# Patient Record
Sex: MALE | Race: WHITE | Employment: OTHER | URBAN - METROPOLITAN AREA
[De-identification: names, ages, dates, MRNs, and addresses within clinical notes are randomized per-mention and may not be internally consistent; named-entity substitution may affect disease eponyms.]

---

## 2017-06-13 PROBLEM — G95.19 INTERMITTENT SPINAL CLAUDICATION (HCC): Status: ACTIVE | Noted: 2017-06-13

## 2017-08-11 PROBLEM — G47.33 OBSTRUCTIVE SLEEP APNEA (ADULT) (PEDIATRIC): Status: ACTIVE | Noted: 2017-08-11

## 2017-11-17 ENCOUNTER — HOSPITAL ENCOUNTER (OUTPATIENT)
Dept: CT IMAGING | Age: 72
Discharge: HOME | End: 2017-11-17
Attending: SURGERY
Payer: MEDICARE

## 2017-11-17 VITALS — BODY MASS INDEX: 38.43 KG/M2 | HEIGHT: 73 IN | WEIGHT: 290 LBS

## 2017-11-17 DIAGNOSIS — I70.213 ATHEROSCLEROSIS OF NATIVE ARTERY OF BOTH LOWER EXTREMITIES WITH INTERMITTENT CLAUDICATION (HCC): ICD-10-CM

## 2017-11-17 LAB — CREAT BLD-MCNC: 1.2 MG/DL (ref 0.8–1.5)

## 2017-11-17 PROCEDURE — 75635 CT ANGIO ABDOMINAL ARTERIES: CPT

## 2017-11-17 PROCEDURE — 82565 ASSAY OF CREATININE: CPT

## 2017-11-17 PROCEDURE — 74011000258 HC RX REV CODE- 258: Performed by: SURGERY

## 2017-11-17 PROCEDURE — 74011636320 HC RX REV CODE- 636/320: Performed by: SURGERY

## 2017-11-17 RX ORDER — SODIUM CHLORIDE 0.9 % (FLUSH) 0.9 %
10 SYRINGE (ML) INJECTION
Status: COMPLETED | OUTPATIENT
Start: 2017-11-17 | End: 2017-11-17

## 2017-11-17 RX ADMIN — IOPAMIDOL 125 ML: 755 INJECTION, SOLUTION INTRAVENOUS at 10:00

## 2017-11-17 RX ADMIN — Medication 10 ML: at 10:00

## 2017-11-17 RX ADMIN — SODIUM CHLORIDE 100 ML: 900 INJECTION, SOLUTION INTRAVENOUS at 10:00

## 2017-12-14 PROBLEM — M25.561 CHRONIC PAIN OF RIGHT KNEE: Status: ACTIVE | Noted: 2017-12-14

## 2017-12-14 PROBLEM — G89.29 CHRONIC PAIN OF RIGHT KNEE: Status: ACTIVE | Noted: 2017-12-14

## 2018-08-20 PROBLEM — F17.211 CIGARETTE NICOTINE DEPENDENCE IN REMISSION: Status: ACTIVE | Noted: 2018-08-20

## 2018-11-27 PROBLEM — R93.1 ABNORMAL NUCLEAR CARDIAC IMAGING TEST: Status: ACTIVE | Noted: 2018-11-27

## 2018-12-03 ENCOUNTER — HOSPITAL ENCOUNTER (OUTPATIENT)
Dept: LAB | Age: 73
Discharge: HOME | End: 2018-12-03
Payer: MEDICARE

## 2018-12-03 DIAGNOSIS — R93.1 ABNORMAL NUCLEAR CARDIAC IMAGING TEST: ICD-10-CM

## 2018-12-03 LAB
ANION GAP SERPL CALC-SCNC: 9 MMOL/L (ref 7–16)
BASOPHILS # BLD: 0 K/UL (ref 0–0.2)
BASOPHILS NFR BLD: 0 % (ref 0–2)
BUN SERPL-MCNC: 16 MG/DL (ref 8–23)
CALCIUM SERPL-MCNC: 9 MG/DL (ref 8.3–10.4)
CHLORIDE SERPL-SCNC: 102 MMOL/L (ref 98–107)
CO2 SERPL-SCNC: 28 MMOL/L (ref 21–32)
CREAT SERPL-MCNC: 1.27 MG/DL (ref 0.8–1.5)
DIFFERENTIAL METHOD BLD: ABNORMAL
EOSINOPHIL # BLD: 0.1 K/UL (ref 0–0.8)
EOSINOPHIL NFR BLD: 1 % (ref 0.5–7.8)
ERYTHROCYTE [DISTWIDTH] IN BLOOD BY AUTOMATED COUNT: 14.2 % (ref 11.9–14.6)
GLUCOSE SERPL-MCNC: 325 MG/DL (ref 65–100)
HCT VFR BLD AUTO: 35.1 % (ref 41.1–50.3)
HGB BLD-MCNC: 11.6 G/DL (ref 13.6–17.2)
IMM GRANULOCYTES # BLD: 0 K/UL (ref 0–0.5)
IMM GRANULOCYTES NFR BLD AUTO: 1 % (ref 0–5)
INR PPP: 1.1
LYMPHOCYTES # BLD: 1.2 K/UL (ref 0.5–4.6)
LYMPHOCYTES NFR BLD: 18 % (ref 13–44)
MCH RBC QN AUTO: 33.1 PG (ref 26.1–32.9)
MCHC RBC AUTO-ENTMCNC: 33 G/DL (ref 31.4–35)
MCV RBC AUTO: 100.3 FL (ref 79.6–97.8)
MONOCYTES # BLD: 0.5 K/UL (ref 0.1–1.3)
MONOCYTES NFR BLD: 7 % (ref 4–12)
NEUTS SEG # BLD: 5 K/UL (ref 1.7–8.2)
NEUTS SEG NFR BLD: 73 % (ref 43–78)
NRBC # BLD: 0 K/UL (ref 0–0.2)
PLATELET # BLD AUTO: 187 K/UL (ref 150–450)
PMV BLD AUTO: 10.6 FL (ref 9.4–12.3)
POTASSIUM SERPL-SCNC: 4.8 MMOL/L (ref 3.5–5.1)
PROTHROMBIN TIME: 13.8 SEC (ref 11.5–14.5)
RBC # BLD AUTO: 3.5 M/UL (ref 4.23–5.6)
SODIUM SERPL-SCNC: 139 MMOL/L (ref 136–145)
WBC # BLD AUTO: 6.8 K/UL (ref 4.3–11.1)

## 2018-12-03 PROCEDURE — 85610 PROTHROMBIN TIME: CPT

## 2018-12-03 PROCEDURE — 85025 COMPLETE CBC W/AUTO DIFF WBC: CPT

## 2018-12-03 PROCEDURE — 36415 COLL VENOUS BLD VENIPUNCTURE: CPT

## 2018-12-03 PROCEDURE — 80048 BASIC METABOLIC PNL TOTAL CA: CPT

## 2018-12-04 RX ORDER — GLUCOSAMINE/CHONDR SU A SOD 750-600 MG
TABLET ORAL 2 TIMES DAILY
COMMUNITY

## 2018-12-04 NOTE — PROGRESS NOTES
Patient pre-assessment complete for [facility] poss with Dr Britt Eugene scheduled for 18 at 12:30pm, arrival time 10:30am. Patient verified using . Patient instructed to bring all home medications in labeled bottles on the day of procedure. NPO status reinforced. Patient informed to take a full dose aspirin 325mg  or 81 mg x 4 on the day of procedure. Patient instructed to HOLD metformin (last dose 12/3/18) & HOLD glipizide, januvia & exforge in am. Instructed they can take all other medications excluding vitamins & supplements. Patient verbalizes understanding of all instructions & denies any questions at this time.

## 2018-12-05 ENCOUNTER — HOSPITAL ENCOUNTER (OUTPATIENT)
Dept: CARDIAC CATH/INVASIVE PROCEDURES | Age: 73
Discharge: HOME | End: 2018-12-05
Attending: INTERNAL MEDICINE | Admitting: INTERNAL MEDICINE
Payer: MEDICARE

## 2018-12-05 VITALS
SYSTOLIC BLOOD PRESSURE: 144 MMHG | OXYGEN SATURATION: 94 % | HEART RATE: 77 BPM | RESPIRATION RATE: 16 BRPM | DIASTOLIC BLOOD PRESSURE: 75 MMHG

## 2018-12-05 PROBLEM — R07.9 CHEST PAIN: Status: ACTIVE | Noted: 2018-12-05

## 2018-12-05 LAB
ATRIAL RATE: 82 BPM
CALCULATED P AXIS, ECG09: -93 DEGREES
CALCULATED R AXIS, ECG10: 78 DEGREES
CALCULATED T AXIS, ECG11: 18 DEGREES
DIAGNOSIS, 93000: NORMAL
GLUCOSE BLD STRIP.AUTO-MCNC: 310 MG/DL (ref 65–100)
P-R INTERVAL, ECG05: 180 MS
Q-T INTERVAL, ECG07: 372 MS
QRS DURATION, ECG06: 98 MS
QTC CALCULATION (BEZET), ECG08: 434 MS
VENTRICULAR RATE, ECG03: 82 BPM

## 2018-12-05 PROCEDURE — 77030004534 HC CATH ANGI DX INFN CARD -A

## 2018-12-05 PROCEDURE — C1725 CATH, TRANSLUMIN NON-LASER: HCPCS

## 2018-12-05 PROCEDURE — 77030015766

## 2018-12-05 PROCEDURE — 74011250636 HC RX REV CODE- 250/636

## 2018-12-05 PROCEDURE — 74011636320 HC RX REV CODE- 636/320: Performed by: INTERNAL MEDICINE

## 2018-12-05 PROCEDURE — 77030019569 HC BND COMPR RAD TERU -B

## 2018-12-05 PROCEDURE — C1887 CATHETER, GUIDING: HCPCS

## 2018-12-05 PROCEDURE — C1769 GUIDE WIRE: HCPCS

## 2018-12-05 PROCEDURE — 93005 ELECTROCARDIOGRAM TRACING: CPT | Performed by: INTERNAL MEDICINE

## 2018-12-05 PROCEDURE — 93458 L HRT ARTERY/VENTRICLE ANGIO: CPT

## 2018-12-05 PROCEDURE — C1874 STENT, COATED/COV W/DEL SYS: HCPCS

## 2018-12-05 PROCEDURE — 74011000250 HC RX REV CODE- 250: Performed by: INTERNAL MEDICINE

## 2018-12-05 PROCEDURE — 74011250637 HC RX REV CODE- 250/637: Performed by: INTERNAL MEDICINE

## 2018-12-05 PROCEDURE — 99153 MOD SED SAME PHYS/QHP EA: CPT

## 2018-12-05 PROCEDURE — 82962 GLUCOSE BLOOD TEST: CPT

## 2018-12-05 PROCEDURE — 99152 MOD SED SAME PHYS/QHP 5/>YRS: CPT

## 2018-12-05 PROCEDURE — 77030012468 HC VLV BLEEDBK CNTRL ABBT -B

## 2018-12-05 PROCEDURE — 74011250636 HC RX REV CODE- 250/636: Performed by: INTERNAL MEDICINE

## 2018-12-05 PROCEDURE — 92928 PRQ TCAT PLMT NTRAC ST 1 LES: CPT

## 2018-12-05 PROCEDURE — C1894 INTRO/SHEATH, NON-LASER: HCPCS

## 2018-12-05 PROCEDURE — 74011000258 HC RX REV CODE- 258: Performed by: INTERNAL MEDICINE

## 2018-12-05 RX ORDER — MIDAZOLAM HYDROCHLORIDE 1 MG/ML
.5-2 INJECTION, SOLUTION INTRAMUSCULAR; INTRAVENOUS
Status: DISCONTINUED | OUTPATIENT
Start: 2018-12-05 | End: 2018-12-05 | Stop reason: HOSPADM

## 2018-12-05 RX ORDER — ROSUVASTATIN CALCIUM 5 MG/1
10 TABLET, COATED ORAL DAILY
Status: DISCONTINUED | OUTPATIENT
Start: 2018-12-06 | End: 2018-12-05 | Stop reason: HOSPADM

## 2018-12-05 RX ORDER — CLOPIDOGREL BISULFATE 75 MG/1
300 TABLET ORAL ONCE
Status: COMPLETED | OUTPATIENT
Start: 2018-12-05 | End: 2018-12-05

## 2018-12-05 RX ORDER — GUAIFENESIN 100 MG/5ML
324 LIQUID (ML) ORAL ONCE
Status: DISCONTINUED | OUTPATIENT
Start: 2018-12-05 | End: 2018-12-05 | Stop reason: HOSPADM

## 2018-12-05 RX ORDER — MAG HYDROX/ALUMINUM HYD/SIMETH 200-200-20
30 SUSPENSION, ORAL (FINAL DOSE FORM) ORAL ONCE
Status: COMPLETED | OUTPATIENT
Start: 2018-12-05 | End: 2018-12-05

## 2018-12-05 RX ORDER — AMLODIPINE, VALSARTAN AND HYDROCHLOROTHIAZIDE 10; 320; 25 MG/1; MG/1; MG/1
1 TABLET ORAL DAILY
Status: DISCONTINUED | OUTPATIENT
Start: 2018-12-06 | End: 2018-12-05 | Stop reason: HOSPADM

## 2018-12-05 RX ORDER — LIDOCAINE HYDROCHLORIDE 10 MG/ML
3-10 INJECTION INFILTRATION; PERINEURAL
Status: DISCONTINUED | OUTPATIENT
Start: 2018-12-05 | End: 2018-12-05 | Stop reason: HOSPADM

## 2018-12-05 RX ORDER — CLOPIDOGREL BISULFATE 75 MG/1
75 TABLET ORAL DAILY
Status: DISCONTINUED | OUTPATIENT
Start: 2018-12-06 | End: 2018-12-05 | Stop reason: HOSPADM

## 2018-12-05 RX ORDER — CLOPIDOGREL BISULFATE 75 MG/1
75 TABLET ORAL ONCE
Status: COMPLETED | OUTPATIENT
Start: 2018-12-05 | End: 2018-12-05

## 2018-12-05 RX ORDER — SODIUM CHLORIDE 9 MG/ML
75 INJECTION, SOLUTION INTRAVENOUS CONTINUOUS
Status: DISCONTINUED | OUTPATIENT
Start: 2018-12-05 | End: 2018-12-05 | Stop reason: HOSPADM

## 2018-12-05 RX ORDER — HEPARIN SODIUM 200 [USP'U]/100ML
2 INJECTION, SOLUTION INTRAVENOUS CONTINUOUS
Status: DISCONTINUED | OUTPATIENT
Start: 2018-12-05 | End: 2018-12-05 | Stop reason: HOSPADM

## 2018-12-05 RX ADMIN — IOPAMIDOL 150 ML: 755 INJECTION, SOLUTION INTRAVENOUS at 13:16

## 2018-12-05 RX ADMIN — BIVALIRUDIN 1.75 MG/KG/HR: 250 INJECTION, POWDER, LYOPHILIZED, FOR SOLUTION INTRAVENOUS at 12:58

## 2018-12-05 RX ADMIN — CLOPIDOGREL BISULFATE 75 MG: 75 TABLET ORAL at 09:51

## 2018-12-05 RX ADMIN — ALUMINUM HYDROXIDE, MAGNESIUM HYDROXIDE, AND SIMETHICONE 30 ML: 200; 200; 20 SUSPENSION ORAL at 13:20

## 2018-12-05 RX ADMIN — LIDOCAINE HYDROCHLORIDE 4 ML: 10 INJECTION, SOLUTION INFILTRATION; PERINEURAL at 12:46

## 2018-12-05 RX ADMIN — HEPARIN SODIUM 2 UNITS/HR: 5000 INJECTION, SOLUTION INTRAVENOUS; SUBCUTANEOUS at 12:31

## 2018-12-05 RX ADMIN — SODIUM CHLORIDE 75 ML/HR: 900 INJECTION, SOLUTION INTRAVENOUS at 09:51

## 2018-12-05 RX ADMIN — CLOPIDOGREL BISULFATE 300 MG: 75 TABLET, FILM COATED ORAL at 13:20

## 2018-12-05 RX ADMIN — HEPARIN SODIUM 2 ML: 10000 INJECTION INTRAVENOUS; SUBCUTANEOUS at 12:47

## 2018-12-05 RX ADMIN — MIDAZOLAM HYDROCHLORIDE 2 MG: 1 INJECTION, SOLUTION INTRAMUSCULAR; INTRAVENOUS at 12:41

## 2018-12-05 NOTE — PROGRESS NOTES
Patient received to [facility] room # 9  Ambulatory from [facility]. Patient scheduled for [facility] today with Dr Koby Chapin. Procedure reviewed & questions answered, voiced good understanding consent obtained & placed on chart. All medications and medical history reviewed. Will prep patient per orders. Patient & family updated on plan of care. The patient has a fraility score of 3-MANAGING WELL, based on ability to perform ADLs by self.

## 2018-12-05 NOTE — PROGRESS NOTES
TRANSFER - OUT REPORT: 
 
[facility] Dr Eladia Dalton RRA Stents x 1 OM (hamzah) Versed 2 mg Angiomax start 1257, stop 1320 Plavix 300 mg 
Mylanta 30 ml 
TR band 12 ml No bleeding/hematoma VSS Pt is a/o no complaints Verbal report given to Sindy(name) on [facility] Board  being transferred to CPRU(unit) for routine progression of care Report consisted of patients Situation, Background, Assessment and  
Recommendations(SBAR). Information from the following report(s) SBAR and Procedure Summary was reviewed with the receiving nurse. Lines:  
Peripheral IV 12/05/18 Anterior;Right Antecubital (Active) Peripheral IV 12/05/18 Anterior; Left Antecubital (Active) Opportunity for questions and clarification was provided.

## 2018-12-05 NOTE — DISCHARGE SUMMARY
[facility] Cardiology Discharge Summary     Patient ID:  Mike Vargas  [id number]  75 y.o.  1945    Admit date: 12/5/2018    Discharge date and time:  12-5-2018    Admitting Physician: Belia Fields MD     Discharge Physician: Jerry Hernández PA-C/Dr. Derian Pemberton    Admission Diagnoses: CAD (coronary artery disease) [I25.10]  Abnormal nuclear stress test [R94.39]    Discharge Diagnoses:   Patient Active Problem List    Diagnosis Date Noted    Chest pain 12/05/2018    Abnormal nuclear cardiac imaging test 11/27/2018    Cigarette nicotine dependence in remission 08/20/2018    Chronic pain of right knee 12/14/2017    Obstructive sleep apnea 08/11/2017    Intermittent spinal claudication ([address] 06/13/2017    Pulmonary emphysema ([address] 11/17/2016    Asthma; SEASONAL 11/10/2015    Encounter for long-term (current) drug use 11/10/2015    Vertiginous syndromes & LABYRINTHINE DISORDERS/UNSPEC 11/10/2015    Orthostatic hypotension 11/10/2015    Allergic rhinitis, CAUSE UNSPECIFIED 98/20/7134    Complicated wound infection 11/10/2015    Osteoarthritis 11/10/2015    Hiatal hernia 11/10/2015    Benign neoplasm of colon 11/10/2015    PAD (peripheral artery disease) ([address] 11/10/2015    H/O endarterectomy; 9/2014 11/10/2015    Diabetic neuropathy  11/10/2015    Hyperlipidemia 11/10/2015    S/P angioplasty with stent, (1997) 11/10/2015    COPD (chronic obstructive pulmonary disease) ([address] 04/10/2015    Arterial degeneration 11/05/2014    Common femoral artery injury 11/05/2014    Normocytic anemia 10/23/2014    DM (diabetes mellitus) type II, controlled, with peripheral vascular disorder ([address] 09/11/2014    Personal history of tobacco use, presenting hazards to health 02/12/2013    Asbestos exposure 02/12/2013    Ischemic cardiomyopathy 10/12/2011    Coronary atherosclerosis of native coronary artery 10/12/2011    HTN (hypertension) 10/12/2011    Sleep apnea 10/12/2011    Morbid obesity ([address] 10/12/2011       Cardiology Procedures this admission:  Left heart catheterization with PCI  Consults: None    Hospital Course: Pt was seen in the office with c/o SOB, SERRANO and intermittent CP and had an abnormal nuclear stress test. Pt was scheduled for an AM Admission C at [facility] on 12-5-18. Pt came in to [facility] and was taken to the cath lab by Dr. Lea Stewart. Pt was found to have EF 40% by LV gram, a 95% OM1 stenosis that was stented with a 2.5 x 12 mm Yoel MIRELA with 0% residual stenosis. Pt tolerated the procedure well and was taken to the prep and recovery area for recovery. Pt had TR band successfully removed and was up feeling well without any complaints of CP, SOB or palpitations. Pt's right radial cath site was clean, dry and intact without hematoma or bruit. Pt was seen and examined by Dr. Lea Stewart and determined stable and ready for same day discharge. Pt was instructed on the importance of taking aspirin and plavix everyday without missing a dose. Pt will need to take dual antiplatelet therapy for at least one year. Due to CAD, Pt will also remain on BB, ARB and statin. Pt may resume home dose Metformin on Friday. DISPOSITION: The patient is being discharged home in stable condition on a low saturated fat, low cholesterol and low salt diet. Pt is instructed to advance activities as tolerated limited to fatigue or shortness of breath. Pt is instructed to do no heavy lifting, straining, stooping or squatting for 5 days. Pt is instructed to watch cath site for bleeding/oozing, if seen Pt is instructed to apply firm pressure with clean cloth and call office at [phone]. Pt is instructed to watch for signs of infection which include increasing area of redness around site, fever/hot to touch or purulent drainage. Pt is instructed not to soak in a tub bath for 1 week, but it is okay to shower.  Pt is instructed to call office or return to ER for immediate evaluation of any shortness of breath or chest pain not relieved by NTG. Follow up with [facility] Cardiology Dr. Siddharth Redman in 2-4 weeks  Pt is being referred to out patient cardiac rehab. Discharge Exam:   Visit Vitals  /67   Pulse 90   Resp 29   Ht (P) 6' 1\" (1.854 m)   Wt (P) 136.1 kg (300 lb)   SpO2 94%   BMI (P) 39.58 kg/m²   Pt has been seen by Dr. Parada Grain: see his progress note for exam details. Recent Results (from the past 24 hour(s))   GLUCOSE, POC    Collection Time: 12/05/18 10:01 AM   Result Value Ref Range    Glucose (POC) 310 (H) 65 - 100 mg/dL   EKG, 12 LEAD, INITIAL    Collection Time: 12/05/18 10:01 AM   Result Value Ref Range    Ventricular Rate 82 BPM    Atrial Rate 82 BPM    P-R Interval 180 ms    QRS Duration 98 ms    Q-T Interval 372 ms    QTC Calculation (Bezet) 434 ms    Calculated P Axis -93 degrees    Calculated R Axis 78 degrees    Calculated T Axis 18 degrees    Diagnosis       Unusual P axis, possible ectopic atrial rhythm with occasional Premature   ventricular complexes  Low voltage QRS  Abnormal ECG  When compared with ECG of 06-NOV-2014 16:39,  Ectopic atrial rhythm has replaced Sinus rhythm  Confirmed by [facility]ILBER [facility] ODAL[facility]  MD (), Darwin Sanchez ([id number]) on 12/5/2018 11:23:59 AM           Patient Instructions:   Current Discharge Medication List      CONTINUE these medications which have NOT CHANGED    Details   glucosamine/chondr funez A sod (GLUCOSAMINE-CHONDROITIN) 750-600 mg tab Take  by mouth two (2) times a day. cholecalciferol, vitamin D3, (VITAMIN D3) 2,000 unit tab Take 2,000 Units by mouth nightly. montelukast (SINGULAIR) 10 mg tablet 1 po q hs  Qty: 90 Tab, Refills: 3      ADVAIR DISKUS 250-50 mcg/dose diskus inhaler INHALE 1 PUFF 2 TIMES A DAY, RINSE MOUTH AFTER USE  Qty: 1 Inhaler, Refills: 11      fluticasone (FLONASE) 50 mcg/actuation nasal spray USE 1 OR 2 SPRAYS IN EACH NOSTRIL EVERY DAY  Qty: 48 g, Refills: 3      cilostazol (PLETAL) 50 mg tablet TAKE 1 TABLET BY MOUTH TWICE DAILY.   Qty: 180 Tab, Refills: 3 amLODIPine-Valsartan-HCTZ -25 mg tab TAKE 1/2 TO 1 TABLET BY MOUTH EVERY DAY  Qty: 90 Tab, Refills: 3      JANUVIA 100 mg tablet TAKE ONE TABLET BY MOUTH EVERY DAY  Qty: 90 Tab, Refills: 3      rosuvastatin (CRESTOR) 10 mg tablet TAKE ONE TABLET BY MOUTH ONCE A DAY  Qty: 90 Tab, Refills: 3      glipiZIDE SR (GLUCOTROL XL) 10 mg CR tablet TAKE ONE TABLET BY MOUTH 2 TIMES A DAY  Qty: 180 Tab, Refills: 3      metFORMIN (GLUCOPHAGE) 500 mg tablet TAKE TWO TABLETS BY MOUTH 2 TIMES A DAY  Qty: 360 Tab, Refills: 3      clopidogrel (PLAVIX) 75 mg tab Take 1 Tab by mouth daily. TAKE 1 TABLET EVERY DAY  Qty: 90 Tab, Refills: 4      trimethoprim-sulfamethoxazole (BACTRIM DS, SEPTRA DS) 160-800 mg per tablet Take 1 Tab by mouth daily. FISH OIL CONCENTRATE 1,000 mg cap Take 1-2 Caps by mouth two (2) times a day. One tab in am & 2 tabs in pm      garlic 9,130 mg cap Take 1 Tab by mouth two (2) times a day. MULTIVITS-MINERALS/FA/LYCOPENE (ONE-A-DAY MEN'S MULTIVITAMIN PO) Take 1 Tab by mouth daily. cpap machine kit Take  by inhalation. qhs      aspirin 81 mg tablet Take 81 mg by mouth nightly. diclofenac (VOLTAREN) 1 % gel Apply 4 g to affected area four (4) times daily as needed. Qty: 5 Each, Refills: 1    Associated Diagnoses: Knee pain, unspecified chronicity, unspecified laterality      albuterol (PROAIR HFA) 90 mcg/actuation inhaler USE 2 PUFFS FOUR TIMES DAILY AS NEEDED. Qty: 3 Inhaler, Refills: 3      meclizine (ANTIVERT) 25 mg tablet Take 25 mg by mouth three (3) times daily as needed. nitroglycerin (NITROSTAT) 0.4 mg SL tablet 0.4 mg by SubLINGual route every five (5) minutes as needed for Chest Pain. cetirizine (ZYRTEC) 10 mg tablet Take  by mouth as needed for Allergies.            Signed:  Sandhya Muñoz PA-C/Dr. Courtney Osorio  12/5/2018  3:11 PM

## 2018-12-05 NOTE — PROGRESS NOTES
Report received from [facility] Lab RN. Procedural findings communicated. Intra procedural  medication administration reviewed. Progression of care discussed. Patient received into [id number] [facility] 4 post sheath removal.  
 
Access site without bleeding or swelling yes Dressing dry and intact yes Patient instructed to limit movement to right upper  extremity Routine post procedural vital signs and site assessment initiated yes

## 2018-12-05 NOTE — DISCHARGE INSTRUCTIONS
May resume metformin on Friday morning, hold until then due to receiving contrast dye. Percutaneous Coronary Intervention: What to Expect at [facility]  Percutaneous coronary intervention (PCI) is the name for procedures that are used to open a blocked coronary artery. The two most common PCI procedures are coronary angioplasty and coronary stent placement. Your groin or arm may have a bruise and feel sore for a day or two after a percutaneous coronary intervention (PCI). You can do light activities around the house, but nothing strenuous for several days. This care sheet gives you a general idea about how long it will take for you to recover. But each person recovers at a different pace. Follow the steps below to get better as quickly as possible. How can you care for yourself at home? Activity  · Do not do strenuous exercise and do not lift anything heavy until your doctor says it is okay. You can walk around the house and do light activity, such as cooking. · For 7-10 days after you go home, do not take baths. Showers are okay. · Try not to walk up stairs for the first couple of days (if the catheter was placed in the artery in your groin). · Go back to regular exercise after seen by cardiologist. Exercise is good for your heart. Get at least 30 minutes of physical activity on most days of the week. Walking is a good choice. If your doctor says it is okay, you also may want to do other activities, such as running, swimming, cycling, or playing tennis. Diet  · Drink plenty of fluids to help your body flush out the dye. If you have kidney, heart, or liver disease and have to limit fluids, talk with your doctor before you increase the amount of fluids you drink. · Keep eating a heart-healthy, low-fat diet that has lots of fruits, vegetables, and whole grains. If you have not been eating this way, talk to your doctor. You also may want to talk to a dietitian.  This expert can help you to learn about healthy foods and plan meals. Medicines  · Your doctor may have prescribed a blood-thinning medicine like aspirin and/or Plavix. It is very important that you take these medicines daily exactly as directed in order to keep the coronary artery open and reduce your risk of a heart attack. · Call your doctor if you think you are having a problem with your medicine. Care of the catheter site  · For 1 or 2 days, you may keep a bandage over the spot where the catheter was inserted if needed. Most often, the bandage may be removed at discharge. · Put ice or a cold pack on the area for 10 to 15 minutes at a time to help with soreness or swelling. Put a thin cloth between the ice and your skin. Follow-up care is a key part of your treatment and safety. Be sure to make and go to all appointments, and call your doctor if you are having problems. Its also a good idea to know your test results. Keep an updated list of your medicines to show all medical providers. When should you call for help? Call 911 anytime you think you may need emergency care. For example, call if:  · You pass out (lose consciousness). · You have severe trouble breathing. · You have sudden chest pain and shortness of breath, or you cough up blood. · You have chest pain or pressure. This may occur with:  ¨ Sweating. ¨ Shortness of breath. ¨ Nausea or vomiting. ¨ Pain that spreads from the chest to the neck, jaw, or one or both shoulders or arms. ¨ Dizziness or lightheadedness. ¨ A fast or uneven pulse. After calling 911, chew 1 adult aspirin. Wait for an ambulance. Do not try to drive yourself. · You have been diagnosed with angina, and you have chest pain that does not go away with rest or is not getting better within 5 minutes after you take a dose of nitroglycerin. Call your doctor now or seek immediate medical care if:  · You are bleeding from the area where the catheter was put in your artery.   · You have signs of infection, such as:  ¨ Increased pain, swelling, warmth, or redness. ¨ Red streaks leading from the catheter site. ¨ Pus draining from the catheter site. ¨ Swollen lymph nodes in your neck, armpits, or groin. ¨ A fever. · Your leg or arm looks blue or feels cold, numb, or tingly. Watch closely for changes in your health, and be sure to contact your doctor if you have any problems. Where can you learn more? Go to [employer].be  Enter E261 in the search box to learn more about \"Percutaneous Coronary Intervention: What to Expect at Home\". This care instruction is for use with your licensed healthcare professional. If you have questions about a medical condition or this instruction, always ask your healthcare professional. Care instructions adapted by Natasha Solares (which disclaims liability or warranty for this information) from Katheryn Lund, [address] [place] [address] 2008. [employer] disclaims any warranty or liability for your use of this information. HEART CATHETERIZATION/ANGIOGRAPHY DISCHARGE INSTRUCTIONS    POST PCI/STENT DISCHARGE INSTRUCTIONS    1. Check puncture site frequently for swelling or bleeding. If there is any bleeding, lie down and apply pressure over the area with a clean towel or washcloth and call 911. Notify your doctor for any redness, swelling, drainage, or oozing from the puncture site. Notify your doctor for any fever or chills. 2. If the extremity becomes cold, numb, or painful call [facility] at [phone].    3. Activity should be limited for the next 48-72 hours. No heavy lifting (anything over 10 pounds) for 3 days. No pushing or pulling with your right arm for the next three days. 4.  You may resume your usual diet. Drink more fluids than usual.    5.  Have a responsible person drive you home and stay with you for at least 24 hours after your heart catheterization/angiography. No driving for 24 hours.     6. You may remove bandage from your right wrist in 24 hours. You may shower in 24 hours. No tub baths, hot tubs, or swimming for 1 week. Do not place any lotions, creams, powders, or ointments over puncture site for 1 week. You may place a clean band-aid over the puncture site each day for 5 days. Change daily. YOU ARE BEING DISCHARGED ON TWO ANTI-PLATELET MEDICATIONS    1- Plavix 75 mg daily    2- Asprin 81 mg daily    THESE MEDICATIONS  ARE VERY IMPORTANT TO YOUR RECOVERY AND  MUST BE TAKEN EXACTLY AS PRESCRIBED & NOT STOPPED FOR ANY REASON. THESE MAY ONLY BE STOPPED WITH AN ORDER FROM YOUR CARDIOLOGIST. PLEASE HAVE THESE FILLED IMMEDIATELY AND START TAKING Tomorrow    YOU ARE ALSO BEING DISCHARGED ON A MEDICATION FOR CHOLESTEROL MANAGEMENT. 1- Popeor      You have also been referred to [facility]. Someone from Cardiac Rehab will be calling you to set up a follow up appointment. If they have not called you within a week,  please call [phone]. Percutaneous Coronary Intervention: What to Expect at [facility]     Percutaneous coronary intervention (PCI) is the name for procedures that are used to open a narrowed or blocked coronary artery. The two most common PCI procedures are coronary angioplasty and coronary stent placement. Your groin or arm may have a bruise and feel sore for a day or two after a percutaneous coronary intervention (PCI). You can do light activities around the house, but nothing strenuous for several days. This care sheet gives you a general idea about how long it will take for you to recover. But each person recovers at a different pace. Follow the steps below to get better as quickly as possible. How can you care for yourself at home? Activity  · Do not do strenuous exercise and do not lift, pull, or push anything heavy until your doctor says it is okay. This may be for a day or two. You can walk around the house and do light activity, such as cooking.   · You may shower 24 to 48 hours after the procedure, if your doctor okays it. Pat the incision dry. Do not take a bath for 1 week, or until your doctor tells you it is okay. · If the catheter was placed in your groin, try not to walk up stairs for the first couple of days. · If the catheter was placed in your arm near your wrist, do not bend your wrist deeply for the first couple of days. Be careful using your hand to get into and out of a chair or bed. · If your doctor recommends it, get more exercise. Walking is a good choice. Bit by bit, increase the amount you walk every day. Try for at least 30 minutes on most days of the week. Diet  · Drink plenty of fluids to help your body flush out the dye. If you have kidney, heart, or liver disease and have to limit fluids, talk with your doctor before you increase the amount of fluids you drink. · Keep eating a heart-healthy diet that has lots of fruits, vegetables, and whole grains. If you have not been eating this way, talk to your doctor. You also may want to talk to a dietitian. This expert can help you to learn about healthy foods and plan meals. Medicines  · Your doctor will tell you if and when you can restart your medicines. He or she will also give you instructions about taking any new medicines. · If you take blood thinners, such as warfarin (Coumadin), clopidogrel (Plavix), or aspirin, be sure to talk to your doctor. He or she will tell you if and when to start taking those medicines again. Make sure that you understand exactly what your doctor wants you to do. · Your doctor will prescribe blood-thinning medicines. You will likely take aspirin plus another antiplatelet, such as clopidogrel (Plavix). It is very important that you take these medicines exactly as directed. These medicines help keep the coronary artery open and reduce your risk of a heart attack. · Call your doctor if you think you are having a problem with your medicine.   Care of the catheter site  · For 1 or 2 days, keep a bandage over the spot where the catheter was inserted. The bandage probably will fall off in this time. · Put ice or a cold pack on the area for 10 to 20 minutes at a time to help with soreness or swelling. Put a thin cloth between the ice and your skin. Follow-up care is a key part of your treatment and safety. Be sure to make and go to all appointments, and call your doctor if you are having problems. It's also a good idea to know your test results and keep a list of the medicines you take. When should you call for help? Call 911 anytime you think you may need emergency care. For example, call if:  · You passed out (lost consciousness). · You have severe trouble breathing. · You have sudden chest pain and shortness of breath, or you cough up blood. · You have symptoms of a heart attack, such as:  ¨ Chest pain or pressure. ¨ Sweating. ¨ Shortness of breath. ¨ Nausea or vomiting. ¨ Pain that spreads from the chest to the neck, jaw, or one or both shoulders or arms. ¨ Dizziness or lightheadedness. ¨ A fast or uneven pulse. After calling 911, chew 1 adult-strength aspirin. Wait for an ambulance. Do not try to drive yourself. · You have been diagnosed with angina, and you have angina symptoms that do not go away with rest or are not getting better within 5 minutes after you take one dose of nitroglycerin. Call your doctor now or seek immediate medical care if:  · You are bleeding from the area where the catheter was put in your artery. · You have a fast-growing, painful lump at the catheter site. · You have signs of infection, such as:  ¨ Increased pain, swelling, warmth, or redness. ¨ Red streaks leading from the catheter site. ¨ Pus draining from the catheter site. ¨ A fever. · Your leg or arm looks blue or feels cold, numb, or tingly. Watch closely for changes in your health, and be sure to contact your doctor if you have any problems. Where can you learn more?    Go to [employer].be  Enter Q257 in the search box to learn more about \"Percutaneous Coronary Intervention: What to Expect at Home. \"   © 3929-5484 Healthwise, Incorporated. Care instructions adapted under license by [employer] (which disclaims liability or warranty for this information). This care instruction is for use with your licensed healthcare professional. If you have questions about a medical condition or this instruction, always ask your healthcare professional. [place] [address] any warranty or liability for your use of this information. Content Version: 97.9.613711; Current as of: May 22, 2015         Cardiac Rehabilitation: Care Instructions  Your Care Instructions    Cardiac rehabilitation is a program for people who have a heart problem, such as a heart attack, heart failure, or a heart valve disease. The program includes exercise, lifestyle changes, education, and emotional support. Cardiac rehab can help you improve the quality of your life through better overall health. It can help you lose weight and feel better about yourself. On your cardiac rehab team, you may have your doctor, a nurse specialist, a dietitian, and a physical therapist. They will design your cardiac rehab program specifically for you. You will learn how to reduce your risk for heart problems, how to manage stress, and how to eat a heart-healthy diet. By the end of the program, you will be ready to maintain a healthier lifestyle on your own. Follow-up care is a key part of your treatment and safety. Be sure to make and go to all appointments, and call your doctor if you are having problems. It's also a good idea to know your test results and keep a list of the medicines you take. How can you care for yourself at home? · Take your medicines exactly as prescribed. Call your doctor if you think you are having a problem with your medicine.  You will get more details on the specific medicines your doctor prescribes. · Weigh yourself every day if your doctor tells you to. Watch for sudden weight gain. Weigh yourself on the same scale with the same amount of clothing at the same time of day. · Plan your meals so that you are eating heart-healthy foods. ¨ Eat a variety of foods daily. Fresh fruits and vegetables and whole-grains are good choices. ¨ Limit your fat intake, especially saturated and trans fat. ¨ Limit salt (sodium). ¨ Increase fiber in your diet. ¨ Limit alcohol. · Learn how to take your pulse so that you can track your heart rate during exercise. · Always check with your doctor before you begin a new exercise program.  · Warm up before you exercise and cool down afterward for at least 15 minutes each. This will help your heart gradually prepare for and recover from exercise and avoid pushing your heart too hard. · Stop exercising if you have any unusual discomfort, such as chest pain. · Do not smoke. Smoking can make heart problems worse. If you need help quitting, talk to your doctor about stop-smoking programs and medicines. These can increase your chances of quitting for good. When should you call for help? Call 911 anytime you think you may need emergency care. For example, call if:  · You have severe trouble breathing. · You cough up pink, foamy mucus and you have trouble breathing. · You have symptoms of a heart attack. These may include:  ¨ Chest pain or pressure, or a strange feeling in the chest.  ¨ Sweating. ¨ Shortness of breath. ¨ Nausea or vomiting. ¨ Pain, pressure, or a strange feeling in the back, neck, jaw, or upper belly or in one or both shoulders or arms. ¨ Lightheadedness or sudden weakness. ¨ A fast or irregular heartbeat. After you call 911, the  may tell you to chew 1 adult-strength or 2 to 4 low-dose aspirin. Wait for an ambulance. Do not try to drive yourself.   · You have angina symptoms (such as chest pain or pressure) that do not go away with rest or are not getting better within 5 minutes after you take a dose of nitroglycerin. · You have symptoms of a stroke. These may include:  ¨ Sudden numbness, tingling, weakness, or loss of movement in your face, arm, or leg, especially on only one side of your body. ¨ Sudden vision changes. ¨ Sudden trouble speaking. ¨ Sudden confusion or trouble understanding simple statements. ¨ Sudden problems with walking or balance. ¨ A sudden, severe headache that is different from past headaches. · You passed out (lost consciousness). Call your doctor now or seek immediate medical care if:  · You have new or increased shortness of breath. · You are dizzy or lightheaded, or you feel like you may faint. · You gain weight suddenly, such as 3 pounds or more in 2 to 3 days. (Your doctor may suggest a different range of weight gain.)  · You have increased swelling in your legs, ankles, or feet. Watch closely for changes in your health, and be sure to contact your doctor if you have any problems. Where can you learn more? Go to http://mandeep-ira.info/. Enter M788 in the search box to learn more about \"Cardiac Rehabilitation: Care Instructions. \"  Current as of: May 5, 2016  Content Version: 11.1  © 9681-8027 [email or web address]. Care instructions adapted under license by [employer] (which disclaims liability or warranty for this information). If you have questions about a medical condition or this instruction, always ask your healthcare professional. [place] [address] any warranty or liability for your use of this information. Healthcare Interactive Activation    Thank you for requesting access to Healthcare Interactive. Please follow the instructions below to securely access and download your online medical record. Healthcare Interactive allows you to send messages to your doctor, view your test results, renew your prescriptions, schedule appointments, and more. How Do I Sign Up? 1.  In your internet browser, go to [email or web address]. 2. Click on the First Time User? Click Here link in the Sign In box. You will see the New Member Sign Up page. 3. Enter your Avvenu Access Code exactly as it appears below. You will not need to use this code after youve completed the sign-up process. If you do not sign up before the expiration date, you must request a new code. Avvenu Access Code: [email or web address]  Expires: 2019  2:48 PM (This is the date your Avvenu access code will )    4. Enter the last four digits of your Social Security Number (xxxx) and Date of Birth (mm/dd/yyyy) as indicated and click Submit. You will be taken to the next sign-up page. 5. Create a Avvenu ID. This will be your Avvenu login ID and cannot be changed, so think of one that is secure and easy to remember. 6. Create a Avvenu password. You can change your password at any time. 7. Enter your Password Reset Question and Answer. This can be used at a later time if you forget your password. 8. Enter your e-mail address. You will receive e-mail notification when new information is available in [address] 9. Click Sign Up. You can now view and download portions of your medical record. 10. Click the Download Summary menu link to download a portable copy of your medical information. Additional Information    If you have questions, please visit the Frequently Asked Questions section of the Avvenu website at [email or web address] Remember, Avvenu is NOT to be used for urgent needs. For medical emergencies, dial 911.

## 2018-12-05 NOTE — PROGRESS NOTES
Discharge Same Day as PCI Teaching Discharge teaching completed. Patient instructed on right wrist site care, including symptoms to report to MD, medication changes & Follow up Care to include: 1- Patient is being treated with 2 separate anti-platelet medications including aspirin 81 mg & Plavix 75 mg. It is very important that these medications are filled today started tomorrow. Patient instructed on the importance of these medications & that these medications must not be stopped for any reason or without talking to the doctor first. 
2-  Patient is also being discharged on a medication for cholesterol management (ie-statin) Crestor and instructed on the importance of continuing this medication as well. Patient received a referral for [facility], contact information was faxed to Cardiac rehab & patient given telephone number to contact ([phone]) Cardiac Rehab if they have not heard from them within 10 days.

## 2018-12-05 NOTE — PROCEDURES
Cardiac Catheterization Procedure Note    Patient ID:     Name: Dexter Michelle   Medical Record Number: [id number]   YOB: 1945    Date of Procedure: 12/5/2018     Pre-procedure Diagnosis:  Shortness of Breath    Post-procedure Diagnosis: Coronary Artery Disease    Reason for Procedure: Suspected CAD    Blood loss less than 5 ml    Sedation. Pt received 2 mg versed and 0 mcg fentanyl for monitored conscious sedation from 12:45 to 1:20. Nurse funk    Specimen: None    No complications    No assistants    Time out, Mallampati, and ASA performed    Procedure:  After informed consent, patient was prepped and draped in the usual sterile fashion. The right wrist was infiltrated with lidocaine. The right radial artery was accessed via the modified Seldinger technique with a 6 Guyanese sheath. 150cc Visipaque contrast were utilized for the entire procedure. no closure device used    Catheters/wires/stents.  tig4 JL3.5 guide pigtail 2.5x12 hamzah stent 2.5x15 balloon    FINDINGS    Left Ventricle: 40%  LVEDP: 20    Left Main:short large and normal    Left Anterior descending coronary artery: moderate prox and mid lad 30-40% plaque    diagonals 2 diagonals with mild disease    Left Circumflex coronary artery: circ proper ok OM1 has 95% stenosis   OMs om 1 95% ISR   Ramus not present    Right coronary artery: non dominant and 100% occluded at rv branch   RV branch pastent   WINTER/PDA fill from collaterals from the AV circ      Graft anatomy: na    Intervention if done: stent to OM1 with 2.5x12 hamzah gracie to 24 SKY    Conclusions: one vessel stenting    Recommentations: dapt    No complications      Signed By: Dion Joel MD

## 2018-12-06 LAB
ATRIAL RATE: 81 BPM
CALCULATED P AXIS, ECG09: 66 DEGREES
CALCULATED R AXIS, ECG10: 82 DEGREES
CALCULATED T AXIS, ECG11: 9 DEGREES
DIAGNOSIS, 93000: NORMAL
P-R INTERVAL, ECG05: 190 MS
Q-T INTERVAL, ECG07: 370 MS
QRS DURATION, ECG06: 102 MS
QTC CALCULATION (BEZET), ECG08: 429 MS
VENTRICULAR RATE, ECG03: 81 BPM

## 2019-02-08 ENCOUNTER — HOSPITAL ENCOUNTER (INPATIENT)
Age: 74
LOS: 3 days | Discharge: HOME | DRG: 871 | End: 2019-02-11
Attending: EMERGENCY MEDICINE | Admitting: FAMILY MEDICINE
Payer: MEDICARE

## 2019-02-08 ENCOUNTER — APPOINTMENT (OUTPATIENT)
Dept: GENERAL RADIOLOGY | Age: 74
DRG: 871 | End: 2019-02-08
Attending: EMERGENCY MEDICINE
Payer: MEDICARE

## 2019-02-08 DIAGNOSIS — J18.9 PNEUMONIA DUE TO INFECTIOUS ORGANISM, UNSPECIFIED LATERALITY, UNSPECIFIED PART OF LUNG: ICD-10-CM

## 2019-02-08 DIAGNOSIS — R65.20 SEVERE SEPSIS (HCC): Primary | ICD-10-CM

## 2019-02-08 DIAGNOSIS — A41.9 SEVERE SEPSIS (HCC): Primary | ICD-10-CM

## 2019-02-08 PROBLEM — R09.02 HYPOXIA: Status: ACTIVE | Noted: 2019-02-08

## 2019-02-08 LAB
ALBUMIN SERPL-MCNC: 3.7 G/DL (ref 3.2–4.6)
ALBUMIN/GLOB SERPL: 1.2 {RATIO} (ref 1.2–3.5)
ALP SERPL-CCNC: 55 U/L (ref 50–136)
ALT SERPL-CCNC: 34 U/L (ref 12–65)
ANION GAP SERPL CALC-SCNC: 14 MMOL/L (ref 7–16)
AST SERPL-CCNC: 14 U/L (ref 15–37)
BASOPHILS # BLD: 0 K/UL (ref 0–0.2)
BASOPHILS NFR BLD: 0 % (ref 0–2)
BILIRUB SERPL-MCNC: 0.5 MG/DL (ref 0.2–1.1)
BUN SERPL-MCNC: 12 MG/DL (ref 8–23)
CALCIUM SERPL-MCNC: 8.2 MG/DL (ref 8.3–10.4)
CHLORIDE SERPL-SCNC: 101 MMOL/L (ref 98–107)
CO2 SERPL-SCNC: 21 MMOL/L (ref 21–32)
CREAT SERPL-MCNC: 1.17 MG/DL (ref 0.8–1.5)
DIFFERENTIAL METHOD BLD: ABNORMAL
EOSINOPHIL # BLD: 0 K/UL (ref 0–0.8)
EOSINOPHIL NFR BLD: 0 % (ref 0.5–7.8)
ERYTHROCYTE [DISTWIDTH] IN BLOOD BY AUTOMATED COUNT: 14.1 % (ref 11.9–14.6)
FLUAV AG NPH QL IA: NEGATIVE
FLUBV AG NPH QL IA: NEGATIVE
GLOBULIN SER CALC-MCNC: 3.2 G/DL (ref 2.3–3.5)
GLUCOSE SERPL-MCNC: 227 MG/DL (ref 65–100)
HCT VFR BLD AUTO: 34.2 % (ref 41.1–50.3)
HGB BLD-MCNC: 11.3 G/DL (ref 13.6–17.2)
IMM GRANULOCYTES # BLD AUTO: 0.1 K/UL (ref 0–0.5)
IMM GRANULOCYTES NFR BLD AUTO: 1 % (ref 0–5)
LACTATE BLD-SCNC: 2.31 MMOL/L (ref 0.5–1.9)
LACTATE BLD-SCNC: 4.18 MMOL/L (ref 0.5–1.9)
LYMPHOCYTES # BLD: 0.7 K/UL (ref 0.5–4.6)
LYMPHOCYTES NFR BLD: 5 % (ref 13–44)
MCH RBC QN AUTO: 31.7 PG (ref 26.1–32.9)
MCHC RBC AUTO-ENTMCNC: 33 G/DL (ref 31.4–35)
MCV RBC AUTO: 96.1 FL (ref 79.6–97.8)
MONOCYTES # BLD: 1.3 K/UL (ref 0.1–1.3)
MONOCYTES NFR BLD: 9 % (ref 4–12)
NEUTS SEG # BLD: 12.5 K/UL (ref 1.7–8.2)
NEUTS SEG NFR BLD: 85 % (ref 43–78)
NRBC # BLD: 0 K/UL (ref 0–0.2)
PLATELET # BLD AUTO: 172 K/UL (ref 150–450)
PMV BLD AUTO: 10.8 FL (ref 9.4–12.3)
POTASSIUM SERPL-SCNC: 3.8 MMOL/L (ref 3.5–5.1)
PROT SERPL-MCNC: 6.9 G/DL (ref 6.3–8.2)
RBC # BLD AUTO: 3.56 M/UL (ref 4.23–5.6)
SODIUM SERPL-SCNC: 136 MMOL/L (ref 136–145)
SPECIMEN SOURCE: NORMAL
WBC # BLD AUTO: 14.6 K/UL (ref 4.3–11.1)

## 2019-02-08 PROCEDURE — 74011250637 HC RX REV CODE- 250/637: Performed by: EMERGENCY MEDICINE

## 2019-02-08 PROCEDURE — 83605 ASSAY OF LACTIC ACID: CPT

## 2019-02-08 PROCEDURE — 87040 BLOOD CULTURE FOR BACTERIA: CPT

## 2019-02-08 PROCEDURE — 74011000258 HC RX REV CODE- 258: Performed by: EMERGENCY MEDICINE

## 2019-02-08 PROCEDURE — 87804 INFLUENZA ASSAY W/OPTIC: CPT

## 2019-02-08 PROCEDURE — 80307 DRUG TEST PRSMV CHEM ANLYZR: CPT

## 2019-02-08 PROCEDURE — 80053 COMPREHEN METABOLIC PANEL: CPT

## 2019-02-08 PROCEDURE — 74011250636 HC RX REV CODE- 250/636: Performed by: EMERGENCY MEDICINE

## 2019-02-08 PROCEDURE — 71046 X-RAY EXAM CHEST 2 VIEWS: CPT

## 2019-02-08 PROCEDURE — 85025 COMPLETE CBC W/AUTO DIFF WBC: CPT

## 2019-02-08 PROCEDURE — 99285 EMERGENCY DEPT VISIT HI MDM: CPT | Performed by: EMERGENCY MEDICINE

## 2019-02-08 PROCEDURE — 65660000000 HC RM CCU STEPDOWN

## 2019-02-08 RX ORDER — ACETAMINOPHEN 500 MG
1000 TABLET ORAL
Status: COMPLETED | OUTPATIENT
Start: 2019-02-08 | End: 2019-02-08

## 2019-02-08 RX ORDER — IPRATROPIUM BROMIDE AND ALBUTEROL SULFATE 2.5; .5 MG/3ML; MG/3ML
3 SOLUTION RESPIRATORY (INHALATION)
Status: COMPLETED | OUTPATIENT
Start: 2019-02-08 | End: 2019-02-09

## 2019-02-08 RX ORDER — SODIUM CHLORIDE 9 MG/ML
100 INJECTION, SOLUTION INTRAVENOUS CONTINUOUS
Status: DISCONTINUED | OUTPATIENT
Start: 2019-02-09 | End: 2019-02-10

## 2019-02-08 RX ORDER — IPRATROPIUM BROMIDE AND ALBUTEROL SULFATE 2.5; .5 MG/3ML; MG/3ML
3 SOLUTION RESPIRATORY (INHALATION)
Status: DISCONTINUED | OUTPATIENT
Start: 2019-02-09 | End: 2019-02-11 | Stop reason: HOSPADM

## 2019-02-08 RX ADMIN — SODIUM CHLORIDE 1000 ML: 900 INJECTION, SOLUTION INTRAVENOUS at 23:28

## 2019-02-08 RX ADMIN — ACETAMINOPHEN 1000 MG: 500 TABLET, FILM COATED ORAL at 23:57

## 2019-02-08 RX ADMIN — PIPERACILLIN SODIUM,TAZOBACTAM SODIUM 4.5 G: 4; .5 INJECTION, POWDER, FOR SOLUTION INTRAVENOUS at 23:57

## 2019-02-09 LAB
ANION GAP SERPL CALC-SCNC: 10 MMOL/L (ref 7–16)
BASOPHILS # BLD: 0 K/UL (ref 0–0.2)
BASOPHILS NFR BLD: 0 % (ref 0–2)
BUN SERPL-MCNC: 20 MG/DL (ref 8–23)
CALCIUM SERPL-MCNC: 8.6 MG/DL (ref 8.3–10.4)
CHLORIDE SERPL-SCNC: 98 MMOL/L (ref 98–107)
CO2 SERPL-SCNC: 26 MMOL/L (ref 21–32)
CREAT SERPL-MCNC: 1.77 MG/DL (ref 0.8–1.5)
DIFFERENTIAL METHOD BLD: ABNORMAL
EOSINOPHIL # BLD: 0 K/UL (ref 0–0.8)
EOSINOPHIL NFR BLD: 0 % (ref 0.5–7.8)
ERYTHROCYTE [DISTWIDTH] IN BLOOD BY AUTOMATED COUNT: 14.2 % (ref 11.9–14.6)
ETHANOL SERPL-MCNC: 10 MG/DL
GLUCOSE BLD STRIP.AUTO-MCNC: 361 MG/DL (ref 65–100)
GLUCOSE BLD STRIP.AUTO-MCNC: 390 MG/DL (ref 65–100)
GLUCOSE BLD STRIP.AUTO-MCNC: 476 MG/DL (ref 65–100)
GLUCOSE BLD STRIP.AUTO-MCNC: 539 MG/DL (ref 65–100)
GLUCOSE SERPL-MCNC: 515 MG/DL (ref 65–100)
HCT VFR BLD AUTO: 34 % (ref 41.1–50.3)
HGB BLD-MCNC: 11.1 G/DL (ref 13.6–17.2)
IMM GRANULOCYTES # BLD AUTO: 0.3 K/UL (ref 0–0.5)
IMM GRANULOCYTES NFR BLD AUTO: 2 % (ref 0–5)
LACTATE SERPL-SCNC: 2.4 MMOL/L (ref 0.4–2)
LACTATE SERPL-SCNC: 3.4 MMOL/L (ref 0.4–2)
LACTATE SERPL-SCNC: 4.7 MMOL/L (ref 0.4–2)
LYMPHOCYTES # BLD: 0.6 K/UL (ref 0.5–4.6)
LYMPHOCYTES NFR BLD: 3 % (ref 13–44)
MCH RBC QN AUTO: 31.9 PG (ref 26.1–32.9)
MCHC RBC AUTO-ENTMCNC: 32.6 G/DL (ref 31.4–35)
MCV RBC AUTO: 97.7 FL (ref 79.6–97.8)
MONOCYTES # BLD: 1.2 K/UL (ref 0.1–1.3)
MONOCYTES NFR BLD: 6 % (ref 4–12)
NEUTS SEG # BLD: 17.8 K/UL (ref 1.7–8.2)
NEUTS SEG NFR BLD: 89 % (ref 43–78)
NRBC # BLD: 0 K/UL (ref 0–0.2)
PLATELET # BLD AUTO: 188 K/UL (ref 150–450)
PMV BLD AUTO: 10.8 FL (ref 9.4–12.3)
POTASSIUM SERPL-SCNC: 4.9 MMOL/L (ref 3.5–5.1)
RBC # BLD AUTO: 3.48 M/UL (ref 4.23–5.6)
SODIUM SERPL-SCNC: 134 MMOL/L (ref 136–145)
WBC # BLD AUTO: 20 K/UL (ref 4.3–11.1)

## 2019-02-09 PROCEDURE — 74011250637 HC RX REV CODE- 250/637: Performed by: FAMILY MEDICINE

## 2019-02-09 PROCEDURE — 74011636637 HC RX REV CODE- 636/637: Performed by: INTERNAL MEDICINE

## 2019-02-09 PROCEDURE — 74011250636 HC RX REV CODE- 250/636: Performed by: FAMILY MEDICINE

## 2019-02-09 PROCEDURE — 87040 BLOOD CULTURE FOR BACTERIA: CPT

## 2019-02-09 PROCEDURE — 36415 COLL VENOUS BLD VENIPUNCTURE: CPT

## 2019-02-09 PROCEDURE — 83605 ASSAY OF LACTIC ACID: CPT

## 2019-02-09 PROCEDURE — 82962 GLUCOSE BLOOD TEST: CPT

## 2019-02-09 PROCEDURE — 74011250636 HC RX REV CODE- 250/636: Performed by: INTERNAL MEDICINE

## 2019-02-09 PROCEDURE — 85025 COMPLETE CBC W/AUTO DIFF WBC: CPT

## 2019-02-09 PROCEDURE — 74011636637 HC RX REV CODE- 636/637: Performed by: FAMILY MEDICINE

## 2019-02-09 PROCEDURE — 94760 N-INVAS EAR/PLS OXIMETRY 1: CPT

## 2019-02-09 PROCEDURE — 94640 AIRWAY INHALATION TREATMENT: CPT

## 2019-02-09 PROCEDURE — 65660000000 HC RM CCU STEPDOWN

## 2019-02-09 PROCEDURE — 80048 BASIC METABOLIC PNL TOTAL CA: CPT

## 2019-02-09 PROCEDURE — 77030020263 HC SOL INJ SOD CL0.9% LFCR 1000ML

## 2019-02-09 PROCEDURE — 77010033678 HC OXYGEN DAILY

## 2019-02-09 PROCEDURE — 74011000258 HC RX REV CODE- 258: Performed by: FAMILY MEDICINE

## 2019-02-09 PROCEDURE — 74011000250 HC RX REV CODE- 250: Performed by: EMERGENCY MEDICINE

## 2019-02-09 PROCEDURE — 77030018836 HC SOL IRR NACL ICUM -A

## 2019-02-09 RX ORDER — SODIUM CHLORIDE 0.9 % (FLUSH) 0.9 %
5-40 SYRINGE (ML) INJECTION EVERY 8 HOURS
Status: DISCONTINUED | OUTPATIENT
Start: 2019-02-09 | End: 2019-02-11 | Stop reason: HOSPADM

## 2019-02-09 RX ORDER — CLOPIDOGREL BISULFATE 75 MG/1
75 TABLET ORAL DAILY
Status: DISCONTINUED | OUTPATIENT
Start: 2019-02-09 | End: 2019-02-11 | Stop reason: HOSPADM

## 2019-02-09 RX ORDER — HYDROCHLOROTHIAZIDE 25 MG/1
25 TABLET ORAL DAILY
Status: DISCONTINUED | OUTPATIENT
Start: 2019-02-09 | End: 2019-02-09

## 2019-02-09 RX ORDER — NITROGLYCERIN 0.4 MG/1
0.4 TABLET SUBLINGUAL
Status: DISCONTINUED | OUTPATIENT
Start: 2019-02-09 | End: 2019-02-11 | Stop reason: HOSPADM

## 2019-02-09 RX ORDER — INSULIN LISPRO 100 [IU]/ML
INJECTION, SOLUTION INTRAVENOUS; SUBCUTANEOUS
Status: DISCONTINUED | OUTPATIENT
Start: 2019-02-09 | End: 2019-02-09

## 2019-02-09 RX ORDER — NALOXONE HYDROCHLORIDE 0.4 MG/ML
0.4 INJECTION, SOLUTION INTRAMUSCULAR; INTRAVENOUS; SUBCUTANEOUS AS NEEDED
Status: DISCONTINUED | OUTPATIENT
Start: 2019-02-09 | End: 2019-02-11 | Stop reason: HOSPADM

## 2019-02-09 RX ORDER — LORATADINE 10 MG/1
10 TABLET ORAL DAILY
Status: DISCONTINUED | OUTPATIENT
Start: 2019-02-09 | End: 2019-02-11 | Stop reason: HOSPADM

## 2019-02-09 RX ORDER — INSULIN GLARGINE 100 [IU]/ML
20 INJECTION, SOLUTION SUBCUTANEOUS DAILY
Status: DISCONTINUED | OUTPATIENT
Start: 2019-02-09 | End: 2019-02-10

## 2019-02-09 RX ORDER — BISACODYL 5 MG
5 TABLET, DELAYED RELEASE (ENTERIC COATED) ORAL DAILY PRN
Status: DISCONTINUED | OUTPATIENT
Start: 2019-02-09 | End: 2019-02-11 | Stop reason: HOSPADM

## 2019-02-09 RX ORDER — AMLODIPINE BESYLATE 10 MG/1
10 TABLET ORAL DAILY
Status: DISCONTINUED | OUTPATIENT
Start: 2019-02-09 | End: 2019-02-11 | Stop reason: HOSPADM

## 2019-02-09 RX ORDER — CILOSTAZOL 50 MG/1
50 TABLET ORAL
Status: DISCONTINUED | OUTPATIENT
Start: 2019-02-09 | End: 2019-02-11 | Stop reason: HOSPADM

## 2019-02-09 RX ORDER — ROSUVASTATIN CALCIUM 5 MG/1
10 TABLET, COATED ORAL
Status: DISCONTINUED | OUTPATIENT
Start: 2019-02-09 | End: 2019-02-11 | Stop reason: HOSPADM

## 2019-02-09 RX ORDER — BUDESONIDE 0.5 MG/2ML
500 INHALANT ORAL
Status: DISCONTINUED | OUTPATIENT
Start: 2019-02-09 | End: 2019-02-11 | Stop reason: HOSPADM

## 2019-02-09 RX ORDER — DIPHENHYDRAMINE HYDROCHLORIDE 50 MG/ML
12.5 INJECTION, SOLUTION INTRAMUSCULAR; INTRAVENOUS
Status: DISCONTINUED | OUTPATIENT
Start: 2019-02-09 | End: 2019-02-11 | Stop reason: HOSPADM

## 2019-02-09 RX ORDER — INSULIN LISPRO 100 [IU]/ML
8 INJECTION, SOLUTION INTRAVENOUS; SUBCUTANEOUS
Status: DISCONTINUED | OUTPATIENT
Start: 2019-02-09 | End: 2019-02-11 | Stop reason: HOSPADM

## 2019-02-09 RX ORDER — ONDANSETRON 2 MG/ML
4 INJECTION INTRAMUSCULAR; INTRAVENOUS
Status: DISCONTINUED | OUTPATIENT
Start: 2019-02-09 | End: 2019-02-11 | Stop reason: HOSPADM

## 2019-02-09 RX ORDER — MORPHINE SULFATE 2 MG/ML
1 INJECTION, SOLUTION INTRAMUSCULAR; INTRAVENOUS
Status: DISCONTINUED | OUTPATIENT
Start: 2019-02-09 | End: 2019-02-11 | Stop reason: HOSPADM

## 2019-02-09 RX ORDER — HYDROCODONE BITARTRATE AND ACETAMINOPHEN 5; 325 MG/1; MG/1
1 TABLET ORAL
Status: DISCONTINUED | OUTPATIENT
Start: 2019-02-09 | End: 2019-02-11 | Stop reason: HOSPADM

## 2019-02-09 RX ORDER — GLIPIZIDE 10 MG/1
10 TABLET, FILM COATED, EXTENDED RELEASE ORAL
Status: DISCONTINUED | OUTPATIENT
Start: 2019-02-09 | End: 2019-02-09

## 2019-02-09 RX ORDER — ALBUTEROL SULFATE 0.83 MG/ML
2.5 SOLUTION RESPIRATORY (INHALATION)
Status: DISCONTINUED | OUTPATIENT
Start: 2019-02-09 | End: 2019-02-11 | Stop reason: HOSPADM

## 2019-02-09 RX ORDER — HEPARIN SODIUM 5000 [USP'U]/ML
5000 INJECTION, SOLUTION INTRAVENOUS; SUBCUTANEOUS EVERY 8 HOURS
Status: DISCONTINUED | OUTPATIENT
Start: 2019-02-09 | End: 2019-02-11 | Stop reason: HOSPADM

## 2019-02-09 RX ORDER — INSULIN LISPRO 100 [IU]/ML
INJECTION, SOLUTION INTRAVENOUS; SUBCUTANEOUS
Status: DISCONTINUED | OUTPATIENT
Start: 2019-02-09 | End: 2019-02-11 | Stop reason: HOSPADM

## 2019-02-09 RX ORDER — MONTELUKAST SODIUM 10 MG/1
10 TABLET ORAL
Status: DISCONTINUED | OUTPATIENT
Start: 2019-02-09 | End: 2019-02-11 | Stop reason: HOSPADM

## 2019-02-09 RX ORDER — METFORMIN HYDROCHLORIDE 500 MG/1
1000 TABLET ORAL 2 TIMES DAILY WITH MEALS
Status: DISCONTINUED | OUTPATIENT
Start: 2019-02-09 | End: 2019-02-09

## 2019-02-09 RX ORDER — INSULIN LISPRO 100 [IU]/ML
10 INJECTION, SOLUTION INTRAVENOUS; SUBCUTANEOUS ONCE
Status: COMPLETED | OUTPATIENT
Start: 2019-02-09 | End: 2019-02-09

## 2019-02-09 RX ORDER — SODIUM CHLORIDE 0.9 % (FLUSH) 0.9 %
5-40 SYRINGE (ML) INJECTION AS NEEDED
Status: DISCONTINUED | OUTPATIENT
Start: 2019-02-09 | End: 2019-02-11 | Stop reason: HOSPADM

## 2019-02-09 RX ORDER — PREDNISONE 20 MG/1
40 TABLET ORAL
Status: DISCONTINUED | OUTPATIENT
Start: 2019-02-09 | End: 2019-02-09

## 2019-02-09 RX ORDER — PREDNISONE 20 MG/1
20 TABLET ORAL
Status: DISCONTINUED | OUTPATIENT
Start: 2019-02-10 | End: 2019-02-10

## 2019-02-09 RX ORDER — VALSARTAN 320 MG/1
320 TABLET ORAL DAILY
Status: DISCONTINUED | OUTPATIENT
Start: 2019-02-09 | End: 2019-02-09

## 2019-02-09 RX ORDER — ASPIRIN 81 MG/1
81 TABLET ORAL
Status: DISCONTINUED | OUTPATIENT
Start: 2019-02-09 | End: 2019-02-11 | Stop reason: HOSPADM

## 2019-02-09 RX ORDER — ACETAMINOPHEN 325 MG/1
650 TABLET ORAL
Status: DISCONTINUED | OUTPATIENT
Start: 2019-02-09 | End: 2019-02-11 | Stop reason: HOSPADM

## 2019-02-09 RX ORDER — INSULIN LISPRO 100 [IU]/ML
5 INJECTION, SOLUTION INTRAVENOUS; SUBCUTANEOUS
Status: DISCONTINUED | OUTPATIENT
Start: 2019-02-09 | End: 2019-02-09

## 2019-02-09 RX ADMIN — HEPARIN SODIUM 5000 UNITS: 5000 INJECTION INTRAVENOUS; SUBCUTANEOUS at 16:48

## 2019-02-09 RX ADMIN — ROSUVASTATIN CALCIUM 10 MG: 5 TABLET, FILM COATED ORAL at 02:56

## 2019-02-09 RX ADMIN — GLIPIZIDE 10 MG: 10 TABLET, FILM COATED, EXTENDED RELEASE ORAL at 07:35

## 2019-02-09 RX ADMIN — ASPIRIN 81 MG: 81 TABLET, COATED ORAL at 02:56

## 2019-02-09 RX ADMIN — AZITHROMYCIN MONOHYDRATE 500 MG: 500 INJECTION, POWDER, LYOPHILIZED, FOR SOLUTION INTRAVENOUS at 05:07

## 2019-02-09 RX ADMIN — IPRATROPIUM BROMIDE AND ALBUTEROL SULFATE 3 ML: .5; 3 SOLUTION RESPIRATORY (INHALATION) at 00:11

## 2019-02-09 RX ADMIN — AMLODIPINE BESYLATE 10 MG: 10 TABLET ORAL at 08:42

## 2019-02-09 RX ADMIN — INSULIN LISPRO 10 UNITS: 100 INJECTION, SOLUTION INTRAVENOUS; SUBCUTANEOUS at 08:42

## 2019-02-09 RX ADMIN — Medication 10 ML: at 22:24

## 2019-02-09 RX ADMIN — CLOPIDOGREL BISULFATE 75 MG: 75 TABLET, FILM COATED ORAL at 08:42

## 2019-02-09 RX ADMIN — METHYLPREDNISOLONE SODIUM SUCCINATE 40 MG: 40 INJECTION, POWDER, FOR SOLUTION INTRAMUSCULAR; INTRAVENOUS at 02:55

## 2019-02-09 RX ADMIN — Medication 10 ML: at 05:11

## 2019-02-09 RX ADMIN — CEFTRIAXONE SODIUM 1 G: 1 INJECTION, POWDER, FOR SOLUTION INTRAMUSCULAR; INTRAVENOUS at 05:11

## 2019-02-09 RX ADMIN — LORATADINE 10 MG: 10 TABLET ORAL at 08:42

## 2019-02-09 RX ADMIN — INSULIN GLARGINE 20 UNITS: 100 INJECTION, SOLUTION SUBCUTANEOUS at 08:39

## 2019-02-09 RX ADMIN — SODIUM CHLORIDE 500 ML: 900 INJECTION, SOLUTION INTRAVENOUS at 09:14

## 2019-02-09 RX ADMIN — INSULIN LISPRO 15 UNITS: 100 INJECTION, SOLUTION INTRAVENOUS; SUBCUTANEOUS at 22:23

## 2019-02-09 RX ADMIN — PREDNISONE 40 MG: 20 TABLET ORAL at 08:42

## 2019-02-09 RX ADMIN — INSULIN LISPRO 10 UNITS: 100 INJECTION, SOLUTION INTRAVENOUS; SUBCUTANEOUS at 22:24

## 2019-02-09 RX ADMIN — INSULIN LISPRO 8 UNITS: 100 INJECTION, SOLUTION INTRAVENOUS; SUBCUTANEOUS at 16:52

## 2019-02-09 RX ADMIN — Medication 10 ML: at 16:53

## 2019-02-09 RX ADMIN — INSULIN LISPRO 15 UNITS: 100 INJECTION, SOLUTION INTRAVENOUS; SUBCUTANEOUS at 11:22

## 2019-02-09 RX ADMIN — Medication 10 ML: at 05:12

## 2019-02-09 RX ADMIN — SODIUM CHLORIDE 100 ML/HR: 900 INJECTION, SOLUTION INTRAVENOUS at 02:55

## 2019-02-09 RX ADMIN — ROSUVASTATIN CALCIUM 10 MG: 5 TABLET, FILM COATED ORAL at 22:22

## 2019-02-09 RX ADMIN — SODIUM CHLORIDE 1000 ML: 900 INJECTION, SOLUTION INTRAVENOUS at 11:21

## 2019-02-09 RX ADMIN — Medication 10 ML: at 02:58

## 2019-02-09 RX ADMIN — INSULIN LISPRO 8 UNITS: 100 INJECTION, SOLUTION INTRAVENOUS; SUBCUTANEOUS at 11:22

## 2019-02-09 RX ADMIN — MONTELUKAST SODIUM 10 MG: 10 TABLET, COATED ORAL at 22:22

## 2019-02-09 RX ADMIN — VALSARTAN 320 MG: 320 TABLET, FILM COATED ORAL at 08:42

## 2019-02-09 RX ADMIN — CILOSTAZOL 50 MG: 50 TABLET ORAL at 07:35

## 2019-02-09 RX ADMIN — CILOSTAZOL 50 MG: 50 TABLET ORAL at 16:49

## 2019-02-09 RX ADMIN — SODIUM CHLORIDE 100 ML/HR: 900 INJECTION, SOLUTION INTRAVENOUS at 22:22

## 2019-02-09 RX ADMIN — INSULIN LISPRO 5 UNITS: 100 INJECTION, SOLUTION INTRAVENOUS; SUBCUTANEOUS at 08:39

## 2019-02-09 RX ADMIN — HYDROCHLOROTHIAZIDE 25 MG: 25 TABLET ORAL at 08:42

## 2019-02-09 RX ADMIN — ASPIRIN 81 MG: 81 TABLET, COATED ORAL at 21:00

## 2019-02-09 RX ADMIN — INSULIN LISPRO 15 UNITS: 100 INJECTION, SOLUTION INTRAVENOUS; SUBCUTANEOUS at 16:53

## 2019-02-09 RX ADMIN — HEPARIN SODIUM 5000 UNITS: 5000 INJECTION INTRAVENOUS; SUBCUTANEOUS at 08:43

## 2019-02-09 NOTE — PROGRESS NOTES
Care Management Interventions PCP Verified by CM: Yes Mode of Transport at Discharge: Self Transition of Care Consult (CM Consult): Discharge Planning Current Support Network: Lives with Spouse Confirm Follow Up Transport: Family Plan discussed with Pt/Family/Caregiver: Yes Freedom of Choice Offered: Yes Discharge Location Discharge Placement: Home This CM spoke with pt this day. Pt verified his PCP, insurance, emergency contact, and home address. Pt confirms no difficulty obtaining his medications in the community. Pt lives at home with spouse with no steps to enter. His home DME includes a CPAP. He confirms that at baseline he is independent with ADLs including bathing, dressing, and cooking as well as driving. We discussed discharge planning and pt reports his plan is to return home with spouse at discharge. He has no anticipated discharge needs voiced at this time. He confirms wife will be available to provide transport home. CM to continue to follow.

## 2019-02-09 NOTE — PROGRESS NOTES
02/09/19 0200 Dual Skin Pressure Injury Assessment Dual Skin Pressure Injury Assessment WDL Second Care Provider (Based on [address] [place] [address]) Staciatssharron Laureano 69 Skin Integumentary Skin Integumentary (WDL) WDL Skin Color Appropriate for ethnicity Skin Condition/Temp Warm;Dry Skin Integrity Intact Turgor Non-tenting Hair Growth Present Varicosities Absent Wound Prevention and Protection Methods Orientation of Wound Prevention Posterior Location of Wound Prevention Sacrum/Coccyx Dressing Present  No  
Wound Offloading (Prevention Methods) Repositioning;Turning

## 2019-02-09 NOTE — PROGRESS NOTES
TRANSFER - IN REPORT: 
 
Verbal report received from [facility] (name) on [facility]  being received from 6th Floor(unit) for routine progression of care Report consisted of patients Situation, Background, Assessment and  
Recommendations(SBAR). Information from the following report(s) SBAR was reviewed with the receiving nurse. Opportunity for questions and clarification was provided. Assessment completed upon patients arrival to unit and care assumed.

## 2019-02-09 NOTE — PROGRESS NOTES
Hourly rounds completed shift. All needs met. Bed low/locked. No c/o pain. Pt running NSR with PVCs on tele. Call light in reach. Will continue to monitor pt and give report to oncoming RN. Peripheral IV 02/09/19 Right Antecubital (Active) Site Assessment Clean, dry, & intact 2/9/2019  2:00 AM  
Phlebitis Assessment 0 2/9/2019  2:00 AM  
Infiltration Assessment 0 2/9/2019  2:00 AM  
Dressing Status Clean, dry, & intact 2/9/2019  2:00 AM  
Dressing Type Transparent; Topical skin adhesive 2/9/2019  2:00 AM  
Hub Color/Line Status Pink;Flushed;Patent 2/9/2019  2:00 AM  
   
Peripheral IV 02/09/19 Anterior;Distal;Right Forearm (Active) Site Assessment Clean, dry, & intact 2/9/2019  2:00 AM  
Phlebitis Assessment 0 2/9/2019  2:00 AM  
Infiltration Assessment 0 2/9/2019  2:00 AM  
Dressing Status Clean, dry, & intact 2/9/2019  2:00 AM  
Dressing Type Transparent;Tape 2/9/2019  2:00 AM  
Hub Color/Line Status Pink;Flushed;Patent 2/9/2019  2:00 AM  
   
Peripheral IV 02/09/19 Left Antecubital (Active) Site Assessment Clean, dry, & intact 2/9/2019  2:00 AM  
Phlebitis Assessment 0 2/9/2019  2:00 AM  
Infiltration Assessment 0 2/9/2019  2:00 AM  
Dressing Status Clean, dry, & intact 2/9/2019  2:00 AM  
Dressing Type Transparent;Tape 2/9/2019  2:00 AM  
Hub Color/Line Status Pink;Flushed;Patent 2/9/2019  2:00 AM

## 2019-02-09 NOTE — PROGRESS NOTES
Critical lab lactic acid of 4.7 received. Will alert hospitalist. Pt currently receiving infusion 500 cc NS bolus.

## 2019-02-09 NOTE — ED PROVIDER NOTES
69-year-old gentleman presents with concerns about fever, chills, and cough that started earlier today. Wife notes that he went to take some trash to the dump.  4:00 she also notes that he had several beers and then took some cough medicine because he has had a cough for about two weeks. She says he then got very shaky and active little confused and she thought maybe he was having a reaction to the cough medicine. He says that his cough has been present for 2-3 weeks and has become progressively where. He said he has had some clear sputum. Did not have any fevers until today. Patient does have a history of COPD. Elements of this note were created using speech recognition software. As such, errors of speech recognition may be present. Past Medical History:  
Diagnosis Date  AGUSTIN (acute kidney injury) ([facility] 9/15/2014  Allergic rhinitis, CAUSE UNSPECIFIED 11/10/2015  Asthma; SEASONAL 11/10/2015  Benign essential hypertension 11/10/2015  Benign neoplasm of colon 11/10/2015  CAD (coronary artery disease) 1998, 1999  
 mix2, 3 stents [id number]  CAD (coronary artery disease) 11/10/2015  Cardiomyopathy ([address] 11/10/2015  Complicated wound infection 11/10/2015  COPD /OTHER 11/10/2015  COPD exacerbation ([address] 10/12/2011  Diabetes mellitus type 2, controlled ([address] 11/10/2015  Diabetic neuropathy  11/10/2015  Disruption of wound, unspecified 10/9/2014  Encounter for long-term (current) use of other high-risk medications 11/10/2015  Extremity atherosclerosis with intermittent claudication ([address] 11/10/2015  H/O endarterectomy; 9/2014 11/10/2015  
 Hiatal hernia 11/10/2015  Hyperglycemia  11/10/2015  Hyperlipidemia 11/10/2015  Myocardial infarction [facility]) 1999  Myocardial infarction [facility]) (1999) 11/10/2015  Obesity, UNSPECIFIED 11/10/2015  Orthostatic hypotension 11/10/2015  Osteoarthritis 11/10/2015  Peripheral vascular disease ([facility] 2/2014; S/P BILAT FEMORAL 11/10/2015  PVC (premature ventricular contraction)  Rash 11/10/2014  Seroma complicating a procedure 10/2/2014  Sleep apnea   
 cpap  Uncontrolled type 2 diabetes mellitus ([facility] [address] 11/10/2015  Vertiginous syndromes & LABYRINTHINE DISORDERS/UNSPEC 11/10/2015 Past Surgical History:  
Procedure Laterality Date  ABDOMEN SURGERY PROC UNLISTED  1960  
 abdominal cyst removed [place]  
 stents x3  
 HX CORONARY [address]  HX CORONARY STENT PLACEMENT  12/05/2018 LCX OM1:2.5 x 12 Yoel MIRELA  
 HX HEART CATHETERIZATION  12/05/2018 LV EF=40%. LM:nl. LAD:30-40% mid stenosis. LCX OM1:95% stenosis. RCA:100% occlusion at RV branch.  VASCULAR SURGERY PROCEDURE UNLIST  9/11/14  
 sally femoral endarterectomy  VASCULAR SURGERY PROCEDURE UNLIST  11/5/14 Repair of right common femoral artery Family History:  
Problem Relation Age of Onset  Stroke Mother  Hypertension Mother  Breast Cancer Mother  Heart Attack Father  Heart Disease Father  Other Father DIVERTICULITIS  Lung Disease Brother   
     mesothioloma  Diabetes Sister  Crohn's Disease Sister Social History Socioeconomic History  Marital status:  Spouse name: Not on file  Number of children: Not on file  Years of education: Not on file  Highest education level: Not on file Social Needs  Financial resource strain: Not on file  Food insecurity - worry: Not on file  Food insecurity - inability: Not on file  Transportation needs - medical: Not on file  Transportation needs - non-medical: Not on file Occupational History  Occupation: retired  Occupation: [employer] Comment: Positive for Asbestos exposure  Occupation: [address] [facility] [employer] Tobacco Use  Smoking status: Former Smoker   Packs/day: 1.00  
 Years: 50.00 Pack years: 50.00 Last attempt to quit: 10/2/2011 Years since quittin.3  Smokeless tobacco: Current User Types: Chew Substance and Sexual Activity  Alcohol use: Yes Alcohol/week: 0.0 oz  
  Comment: rare  Drug use: No  
 Sexual activity: Not on file Other Topics Concern  Not on file Social History Narrative Lives with wife ALLERGIES: Daypro [oxaprozin] Review of Systems Constitutional: Positive for activity change, chills and fever. HENT: Negative for congestion and rhinorrhea. Eyes: Negative for discharge and redness. Respiratory: Positive for cough and shortness of breath. Cardiovascular: Negative for chest pain and palpitations. Gastrointestinal: Negative for nausea and vomiting. Skin: Negative for color change and wound. Psychiatric/Behavioral: Positive for confusion. Negative for agitation. Vitals:  
 19 BP: 121/76 Pulse: (!) 105 Resp: 24 Temp: (!) 100.6 °F (38.1 °C) SpO2: 91% Weight: 131.5 kg (290 lb) Height: 6' (1.829 m) Physical Exam  
Constitutional: He is oriented to person, place, and time. He appears well-developed and well-nourished. HENT:  
Head: Normocephalic and atraumatic. Eyes: Conjunctivae and EOM are normal. Pupils are equal, round, and reactive to light. Neck: Normal range of motion. Cardiovascular: Normal rate and regular rhythm. Pulmonary/Chest: Effort normal. No respiratory distress. He has wheezes. He has no rales. He exhibits no tenderness. Course bilateral breath sounds with scattered wheezes Abdominal: Soft. Bowel sounds are normal. There is no rebound and no guarding. Musculoskeletal: Normal range of motion. He exhibits no edema or tenderness. Lymphadenopathy:  
  He has no cervical adenopathy. Neurological: He is alert and oriented to person, place, and time. Skin: Skin is warm and dry. Psychiatric: He has a normal mood and affect. Nursing note and vitals reviewed. MDM Number of Diagnoses or Management Options Pneumonia due to infectious organism, unspecified laterality, unspecified part of lung:  
Severe sepsis [facility]):  
Diagnosis management comments: Patient is febrile here. His lactic acid initially was greater than four. That is come down to a little over two. We will start him on IV fluids and IV antibiotics. I will also give him a DuoNeb treatment. 11:34 PM 
I spoke with the hospitalist who kindly agreed to see the patient. Patient's repeat cardiovascular evaluation is unremarkable. The is only prolong tachycardic with a heart rate in the 90s. Capillary refill is normal.  Patient has a reduced ejection fraction and he is morbidly obese so I will gently hydrate him since his pressure is normal. 
 
  
 
Procedures

## 2019-02-09 NOTE — ROUTINE PROCESS
TRANSFER - OUT REPORT: 
 
Verbal report given to Sheila Cerna RN on Sergio Ou  being transferred to [address] [place] [address] for routine progression of care Report consisted of patients Situation, Background, Assessment and  
Recommendations(SBAR). Information from the following report(s) SBAR, ED Summary, [facility] and Recent Results was reviewed with the receiving nurse. Lines:  
Peripheral IV 02/09/19 Right Antecubital (Active) Peripheral IV 02/09/19 Anterior;Distal;Right Forearm (Active) Peripheral IV 02/09/19 Left Antecubital (Active) Opportunity for questions and clarification was provided. Patient transported with: 
 [facility]

## 2019-02-09 NOTE — ED TRIAGE NOTES
Pt arrives via [facility] from home, pt c/o cough congestion fever and SHOB. Pt is tachycardic, febrile and tachypenic. Pt given duoneb and solumedrol. Pt wife gave him benadryl and mucinex, ETOH on board.

## 2019-02-09 NOTE — H&P
Hospitalist H&P Note Admit Date:  2019 10:26 PM  
Name:  Yemi Escobar Age:  75 y.o. 
:  1945 MRN:  [id number] PCP:  Michael Chapman MD 
Treatment Team: Attending Provider: Sukhwinder Wade MD; Primary Nurse: Nava Ray, GALLITO Cough, chills HPI:  
75 yr old male pt with known h/o cad, copd, htn,dm type 2, pad and hyperlipidemia. Wife says pt had been having cough and chest congestion going on for past 3 weeks. Today when she came home- noticed pt was being restless/shking- thought that it could be allergic reaction from the 2 mucinex pills he took today(also took 3 beers). Wife gave him benadryl- didn't help- noticed he was shaking more- called ems- was brought er for further evaluation. Pt mild drowsy, easily arousable,c/o mild sob- otherwise no headache or dizziness or chest pain or nausea or vomiting or abd pain. Wbc 14.6,glucose 227,flu negative. Temp 100.6, heart rate 106. cxr- atelectasis vs consolidation Oxygen saturation 91% on 2 li/min Pt will be admitted for hypoxic resp failure, copd exa, pna- meets criteria for sepsis 10 systems reviewed and negative except as noted in HPI. Past Medical History:  
Diagnosis Date  AGUSTIN (acute kidney injury) ([facility] [address] 9/15/2014  Allergic rhinitis, CAUSE UNSPECIFIED 11/10/2015  Asthma; SEASONAL 11/10/2015  Benign essential hypertension 11/10/2015  Benign neoplasm of colon 11/10/2015  CAD (coronary artery disease) ,   
 mix2, 3 stents [id number]  CAD (coronary artery disease) 11/10/2015  Cardiomyopathy ([address] 11/10/2015  Complicated wound infection 11/10/2015  COPD /OTHER 11/10/2015  COPD exacerbation ([address] 10/12/2011  Diabetes mellitus type 2, controlled ([facility] [address] 11/10/2015  Diabetic neuropathy  11/10/2015  Disruption of wound, unspecified 10/9/2014  Encounter for long-term (current) use of other high-risk medications 11/10/2015  Extremity atherosclerosis with intermittent claudication [facility] 11/10/2015  H/O endarterectomy; 2014 11/10/2015  
 Hiatal hernia 11/10/2015  Hyperglycemia  11/10/2015  Hyperlipidemia 11/10/2015  Myocardial infarction [facility]) 1999  Myocardial infarction [facility]) () 11/10/2015  Obesity, UNSPECIFIED 11/10/2015  Orthostatic hypotension 11/10/2015  Osteoarthritis 11/10/2015  Peripheral vascular disease ([facility] 2014; S/P BILAT FEMORAL 11/10/2015  PVC (premature ventricular contraction)  Rash 11/10/2014  Seroma complicating a procedure 10/2/2014  Sleep apnea   
 cpap  Uncontrolled type 2 diabetes mellitus ([facility] 11/10/2015  Vertiginous syndromes & LABYRINTHINE DISORDERS/UNSPEC 11/10/2015 Past Surgical History:  
Procedure Laterality Date  ABDOMEN SURGERY PROC UNLISTED    
 [facility]  
 [facility]  
 HX CORONARY [address]  HX CORONARY STENT PLACEMENT  2018 LCX OM1:2.5 x 12 Yoel MIRELA  
 HX HEART CATHETERIZATION  2018 LV EF=40%. LM:nl. LAD:30-40% mid stenosis. LCX OM1:95% stenosis. RCA:100% occlusion at RV branch.  VASCULAR SURGERY PROCEDURE UNLIST  14  
 sally femoral endarterectomy  VASCULAR SURGERY PROCEDURE UNLIST  14 Repair of right common femoral artery Allergies Allergen Reactions  Daypro [Oxaprozin] Other (comments) ELEVATED BLOOD PRESSURE Social History Tobacco Use  Smoking status: Former Smoker Packs/day: 1.00 Years: 50.00 Pack years: 50.00 Last attempt to quit: 10/2/2011 Years since quittin.3  Smokeless tobacco: Current User Types: Chew Substance Use Topics  Alcohol use: Yes Alcohol/week: 0.0 oz  
  Comment: rare Family History Problem Relation Age of Onset  Stroke Mother  Hypertension Mother  Breast Cancer Mother  Heart Attack Father  Heart Disease Father  Other Father DIVERTICULITIS  Lung Disease Brother   
     mesothioloma  Diabetes Sister  Crohn's Disease Sister Immunization History Administered Date(s) Administered  Influenza High Dose Vaccine PF 11/17/2016, 08/11/2017, 09/13/2018  Influenza Vaccine 09/30/2010, 09/01/2013, 09/26/2014, 11/02/2014, 10/12/2015  Influenza Vaccine Split 10/17/2011  Pneumococcal Conjugate (PCV-13) 04/10/2015  Pneumococcal Polysaccharide (PPSV-23) 11/27/2007, 10/17/2011  ZZZ-RETIRED (DO NOT USE) Pneumococcal Vaccine (Unspecified Type) 10/17/2011  Zoster Recombinant 11/27/2018 PTA Medications: 
Prior to Admission Medications Prescriptions Last Dose Informant Patient Reported? Taking? ADVAIR DISKUS 250-50 mcg/dose diskus inhaler   No No  
Sig: INHALE 1 PUFF 2 TIMES A DAY, RINSE MOUTH AFTER USE FISH OIL CONCENTRATE 1,000 mg cap   Yes No  
Sig: Take 1-2 Caps by mouth two (2) times a day. One tab in am & 2 tabs in pm  
JANUVIA 100 mg tablet   No No  
Sig: TAKE ONE TABLET BY MOUTH EVERY DAY  
MULTIVITS-MINERALS/FA/LYCOPENE (ONE-A-DAY MEN'S MULTIVITAMIN PO)   Yes No  
Sig: Take 1 Tab by mouth daily. albuterol (PROAIR HFA) 90 mcg/actuation inhaler   No No  
Sig: USE 2 PUFFS FOUR TIMES DAILY AS NEEDED. amLODIPine-Valsartan-HCTZ -25 mg tab   No No  
Sig: TAKE 1/2 TO 1 TABLET BY MOUTH EVERY DAY Patient taking differently: TAKE 1 TABLET BY MOUTH EVERY DAY  
aspirin 81 mg tablet   Yes No  
Sig: Take 81 mg by mouth nightly. cetirizine (ZYRTEC) 10 mg tablet   Yes No  
Sig: Take  by mouth as needed for Allergies. cholecalciferol, vitamin D3, (VITAMIN D3) 2,000 unit tab   Yes No  
Sig: Take 2,000 Units by mouth nightly. cilostazol (PLETAL) 50 mg tablet   No No  
Sig: TAKE 1 TABLET BY MOUTH TWICE DAILY. clopidogrel (PLAVIX) 75 mg tab   No No  
Sig: TAKE ONE TABLET BY MOUTH EVERY DAY  
cpap machine kit   Yes No  
Sig: Take  by inhalation.  qhs  
 diclofenac (VOLTAREN) 1 % gel   No No  
Sig: Apply 4 g to affected area four (4) times daily as needed. fluticasone (FLONASE) 50 mcg/actuation nasal spray   No No  
Sig: USE 1 OR 2 SPRAYS IN EACH NOSTRIL EVERY DAY  
garlic 3,239 mg cap   Yes No  
Sig: Take 1 Tab by mouth two (2) times a day. glipiZIDE SR (GLUCOTROL XL) 10 mg CR tablet   No No  
Sig: TAKE ONE TABLET BY MOUTH 2 TIMES A DAY  
glucosamine/chondr funez A sod (GLUCOSAMINE-CHONDROITIN) 750-600 mg tab   Yes No  
Sig: Take  by mouth two (2) times a day. glucose blood VI test strips (ASCENSIA AUTODISC VI, ONE TOUCH ULTRA TEST VI) strip   No No  
Sig: E11.9  
meclizine (ANTIVERT) 25 mg tablet   Yes No  
Sig: Take 25 mg by mouth three (3) times daily as needed. metFORMIN (GLUCOPHAGE) 500 mg tablet   No No  
Sig: TAKE TWO TABLETS BY MOUTH 2 TIMES A DAY  
montelukast (SINGULAIR) 10 mg tablet   No No  
Si po q hs  
nitroglycerin (NITROSTAT) 0.4 mg SL tablet   Yes No  
Si.4 mg by SubLINGual route every five (5) minutes as needed for Chest Pain. rosuvastatin (CRESTOR) 10 mg tablet   No No  
Sig: TAKE ONE TABLET BY MOUTH ONCE A DAY Facility-Administered Medications: None Objective:  
 
Patient Vitals for the past 24 hrs: 
 Temp Pulse Resp BP SpO2  
19 (!) 100.6 °F (38.1 °C) (!) 105 24 121/76 91 % Oxygen Therapy O2 Sat (%): 91 % (19) O2 Device: Nasal cannula (19) O2 Flow Rate (L/min): 2 l/min (19) No intake or output data in the 24 hours ending 19 Physical Exam: 
General:    Well nourished. Alert. On oxygen, mild resp distress, drowsy, easily arousable Eyes:   Normal sclera. Extraocular movements intact. ENT:  Normocephalic, atraumatic. Moist mucous membranes CV:   RRR. No murmur, rub, or gallop. Lungs:  Bilaterally wheezing, coarse breath sounds Abdomen: Soft, nontender, nondistended. Bowel sounds normal.obese Extremities: Warm and dry. No cyanosis or edema. Neurologic: CN II-XII grossly intact. Sensation intact. Skin:     No rashes or jaundice. Psych:  Normal mood and affect. I reviewed the labs, imaging. Data Review:  
Recent Results (from the past 24 hour(s)) INFLUENZA A & B AG (RAPID TEST) Collection Time: 02/08/19  9:00 PM  
Result Value Ref Range Influenza A Ag NEGATIVE  NEG Influenza B Ag NEGATIVE  NEG Source NASOPHARYNGEAL    
CBC WITH AUTOMATED DIFF Collection Time: 02/08/19  9:05 PM  
Result Value Ref Range WBC 14.6 (H) 4.3 - 11.1 K/uL  
 RBC 3.56 (L) 4.23 - 5.6 M/uL  
 HGB 11.3 (L) 13.6 - 17.2 g/dL HCT 34.2 (L) 41.1 - 50.3 % MCV 96.1 79.6 - 97.8 FL  
 MCH 31.7 26.1 - 32.9 PG  
 MCHC 33.0 31.4 - 35.0 g/dL  
 RDW 14.1 11.9 - 14.6 % PLATELET 092 013 - 515 K/uL MPV 10.8 9.4 - 12.3 FL ABSOLUTE NRBC 0.00 0.0 - 0.2 K/uL  
 DF AUTOMATED NEUTROPHILS 85 (H) 43 - 78 % LYMPHOCYTES 5 (L) 13 - 44 % MONOCYTES 9 4.0 - 12.0 % EOSINOPHILS 0 (L) 0.5 - 7.8 % BASOPHILS 0 0.0 - 2.0 % IMMATURE GRANULOCYTES 1 0.0 - 5.0 %  
 ABS. NEUTROPHILS 12.5 (H) 1.7 - 8.2 K/UL  
 ABS. LYMPHOCYTES 0.7 0.5 - 4.6 K/UL  
 ABS. MONOCYTES 1.3 0.1 - 1.3 K/UL  
 ABS. EOSINOPHILS 0.0 0.0 - 0.8 K/UL  
 ABS. BASOPHILS 0.0 0.0 - 0.2 K/UL  
 ABS. IMM. GRANS. 0.1 0.0 - 0.5 K/UL METABOLIC PANEL, COMPREHENSIVE Collection Time: 02/08/19  9:05 PM  
Result Value Ref Range Sodium 136 136 - 145 mmol/L Potassium 3.8 3.5 - 5.1 mmol/L Chloride 101 98 - 107 mmol/L  
 CO2 21 21 - 32 mmol/L Anion gap 14 7 - 16 mmol/L Glucose 227 (H) 65 - 100 mg/dL BUN 12 8 - 23 MG/DL Creatinine 1.17 0.8 - 1.5 MG/DL  
 GFR est AA >60 >60 ml/min/1.73m2 GFR est non-AA >60 >60 ml/min/1.73m2 Calcium 8.2 (L) 8.3 - 10.4 MG/DL Bilirubin, total 0.5 0.2 - 1.1 MG/DL  
 ALT (SGPT) 34 12 - 65 U/L  
 AST (SGOT) 14 (L) 15 - 37 U/L Alk. phosphatase 55 50 - 136 U/L Protein, total 6.9 6.3 - 8.2 g/dL Albumin 3.7 3.2 - 4.6 g/dL Globulin 3.2 2.3 - 3.5 g/dL A-G Ratio 1.2 1.2 - 3.5 POC LACTIC ACID Collection Time: 02/08/19  9:10 PM  
Result Value Ref Range Lactic Acid (POC) 4.18 (H) 0.5 - 1.9 mmol/L POC LACTIC ACID Collection Time: 02/08/19 11:09 PM  
Result Value Ref Range Lactic Acid (POC) 2.31 (H) 0.5 - 1.9 mmol/L All Micro Results Procedure Component Value Units Date/Time CULTURE, BLOOD [[id number]] Collected:  02/08/19 2303 Order Status:  Completed Specimen:  Blood Updated:  02/08/19 2312 INFLUENZA A & B AG (RAPID TEST) [[id number]] Collected:  02/08/19 2100 Order Status:  Completed Specimen:  Nasopharyngeal from Nasal washing Updated:  02/08/19 2152 Influenza A Ag NEGATIVE Comment: NEGATIVE FOR THE PRESENCE OF INFLUENZA A ANTIGEN 
INFECTION DUE TO INFLUENZA A CANNOT BE RULED OUT. BECAUSE THE ANTIGEN PRESENT IN THE SAMPLE MAY BE BELOW 
THE DETECTION LIMIT OF THE TEST. A NEGATIVE TEST IS PRESUMPTIVE AND IT IS RECOMMENDED THAT THESE RESULTS BE CONFIRMED BY VIRAL CULTURE OR AN FDA-CLEARED INFLUENZA A AND B MOLECULAR ASSAY. Influenza B Ag NEGATIVE Comment: NEGATIVE FOR THE PRESENCE OF INFLUENZA B ANTIGEN 
INFECTION DUE TO INFLUENZA B CANNOT BE RULED OUT. BECAUSE THE ANTIGEN PRESENT IN THE SAMPLE MAY BE BELOW 
THE DETECTION LIMIT OF THE TEST. A NEGATIVE TEST IS PRESUMPTIVE AND IT IS RECOMMENDED THAT THESE RESULTS BE CONFIRMED BY VIRAL CULTURE OR AN FDA-CLEARED INFLUENZA A AND B MOLECULAR ASSAY. Source NASOPHARYNGEAL     
 CULTURE, BLOOD [[id number]] Order Status:  Sent Specimen:  Blood Other Studies: Xr Chest Pa Lat Result Date: 2/8/2019 AP LATERAL CHEST  2/8/2019 9:52 PM HISTORY:  cough congestion COMPARISON: 11/10/2014 FINDINGS: Exposure technique is suboptimal. There is atelectasis or consolidation in the left lower lobe. There are no large pleural collections. Pulmonary vascularity is grossly within normal limits. IMPRESSION: Retrocardiac atelectasis or consolidation. Assessment and Plan:  
 
Hospital Problems as of 2/8/2019 Date Reviewed: 12/20/2018 Codes Class Noted - Resolved POA Hypoxia ICD-10-CM: R09.02 
ICD-9-CM: 799.02  2/8/2019 - Present Unknown * (Principal) Sepsis ([facility] ICD-10-CM: A41.9 ICD-9-CM: 038.9, 995.91  2/8/2019 - Present Unknown PNA (pneumonia) ICD-10-CM: J18.9 ICD-9-CM: 993  2/8/2019 - Present Unknown Pulmonary emphysema ([facility] ICD-10-CM: J43.9 ICD-9-CM: 492.8  11/17/2016 - Present Yes PAD (peripheral artery disease) (HCC) ICD-10-CM: I73.9 ICD-9-CM: 443.9  11/10/2015 - Present Yes Diabetic neuropathy  ICD-10-CM: E11.40 ICD-9-CM: 250.60, 357.2  11/10/2015 - Present Yes COPD (chronic obstructive pulmonary disease) (HCC) ICD-10-CM: J44.9 ICD-9-CM: 375  4/10/2015 - Present Yes DM (diabetes mellitus) type II, controlled, with peripheral vascular disorder ([facility] (Chronic) ICD-10-CM: E11.51 
ICD-9-CM: 250.70, 443.81  9/11/2014 - Present Yes Ischemic cardiomyopathy (Chronic) ICD-10-CM: I25.5 ICD-9-CM: 414.8  10/12/2011 - Present Yes Coronary atherosclerosis of native coronary artery (Chronic) ICD-10-CM: I25.10 ICD-9-CM: 414.01  10/12/2011 - Present Yes HTN (hypertension) (Chronic) ICD-10-CM: I10 
ICD-9-CM: 401.9  10/12/2011 - Present Yes Sleep apnea (Chronic) ICD-10-CM: G47.30 ICD-9-CM: 780.57  10/12/2011 - Present Yes Morbid obesity ([facility] (Chronic) ICD-10-CM: E66.01 
ICD-9-CM: 278.01  10/12/2011 - Present Yes PLAN: 
pna- rocpehin and zithromax Hypoxic resp failure- from pna- titrate oxygen to>90% Mild copd exa- duonebs,steroids Dm type 2- cont home medications Cad Pad 
htn Advanced life care discussed with pt - pt is full code. DVT ppx:  heparin Anticipated DC needs:   
Code status:  Full Estimated LOS:  Greater than 2 midnights Risk:  high Signed: 
Irwin Ann MD

## 2019-02-10 LAB
ANION GAP SERPL CALC-SCNC: 6 MMOL/L (ref 7–16)
BASOPHILS # BLD: 0 K/UL (ref 0–0.2)
BASOPHILS NFR BLD: 0 % (ref 0–2)
BUN SERPL-MCNC: 18 MG/DL (ref 8–23)
CALCIUM SERPL-MCNC: 8.5 MG/DL (ref 8.3–10.4)
CHLORIDE SERPL-SCNC: 105 MMOL/L (ref 98–107)
CO2 SERPL-SCNC: 27 MMOL/L (ref 21–32)
CREAT SERPL-MCNC: 1.14 MG/DL (ref 0.8–1.5)
DIFFERENTIAL METHOD BLD: ABNORMAL
EOSINOPHIL # BLD: 0 K/UL (ref 0–0.8)
EOSINOPHIL NFR BLD: 0 % (ref 0.5–7.8)
ERYTHROCYTE [DISTWIDTH] IN BLOOD BY AUTOMATED COUNT: 14 % (ref 11.9–14.6)
GLUCOSE BLD STRIP.AUTO-MCNC: 229 MG/DL (ref 65–100)
GLUCOSE BLD STRIP.AUTO-MCNC: 235 MG/DL (ref 65–100)
GLUCOSE BLD STRIP.AUTO-MCNC: 253 MG/DL (ref 65–100)
GLUCOSE BLD STRIP.AUTO-MCNC: 354 MG/DL (ref 65–100)
GLUCOSE SERPL-MCNC: 257 MG/DL (ref 65–100)
HCT VFR BLD AUTO: 33.4 % (ref 41.1–50.3)
HGB BLD-MCNC: 10.7 G/DL (ref 13.6–17.2)
IMM GRANULOCYTES # BLD AUTO: 0.1 K/UL (ref 0–0.5)
IMM GRANULOCYTES NFR BLD AUTO: 1 % (ref 0–5)
LACTATE SERPL-SCNC: 2.8 MMOL/L (ref 0.4–2)
LACTATE SERPL-SCNC: 3.1 MMOL/L (ref 0.4–2)
LYMPHOCYTES # BLD: 1.4 K/UL (ref 0.5–4.6)
LYMPHOCYTES NFR BLD: 8 % (ref 13–44)
MCH RBC QN AUTO: 31.6 PG (ref 26.1–32.9)
MCHC RBC AUTO-ENTMCNC: 32 G/DL (ref 31.4–35)
MCV RBC AUTO: 98.5 FL (ref 79.6–97.8)
MONOCYTES # BLD: 1.2 K/UL (ref 0.1–1.3)
MONOCYTES NFR BLD: 7 % (ref 4–12)
NEUTS SEG # BLD: 13.9 K/UL (ref 1.7–8.2)
NEUTS SEG NFR BLD: 83 % (ref 43–78)
NRBC # BLD: 0 K/UL (ref 0–0.2)
PLATELET # BLD AUTO: 178 K/UL (ref 150–450)
PMV BLD AUTO: 10.7 FL (ref 9.4–12.3)
POTASSIUM SERPL-SCNC: 4 MMOL/L (ref 3.5–5.1)
RBC # BLD AUTO: 3.39 M/UL (ref 4.23–5.6)
SODIUM SERPL-SCNC: 138 MMOL/L (ref 136–145)
WBC # BLD AUTO: 16.7 K/UL (ref 4.3–11.1)

## 2019-02-10 PROCEDURE — 77030020263 HC SOL INJ SOD CL0.9% LFCR 1000ML

## 2019-02-10 PROCEDURE — 74011250637 HC RX REV CODE- 250/637: Performed by: FAMILY MEDICINE

## 2019-02-10 PROCEDURE — 65660000000 HC RM CCU STEPDOWN

## 2019-02-10 PROCEDURE — 80048 BASIC METABOLIC PNL TOTAL CA: CPT

## 2019-02-10 PROCEDURE — 82962 GLUCOSE BLOOD TEST: CPT

## 2019-02-10 PROCEDURE — 74011000258 HC RX REV CODE- 258: Performed by: FAMILY MEDICINE

## 2019-02-10 PROCEDURE — 74011636637 HC RX REV CODE- 636/637: Performed by: INTERNAL MEDICINE

## 2019-02-10 PROCEDURE — 94761 N-INVAS EAR/PLS OXIMETRY MLT: CPT

## 2019-02-10 PROCEDURE — 85025 COMPLETE CBC W/AUTO DIFF WBC: CPT

## 2019-02-10 PROCEDURE — 74011250636 HC RX REV CODE- 250/636: Performed by: FAMILY MEDICINE

## 2019-02-10 PROCEDURE — 83605 ASSAY OF LACTIC ACID: CPT

## 2019-02-10 RX ORDER — INSULIN GLARGINE 100 [IU]/ML
25 INJECTION, SOLUTION SUBCUTANEOUS DAILY
Status: DISCONTINUED | OUTPATIENT
Start: 2019-02-10 | End: 2019-02-11 | Stop reason: HOSPADM

## 2019-02-10 RX ADMIN — INSULIN GLARGINE 25 UNITS: 100 INJECTION, SOLUTION SUBCUTANEOUS at 08:19

## 2019-02-10 RX ADMIN — INSULIN LISPRO 9 UNITS: 100 INJECTION, SOLUTION INTRAVENOUS; SUBCUTANEOUS at 08:20

## 2019-02-10 RX ADMIN — AZITHROMYCIN MONOHYDRATE 500 MG: 500 INJECTION, POWDER, LYOPHILIZED, FOR SOLUTION INTRAVENOUS at 05:07

## 2019-02-10 RX ADMIN — HEPARIN SODIUM 5000 UNITS: 5000 INJECTION INTRAVENOUS; SUBCUTANEOUS at 08:18

## 2019-02-10 RX ADMIN — ASPIRIN 81 MG: 81 TABLET, COATED ORAL at 21:56

## 2019-02-10 RX ADMIN — HEPARIN SODIUM 5000 UNITS: 5000 INJECTION INTRAVENOUS; SUBCUTANEOUS at 16:57

## 2019-02-10 RX ADMIN — INSULIN LISPRO 8 UNITS: 100 INJECTION, SOLUTION INTRAVENOUS; SUBCUTANEOUS at 16:54

## 2019-02-10 RX ADMIN — INSULIN LISPRO 6 UNITS: 100 INJECTION, SOLUTION INTRAVENOUS; SUBCUTANEOUS at 21:57

## 2019-02-10 RX ADMIN — LORATADINE 10 MG: 10 TABLET ORAL at 08:16

## 2019-02-10 RX ADMIN — SODIUM CHLORIDE 100 ML/HR: 900 INJECTION, SOLUTION INTRAVENOUS at 08:28

## 2019-02-10 RX ADMIN — CILOSTAZOL 50 MG: 50 TABLET ORAL at 17:00

## 2019-02-10 RX ADMIN — INSULIN LISPRO 8 UNITS: 100 INJECTION, SOLUTION INTRAVENOUS; SUBCUTANEOUS at 08:22

## 2019-02-10 RX ADMIN — Medication 10 ML: at 13:43

## 2019-02-10 RX ADMIN — CEFTRIAXONE SODIUM 1 G: 1 INJECTION, POWDER, FOR SOLUTION INTRAMUSCULAR; INTRAVENOUS at 05:06

## 2019-02-10 RX ADMIN — MONTELUKAST SODIUM 10 MG: 10 TABLET, COATED ORAL at 21:56

## 2019-02-10 RX ADMIN — CLOPIDOGREL BISULFATE 75 MG: 75 TABLET, FILM COATED ORAL at 08:16

## 2019-02-10 RX ADMIN — HEPARIN SODIUM 5000 UNITS: 5000 INJECTION INTRAVENOUS; SUBCUTANEOUS at 01:25

## 2019-02-10 RX ADMIN — HEPARIN SODIUM 5000 UNITS: 5000 INJECTION INTRAVENOUS; SUBCUTANEOUS at 23:37

## 2019-02-10 RX ADMIN — Medication 10 ML: at 05:11

## 2019-02-10 RX ADMIN — Medication 10 ML: at 21:57

## 2019-02-10 RX ADMIN — AMLODIPINE BESYLATE 10 MG: 10 TABLET ORAL at 08:15

## 2019-02-10 RX ADMIN — INSULIN LISPRO 8 UNITS: 100 INJECTION, SOLUTION INTRAVENOUS; SUBCUTANEOUS at 11:58

## 2019-02-10 RX ADMIN — ACETAMINOPHEN 650 MG: 325 TABLET, FILM COATED ORAL at 08:59

## 2019-02-10 RX ADMIN — CILOSTAZOL 50 MG: 50 TABLET ORAL at 08:16

## 2019-02-10 RX ADMIN — ROSUVASTATIN CALCIUM 10 MG: 5 TABLET, FILM COATED ORAL at 21:56

## 2019-02-10 RX ADMIN — INSULIN LISPRO 6 UNITS: 100 INJECTION, SOLUTION INTRAVENOUS; SUBCUTANEOUS at 16:55

## 2019-02-10 RX ADMIN — INSULIN LISPRO 15 UNITS: 100 INJECTION, SOLUTION INTRAVENOUS; SUBCUTANEOUS at 11:57

## 2019-02-10 NOTE — PROGRESS NOTES
Hospitalist Progress Note 2/10/2019 Admit Date: 2019 10:26 PM  
NAME: Joselyn De Luna :  1945 MRN:  [id number] Attending: Sander Chan MD 
PCP:  Dee Gauthier MD 
 
SUBJECTIVE:  
75 yr old male pt with known h/o cad, copd, htn,dm type 2, pad and hyperlipidemia. 
  
Wife says pt had been having cough and chest congestion going on for past 3 weeks. Today when she came home- noticed pt was being restless/shking- thought that it could be allergic reaction from the 2 mucinex pills he took today(also took 3 beers). Wife gave him benadryl- didn't help- noticed he was shaking more- called ems- was brought er for further evaluation. 
  
Pt mild drowsy, easily arousable,c/o mild sob- otherwise no headache or dizziness or chest pain or nausea or vomiting or abd pain. 
  
Wbc 14.6,glucose 227,flu negative. Temp 100.6, heart rate 106. cxr- atelectasis vs consolidation Oxygen saturation 91% on 2 li/min 
  
Pt will be admitted for hypoxic resp failure, copd exa, pna- meets criteria for sepsis 
  
 
Interval History (2/10): patient examined at bedside. Breathing is doing better overall. No chest pain or abdominal pain. No fevers/chills. No nausea/vomiting or diarrhea. Review of Systems negative with exception of pertinent positives noted above PHYSICAL EXAM  
 
Visit Vitals /79 (BP 1 Location: Left arm, BP Patient Position: Sitting) Pulse 81 Temp 98.3 °F (36.8 °C) Resp 20 Ht 6' (1.829 m) Wt 131.5 kg (290 lb) SpO2 92% BMI 39.33 kg/m² Temp (24hrs), Av.1 °F (36.7 °C), Min:97.9 °F (36.6 °C), Max:98.3 °F (36.8 °C) Oxygen Therapy O2 Sat (%): 92 % (02/10/19 1458) Pulse via Oximetry: 73 beats per minute (19) O2 Device: Room air (19) O2 Flow Rate (L/min): 0 l/min (19 1511) Intake/Output Summary (Last 24 hours) at 2/10/2019 1515 Last data filed at 2/10/2019 1315 Gross per 24 hour Intake 4106 ml Output 2600 ml Net 1506 ml  
  
 General:          Well nourished. Alert. Very pleasant Eyes:               Normal sclera. Extraocular movements intact. ENT:                Normocephalic, atraumatic. Moist mucous membranes CV:                  RRR. No murmur, rub, or gallop. Lungs:             Expiratory wheezes but interval improvement in airflow, no signs of respiratory distress Abdomen:        Soft, nontender, nondistended. Bowel sounds normal.obese Extremities:     Warm and dry. No cyanosis or edema. Neurologic:      CN II-XII grossly intact. Sensation intact. Skin:                No rashes or jaundice. Psych:             Normal mood and affect. ASSESSMENT Active Hospital Problems Diagnosis Date Noted  Hypoxia 02/08/2019  Sepsis ([facility] [address] 02/08/2019  PNA (pneumonia) 02/08/2019  Pulmonary emphysema ([facility] [address] 11/17/2016  PAD (peripheral artery disease) ([facility] [address] 11/10/2015  Diabetic neuropathy  11/10/2015  COPD (chronic obstructive pulmonary disease) ([facility] [address] 04/10/2015  DM (diabetes mellitus) type II, controlled, with peripheral vascular disorder ([facility] [address] 09/11/2014  Ischemic cardiomyopathy 10/12/2011  Coronary atherosclerosis of native coronary artery 10/12/2011  
 HTN (hypertension) 10/12/2011  Sleep apnea 10/12/2011  Morbid obesity ([facility] [address] 10/12/2011 Plan: # Sepsis secondary to CAP in the setting of known COPD 
- continue with ceftriaxone and azithromycin - DuoNebs q4h 
- discontinue steroids 
- goal SpO2 of 88-92% in the setting of COPD 
- blood cultures collected with NGTD # DM, currently uncontrolled due to corticosteroid use/sepsis 
- discontinue all oral diabetes medications 
- increase lantus from 20 to 25 units 
- continue Humalog 8 units TIDAC 
- Humalog SSI and serial CBGs # AGUSTIN, resolving now with rehydration 
- continue home meds but hold valsartan and HCTZ 
- maintenance fluids 
- avoid nephrotoxic meds 
- serial BMPs while inpatient # HTN 
 - continue with amlodipine # PVD 
- continue with statin and ASA and Plavix 
- continue with cilostazol F/E/N: maintenance fluids, replete electrolytes as needed, diabetic diet Ppx: heparin SQ for VTE Code Status: FULL CODE Disposition: pending clinical improvement with plan as above, anticipated discharge home tomorrow. PT/OT consults. All questions answered. Signed By: Tigist Licona DO February 10, 2019

## 2019-02-10 NOTE — PROGRESS NOTES
Oxygen Qualifier Room air: SpO2 with O2 and liter flow Resting SpO2  93% Ambulating SpO2  89 - 91% Pt tolerated ambulating on room air well, O2 sat stayed 89-91% while walking around all of 6th floor.  
 
Completed by: 
 
Prudence Phillips, RT

## 2019-02-10 NOTE — PROGRESS NOTES
Pt appears comfortable. No new complaints. Hourly rounds completed this shift. Report given to oncoming nurse.

## 2019-02-10 NOTE — PROGRESS NOTES
Hospitalist Progress Note   
2019 Admit Date: 2019 10:26 PM  
NAME: Grace Mancera :  1945 MRN:  [id number] Attending: Robbi Lam MD 
PCP:  Yvette Guerrero MD 
 
SUBJECTIVE:  
75 yr old male pt with known h/o cad, copd, htn,dm type 2, pad and hyperlipidemia. 
  
Wife says pt had been having cough and chest congestion going on for past 3 weeks. Today when she came home- noticed pt was being restless/shking- thought that it could be allergic reaction from the 2 mucinex pills he took today(also took 3 beers). Wife gave him benadryl- didn't help- noticed he was shaking more- called ems- was brought er for further evaluation. 
  
Pt mild drowsy, easily arousable,c/o mild sob- otherwise no headache or dizziness or chest pain or nausea or vomiting or abd pain. 
  
Wbc 14.6,glucose 227,flu negative. Temp 100.6, heart rate 106. cxr- atelectasis vs consolidation Oxygen saturation 91% on 2 li/min 
  
Pt will be admitted for hypoxic resp failure, copd exa, pna- meets criteria for sepsis 
  
 
Interval History (): patient examined at bedside. Patient with uncontrolled blood sugars this morning, he says he feels ok though. No chest pain or abdominal pain. He was given SSI and 1.5 liter bolus of fluids. Shortness of breath seems to be improving overall . No nausea/vomiting or diarrhea. Review of Systems negative with exception of pertinent positives noted above PHYSICAL EXAM  
 
Visit Vitals /66 (BP 1 Location: Left arm, BP Patient Position: Sitting) Pulse 69 Temp 97.6 °F (36.4 °C) Resp 20 Ht 6' (1.829 m) Wt 131.5 kg (290 lb) SpO2 95% BMI 39.33 kg/m² Temp (24hrs), Av.5 °F (36.9 °C), Min:97.6 °F (36.4 °C), Max:100.6 °F (38.1 °C) Oxygen Therapy O2 Sat (%): 95 % (19) Pulse via Oximetry: 88 beats per minute (19) O2 Device: Room air (19) O2 Flow Rate (L/min): 0 l/min (19) Intake/Output Summary (Last 24 hours) at 2/9/2019 6980 Last data filed at 2/9/2019 3932 Gross per 24 hour Intake 840 ml Output 1350 ml Net -510 ml General:          Well nourished. Alert. Very pleasant Eyes:               Normal sclera. Extraocular movements intact. ENT:                Normocephalic, atraumatic. Moist mucous membranes CV:                  RRR. No murmur, rub, or gallop. Lungs:             Bilaterally wheezing, coarse breath sounds but not obviously tachypneic or dyspneic Abdomen:        Soft, nontender, nondistended. Bowel sounds normal.obese Extremities:     Warm and dry. No cyanosis or edema. Neurologic:      CN II-XII grossly intact. Sensation intact. Skin:                No rashes or jaundice. Psych:             Normal mood and affect. ASSESSMENT Active Hospital Problems Diagnosis Date Noted  Hypoxia 02/08/2019  Sepsis ([address] 02/08/2019  PNA (pneumonia) 02/08/2019  Pulmonary emphysema ([address] 11/17/2016  PAD (peripheral artery disease) ([facility] [address] 11/10/2015  Diabetic neuropathy  11/10/2015  COPD (chronic obstructive pulmonary disease) ([facility] [address] 04/10/2015  DM (diabetes mellitus) type II, controlled, with peripheral vascular disorder ([address] 09/11/2014  Ischemic cardiomyopathy 10/12/2011  Coronary atherosclerosis of native coronary artery 10/12/2011  
 HTN (hypertension) 10/12/2011  Sleep apnea 10/12/2011  Morbid obesity ([address] 10/12/2011 Plan: # Sepsis secondary to CAP in the setting of known COPD 
- continue with ceftriaxone and azithromycin - DuoNebs q4h 
- switch from Solumedrol to prednisone 40 mg, will likely taper over the next several days 
- goal SpO2 of 88-92% in the setting of COPD 
- blood cultures collected with NGTD # DM, currently uncontrolled due to corticosteroid use/sepsis 
- discontinue all oral diabetes medications 
- start lantus 20 units - start Humalog 8 units TIDAC 
 - Humalog SSI and serial CBGs # AGUSTIN, rise in SCr from 1.17 to 1.77 
- continue home meds but hold valsartan and HCTZ 
- maintenance fluids 
- avoid nephrotoxic meds 
- serial BMPs while inpatient # HTN 
- continue with amlodipine # PVD 
- continue with statin and ASA and Plavix 
- continue with cilostazol F/E/N: maintenance fluids, replete electrolytes as needed, diabetic diet Ppx: heparin SQ for VTE Code Status: FULL CODE Disposition: pending clinical improvement with plan as above. PPD ordered. PT/OT consults. All questions answered. Signed By: Mark Ren,  February 9, 2019

## 2019-02-11 VITALS
BODY MASS INDEX: 39.28 KG/M2 | WEIGHT: 290 LBS | OXYGEN SATURATION: 92 % | HEART RATE: 91 BPM | TEMPERATURE: 98.5 F | DIASTOLIC BLOOD PRESSURE: 64 MMHG | SYSTOLIC BLOOD PRESSURE: 134 MMHG | RESPIRATION RATE: 20 BRPM | HEIGHT: 72 IN

## 2019-02-11 LAB
GLUCOSE BLD STRIP.AUTO-MCNC: 183 MG/DL (ref 65–100)
GLUCOSE BLD STRIP.AUTO-MCNC: 217 MG/DL (ref 65–100)

## 2019-02-11 PROCEDURE — 94760 N-INVAS EAR/PLS OXIMETRY 1: CPT

## 2019-02-11 PROCEDURE — 74011000250 HC RX REV CODE- 250: Performed by: FAMILY MEDICINE

## 2019-02-11 PROCEDURE — 74011250636 HC RX REV CODE- 250/636: Performed by: FAMILY MEDICINE

## 2019-02-11 PROCEDURE — 74011250637 HC RX REV CODE- 250/637: Performed by: FAMILY MEDICINE

## 2019-02-11 PROCEDURE — 74011636637 HC RX REV CODE- 636/637: Performed by: INTERNAL MEDICINE

## 2019-02-11 PROCEDURE — 77010033678 HC OXYGEN DAILY

## 2019-02-11 PROCEDURE — 94640 AIRWAY INHALATION TREATMENT: CPT

## 2019-02-11 PROCEDURE — 74011000258 HC RX REV CODE- 258: Performed by: FAMILY MEDICINE

## 2019-02-11 PROCEDURE — 82962 GLUCOSE BLOOD TEST: CPT

## 2019-02-11 RX ORDER — AZITHROMYCIN 500 MG/1
500 TABLET, FILM COATED ORAL DAILY
Qty: 3 TAB | Refills: 0 | Status: SHIPPED | OUTPATIENT
Start: 2019-02-11 | End: 2019-02-18

## 2019-02-11 RX ORDER — CEFPODOXIME PROXETIL 200 MG/1
200 TABLET, FILM COATED ORAL 2 TIMES DAILY
Qty: 6 TAB | Refills: 0 | Status: SHIPPED | OUTPATIENT
Start: 2019-02-11 | End: 2019-02-14

## 2019-02-11 RX ADMIN — INSULIN LISPRO 6 UNITS: 100 INJECTION, SOLUTION INTRAVENOUS; SUBCUTANEOUS at 08:06

## 2019-02-11 RX ADMIN — Medication 10 ML: at 05:02

## 2019-02-11 RX ADMIN — LORATADINE 10 MG: 10 TABLET ORAL at 08:04

## 2019-02-11 RX ADMIN — HEPARIN SODIUM 5000 UNITS: 5000 INJECTION INTRAVENOUS; SUBCUTANEOUS at 08:07

## 2019-02-11 RX ADMIN — CILOSTAZOL 50 MG: 50 TABLET ORAL at 05:04

## 2019-02-11 RX ADMIN — CLOPIDOGREL BISULFATE 75 MG: 75 TABLET, FILM COATED ORAL at 08:04

## 2019-02-11 RX ADMIN — BUDESONIDE 500 MCG: 0.5 INHALANT RESPIRATORY (INHALATION) at 08:31

## 2019-02-11 RX ADMIN — INSULIN GLARGINE 25 UNITS: 100 INJECTION, SOLUTION SUBCUTANEOUS at 08:07

## 2019-02-11 RX ADMIN — IPRATROPIUM BROMIDE AND ALBUTEROL SULFATE 3 ML: .5; 3 SOLUTION RESPIRATORY (INHALATION) at 05:09

## 2019-02-11 RX ADMIN — AMLODIPINE BESYLATE 10 MG: 10 TABLET ORAL at 08:04

## 2019-02-11 RX ADMIN — INSULIN LISPRO 8 UNITS: 100 INJECTION, SOLUTION INTRAVENOUS; SUBCUTANEOUS at 08:05

## 2019-02-11 RX ADMIN — CEFTRIAXONE SODIUM 1 G: 1 INJECTION, POWDER, FOR SOLUTION INTRAMUSCULAR; INTRAVENOUS at 05:01

## 2019-02-11 RX ADMIN — AZITHROMYCIN MONOHYDRATE 500 MG: 500 INJECTION, POWDER, LYOPHILIZED, FOR SOLUTION INTRAVENOUS at 05:01

## 2019-02-11 RX ADMIN — IPRATROPIUM BROMIDE AND ALBUTEROL SULFATE 3 ML: .5; 3 SOLUTION RESPIRATORY (INHALATION) at 08:31

## 2019-02-11 NOTE — DISCHARGE INSTRUCTIONS
Patient Education     DISCHARGE SUMMARY from Nurse    PATIENT INSTRUCTIONS:    After general anesthesia or intravenous sedation, for 24 hours or while taking prescription Narcotics:  · Limit your activities  · Do not drive and operate hazardous machinery  · Do not make important personal or business decisions  · Do  not drink alcoholic beverages  · If you have not urinated within 8 hours after discharge, please contact your surgeon on call. Report the following to your surgeon:  · Excessive pain, swelling, redness or odor of or around the surgical area  · Temperature over 100.5  · Nausea and vomiting lasting longer than 4 hours or if unable to take medications  · Any signs of decreased circulation or nerve impairment to extremity: change in color, persistent  numbness, tingling, coldness or increase pain  · Any questions    What to do at Home:  Recommended activity: Activity as tolerated, please complete full course of antibiotics    If you experience any of the following symptoms fever, new or unrelieved pain, persistent nausea or vomiting, shortness of breath not relieved by rest or any other worrisome symptoms, please follow up with PCP. *  Please give a list of your current medications to your Primary Care Provider. *  Please update this list whenever your medications are discontinued, doses are      changed, or new medications (including over-the-counter products) are added. *  Please carry medication information at all times in case of emergency situations. These are general instructions for a healthy lifestyle:    No smoking/ No tobacco products/ Avoid exposure to second hand smoke  Surgeon General's Warning:  Quitting smoking now greatly reduces serious risk to your health.     Obesity, smoking, and sedentary lifestyle greatly increases your risk for illness    A healthy diet, regular physical exercise & weight monitoring are important for maintaining a healthy lifestyle    You may be retaining fluid if you have a history of heart failure or if you experience any of the following symptoms:  Weight gain of 3 pounds or more overnight or 5 pounds in a week, increased swelling in our hands or feet or shortness of breath while lying flat in bed. Please call your doctor as soon as you notice any of these symptoms; do not wait until your next office visit. Recognize signs and symptoms of STROKE:    F-face looks uneven    A-arms unable to move or move unevenly    S-speech slurred or non-existent    T-time-call 911 as soon as signs and symptoms begin-DO NOT go       Back to bed or wait to see if you get better-TIME IS BRAIN. Warning Signs of HEART ATTACK     Call 911 if you have these symptoms:   Chest discomfort. Most heart attacks involve discomfort in the center of the chest that lasts more than a few minutes, or that goes away and comes back. It can feel like uncomfortable pressure, squeezing, fullness, or pain.  Discomfort in other areas of the upper body. Symptoms can include pain or discomfort in one or both arms, the back, neck, jaw, or stomach.  Shortness of breath with or without chest discomfort.  Other signs may include breaking out in a cold sweat, nausea, or lightheadedness. Don't wait more than five minutes to call 911 - MINUTES MATTER! Fast action can save your life. Calling 911 is almost always the fastest way to get lifesaving treatment. Emergency Medical Services staff can begin treatment when they arrive -- up to an hour sooner than if someone gets to the hospital by car. The discharge information has been reviewed with the patient. The patient verbalized understanding. Discharge medications reviewed with the patient and appropriate educational materials and side effects teaching were provided.   ___________________________________________________________________________________________________________________________________     Pneumonia: Care Instructions  Your Care Instructions    Pneumonia is an infection of the lungs. Most cases are caused by infections from bacteria or viruses. Pneumonia may be mild or very severe. If it is caused by bacteria, you will be treated with antibiotics. It may take a few weeks to a few months to recover fully from pneumonia, depending on how sick you were and whether your overall health is good. Follow-up care is a key part of your treatment and safety. Be sure to make and go to all appointments, and call your doctor if you are having problems. It's also a good idea to know your test results and keep a list of the medicines you take. How can you care for yourself at home? · Take your antibiotics exactly as directed. Do not stop taking the medicine just because you are feeling better. You need to take the full course of antibiotics. · Take your medicines exactly as prescribed. Call your doctor if you think you are having a problem with your medicine. · Get plenty of rest and sleep. You may feel weak and tired for a while, but your energy level will improve with time. · To prevent dehydration, drink plenty of fluids, enough so that your urine is light yellow or clear like water. Choose water and other caffeine-free clear liquids until you feel better. If you have kidney, heart, or liver disease and have to limit fluids, talk with your doctor before you increase the amount of fluids you drink. · Take care of your cough so you can rest. A cough that brings up mucus from your lungs is common with pneumonia. It is one way your body gets rid of the infection. But if coughing keeps you from resting or causes severe fatigue and chest-wall pain, talk to your doctor. He or she may suggest that you take a medicine to reduce the cough. · Use a vaporizer or humidifier to add moisture to your bedroom. Follow the directions for cleaning the machine. · Do not smoke or allow others to smoke around you. Smoke will make your cough last longer.  If you need help quitting, talk to your doctor about stop-smoking programs and medicines. These can increase your chances of quitting for good. · Take an over-the-counter pain medicine, such as acetaminophen (Tylenol), ibuprofen (Advil, Motrin), or naproxen (Aleve). Read and follow all instructions on the label. · Do not take two or more pain medicines at the same time unless the doctor told you to. Many pain medicines have acetaminophen, which is Tylenol. Too much acetaminophen (Tylenol) can be harmful. · If you were given a spirometer to measure how well your lungs are working, use it as instructed. This can help your doctor tell how your recovery is going. · To prevent pneumonia in the future, talk to your doctor about getting a flu vaccine (once a year) and a pneumococcal vaccine (one time only for most people). When should you call for help? Call 911 anytime you think you may need emergency care. For example, call if:    · You have severe trouble breathing.    Call your doctor now or seek immediate medical care if:    · You cough up dark brown or bloody mucus (sputum).     · You have new or worse trouble breathing.     · You are dizzy or lightheaded, or you feel like you may faint.    Watch closely for changes in your health, and be sure to contact your doctor if:    · You have a new or higher fever.     · You are coughing more deeply or more often.     · You are not getting better after 2 days (48 hours).     · You do not get better as expected. Where can you learn more? Go to http://mandeep-ira.info/. Enter [id number] in the search box to learn more about \"Pneumonia: Care Instructions. \"  Current as of: September 5, 2018  Content Version: 11.9  © 1717-5961 [employer]. Care instructions adapted under license by [employer] (which disclaims liability or warranty for this information).  If you have questions about a medical condition or this instruction, always ask your healthcare professional. [address] any warranty or liability for your use of this information.

## 2019-02-11 NOTE — DISCHARGE SUMMARY
Hospitalist Discharge Summary     Patient ID:  Jennifer Whitaker  [id number]  75 y.o.  1945  Admit date: 2/8/2019 10:26 PM  Discharge date and time: 2/11/2019  Attending: No att. providers found  PCP:  Letty Cole MD  Treatment Team: Utilization Review: Christin Chavez RN    Principal Diagnosis Sepsis [facility])   Principal Problem:    Sepsis ([facility] [address] (2/8/2019)    Active Problems:    Ischemic cardiomyopathy (10/12/2011)      Coronary atherosclerosis of native coronary artery (10/12/2011)      HTN (hypertension) (10/12/2011)      Sleep apnea (10/12/2011)      Morbid obesity ([address] (10/12/2011)      DM (diabetes mellitus) type II, controlled, with peripheral vascular disorder ([address] (9/11/2014)      COPD (chronic obstructive pulmonary disease) ([address] (4/10/2015)      PAD (peripheral artery disease) ([facility] [address] (11/10/2015)      Diabetic neuropathy  (11/10/2015)      Pulmonary emphysema ([address] (11/17/2016)      Hypoxia (2/8/2019)      PNA (pneumonia) (2/8/2019)       75 yr old male pt with known h/o cad, copd, htn,dm type 2, pad and hyperlipidemia.     Wife says pt had been having cough and chest congestion going on for past 3 weeks. Today when she came home- noticed pt was being restless/shking- thought that it could be allergic reaction from the 2 mucinex pills he took today(also took 3 beers). Wife gave him benadryl- didn't help- noticed he was shaking more- called ems- was brought er for further evaluation.     Pt mild drowsy, easily arousable,c/o mild sob- otherwise no headache or dizziness or chest pain or nausea or vomiting or abd pain.     Wbc 14.6,glucose 227,flu negative. Temp 100.6, heart rate 106. cxr- atelectasis vs consolidation  Oxygen saturation 91% on 2 li/min     Pt will be admitted for hypoxic resp failure, copd exa, pna- meets criteria for sepsis    Interval History (2/11): patient examined at bedside. Little short of breath after he first woke up but feeling better after he got up.  Not satisfied with breakfast but says that he feels well overall and wants to go home. No chest pain, fevers/chills, changes in baseline shortness of breath, abdominal pain. Hospital Course:  Please refer to the admission H&P for details of presentation. In summary, the patient is admitted for CAP in the context of known COPD with underlying exacerbation. He was treated empirically with ceftriaxone and azithromycin with corticosteroids and jet nebulizers. His hospitalization was complicated by hyperlycemia secondary to corticosteroids. Due to symptomatic improvement in his respiratory status he was quickly weaned off corticosteroids. Also with AGUSTIN that responded promptly to IVFs. His home diabetes medications were held and he was switched to SQ insulin regimen based on weight with improvement in his CBGs. On day of discharge, patient converted back to his home regimen. He will be discharged with three more days of empiric cefpodoxime and azithromycin. Oxygen qualifier completed, he is not a candidate for supplemental oxygen both at rest and on ambulation. Return precautions provided. He understands the above details and agrees with hospital discharge. He is to follow-up with his PCP within 1 week. All questions answered. Significant Diagnostic Studies:     AP LATERAL CHEST  2/8/2019 9:52 PM      HISTORY:  cough congestion     COMPARISON: 11/10/2014     FINDINGS: Exposure technique is suboptimal. There is atelectasis or  consolidation in the left lower lobe. There are no large pleural collections. Pulmonary vascularity is grossly within normal limits.     IMPRESSION  IMPRESSION: Retrocardiac atelectasis or consolidation.     Labs: Results:       Chemistry Recent Labs     02/10/19  0457 02/09/19  0823 02/08/19  2105   * 515* 227*    134* 136   K 4.0 4.9 3.8    98 101   CO2 27 26 21   BUN 18 20 12   CREA 1.14 1.77* 1.17   CA 8.5 8.6 8.2*   AGAP 6* 10 14   AP  --   --  55   TP  --   --  6.9   ALB --   --  3.7   GLOB  --   --  3.2   AGRAT  --   --  1.2      CBC w/Diff Recent Labs     02/10/19  0457 02/09/19  0823 02/08/19 2105   WBC 16.7* 20.0* 14.6*   RBC 3.39* 3.48* 3.56*   HGB 10.7* 11.1* 11.3*   HCT 33.4* 34.0* 34.2*    188 172   GRANS 83* 89* 85*   LYMPH 8* 3* 5*   EOS 0* 0* 0*      Cardiac Enzymes No results for input(s): CPK, CKND1, NIKA in the last 72 hours. No lab exists for component: CKRMB, TROIP   Coagulation No results for input(s): PTP, INR, APTT in the last 72 hours. No lab exists for component: INREXT    Lipid Panel Lab Results   Component Value Date/Time    Cholesterol, total 97 (L) 06/14/2018 11:34 AM    HDL Cholesterol 42 06/14/2018 11:34 AM    LDL, calculated 33 06/14/2018 11:34 AM    VLDL, calculated 22 06/14/2018 11:34 AM    Triglyceride 110 06/14/2018 11:34 AM    CHOL/HDL Ratio 2.0 10/13/2011 04:08 AM      BNP No results for input(s): BNPP in the last 72 hours. Liver Enzymes Recent Labs     02/08/19 2105   TP 6.9   ALB 3.7   AP 55   SGOT 14*      Thyroid Studies Lab Results   Component Value Date/Time    T4, Total 10.1 11/10/2014 07:30 PM            Discharge Exam:  Visit Vitals  /64   Pulse 91   Temp 98.5 °F (36.9 °C)   Resp 20   Ht 6' (1.829 m)   Wt 131.5 kg (290 lb)   SpO2 92%   BMI 39.33 kg/m²       General:          Well nourished.  Alert. Very pleasant  Eyes:               Normal sclera.  Extraocular movements intact. ENT:                Normocephalic, atraumatic.  Moist mucous membranes  CV:                  RRR.  No murmur, rub, or gallop.    Lungs:             Expiratory wheezes but interval improvement in airflow, no signs of respiratory distress   Abdomen:        Soft, nontender, nondistended. Bowel sounds normal.obese   Extremities:     Warm and dry.  No cyanosis or edema. Neurologic:      CN II-XII grossly intact.  Sensation intact. Skin:                No rashes or jaundice.    Psych:             Normal mood and affect.     Disposition: home  Discharge Condition: stable  Patient Instructions:   Discharge Medication List as of 2/11/2019  8:58 AM      START taking these medications    Details   azithromycin (ZITHROMAX) 500 mg tab Take 1 Tab by mouth daily for 30 days. , Print, Disp-3 Tab, R-0      cefpodoxime (VANTIN) 200 mg tablet Take 1 Tab by mouth two (2) times a day for 3 days. , Print, Disp-6 Tab, R-0         CONTINUE these medications which have NOT CHANGED    Details   glucose blood VI test strips (ASCENSIA AUTODISC VI, ONE TOUCH ULTRA TEST VI) strip E11.9, Normal, Disp-100 Strip, R-11      clopidogrel (PLAVIX) 75 mg tab TAKE ONE TABLET BY MOUTH EVERY DAY, NormalGeneric For:*PLAVIX 75MGDisp-90 Tab, R-4      glucosamine/chondr funez A sod (GLUCOSAMINE-CHONDROITIN) 750-600 mg tab Take  by mouth two (2) times a day., Historical Med      cholecalciferol, vitamin D3, (VITAMIN D3) 2,000 unit tab Take 2,000 Units by mouth nightly., Historical Med      montelukast (SINGULAIR) 10 mg tablet 1 po q hs, Normal, Disp-90 Tab, R-3      ADVAIR DISKUS 250-50 mcg/dose diskus inhaler INHALE 1 PUFF 2 TIMES A DAY, RINSE MOUTH AFTER USE, Normal, Disp-1 Inhaler, R-11      fluticasone (FLONASE) 50 mcg/actuation nasal spray USE 1 OR 2 SPRAYS IN EACH NOSTRIL EVERY DAY, Normal, Disp-48 g, R-3      cilostazol (PLETAL) 50 mg tablet TAKE 1 TABLET BY MOUTH TWICE DAILY., Normal, Disp-180 Tab, R-3      amLODIPine-Valsartan-HCTZ -25 mg tab TAKE 1/2 TO 1 TABLET BY MOUTH EVERY DAY, Normal, Disp-90 Tab, R-3      JANUVIA 100 mg tablet TAKE ONE TABLET BY MOUTH EVERY DAY, Normal, Disp-90 Tab, R-3      rosuvastatin (CRESTOR) 10 mg tablet TAKE ONE TABLET BY MOUTH ONCE A DAY, Normal, Disp-90 Tab, R-3      glipiZIDE SR (GLUCOTROL XL) 10 mg CR tablet TAKE ONE TABLET BY MOUTH 2 TIMES A DAY, Normal, Disp-180 Tab, R-3      metFORMIN (GLUCOPHAGE) 500 mg tablet TAKE TWO TABLETS BY MOUTH 2 TIMES A DAY, Normal, Disp-360 Tab, R-3      diclofenac (VOLTAREN) 1 % gel Apply 4 g to affected area four (4) times daily as needed., Normal, Disp-5 Each, R-1      albuterol (PROAIR HFA) 90 mcg/actuation inhaler USE 2 PUFFS FOUR TIMES DAILY AS NEEDED., Normal, Disp-3 Inhaler, R-3      FISH OIL CONCENTRATE 1,000 mg cap Take 1-2 Caps by mouth two (2) times a day. One tab in am & 2 tabs in pm, Historical Med, YOKO      meclizine (ANTIVERT) 25 mg tablet Take 25 mg by mouth three (3) times daily as needed., Historical Med      nitroglycerin (NITROSTAT) 0.4 mg SL tablet 0.4 mg by SubLINGual route every five (5) minutes as needed for Chest Pain., Historical Med      cetirizine (ZYRTEC) 10 mg tablet Take  by mouth as needed for Allergies. , Historical Med      garlic 5,772 mg cap Take 1 Tab by mouth two (2) times a day., Historical Med      MULTIVITS-MINERALS/FA/LYCOPENE (ONE-A-DAY MEN'S MULTIVITAMIN PO) Take 1 Tab by mouth daily. , Historical Med      cpap machine kit Take  by inhalation. qhs, Historical Med      aspirin 81 mg tablet Take 81 mg by mouth nightly., Historical Med             Activity: Activity as tolerated  Diet: Diabetic Diet  Wound Care: None needed    Follow-up  ·   With PCP within 1 week  Time spent to discharge patient 35 minutes  Signed:   Chito Pringle DO  2/11/2019  3:01 PM

## 2019-02-11 NOTE — PROGRESS NOTES
Rounding done every hour. Patient resting in room. No signs or symptoms of distress. No changes in status. Patient denies any further needs or pain at this time.

## 2019-02-11 NOTE — PROGRESS NOTES
Discharge instructions and prescriptions provided and explained to patient, patient voiced understanding. Medication side effect sheet reviewed with pt. No home meds or valuables to return. Opportunity for questions provided. Pt has called his wife and waiting on her to get here. Instructed to call once ready to leave the floor.

## 2019-02-12 ENCOUNTER — PATIENT OUTREACH (OUTPATIENT)
Dept: CASE MANAGEMENT | Age: 74
End: 2019-02-12

## 2019-02-12 NOTE — PROGRESS NOTES
This note will not be viewable in [address] Date/Time of Call: 
 02/12/19 
 254pm  
What was the patient hospitalized for? Sepsis Consent for GITA GONZALEZ Call Does the patient understand his/her diagnosis and/or treatment and what happened during the hospitalization? Patient agrees to call Yes patient understands hospitalization Did the patient receive discharge instructions? Yes   
CM Assessed Risk for Readmission:  
 
 
Patient stated Risk for Readmission:  Patient is a moderate risk for readmission; scheduled admit Patient is not concerned for readmission Review any discharge instructions ([facility] Ask patient if they understand these. Do they have any questions? Reviewed DC instructions Were home services ordered (nursing, PT, OT, ST, etc.)? No  
If so, has the first visit occurred? If not, why? (Assist with coordination of services if necessary. ) 
 NA Was any DME ordered? No  
If so, has it been received? If not, why?  (Assist patient in obtaining DME orders &/or equipment if necessary. ) NA Complete a review of all medications (new, continued and discontinued meds per the D/C instructions and medication tab in [facility]). Medications reviewed Were all new prescriptions filled? If not, why?  (Assist patient in obtaining medications if necessary  escalate for CCM &/or SW if ongoing issues are verbalized by pt or anticipated) Yes Does the patient understand the purpose and dosing instructions for all medications? (If patient has questions, provide explanation and education.) Patient states that he understands his medications Does the patient have any problems in performing ADLs? (If patient is unable to perform ADLs  what is the limiting factor(s)? Do they have a support system that can assist? If no support system is present, discuss possible assistance that they may be able to obtain.  Escalate for CCM/SW if ongoing issues are verbalized by pt or anticipated) Patient is independent with ADLs and spouse assists PRN Does the patient have all follow-up appointments scheduled? 7 day f/up with PCP?  
(f/up with PCP may be w/in 14 days if patient has a f/up with their specialist w/in 7 days) 7-14 day f/up with specialist?  
(or per discharge instructions) If f/up has not been made  what actions has the care coordinator made to accomplish this? Has transportation been arranged? Yes  
 
 
02/18/19 [id number] 
 
 
 
Pulmonology 02/20/19 at 1240pm 
 
 
Reviewed appointment information with patient Patient drives self Any other questions or concerns expressed by the patient? No questions or concerns are voiced at this time. Care Coordinator contact information provided should any needs arise Schedule next appointment with GITA Champion or refer to RN Case Manager/ per the workflow guidelines. When is care coordinators next follow-up call scheduled? If referred for CCM  what RN care manager was the referral assigned? Within 30 days Within 30 days NA  
PREM Call Completed By: Kiley Chou CMA Care Coordinator

## 2019-02-12 NOTE — PROGRESS NOTES
This note will not be viewable in [address] 1st Attempt to contact patient for PREM call, no answer, left  for returned call. Will attempt to contact patient again within 24 hours

## 2019-02-14 LAB
BACTERIA SPEC CULT: NORMAL
BACTERIA SPEC CULT: NORMAL
SERVICE CMNT-IMP: NORMAL
SERVICE CMNT-IMP: NORMAL

## 2019-02-18 PROBLEM — A41.9 SEPSIS (HCC): Status: RESOLVED | Noted: 2019-02-08 | Resolved: 2019-02-18

## 2019-02-18 PROBLEM — J18.9 PNA (PNEUMONIA): Status: RESOLVED | Noted: 2019-02-08 | Resolved: 2019-02-18

## 2019-03-06 ENCOUNTER — PATIENT OUTREACH (OUTPATIENT)
Dept: CASE MANAGEMENT | Age: 74
End: 2019-03-06

## 2019-03-06 NOTE — PROGRESS NOTES
This note will not be viewable in [address] Transitions of Care  Follow up Outreach Note   Outreach type Phone call: spoke with patients daughter Ephraim Holloway   Date/Time of Outreach: 03/06/19 221pm     Has patient attended PCP or specialist follow-up appointments since last contact? What was outcome of appointment? When is next follow-up scheduled? Patient has completed FUs with PCP and Pulmonologist. FU scheduled with Cardiology    Review medications. Any medication changes since last outreach? Does patient have any questions or issues related to their medications? Patient has medications. No significant changes, questions or concerns. Home health active? If yes  any issue? Progress? NA     Referrals needed?  (CM, SW, HH, etc.)   NA   Other issues/Miscellaneous? (Transportation, access to meals, ability to perform ADLs, adequate caregiver support, etc.) No questions or concerns. Patient independent  with ADLs. has transportation to appointments   Next Outreach Scheduled?     Graduation from program?   N/A    Yes     Next Steps/Goals (if applicable):   NA     Outreach completed by:   Wilton Stuart Coordinator

## 2019-04-09 ENCOUNTER — HOSPITAL ENCOUNTER (OUTPATIENT)
Dept: GENERAL RADIOLOGY | Age: 74
Discharge: HOME | End: 2019-04-09
Payer: MEDICARE

## 2019-04-09 DIAGNOSIS — J44.9 CHRONIC OBSTRUCTIVE PULMONARY DISEASE, UNSPECIFIED COPD TYPE (HCC): ICD-10-CM

## 2019-04-09 PROCEDURE — 71046 X-RAY EXAM CHEST 2 VIEWS: CPT

## 2019-06-17 PROBLEM — E11.21 TYPE 2 DIABETES WITH NEPHROPATHY (HCC): Status: ACTIVE | Noted: 2019-06-17

## 2019-12-09 PROBLEM — S91.119A TOE LACERATION: Status: ACTIVE | Noted: 2019-12-09

## 2020-12-31 ENCOUNTER — HOSPITAL ENCOUNTER (INPATIENT)
Age: 75
LOS: 26 days | Discharge: NURSING, REHAB OR LONG-TERM CARE | DRG: 177 | End: 2021-01-26
Attending: EMERGENCY MEDICINE | Admitting: INTERNAL MEDICINE
Payer: MEDICARE

## 2020-12-31 ENCOUNTER — APPOINTMENT (OUTPATIENT)
Dept: GENERAL RADIOLOGY | Age: 75
DRG: 177 | End: 2020-12-31
Attending: EMERGENCY MEDICINE
Payer: MEDICARE

## 2020-12-31 DIAGNOSIS — I47.20 V-TACH: Primary | ICD-10-CM

## 2020-12-31 DIAGNOSIS — R19.7 DIARRHEA, UNSPECIFIED TYPE: ICD-10-CM

## 2020-12-31 DIAGNOSIS — J18.9 PNEUMONIA OF RIGHT LOWER LOBE DUE TO INFECTIOUS ORGANISM: ICD-10-CM

## 2020-12-31 DIAGNOSIS — U07.1 COVID-19: ICD-10-CM

## 2020-12-31 DIAGNOSIS — N17.9 AKI (ACUTE KIDNEY INJURY) (HCC): ICD-10-CM

## 2020-12-31 DIAGNOSIS — E83.42 HYPOMAGNESEMIA: ICD-10-CM

## 2020-12-31 DIAGNOSIS — J96.01 ACUTE RESPIRATORY FAILURE WITH HYPOXIA (HCC): ICD-10-CM

## 2020-12-31 DIAGNOSIS — J44.9 CHRONIC OBSTRUCTIVE PULMONARY DISEASE, UNSPECIFIED COPD TYPE (HCC): ICD-10-CM

## 2020-12-31 DIAGNOSIS — E66.01 MORBID OBESITY (HCC): Chronic | ICD-10-CM

## 2020-12-31 DIAGNOSIS — J18.9 COMMUNITY ACQUIRED PNEUMONIA, UNSPECIFIED LATERALITY: ICD-10-CM

## 2020-12-31 DIAGNOSIS — E87.1 HYPONATREMIA: ICD-10-CM

## 2020-12-31 DIAGNOSIS — R93.1 ABNORMAL NUCLEAR CARDIAC IMAGING TEST: ICD-10-CM

## 2020-12-31 PROBLEM — I47.29 MONOMORPHIC VENTRICULAR TACHYCARDIA: Status: ACTIVE | Noted: 2020-12-31

## 2020-12-31 PROBLEM — R77.8 ELEVATED TROPONIN: Status: ACTIVE | Noted: 2020-12-31

## 2020-12-31 LAB
25(OH)D3 SERPL-MCNC: 32.2 NG/ML (ref 30–100)
ALBUMIN SERPL-MCNC: 3.7 G/DL (ref 3.2–4.6)
ALBUMIN/GLOB SERPL: 1.1 {RATIO} (ref 1.2–3.5)
ALP SERPL-CCNC: 75 U/L (ref 50–136)
ALT SERPL-CCNC: 74 U/L (ref 12–65)
ANION GAP SERPL CALC-SCNC: 2 MMOL/L (ref 7–16)
ANION GAP SERPL CALC-SCNC: 6 MMOL/L (ref 7–16)
AST SERPL-CCNC: 61 U/L (ref 15–37)
ATRIAL RATE: 108 BPM
BASOPHILS # BLD: 0 K/UL (ref 0–0.2)
BASOPHILS NFR BLD: 0 % (ref 0–2)
BILIRUB SERPL-MCNC: 0.5 MG/DL (ref 0.2–1.1)
BUN SERPL-MCNC: 23 MG/DL (ref 8–23)
BUN SERPL-MCNC: 23 MG/DL (ref 8–23)
CALCIUM SERPL-MCNC: 8.1 MG/DL (ref 8.3–10.4)
CALCIUM SERPL-MCNC: 8.4 MG/DL (ref 8.3–10.4)
CALCULATED P AXIS, ECG09: 73 DEGREES
CALCULATED R AXIS, ECG10: 105 DEGREES
CALCULATED T AXIS, ECG11: 17 DEGREES
CHLORIDE SERPL-SCNC: 104 MMOL/L (ref 98–107)
CHLORIDE SERPL-SCNC: 105 MMOL/L (ref 98–107)
CO2 SERPL-SCNC: 21 MMOL/L (ref 21–32)
CO2 SERPL-SCNC: 21 MMOL/L (ref 21–32)
COVID-19 RAPID TEST, COVR: DETECTED
CREAT SERPL-MCNC: 1.5 MG/DL (ref 0.8–1.5)
CREAT SERPL-MCNC: 1.81 MG/DL (ref 0.8–1.5)
CRP SERPL-MCNC: 5.9 MG/DL (ref 0–0.9)
D DIMER PPP FEU-MCNC: 0.52 UG/ML(FEU)
DIAGNOSIS, 93000: NORMAL
DIFFERENTIAL METHOD BLD: ABNORMAL
EOSINOPHIL # BLD: 0 K/UL (ref 0–0.8)
EOSINOPHIL NFR BLD: 0 % (ref 0.5–7.8)
ERYTHROCYTE [DISTWIDTH] IN BLOOD BY AUTOMATED COUNT: 14.6 % (ref 11.9–14.6)
FERRITIN SERPL-MCNC: 305 NG/ML (ref 8–388)
GLOBULIN SER CALC-MCNC: 3.5 G/DL (ref 2.3–3.5)
GLUCOSE BLD STRIP.AUTO-MCNC: 157 MG/DL (ref 65–100)
GLUCOSE BLD STRIP.AUTO-MCNC: 157 MG/DL (ref 65–100)
GLUCOSE BLD STRIP.AUTO-MCNC: 178 MG/DL (ref 65–100)
GLUCOSE BLD STRIP.AUTO-MCNC: 212 MG/DL (ref 65–100)
GLUCOSE SERPL-MCNC: 163 MG/DL (ref 65–100)
GLUCOSE SERPL-MCNC: 218 MG/DL (ref 65–100)
HCT VFR BLD AUTO: 35.3 % (ref 41.1–50.3)
HGB BLD-MCNC: 11.6 G/DL (ref 13.6–17.2)
IMM GRANULOCYTES # BLD AUTO: 0.1 K/UL (ref 0–0.5)
IMM GRANULOCYTES NFR BLD AUTO: 1 % (ref 0–5)
INR PPP: 1.1
LACTATE SERPL-SCNC: 1.9 MMOL/L (ref 0.4–2)
LYMPHOCYTES # BLD: 0.4 K/UL (ref 0.5–4.6)
LYMPHOCYTES NFR BLD: 5 % (ref 13–44)
MAGNESIUM SERPL-MCNC: 1.7 MG/DL (ref 1.8–2.4)
MAGNESIUM SERPL-MCNC: 1.9 MG/DL (ref 1.8–2.4)
MCH RBC QN AUTO: 32.8 PG (ref 26.1–32.9)
MCHC RBC AUTO-ENTMCNC: 32.9 G/DL (ref 31.4–35)
MCV RBC AUTO: 99.7 FL (ref 79.6–97.8)
MONOCYTES # BLD: 0.7 K/UL (ref 0.1–1.3)
MONOCYTES NFR BLD: 9 % (ref 4–12)
NEUTS SEG # BLD: 6.6 K/UL (ref 1.7–8.2)
NEUTS SEG NFR BLD: 85 % (ref 43–78)
NRBC # BLD: 0 K/UL (ref 0–0.2)
P-R INTERVAL, ECG05: 212 MS
PLATELET # BLD AUTO: 111 K/UL (ref 150–450)
PMV BLD AUTO: 11.7 FL (ref 9.4–12.3)
POTASSIUM SERPL-SCNC: 4.1 MMOL/L (ref 3.5–5.1)
POTASSIUM SERPL-SCNC: 4.5 MMOL/L (ref 3.5–5.1)
PROCALCITONIN SERPL-MCNC: 0.11 NG/ML
PROT SERPL-MCNC: 7.2 G/DL (ref 6.3–8.2)
PROTHROMBIN TIME: 14.7 SEC (ref 12.5–14.7)
Q-T INTERVAL, ECG07: 324 MS
QRS DURATION, ECG06: 102 MS
QTC CALCULATION (BEZET), ECG08: 434 MS
RBC # BLD AUTO: 3.54 M/UL (ref 4.23–5.6)
SODIUM SERPL-SCNC: 127 MMOL/L (ref 136–145)
SODIUM SERPL-SCNC: 132 MMOL/L (ref 136–145)
SOURCE, COVRS: ABNORMAL
TROPONIN-HIGH SENSITIVITY: 391.3 PG/ML (ref 0–14)
TROPONIN-HIGH SENSITIVITY: 425.8 PG/ML (ref 0–14)
TROPONIN-HIGH SENSITIVITY: 72.9 PG/ML (ref 0–14)
VENTRICULAR RATE, ECG03: 108 BPM
WBC # BLD AUTO: 7.8 K/UL (ref 4.3–11.1)

## 2020-12-31 PROCEDURE — 87205 SMEAR GRAM STAIN: CPT

## 2020-12-31 PROCEDURE — 86140 C-REACTIVE PROTEIN: CPT

## 2020-12-31 PROCEDURE — 87635 SARS-COV-2 COVID-19 AMP PRB: CPT

## 2020-12-31 PROCEDURE — 74011000258 HC RX REV CODE- 258: Performed by: INTERNAL MEDICINE

## 2020-12-31 PROCEDURE — 74011000258 HC RX REV CODE- 258: Performed by: EMERGENCY MEDICINE

## 2020-12-31 PROCEDURE — 71045 X-RAY EXAM CHEST 1 VIEW: CPT

## 2020-12-31 PROCEDURE — 99284 EMERGENCY DEPT VISIT MOD MDM: CPT

## 2020-12-31 PROCEDURE — 83735 ASSAY OF MAGNESIUM: CPT

## 2020-12-31 PROCEDURE — 85610 PROTHROMBIN TIME: CPT

## 2020-12-31 PROCEDURE — 84484 ASSAY OF TROPONIN QUANT: CPT

## 2020-12-31 PROCEDURE — 82728 ASSAY OF FERRITIN: CPT

## 2020-12-31 PROCEDURE — 74011636637 HC RX REV CODE- 636/637: Performed by: INTERNAL MEDICINE

## 2020-12-31 PROCEDURE — 83605 ASSAY OF LACTIC ACID: CPT

## 2020-12-31 PROCEDURE — 82962 GLUCOSE BLOOD TEST: CPT

## 2020-12-31 PROCEDURE — 93005 ELECTROCARDIOGRAM TRACING: CPT

## 2020-12-31 PROCEDURE — 74011250637 HC RX REV CODE- 250/637: Performed by: INTERNAL MEDICINE

## 2020-12-31 PROCEDURE — 87150 DNA/RNA AMPLIFIED PROBE: CPT

## 2020-12-31 PROCEDURE — 80053 COMPREHEN METABOLIC PANEL: CPT

## 2020-12-31 PROCEDURE — 74011250636 HC RX REV CODE- 250/636: Performed by: INTERNAL MEDICINE

## 2020-12-31 PROCEDURE — 65270000029 HC RM PRIVATE

## 2020-12-31 PROCEDURE — 74011250636 HC RX REV CODE- 250/636: Performed by: EMERGENCY MEDICINE

## 2020-12-31 PROCEDURE — 86901 BLOOD TYPING SEROLOGIC RH(D): CPT

## 2020-12-31 PROCEDURE — 2709999900 HC NON-CHARGEABLE SUPPLY

## 2020-12-31 PROCEDURE — 87040 BLOOD CULTURE FOR BACTERIA: CPT

## 2020-12-31 PROCEDURE — 82306 VITAMIN D 25 HYDROXY: CPT

## 2020-12-31 PROCEDURE — 85025 COMPLETE CBC W/AUTO DIFF WBC: CPT

## 2020-12-31 PROCEDURE — 84145 PROCALCITONIN (PCT): CPT

## 2020-12-31 PROCEDURE — 36415 COLL VENOUS BLD VENIPUNCTURE: CPT

## 2020-12-31 PROCEDURE — 85379 FIBRIN DEGRADATION QUANT: CPT

## 2020-12-31 RX ORDER — ALBUTEROL SULFATE 0.83 MG/ML
2.5 SOLUTION RESPIRATORY (INHALATION)
Status: DISCONTINUED | OUTPATIENT
Start: 2020-12-31 | End: 2021-01-26 | Stop reason: HOSPADM

## 2020-12-31 RX ORDER — CLOPIDOGREL BISULFATE 75 MG/1
75 TABLET ORAL DAILY
Status: DISCONTINUED | OUTPATIENT
Start: 2020-12-31 | End: 2021-01-26 | Stop reason: HOSPADM

## 2020-12-31 RX ORDER — LORATADINE 10 MG/1
5 TABLET ORAL DAILY
Status: DISCONTINUED | OUTPATIENT
Start: 2020-12-31 | End: 2021-01-03

## 2020-12-31 RX ORDER — ACETAMINOPHEN 650 MG/1
650 SUPPOSITORY RECTAL
Status: DISCONTINUED | OUTPATIENT
Start: 2020-12-31 | End: 2021-01-26 | Stop reason: HOSPADM

## 2020-12-31 RX ORDER — POLYETHYLENE GLYCOL 3350 17 G/17G
17 POWDER, FOR SOLUTION ORAL DAILY PRN
Status: DISCONTINUED | OUTPATIENT
Start: 2020-12-31 | End: 2021-01-26 | Stop reason: HOSPADM

## 2020-12-31 RX ORDER — ENOXAPARIN SODIUM 100 MG/ML
40 INJECTION SUBCUTANEOUS DAILY
Status: DISCONTINUED | OUTPATIENT
Start: 2020-12-31 | End: 2020-12-31

## 2020-12-31 RX ORDER — MAGNESIUM SULFATE 1 G/100ML
1 INJECTION INTRAVENOUS
Status: COMPLETED | OUTPATIENT
Start: 2020-12-31 | End: 2020-12-31

## 2020-12-31 RX ORDER — INSULIN LISPRO 100 [IU]/ML
INJECTION, SOLUTION INTRAVENOUS; SUBCUTANEOUS
Status: DISCONTINUED | OUTPATIENT
Start: 2020-12-31 | End: 2021-01-26 | Stop reason: HOSPADM

## 2020-12-31 RX ORDER — CALCIUM GLUCONATE 20 MG/ML
1 INJECTION, SOLUTION INTRAVENOUS ONCE
Status: COMPLETED | OUTPATIENT
Start: 2020-12-31 | End: 2020-12-31

## 2020-12-31 RX ORDER — SODIUM CHLORIDE 0.9 % (FLUSH) 0.9 %
5-40 SYRINGE (ML) INJECTION AS NEEDED
Status: DISCONTINUED | OUTPATIENT
Start: 2020-12-31 | End: 2021-01-26 | Stop reason: HOSPADM

## 2020-12-31 RX ORDER — BUDESONIDE AND FORMOTEROL FUMARATE DIHYDRATE 160; 4.5 UG/1; UG/1
2 AEROSOL RESPIRATORY (INHALATION)
Status: DISCONTINUED | OUTPATIENT
Start: 2020-12-31 | End: 2021-01-15

## 2020-12-31 RX ORDER — SODIUM CHLORIDE 9 MG/ML
250 INJECTION, SOLUTION INTRAVENOUS AS NEEDED
Status: DISCONTINUED | OUTPATIENT
Start: 2020-12-31 | End: 2021-01-26 | Stop reason: HOSPADM

## 2020-12-31 RX ORDER — HEPARIN SODIUM 5000 [USP'U]/ML
5000 INJECTION, SOLUTION INTRAVENOUS; SUBCUTANEOUS EVERY 8 HOURS
Status: DISCONTINUED | OUTPATIENT
Start: 2020-12-31 | End: 2021-01-15

## 2020-12-31 RX ORDER — ROSUVASTATIN CALCIUM 10 MG/1
10 TABLET, COATED ORAL
Status: DISCONTINUED | OUTPATIENT
Start: 2020-12-31 | End: 2021-01-26 | Stop reason: HOSPADM

## 2020-12-31 RX ORDER — ASPIRIN 81 MG/1
81 TABLET ORAL
Status: DISCONTINUED | OUTPATIENT
Start: 2020-12-31 | End: 2021-01-26 | Stop reason: HOSPADM

## 2020-12-31 RX ORDER — SODIUM CHLORIDE 0.9 % (FLUSH) 0.9 %
5-40 SYRINGE (ML) INJECTION EVERY 8 HOURS
Status: DISCONTINUED | OUTPATIENT
Start: 2020-12-31 | End: 2021-01-26 | Stop reason: HOSPADM

## 2020-12-31 RX ORDER — SODIUM CHLORIDE 9 MG/ML
125 INJECTION, SOLUTION INTRAVENOUS CONTINUOUS
Status: DISCONTINUED | OUTPATIENT
Start: 2020-12-31 | End: 2020-12-31

## 2020-12-31 RX ORDER — HYDROCHLOROTHIAZIDE 25 MG/1
12.5 TABLET ORAL DAILY
Status: DISCONTINUED | OUTPATIENT
Start: 2020-12-31 | End: 2021-01-26 | Stop reason: HOSPADM

## 2020-12-31 RX ORDER — ACETAMINOPHEN 325 MG/1
650 TABLET ORAL
Status: DISCONTINUED | OUTPATIENT
Start: 2020-12-31 | End: 2021-01-26 | Stop reason: HOSPADM

## 2020-12-31 RX ORDER — MONTELUKAST SODIUM 10 MG/1
10 TABLET ORAL
Status: DISCONTINUED | OUTPATIENT
Start: 2020-12-31 | End: 2021-01-26 | Stop reason: HOSPADM

## 2020-12-31 RX ORDER — DEXAMETHASONE 4 MG/1
6 TABLET ORAL DAILY
Status: COMPLETED | OUTPATIENT
Start: 2020-12-31 | End: 2021-01-09

## 2020-12-31 RX ORDER — LANOLIN ALCOHOL/MO/W.PET/CERES
400 CREAM (GRAM) TOPICAL DAILY
Status: DISCONTINUED | OUTPATIENT
Start: 2020-12-31 | End: 2021-01-26 | Stop reason: HOSPADM

## 2020-12-31 RX ADMIN — MAGNESIUM SULFATE HEPTAHYDRATE 1 G: 1 INJECTION, SOLUTION INTRAVENOUS at 03:30

## 2020-12-31 RX ADMIN — ASPIRIN 81 MG: 81 TABLET, COATED ORAL at 21:05

## 2020-12-31 RX ADMIN — HEPARIN SODIUM 5000 UNITS: 5000 INJECTION INTRAVENOUS; SUBCUTANEOUS at 05:52

## 2020-12-31 RX ADMIN — CEFTRIAXONE SODIUM 1 G: 1 INJECTION, POWDER, FOR SOLUTION INTRAMUSCULAR; INTRAVENOUS at 03:36

## 2020-12-31 RX ADMIN — SODIUM CHLORIDE 1000 ML: 900 INJECTION, SOLUTION INTRAVENOUS at 03:34

## 2020-12-31 RX ADMIN — ASPIRIN 81 MG: 81 TABLET, COATED ORAL at 05:52

## 2020-12-31 RX ADMIN — DOXYCYCLINE 100 MG: 100 INJECTION, POWDER, LYOPHILIZED, FOR SOLUTION INTRAVENOUS at 10:42

## 2020-12-31 RX ADMIN — INSULIN LISPRO 4 UNITS: 100 INJECTION, SOLUTION INTRAVENOUS; SUBCUTANEOUS at 21:06

## 2020-12-31 RX ADMIN — INSULIN LISPRO 2 UNITS: 100 INJECTION, SOLUTION INTRAVENOUS; SUBCUTANEOUS at 16:40

## 2020-12-31 RX ADMIN — LORATADINE 5 MG: 10 TABLET ORAL at 10:46

## 2020-12-31 RX ADMIN — SODIUM CHLORIDE 125 ML/HR: 900 INJECTION, SOLUTION INTRAVENOUS at 05:53

## 2020-12-31 RX ADMIN — Medication 5 ML: at 15:55

## 2020-12-31 RX ADMIN — CALCIUM GLUCONATE 1000 MG: 20 INJECTION, SOLUTION INTRAVENOUS at 03:38

## 2020-12-31 RX ADMIN — Medication 10 ML: at 21:06

## 2020-12-31 RX ADMIN — MONTELUKAST SODIUM 10 MG: 10 TABLET, FILM COATED ORAL at 21:05

## 2020-12-31 RX ADMIN — MAGNESIUM GLUCONATE 500 MG ORAL TABLET 400 MG: 500 TABLET ORAL at 10:44

## 2020-12-31 RX ADMIN — DEXAMETHASONE 6 MG: 4 TABLET ORAL at 17:30

## 2020-12-31 RX ADMIN — INSULIN LISPRO 2 UNITS: 100 INJECTION, SOLUTION INTRAVENOUS; SUBCUTANEOUS at 12:45

## 2020-12-31 RX ADMIN — INSULIN LISPRO 2 UNITS: 100 INJECTION, SOLUTION INTRAVENOUS; SUBCUTANEOUS at 08:08

## 2020-12-31 RX ADMIN — HYDROCHLOROTHIAZIDE 12.5 MG: 25 TABLET ORAL at 10:44

## 2020-12-31 RX ADMIN — HEPARIN SODIUM 5000 UNITS: 5000 INJECTION INTRAVENOUS; SUBCUTANEOUS at 16:38

## 2020-12-31 RX ADMIN — DOXYCYCLINE 100 MG: 100 INJECTION, POWDER, LYOPHILIZED, FOR SOLUTION INTRAVENOUS at 21:05

## 2020-12-31 RX ADMIN — AMLODIPINE BESYLATE: 10 TABLET ORAL at 15:45

## 2020-12-31 RX ADMIN — CLOPIDOGREL BISULFATE 75 MG: 75 TABLET ORAL at 10:46

## 2020-12-31 RX ADMIN — MAGNESIUM SULFATE HEPTAHYDRATE 1 G: 1 INJECTION, SOLUTION INTRAVENOUS at 07:01

## 2020-12-31 RX ADMIN — ROSUVASTATIN 10 MG: 10 TABLET, FILM COATED ORAL at 21:05

## 2020-12-31 NOTE — PROGRESS NOTES
Hospitalist Progress Note    Patient: Sylvester Verdin MRN: [id number]  SSN: [id number]    YOB: 1945  Age: 67 y.o. Sex: male      Admit Date: 12/31/2020    LOS: 0 days     Subjective:     68 yo CM with past history of CAD s/p stents, ischemic cardiomyopathy, PAD (on Plavix and cilostazo), COPD, DM type II, HTN, HLD presents via EMS after fall at home and was admitted due to acute respiratory failure due to COVID and had an episode of VTach.    12/31 - Feels mildly SOB. No cough. No CP. Denies N/V. No diarrhea since admission. Review of systems negative except stated above. Objective:     Visit Vitals  BP (!) 139/91   Pulse 91   Temp 98 °F (36.7 °C)   Resp 22   Ht 6' 1\" (1.854 m)   Wt 131.1 kg (289 lb)   SpO2 90%   BMI 38.13 kg/m²      Oxygen Therapy  O2 Sat (%): 90 % (12/31/20 1502)  Pulse via Oximetry: 82 beats per minute (12/31/20 0551)  O2 Device: Nasal cannula (12/31/20 1502)  O2 Flow Rate (L/min): 3 l/min (12/31/20 1502)      Intake and Output: No intake or output data in the 24 hours ending 12/31/20 1641      Physical Exam:   GENERAL: alert, cooperative, no distress, appears stated age  EYE: conjunctivae/corneas clear. PERRL. THROAT & NECK: normal and no erythema or exudates noted. LUNG: clear to auscultation bilaterally  HEART: regular rate and rhythm, S1S2, no murmur, no JVD  ABDOMEN: soft, non-tender, non-distended. Bowel sounds normal.   EXTREMITIES:  No edema, 2+ pedal/radial pulses bilaterally  SKIN: no rash or abnormalities  NEUROLOGIC: A&Ox3. Cranial nerves 2-12 grossly intact.     Lab/Data Review:  Recent Results (from the past 24 hour(s))   EKG, 12 LEAD, INITIAL    Collection Time: 12/31/20  1:24 AM   Result Value Ref Range    Ventricular Rate 108 BPM    Atrial Rate 108 BPM    P-R Interval 212 ms    QRS Duration 102 ms    Q-T Interval 324 ms    QTC Calculation (Bezet) 434 ms    Calculated P Axis 73 degrees    Calculated R Axis 105 degrees    Calculated T Axis 17 degrees Diagnosis       !! AGE AND GENDER SPECIFIC ECG ANALYSIS !! Sinus tachycardia with 1st degree A-V block with Premature supraventricular   complexes with occasional Premature  ventricular complexes  Rightward axis  Borderline ECG  When compared with ECG of 05-DEC-2018 17:36,  No significant change was found  Confirmed by LISA LIN (), JUANA SINGLETON ([id number]) on 12/31/2020 6:33:52 AM     PROCALCITONIN    Collection Time: 12/31/20  1:32 AM   Result Value Ref Range    Procalcitonin 0.11 ng/mL   CBC WITH AUTOMATED DIFF    Collection Time: 12/31/20  1:36 AM   Result Value Ref Range    WBC 7.8 4.3 - 11.1 K/uL    RBC 3.54 (L) 4.23 - 5.6 M/uL    HGB 11.6 (L) 13.6 - 17.2 g/dL    HCT 35.3 (L) 41.1 - 50.3 %    MCV 99.7 (H) 79.6 - 97.8 FL    MCH 32.8 26.1 - 32.9 PG    MCHC 32.9 31.4 - 35.0 g/dL    RDW 14.6 11.9 - 14.6 %    PLATELET 517 (L) 813 - 450 K/uL    MPV 11.7 9.4 - 12.3 FL    ABSOLUTE NRBC 0.00 0.0 - 0.2 K/uL    DF AUTOMATED      NEUTROPHILS 85 (H) 43 - 78 %    LYMPHOCYTES 5 (L) 13 - 44 %    MONOCYTES 9 4.0 - 12.0 %    EOSINOPHILS 0 (L) 0.5 - 7.8 %    BASOPHILS 0 0.0 - 2.0 %    IMMATURE GRANULOCYTES 1 0.0 - 5.0 %    ABS. NEUTROPHILS 6.6 1.7 - 8.2 K/UL    ABS. LYMPHOCYTES 0.4 (L) 0.5 - 4.6 K/UL    ABS. MONOCYTES 0.7 0.1 - 1.3 K/UL    ABS. EOSINOPHILS 0.0 0.0 - 0.8 K/UL    ABS. BASOPHILS 0.0 0.0 - 0.2 K/UL    ABS. IMM. GRANS. 0.1 0.0 - 0.5 K/UL   METABOLIC PANEL, COMPREHENSIVE    Collection Time: 12/31/20  1:36 AM   Result Value Ref Range    Sodium 127 (L) 136 - 145 mmol/L    Potassium 4.1 3.5 - 5.1 mmol/L    Chloride 104 98 - 107 mmol/L    CO2 21 21 - 32 mmol/L    Anion gap 2 (L) 7 - 16 mmol/L    Glucose 218 (H) 65 - 100 mg/dL    BUN 23 8 - 23 MG/DL    Creatinine 1.81 (H) 0.8 - 1.5 MG/DL    GFR est AA 47 (L) >60 ml/min/1.73m2    GFR est non-AA 39 (L) >60 ml/min/1.73m2    Calcium 8.4 8.3 - 10.4 MG/DL    Bilirubin, total 0.5 0.2 - 1.1 MG/DL    ALT (SGPT) 74 (H) 12 - 65 U/L    AST (SGOT) 61 (H) 15 - 37 U/L    Alk.  phosphatase 75 50 - 136 U/L    Protein, total 7.2 6.3 - 8.2 g/dL    Albumin 3.7 3.2 - 4.6 g/dL    Globulin 3.5 2.3 - 3.5 g/dL    A-G Ratio 1.1 (L) 1.2 - 3.5     TROPONIN-HIGH SENSITIVITY    Collection Time: 12/31/20  1:36 AM   Result Value Ref Range    Troponin-High Sensitivity 72.9 (H) 0 - 14 pg/mL   MAGNESIUM    Collection Time: 12/31/20  1:36 AM   Result Value Ref Range    Magnesium 1.7 (L) 1.8 - 2.4 mg/dL   LACTIC ACID    Collection Time: 12/31/20  2:34 AM   Result Value Ref Range    Lactic acid 1.9 0.4 - 2.0 MMOL/L   SARS-COV-2    Collection Time: 12/31/20  5:49 AM   Result Value Ref Range    Specimen source Nasopharyngeal      COVID-19 rapid test Detected (AA) NOTD     TROPONIN-HIGH SENSITIVITY    Collection Time: 12/31/20  5:49 AM   Result Value Ref Range    Troponin-High Sensitivity 425.8 (HH) 0 - 14 pg/mL   METABOLIC PANEL, BASIC    Collection Time: 12/31/20  5:49 AM   Result Value Ref Range    Sodium 132 (L) 136 - 145 mmol/L    Potassium 4.5 3.5 - 5.1 mmol/L    Chloride 105 98 - 107 mmol/L    CO2 21 21 - 32 mmol/L    Anion gap 6 (L) 7 - 16 mmol/L    Glucose 163 (H) 65 - 100 mg/dL    BUN 23 8 - 23 MG/DL    Creatinine 1.50 0.8 - 1.5 MG/DL    GFR est AA 59 (L) >60 ml/min/1.73m2    GFR est non-AA 48 (L) >60 ml/min/1.73m2    Calcium 8.1 (L) 8.3 - 10.4 MG/DL   MAGNESIUM    Collection Time: 12/31/20  5:49 AM   Result Value Ref Range    Magnesium 1.9 1.8 - 2.4 mg/dL   GLUCOSE, POC    Collection Time: 12/31/20  7:31 AM   Result Value Ref Range    Glucose (POC) 178 (H) 65 - 100 mg/dL   GLUCOSE, POC    Collection Time: 12/31/20 12:26 PM   Result Value Ref Range    Glucose (POC) 157 (H) 65 - 100 mg/dL   GLUCOSE, POC    Collection Time: 12/31/20  4:08 PM   Result Value Ref Range    Glucose (POC) 157 (H) 65 - 100 mg/dL       SARS-CoV-2 Lab Results  \"Novel Coronavirus\" Test: No results found for: COV2NT   \"Emergent Disease\" Test: No results found for: EDPR  \"SARS-COV-2\" Test: No results found for: XGCOVT  \"Precision Labs\" Test: No results found for: RSLT  Rapid Test:   Lab Results   Component Value Date/Time    COVR Detected (AA) 12/31/2020 05:49 AM           Imaging:  Xr Chest Port    Result Date: 12/31/2020  EXAM: Chest x-ray. INDICATION: Dyspnea. COMPARISON: Prior chest x-ray on April 9, 2019. TECHNIQUE: 2 frontal views of the chest were obtained. FINDINGS: The lungs are hyperexpanded, consistent with the reported history of COPD. There is new mild infiltrate in the right lung base. The left lung is clear. The heart is borderline enlarged. The mediastinal contour and pulmonary vasculature are normal. No pneumothorax or pleural effusion is seen. There are degenerative changes in the spine. IMPRESSION: 1. Mild right lung base infiltrate, suspect for acute pneumonia. 2. COPD. No results found for this visit on 12/31/20. Cultures: All Micro Results     Procedure Component Value Units Date/Time    CULTURE, BLOOD [[id number]] Collected: 12/31/20 0549    Order Status: Completed Specimen: Blood Updated: 12/31/20 0631    CULTURE, BLOOD [[id number]] Collected: 12/31/20 0309    Order Status: Completed Specimen: Blood Updated: 12/31/20 0358          Assessment/Plan:     Principal Problem:    Acute respiratory failure with hypoxia ([address] (10/23/2014)  - Due to COVID + pneumonia  - Start Decadron  - Start Remdesivir  - Continue home aerosols  - Continue Ceftriaxone + Doxy  - Wean oxygen as appropriate    Active Problems:    Monomorphic ventricular tachycardia ([address] (12/31/2020)  - Patient found to have monomorphic VT with EMS, converted on his own  - Given Mag in ER  - Cardiology saw him in ER and recommended correcting electrolytes  - Start Amio if it happens again  - Cardiology signed out, didn't write a note?       COVID-19 (12/31/2020)  - COVID positive 12/30  - Now hypoxic  - Start Decadron 6mg daily x 10 days  - Start Remdesivir  - Consent for convalescent plasma  - Check coagulations studies  - Check inflammatory markers  - Check Vitamin D      AGUSTIN (acute kidney injury) ([facility] (9/15/2014)  - Resolved      CAP (community acquired pneumonia) (12/31/2020)  - CXR with RLL infiltrate  - Continue Ceftriaxone + Doxy  - Monitor labs and vitals      Elevated troponin (12/31/2020)  - Troponin 72 --> 425  - No acute CP  - ?COVID related vs VT  - Repeat troponin until trending down  - Continue ASA + Plavix  - Cardiology assess in ER(?) --> spoke to them and they think it's COVID      DM (diabetes mellitus) type II, controlled, with peripheral vascular disorder ([facility] [address] (9/11/2014)  - A1C on 12/16/20 was 6.8  - Continue Humalog SSI      Coronary artery disease involving native coronary artery of native heart without angina pectoris (10/12/2011)  - No acute CP, but trop elevated  - Continue ASA + Plavix  - Continue Crestor      COPD (chronic obstructive pulmonary disease) ([facility] [address] (4/10/2015)  - No acute wheezing  - Continue home aerosols      PAD (peripheral artery disease) ([facility] [address] (11/10/2015)  - Continue ASA + Plavix      Morbid obesity ([facility] [address] (10/12/2011)      Hyperlipidemia (11/10/2015)  - Continue Crestor      Today's Plan: Check COVID studies. Start Decadron + Remdesivir. Give convalescent plasma.     DIET DIABETIC CONSISTENT CARB Regular    DVT Prophylaxis: Heparin    Discharge Plan: TBD      Signed By: Dhruv Duke DO     December 31, 2020

## 2020-12-31 NOTE — ED TRIAGE NOTES
Pt arrives via [facility] from home. Initially called out for a trip and fall. No LOC, did not hit head. As pt was being assessed by EMS, EMS found pt to be in V-tach with a pulse at a rate of 250 on the monitor. Pt was alert and oriented during this time. EMS was about to cardiovert pt when he spontaneously reverted back to NSR. No meds given en route. Pt only complaint at this time is that he is tired. Denies CP, SOB. Does state he is lightheaded.

## 2020-12-31 NOTE — PROGRESS NOTES
Patient arrived to room 522 alert, oriented x4, breathing unlabored on 3L NC with SaO2 90%, telemonitor placed and pt running NSR rate 91 with PVC's. Pt denies pain/SOB/complaints at this time.

## 2020-12-31 NOTE — ED NOTES
TRANSFER - OUT REPORT:    Verbal report given to Kailee Blum RN (name) on Saranya Navarro  being transferred to [id number] (unit) for routine progression of care       Report consisted of patients Situation, Background, Assessment and   Recommendations(SBAR). Information from the following report(s) SBAR was reviewed with the receiving nurse. Lines:   Peripheral IV 12/31/20 Left Arm (Active)   Site Assessment Clean, dry, & intact 12/31/20 0314   Phlebitis Assessment 0 12/31/20 0314   Infiltration Assessment 0 12/31/20 0314   Dressing Status Clean, dry, & intact 12/31/20 [id number]        Opportunity for questions and clarification was provided.       Patient transported with:   [employer]

## 2020-12-31 NOTE — ED PROVIDER NOTES
Patient is a 42-year-old male presenting to the emergency department today by EMS secondary to a rapid heart rate with spontaneous resolution. The patient says that he needed to use the restroom and went to stand up on the side of his bed but his bare feet on the hardwood floor caused him to slide down to the ground landing on his bottom. The said that he struggled to get up off the ground even with the assistance of his wife and son but could not do it. EMS was called out and they got him up off the ground but when they a got him on the ground he was exerted and they put a monitor on and his heart rate was 250 bpm with a pulse of 120 bpm according to the EMS report. A rhythm strip is not available to correlate this. Patient says he feels tired and just wants to go back to bed now and denied having any chest pain. He does have a history of heart disease with follow-up by [facility] cardiology.            Past Medical History:   Diagnosis Date    AGUSTIN (acute kidney injury) ([address] 9/15/2014    Allergic rhinitis, CAUSE UNSPECIFIED 11/10/2015    Asthma; SEASONAL 11/10/2015    Benign essential hypertension 11/10/2015    Benign neoplasm of colon 11/10/2015    CAD (coronary artery disease) 1998, 1999    mix2, 3 stents [id number]    CAD (coronary artery disease) 11/10/2015    Cardiomyopathy ([address] 29/30/1420    Complicated wound infection 11/10/2015    COPD /OTHER 11/10/2015    COPD exacerbation ([address] 10/12/2011    Diabetes mellitus type 2, controlled ([address] 11/10/2015    Diabetic neuropathy  11/10/2015    Disruption of wound, unspecified 10/9/2014    Encounter for long-term (current) use of other high-risk medications 11/10/2015    Extremity atherosclerosis with intermittent claudication ([address] 11/10/2015    H/O endarterectomy; 9/2014 11/10/2015    Hiatal hernia 11/10/2015    Hyperglycemia  11/10/2015    Hyperlipidemia 11/10/2015    Myocardial infarction ([address] 1999    Myocardial infarction ([address] (1999) 11/10/2015    Obesity, UNSPECIFIED 11/10/2015    Orthostatic hypotension 11/10/2015    Osteoarthritis 11/10/2015    Peripheral vascular disease ([address] 2/2014; S/P BILAT FEMORAL 11/10/2015    PVC (premature ventricular contraction)     Rash 74/40/8050    Seroma complicating a procedure 10/2/2014    Sleep apnea     cpap    Uncontrolled type 2 diabetes mellitus ([address] 11/10/2015    Vertiginous syndromes & LABYRINTHINE DISORDERS/UNSPEC 11/10/2015       Past Surgical History:   Procedure Laterality Date    ABDOMEN SURGERY PROC UNLISTED  1960    abdominal cyst removed    CARDIAC SURG PROCEDURE UNLIST  1999    stents x3    HX CORONARY STENT PLACEMENT  1999    HX CORONARY STENT PLACEMENT  12/05/2018    LCX OM1:2.5 x 12 [place] MIRELA    HX HEART CATHETERIZATION  12/05/2018    LV EF=40%. LM:nl. LAD:30-40% mid stenosis. LCX OM1:95% stenosis. RCA:100% occlusion at RV branch.     VASCULAR SURGERY PROCEDURE UNLIST  9/11/14    sally femoral endarterectomy    VASCULAR SURGERY PROCEDURE UNLIST  11/5/14    Repair of right common femoral artery          Family History:   Problem Relation Age of Onset    Stroke Mother     Hypertension Mother     Breast Cancer Mother     Heart Attack Father     Heart Disease Father     Other Father         DIVERTICULITIS    Lung Disease Brother         mesothioloma    Diabetes Sister     Crohn's Disease Sister        Social History     Socioeconomic History    Marital status:      Spouse name: Not on file    Number of children: Not on file    Years of education: Not on file    Highest education level: Not on file   Occupational History    Occupation: retired    Occupation: [employer]     Comment: Positive for Asbestos exposure    Occupation: [address] resource strain: Not on file    Food insecurity     Worry: Not on file     Inability: Not on file   [employer] needs     Medical: Not on file     Non-medical: Not on file   Tobacco Use    Smoking status: Former Smoker     Packs/day: 1.00     Years: 50.00     Pack years: 50.00     Quit date: 10/2/2011     Years since quittin.2    Smokeless tobacco: Current User     Types: Chew   Substance and Sexual Activity    Alcohol use: Yes     Alcohol/week: 0.0 standard drinks     Comment: rare    Drug use: No    Sexual activity: Not on file   Lifestyle    Physical activity     Days per week: Not on file     Minutes per session: Not on file    Stress: Not on file   Relationships    Social connections     Talks on phone: Not on file     Gets together: Not on file     Attends Oriental orthodox service: Not on file     Active member of club or organization: Not on file     Attends meetings of clubs or organizations: Not on file     Relationship status: Not on file    Intimate partner violence     Fear of current or ex partner: Not on file     Emotionally abused: Not on file     Physically abused: Not on file     Forced sexual activity: Not on file   Other Topics Concern    Not on file   Social History Narrative    Lives with wife         ALLERGIES: Daypro [oxaprozin]    Review of Systems   Cardiovascular: Positive for palpitations. All other systems reviewed and are negative. Vitals:    20 0126 20 0138   BP: 129/77    Pulse: (!) 114    Resp: 16    Temp: 98.4 °F (36.9 °C)    SpO2: 93% 97%   Weight: 131.1 kg (289 lb)    Height: 6' 1\" (1.854 m)             Physical Exam     GENERAL:The patient has Body mass index is 38.13 kg/m². Well-hydrated. No acute distress. VITAL SIGNS: Heart rate, blood pressure, respiratory rate reviewed as recorded in  nurse's notes  EYES: Pupils reactive. Extraocular motion intact. No conjunctival redness or drainage. LUNGS: Breath sounds clear and equal bilaterally no accessory muscle use  CHEST: No deformity  CARDIOVASCULAR: Normal sinus rhythm with frequent PVCs. No gallops or rubs appreciated. EXTREMITIES: No clubbing or cyanosis.  No joint swelling. Normal muscle tone. No  restricted range of motion appreciated. NEUROLOGIC: Sensation is grossly intact. Cranial nerve exam reveals face is  symmetrical, tongue is midline speech is clear. SKIN: No rash or petechiae. Good skin turgor palpated. PSYCHIATRIC: Alert and oriented. Appropriate behavior and judgment. MDM  Number of Diagnoses or Management Options  Diagnosis management comments: Cardiac arrhythmia, atrial fibrillation,       Amount and/or Complexity of Data Reviewed  Clinical lab tests: reviewed and ordered  Review and summarize past medical records: yes      ED Course as of Dec 31 0252   Thu Dec 31, 2020   0213 IMPRESSION:   1. Mild right lung base infiltrate, suspect for acute pneumonia. 2. COPD. XR CHEST PORT [KH]   0224 Creatinine(!): 1.81 [KH]   0225 Sodium(!): 127 [KH]   0248 I spoke to Dr. Dorinda Ewing the hospitalist and he is agreeable with admitting the patient to his service. Patient was started on medications for correction of his electrolyte abnormalities and cardiology will be consulted. [KH]   0527 Case discussed with Dr. Bing Sanabria cardiology and he requested that the patient get 2 g of magnesium IV. No other intervention at this time however if the patient goes back into V. tach he recommends a loading him with amiodarone. [KH]      ED Course User Index  [KH] Ishan Mohamud DO       Critical Care  Performed by: Ishan Mohamud DO  Authorized by: Ishan Mohamud DO     Comments:      Critical care time: 120 minutes of critical care time was performed in the emergency department. This was separate from any other procedures listed during the patients emergency department course. The failure to initiate these interventions on an urgent basis would likely have resulted in sudden, clinically significant or life-threatening deterioration in the patients condition.

## 2020-12-31 NOTE — H&P
HOSPITALIST H&P/CONSULT  NAME:  Yulissa Franco   Age:  67 y.o.  :   1945   MRN:   [id number]  PCP: Daniele Cole MD  Consulting MD:  Treatment Team: Attending Provider: Howard Nicholson DO  HPI:     68 yo CM with past history of CAD s/p stents, ischemic cardiomyopathy, PAD (on Plavix and cilostazo), COPD, DM type II, HTN, HLD presents via EMS after fall at home. Patient was getting up out of bed to go to the bathroom when he slipped and fell on the floor. He did not lose consciousness or black out. He did have diarrhea when he was down on the ground, he was not able to get up. He says he had been feeling weaker the past several days. He says that his daughter recently tested positive for COVID. He denies fevers/chills, chest pain, or shortness of breath. EMS was called and patient was found to be in monomorphic Vtach with HR of 250 bpm that spontaneously converted on its own without any intervention. ED Course: SCr of 1.8 (baseline around 1.3). Sodium of 127. LA of 1.9. Troponin of 73. Procalcitonin of 0.11. CXR shows right-sided opacity. Blood cultures collected x2. Mg of 1.7. Given calcium gluconate, Mg sulfate 2 gm, and NS bolus. Hospitalist consulted for admission.      Complete ROS done and is as stated in HPI or otherwise negative  Past Medical History:   Diagnosis Date    AGUSTIN (acute kidney injury) ([facility] 9/15/2014    Allergic rhinitis, CAUSE UNSPECIFIED 11/10/2015    Asthma; SEASONAL 11/10/2015    Benign essential hypertension 11/10/2015    Benign neoplasm of colon 11/10/2015    CAD (coronary artery disease) ,     mix2, 3 stents [id number]    CAD (coronary artery disease) 11/10/2015    Cardiomyopathy ([facility]     Complicated wound infection 11/10/2015    COPD /OTHER 11/10/2015    COPD exacerbation ([facility] 10/12/2011    Diabetes mellitus type 2, controlled ([facility] 11/10/2015    Diabetic neuropathy  11/10/2015    Disruption of wound, unspecified 10/9/2014    Encounter for long-term (current) use of other high-risk medications 11/10/2015    Extremity atherosclerosis with intermittent claudication ([facility] 11/10/2015    H/O endarterectomy; 9/2014 11/10/2015    Hiatal hernia 11/10/2015    Hyperglycemia  11/10/2015    Hyperlipidemia 11/10/2015    Myocardial infarction ([facility] 1999    Myocardial infarction ([facility] (1999) 11/10/2015    Obesity, UNSPECIFIED 11/10/2015    Orthostatic hypotension 11/10/2015    Osteoarthritis 11/10/2015    Peripheral vascular disease ([facility] 2/2014; S/P BILAT FEMORAL 11/10/2015    PVC (premature ventricular contraction)     Rash 55/24/0825    Seroma complicating a procedure 10/2/2014    Sleep apnea     cpap    Uncontrolled type 2 diabetes mellitus ([facility] 11/10/2015    Vertiginous syndromes & LABYRINTHINE DISORDERS/UNSPEC 11/10/2015      Past Surgical History:   Procedure Laterality Date    ABDOMEN SURGERY PROC UNLISTED  1960    abdominal cyst removed    CARDIAC SURG PROCEDURE UNLIST  1999    stents x3    HX CORONARY STENT PLACEMENT  1999    HX CORONARY STENT PLACEMENT  12/05/2018    LCX OM1:2.5 x 12 [place] MIRELA    HX HEART CATHETERIZATION  12/05/2018    LV EF=40%. LM:nl. LAD:30-40% mid stenosis. LCX OM1:95% stenosis. RCA:100% occlusion at RV branch.  VASCULAR SURGERY PROCEDURE UNLIST  9/11/14    sally femoral endarterectomy    VASCULAR SURGERY PROCEDURE UNLIST  11/5/14    Repair of right common femoral artery       Prior to Admission Medications   Prescriptions Last Dose Informant Patient Reported? Taking? FISH OIL CONCENTRATE 1,000 mg cap   Yes No   Sig: Take 1-2 Caps by mouth two (2) times a day. One tab in am & 2 tabs in pm   JANUVIA 100 mg tablet   No No   Sig: TAKE ONE TABLET BY MOUTH EVERY DAY   MULTIVITS-MINERALS/FA/LYCOPENE (ONE-A-DAY MEN'S MULTIVITAMIN PO)   Yes No   Sig: Take 1 Tab by mouth daily.    albuterol (PROVENTIL HFA, VENTOLIN HFA, PROAIR HFA) 90 mcg/actuation inhaler   No No   Sig: USE 2 PUFFS FOUR TIMES A DAY AS NEEDED amLODIPine-valsartan (EXFORGE)  mg per tablet   No No   Sig: TAKE 1/2 TO 1 TABLET BY MOUTH EVERY DAY   ascorbic acid, vitamin C, (VITAMIN C) 500 mg tablet   Yes No   Sig: Take 500 mg by mouth nightly. aspirin 81 mg tablet   Yes No   Sig: Take 81 mg by mouth nightly. cefUROXime (CEFTIN) 500 mg tablet   Yes No   Sig: daily. cetirizine (ZYRTEC) 10 mg tablet   Yes No   Sig: Take  by mouth as needed for Allergies. cholecalciferol, vitamin D3, (VITAMIN D3) 2,000 unit tab   Yes No   Sig: Take 2,000 Units by mouth nightly. cilostazoL (PLETAL) 50 mg tablet   No No   Sig: TAKE 1 TABLET BY MOUTH TWICE DAILY. clopidogreL (PLAVIX) 75 mg tab   No No   Sig: TAKE ONE TABLET BY MOUTH EVERY DAY   cpap machine kit   Yes No   Sig: Take  by inhalation. qhs   diclofenac (VOLTAREN) 1 % gel   No No   Sig: Apply 4 g to affected area four (4) times daily as needed. fluticasone propion-salmeteroL (ADVAIR/WIXELA) 250-50 mcg/dose diskus inhaler   No No   Sig: USE 1 PUFF TWICE DAILY; RINSE MOUTH AFTER EACH USE.   fluticasone propionate (FLONASE) 50 mcg/actuation nasal spray   No No   Sig: USE 1 OR 2 SPRAYS IN EACH NOSTRIL EVERY DAY   garlic 1,159 mg cap   Yes No   Sig: Take 1 Tab by mouth two (2) times a day. glipiZIDE SR (GLUCOTROL XL) 10 mg CR tablet   No No   Sig: TAKE ONE TABLET BY MOUTH 2 TIMES A DAY   glucosamine/chondr funez A sod (GLUCOSAMINE-CHONDROITIN) 750-600 mg tab   Yes No   Sig: Take  by mouth two (2) times a day. glucose blood VI test strips (ASCENSIA AUTODISC VI, ONE TOUCH ULTRA TEST VI) strip   No No   Sig: E11.9   guaiFENesin (MUCINEX) 1,200 mg Ta12 ER tablet   Yes No   Sig: Take 1,200 mg by mouth two (2) times daily as needed. hydroCHLOROthiazide (HYDRODIURIL) 25 mg tablet   No No   Sig: TAKE 1/2 TO 1 TABLET BY MOUTH EVERY DAY   meclizine (ANTIVERT) 25 mg tablet   Yes No   Sig: Take 25 mg by mouth three (3) times daily as needed.    metFORMIN (GLUCOPHAGE) 500 mg tablet   No No   Sig: TAKE TWO TABLETS BY MOUTH 2 TIMES A DAY   montelukast (SINGULAIR) 10 mg tablet   No No   Sig: TAKE 1 TABLET BY MOUTH EVERY DAY AT BEDTIME   nitroglycerin (NITROSTAT) 0.4 mg SL tablet   Yes No   Si.4 mg by SubLINGual route every five (5) minutes as needed for Chest Pain. rosuvastatin (CRESTOR) 10 mg tablet   No No   Sig: TAKE ONE TABLET BY MOUTH ONCE A DAY      Facility-Administered Medications: None     Allergies   Allergen Reactions    Daypro [Oxaprozin] Other (comments)     ELEVATED BLOOD PRESSURE      Social History     Tobacco Use    Smoking status: Former Smoker     Packs/day: 1.00     Years: 50.00     Pack years: 50.00     Quit date: 10/2/2011     Years since quittin.2    Smokeless tobacco: Current User     Types: Chew   Substance Use Topics    Alcohol use: Yes     Alcohol/week: 0.0 standard drinks     Comment: rare      Family History   Problem Relation Age of Onset   [facility] Stroke Mother     Hypertension Mother     Breast Cancer Mother     Heart Attack Father     Heart Disease Father     Other Father         DIVERTICULITIS    Lung Disease Brother         mesothioloma    Diabetes Sister     Crohn's Disease Sister       Objective:     Visit Vitals  /77   Pulse (!) 114   Temp 98.4 °F (36.9 °C)   Resp 16   Ht 6' 1\" (1.854 m)   Wt 131.1 kg (289 lb)   SpO2 97%   BMI 38.13 kg/m²      Temp (24hrs), Av.4 °F (36.9 °C), Min:98.4 °F (36.9 °C), Max:98.4 °F (36.9 °C)    Oxygen Therapy  O2 Sat (%): 97 % (20)  O2 Device: Room air (20)  Physical Exam:  General:    Alert, cooperative, no distress, appears stated age. Head:   Normocephalic, without obvious abnormality, atraumatic. Nose:  Nares normal. No drainage or sinus tenderness. Lungs:   Clear to auscultation bilaterally. No Wheezing or Rhonchi. No rales. Heart:   Regular rate and rhythm  Abdomen:   Soft, non-tender. Not distended. Bowel sounds normal.   Extremities: No cyanosis. No edema.  No clubbing  Skin:     Texture, turgor normal. No rashes or lesions. Not Jaundiced  Neurologic: Alert and oriented x 3, no focal deficits   Data Review:   Recent Results (from the past 24 hour(s))   PROCALCITONIN    Collection Time: 12/31/20  1:32 AM   Result Value Ref Range    Procalcitonin 0.11 ng/mL   CBC WITH AUTOMATED DIFF    Collection Time: 12/31/20  1:36 AM   Result Value Ref Range    WBC 7.8 4.3 - 11.1 K/uL    RBC 3.54 (L) 4.23 - 5.6 M/uL    HGB 11.6 (L) 13.6 - 17.2 g/dL    HCT 35.3 (L) 41.1 - 50.3 %    MCV 99.7 (H) 79.6 - 97.8 FL    MCH 32.8 26.1 - 32.9 PG    MCHC 32.9 31.4 - 35.0 g/dL    RDW 14.6 11.9 - 14.6 %    PLATELET 244 (L) 636 - 450 K/uL    MPV 11.7 9.4 - 12.3 FL    ABSOLUTE NRBC 0.00 0.0 - 0.2 K/uL    DF AUTOMATED      NEUTROPHILS 85 (H) 43 - 78 %    LYMPHOCYTES 5 (L) 13 - 44 %    MONOCYTES 9 4.0 - 12.0 %    EOSINOPHILS 0 (L) 0.5 - 7.8 %    BASOPHILS 0 0.0 - 2.0 %    IMMATURE GRANULOCYTES 1 0.0 - 5.0 %    ABS. NEUTROPHILS 6.6 1.7 - 8.2 K/UL    ABS. LYMPHOCYTES 0.4 (L) 0.5 - 4.6 K/UL    ABS. MONOCYTES 0.7 0.1 - 1.3 K/UL    ABS. EOSINOPHILS 0.0 0.0 - 0.8 K/UL    ABS. BASOPHILS 0.0 0.0 - 0.2 K/UL    ABS. IMM. GRANS. 0.1 0.0 - 0.5 K/UL   METABOLIC PANEL, COMPREHENSIVE    Collection Time: 12/31/20  1:36 AM   Result Value Ref Range    Sodium 127 (L) 136 - 145 mmol/L    Potassium 4.1 3.5 - 5.1 mmol/L    Chloride 104 98 - 107 mmol/L    CO2 21 21 - 32 mmol/L    Anion gap 2 (L) 7 - 16 mmol/L    Glucose 218 (H) 65 - 100 mg/dL    BUN 23 8 - 23 MG/DL    Creatinine 1.81 (H) 0.8 - 1.5 MG/DL    GFR est AA 47 (L) >60 ml/min/1.73m2    GFR est non-AA 39 (L) >60 ml/min/1.73m2    Calcium 8.4 8.3 - 10.4 MG/DL    Bilirubin, total 0.5 0.2 - 1.1 MG/DL    ALT (SGPT) 74 (H) 12 - 65 U/L    AST (SGOT) 61 (H) 15 - 37 U/L    Alk.  phosphatase 75 50 - 136 U/L    Protein, total 7.2 6.3 - 8.2 g/dL    Albumin 3.7 3.2 - 4.6 g/dL    Globulin 3.5 2.3 - 3.5 g/dL    A-G Ratio 1.1 (L) 1.2 - 3.5     TROPONIN-HIGH SENSITIVITY    Collection Time: 12/31/20  1:36 AM   Result Value Ref Range    Troponin-High Sensitivity 72.9 (H) 0 - 14 pg/mL   MAGNESIUM    Collection Time: 12/31/20  1:36 AM   Result Value Ref Range    Magnesium 1.7 (L) 1.8 - 2.4 mg/dL   LACTIC ACID    Collection Time: 12/31/20  2:34 AM   Result Value Ref Range    Lactic acid 1.9 0.4 - 2.0 MMOL/L     Imaging /Procedures /Studies     Assessment and Plan: Active Hospital Problems    Diagnosis Date Noted    CAP (community acquired pneumonia) 12/31/2020    Monomorphic ventricular tachycardia ([facility] 12/31/2020    Hyperlipidemia 11/10/2015    PAD (peripheral artery disease) ([facility] 11/10/2015    COPD (chronic obstructive pulmonary disease) ([facility] 04/10/2015    AGUSTIN (acute kidney injury) ([facility] 09/15/2014    DM (diabetes mellitus) type II, controlled, with peripheral vascular disorder ([facility] 09/11/2014    Morbid obesity ([facility] 10/12/2011    Coronary artery disease involving native coronary artery of native heart without angina pectoris 10/12/2011       PLAN    # Monomorphic Vtach  - unclear etiology but patient with known cardiac ischemia  - ordered TTE  - received calcium gluconate, magnesium  - goal K>4 and Mg>2  - cardiology consulted, recommend to start IV amiodarone with bolus if recurs  - telemetry  - stop cilostazol as this can cause tachyarrhythmias and is contraindicated in ischemic cardiomyopathy   - trend trops     # CAP  - started Rocephin/doxycycline empirically  - ordered COVID screen    # AGUSTIN  - SCr of 1.8 (baseline around 1.8)  - IVF resuscitation  - avoid IV contrast and NSAIDs  - strict I's/O's  - daily BMPs    # History of CAD  - TTE as above  - continue ASA and Plavix  - continue high intensity statin    # COPD, currently compensated  - continue home meds  - jet nebs as needed    # HTN  - continue current regimen    F/E/N: IVF infusion, replete electrolytes as needed, diabetic diet    Ppx: heparin SQ for VTE    Code Status: FULL CODE    Disposition: Admit to Inpatient with plan as above.  Discussed with patient at bedside. All questions answered.      Signed By: Cheyanne Burris,      December 31, 2020

## 2020-12-31 NOTE — PROGRESS NOTES
TRANSFER - IN REPORT:    Verbal report received from [facility] (name) on Derral Haw  being received from ED(unit) for routine progression of care      Report consisted of patients Situation, Background, Assessment and   Recommendations(SBAR). Information from the following report(s) SBAR was reviewed with the receiving nurse. Opportunity for questions and clarification was provided. Assessment completed upon patients arrival to unit and care assumed.

## 2021-01-01 LAB
ABO + RH BLD: NORMAL
ACC. NO. FROM MICRO ORDER, ACCP: ABNORMAL
ALBUMIN SERPL-MCNC: 3.2 G/DL (ref 3.2–4.6)
ALBUMIN/GLOB SERPL: 1 {RATIO} (ref 1.2–3.5)
ALP SERPL-CCNC: 62 U/L (ref 50–136)
ALT SERPL-CCNC: 71 U/L (ref 12–65)
ANION GAP SERPL CALC-SCNC: 7 MMOL/L (ref 7–16)
AST SERPL-CCNC: 131 U/L (ref 15–37)
BASOPHILS # BLD: 0 K/UL (ref 0–0.2)
BASOPHILS NFR BLD: 0 % (ref 0–2)
BILIRUB SERPL-MCNC: 0.4 MG/DL (ref 0.2–1.1)
BLOOD GROUP ANTIBODIES SERPL: NORMAL
BUN SERPL-MCNC: 17 MG/DL (ref 8–23)
CALCIUM SERPL-MCNC: 8 MG/DL (ref 8.3–10.4)
CHLORIDE SERPL-SCNC: 104 MMOL/L (ref 98–107)
CO2 SERPL-SCNC: 23 MMOL/L (ref 21–32)
CREAT SERPL-MCNC: 1.12 MG/DL (ref 0.8–1.5)
DIFFERENTIAL METHOD BLD: ABNORMAL
EOSINOPHIL # BLD: 0 K/UL (ref 0–0.8)
EOSINOPHIL NFR BLD: 0 % (ref 0.5–7.8)
ERYTHROCYTE [DISTWIDTH] IN BLOOD BY AUTOMATED COUNT: 14.6 % (ref 11.9–14.6)
GLOBULIN SER CALC-MCNC: 3.3 G/DL (ref 2.3–3.5)
GLUCOSE BLD STRIP.AUTO-MCNC: 210 MG/DL (ref 65–100)
GLUCOSE BLD STRIP.AUTO-MCNC: 251 MG/DL (ref 65–100)
GLUCOSE BLD STRIP.AUTO-MCNC: 270 MG/DL (ref 65–100)
GLUCOSE BLD STRIP.AUTO-MCNC: 305 MG/DL (ref 65–100)
GLUCOSE SERPL-MCNC: 216 MG/DL (ref 65–100)
HCT VFR BLD AUTO: 33.5 % (ref 41.1–50.3)
HGB BLD-MCNC: 11.4 G/DL (ref 13.6–17.2)
IMM GRANULOCYTES # BLD AUTO: 0 K/UL (ref 0–0.5)
IMM GRANULOCYTES NFR BLD AUTO: 1 % (ref 0–5)
INTERPRETATION: ABNORMAL
LYMPHOCYTES # BLD: 0.4 K/UL (ref 0.5–4.6)
LYMPHOCYTES NFR BLD: 8 % (ref 13–44)
MCH RBC QN AUTO: 32.9 PG (ref 26.1–32.9)
MCHC RBC AUTO-ENTMCNC: 34 G/DL (ref 31.4–35)
MCV RBC AUTO: 96.5 FL (ref 79.6–97.8)
MECA (METHICILLIN-RESISTANCE GENES), MRGP: NOT DETECTED
MONOCYTES # BLD: 0.4 K/UL (ref 0.1–1.3)
MONOCYTES NFR BLD: 9 % (ref 4–12)
NEUTS SEG # BLD: 3.8 K/UL (ref 1.7–8.2)
NEUTS SEG NFR BLD: 82 % (ref 43–78)
NRBC # BLD: 0 K/UL (ref 0–0.2)
PLATELET # BLD AUTO: 100 K/UL (ref 150–450)
PMV BLD AUTO: 11.5 FL (ref 9.4–12.3)
POTASSIUM SERPL-SCNC: 4.4 MMOL/L (ref 3.5–5.1)
PROT SERPL-MCNC: 6.5 G/DL (ref 6.3–8.2)
RBC # BLD AUTO: 3.47 M/UL (ref 4.23–5.6)
SODIUM SERPL-SCNC: 134 MMOL/L (ref 136–145)
SPECIMEN EXP DATE BLD: NORMAL
STAPHYLOCOCCUS, STAPP: DETECTED
STREPTOCOCCUS , STPSP: DETECTED
WBC # BLD AUTO: 4.6 K/UL (ref 4.3–11.1)

## 2021-01-01 PROCEDURE — 82962 GLUCOSE BLOOD TEST: CPT

## 2021-01-01 PROCEDURE — 74011250636 HC RX REV CODE- 250/636: Performed by: INTERNAL MEDICINE

## 2021-01-01 PROCEDURE — 77010033678 HC OXYGEN DAILY

## 2021-01-01 PROCEDURE — 2709999900 HC NON-CHARGEABLE SUPPLY

## 2021-01-01 PROCEDURE — 74011636637 HC RX REV CODE- 636/637: Performed by: INTERNAL MEDICINE

## 2021-01-01 PROCEDURE — 65270000029 HC RM PRIVATE

## 2021-01-01 PROCEDURE — 85025 COMPLETE CBC W/AUTO DIFF WBC: CPT

## 2021-01-01 PROCEDURE — 74011000258 HC RX REV CODE- 258: Performed by: INTERNAL MEDICINE

## 2021-01-01 PROCEDURE — 94760 N-INVAS EAR/PLS OXIMETRY 1: CPT

## 2021-01-01 PROCEDURE — 94640 AIRWAY INHALATION TREATMENT: CPT

## 2021-01-01 PROCEDURE — 74011250637 HC RX REV CODE- 250/637: Performed by: INTERNAL MEDICINE

## 2021-01-01 PROCEDURE — 80053 COMPREHEN METABOLIC PANEL: CPT

## 2021-01-01 RX ADMIN — HEPARIN SODIUM 5000 UNITS: 5000 INJECTION INTRAVENOUS; SUBCUTANEOUS at 17:13

## 2021-01-01 RX ADMIN — Medication 10 ML: at 20:13

## 2021-01-01 RX ADMIN — AMLODIPINE BESYLATE: 10 TABLET ORAL at 08:04

## 2021-01-01 RX ADMIN — ROSUVASTATIN 10 MG: 10 TABLET, FILM COATED ORAL at 20:13

## 2021-01-01 RX ADMIN — INSULIN LISPRO 4 UNITS: 100 INJECTION, SOLUTION INTRAVENOUS; SUBCUTANEOUS at 06:18

## 2021-01-01 RX ADMIN — INSULIN LISPRO 6 UNITS: 100 INJECTION, SOLUTION INTRAVENOUS; SUBCUTANEOUS at 11:18

## 2021-01-01 RX ADMIN — CLOPIDOGREL BISULFATE 75 MG: 75 TABLET ORAL at 08:04

## 2021-01-01 RX ADMIN — HEPARIN SODIUM 5000 UNITS: 5000 INJECTION INTRAVENOUS; SUBCUTANEOUS at 01:00

## 2021-01-01 RX ADMIN — HYDROCHLOROTHIAZIDE 12.5 MG: 25 TABLET ORAL at 08:05

## 2021-01-01 RX ADMIN — LORATADINE 5 MG: 10 TABLET ORAL at 08:05

## 2021-01-01 RX ADMIN — BUDESONIDE AND FORMOTEROL FUMARATE DIHYDRATE 2 PUFF: 160; 4.5 AEROSOL RESPIRATORY (INHALATION) at 08:00

## 2021-01-01 RX ADMIN — INSULIN LISPRO 8 UNITS: 100 INJECTION, SOLUTION INTRAVENOUS; SUBCUTANEOUS at 17:13

## 2021-01-01 RX ADMIN — Medication 10 ML: at 14:15

## 2021-01-01 RX ADMIN — HEPARIN SODIUM 5000 UNITS: 5000 INJECTION INTRAVENOUS; SUBCUTANEOUS at 08:04

## 2021-01-01 RX ADMIN — ASPIRIN 81 MG: 81 TABLET, COATED ORAL at 20:13

## 2021-01-01 RX ADMIN — INSULIN LISPRO 6 UNITS: 100 INJECTION, SOLUTION INTRAVENOUS; SUBCUTANEOUS at 20:48

## 2021-01-01 RX ADMIN — BUDESONIDE AND FORMOTEROL FUMARATE DIHYDRATE 2 PUFF: 160; 4.5 AEROSOL RESPIRATORY (INHALATION) at 20:53

## 2021-01-01 RX ADMIN — Medication 10 ML: at 06:19

## 2021-01-01 RX ADMIN — DOXYCYCLINE 100 MG: 100 INJECTION, POWDER, LYOPHILIZED, FOR SOLUTION INTRAVENOUS at 20:13

## 2021-01-01 RX ADMIN — CEFTRIAXONE SODIUM 1 G: 1 INJECTION, POWDER, FOR SOLUTION INTRAMUSCULAR; INTRAVENOUS at 00:59

## 2021-01-01 RX ADMIN — DOXYCYCLINE 100 MG: 100 INJECTION, POWDER, LYOPHILIZED, FOR SOLUTION INTRAVENOUS at 08:06

## 2021-01-01 RX ADMIN — DEXAMETHASONE 6 MG: 4 TABLET ORAL at 08:06

## 2021-01-01 RX ADMIN — MONTELUKAST SODIUM 10 MG: 10 TABLET, FILM COATED ORAL at 20:13

## 2021-01-01 RX ADMIN — MAGNESIUM GLUCONATE 500 MG ORAL TABLET 400 MG: 500 TABLET ORAL at 08:04

## 2021-01-01 NOTE — PROGRESS NOTES
Patient has decided that he would like to wait on getting the Plasma and Remdesivir. He has several concerns and would like to wait and see if he gets better on his own. MD aware. Will pass along the message to day shift.

## 2021-01-01 NOTE — PROGRESS NOTES
Patient remains stable. No S/Sx of distress at this point. Respirations even and unlabored. Call light within reach. Preparing to give report to oncoming shift.

## 2021-01-01 NOTE — PROGRESS NOTES
Critical Lab! Patient has a positive blood culture that was drawn yesterday @539 am. Micro has identified it as gram + cocci staph and strep. MD aware. New orders received.

## 2021-01-01 NOTE — PROGRESS NOTES
KATINA traylor from [facility], [address] [place] [address]. Patient in stable condition at this time. Safety measures in place. No S/Sx of distress at this time. Respirations even and unlabored.   Call light within reach and patient encouraged to call nurse prn assist.

## 2021-01-01 NOTE — PROGRESS NOTES
Night shift note:    Notified of positive blood cultures from admission growing MSSA and Strep. Patient already on Rocephin which should cover both. Will order repeat blood cultures x2 with AM labs for tomorrow.

## 2021-01-01 NOTE — PROGRESS NOTES
Hospitalist Progress Note    Patient: Kg Long MRN: [id number]  SSN: [id number]    YOB: 1945  Age: 67 y.o. Sex: male      Admit Date: 12/31/2020    LOS: 1 day     Subjective:     68 yo CM with past history of CAD s/p stents, ischemic cardiomyopathy, PAD (on Plavix and cilostazo), COPD, DM type II, HTN, HLD presents via EMS after fall at home and was admitted due to acute respiratory failure due to COVID and had an episode of VTach. 1/1 - Feels mildly SOB. No cough. No CP. Denies N/V. No diarrhea since admission. Review of systems negative except stated above. Objective:     Visit Vitals  /68 (BP 1 Location: Left arm, BP Patient Position: Sitting)   Pulse 90   Temp 98.4 °F (36.9 °C)   Resp 22   Ht 6' 1\" (1.854 m)   Wt 131.1 kg (289 lb)   SpO2 99%   BMI 38.13 kg/m²      Oxygen Therapy  O2 Sat (%): 99 % (01/01/21 1103)  Pulse via Oximetry: 80 beats per minute (01/01/21 0800)  O2 Device: Nasal cannula (01/01/21 1103)  O2 Flow Rate (L/min): 3 l/min (01/01/21 1103)      Intake and Output:     Intake/Output Summary (Last 24 hours) at 1/1/2021 1244  Last data filed at 1/1/2021 0935  Gross per 24 hour   Intake 600 ml   Output    Net 600 ml         Physical Exam:   GENERAL: alert, cooperative, no distress, appears stated age  EYE: conjunctivae/corneas clear. PERRL. THROAT & NECK: normal and no erythema or exudates noted. LUNG: clear to auscultation bilaterally  HEART: regular rate and rhythm, S1S2, no murmur, no JVD  ABDOMEN: soft, non-tender, non-distended. Bowel sounds normal.   EXTREMITIES:  No edema, 2+ pedal/radial pulses bilaterally  SKIN: no rash or abnormalities  NEUROLOGIC: A&Ox3. Cranial nerves 2-12 grossly intact.     Lab/Data Review:  Recent Results (from the past 24 hour(s))   GLUCOSE, POC    Collection Time: 12/31/20  4:08 PM   Result Value Ref Range    Glucose (POC) 157 (H) 65 - 100 mg/dL   TYPE & SCREEN    Collection Time: 12/31/20  8:22 PM   Result Value Ref Range    Crossmatch Expiration 01/03/2021,2359     ABO/Rh(D) A POSITIVE     Antibody screen NEG    PROTHROMBIN TIME + INR    Collection Time: 12/31/20  8:23 PM   Result Value Ref Range    Prothrombin time 14.7 12.5 - 14.7 sec    INR 1.1     D DIMER    Collection Time: 12/31/20  8:23 PM   Result Value Ref Range    D DIMER 0.52 <0.56 ug/ml(FEU)   FERRITIN    Collection Time: 12/31/20  8:23 PM   Result Value Ref Range    Ferritin 305 8 - 388 NG/ML   C REACTIVE PROTEIN, QT    Collection Time: 12/31/20  8:23 PM   Result Value Ref Range    C-Reactive protein 5.9 (H) 0.0 - 0.9 mg/dL   VITAMIN D, 25 HYDROXY    Collection Time: 12/31/20  8:23 PM   Result Value Ref Range    Vitamin D 25-Hydroxy 32.2 30.0 - 100.0 ng/mL   TROPONIN-HIGH SENSITIVITY    Collection Time: 12/31/20  8:23 PM   Result Value Ref Range    Troponin-High Sensitivity 391.3 (HH) 0 - 14 pg/mL   GLUCOSE, POC    Collection Time: 12/31/20  8:24 PM   Result Value Ref Range    Glucose (POC) 212 (H) 65 - 100 mg/dL   CBC WITH AUTOMATED DIFF    Collection Time: 01/01/21  3:29 AM   Result Value Ref Range    WBC 4.6 4.3 - 11.1 K/uL    RBC 3.47 (L) 4.23 - 5.6 M/uL    HGB 11.4 (L) 13.6 - 17.2 g/dL    HCT 33.5 (L) 41.1 - 50.3 %    MCV 96.5 79.6 - 97.8 FL    MCH 32.9 26.1 - 32.9 PG    MCHC 34.0 31.4 - 35.0 g/dL    RDW 14.6 11.9 - 14.6 %    PLATELET 982 (L) 482 - 450 K/uL    MPV 11.5 9.4 - 12.3 FL    ABSOLUTE NRBC 0.00 0.0 - 0.2 K/uL    DF AUTOMATED      NEUTROPHILS 82 (H) 43 - 78 %    LYMPHOCYTES 8 (L) 13 - 44 %    MONOCYTES 9 4.0 - 12.0 %    EOSINOPHILS 0 (L) 0.5 - 7.8 %    BASOPHILS 0 0.0 - 2.0 %    IMMATURE GRANULOCYTES 1 0.0 - 5.0 %    ABS. NEUTROPHILS 3.8 1.7 - 8.2 K/UL    ABS. LYMPHOCYTES 0.4 (L) 0.5 - 4.6 K/UL    ABS. MONOCYTES 0.4 0.1 - 1.3 K/UL    ABS. EOSINOPHILS 0.0 0.0 - 0.8 K/UL    ABS. BASOPHILS 0.0 0.0 - 0.2 K/UL    ABS. IMM.  GRANS. 0.0 0.0 - 0.5 K/UL   METABOLIC PANEL, COMPREHENSIVE    Collection Time: 01/01/21  3:29 AM   Result Value Ref Range    Sodium 134 (L) 136 - 145 mmol/L    Potassium 4.4 3.5 - 5.1 mmol/L    Chloride 104 98 - 107 mmol/L    CO2 23 21 - 32 mmol/L    Anion gap 7 7 - 16 mmol/L    Glucose 216 (H) 65 - 100 mg/dL    BUN 17 8 - 23 MG/DL    Creatinine 1.12 0.8 - 1.5 MG/DL    GFR est AA >60 >60 ml/min/1.73m2    GFR est non-AA >60 >60 ml/min/1.73m2    Calcium 8.0 (L) 8.3 - 10.4 MG/DL    Bilirubin, total 0.4 0.2 - 1.1 MG/DL    ALT (SGPT) 71 (H) 12 - 65 U/L    AST (SGOT) 131 (H) 15 - 37 U/L    Alk. phosphatase 62 50 - 136 U/L    Protein, total 6.5 6.3 - 8.2 g/dL    Albumin 3.2 3.2 - 4.6 g/dL    Globulin 3.3 2.3 - 3.5 g/dL    A-G Ratio 1.0 (L) 1.2 - 3.5     GLUCOSE, POC    Collection Time: 01/01/21  6:01 AM   Result Value Ref Range    Glucose (POC) 210 (H) 65 - 100 mg/dL   GLUCOSE, POC    Collection Time: 01/01/21 10:59 AM   Result Value Ref Range    Glucose (POC) 251 (H) 65 - 100 mg/dL       SARS-CoV-2 Lab Results  \"Novel Coronavirus\" Test: No results found for: COV2NT   \"Emergent Disease\" Test: No results found for: EDPR  \"SARS-COV-2\" Test: No results found for: XGCOVT  \"Precision Labs\" Test: No results found for: RSLT  Rapid Test:   Lab Results   Component Value Date/Time    COVR Detected (AA) 12/31/2020 05:49 AM           Imaging:  Xr Chest Port    Result Date: 12/31/2020  EXAM: Chest x-ray. INDICATION: Dyspnea. COMPARISON: Prior chest x-ray on April 9, 2019. TECHNIQUE: 2 frontal views of the chest were obtained. FINDINGS: The lungs are hyperexpanded, consistent with the reported history of COPD. There is new mild infiltrate in the right lung base. The left lung is clear. The heart is borderline enlarged. The mediastinal contour and pulmonary vasculature are normal. No pneumothorax or pleural effusion is seen. There are degenerative changes in the spine. IMPRESSION: 1. Mild right lung base infiltrate, suspect for acute pneumonia. 2. COPD. No results found for this visit on 12/31/20. Cultures:   All Micro Results     Procedure Component Value Units Date/Time    CULTURE, BLOOD [[id number]] Collected: 12/31/20 0309    Order Status: Completed Specimen: Blood Updated: 01/01/21 1059     Special Requests: --        RIGHT  HAND       Culture result: NO GROWTH 1 DAY       BLOOD CULTURE ID PANEL [[id number]]  (Abnormal) Collected: 12/31/20 0549    Order Status: Completed Specimen: Blood Updated: 01/01/21 0801     Acc. no. from Micro Order [id number]     Staphylococcus Detected        Comment: RESULTS VERIFIED, PHONED TO AND READ BACK BY  GALLITO DSOUZA @ 0451 1/1/21 MM          Streptococcus Detected        Comment: RESULTS VERIFIED, PHONED TO AND READ BACK BY  GALLITO DSOUZA @ 0451 1/1/21 MM          mecA (Methicillin-Resistance Genes) NOT DETECTED        INTERPRETATION       Multiple organisms detected. Results do not replace susceptibility testing. CULTURE, BLOOD [[id number]] Collected: 12/31/20 0549    Order Status: Completed Specimen: Blood Updated: 01/01/21 0506     Special Requests: --        NO SPECIAL REQUESTS  RIGHT  FOREARM       GRAM STAIN GRAM POSITIVE COCCI         AEROBIC BOTTLE POSITIVE               RESULTS VERIFIED, PHONED TO AND READ BACK BY GALLITO DSOUZA @ 0451 1/1/21 MM           Culture result:       CULTURE IN PROGRESS,FURTHER UPDATES TO FOLLOW            REFER TO Giovanny Flores [id number]          Assessment/Plan:     Principal Problem:    Acute respiratory failure with hypoxia ([facility] [address] (10/23/2014)  - Due to COVID + pneumonia  - Start Decadron  - Start Remdesivir  - Continue home aerosols  - Continue Ceftriaxone + Doxy  - Wean oxygen as appropriate    Active Problems:    Monomorphic ventricular tachycardia ([address] (12/31/2020)  - Patient found to have monomorphic VT with EMS, converted on his own  - Given Mag in ER  - Cardiology saw him in ER and recommended correcting electrolytes  - Start Amio if it happens again  - Cardiology signed out, didn't write a note?       COVID-19 (12/31/2020)  - COVID positive 12/30  - Now hypoxic  - Continue Decadron 6mg daily x 10 days  - Continue Remdesivir  - S/P convalescent plasma  - Vitamin D 32      Bacteremia due to gram positive bacteria (1/1/2021)  - Initial blood cultures with strep + staph?  - Continue Ceftriaxone + Doxy since improving  - Repeat in AM  - If persistent, will need ECHO      AGUSTIN (acute kidney injury) ([facility] [address] (9/15/2014)  - Resolved      CAP (community acquired pneumonia) (12/31/2020)  - CXR with RLL infiltrate  - Continue Ceftriaxone + Doxy  - Monitor labs and vitals      Elevated troponin (12/31/2020)  - Troponin 72 --> 425 --> 391  - No acute CP  - ?COVID related vs VT  - Repeat troponin until trending down  - Continue ASA + Plavix  - Cardiology assess in ER(?) --> spoke to them and they think it's COVID      DM (diabetes mellitus) type II, controlled, with peripheral vascular disorder ([facility] [address] (9/11/2014)  - A1C on 12/16/20 was 6.8  - Continue Humalog SSI      Coronary artery disease involving native coronary artery of native heart without angina pectoris (10/12/2011)  - No acute CP, but trop elevated  - Continue ASA + Plavix  - Continue Crestor      COPD (chronic obstructive pulmonary disease) ([facility] [address] (4/10/2015)  - No acute wheezing  - Continue home aerosols      PAD (peripheral artery disease) ([facility] [address] (11/10/2015)  - Continue ASA + Plavix      Morbid obesity ([facility] [address] (10/12/2011)      Hyperlipidemia (11/10/2015)  - Continue Crestor      Today's Plan: Contine Decadron + Remdesivir. Continue Ceftriaxone + Doxy.     DIET DIABETIC CONSISTENT CARB Regular    DVT Prophylaxis: Heparin    Discharge Plan: TBD      Signed By: Lakhwinder Burrell DO     January 1, 2021

## 2021-01-01 NOTE — PROGRESS NOTES
Pt continues to refuse remdesivir at this time stating he feel good and would like to see if he feels better without it.

## 2021-01-02 LAB
ALBUMIN SERPL-MCNC: 3.4 G/DL (ref 3.2–4.6)
ALBUMIN/GLOB SERPL: 0.9 {RATIO} (ref 1.2–3.5)
ALP SERPL-CCNC: 58 U/L (ref 50–136)
ALT SERPL-CCNC: 67 U/L (ref 12–65)
ANION GAP SERPL CALC-SCNC: 5 MMOL/L (ref 7–16)
ARTERIAL PATENCY WRIST A: YES
AST SERPL-CCNC: 93 U/L (ref 15–37)
BASE DEFICIT BLD-SCNC: 3 MMOL/L
BASOPHILS # BLD: 0 K/UL (ref 0–0.2)
BASOPHILS NFR BLD: 0 % (ref 0–2)
BDY SITE: ABNORMAL
BILIRUB SERPL-MCNC: 0.4 MG/DL (ref 0.2–1.1)
BUN SERPL-MCNC: 24 MG/DL (ref 8–23)
CALCIUM SERPL-MCNC: 8.3 MG/DL (ref 8.3–10.4)
CHLORIDE SERPL-SCNC: 104 MMOL/L (ref 98–107)
CO2 BLD-SCNC: 23 MMOL/L
CO2 SERPL-SCNC: 26 MMOL/L (ref 21–32)
COLLECT TIME,HTIME: 1130
CREAT SERPL-MCNC: 1.22 MG/DL (ref 0.8–1.5)
DIFFERENTIAL METHOD BLD: ABNORMAL
EOSINOPHIL # BLD: 0 K/UL (ref 0–0.8)
EOSINOPHIL NFR BLD: 0 % (ref 0.5–7.8)
ERYTHROCYTE [DISTWIDTH] IN BLOOD BY AUTOMATED COUNT: 14.6 % (ref 11.9–14.6)
FLOW RATE ISTAT,IFRATE: 5 L/MIN
GAS FLOW.O2 O2 DELIVERY SYS: ABNORMAL L/MIN
GLOBULIN SER CALC-MCNC: 3.6 G/DL (ref 2.3–3.5)
GLUCOSE BLD STRIP.AUTO-MCNC: 201 MG/DL (ref 65–100)
GLUCOSE BLD STRIP.AUTO-MCNC: 208 MG/DL (ref 65–100)
GLUCOSE BLD STRIP.AUTO-MCNC: 236 MG/DL (ref 65–100)
GLUCOSE BLD STRIP.AUTO-MCNC: 250 MG/DL (ref 65–100)
GLUCOSE SERPL-MCNC: 161 MG/DL (ref 65–100)
HCO3 BLD-SCNC: 21.7 MMOL/L (ref 22–26)
HCT VFR BLD AUTO: 33.9 % (ref 41.1–50.3)
HGB BLD-MCNC: 11.5 G/DL (ref 13.6–17.2)
IMM GRANULOCYTES # BLD AUTO: 0 K/UL (ref 0–0.5)
IMM GRANULOCYTES NFR BLD AUTO: 1 % (ref 0–5)
LYMPHOCYTES # BLD: 0.6 K/UL (ref 0.5–4.6)
LYMPHOCYTES NFR BLD: 11 % (ref 13–44)
MCH RBC QN AUTO: 32.9 PG (ref 26.1–32.9)
MCHC RBC AUTO-ENTMCNC: 33.9 G/DL (ref 31.4–35)
MCV RBC AUTO: 96.9 FL (ref 79.6–97.8)
MONOCYTES # BLD: 0.6 K/UL (ref 0.1–1.3)
MONOCYTES NFR BLD: 11 % (ref 4–12)
NEUTS SEG # BLD: 3.7 K/UL (ref 1.7–8.2)
NEUTS SEG NFR BLD: 76 % (ref 43–78)
NRBC # BLD: 0 K/UL (ref 0–0.2)
PCO2 BLD: 35.8 MMHG (ref 35–45)
PH BLD: 7.39 [PH] (ref 7.35–7.45)
PLATELET # BLD AUTO: 115 K/UL (ref 150–450)
PMV BLD AUTO: 11.6 FL (ref 9.4–12.3)
PO2 BLD: 55 MMHG (ref 75–100)
POTASSIUM SERPL-SCNC: 4.4 MMOL/L (ref 3.5–5.1)
PROT SERPL-MCNC: 7 G/DL (ref 6.3–8.2)
RBC # BLD AUTO: 3.5 M/UL (ref 4.23–5.6)
SAO2 % BLD: 88 % (ref 95–98)
SERVICE CMNT-IMP: ABNORMAL
SERVICE CMNT-IMP: ABNORMAL
SODIUM SERPL-SCNC: 135 MMOL/L (ref 136–145)
SPECIMEN TYPE: ABNORMAL
WBC # BLD AUTO: 4.9 K/UL (ref 4.3–11.1)

## 2021-01-02 PROCEDURE — 94760 N-INVAS EAR/PLS OXIMETRY 1: CPT

## 2021-01-02 PROCEDURE — 85025 COMPLETE CBC W/AUTO DIFF WBC: CPT

## 2021-01-02 PROCEDURE — 82962 GLUCOSE BLOOD TEST: CPT

## 2021-01-02 PROCEDURE — 74011000258 HC RX REV CODE- 258: Performed by: INTERNAL MEDICINE

## 2021-01-02 PROCEDURE — 74011250637 HC RX REV CODE- 250/637: Performed by: INTERNAL MEDICINE

## 2021-01-02 PROCEDURE — 36600 WITHDRAWAL OF ARTERIAL BLOOD: CPT

## 2021-01-02 PROCEDURE — 94640 AIRWAY INHALATION TREATMENT: CPT

## 2021-01-02 PROCEDURE — 2709999900 HC NON-CHARGEABLE SUPPLY

## 2021-01-02 PROCEDURE — 80053 COMPREHEN METABOLIC PANEL: CPT

## 2021-01-02 PROCEDURE — 74011250636 HC RX REV CODE- 250/636: Performed by: INTERNAL MEDICINE

## 2021-01-02 PROCEDURE — 77010033678 HC OXYGEN DAILY

## 2021-01-02 PROCEDURE — 74011636637 HC RX REV CODE- 636/637: Performed by: INTERNAL MEDICINE

## 2021-01-02 PROCEDURE — 36415 COLL VENOUS BLD VENIPUNCTURE: CPT

## 2021-01-02 PROCEDURE — 65270000029 HC RM PRIVATE

## 2021-01-02 PROCEDURE — 77010033711 HC HIGH FLOW OXYGEN

## 2021-01-02 PROCEDURE — 82803 BLOOD GASES ANY COMBINATION: CPT

## 2021-01-02 PROCEDURE — 87040 BLOOD CULTURE FOR BACTERIA: CPT

## 2021-01-02 RX ORDER — ALBUTEROL SULFATE 90 UG/1
2 AEROSOL, METERED RESPIRATORY (INHALATION)
Status: DISCONTINUED | OUTPATIENT
Start: 2021-01-02 | End: 2021-01-15

## 2021-01-02 RX ADMIN — HEPARIN SODIUM 5000 UNITS: 5000 INJECTION INTRAVENOUS; SUBCUTANEOUS at 16:28

## 2021-01-02 RX ADMIN — ASPIRIN 81 MG: 81 TABLET, COATED ORAL at 22:23

## 2021-01-02 RX ADMIN — INSULIN LISPRO 4 UNITS: 100 INJECTION, SOLUTION INTRAVENOUS; SUBCUTANEOUS at 12:12

## 2021-01-02 RX ADMIN — Medication 10 ML: at 05:30

## 2021-01-02 RX ADMIN — MAGNESIUM GLUCONATE 500 MG ORAL TABLET 400 MG: 500 TABLET ORAL at 08:38

## 2021-01-02 RX ADMIN — BUDESONIDE AND FORMOTEROL FUMARATE DIHYDRATE 2 PUFF: 160; 4.5 AEROSOL RESPIRATORY (INHALATION) at 07:52

## 2021-01-02 RX ADMIN — DEXAMETHASONE 6 MG: 4 TABLET ORAL at 08:38

## 2021-01-02 RX ADMIN — BUDESONIDE AND FORMOTEROL FUMARATE DIHYDRATE 2 PUFF: 160; 4.5 AEROSOL RESPIRATORY (INHALATION) at 20:26

## 2021-01-02 RX ADMIN — CLOPIDOGREL BISULFATE 75 MG: 75 TABLET ORAL at 08:38

## 2021-01-02 RX ADMIN — ROSUVASTATIN 10 MG: 10 TABLET, FILM COATED ORAL at 22:22

## 2021-01-02 RX ADMIN — HEPARIN SODIUM 5000 UNITS: 5000 INJECTION INTRAVENOUS; SUBCUTANEOUS at 00:04

## 2021-01-02 RX ADMIN — DOXYCYCLINE 100 MG: 100 INJECTION, POWDER, LYOPHILIZED, FOR SOLUTION INTRAVENOUS at 22:22

## 2021-01-02 RX ADMIN — MONTELUKAST SODIUM 10 MG: 10 TABLET, FILM COATED ORAL at 22:22

## 2021-01-02 RX ADMIN — ALBUTEROL SULFATE 2 PUFF: 108 INHALANT RESPIRATORY (INHALATION) at 14:11

## 2021-01-02 RX ADMIN — ALBUTEROL SULFATE 2 PUFF: 108 INHALANT RESPIRATORY (INHALATION) at 20:26

## 2021-01-02 RX ADMIN — HYDROCHLOROTHIAZIDE 12.5 MG: 25 TABLET ORAL at 08:37

## 2021-01-02 RX ADMIN — HEPARIN SODIUM 5000 UNITS: 5000 INJECTION INTRAVENOUS; SUBCUTANEOUS at 08:37

## 2021-01-02 RX ADMIN — INSULIN LISPRO 6 UNITS: 100 INJECTION, SOLUTION INTRAVENOUS; SUBCUTANEOUS at 16:40

## 2021-01-02 RX ADMIN — AMLODIPINE BESYLATE: 10 TABLET ORAL at 08:37

## 2021-01-02 RX ADMIN — INSULIN LISPRO 4 UNITS: 100 INJECTION, SOLUTION INTRAVENOUS; SUBCUTANEOUS at 06:08

## 2021-01-02 RX ADMIN — DOXYCYCLINE 100 MG: 100 INJECTION, POWDER, LYOPHILIZED, FOR SOLUTION INTRAVENOUS at 08:36

## 2021-01-02 RX ADMIN — INSULIN LISPRO 4 UNITS: 100 INJECTION, SOLUTION INTRAVENOUS; SUBCUTANEOUS at 21:34

## 2021-01-02 RX ADMIN — CEFTRIAXONE SODIUM 1 G: 1 INJECTION, POWDER, FOR SOLUTION INTRAMUSCULAR; INTRAVENOUS at 00:04

## 2021-01-02 RX ADMIN — LORATADINE 5 MG: 10 TABLET ORAL at 08:37

## 2021-01-02 RX ADMIN — Medication 10 ML: at 22:22

## 2021-01-02 RX ADMIN — ALBUTEROL SULFATE 2 PUFF: 108 INHALANT RESPIRATORY (INHALATION) at 10:03

## 2021-01-02 RX ADMIN — Medication 10 ML: at 13:00

## 2021-01-02 NOTE — PROGRESS NOTES
Hospitalist Progress Note    Patient: Audelia Schmid MRN: [id number]  SSN: [id number]    YOB: 1945  Age: 67 y.o. Sex: male      Admit Date: 12/31/2020    LOS: 2 days     Subjective:     66 yo CM with past history of CAD s/p stents, ischemic cardiomyopathy, PAD (on Plavix and cilostazo), COPD, DM type II, HTN, HLD presents via EMS after fall at home and was admitted due to acute respiratory failure due to COVID and had an episode of VTach. 1/1 - Apparently became confused and agitated. In restraints now. Feels more SOB. No cough. No CP. Denies N/V. No diarrhea since admission. Review of systems negative except stated above. Objective:     Visit Vitals  /74   Pulse 96   Temp (P) 99.5 °F (37.5 °C)   Resp 21   Ht 6' 1\" (1.854 m)   Wt 131.1 kg (289 lb)   SpO2 96%   BMI 38.13 kg/m²      Oxygen Therapy  O2 Sat (%): 96 % (01/02/21 1004)  Pulse via Oximetry: 96 beats per minute (01/02/21 1004)  O2 Device: Nasal cannula (01/02/21 1004)  O2 Flow Rate (L/min): 4 l/min (01/02/21 1004)      Intake and Output:     Intake/Output Summary (Last 24 hours) at 1/2/2021 1149  Last data filed at 1/2/2021 0653  Gross per 24 hour   Intake 480 ml   Output 200 ml   Net 280 ml         Physical Exam:   GENERAL: alert, cooperative, no distress, appears stated age  EYE: conjunctivae/corneas clear. PERRL. THROAT & NECK: normal and no erythema or exudates noted. LUNG: clear to auscultation bilaterally  HEART: regular rate and rhythm, S1S2, no murmur, no JVD  ABDOMEN: soft, non-tender, non-distended. Bowel sounds normal.   EXTREMITIES:  No edema, 2+ pedal/radial pulses bilaterally  SKIN: no rash or abnormalities  NEUROLOGIC: A&Ox2-3, not agitated with me. Cranial nerves 2-12 grossly intact.     Lab/Data Review:  Recent Results (from the past 24 hour(s))   GLUCOSE, POC    Collection Time: 01/01/21  3:49 PM   Result Value Ref Range    Glucose (POC) 305 (H) 65 - 100 mg/dL   GLUCOSE, POC    Collection Time: 01/01/21  8:47 PM   Result Value Ref Range    Glucose (POC) 270 (H) 65 - 100 mg/dL   GLUCOSE, POC    Collection Time: 01/02/21  5:51 AM   Result Value Ref Range    Glucose (POC) 201 (H) 65 - 100 mg/dL   CBC WITH AUTOMATED DIFF    Collection Time: 01/02/21  8:43 AM   Result Value Ref Range    WBC 4.9 4.3 - 11.1 K/uL    RBC 3.50 (L) 4.23 - 5.6 M/uL    HGB 11.5 (L) 13.6 - 17.2 g/dL    HCT 33.9 (L) 41.1 - 50.3 %    MCV 96.9 79.6 - 97.8 FL    MCH 32.9 26.1 - 32.9 PG    MCHC 33.9 31.4 - 35.0 g/dL    RDW 14.6 11.9 - 14.6 %    PLATELET 666 (L) 138 - 450 K/uL    MPV 11.6 9.4 - 12.3 FL    ABSOLUTE NRBC 0.00 0.0 - 0.2 K/uL    DF AUTOMATED      NEUTROPHILS 76 43 - 78 %    LYMPHOCYTES 11 (L) 13 - 44 %    MONOCYTES 11 4.0 - 12.0 %    EOSINOPHILS 0 (L) 0.5 - 7.8 %    BASOPHILS 0 0.0 - 2.0 %    IMMATURE GRANULOCYTES 1 0.0 - 5.0 %    ABS. NEUTROPHILS 3.7 1.7 - 8.2 K/UL    ABS. LYMPHOCYTES 0.6 0.5 - 4.6 K/UL    ABS. MONOCYTES 0.6 0.1 - 1.3 K/UL    ABS. EOSINOPHILS 0.0 0.0 - 0.8 K/UL    ABS. BASOPHILS 0.0 0.0 - 0.2 K/UL    ABS. IMM. GRANS. 0.0 0.0 - 0.5 K/UL   METABOLIC PANEL, COMPREHENSIVE    Collection Time: 01/02/21  8:43 AM   Result Value Ref Range    Sodium 135 (L) 136 - 145 mmol/L    Potassium 4.4 3.5 - 5.1 mmol/L    Chloride 104 98 - 107 mmol/L    CO2 26 21 - 32 mmol/L    Anion gap 5 (L) 7 - 16 mmol/L    Glucose 161 (H) 65 - 100 mg/dL    BUN 24 (H) 8 - 23 MG/DL    Creatinine 1.22 0.8 - 1.5 MG/DL    GFR est AA >60 >60 ml/min/1.73m2    GFR est non-AA >60 >60 ml/min/1.73m2    Calcium 8.3 8.3 - 10.4 MG/DL    Bilirubin, total 0.4 0.2 - 1.1 MG/DL    ALT (SGPT) 67 (H) 12 - 65 U/L    AST (SGOT) 93 (H) 15 - 37 U/L    Alk.  phosphatase 58 50 - 136 U/L    Protein, total 7.0 6.3 - 8.2 g/dL    Albumin 3.4 3.2 - 4.6 g/dL    Globulin 3.6 (H) 2.3 - 3.5 g/dL    A-G Ratio 0.9 (L) 1.2 - 3.5     POC G3    Collection Time: 01/02/21 11:37 AM   Result Value Ref Range    Device: NASAL CANNULA      pH (POC) 7.39 7.35 - 7.45      pCO2 (POC) 35.8 35 - 45 MMHG    pO2 (POC) 55 (L) 75 - 100 MMHG    HCO3 (POC) 21.7 (L) 22 - 26 MMOL/L    sO2 (POC) 88 (L) 95 - 98 %    Base deficit (POC) 3 mmol/L    Allens test (POC) YES      Site RIGHT RADIAL      Specimen type (POC) ARTERIAL      Performed by Kerry     CO2, POC 23 MMOL/L    Flow rate (POC) 5.000 L/min    Critical value read back 00:01     COLLECT TIME 1,130         SARS-CoV-2 Lab Results  \"Novel Coronavirus\" Test: No results found for: COV2NT   \"Emergent Disease\" Test: No results found for: EDPR  \"SARS-COV-2\" Test: No results found for: XGCOVT  \"Precision Labs\" Test: No results found for: RSLT  Rapid Test:   Lab Results   Component Value Date/Time    COVR Detected (AA) 12/31/2020 05:49 AM           Imaging:  Xr Chest Port    Result Date: 12/31/2020  EXAM: Chest x-ray. INDICATION: Dyspnea. COMPARISON: Prior chest x-ray on April 9, 2019. TECHNIQUE: 2 frontal views of the chest were obtained. FINDINGS: The lungs are hyperexpanded, consistent with the reported history of COPD. There is new mild infiltrate in the right lung base. The left lung is clear. The heart is borderline enlarged. The mediastinal contour and pulmonary vasculature are normal. No pneumothorax or pleural effusion is seen. There are degenerative changes in the spine. IMPRESSION: 1. Mild right lung base infiltrate, suspect for acute pneumonia. 2. COPD. No results found for this visit on 12/31/20. Cultures:   All Micro Results     Procedure Component Value Units Date/Time    CULTURE, BLOOD [[id number]] Collected: 01/02/21 0848    Order Status: Completed Specimen: Blood Updated: 01/02/21 0934    CULTURE, BLOOD [[id number]] Collected: 01/02/21 0843    Order Status: Completed Specimen: Blood Updated: 01/02/21 0934    CULTURE, BLOOD [[id number]] Collected: 12/31/20 0309    Order Status: Completed Specimen: Blood Updated: 01/02/21 0923     Special Requests: --        RIGHT  HAND       Culture result: NO GROWTH 2 DAYS       CULTURE, BLOOD [[id number]]  (Abnormal) Collected: 12/31/20 0549    Order Status: Completed Specimen: Blood Updated: 01/02/21 0819     Special Requests: --        NO SPECIAL REQUESTS  RIGHT  FOREARM       GRAM STAIN GRAM POSITIVE COCCI         AEROBIC BOTTLE POSITIVE               RESULTS VERIFIED, PHONED TO AND READ BACK BY GALLITO DSOUZA @ 0451 1/1/21 MM           Culture result:       ALPHA STREPTOCOCCUS SUBCULTURE IN PROGRESS                  STAPHYLOCOCCUS SPECIES SUBCULTURE IN PROGRESS            REFER TO Aris Flores Dr [id number]    BLOOD CULTURE ID PANEL [[id number]]  (Abnormal) Collected: 12/31/20 0549    Order Status: Completed Specimen: Blood Updated: 01/01/21 0801     Acc. no. from Micro Order [id number]     Staphylococcus Detected        Comment: RESULTS VERIFIED, PHONED TO AND READ BACK BY  GALLITO DSOUZA @ 0451 1/1/21 MM          Streptococcus Detected        Comment: RESULTS VERIFIED, PHONED TO AND READ BACK BY  GALLITO DSOUZA @ 0451 1/1/21 MM          mecA (Methicillin-Resistance Genes) NOT DETECTED        INTERPRETATION       Multiple organisms detected. Results do not replace susceptibility testing. Assessment/Plan:     Principal Problem:    Acute respiratory failure with hypoxia ([facility] [address] (10/23/2014)  - Due to COVID + pneumonia  - Continue Decadron  - Continue Remdesivir  - Continue home aerosols  - Continue Ceftriaxone + Doxy  - Wean oxygen as appropriate    Active Problems:    Monomorphic ventricular tachycardia ([address] (12/31/2020)  - Patient found to have monomorphic VT with EMS, converted on his own  - Given Mag in ER  - Cardiology saw him in ER and recommended correcting electrolytes  - Start Amio if it happens again  - Cardiology signed out, didn't write a note?       COVID-19 (12/31/2020)  - COVID positive 12/30  - Now hypoxic  - Continue Decadron 6mg daily x 10 days  - Continue Remdesivir  - S/P convalescent plasma  - Vitamin D 32      Bacteremia due to gram positive bacteria (1/1/2021)  - Initial blood cultures with strep + staph?  - Continue Ceftriaxone + Doxy since improving  - Repeat cultures NGTD   - If persistent, will need ECHO      AGUSTIN (acute kidney injury) ([facility] [address] (9/15/2014)  - Resolved      CAP (community acquired pneumonia) (12/31/2020)  - CXR with RLL infiltrate  - Continue Ceftriaxone + Doxy  - Monitor labs and vitals      Elevated troponin (12/31/2020)  - Troponin 72 --> 425 --> 391  - No acute CP  - ?COVID related vs VT  - Repeat troponin until trending down  - Continue ASA + Plavix  - Cardiology assess in ER(?) --> spoke to them and they think it's COVID      DM (diabetes mellitus) type II, controlled, with peripheral vascular disorder ([facility] [address] (9/11/2014)  - A1C on 12/16/20 was 6.8  - Continue Humalog SSI      Coronary artery disease involving native coronary artery of native heart without angina pectoris (10/12/2011)  - No acute CP, but trop elevated  - Continue ASA + Plavix  - Continue Crestor      COPD (chronic obstructive pulmonary disease) ([facility] [address] (4/10/2015)  - No acute wheezing  - Continue home aerosols      PAD (peripheral artery disease) ([facility] [address] (11/10/2015)  - Continue ASA + Plavix      Morbid obesity ([address] (10/12/2011)      Hyperlipidemia (11/10/2015)  - Continue Crestor      Today's Plan: Contine Decadron + Remdesivir. Continue Ceftriaxone + Doxy.     DIET DIABETIC CONSISTENT CARB Regular    DVT Prophylaxis: Heparin    Discharge Plan: TBD      Signed By: Rd Patel DO     January 2, 2021

## 2021-01-02 NOTE — PROGRESS NOTES
SBAR received from [employer] pt in bed resting rr are even wheezing noted. O2 5L NC in place O2 sat is 97%. Denies pain. abd semisoft bowel sounds are active. Skin intact no issues noted. Safety measures in place will continue to monitor.

## 2021-01-02 NOTE — PROGRESS NOTES
Called to patients room because of sat of 87% and patients work of breathing. Patient started on airvo 50L and 70% sat improved to 96%

## 2021-01-02 NOTE — PROGRESS NOTES
Pt having increased wheezing RT called breathing treatment given pt is stable at current time but stated he wanted to be left alone. Will continue to monitor.

## 2021-01-02 NOTE — PROGRESS NOTES
Patient is in recliner at this time. Respirations regular and unlabored. No distress noted at this time. Call light in reach.

## 2021-01-03 LAB
ALBUMIN SERPL-MCNC: 3.1 G/DL (ref 3.2–4.6)
ALBUMIN/GLOB SERPL: 0.8 {RATIO} (ref 1.2–3.5)
ALP SERPL-CCNC: 55 U/L (ref 50–136)
ALT SERPL-CCNC: 59 U/L (ref 12–65)
ANION GAP SERPL CALC-SCNC: 7 MMOL/L (ref 7–16)
AST SERPL-CCNC: 79 U/L (ref 15–37)
BACTERIA SPEC CULT: ABNORMAL
BASOPHILS # BLD: 0 K/UL (ref 0–0.2)
BASOPHILS NFR BLD: 0 % (ref 0–2)
BILIRUB SERPL-MCNC: 0.5 MG/DL (ref 0.2–1.1)
BUN SERPL-MCNC: 33 MG/DL (ref 8–23)
CALCIUM SERPL-MCNC: 8.1 MG/DL (ref 8.3–10.4)
CHLORIDE SERPL-SCNC: 100 MMOL/L (ref 98–107)
CO2 SERPL-SCNC: 25 MMOL/L (ref 21–32)
CREAT SERPL-MCNC: 1.19 MG/DL (ref 0.8–1.5)
DIFFERENTIAL METHOD BLD: ABNORMAL
EOSINOPHIL # BLD: 0 K/UL (ref 0–0.8)
EOSINOPHIL NFR BLD: 0 % (ref 0.5–7.8)
ERYTHROCYTE [DISTWIDTH] IN BLOOD BY AUTOMATED COUNT: 14.9 % (ref 11.9–14.6)
GLOBULIN SER CALC-MCNC: 3.8 G/DL (ref 2.3–3.5)
GLUCOSE BLD STRIP.AUTO-MCNC: 171 MG/DL (ref 65–100)
GLUCOSE BLD STRIP.AUTO-MCNC: 211 MG/DL (ref 65–100)
GLUCOSE BLD STRIP.AUTO-MCNC: 259 MG/DL (ref 65–100)
GLUCOSE BLD STRIP.AUTO-MCNC: 315 MG/DL (ref 65–100)
GLUCOSE SERPL-MCNC: 201 MG/DL (ref 65–100)
GRAM STN SPEC: ABNORMAL
HCT VFR BLD AUTO: 32.9 % (ref 41.1–50.3)
HGB BLD-MCNC: 11.2 G/DL (ref 13.6–17.2)
IMM GRANULOCYTES # BLD AUTO: 0 K/UL (ref 0–0.5)
IMM GRANULOCYTES NFR BLD AUTO: 1 % (ref 0–5)
LYMPHOCYTES # BLD: 0.5 K/UL (ref 0.5–4.6)
LYMPHOCYTES NFR BLD: 11 % (ref 13–44)
MCH RBC QN AUTO: 32.7 PG (ref 26.1–32.9)
MCHC RBC AUTO-ENTMCNC: 34 G/DL (ref 31.4–35)
MCV RBC AUTO: 95.9 FL (ref 79.6–97.8)
MONOCYTES # BLD: 0.5 K/UL (ref 0.1–1.3)
MONOCYTES NFR BLD: 12 % (ref 4–12)
NEUTS SEG # BLD: 3.1 K/UL (ref 1.7–8.2)
NEUTS SEG NFR BLD: 77 % (ref 43–78)
NRBC # BLD: 0 K/UL (ref 0–0.2)
PLATELET # BLD AUTO: 117 K/UL (ref 150–450)
PMV BLD AUTO: 11.6 FL (ref 9.4–12.3)
POTASSIUM SERPL-SCNC: 4.6 MMOL/L (ref 3.5–5.1)
PROT SERPL-MCNC: 6.9 G/DL (ref 6.3–8.2)
RBC # BLD AUTO: 3.43 M/UL (ref 4.23–5.6)
SERVICE CMNT-IMP: ABNORMAL
SODIUM SERPL-SCNC: 132 MMOL/L (ref 138–145)
WBC # BLD AUTO: 4.1 K/UL (ref 4.3–11.1)

## 2021-01-03 PROCEDURE — 74011250637 HC RX REV CODE- 250/637: Performed by: INTERNAL MEDICINE

## 2021-01-03 PROCEDURE — 82962 GLUCOSE BLOOD TEST: CPT

## 2021-01-03 PROCEDURE — 65270000029 HC RM PRIVATE

## 2021-01-03 PROCEDURE — 80053 COMPREHEN METABOLIC PANEL: CPT

## 2021-01-03 PROCEDURE — 94640 AIRWAY INHALATION TREATMENT: CPT

## 2021-01-03 PROCEDURE — 77010033678 HC OXYGEN DAILY

## 2021-01-03 PROCEDURE — 77010033711 HC HIGH FLOW OXYGEN

## 2021-01-03 PROCEDURE — 74011250636 HC RX REV CODE- 250/636: Performed by: INTERNAL MEDICINE

## 2021-01-03 PROCEDURE — 74011636637 HC RX REV CODE- 636/637: Performed by: INTERNAL MEDICINE

## 2021-01-03 PROCEDURE — 74011000258 HC RX REV CODE- 258: Performed by: INTERNAL MEDICINE

## 2021-01-03 PROCEDURE — 85025 COMPLETE CBC W/AUTO DIFF WBC: CPT

## 2021-01-03 PROCEDURE — 94760 N-INVAS EAR/PLS OXIMETRY 1: CPT

## 2021-01-03 PROCEDURE — 36415 COLL VENOUS BLD VENIPUNCTURE: CPT

## 2021-01-03 PROCEDURE — 2709999900 HC NON-CHARGEABLE SUPPLY

## 2021-01-03 RX ORDER — INSULIN GLARGINE 100 [IU]/ML
0.2 INJECTION, SOLUTION SUBCUTANEOUS DAILY
Status: DISCONTINUED | OUTPATIENT
Start: 2021-01-03 | End: 2021-01-05

## 2021-01-03 RX ORDER — LORATADINE 10 MG/1
10 TABLET ORAL DAILY
Status: DISCONTINUED | OUTPATIENT
Start: 2021-01-04 | End: 2021-01-26 | Stop reason: HOSPADM

## 2021-01-03 RX ADMIN — CEFTRIAXONE SODIUM 1 G: 1 INJECTION, POWDER, FOR SOLUTION INTRAMUSCULAR; INTRAVENOUS at 22:45

## 2021-01-03 RX ADMIN — Medication 10 ML: at 08:24

## 2021-01-03 RX ADMIN — BUDESONIDE AND FORMOTEROL FUMARATE DIHYDRATE 2 PUFF: 160; 4.5 AEROSOL RESPIRATORY (INHALATION) at 08:56

## 2021-01-03 RX ADMIN — ROSUVASTATIN 10 MG: 10 TABLET, FILM COATED ORAL at 22:00

## 2021-01-03 RX ADMIN — DEXAMETHASONE 6 MG: 4 TABLET ORAL at 08:26

## 2021-01-03 RX ADMIN — Medication 10 ML: at 13:40

## 2021-01-03 RX ADMIN — LORATADINE 5 MG: 10 TABLET ORAL at 08:28

## 2021-01-03 RX ADMIN — MONTELUKAST SODIUM 10 MG: 10 TABLET, FILM COATED ORAL at 22:00

## 2021-01-03 RX ADMIN — HEPARIN SODIUM 5000 UNITS: 5000 INJECTION INTRAVENOUS; SUBCUTANEOUS at 22:45

## 2021-01-03 RX ADMIN — DOXYCYCLINE 100 MG: 100 INJECTION, POWDER, LYOPHILIZED, FOR SOLUTION INTRAVENOUS at 08:23

## 2021-01-03 RX ADMIN — ALBUTEROL SULFATE 2 PUFF: 108 INHALANT RESPIRATORY (INHALATION) at 08:56

## 2021-01-03 RX ADMIN — ALBUTEROL SULFATE 2 PUFF: 108 INHALANT RESPIRATORY (INHALATION) at 14:03

## 2021-01-03 RX ADMIN — Medication 10 ML: at 22:00

## 2021-01-03 RX ADMIN — BUDESONIDE AND FORMOTEROL FUMARATE DIHYDRATE 2 PUFF: 160; 4.5 AEROSOL RESPIRATORY (INHALATION) at 20:00

## 2021-01-03 RX ADMIN — DOXYCYCLINE 100 MG: 100 INJECTION, POWDER, LYOPHILIZED, FOR SOLUTION INTRAVENOUS at 21:37

## 2021-01-03 RX ADMIN — HEPARIN SODIUM 5000 UNITS: 5000 INJECTION INTRAVENOUS; SUBCUTANEOUS at 16:45

## 2021-01-03 RX ADMIN — HEPARIN SODIUM 5000 UNITS: 5000 INJECTION INTRAVENOUS; SUBCUTANEOUS at 08:25

## 2021-01-03 RX ADMIN — MAGNESIUM GLUCONATE 500 MG ORAL TABLET 400 MG: 500 TABLET ORAL at 08:29

## 2021-01-03 RX ADMIN — INSULIN LISPRO 6 UNITS: 100 INJECTION, SOLUTION INTRAVENOUS; SUBCUTANEOUS at 16:44

## 2021-01-03 RX ADMIN — INSULIN LISPRO 2 UNITS: 100 INJECTION, SOLUTION INTRAVENOUS; SUBCUTANEOUS at 22:00

## 2021-01-03 RX ADMIN — INSULIN LISPRO 8 UNITS: 100 INJECTION, SOLUTION INTRAVENOUS; SUBCUTANEOUS at 11:36

## 2021-01-03 RX ADMIN — ASPIRIN 81 MG: 81 TABLET, COATED ORAL at 21:00

## 2021-01-03 RX ADMIN — INSULIN GLARGINE 26 UNITS: 100 INJECTION, SOLUTION SUBCUTANEOUS at 09:25

## 2021-01-03 RX ADMIN — HYDROCHLOROTHIAZIDE 12.5 MG: 25 TABLET ORAL at 08:25

## 2021-01-03 RX ADMIN — INSULIN LISPRO 4 UNITS: 100 INJECTION, SOLUTION INTRAVENOUS; SUBCUTANEOUS at 06:33

## 2021-01-03 RX ADMIN — ALBUTEROL SULFATE 2 PUFF: 108 INHALANT RESPIRATORY (INHALATION) at 20:00

## 2021-01-03 RX ADMIN — HEPARIN SODIUM 5000 UNITS: 5000 INJECTION INTRAVENOUS; SUBCUTANEOUS at 00:45

## 2021-01-03 RX ADMIN — Medication 10 ML: at 06:33

## 2021-01-03 RX ADMIN — CLOPIDOGREL BISULFATE 75 MG: 75 TABLET ORAL at 08:29

## 2021-01-03 RX ADMIN — AMLODIPINE BESYLATE: 10 TABLET ORAL at 08:28

## 2021-01-03 RX ADMIN — CEFTRIAXONE SODIUM 1 G: 1 INJECTION, POWDER, FOR SOLUTION INTRAMUSCULAR; INTRAVENOUS at 00:44

## 2021-01-03 NOTE — PROGRESS NOTES
Hospitalist Progress Note    Patient: Marialuisa Villanueva MRN: [id number]  SSN: [id number]    YOB: 1945  Age: 67 y.o. Sex: male      Admit Date: 12/31/2020    LOS: 3 days     Subjective:     68 yo CM with past history of CAD s/p stents, ischemic cardiomyopathy, PAD (on Plavix and cilostazo), COPD, DM type II, HTN, HLD presents via EMS after fall at home and was admitted due to acute respiratory failure due to COVID and had an episode of VTach. 1/3 - Became confused and agitated and more hypoxic on 1/2, now back to baseline. On Airvo. Feels SOB. No cough. No CP. Denies N/V. No diarrhea since admission. Review of systems negative except stated above. Objective:     Visit Vitals  /76   Pulse 69   Temp 98.1 °F (36.7 °C)   Resp 19   Ht 6' 1\" (1.854 m)   Wt 131.1 kg (289 lb)   SpO2 96%   BMI 38.13 kg/m²      Oxygen Therapy  O2 Sat (%): 96 % (01/03/21 0725)  Pulse via Oximetry: 76 beats per minute (01/02/21 2026)  O2 Device: Heated; Hi flow nasal cannula (01/02/21 2026)  O2 Flow Rate (L/min): 50 l/min (01/02/21 2026)  O2 Temperature: 87.8 °F (31 °C) (01/02/21 2026)  FIO2 (%): 65 % (01/02/21 2026)      Intake and Output:     Intake/Output Summary (Last 24 hours) at 1/3/2021 0852  Last data filed at 1/2/2021 1435  Gross per 24 hour   Intake 240 ml   Output    Net 240 ml         Physical Exam:   GENERAL: alert, cooperative, moderate resp distress, appears stated age  EYE: conjunctivae/corneas clear. PERRL. THROAT & NECK: normal and no erythema or exudates noted. LUNG: clear to auscultation bilaterally  HEART: regular rate and rhythm, S1S2, no murmur, no JVD  ABDOMEN: soft, non-tender, non-distended. Bowel sounds normal.   EXTREMITIES:  No edema, 2+ pedal/radial pulses bilaterally  SKIN: no rash or abnormalities  NEUROLOGIC: A&Ox3. Cranial nerves 2-12 grossly intact.     Lab/Data Review:  Recent Results (from the past 24 hour(s))   POC G3    Collection Time: 01/02/21 11:37 AM   Result Value Ref Range    Device: NASAL CANNULA      pH (POC) 7.39 7.35 - 7.45      pCO2 (POC) 35.8 35 - 45 MMHG    pO2 (POC) 55 (L) 75 - 100 MMHG    HCO3 (POC) 21.7 (L) 22 - 26 MMOL/L    sO2 (POC) 88 (L) 95 - 98 %    Base deficit (POC) 3 mmol/L    Allens test (POC) YES      Site RIGHT RADIAL      Specimen type (POC) ARTERIAL      Performed by Kerry     CO2, POC 23 MMOL/L    Flow rate (POC) 5.000 L/min    Critical value read back 00:01     COLLECT TIME 1,130     GLUCOSE, POC    Collection Time: 01/02/21 11:47 AM   Result Value Ref Range    Glucose (POC) 208 (H) 65 - 100 mg/dL   GLUCOSE, POC    Collection Time: 01/02/21  4:35 PM   Result Value Ref Range    Glucose (POC) 250 (H) 65 - 100 mg/dL   GLUCOSE, POC    Collection Time: 01/02/21  8:39 PM   Result Value Ref Range    Glucose (POC) 236 (H) 65 - 100 mg/dL   GLUCOSE, POC    Collection Time: 01/03/21  5:47 AM   Result Value Ref Range    Glucose (POC) 211 (H) 65 - 100 mg/dL       SARS-CoV-2 Lab Results  \"Novel Coronavirus\" Test: No results found for: COV2NT   \"Emergent Disease\" Test: No results found for: EDPR  \"SARS-COV-2\" Test: No results found for: XGCOVT  \"Precision Labs\" Test: No results found for: RSLT  Rapid Test:   Lab Results   Component Value Date/Time    COVR Detected (AA) 12/31/2020 05:49 AM           Imaging:  Xr Chest Port    Result Date: 12/31/2020  EXAM: Chest x-ray. INDICATION: Dyspnea. COMPARISON: Prior chest x-ray on April 9, 2019. TECHNIQUE: 2 frontal views of the chest were obtained. FINDINGS: The lungs are hyperexpanded, consistent with the reported history of COPD. There is new mild infiltrate in the right lung base. The left lung is clear. The heart is borderline enlarged. The mediastinal contour and pulmonary vasculature are normal. No pneumothorax or pleural effusion is seen. There are degenerative changes in the spine. IMPRESSION: 1. Mild right lung base infiltrate, suspect for acute pneumonia. 2. COPD.     No results found for this visit on 12/31/20. Cultures: All Micro Results     Procedure Component Value Units Date/Time    CULTURE, BLOOD [[id number]]  (Abnormal) Collected: 12/31/20 0549    Order Status: Completed Specimen: Blood Updated: 01/03/21 0736     Special Requests: --        NO SPECIAL REQUESTS  RIGHT  FOREARM       GRAM STAIN GRAM POSITIVE COCCI         AEROBIC BOTTLE POSITIVE               RESULTS VERIFIED, PHONED TO AND READ BACK BY GALLITO DSOUZA @ 0451 1/1/21 MM           Culture result:       ALPHA STREPTOCOCCUS, NOT S. PNEUMONIAE                  STAPHYLOCOCCUS SPECIES, COAGULASE NEGATIVE            REFER TO [place] [id number]            THIS ORGANISM MAY BE INDICATIVE OF CULTURE CONTAMINATION, HOWEVER, CLINICAL CORRELATION NEEDS TO BE EVALUATED, AS EACH CASE IS UNIQUE. CULTURE, BLOOD [[id number]] Collected: 01/02/21 0848    Order Status: Completed Specimen: Blood Updated: 01/02/21 0934    CULTURE, BLOOD [[id number]] Collected: 01/02/21 0843    Order Status: Completed Specimen: Blood Updated: 01/02/21 0934    CULTURE, BLOOD [[id number]] Collected: 12/31/20 0309    Order Status: Completed Specimen: Blood Updated: 01/02/21 0923     Special Requests: --        RIGHT  HAND       Culture result: NO GROWTH 2 DAYS       BLOOD CULTURE ID PANEL [[id number]]  (Abnormal) Collected: 12/31/20 0549    Order Status: Completed Specimen: Blood Updated: 01/01/21 0801     Acc. no. from Micro Order [id number]     Staphylococcus Detected        Comment: RESULTS VERIFIED, PHONED TO AND READ BACK BY  GALLITO DSOUZA @ 0451 1/1/21 MM          Streptococcus Detected        Comment: RESULTS VERIFIED, PHONED TO AND READ BACK BY  GALLITO DSOUZA @ 0451 1/1/21 MM          mecA (Methicillin-Resistance Genes) NOT DETECTED        INTERPRETATION       Multiple organisms detected. Results do not replace susceptibility testing.                 Assessment/Plan:     Principal Problem:    Acute respiratory failure with hypoxia ([address] (10/23/2014)  - Due to COVID + pneumonia  - Now on Airvo  - Continue Decadron  - Refused Remdesivir  - Continue home aerosols  - Continue Ceftriaxone + Doxy  - Wean oxygen as appropriate    Active Problems:    Monomorphic ventricular tachycardia ([facility] [address] (12/31/2020)  - Patient found to have monomorphic VT with EMS, converted on his own  - Given Mag in ER  - Cardiology saw him in ER and recommended correcting electrolytes  - Start Amio if it happens again  - Cardiology signed out, didn't write a note?       COVID-19 (12/31/2020)  - COVID positive 12/30  - Now hypoxic on Airvo  - Continue Decadron 6mg daily x 10 days  - Refused Remdesivir  - Refused convalescent plasma  - Vitamin D 32      Bacteremia due to gram positive bacteria (1/1/2021)  - Initial blood cultures with strep + staph?  - Continue Ceftriaxone + Doxy since improving  - Repeat cultures NGTD   - If persistent, will need ECHO      AGUSTIN (acute kidney injury) ([facility] (9/15/2014)  - Resolved      CAP (community acquired pneumonia) (12/31/2020)  - CXR with RLL infiltrate  - Continue Ceftriaxone + Doxy  - Monitor labs and vitals      Elevated troponin (12/31/2020)  - Troponin 72 --> 425 --> 391  - No acute CP  - ?COVID related vs VT  - Repeat troponin until trending down  - Continue ASA + Plavix  - Cardiology assess in ER(?) --> spoke to them and they think it's COVID      DM (diabetes mellitus) type II, controlled, with peripheral vascular disorder ([facility] (9/11/2014)  - A1C on 12/16/20 was 6.8  - Continue Humalog SSI      Coronary artery disease involving native coronary artery of native heart without angina pectoris (10/12/2011)  - No acute CP, but trop elevated  - Continue ASA + Plavix  - Continue Crestor      COPD (chronic obstructive pulmonary disease) ([facility] [address] (4/10/2015)  - No acute wheezing  - Continue home aerosols      PAD (peripheral artery disease) ([facility] [address] (11/10/2015)  - Continue ASA + Plavix      Morbid obesity ([facility] (10/12/2011)      Hyperlipidemia (11/10/2015)  - Continue Crestor      Today's Plan: Contine Decadron. Refused convalescent plasma and Remdesivir. Continue Ceftriaxone + Doxy.     DIET DIABETIC CONSISTENT CARB Regular    DVT Prophylaxis: Heparin    Discharge Plan: TBD      Signed By: Brianna Young DO     January 3, 2021

## 2021-01-03 NOTE — PROGRESS NOTES
SBAR received from April Lockhart, [facility]. Patient stable, sitting in chair, in no apparent distress. Receiving supplemental oxygen via Airvo at 50 L/min at 70%. Respirations even and unlabored. Bed/chair locked and low. Call light within reach. Droplet plus precautions maintained.

## 2021-01-03 NOTE — PROGRESS NOTES
End of Shift Note    Patient resting in chair at this time. Receiving supplemental oxygen at 50 L/min at 65%. Respirations even and unlabored. AAOX4. NAD noted. Bed locked and low. Call light within reach. Preparing to give report to oncoming nurse.

## 2021-01-04 LAB
ALBUMIN SERPL-MCNC: 2.7 G/DL (ref 3.2–4.6)
ALBUMIN/GLOB SERPL: 0.7 {RATIO} (ref 1.2–3.5)
ALP SERPL-CCNC: 49 U/L (ref 50–136)
ALT SERPL-CCNC: 52 U/L (ref 12–65)
ANION GAP SERPL CALC-SCNC: 6 MMOL/L (ref 7–16)
AST SERPL-CCNC: 71 U/L (ref 15–37)
BASOPHILS # BLD: 0 K/UL (ref 0–0.2)
BASOPHILS NFR BLD: 0 % (ref 0–2)
BILIRUB SERPL-MCNC: 0.6 MG/DL (ref 0.2–1.1)
BUN SERPL-MCNC: 38 MG/DL (ref 8–23)
CALCIUM SERPL-MCNC: 8.1 MG/DL (ref 8.3–10.4)
CHLORIDE SERPL-SCNC: 99 MMOL/L (ref 98–107)
CO2 SERPL-SCNC: 27 MMOL/L (ref 21–32)
CREAT SERPL-MCNC: 1.23 MG/DL (ref 0.8–1.5)
DIFFERENTIAL METHOD BLD: ABNORMAL
EOSINOPHIL # BLD: 0 K/UL (ref 0–0.8)
EOSINOPHIL NFR BLD: 0 % (ref 0.5–7.8)
ERYTHROCYTE [DISTWIDTH] IN BLOOD BY AUTOMATED COUNT: 14.7 % (ref 11.9–14.6)
GLOBULIN SER CALC-MCNC: 3.7 G/DL (ref 2.3–3.5)
GLUCOSE BLD STRIP.AUTO-MCNC: 191 MG/DL (ref 65–100)
GLUCOSE BLD STRIP.AUTO-MCNC: 219 MG/DL (ref 65–100)
GLUCOSE BLD STRIP.AUTO-MCNC: 286 MG/DL (ref 65–100)
GLUCOSE BLD STRIP.AUTO-MCNC: 356 MG/DL (ref 65–100)
GLUCOSE SERPL-MCNC: 190 MG/DL (ref 65–100)
HCT VFR BLD AUTO: 31.1 % (ref 41.1–50.3)
HGB BLD-MCNC: 10.5 G/DL (ref 13.6–17.2)
IMM GRANULOCYTES # BLD AUTO: 0.1 K/UL (ref 0–0.5)
IMM GRANULOCYTES NFR BLD AUTO: 1 % (ref 0–5)
LYMPHOCYTES # BLD: 0.6 K/UL (ref 0.5–4.6)
LYMPHOCYTES NFR BLD: 13 % (ref 13–44)
MCH RBC QN AUTO: 32.5 PG (ref 26.1–32.9)
MCHC RBC AUTO-ENTMCNC: 33.8 G/DL (ref 31.4–35)
MCV RBC AUTO: 96.3 FL (ref 79.6–97.8)
MONOCYTES # BLD: 0.4 K/UL (ref 0.1–1.3)
MONOCYTES NFR BLD: 10 % (ref 4–12)
NEUTS SEG # BLD: 3.2 K/UL (ref 1.7–8.2)
NEUTS SEG NFR BLD: 76 % (ref 43–78)
NRBC # BLD: 0 K/UL (ref 0–0.2)
PLATELET # BLD AUTO: 111 K/UL (ref 150–450)
PMV BLD AUTO: 10.9 FL (ref 9.4–12.3)
POTASSIUM SERPL-SCNC: 4.4 MMOL/L (ref 3.5–5.1)
PROT SERPL-MCNC: 6.4 G/DL (ref 6.3–8.2)
RBC # BLD AUTO: 3.23 M/UL (ref 4.23–5.6)
SODIUM SERPL-SCNC: 132 MMOL/L (ref 138–145)
WBC # BLD AUTO: 4.2 K/UL (ref 4.3–11.1)

## 2021-01-04 PROCEDURE — 94762 N-INVAS EAR/PLS OXIMTRY CONT: CPT

## 2021-01-04 PROCEDURE — 94760 N-INVAS EAR/PLS OXIMETRY 1: CPT

## 2021-01-04 PROCEDURE — 2709999900 HC NON-CHARGEABLE SUPPLY

## 2021-01-04 PROCEDURE — 82962 GLUCOSE BLOOD TEST: CPT

## 2021-01-04 PROCEDURE — 94640 AIRWAY INHALATION TREATMENT: CPT

## 2021-01-04 PROCEDURE — 74011250636 HC RX REV CODE- 250/636: Performed by: INTERNAL MEDICINE

## 2021-01-04 PROCEDURE — 74011250637 HC RX REV CODE- 250/637: Performed by: INTERNAL MEDICINE

## 2021-01-04 PROCEDURE — 85025 COMPLETE CBC W/AUTO DIFF WBC: CPT

## 2021-01-04 PROCEDURE — 74011636637 HC RX REV CODE- 636/637: Performed by: INTERNAL MEDICINE

## 2021-01-04 PROCEDURE — 94660 CPAP INITIATION&MGMT: CPT

## 2021-01-04 PROCEDURE — 74011000258 HC RX REV CODE- 258: Performed by: INTERNAL MEDICINE

## 2021-01-04 PROCEDURE — 36415 COLL VENOUS BLD VENIPUNCTURE: CPT

## 2021-01-04 PROCEDURE — 80053 COMPREHEN METABOLIC PANEL: CPT

## 2021-01-04 PROCEDURE — 77010033711 HC HIGH FLOW OXYGEN

## 2021-01-04 PROCEDURE — 65270000029 HC RM PRIVATE

## 2021-01-04 RX ADMIN — HEPARIN SODIUM 5000 UNITS: 5000 INJECTION INTRAVENOUS; SUBCUTANEOUS at 09:21

## 2021-01-04 RX ADMIN — HEPARIN SODIUM 5000 UNITS: 5000 INJECTION INTRAVENOUS; SUBCUTANEOUS at 16:47

## 2021-01-04 RX ADMIN — BUDESONIDE AND FORMOTEROL FUMARATE DIHYDRATE 2 PUFF: 160; 4.5 AEROSOL RESPIRATORY (INHALATION) at 08:59

## 2021-01-04 RX ADMIN — LORATADINE 10 MG: 10 TABLET ORAL at 09:18

## 2021-01-04 RX ADMIN — ALBUTEROL SULFATE 2 PUFF: 108 INHALANT RESPIRATORY (INHALATION) at 21:30

## 2021-01-04 RX ADMIN — DEXAMETHASONE 6 MG: 4 TABLET ORAL at 09:19

## 2021-01-04 RX ADMIN — Medication 10 ML: at 21:39

## 2021-01-04 RX ADMIN — MAGNESIUM GLUCONATE 500 MG ORAL TABLET 400 MG: 500 TABLET ORAL at 09:19

## 2021-01-04 RX ADMIN — AMLODIPINE BESYLATE: 10 TABLET ORAL at 09:18

## 2021-01-04 RX ADMIN — DOXYCYCLINE 100 MG: 100 INJECTION, POWDER, LYOPHILIZED, FOR SOLUTION INTRAVENOUS at 21:38

## 2021-01-04 RX ADMIN — ROSUVASTATIN 10 MG: 10 TABLET, FILM COATED ORAL at 21:38

## 2021-01-04 RX ADMIN — ALBUTEROL SULFATE 2 PUFF: 108 INHALANT RESPIRATORY (INHALATION) at 08:59

## 2021-01-04 RX ADMIN — INSULIN GLARGINE 26 UNITS: 100 INJECTION, SOLUTION SUBCUTANEOUS at 09:18

## 2021-01-04 RX ADMIN — ALBUTEROL SULFATE 2 PUFF: 108 INHALANT RESPIRATORY (INHALATION) at 15:45

## 2021-01-04 RX ADMIN — INSULIN LISPRO 2 UNITS: 100 INJECTION, SOLUTION INTRAVENOUS; SUBCUTANEOUS at 07:30

## 2021-01-04 RX ADMIN — BUDESONIDE AND FORMOTEROL FUMARATE DIHYDRATE 2 PUFF: 160; 4.5 AEROSOL RESPIRATORY (INHALATION) at 21:30

## 2021-01-04 RX ADMIN — CLOPIDOGREL BISULFATE 75 MG: 75 TABLET ORAL at 09:19

## 2021-01-04 RX ADMIN — INSULIN LISPRO 4 UNITS: 100 INJECTION, SOLUTION INTRAVENOUS; SUBCUTANEOUS at 13:16

## 2021-01-04 RX ADMIN — Medication 10 ML: at 13:16

## 2021-01-04 RX ADMIN — MONTELUKAST SODIUM 10 MG: 10 TABLET, FILM COATED ORAL at 21:38

## 2021-01-04 RX ADMIN — INSULIN LISPRO 10 UNITS: 100 INJECTION, SOLUTION INTRAVENOUS; SUBCUTANEOUS at 16:47

## 2021-01-04 RX ADMIN — INSULIN LISPRO 6 UNITS: 100 INJECTION, SOLUTION INTRAVENOUS; SUBCUTANEOUS at 21:39

## 2021-01-04 RX ADMIN — Medication 10 ML: at 06:00

## 2021-01-04 RX ADMIN — HYDROCHLOROTHIAZIDE 12.5 MG: 25 TABLET ORAL at 09:19

## 2021-01-04 RX ADMIN — DOXYCYCLINE 100 MG: 100 INJECTION, POWDER, LYOPHILIZED, FOR SOLUTION INTRAVENOUS at 09:20

## 2021-01-04 RX ADMIN — ASPIRIN 81 MG: 81 TABLET, COATED ORAL at 21:38

## 2021-01-04 NOTE — PROGRESS NOTES
Hospitalist Progress Note    Patient: Jewels Lanza MRN: [id number]  SSN: [id number]    YOB: 1945  Age: 67 y.o. Sex: male      Admit Date: 12/31/2020    LOS: 4 days     Subjective:     68 yo CM with past history of CAD s/p stents, ischemic cardiomyopathy, PAD (on Plavix and cilostazo), COPD, DM type II, HTN, HLD presents via EMS after fall at home and was admitted due to acute respiratory failure due to COVID and had an episode of VTach. 1/4 - Another episode of confusion and agitation over night, now back to baseline. On Airvo. Feels SOB. No cough. No CP. Denies N/V. No diarrhea since admission. Review of systems negative except stated above. Objective:     Visit Vitals  BP (!) 147/70 (BP 1 Location: Right arm, BP Patient Position: Head of bed elevated (Comment degrees))   Pulse 69   Temp 99.8 °F (37.7 °C)   Resp 24   Ht 6' 1\" (1.854 m)   Wt 131.1 kg (289 lb)   SpO2 93%   BMI 38.13 kg/m²      Oxygen Therapy  O2 Sat (%): 93 % (01/04/21 0859)  Pulse via Oximetry: 92 beats per minute (01/04/21 0859)  O2 Device: Heated; Hi flow nasal cannula (01/04/21 0859)  O2 Flow Rate (L/min): 50 l/min (01/04/21 0859)  O2 Temperature: 87.8 °F (31 °C) (01/04/21 0859)  FIO2 (%): 60 % (01/04/21 0859)      Intake and Output:     Intake/Output Summary (Last 24 hours) at 1/4/2021 1122  Last data filed at 1/4/2021 0807  Gross per 24 hour   Intake 600 ml   Output 900 ml   Net -300 ml         Physical Exam:   GENERAL: alert, cooperative, moderate resp distress, appears stated age  EYE: conjunctivae/corneas clear. PERRL. THROAT & NECK: normal and no erythema or exudates noted. LUNG: clear to auscultation bilaterally  HEART: regular rate and rhythm, S1S2, no murmur, no JVD  ABDOMEN: soft, non-tender, non-distended. Bowel sounds normal.   EXTREMITIES:  No edema, 2+ pedal/radial pulses bilaterally  SKIN: no rash or abnormalities  NEUROLOGIC: A&Ox3. Cranial nerves 2-12 grossly intact.     Lab/Data Review:  Recent Results (from the past 24 hour(s))   GLUCOSE, POC    Collection Time: 01/03/21  4:03 PM   Result Value Ref Range    Glucose (POC) 259 (H) 65 - 100 mg/dL   GLUCOSE, POC    Collection Time: 01/03/21  8:46 PM   Result Value Ref Range    Glucose (POC) 171 (H) 65 - 100 mg/dL   GLUCOSE, POC    Collection Time: 01/04/21  5:49 AM   Result Value Ref Range    Glucose (POC) 191 (H) 65 - 100 mg/dL       SARS-CoV-2 Lab Results  \"Novel Coronavirus\" Test: No results found for: COV2NT   \"Emergent Disease\" Test: No results found for: EDPR  \"SARS-COV-2\" Test: No results found for: XGCOVT  \"Precision Labs\" Test: No results found for: RSLT  Rapid Test:   Lab Results   Component Value Date/Time    COVR Detected (AA) 12/31/2020 05:49 AM           Imaging:  Xr Chest Port    Result Date: 12/31/2020  EXAM: Chest x-ray. INDICATION: Dyspnea. COMPARISON: Prior chest x-ray on April 9, 2019. TECHNIQUE: 2 frontal views of the chest were obtained. FINDINGS: The lungs are hyperexpanded, consistent with the reported history of COPD. There is new mild infiltrate in the right lung base. The left lung is clear. The heart is borderline enlarged. The mediastinal contour and pulmonary vasculature are normal. No pneumothorax or pleural effusion is seen. There are degenerative changes in the spine. IMPRESSION: 1. Mild right lung base infiltrate, suspect for acute pneumonia. 2. COPD. No results found for this visit on 12/31/20. Cultures:   All Micro Results     Procedure Component Value Units Date/Time    CULTURE, BLOOD [[id number]] Collected: 01/02/21 0843    Order Status: Completed Specimen: Blood Updated: 01/04/21 1029     Special Requests: --        RIGHT  Antecubital       Culture result: NO GROWTH 2 DAYS       CULTURE, BLOOD [[id number]] Collected: 01/02/21 0848    Order Status: Completed Specimen: Blood Updated: 01/04/21 1029     Special Requests: --        LEFT  Antecubital       Culture result: NO GROWTH 2 DAYS       CULTURE, BLOOD [[id number]] Collected: 12/31/20 0309    Order Status: Completed Specimen: Blood Updated: 01/04/21 1028     Special Requests: --        RIGHT  HAND       Culture result: NO GROWTH 4 DAYS       CULTURE, BLOOD [[id number]]  (Abnormal) Collected: 12/31/20 0549    Order Status: Completed Specimen: Blood Updated: 01/03/21 0736     Special Requests: --        NO SPECIAL REQUESTS  RIGHT  FOREARM       GRAM STAIN GRAM POSITIVE COCCI         AEROBIC BOTTLE POSITIVE               RESULTS VERIFIED, PHONED TO AND READ BACK BY GALLITO DSOUZA @ 0451 1/1/21 MM           Culture result:       ALPHA STREPTOCOCCUS, NOT S. PNEUMONIAE                  STAPHYLOCOCCUS SPECIES, COAGULASE NEGATIVE            REFER TO Cinthya Klein [id number]            THIS ORGANISM MAY BE INDICATIVE OF CULTURE CONTAMINATION, HOWEVER, CLINICAL CORRELATION NEEDS TO BE EVALUATED, AS EACH CASE IS UNIQUE. BLOOD CULTURE ID PANEL [[id number]]  (Abnormal) Collected: 12/31/20 0549    Order Status: Completed Specimen: Blood Updated: 01/01/21 0801     Acc. no. from Micro Order [id number]     Staphylococcus Detected        Comment: RESULTS VERIFIED, PHONED TO AND READ BACK BY  GALLITO DSOUZA @ 0451 1/1/21 MM          Streptococcus Detected        Comment: RESULTS VERIFIED, PHONED TO AND READ BACK BY  GALLITO DSOUZA @ 0451 1/1/21 MM          mecA (Methicillin-Resistance Genes) NOT DETECTED        INTERPRETATION       Multiple organisms detected. Results do not replace susceptibility testing.                 Assessment/Plan:     Principal Problem:    Acute respiratory failure with hypoxia ([facility] [address] (10/23/2014)  - Due to COVID + pneumonia  - Now on Airvo  - Continue Decadron  - Refused Remdesivir  - Continue home aerosols  - Continue Ceftriaxone + Doxy  - Wean oxygen as appropriate    Active Problems:    Monomorphic ventricular tachycardia ([address] (12/31/2020)  - Patient found to have monomorphic VT with EMS, converted on his own  - Given Mag in ER  - Cardiology saw him in ER and recommended correcting electrolytes  - Start Amio if it happens again  - Cardiology signed off, didn't write a note? COVID-19 (12/31/2020)  - COVID positive 12/30  - Now hypoxic on Airvo  - Continue Decadron 6mg daily x 10 days - EOT 1/9/21  - Refused Remdesivir  - Refused convalescent plasma  - Vitamin D 32      Bacteremia due to gram positive bacteria (1/1/2021)  - Initial blood cultures with strep + staph but both common contaminants  - Continue Ceftriaxone + Doxy since improving  - Repeat cultures NGTD   - If persistent, will need ECHO      AGUSTIN (acute kidney injury) ([facility] [address] (9/15/2014)  - Resolved      CAP (community acquired pneumonia) (12/31/2020)  - CXR with RLL infiltrate  - Continue Ceftriaxone + Doxy  - Monitor labs and vitals      Elevated troponin (12/31/2020)  - Troponin 72 --> 425 --> 391  - No acute CP  - ?COVID related vs VT  - Repeat troponin until trending down  - Continue ASA + Plavix  - Cardiology assess in ER(?) --> spoke to them and they think it's COVID      DM (diabetes mellitus) type II, controlled, with peripheral vascular disorder ([facility] [address] (9/11/2014)  - A1C on 12/16/20 was 6.8  - Continue Humalog SSI      Coronary artery disease involving native coronary artery of native heart without angina pectoris (10/12/2011)  - No acute CP, but trop elevated  - Continue ASA + Plavix  - Continue Crestor      COPD (chronic obstructive pulmonary disease) ([facility] [address] (4/10/2015)  - No acute wheezing  - Continue home aerosols      PAD (peripheral artery disease) ([facility] [address] (11/10/2015)  - Continue ASA + Plavix      Morbid obesity ([facility] [address] (10/12/2011)      Hyperlipidemia (11/10/2015)  - Continue Crestor      Today's Plan: Contine Decadron. Refused convalescent plasma and Remdesivir. Continue Ceftriaxone + Doxy. Spoke to wife on phone in room.     DIET DIABETIC CONSISTENT CARB Regular    DVT Prophylaxis: Heparin    Discharge Plan: TBD      Signed By: Hemanth Healy DO     January 4, 2021

## 2021-01-04 NOTE — PROGRESS NOTES
patient continues to pull O2 off  drops down into the 60's/70's  have attempted all other forms of distraction  patient is A/Ox2   unable to tell me where he is  patient thinks he is at home this RN has tried to reorient several times and explain to him the importance of leaving O2 on  patient is NOT combative just confused  MD notified

## 2021-01-04 NOTE — PROGRESS NOTES
Chart reviewed by [facility] for discharge planning Pt is currently requiring 50L Airvo. At baseline patient was independent of ADL's  Prior to admission. PT and OT eval needed when medically stable. Discharge plan is undetermined at this time.   Will continue to follow for discharge planning needs  Please consult  if any new issues arise    Care Management Interventions  PCP Verified by CM: Yes(Calista Parsons MD)  Mode of Transport at Discharge: Self  Transition of Care Consult (CM Consult): Discharge Planning  Discharge Durable Medical Equipment: No(CPap @ home)  Current Support Network: Lives with Spouse, Own Home([phone] (spouse))  Confirm Follow Up Transport: Family  The Patient and/or Patient Representative was Provided with a Choice of Provider and Agrees with the Discharge Plan?: Yes  Freedom of Choice List was Provided with Basic Dialogue that Supports the Patient's Individualized Plan of Care/Goals, Treatment Preferences and Shares the Quality Data Associated with the Providers?: Yes  [place] Resource Information Provided?: No  Discharge Location  Discharge Placement: Unable to determine at this time

## 2021-01-05 LAB
ALBUMIN SERPL-MCNC: 2.6 G/DL (ref 3.2–4.6)
ALBUMIN/GLOB SERPL: 0.7 {RATIO} (ref 1.2–3.5)
ALP SERPL-CCNC: 52 U/L (ref 50–136)
ALT SERPL-CCNC: 55 U/L (ref 12–65)
ANION GAP SERPL CALC-SCNC: 4 MMOL/L (ref 7–16)
AST SERPL-CCNC: 62 U/L (ref 15–37)
BACTERIA SPEC CULT: NORMAL
BASOPHILS # BLD: 0 K/UL (ref 0–0.2)
BASOPHILS NFR BLD: 0 % (ref 0–2)
BILIRUB SERPL-MCNC: 0.5 MG/DL (ref 0.2–1.1)
BUN SERPL-MCNC: 46 MG/DL (ref 8–23)
CALCIUM SERPL-MCNC: 8.2 MG/DL (ref 8.3–10.4)
CHLORIDE SERPL-SCNC: 98 MMOL/L (ref 98–107)
CO2 SERPL-SCNC: 30 MMOL/L (ref 21–32)
CREAT SERPL-MCNC: 1.34 MG/DL (ref 0.8–1.5)
DIFFERENTIAL METHOD BLD: ABNORMAL
EOSINOPHIL # BLD: 0 K/UL (ref 0–0.8)
EOSINOPHIL NFR BLD: 0 % (ref 0.5–7.8)
ERYTHROCYTE [DISTWIDTH] IN BLOOD BY AUTOMATED COUNT: 14.6 % (ref 11.9–14.6)
GLOBULIN SER CALC-MCNC: 3.8 G/DL (ref 2.3–3.5)
GLUCOSE BLD STRIP.AUTO-MCNC: 279 MG/DL (ref 65–100)
GLUCOSE BLD STRIP.AUTO-MCNC: 349 MG/DL (ref 65–100)
GLUCOSE BLD STRIP.AUTO-MCNC: 357 MG/DL (ref 65–100)
GLUCOSE BLD STRIP.AUTO-MCNC: 363 MG/DL (ref 65–100)
GLUCOSE SERPL-MCNC: 262 MG/DL (ref 65–100)
HCT VFR BLD AUTO: 31 % (ref 41.1–50.3)
HGB BLD-MCNC: 10.6 G/DL (ref 13.6–17.2)
IMM GRANULOCYTES # BLD AUTO: 0 K/UL (ref 0–0.5)
IMM GRANULOCYTES NFR BLD AUTO: 1 % (ref 0–5)
LYMPHOCYTES # BLD: 0.5 K/UL (ref 0.5–4.6)
LYMPHOCYTES NFR BLD: 12 % (ref 13–44)
MCH RBC QN AUTO: 32.6 PG (ref 26.1–32.9)
MCHC RBC AUTO-ENTMCNC: 34.2 G/DL (ref 31.4–35)
MCV RBC AUTO: 95.4 FL (ref 79.6–97.8)
MONOCYTES # BLD: 0.4 K/UL (ref 0.1–1.3)
MONOCYTES NFR BLD: 10 % (ref 4–12)
NEUTS SEG # BLD: 3.4 K/UL (ref 1.7–8.2)
NEUTS SEG NFR BLD: 77 % (ref 43–78)
NRBC # BLD: 0 K/UL (ref 0–0.2)
PLATELET # BLD AUTO: 122 K/UL (ref 150–450)
PLATELET COMMENTS,PCOM: SLIGHT
PMV BLD AUTO: 10.9 FL (ref 9.4–12.3)
POTASSIUM SERPL-SCNC: 4.8 MMOL/L (ref 3.5–5.1)
PROT SERPL-MCNC: 6.4 G/DL (ref 6.3–8.2)
RBC # BLD AUTO: 3.25 M/UL (ref 4.23–5.6)
RBC MORPH BLD: ABNORMAL
SERVICE CMNT-IMP: NORMAL
SODIUM SERPL-SCNC: 132 MMOL/L (ref 138–145)
WBC # BLD AUTO: 4.3 K/UL (ref 4.3–11.1)
WBC MORPH BLD: ABNORMAL

## 2021-01-05 PROCEDURE — 85025 COMPLETE CBC W/AUTO DIFF WBC: CPT

## 2021-01-05 PROCEDURE — 82962 GLUCOSE BLOOD TEST: CPT

## 2021-01-05 PROCEDURE — 74011636637 HC RX REV CODE- 636/637: Performed by: INTERNAL MEDICINE

## 2021-01-05 PROCEDURE — 94762 N-INVAS EAR/PLS OXIMTRY CONT: CPT

## 2021-01-05 PROCEDURE — 65270000029 HC RM PRIVATE

## 2021-01-05 PROCEDURE — 74011250637 HC RX REV CODE- 250/637: Performed by: INTERNAL MEDICINE

## 2021-01-05 PROCEDURE — 74011636637 HC RX REV CODE- 636/637: Performed by: HOSPITALIST

## 2021-01-05 PROCEDURE — 94640 AIRWAY INHALATION TREATMENT: CPT

## 2021-01-05 PROCEDURE — 74011250636 HC RX REV CODE- 250/636: Performed by: INTERNAL MEDICINE

## 2021-01-05 PROCEDURE — 74011636637 HC RX REV CODE- 636/637: Performed by: FAMILY MEDICINE

## 2021-01-05 PROCEDURE — 77010033711 HC HIGH FLOW OXYGEN

## 2021-01-05 PROCEDURE — 80053 COMPREHEN METABOLIC PANEL: CPT

## 2021-01-05 PROCEDURE — 94660 CPAP INITIATION&MGMT: CPT

## 2021-01-05 PROCEDURE — 2709999900 HC NON-CHARGEABLE SUPPLY

## 2021-01-05 RX ORDER — INSULIN LISPRO 100 [IU]/ML
5 INJECTION, SOLUTION INTRAVENOUS; SUBCUTANEOUS ONCE
Status: COMPLETED | OUTPATIENT
Start: 2021-01-05 | End: 2021-01-05

## 2021-01-05 RX ORDER — INSULIN GLARGINE 100 [IU]/ML
35 INJECTION, SOLUTION SUBCUTANEOUS DAILY
Status: DISCONTINUED | OUTPATIENT
Start: 2021-01-06 | End: 2021-01-07

## 2021-01-05 RX ORDER — INSULIN LISPRO 100 [IU]/ML
4 INJECTION, SOLUTION INTRAVENOUS; SUBCUTANEOUS ONCE
Status: COMPLETED | OUTPATIENT
Start: 2021-01-05 | End: 2021-01-05

## 2021-01-05 RX ADMIN — INSULIN LISPRO 10 UNITS: 100 INJECTION, SOLUTION INTRAVENOUS; SUBCUTANEOUS at 21:19

## 2021-01-05 RX ADMIN — INSULIN LISPRO 6 UNITS: 100 INJECTION, SOLUTION INTRAVENOUS; SUBCUTANEOUS at 08:23

## 2021-01-05 RX ADMIN — ALBUTEROL SULFATE 2 PUFF: 108 INHALANT RESPIRATORY (INHALATION) at 14:00

## 2021-01-05 RX ADMIN — AMLODIPINE BESYLATE: 10 TABLET ORAL at 08:20

## 2021-01-05 RX ADMIN — MONTELUKAST SODIUM 10 MG: 10 TABLET, FILM COATED ORAL at 21:19

## 2021-01-05 RX ADMIN — HEPARIN SODIUM 5000 UNITS: 5000 INJECTION INTRAVENOUS; SUBCUTANEOUS at 00:06

## 2021-01-05 RX ADMIN — HEPARIN SODIUM 5000 UNITS: 5000 INJECTION INTRAVENOUS; SUBCUTANEOUS at 16:50

## 2021-01-05 RX ADMIN — Medication 10 ML: at 06:00

## 2021-01-05 RX ADMIN — INSULIN LISPRO 4 UNITS: 100 INJECTION, SOLUTION INTRAVENOUS; SUBCUTANEOUS at 16:49

## 2021-01-05 RX ADMIN — BUDESONIDE AND FORMOTEROL FUMARATE DIHYDRATE 2 PUFF: 160; 4.5 AEROSOL RESPIRATORY (INHALATION) at 08:00

## 2021-01-05 RX ADMIN — HYDROCHLOROTHIAZIDE 12.5 MG: 25 TABLET ORAL at 08:20

## 2021-01-05 RX ADMIN — Medication 10 ML: at 21:19

## 2021-01-05 RX ADMIN — HEPARIN SODIUM 5000 UNITS: 5000 INJECTION INTRAVENOUS; SUBCUTANEOUS at 08:23

## 2021-01-05 RX ADMIN — ALBUTEROL SULFATE 2 PUFF: 108 INHALANT RESPIRATORY (INHALATION) at 08:00

## 2021-01-05 RX ADMIN — ASPIRIN 81 MG: 81 TABLET, COATED ORAL at 21:18

## 2021-01-05 RX ADMIN — INSULIN LISPRO 5 UNITS: 100 INJECTION, SOLUTION INTRAVENOUS; SUBCUTANEOUS at 22:00

## 2021-01-05 RX ADMIN — MAGNESIUM GLUCONATE 500 MG ORAL TABLET 400 MG: 500 TABLET ORAL at 08:24

## 2021-01-05 RX ADMIN — INSULIN LISPRO 10 UNITS: 100 INJECTION, SOLUTION INTRAVENOUS; SUBCUTANEOUS at 16:49

## 2021-01-05 RX ADMIN — BUDESONIDE AND FORMOTEROL FUMARATE DIHYDRATE 2 PUFF: 160; 4.5 AEROSOL RESPIRATORY (INHALATION) at 20:54

## 2021-01-05 RX ADMIN — CLOPIDOGREL BISULFATE 75 MG: 75 TABLET ORAL at 08:22

## 2021-01-05 RX ADMIN — Medication 10 ML: at 14:18

## 2021-01-05 RX ADMIN — ROSUVASTATIN 10 MG: 10 TABLET, FILM COATED ORAL at 21:19

## 2021-01-05 RX ADMIN — ACETAMINOPHEN 650 MG: 325 TABLET, FILM COATED ORAL at 21:23

## 2021-01-05 RX ADMIN — DEXAMETHASONE 6 MG: 4 TABLET ORAL at 08:20

## 2021-01-05 RX ADMIN — INSULIN GLARGINE 26 UNITS: 100 INJECTION, SOLUTION SUBCUTANEOUS at 08:24

## 2021-01-05 RX ADMIN — ALBUTEROL SULFATE 2 PUFF: 108 INHALANT RESPIRATORY (INHALATION) at 20:54

## 2021-01-05 RX ADMIN — LORATADINE 10 MG: 10 TABLET ORAL at 08:23

## 2021-01-05 RX ADMIN — INSULIN LISPRO 8 UNITS: 100 INJECTION, SOLUTION INTRAVENOUS; SUBCUTANEOUS at 12:07

## 2021-01-05 NOTE — PROGRESS NOTES
Patient remains stable. No S/Sx of distress. Respirations even and unlabored. Call light within reach. Preparing to give report to oncoming shift.

## 2021-01-05 NOTE — DIABETES MGMT
Patient is COVID +. Accessed chart related to glycemic control to see if patient would benefit from diabetes management services. Patient glucose yesterday ranged 191-356 with patient receiving Lantus 26 units, Humalog 22 units, and dexamethasone 6 mg. Blood glucose 279 this morning. Provider could consider initiation of prandial insulin and if fasting remains elevated consider increasing basal insulin tomorrow as well. Provider updated via [employer] regarding patient glycemic control and recommendations. Patient takes oral medications at home per PTA med list. A1c 6.8.

## 2021-01-05 NOTE — PROGRESS NOTES
Patient has been off restraints since 2030. Patient is doing well. Lines are maintained and O2 stable. Will continue to monitor.

## 2021-01-05 NOTE — PROGRESS NOTES
Pt is A&O x 4. Pt cardiac rhythm is regular. Pt lungs are diminished. Pt is on Airvo 60L at 100%. Bowel sounds present. Pt denies pain or nausea. Pt is resting in bed with the bed in the lowest position and the call light within reach.

## 2021-01-05 NOTE — PROGRESS NOTES
Patient put on bipap per his request sating 98%. No complaints at this time. Pt remains calm and cooperative.

## 2021-01-05 NOTE — PROGRESS NOTES
Hospitalist Progress Note    Patient: Lynn Esteves MRN: [id number]  SSN: [id number]    YOB: 1945  Age: 67 y.o. Sex: male      Admit Date: 12/31/2020    LOS: 5 days     Subjective:   66 yo CM with past history of CAD s/p stents, ischemic cardiomyopathy, PAD (on Plavix and cilostazo), COPD, DM type II, HTN, HLD presents via EMS after fall at home and was admitted due to acute respiratory failure due to COVID and had an episode of VTach. 1/5: Patient is maxed out on Airvo. Reports he is feeling tired, shortness of breath has been improved. Denies chest pain, nausea, vomiting, abdominal pain. Review of systems negative except stated above. Objective:     Visit Vitals  BP (!) 101/57 (BP 1 Location: Right arm, BP Patient Position: At rest)   Pulse 74   Temp 98 °F (36.7 °C)   Resp 22   Ht 6' 1\" (1.854 m)   Wt 131.1 kg (289 lb)   SpO2 (!) 89%   BMI 38.13 kg/m²      Oxygen Therapy  O2 Sat (%): (!) 89 % (01/05/21 1350)  Pulse via Oximetry: 72 beats per minute (01/05/21 1350)  O2 Device: Heated; Hi flow nasal cannula (01/05/21 1350)  O2 Flow Rate (L/min): 60 l/min (01/05/21 1350)  O2 Temperature: 93.2 °F (34 °C) (01/04/21 2130)  FIO2 (%): 100 % (01/05/21 1350)      Intake and Output:     Intake/Output Summary (Last 24 hours) at 1/5/2021 1738  Last data filed at 1/5/2021 1725  Gross per 24 hour   Intake    Output 1400 ml   Net -1400 ml         Physical Exam:   GENERAL: alert, cooperative, moderate resp distress, appears stated age  EYE: conjunctivae/corneas clear. PERRL. THROAT & NECK: normal and no erythema or exudates noted. LUNG: clear to auscultation bilaterally  HEART: regular rate and rhythm, S1S2, no murmur, no JVD  ABDOMEN: soft, non-tender, non-distended. Bowel sounds normal.   EXTREMITIES:  No edema, 2+ pedal/radial pulses bilaterally  SKIN: no rash or abnormalities  NEUROLOGIC: A&Ox3. Cranial nerves 2-12 grossly intact.     Lab/Data Review:  Recent Results (from the past 24 hour(s))   GLUCOSE, POC    Collection Time: 01/04/21  8:21 PM   Result Value Ref Range    Glucose (POC) 286 (H) 65 - 322 mg/dL   METABOLIC PANEL, COMPREHENSIVE    Collection Time: 01/05/21  3:23 AM   Result Value Ref Range    Sodium 132 (L) 138 - 145 mmol/L    Potassium 4.8 3.5 - 5.1 mmol/L    Chloride 98 98 - 107 mmol/L    CO2 30 21 - 32 mmol/L    Anion gap 4 (L) 7 - 16 mmol/L    Glucose 262 (H) 65 - 100 mg/dL    BUN 46 (H) 8 - 23 MG/DL    Creatinine 1.34 0.8 - 1.5 MG/DL    GFR est AA >60 >60 ml/min/1.73m2    GFR est non-AA 55 (L) >60 ml/min/1.73m2    Calcium 8.2 (L) 8.3 - 10.4 MG/DL    Bilirubin, total 0.5 0.2 - 1.1 MG/DL    ALT (SGPT) 55 12 - 65 U/L    AST (SGOT) 62 (H) 15 - 37 U/L    Alk. phosphatase 52 50 - 136 U/L    Protein, total 6.4 6.3 - 8.2 g/dL    Albumin 2.6 (L) 3.2 - 4.6 g/dL    Globulin 3.8 (H) 2.3 - 3.5 g/dL    A-G Ratio 0.7 (L) 1.2 - 3.5     CBC WITH AUTOMATED DIFF    Collection Time: 01/05/21  3:23 AM   Result Value Ref Range    WBC 4.3 4.3 - 11.1 K/uL    RBC 3.25 (L) 4.23 - 5.6 M/uL    HGB 10.6 (L) 13.6 - 17.2 g/dL    HCT 31.0 (L) 41.1 - 50.3 %    MCV 95.4 79.6 - 97.8 FL    MCH 32.6 26.1 - 32.9 PG    MCHC 34.2 31.4 - 35.0 g/dL    RDW 14.6 11.9 - 14.6 %    PLATELET 900 (L) 670 - 450 K/uL    MPV 10.9 9.4 - 12.3 FL    ABSOLUTE NRBC 0.00 0.0 - 0.2 K/uL    NEUTROPHILS 77 43 - 78 %    LYMPHOCYTES 12 (L) 13 - 44 %    MONOCYTES 10 4.0 - 12.0 %    EOSINOPHILS 0 (L) 0.5 - 7.8 %    BASOPHILS 0 0.0 - 2.0 %    IMMATURE GRANULOCYTES 1 0.0 - 5.0 %    ABS. NEUTROPHILS 3.4 1.7 - 8.2 K/UL    ABS. LYMPHOCYTES 0.5 0.5 - 4.6 K/UL    ABS. MONOCYTES 0.4 0.1 - 1.3 K/UL    ABS. EOSINOPHILS 0.0 0.0 - 0.8 K/UL    ABS. BASOPHILS 0.0 0.0 - 0.2 K/UL    ABS. IMM.  GRANS. 0.0 0.0 - 0.5 K/UL    RBC COMMENTS SLIGHT  ANISOCYTOSIS + POIKILOCYTOSIS        WBC COMMENTS OCCASIONAL      PLATELET COMMENTS SLIGHT      DF AUTOMATED     GLUCOSE, POC    Collection Time: 01/05/21  8:07 AM   Result Value Ref Range    Glucose (POC) 279 (H) 65 - 100 mg/dL   GLUCOSE, POC    Collection Time: 01/05/21 11:42 AM   Result Value Ref Range    Glucose (POC) 349 (H) 65 - 100 mg/dL   GLUCOSE, POC    Collection Time: 01/05/21  3:57 PM   Result Value Ref Range    Glucose (POC) 357 (H) 65 - 100 mg/dL       SARS-CoV-2 Lab Results  \"Novel Coronavirus\" Test: No results found for: COV2NT   \"Emergent Disease\" Test: No results found for: EDPR  \"SARS-COV-2\" Test: No results found for: XGCOVT  \"Precision Labs\" Test: No results found for: RSLT  Rapid Test:   Lab Results   Component Value Date/Time    COVR Detected (AA) 12/31/2020 05:49 AM           Imaging:  Xr Chest Port    Result Date: 12/31/2020  EXAM: Chest x-ray. INDICATION: Dyspnea. COMPARISON: Prior chest x-ray on April 9, 2019. TECHNIQUE: 2 frontal views of the chest were obtained. FINDINGS: The lungs are hyperexpanded, consistent with the reported history of COPD. There is new mild infiltrate in the right lung base. The left lung is clear. The heart is borderline enlarged. The mediastinal contour and pulmonary vasculature are normal. No pneumothorax or pleural effusion is seen. There are degenerative changes in the spine. IMPRESSION: 1. Mild right lung base infiltrate, suspect for acute pneumonia. 2. COPD. No results found for this visit on 12/31/20. Cultures:   All Micro Results     Procedure Component Value Units Date/Time    CULTURE, BLOOD [[id number]] Collected: 01/02/21 0843    Order Status: Completed Specimen: Blood Updated: 01/05/21 1039     Special Requests: --        RIGHT  Antecubital       Culture result: NO GROWTH 3 DAYS       CULTURE, BLOOD [[id number]] Collected: 01/02/21 0848    Order Status: Completed Specimen: Blood Updated: 01/05/21 1039     Special Requests: --        LEFT  Antecubital       Culture result: NO GROWTH 3 DAYS       CULTURE, BLOOD [[id number]] Collected: 12/31/20 0309    Order Status: Completed Specimen: Blood Updated: 01/05/21 1038     Special Requests: --        RIGHT  HAND Culture result: NO GROWTH 5 DAYS       CULTURE, BLOOD [[id number]]  (Abnormal) Collected: 12/31/20 0549    Order Status: Completed Specimen: Blood Updated: 01/03/21 0736     Special Requests: --        NO SPECIAL REQUESTS  RIGHT  FOREARM       GRAM STAIN GRAM POSITIVE COCCI         AEROBIC BOTTLE POSITIVE               RESULTS VERIFIED, PHONED TO AND READ BACK BY GALLITO DSOUZA @ 0451 1/1/21 MM           Culture result:       ALPHA STREPTOCOCCUS, NOT S. PNEUMONIAE                  STAPHYLOCOCCUS SPECIES, COAGULASE NEGATIVE            REFER TO Ane Height [id number]            THIS ORGANISM MAY BE INDICATIVE OF CULTURE CONTAMINATION, HOWEVER, CLINICAL CORRELATION NEEDS TO BE EVALUATED, AS EACH CASE IS UNIQUE. BLOOD CULTURE ID PANEL [[id number]]  (Abnormal) Collected: 12/31/20 0549    Order Status: Completed Specimen: Blood Updated: 01/01/21 0801     Acc. no. from Micro Order [id number]     Staphylococcus Detected        Comment: RESULTS VERIFIED, PHONED TO AND READ BACK BY  GALLITO DSOUZA @ 0451 1/1/21 MM          Streptococcus Detected        Comment: RESULTS VERIFIED, PHONED TO AND READ BACK BY  GALLITO DSOUZA @ 0451 1/1/21 MM          mecA (Methicillin-Resistance Genes) NOT DETECTED        INTERPRETATION       Multiple organisms detected. Results do not replace susceptibility testing. Assessment/Plan:       Acute respiratory failure with hypoxia secondary to COVID-19 infection:  - Maxed out on airvo  - High risk for deterioration requiring BiPAP or intubation  - Continue Decadron  - Refused Remdesivir and convalescent plasma  - Continue home aerosols  - Continue Ceftriaxone + Doxy  - Wean oxygen as appropriate      Monomorphic ventricular tachycardia:  - Patient found to have monomorphic VT with EMS, converted on his own  - Given Mag in ER  - Cardiology saw him in ER and recommended correcting electrolytes  - Start Amio if it happens again  - Cardiology signed off, didn't write a note?       Bacteremia due to gram positive bacteria:  - Initial blood cultures with strep + staph but both common contaminants  - Continue Ceftriaxone + Doxy since improving  - Repeat cultures NGTD   - If persistent, will need ECHO      AGUSTIN:  - Resolved      Elevated troponin:  - Troponin 72 --> 425 --> 391  - No acute CP  - ?COVID related vs VT  - Repeat troponin until trending down  - Continue ASA + Plavix  - Cardiology assess in ER(?) -->  Previous attending has spoken to them and they think it's COVID      DM (diabetes mellitus) type II:  - A1C on 12/16/20 was 6.8  - Blood sugars in 300s, patient is on steroids   - Increase Lantus to 35 units   - continue Humalog SSI      Coronary artery disease:  - Stable  - Continue ASA + Plavix  - Continue Crestor      COPD:  - No acute wheezing  - Continue home aerosols      PAD:  - Continue ASA + Plavix      Hyperlipidemia:   - Continue Crestor    DIET DIABETIC CONSISTENT CARB Regular  DIET NUTRITIONAL SUPPLEMENTS All Meals; Luzmaria Johnston ( )    DVT Prophylaxis: Heparin    Discharge Plan: TBD      Signed By: Ashley Santamaria MD     January 5, 2021

## 2021-01-05 NOTE — PROGRESS NOTES
KATINA traylor from [facility]. Patient in stable condition at this time. Safety measures in place. Respirations even and unlabored. Call light within reach and patient encouraged to call nurse prn assist.    Droplet plus precautions intact. Appropriate PPE available and in use.

## 2021-01-06 ENCOUNTER — APPOINTMENT (OUTPATIENT)
Dept: GENERAL RADIOLOGY | Age: 76
DRG: 177 | End: 2021-01-06
Attending: FAMILY MEDICINE
Payer: MEDICARE

## 2021-01-06 LAB
ALBUMIN SERPL-MCNC: 2.5 G/DL (ref 3.2–4.6)
ALBUMIN/GLOB SERPL: 0.6 {RATIO} (ref 1.2–3.5)
ALP SERPL-CCNC: 51 U/L (ref 50–136)
ALT SERPL-CCNC: 77 U/L (ref 12–65)
ANION GAP SERPL CALC-SCNC: 6 MMOL/L (ref 7–16)
ARTERIAL PATENCY WRIST A: YES
AST SERPL-CCNC: 83 U/L (ref 15–37)
BASE EXCESS BLD CALC-SCNC: 1 MMOL/L
BASOPHILS # BLD: 0 K/UL (ref 0–0.2)
BASOPHILS NFR BLD: 0 % (ref 0–2)
BDY SITE: ABNORMAL
BILIRUB SERPL-MCNC: 0.4 MG/DL (ref 0.2–1.1)
BUN SERPL-MCNC: 51 MG/DL (ref 8–23)
CALCIUM SERPL-MCNC: 8.4 MG/DL (ref 8.3–10.4)
CHLORIDE SERPL-SCNC: 103 MMOL/L (ref 98–107)
CO2 BLD-SCNC: 28 MMOL/L
CO2 SERPL-SCNC: 24 MMOL/L (ref 21–32)
COLLECT TIME,HTIME: 1042
CREAT SERPL-MCNC: 1.2 MG/DL (ref 0.8–1.5)
DIFFERENTIAL METHOD BLD: ABNORMAL
EOSINOPHIL # BLD: 0 K/UL (ref 0–0.8)
EOSINOPHIL NFR BLD: 0 % (ref 0.5–7.8)
ERYTHROCYTE [DISTWIDTH] IN BLOOD BY AUTOMATED COUNT: 14.6 % (ref 11.9–14.6)
FLOW RATE ISTAT,IFRATE: 60 L/MIN
GAS FLOW.O2 O2 DELIVERY SYS: ABNORMAL L/MIN
GLOBULIN SER CALC-MCNC: 3.9 G/DL (ref 2.3–3.5)
GLUCOSE BLD STRIP.AUTO-MCNC: 275 MG/DL (ref 65–100)
GLUCOSE BLD STRIP.AUTO-MCNC: 372 MG/DL (ref 65–100)
GLUCOSE BLD STRIP.AUTO-MCNC: 392 MG/DL (ref 65–100)
GLUCOSE BLD STRIP.AUTO-MCNC: 398 MG/DL (ref 65–100)
GLUCOSE SERPL-MCNC: 275 MG/DL (ref 65–100)
HCO3 BLD-SCNC: 26.4 MMOL/L (ref 22–26)
HCT VFR BLD AUTO: 33.3 % (ref 41.1–50.3)
HGB BLD-MCNC: 11.1 G/DL (ref 13.6–17.2)
IMM GRANULOCYTES # BLD AUTO: 0.1 K/UL (ref 0–0.5)
IMM GRANULOCYTES NFR BLD AUTO: 1 % (ref 0–5)
LYMPHOCYTES # BLD: 0.4 K/UL (ref 0.5–4.6)
LYMPHOCYTES NFR BLD: 8 % (ref 13–44)
MCH RBC QN AUTO: 32.6 PG (ref 26.1–32.9)
MCHC RBC AUTO-ENTMCNC: 33.3 G/DL (ref 31.4–35)
MCV RBC AUTO: 97.7 FL (ref 79.6–97.8)
MONOCYTES # BLD: 0.4 K/UL (ref 0.1–1.3)
MONOCYTES NFR BLD: 8 % (ref 4–12)
NEUTS SEG # BLD: 4.2 K/UL (ref 1.7–8.2)
NEUTS SEG NFR BLD: 83 % (ref 43–78)
NRBC # BLD: 0 K/UL (ref 0–0.2)
O2/TOTAL GAS SETTING VFR VENT: 100 %
PCO2 BLD: 44.6 MMHG (ref 35–45)
PH BLD: 7.38 [PH] (ref 7.35–7.45)
PLATELET # BLD AUTO: 137 K/UL (ref 150–450)
PMV BLD AUTO: 11.5 FL (ref 9.4–12.3)
PO2 BLD: 55 MMHG (ref 75–100)
POTASSIUM SERPL-SCNC: 4.9 MMOL/L (ref 3.5–5.1)
PROT SERPL-MCNC: 6.4 G/DL (ref 6.3–8.2)
RBC # BLD AUTO: 3.41 M/UL (ref 4.23–5.6)
SAO2 % BLD: 87 % (ref 95–98)
SERVICE CMNT-IMP: ABNORMAL
SERVICE CMNT-IMP: ABNORMAL
SODIUM SERPL-SCNC: 133 MMOL/L (ref 136–145)
SPECIMEN TYPE: ABNORMAL
WBC # BLD AUTO: 5 K/UL (ref 4.3–11.1)

## 2021-01-06 PROCEDURE — 74011636637 HC RX REV CODE- 636/637: Performed by: HOSPITALIST

## 2021-01-06 PROCEDURE — 74011636637 HC RX REV CODE- 636/637: Performed by: FAMILY MEDICINE

## 2021-01-06 PROCEDURE — 80053 COMPREHEN METABOLIC PANEL: CPT

## 2021-01-06 PROCEDURE — 82962 GLUCOSE BLOOD TEST: CPT

## 2021-01-06 PROCEDURE — 82803 BLOOD GASES ANY COMBINATION: CPT

## 2021-01-06 PROCEDURE — 71045 X-RAY EXAM CHEST 1 VIEW: CPT

## 2021-01-06 PROCEDURE — 77010033711 HC HIGH FLOW OXYGEN

## 2021-01-06 PROCEDURE — 65270000029 HC RM PRIVATE

## 2021-01-06 PROCEDURE — 74011000302 HC RX REV CODE- 302: Performed by: FAMILY MEDICINE

## 2021-01-06 PROCEDURE — 74011636637 HC RX REV CODE- 636/637: Performed by: INTERNAL MEDICINE

## 2021-01-06 PROCEDURE — 94660 CPAP INITIATION&MGMT: CPT

## 2021-01-06 PROCEDURE — 94640 AIRWAY INHALATION TREATMENT: CPT

## 2021-01-06 PROCEDURE — 74011250637 HC RX REV CODE- 250/637: Performed by: INTERNAL MEDICINE

## 2021-01-06 PROCEDURE — 36600 WITHDRAWAL OF ARTERIAL BLOOD: CPT

## 2021-01-06 PROCEDURE — 86580 TB INTRADERMAL TEST: CPT | Performed by: FAMILY MEDICINE

## 2021-01-06 PROCEDURE — 85025 COMPLETE CBC W/AUTO DIFF WBC: CPT

## 2021-01-06 PROCEDURE — 97162 PT EVAL MOD COMPLEX 30 MIN: CPT

## 2021-01-06 PROCEDURE — 74011250636 HC RX REV CODE- 250/636: Performed by: INTERNAL MEDICINE

## 2021-01-06 PROCEDURE — 94760 N-INVAS EAR/PLS OXIMETRY 1: CPT

## 2021-01-06 PROCEDURE — 2709999900 HC NON-CHARGEABLE SUPPLY

## 2021-01-06 PROCEDURE — 94762 N-INVAS EAR/PLS OXIMTRY CONT: CPT

## 2021-01-06 PROCEDURE — 97530 THERAPEUTIC ACTIVITIES: CPT

## 2021-01-06 RX ORDER — INSULIN LISPRO 100 [IU]/ML
6 INJECTION, SOLUTION INTRAVENOUS; SUBCUTANEOUS ONCE
Status: COMPLETED | OUTPATIENT
Start: 2021-01-06 | End: 2021-01-06

## 2021-01-06 RX ORDER — INSULIN LISPRO 100 [IU]/ML
10 INJECTION, SOLUTION INTRAVENOUS; SUBCUTANEOUS
Status: DISCONTINUED | OUTPATIENT
Start: 2021-01-06 | End: 2021-01-07

## 2021-01-06 RX ORDER — INSULIN LISPRO 100 [IU]/ML
10 INJECTION, SOLUTION INTRAVENOUS; SUBCUTANEOUS ONCE
Status: COMPLETED | OUTPATIENT
Start: 2021-01-06 | End: 2021-01-06

## 2021-01-06 RX ADMIN — ALBUTEROL SULFATE 2 PUFF: 108 INHALANT RESPIRATORY (INHALATION) at 08:32

## 2021-01-06 RX ADMIN — INSULIN LISPRO 10 UNITS: 100 INJECTION, SOLUTION INTRAVENOUS; SUBCUTANEOUS at 17:18

## 2021-01-06 RX ADMIN — INSULIN LISPRO 6 UNITS: 100 INJECTION, SOLUTION INTRAVENOUS; SUBCUTANEOUS at 08:10

## 2021-01-06 RX ADMIN — AMLODIPINE BESYLATE: 10 TABLET ORAL at 08:08

## 2021-01-06 RX ADMIN — TUBERCULIN PURIFIED PROTEIN DERIVATIVE 5 UNITS: 5 INJECTION INTRADERMAL at 11:51

## 2021-01-06 RX ADMIN — INSULIN GLARGINE 35 UNITS: 100 INJECTION, SOLUTION SUBCUTANEOUS at 08:09

## 2021-01-06 RX ADMIN — HEPARIN SODIUM 5000 UNITS: 5000 INJECTION INTRAVENOUS; SUBCUTANEOUS at 23:35

## 2021-01-06 RX ADMIN — ALBUTEROL SULFATE 2 PUFF: 108 INHALANT RESPIRATORY (INHALATION) at 14:32

## 2021-01-06 RX ADMIN — Medication 10 ML: at 14:11

## 2021-01-06 RX ADMIN — CLOPIDOGREL BISULFATE 75 MG: 75 TABLET ORAL at 08:08

## 2021-01-06 RX ADMIN — MONTELUKAST SODIUM 10 MG: 10 TABLET, FILM COATED ORAL at 21:02

## 2021-01-06 RX ADMIN — Medication 10 ML: at 05:21

## 2021-01-06 RX ADMIN — LORATADINE 10 MG: 10 TABLET ORAL at 08:08

## 2021-01-06 RX ADMIN — INSULIN LISPRO 6 UNITS: 100 INJECTION, SOLUTION INTRAVENOUS; SUBCUTANEOUS at 11:50

## 2021-01-06 RX ADMIN — DEXAMETHASONE 6 MG: 4 TABLET ORAL at 08:08

## 2021-01-06 RX ADMIN — HEPARIN SODIUM 5000 UNITS: 5000 INJECTION INTRAVENOUS; SUBCUTANEOUS at 08:08

## 2021-01-06 RX ADMIN — ROSUVASTATIN 10 MG: 10 TABLET, FILM COATED ORAL at 21:02

## 2021-01-06 RX ADMIN — INSULIN LISPRO 10 UNITS: 100 INJECTION, SOLUTION INTRAVENOUS; SUBCUTANEOUS at 21:01

## 2021-01-06 RX ADMIN — Medication 10 ML: at 21:02

## 2021-01-06 RX ADMIN — HEPARIN SODIUM 5000 UNITS: 5000 INJECTION INTRAVENOUS; SUBCUTANEOUS at 00:20

## 2021-01-06 RX ADMIN — MAGNESIUM GLUCONATE 500 MG ORAL TABLET 400 MG: 500 TABLET ORAL at 08:08

## 2021-01-06 RX ADMIN — ALBUTEROL SULFATE 2 PUFF: 108 INHALANT RESPIRATORY (INHALATION) at 21:14

## 2021-01-06 RX ADMIN — ASPIRIN 81 MG: 81 TABLET, COATED ORAL at 21:02

## 2021-01-06 RX ADMIN — HEPARIN SODIUM 5000 UNITS: 5000 INJECTION INTRAVENOUS; SUBCUTANEOUS at 17:17

## 2021-01-06 RX ADMIN — HYDROCHLOROTHIAZIDE 12.5 MG: 25 TABLET ORAL at 08:08

## 2021-01-06 RX ADMIN — INSULIN LISPRO 10 UNITS: 100 INJECTION, SOLUTION INTRAVENOUS; SUBCUTANEOUS at 11:49

## 2021-01-06 RX ADMIN — BUDESONIDE AND FORMOTEROL FUMARATE DIHYDRATE 2 PUFF: 160; 4.5 AEROSOL RESPIRATORY (INHALATION) at 21:14

## 2021-01-06 RX ADMIN — BUDESONIDE AND FORMOTEROL FUMARATE DIHYDRATE 2 PUFF: 160; 4.5 AEROSOL RESPIRATORY (INHALATION) at 08:32

## 2021-01-06 NOTE — PROGRESS NOTES
Hospitalist Progress Note    Patient: Denny Faulkner MRN: [id number]  SSN: [id number]    YOB: 1945  Age: 75 y.o.  Sex: male      Admit Date: 12/31/2020    LOS: 6 days     Subjective:   76 yo CM with past history of CAD s/p stents, ischemic cardiomyopathy, PAD (on Plavix and cilostazo), COPD, DM type II, HTN, HLD presents via EMS after fall at home and was admitted due to acute respiratory failure due to COVID and had an episode of VTach.    1/6: Patient is on 60 L Airvo.  Reports he is feeling tired, and frustrated.  Complaining of back pain due to being in the bed for so long.  Shortness of breath has been improved.  Denies chest pain, nausea, vomiting, abdominal pain.    I called wife today.  No answer.    Review of systems negative except stated above.    Objective:     Visit Vitals  BP (!) 116/41 (BP 1 Location: Right arm, BP Patient Position: At rest;Supine)   Pulse 78   Temp 97.8 °F (36.6 °C)   Resp 15   Ht 6' 1\" (1.854 m)   Wt 131.1 kg (289 lb)   SpO2 90%   BMI 38.13 kg/m²      Oxygen Therapy  O2 Sat (%): 90 % (01/06/21 1125)  Pulse via Oximetry: 85 beats per minute (01/06/21 0832)  O2 Device: Heated;Hi flow nasal cannula (01/06/21 0832)  O2 Flow Rate (L/min): 60 l/min (01/06/21 0832)  O2 Temperature: 87.8 °F (31 °C) (01/06/21 0832)  FIO2 (%): 95 % (01/06/21 0832)      Intake and Output:     Intake/Output Summary (Last 24 hours) at 1/6/2021 1335  Last data filed at 1/6/2021 0441  Gross per 24 hour   Intake —   Output 1075 ml   Net -1075 ml         Physical Exam:   GENERAL: alert, cooperative, moderate resp distress, appears stated age  EYE: conjunctivae/corneas clear. PERRL.  THROAT & NECK: normal and no erythema or exudates noted.   LUNG: clear to auscultation bilaterally  HEART: regular rate and rhythm, S1S2, no murmur, no JVD  ABDOMEN: soft, non-tender, non-distended. Bowel sounds normal.   EXTREMITIES:  No edema, 2+ pedal/radial pulses bilaterally  SKIN: no rash or  abnormalities  NEUROLOGIC: A&Ox3. Cranial nerves 2-12 grossly intact. Lab/Data Review:  Recent Results (from the past 24 hour(s))   GLUCOSE, POC    Collection Time: 01/05/21  3:57 PM   Result Value Ref Range    Glucose (POC) 357 (H) 65 - 100 mg/dL   GLUCOSE, POC    Collection Time: 01/05/21  9:15 PM   Result Value Ref Range    Glucose (POC) 363 (H) 65 - 125 mg/dL   METABOLIC PANEL, COMPREHENSIVE    Collection Time: 01/06/21  4:19 AM   Result Value Ref Range    Sodium 133 (L) 136 - 145 mmol/L    Potassium 4.9 3.5 - 5.1 mmol/L    Chloride 103 98 - 107 mmol/L    CO2 24 21 - 32 mmol/L    Anion gap 6 (L) 7 - 16 mmol/L    Glucose 275 (H) 65 - 100 mg/dL    BUN 51 (H) 8 - 23 MG/DL    Creatinine 1.20 0.8 - 1.5 MG/DL    GFR est AA >60 >60 ml/min/1.73m2    GFR est non-AA >60 >60 ml/min/1.73m2    Calcium 8.4 8.3 - 10.4 MG/DL    Bilirubin, total 0.4 0.2 - 1.1 MG/DL    ALT (SGPT) 77 (H) 12 - 65 U/L    AST (SGOT) 83 (H) 15 - 37 U/L    Alk. phosphatase 51 50 - 136 U/L    Protein, total 6.4 6.3 - 8.2 g/dL    Albumin 2.5 (L) 3.2 - 4.6 g/dL    Globulin 3.9 (H) 2.3 - 3.5 g/dL    A-G Ratio 0.6 (L) 1.2 - 3.5     CBC WITH AUTOMATED DIFF    Collection Time: 01/06/21  4:19 AM   Result Value Ref Range    WBC 5.0 4.3 - 11.1 K/uL    RBC 3.41 (L) 4.23 - 5.6 M/uL    HGB 11.1 (L) 13.6 - 17.2 g/dL    HCT 33.3 (L) 41.1 - 50.3 %    MCV 97.7 79.6 - 97.8 FL    MCH 32.6 26.1 - 32.9 PG    MCHC 33.3 31.4 - 35.0 g/dL    RDW 14.6 11.9 - 14.6 %    PLATELET 172 (L) 710 - 450 K/uL    MPV 11.5 9.4 - 12.3 FL    ABSOLUTE NRBC 0.00 0.0 - 0.2 K/uL    DF AUTOMATED      NEUTROPHILS 83 (H) 43 - 78 %    LYMPHOCYTES 8 (L) 13 - 44 %    MONOCYTES 8 4.0 - 12.0 %    EOSINOPHILS 0 (L) 0.5 - 7.8 %    BASOPHILS 0 0.0 - 2.0 %    IMMATURE GRANULOCYTES 1 0.0 - 5.0 %    ABS. NEUTROPHILS 4.2 1.7 - 8.2 K/UL    ABS. LYMPHOCYTES 0.4 (L) 0.5 - 4.6 K/UL    ABS. MONOCYTES 0.4 0.1 - 1.3 K/UL    ABS. EOSINOPHILS 0.0 0.0 - 0.8 K/UL    ABS. BASOPHILS 0.0 0.0 - 0.2 K/UL    ABS. IMM. GRANS. 0.1 0.0 - 0.5 K/UL   GLUCOSE, POC    Collection Time: 01/06/21  7:28 AM   Result Value Ref Range    Glucose (POC) 275 (H) 65 - 100 mg/dL   POC G3    Collection Time: 01/06/21 10:51 AM   Result Value Ref Range    Device: Non rebreather      FIO2 (POC) 100 %    pH (POC) 7.38 7.35 - 7.45      pCO2 (POC) 44.6 35 - 45 MMHG    pO2 (POC) 55 (L) 75 - 100 MMHG    HCO3 (POC) 26.4 (H) 22 - 26 MMOL/L    sO2 (POC) 87 (L) 95 - 98 %    Base excess (POC) 1 mmol/L    Allens test (POC) YES      Site RIGHT RADIAL      Specimen type (POC) ARTERIAL      Performed by [email or web address]     CO2, POC 28 MMOL/L    Flow rate (POC) 60.000 L/min    Respiratory comment: NurseNotified     COLLECT TIME 1,042     GLUCOSE, POC    Collection Time: 01/06/21 11:30 AM   Result Value Ref Range    Glucose (POC) 372 (H) 65 - 100 mg/dL       SARS-CoV-2 Lab Results  \"Novel Coronavirus\" Test: No results found for: COV2NT   \"Emergent Disease\" Test: No results found for: EDPR  \"SARS-COV-2\" Test: No results found for: XGCOVT  \"Precision Labs\" Test: No results found for: RSLT  Rapid Test:   Lab Results   Component Value Date/Time    COVR Detected (AA) 12/31/2020 05:49 AM           Imaging:  Xr Chest Port    Result Date: 12/31/2020  EXAM: Chest x-ray. INDICATION: Dyspnea. COMPARISON: Prior chest x-ray on April 9, 2019. TECHNIQUE: 2 frontal views of the chest were obtained. FINDINGS: The lungs are hyperexpanded, consistent with the reported history of COPD. There is new mild infiltrate in the right lung base. The left lung is clear. The heart is borderline enlarged. The mediastinal contour and pulmonary vasculature are normal. No pneumothorax or pleural effusion is seen. There are degenerative changes in the spine. IMPRESSION: 1. Mild right lung base infiltrate, suspect for acute pneumonia. 2. COPD. No results found for this visit on 12/31/20. Cultures:   All Micro Results     Procedure Component Value Units Date/Time    CULTURE, BLOOD [[id number]] Collected: 01/02/21 0843    Order Status: Completed Specimen: Blood Updated: 01/06/21 0851     Special Requests: --        RIGHT  Antecubital       Culture result: NO GROWTH 4 DAYS       CULTURE, BLOOD [[id number]] Collected: 01/02/21 0848    Order Status: Completed Specimen: Blood Updated: 01/06/21 0851     Special Requests: --        LEFT  Antecubital       Culture result: NO GROWTH 4 DAYS       CULTURE, BLOOD [[id number]] Collected: 12/31/20 0309    Order Status: Completed Specimen: Blood Updated: 01/05/21 1038     Special Requests: --        RIGHT  HAND       Culture result: NO GROWTH 5 DAYS       CULTURE, BLOOD [[id number]]  (Abnormal) Collected: 12/31/20 0549    Order Status: Completed Specimen: Blood Updated: 01/03/21 0736     Special Requests: --        NO SPECIAL REQUESTS  RIGHT  FOREARM       GRAM STAIN GRAM POSITIVE COCCI         AEROBIC BOTTLE POSITIVE               RESULTS VERIFIED, PHONED TO AND READ BACK BY GALLITO DSOUZA @ 0451 1/1/21 MM           Culture result:       ALPHA STREPTOCOCCUS, NOT S. PNEUMONIAE                  STAPHYLOCOCCUS SPECIES, COAGULASE NEGATIVE            REFER TO Leopoldo Allen [id number]            THIS ORGANISM MAY BE INDICATIVE OF CULTURE CONTAMINATION, HOWEVER, CLINICAL CORRELATION NEEDS TO BE EVALUATED, AS EACH CASE IS UNIQUE. BLOOD CULTURE ID PANEL [[id number]]  (Abnormal) Collected: 12/31/20 0549    Order Status: Completed Specimen: Blood Updated: 01/01/21 0801     Acc. no. from Micro Order [id number]     Staphylococcus Detected        Comment: RESULTS VERIFIED, PHONED TO AND READ BACK BY  GALLITO DSOUZA @ 0451 1/1/21 MM          Streptococcus Detected        Comment: RESULTS VERIFIED, PHONED TO AND READ BACK BY  GALLITO DSOUZA @ 0451 1/1/21 MM          mecA (Methicillin-Resistance Genes) NOT DETECTED        INTERPRETATION       Multiple organisms detected. Results do not replace susceptibility testing.                 Assessment/Plan:       Acute respiratory failure with hypoxia secondary to COVID-19 infection:  - On airvo 60 L   - High risk for deterioration requiring BiPAP or intubation  - Continue Decadron  - Refused Remdesivir and convalescent plasma  - Continue home aerosols  - Continue Ceftriaxone + Doxy  - Wean oxygen as appropriate      Monomorphic ventricular tachycardia:  - Patient found to have monomorphic VT with EMS, converted on his own  - Given Mag in ER  - Cardiology saw him in ER and recommended correcting electrolytes  - Start Amio if it happens again  - Cardiology signed off, didn't write a note?       Bacteremia due to gram positive bacteria:  - Initial blood cultures with strep + staph but both common contaminants  - Continue Ceftriaxone + Doxy since improving  - Repeat cultures NGTD   - If persistent, will need ECHO      AGUSTIN:  - Resolved      Elevated troponin:  - Troponin 72 --> 425 --> 391  - No acute CP  - ?COVID related vs VT  - Repeat troponin until trending down  - Continue ASA + Plavix  - Cardiology assess in ER(?) -->  Previous attending has spoken to them and they think it's COVID      DM (diabetes mellitus) type II:  - A1C on 12/16/20 was 6.8  - Blood sugars in 300s, patient is on steroids   - Increase Lantus to 35 units   - continue Humalog SSI      Coronary artery disease:  - Stable  - Continue ASA + Plavix  - Continue Crestor      COPD:  - No acute wheezing  - Continue home aerosols      PAD:  - Continue ASA + Plavix      Hyperlipidemia:   - Continue Crestor    DIET DIABETIC CONSISTENT CARB Regular  DIET NUTRITIONAL SUPPLEMENTS All Meals; Luzmaria Johnston ( )    DVT Prophylaxis: Heparin    Discharge Plan: TBD      Signed By: Luis Alberto Pittman MD     January 6, 2021

## 2021-01-06 NOTE — DIABETES MGMT
Patient's blood glucose ranged 262-363 yesterday with patient receiving Lantus 26 units, Humalog 43 units, and dexamethasone 6 mg. Blood glucose 275 this morning and 372 at lunch. Spoke with provider and order received to initiate prandial insulin at 10 units with meals starting with supper. Lantus was increased this morning by provider. Current regimen: Lantus 35 units daily, Humalog 10 units with meals,Humalog SSI, and dexamethasone 6 mg.

## 2021-01-06 NOTE — PROGRESS NOTES
RNCM reviewed chart reviewed for discharge planning. Pt 02 demands remain high. Currently heated high flow NC 60L   Pt will need a PT and OT eval when medically stable. PPD ordered for discharge planning. HH vs [facility]?   Will continue to follow for discharge planning needs  Please consult  if any new issues arise

## 2021-01-06 NOTE — PROGRESS NOTES
SPEECH THERAPY SCREENING NOTE:    Chart review completed as part of Speech Therapy screening. Patient currently with high o2 demands, airvo 60L 95%, which could increase risk of aspiration. If MD feels appropriate, please consider speech therapy consult for dysphagia assessment as patient may benefit from modifications and/or strategy training given current respiratory status.        Thank you for your consideration,  Stevenson Gu[address] CCC-SLP

## 2021-01-06 NOTE — PROGRESS NOTES
Informed Dr. Rajendra Hanson of patient's blood sugar of 372. Informed to give an additional 6 units to the 10 units we will give per protocol.

## 2021-01-06 NOTE — PROGRESS NOTES
Shift Assessment. Pt is A&O x 4. Pt cardiac rhythm is regular. Pt lungs are coarse. Pt is on airvo 60L at 95% with a non rebreather mask. Bowel sounds active. Pt denies pain or nausea. Pt is resting in bed with the bed in the lowest position and the call light within reach.

## 2021-01-06 NOTE — PROGRESS NOTES
Physician Progress Note      PATIENT:               Jason Cruz  [facility] #:                  [id number]  :                       1945  ADMIT DATE:       2020 1:18 AM  [address] [place] DATE:  RESPONDING  PROVIDER #:        aKti August MD          QUERY TEXT:    Pt admitted with COVID-19. If possible, please document in progress notes and discharge summary further specificity regarding the type of encephalopathy:      The medical record reflects the following:  Risk Factors: Resp failure, PNA, AGUSTIN  Clinical Indicators: \"confused and agitated\" \" keeps pulling 02 off\" \" thinks he is at home\"  Treatment: serial labs, monitoring patient, restraints  Options provided:  -- Metabolic encephalopathy  -- No  encephalopathy  -- Encephalopathy due to  -- Other - I will add my own diagnosis  -- Disagree - Not applicable / Not valid  -- Disagree - Clinically unable to determine / Unknown  -- Refer to Clinical Documentation Reviewer    PROVIDER RESPONSE TEXT:    This patient has metabolic encephalopathy.     Query created by: Reggie Palomares on 2021 3:39 PM      Electronically signed by:  Kati August MD 2021 7:04 AM

## 2021-01-07 LAB
ALBUMIN SERPL-MCNC: 2.6 G/DL (ref 3.2–4.6)
ALBUMIN/GLOB SERPL: 0.7 {RATIO} (ref 1.2–3.5)
ALP SERPL-CCNC: 55 U/L (ref 50–136)
ALT SERPL-CCNC: 87 U/L (ref 12–65)
ANION GAP SERPL CALC-SCNC: 3 MMOL/L (ref 7–16)
AST SERPL-CCNC: 68 U/L (ref 15–37)
BACTERIA SPEC CULT: NORMAL
BACTERIA SPEC CULT: NORMAL
BASOPHILS # BLD: 0 K/UL (ref 0–0.2)
BASOPHILS NFR BLD: 0 % (ref 0–2)
BILIRUB SERPL-MCNC: 0.4 MG/DL (ref 0.2–1.1)
BUN SERPL-MCNC: 48 MG/DL (ref 8–23)
CALCIUM SERPL-MCNC: 8.3 MG/DL (ref 8.3–10.4)
CHLORIDE SERPL-SCNC: 100 MMOL/L (ref 98–107)
CO2 SERPL-SCNC: 31 MMOL/L (ref 21–32)
CREAT SERPL-MCNC: 1 MG/DL (ref 0.8–1.5)
DIFFERENTIAL METHOD BLD: ABNORMAL
EOSINOPHIL # BLD: 0 K/UL (ref 0–0.8)
EOSINOPHIL NFR BLD: 0 % (ref 0.5–7.8)
ERYTHROCYTE [DISTWIDTH] IN BLOOD BY AUTOMATED COUNT: 14.4 % (ref 11.9–14.6)
GLOBULIN SER CALC-MCNC: 3.7 G/DL (ref 2.3–3.5)
GLUCOSE BLD STRIP.AUTO-MCNC: 201 MG/DL (ref 65–100)
GLUCOSE BLD STRIP.AUTO-MCNC: 213 MG/DL (ref 65–100)
GLUCOSE BLD STRIP.AUTO-MCNC: 297 MG/DL (ref 65–100)
GLUCOSE BLD STRIP.AUTO-MCNC: 331 MG/DL (ref 65–100)
GLUCOSE SERPL-MCNC: 201 MG/DL (ref 65–100)
HCT VFR BLD AUTO: 32.1 % (ref 41.1–50.3)
HGB BLD-MCNC: 10.5 G/DL (ref 13.6–17.2)
IMM GRANULOCYTES # BLD AUTO: 0.1 K/UL (ref 0–0.5)
IMM GRANULOCYTES NFR BLD AUTO: 2 % (ref 0–5)
LYMPHOCYTES # BLD: 0.4 K/UL (ref 0.5–4.6)
LYMPHOCYTES NFR BLD: 8 % (ref 13–44)
MCH RBC QN AUTO: 32.4 PG (ref 26.1–32.9)
MCHC RBC AUTO-ENTMCNC: 32.7 G/DL (ref 31.4–35)
MCV RBC AUTO: 99.1 FL (ref 79.6–97.8)
MM INDURATION POC: 0 MM (ref 0–5)
MONOCYTES # BLD: 0.5 K/UL (ref 0.1–1.3)
MONOCYTES NFR BLD: 9 % (ref 4–12)
NEUTS SEG # BLD: 4.5 K/UL (ref 1.7–8.2)
NEUTS SEG NFR BLD: 81 % (ref 43–78)
NRBC # BLD: 0 K/UL (ref 0–0.2)
PLATELET # BLD AUTO: 143 K/UL (ref 150–450)
PLATELET COMMENTS,PCOM: ADEQUATE
PMV BLD AUTO: 11.7 FL (ref 9.4–12.3)
POTASSIUM SERPL-SCNC: 4.7 MMOL/L (ref 3.5–5.1)
PPD POC: NEGATIVE NEGATIVE
PROT SERPL-MCNC: 6.3 G/DL (ref 6.3–8.2)
RBC # BLD AUTO: 3.24 M/UL (ref 4.23–5.6)
RBC MORPH BLD: ABNORMAL
SERVICE CMNT-IMP: NORMAL
SERVICE CMNT-IMP: NORMAL
SODIUM SERPL-SCNC: 134 MMOL/L (ref 138–145)
WBC # BLD AUTO: 5.5 K/UL (ref 4.3–11.1)
WBC MORPH BLD: ABNORMAL

## 2021-01-07 PROCEDURE — 94760 N-INVAS EAR/PLS OXIMETRY 1: CPT

## 2021-01-07 PROCEDURE — 94640 AIRWAY INHALATION TREATMENT: CPT

## 2021-01-07 PROCEDURE — 97530 THERAPEUTIC ACTIVITIES: CPT

## 2021-01-07 PROCEDURE — 82962 GLUCOSE BLOOD TEST: CPT

## 2021-01-07 PROCEDURE — 94660 CPAP INITIATION&MGMT: CPT

## 2021-01-07 PROCEDURE — 74011250637 HC RX REV CODE- 250/637: Performed by: INTERNAL MEDICINE

## 2021-01-07 PROCEDURE — 2709999900 HC NON-CHARGEABLE SUPPLY

## 2021-01-07 PROCEDURE — 77010033711 HC HIGH FLOW OXYGEN

## 2021-01-07 PROCEDURE — 74011250636 HC RX REV CODE- 250/636: Performed by: INTERNAL MEDICINE

## 2021-01-07 PROCEDURE — 74011636637 HC RX REV CODE- 636/637: Performed by: INTERNAL MEDICINE

## 2021-01-07 PROCEDURE — 74011636637 HC RX REV CODE- 636/637: Performed by: FAMILY MEDICINE

## 2021-01-07 PROCEDURE — 65270000029 HC RM PRIVATE

## 2021-01-07 PROCEDURE — 94762 N-INVAS EAR/PLS OXIMTRY CONT: CPT

## 2021-01-07 PROCEDURE — 36415 COLL VENOUS BLD VENIPUNCTURE: CPT

## 2021-01-07 PROCEDURE — 97110 THERAPEUTIC EXERCISES: CPT

## 2021-01-07 PROCEDURE — 85025 COMPLETE CBC W/AUTO DIFF WBC: CPT

## 2021-01-07 PROCEDURE — 80053 COMPREHEN METABOLIC PANEL: CPT

## 2021-01-07 RX ORDER — INSULIN LISPRO 100 [IU]/ML
15 INJECTION, SOLUTION INTRAVENOUS; SUBCUTANEOUS
Status: DISCONTINUED | OUTPATIENT
Start: 2021-01-07 | End: 2021-01-11

## 2021-01-07 RX ORDER — INSULIN GLARGINE 100 [IU]/ML
45 INJECTION, SOLUTION SUBCUTANEOUS DAILY
Status: DISCONTINUED | OUTPATIENT
Start: 2021-01-07 | End: 2021-01-08

## 2021-01-07 RX ORDER — INSULIN LISPRO 100 [IU]/ML
15 INJECTION, SOLUTION INTRAVENOUS; SUBCUTANEOUS
Status: DISCONTINUED | OUTPATIENT
Start: 2021-01-07 | End: 2021-01-07

## 2021-01-07 RX ADMIN — INSULIN LISPRO 15 UNITS: 100 INJECTION, SOLUTION INTRAVENOUS; SUBCUTANEOUS at 16:53

## 2021-01-07 RX ADMIN — ROSUVASTATIN 10 MG: 10 TABLET, FILM COATED ORAL at 22:00

## 2021-01-07 RX ADMIN — INSULIN LISPRO 15 UNITS: 100 INJECTION, SOLUTION INTRAVENOUS; SUBCUTANEOUS at 11:57

## 2021-01-07 RX ADMIN — AMLODIPINE BESYLATE: 10 TABLET ORAL at 08:55

## 2021-01-07 RX ADMIN — Medication 10 ML: at 22:00

## 2021-01-07 RX ADMIN — ALBUTEROL SULFATE 2 PUFF: 108 INHALANT RESPIRATORY (INHALATION) at 20:25

## 2021-01-07 RX ADMIN — BUDESONIDE AND FORMOTEROL FUMARATE DIHYDRATE 2 PUFF: 160; 4.5 AEROSOL RESPIRATORY (INHALATION) at 08:00

## 2021-01-07 RX ADMIN — HEPARIN SODIUM 5000 UNITS: 5000 INJECTION INTRAVENOUS; SUBCUTANEOUS at 08:56

## 2021-01-07 RX ADMIN — Medication 10 ML: at 14:14

## 2021-01-07 RX ADMIN — ALBUTEROL SULFATE 2 PUFF: 108 INHALANT RESPIRATORY (INHALATION) at 08:00

## 2021-01-07 RX ADMIN — INSULIN LISPRO 6 UNITS: 100 INJECTION, SOLUTION INTRAVENOUS; SUBCUTANEOUS at 16:53

## 2021-01-07 RX ADMIN — ASPIRIN 81 MG: 81 TABLET, COATED ORAL at 22:00

## 2021-01-07 RX ADMIN — DEXAMETHASONE 6 MG: 4 TABLET ORAL at 08:55

## 2021-01-07 RX ADMIN — HEPARIN SODIUM 5000 UNITS: 5000 INJECTION INTRAVENOUS; SUBCUTANEOUS at 16:54

## 2021-01-07 RX ADMIN — MONTELUKAST SODIUM 10 MG: 10 TABLET, FILM COATED ORAL at 22:01

## 2021-01-07 RX ADMIN — INSULIN LISPRO 15 UNITS: 100 INJECTION, SOLUTION INTRAVENOUS; SUBCUTANEOUS at 08:59

## 2021-01-07 RX ADMIN — LORATADINE 10 MG: 10 TABLET ORAL at 08:56

## 2021-01-07 RX ADMIN — INSULIN LISPRO 4 UNITS: 100 INJECTION, SOLUTION INTRAVENOUS; SUBCUTANEOUS at 08:59

## 2021-01-07 RX ADMIN — CLOPIDOGREL BISULFATE 75 MG: 75 TABLET ORAL at 08:56

## 2021-01-07 RX ADMIN — INSULIN LISPRO 8 UNITS: 100 INJECTION, SOLUTION INTRAVENOUS; SUBCUTANEOUS at 22:01

## 2021-01-07 RX ADMIN — HYDROCHLOROTHIAZIDE 12.5 MG: 25 TABLET ORAL at 08:56

## 2021-01-07 RX ADMIN — HEPARIN SODIUM 5000 UNITS: 5000 INJECTION INTRAVENOUS; SUBCUTANEOUS at 23:31

## 2021-01-07 RX ADMIN — INSULIN GLARGINE 45 UNITS: 100 INJECTION, SOLUTION SUBCUTANEOUS at 09:00

## 2021-01-07 RX ADMIN — Medication 10 ML: at 06:07

## 2021-01-07 RX ADMIN — INSULIN LISPRO 4 UNITS: 100 INJECTION, SOLUTION INTRAVENOUS; SUBCUTANEOUS at 11:56

## 2021-01-07 RX ADMIN — ALBUTEROL SULFATE 2 PUFF: 108 INHALANT RESPIRATORY (INHALATION) at 14:00

## 2021-01-07 RX ADMIN — MAGNESIUM GLUCONATE 500 MG ORAL TABLET 400 MG: 500 TABLET ORAL at 08:56

## 2021-01-07 NOTE — PROGRESS NOTES
Hospitalist Progress Note    Patient: Saranya Navarro MRN: [id number]  SSN: [id number]    YOB: 1945  Age: 67 y.o. Sex: male      Admit Date: 12/31/2020    LOS: 7 days     Subjective:   66 yo CM with past history of CAD s/p stents, ischemic cardiomyopathy, PAD (on Plavix and cilostazo), COPD, DM type II, HTN, HLD presents via EMS after fall at home and was admitted due to acute respiratory failure due to COVID and had an episode of VTach. 1/7: Patient is on 60 L Airvo. Reports he is feeling better today. He was on BiPAP at night and tolerated well. He reports he he had a sound sleep at night. Shortness of breath has been improved. Denies chest pain, nausea, vomiting, abdominal pain. I tried to call wife again today. No answer. Review of systems negative except stated above. Objective:     Visit Vitals  BP (!) 127/52 (BP 1 Location: Right arm, BP Patient Position: At rest)   Pulse 83   Temp 98.1 °F (36.7 °C)   Resp 18   Ht 6' 1\" (1.854 m)   Wt 131.1 kg (289 lb)   SpO2 93%   BMI 38.13 kg/m²      Oxygen Therapy  O2 Sat (%): 93 % (01/07/21 1605)  Pulse via Oximetry: 90 beats per minute (01/07/21 1605)  O2 Device: Heated; Hi flow nasal cannula (01/07/21 1605)  O2 Flow Rate (L/min): 60 l/min (01/07/21 1605)  O2 Temperature: 87.8 °F (31 °C) (01/07/21 1605)  FIO2 (%): 100 % (01/07/21 1605)      Intake and Output:     Intake/Output Summary (Last 24 hours) at 1/7/2021 1617  Last data filed at 1/7/2021 0025  Gross per 24 hour   Intake    Output 825 ml   Net -825 ml         Physical Exam:   GENERAL: alert, cooperative, moderate resp distress, appears stated age  EYE: conjunctivae/corneas clear. PERRL. THROAT & NECK: normal and no erythema or exudates noted. LUNG: clear to auscultation bilaterally  HEART: regular rate and rhythm, S1S2, no murmur, no JVD  ABDOMEN: soft, non-tender, non-distended.  Bowel sounds normal.   EXTREMITIES:  No edema, 2+ pedal/radial pulses bilaterally  SKIN: no rash or abnormalities  NEUROLOGIC: A&Ox3. Cranial nerves 2-12 grossly intact. Lab/Data Review:  Recent Results (from the past 24 hour(s))   GLUCOSE, POC    Collection Time: 01/06/21  4:52 PM   Result Value Ref Range    Glucose (POC) 398 (H) 65 - 100 mg/dL   GLUCOSE, POC    Collection Time: 01/06/21  8:29 PM   Result Value Ref Range    Glucose (POC) 392 (H) 65 - 100 mg/dL   GLUCOSE, POC    Collection Time: 01/07/21  7:56 AM   Result Value Ref Range    Glucose (POC) 213 (H) 65 - 100 mg/dL   GLUCOSE, POC    Collection Time: 01/07/21 11:40 AM   Result Value Ref Range    Glucose (POC) 201 (H) 65 - 044 mg/dL   METABOLIC PANEL, COMPREHENSIVE    Collection Time: 01/07/21 11:56 AM   Result Value Ref Range    Sodium 134 (L) 138 - 145 mmol/L    Potassium 4.7 3.5 - 5.1 mmol/L    Chloride 100 98 - 107 mmol/L    CO2 31 21 - 32 mmol/L    Anion gap 3 (L) 7 - 16 mmol/L    Glucose 201 (H) 65 - 100 mg/dL    BUN 48 (H) 8 - 23 MG/DL    Creatinine 1.00 0.8 - 1.5 MG/DL    GFR est AA >60 >60 ml/min/1.73m2    GFR est non-AA >60 >60 ml/min/1.73m2    Calcium 8.3 8.3 - 10.4 MG/DL    Bilirubin, total 0.4 0.2 - 1.1 MG/DL    ALT (SGPT) 87 (H) 12 - 65 U/L    AST (SGOT) 68 (H) 15 - 37 U/L    Alk.  phosphatase 55 50 - 136 U/L    Protein, total 6.3 6.3 - 8.2 g/dL    Albumin 2.6 (L) 3.2 - 4.6 g/dL    Globulin 3.7 (H) 2.3 - 3.5 g/dL    A-G Ratio 0.7 (L) 1.2 - 3.5     CBC WITH AUTOMATED DIFF    Collection Time: 01/07/21 11:56 AM   Result Value Ref Range    WBC 5.5 4.3 - 11.1 K/uL    RBC 3.24 (L) 4.23 - 5.6 M/uL    HGB 10.5 (L) 13.6 - 17.2 g/dL    HCT 32.1 (L) 41.1 - 50.3 %    MCV 99.1 (H) 79.6 - 97.8 FL    MCH 32.4 26.1 - 32.9 PG    MCHC 32.7 31.4 - 35.0 g/dL    RDW 14.4 11.9 - 14.6 %    PLATELET 915 (L) 128 - 450 K/uL    MPV 11.7 9.4 - 12.3 FL    ABSOLUTE NRBC 0.00 0.0 - 0.2 K/uL    NEUTROPHILS 81 (H) 43 - 78 %    LYMPHOCYTES 8 (L) 13 - 44 %    MONOCYTES 9 4.0 - 12.0 %    EOSINOPHILS 0 (L) 0.5 - 7.8 %    BASOPHILS 0 0.0 - 2.0 %    IMMATURE GRANULOCYTES 2 0.0 - 5.0 %    ABS. NEUTROPHILS 4.5 1.7 - 8.2 K/UL    ABS. LYMPHOCYTES 0.4 (L) 0.5 - 4.6 K/UL    ABS. MONOCYTES 0.5 0.1 - 1.3 K/UL    ABS. EOSINOPHILS 0.0 0.0 - 0.8 K/UL    ABS. BASOPHILS 0.0 0.0 - 0.2 K/UL    ABS. IMM. GRANS. 0.1 0.0 - 0.5 K/UL    RBC COMMENTS SLIGHT  ANISOCYTOSIS + POIKILOCYTOSIS        WBC COMMENTS Result Confirmed By Smear      PLATELET COMMENTS ADEQUATE      DF AUTOMATED         SARS-CoV-2 Lab Results  \"Novel Coronavirus\" Test: No results found for: COV2NT   \"Emergent Disease\" Test: No results found for: EDPR  \"SARS-COV-2\" Test: No results found for: XGCOVT  \"[facility]\" Test: No results found for: RSLT  Rapid Test:   Lab Results   Component Value Date/Time    COVR Detected (AA) 12/31/2020 05:49 AM           Imaging:  Xr Chest Port    Result Date: 12/31/2020  EXAM: Chest x-ray. INDICATION: Dyspnea. COMPARISON: Prior chest x-ray on April 9, 2019. TECHNIQUE: 2 frontal views of the chest were obtained. FINDINGS: The lungs are hyperexpanded, consistent with the reported history of COPD. There is new mild infiltrate in the right lung base. The left lung is clear. The heart is borderline enlarged. The mediastinal contour and pulmonary vasculature are normal. No pneumothorax or pleural effusion is seen. There are degenerative changes in the spine. IMPRESSION: 1. Mild right lung base infiltrate, suspect for acute pneumonia. 2. COPD. No results found for this visit on 12/31/20. Cultures:   All Micro Results     Procedure Component Value Units Date/Time    CULTURE, BLOOD [[id number]] Collected: 01/02/21 0843    Order Status: Completed Specimen: Blood Updated: 01/07/21 1045     Special Requests: --        RIGHT  Antecubital       Culture result: NO GROWTH 5 DAYS       CULTURE, BLOOD [[id number]] Collected: 01/02/21 0848    Order Status: Completed Specimen: Blood Updated: 01/07/21 1045     Special Requests: --        LEFT  Antecubital       Culture result: NO GROWTH 5 DAYS CULTURE, BLOOD [[id number]] Collected: 12/31/20 0309    Order Status: Completed Specimen: Blood Updated: 01/05/21 1038     Special Requests: --        RIGHT  HAND       Culture result: NO GROWTH 5 DAYS       CULTURE, BLOOD [[id number]]  (Abnormal) Collected: 12/31/20 0549    Order Status: Completed Specimen: Blood Updated: 01/03/21 0736     Special Requests: --        NO SPECIAL REQUESTS  RIGHT  FOREARM       GRAM STAIN GRAM POSITIVE COCCI         AEROBIC BOTTLE POSITIVE               RESULTS VERIFIED, PHONED TO AND READ BACK BY GALLITO DSOUZA @ 0451 1/1/21 MM           Culture result:       ALPHA STREPTOCOCCUS, NOT S. PNEUMONIAE                  STAPHYLOCOCCUS SPECIES, COAGULASE NEGATIVE            REFER TO Richi Batista [id number]            THIS ORGANISM MAY BE INDICATIVE OF CULTURE CONTAMINATION, HOWEVER, CLINICAL CORRELATION NEEDS TO BE EVALUATED, AS EACH CASE IS UNIQUE. BLOOD CULTURE ID PANEL [[id number]]  (Abnormal) Collected: 12/31/20 0549    Order Status: Completed Specimen: Blood Updated: 01/01/21 0801     Acc. no. from Micro Order [id number]     Staphylococcus Detected        Comment: RESULTS VERIFIED, PHONED TO AND READ BACK BY  GALLITO DSOUZA @ 0451 1/1/21 MM          Streptococcus Detected        Comment: RESULTS VERIFIED, PHONED TO AND READ BACK BY  GALLITO DSOUZA @ 0451 1/1/21 MM          mecA (Methicillin-Resistance Genes) NOT DETECTED        INTERPRETATION       Multiple organisms detected. Results do not replace susceptibility testing.                 Assessment/Plan:       Acute respiratory failure with hypoxia secondary to COVID-19 infection:  - On airvo 60 L   - High risk for deterioration requiring BiPAP or intubation  - Continue Decadron  - Refused Remdesivir and convalescent plasma  - Continue home aerosols  - Continue Ceftriaxone + Doxy  - Wean oxygen as appropriate      Monomorphic ventricular tachycardia:  - Patient found to have monomorphic VT with EMS, converted on his own  - Given Mag in ER  - Cardiology saw him in ER and recommended correcting electrolytes  - Start Amio if it happens again  - Cardiology signed off, didn't write a note?       Bacteremia due to gram positive bacteria:  - Initial blood cultures with strep + staph but both common contaminants  - Continue Ceftriaxone + Doxy since improving  - Repeat cultures NGTD   - If persistent, will need ECHO      AGUSTIN:  - Resolved      Elevated troponin:  - Troponin 72 --> 425 --> 391  - No acute CP  - ?COVID related vs VT  - Repeat troponin until trending down  - Continue ASA + Plavix  - Cardiology assess in ER(?) -->  Previous attending has spoken to them and they think it's COVID      DM (diabetes mellitus) type II:  - A1C on 12/16/20 was 6.8  - Blood sugars in 300s, patient is on steroids   - Increase Lantus to 35 units   - continue Humalog SSI      Coronary artery disease:  - Stable  - Continue ASA + Plavix  - Continue Crestor      COPD:  - No acute wheezing  - Continue home aerosols      PAD:  - Continue ASA + Plavix      Hyperlipidemia:   - Continue Crestor    DIET DIABETIC CONSISTENT CARB Regular  DIET NUTRITIONAL SUPPLEMENTS All Meals; Luzmaria Johnston ( )    DVT Prophylaxis: Heparin    Discharge Plan: TBD      Signed By: Stephanie Goana MD     January 7, 2021

## 2021-01-07 NOTE — DIABETES MGMT
Patient's blood glucose ranged 275-398 yesterday with patient receiving Lantus 35 units, Humalog 62 units, and dexamethasone 6 mg. Blood glucose 213 this morning. Provider has increased Lantus and Humalog today. New regimen: Lantus 45 units daily, Humalog 15 units with meals, and Humalog SSI. Dexamethasone currently scheduled to end 1/10. Will continue to follow.

## 2021-01-07 NOTE — PROGRESS NOTES
Problem: Diabetes Self-Management  Goal: *Disease process and treatment process  Description: Define diabetes and identify own type of diabetes; list 3 options for treating diabetes. Outcome: Progressing Towards Goal  Goal: *Incorporating nutritional management into lifestyle  Description: Describe effect of type, amount and timing of food on blood glucose; list 3 methods for planning meals. Outcome: Progressing Towards Goal  Goal: *Incorporating physical activity into lifestyle  Description: State effect of exercise on blood glucose levels. Outcome: Progressing Towards Goal  Goal: *Developing strategies to promote health/change behavior  Description: Define the ABC's of diabetes; identify appropriate screenings, schedule and personal plan for screenings. Outcome: Progressing Towards Goal  Goal: *Using medications safely  Description: State effect of diabetes medications on diabetes; name diabetes medication taking, action and side effects. Outcome: Progressing Towards Goal  Goal: *Monitoring blood glucose, interpreting and using results  Description: Identify recommended blood glucose targets  and personal targets. Outcome: Progressing Towards Goal  Goal: *Prevention, detection, treatment of acute complications  Description: List symptoms of hyper- and hypoglycemia; describe how to treat low blood sugar and actions for lowering  high blood glucose level. Outcome: Progressing Towards Goal  Goal: *Prevention, detection and treatment of chronic complications  Description: Define the natural course of diabetes and describe the relationship of blood glucose levels to long term complications of diabetes.   Outcome: Progressing Towards Goal  Goal: *Developing strategies to address psychosocial issues  Description: Describe feelings about living with diabetes; identify support needed and support network  Outcome: Progressing Towards Goal  Goal: *Insulin pump training  Outcome: Progressing Towards Goal  Goal: *Sick day guidelines  Outcome: Progressing Towards Goal  Goal: *Patient Specific Goal (EDIT GOAL, INSERT TEXT)  Outcome: Progressing Towards Goal     Problem: Patient Education: Go to Patient Education Activity  Goal: Patient/Family Education  Outcome: Progressing Towards Goal     Problem: Falls - Risk of  Goal: *Absence of Falls  Description: Document Evelyne Minium Fall Risk and appropriate interventions in the flowsheet. Outcome: Progressing Towards Goal  Note: Fall Risk Interventions:  Mobility Interventions: Patient to call before getting OOB         Medication Interventions: Teach patient to arise slowly    Elimination Interventions: Urinal in reach, Toileting schedule/hourly rounds, Call light in reach    History of Falls Interventions: Investigate reason for fall         Problem: Patient Education: Go to Patient Education Activity  Goal: Patient/Family Education  Outcome: Progressing Towards Goal     Problem: Pressure Injury - Risk of  Goal: *Prevention of pressure injury  Description: Document Hemant Scale and appropriate interventions in the flowsheet.   Outcome: Progressing Towards Goal  Note: Pressure Injury Interventions:  Sensory Interventions: Assess changes in LOC    Moisture Interventions: Absorbent underpads    Activity Interventions: Pressure redistribution bed/mattress(bed type)    Mobility Interventions: PT/OT evaluation    Nutrition Interventions: Document food/fluid/supplement intake    Friction and Shear Interventions: Apply protective barrier, creams and emollients                Problem: Patient Education: Go to Patient Education Activity  Goal: Patient/Family Education  Outcome: Progressing Towards Goal     Problem: Non-Violent Restraints  Goal: *Removal from restraints as soon as assessed to be safe  Outcome: Resolved/Met  Goal: *No harm/injury to patient while restraints in use  Outcome: Resolved/Met  Goal: *Patient's dignity will be maintained  Outcome: Resolved/Met  Goal: *Patient Specific Goal (EDIT GOAL, INSERT TEXT)  Outcome: Resolved/Met  Goal: Non-violent Restaints:Standard Interventions  Outcome: Resolved/Met  Goal: Non-violent Restraints:Patient Interventions  Outcome: Resolved/Met  Goal: Patient/Family Education  Outcome: Resolved/Met     Problem: Patient Education: Go to Patient Education Activity  Goal: Patient/Family Education  Outcome: Progressing Towards Goal

## 2021-01-07 NOTE — PROGRESS NOTES
Comprehensive Nutrition Assessment  This assessment was completed remotely. Type and Reason for Visit: Initial  [place] of stay    Nutrition Recommendations/Plan:    Continue current diet and ONS TID. Malnutrition Assessment:  Malnutrition Status: No malnutrition    Nutrition Assessment:   Nutrition History: PTA pt was eating normally with 3 or so meals a day. Patient reports consistent weight based on weighing himself twice daily PTA. Nutrition Background: PMH: CAD s/p stents, ischemic cardiomyopathy, PAD,COPD, DM type II, HTN, HLD. Presented after fall at home and was admitted due to acute respiratory failure due to [place] and had an episode of VTach. Daily Update:  Spoke to patient via phone d/t COVID isolation precautions. Patient reports intake of 25-50% since Monday due to loss of appetite, not finding food appealing, and difficulty breathing. Patient states that he is not called for his order every meal and thinks this would help him make selections that are appealing and he is more likely to eat. Patient reports he enjoys the Glucerna shakes provided with his meals and would like to try strawberry. Current Nutrition Therapies:  DIET DIABETIC CONSISTENT CARB Regular  DIET NUTRITIONAL SUPPLEMENTS All Meals; Glucerna Shake ( )    Current Intake:   Average Meal Intake: 51-75%(71% for 8 recorded meal in past 7 days) Average Supplement Intake: %(Pt reports drinking 100% of glucerna shakes at all 3 meals)      Anthropometric Measures:  Height: 6' 1\" (185.4 cm)  Current Body Wt: 131 kg (288 lb 12.8 oz), Weight source: Not specified (Admission weight 12/21/2020, unspecified source)  BMI: 38.1, Obese class 2 (BMI 35.0-39. 9)             Estimated Daily Nutrient Needs:  Energy (kcal/day): 5100-4649 kcal/day (Kcal/kg(15-18 kcal/kg), Weight Used: Admission(131.1 kg-unspecified source))  Protein (g/day):  g/day Weight Used: ((20% daily needs))      Nutrition Diagnosis:   · Inadequate oral intake related to (increased metabolic needs, difficulty breathing, loss of appetite) as evidenced by (s/s as noted above, current intake meeting 50-75% of needs)    Nutrition Interventions:   Food and/or Nutrient Delivery: Continue current diet, Continue oral nutrition supplement     Coordination of Nutrition Care: Continue to monitor while inpatient      Goals:     Active Goal: Increase intake to > 75% needs through current diet and ONS within one week    Nutrition Monitoring and Evaluation:      Food/Nutrient Intake Outcomes: Food and nutrient intake, Supplement intake       Discharge Planning:    Continue current diet, Continue oral nutrition supplement    Kathy Martinez, Dietetic Intern, [phone]    Disaster Mode active

## 2021-01-07 NOTE — PROGRESS NOTES
ACUTE PHYSICAL THERAPY GOALS:  (Developed with and agreed upon by patient and/or caregiver.)  STG:  (1.)Mr. Mariaelena Horn will move from supine to sit and sit to supine , scoot up and down and roll side to side with CONTACT GUARD ASSIST within 3 treatment day(s). (2.)Mr. Mariaelena Horn will transfer from bed to chair and chair to bed with CONTACT GUARD ASSIST using the least restrictive device within 3 treatment day(s). (3.)Mr. Mariaelena Horn will ambulate with CONTACT GUARD ASSIST for 40 feet with the least restrictive device within 3 treatment day(s). (4.)Mr. Mariaelena Horn will perform standing static and dynamic balance activities x 10 minutes with CONTACT GUARD ASSIST to improve safety within 3 treatment day(s). (5.)Mr. Mariaelena Horn will maintain stable vital signs throughout all functional mobility within 3 treatment days.     LTG:  (1.)Mr. Mariaelena Horn will move from supine to sit and sit to supine , scoot up and down and roll side to side in bed with SUPERVISION within 7 treatment day(s). (2.)Mr. Mariaelena Horn will transfer from bed to chair and chair to bed with SUPERVISION using the least restrictive device within 7 treatment day(s). (3.)Mr. Mariaelena Horn will ambulate with SUPERVISION for 100+ feet with the least restrictive device within 7 treatment day(s). (4.)Mr. Mariaelena Horn will perform standing static and dynamic balance activities x 15 minutes with SUPERVISION to improve safety within 7 treatment day(s). (5.)Mr. Mariaelena Horn will ambulate and/or perform functional activities for 15 consecutive minutes with stable vital signs and no rests required to improve activity tolerance within 7 treatment days.   ________________________________________________________________________________________________    PHYSICAL THERAPY: Daily Note and PM Treatment Day # 2    Edwardo Reno is a 67 y.o. male   PRIMARY DIAGNOSIS: Acute respiratory failure with hypoxia ([facility] [address]  CAP (community acquired pneumonia) [J18.9]         ASSESSMENT:     REHAB RECOMMENDATIONS: Flaco Jimenez's goals for this visit include:   Chief Complaint   Patient presents with     RECHECK     MRI results       He requests these members of his care team be copied on today's visit information: No primary care provider on file.      PCP: No primary care provider on file.    Referring Provider:  No referring provider defined for this encounter.    Chief Complaint   Patient presents with     RECHECK     MRI results       Initial There were no vitals taken for this visit. There is no height or weight on file to calculate BMI.  Medication Reconciliation: complete    Do you need any medication refills at today's visit? No    Twyla Benson LPN       CURRENT LEVEL OF FUNCTION:  (Most Recently Demonstrated)   Recommendation to date pending progress:  Setting:   Short-term Rehab  Equipment:    To Be Determined Bed Mobility:   Independent  Sit to Stand:   Independent  Transfers:   Independent  Gait/Mobility:   Independent     ASSESSMENT:  Mr. Mariaelena Horn presents with decreased activity tolerance, decreased strength and impaired balance which are impacting his ability to ambulate and perform transfers at his baseline. He is currently on AIRVO requiring 60 L/min 100% with non-rebreather to perform transfers/ambulation within room and moderate assist for bed mobility, transfers and ambulation with use of a walker. Multiple sit<>stand transfers, ambulate 15' around room and exercises performed this PM with improved safety and activity tolerance this session. Edwardo Reno is currently functioning below his baseline and would benefit from skilled PT during acute care stay to maximize safety and independence with functional mobility. SUBJECTIVE:   Mr. Mariaelena Horn states, \"Let's move! \"    SOCIAL HISTORY/ LIVING ENVIRONMENT: Patient lives in a 2 story home (basement) on the main level with his spouse. His son lives \"200 feet from me. \" He typically is independent with ambulation/ADLs and able to perform yard work at baseline, however does admit to balance challenges even at baseline and experience a fall prior to admission. Home Environment: Private residence  # Steps to Enter: 1  One/Two Story Residence: Two story, live on 1st floor  Living Alone: No  Support Systems: Spouse/Significant Other/Partner, Child(dileep)  OBJECTIVE:     PAIN: VITAL SIGNS: LINES/DRAINS:   Pre Treatment: Pain Screen  Pain Scale 1: Numeric (0 - 10)  Pain Intensity 1: 0  Post Treatment: 0/10 Vital Signs  O2 Sat (%): (!) 85 %(improved to >90% with addition of nonrebreather)  O2 Device: Heated; Hi flow nasal cannula  O2 Flow Rate (L/min): 60 l/min  FIO2 (%): 100 %   O2 Device: Heated, Hi flow nasal cannula     MOBILITY: I Mod I S SBA CGA Min Mod Max Total  NT x2 Comments:   Bed Mobility    Rolling [] [] [] [] [] [] [] [] [] [x] []    Supine to Sit [] [] [] [] [] [] [] [] [] [x] []    Scooting [] [] [] [] [x] [] [] [] [] [] []    Sit to Supine [] [] [] [] [] [] [] [] [] [x] []    Transfers    Sit to Stand [] [] [] [] [] [x] [] [] [] [] []    Bed to Chair [] [] [] [] [] [x] [] [] [] [] []    Stand to Sit [] [] [] [] [] [x] [] [] [] [] []    I=Independent, Mod I=Modified Independent, S=Supervision, SBA=Standby Assistance, CGA=Contact Guard Assistance,   Min=Minimal Assistance, Mod=Moderate Assistance, Max=Maximal Assistance, Total=Total Assistance, NT=Not Tested    GAIT: I Mod I S SBA CGA Min Mod Max Total  NT x2 Comments:   Level of Assistance [] [] [] [] [] [x] [] [] [] [] [] RN with chair follow   Distance 15'    DME Rolling Walker and Gait Belt    Gait Quality Decreased step clearance, increased trunk sway    I=Independent, Mod I=Modified Independent, S=Supervision, SBA=Standby Assistance, CGA=Contact Guard Assistance,   Min=Minimal Assistance, Mod=Moderate Assistance, Max=Maximal Assistance, Total=Total Assistance, NT=Not Tested    PLAN:   FREQUENCY/DURATION: PT Plan of Care: 3 times/week for duration of hospital stay or until stated goals are met, whichever comes first.  TREATMENT:     TREATMENT:   ($$ Therapeutic Activity: 23-37 mins  $$ Therapeutic Exercises: 8-22 mins    )  Therapeutic Activity (30 Minutes): Therapeutic activity included Scooting, Transfer Training, Ambulation on level ground and Standing balance to improve functional Mobility, Strength and Activity tolerance. Therapeutic Exercise (8 Minutes): Therapeutic exercises noted below to improve functional activity tolerance, AROM, strength and mobility.     Date:  01/07/21 Date:   Date:     Activity/Exercise Parameters Parameters Parameters   Ankle pumps x10 B A     LAQ x10 B A     Seated marching x10 B A                               AFTER TREATMENT POSITION/PRECAUTIONS:  Alarm Activated, Chair, Needs within reach and RN notified    INTERDISCIPLINARY COLLABORATION:  RN/PCT and PT/PTA    TOTAL TREATMENT DURATION:  PT Patient Time In/Time Out  Time In: 1320  Time Out: [facility] 58, PT, DPT

## 2021-01-08 LAB
ALBUMIN SERPL-MCNC: 2.5 G/DL (ref 3.2–4.6)
ALBUMIN/GLOB SERPL: 0.6 {RATIO} (ref 1.2–3.5)
ALP SERPL-CCNC: 64 U/L (ref 50–136)
ALT SERPL-CCNC: 77 U/L (ref 12–65)
ANION GAP SERPL CALC-SCNC: 3 MMOL/L (ref 7–16)
AST SERPL-CCNC: 50 U/L (ref 15–37)
BASOPHILS # BLD: 0 K/UL (ref 0–0.2)
BASOPHILS NFR BLD: 0 % (ref 0–2)
BILIRUB SERPL-MCNC: 0.4 MG/DL (ref 0.2–1.1)
BUN SERPL-MCNC: 36 MG/DL (ref 8–23)
CALCIUM SERPL-MCNC: 8.5 MG/DL (ref 8.3–10.4)
CHLORIDE SERPL-SCNC: 100 MMOL/L (ref 98–107)
CO2 SERPL-SCNC: 32 MMOL/L (ref 21–32)
CREAT SERPL-MCNC: 0.95 MG/DL (ref 0.8–1.5)
DIFFERENTIAL METHOD BLD: ABNORMAL
EOSINOPHIL # BLD: 0 K/UL (ref 0–0.8)
EOSINOPHIL NFR BLD: 0 % (ref 0.5–7.8)
ERYTHROCYTE [DISTWIDTH] IN BLOOD BY AUTOMATED COUNT: 14.3 % (ref 11.9–14.6)
GLOBULIN SER CALC-MCNC: 3.9 G/DL (ref 2.3–3.5)
GLUCOSE BLD STRIP.AUTO-MCNC: 212 MG/DL (ref 65–100)
GLUCOSE BLD STRIP.AUTO-MCNC: 258 MG/DL (ref 65–100)
GLUCOSE BLD STRIP.AUTO-MCNC: 260 MG/DL (ref 65–100)
GLUCOSE BLD STRIP.AUTO-MCNC: 94 MG/DL (ref 65–100)
GLUCOSE SERPL-MCNC: 212 MG/DL (ref 65–100)
HCT VFR BLD AUTO: 31.2 % (ref 41.1–50.3)
HGB BLD-MCNC: 10.6 G/DL (ref 13.6–17.2)
IMM GRANULOCYTES # BLD AUTO: 0.1 K/UL (ref 0–0.5)
IMM GRANULOCYTES NFR BLD AUTO: 2 % (ref 0–5)
LYMPHOCYTES # BLD: 0.5 K/UL (ref 0.5–4.6)
LYMPHOCYTES NFR BLD: 9 % (ref 13–44)
MCH RBC QN AUTO: 32.7 PG (ref 26.1–32.9)
MCHC RBC AUTO-ENTMCNC: 34 G/DL (ref 31.4–35)
MCV RBC AUTO: 96.3 FL (ref 79.6–97.8)
MONOCYTES # BLD: 0.5 K/UL (ref 0.1–1.3)
MONOCYTES NFR BLD: 9 % (ref 4–12)
NEUTS SEG # BLD: 4.6 K/UL (ref 1.7–8.2)
NEUTS SEG NFR BLD: 80 % (ref 43–78)
NRBC # BLD: 0 K/UL (ref 0–0.2)
PLATELET # BLD AUTO: 157 K/UL (ref 150–450)
PLATELET COMMENTS,PCOM: ADEQUATE
PMV BLD AUTO: 11.1 FL (ref 9.4–12.3)
POTASSIUM SERPL-SCNC: 4.9 MMOL/L (ref 3.5–5.1)
PROT SERPL-MCNC: 6.4 G/DL (ref 6.3–8.2)
RBC # BLD AUTO: 3.24 M/UL (ref 4.23–5.6)
RBC MORPH BLD: ABNORMAL
SODIUM SERPL-SCNC: 135 MMOL/L (ref 138–145)
WBC # BLD AUTO: 5.7 K/UL (ref 4.3–11.1)
WBC MORPH BLD: ABNORMAL

## 2021-01-08 PROCEDURE — 94660 CPAP INITIATION&MGMT: CPT

## 2021-01-08 PROCEDURE — 85025 COMPLETE CBC W/AUTO DIFF WBC: CPT

## 2021-01-08 PROCEDURE — 94640 AIRWAY INHALATION TREATMENT: CPT

## 2021-01-08 PROCEDURE — 74011250636 HC RX REV CODE- 250/636: Performed by: INTERNAL MEDICINE

## 2021-01-08 PROCEDURE — 82962 GLUCOSE BLOOD TEST: CPT

## 2021-01-08 PROCEDURE — 74011250637 HC RX REV CODE- 250/637: Performed by: INTERNAL MEDICINE

## 2021-01-08 PROCEDURE — 94762 N-INVAS EAR/PLS OXIMTRY CONT: CPT

## 2021-01-08 PROCEDURE — 74011636637 HC RX REV CODE- 636/637: Performed by: INTERNAL MEDICINE

## 2021-01-08 PROCEDURE — 65270000029 HC RM PRIVATE

## 2021-01-08 PROCEDURE — 80053 COMPREHEN METABOLIC PANEL: CPT

## 2021-01-08 PROCEDURE — 74011636637 HC RX REV CODE- 636/637: Performed by: FAMILY MEDICINE

## 2021-01-08 RX ORDER — INSULIN GLARGINE 100 [IU]/ML
35 INJECTION, SOLUTION SUBCUTANEOUS DAILY
Status: DISCONTINUED | OUTPATIENT
Start: 2021-01-08 | End: 2021-01-11

## 2021-01-08 RX ADMIN — Medication 10 ML: at 21:23

## 2021-01-08 RX ADMIN — HEPARIN SODIUM 5000 UNITS: 5000 INJECTION INTRAVENOUS; SUBCUTANEOUS at 10:29

## 2021-01-08 RX ADMIN — HEPARIN SODIUM 5000 UNITS: 5000 INJECTION INTRAVENOUS; SUBCUTANEOUS at 23:27

## 2021-01-08 RX ADMIN — ROSUVASTATIN 10 MG: 10 TABLET, FILM COATED ORAL at 21:23

## 2021-01-08 RX ADMIN — ASPIRIN 81 MG: 81 TABLET, COATED ORAL at 21:23

## 2021-01-08 RX ADMIN — INSULIN LISPRO 15 UNITS: 100 INJECTION, SOLUTION INTRAVENOUS; SUBCUTANEOUS at 09:38

## 2021-01-08 RX ADMIN — INSULIN LISPRO 6 UNITS: 100 INJECTION, SOLUTION INTRAVENOUS; SUBCUTANEOUS at 21:36

## 2021-01-08 RX ADMIN — HEPARIN SODIUM 5000 UNITS: 5000 INJECTION INTRAVENOUS; SUBCUTANEOUS at 18:34

## 2021-01-08 RX ADMIN — Medication 10 ML: at 05:08

## 2021-01-08 RX ADMIN — DEXAMETHASONE 6 MG: 4 TABLET ORAL at 10:31

## 2021-01-08 RX ADMIN — AMLODIPINE BESYLATE: 10 TABLET ORAL at 10:29

## 2021-01-08 RX ADMIN — CLOPIDOGREL BISULFATE 75 MG: 75 TABLET ORAL at 10:29

## 2021-01-08 RX ADMIN — ALBUTEROL SULFATE 2 PUFF: 108 INHALANT RESPIRATORY (INHALATION) at 20:20

## 2021-01-08 RX ADMIN — MAGNESIUM GLUCONATE 500 MG ORAL TABLET 400 MG: 500 TABLET ORAL at 10:30

## 2021-01-08 RX ADMIN — INSULIN LISPRO 6 UNITS: 100 INJECTION, SOLUTION INTRAVENOUS; SUBCUTANEOUS at 18:34

## 2021-01-08 RX ADMIN — HYDROCHLOROTHIAZIDE 12.5 MG: 25 TABLET ORAL at 10:30

## 2021-01-08 RX ADMIN — INSULIN LISPRO 15 UNITS: 100 INJECTION, SOLUTION INTRAVENOUS; SUBCUTANEOUS at 18:35

## 2021-01-08 RX ADMIN — BUDESONIDE AND FORMOTEROL FUMARATE DIHYDRATE 2 PUFF: 160; 4.5 AEROSOL RESPIRATORY (INHALATION) at 20:20

## 2021-01-08 RX ADMIN — INSULIN GLARGINE 35 UNITS: 100 INJECTION, SOLUTION SUBCUTANEOUS at 09:38

## 2021-01-08 NOTE — PROGRESS NOTES
Hospitalist Progress Note    Patient: Kg Long MRN: [id number]  SSN: [id number]    YOB: 1945  Age: 67 y.o. Sex: male      Admit Date: 12/31/2020    LOS: 8 days     Subjective:   68 yo CM with past history of CAD s/p stents, ischemic cardiomyopathy, PAD (on Plavix and cilostazo), COPD, DM type II, HTN, HLD presents via EMS after fall at home and was admitted due to acute respiratory failure due to COVID and had an episode of VTach. 1/8: Patient is on 60 L Airvo. No change in the condition. Patient seems a little tired. No other active complaint. Review of systems negative except stated above. Objective:     Visit Vitals  /70 (BP 1 Location: Right arm, BP Patient Position: At rest)   Pulse 67   Temp 98 °F (36.7 °C)   Resp 20   Ht 6' 1\" (1.854 m)   Wt 131.1 kg (289 lb)   SpO2 97%   BMI 38.13 kg/m²      Oxygen Therapy  O2 Sat (%): 97 % (01/08/21 1540)  Pulse via Oximetry: 80 beats per minute (01/07/21 2305)  O2 Device: CPAP mask (01/07/21 2305)  O2 Flow Rate (L/min): 60 l/min (01/07/21 2029)  O2 Temperature: 88 °F (31.1 °C) (01/07/21 2029)  FIO2 (%): 100 % (01/07/21 2305)      Intake and Output:     Intake/Output Summary (Last 24 hours) at 1/8/2021 1639  Last data filed at 1/7/2021 2233  Gross per 24 hour   Intake    Output 1325 ml   Net -1325 ml         Physical Exam:   GENERAL: alert, cooperative, moderate resp distress, appears stated age  EYE: conjunctivae/corneas clear. PERRL. THROAT & NECK: normal and no erythema or exudates noted. LUNG: clear to auscultation bilaterally  HEART: regular rate and rhythm, S1S2, no murmur, no JVD  ABDOMEN: soft, non-tender, non-distended. Bowel sounds normal.   EXTREMITIES:  No edema, 2+ pedal/radial pulses bilaterally  SKIN: no rash or abnormalities  NEUROLOGIC: A&Ox3. Cranial nerves 2-12 grossly intact.     Lab/Data Review:  Recent Results (from the past 24 hour(s))   GLUCOSE, POC    Collection Time: 01/07/21  8:57 PM   Result Value Ref Range    Glucose (POC) 331 (H) 65 - 545 mg/dL   METABOLIC PANEL, COMPREHENSIVE    Collection Time: 01/08/21  5:12 AM   Result Value Ref Range    Sodium 135 (L) 138 - 145 mmol/L    Potassium 4.9 3.5 - 5.1 mmol/L    Chloride 100 98 - 107 mmol/L    CO2 32 21 - 32 mmol/L    Anion gap 3 (L) 7 - 16 mmol/L    Glucose 212 (H) 65 - 100 mg/dL    BUN 36 (H) 8 - 23 MG/DL    Creatinine 0.95 0.8 - 1.5 MG/DL    GFR est AA >60 >60 ml/min/1.73m2    GFR est non-AA >60 >60 ml/min/1.73m2    Calcium 8.5 8.3 - 10.4 MG/DL    Bilirubin, total 0.4 0.2 - 1.1 MG/DL    ALT (SGPT) 77 (H) 12 - 65 U/L    AST (SGOT) 50 (H) 15 - 37 U/L    Alk. phosphatase 64 50 - 136 U/L    Protein, total 6.4 6.3 - 8.2 g/dL    Albumin 2.5 (L) 3.2 - 4.6 g/dL    Globulin 3.9 (H) 2.3 - 3.5 g/dL    A-G Ratio 0.6 (L) 1.2 - 3.5     CBC WITH AUTOMATED DIFF    Collection Time: 01/08/21  5:12 AM   Result Value Ref Range    WBC 5.7 4.3 - 11.1 K/uL    RBC 3.24 (L) 4.23 - 5.6 M/uL    HGB 10.6 (L) 13.6 - 17.2 g/dL    HCT 31.2 (L) 41.1 - 50.3 %    MCV 96.3 79.6 - 97.8 FL    MCH 32.7 26.1 - 32.9 PG    MCHC 34.0 31.4 - 35.0 g/dL    RDW 14.3 11.9 - 14.6 %    PLATELET 139 394 - 541 K/uL    MPV 11.1 9.4 - 12.3 FL    ABSOLUTE NRBC 0.00 0.0 - 0.2 K/uL    NEUTROPHILS 80 (H) 43 - 78 %    LYMPHOCYTES 9 (L) 13 - 44 %    MONOCYTES 9 4.0 - 12.0 %    EOSINOPHILS 0 (L) 0.5 - 7.8 %    BASOPHILS 0 0.0 - 2.0 %    IMMATURE GRANULOCYTES 2 0.0 - 5.0 %    ABS. NEUTROPHILS 4.6 1.7 - 8.2 K/UL    ABS. LYMPHOCYTES 0.5 0.5 - 4.6 K/UL    ABS. MONOCYTES 0.5 0.1 - 1.3 K/UL    ABS. EOSINOPHILS 0.0 0.0 - 0.8 K/UL    ABS. BASOPHILS 0.0 0.0 - 0.2 K/UL    ABS. IMM.  GRANS. 0.1 0.0 - 0.5 K/UL    RBC COMMENTS SLIGHT  ANISOCYTOSIS + POIKILOCYTOSIS        WBC COMMENTS Result Confirmed By Smear      PLATELET COMMENTS ADEQUATE      DF AUTOMATED     GLUCOSE, POC    Collection Time: 01/08/21  7:58 AM   Result Value Ref Range    Glucose (POC) 94 65 - 100 mg/dL   GLUCOSE, POC    Collection Time: 01/08/21 12:43 PM Result Value Ref Range    Glucose (POC) 212 (H) 65 - 100 mg/dL       SARS-CoV-2 Lab Results  \"Novel Coronavirus\" Test: No results found for: COV2NT   \"Emergent Disease\" Test: No results found for: EDPR  \"SARS-COV-2\" Test: No results found for: XGCOVT  \"Precision Labs\" Test: No results found for: RSLT  Rapid Test:   Lab Results   Component Value Date/Time    COVR Detected (AA) 12/31/2020 05:49 AM           Imaging:  Xr Chest Port    Result Date: 12/31/2020  EXAM: Chest x-ray. INDICATION: Dyspnea. COMPARISON: Prior chest x-ray on April 9, 2019. TECHNIQUE: 2 frontal views of the chest were obtained. FINDINGS: The lungs are hyperexpanded, consistent with the reported history of COPD. There is new mild infiltrate in the right lung base. The left lung is clear. The heart is borderline enlarged. The mediastinal contour and pulmonary vasculature are normal. No pneumothorax or pleural effusion is seen. There are degenerative changes in the spine. IMPRESSION: 1. Mild right lung base infiltrate, suspect for acute pneumonia. 2. COPD. No results found for this visit on 12/31/20. Cultures:   All Micro Results     Procedure Component Value Units Date/Time    CULTURE, BLOOD [[id number]] Collected: 01/02/21 0843    Order Status: Completed Specimen: Blood Updated: 01/07/21 1045     Special Requests: --        RIGHT  Antecubital       Culture result: NO GROWTH 5 DAYS       CULTURE, BLOOD [[id number]] Collected: 01/02/21 0848    Order Status: Completed Specimen: Blood Updated: 01/07/21 1045     Special Requests: --        LEFT  Antecubital       Culture result: NO GROWTH 5 DAYS       CULTURE, BLOOD [[id number]] Collected: 12/31/20 0309    Order Status: Completed Specimen: Blood Updated: 01/05/21 1038     Special Requests: --        RIGHT  HAND       Culture result: NO GROWTH 5 DAYS       CULTURE, BLOOD [[id number]]  (Abnormal) Collected: 12/31/20 0549    Order Status: Completed Specimen: Blood Updated: 01/03/21 0736     Special Requests: --        NO SPECIAL REQUESTS  RIGHT  FOREARM       GRAM STAIN GRAM POSITIVE COCCI         AEROBIC BOTTLE POSITIVE               RESULTS VERIFIED, PHONED TO AND READ BACK BY GALLITO DSOUZA @ 0451 1/1/21 MM           Culture result:       ALPHA STREPTOCOCCUS, NOT S. PNEUMONIAE                  STAPHYLOCOCCUS SPECIES, COAGULASE NEGATIVE            REFER TO Richi Batista [id number]            THIS ORGANISM MAY BE INDICATIVE OF CULTURE CONTAMINATION, HOWEVER, CLINICAL CORRELATION NEEDS TO BE EVALUATED, AS EACH CASE IS UNIQUE. BLOOD CULTURE ID PANEL [[id number]]  (Abnormal) Collected: 12/31/20 0549    Order Status: Completed Specimen: Blood Updated: 01/01/21 0801     Acc. no. from Micro Order [id number]     Staphylococcus Detected        Comment: RESULTS VERIFIED, PHONED TO AND READ BACK BY  GALLITO DSOUZA @ 0451 1/1/21 MM          Streptococcus Detected        Comment: RESULTS VERIFIED, PHONED TO AND READ BACK BY  GALLITO DSOUZA @ 0451 1/1/21 MM          mecA (Methicillin-Resistance Genes) NOT DETECTED        INTERPRETATION       Multiple organisms detected. Results do not replace susceptibility testing. Assessment/Plan:       Acute respiratory failure with hypoxia secondary to COVID-19 infection:  - On airvo 60 L   - High risk for deterioration requiring BiPAP or intubation  - Continue Decadron  - Refused Remdesivir and convalescent plasma  - Continue home aerosols  - Continue Ceftriaxone + Doxy  - Wean oxygen as appropriate      Monomorphic ventricular tachycardia:  - Patient found to have monomorphic VT with EMS, converted on his own  - Given Mag in ER  - Cardiology saw him in ER and recommended correcting electrolytes  - Start Amio if it happens again  - Cardiology signed off, didn't write a note?       Bacteremia due to gram positive bacteria:  - Initial blood cultures with strep + staph but both common contaminants  - Continue Ceftriaxone + Doxy since improving  - Repeat cultures NGTD   - If persistent, will need ECHO      AGUSTIN:  - Resolved      Elevated troponin:  - Troponin 72 --> 425 --> 391  - No acute CP  - ?COVID related vs VT  - Repeat troponin until trending down  - Continue ASA + Plavix  - Cardiology assess in ER(?) -->  Previous attending has spoken to them and they think it's COVID      DM (diabetes mellitus) type II:  - A1C on 12/16/20 was 6.8  - Blood sugars in 300s, patient is on steroids   - Increase Lantus to 35 units   - continue Humalog SSI      Coronary artery disease:  - Stable  - Continue ASA + Plavix  - Continue Crestor      COPD:  - No acute wheezing  - Continue home aerosols      PAD:  - Continue ASA + Plavix      Hyperlipidemia:   - Continue Crestor    DIET DIABETIC CONSISTENT CARB Regular  DIET NUTRITIONAL SUPPLEMENTS All Meals; Luzmaria Johnston ( )    DVT Prophylaxis: Heparin    Discharge Plan: TBD      Signed By: Denilson Jimenez MD     January 8, 2021

## 2021-01-08 NOTE — PROGRESS NOTES
Covid precautions in place   did not visit with patient    Spoke with staff     Reviewed notes for spiritual concerns    [address] POWER AS A   CHRONIC ASBESTOS EXPOSURE  CHRONIC OBESITY  COPD          Prayer offered for patient's healing and protection      STAFF IS PROVIDING VERY COMPASSIONATE CARE

## 2021-01-08 NOTE — DIABETES MGMT
Patient's blood glucose ranged 201-331 yesterday with patient receiving Lantus 45 units, Humalog 67 units, and dexamethasone 6 mg. Blood glucose 94 this morning. Spoke with provider and order received to reduce Lantus to 35 units. New regimen: Lantus 35 units daily, Humalog 15 units, Humalog SSI, and dexamethasone 6 mg. Hg 10.6.

## 2021-01-08 NOTE — PROGRESS NOTES
Pt reviewed by [facility] to determine if appropriate for services. Arlene Jordan with [facility] let this CM know pt is appropriate and she can start authorization process. Update relayed to attending physician this day. No additional CM needs at this time. Will continue to follow and update as needed.

## 2021-01-08 NOTE — PROGRESS NOTES
Pt placed on [facility] CPAP - resting comfortably      01/07/21 2305   Oxygen Therapy   O2 Sat (%) 96 %   Pulse via Oximetry 80 beats per minute   O2 Device CPAP mask   FIO2 (%) 100 %   Respiratory   Respiratory (WDL) X   Breath Sounds Bilateral Scattered wheezing   CPAP/BIPAP   CPAP/BIPAP Start/Stop On   Device Mode CPAP   $$ CPAP Daily Yes   Mask Type and Size Full face; Large   Skin Condition intact   PIP Observed 10 cm H20   EPAP (cm H2O) 10 cm H2O   Vt Spont (ml) 484 ml   Ve Observed (l/min) 16.1 l/min   Total RR (Spontaneous) 21 breaths per minute   Leak (Estimated) 0 L/min   Pt's Home Machine No   Biomedical Check Performed Yes   Settings Verified Yes   Alarm Settings   High Pressure 40   Low Pressure 2   Apnea 20   Low Ve 3   High Rate 40   Low Rate 8   Pulmonary Toilet   Pulmonary Toilet H. O.B elevated

## 2021-01-09 LAB
ALBUMIN SERPL-MCNC: 2 G/DL (ref 3.2–4.6)
ALBUMIN SERPL-MCNC: 2.5 G/DL (ref 3.2–4.6)
ALBUMIN/GLOB SERPL: 0.6 {RATIO} (ref 1.2–3.5)
ALBUMIN/GLOB SERPL: 0.6 {RATIO} (ref 1.2–3.5)
ALP SERPL-CCNC: 75 U/L (ref 50–136)
ALP SERPL-CCNC: 76 U/L (ref 50–136)
ALT SERPL-CCNC: 19 U/L (ref 12–65)
ALT SERPL-CCNC: 68 U/L (ref 12–65)
ANION GAP SERPL CALC-SCNC: 4 MMOL/L (ref 7–16)
ANION GAP SERPL CALC-SCNC: 6 MMOL/L (ref 7–16)
AST SERPL-CCNC: 14 U/L (ref 15–37)
AST SERPL-CCNC: 46 U/L (ref 15–37)
BASOPHILS # BLD: 0 K/UL (ref 0–0.2)
BASOPHILS NFR BLD: 0 % (ref 0–2)
BILIRUB SERPL-MCNC: 0.5 MG/DL (ref 0.2–1.1)
BILIRUB SERPL-MCNC: 0.6 MG/DL (ref 0.2–1.1)
BUN SERPL-MCNC: 29 MG/DL (ref 8–23)
BUN SERPL-MCNC: 89 MG/DL (ref 8–23)
CALCIUM SERPL-MCNC: 7.7 MG/DL (ref 8.3–10.4)
CALCIUM SERPL-MCNC: 9.1 MG/DL (ref 8.3–10.4)
CHLORIDE SERPL-SCNC: 100 MMOL/L (ref 98–107)
CHLORIDE SERPL-SCNC: 96 MMOL/L (ref 98–107)
CO2 SERPL-SCNC: 28 MMOL/L (ref 21–32)
CO2 SERPL-SCNC: 30 MMOL/L (ref 21–32)
CREAT SERPL-MCNC: 0.93 MG/DL (ref 0.8–1.5)
CREAT SERPL-MCNC: 5.69 MG/DL (ref 0.8–1.5)
DIFFERENTIAL METHOD BLD: ABNORMAL
EOSINOPHIL # BLD: 0 K/UL (ref 0–0.8)
EOSINOPHIL NFR BLD: 0 % (ref 0.5–7.8)
ERYTHROCYTE [DISTWIDTH] IN BLOOD BY AUTOMATED COUNT: 15.9 % (ref 11.9–14.6)
GLOBULIN SER CALC-MCNC: 3.3 G/DL (ref 2.3–3.5)
GLOBULIN SER CALC-MCNC: 4 G/DL (ref 2.3–3.5)
GLUCOSE BLD STRIP.AUTO-MCNC: 120 MG/DL (ref 65–100)
GLUCOSE BLD STRIP.AUTO-MCNC: 170 MG/DL (ref 65–100)
GLUCOSE BLD STRIP.AUTO-MCNC: 219 MG/DL (ref 65–100)
GLUCOSE BLD STRIP.AUTO-MCNC: 241 MG/DL (ref 65–100)
GLUCOSE SERPL-MCNC: 225 MG/DL (ref 65–100)
GLUCOSE SERPL-MCNC: 237 MG/DL (ref 65–100)
HCT VFR BLD AUTO: 26.9 % (ref 41.1–50.3)
HGB BLD-MCNC: 9.1 G/DL (ref 13.6–17.2)
IMM GRANULOCYTES # BLD AUTO: 0.1 K/UL (ref 0–0.5)
IMM GRANULOCYTES NFR BLD AUTO: 1 % (ref 0–5)
LYMPHOCYTES # BLD: 0.7 K/UL (ref 0.5–4.6)
LYMPHOCYTES NFR BLD: 5 % (ref 13–44)
MCH RBC QN AUTO: 31.6 PG (ref 26.1–32.9)
MCHC RBC AUTO-ENTMCNC: 33.8 G/DL (ref 31.4–35)
MCV RBC AUTO: 93.4 FL (ref 79.6–97.8)
MONOCYTES # BLD: 0.7 K/UL (ref 0.1–1.3)
MONOCYTES NFR BLD: 5 % (ref 4–12)
NEUTS SEG # BLD: 13 K/UL (ref 1.7–8.2)
NEUTS SEG NFR BLD: 90 % (ref 43–78)
NRBC # BLD: 0 K/UL (ref 0–0.2)
PLATELET # BLD AUTO: 99 K/UL (ref 150–450)
PMV BLD AUTO: 10.7 FL (ref 9.4–12.3)
POTASSIUM SERPL-SCNC: 5.1 MMOL/L (ref 3.5–5.1)
POTASSIUM SERPL-SCNC: 5.6 MMOL/L (ref 3.5–5.1)
PROT SERPL-MCNC: 5.3 G/DL (ref 6.3–8.2)
PROT SERPL-MCNC: 6.5 G/DL (ref 6.3–8.2)
RBC # BLD AUTO: 2.88 M/UL (ref 4.23–5.6)
SODIUM SERPL-SCNC: 130 MMOL/L (ref 136–145)
SODIUM SERPL-SCNC: 134 MMOL/L (ref 136–145)
WBC # BLD AUTO: 14.5 K/UL (ref 4.3–11.1)

## 2021-01-09 PROCEDURE — 74011250637 HC RX REV CODE- 250/637: Performed by: INTERNAL MEDICINE

## 2021-01-09 PROCEDURE — 77010033711 HC HIGH FLOW OXYGEN

## 2021-01-09 PROCEDURE — 77010033678 HC OXYGEN DAILY

## 2021-01-09 PROCEDURE — 80053 COMPREHEN METABOLIC PANEL: CPT

## 2021-01-09 PROCEDURE — 74011636637 HC RX REV CODE- 636/637: Performed by: FAMILY MEDICINE

## 2021-01-09 PROCEDURE — 74011250636 HC RX REV CODE- 250/636: Performed by: INTERNAL MEDICINE

## 2021-01-09 PROCEDURE — 2709999900 HC NON-CHARGEABLE SUPPLY

## 2021-01-09 PROCEDURE — 65270000029 HC RM PRIVATE

## 2021-01-09 PROCEDURE — 94762 N-INVAS EAR/PLS OXIMTRY CONT: CPT

## 2021-01-09 PROCEDURE — 94640 AIRWAY INHALATION TREATMENT: CPT

## 2021-01-09 PROCEDURE — 74011636637 HC RX REV CODE- 636/637: Performed by: INTERNAL MEDICINE

## 2021-01-09 PROCEDURE — 85025 COMPLETE CBC W/AUTO DIFF WBC: CPT

## 2021-01-09 PROCEDURE — 36415 COLL VENOUS BLD VENIPUNCTURE: CPT

## 2021-01-09 PROCEDURE — 82962 GLUCOSE BLOOD TEST: CPT

## 2021-01-09 RX ADMIN — DEXAMETHASONE 6 MG: 4 TABLET ORAL at 08:49

## 2021-01-09 RX ADMIN — Medication 10 ML: at 14:30

## 2021-01-09 RX ADMIN — INSULIN LISPRO 4 UNITS: 100 INJECTION, SOLUTION INTRAVENOUS; SUBCUTANEOUS at 21:38

## 2021-01-09 RX ADMIN — ROSUVASTATIN 10 MG: 10 TABLET, FILM COATED ORAL at 21:37

## 2021-01-09 RX ADMIN — INSULIN LISPRO 4 UNITS: 100 INJECTION, SOLUTION INTRAVENOUS; SUBCUTANEOUS at 17:11

## 2021-01-09 RX ADMIN — BUDESONIDE AND FORMOTEROL FUMARATE DIHYDRATE 2 PUFF: 160; 4.5 AEROSOL RESPIRATORY (INHALATION) at 18:33

## 2021-01-09 RX ADMIN — INSULIN GLARGINE 35 UNITS: 100 INJECTION, SOLUTION SUBCUTANEOUS at 09:17

## 2021-01-09 RX ADMIN — ALBUTEROL SULFATE 2 PUFF: 108 INHALANT RESPIRATORY (INHALATION) at 16:00

## 2021-01-09 RX ADMIN — LORATADINE 10 MG: 10 TABLET ORAL at 08:48

## 2021-01-09 RX ADMIN — HEPARIN SODIUM 5000 UNITS: 5000 INJECTION INTRAVENOUS; SUBCUTANEOUS at 08:47

## 2021-01-09 RX ADMIN — HEPARIN SODIUM 5000 UNITS: 5000 INJECTION INTRAVENOUS; SUBCUTANEOUS at 16:15

## 2021-01-09 RX ADMIN — Medication 10 ML: at 06:01

## 2021-01-09 RX ADMIN — ASPIRIN 81 MG: 81 TABLET, COATED ORAL at 21:37

## 2021-01-09 RX ADMIN — CLOPIDOGREL BISULFATE 75 MG: 75 TABLET ORAL at 08:48

## 2021-01-09 RX ADMIN — MAGNESIUM GLUCONATE 500 MG ORAL TABLET 400 MG: 500 TABLET ORAL at 08:48

## 2021-01-09 RX ADMIN — Medication 10 ML: at 21:38

## 2021-01-09 RX ADMIN — ALBUTEROL SULFATE 2 PUFF: 108 INHALANT RESPIRATORY (INHALATION) at 18:32

## 2021-01-09 RX ADMIN — INSULIN LISPRO 2 UNITS: 100 INJECTION, SOLUTION INTRAVENOUS; SUBCUTANEOUS at 12:18

## 2021-01-09 RX ADMIN — MONTELUKAST SODIUM 10 MG: 10 TABLET, FILM COATED ORAL at 21:37

## 2021-01-09 RX ADMIN — BUDESONIDE AND FORMOTEROL FUMARATE DIHYDRATE 2 PUFF: 160; 4.5 AEROSOL RESPIRATORY (INHALATION) at 10:24

## 2021-01-09 RX ADMIN — INSULIN LISPRO 15 UNITS: 100 INJECTION, SOLUTION INTRAVENOUS; SUBCUTANEOUS at 17:10

## 2021-01-09 RX ADMIN — INSULIN LISPRO 15 UNITS: 100 INJECTION, SOLUTION INTRAVENOUS; SUBCUTANEOUS at 12:17

## 2021-01-09 RX ADMIN — ALBUTEROL SULFATE 2 PUFF: 108 INHALANT RESPIRATORY (INHALATION) at 09:00

## 2021-01-09 NOTE — PROGRESS NOTES
Pt placed on airvo 60/100 with NRB, sats 91% at rest. Reviewed lung exercises with patient. Encouraged PO intake.

## 2021-01-09 NOTE — PROGRESS NOTES
Pt placed on [facility] CPAP to help increased WOB - now resting comfortably      01/08/21 2049   Oxygen Therapy   O2 Sat (%) 96 %   Pulse via Oximetry 81 beats per minute   O2 Device CPAP mask   FIO2 (%) 80 %   Respiratory   Respiratory (WDL) X   Breath Sounds Bilateral Diminished   CPAP/BIPAP   CPAP/BIPAP Start/Stop On   Device Mode CPAP   $$ CPAP Daily Yes   Mask Type and Size Full face; Large   Skin Condition intact   PIP Observed 10 cm H20   EPAP (cm H2O) 80 cm H2O   Vt Spont (ml) 878 ml   Total RR (Spontaneous) 16 breaths per minute   Leak (Estimated) 13 L/min   Pt's Home Machine No   Biomedical Check Performed Yes   Settings Verified Yes   Alarm Settings   High Pressure 30   Low Pressure 5   Apnea 20   Low Ve 2   High Rate 50   Low Rate 8   Pulmonary Toilet   Pulmonary Toilet H. O.B elevated

## 2021-01-09 NOTE — PROGRESS NOTES
Hospitalist Progress Note    Patient: Luz Elena Strange MRN: [id number]  SSN: [id number]    YOB: 1945  Age: 67 y.o. Sex: male      Admit Date: 12/31/2020    LOS: 9 days     Subjective:   68 yo CM with past history of CAD s/p stents, ischemic cardiomyopathy, PAD (on Plavix and cilostazo), COPD, DM type II, HTN, HLD presents via EMS after fall at home and was admitted due to acute respiratory failure due to COVID and had an episode of VTach. 1/9: Patient is on 60 L Airvo and BiPAP at night. No change in the condition. I spoke to the wife. Updated her on patient's current condition. Family is aware of gravity of situation. Patient may deteriorate requiring BiPAP or intubation. Patient is full code. Review of systems negative except stated above. Objective:     Visit Vitals  /74 (BP 1 Location: Right arm, BP Patient Position: At rest)   Pulse 76   Temp 98.1 °F (36.7 °C)   Resp 22   Ht 6' 1\" (1.854 m)   Wt 131.1 kg (289 lb)   SpO2 95%   BMI 38.13 kg/m²      Oxygen Therapy  O2 Sat (%): 95 % (01/09/21 1319)  Pulse via Oximetry: 75 beats per minute (01/09/21 1319)  O2 Device: Heated; Hi flow nasal cannula (01/09/21 1319)  O2 Flow Rate (L/min): 60 l/min (01/09/21 1319)  O2 Temperature: 88 °F (31.1 °C) (01/09/21 1319)  FIO2 (%): 100 % (01/09/21 1319)      Intake and Output:     Intake/Output Summary (Last 24 hours) at 1/9/2021 1453  Last data filed at 1/9/2021 1304  Gross per 24 hour   Intake 980 ml   Output 950 ml   Net 30 ml         Physical Exam:   GENERAL: alert, cooperative, moderate resp distress, appears stated age  EYE: conjunctivae/corneas clear. PERRL. THROAT & NECK: normal and no erythema or exudates noted. LUNG: clear to auscultation bilaterally  HEART: regular rate and rhythm, S1S2, no murmur, no JVD  ABDOMEN: soft, non-tender, non-distended.  Bowel sounds normal.   EXTREMITIES:  No edema, 2+ pedal/radial pulses bilaterally  SKIN: no rash or abnormalities  NEUROLOGIC: A&Ox3. Cranial nerves 2-12 grossly intact. Lab/Data Review:  Recent Results (from the past 24 hour(s))   GLUCOSE, POC    Collection Time: 01/08/21  5:19 PM   Result Value Ref Range    Glucose (POC) 260 (H) 65 - 100 mg/dL   GLUCOSE, POC    Collection Time: 01/08/21  8:52 PM   Result Value Ref Range    Glucose (POC) 258 (H) 65 - 248 mg/dL   METABOLIC PANEL, COMPREHENSIVE    Collection Time: 01/09/21  5:20 AM   Result Value Ref Range    Sodium 130 (L) 136 - 145 mmol/L    Potassium 5.1 3.5 - 5.1 mmol/L    Chloride 96 (L) 98 - 107 mmol/L    CO2 28 21 - 32 mmol/L    Anion gap 6 (L) 7 - 16 mmol/L    Glucose 225 (H) 65 - 100 mg/dL    BUN 89 (H) 8 - 23 MG/DL    Creatinine 5.69 (H) 0.8 - 1.5 MG/DL    GFR est AA 13 (L) >60 ml/min/1.73m2    GFR est non-AA 10 (L) >60 ml/min/1.73m2    Calcium 7.7 (L) 8.3 - 10.4 MG/DL    Bilirubin, total 0.5 0.2 - 1.1 MG/DL    ALT (SGPT) 19 12 - 65 U/L    AST (SGOT) 14 (L) 15 - 37 U/L    Alk. phosphatase 76 50 - 136 U/L    Protein, total 5.3 (L) 6.3 - 8.2 g/dL    Albumin 2.0 (L) 3.2 - 4.6 g/dL    Globulin 3.3 2.3 - 3.5 g/dL    A-G Ratio 0.6 (L) 1.2 - 3.5     CBC WITH AUTOMATED DIFF    Collection Time: 01/09/21  5:20 AM   Result Value Ref Range    WBC 14.5 (H) 4.3 - 11.1 K/uL    RBC 2.88 (L) 4.23 - 5.6 M/uL    HGB 9.1 (L) 13.6 - 17.2 g/dL    HCT 26.9 (L) 41.1 - 50.3 %    MCV 93.4 79.6 - 97.8 FL    MCH 31.6 26.1 - 32.9 PG    MCHC 33.8 31.4 - 35.0 g/dL    RDW 15.9 (H) 11.9 - 14.6 %    PLATELET 99 (L) 317 - 450 K/uL    MPV 10.7 9.4 - 12.3 FL    ABSOLUTE NRBC 0.00 0.0 - 0.2 K/uL    DF AUTOMATED      NEUTROPHILS 90 (H) 43 - 78 %    LYMPHOCYTES 5 (L) 13 - 44 %    MONOCYTES 5 4.0 - 12.0 %    EOSINOPHILS 0 (L) 0.5 - 7.8 %    BASOPHILS 0 0.0 - 2.0 %    IMMATURE GRANULOCYTES 1 0.0 - 5.0 %    ABS. NEUTROPHILS 13.0 (H) 1.7 - 8.2 K/UL    ABS. LYMPHOCYTES 0.7 0.5 - 4.6 K/UL    ABS. MONOCYTES 0.7 0.1 - 1.3 K/UL    ABS. EOSINOPHILS 0.0 0.0 - 0.8 K/UL    ABS. BASOPHILS 0.0 0.0 - 0.2 K/UL    ABS. IMM.  GRANS. 0.1 0.0 - 0.5 K/UL   GLUCOSE, POC    Collection Time: 01/09/21  7:45 AM   Result Value Ref Range    Glucose (POC) 120 (H) 65 - 100 mg/dL   GLUCOSE, POC    Collection Time: 01/09/21 11:57 AM   Result Value Ref Range    Glucose (POC) 170 (H) 65 - 100 mg/dL       SARS-CoV-2 Lab Results  \"Novel Coronavirus\" Test: No results found for: COV2NT   \"Emergent Disease\" Test: No results found for: EDPR  \"SARS-COV-2\" Test: No results found for: XGCOVT  \"Precision Labs\" Test: No results found for: RSLT  Rapid Test:   Lab Results   Component Value Date/Time    COVR Detected (AA) 12/31/2020 05:49 AM           Imaging:  Xr Chest Port    Result Date: 12/31/2020  EXAM: Chest x-ray. INDICATION: Dyspnea. COMPARISON: Prior chest x-ray on April 9, 2019. TECHNIQUE: 2 frontal views of the chest were obtained. FINDINGS: The lungs are hyperexpanded, consistent with the reported history of COPD. There is new mild infiltrate in the right lung base. The left lung is clear. The heart is borderline enlarged. The mediastinal contour and pulmonary vasculature are normal. No pneumothorax or pleural effusion is seen. There are degenerative changes in the spine. IMPRESSION: 1. Mild right lung base infiltrate, suspect for acute pneumonia. 2. COPD. No results found for this visit on 12/31/20. Cultures:   All Micro Results     Procedure Component Value Units Date/Time    CULTURE, BLOOD [[id number]] Collected: 01/02/21 0843    Order Status: Completed Specimen: Blood Updated: 01/07/21 1045     Special Requests: --        RIGHT  Antecubital       Culture result: NO GROWTH 5 DAYS       CULTURE, BLOOD [[id number]] Collected: 01/02/21 0848    Order Status: Completed Specimen: Blood Updated: 01/07/21 1045     Special Requests: --        LEFT  Antecubital       Culture result: NO GROWTH 5 DAYS       CULTURE, BLOOD [[id number]] Collected: 12/31/20 0309    Order Status: Completed Specimen: Blood Updated: 01/05/21 1038     Special Requests: --        RIGHT  HAND Culture result: NO GROWTH 5 DAYS       CULTURE, BLOOD [[id number]]  (Abnormal) Collected: 12/31/20 0549    Order Status: Completed Specimen: Blood Updated: 01/03/21 0736     Special Requests: --        NO SPECIAL REQUESTS  RIGHT  FOREARM       GRAM STAIN GRAM POSITIVE COCCI         AEROBIC BOTTLE POSITIVE               RESULTS VERIFIED, PHONED TO AND READ BACK BY GALLITO DSOUZA @ 0451 1/1/21 MM           Culture result:       ALPHA STREPTOCOCCUS, NOT S. PNEUMONIAE                  STAPHYLOCOCCUS SPECIES, COAGULASE NEGATIVE            REFER TO Mariluz Colbert [id number]            THIS ORGANISM MAY BE INDICATIVE OF CULTURE CONTAMINATION, HOWEVER, CLINICAL CORRELATION NEEDS TO BE EVALUATED, AS EACH CASE IS UNIQUE. BLOOD CULTURE ID PANEL [[id number]]  (Abnormal) Collected: 12/31/20 0549    Order Status: Completed Specimen: Blood Updated: 01/01/21 0801     Acc. no. from Micro Order [id number]     Staphylococcus Detected        Comment: RESULTS VERIFIED, PHONED TO AND READ BACK BY  GALLITO DSOUZA @ 0451 1/1/21 MM          Streptococcus Detected        Comment: RESULTS VERIFIED, PHONED TO AND READ BACK BY  GALLITO DSOUZA @ 0451 1/1/21 MM          mecA (Methicillin-Resistance Genes) NOT DETECTED        INTERPRETATION       Multiple organisms detected. Results do not replace susceptibility testing. Assessment/Plan:       Acute respiratory failure with hypoxia secondary to COVID-19 infection:  - On airvo 60 L   - High risk for deterioration requiring BiPAP or intubation  - Continue Decadron  - Refused Remdesivir and convalescent plasma  - Continue home aerosols  - Continue Ceftriaxone + Doxy  - Wean oxygen as appropriate      Monomorphic ventricular tachycardia:  - Patient found to have monomorphic VT with EMS, converted on his own  - Given Mag in ER  - Cardiology saw him in ER and recommended correcting electrolytes  - Start Amio if it happens again  - Cardiology signed off, didn't write a note?       Bacteremia due to gram positive bacteria:  - Initial blood cultures with strep + staph but both common contaminants  - Continue Ceftriaxone + Doxy since improving  - Repeat cultures NGTD   - If persistent, will need ECHO      AGUSTIN:  -Creatinine increased from 0.95-5.69 today  - I will repeat the BMP  - If continues to be high will give some fluid, need to be very careful for the fluid resuscitation due to patient respiratory status  - renal US      Elevated troponin:  - Troponin 72 --> 425 --> 391  - No acute CP  - ?COVID related vs VT  - Repeat troponin until trending down  - Continue ASA + Plavix  - Cardiology assess in ER(?) -->  Previous attending has spoken to them and they think it's COVID      DM (diabetes mellitus) type II:  - A1C on 12/16/20 was 6.8  - Blood sugars in 300s, patient is on steroids   - Increase Lantus to 35 units   - continue Humalog SSI      Coronary artery disease:  - Stable  - Continue ASA + Plavix  - Continue Crestor      COPD:  - No acute wheezing  - Continue home aerosols      PAD:  - Continue ASA + Plavix      Hyperlipidemia:   - Continue Crestor    DIET DIABETIC CONSISTENT CARB Regular  DIET NUTRITIONAL SUPPLEMENTS All Meals; Luzmaria Johnston ( )    DVT Prophylaxis: Heparin    Discharge Plan: TBD      Signed By: Latha Chatterjee MD     January 9, 2021

## 2021-01-10 ENCOUNTER — APPOINTMENT (OUTPATIENT)
Dept: ULTRASOUND IMAGING | Age: 76
DRG: 177 | End: 2021-01-10
Attending: FAMILY MEDICINE
Payer: MEDICARE

## 2021-01-10 LAB
ALBUMIN SERPL-MCNC: 2.5 G/DL (ref 3.2–4.6)
ALBUMIN/GLOB SERPL: 0.6 {RATIO} (ref 1.2–3.5)
ALP SERPL-CCNC: 74 U/L (ref 50–136)
ALT SERPL-CCNC: 63 U/L (ref 12–65)
ANION GAP SERPL CALC-SCNC: 2 MMOL/L (ref 7–16)
AST SERPL-CCNC: 40 U/L (ref 15–37)
BASOPHILS # BLD: 0 K/UL (ref 0–0.2)
BASOPHILS NFR BLD: 0 % (ref 0–2)
BILIRUB SERPL-MCNC: 0.6 MG/DL (ref 0.2–1.1)
BUN SERPL-MCNC: 23 MG/DL (ref 8–23)
CALCIUM SERPL-MCNC: 8.6 MG/DL (ref 8.3–10.4)
CHLORIDE SERPL-SCNC: 100 MMOL/L (ref 98–107)
CO2 SERPL-SCNC: 33 MMOL/L (ref 21–32)
CREAT SERPL-MCNC: 0.84 MG/DL (ref 0.8–1.5)
DIFFERENTIAL METHOD BLD: ABNORMAL
EOSINOPHIL # BLD: 0.1 K/UL (ref 0–0.8)
EOSINOPHIL NFR BLD: 1 % (ref 0.5–7.8)
ERYTHROCYTE [DISTWIDTH] IN BLOOD BY AUTOMATED COUNT: 14.3 % (ref 11.9–14.6)
GLOBULIN SER CALC-MCNC: 4.1 G/DL (ref 2.3–3.5)
GLUCOSE BLD STRIP.AUTO-MCNC: 114 MG/DL (ref 65–100)
GLUCOSE BLD STRIP.AUTO-MCNC: 122 MG/DL (ref 65–100)
GLUCOSE BLD STRIP.AUTO-MCNC: 136 MG/DL (ref 65–100)
GLUCOSE BLD STRIP.AUTO-MCNC: 84 MG/DL (ref 65–100)
GLUCOSE SERPL-MCNC: 113 MG/DL (ref 65–100)
HCT VFR BLD AUTO: 32.6 % (ref 41.1–50.3)
HGB BLD-MCNC: 11.2 G/DL (ref 13.6–17.2)
IMM GRANULOCYTES # BLD AUTO: 0.1 K/UL (ref 0–0.5)
IMM GRANULOCYTES NFR BLD AUTO: 1 % (ref 0–5)
LYMPHOCYTES # BLD: 0.6 K/UL (ref 0.5–4.6)
LYMPHOCYTES NFR BLD: 10 % (ref 13–44)
MCH RBC QN AUTO: 33 PG (ref 26.1–32.9)
MCHC RBC AUTO-ENTMCNC: 34.4 G/DL (ref 31.4–35)
MCV RBC AUTO: 96.2 FL (ref 79.6–97.8)
MONOCYTES # BLD: 0.7 K/UL (ref 0.1–1.3)
MONOCYTES NFR BLD: 10 % (ref 4–12)
NEUTS SEG # BLD: 5.1 K/UL (ref 1.7–8.2)
NEUTS SEG NFR BLD: 77 % (ref 43–78)
NRBC # BLD: 0 K/UL (ref 0–0.2)
PLATELET # BLD AUTO: 184 K/UL (ref 150–450)
PMV BLD AUTO: 10.7 FL (ref 9.4–12.3)
POTASSIUM SERPL-SCNC: 4.9 MMOL/L (ref 3.5–5.1)
PROT SERPL-MCNC: 6.6 G/DL (ref 6.3–8.2)
RBC # BLD AUTO: 3.39 M/UL (ref 4.23–5.6)
SODIUM SERPL-SCNC: 135 MMOL/L (ref 136–145)
WBC # BLD AUTO: 6.6 K/UL (ref 4.3–11.1)

## 2021-01-10 PROCEDURE — 94762 N-INVAS EAR/PLS OXIMTRY CONT: CPT

## 2021-01-10 PROCEDURE — 74011250637 HC RX REV CODE- 250/637: Performed by: INTERNAL MEDICINE

## 2021-01-10 PROCEDURE — 94640 AIRWAY INHALATION TREATMENT: CPT

## 2021-01-10 PROCEDURE — 65270000029 HC RM PRIVATE

## 2021-01-10 PROCEDURE — 77010033711 HC HIGH FLOW OXYGEN

## 2021-01-10 PROCEDURE — 94660 CPAP INITIATION&MGMT: CPT

## 2021-01-10 PROCEDURE — 97110 THERAPEUTIC EXERCISES: CPT

## 2021-01-10 PROCEDURE — 76775 US EXAM ABDO BACK WALL LIM: CPT

## 2021-01-10 PROCEDURE — 76770 US EXAM ABDO BACK WALL COMP: CPT

## 2021-01-10 PROCEDURE — 94760 N-INVAS EAR/PLS OXIMETRY 1: CPT

## 2021-01-10 PROCEDURE — 82962 GLUCOSE BLOOD TEST: CPT

## 2021-01-10 PROCEDURE — 74011250636 HC RX REV CODE- 250/636: Performed by: INTERNAL MEDICINE

## 2021-01-10 PROCEDURE — 80053 COMPREHEN METABOLIC PANEL: CPT

## 2021-01-10 PROCEDURE — 74011636637 HC RX REV CODE- 636/637: Performed by: FAMILY MEDICINE

## 2021-01-10 PROCEDURE — 97530 THERAPEUTIC ACTIVITIES: CPT

## 2021-01-10 PROCEDURE — 36415 COLL VENOUS BLD VENIPUNCTURE: CPT

## 2021-01-10 PROCEDURE — 85025 COMPLETE CBC W/AUTO DIFF WBC: CPT

## 2021-01-10 RX ADMIN — ASPIRIN 81 MG: 81 TABLET, COATED ORAL at 20:31

## 2021-01-10 RX ADMIN — INSULIN LISPRO 15 UNITS: 100 INJECTION, SOLUTION INTRAVENOUS; SUBCUTANEOUS at 08:19

## 2021-01-10 RX ADMIN — Medication 10 ML: at 13:10

## 2021-01-10 RX ADMIN — BUDESONIDE AND FORMOTEROL FUMARATE DIHYDRATE 2 PUFF: 160; 4.5 AEROSOL RESPIRATORY (INHALATION) at 09:14

## 2021-01-10 RX ADMIN — Medication 10 ML: at 23:02

## 2021-01-10 RX ADMIN — INSULIN GLARGINE 35 UNITS: 100 INJECTION, SOLUTION SUBCUTANEOUS at 08:20

## 2021-01-10 RX ADMIN — LORATADINE 10 MG: 10 TABLET ORAL at 08:20

## 2021-01-10 RX ADMIN — ROSUVASTATIN 10 MG: 10 TABLET, FILM COATED ORAL at 23:01

## 2021-01-10 RX ADMIN — MAGNESIUM GLUCONATE 500 MG ORAL TABLET 400 MG: 500 TABLET ORAL at 08:21

## 2021-01-10 RX ADMIN — ALBUTEROL SULFATE 2 PUFF: 108 INHALANT RESPIRATORY (INHALATION) at 20:24

## 2021-01-10 RX ADMIN — BUDESONIDE AND FORMOTEROL FUMARATE DIHYDRATE 2 PUFF: 160; 4.5 AEROSOL RESPIRATORY (INHALATION) at 20:24

## 2021-01-10 RX ADMIN — ALBUTEROL SULFATE 2 PUFF: 108 INHALANT RESPIRATORY (INHALATION) at 09:14

## 2021-01-10 RX ADMIN — INSULIN LISPRO 15 UNITS: 100 INJECTION, SOLUTION INTRAVENOUS; SUBCUTANEOUS at 12:13

## 2021-01-10 RX ADMIN — HEPARIN SODIUM 5000 UNITS: 5000 INJECTION INTRAVENOUS; SUBCUTANEOUS at 16:46

## 2021-01-10 RX ADMIN — HEPARIN SODIUM 5000 UNITS: 5000 INJECTION INTRAVENOUS; SUBCUTANEOUS at 23:01

## 2021-01-10 RX ADMIN — HEPARIN SODIUM 5000 UNITS: 5000 INJECTION INTRAVENOUS; SUBCUTANEOUS at 00:05

## 2021-01-10 RX ADMIN — MONTELUKAST SODIUM 10 MG: 10 TABLET, FILM COATED ORAL at 23:01

## 2021-01-10 RX ADMIN — ALBUTEROL SULFATE 2 PUFF: 108 INHALANT RESPIRATORY (INHALATION) at 13:35

## 2021-01-10 RX ADMIN — HEPARIN SODIUM 5000 UNITS: 5000 INJECTION INTRAVENOUS; SUBCUTANEOUS at 08:20

## 2021-01-10 RX ADMIN — Medication 10 ML: at 05:08

## 2021-01-10 RX ADMIN — CLOPIDOGREL BISULFATE 75 MG: 75 TABLET ORAL at 08:20

## 2021-01-10 NOTE — PROGRESS NOTES
Hospitalist Progress Note    Patient: Gia Benavides MRN: [id number]  SSN: [id number]    YOB: 1945  Age: 67 y.o. Sex: male      Admit Date: 12/31/2020    LOS: 10 days     Subjective:   66 yo CM with past history of CAD s/p stents, ischemic cardiomyopathy, PAD (on Plavix and cilostazo), COPD, DM type II, HTN, HLD presents via EMS after fall at home and was admitted due to acute respiratory failure due to COVID and had an episode of VTach. Family has refused for remdesivir and convalescent plasma. 1/11: Patient is on 60 L Airvo/nonrebreather and BiPAP at night. No change in the condition. Patient looks irritable and tired and states he rather be on BiPAP than airvo/nonrebreather. I spoke to the wife. Updated her on patient's current condition. Family is aware of gravity of situation. Patient may deteriorate requiring BiPAP or intubation. Patient is full code. Review of systems negative except stated above. Objective:     Visit Vitals  /67 (BP 1 Location: Right arm, BP Patient Position: Sitting)   Pulse 79   Temp 97.4 °F (36.3 °C)   Resp 20   Ht 6' 1\" (1.854 m)   Wt 131.1 kg (289 lb)   SpO2 95%   BMI 38.13 kg/m²      Oxygen Therapy  O2 Sat (%): 95 % (01/10/21 1201)  Pulse via Oximetry: 70 beats per minute (01/09/21 1954)  O2 Device: BIPAP (01/10/21 0836)  O2 Flow Rate (L/min): 60 l/min (01/09/21 1954)  O2 Temperature: 87.8 °F (31 °C) (01/09/21 1954)  FIO2 (%): 100 % (01/09/21 1954)      Intake and Output:     Intake/Output Summary (Last 24 hours) at 1/10/2021 1253  Last data filed at 1/10/2021 1217  Gross per 24 hour   Intake 500 ml   Output 750 ml   Net -250 ml         Physical Exam:   GENERAL: alert, cooperative, moderate resp distress, appears stated age  EYE: conjunctivae/corneas clear. PERRL. THROAT & NECK: normal and no erythema or exudates noted.    LUNG: clear to auscultation bilaterally  HEART: regular rate and rhythm, S1S2, no murmur, no JVD  ABDOMEN: soft, non-tender, non-distended. Bowel sounds normal.   EXTREMITIES:  No edema, 2+ pedal/radial pulses bilaterally  SKIN: no rash or abnormalities  NEUROLOGIC: A&Ox3. Cranial nerves 2-12 grossly intact. Lab/Data Review:  Recent Results (from the past 24 hour(s))   METABOLIC PANEL, COMPREHENSIVE    Collection Time: 01/09/21  2:50 PM   Result Value Ref Range    Sodium 134 (L) 136 - 145 mmol/L    Potassium 5.6 (H) 3.5 - 5.1 mmol/L    Chloride 100 98 - 107 mmol/L    CO2 30 21 - 32 mmol/L    Anion gap 4 (L) 7 - 16 mmol/L    Glucose 237 (H) 65 - 100 mg/dL    BUN 29 (H) 8 - 23 MG/DL    Creatinine 0.93 0.8 - 1.5 MG/DL    GFR est AA >60 >60 ml/min/1.73m2    GFR est non-AA >60 >60 ml/min/1.73m2    Calcium 9.1 8.3 - 10.4 MG/DL    Bilirubin, total 0.6 0.2 - 1.1 MG/DL    ALT (SGPT) 68 (H) 12 - 65 U/L    AST (SGOT) 46 (H) 15 - 37 U/L    Alk. phosphatase 75 50 - 136 U/L    Protein, total 6.5 6.3 - 8.2 g/dL    Albumin 2.5 (L) 3.2 - 4.6 g/dL    Globulin 4.0 (H) 2.3 - 3.5 g/dL    A-G Ratio 0.6 (L) 1.2 - 3.5     GLUCOSE, POC    Collection Time: 01/09/21  4:32 PM   Result Value Ref Range    Glucose (POC) 219 (H) 65 - 100 mg/dL   GLUCOSE, POC    Collection Time: 01/09/21  9:09 PM   Result Value Ref Range    Glucose (POC) 241 (H) 65 - 342 mg/dL   METABOLIC PANEL, COMPREHENSIVE    Collection Time: 01/10/21  7:19 AM   Result Value Ref Range    Sodium 135 (L) 136 - 145 mmol/L    Potassium 4.9 3.5 - 5.1 mmol/L    Chloride 100 98 - 107 mmol/L    CO2 33 (H) 21 - 32 mmol/L    Anion gap 2 (L) 7 - 16 mmol/L    Glucose 113 (H) 65 - 100 mg/dL    BUN 23 8 - 23 MG/DL    Creatinine 0.84 0.8 - 1.5 MG/DL    GFR est AA >60 >60 ml/min/1.73m2    GFR est non-AA >60 >60 ml/min/1.73m2    Calcium 8.6 8.3 - 10.4 MG/DL    Bilirubin, total 0.6 0.2 - 1.1 MG/DL    ALT (SGPT) 63 12 - 65 U/L    AST (SGOT) 40 (H) 15 - 37 U/L    Alk.  phosphatase 74 50 - 136 U/L    Protein, total 6.6 6.3 - 8.2 g/dL    Albumin 2.5 (L) 3.2 - 4.6 g/dL    Globulin 4.1 (H) 2.3 - 3.5 g/dL    A-G Ratio 0.6 (L) 1.2 - 3.5     CBC WITH AUTOMATED DIFF    Collection Time: 01/10/21  7:19 AM   Result Value Ref Range    WBC 6.6 4.3 - 11.1 K/uL    RBC 3.39 (L) 4.23 - 5.6 M/uL    HGB 11.2 (L) 13.6 - 17.2 g/dL    HCT 32.6 (L) 41.1 - 50.3 %    MCV 96.2 79.6 - 97.8 FL    MCH 33.0 (H) 26.1 - 32.9 PG    MCHC 34.4 31.4 - 35.0 g/dL    RDW 14.3 11.9 - 14.6 %    PLATELET 744 204 - 296 K/uL    MPV 10.7 9.4 - 12.3 FL    ABSOLUTE NRBC 0.00 0.0 - 0.2 K/uL    DF AUTOMATED      NEUTROPHILS 77 43 - 78 %    LYMPHOCYTES 10 (L) 13 - 44 %    MONOCYTES 10 4.0 - 12.0 %    EOSINOPHILS 1 0.5 - 7.8 %    BASOPHILS 0 0.0 - 2.0 %    IMMATURE GRANULOCYTES 1 0.0 - 5.0 %    ABS. NEUTROPHILS 5.1 1.7 - 8.2 K/UL    ABS. LYMPHOCYTES 0.6 0.5 - 4.6 K/UL    ABS. MONOCYTES 0.7 0.1 - 1.3 K/UL    ABS. EOSINOPHILS 0.1 0.0 - 0.8 K/UL    ABS. BASOPHILS 0.0 0.0 - 0.2 K/UL    ABS. IMM. GRANS. 0.1 0.0 - 0.5 K/UL   GLUCOSE, POC    Collection Time: 01/10/21  7:42 AM   Result Value Ref Range    Glucose (POC) 122 (H) 65 - 100 mg/dL   GLUCOSE, POC    Collection Time: 01/10/21 11:57 AM   Result Value Ref Range    Glucose (POC) 136 (H) 65 - 100 mg/dL       SARS-CoV-2 Lab Results  \"Novel Coronavirus\" Test: No results found for: COV2NT   \"Emergent Disease\" Test: No results found for: EDPR  \"SARS-COV-2\" Test: No results found for: XGCOVT  \"[facility]\" Test: No results found for: RSLT  Rapid Test:   Lab Results   Component Value Date/Time    COVR Detected (AA) 12/31/2020 05:49 AM           Imaging:  Xr Chest Port    Result Date: 12/31/2020  EXAM: Chest x-ray. INDICATION: Dyspnea. COMPARISON: Prior chest x-ray on April 9, 2019. TECHNIQUE: 2 frontal views of the chest were obtained. FINDINGS: The lungs are hyperexpanded, consistent with the reported history of COPD. There is new mild infiltrate in the right lung base. The left lung is clear. The heart is borderline enlarged.  The mediastinal contour and pulmonary vasculature are normal. No pneumothorax or pleural effusion is seen. There are degenerative changes in the spine. IMPRESSION: 1. Mild right lung base infiltrate, suspect for acute pneumonia. 2. COPD. No results found for this visit on 12/31/20. Cultures: All Micro Results     Procedure Component Value Units Date/Time    CULTURE, BLOOD [[id number]] Collected: 01/02/21 0843    Order Status: Completed Specimen: Blood Updated: 01/07/21 1045     Special Requests: --        RIGHT  Antecubital       Culture result: NO GROWTH 5 DAYS       CULTURE, BLOOD [[id number]] Collected: 01/02/21 0848    Order Status: Completed Specimen: Blood Updated: 01/07/21 1045     Special Requests: --        LEFT  Antecubital       Culture result: NO GROWTH 5 DAYS       CULTURE, BLOOD [[id number]] Collected: 12/31/20 0309    Order Status: Completed Specimen: Blood Updated: 01/05/21 1038     Special Requests: --        RIGHT  HAND       Culture result: NO GROWTH 5 DAYS       CULTURE, BLOOD [[id number]]  (Abnormal) Collected: 12/31/20 0549    Order Status: Completed Specimen: Blood Updated: 01/03/21 0736     Special Requests: --        NO SPECIAL REQUESTS  RIGHT  FOREARM       GRAM STAIN GRAM POSITIVE COCCI         AEROBIC BOTTLE POSITIVE               RESULTS VERIFIED, PHONED TO AND READ BACK BY GALLITO DSOUZA @ 0451 1/1/21 MM           Culture result:       ALPHA STREPTOCOCCUS, NOT S. PNEUMONIAE                  STAPHYLOCOCCUS SPECIES, COAGULASE NEGATIVE            REFER TO Eustace Boeck [id number]            THIS ORGANISM MAY BE INDICATIVE OF CULTURE CONTAMINATION, HOWEVER, CLINICAL CORRELATION NEEDS TO BE EVALUATED, AS EACH CASE IS UNIQUE.           BLOOD CULTURE ID PANEL [[id number]]  (Abnormal) Collected: 12/31/20 0549    Order Status: Completed Specimen: Blood Updated: 01/01/21 0801     Acc. no. from Micro Order [id number]     Staphylococcus Detected        Comment: RESULTS VERIFIED, PHONED TO AND READ BACK BY  GALLITO DSOUZA @ 0451 1/1/21 MM          Streptococcus Detected        Comment: RESULTS VERIFIED, PHONED TO AND READ BACK BY  MEETRN @ 4710 1/1/21 MM          mecA (Methicillin-Resistance Genes) NOT DETECTED        INTERPRETATION       Multiple organisms detected. Results do not replace susceptibility testing. Assessment/Plan:       Acute respiratory failure with hypoxia secondary to COVID-19 infection:  - On airvo 60 L   - High risk for deterioration requiring BiPAP or intubation  - Continue Decadron  - Refused Remdesivir and convalescent plasma  - Continue home aerosols  - Completed antibiotics for possible CAP  - Wean oxygen as appropriate      Monomorphic ventricular tachycardia:  - Patient found to have monomorphic VT with EMS, converted on his own  - Given Mag in ER  - Cardiology saw him in ER and recommended correcting electrolytes  - Start Amio if it happens again  - Cardiology signed off, didn't write a note?       Bacteremia due to gram positive bacteria:  - Initial blood cultures with strep + staph but both common contaminants  - Repeat cultures NGTD   - If persistent, will need ECHO      AGUSTIN:  - Resolved      Elevated troponin:  - Troponin 72 --> 425 --> 391  - No acute CP  - ?COVID related vs VT  - Repeat troponin until trending down  - Continue ASA + Plavix  - Cardiology assess in ER(?) -->  Previous attending has spoken to them and they think it's COVID      DM (diabetes mellitus) type II:  - A1C on 12/16/20 was 6.8  - Blood sugars in 300s, patient is on steroids   - Increase Lantus to 35 units   - continue Humalog SSI      Coronary artery disease:  - Stable  - Continue ASA + Plavix  - Continue Crestor      COPD:  - No acute wheezing  - Continue home aerosols      PAD:  - Continue ASA + Plavix      Hyperlipidemia:   - Continue Crestor    DIET DIABETIC CONSISTENT CARB Regular  DIET NUTRITIONAL SUPPLEMENTS All Meals; Glucerna Shaanderson ( )    DVT Prophylaxis: Heparin    Discharge Plan: TBD      Signed By: Param Arnett MD     January 10, 2021

## 2021-01-10 NOTE — PROGRESS NOTES
ACUTE PHYSICAL THERAPY GOALS:  (Developed with and agreed upon by patient and/or caregiver.)  STG:  (1.)Mr. Lexx Swartz will move from supine to sit and sit to supine , scoot up and down and roll side to side with CONTACT GUARD ASSIST within 3 treatment day(s). (2.)Mr. Lexx Swartz will transfer from bed to chair and chair to bed with CONTACT GUARD ASSIST using the least restrictive device within 3 treatment day(s). (3.)Mr. Lexx Swartz will ambulate with CONTACT GUARD ASSIST for 40 feet with the least restrictive device within 3 treatment day(s). (4.)Mr. Lexx Swartz will perform standing static and dynamic balance activities x 10 minutes with CONTACT GUARD ASSIST to improve safety within 3 treatment day(s). (5.)Mr. Lexx Swartz will maintain stable vital signs throughout all functional mobility within 3 treatment days.     LTG:  (1.)Mr. Lexx Swartz will move from supine to sit and sit to supine , scoot up and down and roll side to side in bed with SUPERVISION within 7 treatment day(s). (2.)Mr. Lexx Swartz will transfer from bed to chair and chair to bed with SUPERVISION using the least restrictive device within 7 treatment day(s). (3.)Mr. Lexx Swartz will ambulate with SUPERVISION for 100+ feet with the least restrictive device within 7 treatment day(s). (4.)Mr. Lexx Swartz will perform standing static and dynamic balance activities x 15 minutes with SUPERVISION to improve safety within 7 treatment day(s). (5.)Mr. Lexx Swartz will ambulate and/or perform functional activities for 15 consecutive minutes with stable vital signs and no rests required to improve activity tolerance within 7 treatment days.   ________________________________________________________________________________________________    PHYSICAL THERAPY: Daily Note and AM Treatment Day # 3    Varsha Hankins is a 67 y.o. male   PRIMARY DIAGNOSIS: Acute respiratory failure with hypoxia ([facility] [address]  CAP (community acquired pneumonia) [J18.9]         ASSESSMENT:     REHAB RECOMMENDATIONS: CURRENT LEVEL OF FUNCTION:  (Most Recently Demonstrated)   Recommendation to date pending progress:  Setting:   Short-term Rehab vs HHPT hard to say with such limited ability to move  Equipment:    Rolling Walker Bed Mobility:   Not tested  Sit to Stand:  [employer] Assistance  Transfers:   Standby Assistance  Gait/Mobility:   Not tested     ASSESSMENT:  Mr. Enrique Navarro presents sitting up in the recliner with airvo and non rebreather mask. He sated good in the 90's throughout session. He fatigued easily and needed seated rest breaks in between standing exercises. Could likely benefit from rehab as he is quite deconditioned. SUBJECTIVE:   Mr. Enrique Navarro states, \"ok\"    SOCIAL HISTORY/ LIVING ENVIRONMENT: Patient lives in a 2 story home (basement) on the main level with his spouse. His son lives \"200 feet from me. \" He typically is independent with ambulation/ADLs and able to perform yard work at baseline, however does admit to balance challenges even at baseline and experience a fall prior to admission.    Home Environment: Private residence  # Steps to Enter: 1  One/Two Story Residence: Two story, live on 1st floor  Living Alone: No  Support Systems: Spouse/Significant Other/Partner, Child(dileep)  OBJECTIVE:     PAIN: VITAL SIGNS: LINES/DRAINS:   Pre Treatment: Pain Screen  Pain Scale 1: FLACC  Pain Intensity 1: 0  Post Treatment: 0/10     O2 Device: BIPAP     MOBILITY: I Mod I S SBA CGA Min Mod Max Total  NT x2 Comments:   Bed Mobility    Rolling [] [] [] [] [] [] [] [] [] [x] []    Supine to Sit [] [] [] [] [] [] [] [] [] [x] []    Scooting [] [] [] [] [] [] [] [] [] [x] []    Sit to Supine [] [] [] [] [] [] [] [] [] [x] []    Transfers    Sit to Stand [] [] [] [x] [] [] [] [] [] [] []    Bed to Chair [] [] [] [] [] [] [] [] [] [x] []    Stand to Sit [] [] [] [x] [] [] [] [] [] [] []    I=Independent, Mod I=Modified Independent, S=Supervision, SBA=Standby Assistance, CGA=Contact Guard Assistance,   Min=Minimal Assistance, Mod=Moderate Assistance, Max=Maximal Assistance, Total=Total Assistance, NT=Not Tested    GAIT: I Mod I S SBA CGA Min Mod Max Total  NT x2 Comments:   Level of Assistance [] [] [] [] [] [x] [] [] [] [] []    Distance NA    DME Rolling Walker    Gait Quality Decreased step clearance, increased trunk sway    I=Independent, Mod I=Modified Independent, S=Supervision, SBA=Standby Assistance, CGA=Contact Guard Assistance,   Min=Minimal Assistance, Mod=Moderate Assistance, Max=Maximal Assistance, Total=Total Assistance, NT=Not Tested    PLAN:   FREQUENCY/DURATION: PT Plan of Care: 3 times/week for duration of hospital stay or until stated goals are met, whichever comes first.  TREATMENT:     TREATMENT:   ($$ Therapeutic Exercises: 23-37 mins    )    Therapeutic Exercise: (  25 minutes):  Exercises per grid below to improve mobility, strength and endurance. Required minimal visual and verbal cues to promote proper body mechanics. Progressed complexity of movement as indicated.        Date:  01/07/21 Date:  1/10/20 Date:     Activity/Exercise Parameters Parameters Parameters   Ankle pumps x10 B A 10x B    LAQ x10 B A 20x B    Seated marching x10 B A 20x B    Seated hip abd  20x B    Calf raises  10x B in walker    Sit to stand  5x    Standing hip abd  10x B    Standing marching  10x B      AFTER TREATMENT POSITION/PRECAUTIONS:  Alarm Activated, Chair, Needs within reach and RN notified    INTERDISCIPLINARY COLLABORATION:  RN/PCT and PT/PTA    TOTAL TREATMENT DURATION:  PT Patient Time In/Time Out  Time In: 1035  Time Out: 18 [facility]

## 2021-01-11 ENCOUNTER — APPOINTMENT (OUTPATIENT)
Dept: GENERAL RADIOLOGY | Age: 76
DRG: 177 | End: 2021-01-11
Attending: INTERNAL MEDICINE
Payer: MEDICARE

## 2021-01-11 LAB
ALBUMIN SERPL-MCNC: 2.5 G/DL (ref 3.2–4.6)
ALBUMIN/GLOB SERPL: 0.6 {RATIO} (ref 1.2–3.5)
ALP SERPL-CCNC: 81 U/L (ref 50–136)
ALT SERPL-CCNC: 61 U/L (ref 12–65)
ANION GAP SERPL CALC-SCNC: 5 MMOL/L (ref 7–16)
AST SERPL-CCNC: 46 U/L (ref 15–37)
BASOPHILS # BLD: 0 K/UL (ref 0–0.2)
BASOPHILS NFR BLD: 0 % (ref 0–2)
BILIRUB SERPL-MCNC: 0.8 MG/DL (ref 0.2–1.1)
BUN SERPL-MCNC: 21 MG/DL (ref 8–23)
CALCIUM SERPL-MCNC: 8.8 MG/DL (ref 8.3–10.4)
CHLORIDE SERPL-SCNC: 98 MMOL/L (ref 98–107)
CO2 SERPL-SCNC: 32 MMOL/L (ref 21–32)
CREAT SERPL-MCNC: 0.81 MG/DL (ref 0.8–1.5)
DIFFERENTIAL METHOD BLD: ABNORMAL
EOSINOPHIL # BLD: 0 K/UL (ref 0–0.8)
EOSINOPHIL NFR BLD: 0 % (ref 0.5–7.8)
ERYTHROCYTE [DISTWIDTH] IN BLOOD BY AUTOMATED COUNT: 14.5 % (ref 11.9–14.6)
GLOBULIN SER CALC-MCNC: 4.2 G/DL (ref 2.3–3.5)
GLUCOSE BLD STRIP.AUTO-MCNC: 111 MG/DL (ref 65–100)
GLUCOSE BLD STRIP.AUTO-MCNC: 124 MG/DL (ref 65–100)
GLUCOSE BLD STRIP.AUTO-MCNC: 128 MG/DL (ref 65–100)
GLUCOSE BLD STRIP.AUTO-MCNC: 95 MG/DL (ref 65–100)
GLUCOSE SERPL-MCNC: 110 MG/DL (ref 65–100)
HCT VFR BLD AUTO: 32.2 % (ref 41.1–50.3)
HGB BLD-MCNC: 10.7 G/DL (ref 13.6–17.2)
IMM GRANULOCYTES # BLD AUTO: 0.1 K/UL (ref 0–0.5)
IMM GRANULOCYTES NFR BLD AUTO: 1 % (ref 0–5)
LYMPHOCYTES # BLD: 0.5 K/UL (ref 0.5–4.6)
LYMPHOCYTES NFR BLD: 6 % (ref 13–44)
MCH RBC QN AUTO: 32.3 PG (ref 26.1–32.9)
MCHC RBC AUTO-ENTMCNC: 33.2 G/DL (ref 31.4–35)
MCV RBC AUTO: 97.3 FL (ref 79.6–97.8)
MONOCYTES # BLD: 0.8 K/UL (ref 0.1–1.3)
MONOCYTES NFR BLD: 11 % (ref 4–12)
NEUTS SEG # BLD: 6 K/UL (ref 1.7–8.2)
NEUTS SEG NFR BLD: 81 % (ref 43–78)
NRBC # BLD: 0 K/UL (ref 0–0.2)
PLATELET # BLD AUTO: 218 K/UL (ref 150–450)
PMV BLD AUTO: 11.1 FL (ref 9.4–12.3)
POTASSIUM SERPL-SCNC: 4.9 MMOL/L (ref 3.5–5.1)
PROT SERPL-MCNC: 6.7 G/DL (ref 6.3–8.2)
RBC # BLD AUTO: 3.31 M/UL (ref 4.23–5.6)
SODIUM SERPL-SCNC: 135 MMOL/L (ref 138–145)
WBC # BLD AUTO: 7.4 K/UL (ref 4.3–11.1)

## 2021-01-11 PROCEDURE — 94660 CPAP INITIATION&MGMT: CPT

## 2021-01-11 PROCEDURE — 65270000029 HC RM PRIVATE

## 2021-01-11 PROCEDURE — 94640 AIRWAY INHALATION TREATMENT: CPT

## 2021-01-11 PROCEDURE — 74011250637 HC RX REV CODE- 250/637: Performed by: INTERNAL MEDICINE

## 2021-01-11 PROCEDURE — 85025 COMPLETE CBC W/AUTO DIFF WBC: CPT

## 2021-01-11 PROCEDURE — 77010033678 HC OXYGEN DAILY

## 2021-01-11 PROCEDURE — 82962 GLUCOSE BLOOD TEST: CPT

## 2021-01-11 PROCEDURE — 80053 COMPREHEN METABOLIC PANEL: CPT

## 2021-01-11 PROCEDURE — 71045 X-RAY EXAM CHEST 1 VIEW: CPT

## 2021-01-11 PROCEDURE — 36415 COLL VENOUS BLD VENIPUNCTURE: CPT

## 2021-01-11 PROCEDURE — 94760 N-INVAS EAR/PLS OXIMETRY 1: CPT

## 2021-01-11 PROCEDURE — 74011250636 HC RX REV CODE- 250/636: Performed by: INTERNAL MEDICINE

## 2021-01-11 RX ORDER — INSULIN GLARGINE 100 [IU]/ML
20 INJECTION, SOLUTION SUBCUTANEOUS
Status: DISCONTINUED | OUTPATIENT
Start: 2021-01-11 | End: 2021-01-11

## 2021-01-11 RX ORDER — INSULIN GLARGINE 100 [IU]/ML
20 INJECTION, SOLUTION SUBCUTANEOUS DAILY
Status: DISCONTINUED | OUTPATIENT
Start: 2021-01-11 | End: 2021-01-11

## 2021-01-11 RX ADMIN — ALBUTEROL SULFATE 2 PUFF: 108 INHALANT RESPIRATORY (INHALATION) at 08:10

## 2021-01-11 RX ADMIN — Medication 10 ML: at 06:02

## 2021-01-11 RX ADMIN — HEPARIN SODIUM 5000 UNITS: 5000 INJECTION INTRAVENOUS; SUBCUTANEOUS at 09:15

## 2021-01-11 RX ADMIN — MONTELUKAST SODIUM 10 MG: 10 TABLET, FILM COATED ORAL at 22:12

## 2021-01-11 RX ADMIN — ACETAMINOPHEN 650 MG: 325 TABLET, FILM COATED ORAL at 20:22

## 2021-01-11 RX ADMIN — BUDESONIDE AND FORMOTEROL FUMARATE DIHYDRATE 2 PUFF: 160; 4.5 AEROSOL RESPIRATORY (INHALATION) at 08:10

## 2021-01-11 RX ADMIN — Medication 10 ML: at 22:13

## 2021-01-11 RX ADMIN — Medication 10 ML: at 13:05

## 2021-01-11 RX ADMIN — ASPIRIN 81 MG: 81 TABLET, COATED ORAL at 20:22

## 2021-01-11 RX ADMIN — HEPARIN SODIUM 5000 UNITS: 5000 INJECTION INTRAVENOUS; SUBCUTANEOUS at 17:00

## 2021-01-11 RX ADMIN — BUDESONIDE AND FORMOTEROL FUMARATE DIHYDRATE 2 PUFF: 160; 4.5 AEROSOL RESPIRATORY (INHALATION) at 20:04

## 2021-01-11 RX ADMIN — ROSUVASTATIN 10 MG: 10 TABLET, FILM COATED ORAL at 22:12

## 2021-01-11 RX ADMIN — ALBUTEROL SULFATE 2 PUFF: 108 INHALANT RESPIRATORY (INHALATION) at 20:04

## 2021-01-11 RX ADMIN — MAGNESIUM GLUCONATE 500 MG ORAL TABLET 400 MG: 500 TABLET ORAL at 09:15

## 2021-01-11 RX ADMIN — LORATADINE 10 MG: 10 TABLET ORAL at 09:16

## 2021-01-11 RX ADMIN — CLOPIDOGREL BISULFATE 75 MG: 75 TABLET ORAL at 09:15

## 2021-01-11 NOTE — PROGRESS NOTES
01/11/21 1623   Oxygen Therapy   O2 Sat (%) (!) 75 %   Pulse via Oximetry 98 beats per minute   O2 Device BIPAP   Respiratory   Respiratory (WDL) X   Respiratory Pattern Dyspnea at rest   Chest/Tracheal Assessment Chest expansion, symmetrical   Breath Sounds Bilateral Diminished   Cough Productive; Congested;Strong   CPAP/BIPAP   CPAP/BIPAP Start/Stop On   Mask Type and Size Full face; Large   Skin Condition intact   PIP Observed 10 cm H20   EPAP (cm H2O) 8 cm H2O   Vt Spont (ml) 698 ml   Ve Observed (l/min) 20 l/min   Total RR (Spontaneous) 29 breaths per minute   Leak (Estimated) 76 L/min   Pt's Home Machine No   Biomedical Check Performed Yes   Settings Verified Yes   Alarm Settings   High Pressure 30   Low Pressure 5   Apnea 20   Low Ve 2   High Rate 50   Low Rate 8   Pulmonary Toilet   Pulmonary Toilet H. O.B elevated     Placed patient on Airvo 60L/100%. Patient desatted to 80-85%. Patient coughing up thick, blood tinged secretions. Patient contacted wife, and spoke with her via cell phone. Returned patient to CPAP at documented settings. Patient SpO2 increased to 94%. RN at bedside.

## 2021-01-11 NOTE — PROGRESS NOTES
01/11/21 0810   Oxygen Therapy   O2 Sat (%) 98 %   Pulse via Oximetry 81 beats per minute   O2 Device CPAP mask   Respiratory   Respiratory (WDL) X   Respiratory Pattern Dyspnea at rest   Chest/Tracheal Assessment Chest expansion, symmetrical   Breath Sounds Bilateral Diminished   CPAP/BIPAP   Device Mode CPAP   Mask Type and Size Full face; Large   Skin Condition intact   PIP Observed 10 cm H20   EPAP (cm H2O) 8 cm H2O   Vt Spont (ml) 911 ml   Ve Observed (l/min) 21 l/min   Total RR (Spontaneous) 23 breaths per minute   Leak (Estimated) 23 L/min   Pt's Home Machine No   Biomedical Check Performed Yes   Settings Verified Yes   Alarm Settings   High Pressure 30   Low Pressure 5   Apnea 20   Low Ve 2   High Rate 50   Low Rate 8   Pulmonary Toilet   Pulmonary Toilet H. O.B elevated     Patient placed on Airvo, 60L/100%. Patient immediately desatted to 83%.   Patient placed back on CPAP with documented settings

## 2021-01-11 NOTE — PROGRESS NOTES
Per monitor room, pt had a 10 second run of SVT. Pt now NSR. No signs of distress. Dr. Margarita Heredia notified, no orders received at this time. Will continue to monitor.

## 2021-01-11 NOTE — PROGRESS NOTES
Hospitalist Progress Note    Patient: Jennifer Brasher MRN: [id number]  SSN: [id number]    YOB: 1945  Age: 67 y.o. Sex: male      Admit Date: 12/31/2020    LOS: 11 days     Subjective:   68 yo CM with past history of CAD s/p stents, ischemic cardiomyopathy, PAD (on Plavix and cilostazo), COPD, DM type II, HTN, HLD presents via EMS after fall at home and was admitted due to acute respiratory failure due to COVID and had an episode of VTach. Family has refused for remdesivir and convalescent plasma. 1/11: Patient is on 60 L Airvo/nonrebreather and Bipap at night. Unable to wean Bipap in a.m. due to desats in low 80s. We will continue  Bipap at this time and reassess in the afternoon. Tried to call wife couple of times today to update on patient's current condition. No answer. Review of systems negative except stated above. Objective:     Visit Vitals  /68 (BP 1 Location: Left arm, BP Patient Position: At rest;Supine)   Pulse 77   Temp 99.1 °F (37.3 °C)   Resp 20   Ht 6' 1\" (1.854 m)   Wt 131.1 kg (289 lb)   SpO2 98%   BMI 38.13 kg/m²      Oxygen Therapy  O2 Sat (%): 98 % (01/11/21 0810)  Pulse via Oximetry: 81 beats per minute (01/11/21 0810)  O2 Device: CPAP mask (01/11/21 0810)  O2 Flow Rate (L/min): 60 l/min (01/10/21 2024)  O2 Temperature: 87.8 °F (31 °C) (01/10/21 2024)  FIO2 (%): 100 % (01/11/21 0227)      Intake and Output:     Intake/Output Summary (Last 24 hours) at 1/11/2021 1240  Last data filed at 1/10/2021 1700  Gross per 24 hour   Intake    Output 100 ml   Net -100 ml         Physical Exam:   GENERAL: alert, cooperative, moderate resp distress, appears stated age  EYE: conjunctivae/corneas clear. PERRL. THROAT & NECK: normal and no erythema or exudates noted. LUNG: clear to auscultation bilaterally  HEART: regular rate and rhythm, S1S2, no murmur, no JVD  ABDOMEN: soft, non-tender, non-distended.  Bowel sounds normal.   EXTREMITIES:  No edema, 2+ pedal/radial pulses bilaterally  SKIN: no rash or abnormalities  NEUROLOGIC: A&Ox3. Cranial nerves 2-12 grossly intact. Lab/Data Review:  Recent Results (from the past 24 hour(s))   GLUCOSE, POC    Collection Time: 01/10/21  4:03 PM   Result Value Ref Range    Glucose (POC) 84 65 - 100 mg/dL   GLUCOSE, POC    Collection Time: 01/10/21  8:55 PM   Result Value Ref Range    Glucose (POC) 114 (H) 65 - 100 mg/dL   GLUCOSE, POC    Collection Time: 01/11/21  8:27 AM   Result Value Ref Range    Glucose (POC) 95 65 - 100 mg/dL   CBC WITH AUTOMATED DIFF    Collection Time: 01/11/21 11:07 AM   Result Value Ref Range    WBC 7.4 4.3 - 11.1 K/uL    RBC 3.31 (L) 4.23 - 5.6 M/uL    HGB 10.7 (L) 13.6 - 17.2 g/dL    HCT 32.2 (L) 41.1 - 50.3 %    MCV 97.3 79.6 - 97.8 FL    MCH 32.3 26.1 - 32.9 PG    MCHC 33.2 31.4 - 35.0 g/dL    RDW 14.5 11.9 - 14.6 %    PLATELET 928 669 - 725 K/uL    MPV 11.1 9.4 - 12.3 FL    ABSOLUTE NRBC 0.00 0.0 - 0.2 K/uL    DF AUTOMATED      NEUTROPHILS 81 (H) 43 - 78 %    LYMPHOCYTES 6 (L) 13 - 44 %    MONOCYTES 11 4.0 - 12.0 %    EOSINOPHILS 0 (L) 0.5 - 7.8 %    BASOPHILS 0 0.0 - 2.0 %    IMMATURE GRANULOCYTES 1 0.0 - 5.0 %    ABS. NEUTROPHILS 6.0 1.7 - 8.2 K/UL    ABS. LYMPHOCYTES 0.5 0.5 - 4.6 K/UL    ABS. MONOCYTES 0.8 0.1 - 1.3 K/UL    ABS. EOSINOPHILS 0.0 0.0 - 0.8 K/UL    ABS. BASOPHILS 0.0 0.0 - 0.2 K/UL    ABS. IMM. GRANS. 0.1 0.0 - 0.5 K/UL   GLUCOSE, POC    Collection Time: 01/11/21 11:49 AM   Result Value Ref Range    Glucose (POC) 111 (H) 65 - 100 mg/dL       SARS-CoV-2 Lab Results  \"Novel Coronavirus\" Test: No results found for: COV2NT   \"Emergent Disease\" Test: No results found for: EDPR  \"SARS-COV-2\" Test: No results found for: XGCOVT  \"Precision Labs\" Test: No results found for: RSLT  Rapid Test:   Lab Results   Component Value Date/Time    COVR Detected (AA) 12/31/2020 05:49 AM           Imaging:  Xr Chest Port    Result Date: 12/31/2020  EXAM: Chest x-ray. INDICATION: Dyspnea.  COMPARISON: Prior chest x-ray on April 9, 2019. TECHNIQUE: 2 frontal views of the chest were obtained. FINDINGS: The lungs are hyperexpanded, consistent with the reported history of COPD. There is new mild infiltrate in the right lung base. The left lung is clear. The heart is borderline enlarged. The mediastinal contour and pulmonary vasculature are normal. No pneumothorax or pleural effusion is seen. There are degenerative changes in the spine. IMPRESSION: 1. Mild right lung base infiltrate, suspect for acute pneumonia. 2. COPD. No results found for this visit on 12/31/20. Cultures: All Micro Results     Procedure Component Value Units Date/Time    CULTURE, BLOOD [[id number]] Collected: 01/02/21 0843    Order Status: Completed Specimen: Blood Updated: 01/07/21 1045     Special Requests: --        RIGHT  Antecubital       Culture result: NO GROWTH 5 DAYS       CULTURE, BLOOD [[id number]] Collected: 01/02/21 0848    Order Status: Completed Specimen: Blood Updated: 01/07/21 1045     Special Requests: --        LEFT  Antecubital       Culture result: NO GROWTH 5 DAYS       CULTURE, BLOOD [[id number]] Collected: 12/31/20 0309    Order Status: Completed Specimen: Blood Updated: 01/05/21 1038     Special Requests: --        RIGHT  HAND       Culture result: NO GROWTH 5 DAYS       CULTURE, BLOOD [[id number]]  (Abnormal) Collected: 12/31/20 0549    Order Status: Completed Specimen: Blood Updated: 01/03/21 0736     Special Requests: --        NO SPECIAL REQUESTS  RIGHT  FOREARM       GRAM STAIN GRAM POSITIVE COCCI         AEROBIC BOTTLE POSITIVE               RESULTS VERIFIED, PHONED TO AND READ BACK BY MEET,GALLITO @ 0456 1/1/21 MM           Culture result:       ALPHA STREPTOCOCCUS, NOT S.  PNEUMONIAE                  STAPHYLOCOCCUS SPECIES, COAGULASE NEGATIVE            REFER TO Aris Flores Dr [id number]            THIS ORGANISM MAY BE INDICATIVE OF CULTURE CONTAMINATION, HOWEVER, CLINICAL CORRELATION NEEDS TO BE EVALUATED, AS EACH CASE IS UNIQUE. BLOOD CULTURE ID PANEL [[id number]]  (Abnormal) Collected: 12/31/20 0549    Order Status: Completed Specimen: Blood Updated: 01/01/21 0801     Acc. no. from Micro Order [id number]     Staphylococcus Detected        Comment: RESULTS VERIFIED, PHONED TO AND READ BACK BY  GALLITO DSOUZA @ 0451 1/1/21 MM          Streptococcus Detected        Comment: RESULTS VERIFIED, PHONED TO AND READ BACK BY  GALLITO DSOUZA @ 0451 1/1/21 MM          mecA (Methicillin-Resistance Genes) NOT DETECTED        INTERPRETATION       Multiple organisms detected. Results do not replace susceptibility testing. Assessment/Plan:       Acute respiratory failure with hypoxia secondary to COVID-19 infection:  - On Bipap, unable to wean to airvo in the morning due to desats in low 80s  - High risk for deterioration requiring mechanical ventilation  - Continue Decadron  - Refused Remdesivir and convalescent plasma initially  - Continue home aerosols  - Completed antibiotics for possible CAP  - Wean oxygen as appropriate      Monomorphic ventricular tachycardia:  - Patient found to have monomorphic VT with EMS, converted on his own  - Given Mag in ER  - Cardiology saw him in ER and recommended correcting electrolytes  - Start Amio if it happens again  - Cardiology signed off, didn't write a note?       Bacteremia due to gram positive bacteria:  - Initial blood cultures with strep + staph but both common contaminants  - Repeat cultures NGTD   - If persistent, will need ECHO      AGUSTIN:  - Resolved      Elevated troponin:  - Troponin 72 --> 425 --> 391  - No acute CP  - ?COVID related vs VT  - Repeat troponin until trending down  - Continue ASA + Plavix  - Cardiology assess in ER(?) -->  Previous attending has spoken to them and they think it's COVID      DM (diabetes mellitus) type II:  - A1C on 12/16/20 was 6.8  - Blood sugars in 300s, patient is on steroids   - Increase Lantus to 35 units   - continue Humalog SSI      Coronary artery disease:  - Stable  - Continue ASA + Plavix  - Continue Crestor      COPD:  - No acute wheezing  - Continue home aerosols      PAD:  - Continue ASA + Plavix      Hyperlipidemia:   - Continue Crestor    DIET DIABETIC CONSISTENT CARB Regular  DIET NUTRITIONAL SUPPLEMENTS All Meals; Luzmaria Johnston ( )    DVT Prophylaxis: Heparin    Discharge Plan: TBD      Signed By: Latha Chatterjee MD     January 11, 2021

## 2021-01-11 NOTE — CONSULTS
[facility] Critical Care COVID-19 Note:    Critical Care Note: 1/11/2021    Marialuisa Villanueva  Admission Date: 12/31/2020     Length of Stay: 11 days    Background: 67 y.o. y/o male with acute hypoxemic respiratory failure secondary to COVID-19. Date of COVID-19 symptom onset:    Notable PMH: 68 yo CM with past history of CAD s/p stents, ischemic cardiomyopathy, PAD (on Plavix and cilostazo), COPD, DM type II, HTN, HLD presents via EMS after fall at home and was admitted due to acute respiratory failure due to COVID and had an episode of VTach @ 250bpm.  Covid screen came back positive on 12/31. Family has refused for remdesivir and convalescent plasma. Patient has been managed on the floor now with escalating O2 requirement     1/11: Patient is on 60 L Airvo/nonrebreather and Bipap at night. Unable to wean Bipap in a.m. due to desats in low 80s. We will continue  Bipap at this time and reassess in the afternoon. Patient seen @ [phone] and found to be on CPAP pressure of 8 on 100% O2 satting 98%, spent ~25 minutes examining and chatting with patient about goals of care, clinical history and possibilities for his future course, weaned to 75% O2 during this time. Drug Allergies: Allergies   Allergen Reactions    Daypro [Oxaprozin] Other (comments)     ELEVATED BLOOD PRESSURE           REVIEW OF SYSTEMS:  Constitutional:  denies fever, No chills, night sweats, weight loss, weight gain  Eyes:  Denies problems with eye pain, erythema, blurred vision, or visual field loss. ENTM:  Denies sore throat, deniesloss of taste or smell  Lymph:  Denies swollen glands. Cardiac:  No chest pain, pressure, discomfort, palpitations, orthopnea, murmurs, or edema. GI:  No dysphagia, heartburn reflux, nausea/vomiting, diarrhea, abdominal pain, or bleeding. :Denies history of dysuria, hematuria, polyuria, or decreased urine output.   MS:  No history of myalgias, arthralgias, bone pain, or muscle cramps. Skin:  No history of rashes, jaundice, cyanosis, nodules, or ulcers. Endo:  Negative for heat or cold intolerance. No polyuria/polydipsia  Psych:  Mild anxiety, No depression, insomnia, hallucinations. Neuro: There is no history of AMS, persistent headache, decreased level of consciousness, seizures, or motor or sensory deficits. Visit Vitals  BP (!) 147/77 (BP 1 Location: Right arm, BP Patient Position: At rest;Supine)   Pulse 77   Temp 36.5 °C (97.7 °F)   Resp 22   Ht 6' 1\" (1.854 m)   Wt 131.1 kg (289 lb)   SpO2 94%   BMI 38.13 kg/m²       I/O:    No intake or output data in the 24 hours ending 01/11/21 1720     Pertinent Exam:            Constitutional: awake, alert, increased work of breathing. EENMT:  Sclera clear, pupils equal, oral mucosa moist  Respiratory: crackles bilateral, rhonchi R>L some tightness but not wheezing  Cardiovascular:  RRR with no M,G,R;  Gastrointestinal:  soft with no tenderness; positive bowel sounds present  Musculoskeletal:  warm with no cyanosis, +1 lower extremity edema  Skin:  no jaundice or ecchymosis  Neurologic: no gross neuro deficits     Psychiatric: no hallucinations, prefer to think positively, does not like talk of doom and gloom    CXR:    Lines (insertion date): PIVs    Drips:    COVID-19 Meds:  Dexamethasone 6mg daily (12/31- 1/9)  Remdesivir patient/family refused? Convalescent plasma not used. SARS-CoV-2 LAB Results    Microbiology Results  Date  Source Culture Result          Anti-infectives (start date):   Rocephin given 12/31 and 1/1  Doxycycline 12/31 - 1/4  No abx currently.     Pertinent Labs:   Recent Labs     01/11/21  1107 01/10/21  0719 01/09/21  0520   WBC 7.4 6.6 14.5*   HGB 10.7* 11.2* 9.1*   HCT 32.2* 32.6* 26.9*    184 99*     Recent Labs     01/11/21  1107 01/10/21  0719 01/09/21  1450   * 135* 134*   K 4.9 4.9 5.6*   CL 98 100 100   CO2 32 33* 30   * 113* 237*   BUN 21 23 29*   CREA 0.81 0.84 0.93   CA 8.8 8.6 9. 1   ALB 2.5* 2.5* 2.5*   TBILI 0.8 0.6 0.6   ALT 61 63 68*     Recent Labs     01/11/21  1620 01/11/21  1149 01/11/21  0827   GLUCPOC 128* 111* 95       Oxygen Requirements:  Date O2 support mode FiO2 SaO2           IMPRESSION:  Active Hospital Problems    Diagnosis Date Noted    CAP (community acquired pneumonia) 12/31/2020    Monomorphic ventricular tachycardia ([facility] [address] 12/31/2020    COVID-19 12/31/2020    Elevated troponin 12/31/2020    Hyperlipidemia 11/10/2015    PAD (peripheral artery disease) ([facility] [address] 11/10/2015    COPD (chronic obstructive pulmonary disease) ([facility] [address] 04/10/2015    Acute respiratory failure with hypoxia ([facility] [address] 10/23/2014    AGUSTIN (acute kidney injury) ([facility] [address] 09/15/2014    DM (diabetes mellitus) type II, controlled, with peripheral vascular disorder ([facility] [address] 09/11/2014    Morbid obesity ([facility] [address] 10/12/2011    Coronary artery disease involving native coronary artery of native heart without angina pectoris 10/12/2011       67 y.o. y/o male with acute hypoxemic respiratory failure secondary to COVID-19. PLAN:  1. Acute respiratory failure with hypoxia: supportive measures with noninvasive oxygen strategy unless tiring or desaturation. Self-proning encouraged. Lasix for diuresis (20mg IV BID ordered)  2. COVID-19 pneumonia:  Completed decadron course  3. Nutrition: PO per medicine. 4. PPx:  DVT ppx ongoing. (on plavix 2/2 CAD) will recheck dimer, may consider adding heparin/lovenox. 5. Does not meet need for ICU/Intubation at current on 75% CPAP/8Peep, however if he continues to decline and requires 100% Bipap with sats< 93% he will require new attention for potential intubation. Full Code  Discussed the options of DNI, patient unsure and wants to talk to his family more.      The patient is critically ill with respiratory failure and requires high complexity decision making for assessment and support including frequent evaluation and titration of therapies, application of advanced monitoring technologies & timing & modality of advanced respiratory support.   Cumulative time devoted to patient care services, counseling and coordination of care  by me for day of service -Pola Glasgow MD

## 2021-01-11 NOTE — PROGRESS NOTES
Bedside report received from Dav, [facility]. Pt resting quietly in bed with eyes closed. Respirations even and unlabored on Bipap. Tele in place per MD order. No visual signs of pain or distress. Safety measures in place, call light within reach. Will continue to monitor.

## 2021-01-11 NOTE — DIABETES MGMT
Patient's blood glucose ranged  yesterday with patient receiving Lantus 35 units and Humalog 30 units. Blood glucose 95 this morning. Steroids have completed. Spoke with provider and order received to stop prandial Humalog and reduce Lantus to 20 units QHS if blood glucose trends down today primary nurse should call before giving Lantus tonight and clarify if patient needs it or if it should be discontinued. Patient was on oral medications at home.

## 2021-01-11 NOTE — PROGRESS NOTES
Pt unable to maintain oxygen saturations above 80% on Airvo. Pt placed back on CPAP. RT at bedside. No signs of distress. Dr. Lorie Guadarrama notified of oxygen demand.

## 2021-01-12 LAB
ALBUMIN SERPL-MCNC: 2.3 G/DL (ref 3.2–4.6)
ALBUMIN/GLOB SERPL: 0.5 {RATIO} (ref 1.2–3.5)
ALP SERPL-CCNC: 88 U/L (ref 50–136)
ALT SERPL-CCNC: 53 U/L (ref 12–65)
ANION GAP SERPL CALC-SCNC: 6 MMOL/L (ref 7–16)
AST SERPL-CCNC: 41 U/L (ref 15–37)
BASOPHILS # BLD: 0 K/UL (ref 0–0.2)
BASOPHILS NFR BLD: 0 % (ref 0–2)
BILIRUB SERPL-MCNC: 0.9 MG/DL (ref 0.2–1.1)
BUN SERPL-MCNC: 21 MG/DL (ref 8–23)
CALCIUM SERPL-MCNC: 8.6 MG/DL (ref 8.3–10.4)
CHLORIDE SERPL-SCNC: 95 MMOL/L (ref 98–107)
CO2 SERPL-SCNC: 30 MMOL/L (ref 21–32)
CREAT SERPL-MCNC: 0.89 MG/DL (ref 0.8–1.5)
DIFFERENTIAL METHOD BLD: ABNORMAL
EOSINOPHIL # BLD: 0 K/UL (ref 0–0.8)
EOSINOPHIL NFR BLD: 0 % (ref 0.5–7.8)
ERYTHROCYTE [DISTWIDTH] IN BLOOD BY AUTOMATED COUNT: 14.6 % (ref 11.9–14.6)
GLOBULIN SER CALC-MCNC: 4.4 G/DL (ref 2.3–3.5)
GLUCOSE BLD STRIP.AUTO-MCNC: 122 MG/DL (ref 65–100)
GLUCOSE BLD STRIP.AUTO-MCNC: 143 MG/DL (ref 65–100)
GLUCOSE BLD STRIP.AUTO-MCNC: 167 MG/DL (ref 65–100)
GLUCOSE BLD STRIP.AUTO-MCNC: 178 MG/DL (ref 65–100)
GLUCOSE SERPL-MCNC: 171 MG/DL (ref 65–100)
HCT VFR BLD AUTO: 32.9 % (ref 41.1–50.3)
HGB BLD-MCNC: 10.9 G/DL (ref 13.6–17.2)
IMM GRANULOCYTES # BLD AUTO: 0.1 K/UL (ref 0–0.5)
IMM GRANULOCYTES NFR BLD AUTO: 1 % (ref 0–5)
LYMPHOCYTES # BLD: 0.5 K/UL (ref 0.5–4.6)
LYMPHOCYTES NFR BLD: 4 % (ref 13–44)
MCH RBC QN AUTO: 32.6 PG (ref 26.1–32.9)
MCHC RBC AUTO-ENTMCNC: 33.1 G/DL (ref 31.4–35)
MCV RBC AUTO: 98.5 FL (ref 79.6–97.8)
MONOCYTES # BLD: 0.9 K/UL (ref 0.1–1.3)
MONOCYTES NFR BLD: 9 % (ref 4–12)
NEUTS SEG # BLD: 9.2 K/UL (ref 1.7–8.2)
NEUTS SEG NFR BLD: 86 % (ref 43–78)
NRBC # BLD: 0 K/UL (ref 0–0.2)
PLATELET # BLD AUTO: 230 K/UL (ref 150–450)
PMV BLD AUTO: 10.4 FL (ref 9.4–12.3)
POTASSIUM SERPL-SCNC: 4.9 MMOL/L (ref 3.5–5.1)
PROT SERPL-MCNC: 6.7 G/DL (ref 6.3–8.2)
RBC # BLD AUTO: 3.34 M/UL (ref 4.23–5.6)
SODIUM SERPL-SCNC: 131 MMOL/L (ref 138–145)
WBC # BLD AUTO: 10.8 K/UL (ref 4.3–11.1)

## 2021-01-12 PROCEDURE — 74011250637 HC RX REV CODE- 250/637: Performed by: INTERNAL MEDICINE

## 2021-01-12 PROCEDURE — 80053 COMPREHEN METABOLIC PANEL: CPT

## 2021-01-12 PROCEDURE — 36415 COLL VENOUS BLD VENIPUNCTURE: CPT

## 2021-01-12 PROCEDURE — 94660 CPAP INITIATION&MGMT: CPT

## 2021-01-12 PROCEDURE — 65270000029 HC RM PRIVATE

## 2021-01-12 PROCEDURE — 74011636637 HC RX REV CODE- 636/637: Performed by: INTERNAL MEDICINE

## 2021-01-12 PROCEDURE — 94762 N-INVAS EAR/PLS OXIMTRY CONT: CPT

## 2021-01-12 PROCEDURE — 82962 GLUCOSE BLOOD TEST: CPT

## 2021-01-12 PROCEDURE — 85025 COMPLETE CBC W/AUTO DIFF WBC: CPT

## 2021-01-12 PROCEDURE — 74011250636 HC RX REV CODE- 250/636: Performed by: INTERNAL MEDICINE

## 2021-01-12 PROCEDURE — 94640 AIRWAY INHALATION TREATMENT: CPT

## 2021-01-12 PROCEDURE — 74011250636 HC RX REV CODE- 250/636: Performed by: ANESTHESIOLOGY

## 2021-01-12 RX ORDER — FUROSEMIDE 10 MG/ML
20 INJECTION INTRAMUSCULAR; INTRAVENOUS 2 TIMES DAILY
Status: DISCONTINUED | OUTPATIENT
Start: 2021-01-12 | End: 2021-01-26 | Stop reason: HOSPADM

## 2021-01-12 RX ADMIN — Medication 10 ML: at 05:18

## 2021-01-12 RX ADMIN — FUROSEMIDE 20 MG: 10 INJECTION INTRAMUSCULAR; INTRAVENOUS at 17:07

## 2021-01-12 RX ADMIN — CLOPIDOGREL BISULFATE 75 MG: 75 TABLET ORAL at 08:24

## 2021-01-12 RX ADMIN — ASPIRIN 81 MG: 81 TABLET, COATED ORAL at 22:26

## 2021-01-12 RX ADMIN — FUROSEMIDE 20 MG: 10 INJECTION INTRAMUSCULAR; INTRAVENOUS at 08:23

## 2021-01-12 RX ADMIN — MONTELUKAST SODIUM 10 MG: 10 TABLET, FILM COATED ORAL at 22:23

## 2021-01-12 RX ADMIN — HEPARIN SODIUM 5000 UNITS: 5000 INJECTION INTRAVENOUS; SUBCUTANEOUS at 00:15

## 2021-01-12 RX ADMIN — LORATADINE 10 MG: 10 TABLET ORAL at 08:24

## 2021-01-12 RX ADMIN — ALBUTEROL SULFATE 2 PUFF: 108 INHALANT RESPIRATORY (INHALATION) at 21:24

## 2021-01-12 RX ADMIN — INSULIN LISPRO 2 UNITS: 100 INJECTION, SOLUTION INTRAVENOUS; SUBCUTANEOUS at 22:26

## 2021-01-12 RX ADMIN — HEPARIN SODIUM 5000 UNITS: 5000 INJECTION INTRAVENOUS; SUBCUTANEOUS at 17:06

## 2021-01-12 RX ADMIN — BUDESONIDE AND FORMOTEROL FUMARATE DIHYDRATE 2 PUFF: 160; 4.5 AEROSOL RESPIRATORY (INHALATION) at 21:24

## 2021-01-12 RX ADMIN — Medication 10 ML: at 15:12

## 2021-01-12 RX ADMIN — Medication 10 ML: at 22:25

## 2021-01-12 RX ADMIN — HEPARIN SODIUM 5000 UNITS: 5000 INJECTION INTRAVENOUS; SUBCUTANEOUS at 08:22

## 2021-01-12 RX ADMIN — ROSUVASTATIN 10 MG: 10 TABLET, FILM COATED ORAL at 22:23

## 2021-01-12 RX ADMIN — ACETAMINOPHEN 650 MG: 325 TABLET, FILM COATED ORAL at 17:06

## 2021-01-12 RX ADMIN — MAGNESIUM GLUCONATE 500 MG ORAL TABLET 400 MG: 500 TABLET ORAL at 08:24

## 2021-01-12 RX ADMIN — INSULIN LISPRO 2 UNITS: 100 INJECTION, SOLUTION INTRAVENOUS; SUBCUTANEOUS at 12:29

## 2021-01-12 NOTE — PROGRESS NOTES
Dr. Pam Rodriguez for Dr. Stefano Clark, notified of of patient's temp 101.7. Pt given Tylenol 650 mg po.

## 2021-01-12 NOTE — DIABETES MGMT
Patient's blood glucose much improved since steroids completed. Blood glucose ranged  yesterday with patient receiving no insulin. Blood glucose 122 this morning. Patient was on orals for his diabetes at home. Will follow very loosely.

## 2021-01-12 NOTE — PROGRESS NOTES
Hospitalist Progress Note    Patient: Varinder Hoffman MRN: [id number]  SSN: [id number]    YOB: 1945  Age: 67 y.o. Sex: male      Admit Date: 12/31/2020    LOS: 12 days     Subjective:   68 yo CM with past history of CAD s/p stents, ischemic cardiomyopathy, PAD (on Plavix and cilostazo), COPD, DM type II, HTN, HLD presents via EMS after fall at home and was admitted due to acute respiratory failure due to COVID and had an episode of VTach. Family has refused for remdesivir and convalescent plasma. 1/12: Patient is currently on CPAP 75%. Reports he is tired of his illness and sometimes thinks he is better to die. Denies nausea, vomiting, abdominal pain, chest pain or palpitation. Review of systems negative except stated above. Objective:     Visit Vitals  /79 (BP 1 Location: Right arm, BP Patient Position: At rest)   Pulse 84   Temp 99.1 °F (37.3 °C)   Resp 24   Ht 6' 1\" (1.854 m)   Wt 131.1 kg (289 lb)   SpO2 94%   BMI 38.13 kg/m²      Oxygen Therapy  O2 Sat (%): 94 % (01/12/21 1212)  Pulse via Oximetry: 83 beats per minute (01/12/21 1104)  O2 Device: CPAP mask (01/12/21 1104)  O2 Flow Rate (L/min): 60 l/min (01/11/21 1605)  O2 Temperature: 87.8 °F (31 °C) (01/11/21 1605)  FIO2 (%): 80 % (01/12/21 1104)      Intake and Output:     Intake/Output Summary (Last 24 hours) at 1/12/2021 1315  Last data filed at 1/12/2021 1214  Gross per 24 hour   Intake    Output 800 ml   Net -800 ml         Physical Exam:   GENERAL: alert, cooperative, moderate resp distress, appears stated age  EYE: conjunctivae/corneas clear. PERRL. THROAT & NECK: normal and no erythema or exudates noted. LUNG: clear to auscultation bilaterally  HEART: regular rate and rhythm, S1S2, no murmur, no JVD  ABDOMEN: soft, non-tender, non-distended. Bowel sounds normal.   EXTREMITIES:  No edema, 2+ pedal/radial pulses bilaterally  SKIN: no rash or abnormalities  NEUROLOGIC: A&Ox3.  Cranial nerves 2-12 grossly intact. Lab/Data Review:  Recent Results (from the past 24 hour(s))   GLUCOSE, POC    Collection Time: 01/11/21  4:20 PM   Result Value Ref Range    Glucose (POC) 128 (H) 65 - 100 mg/dL   GLUCOSE, POC    Collection Time: 01/11/21  9:40 PM   Result Value Ref Range    Glucose (POC) 124 (H) 65 - 100 mg/dL   GLUCOSE, POC    Collection Time: 01/12/21  8:21 AM   Result Value Ref Range    Glucose (POC) 122 (H) 65 - 542 mg/dL   METABOLIC PANEL, COMPREHENSIVE    Collection Time: 01/12/21 10:54 AM   Result Value Ref Range    Sodium 131 (L) 138 - 145 mmol/L    Potassium 4.9 3.5 - 5.1 mmol/L    Chloride 95 (L) 98 - 107 mmol/L    CO2 30 21 - 32 mmol/L    Anion gap 6 (L) 7 - 16 mmol/L    Glucose 171 (H) 65 - 100 mg/dL    BUN 21 8 - 23 MG/DL    Creatinine 0.89 0.8 - 1.5 MG/DL    GFR est AA >60 >60 ml/min/1.73m2    GFR est non-AA >60 >60 ml/min/1.73m2    Calcium 8.6 8.3 - 10.4 MG/DL    Bilirubin, total 0.9 0.2 - 1.1 MG/DL    ALT (SGPT) 53 12 - 65 U/L    AST (SGOT) 41 (H) 15 - 37 U/L    Alk. phosphatase 88 50 - 136 U/L    Protein, total 6.7 6.3 - 8.2 g/dL    Albumin 2.3 (L) 3.2 - 4.6 g/dL    Globulin 4.4 (H) 2.3 - 3.5 g/dL    A-G Ratio 0.5 (L) 1.2 - 3.5     CBC WITH AUTOMATED DIFF    Collection Time: 01/12/21 10:54 AM   Result Value Ref Range    WBC 10.8 4.3 - 11.1 K/uL    RBC 3.34 (L) 4.23 - 5.6 M/uL    HGB 10.9 (L) 13.6 - 17.2 g/dL    HCT 32.9 (L) 41.1 - 50.3 %    MCV 98.5 (H) 79.6 - 97.8 FL    MCH 32.6 26.1 - 32.9 PG    MCHC 33.1 31.4 - 35.0 g/dL    RDW 14.6 11.9 - 14.6 %    PLATELET 846 183 - 457 K/uL    MPV 10.4 9.4 - 12.3 FL    ABSOLUTE NRBC 0.00 0.0 - 0.2 K/uL    DF AUTOMATED      NEUTROPHILS 86 (H) 43 - 78 %    LYMPHOCYTES 4 (L) 13 - 44 %    MONOCYTES 9 4.0 - 12.0 %    EOSINOPHILS 0 (L) 0.5 - 7.8 %    BASOPHILS 0 0.0 - 2.0 %    IMMATURE GRANULOCYTES 1 0.0 - 5.0 %    ABS. NEUTROPHILS 9.2 (H) 1.7 - 8.2 K/UL    ABS. LYMPHOCYTES 0.5 0.5 - 4.6 K/UL    ABS. MONOCYTES 0.9 0.1 - 1.3 K/UL    ABS.  EOSINOPHILS 0.0 0.0 - 0.8 K/UL ABS. BASOPHILS 0.0 0.0 - 0.2 K/UL    ABS. IMM. GRANS. 0.1 0.0 - 0.5 K/UL   GLUCOSE, POC    Collection Time: 01/12/21 11:29 AM   Result Value Ref Range    Glucose (POC) 178 (H) 65 - 100 mg/dL       SARS-CoV-2 Lab Results  \"Novel Coronavirus\" Test: No results found for: COV2NT   \"Emergent Disease\" Test: No results found for: EDPR  \"SARS-COV-2\" Test: No results found for: XGCOVT  \"Precision Labs\" Test: No results found for: RSLT  Rapid Test:   Lab Results   Component Value Date/Time    COVR Detected (AA) 12/31/2020 05:49 AM           Imaging:  Xr Chest Port    Result Date: 12/31/2020  EXAM: Chest x-ray. INDICATION: Dyspnea. COMPARISON: Prior chest x-ray on April 9, 2019. TECHNIQUE: 2 frontal views of the chest were obtained. FINDINGS: The lungs are hyperexpanded, consistent with the reported history of COPD. There is new mild infiltrate in the right lung base. The left lung is clear. The heart is borderline enlarged. The mediastinal contour and pulmonary vasculature are normal. No pneumothorax or pleural effusion is seen. There are degenerative changes in the spine. IMPRESSION: 1. Mild right lung base infiltrate, suspect for acute pneumonia. 2. COPD. No results found for this visit on 12/31/20. Cultures:   All Micro Results     Procedure Component Value Units Date/Time    CULTURE, BLOOD [[id number]] Collected: 01/02/21 0843    Order Status: Completed Specimen: Blood Updated: 01/07/21 1045     Special Requests: --        RIGHT  Antecubital       Culture result: NO GROWTH 5 DAYS       CULTURE, BLOOD [[id number]] Collected: 01/02/21 0848    Order Status: Completed Specimen: Blood Updated: 01/07/21 1045     Special Requests: --        LEFT  Antecubital       Culture result: NO GROWTH 5 DAYS       CULTURE, BLOOD [[id number]] Collected: 12/31/20 0309    Order Status: Completed Specimen: Blood Updated: 01/05/21 1038     Special Requests: --        RIGHT  HAND       Culture result: NO GROWTH 5 DAYS       CULTURE, BLOOD [[id number]]  (Abnormal) Collected: 12/31/20 0549    Order Status: Completed Specimen: Blood Updated: 01/03/21 0736     Special Requests: --        NO SPECIAL REQUESTS  RIGHT  FOREARM       GRAM STAIN GRAM POSITIVE COCCI         AEROBIC BOTTLE POSITIVE               RESULTS VERIFIED, PHONED TO AND READ BACK BY GALLITO DSOUZA @ 0451 1/1/21 MM           Culture result:       ALPHA STREPTOCOCCUS, NOT S. PNEUMONIAE                  STAPHYLOCOCCUS SPECIES, COAGULASE NEGATIVE            REFER TO Richi Batista [id number]            THIS ORGANISM MAY BE INDICATIVE OF CULTURE CONTAMINATION, HOWEVER, CLINICAL CORRELATION NEEDS TO BE EVALUATED, AS EACH CASE IS UNIQUE. BLOOD CULTURE ID PANEL [[id number]]  (Abnormal) Collected: 12/31/20 0549    Order Status: Completed Specimen: Blood Updated: 01/01/21 0801     Acc. no. from Micro Order [id number]     Staphylococcus Detected        Comment: RESULTS VERIFIED, PHONED TO AND READ BACK BY  GALLITO DSOUZA @ 0451 1/1/21 MM          Streptococcus Detected        Comment: RESULTS VERIFIED, PHONED TO AND READ BACK BY  GALLITO DSOUZA @ 0451 1/1/21 MM          mecA (Methicillin-Resistance Genes) NOT DETECTED        INTERPRETATION       Multiple organisms detected. Results do not replace susceptibility testing.                 Assessment/Plan:       Acute respiratory failure with hypoxia secondary to COVID-19 infection:  - On CPAP, unable to wean to airvo  - High risk for deterioration requiring mechanical ventilation  - Continue Decadron  - Refused Remdesivir and convalescent plasma initially  - Continue home aerosols  - Completed antibiotics for possible CAP  - Wean oxygen as appropriate  - Pulmonology consulted, appreciate recommendations  - Continue Lasix      Monomorphic ventricular tachycardia:  - Patient found to have monomorphic VT with EMS, converted on his own  - Given Mag in ER  - Cardiology saw him in ER and recommended correcting electrolytes  - Start Amio if it happens again  - Cardiology signed off, didn't write a note?       Bacteremia due to gram positive bacteria:  - Initial blood cultures with strep + staph but both common contaminants  - Repeat cultures NGTD   - If persistent, will need ECHO      AGUSTIN:  - Resolved      Elevated troponin:  - Troponin 72 --> 425 --> 391  - No acute CP  - ?COVID related vs VT  - Repeat troponin until trending down  - Continue ASA + Plavix  - Cardiology assess in ER(?) -->  Previous attending has spoken to them and they think it's COVID      DM (diabetes mellitus) type II:  - A1C on 12/16/20 was 6.8  - Blood sugars in 300s, patient is on steroids   - Increase Lantus to 35 units   - continue Humalog SSI      Coronary artery disease:  - Stable  - Continue ASA + Plavix  - Continue Crestor      COPD:  - No acute wheezing  - Continue home aerosols      PAD:  - Continue ASA + Plavix      Hyperlipidemia:   - Continue Crestor    DIET DIABETIC CONSISTENT CARB Regular  DIET NUTRITIONAL SUPPLEMENTS All Meals; Glucernisa Johnston ( )    DVT Prophylaxis: Heparin    Discharge Plan: TBD      Signed By: Freida Taveras MD     January 12, 2021

## 2021-01-12 NOTE — PROGRESS NOTES
Patient is on CPAP today. Respiratory attempted to get him off and back on Airvo but his sats dropped into the 80's. Will hold therapy today and check again tomorrow.     Libby Larsen PTA

## 2021-01-12 NOTE — PROGRESS NOTES
Problem: Diabetes Self-Management  Goal: *Disease process and treatment process  Description: Define diabetes and identify own type of diabetes; list 3 options for treating diabetes. Outcome: Progressing Towards Goal  Goal: *Incorporating nutritional management into lifestyle  Description: Describe effect of type, amount and timing of food on blood glucose; list 3 methods for planning meals. Outcome: Progressing Towards Goal  Goal: *Incorporating physical activity into lifestyle  Description: State effect of exercise on blood glucose levels. Outcome: Progressing Towards Goal  Goal: *Developing strategies to promote health/change behavior  Description: Define the ABC's of diabetes; identify appropriate screenings, schedule and personal plan for screenings. Outcome: Progressing Towards Goal  Goal: *Using medications safely  Description: State effect of diabetes medications on diabetes; name diabetes medication taking, action and side effects. Outcome: Progressing Towards Goal  Goal: *Monitoring blood glucose, interpreting and using results  Description: Identify recommended blood glucose targets  and personal targets. Outcome: Progressing Towards Goal  Goal: *Prevention, detection, treatment of acute complications  Description: List symptoms of hyper- and hypoglycemia; describe how to treat low blood sugar and actions for lowering  high blood glucose level. Outcome: Progressing Towards Goal  Goal: *Prevention, detection and treatment of chronic complications  Description: Define the natural course of diabetes and describe the relationship of blood glucose levels to long term complications of diabetes.   Outcome: Progressing Towards Goal  Goal: *Developing strategies to address psychosocial issues  Description: Describe feelings about living with diabetes; identify support needed and support network  Outcome: Progressing Towards Goal  Goal: *Insulin pump training  Outcome: Progressing Towards Goal  Goal: *Sick day guidelines  Outcome: Progressing Towards Goal  Goal: *Patient Specific Goal (EDIT GOAL, INSERT TEXT)  Outcome: Progressing Towards Goal     Problem: Patient Education: Go to Patient Education Activity  Goal: Patient/Family Education  Outcome: Progressing Towards Goal     Problem: Falls - Risk of  Goal: *Absence of Falls  Description: Document Brooks Cuevas Fall Risk and appropriate interventions in the flowsheet. Outcome: Progressing Towards Goal  Note: Fall Risk Interventions:  Mobility Interventions: Communicate number of staff needed for ambulation/transfer, Patient to call before getting OOB         Medication Interventions: Patient to call before getting OOB, Teach patient to arise slowly    Elimination Interventions: Call light in reach, Patient to call for help with toileting needs    History of Falls Interventions: Investigate reason for fall         Problem: Patient Education: Go to Patient Education Activity  Goal: Patient/Family Education  Outcome: Progressing Towards Goal     Problem: Pressure Injury - Risk of  Goal: *Prevention of pressure injury  Description: Document Hemant Scale and appropriate interventions in the flowsheet.   Outcome: Progressing Towards Goal  Note: Pressure Injury Interventions:  Sensory Interventions: Assess changes in LOC    Moisture Interventions: Absorbent underpads, Limit adult briefs    Activity Interventions: Increase time out of bed, Pressure redistribution bed/mattress(bed type), PT/OT evaluation    Mobility Interventions: Pressure redistribution bed/mattress (bed type), PT/OT evaluation    Nutrition Interventions: Offer support with meals,snacks and hydration    Friction and Shear Interventions: Apply protective barrier, creams and emollients, Lift sheet                Problem: Patient Education: Go to Patient Education Activity  Goal: Patient/Family Education  Outcome: Progressing Towards Goal     Problem: Patient Education: Go to Patient Education Activity  Goal: Patient/Family Education  Outcome: Progressing Towards Goal

## 2021-01-12 NOTE — CONSULTS
[facility]awa [facility] Critical Care COVID-19 Note:    Critical Care Progress Note: 1/12/2021    Kajal Gay  Admission Date: 12/31/2020     Length of Stay: 12 days    Background: 67 y.o. y/o male with acute hypoxemic respiratory failure secondary to COVID-19. Date of COVID-19 symptom onset:    Notable PMH: 66 yo CM with past history of CAD s/p stents, ischemic cardiomyopathy, PAD (on Plavix and cilostazo), COPD, DM type II, HTN, HLD presents via EMS after fall at home and was admitted due to acute respiratory failure due to COVID and had an episode of VTach @ 250bpm.  Covid screen came back positive on 12/31. Family has refused for remdesivir and convalescent plasma. Patient has been managed on the floor now with escalating O2 requirement. On CPAP 8-10 with %       No acute events overnight. Drug Allergies: Allergies   Allergen Reactions    Daypro [Oxaprozin] Other (comments)     ELEVATED BLOOD PRESSURE           REVIEW OF SYSTEMS:  Constitutional:  denies fever, No chills, night sweats, weight loss, weight gain  Eyes:  Denies problems with eye pain, erythema, blurred vision, or visual field loss. ENTM:  Denies sore throat, deniesloss of taste or smell  Lymph:  Denies swollen glands. Cardiac:  No chest pain, pressure, discomfort, palpitations, orthopnea, murmurs, or edema. GI:  No dysphagia, heartburn reflux, nausea/vomiting, diarrhea, abdominal pain, or bleeding. :Denies history of dysuria, hematuria, polyuria, or decreased urine output. MS:  No history of myalgias, some LBP, no muscle cramps. Skin:  No history of rashes, jaundice, cyanosis, nodules, or ulcers. Endo:  Negative for heat or cold intolerance. No polyuria/polydipsia  Psych:  Mild anxiety, No depression, insomnia, hallucinations. Neuro:  Denies AMS, persistent headache, decreased level of consciousness,or motor or sensory deficits.     Visit Vitals  /79 (BP 1 Location: Right arm, BP Patient Position: At rest) Pulse 84   Temp 37.3 °C (99.1 °F)   Resp 24   Ht 6' 1\" (1.854 m)   Wt 131.1 kg (289 lb)   SpO2 94%   BMI 38.13 kg/m²       I/O:      Intake/Output Summary (Last 24 hours) at 1/12/2021 1214  Last data filed at 1/12/2021 0844  Gross per 24 hour   Intake    Output 550 ml   Net -550 ml        Pertinent Exam:            Constitutional: awake, alert, increased work of breathing. EENMT:  Sclera clear, pupils equal, oral mucosa moist  Respiratory: crackles bilateral, rhonchi R>L some tightness AND WHEEZING   Cardiovascular:  RRR with no M,G,R;  Gastrointestinal:  soft with no tenderness; positive bowel sounds present  Musculoskeletal:  warm with no cyanosis, +1 lower extremity edema  Skin:  no jaundice or ecchymosis  Neurologic: no gross neuro deficits     Psychiatric: no hallucinations, prefer to think positively, does not like talk of doom and gloom    CXR:    Lines (insertion date): PIVs    Drips:    COVID-19 Meds:  Dexamethasone 6mg daily (12/31- 1/9)  Remdesivir patient/family refused? Convalescent plasma not used. SARS-CoV-2 LAB Results    Microbiology Results  Date  Source Culture Result          Anti-infectives (start date):   Rocephin given 12/31 and 1/1  Doxycycline 12/31 - 1/4  No abx currently.     Pertinent Labs:   Recent Labs     01/12/21  1054 01/11/21  1107 01/10/21  0719   WBC 10.8 7.4 6.6   HGB 10.9* 10.7* 11.2*   HCT 32.9* 32.2* 32.6*    218 184     Recent Labs     01/12/21  1054 01/11/21  1107 01/10/21  0719   * 135* 135*   K 4.9 4.9 4.9   CL 95* 98 100   CO2 30 32 33*   * 110* 113*   BUN 21 21 23   CREA 0.89 0.81 0.84   CA 8.6 8.8 8.6   ALB 2.3* 2.5* 2.5*   TBILI 0.9 0.8 0.6   ALT 53 61 63     Recent Labs     01/12/21  1129 01/12/21  0821 01/11/21  2140   GLUCPOC 178* 122* 124*       Oxygen Requirements:  Date O2 support mode FiO2 SaO2           IMPRESSION:  Active Hospital Problems    Diagnosis Date Noted    CAP (community acquired pneumonia) 12/31/2020    Monomorphic ventricular tachycardia ([facility] 12/31/2020    COVID-19 12/31/2020    Elevated troponin 12/31/2020    Hyperlipidemia 11/10/2015    PAD (peripheral artery disease) ([facility] 11/10/2015    COPD (chronic obstructive pulmonary disease) ([facility] 04/10/2015    Acute respiratory failure with hypoxia ([facility] 10/23/2014    AGUSTIN (acute kidney injury) ([facility] 09/15/2014    DM (diabetes mellitus) type II, controlled, with peripheral vascular disorder ([facility] 09/11/2014    Morbid obesity ([facility] 10/12/2011    Coronary artery disease involving native coronary artery of native heart without angina pectoris 10/12/2011       67 y.o. y/o male with acute hypoxemic respiratory failure secondary to COVID-19. PLAN:  1. Acute respiratory failure with hypoxia: supportive measures with noninvasive oxygen strategy unless tiring or desaturation. Self-proning encouraged. Lasix for diuresis (20mg IV BID ordered) but only started 1/12.  - OOB to chair, discussed need for changes in position with RN.   - PT consult  2. COVID-19 pneumonia:  Completed decadron course  3. Nutrition: PO per medicine. 4. PPx:  DVT ppx ongoing. (on plavix 2/2 CAD) will recheck dimer, may consider adding heparin/lovenox. 5. Does not meet need for ICU/Intubation at current on 75% CPAP/10Peep, however if he continues to decline and requires 100% Bipap with sats< 93% he will require new attention for potential intubation. Will continue to follow    Full Code       The patient is critically ill with respiratory failure and requires high complexity decision making for assessment and support including frequent evaluation and titration of therapies, application of advanced monitoring technologies & timing & modality of advanced respiratory support.   Cumulative time devoted to patient care services, counseling and coordination of care  by me for day of service -27 min        Marthena Burkitt, MD

## 2021-01-13 LAB
ALBUMIN SERPL-MCNC: 2.1 G/DL (ref 3.2–4.6)
ALBUMIN/GLOB SERPL: 0.5 {RATIO} (ref 1.2–3.5)
ALP SERPL-CCNC: 84 U/L (ref 50–136)
ALT SERPL-CCNC: 46 U/L (ref 12–65)
ANION GAP SERPL CALC-SCNC: 6 MMOL/L (ref 7–16)
AST SERPL-CCNC: 37 U/L (ref 15–37)
BASOPHILS # BLD: 0 K/UL (ref 0–0.2)
BASOPHILS NFR BLD: 0 % (ref 0–2)
BILIRUB SERPL-MCNC: 0.7 MG/DL (ref 0.2–1.1)
BUN SERPL-MCNC: 26 MG/DL (ref 8–23)
CALCIUM SERPL-MCNC: 8.6 MG/DL (ref 8.3–10.4)
CHLORIDE SERPL-SCNC: 95 MMOL/L (ref 98–107)
CO2 SERPL-SCNC: 31 MMOL/L (ref 21–32)
CREAT SERPL-MCNC: 0.99 MG/DL (ref 0.8–1.5)
D DIMER PPP FEU-MCNC: 7.64 UG/ML(FEU)
DIFFERENTIAL METHOD BLD: ABNORMAL
EOSINOPHIL # BLD: 0 K/UL (ref 0–0.8)
EOSINOPHIL NFR BLD: 0 % (ref 0.5–7.8)
ERYTHROCYTE [DISTWIDTH] IN BLOOD BY AUTOMATED COUNT: 14.5 % (ref 11.9–14.6)
GLOBULIN SER CALC-MCNC: 4.4 G/DL (ref 2.3–3.5)
GLUCOSE BLD STRIP.AUTO-MCNC: 145 MG/DL (ref 65–100)
GLUCOSE BLD STRIP.AUTO-MCNC: 187 MG/DL (ref 65–100)
GLUCOSE BLD STRIP.AUTO-MCNC: 193 MG/DL (ref 65–100)
GLUCOSE BLD STRIP.AUTO-MCNC: 197 MG/DL (ref 65–100)
GLUCOSE SERPL-MCNC: 145 MG/DL (ref 65–100)
HCT VFR BLD AUTO: 29.7 % (ref 41.1–50.3)
HGB BLD-MCNC: 9.8 G/DL (ref 13.6–17.2)
IMM GRANULOCYTES # BLD AUTO: 0.2 K/UL (ref 0–0.5)
IMM GRANULOCYTES NFR BLD AUTO: 2 % (ref 0–5)
LYMPHOCYTES # BLD: 0.5 K/UL (ref 0.5–4.6)
LYMPHOCYTES NFR BLD: 4 % (ref 13–44)
MCH RBC QN AUTO: 32.1 PG (ref 26.1–32.9)
MCHC RBC AUTO-ENTMCNC: 33 G/DL (ref 31.4–35)
MCV RBC AUTO: 97.4 FL (ref 79.6–97.8)
MONOCYTES # BLD: 0.9 K/UL (ref 0.1–1.3)
MONOCYTES NFR BLD: 8 % (ref 4–12)
NEUTS SEG # BLD: 10.7 K/UL (ref 1.7–8.2)
NEUTS SEG NFR BLD: 87 % (ref 43–78)
NRBC # BLD: 0 K/UL (ref 0–0.2)
PLATELET # BLD AUTO: 249 K/UL (ref 150–450)
PMV BLD AUTO: 11.1 FL (ref 9.4–12.3)
POTASSIUM SERPL-SCNC: 4.7 MMOL/L (ref 3.5–5.1)
PROT SERPL-MCNC: 6.5 G/DL (ref 6.3–8.2)
RBC # BLD AUTO: 3.05 M/UL (ref 4.23–5.6)
SODIUM SERPL-SCNC: 132 MMOL/L (ref 138–145)
WBC # BLD AUTO: 12.3 K/UL (ref 4.3–11.1)

## 2021-01-13 PROCEDURE — 94640 AIRWAY INHALATION TREATMENT: CPT

## 2021-01-13 PROCEDURE — 74011250637 HC RX REV CODE- 250/637: Performed by: INTERNAL MEDICINE

## 2021-01-13 PROCEDURE — 74011250636 HC RX REV CODE- 250/636: Performed by: INTERNAL MEDICINE

## 2021-01-13 PROCEDURE — 65270000029 HC RM PRIVATE

## 2021-01-13 PROCEDURE — 97110 THERAPEUTIC EXERCISES: CPT

## 2021-01-13 PROCEDURE — 36415 COLL VENOUS BLD VENIPUNCTURE: CPT

## 2021-01-13 PROCEDURE — 74011250636 HC RX REV CODE- 250/636: Performed by: ANESTHESIOLOGY

## 2021-01-13 PROCEDURE — 80053 COMPREHEN METABOLIC PANEL: CPT

## 2021-01-13 PROCEDURE — 85025 COMPLETE CBC W/AUTO DIFF WBC: CPT

## 2021-01-13 PROCEDURE — 94660 CPAP INITIATION&MGMT: CPT

## 2021-01-13 PROCEDURE — 85379 FIBRIN DEGRADATION QUANT: CPT

## 2021-01-13 PROCEDURE — 82962 GLUCOSE BLOOD TEST: CPT

## 2021-01-13 PROCEDURE — 94762 N-INVAS EAR/PLS OXIMTRY CONT: CPT

## 2021-01-13 RX ADMIN — ALBUTEROL SULFATE 2 PUFF: 108 INHALANT RESPIRATORY (INHALATION) at 20:51

## 2021-01-13 RX ADMIN — Medication 10 ML: at 05:18

## 2021-01-13 RX ADMIN — FUROSEMIDE 20 MG: 10 INJECTION INTRAMUSCULAR; INTRAVENOUS at 08:41

## 2021-01-13 RX ADMIN — FUROSEMIDE 20 MG: 10 INJECTION INTRAMUSCULAR; INTRAVENOUS at 17:08

## 2021-01-13 RX ADMIN — HEPARIN SODIUM 5000 UNITS: 5000 INJECTION INTRAVENOUS; SUBCUTANEOUS at 17:09

## 2021-01-13 RX ADMIN — BUDESONIDE AND FORMOTEROL FUMARATE DIHYDRATE 2 PUFF: 160; 4.5 AEROSOL RESPIRATORY (INHALATION) at 09:44

## 2021-01-13 RX ADMIN — ALBUTEROL SULFATE 2 PUFF: 108 INHALANT RESPIRATORY (INHALATION) at 15:50

## 2021-01-13 RX ADMIN — Medication 10 ML: at 13:11

## 2021-01-13 RX ADMIN — HEPARIN SODIUM 5000 UNITS: 5000 INJECTION INTRAVENOUS; SUBCUTANEOUS at 01:01

## 2021-01-13 RX ADMIN — HEPARIN SODIUM 5000 UNITS: 5000 INJECTION INTRAVENOUS; SUBCUTANEOUS at 08:42

## 2021-01-13 RX ADMIN — ALBUTEROL SULFATE 2 PUFF: 108 INHALANT RESPIRATORY (INHALATION) at 09:44

## 2021-01-13 RX ADMIN — Medication 10 ML: at 22:00

## 2021-01-13 NOTE — PROGRESS NOTES
Hospitalist Progress Note    Patient: Fani Pham MRN: [id number]  SSN: [id number]    YOB: 1945  Age: 67 y.o. Sex: male      Admit Date: 12/31/2020    LOS: 13 days     Subjective:   68 yo CM with past history of CAD s/p stents, ischemic cardiomyopathy, PAD (on Plavix and cilostazo), COPD, DM type II, HTN, HLD presents via EMS after fall at home and was admitted due to acute respiratory failure due to COVID and had an episode of VTach. Family has refused for remdesivir and convalescent plasma. Patient oxygen requirement has been worsening. Initially he was on 40 to 60 L airvo BiPAP at night. Now for the past 2 days patient is on CPAP 75 - 100%. Unable to wean him to the OptiFlow. Pulmonology consulted and following. 1/13: Patient is currently on CPAP 100%. Patient is feeling tired. He does not want to speak today, just giving me answer by nodding his head. He states he is hungry. I spoke to the wife today. Updated her on patient's current condition. She is aware patient is critically sick and may deteriorate requiring intubation. Wife is concerned about patient not able to eat due to desats and asking if we can feed him by feeding tube or IV. Also concern about his hygiene. Review of systems negative except stated above.     Objective:     Visit Vitals  /77   Pulse 81   Temp 98.2 °F (36.8 °C)   Resp 24   Ht 6' 1\" (1.854 m)   Wt 131.1 kg (289 lb)   SpO2 94%   BMI 38.13 kg/m²      Oxygen Therapy  O2 Sat (%): 94 % (01/13/21 0944)  Pulse via Oximetry: 82 beats per minute (01/13/21 0944)  O2 Device: CPAP mask (01/13/21 0944)  O2 Flow Rate (L/min): 60 l/min (01/11/21 1605)  O2 Temperature: 87.8 °F (31 °C) (01/11/21 1605)  FIO2 (%): 100 % (01/13/21 0944)      Intake and Output:     Intake/Output Summary (Last 24 hours) at 1/13/2021 1340  Last data filed at 1/13/2021 1310  Gross per 24 hour   Intake    Output 950 ml   Net -950 ml         Physical Exam:   GENERAL: alert, cooperative, moderate resp distress, appears stated age  EYE: conjunctivae/corneas clear. PERRL. THROAT & NECK: normal and no erythema or exudates noted. LUNG: Breath sounds bilateral, bibasilar crackles  HEART: regular rate and rhythm, S1S2, no murmur, no JVD  ABDOMEN: soft, non-tender, non-distended. Bowel sounds normal.   EXTREMITIES:  No edema, 2+ pedal/radial pulses bilaterally  SKIN: no rash or abnormalities  NEUROLOGIC: A&Ox3. Cranial nerves 2-12 grossly intact. Lab/Data Review:  Recent Results (from the past 24 hour(s))   GLUCOSE, POC    Collection Time: 01/12/21  4:21 PM   Result Value Ref Range    Glucose (POC) 143 (H) 65 - 100 mg/dL   GLUCOSE, POC    Collection Time: 01/12/21  7:33 PM   Result Value Ref Range    Glucose (POC) 167 (H) 65 - 156 mg/dL   METABOLIC PANEL, COMPREHENSIVE    Collection Time: 01/13/21  4:44 AM   Result Value Ref Range    Sodium 132 (L) 138 - 145 mmol/L    Potassium 4.7 3.5 - 5.1 mmol/L    Chloride 95 (L) 98 - 107 mmol/L    CO2 31 21 - 32 mmol/L    Anion gap 6 (L) 7 - 16 mmol/L    Glucose 145 (H) 65 - 100 mg/dL    BUN 26 (H) 8 - 23 MG/DL    Creatinine 0.99 0.8 - 1.5 MG/DL    GFR est AA >60 >60 ml/min/1.73m2    GFR est non-AA >60 >60 ml/min/1.73m2    Calcium 8.6 8.3 - 10.4 MG/DL    Bilirubin, total 0.7 0.2 - 1.1 MG/DL    ALT (SGPT) 46 12 - 65 U/L    AST (SGOT) 37 15 - 37 U/L    Alk.  phosphatase 84 50 - 136 U/L    Protein, total 6.5 6.3 - 8.2 g/dL    Albumin 2.1 (L) 3.2 - 4.6 g/dL    Globulin 4.4 (H) 2.3 - 3.5 g/dL    A-G Ratio 0.5 (L) 1.2 - 3.5     CBC WITH AUTOMATED DIFF    Collection Time: 01/13/21  4:44 AM   Result Value Ref Range    WBC 12.3 (H) 4.3 - 11.1 K/uL    RBC 3.05 (L) 4.23 - 5.6 M/uL    HGB 9.8 (L) 13.6 - 17.2 g/dL    HCT 29.7 (L) 41.1 - 50.3 %    MCV 97.4 79.6 - 97.8 FL    MCH 32.1 26.1 - 32.9 PG    MCHC 33.0 31.4 - 35.0 g/dL    RDW 14.5 11.9 - 14.6 %    PLATELET 163 584 - 134 K/uL    MPV 11.1 9.4 - 12.3 FL    ABSOLUTE NRBC 0.00 0.0 - 0.2 K/uL    DF AUTOMATED NEUTROPHILS 87 (H) 43 - 78 %    LYMPHOCYTES 4 (L) 13 - 44 %    MONOCYTES 8 4.0 - 12.0 %    EOSINOPHILS 0 (L) 0.5 - 7.8 %    BASOPHILS 0 0.0 - 2.0 %    IMMATURE GRANULOCYTES 2 0.0 - 5.0 %    ABS. NEUTROPHILS 10.7 (H) 1.7 - 8.2 K/UL    ABS. LYMPHOCYTES 0.5 0.5 - 4.6 K/UL    ABS. MONOCYTES 0.9 0.1 - 1.3 K/UL    ABS. EOSINOPHILS 0.0 0.0 - 0.8 K/UL    ABS. BASOPHILS 0.0 0.0 - 0.2 K/UL    ABS. IMM. GRANS. 0.2 0.0 - 0.5 K/UL   D DIMER    Collection Time: 01/13/21  6:51 AM   Result Value Ref Range    D DIMER 7.64 (H) <0.56 ug/ml(FEU)   GLUCOSE, POC    Collection Time: 01/13/21  6:57 AM   Result Value Ref Range    Glucose (POC) 145 (H) 65 - 100 mg/dL   GLUCOSE, POC    Collection Time: 01/13/21 12:08 PM   Result Value Ref Range    Glucose (POC) 187 (H) 65 - 100 mg/dL       SARS-CoV-2 Lab Results  \"Novel Coronavirus\" Test: No results found for: COV2NT   \"Emergent Disease\" Test: No results found for: EDPR  \"SARS-COV-2\" Test: No results found for: XGCOVT  \"Precision Labs\" Test: No results found for: RSLT  Rapid Test:   Lab Results   Component Value Date/Time    COVR Detected (AA) 12/31/2020 05:49 AM           Imaging:  Xr Chest Port    Result Date: 12/31/2020  EXAM: Chest x-ray. INDICATION: Dyspnea. COMPARISON: Prior chest x-ray on April 9, 2019. TECHNIQUE: 2 frontal views of the chest were obtained. FINDINGS: The lungs are hyperexpanded, consistent with the reported history of COPD. There is new mild infiltrate in the right lung base. The left lung is clear. The heart is borderline enlarged. The mediastinal contour and pulmonary vasculature are normal. No pneumothorax or pleural effusion is seen. There are degenerative changes in the spine. IMPRESSION: 1. Mild right lung base infiltrate, suspect for acute pneumonia. 2. COPD. No results found for this visit on 12/31/20. Cultures:   All Micro Results     Procedure Component Value Units Date/Time    CULTURE, BLOOD [[id number]] Collected: 01/02/21 0843    Order Status: Completed Specimen: Blood Updated: 01/07/21 1045     Special Requests: --        RIGHT  Antecubital       Culture result: NO GROWTH 5 DAYS       CULTURE, BLOOD [[id number]] Collected: 01/02/21 0848    Order Status: Completed Specimen: Blood Updated: 01/07/21 1045     Special Requests: --        LEFT  Antecubital       Culture result: NO GROWTH 5 DAYS       CULTURE, BLOOD [[id number]] Collected: 12/31/20 0309    Order Status: Completed Specimen: Blood Updated: 01/05/21 1038     Special Requests: --        RIGHT  HAND       Culture result: NO GROWTH 5 DAYS       CULTURE, BLOOD [[id number]]  (Abnormal) Collected: 12/31/20 0549    Order Status: Completed Specimen: Blood Updated: 01/03/21 0736     Special Requests: --        NO SPECIAL REQUESTS  RIGHT  FOREARM       GRAM STAIN GRAM POSITIVE COCCI         AEROBIC BOTTLE POSITIVE               RESULTS VERIFIED, PHONED TO AND READ BACK BY GALLITO DSOUZA @ 0451 1/1/21 MM           Culture result:       ALPHA STREPTOCOCCUS, NOT S. PNEUMONIAE                  STAPHYLOCOCCUS SPECIES, COAGULASE NEGATIVE            REFER TO Mariluz Colbert [id number]            THIS ORGANISM MAY BE INDICATIVE OF CULTURE CONTAMINATION, HOWEVER, CLINICAL CORRELATION NEEDS TO BE EVALUATED, AS EACH CASE IS UNIQUE. BLOOD CULTURE ID PANEL [[id number]]  (Abnormal) Collected: 12/31/20 0549    Order Status: Completed Specimen: Blood Updated: 01/01/21 0801     Acc. no. from Micro Order [id number]     Staphylococcus Detected        Comment: RESULTS VERIFIED, PHONED TO AND READ BACK BY  GALLITO DSOUZA @ 0451 1/1/21 MM          Streptococcus Detected        Comment: RESULTS VERIFIED, PHONED TO AND READ BACK BY  GALLITO DSOUZA @ 0451 1/1/21 MM          mecA (Methicillin-Resistance Genes) NOT DETECTED        INTERPRETATION       Multiple organisms detected. Results do not replace susceptibility testing.                 Assessment/Plan:       Acute respiratory failure with hypoxia secondary to COVID-19 infection:  - On CPAP, unable to wean to airvo  - High risk for deterioration requiring mechanical ventilation  - Continue Decadron  - Refused Remdesivir and convalescent plasma initially  - Continue home aerosols  - Completed antibiotics for possible CAP  - Wean oxygen as appropriate  - Pulmonology consulted, appreciate recommendations  - Continue Lasix  - Nutrition consult       Monomorphic ventricular tachycardia:  - Patient found to have monomorphic VT with EMS, converted on his own  - Given Mag in ER  - Cardiology saw him in ER and recommended correcting electrolytes  - Start Amio if it happens again  - Cardiology signed off, didn't write a note?       Bacteremia due to gram positive bacteria:  - Initial blood cultures with strep + staph but both common contaminants  - Repeat cultures NGTD   - If persistent, will need ECHO      AGUSTIN:  - Resolved      Elevated troponin:  - Troponin 72 --> 425 --> 391  - No acute CP  - ?COVID related vs VT  - Repeat troponin until trending down  - Continue ASA + Plavix  - Cardiology assess in ER(?) -->  Previous attending has spoken to them and they think it's COVID      DM (diabetes mellitus) type II:  - A1C on 12/16/20 was 6.8  - Blood sugars in 300s, patient is on steroids   - Increase Lantus to 35 units   - continue Humalog SSI      Coronary artery disease:  - Stable  - Continue ASA + Plavix  - Continue Crestor      COPD:  - No acute wheezing  - Continue home aerosols      PAD:  - Continue ASA + Plavix      Hyperlipidemia:   - Continue Crestor    DIET DIABETIC CONSISTENT CARB Regular  DIET NUTRITIONAL SUPPLEMENTS All Meals; Luzmaria Johnston ( )    DVT Prophylaxis: Heparin    Discharge Plan: TBD      Signed By: Bridget Carlisle MD     January 13, 2021

## 2021-01-13 NOTE — CONSULTS
[facility] Critical Care COVID-19 Note:    Critical Care Progress Note: 1/13/2021    Kg Long  Admission Date: 12/31/2020     Length of Stay: 13 days    Background: 67 y.o. y/o male with acute hypoxemic respiratory failure secondary to COVID-19. Date of COVID-19 symptom onset:    Notable PMH: 68 yo CM with past history of CAD s/p stents, ischemic cardiomyopathy, PAD (on Plavix and cilostazo), COPD, DM type II, HTN, HLD presents via EMS after fall at home and was admitted due to acute respiratory failure due to COVID and had an episode of VTach @ 250bpm.  Covid screen came back positive on 12/31. Family has refused for remdesivir and convalescent plasma. Patient has been managed on the floor now with escalating O2 requirement. On CPAP 8-10 with %       No acute events overnight. Found on 100% this morning satting 98, got him OOB to chair, and turned down to 75%      Drug Allergies: Allergies   Allergen Reactions    Daypro [Oxaprozin] Other (comments)     ELEVATED BLOOD PRESSURE           REVIEW OF SYSTEMS:  Constitutional:  denies fever, No chills, night sweats, weight loss, weight gain  Eyes:  Denies problems with eye pain, erythema, blurred vision, or visual field loss. ENTM:  Denies sore throat, deniesloss of taste or smell  Lymph:  Denies swollen glands. Cardiac:  No chest pain, pressure, discomfort, palpitations, orthopnea, murmurs, or edema. GI:  No dysphagia, heartburn reflux, nausea/vomiting, diarrhea, abdominal pain, or bleeding. :Denies history of dysuria, hematuria, polyuria, or decreased urine output. MS:  No history of myalgias, some LBP, no muscle cramps. Skin:  No history of rashes, jaundice, cyanosis, nodules, or ulcers. Endo:  Negative for heat or cold intolerance. No polyuria/polydipsia  Psych:  Mild anxiety, No depression, insomnia, hallucinations.   Neuro:  Denies AMS, persistent headache, decreased level of consciousness,or motor or sensory deficits. Visit Vitals  /77   Pulse 81   Temp 36.8 °C (98.2 °F)   Resp 24   Ht 6' 1\" (1.854 m)   Wt 131.1 kg (289 lb)   SpO2 94%   BMI 38.13 kg/m²       I/O:      Intake/Output Summary (Last 24 hours) at 1/13/2021 1145  Last data filed at 1/13/2021 1101  Gross per 24 hour   Intake    Output 1000 ml   Net -1000 ml        Pertinent Exam:            Constitutional: awake, alert, increased work of breathing. EENMT:  Sclera clear, pupils equal, oral mucosa moist  Respiratory: crackles bilateral, rhonchi R>L some tightness AND WHEEZING   Cardiovascular:  RRR with no M,G,R;  Gastrointestinal:  soft with no tenderness; positive bowel sounds present  Musculoskeletal:  warm with no cyanosis, +1 lower extremity edema  Skin:  no jaundice or ecchymosis  Neurologic: no gross neuro deficits     Psychiatric: no hallucinations, prefer to think positively, does not like talk of doom and gloom    CXR:    Lines (insertion date): PIVs    Drips:    COVID-19 Meds:  Dexamethasone 6mg daily (12/31- 1/9)  Remdesivir patient/family refused? Convalescent plasma not used. SARS-CoV-2 LAB Results    Microbiology Results  Date  Source Culture Result          Anti-infectives (start date):   Rocephin given 12/31 and 1/1  Doxycycline 12/31 - 1/4  No abx currently.     Pertinent Labs:   Recent Labs     01/13/21  0444 01/12/21  1054 01/11/21  1107   WBC 12.3* 10.8 7.4   HGB 9.8* 10.9* 10.7*   HCT 29.7* 32.9* 32.2*    230 218     Recent Labs     01/13/21  0444 01/12/21  1054 01/11/21  1107   * 131* 135*   K 4.7 4.9 4.9   CL 95* 95* 98   CO2 31 30 32   * 171* 110*   BUN 26* 21 21   CREA 0.99 0.89 0.81   CA 8.6 8.6 8.8   ALB 2.1* 2.3* 2.5*   TBILI 0.7 0.9 0.8   ALT 46 53 61     Recent Labs     01/13/21  0657 01/12/21  1933 01/12/21  1621   GLUCPOC 145* 167* 143*       Oxygen Requirements:  Date O2 support mode FiO2 SaO2           IMPRESSION:  Active Hospital Problems    Diagnosis Date Noted    CAP (community acquired pneumonia) 12/31/2020    Monomorphic ventricular tachycardia ([facility] 12/31/2020    COVID-19 12/31/2020    Elevated troponin 12/31/2020    Hyperlipidemia 11/10/2015    PAD (peripheral artery disease) ([facility] 11/10/2015    COPD (chronic obstructive pulmonary disease) ([facility] 04/10/2015    Acute respiratory failure with hypoxia ([facility] 10/23/2014    AGUSTIN (acute kidney injury) ([facility] 09/15/2014    DM (diabetes mellitus) type II, controlled, with peripheral vascular disorder ([facility] 09/11/2014    Morbid obesity ([facility] 10/12/2011    Coronary artery disease involving native coronary artery of native heart without angina pectoris 10/12/2011       67 y.o. y/o male with acute hypoxemic respiratory failure secondary to COVID-19. PLAN:  1. Acute respiratory failure with hypoxia: supportive measures with noninvasive oxygen strategy unless tiring or desaturation. Self-proning encouraged. Lasix for diuresis (20mg IV BID ordered) but only started 1/12.  - OOB to chair, discussed need for changes in position with RN.   - PT consult  2. COVID-19 pneumonia:  Completed decadron course  3. Nutrition: PO per medicine. 4. PPx:  DVT ppx ongoing. (on plavix 2/2 CAD) will recheck dimer, may consider adding heparin/lovenox. 5. Does not meet need for ICU/Intubation at current on 75% CPAP/10Peep, however if he continues to decline and requires 100% Bipap with sats< 93% he will require new attention for potential intubation. Will continue to follow    Full Code       The patient is critically ill with respiratory failure and requires high complexity decision making for assessment and support including frequent evaluation and titration of therapies, application of advanced monitoring technologies & timing & modality of advanced respiratory support.   Cumulative time devoted to patient care services, counseling and coordination of care  by me for day of service -27 min        Allyson Goodman MD

## 2021-01-13 NOTE — PROGRESS NOTES
Pt wife, Trupti Tipton [phone] notified of change in pt location. Wife verbalized understanding.

## 2021-01-13 NOTE — PROGRESS NOTES
Bedside report received from [facility]. Pt resting quietly in bed with eyes closed, respirations even and unlabored. Supplemental 02 provided via Bipap. Continuous pulse oximetry and telemetry in place per MD order. No visual signs of pain or distress. Safety measures in place, call light within reach, bed alarm on. Will continue to monitor.

## 2021-01-13 NOTE — PROGRESS NOTES
Chart reviewed by [facility] for discharge planning. 02 demand CPap @ 100% Fi02   PT is attempting to work with patient but patient is not tolerating activity related to high 02 demands.   Will continue to follow for discharge planning needs  Please consult  if any new issues arise  Discharge plan is LTACH when 02 demands decreases

## 2021-01-13 NOTE — PROGRESS NOTES
Shift assessment complete, see flow sheets for full assessment details. Pt alert and oriented x4. Respirations even and unlabored on supplemental 02 via Bipap. Oxygen saturation 96%. Continuous pulse oximetry in place per MD order. Lung sounds coarse with wheezing heard on auscultation. HR regular, tele also in place per MD order. Abdomen obese, semi-soft, with active bowel sounds heard in all four quadrants. Skin intact, excoriated at groin. Pt voiding without difficulty, urine dark sanya. IV patent and capped. Pt complains of hunger, attempts to eat breakfast. Pt instructed to keep oxygen mask on due to high 02 demand. Pt educated on risk of oxygen desaturation if he were to take oxygen off. This RN stressed the importance of maintaining respiratory status. Pt verbalized understanding. Pt denies pain. All needs met at this time. Safety measures in place, call light within reach, bed alarm on. Will continue to monitor.

## 2021-01-13 NOTE — PROGRESS NOTES
ACUTE PHYSICAL THERAPY GOALS:  (Developed with and agreed upon by patient and/or caregiver.)  STG:  (1.)Mr. Jc Baca will move from supine to sit and sit to supine , scoot up and down and roll side to side with CONTACT GUARD ASSIST within 3 treatment day(s). (2.)Mr. Jc Baca will transfer from bed to chair and chair to bed with CONTACT GUARD ASSIST using the least restrictive device within 3 treatment day(s). (3.)Mr. Jc Baca will ambulate with CONTACT GUARD ASSIST for 40 feet with the least restrictive device within 3 treatment day(s). (4.)Mr. Jc Baca will perform standing static and dynamic balance activities x 10 minutes with CONTACT GUARD ASSIST to improve safety within 3 treatment day(s). (5.)Mr. Jc Baca will maintain stable vital signs throughout all functional mobility within 3 treatment days.     LTG:  (1.)Mr. Jc Baca will move from supine to sit and sit to supine , scoot up and down and roll side to side in bed with SUPERVISION within 7 treatment day(s). (2.)Mr. Jc Baca will transfer from bed to chair and chair to bed with SUPERVISION using the least restrictive device within 7 treatment day(s). (3.)Mr. Jc Baca will ambulate with SUPERVISION for 100+ feet with the least restrictive device within 7 treatment day(s). (4.)Mr. Jc Baca will perform standing static and dynamic balance activities x 15 minutes with SUPERVISION to improve safety within 7 treatment day(s). (5.)Mr. Jc Baca will ambulate and/or perform functional activities for 15 consecutive minutes with stable vital signs and no rests required to improve activity tolerance within 7 treatment days.   ________________________________________________________________________________________________    PHYSICAL THERAPY: Daily Note and PM Treatment Day # 4    Eleanor Caemron is a 67 y.o. male   PRIMARY DIAGNOSIS: Acute respiratory failure with hypoxia ([facility]  CAP (community acquired pneumonia) [J18.9]         ASSESSMENT:     REHAB RECOMMENDATIONS: CURRENT LEVEL OF FUNCTION:  (Most Recently Demonstrated)   Recommendation to date pending progress:  Setting:   Short-term Rehab   Equipment:    Rolling Walker Bed Mobility:   Not tested  Sit to Stand:  [employer] Assistance  Transfers:   Standby Assistance  Gait/Mobility:   Not tested     ASSESSMENT:  Mr. Lynnette Bamberger presents sitting up in the recliner with CPAP mask 75% SpO2 94%. He declined standing attempts, stating, \"You guys are wearing me out. \" Performed seated exercises with frequent rest breaks and SPO2 remained >90% throughout. Required frequent cues to leave CPAP mask in place as he kept attempting to remove it to speak and perseverating on drinking water. No real progress this session. SUBJECTIVE:   Mr. Lynnette Bamberger states, \"I want some water. \"    SOCIAL HISTORY/ LIVING ENVIRONMENT: Patient lives in a 2 story home (basement) on the main level with his spouse. His son lives \"200 feet from me. \" He typically is independent with ambulation/ADLs and able to perform yard work at baseline, however does admit to balance challenges even at baseline and experience a fall prior to admission.    Home Environment: Private residence  # Steps to Enter: 1  One/Two Story Residence: Two story, live on 1st floor  Living Alone: No  Support Systems: Spouse/Significant Other/Partner, Child(dileep)  OBJECTIVE:     PAIN: VITAL SIGNS: LINES/DRAINS:   Pre Treatment: Pain Screen  Pain Scale 1: Numeric (0 - 10)  Pain Intensity 1: 0  Post Treatment: 0/10 Vital Signs  O2 Sat (%): 94 %  O2 Device: CPAP mask  FIO2 (%): 75 %   O2 Device: CPAP mask     MOBILITY: I Mod I S SBA CGA Min Mod Max Total  NT x2 Comments:   Bed Mobility    Rolling [] [] [] [] [] [] [] [] [] [x] []    Supine to Sit [] [] [] [] [] [] [] [] [] [x] []    Scooting [] [] [] [] [] [] [] [] [] [x] []    Sit to Supine [] [] [] [] [] [] [] [] [] [x] []    Transfers    Sit to Stand [] [] [] [] [] [] [] [] [] [x] []    Bed to Chair [] [] [] [] [] [] [] [] [] [x] []    Stand to Sit [] [] [] [] [] [] [] [] [] [x] []    I=Independent, Mod I=Modified Independent, S=Supervision, SBA=Standby Assistance, CGA=Contact Guard Assistance,   Min=Minimal Assistance, Mod=Moderate Assistance, Max=Maximal Assistance, Total=Total Assistance, NT=Not Tested    GAIT: I Mod I S SBA CGA Min Mod Max Total  NT x2 Comments:   Level of Assistance [] [] [] [] [] [] [] [] [] [x] []    Distance NA    DME N/A    Gait Quality n/a    I=Independent, Mod I=Modified Independent, S=Supervision, SBA=Standby Assistance, CGA=Contact Guard Assistance,   Min=Minimal Assistance, Mod=Moderate Assistance, Max=Maximal Assistance, Total=Total Assistance, NT=Not Tested    PLAN:   FREQUENCY/DURATION: PT Plan of Care: 3 times/week for duration of hospital stay or until stated goals are met, whichever comes first.  TREATMENT:     TREATMENT:   ($$ Therapeutic Exercises: 8-22 mins    )    Therapeutic Exercise: (  10 minutes):  Exercises per grid below to improve mobility, strength and endurance. Required minimal visual and verbal cues to promote proper body mechanics. Progressed complexity of movement as indicated.        Date:  01/07/21 Date:  1/10/21 Date:  1/13/21   Activity/Exercise Parameters Parameters Parameters   Ankle pumps x10 B A 10x B 10x B   LAQ x10 B A 20x B 10x B   Seated marching x10 B A 20x B    Seated hip abd  20x B 10x B   Calf raises  10x B in walker    Sit to stand  5x    Standing hip abd  10x B    Standing marching  10x B      AFTER TREATMENT POSITION/PRECAUTIONS:  Alarm Activated, Chair, Needs within reach and RN notified    INTERDISCIPLINARY COLLABORATION:  RN/PCT and PT/PTA    TOTAL TREATMENT DURATION:  PT Patient Time In/Time Out  Time In: 1408  Time Out: [address], PT, DPT

## 2021-01-13 NOTE — PROGRESS NOTES
Nutrition Note    General nutrition management/ other reason Please assess if patient will benefit from IV versus tube feeding. As he is not able to eat due to quick desats (Hospitalist)    His intake for his first week of admission was 50 to 75% of his needs. This past week his intake been <25% of his needs so nutrition support would be indicated at this time. His need for BIPAP and desaturation when BIPAP is removed has been the main barrier to po. RN ( Mariano Ng RN) reports she took BIPAP off to give him some water po today and he did not tolerate that. Therefore he would not be able to tolerate enough ONS intake to meet his needs either. Therefore would be a candidate for peripheral PN at this time as he can't tolerate the BIPAP off long enough for a FT to be placed and he does not have a central line. Of course if he ends up intubated, then a FT could be placed at that time. He won't be able to receive much nutrition thru a peripheral line d/t concentration and volume limitations. Would he be a candidate for a central line?  If TPN is pursued, please order nutrition consult for TPN management    Above communicated via Perfect Serve to Dr Mike Higgins    Electronically signed by Ton Keyes RD on 1/13/2021 at 4:09 PM    Contact: [phone]

## 2021-01-14 LAB
ALBUMIN SERPL-MCNC: 2 G/DL (ref 3.2–4.6)
ALBUMIN/GLOB SERPL: 0.5 {RATIO} (ref 1.2–3.5)
ALP SERPL-CCNC: 80 U/L (ref 50–136)
ALT SERPL-CCNC: 50 U/L (ref 12–65)
ANION GAP SERPL CALC-SCNC: 3 MMOL/L (ref 7–16)
AST SERPL-CCNC: 36 U/L (ref 15–37)
BASOPHILS # BLD: 0 K/UL (ref 0–0.2)
BASOPHILS NFR BLD: 0 % (ref 0–2)
BILIRUB SERPL-MCNC: 0.8 MG/DL (ref 0.2–1.1)
BUN SERPL-MCNC: 31 MG/DL (ref 8–23)
CALCIUM SERPL-MCNC: 8.3 MG/DL (ref 8.3–10.4)
CHLORIDE SERPL-SCNC: 97 MMOL/L (ref 98–107)
CO2 SERPL-SCNC: 33 MMOL/L (ref 21–32)
CREAT SERPL-MCNC: 0.82 MG/DL (ref 0.8–1.5)
DIFFERENTIAL METHOD BLD: ABNORMAL
EOSINOPHIL # BLD: 0 K/UL (ref 0–0.8)
EOSINOPHIL NFR BLD: 0 % (ref 0.5–7.8)
ERYTHROCYTE [DISTWIDTH] IN BLOOD BY AUTOMATED COUNT: 14.6 % (ref 11.9–14.6)
GLOBULIN SER CALC-MCNC: 4.4 G/DL (ref 2.3–3.5)
GLUCOSE BLD STRIP.AUTO-MCNC: 206 MG/DL (ref 65–100)
GLUCOSE BLD STRIP.AUTO-MCNC: 213 MG/DL (ref 65–100)
GLUCOSE BLD STRIP.AUTO-MCNC: 237 MG/DL (ref 65–100)
GLUCOSE BLD STRIP.AUTO-MCNC: 340 MG/DL (ref 65–100)
GLUCOSE SERPL-MCNC: 167 MG/DL (ref 65–100)
HCT VFR BLD AUTO: 30 % (ref 41.1–50.3)
HGB BLD-MCNC: 9.9 G/DL (ref 13.6–17.2)
IMM GRANULOCYTES # BLD AUTO: 0.1 K/UL (ref 0–0.5)
IMM GRANULOCYTES NFR BLD AUTO: 1 % (ref 0–5)
LYMPHOCYTES # BLD: 0.5 K/UL (ref 0.5–4.6)
LYMPHOCYTES NFR BLD: 4 % (ref 13–44)
MAGNESIUM SERPL-MCNC: 2.3 MG/DL (ref 1.8–2.4)
MCH RBC QN AUTO: 32.4 PG (ref 26.1–32.9)
MCHC RBC AUTO-ENTMCNC: 33 G/DL (ref 31.4–35)
MCV RBC AUTO: 98 FL (ref 79.6–97.8)
MONOCYTES # BLD: 0.8 K/UL (ref 0.1–1.3)
MONOCYTES NFR BLD: 7 % (ref 4–12)
NEUTS SEG # BLD: 10.2 K/UL (ref 1.7–8.2)
NEUTS SEG NFR BLD: 88 % (ref 43–78)
NRBC # BLD: 0 K/UL (ref 0–0.2)
PHOSPHATE SERPL-MCNC: 3.8 MG/DL (ref 2.3–3.7)
PLATELET # BLD AUTO: 253 K/UL (ref 150–450)
PMV BLD AUTO: 10.5 FL (ref 9.4–12.3)
POTASSIUM SERPL-SCNC: 4.5 MMOL/L (ref 3.5–5.1)
PROT SERPL-MCNC: 6.4 G/DL (ref 6.3–8.2)
RBC # BLD AUTO: 3.06 M/UL (ref 4.23–5.6)
SODIUM SERPL-SCNC: 133 MMOL/L (ref 138–145)
TRIGL SERPL-MCNC: 154 MG/DL (ref 35–150)
WBC # BLD AUTO: 11.7 K/UL (ref 4.3–11.1)

## 2021-01-14 PROCEDURE — 84100 ASSAY OF PHOSPHORUS: CPT

## 2021-01-14 PROCEDURE — 85025 COMPLETE CBC W/AUTO DIFF WBC: CPT

## 2021-01-14 PROCEDURE — 36415 COLL VENOUS BLD VENIPUNCTURE: CPT

## 2021-01-14 PROCEDURE — 94762 N-INVAS EAR/PLS OXIMTRY CONT: CPT

## 2021-01-14 PROCEDURE — 74011250637 HC RX REV CODE- 250/637: Performed by: INTERNAL MEDICINE

## 2021-01-14 PROCEDURE — 77010033711 HC HIGH FLOW OXYGEN

## 2021-01-14 PROCEDURE — 84478 ASSAY OF TRIGLYCERIDES: CPT

## 2021-01-14 PROCEDURE — 74011250636 HC RX REV CODE- 250/636: Performed by: INTERNAL MEDICINE

## 2021-01-14 PROCEDURE — 80053 COMPREHEN METABOLIC PANEL: CPT

## 2021-01-14 PROCEDURE — 74011636637 HC RX REV CODE- 636/637: Performed by: INTERNAL MEDICINE

## 2021-01-14 PROCEDURE — 82962 GLUCOSE BLOOD TEST: CPT

## 2021-01-14 PROCEDURE — 94640 AIRWAY INHALATION TREATMENT: CPT

## 2021-01-14 PROCEDURE — 74011250636 HC RX REV CODE- 250/636: Performed by: ANESTHESIOLOGY

## 2021-01-14 PROCEDURE — 83735 ASSAY OF MAGNESIUM: CPT

## 2021-01-14 PROCEDURE — 65270000029 HC RM PRIVATE

## 2021-01-14 RX ADMIN — FUROSEMIDE 20 MG: 10 INJECTION INTRAMUSCULAR; INTRAVENOUS at 17:12

## 2021-01-14 RX ADMIN — INSULIN LISPRO 4 UNITS: 100 INJECTION, SOLUTION INTRAVENOUS; SUBCUTANEOUS at 07:30

## 2021-01-14 RX ADMIN — INSULIN LISPRO 8 UNITS: 100 INJECTION, SOLUTION INTRAVENOUS; SUBCUTANEOUS at 21:55

## 2021-01-14 RX ADMIN — MAGNESIUM GLUCONATE 500 MG ORAL TABLET 400 MG: 500 TABLET ORAL at 09:58

## 2021-01-14 RX ADMIN — ASPIRIN 81 MG: 81 TABLET, COATED ORAL at 21:54

## 2021-01-14 RX ADMIN — Medication 10 ML: at 21:55

## 2021-01-14 RX ADMIN — ACETAMINOPHEN 650 MG: 325 TABLET, FILM COATED ORAL at 22:15

## 2021-01-14 RX ADMIN — HEPARIN SODIUM 5000 UNITS: 5000 INJECTION INTRAVENOUS; SUBCUTANEOUS at 17:12

## 2021-01-14 RX ADMIN — ALBUTEROL SULFATE 2 PUFF: 108 INHALANT RESPIRATORY (INHALATION) at 20:45

## 2021-01-14 RX ADMIN — BUDESONIDE AND FORMOTEROL FUMARATE DIHYDRATE 2 PUFF: 160; 4.5 AEROSOL RESPIRATORY (INHALATION) at 09:55

## 2021-01-14 RX ADMIN — HEPARIN SODIUM 5000 UNITS: 5000 INJECTION INTRAVENOUS; SUBCUTANEOUS at 00:00

## 2021-01-14 RX ADMIN — LORATADINE 10 MG: 10 TABLET ORAL at 09:58

## 2021-01-14 RX ADMIN — BUDESONIDE AND FORMOTEROL FUMARATE DIHYDRATE 2 PUFF: 160; 4.5 AEROSOL RESPIRATORY (INHALATION) at 20:45

## 2021-01-14 RX ADMIN — FUROSEMIDE 20 MG: 10 INJECTION INTRAMUSCULAR; INTRAVENOUS at 09:00

## 2021-01-14 RX ADMIN — Medication 10 ML: at 05:14

## 2021-01-14 RX ADMIN — CLOPIDOGREL BISULFATE 75 MG: 75 TABLET ORAL at 09:58

## 2021-01-14 RX ADMIN — ROSUVASTATIN 10 MG: 10 TABLET, FILM COATED ORAL at 21:54

## 2021-01-14 RX ADMIN — INSULIN LISPRO 4 UNITS: 100 INJECTION, SOLUTION INTRAVENOUS; SUBCUTANEOUS at 12:43

## 2021-01-14 RX ADMIN — MONTELUKAST SODIUM 10 MG: 10 TABLET, FILM COATED ORAL at 21:54

## 2021-01-14 RX ADMIN — HEPARIN SODIUM 5000 UNITS: 5000 INJECTION INTRAVENOUS; SUBCUTANEOUS at 09:58

## 2021-01-14 RX ADMIN — ALBUTEROL SULFATE 2 PUFF: 108 INHALANT RESPIRATORY (INHALATION) at 09:55

## 2021-01-14 NOTE — CONSULTS
[facility] Critical Care COVID-19 Note:    Critical Care Progress Note: 1/14/2021    [facility]  Admission Date: 12/31/2020     Length of Stay: 14 days    Background: 67 y.o. y/o male with acute hypoxemic respiratory failure secondary to COVID-19. Date of COVID-19 symptom onset:    Notable PMH: 66 yo CM with past history of CAD s/p stents, ischemic cardiomyopathy, PAD (on Plavix and cilostazo), COPD, DM type II, HTN, HLD presents via EMS after fall at home and was admitted due to acute respiratory failure due to COVID and had an episode of VTach @ 250bpm.  Covid screen came back positive on 12/31. Family has refused for remdesivir and convalescent plasma. Patient has been managed on the floor now with escalating O2 requirement. On CPAP 8-10 with % 1/12       No acute events overnight. satting 92% on 100% Airvo with NRB, mild tachypnea, patient reports feeling tired. Drug Allergies: Allergies   Allergen Reactions    Daypro [Oxaprozin] Other (comments)     ELEVATED BLOOD PRESSURE           REVIEW OF SYSTEMS:  Constitutional:  denies fever, No chills, night sweats, weight loss, weight gain  Eyes:  Denies problems with  blurred vision, or visual field loss. ENTM:  Denies sore throat, denies loss of taste or smell  Lymph:  Denies swollen glands. Cardiac:  No chest pain, pressure, discomfort, palpitations, orthopnea, murmurs, or edema. Resp: endorse shortness of breath worse with movement. GI:  No dysphagia, heartburn reflux, nausea/vomiting, diarrhea, abdominal pain, or bleeding. :Denies history of dysuria, hematuria, polyuria, or decreased urine output. MS:  No history of myalgias, some LBP, no muscle cramps. Skin:  No history of rashes, jaundice, cyanosis, nodules, or ulcers. Endo:  Negative for heat or cold intolerance. No polyuria/polydipsia  Psych:  Mild anxiety, No depression, insomnia, hallucinations.   Neuro:  Denies AMS, persistent headache, decreased level of consciousness,or motor or sensory deficits. Visit Vitals  /68 (BP 1 Location: Right arm, BP Patient Position: At rest)   Pulse 72   Temp 36.9 °C (98.4 °F)   Resp 22   Ht 6' 1\" (1.854 m)   Wt 131.1 kg (289 lb)   SpO2 98%   BMI 38.13 kg/m²       I/O:      Intake/Output Summary (Last 24 hours) at 1/14/2021 1458  Last data filed at 1/13/2021 1657  Gross per 24 hour   Intake 0 ml   Output 450 ml   Net -450 ml        Pertinent Exam:            Constitutional: awake, alert, increased work of breathing. EENMT:  Sclera clear, pupils equal, oral mucosa moist  Respiratory: crackles bilateral, rhonchi R>L some tightness and wheezing  Cardiovascular:  RRR with no M,G,R; +1 edema  Gastrointestinal:  soft with no tenderness; positive bowel sounds present  Musculoskeletal:  warm with no cyanosis, +1 lower extremity edema  Skin:  no jaundice or ecchymosis  Neurologic: no gross neuro deficits     Psychiatric: no hallucinations, alert    CXR:    Lines (insertion date): PIVs    Drips:    COVID-19 Meds:  Dexamethasone 6mg daily (12/31- 1/9)  Remdesivir patient/family refused? Convalescent plasma not used. SARS-CoV-2 LAB Results    Microbiology Results  Date  Source Culture Result          Anti-infectives (start date):   Rocephin given 12/31 and 1/1  Doxycycline 12/31 - 1/4  No abx currently.     Pertinent Labs:   Recent Labs     01/14/21  0957 01/13/21  0444 01/12/21  1054   WBC 11.7* 12.3* 10.8   HGB 9.9* 9.8* 10.9*   HCT 30.0* 29.7* 32.9*    249 230     Recent Labs     01/14/21  0957 01/13/21  0444 01/12/21  1054   * 132* 131*   K 4.5 4.7 4.9   CL 97* 95* 95*   CO2 33* 31 30   * 145* 171*   BUN 31* 26* 21   CREA 0.82 0.99 0.89   MG 2.3  --   --    CA 8.3 8.6 8.6   PHOS 3.8*  --   --    ALB 2.0* 2.1* 2.3*   TBILI 0.8 0.7 0.9   ALT 50 46 53     Recent Labs     01/14/21  1158 01/14/21  0803 01/13/21  2042   GLUCPOC 237* 213* 197*       Oxygen Requirements:  Date O2 support mode FiO2 SaO2 IMPRESSION:  Active Hospital Problems    Diagnosis Date Noted    CAP (community acquired pneumonia) 12/31/2020    [facility]omorphic [facility] tachycardia ([facility] 12/31/2020    COVID-19 12/31/2020    Elevated troponin 12/31/2020    Hyperlipidemia 11/10/2015    PAD (peripheral artery disease) ([facility] 11/10/2015    COPD (chronic obstructive pulmonary disease) ([facility] 04/10/2015    Acute respiratory failure with hypoxia ([facility] 10/23/2014    AGUSTIN (acute kidney injury) ([facility] 09/15/2014    DM (diabetes mellitus) type II, controlled, with peripheral vascular disorder ([facility] 09/11/2014    Morbid obesity ([facility] 10/12/2011    Coronary artery disease involving native coronary artery of native heart without angina pectoris 10/12/2011       67 y.o. y/o male with acute hypoxemic respiratory failure secondary to COVID-19. PLAN:  1. Acute respiratory failure with hypoxia: supportive measures with noninvasive oxygen strategy unless tiring or desaturation. Self-proning encouraged. Lasix for diuresis (20mg IV BID ordered) but only started 1/12.  - OOB to chair, discussed need for changes in position with RN.   - PT consult. 2. COVID-19 pneumonia:  Completed decadron course  3. Nutrition: PO per medicine. 4. PPx:  DVT ppx ongoing. (on plavix 2/2 CAD) will recheck dimer, may consider adding heparin/lovenox. 5. Does not meet need for ICU/Intubation at current on 100% airvo with NRB but at high risk of deterioration as patient states today he is feeling more tired. Please call with mental status change or if he is placed on bipap. Will continue to follow     Full Code  Will call wife to update her. The patient is critically ill with respiratory failure and requires high complexity decision making for assessment and support including frequent evaluation and titration of therapies, application of advanced monitoring technologies & timing & modality of advanced respiratory support.   Cumulative time devoted to patient care services, counseling and coordination of care  by me for day of service -27 min        Bryant Crespo MD

## 2021-01-14 NOTE — PROGRESS NOTES
Patient drank 474 ml of Nepro and ate sweet potatoes. Took bite or drink then replaced oxygen.  Never really without it sats stayed in the 90s

## 2021-01-14 NOTE — PROGRESS NOTES
Am assessment completed. Pt is alert and oriented. Verbalizes needs well Takes pills whole with thin liquids. Pt was switched to heated high flow NC and non-rebreather 60/100. Denies pain. Continue to monitor.

## 2021-01-14 NOTE — CONSULTS
Comprehensive Nutrition Assessment    Type and Reason for Visit: Reassess, Consult  TPN Management (Hospitalist)    Nutrition Recommendations/Plan:    Continue current diet   Change oral nutrition supplement to Nepro (425 kcal, 19 g pro) and increase to 4 per day.  Will hold on initiating TPN until PO intake determined     Malnutrition Assessment:  Malnutrition Status: At risk for malnutrition (specify)(increased O2 demands limiting PO)    Nutrition Assessment:   Nutrition History: PTA pt was eating normally with 3 or so meals a day. Patient reports consistent weight based on weighing himself twice daily PTA. Nutrition Background: PMH: CAD s/p stents, ischemic cardiomyopathy, PAD,COPD, DM type II, HTN, HLD. Presented after fall at home and was admitted due to acute respiratory failure due to [place] and had an episode of VTach. Daily Update:  Received consult due to patient unable to come off CPAP for PO. Called and spoke with RN who states patient now on heated NC with NRB. She states that he was able to drink a whole cup of water and a whole Glucerna this am, but doubts he would be able to eat a meal. Discussed plan to change nutrition supplement to higher kcal supplement and send extra on dinner tray for snack. She states patient really enjoys them and would like that. Nutrition Related Findings:   NFPE deferred d/t isolation      Current Nutrition Therapies:  DIET DIABETIC CONSISTENT CARB Regular  DIET NUTRITIONAL SUPPLEMENTS All Meals; Glucerna Shake ( )    Current Intake:   Average Meal Intake: (unable to take solid meals) Average Supplement Intake: %(Pt reports drinking 100% of glucerna shakes at all 3 meals)      Anthropometric Measures:  Height: 6' 1\" (185.4 cm)  Current Body Wt: 131 kg (288 lb 12.8 oz), Weight source: Not specified  BMI: 38.1, Obese class 2 (BMI 35.0-39. 9)     Ideal Body Wt: 184 lbs (84 kg), 157 %  Usual Body Wt: (286-298# per EMR),            Estimated Daily Nutrient Needs:  Energy (kcal/day): 1361-8109 kcal/day (Kcal/kg(15-18 kcal/kg), Weight Used: Admission(131.1 kg-unspecified source))  Protein (g/day):  g/day Weight Used: ((20% daily needs))  Fluid (ml/day):   (1 ml/kcal)    Nutrition Diagnosis:   · Inadequate oral intake related to (breathing difficulties) as evidenced by (intake as above)    Nutrition Interventions:   Food and/or Nutrient Delivery: Continue current diet, Modify oral nutrition supplement     Coordination of Nutrition Care: Continue to monitor while inpatient  Plan of Care discussed with Sylvain Ojeda RN and Perfect Serve message to Merl Sacks, NP    Goals:   Previous Goal Met: Progress towards goal(s) declining  Active Goal: Increase intake to > 75% needs through current diet and ONS within one week    Nutrition Monitoring and Evaluation:      Food/Nutrient Intake Outcomes: Food and nutrient intake, Supplement intake       Discharge Planning:    Continue current diet, Continue oral nutrition supplement    Lorraine Cosby RD, , LD on 1/14/2021 at 11:38 AM  Contact: [phone]        Disaster Mode active

## 2021-01-14 NOTE — PROGRESS NOTES
Progress Note  Date:2021       Room:[facility]  Patient Name:Denny Faulkner     YOB: 1945     Age:75 y.o. Subjective    Subjective   66 yo CM with past history of CAD s/p stents, ischemic cardiomyopathy, PAD (on Plavix and cilostazo), COPD, DM type II, HTN, HLD presents via EMS after fall at home and was admitted due to acute respiratory failure due to COVID and had an episode of VTach. Family has refused for remdesivir and convalescent plasma. Patient oxygen requirement has been worsening. Initially he was on 40 to 60 L airvo BiPAP at night. Now for the past 2 days patient is on CPAP 75 - 100%. Unable to wean him to the OptiFlow. Pulmonology consulted and following. :  Patient alert and oriented x3. Asking for water. Afebrile. WBC 12.3. Remains on CPAP intermittently, now on airvo 60 l with saturations 92%. Review of Systems   Constitutional:  denies fever, No chills, night sweats, weight loss, weight gain  Eyes:  Denies problems with eye pain, erythema, blurred vision, or visual field loss. ENTM:  Denies sore throat, deniesloss of taste or smell  Lymph:  Denies swollen glands. Cardiac:  No chest pain, pressure, discomfort, palpitations, orthopnea, murmurs, or edema. GI:  No dysphagia, heartburn reflux, nausea/vomiting, diarrhea, abdominal pain, or bleeding. :Denies history of dysuria, hematuria, polyuria, or decreased urine output. MS:  No history of myalgias, some LBP, no muscle cramps. Skin:  No history of rashes, jaundice, cyanosis, nodules, or ulcers. Endo:  Negative for heat or cold intolerance. No polyuria/polydipsia  Psych:  Mild anxiety, No depression, insomnia, hallucinations. Neuro:  Denies AMS, persistent headache, decreased level of consciousness,or motor or sensory deficits.   Objective         Vitals Last 24 Hours:  TEMPERATURE:  Temp  Av.2 °F (36.8 °C)  Min: 97.6 °F (36.4 °C)  Max: 98.4 °F (36.9 °C)  RESPIRATIONS RANGE: Resp  Av.6  Min: 18  Max: 25  PULSE OXIMETRY RANGE: SpO2  Av.1 %  Min: 92 %  Max: 100 %  PULSE RANGE: Pulse  Av  Min: 65  Max: 99  BLOOD PRESSURE RANGE: Systolic (24hrs), Av , Min:115 , Max:160   ; Diastolic (24hrs), Av, Min:61, Max:83    I/O (24Hr):    Intake/Output Summary (Last 24 hours) at 2021 1017  Last data filed at 2021 1657  Gross per 24 hour   Intake 0 ml   Output 1100 ml   Net -1100 ml     Objective   GENERAL: alert, cooperative, moderate resp distress, appears stated age  EYE: conjunctivae/corneas clear. PERRL.  THROAT & NECK: normal and no erythema or exudates noted.   LUNG: Breath sounds bilateral, bibasilar crackles  HEART: regular rate and rhythm, S1S2, no murmur, no JVD  ABDOMEN: soft, non-tender, non-distended. Bowel sounds normal.   EXTREMITIES:  No edema, 2+ pedal/radial pulses bilaterally  SKIN: no rash or abnormalities  NEUROLOGIC: A&Ox3. Cranial nerves 2-12 grossly intact.    Labs/Imaging/Diagnostics    Labs:  CBC:  Recent Labs     214 21  1054 21  1107   WBC 12.3* 10.8 7.4   RBC 3.05* 3.34* 3.31*   HGB 9.8* 10.9* 10.7*   HCT 29.7* 32.9* 32.2*   MCV 97.4 98.5* 97.3   RDW 14.5 14.6 14.5    230 218     CHEMISTRIES:  Recent Labs     21  0444 21  1054 21  1107   * 131* 135*   K 4.7 4.9 4.9   CL 95* 95* 98   CO2 31 30 32   BUN 26* 21 21   CA 8.6 8.6 8.8   PT/INR:No results for input(s): INR, INREXT in the last 72 hours.    No lab exists for component: PROTIME  APTT:No results for input(s): APTT in the last 72 hours.  LIVER PROFILE:  Recent Labs     214 21  1054 21  1107   AST 37 41* 46*   ALT 46 53 61     Lab Results   Component Value Date/Time    ALT (SGPT) 46 2021 04:44 AM    AST (SGOT) 37 2021 04:44 AM    Alk. phosphatase 84 2021 04:44 AM    Bilirubin, direct <0.1 2014 05:00 AM    Bilirubin, total 0.7 2021 04:44 AM       Imaging Last 24 Hours:  No  results found.   Assessment//Plan   Principal Problem:    Acute respiratory failure with hypoxia ([address] (10/23/2014)    Active Problems:    Coronary artery disease involving native coronary artery of native heart without angina pectoris (10/12/2011)      Morbid obesity ([address] (10/12/2011)      DM (diabetes mellitus) type II, controlled, with peripheral vascular disorder ([address] (9/11/2014)      AGUSTIN (acute kidney injury) ([address] (9/15/2014)      COPD (chronic obstructive pulmonary disease) ([address] (4/10/2015)      PAD (peripheral artery disease) ([facility] (11/10/2015)      Hyperlipidemia (11/10/2015)      CAP (community acquired pneumonia) (12/31/2020)      Monomorphic ventricular tachycardia ([facility] (12/31/2020)      COVID-19 (12/31/2020)      Elevated troponin (12/31/2020)      Assessment & Plan    Acute respiratory failure with hypoxia secondary to COVID-19 infection:  - On CPAP intermittently, now airvo 60 l with saturations 92%  - High risk for deterioration requiring mechanical ventilation  - Completed Decadron  - Refused Remdesivir and convalescent plasma initially  - Continue home aerosols  - Completed antibiotics for possible CAP  - Wean oxygen as appropriate  - Pulmonology consulted, appreciate recommendations  - Continue Lasix       Monomorphic ventricular tachycardia:  - Patient found to have monomorphic VT with EMS, converted on his own  - Given Mag in ER  - Cardiology saw him in ER and recommended correcting electrolytes  - Start Amio if it happens again  - Cardiology signed off, didn't write a note?       Bacteremia due to gram positive bacteria:  - Initial blood cultures 12/23 with strep + staph but both common contaminants  - Repeat cultures NGTD   - If persistent, will need ECHO       AGUSTIN:  - Resolved       Elevated troponin:  - Troponin 72 --> 425 --> 391  - No acute CP  - ?COVID related vs VT  - Repeat troponin until trending down  - Continue ASA + Plavix  - Cardiology assess in ER(?) -->  Previous attending has spoken to them and they think it's COVID       DM (diabetes mellitus) type II:  - A1C on 12/16/20 was 6.8  - Blood sugars in 300s, patient is on steroids   - Increase Lantus to 35 units   - continue Humalog SSI       Coronary artery disease:  - Stable  - Continue ASA + Plavix  - Continue Crestor       COPD:  - No acute wheezing  - Continue home aerosols       PAD:  - Continue ASA + Plavix       Hyperlipidemia:   - Continue Crestor    Electronically signed by Emil Baca NP on 1/14/2021 at 10:17 AM

## 2021-01-15 ENCOUNTER — APPOINTMENT (OUTPATIENT)
Dept: ULTRASOUND IMAGING | Age: 76
DRG: 177 | End: 2021-01-15
Attending: INTERNAL MEDICINE
Payer: MEDICARE

## 2021-01-15 LAB
ALBUMIN SERPL-MCNC: 1.9 G/DL (ref 3.2–4.6)
ALBUMIN/GLOB SERPL: 0.4 {RATIO} (ref 1.2–3.5)
ALP SERPL-CCNC: 88 U/L (ref 50–136)
ALT SERPL-CCNC: 74 U/L (ref 12–65)
ANION GAP SERPL CALC-SCNC: 7 MMOL/L (ref 7–16)
AST SERPL-CCNC: 49 U/L (ref 15–37)
BASOPHILS # BLD: 0.1 K/UL (ref 0–0.2)
BASOPHILS NFR BLD: 0 % (ref 0–2)
BILIRUB SERPL-MCNC: 0.6 MG/DL (ref 0.2–1.1)
BUN SERPL-MCNC: 37 MG/DL (ref 8–23)
CALCIUM SERPL-MCNC: 8.8 MG/DL (ref 8.3–10.4)
CHLORIDE SERPL-SCNC: 98 MMOL/L (ref 98–107)
CO2 SERPL-SCNC: 31 MMOL/L (ref 21–32)
CREAT SERPL-MCNC: 1.04 MG/DL (ref 0.8–1.5)
DIFFERENTIAL METHOD BLD: ABNORMAL
EOSINOPHIL # BLD: 0 K/UL (ref 0–0.8)
EOSINOPHIL NFR BLD: 0 % (ref 0.5–7.8)
ERYTHROCYTE [DISTWIDTH] IN BLOOD BY AUTOMATED COUNT: 14.2 % (ref 11.9–14.6)
GLOBULIN SER CALC-MCNC: 4.5 G/DL (ref 2.3–3.5)
GLUCOSE BLD STRIP.AUTO-MCNC: 177 MG/DL (ref 65–100)
GLUCOSE BLD STRIP.AUTO-MCNC: 232 MG/DL (ref 65–100)
GLUCOSE BLD STRIP.AUTO-MCNC: 240 MG/DL (ref 65–100)
GLUCOSE BLD STRIP.AUTO-MCNC: 362 MG/DL (ref 65–100)
GLUCOSE SERPL-MCNC: 209 MG/DL (ref 65–100)
HCT VFR BLD AUTO: 31 % (ref 41.1–50.3)
HGB BLD-MCNC: 10.3 G/DL (ref 13.6–17.2)
IMM GRANULOCYTES # BLD AUTO: 0.3 K/UL (ref 0–0.5)
IMM GRANULOCYTES NFR BLD AUTO: 2 % (ref 0–5)
LYMPHOCYTES # BLD: 0.8 K/UL (ref 0.5–4.6)
LYMPHOCYTES NFR BLD: 5 % (ref 13–44)
MCH RBC QN AUTO: 31.9 PG (ref 26.1–32.9)
MCHC RBC AUTO-ENTMCNC: 33.2 G/DL (ref 31.4–35)
MCV RBC AUTO: 96 FL (ref 79.6–97.8)
MONOCYTES # BLD: 1.1 K/UL (ref 0.1–1.3)
MONOCYTES NFR BLD: 7 % (ref 4–12)
NEUTS SEG # BLD: 13.9 K/UL (ref 1.7–8.2)
NEUTS SEG NFR BLD: 86 % (ref 43–78)
NRBC # BLD: 0 K/UL (ref 0–0.2)
PLATELET # BLD AUTO: 240 K/UL (ref 150–450)
PMV BLD AUTO: 10.9 FL (ref 9.4–12.3)
POTASSIUM SERPL-SCNC: 4.4 MMOL/L (ref 3.5–5.1)
PROT SERPL-MCNC: 6.4 G/DL (ref 6.3–8.2)
RBC # BLD AUTO: 3.23 M/UL (ref 4.23–5.6)
SODIUM SERPL-SCNC: 136 MMOL/L (ref 136–145)
WBC # BLD AUTO: 16.2 K/UL (ref 4.3–11.1)

## 2021-01-15 PROCEDURE — 65610000006 HC RM INTENSIVE CARE

## 2021-01-15 PROCEDURE — 74011250636 HC RX REV CODE- 250/636: Performed by: INTERNAL MEDICINE

## 2021-01-15 PROCEDURE — 99233 SBSQ HOSP IP/OBS HIGH 50: CPT | Performed by: INTERNAL MEDICINE

## 2021-01-15 PROCEDURE — 94660 CPAP INITIATION&MGMT: CPT

## 2021-01-15 PROCEDURE — 74011250637 HC RX REV CODE- 250/637: Performed by: INTERNAL MEDICINE

## 2021-01-15 PROCEDURE — 93970 EXTREMITY STUDY: CPT

## 2021-01-15 PROCEDURE — 80053 COMPREHEN METABOLIC PANEL: CPT

## 2021-01-15 PROCEDURE — 94762 N-INVAS EAR/PLS OXIMTRY CONT: CPT

## 2021-01-15 PROCEDURE — 94640 AIRWAY INHALATION TREATMENT: CPT

## 2021-01-15 PROCEDURE — 74011636637 HC RX REV CODE- 636/637: Performed by: INTERNAL MEDICINE

## 2021-01-15 PROCEDURE — 74011000250 HC RX REV CODE- 250: Performed by: INTERNAL MEDICINE

## 2021-01-15 PROCEDURE — 74011250636 HC RX REV CODE- 250/636: Performed by: ANESTHESIOLOGY

## 2021-01-15 PROCEDURE — 85025 COMPLETE CBC W/AUTO DIFF WBC: CPT

## 2021-01-15 PROCEDURE — 82962 GLUCOSE BLOOD TEST: CPT

## 2021-01-15 RX ORDER — ALBUTEROL SULFATE 0.83 MG/ML
2.5 SOLUTION RESPIRATORY (INHALATION)
Status: DISCONTINUED | OUTPATIENT
Start: 2021-01-15 | End: 2021-01-19

## 2021-01-15 RX ORDER — BUDESONIDE 0.5 MG/2ML
500 INHALANT ORAL
Status: DISCONTINUED | OUTPATIENT
Start: 2021-01-15 | End: 2021-01-19

## 2021-01-15 RX ORDER — ENOXAPARIN SODIUM 100 MG/ML
70 INJECTION SUBCUTANEOUS EVERY 12 HOURS
Status: DISCONTINUED | OUTPATIENT
Start: 2021-01-15 | End: 2021-01-26 | Stop reason: HOSPADM

## 2021-01-15 RX ADMIN — ROSUVASTATIN 10 MG: 10 TABLET, FILM COATED ORAL at 21:00

## 2021-01-15 RX ADMIN — MONTELUKAST SODIUM 10 MG: 10 TABLET, FILM COATED ORAL at 21:00

## 2021-01-15 RX ADMIN — ENOXAPARIN SODIUM 70 MG: 80 INJECTION SUBCUTANEOUS at 22:26

## 2021-01-15 RX ADMIN — Medication 10 ML: at 05:20

## 2021-01-15 RX ADMIN — ALBUTEROL SULFATE 2.5 MG: 2.5 SOLUTION RESPIRATORY (INHALATION) at 20:00

## 2021-01-15 RX ADMIN — ENOXAPARIN SODIUM 70 MG: 80 INJECTION SUBCUTANEOUS at 11:00

## 2021-01-15 RX ADMIN — LORATADINE 10 MG: 10 TABLET ORAL at 10:33

## 2021-01-15 RX ADMIN — ASPIRIN 81 MG: 81 TABLET, COATED ORAL at 21:01

## 2021-01-15 RX ADMIN — FUROSEMIDE 20 MG: 10 INJECTION INTRAMUSCULAR; INTRAVENOUS at 18:00

## 2021-01-15 RX ADMIN — BUDESONIDE 500 MCG: 0.5 INHALANT RESPIRATORY (INHALATION) at 20:00

## 2021-01-15 RX ADMIN — INSULIN LISPRO 4 UNITS: 100 INJECTION, SOLUTION INTRAVENOUS; SUBCUTANEOUS at 07:30

## 2021-01-15 RX ADMIN — INSULIN LISPRO 4 UNITS: 100 INJECTION, SOLUTION INTRAVENOUS; SUBCUTANEOUS at 16:47

## 2021-01-15 RX ADMIN — INSULIN LISPRO 2 UNITS: 100 INJECTION, SOLUTION INTRAVENOUS; SUBCUTANEOUS at 21:04

## 2021-01-15 RX ADMIN — FUROSEMIDE 20 MG: 10 INJECTION INTRAMUSCULAR; INTRAVENOUS at 10:35

## 2021-01-15 RX ADMIN — CLOPIDOGREL BISULFATE 75 MG: 75 TABLET ORAL at 10:33

## 2021-01-15 RX ADMIN — Medication 10 ML: at 22:00

## 2021-01-15 RX ADMIN — MAGNESIUM GLUCONATE 500 MG ORAL TABLET 400 MG: 500 TABLET ORAL at 10:33

## 2021-01-15 RX ADMIN — HEPARIN SODIUM 5000 UNITS: 5000 INJECTION INTRAVENOUS; SUBCUTANEOUS at 10:33

## 2021-01-15 RX ADMIN — HEPARIN SODIUM 5000 UNITS: 5000 INJECTION INTRAVENOUS; SUBCUTANEOUS at 01:06

## 2021-01-15 RX ADMIN — INSULIN LISPRO 10 UNITS: 100 INJECTION, SOLUTION INTRAVENOUS; SUBCUTANEOUS at 11:30

## 2021-01-15 NOTE — PROGRESS NOTES
Progress Note  Date:1/15/2021       Room:[facility]  Patient Name:Denny Faulkner     YOB: 1945     Age:75 y.o. Subjective    Subjective   66 yo CM with past history of CAD s/p stents, ischemic cardiomyopathy, PAD (on Plavix and cilostazo), COPD, DM type II, HTN, HLD presents via EMS after fall at home and was admitted due to acute respiratory failure due to COVID and had an episode of VTach. Family has refused for remdesivir and convalescent plasma. Patient oxygen requirement has been worsening. Initially he was on 40 to 60 L airvo BiPAP at night. Now for the past 2 days patient is on CPAP 75 - 100%. Unable to wean him to the OptiFlow. Pulmonology consulted and following. 1/15:  Patient alert and oriented x3. On CPAP, saturations 94%. Tired. WBC 16.2. Pulmonology following, family coming today, patient may require intubation. **Spouse-Karishma Faulkner-updated via phone. They are coming to visit. Pulmonologist will speak to family once here about intubation. Patient remains full code. Review of Systems   Constitutional:  denies fever, No chills, night sweats, weight loss, weight gain  Eyes:  Denies problems with eye pain, erythema, blurred vision, or visual field loss. ENTM:  Denies sore throat, deniesloss of taste or smell  Lymph:  Denies swollen glands. Cardiac:  No chest pain, pressure, discomfort, palpitations, orthopnea, murmurs, or edema. GI:  No dysphagia, heartburn reflux, nausea/vomiting, diarrhea, abdominal pain, or bleeding. :Denies history of dysuria, hematuria, polyuria, or decreased urine output. MS:  No history of myalgias, some LBP, no muscle cramps. Skin:  No history of rashes, jaundice, cyanosis, nodules, or ulcers. Endo:  Negative for heat or cold intolerance. No polyuria/polydipsia  Psych:  Mild anxiety, No depression, insomnia, hallucinations.   Neuro:  Denies AMS, persistent headache, decreased level of consciousness,or motor or sensory deficits. Objective         Vitals Last 24 Hours:  TEMPERATURE:  Temp  Av.8 °F (37.1 °C)  Min: 98 °F (36.7 °C)  Max: 101 °F (38.3 °C)  RESPIRATIONS RANGE: Resp  Av.2  Min: 20  Max: 24  PULSE OXIMETRY RANGE: SpO2  Av.2 %  Min: 87 %  Max: 98 %  PULSE RANGE: Pulse  Av.5  Min: 72  Max: 92  BLOOD PRESSURE RANGE: Systolic (76QWE), VG , Min:108 , YEK:606   ; Diastolic (19PNP), GKO:23, Min:57, Max:69    I/O (24Hr): No intake or output data in the 24 hours ending 01/15/21 0943  Objective:  Vital signs: (most recent): Blood pressure 121/69, pulse 82, temperature 98.1 °F (36.7 °C), resp. rate 24, height 6' 1\" (1.854 m), weight 131.1 kg (289 lb), SpO2 92 %. GENERAL: alert, cooperative, moderate resp distress, appears stated age  EYE: conjunctivae/corneas clear. PERRL. THROAT & NECK: normal and no erythema or exudates noted. LUNG: Breath sounds bilateral, bibasilar crackles  HEART: regular rate and rhythm, S1S2, no murmur, no JVD  ABDOMEN: soft, non-tender, non-distended. Bowel sounds normal.   EXTREMITIES:  No edema, 2+ pedal/radial pulses bilaterally  SKIN: no rash or abnormalities  NEUROLOGIC: A&Ox3. Cranial nerves 2-12 grossly intact. Labs/Imaging/Diagnostics    Labs:  CBC:  Recent Labs     01/15/21  0537 01/14/21  0957 21   WBC 16.2* 11.7* 12.3*   RBC 3.23* 3.06* 3.05*   HGB 10.3* 9.9* 9.8*   HCT 31.0* 30.0* 29.7*   MCV 96.0 98.0* 97.4   RDW 14.2 14.6 14.5    253 249     CHEMISTRIES:  Recent Labs     01/15/21  0537 01/14/21  0957 21    133* 132*   K 4.4 4.5 4.7   CL 98 97* 95*   CO2 31 33* 31   BUN 37* 31* 26*   CA 8.8 8.3 8.6   PHOS  --  3.8*  --    MG  --  2.3  --    PT/INR:No results for input(s): INR, INREXT, INREXT in the last 72 hours. No lab exists for component: PROTIME  APTT:No results for input(s): APTT in the last 72 hours.   LIVER PROFILE:  Recent Labs     01/15/21  0537 21  0957 21  0444   AST 49* 36 37 ALT 74* 50 46     Lab Results   Component Value Date/Time    ALT (SGPT) 74 (H) 01/15/2021 05:37 AM    AST (SGOT) 49 (H) 01/15/2021 05:37 AM    Alk. phosphatase 88 01/15/2021 05:37 AM    Bilirubin, direct <0.1 11/24/2014 05:00 AM    Bilirubin, total 0.6 01/15/2021 05:37 AM       Imaging Last 24 Hours:  No results found.   Assessment//Plan   Principal Problem:    Acute respiratory failure with hypoxia ([address] (10/23/2014)    Active Problems:    Coronary artery disease involving native coronary artery of native heart without angina pectoris (10/12/2011)      Morbid obesity ([address] (10/12/2011)      DM (diabetes mellitus) type II, controlled, with peripheral vascular disorder ([address] (9/11/2014)      AGUSTIN (acute kidney injury) ([address] (9/15/2014)      COPD (chronic obstructive pulmonary disease) ([address] (4/10/2015)      PAD (peripheral artery disease) ([address] (11/10/2015)      Hyperlipidemia (11/10/2015)      CAP (community acquired pneumonia) (12/31/2020)      Monomorphic ventricular tachycardia ([address] (12/31/2020)      COVID-19 (12/31/2020)      Elevated troponin (12/31/2020)      Assessment & Plan    Acute respiratory failure with hypoxia secondary to COVID-19 infection:  - Pulmonology following, difficult to wean patient, no tolerating airvo for long periods, appears tired  -family coming today, may be transferred to unit and intubated  - Completed Decadron  - Refused Remdesivir and convalescent plasma initially  - Continue home aerosols  - Completed antibiotics for possible CAP  - Wean oxygen as appropriate  - Continue Lasix       Monomorphic ventricular tachycardia:  - Patient found to have monomorphic VT with EMS, converted on his own  - Given Mag in ER  - Cardiology saw him in ER and recommended correcting electrolytes  - Start Amio if it happens again  - Cardiology signed off, didn't write a note?       Bacteremia due to gram positive bacteria:  - Initial blood cultures 12/23 with strep + staph but both common contaminants  - Repeat cultures NGTD   - If persistent, will need ECHO       AGUSTIN:  - Resolved       Elevated troponin:  - Troponin 72 --> 425 --> 391  - No acute CP  - ?COVID related vs VT  - Repeat troponin until trending down  - Continue ASA + Plavix  - Cardiology assess in ER(?) -->  Previous attending has spoken to them and they think it's COVID       DM (diabetes mellitus) type II:  - A1C on 12/16/20 was 6.8  - Blood sugars in 300s, patient is on steroids   - Increase Lantus to 35 units   - continue Humalog SSI       Coronary artery disease:  - Stable  - Continue ASA + Plavix  - Continue Crestor       COPD:  - No acute wheezing  - Continue home aerosols       PAD:  - Continue ASA + Plavix       Hyperlipidemia:   - Continue Crestor    Electronically signed by Lisa Mascorro NP on 1/15/2021 at 10:17 AM

## 2021-01-15 NOTE — PROGRESS NOTES
Nutrition Note    Talked with Karen Palma RN via phone. She reports, pt continues to be able to tolerate having his mask off long enough to take 2 bottles of Nepro three times a day. He also is taking a few bites of food at each meal.  Current active order is for 4 bottles of Nepro per day. Pt needs ~4.5 bottles per day to meet low end of estimated needs but no greater than 5 bottles per day to stay within estimated needs. Therefore TPN continues to not be needed at this time  Will clarify Nepro as maximum of 5 bottles per day. If status changes to where pt cannot continue to consume ONS, then would recommend PN. If PN pursued, please re order nutrition consult for TPN management. Noted respiratory status remains tenuous and pt still at high for intubation. If pt ends up requiring intubation and FT is inserted,please order nutrition consult for TF management.       Electronically signed by Bienvenido Johnston RD on 1/15/2021 at 3:26 PM    Contact: [phone]

## 2021-01-15 NOTE — PROGRESS NOTES
Chart screened by CM for d/c planning. Pt moved from room 522 to 505 on 1-. Pt is requiring 60L O2 heated HFNC Airvo 100% with NRB. Per progress notes this morning pt's HR was tachycardic. His nutrition intake has been limited r/t high O2 needs. He is able to take a sip of liquid or a bite of food with O2 off but must then immediately replace mask. RD is following - considering TPN as an alternate source of nutrition. Breanna Fountain is following pt, however his O2 needs at the present time are too high for LTAC to accommodate. CM will continue to follow and remain available if any needs arise. Care Management Interventions  PCP Verified by CM: Jennifer Barrett MD)  Mode of Transport at Discharge:  Other (see comment)(Family)  Transition of Care Consult (CM Consult): Discharge Planning  Discharge Durable Medical Equipment: No(CPap @ home)  Physical Therapy Consult: Yes  Occupational Therapy Consult: Yes  Speech Therapy Consult: No  Current Support Network: Lives with Spouse, Own Home  Confirm Follow Up Transport: Family  The Patient and/or Patient Representative was Provided with a Choice of Provider and Agrees with the Discharge Plan?: Yes  Freedom of Choice List was Provided with Basic Dialogue that Supports the Patient's Individualized Plan of Care/Goals, Treatment Preferences and Shares the Quality Data Associated with the Providers?: Yes  [place] Resource Information Provided?: No  Discharge Location  Discharge Placement: Unable to determine at this time

## 2021-01-15 NOTE — ROUTINE PROCESS
TRANSFER - OUT REPORT: 
 
Verbal report given to Kyler Thurman (name) on Sylvester Verdin  being transferred to Room 336 (unit) for change in patient condition(requiring continuous bipap/possible intubation) Report consisted of patients Situation, Background, Assessment and  
Recommendations(SBAR). Information from the following report(s) SBAR, Kardex, Intake/Output, MAR and Recent Results was reviewed with the receiving nurse. Lines:  
Peripheral IV 01/02/21 Left Antecubital (Active) Site Assessment Clean, dry, & intact 01/14/21 1950 Phlebitis Assessment 0 01/14/21 1950 Infiltration Assessment 0 01/14/21 1950 Dressing Status Clean, dry, & intact 01/14/21 1950 Dressing Type Tape;Transparent 01/14/21 1950 Hub Color/Line Status Patent; Flushed 01/13/21 0415 Alcohol Cap Used No 01/03/21 1947 Peripheral IV 01/13/21 Anterior; Left Forearm (Active) Site Assessment Clean, dry, & intact 01/14/21 1950 Phlebitis Assessment 0 01/14/21 1950 Infiltration Assessment 0 01/14/21 1950 Dressing Status Clean, dry, & intact 01/14/21 1950 Dressing Type Tape;Transparent 01/14/21 1950 Opportunity for questions and clarification was provided. Patient transported with: 
 Monitor

## 2021-01-15 NOTE — DIABETES MGMT
Patient admitted with acuter respiratory failure with hypoxia. Blood glucose ranged 167-340 yesterday with patient receiving Humalog 16 units. Blood glucose this morning was 232 Reviewed patient current regimen: Humalog SSI. Noted per chart review nutrition intake limited due to oxygen needs. Per RD note plan is to determine PO intake prior to initiating TPN. Patient also has 4 nepro supplements per day ordered. Provider could consider a resistant sliding scale to help improve glycemic control without significant risk of hypoglycemia. If TPN is initiated patient insulin needs will likely increase.

## 2021-01-15 NOTE — PROGRESS NOTES
01/15/21 1725   Oxygen Therapy   O2 Sat (%) 99 %   Pulse via Oximetry 74 beats per minute   O2 Device CPAP mask   FIO2 (%) 75 %   Respiratory   Respiratory (WDL) X   Respiratory Pattern Tachypneic   Chest/Tracheal Assessment Chest expansion, symmetrical   Breath Sounds Bilateral Coarse;Diminished   Cough Non-productive   CPAP/BIPAP   CPAP/BIPAP Start/Stop On   Device Mode CPAP   Mask Type and Size Large   Skin Condition intact   PIP Observed 11 cm H20   EPAP (cm H2O) 10 cm H2O   Vt Spont (ml) 451 ml   Ve Observed (l/min) 18.5 l/min   Total RR (Spontaneous) 41 breaths per minute   Leak (Estimated) 13 L/min   Pt's Home Machine No   Biomedical Check Performed Yes   Settings Verified Yes   Alarm Settings   High Pressure 30   Low Pressure 5   Apnea 20   Low Ve 2   High Rate 50   Low Rate 8   Pulmonary Toilet   Pulmonary Toilet Cough and deep breath;H. O.B elevated

## 2021-01-15 NOTE — PROGRESS NOTES
01/15/21 0906   Oxygen Therapy   O2 Sat (%) 92 %   Pulse via Oximetry 106 beats per minute   O2 Device CPAP mask   FIO2 (%) 75 %   Respiratory   Respiratory (WDL) X   Respiratory Pattern Tachypneic   Chest/Tracheal Assessment Chest expansion, symmetrical   Breath Sounds Bilateral Coarse;Diminished   Cough Non-productive   CPAP/BIPAP   Device Mode CPAP   $$ CPAP Daily Yes   Mask Type and Size Large   Skin Condition intact   (barrier placed on bridge of nose)   PIP Observed 14 cm H20   EPAP (cm H2O) 10 cm H2O   Vt Spont (ml) 721 ml   Ve Observed (l/min) 23.3 l/min   Total RR (Spontaneous) 32 breaths per minute   Leak (Estimated) 57 L/min   Pt's Home Machine No   Biomedical Check Performed Yes   Settings Verified Yes   Alarm Settings   High Pressure 30   Low Pressure 5   Apnea 20   Low Ve 2   High Rate 50   Low Rate 8   Pulmonary Toilet   Pulmonary Toilet H. O.B elevated;Cough and deep breath

## 2021-01-15 NOTE — PROGRESS NOTES
Went back to see patient. Still borderline. Change inhalers to nebs  Will need to goto ICU when bed available. Told staff to get new saturation probe  Staff reports wife and daughter came to see patient today since at risk for needing vent.        Thad Reynoso MD

## 2021-01-15 NOTE — PROGRESS NOTES
TRANSFER - IN REPORT:    Verbal report received from [address] (name) on Homer Saeed  being received from [phone] (unit) for change in patient condition(NEEDS ICU LVL OF CARE)      Report consisted of patients Situation, Background, Assessment and   Recommendations(SBAR). Information from the following report(s) SBAR, Kardex, ED Summary, Intake/Output, MAR, Recent Results, Med Rec Status, Cardiac Rhythm NSR W/ PVC'S , Alarm Parameters  and Quality Measures was reviewed with the receiving nurse. Opportunity for questions and clarification was provided. Assessment completed upon patients arrival to unit and care assumed.

## 2021-01-15 NOTE — PROGRESS NOTES
Patient at rest with HR jumping to 160s and sustaining in the 120s. Per monitor room patient is sinus tach 121 with PACs. Notified Dr Neelam Gandhi md stated to notify if patient is sustaining in the 130s.

## 2021-01-15 NOTE — PROGRESS NOTES
Fani Slice  Admission Date: 12/31/2020             Daily Progress Note: 1/15/2021    The patient's chart is reviewed and the patient is discussed with the staff. 73yo WM with history of CAD s/p stenting, ischemic CM, PAD, COPD, DM2, HTN, HLD. Presented after fall at home, admitted 12/31 with acute respiratory failure due to covid. Family refused remdesivir and plasma. Started on dexamethasone (completed). Increasing O2 needs, up to CPAP with % FiO2. Subjective:     Patient was able to tolerate airvo + NRB some yesterday. However, last night began to desaturate even on this and was moved to CPAP. Currently on CPAP 10 with 75% Fio2 and maintaining sats in mid to low 90's. Hard to understand him with mask on but seems to indicate that his breathing feels ok with this on.      Current Facility-Administered Medications   Medication Dose Route Frequency    furosemide (LASIX) injection 20 mg  20 mg IntraVENous BID    loratadine (CLARITIN) tablet 10 mg  10 mg Oral DAILY    albuterol (PROVENTIL HFA, VENTOLIN HFA, PROAIR HFA) inhaler 2 Puff  2 Puff Inhalation TID RT    sodium chloride (NS) flush 5-40 mL  5-40 mL IntraVENous Q8H    sodium chloride (NS) flush 5-40 mL  5-40 mL IntraVENous PRN    acetaminophen (TYLENOL) tablet 650 mg  650 mg Oral Q6H PRN    Or    acetaminophen (TYLENOL) suppository 650 mg  650 mg Rectal Q6H PRN    polyethylene glycol (MIRALAX) packet 17 g  17 g Oral DAILY PRN    albuterol (PROVENTIL VENTOLIN) nebulizer solution 2.5 mg  2.5 mg Nebulization Q4H PRN    aspirin delayed-release tablet 81 mg  81 mg Oral QHS    clopidogreL (PLAVIX) tablet 75 mg  75 mg Oral DAILY    budesonide-formoteroL (SYMBICORT) 160-4.5 mcg/actuation HFA inhaler 2 Puff  2 Puff Inhalation BID RT    [Held by provider] hydroCHLOROthiazide (HYDRODIURIL) tablet 12.5 mg  12.5 mg Oral DAILY    montelukast (SINGULAIR) tablet 10 mg  10 mg Oral QHS    rosuvastatin (CRESTOR) tablet 10 mg 10 mg Oral QHS    insulin lispro (HUMALOG) injection   SubCUTAneous AC&HS    heparin (porcine) injection 5,000 Units  5,000 Units SubCUTAneous Q8H    [Held by provider] amLODIPine/valsartan (EXFORGE) 10/320 mg   Oral DAILY    magnesium oxide (MAG-OX) tablet 400 mg  400 mg Oral DAILY    0.9% sodium chloride infusion 250 mL  250 mL IntraVENous PRN       Review of Systems  Constitutional: negative for fever, chills, sweats  Cardiovascular: negative for chest pain, palpitations, syncope, edema  Gastrointestinal: negative for dysphagia, reflux, vomiting, diarrhea, abdominal pain, or melena  Neurologic: negative for focal weakness, numbness, headache    Objective:     Vitals:    01/15/21 0014 01/15/21 0323 01/15/21 0800 01/15/21 0906   BP: (!) 108/57 113/64 121/69    Pulse: 92 81 82    Resp: 20 24     Temp: 98.8 °F (37.1 °C) 98 °F (36.7 °C) 98.1 °F (36.7 °C)    SpO2: 90% 92% 92% 92%   Weight:       Height:         Intake and Output:   No intake/output data recorded. No intake/output data recorded. Physical Exam:   Constitution:  the patient is well developed and in no acute distress  EENMT:  Sclera clear, pupils equal, oral mucosa moist  Respiratory: Mild B inspiratory crackles. Mild increased WOB. Cardiovascular:  RRR without M,G,R  Gastrointestinal: soft and non-tender; with positive bowel sounds. Musculoskeletal: warm without cyanosis. There is no lower leg edema.   Skin:  no jaundice or rashes, no wounds   Neurologic: no gross neuro deficits     Psychiatric:  alert and oriented x ppt    CHEST XRAY:   CXR Results  (Last 48 hours)    None        1/11      LAB  No lab exists for component: [place]   Recent Labs     01/15/21  0537 01/14/21  0957 01/13/21  0444 01/12/21  1054   WBC 16.2* 11.7* 12.3* 10.8   HGB 10.3* 9.9* 9.8* 10.9*   HCT 31.0* 30.0* 29.7* 32.9*    253 249 230     Recent Labs     01/15/21  0537 01/14/21  0957 01/13/21  0444    133* 132*   K 4.4 4.5 4.7   CL 98 97* 95*   CO2 31 33* 31 * 167* 145*   BUN 37* 31* 26*   CREA 1.04 0.82 0.99   MG  --  2.3  --    CA 8.8 8.3 8.6   PHOS  --  3.8*  --    ALB 1.9* 2.0* 2.1*     ABG:  No results found for: PH, PHI, PCO2, PCO2I, PO2, PO2I, HCO3, HCO3I, FIO2, FIO2I      MICRO    SARS-CoV-2 LAB Results  [facility] Test: No results found for: COV2NT   [facility] Test: No results found for: EDPR  [facility] Test: No components found for: [id number]           Assessment:  (Medical Decision Making)     Hospital Problems  Date Reviewed: 12/16/2020          Codes Class Noted POA    CAP (community acquired pneumonia) ICD-10-CM: J18.9  ICD-9-CM: 883  12/31/2020 Yes        Monomorphic ventricular tachycardia ([facility] ICD-10-CM: I47.2  ICD-9-CM: 427.1  12/31/2020 Yes        COVID-19 ICD-10-CM: U07.1  ICD-9-CM: 079.89  12/31/2020 Yes        Elevated troponin ICD-10-CM: R77.8  ICD-9-CM: 790.6  12/31/2020 Yes        PAD (peripheral artery disease) ([facility] ICD-10-CM: I73.9  ICD-9-CM: 443.9  11/10/2015 Yes        Hyperlipidemia ICD-10-CM: E78.5  ICD-9-CM: 272.4  11/10/2015 Yes        COPD (chronic obstructive pulmonary disease) ([facility] ICD-10-CM: J44.9  ICD-9-CM: 922  4/10/2015 Yes        * (Principal) Acute respiratory failure with hypoxia ([facility] ICD-10-CM: J96.01  ICD-9-CM: 518.81  10/23/2014 Yes        AGUSTIN (acute kidney injury) ([facility] (Chronic) ICD-10-CM: N17.9  ICD-9-CM: 584.9  9/15/2014 Yes        DM (diabetes mellitus) type II, controlled, with peripheral vascular disorder (HCC) (Chronic) ICD-10-CM: E11.51  ICD-9-CM: 250.70, 443.81  9/11/2014 Yes        Coronary artery disease involving native coronary artery of native heart without angina pectoris ICD-10-CM: I25.10  ICD-9-CM: 414.01  10/12/2011 Yes        Morbid obesity ([facility] (Chronic) ICD-10-CM: E66.01  ICD-9-CM: 278.01  10/12/2011 Yes              Patient with severe acute hypoxic respiratory failure due to covid pneumonia. Plan:  (Medical Decision Making)   -completed dexa  -refused remdesivir and plasma.    -encourage self proning.   -continue lasix. -based on patient's d-dimer level of 7.64 will change heparin to lovenox 0.5mg/kg q 12 (70mg q 12). Will check LE dopplers    -try to transfer to ICU setting if bed available.        Karen Molina MD

## 2021-01-16 ENCOUNTER — APPOINTMENT (OUTPATIENT)
Dept: GENERAL RADIOLOGY | Age: 76
DRG: 177 | End: 2021-01-16
Attending: INTERNAL MEDICINE
Payer: MEDICARE

## 2021-01-16 LAB
ALBUMIN SERPL-MCNC: 1.8 G/DL (ref 3.2–4.6)
ALBUMIN/GLOB SERPL: 0.4 {RATIO} (ref 1.2–3.5)
ALP SERPL-CCNC: 84 U/L (ref 50–136)
ALT SERPL-CCNC: 105 U/L (ref 12–65)
ANION GAP SERPL CALC-SCNC: 17 MMOL/L (ref 7–16)
AST SERPL-CCNC: 70 U/L (ref 15–37)
BASOPHILS # BLD: 0 K/UL (ref 0–0.2)
BASOPHILS NFR BLD: 0 % (ref 0–2)
BILIRUB SERPL-MCNC: 0.7 MG/DL (ref 0.2–1.1)
BUN SERPL-MCNC: 31 MG/DL (ref 8–23)
CALCIUM SERPL-MCNC: 8.6 MG/DL (ref 8.3–10.4)
CHLORIDE SERPL-SCNC: 96 MMOL/L (ref 98–107)
CO2 SERPL-SCNC: 23 MMOL/L (ref 21–32)
CREAT SERPL-MCNC: 0.83 MG/DL (ref 0.8–1.5)
DIFFERENTIAL METHOD BLD: ABNORMAL
EOSINOPHIL # BLD: 0 K/UL (ref 0–0.8)
EOSINOPHIL NFR BLD: 0 % (ref 0.5–7.8)
ERYTHROCYTE [DISTWIDTH] IN BLOOD BY AUTOMATED COUNT: 14.3 % (ref 11.9–14.6)
GLOBULIN SER CALC-MCNC: 4.3 G/DL (ref 2.3–3.5)
GLUCOSE BLD STRIP.AUTO-MCNC: 177 MG/DL (ref 65–100)
GLUCOSE BLD STRIP.AUTO-MCNC: 202 MG/DL (ref 65–100)
GLUCOSE BLD STRIP.AUTO-MCNC: 206 MG/DL (ref 65–100)
GLUCOSE BLD STRIP.AUTO-MCNC: 227 MG/DL (ref 65–100)
GLUCOSE SERPL-MCNC: 173 MG/DL (ref 65–100)
HCT VFR BLD AUTO: 28.4 % (ref 41.1–50.3)
HGB BLD-MCNC: 9.6 G/DL (ref 13.6–17.2)
IMM GRANULOCYTES # BLD AUTO: 0.2 K/UL (ref 0–0.5)
IMM GRANULOCYTES NFR BLD AUTO: 1 % (ref 0–5)
LYMPHOCYTES # BLD: 0.7 K/UL (ref 0.5–4.6)
LYMPHOCYTES NFR BLD: 6 % (ref 13–44)
MCH RBC QN AUTO: 32.3 PG (ref 26.1–32.9)
MCHC RBC AUTO-ENTMCNC: 33.8 G/DL (ref 31.4–35)
MCV RBC AUTO: 95.6 FL (ref 79.6–97.8)
MONOCYTES # BLD: 0.6 K/UL (ref 0.1–1.3)
MONOCYTES NFR BLD: 6 % (ref 4–12)
NEUTS SEG # BLD: 9.4 K/UL (ref 1.7–8.2)
NEUTS SEG NFR BLD: 86 % (ref 43–78)
NRBC # BLD: 0 K/UL (ref 0–0.2)
PLATELET # BLD AUTO: 229 K/UL (ref 150–450)
PMV BLD AUTO: 10.8 FL (ref 9.4–12.3)
POTASSIUM SERPL-SCNC: 4.1 MMOL/L (ref 3.5–5.1)
PROT SERPL-MCNC: 6.1 G/DL (ref 6.3–8.2)
RBC # BLD AUTO: 2.97 M/UL (ref 4.23–5.6)
SODIUM SERPL-SCNC: 136 MMOL/L (ref 138–145)
WBC # BLD AUTO: 11 K/UL (ref 4.3–11.1)

## 2021-01-16 PROCEDURE — 82962 GLUCOSE BLOOD TEST: CPT

## 2021-01-16 PROCEDURE — 99233 SBSQ HOSP IP/OBS HIGH 50: CPT | Performed by: INTERNAL MEDICINE

## 2021-01-16 PROCEDURE — 94762 N-INVAS EAR/PLS OXIMTRY CONT: CPT

## 2021-01-16 PROCEDURE — 74011250637 HC RX REV CODE- 250/637: Performed by: INTERNAL MEDICINE

## 2021-01-16 PROCEDURE — 77030013140 HC MSK NEB VYRM -A

## 2021-01-16 PROCEDURE — 80053 COMPREHEN METABOLIC PANEL: CPT

## 2021-01-16 PROCEDURE — 2709999900 HC NON-CHARGEABLE SUPPLY

## 2021-01-16 PROCEDURE — 74011636637 HC RX REV CODE- 636/637: Performed by: INTERNAL MEDICINE

## 2021-01-16 PROCEDURE — 65610000006 HC RM INTENSIVE CARE

## 2021-01-16 PROCEDURE — 97530 THERAPEUTIC ACTIVITIES: CPT

## 2021-01-16 PROCEDURE — 77030040829 HC CATH EXT URINE MDII -B

## 2021-01-16 PROCEDURE — 94760 N-INVAS EAR/PLS OXIMETRY 1: CPT

## 2021-01-16 PROCEDURE — 85025 COMPLETE CBC W/AUTO DIFF WBC: CPT

## 2021-01-16 PROCEDURE — 74011250636 HC RX REV CODE- 250/636: Performed by: INTERNAL MEDICINE

## 2021-01-16 PROCEDURE — 94640 AIRWAY INHALATION TREATMENT: CPT

## 2021-01-16 PROCEDURE — 71045 X-RAY EXAM CHEST 1 VIEW: CPT

## 2021-01-16 PROCEDURE — 77030021668 HC NEB PREFIL KT VYRM -A

## 2021-01-16 PROCEDURE — 74011250636 HC RX REV CODE- 250/636: Performed by: ANESTHESIOLOGY

## 2021-01-16 PROCEDURE — 97110 THERAPEUTIC EXERCISES: CPT

## 2021-01-16 PROCEDURE — 74011000250 HC RX REV CODE- 250: Performed by: INTERNAL MEDICINE

## 2021-01-16 PROCEDURE — 77010033711 HC HIGH FLOW OXYGEN

## 2021-01-16 RX ORDER — GUAIFENESIN 600 MG/1
1200 TABLET, EXTENDED RELEASE ORAL EVERY 12 HOURS
Status: DISCONTINUED | OUTPATIENT
Start: 2021-01-16 | End: 2021-01-26 | Stop reason: HOSPADM

## 2021-01-16 RX ADMIN — BUDESONIDE 500 MCG: 0.5 INHALANT RESPIRATORY (INHALATION) at 08:59

## 2021-01-16 RX ADMIN — FUROSEMIDE 20 MG: 10 INJECTION INTRAMUSCULAR; INTRAVENOUS at 18:00

## 2021-01-16 RX ADMIN — ENOXAPARIN SODIUM 70 MG: 80 INJECTION SUBCUTANEOUS at 22:01

## 2021-01-16 RX ADMIN — FUROSEMIDE 20 MG: 10 INJECTION INTRAMUSCULAR; INTRAVENOUS at 09:00

## 2021-01-16 RX ADMIN — ALBUTEROL SULFATE 2.5 MG: 2.5 SOLUTION RESPIRATORY (INHALATION) at 22:38

## 2021-01-16 RX ADMIN — INSULIN LISPRO 4 UNITS: 100 INJECTION, SOLUTION INTRAVENOUS; SUBCUTANEOUS at 16:30

## 2021-01-16 RX ADMIN — INSULIN LISPRO 2 UNITS: 100 INJECTION, SOLUTION INTRAVENOUS; SUBCUTANEOUS at 07:30

## 2021-01-16 RX ADMIN — ALBUTEROL SULFATE 2.5 MG: 2.5 SOLUTION RESPIRATORY (INHALATION) at 08:59

## 2021-01-16 RX ADMIN — ROSUVASTATIN 10 MG: 10 TABLET, FILM COATED ORAL at 21:22

## 2021-01-16 RX ADMIN — ALBUTEROL SULFATE 2.5 MG: 2.5 SOLUTION RESPIRATORY (INHALATION) at 14:36

## 2021-01-16 RX ADMIN — CLOPIDOGREL BISULFATE 75 MG: 75 TABLET ORAL at 09:00

## 2021-01-16 RX ADMIN — INSULIN LISPRO 4 UNITS: 100 INJECTION, SOLUTION INTRAVENOUS; SUBCUTANEOUS at 11:30

## 2021-01-16 RX ADMIN — ENOXAPARIN SODIUM 70 MG: 80 INJECTION SUBCUTANEOUS at 11:00

## 2021-01-16 RX ADMIN — MAGNESIUM GLUCONATE 500 MG ORAL TABLET 400 MG: 500 TABLET ORAL at 09:00

## 2021-01-16 RX ADMIN — Medication 10 ML: at 21:22

## 2021-01-16 RX ADMIN — BUDESONIDE 500 MCG: 0.5 INHALANT RESPIRATORY (INHALATION) at 22:38

## 2021-01-16 RX ADMIN — INSULIN LISPRO 4 UNITS: 100 INJECTION, SOLUTION INTRAVENOUS; SUBCUTANEOUS at 21:21

## 2021-01-16 RX ADMIN — GUAIFENESIN 1200 MG: 600 TABLET ORAL at 21:22

## 2021-01-16 RX ADMIN — Medication 10 ML: at 05:27

## 2021-01-16 RX ADMIN — GUAIFENESIN 1200 MG: 600 TABLET ORAL at 12:00

## 2021-01-16 RX ADMIN — ASPIRIN 81 MG: 81 TABLET, COATED ORAL at 21:21

## 2021-01-16 RX ADMIN — LORATADINE 10 MG: 10 TABLET ORAL at 09:00

## 2021-01-16 RX ADMIN — Medication 20 ML: at 14:00

## 2021-01-16 RX ADMIN — MONTELUKAST SODIUM 10 MG: 10 TABLET, FILM COATED ORAL at 21:46

## 2021-01-16 NOTE — PROGRESS NOTES
Progress Note  Date:1/16/2021       Room:[facility]  Patient Name:Denny Faulkner     YOB: 1945     Age:75 y.o. Subjective    Subjective   68 yo CM with past history of CAD s/p stents, ischemic cardiomyopathy, PAD (on Plavix and cilostazo), COPD, DM type II, HTN, HLD presents via EMS after fall at home and was admitted due to acute respiratory failure due to COVID and had an episode of VTach. Family has refused for remdesivir and convalescent plasma. Patient oxygen requirement has been worsening. Initially he was on 40 to 60 L airvo BiPAP at night. Now for the past 2 days patient is on CPAP 75 - 100%. Unable to wean him to the OptiFlow. Pulmonology consulted and following. 1/16: Moved to ICU yesterday for anticipated intubation but now improving. Patient alert and oriented x3. On CPAP 70 %, saturations 98%. More alert today. No leukocytosis today. Pulmonology following. Chest x ray remains unchanged. Review of Systems   Constitutional:  denies fever, No chills, night sweats, weight loss, weight gain  Eyes:  Denies problems with eye pain, erythema, blurred vision, or visual field loss. ENTM:  Denies sore throat, deniesloss of taste or smell  Lymph:  Denies swollen glands. Cardiac:  No chest pain, pressure, discomfort, palpitations, orthopnea, murmurs, or edema. GI:  No dysphagia, heartburn reflux, nausea/vomiting, diarrhea, abdominal pain, or bleeding. :Denies history of dysuria, hematuria, polyuria, or decreased urine output. MS:  No history of myalgias, some LBP, no muscle cramps. Skin:  No history of rashes, jaundice, cyanosis, nodules, or ulcers. Endo:  Negative for heat or cold intolerance. No polyuria/polydipsia  Psych:  Mild anxiety, No depression, insomnia, hallucinations. Neuro:  Denies AMS, persistent headache, decreased level of consciousness,or motor or sensory deficits.   Objective         Vitals Last 24 Hours:  TEMPERATURE:  Temp Av.7 °F (37.1 °C)  Min: 98.1 °F (36.7 °C)  Max: 99.4 °F (37.4 °C)  RESPIRATIONS RANGE: Resp  Av.7  Min: 22  Max: 26  PULSE OXIMETRY RANGE: SpO2  Av.7 %  Min: 92 %  Max: 100 %  PULSE RANGE: Pulse  Av.6  Min: 77  Max: 101  BLOOD PRESSURE RANGE: Systolic (43HOC), CKF:515 , Min:83 , PQY:072   ; Diastolic (10PGE), MQL:25, Min:44, Max:69    I/O (24Hr): Intake/Output Summary (Last 24 hours) at 2021 0845  Last data filed at 1/15/2021 1730  Gross per 24 hour   Intake 0 ml   Output    Net 0 ml     Objective:  Vital signs: (most recent): Blood pressure 113/60, pulse 77, temperature 99.4 °F (37.4 °C), resp. rate 26, height 6' 1\" (1.854 m), weight 131.1 kg (289 lb), SpO2 99 %. GENERAL: alert, cooperative, moderate resp distress, appears stated age  EYE: conjunctivae/corneas clear. PERRL. THROAT & NECK: normal and no erythema or exudates noted. LUNG: Breath sounds bilateral, bibasilar crackles, more clear today. HEART: regular rate and rhythm, S1S2, no murmur, no JVD  ABDOMEN: soft, non-tender, non-distended. Bowel sounds normal.   EXTREMITIES:  No edema, 2+ pedal/radial pulses bilaterally  SKIN: no rash or abnormalities  NEUROLOGIC: A&Ox3. Cranial nerves 2-12 grossly intact. Labs/Imaging/Diagnostics    Labs:  CBC:  Recent Labs     01/16/21  0336 01/15/21  0537 21  0957   WBC 11.0 16.2* 11.7*   RBC 2.97* 3.23* 3.06*   HGB 9.6* 10.3* 9.9*   HCT 28.4* 31.0* 30.0*   MCV 95.6 96.0 98.0*   RDW 14.3 14.2 14.6    240 253     CHEMISTRIES:  Recent Labs     21  0336 01/15/21  0537 21  0957   * 136 133*   K 4.1 4.4 4.5   CL 96* 98 97*   CO2 23 31 33*   BUN 31* 37* 31*   CA 8.6 8.8 8.3   PHOS  --   --  3.8*   MG  --   --  2.3   PT/INR:No results for input(s): INR, INREXT, INREXT in the last 72 hours. No lab exists for component: PROTIME  APTT:No results for input(s): APTT in the last 72 hours.   LIVER PROFILE:  Recent Labs     21  0336 01/15/21  0537 01/14/21  0957   AST 70* 49* 36   * 74* 50     Lab Results   Component Value Date/Time    ALT (SGPT) 105 (H) 01/16/2021 03:36 AM    AST (SGOT) 70 (H) 01/16/2021 03:36 AM    Alk. phosphatase 84 01/16/2021 03:36 AM    Bilirubin, direct <0.1 11/24/2014 05:00 AM    Bilirubin, total 0.7 01/16/2021 03:36 AM       Imaging Last 24 Hours:  Xr Chest Sngl V    Result Date: 1/16/2021  EXAM: Chest x-ray. INDICATION: Dyspnea. Covid 19. COMPARISON: January 11, 2021. TECHNIQUE: Frontal view chest x-ray. FINDINGS: There has been no significant change in cardiomegaly and bilateral lung infiltrates. No pneumothorax or pleural effusion is seen. IMPRESSION: Unchanged bilateral lung infiltrates. Duplex Lower Ext Venous Bilat    Result Date: 1/15/2021  EXAM: Bilateral  lower extremity venous ultrasound. INDICATION:  Elevated d-dimer. Covid 19 Comparison: None. Doppler ultrasound of both lower extremities was performed. FINDINGS: There is normal flow in the common femoral, superficial femoral, and popliteal veins. Normal compression and augmentation is demonstrated. The proximal calf veins are also patent. IMPRESSION: 1. No evidence of deep venous thrombosis in either lower extremity.      Assessment//Plan   Principal Problem:    Acute respiratory failure with hypoxia (HCC) (10/23/2014)    Active Problems:    Coronary artery disease involving native coronary artery of native heart without angina pectoris (10/12/2011)      Morbid obesity ([address] (10/12/2011)      DM (diabetes mellitus) type II, controlled, with peripheral vascular disorder ([address] (9/11/2014)      AGUSTIN (acute kidney injury) ([address] (9/15/2014)      COPD (chronic obstructive pulmonary disease) ([address] (4/10/2015)      PAD (peripheral artery disease) ([address] (11/10/2015)      Hyperlipidemia (11/10/2015)      CAP (community acquired pneumonia) (12/31/2020)      Monomorphic ventricular tachycardia ([address] (12/31/2020)      COVID-19 (12/31/2020)      Elevated troponin (12/31/2020)      Assessment & Plan    Acute respiratory failure with hypoxia secondary to COVID-19 infection:  - Pulmonology following, difficult to wean patient, -transferred to ICU 1/15, now improving on CPAP  - Completed Decadron  - Refused Remdesivir and convalescent plasma initially  - Continue home aerosols  - Completed antibiotics for possible CAP  - Wean oxygen as appropriate  - Continue Lasix       Monomorphic ventricular tachycardia:  - Patient found to have monomorphic VT with EMS, converted on his own  - Given Mag in ER  - Cardiology saw him in ER and recommended correcting electrolytes  - Start Amio if it happens again  - Cardiology signed off, didn't write a note?       Bacteremia due to gram positive bacteria:  - Initial blood cultures 12/23 with strep + staph but both common contaminants  - Repeat cultures NGTD   - If persistent, will need ECHO       AGUSTIN:  - Resolved       Elevated troponin:  - Troponin 72 --> 425 --> 391  - No acute CP  - ?COVID related vs VT  - Repeat troponin until trending down  - Continue ASA + Plavix  - Cardiology assess in ER(?) -->  Previous attending has spoken to them and they think it's COVID       DM (diabetes mellitus) type II:  - A1C on 12/16/20 was 6.8  - Blood sugars in 300s, patient is on steroids   - Increase Lantus to 35 units   - continue Humalog SSI       Coronary artery disease:  - Stable  - Continue ASA + Plavix  - Continue Crestor       COPD:  - No acute wheezing  - Continue home aerosols       PAD:  - Continue ASA + Plavix       Hyperlipidemia:   - Continue Crestor    Electronically signed by Aparna Nichols NP on 1/16/2021 at 10:17 AM

## 2021-01-16 NOTE — PROGRESS NOTES
Bedside, Verbal and Written shift change report given to LINDA RN  (oncoming nurse) by Ivanna Cartwright RN  (offgoing nurse).  Report included the following information SBAR, Kardex, ED Summary, Procedure Summary, Intake/Output, MAR, Recent Results, Med Rec Status, Cardiac Rhythm NSR W/ PVC , Alarm Parameters  and Quality Measures     PT A/O X3 FOLLOWING COMMANDS     CPAP ALL HS @ 75% WILL TRY FOR AIRVO THIS AM

## 2021-01-16 NOTE — PROGRESS NOTES
Griselda Mcneal  Admission Date: 12/31/2020             Daily Progress Note: 1/16/2021    The patient's chart is reviewed and the patient is discussed with the staff. 73yo WM with history of CAD s/p stenting, ischemic CM, PAD, COPD, DM2, HTN, HLD. Presented after fall at home, admitted 12/31 with acute respiratory failure due to covid. Family refused remdesivir and plasma. Started on dexamethasone (completed). Increasing O2 needs, up to CPAP with % FiO2. Subjective:     Did use CPAP last night and now off. Coughed up thick white secretions and saturation 100% on max Airvo. Did diurese but was initially in diapers then had condom catherization that failed and now with boone.      Current Facility-Administered Medications   Medication Dose Route Frequency    enoxaparin (LOVENOX) injection 70 mg  70 mg SubCUTAneous Q12H    albuterol (PROVENTIL VENTOLIN) nebulizer solution 2.5 mg  2.5 mg Nebulization QID RT    budesonide (PULMICORT) 500 mcg/2 ml nebulizer suspension  500 mcg Nebulization BID RT    furosemide (LASIX) injection 20 mg  20 mg IntraVENous BID    loratadine (CLARITIN) tablet 10 mg  10 mg Oral DAILY    sodium chloride (NS) flush 5-40 mL  5-40 mL IntraVENous Q8H    sodium chloride (NS) flush 5-40 mL  5-40 mL IntraVENous PRN    acetaminophen (TYLENOL) tablet 650 mg  650 mg Oral Q6H PRN    Or    acetaminophen (TYLENOL) suppository 650 mg  650 mg Rectal Q6H PRN    polyethylene glycol (MIRALAX) packet 17 g  17 g Oral DAILY PRN    albuterol (PROVENTIL VENTOLIN) nebulizer solution 2.5 mg  2.5 mg Nebulization Q4H PRN    aspirin delayed-release tablet 81 mg  81 mg Oral QHS    clopidogreL (PLAVIX) tablet 75 mg  75 mg Oral DAILY    [Held by provider] hydroCHLOROthiazide (HYDRODIURIL) tablet 12.5 mg  12.5 mg Oral DAILY    montelukast (SINGULAIR) tablet 10 mg  10 mg Oral QHS    rosuvastatin (CRESTOR) tablet 10 mg  10 mg Oral QHS    insulin lispro (HUMALOG) injection SubCUTAneous AC&HS    [Held by provider] amLODIPine/valsartan (EXFORGE) 10/320 mg   Oral DAILY    magnesium oxide (MAG-OX) tablet 400 mg  400 mg Oral DAILY    0.9% sodium chloride infusion 250 mL  250 mL IntraVENous PRN       Review of Systems  Constitutional: negative for fever, chills, sweats  Cardiovascular: negative for chest pain, palpitations, syncope, edema  Gastrointestinal: negative for dysphagia, reflux, vomiting, diarrhea, abdominal pain, or melena  Neurologic: negative for focal weakness, numbness, headache    Objective:     Vitals:    01/16/21 0953 01/16/21 1000 01/16/21 1011 01/16/21 1030   BP:  (!) 123/57     Pulse:  93 88 87   Resp:       Temp:       SpO2: 90%  98% 100%   Weight:       Height:         Intake and Output:   No intake/output data recorded. No intake/output data recorded. Physical Exam:   Constitution:  the patient is well developed and in no acute distress  EENMT:  Sclera clear, pupils equal, oral mucosa moist  Respiratory: coarse but improved aeration on Airvo at 60 L and 100%  Cardiovascular:  RRR without M,G,R  Gastrointestinal: soft and non-tender; with positive bowel sounds. Musculoskeletal: warm without cyanosis. There is no lower leg edema.   Skin:  no jaundice or rashes, no wounds   Neurologic: no gross neuro deficits     Psychiatric:  alert and oriented x ppt    CHEST XRAY:  1/16 -- b/l infiltrates with no change since prior study              LAB  No lab exists for component: [place]   Recent Labs     01/16/21  0336 01/15/21  0537 01/14/21  0957   WBC 11.0 16.2* 11.7*   HGB 9.6* 10.3* 9.9*   HCT 28.4* 31.0* 30.0*    240 253     Recent Labs     01/16/21  0336 01/15/21  0537 01/14/21  0957   * 136 133*   K 4.1 4.4 4.5   CL 96* 98 97*   CO2 23 31 33*   * 209* 167*   BUN 31* 37* 31*   CREA 0.83 1.04 0.82   MG  --   --  2.3   CA 8.6 8.8 8.3   PHOS  --   --  3.8*   ALB 1.8* 1.9* 2.0*     ABG:  No results found for: PH, PHI, PCO2, PCO2I, PO2, PO2I, HCO3, HCO3I, FIO2, FIO2I      MICRO    SARS-CoV-2 LAB Results  [facility] Test: No results found for: COV2NT   [facility] Test: No results found for: EDPR  [facility] Test: No components found for: [id number]           Assessment:  (Medical Decision Making)     Hospital Problems  Date Reviewed: 12/16/2020          Codes Class Noted POA    CAP (community acquired pneumonia) ICD-10-CM: J18.9  ICD-9-CM: 486  12/31/2020 Yes        Monomorphic ventricular tachycardia ([facility] ICD-10-CM: I47.2  ICD-9-CM: 427.1  12/31/2020 Yes        COVID-19 ICD-10-CM: U07.1  ICD-9-CM: 079.89  12/31/2020 Yes        Elevated troponin ICD-10-CM: R77.8  ICD-9-CM: 790.6  12/31/2020 Yes        PAD (peripheral artery disease) ([facility] ICD-10-CM: I73.9  ICD-9-CM: 443.9  11/10/2015 Yes        Hyperlipidemia ICD-10-CM: E78.5  ICD-9-CM: 272.4  11/10/2015 Yes        COPD (chronic obstructive pulmonary disease) ([facility] ICD-10-CM: J44.9  ICD-9-CM: 309  4/10/2015 Yes        * (Principal) Acute respiratory failure with hypoxia ([facility] ICD-10-CM: J96.01  ICD-9-CM: 518.81  10/23/2014 Yes        AGUSTIN (acute kidney injury) ([facility] (Chronic) ICD-10-CM: N17.9  ICD-9-CM: 584.9  9/15/2014 Yes        DM (diabetes mellitus) type II, controlled, with peripheral vascular disorder ([facility] (Chronic) ICD-10-CM: E11.51  ICD-9-CM: 250.70, 443.81  9/11/2014 Yes        Coronary artery disease involving native coronary artery of native heart without angina pectoris ICD-10-CM: I25.10  ICD-9-CM: 414.01  10/12/2011 Yes        Morbid obesity ([facility] (Chronic) ICD-10-CM: E66.01  ICD-9-CM: 278.01  10/12/2011 Yes              Patient with severe acute hypoxic respiratory failure due to covid pneumonia on Airvo to CPAP    Plan:  (Medical Decision Making)   --continue CPAP with sleep and when tired. Taper FIO2 on airvo on max of 60 L and 100% at this time.  Continue to taper as tolerated  --self proning if possible or at least side to side  --continue nebs  --add mucinex and flutter valve  --For COVID 19 s/p dexa but refused remdesivir and plasma.   --continue lasix -- has boone now -- keep negative balance  --continue lovenex 70 q12. Dopplers of leg negative  --PUD prophylaxis  --if doing better tomorrow may be able to get out of ICU      Wilner Lopez MD

## 2021-01-16 NOTE — PROGRESS NOTES
ACUTE PHYSICAL THERAPY GOALS:  (Developed with and agreed upon by patient and/or caregiver.)  STG:  (1.)Mr. Enrique Navarro will move from supine to sit and sit to supine , scoot up and down and roll side to side with CONTACT GUARD ASSIST within 3 treatment day(s). (2.)Mr. Enrique Navarro will transfer from bed to chair and chair to bed with CONTACT GUARD ASSIST using the least restrictive device within 3 treatment day(s). (3.)Mr. Enrique Navarro will ambulate with CONTACT GUARD ASSIST for 40 feet with the least restrictive device within 3 treatment day(s). (4.)Mr. Enrique Navarro will perform standing static and dynamic balance activities x 10 minutes with CONTACT GUARD ASSIST to improve safety within 3 treatment day(s). (5.)Mr. Enrique Navarro will maintain stable vital signs throughout all functional mobility within 3 treatment days.     LTG:  (1.)Mr. Enrique Navarro will move from supine to sit and sit to supine , scoot up and down and roll side to side in bed with SUPERVISION within 7 treatment day(s). (2.)Mr. Enrique Navarro will transfer from bed to chair and chair to bed with SUPERVISION using the least restrictive device within 7 treatment day(s). (3.)Mr. Enrique Navarro will ambulate with SUPERVISION for 100+ feet with the least restrictive device within 7 treatment day(s). (4.)Mr. Enrique Navarro will perform standing static and dynamic balance activities x 15 minutes with SUPERVISION to improve safety within 7 treatment day(s). (5.)Mr. Enrique Navarro will ambulate and/or perform functional activities for 15 consecutive minutes with stable vital signs and no rests required to improve activity tolerance within 7 treatment days.   ________________________________________________________________________________________________    PHYSICAL THERAPY: Daily Note and PM Treatment Day # 5    Yulissa Franco is a 67 y.o. male   PRIMARY DIAGNOSIS: Acute respiratory failure with hypoxia ([facility]  CAP (community acquired pneumonia) [J18.9]         ASSESSMENT:     REHAB RECOMMENDATIONS: CURRENT LEVEL OF FUNCTION:  (Most Recently Demonstrated)   Recommendation to date pending progress:  Setting:   Short-term Rehab   Equipment:    Rolling Walker Bed Mobility:   Moderate Assistance  Sit to Stand:  [employer] Assistance  Transfers:   Not tested  Gait/Mobility:   Not tested     ASSESSMENT:  Mr. Willa Elliott presents in ICU now but has been able to wean back to Airvo (maxed out)  He sat up on the EOB with moderate assist with sats dropping into the low 80's to get there. After a few minutes he improved and sats were % sitting EOB performing exercises. He stood EOB and took a couple steps along bed to move up higher with CGA and lied himself back down with sats dropping again. Seems like he has made some improvement although very tentative. SUBJECTIVE:   Mr. Willa Elliott states, \"I want some water. \"    SOCIAL HISTORY/ LIVING ENVIRONMENT: Patient lives in a 2 story home (basement) on the main level with his spouse. His son lives \"200 feet from me. \" He typically is independent with ambulation/ADLs and able to perform yard work at baseline, however does admit to balance challenges even at baseline and experience a fall prior to admission.    Home Environment: Private residence  # Steps to Enter: 1  One/Two Story Residence: Two story, live on 1st floor  Living Alone: No  Support Systems: Spouse/Significant Other/Partner, Child(dileep)  OBJECTIVE:     PAIN: VITAL SIGNS: LINES/DRAINS:   Pre Treatment: Pain Screen  Pain Scale 1: FLACC  Pain Intensity 1: 0  Post Treatment: 0/10     O2 Device: Heated, Hi flow nasal cannula     MOBILITY: I Mod I S SBA CGA Min Mod Max Total  NT x2 Comments:   Bed Mobility    Rolling [] [] [] [] [] [] [] [] [] [] []    Supine to Sit [] [] [] [] [] [] [x] [] [] [] []    Scooting [] [] [] [x] [] [] [] [] [] [] []    Sit to Supine [] [] [] [x] [] [] [] [] [] [] []    Transfers    Sit to Stand [] [] [] [] [] [x] [] [] [] [] []    Bed to Chair [] [] [] [] [] [] [] [] [] [x] [] Stand to Sit [] [] [] [] [x] [] [] [] [] [] []    I=Independent, Mod I=Modified Independent, S=Supervision, SBA=Standby Assistance, CGA=Contact Guard Assistance,   Min=Minimal Assistance, Mod=Moderate Assistance, Max=Maximal Assistance, Total=Total Assistance, NT=Not Tested    GAIT: I Mod I S SBA CGA Min Mod Max Total  NT x2 Comments:   Level of Assistance [] [] [] [] [] [] [] [] [] [x] []    Distance NA    DME N/A    Gait Quality n/a    I=Independent, Mod I=Modified Independent, S=Supervision, SBA=Standby Assistance, CGA=Contact Guard Assistance,   Min=Minimal Assistance, Mod=Moderate Assistance, Max=Maximal Assistance, Total=Total Assistance, NT=Not Tested    PLAN:   FREQUENCY/DURATION: PT Plan of Care: 3 times/week for duration of hospital stay or until stated goals are met, whichever comes first.  TREATMENT:     TREATMENT:   ($$ Therapeutic Activity: 23-37 mins  $$ Therapeutic Exercises: 8-22 mins    )    Therapeutic Exercise: (  15 minutes):  Exercises per grid below to improve mobility, strength and endurance. Required minimal visual and verbal cues to promote proper body mechanics. Progressed complexity of movement as indicated. Therapeutic Activity: (    30 minutes): Therapeutic activities including Bed transfers, sitting EOB and standing to take a few steps along EOB to improve mobility, strength and balance. Required minimal   to promote static and dynamic balance in standing.           Date:  01/07/21 Date:  1/10/21 Date:  1/13/21 Date  1/16/21   Activity/Exercise Parameters Parameters Parameters    Ankle pumps x10 B A 10x B 10x B 10x B   LAQ x10 B A 20x B 10x B 20x B   Seated marching x10 B A 20x B  10x B   Seated hip abd  20x B 10x B    Calf raises  10x B in walker     Sit to stand  5x     Standing hip abd  10x B     Standing marching  10x B       AFTER TREATMENT POSITION/PRECAUTIONS:  Bed, Needs within reach and RN notified    INTERDISCIPLINARY COLLABORATION:  RN/PCT and PT/PTA    TOTAL TREATMENT DURATION:  PT Patient Time In/Time Out  Time In: 1445  Time Out: 1530    Emmie Hodgkins, PTA

## 2021-01-16 NOTE — PROGRESS NOTES
Pt was on Airvo since this morning. See Flow-sheets. Patient placed on CPAP on previous settings. Tolerating well at this time. Will continue to monitor.

## 2021-01-16 NOTE — PROGRESS NOTES
Patient ambulated to [facility] with 2x RN     No bowel movement / pt just passing a lot of gas / feels like he has to go     Pt returned back to bed with some assistance

## 2021-01-16 NOTE — PROGRESS NOTES
Patient placed on airvo 60/ 100    Pt educated on how to use yancker and encouraged to cough up phlegm     Placed pt in seated position O2 SAT 95%         --------------    1020    Condom cath pulled off/ patient excoriated in groin area with skin break down     New orders of boone placed

## 2021-01-17 ENCOUNTER — APPOINTMENT (OUTPATIENT)
Dept: ULTRASOUND IMAGING | Age: 76
DRG: 177 | End: 2021-01-17
Attending: NURSE PRACTITIONER
Payer: MEDICARE

## 2021-01-17 LAB
ALBUMIN SERPL-MCNC: 1.8 G/DL (ref 3.2–4.6)
ALBUMIN/GLOB SERPL: 0.5 {RATIO} (ref 1.2–3.5)
ALP SERPL-CCNC: 88 U/L (ref 50–136)
ALT SERPL-CCNC: 159 U/L (ref 12–65)
ANION GAP SERPL CALC-SCNC: 4 MMOL/L (ref 7–16)
AST SERPL-CCNC: 103 U/L (ref 15–37)
BASOPHILS # BLD: 0 K/UL (ref 0–0.2)
BASOPHILS NFR BLD: 0 % (ref 0–2)
BILIRUB SERPL-MCNC: 0.6 MG/DL (ref 0.2–1.1)
BUN SERPL-MCNC: 26 MG/DL (ref 8–23)
CALCIUM SERPL-MCNC: 8.3 MG/DL (ref 8.3–10.4)
CHLORIDE SERPL-SCNC: 96 MMOL/L (ref 98–107)
CO2 SERPL-SCNC: 34 MMOL/L (ref 21–32)
CREAT SERPL-MCNC: 0.87 MG/DL (ref 0.8–1.5)
DIFFERENTIAL METHOD BLD: ABNORMAL
EOSINOPHIL # BLD: 0 K/UL (ref 0–0.8)
EOSINOPHIL NFR BLD: 0 % (ref 0.5–7.8)
ERYTHROCYTE [DISTWIDTH] IN BLOOD BY AUTOMATED COUNT: 14.4 % (ref 11.9–14.6)
GLOBULIN SER CALC-MCNC: 4 G/DL (ref 2.3–3.5)
GLUCOSE BLD STRIP.AUTO-MCNC: 241 MG/DL (ref 65–100)
GLUCOSE BLD STRIP.AUTO-MCNC: 254 MG/DL (ref 65–100)
GLUCOSE BLD STRIP.AUTO-MCNC: 312 MG/DL (ref 65–100)
GLUCOSE BLD STRIP.AUTO-MCNC: 322 MG/DL (ref 65–100)
GLUCOSE SERPL-MCNC: 235 MG/DL (ref 65–100)
HCT VFR BLD AUTO: 28 % (ref 41.1–50.3)
HGB BLD-MCNC: 9.3 G/DL (ref 13.6–17.2)
IMM GRANULOCYTES # BLD AUTO: 0.2 K/UL (ref 0–0.5)
IMM GRANULOCYTES NFR BLD AUTO: 2 % (ref 0–5)
LYMPHOCYTES # BLD: 0.7 K/UL (ref 0.5–4.6)
LYMPHOCYTES NFR BLD: 7 % (ref 13–44)
MCH RBC QN AUTO: 32.3 PG (ref 26.1–32.9)
MCHC RBC AUTO-ENTMCNC: 33.2 G/DL (ref 31.4–35)
MCV RBC AUTO: 97.2 FL (ref 79.6–97.8)
MONOCYTES # BLD: 0.6 K/UL (ref 0.1–1.3)
MONOCYTES NFR BLD: 6 % (ref 4–12)
NEUTS SEG # BLD: 8.3 K/UL (ref 1.7–8.2)
NEUTS SEG NFR BLD: 85 % (ref 43–78)
NRBC # BLD: 0 K/UL (ref 0–0.2)
PLATELET # BLD AUTO: 234 K/UL (ref 150–450)
PMV BLD AUTO: 10.5 FL (ref 9.4–12.3)
POTASSIUM SERPL-SCNC: 4.6 MMOL/L (ref 3.5–5.1)
PROT SERPL-MCNC: 5.8 G/DL (ref 6.3–8.2)
RBC # BLD AUTO: 2.88 M/UL (ref 4.23–5.6)
SODIUM SERPL-SCNC: 134 MMOL/L (ref 136–145)
WBC # BLD AUTO: 9.8 K/UL (ref 4.3–11.1)

## 2021-01-17 PROCEDURE — 74011250637 HC RX REV CODE- 250/637: Performed by: INTERNAL MEDICINE

## 2021-01-17 PROCEDURE — 65660000000 HC RM CCU STEPDOWN

## 2021-01-17 PROCEDURE — 99233 SBSQ HOSP IP/OBS HIGH 50: CPT | Performed by: INTERNAL MEDICINE

## 2021-01-17 PROCEDURE — 74011636637 HC RX REV CODE- 636/637: Performed by: INTERNAL MEDICINE

## 2021-01-17 PROCEDURE — 76705 ECHO EXAM OF ABDOMEN: CPT

## 2021-01-17 PROCEDURE — 80053 COMPREHEN METABOLIC PANEL: CPT

## 2021-01-17 PROCEDURE — 77010033711 HC HIGH FLOW OXYGEN

## 2021-01-17 PROCEDURE — 74011250636 HC RX REV CODE- 250/636: Performed by: INTERNAL MEDICINE

## 2021-01-17 PROCEDURE — 74011250636 HC RX REV CODE- 250/636: Performed by: ANESTHESIOLOGY

## 2021-01-17 PROCEDURE — 82962 GLUCOSE BLOOD TEST: CPT

## 2021-01-17 PROCEDURE — 74011000250 HC RX REV CODE- 250: Performed by: INTERNAL MEDICINE

## 2021-01-17 PROCEDURE — 94660 CPAP INITIATION&MGMT: CPT

## 2021-01-17 PROCEDURE — 77030040393 HC DRSG OPTIFOAM GENT MDII -B

## 2021-01-17 PROCEDURE — 2709999900 HC NON-CHARGEABLE SUPPLY

## 2021-01-17 PROCEDURE — 94640 AIRWAY INHALATION TREATMENT: CPT

## 2021-01-17 PROCEDURE — 85025 COMPLETE CBC W/AUTO DIFF WBC: CPT

## 2021-01-17 PROCEDURE — 36415 COLL VENOUS BLD VENIPUNCTURE: CPT

## 2021-01-17 PROCEDURE — 94760 N-INVAS EAR/PLS OXIMETRY 1: CPT

## 2021-01-17 RX ADMIN — LORATADINE 10 MG: 10 TABLET ORAL at 09:00

## 2021-01-17 RX ADMIN — BUDESONIDE 500 MCG: 0.5 INHALANT RESPIRATORY (INHALATION) at 20:08

## 2021-01-17 RX ADMIN — Medication 10 ML: at 22:22

## 2021-01-17 RX ADMIN — FUROSEMIDE 20 MG: 10 INJECTION INTRAMUSCULAR; INTRAVENOUS at 18:00

## 2021-01-17 RX ADMIN — ENOXAPARIN SODIUM 70 MG: 80 INJECTION SUBCUTANEOUS at 11:00

## 2021-01-17 RX ADMIN — GUAIFENESIN 1200 MG: 600 TABLET ORAL at 20:45

## 2021-01-17 RX ADMIN — INSULIN LISPRO 8 UNITS: 100 INJECTION, SOLUTION INTRAVENOUS; SUBCUTANEOUS at 11:30

## 2021-01-17 RX ADMIN — ALBUTEROL SULFATE 2.5 MG: 2.5 SOLUTION RESPIRATORY (INHALATION) at 20:07

## 2021-01-17 RX ADMIN — INSULIN LISPRO 8 UNITS: 100 INJECTION, SOLUTION INTRAVENOUS; SUBCUTANEOUS at 22:22

## 2021-01-17 RX ADMIN — CLOPIDOGREL BISULFATE 75 MG: 75 TABLET ORAL at 09:00

## 2021-01-17 RX ADMIN — ALBUTEROL SULFATE 2.5 MG: 2.5 SOLUTION RESPIRATORY (INHALATION) at 14:42

## 2021-01-17 RX ADMIN — INSULIN LISPRO 6 UNITS: 100 INJECTION, SOLUTION INTRAVENOUS; SUBCUTANEOUS at 16:30

## 2021-01-17 RX ADMIN — MONTELUKAST SODIUM 10 MG: 10 TABLET, FILM COATED ORAL at 22:22

## 2021-01-17 RX ADMIN — ASPIRIN 81 MG: 81 TABLET, COATED ORAL at 20:45

## 2021-01-17 RX ADMIN — Medication 20 ML: at 14:00

## 2021-01-17 RX ADMIN — MAGNESIUM GLUCONATE 500 MG ORAL TABLET 400 MG: 500 TABLET ORAL at 09:00

## 2021-01-17 RX ADMIN — ROSUVASTATIN 10 MG: 10 TABLET, FILM COATED ORAL at 22:22

## 2021-01-17 RX ADMIN — ALBUTEROL SULFATE 2.5 MG: 2.5 SOLUTION RESPIRATORY (INHALATION) at 08:47

## 2021-01-17 RX ADMIN — FUROSEMIDE 20 MG: 10 INJECTION INTRAMUSCULAR; INTRAVENOUS at 09:00

## 2021-01-17 RX ADMIN — ENOXAPARIN SODIUM 70 MG: 80 INJECTION SUBCUTANEOUS at 20:45

## 2021-01-17 RX ADMIN — BUDESONIDE 500 MCG: 0.5 INHALANT RESPIRATORY (INHALATION) at 08:47

## 2021-01-17 RX ADMIN — INSULIN LISPRO 4 UNITS: 100 INJECTION, SOLUTION INTRAVENOUS; SUBCUTANEOUS at 07:30

## 2021-01-17 RX ADMIN — GUAIFENESIN 1200 MG: 600 TABLET ORAL at 09:00

## 2021-01-17 NOTE — PROGRESS NOTES
Bedside, Verbal and Written shift change report given to LINDA RN  (oncoming nurse) by Leigh Ann Joseph RN  (offgoing nurse). Report included the following information SBAR, Kardex, ED Summary, Procedure Summary, Intake/Output, MAR, Recent Results, Med Rec Status, Cardiac Rhythm NSR WITH PVC, Alarm Parameters  and Quality Measures     PT A/O X3 FOLLOWING COMMANDS     NO NEW EVENTS OVER LAST NIGHT // CPAP ALL HS @ 75%     WILL TRY AIRVO AGAIN THIS AM AND ATTEMPT TO WEAN AS TOLERATED     .

## 2021-01-17 NOTE — PROGRESS NOTES
Dorita Christina  Admission Date: 12/31/2020             Daily Progress Note: 1/17/2021    The patient's chart is reviewed and the patient is discussed with the staff. 75yo WM with history of CAD s/p stenting, ischemic CM, PAD, COPD, DM2, HTN, HLD. Presented after fall at home, admitted 12/31 with acute respiratory failure due to covid. Family refused remdesivir and plasma. Started on dexamethasone (completed). Increasing O2 needs, up to CPAP with % FiO2. Subjective:     Did use CPAP last night and now off. On airvo atn 60 L and 89% and saturation at 94-96%. Coughing up secretion. Feels stronger.      Current Facility-Administered Medications   Medication Dose Route Frequency    guaiFENesin ER (MUCINEX) tablet 1,200 mg  1,200 mg Oral Q12H    enoxaparin (LOVENOX) injection 70 mg  70 mg SubCUTAneous Q12H    albuterol (PROVENTIL VENTOLIN) nebulizer solution 2.5 mg  2.5 mg Nebulization QID RT    budesonide (PULMICORT) 500 mcg/2 ml nebulizer suspension  500 mcg Nebulization BID RT    furosemide (LASIX) injection 20 mg  20 mg IntraVENous BID    loratadine (CLARITIN) tablet 10 mg  10 mg Oral DAILY    sodium chloride (NS) flush 5-40 mL  5-40 mL IntraVENous Q8H    sodium chloride (NS) flush 5-40 mL  5-40 mL IntraVENous PRN    acetaminophen (TYLENOL) tablet 650 mg  650 mg Oral Q6H PRN    Or    acetaminophen (TYLENOL) suppository 650 mg  650 mg Rectal Q6H PRN    polyethylene glycol (MIRALAX) packet 17 g  17 g Oral DAILY PRN    albuterol (PROVENTIL VENTOLIN) nebulizer solution 2.5 mg  2.5 mg Nebulization Q4H PRN    aspirin delayed-release tablet 81 mg  81 mg Oral QHS    clopidogreL (PLAVIX) tablet 75 mg  75 mg Oral DAILY    [Held by provider] hydroCHLOROthiazide (HYDRODIURIL) tablet 12.5 mg  12.5 mg Oral DAILY    montelukast (SINGULAIR) tablet 10 mg  10 mg Oral QHS    rosuvastatin (CRESTOR) tablet 10 mg  10 mg Oral QHS    insulin lispro (HUMALOG) injection   SubCUTAneous AC&HS    [Held by provider] amLODIPine/valsartan (EXFORGE) 10/320 mg   Oral DAILY    magnesium oxide (MAG-OX) tablet 400 mg  400 mg Oral DAILY    0.9% sodium chloride infusion 250 mL  250 mL IntraVENous PRN       Review of Systems  Constitutional: negative for fever, chills, sweats  Cardiovascular: negative for chest pain, palpitations, syncope, edema  Gastrointestinal: negative for dysphagia, reflux, vomiting, diarrhea, abdominal pain, or melena  Neurologic: negative for focal weakness, numbness, headache    Objective:     Vitals:    01/17/21 1107 01/17/21 1130 01/17/21 1159 01/17/21 1200   BP:   132/76    Pulse: 84 89 95 93   Resp: 20      Temp: 98.9 °F (37.2 °C)      SpO2: 100% 100% 100% 100%   Weight:       Height:         Intake and Output:   01/15 1901 - 01/17 0700  In: 350 [P.O.:350]  Out: 1700 [Urine:1700]  01/17 0701 - 01/17 1900  In: 450 [P.O.:450]  Out: -     Physical Exam:   Constitution:  the patient is well developed and in no acute distress  EENMT:  Sclera clear, pupils equal, oral mucosa moist  Respiratory: b/l rhonchi today. On Airvo at 60 L and 195%  Cardiovascular:  RRR without M,G,R  Gastrointestinal: soft and non-tender; with positive bowel sounds. Musculoskeletal: warm without cyanosis. There is no lower leg edema.   Skin:  no jaundice or rashes, no wounds   Neurologic: no gross neuro deficits     Psychiatric:  alert and oriented x ppt    CHEST XRAY:  1/16 -- b/l infiltrates with no change since prior study              LAB  No lab exists for component: [place]   Recent Labs     01/17/21 0424 01/16/21  0336 01/15/21  0537   WBC 9.8 11.0 16.2*   HGB 9.3* 9.6* 10.3*   HCT 28.0* 28.4* 31.0*    229 240     Recent Labs     01/17/21  0424 01/16/21  0336 01/15/21  0537   * 136* 136   K 4.6 4.1 4.4   CL 96* 96* 98   CO2 34* 23 31   * 173* 209*   BUN 26* 31* 37*   CREA 0.87 0.83 1.04   CA 8.3 8.6 8.8   ALB 1.8* 1.8* 1.9*     ABG:  No results found for: PH, PHI, PCO2, PCO2I, PO2, PO2I, HCO3, HCO3I, FIO2, FIO2I      MICRO    SARS-CoV-2 LAB Results  [facility] Test: No results found for: COV2NT   [facility] Test: No results found for: EDPR  [facility] Test: No components found for: [id number]           Assessment:  (Medical Decision Making)     Hospital Problems  Date Reviewed: 12/16/2020          Codes Class Noted POA    CAP (community acquired pneumonia) ICD-10-CM: J18.9  ICD-9-CM: 486  12/31/2020 Yes        Monomorphic ventricular tachycardia ([facility] ICD-10-CM: I47.2  ICD-9-CM: 427.1  12/31/2020 Yes        COVID-19 ICD-10-CM: U07.1  ICD-9-CM: 079.89  12/31/2020 Yes        Elevated troponin ICD-10-CM: R77.8  ICD-9-CM: 790.6  12/31/2020 Yes        PAD (peripheral artery disease) ([facility] ICD-10-CM: I73.9  ICD-9-CM: 443.9  11/10/2015 Yes        Hyperlipidemia ICD-10-CM: E78.5  ICD-9-CM: 272.4  11/10/2015 Yes        COPD (chronic obstructive pulmonary disease) ([facility] ICD-10-CM: J44.9  ICD-9-CM: 627  4/10/2015 Yes        * (Principal) Acute respiratory failure with hypoxia ([facility] ICD-10-CM: J96.01  ICD-9-CM: 518.81  10/23/2014 Yes        AGUSTIN (acute kidney injury) ([facility] (Chronic) ICD-10-CM: N17.9  ICD-9-CM: 584.9  9/15/2014 Yes        DM (diabetes mellitus) type II, controlled, with peripheral vascular disorder ([facility] (Chronic) ICD-10-CM: E11.51  ICD-9-CM: 250.70, 443.81  9/11/2014 Yes        Coronary artery disease involving native coronary artery of native heart without angina pectoris ICD-10-CM: I25.10  ICD-9-CM: 414.01  10/12/2011 Yes        Morbid obesity ([facility] (Chronic) ICD-10-CM: E66.01  ICD-9-CM: 278.01  10/12/2011 Yes              Patient with severe acute hypoxic respiratory failure due to covid pneumonia on Airvo to CPAP    Plan:  (Medical Decision Making)   --continue CPAP with sleep and when tired. Taper FIO2 on airvo on down to 60 L and 90% and continue as tolerated  --self proning if possible or at least side to side  --continue nebs  --continue mucinex and flutter valve with increased rhonchi today. --For COVID 19 s/p dexa but refused remdesivir and plasma. --continue lasix -- has boone now -- keep negative balance down 1.350 L since yesterday. --continue lovenex 70 q12. Dopplers of leg negative  --PUD prophylaxis    Transfer out of ICU today.  Will follow on floor      Neelima MD Raphael

## 2021-01-17 NOTE — PROGRESS NOTES
Progress Note  Date:2021       Room:[facility]  Patient Name:Denny Faulkner     YOB: 1945     Age:75 y.o. Subjective    Subjective   68 yo CM with past history of CAD s/p stents, ischemic cardiomyopathy, PAD (on Plavix and cilostazo), COPD, DM type II, HTN, HLD presents via EMS after fall at home and was admitted due to acute respiratory failure due to COVID and had an episode of VTach. Family has refused for remdesivir and convalescent plasma. Patient oxygen requirement has been worsening. Initially he was on 40 to 60 L airvo BiPAP at night. Now for the past 2 days patient is on CPAP 75 - 100%. Unable to wean him to the OptiFlow. Pulmonology consulted and following. :  Remains in ICU today. Patient alert and oriented x3. On CPAP 60 %, saturations 98%. More alert today. No leukocytosis today. Pulmonology following. Review of Systems   Constitutional:  denies fever, No chills, night sweats, weight loss, weight gain  Eyes:  Denies problems with eye pain, erythema, blurred vision, or visual field loss. ENTM:  Denies sore throat, deniesloss of taste or smell  Lymph:  Denies swollen glands. Cardiac:  No chest pain, pressure, discomfort, palpitations, orthopnea, murmurs, or edema. GI:  No dysphagia, heartburn reflux, nausea/vomiting, diarrhea, abdominal pain, or bleeding. :Denies history of dysuria, hematuria, polyuria, or decreased urine output. MS:  No history of myalgias, some LBP, no muscle cramps. Skin:  No history of rashes, jaundice, cyanosis, nodules, or ulcers. Endo:  Negative for heat or cold intolerance. No polyuria/polydipsia  Psych:  Mild anxiety, No depression, insomnia, hallucinations. Neuro:  Denies AMS, persistent headache, decreased level of consciousness,or motor or sensory deficits.   Objective         Vitals Last 24 Hours:  TEMPERATURE:  Temp  Av.6 °F (37 °C)  Min: 98 °F (36.7 °C)  Max: 99.1 °F (37.3 °C)  RESPIRATIONS RANGE: Resp  Av.3  Min: 20  Max: 22  PULSE OXIMETRY RANGE: SpO2  Av.2 %  Min: 87 %  Max: 100 %  PULSE RANGE: Pulse  Av.9  Min: 76  Max: 112  BLOOD PRESSURE RANGE: Systolic (94GLQ), FZM:025 , Min:107 , MZJ:236   ; Diastolic (14FQN), RWH:67, Min:55, Max:68    I/O (24Hr): Intake/Output Summary (Last 24 hours) at 2021 1021  Last data filed at 2021 1000  Gross per 24 hour   Intake 800 ml   Output 1700 ml   Net -900 ml     Objective:  Vital signs: (most recent): Blood pressure (!) 113/59, pulse 91, temperature 98.8 °F (37.1 °C), resp. rate 22, height 6' 1\" (1.854 m), weight 131.1 kg (289 lb), SpO2 95 %. GENERAL: alert, cooperative, moderate resp distress, appears stated age  EYE: conjunctivae/corneas clear. PERRL. THROAT & NECK: normal and no erythema or exudates noted. LUNG: Breath sounds bilateral, bibasilar crackles, more clear today. HEART: regular rate and rhythm, S1S2, no murmur, no JVD  ABDOMEN: soft, non-tender, non-distended. Bowel sounds normal.   EXTREMITIES:  No edema, 2+ pedal/radial pulses bilaterally  SKIN: no rash or abnormalities  NEUROLOGIC: A&Ox3. Cranial nerves 2-12 grossly intact. Labs/Imaging/Diagnostics    Labs:  CBC:  Recent Labs     21  0336 01/15/21  0537   WBC 9.8 11.0 16.2*   RBC 2.88* 2.97* 3.23*   HGB 9.3* 9.6* 10.3*   HCT 28.0* 28.4* 31.0*   MCV 97.2 95.6 96.0   RDW 14.4 14.3 14.2    229 240     CHEMISTRIES:  Recent Labs     21  0336 01/15/21  0537   * 136* 136   K 4.6 4.1 4.4   CL 96* 96* 98   CO2 34* 23 31   BUN 26* 31* 37*   CA 8.3 8.6 8.8   PT/INR:No results for input(s): INR, INREXT, INREXT in the last 72 hours. No lab exists for component: PROTIME  APTT:No results for input(s): APTT in the last 72 hours.   LIVER PROFILE:  Recent Labs     21  0424 21  0336 01/15/21  0537   * 70* 49*   * 105* 74*     Lab Results   Component Value Date/Time    ALT (SGPT) 159 (H) 01/17/2021 04:24 AM    AST (SGOT) 103 (H) 01/17/2021 04:24 AM    Alk. phosphatase 88 01/17/2021 04:24 AM    Bilirubin, direct <0.1 11/24/2014 05:00 AM    Bilirubin, total 0.6 01/17/2021 04:24 AM       Imaging Last 24 Hours:  No results found.   Assessment//Plan   Principal Problem:    Acute respiratory failure with hypoxia ([facility] (10/23/2014)    Active Problems:    Coronary artery disease involving native coronary artery of native heart without angina pectoris (10/12/2011)      Morbid obesity ([address] (10/12/2011)      DM (diabetes mellitus) type II, controlled, with peripheral vascular disorder ([address] (9/11/2014)      AGUSTIN (acute kidney injury) ([facility] (9/15/2014)      COPD (chronic obstructive pulmonary disease) ([facility] (4/10/2015)      PAD (peripheral artery disease) ([facility] (11/10/2015)      Hyperlipidemia (11/10/2015)      CAP (community acquired pneumonia) (12/31/2020)      Monomorphic ventricular tachycardia ([facility] (12/31/2020)      COVID-19 (12/31/2020)      Elevated troponin (12/31/2020)      Assessment & Plan    Acute respiratory failure with hypoxia secondary to COVID-19 infection (Dx 12/31):  - Pulmonology following, difficult to wean patient, -transferred to ICU 1/15, now improving on CPAP  - Completed Decadron  - Refused Remdesivir and convalescent plasma   - Continue home aerosols  - Completed antibiotics for possible CAP  - Wean oxygen as appropriate  - Continue Lasix       Monomorphic ventricular tachycardia:  - Patient found to have monomorphic VT with EMS, converted on his own  - Given Mag in ER  - Cardiology saw him in ER and recommended correcting electrolytes  - Start Amio if it happens again  - Cardiology signed off, didn't write a note?       Bacteremia due to gram positive bacteria:  - Initial blood cultures 12/23 with strep + staph but both common contaminants  - Repeat cultures NGTD   - If persistent, will need ECHO       AGUSTIN:  - Resolved       Elevated troponin:  - Troponin 72 --> 425 --> 391  - No acute CP  - ?COVID related vs VT  - Repeat troponin until trending down  - Continue ASA + Plavix  - Cardiology assess in ER(?) -->  Previous attending has spoken to them and they think it's COVID       DM (diabetes mellitus) type II:  - A1C on 12/16/20 was 6.8  - Blood sugars in 300s, patient is on steroids   - Increase Lantus to 35 units   - continue Humalog SSI       Coronary artery disease:  - Stable  - Continue ASA + Plavix  - Continue Crestor       COPD:  - No acute wheezing  - Continue home aerosols       PAD:  - Continue ASA + Plavix       Hyperlipidemia:   - Continue Crestor       Elevated LFTs:  -abdominal U/S  -trend    Electronically signed by Shimon Shell NP on 1/17/2021 at 10:17 AM

## 2021-01-17 NOTE — PROGRESS NOTES
Patient arrived to room 817 via bed. Placed on Airvo 60L /100%. Food tray davide with patient removed by RN. Patient to  remain NPO until abdominal ultrasound.

## 2021-01-18 ENCOUNTER — APPOINTMENT (OUTPATIENT)
Dept: GENERAL RADIOLOGY | Age: 76
DRG: 177 | End: 2021-01-18
Attending: INTERNAL MEDICINE
Payer: MEDICARE

## 2021-01-18 LAB
ALBUMIN SERPL-MCNC: 1.8 G/DL (ref 3.2–4.6)
ALBUMIN/GLOB SERPL: 0.4 {RATIO} (ref 1.2–3.5)
ALP SERPL-CCNC: 91 U/L (ref 50–136)
ALT SERPL-CCNC: 170 U/L (ref 12–65)
ANION GAP SERPL CALC-SCNC: 4 MMOL/L (ref 7–16)
AST SERPL-CCNC: 75 U/L (ref 15–37)
BASOPHILS # BLD: 0 K/UL (ref 0–0.2)
BASOPHILS NFR BLD: 1 % (ref 0–2)
BILIRUB SERPL-MCNC: 0.6 MG/DL (ref 0.2–1.1)
BUN SERPL-MCNC: 22 MG/DL (ref 8–23)
CALCIUM SERPL-MCNC: 8.8 MG/DL (ref 8.3–10.4)
CHLORIDE SERPL-SCNC: 95 MMOL/L (ref 98–107)
CO2 SERPL-SCNC: 35 MMOL/L (ref 21–32)
CREAT SERPL-MCNC: 0.91 MG/DL (ref 0.8–1.5)
DIFFERENTIAL METHOD BLD: ABNORMAL
EOSINOPHIL # BLD: 0 K/UL (ref 0–0.8)
EOSINOPHIL NFR BLD: 0 % (ref 0.5–7.8)
ERYTHROCYTE [DISTWIDTH] IN BLOOD BY AUTOMATED COUNT: 14 % (ref 11.9–14.6)
GLOBULIN SER CALC-MCNC: 4.6 G/DL (ref 2.3–3.5)
GLUCOSE BLD STRIP.AUTO-MCNC: 233 MG/DL (ref 65–100)
GLUCOSE BLD STRIP.AUTO-MCNC: 239 MG/DL (ref 65–100)
GLUCOSE BLD STRIP.AUTO-MCNC: 265 MG/DL (ref 65–100)
GLUCOSE BLD STRIP.AUTO-MCNC: 272 MG/DL (ref 65–100)
GLUCOSE SERPL-MCNC: 233 MG/DL (ref 65–100)
HCT VFR BLD AUTO: 27.4 % (ref 41.1–50.3)
HGB BLD-MCNC: 9 G/DL (ref 13.6–17.2)
IMM GRANULOCYTES # BLD AUTO: 0.1 K/UL (ref 0–0.5)
IMM GRANULOCYTES NFR BLD AUTO: 1 % (ref 0–5)
LYMPHOCYTES # BLD: 0.6 K/UL (ref 0.5–4.6)
LYMPHOCYTES NFR BLD: 7 % (ref 13–44)
MCH RBC QN AUTO: 31.9 PG (ref 26.1–32.9)
MCHC RBC AUTO-ENTMCNC: 32.8 G/DL (ref 31.4–35)
MCV RBC AUTO: 97.2 FL (ref 79.6–97.8)
MONOCYTES # BLD: 0.7 K/UL (ref 0.1–1.3)
MONOCYTES NFR BLD: 8 % (ref 4–12)
NEUTS SEG # BLD: 7.3 K/UL (ref 1.7–8.2)
NEUTS SEG NFR BLD: 82 % (ref 43–78)
NRBC # BLD: 0 K/UL (ref 0–0.2)
PLATELET # BLD AUTO: 242 K/UL (ref 150–450)
PMV BLD AUTO: 10.7 FL (ref 9.4–12.3)
POTASSIUM SERPL-SCNC: 4 MMOL/L (ref 3.5–5.1)
PROT SERPL-MCNC: 6.4 G/DL (ref 6.3–8.2)
RBC # BLD AUTO: 2.82 M/UL (ref 4.23–5.6)
SODIUM SERPL-SCNC: 134 MMOL/L (ref 136–145)
WBC # BLD AUTO: 8.8 K/UL (ref 4.3–11.1)

## 2021-01-18 PROCEDURE — 77010033711 HC HIGH FLOW OXYGEN

## 2021-01-18 PROCEDURE — 74011636637 HC RX REV CODE- 636/637: Performed by: INTERNAL MEDICINE

## 2021-01-18 PROCEDURE — 74011250637 HC RX REV CODE- 250/637: Performed by: INTERNAL MEDICINE

## 2021-01-18 PROCEDURE — 74011250636 HC RX REV CODE- 250/636: Performed by: INTERNAL MEDICINE

## 2021-01-18 PROCEDURE — 2709999900 HC NON-CHARGEABLE SUPPLY

## 2021-01-18 PROCEDURE — 94760 N-INVAS EAR/PLS OXIMETRY 1: CPT

## 2021-01-18 PROCEDURE — 85025 COMPLETE CBC W/AUTO DIFF WBC: CPT

## 2021-01-18 PROCEDURE — 82962 GLUCOSE BLOOD TEST: CPT

## 2021-01-18 PROCEDURE — 94640 AIRWAY INHALATION TREATMENT: CPT

## 2021-01-18 PROCEDURE — 94762 N-INVAS EAR/PLS OXIMTRY CONT: CPT

## 2021-01-18 PROCEDURE — 65270000029 HC RM PRIVATE

## 2021-01-18 PROCEDURE — 74011250636 HC RX REV CODE- 250/636: Performed by: FAMILY MEDICINE

## 2021-01-18 PROCEDURE — 71045 X-RAY EXAM CHEST 1 VIEW: CPT

## 2021-01-18 PROCEDURE — 74011000250 HC RX REV CODE- 250: Performed by: INTERNAL MEDICINE

## 2021-01-18 PROCEDURE — 99233 SBSQ HOSP IP/OBS HIGH 50: CPT | Performed by: INTERNAL MEDICINE

## 2021-01-18 PROCEDURE — 80053 COMPREHEN METABOLIC PANEL: CPT

## 2021-01-18 PROCEDURE — 94660 CPAP INITIATION&MGMT: CPT

## 2021-01-18 PROCEDURE — 77030021668 HC NEB PREFIL KT VYRM -A

## 2021-01-18 RX ORDER — LORAZEPAM 2 MG/ML
1 INJECTION INTRAMUSCULAR ONCE
Status: COMPLETED | OUTPATIENT
Start: 2021-01-18 | End: 2021-01-18

## 2021-01-18 RX ADMIN — ASPIRIN 81 MG: 81 TABLET, COATED ORAL at 21:21

## 2021-01-18 RX ADMIN — ROSUVASTATIN 10 MG: 10 TABLET, FILM COATED ORAL at 21:22

## 2021-01-18 RX ADMIN — ENOXAPARIN SODIUM 70 MG: 80 INJECTION SUBCUTANEOUS at 11:24

## 2021-01-18 RX ADMIN — MAGNESIUM GLUCONATE 500 MG ORAL TABLET 400 MG: 500 TABLET ORAL at 08:25

## 2021-01-18 RX ADMIN — Medication 5 ML: at 06:11

## 2021-01-18 RX ADMIN — ALBUTEROL SULFATE 2.5 MG: 2.5 SOLUTION RESPIRATORY (INHALATION) at 08:22

## 2021-01-18 RX ADMIN — GUAIFENESIN 1200 MG: 600 TABLET ORAL at 21:21

## 2021-01-18 RX ADMIN — LORATADINE 10 MG: 10 TABLET ORAL at 08:25

## 2021-01-18 RX ADMIN — BUDESONIDE 500 MCG: 0.5 INHALANT RESPIRATORY (INHALATION) at 08:22

## 2021-01-18 RX ADMIN — GUAIFENESIN 1200 MG: 600 TABLET ORAL at 08:25

## 2021-01-18 RX ADMIN — ALBUTEROL SULFATE 2.5 MG: 2.5 SOLUTION RESPIRATORY (INHALATION) at 20:26

## 2021-01-18 RX ADMIN — INSULIN LISPRO 4 UNITS: 100 INJECTION, SOLUTION INTRAVENOUS; SUBCUTANEOUS at 11:28

## 2021-01-18 RX ADMIN — MONTELUKAST SODIUM 10 MG: 10 TABLET, FILM COATED ORAL at 21:21

## 2021-01-18 RX ADMIN — Medication 10 ML: at 21:22

## 2021-01-18 RX ADMIN — CLOPIDOGREL BISULFATE 75 MG: 75 TABLET ORAL at 08:25

## 2021-01-18 RX ADMIN — Medication 10 ML: at 13:27

## 2021-01-18 RX ADMIN — INSULIN LISPRO 6 UNITS: 100 INJECTION, SOLUTION INTRAVENOUS; SUBCUTANEOUS at 06:11

## 2021-01-18 RX ADMIN — INSULIN LISPRO 4 UNITS: 100 INJECTION, SOLUTION INTRAVENOUS; SUBCUTANEOUS at 21:21

## 2021-01-18 RX ADMIN — LORAZEPAM 1 MG: 2 INJECTION INTRAMUSCULAR; INTRAVENOUS at 05:45

## 2021-01-18 RX ADMIN — FUROSEMIDE 20 MG: 10 INJECTION INTRAMUSCULAR; INTRAVENOUS at 17:33

## 2021-01-18 RX ADMIN — BUDESONIDE 500 MCG: 0.5 INHALANT RESPIRATORY (INHALATION) at 20:26

## 2021-01-18 RX ADMIN — ALBUTEROL SULFATE 2.5 MG: 2.5 SOLUTION RESPIRATORY (INHALATION) at 15:32

## 2021-01-18 RX ADMIN — FUROSEMIDE 20 MG: 10 INJECTION INTRAMUSCULAR; INTRAVENOUS at 08:25

## 2021-01-18 RX ADMIN — ALBUTEROL SULFATE 2.5 MG: 2.5 SOLUTION RESPIRATORY (INHALATION) at 11:37

## 2021-01-18 RX ADMIN — INSULIN LISPRO 6 UNITS: 100 INJECTION, SOLUTION INTRAVENOUS; SUBCUTANEOUS at 16:48

## 2021-01-18 NOTE — PROGRESS NOTES
Restraints have been removed at this time. Pt is alert and oriented times 3 and verbalizes that he understands not to take off the Airvo. Will continue to [facility].

## 2021-01-18 NOTE — DIABETES MGMT
Patient admitted with acute respiratory failure with hypoxia, positive for COVID-19. Blood glucose ranged 235-322 yesterday with patient receiving Humalog 26 units. Blood glucose this morning was 272. Reviewed patient current regimen: Humalog SSI. Updated provider via RxResults.  Provider could consider low dose basal insulin as fasting blood glucose is not at goal.

## 2021-01-18 NOTE — PROGRESS NOTES
Pt is resting in bed at this time. Pt is alert and oriented times 3. Pt is on Airvo 60L 100%. Pt states that he is not in any pain at this time. Pt has boone in place that is draining clear sanya urine. Pt has safety measures in place. Pt has call light within reach and is encouraged to call for assistance if needed. Will continue to monitor.

## 2021-01-18 NOTE — PROGRESS NOTES
Pt was placed on CPAP due to desaturation while on Airvo 60L 100% and a nonrebreather on. Pt continued to take off CPAP due to it being uncomfortable and would desat into the 60's. Pt has been placed back on Airvo 60L 100% with a non rebreather at bedside if needed. Restraints have been placed at this time. Will reassess restraints in two hours. Will continue to monitor.

## 2021-01-18 NOTE — PROGRESS NOTES
Patient assisted up to recliner. Tolerated well and maintained SaO2 greater than 92% on Airvo 60L/90%.

## 2021-01-18 NOTE — PROGRESS NOTES
Pt is resting in bed at this time. Pt is on Airvo 60L 100%. Pt is stating 93% at this time on continuous pulse ox. Safety measures in place.  Report given to [facility]

## 2021-01-18 NOTE — PROGRESS NOTES
Saadia July  Admission Date: 12/31/2020             Daily Progress Note: 1/18/2021    The patient's chart is reviewed and the patient is discussed with the staff. 73yo WM with history of CAD s/p stenting, ischemic CM, PAD, COPD, DM2, HTN, HLD. Presented after fall at home, admitted 12/31 with acute respiratory failure due to covid. Family refused remdesivir and plasma. Started on dexamethasone (completed). Increasing O2 needs, up to CPAP with % FiO2.      Subjective:     Transferred out of ICU yesterday, on AirVo 60L  Low grade temp 99.5 now  Used CPAP for a short time overnight  No change in CXR today    Current Facility-Administered Medications   Medication Dose Route Frequency    guaiFENesin ER (MUCINEX) tablet 1,200 mg  1,200 mg Oral Q12H    enoxaparin (LOVENOX) injection 70 mg  70 mg SubCUTAneous Q12H    albuterol (PROVENTIL VENTOLIN) nebulizer solution 2.5 mg  2.5 mg Nebulization QID RT    budesonide (PULMICORT) 500 mcg/2 ml nebulizer suspension  500 mcg Nebulization BID RT    furosemide (LASIX) injection 20 mg  20 mg IntraVENous BID    loratadine (CLARITIN) tablet 10 mg  10 mg Oral DAILY    sodium chloride (NS) flush 5-40 mL  5-40 mL IntraVENous Q8H    sodium chloride (NS) flush 5-40 mL  5-40 mL IntraVENous PRN    acetaminophen (TYLENOL) tablet 650 mg  650 mg Oral Q6H PRN    Or    acetaminophen (TYLENOL) suppository 650 mg  650 mg Rectal Q6H PRN    polyethylene glycol (MIRALAX) packet 17 g  17 g Oral DAILY PRN    albuterol (PROVENTIL VENTOLIN) nebulizer solution 2.5 mg  2.5 mg Nebulization Q4H PRN    aspirin delayed-release tablet 81 mg  81 mg Oral QHS    clopidogreL (PLAVIX) tablet 75 mg  75 mg Oral DAILY    [Held by provider] hydroCHLOROthiazide (HYDRODIURIL) tablet 12.5 mg  12.5 mg Oral DAILY    montelukast (SINGULAIR) tablet 10 mg  10 mg Oral QHS    rosuvastatin (CRESTOR) tablet 10 mg  10 mg Oral QHS    insulin lispro (HUMALOG) injection   SubCUTAneous AC&HS    [Held by provider] amLODIPine/valsartan (EXFORGE) 10/320 mg   Oral DAILY    magnesium oxide (MAG-OX) tablet 400 mg  400 mg Oral DAILY    0.9% sodium chloride infusion 250 mL  250 mL IntraVENous PRN       Review of Systems  Constitutional: negative for fever, chills, sweats  Cardiovascular: negative for chest pain, palpitations, syncope, edema  Gastrointestinal: negative for dysphagia, reflux, vomiting, diarrhea, abdominal pain, or melena  Neurologic: negative for focal weakness, numbness, headache    Objective:     Vitals:    01/18/21 1450 01/18/21 1452 01/18/21 1504 01/18/21 1532   BP:    110/60   Pulse:    82   Resp:    18   Temp:    99.5 °F (37.5 °C)   SpO2: 100% 98% 98% 99%   Weight:       Height:         Intake and Output:   01/16 1901 - 01/18 0700  In: 450 [P.O.:450]  Out: 2230 [Urine:2230]  01/18 0701 - 01/18 1900  In: 240 [P.O.:240]  Out: -     Physical Exam:   Constitution:  the patient is well developed and in no acute distress  EENMT:  Sclera clear, pupils equal, oral mucosa moist  Respiratory: On Airvo at 61 L   Cardiovascular:  RRR without M,G,R  Gastrointestinal: soft and non-tender; with positive bowel sounds. Musculoskeletal: warm without cyanosis. There is no lower leg edema.   Skin:  no jaundice or rashes, no wounds   Neurologic: no gross neuro deficits     Psychiatric:  alert and oriented       CHEST XRAY:  1/18/2020 1/16 -- b/l infiltrates with no change since prior study            LAB  No lab exists for component: [place]   Recent Labs     01/18/21 0531 01/17/21 0424 01/16/21  0336   WBC 8.8 9.8 11.0   HGB 9.0* 9.3* 9.6*   HCT 27.4* 28.0* 28.4*    234 229     Recent Labs     01/18/21  0531 01/17/21  0424 01/16/21  0336   * 134* 136*   K 4.0 4.6 4.1   CL 95* 96* 96*   CO2 35* 34* 23   * 235* 173*   BUN 22 26* 31*   CREA 0.91 0.87 0.83   CA 8.8 8.3 8.6   ALB 1.8* 1.8* 1.8*     ABG:  No results found for: PH, PHI, PCO2, PCO2I, PO2, PO2I, HCO3, HCO3I, FIO2, FIO2I      MICRO    SARS-CoV-2 LAB Results  [facility] Test: No results found for: COV2NT   [facility] Test: No results found for: EDPR  [facility] Test: No components found for: [id number]           Assessment:  (Medical Decision Making)     Hospital Problems  Date Reviewed: 1/18/2021          Codes Class Noted POA    CAP (community acquired pneumonia) ICD-10-CM: J18.9  ICD-9-CM: 486  12/31/2020 Yes        Monomorphic ventricular tachycardia ([facility] ICD-10-CM: I47.2  ICD-9-CM: 427.1  12/31/2020 Yes        COVID-19 ICD-10-CM: U07.1  ICD-9-CM: 079.89  12/31/2020 Yes        Elevated troponin ICD-10-CM: R77.8  ICD-9-CM: 790.6  12/31/2020 Yes        PAD (peripheral artery disease) ([facility] ICD-10-CM: I73.9  ICD-9-CM: 443.9  11/10/2015 Yes        Hyperlipidemia ICD-10-CM: E78.5  ICD-9-CM: 272.4  11/10/2015 Yes        COPD (chronic obstructive pulmonary disease) ([facility] ICD-10-CM: J44.9  ICD-9-CM: 176  4/10/2015 Yes        * (Principal) Acute respiratory failure with hypoxia ([facility] ICD-10-CM: J96.01  ICD-9-CM: 518.81  10/23/2014 Yes        AGUSTIN (acute kidney injury) ([facility] (Chronic) ICD-10-CM: N17.9  ICD-9-CM: 584.9  9/15/2014 Yes        DM (diabetes mellitus) type II, controlled, with peripheral vascular disorder ([facility] (Chronic) ICD-10-CM: E11.51  ICD-9-CM: 250.70, 443.81  9/11/2014 Yes        Coronary artery disease involving native coronary artery of native heart without angina pectoris ICD-10-CM: I25.10  ICD-9-CM: 414.01  10/12/2011 Yes        Morbid obesity ([facility] (Chronic) ICD-10-CM: E66.01  ICD-9-CM: 278.01  10/12/2011 Yes              Patient with severe acute hypoxic respiratory failure due to covid pneumonia on Airvo to CPAP    Plan:  (Medical Decision Making)     --On Airvo 60L, wean as tolerated  --Continue CPAP nightly and prn  --Self proning measures  --Continue albuterol, Pulmicort nebulizers  --Continue mucinex and flutter valve  --COVID 19: s/p dexamethasone, refused remdesivir and plasma.    --On Lasix 20 mg IV BID   --GI/DVT prophylaxis  --Full code          Amelia Lees NP     Lungs:  coarse  Heart:  RRR with no Murmur/Rubs/Gallops    Additional Comments: On 80 % airvo/60L ,continue a above, if less Fi02 tomorrow ,maybe Ok to sign off    I have spoken with and examined the patient. I agree with the above assessment and plan as documented.     Chidi Mcclain MD

## 2021-01-18 NOTE — PROGRESS NOTES
Patient repeatedly pulling off CPAP mask and desatting into 60s, despite redirection and education about oxygen necessity. Per primary RN, Theodore Hickey, patient is alert and oriented but is non-compliant with oxygen. Patient placed back on airvo 60L at 100% with NRB mask by primary RN, Theodore Hickey, to improve oxygen saturation. Dr. Venu Mcduffie notified of the above stated at 21 [phone] via [employer]. Dr. Florencia Kelley gave verbal orders for a one-time dose of ativan 1mg IV and bilateral soft wrist restraints for patient safety. Primary RN, Theodore Hickey, aware of all of the above stated.

## 2021-01-18 NOTE — PROGRESS NOTES
Progress Note  Date:1/18/2021       Room:[facility]  Patient Name:Denny Faulkner     YOB: 1945     Age:75 y.o. Subjective    Subjective   66 yo CM with past history of CAD s/p stents, ischemic cardiomyopathy, PAD (on Plavix and cilostazo), COPD, DM type II, HTN, HLD presents via EMS after fall at home and was admitted due to acute respiratory failure due to COVID and had an episode of VTach. Family has refused for remdesivir and convalescent plasma. Patient oxygen requirement has been worsening. Initially he was on 40 to 60 L airvo BiPAP at night. Now for the past 2 days patient is on CPAP 75 - 100%. Unable to wean him to the OptiFlow. Pulmonology consulted and following. 1/18: Out of  ICU today. Patient alert and oriented x3.  92% on Airvo 60L/90%. , More alert today. No leukocytosis today. Pulmonology following. Repeat chest x ray unchanged bilateral infiltrates. **Attempted to call [employer]. No answer. Review of Systems   Constitutional:  denies fever, No chills, night sweats, weight loss, weight gain  Eyes:  Denies problems with eye pain, erythema, blurred vision, or visual field loss. ENTM:  Denies sore throat, deniesloss of taste or smell  Lymph:  Denies swollen glands. Cardiac:  No chest pain, pressure, discomfort, palpitations, orthopnea, murmurs, or edema. GI:  No dysphagia, heartburn reflux, nausea/vomiting, diarrhea, abdominal pain, or bleeding. :Denies history of dysuria, hematuria, polyuria, or decreased urine output. MS:  No history of myalgias, some LBP, no muscle cramps. Skin:  No history of rashes, jaundice, cyanosis, nodules, or ulcers. Endo:  Negative for heat or cold intolerance. No polyuria/polydipsia  Psych:  Mild anxiety, No depression, insomnia, hallucinations. Neuro:  Denies AMS, persistent headache, decreased level of consciousness,or motor or sensory deficits.   Objective         Vitals Last 24 Hours:  TEMPERATURE:  Temp  Av.7 °F (37.1 °C)  Min: 98 °F (36.7 °C)  Max: 99.8 °F (37.7 °C)  RESPIRATIONS RANGE: Resp  Av.3  Min: 18  Max: 28  PULSE OXIMETRY RANGE: SpO2  Av.8 %  Min: 89 %  Max: 100 %  PULSE RANGE: Pulse  Av.7  Min: 51  Max: 109  BLOOD PRESSURE RANGE: Systolic (32OVA), JFL:394 , Min:106 , BIW:274   ; Diastolic (56GVO), NMT:03, Min:59, Max:67    I/O (24Hr): Intake/Output Summary (Last 24 hours) at 2021 1328  Last data filed at 2021 0917  Gross per 24 hour   Intake 240 ml   Output 1280 ml   Net -1040 ml     Objective:  Vital signs: (most recent): Blood pressure 109/67, pulse 74, temperature 98.3 °F (36.8 °C), resp. rate 18, height 6' 1\" (1.854 m), weight 131.1 kg (289 lb), SpO2 95 %. GENERAL: alert, cooperative, moderate resp distress, appears stated age  EYE: conjunctivae/corneas clear. PERRL. THROAT & NECK: normal and no erythema or exudates noted. LUNG: Breath sounds bilateral, bibasilar crackles, more clear today. HEART: regular rate and rhythm, S1S2, no murmur, no JVD  ABDOMEN: soft, non-tender, non-distended. Bowel sounds normal.   EXTREMITIES:  No edema, 2+ pedal/radial pulses bilaterally  SKIN: no rash or abnormalities  NEUROLOGIC: A&Ox3. Cranial nerves 2-12 grossly intact. Labs/Imaging/Diagnostics    Labs:  CBC:  Recent Labs     21  0531 21  0424 21  0336   WBC 8.8 9.8 11.0   RBC 2.82* 2.88* 2.97*   HGB 9.0* 9.3* 9.6*   HCT 27.4* 28.0* 28.4*   MCV 97.2 97.2 95.6   RDW 14.0 14.4 14.3    234 229     CHEMISTRIES:  Recent Labs     21  0531 21  0424 21  0336   * 134* 136*   K 4.0 4.6 4.1   CL 95* 96* 96*   CO2 35* 34* 23   BUN 22 26* 31*   CA 8.8 8.3 8.6   PT/INR:No results for input(s): INR, INREXT, INREXT in the last 72 hours. No lab exists for component: PROTIME  APTT:No results for input(s): APTT in the last 72 hours.   LIVER PROFILE:  Recent Labs     21  0531 21  0424 21  3177 AST 75* 103* 70*   * 159* 105*     Lab Results   Component Value Date/Time    ALT (SGPT) 170 (H) 01/18/2021 05:31 AM    AST (SGOT) 75 (H) 01/18/2021 05:31 AM    Alk. phosphatase 91 01/18/2021 05:31 AM    Bilirubin, direct <0.1 11/24/2014 05:00 AM    Bilirubin, total 0.6 01/18/2021 05:31 AM       Imaging Last 24 Hours:  Xr Chest Sngl V    Result Date: 1/18/2021  EXAM: Chest x-ray. INDICATION: Dyspnea. Covid 19. COMPARISON: January 16, 2021. TECHNIQUE: Frontal view chest x-ray. FINDINGS: Cardiomegaly and diffuse bilateral lung infiltrates are unchanged. No pneumothorax or pleural effusion is seen. IMPRESSION: Unchanged bilateral lung infiltrates. [id number] [facility]    Result Date: 1/17/2021  Exam: [facility] on 1/17/2021 7:29 PM Clinical History: The Male patient is 67years old presenting for elevated LFTs. COVID positive. Comparison: None Findings: Liver: Enlarged in size measuring 23 cm. Normal in echotexture. No focal solid or cystic lesions are demonstrated. Hepatopedal flow is demonstrated in the portal vein. Gallbladder:  Partially contracted gallbladder without shadowing stones or wall thickening. Common bile duct measures 2.7 mm. Pancreas:  Pancreatic tail is not optimally visualized due to overlying bowel gas, however the imaged head and body are unremarkable. Aorta and Vena Cava: Visualized segments are normal in size without evidence of aneurysm, or thrombosis. Right kidney: Grossly normal however not specifically evaluated. Impression: 1.   Hepatomegaly CPT code(s) [id number]     Assessment//Plan   Principal Problem:    Acute respiratory failure with hypoxia (HCC) (10/23/2014)    Active Problems:    Coronary artery disease involving native coronary artery of native heart without angina pectoris (10/12/2011)      Morbid obesity ([facility] (10/12/2011)      DM (diabetes mellitus) type II, controlled, with peripheral vascular disorder ([facility] (9/11/2014)      AGUSTIN (acute kidney injury) ([facility] (9/15/2014)      COPD (chronic obstructive pulmonary disease) ([facility] (4/10/2015)      PAD (peripheral artery disease) ([facility] (11/10/2015)      Hyperlipidemia (11/10/2015)      CAP (community acquired pneumonia) (12/31/2020)      Monomorphic ventricular tachycardia ([facility] (12/31/2020)      COVID-19 (12/31/2020)      Elevated troponin (12/31/2020)      Assessment & Plan    Acute respiratory failure with hypoxia secondary to COVID-19 infection (Dx 12/31):  - Pulmonology following, difficult to wean patient, -transferred to ICU 1/15, now improving on CPAP  - Completed Decadron  - Refused Remdesivir and convalescent plasma   - Continue home aerosols  - Completed antibiotics for possible CAP  - Wean oxygen as appropriate  - Continue Lasix       Monomorphic ventricular tachycardia:  - Patient found to have monomorphic VT with EMS, converted on his own  - Given Mag in ER  - Cardiology saw him in ER and recommended correcting electrolytes  - Start Amio if it happens again  - Cardiology signed off, didn't write a note?       Bacteremia due to gram positive bacteria:  - Initial blood cultures 12/23 with strep + staph but both common contaminants  - Repeat cultures NGTD        AGUSTIN:  - Resolved       Elevated troponin:  - Troponin 72 --> 425 --> 391  - No acute CP  - ?COVID related vs VT  - Repeat troponin until trending down  - Continue ASA + Plavix  - Cardiology assess in ER(?) -->  Previous attending has spoken to them and they think it's COVID       DM (diabetes mellitus) type II:  - A1C on 12/16/20 was 6.8  - Blood sugars in 300s, patient is on steroids   - Increase Lantus to 35 units   - continue Humalog SSI       Coronary artery disease:  - Stable  - Continue ASA + Plavix  - Continue Crestor       COPD:  - No acute wheezing  - Continue home aerosols       PAD:  - Continue ASA + Plavix       Hyperlipidemia:   - Continue Crestor       Elevated LFTs:  -abdominal U/S  -trend    Electronically signed by Aparna Nichols NP on 1/18/2021 at 10:17 AM

## 2021-01-19 LAB
ALBUMIN SERPL-MCNC: 1.9 G/DL (ref 3.2–4.6)
ALBUMIN/GLOB SERPL: 0.4 {RATIO} (ref 1.2–3.5)
ALP SERPL-CCNC: 87 U/L (ref 50–136)
ALT SERPL-CCNC: 152 U/L (ref 12–65)
ANION GAP SERPL CALC-SCNC: 3 MMOL/L (ref 7–16)
AST SERPL-CCNC: 67 U/L (ref 15–37)
BASOPHILS # BLD: 0 K/UL (ref 0–0.2)
BASOPHILS NFR BLD: 1 % (ref 0–2)
BILIRUB SERPL-MCNC: 0.7 MG/DL (ref 0.2–1.1)
BUN SERPL-MCNC: 21 MG/DL (ref 8–23)
CALCIUM SERPL-MCNC: 8.6 MG/DL (ref 8.3–10.4)
CHLORIDE SERPL-SCNC: 94 MMOL/L (ref 98–107)
CO2 SERPL-SCNC: 37 MMOL/L (ref 21–32)
CREAT SERPL-MCNC: 0.91 MG/DL (ref 0.8–1.5)
D DIMER PPP FEU-MCNC: 1.78 UG/ML(FEU)
DIFFERENTIAL METHOD BLD: ABNORMAL
EOSINOPHIL # BLD: 0.1 K/UL (ref 0–0.8)
EOSINOPHIL NFR BLD: 1 % (ref 0.5–7.8)
ERYTHROCYTE [DISTWIDTH] IN BLOOD BY AUTOMATED COUNT: 14.4 % (ref 11.9–14.6)
GLOBULIN SER CALC-MCNC: 4.6 G/DL (ref 2.3–3.5)
GLUCOSE BLD STRIP.AUTO-MCNC: 206 MG/DL (ref 65–100)
GLUCOSE BLD STRIP.AUTO-MCNC: 240 MG/DL (ref 65–100)
GLUCOSE BLD STRIP.AUTO-MCNC: 247 MG/DL (ref 65–100)
GLUCOSE BLD STRIP.AUTO-MCNC: 285 MG/DL (ref 65–100)
GLUCOSE SERPL-MCNC: 200 MG/DL (ref 65–100)
HCT VFR BLD AUTO: 29.4 % (ref 41.1–50.3)
HGB BLD-MCNC: 9.3 G/DL (ref 13.6–17.2)
IMM GRANULOCYTES # BLD AUTO: 0.1 K/UL (ref 0–0.5)
IMM GRANULOCYTES NFR BLD AUTO: 1 % (ref 0–5)
LYMPHOCYTES # BLD: 0.7 K/UL (ref 0.5–4.6)
LYMPHOCYTES NFR BLD: 8 % (ref 13–44)
MCH RBC QN AUTO: 31.3 PG (ref 26.1–32.9)
MCHC RBC AUTO-ENTMCNC: 31.6 G/DL (ref 31.4–35)
MCV RBC AUTO: 99 FL (ref 79.6–97.8)
MONOCYTES # BLD: 0.6 K/UL (ref 0.1–1.3)
MONOCYTES NFR BLD: 7 % (ref 4–12)
NEUTS SEG # BLD: 7 K/UL (ref 1.7–8.2)
NEUTS SEG NFR BLD: 83 % (ref 43–78)
NRBC # BLD: 0 K/UL (ref 0–0.2)
PLATELET # BLD AUTO: 221 K/UL (ref 150–450)
PMV BLD AUTO: 10.5 FL (ref 9.4–12.3)
POTASSIUM SERPL-SCNC: 3.7 MMOL/L (ref 3.5–5.1)
PROT SERPL-MCNC: 6.5 G/DL (ref 6.3–8.2)
RBC # BLD AUTO: 2.97 M/UL (ref 4.23–5.6)
SODIUM SERPL-SCNC: 134 MMOL/L (ref 136–145)
WBC # BLD AUTO: 8.5 K/UL (ref 4.3–11.1)

## 2021-01-19 PROCEDURE — 65270000029 HC RM PRIVATE

## 2021-01-19 PROCEDURE — 85025 COMPLETE CBC W/AUTO DIFF WBC: CPT

## 2021-01-19 PROCEDURE — 97530 THERAPEUTIC ACTIVITIES: CPT

## 2021-01-19 PROCEDURE — 2709999900 HC NON-CHARGEABLE SUPPLY

## 2021-01-19 PROCEDURE — 94640 AIRWAY INHALATION TREATMENT: CPT

## 2021-01-19 PROCEDURE — 82962 GLUCOSE BLOOD TEST: CPT

## 2021-01-19 PROCEDURE — 36415 COLL VENOUS BLD VENIPUNCTURE: CPT

## 2021-01-19 PROCEDURE — 94762 N-INVAS EAR/PLS OXIMTRY CONT: CPT

## 2021-01-19 PROCEDURE — 99232 SBSQ HOSP IP/OBS MODERATE 35: CPT | Performed by: INTERNAL MEDICINE

## 2021-01-19 PROCEDURE — 85379 FIBRIN DEGRADATION QUANT: CPT

## 2021-01-19 PROCEDURE — 74011636637 HC RX REV CODE- 636/637: Performed by: INTERNAL MEDICINE

## 2021-01-19 PROCEDURE — 80053 COMPREHEN METABOLIC PANEL: CPT

## 2021-01-19 PROCEDURE — 74011250637 HC RX REV CODE- 250/637: Performed by: INTERNAL MEDICINE

## 2021-01-19 PROCEDURE — 97110 THERAPEUTIC EXERCISES: CPT

## 2021-01-19 PROCEDURE — 74011250636 HC RX REV CODE- 250/636: Performed by: INTERNAL MEDICINE

## 2021-01-19 RX ORDER — ALBUTEROL SULFATE 90 UG/1
2 AEROSOL, METERED RESPIRATORY (INHALATION)
Status: DISCONTINUED | OUTPATIENT
Start: 2021-01-19 | End: 2021-01-26 | Stop reason: HOSPADM

## 2021-01-19 RX ORDER — INSULIN GLARGINE 100 [IU]/ML
5 INJECTION, SOLUTION SUBCUTANEOUS
Status: CANCELLED | OUTPATIENT
Start: 2021-01-19

## 2021-01-19 RX ORDER — BUDESONIDE AND FORMOTEROL FUMARATE DIHYDRATE 160; 4.5 UG/1; UG/1
2 AEROSOL RESPIRATORY (INHALATION)
Status: DISCONTINUED | OUTPATIENT
Start: 2021-01-19 | End: 2021-01-26 | Stop reason: HOSPADM

## 2021-01-19 RX ADMIN — ENOXAPARIN SODIUM 70 MG: 80 INJECTION SUBCUTANEOUS at 00:03

## 2021-01-19 RX ADMIN — INSULIN LISPRO 4 UNITS: 100 INJECTION, SOLUTION INTRAVENOUS; SUBCUTANEOUS at 16:30

## 2021-01-19 RX ADMIN — FUROSEMIDE 20 MG: 10 INJECTION INTRAMUSCULAR; INTRAVENOUS at 18:24

## 2021-01-19 RX ADMIN — ENOXAPARIN SODIUM 70 MG: 80 INJECTION SUBCUTANEOUS at 12:07

## 2021-01-19 RX ADMIN — ALBUTEROL SULFATE 2 PUFF: 108 INHALANT RESPIRATORY (INHALATION) at 20:31

## 2021-01-19 RX ADMIN — BUDESONIDE AND FORMOTEROL FUMARATE DIHYDRATE 2 PUFF: 160; 4.5 AEROSOL RESPIRATORY (INHALATION) at 20:31

## 2021-01-19 RX ADMIN — FUROSEMIDE 20 MG: 10 INJECTION INTRAMUSCULAR; INTRAVENOUS at 08:45

## 2021-01-19 RX ADMIN — INSULIN LISPRO 6 UNITS: 100 INJECTION, SOLUTION INTRAVENOUS; SUBCUTANEOUS at 12:20

## 2021-01-19 RX ADMIN — CLOPIDOGREL BISULFATE 75 MG: 75 TABLET ORAL at 08:45

## 2021-01-19 RX ADMIN — ROSUVASTATIN 10 MG: 10 TABLET, FILM COATED ORAL at 21:19

## 2021-01-19 RX ADMIN — MAGNESIUM GLUCONATE 500 MG ORAL TABLET 400 MG: 500 TABLET ORAL at 08:45

## 2021-01-19 RX ADMIN — ENOXAPARIN SODIUM 70 MG: 80 INJECTION SUBCUTANEOUS at 23:23

## 2021-01-19 RX ADMIN — INSULIN LISPRO 4 UNITS: 100 INJECTION, SOLUTION INTRAVENOUS; SUBCUTANEOUS at 06:24

## 2021-01-19 RX ADMIN — LORATADINE 10 MG: 10 TABLET ORAL at 08:45

## 2021-01-19 RX ADMIN — GUAIFENESIN 1200 MG: 600 TABLET ORAL at 21:20

## 2021-01-19 RX ADMIN — INSULIN LISPRO 4 UNITS: 100 INJECTION, SOLUTION INTRAVENOUS; SUBCUTANEOUS at 21:19

## 2021-01-19 RX ADMIN — Medication 20 ML: at 14:00

## 2021-01-19 RX ADMIN — BUDESONIDE AND FORMOTEROL FUMARATE DIHYDRATE 2 PUFF: 160; 4.5 AEROSOL RESPIRATORY (INHALATION) at 07:40

## 2021-01-19 RX ADMIN — ALBUTEROL SULFATE 2 PUFF: 108 INHALANT RESPIRATORY (INHALATION) at 13:05

## 2021-01-19 RX ADMIN — Medication 10 ML: at 21:21

## 2021-01-19 RX ADMIN — GUAIFENESIN 1200 MG: 600 TABLET ORAL at 08:45

## 2021-01-19 RX ADMIN — MONTELUKAST SODIUM 10 MG: 10 TABLET, FILM COATED ORAL at 21:26

## 2021-01-19 RX ADMIN — ASPIRIN 81 MG: 81 TABLET, COATED ORAL at 21:20

## 2021-01-19 RX ADMIN — ALBUTEROL SULFATE 2 PUFF: 108 INHALANT RESPIRATORY (INHALATION) at 07:40

## 2021-01-19 NOTE — PROGRESS NOTES
Resp even and unlabored at rest with eyes open. Oxygen sat 97% at present on Airvo 90%/60L. Will monitor.

## 2021-01-19 NOTE — PROGRESS NOTES
Received bedside shift report from Aubrey Kasper RN. Pt lying in bed. No apparent distress. Respirations even and unlabored. Instructed to call for assistance with needs, as they arise. Pt voiced understanding.

## 2021-01-19 NOTE — PROGRESS NOTES
ACUTE PHYSICAL THERAPY GOALS:  (Developed with and agreed upon by patient and/or caregiver.)  STG:  (1.)Mr. Carlos Gillespie will move from supine to sit and sit to supine , scoot up and down and roll side to side with CONTACT GUARD ASSIST within 3 treatment day(s). (2.)Mr. Carlos Gillespie will transfer from bed to chair and chair to bed with CONTACT GUARD ASSIST using the least restrictive device within 3 treatment day(s). (3.)Mr. Carlos Gillespie will ambulate with CONTACT GUARD ASSIST for 40 feet with the least restrictive device within 3 treatment day(s). (4.)Mr. Carlos Gillespie will perform standing static and dynamic balance activities x 10 minutes with CONTACT GUARD ASSIST to improve safety within 3 treatment day(s). (5.)Mr. Carlos Gillespie will maintain stable vital signs throughout all functional mobility within 3 treatment days.     LTG:  (1.)Mr. Carlos Gillespie will move from supine to sit and sit to supine , scoot up and down and roll side to side in bed with SUPERVISION within 7 treatment day(s). (2.)Mr. Carlos Gillespie will transfer from bed to chair and chair to bed with SUPERVISION using the least restrictive device within 7 treatment day(s). (3.)Mr. Carlos Gillespie will ambulate with SUPERVISION for 100+ feet with the least restrictive device within 7 treatment day(s). (4.)Mr. Carlos Gillespie will perform standing static and dynamic balance activities x 15 minutes with SUPERVISION to improve safety within 7 treatment day(s). (5.)Mr. Carlos Gillespie will ambulate and/or perform functional activities for 15 consecutive minutes with stable vital signs and no rests required to improve activity tolerance within 7 treatment days.   ________________________________________________________________________________________________    PHYSICAL THERAPY: Daily Note and PM Treatment Day # 6    Nicci Bueno is a 67 y.o. male   PRIMARY DIAGNOSIS: Acute respiratory failure with hypoxia ([address]  CAP (community acquired pneumonia) [J18.9]       ASSESSMENT:     REHAB RECOMMENDATIONS: CURRENT LEVEL OF FUNCTION:  (Most Recently Demonstrated)   Recommendation to date pending progress:  Setting:   Short-term Rehab   Equipment:    Rolling Walker Bed Mobility:   Not tested  Sit to Stand:   Moderate Assistance  Transfers:   Moderate Assistance  Gait/Mobility:   Not tested     ASSESSMENT:  Mr. Maxwell Olivares seen this PM for therapeutic exercise & transfer training. Pt. Able to performed repeated sit to/from stand this session w/ decreased O2 demand. However, pt. Fatigued after 30 seconds w/ rapid desaturation once returned to sitting. Pt. Cindi Campos decreased insight into his deficits & remains functionally well below his baseline. Will require ongoing PT services to address. SUBJECTIVE:   Mr. Maxwell Olivares states, \"I didn't think this virus could be this bad. \"    SOCIAL HISTORY/ LIVING ENVIRONMENT: Patient lives in a 2 story home (basement) on the main level with his spouse. His son lives \"200 feet from me. \" He typically is independent with ambulation/ADLs and able to perform yard work at baseline, however does admit to balance challenges even at baseline and experience a fall prior to admission. Home Environment: Private residence  # Steps to Enter: 1  One/Two Story Residence: Two story, live on 1st floor  Living Alone: No  Support Systems: Spouse/Significant Other/Partner, Child(dileep)  OBJECTIVE:     PAIN: VITAL SIGNS: LINES/DRAINS:   Pre Treatment: Pain Screen  Pain Scale 1: Numeric (0 - 10)  Pain Intensity 1: 0  Post Treatment: 0/10 Airvo 50L/75%  HR 85-92  O2 96%-78% once seated after performing standing task;  Recovers to 90% ~2-3 minutes   O2 Device: Heated, Hi flow nasal cannula     MOBILITY: I Mod I S SBA CGA Min Mod Max Total  NT x2 Comments:   Bed Mobility    Rolling [] [] [] [] [] [] [] [] [] [x] [] Pt.  Up in chair upon arrival   Supine to Sit [] [] [] [] [] [] [] [] [] [x] []    Scooting [] [] [] [] [] [] [] [] [] [x] []    Sit to Supine [] [] [] [] [] [] [] [] [] [] []    Transfers    Sit to Stand [] [] [] [] [] [] [x] [] [] [] [] From recliner   Bed to Chair [] [] [] [] [] [] [] [] [] [x] []    Stand to Sit [] [] [] [] [] [x] [] [] [] [] []    I=Independent, Mod I=Modified Independent, S=Supervision, SBA=Standby Assistance, CGA=Contact Guard Assistance,   Min=Minimal Assistance, Mod=Moderate Assistance, Max=Maximal Assistance, Total=Total Assistance, NT=Not Tested    GAIT: I Mod I S SBA CGA Min Mod Max Total  NT x2 Comments:   Level of Assistance [] [] [] [] [] [] [] [] [] [x] [] Deferred secondary to desaturation w/ static standing   Distance NA    DME N/A    Gait Quality n/a    I=Independent, Mod I=Modified Independent, S=Supervision, SBA=Standby Assistance, CGA=Contact Guard Assistance,   Min=Minimal Assistance, Mod=Moderate Assistance, Max=Maximal Assistance, Total=Total Assistance, NT=Not Tested    PLAN:   FREQUENCY/DURATION: PT Plan of Care: 3 times/week for duration of hospital stay or until stated goals are met, whichever comes first.  TREATMENT:     TREATMENT:   ($$ Therapeutic Activity: 23-37 mins  $$ Therapeutic Exercises: 8-22 mins    )    Therapeutic Exercise: (  10 minutes):  Exercises per grid below to improve mobility, strength and endurance. Required minimal visual and verbal cues to promote proper body mechanics. Progressed complexity of movement as indicated. Pt. Performed seated LE exercises x15 each as follows:  SEATED EXERCISES Sets Reps Comments   Ankle Pumps 1 15    Hip Flexion 1 15    Long Arc Quads 1 15        Therapeutic Activity: (  29 minutes): Therapeutic activities including Bed transfers, sitting EOB and standing to take a few steps along EOB to improve mobility, strength and balance. Required minimal   to promote static and dynamic balance in standing. Pt. Performed 2 trials of sit to/from stand w/ RW. On first trial pt. Performed static standing, fatiguing within 20 seconds. On second trial, pt.  Performed 5 reps marching in place w/ RW & min A from PT, sitting immediately afterwards.       AFTER TREATMENT POSITION/PRECAUTIONS:  Chair and Needs within reach    INTERDISCIPLINARY COLLABORATION:  RN/PCT and PT/PTA    TOTAL TREATMENT DURATION:  PT Patient Time In/Time Out  Time In: 1450  Time Out: [address], PT

## 2021-01-19 NOTE — PROGRESS NOTES
Bedside report received from [place], [place].  Pt alert x 4. On airvo. Call light within reach. Instructed pt to call should needs arise. Pt voiced understanding. Call light within reach.

## 2021-01-19 NOTE — PROGRESS NOTES
Annamaria Palacio  Admission Date: 12/31/2020             Daily Progress Note: 1/19/2021    The patient's chart is reviewed and the patient is discussed with the staff. 73yo WM with history of CAD s/p stenting, ischemic CM, PAD, COPD, DM2, HTN, HLD. Presented after fall at home, admitted 12/31 with acute respiratory failure due to covid. Family refused remdesivir and plasma. Started on dexamethasone (completed). Increasing O2 needs, up to CPAP with % FiO2.      Subjective:     Now out of ICU, Sat 95% on 50 lpm and 75% fiO2    Current Facility-Administered Medications   Medication Dose Route Frequency    albuterol (PROVENTIL HFA, VENTOLIN HFA, PROAIR HFA) inhaler 2 Puff  2 Puff Inhalation TID RT    budesonide-formoteroL (SYMBICORT) 160-4.5 mcg/actuation HFA inhaler 2 Puff  2 Puff Inhalation BID RT    guaiFENesin ER (MUCINEX) tablet 1,200 mg  1,200 mg Oral Q12H    enoxaparin (LOVENOX) injection 70 mg  70 mg SubCUTAneous Q12H    furosemide (LASIX) injection 20 mg  20 mg IntraVENous BID    loratadine (CLARITIN) tablet 10 mg  10 mg Oral DAILY    sodium chloride (NS) flush 5-40 mL  5-40 mL IntraVENous Q8H    sodium chloride (NS) flush 5-40 mL  5-40 mL IntraVENous PRN    acetaminophen (TYLENOL) tablet 650 mg  650 mg Oral Q6H PRN    Or    acetaminophen (TYLENOL) suppository 650 mg  650 mg Rectal Q6H PRN    polyethylene glycol (MIRALAX) packet 17 g  17 g Oral DAILY PRN    albuterol (PROVENTIL VENTOLIN) nebulizer solution 2.5 mg  2.5 mg Nebulization Q4H PRN    aspirin delayed-release tablet 81 mg  81 mg Oral QHS    clopidogreL (PLAVIX) tablet 75 mg  75 mg Oral DAILY    [Held by provider] hydroCHLOROthiazide (HYDRODIURIL) tablet 12.5 mg  12.5 mg Oral DAILY    montelukast (SINGULAIR) tablet 10 mg  10 mg Oral QHS    rosuvastatin (CRESTOR) tablet 10 mg  10 mg Oral QHS    insulin lispro (HUMALOG) injection   SubCUTAneous AC&HS    [Held by provider] amLODIPine/valsartan ([place]) 10/320 mg   Oral DAILY    magnesium oxide (MAG-OX) tablet 400 mg  400 mg Oral DAILY    0.9% sodium chloride infusion 250 mL  250 mL IntraVENous PRN       Review of Systems  Constitutional: negative for fever, chills, sweats  Cardiovascular: negative for chest pain, palpitations, syncope, edema  Gastrointestinal: negative for dysphagia, reflux, vomiting, diarrhea, abdominal pain, or melena  Neurologic: negative for focal weakness, numbness, headache    Objective:     Vitals:    01/19/21 0657 01/19/21 0740 01/19/21 1135 01/19/21 1305   BP:  133/76 120/66    Pulse:  69 (!) 54    Resp:  19 19    Temp:  98.6 °F (37 °C) 97.8 °F (36.6 °C)    SpO2:  98% 97% 95%   Weight: 262 lb 12.8 oz (119.2 kg)      Height:         Intake and Output:   01/17 1901 - 01/19 0700  In: 598 [P.O.:598]  Out: 1980 [Urine:1980]  No intake/output data recorded. Physical Exam:   Constitution:  the patient is well developed and in no acute distress  EENMT:  Sclera clear, pupils equal, oral mucosa moist  Respiratory: On Airvo at 48 L   Cardiovascular:  RRR without M,G,R  Gastrointestinal: soft and non-tender; with positive bowel sounds. Musculoskeletal: warm without cyanosis. There is no lower leg edema.   Skin:  no jaundice or rashes, no wounds   Neurologic: no gross neuro deficits, alert and oriented       CHEST XRAY:  1/18/2020 1/16 -- b/l infiltrates with no change since prior study            LAB  No lab exists for component: [place]   Recent Labs     01/19/21 0621 01/18/21  0531 01/17/21  0424   WBC 8.5 8.8 9.8   HGB 9.3* 9.0* 9.3*   HCT 29.4* 27.4* 28.0*    242 234     Recent Labs     01/19/21 0621 01/18/21  0531 01/17/21  0424   * 134* 134*   K 3.7 4.0 4.6   CL 94* 95* 96*   CO2 37* 35* 34*   * 233* 235*   BUN 21 22 26*   CREA 0.91 0.91 0.87   CA 8.6 8.8 8.3   ALB 1.9* 1.8* 1.8*     ABG:  No results found for: PH, PHI, PCO2, PCO2I, PO2, PO2I, HCO3, HCO3I, FIO2, FIO2I      MICRO    SARS-CoV-2 LAB Results  [facility] Test: No results found for: COV2NT   [facility] Test: No results found for: EDPR  [facility] Test: No components found for: [id number]           Assessment:  (Medical Decision Making)     Hospital Problems  Date Reviewed: 1/18/2021          Codes Class Noted POA    CAP (community acquired pneumonia) ICD-10-CM: J18.9  ICD-9-CM: 486  12/31/2020 Yes        Monomorphic ventricular tachycardia ([facility] ICD-10-CM: I47.2  ICD-9-CM: 427.1  12/31/2020 Yes        COVID-19 ICD-10-CM: U07.1  ICD-9-CM: 079.89  12/31/2020 Yes        Elevated troponin ICD-10-CM: R77.8  ICD-9-CM: 790.6  12/31/2020 Yes        PAD (peripheral artery disease) ([facility] ICD-10-CM: I73.9  ICD-9-CM: 443.9  11/10/2015 Yes        Hyperlipidemia ICD-10-CM: E78.5  ICD-9-CM: 272.4  11/10/2015 Yes        COPD (chronic obstructive pulmonary disease) ([facility] ICD-10-CM: J44.9  ICD-9-CM: 534  4/10/2015 Yes        * (Principal) Acute respiratory failure with hypoxia ([facility] ICD-10-CM: J96.01  ICD-9-CM: 518.81  10/23/2014 Yes        AGUSTIN (acute kidney injury) ([facility] (Chronic) ICD-10-CM: N17.9  ICD-9-CM: 584.9  9/15/2014 Yes        DM (diabetes mellitus) type II, controlled, with peripheral vascular disorder ([facility] (Chronic) ICD-10-CM: E11.51  ICD-9-CM: 250.70, 443.81  9/11/2014 Yes        Coronary artery disease involving native coronary artery of native heart without angina pectoris ICD-10-CM: I25.10  ICD-9-CM: 414.01  10/12/2011 Yes        Morbid obesity ([facility] (Chronic) ICD-10-CM: E66.01  ICD-9-CM: 278.01  10/12/2011 Yes              Patient with severe acute hypoxic respiratory failure due to covid pneumonia on Airvo to CPAP    Lovenox 70 mg Q 12 h    Plan:  (Medical Decision Making)     --wean oxygen  --Continue CPAP nightly and prn  --Self proning measures  --Continue albuterol, symbicort  --Continue mucinex and flutter valve  --COVID 19: s/p dexamethasone, refused remdesivir and plasma. --On Lasix 20 mg IV BID     -- Slowly improving, we will plan to see PRN.  Please call if further assistance needed or if patient deteriorates. Tc Speak, NP         Lungs:  Mild B inspiratory crackles  Heart:  RRR with no Murmur/Rubs/Gallops    Additional Comments:    Patient currently on 75% airvo. Discussed with him still a long way of recovery to go. Completed steroids. Refused remdesivir and plasma. At this point time and supportive care are the only available interventions. Continue o2 support with cpap at night. Try to keep euvolemic. Pulmonary will be on standby if any worsening that needs ICU level care or new issues arise. I have spoken with and examined the patient. I agree with the above assessment and plan as documented.     Nadeem Gilliland MD

## 2021-01-19 NOTE — PROGRESS NOTES
Hospitalist Progress Note     Admit Date:  2020  1:18 AM   Name:  Jewels Lanza   Age:  67 y.o.  :  1945   MRN:  [id number]   PCP:  Johanny Warren MD  Treatment Team: Attending Provider: Radha Pittman MD; Utilization Review: Taylor Romano RN; Consulting Provider: Mallory Calix MD; Tech: Gino Lyons; Physical Therapist: Jade Cabezas PT; Hospitalist: Alayna Oneal NP; Care Manager: Naun Ling RN    Subjective:     66 yo CM with past history of CAD s/p stents, ischemic cardiomyopathy, PAD (on Plavix and cilostazo), COPD, DM type II, HTN, HLD presents via EMS after fall at home and was admitted due to acute respiratory failure due to COVID and had an episode of VTach. Family and pt refused remdesivir and convalescent plasma. Patient oxygen requirement has been worsening. Initially he was on 40 to 60 L airvo BiPAP at night. Now for the past 2 days patient is on CPAP 75 - 100%. Unable to wean him to the OptiFlow. Pulmonology consulted and following. 0118 CXR shows stable bilat infilatrates.  transferred out of ICU. Today pt is weak, states too weak to get out of bed. Did not eat breakfast states bc of poor appetite. Still on CPAP.       Objective:     Patient Vitals for the past 24 hrs:   Temp Pulse Resp BP SpO2   21 1135 97.8 °F (36.6 °C) (!) 54 19 120/66 97 %   21 0740 98.6 °F (37 °C) 69 19 133/76 98 %   21 0429  83      21 0327 98.2 °F (36.8 °C) 72 18 123/63 95 %   21 0007 99 °F (37.2 °C) 63 18 127/70 92 %   21 2054 99.7 °F (37.6 °C) 79 18 105/62 95 %   21 2026     92 %   21 1532 99.5 °F (37.5 °C) 82 18 110/60 99 %   21 1504     98 %   21 1452     98 %   21 1450     100 %     Oxygen Therapy  O2 Sat (%): 97 % (21 1135)  Pulse via Oximetry: 67 beats per minute (21 0740)  O2 Device: (AirVo) (21 1119)  O2 Flow Rate (L/min): 50 l/min (21 1119)  O2 Temperature: 87.8 °F (31 °C) (01/18/21 2026)  FIO2 (%): 75 % (01/19/21 1119)    Intake/Output Summary (Last 24 hours) at 1/19/2021 1219  Last data filed at 1/19/2021 0657  Gross per 24 hour   Intake 358 ml   Output 1500 ml   Net -1142 ml         General:    Well nourished. Alert. Generalized weakness    CV:   RRR. No murmur, rub, or gallop. Lungs:   Coarse bilat, weak inspiratory effort  Abdomen:   Soft, nontender, nondistended. Extremities: Warm and dry. No cyanosis or edema. Skin:     No rashes or jaundice. Neuro:  No gross focal deficits    Data Review:  I have reviewed all labs, meds, telemetry events, and studies from the last 24 hours:    Recent Results (from the past 24 hour(s))   GLUCOSE, POC    Collection Time: 01/18/21  4:41 PM   Result Value Ref Range    Glucose (POC) 265 (H) 65 - 100 mg/dL   GLUCOSE, POC    Collection Time: 01/18/21  8:56 PM   Result Value Ref Range    Glucose (POC) 233 (H) 65 - 100 mg/dL   GLUCOSE, POC    Collection Time: 01/19/21  6:06 AM   Result Value Ref Range    Glucose (POC) 206 (H) 65 - 316 mg/dL   METABOLIC PANEL, COMPREHENSIVE    Collection Time: 01/19/21  6:21 AM   Result Value Ref Range    Sodium 134 (L) 136 - 145 mmol/L    Potassium 3.7 3.5 - 5.1 mmol/L    Chloride 94 (L) 98 - 107 mmol/L    CO2 37 (H) 21 - 32 mmol/L    Anion gap 3 (L) 7 - 16 mmol/L    Glucose 200 (H) 65 - 100 mg/dL    BUN 21 8 - 23 MG/DL    Creatinine 0.91 0.8 - 1.5 MG/DL    GFR est AA >60 >60 ml/min/1.73m2    GFR est non-AA >60 >60 ml/min/1.73m2    Calcium 8.6 8.3 - 10.4 MG/DL    Bilirubin, total 0.7 0.2 - 1.1 MG/DL    ALT (SGPT) 152 (H) 12 - 65 U/L    AST (SGOT) 67 (H) 15 - 37 U/L    Alk.  phosphatase 87 50 - 136 U/L    Protein, total 6.5 6.3 - 8.2 g/dL    Albumin 1.9 (L) 3.2 - 4.6 g/dL    Globulin 4.6 (H) 2.3 - 3.5 g/dL    A-G Ratio 0.4 (L) 1.2 - 3.5     CBC WITH AUTOMATED DIFF    Collection Time: 01/19/21  6:21 AM   Result Value Ref Range    WBC 8.5 4.3 - 11.1 K/uL    RBC 2.97 (L) 4.23 - 5.6 M/uL    HGB 9.3 (L) 13.6 - 17.2 g/dL    HCT 29.4 (L) 41.1 - 50.3 %    MCV 99.0 (H) 79.6 - 97.8 FL    MCH 31.3 26.1 - 32.9 PG    MCHC 31.6 31.4 - 35.0 g/dL    RDW 14.4 11.9 - 14.6 %    PLATELET 576 067 - 166 K/uL    MPV 10.5 9.4 - 12.3 FL    ABSOLUTE NRBC 0.00 0.0 - 0.2 K/uL    DF AUTOMATED      NEUTROPHILS 83 (H) 43 - 78 %    LYMPHOCYTES 8 (L) 13 - 44 %    MONOCYTES 7 4.0 - 12.0 %    EOSINOPHILS 1 0.5 - 7.8 %    BASOPHILS 1 0.0 - 2.0 %    IMMATURE GRANULOCYTES 1 0.0 - 5.0 %    ABS. NEUTROPHILS 7.0 1.7 - 8.2 K/UL    ABS. LYMPHOCYTES 0.7 0.5 - 4.6 K/UL    ABS. MONOCYTES 0.6 0.1 - 1.3 K/UL    ABS. EOSINOPHILS 0.1 0.0 - 0.8 K/UL    ABS. BASOPHILS 0.0 0.0 - 0.2 K/UL    ABS. IMM.  GRANS. 0.1 0.0 - 0.5 K/UL   GLUCOSE, POC    Collection Time: 01/19/21 11:08 AM   Result Value Ref Range    Glucose (POC) 285 (H) 65 - 100 mg/dL        All Micro Results     Procedure Component Value Units Date/Time    CULTURE, BLOOD [[id number]] Collected: 01/02/21 0843    Order Status: Completed Specimen: Blood Updated: 01/07/21 1045     Special Requests: --        RIGHT  Antecubital       Culture result: NO GROWTH 5 DAYS       CULTURE, BLOOD [[id number]] Collected: 01/02/21 0848    Order Status: Completed Specimen: Blood Updated: 01/07/21 1045     Special Requests: --        LEFT  Antecubital       Culture result: NO GROWTH 5 DAYS       CULTURE, BLOOD [[id number]] Collected: 12/31/20 0309    Order Status: Completed Specimen: Blood Updated: 01/05/21 1038     Special Requests: --        RIGHT  HAND       Culture result: NO GROWTH 5 DAYS       CULTURE, BLOOD [[id number]]  (Abnormal) Collected: 12/31/20 0549    Order Status: Completed Specimen: Blood Updated: 01/03/21 0736     Special Requests: --        NO SPECIAL REQUESTS  RIGHT  FOREARM       GRAM STAIN GRAM POSITIVE COCCI         AEROBIC BOTTLE POSITIVE               RESULTS VERIFIED, PHONED TO AND READ BACK BY GALLITO DSOUZA @ 0451 1/1/21 MM           Culture result:       ALPHA STREPTOCOCCUS, NOT S. PNEUMONIAE                  STAPHYLOCOCCUS SPECIES, COAGULASE NEGATIVE            REFER TO [employer] [id number]            THIS ORGANISM MAY BE INDICATIVE OF CULTURE CONTAMINATION, HOWEVER, CLINICAL CORRELATION NEEDS TO BE EVALUATED, AS EACH CASE IS UNIQUE. BLOOD CULTURE ID PANEL [[id number]]  (Abnormal) Collected: 12/31/20 0549    Order Status: Completed Specimen: Blood Updated: 01/01/21 0801     Acc. no. from Micro Order [id number]     Staphylococcus Detected        Comment: RESULTS VERIFIED, PHONED TO AND READ BACK BY  GALLITO DSOUZA @ 0451 1/1/21 MM          Streptococcus Detected        Comment: RESULTS VERIFIED, PHONED TO AND READ BACK BY  GALLITO DSOUZA @ 0451 1/1/21 MM          mecA (Methicillin-Resistance Genes) NOT DETECTED        INTERPRETATION       Multiple organisms detected. Results do not replace susceptibility testing. No results found for this visit on 12/31/20.     Current Meds:  Current Facility-Administered Medications   Medication Dose Route Frequency    albuterol (PROVENTIL HFA, VENTOLIN HFA, PROAIR HFA) inhaler 2 Puff  2 Puff Inhalation TID RT    budesonide-formoteroL (SYMBICORT) 160-4.5 mcg/actuation HFA inhaler 2 Puff  2 Puff Inhalation BID RT    guaiFENesin ER (MUCINEX) tablet 1,200 mg  1,200 mg Oral Q12H    enoxaparin (LOVENOX) injection 70 mg  70 mg SubCUTAneous Q12H    furosemide (LASIX) injection 20 mg  20 mg IntraVENous BID    loratadine (CLARITIN) tablet 10 mg  10 mg Oral DAILY    sodium chloride (NS) flush 5-40 mL  5-40 mL IntraVENous Q8H    sodium chloride (NS) flush 5-40 mL  5-40 mL IntraVENous PRN    acetaminophen (TYLENOL) tablet 650 mg  650 mg Oral Q6H PRN    Or    acetaminophen (TYLENOL) suppository 650 mg  650 mg Rectal Q6H PRN    polyethylene glycol (MIRALAX) packet 17 g  17 g Oral DAILY PRN    albuterol (PROVENTIL VENTOLIN) nebulizer solution 2.5 mg  2.5 mg Nebulization Q4H PRN    aspirin delayed-release tablet 81 mg  81 mg Oral QHS    clopidogreL (PLAVIX) tablet 75 mg  75 mg Oral DAILY    [Held by provider] hydroCHLOROthiazide (HYDRODIURIL) tablet 12.5 mg  12.5 mg Oral DAILY    montelukast (SINGULAIR) tablet 10 mg  10 mg Oral QHS    rosuvastatin (CRESTOR) tablet 10 mg  10 mg Oral QHS    insulin lispro (HUMALOG) injection   SubCUTAneous AC&HS    [Held by provider] amLODIPine/valsartan (EXFORGE) 10/320 mg   Oral DAILY    magnesium oxide (MAG-OX) tablet 400 mg  400 mg Oral DAILY    0.9% sodium chloride infusion 250 mL  250 mL IntraVENous PRN       Other Studies (last 24 hours):  No results found.     Assessment and Plan:     Hospital Problems as of 1/19/2021 Date Reviewed: 1/18/2021          Codes Class Noted - Resolved POA    CAP (community acquired pneumonia) ICD-10-CM: J18.9  ICD-9-CM: 486  12/31/2020 - Present Yes        Monomorphic ventricular tachycardia ([facility] ICD-10-CM: I47.2  ICD-9-CM: 427.1  12/31/2020 - Present Yes        COVID-19 ICD-10-CM: U07.1  ICD-9-CM: 079.89  12/31/2020 - Present Yes        Elevated troponin ICD-10-CM: R77.8  ICD-9-CM: 790.6  12/31/2020 - Present Yes        PAD (peripheral artery disease) ([facility] ICD-10-CM: I73.9  ICD-9-CM: 443.9  11/10/2015 - Present Yes        Hyperlipidemia ICD-10-CM: E78.5  ICD-9-CM: 272.4  11/10/2015 - Present Yes        COPD (chronic obstructive pulmonary disease) ([facility] ICD-10-CM: J44.9  ICD-9-CM: 496  4/10/2015 - Present Yes        * (Principal) Acute respiratory failure with hypoxia ([facility] ICD-10-CM: J96.01  ICD-9-CM: 518.81  10/23/2014 - Present Yes        AGUSTIN (acute kidney injury) ([facility] (Chronic) ICD-10-CM: N17.9  ICD-9-CM: 584.9  9/15/2014 - Present Yes        DM (diabetes mellitus) type II, controlled, with peripheral vascular disorder (HCC) (Chronic) ICD-10-CM: E11.51  ICD-9-CM: 250.70, 443.81  9/11/2014 - Present Yes        Coronary artery disease involving native coronary artery of native heart without angina pectoris ICD-10-CM: I25.10  ICD-9-CM: 414.01  10/12/2011 - Present Yes        Morbid obesity ([facility] (Chronic) ICD-10-CM: E66.01  ICD-9-CM: 278.01  10/12/2011 - Present Yes              Plan:      Acute respiratory failure with hypoxia secondary to COVID-19 infection (Dx 12/31):  - Pulmonology following, difficult to wean patient, -transferred to ICU 1/15, now on CPAP with 75% Fi02  - Completed Decadron  - Refused Remdesivir and convalescent plasma  - Continue home aerosols  - Completed antibiotics for possible CAP  - Wean oxygen as appropriate  - Continue Lasix  -CXR 01/18 stable       Monomorphic ventricular tachycardia:  - Patient found to have monomorphic VT with EMS, converted on his own  - Given Mag in ER  - Cardiology saw him in ER and recommended correcting electrolytes  - Start Amio if it happens again  - Cardiology signed off, didn't write a note?   - Remote tele, no events since        Bacteremia due to gram positive bacteria:  - Initial blood cultures 12/23 with strep + staph but both common contaminants  - Repeat cultures NGTD        AGUSTIN:  - Resolved       Elevated troponin:  - Troponin 72 --> 425 --> 391  - No acute CP  - ? COVID related vs VT  - Repeat troponin until trending down  - Continue ASA + Plavix  - Cardiology assess in ER(?) -->  Previous attending has spoken to them and they think it's COVID       DM (diabetes mellitus) type II:  - A1C on 12/16/20 was 6.8  - BS 200s, Lantus has been on hold  -Start low dose Lantus at 5 units, caution due to steroids stopped and poor appetite       CAD, PAD:   - Stable  - Continue ASA + Plavix   - Continue Crestor         Elevated LFTs:  -abdominal U/S hepatomegaly   -trending    DC planning/Dispo:    - will likely need STR    Diet:  DIET NUTRITIONAL SUPPLEMENTS  DIET NUTRITIONAL SUPPLEMENTS  DIET DIABETIC CONSISTENT CARB  DVT ppx:  Lovenox    Signed:  Suzan Eden NP

## 2021-01-19 NOTE — PROGRESS NOTES
Comprehensive Nutrition Assessment    Type and Reason for Visit: Reassess    Nutrition Recommendations/Plan:    Continue current diet and ONS   Provide Nepro with med passes to increase kcal and protein intake   If within goals of care, pursue nutrition support (EN via NGT preferred). If TF pursued, please consult RD for nutrition support. Malnutrition Assessment:  Malnutrition Status: At risk for malnutrition (specify)(increased O2 demands limiting PO)  Nutrition Assessment:   Nutrition History: PTA pt was eating normally with 3 or so meals a day. Patient reports consistent weight based on weighing himself twice daily PTA. Nutrition Background: PMH: CAD s/p stents, ischemic cardiomyopathy, PAD,COPD, DM type II, HTN, HLD. Presented after fall at home and was admitted due to acute respiratory failure due to [place] and had an episode of VTach. Daily Update:  Pt did not eat today per recorded intakes and MD note due to loss of appetite. Pt currently on heated hi flow at this time. Called pt x2 on room phone and once on mobile phone as listed in demographics. RN not available to discuss pt's po intake x2. Last documented intake of supplements 1/15, but appears to be an error in entry at 474%.     Nutrition Related Findings:   NFPE deferred d/t isolation      Current Nutrition Therapies:  DIET NUTRITIONAL SUPPLEMENTS All Meals; Nepro ( )  DIET NUTRITIONAL SUPPLEMENTS Dinner, Breakfast; Nepro ( )  DIET DIABETIC CONSISTENT CARB Regular    Current Intake:   Average Meal Intake: 0% Average Supplement Intake: Unable to assess      Anthropometric Measures:  Height: 6' 1\" (185.4 cm)  Current Body Wt: 119.2 kg (262 lb 12.6 oz)(1/19), Weight source: Bed scale  BMI: 34.7, Obese class 1 (BMI 30.0-34.9)  Ideal Body Wt: 184 lbs (84 kg), 157 %  Usual Body Wt: (286-298# per EMR),            Estimated Daily Nutrient Needs:  Energy (kcal/day): 2821-1015 kcal/day (Kcal/kg(15-18 kcal/kg), Weight Used: Admission(131.1 kg-unspecified source))  Protein (g/day):  g/day Weight Used: ((20% daily needs))  Fluid (ml/day):   (1 ml/kcal)    Nutrition Diagnosis:   · Inadequate oral intake related to (loss of appetite) as evidenced by (intake as above)    Nutrition Interventions:   Food and/or Nutrient Delivery: Continue current diet, Continue oral nutrition supplement     Coordination of Nutrition Care: Continue to monitor while inpatient    Goals:   Previous Goal Met: Progressing toward goal(s)  Active Goal: Meet >75% estimated nutrition needs within 7 days    Nutrition Monitoring and Evaluation:      Food/Nutrient Intake Outcomes: Food and nutrient intake, Supplement intake       Discharge Planning:     Too soon to determine    Electronically signed by Yari Julian MS, DOMING[place], LD 1/19/2021 at 5:49 PM  Contact: [phone]    Disaster Mode active

## 2021-01-19 NOTE — DIABETES MGMT
Patient admitted with acute respiratory failure with hypoxia. Blood glucose ranged 233-272 yesterday with patient receiving Humalog 20 units. Blood glucose this morning was 200. Reviewed patient current regimen: Humalog SSI. Provider could consider low dose basal insulin if stricter glycemic control is desired.

## 2021-01-20 ENCOUNTER — APPOINTMENT (OUTPATIENT)
Dept: GENERAL RADIOLOGY | Age: 76
DRG: 177 | End: 2021-01-20
Attending: INTERNAL MEDICINE
Payer: MEDICARE

## 2021-01-20 LAB
ALBUMIN SERPL-MCNC: 1.9 G/DL (ref 3.2–4.6)
ALBUMIN/GLOB SERPL: 0.4 {RATIO} (ref 1.2–3.5)
ALP SERPL-CCNC: 91 U/L (ref 50–136)
ALT SERPL-CCNC: 162 U/L (ref 12–65)
ANION GAP SERPL CALC-SCNC: 3 MMOL/L (ref 7–16)
AST SERPL-CCNC: 74 U/L (ref 15–37)
BASOPHILS # BLD: 0 K/UL (ref 0–0.2)
BASOPHILS NFR BLD: 0 % (ref 0–2)
BILIRUB SERPL-MCNC: 0.5 MG/DL (ref 0.2–1.1)
BUN SERPL-MCNC: 20 MG/DL (ref 8–23)
CALCIUM SERPL-MCNC: 8.6 MG/DL (ref 8.3–10.4)
CHLORIDE SERPL-SCNC: 92 MMOL/L (ref 98–107)
CO2 SERPL-SCNC: 37 MMOL/L (ref 21–32)
CREAT SERPL-MCNC: 0.94 MG/DL (ref 0.8–1.5)
DIFFERENTIAL METHOD BLD: ABNORMAL
EOSINOPHIL # BLD: 0.1 K/UL (ref 0–0.8)
EOSINOPHIL NFR BLD: 1 % (ref 0.5–7.8)
ERYTHROCYTE [DISTWIDTH] IN BLOOD BY AUTOMATED COUNT: 14.3 % (ref 11.9–14.6)
GLOBULIN SER CALC-MCNC: 4.6 G/DL (ref 2.3–3.5)
GLUCOSE BLD STRIP.AUTO-MCNC: 200 MG/DL (ref 65–100)
GLUCOSE BLD STRIP.AUTO-MCNC: 272 MG/DL (ref 65–100)
GLUCOSE BLD STRIP.AUTO-MCNC: 283 MG/DL (ref 65–100)
GLUCOSE BLD STRIP.AUTO-MCNC: 298 MG/DL (ref 65–100)
GLUCOSE SERPL-MCNC: 192 MG/DL (ref 65–100)
HCT VFR BLD AUTO: 28.6 % (ref 41.1–50.3)
HGB BLD-MCNC: 9.2 G/DL (ref 13.6–17.2)
IMM GRANULOCYTES # BLD AUTO: 0.1 K/UL (ref 0–0.5)
IMM GRANULOCYTES NFR BLD AUTO: 1 % (ref 0–5)
LYMPHOCYTES # BLD: 0.7 K/UL (ref 0.5–4.6)
LYMPHOCYTES NFR BLD: 10 % (ref 13–44)
MCH RBC QN AUTO: 32.1 PG (ref 26.1–32.9)
MCHC RBC AUTO-ENTMCNC: 32.2 G/DL (ref 31.4–35)
MCV RBC AUTO: 99.7 FL (ref 79.6–97.8)
MONOCYTES # BLD: 0.6 K/UL (ref 0.1–1.3)
MONOCYTES NFR BLD: 8 % (ref 4–12)
NEUTS SEG # BLD: 5.6 K/UL (ref 1.7–8.2)
NEUTS SEG NFR BLD: 80 % (ref 43–78)
NRBC # BLD: 0 K/UL (ref 0–0.2)
PLATELET # BLD AUTO: 185 K/UL (ref 150–450)
PMV BLD AUTO: 10.5 FL (ref 9.4–12.3)
POTASSIUM SERPL-SCNC: 3.9 MMOL/L (ref 3.5–5.1)
PROT SERPL-MCNC: 6.5 G/DL (ref 6.3–8.2)
RBC # BLD AUTO: 2.87 M/UL (ref 4.23–5.6)
SODIUM SERPL-SCNC: 132 MMOL/L (ref 136–145)
WBC # BLD AUTO: 7 K/UL (ref 4.3–11.1)

## 2021-01-20 PROCEDURE — 36415 COLL VENOUS BLD VENIPUNCTURE: CPT

## 2021-01-20 PROCEDURE — 74011250637 HC RX REV CODE- 250/637: Performed by: INTERNAL MEDICINE

## 2021-01-20 PROCEDURE — 77010033711 HC HIGH FLOW OXYGEN

## 2021-01-20 PROCEDURE — 74011250636 HC RX REV CODE- 250/636: Performed by: INTERNAL MEDICINE

## 2021-01-20 PROCEDURE — 82962 GLUCOSE BLOOD TEST: CPT

## 2021-01-20 PROCEDURE — 85025 COMPLETE CBC W/AUTO DIFF WBC: CPT

## 2021-01-20 PROCEDURE — 65270000029 HC RM PRIVATE

## 2021-01-20 PROCEDURE — 97110 THERAPEUTIC EXERCISES: CPT

## 2021-01-20 PROCEDURE — 97530 THERAPEUTIC ACTIVITIES: CPT

## 2021-01-20 PROCEDURE — 94760 N-INVAS EAR/PLS OXIMETRY 1: CPT

## 2021-01-20 PROCEDURE — 94640 AIRWAY INHALATION TREATMENT: CPT

## 2021-01-20 PROCEDURE — 71045 X-RAY EXAM CHEST 1 VIEW: CPT

## 2021-01-20 PROCEDURE — 94762 N-INVAS EAR/PLS OXIMTRY CONT: CPT

## 2021-01-20 PROCEDURE — 80053 COMPREHEN METABOLIC PANEL: CPT

## 2021-01-20 PROCEDURE — 74011636637 HC RX REV CODE- 636/637: Performed by: INTERNAL MEDICINE

## 2021-01-20 RX ADMIN — INSULIN LISPRO 6 UNITS: 100 INJECTION, SOLUTION INTRAVENOUS; SUBCUTANEOUS at 16:19

## 2021-01-20 RX ADMIN — MAGNESIUM GLUCONATE 500 MG ORAL TABLET 400 MG: 500 TABLET ORAL at 08:15

## 2021-01-20 RX ADMIN — INSULIN LISPRO 6 UNITS: 100 INJECTION, SOLUTION INTRAVENOUS; SUBCUTANEOUS at 12:00

## 2021-01-20 RX ADMIN — FUROSEMIDE 20 MG: 10 INJECTION INTRAMUSCULAR; INTRAVENOUS at 16:19

## 2021-01-20 RX ADMIN — BUDESONIDE AND FORMOTEROL FUMARATE DIHYDRATE 2 PUFF: 160; 4.5 AEROSOL RESPIRATORY (INHALATION) at 20:00

## 2021-01-20 RX ADMIN — ASPIRIN 81 MG: 81 TABLET, COATED ORAL at 21:29

## 2021-01-20 RX ADMIN — INSULIN LISPRO 4 UNITS: 100 INJECTION, SOLUTION INTRAVENOUS; SUBCUTANEOUS at 05:47

## 2021-01-20 RX ADMIN — Medication 10 ML: at 21:29

## 2021-01-20 RX ADMIN — ALBUTEROL SULFATE 2 PUFF: 108 INHALANT RESPIRATORY (INHALATION) at 14:00

## 2021-01-20 RX ADMIN — CLOPIDOGREL BISULFATE 75 MG: 75 TABLET ORAL at 08:15

## 2021-01-20 RX ADMIN — ENOXAPARIN SODIUM 70 MG: 80 INJECTION SUBCUTANEOUS at 11:59

## 2021-01-20 RX ADMIN — BUDESONIDE AND FORMOTEROL FUMARATE DIHYDRATE 2 PUFF: 160; 4.5 AEROSOL RESPIRATORY (INHALATION) at 08:22

## 2021-01-20 RX ADMIN — ALBUTEROL SULFATE 2 PUFF: 108 INHALANT RESPIRATORY (INHALATION) at 08:22

## 2021-01-20 RX ADMIN — INSULIN LISPRO 6 UNITS: 100 INJECTION, SOLUTION INTRAVENOUS; SUBCUTANEOUS at 21:27

## 2021-01-20 RX ADMIN — Medication 10 ML: at 05:46

## 2021-01-20 RX ADMIN — FUROSEMIDE 20 MG: 10 INJECTION INTRAMUSCULAR; INTRAVENOUS at 08:15

## 2021-01-20 RX ADMIN — ENOXAPARIN SODIUM 70 MG: 80 INJECTION SUBCUTANEOUS at 22:10

## 2021-01-20 RX ADMIN — GUAIFENESIN 1200 MG: 600 TABLET ORAL at 21:29

## 2021-01-20 RX ADMIN — LORATADINE 10 MG: 10 TABLET ORAL at 08:15

## 2021-01-20 RX ADMIN — ROSUVASTATIN 10 MG: 10 TABLET, FILM COATED ORAL at 21:29

## 2021-01-20 RX ADMIN — Medication 10 ML: at 12:00

## 2021-01-20 RX ADMIN — ALBUTEROL SULFATE 2 PUFF: 108 INHALANT RESPIRATORY (INHALATION) at 20:00

## 2021-01-20 RX ADMIN — MONTELUKAST SODIUM 10 MG: 10 TABLET, FILM COATED ORAL at 21:29

## 2021-01-20 RX ADMIN — GUAIFENESIN 1200 MG: 600 TABLET ORAL at 08:15

## 2021-01-20 NOTE — PROGRESS NOTES
Resp even and unlabored at rest with eyes closed. Continuous pulse ox with oxygen sat 95% Will monitor.

## 2021-01-20 NOTE — PROGRESS NOTES
Pt able to rest in long intervals on Airvo 75%/ 50L with continuous pulse ox. Oxygen sat 93% at present. Resp even and unlabored with eyes closed. Pt remains on remote telemetry. Will update oncoming nurse. Yes

## 2021-01-20 NOTE — PROGRESS NOTES
ACUTE PHYSICAL THERAPY GOALS:  (Developed with and agreed upon by patient and/or caregiver.)  STG:  (1.)Mr. Malgorzata Salinas will move from supine to sit and sit to supine , scoot up and down and roll side to side with CONTACT GUARD ASSIST within 3 treatment day(s). 1/20/20201 - Goal not met due to slower than expected progress, cont. Current goal x7 additional days. (2.)Mr. Malgorzata Salinas will transfer from bed to chair and chair to bed with CONTACT GUARD ASSIST using the least restrictive device within 3 treatment day(s). 1/20/20201 - Goal not met due to slower than expected progress, cont. Current goal x7 additional days. (3.)Mr. Malgorzata Salinas will ambulate with CONTACT GUARD ASSIST for 40 feet with the least restrictive device within 3 treatment day(s). 1/20/20201 - D/C Goal   1/20/2021 New goal:  Mr. Malgorzata Salinas will ambulate w/ min A x10' w/ RW within 7 treatment days. (4.)Mr. Malgorzata Salinas will perform standing static and dynamic balance activities x 10 minutes with CONTACT GUARD ASSIST to improve safety within 3 treatment day(s). 1/20/20201 - D/C Goal secondary to slower than expected progress   1/20/2021 New goal:  Mr. Malgorzata Salinas will stand x1 minute w/ min A w/ B UE support in 7 days. (5.)Mr. Malgorzata Salinas will maintain stable vital signs throughout all functional mobility within 3 treatment days. 1/20/20201 - Goal not met due to slower than expected progress, cont. Current goal x7 additional days.       LTG:  (1.)Mr. Malgorzata Salinas will move from supine to sit and sit to supine , scoot up and down and roll side to side in bed with SUPERVISION within 7 treatment day(s). (2.)Mr. Malgorzata Sailnas will transfer from bed to chair and chair to bed with SUPERVISION using the least restrictive device within 7 treatment day(s). (3.)Mr. Malgorzata Salinas will ambulate with SUPERVISION for 100+ feet with the least restrictive device within 7 treatment day(s). (4.)Mr. Malgorzata Salinas will perform standing static and dynamic balance activities x 15 minutes with SUPERVISION to improve safety within 7 treatment day(s). (5.)Mr. Lexx Swartz will ambulate and/or perform functional activities for 15 consecutive minutes with stable vital signs and no rests required to improve activity tolerance within 7 treatment days. ________________________________________________________________________________________________    PHYSICAL THERAPY: Daily Note and PM Treatment Day # 1    Varsha Hankins is a 67 y.o. male   PRIMARY DIAGNOSIS: Acute respiratory failure with hypoxia ([address]  CAP (community acquired pneumonia) [J18.9]       ASSESSMENT:     REHAB RECOMMENDATIONS: CURRENT LEVEL OF FUNCTION:  (Most Recently Demonstrated)   Recommendation to date pending progress:  Setting:   Short-term Rehab   Equipment:    Rolling Walker Bed Mobility:   Not tested  Sit to Stand:   Minimal Assistance  Transfers:   Minimal Assistance  Gait/Mobility:   Minimal Assistance     ASSESSMENT:  Mr. Lexx Swartz seen this PM for therapeutic exercise, transfer training, & gait training. Pt. Requiring less assistance w/ sit to/from stand transfers this session as well as tolerated transfer to/from EOB for exercises. Pt. Does have decreased respiratory control when mobilizing w/ ongoing desaturations, but with cueing for deep breathing levels rebound. Pt. Cont. To mobilize well below his baseline & benefits from cont. PT services to address. SUBJECTIVE:   Mr. Lexx Swartz states, \"I didn't think I would see you today. \"    SOCIAL HISTORY/ LIVING ENVIRONMENT: Patient lives in a 2 story home (basement) on the main level with his spouse. His son lives \"200 feet from me. \" He typically is independent with ambulation/ADLs and able to perform yard work at baseline, however does admit to balance challenges even at baseline and experience a fall prior to admission.    Home Environment: Private residence  # Steps to Enter: 1  One/Two Story Residence: Two story, live on 1st floor  Living Alone: No  [place]: Spouse/Significant Other/Partner, Child(dileep)  OBJECTIVE:     PAIN: VITAL SIGNS: LINES/DRAINS:   Pre Treatment:  no c/o pain  Post Treatment: 0/10 Airvo 50L/75%  HR   O2 100%-78% with stand pivot transfer using RW;  Recovers to 90% within 1 minute   O2 Device: Heated, Hi flow nasal cannula     MOBILITY: I Mod I S SBA CGA Min Mod Max Total  NT x2 Comments:   Bed Mobility    Rolling [] [] [] [] [] [] [] [] [] [x] [] Pt. Up in chair upon arrival   Supine to Sit [] [] [] [] [] [] [] [] [] [x] []    Scooting [] [] [] [] [] [] [] [] [] [x] []    Sit to Supine [] [] [] [] [] [] [] [] [] [x] []    Transfers    Sit to Stand [] [] [] [] [] [x] [] [] [] [] []    Bed to Chair [] [] [] [] [] [x] [] [] [] [] [] Using RW   Stand to Sit [] [] [] [] [] [x] [] [] [] [] []    I=Independent, Mod I=Modified Independent, S=Supervision, SBA=Standby Assistance, CGA=Contact Guard Assistance,   Min=Minimal Assistance, Mod=Moderate Assistance, Max=Maximal Assistance, Total=Total Assistance, NT=Not Tested    GAIT: I Mod I S SBA CGA Min Mod Max Total  NT x2 Comments:   Level of Assistance [] [] [] [] [] [x] [x] [] [] [] []    Distance 3' x2    DME Rolling Walker and Gait Belt    Gait Quality Pt. impusive w/ gait w/ flexed posture & tendency to hold too far anteriorly, needing cues to for slowed pace & safety    I=Independent, Mod I=Modified Independent, S=Supervision, SBA=Standby Assistance, CGA=Contact Guard Assistance,   Min=Minimal Assistance, Mod=Moderate Assistance, Max=Maximal Assistance, Total=Total Assistance, NT=Not Tested    PLAN:   FREQUENCY/DURATION: PT Plan of Care: 3 times/week for duration of hospital stay or until stated goals are met, whichever comes first.  TREATMENT:     TREATMENT:   ($$ Therapeutic Activity: 8-22 mins  $$ Therapeutic Exercises: 8-22 mins    )    Therapeutic Exercise: ( 15 minutes):  Exercises per grid below to improve mobility, strength and endurance. Required minimal visual and verbal cues to promote proper body mechanics.   Progressed complexity of movement as indicated. Pt. Performed seated LE exercises x10 unsupported on EOB to promote core engagement:  SEATED EXERCISES Sets Reps Comments   Ankle Pumps 1 10    Hip Flexion 1 10    Long Arc Quads 1 10    B shoulder flexion 1 10    B shoulder horizontal abduction 1 10    B punches 1 10        Therapeutic Activity: (  17 minutes): Therapeutic activities including Bed transfers, sitting EOB and standing to take a few steps along EOB to improve mobility, strength and balance. Required minimal   to promote static and dynamic balance in standing. Pt. Amb. x3' to/from bed using RW to perform exercises sitting on EOB to increase mobility within the session as well as to include core activation. Pt. Required min A for mobility tasks this session. Impulsive.        AFTER TREATMENT POSITION/PRECAUTIONS:  Chair and Needs within reach    INTERDISCIPLINARY COLLABORATION:  RN/PCT and PT/PTA    TOTAL TREATMENT DURATION:  PT Patient Time In/Time Out  Time In: 1310  Time Out: 500 [address], PT

## 2021-01-20 NOTE — PROGRESS NOTES
KAUR FRANCIS: patient currently on 50/75 Airvo. Discharge needs unclear at this time. Will reevaluate when oxygen demands have decreased. Case Management will continue to follow.

## 2021-01-20 NOTE — DIABETES MGMT
Patient's blood glucose ranged 200-285 yesterday with patient receiving Humalog 14 units. Blood glucose 200 this morning. Provider could consider small basal insulin dose if tighter control is desired as fasting glucose is consistently elevated.

## 2021-01-20 NOTE — PROGRESS NOTES
SBAR received from [facility] pt in bed resting dyspnea on exertion noted. O2 in place. Lung sounds are course with wheezing noted. abd is soft bowel sounds are active. Skin intact no new issues noted. Safety measures in place will continue to monitor.

## 2021-01-20 NOTE — PROGRESS NOTES
Hospitalist Progress Note     Admit Date:  2020  1:18 AM   Name:  Kajal Gay   Age:  67 y.o.  :  1945   MRN:  [id number]   PCP:  Elan Moore MD  Treatment Team: Attending Provider: Pola Hansen MD; Utilization Review: Lucia Belcher RN; Tech: Macy Eisenmenger; Hospitalist: Matilde Sher NP; Care Manager: Ramya Avalos RN; Physician Assistant: Pamela Soriano, [address] [place], [place] [address]; Primary Nurse: Cande Baum; Physical Therapist: Artis Amos PT    Subjective:     66 yo CM with past history of CAD s/p stents, ischemic cardiomyopathy, PAD (on Plavix and cilostazo), COPD, DM type II, HTN, HLD presents via EMS after fall at home and was admitted due to acute respiratory failure due to COVID and had an episode of VTach. Family and pt refused remdesivir and convalescent plasma. Patient oxygen requirement has been worsening. Initially he was on 40 to 60 L airvo BiPAP at night. Now for the past 2 days patient is on CPAP 75 - 100%. Unable to wean him to the OptiFlow. Pulmonology consulted and following. 0118 CXR shows stable bilat infilatrates.  transferred out of ICU.        Progress Note: Pt seen and examined. He was able to get OOB to chair. Per nursing, he had immediate desaturation with the move, but recovered once seated. Patient states he is not SOB while in chair and is eager to get outside and work. He notes that he has had cough productive of sputum, brownish/red in color. He is feeling better today, denies F/C. Did not use CPAP overnight. Is currently on 40L via airvo with sats in the mid-90s while seated.        Objective:     Patient Vitals for the past 24 hrs:   Temp Pulse Resp BP SpO2   21 0812 98.7 °F (37.1 °C) 77 20 132/65 97 %   21 0328 98.5 °F (36.9 °C) 78 20 (!) 137/91 97 %   21 2325 98.3 °F (36.8 °C) 74 20 117/60 99 %   01/19/21 2031     96 %   21  96      21 1950 98.6 °F (37 °C) 89 20 105/74 97 % 01/19/21 1911  94      01/19/21 1608 98.3 °F (36.8 °C) 83 19 110/68 96 %   01/19/21 1305     95 %   01/19/21 1135 97.8 °F (36.6 °C) (!) 54 19 120/66 97 %     Oxygen Therapy  O2 Sat (%): 97 % (01/20/21 0812)  Pulse via Oximetry: 70 beats per minute (01/19/21 2031)  O2 Device: Heated; Hi flow nasal cannula (01/19/21 2031)  O2 Flow Rate (L/min): 50 l/min (01/19/21 2031)  O2 Temperature: 87.8 °F (31 °C) (01/19/21 2031)  FIO2 (%): 75 % (01/19/21 2031)    Intake/Output Summary (Last 24 hours) at 1/20/2021 1029  Last data filed at 1/20/2021 0328  Gross per 24 hour   Intake 360 ml   Output 1050 ml   Net -690 ml         General:    Well nourished. Alert. Generalized weakness, obese    CV:   RRR. No murmur, rub, or gallop. Lungs:   Coarse bilat, improved respiratory effort. Able to cough rust colored sputum  Abdomen:   Soft, nontender, nondistended. Extremities: Warm and dry. No cyanosis or edema. Skin:     No rashes or jaundice.    Neuro:  No gross focal deficits    Data Review:  I have reviewed all labs, meds, telemetry events, and studies from the last 24 hours:    Recent Results (from the past 24 hour(s))   GLUCOSE, POC    Collection Time: 01/19/21 11:08 AM   Result Value Ref Range    Glucose (POC) 285 (H) 65 - 100 mg/dL   GLUCOSE, POC    Collection Time: 01/19/21  3:47 PM   Result Value Ref Range    Glucose (POC) 240 (H) 65 - 100 mg/dL   D DIMER    Collection Time: 01/19/21  6:59 PM   Result Value Ref Range    D DIMER 1.78 (H) <0.56 ug/ml(FEU)   GLUCOSE, POC    Collection Time: 01/19/21  8:36 PM   Result Value Ref Range    Glucose (POC) 247 (H) 65 - 100 mg/dL   GLUCOSE, POC    Collection Time: 01/20/21  5:41 AM   Result Value Ref Range    Glucose (POC) 200 (H) 65 - 361 mg/dL   METABOLIC PANEL, COMPREHENSIVE    Collection Time: 01/20/21  5:55 AM   Result Value Ref Range    Sodium 132 (L) 136 - 145 mmol/L    Potassium 3.9 3.5 - 5.1 mmol/L    Chloride 92 (L) 98 - 107 mmol/L    CO2 37 (H) 21 - 32 mmol/L Anion gap 3 (L) 7 - 16 mmol/L    Glucose 192 (H) 65 - 100 mg/dL    BUN 20 8 - 23 MG/DL    Creatinine 0.94 0.8 - 1.5 MG/DL    GFR est AA >60 >60 ml/min/1.73m2    GFR est non-AA >60 >60 ml/min/1.73m2    Calcium 8.6 8.3 - 10.4 MG/DL    Bilirubin, total 0.5 0.2 - 1.1 MG/DL    ALT (SGPT) 162 (H) 12 - 65 U/L    AST (SGOT) 74 (H) 15 - 37 U/L    Alk. phosphatase 91 50 - 136 U/L    Protein, total 6.5 6.3 - 8.2 g/dL    Albumin 1.9 (L) 3.2 - 4.6 g/dL    Globulin 4.6 (H) 2.3 - 3.5 g/dL    A-G Ratio 0.4 (L) 1.2 - 3.5     CBC WITH AUTOMATED DIFF    Collection Time: 01/20/21  5:55 AM   Result Value Ref Range    WBC 7.0 4.3 - 11.1 K/uL    RBC 2.87 (L) 4.23 - 5.6 M/uL    HGB 9.2 (L) 13.6 - 17.2 g/dL    HCT 28.6 (L) 41.1 - 50.3 %    MCV 99.7 (H) 79.6 - 97.8 FL    MCH 32.1 26.1 - 32.9 PG    MCHC 32.2 31.4 - 35.0 g/dL    RDW 14.3 11.9 - 14.6 %    PLATELET 755 495 - 711 K/uL    MPV 10.5 9.4 - 12.3 FL    ABSOLUTE NRBC 0.00 0.0 - 0.2 K/uL    DF AUTOMATED      NEUTROPHILS 80 (H) 43 - 78 %    LYMPHOCYTES 10 (L) 13 - 44 %    MONOCYTES 8 4.0 - 12.0 %    EOSINOPHILS 1 0.5 - 7.8 %    BASOPHILS 0 0.0 - 2.0 %    IMMATURE GRANULOCYTES 1 0.0 - 5.0 %    ABS. NEUTROPHILS 5.6 1.7 - 8.2 K/UL    ABS. LYMPHOCYTES 0.7 0.5 - 4.6 K/UL    ABS. MONOCYTES 0.6 0.1 - 1.3 K/UL    ABS. EOSINOPHILS 0.1 0.0 - 0.8 K/UL    ABS. BASOPHILS 0.0 0.0 - 0.2 K/UL    ABS. IMM.  GRANS. 0.1 0.0 - 0.5 K/UL        All Micro Results     Procedure Component Value Units Date/Time    CULTURE, BLOOD [[id number]] Collected: 01/02/21 0843    Order Status: Completed Specimen: Blood Updated: 01/07/21 1045     Special Requests: --        RIGHT  Antecubital       Culture result: NO GROWTH 5 DAYS       CULTURE, BLOOD [[id number]] Collected: 01/02/21 0848    Order Status: Completed Specimen: Blood Updated: 01/07/21 1045     Special Requests: --        LEFT  Antecubital       Culture result: NO GROWTH 5 DAYS       CULTURE, BLOOD [[id number]] Collected: 12/31/20 0309    Order Status: Completed Specimen: Blood Updated: 01/05/21 1038     Special Requests: --        RIGHT  HAND       Culture result: NO GROWTH 5 DAYS       CULTURE, BLOOD [[id number]]  (Abnormal) Collected: 12/31/20 0549    Order Status: Completed Specimen: Blood Updated: 01/03/21 0736     Special Requests: --        NO SPECIAL REQUESTS  RIGHT  FOREARM       GRAM STAIN GRAM POSITIVE COCCI         AEROBIC BOTTLE POSITIVE               RESULTS VERIFIED, PHONED TO AND READ BACK BY GALLITO DSOUZA @ 0451 1/1/21 MM           Culture result:       ALPHA STREPTOCOCCUS, NOT S. PNEUMONIAE                  STAPHYLOCOCCUS SPECIES, COAGULASE NEGATIVE            REFER TO Samantha Prasad [id number]            THIS ORGANISM MAY BE INDICATIVE OF CULTURE CONTAMINATION, HOWEVER, CLINICAL CORRELATION NEEDS TO BE EVALUATED, AS EACH CASE IS UNIQUE. BLOOD CULTURE ID PANEL [[id number]]  (Abnormal) Collected: 12/31/20 0549    Order Status: Completed Specimen: Blood Updated: 01/01/21 0801     Acc. no. from Micro Order [id number]     Staphylococcus Detected        Comment: RESULTS VERIFIED, PHONED TO AND READ BACK BY  GALLITO DSOUZA @ 0451 1/1/21 MM          Streptococcus Detected        Comment: RESULTS VERIFIED, PHONED TO AND READ BACK BY  GALLITO DSOUZA @ 0451 1/1/21 MM          mecA (Methicillin-Resistance Genes) NOT DETECTED        INTERPRETATION       Multiple organisms detected. Results do not replace susceptibility testing. No results found for this visit on 12/31/20.     Current Meds:  Current Facility-Administered Medications   Medication Dose Route Frequency    albuterol (PROVENTIL HFA, VENTOLIN HFA, PROAIR HFA) inhaler 2 Puff  2 Puff Inhalation TID RT    budesonide-formoteroL (SYMBICORT) 160-4.5 mcg/actuation HFA inhaler 2 Puff  2 Puff Inhalation BID RT    guaiFENesin ER (MUCINEX) tablet 1,200 mg  1,200 mg Oral Q12H    enoxaparin (LOVENOX) injection 70 mg  70 mg SubCUTAneous Q12H    furosemide (LASIX) injection 20 mg  20 mg IntraVENous BID    loratadine (CLARITIN) tablet 10 mg  10 mg Oral DAILY    sodium chloride (NS) flush 5-40 mL  5-40 mL IntraVENous Q8H    sodium chloride (NS) flush 5-40 mL  5-40 mL IntraVENous PRN    acetaminophen (TYLENOL) tablet 650 mg  650 mg Oral Q6H PRN    Or    acetaminophen (TYLENOL) suppository 650 mg  650 mg Rectal Q6H PRN    polyethylene glycol (MIRALAX) packet 17 g  17 g Oral DAILY PRN    albuterol (PROVENTIL VENTOLIN) nebulizer solution 2.5 mg  2.5 mg Nebulization Q4H PRN    aspirin delayed-release tablet 81 mg  81 mg Oral QHS    clopidogreL (PLAVIX) tablet 75 mg  75 mg Oral DAILY    [Held by provider] hydroCHLOROthiazide (HYDRODIURIL) tablet 12.5 mg  12.5 mg Oral DAILY    montelukast (SINGULAIR) tablet 10 mg  10 mg Oral QHS    rosuvastatin (CRESTOR) tablet 10 mg  10 mg Oral QHS    insulin lispro (HUMALOG) injection   SubCUTAneous AC&HS    [Held by provider] amLODIPine/valsartan (EXFORGE) 10/320 mg   Oral DAILY    magnesium oxide (MAG-OX) tablet 400 mg  400 mg Oral DAILY    0.9% sodium chloride infusion 250 mL  250 mL IntraVENous PRN       Other Studies (last 24 hours):  Xr Chest Sngl V    Result Date: 1/20/2021  EXAM: Chest x-ray. INDICATION: Dyspnea. Covid 19. COMPARISON: January 18, 2021. TECHNIQUE: Frontal view chest x-ray. FINDINGS: Cardiomegaly and bilateral lung infiltrates are unchanged. No pneumothorax or pleural effusion is seen. No interval change.       Assessment and Plan:     Hospital Problems as of 1/20/2021 Date Reviewed: 1/18/2021          Codes Class Noted - Resolved POA    CAP (community acquired pneumonia) ICD-10-CM: J18.9  ICD-9-CM: 486  12/31/2020 - Present Yes        Monomorphic ventricular tachycardia ([facility] ICD-10-CM: I47.2  ICD-9-CM: 427.1  12/31/2020 - Present Yes        COVID-19 ICD-10-CM: U07.1  ICD-9-CM: 079.89  12/31/2020 - Present Yes        Elevated troponin ICD-10-CM: R77.8  ICD-9-CM: 790.6  12/31/2020 - Present Yes        PAD (peripheral artery disease) ([facility] ICD-10-CM: I73.9  ICD-9-CM: 443.9  11/10/2015 - Present Yes        Hyperlipidemia ICD-10-CM: E78.5  ICD-9-CM: 272.4  11/10/2015 - Present Yes        COPD (chronic obstructive pulmonary disease) ([facility] ICD-10-CM: J44.9  ICD-9-CM: 496  4/10/2015 - Present Yes        * (Principal) Acute respiratory failure with hypoxia (HCC) ICD-10-CM: J96.01  ICD-9-CM: 518.81  10/23/2014 - Present Yes        AGUSTIN (acute kidney injury) ([facility] (Chronic) ICD-10-CM: N17.9  ICD-9-CM: 584.9  9/15/2014 - Present Yes        DM (diabetes mellitus) type II, controlled, with peripheral vascular disorder ([facility] (Chronic) ICD-10-CM: E11.51  ICD-9-CM: 250.70, 443.81  9/11/2014 - Present Yes        Coronary artery disease involving native coronary artery of native heart without angina pectoris ICD-10-CM: I25.10  ICD-9-CM: 414.01  10/12/2011 - Present Yes        Morbid obesity ([facility] (Chronic) ICD-10-CM: E66.01  ICD-9-CM: 278.01  10/12/2011 - Present Yes              Plan:      Acute respiratory failure with hypoxia secondary to COVID-19 infection (Dx 12/31):  - Pulmonology signed off, difficult to wean patient, -transferred to ICU 1/15, transferred out 1/18. He continues to need HFNC, but he has decreased to 50L with FiO2 of 75% over the last 24 hours. Continues to show improvement. Refusing CPAP at night. Appears to be retaining per labs. Continue to encourage CPAP use at night. - Completed Decadron  - Refused Remdesivir and convalescent plasma  - Continue home aerosols  - Completed antibiotics for possible CAP  - Wean oxygen as appropriate  - Continue Lasix  -CXR 01/18 stable       Monomorphic ventricular tachycardia:  - Patient found to have monomorphic VT with EMS, converted on his own  - Given Mag in ER  - Cardiology saw him in ER and recommended correcting electrolytes  - Start Amio if it happens again  - Cardiology signed off, didn't write a note?   - Remote tele, no events since        Bacteremia due to gram positive bacteria:  - Initial blood cultures 12/23 with strep + staph but both common contaminants  - Repeat cultures NGTD        AGUSTIN:  - Resolved       Elevated troponin:  - Troponin 72 --> 425 --> 391  - No acute CP  - ? COVID related vs VT  - Continue ASA + Plavix  - Cardiology assess in ER(?) -->  Previous attending has spoken to them and they think it's COVID       DM (diabetes mellitus) type II:  - A1C on 12/16/20 was 6.8  - BS 200s, Lantus has been on hold  - Cont on SSI  - Was on metformin and Januvia at home. - Consider Lantus       CAD, PAD:   - Stable  - Continue ASA + Plavix   - Continue Crestor         Elevated LFTs:  -abdominal U/S hepatomegaly   -trending - appear stable  - Possible fatty liver disease vs due to acute illness    DC planning/Dispo:    - will likely need STR.  Needs evaluation once able to tolerate ambulation without Airvo    Diet:  DIET NUTRITIONAL SUPPLEMENTS  DIET NUTRITIONAL SUPPLEMENTS  DIET DIABETIC CONSISTENT CARB  DVT ppx:  Lovenox  Care d/w patient, Dr. Jon Mckeon, care team.     Signed:  LINDA Hdz

## 2021-01-20 NOTE — PROGRESS NOTES
Bedside report received from Nito Crystal RN. Pt sitting up in recliner. On Airvo 75%/50L  Oxygen sat 94%. Call light within reach. No distress. Instructed pt to call should needs arise. Pt voiced understanding to call should needs arise.

## 2021-01-21 LAB
ALBUMIN SERPL-MCNC: 1.9 G/DL (ref 3.2–4.6)
ALBUMIN/GLOB SERPL: 0.4 {RATIO} (ref 1.2–3.5)
ALP SERPL-CCNC: 90 U/L (ref 50–136)
ALT SERPL-CCNC: 157 U/L (ref 12–65)
ANION GAP SERPL CALC-SCNC: 3 MMOL/L (ref 7–16)
AST SERPL-CCNC: 56 U/L (ref 15–37)
BASOPHILS # BLD: 0.1 K/UL (ref 0–0.2)
BASOPHILS NFR BLD: 1 % (ref 0–2)
BILIRUB SERPL-MCNC: 0.4 MG/DL (ref 0.2–1.1)
BUN SERPL-MCNC: 16 MG/DL (ref 8–23)
CALCIUM SERPL-MCNC: 8.4 MG/DL (ref 8.3–10.4)
CHLORIDE SERPL-SCNC: 93 MMOL/L (ref 98–107)
CO2 SERPL-SCNC: 36 MMOL/L (ref 21–32)
CREAT SERPL-MCNC: 0.83 MG/DL (ref 0.8–1.5)
DIFFERENTIAL METHOD BLD: ABNORMAL
EOSINOPHIL # BLD: 0.1 K/UL (ref 0–0.8)
EOSINOPHIL NFR BLD: 2 % (ref 0.5–7.8)
ERYTHROCYTE [DISTWIDTH] IN BLOOD BY AUTOMATED COUNT: 14.6 % (ref 11.9–14.6)
GLOBULIN SER CALC-MCNC: 4.6 G/DL (ref 2.3–3.5)
GLUCOSE BLD STRIP.AUTO-MCNC: 221 MG/DL (ref 65–100)
GLUCOSE BLD STRIP.AUTO-MCNC: 333 MG/DL (ref 65–100)
GLUCOSE BLD STRIP.AUTO-MCNC: 334 MG/DL (ref 65–100)
GLUCOSE BLD STRIP.AUTO-MCNC: 348 MG/DL (ref 65–100)
GLUCOSE SERPL-MCNC: 195 MG/DL (ref 65–100)
HCT VFR BLD AUTO: 28.6 % (ref 41.1–50.3)
HGB BLD-MCNC: 9.4 G/DL (ref 13.6–17.2)
IMM GRANULOCYTES # BLD AUTO: 0.1 K/UL (ref 0–0.5)
IMM GRANULOCYTES NFR BLD AUTO: 1 % (ref 0–5)
LYMPHOCYTES # BLD: 0.9 K/UL (ref 0.5–4.6)
LYMPHOCYTES NFR BLD: 15 % (ref 13–44)
MCH RBC QN AUTO: 32.1 PG (ref 26.1–32.9)
MCHC RBC AUTO-ENTMCNC: 32.9 G/DL (ref 31.4–35)
MCV RBC AUTO: 97.6 FL (ref 79.6–97.8)
MONOCYTES # BLD: 0.6 K/UL (ref 0.1–1.3)
MONOCYTES NFR BLD: 11 % (ref 4–12)
NEUTS SEG # BLD: 3.9 K/UL (ref 1.7–8.2)
NEUTS SEG NFR BLD: 69 % (ref 43–78)
NRBC # BLD: 0 K/UL (ref 0–0.2)
PLATELET # BLD AUTO: 154 K/UL (ref 150–450)
PMV BLD AUTO: 10.7 FL (ref 9.4–12.3)
POTASSIUM SERPL-SCNC: 3.7 MMOL/L (ref 3.5–5.1)
PROT SERPL-MCNC: 6.5 G/DL (ref 6.3–8.2)
RBC # BLD AUTO: 2.93 M/UL (ref 4.23–5.6)
SODIUM SERPL-SCNC: 132 MMOL/L (ref 136–145)
WBC # BLD AUTO: 5.7 K/UL (ref 4.3–11.1)

## 2021-01-21 PROCEDURE — 85025 COMPLETE CBC W/AUTO DIFF WBC: CPT

## 2021-01-21 PROCEDURE — 74011636637 HC RX REV CODE- 636/637: Performed by: INTERNAL MEDICINE

## 2021-01-21 PROCEDURE — 74011250636 HC RX REV CODE- 250/636: Performed by: INTERNAL MEDICINE

## 2021-01-21 PROCEDURE — 36415 COLL VENOUS BLD VENIPUNCTURE: CPT

## 2021-01-21 PROCEDURE — 74011636637 HC RX REV CODE- 636/637: Performed by: NURSE PRACTITIONER

## 2021-01-21 PROCEDURE — 74011250637 HC RX REV CODE- 250/637: Performed by: INTERNAL MEDICINE

## 2021-01-21 PROCEDURE — 94760 N-INVAS EAR/PLS OXIMETRY 1: CPT

## 2021-01-21 PROCEDURE — 65270000029 HC RM PRIVATE

## 2021-01-21 PROCEDURE — 94762 N-INVAS EAR/PLS OXIMTRY CONT: CPT

## 2021-01-21 PROCEDURE — 82962 GLUCOSE BLOOD TEST: CPT

## 2021-01-21 PROCEDURE — 77010033711 HC HIGH FLOW OXYGEN

## 2021-01-21 PROCEDURE — 94640 AIRWAY INHALATION TREATMENT: CPT

## 2021-01-21 PROCEDURE — 2709999900 HC NON-CHARGEABLE SUPPLY

## 2021-01-21 PROCEDURE — 80053 COMPREHEN METABOLIC PANEL: CPT

## 2021-01-21 RX ORDER — INSULIN GLARGINE 100 [IU]/ML
10 INJECTION, SOLUTION SUBCUTANEOUS DAILY
Status: DISCONTINUED | OUTPATIENT
Start: 2021-01-21 | End: 2021-01-26 | Stop reason: HOSPADM

## 2021-01-21 RX ADMIN — ENOXAPARIN SODIUM 70 MG: 80 INJECTION SUBCUTANEOUS at 21:12

## 2021-01-21 RX ADMIN — INSULIN LISPRO 8 UNITS: 100 INJECTION, SOLUTION INTRAVENOUS; SUBCUTANEOUS at 22:00

## 2021-01-21 RX ADMIN — BUDESONIDE AND FORMOTEROL FUMARATE DIHYDRATE 2 PUFF: 160; 4.5 AEROSOL RESPIRATORY (INHALATION) at 21:25

## 2021-01-21 RX ADMIN — LORATADINE 10 MG: 10 TABLET ORAL at 08:44

## 2021-01-21 RX ADMIN — GUAIFENESIN 1200 MG: 600 TABLET ORAL at 08:44

## 2021-01-21 RX ADMIN — FUROSEMIDE 20 MG: 10 INJECTION INTRAMUSCULAR; INTRAVENOUS at 08:44

## 2021-01-21 RX ADMIN — ASPIRIN 81 MG: 81 TABLET, COATED ORAL at 21:12

## 2021-01-21 RX ADMIN — ROSUVASTATIN 10 MG: 10 TABLET, FILM COATED ORAL at 21:12

## 2021-01-21 RX ADMIN — CLOPIDOGREL BISULFATE 75 MG: 75 TABLET ORAL at 08:44

## 2021-01-21 RX ADMIN — MAGNESIUM GLUCONATE 500 MG ORAL TABLET 400 MG: 500 TABLET ORAL at 08:44

## 2021-01-21 RX ADMIN — Medication 1 EACH: at 10:03

## 2021-01-21 RX ADMIN — ALBUTEROL SULFATE 2 PUFF: 108 INHALANT RESPIRATORY (INHALATION) at 21:25

## 2021-01-21 RX ADMIN — GUAIFENESIN 1200 MG: 600 TABLET ORAL at 21:12

## 2021-01-21 RX ADMIN — MONTELUKAST SODIUM 10 MG: 10 TABLET, FILM COATED ORAL at 21:12

## 2021-01-21 RX ADMIN — Medication 10 ML: at 11:27

## 2021-01-21 RX ADMIN — Medication 10 ML: at 21:12

## 2021-01-21 RX ADMIN — ALBUTEROL SULFATE 2 PUFF: 108 INHALANT RESPIRATORY (INHALATION) at 15:20

## 2021-01-21 RX ADMIN — Medication 10 ML: at 06:08

## 2021-01-21 RX ADMIN — INSULIN LISPRO 4 UNITS: 100 INJECTION, SOLUTION INTRAVENOUS; SUBCUTANEOUS at 06:08

## 2021-01-21 RX ADMIN — FUROSEMIDE 20 MG: 10 INJECTION INTRAMUSCULAR; INTRAVENOUS at 16:18

## 2021-01-21 RX ADMIN — INSULIN LISPRO 8 UNITS: 100 INJECTION, SOLUTION INTRAVENOUS; SUBCUTANEOUS at 11:19

## 2021-01-21 RX ADMIN — ENOXAPARIN SODIUM 70 MG: 80 INJECTION SUBCUTANEOUS at 10:29

## 2021-01-21 RX ADMIN — ALBUTEROL SULFATE 2 PUFF: 108 INHALANT RESPIRATORY (INHALATION) at 08:01

## 2021-01-21 RX ADMIN — INSULIN GLARGINE 10 UNITS: 100 INJECTION, SOLUTION SUBCUTANEOUS at 10:29

## 2021-01-21 RX ADMIN — INSULIN LISPRO 8 UNITS: 100 INJECTION, SOLUTION INTRAVENOUS; SUBCUTANEOUS at 17:02

## 2021-01-21 RX ADMIN — BUDESONIDE AND FORMOTEROL FUMARATE DIHYDRATE 2 PUFF: 160; 4.5 AEROSOL RESPIRATORY (INHALATION) at 08:01

## 2021-01-21 NOTE — DIABETES MGMT
Blood glucose ranged 192-298 yesterday with patient receiving Humalog 22 units. Blood glucose 221 this morning. Hgb 9.4. Current regimen Humalog SSI. Fasting glucose has been consistently elevated Perfect Served provider to ask for order for basal insulin.

## 2021-01-21 NOTE — PROGRESS NOTES
SBAR received from [facility] pt in bed resting rr are even airvo in place lung sounds are course with some wheezing noted. Pt denies increased SOB. No distress noted. abd is soft bowel sounds are active. Skin intact safety measures in place will continue to monitor.

## 2021-01-21 NOTE — PROGRESS NOTES
End of Shift Note    Pt sleeping in bed at this time. Receiving supplemental oxygen at 50 L/min at 75% via Airvo. Continuous pulse oximeter reads 95%. NAD noted. AAOX4. Pugh catheter present, patent, and draining sanya urine. Bed locked and low. Call light within reach. Report given to Monica Mckeon RN.

## 2021-01-21 NOTE — PROGRESS NOTES
SBAR received from April Enola, [facility]. Patient stable, sitting in chair sleeping, in no apparent distress. Receiving supplemental oxygen at 50 L/min at 75% via Airvo Respirations even and unlabored. Bed locked and low. Call light within reach. Droplet plus precautions maintained.

## 2021-01-21 NOTE — PROGRESS NOTES
Progress Note  Date:2021       Room:[facility]  Patient Name:Denny Faulkner     YOB: 1945     Age:75 y.o. Subjective    Subjective   66 yo CM with past history of CAD s/p stents, ischemic cardiomyopathy, PAD (on Plavix and cilostazo), COPD, DM type II, HTN, HLD presents via EMS after fall at home and was admitted due to acute respiratory failure due to COVID and had an episode of VTach. Family has refused for remdesivir and convalescent plasma. Patient oxygen requirement has been worsening. Initially he was on 40 to 60 L airvo BiPAP at night. Now for the past 2 days patient is on CPAP 75 - 100%. Unable to wean him to the OptiFlow. Pulmonology consulted and following.    :   Patient alert and oriented x3.  92% on Airvo 50L/85%. , sitting up in chair. No leukocytosis today. Afebrile. Review of Systems   Constitutional:  denies fever, No chills, night sweats, weight loss, weight gain  Eyes:  Denies problems with eye pain, erythema, blurred vision, or visual field loss. ENTM:  Denies sore throat, deniesloss of taste or smell  Lymph:  Denies swollen glands. Cardiac:  No chest pain, pressure, discomfort, palpitations, orthopnea, murmurs, or edema. GI:  No dysphagia, heartburn reflux, nausea/vomiting, diarrhea, abdominal pain, or bleeding. :Denies history of dysuria, hematuria, polyuria, or decreased urine output. MS:  No history of myalgias, some LBP, no muscle cramps. Skin:  No history of rashes, jaundice, cyanosis, nodules, or ulcers. Endo:  Negative for heat or cold intolerance. No polyuria/polydipsia  Psych:  Mild anxiety, No depression, insomnia, hallucinations. Neuro:  Denies AMS, persistent headache, decreased level of consciousness,or motor or sensory deficits.   Objective         Vitals Last 24 Hours:  TEMPERATURE:  Temp  Av.3 °F (36.8 °C)  Min: 97.5 °F (36.4 °C)  Max: 99.2 °F (37.3 °C)  RESPIRATIONS RANGE: Resp  Av  Min: 20 Max: 20  PULSE OXIMETRY RANGE: SpO2  Av.1 %  Min: 94 %  Max: 98 %  PULSE RANGE: Pulse  Av  Min: 74  Max: 96  BLOOD PRESSURE RANGE: Systolic (89SZI), GGF:394 , Min:110 , MWD:901   ; Diastolic (95TAA), DTO:27, Min:61, Max:82    I/O (24Hr): Intake/Output Summary (Last 24 hours) at 2021 1202  Last data filed at 2021 0944  Gross per 24 hour   Intake 960 ml   Output 1550 ml   Net -590 ml     Objective:  Vital signs: (most recent): Blood pressure 120/82, pulse 96, temperature 97.7 °F (36.5 °C), resp. rate 20, height 6' 1\" (1.854 m), weight 114.2 kg (251 lb 12.8 oz), SpO2 96 %. GENERAL: alert, cooperative, moderate resp distress, appears stated age  EYE: conjunctivae/corneas clear. PERRL. THROAT & NECK: normal and no erythema or exudates noted. LUNG: Breath sounds bilateral, bibasilar crackles, more clear today. HEART: regular rate and rhythm, S1S2, no murmur, no JVD  ABDOMEN: soft, non-tender, non-distended. Bowel sounds normal.   EXTREMITIES:  No edema, 2+ pedal/radial pulses bilaterally  SKIN: no rash or abnormalities  NEUROLOGIC: A&Ox3. Cranial nerves 2-12 grossly intact. Labs/Imaging/Diagnostics    Labs:  CBC:  Recent Labs     21  0619 21  0555 21  06   WBC 5.7 7.0 8.5   RBC 2.93* 2.87* 2.97*   HGB 9.4* 9.2* 9.3*   HCT 28.6* 28.6* 29.4*   MCV 97.6 99.7* 99.0*   RDW 14.6 14.3 14.4    185 221     CHEMISTRIES:  Recent Labs     21  0619 21  0555 21  06   * 132* 134*   K 3.7 3.9 3.7   CL 93* 92* 94*   CO2 36* 37* 37*   BUN 16 20 21   CA 8.4 8.6 8.6   PT/INR:No results for input(s): INR, INREXT, INREXT in the last 72 hours. No lab exists for component: PROTIME  APTT:No results for input(s): APTT in the last 72 hours.   LIVER PROFILE:  Recent Labs     21  0619 21  0555 21  0621   AST 56* 74* 67*   * 162* 152*     Lab Results   Component Value Date/Time    ALT (SGPT) 157 (H) 2021 06:19 AM    AST (SGOT) 56 (H) 01/21/2021 06:19 AM    Alk. phosphatase 90 01/21/2021 06:19 AM    Bilirubin, direct <0.1 11/24/2014 05:00 AM    Bilirubin, total 0.4 01/21/2021 06:19 AM       Imaging Last 24 Hours:  No results found.   Assessment//Plan   Principal Problem:    Acute respiratory failure with hypoxia ([address] (10/23/2014)    Active Problems:    Coronary artery disease involving native coronary artery of native heart without angina pectoris (10/12/2011)      Morbid obesity ([facility] (10/12/2011)      DM (diabetes mellitus) type II, controlled, with peripheral vascular disorder ([facility] (9/11/2014)      AGUSTIN (acute kidney injury) ([facility] (9/15/2014)      COPD (chronic obstructive pulmonary disease) ([facility] (4/10/2015)      PAD (peripheral artery disease) ([facility] (11/10/2015)      Hyperlipidemia (11/10/2015)      CAP (community acquired pneumonia) (12/31/2020)      Monomorphic ventricular tachycardia ([facility] (12/31/2020)      COVID-19 (12/31/2020)      Elevated troponin (12/31/2020)      Assessment & Plan    Acute respiratory failure with hypoxia secondary to COVID-19 infection (Dx 12/31):  -pulmonology on standby 1/19  -on airvo, CPAP at night  - Completed Decadron  - Refused Remdesivir and convalescent plasma   - Continue home aerosols  - Completed antibiotics for possible CAP  - Wean oxygen as appropriate  - Continue Lasix       Monomorphic ventricular tachycardia:  - Patient found to have monomorphic VT with EMS, converted on his own  - Given Mag in ER  - Cardiology saw him in ER and recommended correcting electrolytes  - Start Amio if it happens again  - Cardiology signed off, didn't write a note?       Bacteremia due to gram positive bacteria:  - Initial blood cultures 12/23 with strep + staph but both common contaminants  - Repeat cultures NGTD        AGUSTIN:  - Resolved       Elevated troponin:  - Troponin 72 --> 425 --> 391  - No acute CP  - ?COVID related vs VT  - Repeat troponin until trending down  - Continue ASA + Plavix  - Cardiology assess in ER(?) -->  Previous attending has spoken to them and they think it's COVID       DM (diabetes mellitus) type II:  - A1C on 12/16/20 was 6.8  - Blood sugars in 300s, patient is on steroids   - Increase Lantus to 35 units   - continue Humalog SSI       Coronary artery disease:  - Stable  - Continue ASA + Plavix  - Continue Crestor       COPD:  - No acute wheezing  - Continue home aerosols       PAD:  - Continue ASA + Plavix       Hyperlipidemia:   - Continue Crestor       Elevated LFTs:  -abdominal U/S  -trend    Electronically signed by Oswaldo Verdugo NP on 1/21/2021 at 10:17 AM

## 2021-01-22 ENCOUNTER — APPOINTMENT (OUTPATIENT)
Dept: GENERAL RADIOLOGY | Age: 76
DRG: 177 | End: 2021-01-22
Attending: INTERNAL MEDICINE
Payer: MEDICARE

## 2021-01-22 LAB
ALBUMIN SERPL-MCNC: 2 G/DL (ref 3.2–4.6)
ALBUMIN/GLOB SERPL: 0.5 {RATIO} (ref 1.2–3.5)
ALP SERPL-CCNC: 83 U/L (ref 50–136)
ALT SERPL-CCNC: 131 U/L (ref 12–65)
ANION GAP SERPL CALC-SCNC: 3 MMOL/L (ref 7–16)
AST SERPL-CCNC: 45 U/L (ref 15–37)
BASOPHILS # BLD: 0 K/UL (ref 0–0.2)
BASOPHILS NFR BLD: 1 % (ref 0–2)
BILIRUB SERPL-MCNC: 0.4 MG/DL (ref 0.2–1.1)
BUN SERPL-MCNC: 12 MG/DL (ref 8–23)
CALCIUM SERPL-MCNC: 8.5 MG/DL (ref 8.3–10.4)
CHLORIDE SERPL-SCNC: 92 MMOL/L (ref 98–107)
CO2 SERPL-SCNC: 38 MMOL/L (ref 21–32)
CREAT SERPL-MCNC: 0.89 MG/DL (ref 0.8–1.5)
DIFFERENTIAL METHOD BLD: ABNORMAL
EOSINOPHIL # BLD: 0.1 K/UL (ref 0–0.8)
EOSINOPHIL NFR BLD: 2 % (ref 0.5–7.8)
ERYTHROCYTE [DISTWIDTH] IN BLOOD BY AUTOMATED COUNT: 14.3 % (ref 11.9–14.6)
GLOBULIN SER CALC-MCNC: 4.4 G/DL (ref 2.3–3.5)
GLUCOSE BLD STRIP.AUTO-MCNC: 212 MG/DL (ref 65–100)
GLUCOSE BLD STRIP.AUTO-MCNC: 269 MG/DL (ref 65–100)
GLUCOSE BLD STRIP.AUTO-MCNC: 285 MG/DL (ref 65–100)
GLUCOSE BLD STRIP.AUTO-MCNC: 288 MG/DL (ref 65–100)
GLUCOSE SERPL-MCNC: 197 MG/DL (ref 65–100)
HCT VFR BLD AUTO: 27.6 % (ref 41.1–50.3)
HGB BLD-MCNC: 9.2 G/DL (ref 13.6–17.2)
IMM GRANULOCYTES # BLD AUTO: 0.1 K/UL (ref 0–0.5)
IMM GRANULOCYTES NFR BLD AUTO: 2 % (ref 0–5)
LYMPHOCYTES # BLD: 1 K/UL (ref 0.5–4.6)
LYMPHOCYTES NFR BLD: 16 % (ref 13–44)
MCH RBC QN AUTO: 32.2 PG (ref 26.1–32.9)
MCHC RBC AUTO-ENTMCNC: 33.3 G/DL (ref 31.4–35)
MCV RBC AUTO: 96.5 FL (ref 79.6–97.8)
MONOCYTES # BLD: 0.6 K/UL (ref 0.1–1.3)
MONOCYTES NFR BLD: 10 % (ref 4–12)
NEUTS SEG # BLD: 4.3 K/UL (ref 1.7–8.2)
NEUTS SEG NFR BLD: 70 % (ref 43–78)
NRBC # BLD: 0.03 K/UL (ref 0–0.2)
PLATELET # BLD AUTO: 159 K/UL (ref 150–450)
PMV BLD AUTO: 11 FL (ref 9.4–12.3)
POTASSIUM SERPL-SCNC: 3.6 MMOL/L (ref 3.5–5.1)
PROT SERPL-MCNC: 6.4 G/DL (ref 6.3–8.2)
RBC # BLD AUTO: 2.86 M/UL (ref 4.23–5.6)
SODIUM SERPL-SCNC: 133 MMOL/L (ref 136–145)
WBC # BLD AUTO: 6.1 K/UL (ref 4.3–11.1)

## 2021-01-22 PROCEDURE — 97110 THERAPEUTIC EXERCISES: CPT

## 2021-01-22 PROCEDURE — 77010033711 HC HIGH FLOW OXYGEN

## 2021-01-22 PROCEDURE — 97530 THERAPEUTIC ACTIVITIES: CPT

## 2021-01-22 PROCEDURE — 94640 AIRWAY INHALATION TREATMENT: CPT

## 2021-01-22 PROCEDURE — 82962 GLUCOSE BLOOD TEST: CPT

## 2021-01-22 PROCEDURE — 94762 N-INVAS EAR/PLS OXIMTRY CONT: CPT

## 2021-01-22 PROCEDURE — 85025 COMPLETE CBC W/AUTO DIFF WBC: CPT

## 2021-01-22 PROCEDURE — 36415 COLL VENOUS BLD VENIPUNCTURE: CPT

## 2021-01-22 PROCEDURE — 74011250637 HC RX REV CODE- 250/637: Performed by: INTERNAL MEDICINE

## 2021-01-22 PROCEDURE — 74011636637 HC RX REV CODE- 636/637: Performed by: NURSE PRACTITIONER

## 2021-01-22 PROCEDURE — 74011250636 HC RX REV CODE- 250/636: Performed by: INTERNAL MEDICINE

## 2021-01-22 PROCEDURE — 71045 X-RAY EXAM CHEST 1 VIEW: CPT

## 2021-01-22 PROCEDURE — 65270000029 HC RM PRIVATE

## 2021-01-22 PROCEDURE — 80053 COMPREHEN METABOLIC PANEL: CPT

## 2021-01-22 PROCEDURE — 74011636637 HC RX REV CODE- 636/637: Performed by: INTERNAL MEDICINE

## 2021-01-22 RX ADMIN — LORATADINE 10 MG: 10 TABLET ORAL at 09:26

## 2021-01-22 RX ADMIN — MONTELUKAST SODIUM 10 MG: 10 TABLET, FILM COATED ORAL at 21:04

## 2021-01-22 RX ADMIN — ALBUTEROL SULFATE 2 PUFF: 108 INHALANT RESPIRATORY (INHALATION) at 20:02

## 2021-01-22 RX ADMIN — GUAIFENESIN 1200 MG: 600 TABLET ORAL at 21:04

## 2021-01-22 RX ADMIN — Medication 10 ML: at 12:56

## 2021-01-22 RX ADMIN — INSULIN LISPRO 6 UNITS: 100 INJECTION, SOLUTION INTRAVENOUS; SUBCUTANEOUS at 21:10

## 2021-01-22 RX ADMIN — INSULIN LISPRO 6 UNITS: 100 INJECTION, SOLUTION INTRAVENOUS; SUBCUTANEOUS at 11:21

## 2021-01-22 RX ADMIN — ASPIRIN 81 MG: 81 TABLET, COATED ORAL at 21:04

## 2021-01-22 RX ADMIN — ROSUVASTATIN 10 MG: 10 TABLET, FILM COATED ORAL at 21:04

## 2021-01-22 RX ADMIN — ENOXAPARIN SODIUM 70 MG: 80 INJECTION SUBCUTANEOUS at 21:04

## 2021-01-22 RX ADMIN — BUDESONIDE AND FORMOTEROL FUMARATE DIHYDRATE 2 PUFF: 160; 4.5 AEROSOL RESPIRATORY (INHALATION) at 07:50

## 2021-01-22 RX ADMIN — BUDESONIDE AND FORMOTEROL FUMARATE DIHYDRATE 2 PUFF: 160; 4.5 AEROSOL RESPIRATORY (INHALATION) at 20:02

## 2021-01-22 RX ADMIN — MAGNESIUM GLUCONATE 500 MG ORAL TABLET 400 MG: 500 TABLET ORAL at 09:26

## 2021-01-22 RX ADMIN — INSULIN GLARGINE 10 UNITS: 100 INJECTION, SOLUTION SUBCUTANEOUS at 09:26

## 2021-01-22 RX ADMIN — INSULIN LISPRO 4 UNITS: 100 INJECTION, SOLUTION INTRAVENOUS; SUBCUTANEOUS at 06:16

## 2021-01-22 RX ADMIN — FUROSEMIDE 20 MG: 10 INJECTION INTRAMUSCULAR; INTRAVENOUS at 09:26

## 2021-01-22 RX ADMIN — ALBUTEROL SULFATE 2 PUFF: 108 INHALANT RESPIRATORY (INHALATION) at 14:20

## 2021-01-22 RX ADMIN — Medication 10 ML: at 21:04

## 2021-01-22 RX ADMIN — ALBUTEROL SULFATE 2 PUFF: 108 INHALANT RESPIRATORY (INHALATION) at 07:50

## 2021-01-22 RX ADMIN — INSULIN LISPRO 6 UNITS: 100 INJECTION, SOLUTION INTRAVENOUS; SUBCUTANEOUS at 16:15

## 2021-01-22 RX ADMIN — CLOPIDOGREL BISULFATE 75 MG: 75 TABLET ORAL at 09:26

## 2021-01-22 RX ADMIN — FUROSEMIDE 20 MG: 10 INJECTION INTRAMUSCULAR; INTRAVENOUS at 16:14

## 2021-01-22 RX ADMIN — GUAIFENESIN 1200 MG: 600 TABLET ORAL at 09:26

## 2021-01-22 RX ADMIN — ENOXAPARIN SODIUM 70 MG: 80 INJECTION SUBCUTANEOUS at 12:55

## 2021-01-22 RX ADMIN — Medication 5 ML: at 06:16

## 2021-01-22 NOTE — PROGRESS NOTES
Comprehensive Nutrition Assessment    Type and Reason for Visit: Reassess     Nutrition Recommendations/Plan:    Continue current diet.  Decrease Nepro to 1 meal/day. Nepro shakes provide 425 kcal and 19 gm PRO per bottle. Malnutrition Assessment:  Malnutrition Status: At risk for malnutrition (specify)(increased O2 demands limiting PO)    Nutrition Assessment:   Nutrition History: PTA pt was eating normally with 3 or so meals a day. Patient reports consistent weight based on weighing himself twice daily PTA. Nutrition Background: PMH: CAD s/p stents, ischemic cardiomyopathy, PAD,COPD, DM type II, HTN, HLD. Presented after fall at home and was admitted due to acute respiratory failure due to [place] and had an episode of VTach. Daily Update:  Spoke with pt via phone d/t isolation status. Pt reports eating all of his meals yesterday. RN verifies. He does state that he slept very well last night and woke up late, therefore, eating less of his breakfast today than he did yesterday. Pt states that he is not drinking Nepro shakes anymore since he is eating well. He reports that his taste and smell are back to normal. He has snacks in his room sent from home by his wife (PB crackers, applesauce). Main concern right now is RN coming to assist with a trip to the bathroom. Abdominal Status (last documented): Obese, Intact abdomen with Active  bowel sounds. Last BM 01/17/21. (BM x 1 on 1/17)   Pt has prn miralax ordered.    Pertinent Medications: 10 units Lantus q day, SSI  Pertinent Labs: Na 133, Glucose 197 mg/dl    Nutrition Related Findings:   NFPE deferred d/t isolation      Current Nutrition Therapies:  DIET NUTRITIONAL SUPPLEMENTS All Meals; Nepro ( )  DIET NUTRITIONAL SUPPLEMENTS Dinner, Breakfast; Nepro ( )  DIET DIABETIC CONSISTENT CARB Regular    Current Intake:   Average Meal Intake: % Average Supplement Intake: Unable to assess      Anthropometric Measures:  Height: 6' 1\" (185.4 cm)  Current Body Wt: 114.2 kg (251 lb 12.3 oz)(1/20), Weight source: Bed scale  BMI: 33.2, Obese class 1 (BMI 30.0-34.9)     Ideal Body Wt: 184 lbs (84 kg), 157 %  Usual Body Wt: (286-298# per EMR),            Estimated Daily Nutrient Needs:  Energy (kcal/day): 8458-3477 kcal/day (Kcal/kg(15-18 kcal/kg), Weight Used: Admission(131.1 kg-unspecified source))  Protein (g/day):  g/day Weight Used: ((20% daily needs))  Fluid (ml/day):   (1 ml/kcal)    Nutrition Diagnosis:   No nutrition diagnosis at this time   Nutrition Interventions:   Food and/or Nutrient Delivery: Continue current diet, Modify oral nutrition supplement     Coordination of Nutrition Care: Continue to monitor while inpatient  Plan of Care discussed with GALLITO Anderson. Goals:   Previous Goal Met: Goal(s) achieved  Active Goal: Continue to meet >75% of estimated needs via PO intake at time of follow-up (10 days)    Nutrition Monitoring and Evaluation:      Food/Nutrient Intake Outcomes: Food and nutrient intake  Physical Signs/Symptoms Outcomes: Biochemical data, Hemodynamic status    Discharge Planning:     Too soon to determine    Letty Guidry MS, RD, LD, [address]    Disaster Mode active

## 2021-01-22 NOTE — PROGRESS NOTES
SBAR received from [employer] pt in bed resting rr are even and unlabored. He does have dyspnea on exertion airvo 50/70 in place O2 sat is 95%. Crackles noted to BLL. abd is soft bowel sounds are active. Skin intact no new issues noted. Pugh in place with some old blood noted. Safety measures in place will continue to monitor.

## 2021-01-22 NOTE — DIABETES MGMT
Patient's blood glucose ranged 195-348 yesterday with patient receiving Lantus 10 units and Humalog 28 units. Blood glucose 212 this morning and 269 at lunch. Hgb 9.2. Na+ 133. Alb 2. Provider could consider a small dose of prandial insulin if tighter control is desired.

## 2021-01-22 NOTE — PROGRESS NOTES
ACUTE PHYSICAL THERAPY GOALS:  (Developed with and agreed upon by patient and/or caregiver.)  STG:  (1.)Mr. Mati Chew will move from supine to sit and sit to supine , scoot up and down and roll side to side with CONTACT GUARD ASSIST within 3 treatment day(s). 1/20/20201 - Goal not met due to slower than expected progress, cont. Current goal x7 additional days. (2.)Mr. Mati Chew will transfer from bed to chair and chair to bed with CONTACT GUARD ASSIST using the least restrictive device within 3 treatment day(s). 1/20/20201 - Goal not met due to slower than expected progress, cont. Current goal x7 additional days. (3.)Mr. Mati Chew will ambulate with CONTACT GUARD ASSIST for 40 feet with the least restrictive device within 3 treatment day(s). 1/20/20201 - D/C Goal   1/20/2021 New goal:  Mr. Mati Chew will ambulate w/ min A x10' w/ RW within 7 treatment days. (4.)Mr. Mati Chew will perform standing static and dynamic balance activities x 10 minutes with CONTACT GUARD ASSIST to improve safety within 3 treatment day(s). 1/20/20201 - D/C Goal secondary to slower than expected progress   1/20/2021 New goal:  Mr. Mati Chew will stand x1 minute w/ min A w/ B UE support in 7 days. (5.)Mr. Mati Chew will maintain stable vital signs throughout all functional mobility within 3 treatment days. 1/20/20201 - Goal not met due to slower than expected progress, cont. Current goal x7 additional days.       LTG:  (1.)Mr. Mati Chew will move from supine to sit and sit to supine , scoot up and down and roll side to side in bed with SUPERVISION within 7 treatment day(s). (2.)Mr. Mati Chew will transfer from bed to chair and chair to bed with SUPERVISION using the least restrictive device within 7 treatment day(s). (3.)Mr. Mati Chew will ambulate with SUPERVISION for 100+ feet with the least restrictive device within 7 treatment day(s). (4.)Mr. Mati Chew will perform standing static and dynamic balance activities x 15 minutes with SUPERVISION to improve safety within 7 treatment day(s). (5.)Mr. Luda Ashley will ambulate and/or perform functional activities for 15 consecutive minutes with stable vital signs and no rests required to improve activity tolerance within 7 treatment days. ________________________________________________________________________________________________    PHYSICAL THERAPY: Daily Note and PM Treatment Day # 2    Nava De Anda is a 67 y.o. male   PRIMARY DIAGNOSIS: Acute respiratory failure with hypoxia ([address]  CAP (community acquired pneumonia) [J18.9]       ASSESSMENT:     REHAB RECOMMENDATIONS: CURRENT LEVEL OF FUNCTION:  (Most Recently Demonstrated)   Recommendation to date pending progress:  Setting:   Short-term Rehab   Equipment:    Rolling Walker Bed Mobility:   Not tested  Sit to Stand:  [employer] Assistance  Transfers:   Not tested  Gait/Mobility:   Not tested     ASSESSMENT:  Mr. Luda Ashley seen this PM for therapeutic exercise, transfer training, & balance training. Pt. Requiring less assistance for sit to/from stand transfers, performing consistently w/ CGA this session. Pt. Also able to begin standing exercises w/ improved standing balance w/ B UE support & was on decreased FiO2 as compared to prior session. However, pt. Does cont. To require multiple rest breaks due to desaturation, dropping to upper 70s w/ activity & low 80s w/ talking. Pt. Cont. To mobilize below his baseline & requires ongoing PT to address. SUBJECTIVE:   Mr. Luda Ashley states, \"I feel like I've made a lot of progress this week. \"    SOCIAL HISTORY/ LIVING ENVIRONMENT: Patient lives in a 2 story home (basement) on the main level with his spouse. His son lives \"200 feet from me. \" He typically is independent with ambulation/ADLs and able to perform yard work at baseline, however does admit to balance challenges even at baseline and experience a fall prior to admission.    Home Environment: Private residence  # Steps to Enter: 1  One/Two Story Residence: Two story, live on 1st floor  Living Alone: No  Support Systems: Spouse/Significant Other/Partner, Child(dileep)  OBJECTIVE:     PAIN: VITAL SIGNS: LINES/DRAINS:   Pre Treatment: Pain Screen  Pain Scale 1: Numeric (0 - 10)  Pain Intensity 1: 0no c/o pain  Post Treatment: 0/10 Airvo 50L/60%  HR 78-96  O2 95%-78% during standing exercises; Recovers to 90% within 1 minute  O2 as low as 84% during conversational speech   O2 Device: Heated, Hi flow nasal cannula     MOBILITY: I Mod I S SBA CGA Min Mod Max Total  NT x2 Comments:   Bed Mobility    Rolling [] [] [] [] [] [] [] [] [] [x] [] Pt. Up in chair upon arrival   Supine to Sit [] [] [] [] [] [] [] [] [] [x] []    Scooting [] [] [] [] [] [] [] [] [] [x] []    Sit to Supine [] [] [] [] [] [] [] [] [] [x] []    Transfers    Sit to Stand [] [] [] [] [x] [] [] [] [] [] []    Bed to Chair [] [] [] [] [] [] [] [] [] [x] []    Stand to Sit [] [] [] [] [x] [] [] [] [] [] []    I=Independent, Mod I=Modified Independent, S=Supervision, SBA=Standby Assistance, CGA=Contact Guard Assistance,   Min=Minimal Assistance, Mod=Moderate Assistance, Max=Maximal Assistance, Total=Total Assistance, NT=Not Tested    GAIT: I Mod I S SBA CGA Min Mod Max Total  NT x2 Comments:   Level of Assistance [] [] [] [] [] [] [x] [] [] [x] []    Distance NT this session    DME Rolling Walker and Gait Belt    Gait Quality N/A    I=Independent, Mod I=Modified Independent, S=Supervision, SBA=Standby Assistance, CGA=Contact Guard Assistance,   Min=Minimal Assistance, Mod=Moderate Assistance, Max=Maximal Assistance, Total=Total Assistance, NT=Not Tested    PLAN:   FREQUENCY/DURATION: PT Plan of Care: 3 times/week for duration of hospital stay or until stated goals are met, whichever comes first.  TREATMENT:     TREATMENT:   ($$ Therapeutic Activity: 23-37 mins  $$ Therapeutic Exercises: 8-22 mins    )    Therapeutic Exercise: ( 15 minutes):  Exercises per grid below to improve mobility, strength and endurance. Required minimal visual and verbal cues to promote proper body mechanics. Progressed complexity of movement as indicated. Pt. Performed standing exercises w/ RW/B UE support & multiple seated ret breaks to increase standing balance & endurance:  STANDING EXERCISES Sets Reps Comments   Heel Raises 1 5    Hip Flexion/Marching 1 5    Forward toe taps 1 5    Hip Abduction 1 5    Mini Squats 1 5      Therapeutic Activity: (  25 minutes): Therapeutic activities including Bed transfers, sitting EOB and standing to take a few steps along EOB to improve mobility, strength and balance. Required minimal   assist to promote static and dynamic balance in standing. Performed repeated sit to/from stand from recliner during standing exercise sequence. Pt. Required min A for first standing trial due to increased forward lean, but able to correct on subsequent standing activities w/ cues required for posture.       AFTER TREATMENT POSITION/PRECAUTIONS:  Chair and Needs within reach    INTERDISCIPLINARY COLLABORATION:  RN/PCT and PT/PTA    TOTAL TREATMENT DURATION:  PT Patient Time In/Time Out  Time In: 1335  Time Out: 1111 [facility],

## 2021-01-23 LAB
ALBUMIN SERPL-MCNC: 2.2 G/DL (ref 3.2–4.6)
ALBUMIN/GLOB SERPL: 0.5 {RATIO} (ref 1.2–3.5)
ALP SERPL-CCNC: 86 U/L (ref 50–136)
ALT SERPL-CCNC: 108 U/L (ref 12–65)
ANION GAP SERPL CALC-SCNC: 2 MMOL/L (ref 7–16)
AST SERPL-CCNC: 37 U/L (ref 15–37)
BASOPHILS # BLD: 0 K/UL (ref 0–0.2)
BASOPHILS NFR BLD: 1 % (ref 0–2)
BILIRUB SERPL-MCNC: 0.5 MG/DL (ref 0.2–1.1)
BUN SERPL-MCNC: 11 MG/DL (ref 8–23)
CALCIUM SERPL-MCNC: 8.5 MG/DL (ref 8.3–10.4)
CHLORIDE SERPL-SCNC: 92 MMOL/L (ref 98–107)
CO2 SERPL-SCNC: 39 MMOL/L (ref 21–32)
CREAT SERPL-MCNC: 0.84 MG/DL (ref 0.8–1.5)
DIFFERENTIAL METHOD BLD: ABNORMAL
EOSINOPHIL # BLD: 0.1 K/UL (ref 0–0.8)
EOSINOPHIL NFR BLD: 2 % (ref 0.5–7.8)
ERYTHROCYTE [DISTWIDTH] IN BLOOD BY AUTOMATED COUNT: 14.5 % (ref 11.9–14.6)
GLOBULIN SER CALC-MCNC: 4.4 G/DL (ref 2.3–3.5)
GLUCOSE BLD STRIP.AUTO-MCNC: 217 MG/DL (ref 65–100)
GLUCOSE BLD STRIP.AUTO-MCNC: 281 MG/DL (ref 65–100)
GLUCOSE BLD STRIP.AUTO-MCNC: 289 MG/DL (ref 65–100)
GLUCOSE BLD STRIP.AUTO-MCNC: 367 MG/DL (ref 65–100)
GLUCOSE SERPL-MCNC: 196 MG/DL (ref 65–100)
HCT VFR BLD AUTO: 28 % (ref 41.1–50.3)
HGB BLD-MCNC: 9.3 G/DL (ref 13.6–17.2)
IMM GRANULOCYTES # BLD AUTO: 0.1 K/UL (ref 0–0.5)
IMM GRANULOCYTES NFR BLD AUTO: 1 % (ref 0–5)
LYMPHOCYTES # BLD: 1 K/UL (ref 0.5–4.6)
LYMPHOCYTES NFR BLD: 17 % (ref 13–44)
MCH RBC QN AUTO: 32 PG (ref 26.1–32.9)
MCHC RBC AUTO-ENTMCNC: 33.2 G/DL (ref 31.4–35)
MCV RBC AUTO: 96.2 FL (ref 79.6–97.8)
MONOCYTES # BLD: 0.7 K/UL (ref 0.1–1.3)
MONOCYTES NFR BLD: 11 % (ref 4–12)
NEUTS SEG # BLD: 4.1 K/UL (ref 1.7–8.2)
NEUTS SEG NFR BLD: 68 % (ref 43–78)
NRBC # BLD: 0.03 K/UL (ref 0–0.2)
PLATELET # BLD AUTO: 143 K/UL (ref 150–450)
PMV BLD AUTO: 10.7 FL (ref 9.4–12.3)
POTASSIUM SERPL-SCNC: 3.6 MMOL/L (ref 3.5–5.1)
PROT SERPL-MCNC: 6.6 G/DL (ref 6.3–8.2)
RBC # BLD AUTO: 2.91 M/UL (ref 4.23–5.6)
SODIUM SERPL-SCNC: 133 MMOL/L (ref 136–145)
WBC # BLD AUTO: 6.1 K/UL (ref 4.3–11.1)

## 2021-01-23 PROCEDURE — 74011636637 HC RX REV CODE- 636/637: Performed by: INTERNAL MEDICINE

## 2021-01-23 PROCEDURE — 74011250637 HC RX REV CODE- 250/637: Performed by: INTERNAL MEDICINE

## 2021-01-23 PROCEDURE — 94762 N-INVAS EAR/PLS OXIMTRY CONT: CPT

## 2021-01-23 PROCEDURE — 74011250636 HC RX REV CODE- 250/636: Performed by: INTERNAL MEDICINE

## 2021-01-23 PROCEDURE — 74011636637 HC RX REV CODE- 636/637: Performed by: NURSE PRACTITIONER

## 2021-01-23 PROCEDURE — 77010033711 HC HIGH FLOW OXYGEN

## 2021-01-23 PROCEDURE — 82962 GLUCOSE BLOOD TEST: CPT

## 2021-01-23 PROCEDURE — 94640 AIRWAY INHALATION TREATMENT: CPT

## 2021-01-23 PROCEDURE — 36415 COLL VENOUS BLD VENIPUNCTURE: CPT

## 2021-01-23 PROCEDURE — 77030021668 HC NEB PREFIL KT VYRM -A

## 2021-01-23 PROCEDURE — 65660000000 HC RM CCU STEPDOWN

## 2021-01-23 PROCEDURE — 85025 COMPLETE CBC W/AUTO DIFF WBC: CPT

## 2021-01-23 PROCEDURE — 80053 COMPREHEN METABOLIC PANEL: CPT

## 2021-01-23 RX ADMIN — ROSUVASTATIN 10 MG: 10 TABLET, FILM COATED ORAL at 21:05

## 2021-01-23 RX ADMIN — INSULIN LISPRO 4 UNITS: 100 INJECTION, SOLUTION INTRAVENOUS; SUBCUTANEOUS at 05:58

## 2021-01-23 RX ADMIN — ENOXAPARIN SODIUM 70 MG: 80 INJECTION SUBCUTANEOUS at 21:04

## 2021-01-23 RX ADMIN — ALBUTEROL SULFATE 2 PUFF: 108 INHALANT RESPIRATORY (INHALATION) at 20:30

## 2021-01-23 RX ADMIN — BUDESONIDE AND FORMOTEROL FUMARATE DIHYDRATE 2 PUFF: 160; 4.5 AEROSOL RESPIRATORY (INHALATION) at 20:30

## 2021-01-23 RX ADMIN — GUAIFENESIN 1200 MG: 600 TABLET ORAL at 09:02

## 2021-01-23 RX ADMIN — MONTELUKAST SODIUM 10 MG: 10 TABLET, FILM COATED ORAL at 21:04

## 2021-01-23 RX ADMIN — FUROSEMIDE 20 MG: 10 INJECTION INTRAMUSCULAR; INTRAVENOUS at 18:21

## 2021-01-23 RX ADMIN — INSULIN LISPRO 6 UNITS: 100 INJECTION, SOLUTION INTRAVENOUS; SUBCUTANEOUS at 17:06

## 2021-01-23 RX ADMIN — ENOXAPARIN SODIUM 70 MG: 80 INJECTION SUBCUTANEOUS at 12:04

## 2021-01-23 RX ADMIN — ALBUTEROL SULFATE 2 PUFF: 108 INHALANT RESPIRATORY (INHALATION) at 15:35

## 2021-01-23 RX ADMIN — INSULIN LISPRO 10 UNITS: 100 INJECTION, SOLUTION INTRAVENOUS; SUBCUTANEOUS at 12:04

## 2021-01-23 RX ADMIN — BUDESONIDE AND FORMOTEROL FUMARATE DIHYDRATE 2 PUFF: 160; 4.5 AEROSOL RESPIRATORY (INHALATION) at 09:00

## 2021-01-23 RX ADMIN — ALBUTEROL SULFATE 2 PUFF: 108 INHALANT RESPIRATORY (INHALATION) at 09:00

## 2021-01-23 RX ADMIN — FUROSEMIDE 20 MG: 10 INJECTION INTRAMUSCULAR; INTRAVENOUS at 08:55

## 2021-01-23 RX ADMIN — INSULIN LISPRO 6 UNITS: 100 INJECTION, SOLUTION INTRAVENOUS; SUBCUTANEOUS at 21:20

## 2021-01-23 RX ADMIN — GUAIFENESIN 1200 MG: 600 TABLET ORAL at 21:05

## 2021-01-23 RX ADMIN — Medication 10 ML: at 05:58

## 2021-01-23 RX ADMIN — LORATADINE 10 MG: 10 TABLET ORAL at 09:03

## 2021-01-23 RX ADMIN — Medication 10 ML: at 21:05

## 2021-01-23 RX ADMIN — ASPIRIN 81 MG: 81 TABLET, COATED ORAL at 21:04

## 2021-01-23 RX ADMIN — MAGNESIUM GLUCONATE 500 MG ORAL TABLET 400 MG: 500 TABLET ORAL at 09:02

## 2021-01-23 RX ADMIN — CLOPIDOGREL BISULFATE 75 MG: 75 TABLET ORAL at 09:06

## 2021-01-23 RX ADMIN — Medication 10 ML: at 17:06

## 2021-01-23 RX ADMIN — INSULIN GLARGINE 10 UNITS: 100 INJECTION, SOLUTION SUBCUTANEOUS at 08:54

## 2021-01-23 NOTE — PROGRESS NOTES
Received bedside shift report from [facility]. Pt lying in bed. No apparent distress. Respirations even and unlabored. Instructed to call for assistance with needs, as they arise. Pt voiced understanding.

## 2021-01-23 NOTE — PROGRESS NOTES
Progress Note    Patient: Tee Pickens MRN: [id number]  SSN: [id number]    YOB: 1945  Age: 67 y.o. Sex: male      Admit Date: 12/31/2020    LOS: 23 days     Subjective:   68 yo CM with past history of CAD s/p stents, ischemic cardiomyopathy, PAD (on Plavix and cilostazo), COPD, DM type II, HTN, HLD presents via EMS after fall at home and was admitted due to acute respiratory failure due to COVID and had an episode of VTach.  Family has refused for remdesivir and convalescent plasma.  Patient oxygen requirement has been worsening.  Initially he was on 40 to 60 L airvo BiPAP at night. Patient seen and examined at bedside. This morning still having some SOB, no CP, no abdominal pain, no nausea or vomiting.      Objective:     Vitals:    01/23/21 0401 01/23/21 0743 01/23/21 0900 01/23/21 1115   BP: 112/67 133/68  115/65   Pulse: 89 75  84   Resp: 19 20  22   Temp: 98 °F (36.7 °C) 98.6 °F (37 °C)  98.6 °F (37 °C)   SpO2: 96% 95% 90% 95%   Weight:       Height:            Intake and Output:  Current Shift: 01/23 0701 - 01/23 1900  In: 360 [P.O.:360]  Out: 650 [Urine:650]  Last three shifts: 01/21 1901 - 01/23 0700  In: 600 [P.O.:600]  Out: 2000 [Urine:2000]    ROS  10 ROS negative except from stated on subjective    Physical Exam:   General: Alert, oriented, NAD  HEENT: NC/AT, EOM are intact  Neck: supple, no JVD  Cardiovascular: RRR, S1, S2, no murmurs  Respiratory: decrease breath sounds  Abdomen: Soft, NT, ND  Back: No CVA tenderness, no paraspinal tenderness  Extremities: LE without pedal edema, no erythema  Neuro: A&O, CN are intact, no focal deficits  Skin: no rash or ulcers  Psych: good mood and affect    Lab/Data Review:  I have personally reviewed patients laboratory data showing  Recent Results (from the past 24 hour(s))   GLUCOSE, POC    Collection Time: 01/22/21  9:09 PM   Result Value Ref Range    Glucose (POC) 288 (H) 65 - 100 mg/dL   GLUCOSE, POC    Collection Time: 01/23/21  5:34 AM Result Value Ref Range    Glucose (POC) 217 (H) 65 - 444 mg/dL   METABOLIC PANEL, COMPREHENSIVE    Collection Time: 01/23/21  7:17 AM   Result Value Ref Range    Sodium 133 (L) 136 - 145 mmol/L    Potassium 3.6 3.5 - 5.1 mmol/L    Chloride 92 (L) 98 - 107 mmol/L    CO2 39 (H) 21 - 32 mmol/L    Anion gap 2 (L) 7 - 16 mmol/L    Glucose 196 (H) 65 - 100 mg/dL    BUN 11 8 - 23 MG/DL    Creatinine 0.84 0.8 - 1.5 MG/DL    GFR est AA >60 >60 ml/min/1.73m2    GFR est non-AA >60 >60 ml/min/1.73m2    Calcium 8.5 8.3 - 10.4 MG/DL    Bilirubin, total 0.5 0.2 - 1.1 MG/DL    ALT (SGPT) 108 (H) 12 - 65 U/L    AST (SGOT) 37 15 - 37 U/L    Alk. phosphatase 86 50 - 136 U/L    Protein, total 6.6 6.3 - 8.2 g/dL    Albumin 2.2 (L) 3.2 - 4.6 g/dL    Globulin 4.4 (H) 2.3 - 3.5 g/dL    A-G Ratio 0.5 (L) 1.2 - 3.5     CBC WITH AUTOMATED DIFF    Collection Time: 01/23/21  7:17 AM   Result Value Ref Range    WBC 6.1 4.3 - 11.1 K/uL    RBC 2.91 (L) 4.23 - 5.6 M/uL    HGB 9.3 (L) 13.6 - 17.2 g/dL    HCT 28.0 (L) 41.1 - 50.3 %    MCV 96.2 79.6 - 97.8 FL    MCH 32.0 26.1 - 32.9 PG    MCHC 33.2 31.4 - 35.0 g/dL    RDW 14.5 11.9 - 14.6 %    PLATELET 767 (L) 551 - 450 K/uL    MPV 10.7 9.4 - 12.3 FL    ABSOLUTE NRBC 0.03 0.0 - 0.2 K/uL    DF AUTOMATED      NEUTROPHILS 68 43 - 78 %    LYMPHOCYTES 17 13 - 44 %    MONOCYTES 11 4.0 - 12.0 %    EOSINOPHILS 2 0.5 - 7.8 %    BASOPHILS 1 0.0 - 2.0 %    IMMATURE GRANULOCYTES 1 0.0 - 5.0 %    ABS. NEUTROPHILS 4.1 1.7 - 8.2 K/UL    ABS. LYMPHOCYTES 1.0 0.5 - 4.6 K/UL    ABS. MONOCYTES 0.7 0.1 - 1.3 K/UL    ABS. EOSINOPHILS 0.1 0.0 - 0.8 K/UL    ABS. BASOPHILS 0.0 0.0 - 0.2 K/UL    ABS. IMM.  GRANS. 0.1 0.0 - 0.5 K/UL   GLUCOSE, POC    Collection Time: 01/23/21 11:35 AM   Result Value Ref Range    Glucose (POC) 367 (H) 65 - 100 mg/dL   GLUCOSE, POC    Collection Time: 01/23/21  3:25 PM   Result Value Ref Range    Glucose (POC) 281 (H) 65 - 100 mg/dL        Image:  I have personally reviewed patients imaging showing  XR CHEST SNGL V   Final Result   Bilateral lung infiltrates, mildly progressed on the right and   mildly improved on the left. XR CHEST SNGL V   Final Result      XR CHEST SNGL V   Final Result   IMPRESSION: Unchanged bilateral lung infiltrates. US ABD LTD   Final Result   Impression:      1. Hepatomegaly         CPT code(s) 82814                  XR CHEST SNGL V   Final Result      DUPLEX LOWER EXT VENOUS BILAT   Final Result   IMPRESSION:   1. No evidence of deep venous thrombosis in either lower extremity. XR CHEST SNGL V   Final Result   IMPRESSION:    1.  Stable cardiomegaly and multifocal bilateral lung infiltrates. .         This report was made using voice transcription. Despite my best efforts to avoid   any, transcription errors may persist. If there is any question about the   accuracy of the report or need for clarification, then please call [phone], or text me through [email or web address] for clarification or correction. US RETROPERITONEUM LTD   Final Result   IMPRESSION:    1. No hydronephrosis or suspicious renal findings. 2. Incidental splenomegaly. XR CHEST SNGL V   Final Result   IMPRESSION: Marked worsening of the patient's bilateral atypical viral pneumonia   pattern. XR CHEST PORT   Final Result   IMPRESSION:    1. Mild right lung base infiltrate, suspect for acute pneumonia. 2. COPD.       XR CHEST SNGL V    (Results Pending)   XR CHEST SNGL V    (Results Pending)        Hospital problems     Principal Problem:    Acute respiratory failure with hypoxia ([address] (10/23/2014)    Active Problems:    Coronary artery disease involving native coronary artery of native heart without angina pectoris (10/12/2011)      Morbid obesity ([address] (10/12/2011)      DM (diabetes mellitus) type II, controlled, with peripheral vascular disorder ([address] (9/11/2014)      AGUSTIN (acute kidney injury) ([address] (9/15/2014)      COPD (chronic obstructive pulmonary disease) ([address] (4/10/2015)      PAD (peripheral artery disease) ([facility] (11/10/2015)      Hyperlipidemia (11/10/2015)      CAP (community acquired pneumonia) (12/31/2020)      Monomorphic ventricular tachycardia ([facility] (12/31/2020)      COVID-19 (12/31/2020)      Elevated troponin (12/31/2020)        Assessment and Plan:   68 yo CM with past history of CAD s/p stents, ischemic cardiomyopathy, PAD (on Plavix and cilostazo), COPD, DM type II, HTN, HLD presents via EMS after fall at home and was admitted due to acute respiratory failure due to COVID     1. Acute respiratory failure with hypoxia secondary to COVID-19 pna  -on airvo, CPAP at night  - Completed Decadron  - Refused Remdesivir and convalescent plasma   - Continue home aerosols  - Completed antibiotics for possible CAP  - Wean oxygen as appropriate  - Continue Lasix     2. Monomorphic ventricular tachycardia  - Patient found to have monomorphic VT with EMS, converted spontaneously   - Given Mag in ER  - Cardiology saw him in ER and recommended correcting electrolytes  - Telemetry    3. Positive blood cultures likely contamination   - Initial blood cultures 12/23 with alpha strep + staph coagulase negative  - Repeated cultures NG     4. AGUSTIN  - Resolved     5. Elevated troponin likely demand ischemia   - No acute CP  - Continue ASA + Plavix  - Cardiology recs     6. DM  - A1C on 12/16/20 was 6.8  - Blood sugars in 300s, patient is on steroids   - Basal insulin   - Continue Humalog SSI  - BS ACHS     7. Coronary artery disease  - Continue ASA + Plavix  - Continue Crestor     8. COPD, not on exacerbation  - Continue home aerosols     9. Hyperlipidemia  - Continue Crestor     10. Transaminitis  - Abdominal U/S hepatomegaly  - Monitor LFTs      DVT ppx    I have reviewed, updated, and verified this note's content and spent 38 minutes of my 42 minutes visit performing counseling and coordination of care regarding medical management.       Signed By: Jose Ramon Wheeler MD     January 23, 2021

## 2021-01-23 NOTE — PROGRESS NOTES
Titrated O2 down from 65 to 60%     01/23/21 1613   Vitals   Temp 98.6 °F (37 °C)   Temp Source Oral   Pulse (Heart Rate) 72   Resp Rate 22   O2 Sat (%) 95 %   Level of Consciousness Alert   /68   MAP (Calculated) 80   BP 1 Location Left arm   BP 1 Method Automatic   BP Patient Position At rest   MEWS Score 2   Oxygen Therapy   Pulse via Oximetry 95 beats per minute   O2 Device Heated; Hi flow nasal cannula   O2 Flow Rate (L/min) 50 l/min   O2 Temperature 87.8 °F (31 °C)   FIO2 (%) 60 %   Intake (mL)   P.O. 360 mL   % Diet Eaten 100 %   Output (mL)   Urine Voided 550 ml   Unmeasurable Output   Urine Occurrence(s) 1

## 2021-01-23 NOTE — PROGRESS NOTES
Progress Note    Patient: Roman Hoyt MRN: [id number]  SSN: [id number]    YOB: 1945  Age: 67 y.o. Sex: male      Admit Date: 12/31/2020    LOS: 23 days     Subjective:   66 yo CM with past history of CAD s/p stents, ischemic cardiomyopathy, PAD (on Plavix and cilostazo), COPD, DM type II, HTN, HLD presents via EMS after fall at home and was admitted due to acute respiratory failure due to COVID and had an episode of VTach.  Family has refused for remdesivir and convalescent plasma.  Patient oxygen requirement has been worsening.  Initially he was on 40 to 60 L airvo BiPAP at night. Patient seen and examined at bedside. Some SOB, no CP, no abdominal pain. Objective:     Vitals:    01/22/21 1929 01/22/21 2002 01/22/21 2305 01/23/21 0401   BP: 121/77  124/73 112/67   Pulse: 72  65 89   Resp: 17  18 19   Temp: 98.1 °F (36.7 °C)  98.3 °F (36.8 °C) 98 °F (36.7 °C)   SpO2: 95% 94% 93% 96%   Weight:       Height:            Intake and Output:  Current Shift: No intake/output data recorded.   Last three shifts: 01/21 1901 - 01/23 0700  In: 600 [P.O.:600]  Out: 2000 [Urine:2000]    ROS  10 ROS negative except from stated on subjective    Physical Exam:   General: Alert, oriented, NAD  HEENT: NC/AT, EOM are intact  Neck: supple, no JVD  Cardiovascular: RRR, S1, S2, no murmurs  Respiratory: decrease breath sounds  Abdomen: Soft, NT, ND  Back: No CVA tenderness, no paraspinal tenderness  Extremities: LE without pedal edema, no erythema  Neuro: A&O, CN are intact, no focal deficits  Skin: no rash or ulcers  Psych: good mood and affect    Lab/Data Review:  I have personally reviewed patients laboratory data showing  Recent Results (from the past 24 hour(s))   GLUCOSE, POC    Collection Time: 01/22/21 10:59 AM   Result Value Ref Range    Glucose (POC) 269 (H) 65 - 100 mg/dL   GLUCOSE, POC    Collection Time: 01/22/21  4:05 PM   Result Value Ref Range    Glucose (POC) 285 (H) 65 - 100 mg/dL   GLUCOSE, POC Collection Time: 01/22/21  9:09 PM   Result Value Ref Range    Glucose (POC) 288 (H) 65 - 100 mg/dL   GLUCOSE, POC    Collection Time: 01/23/21  5:34 AM   Result Value Ref Range    Glucose (POC) 217 (H) 65 - 100 mg/dL        Image:  I have personally reviewed patients imaging showing  XR CHEST SNGL V   Final Result   Bilateral lung infiltrates, mildly progressed on the right and   mildly improved on the left. XR CHEST SNGL V   Final Result      XR CHEST SNGL V   Final Result   IMPRESSION: Unchanged bilateral lung infiltrates. US ABD LTD   Final Result   Impression:      1. Hepatomegaly         CPT code(s) 89306                  XR CHEST SNGL V   Final Result      DUPLEX LOWER EXT VENOUS BILAT   Final Result   IMPRESSION:   1. No evidence of deep venous thrombosis in either lower extremity. XR CHEST SNGL V   Final Result   IMPRESSION:    1.  Stable cardiomegaly and multifocal bilateral lung infiltrates. .         This report was made using voice transcription. Despite my best efforts to avoid   any, transcription errors may persist. If there is any question about the   accuracy of the report or need for clarification, then please call [phone], or text me through [employer] for clarification or correction. US RETROPERITONEUM LTD   Final Result   IMPRESSION:    1. No hydronephrosis or suspicious renal findings. 2. Incidental splenomegaly. XR CHEST SNGL V   Final Result   IMPRESSION: Marked worsening of the patient's bilateral atypical viral pneumonia   pattern. XR CHEST PORT   Final Result   IMPRESSION:    1. Mild right lung base infiltrate, suspect for acute pneumonia. 2. COPD.       XR CHEST SNGL V    (Results Pending)   XR CHEST SNGL V    (Results Pending)        Hospital problems     Principal Problem:    Acute respiratory failure with hypoxia ([address] (10/23/2014)    Active Problems:    Coronary artery disease involving native coronary artery of native heart without angina pectoris (10/12/2011)      Morbid obesity ([facility] (10/12/2011)      DM (diabetes mellitus) type II, controlled, with peripheral vascular disorder ([facility] (9/11/2014)      AGUSTIN (acute kidney injury) ([facility] (9/15/2014)      COPD (chronic obstructive pulmonary disease) ([facility] (4/10/2015)      PAD (peripheral artery disease) ([facility] (11/10/2015)      Hyperlipidemia (11/10/2015)      CAP (community acquired pneumonia) (12/31/2020)      Monomorphic ventricular tachycardia ([facility] (12/31/2020)      COVID-19 (12/31/2020)      Elevated troponin (12/31/2020)        Assessment and Plan:   Acute respiratory failure with hypoxia secondary to COVID-19 infection (Dx 12/31):  -pulmonology on standby 1/19  -on airvo, CPAP at night  - Completed Decadron  - Refused Remdesivir and convalescent plasma   - Continue home aerosols  - Completed antibiotics for possible CAP  - Wean oxygen as appropriate  - Continue Lasix       Monomorphic ventricular tachycardia:  - Patient found to have monomorphic VT with EMS, converted on his own  - Given Mag in ER  - Cardiology saw him in ER and recommended correcting electrolytes  - Start Amio if it happens again  - Cardiology signed off, didn't write a note?       Concerns of bacteremia due to gram positive bacteria:  - Initial blood cultures 12/23 with strep + staph but both common contaminants  - Repeat cultures NGTD        AGUSTIN:  - Resolved       Elevated troponin likely demand ischemia   - No acute CP  - Continue ASA + Plavix  - Cardiology recs       DM (diabetes mellitus) type II:  - A1C on 12/16/20 was 6.8  - Blood sugars in 300s, patient is on steroids   - Increase Lantus to 35 units   - continue Humalog SSI       Coronary artery disease:  - Stable  - Continue ASA + Plavix  - Continue Crestor       COPD:  - No acute wheezing  - Continue home aerosols       PAD:  - Continue ASA + Plavix       Hyperlipidemia:   - Continue Crestor        Elevated LFTs:  -abdominal U/S hepatomegaly  -trend     DVT ppx    I have reviewed, updated, and verified this note's content and spent 38 minutes of my 42 minutes visit performing counseling and coordination of care regarding medical management.       Signed By: Asael Pham MD     January 23, 2021

## 2021-01-24 ENCOUNTER — APPOINTMENT (OUTPATIENT)
Dept: GENERAL RADIOLOGY | Age: 76
DRG: 177 | End: 2021-01-24
Attending: INTERNAL MEDICINE
Payer: MEDICARE

## 2021-01-24 LAB
ALBUMIN SERPL-MCNC: 2.3 G/DL (ref 3.2–4.6)
ALBUMIN/GLOB SERPL: 0.5 {RATIO} (ref 1.2–3.5)
ALP SERPL-CCNC: 85 U/L (ref 50–136)
ALT SERPL-CCNC: 95 U/L (ref 12–65)
ANION GAP SERPL CALC-SCNC: 4 MMOL/L (ref 7–16)
AST SERPL-CCNC: 38 U/L (ref 15–37)
BASOPHILS # BLD: 0.1 K/UL (ref 0–0.2)
BASOPHILS NFR BLD: 1 % (ref 0–2)
BILIRUB SERPL-MCNC: 0.4 MG/DL (ref 0.2–1.1)
BUN SERPL-MCNC: 12 MG/DL (ref 8–23)
CALCIUM SERPL-MCNC: 8.6 MG/DL (ref 8.3–10.4)
CHLORIDE SERPL-SCNC: 92 MMOL/L (ref 98–107)
CO2 SERPL-SCNC: 38 MMOL/L (ref 21–32)
CREAT SERPL-MCNC: 0.9 MG/DL (ref 0.8–1.5)
DIFFERENTIAL METHOD BLD: ABNORMAL
EOSINOPHIL # BLD: 0.2 K/UL (ref 0–0.8)
EOSINOPHIL NFR BLD: 4 % (ref 0.5–7.8)
ERYTHROCYTE [DISTWIDTH] IN BLOOD BY AUTOMATED COUNT: 14.6 % (ref 11.9–14.6)
GLOBULIN SER CALC-MCNC: 4.5 G/DL (ref 2.3–3.5)
GLUCOSE BLD STRIP.AUTO-MCNC: 217 MG/DL (ref 65–100)
GLUCOSE BLD STRIP.AUTO-MCNC: 282 MG/DL (ref 65–100)
GLUCOSE BLD STRIP.AUTO-MCNC: 305 MG/DL (ref 65–100)
GLUCOSE BLD STRIP.AUTO-MCNC: 312 MG/DL (ref 65–100)
GLUCOSE SERPL-MCNC: 205 MG/DL (ref 65–100)
HCT VFR BLD AUTO: 27.8 % (ref 41.1–50.3)
HGB BLD-MCNC: 9.2 G/DL (ref 13.6–17.2)
IMM GRANULOCYTES # BLD AUTO: 0.1 K/UL (ref 0–0.5)
IMM GRANULOCYTES NFR BLD AUTO: 2 % (ref 0–5)
LYMPHOCYTES # BLD: 1.1 K/UL (ref 0.5–4.6)
LYMPHOCYTES NFR BLD: 19 % (ref 13–44)
MCH RBC QN AUTO: 31.5 PG (ref 26.1–32.9)
MCHC RBC AUTO-ENTMCNC: 33.1 G/DL (ref 31.4–35)
MCV RBC AUTO: 95.2 FL (ref 79.6–97.8)
MONOCYTES # BLD: 0.6 K/UL (ref 0.1–1.3)
MONOCYTES NFR BLD: 10 % (ref 4–12)
NEUTS SEG # BLD: 3.5 K/UL (ref 1.7–8.2)
NEUTS SEG NFR BLD: 64 % (ref 43–78)
NRBC # BLD: 0 K/UL (ref 0–0.2)
PLATELET # BLD AUTO: 170 K/UL (ref 150–450)
PMV BLD AUTO: 10.7 FL (ref 9.4–12.3)
POTASSIUM SERPL-SCNC: 3.5 MMOL/L (ref 3.5–5.1)
PROT SERPL-MCNC: 6.8 G/DL (ref 6.3–8.2)
RBC # BLD AUTO: 2.92 M/UL (ref 4.23–5.6)
SODIUM SERPL-SCNC: 134 MMOL/L (ref 136–145)
WBC # BLD AUTO: 5.5 K/UL (ref 4.3–11.1)

## 2021-01-24 PROCEDURE — 74011250636 HC RX REV CODE- 250/636: Performed by: INTERNAL MEDICINE

## 2021-01-24 PROCEDURE — 36415 COLL VENOUS BLD VENIPUNCTURE: CPT

## 2021-01-24 PROCEDURE — 74011636637 HC RX REV CODE- 636/637: Performed by: INTERNAL MEDICINE

## 2021-01-24 PROCEDURE — 77010033711 HC HIGH FLOW OXYGEN

## 2021-01-24 PROCEDURE — 74011250637 HC RX REV CODE- 250/637: Performed by: INTERNAL MEDICINE

## 2021-01-24 PROCEDURE — 85025 COMPLETE CBC W/AUTO DIFF WBC: CPT

## 2021-01-24 PROCEDURE — 65660000000 HC RM CCU STEPDOWN

## 2021-01-24 PROCEDURE — 94762 N-INVAS EAR/PLS OXIMTRY CONT: CPT

## 2021-01-24 PROCEDURE — 94640 AIRWAY INHALATION TREATMENT: CPT

## 2021-01-24 PROCEDURE — 80053 COMPREHEN METABOLIC PANEL: CPT

## 2021-01-24 PROCEDURE — 74011636637 HC RX REV CODE- 636/637: Performed by: NURSE PRACTITIONER

## 2021-01-24 PROCEDURE — 82962 GLUCOSE BLOOD TEST: CPT

## 2021-01-24 PROCEDURE — 71045 X-RAY EXAM CHEST 1 VIEW: CPT

## 2021-01-24 RX ADMIN — GUAIFENESIN 1200 MG: 600 TABLET ORAL at 10:08

## 2021-01-24 RX ADMIN — BUDESONIDE AND FORMOTEROL FUMARATE DIHYDRATE 2 PUFF: 160; 4.5 AEROSOL RESPIRATORY (INHALATION) at 19:57

## 2021-01-24 RX ADMIN — ALBUTEROL SULFATE 2 PUFF: 108 INHALANT RESPIRATORY (INHALATION) at 19:57

## 2021-01-24 RX ADMIN — INSULIN LISPRO 8 UNITS: 100 INJECTION, SOLUTION INTRAVENOUS; SUBCUTANEOUS at 17:55

## 2021-01-24 RX ADMIN — CLOPIDOGREL BISULFATE 75 MG: 75 TABLET ORAL at 10:07

## 2021-01-24 RX ADMIN — MAGNESIUM GLUCONATE 500 MG ORAL TABLET 400 MG: 500 TABLET ORAL at 10:07

## 2021-01-24 RX ADMIN — BUDESONIDE AND FORMOTEROL FUMARATE DIHYDRATE 2 PUFF: 160; 4.5 AEROSOL RESPIRATORY (INHALATION) at 09:41

## 2021-01-24 RX ADMIN — INSULIN GLARGINE 10 UNITS: 100 INJECTION, SOLUTION SUBCUTANEOUS at 10:19

## 2021-01-24 RX ADMIN — ENOXAPARIN SODIUM 70 MG: 80 INJECTION SUBCUTANEOUS at 10:08

## 2021-01-24 RX ADMIN — FUROSEMIDE 20 MG: 10 INJECTION INTRAMUSCULAR; INTRAVENOUS at 17:56

## 2021-01-24 RX ADMIN — INSULIN LISPRO 8 UNITS: 100 INJECTION, SOLUTION INTRAVENOUS; SUBCUTANEOUS at 22:01

## 2021-01-24 RX ADMIN — GUAIFENESIN 1200 MG: 600 TABLET ORAL at 22:01

## 2021-01-24 RX ADMIN — Medication 10 ML: at 16:10

## 2021-01-24 RX ADMIN — FUROSEMIDE 20 MG: 10 INJECTION INTRAMUSCULAR; INTRAVENOUS at 10:08

## 2021-01-24 RX ADMIN — ASPIRIN 81 MG: 81 TABLET, COATED ORAL at 22:01

## 2021-01-24 RX ADMIN — ALBUTEROL SULFATE 2 PUFF: 108 INHALANT RESPIRATORY (INHALATION) at 09:41

## 2021-01-24 RX ADMIN — LORATADINE 10 MG: 10 TABLET ORAL at 10:07

## 2021-01-24 RX ADMIN — INSULIN LISPRO 6 UNITS: 100 INJECTION, SOLUTION INTRAVENOUS; SUBCUTANEOUS at 12:33

## 2021-01-24 RX ADMIN — Medication 5 ML: at 22:03

## 2021-01-24 RX ADMIN — ENOXAPARIN SODIUM 70 MG: 80 INJECTION SUBCUTANEOUS at 22:02

## 2021-01-24 RX ADMIN — ALBUTEROL SULFATE 2 PUFF: 108 INHALANT RESPIRATORY (INHALATION) at 14:35

## 2021-01-24 RX ADMIN — ROSUVASTATIN 10 MG: 10 TABLET, FILM COATED ORAL at 22:01

## 2021-01-24 RX ADMIN — INSULIN LISPRO 4 UNITS: 100 INJECTION, SOLUTION INTRAVENOUS; SUBCUTANEOUS at 06:09

## 2021-01-24 RX ADMIN — MONTELUKAST SODIUM 10 MG: 10 TABLET, FILM COATED ORAL at 22:01

## 2021-01-24 RX ADMIN — Medication 5 ML: at 06:09

## 2021-01-24 NOTE — PROGRESS NOTES
Received bedside shift report from Deandra Echols RN. Pt up in recliner. No apparent distress. Respirations even and unlabored. Instructed to call for assistance with needs, as they arise. Pt voiced understanding.

## 2021-01-24 NOTE — PROGRESS NOTES
Progress Note    Patient: Kemal Enriquez MRN: [id number]  SSN: [id number]    YOB: 1945  Age: 67 y.o. Sex: male      Admit Date: 12/31/2020    LOS: 24 days     Subjective:   68 yo CM with past history of CAD s/p stents, ischemic cardiomyopathy, PAD (on Plavix and cilostazo), COPD, DM type II, HTN, HLD presents via EMS after fall at home and was admitted due to acute respiratory failure due to COVID and had an episode of VTach.  Family has refused for remdesivir and convalescent plasma.  Patient oxygen requirement has been worsening.  Initially he was on 40 to 60 L airvo BiPAP at night. Patient seen and examined at bedside. This morning still having some SOB, no CP, no abdominal pain, no nausea or vomiting.      Objective:     Vitals:    01/24/21 0941 01/24/21 1147 01/24/21 1429 01/24/21 1436   BP:  (!) 114/59     Pulse:  (!) 111     Resp:  20     Temp:  98.1 °F (36.7 °C)     SpO2: 90% 97% 98% 98%   Weight:       Height:            Intake and Output:  Current Shift: 01/24 0701 - 01/24 1900  In: -   Out: 300 [Urine:300]  Last three shifts: 01/22 1901 - 01/24 0700  In: 720 [P.O.:720]  Out: 1200 [Urine:1200]    ROS  10 ROS negative except from stated on subjective    Physical Exam:   General: Alert, oriented, NAD  HEENT: NC/AT, EOM are intact  Neck: supple, no JVD  Cardiovascular: RRR, S1, S2, no murmurs  Respiratory: decrease breath sounds  Abdomen: Soft, NT, ND  Back: No CVA tenderness, no paraspinal tenderness  Extremities: LE without pedal edema, no erythema  Neuro: A&O, CN are intact, no focal deficits  Skin: no rash or ulcers  Psych: good mood and affect    Lab/Data Review:  I have personally reviewed patients laboratory data showing  Recent Results (from the past 24 hour(s))   GLUCOSE, POC    Collection Time: 01/23/21  3:25 PM   Result Value Ref Range    Glucose (POC) 281 (H) 65 - 100 mg/dL   GLUCOSE, POC    Collection Time: 01/23/21  9:08 PM   Result Value Ref Range    Glucose (POC) 289 (H) 65 - 100 mg/dL   GLUCOSE, POC    Collection Time: 01/24/21  5:43 AM   Result Value Ref Range    Glucose (POC) 217 (H) 65 - 332 mg/dL   METABOLIC PANEL, COMPREHENSIVE    Collection Time: 01/24/21  7:43 AM   Result Value Ref Range    Sodium 134 (L) 136 - 145 mmol/L    Potassium 3.5 3.5 - 5.1 mmol/L    Chloride 92 (L) 98 - 107 mmol/L    CO2 38 (H) 21 - 32 mmol/L    Anion gap 4 (L) 7 - 16 mmol/L    Glucose 205 (H) 65 - 100 mg/dL    BUN 12 8 - 23 MG/DL    Creatinine 0.90 0.8 - 1.5 MG/DL    GFR est AA >60 >60 ml/min/1.73m2    GFR est non-AA >60 >60 ml/min/1.73m2    Calcium 8.6 8.3 - 10.4 MG/DL    Bilirubin, total 0.4 0.2 - 1.1 MG/DL    ALT (SGPT) 95 (H) 12 - 65 U/L    AST (SGOT) 38 (H) 15 - 37 U/L    Alk. phosphatase 85 50 - 136 U/L    Protein, total 6.8 6.3 - 8.2 g/dL    Albumin 2.3 (L) 3.2 - 4.6 g/dL    Globulin 4.5 (H) 2.3 - 3.5 g/dL    A-G Ratio 0.5 (L) 1.2 - 3.5     CBC WITH AUTOMATED DIFF    Collection Time: 01/24/21  7:43 AM   Result Value Ref Range    WBC 5.5 4.3 - 11.1 K/uL    RBC 2.92 (L) 4.23 - 5.6 M/uL    HGB 9.2 (L) 13.6 - 17.2 g/dL    HCT 27.8 (L) 41.1 - 50.3 %    MCV 95.2 79.6 - 97.8 FL    MCH 31.5 26.1 - 32.9 PG    MCHC 33.1 31.4 - 35.0 g/dL    RDW 14.6 11.9 - 14.6 %    PLATELET 335 171 - 870 K/uL    MPV 10.7 9.4 - 12.3 FL    ABSOLUTE NRBC 0.00 0.0 - 0.2 K/uL    DF AUTOMATED      NEUTROPHILS 64 43 - 78 %    LYMPHOCYTES 19 13 - 44 %    MONOCYTES 10 4.0 - 12.0 %    EOSINOPHILS 4 0.5 - 7.8 %    BASOPHILS 1 0.0 - 2.0 %    IMMATURE GRANULOCYTES 2 0.0 - 5.0 %    ABS. NEUTROPHILS 3.5 1.7 - 8.2 K/UL    ABS. LYMPHOCYTES 1.1 0.5 - 4.6 K/UL    ABS. MONOCYTES 0.6 0.1 - 1.3 K/UL    ABS. EOSINOPHILS 0.2 0.0 - 0.8 K/UL    ABS. BASOPHILS 0.1 0.0 - 0.2 K/UL    ABS. IMM. GRANS. 0.1 0.0 - 0.5 K/UL   GLUCOSE, POC    Collection Time: 01/24/21 11:13 AM   Result Value Ref Range    Glucose (POC) 282 (H) 65 - 100 mg/dL        Image:  I have personally reviewed patients imaging showing  XR CHEST SNGL V   Final Result      1.  Bilateral diffuse airspace disease, similar to prior exam.      XR CHEST SNGL V   Final Result   Bilateral lung infiltrates, mildly progressed on the right and   mildly improved on the left. XR CHEST SNGL V   Final Result      XR CHEST SNGL V   Final Result   IMPRESSION: Unchanged bilateral lung infiltrates. US ABD LTD   Final Result   Impression:      1. Hepatomegaly         CPT code(s) 49151                  XR CHEST SNGL V   Final Result      DUPLEX LOWER EXT VENOUS BILAT   Final Result   IMPRESSION:   1. No evidence of deep venous thrombosis in either lower extremity. XR CHEST SNGL V   Final Result   IMPRESSION:    1.  Stable cardiomegaly and multifocal bilateral lung infiltrates. .         This report was made using voice transcription. Despite my best efforts to avoid   any, transcription errors may persist. If there is any question about the   accuracy of the report or need for clarification, then please call [phone], or text me through [employer] for clarification or correction. [employer]   Final Result   IMPRESSION:    1. No hydronephrosis or suspicious renal findings. 2. Incidental splenomegaly. XR CHEST SNGL V   Final Result   IMPRESSION: Marked worsening of the patient's bilateral atypical viral pneumonia   pattern. XR CHEST PORT   Final Result   IMPRESSION:    1. Mild right lung base infiltrate, suspect for acute pneumonia. 2. COPD.       XR CHEST SNGL V    (Results Pending)        Hospital problems     Principal Problem:    Acute respiratory failure with hypoxia ([address] (10/23/2014)    Active Problems:    Coronary artery disease involving native coronary artery of native heart without angina pectoris (10/12/2011)      Morbid obesity ([address] (10/12/2011)      DM (diabetes mellitus) type II, controlled, with peripheral vascular disorder ([address] (9/11/2014)      AGUSTIN (acute kidney injury) ([address] (9/15/2014)      COPD [facility]chronic obstructive [facility] disease) [facility] (4/10/2015)      PAD (peripheral artery disease) ([facility] (11/10/2015)      Hyperlipidemia (11/10/2015)      CAP (community acquired pneumonia) (12/31/2020)      Monomorphic ventricular tachycardia ([facility] (12/31/2020)      COVID-19 (12/31/2020)      Elevated troponin (12/31/2020)        Assessment and Plan:   66 yo CM with past history of CAD s/p stents, ischemic cardiomyopathy, PAD (on Plavix and cilostazo), COPD, DM type II, HTN, HLD presents via EMS after fall at home and was admitted due to acute respiratory failure due to COVID     1. Acute respiratory failure with hypoxia secondary to COVID-19 pna  -on airvo, CPAP at night  - Completed Decadron  - Refused Remdesivir and convalescent plasma   - Continue home aerosols  - Completed antibiotics for possible CAP  - Wean oxygen as appropriate  - Continue Lasix     2. Monomorphic ventricular tachycardia  - Patient found to have monomorphic VT with EMS, converted spontaneously   - Given Mag in ER  - Cardiology saw him in ER and recommended correcting electrolytes  - Telemetry    3. Positive blood cultures likely contamination   - Initial blood cultures 12/23 with alpha strep + staph coagulase negative  - Repeated cultures NG     4. AGUSTIN  - Resolved     5. Elevated troponin likely demand ischemia   - No acute CP  - Continue ASA + Plavix  - Cardiology recs     6. DM  - A1C on 12/16/20 was 6.8  - Blood sugars in 300s, patient is on steroids   - Basal insulin   - Continue Humalog SSI  - BS ACHS     7. Coronary artery disease  - Continue ASA + Plavix  - Continue Crestor     8. COPD, not on exacerbation  - Continue home aerosols     9. Hyperlipidemia  - Continue Crestor     10. Transaminitis  - Abdominal U/S hepatomegaly  - Monitor LFTs      DVT ppx    I have reviewed, updated, and verified this note's content and spent 38 minutes of my 42 minutes visit performing counseling and coordination of care regarding medical management.       Signed By: Tigist Ge MD January 24, 2021

## 2021-01-25 LAB
ALBUMIN SERPL-MCNC: 2.4 G/DL (ref 3.2–4.6)
ALBUMIN/GLOB SERPL: 0.6 {RATIO} (ref 1.2–3.5)
ALP SERPL-CCNC: 81 U/L (ref 50–136)
ALT SERPL-CCNC: 80 U/L (ref 12–65)
ANION GAP SERPL CALC-SCNC: 3 MMOL/L (ref 7–16)
AST SERPL-CCNC: 33 U/L (ref 15–37)
BASOPHILS # BLD: 0 K/UL (ref 0–0.2)
BASOPHILS NFR BLD: 1 % (ref 0–2)
BILIRUB SERPL-MCNC: 0.5 MG/DL (ref 0.2–1.1)
BUN SERPL-MCNC: 11 MG/DL (ref 8–23)
CALCIUM SERPL-MCNC: 8.6 MG/DL (ref 8.3–10.4)
CHLORIDE SERPL-SCNC: 92 MMOL/L (ref 98–107)
CO2 SERPL-SCNC: 39 MMOL/L (ref 21–32)
CREAT SERPL-MCNC: 0.8 MG/DL (ref 0.8–1.5)
DIFFERENTIAL METHOD BLD: ABNORMAL
EOSINOPHIL # BLD: 0.2 K/UL (ref 0–0.8)
EOSINOPHIL NFR BLD: 3 % (ref 0.5–7.8)
ERYTHROCYTE [DISTWIDTH] IN BLOOD BY AUTOMATED COUNT: 14.8 % (ref 11.9–14.6)
GLOBULIN SER CALC-MCNC: 3.8 G/DL (ref 2.3–3.5)
GLUCOSE BLD STRIP.AUTO-MCNC: 198 MG/DL (ref 65–100)
GLUCOSE BLD STRIP.AUTO-MCNC: 268 MG/DL (ref 65–100)
GLUCOSE BLD STRIP.AUTO-MCNC: 335 MG/DL (ref 65–100)
GLUCOSE BLD STRIP.AUTO-MCNC: 345 MG/DL (ref 65–100)
GLUCOSE BLD STRIP.AUTO-MCNC: 402 MG/DL (ref 65–100)
GLUCOSE SERPL-MCNC: 179 MG/DL (ref 65–100)
HCT VFR BLD AUTO: 28.4 % (ref 41.1–50.3)
HGB BLD-MCNC: 9.3 G/DL (ref 13.6–17.2)
IMM GRANULOCYTES # BLD AUTO: 0.1 K/UL (ref 0–0.5)
IMM GRANULOCYTES NFR BLD AUTO: 1 % (ref 0–5)
LYMPHOCYTES # BLD: 1.1 K/UL (ref 0.5–4.6)
LYMPHOCYTES NFR BLD: 19 % (ref 13–44)
MCH RBC QN AUTO: 31.8 PG (ref 26.1–32.9)
MCHC RBC AUTO-ENTMCNC: 32.7 G/DL (ref 31.4–35)
MCV RBC AUTO: 97.3 FL (ref 79.6–97.8)
MONOCYTES # BLD: 0.6 K/UL (ref 0.1–1.3)
MONOCYTES NFR BLD: 10 % (ref 4–12)
NEUTS SEG # BLD: 3.9 K/UL (ref 1.7–8.2)
NEUTS SEG NFR BLD: 66 % (ref 43–78)
NRBC # BLD: 0 K/UL (ref 0–0.2)
PLATELET # BLD AUTO: 179 K/UL (ref 150–450)
PMV BLD AUTO: 10.3 FL (ref 9.4–12.3)
POTASSIUM SERPL-SCNC: 3.7 MMOL/L (ref 3.5–5.1)
PROT SERPL-MCNC: 6.2 G/DL (ref 6.3–8.2)
RBC # BLD AUTO: 2.92 M/UL (ref 4.23–5.6)
SODIUM SERPL-SCNC: 134 MMOL/L (ref 138–145)
WBC # BLD AUTO: 5.8 K/UL (ref 4.3–11.1)

## 2021-01-25 PROCEDURE — 94640 AIRWAY INHALATION TREATMENT: CPT

## 2021-01-25 PROCEDURE — 74011636637 HC RX REV CODE- 636/637: Performed by: INTERNAL MEDICINE

## 2021-01-25 PROCEDURE — 74011250636 HC RX REV CODE- 250/636: Performed by: INTERNAL MEDICINE

## 2021-01-25 PROCEDURE — 82962 GLUCOSE BLOOD TEST: CPT

## 2021-01-25 PROCEDURE — 74011636637 HC RX REV CODE- 636/637: Performed by: NURSE PRACTITIONER

## 2021-01-25 PROCEDURE — 85025 COMPLETE CBC W/AUTO DIFF WBC: CPT

## 2021-01-25 PROCEDURE — 36415 COLL VENOUS BLD VENIPUNCTURE: CPT

## 2021-01-25 PROCEDURE — 74011250637 HC RX REV CODE- 250/637: Performed by: INTERNAL MEDICINE

## 2021-01-25 PROCEDURE — 77030021668 HC NEB PREFIL KT VYRM -A

## 2021-01-25 PROCEDURE — 80053 COMPREHEN METABOLIC PANEL: CPT

## 2021-01-25 PROCEDURE — 65660000000 HC RM CCU STEPDOWN

## 2021-01-25 PROCEDURE — 77010033711 HC HIGH FLOW OXYGEN

## 2021-01-25 PROCEDURE — 94762 N-INVAS EAR/PLS OXIMTRY CONT: CPT

## 2021-01-25 RX ORDER — INSULIN LISPRO 100 [IU]/ML
4 INJECTION, SOLUTION INTRAVENOUS; SUBCUTANEOUS ONCE
Status: COMPLETED | OUTPATIENT
Start: 2021-01-25 | End: 2021-01-25

## 2021-01-25 RX ADMIN — ENOXAPARIN SODIUM 70 MG: 80 INJECTION SUBCUTANEOUS at 11:51

## 2021-01-25 RX ADMIN — ALBUTEROL SULFATE 2 PUFF: 108 INHALANT RESPIRATORY (INHALATION) at 08:02

## 2021-01-25 RX ADMIN — GUAIFENESIN 1200 MG: 600 TABLET ORAL at 08:56

## 2021-01-25 RX ADMIN — MONTELUKAST SODIUM 10 MG: 10 TABLET, FILM COATED ORAL at 21:20

## 2021-01-25 RX ADMIN — FUROSEMIDE 20 MG: 10 INJECTION INTRAMUSCULAR; INTRAVENOUS at 08:56

## 2021-01-25 RX ADMIN — Medication 10 ML: at 21:21

## 2021-01-25 RX ADMIN — INSULIN LISPRO 8 UNITS: 100 INJECTION, SOLUTION INTRAVENOUS; SUBCUTANEOUS at 16:18

## 2021-01-25 RX ADMIN — FUROSEMIDE 20 MG: 10 INJECTION INTRAMUSCULAR; INTRAVENOUS at 16:18

## 2021-01-25 RX ADMIN — ROSUVASTATIN 10 MG: 10 TABLET, FILM COATED ORAL at 21:20

## 2021-01-25 RX ADMIN — ALBUTEROL SULFATE 2 PUFF: 108 INHALANT RESPIRATORY (INHALATION) at 13:50

## 2021-01-25 RX ADMIN — INSULIN LISPRO 4 UNITS: 100 INJECTION, SOLUTION INTRAVENOUS; SUBCUTANEOUS at 21:20

## 2021-01-25 RX ADMIN — Medication 10 ML: at 06:03

## 2021-01-25 RX ADMIN — INSULIN LISPRO 2 UNITS: 100 INJECTION, SOLUTION INTRAVENOUS; SUBCUTANEOUS at 06:00

## 2021-01-25 RX ADMIN — ENOXAPARIN SODIUM 70 MG: 80 INJECTION SUBCUTANEOUS at 23:26

## 2021-01-25 RX ADMIN — INSULIN GLARGINE 10 UNITS: 100 INJECTION, SOLUTION SUBCUTANEOUS at 08:56

## 2021-01-25 RX ADMIN — Medication 10 ML: at 12:12

## 2021-01-25 RX ADMIN — CLOPIDOGREL BISULFATE 75 MG: 75 TABLET ORAL at 08:56

## 2021-01-25 RX ADMIN — GUAIFENESIN 1200 MG: 600 TABLET ORAL at 21:20

## 2021-01-25 RX ADMIN — BUDESONIDE AND FORMOTEROL FUMARATE DIHYDRATE 2 PUFF: 160; 4.5 AEROSOL RESPIRATORY (INHALATION) at 08:02

## 2021-01-25 RX ADMIN — INSULIN LISPRO 10 UNITS: 100 INJECTION, SOLUTION INTRAVENOUS; SUBCUTANEOUS at 21:20

## 2021-01-25 RX ADMIN — MAGNESIUM GLUCONATE 500 MG ORAL TABLET 400 MG: 500 TABLET ORAL at 08:56

## 2021-01-25 RX ADMIN — ASPIRIN 81 MG: 81 TABLET, COATED ORAL at 21:20

## 2021-01-25 RX ADMIN — ALBUTEROL SULFATE 2 PUFF: 108 INHALANT RESPIRATORY (INHALATION) at 20:03

## 2021-01-25 RX ADMIN — INSULIN LISPRO 8 UNITS: 100 INJECTION, SOLUTION INTRAVENOUS; SUBCUTANEOUS at 11:52

## 2021-01-25 RX ADMIN — BUDESONIDE AND FORMOTEROL FUMARATE DIHYDRATE 2 PUFF: 160; 4.5 AEROSOL RESPIRATORY (INHALATION) at 20:03

## 2021-01-25 RX ADMIN — LORATADINE 10 MG: 10 TABLET ORAL at 08:56

## 2021-01-25 NOTE — PROGRESS NOTES
Pt resting in chair comfortably at this time, alert and oriented times 4. No distress noted, respirations even and unlabored on AIRVO 35 L 44%. Pt denies pain at this time. Pt instructed to call for assistance if needed, call light in place, will continue to monitor. Will prepare for bedside shift report.

## 2021-01-25 NOTE — PROGRESS NOTES
Progress Note    Patient: Edmund Bruce MRN: [id number]  SSN: [id number]    YOB: 1945  Age: 67 y.o. Sex: male      Admit Date: 12/31/2020    LOS: 25 days     Subjective:   66 yo CM with past history of CAD s/p stents, ischemic cardiomyopathy, PAD (on Plavix and cilostazo), COPD, DM type II, HTN, HLD presents via EMS after fall at home and was admitted due to acute respiratory failure due to COVID and had an episode of VTach.  Family has refused for remdesivir and convalescent plasma.   Patient seen and examined at bedside. This morning still having some SOB, no CP, no abdominal pain, no nausea or vomiting.      Objective:     Vitals:    01/25/21 0802 01/25/21 1139 01/25/21 1200 01/25/21 1350   BP:  109/64     Pulse:  81 91    Resp:  24     Temp:  98.1 °F (36.7 °C)     SpO2: 96% 95%  94%   Weight:       Height:            Intake and Output:  Current Shift: 01/25 0701 - 01/25 1900  In: 600 [P.O.:600]  Out: 300 [Urine:300]  Last three shifts: 01/23 1901 - 01/25 0700  In: -   Out: 2686 [Urine:1350]    ROS  10 ROS negative except from stated on subjective    Physical Exam:   General: Alert, oriented, NAD  HEENT: NC/AT, EOM are intact  Neck: supple, no JVD  Cardiovascular: RRR, S1, S2, no murmurs  Respiratory: decrease breath sounds  Abdomen: Soft, NT, ND  Back: No CVA tenderness, no paraspinal tenderness  Extremities: LE without pedal edema, no erythema  Neuro: A&O, CN are intact, no focal deficits  Skin: no rash or ulcers  Psych: good mood and affect    Lab/Data Review:  I have personally reviewed patients laboratory data showing  Recent Results (from the past 24 hour(s))   GLUCOSE, POC    Collection Time: 01/24/21  4:56 PM   Result Value Ref Range    Glucose (POC) 312 (H) 65 - 100 mg/dL   GLUCOSE, POC    Collection Time: 01/24/21  8:56 PM   Result Value Ref Range    Glucose (POC) 305 (H) 65 - 100 mg/dL   GLUCOSE, POC    Collection Time: 01/25/21  5:46 AM   Result Value Ref Range    Glucose (POC) 198 (H) 65 - 501 mg/dL   METABOLIC PANEL, COMPREHENSIVE    Collection Time: 01/25/21  8:13 AM   Result Value Ref Range    Sodium 134 (L) 138 - 145 mmol/L    Potassium 3.7 3.5 - 5.1 mmol/L    Chloride 92 (L) 98 - 107 mmol/L    CO2 39 (H) 21 - 32 mmol/L    Anion gap 3 (L) 7 - 16 mmol/L    Glucose 179 (H) 65 - 100 mg/dL    BUN 11 8 - 23 MG/DL    Creatinine 0.80 0.8 - 1.5 MG/DL    GFR est AA >60 >60 ml/min/1.73m2    GFR est non-AA >60 >60 ml/min/1.73m2    Calcium 8.6 8.3 - 10.4 MG/DL    Bilirubin, total 0.5 0.2 - 1.1 MG/DL    ALT (SGPT) 80 (H) 12 - 65 U/L    AST (SGOT) 33 15 - 37 U/L    Alk. phosphatase 81 50 - 136 U/L    Protein, total 6.2 (L) 6.3 - 8.2 g/dL    Albumin 2.4 (L) 3.2 - 4.6 g/dL    Globulin 3.8 (H) 2.3 - 3.5 g/dL    A-G Ratio 0.6 (L) 1.2 - 3.5     CBC WITH AUTOMATED DIFF    Collection Time: 01/25/21  8:13 AM   Result Value Ref Range    WBC 5.8 4.3 - 11.1 K/uL    RBC 2.92 (L) 4.23 - 5.6 M/uL    HGB 9.3 (L) 13.6 - 17.2 g/dL    HCT 28.4 (L) 41.1 - 50.3 %    MCV 97.3 79.6 - 97.8 FL    MCH 31.8 26.1 - 32.9 PG    MCHC 32.7 31.4 - 35.0 g/dL    RDW 14.8 (H) 11.9 - 14.6 %    PLATELET 183 346 - 601 K/uL    MPV 10.3 9.4 - 12.3 FL    ABSOLUTE NRBC 0.00 0.0 - 0.2 K/uL    DF AUTOMATED      NEUTROPHILS 66 43 - 78 %    LYMPHOCYTES 19 13 - 44 %    MONOCYTES 10 4.0 - 12.0 %    EOSINOPHILS 3 0.5 - 7.8 %    BASOPHILS 1 0.0 - 2.0 %    IMMATURE GRANULOCYTES 1 0.0 - 5.0 %    ABS. NEUTROPHILS 3.9 1.7 - 8.2 K/UL    ABS. LYMPHOCYTES 1.1 0.5 - 4.6 K/UL    ABS. MONOCYTES 0.6 0.1 - 1.3 K/UL    ABS. EOSINOPHILS 0.2 0.0 - 0.8 K/UL    ABS. BASOPHILS 0.0 0.0 - 0.2 K/UL    ABS. IMM. GRANS. 0.1 0.0 - 0.5 K/UL   GLUCOSE, POC    Collection Time: 01/25/21 11:22 AM   Result Value Ref Range    Glucose (POC) 345 (H) 65 - 100 mg/dL        Image:  I have personally reviewed patients imaging showing  XR CHEST SNGL V   Final Result      1.  Bilateral diffuse airspace disease, similar to prior exam.      XR CHEST SNGL V   Final Result   Bilateral lung infiltrates, mildly progressed on the right and   mildly improved on the left. XR CHEST SNGL V   Final Result      XR CHEST SNGL V   Final Result   IMPRESSION: Unchanged bilateral lung infiltrates. US ABD LTD   Final Result   Impression:      1. Hepatomegaly         CPT code(s) 97834                  XR CHEST SNGL V   Final Result      DUPLEX LOWER EXT VENOUS BILAT   Final Result   IMPRESSION:   1. No evidence of deep venous thrombosis in either lower extremity. XR CHEST SNGL V   Final Result   IMPRESSION:    1.  Stable cardiomegaly and multifocal bilateral lung infiltrates. .         This report was made using voice transcription. Despite my best efforts to avoid   any, transcription errors may persist. If there is any question about the   accuracy of the report or need for clarification, then please call [phone], or text me through [employer] for clarification or correction. [employer]   Final Result   IMPRESSION:    1. No hydronephrosis or suspicious renal findings. 2. Incidental splenomegaly. XR CHEST SNGL V   Final Result   IMPRESSION: Marked worsening of the patient's bilateral atypical viral pneumonia   pattern. XR CHEST PORT   Final Result   IMPRESSION:    1. Mild right lung base infiltrate, suspect for acute pneumonia. 2. COPD.       XR CHEST SNGL V    (Results Pending)   XR CHEST SNGL V    (Results Pending)        Hospital problems     Principal Problem:    Acute respiratory failure with hypoxia ([address] (10/23/2014)    Active Problems:    Coronary artery disease involving native coronary artery of native heart without angina pectoris (10/12/2011)      Morbid obesity ([address] (10/12/2011)      DM (diabetes mellitus) type II, controlled, with peripheral vascular disorder ([address] (9/11/2014)      AGUSTIN (acute kidney injury) ([address] (9/15/2014)      COPD (chronic obstructive pulmonary disease) ([address] (4/10/2015)      PAD (peripheral artery disease) ([address] (11/10/2015)      Hyperlipidemia (11/10/2015)      CAP (community acquired pneumonia) (12/31/2020)      Monomorphic ventricular tachycardia ([facility] (12/31/2020)      COVID-19 (12/31/2020)      Elevated troponin (12/31/2020)        Assessment and Plan:   68 yo CM with past history of CAD s/p stents, ischemic cardiomyopathy, PAD (on Plavix and cilostazo), COPD, DM type II, HTN, HLD presents via EMS after fall at home and was admitted due to acute respiratory failure due to COVID     1. Acute respiratory failure with hypoxia secondary to COVID-19 pna  - On airvo, CPAP at night  - Completed Decadron  - Refused Remdesivir and convalescent plasma   - Continue home aerosols  - Completed antibiotics for possible CAP  - Wean oxygen as appropriate  - Continue Lasix     2. Monomorphic ventricular tachycardia  - Patient found to have monomorphic VT with EMS, converted spontaneously   - Given Mag in ER  - Cardiology saw him in ER and recommended correcting electrolytes  - Telemetry    3. Positive blood cultures likely contamination   - Initial blood cultures 12/23 with alpha strep + staph coagulase negative  - Repeated cultures NG     4. AGUSTIN  - Resolved     5. Elevated troponin likely demand ischemia   - No acute CP  - Continue ASA + Plavix  - Cardiology recs     6. DM  - A1C on 12/16/20 was 6.8  - Blood sugars in 300s, patient is on steroids   - Basal insulin   - Continue Humalog SSI  - BS ACHS     7. Coronary artery disease  - Continue ASA + Plavix  - Continue Crestor     8. COPD, not on exacerbation  - Continue home aerosols     9. Hyperlipidemia  - Continue Crestor     10. Transaminitis  - Abdominal U/S hepatomegaly  - Monitor LFTs      DVT ppx    I have reviewed, updated, and verified this note's content and spent 38 minutes of my 42 minutes visit performing counseling and coordination of care regarding medical management.       Signed By: Amada Ng MD     January 25, 2021

## 2021-01-25 NOTE — DIABETES MGMT
Patient admitted with acute respiratory failure with hypoxia, positive for COVID-19. Blood glucose ranged 205-312 yesterday with patient receiving Lantus 10 units and Humalog 26 units. Blood glucose this morning was 198. Reviewed patient current regimen: Lantus 10 units daily and Humalog SSI. Patient would likely benefit from a small increase in basal insulin if stricter glycemic control is desired. Provider updated via [employer] regarding patient glycemic control and recommendations.

## 2021-01-25 NOTE — PROGRESS NOTES
KAUR FRANCIS:  Patient currently on 51/55 Airvo. [facility] will admit when bed available. Case Management will continue to follow.

## 2021-01-25 NOTE — PROGRESS NOTES
reviewed notes for spiritual concerns   none noted   staff continues to give very compassionate care

## 2021-01-25 NOTE — PROGRESS NOTES
Pt resting in bed comfortably at this time, alert and oriented times 4. No distress noted, respirations even and unlabored on AIRVO 50 L 55%. Pt denies pain at this time. Pt instructed to call for assistance if needed, call light in place, will continue to monitor.

## 2021-01-26 ENCOUNTER — APPOINTMENT (OUTPATIENT)
Dept: GENERAL RADIOLOGY | Age: 76
DRG: 177 | End: 2021-01-26
Attending: INTERNAL MEDICINE
Payer: MEDICARE

## 2021-01-26 ENCOUNTER — HOSPITAL ENCOUNTER (OUTPATIENT)
Age: 76
Discharge: HOME | End: 2021-02-08
Attending: INTERNAL MEDICINE | Admitting: INTERNAL MEDICINE

## 2021-01-26 VITALS
BODY MASS INDEX: 33.37 KG/M2 | OXYGEN SATURATION: 93 % | HEART RATE: 85 BPM | RESPIRATION RATE: 20 BRPM | TEMPERATURE: 98.3 F | DIASTOLIC BLOOD PRESSURE: 61 MMHG | SYSTOLIC BLOOD PRESSURE: 113 MMHG | HEIGHT: 73 IN | WEIGHT: 251.8 LBS

## 2021-01-26 LAB
ALBUMIN SERPL-MCNC: 2.4 G/DL (ref 3.2–4.6)
ALBUMIN/GLOB SERPL: 0.6 {RATIO} (ref 1.2–3.5)
ALP SERPL-CCNC: 80 U/L (ref 50–136)
ALT SERPL-CCNC: 71 U/L (ref 12–65)
ANION GAP SERPL CALC-SCNC: 3 MMOL/L (ref 7–16)
AST SERPL-CCNC: 20 U/L (ref 15–37)
BASOPHILS # BLD: 0.1 K/UL (ref 0–0.2)
BASOPHILS NFR BLD: 1 % (ref 0–2)
BILIRUB SERPL-MCNC: 0.4 MG/DL (ref 0.2–1.1)
BUN SERPL-MCNC: 10 MG/DL (ref 8–23)
CALCIUM SERPL-MCNC: 8.7 MG/DL (ref 8.3–10.4)
CHLORIDE SERPL-SCNC: 93 MMOL/L (ref 98–107)
CO2 SERPL-SCNC: 37 MMOL/L (ref 21–32)
CREAT SERPL-MCNC: 0.86 MG/DL (ref 0.8–1.5)
DIFFERENTIAL METHOD BLD: ABNORMAL
EOSINOPHIL # BLD: 0.2 K/UL (ref 0–0.8)
EOSINOPHIL NFR BLD: 2 % (ref 0.5–7.8)
ERYTHROCYTE [DISTWIDTH] IN BLOOD BY AUTOMATED COUNT: 14.7 % (ref 11.9–14.6)
GLOBULIN SER CALC-MCNC: 4.1 G/DL (ref 2.3–3.5)
GLUCOSE BLD STRIP.AUTO-MCNC: 224 MG/DL (ref 65–100)
GLUCOSE BLD STRIP.AUTO-MCNC: 286 MG/DL (ref 65–100)
GLUCOSE SERPL-MCNC: 207 MG/DL (ref 65–100)
HCT VFR BLD AUTO: 27.1 % (ref 41.1–50.3)
HGB BLD-MCNC: 9.1 G/DL (ref 13.6–17.2)
IMM GRANULOCYTES # BLD AUTO: 0.1 K/UL (ref 0–0.5)
IMM GRANULOCYTES NFR BLD AUTO: 1 % (ref 0–5)
LYMPHOCYTES # BLD: 1.2 K/UL (ref 0.5–4.6)
LYMPHOCYTES NFR BLD: 12 % (ref 13–44)
MCH RBC QN AUTO: 32.4 PG (ref 26.1–32.9)
MCHC RBC AUTO-ENTMCNC: 33.6 G/DL (ref 31.4–35)
MCV RBC AUTO: 96.4 FL (ref 79.6–97.8)
MONOCYTES # BLD: 1.1 K/UL (ref 0.1–1.3)
MONOCYTES NFR BLD: 11 % (ref 4–12)
NEUTS SEG # BLD: 7.4 K/UL (ref 1.7–8.2)
NEUTS SEG NFR BLD: 74 % (ref 43–78)
NRBC # BLD: 0.02 K/UL (ref 0–0.2)
PLATELET # BLD AUTO: 188 K/UL (ref 150–450)
PMV BLD AUTO: 10.6 FL (ref 9.4–12.3)
POTASSIUM SERPL-SCNC: 3.4 MMOL/L (ref 3.5–5.1)
PROT SERPL-MCNC: 6.5 G/DL (ref 6.3–8.2)
RBC # BLD AUTO: 2.81 M/UL (ref 4.23–5.6)
SODIUM SERPL-SCNC: 133 MMOL/L (ref 136–145)
WBC # BLD AUTO: 10.1 K/UL (ref 4.3–11.1)

## 2021-01-26 PROCEDURE — 74011250636 HC RX REV CODE- 250/636: Performed by: INTERNAL MEDICINE

## 2021-01-26 PROCEDURE — 85025 COMPLETE CBC W/AUTO DIFF WBC: CPT

## 2021-01-26 PROCEDURE — 74011636637 HC RX REV CODE- 636/637: Performed by: INTERNAL MEDICINE

## 2021-01-26 PROCEDURE — 74011250637 HC RX REV CODE- 250/637: Performed by: INTERNAL MEDICINE

## 2021-01-26 PROCEDURE — 2709999900 HC NON-CHARGEABLE SUPPLY

## 2021-01-26 PROCEDURE — 80053 COMPREHEN METABOLIC PANEL: CPT

## 2021-01-26 PROCEDURE — 77010033711 HC HIGH FLOW OXYGEN

## 2021-01-26 PROCEDURE — 82962 GLUCOSE BLOOD TEST: CPT

## 2021-01-26 PROCEDURE — 94762 N-INVAS EAR/PLS OXIMTRY CONT: CPT

## 2021-01-26 PROCEDURE — 74011636637 HC RX REV CODE- 636/637: Performed by: NURSE PRACTITIONER

## 2021-01-26 PROCEDURE — 94640 AIRWAY INHALATION TREATMENT: CPT

## 2021-01-26 PROCEDURE — 71045 X-RAY EXAM CHEST 1 VIEW: CPT

## 2021-01-26 PROCEDURE — 94761 N-INVAS EAR/PLS OXIMETRY MLT: CPT

## 2021-01-26 PROCEDURE — 36415 COLL VENOUS BLD VENIPUNCTURE: CPT

## 2021-01-26 RX ORDER — INSULIN LISPRO 100 [IU]/ML
1-10 INJECTION, SOLUTION INTRAVENOUS; SUBCUTANEOUS
Qty: 1 VIAL | Refills: 0 | Status: SHIPPED
Start: 2021-01-26 | End: 2021-02-11 | Stop reason: ALTCHOICE

## 2021-01-26 RX ORDER — LANOLIN ALCOHOL/MO/W.PET/CERES
400 CREAM (GRAM) TOPICAL DAILY
Qty: 30 TAB | Refills: 0 | Status: SHIPPED
Start: 2021-01-27 | End: 2021-03-16 | Stop reason: SDUPTHER

## 2021-01-26 RX ORDER — FUROSEMIDE 40 MG/1
40 TABLET ORAL DAILY
Qty: 30 TAB | Refills: 0 | Status: SHIPPED
Start: 2021-01-26 | End: 2021-02-12

## 2021-01-26 RX ORDER — INSULIN GLARGINE 100 [IU]/ML
10 INJECTION, SOLUTION SUBCUTANEOUS DAILY
Qty: 1 VIAL | Refills: 0 | Status: SHIPPED
Start: 2021-01-26 | End: 2021-02-11 | Stop reason: SDUPTHER

## 2021-01-26 RX ADMIN — Medication 10 ML: at 12:07

## 2021-01-26 RX ADMIN — LORATADINE 10 MG: 10 TABLET ORAL at 08:47

## 2021-01-26 RX ADMIN — GUAIFENESIN 1200 MG: 600 TABLET ORAL at 08:47

## 2021-01-26 RX ADMIN — INSULIN GLARGINE 10 UNITS: 100 INJECTION, SOLUTION SUBCUTANEOUS at 08:48

## 2021-01-26 RX ADMIN — INSULIN LISPRO 6 UNITS: 100 INJECTION, SOLUTION INTRAVENOUS; SUBCUTANEOUS at 12:07

## 2021-01-26 RX ADMIN — ENOXAPARIN SODIUM 70 MG: 80 INJECTION SUBCUTANEOUS at 12:07

## 2021-01-26 RX ADMIN — MAGNESIUM GLUCONATE 500 MG ORAL TABLET 400 MG: 500 TABLET ORAL at 08:47

## 2021-01-26 RX ADMIN — ALBUTEROL SULFATE 2 PUFF: 108 INHALANT RESPIRATORY (INHALATION) at 07:52

## 2021-01-26 RX ADMIN — CLOPIDOGREL BISULFATE 75 MG: 75 TABLET ORAL at 08:47

## 2021-01-26 RX ADMIN — Medication 10 ML: at 06:33

## 2021-01-26 RX ADMIN — FUROSEMIDE 20 MG: 10 INJECTION INTRAMUSCULAR; INTRAVENOUS at 08:47

## 2021-01-26 RX ADMIN — INSULIN LISPRO 4 UNITS: 100 INJECTION, SOLUTION INTRAVENOUS; SUBCUTANEOUS at 06:33

## 2021-01-26 RX ADMIN — BUDESONIDE AND FORMOTEROL FUMARATE DIHYDRATE 2 PUFF: 160; 4.5 AEROSOL RESPIRATORY (INHALATION) at 07:52

## 2021-01-26 NOTE — PROGRESS NOTES
01/26/21 0214   Oxygen Therapy   O2 Sat (%) 98 %   O2 Device Hi flow nasal cannula   O2 Flow Rate (L/min) 10 l/min     Patient taken off airvo and placed on HFNC. No distress noted.

## 2021-01-26 NOTE — DISCHARGE SUMMARY
Date of Admission: 12/31/2020  Date of Discharge: 1/26/2021    Discharge Diagnoses:  1. Acute respiratory failure with hypoxia secondary to COVID-19 pna     Discharge Medications:  Discharge Medication List as of 1/26/2021  2:36 PM      START taking these medications    Details   furosemide (LASIX) 40 mg tablet Take 1 Tab by mouth daily. , No Print, Disp-30 Tab, R-0      insulin glargine (LANTUS) 100 unit/mL injection 10 Units by SubCUTAneous route daily. , No Print, Disp-1 Vial, R-0      magnesium oxide (MAG-OX) 400 mg tablet Take 1 Tab by mouth daily. , No Print, Disp-30 Tab, R-0      insulin lispro (HUMALOG) 100 unit/mL injection 1-10 Units by SubCUTAneous route Before breakfast, lunch, dinner and at bedtime.  Sliding scale, No Print, Disp-1 Vial, R-0         CONTINUE these medications which have NOT CHANGED    Details   ProAir HFA 90 mcg/actuation inhaler Take 2 Puffs by inhalation four (4) times daily as needed for Wheezing or Shortness of Breath., Normal, Disp-1 Inhaler, R-11, YOKO      clopidogreL (PLAVIX) 75 mg tab TAKE ONE TABLET BY MOUTH EVERY DAY, Normal, Disp-90 Tab,R-3* * N O T I C E * * Last quantity doesn't match original quantity      montelukast (SINGULAIR) 10 mg tablet TAKE 1 TABLET BY MOUTH EVERY DAY AT BEDTIME, Normal, Disp-90 Tab, R-3Generic For:*SINGULAIR 10MG 08/07/2019 10:40:28 AM  N O T I C E      fluticasone propion-salmeteroL (ADVAIR/WIXELA) 250-50 mcg/dose diskus inhaler USE 1 PUFF TWICE DAILY; RINSE MOUTH AFTER EACH USE., Normal, Disp-60 Each, R-11      fluticasone propionate (FLONASE) 50 mcg/actuation nasal spray USE 1 OR 2 SPRAYS IN EACH NOSTRIL EVERY DAY, Normal, Disp-48 g, R-3      rosuvastatin (CRESTOR) 10 mg tablet TAKE ONE TABLET BY MOUTH ONCE A DAY, Normal, Disp-90 Tab, R-2      metFORMIN (GLUCOPHAGE) 500 mg tablet TAKE TWO TABLETS BY MOUTH 2 TIMES A DAY, Normal, Disp-120 Tab, R-11      guaiFENesin (MUCINEX) 1,200 mg Ta12 ER tablet Take 1,200 mg by mouth two (2) times daily as needed., Historical Med      ascorbic acid, vitamin C, (VITAMIN C) 500 mg tablet Take 500 mg by mouth nightly., Historical Med      glucose blood VI test strips (ASCENSIA AUTODISC VI, ONE TOUCH ULTRA TEST VI) strip E11.9, Normal, Disp-100 Strip, R-11      glucosamine/chondr funez A sod (GLUCOSAMINE-CHONDROITIN) 750-600 mg tab Take  by mouth two (2) times a day., Historical Med      cholecalciferol, vitamin D3, (VITAMIN D3) 2,000 unit tab Take 2,000 Units by mouth nightly., Historical Med      diclofenac (VOLTAREN) 1 % gel Apply 4 g to affected area four (4) times daily as needed., Normal, Disp-5 Each, R-1      FISH OIL CONCENTRATE 1,000 mg cap Take 1-2 Caps by mouth two (2) times a day. One tab in am & 2 tabs in pm, Historical Med, YOKO      meclizine (ANTIVERT) 25 mg tablet Take 25 mg by mouth three (3) times daily as needed., Historical Med      nitroglycerin (NITROSTAT) 0.4 mg SL tablet 0.4 mg by SubLINGual route every five (5) minutes as needed for Chest Pain., Historical Med      cetirizine (ZYRTEC) 10 mg tablet Take  by mouth as needed for Allergies. , Historical Med      garlic 7,784 mg cap Take 1 Tab by mouth two (2) times a day., Historical Med      MULTIVITS-MINERALS/FA/LYCOPENE (ONE-A-DAY MEN'S MULTIVITAMIN PO) Take 1 Tab by mouth daily. , Historical Med      cpap machine kit Take  by inhalation.  qhs, Historical Med      aspirin 81 mg tablet Take 81 mg by mouth nightly., Historical Med         STOP taking these medications       trimethoprim-sulfamethoxazole (BACTRIM DS, SEPTRA DS) 160-800 mg per tablet Comments:   Reason for Stopping:         amLODIPine-valsartan (EXFORGE)  mg per tablet Comments:   Reason for Stopping:         hydroCHLOROthiazide (HYDRODIURIL) 25 mg tablet Comments:   Reason for Stopping:         cilostazoL (PLETAL) 50 mg tablet Comments:   Reason for Stopping:         JANUVIA 100 mg tablet Comments:   Reason for Stopping:         glipiZIDE SR (GLUCOTROL XL) 10 mg CR tablet Comments: Reason for Stopping:         cefUROXime (CEFTIN) 500 mg tablet Comments:   Reason for Stopping:                Pending Labs:  None    Follow-up (including scheduled tests): Follow-up Information     Follow up With Specialties Details Why Contact Info    Apoorva Liang MD Internal Medicine   [address] [place] [address]  [phone]             History of Present Illness:  68 yo CM with past history of CAD s/p stents, ischemic cardiomyopathy, PAD (on Plavix and cilostazo), COPD, DM type II, HTN, HLD presents via EMS after fall at home. Patient was getting up out of bed to go to the bathroom when he slipped and fell on the floor. He did not lose consciousness or black out. He did have diarrhea when he was down on the ground, he was not able to get up. He says he had been feeling weaker the past several days. He says that his daughter recently tested positive for COVID. He denies fevers/chills, chest pain, or shortness of breath. EMS was called and patient was found to be in monomorphic Vtach with HR of 250 bpm that spontaneously converted on its own without any intervention.      ED Course: SCr of 1.8 (baseline around 1.3). Sodium of 127. LA of 1.9. Troponin of 73. Procalcitonin of 0.11. CXR shows right-sided opacity. Blood cultures collected x2. Mg of 1.7. Given calcium gluconate, Mg sulfate 2 gm, and NS bolus. Hospitalist consulted for admission.      Past Medical History:  Past Medical History:   Diagnosis Date    AGUSTIN (acute kidney injury) ([facility] [address] 9/15/2014    Allergic rhinitis, CAUSE UNSPECIFIED 11/10/2015    Asthma; SEASONAL 11/10/2015    Benign essential hypertension 11/10/2015    Benign neoplasm of colon 11/10/2015    CAD (coronary artery disease) 1998, 1999    mix2, 3 stents [id number]    CAD (coronary artery disease) 11/10/2015    Cardiomyopathy ([facility] [address] 05/46/8902    Complicated wound infection 11/10/2015    COPD /OTHER 11/10/2015    COPD exacerbation ([facility] [address] 10/12/2011    Diabetes mellitus type 2, controlled ([facility] [address] 11/10/2015    Diabetic neuropathy  11/10/2015    Disruption of wound, unspecified 10/9/2014    Encounter for long-term (current) use of other high-risk medications 11/10/2015    Extremity atherosclerosis with intermittent claudication ([address] 11/10/2015    H/O endarterectomy; 9/2014 11/10/2015    Hiatal hernia 11/10/2015    Hyperglycemia  11/10/2015    Hyperlipidemia 11/10/2015    Myocardial infarction ([address] 1999    Myocardial infarction ([facility] [address] (1999) 11/10/2015    Obesity, UNSPECIFIED 11/10/2015    Orthostatic hypotension 11/10/2015    Osteoarthritis 11/10/2015    Peripheral vascular disease ([facility] [address] 2/2014; S/P BILAT FEMORAL 11/10/2015    PVC (premature ventricular contraction)     Rash 13/12/3474    Seroma complicating a procedure 10/2/2014    Sleep apnea     cpap    Uncontrolled type 2 diabetes mellitus ([facility] [address] 11/10/2015    Vertiginous syndromes & LABYRINTHINE DISORDERS/UNSPEC 11/10/2015       Allergies: Allergies   Allergen Reactions    Daypro [Oxaprozin] Other (comments)     ELEVATED BLOOD PRESSURE       Hospital Course:  66 yo CM with past history of CAD s/p stents, ischemic cardiomyopathy, PAD (on Plavix and cilostazo), COPD, DM type II, HTN, HLD presents via EMS after fall at home and was admitted due to acute respiratory failure due to COVID      1. Acute respiratory failure with hypoxia secondary to COVID-19 pna  - Started on O2 therapy to maintain O2 Sat>92%  - Initially on Airvo and able to wean off to NC  - CPAP at night  - Completed Decadron  - Refused Remdesivir and convalescent plasma   - Continue home aerosols  - Completed antibiotics for possible CAP  - Started Lasix  - Improved symptomatology  - On O2 7L on discharge saturating 95%  - Hemodynamically stable on discharge      2.  Monomorphic ventricular tachycardia  - Patient found to have monomorphic VT with EMS, converted spontaneously   - Given Mag in ER  - Cardiology saw him in ER and recommended correcting electrolytes  - Placed on telemetry without further events    Procedures:  None    Discharge Day Information:  Follow with PMD    Diet: Cardiac / diabetic    Activity: as tolerated    Discharge Physical Exam:  General: Alert, oriented, NAD  HEENT: NC/AT, EOM are intact  Neck: supple, no JVD  Cardiovascular: RRR, S1, S2, no murmurs  Respiratory: Lungs are clear, no wheezes or rales  Abdomen: Soft, NT, ND  Back: No CVA tenderness, no paraspinal tenderness  Extremities: LE without pedal edema, no erythema  Neuro: A&O, CN are intact, no focal deficits  Skin: no rash or ulcers  Psych: good mood and affect    Recent Results (from the past 24 hour(s))   GLUCOSE, POC    Collection Time: 01/25/21  3:53 PM   Result Value Ref Range    Glucose (POC) 335 (H) 65 - 100 mg/dL   GLUCOSE, POC    Collection Time: 01/25/21  8:36 PM   Result Value Ref Range    Glucose (POC) 402 (H) 65 - 100 mg/dL   GLUCOSE, POC    Collection Time: 01/25/21 11:18 PM   Result Value Ref Range    Glucose (POC) 268 (H) 65 - 100 mg/dL   GLUCOSE, POC    Collection Time: 01/26/21  5:37 AM   Result Value Ref Range    Glucose (POC) 224 (H) 65 - 995 mg/dL   METABOLIC PANEL, COMPREHENSIVE    Collection Time: 01/26/21  7:21 AM   Result Value Ref Range    Sodium 133 (L) 136 - 145 mmol/L    Potassium 3.4 (L) 3.5 - 5.1 mmol/L    Chloride 93 (L) 98 - 107 mmol/L    CO2 37 (H) 21 - 32 mmol/L    Anion gap 3 (L) 7 - 16 mmol/L    Glucose 207 (H) 65 - 100 mg/dL    BUN 10 8 - 23 MG/DL    Creatinine 0.86 0.8 - 1.5 MG/DL    GFR est AA >60 >60 ml/min/1.73m2    GFR est non-AA >60 >60 ml/min/1.73m2    Calcium 8.7 8.3 - 10.4 MG/DL    Bilirubin, total 0.4 0.2 - 1.1 MG/DL    ALT (SGPT) 71 (H) 12 - 65 U/L    AST (SGOT) 20 15 - 37 U/L    Alk.  phosphatase 80 50 - 136 U/L    Protein, total 6.5 6.3 - 8.2 g/dL    Albumin 2.4 (L) 3.2 - 4.6 g/dL    Globulin 4.1 (H) 2.3 - 3.5 g/dL    A-G Ratio 0.6 (L) 1.2 - 3.5     CBC WITH AUTOMATED DIFF    Collection Time: 01/26/21 7:21 AM   Result Value Ref Range    WBC 10.1 4.3 - 11.1 K/uL    RBC 2.81 (L) 4.23 - 5.6 M/uL    HGB 9.1 (L) 13.6 - 17.2 g/dL    HCT 27.1 (L) 41.1 - 50.3 %    MCV 96.4 79.6 - 97.8 FL    MCH 32.4 26.1 - 32.9 PG    MCHC 33.6 31.4 - 35.0 g/dL    RDW 14.7 (H) 11.9 - 14.6 %    PLATELET 772 882 - 668 K/uL    MPV 10.6 9.4 - 12.3 FL    ABSOLUTE NRBC 0.02 0.0 - 0.2 K/uL    DF AUTOMATED      NEUTROPHILS 74 43 - 78 %    LYMPHOCYTES 12 (L) 13 - 44 %    MONOCYTES 11 4.0 - 12.0 %    EOSINOPHILS 2 0.5 - 7.8 %    BASOPHILS 1 0.0 - 2.0 %    IMMATURE GRANULOCYTES 1 0.0 - 5.0 %    ABS. NEUTROPHILS 7.4 1.7 - 8.2 K/UL    ABS. LYMPHOCYTES 1.2 0.5 - 4.6 K/UL    ABS. MONOCYTES 1.1 0.1 - 1.3 K/UL    ABS. EOSINOPHILS 0.2 0.0 - 0.8 K/UL    ABS. BASOPHILS 0.1 0.0 - 0.2 K/UL    ABS. IMM. GRANS. 0.1 0.0 - 0.5 K/UL   GLUCOSE, POC    Collection Time: 01/26/21 11:33 AM   Result Value Ref Range    Glucose (POC) 286 (H) 65 - 100 mg/dL        XR CHEST SNGL V   Final Result   Stable diffuse heterogeneous opacities throughout both lungs. XR CHEST SNGL V   Final Result      1. Bilateral diffuse airspace disease, similar to prior exam.      XR CHEST SNGL V   Final Result   Bilateral lung infiltrates, mildly progressed on the right and   mildly improved on the left. XR CHEST SNGL V   Final Result      XR CHEST SNGL V   Final Result   IMPRESSION: Unchanged bilateral lung infiltrates. US ABD LTD   Final Result   Impression:      1. Hepatomegaly         CPT code(s) 66634                  XR CHEST SNGL V   Final Result      DUPLEX LOWER EXT VENOUS BILAT   Final Result   IMPRESSION:   1. No evidence of deep venous thrombosis in either lower extremity. XR CHEST SNGL V   Final Result   IMPRESSION:    1.  Stable cardiomegaly and multifocal bilateral lung infiltrates. .         This report was made using voice transcription.  Despite my best efforts to avoid   any, transcription errors may persist. If there is any question about the accuracy of the report or need for clarification, then please call [phone], or text me through perfectserv for clarification or correction. US RETROPERITONEUM LTD   Final Result   IMPRESSION:    1. No hydronephrosis or suspicious renal findings. 2. Incidental splenomegaly. XR CHEST SNGL V   Final Result   IMPRESSION: Marked worsening of the patient's bilateral atypical viral pneumonia   pattern. XR CHEST PORT   Final Result   IMPRESSION:    1. Mild right lung base infiltrate, suspect for acute pneumonia. 2. COPD.       XR CHEST SNGL V    (Results Pending)        Condition: Improved    Disposition: Acute inpatient rehab     Consultants During This Hospitalization: pulmonary

## 2021-01-26 NOTE — PROGRESS NOTES
Pt blood sugar is 402. Dr. Cramer Mom was mesaged via perfect serve and gave verbal order to give an additional 4 unit with the 10 Units of Humalog from the sliding scale order.  A total of 14Units of Humalog will be given for BS of 402

## 2021-01-26 NOTE — PROGRESS NOTES
Oxygen Qualifier       Room air: SpO2 with O2 and liter flow   Resting SpO2    90% on 4L   Ambulating SpO2    90% on 5L / standing - unable to walk     Patient does qualify for home oxygen.     Completed by:    Isaac Rosen

## 2021-01-26 NOTE — PROGRESS NOTES
Pt resting in bed comfortably at this time, alert and oriented times 4. No distress noted, respirations even and unlabored on 10 L NC high flow. Pt denies pain at this time. Pt instructed to call for assistance if needed, call light in place, will continue to monitor.

## 2021-01-26 NOTE — PROGRESS NOTES
MSN, CM:  Attempted to contact patient's wife with no success; left VM. Patient will be transferred to [facility] today. Will attempt to contact wife again.

## 2021-01-26 NOTE — PROGRESS NOTES
Pt is resting in recliner at this time. Pt is alert and oriented times 4. Pt is on Airvo 35L 45%. Pt has no s/sx of acute distress at this time. Pt has no complaints at this time. Pt has safety measures in place. Pt has call light within reach and is encouraged to call for assistance if needed. Will continue to monitor.

## 2021-01-26 NOTE — PROGRESS NOTES
Pt is resting in bed at this time. Pt is on 10L NC. Pt is on continuous pulse ox sating 96%. Pt has safety measures in place Pt has call light within reach and is encouraged to call for assistance if needed.  Report given to [facility]

## 2021-01-26 NOTE — PROGRESS NOTES
MSN, CM:  Patient to be discharged today to [facility]). Patient and wife agree with this discharge plan. Patient has met all milestones for this admission. Staff to transport patient to facility on 4th floor. Care Management Interventions  PCP Verified by CM: Rosangela Spence MD)  Mode of Transport at Discharge:  Other (see comment)(floor staff)  Transition of Care Consult (CM Consult): LTAC  Discharge Durable Medical Equipment: No(CPap @ home)  Physical Therapy Consult: Yes  Occupational Therapy Consult: Yes  Speech Therapy Consult: No  Current Support Network: Lives with Spouse, Own Home  Confirm Follow Up Transport: Family  The Patient and/or Patient Representative was Provided with a Choice of Provider and Agrees with the Discharge Plan?: Yes  Freedom of Choice List was Provided with Basic Dialogue that Supports the Patient's Individualized Plan of Care/Goals, Treatment Preferences and Shares the Quality Data Associated with the Providers?: Yes  [employer] Information Provided?: No  Discharge Location  Discharge Placement: [address] [place] [address]

## 2021-01-26 NOTE — PROGRESS NOTES
TRANSFER - OUT REPORT:    Verbal report given to [facility] RN(name) on Lynn Esteves  being transferred to [facility] 4th floor(unit) for routine progression of care       Report consisted of patients Situation, Background, Assessment and   Recommendations(SBAR). Information from the following report(s) SBAR, Kardex, ED Summary, Intake/Output and MAR was reviewed with the receiving nurse. Lines:   Peripheral IV 01/13/21 Anterior; Left Forearm (Active)   Site Assessment Clean, dry, & intact 01/26/21 0736   Phlebitis Assessment 0 01/26/21 0736   Infiltration Assessment 0 01/26/21 0736   Dressing Status Clean, dry, & intact 01/26/21 0736   Dressing Type Tape;Transparent 01/26/21 0736   Hub Color/Line Status Patent 01/26/21 0736   Alcohol Cap Used No 01/23/21 0759        Opportunity for questions and clarification was provided.       Patient transported with:   O2 @ 4 liters

## 2021-01-26 NOTE — DIABETES MGMT
Patient admitted with acute respiratory failure with hypoxia positive for COVID-19. Blood glucose ranged 179-402 yesterday with patient receiving Lantus 10 units and Humalog 32 units. Blood glucose this morning was 224. Reviewed patient current regimen: Lantus 10 units daily and Humalog SSI. Patient has consistent carb diet ordered with nepro at breakfast. Patient would likely benefit from an increase in basal insulin as fasting blood glucose is not at goal. Provider could also consider initiation of prandial insulin to help offset hyperglycemia during the day related to caloric intake if stricter glycemic control is desired. Provider updated via [employer] regarding recommendations and patient glycemic control.

## 2021-01-27 LAB
ANION GAP SERPL CALC-SCNC: 9 MMOL/L (ref 7–16)
BUN SERPL-MCNC: 13 MG/DL (ref 8–23)
CALCIUM SERPL-MCNC: 8.5 MG/DL (ref 8.3–10.4)
CHLORIDE SERPL-SCNC: 92 MMOL/L (ref 98–107)
CO2 SERPL-SCNC: 30 MMOL/L (ref 21–32)
CREAT SERPL-MCNC: 0.99 MG/DL (ref 0.8–1.5)
GLUCOSE SERPL-MCNC: 267 MG/DL (ref 65–100)
MAGNESIUM SERPL-MCNC: 1.9 MG/DL (ref 1.8–2.4)
PHOSPHATE SERPL-MCNC: 3.7 MG/DL (ref 2.3–3.7)
POTASSIUM SERPL-SCNC: 4.1 MMOL/L (ref 3.5–5.1)
SODIUM SERPL-SCNC: 131 MMOL/L (ref 136–145)

## 2021-01-27 PROCEDURE — 36415 COLL VENOUS BLD VENIPUNCTURE: CPT

## 2021-01-27 PROCEDURE — 83735 ASSAY OF MAGNESIUM: CPT

## 2021-01-27 PROCEDURE — 84100 ASSAY OF PHOSPHORUS: CPT

## 2021-01-27 PROCEDURE — 80048 BASIC METABOLIC PNL TOTAL CA: CPT

## 2021-01-28 LAB — MAGNESIUM SERPL-MCNC: 2 MG/DL (ref 1.8–2.4)

## 2021-01-28 PROCEDURE — 36415 COLL VENOUS BLD VENIPUNCTURE: CPT

## 2021-01-28 PROCEDURE — 83735 ASSAY OF MAGNESIUM: CPT

## 2021-02-01 LAB
ALBUMIN SERPL-MCNC: 2.2 G/DL (ref 3.2–4.6)
ALBUMIN/GLOB SERPL: 0.5 {RATIO} (ref 1.2–3.5)
ALP SERPL-CCNC: 110 U/L (ref 50–136)
ALT SERPL-CCNC: 138 U/L (ref 12–65)
ANION GAP SERPL CALC-SCNC: 6 MMOL/L (ref 7–16)
AST SERPL-CCNC: 49 U/L (ref 15–37)
BASOPHILS # BLD: 0.1 K/UL (ref 0–0.2)
BASOPHILS NFR BLD: 1 % (ref 0–2)
BILIRUB SERPL-MCNC: 0.4 MG/DL (ref 0.2–1.1)
BUN SERPL-MCNC: 12 MG/DL (ref 8–23)
CALCIUM SERPL-MCNC: 9 MG/DL (ref 8.3–10.4)
CHLORIDE SERPL-SCNC: 98 MMOL/L (ref 98–107)
CO2 SERPL-SCNC: 34 MMOL/L (ref 21–32)
CREAT SERPL-MCNC: 0.86 MG/DL (ref 0.8–1.5)
DIFFERENTIAL METHOD BLD: ABNORMAL
EOSINOPHIL # BLD: 0.3 K/UL (ref 0–0.8)
EOSINOPHIL NFR BLD: 4 % (ref 0.5–7.8)
ERYTHROCYTE [DISTWIDTH] IN BLOOD BY AUTOMATED COUNT: 15.2 % (ref 11.9–14.6)
GLOBULIN SER CALC-MCNC: 4.4 G/DL (ref 2.3–3.5)
GLUCOSE SERPL-MCNC: 184 MG/DL (ref 65–100)
HCT VFR BLD AUTO: 31.2 % (ref 41.1–50.3)
HGB BLD-MCNC: 9.7 G/DL (ref 13.6–17.2)
IMM GRANULOCYTES # BLD AUTO: 0.1 K/UL (ref 0–0.5)
IMM GRANULOCYTES NFR BLD AUTO: 1 % (ref 0–5)
LYMPHOCYTES # BLD: 1.5 K/UL (ref 0.5–4.6)
LYMPHOCYTES NFR BLD: 22 % (ref 13–44)
MCH RBC QN AUTO: 30.8 PG (ref 26.1–32.9)
MCHC RBC AUTO-ENTMCNC: 31.1 G/DL (ref 31.4–35)
MCV RBC AUTO: 99 FL (ref 79.6–97.8)
MONOCYTES # BLD: 0.7 K/UL (ref 0.1–1.3)
MONOCYTES NFR BLD: 11 % (ref 4–12)
NEUTS SEG # BLD: 3.9 K/UL (ref 1.7–8.2)
NEUTS SEG NFR BLD: 61 % (ref 43–78)
NRBC # BLD: 0 K/UL (ref 0–0.2)
PLATELET # BLD AUTO: 238 K/UL (ref 150–450)
PLATELET COMMENTS,PCOM: ADEQUATE
PMV BLD AUTO: 10.3 FL (ref 9.4–12.3)
POTASSIUM SERPL-SCNC: 4.6 MMOL/L (ref 3.5–5.1)
PROT SERPL-MCNC: 6.6 G/DL (ref 6.3–8.2)
RBC # BLD AUTO: 3.15 M/UL (ref 4.23–5.6)
RBC MORPH BLD: ABNORMAL
SODIUM SERPL-SCNC: 138 MMOL/L (ref 136–145)
WBC # BLD AUTO: 6.6 K/UL (ref 4.3–11.1)
WBC MORPH BLD: ABNORMAL

## 2021-02-01 PROCEDURE — 85025 COMPLETE CBC W/AUTO DIFF WBC: CPT

## 2021-02-01 PROCEDURE — 36415 COLL VENOUS BLD VENIPUNCTURE: CPT

## 2021-02-01 PROCEDURE — 80053 COMPREHEN METABOLIC PANEL: CPT

## 2021-02-06 LAB
ALBUMIN SERPL-MCNC: 2.4 G/DL (ref 3.2–4.6)
ALBUMIN/GLOB SERPL: 0.6 {RATIO} (ref 1.2–3.5)
ALP SERPL-CCNC: 89 U/L (ref 50–136)
ALT SERPL-CCNC: 69 U/L (ref 12–65)
ANION GAP SERPL CALC-SCNC: 4 MMOL/L (ref 7–16)
AST SERPL-CCNC: 31 U/L (ref 15–37)
BASOPHILS # BLD: 0.1 K/UL (ref 0–0.2)
BASOPHILS NFR BLD: 1 % (ref 0–2)
BILIRUB SERPL-MCNC: 0.5 MG/DL (ref 0.2–1.1)
BUN SERPL-MCNC: 13 MG/DL (ref 8–23)
CALCIUM SERPL-MCNC: 8.9 MG/DL (ref 8.3–10.4)
CHLORIDE SERPL-SCNC: 99 MMOL/L (ref 98–107)
CO2 SERPL-SCNC: 32 MMOL/L (ref 21–32)
CREAT SERPL-MCNC: 1.08 MG/DL (ref 0.8–1.5)
DIFFERENTIAL METHOD BLD: ABNORMAL
EOSINOPHIL # BLD: 0.1 K/UL (ref 0–0.8)
EOSINOPHIL NFR BLD: 1 % (ref 0.5–7.8)
ERYTHROCYTE [DISTWIDTH] IN BLOOD BY AUTOMATED COUNT: 15.8 % (ref 11.9–14.6)
GLOBULIN SER CALC-MCNC: 4.2 G/DL (ref 2.3–3.5)
GLUCOSE SERPL-MCNC: 144 MG/DL (ref 65–100)
HCT VFR BLD AUTO: 29.6 % (ref 41.1–50.3)
HGB BLD-MCNC: 9.4 G/DL (ref 13.6–17.2)
IMM GRANULOCYTES # BLD AUTO: 0.1 K/UL (ref 0–0.5)
IMM GRANULOCYTES NFR BLD AUTO: 1 % (ref 0–5)
LYMPHOCYTES # BLD: 1.5 K/UL (ref 0.5–4.6)
LYMPHOCYTES NFR BLD: 16 % (ref 13–44)
MCH RBC QN AUTO: 31.1 PG (ref 26.1–32.9)
MCHC RBC AUTO-ENTMCNC: 31.8 G/DL (ref 31.4–35)
MCV RBC AUTO: 98 FL (ref 79.6–97.8)
MONOCYTES # BLD: 1.1 K/UL (ref 0.1–1.3)
MONOCYTES NFR BLD: 11 % (ref 4–12)
NEUTS SEG # BLD: 6.7 K/UL (ref 1.7–8.2)
NEUTS SEG NFR BLD: 71 % (ref 43–78)
NRBC # BLD: 0 K/UL (ref 0–0.2)
PLATELET # BLD AUTO: 248 K/UL (ref 150–450)
PMV BLD AUTO: 9.9 FL (ref 9.4–12.3)
POTASSIUM SERPL-SCNC: 4.5 MMOL/L (ref 3.5–5.1)
PROT SERPL-MCNC: 6.6 G/DL (ref 6.3–8.2)
RBC # BLD AUTO: 3.02 M/UL (ref 4.23–5.6)
SODIUM SERPL-SCNC: 135 MMOL/L (ref 136–145)
WBC # BLD AUTO: 9.5 K/UL (ref 4.3–11.1)

## 2021-02-06 PROCEDURE — 36415 COLL VENOUS BLD VENIPUNCTURE: CPT

## 2021-02-06 PROCEDURE — 80053 COMPREHEN METABOLIC PANEL: CPT

## 2021-02-06 PROCEDURE — 85025 COMPLETE CBC W/AUTO DIFF WBC: CPT

## 2021-02-07 LAB
APPEARANCE UR: ABNORMAL
BACTERIA URNS QL MICRO: ABNORMAL /HPF
BILIRUB UR QL: NEGATIVE
CASTS URNS QL MICRO: ABNORMAL /LPF
COLOR UR: YELLOW
EPI CELLS #/AREA URNS HPF: ABNORMAL /HPF
GLUCOSE UR STRIP.AUTO-MCNC: NEGATIVE MG/DL
HGB UR QL STRIP: ABNORMAL
KETONES UR QL STRIP.AUTO: NEGATIVE MG/DL
LEUKOCYTE ESTERASE UR QL STRIP.AUTO: ABNORMAL
NITRITE UR QL STRIP.AUTO: NEGATIVE
OTHER OBSERVATIONS,UCOM: ABNORMAL
PH UR STRIP: 8 [PH] (ref 5–9)
PROT UR STRIP-MCNC: 100 MG/DL
RBC #/AREA URNS HPF: ABNORMAL /HPF
SP GR UR REFRACTOMETRY: 1.01 (ref 1–1.02)
UROBILINOGEN UR QL STRIP.AUTO: 1 EU/DL (ref 0.2–1)
WBC URNS QL MICRO: >100 /HPF

## 2021-02-07 PROCEDURE — 81001 URINALYSIS AUTO W/SCOPE: CPT

## 2021-02-08 LAB — MAGNESIUM SERPL-MCNC: 2.3 MG/DL (ref 1.8–2.4)

## 2021-02-08 PROCEDURE — 83735 ASSAY OF MAGNESIUM: CPT

## 2021-02-08 PROCEDURE — 36415 COLL VENOUS BLD VENIPUNCTURE: CPT

## 2021-03-02 PROBLEM — I49.9 IRREGULAR HEART BEAT: Status: ACTIVE | Noted: 2021-03-02

## 2021-03-02 PROBLEM — I49.3 PVC'S (PREMATURE VENTRICULAR CONTRACTIONS): Status: ACTIVE | Noted: 2021-03-02

## 2021-05-11 ENCOUNTER — HOSPITAL ENCOUNTER (INPATIENT)
Age: 76
LOS: 2 days | Discharge: HOME | DRG: 871 | End: 2021-05-14
Attending: STUDENT IN AN ORGANIZED HEALTH CARE EDUCATION/TRAINING PROGRAM | Admitting: HOSPITALIST
Payer: MEDICARE

## 2021-05-11 ENCOUNTER — APPOINTMENT (OUTPATIENT)
Dept: GENERAL RADIOLOGY | Age: 76
DRG: 871 | End: 2021-05-11
Attending: EMERGENCY MEDICINE
Payer: MEDICARE

## 2021-05-11 ENCOUNTER — APPOINTMENT (OUTPATIENT)
Dept: CT IMAGING | Age: 76
DRG: 871 | End: 2021-05-11
Attending: STUDENT IN AN ORGANIZED HEALTH CARE EDUCATION/TRAINING PROGRAM
Payer: MEDICARE

## 2021-05-11 DIAGNOSIS — R09.02 HYPOXIA: Primary | ICD-10-CM

## 2021-05-11 DIAGNOSIS — J18.9 PNEUMONIA OF BOTH LOWER LOBES DUE TO INFECTIOUS ORGANISM: ICD-10-CM

## 2021-05-11 LAB
ALBUMIN SERPL-MCNC: 3.7 G/DL (ref 3.2–4.6)
ALBUMIN/GLOB SERPL: 1 {RATIO} (ref 1.2–3.5)
ALP SERPL-CCNC: 57 U/L (ref 50–136)
ALT SERPL-CCNC: 24 U/L (ref 12–65)
ANION GAP SERPL CALC-SCNC: 7 MMOL/L (ref 7–16)
ARTERIAL PATENCY WRIST A: POSITIVE
AST SERPL-CCNC: 20 U/L (ref 15–37)
BASE DEFICIT BLD-SCNC: 0.5 MMOL/L
BASOPHILS # BLD: 0 K/UL (ref 0–0.2)
BASOPHILS NFR BLD: 0 % (ref 0–2)
BDY SITE: ABNORMAL
BILIRUB SERPL-MCNC: 0.5 MG/DL (ref 0.2–1.1)
BNP SERPL-MCNC: 205 PG/ML
BUN SERPL-MCNC: 12 MG/DL (ref 8–23)
CALCIUM SERPL-MCNC: 8.9 MG/DL (ref 8.3–10.4)
CHLORIDE SERPL-SCNC: 105 MMOL/L (ref 98–107)
CO2 SERPL-SCNC: 25 MMOL/L (ref 21–32)
CREAT SERPL-MCNC: 1.09 MG/DL (ref 0.8–1.5)
DIFFERENTIAL METHOD BLD: ABNORMAL
EOSINOPHIL # BLD: 0 K/UL (ref 0–0.8)
EOSINOPHIL NFR BLD: 0 % (ref 0.5–7.8)
ERYTHROCYTE [DISTWIDTH] IN BLOOD BY AUTOMATED COUNT: 16 % (ref 11.9–14.6)
GAS FLOW.O2 O2 DELIVERY SYS: ABNORMAL L/MIN
GLOBULIN SER CALC-MCNC: 3.6 G/DL (ref 2.3–3.5)
GLUCOSE SERPL-MCNC: 192 MG/DL (ref 65–100)
HCO3 BLD-SCNC: 23.5 MMOL/L (ref 22–26)
HCT VFR BLD AUTO: 35 % (ref 41.1–50.3)
HGB BLD-MCNC: 11.6 G/DL (ref 13.6–17.2)
IMM GRANULOCYTES # BLD AUTO: 0.1 K/UL (ref 0–0.5)
IMM GRANULOCYTES NFR BLD AUTO: 1 % (ref 0–5)
LACTATE SERPL-SCNC: 1.5 MMOL/L (ref 0.4–2)
LYMPHOCYTES # BLD: 0.7 K/UL (ref 0.5–4.6)
LYMPHOCYTES NFR BLD: 5 % (ref 13–44)
MCH RBC QN AUTO: 31.4 PG (ref 26.1–32.9)
MCHC RBC AUTO-ENTMCNC: 33.1 G/DL (ref 31.4–35)
MCV RBC AUTO: 94.6 FL (ref 79.6–97.8)
MONOCYTES # BLD: 1.2 K/UL (ref 0.1–1.3)
MONOCYTES NFR BLD: 9 % (ref 4–12)
NEUTS SEG # BLD: 12.1 K/UL (ref 1.7–8.2)
NEUTS SEG NFR BLD: 85 % (ref 43–78)
NRBC # BLD: 0 K/UL (ref 0–0.2)
PCO2 BLD: 35.7 MMHG (ref 35–45)
PH BLD: 7.43 [PH] (ref 7.35–7.45)
PLATELET # BLD AUTO: 218 K/UL (ref 150–450)
PMV BLD AUTO: 10.5 FL (ref 9.4–12.3)
PO2 BLD: 51 MMHG (ref 75–100)
POTASSIUM SERPL-SCNC: 4.7 MMOL/L (ref 3.5–5.1)
PROCALCITONIN SERPL-MCNC: <0.05 NG/ML
PROT SERPL-MCNC: 7.3 G/DL (ref 6.3–8.2)
RBC # BLD AUTO: 3.7 M/UL (ref 4.23–5.6)
SAO2 % BLD: 86.5 % (ref 95–98)
SERVICE CMNT-IMP: ABNORMAL
SODIUM SERPL-SCNC: 137 MMOL/L (ref 136–145)
SPECIMEN TYPE: ABNORMAL
TROPONIN-HIGH SENSITIVITY: 10.3 PG/ML (ref 0–14)
TROPONIN-HIGH SENSITIVITY: 13 PG/ML (ref 0–14)
WBC # BLD AUTO: 14.2 K/UL (ref 4.3–11.1)

## 2021-05-11 PROCEDURE — 96365 THER/PROPH/DIAG IV INF INIT: CPT

## 2021-05-11 PROCEDURE — 74011250637 HC RX REV CODE- 250/637: Performed by: STUDENT IN AN ORGANIZED HEALTH CARE EDUCATION/TRAINING PROGRAM

## 2021-05-11 PROCEDURE — 96375 TX/PRO/DX INJ NEW DRUG ADDON: CPT

## 2021-05-11 PROCEDURE — 87040 BLOOD CULTURE FOR BACTERIA: CPT

## 2021-05-11 PROCEDURE — 94761 N-INVAS EAR/PLS OXIMETRY MLT: CPT

## 2021-05-11 PROCEDURE — 83605 ASSAY OF LACTIC ACID: CPT

## 2021-05-11 PROCEDURE — 93005 ELECTROCARDIOGRAM TRACING: CPT | Performed by: EMERGENCY MEDICINE

## 2021-05-11 PROCEDURE — 84145 PROCALCITONIN (PCT): CPT

## 2021-05-11 PROCEDURE — 84484 ASSAY OF TROPONIN QUANT: CPT

## 2021-05-11 PROCEDURE — 82803 BLOOD GASES ANY COMBINATION: CPT

## 2021-05-11 PROCEDURE — 99285 EMERGENCY DEPT VISIT HI MDM: CPT

## 2021-05-11 PROCEDURE — 83880 ASSAY OF NATRIURETIC PEPTIDE: CPT

## 2021-05-11 PROCEDURE — 85025 COMPLETE CBC W/AUTO DIFF WBC: CPT

## 2021-05-11 PROCEDURE — 81003 URINALYSIS AUTO W/O SCOPE: CPT

## 2021-05-11 PROCEDURE — 74011250636 HC RX REV CODE- 250/636: Performed by: STUDENT IN AN ORGANIZED HEALTH CARE EDUCATION/TRAINING PROGRAM

## 2021-05-11 PROCEDURE — 36600 WITHDRAWAL OF ARTERIAL BLOOD: CPT

## 2021-05-11 PROCEDURE — 80053 COMPREHEN METABOLIC PANEL: CPT

## 2021-05-11 PROCEDURE — 74011000258 HC RX REV CODE- 258: Performed by: STUDENT IN AN ORGANIZED HEALTH CARE EDUCATION/TRAINING PROGRAM

## 2021-05-11 PROCEDURE — 71045 X-RAY EXAM CHEST 1 VIEW: CPT

## 2021-05-11 PROCEDURE — 70450 CT HEAD/BRAIN W/O DYE: CPT

## 2021-05-11 RX ORDER — DIPHENHYDRAMINE HYDROCHLORIDE 50 MG/ML
25 INJECTION, SOLUTION INTRAMUSCULAR; INTRAVENOUS
Status: COMPLETED | OUTPATIENT
Start: 2021-05-11 | End: 2021-05-11

## 2021-05-11 RX ORDER — ACETAMINOPHEN 500 MG
1000 TABLET ORAL
Status: COMPLETED | OUTPATIENT
Start: 2021-05-11 | End: 2021-05-11

## 2021-05-11 RX ORDER — IBUPROFEN 800 MG/1
800 TABLET ORAL
Status: COMPLETED | OUTPATIENT
Start: 2021-05-11 | End: 2021-05-11

## 2021-05-11 RX ADMIN — METHYLPREDNISOLONE SODIUM SUCCINATE 125 MG: 125 INJECTION, POWDER, FOR SOLUTION INTRAMUSCULAR; INTRAVENOUS at 22:26

## 2021-05-11 RX ADMIN — IBUPROFEN 800 MG: 800 TABLET, FILM COATED ORAL at 22:19

## 2021-05-11 RX ADMIN — SODIUM CHLORIDE 1000 ML: 900 INJECTION, SOLUTION INTRAVENOUS at 20:10

## 2021-05-11 RX ADMIN — AZITHROMYCIN MONOHYDRATE 500 MG: 500 INJECTION, POWDER, LYOPHILIZED, FOR SOLUTION INTRAVENOUS at 21:02

## 2021-05-11 RX ADMIN — DIPHENHYDRAMINE HYDROCHLORIDE 25 MG: 50 INJECTION, SOLUTION INTRAMUSCULAR; INTRAVENOUS at 22:26

## 2021-05-11 RX ADMIN — ACETAMINOPHEN 1000 MG: 500 TABLET ORAL at 20:07

## 2021-05-11 RX ADMIN — CEFTRIAXONE 1 G: 1 INJECTION, POWDER, FOR SOLUTION INTRAMUSCULAR; INTRAVENOUS at 20:07

## 2021-05-11 NOTE — ED TRIAGE NOTES
Pt arrives via [facility] from home. Per EMS patient's wife left for the store and he was \"fine\" upon wife's return she felt as though he was altered. Pt complains of generalized weakness and a cough. Upon EMS arrival patient was found to be febrile at 103.4, hypoxic @ 88% RA and in bigeminy. EMS administered Lidocaine and applied oxygen 5LNC.

## 2021-05-12 PROBLEM — G93.41 ACUTE METABOLIC ENCEPHALOPATHY: Status: ACTIVE | Noted: 2021-05-12
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
## 2021-05-12 LAB
ANION GAP SERPL CALC-SCNC: 6 MMOL/L (ref 7–16)
APPEARANCE UR: ABNORMAL
ATRIAL RATE: 110 BPM
BACTERIA SPEC CULT: NORMAL
BACTERIA URNS QL MICRO: 0 /HPF
BASOPHILS # BLD: 0 K/UL (ref 0–0.2)
BASOPHILS NFR BLD: 0 % (ref 0–2)
BILIRUB UR QL: NEGATIVE
BUN SERPL-MCNC: 15 MG/DL (ref 8–23)
CALCIUM SERPL-MCNC: 8.3 MG/DL (ref 8.3–10.4)
CALCULATED P AXIS, ECG09: 94 DEGREES
CALCULATED R AXIS, ECG10: 73 DEGREES
CALCULATED T AXIS, ECG11: 53 DEGREES
CASTS URNS QL MICRO: 0 /LPF
CHLORIDE SERPL-SCNC: 106 MMOL/L (ref 98–107)
CO2 SERPL-SCNC: 27 MMOL/L (ref 21–32)
COLOR UR: YELLOW
CREAT SERPL-MCNC: 1.09 MG/DL (ref 0.8–1.5)
D DIMER PPP FEU-MCNC: 0.78 UG/ML(FEU)
DIAGNOSIS, 93000: NORMAL
DIFFERENTIAL METHOD BLD: ABNORMAL
EOSINOPHIL # BLD: 0 K/UL (ref 0–0.8)
EOSINOPHIL NFR BLD: 0 % (ref 0.5–7.8)
EPI CELLS #/AREA URNS HPF: 0 /HPF
ERYTHROCYTE [DISTWIDTH] IN BLOOD BY AUTOMATED COUNT: 15.8 % (ref 11.9–14.6)
GLUCOSE BLD STRIP.AUTO-MCNC: 169 MG/DL (ref 65–100)
GLUCOSE BLD STRIP.AUTO-MCNC: 187 MG/DL (ref 65–100)
GLUCOSE BLD STRIP.AUTO-MCNC: 187 MG/DL (ref 65–100)
GLUCOSE BLD STRIP.AUTO-MCNC: 214 MG/DL (ref 65–100)
GLUCOSE BLD STRIP.AUTO-MCNC: 215 MG/DL (ref 65–100)
GLUCOSE SERPL-MCNC: 230 MG/DL (ref 65–100)
GLUCOSE UR STRIP.AUTO-MCNC: NEGATIVE MG/DL
HCT VFR BLD AUTO: 34.6 % (ref 41.1–50.3)
HGB BLD-MCNC: 11.2 G/DL (ref 13.6–17.2)
HGB UR QL STRIP: NEGATIVE
IMM GRANULOCYTES # BLD AUTO: 0.2 K/UL (ref 0–0.5)
IMM GRANULOCYTES NFR BLD AUTO: 1 % (ref 0–5)
KETONES UR QL STRIP.AUTO: ABNORMAL MG/DL
LEUKOCYTE ESTERASE UR QL STRIP.AUTO: NEGATIVE
LYMPHOCYTES # BLD: 0.9 K/UL (ref 0.5–4.6)
LYMPHOCYTES NFR BLD: 4 % (ref 13–44)
MCH RBC QN AUTO: 31.4 PG (ref 26.1–32.9)
MCHC RBC AUTO-ENTMCNC: 32.4 G/DL (ref 31.4–35)
MCV RBC AUTO: 96.9 FL (ref 79.6–97.8)
MONOCYTES # BLD: 1.4 K/UL (ref 0.1–1.3)
MONOCYTES NFR BLD: 6 % (ref 4–12)
NEUTS SEG # BLD: 20.7 K/UL (ref 1.7–8.2)
NEUTS SEG NFR BLD: 89 % (ref 43–78)
NITRITE UR QL STRIP.AUTO: NEGATIVE
NRBC # BLD: 0 K/UL (ref 0–0.2)
P-R INTERVAL, ECG05: 174 MS
PH UR STRIP: 5 [PH] (ref 5–9)
PLATELET # BLD AUTO: 183 K/UL (ref 150–450)
PLATELET COMMENTS,PCOM: ADEQUATE
PMV BLD AUTO: 10.4 FL (ref 9.4–12.3)
POTASSIUM SERPL-SCNC: 4.4 MMOL/L (ref 3.5–5.1)
PROT UR STRIP-MCNC: ABNORMAL MG/DL
Q-T INTERVAL, ECG07: 316 MS
QRS DURATION, ECG06: 88 MS
QTC CALCULATION (BEZET), ECG08: 427 MS
RBC # BLD AUTO: 3.57 M/UL (ref 4.23–5.6)
RBC #/AREA URNS HPF: ABNORMAL /HPF
RBC MORPH BLD: ABNORMAL
SERVICE CMNT-IMP: ABNORMAL
SERVICE CMNT-IMP: NORMAL
SODIUM SERPL-SCNC: 139 MMOL/L (ref 138–145)
SP GR UR REFRACTOMETRY: 1.02 (ref 1–1.02)
UROBILINOGEN UR QL STRIP.AUTO: 0.2 EU/DL (ref 0.2–1)
VENTRICULAR RATE, ECG03: 110 BPM
WBC # BLD AUTO: 23.2 K/UL (ref 4.3–11.1)
WBC MORPH BLD: ABNORMAL
WBC URNS QL MICRO: ABNORMAL /HPF

## 2021-05-12 PROCEDURE — 94664 DEMO&/EVAL PT USE INHALER: CPT

## 2021-05-12 PROCEDURE — 87641 MR-STAPH DNA AMP PROBE: CPT

## 2021-05-12 PROCEDURE — 2709999900 HC NON-CHARGEABLE SUPPLY

## 2021-05-12 PROCEDURE — 82962 GLUCOSE BLOOD TEST: CPT

## 2021-05-12 PROCEDURE — 65270000029 HC RM PRIVATE

## 2021-05-12 PROCEDURE — 94640 AIRWAY INHALATION TREATMENT: CPT

## 2021-05-12 PROCEDURE — 74011250637 HC RX REV CODE- 250/637: Performed by: HOSPITALIST

## 2021-05-12 PROCEDURE — 85025 COMPLETE CBC W/AUTO DIFF WBC: CPT

## 2021-05-12 PROCEDURE — 94760 N-INVAS EAR/PLS OXIMETRY 1: CPT

## 2021-05-12 PROCEDURE — 74011000258 HC RX REV CODE- 258: Performed by: HOSPITALIST

## 2021-05-12 PROCEDURE — 74011250636 HC RX REV CODE- 250/636: Performed by: FAMILY MEDICINE

## 2021-05-12 PROCEDURE — 74011636637 HC RX REV CODE- 636/637: Performed by: HOSPITALIST

## 2021-05-12 PROCEDURE — 85379 FIBRIN DEGRADATION QUANT: CPT

## 2021-05-12 PROCEDURE — 74011250636 HC RX REV CODE- 250/636: Performed by: HOSPITALIST

## 2021-05-12 PROCEDURE — 74011250637 HC RX REV CODE- 250/637: Performed by: FAMILY MEDICINE

## 2021-05-12 PROCEDURE — 81003 URINALYSIS AUTO W/O SCOPE: CPT

## 2021-05-12 PROCEDURE — 80048 BASIC METABOLIC PNL TOTAL CA: CPT

## 2021-05-12 RX ORDER — VANCOMYCIN HYDROCHLORIDE
1250 EVERY 12 HOURS
Status: DISCONTINUED | OUTPATIENT
Start: 2021-05-12 | End: 2021-05-12

## 2021-05-12 RX ORDER — VANCOMYCIN HYDROCHLORIDE 1 G/20ML
INJECTION, POWDER, LYOPHILIZED, FOR SOLUTION INTRAVENOUS EVERY 24 HOURS
Status: DISCONTINUED | OUTPATIENT
Start: 2021-05-12 | End: 2021-05-12 | Stop reason: SDUPTHER

## 2021-05-12 RX ORDER — TEMAZEPAM 15 MG/1
15 CAPSULE ORAL
Status: DISCONTINUED | OUTPATIENT
Start: 2021-05-12 | End: 2021-05-14 | Stop reason: HOSPADM

## 2021-05-12 RX ORDER — DOXYCYCLINE 100 MG/1
100 CAPSULE ORAL EVERY 12 HOURS
Status: DISCONTINUED | OUTPATIENT
Start: 2021-05-12 | End: 2021-05-14 | Stop reason: HOSPADM

## 2021-05-12 RX ORDER — ROSUVASTATIN CALCIUM 10 MG/1
10 TABLET, COATED ORAL
Status: DISCONTINUED | OUTPATIENT
Start: 2021-05-12 | End: 2021-05-14 | Stop reason: HOSPADM

## 2021-05-12 RX ORDER — BUDESONIDE AND FORMOTEROL FUMARATE DIHYDRATE 80; 4.5 UG/1; UG/1
2 AEROSOL RESPIRATORY (INHALATION)
Refills: 11 | Status: DISCONTINUED | OUTPATIENT
Start: 2021-05-12 | End: 2021-05-14 | Stop reason: HOSPADM

## 2021-05-12 RX ORDER — GUAIFENESIN 100 MG/5ML
81 LIQUID (ML) ORAL
Status: DISCONTINUED | OUTPATIENT
Start: 2021-05-12 | End: 2021-05-14 | Stop reason: HOSPADM

## 2021-05-12 RX ORDER — CLOPIDOGREL BISULFATE 75 MG/1
75 TABLET ORAL DAILY
Status: DISCONTINUED | OUTPATIENT
Start: 2021-05-12 | End: 2021-05-14 | Stop reason: HOSPADM

## 2021-05-12 RX ORDER — ALBUTEROL SULFATE 0.83 MG/ML
2.5 SOLUTION RESPIRATORY (INHALATION)
Status: DISCONTINUED | OUTPATIENT
Start: 2021-05-12 | End: 2021-05-14 | Stop reason: HOSPADM

## 2021-05-12 RX ORDER — LANOLIN ALCOHOL/MO/W.PET/CERES
400 CREAM (GRAM) TOPICAL DAILY
Status: DISCONTINUED | OUTPATIENT
Start: 2021-05-12 | End: 2021-05-14 | Stop reason: HOSPADM

## 2021-05-12 RX ORDER — FUROSEMIDE 40 MG/1
20 TABLET ORAL DAILY
Status: DISCONTINUED | OUTPATIENT
Start: 2021-05-12 | End: 2021-05-12

## 2021-05-12 RX ORDER — FLUTICASONE PROPIONATE 50 MCG
2 SPRAY, SUSPENSION (ML) NASAL DAILY
Status: DISCONTINUED | OUTPATIENT
Start: 2021-05-12 | End: 2021-05-14 | Stop reason: HOSPADM

## 2021-05-12 RX ORDER — SODIUM CHLORIDE 0.9 % (FLUSH) 0.9 %
5-40 SYRINGE (ML) INJECTION AS NEEDED
Status: DISCONTINUED | OUTPATIENT
Start: 2021-05-12 | End: 2021-05-14 | Stop reason: HOSPADM

## 2021-05-12 RX ORDER — SAME BUTANEDISULFONATE/BETAINE 400-600 MG
250 POWDER IN PACKET (EA) ORAL 2 TIMES DAILY
Status: DISCONTINUED | OUTPATIENT
Start: 2021-05-12 | End: 2021-05-14 | Stop reason: HOSPADM

## 2021-05-12 RX ORDER — GLIPIZIDE 5 MG/1
10 TABLET ORAL
Status: DISCONTINUED | OUTPATIENT
Start: 2021-05-12 | End: 2021-05-12

## 2021-05-12 RX ORDER — MONTELUKAST SODIUM 10 MG/1
10 TABLET ORAL
Status: DISCONTINUED | OUTPATIENT
Start: 2021-05-12 | End: 2021-05-14 | Stop reason: HOSPADM

## 2021-05-12 RX ORDER — ACETAMINOPHEN 325 MG/1
650 TABLET ORAL
Status: DISCONTINUED | OUTPATIENT
Start: 2021-05-12 | End: 2021-05-14 | Stop reason: HOSPADM

## 2021-05-12 RX ORDER — INSULIN LISPRO 100 [IU]/ML
INJECTION, SOLUTION INTRAVENOUS; SUBCUTANEOUS
Status: DISCONTINUED | OUTPATIENT
Start: 2021-05-12 | End: 2021-05-14 | Stop reason: HOSPADM

## 2021-05-12 RX ORDER — INSULIN LISPRO 100 [IU]/ML
INJECTION, SOLUTION INTRAVENOUS; SUBCUTANEOUS
Status: DISCONTINUED | OUTPATIENT
Start: 2021-05-12 | End: 2021-05-12 | Stop reason: SDUPTHER

## 2021-05-12 RX ORDER — ENOXAPARIN SODIUM 100 MG/ML
40 INJECTION SUBCUTANEOUS EVERY 24 HOURS
Status: DISCONTINUED | OUTPATIENT
Start: 2021-05-12 | End: 2021-05-12

## 2021-05-12 RX ORDER — MAG HYDROX/ALUMINUM HYD/SIMETH 200-200-20
30 SUSPENSION, ORAL (FINAL DOSE FORM) ORAL
Status: DISCONTINUED | OUTPATIENT
Start: 2021-05-12 | End: 2021-05-14 | Stop reason: HOSPADM

## 2021-05-12 RX ORDER — SODIUM CHLORIDE 0.9 % (FLUSH) 0.9 %
5-40 SYRINGE (ML) INJECTION EVERY 8 HOURS
Status: DISCONTINUED | OUTPATIENT
Start: 2021-05-12 | End: 2021-05-14 | Stop reason: HOSPADM

## 2021-05-12 RX ORDER — GUAIFENESIN 600 MG/1
1200 TABLET, EXTENDED RELEASE ORAL
Status: DISCONTINUED | OUTPATIENT
Start: 2021-05-12 | End: 2021-05-14 | Stop reason: HOSPADM

## 2021-05-12 RX ORDER — SODIUM CHLORIDE, SODIUM LACTATE, POTASSIUM CHLORIDE, CALCIUM CHLORIDE 600; 310; 30; 20 MG/100ML; MG/100ML; MG/100ML; MG/100ML
75 INJECTION, SOLUTION INTRAVENOUS CONTINUOUS
Status: DISPENSED | OUTPATIENT
Start: 2021-05-12 | End: 2021-05-12

## 2021-05-12 RX ADMIN — DOXYCYCLINE HYCLATE 100 MG: 100 CAPSULE ORAL at 21:52

## 2021-05-12 RX ADMIN — VANCOMYCIN HYDROCHLORIDE 2500 MG: 10 INJECTION, POWDER, LYOPHILIZED, FOR SOLUTION INTRAVENOUS at 04:13

## 2021-05-12 RX ADMIN — ASPIRIN 81 MG: 81 TABLET, CHEWABLE ORAL at 21:52

## 2021-05-12 RX ADMIN — ROSUVASTATIN 10 MG: 10 TABLET, FILM COATED ORAL at 21:52

## 2021-05-12 RX ADMIN — MAGNESIUM GLUCONATE 500 MG ORAL TABLET 400 MG: 500 TABLET ORAL at 09:18

## 2021-05-12 RX ADMIN — CEFEPIME 1 G: 1 INJECTION, POWDER, FOR SOLUTION INTRAMUSCULAR; INTRAVENOUS at 13:56

## 2021-05-12 RX ADMIN — VANCOMYCIN HYDROCHLORIDE 1250 MG: 10 INJECTION, POWDER, LYOPHILIZED, FOR SOLUTION INTRAVENOUS at 16:31

## 2021-05-12 RX ADMIN — INSULIN LISPRO 3 UNITS: 100 INJECTION, SOLUTION INTRAVENOUS; SUBCUTANEOUS at 22:05

## 2021-05-12 RX ADMIN — SODIUM CHLORIDE 500 ML: 900 INJECTION, SOLUTION INTRAVENOUS at 03:59

## 2021-05-12 RX ADMIN — INSULIN LISPRO 3 UNITS: 100 INJECTION, SOLUTION INTRAVENOUS; SUBCUTANEOUS at 16:31

## 2021-05-12 RX ADMIN — INSULIN LISPRO 6 UNITS: 100 INJECTION, SOLUTION INTRAVENOUS; SUBCUTANEOUS at 08:33

## 2021-05-12 RX ADMIN — BUDESONIDE AND FORMOTEROL FUMARATE DIHYDRATE 2 PUFF: 80; 4.5 AEROSOL RESPIRATORY (INHALATION) at 20:02

## 2021-05-12 RX ADMIN — SODIUM CHLORIDE, SODIUM LACTATE, POTASSIUM CHLORIDE, AND CALCIUM CHLORIDE 75 ML/HR: 600; 310; 30; 20 INJECTION, SOLUTION INTRAVENOUS at 08:21

## 2021-05-12 RX ADMIN — FLUTICASONE PROPIONATE 2 SPRAY: 50 SPRAY, METERED NASAL at 09:27

## 2021-05-12 RX ADMIN — INSULIN LISPRO 3 UNITS: 100 INJECTION, SOLUTION INTRAVENOUS; SUBCUTANEOUS at 11:30

## 2021-05-12 RX ADMIN — Medication 5 ML: at 01:54

## 2021-05-12 RX ADMIN — CLOPIDOGREL BISULFATE 75 MG: 75 TABLET ORAL at 09:17

## 2021-05-12 RX ADMIN — CEFEPIME 1 G: 1 INJECTION, POWDER, FOR SOLUTION INTRAMUSCULAR; INTRAVENOUS at 02:14

## 2021-05-12 RX ADMIN — RDII 250 MG CAPSULE 250 MG: at 21:52

## 2021-05-12 RX ADMIN — BUDESONIDE AND FORMOTEROL FUMARATE DIHYDRATE 2 PUFF: 80; 4.5 AEROSOL RESPIRATORY (INHALATION) at 09:05

## 2021-05-12 RX ADMIN — DOXYCYCLINE HYCLATE 100 MG: 100 CAPSULE ORAL at 09:06

## 2021-05-12 RX ADMIN — Medication 5 ML: at 13:52

## 2021-05-12 RX ADMIN — Medication 10 ML: at 21:54

## 2021-05-12 RX ADMIN — MONTELUKAST 10 MG: 10 TABLET, FILM COATED ORAL at 21:52

## 2021-05-12 NOTE — H&P
[facility] History and Physical       Name:  Zena Banda  Age:76 y.o. Sex:male   :  1945    MRN:  [id number]   PCP:  Conchita Becerril MD      Admit Date:  2021  7:39 PM   Chief Complaint: Pt arrives via [facility] from home. Per EMS patient's wife left for the store and he was \"fine\" upon wife's return she felt as though he was altered. Pt complains of generalized weakness and a cough. Upon EMS arrival patient was found to be febrile at 103.4, hypoxic @ 88% RA and in bigeminy. EMS administered Lidocaine and applied oxygen 5LNC. Reason for Admission:     Pneumonia [J18.9]  66 yo WM, h/o COPD, CAD, DM, recent prolong COVID illness with month long hospitalization followed by Rehab at [facility] in 2021, has been recovering and doing well at home, until this evening. Now with fever, bilateral infiltrates, and leukocytosis    Assessment & Plan: Active Problems:    HTN (hypertension) (10/12/2011)          Morbid obesity ([address] (10/12/2011)          Pulmonary emphysema ([facility] (2016)          Type 2 diabetes with nephropathy ([facility] (2019)          Pneumonia (2020)            PLAN:  1) Admit Inpatient medicine, remote tele  2) IV abx and supportive care: supplemental oxygen anti-tussivees  3) resume home meds  4) DVT prophy  5) Full code  6) plan of care discussed with wife who was present in ER       Disposition/Expected LOS: 3d  Diet: DIET DIABETIC CONSISTENT CARB  VTE ppx: lovenox  GI ppx:   Code status: Full Code  Surrogate decision-maker: spouse      History of Presenting Illness:     Zena Banda is a 67 y.o. male with medical history of  history of COPD (not on oxygen at home) presents to the emergency department with reports of altered mental status, fever and concern for sepsis.   Patient states he began feeling poorly this morning at approximately 6 AM.  This progressed throughout the day, his wife was at bedside states he was acting normally when she left around to run errands in the AM.  When she returned, patient was acting oddly and confused. EMS was called, fever of 103 per report. He was also tachycardic with multifocal PVCs consistent with bigeminy. Patient was given a lidocaine push IV and started on a drip for this issue. Sepsis protocol was initiated however given the lack of source, patient received no medication for sepsis. Sats durations of 88% per EMS providers. Of note, patient was diagnosed with COVID-19 earlier this year and required extended hospital and rehab stay lasting approximate 40 days. Patient has been at home and feeling well up until today. He has been vaccinated with both vaccines, completed his course last month. In ER he had CXR with subtle bilateral infiltrates, WBC elevated at 14K, but lactic acid 1.5 and procal <0.05. He subsequently received IV abx and IVF in ER. Hospitalist asked to admit    Review of Systems:  A 14 point review of systems was taken and pertinent positive as per HPI.         Past Medical History:   Diagnosis Date    AGUSTIN (acute kidney injury) ([facility] 9/15/2014    Allergic rhinitis, CAUSE UNSPECIFIED 11/10/2015    Asthma; SEASONAL 11/10/2015    Benign essential hypertension 11/10/2015    Benign neoplasm of colon 11/10/2015    CAD (coronary artery disease) 1998, 1999    mix2, 3 stents [id number]    CAD (coronary artery disease) 11/10/2015    Cardiomyopathy ([address] 08/10/8589    Complicated wound infection 11/10/2015    COPD /OTHER 11/10/2015    COPD exacerbation ([address] 10/12/2011    Diabetes mellitus type 2, controlled ([address] 11/10/2015    Diabetic neuropathy  11/10/2015    Disruption of wound, unspecified 10/9/2014    Encounter for long-term (current) use of other high-risk medications 11/10/2015    Extremity atherosclerosis with intermittent claudication ([address] 11/10/2015    H/O endarterectomy; 9/2014 11/10/2015    Hiatal hernia 11/10/2015    Hyperglycemia  11/10/2015    Hyperlipidemia 11/10/2015  Myocardial infarction ([facility]     My[facility] infarction ([facility] () 11/10/2015    Obesity, UNSPECIFIED 11/10/2015    Orthostatic hypotension 11/10/2015    Osteoarthritis 11/10/2015    Peripheral vascular disease ([facility] 2014; S/P BILAT FEMORAL 11/10/2015    PVC (premature ventricular contraction)     Rash     Seroma complicating a procedure 10/2/2014    Sleep apnea     cpap    Uncontrolled type 2 diabetes mellitus ([facility] 11/10/2015    Vertiginous syndromes & LABYRINTHINE DISORDERS/UNSPEC 11/10/2015       Past Surgical History:   Procedure Laterality Date    HX CORONARY STENT PLACEMENT      HX CORONARY STENT PLACEMENT  2018    LCX OM1:2.5 x 12 Yoel MIRELA    HX HEART CATHETERIZATION  2018    LV EF=40%. LM:nl. LAD:30-40% mid stenosis. LCX OM1:95% stenosis. RCA:100% occlusion at RV branch.  DC ABDOMEN SURGERY PROC UNLISTED      abdominal cyst removed    DC CARDIAC SURG PROCEDURE UNLIST      stents x3    VASCULAR SURGERY PROCEDURE UNLIST  14    sally femoral endarterectomy    VASCULAR SURGERY PROCEDURE UNLIST  14    Repair of right common femoral artery        Family History : reviewed  Family History   Problem Relation Age of Onset    Stroke Mother     Hypertension Mother     Breast Cancer Mother     Heart Attack Father     Heart Disease Father     Other Father         DIVERTICULITIS    Lung Disease Brother         mesothioloma    Diabetes Sister     Crohn's Disease Sister         Social History     Tobacco Use    Smoking status: Former Smoker     Packs/day: 1.00     Years: 50.00     Pack years: 50.00     Quit date: 10/2/2011     Years since quittin.6    Smokeless tobacco: Current User     Types: Chew   Substance Use Topics    Alcohol use:  Yes     Alcohol/week: 0.0 standard drinks     Comment: rare       Allergies   Allergen Reactions    Daypro [Oxaprozin] Other (comments)     ELEVATED BLOOD PRESSURE       Immunization History   Administered Date(s) Administered    (RETIRED) Pneumococcal Vaccine (Unspecified Type) 10/17/2011    Influenza High Dose Vaccine PF 11/17/2016, 08/11/2017, 09/13/2018, 10/07/2019, 09/30/2020    Influenza Vaccine 09/30/2010, 09/01/2013, 09/26/2014, 11/02/2014, 10/12/2015    Influenza Vaccine Split 10/17/2011    Pneumococcal Conjugate (PCV-13) 04/10/2015    Pneumococcal Polysaccharide (PPSV-23) 11/27/2007, 10/17/2011    TB Skin Test (PPD) Intradermal 01/06/2021    Zoster Recombinant 09/06/2018, 11/27/2018         PTA Medications:  Current Outpatient Medications   Medication Instructions    amLODIPine-valsartan (EXFORGE)  mg per tablet TAKE 1/2 TO 1 TABLET BY MOUTH EVERY DAY    ascorbic acid (vitamin C) (VITAMIN C) 500 mg, Oral, EVERY BEDTIME    aspirin 81 mg, EVERY BEDTIME    cholecalciferol (vitamin D3) (VITAMIN D3) 2,000 Units, Oral, EVERY BEDTIME    clopidogreL (PLAVIX) 75 mg tab TAKE ONE TABLET BY MOUTH EVERY DAY    cpap machine kit Inhalation, \[facility] [place] [facility]\""    FISH OIL CONCENTRATE 1,000 mg cap 1-2 Caps, Oral, 2 TIMES DAILY, One tab in am & 2 tabs in pm    fluticasone propion-salmeteroL (ADVAIR/WIXELA) 250-50 mcg/dose diskus inhaler USE 1 PUFF TWICE DAILY; RINSE MOUTH AFTER EACH USE.     fluticasone propionate (FLONASE) 50 mcg/actuation nasal spray USE 1 OR 2 SPRAYS IN EACH NOSTRIL EVERY DAY    furosemide (LASIX) 40 mg, Oral, DAILY, Patient taking 1/2 daily    garlic 6,975 mg cap 1 Tab, Oral, 2 TIMES DAILY    glipiZIDE SR (GLUCOTROL XL) 10 mg CR tablet TAKE ONE TABLET BY MOUTH 2 TIMES A DAY    glucosamine/chondr funez A sod (GLUCOSAMINE-CHONDROITIN) 750-600 mg tab Oral, 2 TIMES DAILY    glucose blood VI test strips (ASCENSIA AUTODISC VI, ONE TOUCH ULTRA TEST VI) strip E11.9<BR>    guaiFENesin (MUCINEX) 1,200 mg, Oral, 2 TIMES DAILY AS NEEDED    Januvia 100 mg tablet TAKE ONE TABLET BY MOUTH EVERY DAY    magnesium oxide (MAG-OX) 400 mg, Oral, DAILY    metFORMIN (GLUCOPHAGE) 500 mg tablet TAKE TWO TABLETS BY MOUTH 2 TIMES A DAY    montelukast (SINGULAIR) 10 mg tablet TAKE 1 TABLET BY MOUTH EVERY DAY AT BEDTIME    MULTIVITS-MINERALS/FA/LYCOPENE (ONE-A-DAY MEN'S MULTIVITAMIN PO) 1 Tab, Oral, DAILY    nitroglycerin (NITROSTAT) 0.4 mg, SubLINGual, EVERY 5 MIN AS NEEDED    nystatin (MYCOSTATIN) 100,000 unit/gram ointment Topical, 2 TIMES DAILY    ProAir HFA 90 mcg/actuation inhaler 2 Puffs, Inhalation, 4 TIMES DAILY AS NEEDED    rosuvastatin (CRESTOR) 10 mg tablet TAKE ONE TABLET BY MOUTH ONCE A DAY       Objective:     Patient Vitals for the past 24 hrs:   Temp Pulse Resp BP SpO2   05/12/21 0220 -- 77 20 115/63 95 %   05/12/21 0200 -- 85 20 119/60 95 %   05/12/21 0140 -- 93 18 117/61 92 %   05/12/21 0120 -- 88 21 111/62 94 %   05/12/21 0111 -- 99 22 105/61 94 %   05/11/21 2359 -- 87 -- (!) 128/58 --   05/11/21 2339 -- 96 -- 120/63 --   05/11/21 2320 -- 93 18 119/65 --   05/11/21 2300 -- (!) 105 -- (!) 111/54 95 %   05/11/21 2239 -- 94 23 130/69 92 %   05/11/21 2220 -- (!) 103 22 (!) 112/58 90 %   05/11/21 2200 -- (!) 108 -- 102/61 92 %   05/11/21 2145 (!) 102.2 °F (39 °C) -- -- -- --   05/11/21 2139 -- (!) 109 23 (!) 114/55 93 %   05/11/21 2119 -- (!) 105 20 (!) 110/58 93 %   05/11/21 2107 -- (!) 101 26 (!) 114/59 94 %   05/11/21 2015 -- (!) 116 19 127/60 95 %   05/11/21 1959 -- (!) 113 16 130/68 92 %   05/11/21 1949 -- (!) 110 -- (!) 150/65 94 %   05/11/21 1947 99.8 °F (37.7 °C) (!) 111 21 (!) 147/73 (!) 88 %   05/11/21 1946 -- (!) 114 -- (!) 147/73 93 %       Oxygen Therapy  O2 Sat (%): 95 % (05/12/21 0220)  Pulse via Oximetry: 80 beats per minute (05/12/21 0220)  O2 Device: None (Room air) (05/11/21 1947)  O2 Flow Rate (L/min): 3 l/min (05/11/21 1945)    Body mass index is 34.7 kg/m².     Physical Exam:    General:  Alert and oriented x 3, sleepy, No acute distress, speaking in full sentences, obese  HEENT:  Pupils equal and reactive to light and accommodation, oropharynx is clear   Neck:   Supple, no lymphadenopathy, no JVD   Lungs:  Coarse Breath sounds bilaterally   CV:   tachy, regular rhythm, with normal S1 and S2   Abdomen:  Soft, nontender, nondistended, normoactive bowel sounds   Extremities:  No cyanosis clubbing or edema   Neuro:  Nonfocal, A&O x3   Psych:  Normal mood and affect       Data Reviewed: I have reviewed all labs, meds, and studies. Recent Results (from the past 24 hour(s))   EKG, 12 LEAD, INITIAL    Collection Time: 05/11/21  7:48 PM   Result Value Ref Range    Ventricular Rate 110 BPM    Atrial Rate 110 BPM    P-R Interval 174 ms    QRS Duration 88 ms    Q-T Interval 316 ms    QTC Calculation (Bezet) 427 ms    Calculated P Axis 94 degrees    Calculated R Axis 73 degrees    Calculated T Axis 53 degrees    Diagnosis       !! AGE AND GENDER SPECIFIC ECG ANALYSIS !! Sinus tachycardia with frequent and consecutive Premature ventricular   complexes and Fusion complexes  Abnormal ECG  When compared with ECG of 31-DEC-2020 01:24,  Fusion complexes are now Present  Premature supraventricular complexes are no longer Present  DE interval has decreased  QRS axis Shifted left     CBC WITH AUTOMATED DIFF    Collection Time: 05/11/21  7:59 PM   Result Value Ref Range    WBC 14.2 (H) 4.3 - 11.1 K/uL    RBC 3.70 (L) 4.23 - 5.6 M/uL    HGB 11.6 (L) 13.6 - 17.2 g/dL    HCT 35.0 (L) 41.1 - 50.3 %    MCV 94.6 79.6 - 97.8 FL    MCH 31.4 26.1 - 32.9 PG    MCHC 33.1 31.4 - 35.0 g/dL    RDW 16.0 (H) 11.9 - 14.6 %    PLATELET 371 362 - 143 K/uL    MPV 10.5 9.4 - 12.3 FL    ABSOLUTE NRBC 0.00 0.0 - 0.2 K/uL    DF AUTOMATED      NEUTROPHILS 85 (H) 43 - 78 %    LYMPHOCYTES 5 (L) 13 - 44 %    MONOCYTES 9 4.0 - 12.0 %    EOSINOPHILS 0 (L) 0.5 - 7.8 %    BASOPHILS 0 0.0 - 2.0 %    IMMATURE GRANULOCYTES 1 0.0 - 5.0 %    ABS. NEUTROPHILS 12.1 (H) 1.7 - 8.2 K/UL    ABS. LYMPHOCYTES 0.7 0.5 - 4.6 K/UL    ABS. MONOCYTES 1.2 0.1 - 1.3 K/UL    ABS. EOSINOPHILS 0.0 0.0 - 0.8 K/UL    ABS.  BASOPHILS 0.0 0.0 - 0.2 K/UL ABS. IMM. GRANS. 0.1 0.0 - 0.5 K/UL   METABOLIC PANEL, COMPREHENSIVE    Collection Time: 05/11/21  7:59 PM   Result Value Ref Range    Sodium 137 136 - 145 mmol/L    Potassium 4.7 3.5 - 5.1 mmol/L    Chloride 105 98 - 107 mmol/L    CO2 25 21 - 32 mmol/L    Anion gap 7 7 - 16 mmol/L    Glucose 192 (H) 65 - 100 mg/dL    BUN 12 8 - 23 MG/DL    Creatinine 1.09 0.8 - 1.5 MG/DL    GFR est AA >60 >60 ml/min/1.73m2    GFR est non-AA >60 >60 ml/min/1.73m2    Calcium 8.9 8.3 - 10.4 MG/DL    Bilirubin, total 0.5 0.2 - 1.1 MG/DL    ALT (SGPT) 24 12 - 65 U/L    AST (SGOT) 20 15 - 37 U/L    Alk.  phosphatase 57 50 - 136 U/L    Protein, total 7.3 6.3 - 8.2 g/dL    Albumin 3.7 3.2 - 4.6 g/dL    Globulin 3.6 (H) 2.3 - 3.5 g/dL    A-G Ratio 1.0 (L) 1.2 - 3.5     TROPONIN-HIGH SENSITIVITY    Collection Time: 05/11/21  7:59 PM   Result Value Ref Range    Troponin-High Sensitivity 10.3 0 - 14 pg/mL   NT-PRO BNP    Collection Time: 05/11/21  7:59 PM   Result Value Ref Range    NT pro- <450 PG/ML   PROCALCITONIN    Collection Time: 05/11/21  7:59 PM   Result Value Ref Range    Procalcitonin <0.05 ng/mL   LACTIC ACID    Collection Time: 05/11/21  8:15 PM   Result Value Ref Range    Lactic acid 1.5 0.4 - 2.0 MMOL/L   BLOOD GAS, ARTERIAL POC    Collection Time: 05/11/21  9:14 PM   Result Value Ref Range    Device: ROOM AIR      pH (POC) 7.43 7.35 - 7.45      pCO2 (POC) 35.7 35 - 45 MMHG    pO2 (POC) 51 (L) 75 - 100 MMHG    HCO3 (POC) 23.5 22 - 26 MMOL/L    sO2 (POC) 86.5 (L) 95 - 98 %    Base deficit (POC) 0.5 mmol/L    Allens test (POC) Positive      Site RIGHT RADIAL      Specimen type (POC) ARTERIAL      Performed by Boyd    TROPONIN-HIGH SENSITIVITY    Collection Time: 05/11/21 10:23 PM   Result Value Ref Range    Troponin-High Sensitivity 13.0 0 - 14 pg/mL       EKG Results     Procedure 720 Value Units Date/Time    EKG, 12 LEAD, INITIAL [[id number]] Collected: 05/11/21 1948    Order Status: Completed Updated: 05/11/21 2006     Ventricular Rate 110 BPM      Atrial Rate 110 BPM      P-R Interval 174 ms      QRS Duration 88 ms      Q-T Interval 316 ms      QTC Calculation (Bezet) 427 ms      Calculated P Axis 94 degrees      Calculated R Axis 73 degrees      Calculated T Axis 53 degrees      Diagnosis --     !! AGE AND GENDER SPECIFIC ECG ANALYSIS !! Sinus tachycardia with frequent and consecutive Premature ventricular   complexes and Fusion complexes  Abnormal ECG  When compared with ECG of 31-DEC-2020 01:24,  Fusion complexes are now Present  Premature supraventricular complexes are no longer Present  NV interval has decreased  QRS axis Shifted left            All Micro Results     Procedure Component Value Units Date/Time    BLOOD CULTURE [[id number]] Collected: 05/11/21 1950    Order Status: Completed Specimen: Blood Updated: 05/11/21 2059    BLOOD CULTURE [[id number]]     Order Status: Sent Specimen: Blood           Other Studies:  Ct Head Wo Cont    Result Date: 5/11/2021  CT head without contrast History: Altered mental status, fever, sepsis Technique: 5mm axial images were obtained from the skull base to the vertex without intravenous contrast.  Radiation dose reduction techniques were used for this study:  Our CT scanners use one or all of the following: Automated exposure control, adjustment of the mA and/or kVp according to patient's size, iterative reconstruction. Comparison: None Findings: There is mild motion artifact during the exam. The ventricles and sulci are normal in appropriate for age. There are no extra-axial fluid collections. There is no evidence to suggest an acute major territorial infarct. There is no evidence of acute intraparenchymal hemorrhage or mass effect. The bony calvarium is intact. The visualized mastoid air cells and paranasal sinuses are well pneumatized and aerated. Unremarkable unenhanced CT scan of the brain when allowing for mild motion artifact.      Xr Chest Port    Result Date: 5/11/2021  Portable chest: History: Meets SIRS criteria Comparison: 01/26/2021 Findings: A single view of the chest was obtained at 2011 hours. The cardiac silhouette is enlarged, unchanged. There is mild bibasilar atelectasis/infiltrate with extensive bilateral airspace opacities having otherwise improved. There are no pleural effusions. The pulmonary vasculature is within normal limits. Overall improvement in bilateral airspace opacities. Mild bibasilar atelectasis/infiltrates are now present.          Medications:  Medications Administered      Medications Administered     acetaminophen (TYLENOL) tablet 1,000 mg     Admin Date  05/11/2021 Action  Given Dose  1,000 mg Route  Oral Administered By  Heather Licona RN          azithromycin (ZITHROMAX) 500 mg in 0.9% sodium chloride 250 mL (VIAL-MATE)     Admin Date  05/11/2021 Action  Given Dose  500 mg Rate  250 mL/hr Route  IntraVENous Administered By  Heather Licona RN          cefepime (MAXIPIME) 1 g in 0.9% sodium chloride (MBP/ADV) 50 mL MBP     Admin Date  05/12/2021 Action  New Bag Dose  1 g Rate  100 mL/hr Route  IntraVENous Administered By  Shanelle Vasquez RN          cefTRIAXone (ROCEPHIN) 1 g in 0.9% sodium chloride (MBP/ADV) 50 mL MBP     Admin Date  05/11/2021 Action  New Bag Dose  1 g Rate  100 mL/hr Route  IntraVENous Administered By  Heather Licona RN          diphenhydrAMINE (BENADRYL) injection 25 mg     Admin Date  05/11/2021 Action  Given Dose  25 mg Route  IntraVENous Administered By  Heather Licona RN          ibuprofen (MOTRIN) tablet 800 mg     Admin Date  05/11/2021 Action  Given Dose  800 mg Route  Oral Administered By  Heather Licona RN          methylPREDNISolone (PF) (Solu-MEDROL) injection 125 mg     Admin Date  05/11/2021 Action  Given Dose  125 mg Route  IntraVENous Administered By  Heather Licona RN          sodium chloride (NS) flush 5-40 mL     Admin Date  05/12/2021 Action  Given Dose  5 mL Route  IntraVENous Administered By  Sunshine Nieves RN          sodium chloride 0.9 % bolus infusion 1,000 mL     Admin Date  05/11/2021 Action  New Bag Dose  1,000 mL Rate  1,000 mL/hr Route  IntraVENous Administered By  Nneka Ponce RN                    Signed By: Aleix Diana MD   [facility]    May 12, 2021   2:50 AM

## 2021-05-12 NOTE — PROGRESS NOTES
TRANSFER - IN REPORT:    Verbal report received from Brandan Pandya RN(name) on Bulmaro Benites  being received from ER(unit) for routine progression of care      Report consisted of patients Situation, Background, Assessment and   Recommendations(SBAR). Information from the following report(s) SBAR, Kardex, [facility] and Recent Results was reviewed with the receiving nurse. Opportunity for questions and clarification was provided. Report given to Tony Zamudio RN, who will assume primary care on arrival to floor.

## 2021-05-12 NOTE — PROGRESS NOTES
Chart review complete, CM met with pt and spouse at bedside, CM maintained social distance and PPE in place, pt found sleeping easily awakens, spouse answers most of CM questions. Per wife Germain Rincon she states she and pt live in 2 level home but pt does not go down into the basement. States 1 step to enter home, pt is normally independent with adls, drives, has home oxygen that he uses PRN pt thinks oxygen from [employer], no other DME in home at this time per spouse. Pt has hx of covid in March 2021  Demographics, PCP and insurance confirmed. Pt and spouse made aware CM will remain available to assist with dc needs once admitted to floor, verbalized understanding.     Care Management Interventions  PCP Verified by CM: Yes(Dr Tanisha Rico)  Discharge Durable Medical Equipment: No  Physical Therapy Consult: No  Occupational Therapy Consult: No  Speech Therapy Consult: No  Current Support Network: Own Home, Lives with Spouse  Confirm Follow Up Transport: Family  Discharge Location  Discharge Placement: Unable to determine at this time

## 2021-05-12 NOTE — ED PROVIDER NOTES
79-year-old male patient with a history of COPD presents to the emergency department with reports of altered mental status, fever and concern for sepsis. Patient states he began feeling poorly this morning at approximately 6 AM.  This progressed throughout the day, his wife was at bedside states he was acting normally when she left around in [facility]. When she returned, patient was acting oddly and confused. EMS was called, fever of 103 per report. He was also tachycardic with multifocal PVCs consistent with bigeminy. Patient was given a lidocaine push IV and started on a drip for this issue. Sepsis protocol was initiated however given the lack of source, patient received no medication for sepsis. Patient reports history of COPD but does not use oxygen. Sats durations of 88% per EMS providers. Of note, patient was diagnosed with COVID-19 earlier this year and required extended hospital and rehab stay lasting approximate 40 days. Patient has been at home and feeling well up until today. He has been vaccinated with both vaccines, completed his course last month.            Past Medical History:   Diagnosis Date    AGUSTIN (acute kidney injury) ([facility] [address] 9/15/2014    Allergic rhinitis, CAUSE UNSPECIFIED 11/10/2015    Asthma; SEASONAL 11/10/2015    Benign essential hypertension 11/10/2015    Benign neoplasm of colon 11/10/2015    CAD (coronary artery disease) 1998, 1999    mix2, 3 stents [id number]    CAD (coronary artery disease) 11/10/2015    Cardiomyopathy ([address] 85/87/5746    Complicated wound infection 11/10/2015    COPD /OTHER 11/10/2015    COPD exacerbation ([address] 10/12/2011    Diabetes mellitus type 2, controlled ([address] 11/10/2015    Diabetic neuropathy  11/10/2015    Disruption of wound, unspecified 10/9/2014    Encounter for long-term (current) use of other high-risk medications 11/10/2015    Extremity atherosclerosis with intermittent claudication ([address] 11/10/2015    H/O endarterectomy; 9/2014 11/10/2015    Hiatal hernia 11/10/2015    Hyperglycemia  11/10/2015    Hyperlipidemia 11/10/2015    Myocardial infarction ([facility] [address] 1999    Myocardial infarction ([facility] [address] (1999) 11/10/2015    Obesity, UNSPECIFIED 11/10/2015    Orthostatic hypotension 11/10/2015    Osteoarthritis 11/10/2015    Peripheral vascular disease ([facility] [address] 2/2014; S/P BILAT FEMORAL 11/10/2015    PVC (premature ventricular contraction)     Rash 35/89/9732    Seroma complicating a procedure 10/2/2014    Sleep apnea     cpap    Uncontrolled type 2 diabetes mellitus ([facility] [address] 11/10/2015    Vertiginous syndromes & LABYRINTHINE DISORDERS/UNSPEC 11/10/2015       Past Surgical History:   Procedure Laterality Date    HX CORONARY STENT PLACEMENT  1999    HX CORONARY STENT PLACEMENT  12/05/2018    LCX OM1:2.5 x 12 Yoel MIRELA    HX HEART CATHETERIZATION  12/05/2018    LV EF=40%. LM:nl. LAD:30-40% mid stenosis. LCX OM1:95% stenosis. RCA:100% occlusion at RV branch.     SC ABDOMEN SURGERY PROC UNLISTED  1960    abdominal cyst removed    SC CARDIAC SURG PROCEDURE UNLIST  1999    stents x3    VASCULAR SURGERY PROCEDURE UNLIST  9/11/14    sally femoral endarterectomy    VASCULAR SURGERY PROCEDURE UNLIST  11/5/14    Repair of right common femoral artery          Family History:   Problem Relation Age of Onset    Stroke Mother     Hypertension Mother     Breast Cancer Mother     Heart Attack Father     Heart Disease Father     Other Father         DIVERTICULITIS    Lung Disease Brother         mesothioloma    Diabetes Sister     Crohn's Disease Sister        Social History     Socioeconomic History    Marital status:      Spouse name: Not on file    Number of children: Not on file    Years of education: Not on file    Highest education level: Not on file   Occupational History    Occupation: retired    Occupation: [employer]     Comment: Positive for Asbestos exposure    Occupation: [employer] resource strain: Not on file    Food insecurity     Worry: Not on file     Inability: Not on file    Transportation needs     Medical: Not on file     Non-medical: Not on file   Tobacco Use    Smoking status: Former Smoker     Packs/day: 1.00     Years: 50.00     Pack years: 50.00     Quit date: 10/2/2011     Years since quittin.6    Smokeless tobacco: Current User     Types: Chew   Substance and Sexual Activity    Alcohol use: Yes     Alcohol/week: 0.0 standard drinks     Comment: rare    Drug use: No    Sexual activity: Not on file   Lifestyle    Physical activity     Days per week: Not on file     Minutes per session: Not on file    Stress: Not on file   Relationships    Social connections     Talks on phone: Not on file     Gets together: Not on file     Attends Anabaptist service: Not on file     Active member of club or organization: Not on file     Attends meetings of clubs or organizations: Not on file     Relationship status: Not on file    Intimate partner violence     Fear of current or ex partner: Not on file     Emotionally abused: Not on file     Physically abused: Not on file     Forced sexual activity: Not on file   Other Topics Concern    Not on file   Social History Narrative    Lives with wife         ALLERGIES: Daypro [oxaprozin]    Review of Systems   Constitutional: Positive for chills, fatigue and fever. Negative for diaphoresis. HENT: Negative for congestion, sneezing and sore throat. Eyes: Negative for visual disturbance. Respiratory: Positive for cough and shortness of breath. Negative for chest tightness and wheezing. Cardiovascular: Negative for chest pain and leg swelling. Gastrointestinal: Negative for abdominal pain, blood in stool, diarrhea, nausea and vomiting. Endocrine: Negative for polyuria. Genitourinary: Negative for difficulty urinating, dysuria, flank pain, hematuria and urgency.    Musculoskeletal: Negative for back pain, myalgias, neck pain and neck stiffness. Skin: Negative for color change and rash. Neurological: Negative for dizziness, syncope, speech difficulty, weakness, light-headedness, numbness and headaches. Psychiatric/Behavioral: Positive for confusion. Negative for behavioral problems. All other systems reviewed and are negative. Vitals:    05/11/21 1946 05/11/21 1947 05/11/21 1949   BP: (!) 147/73 (!) 147/73 (!) 150/65   Pulse: (!) 114 (!) 111 (!) 110   Resp:  21    Temp:  99.8 °F (37.7 °C)    SpO2: 93% (!) 88% 94%   Weight:  119.3 kg (263 lb)    Height:  6' 1\" (1.854 m)             Physical Exam  Vitals signs and nursing note reviewed. Constitutional:       General: He is not in acute distress. Appearance: He is well-developed. He is not diaphoretic. Comments: Alert and oriented to person place and time. No acute distress, speaks in clear, fluid sentences. HENT:      Head: Normocephalic and atraumatic. Right Ear: External ear normal.      Left Ear: External ear normal.      Nose: Nose normal.   Eyes:      Pupils: Pupils are equal, round, and reactive to light. Neck:      Musculoskeletal: Normal range of motion. Cardiovascular:      Rate and Rhythm: Normal rate and regular rhythm. Heart sounds: Normal heart sounds. No murmur. No friction rub. No gallop. Pulmonary:      Effort: Pulmonary effort is normal. No respiratory distress. Breath sounds: No stridor. Decreased breath sounds and rhonchi present. No wheezing or rales. Comments: Coughing intermittently during exam.  Coarse breath sounds bilaterally with faint rhonchi auscultated bilaterally. No significant tachypnea or respiratory difficulty/distress. Chest:      Chest wall: No tenderness. Abdominal:      General: There is no distension. Palpations: Abdomen is soft. There is no mass. Tenderness: There is no abdominal tenderness. There is no guarding or rebound. Hernia: No hernia is present.       Comments: Obese, reports generalized tenderness on exam.  No distention rebound or guarding. Musculoskeletal: Normal range of motion. General: No tenderness or deformity. Skin:     General: Skin is warm and dry. Neurological:      Mental Status: He is alert and oriented to person, place, and time. Cranial Nerves: No cranial nerve deficit. MDM  Number of Diagnoses or Management Options  Diagnosis management comments: EKG interpretation: Sinus tachycardia with a rate of 110. There are frequent PVCs noted. No evidence of acute ischemic change.        Amount and/or Complexity of Data Reviewed  Clinical lab tests: ordered and reviewed  Tests in the radiology section of CPT®: ordered and reviewed  Tests in the medicine section of CPT®: ordered and reviewed  Independent visualization of images, tracings, or specimens: yes    Risk of Complications, Morbidity, and/or Mortality  Presenting problems: high  Diagnostic procedures: high  Management options: high    Patient Progress  Patient progress: stable         Procedures

## 2021-05-12 NOTE — PROGRESS NOTES
Initial visit in the ER, a spiritual presence, emotional presence and prayer were provided for the patient.        Kandis Lima, [address] [facility], [facility]

## 2021-05-12 NOTE — PROGRESS NOTES
Pt arrived to room 807 alert and oriented, NAD, breathing unlabored on 2L NC SaO2 97%, VSS. Pt denies pain. Skin wholly intact. Mild wheezes and coarse upper lung sounds. Pending sputum culture collection.

## 2021-05-12 NOTE — PROGRESS NOTES
[facility]ist Progress Note     Name:  Kaushik Diaz  Age:76 y.o. Sex:male   :  1945    MRN:  [id number]     Admit Date:  2021    Reason for Admission:  Pneumonia [J18.9]    Hospital Course/Interval history:     Kaushik Diaz is a 67 y.o. male with medical history of COPD (not on oxygen at home) presents to the ED with reports of altered mental status, fever and concern for sepsis, SOB.  EMS was called, fever of 103 per report. Steven Handler was also tachycardic with multifocal PVCs consistent with bigeminy.  Patient was given a lidocaine push IV and started on a drip for this issue.  Sepsis protocol was initiated however given the lack of source, patient received no medication for sepsis. O2Sats of 88% per EMS providers.  Of note, patient was diagnosed with COVID-19 earlier this year and required extended hospital and rehab stay lasting approximately 40 days.  Patient has been at home and feeling well up until now. He has been vaccinated with both vaccines, completed his course last month.     In ER he had CXR with subtle bilateral infiltrates, WBC elevated at 14K, but lactic acid 1.5 and procal <0.05. He subsequently received IV abx and IVF in ER. Hospitalist asked to admit    Subjective (21): Patient stated that he feels much better now. Not on any oxygen while sitting in recliner on my arrival.  Very talkative without conversational dyspnea. Shon Gleason stated that he is ready to go home now. Pleasant. Denies fever, chills, shortness of breath, chest pain, abdominal pain, nausea, vomiting, diarrhea, constipation. Review of Systems: 14 point review of systems is otherwise negative with the exception of the elements mentioned above.   Assessment & Plan     Acute metabolic encephalopathy, resolved  CT head unremearkable  Avoid sedating meds  Fall precautions  Delirium precautions  PT/OT to eval and treat  Infection management as stated    Sepsis  CAP  Meets sepsis criteria with WBC>12K, T>100.9 on admission. Meets qSOFA of 3 with AMS, Rr>22, SBP<100. LA wnl. Most likely 2/2 PNA. CXR shows overall improvement in B/L opacities with mild bibasilar atelectasis/infiltrates are now present. Abx: cefepime/doxy (5/12-. ..). D/c Vanc d/t MRSA neg and add doxy for atypical coverage  Probiotics  MRSA negative, procal negative  BCx 5/11: pending  SpCx: pending    Leukocytosis  2/2 infection and steroids  CTM    Respiratory failure with hypoxia, resolved  Most likely 2/2 CAP vs acute COPD exacerbations  CXR shows overall improvement in B/L opacities with mild bibasilar atelectasis/infiltrates are now present. Febrile with WBC>12K on admission. Received Solumedrol, Vanc, Ceftrixone and Azithro in ED  Cont abx per above  Holding further steroids as no more wheezing on exam  Weaned down to RA this afternoon    DM 2  Holding home Metformin, Januvia, glipizide  Continue SSI  Diabetic diet    CAD s/p PCI w/ stents  Continue home aspirin and Plavix, statin    COPD  Continue home butyryl, Advair, Flonase    HLD  Continue home statin    NOMI  CPAP nightly    HTN  Holding home amlodipine-valsartan as BP on the low end of normal      Diet:  DIET DIABETIC CONSISTENT CARB  DVT PPx: SCD's  Code status: Full Code  Disposition/Expected LOS: 1-2 days once cultures negative and pt stays stable. PT/OT.               Objective:     Patient Vitals for the past 24 hrs:   Temp Pulse Resp BP SpO2   05/12/21 1417 -- -- -- -- 93 %   05/12/21 1331 97.9 °F (36.6 °C) 83 18 112/64 97 %   05/12/21 1233 -- 79 (!) 42 -- 97 %   05/12/21 1228 -- 72 (!) 37 123/60 --   05/12/21 1128 -- -- -- 108/63 99 %   05/12/21 1059 -- 68 24 (!) 111/58 --   05/12/21 0959 -- -- -- (!) 109/59 99 %   05/12/21 0905 -- -- -- -- 96 %   05/12/21 0859 -- -- -- (!) 111/57 96 %   05/12/21 0829 -- -- -- (!) 114/59 95 %   05/12/21 0729 -- 71 20 (!) 99/50 99 %   05/12/21 0629 -- 80 (!) 39 (!) 87/50 --   05/12/21 0559 -- 72 18 (!) 93/53 --   05/12/21 0529 -- 71 22 (!) 91/54 --   05/12/21 0459 -- 91 17 (!) 91/53 --   05/12/21 0430 -- 77 22 (!) 88/47 --   05/12/21 0400 -- 85 -- (!) 88/50 --   05/12/21 0345 -- 85 21 (!) 115/58 95 %   05/12/21 0338 -- 81 21 (!) 89/50 96 %   05/12/21 0337 -- 73 (!) 50 (!) 87/51 --   05/12/21 0320 -- 83 17 (!) 87/49 --   05/12/21 0300 -- 89 15 122/62 --   05/12/21 0240 -- 65 19 115/61 --   05/12/21 0220 -- 77 20 115/63 95 %   05/12/21 0200 99.6 °F (37.6 °C) 85 20 119/60 95 %   05/12/21 0140 -- 93 18 117/61 92 %   05/12/21 0120 -- 88 21 111/62 94 %   05/12/21 0111 -- 99 22 105/61 94 %   05/11/21 2359 -- 87 -- (!) 128/58 --   05/11/21 2339 -- 96 -- 120/63 --   05/11/21 2320 -- 93 18 119/65 --   05/11/21 2300 -- (!) 105 -- (!) 111/54 95 %   05/11/21 2239 -- 94 23 130/69 92 %   05/11/21 2220 -- (!) 103 22 (!) 112/58 90 %   05/11/21 2200 -- (!) 108 -- 102/61 92 %   05/11/21 2145 (!) 102.2 °F (39 °C) -- -- -- --   05/11/21 2139 -- (!) 109 23 (!) 114/55 93 %   05/11/21 2119 -- (!) 105 20 (!) 110/58 93 %   05/11/21 2107 -- (!) 101 26 (!) 114/59 94 %   05/11/21 2015 -- (!) 116 19 127/60 95 %   05/11/21 1959 -- (!) 113 16 130/68 92 %   05/11/21 1949 -- (!) 110 -- (!) 150/65 94 %   05/11/21 1947 99.8 °F (37.7 °C) (!) 111 21 (!) 147/73 (!) 88 %   05/11/21 1946 -- (!) 114 -- (!) 147/73 93 %     Oxygen Therapy  O2 Sat (%): 93 % (05/12/21 1417)  Pulse via Oximetry: 69 beats per minute (05/12/21 1233)  O2 Device: None (Room air) (05/12/21 1417)  O2 Flow Rate (L/min): 2 l/min (05/12/21 1331)  FIO2 (%): 32 % (05/12/21 0905)    Body mass index is 34.7 kg/m².     Physical Exam:   General:  No acute distress, speaking in full sentences, no use of accessory muscles   Lungs:  Mild coarse lung sounds at bases to auscultation bilaterally   CV:  Regular rate and rhythm with normal S1 and S2   Abdomen:  Soft, nontender, nondistended, normoactive bowel sounds   Extremities:  No cyanosis clubbing or edema   Neuro:   Nonfocal, A&O x3   Psych:  Normal affect     Data Review:  I have reviewed all labs, meds, and studies from the last 24 hours:    Labs:    Recent Results (from the past 24 hour(s))   EKG, 12 LEAD, INITIAL    Collection Time: 05/11/21  7:48 PM   Result Value Ref Range    Ventricular Rate 110 BPM    Atrial Rate 110 BPM    P-R Interval 174 ms    QRS Duration 88 ms    Q-T Interval 316 ms    QTC Calculation (Bezet) 427 ms    Calculated P Axis 94 degrees    Calculated R Axis 73 degrees    Calculated T Axis 53 degrees    Diagnosis       !! AGE AND GENDER SPECIFIC ECG ANALYSIS !! Sinus tachycardia with frequent and consecutive Premature ventricular   complexes and Fusion complexes  Nonspecific ST abnormality  Abnormal ECG  When compared with ECG of 31-DEC-2020 01:24,  Fusion complexes are now Present  Premature supraventricular complexes are no longer Present  VA interval has decreased  QRS axis Shifted left  Confirmed by [facility]  MD (), OSIEL WASHINGTON ([id number]) on 5/12/2021 7:16:19 AM     CULTURE, BLOOD    Collection Time: 05/11/21  7:50 PM    Specimen: Blood   Result Value Ref Range    Special Requests: RIGHT ANTECUBITAL      Culture result: NO GROWTH AFTER 10 HOURS     CBC WITH AUTOMATED DIFF    Collection Time: 05/11/21  7:59 PM   Result Value Ref Range    WBC 14.2 (H) 4.3 - 11.1 K/uL    RBC 3.70 (L) 4.23 - 5.6 M/uL    HGB 11.6 (L) 13.6 - 17.2 g/dL    HCT 35.0 (L) 41.1 - 50.3 %    MCV 94.6 79.6 - 97.8 FL    MCH 31.4 26.1 - 32.9 PG    MCHC 33.1 31.4 - 35.0 g/dL    RDW 16.0 (H) 11.9 - 14.6 %    PLATELET 269 979 - 845 K/uL    MPV 10.5 9.4 - 12.3 FL    ABSOLUTE NRBC 0.00 0.0 - 0.2 K/uL    DF AUTOMATED      NEUTROPHILS 85 (H) 43 - 78 %    LYMPHOCYTES 5 (L) 13 - 44 %    MONOCYTES 9 4.0 - 12.0 %    EOSINOPHILS 0 (L) 0.5 - 7.8 %    BASOPHILS 0 0.0 - 2.0 %    IMMATURE GRANULOCYTES 1 0.0 - 5.0 %    ABS. NEUTROPHILS 12.1 (H) 1.7 - 8.2 K/UL    ABS. LYMPHOCYTES 0.7 0.5 - 4.6 K/UL    ABS. MONOCYTES 1.2 0.1 - 1.3 K/UL    ABS. EOSINOPHILS 0.0 0.0 - 0.8 K/UL    ABS.  BASOPHILS 0.0 0.0 - 0.2 K/UL ABS. IMM. GRANS. 0.1 0.0 - 0.5 K/UL   METABOLIC PANEL, COMPREHENSIVE    Collection Time: 05/11/21  7:59 PM   Result Value Ref Range    Sodium 137 136 - 145 mmol/L    Potassium 4.7 3.5 - 5.1 mmol/L    Chloride 105 98 - 107 mmol/L    CO2 25 21 - 32 mmol/L    Anion gap 7 7 - 16 mmol/L    Glucose 192 (H) 65 - 100 mg/dL    BUN 12 8 - 23 MG/DL    Creatinine 1.09 0.8 - 1.5 MG/DL    GFR est AA >60 >60 ml/min/1.73m2    GFR est non-AA >60 >60 ml/min/1.73m2    Calcium 8.9 8.3 - 10.4 MG/DL    Bilirubin, total 0.5 0.2 - 1.1 MG/DL    ALT (SGPT) 24 12 - 65 U/L    AST (SGOT) 20 15 - 37 U/L    Alk.  phosphatase 57 50 - 136 U/L    Protein, total 7.3 6.3 - 8.2 g/dL    Albumin 3.7 3.2 - 4.6 g/dL    Globulin 3.6 (H) 2.3 - 3.5 g/dL    A-G Ratio 1.0 (L) 1.2 - 3.5     TROPONIN-HIGH SENSITIVITY    Collection Time: 05/11/21  7:59 PM   Result Value Ref Range    Troponin-High Sensitivity 10.3 0 - 14 pg/mL   NT-PRO BNP    Collection Time: 05/11/21  7:59 PM   Result Value Ref Range    NT pro- <450 PG/ML   PROCALCITONIN    Collection Time: 05/11/21  7:59 PM   Result Value Ref Range    Procalcitonin <0.05 ng/mL   LACTIC ACID    Collection Time: 05/11/21  8:15 PM   Result Value Ref Range    Lactic acid 1.5 0.4 - 2.0 MMOL/L   BLOOD GAS, ARTERIAL POC    Collection Time: 05/11/21  9:14 PM   Result Value Ref Range    Device: ROOM AIR      pH (POC) 7.43 7.35 - 7.45      pCO2 (POC) 35.7 35 - 45 MMHG    pO2 (POC) 51 (L) 75 - 100 MMHG    HCO3 (POC) 23.5 22 - 26 MMOL/L    sO2 (POC) 86.5 (L) 95 - 98 %    Base deficit (POC) 0.5 mmol/L    Allens test (POC) Positive      Site RIGHT RADIAL      Specimen type (POC) ARTERIAL      Performed by Boyd    TROPONIN-HIGH SENSITIVITY    Collection Time: 05/11/21 10:23 PM   Result Value Ref Range    Troponin-High Sensitivity 13.0 0 - 14 pg/mL   URINALYSIS W/ RFLX MICROSCOPIC    Collection Time: 05/12/21  2:28 AM   Result Value Ref Range    Color YELLOW      Appearance TURBID      Specific gravity 1.017 1.001 - 1.023      pH (UA) 5.0 5.0 - 9.0      Protein TRACE (A) NEG mg/dL    Glucose Negative mg/dL    Ketone TRACE (A) NEG mg/dL    Bilirubin Negative NEG      Blood Negative NEG      Urobilinogen 0.2 0.2 - 1.0 EU/dL    Nitrites Negative NEG      Leukocyte Esterase Negative NEG      WBC 0-3 0 /hpf    RBC 0-3 0 /hpf    Epithelial cells 0 0 /hpf    Bacteria 0 0 /hpf    Casts 0 0 /lpf   CBC WITH AUTOMATED DIFF    Collection Time: 05/12/21  7:48 AM   Result Value Ref Range    WBC 23.2 (H) 4.3 - 11.1 K/uL    RBC 3.57 (L) 4.23 - 5.6 M/uL    HGB 11.2 (L) 13.6 - 17.2 g/dL    HCT 34.6 (L) 41.1 - 50.3 %    MCV 96.9 79.6 - 97.8 FL    MCH 31.4 26.1 - 32.9 PG    MCHC 32.4 31.4 - 35.0 g/dL    RDW 15.8 (H) 11.9 - 14.6 %    PLATELET 177 919 - 547 K/uL    MPV 10.4 9.4 - 12.3 FL    ABSOLUTE NRBC 0.00 0.0 - 0.2 K/uL    NEUTROPHILS 89 (H) 43 - 78 %    LYMPHOCYTES 4 (L) 13 - 44 %    MONOCYTES 6 4.0 - 12.0 %    EOSINOPHILS 0 (L) 0.5 - 7.8 %    BASOPHILS 0 0.0 - 2.0 %    IMMATURE GRANULOCYTES 1 0.0 - 5.0 %    ABS. NEUTROPHILS 20.7 (H) 1.7 - 8.2 K/UL    ABS. LYMPHOCYTES 0.9 0.5 - 4.6 K/UL    ABS. MONOCYTES 1.4 (H) 0.1 - 1.3 K/UL    ABS. EOSINOPHILS 0.0 0.0 - 0.8 K/UL    ABS. BASOPHILS 0.0 0.0 - 0.2 K/UL    ABS. IMM.  GRANS. 0.2 0.0 - 0.5 K/UL    RBC COMMENTS NORMOCYTIC/NORMOCHROMIC      WBC COMMENTS Result Confirmed By Smear      PLATELET COMMENTS ADEQUATE      DF AUTOMATED     METABOLIC PANEL, BASIC    Collection Time: 05/12/21  7:48 AM   Result Value Ref Range    Sodium 139 138 - 145 mmol/L    Potassium 4.4 3.5 - 5.1 mmol/L    Chloride 106 98 - 107 mmol/L    CO2 27 21 - 32 mmol/L    Anion gap 6 (L) 7 - 16 mmol/L    Glucose 230 (H) 65 - 100 mg/dL    BUN 15 8 - 23 MG/DL    Creatinine 1.09 0.8 - 1.5 MG/DL    GFR est AA >60 >60 ml/min/1.73m2    GFR est non-AA >60 >60 ml/min/1.73m2    Calcium 8.3 8.3 - 10.4 MG/DL   GLUCOSE, POC    Collection Time: 05/12/21  8:27 AM   Result Value Ref Range    Glucose (POC) 215 (H) 65 - 100 mg/dL    Performed by Linda    D DIMER    Collection Time: 05/12/21  9:19 AM   Result Value Ref Range    D DIMER 0.78 (H) <0.56 ug/ml(FEU)   GLUCOSE, POC    Collection Time: 05/12/21  9:24 AM   Result Value Ref Range    Glucose (POC) 214 (H) 65 - 100 mg/dL    Performed by Loki Spears, POC    Collection Time: 05/12/21 12:42 PM   Result Value Ref Range    Glucose (POC) 187 (H) 65 - 100 mg/dL    Performed by Linda    MSSA/MRSA SC BY PCR, NASAL SWAB    Collection Time: 05/12/21  1:49 PM    Specimen: Nasal swab    SWAB   Result Value Ref Range    Special Requests: NO SPECIAL REQUESTS      Culture result:        SA target not detected. A MRSA NEGATIVE, SA NEGATIVE test result does not preclude MRSA or SA nasal colonization. GLUCOSE, POC    Collection Time: 05/12/21  4:09 PM   Result Value Ref Range    Glucose (POC) 169 (H) 65 - 100 mg/dL    Performed by Ramez        All Micro Results     Procedure Component Value Units Date/Time    MSSA/MRSA SC BY PCR, NASAL SWAB [[id number]] Collected: 05/12/21 1349    Order Status: Completed Specimen: Nasal swab Updated: 05/12/21 1532     Special Requests: NO SPECIAL REQUESTS        Culture result:       SA target not detected. A MRSA NEGATIVE, SA NEGATIVE test result does not preclude MRSA or SA nasal colonization.           CULTURE, RESPIRATORY/SPUTUM/BRONCH Vladimir Harding [[id number]]     Order Status: Sent Specimen: Sputum     BLOOD CULTURE [[id number]] Collected: 05/11/21 1950    Order Status: Completed Specimen: Blood Updated: 05/12/21 0712     Special Requests: RIGHT ANTECUBITAL        Culture result: NO GROWTH AFTER 10 HOURS       BLOOD CULTURE [[id number]]     Order Status: Sent Specimen: Blood           EKG Results     Procedure 720 Value Units Date/Time    EKG, 12 LEAD, INITIAL [[id number]] Collected: 05/11/21 1948    Order Status: Completed Updated: 05/12/21 0716     Ventricular Rate 110 BPM Atrial Rate 110 BPM      P-R Interval 174 ms      QRS Duration 88 ms      Q-T Interval 316 ms      QTC Calculation (Bezet) 427 ms      Calculated P Axis 94 degrees      Calculated R Axis 73 degrees      Calculated T Axis 53 degrees      Diagnosis --     !! AGE AND GENDER SPECIFIC ECG ANALYSIS !! Sinus tachycardia with frequent and consecutive Premature ventricular   complexes and Fusion complexes  Nonspecific ST abnormality  Abnormal ECG  When compared with ECG of 31-DEC-2020 01:24,  Fusion complexes are now Present  Premature supraventricular complexes are no longer Present  NE interval has decreased  QRS axis Shifted left  Confirmed by [facility]  MD (), OSIEL WASHINGTON ([id number]) on 5/12/2021 7:16:19 AM            Other Studies:  Ct Head Wo Cont    Result Date: 5/11/2021  CT head without contrast History: Altered mental status, fever, sepsis Technique: 5mm axial images were obtained from the skull base to the vertex without intravenous contrast.  Radiation dose reduction techniques were used for this study:  Our CT scanners use one or all of the following: Automated exposure control, adjustment of the mA and/or kVp according to patient's size, iterative reconstruction. Comparison: None Findings: There is mild motion artifact during the exam. The ventricles and sulci are normal in appropriate for age. There are no extra-axial fluid collections. There is no evidence to suggest an acute major territorial infarct. There is no evidence of acute intraparenchymal hemorrhage or mass effect. The bony calvarium is intact. The visualized mastoid air cells and paranasal sinuses are well pneumatized and aerated. Unremarkable unenhanced CT scan of the brain when allowing for mild motion artifact. Xr Chest Port    Result Date: 5/11/2021  Portable chest: History: Meets SIRS criteria Comparison: 01/26/2021 Findings: A single view of the chest was obtained at 2011 hours. The cardiac silhouette is enlarged, unchanged.  There is mild bibasilar atelectasis/infiltrate with extensive bilateral airspace opacities having otherwise improved. There are no pleural effusions. The pulmonary vasculature is within normal limits. Overall improvement in bilateral airspace opacities. Mild bibasilar atelectasis/infiltrates are now present.        Current Meds:   Current Facility-Administered Medications   Medication Dose Route Frequency    sodium chloride (NS) flush 5-40 mL  5-40 mL IntraVENous Q8H    sodium chloride (NS) flush 5-40 mL  5-40 mL IntraVENous PRN    cefepime (MAXIPIME) 1 g in 0.9% sodium chloride (MBP/ADV) 50 mL MBP  1 g IntraVENous Q12H    albuterol (PROVENTIL VENTOLIN) nebulizer solution 2.5 mg  2.5 mg Nebulization Q4H PRN    aspirin chewable tablet 81 mg  81 mg Oral QHS    clopidogreL (PLAVIX) tablet 75 mg  75 mg Oral DAILY    budesonide-formoterol (SYMBICORT) 80-4.5 mcg inhaler  2 Puff Inhalation BID RT    fluticasone propionate (FLONASE) 50 mcg/actuation nasal spray 2 Spray  2 Spray Both Nostrils DAILY    montelukast (SINGULAIR) tablet 10 mg  10 mg Oral QHS    magnesium oxide (MAG-OX) tablet 400 mg  400 mg Oral DAILY    guaiFENesin ER (MUCINEX) tablet 1,200 mg  1,200 mg Oral BID PRN    rosuvastatin (CRESTOR) tablet 10 mg  10 mg Oral QHS    acetaminophen (TYLENOL) tablet 650 mg  650 mg Oral Q6H PRN    alum-mag hydroxide-simeth (MYLANTA) oral suspension 30 mL  30 mL Oral Q4H PRN    temazepam (RESTORIL) capsule 15 mg  15 mg Oral QHS PRN    insulin lispro (HUMALOG) injection   SubCUTAneous AC&HS    vancomycin (VANCOCIN) 1250 mg in  ml infusion  1,250 mg IntraVENous Q12H    doxycycline (VIBRAMYCIN) capsule 100 mg  100 mg Oral Q12H       Problem List:  Hospital Problems as of 5/12/2021 Date Reviewed: 3/15/2021          Codes Class Noted - Resolved POA    * (Principal) HAP (hospital-acquired pneumonia) ICD-10-CM: J18.9, Y95  ICD-9-CM: 408  12/31/2020 - Present         Type 2 diabetes with nephropathy ([facility] ICD-10-CM: E11.21  ICD-9-CM: 250.40, 583.81  6/17/2019 - Present Yes        Pulmonary emphysema ([facility] ICD-10-CM: J43.9  ICD-9-CM: 492.8  11/17/2016 - Present Yes        Hyperlipidemia ICD-10-CM: E78.5  ICD-9-CM: 272.4  11/10/2015 - Present Yes        COPD (chronic obstructive pulmonary disease) ([facility] ICD-10-CM: J44.9  ICD-9-CM: 496  4/10/2015 - Present Yes        Coronary artery disease involving native coronary artery of native heart without angina pectoris ICD-10-CM: I25.10  ICD-9-CM: 414.01  10/12/2011 - Present Yes        HTN (hypertension) (Chronic) ICD-10-CM: I10  ICD-9-CM: 401.9  10/12/2011 - Present Yes        Sleep apnea (Chronic) ICD-10-CM: G47.30  ICD-9-CM: 780.57  10/12/2011 - Present Yes        Morbid obesity ([facility] (Chronic) ICD-10-CM: E66.01  ICD-9-CM: 278.01  10/12/2011 - Present Yes               Part of this note was written by using a voice dictation software and the note has been proof read but may still contain some grammatical/other typographical errors.     Signed By: Rosie Cerda, [id number] [facility]    May 12, 2021  5:15 PM

## 2021-05-12 NOTE — ED NOTES
TRANSFER - OUT REPORT:    Verbal report given to Ida(name) on Edouard Arora  being transferred to 807(unit) for routine progression of care       Report consisted of patients Situation, Background, Assessment and   Recommendations(SBAR). Information from the following report(s) SBAR, ED Summary, [facility] and Recent Results was reviewed with the receiving nurse. Lines:   Peripheral IV 05/11/21 Right Antecubital (Active)   Site Assessment Clean, dry, & intact 05/11/21 1949   Phlebitis Assessment 0 05/11/21 1949   Infiltration Assessment 0 05/11/21 1949   Dressing Status Clean, dry, & intact 05/11/21 1949        Opportunity for questions and clarification was provided.       Patient transported with:   [facility]

## 2021-05-12 NOTE — PROGRESS NOTES
Problem: Pneumonia: Day 1  Goal: Activity/Safety  Outcome: Progressing Towards Goal  Goal: Consults, if ordered  Outcome: Progressing Towards Goal  Goal: Diagnostic Test/Procedures  Outcome: Progressing Towards Goal  Goal: Nutrition/Diet  Outcome: Progressing Towards Goal  Goal: Medications  Outcome: Progressing Towards Goal  Goal: Respiratory  Outcome: Progressing Towards Goal  Goal: Treatments/Interventions/Procedures  Outcome: Progressing Towards Goal  Goal: Psychosocial  Outcome: Progressing Towards Goal  Goal: *Oxygen saturation within defined limits  Outcome: Progressing Towards Goal  Goal: *Hemodynamically stable  Outcome: Progressing Towards Goal  Goal: *Demonstrates progressive activity  Outcome: Progressing Towards Goal  Goal: *Tolerating diet  Outcome: Progressing Towards Goal

## 2021-05-13 LAB
ANION GAP SERPL CALC-SCNC: 8 MMOL/L (ref 7–16)
BASOPHILS # BLD: 0 K/UL (ref 0–0.2)
BASOPHILS NFR BLD: 0 % (ref 0–2)
BUN SERPL-MCNC: 16 MG/DL (ref 8–23)
CALCIUM SERPL-MCNC: 8.7 MG/DL (ref 8.3–10.4)
CHLORIDE SERPL-SCNC: 107 MMOL/L (ref 98–107)
CO2 SERPL-SCNC: 25 MMOL/L (ref 21–32)
CREAT SERPL-MCNC: 0.88 MG/DL (ref 0.8–1.5)
DIFFERENTIAL METHOD BLD: ABNORMAL
EOSINOPHIL # BLD: 0 K/UL (ref 0–0.8)
EOSINOPHIL NFR BLD: 0 % (ref 0.5–7.8)
ERYTHROCYTE [DISTWIDTH] IN BLOOD BY AUTOMATED COUNT: 15.9 % (ref 11.9–14.6)
GLUCOSE BLD STRIP.AUTO-MCNC: 151 MG/DL (ref 65–100)
GLUCOSE BLD STRIP.AUTO-MCNC: 152 MG/DL (ref 65–100)
GLUCOSE BLD STRIP.AUTO-MCNC: 193 MG/DL (ref 65–100)
GLUCOSE BLD STRIP.AUTO-MCNC: 194 MG/DL (ref 65–100)
GLUCOSE SERPL-MCNC: 154 MG/DL (ref 65–100)
HCT VFR BLD AUTO: 32.5 % (ref 41.1–50.3)
HGB BLD-MCNC: 10.6 G/DL (ref 13.6–17.2)
IMM GRANULOCYTES # BLD AUTO: 0.1 K/UL (ref 0–0.5)
IMM GRANULOCYTES NFR BLD AUTO: 1 % (ref 0–5)
LYMPHOCYTES # BLD: 1.7 K/UL (ref 0.5–4.6)
LYMPHOCYTES NFR BLD: 11 % (ref 13–44)
MCH RBC QN AUTO: 31.4 PG (ref 26.1–32.9)
MCHC RBC AUTO-ENTMCNC: 32.6 G/DL (ref 31.4–35)
MCV RBC AUTO: 96.2 FL (ref 79.6–97.8)
MONOCYTES # BLD: 1.1 K/UL (ref 0.1–1.3)
MONOCYTES NFR BLD: 7 % (ref 4–12)
NEUTS SEG # BLD: 12.9 K/UL (ref 1.7–8.2)
NEUTS SEG NFR BLD: 81 % (ref 43–78)
NRBC # BLD: 0 K/UL (ref 0–0.2)
PLATELET # BLD AUTO: 181 K/UL (ref 150–450)
PMV BLD AUTO: 10.3 FL (ref 9.4–12.3)
POTASSIUM SERPL-SCNC: 3.9 MMOL/L (ref 3.5–5.1)
RBC # BLD AUTO: 3.38 M/UL (ref 4.23–5.6)
SERVICE CMNT-IMP: ABNORMAL
SODIUM SERPL-SCNC: 140 MMOL/L (ref 138–145)
WBC # BLD AUTO: 15.9 K/UL (ref 4.3–11.1)

## 2021-05-13 PROCEDURE — 97530 THERAPEUTIC ACTIVITIES: CPT

## 2021-05-13 PROCEDURE — 65270000029 HC RM PRIVATE

## 2021-05-13 PROCEDURE — 97161 PT EVAL LOW COMPLEX 20 MIN: CPT

## 2021-05-13 PROCEDURE — 82962 GLUCOSE BLOOD TEST: CPT

## 2021-05-13 PROCEDURE — 74011250637 HC RX REV CODE- 250/637: Performed by: FAMILY MEDICINE

## 2021-05-13 PROCEDURE — 74011250636 HC RX REV CODE- 250/636: Performed by: HOSPITALIST

## 2021-05-13 PROCEDURE — 74011000258 HC RX REV CODE- 258: Performed by: HOSPITALIST

## 2021-05-13 PROCEDURE — 74011250637 HC RX REV CODE- 250/637: Performed by: HOSPITALIST

## 2021-05-13 PROCEDURE — 94640 AIRWAY INHALATION TREATMENT: CPT

## 2021-05-13 PROCEDURE — 74011636637 HC RX REV CODE- 636/637: Performed by: HOSPITALIST

## 2021-05-13 PROCEDURE — 36415 COLL VENOUS BLD VENIPUNCTURE: CPT

## 2021-05-13 PROCEDURE — 87070 CULTURE OTHR SPECIMN AEROBIC: CPT

## 2021-05-13 PROCEDURE — 80048 BASIC METABOLIC PNL TOTAL CA: CPT

## 2021-05-13 PROCEDURE — 97165 OT EVAL LOW COMPLEX 30 MIN: CPT

## 2021-05-13 PROCEDURE — 2709999900 HC NON-CHARGEABLE SUPPLY

## 2021-05-13 PROCEDURE — 85025 COMPLETE CBC W/AUTO DIFF WBC: CPT

## 2021-05-13 PROCEDURE — 94760 N-INVAS EAR/PLS OXIMETRY 1: CPT

## 2021-05-13 RX ORDER — DIPHENHYDRAMINE HCL 25 MG
25 CAPSULE ORAL ONCE
Status: ACTIVE | OUTPATIENT
Start: 2021-05-13 | End: 2021-05-14

## 2021-05-13 RX ADMIN — CLOPIDOGREL BISULFATE 75 MG: 75 TABLET ORAL at 08:57

## 2021-05-13 RX ADMIN — Medication 10 ML: at 21:17

## 2021-05-13 RX ADMIN — INSULIN LISPRO 3 UNITS: 100 INJECTION, SOLUTION INTRAVENOUS; SUBCUTANEOUS at 17:37

## 2021-05-13 RX ADMIN — INSULIN LISPRO 3 UNITS: 100 INJECTION, SOLUTION INTRAVENOUS; SUBCUTANEOUS at 06:18

## 2021-05-13 RX ADMIN — ROSUVASTATIN 10 MG: 10 TABLET, FILM COATED ORAL at 21:16

## 2021-05-13 RX ADMIN — MONTELUKAST 10 MG: 10 TABLET, FILM COATED ORAL at 21:16

## 2021-05-13 RX ADMIN — INSULIN LISPRO 3 UNITS: 100 INJECTION, SOLUTION INTRAVENOUS; SUBCUTANEOUS at 12:29

## 2021-05-13 RX ADMIN — Medication 10 ML: at 05:41

## 2021-05-13 RX ADMIN — TEMAZEPAM 15 MG: 15 CAPSULE ORAL at 21:16

## 2021-05-13 RX ADMIN — INSULIN LISPRO 2 UNITS: 100 INJECTION, SOLUTION INTRAVENOUS; SUBCUTANEOUS at 21:17

## 2021-05-13 RX ADMIN — CEFEPIME 1 G: 1 INJECTION, POWDER, FOR SOLUTION INTRAMUSCULAR; INTRAVENOUS at 15:18

## 2021-05-13 RX ADMIN — DOXYCYCLINE HYCLATE 100 MG: 100 CAPSULE ORAL at 21:16

## 2021-05-13 RX ADMIN — ASPIRIN 81 MG: 81 TABLET, CHEWABLE ORAL at 21:16

## 2021-05-13 RX ADMIN — RDII 250 MG CAPSULE 250 MG: at 08:57

## 2021-05-13 RX ADMIN — FLUTICASONE PROPIONATE 2 SPRAY: 50 SPRAY, METERED NASAL at 08:57

## 2021-05-13 RX ADMIN — BUDESONIDE AND FORMOTEROL FUMARATE DIHYDRATE 2 PUFF: 80; 4.5 AEROSOL RESPIRATORY (INHALATION) at 08:55

## 2021-05-13 RX ADMIN — RDII 250 MG CAPSULE 250 MG: at 17:40

## 2021-05-13 RX ADMIN — CEFEPIME 1 G: 1 INJECTION, POWDER, FOR SOLUTION INTRAMUSCULAR; INTRAVENOUS at 02:13

## 2021-05-13 RX ADMIN — Medication 10 ML: at 15:09

## 2021-05-13 RX ADMIN — DOXYCYCLINE HYCLATE 100 MG: 100 CAPSULE ORAL at 08:57

## 2021-05-13 RX ADMIN — BUDESONIDE AND FORMOTEROL FUMARATE DIHYDRATE 2 PUFF: 80; 4.5 AEROSOL RESPIRATORY (INHALATION) at 20:08

## 2021-05-13 RX ADMIN — MAGNESIUM GLUCONATE 500 MG ORAL TABLET 400 MG: 500 TABLET ORAL at 08:57

## 2021-05-13 NOTE — PROGRESS NOTES
ACUTE PHYSICAL THERAPY GOALS:  (Developed with and agreed upon by patient and/or caregiver. )  LTG:  (1.)Mr. Ney Nicole will move from supine to sit and sit to supine, scoot up and down and roll side to side in bed INDEPENDENTLY with bed flat within 7 treatment day(s). (2.)Mr. Ney Nicole will transfer from bed to chair and chair to bed INDEPENDENTLY within 7 treatment day(s). (3.)Mr. Ney Nicole will ambulate with SUPERVISION for 450+ feet with the least restrictive device within 7 treatment day(s). ________________________________________________________________________________________________      PHYSICAL THERAPY ASSESSMENT: Initial Assessment, Discharge and AM PT Treatment Day # 1      Ana Noble is a 67 y.o. male   PRIMARY DIAGNOSIS: CAP (community acquired pneumonia)  Pneumonia [J18.9]       Reason for Referral:  Weakness, pneumonia  ICD-10: Treatment Diagnosis: Generalized Muscle Weakness (M62.81)  Difficulty in walking, Not elsewhere classified (R26.2)  Other abnormalities of gait and mobility (R26.89)  [place]PAT[place] / [place] B / Product Type: Medicare /     ASSESSMENT:     REHAB RECOMMENDATIONS:   Recommendation to date pending progress:  Setting:   No further skilled therapy   Equipment:    None     PRIOR LEVEL OF FUNCTION:  (Prior to Hospitalization) INITIAL/CURRENT LEVEL OF FUNCTION:  (Most Recently Demonstrated)   Bed Mobility:   Independent  Sit to Stand:   Independent  Transfers:   Independent  Gait/Mobility:   Independent Bed Mobility:   Independent  Sit to Stand:   Independent  Transfers:   Independent  Gait/Mobility:   Independent     ASSESSMENT:  Mr. Ney Nicole is admitted from home with pneumonia. He lives with spouse and is fully independent at baseline, feels has never recovered 100% after his hospitalization with COVID, however is ambulatory without DME use. Has no complaints today. Performs transfers independently.  Ambulatory in room and hallway x 450 ft with supervision-independence. On room air, no major gait deficits. No further therapy needs during hospital stay, all goals met. SUBJECTIVE:   Mr. Marquise Bell is pleasant and talkative    SOCIAL HISTORY/LIVING ENVIRONMENT: Lives with wife. Independent baseline.    Home Environment: Private residence  # Steps to Enter: 1  One/Two Story Residence: One story  Living Alone: No  Support Systems: Family member(s)  OBJECTIVE:     PAIN: VITAL SIGNS: LINES/DRAINS:   Pre Treatment:    Post Treatment: 0/10   none  O2 Device: None (Room air)     GROSS EVALUATION:   Within Functional Limits Abnormal/ Functional Abnormal/ Non-Functional (see comments) Not Tested Comments:   AROM [x] [] [] []    PROM [] [] [] []    Strength [x] [] [] []    Balance [x] [] [] []    Posture [x] [] [] []    Sensation [] [] [] []    Coordination [] [] [] []    Tone [] [] [] []    Edema [] [] [] []    Activity Tolerance [x] [] [] []     [] [] [] []      COGNITION/  PERCEPTION: Intact Impaired   (see comments) Comments:   Orientation [x] []    Vision [x] []    Hearing [x] []    Command Following [x] []    Safety Awareness [x] []     [] []      MOBILITY: I Mod I S SBA CGA Min Mod Max Total  NT x2 Comments:   Bed Mobility    Rolling [x] [] [] [] [] [] [] [] [] [] []    Supine to Sit [x] [] [] [] [] [] [] [] [] [] []    Scooting [x] [] [] [] [] [] [] [] [] [] []    Sit to Supine [x] [] [] [] [] [] [] [] [] [] []    Transfers    Sit to Stand [x] [] [] [] [] [] [] [] [] [] []    Bed to Chair [x] [] [] [] [] [] [] [] [] [] []    Stand to Sit [x] [] [] [] [] [] [] [] [] [] []    I=Independent, Mod I=Modified Independent, S=Supervision, SBA=Standby Assistance, CGA=Contact Guard Assistance,   Min=Minimal Assistance, Mod=Moderate Assistance, Max=Maximal Assistance, Total=Total Assistance, NT=Not Tested  GAIT: I Mod I S SBA CGA Min Mod Max Total  NT x2 Comments:   Level of Assistance [x] [] [] [] [] [] [] [] [] [] []    Distance 450 ft    DME None    Gait Quality No major deficits    Weightbearing Status N/A     I=Independent, Mod I=Modified Independent, S=Supervision, SBA=Standby Assistance, CGA=Contact Guard Assistance,   Min=Minimal Assistance, Mod=Moderate Assistance, Max=Maximal Assistance, Total=Total Assistance, NT=Not [facility]       How much difficulty does the patient currently have. .. Unable A Lot A Little None   1. Turning over in bed (including adjusting bedclothes, sheets and blankets)? [] 1   [] 2   [] 3   [x] 4   2. Sitting down on and standing up from a chair with arms ( e.g., wheelchair, bedside commode, etc.)   [] 1   [] 2   [] 3   [x] 4   3. Moving from lying on back to sitting on the side of the bed? [] 1   [] 2   [] 3   [x] 4   How much help from another person does the patient currently need. .. Total A Lot A Little None   4. Moving to and from a bed to a chair (including a wheelchair)? [] 1   [] 2   [] 3   [x] 4   5. Need to walk in hospital room? [] 1   [] 2   [] 3   [x] 4   6. Climbing 3-5 steps with a railing? [] 1   [] 2   [] 3   [x] 4   © 2007, Trustees of [address] [place] [address], under license to [employer]. All rights reserved     Score:  Initial: 24 Most Recent: X (Date: -- )    Interpretation of Tool:  Represents activities that are increasingly more difficult (i.e. Bed mobility, Transfers, Gait). PLAN:   FREQUENCY/DURATION: PT Plan of Care: (eval and dc) for duration of hospital stay or until stated goals are met, whichever comes first.    PROBLEM LIST:   (Skilled intervention is medically necessary to address:)  1. none   INTERVENTIONS PLANNED:   (Benefits and precautions of physical therapy have been discussed with the patient.)  1. none     TREATMENT:     EVALUATION: Low Complexity : (Untimed Charge)    TREATMENT:   ($$ Therapeutic Activity: 8-22 mins    )  Therapeutic Activity (8 Minutes):  Therapeutic activity included Rolling, Supine to Sit, Scooting, Transfer Training, Ambulation on level ground, Sitting balance  and Standing balance to improve functional Mobility, Strength, Activity tolerance and balance.     TREATMENT GRID:  N/A    AFTER TREATMENT POSITION/PRECAUTIONS:  Chair, Needs within reach and RN notified    INTERDISCIPLINARY COLLABORATION:  RN/PCT and PT/PTA    TOTAL TREATMENT DURATION:  PT Patient Time In/Time Out  Time In: 1009  Time Out: [address], DPT

## 2021-05-13 NOTE — PROGRESS NOTES
Alert and oriented X4. Patient on room air. Maintaining O2 sat above 90%. IV fluid infusing. Denies any distress. Patient is encouraged to call for assistance.

## 2021-05-13 NOTE — PROGRESS NOTES
[facility] Hospitalist Progress Note     Name:  Luci Wu  Age:76 y.o. Sex:male   :  1945    MRN:  [id number]     Admit Date:  2021    Reason for Admission:  Pneumonia [J18.9]    Hospital Course/Interval history:     Luci Wu is a 67 y.o. male with medical history of  history of COPD (not on oxygen at home) presents to the emergency department with reports of altered mental status, fever and concern for sepsis.  Patient states he began feeling poorly this morning at approximately 6 AM.  This progressed throughout the day, his wife was at bedside states he was acting normally when she left around to run errands in the AM.  When she returned, patient was acting oddly and confused.  EMS was called, fever of 103 per report. [facility] was also tachycardic with multifocal PVCs consistent with bigeminy.  Patient was given a lidocaine push IV and started on a drip for this issue.  Sepsis protocol was initiated however given the lack of source, patient received no medication for sepsis.   Sats durations of 88% per EMS providers.  Of note, patient was diagnosed with COVID-19 earlier this year and required extended hospital and rehab stay lasting approximate 40 days.  Patient has been at home and feeling well up until today. [facility] has been vaccinated with both vaccines, completed his course last month.     In ER he had CXR with subtle bilateral infiltrates, WBC elevated at 14K, but lactic acid 1.5 and procal <0.05. He subsequently received IV abx and IVF in ER. Hospitalist admitted with CAP. Subjective (21): Patient sitting in chair, pleasant and conversational, on RA, no complaints, denies chest pain and SOB, he feels much improvement and anticipates returning home. Review of Systems: 14 point review of systems is otherwise negative with the exception of the elements mentioned above.   Assessment & Plan     Acute metabolic encephalopathy, resolved  CT head unremearkable  Avoid sedating meds  Fall precautions  Delirium precautions  PT/OT to eval and treat  Infection management as stated     Sepsis  CAP  Meets sepsis criteria with WBC>12K, T>100.9 on admission. Meets qSOFA of 3 with AMS, Rr>22, SBP<100. LA wnl. Most likely 2/2 PNA. CXR shows overall improvement in B/L opacities with mild bibasilar atelectasis/infiltrates are now present. Abx: cefepime/doxy (5/12-. ..). D/c Vanc d/t MRSA neg and add doxy for atypical coverage  Probiotics  MRSA negative, procal negative  BCx 5/11: pending  SpCx: pending  5/13 sputum and blood cultures remain pending, anticipate d/c home on PO Doxy once cultures finalize     Leukocytosis  2/2 infection and steroids  CTM  5/13 WBC trending down to 15.9     Respiratory failure with hypoxia, resolved  Most likely 2/2 CAP vs acute COPD exacerbations  CXR shows overall improvement in B/L opacities with mild bibasilar atelectasis/infiltrates are now present. Febrile with WBC>12K on admission.  Received Solumedrol, Vanc, Ceftrixone and Azithro in ED  Cont abx per above  Holding further steroids as no more wheezing on exam  Weaned down to RA this afternoon  5/13 Patient comfortable on RA, no SOB, no wheezing on exam     DM 2  Holding home Metformin, Januvia, glipizide  Continue SSI  Diabetic diet     CAD s/p PCI w/ stents  Continue home aspirin and Plavix, statin     COPD  Continue home butyryl, Advair, Flonase     HLD  Continue home statin     NOMI  CPAP nightly     HTN  Holding home amlodipine-valsartan as BP on the low end of normal  5/13 BP normalized today, continue to monitor, may restart home meds if remains stable        Diet:  DIET DIABETIC CONSISTENT CARB  DVT PPx: SCD's  Code status: Full Code  Disposition/Expected LOS: anticipate d/c in 1-2 days once cultures negative,blood and sputum cultures still pending      Objective:     Patient Vitals for the past 24 hrs:   Temp Pulse Resp BP SpO2   05/13/21 1138 97.8 °F (36.6 °C) 76 18 124/68 95 %   05/13/21 0855 -- -- -- -- 93 %   05/13/21 0738 97.9 °F (36.6 °C) 76 18 120/72 95 %   05/13/21 0345 98 °F (36.7 °C) 80 17 (!) 111/55 96 %   05/12/21 2337 97.5 °F (36.4 °C) 73 18 132/71 96 %   05/12/21 2016 97.5 °F (36.4 °C) 81 17 119/63 94 %   05/12/21 2002 -- -- -- -- 94 %   05/12/21 1417 -- -- -- -- 93 %     Oxygen Therapy  O2 Sat (%): 95 % (05/13/21 1138)  Pulse via Oximetry: 69 beats per minute (05/13/21 0855)  O2 Device: None (Room air) (05/13/21 1009)  O2 Flow Rate (L/min): 2 l/min (05/12/21 1331)  FIO2 (%): 32 % (05/12/21 0905)    Body mass index is 34.7 kg/m². Physical Exam:   General:  No acute distress, speaking in full sentences, pleasant and conversational   HEENT: PERRLA, no JVD  Lungs:  Clear to auscultation bilaterally, diminished in bilateral bases  CV:  Regular rate and rhythm with normal S1 and S2   Abdomen:  Obese, yet soft, nontender, nondistended, normoactive bowel sounds   Extremities:  No cyanosis clubbing or edema   Neuro:   Nonfocal, A&O x3   Psych:  Normal affect     Data Review:  I have reviewed all labs, meds, and studies from the last 24 hours:    Labs:    Recent Results (from the past 24 hour(s))   MSSA/MRSA SC BY PCR, NASAL SWAB    Collection Time: 05/12/21  1:49 PM    Specimen: Nasal swab    SWAB   Result Value Ref Range    Special Requests: NO SPECIAL REQUESTS      Culture result:        SA target not detected. A MRSA NEGATIVE, SA NEGATIVE test result does not preclude MRSA or SA nasal colonization.    GLUCOSE, POC    Collection Time: 05/12/21  4:09 PM   Result Value Ref Range    Glucose (POC) 169 (H) 65 - 100 mg/dL    Performed by AvelinaitaPCT    GLUCOSE, POC    Collection Time: 05/12/21 10:01 PM   Result Value Ref Range    Glucose (POC) 187 (H) 65 - 100 mg/dL    Performed by Justus    GLUCOSE, POC    Collection Time: 05/13/21  6:06 AM   Result Value Ref Range    Glucose (POC) 151 (H) 65 - 100 mg/dL    Performed by Miranda    CBC WITH AUTOMATED DIFF Collection Time: 05/13/21  6:35 AM   Result Value Ref Range    WBC 15.9 (H) 4.3 - 11.1 K/uL    RBC 3.38 (L) 4.23 - 5.6 M/uL    HGB 10.6 (L) 13.6 - 17.2 g/dL    HCT 32.5 (L) 41.1 - 50.3 %    MCV 96.2 79.6 - 97.8 FL    MCH 31.4 26.1 - 32.9 PG    MCHC 32.6 31.4 - 35.0 g/dL    RDW 15.9 (H) 11.9 - 14.6 %    PLATELET 710 401 - 204 K/uL    MPV 10.3 9.4 - 12.3 FL    ABSOLUTE NRBC 0.00 0.0 - 0.2 K/uL    DF AUTOMATED      NEUTROPHILS 81 (H) 43 - 78 %    LYMPHOCYTES 11 (L) 13 - 44 %    MONOCYTES 7 4.0 - 12.0 %    EOSINOPHILS 0 (L) 0.5 - 7.8 %    BASOPHILS 0 0.0 - 2.0 %    IMMATURE GRANULOCYTES 1 0.0 - 5.0 %    ABS. NEUTROPHILS 12.9 (H) 1.7 - 8.2 K/UL    ABS. LYMPHOCYTES 1.7 0.5 - 4.6 K/UL    ABS. MONOCYTES 1.1 0.1 - 1.3 K/UL    ABS. EOSINOPHILS 0.0 0.0 - 0.8 K/UL    ABS. BASOPHILS 0.0 0.0 - 0.2 K/UL    ABS. IMM.  GRANS. 0.1 0.0 - 0.5 K/UL   METABOLIC PANEL, BASIC    Collection Time: 05/13/21  6:35 AM   Result Value Ref Range    Sodium 140 138 - 145 mmol/L    Potassium 3.9 3.5 - 5.1 mmol/L    Chloride 107 98 - 107 mmol/L    CO2 25 21 - 32 mmol/L    Anion gap 8 7 - 16 mmol/L    Glucose 154 (H) 65 - 100 mg/dL    BUN 16 8 - 23 MG/DL    Creatinine 0.88 0.8 - 1.5 MG/DL    GFR est AA >60 >60 ml/min/1.73m2    GFR est non-AA >60 >60 ml/min/1.73m2    Calcium 8.7 8.3 - 10.4 MG/DL   GLUCOSE, POC    Collection Time: 05/13/21 11:38 AM   Result Value Ref Range    Glucose (POC) 194 (H) 65 - 100 mg/dL    Performed by Radha Villa        All Micro Results     Procedure Component Value Units Date/Time    CULTURE, RESPIRATORY/SPUTUM/BRONCH Don Hung [[id number]] Collected: 05/13/21 8962    Order Status: Completed Specimen: Sputum Updated: 05/13/21 1027    BLOOD CULTURE [[id number]] Collected: 05/11/21 1950    Order Status: Completed Specimen: Blood Updated: 05/13/21 0755     Special Requests: RIGHT ANTECUBITAL        Culture result: NO GROWTH 2 DAYS       MSSA/MRSA SC BY PCR, NASAL SWAB [[id number]] Collected: 05/12/21 1349    Order Status: Completed Specimen: Nasal swab Updated: 05/12/21 1532     Special Requests: NO SPECIAL REQUESTS        Culture result:       SA target not detected. A MRSA NEGATIVE, SA NEGATIVE test result does not preclude MRSA or SA nasal colonization. BLOOD CULTURE [[id number]]     Order Status: Sent Specimen: Blood           EKG Results     Procedure 720 Value Units Date/Time    EKG, 12 LEAD, INITIAL [[id number]] Collected: 05/11/21 1948    Order Status: Completed Updated: 05/12/21 0716     Ventricular Rate 110 BPM      Atrial Rate 110 BPM      P-R Interval 174 ms      QRS Duration 88 ms      Q-T Interval 316 ms      QTC Calculation (Bezet) 427 ms      Calculated P Axis 94 degrees      Calculated R Axis 73 degrees      Calculated T Axis 53 degrees      Diagnosis --     !! AGE AND GENDER SPECIFIC ECG ANALYSIS !! Sinus tachycardia with frequent and consecutive Premature ventricular   complexes and Fusion complexes  Nonspecific ST abnormality  Abnormal ECG  When compared with ECG of 31-DEC-2020 01:24,  Fusion complexes are now Present  Premature supraventricular complexes are no longer Present  IN interval has decreased  QRS axis Shifted left  Confirmed by Lenny Higuera MD (), OSIEL WASHINGTON ([id number]) on 5/12/2021 7:16:19 AM            Other Studies:  No results found.     Current Meds:   Current Facility-Administered Medications   Medication Dose Route Frequency    sodium chloride (NS) flush 5-40 mL  5-40 mL IntraVENous Q8H    sodium chloride (NS) flush 5-40 mL  5-40 mL IntraVENous PRN    cefepime (MAXIPIME) 1 g in 0.9% sodium chloride (MBP/ADV) 50 mL MBP  1 g IntraVENous Q12H    albuterol (PROVENTIL VENTOLIN) nebulizer solution 2.5 mg  2.5 mg Nebulization Q4H PRN    aspirin chewable tablet 81 mg  81 mg Oral QHS    clopidogreL (PLAVIX) tablet 75 mg  75 mg Oral DAILY    budesonide-formoterol (SYMBICORT) 80-4.5 mcg inhaler  2 Puff Inhalation BID RT    fluticasone propionate (FLONASE) 50 mcg/actuation nasal spray 2 Spray  2 Spray Both Nostrils DAILY    montelukast (SINGULAIR) tablet 10 mg  10 mg Oral QHS    magnesium oxide (MAG-OX) tablet 400 mg  400 mg Oral DAILY    guaiFENesin ER (MUCINEX) tablet 1,200 mg  1,200 mg Oral BID PRN    rosuvastatin (CRESTOR) tablet 10 mg  10 mg Oral QHS    acetaminophen (TYLENOL) tablet 650 mg  650 mg Oral Q6H PRN    alum-mag hydroxide-simeth (MYLANTA) oral suspension 30 mL  30 mL Oral Q4H PRN    temazepam (RESTORIL) capsule 15 mg  15 mg Oral QHS PRN    insulin lispro (HUMALOG) injection   SubCUTAneous AC&HS    doxycycline (VIBRAMYCIN) capsule 100 mg  100 mg Oral Q12H    Saccharomyces boulardii (FLORASTOR) capsule 250 mg  250 mg Oral BID       Problem List:  Hospital Problems as of 5/13/2021 Date Reviewed: 3/15/2021          Codes Class Noted - Resolved POA    Acute metabolic encephalopathy [facility]-CB: G93.41  ICD-9-CM: 348.31  5/12/2021 - Present Unknown        * (Principal) CAP (community acquired pneumonia) ICD-10-CM: J18.9  ICD-9-CM: 001  12/31/2020 - Present         Type 2 diabetes with nephropathy ([facility] ICD-10-CM: E11.21  ICD-9-CM: 250.40, 583.81  6/17/2019 - Present Yes        Sepsis ([facility] ICD-10-CM: A41.9  ICD-9-CM: 038.9, 995.91  2/8/2019 - Present Unknown        Pulmonary emphysema ([facility] ICD-10-CM: J43.9  ICD-9-CM: 492.8  11/17/2016 - Present Yes        Hyperlipidemia ICD-10-CM: E78.5  ICD-9-CM: 272.4  11/10/2015 - Present Yes        COPD (chronic obstructive pulmonary disease) (HCC) ICD-10-CM: J44.9  ICD-9-CM: 496  4/10/2015 - Present Yes        Acute respiratory failure with hypoxia (HCC) ICD-10-CM: J96.01  ICD-9-CM: 518.81  10/23/2014 - Present Yes        Coronary artery disease involving native coronary artery of native heart without angina pectoris ICD-10-CM: I25.10  ICD-9-CM: 414.01  10/12/2011 - Present Yes        HTN (hypertension) (Chronic) ICD-10-CM: I10  ICD-9-CM: 401.9  10/12/2011 - Present Yes        Sleep apnea (Chronic) ICD-10-CM: G47.30  ICD-9-CM: 780.57  10/12/2011 - Present Yes        Morbid obesity ([address] (Chronic) ICD-10-CM: E66.01  ICD-9-CM: 278.01  10/12/2011 - Present Yes             Labs, vital signs, diagnostic testing viewed. Case discussed with patient and care team.    Part of this note was written by using a voice dictation software and the note has been proof read but may still contain some grammatical/other typographical errors. Signed By: Cristina Huerta NP   [address] [place] [address] Service    May 13, 2021  5:15 PM        * Addendum:   I agree with findings and plan as above.      Signed be Ava Sheppard MD  05/13/21 5:18 PM

## 2021-05-13 NOTE — PROGRESS NOTES
Patient c/o sleep deprivation. Patient also c/o visual disturbances when he closes his eyes. States he sees stuff moving when he closes his eyes every since last night. Patient states he believes it is because of the medication that he has been pumped. This RN explained the visual disturbances may be caused by sleep deprivation. Angie Snyder, WAN, notified of the above. NP states she will come and speak to the patient. Patient is hesitant on taking rocephin IV at this time.

## 2021-05-13 NOTE — PROGRESS NOTES
Patient awake resting in recliner. Respirations present. On room air. No signs of distress. AxO x4. No needs expressed. Recliner wheels locked. Call light within reach. Family present in room. Preparing to give bedside report to oncoming RN.

## 2021-05-13 NOTE — PROGRESS NOTES
ACUTE OT GOALS:  (Developed with and agreed upon by patient and/or caregiver.)  NA. Evaluate and discharge from OT services. OCCUPATIONAL THERAPY ASSESSMENT: Initial Assessment and Discharge OT Treatment Day Ronaldo Leigh is a 67 y.o. male   PRIMARY DIAGNOSIS: CAP (community acquired pneumonia)  Pneumonia [J18.9]       Reason for Referral:    ICD-10: Treatment Diagnosis: Generalized Muscle Weakness (M62.81)  INPATIENT: [place] / [place] A [place] B / Product Type: Medicare /   ASSESSMENT:     REHAB RECOMMENDATIONS:   Recommendation to date pending progress:  Setting:   No further skilled therapy   Equipment:    None     PRIOR LEVEL OF FUNCTION:  (Prior to Hospitalization)  INITIAL/CURRENT LEVEL OF FUNCTION:  (Based on today's evaluation)   Bathing:   Independent  Dressing:   Independent  Feeding/Grooming:   Independent  Toileting:   Independent  Functional Mobility:   Independent Bathing:   Supervision  Dressing:   Independent  Feeding/Grooming:   Independent  Toileting:   Independent  Functional Mobility:   Independent     ASSESSMENT:  Mr. Kiersten Hoover presents with AMS, fever, CAP. At baseline pt lives with wife, completes I/ADLs, ambulates, and drives with independence. Pt practiced functional transfers and ambulation with SBA progressing to supervision-independence without AD. Reports he has been up ad annie in room and has no OT needs. Pt is currently functioning at/near baseline for ADLs. No indication for skilled OT at this time. Will d/c.      SUBJECTIVE:   Mr. Kiersten Hoover states, \"I reddy hog. \"    SOCIAL HISTORY/LIVING ENVIRONMENT: At baseline pt lives with wife, completes I/ADLs, ambulates, and drives with independence.     Home Environment: Private residence  # Steps to Enter: 1  One/Two Story Residence: One story  Living Alone: No  Support Systems: Family member(s)    OBJECTIVE:     PAIN: VITAL SIGNS: LINES/DRAINS:   Pre Treatment: Pain Screen  Pain Scale 1: Numeric (0 - 10)  Pain Intensity 1: 0  Post Treatment: same Vital Signs  O2 Device: None (Room air)   O2 Device: None (Room air)     GROSS EVALUATION:  BUEs Within Functional Limits Abnormal/ Functional Abnormal/ Non-Functional (see comments) Not Tested Comments:   AROM [x] [] [] []    PROM [] [] [] []    Strength [] [x] [] []    Balance [] [x] [] []    Posture [x] [] [] []    Sensation [] [] [] []    Coordination [x] [] [] []    Tone [] [] [] []    Edema [x] [] [] []    Activity Tolerance [] [x] [] [] Generally decreased 2° hx COVID.  Defer to PT    [] [] [] []      COGNITION/  PERCEPTION: Intact Impaired   (see comments) Comments:   Orientation [x] []    Vision [x] []    Hearing [x] []    Judgment/ Insight [x] []    Attention [x] []    Memory [] []    Command Following [x] []    Emotional Regulation [x] []     [] []      ACTIVITIES OF DAILY LIVING: I Mod I S SBA CGA Min Mod Max Total NT Comments   BASIC ADLs:              Bathing/ Showering [] [] [] [] [] [] [] [] [] [x]    Toileting [] [] [] [] [] [] [] [] [] [x]    Dressing [] [] [] [] [] [] [] [] [] [x]    Feeding [] [] [] [] [] [] [] [] [] [x]    Grooming [] [] [] [] [] [] [] [] [] [x]    Personal Device Care [] [] [] [] [] [] [] [] [] [x]    Functional Mobility [x] [] [x] [] [] [] [] [] [] []    I=Independent, Mod I=Modified Independent, S=Supervision, SBA=Standby Assistance, CGA=Contact Guard Assistance,   Min=Minimal Assistance, Mod=Moderate Assistance, Max=Maximal Assistance, Total=Total Assistance, NT=Not Tested    MOBILITY: I Mod I S SBA CGA Min Mod Max Total  NT x2 Comments:   Supine to sit [] [] [] [] [] [] [] [] [] [x] []    Sit to supine [] [] [] [] [] [] [] [] [] [x] []    Sit to stand [x] [] [] [] [] [] [] [] [] [] []    Bed to chair [x] [] [] [] [] [] [] [] [] [] [] No AD   I=Independent, Mod I=Modified Independent, S=Supervision, SBA=Standby Assistance, CGA=Contact Guard Assistance,   Min=Minimal Assistance, Mod=Moderate Assistance, Max=Maximal Assistance, Total=Total Assistance, NT=Not Tested    [employer] 6 Clicks   Daily Activity Inpatient Short Form        How much help from another person does the patient currently need. .. Total A Lot A Little None   1. Putting on and taking off regular lower body clothing? [] 1   [] 2   [] 3   [x] 4   2. Bathing (including washing, rinsing, drying)? [] 1   [] 2   [x] 3   [] 4   3. Toileting, which includes using toilet, bedpan or urinal?   [] 1   [] 2   [] 3   [x] 4   4. Putting on and taking off regular upper body clothing? [] 1   [] 2   [] 3   [x] 4   5. Taking care of personal grooming such as brushing teeth? [] 1   [] 2   [] 3   [x] 4   6. Eating meals? [] 1   [] 2   [] 3   [x] 4   © 2007, Trustees of [facility] [employer], under license to [employer]. All rights reserved     Score:  Initial: 23 5/13/21 Most Recent: X (Date: -- )   Interpretation of Tool:  Represents activities that are increasingly more difficult (i.e. Bed mobility, Transfers, Gait). PLAN:   FREQUENCY/DURATION: OT Plan of Care: (Eval & d/c) for duration of hospital stay or until stated goals are met, whichever comes first.    PROBLEM LIST:   (Skilled intervention is medically necessary to address:)  1. Decreased Activity Tolerance  2. Decreased Balance     Defer to PT   INTERVENTIONS PLANNED:   (Benefits and precautions of occupational therapy have been discussed with the patient.)  1. Education     TREATMENT:     EVALUATION: Low Complexity : (Untimed Charge)    TREATMENT:   (     )  NA   Co-Treatment PT/OT appropriate due to patient's decreased overall endurance/tolerance levels, as well as ability to benefit/learn from both disciplines for functional transfers/mobilty and functional tasks, with PT addressing balance while OT addressing ADLs.     TREATMENT GRID:  N/A    AFTER TREATMENT POSITION/PRECAUTIONS:  with PT    INTERDISCIPLINARY COLLABORATION:  RN/PCT, PT/PTA and OT/GUZMAN    TOTAL TREATMENT DURATION:  OT Patient Time In/Time Out  Time In: 1009  Time Out: 1001 [facility], OTR/L

## 2021-05-13 NOTE — PROGRESS NOTES
KAUR FRANCIS:  Received patient yesterday afternoon. Spoke with patient which appears to be doing well. Patient voices no needs at this time. Case Management will continue to follow for any discharge needs that may arise.

## 2021-05-13 NOTE — PROGRESS NOTES
Patient has home CPAP. Patient is able to place machine on hisself. Instructed patient to call if he needed anything.

## 2021-05-13 NOTE — PROGRESS NOTES
Received report from Loki Hernández RN. Patient awake resting in recliner. Respirations present. On room air. No signs of distress. AxO x4. No needs expressed. Recliner wheels locked. Call light within reach. Will continue to monitor.

## 2021-05-13 NOTE — PROGRESS NOTES
Patient slept intermittently. Respirations are even and unlabored. Patient on cpap machine. Urinating fine. Blood glucose covered per orders . Bed is low, locked and call light within reach.

## 2021-05-14 VITALS
WEIGHT: 259.5 LBS | TEMPERATURE: 98.6 F | BODY MASS INDEX: 34.39 KG/M2 | HEART RATE: 67 BPM | DIASTOLIC BLOOD PRESSURE: 75 MMHG | HEIGHT: 73 IN | SYSTOLIC BLOOD PRESSURE: 128 MMHG | OXYGEN SATURATION: 94 % | RESPIRATION RATE: 17 BRPM

## 2021-05-14 LAB
ANION GAP SERPL CALC-SCNC: 5 MMOL/L (ref 7–16)
BASOPHILS # BLD: 0 K/UL (ref 0–0.2)
BASOPHILS NFR BLD: 0 % (ref 0–2)
BUN SERPL-MCNC: 14 MG/DL (ref 8–23)
CALCIUM SERPL-MCNC: 8.7 MG/DL (ref 8.3–10.4)
CHLORIDE SERPL-SCNC: 106 MMOL/L (ref 98–107)
CO2 SERPL-SCNC: 27 MMOL/L (ref 21–32)
CREAT SERPL-MCNC: 0.79 MG/DL (ref 0.8–1.5)
DIFFERENTIAL METHOD BLD: ABNORMAL
EOSINOPHIL # BLD: 0 K/UL (ref 0–0.8)
EOSINOPHIL NFR BLD: 0 % (ref 0.5–7.8)
ERYTHROCYTE [DISTWIDTH] IN BLOOD BY AUTOMATED COUNT: 15.5 % (ref 11.9–14.6)
GLUCOSE BLD STRIP.AUTO-MCNC: 164 MG/DL (ref 65–100)
GLUCOSE SERPL-MCNC: 165 MG/DL (ref 65–100)
HCT VFR BLD AUTO: 32 % (ref 41.1–50.3)
HGB BLD-MCNC: 10.7 G/DL (ref 13.6–17.2)
IMM GRANULOCYTES # BLD AUTO: 0.1 K/UL (ref 0–0.5)
IMM GRANULOCYTES NFR BLD AUTO: 1 % (ref 0–5)
LYMPHOCYTES # BLD: 1.1 K/UL (ref 0.5–4.6)
LYMPHOCYTES NFR BLD: 11 % (ref 13–44)
MCH RBC QN AUTO: 31.2 PG (ref 26.1–32.9)
MCHC RBC AUTO-ENTMCNC: 33.4 G/DL (ref 31.4–35)
MCV RBC AUTO: 93.3 FL (ref 79.6–97.8)
MONOCYTES # BLD: 0.8 K/UL (ref 0.1–1.3)
MONOCYTES NFR BLD: 8 % (ref 4–12)
NEUTS SEG # BLD: 8.3 K/UL (ref 1.7–8.2)
NEUTS SEG NFR BLD: 80 % (ref 43–78)
NRBC # BLD: 0 K/UL (ref 0–0.2)
PLATELET # BLD AUTO: 180 K/UL (ref 150–450)
PMV BLD AUTO: 10.5 FL (ref 9.4–12.3)
POTASSIUM SERPL-SCNC: 3.9 MMOL/L (ref 3.5–5.1)
RBC # BLD AUTO: 3.43 M/UL (ref 4.23–5.6)
SERVICE CMNT-IMP: ABNORMAL
SODIUM SERPL-SCNC: 138 MMOL/L (ref 136–145)
WBC # BLD AUTO: 10.4 K/UL (ref 4.3–11.1)

## 2021-05-14 PROCEDURE — 82962 GLUCOSE BLOOD TEST: CPT

## 2021-05-14 PROCEDURE — 74011000258 HC RX REV CODE- 258: Performed by: HOSPITALIST

## 2021-05-14 PROCEDURE — 94640 AIRWAY INHALATION TREATMENT: CPT

## 2021-05-14 PROCEDURE — 74011250636 HC RX REV CODE- 250/636: Performed by: HOSPITALIST

## 2021-05-14 PROCEDURE — 74011250637 HC RX REV CODE- 250/637: Performed by: HOSPITALIST

## 2021-05-14 PROCEDURE — 85025 COMPLETE CBC W/AUTO DIFF WBC: CPT

## 2021-05-14 PROCEDURE — 80048 BASIC METABOLIC PNL TOTAL CA: CPT

## 2021-05-14 PROCEDURE — 94760 N-INVAS EAR/PLS OXIMETRY 1: CPT

## 2021-05-14 PROCEDURE — 74011250637 HC RX REV CODE- 250/637: Performed by: FAMILY MEDICINE

## 2021-05-14 PROCEDURE — 36415 COLL VENOUS BLD VENIPUNCTURE: CPT

## 2021-05-14 PROCEDURE — 74011636637 HC RX REV CODE- 636/637: Performed by: HOSPITALIST

## 2021-05-14 RX ORDER — DOXYCYCLINE 100 MG/1
100 CAPSULE ORAL 2 TIMES DAILY
Qty: 10 CAP | Refills: 0 | Status: SHIPPED | OUTPATIENT
Start: 2021-05-14 | End: 2021-05-20 | Stop reason: ALTCHOICE

## 2021-05-14 RX ADMIN — MAGNESIUM GLUCONATE 500 MG ORAL TABLET 400 MG: 500 TABLET ORAL at 08:26

## 2021-05-14 RX ADMIN — Medication 10 ML: at 05:54

## 2021-05-14 RX ADMIN — DOXYCYCLINE HYCLATE 100 MG: 100 CAPSULE ORAL at 08:26

## 2021-05-14 RX ADMIN — RDII 250 MG CAPSULE 250 MG: at 08:26

## 2021-05-14 RX ADMIN — CEFEPIME 1 G: 1 INJECTION, POWDER, FOR SOLUTION INTRAMUSCULAR; INTRAVENOUS at 05:54

## 2021-05-14 RX ADMIN — BUDESONIDE AND FORMOTEROL FUMARATE DIHYDRATE 2 PUFF: 80; 4.5 AEROSOL RESPIRATORY (INHALATION) at 07:15

## 2021-05-14 RX ADMIN — INSULIN LISPRO 2 UNITS: 100 INJECTION, SOLUTION INTRAVENOUS; SUBCUTANEOUS at 06:07

## 2021-05-14 RX ADMIN — CLOPIDOGREL BISULFATE 75 MG: 75 TABLET ORAL at 08:26

## 2021-05-14 RX ADMIN — FLUTICASONE PROPIONATE 2 SPRAY: 50 SPRAY, METERED NASAL at 08:26

## 2021-05-14 NOTE — PROGRESS NOTES
Patient discharged. AxO x4. No needs expressed. Discharged instructions provided to patient. No questions at this time. All lines removed. No signs of distress. Discharged via wheelchair by this RN. Discharged with all patient belongings.

## 2021-05-14 NOTE — DISCHARGE INSTRUCTIONS
Patient Education        Pneumonia: Care Instructions  Overview     Pneumonia is an infection of the lungs. Most cases are caused by infections from bacteria or viruses. Pneumonia may be mild or very severe. If it is caused by bacteria, you will be treated with antibiotics. It may take a few weeks to a few months to recover fully from pneumonia, depending on how sick you were and whether your overall health is good. Follow-up care is a key part of your treatment and safety. Be sure to make and go to all appointments, and call your doctor if you are having problems. It's also a good idea to know your test results and keep a list of the medicines you take. How can you care for yourself at home? · Take your antibiotics exactly as directed. Do not stop taking the medicine just because you are feeling better. You need to take the full course of antibiotics. · Take your medicines exactly as prescribed. Call your doctor if you think you are having a problem with your medicine. · Get plenty of rest and sleep. You may feel weak and tired for a while, but your energy level will improve with time. · To prevent dehydration, drink plenty of fluids, enough so that your urine is light yellow or clear like water. Choose water and other caffeine-free clear liquids until you feel better. If you have kidney, heart, or liver disease and have to limit fluids, talk with your doctor before you increase the amount of fluids you drink. · Take care of your cough so you can rest. A cough that brings up mucus from your lungs is common with pneumonia. It is one way your body gets rid of the infection. But if coughing keeps you from resting or causes severe fatigue and chest-wall pain, talk to your doctor. Your doctor may suggest that you take a medicine to reduce the cough. · Use a vaporizer or humidifier to add moisture to your bedroom. Follow the directions for cleaning the machine.   · Do not smoke or allow others to smoke around you. Smoke will make your cough last longer. If you need help quitting, talk to your doctor about stop-smoking programs and medicines. These can increase your chances of quitting for good. · Take an over-the-counter pain medicine, such as acetaminophen (Tylenol), ibuprofen (Advil, Motrin), or naproxen (Aleve). Read and follow all instructions on the label. · Do not take two or more pain medicines at the same time unless the doctor told you to. Many pain medicines have acetaminophen, which is Tylenol. Too much acetaminophen (Tylenol) can be harmful. · If you were given a spirometer to measure how well your lungs are working, use it as instructed. This can help your doctor tell how your recovery is going. · To prevent pneumonia in the future, talk to your doctor about getting a flu vaccine (once a year) and a pneumococcal vaccine (one time only for most people). When should you call for help? Call 911 anytime you think you may need emergency care. For example, call if:    · You have severe trouble breathing. Call your doctor now or seek immediate medical care if:    · You cough up dark brown or bloody mucus (sputum).     · You have new or worse trouble breathing.     · You are dizzy or lightheaded, or you feel like you may faint. Watch closely for changes in your health, and be sure to contact your doctor if:    · You have a new or higher fever.     · You are coughing more deeply or more often.     · You are not getting better after 2 days (48 hours).     · You do not get better as expected. Where can you learn more? Go to http://www.Identify.com/  Enter D336 in the search box to learn more about \"Pneumonia: Care Instructions. \"  Current as of: October 26, 2020               Content Version: 12.8  © 1569-5406 [employer], [employer]. Care instructions adapted under license by [employer] (which disclaims liability or warranty for this information).  If you have questions about a medical condition or this instruction, always ask your healthcare professional. [place] [address] any warranty or liability for your use of this information.

## 2021-05-14 NOTE — PROGRESS NOTES
Bedside report received from Deepa Jasmine RN. No distress sitting up in chair. Wife at bedside. Instructed pt to call should needs arise. Pt voiced understanding. Call light within reach.

## 2021-05-14 NOTE — PROGRESS NOTES
Pt able to rest in long intervals tonight with cpap on. No distress. Pt states he \"slept good with that pill\"  Will update oncoming nurse.

## 2021-05-14 NOTE — PROGRESS NOTES
Received report from [place], [facility]. Patient awake resting in bed. Respirations present. On room air. No signs of distress. AxO x4. No needs expressed. Bed low and locked. Call light within reach. Will continue to monitor.

## 2021-05-14 NOTE — PROGRESS NOTES
Pt requests sleeping pill. Did not want to take Benadryl. Restoril 15 mg po given for rest.  Will monitor.

## 2021-05-14 NOTE — PROGRESS NOTES
Care Management Interventions  PCP Verified by CM: Yes(Dr Chun Frank)  Discharge Durable Medical Equipment: No  Physical Therapy Consult: No  Occupational Therapy Consult: No  Speech Therapy Consult: No  Current Support Network: Own Home, Lives with Spouse  Confirm Follow Up Transport: Family  Discharge Location  Discharge Placement: Home  Patient discharged home today with no needs identified or supportive care orders received.

## 2021-05-14 NOTE — DISCHARGE SUMMARY
[facility] Hospitalist Discharge Summary     Name:  Agnes Suarez    Age:76 y.o.    Sex:male  :  1945       MRN:  [id number]       Admitting Physician: Michelle Michael MD Admit Date: 2021  7:39 PM   Attending Physician: Vciky Yung MD  Primary Care Physician: Roopa Gruber MD       Discharge Physician: Jay Lan NP  Discharge date: 21   Discharged Condition: Stable    Indication for Admission:   Chief Complaint   Patient presents with    Fever        Reasons for hospitalization:  Hospital Problems as of 2021 Date Reviewed: 3/15/2021          Codes Class Noted - Resolved POA    Acute metabolic encephalopathy ZYX-89-FH: G93.41  ICD-9-CM: 348.31  2021 - Present Unknown        * (Principal) CAP (community acquired pneumonia) ICD-10-CM: J18.9  ICD-9-CM: 204  2020 - Present         Type 2 diabetes with nephropathy ([facility] ICD-10-CM: E11.21  ICD-9-CM: 250.40, 583.81  2019 - Present Yes        Sepsis ([facility] ICD-10-CM: A41.9  ICD-9-CM: 038.9, 995.91  2019 - Present Unknown        Pulmonary emphysema ([facility] ICD-10-CM: J43.9  ICD-9-CM: 492.8  2016 - Present Yes        Hyperlipidemia ICD-10-CM: E78.5  ICD-9-CM: 272.4  11/10/2015 - Present Yes        COPD (chronic obstructive pulmonary disease) ([facility] ICD-10-CM: J44.9  ICD-9-CM: 990  4/10/2015 - Present Yes        Acute respiratory failure with hypoxia ([facility] ICD-10-CM: J96.01  ICD-9-CM: 518.81  10/23/2014 - Present Yes        Coronary artery disease involving native coronary artery of native heart without angina pectoris ICD-10-CM: I25.10  ICD-9-CM: 414.01  10/12/2011 - Present Yes        HTN (hypertension) (Chronic) ICD-10-CM: I10  ICD-9-CM: 401.9  10/12/2011 - Present Yes        Sleep apnea (Chronic) ICD-10-CM: G47.30  ICD-9-CM: 780.57  10/12/2011 - Present Yes        Morbid obesity ([facility] (Chronic) ICD-10-CM: E66.01  ICD-9-CM: 278.01  10/12/2011 - Present Yes             Discharge Diagnosis: CAP  Did Patient have Sepsis (YES OR NO): YES    Hospital Course/Interval history:  Tanja Loya a 67 y. o. male with medical history of  history of COPD (not on oxygen at home) presents to the emergency department with reports of altered mental status, fever and concern for sepsis.  Patient states he began feeling poorly this morning at approximately 6 AM.  This progressed throughout the day, his wife was at bedside states he was acting normally when she left around to run errands in the AM.  When she returned, patient was acting oddly and confused.  EMS was called, fever of 103 per report. Jose Manuel San was also tachycardic with multifocal PVCs consistent with bigeminy.  Patient was given a lidocaine push IV and started on a drip for this issue.  Sepsis protocol was initiated however given the lack of source, patient received no medication for sepsis.   Sats durations of 88% per EMS providers.  Of note, patient was diagnosed with COVID-19 earlier this year and required extended hospital and rehab stay lasting approximate 40 days.  Patient has been at home and feeling well up until today. Jose Manuel San has been vaccinated with both vaccines, completed his course last month.     In ER he had CXR with subtle bilateral infiltrates, WBC elevated at 14K, but lactic acid 1.5 and procal <0.05. He subsequently received IV abx and IVF in ER. Hospitalist admitted with CAP. Assessment/Plan:  Acute metabolic encephalopathy, resolved  CT head unremearkable  Avoid sedating meds  Fall precautions  Delirium precautions  PT/OT to eval and treat  Infection management as stated     Sepsis  CAP  Meets sepsis criteria with WBC>12K, T>100.9 on admission. Meets qSOFA of 3 with AMS, Rr>22, SBP<100.  LA wnl. Most likely 2/2 PNA.  CXR shows overall improvement in B/L opacities with mild bibasilar atelectasis/infiltrates are now present. Abx: cefepime/doxy (5/12-. ..).  D/c Vanc d/t MRSA neg and add doxy for atypical coverage  Probiotics  MRSA negative, procal negative  BCx 5/11: pending  SpCx: pending  5/13 sputum and blood cultures remain pending, anticipate d/c home on PO Doxy once cultures finalize  5/14 Bood cx NGTD x 72 hours, sputum culture preliminary with moderate normal respiratory martha, f/u with PCP in 3-5 days, d/c on PO Doxy to complete 7 day course, clinically much improved, VSS, remains on RA with good O2 sats     Leukocytosis  2/2 infection and steroids  CTM  5/13 WBC trending down to 15.9  5/14 WBC 10.4     Respiratory failure with hypoxia, resolved  Most likely 2/2 CAP vs acute COPD exacerbations  CXR shows overall improvement in B/L opacities with mild bibasilar atelectasis/infiltrates are now present. Febrile with WBC>12K on admission. Received Solumedrol, Vanc, Ceftrixone and Azithro in ED  Cont abx per above  Holding further steroids as no more wheezing on exam  Weaned down to RA this afternoon  5/13 Patient comfortable on RA, no SOB, no wheezing on exam  5/14 Pt on RA, no SOB, no wheezing on exam     DM 2  Holding home Metformin, Januvia, glipizide  Continue SSI  Diabetic diet  5/14 Resume home medications upon discharge     CAD s/p PCI w/ stents  Continue home aspirin and Plavix, statin     COPD  Continue home butyryl, Advair, Flonase     HLD  Continue home statin     NOMI  CPAP nightly     HTN  Holding home amlodipine-valsartan as BP on the low end of normal  5/13 BP normalized today, continue to monitor, may restart home meds if remains stable  5/14 BP stable, resume home medications upon discharge       Consults:   Case Management  OT/PT    Disposition: Home or Self Care, no further skilled therapy recommendations  Diet:   DIET DIABETIC CONSISTENT CARB  Code Status: Full Code      Follow up labs/imaging: n/a  Follow up with PCP in 1 week  Pending labs/studies: n/a    Current Discharge Medication List      START taking these medications    Details   doxycycline (VIBRAMYCIN) 100 mg capsule Take 1 Cap by mouth two (2) times a day for 5 days.   Qty: 10 Cap, Refills: 0  Start date: 5/14/2021, End date: 5/19/2021         CONTINUE these medications which have NOT CHANGED    Details   metFORMIN (GLUCOPHAGE) 500 mg tablet TAKE TWO TABLETS BY MOUTH 2 TIMES A DAY  Qty: 120 Tab, Refills: 11    Comments: PATIENT NEEDS A NEW RX FOR METFORMIN 500MG, [facility]      Januvia 100 mg tablet TAKE ONE TABLET BY MOUTH EVERY DAY  Qty: 30 Tab, Refills: 11      magnesium oxide (MAG-OX) 400 mg tablet Take 1 Tab by mouth daily. Qty: 90 Tab, Refills: 0    Associated Diagnoses: Hypomagnesemia      glipiZIDE SR (GLUCOTROL XL) 10 mg CR tablet TAKE ONE TABLET BY MOUTH 2 TIMES A DAY  Qty: 60 Tab, Refills: 3    Associated Diagnoses: Type 2 diabetes mellitus with hyperglycemia, without long-term current use of insulin (HCC)      amLODIPine-valsartan (EXFORGE)  mg per tablet TAKE 1/2 TO 1 TABLET BY MOUTH EVERY DAY  Qty: 30 Tab, Refills: 2    Associated Diagnoses: Essential hypertension      clopidogreL (PLAVIX) 75 mg tab TAKE ONE TABLET BY MOUTH EVERY DAY  Qty: 90 Tab, Refills: 3    Comments: * * N O T I C E * * Last quantity doesn't match original quantity      montelukast (SINGULAIR) 10 mg tablet TAKE 1 TABLET BY MOUTH EVERY DAY AT BEDTIME  Qty: 90 Tab, Refills: 3    Comments: Generic For:*SINGULAIR 10MG 08/07/2019 10:40:28 AM  N O T I C E      rosuvastatin (CRESTOR) 10 mg tablet TAKE ONE TABLET BY MOUTH ONCE A DAY  Qty: 90 Tab, Refills: 2      guaiFENesin (MUCINEX) 1,200 mg Ta12 ER tablet Take 1,200 mg by mouth two (2) times daily as needed. ascorbic acid, vitamin C, (VITAMIN C) 500 mg tablet Take 500 mg by mouth nightly. glucosamine/chondr funez A sod (GLUCOSAMINE-CHONDROITIN) 750-600 mg tab Take  by mouth two (2) times a day. cholecalciferol, vitamin D3, (VITAMIN D3) 2,000 unit tab Take 2,000 Units by mouth nightly. aspirin 81 mg tablet Take 81 mg by mouth nightly.       fluticasone propionate (FLONASE) 50 mcg/actuation nasal spray USE 1 OR 2 SPRAYS IN EACH NOSTRIL EVERY DAY  Qty: 48 g, Refills: 2    Comments: PATIENT NEEDS A NEW RX FOR FLUTICASONE SPRAY 50MCG, THANKS, [facility]      furosemide (LASIX) 40 mg tablet Take 1 Tab by mouth daily. Patient taking 1/2 daily  Qty: 30 Tab, Refills: 0      glucose blood VI test strips (ASCENSIA AUTODISC VI, ONE TOUCH ULTRA TEST VI) strip E11.9  Qty: 100 Strip, Refills: 11    Associated Diagnoses: Controlled type 2 diabetes mellitus without complication, with long-term current use of insulin ([facility])      nystatin (MYCOSTATIN) 100,000 unit/gram ointment Apply  to affected area two (2) times a day. Qty: 30 g, Refills: 0    Associated Diagnoses: Scrotal irritation      ProAir HFA 90 mcg/actuation inhaler Take 2 Puffs by inhalation four (4) times daily as needed for Wheezing or Shortness of Breath. Qty: 1 Inhaler, Refills: 11      fluticasone propion-salmeteroL (ADVAIR/WIXELA) 250-50 mcg/dose diskus inhaler USE 1 PUFF TWICE DAILY; RINSE MOUTH AFTER EACH USE. Qty: 60 Each, Refills: 11    Associated Diagnoses: Chronic obstructive pulmonary disease, unspecified COPD type ([facility])      FISH OIL CONCENTRATE 1,000 mg cap Take 1-2 Caps by mouth two (2) times a day. One tab in am & 2 tabs in pm      nitroglycerin (NITROSTAT) 0.4 mg SL tablet 0.4 mg by SubLINGual route every five (5) minutes as needed for Chest Pain.      garlic 2,152 mg cap Take 1 Tab by mouth two (2) times a day. MULTIVITS-MINERALS/FA/LYCOPENE (ONE-A-DAY MEN'S MULTIVITAMIN PO) Take 1 Tab by mouth daily. cpap machine kit Take  by inhalation.  qhs             Medications Discontinued During This Encounter   Medication Reason    lactated ringers bolus infusion 1,000 mL     insulin lispro (HUMALOG) injection DUPLICATE ORDER    furosemide (LASIX) tablet 20 mg     vancomycin (VANCOCIN) in 0.9% sodium chloride 250 mL infusion REORDER    glipiZIDE (GLUCOTROL) tablet 10 mg     enoxaparin (LOVENOX) injection 40 mg     vancomycin (VANCOCIN) 1,000 mg in 0.9% sodium chloride 250 mL (VIAL-MATE)     vancomycin (VANCOCIN) 1250 mg in  ml infusion          Follow Up Orders: Follow-up Appointments   Procedures    FOLLOW UP VISIT Appointment in: 3 - 5 Days PCP in 3-5 Days     PCP in 3-5 Days     Standing Status:   Standing     Number of Occurrences:   1     Order Specific Question:   Appointment in     Answer:   3 - 5 Days         Follow-up Information     Follow up With Specialties Details Why Contact Info    Demetris Muse MD Internal Medicine On 5/20/2021 10:45 AM [address] [place] [address]  [phone]              Discharge Exam:    Patient Vitals for the past 24 hrs:   Temp Pulse Resp BP SpO2   05/14/21 0735 98.6 °F (37 °C) 67 17 128/75 94 %   05/14/21 0715 -- -- -- -- 95 %   05/13/21 2359 98.4 °F (36.9 °C) (!) 51 17 132/61 94 %   05/13/21 2008 -- -- -- -- 98 %   05/13/21 1925 97.9 °F (36.6 °C) 73 18 134/75 93 %   05/13/21 1514 97.3 °F (36.3 °C) 83 18 131/84 93 %   05/13/21 1138 97.8 °F (36.6 °C) 76 18 124/68 95 %     Oxygen Therapy  O2 Sat (%): 94 % (05/14/21 0735)  Pulse via Oximetry: 70 beats per minute (05/14/21 0715)  O2 Device: None (Room air) (05/14/21 0739)  O2 Flow Rate (L/min): 2 l/min (05/12/21 1331)  FIO2 (%): 32 % (05/12/21 0905)    Estimated body mass index is 34.24 kg/m² as calculated from the following:    Height as of this encounter: 6' 1\" (1.854 m). Weight as of this encounter: 117.7 kg (259 lb 8 oz). Intake/Output Summary (Last 24 hours) at 5/14/2021 1059  Last data filed at 5/14/2021 0837  Gross per 24 hour   Intake 480 ml   Output --   Net 480 ml       *Note that automatically entered I/Os may not be accurate; dependent on patient compliance with collection and accurate  by assistants.     Physical Exam:   General:          No acute distress, speaking in full sentences, pleasant and conversational   HEENT:           PERRLA, no JVD  Lungs:             Clear to auscultation bilaterally, diminished in bilateral bases  CV: Regular rate and rhythm with normal S1 and S2   Abdomen:        Obese, yet soft, nontender, nondistended, normoactive bowel sounds   Extremities:     No cyanosis clubbing or edema   Neuro:             Nonfocal, A&O x3   Psych:             Normal affect      All Labs from Last 24 Hrs:  Recent Results (from the past 24 hour(s))   GLUCOSE, POC    Collection Time: 05/13/21 11:38 AM   Result Value Ref Range    Glucose (POC) 194 (H) 65 - 100 mg/dL    Performed by Oleg Daniels    GLUCOSE, POC    Collection Time: 05/13/21  4:51 PM   Result Value Ref Range    Glucose (POC) 193 (H) 65 - 100 mg/dL    Performed by Oleg Daniels    GLUCOSE, POC    Collection Time: 05/13/21  8:38 PM   Result Value Ref Range    Glucose (POC) 152 (H) 65 - 100 mg/dL    Performed by TabithareMailSTACEY    GLUCOSE, POC    Collection Time: 05/14/21  6:01 AM   Result Value Ref Range    Glucose (POC) 164 (H) 65 - 100 mg/dL    Performed by CallsFreeCallsMARGARET    CBC WITH AUTOMATED DIFF    Collection Time: 05/14/21  6:11 AM   Result Value Ref Range    WBC 10.4 4.3 - 11.1 K/uL    RBC 3.43 (L) 4.23 - 5.6 M/uL    HGB 10.7 (L) 13.6 - 17.2 g/dL    HCT 32.0 (L) 41.1 - 50.3 %    MCV 93.3 79.6 - 97.8 FL    MCH 31.2 26.1 - 32.9 PG    MCHC 33.4 31.4 - 35.0 g/dL    RDW 15.5 (H) 11.9 - 14.6 %    PLATELET 633 299 - 542 K/uL    MPV 10.5 9.4 - 12.3 FL    ABSOLUTE NRBC 0.00 0.0 - 0.2 K/uL    DF AUTOMATED      NEUTROPHILS 80 (H) 43 - 78 %    LYMPHOCYTES 11 (L) 13 - 44 %    MONOCYTES 8 4.0 - 12.0 %    EOSINOPHILS 0 (L) 0.5 - 7.8 %    BASOPHILS 0 0.0 - 2.0 %    IMMATURE GRANULOCYTES 1 0.0 - 5.0 %    ABS. NEUTROPHILS 8.3 (H) 1.7 - 8.2 K/UL    ABS. LYMPHOCYTES 1.1 0.5 - 4.6 K/UL    ABS. MONOCYTES 0.8 0.1 - 1.3 K/UL    ABS. EOSINOPHILS 0.0 0.0 - 0.8 K/UL    ABS. BASOPHILS 0.0 0.0 - 0.2 K/UL    ABS. IMM.  GRANS. 0.1 0.0 - 0.5 K/UL   METABOLIC PANEL, BASIC    Collection Time: 05/14/21  6:11 AM   Result Value Ref Range    Sodium 138 136 - 145 mmol/L    Potassium 3.9 3.5 - 5.1 mmol/L    Chloride 106 98 - 107 mmol/L    CO2 27 21 - 32 mmol/L    Anion gap 5 (L) 7 - 16 mmol/L    Glucose 165 (H) 65 - 100 mg/dL    BUN 14 8 - 23 MG/DL    Creatinine 0.79 (L) 0.8 - 1.5 MG/DL    GFR est AA >60 >60 ml/min/1.73m2    GFR est non-AA >60 >60 ml/min/1.73m2    Calcium 8.7 8.3 - 10.4 MG/DL       All Micro Results     Procedure Component Value Units Date/Time    CULTURE, RESPIRATORY/SPUTUM/BRONCH Sunita Hash STAIN [[id number]] Collected: 05/13/21 0903    Order Status: Completed Specimen: Sputum Updated: 05/14/21 1041     Special Requests: NO SPECIAL REQUESTS        GRAM STAIN 1 TO 20 WBCS SEEN PER OIF      0 TO 3 EPITHELIAL CELLS SEEN PER OIF      FEW GRAM POSITIVE COCCI         FEW GRAM NEGATIVE COCCI         FEW GRAM NEGATIVE RODS         2+ MUCUS PRESENT        Culture result:       MODERATE NORMAL RESPIRATORY CARLOS          BLOOD CULTURE [[id number]] Collected: 05/11/21 1950    Order Status: Completed Specimen: Blood Updated: 05/14/21 0702     Special Requests: RIGHT ANTECUBITAL        Culture result: NO GROWTH 3 DAYS       MSSA/MRSA SC BY PCR, NASAL SWAB [[id number]] Collected: 05/12/21 1349    Order Status: Completed Specimen: Nasal swab Updated: 05/12/21 1532     Special Requests: NO SPECIAL REQUESTS        Culture result:       SA target not detected. A MRSA NEGATIVE, SA NEGATIVE test result does not preclude MRSA or SA nasal colonization. BLOOD CULTURE [[id number]]     Order Status: Sent Specimen: Blood           SARS-CoV-2 Lab Results  \"Novel Coronavirus\" Test: No results found for: COV2NT   \"Emergent Disease\" Test: No results found for: EDPR  \"SARS-COV-2\" Test: No results found for: XGCOVT  Rapid Test:   Lab Results   Component Value Date/Time    COVR Detected (AA) 12/31/2020 05:49 AM            Diagnostic Imaging/Tests:   Ct Head Wo Cont    Result Date: 5/11/2021  Unremarkable unenhanced CT scan of the brain when allowing for mild motion artifact.      Xr Chest Port    Result Date: 5/11/2021  Overall improvement in bilateral airspace opacities. Mild bibasilar atelectasis/infiltrates are now present. Echocardiogram/EKG results:  No results found for this visit on 05/11/21. EKG Results     Procedure 720 Value Units Date/Time    EKG, 12 LEAD, INITIAL [[id number]] Collected: 05/11/21 1948    Order Status: Completed Updated: 05/12/21 0716     Ventricular Rate 110 BPM      Atrial Rate 110 BPM      P-R Interval 174 ms      QRS Duration 88 ms      Q-T Interval 316 ms      QTC Calculation (Bezet) 427 ms      Calculated P Axis 94 degrees      Calculated R Axis 73 degrees      Calculated T Axis 53 degrees      Diagnosis --     !! AGE AND GENDER SPECIFIC ECG ANALYSIS !! Sinus tachycardia with frequent and consecutive Premature ventricular   complexes and Fusion complexes  Nonspecific ST abnormality  Abnormal ECG  When compared with ECG of 31-DEC-2020 01:24,  Fusion complexes are now Present  Premature supraventricular complexes are no longer Present  WI interval has decreased  QRS axis Shifted left  Confirmed by [facility]  MD (UC)OSIEL ([id number]) on 5/12/2021 7:16:19 AM            Results for orders placed or performed during the hospital encounter of 05/11/21   EKG, 12 LEAD, INITIAL   Result Value Ref Range    Ventricular Rate 110 BPM    Atrial Rate 110 BPM    P-R Interval 174 ms    QRS Duration 88 ms    Q-T Interval 316 ms    QTC Calculation (Bezet) 427 ms    Calculated P Axis 94 degrees    Calculated R Axis 73 degrees    Calculated T Axis 53 degrees    Diagnosis       !! AGE AND GENDER SPECIFIC ECG ANALYSIS !!   Sinus tachycardia with frequent and consecutive Premature ventricular   complexes and Fusion complexes  Nonspecific ST abnormality  Abnormal ECG  When compared with ECG of 31-DEC-2020 01:24,  Fusion complexes are now Present  Premature supraventricular complexes are no longer Present  WI interval has decreased  QRS axis Shifted left  Confirmed by [facility]  MD (), OSIEL WASHINGTON ([id number]) on 5/12/2021 7:16:19 AM     Results for orders placed or performed in visit on 03/02/21   AMB POC EKG ROUTINE W/ 12 LEADS, INTER & REP    Impression    Sinus  Tachycardia  - frequent multiform ectopic ventricular beats   # VECs = 7, # types 5  BORDERLINE RHYTHM       Procedures done this admission:  * No surgery found *        Time spent in patient discharge planning and coordination 40 minutes.     Case discussed with patient and care team.    Signed By: Eliel Medel [facility]  [address] [facility]    May 14, 2021  10:59 AM

## 2021-05-15 LAB
BACTERIA SPEC CULT: NORMAL
GRAM STN SPEC: NORMAL
SERVICE CMNT-IMP: NORMAL

## 2021-05-16 LAB
BACTERIA SPEC CULT: NORMAL
SERVICE CMNT-IMP: NORMAL

## 2021-07-16 ENCOUNTER — PATIENT OUTREACH (OUTPATIENT)
Dept: CASE MANAGEMENT | Age: 76
End: 2021-07-16

## 2021-07-19 NOTE — PROGRESS NOTES
Health  Outreach    Contacted Sepsis bundle patient for follow up. Patient stated that he was doing well without issue related to his bundle. The patient is continuing to have pain issues with his neck. Has been followed by Neuro. Encouraged to continue monitoring for red flags and perform early interventions for problems. HC will follow again in two weeks.

## 2021-09-01 ENCOUNTER — HOSPITAL ENCOUNTER (OUTPATIENT)
Dept: GENERAL RADIOLOGY | Age: 76
Discharge: HOME | End: 2021-09-01
Payer: MEDICARE

## 2021-09-01 DIAGNOSIS — R09.02 HYPOXIA: ICD-10-CM

## 2021-09-01 DIAGNOSIS — J44.9 CHRONIC OBSTRUCTIVE PULMONARY DISEASE, UNSPECIFIED COPD TYPE (HCC): ICD-10-CM

## 2021-09-01 PROBLEM — J18.9 PNA (PNEUMONIA): Status: RESOLVED | Noted: 2019-02-08 | Resolved: 2021-09-01

## 2021-09-01 PROBLEM — U07.1 COVID-19: Status: RESOLVED | Noted: 2020-12-31 | Resolved: 2021-09-01

## 2021-09-01 PROBLEM — J18.9 CAP (COMMUNITY ACQUIRED PNEUMONIA): Status: RESOLVED | Noted: 2020-12-31 | Resolved: 2021-09-01

## 2021-09-01 PROCEDURE — 71046 X-RAY EXAM CHEST 2 VIEWS: CPT

## 2021-09-21 PROBLEM — I47.29 MONOMORPHIC VENTRICULAR TACHYCARDIA: Status: RESOLVED | Noted: 2020-12-31 | Resolved: 2021-09-21

## 2022-03-04 ENCOUNTER — HOSPITAL ENCOUNTER (INPATIENT)
Age: 77
LOS: 6 days | Discharge: HOME | DRG: 224 | End: 2022-03-10
Attending: EMERGENCY MEDICINE | Admitting: INTERNAL MEDICINE
Payer: MEDICARE

## 2022-03-04 ENCOUNTER — APPOINTMENT (OUTPATIENT)
Dept: GENERAL RADIOLOGY | Age: 77
DRG: 224 | End: 2022-03-04
Attending: EMERGENCY MEDICINE
Payer: MEDICARE

## 2022-03-04 DIAGNOSIS — I47.20 VENTRICULAR TACHYCARDIA: Primary | ICD-10-CM

## 2022-03-04 DIAGNOSIS — I48.91 ATRIAL FIBRILLATION WITH RVR (HCC): ICD-10-CM

## 2022-03-04 DIAGNOSIS — J11.00 INFLUENZA AND PNEUMONIA: ICD-10-CM

## 2022-03-04 DIAGNOSIS — E11.51 DM (DIABETES MELLITUS) TYPE II, CONTROLLED, WITH PERIPHERAL VASCULAR DISORDER (HCC): Chronic | ICD-10-CM

## 2022-03-04 DIAGNOSIS — I10 PRIMARY HYPERTENSION: Chronic | ICD-10-CM

## 2022-03-04 DIAGNOSIS — I50.20 HFREF (HEART FAILURE WITH REDUCED EJECTION FRACTION) (HCC): ICD-10-CM

## 2022-03-04 DIAGNOSIS — Z95.810 ICD (IMPLANTABLE CARDIOVERTER-DEFIBRILLATOR) IN PLACE: ICD-10-CM

## 2022-03-04 DIAGNOSIS — I25.10 CORONARY ARTERY DISEASE INVOLVING NATIVE CORONARY ARTERY OF NATIVE HEART WITHOUT ANGINA PECTORIS: ICD-10-CM

## 2022-03-04 DIAGNOSIS — J44.9 CHRONIC OBSTRUCTIVE PULMONARY DISEASE, UNSPECIFIED COPD TYPE (HCC): ICD-10-CM

## 2022-03-04 DIAGNOSIS — R07.9 CHEST PAIN: ICD-10-CM

## 2022-03-04 DIAGNOSIS — I47.20 V-TACH: ICD-10-CM

## 2022-03-04 LAB
ALBUMIN SERPL-MCNC: 4 G/DL (ref 3.2–4.6)
ALBUMIN/GLOB SERPL: 1.3 {RATIO} (ref 1.2–3.5)
ALP SERPL-CCNC: 57 U/L (ref 50–136)
ALT SERPL-CCNC: 35 U/L (ref 12–65)
ANION GAP SERPL CALC-SCNC: 8 MMOL/L (ref 7–16)
APTT PPP: 24.3 SEC (ref 24.1–35.1)
AST SERPL-CCNC: 19 U/L (ref 15–37)
BASOPHILS # BLD: 0.1 K/UL (ref 0–0.2)
BASOPHILS NFR BLD: 0 % (ref 0–2)
BILIRUB SERPL-MCNC: 0.6 MG/DL (ref 0.2–1.1)
BNP SERPL-MCNC: 1240 PG/ML
BUN SERPL-MCNC: 23 MG/DL (ref 8–23)
CALCIUM SERPL-MCNC: 9.1 MG/DL (ref 8.3–10.4)
CHLORIDE SERPL-SCNC: 107 MMOL/L (ref 98–107)
CO2 SERPL-SCNC: 24 MMOL/L (ref 21–32)
CREAT SERPL-MCNC: 1.6 MG/DL (ref 0.8–1.5)
DIFFERENTIAL METHOD BLD: ABNORMAL
EOSINOPHIL # BLD: 0 K/UL (ref 0–0.8)
EOSINOPHIL NFR BLD: 0 % (ref 0.5–7.8)
ERYTHROCYTE [DISTWIDTH] IN BLOOD BY AUTOMATED COUNT: 15.4 % (ref 11.9–14.6)
GLOBULIN SER CALC-MCNC: 3 G/DL (ref 2.3–3.5)
GLUCOSE BLD STRIP.AUTO-MCNC: 100 MG/DL (ref 65–100)
GLUCOSE SERPL-MCNC: 194 MG/DL (ref 65–100)
HCT VFR BLD AUTO: 36.1 % (ref 41.1–50.3)
HGB BLD-MCNC: 12.1 G/DL (ref 13.6–17.2)
IMM GRANULOCYTES # BLD AUTO: 0.1 K/UL (ref 0–0.5)
IMM GRANULOCYTES NFR BLD AUTO: 1 % (ref 0–5)
INR PPP: 1.2
LIPASE SERPL-CCNC: 174 U/L (ref 73–393)
LYMPHOCYTES # BLD: 0.6 K/UL (ref 0.5–4.6)
LYMPHOCYTES NFR BLD: 5 % (ref 13–44)
MAGNESIUM SERPL-MCNC: 2.1 MG/DL (ref 1.8–2.4)
MCH RBC QN AUTO: 32.8 PG (ref 26.1–32.9)
MCHC RBC AUTO-ENTMCNC: 33.5 G/DL (ref 31.4–35)
MCV RBC AUTO: 97.8 FL (ref 79.6–97.8)
MONOCYTES # BLD: 0.9 K/UL (ref 0.1–1.3)
MONOCYTES NFR BLD: 6 % (ref 4–12)
NEUTS SEG # BLD: 11.7 K/UL (ref 1.7–8.2)
NEUTS SEG NFR BLD: 87 % (ref 43–78)
NRBC # BLD: 0 K/UL (ref 0–0.2)
PLATELET # BLD AUTO: 164 K/UL (ref 150–450)
PMV BLD AUTO: 11.3 FL (ref 9.4–12.3)
POTASSIUM SERPL-SCNC: 4.5 MMOL/L (ref 3.5–5.1)
PROT SERPL-MCNC: 7 G/DL (ref 6.3–8.2)
PROTHROMBIN TIME: 15.6 SEC (ref 12.6–14.5)
RBC # BLD AUTO: 3.69 M/UL (ref 4.23–5.6)
SERVICE CMNT-IMP: NORMAL
SODIUM SERPL-SCNC: 139 MMOL/L (ref 136–145)
TROPONIN-HIGH SENSITIVITY: 13.1 PG/ML (ref 0–14)
TROPONIN-HIGH SENSITIVITY: 64 PG/ML (ref 0–14)
UFH PPP CHRO-ACNC: 0.12 IU/ML (ref 0.3–0.7)
WBC # BLD AUTO: 13.4 K/UL (ref 4.3–11.1)

## 2022-03-04 PROCEDURE — 74011250636 HC RX REV CODE- 250/636: Performed by: EMERGENCY MEDICINE

## 2022-03-04 PROCEDURE — 85520 HEPARIN ASSAY: CPT

## 2022-03-04 PROCEDURE — 94640 AIRWAY INHALATION TREATMENT: CPT

## 2022-03-04 PROCEDURE — 65660000000 HC RM CCU STEPDOWN

## 2022-03-04 PROCEDURE — 83735 ASSAY OF MAGNESIUM: CPT

## 2022-03-04 PROCEDURE — 80053 COMPREHEN METABOLIC PANEL: CPT

## 2022-03-04 PROCEDURE — 99223 1ST HOSP IP/OBS HIGH 75: CPT | Performed by: INTERNAL MEDICINE

## 2022-03-04 PROCEDURE — 74011000258 HC RX REV CODE- 258: Performed by: EMERGENCY MEDICINE

## 2022-03-04 PROCEDURE — 74011000250 HC RX REV CODE- 250: Performed by: PHYSICIAN ASSISTANT

## 2022-03-04 PROCEDURE — 96375 TX/PRO/DX INJ NEW DRUG ADDON: CPT

## 2022-03-04 PROCEDURE — 83690 ASSAY OF LIPASE: CPT

## 2022-03-04 PROCEDURE — 85025 COMPLETE CBC W/AUTO DIFF WBC: CPT

## 2022-03-04 PROCEDURE — 84484 ASSAY OF TROPONIN QUANT: CPT

## 2022-03-04 PROCEDURE — 74011250636 HC RX REV CODE- 250/636: Performed by: INTERNAL MEDICINE

## 2022-03-04 PROCEDURE — 2709999900 HC NON-CHARGEABLE SUPPLY

## 2022-03-04 PROCEDURE — 85610 PROTHROMBIN TIME: CPT

## 2022-03-04 PROCEDURE — 36415 COLL VENOUS BLD VENIPUNCTURE: CPT

## 2022-03-04 PROCEDURE — 94664 DEMO&/EVAL PT USE INHALER: CPT

## 2022-03-04 PROCEDURE — 93005 ELECTROCARDIOGRAM TRACING: CPT | Performed by: EMERGENCY MEDICINE

## 2022-03-04 PROCEDURE — 85730 THROMBOPLASTIN TIME PARTIAL: CPT

## 2022-03-04 PROCEDURE — 99285 EMERGENCY DEPT VISIT HI MDM: CPT

## 2022-03-04 PROCEDURE — 83880 ASSAY OF NATRIURETIC PEPTIDE: CPT

## 2022-03-04 PROCEDURE — 71045 X-RAY EXAM CHEST 1 VIEW: CPT

## 2022-03-04 PROCEDURE — 94760 N-INVAS EAR/PLS OXIMETRY 1: CPT

## 2022-03-04 PROCEDURE — 74011250636 HC RX REV CODE- 250/636: Performed by: PHYSICIAN ASSISTANT

## 2022-03-04 PROCEDURE — 82962 GLUCOSE BLOOD TEST: CPT

## 2022-03-04 PROCEDURE — 74011250637 HC RX REV CODE- 250/637: Performed by: PHYSICIAN ASSISTANT

## 2022-03-04 PROCEDURE — 96374 THER/PROPH/DIAG INJ IV PUSH: CPT

## 2022-03-04 RX ORDER — SODIUM CHLORIDE 9 MG/ML
75 INJECTION, SOLUTION INTRAVENOUS CONTINUOUS
Status: DISCONTINUED | OUTPATIENT
Start: 2022-03-04 | End: 2022-03-05

## 2022-03-04 RX ORDER — HEPARIN SODIUM 5000 [USP'U]/100ML
12-25 INJECTION, SOLUTION INTRAVENOUS
Status: DISCONTINUED | OUTPATIENT
Start: 2022-03-04 | End: 2022-03-07

## 2022-03-04 RX ORDER — SODIUM CHLORIDE 0.9 % (FLUSH) 0.9 %
5-10 SYRINGE (ML) INJECTION AS NEEDED
Status: DISCONTINUED | OUTPATIENT
Start: 2022-03-04 | End: 2022-03-10 | Stop reason: HOSPADM

## 2022-03-04 RX ORDER — CLOPIDOGREL BISULFATE 75 MG/1
75 TABLET ORAL DAILY
Status: DISCONTINUED | OUTPATIENT
Start: 2022-03-05 | End: 2022-03-10 | Stop reason: HOSPADM

## 2022-03-04 RX ORDER — SODIUM CHLORIDE 0.9 % (FLUSH) 0.9 %
5-10 SYRINGE (ML) INJECTION EVERY 8 HOURS
Status: DISCONTINUED | OUTPATIENT
Start: 2022-03-04 | End: 2022-03-09

## 2022-03-04 RX ORDER — GUAIFENESIN 100 MG/5ML
81 LIQUID (ML) ORAL
Status: DISCONTINUED | OUTPATIENT
Start: 2022-03-04 | End: 2022-03-08

## 2022-03-04 RX ORDER — PROMETHAZINE HYDROCHLORIDE 25 MG/1
25 TABLET ORAL
Status: DISCONTINUED | OUTPATIENT
Start: 2022-03-04 | End: 2022-03-10 | Stop reason: HOSPADM

## 2022-03-04 RX ORDER — INSULIN LISPRO 100 [IU]/ML
INJECTION, SOLUTION INTRAVENOUS; SUBCUTANEOUS
Status: DISCONTINUED | OUTPATIENT
Start: 2022-03-04 | End: 2022-03-10 | Stop reason: HOSPADM

## 2022-03-04 RX ORDER — MORPHINE SULFATE 4 MG/ML
2 INJECTION INTRAVENOUS
Status: DISCONTINUED | OUTPATIENT
Start: 2022-03-04 | End: 2022-03-10 | Stop reason: HOSPADM

## 2022-03-04 RX ORDER — MONTELUKAST SODIUM 10 MG/1
10 TABLET ORAL
Status: DISCONTINUED | OUTPATIENT
Start: 2022-03-04 | End: 2022-03-10 | Stop reason: HOSPADM

## 2022-03-04 RX ORDER — LANOLIN ALCOHOL/MO/W.PET/CERES
400 CREAM (GRAM) TOPICAL DAILY
Status: DISCONTINUED | OUTPATIENT
Start: 2022-03-05 | End: 2022-03-10 | Stop reason: HOSPADM

## 2022-03-04 RX ORDER — ALBUTEROL SULFATE 0.83 MG/ML
2.5 SOLUTION RESPIRATORY (INHALATION)
Status: DISCONTINUED | OUTPATIENT
Start: 2022-03-04 | End: 2022-03-10 | Stop reason: HOSPADM

## 2022-03-04 RX ORDER — ACETAMINOPHEN 325 MG/1
650 TABLET ORAL
Status: DISCONTINUED | OUTPATIENT
Start: 2022-03-04 | End: 2022-03-10 | Stop reason: HOSPADM

## 2022-03-04 RX ORDER — HEPARIN SODIUM 1000 [USP'U]/ML
40 INJECTION, SOLUTION INTRAVENOUS; SUBCUTANEOUS ONCE
Status: COMPLETED | OUTPATIENT
Start: 2022-03-05 | End: 2022-03-04

## 2022-03-04 RX ORDER — NITROGLYCERIN 0.4 MG/1
0.4 TABLET SUBLINGUAL
Status: DISCONTINUED | OUTPATIENT
Start: 2022-03-04 | End: 2022-03-10 | Stop reason: HOSPADM

## 2022-03-04 RX ORDER — ROSUVASTATIN CALCIUM 10 MG/1
10 TABLET, COATED ORAL DAILY
Status: DISCONTINUED | OUTPATIENT
Start: 2022-03-05 | End: 2022-03-10 | Stop reason: HOSPADM

## 2022-03-04 RX ORDER — HEPARIN SODIUM 1000 [USP'U]/ML
60 INJECTION, SOLUTION INTRAVENOUS; SUBCUTANEOUS ONCE
Status: COMPLETED | OUTPATIENT
Start: 2022-03-04 | End: 2022-03-04

## 2022-03-04 RX ORDER — BUDESONIDE AND FORMOTEROL FUMARATE DIHYDRATE 80; 4.5 UG/1; UG/1
2 AEROSOL RESPIRATORY (INHALATION)
Status: DISCONTINUED | OUTPATIENT
Start: 2022-03-04 | End: 2022-03-10 | Stop reason: HOSPADM

## 2022-03-04 RX ORDER — HYDROCODONE BITARTRATE AND ACETAMINOPHEN 7.5; 325 MG/1; MG/1
1 TABLET ORAL
Status: DISCONTINUED | OUTPATIENT
Start: 2022-03-04 | End: 2022-03-10 | Stop reason: HOSPADM

## 2022-03-04 RX ORDER — ALOGLIPTIN 12.5 MG/1
12.5 TABLET, FILM COATED ORAL DAILY
Status: DISCONTINUED | OUTPATIENT
Start: 2022-03-05 | End: 2022-03-10 | Stop reason: HOSPADM

## 2022-03-04 RX ORDER — LATANOPROST 50 UG/ML
1 SOLUTION/ DROPS OPHTHALMIC
COMMUNITY

## 2022-03-04 RX ORDER — VALSARTAN 320 MG/1
320 TABLET ORAL DAILY
Status: DISCONTINUED | OUTPATIENT
Start: 2022-03-05 | End: 2022-03-10 | Stop reason: HOSPADM

## 2022-03-04 RX ORDER — CILOSTAZOL 50 MG/1
50 TABLET ORAL 2 TIMES DAILY
Status: DISCONTINUED | OUTPATIENT
Start: 2022-03-04 | End: 2022-03-10 | Stop reason: HOSPADM

## 2022-03-04 RX ORDER — GLIPIZIDE 5 MG/1
10 TABLET ORAL 2 TIMES DAILY
Status: DISCONTINUED | OUTPATIENT
Start: 2022-03-04 | End: 2022-03-10 | Stop reason: HOSPADM

## 2022-03-04 RX ORDER — CARVEDILOL 6.25 MG/1
6.25 TABLET ORAL 2 TIMES DAILY WITH MEALS
Status: DISCONTINUED | OUTPATIENT
Start: 2022-03-04 | End: 2022-03-06

## 2022-03-04 RX ORDER — POLYETHYLENE GLYCOL 3350 17 G/17G
17 POWDER, FOR SOLUTION ORAL DAILY
Status: DISCONTINUED | OUTPATIENT
Start: 2022-03-05 | End: 2022-03-10 | Stop reason: HOSPADM

## 2022-03-04 RX ORDER — SODIUM CHLORIDE 0.9 % (FLUSH) 0.9 %
5-40 SYRINGE (ML) INJECTION AS NEEDED
Status: DISCONTINUED | OUTPATIENT
Start: 2022-03-04 | End: 2022-03-10 | Stop reason: HOSPADM

## 2022-03-04 RX ORDER — FLUTICASONE PROPIONATE 50 MCG
2 SPRAY, SUSPENSION (ML) NASAL DAILY
Status: DISCONTINUED | OUTPATIENT
Start: 2022-03-05 | End: 2022-03-10 | Stop reason: HOSPADM

## 2022-03-04 RX ADMIN — AMIODARONE HYDROCHLORIDE 150 MG: 50 INJECTION, SOLUTION INTRAVENOUS at 16:13

## 2022-03-04 RX ADMIN — SODIUM CHLORIDE, PRESERVATIVE FREE 10 ML: 5 INJECTION INTRAVENOUS at 21:39

## 2022-03-04 RX ADMIN — SODIUM CHLORIDE 75 ML/HR: 900 INJECTION, SOLUTION INTRAVENOUS at 21:47

## 2022-03-04 RX ADMIN — GLIPIZIDE 10 MG: 5 TABLET ORAL at 21:38

## 2022-03-04 RX ADMIN — ASPIRIN 81 MG: 81 TABLET, CHEWABLE ORAL at 21:37

## 2022-03-04 RX ADMIN — BUDESONIDE AND FORMOTEROL FUMARATE DIHYDRATE 2 PUFF: 80; 4.5 AEROSOL RESPIRATORY (INHALATION) at 20:35

## 2022-03-04 RX ADMIN — MONTELUKAST 10 MG: 10 TABLET, FILM COATED ORAL at 21:38

## 2022-03-04 RX ADMIN — AMIODARONE HYDROCHLORIDE 1 MG/MIN: 50 INJECTION, SOLUTION INTRAVENOUS at 16:27

## 2022-03-04 RX ADMIN — CILOSTAZOL 50 MG: 50 TABLET ORAL at 21:38

## 2022-03-04 RX ADMIN — CARVEDILOL 6.25 MG: 6.25 TABLET, FILM COATED ORAL at 21:37

## 2022-03-04 RX ADMIN — HEPARIN SODIUM 6070 UNITS: 1000 INJECTION INTRAVENOUS; SUBCUTANEOUS at 16:22

## 2022-03-04 RX ADMIN — HEPARIN SODIUM 4040 UNITS: 1000 INJECTION INTRAVENOUS; SUBCUTANEOUS at 23:37

## 2022-03-04 RX ADMIN — HEPARIN SODIUM 12 UNITS/KG/HR: 5000 INJECTION, SOLUTION INTRAVENOUS at 16:22

## 2022-03-04 RX ADMIN — HEPARIN SODIUM 12 UNITS/KG/HR: 5000 INJECTION, SOLUTION INTRAVENOUS at 18:27

## 2022-03-04 NOTE — Clinical Note
Mallampati: Class II - soft palate, uvula, fauces visible. ASA: Class 2 - patient with mild systemic disease. Redundant Refill Request for Tramadol refused;    Outpatient Medication Detail      Disp Refills Start End ROBERT   traMADol (ULTRAM) 50 MG tablet 180 tablet 5 2/7/2019  No   Sig: TAKE 1-2 TABLETS BY MOUTH THREE TIMES DAILY AS NEEDED FOR PAIN   Class: Local Print   Order: [id number]   Outpatient Morphine Equivalent Daily Dose (MEDD)     2/7/19 and after   Unknown   Order Name Dose Route Frequency Maximum MEDD    traMADol (ULTRAM) 50 MG tablet     Unknown   Total Potential Daily Morphine Equivalence Unknown   An error was encountered while attempting to calculate the morphine equivalent daily dose for at least one order.    Calculation Information                Printout Tracking     External Result Report   Medication Administration Instructions     TAKE 1-2 TABLETS BY MOUTH THREE TIMES DAILY AS NEEDED FOR PAIN   Pharmacy     [facility] - [place], [place] -  [address]     Unable to complete prescription refill per RN Medication Refill Policy. Kareem Kincaid 3/4/2019 1:08 PM

## 2022-03-04 NOTE — Clinical Note
A venogram was performed on the left subclavian. Injected with single hand injection. Injection volume  = 15 mL.  preincision

## 2022-03-04 NOTE — Clinical Note
Contrast Dose Calculator:   Patient's age: 67.   Patient's sex: Male. Patient weight (kg) = 132.9. Creatinine level (mg/dL) = 1.2. Creatinine clearance (mL/min): 97.   Contrast concentration (mg/mL) = 370. MACD = 300 mL. Max Contrast dose per Creatinine Cl calculator = 218.25 mL.

## 2022-03-04 NOTE — Clinical Note
TRANSFER - IN REPORT:     Verbal report received from: [id number]. Report consisted of patient's Situation, Background, Assessment and   Recommendations(SBAR). Opportunity for questions and clarification was provided. Assessment completed upon patient's arrival to unit and care assumed. Patient transported with a Registered Nurse.

## 2022-03-04 NOTE — Clinical Note
Bilateral groin and left radial clipped prepped with ChloraPrep and draped. Wet prep elapsed drying time: 3 mins.

## 2022-03-04 NOTE — H&P
[facility] CARDIOLOGY History &Physical                 Primary Cardiologist: Dr Amie Hsu     Primary Care Physician: Erickson Saenz MD    Admitting Physician: Dr Kirkpatrick Cover:     Patient is a 67 y.o. male who presents with chest pain. He has a h/o htn, DM, NOMI on CPAP, COPD (severe) (smoked 1 ppd x 50 years quit in ), Covid w long hospitalization  to 3-2021 (had completely recovered), PAD, CAD w [facility]  w PCI to [place] [address], nuke  w EF 40% and moderately abnormal. No h/o CVA, TIA or bleeding. He had been doing well, went outside to cut up some tree branches, kept pulling the chain saw and couldn't get it to start. His arms got tired and he was very fatigued and went inside to rest.  He drank a glass of water and his arms started hurting and then he developed a dull constant pain across his chest radiating to his arms with diaphoresis but no nausea or SOB. Pain was constant 5/10. He took two  nitro with no relief of pain. No palpitations, dizziness or syncope. No LE edema though his legs hurt with ambulation and Rosy Crumb out' quickly. He checked his vital signs and HR was 220. Wife called 911 and EMS arrived and pt in monomorphic VT. Started IV amio with no improvement and IV lidocaine started and pt converted to a fib. EKG on arrival at ER a fib w rate 95 w QTc 405. Labs pending. Pt has no cardiac complaints at this time. /75. CXR pending. He is compliant with asa/plavix, no missed meds. Prior cardiac eval:  CAD w [facility]  w PCI to [address] [place] [address]  w EF 40% and moderately abnormal    Soc: smoked 1 ppd x 50 years quit in   FH:  Father w MI around age 76  Covid: Vax x 2 w booster     Past Medical History:   Diagnosis Date    Acute respiratory failure with hypoxia ([address] 10/23/2014    AGUSTIN (acute kidney injury) ([facility] 9/15/2014    Allergic rhinitis, CAUSE UNSPECIFIED 11/10/2015    Asthma; SEASONAL 11/10/2015    Benign essential hypertension 11/10/2015    Benign neoplasm of colon 11/10/2015    CAD (coronary artery disease) 1998, 1999    mix2, 3 stents [id number]    CAD (coronary artery disease) 11/10/2015    CAP (community acquired pneumonia) 12/31/2020    Cardiomyopathy ([address] 84/26/6495    Complicated wound infection 11/10/2015    COPD /OTHER 11/10/2015    COPD exacerbation ([address] 10/12/2011    COVID-19 12/31/2020    Diabetes mellitus type 2, controlled ([address] 11/10/2015    Diabetic neuropathy  11/10/2015    Disruption of wound, unspecified 10/9/2014    Encounter for long-term (current) use of other high-risk medications 11/10/2015    Extremity atherosclerosis with intermittent claudication ([address] 11/10/2015    H/O endarterectomy; 9/2014 11/10/2015    Hiatal hernia 11/10/2015    Hyperglycemia  11/10/2015    Hyperlipidemia 11/10/2015    Monomorphic ventricular tachycardia ([address] 12/31/2020    Myocardial infarction ([address] 1999    Myocardial infarction ([address] (1999) 11/10/2015    Obesity, UNSPECIFIED 11/10/2015    Orthostatic hypotension 11/10/2015    Osteoarthritis 11/10/2015    Peripheral vascular disease ([address] 2/2014; S/P BILAT FEMORAL 11/10/2015    PNA (pneumonia) 2/8/2019    PVC (premature ventricular contraction)     Rash 22/56/5969    Seroma complicating a procedure 10/2/2014    Sleep apnea     cpap    Uncontrolled type 2 diabetes mellitus ([address] 11/10/2015    Vertiginous syndromes & LABYRINTHINE DISORDERS/UNSPEC 11/10/2015      Past Surgical History:   Procedure Laterality Date    HX CORONARY STENT PLACEMENT  1999    HX CORONARY STENT PLACEMENT  12/05/2018    LCX OM1:2.5 x 12 Yoel MIRELA    HX HEART CATHETERIZATION  12/05/2018    LV EF=40%. LM:nl. LAD:30-40% mid stenosis. LCX OM1:95% stenosis. RCA:100% occlusion at RV branch.     ME ABDOMEN SURGERY PROC UNLISTED  1960    abdominal cyst removed    ME CARDIAC SURG PROCEDURE UNLIST  1999    stents x3    VASCULAR SURGERY PROCEDURE UNLIST  9/11/14    sally femoral endarterectomy    VASCULAR SURGERY PROCEDURE UNLIST  11/5/14    Repair of right common femoral artery       Allergies   Allergen Reactions    Azithromycin Hives    Daypro [Oxaprozin] Other (comments)     ELEVATED BLOOD PRESSURE     Social History     Tobacco Use    Smoking status: Former Smoker     Packs/day: 1.00     Years: 50.00     Pack years: 50.00     Quit date: 10/2/2011     Years since quitting: 10.4    Smokeless tobacco: Current User     Types: Chew   Substance Use Topics    Alcohol use: Yes     Alcohol/week: 0.0 standard drinks     Comment: rare      FH:   Family History   Problem Relation Age of Onset   Lemons Stroke Mother     Hypertension Mother     Breast Cancer Mother     Heart Attack Father     Heart Disease Father     Other Father         DIVERTICULITIS    Lung Disease Brother         mesothioloma    Diabetes Sister     Crohn's Disease Sister         Review of Systems  General: no weight change, + today weakness, fever or chills  Skin: no rashes, lumps, or other skin changes  HEENT: no headache, dizziness, lightheadedness, vision changes, hearing changes, tinnitus, vertigo, sinus pressure/pain, bleeding gums, sore throat, or hoarseness  Neck: no swollen glands, goiter, pain or stiffness  Respiratory: no cough, sputum, hemoptysis, no dyspnea, no wheezing  Cardiovascular: + as per HPI  Gastrointestinal: no reflux, constipation, diarrhea, liver problems, GI bleeding  Urinary: no frequency, urgency , hematuria, burning/pain with urination, recent flank pain, polyuria, nocturia, or difficulty urinating  Peripheral Vascular: + leg pain w PAD  Musculoskeletal: no muscle or joint pain/stiffness, joint swelling, erythema of joints, or back pain  Psychiatric: no depression or excessive stress  Neurological: no sensory or motor loss, seizures, syncope, tremors, numbness, tingling, no changes in mood, attention, or speech, no changes in orientation, memory, insight, or judgment.    Hematologic: no anemia, easy bruising or bleeding  Endocrine: no thyroid problems, no heat or cold intolerance, excessive sweating, polyuria, polydipsia, + diabetes. Objective:       Visit Vitals  /75 (BP 1 Location: Right upper arm, BP Patient Position: At rest)   Pulse (!) 109   Temp 98.2 °F (36.8 °C)   Resp 15   Ht 6' 1\" (1.854 m)   Wt 132.9 kg (293 lb)   SpO2 97%   BMI 38.66 kg/m²       No intake/output data recorded. No intake/output data recorded.     Physical Exam:  General: Well Developed, Well Nourished, No Acute Distress, RA  Head: normocephalic atraumatic   ENT: pupils equal and round, no abnormalities noted  Neck: supple, no JVD, no carotid bruits  Heart: S1S2 irregular   Lungs: Barrel chested, no wheezing  Abd: soft, nontender, nondistended, with good bowel sounds  Ext: warm, chronic venous stasis   Skin: warm and dry  Psychiatric: Normal mood and affect, A&O x 3  Neurologic: Normal muscle tone      ECG: a fib w rate 95 w QTc 405    Data Review:      Recent Results (from the past 24 hour(s))   EKG, 12 LEAD, INITIAL    Collection Time: 03/04/22  3:53 PM   Result Value Ref Range    Ventricular Rate 95 BPM    Atrial Rate 0 BPM    QRS Duration 105 ms    Q-T Interval 322 ms    QTC Calculation (Bezet) 405 ms    Calculated R Axis 89 degrees    Calculated T Axis -66 degrees    Diagnosis       Atrial fibrillation  Ventricular premature complex  Borderline right axis deviation  Borderline repolarization abnormality     CBC WITH AUTOMATED DIFF    Collection Time: 03/04/22  3:57 PM   Result Value Ref Range    WBC 13.4 (H) 4.3 - 11.1 K/uL    RBC 3.69 (L) 4.23 - 5.6 M/uL    HGB 12.1 (L) 13.6 - 17.2 g/dL    HCT 36.1 (L) 41.1 - 50.3 %    MCV 97.8 79.6 - 97.8 FL    MCH 32.8 26.1 - 32.9 PG    MCHC 33.5 31.4 - 35.0 g/dL    RDW 15.4 (H) 11.9 - 14.6 %    PLATELET 075 542 - 736 K/uL    MPV 11.3 9.4 - 12.3 FL    ABSOLUTE NRBC 0.00 0.0 - 0.2 K/uL    DF AUTOMATED      NEUTROPHILS 87 (H) 43 - 78 %    LYMPHOCYTES 5 (L) 13 - 44 %    MONOCYTES 6 4.0 - 12.0 % EOSINOPHILS 0 (L) 0.5 - 7.8 %    BASOPHILS 0 0.0 - 2.0 %    IMMATURE GRANULOCYTES 1 0.0 - 5.0 %    ABS. NEUTROPHILS 11.7 (H) 1.7 - 8.2 K/UL    ABS. LYMPHOCYTES 0.6 0.5 - 4.6 K/UL    ABS. MONOCYTES 0.9 0.1 - 1.3 K/UL    ABS. EOSINOPHILS 0.0 0.0 - 0.8 K/UL    ABS. BASOPHILS 0.1 0.0 - 0.2 K/UL    ABS. IMM. GRANS. 0.1 0.0 - 0.5 K/UL       CXR: pending    Assessment/Plan:   Ventricular tachycardia ([facility] (3/4/2022)- h/o PVCs now w symptomatic sustained monomorphic VT  - admit  - cont ASA, plavix, ARB, statin  - cont IV amio  - add IV heparin drip  - check echo  - trend troponin, check labs CXR  - plan for LHC in AM   - add BB    Coronary artery disease involving native coronary artery of native heart without angina pectoris (10/12/2011)- as above    Atrial fib- new dx  - IV amio  - IV heparin    HTN (hypertension) (10/12/2011)  - hold hctz and norvasc  - cont ARB, add BB    Severe obesity (BMI 35.0-39. 9) with comorbidity ([facility] (10/12/2011)    DM (diabetes mellitus) type II, controlled, with peripheral vascular disorder ([facility] (9/11/2014)  - hold glucophage  - cont Brendia Sax  - cover w sliding scale     COPD- cont home meds      Kassie Rehman PA-C  3/4/2022  4:19 PM

## 2022-03-04 NOTE — ED TRIAGE NOTES
Pt arrives via ems from home in [place]. Pt called out for cp and shob. Pt found to be in monomorphic vtach and was given amiodarone with no improvement. Started on Lidocaine en route. Pt initial bp 80/60 improved to 119/82 after lidocaine. Pt a/o x 4.CP resolved at this time. Pt states prior to ems he was attempting to start a chain saw. Pt states shob resolved at this time. 95 % RA. 21 G LAC 18G RAC both placed by EMS.

## 2022-03-04 NOTE — ED PROVIDER NOTES
Mat Adamson is a 67 y.o. male seen on 3/4/2022 in the [facility] EMERGENCY DEPT in room RR2/RR2. Chief Complaint   Patient presents with    Chest Pain     HPI: 14-year-old  male presented to the emergency department via EMS with complaints of fast heart rate, chest tightness, shortness of breath and diaphoresis that began approximately 2 and half hours prior to arrival.  Per EMS, patient was found to be in VT at 220 bpm.  Patient does have a history of VT in the past.  He also has history of CAD and hypomagnesemia. Patient symptoms started when he was trying to crank a chainsaw. He stated that he pulled and pulled and pulled but could not get it to start and then he started feeling the symptoms in his chest.  Patient was given 150 mg of amiodarone in route without any change in patient's symptoms. His blood pressure was 80/50. EMS decided to start patient on lidocaine drip as opposed to cardioverting patient and patient converted on his own with the lidocaine drip. Patient states that he feels great now and he is ready to go home. His chest tightness, shortness of breath have all resolved. Patient denies any recent illnesses including but not limited to fevers, chills, nausea, vomiting or any other concerns. Historian: Patient/EMS    REVIEW OF SYSTEMS     Review of Systems   Constitutional: Positive for diaphoresis. HENT: Negative. Respiratory: Positive for chest tightness and shortness of breath. Cardiovascular: Positive for chest pain. Gastrointestinal: Negative. Genitourinary: Negative. Musculoskeletal: Negative. Skin: Negative. Neurological: Negative. Psychiatric/Behavioral: Negative. All other systems reviewed and are negative.       PAST MEDICAL HISTORY     Past Medical History:   Diagnosis Date    Acute respiratory failure with hypoxia ([facility] 10/23/2014    AGUSTIN (acute kidney injury) ([facility] 9/15/2014    Allergic rhinitis, CAUSE UNSPECIFIED 11/10/2015    Asthma; SEASONAL 11/10/2015    Benign essential hypertension 11/10/2015    Benign neoplasm of colon 11/10/2015    CAD (coronary artery disease) 1998, 1999    mix2, 3 stents [id number]    CAD (coronary artery disease) 11/10/2015    CAP (community acquired pneumonia) 12/31/2020    Cardiomyopathy ([address] 49/26/7694    Complicated wound infection 11/10/2015    COPD /OTHER 11/10/2015    COPD exacerbation ([address] 10/12/2011    COVID-19 12/31/2020    Diabetes mellitus type 2, controlled ([address] 11/10/2015    Diabetic neuropathy  11/10/2015    Disruption of wound, unspecified 10/9/2014    Encounter for long-term (current) use of other high-risk medications 11/10/2015    Extremity atherosclerosis with intermittent claudication ([address] 11/10/2015    H/O endarterectomy; 9/2014 11/10/2015    Hiatal hernia 11/10/2015    Hyperglycemia  11/10/2015    Hyperlipidemia 11/10/2015    Monomorphic ventricular tachycardia ([address] 12/31/2020    Myocardial infarction ([address] 1999    Myocardial infarction ([address] (1999) 11/10/2015    Obesity, UNSPECIFIED 11/10/2015    Orthostatic hypotension 11/10/2015    Osteoarthritis 11/10/2015    Peripheral vascular disease ([address] 2/2014; S/P BILAT FEMORAL 11/10/2015    PNA (pneumonia) 2/8/2019    PVC (premature ventricular contraction)     Rash 32/65/9080    Seroma complicating a procedure 10/2/2014    Sleep apnea     cpap    Uncontrolled type 2 diabetes mellitus ([address] 11/10/2015    Vertiginous syndromes & LABYRINTHINE DISORDERS/UNSPEC 11/10/2015     Past Surgical History:   Procedure Laterality Date    HX CORONARY STENT PLACEMENT  1999    HX CORONARY STENT PLACEMENT  12/05/2018    LCX OM1:2.5 x 12 Yoel MIRELA    HX HEART CATHETERIZATION  12/05/2018    LV EF=40%. LM:nl. LAD:30-40% mid stenosis. LCX OM1:95% stenosis. RCA:100% occlusion at RV branch.     RI ABDOMEN SURGERY PROC UNLISTED  1960    abdominal cyst removed    RI CARDIAC SURG PROCEDURE UNLIST  1999    stents x3    VASCULAR SURGERY PROCEDURE UNLIST  9/11/14    sally femoral endarterectomy    VASCULAR SURGERY PROCEDURE UNLIST  11/5/14    Repair of right common femoral artery      Social History     Socioeconomic History    Marital status:    Occupational History    Occupation: retired    Occupation: [employer]     Comment: Positive for Asbestos exposure    Occupation: [address] [employer]   Tobacco Use    Smoking status: Former Smoker     Packs/day: 1.00     Years: 50.00     Pack years: 50.00     Quit date: 10/2/2011     Years since quitting: 10.4    Smokeless tobacco: Current User     Types: Chew   Substance and Sexual Activity    Alcohol use: Yes     Alcohol/week: 0.0 standard drinks     Comment: rare    Drug use: No   Social History Narrative    Lives with wife     Prior to Admission Medications   Prescriptions Last Dose Informant Patient Reported? Taking? FISH OIL CONCENTRATE 1,000 mg cap   Yes No   Sig: Take 1-2 Caps by mouth two (2) times a day. One tab in am & 2 tabs in pm   Januvia 100 mg tablet   No No   Sig: TAKE ONE TABLET BY MOUTH EVERY DAY   MULTIVITS-MINERALS/FA/LYCOPENE (ONE-A-DAY MEN'S MULTIVITAMIN PO)   Yes No   Sig: Take 1 Tab by mouth daily. albuterol (PROVENTIL HFA, VENTOLIN HFA, PROAIR HFA) 90 mcg/actuation inhaler   No No   Sig: USE 2 PUFFS BY INHALATION 4 TIMES DAILY AS NEEDED FOR WHEEZING OR SHORTNESS OF BREATH.   amLODIPine-valsartan (EXFORGE)  mg per tablet   No No   Sig: TAKE 1/2 TO 1 TABLET BY MOUTH EVERY DAY   ascorbic acid, vitamin C, (VITAMIN C) 500 mg tablet   Yes No   Sig: Take 500 mg by mouth nightly. aspirin 81 mg tablet   Yes No   Sig: Take 81 mg by mouth nightly. cholecalciferol, vitamin D3, (VITAMIN D3) 2,000 unit tab   Yes No   Sig: Take 2,000 Units by mouth nightly. cilostazoL (PLETAL) 50 mg tablet   No No   Sig: TAKE 1 TABLET BY MOUTH TWICE DAILY.    clopidogreL (PLAVIX) 75 mg tab   No No   Sig: TAKE ONE TABLET BY MOUTH EVERY DAY   cpap machine kit   Yes No   Sig: Take  by inhalation. qhs   fluticasone propion-salmeteroL (Advair Diskus) 500-50 mcg/dose diskus inhaler   No No   Sig: Take 1 Puff by inhalation two (2) times a day. RINSE MOUTH WELL AFTER USE   fluticasone propionate (FLONASE) 50 mcg/actuation nasal spray   No No   Sig: USE 1 OR 2 SPRAYS IN EACH NOSTRIL EVERY DAY   furosemide (LASIX) 40 mg tablet   No No   Sig: TAKE 1 TABLET BY MOUTH EVERY DAY   Patient not taking: Reported on 5766   garlic 3,563 mg cap   Yes No   Sig: Take 1 Tab by mouth two (2) times a day. glipiZIDE SR (GLUCOTROL XL) 10 mg CR tablet   No No   Sig: TAKE 1 TABLET BY MOUTH TWICE DAILY   glucosamine/chondr funez A sod (GLUCOSAMINE-CHONDROITIN) 750-600 mg tab   Yes No   Sig: Take  by mouth two (2) times a day. glucose blood VI test strips (ASCENSIA AUTODISC VI, ONE TOUCH ULTRA TEST VI) strip   No No   Sig: E11.9   guaiFENesin (MUCINEX) 1,200 mg Ta12 ER tablet   Yes No   Sig: Take 1,200 mg by mouth two (2) times daily as needed. hydroCHLOROthiazide (HYDRODIURIL) 25 mg tablet   No No   Sig: TAKE 1/2 TO 1 TABLET BY MOUTH EVERY DAY   magnesium oxide (MAG-OX) 400 mg tablet   No No   Sig: TAKE 1 TABLET BY MOUTH EVERY DAY   metFORMIN (GLUCOPHAGE) 500 mg tablet   No No   Sig: TAKE TWO TABLETS BY MOUTH 2 TIMES A DAY   montelukast (SINGULAIR) 10 mg tablet   No No   Sig: TAKE 1 TABLET BY MOUTH EVERY DAY AT BEDTIME   nitroglycerin (NITROSTAT) 0.4 mg SL tablet   Yes No   Si.4 mg by SubLINGual route every five (5) minutes as needed for Chest Pain.   polyethylene glycol (MIRALAX) 17 gram packet   No No   Sig: Take 1 Packet by mouth daily.    rosuvastatin (CRESTOR) 10 mg tablet   No No   Sig: TAKE ONE TABLET BY MOUTH ONCE A DAY   trimethoprim-sulfamethoxazole (BACTRIM DS, SEPTRA DS) 160-800 mg per tablet   No No   Sig: TAKE 1 TABLET BY MOUTH EVERY DAY      Facility-Administered Medications: None     Allergies   Allergen Reactions    Azithromycin Hives    Daypro [Oxaprozin] Other (comments)     ELEVATED BLOOD PRESSURE        PHYSICAL EXAM       Vitals:    03/04/22 1553   BP: 111/75   Pulse: (!) 109   Resp: 15   Temp: 98.2 °F (36.8 °C)   SpO2: 97%    Vital signs were reviewed. Physical Exam  Vitals and nursing note reviewed. Constitutional:       General: He is not in acute distress. Appearance: He is well-developed. He is not ill-appearing or toxic-appearing. HENT:      Head: Normocephalic and atraumatic. Eyes:      Extraocular Movements: Extraocular movements intact. Pupils: Pupils are equal, round, and reactive to light. Cardiovascular:      Rate and Rhythm: Tachycardia present. Rhythm irregular. Heart sounds: Normal heart sounds. Pulmonary:      Effort: Pulmonary effort is normal.      Breath sounds: Normal breath sounds. Abdominal:      Palpations: Abdomen is soft. Tenderness: There is no abdominal tenderness. There is no guarding or rebound. Musculoskeletal:         General: Normal range of motion. Cervical back: Normal range of motion. Right lower leg: No tenderness. No edema. Left lower leg: No tenderness. No edema. Skin:     General: Skin is warm and dry. Neurological:      General: No focal deficit present. Mental Status: He is alert and oriented to person, place, and time.    Psychiatric:         Mood and Affect: Mood normal.         Behavior: Behavior normal.          MEDICAL DECISION MAKING     ED Course:    Orders Placed This Encounter    XR Chest Port (check if patient is roomed and on cardiac monitor)    Troponin - High Sensitivity    Troponin 2 Hour Repeat    CBC    CMP    LIPASE    Magnesium    PROTHROMBIN TIME + INR    PTT    NT-PRO BNP    Cardiac Monitoring    PULSE OXIMETRY CONTINUOUS    NURSING-MISCELLANEOUS: Please draw blue top tube and send to lab ONE TIME    EKG    EKG, 12 LEAD, INITIAL    SALINE LOCK IV ONE TIME Routine    INSERT PERIPHERAL IV ONE TIME STAT    sodium chloride (NS) flush 5-10 mL    sodium chloride (NS) flush 5-10 mL    amiodarone (CORDARONE) 150 mg in dextrose 5% 100 mL bolus infusion    DISCONTD: amiodarone (CORDARONE) 450 mg in dextrose 5% 250 mL infusion    amiodarone (CORDARONE) 450 mg in dextrose 5% 250 mL infusion (V2B)    heparin (porcine) 1,000 unit/mL injection 6,070 Units    heparin 25,000 units in dextrose 500 mL infusion    INITIAL PHYSICIAN ORDER: INPATIENT Telemetry; Yes; 3. Patient receiving treatment that can only be provided in an inpatient setting (further clarification in H&P documentation)     Recent Results (from the past 8 hour(s))   EKG, 12 LEAD, INITIAL    Collection Time: 03/04/22  3:53 PM   Result Value Ref Range    Ventricular Rate 95 BPM    Atrial Rate 0 BPM    QRS Duration 105 ms    Q-T Interval 322 ms    QTC Calculation (Bezet) 405 ms    Calculated R Axis 89 degrees    Calculated T Axis -66 degrees    Diagnosis       Atrial fibrillation  Ventricular premature complex  Borderline right axis deviation  Borderline repolarization abnormality     TROPONIN-HIGH SENSITIVITY    Collection Time: 03/04/22  3:57 PM   Result Value Ref Range    Troponin-High Sensitivity 13.1 0 - 14 pg/mL   CBC WITH AUTOMATED DIFF    Collection Time: 03/04/22  3:57 PM   Result Value Ref Range    WBC 13.4 (H) 4.3 - 11.1 K/uL    RBC 3.69 (L) 4.23 - 5.6 M/uL    HGB 12.1 (L) 13.6 - 17.2 g/dL    HCT 36.1 (L) 41.1 - 50.3 %    MCV 97.8 79.6 - 97.8 FL    MCH 32.8 26.1 - 32.9 PG    MCHC 33.5 31.4 - 35.0 g/dL    RDW 15.4 (H) 11.9 - 14.6 %    PLATELET 301 549 - 157 K/uL    MPV 11.3 9.4 - 12.3 FL    ABSOLUTE NRBC 0.00 0.0 - 0.2 K/uL    DF AUTOMATED      NEUTROPHILS 87 (H) 43 - 78 %    LYMPHOCYTES 5 (L) 13 - 44 %    MONOCYTES 6 4.0 - 12.0 %    EOSINOPHILS 0 (L) 0.5 - 7.8 %    BASOPHILS 0 0.0 - 2.0 %    IMMATURE GRANULOCYTES 1 0.0 - 5.0 %    ABS. NEUTROPHILS 11.7 (H) 1.7 - 8.2 K/UL    ABS. LYMPHOCYTES 0.6 0.5 - 4.6 K/UL    ABS.  MONOCYTES 0.9 0.1 - 1.3 K/UL    ABS. EOSINOPHILS 0.0 0.0 - 0.8 K/UL    ABS. BASOPHILS 0.1 0.0 - 0.2 K/UL    ABS. IMM. GRANS. 0.1 0.0 - 0.5 K/UL   METABOLIC PANEL, COMPREHENSIVE    Collection Time: 03/04/22  3:57 PM   Result Value Ref Range    Sodium 139 136 - 145 mmol/L    Potassium 4.5 3.5 - 5.1 mmol/L    Chloride 107 98 - 107 mmol/L    CO2 24 21 - 32 mmol/L    Anion gap 8 7 - 16 mmol/L    Glucose 194 (H) 65 - 100 mg/dL    BUN 23 8 - 23 MG/DL    Creatinine 1.60 (H) 0.8 - 1.5 MG/DL    GFR est AA 54 (L) >60 ml/min/1.73m2    GFR est non-AA 45 (L) >60 ml/min/1.73m2    Calcium 9.1 8.3 - 10.4 MG/DL    Bilirubin, total 0.6 0.2 - 1.1 MG/DL    ALT (SGPT) 35 12 - 65 U/L    AST (SGOT) 19 15 - 37 U/L    Alk. phosphatase 57 50 - 136 U/L    Protein, total 7.0 6.3 - 8.2 g/dL    Albumin 4.0 3.2 - 4.6 g/dL    Globulin 3.0 2.3 - 3.5 g/dL    A-G Ratio 1.3 1.2 - 3.5     LIPASE    Collection Time: 03/04/22  3:57 PM   Result Value Ref Range    Lipase 174 73 - 393 U/L   MAGNESIUM    Collection Time: 03/04/22  3:57 PM   Result Value Ref Range    Magnesium 2.1 1.8 - 2.4 mg/dL   PROTHROMBIN TIME + INR    Collection Time: 03/04/22  3:57 PM   Result Value Ref Range    Prothrombin time 15.6 (H) 12.6 - 14.5 sec    INR 1.2     PTT    Collection Time: 03/04/22  3:57 PM   Result Value Ref Range    aPTT 24.3 24.1 - 35.1 SEC   NT-PRO BNP    Collection Time: 03/04/22  3:57 PM   Result Value Ref Range    NT pro-BNP 1,240 (H) <450 PG/ML     XR CHEST PORT    Result Date: 3/4/2022  History: Chest pain Exam: portable chest Comparison: 9/1/2021 Findings: No new alveolar infiltrate or pleural effusion. There is coarsening of the interstitial lung markings. The mediastinal contour and osseous structures are normal. IMPRESSIONs: Stable portable chest     EKG interpretation personally: Rate 95. Atrial fibrillation. PVCs.   Normal QT interval.       MDM  Number of Diagnoses or Management Options  Atrial fibrillation with RVR (HCC)  Ventricular tachycardia (HCC)  Diagnosis management comments: 48-year-old male presented emergency department via EMS with palpitations and chest pressure. Patient initial rhythm VT at 220. Patient given amiodarone and lidocaine by EMS and patient converted to A. fib with rapid ventricular response. Patient given amiodarone drip bolus and started on heparin with bolus as well. Patient now stable and admitted to cardiology. Amount and/or Complexity of Data Reviewed  Clinical lab tests: ordered and reviewed  Tests in the radiology section of CPT®: ordered and reviewed  Decide to obtain previous medical records or to obtain history from someone other than the patient: yes  Obtain history from someone other than the patient: yes  Review and summarize past medical records: yes  Discuss the patient with other providers: yes  Independent visualization of images, tracings, or specimens: yes    Patient Progress  Patient progress: improved    Critical Care  Performed by: Josue Sawyer DO  Authorized by: Josue Sawyer DO     Critical care provider statement:     Critical care time (minutes):  37    Critical care was necessary to treat or prevent imminent or life-threatening deterioration of the following conditions:  Cardiac failure    Critical care was time spent personally by me on the following activities:  Blood draw for specimens, development of treatment plan with patient or surrogate, discussions with consultants, evaluation of patient's response to treatment, examination of patient, obtaining history from patient or surrogate, ordering and performing treatments and interventions, ordering and review of laboratory studies, ordering and review of radiographic studies, pulse oximetry, re-evaluation of patient's condition and review of old charts  Comments:      48-year-old male presented with VT. Converted with lidocaine drip per EMS, now new onset atrial fibrillation with rapid ventricular response.   Patient cardiology. Disposition:  Admitted  Diagnosis:     ICD-10-CM ICD-9-CM   1. Ventricular tachycardia (HCC)  I47.2 427.1   2. Atrial fibrillation with RVR (HCC)  I48.91 427.31     ____________________________________________________________________  A portion of this note was generated using voice recognition dictation software. While the note has been reviewed for accuracy, please note certain words and phrases may not be transcribed as intended and some grammatical and/or typographical errors may be present.

## 2022-03-04 NOTE — Clinical Note
Pacemaker pocket closed with 3-0 stratafix and 4-0 vloc. Dermabond applied to incision and covered with primaseal dressing.

## 2022-03-04 NOTE — Clinical Note
Sheath #1: Sheath: inserted. Sheath inserted/placed in the left axillary  vein. Upon evaluation of the common femoral artery stick using fluoroscopy, the access site puncture was within the safe zone.  8f safesheath

## 2022-03-04 NOTE — Clinical Note
TRANSFER - OUT REPORT:     Verbal report given to: Tobin Vargas rn. Report consisted of patient's Situation, Background, Assessment and   Recommendations(SBAR). Opportunity for questions and clarification was provided. Patient transported with a Registered Nurse and [address] [place] [address] / [facility]. Patient transported to: [place] [address].

## 2022-03-04 NOTE — Clinical Note
Sheath #2: Sheath: inserted. Sheath inserted/placed in the left axillary  vein. Upon evaluation of the common femoral artery stick using fluoroscopy, the access site puncture was within the safe zone.  6f safesheath

## 2022-03-04 NOTE — ED NOTES
TRANSFER - OUT REPORT:    Verbal report given to Karl Manzano RN(name) on [facility]  being transferred to TELE(unit) for routine progression of care       Report consisted of patients Situation, Background, Assessment and   Recommendations(SBAR). Information from the following report(s) ED Summary was reviewed with the receiving nurse. Lines:   Peripheral IV 03/04/22 Distal;Left Antecubital (Active)   Site Assessment Clean, dry, & intact 03/04/22 1552       Peripheral IV 03/04/22 Right Antecubital (Active)        Opportunity for questions and clarification was provided.       Patient transported with:   Monitor  Patient-specific medications from Pharmacy  Registered Nurse

## 2022-03-05 ENCOUNTER — APPOINTMENT (OUTPATIENT)
Dept: NON INVASIVE DIAGNOSTICS | Age: 77
DRG: 224 | End: 2022-03-05
Attending: PHYSICIAN ASSISTANT
Payer: MEDICARE

## 2022-03-05 ENCOUNTER — APPOINTMENT (OUTPATIENT)
Dept: CARDIAC CATH/INVASIVE PROCEDURES | Age: 77
DRG: 224 | End: 2022-03-05
Attending: INTERNAL MEDICINE
Payer: MEDICARE

## 2022-03-05 LAB
ANION GAP SERPL CALC-SCNC: 6 MMOL/L (ref 7–16)
ATRIAL RATE: 0 BPM
ATRIAL RATE: 71 BPM
BUN SERPL-MCNC: 21 MG/DL (ref 8–23)
CALCIUM SERPL-MCNC: 8.8 MG/DL (ref 8.3–10.4)
CALCULATED P AXIS, ECG09: 68 DEGREES
CALCULATED R AXIS, ECG10: 102 DEGREES
CALCULATED R AXIS, ECG10: 89 DEGREES
CALCULATED T AXIS, ECG11: -48 DEGREES
CALCULATED T AXIS, ECG11: -66 DEGREES
CHLORIDE SERPL-SCNC: 109 MMOL/L (ref 98–107)
CHOLEST SERPL-MCNC: 77 MG/DL
CO2 SERPL-SCNC: 25 MMOL/L (ref 21–32)
CREAT SERPL-MCNC: 1.2 MG/DL (ref 0.8–1.5)
DIAGNOSIS, 93000: NORMAL
DIAGNOSIS, 93000: NORMAL
ECHO EST RA PRESSURE: 15 MMHG
ECHO RIGHT VENTRICULAR SYSTOLIC PRESSURE (RVSP): 24 MMHG
ECHO TV REGURGITANT MAX VELOCITY: 1.47 M/S
ECHO TV REGURGITANT PEAK GRADIENT: 9 MMHG
GLUCOSE BLD STRIP.AUTO-MCNC: 121 MG/DL (ref 65–100)
GLUCOSE BLD STRIP.AUTO-MCNC: 129 MG/DL (ref 65–100)
GLUCOSE BLD STRIP.AUTO-MCNC: 129 MG/DL (ref 65–100)
GLUCOSE BLD STRIP.AUTO-MCNC: 154 MG/DL (ref 65–100)
GLUCOSE BLD STRIP.AUTO-MCNC: 158 MG/DL (ref 65–100)
GLUCOSE SERPL-MCNC: 115 MG/DL (ref 65–100)
HDLC SERPL-MCNC: 45 MG/DL (ref 40–60)
HDLC SERPL: 1.7 {RATIO}
LDLC SERPL CALC-MCNC: 16.4 MG/DL
MAGNESIUM SERPL-MCNC: 2.2 MG/DL (ref 1.8–2.4)
P-R INTERVAL, ECG05: 274 MS
POTASSIUM SERPL-SCNC: 4.1 MMOL/L (ref 3.5–5.1)
Q-T INTERVAL, ECG07: 322 MS
Q-T INTERVAL, ECG07: 418 MS
QRS DURATION, ECG06: 102 MS
QRS DURATION, ECG06: 105 MS
QTC CALCULATION (BEZET), ECG08: 405 MS
QTC CALCULATION (BEZET), ECG08: 454 MS
SERVICE CMNT-IMP: ABNORMAL
SODIUM SERPL-SCNC: 140 MMOL/L (ref 136–145)
TRIGL SERPL-MCNC: 78 MG/DL (ref 35–150)
UFH PPP CHRO-ACNC: 0.27 IU/ML (ref 0.3–0.7)
VENTRICULAR RATE, ECG03: 71 BPM
VENTRICULAR RATE, ECG03: 95 BPM
VLDLC SERPL CALC-MCNC: 15.6 MG/DL (ref 6–23)

## 2022-03-05 PROCEDURE — 36415 COLL VENOUS BLD VENIPUNCTURE: CPT

## 2022-03-05 PROCEDURE — 74011250636 HC RX REV CODE- 250/636: Performed by: INTERNAL MEDICINE

## 2022-03-05 PROCEDURE — 99152 MOD SED SAME PHYS/QHP 5/>YRS: CPT | Performed by: INTERNAL MEDICINE

## 2022-03-05 PROCEDURE — 94760 N-INVAS EAR/PLS OXIMETRY 1: CPT

## 2022-03-05 PROCEDURE — 74011000258 HC RX REV CODE- 258: Performed by: PHYSICIAN ASSISTANT

## 2022-03-05 PROCEDURE — 74011250637 HC RX REV CODE- 250/637: Performed by: PHYSICIAN ASSISTANT

## 2022-03-05 PROCEDURE — 74011000250 HC RX REV CODE- 250: Performed by: PHYSICIAN ASSISTANT

## 2022-03-05 PROCEDURE — 85520 HEPARIN ASSAY: CPT

## 2022-03-05 PROCEDURE — 93320 DOPPLER ECHO COMPLETE: CPT | Performed by: INTERNAL MEDICINE

## 2022-03-05 PROCEDURE — 93005 ELECTROCARDIOGRAM TRACING: CPT | Performed by: INTERNAL MEDICINE

## 2022-03-05 PROCEDURE — 65660000000 HC RM CCU STEPDOWN

## 2022-03-05 PROCEDURE — 77030004558 HC CATH ANGI DX SUPR TORQ CARD -A: Performed by: INTERNAL MEDICINE

## 2022-03-05 PROCEDURE — 74011000250 HC RX REV CODE- 250: Performed by: INTERNAL MEDICINE

## 2022-03-05 PROCEDURE — 93325 DOPPLER ECHO COLOR FLOW MAPG: CPT | Performed by: INTERNAL MEDICINE

## 2022-03-05 PROCEDURE — C1894 INTRO/SHEATH, NON-LASER: HCPCS | Performed by: INTERNAL MEDICINE

## 2022-03-05 PROCEDURE — 99024 POSTOP FOLLOW-UP VISIT: CPT | Performed by: INTERNAL MEDICINE

## 2022-03-05 PROCEDURE — 5A2204Z RESTORATION OF CARDIAC RHYTHM, SINGLE: ICD-10-PCS | Performed by: INTERNAL MEDICINE

## 2022-03-05 PROCEDURE — 74011250636 HC RX REV CODE- 250/636: Performed by: PHYSICIAN ASSISTANT

## 2022-03-05 PROCEDURE — 99153 MOD SED SAME PHYS/QHP EA: CPT

## 2022-03-05 PROCEDURE — 93312 ECHO TRANSESOPHAGEAL: CPT | Performed by: INTERNAL MEDICINE

## 2022-03-05 PROCEDURE — 82962 GLUCOSE BLOOD TEST: CPT

## 2022-03-05 PROCEDURE — 77030022513 HC GD NDL ACUSIT CIVC -B: Performed by: INTERNAL MEDICINE

## 2022-03-05 PROCEDURE — B2111ZZ FLUOROSCOPY OF MULTIPLE CORONARY ARTERIES USING LOW OSMOLAR CONTRAST: ICD-10-PCS | Performed by: INTERNAL MEDICINE

## 2022-03-05 PROCEDURE — C1769 GUIDE WIRE: HCPCS | Performed by: INTERNAL MEDICINE

## 2022-03-05 PROCEDURE — 80061 LIPID PANEL: CPT

## 2022-03-05 PROCEDURE — 93312 ECHO TRANSESOPHAGEAL: CPT

## 2022-03-05 PROCEDURE — 74011000636 HC RX REV CODE- 636: Performed by: INTERNAL MEDICINE

## 2022-03-05 PROCEDURE — B24BZZ4 ULTRASONOGRAPHY OF HEART WITH AORTA, TRANSESOPHAGEAL: ICD-10-PCS | Performed by: INTERNAL MEDICINE

## 2022-03-05 PROCEDURE — 93458 L HRT ARTERY/VENTRICLE ANGIO: CPT | Performed by: INTERNAL MEDICINE

## 2022-03-05 PROCEDURE — 77030015766: Performed by: INTERNAL MEDICINE

## 2022-03-05 PROCEDURE — 99153 MOD SED SAME PHYS/QHP EA: CPT | Performed by: INTERNAL MEDICINE

## 2022-03-05 PROCEDURE — 4A023N7 MEASUREMENT OF CARDIAC SAMPLING AND PRESSURE, LEFT HEART, PERCUTANEOUS APPROACH: ICD-10-PCS | Performed by: INTERNAL MEDICINE

## 2022-03-05 PROCEDURE — 80048 BASIC METABOLIC PNL TOTAL CA: CPT

## 2022-03-05 PROCEDURE — 2709999900 HC NON-CHARGEABLE SUPPLY

## 2022-03-05 PROCEDURE — 77030042317 HC BND COMPR HEMSTAT -B: Performed by: INTERNAL MEDICINE

## 2022-03-05 PROCEDURE — 83735 ASSAY OF MAGNESIUM: CPT

## 2022-03-05 PROCEDURE — 94640 AIRWAY INHALATION TREATMENT: CPT

## 2022-03-05 PROCEDURE — 99152 MOD SED SAME PHYS/QHP 5/>YRS: CPT

## 2022-03-05 PROCEDURE — 74011250637 HC RX REV CODE- 250/637: Performed by: INTERNAL MEDICINE

## 2022-03-05 PROCEDURE — 74011636637 HC RX REV CODE- 636/637: Performed by: PHYSICIAN ASSISTANT

## 2022-03-05 PROCEDURE — 5A09357 ASSISTANCE WITH RESPIRATORY VENTILATION, LESS THAN 24 CONSECUTIVE HOURS, CONTINUOUS POSITIVE AIRWAY PRESSURE: ICD-10-PCS | Performed by: INTERNAL MEDICINE

## 2022-03-05 RX ORDER — HEPARIN SODIUM 200 [USP'U]/100ML
INJECTION, SOLUTION INTRAVENOUS
Status: COMPLETED | OUTPATIENT
Start: 2022-03-05 | End: 2022-03-05

## 2022-03-05 RX ORDER — LIDOCAINE HYDROCHLORIDE 20 MG/ML
15 SOLUTION OROPHARYNGEAL AS NEEDED
Status: DISCONTINUED | OUTPATIENT
Start: 2022-03-05 | End: 2022-03-05 | Stop reason: HOSPADM

## 2022-03-05 RX ORDER — FENTANYL CITRATE 50 UG/ML
25-50 INJECTION, SOLUTION INTRAMUSCULAR; INTRAVENOUS
Status: DISCONTINUED | OUTPATIENT
Start: 2022-03-05 | End: 2022-03-05 | Stop reason: HOSPADM

## 2022-03-05 RX ORDER — HEPARIN SODIUM 1000 [USP'U]/ML
20 INJECTION, SOLUTION INTRAVENOUS; SUBCUTANEOUS ONCE
Status: COMPLETED | OUTPATIENT
Start: 2022-03-05 | End: 2022-03-05

## 2022-03-05 RX ORDER — LIDOCAINE HYDROCHLORIDE 10 MG/ML
INJECTION INFILTRATION; PERINEURAL AS NEEDED
Status: DISCONTINUED | OUTPATIENT
Start: 2022-03-05 | End: 2022-03-05 | Stop reason: HOSPADM

## 2022-03-05 RX ORDER — AMIODARONE HYDROCHLORIDE 200 MG/1
400 TABLET ORAL 2 TIMES DAILY
Status: DISCONTINUED | OUTPATIENT
Start: 2022-03-05 | End: 2022-03-10

## 2022-03-05 RX ORDER — MIDAZOLAM HYDROCHLORIDE 1 MG/ML
.5-2 INJECTION, SOLUTION INTRAMUSCULAR; INTRAVENOUS
Status: DISCONTINUED | OUTPATIENT
Start: 2022-03-05 | End: 2022-03-05 | Stop reason: HOSPADM

## 2022-03-05 RX ADMIN — MIDAZOLAM HYDROCHLORIDE 1 MG: 1 INJECTION, SOLUTION INTRAMUSCULAR; INTRAVENOUS at 08:53

## 2022-03-05 RX ADMIN — CLOPIDOGREL BISULFATE 75 MG: 75 TABLET ORAL at 12:09

## 2022-03-05 RX ADMIN — HEPARIN SODIUM 18 UNITS/KG/HR: 5000 INJECTION, SOLUTION INTRAVENOUS at 07:20

## 2022-03-05 RX ADMIN — BUDESONIDE AND FORMOTEROL FUMARATE DIHYDRATE 2 PUFF: 80; 4.5 AEROSOL RESPIRATORY (INHALATION) at 07:02

## 2022-03-05 RX ADMIN — MIDAZOLAM HYDROCHLORIDE 2 MG: 1 INJECTION, SOLUTION INTRAMUSCULAR; INTRAVENOUS at 08:44

## 2022-03-05 RX ADMIN — BUDESONIDE AND FORMOTEROL FUMARATE DIHYDRATE 2 PUFF: 80; 4.5 AEROSOL RESPIRATORY (INHALATION) at 21:43

## 2022-03-05 RX ADMIN — FENTANYL CITRATE 50 MCG: 50 INJECTION INTRAMUSCULAR; INTRAVENOUS at 08:45

## 2022-03-05 RX ADMIN — ALOGLIPTIN 12.5 MG: 12.5 TABLET, FILM COATED ORAL at 12:09

## 2022-03-05 RX ADMIN — CARVEDILOL 6.25 MG: 6.25 TABLET, FILM COATED ORAL at 17:00

## 2022-03-05 RX ADMIN — MONTELUKAST 10 MG: 10 TABLET, FILM COATED ORAL at 21:28

## 2022-03-05 RX ADMIN — GLIPIZIDE 10 MG: 5 TABLET ORAL at 17:00

## 2022-03-05 RX ADMIN — ASPIRIN 81 MG: 81 TABLET, CHEWABLE ORAL at 08:13

## 2022-03-05 RX ADMIN — AMIODARONE HYDROCHLORIDE 1 MG/MIN: 50 INJECTION, SOLUTION INTRAVENOUS at 00:29

## 2022-03-05 RX ADMIN — AMIODARONE HYDROCHLORIDE 400 MG: 200 TABLET ORAL at 12:09

## 2022-03-05 RX ADMIN — SODIUM CHLORIDE, PRESERVATIVE FREE 10 ML: 5 INJECTION INTRAVENOUS at 21:35

## 2022-03-05 RX ADMIN — ASPIRIN 81 MG: 81 TABLET, CHEWABLE ORAL at 21:28

## 2022-03-05 RX ADMIN — HEPARIN SODIUM 18 UNITS/KG/HR: 5000 INJECTION, SOLUTION INTRAVENOUS at 18:41

## 2022-03-05 RX ADMIN — HEPARIN SODIUM 2020 UNITS: 1000 INJECTION INTRAVENOUS; SUBCUTANEOUS at 07:18

## 2022-03-05 RX ADMIN — GLIPIZIDE 10 MG: 5 TABLET ORAL at 12:09

## 2022-03-05 RX ADMIN — SODIUM CHLORIDE, PRESERVATIVE FREE 10 ML: 5 INJECTION INTRAVENOUS at 05:07

## 2022-03-05 RX ADMIN — CILOSTAZOL 50 MG: 50 TABLET ORAL at 17:00

## 2022-03-05 RX ADMIN — INSULIN LISPRO 2 UNITS: 100 INJECTION, SOLUTION INTRAVENOUS; SUBCUTANEOUS at 17:00

## 2022-03-05 RX ADMIN — LIDOCAINE HYDROCHLORIDE 15 ML: 20 SOLUTION TOPICAL at 08:41

## 2022-03-05 RX ADMIN — CARVEDILOL 6.25 MG: 6.25 TABLET, FILM COATED ORAL at 07:22

## 2022-03-05 RX ADMIN — CILOSTAZOL 50 MG: 50 TABLET ORAL at 12:09

## 2022-03-05 RX ADMIN — MAGNESIUM OXIDE TAB 400 MG (241.3 MG ELEMENTAL MG) 400 MG: 400 (241.3 MG) TAB at 12:09

## 2022-03-05 RX ADMIN — AMIODARONE HYDROCHLORIDE 400 MG: 200 TABLET ORAL at 17:00

## 2022-03-05 RX ADMIN — SODIUM CHLORIDE, PRESERVATIVE FREE 10 ML: 5 INJECTION INTRAVENOUS at 17:01

## 2022-03-05 RX ADMIN — FLUTICASONE PROPIONATE 2 SPRAY: 50 SPRAY, METERED NASAL at 12:18

## 2022-03-05 RX ADMIN — VALSARTAN 320 MG: 320 TABLET, FILM COATED ORAL at 12:08

## 2022-03-05 RX ADMIN — ROSUVASTATIN CALCIUM 10 MG: 10 TABLET, FILM COATED ORAL at 12:09

## 2022-03-05 NOTE — ROUTINE PROCESS
TRANSFER - IN REPORT:    Verbal report received from April (name) on Shelly Eason  being received from ED(unit) for routine post - op      Report consisted of patients Situation, Background, Assessment and   Recommendations(SBAR). Information from the following report(s) SBAR was reviewed with the receiving nurse. Opportunity for questions and clarification was provided. Assessment completed upon patients arrival to unit and care assumed. Primary Nurse Hood Herrera and Cam Cao RN performed a dual skin assessment on this patient. Fragile skin with some tearing on chest where chest pads had been placed. Otherwise intact.

## 2022-03-05 NOTE — PROGRESS NOTES
MADDIE/Cardioversion with Dr Prado Records  Versed 3mg  Fentanyl 50mcg  Shocked once at 360J  Pt tolerated well

## 2022-03-05 NOTE — PROGRESS NOTES
TRANSFER - OUT REPORT:    Verbal report given to GALLITO Lamar(name) on Zohra Matters  being transferred to 2224(unit) for routine progression of care       Report consisted of patients Situation, Background, Assessment and   Recommendations(SBAR). Information from the following report(s) SBAR was reviewed with the receiving nurse.     [facility] with Dr Juan Echevarria  No interventions  R Radial  TR band at 12mL  Versed 7mg total  Fentanyl 75mcg total  Heparin 5000 units    MADDIE/cardioversion with Dr Alvarez Shown once at 360J    Restart Heparin drip 2 hours after TR band is removed  Do NOT restart Amiodarone

## 2022-03-05 NOTE — PROGRESS NOTES
Problem: Falls - Risk of  Goal: *Absence of Falls  Description: Document Cele Litter Fall Risk and appropriate interventions in the flowsheet. Outcome: Progressing Towards Goal  Note: Fall Risk Interventions:  Mobility Interventions: Bed/chair exit alarm,Patient to call before getting OOB    Medication Interventions: Bed/chair exit alarm,Patient to call before getting OOB,Teach patient to arise slowly    Elimination Interventions: Bed/chair exit alarm,Call light in reach,Patient to call for help with toileting needs,Urinal in reach         Problem: Pressure Injury - Risk of  Goal: *Prevention of pressure injury  Description: Document Hemant Scale and appropriate interventions in the flowsheet. Outcome: Progressing Towards Goal  Note: Pressure Injury Interventions:     Activity Interventions: Pressure redistribution bed/mattress(bed type)    Mobility Interventions: Pressure redistribution bed/mattress (bed type)    Nutrition Interventions: Document food/fluid/supplement intake,Offer support with meals,snacks and hydration

## 2022-03-05 NOTE — ROUTINE PROCESS
Bedside shift report received from [facility], [address] [place] [address] included SBAR.

## 2022-03-05 NOTE — PROGRESS NOTES
[facility] CARDIOLOGY PROGRESS NOTE           3/5/2022 7:41 AM    Admit Date: 3/4/2022      Subjective:   Patient resting comfortably overnight. Denies any chest pain. No recurrent ventricular tachycardia. Patient remains in rate controlled atrial fibrillation. ROS:  Cardiovascular:  As noted above    Objective:      Vitals:    03/05/22 0330 03/05/22 0430 03/05/22 0700 03/05/22 0702   BP: 108/68 109/61 108/69    Pulse: 78 82 88    Resp: 20 22    Temp: 97.5 °F (36.4 °C)  97.7 °F (36.5 °C)    SpO2: 95%  95% 95%   Weight:       Height:           Physical Exam:  General-No Acute Distress  Neck- supple, no JVD  CV- irregular rate and rhythm no MRG  Lung- clear bilaterally  Abd- soft, nontender, nondistended  Ext- no edema bilaterally. Skin- warm and dry    Data Review:   Recent Labs     03/05/22  0614 03/04/22  1557    139   K 4.1 4.5   MG 2.2 2.1   BUN 21 23   CREA 1.20 1.60*   * 194*   WBC  --  13.4*   HGB  --  12.1*   HCT  --  36.1*   PLT  --  164   INR  --  1.2   CHOL 77  --    LDLC 16.4  --    HDL 45  --        Assessment/Plan:     Principal Problem:    Ventricular tachycardia ([facility] (3/4/2022)  Patient with sustained ventricular tachycardia treated with amiodarone IV lidocaine. Patient converted to atrial fibrillation. We will continue IV amiodarone. Patient will proceed with cardiac catheterization today. Risk benefits of procedure were discussed with patient is willing to proceed. Active Problems:    Coronary artery disease involving native coronary artery of native heart without angina pectoris (10/12/2011)  Patient with established coronary artery disease and prior PCI and stent to the OM 4 years ago. Given presentation with sustained ventricular tachycardia we will proceed with cardiac catheterization. HTN (hypertension) (10/12/2011)  Hemodynamics are stable      Severe obesity (BMI 35.0-39. 9) with comorbidity ([address] (10/12/2011)  Patient with severe morbid obesity which remains unchanged. DM (diabetes mellitus) type II, controlled, with peripheral vascular disorder ([facility] (9/11/2014)    Hemoglobin A1c 09/2021 5.9%. COPD (chronic obstructive pulmonary disease) (HCC) (4/10/2015)  Stable      Atrial fibrillation ([facility] (3/4/2022)  Newly diagnosed atrial fibrillation. Patient is on IV heparin. We will plan MADDIE guided cardioversion prior to cardiac catheterization today. VT (ventricular tachycardia) ([facility] (3/4/2022)  See above.           Maksim Villalobos MD  3/5/2022 7:41 AM

## 2022-03-05 NOTE — PROGRESS NOTES
TRANSFER - IN REPORT:    Verbal report received from RN(name) on [facility]  being received from 2224(unit) for ordered procedure      Report consisted of patients Situation, Background, Assessment and   Recommendations(SBAR). Information from the following report(s) SBAR was reviewed with the receiving nurse. Opportunity for questions and clarification was provided. Assessment completed upon patients arrival to unit and care assumed.

## 2022-03-05 NOTE — ROUTINE PROCESS
TRANSFER - IN REPORT:    Verbal report received from [facility] (name) on Ella Burton  being received from Cath Lab (unit) for routine post - op      Report consisted of patients Situation, Background, Assessment and   Recommendations(SBAR). Information from the following report(s) SBAR, OR Summary and Procedure Summary was reviewed with the receiving nurse. Opportunity for questions and clarification was provided. Assessment completed upon patients arrival to unit and care assumed.

## 2022-03-06 LAB
ANION GAP SERPL CALC-SCNC: 7 MMOL/L (ref 7–16)
ATRIAL RATE: 74 BPM
BUN SERPL-MCNC: 16 MG/DL (ref 8–23)
CALCIUM SERPL-MCNC: 9 MG/DL (ref 8.3–10.4)
CALCULATED P AXIS, ECG09: 79 DEGREES
CALCULATED R AXIS, ECG10: 90 DEGREES
CALCULATED T AXIS, ECG11: 6 DEGREES
CHLORIDE SERPL-SCNC: 109 MMOL/L (ref 98–107)
CO2 SERPL-SCNC: 25 MMOL/L (ref 21–32)
CREAT SERPL-MCNC: 1.1 MG/DL (ref 0.8–1.5)
DIAGNOSIS, 93000: NORMAL
GLUCOSE BLD STRIP.AUTO-MCNC: 110 MG/DL (ref 65–100)
GLUCOSE BLD STRIP.AUTO-MCNC: 120 MG/DL (ref 65–100)
GLUCOSE BLD STRIP.AUTO-MCNC: 129 MG/DL (ref 65–100)
GLUCOSE BLD STRIP.AUTO-MCNC: 131 MG/DL (ref 65–100)
GLUCOSE SERPL-MCNC: 116 MG/DL (ref 65–100)
MAGNESIUM SERPL-MCNC: 2.3 MG/DL (ref 1.8–2.4)
P-R INTERVAL, ECG05: 256 MS
POTASSIUM SERPL-SCNC: 4.2 MMOL/L (ref 3.5–5.1)
Q-T INTERVAL, ECG07: 396 MS
QRS DURATION, ECG06: 96 MS
QTC CALCULATION (BEZET), ECG08: 439 MS
SERVICE CMNT-IMP: ABNORMAL
SODIUM SERPL-SCNC: 141 MMOL/L (ref 136–145)
UFH PPP CHRO-ACNC: 0.17 IU/ML (ref 0.3–0.7)
UFH PPP CHRO-ACNC: 0.18 IU/ML (ref 0.3–0.7)
UFH PPP CHRO-ACNC: 0.43 IU/ML (ref 0.3–0.7)
UFH PPP CHRO-ACNC: 0.61 IU/ML (ref 0.3–0.7)
VENTRICULAR RATE, ECG03: 74 BPM

## 2022-03-06 PROCEDURE — 36415 COLL VENOUS BLD VENIPUNCTURE: CPT

## 2022-03-06 PROCEDURE — 65660000000 HC RM CCU STEPDOWN

## 2022-03-06 PROCEDURE — 74011250637 HC RX REV CODE- 250/637: Performed by: PHYSICIAN ASSISTANT

## 2022-03-06 PROCEDURE — 93005 ELECTROCARDIOGRAM TRACING: CPT | Performed by: INTERNAL MEDICINE

## 2022-03-06 PROCEDURE — 85520 HEPARIN ASSAY: CPT

## 2022-03-06 PROCEDURE — 74011250636 HC RX REV CODE- 250/636: Performed by: PHYSICIAN ASSISTANT

## 2022-03-06 PROCEDURE — 2709999900 HC NON-CHARGEABLE SUPPLY

## 2022-03-06 PROCEDURE — 99232 SBSQ HOSP IP/OBS MODERATE 35: CPT | Performed by: INTERNAL MEDICINE

## 2022-03-06 PROCEDURE — 82962 GLUCOSE BLOOD TEST: CPT

## 2022-03-06 PROCEDURE — 94640 AIRWAY INHALATION TREATMENT: CPT

## 2022-03-06 PROCEDURE — 74011000250 HC RX REV CODE- 250: Performed by: PHYSICIAN ASSISTANT

## 2022-03-06 PROCEDURE — 80048 BASIC METABOLIC PNL TOTAL CA: CPT

## 2022-03-06 PROCEDURE — 74011250637 HC RX REV CODE- 250/637: Performed by: INTERNAL MEDICINE

## 2022-03-06 PROCEDURE — 74011250636 HC RX REV CODE- 250/636: Performed by: INTERNAL MEDICINE

## 2022-03-06 PROCEDURE — 83735 ASSAY OF MAGNESIUM: CPT

## 2022-03-06 PROCEDURE — 94760 N-INVAS EAR/PLS OXIMETRY 1: CPT

## 2022-03-06 RX ORDER — CARVEDILOL 12.5 MG/1
12.5 TABLET ORAL 2 TIMES DAILY WITH MEALS
Status: DISCONTINUED | OUTPATIENT
Start: 2022-03-06 | End: 2022-03-10

## 2022-03-06 RX ORDER — HEPARIN SODIUM 1000 [USP'U]/ML
20 INJECTION, SOLUTION INTRAVENOUS; SUBCUTANEOUS
Status: COMPLETED | OUTPATIENT
Start: 2022-03-06 | End: 2022-03-06

## 2022-03-06 RX ORDER — HEPARIN SODIUM 1000 [USP'U]/ML
20 INJECTION, SOLUTION INTRAVENOUS; SUBCUTANEOUS ONCE
Status: COMPLETED | OUTPATIENT
Start: 2022-03-06 | End: 2022-03-06

## 2022-03-06 RX ADMIN — SODIUM CHLORIDE, PRESERVATIVE FREE 10 ML: 5 INJECTION INTRAVENOUS at 06:22

## 2022-03-06 RX ADMIN — FLUTICASONE PROPIONATE 2 SPRAY: 50 SPRAY, METERED NASAL at 09:11

## 2022-03-06 RX ADMIN — BUDESONIDE AND FORMOTEROL FUMARATE DIHYDRATE 2 PUFF: 80; 4.5 AEROSOL RESPIRATORY (INHALATION) at 08:00

## 2022-03-06 RX ADMIN — CILOSTAZOL 50 MG: 50 TABLET ORAL at 17:06

## 2022-03-06 RX ADMIN — HEPARIN SODIUM 22 UNITS/KG/HR: 5000 INJECTION, SOLUTION INTRAVENOUS at 22:22

## 2022-03-06 RX ADMIN — MONTELUKAST 10 MG: 10 TABLET, FILM COATED ORAL at 20:45

## 2022-03-06 RX ADMIN — MAGNESIUM OXIDE TAB 400 MG (241.3 MG ELEMENTAL MG) 400 MG: 400 (241.3 MG) TAB at 09:00

## 2022-03-06 RX ADMIN — AMIODARONE HYDROCHLORIDE 400 MG: 200 TABLET ORAL at 09:00

## 2022-03-06 RX ADMIN — CLOPIDOGREL BISULFATE 75 MG: 75 TABLET ORAL at 09:22

## 2022-03-06 RX ADMIN — HEPARIN SODIUM 20 UNITS/KG/HR: 5000 INJECTION, SOLUTION INTRAVENOUS at 09:00

## 2022-03-06 RX ADMIN — AMIODARONE HYDROCHLORIDE 400 MG: 200 TABLET ORAL at 17:06

## 2022-03-06 RX ADMIN — BUDESONIDE AND FORMOTEROL FUMARATE DIHYDRATE 2 PUFF: 80; 4.5 AEROSOL RESPIRATORY (INHALATION) at 20:02

## 2022-03-06 RX ADMIN — GLIPIZIDE 10 MG: 5 TABLET ORAL at 09:00

## 2022-03-06 RX ADMIN — HEPARIN SODIUM 2020 UNITS: 1000 INJECTION INTRAVENOUS; SUBCUTANEOUS at 17:27

## 2022-03-06 RX ADMIN — GLIPIZIDE 10 MG: 5 TABLET ORAL at 17:06

## 2022-03-06 RX ADMIN — CILOSTAZOL 50 MG: 50 TABLET ORAL at 09:00

## 2022-03-06 RX ADMIN — HEPARIN SODIUM 20 UNITS/KG/HR: 5000 INJECTION, SOLUTION INTRAVENOUS at 02:08

## 2022-03-06 RX ADMIN — VALSARTAN 320 MG: 320 TABLET, FILM COATED ORAL at 10:33

## 2022-03-06 RX ADMIN — ALOGLIPTIN 12.5 MG: 12.5 TABLET, FILM COATED ORAL at 09:00

## 2022-03-06 RX ADMIN — CARVEDILOL 12.5 MG: 12.5 TABLET, FILM COATED ORAL at 17:06

## 2022-03-06 RX ADMIN — SODIUM CHLORIDE, PRESERVATIVE FREE 10 ML: 5 INJECTION INTRAVENOUS at 17:07

## 2022-03-06 RX ADMIN — ASPIRIN 81 MG: 81 TABLET, CHEWABLE ORAL at 20:45

## 2022-03-06 RX ADMIN — SODIUM CHLORIDE, PRESERVATIVE FREE 10 ML: 5 INJECTION INTRAVENOUS at 20:46

## 2022-03-06 RX ADMIN — ROSUVASTATIN CALCIUM 10 MG: 10 TABLET, FILM COATED ORAL at 09:00

## 2022-03-06 RX ADMIN — HEPARIN SODIUM 2020 UNITS: 1000 INJECTION INTRAVENOUS; SUBCUTANEOUS at 02:07

## 2022-03-06 NOTE — PROGRESS NOTES
[facility] CARDIOLOGY PROGRESS NOTE           3/6/2022 7:41 AM    Admit Date: 3/4/2022      Subjective:   Patient resting comfortably overnight. Denies any chest pain. Patient remains in sinus rhythm after MADDIE guided cardioversion. He had a short run of nonsustained VT last evening. He was asymptomatic. ROS:  Cardiovascular:  As noted above    Objective:      Vitals:    03/05/22 2320 03/06/22 0436 03/06/22 0728 03/06/22 0802   BP: 104/60 112/60 133/66    Pulse: 71 68 82    Resp: 20 20 20    Temp: 97.5 °F (36.4 °C) 97.6 °F (36.4 °C) 98.5 °F (36.9 °C)    SpO2: 94% 94% 92% 95%   Weight:       Height:           Physical Exam:  General-No Acute Distress  Neck- supple, no JVD  CV- regular rate and rhythm no MRG  Lung- clear bilaterally  Abd- soft, nontender, nondistended  Ext- no edema bilaterally. Skin- warm and dry    Data Review:   Recent Labs     03/06/22  0428 03/05/22  0614 03/04/22  1557 03/04/22  1557    140   < > 139   K 4.2 4.1   < > 4.5   MG 2.3 2.2   < > 2.1   BUN 16 21   < > 23   CREA 1.10 1.20   < > 1.60*   * 115*   < > 194*   WBC  --   --   --  13.4*   HGB  --   --   --  12.1*   HCT  --   --   --  36.1*   PLT  --   --   --  164   INR  --   --   --  1.2   CHOL  --  77  --   --    LDLC  --  16.4  --   --    HDL  --  45  --   --     < > = values in this interval not displayed. Interpretation Summary         Left Ventricle: Left ventricle is moderately dilated. Mildly increased wall thickness. Akinesis of the following segments: basal inferior, basal inferolateral, mid inferior, mid inferolateral, apical lateral and apical inferior. Severely reduced left ventricular systolic function with a visually estimated EF of 30 - 35%.   Mitral Valve: Mild annular calcification of the mitral valve. Mild transvalvular regurgitation.   Left Atrium: Left atrium is moderately dilated. No left atrial appendage thrombus noted. No left atrial appendage mass noted.    Right Atrium: Right atrium is moderately dilated.   Successful cardioversion with restoration of sinus rhythm with PACs and PVCs       Assessment/Plan:     Principal Problem:    Ventricular tachycardia ([address] (3/4/2022)  Patient with sustained ventricular tachycardia treated with amiodarone IV lidocaine. Patient converted to atrial fibrillation. He is now status post MADDIE guided cardioversion remains in sinus rhythm with first-degree AV block. He had recurrent short run of nonsustained VT last evening. He is on oral amiodarone 40 mg twice daily. Electrophysiology has been consulted to consider ICD implantation. Will make patient n.p.o. after midnight. Active Problems:    Coronary artery disease involving native coronary artery of native heart without angina pectoris (10/12/2011)  Patient status post cardiac catheterization yesterday with stable CAD chronically occluded right coronary artery. LVEF is severely reduced. Heart failure with reduced ejection fraction  We will increase carvedilol to 12.5 mg twice daily and valsartan 320 mg daily. Patient is stable from a volume standpoint. Patient will be evaluated for possible ICD. HTN (hypertension) (10/12/2011)  Hemodynamics are stable      Severe obesity (BMI 35.0-39. 9) with comorbidity ([address] (10/12/2011)  Patient with severe morbid obesity which remains unchanged. DM (diabetes mellitus) type II, controlled, with peripheral vascular disorder ([address] (9/11/2014)    Hemoglobin A1c 09/2021 5.9%. COPD (chronic obstructive pulmonary disease) ([facility]) (4/10/2015)  Stable      Atrial fibrillation ([address] (3/4/2022)  Newly diagnosed atrial fibrillation. Patient is on IV heparin. We will plan MADDIE guided cardioversion prior to cardiac catheterization today. VT (ventricular tachycardia) ([address] (3/4/2022)  See above.           Maureen Cook MD  3/6/2022 7:41 AM

## 2022-03-06 NOTE — PROGRESS NOTES
Bedside and Verbal shift change report given to Self (oncoming nurse) by Mayela Bray RN (offgoing nurse). Report included the following information SBAR, Kardex and MAR.

## 2022-03-06 NOTE — PROGRESS NOTES
Bedside and Verbal shift change report given to Miguel Bermudez RN (oncoming nurse) by Self (offgoing nurse). Report included the following information SBAR, Kardex and MAR.

## 2022-03-07 ENCOUNTER — APPOINTMENT (OUTPATIENT)
Dept: GENERAL RADIOLOGY | Age: 77
DRG: 224 | End: 2022-03-07
Attending: INTERNAL MEDICINE
Payer: MEDICARE

## 2022-03-07 ENCOUNTER — ANESTHESIA (OUTPATIENT)
Dept: CARDIAC CATH/INVASIVE PROCEDURES | Age: 77
DRG: 224 | End: 2022-03-07
Payer: MEDICARE

## 2022-03-07 ENCOUNTER — ANESTHESIA EVENT (OUTPATIENT)
Dept: CARDIAC CATH/INVASIVE PROCEDURES | Age: 77
DRG: 224 | End: 2022-03-07
Payer: MEDICARE

## 2022-03-07 LAB
ANION GAP SERPL CALC-SCNC: 5 MMOL/L (ref 7–16)
ANION GAP SERPL CALC-SCNC: 6 MMOL/L (ref 7–16)
APPEARANCE UR: CLEAR
B PERT DNA SPEC QL NAA+PROBE: NOT DETECTED
BACTERIA URNS QL MICRO: 0 /HPF
BILIRUB UR QL: NEGATIVE
BORDETELLA PARAPERTUSSIS PCR, BORPAR: NOT DETECTED
BUN SERPL-MCNC: 15 MG/DL (ref 8–23)
BUN SERPL-MCNC: 18 MG/DL (ref 8–23)
C PNEUM DNA SPEC QL NAA+PROBE: NOT DETECTED
CALCIUM SERPL-MCNC: 8.6 MG/DL (ref 8.3–10.4)
CALCIUM SERPL-MCNC: 8.8 MG/DL (ref 8.3–10.4)
CASTS URNS QL MICRO: ABNORMAL /LPF
CHLORIDE SERPL-SCNC: 107 MMOL/L (ref 98–107)
CHLORIDE SERPL-SCNC: 109 MMOL/L (ref 98–107)
CO2 SERPL-SCNC: 27 MMOL/L (ref 21–32)
CO2 SERPL-SCNC: 27 MMOL/L (ref 21–32)
COLOR UR: YELLOW
CREAT SERPL-MCNC: 1.2 MG/DL (ref 0.8–1.5)
CREAT SERPL-MCNC: 1.5 MG/DL (ref 0.8–1.5)
EPI CELLS #/AREA URNS HPF: 0 /HPF
ERYTHROCYTE [DISTWIDTH] IN BLOOD BY AUTOMATED COUNT: 15.1 % (ref 11.9–14.6)
FLUAV SUBTYP SPEC NAA+PROBE: NOT DETECTED
FLUBV RNA SPEC QL NAA+PROBE: NOT DETECTED
GLUCOSE BLD STRIP.AUTO-MCNC: 121 MG/DL (ref 65–100)
GLUCOSE BLD STRIP.AUTO-MCNC: 150 MG/DL (ref 65–100)
GLUCOSE BLD STRIP.AUTO-MCNC: 150 MG/DL (ref 65–100)
GLUCOSE BLD STRIP.AUTO-MCNC: 210 MG/DL (ref 65–100)
GLUCOSE SERPL-MCNC: 126 MG/DL (ref 65–100)
GLUCOSE SERPL-MCNC: 173 MG/DL (ref 65–100)
GLUCOSE UR STRIP.AUTO-MCNC: 250 MG/DL
HADV DNA SPEC QL NAA+PROBE: NOT DETECTED
HCOV 229E RNA SPEC QL NAA+PROBE: NOT DETECTED
HCOV HKU1 RNA SPEC QL NAA+PROBE: NOT DETECTED
HCOV NL63 RNA SPEC QL NAA+PROBE: NOT DETECTED
HCOV OC43 RNA SPEC QL NAA+PROBE: NOT DETECTED
HCT VFR BLD AUTO: 35.1 % (ref 41.1–50.3)
HGB BLD-MCNC: 11.7 G/DL (ref 13.6–17.2)
HGB UR QL STRIP: NEGATIVE
HMPV RNA SPEC QL NAA+PROBE: NOT DETECTED
HPIV1 RNA SPEC QL NAA+PROBE: NOT DETECTED
HPIV2 RNA SPEC QL NAA+PROBE: NOT DETECTED
HPIV3 RNA SPEC QL NAA+PROBE: NOT DETECTED
HPIV4 RNA SPEC QL NAA+PROBE: NOT DETECTED
KETONES UR QL STRIP.AUTO: NEGATIVE MG/DL
LEUKOCYTE ESTERASE UR QL STRIP.AUTO: NEGATIVE
M PNEUMO DNA SPEC QL NAA+PROBE: NOT DETECTED
MAGNESIUM SERPL-MCNC: 2 MG/DL (ref 1.8–2.4)
MAGNESIUM SERPL-MCNC: 2.4 MG/DL (ref 1.8–2.4)
MCH RBC QN AUTO: 32.5 PG (ref 26.1–32.9)
MCHC RBC AUTO-ENTMCNC: 33.3 G/DL (ref 31.4–35)
MCV RBC AUTO: 97.5 FL (ref 79.6–97.8)
NITRITE UR QL STRIP.AUTO: NEGATIVE
NRBC # BLD: 0 K/UL (ref 0–0.2)
PH UR STRIP: 5.5 [PH] (ref 5–9)
PLATELET # BLD AUTO: 150 K/UL (ref 150–450)
PMV BLD AUTO: 10.8 FL (ref 9.4–12.3)
POTASSIUM SERPL-SCNC: 4 MMOL/L (ref 3.5–5.1)
POTASSIUM SERPL-SCNC: 4.3 MMOL/L (ref 3.5–5.1)
PROT UR STRIP-MCNC: 30 MG/DL
RBC # BLD AUTO: 3.6 M/UL (ref 4.23–5.6)
RBC #/AREA URNS HPF: ABNORMAL /HPF
RSV RNA SPEC QL NAA+PROBE: NOT DETECTED
RV+EV RNA SPEC QL NAA+PROBE: NOT DETECTED
SARS-COV-2 PCR, COVPCR: NOT DETECTED
SERVICE CMNT-IMP: ABNORMAL
SODIUM SERPL-SCNC: 140 MMOL/L (ref 136–145)
SODIUM SERPL-SCNC: 141 MMOL/L (ref 136–145)
SP GR UR REFRACTOMETRY: 1.02 (ref 1–1.02)
UFH PPP CHRO-ACNC: 0.58 IU/ML (ref 0.3–0.7)
UROBILINOGEN UR QL STRIP.AUTO: 0.2 EU/DL (ref 0.2–1)
WBC # BLD AUTO: 11.8 K/UL (ref 4.3–11.1)
WBC URNS QL MICRO: ABNORMAL /HPF

## 2022-03-07 PROCEDURE — 74011250637 HC RX REV CODE- 250/637: Performed by: INTERNAL MEDICINE

## 2022-03-07 PROCEDURE — 74011250637 HC RX REV CODE- 250/637: Performed by: PHYSICIAN ASSISTANT

## 2022-03-07 PROCEDURE — 87086 URINE CULTURE/COLONY COUNT: CPT

## 2022-03-07 PROCEDURE — 74011000250 HC RX REV CODE- 250: Performed by: ANESTHESIOLOGY

## 2022-03-07 PROCEDURE — C1777 LEAD, AICD, ENDO SINGLE COIL: HCPCS | Performed by: INTERNAL MEDICINE

## 2022-03-07 PROCEDURE — 83735 ASSAY OF MAGNESIUM: CPT

## 2022-03-07 PROCEDURE — C1721 AICD, DUAL CHAMBER: HCPCS | Performed by: INTERNAL MEDICINE

## 2022-03-07 PROCEDURE — 85027 COMPLETE CBC AUTOMATED: CPT

## 2022-03-07 PROCEDURE — 74011000250 HC RX REV CODE- 250: Performed by: INTERNAL MEDICINE

## 2022-03-07 PROCEDURE — 2709999900 HC NON-CHARGEABLE SUPPLY

## 2022-03-07 PROCEDURE — C1894 INTRO/SHEATH, NON-LASER: HCPCS | Performed by: INTERNAL MEDICINE

## 2022-03-07 PROCEDURE — 80048 BASIC METABOLIC PNL TOTAL CA: CPT

## 2022-03-07 PROCEDURE — 0JH608Z INSERTION OF DEFIBRILLATOR GENERATOR INTO CHEST SUBCUTANEOUS TISSUE AND FASCIA, OPEN APPROACH: ICD-10-PCS | Performed by: INTERNAL MEDICINE

## 2022-03-07 PROCEDURE — 33249 INSJ/RPLCMT DEFIB W/LEAD(S): CPT | Performed by: INTERNAL MEDICINE

## 2022-03-07 PROCEDURE — 36415 COLL VENOUS BLD VENIPUNCTURE: CPT

## 2022-03-07 PROCEDURE — 74011250636 HC RX REV CODE- 250/636: Performed by: INTERNAL MEDICINE

## 2022-03-07 PROCEDURE — 76060000034 HC ANESTHESIA 1.5 TO 2 HR: Performed by: INTERNAL MEDICINE

## 2022-03-07 PROCEDURE — C1898 LEAD, PMKR, OTHER THAN TRANS: HCPCS | Performed by: INTERNAL MEDICINE

## 2022-03-07 PROCEDURE — 77030022704 HC SUT VLOC COVD -B: Performed by: INTERNAL MEDICINE

## 2022-03-07 PROCEDURE — 85520 HEPARIN ASSAY: CPT

## 2022-03-07 PROCEDURE — 81001 URINALYSIS AUTO W/SCOPE: CPT

## 2022-03-07 PROCEDURE — 77010033678 HC OXYGEN DAILY

## 2022-03-07 PROCEDURE — C1892 INTRO/SHEATH,FIXED,PEEL-AWAY: HCPCS | Performed by: INTERNAL MEDICINE

## 2022-03-07 PROCEDURE — 99223 1ST HOSP IP/OBS HIGH 75: CPT | Performed by: INTERNAL MEDICINE

## 2022-03-07 PROCEDURE — 77030033067 HC SUT PDO STRATFX SPIR J&J -B: Performed by: INTERNAL MEDICINE

## 2022-03-07 PROCEDURE — 02H63KZ INSERTION OF DEFIBRILLATOR LEAD INTO RIGHT ATRIUM, PERCUTANEOUS APPROACH: ICD-10-PCS | Performed by: INTERNAL MEDICINE

## 2022-03-07 PROCEDURE — 77030010507 HC ADH SKN DERMBND J&J -B: Performed by: INTERNAL MEDICINE

## 2022-03-07 PROCEDURE — 74011000250 HC RX REV CODE- 250: Performed by: NURSE ANESTHETIST, CERTIFIED REGISTERED

## 2022-03-07 PROCEDURE — 77030013687 HC GD NDL BARD -B: Performed by: INTERNAL MEDICINE

## 2022-03-07 PROCEDURE — 71045 X-RAY EXAM CHEST 1 VIEW: CPT

## 2022-03-07 PROCEDURE — 74011636637 HC RX REV CODE- 636/637: Performed by: INTERNAL MEDICINE

## 2022-03-07 PROCEDURE — 0202U NFCT DS 22 TRGT SARS-COV-2: CPT

## 2022-03-07 PROCEDURE — 77030013504 HC DEV ELECSURG MEDT -F: Performed by: INTERNAL MEDICINE

## 2022-03-07 PROCEDURE — 82962 GLUCOSE BLOOD TEST: CPT

## 2022-03-07 PROCEDURE — 77030018547 HC SUT ETHBND1 J&J -B: Performed by: INTERNAL MEDICINE

## 2022-03-07 PROCEDURE — 94760 N-INVAS EAR/PLS OXIMETRY 1: CPT

## 2022-03-07 PROCEDURE — 74011000250 HC RX REV CODE- 250: Performed by: PHYSICIAN ASSISTANT

## 2022-03-07 PROCEDURE — 77030041279 HC DRSG PRMSL AG MDII -B: Performed by: INTERNAL MEDICINE

## 2022-03-07 PROCEDURE — 74011250636 HC RX REV CODE- 250/636: Performed by: NURSE ANESTHETIST, CERTIFIED REGISTERED

## 2022-03-07 PROCEDURE — 74018 RADEX ABDOMEN 1 VIEW: CPT

## 2022-03-07 PROCEDURE — 94640 AIRWAY INHALATION TREATMENT: CPT

## 2022-03-07 PROCEDURE — 74011000636 HC RX REV CODE- 636: Performed by: INTERNAL MEDICINE

## 2022-03-07 PROCEDURE — 65660000000 HC RM CCU STEPDOWN

## 2022-03-07 PROCEDURE — 94664 DEMO&/EVAL PT USE INHALER: CPT

## 2022-03-07 PROCEDURE — 02HK3KZ INSERTION OF DEFIBRILLATOR LEAD INTO RIGHT VENTRICLE, PERCUTANEOUS APPROACH: ICD-10-PCS | Performed by: INTERNAL MEDICINE

## 2022-03-07 DEVICE — IMPLANTABLE CARDIOVERTER DEFIBRILLATOR DR
Type: IMPLANTABLE DEVICE | Site: CHEST | Status: FUNCTIONAL
Brand: VIGILANT™ EL ICD DR

## 2022-03-07 DEVICE — INTEGRATED BIPOLAR PACE/SENSE AND DEFIBRILLATION LEAD
Type: IMPLANTABLE DEVICE | Site: CHEST | Status: FUNCTIONAL
Brand: RELIANCE 4-FRONT™

## 2022-03-07 DEVICE — PACE/SENSE LEAD
Type: IMPLANTABLE DEVICE | Site: CHEST | Status: FUNCTIONAL
Brand: INGEVITY™+

## 2022-03-07 RX ORDER — SODIUM CHLORIDE 9 MG/ML
250 INJECTION, SOLUTION INTRAVENOUS CONTINUOUS
Status: DISPENSED | OUTPATIENT
Start: 2022-03-07 | End: 2022-03-07

## 2022-03-07 RX ORDER — ALBUTEROL SULFATE 0.83 MG/ML
2.5 SOLUTION RESPIRATORY (INHALATION)
Status: COMPLETED | OUTPATIENT
Start: 2022-03-07 | End: 2022-03-07

## 2022-03-07 RX ORDER — LIDOCAINE HYDROCHLORIDE 20 MG/ML
INJECTION, SOLUTION EPIDURAL; INFILTRATION; INTRACAUDAL; PERINEURAL AS NEEDED
Status: DISCONTINUED | OUTPATIENT
Start: 2022-03-07 | End: 2022-03-07 | Stop reason: HOSPADM

## 2022-03-07 RX ORDER — PROPOFOL 10 MG/ML
INJECTION, EMULSION INTRAVENOUS
Status: DISCONTINUED | OUTPATIENT
Start: 2022-03-07 | End: 2022-03-07 | Stop reason: HOSPADM

## 2022-03-07 RX ORDER — SODIUM CHLORIDE 0.9 % (FLUSH) 0.9 %
5-40 SYRINGE (ML) INJECTION AS NEEDED
Status: DISCONTINUED | OUTPATIENT
Start: 2022-03-07 | End: 2022-03-10 | Stop reason: HOSPADM

## 2022-03-07 RX ORDER — ONDANSETRON 2 MG/ML
4 INJECTION INTRAMUSCULAR; INTRAVENOUS ONCE
Status: COMPLETED | OUTPATIENT
Start: 2022-03-07 | End: 2022-03-07

## 2022-03-07 RX ORDER — SODIUM CHLORIDE 0.9 % (FLUSH) 0.9 %
5-40 SYRINGE (ML) INJECTION EVERY 8 HOURS
Status: DISCONTINUED | OUTPATIENT
Start: 2022-03-07 | End: 2022-03-10 | Stop reason: HOSPADM

## 2022-03-07 RX ORDER — SODIUM CHLORIDE, SODIUM LACTATE, POTASSIUM CHLORIDE, CALCIUM CHLORIDE 600; 310; 30; 20 MG/100ML; MG/100ML; MG/100ML; MG/100ML
INJECTION, SOLUTION INTRAVENOUS
Status: DISCONTINUED | OUTPATIENT
Start: 2022-03-07 | End: 2022-03-07 | Stop reason: HOSPADM

## 2022-03-07 RX ORDER — PROPOFOL 10 MG/ML
INJECTION, EMULSION INTRAVENOUS AS NEEDED
Status: DISCONTINUED | OUTPATIENT
Start: 2022-03-07 | End: 2022-03-07 | Stop reason: HOSPADM

## 2022-03-07 RX ADMIN — ASPIRIN 81 MG: 81 TABLET, CHEWABLE ORAL at 20:33

## 2022-03-07 RX ADMIN — SODIUM CHLORIDE, PRESERVATIVE FREE 10 ML: 5 INJECTION INTRAVENOUS at 06:11

## 2022-03-07 RX ADMIN — MAGNESIUM OXIDE TAB 400 MG (241.3 MG ELEMENTAL MG) 400 MG: 400 (241.3 MG) TAB at 09:52

## 2022-03-07 RX ADMIN — ACETAMINOPHEN 650 MG: 325 TABLET ORAL at 23:58

## 2022-03-07 RX ADMIN — Medication 3 G: at 14:17

## 2022-03-07 RX ADMIN — FLUTICASONE PROPIONATE 2 SPRAY: 50 SPRAY, METERED NASAL at 09:54

## 2022-03-07 RX ADMIN — ALBUTEROL SULFATE 2.5 MG: 2.5 SOLUTION RESPIRATORY (INHALATION) at 13:03

## 2022-03-07 RX ADMIN — CILOSTAZOL 50 MG: 50 TABLET ORAL at 09:51

## 2022-03-07 RX ADMIN — SODIUM CHLORIDE, SODIUM LACTATE, POTASSIUM CHLORIDE, AND CALCIUM CHLORIDE: 600; 310; 30; 20 INJECTION, SOLUTION INTRAVENOUS at 14:07

## 2022-03-07 RX ADMIN — GLIPIZIDE 10 MG: 5 TABLET ORAL at 09:51

## 2022-03-07 RX ADMIN — ONDANSETRON 4 MG: 2 INJECTION INTRAMUSCULAR; INTRAVENOUS at 20:40

## 2022-03-07 RX ADMIN — CILOSTAZOL 50 MG: 50 TABLET ORAL at 18:29

## 2022-03-07 RX ADMIN — MONTELUKAST 10 MG: 10 TABLET, FILM COATED ORAL at 20:34

## 2022-03-07 RX ADMIN — AMIODARONE HYDROCHLORIDE 400 MG: 200 TABLET ORAL at 18:30

## 2022-03-07 RX ADMIN — ROSUVASTATIN CALCIUM 10 MG: 10 TABLET, FILM COATED ORAL at 09:52

## 2022-03-07 RX ADMIN — AMIODARONE HYDROCHLORIDE 400 MG: 200 TABLET ORAL at 09:51

## 2022-03-07 RX ADMIN — SODIUM CHLORIDE, PRESERVATIVE FREE 10 ML: 5 INJECTION INTRAVENOUS at 18:32

## 2022-03-07 RX ADMIN — BUDESONIDE AND FORMOTEROL FUMARATE DIHYDRATE 2 PUFF: 80; 4.5 AEROSOL RESPIRATORY (INHALATION) at 08:14

## 2022-03-07 RX ADMIN — GLIPIZIDE 10 MG: 5 TABLET ORAL at 18:29

## 2022-03-07 RX ADMIN — PROPOFOL 140 MCG/KG/MIN: 10 INJECTION, EMULSION INTRAVENOUS at 14:14

## 2022-03-07 RX ADMIN — INSULIN LISPRO 4 UNITS: 100 INJECTION, SOLUTION INTRAVENOUS; SUBCUTANEOUS at 20:41

## 2022-03-07 RX ADMIN — ALOGLIPTIN 12.5 MG: 12.5 TABLET, FILM COATED ORAL at 09:51

## 2022-03-07 RX ADMIN — SODIUM CHLORIDE, PRESERVATIVE FREE 10 ML: 5 INJECTION INTRAVENOUS at 20:31

## 2022-03-07 RX ADMIN — CARVEDILOL 12.5 MG: 12.5 TABLET, FILM COATED ORAL at 09:51

## 2022-03-07 RX ADMIN — LIDOCAINE HYDROCHLORIDE 40 MG: 20 INJECTION, SOLUTION EPIDURAL; INFILTRATION; INTRACAUDAL; PERINEURAL at 14:13

## 2022-03-07 RX ADMIN — VALSARTAN 320 MG: 320 TABLET, FILM COATED ORAL at 09:51

## 2022-03-07 RX ADMIN — BUDESONIDE AND FORMOTEROL FUMARATE DIHYDRATE 2 PUFF: 80; 4.5 AEROSOL RESPIRATORY (INHALATION) at 20:19

## 2022-03-07 RX ADMIN — PROPOFOL 30 MG: 10 INJECTION, EMULSION INTRAVENOUS at 14:14

## 2022-03-07 RX ADMIN — CLOPIDOGREL BISULFATE 75 MG: 75 TABLET ORAL at 09:52

## 2022-03-07 RX ADMIN — ACETAMINOPHEN 650 MG: 325 TABLET ORAL at 19:41

## 2022-03-07 NOTE — PROGRESS NOTES
Met with patient in room - ppe + social distance maintained. Demographics confirmed - patient stated he lives at home independently with his wife. (wife at bedside). They live in a multilevel home but patient does not have to navigate the stairs often. Patient denied using any DME and stated he has had home health in the past but not recently. He could not remember which agency he used. He once had prn o2 at home, but stated the agency picked all of the equipment up as he no longer needed it. PCP - Ashley Ortega  RX - [facility]    No anticipated needs at this time. CM will continue to follow.      Care Management Interventions  Mode of Transport at Discharge: Self  Transition of Care Consult (CM Consult): Discharge Planning  Support Systems: Spouse/Significant Other  Confirm Follow Up Transport: Family  The Patient and/or Patient Representative was Provided with a Choice of Provider and Agrees with the Discharge Plan?: Yes  Freedom of Choice List was Provided with Basic Dialogue that Supports the Patient's Individualized Plan of Care/Goals, Treatment Preferences and Shares the Quality Data Associated with the Providers?: Yes  [place] Resource Information Provided?: No  Discharge Location  Patient Expects to be Discharged to[de-identified] Home with family assistance

## 2022-03-07 NOTE — PROCEDURES
: Romina Palma. Sunita Blas MD    REFERRING: Dwight Martin MD    Pre-Procedure Diagnosis  1. Chronic systolic heart failure, ejection fraction of 30-35%  2. Ischemic dilated cardiomyopathy. 3. Class II heart failure symptoms. 4. Chronic systolic heart failure for a minimum of 3 months duration on optimal medical therapy. 5. Secondary prevention indications for defibrillator  6. Life expectancy greater than 1 year. 7. Ventricular tachycardia     Procedure Performed  1. Insertion of an implantable cardioverter defibrillator. Complications: None    Contrast: 15 cc      Fluoroscopy Time: 2.6 minutes     Anesthesia: MAC     Estimated Blood Loss: Less than 10 mL     Specimens: * No specimens in log *     Patient Information and Indications: The procedure, indications, risks, benefits, and alternatives were discussed with the patient and family members, who desired to proceed after questions were answered and informed consent was documented. Procedure: After informed consent was obtained, the patient was brought to the Electrophysiology Laboratory in a fasting state and was prepped and draped in sterile fashion. Prophylactic antibiotic was administered 10 minutes prior to skin incision: refer to anesthesia procedural documentation for full details. Conscious sedation was administered by anesthesia with continuous oxygen saturation measurement and blood pressure measurement. A left upper extremity venogram demonstrated a patient left axillary and subclavian vein without obvious obstruction. Local anesthetic (sensorcaine) was delivered to the left pectoral region. An incision was made parallel to the deltopectoral groove. A subcutaneous pocket was created using blunt dissection and electrocautery, and adequate hemostasis was established.  Access x 2 was obtained under fluoroscopic/ultrasound guidance using a modified Seldinger technique with placement of 2 wires down into the RA and advanced below the diaphragm. Next, placement of an 8 Fr peel-away sheath over the first guidewire was performed. A permanent RV single coil ICD lead was then advanced under fluoroscopic guidance via the 8 Fr sheath into the RV in a stable position with satisfactory sensing and pacing characteristics. The peel away sheath was removed. A 6 Fr Safe-sheath was then placed in the second guidewire. A permanent pacing lead was advanced under fluoroscopic guidance and positioned in the right atrium. Stable RA appendage position with satisfactory sensing and pacing characteristics was obtained. The sheath was peeled. The leads were sutured to the underlying pectoralis fascia using 2-0 Ethibond. The lead slack and position was assessed under fluoroscopy while securing the lead collars to ensure proper length. Final lead testing via the pacing system analyzer (PSA) demonstrated stable sensing, impedance and pacing thresholds. The lead pins were then cleaned with antibiotic soaked gauze, dried gently, and attached to a new ICD generator. Pins were directly observed to pass the tip electrode, and the ring hex wrench screws were secured, and leads tug tested. The device and leads were gently positioned within the pocket and a retention suture was placed after the pocket was irrigated copiously with a saline antimicrobial solution. The wound was closed with 2 layers of 3-0 monocryl (Stratafix) suture followed by skin closure with 4-0 V-Loc. Exofin solution was placed over the wound, allowed to dry, and then a sterile Aquacel dressing was placed over the wound. Fluoroscopic images were interpreted by me, in multiple projections. DFT testing was not performed. The patient tolerated the procedure and there were no complications. The patient was allowed to awake from anesthesia and transferred to the recovery area in stable condition.     Device and Lead Information  Pulse Generator Model #  Serial # Location Implant/Explant   [id number] [facility] [id number] Left Pectoral Implant 03.07.2022     Lead Model Number  Serial Number Lead position Implant/Explant   [id number] [facility] [id number] [place] Implant 03.07.2022   RV [place] [place] [id number] RV [place] Implant 03.07.2022     Lead Sensitivity and Threshold  Lead R or P sensitivity (mv) Threshold (V) Threshold PW (msec) Impedance (ohms) Final output Voltage (V) Final PW (msec)   RA 1.8 1.8 0.4 530 3.5 0.4   RV 16.5 0.4 0.4 589 3.5 0.4     Bradycardia Settings  Regino Mode LRL URL Pace AVD (ms) Sense AVD (ms) Rate Response Mode Switching Mode SW Rate   DDD 60 120 350 350 Off On 150     Tachycardia Settings  Zone Type VT1 VT2 VF   ON/OFF/  MONITOR ON ON ON   Zone Rate 140 170 220   1st Therapy Type ATP ATP ATP   Energy (J) X4 X4 X1   2nd Therapy Type  Shock Shock   Energy (J)  31J 41J   3rd Therapy Type  Shock Shock   Energy (J)  41J 41J   4th Therapy Type  Shock Shock   Energy (J)  41J 41J   5th Therapy Type  Shock Shock   Energy (J)  41J 41J   6th Therapy Type  Shock Shock   Energy (J)  41J x2 41J x4     Defibrillation Threshold Testing  DFT# How induced Successful test? Shock Imped (ohms) Energy (J) Charge time (sec) Rescue needed? Defib threshold (J)   1 Did not test  81           IMPRESSION: Successful Dual Chamber ICD implant. MRI conditional.     PLAN: Ongoing admission.   -Start Eliqius tomorrow if stable pocket. -ASA indefinitely.   -Routine CIED instructions.  -Device clinic follow up in 1-2 weeks. Steven Maurice.  Matthew Hopkins MD, [address]  Clinical Cardiac Electrophysiology  [facility] Card[facility]    3/7/2022  3:38 PM

## 2022-03-07 NOTE — PROGRESS NOTES
Report received from [id number] and [employer]. Procedural findings communicated. Intra procedural  medication administration reviewed. Progression of care discussed. Patient received into [id number] [facility] post procedure.      Incision site without bleeding or swelling     Dressing dry and intact     Patient instructed to limit movement to right upper extremity    Routine post procedural vital signs and site assessment initiated

## 2022-03-07 NOTE — ANESTHESIA POSTPROCEDURE EVALUATION
Procedure(s):  INSERT ICD DUAL. total IV anesthesia    Anesthesia Post Evaluation      Multimodal analgesia: multimodal analgesia not used between 6 hours prior to anesthesia start to PACU discharge  Patient location during evaluation: bedside  Patient participation: complete - patient participated  Level of consciousness: awake and alert  Pain management: adequate  Airway patency: patent  Anesthetic complications: no  Cardiovascular status: hemodynamically stable  Respiratory status: acceptable  Hydration status: acceptable        INITIAL Post-op Vital signs:   Vitals Value Taken Time   BP 89/61 03/07/22 1555   Temp 37 °C (98.6 °F) 03/07/22 1545   Pulse 74 03/07/22 1555   Resp 14 03/07/22 1545   SpO2 94 % 03/07/22 1555   Vitals shown include unvalidated device data.

## 2022-03-07 NOTE — PROGRESS NOTES
Pt is being referred for an Implantable Cardioverter- Defibrillator (ICD). Pt was admitted for sustained ventricular tachycardia. Patient had ECHO showing severely reduced LV systolic function with EF 30-35%. Pt was seen in consult by EP for ventricular tachycardia and for consideration of ICD implantation. Pt agrees to ICD implant due to ventricular tachycardia. I have discussed and allowed the pt to ask questions regarding ICD. Pt was provided with a Shared Decision ICD booklet.

## 2022-03-07 NOTE — ANESTHESIA PREPROCEDURE EVALUATION
Anesthetic History   No history of anesthetic complications            Review of Systems / Medical History  Patient summary reviewed and pertinent labs reviewed    Pulmonary    COPD: moderate    Sleep apnea: CPAP           Neuro/Psych   Within defined limits           Cardiovascular    Hypertension: well controlled        Dysrhythmias (AFib and NSVT)   Past MI (1999), CAD, PAD, cardiac stents (1999) and hyperlipidemia    Exercise tolerance: <4 METS  Comments: ECHO 3/5/2022 showing mild MR and EF 30-35%. Cath 3/5/22 showing stable, chronically occluded RCA with severely reduced LVEF.    GI/Hepatic/Renal           Hiatal hernia     Endo/Other    Diabetes (Diabetic neuropathy): well controlled, type 2    Obesity and arthritis (OA)     Other Findings            Physical Exam    Airway  Mallampati: III  TM Distance: 4 - 6 cm  Neck ROM: normal range of motion   Mouth opening: Normal     Cardiovascular    Rhythm: regular  Rate: normal         Dental    Dentition: Lower partial plate, Poor dentition and Full upper dentures     Pulmonary        Wheezes:bilateral         Abdominal         Other Findings            Anesthetic Plan    ASA: 4  Anesthesia type: total IV anesthesia            Anesthetic plan and risks discussed with: Patient and Spouse

## 2022-03-07 NOTE — CONSULTS
[facility] CARDIOLOGY  [address] [facility], [address] [facility], [address] [place] [address]  PHONE: [phone]        22        NAME:  Britni Zambrano  : 1945  MRN: [id number]     Referring Cardiologist: Kelly Ge MD    Reason for Consultation: Ventricular tachycardia     ASSESSMENT and PLAN:  Diagnoses and all orders for this visit:    1. Ventricular tachycardia ([address]    2. Atrial fibrillation with RVR ([address]    3. Chronic obstructive pulmonary disease, unspecified COPD type ([address]    4. Coronary artery disease involving native coronary artery of native heart without angina pectoris    5. DM (diabetes mellitus) type II, controlled, with peripheral vascular disorder ([address]    6. Primary hypertension    7. Chest pain    8. HFrEF (heart failure with reduced ejection fraction) ([address]    9. V-tach ([facility]    67year old male with ICM, EF 30-35%, NYHA class II with VT requiring secondary prevention ICD implant by the guidelines. He did undergo LHC but no PCI was warranted. He will proceed with ICD implant.     -Plan for DC ICD. -Continue amiodarone and GDMT.  -Start [facility] tomorrow if stable pocket. The patient has been referred to for an [facility] [address] (ICD). The cardiologist has discussed and allowed the patient to ask questions regarding ICD. he was provided with a Shared Decision ICD decision making tool (booklet) today in clinic. ICD  I discussed in detail the potential benefits of ICD therapy, including improved mortality and prevention of SCD. Additionally, I discussed with the patient the potential risks of ICD implantation, including the risk of bleeding, infection, venous occlusion, DVT/PE, pneumothorax, cardiac tamponade, perforation, need for urgent open heart surgery, device/lead failure, lead dislodgement, inappropriate shock(s), heart attack, stroke, arrhythmia, radiation skin injury, kidney damage/failure oversedation, respiratory arrest, and even death.   The patient understands these risks in the context of the potential benefits of the device implantation, and agrees to proceed. DFT  I discussed with the patient the potential risks of DFT tesing including the risk of device/lead failure, lead dislodgement, heart attack, stroke, arrhythmia, oversedation, respiratory arrest, and even death. The patient understands these risks in the context of the potential benefits and agrees to proceed. DEVICE ON DUAL ANTIPLT  The patient has CAD with recent PCI and is on dual antiplatelet therapy. The patient is aware of the increased of significant hematoma and bleeding, but we reviewed the recent data suggesting that device implantation on dual antiplatelet therapy is necessary and reasonable in these cases. TOTAL TIME: 45 minutes, >50% during counseling and coordination of care      Thank you for allowing me to participate in the electrophysiologic care of Mr. Eugenio Nevarez. Please contact me if any questions or concerns were to arise. Huan Claudio MD, MS  Clinical Cardiac Electrophysiology  [facility]  03/07/22  1:04 PM    ===================================================================  Chief Complant:    Chief Complaint   Patient presents with    Chest Pain        Consultation is requested by No ref. provider found for evaluation of Chest Pain      History:  Eugenio Nevarez is a most pleasant 67 y.o. male with a past medical and cardiac history significant for HTN, DM, NOMI on CPAP, COPD (severe) (smoked 1 ppd x 50 years quit in 2011), Covid w long hospitalization  to 3-2021 (had completely recovered), PAD, CAD w LHC  w PCI to [place] [id number], nuke  w EF 40% and moderately abnormal. No h/o CVA, TIA or bleeding. He presented with chest pain and underwent LHC without intervention and stable disease. He had been doing well, went outside to cut up some tree branches, kept pulling the chain saw and couldn't get it to start.   His arms got tired and he was very fatigued and went inside to rest.  He drank a glass of water and his arms started hurting and then he developed a dull constant pain across his chest radiating to his arms with diaphoresis but no nausea or SOB. Pain was constant 5/10. He took two  nitro with no relief of pain. No palpitations, dizziness or syncope. No LE edema though his legs hurt with ambulation and Triston Giron out' quickly. He checked his vital signs and HR was 220. Wife called 911 and EMS arrived and pt in monomorphic VT. Started IV amio with no improvement and IV lidocaine started and pt converted to a fib. EKG on arrival at ER a fib w rate 95 w QTc 405. Labs pending. Pt has no cardiac complaints at this time. /75. CXR pending. He is compliant with asa/plavix, no missed meds. Since admission: underwent a LHC, no intervention. VT on amiodarone and was in AF s/p cardioversion.      Prior cardiac eval:  CAD w LHC  w PCI to OM1  nuke  w EF 40% and moderately abnormal     SH: smoked 1 ppd x 50 years quit in   FH: Father w MI around age 76  Covid: Vax x 2 w booster     Cardiac PMH: (Old records have been reviewed and summarized below)    EKG:  (EKG has been independently visualized by me with interpretation below): NSR, FDAVB, normal axis, PACs. ECHO: 3/5/2022    Left Ventricle: Left ventricle is moderately dilated. Mildly increased wall thickness. Akinesis of the following segments: basal inferior, basal inferolateral, mid inferior, mid inferolateral, apical lateral and apical inferior. Severely reduced left ventricular systolic function with a visually estimated EF of 30 - 35%.   Mitral Valve: Mild annular calcification of the mitral valve. Mild transvalvular regurgitation.   Left Atrium: Left atrium is moderately dilated. No left atrial appendage thrombus noted. No left atrial appendage mass noted.   Right Atrium: Right atrium is moderately dilated.     Successful cardioversion with restoration of sinus rhythm with PACs and PVCs    Previous Heart Catheterization: 3/5/2022  · Patient with bilateral lower extremity bypass with extensive scar tissue and severe morbid obesity. Initial attempts at left radial access due to poor Pedro's on right were unsuccessful. Ultrasound-guided access was utilized. Unable to advance the wire past the mid forearm. Left radial access was aborted. Ultrasound guidance was utilized right radial access was obtained. The procedure was performed via right radial. Pneumatic band hemostasis  · Dilated left ventricle with severe LV systolic dysfunction severely elevated end-diastolic pressure consistent with acute on chronic systolic heart failure. · Severe calcified left main LAD with moderate LAD stenosis. · Moderate calcification left circumflex with widely patent second obtuse marginal stenting. · Chronically occluded right coronary artery with left-to-right collaterals to a small posterior lateral branch. PDA is not well visualized. This remains unchanged from 2018. Stress Test: 11/2018  CONCLUSION:   1. Stress EKG: Normal.   2. SPECT Perfusion Imaging: Normal Perfusion. 3. LV Systolic Function is  moderately abnormal. There is TID noted which   is c/w with multivessel disease with balanced ischemia   4. Risk Assessment:  Moderate to high risk     DEVICE INTERROGATION: n/a     Past Medical History, Past Surgical History, Family history, Social History, and Medications were all reviewed with the patient today and updated as necessary.      Current Facility-Administered Medications   Medication Dose Route Frequency Provider Last Rate Last Admin    sterile water irrigation 1,000 mL with neomycin-polymyxin B  1 mL irrigation solution   Irrigation ONCE Cait Claudio MD        carvediloL (COREG) tablet 12.5 mg  12.5 mg Oral BID WITH MEALS Armen Schmidt MD   12.5 mg at 03/07/22 0951    amiodarone (CORDARONE) tablet 400 mg  400 mg Oral BID Miguel Ángel Burgess MD   400 mg at 03/07/22 7765    sodium chloride (NS) flush 5-10 mL  5-10 mL IntraVENous Q8H LINDA Vargas   10 mL at 03/07/22 6996    sodium chloride (NS) flush 5-10 mL  5-10 mL IntraVENous PRN LINDA Vargas        heparin 25,000 units in dextrose 500 mL infusion  12-25 Units/kg/hr (Adjusted) IntraVENous TITRATE LINDA Vargas 44.5 mL/hr at 03/07/22 0700 22 Units/kg/hr at 03/07/22 0700    albuterol (PROVENTIL VENTOLIN) nebulizer solution 2.5 mg  2.5 mg Nebulization Q4H PRN LINDA Vargas        valsartan (DIOVAN) tablet 320 mg  320 mg Oral DAILY LINDA Vargas   320 mg at 03/07/22 5388    aspirin chewable tablet 81 mg  81 mg Oral QHS LINDA Vargas   81 mg at 03/06/22 2045    cilostazoL (PLETAL) tablet 50 mg  50 mg Oral BID LINDA Vargas   50 mg at 03/07/22 0761    clopidogreL (PLAVIX) tablet 75 mg  75 mg Oral DAILY LINDA Vargas   75 mg at 03/07/22 9485    budesonide-formoterol (SYMBICORT) 80-4.5 mcg inhaler  2 Puff Inhalation BID RT LINDA Vargas   2 Puff at 03/07/22 4694    fluticasone propionate (FLONASE) 50 mcg/actuation nasal spray 2 Spray  2 Spray Both Nostrils DAILY LINDA Vargas   2 Hagerman at 03/07/22 7166    glipiZIDE (GLUCOTROL) tablet 10 mg  10 mg Oral BID LINDA Vargas   10 mg at 03/07/22 1102    insulin lispro (HUMALOG) injection   SubCUTAneous AC&HS LINDA Vargas   2 Units at 03/05/22 1700    alogliptin (NESINA) tablet 12.5 mg  12.5 mg Oral DAILY LINDA Vargas   12.5 mg at 03/07/22 0129    magnesium oxide (MAG-OX) tablet 400 mg  400 mg Oral DAILY LINDA Vargas   400 mg at 03/07/22 2899    montelukast (SINGULAIR) tablet 10 mg  10 mg Oral QHS LINDA Vargas   10 mg at 03/06/22 2045    polyethylene glycol (MIRALAX) packet 17 g  17 g Oral DAILY LINDA Vargas        rosuvastatin (CRESTOR) tablet 10 mg  10 mg Oral DAILY LINDA Vargas   10 mg at 03/07/22 9813  sodium chloride (NS) flush 5-40 mL  5-40 mL IntraVENous PRN LINDA Arevalo        nitroglycerin (NITROSTAT) tablet 0.4 mg  0.4 mg SubLINGual Q5MIN PRN LINDA Arevalo        morphine injection 2 mg  2 mg IntraVENous Q4H PRN Baylee [place], PA        acetaminophen (TYLENOL) tablet 650 mg  650 mg Oral Q4H PRN Baylee HarperrLINDA hanna        HYDROcodone-acetaminophen (NORCO) 7.5-325 mg per tablet 1 Tablet  1 Tablet Oral Q4H PRN Baylee Harperrles, PA        promethazine (PHENERGAN) tablet 25 mg  25 mg Oral Q6H PRN Baylee Jairo, PA         Allergies   Allergen Reactions    Azithromycin Hives    Daypro [Oxaprozin] Other (comments)     ELEVATED BLOOD PRESSURE     Patient Active Problem List    Diagnosis    Chest pain    Ventricular tachycardia (HCC)    Atrial fibrillation (HCC)    VT (ventricular tachycardia) (HCC)    Acute metabolic encephalopathy    Irregular heart beat    PVC's (premature ventricular contractions)    Elevated troponin    Toe laceration    Type 2 diabetes with nephropathy (HCC)    Hypoxia    Sepsis (HCC)    Abnormal nuclear cardiac imaging test    Cigarette nicotine dependence in remission    Chronic pain of right knee    Obstructive sleep apnea    Intermittent spinal claudication (HCC)    Pulmonary emphysema (HCC)    Asthma; SEASONAL    Encounter for long-term (current) drug use    Vertiginous syndromes & LABYRINTHINE DISORDERS/UNSPEC    Orthostatic hypotension    Allergic rhinitis, CAUSE UNSPECIFIED    Complicated wound infection    Osteoarthritis    Hiatal hernia    Benign neoplasm of colon    PAD (peripheral artery disease) (HCC)    H/O endarterectomy; 9/2014    Diabetic neuropathy     Hyperlipidemia    S/P angioplasty with stent, (1997)    COPD (chronic obstructive pulmonary disease) ([facility]    Arterial degeneration     11/6/14 (Dr Magali Trinidad) 1. Bleeding from right groin wound.    2. Disruption of right common femoral artery patch anastomosis and possible arterial infection.  Normocytic anemia    AGUSTIN (acute kidney injury) ([address]    DM (diabetes mellitus) type II, controlled, with peripheral vascular disorder ([address]    Atherosclerosis of native arteries of extremity with intermittent claudication ([address]     9/11/14 (Dr Bernarda Hollis) Bilateral common femoral endarterectomies.  Personal history of tobacco use, presenting hazards to health    Asbestos exposure    Ischemic cardiomyopathy    Coronary artery disease involving native coronary artery of native heart without angina pectoris    HTN (hypertension)    Sleep apnea    Severe obesity (BMI 35.0-39. 9) with comorbidity [facility])       Past Medical History:   Diagnosis Date    Acute respiratory failure with hypoxia ([address] 10/23/2014    AGUSTIN (acute kidney injury) ([address] 9/15/2014    Allergic rhinitis, CAUSE UNSPECIFIED 11/10/2015    Asthma; SEASONAL 11/10/2015    Benign essential hypertension 11/10/2015    Benign neoplasm of colon 11/10/2015    CAD (coronary artery disease) 1998, 1999    mix2, 3 stents [id number]    CAD (coronary artery disease) 11/10/2015    CAP (community acquired pneumonia) 12/31/2020    Cardiomyopathy ([address] 78/34/9808    Complicated wound infection 11/10/2015    COPD /OTHER 11/10/2015    COPD exacerbation ([address] 10/12/2011    COVID-19 12/31/2020    Diabetes mellitus type 2, controlled ([address] 11/10/2015    Diabetic neuropathy  11/10/2015    Disruption of wound, unspecified 10/9/2014    Encounter for long-term (current) use of other high-risk medications 11/10/2015    Extremity atherosclerosis with intermittent claudication ([address] 11/10/2015    H/O endarterectomy; 9/2014 11/10/2015    Hiatal hernia 11/10/2015    Hyperglycemia  11/10/2015    Hyperlipidemia 11/10/2015    Monomorphic ventricular tachycardia ([address] 12/31/2020    Myocardial infarction ([address] 1999    Myocardial infarction ([address] (1999) 11/10/2015    Obesity, UNSPECIFIED 11/10/2015    Orthostatic hypotension 11/10/2015    Osteoarthritis 11/10/2015    Peripheral vascular disease ([facility] 2/2014; S/P BILAT FEMORAL 11/10/2015    PNA (pneumonia) 2/8/2019    PVC (premature ventricular contraction)     Rash 91/44/8759    Seroma complicating a procedure 10/2/2014    Sleep apnea     cpap    Uncontrolled type 2 diabetes mellitus ([facility] 11/10/2015    Vertiginous syndromes & LABYRINTHINE DISORDERS/UNSPEC 11/10/2015     Past Surgical History:   Procedure Laterality Date    HX CORONARY STENT PLACEMENT  1999    HX CORONARY STENT PLACEMENT  12/05/2018    LCX OM1:2.5 x 12 Yoel MIRELA    HX HEART CATHETERIZATION  12/05/2018    LV EF=40%. LM:nl. LAD:30-40% mid stenosis. LCX OM1:95% stenosis. RCA:100% occlusion at RV branch.  DC ABDOMEN SURGERY PROC UNLISTED  1960    abdominal cyst removed    DC CARDIAC SURG PROCEDURE UNLIST  1999    stents x3    VASCULAR SURGERY PROCEDURE UNLIST  9/11/14    sally femoral endarterectomy    VASCULAR SURGERY PROCEDURE UNLIST  11/5/14    Repair of right common femoral artery      Family History   Problem Relation Age of Onset    Stroke Mother     Hypertension Mother     Breast Cancer Mother     Heart Attack Father     Heart Disease Father     Other Father         DIVERTICULITIS    Lung Disease Brother         mesothioloma    Diabetes Sister     Crohn's Disease Sister      Social History     Tobacco Use    Smoking status: Former Smoker     Packs/day: 1.00     Years: 50.00     Pack years: 50.00     Quit date: 10/2/2011     Years since quitting: 10.4    Smokeless tobacco: Current User     Types: Chew   Substance Use Topics    Alcohol use: Yes     Alcohol/week: 0.0 standard drinks     Comment: rare       ROS:  A comprehensive review of systems was performed with the pertinent positives and negatives as noted in the HPI in addition to:    Review of Systems   Constitutional: Negative. HENT: Negative. Eyes: Negative. Respiratory: Negative. Cardiovascular: Negative. Gastrointestinal: Negative. Genitourinary: Negative. Musculoskeletal: Negative. Skin: Negative. Neurological: Negative. Endo/Heme/Allergies: Negative. Psychiatric/Behavioral: Negative. PHYSICAL EXAM:     Visit Vitals  BP (!) 108/58   Pulse 65   Temp 97.6 °F (36.4 °C)   Resp 16   Ht 6' 1\" (1.854 m)   Wt 280 lb (127 kg)   SpO2 95%   BMI 36.94 kg/m²        Wt Readings from Last 3 Encounters:   03/07/22 280 lb (127 kg)   03/01/22 293 lb (132.9 kg)   01/06/22 280 lb (127 kg)     BP Readings from Last 3 Encounters:   03/07/22 (!) 108/58   03/01/22 126/68   01/06/22 129/66       Gen: Well appearing, well developed, no acute distress  Eyes: Pupils equal, round. Extraocular movements are intact  ENT: Oropharynx clear, no oral lesions, normal dentition  CV: S1S2, regular rate and rhythm, no murmurs, rubs or gallops, normal JVD, no carotid bruits, normal distal pulses, no ZAHRAA  Pulm: Clear to auscultation bilaterally, no accessory muscle uses, no wheezes or rales  GI: Soft, NT, ND, +BS  Neuro: Alert and oriented, nonfocal  Psych: Appropriate affect  Skin: Normal color and skin turgor  MSK: Normal muscle bulk and tone    Medical problems and test results were reviewed with the patient today.      Lab Results   Component Value Date/Time    Potassium 4.0 03/07/2022 05:02 AM    Potassium, POC 4.0 11/05/2014 07:36 PM     Lab Results   Component Value Date/Time    Creatinine (POC) 1.2 11/17/2017 09:45 AM    Creatinine 1.20 03/07/2022 05:02 AM     Lab Results   Component Value Date/Time    HGB 12.1 (L) 03/04/2022 03:57 PM     Lab Results   Component Value Date/Time    INR 1.2 03/04/2022 03:57 PM    INR 1.1 12/31/2020 08:23 PM    INR 1.1 12/03/2018 12:14 PM    Prothrombin time 15.6 (H) 03/04/2022 03:57 PM    Prothrombin time 14.7 12/31/2020 08:23 PM    Prothrombin time 13.8 12/03/2018 12:14 PM

## 2022-03-08 LAB
ANION GAP SERPL CALC-SCNC: 6 MMOL/L (ref 7–16)
BACTERIA SPEC CULT: NORMAL
BASOPHILS # BLD: 0 K/UL (ref 0–0.2)
BASOPHILS NFR BLD: 0 % (ref 0–2)
BUN SERPL-MCNC: 21 MG/DL (ref 8–23)
CALCIUM SERPL-MCNC: 8.7 MG/DL (ref 8.3–10.4)
CHLORIDE SERPL-SCNC: 105 MMOL/L (ref 98–107)
CO2 SERPL-SCNC: 27 MMOL/L (ref 21–32)
CREAT SERPL-MCNC: 1.7 MG/DL (ref 0.8–1.5)
DIFFERENTIAL METHOD BLD: ABNORMAL
EOSINOPHIL # BLD: 0 K/UL (ref 0–0.8)
EOSINOPHIL NFR BLD: 0 % (ref 0.5–7.8)
ERYTHROCYTE [DISTWIDTH] IN BLOOD BY AUTOMATED COUNT: 15.2 % (ref 11.9–14.6)
GLUCOSE BLD STRIP.AUTO-MCNC: 123 MG/DL (ref 65–100)
GLUCOSE BLD STRIP.AUTO-MCNC: 155 MG/DL (ref 65–100)
GLUCOSE BLD STRIP.AUTO-MCNC: 160 MG/DL (ref 65–100)
GLUCOSE BLD STRIP.AUTO-MCNC: 198 MG/DL (ref 65–100)
GLUCOSE SERPL-MCNC: 121 MG/DL (ref 65–100)
HCT VFR BLD AUTO: 34 % (ref 41.1–50.3)
HGB BLD-MCNC: 11.4 G/DL (ref 13.6–17.2)
IMM GRANULOCYTES # BLD AUTO: 0.1 K/UL (ref 0–0.5)
IMM GRANULOCYTES NFR BLD AUTO: 1 % (ref 0–5)
LACTATE SERPL-SCNC: 1.1 MMOL/L (ref 0.4–2)
LYMPHOCYTES # BLD: 0.8 K/UL (ref 0.5–4.6)
LYMPHOCYTES NFR BLD: 6 % (ref 13–44)
MAGNESIUM SERPL-MCNC: 2.2 MG/DL (ref 1.8–2.4)
MCH RBC QN AUTO: 32.8 PG (ref 26.1–32.9)
MCHC RBC AUTO-ENTMCNC: 33.5 G/DL (ref 31.4–35)
MCV RBC AUTO: 97.7 FL (ref 79.6–97.8)
MONOCYTES # BLD: 1.4 K/UL (ref 0.1–1.3)
MONOCYTES NFR BLD: 10 % (ref 4–12)
NEUTS SEG # BLD: 11.9 K/UL (ref 1.7–8.2)
NEUTS SEG NFR BLD: 83 % (ref 43–78)
NRBC # BLD: 0 K/UL (ref 0–0.2)
PLATELET # BLD AUTO: 162 K/UL (ref 150–450)
PMV BLD AUTO: 10.6 FL (ref 9.4–12.3)
POTASSIUM SERPL-SCNC: 4.3 MMOL/L (ref 3.5–5.1)
PROCALCITONIN SERPL-MCNC: 1.11 NG/ML (ref 0–0.49)
RBC # BLD AUTO: 3.48 M/UL (ref 4.23–5.6)
SERVICE CMNT-IMP: ABNORMAL
SERVICE CMNT-IMP: NORMAL
SODIUM SERPL-SCNC: 138 MMOL/L (ref 136–145)
WBC # BLD AUTO: 14.4 K/UL (ref 4.3–11.1)

## 2022-03-08 PROCEDURE — 87040 BLOOD CULTURE FOR BACTERIA: CPT

## 2022-03-08 PROCEDURE — 84145 PROCALCITONIN (PCT): CPT

## 2022-03-08 PROCEDURE — 74011000250 HC RX REV CODE- 250: Performed by: INTERNAL MEDICINE

## 2022-03-08 PROCEDURE — 94760 N-INVAS EAR/PLS OXIMETRY 1: CPT

## 2022-03-08 PROCEDURE — 74011250636 HC RX REV CODE- 250/636: Performed by: INTERNAL MEDICINE

## 2022-03-08 PROCEDURE — 87449 NOS EACH ORGANISM AG IA: CPT

## 2022-03-08 PROCEDURE — 77010033678 HC OXYGEN DAILY

## 2022-03-08 PROCEDURE — 74011250637 HC RX REV CODE- 250/637: Performed by: INTERNAL MEDICINE

## 2022-03-08 PROCEDURE — 74011000258 HC RX REV CODE- 258: Performed by: INTERNAL MEDICINE

## 2022-03-08 PROCEDURE — 2709999900 HC NON-CHARGEABLE SUPPLY

## 2022-03-08 PROCEDURE — 83735 ASSAY OF MAGNESIUM: CPT

## 2022-03-08 PROCEDURE — 87899 AGENT NOS ASSAY W/OPTIC: CPT

## 2022-03-08 PROCEDURE — 83605 ASSAY OF LACTIC ACID: CPT

## 2022-03-08 PROCEDURE — 99232 SBSQ HOSP IP/OBS MODERATE 35: CPT | Performed by: INTERNAL MEDICINE

## 2022-03-08 PROCEDURE — 74011250636 HC RX REV CODE- 250/636: Performed by: NURSE PRACTITIONER

## 2022-03-08 PROCEDURE — 82962 GLUCOSE BLOOD TEST: CPT

## 2022-03-08 PROCEDURE — 65660000000 HC RM CCU STEPDOWN

## 2022-03-08 PROCEDURE — 74011636637 HC RX REV CODE- 636/637: Performed by: INTERNAL MEDICINE

## 2022-03-08 PROCEDURE — 36415 COLL VENOUS BLD VENIPUNCTURE: CPT

## 2022-03-08 PROCEDURE — 74011000258 HC RX REV CODE- 258: Performed by: NURSE PRACTITIONER

## 2022-03-08 PROCEDURE — 80048 BASIC METABOLIC PNL TOTAL CA: CPT

## 2022-03-08 PROCEDURE — 87641 MR-STAPH DNA AMP PROBE: CPT

## 2022-03-08 PROCEDURE — 85025 COMPLETE CBC W/AUTO DIFF WBC: CPT

## 2022-03-08 PROCEDURE — 77030027138 HC INCENT SPIROMETER -A

## 2022-03-08 PROCEDURE — 94640 AIRWAY INHALATION TREATMENT: CPT

## 2022-03-08 RX ORDER — VANCOMYCIN HYDROCHLORIDE
1250 EVERY 24 HOURS
Status: DISCONTINUED | OUTPATIENT
Start: 2022-03-09 | End: 2022-03-10

## 2022-03-08 RX ADMIN — AMIODARONE HYDROCHLORIDE 400 MG: 200 TABLET ORAL at 08:31

## 2022-03-08 RX ADMIN — CARVEDILOL 12.5 MG: 12.5 TABLET, FILM COATED ORAL at 08:31

## 2022-03-08 RX ADMIN — VANCOMYCIN HYDROCHLORIDE 2500 MG: 10 INJECTION, POWDER, LYOPHILIZED, FOR SOLUTION INTRAVENOUS at 03:40

## 2022-03-08 RX ADMIN — PIPERACILLIN AND TAZOBACTAM 3.38 G: 3; .375 INJECTION, POWDER, LYOPHILIZED, FOR SOLUTION INTRAVENOUS; PARENTERAL at 10:08

## 2022-03-08 RX ADMIN — DOXYCYCLINE 100 MG: 100 INJECTION, POWDER, LYOPHILIZED, FOR SOLUTION INTRAVENOUS at 21:52

## 2022-03-08 RX ADMIN — POLYETHYLENE GLYCOL 3350 17 G: 17 POWDER, FOR SOLUTION ORAL at 08:31

## 2022-03-08 RX ADMIN — MAGNESIUM OXIDE TAB 400 MG (241.3 MG ELEMENTAL MG) 400 MG: 400 (241.3 MG) TAB at 08:31

## 2022-03-08 RX ADMIN — APIXABAN 5 MG: 5 TABLET, FILM COATED ORAL at 17:22

## 2022-03-08 RX ADMIN — SODIUM CHLORIDE, PRESERVATIVE FREE 10 ML: 5 INJECTION INTRAVENOUS at 05:53

## 2022-03-08 RX ADMIN — CARVEDILOL 12.5 MG: 12.5 TABLET, FILM COATED ORAL at 17:21

## 2022-03-08 RX ADMIN — INSULIN LISPRO 2 UNITS: 100 INJECTION, SOLUTION INTRAVENOUS; SUBCUTANEOUS at 15:57

## 2022-03-08 RX ADMIN — MONTELUKAST 10 MG: 10 TABLET, FILM COATED ORAL at 21:52

## 2022-03-08 RX ADMIN — SODIUM CHLORIDE, PRESERVATIVE FREE 10 ML: 5 INJECTION INTRAVENOUS at 14:53

## 2022-03-08 RX ADMIN — SODIUM CHLORIDE, PRESERVATIVE FREE 10 ML: 5 INJECTION INTRAVENOUS at 21:52

## 2022-03-08 RX ADMIN — CILOSTAZOL 50 MG: 50 TABLET ORAL at 08:32

## 2022-03-08 RX ADMIN — AMIODARONE HYDROCHLORIDE 400 MG: 200 TABLET ORAL at 17:21

## 2022-03-08 RX ADMIN — HYDROCODONE BITARTRATE AND ACETAMINOPHEN 1 TABLET: 7.5; 325 TABLET ORAL at 22:04

## 2022-03-08 RX ADMIN — SODIUM CHLORIDE, PRESERVATIVE FREE 10 ML: 5 INJECTION INTRAVENOUS at 08:33

## 2022-03-08 RX ADMIN — ROSUVASTATIN CALCIUM 10 MG: 10 TABLET, FILM COATED ORAL at 08:32

## 2022-03-08 RX ADMIN — PIPERACILLIN AND TAZOBACTAM 3.38 G: 3; .375 INJECTION, POWDER, LYOPHILIZED, FOR SOLUTION INTRAVENOUS; PARENTERAL at 17:21

## 2022-03-08 RX ADMIN — CILOSTAZOL 50 MG: 50 TABLET ORAL at 17:22

## 2022-03-08 RX ADMIN — CLOPIDOGREL BISULFATE 75 MG: 75 TABLET ORAL at 08:31

## 2022-03-08 RX ADMIN — FLUTICASONE PROPIONATE 2 SPRAY: 50 SPRAY, METERED NASAL at 08:34

## 2022-03-08 RX ADMIN — BUDESONIDE AND FORMOTEROL FUMARATE DIHYDRATE 2 PUFF: 80; 4.5 AEROSOL RESPIRATORY (INHALATION) at 08:42

## 2022-03-08 RX ADMIN — INSULIN LISPRO 2 UNITS: 100 INJECTION, SOLUTION INTRAVENOUS; SUBCUTANEOUS at 11:20

## 2022-03-08 RX ADMIN — BUDESONIDE AND FORMOTEROL FUMARATE DIHYDRATE 2 PUFF: 80; 4.5 AEROSOL RESPIRATORY (INHALATION) at 20:44

## 2022-03-08 RX ADMIN — INSULIN LISPRO 2 UNITS: 100 INJECTION, SOLUTION INTRAVENOUS; SUBCUTANEOUS at 21:51

## 2022-03-08 RX ADMIN — VALSARTAN 320 MG: 320 TABLET, FILM COATED ORAL at 08:31

## 2022-03-08 RX ADMIN — DOXYCYCLINE 100 MG: 100 INJECTION, POWDER, LYOPHILIZED, FOR SOLUTION INTRAVENOUS at 08:32

## 2022-03-08 RX ADMIN — PIPERACILLIN AND TAZOBACTAM 3.38 G: 3; .375 INJECTION, POWDER, LYOPHILIZED, FOR SOLUTION INTRAVENOUS; PARENTERAL at 02:26

## 2022-03-08 NOTE — ROUTINE PROCESS
SBAR given to [facility], [facility].

## 2022-03-08 NOTE — PROGRESS NOTES
VANCO DAILY FOLLOW UP NOTE  [address] [facility] Pharmacokinetic Monitoring Service - Vancomycin    Consulting Provider: Dr. Geovany Prater   Indication: Sepsis of unknown etiology  Target Concentration: Goal AUC/LORA 400-600 mg*hr/L  Day of Therapy: 1  Additional Antimicrobials: Pip/Tazo    Pertinent Laboratory Values: Wt Readings from Last 1 Encounters:   03/08/22 128 kg (282 lb 3 oz)     Temp Readings from Last 1 Encounters:   03/08/22 98.1 °F (36.7 °C)     No components found for: PROCAL  Recent Labs     03/08/22  0355 03/07/22  2319 03/07/22  0502   BUN 21 18 15   CREA 1.70* 1.50 1.20   WBC  --  11.8*  --    PCT 1.11*  --   --    LAC 1.1  --   --      Estimated Creatinine Clearance: 51 mL/min (A) (based on SCr of 1.7 mg/dL (H)). Lab Results   Component Value Date/Time    Vancomycin,trough 12.8 11/07/2014 08:00 PM       MRSA Nasal Swab: N/A. Non-respiratory infection. .      Assessment:  Date/Time Dose Concentration AUC         Note: Serum concentrations collected for AUC dosing may appear elevated if collected in close proximity to the dose administered, this is not necessarily an indication of toxicity    Plan:  Initiated on vancomycin overnight, will continue current regimen of 1250 mg every 24 hours  Repeat vancomycin concentrations will be ordered as clinically appropriate   Pharmacy will continue to monitor patient and adjust therapy as indicated    Thank you for the consult,  Ivory Vicente, DomenicD

## 2022-03-08 NOTE — PROGRESS NOTES
Problem: Falls - Risk of  Goal: *Absence of Falls  Description: Document Maggi Don Fall Risk and appropriate interventions in the flowsheet. Outcome: Progressing Towards Goal  Note: Fall Risk Interventions:  Mobility Interventions: Bed/chair exit alarm,Patient to call before getting OOB,Strengthening exercises (ROM-active/passive)         Medication Interventions: Bed/chair exit alarm,Evaluate medications/consider consulting pharmacy,Patient to call before getting OOB,Teach patient to arise slowly    Elimination Interventions: Bed/chair exit alarm,Call light in reach,Patient to call for help with toileting needs,Urinal in reach,Toileting schedule/hourly rounds              Problem: Pressure Injury - Risk of  Goal: *Prevention of pressure injury  Description: Document Hemant Scale and appropriate interventions in the flowsheet.   Outcome: Progressing Towards Goal  Note: Pressure Injury Interventions:       Moisture Interventions: Absorbent underpads,Maintain skin hydration (lotion/cream),Minimize layers    Activity Interventions: Increase time out of bed,Pressure redistribution bed/mattress(bed type)    Mobility Interventions: HOB 30 degrees or less,Pressure redistribution bed/mattress (bed type)    Nutrition Interventions: Document food/fluid/supplement intake,Offer support with meals,snacks and hydration

## 2022-03-08 NOTE — PROGRESS NOTES
Bedside and Verbal shift change report given to Self (oncoming nurse) by Felecia Velasquez RN (offgoing nurse). Report included the following information SBAR, Kardex, MAR and Cardiac Rhythm SR with PVC's.

## 2022-03-08 NOTE — PROGRESS NOTES
Problem: Falls - Risk of  Goal: *Absence of Falls  Description: Document Darlen Herndon Fall Risk and appropriate interventions in the flowsheet. 3/8/2022 1427 by Monia Galeazzi, RN  Outcome: Progressing Towards Goal  Note: Fall Risk Interventions:  Mobility Interventions: Bed/chair exit alarm,Patient to call before getting OOB,PT Consult for mobility concerns         Medication Interventions: Bed/chair exit alarm,Patient to call before getting OOB,Teach patient to arise slowly    Elimination Interventions: Bed/chair exit alarm,Call light in reach,Patient to call for help with toileting needs,Toilet paper/wipes in reach,Urinal in reach           3/8/2022 1425 by Monia Galeazzi, RN  Outcome: Progressing Towards Goal  Note: Fall Risk Interventions:  Mobility Interventions: Bed/chair exit alarm,Patient to call before getting OOB,PT Consult for mobility concerns         Medication Interventions: Bed/chair exit alarm,Patient to call before getting OOB,Teach patient to arise slowly    Elimination Interventions: Bed/chair exit alarm,Call light in reach,Patient to call for help with toileting needs,Toilet paper/wipes in reach,Urinal in reach              Problem: Pain  Goal: *Control of Pain  3/8/2022 1427 by Monia Galeazzi, RN  Outcome: Progressing Towards Goal  3/8/2022 1425 by Monia Galeazzi, RN  Outcome: Progressing Towards Goal     Problem: Pressure Injury - Risk of  Goal: *Prevention of pressure injury  Description: Document Hemant Scale and appropriate interventions in the flowsheet.   3/8/2022 1427 by Monia Galeazzi, RN  Outcome: Progressing Towards Goal  Note: Pressure Injury Interventions:       Moisture Interventions: Maintain skin hydration (lotion/cream),Minimize layers    Activity Interventions: Increase time out of bed,Pressure redistribution bed/mattress(bed type),PT/OT evaluation    Mobility Interventions: HOB 30 degrees or less,Pressure redistribution bed/mattress (bed type),PT/OT evaluation    Nutrition Interventions: Document food/fluid/supplement intake                  3/8/2022 1425 by Estelita Koyanagi, RN  Outcome: Progressing Towards Goal  Note: Pressure Injury Interventions:       Moisture Interventions: Maintain skin hydration (lotion/cream),Minimize layers    Activity Interventions: Increase time out of bed,Pressure redistribution bed/mattress(bed type),PT/OT evaluation    Mobility Interventions: HOB 30 degrees or less,Pressure redistribution bed/mattress (bed type),PT/OT evaluation    Nutrition Interventions: Document food/fluid/supplement intake                     Problem: Airway Clearance - Ineffective  Goal: *Patent airway  Outcome: Progressing Towards Goal  Goal: *Absence of airway secretions  Outcome: Progressing Towards Goal  Goal: *Able to cough effectively  Outcome: Progressing Towards Goal  Goal: *PALLIATIVE CARE:  Alleviation of secretions, cough and/or nasal congestion  Outcome: Progressing Towards Goal     Problem: Patient Education: Go to Patient Education Activity  Goal: Patient/Family Education  Outcome: Progressing Towards Goal     Problem: Patient Education: Go to Patient Education Activity  Goal: Patient/Family Education  Outcome: Progressing Towards Goal     Problem: Pneumonia: Day 1  Goal: Off Pathway (Use only if patient is Off Pathway)  Outcome: Progressing Towards Goal  Goal: Activity/Safety  Outcome: Progressing Towards Goal  Goal: Consults, if ordered  Outcome: Progressing Towards Goal  Goal: Diagnostic Test/Procedures  Outcome: Progressing Towards Goal  Goal: Nutrition/Diet  Outcome: Progressing Towards Goal  Goal: Medications  Outcome: Progressing Towards Goal  Goal: Respiratory  Outcome: Progressing Towards Goal  Goal: Treatments/Interventions/Procedures  Outcome: Progressing Towards Goal  Goal: Psychosocial  Outcome: Progressing Towards Goal  Goal: *Oxygen saturation within defined limits  Outcome: Progressing Towards Goal  Goal: *Influenza vaccine administered (October-March)  Outcome: Progressing Towards Goal  Goal: *Pneumoccocal vaccine administered  Outcome: Progressing Towards Goal  Goal: *Hemodynamically stable  Outcome: Progressing Towards Goal  Goal: *Demonstrates progressive activity  Outcome: Progressing Towards Goal  Goal: *Tolerating diet  Outcome: Progressing Towards Goal     Problem: Pneumonia: Day 2  Goal: Off Pathway (Use only if patient is Off Pathway)  Outcome: Progressing Towards Goal  Goal: Activity/Safety  Outcome: Progressing Towards Goal  Goal: Consults, if ordered  Outcome: Progressing Towards Goal  Goal: Diagnostic Test/Procedures  Outcome: Progressing Towards Goal  Goal: Nutrition/Diet  Outcome: Progressing Towards Goal  Goal: Discharge Planning  Outcome: Progressing Towards Goal  Goal: Medications  Outcome: Progressing Towards Goal  Goal: Respiratory  Outcome: Progressing Towards Goal  Goal: Treatments/Interventions/Procedures  Outcome: Progressing Towards Goal  Goal: Psychosocial  Outcome: Progressing Towards Goal  Goal: *Oxygen saturation within defined limits  Outcome: Progressing Towards Goal  Goal: *Hemodynamically stable  Outcome: Progressing Towards Goal  Goal: *Demonstrates progressive activity  Outcome: Progressing Towards Goal  Goal: *Tolerating diet  Outcome: Progressing Towards Goal  Goal: *Optimal pain control at patient's stated goal  Outcome: Progressing Towards Goal     Problem: Pneumonia: Day 3  Goal: Off Pathway (Use only if patient is Off Pathway)  Outcome: Progressing Towards Goal  Goal: Activity/Safety  Outcome: Progressing Towards Goal  Goal: Consults, if ordered  Outcome: Progressing Towards Goal  Goal: Diagnostic Test/Procedures  Outcome: Progressing Towards Goal  Goal: Nutrition/Diet  Outcome: Progressing Towards Goal  Goal: Discharge Planning  Outcome: Progressing Towards Goal  Goal: Medications  Outcome: Progressing Towards Goal  Goal: Respiratory  Outcome: Progressing Towards Goal  Goal: Treatments/Interventions/Procedures  Outcome: Progressing Towards Goal  Goal: Psychosocial  Outcome: Progressing Towards Goal  Goal: *Oxygen saturation within defined limits  Outcome: Progressing Towards Goal  Goal: *Hemodynamically stable  Outcome: Progressing Towards Goal  Goal: *Demonstrates progressive activity  Outcome: Progressing Towards Goal  Goal: *Tolerating diet  Outcome: Progressing Towards Goal  Goal: *Describes available resources and support systems  Outcome: Progressing Towards Goal  Goal: *Optimal pain control at patient's stated goal  Outcome: Progressing Towards Goal     Problem: Pneumonia: Day 4  Goal: Off Pathway (Use only if patient is Off Pathway)  Outcome: Progressing Towards Goal  Goal: Activity/Safety  Outcome: Progressing Towards Goal  Goal: Nutrition/Diet  Outcome: Progressing Towards Goal  Goal: Discharge Planning  Outcome: Progressing Towards Goal  Goal: Medications  Outcome: Progressing Towards Goal  Goal: Respiratory  Outcome: Progressing Towards Goal  Goal: Treatments/Interventions/Procedures  Outcome: Progressing Towards Goal  Goal: Psychosocial  Outcome: Progressing Towards Goal     Problem: Pneumonia: Discharge Outcomes  Goal: *Demonstrates progressive activity  Outcome: Progressing Towards Goal  Goal: *Describes follow-up/return visits to physicians  Outcome: Progressing Towards Goal  Goal: *Tolerating diet  Outcome: Progressing Towards Goal  Goal: *Verbalizes name, dosage, time, side effects, and number of days to continue medications  Outcome: Progressing Towards Goal  Goal: *Influenza immunization  Outcome: Progressing Towards Goal  Goal: *Pneumococcal immunization  Outcome: Progressing Towards Goal  Goal: *Respiratory status at baseline  Outcome: Progressing Towards Goal  Goal: *Vital signs within defined limits  Outcome: Progressing Towards Goal  Goal: *Describes available resources and support systems  Outcome: Progressing Towards Goal  Goal: *Optimal pain control at patient's stated goal  Outcome: Progressing Towards Goal

## 2022-03-08 NOTE — CONSULTS
[facility]ist Consult   Admit Date:  3/4/2022  3:47 PM   Name:  Ella Burton   Age:  67 y.o. Sex:  male  :  1945   MRN:  [id number]   Room:      Presenting Complaint: Chest Pain    Reason(s) for Admission: VT (ventricular tachycardia) ([facility] [I47.2]     Reason for consult: Management of pneumonia. History of Presenting Illness:   Ella Burton is a 67 y.o. 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 Medicine service consulted to manage patient's pneumonia. Pneumonia: As per patient he has prolonged hospitalization for Covid and after that he had pneumonia couple of times in the last year. He denies any cough while eating. As per patient he is dealing with cough from last few days which is productive. As far as he remember, sputum is of white color. Denies any fever before this hospitalization. Denies any chills. As per patient he does not use oxygen at home and currently is on 2 L oxygen. Denies any chest pain at present, palpitation, nausea or vomiting. Ventricular tachycardia: Status post ICD. Diabetes mellitus: Takes multiple medications at home and as per patient his blood sugar is controlled at home. Quit smoking long time ago. Social alcohol use. Denies any illicit drug use. Only history positive for hypertension and stroke in patient's mother. Denies any nausea, vomiting, palpitation, constipation or headache. Rest review of system negative except mentioned above. Past medical history, social history, family history, hospital course reviewed.       Assessment & Plan:   Principal Problem:  #pneumonia: Patient history of cough with productive sputum and now fever, cannot rule out pneumonia. 3 showing pulmonary infiltrate. Primary team has already started patient on vancomycin and Zosyn. I have adjusted Zosyn dose based on kidney function. Added doxycycline to cover atypical.  Urine Legionella and streptococcal antigen ordered. Follow-up panculture. Respiratory support. As needed DuoNeb and Standing incentive spirometer ordered. No wheezing noted on examination today so will hold on steroids. #Diabetes mellitus: Holding oral hypoglycemic agent in the hospital.  Sliding scale insulin ordered. Will need adjustment of insulin based on blood sugar. Janet Herzog Rest management per primary team.     Thanks for consultation. We will continue to follow along on daily basis. Please notify on-call medicine physician with questions or if patient clinical condition changes.       Hospital Problems as of 3/8/2022 Date Reviewed: 3/1/2022          Codes Class Noted - Resolved POA    * (Principal) Ventricular tachycardia ([facility] ICD-10-CM: I47.2  ICD-9-CM: 427.1  3/4/2022 - Present Unknown        Atrial fibrillation ([facility] ICD-10-CM: I48.91  ICD-9-CM: 427.31  3/4/2022 - Present Unknown        VT (ventricular tachycardia) (HCC) ICD-10-CM: I47.2  ICD-9-CM: 427.1  3/4/2022 - Present Unknown        Encounter for long-term (current) drug use ICD-10-CM: Z79.899  ICD-9-CM: V58.69  11/10/2015 - Present Unknown        COPD (chronic obstructive pulmonary disease) ([facility] ICD-10-CM: J44.9  ICD-9-CM: 117  4/10/2015 - Present Yes        DM (diabetes mellitus) type II, controlled, with peripheral vascular disorder (HCC) (Chronic) ICD-10-CM: E11.51  ICD-9-CM: 250.70, 443.81  9/11/2014 - Present Yes        Coronary artery disease involving native coronary artery of native heart without angina pectoris ICD-10-CM: I25.10  ICD-9-CM: 414.01  10/12/2011 - Present Yes        HTN (hypertension) (Chronic) ICD-10-CM: I10  ICD-9-CM: 401.9  10/12/2011 - Present Yes        Severe obesity (BMI 35.0-39. 9) with comorbidity ([facility] [address] ICD-10-CM: E66.01  ICD-9-CM: 278.01  10/12/2011 - Present Yes              Past History:  Past Medical History:   Diagnosis Date    Acute respiratory failure with hypoxia ([address] 10/23/2014    AGUSTIN (acute kidney injury) ([address] 9/15/2014    Allergic rhinitis, CAUSE UNSPECIFIED 11/10/2015    Asthma; SEASONAL 11/10/2015    Benign essential hypertension 11/10/2015    Benign neoplasm of colon 11/10/2015    CAD (coronary artery disease) 1998, 1999    mix2, 3 stents [id number]    CAD (coronary artery disease) 11/10/2015    CAP (community acquired pneumonia) 12/31/2020    Cardiomyopathy ([address] 95/04/7440    Complicated wound infection 11/10/2015    COPD /OTHER 11/10/2015    COPD exacerbation ([address] 10/12/2011    COVID-19 12/31/2020    Diabetes mellitus type 2, controlled ([address] 11/10/2015    Diabetic neuropathy  11/10/2015    Disruption of wound, unspecified 10/9/2014    Encounter for long-term (current) use of other high-risk medications 11/10/2015    Extremity atherosclerosis with intermittent claudication ([address] 11/10/2015    H/O endarterectomy; 9/2014 11/10/2015    Hiatal hernia 11/10/2015    Hyperglycemia  11/10/2015    Hyperlipidemia 11/10/2015    Monomorphic ventricular tachycardia ([address] 12/31/2020    Myocardial infarction ([address] 1999    Myocardial infarction ([address] (1999) 11/10/2015    Obesity, UNSPECIFIED 11/10/2015    Orthostatic hypotension 11/10/2015    Osteoarthritis 11/10/2015    Peripheral vascular disease ([address] 2/2014; S/P BILAT FEMORAL 11/10/2015    PNA (pneumonia) 2/8/2019    PVC (premature ventricular contraction)     Rash 09/43/3057    Seroma complicating a procedure 10/2/2014    Sleep apnea     cpap    Uncontrolled type 2 diabetes mellitus ([address] 11/10/2015    Vertiginous syndromes & LABYRINTHINE DISORDERS/UNSPEC 11/10/2015      Past Surgical History:   Procedure Laterality Date    HX CORONARY STENT PLACEMENT  1999    HX CORONARY STENT PLACEMENT 12/05/2018    LCX OM1:2.5 x 12 Yoel MIRELA    HX HEART CATHETERIZATION  12/05/2018    LV EF=40%. LM:nl. LAD:30-40% mid stenosis. LCX OM1:95% stenosis. RCA:100% occlusion at RV branch.  ID ABDOMEN SURGERY PROC UNLISTED  1960    abdominal cyst removed    ID CARDIAC SURG PROCEDURE UNLIST  1999    stents x3    VASCULAR SURGERY PROCEDURE UNLIST  9/11/14    sally femoral endarterectomy    VASCULAR SURGERY PROCEDURE UNLIST  11/5/14    Repair of right common femoral artery       Allergies   Allergen Reactions    Azithromycin Hives    Daypro [Oxaprozin] Other (comments)     ELEVATED BLOOD PRESSURE      Social History     Tobacco Use    Smoking status: Former Smoker     Packs/day: 1.00     Years: 50.00     Pack years: 50.00     Quit date: 10/2/2011     Years since quitting: 10.4    Smokeless tobacco: Current User     Types: Chew   Substance Use Topics    Alcohol use: Yes     Alcohol/week: 0.0 standard drinks     Comment: rare      Family History   Problem Relation Age of Onset   [place] Stroke Mother     Hypertension Mother     Breast Cancer Mother     Heart Attack Father     Heart Disease Father     Other Father         DIVERTICULITIS    Lung Disease Brother         mesothioloma    Diabetes Sister     Crohn's Disease Sister       Family history reviewed and negative except as otherwise noted.     Immunization History   Administered Date(s) Administered    (RETIRED) Pneumococcal Vaccine (Unspecified Type) 10/17/2011    COVID-19, Moderna, Primary or Immunocompromised Series, MRNA, PF, 100mcg/0.5mL 03/15/2021, 04/15/2021    Influenza High Dose Vaccine PF 11/17/2016, 08/11/2017, 09/13/2018, 10/07/2019, 09/30/2020    Influenza Vaccine 09/30/2010, 09/01/2013, 09/26/2014, 11/02/2014, 10/12/2015    Influenza Vaccine Split 10/17/2011    Influenza, Quadrivalent, Adjuvanted (>65 Yrs FLUAD QUAD [id number]) 11/02/2021    Pneumococcal Conjugate (PCV-13) 04/10/2015    Pneumococcal Polysaccharide (PPSV-23) 11/27/2007, 10/17/2011    TB Skin Test (PPD) Intradermal 01/06/2021    Zoster Recombinant 09/06/2018, 11/27/2018     Current Facility-Administered Medications   Medication Dose Route Frequency    [START ON 3/9/2022] vancomycin (VANCOCIN) 1250 mg in  ml infusion  1,250 mg IntraVENous Q24H    doxycycline (VIBRAMYCIN) 100 mg in 0.9% sodium chloride (MBP/ADV) 100 mL MBP  100 mg IntraVENous Q12H    piperacillin-tazobactam (ZOSYN) 3.375 g in 0.9% sodium chloride (MBP/ADV) 100 mL MBP  3.375 g IntraVENous Q8H    sodium chloride (NS) flush 5-40 mL  5-40 mL IntraVENous Q8H    sodium chloride (NS) flush 5-40 mL  5-40 mL IntraVENous PRN    carvediloL (COREG) tablet 12.5 mg  12.5 mg Oral BID WITH MEALS    amiodarone (CORDARONE) tablet 400 mg  400 mg Oral BID    sodium chloride (NS) flush 5-10 mL  5-10 mL IntraVENous Q8H    sodium chloride (NS) flush 5-10 mL  5-10 mL IntraVENous PRN    albuterol (PROVENTIL VENTOLIN) nebulizer solution 2.5 mg  2.5 mg Nebulization Q4H PRN    valsartan (DIOVAN) tablet 320 mg  320 mg Oral DAILY    aspirin chewable tablet 81 mg  81 mg Oral QHS    cilostazoL (PLETAL) tablet 50 mg  50 mg Oral BID    clopidogreL (PLAVIX) tablet 75 mg  75 mg Oral DAILY    budesonide-formoterol (SYMBICORT) 80-4.5 mcg inhaler  2 Puff Inhalation BID RT    fluticasone propionate (FLONASE) 50 mcg/actuation nasal spray 2 Spray  2 Spray Both Nostrils DAILY    [Held by provider] glipiZIDE (GLUCOTROL) tablet 10 mg  10 mg Oral BID    insulin lispro (HUMALOG) injection   SubCUTAneous AC&HS    [Held by provider] alogliptin (NESINA) tablet 12.5 mg  12.5 mg Oral DAILY    magnesium oxide (MAG-OX) tablet 400 mg  400 mg Oral DAILY    montelukast (SINGULAIR) tablet 10 mg  10 mg Oral QHS    polyethylene glycol (MIRALAX) packet 17 g  17 g Oral DAILY    rosuvastatin (CRESTOR) tablet 10 mg  10 mg Oral DAILY    sodium chloride (NS) flush 5-40 mL  5-40 mL IntraVENous PRN    nitroglycerin (NITROSTAT) tablet 0.4 mg  0.4 mg SubLINGual Q5MIN PRN    morphine injection 2 mg  2 mg IntraVENous Q4H PRN    acetaminophen (TYLENOL) tablet 650 mg  650 mg Oral Q4H PRN    HYDROcodone-acetaminophen (NORCO) 7.5-325 mg per tablet 1 Tablet  1 Tablet Oral Q4H PRN    promethazine (PHENERGAN) tablet 25 mg  25 mg Oral Q6H PRN       Objective:     Patient Vitals for the past 24 hrs:   Temp Pulse Resp BP SpO2   03/08/22 1116 97.6 °F (36.4 °C) 72 18 (!) 102/58 96 %   03/08/22 0855 -- -- -- -- (P) 94 %   03/08/22 0845 -- -- -- -- 94 %   03/08/22 0733 98.1 °F (36.7 °C) 72 16 121/67 93 %   03/08/22 0401 98.5 °F (36.9 °C) 70 18 115/60 95 %   03/07/22 2319 -- 94 -- 105/64 93 %   03/07/22 2305 (!) 100.6 °F (38.1 °C) 86 18 (!) 94/53 94 %   03/07/22 2019 -- -- -- -- 90 %   03/07/22 2017 (!) 102.1 °F (38.9 °C) -- -- -- --   03/07/22 2000 -- 97 -- -- --   03/07/22 1900 98.9 °F (37.2 °C) 97 20 (!) 169/87 94 %   03/07/22 1813 97.9 °F (36.6 °C) 88 19 120/70 91 %   03/07/22 1800 -- 65 -- 116/62 --   03/07/22 1745 -- 66 -- 110/71 94 %   03/07/22 1730 -- 64 -- 112/68 93 %   03/07/22 1715 -- 63 -- 103/65 94 %   03/07/22 1700 -- 67 -- 102/63 93 %   03/07/22 1645 -- 70 -- 103/71 92 %   03/07/22 1630 -- 68 -- 103/71 94 %   03/07/22 1615 -- 72 -- 102/68 90 %   03/07/22 1600 -- 71 -- 99/64 91 %   03/07/22 1545 98.6 °F (37 °C) 65 14 (!) 151/90 100 %     Oxygen Therapy  O2 Sat (%): 96 % (03/08/22 1116)  Pulse via Oximetry: (P) 82 beats per minute (03/08/22 0855)  O2 Device: Nasal cannula (03/08/22 1116)  Skin Assessment: Clean, dry, & intact (03/08/22 1116)  O2 Flow Rate (L/min): 4 l/min (03/08/22 1116)  FIO2 (%): 21 % (03/05/22 2143)    Estimated body mass index is 37.23 kg/m² as calculated from the following:    Height as of this encounter: 6' 1\" (1.854 m). Weight as of this encounter: 128 kg (282 lb 3 oz).     Intake/Output Summary (Last 24 hours) at 3/8/2022 1349  Last data filed at 3/8/2022 1121  Gross per 24 hour   Intake 320 ml   Output 505 ml   Net -185 ml         Physical Exam:    Blood pressure (!) 102/58, pulse 72, temperature 97.6 °F (36.4 °C), resp. rate 18, height 6' 1\" (1.854 m), weight 128 kg (282 lb 3 oz), SpO2 96 %. General:    Well nourished. No overt distress  Head:  Normocephalic, atraumatic  Eyes:  Sclerae appear normal.  Pupils equally round. ENT:    Moist oral mucosa  Neck:  Supple  CV:   RRR. No m/r/g. Lungs:   Bilateral lower lobe crackles otherwise CTAB. No wheezing  Respirations even, unlabored  Abdomen: Bowel sounds present. Soft, nontender, nondistended. Extremities: No cyanosis or clubbing. No edema  Skin:     No rashes and normal coloration. Warm and dry. Neuro:  AOx3, moving all 4 extremities, speech normal  Psych:  Normal mood and affect.       I have reviewed ordered lab tests and independently visualized imaging below:    Recent Labs:  Recent Results (from the past 48 hour(s))   HEPARIN XA UFH    Collection Time: 03/06/22  2:41 PM   Result Value Ref Range    Heparin Xa UFH 0.18 (L) 0.3 - 0.7 IU/mL   GLUCOSE, POC    Collection Time: 03/06/22  3:56 PM   Result Value Ref Range    Glucose (POC) 131 (H) 65 - 100 mg/dL    Performed by Talon    GLUCOSE, POC    Collection Time: 03/06/22  8:49 PM   Result Value Ref Range    Glucose (POC) 110 (H) 65 - 100 mg/dL    Performed by Brando    HEPARIN XA UFH    Collection Time: 03/06/22 10:59 PM   Result Value Ref Range    Heparin Xa UFH 0.61 0.3 - 0.7 IU/mL   METABOLIC PANEL, BASIC    Collection Time: 03/07/22  5:02 AM   Result Value Ref Range    Sodium 140 136 - 145 mmol/L    Potassium 4.0 3.5 - 5.1 mmol/L    Chloride 107 98 - 107 mmol/L    CO2 27 21 - 32 mmol/L    Anion gap 6 (L) 7 - 16 mmol/L    Glucose 126 (H) 65 - 100 mg/dL    BUN 15 8 - 23 MG/DL    Creatinine 1.20 0.8 - 1.5 MG/DL    GFR est AA >60 >60 ml/min/1.73m2    GFR est non-AA >60 >60 ml/min/1.73m2    Calcium 8.8 8.3 - 10.4 MG/DL   MAGNESIUM    Collection Time: 03/07/22  5:02 AM   Result Value Ref Range    Magnesium 2. 4 1.8 - 2.4 mg/dL   HEPARIN XA UFH    Collection Time: 03/07/22  5:18 AM   Result Value Ref Range    Heparin Xa UFH 0.58 0.3 - 0.7 IU/mL   GLUCOSE, POC    Collection Time: 03/07/22  6:37 AM   Result Value Ref Range    Glucose (POC) 150 (H) 65 - 100 mg/dL    Performed by Brando    GLUCOSE, POC    Collection Time: 03/07/22 11:09 AM   Result Value Ref Range    Glucose (POC) 150 (H) 65 - 100 mg/dL    Performed by Jeet    GLUCOSE, POC    Collection Time: 03/07/22  6:18 PM   Result Value Ref Range    Glucose (POC) 121 (H) 65 - 100 mg/dL    Performed by [address], POC    Collection Time: 03/07/22  8:37 PM   Result Value Ref Range    Glucose (POC) 210 (H) 65 - 100 mg/dL    Performed by Adiel. URINALYSIS W/ RFLX MICROSCOPIC    Collection Time: 03/07/22  8:59 PM   Result Value Ref Range    Color YELLOW      Appearance CLEAR      Specific gravity 1.019 1.001 - 1.023      pH (UA) 5.5 5.0 - 9.0      Protein 30 (A) NEG mg/dL    Glucose 250 mg/dL    Ketone Negative NEG mg/dL    Bilirubin Negative NEG      Blood Negative NEG      Urobilinogen 0.2 0.2 - 1.0 EU/dL    Nitrites Negative NEG      Leukocyte Esterase Negative NEG      WBC 0-3 0 /hpf    RBC 0-3 0 /hpf    Epithelial cells 0 0 /hpf    Bacteria 0 0 /hpf    Casts 0-3 0 /lpf   CULTURE, URINE    Collection Time: 03/07/22  8:59 PM    Specimen: Clean catch; Urine    URINE   Result Value Ref Range    Special Requests: NO SPECIAL REQUESTS      Culture result:        NO GROWTH AFTER SHORT PERIOD OF INCUBATION. FURTHER RESULTS TO FOLLOW AFTER OVERNIGHT INCUBATION.    RESPIRATORY VIRUS PANEL W/COVID-19, PCR    Collection Time: 03/07/22  9:28 PM    Specimen: Nasopharyngeal   Result Value Ref Range    Adenovirus NOT DETECTED NOTDET      Coronavirus 229E NOT DETECTED NOTDET      Coronavirus HKU1 NOT DETECTED NOTDET      Coronavirus CVNL63 NOT DETECTED NOTDET      Coronavirus OC43 NOT DETECTED NOTDET      SARS-CoV-2, PCR NOT DETECTED NOTDET Metapneumovirus NOT DETECTED NOTDET      Rhinovirus and Enterovirus NOT DETECTED NOTDET      Influenza A NOT DETECTED NOTDET      Influenza B NOT DETECTED NOTDET      Parainfluenza 1 NOT DETECTED NOTDET      Parainfluenza 2 NOT DETECTED NOTDET      Parainfluenza 3 NOT DETECTED NOTDET      Parainfluenza virus 4 NOT DETECTED NOTDET      RSV by PCR NOT DETECTED NOTDET      B. parapertussis, PCR NOT DETECTED NOTDET      Bordetella pertussis - PCR NOT DETECTED NOTDET      Chlamydophila pneumoniae DNA, QL, PCR NOT DETECTED NOTDET      Mycoplasma pneumoniae DNA, QL, PCR NOT DETECTED NOTDET     CBC W/O DIFF    Collection Time: 03/07/22 11:19 PM   Result Value Ref Range    WBC 11.8 (H) 4.3 - 11.1 K/uL    RBC 3.60 (L) 4.23 - 5.6 M/uL    HGB 11.7 (L) 13.6 - 17.2 g/dL    HCT 35.1 (L) 41.1 - 50.3 %    MCV 97.5 79.6 - 97.8 FL    MCH 32.5 26.1 - 32.9 PG    MCHC 33.3 31.4 - 35.0 g/dL    RDW 15.1 (H) 11.9 - 14.6 %    PLATELET 707 601 - 895 K/uL    MPV 10.8 9.4 - 12.3 FL    ABSOLUTE NRBC 0.00 0.0 - 0.2 K/uL   METABOLIC PANEL, BASIC    Collection Time: 03/07/22 11:19 PM   Result Value Ref Range    Sodium 141 136 - 145 mmol/L    Potassium 4.3 3.5 - 5.1 mmol/L    Chloride 109 (H) 98 - 107 mmol/L    CO2 27 21 - 32 mmol/L    Anion gap 5 (L) 7 - 16 mmol/L    Glucose 173 (H) 65 - 100 mg/dL    BUN 18 8 - 23 MG/DL    Creatinine 1.50 0.8 - 1.5 MG/DL    GFR est AA 58 (L) >60 ml/min/1.73m2    GFR est non-AA 48 (L) >60 ml/min/1.73m2    Calcium 8.6 8.3 - 10.4 MG/DL   MAGNESIUM    Collection Time: 03/07/22 11:19 PM   Result Value Ref Range    Magnesium 2.0 1.8 - 2.4 mg/dL   METABOLIC PANEL, BASIC    Collection Time: 03/08/22  3:55 AM   Result Value Ref Range    Sodium 138 136 - 145 mmol/L    Potassium 4.3 3.5 - 5.1 mmol/L    Chloride 105 98 - 107 mmol/L    CO2 27 21 - 32 mmol/L    Anion gap 6 (L) 7 - 16 mmol/L    Glucose 121 (H) 65 - 100 mg/dL    BUN 21 8 - 23 MG/DL    Creatinine 1.70 (H) 0.8 - 1.5 MG/DL    GFR est AA 51 (L) >60 ml/min/1.73m2 GFR est non-AA 42 (L) >60 ml/min/1.73m2    Calcium 8.7 8.3 - 10.4 MG/DL   MAGNESIUM    Collection Time: 03/08/22  3:55 AM   Result Value Ref Range    Magnesium 2.2 1.8 - 2.4 mg/dL   LACTIC ACID    Collection Time: 03/08/22  3:55 AM   Result Value Ref Range    Lactic acid 1.1 0.4 - 2.0 MMOL/L   PROCALCITONIN    Collection Time: 03/08/22  3:55 AM   Result Value Ref Range    Procalcitonin 1.11 (H) 0.00 - 0.49 ng/mL   GLUCOSE, POC    Collection Time: 03/08/22  6:23 AM   Result Value Ref Range    Glucose (POC) 123 (H) 65 - 100 mg/dL    Performed by Tessa    MSSA/MRSA SC BY PCR, NASAL SWAB    Collection Time: 03/08/22  6:27 AM    Specimen: Nasal swab   Result Value Ref Range    Special Requests: NO SPECIAL REQUESTS      Culture result:        SA target not detected. A MRSA NEGATIVE, SA NEGATIVE test result does not preclude MRSA or SA nasal colonization. CBC WITH AUTOMATED DIFF    Collection Time: 03/08/22 10:42 AM   Result Value Ref Range    WBC 14.4 (H) 4.3 - 11.1 K/uL    RBC 3.48 (L) 4.23 - 5.6 M/uL    HGB 11.4 (L) 13.6 - 17.2 g/dL    HCT 34.0 (L) 41.1 - 50.3 %    MCV 97.7 79.6 - 97.8 FL    MCH 32.8 26.1 - 32.9 PG    MCHC 33.5 31.4 - 35.0 g/dL    RDW 15.2 (H) 11.9 - 14.6 %    PLATELET 512 297 - 329 K/uL    MPV 10.6 9.4 - 12.3 FL    ABSOLUTE NRBC 0.00 0.0 - 0.2 K/uL    DF AUTOMATED      NEUTROPHILS 83 (H) 43 - 78 %    LYMPHOCYTES 6 (L) 13 - 44 %    MONOCYTES 10 4.0 - 12.0 %    EOSINOPHILS 0 (L) 0.5 - 7.8 %    BASOPHILS 0 0.0 - 2.0 %    IMMATURE GRANULOCYTES 1 0.0 - 5.0 %    ABS. NEUTROPHILS 11.9 (H) 1.7 - 8.2 K/UL    ABS. LYMPHOCYTES 0.8 0.5 - 4.6 K/UL    ABS. MONOCYTES 1.4 (H) 0.1 - 1.3 K/UL    ABS. EOSINOPHILS 0.0 0.0 - 0.8 K/UL    ABS. BASOPHILS 0.0 0.0 - 0.2 K/UL    ABS. IMM.  GRANS. 0.1 0.0 - 0.5 K/UL   GLUCOSE, POC    Collection Time: 03/08/22 11:17 AM   Result Value Ref Range    Glucose (POC) 155 (H) 65 - 100 mg/dL    Performed by Bridger Mcmahan        All Micro Results     Procedure Component Value Units Date/Time    LUPE Sanford, UR/CSF [[id number]] Collected: 03/08/22 1117    Order Status: Completed Updated: 03/08/22 1139    LEGIONELLA PNEUMOPHILA Leena Carrero [[id number]] Collected: 03/08/22 1117    Order Status: Completed Specimen: Urine Updated: 03/08/22 1139    CULTURE, URINE [[id number]] Collected: 03/07/22 2059    Order Status: Completed Specimen: Urine from Clean catch Updated: 03/08/22 0836     Special Requests: NO SPECIAL REQUESTS        Culture result:       NO GROWTH AFTER SHORT PERIOD OF INCUBATION. FURTHER RESULTS TO FOLLOW AFTER OVERNIGHT INCUBATION. MSSA/MRSA SC BY PCR, NASAL SWAB [[id number]] Collected: 03/08/22 7765    Order Status: Completed Specimen: Nasal swab Updated: 03/08/22 0809     Special Requests: NO SPECIAL REQUESTS        Culture result:       SA target not detected. A MRSA NEGATIVE, SA NEGATIVE test result does not preclude MRSA or SA nasal colonization.           CULTURE, BLOOD [[id number]] Collected: 03/08/22 0000    Order Status: Completed Specimen: Blood Updated: 03/08/22 0123    CULTURE, BLOOD [[id number]] Collected: 03/08/22 0007    Order Status: Completed Specimen: Blood Updated: 03/08/22 0123    RESPIRATORY VIRUS PANEL W/COVID-19, PCR [[id number]] Collected: 03/07/22 2128    Order Status: Completed Specimen: Nasopharyngeal Updated: 03/07/22 2317     Adenovirus NOT DETECTED        Coronavirus 229E NOT DETECTED        Coronavirus HKU1 NOT DETECTED        Coronavirus CVNL63 NOT DETECTED        Coronavirus OC43 NOT DETECTED        SARS-CoV-2, PCR NOT DETECTED        Metapneumovirus NOT DETECTED        Rhinovirus and Enterovirus NOT DETECTED        Influenza A NOT DETECTED        Influenza B NOT DETECTED        Parainfluenza 1 NOT DETECTED        Parainfluenza 2 NOT DETECTED        Parainfluenza 3 NOT DETECTED        Parainfluenza virus 4 NOT DETECTED        RSV by PCR NOT DETECTED        B. parapertussis, PCR NOT DETECTED        Bordetella pertussis - PCR NOT DETECTED        Chlamydophila pneumoniae DNA, QL, PCR NOT DETECTED        Mycoplasma pneumoniae DNA, QL, PCR NOT DETECTED             Other Studies:  XR CHEST SNGL V    Result Date: 3/7/2022  EXAMINATION: One view chest HISTORY: Fever TECHNIQUE: Frontal chest. COMPARISON: 3/4/2022 FINDINGS:  Stable left-sided cardiac device. Interval development of airspace disease in the right upper lobe and left mid and lower lung suggesting pneumonia or pulmonary edema. Please correlate clinically. There is no significant pneumothorax. There is no pleural effusion. The heart is unchanged. No other significant interval changes. 1. Findings as described above. XR ABD (KUB)    Result Date: 3/7/2022  EXAMINATION: Abdomen. HISTORY: Abdominal pain. TECHNIQUE: Single frontal view of the abdomen. COMPARISON: None available. FINDINGS: There is a nonspecific, nonobstructed bowel gas pattern. No definitive evidence of free air. There is a moderate amount of retained fecal material in the distal colon and rectum. Osseous structures and soft tissues appear within normal limits. Nonspecific, nonobstructed bowel gas pattern. ELECTROPHYSIOLOGY PROCEDURE    Result Date: 3/7/2022  : Korin Jerome. Hans Fontaine MD   REFERRING: Liam Collier MD   Pre-Procedure Diagnosis 1. Chronic systolic heart failure, ejection fraction of 30-35% 2. Ischemic dilated cardiomyopathy. 3. Class II heart failure symptoms. 4. Chronic systolic heart failure for a minimum of 3 months duration on optimal medical therapy. 5. Secondary prevention indications for defibrillator 6. Life expectancy greater than 1 year. 7. Ventricular tachycardia   Procedure Performed 1.  Insertion of an implantable cardioverter defibrillator.   Complications: None   Contrast: 15 cc  Fluoroscopy Time: 2.6 minutes   Anesthesia: MAC   Estimated Blood Loss: Less than 10 mL       Specimens: * No specimens in log *   Patient Information and Indications: The procedure, indications, risks, benefits, and alternatives were discussed with the patient and family members, who desired to proceed after questions were answered and informed consent was documented.   Procedure: After informed consent was obtained, the patient was brought to the Electrophysiology Laboratory in a fasting state and was prepped and draped in sterile fashion. Prophylactic antibiotic was administered 10 minutes prior to skin incision: refer to anesthesia procedural documentation for full details. Conscious sedation was administered by anesthesia with continuous oxygen saturation measurement and blood pressure measurement. A left upper extremity venogram demonstrated a patient left axillary and subclavian vein without obvious obstruction. Local anesthetic (sensorcaine) was delivered to the left pectoral region. An incision was made parallel to the deltopectoral groove. A subcutaneous pocket was created using blunt dissection and electrocautery, and adequate hemostasis was established. Access x 2 was obtained under fluoroscopic/ultrasound guidance using a modified Seldinger technique with placement of 2 wires down into the RA and advanced below the diaphragm. Next, placement of an 8 Fr peel-away sheath over the first guidewire was performed. A permanent RV single coil ICD lead was then advanced under fluoroscopic guidance via the 8 Fr sheath into the RV in a stable position with satisfactory sensing and pacing characteristics. The peel away sheath was removed. A 6 Fr Safe-sheath was then placed in the second guidewire. A permanent pacing lead was advanced under fluoroscopic guidance and positioned in the right atrium. Stable RA appendage position with satisfactory sensing and pacing characteristics was obtained. The sheath was peeled. The leads were sutured to the underlying pectoralis fascia using 2-0 Ethibond.  The lead slack and position was assessed under fluoroscopy while securing the lead collars to ensure proper length. Final lead testing via the pacing system analyzer (PSA) demonstrated stable sensing, impedance and pacing thresholds. The lead pins were then cleaned with antibiotic soaked gauze, dried gently, and attached to a new ICD generator. Pins were directly observed to pass the tip electrode, and the ring hex wrench screws were secured, and leads tug tested. The device and leads were gently positioned within the pocket and a retention suture was placed after the pocket was irrigated copiously with a saline antimicrobial solution. The wound was closed with 2 layers of 3-0 monocryl (Stratafix) suture followed by skin closure with 4-0 V-Loc. Exofin solution was placed over the wound, allowed to dry, and then a sterile Aquacel dressing was placed over the wound. Fluoroscopic images were interpreted by me, in multiple projections.   DFT testing was not performed.   The patient tolerated the procedure and there were no complications.  The patient was allowed to awake from anesthesia and transferred to the recovery area in stable condition.   Device and Lead Information Pulse Generator Model #  Serial # Location Implant/Explant [id number] [facility] [id number] Left Pectoral Implant [id number]   Lead Model Number  Serial Number Lead position Implant/Explant RA [facility] [place] [id number] RA Appendage Implant 03.07.2022 RV 0673 [facility] [place] [id number] RV [place] Implant 03.07.2022   Lead Sensitivity and Threshold Lead R or P sensitivity (mv) Threshold (V) Threshold PW (msec) Impedance (ohms) Final output Voltage (V) Final PW (msec) RA 1.8 1.8 0.4 530 3.5 0.4 RV 16.5 0.4 0.4 589 3.5 0.4   Bradycardia Settings Regino Mode LRL URL Pace AVD (ms) Sense AVD (ms) Rate Response Mode Switching Mode SW Rate DDD 60 120 350 350 Off On 150   Tachycardia Settings Zone Type VT1 VT2 VF ON/OFF/ MONITOR ON ON ON Zone Rate 140 170 220 1st Therapy Type ATP ATP ATP Energy (J) X4 X4 X1 2nd Therapy Type   Shock Shock Energy (J)   31J 41J 3rd Therapy Type   Shock Shock Energy (J)   41J 41J 4th Therapy Type   Shock Shock Energy (J)   41J 41J 5th Therapy Type   Shock Shock Energy (J)   41J 41J 6th Therapy Type   Shock Shock Energy (J)   41J x2 41J x4   Defibrillation Threshold Testing DFT# How induced Successful test? Shock Imped (ohms) Energy (J) Charge time (sec) Rescue needed? Defib threshold (J) 1 Did not test   81                : Successful Dual Chamber ICD implant. MRI conditional.   PLAN: Ongoing admission. -Start Eliqius tomorrow if stable pocket. -ASA indefinitely. -Routine CIED instructions. -Device clinic follow up in 1-2 weeks.     Nathan Patel. Jannie Henriquez MD, [address] Clinical Cardiac Electrophysiology [facility] Cardiology   3/7/2022 3:38 PM         Signed:  Lo Acuña MD    Part of this note may have been written by using a voice dictation software. The note has been proof read but may still contain some grammatical/other typographical errors.

## 2022-03-08 NOTE — PROGRESS NOTES
[facility] CARDIOLOGY PROGRESS NOTE           3/8/2022 7:46 AM    Admit Date: 3/4/2022    Admit Diagnosis: VT (ventricular tachycardia) ([facility] [address] [I47.2]    Assessment:   Principal Problem:    Ventricular tachycardia ([address] (3/4/2022)      HCAP     Active Problems:    Coronary artery disease involving native coronary artery of native heart without angina pectoris (10/12/2011)      HTN (hypertension) (10/12/2011)      Severe obesity (BMI 35.0-39. 9) with comorbidity ([address] (10/12/2011)      DM (diabetes mellitus) type II, controlled, with peripheral vascular disorder ([address] (9/11/2014)      COPD (chronic obstructive pulmonary disease) ([address] (4/10/2015)      Atrial fibrillation ([address] (3/4/2022)      VT (ventricular tachycardia) ([address] (3/4/2022)        Plan:   1. VT - s/p DC ICD. Stable device interrogation, lead position stable on CXR, stable wound. 2. PNA - new finding, on IV abx, appreciate hospitalist consult. 3. CAD - continue OMT. 4. AF - in NSR, will need to restart Eliquis ASAP. 5. Dispo - pending infectious workup. Patient is being seen today within a 90-day global period, but is being seen for a separately identifiable E/M service and is not related to the post-operative care of the procedure. Thank you for allowing me to participate in the electrophysiologic care of this most pleasant patient. Please feel free to contact me if there are any questions or concerns. Lavenia Laughter. Maureen Menard MD, MS  Clinical Cardiac Electrophysiology  [facility] Cardiology    Subjective:   No complaints this AM, no chest pain or shortness of breath. +Fever overnight and appears to have a PNA now on IV abx.     Interval History: (History of pertinent interval events obtained from nursing staff)    ROS:  GEN:  No fever or chills  Cardiovascular:  As noted above  Pulmonary:  As noted above  Neuro:  No new focal motor or sensory loss      Objective:     Vitals:    03/07/22 2305 03/07/22 2319 03/08/22 0401 03/08/22 0733   BP: (!) 94/53 105/64 115/60 121/67   Pulse: 86 94 70 72   Resp: 18  18 16   Temp: (!) 100.6 °F (38.1 °C)  98.5 °F (36.9 °C) 98.1 °F (36.7 °C)   SpO2: 94% 93% 95% 93%   Weight:   282 lb 3 oz (128 kg)    Height:           Physical Exam:  General-Well Developed, Well Nourished, No Acute Distress, Alert & Oriented x 3, appropriate mood. Obese. Neck- supple, no JVD  CV- regular rate and rhythm no MRG, left sided CIED C/D/I   Lung- Adventitious sounds lower lung fields. Abd- soft, nontender, nondistended  Ext- no edema bilaterally.   Skin- warm and dry    Current Facility-Administered Medications   Medication Dose Route Frequency    piperacillin-tazobactam (ZOSYN) 3.375 g in 0.9% sodium chloride (MBP/ADV) 100 mL MBP  3.375 g IntraVENous Q8H    [START ON 3/9/2022] vancomycin (VANCOCIN) 1250 mg in  ml infusion  1,250 mg IntraVENous Q24H    sodium chloride (NS) flush 5-40 mL  5-40 mL IntraVENous Q8H    sodium chloride (NS) flush 5-40 mL  5-40 mL IntraVENous PRN    carvediloL (COREG) tablet 12.5 mg  12.5 mg Oral BID WITH MEALS    amiodarone (CORDARONE) tablet 400 mg  400 mg Oral BID    sodium chloride (NS) flush 5-10 mL  5-10 mL IntraVENous Q8H    sodium chloride (NS) flush 5-10 mL  5-10 mL IntraVENous PRN    albuterol (PROVENTIL VENTOLIN) nebulizer solution 2.5 mg  2.5 mg Nebulization Q4H PRN    valsartan (DIOVAN) tablet 320 mg  320 mg Oral DAILY    aspirin chewable tablet 81 mg  81 mg Oral QHS    cilostazoL (PLETAL) tablet 50 mg  50 mg Oral BID    clopidogreL (PLAVIX) tablet 75 mg  75 mg Oral DAILY    budesonide-formoterol (SYMBICORT) 80-4.5 mcg inhaler  2 Puff Inhalation BID RT    fluticasone propionate (FLONASE) 50 mcg/actuation nasal spray 2 Spray  2 Spray Both Nostrils DAILY    glipiZIDE (GLUCOTROL) tablet 10 mg  10 mg Oral BID    insulin lispro (HUMALOG) injection   SubCUTAneous AC&HS    alogliptin (NESINA) tablet 12.5 mg  12.5 mg Oral DAILY    magnesium oxide (MAG-OX) tablet 400 mg  400 mg Oral DAILY    montelukast (SINGULAIR) tablet 10 mg  10 mg Oral QHS    polyethylene glycol (MIRALAX) packet 17 g  17 g Oral DAILY    rosuvastatin (CRESTOR) tablet 10 mg  10 mg Oral DAILY    sodium chloride (NS) flush 5-40 mL  5-40 mL IntraVENous PRN    nitroglycerin (NITROSTAT) tablet 0.4 mg  0.4 mg SubLINGual Q5MIN PRN    morphine injection 2 mg  2 mg IntraVENous Q4H PRN    acetaminophen (TYLENOL) tablet 650 mg  650 mg Oral Q4H PRN    HYDROcodone-acetaminophen (NORCO) 7.5-325 mg per tablet 1 Tablet  1 Tablet Oral Q4H PRN    promethazine (PHENERGAN) tablet 25 mg  25 mg Oral Q6H PRN       Data Review:   Recent Results (from the past 24 hour(s))   GLUCOSE, POC    Collection Time: 03/07/22 11:09 AM   Result Value Ref Range    Glucose (POC) 150 (H) 65 - 100 mg/dL    Performed by Jeet    GLUCOSE, POC    Collection Time: 03/07/22  6:18 PM   Result Value Ref Range    Glucose (POC) 121 (H) 65 - 100 mg/dL    Performed by [address], POC    Collection Time: 03/07/22  8:37 PM   Result Value Ref Range    Glucose (POC) 210 (H) 65 - 100 mg/dL    Performed by Adiel.     URINALYSIS W/ RFLX MICROSCOPIC    Collection Time: 03/07/22  8:59 PM   Result Value Ref Range    Color YELLOW      Appearance CLEAR      Specific gravity 1.019 1.001 - 1.023      pH (UA) 5.5 5.0 - 9.0      Protein 30 (A) NEG mg/dL    Glucose 250 mg/dL    Ketone Negative NEG mg/dL    Bilirubin Negative NEG      Blood Negative NEG      Urobilinogen 0.2 0.2 - 1.0 EU/dL    Nitrites Negative NEG      Leukocyte Esterase Negative NEG      WBC 0-3 0 /hpf    RBC 0-3 0 /hpf    Epithelial cells 0 0 /hpf    Bacteria 0 0 /hpf    Casts 0-3 0 /lpf   RESPIRATORY VIRUS PANEL W/COVID-19, PCR    Collection Time: 03/07/22  9:28 PM    Specimen: Nasopharyngeal   Result Value Ref Range    Adenovirus NOT DETECTED NOTDET      Coronavirus 229E NOT DETECTED NOTDET      Coronavirus HKU1 NOT DETECTED NOTDET      Coronavirus CVNL63 NOT DETECTED NOTDET      Coronavirus OC43 NOT DETECTED NOTDET      SARS-CoV-2, PCR NOT DETECTED NOTDET      Metapneumovirus NOT DETECTED NOTDET      Rhinovirus and Enterovirus NOT DETECTED NOTDET      Influenza A NOT DETECTED NOTDET      Influenza B NOT DETECTED NOTDET      Parainfluenza 1 NOT DETECTED NOTDET      Parainfluenza 2 NOT DETECTED NOTDET      Parainfluenza 3 NOT DETECTED NOTDET      Parainfluenza virus 4 NOT DETECTED NOTDET      RSV by PCR NOT DETECTED NOTDET      B. parapertussis, PCR NOT DETECTED NOTDET      Bordetella pertussis - PCR NOT DETECTED NOTDET      Chlamydophila pneumoniae DNA, QL, PCR NOT DETECTED NOTDET      Mycoplasma pneumoniae DNA, QL, PCR NOT DETECTED NOTDET     CBC W/O DIFF    Collection Time: 03/07/22 11:19 PM   Result Value Ref Range    WBC 11.8 (H) 4.3 - 11.1 K/uL    RBC 3.60 (L) 4.23 - 5.6 M/uL    HGB 11.7 (L) 13.6 - 17.2 g/dL    HCT 35.1 (L) 41.1 - 50.3 %    MCV 97.5 79.6 - 97.8 FL    MCH 32.5 26.1 - 32.9 PG    MCHC 33.3 31.4 - 35.0 g/dL    RDW 15.1 (H) 11.9 - 14.6 %    PLATELET 711 956 - 759 K/uL    MPV 10.8 9.4 - 12.3 FL    ABSOLUTE NRBC 0.00 0.0 - 0.2 K/uL   METABOLIC PANEL, BASIC    Collection Time: 03/07/22 11:19 PM   Result Value Ref Range    Sodium 141 136 - 145 mmol/L    Potassium 4.3 3.5 - 5.1 mmol/L    Chloride 109 (H) 98 - 107 mmol/L    CO2 27 21 - 32 mmol/L    Anion gap 5 (L) 7 - 16 mmol/L    Glucose 173 (H) 65 - 100 mg/dL    BUN 18 8 - 23 MG/DL    Creatinine 1.50 0.8 - 1.5 MG/DL    GFR est AA 58 (L) >60 ml/min/1.73m2    GFR est non-AA 48 (L) >60 ml/min/1.73m2    Calcium 8.6 8.3 - 10.4 MG/DL   MAGNESIUM    Collection Time: 03/07/22 11:19 PM   Result Value Ref Range    Magnesium 2.0 1.8 - 2.4 mg/dL   METABOLIC PANEL, BASIC    Collection Time: 03/08/22  3:55 AM   Result Value Ref Range    Sodium 138 136 - 145 mmol/L    Potassium 4.3 3.5 - 5.1 mmol/L    Chloride 105 98 - 107 mmol/L    CO2 27 21 - 32 mmol/L    Anion gap 6 (L) 7 - 16 mmol/L    Glucose 121 (H) 65 - 100 mg/dL    BUN 21 8 - 23 MG/DL    Creatinine 1.70 (H) 0.8 - 1.5 MG/DL    GFR est AA 51 (L) >60 ml/min/1.73m2    GFR est non-AA 42 (L) >60 ml/min/1.73m2    Calcium 8.7 8.3 - 10.4 MG/DL   MAGNESIUM    Collection Time: 03/08/22  3:55 AM   Result Value Ref Range    Magnesium 2.2 1.8 - 2.4 mg/dL   LACTIC ACID    Collection Time: 03/08/22  3:55 AM   Result Value Ref Range    Lactic acid 1.1 0.4 - 2.0 MMOL/L   PROCALCITONIN    Collection Time: 03/08/22  3:55 AM   Result Value Ref Range    Procalcitonin 1.11 (H) 0.00 - 0.49 ng/mL   GLUCOSE, POC    Collection Time: 03/08/22  6:23 AM   Result Value Ref Range    Glucose (POC) 123 (H) 65 - 100 mg/dL    Performed by Tessa        EKG:  (EKG has been independently visualized by me with interpretation below): NSR, FDAVB, PVCs.

## 2022-03-08 NOTE — PROGRESS NOTES
Bedside and Verbal shift change report given to Mario Phillips RN (oncoming nurse) by Self (offgoing nurse). Report included the following information SBAR, Kardex and MAR.

## 2022-03-08 NOTE — PROGRESS NOTES
Bedside and Verbal shift change report given to self (oncoming nurse) by Cecily Lombard (offgoing nurse). Report included the following information SBAR, Kardex and MAR.

## 2022-03-08 NOTE — PROGRESS NOTES
On acceptance of patient, he was found to be rigorous. Oral temp was taken and found to be 102.1 F. MD Chante Mccauley was notified of pt findings and orders were placed.

## 2022-03-08 NOTE — PROGRESS NOTES
[address] [facility] Pharmacokinetic Monitoring Service - Vancomycin     Jennifer Kearns is a 67 y.o. male starting on vancomycin therapy for HAP. Pharmacy consulted by cardiology for monitoring and adjustment. Target Concentration: Goal AUC/LORA 400-600 mg*hr/L    Additional Antimicrobials: Zosyn    Pertinent Laboratory Values: Wt Readings from Last 1 Encounters:   03/07/22 127 kg (280 lb)     Temp Readings from Last 1 Encounters:   03/08/22 98.5 °F (36.9 °C)     No components found for: PROCAL  Recent Labs     03/08/22  0355 03/07/22  2319 03/07/22  0502   BUN 21 18 15   CREA 1.70* 1.50 1.20   WBC  --  11.8*  --    LAC 1.1  --   --      Estimated Creatinine Clearance: 50.8 mL/min (A) (based on SCr of 1.7 mg/dL (H)). MRSA Nasal Swab: not ordered. Order placed by pharmacy. .    Plan:  Dosing recommendations based on Bayesian software  Start vancomycin 2500mg load x1, then 1250mg q 24 hours  Anticipated AUC of 448 and trough concentration of 14.6 at steady state  Renal labs as indicated   Vancomycin concentrations will be ordered as clinically appropriate   Pharmacy will continue to monitor patient and adjust therapy as indicated    Thank you for the consult,  Luciano Cordova, PharmD, BCPS  Clinical Pharmacist

## 2022-03-09 ENCOUNTER — APPOINTMENT (OUTPATIENT)
Dept: CT IMAGING | Age: 77
DRG: 224 | End: 2022-03-09
Attending: INTERNAL MEDICINE
Payer: MEDICARE

## 2022-03-09 LAB
ANION GAP SERPL CALC-SCNC: 5 MMOL/L (ref 7–16)
BUN SERPL-MCNC: 19 MG/DL (ref 8–23)
CALCIUM SERPL-MCNC: 8.7 MG/DL (ref 8.3–10.4)
CHLORIDE SERPL-SCNC: 104 MMOL/L (ref 98–107)
CO2 SERPL-SCNC: 26 MMOL/L (ref 21–32)
CREAT SERPL-MCNC: 1.2 MG/DL (ref 0.8–1.5)
FLUID CULTURE, SPNG2: NORMAL
GLUCOSE BLD STRIP.AUTO-MCNC: 142 MG/DL (ref 65–100)
GLUCOSE BLD STRIP.AUTO-MCNC: 168 MG/DL (ref 65–100)
GLUCOSE BLD STRIP.AUTO-MCNC: 182 MG/DL (ref 65–100)
GLUCOSE BLD STRIP.AUTO-MCNC: 193 MG/DL (ref 65–100)
GLUCOSE SERPL-MCNC: 156 MG/DL (ref 65–100)
L PNEUMO1 AG UR QL IA: NEGATIVE
MAGNESIUM SERPL-MCNC: 2.3 MG/DL (ref 1.8–2.4)
ORGANISM ID, SPNG3: NORMAL
PLEASE NOTE, SPNG4: NORMAL
POTASSIUM SERPL-SCNC: 4.1 MMOL/L (ref 3.5–5.1)
S PNEUM AG SPEC QL LA: NEGATIVE
SERVICE CMNT-IMP: ABNORMAL
SODIUM SERPL-SCNC: 135 MMOL/L (ref 138–145)
SPECIMEN SOURCE: NORMAL
SPECIMEN SOURCE: NORMAL
SPECIMEN, SPNG1: NORMAL

## 2022-03-09 PROCEDURE — 2709999900 HC NON-CHARGEABLE SUPPLY

## 2022-03-09 PROCEDURE — 74011000258 HC RX REV CODE- 258: Performed by: INTERNAL MEDICINE

## 2022-03-09 PROCEDURE — 74011636637 HC RX REV CODE- 636/637: Performed by: INTERNAL MEDICINE

## 2022-03-09 PROCEDURE — 36415 COLL VENOUS BLD VENIPUNCTURE: CPT

## 2022-03-09 PROCEDURE — 99232 SBSQ HOSP IP/OBS MODERATE 35: CPT | Performed by: INTERNAL MEDICINE

## 2022-03-09 PROCEDURE — 80048 BASIC METABOLIC PNL TOTAL CA: CPT

## 2022-03-09 PROCEDURE — 74011250636 HC RX REV CODE- 250/636: Performed by: INTERNAL MEDICINE

## 2022-03-09 PROCEDURE — 94760 N-INVAS EAR/PLS OXIMETRY 1: CPT

## 2022-03-09 PROCEDURE — 74011250637 HC RX REV CODE- 250/637: Performed by: INTERNAL MEDICINE

## 2022-03-09 PROCEDURE — 74011000636 HC RX REV CODE- 636: Performed by: INTERNAL MEDICINE

## 2022-03-09 PROCEDURE — 74011250636 HC RX REV CODE- 250/636: Performed by: NURSE PRACTITIONER

## 2022-03-09 PROCEDURE — 65660000000 HC RM CCU STEPDOWN

## 2022-03-09 PROCEDURE — 94640 AIRWAY INHALATION TREATMENT: CPT

## 2022-03-09 PROCEDURE — 74011000250 HC RX REV CODE- 250: Performed by: INTERNAL MEDICINE

## 2022-03-09 PROCEDURE — 71260 CT THORAX DX C+: CPT

## 2022-03-09 PROCEDURE — 82962 GLUCOSE BLOOD TEST: CPT

## 2022-03-09 PROCEDURE — 83735 ASSAY OF MAGNESIUM: CPT

## 2022-03-09 PROCEDURE — 77010033678 HC OXYGEN DAILY

## 2022-03-09 RX ORDER — SODIUM CHLORIDE 0.9 % (FLUSH) 0.9 %
10 SYRINGE (ML) INJECTION
Status: COMPLETED | OUTPATIENT
Start: 2022-03-09 | End: 2022-03-09

## 2022-03-09 RX ADMIN — CILOSTAZOL 50 MG: 50 TABLET ORAL at 17:42

## 2022-03-09 RX ADMIN — SODIUM CHLORIDE, PRESERVATIVE FREE 10 ML: 5 INJECTION INTRAVENOUS at 21:38

## 2022-03-09 RX ADMIN — CEFEPIME 1 G: 1 INJECTION, POWDER, FOR SOLUTION INTRAMUSCULAR; INTRAVENOUS at 16:07

## 2022-03-09 RX ADMIN — SODIUM CHLORIDE 100 ML: 9 INJECTION, SOLUTION INTRAVENOUS at 14:48

## 2022-03-09 RX ADMIN — MONTELUKAST 10 MG: 10 TABLET, FILM COATED ORAL at 21:36

## 2022-03-09 RX ADMIN — DOXYCYCLINE 100 MG: 100 INJECTION, POWDER, LYOPHILIZED, FOR SOLUTION INTRAVENOUS at 08:43

## 2022-03-09 RX ADMIN — INSULIN LISPRO 2 UNITS: 100 INJECTION, SOLUTION INTRAVENOUS; SUBCUTANEOUS at 11:52

## 2022-03-09 RX ADMIN — INSULIN LISPRO 2 UNITS: 100 INJECTION, SOLUTION INTRAVENOUS; SUBCUTANEOUS at 21:47

## 2022-03-09 RX ADMIN — CILOSTAZOL 50 MG: 50 TABLET ORAL at 08:44

## 2022-03-09 RX ADMIN — CLOPIDOGREL BISULFATE 75 MG: 75 TABLET ORAL at 08:44

## 2022-03-09 RX ADMIN — PIPERACILLIN AND TAZOBACTAM 3.38 G: 3; .375 INJECTION, POWDER, LYOPHILIZED, FOR SOLUTION INTRAVENOUS; PARENTERAL at 10:41

## 2022-03-09 RX ADMIN — AMIODARONE HYDROCHLORIDE 400 MG: 200 TABLET ORAL at 08:44

## 2022-03-09 RX ADMIN — APIXABAN 5 MG: 5 TABLET, FILM COATED ORAL at 08:44

## 2022-03-09 RX ADMIN — CARVEDILOL 12.5 MG: 12.5 TABLET, FILM COATED ORAL at 16:07

## 2022-03-09 RX ADMIN — FLUTICASONE PROPIONATE 2 SPRAY: 50 SPRAY, METERED NASAL at 08:51

## 2022-03-09 RX ADMIN — MAGNESIUM OXIDE TAB 400 MG (241.3 MG ELEMENTAL MG) 400 MG: 400 (241.3 MG) TAB at 08:44

## 2022-03-09 RX ADMIN — CARVEDILOL 12.5 MG: 12.5 TABLET, FILM COATED ORAL at 08:44

## 2022-03-09 RX ADMIN — Medication 10 ML: at 14:48

## 2022-03-09 RX ADMIN — AMIODARONE HYDROCHLORIDE 400 MG: 200 TABLET ORAL at 17:42

## 2022-03-09 RX ADMIN — IOPAMIDOL 80 ML: 755 INJECTION, SOLUTION INTRAVENOUS at 14:48

## 2022-03-09 RX ADMIN — DOXYCYCLINE 100 MG: 100 INJECTION, POWDER, LYOPHILIZED, FOR SOLUTION INTRAVENOUS at 21:37

## 2022-03-09 RX ADMIN — SODIUM CHLORIDE, PRESERVATIVE FREE 10 ML: 5 INJECTION INTRAVENOUS at 07:33

## 2022-03-09 RX ADMIN — INSULIN LISPRO 2 UNITS: 100 INJECTION, SOLUTION INTRAVENOUS; SUBCUTANEOUS at 16:06

## 2022-03-09 RX ADMIN — PIPERACILLIN AND TAZOBACTAM 3.38 G: 3; .375 INJECTION, POWDER, LYOPHILIZED, FOR SOLUTION INTRAVENOUS; PARENTERAL at 01:31

## 2022-03-09 RX ADMIN — APIXABAN 5 MG: 5 TABLET, FILM COATED ORAL at 21:36

## 2022-03-09 RX ADMIN — BUDESONIDE AND FORMOTEROL FUMARATE DIHYDRATE 2 PUFF: 80; 4.5 AEROSOL RESPIRATORY (INHALATION) at 20:18

## 2022-03-09 RX ADMIN — VANCOMYCIN HYDROCHLORIDE 1250 MG: 10 INJECTION, POWDER, LYOPHILIZED, FOR SOLUTION INTRAVENOUS at 02:39

## 2022-03-09 RX ADMIN — BUDESONIDE AND FORMOTEROL FUMARATE DIHYDRATE 2 PUFF: 80; 4.5 AEROSOL RESPIRATORY (INHALATION) at 07:58

## 2022-03-09 RX ADMIN — VALSARTAN 320 MG: 320 TABLET, FILM COATED ORAL at 08:44

## 2022-03-09 RX ADMIN — ROSUVASTATIN CALCIUM 10 MG: 10 TABLET, FILM COATED ORAL at 08:44

## 2022-03-09 RX ADMIN — HYDROCODONE BITARTRATE AND ACETAMINOPHEN 1 TABLET: 7.5; 325 TABLET ORAL at 21:36

## 2022-03-09 NOTE — PROGRESS NOTES
Hospitalist Progress Note   Admit Date:  3/4/2022  3:47 PM   Name:  Micky Vargas   Age:  67 y.o. Sex:  male  :  1945   MRN:  [id number]   Room:      Presenting Complaint: Chest Pain    Reason(s) for Admission: VT (ventricular tachycardia) [facility]) [I47.2]     Hospital Course & Interval History:   Micky Vargas is a 67 y.o. 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 Medicine service consulted to manage patient's pneumonia.     Pneumonia: As per patient he has prolonged hospitalization for Covid and after that he had pneumonia couple of times in the last year. He denies any cough while eating. As per patient he is dealing with cough from last few days which is productive. As far as he remember, sputum is of white color. Denies any fever before this hospitalization. Denies any chills. As per patient he does not use oxygen at home and currently is on 2 L oxygen. Denies any chest pain at present, palpitation, nausea or vomiting.     Ventricular tachycardia: Status post ICD.     Diabetes mellitus: Takes multiple medications at home and as per patient his blood sugar is controlled at home. Subjective/24hr Events (22): Patient seen and examined. Patient denies fever chills nausea vomiting chest pain or shortness of breath. ROS:  10 systems reviewed and negative except as noted above. Assessment & Plan:   Principal Problem:  #pneumonia: Patient history of cough with productive sputum and now fever, cannot rule out pneumonia. 3 showing pulmonary infiltrate. Primary team has already started patient on vancomycin and Zosyn. I have adjusted Zosyn dose based on kidney function. Added doxycycline to cover atypical.  Urine Legionella and streptococcal antigen ordered. Follow-up panculture. Respiratory support. As needed DuoNeb and Standing incentive spirometer ordered. No wheezing noted on examination today so will hold on steroids. 3/9-patient noted to have had Covid approximately 6 months ago with multiple rounds of antibiotics for concern for recurrent pneumonia with poor clinical presentation and worsening white blood cell count on vancomycin and Zosyn. We will get a CT of chest with IV contrast to evaluate for potential other etiologies and better delineate degree of patient's pneumonia. Continue vancomycin will discontinue Zosyn in favor of cefepime secondary to potential compounding effects of vancomycin and Zosyn used simultaneously with respect to AGUSTIN     #Diabetes mellitus: Holding oral hypoglycemic agent in the hospital.  Sliding scale insulin ordered. Will need adjustment of insulin based on blood sugar.     Ventricular tachycardia-cardiology plans for ICD placement.    .     Rest management per primary team.      Hospital Problems as of 3/9/2022 Date Reviewed: 3/1/2022          Codes Class Noted - Resolved POA    * (Principal) Ventricular tachycardia ([facility] ICD-10-CM: I47.2  ICD-9-CM: 427.1  3/4/2022 - Present Unknown        Atrial fibrillation ([facility] ICD-10-CM: I48.91  ICD-9-CM: 427.31  3/4/2022 - Present Unknown        VT (ventricular tachycardia) ([facility]) ICD-10-CM: I47.2  ICD-9-CM: 427.1  3/4/2022 - Present Unknown        Encounter for long-term (current) drug use ICD-10-CM: Z79.899  ICD-9-CM: V58.69  11/10/2015 - Present Unknown        COPD (chronic obstructive pulmonary disease) ([facility] ICD-10-CM: J44.9  ICD-9-CM: 826  4/10/2015 - Present Yes        DM (diabetes mellitus) type II, controlled, with peripheral vascular disorder (HCC) (Chronic) ICD-10-CM: E11.51  ICD-9-CM: 250.70, 443.81  9/11/2014 - Present Yes        Coronary artery disease involving native coronary artery of native heart without angina pectoris ICD-10-CM: I25.10  ICD-9-CM: 414.01  10/12/2011 - Present Yes        HTN (hypertension) (Chronic) ICD-10-CM: I10  ICD-9-CM: 401.9  10/12/2011 - Present Yes        Severe obesity (BMI 35.0-39. 9) with comorbidity [facility]) ICD-10-CM: E66.01  ICD-9-CM: 278.01  10/12/2011 - Present Yes              Objective:     Patient Vitals for the past 24 hrs:   Temp Pulse Resp BP SpO2   03/09/22 1106 98.1 °F (36.7 °C) 73 17 (!) 103/55 92 %   03/09/22 0758 -- -- -- -- 94 %   03/09/22 0704 97.4 °F (36.3 °C) 69 20 117/64 94 %   03/09/22 0242 99.1 °F (37.3 °C) 65 18 (!) 103/59 93 %   03/08/22 2250 99.2 °F (37.3 °C) 73 20 123/65 95 %   03/08/22 2044 -- -- -- -- 95 %   03/08/22 1813 98.1 °F (36.7 °C) 70 16 (!) 103/59 96 %   03/08/22 1510 97.7 °F (36.5 °C) 68 18 (!) 114/59 96 %     Oxygen Therapy  O2 Sat (%): 92 % (03/09/22 1106)  Pulse via Oximetry: 67 beats per minute (03/09/22 0758)  O2 Device: None (Room air) (03/09/22 1153)  Skin Assessment: Clean, dry, & intact (03/09/22 0758)  O2 Flow Rate (L/min): 3 l/min (03/09/22 0855)  FIO2 (%): 21 % (03/05/22 2143)    Estimated body mass index is 37.68 kg/m² as calculated from the following:    Height as of this encounter: 6' 1\" (1.854 m). Weight as of this encounter: 129.5 kg (285 lb 9.6 oz). Intake/Output Summary (Last 24 hours) at 3/9/2022 1445  Last data filed at 3/9/2022 1227  Gross per 24 hour   Intake 570 ml   Output 1250 ml   Net -680 ml         Physical Exam:     Blood pressure (!) 103/55, pulse 73, temperature 98.1 °F (36.7 °C), resp. rate 17, height 6' 1\" (1.854 m), weight 129.5 kg (285 lb 9.6 oz), SpO2 92 %. General:    Well nourished. No overt distress  Head:  Normocephalic, atraumatic  Eyes:  Sclerae appear normal.  Pupils equally round. ENT:  Nares appear normal, no drainage. Moist oral mucosa  Neck:  No restricted ROM. Trachea midline   CV:   RRR. No m/r/g. No jugular venous distension.   ICD device in place with bandage over it  Lungs:   CTAB. No wheezing, rhonchi, or rales. Respirations even, unlabored  Abdomen: Bowel sounds present. Soft, nontender, nondistended. Extremities: No cyanosis or clubbing. No edema  Skin:     No rashes and normal coloration. Warm and dry. Neuro:  CN II-XII grossly intact. Sensation intact. A&Ox3  Psych:  Normal mood and affect. I have reviewed ordered lab tests and independently visualized imaging below:    Recent Labs:  Recent Results (from the past 48 hour(s))   GLUCOSE, POC    Collection Time: 03/07/22  6:18 PM   Result Value Ref Range    Glucose (POC) 121 (H) 65 - 100 mg/dL    Performed by [address], POC    Collection Time: 03/07/22  8:37 PM   Result Value Ref Range    Glucose (POC) 210 (H) 65 - 100 mg/dL    Performed by Adiel.     URINALYSIS W/ RFLX MICROSCOPIC    Collection Time: 03/07/22  8:59 PM   Result Value Ref Range    Color YELLOW      Appearance CLEAR      Specific gravity 1.019 1.001 - 1.023      pH (UA) 5.5 5.0 - 9.0      Protein 30 (A) NEG mg/dL    Glucose 250 mg/dL    Ketone Negative NEG mg/dL    Bilirubin Negative NEG      Blood Negative NEG      Urobilinogen 0.2 0.2 - 1.0 EU/dL    Nitrites Negative NEG      Leukocyte Esterase Negative NEG      WBC 0-3 0 /hpf    RBC 0-3 0 /hpf    Epithelial cells 0 0 /hpf    Bacteria 0 0 /hpf    Casts 0-3 0 /lpf   CULTURE, URINE    Collection Time: 03/07/22  8:59 PM    Specimen: Clean catch; Urine    URINE   Result Value Ref Range    Special Requests: NO SPECIAL REQUESTS      Culture result: <10,000 COLONIES/mL NORMAL SKIN CARLOS ISOLATED     RESPIRATORY VIRUS PANEL W/COVID-19, PCR    Collection Time: 03/07/22  9:28 PM    Specimen: Nasopharyngeal   Result Value Ref Range    Adenovirus NOT DETECTED NOTDET      Coronavirus 229E NOT DETECTED NOTDET      Coronavirus HKU1 NOT DETECTED NOTDET      Coronavirus CVNL63 NOT DETECTED NOTDET      Coronavirus OC43 NOT DETECTED NOTDET      SARS-CoV-2, PCR NOT DETECTED NOTDET      Metapneumovirus NOT DETECTED NOTDET      Rhinovirus and Enterovirus NOT DETECTED NOTDET      Influenza A NOT DETECTED NOTDET      Influenza B NOT DETECTED NOTDET      Parainfluenza 1 NOT DETECTED NOTDET      Parainfluenza 2 NOT DETECTED NOTDET      Parainfluenza 3 NOT DETECTED NOTDET      Parainfluenza virus 4 NOT DETECTED NOTDET      RSV by PCR NOT DETECTED NOTDET      B. parapertussis, PCR NOT DETECTED NOTDET      Bordetella pertussis - PCR NOT DETECTED NOTDET      Chlamydophila pneumoniae DNA, QL, PCR NOT DETECTED NOTDET      Mycoplasma pneumoniae DNA, QL, PCR NOT DETECTED NOTDET     CBC W/O DIFF    Collection Time: 03/07/22 11:19 PM   Result Value Ref Range    WBC 11.8 (H) 4.3 - 11.1 K/uL    RBC 3.60 (L) 4.23 - 5.6 M/uL    HGB 11.7 (L) 13.6 - 17.2 g/dL    HCT 35.1 (L) 41.1 - 50.3 %    MCV 97.5 79.6 - 97.8 FL    MCH 32.5 26.1 - 32.9 PG    MCHC 33.3 31.4 - 35.0 g/dL    RDW 15.1 (H) 11.9 - 14.6 %    PLATELET 014 845 - 546 K/uL    MPV 10.8 9.4 - 12.3 FL    ABSOLUTE NRBC 0.00 0.0 - 0.2 K/uL   METABOLIC PANEL, BASIC    Collection Time: 03/07/22 11:19 PM   Result Value Ref Range    Sodium 141 136 - 145 mmol/L    Potassium 4.3 3.5 - 5.1 mmol/L    Chloride 109 (H) 98 - 107 mmol/L    CO2 27 21 - 32 mmol/L    Anion gap 5 (L) 7 - 16 mmol/L    Glucose 173 (H) 65 - 100 mg/dL    BUN 18 8 - 23 MG/DL    Creatinine 1.50 0.8 - 1.5 MG/DL    GFR est AA 58 (L) >60 ml/min/1.73m2    GFR est non-AA 48 (L) >60 ml/min/1.73m2    Calcium 8.6 8.3 - 10.4 MG/DL   MAGNESIUM    Collection Time: 03/07/22 11:19 PM   Result Value Ref Range    Magnesium 2.0 1.8 - 2.4 mg/dL   METABOLIC PANEL, BASIC    Collection Time: 03/08/22  3:55 AM   Result Value Ref Range    Sodium 138 136 - 145 mmol/L    Potassium 4.3 3.5 - 5.1 mmol/L    Chloride 105 98 - 107 mmol/L    CO2 27 21 - 32 mmol/L    Anion gap 6 (L) 7 - 16 mmol/L    Glucose 121 (H) 65 - 100 mg/dL    BUN 21 8 - 23 MG/DL    Creatinine 1.70 (H) 0.8 - 1.5 MG/DL    GFR est AA 51 (L) >60 ml/min/1.73m2    GFR est non-AA 42 (L) >60 ml/min/1.73m2    Calcium 8.7 8.3 - 10.4 MG/DL   MAGNESIUM    Collection Time: 03/08/22  3:55 AM   Result Value Ref Range    Magnesium 2.2 1.8 - 2.4 mg/dL   LACTIC ACID    Collection Time: 03/08/22  3:55 AM   Result Value Ref Range    Lactic acid 1.1 0.4 - 2.0 MMOL/L   PROCALCITONIN    Collection Time: 03/08/22  3:55 AM   Result Value Ref Range    Procalcitonin 1.11 (H) 0.00 - 0.49 ng/mL   GLUCOSE, POC    Collection Time: 03/08/22  6:23 AM   Result Value Ref Range    Glucose (POC) 123 (H) 65 - 100 mg/dL    Performed by Tessa    MSSA/MRSA SC BY PCR, NASAL SWAB    Collection Time: 03/08/22  6:27 AM    Specimen: Nasal swab   Result Value Ref Range    Special Requests: NO SPECIAL REQUESTS      Culture result:        SA target not detected. A MRSA NEGATIVE, SA NEGATIVE test result does not preclude MRSA or SA nasal colonization. CBC WITH AUTOMATED DIFF    Collection Time: 03/08/22 10:42 AM   Result Value Ref Range    WBC 14.4 (H) 4.3 - 11.1 K/uL    RBC 3.48 (L) 4.23 - 5.6 M/uL    HGB 11.4 (L) 13.6 - 17.2 g/dL    HCT 34.0 (L) 41.1 - 50.3 %    MCV 97.7 79.6 - 97.8 FL    MCH 32.8 26.1 - 32.9 PG    MCHC 33.5 31.4 - 35.0 g/dL    RDW 15.2 (H) 11.9 - 14.6 %    PLATELET 404 442 - 474 K/uL    MPV 10.6 9.4 - 12.3 FL    ABSOLUTE NRBC 0.00 0.0 - 0.2 K/uL    DF AUTOMATED      NEUTROPHILS 83 (H) 43 - 78 %    LYMPHOCYTES 6 (L) 13 - 44 %    MONOCYTES 10 4.0 - 12.0 %    EOSINOPHILS 0 (L) 0.5 - 7.8 %    BASOPHILS 0 0.0 - 2.0 %    IMMATURE GRANULOCYTES 1 0.0 - 5.0 %    ABS. NEUTROPHILS 11.9 (H) 1.7 - 8.2 K/UL    ABS. LYMPHOCYTES 0.8 0.5 - 4.6 K/UL    ABS. MONOCYTES 1.4 (H) 0.1 - 1.3 K/UL    ABS. EOSINOPHILS 0.0 0.0 - 0.8 K/UL    ABS. BASOPHILS 0.0 0.0 - 0.2 K/UL    ABS. IMM.  GRANS. 0.1 0.0 - 0.5 K/UL   GLUCOSE, POC    Collection Time: 03/08/22 11:17 AM   Result Value Ref Range    Glucose (POC) 155 (H) 65 - 100 mg/dL    Performed by [employer] GLUCOSE, POC    Collection Time: 03/08/22  3:29 PM   Result Value Ref Range    Glucose (POC) 160 (H) 65 - 100 mg/dL    Performed by Floyd Hoyos, POC    Collection Time: 03/08/22  8:14 PM   Result Value Ref Range    Glucose (POC) 198 (H) 65 - 100 mg/dL    Performed by Anusha Fishre    GLUCOSE, POC    Collection Time: 03/09/22  6:04 AM   Result Value Ref Range    Glucose (POC) 142 (H) 65 - 100 mg/dL    Performed by Anusha Fisher    METABOLIC PANEL, BASIC    Collection Time: 03/09/22  6:59 AM   Result Value Ref Range    Sodium 135 (L) 138 - 145 mmol/L    Potassium 4.1 3.5 - 5.1 mmol/L    Chloride 104 98 - 107 mmol/L    CO2 26 21 - 32 mmol/L    Anion gap 5 (L) 7 - 16 mmol/L    Glucose 156 (H) 65 - 100 mg/dL    BUN 19 8 - 23 MG/DL    Creatinine 1.20 0.8 - 1.5 MG/DL    GFR est AA >60 >60 ml/min/1.73m2    GFR est non-AA >60 >60 ml/min/1.73m2    Calcium 8.7 8.3 - 10.4 MG/DL   MAGNESIUM    Collection Time: 03/09/22  6:59 AM   Result Value Ref Range    Magnesium 2.3 1.8 - 2.4 mg/dL   GLUCOSE, POC    Collection Time: 03/09/22 11:05 AM   Result Value Ref Range    Glucose (POC) 182 (H) 65 - 100 mg/dL    Performed by [facility]        All Micro Results     Procedure Component Value Units Date/Time    CULTURE, URINE [[id number]] Collected: 03/07/22 2059    Order Status: Completed Specimen: Urine from Clean catch Updated: 03/09/22 0901     Special Requests: NO SPECIAL REQUESTS        Culture result:       <10,000 COLONIES/mL NORMAL SKIN CARLOS ISOLATED          S.PNEUMO AG, UR/CSF [[id number]] Collected: 03/08/22 1117    Order Status: Completed Updated: 03/08/22 1139    LEGIONELLA PNEUMOPHILA AG, URINE [[id number]] Collected: 03/08/22 1117    Order Status: Completed Specimen: Urine Updated: 03/08/22 1139    MSSA/MRSA SC BY PCR, NASAL SWAB [[id number]] Collected: 03/08/22 0627    Order Status: Completed Specimen: Nasal swab Updated: 03/08/22 0809     Special Requests: NO SPECIAL REQUESTS        Culture result: SA target not detected. A MRSA NEGATIVE, SA NEGATIVE test result does not preclude MRSA or SA nasal colonization. CULTURE, BLOOD [[id number]] Collected: 03/08/22 0000    Order Status: Completed Specimen: Blood Updated: 03/08/22 0123    CULTURE, BLOOD [[id number]] Collected: 03/08/22 0007    Order Status: Completed Specimen: Blood Updated: 03/08/22 0123    RESPIRATORY VIRUS PANEL W/COVID-19, PCR [[id number]] Collected: 03/07/22 2128    Order Status: Completed Specimen: Nasopharyngeal Updated: 03/07/22 2317     Adenovirus NOT DETECTED        Coronavirus 229E NOT DETECTED        Coronavirus HKU1 NOT DETECTED        Coronavirus CVNL63 NOT DETECTED        Coronavirus OC43 NOT DETECTED        SARS-CoV-2, PCR NOT DETECTED        Metapneumovirus NOT DETECTED        Rhinovirus and Enterovirus NOT DETECTED        Influenza A NOT DETECTED        Influenza B NOT DETECTED        Parainfluenza 1 NOT DETECTED        Parainfluenza 2 NOT DETECTED        Parainfluenza 3 NOT DETECTED        Parainfluenza virus 4 NOT DETECTED        RSV by PCR NOT DETECTED        B. parapertussis, PCR NOT DETECTED        Bordetella pertussis - PCR NOT DETECTED        Chlamydophila pneumoniae DNA, QL, PCR NOT DETECTED        Mycoplasma pneumoniae DNA, QL, PCR NOT DETECTED             Other Studies:  No results found.     Current Meds:  Current Facility-Administered Medications   Medication Dose Route Frequency    sodium chloride 0.9 % bolus infusion 100 mL  100 mL IntraVENous RAD ONCE    iopamidoL (ISOVUE-370) 76 % injection 80 mL  80 mL IntraVENous ONCE    saline peripheral flush soln 10 mL  10 mL InterCATHeter RAD ONCE    cefepime (MAXIPIME) 1 g in 0.9% sodium chloride (MBP/ADV) 50 mL MBP  1 g IntraVENous Q8H    vancomycin (VANCOCIN) 1250 mg in  ml infusion  1,250 mg IntraVENous Q24H    doxycycline (VIBRAMYCIN) 100 mg in 0.9% sodium chloride (MBP/ADV) 100 mL MBP  100 mg IntraVENous Q12H    apixaban (ELIQUIS) tablet 5 mg  5 mg Oral Q12H    sodium chloride (NS) flush 5-40 mL  5-40 mL IntraVENous Q8H    sodium chloride (NS) flush 5-40 mL  5-40 mL IntraVENous PRN    carvediloL (COREG) tablet 12.5 mg  12.5 mg Oral BID WITH MEALS    amiodarone (CORDARONE) tablet 400 mg  400 mg Oral BID    sodium chloride (NS) flush 5-10 mL  5-10 mL IntraVENous PRN    albuterol (PROVENTIL VENTOLIN) nebulizer solution 2.5 mg  2.5 mg Nebulization Q4H PRN    valsartan (DIOVAN) tablet 320 mg  320 mg Oral DAILY    cilostazoL (PLETAL) tablet 50 mg  50 mg Oral BID    clopidogreL (PLAVIX) tablet 75 mg  75 mg Oral DAILY    budesonide-formoterol (SYMBICORT) 80-4.5 mcg inhaler  2 Puff Inhalation BID RT    fluticasone propionate (FLONASE) 50 mcg/actuation nasal spray 2 Spray  2 Spray Both Nostrils DAILY    [Held by provider] glipiZIDE (GLUCOTROL) tablet 10 mg  10 mg Oral BID    insulin lispro (HUMALOG) injection   SubCUTAneous AC&HS    [Held by provider] alogliptin (NESINA) tablet 12.5 mg  12.5 mg Oral DAILY    magnesium oxide (MAG-OX) tablet 400 mg  400 mg Oral DAILY    montelukast (SINGULAIR) tablet 10 mg  10 mg Oral QHS    polyethylene glycol (MIRALAX) packet 17 g  17 g Oral DAILY    rosuvastatin (CRESTOR) tablet 10 mg  10 mg Oral DAILY    sodium chloride (NS) flush 5-40 mL  5-40 mL IntraVENous PRN    nitroglycerin (NITROSTAT) tablet 0.4 mg  0.4 mg SubLINGual Q5MIN PRN    morphine injection 2 mg  2 mg IntraVENous Q4H PRN    acetaminophen (TYLENOL) tablet 650 mg  650 mg Oral Q4H PRN    HYDROcodone-acetaminophen (NORCO) 7.5-325 mg per tablet 1 Tablet  1 Tablet Oral Q4H PRN    promethazine (PHENERGAN) tablet 25 mg  25 mg Oral Q6H PRN       Signed:  Merline Boards, DO    Part of this note may have been written by using a voice dictation software. The note has been proof read but may still contain some grammatical/other typographical errors.

## 2022-03-09 NOTE — PROGRESS NOTES
Chart screened by CM for d/c planning. Pt is currently on RA. Waning between 3L O2 NC and RA. Pt will require a 6MWT prior to d/c. PNA is improving. Cardiology estimates d/c in 24-48 hours if pt continues to improve. There have been no PT, OT, or SLP consults ordered. Anticipated d/c plan is for pt to return home with spouse when medically stable. CM will continue to follow and remain available if any needs arise.

## 2022-03-09 NOTE — PROGRESS NOTES
[facility] CARDIOLOGY PROGRESS NOTE           3/9/2022 10:59 AM    Admit Date: 3/4/2022    Admit Diagnosis: VT (ventricular tachycardia) ([facility] [I47.2]    Assessment:   Principal Problem:    Ventricular tachycardia ([address] (3/4/2022)      HCAP     Active Problems:    Coronary artery disease involving native coronary artery of native heart without angina pectoris (10/12/2011)      HTN (hypertension) (10/12/2011)      Severe obesity (BMI 35.0-39. 9) with comorbidity ([address] (10/12/2011)      DM (diabetes mellitus) type II, controlled, with peripheral vascular disorder ([address] (9/11/2014)      COPD (chronic obstructive pulmonary disease) ([address] (4/10/2015)      Atrial fibrillation ([address] (3/4/2022)      VT (ventricular tachycardia) ([address] (3/4/2022)    Plan:   1. VT - s/p DC ICD. Stable device interrogation, lead position stable on CXR, stable wound. 2. PNA - continue IV abx, appreciate hospitalist recs. 3. CAD - continue OMT and use of Eliquis/plavix per IC. 4. AF - in NSR, continue Eliquis. 5. Dispo - Possibly in 24-48 pending treatment of PNA. Patient is being seen today within a 90-day global period, but is being seen for a separately identifiable E/M service and is not related to the post-operative care of the procedure. Thank you for allowing me to participate in the electrophysiologic care of this most pleasant patient. Please feel free to contact me if there are any questions or concerns. Franco Charles. Javier Perkins MD, MS  Clinical Cardiac Electrophysiology  [facility]    Subjective:   No complaints this AM, no chest pain or shortness of breath. +Fever overnight and appears to have a PNA now on IV abx.     Interval History: (History of pertinent interval events obtained from nursing staff)    ROS:  GEN:  No fever or chills  Cardiovascular:  As noted above  Pulmonary:  As noted above  Neuro:  No new focal motor or sensory loss      Objective:     Vitals:    03/09/22 0242 03/09/22 0559 03/09/22 0704 03/09/22 0758   BP: (!) 103/59  117/64    Pulse: 65  69    Resp: 18  20    Temp: 99.1 °F (37.3 °C)  97.4 °F (36.3 °C)    SpO2: 93%  94% 94%   Weight:  285 lb 9.6 oz (129.5 kg)     Height:           Physical Exam:  General-Well Developed, Well Nourished, No Acute Distress, Alert & Oriented x 3, appropriate mood. Obese. Neck- supple, no JVD  CV- regular rate and rhythm no MRG, left sided CIED C/D/I   Lung- Adventitious sounds lower lung fields. Abd- soft, nontender, nondistended  Ext- no edema bilaterally.   Skin- warm and dry    Current Facility-Administered Medications   Medication Dose Route Frequency    vancomycin (VANCOCIN) 1250 mg in  ml infusion  1,250 mg IntraVENous Q24H    doxycycline (VIBRAMYCIN) 100 mg in 0.9% sodium chloride (MBP/ADV) 100 mL MBP  100 mg IntraVENous Q12H    piperacillin-tazobactam (ZOSYN) 3.375 g in 0.9% sodium chloride (MBP/ADV) 100 mL MBP  3.375 g IntraVENous Q8H    apixaban (ELIQUIS) tablet 5 mg  5 mg Oral Q12H    sodium chloride (NS) flush 5-40 mL  5-40 mL IntraVENous Q8H    sodium chloride (NS) flush 5-40 mL  5-40 mL IntraVENous PRN    carvediloL (COREG) tablet 12.5 mg  12.5 mg Oral BID WITH MEALS    amiodarone (CORDARONE) tablet 400 mg  400 mg Oral BID    sodium chloride (NS) flush 5-10 mL  5-10 mL IntraVENous PRN    albuterol (PROVENTIL VENTOLIN) nebulizer solution 2.5 mg  2.5 mg Nebulization Q4H PRN    valsartan (DIOVAN) tablet 320 mg  320 mg Oral DAILY    cilostazoL (PLETAL) tablet 50 mg  50 mg Oral BID    clopidogreL (PLAVIX) tablet 75 mg  75 mg Oral DAILY    budesonide-formoterol (SYMBICORT) 80-4.5 mcg inhaler  2 Puff Inhalation BID RT    fluticasone propionate (FLONASE) 50 mcg/actuation nasal spray 2 Spray  2 Spray Both Nostrils DAILY    [Held by provider] glipiZIDE (GLUCOTROL) tablet 10 mg  10 mg Oral BID    insulin lispro (HUMALOG) injection   SubCUTAneous AC&HS    [Held by provider] alogliptin (NESINA) tablet 12.5 mg  12.5 mg Oral DAILY    magnesium oxide (MAG-OX) tablet 400 mg  400 mg Oral DAILY    montelukast (SINGULAIR) tablet 10 mg  10 mg Oral QHS    polyethylene glycol (MIRALAX) packet 17 g  17 g Oral DAILY    rosuvastatin (CRESTOR) tablet 10 mg  10 mg Oral DAILY    sodium chloride (NS) flush 5-40 mL  5-40 mL IntraVENous PRN    nitroglycerin (NITROSTAT) tablet 0.4 mg  0.4 mg SubLINGual Q5MIN PRN    morphine injection 2 mg  2 mg IntraVENous Q4H PRN    acetaminophen (TYLENOL) tablet 650 mg  650 mg Oral Q4H PRN    HYDROcodone-acetaminophen (NORCO) 7.5-325 mg per tablet 1 Tablet  1 Tablet Oral Q4H PRN    promethazine (PHENERGAN) tablet 25 mg  25 mg Oral Q6H PRN       Data Review:   Recent Results (from the past 24 hour(s))   GLUCOSE, POC    Collection Time: 03/08/22 11:17 AM   Result Value Ref Range    Glucose (POC) 155 (H) 65 - 100 mg/dL    Performed by Neeta Child    GLUCOSE, POC    Collection Time: 03/08/22  3:29 PM   Result Value Ref Range    Glucose (POC) 160 (H) 65 - 100 mg/dL    Performed by Joy    GLUCOSE, POC    Collection Time: 03/08/22  8:14 PM   Result Value Ref Range    Glucose (POC) 198 (H) 65 - 100 mg/dL    Performed by Cassandra Prdao    GLUCOSE, POC    Collection Time: 03/09/22  6:04 AM   Result Value Ref Range    Glucose (POC) 142 (H) 65 - 100 mg/dL    Performed by Cassandra Prado    METABOLIC PANEL, BASIC    Collection Time: 03/09/22  6:59 AM   Result Value Ref Range    Sodium 135 (L) 138 - 145 mmol/L    Potassium 4.1 3.5 - 5.1 mmol/L    Chloride 104 98 - 107 mmol/L    CO2 26 21 - 32 mmol/L    Anion gap 5 (L) 7 - 16 mmol/L    Glucose 156 (H) 65 - 100 mg/dL    BUN 19 8 - 23 MG/DL    Creatinine 1.20 0.8 - 1.5 MG/DL    GFR est AA >60 >60 ml/min/1.73m2    GFR est non-AA >60 >60 ml/min/1.73m2    Calcium 8.7 8.3 - 10.4 MG/DL   MAGNESIUM    Collection Time: 03/09/22  6:59 AM   Result Value Ref Range    Magnesium 2.3 1.8 - 2.4 mg/dL       EKG:  (EKG has been independently visualized by me with interpretation below): NSR, FDAVB, PVCs.

## 2022-03-09 NOTE — PROGRESS NOTES
Problem: Falls - Risk of  Goal: *Absence of Falls  Description: Document Jacinda Ground Fall Risk and appropriate interventions in the flowsheet. Outcome: Progressing Towards Goal  Note: Fall Risk Interventions:  Mobility Interventions: Bed/chair exit alarm,Communicate number of staff needed for ambulation/transfer,Patient to call before getting OOB         Medication Interventions: Bed/chair exit alarm,Patient to call before getting OOB,Teach patient to arise slowly    Elimination Interventions: Bed/chair exit alarm,Call light in reach,Patient to call for help with toileting needs,Stay With Me (per policy),Toilet paper/wipes in reach,Urinal in reach              Problem: Pain  Goal: *Control of Pain  Outcome: Progressing Towards Goal     Problem: Pressure Injury - Risk of  Goal: *Prevention of pressure injury  Description: Document Hemant Scale and appropriate interventions in the flowsheet.   Outcome: Progressing Towards Goal  Note: Pressure Injury Interventions:       Moisture Interventions: Absorbent underpads,Minimize layers    Activity Interventions: Increase time out of bed,Pressure redistribution bed/mattress(bed type)    Mobility Interventions: HOB 30 degrees or less,Pressure redistribution bed/mattress (bed type)    Nutrition Interventions: Document food/fluid/supplement intake

## 2022-03-09 NOTE — PROGRESS NOTES
Bedside and Verbal shift change report given to self (oncoming nurse) by Verita Payor (offgoing nurse). Report included the following information SBAR and Kardex.

## 2022-03-09 NOTE — PROGRESS NOTES
Verbal bedside report given to oncoming RN, Rayna Mcintosh. Patient's situation, background, assessment and recommendations provided. Opportunity for questions provided. Oncoming RN assumed care of patient.

## 2022-03-09 NOTE — PROGRESS NOTES
VANCO DAILY FOLLOW UP NOTE  [facility] Pharmacokinetic Monitoring Service - Vancomycin    Consulting Provider: Dr. Warner Saliva   Indication: Sepsis of unknown etiology  Target Concentration: Goal AUC/LORA 400-600 mg*hr/L  Day of Therapy: 2  Additional Antimicrobials: Pip/Tazo    Pertinent Laboratory Values: Wt Readings from Last 1 Encounters:   03/09/22 129.5 kg (285 lb 9.6 oz)     Temp Readings from Last 1 Encounters:   03/09/22 97.4 °F (36.3 °C)     No components found for: PROCAL  Recent Labs     03/09/22  0659 03/08/22  1042 03/08/22  0355 03/07/22  2319   BUN 19  --  21 18   CREA 1.20  --  1.70* 1.50   WBC  --  14.4*  --  11.8*   PCT  --   --  1.11*  --    LAC  --   --  1.1  --      Estimated Creatinine Clearance: 72.7 mL/min (based on SCr of 1.2 mg/dL). Lab Results   Component Value Date/Time    Vancomycin,trough 12.8 11/07/2014 08:00 PM       MRSA Nasal Swab: N/A. Non-respiratory infection. .      Assessment:  Date/Time Dose Concentration AUC         Note: Serum concentrations collected for AUC dosing may appear elevated if collected in close proximity to the dose administered, this is not necessarily an indication of toxicity    Plan:  Initiated on vancomycin overnight, will continue current regimen of 1250 mg every 24 hours  Vancomycin concentration ordered for 3/10 @ 0800  Pharmacy will continue to monitor patient and adjust therapy as indicated    Thank you for the consult,  Ty Tyson, BLASD

## 2022-03-10 VITALS
HEART RATE: 63 BPM | SYSTOLIC BLOOD PRESSURE: 131 MMHG | DIASTOLIC BLOOD PRESSURE: 70 MMHG | OXYGEN SATURATION: 92 % | WEIGHT: 286.4 LBS | RESPIRATION RATE: 18 BRPM | TEMPERATURE: 97.9 F | BODY MASS INDEX: 37.96 KG/M2 | HEIGHT: 73 IN

## 2022-03-10 LAB
ANION GAP SERPL CALC-SCNC: 4 MMOL/L (ref 7–16)
BACTERIA SPEC CULT: NORMAL
BASOPHILS # BLD: 0 K/UL (ref 0–0.2)
BASOPHILS NFR BLD: 0 % (ref 0–2)
BUN SERPL-MCNC: 18 MG/DL (ref 8–23)
CALCIUM SERPL-MCNC: 8.6 MG/DL (ref 8.3–10.4)
CHLORIDE SERPL-SCNC: 106 MMOL/L (ref 98–107)
CO2 SERPL-SCNC: 27 MMOL/L (ref 21–32)
CREAT SERPL-MCNC: 1.1 MG/DL (ref 0.8–1.5)
DIFFERENTIAL METHOD BLD: ABNORMAL
EOSINOPHIL # BLD: 0.1 K/UL (ref 0–0.8)
EOSINOPHIL NFR BLD: 1 % (ref 0.5–7.8)
ERYTHROCYTE [DISTWIDTH] IN BLOOD BY AUTOMATED COUNT: 14.8 % (ref 11.9–14.6)
GLUCOSE BLD STRIP.AUTO-MCNC: 154 MG/DL (ref 65–100)
GLUCOSE SERPL-MCNC: 137 MG/DL (ref 65–100)
HCT VFR BLD AUTO: 31.8 % (ref 41.1–50.3)
HGB BLD-MCNC: 10.6 G/DL (ref 13.6–17.2)
IMM GRANULOCYTES # BLD AUTO: 0.1 K/UL (ref 0–0.5)
IMM GRANULOCYTES NFR BLD AUTO: 1 % (ref 0–5)
LYMPHOCYTES # BLD: 0.9 K/UL (ref 0.5–4.6)
LYMPHOCYTES NFR BLD: 11 % (ref 13–44)
MAGNESIUM SERPL-MCNC: 2.3 MG/DL (ref 1.8–2.4)
MCH RBC QN AUTO: 32.3 PG (ref 26.1–32.9)
MCHC RBC AUTO-ENTMCNC: 33.3 G/DL (ref 31.4–35)
MCV RBC AUTO: 97 FL (ref 79.6–97.8)
MONOCYTES # BLD: 0.7 K/UL (ref 0.1–1.3)
MONOCYTES NFR BLD: 8 % (ref 4–12)
NEUTS SEG # BLD: 6.7 K/UL (ref 1.7–8.2)
NEUTS SEG NFR BLD: 79 % (ref 43–78)
NRBC # BLD: 0 K/UL (ref 0–0.2)
PLATELET # BLD AUTO: 145 K/UL (ref 150–450)
PMV BLD AUTO: 10.4 FL (ref 9.4–12.3)
POTASSIUM SERPL-SCNC: 4.1 MMOL/L (ref 3.5–5.1)
RBC # BLD AUTO: 3.28 M/UL (ref 4.23–5.6)
SERVICE CMNT-IMP: ABNORMAL
SERVICE CMNT-IMP: NORMAL
SODIUM SERPL-SCNC: 137 MMOL/L (ref 138–145)
WBC # BLD AUTO: 8.4 K/UL (ref 4.3–11.1)

## 2022-03-10 PROCEDURE — 74011000250 HC RX REV CODE- 250: Performed by: INTERNAL MEDICINE

## 2022-03-10 PROCEDURE — 2709999900 HC NON-CHARGEABLE SUPPLY

## 2022-03-10 PROCEDURE — 74011250637 HC RX REV CODE- 250/637: Performed by: INTERNAL MEDICINE

## 2022-03-10 PROCEDURE — 80048 BASIC METABOLIC PNL TOTAL CA: CPT

## 2022-03-10 PROCEDURE — 94760 N-INVAS EAR/PLS OXIMETRY 1: CPT

## 2022-03-10 PROCEDURE — 83735 ASSAY OF MAGNESIUM: CPT

## 2022-03-10 PROCEDURE — 74011250636 HC RX REV CODE- 250/636: Performed by: INTERNAL MEDICINE

## 2022-03-10 PROCEDURE — 99024 POSTOP FOLLOW-UP VISIT: CPT | Performed by: INTERNAL MEDICINE

## 2022-03-10 PROCEDURE — 74011000258 HC RX REV CODE- 258: Performed by: INTERNAL MEDICINE

## 2022-03-10 PROCEDURE — 36415 COLL VENOUS BLD VENIPUNCTURE: CPT

## 2022-03-10 PROCEDURE — 82962 GLUCOSE BLOOD TEST: CPT

## 2022-03-10 PROCEDURE — 74011250636 HC RX REV CODE- 250/636: Performed by: NURSE PRACTITIONER

## 2022-03-10 PROCEDURE — 85025 COMPLETE CBC W/AUTO DIFF WBC: CPT

## 2022-03-10 PROCEDURE — 94640 AIRWAY INHALATION TREATMENT: CPT

## 2022-03-10 PROCEDURE — 74011636637 HC RX REV CODE- 636/637: Performed by: INTERNAL MEDICINE

## 2022-03-10 RX ORDER — VALSARTAN 320 MG/1
320 TABLET ORAL DAILY
Qty: 30 TABLET | Refills: 5 | Status: SHIPPED | OUTPATIENT
Start: 2022-03-10

## 2022-03-10 RX ORDER — AMIODARONE HYDROCHLORIDE 200 MG/1
200 TABLET ORAL 2 TIMES DAILY
Qty: 60 TABLET | Refills: 5 | Status: SHIPPED | OUTPATIENT
Start: 2022-03-10

## 2022-03-10 RX ORDER — AMOXICILLIN AND CLAVULANATE POTASSIUM 875; 125 MG/1; MG/1
1 TABLET, FILM COATED ORAL EVERY 12 HOURS
Qty: 20 TABLET | Refills: 0 | Status: SHIPPED | OUTPATIENT
Start: 2022-03-10 | End: 2022-03-20

## 2022-03-10 RX ORDER — AMIODARONE HYDROCHLORIDE 200 MG/1
200 TABLET ORAL 2 TIMES DAILY
Status: DISCONTINUED | OUTPATIENT
Start: 2022-03-10 | End: 2022-03-10 | Stop reason: HOSPADM

## 2022-03-10 RX ORDER — CARVEDILOL 25 MG/1
25 TABLET ORAL 2 TIMES DAILY WITH MEALS
Qty: 60 TABLET | Refills: 5 | Status: SHIPPED | OUTPATIENT
Start: 2022-03-10

## 2022-03-10 RX ORDER — HYDROCODONE BITARTRATE AND ACETAMINOPHEN 7.5; 325 MG/1; MG/1
1 TABLET ORAL
Qty: 10 TABLET | Refills: 0 | Status: SHIPPED | OUTPATIENT
Start: 2022-03-10 | End: 2022-03-13

## 2022-03-10 RX ORDER — AMOXICILLIN AND CLAVULANATE POTASSIUM 875; 125 MG/1; MG/1
1 TABLET, FILM COATED ORAL EVERY 12 HOURS
Status: DISCONTINUED | OUTPATIENT
Start: 2022-03-10 | End: 2022-03-10 | Stop reason: HOSPADM

## 2022-03-10 RX ORDER — CARVEDILOL 25 MG/1
25 TABLET ORAL 2 TIMES DAILY WITH MEALS
Status: DISCONTINUED | OUTPATIENT
Start: 2022-03-10 | End: 2022-03-10 | Stop reason: HOSPADM

## 2022-03-10 RX ADMIN — SODIUM CHLORIDE, PRESERVATIVE FREE 10 ML: 5 INJECTION INTRAVENOUS at 06:37

## 2022-03-10 RX ADMIN — VANCOMYCIN HYDROCHLORIDE 1250 MG: 10 INJECTION, POWDER, LYOPHILIZED, FOR SOLUTION INTRAVENOUS at 03:15

## 2022-03-10 RX ADMIN — ROSUVASTATIN CALCIUM 10 MG: 10 TABLET, FILM COATED ORAL at 08:35

## 2022-03-10 RX ADMIN — FLUTICASONE PROPIONATE 2 SPRAY: 50 SPRAY, METERED NASAL at 08:38

## 2022-03-10 RX ADMIN — VALSARTAN 320 MG: 320 TABLET, FILM COATED ORAL at 08:34

## 2022-03-10 RX ADMIN — BUDESONIDE AND FORMOTEROL FUMARATE DIHYDRATE 2 PUFF: 80; 4.5 AEROSOL RESPIRATORY (INHALATION) at 07:15

## 2022-03-10 RX ADMIN — MAGNESIUM OXIDE TAB 400 MG (241.3 MG ELEMENTAL MG) 400 MG: 400 (241.3 MG) TAB at 08:34

## 2022-03-10 RX ADMIN — AMIODARONE HYDROCHLORIDE 200 MG: 200 TABLET ORAL at 08:35

## 2022-03-10 RX ADMIN — AMOXICILLIN AND CLAVULANATE POTASSIUM 1 TABLET: 875; 125 TABLET, FILM COATED ORAL at 08:35

## 2022-03-10 RX ADMIN — APIXABAN 5 MG: 5 TABLET, FILM COATED ORAL at 08:35

## 2022-03-10 RX ADMIN — CEFEPIME 1 G: 1 INJECTION, POWDER, FOR SOLUTION INTRAMUSCULAR; INTRAVENOUS at 03:21

## 2022-03-10 RX ADMIN — CARVEDILOL 25 MG: 25 TABLET, FILM COATED ORAL at 08:34

## 2022-03-10 RX ADMIN — CILOSTAZOL 50 MG: 50 TABLET ORAL at 08:35

## 2022-03-10 RX ADMIN — INSULIN LISPRO 2 UNITS: 100 INJECTION, SOLUTION INTRAVENOUS; SUBCUTANEOUS at 08:38

## 2022-03-10 RX ADMIN — CLOPIDOGREL BISULFATE 75 MG: 75 TABLET ORAL at 08:36

## 2022-03-10 NOTE — DISCHARGE INSTRUCTIONS
DEVICE IMPLANT FOLLOWUP INSTRUCTIONS  You will be discharged with an occlusive dressing over the incision site. This dressing will be removed at the 10 -14-day postop site check with the [address]. Your new device will be checked at that visit and all your device teaching will be given. Keep the incision site dry until your follow up. Use a hand-held shower or bath. Do not submerge or soak in pools or tubs for 6 weeks. If you are discharged with a prescription for antibiotics, please take the full prescription until it is gone. After your site check, you may shower and get the incision site wet. You may let soap and water run on the incision and pat dry. Please do not apply creams, lotions or powders on or near the incision. Mild bruising and swelling can be normal after implant and will resolve in a few weeks. Call the office at [phone] if you have any of the following:  -Signs of infection, such as fever over 100 F, drainage from the incision, redness, significant swelling, or warmth at the incision site.  -Significant pain around the site that gets worse. Mild discomfort can be normal.   -Bleeding from the incision site  -Swelling in the arm on the side of the incision site  -Chest pain or shortness of breath     Your device has the capability of transmitting device information from home to our office using a home monitoring system or phone raymond. The information will transmit through a cellular connection outside of your home. Your device data is reviewed during working hours to ensure proper device function. You will be given your equipment and instruction upon your hospital discharge.                                                                       -You will be given a sling to wear upon discharge with instructions when it should be worn.    -Do not lift the affected arm above the shoulder level, on the side the device was put in, for 4 weeks.   -Do not lift or push more than 5 to 10 pounds for 4 weeks on the affected side. Walking is fine and encouraged.   -Driving is restricted for 5-7 days. Long travel is not recommended until after your site check.    -Do not do any vigorous upper body activities, such as golfing, bowling tennis or mowing for about 6 weeks. -If you work, you may return to work. However, with limitations on lifting for 4 weeks.   -Please avoid any dental work or invasive procedures for 6 weeks, if possible, after implant. Please avoid strong magnetic fields, large power plant transformers and arc welders. Please stay 10 feet away of electromagnetic fields such as working over a large running engine, stereo speakers and large stereo systems. Home appliances are safe. You may operate any electrical or battery-operated device in your home. Modern Devices are seldom affected by normally operating home appliances, such as microwave ovens. Flying is safe and airport metal detectors will not affect your device, although your device may occasionally set off the metal detectors. If this happens, show the  your identification card. Security wanding over the device is contraindicated. Cellular Phones should be used with the hand opposite to the side where the device was implanted. The phone should not be carried in the pocket on the side of the device. CDL licenses are not renewed if you have a defibrillator implanted. Radiation Therapy should be avoided on or near the device. Should you require surgery in the future; some electrosurgical devices can interfere with the device function. You should discuss this with your surgeon prior to any operation. If you are ordered an MRI, Please contact the clinic prior to ensure you have an MRI safe device. You must wait 6 weeks after implant before you may have an MRI. Tens units are contraindicated. [address] [phone]       Patient Education        Angiogram: What to Expect at Home  Your Recovery  An angiogram is an X-ray test that uses dye and a camera to take pictures of the blood flow in an artery or a vein. The doctor inserted a thin, flexible tube (catheter) into a blood vessel in your groin. In some cases, the catheter is placed in a blood vessel in the arm. An angiogram is done for many reasons. For example, you may have an angiogram to find the source of bleeding, such as an ulcer. Or it may be done to look for blocked blood vessels in your lungs. After an angiogram, your groin or arm may have a bruise and feel sore for a day or two. You can do light activities around the house but nothing strenuous for several days. Your doctor may give you specific instructions on when you can do your normal activities again, such as driving and going back to work. This care sheet gives you a general idea about how long it will take for you to recover. But each person recovers at a different pace. Follow the steps below to feel better as quickly as possible. How can you care for yourself at home? Activity    · Do not do strenuous exercise and do not lift, pull, or push anything heavy until your doctor says it is okay. This may be for a day or two. You can walk around the house and do light activity, such as cooking.     · If the catheter was placed in your groin, try not to walk up stairs for the first couple of days.     · If the catheter was placed in your arm near your wrist, do not bend your wrist deeply for the first couple of days. Be careful using your hand to get into and out of a chair or bed.     · If your doctor recommends it, get more exercise. Walking is a good choice. Bit by bit, increase the amount you walk every day. Try for at least 30 minutes on most days of the week. Diet    · Drink plenty of fluids to help your body flush out the dye. If you have kidney, heart, or liver disease and have to limit fluids, talk with your doctor before you increase the amount of fluids you drink.     · You can eat your normal diet. If your stomach is upset, try bland, low-fat foods like plain rice, broiled chicken, toast, and yogurt. Medicines    · Be safe with medicines. Read and follow all instructions on the label. ? If the doctor gave you a prescription medicine for pain, take it as prescribed. ? If you are not taking a prescription pain medicine, ask your doctor if you can take an over-the-counter medicine.     · If you take aspirin or some other blood thinner, ask your doctor if and when to start taking it again. Make sure that you understand exactly what your doctor wants you to do.     · Your doctor will tell you if and when you can restart your medicines. He or she will also give you instructions about taking any new medicines. Care of the catheter site    · You will have a dressing over the cut (incision). A dressing helps the incision heal and protects it. Your doctor will tell you how to take care of this.     · Put ice or a cold pack on the area for 10 to 20 minutes at a time to help with soreness or swelling. Put a thin cloth between the ice and your skin.     · You may shower 24 to 48 hours after the procedure, if your doctor okays it. Pat the incision dry.     · Do not soak the catheter site until it is healed. Don't take a bath for 1 week, or until your doctor tells you it is okay.     · Watch for bleeding from the site. A small amount of blood (up to the size of a quarter) on the bandage can be normal.     · If you are bleeding, lie down and press on the area for 15 minutes to try to make it stop. If the bleeding does not stop, call your doctor or seek immediate medical care. Follow-up care is a key part of your treatment and safety. Be sure to make and go to all appointments, and call your doctor if you are having problems.  It's also a good idea to know your test results and keep a list of the medicines you take. When should you call for help? Call 911 anytime you think you may need emergency care. For example, call if:    · You passed out (lost consciousness).     · You have severe trouble breathing.     · You have sudden chest pain and shortness of breath, or you cough up blood. Call your doctor now or seek immediate medical care if:    · You are bleeding from the area where the catheter was put in your artery.     · You have a fast-growing, painful lump at the catheter site.     · You have signs of infection, such as:  ? Increased pain, swelling, warmth, or redness. ? Red streaks leading from the incision. ? Pus draining from the incision. ? A fever. Watch closely for any changes in your health, and be sure to contact your doctor if:    · You don't get better as expected. Where can you learn more? Go to http://www.gray.com/  Enter [id number] in the search box to learn more about \"Angiogram: What to Expect at Home. \"  Current as of: June 17, 2021               Content Version: 13.2  © 0791-8160 [employer]. Care instructions adapted under license by [employer] (which disclaims liability or warranty for this information). If you have questions about a medical condition or this instruction, always ask your healthcare professional. [address] any warranty or liability for your use of this information. Patient Education        Electrical Cardioversion: What to Expect at Home  Your Recovery     Electrical cardioversion is a treatment for an abnormal heartbeat, such as atrial fibrillation, supraventricular tachycardia, or ventricular tachycardia (VT). Your doctor used a brief electrical shock to reset your heart's rhythm. After the procedure, you may have redness, like a sunburn, where the patches were.  The medicines you got to make you sleepy may make you feel drowsy for the rest of the day. Your doctor may have you take medicines to help the heart beat normally and to prevent blood clots. This care sheet gives you a general idea about how long it will take for you to recover. But each person recovers at a different pace. Follow the steps below to feel better as quickly as possible. How can you care for yourself at home? Medicines    · Be safe with medicines. Take your medicines exactly as prescribed. Call your doctor if you think you are having a problem with your medicine. You may take one or more of the following medicines:  ? Rate-control medicines to slow the heart rate. These include beta-blockers, calcium channel blockers, and digoxin. ? Rhythm control medicines that help the heart keep a normal rhythm. ? Blood thinners, also called anticoagulants, which help prevent blood clots. You will get more details on the specific medicines your doctor prescribes. Be sure you know how to take your medicines safely.     · Do not take any vitamins, over-the-counter medicines, or herbal products without talking to your doctor first.   Exercise    · Start light exercise if your doctor says that it's okay. Even a small amount will help you get stronger, have more energy, and manage your stress. Walking is an easy way to get exercise. Start out by walking a little more than you did in the hospital. Bit by bit, increase the amount you walk.     · When you exercise, watch for signs that your heart is working too hard. You are pushing too hard if you cannot talk while you are exercising. If you become short of breath or dizzy or have chest pain, sit down and rest right away.     · Check your pulse regularly. Place two fingers on the artery at the palm side of your wrist in line with your thumb. If your heartbeat seems uneven or fast, talk to your doctor. Other instructions    · Ask your doctor when you can drive again.     · Do not smoke.  If you need help quitting, talk to your doctor about stop-smoking programs and medicines. These can increase your chances of quitting for good.     · Limit alcohol. Follow-up care is a key part of your treatment and safety. Be sure to make and go to all appointments, and call your doctor if you are having problems. It's also a good idea to know your test results and keep a list of the medicines you take. When should you call for help? Call 911 anytime you think you may need emergency care. For example, call if:    · You passed out (lost consciousness).     · You have chest pain or pressure. This may occur with:  ? Sweating. ? Shortness of breath. ? Nausea or vomiting. ? Pain that spreads from the chest to the neck, jaw, or one or both shoulders or arms. ? A fast or uneven pulse. After calling 911, the  may tell you to chew 1 adult-strength or 2 to 4 low-dose aspirin. Wait for an ambulance. Do not try to drive yourself.     · You have symptoms of a stroke. These may include:  ? Sudden numbness, tingling, weakness, or loss of movement in your face, arm, or leg, especially on only one side of your body. ? Sudden vision changes. ? Sudden trouble speaking. ? Sudden confusion or trouble understanding simple statements. ? Sudden problems with walking or balance. ? A sudden, severe headache that is different from past headaches. Call your doctor now or seek immediate medical care if:    · You feel dizzy or lightheaded, or you feel like you may faint.     · You have a fast or irregular heartbeat. Watch closely for any changes in your health, and be sure to contact your doctor if you have any problems. Where can you learn more? Go to http://www.gray.com/  Enter A617 in the search box to learn more about \"Electrical Cardioversion: What to Expect at Home. \"  Current as of: January 10, 2022               Content Version: 13.2  © 0086-0983 [employer].    Care instructions adapted under license by [employer] (which disclaims liability or warranty for this information). If you have questions about a medical condition or this instruction, always ask your healthcare professional. [address] any warranty or liability for your use of this information.

## 2022-03-10 NOTE — PROGRESS NOTES
Patient is up for discharge. Patient will be getting discharged home with no supportive care needs. Care Management Interventions  PCP Verified by CM: Yes Radha Flynn MD)  Mode of Transport at Discharge:  Other (see comment) (Family)  Transition of Care Consult (CM Consult): Discharge Planning  Physical Therapy Consult: No  Occupational Therapy Consult: No  Speech Therapy Consult: No  Support Systems: Spouse/Significant Other  Confirm Follow Up Transport: Family  The Patient and/or Patient Representative was Provided with a Choice of Provider and Agrees with the Discharge Plan?: Yes  Name of the Patient Representative Who was Provided with a Choice of Provider and Agrees with the Discharge Plan: Dasia Carrillo  Freedom of Choice List was Provided with Basic Dialogue that Supports the Patient's Individualized Plan of Care/Goals, Treatment Preferences and Shares the Quality Data Associated with the Providers?: Yes  [place] Resource Information Provided?: No  Discharge Location  Patient Expects to be Discharged to[de-identified] Home with family assistance

## 2022-03-10 NOTE — PROGRESS NOTES
Bedside and Verbal shift change report given to self (oncoming nurse) by Keron Valencia (offgoing nurse). Report included the following information SBAR, Kardex and MAR.

## 2022-03-10 NOTE — PROGRESS NOTES
[facility] CARDIOLOGY PROGRESS NOTE           3/10/2022 7:46 AM    Admit Date: 3/4/2022      Subjective:   Patient now on room air. He feels better and wishes to be discharged home. Monitor demonstrates sinus rhythm with PACs and PVCs. ROS:  Cardiovascular:  As noted above    Objective:      Vitals:    03/09/22 2258 03/10/22 0323 03/10/22 0400 03/10/22 0718   BP: 116/63 (!) 144/92     Pulse: 72 65     Resp: 20 20     Temp: 98.7 °F (37.1 °C) 98.9 °F (37.2 °C)     SpO2: 93% 92%  93%   Weight:   286 lb 6.4 oz (129.9 kg)    Height:           Physical Exam:  General-No Acute Distress  Neck- supple, no JVD  CV- regular rate and rhythm no MRG  Lung- clear bilaterally  Abd- soft, nontender, nondistended  Ext- no edema bilaterally. Skin- warm and dry    Data Review:   Recent Labs     03/10/22  0441 03/09/22  0659 03/08/22  1042 03/08/22  0355 03/07/22  2319   * 135*  --    < > 141   K 4.1 4.1  --    < > 4.3   MG 2.3 2.3  --    < > 2.0   BUN 18 19  --    < > 18   CREA 1.10 1.20  --    < > 1.50   * 156*  --    < > 173*   WBC  --   --  14.4*  --  11.8*   HGB  --   --  11.4*  --  11.7*   HCT  --   --  34.0*  --  35.1*   PLT  --   --  162  --  150    < > = values in this interval not displayed. Assessment/Plan:     Principal Problem:    Ventricular tachycardia (HCC) (3/4/2022)  Reduce amiodarone to 200 mg twice daily. He is status post ICD with normal function. Active Problems:    Coronary artery disease involving native coronary artery of native heart without angina pectoris (10/12/2011)  Patient with stable coronary artery disease. We will continue medications as outlined per medication reconciliation. HTN (hypertension) (10/12/2011)  Blood pressure is stable. With reduction amiodarone will increase carvedilol to 25 mg twice daily. Continue valsartan 320 mg daily. Severe obesity (BMI 35.0-39. 9) with comorbidity ([facility] [address] (10/12/2011)  Chronic and unchanged      DM (diabetes mellitus) type II, controlled, with peripheral vascular disorder ([facility] [address] (9/11/2014)  Currently managed per hospitalist      COP[facility]ch[facility] obstructive [facility] (4/10/2015)  Stable and improved. Atrial fibrillation ([facility] (3/4/2022)  Patient status post cardioversion now in sinus rhythm. Continue amiodarone 200 mg twice daily and Eliquis 5 mg twice daily. VT (ventricular tachycardia) ([facility] (3/4/2022)  Patient status post ICD. He is on amiodarone 200 mg twice daily. CT suggest multi lobar viral pneumonia. This is managed per hospitalist.  Patient appears stable to be discharged home. Will await hospitalist input.           Fady Naranjo MD  3/10/2022 7:46 AM

## 2022-03-10 NOTE — DISCHARGE SUMMARY
[facility] Cardiology Discharge Summary     Patient ID:  Britni Zambrano  [id number]  67 y.o.  1945    Admit date: 3/4/2022    Discharge date and time:  03/10/22     Admitting Physician: Dahlia Espinoza MD     Discharge Physician: Shelly Crandall NP/Dr. Lashaun Diaz    Admission Diagnoses: VT (ventricular tachycardia) [facility]) [I47.2]    Discharge Diagnoses:   Patient Active Problem List    Diagnosis Date Noted    Chest pain     [facility]     Atrial fibrillation ([facility]     VT [facility]ventricular tachycardia) ([facility]     pneumonitis      Type 2 diabetes with nephropathy ([facility]     Hypoxia     CAD     Obstructive sleep apnea     PAD (peripheral artery disease) ([facility]     COPD (chronic obstructive pulmonary disease) ([facility]     HTN (hypertension)     Severe obesity (BMI 35.0-39. 9) [facility]     LV dysfunction - s/p ICD     ICD  Placement         Cardiology Procedures this admission:  MADDIE/ DCCV. [facility], ICD Implantation, CXR and device interrogation   Consults: EP Cardiology and [facility] Course: Patient with h/o htn, DM, NOMI on CPAP, COPD (severe) (smoked 1 ppd x 50 years quit in ), Covid w long hospitalization  to 3-2021 (had completely recovered), PAD, CAD w [facility]  w PCI to [place] [address], nuke  w EF 40% and moderately abnormal. Patient presented to ED at [facility] with c/o chest pain. He reported going outside to cut up some tree branches, kept pulling the chain saw and couldn't get it to start. His arms got tired and he was very fatigued and went inside to rest.  He drank a glass of water and his arms started hurting and then he developed a dull constant pain across his chest radiating to his arms with diaphoresis but no nausea or SOB. Pain was constant 5/10. He took two  nitro with no relief of pain. No palpitations, dizziness or syncope. No LE edema though his legs hurt with ambulation and [place] Hannah out' quickly.   He checked his vital signs and HR was 220. Wife called 911 and EMS arrived and pt in monomorphic VT. Started IV amio with no improvement and IV lidocaine started and pt converted to a fib. EKG on arrival at ER a fib w rate 95 w QTc 405. He was admitted to tele floor on heparin and amiodarone gtt with plans for Narda Situ. MADDIE/Cardioversion was planned as well. The patient underwent MADDIE that was negative for thrombus and then underwent successful cardioversion from atrial fibrillation to sinus rhythm. Also Left Ventricle: Left ventricle is moderately dilated. Mildly increased wall thickness. Akinesis of the following segments: basal inferior, basal inferolateral, mid inferior, mid inferolateral, apical lateral and apical inferior. Severely reduced left ventricular systolic function with a visually estimated EF of 30 - 35%. Mitral Valve: Mild annular calcification of the mitral valve. Mild transvalvular regurgitation. Left Atrium: Left atrium is moderately dilated. No left atrial appendage thrombus noted. No left atrial appendage mass noted. Right Atrium: Right atrium is moderately dilated. Patient underwent LHC by Dr. Zabrina Ramírez on 3/5/2022 that showed Severe calcified left main LAD with moderate LAD stenosis. Moderate calcification left circumflex with widely patent second obtuse marginal stenting. Chronically occluded right coronary artery with left-to-right collaterals to a small posterior lateral branch. PDA is not well visualized. This remains unchanged from 2018. EP Cardiology was consulted for device therapy. Dr. Carley Arrieta saw patient and patient underwent a BS dual chamber ICD (MRI conditional) without any complications on 8/8/4459 for secondary prevention . Follow up CXR showed no pneumothorax. Patient tolerated the procedure well and was taken to the telemetry floor for recovery. Hospitalist saw patient for PNA and he was started on vanco and zosyn.  Patient had prior h/o covid with multiple Abx in past.  The morning of d/c Hospitalist (Dr. Nadege Rosenthal felt likely pneumonitis and felt patient was safe to d/c home on Augmentin. He was weaned off O2 as well. The morning of 3/10/2022, the patient was up feeling well without any complaints of CP, SOB or palpitations. The patient's device was interrogated prior to discharge showing normal function. Patient's left subclavian PM site was clean, dry and intact without hematoma. Patient's labs were WNL. The right radial site without any bleeding or hematoma. Patient was seen and examined by Dr. Tangela Kenyon and determined stable and ready for discharge. The patient will continue plavix, ARB and BB for stable CAD. His ASA was stopped d/u adding eliquis for PAF. He was also d/c home on amiodarone. The patient will see device clinic on 3/21 at 11 am in [facility]. DISPOSITION: The patient is being discharged home in stable condition on a low saturated fat, low cholesterol and low salt diet. Pt is instructed to advance activities as tolerated limited to fatigue or shortness of breath. Pt is instructed not to lift anything over 5-10 lbs with affected arm. No pushing or pulling or lifting arm above shoulder. See complete discharge instructions. Pt is instructed to watch PM site for bleeding/oozing, if seen Pt is instructed to apply firm pressure with clean cloth and call office at [phone]. Pt is instructed to watch for signs of infection which include increasing area of redness around site, fever/hot to touch or purulent drainage. Pt is instructed to call office or return to ER for immediate evaluation of any shortness of breath, chest pain or palpitations. Discharge Exam:   Visit Vitals  /70 (BP Patient Position: At rest;Sitting)   Pulse 63   Temp 97.9 °F (36.6 °C)   Resp 18   Ht 6' 1\" (1.854 m)   Wt 129.9 kg (286 lb 6.4 oz)   SpO2 92%   BMI 37.79 kg/m²   Pt has been seen by Paul: see his progress note for exam details.     Recent Results (from the past 24 hour(s)) GLUCOSE, POC    Collection Time: 03/09/22 11:05 AM   Result Value Ref Range    Glucose (POC) 182 (H) 65 - 100 mg/dL    [facility] by [facility]    GLUCOSE, POC    Collection Time: 03/09/22  3:35 PM   Result Value Ref Range    Glucose (POC) 193 (H) 65 - 100 mg/dL    Performed by [facility]    GLUCOSE, POC    Collection Time: 03/09/22  9:38 PM   Result Value Ref Range    Glucose (POC) 168 (H) 65 - 100 mg/dL    Performed by [facility]    METABOLIC PANEL, BASIC    Collection Time: 03/10/22  4:41 AM   Result Value Ref Range    Sodium 137 (L) 138 - 145 mmol/L    Potassium 4.1 3.5 - 5.1 mmol/L    Chloride 106 98 - 107 mmol/L    CO2 27 21 - 32 mmol/L    Anion gap 4 (L) 7 - 16 mmol/L    Glucose 137 (H) 65 - 100 mg/dL    BUN 18 8 - 23 MG/DL    Creatinine 1.10 0.8 - 1.5 MG/DL    GFR est AA >60 >60 ml/min/1.73m2    GFR est non-AA >60 >60 ml/min/1.73m2    Calcium 8.6 8.3 - 10.4 MG/DL   MAGNESIUM    Collection Time: 03/10/22  4:41 AM   Result Value Ref Range    Magnesium 2.3 1.8 - 2.4 mg/dL   GLUCOSE, POC    Collection Time: 03/10/22  6:31 AM   Result Value Ref Range    Glucose (POC) 154 (H) 65 - 100 mg/dL    Performed by [facility]    CBC WITH AUTOMATED DIFF    Collection Time: 03/10/22  7:47 AM   Result Value Ref Range    WBC 8.4 4.3 - 11.1 K/uL    RBC 3.28 (L) 4.23 - 5.6 M/uL    HGB 10.6 (L) 13.6 - 17.2 g/dL    HCT 31.8 (L) 41.1 - 50.3 %    MCV 97.0 79.6 - 97.8 FL    MCH 32.3 26.1 - 32.9 PG    MCHC 33.3 31.4 - 35.0 g/dL    RDW 14.8 (H) 11.9 - 14.6 %    PLATELET 559 (L) 885 - 450 K/uL    MPV 10.4 9.4 - 12.3 FL    ABSOLUTE NRBC 0.00 0.0 - 0.2 K/uL    DF AUTOMATED      NEUTROPHILS 79 (H) 43 - 78 %    LYMPHOCYTES 11 (L) 13 - 44 %    MONOCYTES 8 4.0 - 12.0 %    EOSINOPHILS 1 0.5 - 7.8 %    BASOPHILS 0 0.0 - 2.0 %    IMMATURE GRANULOCYTES 1 0.0 - 5.0 %    ABS. NEUTROPHILS 6.7 1.7 - 8.2 K/UL    ABS. LYMPHOCYTES 0.9 0.5 - 4.6 K/UL    ABS. MONOCYTES 0.7 0.1 - 1.3 K/UL    ABS.  EOSINOPHILS 0.1 0.0 - 0.8 K/UL ABS. BASOPHILS 0.0 0.0 - 0.2 K/UL    ABS. IMM. GRANS. 0.1 0.0 - 0.5 K/UL         Patient Instructions:     Current Discharge Medication List      START taking these medications    Details   amiodarone (CORDARONE) 200 mg tablet Take 1 Tablet by mouth two (2) times a day. Qty: 60 Tablet, Refills: 5  Start date: 3/10/2022      amoxicillin-clavulanate (AUGMENTIN) 875-125 mg per tablet Take 1 Tablet by mouth every twelve (12) hours for 10 days. Qty: 20 Tablet, Refills: 0  Start date: 3/10/2022, End date: 3/20/2022      apixaban (ELIQUIS) 5 mg tablet Take 1 Tablet by mouth every twelve (12) hours. Qty: 60 Tablet, Refills: 11  Start date: 3/10/2022      carvediloL (COREG) 25 mg tablet Take 1 Tablet by mouth two (2) times daily (with meals). Qty: 60 Tablet, Refills: 5  Start date: 3/10/2022      HYDROcodone-acetaminophen (NORCO) 7.5-325 mg per tablet Take 1 Tablet by mouth every six (6) hours as needed for Pain for up to 3 days. Max Daily Amount: 4 Tablets. Qty: 10 Tablet, Refills: 0  Start date: 3/10/2022, End date: 3/13/2022    Associated Diagnoses: ICD (implantable cardioverter-defibrillator) in place      valsartan (DIOVAN) 320 mg tablet Take 1 Tablet by mouth daily. Qty: 30 Tablet, Refills: 5  Start date: 3/10/2022         CONTINUE these medications which have NOT CHANGED    Details   latanoprost (XALATAN) 0.005 % ophthalmic solution Administer 1 Drop to both eyes nightly. Indications: wide-angle glaucoma      fluticasone propion-salmeteroL (Advair Diskus) 500-50 mcg/dose diskus inhaler Take 1 Puff by inhalation two (2) times a day.  RINSE MOUTH WELL AFTER USE  Qty: 1 Each, Refills: 11      rosuvastatin (CRESTOR) 10 mg tablet TAKE ONE TABLET BY MOUTH ONCE A DAY  Qty: 90 Tablet, Refills: 1    Comments: * * N O T I C E * * Last quantity doesn't match original quantity      furosemide (LASIX) 40 mg tablet TAKE 1 TABLET BY MOUTH EVERY DAY  Qty: 30 Tablet, Refills: 4      cilostazoL (PLETAL) 50 mg tablet TAKE 1 TABLET BY MOUTH TWICE DAILY. Qty: 180 Tablet, Refills: 1    Comments: * * N O T I C E * * Last quantity doesn't match original quantity      albuterol (PROVENTIL HFA, VENTOLIN HFA, PROAIR HFA) 90 mcg/actuation inhaler USE 2 PUFFS BY INHALATION 4 TIMES DAILY AS NEEDED FOR WHEEZING OR SHORTNESS OF BREATH. Qty: 18 g, Refills: 10      magnesium oxide (MAG-OX) 400 mg tablet TAKE 1 TABLET BY MOUTH EVERY DAY  Qty: 90 Tablet, Refills: 3    Comments: * * N O T I C E * * Last quantity doesn't match original quantity  Associated Diagnoses: Hypomagnesemia      glipiZIDE SR (GLUCOTROL XL) 10 mg CR tablet TAKE 1 TABLET BY MOUTH TWICE DAILY  Qty: 60 Tablet, Refills: 11    Associated Diagnoses: Type 2 diabetes mellitus with hyperglycemia, without long-term current use of insulin ([facility])      polyethylene glycol (MIRALAX) 17 gram packet Take 1 Packet by mouth daily.   Qty: 1 Each, Refills: 0      montelukast (SINGULAIR) 10 mg tablet TAKE 1 TABLET BY MOUTH EVERY DAY AT BEDTIME  Qty: 90 Tablet, Refills: 2    Comments: * * N O T I C E * * Last quantity doesn't match original quantity      fluticasone propionate (FLONASE) 50 mcg/actuation nasal spray USE 1 OR 2 SPRAYS IN EACH NOSTRIL EVERY DAY  Qty: 48 g, Refills: 2    Comments: PATIENT NEEDS A NEW RX FOR FLUTICASONE SPRAY 50MCG, PETRA HUSAIN [facility]      metFORMIN (GLUCOPHAGE) 500 mg tablet TAKE TWO TABLETS BY MOUTH 2 TIMES A DAY  Qty: 120 Tab, Refills: 11    Comments: PATIENT NEEDS A NEW RX FOR METFORMIN 500MG, PETRA HUSAIN [facility]      Januvia 100 mg tablet TAKE ONE TABLET BY MOUTH EVERY DAY  Qty: 30 Tab, Refills: 11      glucose blood VI test strips (ASCENSIA AUTODISC VI, ONE TOUCH ULTRA TEST VI) strip E11.9  Qty: 100 Strip, Refills: 11    Associated Diagnoses: Controlled type 2 diabetes mellitus without complication, with long-term current use of insulin ([facility])      clopidogreL (PLAVIX) 75 mg tab TAKE ONE TABLET BY MOUTH EVERY DAY  Qty: 90 Tab, Refills: 3    Comments: * * N O T I C E * * Last quantity doesn't match original quantity      guaiFENesin (MUCINEX) 1,200 mg Ta12 ER tablet Take 1,200 mg by mouth two (2) times daily as needed. ascorbic acid, vitamin C, (VITAMIN C) 500 mg tablet Take 500 mg by mouth nightly. glucosamine/chondr funez A sod (GLUCOSAMINE-CHONDROITIN) 750-600 mg tab Take  by mouth two (2) times a day. cholecalciferol, vitamin D3, (VITAMIN D3) 2,000 unit tab Take 2,000 Units by mouth nightly. FISH OIL CONCENTRATE 1,000 mg cap Take 1-2 Caps by mouth two (2) times a day. One tab in am & 2 tabs in pm      nitroglycerin (NITROSTAT) 0.4 mg SL tablet 0.4 mg by SubLINGual route every five (5) minutes as needed for Chest Pain.      garlic 3,477 mg cap Take 1 Tab by mouth two (2) times a day. MULTIVITS-MINERALS/FA/LYCOPENE (ONE-A-DAY MEN'S MULTIVITAMIN PO) Take 1 Tab by mouth daily. cpap machine kit Take  by inhalation.  qhs         STOP taking these medications       amLODIPine-valsartan (EXFORGE)  mg per tablet Comments:   Reason for Stopping:         hydroCHLOROthiazide (HYDRODIURIL) 25 mg tablet Comments:   Reason for Stopping:         trimethoprim-sulfamethoxazole (BACTRIM DS, SEPTRA DS) 160-800 mg per tablet Comments:   Reason for Stopping:         aspirin 81 mg tablet Comments:   Reason for Stopping:                 Signed:  Rolly Bansal NP  3/10/2022  8:38 AM

## 2022-03-10 NOTE — PROGRESS NOTES
Discharge instructions reviewed with ptient. Prescriptions given for sent to pharmacy and med info sheets provided for all new medications. Opportunity for questions provided. patient voiced understanding of all discharge instructions.

## 2022-03-10 NOTE — PROGRESS NOTES
Verbal bedside report given to oncoming RN, Kimo Limon. Patient's situation, background, assessment and recommendations provided. Opportunity for questions provided. Oncoming RN assumed care of patient.

## 2022-03-10 NOTE — PROGRESS NOTES
Problem: Falls - Risk of  Goal: *Absence of Falls  Description: Document Hudsoncarley German Fall Risk and appropriate interventions in the flowsheet. Outcome: Resolved/Met  Note: Fall Risk Interventions:  Mobility Interventions: Patient to call before getting OOB         Medication Interventions: Patient to call before getting OOB    Elimination Interventions: Call light in reach              Problem: Pain  Goal: *Control of Pain  Outcome: Resolved/Met     Problem: Pressure Injury - Risk of  Goal: *Prevention of pressure injury  Description: Document Hemant Scale and appropriate interventions in the flowsheet.   Outcome: Resolved/Met  Note: Pressure Injury Interventions:       Moisture Interventions: Maintain skin hydration (lotion/cream)    Activity Interventions: Increase time out of bed    Mobility Interventions: Pressure redistribution bed/mattress (bed type)    Nutrition Interventions: Document food/fluid/supplement intake

## 2022-03-18 PROBLEM — I48.91 ATRIAL FIBRILLATION (HCC): Status: ACTIVE | Noted: 2022-03-04

## 2022-03-18 PROBLEM — R77.8 ELEVATED TROPONIN: Status: ACTIVE | Noted: 2020-12-31

## 2022-03-18 PROBLEM — R79.89 ELEVATED TROPONIN: Status: ACTIVE | Noted: 2020-12-31

## 2022-03-19 PROBLEM — I47.20 VT (VENTRICULAR TACHYCARDIA) (HCC): Status: ACTIVE | Noted: 2022-03-04

## 2022-03-19 PROBLEM — F17.211 CIGARETTE NICOTINE DEPENDENCE IN REMISSION: Status: ACTIVE | Noted: 2018-08-20

## 2022-03-19 PROBLEM — M25.561 CHRONIC PAIN OF RIGHT KNEE: Status: ACTIVE | Noted: 2017-12-14

## 2022-03-19 PROBLEM — G93.41 ACUTE METABOLIC ENCEPHALOPATHY: Status: ACTIVE | Noted: 2021-05-12

## 2022-03-19 PROBLEM — G89.29 CHRONIC PAIN OF RIGHT KNEE: Status: ACTIVE | Noted: 2017-12-14

## 2022-03-19 PROBLEM — I49.9 IRREGULAR HEART BEAT: Status: ACTIVE | Noted: 2021-03-02

## 2022-03-19 PROBLEM — G47.33 OBSTRUCTIVE SLEEP APNEA: Status: ACTIVE | Noted: 2017-08-11

## 2022-03-19 PROBLEM — G95.19 INTERMITTENT SPINAL CLAUDICATION (HCC): Status: ACTIVE | Noted: 2017-06-13

## 2022-03-19 PROBLEM — I47.20 VENTRICULAR TACHYCARDIA (HCC): Status: ACTIVE | Noted: 2022-03-04

## 2022-03-19 PROBLEM — R07.9 CHEST PAIN: Status: ACTIVE | Noted: 2018-12-05

## 2022-03-19 PROBLEM — E11.21 TYPE 2 DIABETES WITH NEPHROPATHY (HCC): Status: ACTIVE | Noted: 2019-06-17

## 2022-03-19 PROBLEM — S91.119A TOE LACERATION: Status: ACTIVE | Noted: 2019-12-09

## 2022-03-20 PROBLEM — A41.9 SEPSIS (HCC): Status: ACTIVE | Noted: 2019-02-08

## 2022-03-20 PROBLEM — R93.1 ABNORMAL NUCLEAR CARDIAC IMAGING TEST: Status: ACTIVE | Noted: 2018-11-27

## 2022-03-20 PROBLEM — R09.02 HYPOXIA: Status: ACTIVE | Noted: 2019-02-08

## 2022-03-20 PROBLEM — I49.3 PVC'S (PREMATURE VENTRICULAR CONTRACTIONS): Status: ACTIVE | Noted: 2021-03-02

## 2022-04-13 NOTE — PROGRESS NOTES
Pt admitted to floor     Placed on monitor // all lines assessed     VSS stable / temp 99.4 aux    skin assessment preformed / WNL / brief in place --> will place condom cath     75% on CPAP RT at bedside %    Pt alert x3 following commands / talkative and short of breath     Will continue to monitor Detail Level: Simple Quality 130: Documentation Of Current Medications In The Medical Record: Current Medications Documented

## 2022-05-26 ENCOUNTER — TELEPHONE (OUTPATIENT)
Dept: INTERNAL MEDICINE CLINIC | Facility: CLINIC | Age: 77
End: 2022-05-26

## 2022-05-26 DIAGNOSIS — M54.50 CHRONIC LOW BACK PAIN, UNSPECIFIED BACK PAIN LATERALITY, UNSPECIFIED WHETHER SCIATICA PRESENT: Primary | ICD-10-CM

## 2022-05-26 DIAGNOSIS — G89.29 CHRONIC LOW BACK PAIN, UNSPECIFIED BACK PAIN LATERALITY, UNSPECIFIED WHETHER SCIATICA PRESENT: Primary | ICD-10-CM

## 2022-05-26 RX ORDER — TRAMADOL HYDROCHLORIDE 50 MG/1
50 TABLET ORAL EVERY 8 HOURS PRN
Qty: 30 TABLET | Refills: 0 | Status: SHIPPED | OUTPATIENT
Start: 2022-05-26 | End: 2022-06-05

## 2022-05-26 NOTE — TELEPHONE ENCOUNTER
Spoke with [facility] - they called pt to schedule but did not get in touch with him.   Called pt and gave the # to schedule the MRI @[phone]

## 2022-05-26 NOTE — TELEPHONE ENCOUNTER
Patient called and he needs a refill on his tramadol sent to the [facility]. He is scheduled for his MRI next week.

## 2022-05-26 NOTE — TELEPHONE ENCOUNTER
Pt called. His back pain is worse. He said he was seen 2 weeks ago and was supposed to be sent for MRI . He has not heard from the hospital. Pt said he will go to [facility] or [facility] whichever one that can do it soon. He also wants to be referred to a back specialist or pain management for back pain.  awb

## 2022-06-02 ENCOUNTER — HOSPITAL ENCOUNTER (OUTPATIENT)
Dept: MRI IMAGING | Age: 77
Discharge: HOME | End: 2022-06-05
Payer: MEDICARE

## 2022-06-02 DIAGNOSIS — G89.29 CHRONIC LOW BACK PAIN WITH SCIATICA, SCIATICA LATERALITY UNSPECIFIED, UNSPECIFIED BACK PAIN LATERALITY: ICD-10-CM

## 2022-06-02 DIAGNOSIS — M54.40 CHRONIC LOW BACK PAIN WITH SCIATICA, SCIATICA LATERALITY UNSPECIFIED, UNSPECIFIED BACK PAIN LATERALITY: ICD-10-CM

## 2022-06-02 PROCEDURE — 72148 MRI LUMBAR SPINE W/O DYE: CPT

## 2022-06-03 ENCOUNTER — TELEPHONE (OUTPATIENT)
Dept: INTERNAL MEDICINE CLINIC | Facility: CLINIC | Age: 77
End: 2022-06-03

## 2022-06-03 DIAGNOSIS — S32.020S COMPRESSION FRACTURE OF L2 VERTEBRA, SEQUELA: Primary | ICD-10-CM

## 2022-06-03 RX ORDER — CALCITONIN SALMON 200 [IU]/.09ML
1 SPRAY, METERED NASAL DAILY
Qty: 1 EACH | Refills: 1 | Status: SHIPPED | OUTPATIENT
Start: 2022-06-03 | End: 2022-10-04 | Stop reason: ALTCHOICE

## 2022-06-03 NOTE — TELEPHONE ENCOUNTER
Mr. Jose Luis Tellez-  Your MRI shows 2 compression fractures as well as disc disease. I sent your results to Dr. Rohith Amos and we recommend seeing Dr. Evan Patrick, an orthopedist, for possible injections to help with pain or other therapy. I have placed this referral today, please let us know if you don't hear from them in a timely manner. How is your pain level? I called in a nasal medication to help bone healing and with the pain. Please schedule follow up in 3 months with me, will need bone density studies and oral bisphosphonates. Janay    Please facilitate referral, send MRI with referral. Thanks!

## 2022-06-16 ENCOUNTER — OFFICE VISIT (OUTPATIENT)
Dept: CARDIOLOGY CLINIC | Age: 77
End: 2022-06-16
Payer: MEDICARE

## 2022-06-16 VITALS
HEIGHT: 73 IN | DIASTOLIC BLOOD PRESSURE: 70 MMHG | HEART RATE: 60 BPM | SYSTOLIC BLOOD PRESSURE: 125 MMHG | WEIGHT: 273 LBS | BODY MASS INDEX: 36.18 KG/M2

## 2022-06-16 DIAGNOSIS — I10 PRIMARY HYPERTENSION: ICD-10-CM

## 2022-06-16 DIAGNOSIS — I25.5 ISCHEMIC CARDIOMYOPATHY: Primary | ICD-10-CM

## 2022-06-16 DIAGNOSIS — I47.20 VENTRICULAR TACHYCARDIA: ICD-10-CM

## 2022-06-16 DIAGNOSIS — I48.0 PAROXYSMAL ATRIAL FIBRILLATION (HCC): ICD-10-CM

## 2022-06-16 DIAGNOSIS — Z95.810 ICD (IMPLANTABLE CARDIOVERTER-DEFIBRILLATOR) IN PLACE: ICD-10-CM

## 2022-06-16 DIAGNOSIS — I25.10 CORONARY ARTERY DISEASE INVOLVING NATIVE CORONARY ARTERY OF NATIVE HEART WITHOUT ANGINA PECTORIS: ICD-10-CM

## 2022-06-16 PROCEDURE — 4004F PT TOBACCO SCREEN RCVD TLK: CPT | Performed by: INTERNAL MEDICINE

## 2022-06-16 PROCEDURE — 99214 OFFICE O/P EST MOD 30 MIN: CPT | Performed by: INTERNAL MEDICINE

## 2022-06-16 PROCEDURE — G8417 CALC BMI ABV UP PARAM F/U: HCPCS | Performed by: INTERNAL MEDICINE

## 2022-06-16 PROCEDURE — G8427 DOCREV CUR MEDS BY ELIG CLIN: HCPCS | Performed by: INTERNAL MEDICINE

## 2022-06-16 PROCEDURE — 1123F ACP DISCUSS/DSCN MKR DOCD: CPT | Performed by: INTERNAL MEDICINE

## 2022-06-16 ASSESSMENT — ENCOUNTER SYMPTOMS
COLOR CHANGE: 0
SHORTNESS OF BREATH: 0
HEMOPTYSIS: 0
ORTHOPNEA: 0
HEMATEMESIS: 0
BACK PAIN: 1
HOARSE VOICE: 0
NAUSEA: 0
SPUTUM PRODUCTION: 0
HEMATOCHEZIA: 0
COUGH: 0
BLURRED VISION: 0
DIARRHEA: 0
WHEEZING: 0
ABDOMINAL PAIN: 0
VOMITING: 0
BOWEL INCONTINENCE: 0

## 2022-06-27 ENCOUNTER — NURSE ONLY (OUTPATIENT)
Dept: CARDIOLOGY CLINIC | Age: 77
End: 2022-06-27
Payer: MEDICARE

## 2022-06-27 ENCOUNTER — OFFICE VISIT (OUTPATIENT)
Dept: CARDIOLOGY CLINIC | Age: 77
End: 2022-06-27
Payer: MEDICARE

## 2022-06-27 VITALS
HEART RATE: 64 BPM | SYSTOLIC BLOOD PRESSURE: 112 MMHG | HEIGHT: 73 IN | DIASTOLIC BLOOD PRESSURE: 58 MMHG | BODY MASS INDEX: 35.52 KG/M2 | WEIGHT: 268 LBS

## 2022-06-27 DIAGNOSIS — I25.5 ISCHEMIC CARDIOMYOPATHY: Primary | ICD-10-CM

## 2022-06-27 DIAGNOSIS — I47.20 VENTRICULAR TACHYCARDIA: ICD-10-CM

## 2022-06-27 DIAGNOSIS — I47.20 VT (VENTRICULAR TACHYCARDIA): Primary | ICD-10-CM

## 2022-06-27 DIAGNOSIS — I48.0 PAROXYSMAL ATRIAL FIBRILLATION (HCC): ICD-10-CM

## 2022-06-27 PROCEDURE — 4004F PT TOBACCO SCREEN RCVD TLK: CPT | Performed by: INTERNAL MEDICINE

## 2022-06-27 PROCEDURE — 99214 OFFICE O/P EST MOD 30 MIN: CPT | Performed by: INTERNAL MEDICINE

## 2022-06-27 PROCEDURE — 1123F ACP DISCUSS/DSCN MKR DOCD: CPT | Performed by: INTERNAL MEDICINE

## 2022-06-27 PROCEDURE — G8427 DOCREV CUR MEDS BY ELIG CLIN: HCPCS | Performed by: INTERNAL MEDICINE

## 2022-06-27 PROCEDURE — G8417 CALC BMI ABV UP PARAM F/U: HCPCS | Performed by: INTERNAL MEDICINE

## 2022-06-27 ASSESSMENT — ENCOUNTER SYMPTOMS
EYES NEGATIVE: 1
GASTROINTESTINAL NEGATIVE: 1
RESPIRATORY NEGATIVE: 1
BACK PAIN: 1
ALLERGIC/IMMUNOLOGIC NEGATIVE: 1

## 2022-06-27 NOTE — PROGRESS NOTES
[facility] CARDIOLOGY  [address], [address]  PHONE: [phone]        22      NAME:  Sabino Pereira  : 1945  MRN: [id number]     Referring Cardiologist: Pilar Grossman MD and Annemarie Clarke MD    Reason for Consultation: Ventricular tachycardia       ASSESSMENT and PLAN:   Diagnoses and all orders for this visit:      1. Ventricular tachycardia ([facility] s/p 2nd prevention ICD, [facility], 3/2022.       2. Atrial fibrillation with RVR ([facility]      3. Chronic obstructive pulmonary disease, unspecified COPD type ([facility]      4. Coronary artery disease involving native coronary artery of native heart without angina pectoris      5. DM (diabetes mellitus) type II, controlled, with peripheral vascular disorder ([facility]      6. Primary hypertension      7. Chest pain      8. HFrEF (heart failure with reduced ejection fraction) ([facility]     67year old male with ICM, EF 30-35%, NYHA class II with VT s/p secondary prevention ICD implant. -VT - s/p DC ICD. Stable function. Interrogated performed today.    -Continue amiodarone - biannual TFTs, LFTs, and yearly PFTs and eye exams. -ICM - continue GDMT.    -DMII - A1c 5.5. Stable. -Cataracts - s/p eye surgery. Patient has been instructed and agrees to call our office with any issues or other concerns related to their cardiac condition(s) and/or complaint(s). No follow-up provider specified. Thank you for allowing me to participate in the electrophysiologic care of Mr. Sabino Pereira. Please contact me if any questions or concerns were to arise. Breann Olmedo MD, MS  Clinical Cardiac Electrophysiology  [facility] Cardiology  22  11:37 AM    ===================================================================  Chief Complant:    Chief Complaint   Patient presents with    Atrial Fibrillation     3 month        Consultation is requested by No ref.  provider found for evaluation of Atrial Fibrillation (3 month)    History:  Kandi Edmond is a most pleasant 65 y.o. male with a past medical and cardiac history significant for HTN, DM, SHERI on CPAP, COPD  (severe) (smoked 1 ppd x 50 years quit in 2011), Covid w long hospitalization  to 3-2021 (had completely recovered), PAD, CAD w [facility]  w PCI to [facility], nuke  w EF 40% and moderately abnormal. No h/o CVA, TIA or bleeding. He presented with  chest pain and underwent LHC without intervention and stable disease. He had been doing well, went outside to cut up some tree branches, kept pulling the chain saw and couldn't get it to start. His arms got tired and he was very fatigued and went inside  to rest.  He drank a glass of water and his arms started hurting and then he developed a dull constant pain across his chest radiating to his arms with diaphoresis but no nausea or SOB. EMS performed ECG demonstrating MMVT. He had a secondary prevention ICD implanted. He was in AF at that time as well. He comes in for follow up. He is doing well. He did fall recently and had a compression fracture. He denies ICD shocks. The patient otherwise denies chest pain, dyspnea, presyncope, syncope or lateralizing symptoms.       Prior cardiac eval:   CAD w [facility]  w PCI to OM1   nuke  w EF 40% and moderately abnormal       SH: smoked 1 ppd x 50 years quit in 2011   FH: Father w MI around age 76   Covid: Vax x 2 w booster       Cardiac PMH: (Old records have been reviewed and summarized below)      EKG:  (EKG has been independently visualized by me with interpretation below): NSR, FDAVB , normal axis, PACs. ECHO: 3/5/2022     Left Ventricle: Left ventricle is moderately dilated. Mildly increased wall thickness. Akinesis of the following segments: basal inferior, basal inferolateral,  mid inferior, mid inferolateral, apical lateral and apical inferior. Severely reduced left ventricular systolic function with a visually estimated EF of 30 - 35%.     Mitral Valve: Mild annular calcification of the mitral valve. Mild transvalvular regurgitation.   Left Atrium: Left atrium is moderately dilated. No left atrial appendage thrombus noted. No left atrial appendage mass noted.   Right Atrium: Right atrium is moderately dilated.   Successful cardioversion with restoration of sinus rhythm with PACs and PVCs      Previous Heart Catheterization: 3/5/2022  ·   Patient with bilateral lower extremity bypass with extensive scar tissue and severe morbid obesity. Initial attempts at left radial  access due to poor Bill's on right were unsuccessful. Ultrasound-guided access was utilized. Unable to advance the wire past the mid forearm. Left radial access was aborted. Ultrasound guidance was utilized right radial access was obtained. The procedure  was performed via right radial. Pneumatic band hemostasis  ·   Dilated left ventricle with severe LV systolic dysfunction severely elevated end-diastolic pressure consistent with acute on chronic systolic heart failure. ·   Severe calcified left main LAD with moderate LAD stenosis. ·   Moderate calcification left circumflex with widely patent second obtuse marginal stenting. ·   Chronically occluded right coronary artery with left-to-right collaterals to a small posterior lateral branch. PDA is not well visualized. This remains unchanged from 2018. Stress Test: 11/2018   CONCLUSION:   1. Stress EKG: Normal.   2. SPECT Perfusion Imaging: Normal Perfusion. 3. LV Systolic Function is  moderately abnormal. There is TID noted which   is c/w with multivessel disease with balanced ischemia   4. Risk Assessment:  Moderate to high risk       DEVICE INTERROGATION: [facility] [place] [place], implanted 3/2022. Stable lead function. NSVT noted. Past Medical History, Past Surgical History, Family history, Social History, and Medications were all reviewed with the patient today and updated as necessary.      Current Outpatient Medications Medication Sig Dispense Refill    calcitonin (MIACALCIN) 200 UNIT/ACT nasal spray 1 spray by Nasal route daily 1 each 1    albuterol sulfate  (90 Base) MCG/ACT inhaler USE 2 PUFFS BY INHALATION 4 TIMES DAILY AS NEEDED FOR WHEEZING OR SHORTNESS OF BREATH.  amiodarone (CORDARONE) 200 MG tablet Take 200 mg by mouth 2 times daily      apixaban (ELIQUIS) 5 MG TABS tablet Take 5 mg by mouth every 12 hours      ascorbic acid (VITAMIN C) 500 MG tablet Take 500 mg by mouth      carvedilol (COREG) 25 MG tablet Take 25 mg by mouth 2 times daily (with meals)      Cholecalciferol 50 MCG (2000 UT) TABS Take 2,000 Units by mouth      clopidogrel (PLAVIX) 75 MG tablet Take 75 mg by mouth daily      fluticasone (FLONASE) 50 MCG/ACT nasal spray USE 1 OR 2 SPRAYS IN EACH NOSTRIL EVERY DAY.  furosemide (LASIX) 40 MG tablet Take 20 mg by mouth daily TAKE 1 TABLET BY MOUTH EVERY DAY      glipiZIDE (GLUCOTROL XL) 10 MG extended release tablet TAKE 1 TABLET BY MOUTH TWICE DAILY      glucosamine-chondroitin 750-600 MG TABS tablet Take by mouth 2 times daily      guaiFENesin 1200 MG TB12 Take 1,200 mg by mouth 2 times daily as needed      latanoprost (XALATAN) 0.005 % ophthalmic solution Apply 1 drop to eye      magnesium oxide (MAG-OX) 400 (240 Mg) MG tablet TAKE 1 TABLET BY MOUTH EVERY DAY      metFORMIN (GLUCOPHAGE) 500 MG tablet TAKE TWO TABLETS BY MOUTH 2 TIMES A DAY      montelukast (SINGULAIR) 10 MG tablet TAKE 1 TABLET BY MOUTH EVERY DAY AT BEDTIME      nitroGLYCERIN (NITROSTAT) 0.4 MG SL tablet Place 0.4 mg under the tongue      polyethylene glycol (GLYCOLAX) 17 GM/SCOOP powder Take 17 g by mouth daily      rosuvastatin (CRESTOR) 10 MG tablet TAKE ONE TABLET BY MOUTH ONCE A DAY      SITagliptin (JANUVIA) 100 MG tablet TAKE 1 TABLET BY MOUTH EVERY DAY      valsartan (DIOVAN) 320 MG tablet Take 320 mg by mouth daily       No current facility-administered medications for this visit.      Allergies Allergen Reactions    Azithromycin Hives    Oxaprozin Other (See Comments)     ELEVATED BLOOD PRESSURE       Past Medical History:   Diagnosis Date    Acute respiratory failure with hypoxia ([address] 10/23/2014    MINI (acute kidney injury) ([address] 9/15/2014    MINI (acute kidney injury) ([address]     MINI (acute kidney injury) ([address]     Allergic rhinitis 11/10/2015    Asthma 11/10/2015    Benign essential hypertension 11/10/2015    Benign neoplasm of colon 11/10/2015    CAD (coronary artery disease) 11/10/2015    CAD (coronary artery disease) 1998, 1999    mix2, 3 stents [id number]    CAP (community acquired pneumonia) 12/31/2020    Cardiomyopathy ([address] 20/18/8661    Complicated wound infection 11/10/2015    COPD (chronic obstructive pulmonary disease) with emphysema ([address] 11/10/2015    COPD exacerbation ([address] 10/12/2011    COVID-19 12/31/2020    Diabetes mellitus type 2, controlled ([address] 11/10/2015    Diabetic neuropathy ([address] 11/10/2015    Disruption of wound, unspecified 10/9/2014    Encounter for long-term (current) use of other high-risk medications 11/10/2015    Extremity atherosclerosis with intermittent claudication ([address] 11/10/2015    H/O endarterectomy 11/10/2015    Hiatal hernia 11/10/2015    Hyperglycemia due to type 1 diabetes mellitus ([address] 11/10/2015    Hyperlipidemia 11/10/2015    ICD (implantable cardioverter-defibrillator) in place 6/16/2022    Monomorphic ventricular tachycardia ([address] 12/31/2020    Myocardial infarction ([address] 1999    Myocardial infarction ([address] 11/10/2015    Obesity 11/10/2015    Orthostatic hypotension 11/10/2015    Osteoarthritis 11/10/2015    Peripheral vascular disease ([address] 11/10/2015    PNA (pneumonia) 2/8/2019    PVC (premature ventricular contraction)     Rash 13/69/8998    Seroma complicating a procedure 10/2/2014    Sleep apnea     cpap    Toe laceration     Type 2 diabetes with nephropathy ([address]     Uncontrolled type 2 diabetes mellitus ([facility] 11/10/2015    Vertiginous syndromes and other disorders of vestibular system 11/10/2015     Past Surgical History:   Procedure Laterality Date    CARDIAC CATHETERIZATION  12/05/2018    LV EF=40%. LM:nl. LAD:30-40% mid stenosis. LCX OM1:95% stenosis. RCA:100% occlusion at RV branch.  CORONARY ANGIOPLASTY WITH STENT PLACEMENT  12/05/2018    LCX OM1:2.5 x 12 [place] RAKESH    CORONARY ANGIOPLASTY WITH STENT PLACEMENT  1999    [place] ABDOMEN SURGERY [facility]    abdominal cyst removed    SC CARDIAC SURG PROCEDURE UNLIST  1999    stents x3    VASCULAR SURGERY  11/5/14    Repair of right common femoral artery     VASCULAR SURGERY  9/11/14    tiffanie femoral endarterectomy     Family History   Problem Relation Age of Onset    Breast Cancer Mother     Hypertension Mother     Other Father         DIVERTICULITIS    Crohn's Disease Sister     Lung Disease Brother         mesothioloma    Diabetes Sister     Heart Attack Father     Heart Disease Father     Stroke Mother      Social History     Tobacco Use    Smoking status: Former Smoker     Packs/day: 1.00     Quit date: 10/2/2011     Years since quitting: 10.7    Smokeless tobacco: Current User   Substance Use Topics    Alcohol use: Yes     Alcohol/week: 0.0 standard drinks     ROS:  A comprehensive review of systems was performed with the pertinent positives and negatives as noted in the HPI in addition to:  Review of Systems   Constitutional: Negative. HENT: Negative. Eyes: Negative. Respiratory: Negative. Cardiovascular: Negative. Gastrointestinal: Negative. Endocrine: Negative. Genitourinary: Negative. Musculoskeletal: Positive for back pain. Skin: Negative. Allergic/Immunologic: Negative. Neurological: Negative. Hematological: Negative. Psychiatric/Behavioral: Negative. All other systems reviewed and are negative.     PHYSICAL EXAM:   BP (!) 112/58   Pulse 64   Ht 6' 1\" (1.854 m)   Wt 268 lb (121.6 kg)   BMI 35.36 kg/m²      Wt Readings from Last 3 Encounters:   06/27/22 268 lb (121.6 kg)   06/16/22 273 lb (123.8 kg)   05/18/22 279 lb (126.6 kg)     BP Readings from Last 3 Encounters:   06/27/22 (!) 112/58   06/16/22 125/70   05/18/22 (!) 155/76       Gen: Well appearing, well developed, no acute distress  Eyes: Pupils equal, round. Extraocular movements are intact  ENT: Oropharynx clear, no oral lesions, normal dentition  CV: S1S2, regular rate and rhythm, no murmurs, rubs or gallops, normal JVD, no carotid bruits, normal distal pulses, no JASMIN, left sided CIED C/D/I. Pulm: Clear to auscultation bilaterally, no accessory muscle uses, no wheezes or rales  GI: Soft, NT, ND, +BS  Neuro: Alert and oriented, nonfocal  Psych: Appropriate affect  Skin: Normal color and skin turgor  MSK: Normal muscle bulk and tone    Medical problems and test results were reviewed with the patient today. No results found for any visits on 06/27/22.

## 2022-06-28 ENCOUNTER — OFFICE VISIT (OUTPATIENT)
Dept: ORTHOPEDIC SURGERY | Age: 77
End: 2022-06-28
Payer: MEDICARE

## 2022-06-28 DIAGNOSIS — M80.08XA AGE-RELATED OSTEOPOROSIS WITH CURRENT PATHOLOGICAL FRACTURE OF VERTEBRA, INITIAL ENCOUNTER (HCC): Primary | ICD-10-CM

## 2022-06-28 PROCEDURE — 1123F ACP DISCUSS/DSCN MKR DOCD: CPT | Performed by: ORTHOPAEDIC SURGERY

## 2022-06-28 PROCEDURE — G8427 DOCREV CUR MEDS BY ELIG CLIN: HCPCS | Performed by: ORTHOPAEDIC SURGERY

## 2022-06-28 PROCEDURE — 99204 OFFICE O/P NEW MOD 45 MIN: CPT | Performed by: ORTHOPAEDIC SURGERY

## 2022-06-28 PROCEDURE — 4004F PT TOBACCO SCREEN RCVD TLK: CPT | Performed by: ORTHOPAEDIC SURGERY

## 2022-06-28 PROCEDURE — G8417 CALC BMI ABV UP PARAM F/U: HCPCS | Performed by: ORTHOPAEDIC SURGERY

## 2022-06-28 NOTE — LETTER
Nate Wiseman  1945  65 y.o.  [id number]    Diagnosis Code:                              RT                  LT                  BILAT    DDDZ   LBP   L/S NEURITIS   L SPRAIN   SPONDY   L STENOSIS   L DISC   POST CRUZ   CRONIC PAIN    SCOLI   LIMB PAIN   TH PAIN   SI JOINT   C DDDZ   C STENOSIS   C DISC   BRACH NEUR   NECK PAIN    CTS   COCCYX   OSTEO   COMP FX   HIP BURS   POST FUS   POST NON   SH PAIN   ARM PAIN    OTHER____________________________________________________________      Radiographic Studies:    CERV/ THORACIC/ LUMBAR MRI       WITH/  W/O  Contrast              _____________________Discogram    CT /Myelogram of _______________                  NCS/EMG  Upper / Lower Extremity________________    MRI of _______________________                     Other______________________    Injections:     ________________________ESI/ SNRB/ FACET                     _______________________Rhizotomies     Authorization to stop blood thinners: _____________________________________________________      Medications:    __________________Sterpred DS                                    ___________________Gabapentin QD/ BID/ TID    __________________Norco/Tramadol   QD / BID / TID    ____________________NSAIDS  QD / BID / TID    __________________Flexeril QD/ BID/ TID                           ____________________Other      Visit Charges:    Recheck 2           Recheck 3               Recheck 4              Recheck 5      INJ ______________________    New PT 2            New PT 3                New PT 4               New PT 5          Pre-op / Post-op      Physical Therapy:    Lumbar  /  Cervical                     ___________________________ (Traction / Ultrasound, Dry Needling)       Referral: Delray Munch /  [facility]   /OTHER: __________________________________________      Follow-up with SEL______________________________________________________________      Work Note: _____________________________________________________________________

## 2022-06-28 NOTE — PROGRESS NOTES
Name: Lanette Gaspar  YOB: 1945  Gender: male  MRN: [id number]    DATE: 6/28/2022    CC: Lower Back Pain       HPI this is a new visit on patient Oanh Burton. He is a 78-year-old white male who is having persistent low back pain this been present for 6 to 8 weeks after falling onto his buttocks. He had acute onset of severe back pain and he denies having any leg pain this pain has not significantly improved over time. On more detailed questioning and also does with discussion with his wife he does have more longstanding claudication symptoms with prolonged standing and walking that is improved by sitting. His current pain is constant and worsened by any movement and has improved by reclining still. He is we sent MRI shows that he has acute L2 and L4 fractures and chronic L3-L5 spinal stenosis. He has treated the pain with activity restriction, tramadol and Tylenol. His medical history is significant for having had several heart attacks with the last one several years ago when he had a stent placed 2 to 3 years ago. He is not a diabetic but he does have a pacemaker/defibrillator in place and he does have COPD. In addition he takes Plavix as a blood thinner. AP lateral lumbar x-rays today 6/28/2022 show a degenerative spine with normal in AP and sagittal contour but there is wedging of the L2 and L4 vertebral bodies. Lumbar MRI scan done 6/2/2022 shows notable acute signal change on the STIR and T1 images of the L2 and L4 vertebral bodies indicating recent fractures. He also has notable spinal stenosis from L3-L5. PE: On exam he is a large gentleman who looks his stated age but he also looks notably uncomfortable. His pupils are equal round reactive light and his neck is without adenopathy or bruit. His lungs are clear to auscultation bilaterally and heart is regular rate and rhythm without murmur. His abdomen is soft and nontender.     CURRENT MEDS:     Current Outpatient Medications:     calcitonin (MIACALCIN) 200 UNIT/ACT nasal spray, 1 spray by Nasal route daily, Disp: 1 each, Rfl: 1    albuterol sulfate  (90 Base) MCG/ACT inhaler, USE 2 PUFFS BY INHALATION 4 TIMES DAILY AS NEEDED FOR WHEEZING OR SHORTNESS OF BREATH., Disp: , Rfl:     amiodarone (CORDARONE) 200 MG tablet, Take 200 mg by mouth 2 times daily, Disp: , Rfl:     apixaban (ELIQUIS) 5 MG TABS tablet, Take 5 mg by mouth every 12 hours, Disp: , Rfl:     ascorbic acid (VITAMIN C) 500 MG tablet, Take 500 mg by mouth, Disp: , Rfl:     carvedilol (COREG) 25 MG tablet, Take 25 mg by mouth 2 times daily (with meals), Disp: , Rfl:     Cholecalciferol 50 MCG (2000 UT) TABS, Take 2,000 Units by mouth, Disp: , Rfl:     clopidogrel (PLAVIX) 75 MG tablet, Take 75 mg by mouth daily, Disp: , Rfl:     fluticasone (FLONASE) 50 MCG/ACT nasal spray, USE 1 OR 2 SPRAYS IN EACH NOSTRIL EVERY DAY., Disp: , Rfl:     furosemide (LASIX) 40 MG tablet, Take 20 mg by mouth daily TAKE 1 TABLET BY MOUTH EVERY DAY, Disp: , Rfl:     glipiZIDE (GLUCOTROL XL) 10 MG extended release tablet, TAKE 1 TABLET BY MOUTH TWICE DAILY, Disp: , Rfl:     glucosamine-chondroitin 750-600 MG TABS tablet, Take by mouth 2 times daily, Disp: , Rfl:     guaiFENesin 1200 MG TB12, Take 1,200 mg by mouth 2 times daily as needed, Disp: , Rfl:     latanoprost (XALATAN) 0.005 % ophthalmic solution, Apply 1 drop to eye, Disp: , Rfl:     magnesium oxide (MAG-OX) 400 (240 Mg) MG tablet, TAKE 1 TABLET BY MOUTH EVERY DAY, Disp: , Rfl:     metFORMIN (GLUCOPHAGE) 500 MG tablet, TAKE TWO TABLETS BY MOUTH 2 TIMES A DAY, Disp: , Rfl:     montelukast (SINGULAIR) 10 MG tablet, TAKE 1 TABLET BY MOUTH EVERY DAY AT BEDTIME, Disp: , Rfl:     nitroGLYCERIN (NITROSTAT) 0.4 MG SL tablet, Place 0.4 mg under the tongue, Disp: , Rfl:     polyethylene glycol (GLYCOLAX) 17 GM/SCOOP powder, Take 17 g by mouth daily, Disp: , Rfl:     rosuvastatin (CRESTOR) 10 MG tablet, TAKE ONE TABLET BY MOUTH ONCE A DAY, Disp: , Rfl:     SITagliptin (JANUVIA) 100 MG tablet, TAKE 1 TABLET BY MOUTH EVERY DAY, Disp: , Rfl:     valsartan (DIOVAN) 320 MG tablet, Take 320 mg by mouth daily, Disp: , Rfl:        ASSESSMENT/PLAN: I talked with the patient and his wife in detail and I discussed that he has 2 problems. A more longstanding problem that is pain in the back and legs that exacerbated by prolonged standing and walking is improved by sitting however, his new pain that is just back pain that sharper in nature and is brought on by any type of movement is secondary to spinal fractures at L2 and L4. I discussed the natural history that the usually heal on their own but sometimes can be delayed in healing and sometimes can develop a nonunion but I would have expected that by now he would have already had significant improvement in his pain which she has not. Options for the fracture pain would be to give it more time and see if it will improve or to do an outpatient surgery Cotta kyphoplasty where we artificially heal the fractures by injecting bone cement. I discussed the risks which include death, nerve injury, bleeding, infection, leakage of cement onto vital structures, heart attack or stroke, clot leg or lung, failure to get adequate pain relief as well as development of new fractures. He understands. He and his wife had a long discussion in the room and they have decided to proceed on with the L2 and L4 kyphoplasty. He will need to be off his Plavix for at least 3 days before the procedure and we may need to talk to his cardiologist Dr. Bonifacio Carter to make sure it is okay for him to come off although he has had some recent eye surgery and was able to come off the Plavix without problem. I did remind him that he is more chronic claudication symptoms would not be relieved by this procedure. Pepper Boo was seen today for lower back pain.     Diagnoses and all orders for this visit:    Age-related osteoporosis with current pathological fracture of vertebra, initial encounter ([facility]  -     XR LUMBAR SPINE (2-3 VIEWS); Future         [unfilled]     No follow-up provider specified.      Electronically signed by Felicita Rodríguez MD

## 2022-06-29 PROCEDURE — 93283 PRGRMG EVAL IMPLANTABLE DFB: CPT | Performed by: INTERNAL MEDICINE

## 2022-06-29 NOTE — PROGRESS NOTES
This note will not be viewable in [address] IN OFFICE CHECK    Preliminary Impression: Ruby device check by company representative. Normal ICD function. See report for details. Following MD: Dr. Rivas Manteo  Implanting MD: Dr. Chandana Lowe presented to the [address] [place] [address] Drive today for a device check. The device was interrogated, and the results were forwarded to the ordering provider for review.      Chantel Armstrong  6/29/2022  11:50 AM

## 2022-07-06 ENCOUNTER — OFFICE VISIT (OUTPATIENT)
Dept: VASCULAR SURGERY | Age: 77
End: 2022-07-06
Payer: MEDICARE

## 2022-07-06 VITALS
HEART RATE: 94 BPM | TEMPERATURE: 97.2 F | OXYGEN SATURATION: 94 % | HEIGHT: 73 IN | BODY MASS INDEX: 35.52 KG/M2 | SYSTOLIC BLOOD PRESSURE: 165 MMHG | DIASTOLIC BLOOD PRESSURE: 73 MMHG | WEIGHT: 268 LBS

## 2022-07-06 DIAGNOSIS — I73.9 PAD (PERIPHERAL ARTERY DISEASE) (HCC): Primary | ICD-10-CM

## 2022-07-06 PROCEDURE — G8427 DOCREV CUR MEDS BY ELIG CLIN: HCPCS | Performed by: NURSE PRACTITIONER

## 2022-07-06 PROCEDURE — 99213 OFFICE O/P EST LOW 20 MIN: CPT | Performed by: NURSE PRACTITIONER

## 2022-07-06 PROCEDURE — 4004F PT TOBACCO SCREEN RCVD TLK: CPT | Performed by: NURSE PRACTITIONER

## 2022-07-06 PROCEDURE — G8417 CALC BMI ABV UP PARAM F/U: HCPCS | Performed by: NURSE PRACTITIONER

## 2022-07-06 PROCEDURE — 1123F ACP DISCUSS/DSCN MKR DOCD: CPT | Performed by: NURSE PRACTITIONER

## 2022-07-06 ASSESSMENT — PATIENT HEALTH QUESTIONNAIRE - PHQ9
SUM OF ALL RESPONSES TO PHQ QUESTIONS 1-9: 0
2. FEELING DOWN, DEPRESSED OR HOPELESS: 0
SUM OF ALL RESPONSES TO PHQ QUESTIONS 1-9: 0
1. LITTLE INTEREST OR PLEASURE IN DOING THINGS: 0
SUM OF ALL RESPONSES TO PHQ QUESTIONS 1-9: 0
SUM OF ALL RESPONSES TO PHQ9 QUESTIONS 1 & 2: 0
SUM OF ALL RESPONSES TO PHQ QUESTIONS 1-9: 0

## 2022-07-07 ENCOUNTER — PREP FOR PROCEDURE (OUTPATIENT)
Dept: ORTHOPEDIC SURGERY | Age: 77
End: 2022-07-07

## 2022-07-07 ENCOUNTER — TELEPHONE (OUTPATIENT)
Dept: CARDIOLOGY CLINIC | Age: 77
End: 2022-07-07

## 2022-07-07 ENCOUNTER — TELEPHONE (OUTPATIENT)
Dept: ORTHOPEDIC SURGERY | Age: 77
End: 2022-07-07

## 2022-07-07 DIAGNOSIS — M80.08XA AGE-RELATED OSTEOPOROSIS WITH CURRENT PATHOLOGICAL FRACTURE OF VERTEBRA, INITIAL ENCOUNTER (HCC): Primary | ICD-10-CM

## 2022-07-07 NOTE — TELEPHONE ENCOUNTER
Nila Jo with Dr Stella Mireles at [facility] card.  [phone](AD need) faxed the card clearance letter and the ok to hold plavix for 5 days for upcoming surg

## 2022-07-07 NOTE — TELEPHONE ENCOUNTER
Per Dr. Ramone Lind [facility] to hold Plavix x 5 days. Resume ASAP following surgery. Low risk for cardiac events. Faxed to Dr. Chelo Feng' office @ [phone].

## 2022-07-13 ENCOUNTER — TELEPHONE (OUTPATIENT)
Dept: ORTHOPEDIC SURGERY | Age: 77
End: 2022-07-13

## 2022-07-13 RX ORDER — FLUTICASONE PROPIONATE AND SALMETEROL 250; 50 UG/1; UG/1
1 POWDER RESPIRATORY (INHALATION) 2 TIMES DAILY
COMMUNITY
Start: 2022-06-30

## 2022-07-13 NOTE — TELEPHONE ENCOUNTER
[facility] with sf pre ass [phone] or tomorrow Lulu Membreno [phone] can't pre ass patient til he get eliquis hold instructions, call patient will instructions

## 2022-07-14 ENCOUNTER — TELEPHONE (OUTPATIENT)
Dept: CARDIOLOGY CLINIC | Age: 77
End: 2022-07-14

## 2022-07-14 ENCOUNTER — TELEPHONE (OUTPATIENT)
Dept: ORTHOPEDIC SURGERY | Age: 77
End: 2022-07-14

## 2022-07-14 NOTE — PERIOP NOTE
Attempted to call patient to complete phone assessment; no answer. LVM asking patient to call us back asap in regards to his plavix hold and surgery on 7/15/2022. Message erroneously sent to GINA Qureshi RN from [address] at Dr. Fransisco Sidhu' office. Message was to go to GINA Rodriguez RN who had called POA about patient not having held plavix and is for surgery 7/15/22 with Dr. Fransisco Sidhu. Message sent to Nereida Vasquez via [employer] and also via [employer] addressing issue of surgery tomorrow and patient still not having held plavix per Shanderson Linn report. Notified Charge Nurse GINA Morrison RN of issues with surgery 7/15/2022.

## 2022-07-14 NOTE — PERIOP NOTE
Directly informed patient and or family member of pre op arrival time (10:15am) on 7/15/22. All questions answered. Pre op instructions reviewed. Left contact information for any additional questions or needs.

## 2022-07-14 NOTE — TELEPHONE ENCOUNTER
His surgery had to be rescheduled to 07/19 They did not realize he is on Eliquis and he needs to hold this 3 days. We sent in telling them he could hold the Plavix 5 days. Dr Vinnie Opitz is the surgeon.  ASAP please

## 2022-07-14 NOTE — TELEPHONE ENCOUNTER
Call Dorina Wiggins w/ sf preass [phone]  Patient has a clearance to hold plavix but not eliquis and continued to take yest.also call patient at [phone] and give instructions. surg is sched for tomorrow and patient can't be pre assessed til gets instruction on holding meds.

## 2022-07-14 NOTE — PERIOP NOTE
Received callback from patient's spouse to complete PAT phone assessment. Verbal permission obtained from patient for this RN to speak with Spouse. Surgery is scheduled for 7/15/22. Patient and spouse states that they have not received instruction on when to stop Plavix and Eliquis and has continued to take. Last   taken 7/13/22. PAT phone assessment not completed at this time as surgery may be postponed due to Plavix and Eliquis not held. Patient instructed to call POA for instructions on plavix and eliquis hold    Clearance from Dr. Rani Shelley  to hold Plavix for 5 days prior found in EHR. No clearance found to hold Eliquis. Placed call to Dr. Maral Duval' office. Directed to . Message left notifying  POA that patient has not been instructed when to stop Plavix and Eliquis and last took on 7/13/22. POA asked to follow up with patient with instructions and notify PAT if surgery will proceed as scheduled 7/15/22 so that pre-assessment can be completed.

## 2022-07-15 ENCOUNTER — TELEPHONE (OUTPATIENT)
Dept: INTERNAL MEDICINE CLINIC | Facility: CLINIC | Age: 77
End: 2022-07-15

## 2022-07-15 NOTE — TELEPHONE ENCOUNTER
Per Dr. Sneha Ambrocio, [facility] to hold Plavix x 5 days and Eliquis x 3 days. Resume ASAP following surgery. Low risk for cardiac events. Faxed to Dr. Pearl Rankin' office @ [phone].

## 2022-07-15 NOTE — TELEPHONE ENCOUNTER
Pt wife, glendaedgard vivas called and stated that the patient is having severe stomach issues and nothing she has tried is helping it. Pt wife would like to know what else she can do.

## 2022-07-15 NOTE — TELEPHONE ENCOUNTER
Pt has been informed to be evaluated at [facility] or the ER due to feeling worse the past week. Pt state EMS was called out 24-48 hours ago but told him he was ok. Pt state he doesn't have covid because he got he shot and won't go to [facility] the ER or get tested.

## 2022-07-15 NOTE — TELEPHONE ENCOUNTER
Called and spoke with patient and he states that he is just not feeling well. Patient states that he has been running a fever but it has broken. Advised patient to go to the ER or urgent care. Patient is complaining with cough and states that he feels like he may have covid again. EMS checked him and it was all good    COVID-19 Phone Call    Are you experiencing any of the below symptoms? Fever or Chills  yes   If yes, what is your temperature? 102. something       Cough? yes  Is it dry or productive? productive  If productive, what color is the mucus? clear     Shortness of breath or difficulty Breathing? None more than normal  Have you checked your O2 Sats? 96  If so what are the readings? Fatigue? yes     Muscle or Body Aches? yes     Headaches? no     New loss of taste or smell? yes     Sore throat? no     Congestion or runny nose? yes      Nausea or Vomiting? no     Diarrhea? yes    When did you start experiencing the above symptoms? Started about a week ago      Have you had a Covid Test Done? no  If Yes, what where the results   When did you take the test?   Was it a home test or did you go to an urgent care/Facility to have the test performed? Are you vaccinated? yes  Did you receive the Booster Vaccines?  Yes      [facility] in [place]

## 2022-07-18 ENCOUNTER — TELEMEDICINE (OUTPATIENT)
Dept: INTERNAL MEDICINE CLINIC | Facility: CLINIC | Age: 77
End: 2022-07-18
Payer: MEDICARE

## 2022-07-18 ENCOUNTER — TELEPHONE (OUTPATIENT)
Dept: INTERNAL MEDICINE CLINIC | Facility: CLINIC | Age: 77
End: 2022-07-18

## 2022-07-18 DIAGNOSIS — J06.9 VIRAL UPPER RESPIRATORY TRACT INFECTION: Primary | ICD-10-CM

## 2022-07-18 DIAGNOSIS — M51.36 DDD (DEGENERATIVE DISC DISEASE), LUMBAR: ICD-10-CM

## 2022-07-18 PROCEDURE — G8428 CUR MEDS NOT DOCUMENT: HCPCS | Performed by: INTERNAL MEDICINE

## 2022-07-18 PROCEDURE — 4004F PT TOBACCO SCREEN RCVD TLK: CPT | Performed by: INTERNAL MEDICINE

## 2022-07-18 PROCEDURE — G8417 CALC BMI ABV UP PARAM F/U: HCPCS | Performed by: INTERNAL MEDICINE

## 2022-07-18 PROCEDURE — 1123F ACP DISCUSS/DSCN MKR DOCD: CPT | Performed by: INTERNAL MEDICINE

## 2022-07-18 PROCEDURE — 99213 OFFICE O/P EST LOW 20 MIN: CPT | Performed by: INTERNAL MEDICINE

## 2022-07-18 ASSESSMENT — ENCOUNTER SYMPTOMS: BACK PAIN: 1

## 2022-07-18 NOTE — TELEPHONE ENCOUNTER
Patient wife called to let you know that he took a covid test and it was neg. Surgery is being set up.

## 2022-07-18 NOTE — PROGRESS NOTES
7/18/2022 4:50 PM  Location:[facility] [address] [facility]  [place]  Patient #:  [id number]  YOB: 1945            History of Present Illness     No chief complaint on file. Mr. Ernesto Cadena is a 65 y.o. male  who presents for follow up on chronic medical problems. Courtney Kennedy is a 65 y.o. male was evaluated through a synchronous (real-time) audio-video encounter. The patient (or guardian if applicable) is aware that this is a billable service, which includes applicable co-pays. This Virtual Visit was conducted with patient's (and/or legal guardian's) consent. The visit was conducted pursuant to the emergency declaration under the [address] [place] [address] authority and the [employer] Act. Patient identification was verified, and a caregiver was present when appropriate. The patient was located at Home: [address]. Provider was located at [facility] (Appt Dept): Raghu Drummond [address] [place] [address]. Back Pain  Associated symptoms include a fever (five days ago).         Allergies   Allergen Reactions    Azithromycin Hives    Oxaprozin Other (See Comments)     ELEVATED BLOOD PRESSURE     Past Medical History:   Diagnosis Date    Acute respiratory failure with hypoxia ([address] 10/23/2014    MINI (acute kidney injury) ([address] 09/15/2014    MINI (acute kidney injury) ([address]     MINI (acute kidney injury) ([address]     Allergic rhinitis 11/10/2015    Asthma 11/10/2015    Benign essential hypertension 11/10/2015    Benign neoplasm of colon 11/10/2015    CAD (coronary artery disease) 11/10/2015    CAD (coronary artery disease) 1998, 1999    mix2, 3 stents [id number]    CAP (community acquired pneumonia) 12/31/2020    Cardiomyopathy ([address] 75/37/5226    Complicated wound infection 11/10/2015    COPD (chronic obstructive pulmonary disease) with emphysema ([address] 11/10/2015    COPD exacerbation ([address] 10/12/2011    COVID-19 12/31/2020    Diabetes mellitus type 2, controlled ([address] 11/10/2015    Diabetic neuropathy ([address] 11/10/2015    Disruption of wound, unspecified 10/09/2014    Encounter for long-term (current) use of other high-risk medications 11/10/2015    Extremity atherosclerosis with intermittent claudication ([address] 11/10/2015    H/O endarterectomy 11/10/2015    Hiatal hernia 11/10/2015    History of 2019 novel coronavirus disease (COVID-19)     12/31/2020-    Hyperglycemia due to type 1 diabetes mellitus ([address] 11/10/2015    Hyperlipidemia 11/10/2015    ICD (implantable cardioverter-defibrillator) in place 06/16/2022    Monomorphic ventricular tachycardia ([address] 12/31/2020    Myocardial infarction ([address] 1999    Myocardial infarction ([address] 11/10/2015    Obesity 11/10/2015    Orthostatic hypotension 11/10/2015    Osteoarthritis 11/10/2015    Peripheral vascular disease ([address] 11/10/2015    PNA (pneumonia) 02/08/2019    PVC (premature ventricular contraction)     Rash 98/13/5984    Seroma complicating a procedure 10/02/2014    Sleep apnea     cpap    Toe laceration     Type 2 diabetes with nephropathy ([address]     Uncontrolled type 2 diabetes mellitus ([address] 11/10/2015    Vertiginous syndromes and other disorders of vestibular system 11/10/2015     Social History     Socioeconomic History    Marital status:    Tobacco Use    Smoking status: Former     Packs/day: 1.00     Types: Cigarettes     Quit date: 10/2/2011     Years since quitting: 10.8    Smokeless tobacco: Current   Substance and Sexual Activity    Alcohol use: Yes     Alcohol/week: 0.0 standard drinks    Drug use: No   Social History Narrative    Lives with wife     Past Surgical History:   Procedure Laterality Date    CARDIAC CATHETERIZATION  12/05/2018    LV EF=40%. LM:nl. LAD:30-40% mid stenosis. LCX OM1:95% stenosis. RCA:100% occlusion at RV branch.     CORONARY ANGIOPLASTY WITH STENT PLACEMENT  12/05/2018 LCX OM1:2.5 x 12 [place] RAKESH    CORONARY ANGIOPLASTY WITH STENT PLACEMENT  1999    SD ABDOMEN SURGERY PROC UNLISTED  1960    abdominal cyst removed    SD CARDIAC SURG PROCEDURE UNLIST  1999    stents x3    SPINE SURGERY N/A 7/19/2022    LUMBAR 2, LUMBAR 4 KYPHOPLASTY AND ANY OTHER INDICATED LEVELS performed by Casey Ibrahim III, MD at [facility]  11/5/14    Repair of right common femoral artery     VASCULAR SURGERY  9/11/14    tiffanie femoral endarterectomy     Current Outpatient Medications   Medication Sig Dispense Refill    traMADol (ULTRAM) 50 MG tablet Take 1 tablet by mouth every 6 hours as needed for Pain for up to 7 days. Intended supply: 7 days. Take lowest dose possible to manage pain 28 tablet 0    fluticasone-salmeterol (ADVAIR) 250-50 MCG/ACT AEPB diskus inhaler Inhale 1 puff into the lungs in the morning and at bedtime      calcitonin (MIACALCIN) 200 UNIT/ACT nasal spray 1 spray by Nasal route daily 1 each 1    albuterol sulfate  (90 Base) MCG/ACT inhaler USE 2 PUFFS BY INHALATION 4 TIMES DAILY AS NEEDED FOR WHEEZING OR SHORTNESS OF BREATH.      amiodarone (CORDARONE) 200 MG tablet Take 200 mg by mouth 2 times daily      apixaban (ELIQUIS) 5 MG TABS tablet Take 5 mg by mouth every 12 hours      ascorbic acid (VITAMIN C) 500 MG tablet Take 500 mg by mouth      carvedilol (COREG) 25 MG tablet Take 25 mg by mouth 2 times daily (with meals)      Cholecalciferol 50 MCG (2000 UT) TABS Take 2,000 Units by mouth      clopidogrel (PLAVIX) 75 MG tablet Take 75 mg by mouth daily      fluticasone (FLONASE) 50 MCG/ACT nasal spray USE 1 OR 2 SPRAYS IN EACH NOSTRIL EVERY DAY.       furosemide (LASIX) 40 MG tablet Take 20 mg by mouth daily TAKE 1 TABLET BY MOUTH EVERY DAY      glipiZIDE (GLUCOTROL XL) 10 MG extended release tablet TAKE 1 TABLET BY MOUTH TWICE DAILY      glucosamine-chondroitin 750-600 MG TABS tablet Take by mouth 2 times daily      guaiFENesin 1200 MG TB12 Take 1,200 mg by mouth 2 times daily as needed      latanoprost (XALATAN) 0.005 % ophthalmic solution Apply 1 drop to eye      magnesium oxide (MAG-OX) 400 (240 Mg) MG tablet TAKE 1 TABLET BY MOUTH EVERY DAY      metFORMIN (GLUCOPHAGE) 500 MG tablet TAKE TWO TABLETS BY MOUTH 2 TIMES A DAY      montelukast (SINGULAIR) 10 MG tablet TAKE 1 TABLET BY MOUTH EVERY DAY AT BEDTIME      nitroGLYCERIN (NITROSTAT) 0.4 MG SL tablet Place 0.4 mg under the tongue      polyethylene glycol (GLYCOLAX) 17 GM/SCOOP powder Take 17 g by mouth daily      rosuvastatin (CRESTOR) 10 MG tablet TAKE ONE TABLET BY MOUTH ONCE A DAY      SITagliptin (JANUVIA) 100 MG tablet TAKE 1 TABLET BY MOUTH EVERY DAY      valsartan (DIOVAN) 320 MG tablet Take 320 mg by mouth daily       No current facility-administered medications for this visit. Health Maintenance   Topic Date Due    DTaP/Tdap/Td vaccine (1 - Tdap) Never done    Pneumococcal 65+ years Vaccine (3 - PPSV23 or PCV20) 10/17/2016    COVID-19 Vaccine (3 - Booster for Moderna series) 09/15/2021    Flu vaccine (1) 09/01/2022    Lipids  03/05/2023    Annual Wellness Visit (AWV)  03/24/2023    Prostate Specific Antigen (PSA) Screening or Monitoring  05/18/2023    Depression Screen  07/06/2023    Shingles vaccine  Completed    Hepatitis C screen  Completed    Hepatitis A vaccine  Aged Out    Hib vaccine  Aged Out    Meningococcal (ACWY) vaccine  Aged Out     Family History   Problem Relation Age of Onset    Breast Cancer Mother     Hypertension Mother     Other Father         DIVERTICULITIS    Crohn's Disease Sister     Lung Disease Brother         mesothioloma    Diabetes Sister     Heart Attack Father     Heart Disease Father     Stroke Mother              Review of Systems  Review of Systems   Constitutional:  Positive for fever (five days ago). Respiratory:  Positive for shortness of breath (at baseline). Gastrointestinal:         Episode of fecal incontinence   Musculoskeletal:  Positive for back pain. There were no vitals taken for this visit. Physical Exam    Physical Exam  Constitutional:       General: He is not in acute distress. Appearance: Normal appearance. He is obese. He is not toxic-appearing. HENT:      Head: Normocephalic and atraumatic. Pulmonary:      Breath sounds: Wheezing (with coughing) present. Neurological:      Mental Status: He is alert and oriented to person, place, and time. Psychiatric:         Mood and Affect: Mood normal.         Behavior: Behavior normal.         Assessment & Plan    Current Outpatient Medications   Medication Sig Dispense Refill    traMADol (ULTRAM) 50 MG tablet Take 1 tablet by mouth every 6 hours as needed for Pain for up to 7 days. Intended supply: 7 days. Take lowest dose possible to manage pain 28 tablet 0    fluticasone-salmeterol (ADVAIR) 250-50 MCG/ACT AEPB diskus inhaler Inhale 1 puff into the lungs in the morning and at bedtime      calcitonin (MIACALCIN) 200 UNIT/ACT nasal spray 1 spray by Nasal route daily 1 each 1    albuterol sulfate  (90 Base) MCG/ACT inhaler USE 2 PUFFS BY INHALATION 4 TIMES DAILY AS NEEDED FOR WHEEZING OR SHORTNESS OF BREATH.      amiodarone (CORDARONE) 200 MG tablet Take 200 mg by mouth 2 times daily      apixaban (ELIQUIS) 5 MG TABS tablet Take 5 mg by mouth every 12 hours      ascorbic acid (VITAMIN C) 500 MG tablet Take 500 mg by mouth      carvedilol (COREG) 25 MG tablet Take 25 mg by mouth 2 times daily (with meals)      Cholecalciferol 50 MCG (2000 UT) TABS Take 2,000 Units by mouth      clopidogrel (PLAVIX) 75 MG tablet Take 75 mg by mouth daily      fluticasone (FLONASE) 50 MCG/ACT nasal spray USE 1 OR 2 SPRAYS IN EACH NOSTRIL EVERY DAY.       furosemide (LASIX) 40 MG tablet Take 20 mg by mouth daily TAKE 1 TABLET BY MOUTH EVERY DAY      glipiZIDE (GLUCOTROL XL) 10 MG extended release tablet TAKE 1 TABLET BY MOUTH TWICE DAILY      glucosamine-chondroitin 750-600 MG TABS tablet Take by mouth 2 times daily      guaiFENesin 1200 MG TB12 Take 1,200 mg by mouth 2 times daily as needed      latanoprost (XALATAN) 0.005 % ophthalmic solution Apply 1 drop to eye      magnesium oxide (MAG-OX) 400 (240 Mg) MG tablet TAKE 1 TABLET BY MOUTH EVERY DAY      metFORMIN (GLUCOPHAGE) 500 MG tablet TAKE TWO TABLETS BY MOUTH 2 TIMES A DAY      montelukast (SINGULAIR) 10 MG tablet TAKE 1 TABLET BY MOUTH EVERY DAY AT BEDTIME      nitroGLYCERIN (NITROSTAT) 0.4 MG SL tablet Place 0.4 mg under the tongue      polyethylene glycol (GLYCOLAX) 17 GM/SCOOP powder Take 17 g by mouth daily      rosuvastatin (CRESTOR) 10 MG tablet TAKE ONE TABLET BY MOUTH ONCE A DAY      SITagliptin (JANUVIA) 100 MG tablet TAKE 1 TABLET BY MOUTH EVERY DAY      valsartan (DIOVAN) 320 MG tablet Take 320 mg by mouth daily       No current facility-administered medications for this visit. No orders of the defined types were placed in this encounter. No orders of the defined types were placed in this encounter. There are no discontinued medications. Diagnosis Orders   1. Viral upper respiratory tract infection        2. DDD (degenerative disc disease), lumbar           Is doing fairly well physically and emotionally. His back is his biggest issue. Urged him to go forward with testing for COVID. Call with result. Will have to postpone back surgery if he is positive for COVID. Otherwise, his viral illness is much improved. Has an appointment in the future. Follow up as previously scheduled or earlier as needed. Knows to keep a low threshold for contacting the office with worsening symptoms. No follow-ups on file.           Donavon Blackwell MD

## 2022-07-19 ENCOUNTER — ANESTHESIA EVENT (OUTPATIENT)
Dept: SURGERY | Age: 77
End: 2022-07-19
Payer: MEDICARE

## 2022-07-19 ENCOUNTER — HOSPITAL ENCOUNTER (OUTPATIENT)
Age: 77
Setting detail: OUTPATIENT SURGERY
Discharge: HOME | End: 2022-07-19
Attending: ORTHOPAEDIC SURGERY | Admitting: ORTHOPAEDIC SURGERY
Payer: MEDICARE

## 2022-07-19 ENCOUNTER — ANESTHESIA (OUTPATIENT)
Dept: SURGERY | Age: 77
End: 2022-07-19
Payer: MEDICARE

## 2022-07-19 ENCOUNTER — TELEPHONE (OUTPATIENT)
Dept: ORTHOPEDIC SURGERY | Age: 77
End: 2022-07-19

## 2022-07-19 ENCOUNTER — APPOINTMENT (OUTPATIENT)
Dept: GENERAL RADIOLOGY | Age: 77
End: 2022-07-19
Attending: ORTHOPAEDIC SURGERY
Payer: MEDICARE

## 2022-07-19 VITALS
RESPIRATION RATE: 20 BRPM | DIASTOLIC BLOOD PRESSURE: 66 MMHG | TEMPERATURE: 98.8 F | HEIGHT: 73 IN | BODY MASS INDEX: 34.99 KG/M2 | WEIGHT: 264 LBS | SYSTOLIC BLOOD PRESSURE: 138 MMHG | OXYGEN SATURATION: 94 % | HEART RATE: 64 BPM

## 2022-07-19 DIAGNOSIS — M80.08XA AGE-REL OSTEOPOR W CURRENT PATH FRACTURE, VERTEBRA(E), INIT (HCC): ICD-10-CM

## 2022-07-19 DIAGNOSIS — M80.08XA AGE-REL OSTEOPOR W CURRENT PATH FRACTURE, VERTEBRA(E), INIT (HCC): Primary | ICD-10-CM

## 2022-07-19 LAB
GLUCOSE BLD STRIP.AUTO-MCNC: 167 MG/DL (ref 65–100)
POTASSIUM BLD-SCNC: 3.1 MMOL/L (ref 3.5–5.1)
SERVICE CMNT-IMP: ABNORMAL

## 2022-07-19 PROCEDURE — 6360000002 HC RX W HCPCS: Performed by: ORTHOPAEDIC SURGERY

## 2022-07-19 PROCEDURE — 7100000001 HC PACU RECOVERY - ADDTL 15 MIN: Performed by: ORTHOPAEDIC SURGERY

## 2022-07-19 PROCEDURE — 6370000000 HC RX 637 (ALT 250 FOR IP): Performed by: ORTHOPAEDIC SURGERY

## 2022-07-19 PROCEDURE — C1894 INTRO/SHEATH, NON-LASER: HCPCS | Performed by: ORTHOPAEDIC SURGERY

## 2022-07-19 PROCEDURE — 6360000002 HC RX W HCPCS: Performed by: NURSE ANESTHETIST, CERTIFIED REGISTERED

## 2022-07-19 PROCEDURE — 82962 GLUCOSE BLOOD TEST: CPT

## 2022-07-19 PROCEDURE — 7100000000 HC PACU RECOVERY - FIRST 15 MIN: Performed by: ORTHOPAEDIC SURGERY

## 2022-07-19 PROCEDURE — 88341 IMHCHEM/IMCYTCHM EA ADD ANTB: CPT

## 2022-07-19 PROCEDURE — 2720000010 HC SURG SUPPLY STERILE: Performed by: ORTHOPAEDIC SURGERY

## 2022-07-19 PROCEDURE — 7100000010 HC PHASE II RECOVERY - FIRST 15 MIN: Performed by: ORTHOPAEDIC SURGERY

## 2022-07-19 PROCEDURE — 3600000004 HC SURGERY LEVEL 4 BASE: Performed by: ORTHOPAEDIC SURGERY

## 2022-07-19 PROCEDURE — 3700000000 HC ANESTHESIA ATTENDED CARE: Performed by: ORTHOPAEDIC SURGERY

## 2022-07-19 PROCEDURE — 3600000014 HC SURGERY LEVEL 4 ADDTL 15MIN: Performed by: ORTHOPAEDIC SURGERY

## 2022-07-19 PROCEDURE — 88311 DECALCIFY TISSUE: CPT

## 2022-07-19 PROCEDURE — C1713 ANCHOR/SCREW BN/BN,TIS/BN: HCPCS | Performed by: ORTHOPAEDIC SURGERY

## 2022-07-19 PROCEDURE — 88307 TISSUE EXAM BY PATHOLOGIST: CPT

## 2022-07-19 PROCEDURE — 22515 PERQ VERTEBRAL AUGMENTATION: CPT | Performed by: ORTHOPAEDIC SURGERY

## 2022-07-19 PROCEDURE — 88342 IMHCHEM/IMCYTCHM 1ST ANTB: CPT

## 2022-07-19 PROCEDURE — 7100000011 HC PHASE II RECOVERY - ADDTL 15 MIN: Performed by: ORTHOPAEDIC SURGERY

## 2022-07-19 PROCEDURE — 22514 PERQ VERTEBRAL AUGMENTATION: CPT | Performed by: ORTHOPAEDIC SURGERY

## 2022-07-19 PROCEDURE — 84132 ASSAY OF SERUM POTASSIUM: CPT

## 2022-07-19 PROCEDURE — 3700000001 HC ADD 15 MINUTES (ANESTHESIA): Performed by: ORTHOPAEDIC SURGERY

## 2022-07-19 PROCEDURE — 2500000003 HC RX 250 WO HCPCS: Performed by: NURSE ANESTHETIST, CERTIFIED REGISTERED

## 2022-07-19 PROCEDURE — 2580000003 HC RX 258: Performed by: ANESTHESIOLOGY

## 2022-07-19 PROCEDURE — 6360000002 HC RX W HCPCS: Performed by: ANESTHESIOLOGY

## 2022-07-19 PROCEDURE — 2709999900 HC NON-CHARGEABLE SUPPLY: Performed by: ORTHOPAEDIC SURGERY

## 2022-07-19 PROCEDURE — 72100 X-RAY EXAM L-S SPINE 2/3 VWS: CPT

## 2022-07-19 PROCEDURE — 6370000000 HC RX 637 (ALT 250 FOR IP): Performed by: ANESTHESIOLOGY

## 2022-07-19 DEVICE — BONE CEMENT CX01A XPEDE US
Type: IMPLANTABLE DEVICE | Site: BACK | Status: FUNCTIONAL
Brand: KYPHON® XPEDE™ BONE CEMENT

## 2022-07-19 RX ORDER — ONDANSETRON 2 MG/ML
4 INJECTION INTRAMUSCULAR; INTRAVENOUS
Status: DISCONTINUED | OUTPATIENT
Start: 2022-07-19 | End: 2022-07-19 | Stop reason: HOSPADM

## 2022-07-19 RX ORDER — LIDOCAINE HYDROCHLORIDE 20 MG/ML
INJECTION, SOLUTION EPIDURAL; INFILTRATION; INTRACAUDAL; PERINEURAL PRN
Status: DISCONTINUED | OUTPATIENT
Start: 2022-07-19 | End: 2022-07-19 | Stop reason: SDUPTHER

## 2022-07-19 RX ORDER — OXYCODONE HYDROCHLORIDE 5 MG/1
10 TABLET ORAL PRN
Status: COMPLETED | OUTPATIENT
Start: 2022-07-19 | End: 2022-07-19

## 2022-07-19 RX ORDER — FENTANYL CITRATE 50 UG/ML
INJECTION, SOLUTION INTRAMUSCULAR; INTRAVENOUS PRN
Status: DISCONTINUED | OUTPATIENT
Start: 2022-07-19 | End: 2022-07-19 | Stop reason: SDUPTHER

## 2022-07-19 RX ORDER — PROPOFOL 10 MG/ML
INJECTION, EMULSION INTRAVENOUS PRN
Status: DISCONTINUED | OUTPATIENT
Start: 2022-07-19 | End: 2022-07-19 | Stop reason: SDUPTHER

## 2022-07-19 RX ORDER — HYDROMORPHONE HYDROCHLORIDE 2 MG/ML
0.5 INJECTION, SOLUTION INTRAMUSCULAR; INTRAVENOUS; SUBCUTANEOUS EVERY 5 MIN PRN
Status: DISCONTINUED | OUTPATIENT
Start: 2022-07-19 | End: 2022-07-19 | Stop reason: HOSPADM

## 2022-07-19 RX ORDER — SODIUM CHLORIDE 9 MG/ML
INJECTION, SOLUTION INTRAVENOUS PRN
Status: DISCONTINUED | OUTPATIENT
Start: 2022-07-19 | End: 2022-07-19 | Stop reason: HOSPADM

## 2022-07-19 RX ORDER — SODIUM CHLORIDE, SODIUM LACTATE, POTASSIUM CHLORIDE, CALCIUM CHLORIDE 600; 310; 30; 20 MG/100ML; MG/100ML; MG/100ML; MG/100ML
INJECTION, SOLUTION INTRAVENOUS CONTINUOUS
Status: DISCONTINUED | OUTPATIENT
Start: 2022-07-19 | End: 2022-07-19 | Stop reason: HOSPADM

## 2022-07-19 RX ORDER — GLYCOPYRROLATE 0.2 MG/ML
INJECTION INTRAMUSCULAR; INTRAVENOUS PRN
Status: DISCONTINUED | OUTPATIENT
Start: 2022-07-19 | End: 2022-07-19 | Stop reason: SDUPTHER

## 2022-07-19 RX ORDER — NEOSTIGMINE METHYLSULFATE 1 MG/ML
INJECTION, SOLUTION INTRAVENOUS PRN
Status: DISCONTINUED | OUTPATIENT
Start: 2022-07-19 | End: 2022-07-19 | Stop reason: SDUPTHER

## 2022-07-19 RX ORDER — SODIUM CHLORIDE 0.9 % (FLUSH) 0.9 %
5-40 SYRINGE (ML) INJECTION PRN
Status: DISCONTINUED | OUTPATIENT
Start: 2022-07-19 | End: 2022-07-19 | Stop reason: HOSPADM

## 2022-07-19 RX ORDER — ETOMIDATE 2 MG/ML
INJECTION INTRAVENOUS PRN
Status: DISCONTINUED | OUTPATIENT
Start: 2022-07-19 | End: 2022-07-19

## 2022-07-19 RX ORDER — TRAMADOL HYDROCHLORIDE 50 MG/1
50 TABLET ORAL EVERY 6 HOURS PRN
Qty: 28 TABLET | Refills: 0 | Status: SHIPPED | OUTPATIENT
Start: 2022-07-19 | End: 2022-07-26

## 2022-07-19 RX ORDER — ETOMIDATE 2 MG/ML
INJECTION INTRAVENOUS PRN
Status: DISCONTINUED | OUTPATIENT
Start: 2022-07-19 | End: 2022-07-19 | Stop reason: SDUPTHER

## 2022-07-19 RX ORDER — OXYCODONE HYDROCHLORIDE 5 MG/1
5 TABLET ORAL PRN
Status: COMPLETED | OUTPATIENT
Start: 2022-07-19 | End: 2022-07-19

## 2022-07-19 RX ORDER — SUCCINYLCHOLINE CHLORIDE 20 MG/ML
INJECTION INTRAMUSCULAR; INTRAVENOUS PRN
Status: DISCONTINUED | OUTPATIENT
Start: 2022-07-19 | End: 2022-07-19 | Stop reason: SDUPTHER

## 2022-07-19 RX ORDER — DEXAMETHASONE SODIUM PHOSPHATE 4 MG/ML
INJECTION, SOLUTION INTRA-ARTICULAR; INTRALESIONAL; INTRAMUSCULAR; INTRAVENOUS; SOFT TISSUE PRN
Status: DISCONTINUED | OUTPATIENT
Start: 2022-07-19 | End: 2022-07-19 | Stop reason: SDUPTHER

## 2022-07-19 RX ORDER — SODIUM CHLORIDE 0.9 % (FLUSH) 0.9 %
5-40 SYRINGE (ML) INJECTION EVERY 12 HOURS SCHEDULED
Status: DISCONTINUED | OUTPATIENT
Start: 2022-07-19 | End: 2022-07-19 | Stop reason: HOSPADM

## 2022-07-19 RX ORDER — ROCURONIUM BROMIDE 10 MG/ML
INJECTION, SOLUTION INTRAVENOUS PRN
Status: DISCONTINUED | OUTPATIENT
Start: 2022-07-19 | End: 2022-07-19 | Stop reason: SDUPTHER

## 2022-07-19 RX ORDER — HYDROMORPHONE HYDROCHLORIDE 2 MG/ML
0.25 INJECTION, SOLUTION INTRAMUSCULAR; INTRAVENOUS; SUBCUTANEOUS EVERY 5 MIN PRN
Status: DISCONTINUED | OUTPATIENT
Start: 2022-07-19 | End: 2022-07-19 | Stop reason: HOSPADM

## 2022-07-19 RX ORDER — ONDANSETRON 2 MG/ML
INJECTION INTRAMUSCULAR; INTRAVENOUS PRN
Status: DISCONTINUED | OUTPATIENT
Start: 2022-07-19 | End: 2022-07-19 | Stop reason: SDUPTHER

## 2022-07-19 RX ADMIN — PROPOFOL 30 MG: 10 INJECTION, EMULSION INTRAVENOUS at 17:10

## 2022-07-19 RX ADMIN — HYDROMORPHONE HYDROCHLORIDE 0.5 MG: 2 INJECTION, SOLUTION INTRAMUSCULAR; INTRAVENOUS; SUBCUTANEOUS at 18:26

## 2022-07-19 RX ADMIN — Medication 2000 MG: at 17:16

## 2022-07-19 RX ADMIN — FENTANYL CITRATE 50 MCG: 50 INJECTION, SOLUTION INTRAMUSCULAR; INTRAVENOUS at 17:05

## 2022-07-19 RX ADMIN — ROCURONIUM BROMIDE 20 MG: 50 INJECTION, SOLUTION INTRAVENOUS at 17:16

## 2022-07-19 RX ADMIN — HYDROMORPHONE HYDROCHLORIDE 0.5 MG: 2 INJECTION, SOLUTION INTRAMUSCULAR; INTRAVENOUS; SUBCUTANEOUS at 18:38

## 2022-07-19 RX ADMIN — PROPOFOL 30 MG: 10 INJECTION, EMULSION INTRAVENOUS at 17:16

## 2022-07-19 RX ADMIN — PROPOFOL 30 MG: 10 INJECTION, EMULSION INTRAVENOUS at 17:34

## 2022-07-19 RX ADMIN — ETOMIDATE 24 MG: 2 INJECTION, SOLUTION INTRAVENOUS at 17:04

## 2022-07-19 RX ADMIN — LIDOCAINE HYDROCHLORIDE 100 MG: 20 INJECTION, SOLUTION EPIDURAL; INFILTRATION; INTRACAUDAL; PERINEURAL at 17:04

## 2022-07-19 RX ADMIN — ONDANSETRON 4 MG: 2 INJECTION INTRAMUSCULAR; INTRAVENOUS at 17:42

## 2022-07-19 RX ADMIN — FENTANYL CITRATE 25 MCG: 50 INJECTION, SOLUTION INTRAMUSCULAR; INTRAVENOUS at 16:54

## 2022-07-19 RX ADMIN — OXYCODONE 10 MG: 5 TABLET ORAL at 18:55

## 2022-07-19 RX ADMIN — GLYCOPYRROLATE 0.8 MG: 0.2 INJECTION, SOLUTION INTRAMUSCULAR; INTRAVENOUS at 17:55

## 2022-07-19 RX ADMIN — Medication 160 MG: at 17:04

## 2022-07-19 RX ADMIN — Medication 5 MG: at 17:55

## 2022-07-19 RX ADMIN — SODIUM CHLORIDE, POTASSIUM CHLORIDE, SODIUM LACTATE AND CALCIUM CHLORIDE: 600; 310; 30; 20 INJECTION, SOLUTION INTRAVENOUS at 13:49

## 2022-07-19 RX ADMIN — DEXAMETHASONE SODIUM PHOSPHATE 4 MG: 4 INJECTION, SOLUTION INTRAMUSCULAR; INTRAVENOUS at 17:42

## 2022-07-19 RX ADMIN — FENTANYL CITRATE 25 MCG: 50 INJECTION, SOLUTION INTRAMUSCULAR; INTRAVENOUS at 16:49

## 2022-07-19 RX ADMIN — Medication 3 AMPULE: at 13:50

## 2022-07-19 ASSESSMENT — PAIN SCALES - GENERAL
PAINLEVEL_OUTOF10: 8
PAINLEVEL_OUTOF10: 7
PAINLEVEL_OUTOF10: 0
PAINLEVEL_OUTOF10: 8
PAINLEVEL_OUTOF10: 0

## 2022-07-19 ASSESSMENT — PAIN DESCRIPTION - LOCATION
LOCATION: BACK

## 2022-07-19 ASSESSMENT — PAIN DESCRIPTION - DESCRIPTORS
DESCRIPTORS: ACHING;SORE

## 2022-07-19 ASSESSMENT — LIFESTYLE VARIABLES: SMOKING_STATUS: 1

## 2022-07-19 NOTE — ANESTHESIA PRE PROCEDURE
Department of Anesthesiology  Preprocedure Note       Name:  Lelia Jones   Age:  65 y.o.  :  1945                                          MRN:  [id number]         Date:  2022      Surgeon: Wilfrid Chester):  Josafat Acosta III, MD    Procedure: Procedure(s):  LUMBAR 2, LUMBAR 4 KYPHOPLASTY AND ANY OTHER INDICATED LEVELS    Medications prior to admission:   Prior to Admission medications    Medication Sig Start Date End Date Taking?  Authorizing Provider   fluticasone-salmeterol (ADVAIR) 250-50 MCG/ACT AEPB diskus inhaler Inhale 1 puff into the lungs in the morning and at bedtime 22   Historical Provider, MD   calcitonin (MIACALCIN) 200 UNIT/ACT nasal spray 1 spray by Nasal route daily 6/3/22   AARON Aragon - CNP   albuterol sulfate  (90 Base) MCG/ACT inhaler USE 2 PUFFS BY INHALATION 4 TIMES DAILY AS NEEDED FOR WHEEZING OR SHORTNESS OF BREATH. 22   Ar Automatic Reconciliation   amiodarone (CORDARONE) 200 MG tablet Take 200 mg by mouth 2 times daily 3/10/22   Ar Automatic Reconciliation   apixaban (ELIQUIS) 5 MG TABS tablet Take 5 mg by mouth every 12 hours 3/10/22   Ar Automatic Reconciliation   ascorbic acid (VITAMIN C) 500 MG tablet Take 500 mg by mouth    Ar Automatic Reconciliation   carvedilol (COREG) 25 MG tablet Take 25 mg by mouth 2 times daily (with meals) 3/10/22   Ar Automatic Reconciliation   Cholecalciferol 50 MCG (2000 UT) TABS Take 2,000 Units by mouth    Ar Automatic Reconciliation   clopidogrel (PLAVIX) 75 MG tablet Take 75 mg by mouth daily 3/11/22   Ar Automatic Reconciliation   fluticasone (FLONASE) 50 MCG/ACT nasal spray USE 1 OR 2 SPRAYS IN EACH NOSTRIL EVERY DAY. 3/15/22   Ar Automatic Reconciliation   furosemide (LASIX) 40 MG tablet Take 20 mg by mouth daily TAKE 1 TABLET BY MOUTH EVERY DAY 22   Ar Automatic Reconciliation   glipiZIDE (GLUCOTROL XL) 10 MG extended release tablet TAKE 1 TABLET BY MOUTH TWICE DAILY 22   Ar Automatic Reconciliation   glucosamine-chondroitin 750-600 MG TABS tablet Take by mouth 2 times daily    Ar Automatic Reconciliation   guaiFENesin 1200 MG TB12 Take 1,200 mg by mouth 2 times daily as needed  Patient not taking: Reported on 7/19/2022    Ar Automatic Reconciliation   latanoprost (XALATAN) 0.005 % ophthalmic solution Apply 1 drop to eye    Ar Automatic Reconciliation   magnesium oxide (MAG-OX) 400 (240 Mg) MG tablet TAKE 1 TABLET BY MOUTH EVERY DAY 1/14/22   Ar Automatic Reconciliation   metFORMIN (GLUCOPHAGE) 500 MG tablet TAKE TWO TABLETS BY MOUTH 2 TIMES A DAY 3/23/22   Ar Automatic Reconciliation   montelukast (SINGULAIR) 10 MG tablet TAKE 1 TABLET BY MOUTH EVERY DAY AT BEDTIME 3/15/22   Ar Automatic Reconciliation   nitroGLYCERIN (NITROSTAT) 0.4 MG SL tablet Place 0.4 mg under the tongue  Patient not taking: Reported on 7/19/2022 3/11/22   Ar Automatic Reconciliation   polyethylene glycol (GLYCOLAX) 17 GM/SCOOP powder Take 17 g by mouth daily 9/21/21   Ar Automatic Reconciliation   rosuvastatin (CRESTOR) 10 MG tablet TAKE ONE TABLET BY MOUTH ONCE A DAY 2/7/22   Ar Automatic Reconciliation   SITagliptin (JANUVIA) 100 MG tablet TAKE 1 TABLET BY MOUTH EVERY DAY 3/15/22   Ar Automatic Reconciliation   valsartan (DIOVAN) 320 MG tablet Take 320 mg by mouth daily 3/10/22   Ar Automatic Reconciliation       Current medications:    Current Facility-Administered Medications   Medication Dose Route Frequency Provider Last Rate Last Admin    lactated ringers infusion   IntraVENous Continuous Cecile Jiménez  mL/hr at 07/19/22 1349 New Bag at 07/19/22 1349    ceFAZolin (ANCEF) 2000 mg in sterile water 20 mL IV syringe  2,000 mg IntraVENous On Call to OR MYRIAM Nj III, MD           Allergies:     Allergies   Allergen Reactions    Azithromycin Hives    Oxaprozin Other (See Comments)     ELEVATED BLOOD PRESSURE       Problem List:    Patient Active Problem List   Diagnosis Code    H/O endarterectomy Z98.890    Arterial degeneration I70.90    Atrial fibrillation ([facility]) T29.25    Complicated wound infection T14. 8XXA, L08.9    Allergic rhinitis J30.9    Elevated troponin R77.8    HTN (hypertension) I10    Pulmonary emphysema ([facility]) J43.9    Atherosclerosis of native arteries of extremity with intermittent claudication ([facility]) I70.219    Severe obesity (BMI 35.0-39. 9) with comorbidity ([facility]) E66.01    COPD (chronic obstructive pulmonary disease) ([facility]) J44.9    Personal history of tobacco use, presenting hazards to health Z87.891    Intermittent spinal claudication ([facility]) G95.19    Cigarette nicotine dependence in remission F17.211    Irregular heart beat I49.9    Acute metabolic encephalopathy T02.25    Chest pain R07.9    Hiatal hernia K44.9    Diabetic neuropathy ([facility]) E11.40    Normocytic anemia D64.9    Chronic pain of right knee M25.561, G89.29    Sleep apnea G47.30    Osteoarthritis M19.90    Encounter for long-term (current) drug use Z79.899    Ischemic cardiomyopathy I25.5    Ventricular tachycardia ([facility]) I47.2    VT (ventricular tachycardia) ([facility]) I47.2    Coronary artery disease involving native coronary artery of native heart without angina pectoris I25.10    Benign neoplasm of colon D12.6    PAD (peripheral artery disease) ([facility]) I73.9    Asthma J45.909    Orthostatic hypotension I95.1    Hyperlipidemia E78.5    S/P angioplasty with stent Z95.820    DM (diabetes mellitus) type II, controlled, with peripheral vascular disorder ([facility]) E11.51    Toe laceration S91.119A    Obstructive sleep apnea G47.33    MINI (acute kidney injury) ([facility] [address] N17.9    Type 2 diabetes with nephropathy ([facility]) E11.21    Hypoxia R09.02    Abnormal nuclear cardiac imaging test R93.1    PVC's (premature ventricular contractions) I49.3    Asbestos exposure Z77.090    Sepsis ([facility]) A41.9    ICD (implantable cardioverter-defibrillator) in place Z95.810    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, vertebra(e), init ([address] M80.08XA       Past Medical History:        Diagnosis Date    Acute respiratory failure with hypoxia ([address] 10/23/2014    MINI (acute kidney injury) ([address] 09/15/2014    MINI (acute kidney injury) ([address]     MINI (acute kidney injury) ([address]     Allergic rhinitis 11/10/2015    Asthma 11/10/2015    Benign essential hypertension 11/10/2015    Benign neoplasm of colon 11/10/2015    CAD (coronary artery disease) 11/10/2015    CAD (coronary artery disease) 1998, 1999    mix2, 3 stents [id number]    CAP (community acquired pneumonia) 12/31/2020    Cardiomyopathy ([address] 04/52/6163    Complicated wound infection 11/10/2015    COPD (chronic obstructive pulmonary disease) with emphysema ([address] 11/10/2015    COPD exacerbation ([address] 10/12/2011    COVID-19 12/31/2020    Diabetes mellitus type 2, controlled ([address] 11/10/2015    Diabetic neuropathy ([address] 11/10/2015    Disruption of wound, unspecified 10/09/2014    Encounter for long-term (current) use of other high-risk medications 11/10/2015    Extremity atherosclerosis with intermittent claudication ([address] 11/10/2015    H/O endarterectomy 11/10/2015    Hiatal hernia 11/10/2015    History of 2019 novel coronavirus disease (COVID-19)     12/31/2020-    Hyperglycemia due to type 1 diabetes mellitus ([address] 11/10/2015    Hyperlipidemia 11/10/2015    ICD (implantable cardioverter-defibrillator) in place 06/16/2022    Monomorphic ventricular tachycardia ([address] 12/31/2020    Myocardial infarction ([address] 1999    Myocardial infarction ([address] 11/10/2015    Obesity 11/10/2015    Orthostatic hypotension 11/10/2015    Osteoarthritis 11/10/2015    Peripheral vascular disease ([address] 11/10/2015    PNA (pneumonia) 02/08/2019    PVC (premature ventricular contraction)     Rash 66/77/4384    Seroma complicating a procedure 10/02/2014    Sleep apnea     cpap    Toe laceration     Type 2 diabetes with nephropathy [facility] [address]     Uncontrolled type 2 diabetes mellitus ([facility] [address] 11/10/2015    Vertiginous syndromes and other disorders of vestibular system 11/10/2015       Past Surgical History:        Procedure Laterality Date    CARDIAC CATHETERIZATION  12/05/2018    LV EF=40%. LM:nl. LAD:30-40% mid stenosis. LCX OM1:95% stenosis. RCA:100% occlusion at RV branch.  CORONARY ANGIOPLASTY WITH STENT PLACEMENT  12/05/2018    LCX OM1:2.5 x 12 Giuseppe RAKESH    CORONARY ANGIOPLASTY WITH STENT PLACEMENT  1999    ME ABDOMEN SURGERY PROC UNLISTED  1960    abdominal cyst removed   [address]    stents x3    VASCULAR SURGERY  11/5/14    Repair of right common femoral artery     VASCULAR SURGERY  9/11/14    tiffanie femoral endarterectomy       Social History:    Social History     Tobacco Use    Smoking status: Former     Packs/day: 1.00     Types: Cigarettes     Quit date: 10/2/2011     Years since quitting: 10.8    Smokeless tobacco: Current   Substance Use Topics    Alcohol use: Yes     Alcohol/week: 0.0 standard drinks                                Ready to quit: Not Answered  Counseling given: Not Answered      Vital Signs (Current):   Vitals:    07/13/22 1332 07/19/22 1344   BP:  (!) 152/70   Pulse:  63   Resp:  20   Temp:  99.2 °F (37.3 °C)   TempSrc:  Oral   SpO2:  94%   Weight: 264 lb (119.7 kg) 264 lb (119.7 kg)   Height: 6' 1\" (1.854 m) 6' 1\" (1.854 m)                                              BP Readings from Last 3 Encounters:   07/19/22 (!) 152/70   07/06/22 (!) 165/73   06/27/22 (!) 112/58       NPO Status: Time of last liquid consumption: 0000                        Time of last solid consumption: 0000                        Date of last liquid consumption: 07/18/22                        Date of last solid food consumption: 07/18/22    BMI:   Wt Readings from Last 3 Encounters:   07/19/22 264 lb (119.7 kg)   07/06/22 268 lb (121.6 kg)   06/27/22 268 lb (121.6 kg)     Body mass index is 34.83 kg/m².     CBC:   Lab Results   Component Value Date/Time    WBC 8.4 03/10/2022 07:47 AM    RBC 3.28 03/10/2022 07:47 AM    HGB 10.6 03/10/2022 07:47 AM    HCT 31.8 03/10/2022 07:47 AM    MCV 97.0 03/10/2022 07:47 AM    RDW 14.8 03/10/2022 07:47 AM     03/10/2022 07:47 AM       CMP:   Lab Results   Component Value Date/Time     03/10/2022 04:41 AM    K 4.1 03/10/2022 04:41 AM     03/10/2022 04:41 AM    CO2 27 03/10/2022 04:41 AM    BUN 18 03/10/2022 04:41 AM    CREATININE 1.10 03/10/2022 04:41 AM    GFRAA >60 03/10/2022 04:41 AM    AGRATIO 1.3 03/04/2022 03:57 PM    GLUCOSE 137 03/10/2022 04:41 AM    PROT 7.0 03/04/2022 03:57 PM    CALCIUM 8.6 03/10/2022 04:41 AM    BILITOT 0.6 03/04/2022 03:57 PM    ALKPHOS 57 03/04/2022 03:57 PM    AST 19 03/04/2022 03:57 PM    ALT 35 03/04/2022 03:57 PM       POC Tests:   Recent Labs     07/19/22  1348   POCGLU 167*   POCK 3.1*       Coags:   Lab Results   Component Value Date/Time    PROTIME 15.6 03/04/2022 03:57 PM    INR 1.2 03/04/2022 03:57 PM    APTT 24.3 03/04/2022 03:57 PM       HCG (If Applicable): No results found for: PREGTESTUR, PREGSERUM, HCG, HCGQUANT     ABGs:   Lab Results   Component Value Date/Time    PHART 7.43 05/11/2021 09:14 PM    PO2ART 51 05/11/2021 09:14 PM    FNA8QES 35.7 05/11/2021 09:14 PM    EJX3EXO 23.5 05/11/2021 09:14 PM    BEART 1 01/06/2021 10:51 AM        Type & Screen (If Applicable):  No results found for: LABABO, LABRH    Drug/Infectious Status (If Applicable):  No results found for: HIV, HEPCAB    COVID-19 Screening (If Applicable):   Lab Results   Component Value Date/Time    COVID19 Detected 12/31/2020 05:49 AM           Anesthesia Evaluation  Patient summary reviewed  Airway: Mallampati: III  TM distance: >3 FB   Neck ROM: limited  Mouth opening: > = 3 FB   Dental:          Pulmonary: breath sounds clear to auscultation  (+) COPD:  sleep apnea:  asthma: current smoker (Former)                          ROS comment: Hx Acute respiratory failure with hypoxia Cardiovascular:    (+) hypertension:, pacemaker: AICD, past MI:, CAD:, CABG/stent (Stents x 5):, dysrhythmias (Hx VT): atrial fibrillation and PVC, CHF (ICM): systolic, hyperlipidemia               ROS comment: DIRK 3/2022:    Left Ventricle: Left ventricle is moderately dilated. Mildly increased wall thickness. Akinesis of the following segments: basal inferior, basal inferolateral, mid inferior, mid inferolateral, apical lateral and apical inferior. Severely reduced left ventricular systolic function with a visually estimated EF of 30 - 35%.   Mitral Valve: Mild annular calcification of the mitral valve. Mild transvalvular regurgitation.   Left Atrium: Left atrium is moderately dilated. No left atrial appendage thrombus noted. No left atrial appendage mass noted.   Right Atrium: Right atrium is moderately dilated       Neuro/Psych:                ROS comment: Diabetic neuropathy GI/Hepatic/Renal:   (+) hiatal hernia, morbid obesity (severe)          Endo/Other:    (+) DiabetesType II DM, , .                 Abdominal:             Vascular:   + PVD, aortic or cerebral, . Other Findings:           Anesthesia Plan      general     ASA 4         arterial line    Anesthetic plan and risks discussed with patient and spouse.                         Janeen Medina MD   7/19/2022

## 2022-07-19 NOTE — DISCHARGE INSTRUCTIONS
Kyphoplasty Discharge Instructions    General information: This procedure is done to help with the back pain that is associated with compression fractures in the spine. The Kyphoplasty involves placing a balloon into the space of the vertebrae that is fractured, blowing up the balloon, therefore realigning the broken pieces of bone, and then injecting cement into the space to strengthen the vertebrae. The pain experienced from compression fractures is caused by the vertebrae not being stabilized. The cement stabilizes the bone, therefore reducing the pain. Home care instructions: You can resume your regular diet and medication regimen. Do not lift anything heavier than a gallon of milk or do anything strenuous for the next 24 hours. You will notice a dressing on your lower back after your procedure. This dressing can be removed in 24 hours. Showering is acceptable in 24 hours but you should refrain from tub baths or swimming for 5 days. Call if:   You should call your physician if you have any bleeding other than a small spot on your bandage. Call if you have any signs of infection, fever, or increased pain at the site. Call if you should have new or worsening pain in your back, or if you lose control of your bladder or bowel. Any tingling or loss of feeling or movement in your legs should also be reported. Medication Interaction:  During your procedure you potentially received a medication or medications which may reduce the effectiveness of oral contraceptives. Please consider other forms of contraception for 1 month following your procedure if you are currently using oral contraceptives as your primary form of birth control. In addition to this, we recommend continuing your oral contraceptive as prescribed, unless otherwise instructed by your physician, during this time.     After general anesthesia or intravenous sedation, for 24 hours or while taking prescription Narcotics:  Limit your activities  A responsible adult needs to be with you for the next 24 hours  Do not drive and operate hazardous machinery  Do not make important personal or business decisions  Do not drink alcoholic beverages  If you have not urinated within 8 hours after discharge, and you are experiencing discomfort from urinary retention, please go to the nearest ED. If you have sleep apnea and have a CPAP machine, please use it for all naps and sleeping. Please use caution when taking narcotics and any of your home medications that may cause drowsiness. *  Please give a list of your current medications to your Primary Care Provider. *  Please update this list whenever your medications are discontinued, doses are      changed, or new medications (including over-the-counter products) are added. *  Please carry medication information at all times in case of emergency situations. These are general instructions for a healthy lifestyle:  No smoking/ No tobacco products/ Avoid exposure to second hand smoke  Surgeon General's Warning:  Quitting smoking now greatly reduces serious risk to your health. Obesity, smoking, and sedentary lifestyle greatly increases your risk for illness  A healthy diet, regular physical exercise & weight monitoring are important for maintaining a healthy lifestyle    You may be retaining fluid if you have a history of heart failure or if you experience any of the following symptoms:  Weight gain of 3 pounds or more overnight or 5 pounds in a week, increased swelling in our hands or feet or shortness of breath while lying flat in bed. Please call your doctor as soon as you notice any of these symptoms; do not wait until your next office visit.

## 2022-07-19 NOTE — BRIEF OP NOTE
Brief Postoperative Note      Patient: Yunior Heard  YOB: 1945  MRN: [id number]    Date of Procedure: 7/19/2022    Pre-Op Diagnosis: 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, vertebra(e), init ([facility] [address] [M80.08XA]    Post-Op Diagnosis: L2 & L4 fractures       Procedure(s):  LUMBAR 2, LUMBAR 4 KYPHOPLASTY AND ANY OTHER INDICATED LEVELS    Surgeon(s):  Becky Oconnor MD    Assistant:  * No surgical staff found *    Anesthesia: General    Estimated Blood Loss (mL): minimal    Complications: None    Specimens:   ID Type Source Tests Collected by Time Destination   A : L2 Bone Biopsy Bone Spine SURGICAL PATHOLOGY Se Davenport III, MD 7/19/2022 1731        Implants:  * No implants in log *      Drains: * No LDAs found *    Findings: fractures    Electronically signed by Krishna Anguiano MD on 7/19/2022 at 6:00 PM

## 2022-07-19 NOTE — TELEPHONE ENCOUNTER
Spoke with Caitlin Tatum in the OR. Confusion about surgery wait list / depo and being able to see it. Caitlin Tatum put on [facility] surgery schedule and will contact pre op to call the patient with what time to arrive.  Verified with patient that he is NPO

## 2022-07-19 NOTE — ANESTHESIA PROCEDURE NOTES
Arterial Line:    An arterial line was placed using ultrasound guidance, in the OR for the following indication(s): continuous blood pressure monitoring. A 20 gauge (size), 1 and 1/4 inch (length), Arrow (type) catheter was placed, into the left radial artery, secured by Tegaderm. Anesthesia type: Local    Events:  patient tolerated procedure well with no complications. 7/19/2022 4:56 PM7/19/2022 5:02 PM  Other anesthesia staff: Carmen Olson RN

## 2022-07-19 NOTE — ANESTHESIA POSTPROCEDURE EVALUATION
Department of Anesthesiology  Postprocedure Note    Patient: Mehdi Khanna  MRN: [id number]  YOB: 1945  Date of evaluation: 7/19/2022      Procedure Summary     Date: 07/19/22 Room / Location: [facility] MAIN OR 10 / [facility] MAIN OR    Anesthesia Start: 5837 Anesthesia Stop: 0725    Procedure: LUMBAR 2, LUMBAR 4 KYPHOPLASTY AND ANY OTHER INDICATED LEVELS (Back) Diagnosis:       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, vertebra(e), init ([facility] [address]      (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, vertebra(e), init ([facility] [address] [M80.08XA])    Providers: Glenys Appiah MD Responsible Provider: Julien Ngo MD    Anesthesia Type: General ASA Status: 4          Anesthesia Type: General    Ana Phase I: Ana Score: 9    Ana Phase II:        Anesthesia Post Evaluation    Patient location during evaluation: PACU  Patient participation: complete - patient participated  Level of consciousness: awake and alert  Airway patency: patent  Nausea & Vomiting: no nausea and no vomiting  Complications: no  Cardiovascular status: hemodynamically stable  Respiratory status: acceptable  Hydration status: euvolemic  Comments: Blood pressure 138/66, pulse 64, temperature 98.9 °F (37.2 °C), temperature source Temporal, resp. rate 17, height 6' 1\" (1.854 m), weight 264 lb (119.7 kg), SpO2 94 %. AICD reactivated by pacer rep.       Pt stable for discharge from PACU  Multimodal analgesia pain management approach

## 2022-07-19 NOTE — ANESTHESIA PROCEDURE NOTES
Airway  Date/Time: 7/19/2022 5:09 PM  Urgency: elective    Airway not difficult    General Information and Staff    Patient location during procedure: OR  Other anesthesia staff: Lexie Joshi RN  Performed: other anesthesia staff     Indications and Patient Condition  Indications for airway management: anesthesia  Spontaneous Ventilation: absent  Sedation level: deep  Preoxygenated: yes  Patient position: sniffing  MILS not maintained throughout  Mask difficulty assessment: difficult bag mask (inadequate, unstable or two providers) +/- NMBA    Final Airway Details  Final airway type: endotracheal airway      Successful airway: ETT  Cuffed: yes   Successful intubation technique: video laryngoscopy  Facilitating devices/methods: intubating stylet  Endotracheal tube insertion site: oral  Blade: Mert  Blade size: #4  ETT size (mm): 8.0  Cormack-Lehane Classification: grade I - full view of glottis  Placement verified by: chest auscultation and capnometry   Inital cuff pressure (cm H2O): 10  Measured from: lips  ETT to lips (cm): 23  Number of attempts at approach: 1  Ventilation between attempts: bag mask

## 2022-07-20 ASSESSMENT — ENCOUNTER SYMPTOMS: SHORTNESS OF BREATH: 1

## 2022-07-20 NOTE — OP NOTE
[address] [place] [address]  OPERATIVE REPORT    Name:  Moises Carcamo  MR#:  [id number]  :  1945  ACCOUNT #:  [de-identified]  DATE OF SERVICE:  2022    PREOPERATIVE DIAGNOSIS:  Osteoporotic L2 and L4 compression fractures. POSTOPERATIVE DIAGNOSIS:  Osteoporotic L2 and L4 compression fractures. PROCEDURE PERFORMED:  Bilateral L2 and L4 kyphoplasty with L2 bone biopsy and procedure was carried out using biplanar C-arm fluoroscopy imaging, CPT codes 20174 and 71434. SURGEON:  Everett Hdz MD    ASSISTANT:  None. ANESTHESIA:  GETA. COMPLICATIONS:  None. SPECIMENS REMOVED:  L2 bone biopsy to Pathology for permanent evaluation. IMPLANTS:  None. ESTIMATED BLOOD LOSS:  Minimal.    FLUIDS:  300 mL crystalloid. DRAINS:  None. INDICATIONS:  The patient is a 13-year-old gentleman who has had an intractable back pain for approximately a month following lumbar fractures that failed to get better with medications and activity restriction and he is markedly limited on his ability to do any activities of daily living, so he is here for surgical treatment. PROCEDURE:  The patient was brought to operating room and after administration of anesthesia, IV antibiotics and placement of monitoring lines, he was positioned prone on the [facility] frame. His back was prepped with Betadine and sterilely draped. At this point, surgeon called a time-out and confirmed the procedure to be performed, his allergy history and the fact that he had received preoperative IV antibiotics. AP and lateral C-arms were brought in. We identified the L2 and L4 vertebral bodies and lined them up on AP and lateral views. We marked the lateral aspect of the pedicles on the skin bilaterally at both levels and then we made a stab wound bilaterally about a fingerbreadth lateral to the gladys at both levels.   We then inserted our starting trocars down to the lateral edge of the pedicle and checking under AP and lateral views, we inserted the trocars down to the pedicle and anchored them into the posterior aspect of the vertebral body being careful not to breech the medial cortex. We obtained a bone biopsy from the left side at L2 and then we drilled towards the anterior cortex bilaterally at L2 and L4. We then inserted our inflatable tamps and inflated the tamps. During this time, the methyl methacrylate was reconstituted and placed into the insertion devices. The inflatable tamps were then deflated and removed and then the insertion devices were inserted. We compressed the methyl methacrylate out into the void created by the bone tamps and got a relatively good filling at both levels. At L2, there was some extravasation out into a vein that wrapped back under into the disk space. This was a very thin line. We let the cement harden and once it had, we removed the bone fillers and then checked for tails. No tails were seen so the trocar sleeves were removed. A final AP lateral C-arm x-ray was obtained. We anesthetized the skin and subcutaneous tissue with 30 mL 0.5% Marcaine with epinephrine throughout all four levels. We then closed the skin incision with single stitch of 3-0 nylon. Dry sterile dressings were applied and then the patient was rolled supine onto the recovery room bed having tolerated the procedure well.       Patrizia Ortega MD      [id number]  D:  07/19/2022 18:19  T:  07/20/2022 5:05  JOB #:  [id number]

## 2022-07-21 ENCOUNTER — TELEPHONE (OUTPATIENT)
Dept: ORTHOPEDIC SURGERY | Age: 77
End: 2022-07-21

## 2022-07-21 NOTE — TELEPHONE ENCOUNTER
Try either number  for  pts  wife  Siria Tolentino. Pt  got home  from  [facility] and  fell.   They got him in the house  and  to  the  bed  and  they  have  since  gotten him into a  chair  ( this  was  2  days ago)  Please  call asap to  speak with  her

## 2022-07-21 NOTE — TELEPHONE ENCOUNTER
Called and spoke with wife. I suggested a muscle relaxer and she wants to check with PCP. She will have PCP send in rx if appropriate.

## 2022-07-25 ENCOUNTER — TELEPHONE (OUTPATIENT)
Dept: INTERNAL MEDICINE CLINIC | Facility: CLINIC | Age: 77
End: 2022-07-25

## 2022-07-25 ENCOUNTER — TELEPHONE (OUTPATIENT)
Dept: ORTHOPEDIC SURGERY | Age: 77
End: 2022-07-25

## 2022-07-25 DIAGNOSIS — M80.08XA AGE-REL OSTEOPOR W CURRENT PATH FRACTURE, VERTEBRA(E), INIT (HCC): ICD-10-CM

## 2022-07-25 RX ORDER — TIZANIDINE 2 MG/1
2 TABLET ORAL EVERY 8 HOURS PRN
Qty: 30 TABLET | Refills: 1 | Status: SHIPPED | OUTPATIENT
Start: 2022-07-25 | End: 2022-11-03

## 2022-07-25 NOTE — TELEPHONE ENCOUNTER
Call wife [phone] s/p back procedure and is having problems getting patient back and forth from Dr sheehan.  Anam getting in and out of the car

## 2022-07-25 NOTE — TELEPHONE ENCOUNTER
Pt wife Mary Ann Mandujano called and stated he will need them before his appointment tomorrow 07/26/22. Wife states that it is hard for her to get him to move around and ortho suggested a muscle relaxer.

## 2022-07-25 NOTE — TELEPHONE ENCOUNTER
----- Message from Maren Ryan sent at 7/21/2022  2:02 PM EDT -----  Subject: Message to Provider    QUESTIONS  Information for Provider? Patient had surgery done on July 19th Dr. Mirna Verdugo   did this and patient fell when first got home and Dr. Mirna Verdugo ph is [phone]   wants to know if Dr. Jimena Chapa would call in a muscle relaxer for him? call   patient back about this. Ortho is now allowed to write those scripts.   ---------------------------------------------------------------------------  --------------  0121 1Cast  [phone]; Do not leave any message, patient will call back for answer  ---------------------------------------------------------------------------  --------------  SCRIPT ANSWERS  Relationship to Patient? Third Party  Third Party Type? Other  Other Third Party Type? wife  Representative Name?  Lisbet White

## 2022-07-27 ENCOUNTER — TELEPHONE (OUTPATIENT)
Dept: INTERNAL MEDICINE CLINIC | Facility: CLINIC | Age: 77
End: 2022-07-27

## 2022-07-27 ENCOUNTER — OFFICE VISIT (OUTPATIENT)
Dept: ORTHOPEDIC SURGERY | Age: 77
End: 2022-07-27

## 2022-07-27 ENCOUNTER — TELEPHONE (OUTPATIENT)
Dept: ORTHOPEDIC SURGERY | Age: 77
End: 2022-07-27

## 2022-07-27 ENCOUNTER — APPOINTMENT (OUTPATIENT)
Dept: GENERAL RADIOLOGY | Age: 77
DRG: 371 | End: 2022-07-27
Payer: MEDICARE

## 2022-07-27 ENCOUNTER — HOSPITAL ENCOUNTER (INPATIENT)
Age: 77
LOS: 12 days | Discharge: NURSING, REHAB OR LONG-TERM CARE | DRG: 371 | End: 2022-08-08
Attending: EMERGENCY MEDICINE | Admitting: INTERNAL MEDICINE
Payer: MEDICARE

## 2022-07-27 ENCOUNTER — APPOINTMENT (OUTPATIENT)
Dept: CT IMAGING | Age: 77
DRG: 371 | End: 2022-07-27
Payer: MEDICARE

## 2022-07-27 DIAGNOSIS — Z09 POSTOPERATIVE FOLLOW-UP: Primary | ICD-10-CM

## 2022-07-27 DIAGNOSIS — R53.1 GENERAL WEAKNESS: ICD-10-CM

## 2022-07-27 DIAGNOSIS — M54.50 LOW BACK PAIN, UNSPECIFIED BACK PAIN LATERALITY, UNSPECIFIED CHRONICITY, UNSPECIFIED WHETHER SCIATICA PRESENT: ICD-10-CM

## 2022-07-27 DIAGNOSIS — M54.50 LOW BACK PAIN WITHOUT SCIATICA, UNSPECIFIED BACK PAIN LATERALITY, UNSPECIFIED CHRONICITY: ICD-10-CM

## 2022-07-27 DIAGNOSIS — N30.00 ACUTE CYSTITIS WITHOUT HEMATURIA: ICD-10-CM

## 2022-07-27 DIAGNOSIS — I49.3 PVC'S (PREMATURE VENTRICULAR CONTRACTIONS): Primary | ICD-10-CM

## 2022-07-27 PROBLEM — D64.9 ANEMIA: Chronic | Status: ACTIVE | Noted: 2022-07-27

## 2022-07-27 PROBLEM — R74.8 ELEVATED LIVER ENZYMES: Status: ACTIVE | Noted: 2022-07-27

## 2022-07-27 PROBLEM — Z78.9 DNI (DO NOT INTUBATE): Chronic | Status: ACTIVE | Noted: 2022-07-27

## 2022-07-27 PROBLEM — G47.33 OBSTRUCTIVE SLEEP APNEA: Status: ACTIVE | Noted: 2017-08-11

## 2022-07-27 PROBLEM — N39.0 UTI (URINARY TRACT INFECTION): Status: ACTIVE | Noted: 2022-07-27

## 2022-07-27 PROBLEM — I47.20 VENTRICULAR TACHYCARDIA (HCC): Status: ACTIVE | Noted: 2022-03-04

## 2022-07-27 PROBLEM — I48.91 ATRIAL FIBRILLATION (HCC): Status: ACTIVE | Noted: 2022-03-04

## 2022-07-27 PROBLEM — G95.19 INTERMITTENT SPINAL CLAUDICATION (HCC): Status: ACTIVE | Noted: 2017-06-13

## 2022-07-27 PROBLEM — M54.9 BACK PAIN: Status: ACTIVE | Noted: 2022-07-27

## 2022-07-27 PROBLEM — E87.1 HYPONATREMIA: Status: ACTIVE | Noted: 2022-07-27

## 2022-07-27 LAB
ALBUMIN SERPL-MCNC: 2.2 G/DL (ref 3.2–4.6)
ALBUMIN/GLOB SERPL: 0.4 {RATIO} (ref 1.2–3.5)
ALP SERPL-CCNC: 83 U/L (ref 50–136)
ALT SERPL-CCNC: 94 U/L (ref 12–65)
ANION GAP SERPL CALC-SCNC: 6 MMOL/L (ref 7–16)
APPEARANCE UR: CLEAR
AST SERPL-CCNC: 92 U/L (ref 15–37)
BACTERIA URNS QL MICRO: NEGATIVE /HPF
BASOPHILS # BLD: 0.1 K/UL (ref 0–0.2)
BASOPHILS NFR BLD: 1 % (ref 0–2)
BILIRUB SERPL-MCNC: 1.1 MG/DL (ref 0.2–1.1)
BILIRUB UR QL: NEGATIVE
BUN SERPL-MCNC: 17 MG/DL (ref 8–23)
CALCIUM SERPL-MCNC: 8.5 MG/DL (ref 8.3–10.4)
CASTS URNS QL MICRO: ABNORMAL /LPF
CHLORIDE SERPL-SCNC: 96 MMOL/L (ref 98–107)
CO2 SERPL-SCNC: 30 MMOL/L (ref 21–32)
COLOR UR: ABNORMAL
CREAT SERPL-MCNC: 0.9 MG/DL (ref 0.8–1.5)
DIFFERENTIAL METHOD BLD: ABNORMAL
EOSINOPHIL # BLD: 0 K/UL (ref 0–0.8)
EOSINOPHIL NFR BLD: 0 % (ref 0.5–7.8)
EPI CELLS #/AREA URNS HPF: ABNORMAL /HPF
ERYTHROCYTE [DISTWIDTH] IN BLOOD BY AUTOMATED COUNT: 14.8 % (ref 11.9–14.6)
GLOBULIN SER CALC-MCNC: 4.9 G/DL (ref 2.3–3.5)
GLUCOSE BLD STRIP.AUTO-MCNC: 109 MG/DL (ref 65–100)
GLUCOSE SERPL-MCNC: 90 MG/DL (ref 65–100)
GLUCOSE UR STRIP.AUTO-MCNC: NEGATIVE MG/DL
HCT VFR BLD AUTO: 31.6 % (ref 41.1–50.3)
HGB BLD-MCNC: 10.6 G/DL (ref 13.6–17.2)
HGB UR QL STRIP: NEGATIVE
IMM GRANULOCYTES # BLD AUTO: 0.2 K/UL (ref 0–0.5)
IMM GRANULOCYTES NFR BLD AUTO: 2 % (ref 0–5)
KETONES UR QL STRIP.AUTO: ABNORMAL MG/DL
LEUKOCYTE ESTERASE UR QL STRIP.AUTO: ABNORMAL
LYMPHOCYTES # BLD: 0.9 K/UL (ref 0.5–4.6)
LYMPHOCYTES NFR BLD: 8 % (ref 13–44)
MCH RBC QN AUTO: 32.3 PG (ref 26.1–32.9)
MCHC RBC AUTO-ENTMCNC: 33.5 G/DL (ref 31.4–35)
MCV RBC AUTO: 96.3 FL (ref 79.6–97.8)
MONOCYTES # BLD: 1.1 K/UL (ref 0.1–1.3)
MONOCYTES NFR BLD: 10 % (ref 4–12)
NEUTS SEG # BLD: 8.6 K/UL (ref 1.7–8.2)
NEUTS SEG NFR BLD: 80 % (ref 43–78)
NITRITE UR QL STRIP.AUTO: NEGATIVE
NRBC # BLD: 0 K/UL (ref 0–0.2)
PH UR STRIP: 5.5 [PH] (ref 5–9)
PLATELET # BLD AUTO: 305 K/UL (ref 150–450)
PMV BLD AUTO: 10.9 FL (ref 9.4–12.3)
POTASSIUM SERPL-SCNC: 4.4 MMOL/L (ref 3.5–5.1)
PROT SERPL-MCNC: 7.1 G/DL (ref 6.3–8.2)
PROT UR STRIP-MCNC: ABNORMAL MG/DL
RBC # BLD AUTO: 3.28 M/UL (ref 4.23–5.6)
RBC #/AREA URNS HPF: ABNORMAL /HPF
SARS-COV-2 RDRP RESP QL NAA+PROBE: NOT DETECTED
SERVICE CMNT-IMP: ABNORMAL
SODIUM SERPL-SCNC: 132 MMOL/L (ref 138–145)
SOURCE: NORMAL
SP GR UR REFRACTOMETRY: 1.02 (ref 1–1.02)
UROBILINOGEN UR QL STRIP.AUTO: 1 EU/DL (ref 0.2–1)
WBC # BLD AUTO: 10.8 K/UL (ref 4.3–11.1)
WBC URNS QL MICRO: ABNORMAL /HPF

## 2022-07-27 PROCEDURE — 36415 COLL VENOUS BLD VENIPUNCTURE: CPT

## 2022-07-27 PROCEDURE — 87635 SARS-COV-2 COVID-19 AMP PRB: CPT

## 2022-07-27 PROCEDURE — 85025 COMPLETE CBC W/AUTO DIFF WBC: CPT

## 2022-07-27 PROCEDURE — 72131 CT LUMBAR SPINE W/O DYE: CPT

## 2022-07-27 PROCEDURE — 83735 ASSAY OF MAGNESIUM: CPT

## 2022-07-27 PROCEDURE — 87186 SC STD MICRODIL/AGAR DIL: CPT

## 2022-07-27 PROCEDURE — 99024 POSTOP FOLLOW-UP VISIT: CPT | Performed by: ORTHOPAEDIC SURGERY

## 2022-07-27 PROCEDURE — 6360000002 HC RX W HCPCS: Performed by: EMERGENCY MEDICINE

## 2022-07-27 PROCEDURE — 2500000003 HC RX 250 WO HCPCS: Performed by: INTERNAL MEDICINE

## 2022-07-27 PROCEDURE — 1100000000 HC RM PRIVATE

## 2022-07-27 PROCEDURE — 83036 HEMOGLOBIN GLYCOSYLATED A1C: CPT

## 2022-07-27 PROCEDURE — 87086 URINE CULTURE/COLONY COUNT: CPT

## 2022-07-27 PROCEDURE — 2580000003 HC RX 258: Performed by: INTERNAL MEDICINE

## 2022-07-27 PROCEDURE — 96361 HYDRATE IV INFUSION ADD-ON: CPT

## 2022-07-27 PROCEDURE — 81001 URINALYSIS AUTO W/SCOPE: CPT

## 2022-07-27 PROCEDURE — 6370000000 HC RX 637 (ALT 250 FOR IP): Performed by: EMERGENCY MEDICINE

## 2022-07-27 PROCEDURE — 71045 X-RAY EXAM CHEST 1 VIEW: CPT

## 2022-07-27 PROCEDURE — 96374 THER/PROPH/DIAG INJ IV PUSH: CPT

## 2022-07-27 PROCEDURE — 99285 EMERGENCY DEPT VISIT HI MDM: CPT

## 2022-07-27 PROCEDURE — 80053 COMPREHEN METABOLIC PANEL: CPT

## 2022-07-27 PROCEDURE — 80048 BASIC METABOLIC PNL TOTAL CA: CPT

## 2022-07-27 PROCEDURE — 87088 URINE BACTERIA CULTURE: CPT

## 2022-07-27 PROCEDURE — 6370000000 HC RX 637 (ALT 250 FOR IP): Performed by: INTERNAL MEDICINE

## 2022-07-27 PROCEDURE — 2580000003 HC RX 258: Performed by: EMERGENCY MEDICINE

## 2022-07-27 PROCEDURE — 82962 GLUCOSE BLOOD TEST: CPT

## 2022-07-27 RX ORDER — INSULIN LISPRO 100 [IU]/ML
0-4 INJECTION, SOLUTION INTRAVENOUS; SUBCUTANEOUS
Status: DISCONTINUED | OUTPATIENT
Start: 2022-07-28 | End: 2022-08-08

## 2022-07-27 RX ORDER — SODIUM CHLORIDE 0.9 % (FLUSH) 0.9 %
5-40 SYRINGE (ML) INJECTION PRN
Status: DISCONTINUED | OUTPATIENT
Start: 2022-07-27 | End: 2022-08-08 | Stop reason: HOSPADM

## 2022-07-27 RX ORDER — SODIUM CHLORIDE 9 MG/ML
INJECTION, SOLUTION INTRAVENOUS PRN
Status: DISCONTINUED | OUTPATIENT
Start: 2022-07-27 | End: 2022-08-08 | Stop reason: HOSPADM

## 2022-07-27 RX ORDER — CLOPIDOGREL BISULFATE 75 MG/1
75 TABLET ORAL DAILY
Status: DISCONTINUED | OUTPATIENT
Start: 2022-07-28 | End: 2022-08-08 | Stop reason: HOSPADM

## 2022-07-27 RX ORDER — ACETAMINOPHEN 325 MG/1
650 TABLET ORAL EVERY 6 HOURS PRN
Status: DISCONTINUED | OUTPATIENT
Start: 2022-07-27 | End: 2022-08-08 | Stop reason: HOSPADM

## 2022-07-27 RX ORDER — ROSUVASTATIN CALCIUM 10 MG/1
10 TABLET, COATED ORAL DAILY
Status: DISCONTINUED | OUTPATIENT
Start: 2022-07-28 | End: 2022-08-08 | Stop reason: HOSPADM

## 2022-07-27 RX ORDER — ACETAMINOPHEN 650 MG/1
650 SUPPOSITORY RECTAL EVERY 6 HOURS PRN
Status: DISCONTINUED | OUTPATIENT
Start: 2022-07-27 | End: 2022-08-08 | Stop reason: HOSPADM

## 2022-07-27 RX ORDER — FUROSEMIDE 20 MG/1
20 TABLET ORAL DAILY
Status: DISCONTINUED | OUTPATIENT
Start: 2022-07-28 | End: 2022-08-08 | Stop reason: HOSPADM

## 2022-07-27 RX ORDER — POLYETHYLENE GLYCOL 3350 17 G/17G
17 POWDER, FOR SOLUTION ORAL DAILY PRN
Status: DISCONTINUED | OUTPATIENT
Start: 2022-07-27 | End: 2022-08-08 | Stop reason: HOSPADM

## 2022-07-27 RX ORDER — CARVEDILOL 25 MG/1
25 TABLET ORAL 2 TIMES DAILY WITH MEALS
Status: DISCONTINUED | OUTPATIENT
Start: 2022-07-28 | End: 2022-08-08 | Stop reason: HOSPADM

## 2022-07-27 RX ORDER — AMIODARONE HYDROCHLORIDE 200 MG/1
200 TABLET ORAL 2 TIMES DAILY
Status: DISCONTINUED | OUTPATIENT
Start: 2022-07-27 | End: 2022-08-01

## 2022-07-27 RX ORDER — ACETAMINOPHEN 500 MG
1000 TABLET ORAL
Status: COMPLETED | OUTPATIENT
Start: 2022-07-27 | End: 2022-07-27

## 2022-07-27 RX ORDER — ONDANSETRON 4 MG/1
4 TABLET, ORALLY DISINTEGRATING ORAL EVERY 8 HOURS PRN
Status: DISCONTINUED | OUTPATIENT
Start: 2022-07-27 | End: 2022-08-08 | Stop reason: HOSPADM

## 2022-07-27 RX ORDER — FLUTICASONE PROPIONATE 50 MCG
2 SPRAY, SUSPENSION (ML) NASAL DAILY
Status: DISCONTINUED | OUTPATIENT
Start: 2022-07-28 | End: 2022-08-08 | Stop reason: HOSPADM

## 2022-07-27 RX ORDER — VALSARTAN 320 MG/1
320 TABLET ORAL DAILY
Status: DISCONTINUED | OUTPATIENT
Start: 2022-07-28 | End: 2022-08-08 | Stop reason: HOSPADM

## 2022-07-27 RX ORDER — SODIUM CHLORIDE 0.9 % (FLUSH) 0.9 %
5-40 SYRINGE (ML) INJECTION EVERY 12 HOURS SCHEDULED
Status: DISCONTINUED | OUTPATIENT
Start: 2022-07-27 | End: 2022-08-08 | Stop reason: HOSPADM

## 2022-07-27 RX ORDER — INSULIN LISPRO 100 [IU]/ML
0-4 INJECTION, SOLUTION INTRAVENOUS; SUBCUTANEOUS NIGHTLY
Status: DISCONTINUED | OUTPATIENT
Start: 2022-07-27 | End: 2022-08-08

## 2022-07-27 RX ORDER — ALBUTEROL SULFATE 90 UG/1
2 AEROSOL, METERED RESPIRATORY (INHALATION) EVERY 4 HOURS PRN
Status: DISCONTINUED | OUTPATIENT
Start: 2022-07-27 | End: 2022-08-08 | Stop reason: HOSPADM

## 2022-07-27 RX ORDER — HYDROCODONE BITARTRATE AND ACETAMINOPHEN 7.5; 325 MG/1; MG/1
1 TABLET ORAL EVERY 6 HOURS PRN
Qty: 28 TABLET | Refills: 0 | Status: SHIPPED | OUTPATIENT
Start: 2022-07-27 | End: 2022-08-03

## 2022-07-27 RX ORDER — DEXTROSE MONOHYDRATE 100 MG/ML
INJECTION, SOLUTION INTRAVENOUS CONTINUOUS PRN
Status: DISCONTINUED | OUTPATIENT
Start: 2022-07-27 | End: 2022-08-08 | Stop reason: HOSPADM

## 2022-07-27 RX ORDER — LATANOPROST 50 UG/ML
1 SOLUTION/ DROPS OPHTHALMIC DAILY
Status: DISCONTINUED | OUTPATIENT
Start: 2022-07-28 | End: 2022-08-08 | Stop reason: HOSPADM

## 2022-07-27 RX ORDER — CALCITONIN SALMON 200 [IU]/.09ML
1 SPRAY, METERED NASAL DAILY
Status: DISCONTINUED | OUTPATIENT
Start: 2022-07-28 | End: 2022-08-08 | Stop reason: HOSPADM

## 2022-07-27 RX ORDER — ONDANSETRON 2 MG/ML
4 INJECTION INTRAMUSCULAR; INTRAVENOUS EVERY 6 HOURS PRN
Status: DISCONTINUED | OUTPATIENT
Start: 2022-07-27 | End: 2022-08-08 | Stop reason: HOSPADM

## 2022-07-27 RX ORDER — SODIUM CHLORIDE, SODIUM LACTATE, POTASSIUM CHLORIDE, AND CALCIUM CHLORIDE .6; .31; .03; .02 G/100ML; G/100ML; G/100ML; G/100ML
1000 INJECTION, SOLUTION INTRAVENOUS ONCE
Status: COMPLETED | OUTPATIENT
Start: 2022-07-27 | End: 2022-07-27

## 2022-07-27 RX ORDER — MONTELUKAST SODIUM 10 MG/1
10 TABLET ORAL NIGHTLY
Status: DISCONTINUED | OUTPATIENT
Start: 2022-07-27 | End: 2022-08-08 | Stop reason: HOSPADM

## 2022-07-27 RX ADMIN — MONTELUKAST 10 MG: 10 TABLET, FILM COATED ORAL at 23:57

## 2022-07-27 RX ADMIN — SODIUM CHLORIDE, POTASSIUM CHLORIDE, SODIUM LACTATE AND CALCIUM CHLORIDE 1000 ML: 600; 310; 30; 20 INJECTION, SOLUTION INTRAVENOUS at 20:16

## 2022-07-27 RX ADMIN — TUBERCULIN PURIFIED PROTEIN DERIVATIVE 5 UNITS: 5 INJECTION, SOLUTION INTRADERMAL at 23:59

## 2022-07-27 RX ADMIN — AMIODARONE HYDROCHLORIDE 200 MG: 200 TABLET ORAL at 23:57

## 2022-07-27 RX ADMIN — APIXABAN 5 MG: 5 TABLET, FILM COATED ORAL at 23:57

## 2022-07-27 RX ADMIN — ACETAMINOPHEN 1000 MG: 500 TABLET, FILM COATED ORAL at 18:49

## 2022-07-27 RX ADMIN — SODIUM CHLORIDE, PRESERVATIVE FREE 10 ML: 5 INJECTION INTRAVENOUS at 23:58

## 2022-07-27 RX ADMIN — CEFTRIAXONE 1000 MG: 1 INJECTION, POWDER, FOR SOLUTION INTRAMUSCULAR; INTRAVENOUS at 21:44

## 2022-07-27 ASSESSMENT — PAIN SCALES - GENERAL
PAINLEVEL_OUTOF10: 7
PAINLEVEL_OUTOF10: 10

## 2022-07-27 ASSESSMENT — PAIN DESCRIPTION - ORIENTATION: ORIENTATION: LOWER

## 2022-07-27 ASSESSMENT — ENCOUNTER SYMPTOMS
COUGH: 0
SHORTNESS OF BREATH: 0
BACK PAIN: 0
VOMITING: 0
NAUSEA: 0
CHEST TIGHTNESS: 0
DIARRHEA: 0
EYE DISCHARGE: 0
ABDOMINAL PAIN: 0

## 2022-07-27 ASSESSMENT — PAIN DESCRIPTION - LOCATION: LOCATION: BACK

## 2022-07-27 ASSESSMENT — PAIN - FUNCTIONAL ASSESSMENT: PAIN_FUNCTIONAL_ASSESSMENT: 0-10

## 2022-07-27 NOTE — PROGRESS NOTES
Name: Ronni Sweeney  YOB: 1945  Gender: male  MRN: [id number]    DATE: 7/27/2022    CC: Post-Op Check       HPI this is a 1 week postop visit on patient Fiordaliza Mohan following an L2 and L4 kyphoplasty. He states that he is no better as far as pain is concerned and he feels like he is getting weaker on a daily basis because of inactivity. The pain is constant but it is worsened with movement. We did a bone biopsy and it was negative for any cancer. I thought his initial MRI scan was a little unusual and that it showed signal change consistent with fracture in L2 and L4 but there was no significant bony compression. RADIOGRAPHS: AP lateral lumbar x-ray from today 7/27/2022 shows the L2 and L4 kyphoplasties but I do not see any new obvious fractures. His AP and sagittal alignment are reasonable. PE: Patient is an elderly obese gentleman who appears to be in a lot of discomfort. I palpated spine he had no tenderness in the thoracic area or the sacral area and notable tenderness to the mid lumbar region. Hip range of motion was normal without pain.     CURRENT MEDS:     Current Outpatient Medications:     tiZANidine (ZANAFLEX) 2 MG tablet, Take 1 tablet by mouth every 8 hours as needed (muscle spasms), Disp: 30 tablet, Rfl: 1    fluticasone-salmeterol (ADVAIR) 250-50 MCG/ACT AEPB diskus inhaler, Inhale 1 puff into the lungs in the morning and at bedtime, Disp: , Rfl:     calcitonin (MIACALCIN) 200 UNIT/ACT nasal spray, 1 spray by Nasal route daily, Disp: 1 each, Rfl: 1    albuterol sulfate  (90 Base) MCG/ACT inhaler, USE 2 PUFFS BY INHALATION 4 TIMES DAILY AS NEEDED FOR WHEEZING OR SHORTNESS OF BREATH., Disp: , Rfl:     amiodarone (CORDARONE) 200 MG tablet, Take 200 mg by mouth 2 times daily, Disp: , Rfl:     apixaban (ELIQUIS) 5 MG TABS tablet, Take 5 mg by mouth every 12 hours, Disp: , Rfl:     ascorbic acid (VITAMIN C) 500 MG tablet, Take 500 mg by mouth, Disp: , Rfl: carvedilol (COREG) 25 MG tablet, Take 25 mg by mouth 2 times daily (with meals), Disp: , Rfl:     Cholecalciferol 50 MCG (2000 UT) TABS, Take 2,000 Units by mouth, Disp: , Rfl:     clopidogrel (PLAVIX) 75 MG tablet, Take 75 mg by mouth daily, Disp: , Rfl:     fluticasone (FLONASE) 50 MCG/ACT nasal spray, USE 1 OR 2 SPRAYS IN EACH NOSTRIL EVERY DAY., Disp: , Rfl:     furosemide (LASIX) 40 MG tablet, Take 20 mg by mouth daily TAKE 1 TABLET BY MOUTH EVERY DAY, Disp: , Rfl:     glipiZIDE (GLUCOTROL XL) 10 MG extended release tablet, TAKE 1 TABLET BY MOUTH TWICE DAILY, Disp: , Rfl:     glucosamine-chondroitin 750-600 MG TABS tablet, Take by mouth 2 times daily, Disp: , Rfl:     guaiFENesin 1200 MG TB12, Take 1,200 mg by mouth 2 times daily as needed, Disp: , Rfl:     latanoprost (XALATAN) 0.005 % ophthalmic solution, Apply 1 drop to eye, Disp: , Rfl:     magnesium oxide (MAG-OX) 400 (240 Mg) MG tablet, TAKE 1 TABLET BY MOUTH EVERY DAY, Disp: , Rfl:     metFORMIN (GLUCOPHAGE) 500 MG tablet, TAKE TWO TABLETS BY MOUTH 2 TIMES A DAY, Disp: , Rfl:     montelukast (SINGULAIR) 10 MG tablet, TAKE 1 TABLET BY MOUTH EVERY DAY AT BEDTIME, Disp: , Rfl:     nitroGLYCERIN (NITROSTAT) 0.4 MG SL tablet, Place 0.4 mg under the tongue, Disp: , Rfl:     polyethylene glycol (GLYCOLAX) 17 GM/SCOOP powder, Take 17 g by mouth daily, Disp: , Rfl:     rosuvastatin (CRESTOR) 10 MG tablet, TAKE ONE TABLET BY MOUTH ONCE A DAY, Disp: , Rfl:     SITagliptin (JANUVIA) 100 MG tablet, TAKE 1 TABLET BY MOUTH EVERY DAY, Disp: , Rfl:     valsartan (DIOVAN) 320 MG tablet, Take 320 mg by mouth daily, Disp: , Rfl:    Allergies   Allergen Reactions    Azithromycin Hives    Oxaprozin Other (See Comments)     ELEVATED BLOOD PRESSURE       ASSESSMENT/PLAN: This gentleman really has had no relief from his L2 and L4 kyphoplasties and I am not sure why he is still in such significant pain.   I think we need to get a new lumbar MRI scan to evaluate and he has a pacemaker/defibrillator that will need to be turned off prior to the MRI scan. I will see him back after the MRI. Mirela Temple was seen today for post-op check. Diagnoses and all orders for this visit:    Postoperative follow-up  -     XR LUMBAR SPINE (2-3 VIEWS); Future  -     MRI LUMBAR SPINE WO CONTRAST; Future    Low back pain, unspecified back pain laterality, unspecified chronicity, unspecified whether sciatica present  -     MRI LUMBAR SPINE WO CONTRAST; Future             No follow-up provider specified. Electronically signed by Jassi Rico MD        Elements of this note were created using speech recognition software. As such, errors of speech recognition may be present.

## 2022-07-27 NOTE — ED TRIAGE NOTES
Pt had back surgery last week. Surgery on L1, 2, and 4. Pt reports diarrhea for 1 week. Pt has trouble ambulating and fell today but no LOC. Minor skin tares on left arm. Pt reports pain in lower back from surgery. /63, HR 66, RR 16, O2 95. .

## 2022-07-27 NOTE — TELEPHONE ENCOUNTER
Pt  was  here this morning-  He  got  home  and  fell again ( sat  down)  Wife  called  EMS  they  have  taken him to  the  hospital.  She  has  tried to  call dr Simmons's  office  so they  will  admit  him-   Ph [phone] dr Mariela Knight-  but  she hasnt  heard  from them. Wife Andres French  says  they may  bring  him  back  from the  hospital.  She  isnt  sure  what they  are  going  to do. She  didn't  know  if  you can  try  to  call dr Mariela Knight  also?     If  pt  gets  admitted-  wife  wonders  if  he  can order  an mri while in the  hospital     Please  call her

## 2022-07-27 NOTE — TELEPHONE ENCOUNTER
Please call and check on this 07/28/22. He is in ER right now. Want to know which PT he wants to go to. Also, MRI should be cared for by Dr. Angeline Christianson. Thanks.   Dung Arambula

## 2022-07-27 NOTE — LETTER
Lelia Jones  1945  65 y.o.  [id number]    Diagnosis Code:                              RT                  LT                  BILAT    DDDZ   LBP   L/S NEURITIS   L SPRAIN   SPONDY   L STENOSIS   L DISC   POST CRUZ   CRONIC PAIN    SCOLI   LIMB PAIN   TH PAIN   SI JOINT   C DDDZ   C STENOSIS   C DISC   BRACH NEUR   NECK PAIN    CTS   COCCYX   OSTEO   COMP FX   HIP BURS   POST FUS   POST NON   SH PAIN   ARM PAIN    OTHER____________________________________________________________      Radiographic Studies:    CERV/ THORACIC/ LUMBAR MRI       WITH/  W/O  Contrast              _____________________Discogram    CT /Myelogram of _______________                  NCS/EMG  Upper / Lower Extremity________________    MRI of _______________________                     Other______________________    Injections:     ________________________ESI/ SNRB/ FACET                     _______________________Rhizotomies     Authorization to stop blood thinners: _____________________________________________________      Medications:    __________________Sterpred DS                                    ___________________Gabapentin QD/ BID/ TID    __________________Norco/Tramadol   QD / BID / TID    ____________________NSAIDS  QD / BID / TID    __________________Flexeril QD/ BID/ TID                           ____________________Other      Visit Charges:    Recheck 2           Recheck 3               Recheck 4              Recheck 5      INJ ______________________    New PT 2            New PT 3                New PT 4               New PT 5          Pre-op / Post-op      Physical Therapy:    Lumbar  /  Cervical                     ___________________________ (Traction / Ultrasound, Dry Needling)       Referral: Guerry Render /  [facility]   /OTHER: __________________________________________      Follow-up with SEL______________________________________________________________      Work Note: _____________________________________________________________________

## 2022-07-27 NOTE — TELEPHONE ENCOUNTER
Pt wife called and stated that pt needs a MRI due to the issues that he is having and said that it needs to be approved through Dr. Trisha Alanis. Also, pt wife would like pt to begin getting physical therapy.

## 2022-07-27 NOTE — ED PROVIDER NOTES
[facility] Emergency Department Provider Note                   PCP:                Amari Pereira MD               Age: 65 y.o. Sex: male       ICD-10-CM    1. General weakness  R53.1       2. Acute cystitis without hematuria  N30.00       3. Low back pain without sciatica, unspecified back pain laterality, unspecified chronicity  M54.50           DISPOSITION Admitted 07/27/2022 10:24:19 PM       MDM  Number of Diagnoses or Management Options  Acute cystitis without hematuria  General weakness  Low back pain without sciatica, unspecified back pain laterality, unspecified chronicity  Diagnosis management comments: 54-year-old male presents for generalized weakness as well as continued lumbar pain. Vital signs are reviewed. Patient states that ever since his surgery he seems to not be doing well. Looking through his chart the patient is set for referral to rehab facility. However my concern is that he is now unable to ambulate due to the weakness. He has no Red flags for acute low back pain. Generalized weakness work-up here was initiated. Patient CT scan of his lumbar region was obtained. CBC, CMP here for unremarkable he is a questionable urinary tract infection. CT lumbar shows postoperative changes of his kyphoplasty. Multiple areas of spinal Stenosis. With the patient being weak and unable to walk with a lower extremity weakness. I do think the patient benefit from admission with possible MRI. Spoke with the hospitalist who is agreeable for mission.          Orders Placed This Encounter   Procedures    COVID-19, Rapid    Culture, Urine    XR CHEST PORTABLE    CT LUMBAR SPINE WO CONTRAST    MRI LUMBAR SPINE WO CONTRAST    MRI BRAIN WO CONTRAST    MRI CERVICAL SPINE WO CONTRAST    CBC with Auto Differential    CMP    Urinalysis    Basic Metabolic Panel w/ Reflex to MG    CBC with Auto Differential    Hemoglobin A1c    Magnesium    CBC with Auto Differential    Basic Metabolic Panel w/ Reflex to MG    Phosphorus    Hepatitis Panel, Acute    ADULT DIET; Regular; 3 carb choices (45 gm/meal); Low Fat/Low Chol/High Fiber/ANGELA    Cardiac Monitor - ED Only    Vital signs per unit routine    Up with assistance    Notify Provider    HYPOGLYCEMIA TREATMENT: blood glucose LESS THAN 70 mg/dL and patient ALERT and TOLERATING PO    HYPOGLYCEMIA TREATMENT: blood glucose LESS THAN 70 mg/dL and patient NOT ALERT or NPO    Limited Code    Inpatient consult to Orthopedic Surgery    Inpatient consult to Neurology    Inpatient consult to [address]; Breakfast, Lunch, Dinner; Diabetic Oral Supplement    OT eval and treat    PT eval and treat    Initiate Oxygen Therapy Protocol    PLEASE READ & DOCUMENT PPD TEST IN 24 HRS    PLEASE READ & DOCUMENT PPD TEST IN 48 HRS    PLEASE READ & DOCUMENT PPD TEST IN 72 HRS    POCT glucose    POCT Glucose    POCT Glucose    POCT Glucose    POCT Glucose    POCT Glucose    POCT Glucose    EKG 12 Lead    ADMIT TO INPATIENT    ADMIT TO INPATIENT        Tho Mazariegos is a 65 y.o. male who presents to the Emergency Department with chief complaint of    Chief Complaint   Patient presents with    Fall      69-year-old male presents for generalized weakness. Patient states that over past week he has been having worsening generalized weakness. He states that he is no longer able to walk to the weakness. He states that he has had multiple falls over the past week. Patient states that today he just had no strength in his legs and sat down. He had a small skin tear to his left forearm. Recently had kyphoplasty surgery done in the middle of July. Patient states that he really does not seem to be feeling better. For some generalized low back pain. Reports no bowel or bladder incontinence. He denies any saddle anesthesia. Reports that he is able to go to the restroom and has to have his wife help him which has become difficulty.   He is recently started to have wearing diapers which he states he is never had to do before. All other systems reviewed and are negative. Review of Systems   Constitutional:  Positive for fatigue. Negative for chills and fever. HENT:  Negative for congestion, dental problem and drooling. Eyes:  Negative for discharge. Respiratory:  Negative for cough, chest tightness and shortness of breath. Cardiovascular:  Negative for chest pain and palpitations. Gastrointestinal:  Negative for abdominal pain, diarrhea, nausea and vomiting. Endocrine: Negative for polydipsia. Genitourinary:  Negative for difficulty urinating, dysuria and hematuria. Musculoskeletal:  Negative for back pain. Skin:  Negative for rash and wound. Neurological:  Positive for weakness. Negative for dizziness, seizures, speech difficulty, light-headedness and headaches. Psychiatric/Behavioral:  Negative for agitation.       Past Medical History:   Diagnosis Date    Acute respiratory failure with hypoxia ([address] 10/23/2014    MINI (acute kidney injury) ([address] 09/15/2014    MINI (acute kidney injury) ([address]     MINI (acute kidney injury) ([address]     Allergic rhinitis 11/10/2015    Asthma 11/10/2015    Benign essential hypertension 11/10/2015    Benign neoplasm of colon 11/10/2015    CAD (coronary artery disease) 11/10/2015    CAD (coronary artery disease) 1998, 1999    mix2, 3 stents [id number]    CAP (community acquired pneumonia) 12/31/2020    Cardiomyopathy ([address] 65/76/1272    Complicated wound infection 11/10/2015    COPD (chronic obstructive pulmonary disease) with emphysema ([address] 11/10/2015    COPD exacerbation ([address] 10/12/2011    COVID-19 12/31/2020    Diabetes mellitus type 2, controlled ([address] 11/10/2015    Diabetic neuropathy ([address] 11/10/2015    Disruption of wound, unspecified 10/09/2014    Encounter for long-term (current) use of other high-risk medications 11/10/2015    Extremity atherosclerosis with intermittent claudication ([address] 11/10/2015 H/O endarterectomy 11/10/2015    Hiatal hernia 11/10/2015    History of 2019 novel coronavirus disease (COVID-19)     12/31/2020-    Hyperglycemia due to type 1 diabetes mellitus ([address] 11/10/2015    Hyperlipidemia 11/10/2015    ICD (implantable cardioverter-defibrillator) in place 06/16/2022    Monomorphic ventricular tachycardia ([address] 12/31/2020    Myocardial infarction ([address] 1999    Myocardial infarction ([address] 11/10/2015    Obesity 11/10/2015    Orthostatic hypotension 11/10/2015    Osteoarthritis 11/10/2015    Peripheral vascular disease ([address] 11/10/2015    PNA (pneumonia) 02/08/2019    PVC (premature ventricular contraction)     Rash 44/60/3404    Seroma complicating a procedure 10/02/2014    Sleep apnea     cpap    Toe laceration     Type 2 diabetes with nephropathy ([address]     Uncontrolled type 2 diabetes mellitus ([address] 11/10/2015    Vertiginous syndromes and other disorders of vestibular system 11/10/2015        Past Surgical History:   Procedure Laterality Date    CARDIAC CATHETERIZATION  12/05/2018    LV EF=40%. LM:nl. LAD:30-40% mid stenosis. LCX OM1:95% stenosis. RCA:100% occlusion at RV branch.     CORONARY ANGIOPLASTY WITH STENT PLACEMENT  12/05/2018    LCX OM1:2.5 x 12 [place] RAKESH    CORONARY ANGIOPLASTY WITH STENT PLACEMENT  1999    [place] ABDOMEN SURGERY PROC UNLISTED  1960    abdominal cyst removed    [place] CARDIAC SURG PROCEDURE UNLIST  1999    stents x3    SPINE SURGERY N/A 7/19/2022    LUMBAR 2, LUMBAR 4 KYPHOPLASTY AND ANY OTHER INDICATED LEVELS performed by Irish Connolly MD at [facility]    VASCULAR SURGERY  11/5/14    Repair of right common femoral artery     VASCULAR SURGERY  9/11/14    tiffanie femoral endarterectomy        Family History   Problem Relation Age of Onset    Breast Cancer Mother     Hypertension Mother     Other Father         DIVERTICULITIS    Crohn's Disease Sister     Lung Disease Brother         mesothioloma    Diabetes Sister     Heart Attack Father     Heart Disease Father     Stroke Mother         Social History     Socioeconomic History    Marital status:    Tobacco Use    Smoking status: Former     Packs/day: 1.00     Types: Cigarettes     Quit date: 10/2/2011     Years since quitting: 10.8    Smokeless tobacco: Current   Substance and Sexual Activity    Alcohol use: Yes     Alcohol/week: 0.0 standard drinks    Drug use: No   Social History Narrative    Lives with wife        Allergies: Azithromycin and Oxaprozin    Current Discharge Medication List        CONTINUE these medications which have NOT CHANGED    Details   fluticasone-salmeterol (ADVAIR) 250-50 MCG/ACT AEPB diskus inhaler Inhale 1 puff into the lungs in the morning and at bedtime      calcitonin (MIACALCIN) 200 UNIT/ACT nasal spray 1 spray by Nasal route daily  Qty: 1 each, Refills: 1    Associated Diagnoses: Compression fracture of L2 vertebra, sequela      albuterol sulfate  (90 Base) MCG/ACT inhaler USE 2 PUFFS BY INHALATION 4 TIMES DAILY AS NEEDED FOR WHEEZING OR SHORTNESS OF BREATH.      amiodarone (CORDARONE) 200 MG tablet Take 200 mg by mouth 2 times daily      apixaban (ELIQUIS) 5 MG TABS tablet Take 5 mg by mouth every 12 hours      ascorbic acid (VITAMIN C) 500 MG tablet Take 500 mg by mouth      carvedilol (COREG) 25 MG tablet Take 25 mg by mouth 2 times daily (with meals)      Cholecalciferol 50 MCG (2000 UT) TABS Take 2,000 Units by mouth      clopidogrel (PLAVIX) 75 MG tablet Take 75 mg by mouth daily      fluticasone (FLONASE) 50 MCG/ACT nasal spray USE 1 OR 2 SPRAYS IN EACH NOSTRIL EVERY DAY.       furosemide (LASIX) 40 MG tablet Take 20 mg by mouth daily TAKE 1 TABLET BY MOUTH EVERY DAY      glipiZIDE (GLUCOTROL XL) 10 MG extended release tablet TAKE 1 TABLET BY MOUTH TWICE DAILY      glucosamine-chondroitin 750-600 MG TABS tablet Take by mouth 2 times daily      guaiFENesin 1200 MG TB12 Take 1,200 mg by mouth 2 times daily as needed      latanoprost (XALATAN) 0.005 % ophthalmic solution Apply 1 drop to eye      magnesium oxide (MAG-OX) 400 (240 Mg) MG tablet TAKE 1 TABLET BY MOUTH EVERY DAY      metFORMIN (GLUCOPHAGE) 500 MG tablet TAKE TWO TABLETS BY MOUTH 2 TIMES A DAY      montelukast (SINGULAIR) 10 MG tablet TAKE 1 TABLET BY MOUTH EVERY DAY AT BEDTIME      rosuvastatin (CRESTOR) 10 MG tablet TAKE ONE TABLET BY MOUTH ONCE A DAY      SITagliptin (JANUVIA) 100 MG tablet TAKE 1 TABLET BY MOUTH EVERY DAY      valsartan (DIOVAN) 320 MG tablet Take 320 mg by mouth daily      HYDROcodone-acetaminophen (NORCO) 7.5-325 MG per tablet Take 1 tablet by mouth every 6 hours as needed for Pain for up to 7 days. Intended supply: 7 days. Take lowest dose possible to manage pain  Qty: 28 tablet, Refills: 0    Comments: Reduce doses taken as pain becomes manageable  Associated Diagnoses: Postoperative follow-up      tiZANidine (ZANAFLEX) 2 MG tablet Take 1 tablet by mouth every 8 hours as needed (muscle spasms)  Qty: 30 tablet, Refills: 1      nitroGLYCERIN (NITROSTAT) 0.4 MG SL tablet Place 0.4 mg under the tongue      polyethylene glycol (GLYCOLAX) 17 GM/SCOOP powder Take 17 g by mouth daily              Vitals signs and nursing note reviewed. Patient Vitals for the past 4 hrs:   Temp Pulse Resp BP SpO2   07/29/22 0233 98.4 °F (36.9 °C) 63 19 (!) 144/73 93 %          Physical Exam  Vitals and nursing note reviewed. Constitutional:       Appearance: Normal appearance. He is obese. HENT:      Head: Normocephalic and atraumatic. Right Ear: Tympanic membrane normal.      Nose: Nose normal.      Mouth/Throat:      Mouth: Mucous membranes are moist.   Eyes:      Extraocular Movements: Extraocular movements intact. Pupils: Pupils are equal, round, and reactive to light. Cardiovascular:      Rate and Rhythm: Normal rate and regular rhythm. Heart sounds: No murmur heard. Pulmonary:      Effort: Pulmonary effort is normal. No respiratory distress. Breath sounds: Normal breath sounds.    Abdominal: General: There is no distension. Palpations: Abdomen is soft. Tenderness: There is no abdominal tenderness. There is no guarding. Musculoskeletal:         General: No swelling or tenderness. Cervical back: Normal range of motion and neck supple. No rigidity or tenderness. Comments: Range of passive motion of the patient's hip joint with no difficulties. Strength 3/5 in the bilateral lower extremities. Sensation is intact in the bilateral lower extremities    Back he has no midline cervical or thoracic tenderness. Patient reports some, generalized low lumbar pain but no obvious midline tenderness   Skin:     General: Skin is warm and dry. Capillary Refill: Capillary refill takes less than 2 seconds. Neurological:      General: No focal deficit present. Mental Status: He is alert and oriented to person, place, and time. Mental status is at baseline.    Psychiatric:         Behavior: Behavior normal.        Procedures    ED EKG Interpretation  EKG was interpreted in the absence of a cardiologist.    Rate: Rate: Normal  EKG Interpretation: EKG Interpretation: sinus rhythm  ST Segments: Normal ST segments - NO STEMI    Labs Reviewed   CBC WITH AUTO DIFFERENTIAL - Abnormal; Notable for the following components:       Result Value    RBC 3.28 (*)     Hemoglobin 10.6 (*)     Hematocrit 31.6 (*)     RDW 14.8 (*)     Seg Neutrophils 80 (*)     Lymphocytes 8 (*)     Eosinophils % 0 (*)     Segs Absolute 8.6 (*)     All other components within normal limits   COMPREHENSIVE METABOLIC PANEL - Abnormal; Notable for the following components:    Sodium 132 (*)     Chloride 96 (*)     Anion Gap 6 (*)     ALT 94 (*)     AST 92 (*)     Albumin 2.2 (*)     Globulin 4.9 (*)     Albumin/Globulin Ratio 0.4 (*)     All other components within normal limits   URINALYSIS - Abnormal; Notable for the following components:    Protein, UA TRACE (*)     Ketones, Urine TRACE (*)     Leukocyte Esterase, Urine TRACE (*)     WBC, UA 0-4 (*)     RBC, UA 0-5 (*)     Epithelial Cells UA 0-5 (*)     BACTERIA, URINE Negative (*)     Casts 2-5 (*)     All other components within normal limits   BASIC METABOLIC PANEL W/ REFLEX TO MG FOR LOW K - Abnormal; Notable for the following components:    Sodium 134 (*)     Potassium 3.2 (*)     Chloride 97 (*)     All other components within normal limits   CBC WITH AUTO DIFFERENTIAL - Abnormal; Notable for the following components:    RBC 3.21 (*)     Hemoglobin 10.2 (*)     Hematocrit 31.1 (*)     RDW 14.7 (*)     Seg Neutrophils 80 (*)     Lymphocytes 8 (*)     Eosinophils % 0 (*)     Segs Absolute 8.3 (*)     All other components within normal limits   POCT GLUCOSE - Abnormal; Notable for the following components:    POC Glucose 109 (*)     All other components within normal limits   POCT GLUCOSE - Abnormal; Notable for the following components:    POC Glucose 116 (*)     All other components within normal limits   POCT GLUCOSE - Abnormal; Notable for the following components:    POC Glucose 156 (*)     All other components within normal limits   POCT GLUCOSE - Abnormal; Notable for the following components:    POC Glucose 225 (*)     All other components within normal limits   POCT GLUCOSE - Abnormal; Notable for the following components:    POC Glucose 164 (*)     All other components within normal limits   COVID-19, RAPID   CULTURE, URINE   HEMOGLOBIN A1C   MAGNESIUM   CBC WITH AUTO DIFFERENTIAL   BASIC METABOLIC PANEL W/ REFLEX TO MG FOR LOW K   PHOSPHORUS   HEPATITIS PANEL, ACUTE   PLEASE READ & DOCUMENT PPD TEST IN 24 HRS   POCT GLUCOSE   POCT GLUCOSE   POCT GLUCOSE   POCT GLUCOSE   POCT GLUCOSE   POCT GLUCOSE   POCT GLUCOSE   PLEASE READ & DOCUMENT PPD TEST IN 48 HRS        XR CHEST PORTABLE   Final Result   Pulmonary infiltrate predominantly in the right upper lobe and mid   to lower left lung has mostly cleared. Atelectasis or scarring right lung base   appears stable.   Stable cardiomegaly. Implantable cardiac device left chest and   both leads are unchanged. No pneumothorax. No pleural effusions. CT LUMBAR SPINE WO CONTRAST   Final Result   Postoperative changes from kyphoplasty procedure at L2 and L4. No lumbar spine   fracture or malalignment aside from the compression deformities. Multiple levels   of probable spinal canal stenosis as described on recent MRI. MRI LUMBAR SPINE WO CONTRAST    (Results Pending)   MRI BRAIN WO CONTRAST    (Results Pending)   MRI CERVICAL SPINE WO CONTRAST    (Results Pending)                            Voice dictation software was used during the making of this note. This software is not perfect and grammatical and other typographical errors may be present. This note has not been completely proofread for errors.      Cass Brooks, DO  07/29/22 [id number]

## 2022-07-27 NOTE — LETTER
[facility] INTERNAL MEDICINE  [address] [address]  Phone: [phone]  Fax: [phone]    Saddie Cowden, MD        July 29, 2022    [address] [place] [id number]      Dear Genevieve Wiseman:    We have tried to reach out by phone and have been unable to reach you. Could you please call the office and let us know which PT you would like referred to. If you have any questions or concerns, please don't hesitate to call.     Sincerely,        Saddie Cowden, MD

## 2022-07-28 LAB
ANION GAP SERPL CALC-SCNC: 8 MMOL/L (ref 7–16)
BASOPHILS # BLD: 0.1 K/UL (ref 0–0.2)
BASOPHILS NFR BLD: 1 % (ref 0–2)
BUN SERPL-MCNC: 16 MG/DL (ref 8–23)
CALCIUM SERPL-MCNC: 8.6 MG/DL (ref 8.3–10.4)
CHLORIDE SERPL-SCNC: 97 MMOL/L (ref 98–107)
CO2 SERPL-SCNC: 29 MMOL/L (ref 21–32)
CREAT SERPL-MCNC: 1 MG/DL (ref 0.8–1.5)
DIFFERENTIAL METHOD BLD: ABNORMAL
EOSINOPHIL # BLD: 0 K/UL (ref 0–0.8)
EOSINOPHIL NFR BLD: 0 % (ref 0.5–7.8)
ERYTHROCYTE [DISTWIDTH] IN BLOOD BY AUTOMATED COUNT: 14.7 % (ref 11.9–14.6)
EST. AVERAGE GLUCOSE BLD GHB EST-MCNC: 105 MG/DL
GLUCOSE BLD STRIP.AUTO-MCNC: 116 MG/DL (ref 65–100)
GLUCOSE BLD STRIP.AUTO-MCNC: 156 MG/DL (ref 65–100)
GLUCOSE BLD STRIP.AUTO-MCNC: 164 MG/DL (ref 65–100)
GLUCOSE BLD STRIP.AUTO-MCNC: 225 MG/DL (ref 65–100)
GLUCOSE SERPL-MCNC: 82 MG/DL (ref 65–100)
HBA1C MFR BLD: 5.3 % (ref 4.8–5.6)
HCT VFR BLD AUTO: 31.1 % (ref 41.1–50.3)
HGB BLD-MCNC: 10.2 G/DL (ref 13.6–17.2)
IMM GRANULOCYTES # BLD AUTO: 0.2 K/UL (ref 0–0.5)
IMM GRANULOCYTES NFR BLD AUTO: 2 % (ref 0–5)
LYMPHOCYTES # BLD: 0.9 K/UL (ref 0.5–4.6)
LYMPHOCYTES NFR BLD: 8 % (ref 13–44)
MAGNESIUM SERPL-MCNC: 2.2 MG/DL (ref 1.8–2.4)
MCH RBC QN AUTO: 31.8 PG (ref 26.1–32.9)
MCHC RBC AUTO-ENTMCNC: 32.8 G/DL (ref 31.4–35)
MCV RBC AUTO: 96.9 FL (ref 79.6–97.8)
MM INDURATION, POC: 0 MM (ref 0–5)
MONOCYTES # BLD: 0.9 K/UL (ref 0.1–1.3)
MONOCYTES NFR BLD: 9 % (ref 4–12)
NEUTS SEG # BLD: 8.3 K/UL (ref 1.7–8.2)
NEUTS SEG NFR BLD: 80 % (ref 43–78)
NRBC # BLD: 0 K/UL (ref 0–0.2)
PLATELET # BLD AUTO: 310 K/UL (ref 150–450)
PMV BLD AUTO: 10.4 FL (ref 9.4–12.3)
POTASSIUM SERPL-SCNC: 3.2 MMOL/L (ref 3.5–5.1)
PPD, POC: NEGATIVE
RBC # BLD AUTO: 3.21 M/UL (ref 4.23–5.6)
SERVICE CMNT-IMP: ABNORMAL
SODIUM SERPL-SCNC: 134 MMOL/L (ref 138–145)
WBC # BLD AUTO: 10.3 K/UL (ref 4.3–11.1)

## 2022-07-28 PROCEDURE — 97530 THERAPEUTIC ACTIVITIES: CPT

## 2022-07-28 PROCEDURE — 97166 OT EVAL MOD COMPLEX 45 MIN: CPT

## 2022-07-28 PROCEDURE — 6360000002 HC RX W HCPCS: Performed by: INTERNAL MEDICINE

## 2022-07-28 PROCEDURE — 97162 PT EVAL MOD COMPLEX 30 MIN: CPT

## 2022-07-28 PROCEDURE — 82962 GLUCOSE BLOOD TEST: CPT

## 2022-07-28 PROCEDURE — 6370000000 HC RX 637 (ALT 250 FOR IP): Performed by: INTERNAL MEDICINE

## 2022-07-28 PROCEDURE — 94760 N-INVAS EAR/PLS OXIMETRY 1: CPT

## 2022-07-28 PROCEDURE — APPNB30 APP NON BILLABLE TIME 0-30 MINS: Performed by: NURSE PRACTITIONER

## 2022-07-28 PROCEDURE — 1100000000 HC RM PRIVATE

## 2022-07-28 PROCEDURE — 97535 SELF CARE MNGMENT TRAINING: CPT

## 2022-07-28 PROCEDURE — 94660 CPAP INITIATION&MGMT: CPT

## 2022-07-28 PROCEDURE — 94640 AIRWAY INHALATION TREATMENT: CPT

## 2022-07-28 PROCEDURE — 2580000003 HC RX 258: Performed by: INTERNAL MEDICINE

## 2022-07-28 RX ADMIN — SODIUM CHLORIDE, PRESERVATIVE FREE 10 ML: 5 INJECTION INTRAVENOUS at 21:56

## 2022-07-28 RX ADMIN — CEFTRIAXONE 1000 MG: 1 INJECTION, POWDER, FOR SOLUTION INTRAMUSCULAR; INTRAVENOUS at 21:56

## 2022-07-28 RX ADMIN — MONTELUKAST 10 MG: 10 TABLET, FILM COATED ORAL at 21:55

## 2022-07-28 RX ADMIN — LATANOPROST 1 DROP: 50 SOLUTION OPHTHALMIC at 11:00

## 2022-07-28 RX ADMIN — CARVEDILOL 25 MG: 25 TABLET, FILM COATED ORAL at 09:13

## 2022-07-28 RX ADMIN — MOMETASONE FUROATE AND FORMOTEROL FUMARATE DIHYDRATE 2 PUFF: 200; 5 AEROSOL RESPIRATORY (INHALATION) at 20:24

## 2022-07-28 RX ADMIN — SODIUM CHLORIDE, PRESERVATIVE FREE 5 ML: 5 INJECTION INTRAVENOUS at 09:15

## 2022-07-28 RX ADMIN — CARVEDILOL 25 MG: 25 TABLET, FILM COATED ORAL at 17:49

## 2022-07-28 RX ADMIN — APIXABAN 5 MG: 5 TABLET, FILM COATED ORAL at 21:55

## 2022-07-28 RX ADMIN — INSULIN LISPRO 1 UNITS: 100 INJECTION, SOLUTION INTRAVENOUS; SUBCUTANEOUS at 17:51

## 2022-07-28 RX ADMIN — AMIODARONE HYDROCHLORIDE 200 MG: 200 TABLET ORAL at 21:55

## 2022-07-28 RX ADMIN — FUROSEMIDE 20 MG: 20 TABLET ORAL at 09:13

## 2022-07-28 RX ADMIN — APIXABAN 5 MG: 5 TABLET, FILM COATED ORAL at 09:13

## 2022-07-28 RX ADMIN — CLOPIDOGREL BISULFATE 75 MG: 75 TABLET ORAL at 09:12

## 2022-07-28 RX ADMIN — AMIODARONE HYDROCHLORIDE 200 MG: 200 TABLET ORAL at 09:12

## 2022-07-28 RX ADMIN — ROSUVASTATIN CALCIUM 10 MG: 10 TABLET, FILM COATED ORAL at 09:13

## 2022-07-28 RX ADMIN — FLUTICASONE PROPIONATE 2 SPRAY: 50 SPRAY, METERED NASAL at 09:34

## 2022-07-28 RX ADMIN — VALSARTAN 320 MG: 320 TABLET, FILM COATED ORAL at 09:12

## 2022-07-28 RX ADMIN — CALCITONIN SALMON 1 SPRAY: 200 SPRAY, METERED NASAL at 11:00

## 2022-07-28 ASSESSMENT — PAIN DESCRIPTION - LOCATION: LOCATION: BACK

## 2022-07-28 ASSESSMENT — PAIN SCALES - GENERAL
PAINLEVEL_OUTOF10: 7
PAINLEVEL_OUTOF10: 8
PAINLEVEL_OUTOF10: 3

## 2022-07-28 ASSESSMENT — PAIN DESCRIPTION - ORIENTATION: ORIENTATION: LOWER

## 2022-07-28 NOTE — PROGRESS NOTES
Evaluation:      Food/Nutrient Intake Outcomes: Diet Advancement/Tolerance, Food and Nutrient Intake, Supplement Intake  Physical Signs/Symptoms Outcomes: Meal Time Behavior, Weight    Discharge Planning:    Continue Oral Nutrition Supplement    MAGUE BLAIR, RD

## 2022-07-28 NOTE — H&P
Hospitalist History and Physical   Admit Date:  2022  3:41 PM   Name:  Edgar Rebolledo   Age:  65 y.o. Sex:  male  :  1945   MRN:  [id number]   Room:  [place] [address]    Presenting Complaint: Fall     Reason(s) for Admission: Back pain [M54.9]     History of Present Illness:     Edgar Rebolledo is a 65 y.o. male with medical history of ICM with ICD/PPM, EF 30-35% afib on eliquis, DM2, obesity, PAD, anemia, COPD/ asthma, CAD, SHERI on CPAP with recent L2-4 kyphoplasty evaluated with diffuse  bilateral LE weakness. He states this has been worse since his kyphoplasty and he has had several falls. Lives with wife Man Bowser. Seen at ortho spine today and ordered MRI Lspine to evaluate ongoing pain and weakness. Did have negative bone biopsy with kyphoplasty. Felt worse and not better post procedure. Unable to ambulate well, using walker, has trouble getting up from seated position. No LE paresthesia and no urine issues excluding some issues starting stream. Had some loose BM and can't make it to the bathroom in time. No fever. No dysuria. Urine is darker. Has ICD/PPM- will need clearance for MRI. CT Lspine shows \"  Postoperative changes from kyphoplasty procedure at L2 and L4. No lumbar spine   fracture or malalignment aside from the compression deformities. Multiple levels   of probable spinal canal stenosis as described on recent MRI. \"    UA positive. CXR with improved infiltrates. COVID negative. Wife Man Bowser [phone], or [phone]    He is a DNI           Review of Systems:  10 systems reviewed and negative except as noted in HPI. - has chronic vision change, has poor appetite, has lost weight, no edema, no chest pain, no dyspnea, no confusion   Assessment & Plan:     Principal Problem:    Back pain  Plan:   Weakness  Admit to medical bed   MRI Lspine  Ortho consult   Neurology consult   PPD, PT,OT         Acute cystitis without hematuria  Plan:   D 1 rocephin  Followup culture DNI (do not intubate)  Plan:   Noted and discussed             Elevated liver enzymes  Plan:   Trend lab          Anemia  Plan:   HGB 10.6  Stable, chronic range for him  Trend HGB           Hyponatremia  Plan:     Trend lab             Ischemic cardiomyopathy  Plan:     Ventricular tachycardia ([facility]  Plan:     Coronary artery disease involving native coronary artery of native heart without angina pectoris  Plan:     PAD (peripheral artery disease) ([facility]  Plan: Active Problems:    ICD (implantable cardioverter-defibrillator) in place  Plan:     Atrial fibrillation ([facility]  Plan:   Continued eliquis, amiodarone, statin, plavix             Severe obesity (BMI 35.0-39. 9) with comorbidity ([facility]  Plan:   Needs modification           COPD (chronic obstructive pulmonary disease) ([facility])  Plan:   Resume inhalers               DM (diabetes mellitus) type II, controlled, with peripheral vascular disorder ([facility]  Plan:   SSI           Obstructive sleep apnea  Plan:   Resume CPAP      Dispo/Discharge Planning:     Pending     Diet: diabetic  VTE ppx: eliquis  Code status: DNI     Hospital Problems:  Principal Problem:    Back pain  Active Problems:    ICD (implantable cardioverter-defibrillator) in place    General weakness    Acute cystitis without hematuria    Low back pain without sciatica    DNI (do not intubate)    UTI (urinary tract infection)    Elevated liver enzymes    Anemia    Hyponatremia    Atrial fibrillation (HCC)    Severe obesity (BMI 35.0-39. 9) with comorbidity ([facility]    COPD (chronic obstructive pulmonary disease) (HCC)    Intermittent spinal claudication (HCC)    Ischemic cardiomyopathy    Ventricular tachycardia (HCC)    Coronary artery disease involving native coronary artery of native heart without angina pectoris    PAD (peripheral artery disease) ([facility])    Asthma    DM (diabetes mellitus) type II, controlled, with peripheral vascular disorder ([facility]    Obstructive sleep apnea  Resolved Problems: * No resolved hospital problems.  *       Past History:     Past Medical History:   Diagnosis Date    Acute respiratory failure with hypoxia ([address] 10/23/2014    MINI (acute kidney injury) ([address] 09/15/2014    MINI (acute kidney injury) ([address]     MINI (acute kidney injury) ([address]     Allergic rhinitis 11/10/2015    Asthma 11/10/2015    Benign essential hypertension 11/10/2015    Benign neoplasm of colon 11/10/2015    CAD (coronary artery disease) 11/10/2015    CAD (coronary artery disease) 1998, 1999    mix2, 3 stents [id number]    CAP (community acquired pneumonia) 12/31/2020    Cardiomyopathy ([address] 34/11/9169    Complicated wound infection 11/10/2015    COPD (chronic obstructive pulmonary disease) with emphysema ([address] 11/10/2015    COPD exacerbation ([address] 10/12/2011    COVID-19 12/31/2020    Diabetes mellitus type 2, controlled ([address] 11/10/2015    Diabetic neuropathy ([address] 11/10/2015    Disruption of wound, unspecified 10/09/2014    Encounter for long-term (current) use of other high-risk medications 11/10/2015    Extremity atherosclerosis with intermittent claudication ([address] 11/10/2015    H/O endarterectomy 11/10/2015    Hiatal hernia 11/10/2015    History of 2019 novel coronavirus disease (COVID-19)     12/31/2020-    Hyperglycemia due to type 1 diabetes mellitus ([address] 11/10/2015    Hyperlipidemia 11/10/2015    ICD (implantable cardioverter-defibrillator) in place 06/16/2022    Monomorphic ventricular tachycardia ([address] 12/31/2020    Myocardial infarction ([address] 1999    Myocardial infarction ([address] 11/10/2015    Obesity 11/10/2015    Orthostatic hypotension 11/10/2015    Osteoarthritis 11/10/2015    Peripheral vascular disease ([address] 11/10/2015    PNA (pneumonia) 02/08/2019    PVC (premature ventricular contraction)     Rash 40/96/0371    Seroma complicating a procedure 10/02/2014    Sleep apnea     cpap    Toe laceration     Type 2 diabetes with nephropathy ([facility]     Uncontrolled type 2 diabetes mellitus ([facility] 11/10/2015 Vertiginous syndromes and other disorders of vestibular system 11/10/2015     Past Surgical History:   Procedure Laterality Date    CARDIAC CATHETERIZATION  12/05/2018    LV EF=40%. LM:nl. LAD:30-40% mid stenosis. LCX OM1:95% stenosis. RCA:100% occlusion at RV branch. CORONARY ANGIOPLASTY WITH STENT PLACEMENT  12/05/2018    LCX OM1:2.5 x 12 Giuseppe RAKESH    CORONARY ANGIOPLASTY WITH STENT PLACEMENT  1999    TX ABDOMEN SURGERY PROC UNLISTED  1960    abdominal cyst removed    TX CARDIAC SURG PROCEDURE UNLIST  1999    stents x3    SPINE SURGERY N/A 7/19/2022    LUMBAR 2, LUMBAR 4 KYPHOPLASTY AND ANY OTHER INDICATED LEVELS performed by Thuan Delaney MD at [facility] MAIN OR    VASCULAR SURGERY  11/5/14    Repair of right common femoral artery     VASCULAR SURGERY  9/11/14    tiffanie femoral endarterectomy      Social History     Tobacco Use    Smoking status: Former     Packs/day: 1.00     Types: Cigarettes     Quit date: 10/2/2011     Years since quitting: 10.8    Smokeless tobacco: Current   Substance Use Topics    Alcohol use: Yes     Alcohol/week: 0.0 standard drinks      Social History     Substance and Sexual Activity   Drug Use No     Family History   Problem Relation Age of Onset    Breast Cancer Mother     Hypertension Mother     Other Father         DIVERTICULITIS    Crohn's Disease Sister     Lung Disease Brother         mesothioloma    Diabetes Sister     Heart Attack Father     Heart Disease Father     Stroke Mother       Family history reviewed and negative except as noted above.       Immunization History   Administered Date(s) Administered    COVID-19, MODERNA BLUE border, Primary or Immunocompromised, (age 12y+), IM, 100 mcg/0.5mL 03/15/2021, 04/15/2021    Influenza Trivalent 09/26/2014, 10/12/2015    Influenza Virus Vaccine 09/30/2010, 10/17/2011, 09/01/2013, 11/02/2014    Influenza, High Dose (Fluzone 65 yrs and older) 11/17/2016, 08/11/2017, 09/13/2018, 10/07/2019, 09/30/2020    Influenza, Quadv, adjuvanted, 65 yrs +, IM, PF (Fluad) 11/02/2021    PPD Test 01/06/2021    Pneumococcal Conjugate 13-valent ([id number]) 04/10/2015    Pneumococcal Polysaccharide ([id number]) 11/27/2007, 10/17/2011    Pneumococcal Vaccine 10/17/2011    Zoster Recombinant (Shingrix) 09/06/2018, 11/27/2018     Allergies   Allergen Reactions    Azithromycin Hives    Oxaprozin Other (See Comments)     ELEVATED BLOOD PRESSURE     Prior to Admit Medications:  Current Outpatient Medications   Medication Instructions    albuterol sulfate  (90 Base) MCG/ACT inhaler USE 2 PUFFS BY INHALATION 4 TIMES DAILY AS NEEDED FOR WHEEZING OR SHORTNESS OF BREATH.    amiodarone (CORDARONE) 200 mg, Oral, 2 TIMES DAILY    apixaban (ELIQUIS) 5 mg, Oral, EVERY 12 HOURS    ascorbic acid (VITAMIN C) 500 mg, Oral    calcitonin (MIACALCIN) 200 UNIT/ACT nasal spray 1 spray, Nasal, DAILY    carvedilol (COREG) 25 mg, Oral, 2 TIMES DAILY WITH MEALS    Cholecalciferol 2,000 Units, Oral    clopidogrel (PLAVIX) 75 mg, Oral, DAILY    fluticasone (FLONASE) 50 MCG/ACT nasal spray USE 1 OR 2 SPRAYS IN EACH NOSTRIL EVERY DAY. fluticasone-salmeterol (ADVAIR) 250-50 MCG/ACT AEPB diskus inhaler 1 puff, Inhalation, 2 times daily    furosemide (LASIX) 20 mg, Oral, DAILY, TAKE 1 TABLET BY MOUTH EVERY DAY    glipiZIDE (GLUCOTROL XL) 10 MG extended release tablet TAKE 1 TABLET BY MOUTH TWICE DAILY    glucosamine-chondroitin 750-600 MG TABS tablet Oral, 2 TIMES DAILY    guaiFENesin 1,200 mg, Oral, 2 TIMES DAILY PRN    HYDROcodone-acetaminophen (NORCO) 7.5-325 MG per tablet 1 tablet, Oral, EVERY 6 HOURS PRN, Intended supply: 7 days.  Take lowest dose possible to manage pain    latanoprost (XALATAN) 0.005 % ophthalmic solution 1 drop, Ophthalmic    magnesium oxide (MAG-OX) 400 (240 Mg) MG tablet TAKE 1 TABLET BY MOUTH EVERY DAY    metFORMIN (GLUCOPHAGE) 500 MG tablet TAKE TWO TABLETS BY MOUTH 2 TIMES A DAY    montelukast (SINGULAIR) 10 MG tablet TAKE 1 TABLET BY MOUTH EVERY DAY AT BEDTIME nitroGLYCERIN (NITROSTAT) 0.4 mg, SubLINGual    polyethylene glycol (GLYCOLAX) 17 g, Oral, DAILY    rosuvastatin (CRESTOR) 10 MG tablet TAKE ONE TABLET BY MOUTH ONCE A DAY    SITagliptin (JANUVIA) 100 MG tablet TAKE 1 TABLET BY MOUTH EVERY DAY    tiZANidine (ZANAFLEX) 2 mg, Oral, EVERY 8 HOURS PRN    valsartan (DIOVAN) 320 mg, Oral, DAILY         Objective:   Patient Vitals for the past 24 hrs:   Temp Pulse Resp BP SpO2   07/27/22 1921 -- 64 21 (!) 126/51 --   07/27/22 1801 -- -- -- 136/68 91 %   07/27/22 1741 -- -- -- 123/83 92 %   07/27/22 1731 -- -- -- 130/66 94 %   07/27/22 1711 -- -- -- 123/64 95 %   07/27/22 1701 -- -- -- 137/71 (!) 84 %   07/27/22 1641 -- -- -- 128/61 91 %   07/27/22 1631 -- -- -- 125/63 92 %   07/27/22 1601 -- -- -- 127/63 90 %   07/27/22 1551 -- -- -- 120/66 93 %   07/27/22 1545 98.9 °F (37.2 °C) 65 16 120/66 94 %       Oxygen Therapy  SpO2: 91 %  O2 Device: None (Room air)    Estimated body mass index is 34.3 kg/m² as calculated from the following:    Height as of this encounter: 6' 1\" (1.854 m). Weight as of this encounter: 260 lb (117.9 kg). Intake/Output Summary (Last 24 hours) at 7/27/2022 2226  Last data filed at 7/27/2022 2144  Gross per 24 hour   Intake 1000 ml   Output --   Net 1000 ml         Physical Exam:    Blood pressure (!) 126/51, pulse 64, temperature 98.9 °F (37.2 °C), temperature source Oral, resp. rate 21, height 6' 1\" (1.854 m), weight 260 lb (117.9 kg), SpO2 91 %. General:    Well nourished. Alert, hard of hearing, elderly  Head:  Normocephalic, atraumatic  Eyes:  Sclerae appear normal.  Pupils equally round. ENT:  Nares appear normal, no drainage. Moist oral mucosa  Neck:  No restricted ROM. Trachea midline   CV:   RRR. III/VI ANDREA LUSB , no edema   Lungs:   CTAB. No wheezing, rhonchi, or rales. Symmetric expansion. anterior   Abdomen: Bowel sounds present. Soft, nontender, nondistended. obese  Extremities: No cyanosis or clubbing.   No edema  Skin: No rashes and normal coloration. Warm and dry. Neuro:  Diffuse LE weakness 0/5, 5/5 upper extremity strength   Psych:  Normal mood and affect.       I have reviewed ordered lab tests and independently visualized imaging below:    Last 24hr Labs:  Recent Results (from the past 24 hour(s))   CBC with Auto Differential    Collection Time: 07/27/22  7:17 PM   Result Value Ref Range    WBC 10.8 4.3 - 11.1 K/uL    RBC 3.28 (L) 4.23 - 5.6 M/uL    Hemoglobin 10.6 (L) 13.6 - 17.2 g/dL    Hematocrit 31.6 (L) 41.1 - 50.3 %    MCV 96.3 79.6 - 97.8 FL    MCH 32.3 26.1 - 32.9 PG    MCHC 33.5 31.4 - 35.0 g/dL    RDW 14.8 (H) 11.9 - 14.6 %    Platelets 350 234 - 716 K/uL    MPV 10.9 9.4 - 12.3 FL    nRBC 0.00 0.0 - 0.2 K/uL    Differential Type AUTOMATED      Seg Neutrophils 80 (H) 43 - 78 %    Lymphocytes 8 (L) 13 - 44 %    Monocytes 10 4.0 - 12.0 %    Eosinophils % 0 (L) 0.5 - 7.8 %    Basophils 1 0.0 - 2.0 %    Immature Granulocytes 2 0.0 - 5.0 %    Segs Absolute 8.6 (H) 1.7 - 8.2 K/UL    Absolute Lymph # 0.9 0.5 - 4.6 K/UL    Absolute Mono # 1.1 0.1 - 1.3 K/UL    Absolute Eos # 0.0 0.0 - 0.8 K/UL    Basophils Absolute 0.1 0.0 - 0.2 K/UL    Absolute Immature Granulocyte 0.2 0.0 - 0.5 K/UL   CMP    Collection Time: 07/27/22  7:17 PM   Result Value Ref Range    Sodium 132 (L) 138 - 145 mmol/L    Potassium 4.4 3.5 - 5.1 mmol/L    Chloride 96 (L) 98 - 107 mmol/L    CO2 30 21 - 32 mmol/L    Anion Gap 6 (L) 7 - 16 mmol/L    Glucose 90 65 - 100 mg/dL    BUN 17 8 - 23 MG/DL    Creatinine 0.90 0.8 - 1.5 MG/DL    GFR African American >60 >60 ml/min/1.73m2    GFR Non- >60 >60 ml/min/1.73m2    Calcium 8.5 8.3 - 10.4 MG/DL    Total Bilirubin 1.1 0.2 - 1.1 MG/DL    ALT 94 (H) 12 - 65 U/L    AST 92 (H) 15 - 37 U/L    Alk Phosphatase 83 50 - 136 U/L    Total Protein 7.1 6.3 - 8.2 g/dL    Albumin 2.2 (L) 3.2 - 4.6 g/dL    Globulin 4.9 (H) 2.3 - 3.5 g/dL    Albumin/Globulin Ratio 0.4 (L) 1.2 - 3.5     Urinalysis    Collection Time: 07/27/22  7:17 PM   Result Value Ref Range    Color, UA DARK YELLOW      Appearance CLEAR      Specific Gravity, UA 1.022 1.001 - 1.023      pH, Urine 5.5 5.0 - 9.0      Protein, UA TRACE (A) NEG mg/dL    Glucose, UA Negative mg/dL    Ketones, Urine TRACE (A) NEG mg/dL    Bilirubin Urine Negative NEG      Blood, Urine Negative NEG      Urobilinogen, Urine 1.0 0.2 - 1.0 EU/dL    Nitrite, Urine Negative NEG      Leukocyte Esterase, Urine TRACE (A) NEG      WBC, UA 0-4 (A) NORM /hpf    RBC, UA 0-5 (A) NORM /hpf    Epithelial Cells UA 0-5 (A) NORM /hpf    BACTERIA, URINE Negative (A) NORM /hpf    Casts 2-5 (A) NORM /lpf   COVID-19, Rapid    Collection Time: 07/27/22  7:17 PM    Specimen: Nasopharyngeal   Result Value Ref Range    Source NASAL      SARS-CoV-2, Rapid Not detected NOTD         Other Studies:  XR LUMBAR SPINE (2-3 VIEWS)    Result Date: 7/27/2022  AUTOMATIC ADMINISTRATIVE RESULT The result for this exam can be found in the Progress note in the chart. See Progress note in the chart. CT LUMBAR SPINE WO CONTRAST    Result Date: 7/27/2022  EXAMINATION: CT LUMBAR SPINE WO CONTRAST 7/27/2022 6:21 PM ACCESSION NUMBER: [id number] COMPARISON: MRI lumbar spine 06/02/2022. INDICATION: Recent kyphoplasty with fall. TECHNIQUE: Multiple-row detector helical CT examination of the lumbar spine was performed without intravenous contrast. Multiplanar reformats were provided. Radiation dose reduction techniques were used for this study. Our CT scanners use one or all of the following: Automated exposure control, adjustment of the mA and/or kV according to patient size, iterative reconstruction. FINDINGS: Patient is status post interval kyphoplasty procedure at L2 and L4 when compared to prior MRI. Mild compression fractures are noted at these levels. No retropulsed fragments noted in the spinal canal. Multilevel degenerative disc changes are present. No malalignment.  Areas of spinal canal stenosis suspected as

## 2022-07-28 NOTE — ED NOTES
TRANSFER - OUT REPORT:    Verbal report given to Garima on Ronan Perez  being transferred to  for routine progression of patient care       Report consisted of patient's Situation, Background, Assessment and   Recommendations(SBAR). Information from the following report(s) ED Encounter Summary was reviewed with the receiving nurse. Lines:   Peripheral IV 07/27/22 Right Forearm (Active)   Site Assessment Clean, dry & intact 07/27/22 1953   Line Status Blood return noted; Flushed;Normal saline locked 07/27/22 [address] pulled back 07/27/22 1953   Phlebitis Assessment No symptoms 07/27/22 1953   Infiltration Assessment 0 07/27/22 1953   Alcohol Cap Used No 07/27/22 1953   Dressing Status New dressing applied;Clean, dry & intact 07/27/22 1953   Dressing Type Transparent 07/27/22 1953        Opportunity for questions and clarification was provided.       Patient transported with:  KIMBERLEE Barba  07/27/22 1846

## 2022-07-28 NOTE — CARE COORDINATION
Chart screened by CM for d/c planning. 64 y/o male pt who resides with his spouse Danyelle Smith [phone]. Pt has insurance with pharmacy benefits and a PCP. PT/OT consults have been ordered. CM is awaiting recommendations. Pt admitted with Dx of Back pain, Weakness, Acute cystitis without hematuria, DM-II, Obesity, PAD, Anemia, Elevated liver enzymes, Hyponatremia, COPD, Asthma, CAD, SHERI on CPAP, Ischemic cardiomyopathy, Ventricular tachycardia, A-Fib, ICD in place, Recent L2-4 kyphoplasty, and Diffuse BLE weakness. Anticipated d/c plan is for pt to return home when medically stable. CM will continue to follow and remain available if any needs arise. 07/27/22 8503   Service Assessment   Patient Orientation Alert and Oriented;Person;Place; Self   Cognition Alert   History Provided By Medical Record   Primary Caregiver Self   Support Systems Spouse/Significant Other;Children   Patient's Healthcare Decision Maker is: Legal Next of Kin   PCP Verified by CM Yes  Neva Hooper MD)   Last Visit to PCP Within last 3 months   Prior Functional Level Independent in ADLs/IADLs   Current Functional Level Independent in ADLs/IADLs   Can patient return to prior living arrangement Yes   Ability to make needs known: Good   Family able to assist with home care needs: Yes   Would you like for me to discuss the discharge plan with any other family members/significant others, and if so, who? No   Financial Resources Other (Comment); Medicare  ([employer])   Social/Functional History   Lives With Spouse   Type of [facility] Two level   Bathroom Toilet Standard   Bathroom Accessibility Accessible   Receives Help From Family   ADL Assistance Independent   Homemaking Assistance Independent   Ambulation Assistance Independent   Transfer Assistance Independent   Active  Yes   Mode of Transportation Car   Occupation Retired   Discharge Planning   Type of [address] Arrangements Spouse/Significant Other   Current Services Prior To Admission C-pap   Potential Assistance Needed N/A   Potential Assistance Purchasing Medications No   Type of Home Care Services None   Patient expects to be discharged to: House   One/Two Story Residence Two story, live on first floor   History of falls? [facility] Discharge   Transition of Care Consult (CM Consult) Discharge [address] [facility] Provided? No   Mode of Transport at Discharge Other (see comment)  (Family)   Confirm Follow Up Transport Family   Condition of Participation: Discharge Planning   The Plan for Transition of Care is related to the following treatment goals: Pt to obtain care to become medically stable and to return with a safe transition. The Patient and/or Patient Representative was provided with a Choice of Provider? Patient   Freedom of Choice list was provided with basic dialogue that supports the patient's individualized plan of care/goals, treatment preferences, and shares the quality data associated with the providers?   Yes

## 2022-07-28 NOTE — PROGRESS NOTES
MRI will be done tomorrow around 1230pm with [employer] rep.  Nurse will need to accompany to monitor during MRI exam.

## 2022-07-28 NOTE — PROGRESS NOTES
Hospitalist Progress Note   Admit Date:  2022  3:41 PM   Name:  Edgar Rebolledo   Age:  65 y.o. Sex:  male  :  1945   MRN:  [id number]   Room:  [facility]/      Reason(s) for Admission: Back pain [M54.9]  General weakness [R53.1]  Acute cystitis without hematuria [N30.00]  Low back pain without sciatica, unspecified back pain laterality, unspecified chronicity [M54.50]     Hospital Course & Interval History:   Edgar Rebolledo is a 65 y.o. male with medical history of ICM with ICD/PPM, EF 30-35% afib on eliquis, DM2, obesity, PAD, anemia, COPD/ asthma, CAD, SHERI on CPAP with recent L2-4 kyphoplasty evaluated with diffuse  bilateral LE weakness. He states this has been worse since his kyphoplasty and he has had several falls. Lives with wife Man Bowser. Seen at ortho spine today and ordered MRI Lspine to evaluate ongoing pain and weakness. Did have negative bone biopsy with kyphoplasty. Felt worse and not better post procedure. Unable to ambulate well, using walker, has trouble getting up from seated position. No LE paresthesia and no urine issues excluding some issues starting stream. Had some loose BM and can't make it to the bathroom in time. No fever. No dysuria. Urine is darker. Has ICD/PPM- will need clearance for MRI. CT Lspine shows \"  Postoperative changes from kyphoplasty procedure at L2 and L4. No lumbar spine   fracture or malalignment aside from the compression deformities. Multiple levels   of probable spinal canal stenosis as described on recent MRI. \"     UA positive. CXR with improved infiltrates. COVID negative. Wife Mna Bowser [phone], or [phone]     He is a DNI       Subjective/24hr Events (22): Patient is seen and examined at the bedside. He is sitting on a chair comfortably  and saturating well on room air. He is hard of hearing. He is complaining of back pain. Spoke with the spouse at the bedside.       Assessment & Plan:      Back pain  Weakness  Admit to medical bed  MRI brain, Lumbar and cervical spine which will be done on 7/29 around 1230pm with [employer] rep. Nurse will need to accompany to monitor during MRI exam.   F/u CRP  Ortho consulted  Neurology following  PPD, PT,OT           Acute cystitis without hematuria  Plan:   D 2 rocephin  Follow up urine culture          DNI (do not intubate)  Plan:   Noted and discussed         Elevated liver enzymes  Plan:   Trend lab             Anemia  Plan:   HGB 10.2, stable   Stable, chronic range for him  Trend HGB              Hyponatremia  Plan:    improving      Ischemic cardiomyopathy  Plan:    Ventricular tachycardia ([facility] [address]  Plan:    Coronary artery disease involving native coronary artery of native heart without angina pectoris  Plan:    PAD (peripheral artery disease) ([facility] [address]  Plan: Active Problems:    ICD (implantable cardioverter-defibrillator) in place  Plan:     Atrial fibrillation ([facility] [address]  Plan:   Continued eliquis, amiodarone, statin, plavix         Severe obesity (BMI 35.0-39. 9) with comorbidity ([facility] [address]  Plan:   Needs modification          COPD (chronic obstructive pulmonary disease) ([facility])  Plan:   Resume inhalers                 DM (diabetes mellitus) type II, controlled, with peripheral vascular disorder ([facility] [address]  Plan:   SSI              Obstructive sleep apnea  Plan:   Resume CPAP       Dispo/Discharge Planning: Anticipating hospital stay for 2 to 3 days. Diet: diabetic  VTE ppx: eliquis  Code status: DNI     Diet:  ADULT DIET; Regular; 3 carb choices (45 gm/meal);  Low Fat/Low Chol/High Fiber/ANGELA  ADULT ORAL NUTRITION SUPPLEMENT; Breakfast, Lunch, Dinner; Diabetic Oral Supplement  DVT PPx  Code status: Limited    Hospital Problems             Last Modified POA    * (Principal) Back pain 7/27/2022 Yes    ICD (implantable cardioverter-defibrillator) in place 7/27/2022 Yes    General weakness 7/27/2022 Yes    Acute cystitis without hematuria 7/27/2022 Yes    Low back pain without sciatica 7/27/2022 Yes DNI (do not intubate) (Chronic) 7/27/2022 Yes    UTI (urinary tract infection) 7/27/2022 Yes    Elevated liver enzymes 7/27/2022 Yes    Anemia (Chronic) 7/27/2022 Yes    Hyponatremia 7/27/2022 Yes    Atrial fibrillation ([facility] [address] 7/27/2022 Yes    Severe obesity (BMI 35.0-39. 9) with comorbidity ([facility] [address] 7/27/2022 Yes    COPD (chronic obstructive pulmonary disease) ([facility] [address] 7/27/2022 Yes    Intermittent spinal claudication ([facility] [address] 7/27/2022 Yes    Ischemic cardiomyopathy 7/27/2022 Yes    Ventricular tachycardia ([address] 7/27/2022 Yes    Coronary artery disease involving native coronary artery of native heart without angina pectoris 7/27/2022 Yes    PAD (peripheral artery disease) ([facility] [address] 7/27/2022 Yes    Asthma 7/27/2022 Yes    DM (diabetes mellitus) type II, controlled, with peripheral vascular disorder ([facility] [address] 7/27/2022 Yes    Obstructive sleep apnea 7/27/2022 Yes       Objective:   Patient Vitals for the past 24 hrs:   Temp Pulse Resp BP SpO2   07/28/22 1505 98.8 °F (37.1 °C) 62 20 137/61 94 %   07/28/22 1110 98.4 °F (36.9 °C) 61 20 130/63 92 %   07/28/22 0736 99.1 °F (37.3 °C) 80 18 (!) 158/77 92 %   07/28/22 0306 -- -- -- -- 95 %   07/28/22 0300 98.2 °F (36.8 °C) 81 19 (!) 149/73 91 %   07/28/22 0040 -- -- -- -- 95 %   07/28/22 0015 98.1 °F (36.7 °C) 68 20 (!) 158/68 96 %   07/27/22 2227 -- 74 23 (!) 147/68 --   07/27/22 2217 -- 66 19 138/60 --   07/27/22 2157 -- 70 22 134/62 --   07/27/22 2147 -- 66 17 (!) 127/59 --   07/27/22 1921 -- 64 21 (!) 126/51 --   07/27/22 1801 -- -- -- 136/68 91 %   07/27/22 1741 -- -- -- 123/83 92 %   07/27/22 1731 -- -- -- 130/66 94 %   07/27/22 1711 -- -- -- 123/64 95 %   07/27/22 1701 -- -- -- 137/71 (!) 84 %   07/27/22 1641 -- -- -- 128/61 91 %   07/27/22 1631 -- -- -- 125/63 92 %       Estimated body mass index is 34.3 kg/m² as calculated from the following:    Height as of this encounter: 6' 1\" (1.854 m). Weight as of 7/28/22: 260 lb (117.9 kg).     Intake/Output Summary (Last 24 hours) at 7/28/2022 1607  Last data filed at 7/28/2022 1316  Gross per 24 hour   Intake 1000 ml   Output 100 ml   Net 900 ml         Physical Exam:     Blood pressure 137/61, pulse 62, temperature 98.8 °F (37.1 °C), temperature source Oral, resp. rate 20, height 6' 1\" (1.854 m), weight 260 lb (117.9 kg), SpO2 94 %. General:    Well nourished. No overt distress, morbidly obese  Head:  Normocephalic, atraumatic  Eyes:  Sclerae appear normal. Pupils equally round. ENT:  Nares appear normal, no drainage. Moist oral mucosa  Neck:  No restricted ROM. Trachea midline. CV:   RRR. No m/r/g. No jugular venous distension. Lungs:   CTAB. No wheezing, rhonchi, or rales. Respirations even, unlabored. Abdomen: Bowel sounds present. Soft, nontender, nondistended. Extremities: No cyanosis or clubbing. No edema. Tenderness of the back is present. Skin:     No rashes and normal coloration. Warm and dry. Neuro:  CN II-XII grossly intact. Sensation intact. A&Ox3  Psych:  Normal mood and affect.       I have reviewed ordered lab tests and independently visualized imaging below:    Recent Labs:  Recent Results (from the past 48 hour(s))   CBC with Auto Differential    Collection Time: 07/27/22  7:17 PM   Result Value Ref Range    WBC 10.8 4.3 - 11.1 K/uL    RBC 3.28 (L) 4.23 - 5.6 M/uL    Hemoglobin 10.6 (L) 13.6 - 17.2 g/dL    Hematocrit 31.6 (L) 41.1 - 50.3 %    MCV 96.3 79.6 - 97.8 FL    MCH 32.3 26.1 - 32.9 PG    MCHC 33.5 31.4 - 35.0 g/dL    RDW 14.8 (H) 11.9 - 14.6 %    Platelets 256 568 - 745 K/uL    MPV 10.9 9.4 - 12.3 FL    nRBC 0.00 0.0 - 0.2 K/uL    Differential Type AUTOMATED      Seg Neutrophils 80 (H) 43 - 78 %    Lymphocytes 8 (L) 13 - 44 %    Monocytes 10 4.0 - 12.0 %    Eosinophils % 0 (L) 0.5 - 7.8 %    Basophils 1 0.0 - 2.0 %    Immature Granulocytes 2 0.0 - 5.0 %    Segs Absolute 8.6 (H) 1.7 - 8.2 K/UL    Absolute Lymph # 0.9 0.5 - 4.6 K/UL    Absolute Mono # 1.1 0.1 - 1.3 K/UL Absolute Eos # 0.0 0.0 - 0.8 K/UL    Basophils Absolute 0.1 0.0 - 0.2 K/UL    Absolute Immature Granulocyte 0.2 0.0 - 0.5 K/UL   CMP    Collection Time: 07/27/22  7:17 PM   Result Value Ref Range    Sodium 132 (L) 138 - 145 mmol/L    Potassium 4.4 3.5 - 5.1 mmol/L    Chloride 96 (L) 98 - 107 mmol/L    CO2 30 21 - 32 mmol/L    Anion Gap 6 (L) 7 - 16 mmol/L    Glucose 90 65 - 100 mg/dL    BUN 17 8 - 23 MG/DL    Creatinine 0.90 0.8 - 1.5 MG/DL    GFR African American >60 >60 ml/min/1.73m2    GFR Non- >60 >60 ml/min/1.73m2    Calcium 8.5 8.3 - 10.4 MG/DL    Total Bilirubin 1.1 0.2 - 1.1 MG/DL    ALT 94 (H) 12 - 65 U/L    AST 92 (H) 15 - 37 U/L    Alk Phosphatase 83 50 - 136 U/L    Total Protein 7.1 6.3 - 8.2 g/dL    Albumin 2.2 (L) 3.2 - 4.6 g/dL    Globulin 4.9 (H) 2.3 - 3.5 g/dL    Albumin/Globulin Ratio 0.4 (L) 1.2 - 3.5     Urinalysis    Collection Time: 07/27/22  7:17 PM   Result Value Ref Range    Color, UA DARK YELLOW      Appearance CLEAR      Specific Gravity, UA 1.022 1.001 - 1.023      pH, Urine 5.5 5.0 - 9.0      Protein, UA TRACE (A) NEG mg/dL    Glucose, UA Negative mg/dL    Ketones, Urine TRACE (A) NEG mg/dL    Bilirubin Urine Negative NEG      Blood, Urine Negative NEG      Urobilinogen, Urine 1.0 0.2 - 1.0 EU/dL    Nitrite, Urine Negative NEG      Leukocyte Esterase, Urine TRACE (A) NEG      WBC, UA 0-4 (A) NORM /hpf    RBC, UA 0-5 (A) NORM /hpf    Epithelial Cells UA 0-5 (A) NORM /hpf    BACTERIA, URINE Negative (A) NORM /hpf    Casts 2-5 (A) NORM /lpf   COVID-19, Rapid    Collection Time: 07/27/22  7:17 PM    Specimen: Nasopharyngeal   Result Value Ref Range    Source NASAL      SARS-CoV-2, Rapid Not detected NOTD     POCT Glucose    Collection Time: 07/27/22 11:26 PM   Result Value Ref Range    POC Glucose 109 (H) 65 - 100 mg/dL    Performed by: Nicolette    Hemoglobin A1c    Collection Time: 07/27/22 11:46 PM   Result Value Ref Range    Hemoglobin A1C 5.3 4.8 - 5.6 %    eAG 105 mg/dL   Basic Metabolic Panel w/ Reflex to MG    Collection Time: 07/27/22 11:46 PM   Result Value Ref Range    Sodium 134 (L) 138 - 145 mmol/L    Potassium 3.2 (L) 3.5 - 5.1 mmol/L    Chloride 97 (L) 98 - 107 mmol/L    CO2 29 21 - 32 mmol/L    Anion Gap 8 7 - 16 mmol/L    Glucose 82 65 - 100 mg/dL    BUN 16 8 - 23 MG/DL    Creatinine 1.00 0.8 - 1.5 MG/DL    GFR African American >60 >60 ml/min/1.73m2    GFR Non- >60 >60 ml/min/1.73m2    Calcium 8.6 8.3 - 10.4 MG/DL   CBC with Auto Differential    Collection Time: 07/27/22 11:46 PM   Result Value Ref Range    WBC 10.3 4.3 - 11.1 K/uL    RBC 3.21 (L) 4.23 - 5.6 M/uL    Hemoglobin 10.2 (L) 13.6 - 17.2 g/dL    Hematocrit 31.1 (L) 41.1 - 50.3 %    MCV 96.9 79.6 - 97.8 FL    MCH 31.8 26.1 - 32.9 PG    MCHC 32.8 31.4 - 35.0 g/dL    RDW 14.7 (H) 11.9 - 14.6 %    Platelets 781 694 - 234 K/uL    MPV 10.4 9.4 - 12.3 FL    nRBC 0.00 0.0 - 0.2 K/uL    Differential Type AUTOMATED      Seg Neutrophils 80 (H) 43 - 78 %    Lymphocytes 8 (L) 13 - 44 %    Monocytes 9 4.0 - 12.0 %    Eosinophils % 0 (L) 0.5 - 7.8 %    Basophils 1 0.0 - 2.0 %    Immature Granulocytes 2 0.0 - 5.0 %    Segs Absolute 8.3 (H) 1.7 - 8.2 K/UL    Absolute Lymph # 0.9 0.5 - 4.6 K/UL    Absolute Mono # 0.9 0.1 - 1.3 K/UL    Absolute Eos # 0.0 0.0 - 0.8 K/UL    Basophils Absolute 0.1 0.0 - 0.2 K/UL    Absolute Immature Granulocyte 0.2 0.0 - 0.5 K/UL   Magnesium    Collection Time: 07/27/22 11:46 PM   Result Value Ref Range    Magnesium 2.2 1.8 - 2.4 mg/dL   POCT Glucose    Collection Time: 07/28/22  7:40 AM   Result Value Ref Range    POC Glucose 116 (H) 65 - 100 mg/dL    Performed by: Sara    POCT Glucose    Collection Time: 07/28/22 11:12 AM   Result Value Ref Range    POC Glucose 156 (H) 65 - 100 mg/dL    Performed by: Sara          Other Studies:  XR LUMBAR SPINE (2-3 VIEWS)    Result Date: 7/27/2022  AUTOMATIC ADMINISTRATIVE RESULT The result for this exam can be found in the Progress note in the chart. See Progress note in the chart. CT LUMBAR SPINE WO CONTRAST    Result Date: 7/27/2022  EXAMINATION: CT LUMBAR SPINE WO CONTRAST 7/27/2022 6:21 PM ACCESSION NUMBER: [id number] COMPARISON: MRI lumbar spine 06/02/2022. INDICATION: Recent kyphoplasty with fall. TECHNIQUE: Multiple-row detector helical CT examination of the lumbar spine was performed without intravenous contrast. Multiplanar reformats were provided. Radiation dose reduction techniques were used for this study. Our CT scanners use one or all of the following: Automated exposure control, adjustment of the mA and/or kV according to patient size, iterative reconstruction. FINDINGS: Patient is status post interval kyphoplasty procedure at L2 and L4 when compared to prior MRI. Mild compression fractures are noted at these levels. No retropulsed fragments noted in the spinal canal. Multilevel degenerative disc changes are present. No malalignment. Areas of spinal canal stenosis suspected as described on recent prior MRI. Postoperative changes from kyphoplasty procedure at L2 and L4. No lumbar spine fracture or malalignment aside from the compression deformities. Multiple levels of probable spinal canal stenosis as described on recent MRI. XR CHEST PORTABLE    Result Date: 7/27/2022  XR CHEST PORTABLE 7/27/2022 6:35 PM HISTORY: Weakness. COMPARISON: Chest x-ray 3/7/2022. A portable AP view of the chest was obtained. Pulmonary infiltrate predominantly in the right upper lobe and mid to lower left lung has mostly cleared. Atelectasis or scarring right lung base appears stable. Stable cardiomegaly. Implantable cardiac device left chest and both leads are unchanged. No pneumothorax. No pleural effusions.        Current Meds:  Current Facility-Administered Medications   Medication Dose Route Frequency    albuterol sulfate HFA (PROVENTIL;VENTOLIN;PROAIR) 108 (90 Base) MCG/ACT inhaler 2 puff  2 puff Inhalation Q4H PRN    amiodarone (CORDARONE) tablet 200 mg  200 mg Oral BID    apixaban (ELIQUIS) tablet 5 mg  5 mg Oral 2 times per day    calcitonin (MIACALCIN) nasal spray 1 spray  1 spray Alternating Nares Daily    carvedilol (COREG) tablet 25 mg  25 mg Oral BID WC    clopidogrel (PLAVIX) tablet 75 mg  75 mg Oral Daily    fluticasone (FLONASE) 50 MCG/ACT nasal spray 2 spray  2 spray Each Nostril Daily    mometasone-formoterol (DULERA) 200-5 MCG/ACT inhaler 2 puff  2 puff Inhalation BID    furosemide (LASIX) tablet 20 mg  20 mg Oral Daily    latanoprost (XALATAN) 0.005 % ophthalmic solution 1 drop  1 drop Ophthalmic Daily    montelukast (SINGULAIR) tablet 10 mg  10 mg Oral Nightly    [Held by provider] rosuvastatin (CRESTOR) tablet 10 mg  10 mg Oral Daily    valsartan (DIOVAN) tablet 320 mg  320 mg Oral Daily    sodium chloride flush 0.9 % injection 5-40 mL  5-40 mL IntraVENous 2 times per day    sodium chloride flush 0.9 % injection 5-40 mL  5-40 mL IntraVENous PRN    0.9 % sodium chloride infusion   IntraVENous PRN    ondansetron (ZOFRAN-ODT) disintegrating tablet 4 mg  4 mg Oral Q8H PRN    Or    ondansetron (ZOFRAN) injection 4 mg  4 mg IntraVENous Q6H PRN    polyethylene glycol (GLYCOLAX) packet 17 g  17 g Oral Daily PRN    acetaminophen (TYLENOL) tablet 650 mg  650 mg Oral Q6H PRN    Or    acetaminophen (TYLENOL) suppository 650 mg  650 mg Rectal Q6H PRN    tuberculin injection 5 Units  5 Units IntraDERmal Once    cefTRIAXone (ROCEPHIN) 1,000 mg in sodium chloride 0.9 % 50 mL IVPB mini-bag  1,000 mg IntraVENous Q24H    glucose chewable tablet 16 g  4 tablet Oral PRN    dextrose bolus 10% 125 mL  125 mL IntraVENous PRN    Or    dextrose bolus 10% 250 mL  250 mL IntraVENous PRN    glucagon (rDNA) injection 1 mg  1 mg SubCUTAneous PRN    dextrose 10 % infusion   IntraVENous Continuous PRN    insulin lispro (HUMALOG) injection vial 0-4 Units  0-4 Units SubCUTAneous TID WC    insulin lispro (HUMALOG) injection vial 0-4 Units  0-4 Units SubCUTAneous Nightly       Signed:  Keely Armendariz MD    Part of this note may have been written by using a voice dictation software. The note has been proof read but may still contain some grammatical/other typographical errors.

## 2022-07-28 NOTE — PROGRESS NOTES
OCCUPATIONAL THERAPY Initial Assessment, Daily Note, and AM       OT Visit Days: 1  Acknowledge Orders  Time  OT Charge Capture  Rehab Caseload Tracker      Elisa Briceno is a 65 y.o. male   PRIMARY DIAGNOSIS: Back pain  Back pain [M54.9]  General weakness [R53.1]  Acute cystitis without hematuria [N30.00]  Low back pain without sciatica, unspecified back pain laterality, unspecified chronicity [M54.50]       Reason for Referral: Generalized Muscle Weakness (M62.81)  Other abnormalities of gait and mobility (R26.89)  Repeated Falls (R29.6)  History of falling (Z91.81)  Inpatient: Payor: MEDICARE / Plan: MEDICARE PART A AND B / Product Type: *No Product type* /     ASSESSMENT:     REHAB RECOMMENDATIONS:   Recommendation to date pending progress:  Setting:  Short-term Rehab    Equipment:    To Be Determined     ASSESSMENT:     Mr. Edvin Sifuentes is a 64 y/o M presenting with back pain. Pt supine on entry on RA A/O x 4. Applicable PMHx: ICM, EF 25-30%, A fib, DM II, PAD, anemia, COPD, asthma, CAD, SHERI. Pt denied light headedness, dizziness or SOB. Pt reports >5 fall(s) in past 6 months. PLOF Min A for ADLs from spouse. DME present in home; RW, SPC, SC, grab bars, raised toilet seat. Pt currently Min A with UB ADLs, SBA grooming seated, Max A with LB ADLs, Max A for toileting and Mod - Min A x 2 for functional t/fs and household mobility for ADLs with RW. Rest breaks utilized as needed throughout session. Pt presents with deficits in ADLs, functional t/fs, household mobility for ADLs and activity tolerance compared to reported PLOF. Pt tolerated session well. Pt presents pleasant and motivated with good rehab potential. Pt would benefit from continued skilled OT services for duration of hospital stay and after t/f to next level of care or until all stated goals met.       [address] [facility] [address] [address] AM-PAC 6 Clicks Daily Activity Inpatient Short Form:    AM-PAC Daily Activity Inpatient   How much help for putting on and taking off regular lower body clothing?: A Lot  How much help for Bathing?: A Lot  How much help for Toileting?: A Lot  How much help for putting on and taking off regular upper body clothing?: A Little  How much help for taking care of personal grooming?: None  How much help for eating meals?: None  AM-PAC Inpatient Daily Activity Raw Score: 17  AM-PAC Inpatient ADL T-Scale Score : 37.26  ADL Inpatient CMS 0-100% Score: 50.11  ADL Inpatient CMS G-Code Modifier : CK           SUBJECTIVE:     Mr. Sal Duarte states, \"My wife and I used to help each other but now she just helps me\"     Social/Functional Lives With: Spouse  Type of Home: House  Home Layout: Two level  Bathroom Toilet: Standard  Bathroom Accessibility: Accessible  Receives Help From: Family  ADL Assistance: Independent  Homemaking Assistance: Independent  Ambulation Assistance: Independent  Transfer Assistance: Independent  Active : Yes  Mode of Transportation: Car  Occupation: Retired    OBJECTIVE:     Rachel Sours / Green Debi / Kristen Fillers: IV    RESTRICTIONS/PRECAUTIONS:       PAIN: [place] / O2:   Pre Treatment:   Pain Assessment: 0-10  Pain Level: 8  Pain Location: Back  Pain Orientation: Lower  Non-Pharmaceutical Pain Intervention(s): Repositioned, Rest, Nurse notified (comment)  Response to Pain Intervention: Patient satisfied      Post Treatment: None       Vitals          Oxygen            GROSS EVALUATION: INTACT IMPAIRED   (See Comments)   UE AROM [x] []   UE PROM [] []NT   Strength [] LUE Strength  Gross LUE Strength: WFL  LUE Strength Comment: 3+/5  RUE Strength  Gross RUE Strength: WFL  RUE Strength Comment: 3+/5     Posture / Balance [] Sitting - Static: Fair, +  Sitting - Dynamic: Fair  Standing - Static: Fair  Standing - Dynamic: Fair, -   Sensation []  WFL   Coordination []  BUE WFL     Tone []  NA     Edema [] NA   Activity Tolerance [] Patient limited by pain     Hand Dominance R [x] L []      COGNITION/  PERCEPTION: INTACT IMPAIRED   (See Comments) Orientation [x]     Vision [x]     Hearing [x]     Cognition  [x]     Perception []       MOBILITY: I Mod I S SBA CGA Min Mod Max Total  NT x2 Comments:   Bed Mobility    Rolling [] [] [] [] [] [] [x] [] [] [] [x]    Supine to Sit [] [] [] [] [] [] [x] [] [] [] [x]    Scooting [] [] [] [] [] [] [x] [] [] [] [x]    Sit to Supine [] [] [] [] [] [] [] [] [] [] []    Transfers    Sit to Stand [] [] [] [] [] [x] [] [] [] [] [x]    Bed to Chair [] [] [] [] [] [x] [] [] [] [] [x]    Stand to Sit [] [] [] [] [] [x] [] [] [] [] [x]    Tub/Shower [] [] [] [] [] [] [] [] [] [x] []     Toilet [] [] [] [] [] [] [] [] [] [x] []      [] [] [] [] [] [] [] [] [] [] []    I=Independent, Mod I=Modified Independent, S=Supervision/Setup, SBA=Standby Assistance, CGA=Contact Guard Assistance, Min=Minimal Assistance, Mod=Moderate Assistance, Max=Maximal Assistance, Total=Total Assistance, NT=Not Tested    ACTIVITIES OF DAILY LIVING: I Mod I S SBA CGA Min Mod Max Total NT Comments   BASIC ADLs:              Upper Body Bathing  [] [] [] [] [] [x] [] [] [] [] BUE washing seated in recliner   Lower Body Bathing [] [] [] [] [] [] [] [x] [] []    Toileting [] [] [] [] [] [] [] [x] [] []    Upper Body Dressing [] [] [] [] [] [x] [] [] [] []    Lower Body Dressing [] [] [] [] [] [] [] [x] [] [] Renuka Sinning socks seated EOB   Feeding [] [x] [] [] [] [] [] [] [] []    Grooming [] [] [] [x] [] [] [] [] [] [] Face washing seated in recliner   Personal Device Care [] [] [] [] [] [] [] [] [] [x]    Functional Mobility [] [] [] [] [] [x] [x] [] [] [] Mod A x 2 bed mobility, Min A x 2 EOB->recliner   I=Independent, Mod I=Modified Independent, S=Supervision/Setup, SBA=Standby Assistance, CGA=Contact Guard Assistance, Min=Minimal Assistance, Mod=Moderate Assistance, Max=Maximal Assistance, Total=Total Assistance, NT=Not Tested    PLAN:     FREQUENCY/DURATION   OT Plan of Care: 3 times/week for duration of hospital stay or until stated goals are met, whichever comes first.    ACUTE OCCUPATIONAL THERAPY GOALS:   (Developed with and agreed upon by patient and/or caregiver.)  1. Patient will complete lower body bathing and dressing with Min A and adaptive equipment as   needed. 2. Patient will completed upper body bathing and dressing with SBA and adaptive equipment as needed. 3. Patient will complete grooming standing at sink with Mod I and adaptive equipment as needed. 4. Patient will complete toileting with CGA and adaptive equipment as needed. 5. Patient will tolerate 30 minutes of OT treatment with 1-2 rest breaks to increase activity tolerance for ADLs. 6. Patient will complete functional transfers with SBA and adaptive equipment as needed. Timeframe: 7 visits         PROBLEM LIST:   (Skilled intervention is medically necessary to address:)  Decreased ADL/Functional Activities  Decreased Activity Tolerance  Decreased Balance  Decreased Gait Ability  Decreased Strength  Decreased Transfer Abilities  Increased Pain   INTERVENTIONS PLANNED:  (Benefits and precautions of occupational therapy have been discussed with the patient.)  Self Care Training  Therapeutic Activity  Therapeutic Exercise/HEP  Neuromuscular Re-education  Manual Therapy  Education         TREATMENT:     EVALUATION: MODERATE COMPLEXITY: (Untimed Charge)    TREATMENT:   Co-Treatment PT/OT necessary due to patient's decreased overall endurance/tolerance levels, as well as need for high level skilled assistance to complete functional transfers/mobility and functional tasks  Self Care (15 minutes): Patient participated in upper body bathing, lower body dressing, and grooming ADLs in supported sitting and unsupported sitting with no verbal cueing to increase independence, decrease assistance required, and increase activity tolerance.  Patient also participated in functional mobility, functional transfer, and energy conservation training to increase independence, decrease assistance required, and increase activity tolerance.      TREATMENT GRID:  N/A    AFTER TREATMENT PRECAUTIONS: Alarm Activated, Bed/Chair Locked, Call light within reach, Chair, Heels floated, Needs within reach, and RN notified    INTERDISCIPLINARY COLLABORATION:  RN/ PCT, PT/ PTA, and OT/ KATE    EDUCATION:  Education Given To: Patient  Education Provided: Role of Therapy;Plan of Care;Energy Conservation;Precautions  Education Method: Verbal;Demonstration  Barriers to Learning: None  Education Outcome: Verbalized understanding;Demonstrated understanding;Continued education needed    TOTAL TREATMENT DURATION AND TIME:  Time In: [place]  Time Out: 4291  Minutes: [address] Arriola, OT

## 2022-07-28 NOTE — PROGRESS NOTES
PHYSICAL THERAPY Initial Assessment and Daily Note  (Link to Caseload Tracking: PT Visit Days : 1  Acknowledge Orders  Time In/Out  PT Charge Capture  Rehab Caseload Tracker    Luis Marti is a 65 y.o. male   PRIMARY DIAGNOSIS: Back pain  Back pain [M54.9]  General weakness [R53.1]  Acute cystitis without hematuria [N30.00]  Low back pain without sciatica, unspecified back pain laterality, unspecified chronicity [M54.50]       Reason for Referral: Generalized Muscle Weakness (M62.81)  Difficulty in walking, Not elsewhere classified (R26.2)  Other abnormalities of gait and mobility (R26.89)  Repeated Falls (R29.6)  History of falling (Z91.81)  Low Back Pain (M54.5)  Inpatient: Payor: MEDICARE / Plan: MEDICARE PART A AND B / Product Type: *No Product type* /     ASSESSMENT:     REHAB RECOMMENDATIONS:   Recommendation to date pending progress:  Setting:  Short-term Rehab    Equipment:    To Be Determined     ASSESSMENT:  Mr. Sommers Session presents with back pain and recent kyphoplasty for L2-4. He was d/c home after sx but has been unable to tolerate the pain and has had recent falls resulting in readmission. He was receiving help for all ADLs and dressing from his wife and states that his legs have just been giving out. Formal MMT not performed due to increased pain with all positioning. ROM limited as well due to pain. Pt needed mod A x2 for log roll technique to come to sit, again due to pain limiting mobility. He stood with min A x2 and bed raised and transferred to the chair with use of the RW, SPT and min A x2. He takes very short, shuffled steps and is generally unsteady. His activity tolerance is severely limited by pain. Pt would continue to benefit from skilled acute care PT to facilitate improvements in the above stated deficits and to maximize rehab potential. Based on today's assessment, he would also benefit from STR placement post DC.       [address] Mobility Inpatient Short Form  [facility] Mobility Inpatient   How much difficulty turning over in bed?: A Little  How much difficulty sitting down on / standing up from a chair with arms?: A Lot  How much difficulty moving from lying on back to sitting on side of bed?: A Lot  How much help from another person moving to and from a bed to a chair?: A Lot  How much help from another person needed to walk in hospital room?: A Lot  How much help from another person for climbing 3-5 steps with a railing?: Total  AM-PAC Inpatient Mobility Raw Score : 12  AM-PAC Inpatient T-Scale Score : 35.33  Mobility Inpatient CMS 0-100% Score: 68.66  Mobility Inpatient CMS G-Code Modifier : CL    SUBJECTIVE:   Mr. Tony Amaro states, \"I don't think I can walk over there, my back hurts too bad\"     Social/Functional Lives With: Spouse  Type of Home: House  Home Layout: Two level  Bathroom Toilet: Standard  Bathroom Accessibility: Accessible  Receives Help From: Family  ADL Assistance: Independent  Homemaking Assistance: Independent  Ambulation Assistance: Independent  Transfer Assistance: Independent  Active : Yes  Mode of Transportation: Car  Occupation: Retired    OBJECTIVE:     PAIN: [place] / O2: [employer] / [place] / Valiant Dawley:   Pre Treatment:   Pain Assessment: 0-10  Pain Level: 7      Post Treatment: 8/10 Vitals        Oxygen      IV    RESTRICTIONS/PRECAUTIONS:                    GROSS EVALUATION: Intact Impaired (Comments):   AROM []  Limited due to increased LBP   PROM []    Strength []  Unable to formally assess due to increased pain    Balance [] Posture: Fair  Sitting - Static: Fair, +  Sitting - Dynamic: Fair  Standing - Static: Fair  Standing - Dynamic: Fair, -   Posture [] Forward Head  Rounded Shoulders   Sensation [x]     Coordination []      Tone []     Edema []    Activity Tolerance [] Patient limited by pain    []      COGNITION/  PERCEPTION: Intact Impaired (Comments):   Orientation [x]     Vision [x]     Hearing [x]     Cognition  [x] MOBILITY: I Mod I S SBA CGA Min Mod Max Total  NT x2 Comments:   Bed Mobility    Rolling [] [] [] [] [] [] [x] [] [] [] []    Supine to Sit [] [] [] [] [] [] [x] [] [] [] [x]    Scooting [] [] [] [] [] [] [x] [] [] [] [x]    Sit to Supine [] [] [] [] [] [] [] [] [] [x] []    Transfers    Sit to Stand [] [] [] [] [] [x] [] [] [] [] [x] With bed raised   Bed to Chair [] [] [] [] [] [x] [] [] [] [] [x]    Stand to Sit [] [] [] [] [] [x] [] [] [] [] [x]     [] [] [] [] [] [] [] [] [] [] []    I=Independent, Mod I=Modified Independent, S=Supervision, SBA=Standby Assistance, CGA=Contact Guard Assistance,   Min=Minimal Assistance, Mod=Moderate Assistance, Max=Maximal Assistance, Total=Total Assistance, NT=Not Tested    GAIT: I Mod I S SBA CGA Min Mod Max Total  NT x2 Comments:   Level of Assistance [] [] [] [] [] [] [] [] [] [x] []    Distance   feet    DME N/A    Gait Quality N/A    Weightbearing Status      Stairs      I=Independent, Mod I=Modified Independent, S=Supervision, SBA=Standby Assistance, CGA=Contact Guard Assistance,   Min=Minimal Assistance, Mod=Moderate Assistance, Max=Maximal Assistance, Total=Total Assistance, NT=Not Tested    PLAN:   ACUTE PHYSICAL THERAPY GOALS:   (Developed with and agreed upon by patient and/or caregiver.)    (1.)Mr. Lucrecia Gill will move from supine to sit and sit to supine , scoot up and down, and roll side to side in bed using log roll technique with MODIFIED INDEPENDENCE within 7 treatment day(s). (2.)Mr. Lucrecia Gill will transfer from bed to chair and chair to bed with SUPERVISION using the least restrictive device within 7 treatment day(s). (3.)Mr. Doristine Mas will ambulate with STAND BY ASSIST for a minimum of 75 feet with the least restrictive device within 7 treatment day(s).   ________________________________________________________________________________________________     FREQUENCY AND DURATION: 3 times/week for duration of hospital stay or until stated goals are met, whichever comes first.    THERAPY PROGNOSIS: Fair    PROBLEM LIST:   (Skilled intervention is medically necessary to address:)  Decreased ADL/Functional Activities  Decreased Activity Tolerance  Decreased AROM/PROM  Decreased Balance  Decreased Gait Ability  Decreased Safety Awareness  Decreased Strength  Decreased Transfer Abilities  Increased Pain INTERVENTIONS PLANNED:   (Benefits and precautions of physical therapy have been discussed with the patient.)  Self Care Training  Therapeutic Activity  Therapeutic Exercise/HEP  Neuromuscular Re-education  Gait Training  Education       TREATMENT:   EVALUATION: MODERATE COMPLEXITY: (Untimed Charge)    TREATMENT:   Co-Treatment PT/OT necessary due to patient's decreased overall endurance/tolerance levels, as well as need for high level skilled assistance to complete functional transfers/mobility and functional tasks  Therapeutic Activity (15 Minutes): Therapeutic activity included Rolling, Supine to Sit, Scooting, Transfer Training, Sitting balance , and Standing balance to improve functional Activity tolerance, Balance, Mobility, and Strength.     TREATMENT GRID:  N/A    AFTER TREATMENT PRECAUTIONS: Alarm Activated, Bed/Chair Locked, Call light within reach, Chair, Needs within reach, and RN notified    INTERDISCIPLINARY COLLABORATION:  RN/ PCT, PT/ PTA, and OT/ KATE    EDUCATION: Education Given To: Patient  Education Provided: Role of Therapy;Plan of Care;Transfer Training  Education Method: Demonstration;Verbal  Barriers to Learning: None  Education Outcome: Verbalized understanding;Continued education needed    TIME IN/OUT:  Time In: 0931  Time Out: 9212  Minutes: [place] [address] [place] [address]

## 2022-07-28 NOTE — CONSULTS
[facility] [address]  [facility], [address] [facility]            Angela Mendosa is a 65 y.o. male who presents on referral from the hospitalist service with regards to weakness in the lower extremities the patient is admitted to the hospital with complaints of increased back and pelvic pain and difficulty with walking. The patient had a recent kyphoplasty with Dr. Clifford Sine he had seen Dr. Mirna Verdugo prior to that procedure and I have reviewed Dr. Ángel Tom clinical note. At that time it was documented that the patient had primary axial back pain and that he additionally had some lower extremity claudication related to lumbar spinal stenosis and it was clearly stated that a kyphoplasty would not do anything about the latter  Neck pain was denied.       Past Medical History:   Diagnosis Date    Acute respiratory failure with hypoxia ([address] 10/23/2014    MINI (acute kidney injury) ([address] 09/15/2014    MINI (acute kidney injury) ([address]     MINI (acute kidney injury) ([address]     Allergic rhinitis 11/10/2015    Asthma 11/10/2015    Benign essential hypertension 11/10/2015    Benign neoplasm of colon 11/10/2015    CAD (coronary artery disease) 11/10/2015    CAD (coronary artery disease) 1998, 1999    mix2, 3 stents [id number]    CAP (community acquired pneumonia) 12/31/2020    Cardiomyopathy ([address] 92/14/3418    Complicated wound infection 11/10/2015    COPD (chronic obstructive pulmonary disease) with emphysema ([address] 11/10/2015    COPD exacerbation ([address] 10/12/2011    COVID-19 12/31/2020    Diabetes mellitus type 2, controlled ([address] 11/10/2015    Diabetic neuropathy ([address] 11/10/2015    Disruption of wound, unspecified 10/09/2014    Encounter for long-term (current) use of other high-risk medications 11/10/2015    Extremity atherosclerosis with intermittent claudication ([address] 11/10/2015    H/O endarterectomy 11/10/2015    Hiatal hernia 11/10/2015    History of 2019 novel coronavirus disease (COVID-19) 12/31/2020-    Hyperglycemia due to type 1 diabetes mellitus ([address] 11/10/2015    Hyperlipidemia 11/10/2015    ICD (implantable cardioverter-defibrillator) in place 06/16/2022    Monomorphic ventricular tachycardia ([address] 12/31/2020    Myocardial infarction [facility]) 1999    Myocardial infarction ([facility] 11/10/2015    Obesity 11/10/2015    Orthostatic hypotension 11/10/2015    Osteoarthritis 11/10/2015    Peripheral vascular disease ([address] 11/10/2015    PNA (pneumonia) 02/08/2019    PVC (premature ventricular contraction)     Rash 22/75/9671    Seroma complicating a procedure 10/02/2014    Sleep apnea     cpap    Toe laceration     Type 2 diabetes with nephropathy ([address]     Uncontrolled type 2 diabetes mellitus ([facility] 11/10/2015    Vertiginous syndromes and other disorders of vestibular system 11/10/2015       Past Surgical History:   Procedure Laterality Date    CARDIAC CATHETERIZATION  12/05/2018    LV EF=40%. LM:nl. LAD:30-40% mid stenosis. LCX OM1:95% stenosis. RCA:100% occlusion at RV branch.     CORONARY ANGIOPLASTY WITH STENT PLACEMENT  12/05/2018    LCX OM1:2.5 x 12 [employer] RAKESH    CORONARY ANGIOPLASTY WITH STENT PLACEMENT  1999    [place] ABDOMEN [place] PROC UNLISTED  1960    abdominal cyst removed    [place] CARDIAC [place] PROCEDURE UNLIST  1999    stents x3    SPINE SURGERY N/A 7/19/2022    LUMBAR 2, LUMBAR 4 KYPHOPLASTY AND ANY OTHER INDICATED LEVELS performed by Delfino Mishra MD at [facility] MAIN OR    VASCULAR SURGERY  11/5/14    Repair of right common femoral artery     VASCULAR SURGERY  9/11/14    tiffanie femoral endarterectomy        Family History   Problem Relation Age of Onset    Breast Cancer Mother     Hypertension Mother     Other Father         DIVERTICULITIS    Crohn's Disease Sister     Lung Disease Brother         mesothioloma    Diabetes Sister     Heart Attack Father     Heart Disease Father     Stroke Mother         Social History     Socioeconomic History    Marital status:    Tobacco Use    Smoking status: Former     Packs/day: 1.00     Types: Cigarettes     Quit date: 10/2/2011     Years since quitting: 10.8    Smokeless tobacco: Current   Substance and Sexual Activity    Alcohol use:  Yes     Alcohol/week: 0.0 standard drinks    Drug use: No   Social History Narrative    Lives with wife         Current Facility-Administered Medications   Medication Dose Route Frequency Provider Last Rate Last Admin    albuterol sulfate HFA (PROVENTIL;VENTOLIN;PROAIR) 108 (90 Base) MCG/ACT inhaler 2 puff  2 puff Inhalation Q4H PRN Rory Barnes MD        amiodarone (CORDARONE) tablet 200 mg  200 mg Oral BID Rory Barnes MD   200 mg at 07/28/22 0912    apixaban (ELIQUIS) tablet 5 mg  5 mg Oral 2 times per day Rory Barnes MD   5 mg at 07/28/22 0913    calcitonin (MIACALCIN) nasal spray 1 spray  1 spray Alternating Nares Daily Rory Barnes MD        carvedilol (COREG) tablet 25 mg  25 mg Oral BID  Rory Barnes MD   25 mg at 07/28/22 0913    clopidogrel (PLAVIX) tablet 75 mg  75 mg Oral Daily Royr Barnes MD   75 mg at 07/28/22 0912    fluticasone (FLONASE) 50 MCG/ACT nasal spray 2 spray  2 spray Each Nostril Daily Rory Barnes MD   2 spray at 07/28/22 0934    mometasone-formoterol (DULERA) 200-5 MCG/ACT inhaler 2 puff  2 puff Inhalation BID Rory Barnes MD        furosemide (LASIX) tablet 20 mg  20 mg Oral Daily Rory Barnes MD   20 mg at 07/28/22 0913    latanoprost (XALATAN) 0.005 % ophthalmic solution 1 drop  1 drop Ophthalmic Daily Rory Barnes MD        montelukast (SINGULAIR) tablet 10 mg  10 mg Oral Nightly Rory Barnes MD   10 mg at 07/27/22 2357    rosuvastatin (CRESTOR) tablet 10 mg  10 mg Oral Daily Rory Barnes MD   10 mg at 07/28/22 0913    valsartan (DIOVAN) tablet 320 mg  320 mg Oral Daily Rory Barnes MD   320 mg at 07/28/22 0912    sodium chloride flush 0.9 % injection 5-40 mL  5-40 mL IntraVENous 2 times per day Ita MD Robert   5 mL at 07/28/22 0915    sodium chloride flush 0.9 % injection 5-40 mL  5-40 mL IntraVENous PRN Jarvis Moscoso MD        0.9 % sodium chloride infusion   IntraVENous PRN Jarvis Moscoso MD        ondansetron (ZOFRAN-ODT) disintegrating tablet 4 mg  4 mg Oral Q8H PRN Jarvis Moscoso MD        Or    ondansetron (ZOFRAN) injection 4 mg  4 mg IntraVENous Q6H PRN Jarvis Moscoso MD        polyethylene glycol (GLYCOLAX) packet 17 g  17 g Oral Daily PRN Jarvis Moscoso MD        acetaminophen (TYLENOL) tablet 650 mg  650 mg Oral Q6H PRN Jarvis Moscoso MD        Or    acetaminophen (TYLENOL) suppository 650 mg  650 mg Rectal Q6H PRN Jarvis Moscoso MD        tuberculin injection 5 Units  5 Units IntraDERmal Once Jarvis Moscoso MD   5 Units at 07/27/22 2359    cefTRIAXone (ROCEPHIN) 1,000 mg in sodium chloride 0.9 % 50 mL IVPB mini-bag  1,000 mg IntraVENous Q24H Jarvis Moscoso MD        glucose chewable tablet 16 g  4 tablet Oral PRN Jarvis Moscoso MD        dextrose bolus 10% 125 mL  125 mL IntraVENous PRN Jarvis Moscoso MD        Or    dextrose bolus 10% 250 mL  250 mL IntraVENous PRN Jarvis Moscoso MD        glucagon (rDNA) injection 1 mg  1 mg SubCUTAneous PRN Jarvis Moscoso MD        dextrose 10 % infusion   IntraVENous Continuous PRN Jarvis Moscoso MD        insulin lispro (HUMALOG) injection vial 0-4 Units  0-4 Units SubCUTAneous TID WC Jarvis Moscoso MD        insulin lispro (HUMALOG) injection vial 0-4 Units  0-4 Units SubCUTAneous Nightly Jarvis Moscoso MD            Allergies   Allergen Reactions    Azithromycin Hives    Oxaprozin Other (See Comments)     ELEVATED BLOOD PRESSURE       Review of Systems  While the patient does not have any complaints of bladder symptomatology there is according to his wife some issue related to fecal incontinence which has been relatively more recent.   The remainder of the review of systems please see the HPI other systems are negative  BP (!) 158/77 Comment: RN Terrelledgard Campbellbella notified. Pulse 80   Temp 99.1 °F (37.3 °C) (Oral)   Resp 18   Ht 6' 1\" (1.854 m)   Wt 260 lb (117.9 kg)   SpO2 92%   BMI 34.30 kg/m²     Neurologic Exam  Large man sitting in chair  No cranial nerve symptoms or signs. Full range of extraocular movements tongue midline face symmetric  Motor exam in the upper extremities no drift good strength in biceps triceps and in the intrinsic hand musculature. Motor examination in the lower extremities reveals foot dorsiflexion and plantar flexion strength and strength of the great toe extensors to be normal.  There is intact quad strength bilaterally there is good hip flexor strength bilaterally. The patient is overall hyporeflexic and I cannot obtain deep tendon reflexes at the knees or ankles    Most recent MRI   No results found for this or any previous visit. Most recent MRA   No results found for this or any previous visit. Most recent CTA  No results found for this or any previous visit. Most recent Echo  No results found for this or any previous visit. Most recent lipid panels  Lab Results   Component Value Date/Time    CHOL 77 03/05/2022 06:14 AM    HDL 45 03/05/2022 06:14 AM    VLDL 18 09/21/2021 02:54 PM       Most recent Hgb A1C  No results found for: HBA1C, [id number]      Assessment/Plan:  From a primary neurological standpoint I do not see any specific issues the patient has no history of neck pain. Concomitant presence of cervical stenosis with a lumbar stenosis is quite high and may contribute to lower extremity weakness. The patient appears to be indicating that his leg weakness has worsened since his kyphoplasty. Dr. Nancy Harrell clearly told the patient prior to the procedure that he had 2 problems going on and I concur after review of the MRI that he has spinal stenosis.   Dr. Nancy Harrell clearly indicated to him that he was not addressing that procedure but was more addressing his acute back pain.   We will obtain brain and cervical imaging as he has according to his wife some degree of problem with incontinence including fecal incontinence and that would be atypical as a clinical manifestation of lumbar stenosis  Will also obtain a CRP-probability of an acute osteomyelitis in the absence of fever elevated white count etc. is low but this would be exclusionary 9 there may well be a coexistent peripheral neuropathic process based upon his hyporeflexia kids from a statistical standpoint this is most likely on a diabetic neuropathic basis    It is my usual practice to discontinue the usage of statins in individuals with subacute peripheral muscular compromise for a 3-week basis in order to ascertain whether there is any degree of causality          Nahid Harris MD

## 2022-07-28 NOTE — PROGRESS NOTES
[facility]  At the heart of better care     Advance Care Planning   Admit Date:  2022  3:41 PM   Name:  Zenia Alvarenga   Age:  65 y.o. Sex:  male  :  1945   MRN:  [id number]   Room:  [facility]/    Zenia Alvarenga is able to make his own decisions:   yes    If pt unable to make decisions, POA/surrogate decision maker:  Wife Marysol Cartwright    Other members present in the meeting:   none    Patient / surrogate decision-maker directed:  DNI        Patient or surrogate consented to discussion of the current conditions, workup, management plans, prognosis, and understand the risk for further deterioration. Time spent: 10 minutes in direct discussion (face to face and/or over phone).       Signed:  Peri Omalley MD

## 2022-07-29 ENCOUNTER — HOSPITAL ENCOUNTER (INPATIENT)
Dept: MRI IMAGING | Age: 77
Discharge: HOME | DRG: 371 | End: 2022-08-01
Payer: MEDICARE

## 2022-07-29 ENCOUNTER — APPOINTMENT (OUTPATIENT)
Dept: MRI IMAGING | Age: 77
DRG: 371 | End: 2022-07-29
Payer: MEDICARE

## 2022-07-29 LAB
ANION GAP SERPL CALC-SCNC: 7 MMOL/L (ref 7–16)
BASOPHILS # BLD: 0.1 K/UL (ref 0–0.2)
BASOPHILS NFR BLD: 1 % (ref 0–2)
BUN SERPL-MCNC: 8 MG/DL (ref 8–23)
CALCIUM SERPL-MCNC: 8.4 MG/DL (ref 8.3–10.4)
CHLORIDE SERPL-SCNC: 95 MMOL/L (ref 98–107)
CO2 SERPL-SCNC: 32 MMOL/L (ref 21–32)
CREAT SERPL-MCNC: 0.6 MG/DL (ref 0.8–1.5)
CRP SERPL-MCNC: 12.4 MG/DL (ref 0–0.9)
DIFFERENTIAL METHOD BLD: ABNORMAL
EOSINOPHIL # BLD: 0 K/UL (ref 0–0.8)
EOSINOPHIL NFR BLD: 0 % (ref 0.5–7.8)
ERYTHROCYTE [DISTWIDTH] IN BLOOD BY AUTOMATED COUNT: 14.4 % (ref 11.9–14.6)
GLUCOSE BLD STRIP.AUTO-MCNC: 145 MG/DL (ref 65–100)
GLUCOSE BLD STRIP.AUTO-MCNC: 177 MG/DL (ref 65–100)
GLUCOSE BLD STRIP.AUTO-MCNC: 196 MG/DL (ref 65–100)
GLUCOSE BLD STRIP.AUTO-MCNC: 207 MG/DL (ref 65–100)
GLUCOSE SERPL-MCNC: 161 MG/DL (ref 65–100)
HAV IGM SER QL: NONREACTIVE
HBV CORE IGM SER QL: NONREACTIVE
HBV SURFACE AG SER QL: NONREACTIVE
HCT VFR BLD AUTO: 28.9 % (ref 41.1–50.3)
HCV AB SER QL: NONREACTIVE
HGB BLD-MCNC: 9.5 G/DL (ref 13.6–17.2)
IMM GRANULOCYTES # BLD AUTO: 0.1 K/UL (ref 0–0.5)
IMM GRANULOCYTES NFR BLD AUTO: 2 % (ref 0–5)
LYMPHOCYTES # BLD: 0.5 K/UL (ref 0.5–4.6)
LYMPHOCYTES NFR BLD: 6 % (ref 13–44)
MAGNESIUM SERPL-MCNC: 2 MG/DL (ref 1.8–2.4)
MCH RBC QN AUTO: 31.7 PG (ref 26.1–32.9)
MCHC RBC AUTO-ENTMCNC: 32.9 G/DL (ref 31.4–35)
MCV RBC AUTO: 96.3 FL (ref 79.6–97.8)
MM INDURATION, POC: 0 MM (ref 0–5)
MONOCYTES # BLD: 0.8 K/UL (ref 0.1–1.3)
MONOCYTES NFR BLD: 9 % (ref 4–12)
NEUTS SEG # BLD: 7.3 K/UL (ref 1.7–8.2)
NEUTS SEG NFR BLD: 82 % (ref 43–78)
NRBC # BLD: 0 K/UL (ref 0–0.2)
PHOSPHATE SERPL-MCNC: 2.6 MG/DL (ref 2.3–3.7)
PLATELET # BLD AUTO: 278 K/UL (ref 150–450)
PMV BLD AUTO: 10 FL (ref 9.4–12.3)
POTASSIUM SERPL-SCNC: 3.3 MMOL/L (ref 3.5–5.1)
PPD, POC: NEGATIVE
RBC # BLD AUTO: 3 M/UL (ref 4.23–5.6)
SERVICE CMNT-IMP: ABNORMAL
SODIUM SERPL-SCNC: 134 MMOL/L (ref 136–145)
WBC # BLD AUTO: 8.8 K/UL (ref 4.3–11.1)

## 2022-07-29 PROCEDURE — 70551 MRI BRAIN STEM W/O DYE: CPT

## 2022-07-29 PROCEDURE — 82962 GLUCOSE BLOOD TEST: CPT

## 2022-07-29 PROCEDURE — 83735 ASSAY OF MAGNESIUM: CPT

## 2022-07-29 PROCEDURE — 72141 MRI NECK SPINE W/O DYE: CPT

## 2022-07-29 PROCEDURE — 85025 COMPLETE CBC W/AUTO DIFF WBC: CPT

## 2022-07-29 PROCEDURE — 1100000000 HC RM PRIVATE

## 2022-07-29 PROCEDURE — 99232 SBSQ HOSP IP/OBS MODERATE 35: CPT | Performed by: PSYCHIATRY & NEUROLOGY

## 2022-07-29 PROCEDURE — 86140 C-REACTIVE PROTEIN: CPT

## 2022-07-29 PROCEDURE — 80048 BASIC METABOLIC PNL TOTAL CA: CPT

## 2022-07-29 PROCEDURE — 6370000000 HC RX 637 (ALT 250 FOR IP): Performed by: STUDENT IN AN ORGANIZED HEALTH CARE EDUCATION/TRAINING PROGRAM

## 2022-07-29 PROCEDURE — 84100 ASSAY OF PHOSPHORUS: CPT

## 2022-07-29 PROCEDURE — 94640 AIRWAY INHALATION TREATMENT: CPT

## 2022-07-29 PROCEDURE — 2580000003 HC RX 258: Performed by: INTERNAL MEDICINE

## 2022-07-29 PROCEDURE — 72158 MRI LUMBAR SPINE W/O & W/DYE: CPT

## 2022-07-29 PROCEDURE — 51702 INSERT TEMP BLADDER CATH: CPT

## 2022-07-29 PROCEDURE — 94760 N-INVAS EAR/PLS OXIMETRY 1: CPT

## 2022-07-29 PROCEDURE — 6370000000 HC RX 637 (ALT 250 FOR IP): Performed by: INTERNAL MEDICINE

## 2022-07-29 PROCEDURE — 51798 US URINE CAPACITY MEASURE: CPT

## 2022-07-29 PROCEDURE — 36415 COLL VENOUS BLD VENIPUNCTURE: CPT

## 2022-07-29 PROCEDURE — 2580000003 HC RX 258: Performed by: STUDENT IN AN ORGANIZED HEALTH CARE EDUCATION/TRAINING PROGRAM

## 2022-07-29 PROCEDURE — 6360000002 HC RX W HCPCS: Performed by: STUDENT IN AN ORGANIZED HEALTH CARE EDUCATION/TRAINING PROGRAM

## 2022-07-29 PROCEDURE — APPSS45 APP SPLIT SHARED TIME 31-45 MINUTES: Performed by: NURSE PRACTITIONER

## 2022-07-29 PROCEDURE — 94664 DEMO&/EVAL PT USE INHALER: CPT

## 2022-07-29 PROCEDURE — 80074 ACUTE HEPATITIS PANEL: CPT

## 2022-07-29 RX ORDER — SODIUM CHLORIDE 0.9 % (FLUSH) 0.9 %
10 SYRINGE (ML) INJECTION AS NEEDED
Status: DISCONTINUED | OUTPATIENT
Start: 2022-07-29 | End: 2022-08-02 | Stop reason: HOSPADM

## 2022-07-29 RX ORDER — POTASSIUM CHLORIDE 20 MEQ/1
40 TABLET, EXTENDED RELEASE ORAL ONCE
Status: COMPLETED | OUTPATIENT
Start: 2022-07-29 | End: 2022-07-29

## 2022-07-29 RX ORDER — ROSUVASTATIN CALCIUM 10 MG/1
TABLET, COATED ORAL
Qty: 90 TABLET | Refills: 0 | Status: SHIPPED | OUTPATIENT
Start: 2022-07-29

## 2022-07-29 RX ADMIN — POTASSIUM CHLORIDE 40 MEQ: 20 TABLET, EXTENDED RELEASE ORAL at 15:50

## 2022-07-29 RX ADMIN — VALSARTAN 320 MG: 320 TABLET, FILM COATED ORAL at 09:48

## 2022-07-29 RX ADMIN — AMIODARONE HYDROCHLORIDE 200 MG: 200 TABLET ORAL at 09:48

## 2022-07-29 RX ADMIN — APIXABAN 5 MG: 5 TABLET, FILM COATED ORAL at 21:28

## 2022-07-29 RX ADMIN — MONTELUKAST 10 MG: 10 TABLET, FILM COATED ORAL at 21:28

## 2022-07-29 RX ADMIN — SODIUM CHLORIDE, PRESERVATIVE FREE 10 ML: 5 INJECTION INTRAVENOUS at 21:28

## 2022-07-29 RX ADMIN — FLUTICASONE PROPIONATE 2 SPRAY: 50 SPRAY, METERED NASAL at 09:53

## 2022-07-29 RX ADMIN — APIXABAN 5 MG: 5 TABLET, FILM COATED ORAL at 09:48

## 2022-07-29 RX ADMIN — AMIODARONE HYDROCHLORIDE 200 MG: 200 TABLET ORAL at 21:28

## 2022-07-29 RX ADMIN — LATANOPROST 1 DROP: 50 SOLUTION OPHTHALMIC at 09:52

## 2022-07-29 RX ADMIN — CALCITONIN SALMON 1 SPRAY: 200 SPRAY, METERED NASAL at 09:57

## 2022-07-29 RX ADMIN — Medication 2500 MG: at 20:12

## 2022-07-29 RX ADMIN — CARVEDILOL 25 MG: 25 TABLET, FILM COATED ORAL at 09:48

## 2022-07-29 RX ADMIN — CLOPIDOGREL BISULFATE 75 MG: 75 TABLET ORAL at 09:48

## 2022-07-29 RX ADMIN — CARVEDILOL 25 MG: 25 TABLET, FILM COATED ORAL at 19:36

## 2022-07-29 RX ADMIN — CEFTRIAXONE 1000 MG: 1 INJECTION, POWDER, FOR SOLUTION INTRAMUSCULAR; INTRAVENOUS at 22:29

## 2022-07-29 RX ADMIN — MOMETASONE FUROATE AND FORMOTEROL FUMARATE DIHYDRATE 2 PUFF: 200; 5 AEROSOL RESPIRATORY (INHALATION) at 09:08

## 2022-07-29 RX ADMIN — MOMETASONE FUROATE AND FORMOTEROL FUMARATE DIHYDRATE 2 PUFF: 200; 5 AEROSOL RESPIRATORY (INHALATION) at 19:16

## 2022-07-29 RX ADMIN — SODIUM CHLORIDE, PRESERVATIVE FREE 10 ML: 5 INJECTION INTRAVENOUS at 09:52

## 2022-07-29 RX ADMIN — FUROSEMIDE 20 MG: 20 TABLET ORAL at 09:48

## 2022-07-29 ASSESSMENT — PAIN SCALES - WONG BAKER: WONGBAKER_NUMERICALRESPONSE: 2

## 2022-07-29 ASSESSMENT — PAIN SCALES - GENERAL
PAINLEVEL_OUTOF10: 3
PAINLEVEL_OUTOF10: 3

## 2022-07-29 NOTE — PROGRESS NOTES
Hospitalist Progress Note   Admit Date:  2022  3:41 PM   Name:  Leonardo Rai   Age:  65 y.o. Sex:  male  :  1945   MRN:  [id number]   Room:  [facility]/      Reason(s) for Admission: Back pain [M54.9]  General weakness [R53.1]  Acute cystitis without hematuria [N30.00]  Low back pain without sciatica, unspecified back pain laterality, unspecified chronicity [M54.50]     Hospital Course & Interval History:   Leonardo Rai is a 65 y.o. male with medical history of ICM with ICD/PPM, EF 30-35% afib on eliquis, DM2, obesity, PAD, anemia, COPD/ asthma, CAD, SHERI on CPAP with recent L2-4 kyphoplasty evaluated with diffuse  bilateral LE weakness. He states this has been worse since his kyphoplasty and he has had several falls. Lives with wife Kailee Augustin. Seen at ortho spine today and ordered MRI Lspine to evaluate ongoing pain and weakness. Did have negative bone biopsy with kyphoplasty. Felt worse and not better post procedure. Unable to ambulate well, using walker, has trouble getting up from seated position. No LE paresthesia and no urine issues excluding some issues starting stream. Had some loose BM and can't make it to the bathroom in time. No fever. No dysuria. Urine is darker. Has ICD/PPM- will need clearance for MRI. CT Lspine shows \"  Postoperative changes from kyphoplasty procedure at L2 and L4. No lumbar spine   fracture or malalignment aside from the compression deformities. Multiple levels   of probable spinal canal stenosis as described on recent MRI. \"     UA positive. CXR with improved infiltrates. COVID negative. Wife Kailee Augustin [phone], or [phone]     He is a DNI       Subjective/24hr Events (22): Patient is seen and examined at the bedside. He is being on the bed and saturating well on room air. He is hard of hearing. He is complaining of severe back pain. He is unable to take few steps. Spoke with the spouse at the bedside.       Assessment & Plan: Back pain  MRI brain no acute infarct,   Follow-up with MRI lumbar and cervical spine   CRP is elevated 12.4  Ortho following  Neurology following  PT,OT recommended STIR          Acute cystitis without hematuria  D 3 rocephin   urine culture showing  more than 50k gram-negative rods         DNI (do not intubate)  Noted and discussed         Elevated liver enzymes  Trend lab      Anemia    HGB 10.2, stable   Stable, chronic range for him  Trend HGB              Hyponatremia  Resolved     Ischemic cardiomyopathy  Plan:    Ventricular tachycardia ([facility]  Plan:    Coronary artery disease involving native coronary artery of native heart without angina pectoris  Plan:    PAD (peripheral artery disease) ([facility]  Plan: Active Problems:    ICD (implantable cardioverter-defibrillator) in place  Plan:     Atrial fibrillation ([facility]  Plan:   Continued eliquis, amiodarone, statin, plavix         Severe obesity (BMI 35.0-39. 9) with comorbidity ([facility]  Plan:   Needs modification          COPD (chronic obstructive pulmonary disease) ([facility])  Plan:   Resume inhalers                 DM (diabetes mellitus) type II, controlled, with peripheral vascular disorder ([facility]  Plan:   SSI              Obstructive sleep apnea  Plan:   Resume CPAP       Dispo/Discharge Planning: Anticipating hospital stay for 2 to 3 days. Diet: diabetic  VTE ppx: eliquis  Code status: DNI     Diet:  ADULT DIET; Regular; 3 carb choices (45 gm/meal);  Low Fat/Low Chol/High Fiber/ANGELA  ADULT ORAL NUTRITION SUPPLEMENT; Breakfast, Lunch, Dinner; Diabetic Oral Supplement  DVT PPx  Code status: Limited    Hospital Problems             Last Modified POA    * (Principal) Back pain 7/27/2022 Yes    ICD (implantable cardioverter-defibrillator) in place 7/27/2022 Yes    General weakness 7/27/2022 Yes    Acute cystitis without hematuria 7/27/2022 Yes    Low back pain without sciatica 7/27/2022 Yes    DNI (do not intubate) (Chronic) 7/27/2022 Yes    UTI (urinary tract infection) 7/27/2022 Yes    Elevated liver enzymes 7/27/2022 Yes    Anemia (Chronic) 7/27/2022 Yes    Hyponatremia 7/27/2022 Yes    Atrial fibrillation ([facility] 7/27/2022 Yes    Severe obesity (BMI 35.0-39. 9) with comorbidity ([facility] 7/27/2022 Yes    COPD (chronic obstructive pulmonary disease) ([facility] 7/27/2022 Yes    Intermittent spinal claudication ([facility] 7/27/2022 Yes    Ischemic cardiomyopathy 7/27/2022 Yes    Ventricular tachycardia ([facility] 7/27/2022 Yes    Coronary artery disease involving native coronary artery of native heart without angina pectoris 7/27/2022 Yes    PAD (peripheral artery disease) ([facility] 7/27/2022 Yes    Asthma 7/27/2022 Yes    DM (diabetes mellitus) type II, controlled, with peripheral vascular disorder ([facility] 7/27/2022 Yes    Obstructive sleep apnea 7/27/2022 Yes       Objective:   Patient Vitals for the past 24 hrs:   Temp Pulse Resp BP SpO2   07/29/22 1117 98.1 °F (36.7 °C) 62 20 (!) 152/72 93 %   07/29/22 0911 -- 68 16 -- 92 %   07/29/22 0733 98.3 °F (36.8 °C) 62 20 132/77 93 %   07/29/22 0233 98.4 °F (36.9 °C) 63 19 (!) 144/73 93 %   07/28/22 2249 98.8 °F (37.1 °C) 65 18 (!) 145/73 91 %   07/28/22 2024 -- 67 18 -- 96 %   07/28/22 1957 98.8 °F (37.1 °C) 64 18 (!) 144/68 97 %       Estimated body mass index is 34.3 kg/m² as calculated from the following:    Height as of this encounter: 6' 1\" (1.854 m). Weight as of 7/28/22: 260 lb (117.9 kg). Intake/Output Summary (Last 24 hours) at 7/29/2022 1520  Last data filed at 7/29/2022 0616  Gross per 24 hour   Intake 80 ml   Output 775 ml   Net -695 ml         Physical Exam:     Blood pressure (!) 152/72, pulse 62, temperature 98.1 °F (36.7 °C), temperature source Oral, resp. rate 20, height 6' 1\" (1.854 m), weight 260 lb (117.9 kg), SpO2 93 %. General:    Well nourished. No overt distress, morbidly obese  Head:  Normocephalic, atraumatic  Eyes:  Sclerae appear normal. Pupils equally round. ENT:  Nares appear normal, no drainage.  Moist oral mucosa  Neck:  No restricted ROM. Trachea midline. CV:   RRR. No m/r/g. No jugular venous distension. Lungs:   CTAB. No wheezing, rhonchi, or rales. Respirations even, unlabored. Abdomen: Bowel sounds present. Soft, nontender, nondistended. Extremities: No cyanosis or clubbing. No edema. Tenderness of the back is present. Skin:     No rashes and normal coloration. Warm and dry. Neuro:  CN II-XII grossly intact. Sensation intact. A&Ox3  Psych:  Normal mood and affect.       I have reviewed ordered lab tests and independently visualized imaging below:    Recent Labs:  Recent Results (from the past 48 hour(s))   CBC with Auto Differential    Collection Time: 07/27/22  7:17 PM   Result Value Ref Range    WBC 10.8 4.3 - 11.1 K/uL    RBC 3.28 (L) 4.23 - 5.6 M/uL    Hemoglobin 10.6 (L) 13.6 - 17.2 g/dL    Hematocrit 31.6 (L) 41.1 - 50.3 %    MCV 96.3 79.6 - 97.8 FL    MCH 32.3 26.1 - 32.9 PG    MCHC 33.5 31.4 - 35.0 g/dL    RDW 14.8 (H) 11.9 - 14.6 %    Platelets 435 683 - 111 K/uL    MPV 10.9 9.4 - 12.3 FL    nRBC 0.00 0.0 - 0.2 K/uL    Differential Type AUTOMATED      Seg Neutrophils 80 (H) 43 - 78 %    Lymphocytes 8 (L) 13 - 44 %    Monocytes 10 4.0 - 12.0 %    Eosinophils % 0 (L) 0.5 - 7.8 %    Basophils 1 0.0 - 2.0 %    Immature Granulocytes 2 0.0 - 5.0 %    Segs Absolute 8.6 (H) 1.7 - 8.2 K/UL    Absolute Lymph # 0.9 0.5 - 4.6 K/UL    Absolute Mono # 1.1 0.1 - 1.3 K/UL    Absolute Eos # 0.0 0.0 - 0.8 K/UL    Basophils Absolute 0.1 0.0 - 0.2 K/UL    Absolute Immature Granulocyte 0.2 0.0 - 0.5 K/UL   CMP    Collection Time: 07/27/22  7:17 PM   Result Value Ref Range    Sodium 132 (L) 138 - 145 mmol/L    Potassium 4.4 3.5 - 5.1 mmol/L    Chloride 96 (L) 98 - 107 mmol/L    CO2 30 21 - 32 mmol/L    Anion Gap 6 (L) 7 - 16 mmol/L    Glucose 90 65 - 100 mg/dL    BUN 17 8 - 23 MG/DL    Creatinine 0.90 0.8 - 1.5 MG/DL    GFR African American >60 >60 ml/min/1.73m2    GFR Non- >60 >60 ml/min/1.73m2    Calcium 8.5 8.3 - 10.4 MG/DL    Total Bilirubin 1.1 0.2 - 1.1 MG/DL    ALT 94 (H) 12 - 65 U/L    AST 92 (H) 15 - 37 U/L    Alk Phosphatase 83 50 - 136 U/L    Total Protein 7.1 6.3 - 8.2 g/dL    Albumin 2.2 (L) 3.2 - 4.6 g/dL    Globulin 4.9 (H) 2.3 - 3.5 g/dL    Albumin/Globulin Ratio 0.4 (L) 1.2 - 3.5     Urinalysis    Collection Time: 07/27/22  7:17 PM   Result Value Ref Range    Color, UA DARK YELLOW      Appearance CLEAR      Specific Gravity, UA 1.022 1.001 - 1.023      pH, Urine 5.5 5.0 - 9.0      Protein, UA TRACE (A) NEG mg/dL    Glucose, UA Negative mg/dL    Ketones, Urine TRACE (A) NEG mg/dL    Bilirubin Urine Negative NEG      Blood, Urine Negative NEG      Urobilinogen, Urine 1.0 0.2 - 1.0 EU/dL    Nitrite, Urine Negative NEG      Leukocyte Esterase, Urine TRACE (A) NEG      WBC, UA 0-4 (A) NORM /hpf    RBC, UA 0-5 (A) NORM /hpf    Epithelial Cells UA 0-5 (A) NORM /hpf    BACTERIA, URINE Negative (A) NORM /hpf    Casts 2-5 (A) NORM /lpf   COVID-19, Rapid    Collection Time: 07/27/22  7:17 PM    Specimen: Nasopharyngeal   Result Value Ref Range    Source NASAL      SARS-CoV-2, Rapid Not detected NOTD     POCT Glucose    Collection Time: 07/27/22 11:26 PM   Result Value Ref Range    POC Glucose 109 (H) 65 - 100 mg/dL    Performed by: Nicolette    Culture, Urine    Collection Time: 07/27/22 11:30 PM    Specimen: URINE-CLEAN CATCH   Result Value Ref Range    Special Requests NO SPECIAL REQUESTS      Culture (A)       10,000 to 50,000 COLONIES/mL GRAM NEGATIVE RODS SUBCULTURE IN PROGRESS   Hemoglobin A1c    Collection Time: 07/27/22 11:46 PM   Result Value Ref Range    Hemoglobin A1C 5.3 4.8 - 5.6 %    eAG 105 mg/dL   Basic Metabolic Panel w/ Reflex to MG    Collection Time: 07/27/22 11:46 PM   Result Value Ref Range    Sodium 134 (L) 138 - 145 mmol/L    Potassium 3.2 (L) 3.5 - 5.1 mmol/L    Chloride 97 (L) 98 - 107 mmol/L    CO2 29 21 - 32 mmol/L    Anion Gap 8 7 - 16 mmol/L    Glucose 82 65 - 100 mg/dL    BUN 16 8 - 23 MG/DL    Creatinine 1.00 0.8 - 1.5 MG/DL    GFR African American >60 >60 ml/min/1.73m2    GFR Non- >60 >60 ml/min/1.73m2    Calcium 8.6 8.3 - 10.4 MG/DL   CBC with Auto Differential    Collection Time: 07/27/22 11:46 PM   Result Value Ref Range    WBC 10.3 4.3 - 11.1 K/uL    RBC 3.21 (L) 4.23 - 5.6 M/uL    Hemoglobin 10.2 (L) 13.6 - 17.2 g/dL    Hematocrit 31.1 (L) 41.1 - 50.3 %    MCV 96.9 79.6 - 97.8 FL    MCH 31.8 26.1 - 32.9 PG    MCHC 32.8 31.4 - 35.0 g/dL    RDW 14.7 (H) 11.9 - 14.6 %    Platelets 133 970 - 690 K/uL    MPV 10.4 9.4 - 12.3 FL    nRBC 0.00 0.0 - 0.2 K/uL    Differential Type AUTOMATED      Seg Neutrophils 80 (H) 43 - 78 %    Lymphocytes 8 (L) 13 - 44 %    Monocytes 9 4.0 - 12.0 %    Eosinophils % 0 (L) 0.5 - 7.8 %    Basophils 1 0.0 - 2.0 %    Immature Granulocytes 2 0.0 - 5.0 %    Segs Absolute 8.3 (H) 1.7 - 8.2 K/UL    Absolute Lymph # 0.9 0.5 - 4.6 K/UL    Absolute Mono # 0.9 0.1 - 1.3 K/UL    Absolute Eos # 0.0 0.0 - 0.8 K/UL    Basophils Absolute 0.1 0.0 - 0.2 K/UL    Absolute Immature Granulocyte 0.2 0.0 - 0.5 K/UL   Magnesium    Collection Time: 07/27/22 11:46 PM   Result Value Ref Range    Magnesium 2.2 1.8 - 2.4 mg/dL   POCT Glucose    Collection Time: 07/28/22  7:40 AM   Result Value Ref Range    POC Glucose 116 (H) 65 - 100 mg/dL    Performed by: Sara    POCT Glucose    Collection Time: 07/28/22 11:12 AM   Result Value Ref Range    POC Glucose 156 (H) 65 - 100 mg/dL    Performed by: [email or web address]    POCT Glucose    Collection Time: 07/28/22  4:43 PM   Result Value Ref Range    POC Glucose 225 (H) 65 - 100 mg/dL    Performed by: [email or web address]    POCT Glucose    Collection Time: 07/28/22  9:15 PM   Result Value Ref Range    POC Glucose 164 (H) 65 - 100 mg/dL    Performed by: Edmond    PLEASE READ & DOCUMENT PPD TEST IN 24 HRS    Collection Time: 07/28/22 11:28 PM   Result Value Ref Range    PPD, (POC) Negative Negative    mm Induration 0 0 - 5 mm   CBC with Auto Differential    Collection Time: 07/29/22  7:18 AM   Result Value Ref Range    WBC 8.8 4.3 - 11.1 K/uL    RBC 3.00 (L) 4.23 - 5.6 M/uL    Hemoglobin 9.5 (L) 13.6 - 17.2 g/dL    Hematocrit 28.9 (L) 41.1 - 50.3 %    MCV 96.3 79.6 - 97.8 FL    MCH 31.7 26.1 - 32.9 PG    MCHC 32.9 31.4 - 35.0 g/dL    RDW 14.4 11.9 - 14.6 %    Platelets 543 337 - 506 K/uL    MPV 10.0 9.4 - 12.3 FL    nRBC 0.00 0.0 - 0.2 K/uL    Differential Type AUTOMATED      Seg Neutrophils 82 (H) 43 - 78 %    Lymphocytes 6 (L) 13 - 44 %    Monocytes 9 4.0 - 12.0 %    Eosinophils % 0 (L) 0.5 - 7.8 %    Basophils 1 0.0 - 2.0 %    Immature Granulocytes 2 0.0 - 5.0 %    Segs Absolute 7.3 1.7 - 8.2 K/UL    Absolute Lymph # 0.5 0.5 - 4.6 K/UL    Absolute Mono # 0.8 0.1 - 1.3 K/UL    Absolute Eos # 0.0 0.0 - 0.8 K/UL    Basophils Absolute 0.1 0.0 - 0.2 K/UL    Absolute Immature Granulocyte 0.1 0.0 - 0.5 K/UL   Basic Metabolic Panel w/ Reflex to MG    Collection Time: 07/29/22  7:18 AM   Result Value Ref Range    Sodium 134 (L) 136 - 145 mmol/L    Potassium 3.3 (L) 3.5 - 5.1 mmol/L    Chloride 95 (L) 98 - 107 mmol/L    CO2 32 21 - 32 mmol/L    Anion Gap 7 7 - 16 mmol/L    Glucose 161 (H) 65 - 100 mg/dL    BUN 8 8 - 23 MG/DL    Creatinine 0.60 (L) 0.8 - 1.5 MG/DL    GFR African American >60 >60 ml/min/1.73m2    GFR Non- >60 >60 ml/min/1.73m2    Calcium 8.4 8.3 - 10.4 MG/DL   Phosphorus    Collection Time: 07/29/22  7:18 AM   Result Value Ref Range    Phosphorus 2.6 2.3 - 3.7 MG/DL   Hepatitis Panel, Acute    Collection Time: 07/29/22  7:18 AM   Result Value Ref Range    Hep A IgM NONREACTIVE NR      Hep B Core Ab, IgM NONREACTIVE NR      Hepatitis B Surface Ag NONREACTIVE NR      Hepatitis C Ab NONREACTIVE NR     Magnesium    Collection Time: 07/29/22  7:18 AM   Result Value Ref Range    Magnesium 2.0 1.8 - 2.4 mg/dL   C-Reactive Protein    Collection Time: 07/29/22  7:18 AM   Result Value Ref Range    CRP 12.4 (H) 0.0 - 0.9 mg/dL   POCT Glucose    Collection Time: 07/29/22  8:08 AM   Result Value Ref Range    POC Glucose 145 (H) 65 - 100 mg/dL    Performed by: Tari Crook    POCT Glucose    Collection Time: 07/29/22 12:17 PM   Result Value Ref Range    POC Glucose 196 (H) 65 - 100 mg/dL    Performed by: Kenya          Other Studies:  XR LUMBAR SPINE (2-3 VIEWS)    Result Date: 7/27/2022  AUTOMATIC ADMINISTRATIVE RESULT The result for this exam can be found in the Progress note in the chart. See Progress note in the chart. CT LUMBAR SPINE WO CONTRAST    Result Date: 7/27/2022  EXAMINATION: CT LUMBAR SPINE WO CONTRAST 7/27/2022 6:21 PM ACCESSION NUMBER: [id number] COMPARISON: MRI lumbar spine 06/02/2022. INDICATION: Recent kyphoplasty with fall. TECHNIQUE: Multiple-row detector helical CT examination of the lumbar spine was performed without intravenous contrast. Multiplanar reformats were provided. Radiation dose reduction techniques were used for this study. Our CT scanners use one or all of the following: Automated exposure control, adjustment of the mA and/or kV according to patient size, iterative reconstruction. FINDINGS: Patient is status post interval kyphoplasty procedure at L2 and L4 when compared to prior MRI. Mild compression fractures are noted at these levels. No retropulsed fragments noted in the spinal canal. Multilevel degenerative disc changes are present. No malalignment. Areas of spinal canal stenosis suspected as described on recent prior MRI. Postoperative changes from kyphoplasty procedure at L2 and L4. No lumbar spine fracture or malalignment aside from the compression deformities. Multiple levels of probable spinal canal stenosis as described on recent MRI. XR CHEST PORTABLE    Result Date: 7/27/2022  XR CHEST PORTABLE 7/27/2022 6:35 PM HISTORY: Weakness. COMPARISON: Chest x-ray 3/7/2022. A portable AP view of the chest was obtained.      Pulmonary

## 2022-07-29 NOTE — PROGRESS NOTES
ORTHO PROGRESS NOTE    2022  Admit Date: 2022  Admit Diagnosis: Back pain [M54.9]  General weakness [R53.1]  Acute cystitis without hematuria [N30.00]  Low back pain without sciatica, unspecified back pain laterality, unspecified chronicity [M54.50]      Subjective: Angela Mendosa is a patient who had a two-level kyphoplasty by Dr. Mirna Verdugo without any improvement. Patient has known spinal stenosis at L4-5 that is moderate to severe in nature with foraminal stenosis. Is also other levels with moderate stenosis. He also has severe facet arthropathy. He has complaints of chronic back pain and leg weakness. He was admitted for further work-up after he suffered another fall. Dr. Mirna Verdugo had ordered an MRI outpatient. He is scheduled to undergo an MRI inpatient in addition to cervical spine and brain MRI. Neurology was consulted for his complaints of leg weakness. Patient was also found to have acute cystitis on admission. Today he is lying in bed. He has his CPAP on. He states that he is still hurting. Hoping that we can offer a \"fix\". Objective:     Vital Signs:    Blood pressure (!) 152/72, pulse 62, temperature 98.1 °F (36.7 °C), temperature source Oral, resp. rate 20, height 6' 1\" (1.854 m), weight 260 lb (117.9 kg), SpO2 93 %. Temp (24hrs), Av.5 °F (36.9 °C), Min:98.1 °F (36.7 °C), Max:98.8 °F (37.1 °C)      No intake/output data recorded.  190 -  0700  In: 1080 [I.V.:80]  Out: 875 [Urine:875]    LAB:    Recent Labs     22  0718   HGB 9.5*   WBC 8.8          Physical Exam    General:   Alert and oriented. No acute distress  Lungs:  Respirations unlabored. Extremities: No evidence of cyanosis. Calves soft, nontender. Moves both upper and lower extremities.          Assessment:      Patient Active Problem List   Diagnosis    H/O endarterectomy    Arterial degeneration    Atrial fibrillation ([address]    Complicated wound infection    Allergic rhinitis Elevated troponin    HTN (hypertension)    Pulmonary emphysema (HCC)    Atherosclerosis of native arteries of extremity with intermittent claudication (HCC)    Severe obesity (BMI 35.0-39. 9) with comorbidity (HCC)    COPD (chronic obstructive pulmonary disease) (HCC)    Personal history of tobacco use, presenting hazards to health    Intermittent spinal claudication (HCC)    Cigarette nicotine dependence in remission    Irregular heart beat    Acute metabolic encephalopathy    Chest pain    Hiatal hernia    Diabetic neuropathy (HCC)    Normocytic anemia    Chronic pain of right knee    Sleep apnea    Osteoarthritis    Encounter for long-term (current) drug use    Ischemic cardiomyopathy    Ventricular tachycardia (HCC)    VT (ventricular tachycardia) (HCC)    Coronary artery disease involving native coronary artery of native heart without angina pectoris    Benign neoplasm of colon    PAD (peripheral artery disease) (HCC)    Asthma    Orthostatic hypotension    Hyperlipidemia    S/P angioplasty with stent    DM (diabetes mellitus) type II, controlled, with peripheral vascular disorder (HCC)    Toe laceration    Obstructive sleep apnea    MINI (acute kidney injury) ([address]    Type 2 diabetes with nephropathy (HCC)    Hypoxia    Abnormal nuclear cardiac imaging test    PVC's (premature ventricular contractions)    Asbestos exposure    Sepsis ([address]    ICD (implantable cardioverter-defibrillator) in place    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, vertebra(e), init ([address]    General weakness    Acute cystitis without hematuria    Low back pain without sciatica    Back pain    DNI (do not intubate)    UTI (urinary tract infection)    Elevated liver enzymes    Anemia    Hyponatremia       Plan:     Continue PT/OT  MRI of the lumbar spine has been scheduled. Will rule out any new acute fractures with this recent fall.   I suspect patient is symptomatic of his spinal stenosis most likely causing neurogenic claudication symptoms. This would be handled with interventional management.     Signed By: AARON Quigley - DARIO

## 2022-07-29 NOTE — PROGRESS NOTES
VANCO DAILY FOLLOW UP NOTE  [address] [facility] Pharmacokinetic Monitoring Service - Vancomycin    Consulting Provider: Dr. Kishor Peck   Indication: osteomyelitis  Target Concentration: Goal AUC/GIOVANNY 400-600 mg*hr/L  Day of Therapy: 1  Additional Antimicrobials: ceftriaxone    Pertinent Laboratory Values: Wt Readings from Last 1 Encounters:   07/27/22 260 lb (117.9 kg)     Temp Readings from Last 1 Encounters:   07/29/22 97.6 °F (36.4 °C) (Oral)     Recent Labs     07/27/22  1917 07/27/22  2346 07/29/22  0718   BUN 17 16 8   CREATININE 0.90 1.00 0.60*   WBC 10.8 10.3 8.8     Estimated Creatinine Clearance: 136 mL/min (A) (based on SCr of 0.6 mg/dL (L)). No results found for: Lorraine Reed    MRSA Nasal Swab: N/A. Non-respiratory infection. .      Assessment:  Date/Time Dose Concentration AUC         Note: Serum concentrations collected for AUC dosing may appear elevated if collected in close proximity to the dose administered, this is not necessarily an indication of toxicity    Plan:  Dosing recommendations based on Bayesian software  Start vancomycin 2500 mg IV x 1 followed by 1500 mg IV q18h  Anticipated AUC of 501 and trough concentration of 15.6 at steady state  Renal labs as indicated   Vancomycin concentrations will be ordered as clinically appropriate   Pharmacy will continue to monitor patient and adjust therapy as indicated    Thank you for the consult,  Lazarus Stafford, [address] [facility]

## 2022-07-29 NOTE — PLAN OF CARE
Problem: Pain  Goal: Verbalizes/displays adequate comfort level or baseline comfort level  Outcome: Progressing     Problem: Safety - Adult  Goal: Free from fall injury  Outcome: Progressing  Flowsheets (Taken 7/29/2022 0850)  Free From Fall Injury:   Instruct family/caregiver on patient safety   Based on caregiver fall risk screen, instruct family/caregiver to ask for assistance with transferring infant if caregiver noted to have fall risk factors     Problem: Chronic Conditions and Co-morbidities  Goal: Patient's chronic conditions and co-morbidity symptoms are monitored and maintained or improved  Outcome: Progressing  Flowsheets (Taken 7/29/2022 0910)  Care Plan - Patient's Chronic Conditions and Co-Morbidity Symptoms are Monitored and Maintained or Improved:   Monitor and assess patient's chronic conditions and comorbid symptoms for stability, deterioration, or improvement   Collaborate with multidisciplinary team to address chronic and comorbid conditions and prevent exacerbation or deterioration   Update acute care plan with appropriate goals if chronic or comorbid symptoms are exacerbated and prevent overall improvement and discharge

## 2022-07-29 NOTE — CARE COORDINATION
CM met with pt at the bedside this morning to discuss PT/OT recommendations of STR at d/c. Pt discussed how he is weak and does not like being a burden to his spouse and that he wants to get his strength back. CM provided pt with a list of SNFs and requested that he discuss with his spouse when she arrives and to please choose at least 3 facilities. CM also discussed with pt the option of a referral to [facility] [address] [facility] [address] if he would prefer to receive rehab there instead. CM to f/u with pt this afternoon s/p MRIs and when spouse is in the room.

## 2022-07-29 NOTE — PROGRESS NOTES
Neurology Daily Progress Note     Assessment:     65year old male seen for bilateral lower extremity weakness. Hx of recent kyphoplasty and lumbar stenosis with claudication. Awaiting MRI brain and MRI w/wo contrast of cervical and thoracic spine. and CRP. Differential: cervical stenosis with a lumbar stenosis v.  spine cord infarct s/p kyphoplasty. Plan:     Continue supportive therapy    CRP     MRI brain    MRI of cervical and thoracic spine  w/wo pending    Subjective: Interval history:    No clinical change. Continues to endorse BLE weakness, positive babinski bilatertally. Awaiting MRI of brain, cervical and thoracic spine and CRP. History:    Chloe Goyal is a 65 y.o. male who is being seen for BLE weakness. Review of systems negative with exception of pertinent positives and negatives noted above.        Objective:     Vitals:    07/28/22 2249 07/29/22 0233 07/29/22 0733 07/29/22 0911   BP: (!) 145/73 (!) 144/73 132/77    Pulse: 65 63 62 68   Resp: 18 19 20 16   Temp: 98.8 °F (37.1 °C) 98.4 °F (36.9 °C) 98.3 °F (36.8 °C)    TempSrc: Oral Oral Oral    SpO2: 91% 93% 93% 92%   Weight:       Height:              Current Facility-Administered Medications:     albuterol sulfate HFA (PROVENTIL;VENTOLIN;PROAIR) 108 (90 Base) MCG/ACT inhaler 2 puff, 2 puff, Inhalation, Q4H PRN, Elisha Fernandez MD    amiodarone (CORDARONE) tablet 200 mg, 200 mg, Oral, BID, Elisha Fernandez MD, 200 mg at 07/29/22 0948    apixaban (ELIQUIS) tablet 5 mg, 5 mg, Oral, 2 times per day, Elisha Fernandez MD, 5 mg at 07/29/22 0948    calcitonin (MIACALCIN) nasal spray 1 spray, 1 spray, Alternating Nares, Daily, Elisha Fernandez MD, 1 spray at 07/28/22 1100    carvedilol (COREG) tablet 25 mg, 25 mg, Oral, BID WC, Elisha Fernandez MD, 25 mg at 07/29/22 0948    clopidogrel (PLAVIX) tablet 75 mg, 75 mg, Oral, Daily, Elisha Fernandez MD, 75 mg at 07/29/22 0948    fluticasone (FLONASE) 50 MCG/ACT nasal spray 2 spray, 2 spray, Each Nostril, Daily, Lewanda Bumpers, MD, 2 spray at 07/28/22 0934    mometasone-formoterol (DULERA) 200-5 MCG/ACT inhaler 2 puff, 2 puff, Inhalation, BID, Lewanda Bumpers, MD, 2 puff at 07/29/22 0908    furosemide (LASIX) tablet 20 mg, 20 mg, Oral, Daily, Lewanda Bumpers, MD, 20 mg at 07/29/22 0948    latanoprost (XALATAN) 0.005 % ophthalmic solution 1 drop, 1 drop, Ophthalmic, Daily, Lewanda Bumpers, MD, 1 drop at 07/28/22 1100    montelukast (SINGULAIR) tablet 10 mg, 10 mg, Oral, Nightly, Lewanda Bumpers, MD, 10 mg at 07/28/22 2155    [Held by provider] rosuvastatin (CRESTOR) tablet 10 mg, 10 mg, Oral, Daily, Lewanda Bumpers, MD, 10 mg at 07/28/22 0913    valsartan (DIOVAN) tablet 320 mg, 320 mg, Oral, Daily, Lewanda Bumpers, MD, 320 mg at 07/29/22 0948    sodium chloride flush 0.9 % injection 5-40 mL, 5-40 mL, IntraVENous, 2 times per day, Lewanda Bumpers, MD, 10 mL at 07/28/22 2156    sodium chloride flush 0.9 % injection 5-40 mL, 5-40 mL, IntraVENous, PRN, Lewanda Bumpers, MD    0.9 % sodium chloride infusion, , IntraVENous, PRN, Lewanda Bumpers, MD    ondansetron (ZOFRAN-ODT) disintegrating tablet 4 mg, 4 mg, Oral, Q8H PRN **OR** ondansetron (ZOFRAN) injection 4 mg, 4 mg, IntraVENous, Q6H PRN, Lewanda Bumpers, MD    polyethylene glycol (GLYCOLAX) packet 17 g, 17 g, Oral, Daily PRN, Lewanda Bumpers, MD    acetaminophen (TYLENOL) tablet 650 mg, 650 mg, Oral, Q6H PRN **OR** acetaminophen (TYLENOL) suppository 650 mg, 650 mg, Rectal, Q6H PRN, Lewanda Bumpers, MD    cefTRIAXone (ROCEPHIN) 1,000 mg in sodium chloride 0.9 % 50 mL IVPB mini-bag, 1,000 mg, IntraVENous, Q24H, Lewanda Bumpers, MD, Stopped at 07/28/22 7884    glucose chewable tablet 16 g, 4 tablet, Oral, PRN, Lewanda Bumpers, MD    dextrose bolus 10% 125 mL, 125 mL, IntraVENous, PRN **OR** dextrose bolus 10% 250 mL, 250 mL, IntraVENous, PRN, Lewanda Bumpers, MD    glucagon (rDNA) injection 1 mg, 1 mg, SubCUTAneous, PRN, Anselmo Briones MD    dextrose 10 % infusion, , IntraVENous, Continuous PRN, Anselmo Briones MD    insulin lispro (HUMALOG) injection vial 0-4 Units, 0-4 Units, SubCUTAneous, TID WC, Anselmo Briones MD, 1 Units at 07/28/22 1751    insulin lispro (HUMALOG) injection vial 0-4 Units, 0-4 Units, SubCUTAneous, Nightly, Anselmo Briones MD    Recent Results (from the past 12 hour(s))   PLEASE READ & DOCUMENT PPD TEST IN 24 HRS    Collection Time: 07/28/22 11:28 PM   Result Value Ref Range    PPD, (POC) Negative Negative    mm Induration 0 0 - 5 mm   CBC with Auto Differential    Collection Time: 07/29/22  7:18 AM   Result Value Ref Range    WBC 8.8 4.3 - 11.1 K/uL    RBC 3.00 (L) 4.23 - 5.6 M/uL    Hemoglobin 9.5 (L) 13.6 - 17.2 g/dL    Hematocrit 28.9 (L) 41.1 - 50.3 %    MCV 96.3 79.6 - 97.8 FL    MCH 31.7 26.1 - 32.9 PG    MCHC 32.9 31.4 - 35.0 g/dL    RDW 14.4 11.9 - 14.6 %    Platelets 417 911 - 410 K/uL    MPV 10.0 9.4 - 12.3 FL    nRBC 0.00 0.0 - 0.2 K/uL    Differential Type AUTOMATED      Seg Neutrophils 82 (H) 43 - 78 %    Lymphocytes 6 (L) 13 - 44 %    Monocytes 9 4.0 - 12.0 %    Eosinophils % 0 (L) 0.5 - 7.8 %    Basophils 1 0.0 - 2.0 %    Immature Granulocytes 2 0.0 - 5.0 %    Segs Absolute 7.3 1.7 - 8.2 K/UL    Absolute Lymph # 0.5 0.5 - 4.6 K/UL    Absolute Mono # 0.8 0.1 - 1.3 K/UL    Absolute Eos # 0.0 0.0 - 0.8 K/UL    Basophils Absolute 0.1 0.0 - 0.2 K/UL    Absolute Immature Granulocyte 0.1 0.0 - 0.5 K/UL   Basic Metabolic Panel w/ Reflex to MG    Collection Time: 07/29/22  7:18 AM   Result Value Ref Range    Sodium 134 (L) 136 - 145 mmol/L    Potassium 3.3 (L) 3.5 - 5.1 mmol/L    Chloride 95 (L) 98 - 107 mmol/L    CO2 32 21 - 32 mmol/L    Anion Gap 7 7 - 16 mmol/L    Glucose 161 (H) 65 - 100 mg/dL    BUN 8 8 - 23 MG/DL    Creatinine 0.60 (L) 0.8 - 1.5 MG/DL    GFR African American >60 >60 ml/min/1.73m2    GFR Non- >60 >60 ml/min/1.73m2 Calcium 8.4 8.3 - 10.4 MG/DL   Phosphorus    Collection Time: 07/29/22  7:18 AM   Result Value Ref Range    Phosphorus 2.6 2.3 - 3.7 MG/DL   Hepatitis Panel, Acute    Collection Time: 07/29/22  7:18 AM   Result Value Ref Range    Hep A IgM NONREACTIVE NR      Hep B Core Ab, IgM NONREACTIVE NR      Hepatitis B Surface Ag NONREACTIVE NR      Hepatitis C Ab NONREACTIVE NR     Magnesium    Collection Time: 07/29/22  7:18 AM   Result Value Ref Range    Magnesium 2.0 1.8 - 2.4 mg/dL   POCT Glucose    Collection Time: 07/29/22  8:08 AM   Result Value Ref Range    POC Glucose 145 (H) 65 - 100 mg/dL    Performed by: Radha          Physical Exam:  General - Well developed, obese, in no apparent distress. Pleasant and conversent. HEENT - Normocephalic, atraumatic. Conjunctiva are clear. Neck - Supple without masses  Cardiovascular - Regular rate and rhythm. Lungs - Clear to auscultation. Abdomen - Soft, nontender with normal bowel sounds. Extremities - Peripheral pulses intact. No edema and no rashes. Neurological examination - Comprehension, attention, memory and reasoning are intact. Language and speech are normal.   On cranial nerve examination, (II, III, IV, VI) pupils are equal, round, and reactive to light. Visual acuity is adequate. Visual fields are full to finger confrontation. Extraocular motility is normal. (V, VII) Face is symmetric and sensation is intact to light touch. (VIII) Hearing is intact. (IX, X) Palate and uvula elevate symmetrically. Voice is normal. (XI) Shoulder shrug is strong and equal bilaterally. (XII)Tongue is midline. Motor examination - There is normal muscle tone and bulk. Power is 5/5 BUE, 3/5 proximal weakness in BLE., 4/5 distal BLE with 5/5 dorisflexion/plantarflexion. Muscle stretch reflexes are 1+ BUE, areflexic patellar and ankle jerks. Plantar response is extensor bilaterally.  Sensation is intact to light touch, pinprick, vibration and proprioception in all extremities.  Cerebellar examination is normal.   Signed By: Rashida Ho, APRN     July 29, 2022      Neurology attending  High-grade cervical stenosis identified on MRI  Lumbar MRI is pending  Does have clearly upgoing toes and I have concern about cord compromise

## 2022-07-30 ENCOUNTER — APPOINTMENT (OUTPATIENT)
Dept: NON INVASIVE DIAGNOSTICS | Age: 77
DRG: 371 | End: 2022-07-30
Payer: MEDICARE

## 2022-07-30 LAB
ANION GAP SERPL CALC-SCNC: 5 MMOL/L (ref 7–16)
APTT PPP: 36.6 SEC (ref 24.1–35.1)
APTT PPP: 41.8 SEC (ref 24.1–35.1)
BASOPHILS # BLD: 0 K/UL (ref 0–0.2)
BASOPHILS NFR BLD: 0 % (ref 0–2)
BUN SERPL-MCNC: 7 MG/DL (ref 8–23)
CALCIUM SERPL-MCNC: 8.7 MG/DL (ref 8.3–10.4)
CHLORIDE SERPL-SCNC: 96 MMOL/L (ref 98–107)
CO2 SERPL-SCNC: 31 MMOL/L (ref 21–32)
CREAT SERPL-MCNC: 0.7 MG/DL (ref 0.8–1.5)
DIFFERENTIAL METHOD BLD: ABNORMAL
ECHO AO ASC DIAM: 3.7 CM
ECHO AO ASCENDING AORTA INDEX: 1.54 CM/M2
ECHO AO ROOT DIAM: 3.9 CM
ECHO AO ROOT INDEX: 1.62 CM/M2
ECHO AV AREA PEAK VELOCITY: 3.1 CM2
ECHO AV AREA VTI: 3 CM2
ECHO AV AREA/BSA PEAK VELOCITY: 1.3 CM2/M2
ECHO AV AREA/BSA VTI: 1.2 CM2/M2
ECHO AV MEAN GRADIENT: 7 MMHG
ECHO AV MEAN VELOCITY: 1.3 M/S
ECHO AV PEAK GRADIENT: 13 MMHG
ECHO AV PEAK VELOCITY: 1.8 M/S
ECHO AV VELOCITY RATIO: 0.61
ECHO AV VTI: 42.5 CM
ECHO BSA: 2.46 M2
ECHO EST RA PRESSURE: 8 MMHG
ECHO IVC PROX: 2.7 CM
ECHO LA AREA 2C: 25.6 CM2
ECHO LA AREA 4C: 26.4 CM2
ECHO LA DIAMETER INDEX: 1.87 CM/M2
ECHO LA DIAMETER: 4.5 CM
ECHO LA MAJOR AXIS: 6.9 CM
ECHO LA MINOR AXIS: 7 CM
ECHO LA TO AORTIC ROOT RATIO: 1.15
ECHO LA VOL BP: 81 ML (ref 18–58)
ECHO LA VOL/BSA BIPLANE: 34 ML/M2 (ref 16–34)
ECHO LV E' LATERAL VELOCITY: 8 CM/S
ECHO LV E' SEPTAL VELOCITY: 7 CM/S
ECHO LV EDV A2C: 256 ML
ECHO LV EDV A4C: 283 ML
ECHO LV EDV INDEX A4C: 117 ML/M2
ECHO LV EDV NDEX A2C: 106 ML/M2
ECHO LV EJECTION FRACTION A2C: 48 %
ECHO LV EJECTION FRACTION A4C: 54 %
ECHO LV EJECTION FRACTION BIPLANE: 52 % (ref 55–100)
ECHO LV ESV A2C: 134 ML
ECHO LV ESV A4C: 130 ML
ECHO LV ESV INDEX A2C: 56 ML/M2
ECHO LV ESV INDEX A4C: 54 ML/M2
ECHO LV FRACTIONAL SHORTENING: 25 % (ref 28–44)
ECHO LV INTERNAL DIMENSION DIASTOLE INDEX: 2.78 CM/M2
ECHO LV INTERNAL DIMENSION DIASTOLIC: 6.7 CM (ref 4.2–5.9)
ECHO LV INTERNAL DIMENSION SYSTOLIC INDEX: 2.07 CM/M2
ECHO LV INTERNAL DIMENSION SYSTOLIC: 5 CM
ECHO LV IVSD: 0.9 CM (ref 0.6–1)
ECHO LV MASS 2D: 279.6 G (ref 88–224)
ECHO LV MASS INDEX 2D: 116 G/M2 (ref 49–115)
ECHO LV POSTERIOR WALL DIASTOLIC: 1 CM (ref 0.6–1)
ECHO LV RELATIVE WALL THICKNESS RATIO: 0.3
ECHO LVOT AREA: 4.9 CM2
ECHO LVOT AV VTI INDEX: 0.61
ECHO LVOT DIAM: 2.5 CM
ECHO LVOT MEAN GRADIENT: 3 MMHG
ECHO LVOT PEAK GRADIENT: 5 MMHG
ECHO LVOT PEAK VELOCITY: 1.1 M/S
ECHO LVOT STROKE VOLUME INDEX: 52.5 ML/M2
ECHO LVOT SV: 126.6 ML
ECHO LVOT VTI: 25.8 CM
ECHO MV A VELOCITY: 1.04 M/S
ECHO MV AREA VTI: 2.7 CM2
ECHO MV E DECELERATION TIME (DT): 219 MS
ECHO MV E VELOCITY: 1.38 M/S
ECHO MV E/A RATIO: 1.33
ECHO MV E/E' LATERAL: 17.25
ECHO MV E/E' RATIO (AVERAGED): 18.48
ECHO MV E/E' SEPTAL: 19.71
ECHO MV LVOT VTI INDEX: 1.83
ECHO MV MAX VELOCITY: 1.7 M/S
ECHO MV MEAN GRADIENT: 4 MMHG
ECHO MV MEAN VELOCITY: 0.9 M/S
ECHO MV PEAK GRADIENT: 11 MMHG
ECHO MV REGURGITANT PEAK GRADIENT: 125 MMHG
ECHO MV REGURGITANT PEAK VELOCITY: 5.6 M/S
ECHO MV REGURGITANT VTIA: 193 CM
ECHO MV VTI: 47.1 CM
ECHO PV ACCELERATION TIME (AT): 71 MS
ECHO PV MAX VELOCITY: 1.3 M/S
ECHO PV PEAK GRADIENT: 7 MMHG
ECHO RIGHT VENTRICULAR SYSTOLIC PRESSURE (RVSP): 27 MMHG
ECHO RV BASAL DIMENSION: 4.6 CM
ECHO RV INTERNAL DIMENSION: 4.1 CM
ECHO RV TAPSE: 2.4 CM (ref 1.7–?)
ECHO TV REGURGITANT MAX VELOCITY: 2.17 M/S
ECHO TV REGURGITANT PEAK GRADIENT: 19 MMHG
EOSINOPHIL # BLD: 0 K/UL (ref 0–0.8)
EOSINOPHIL NFR BLD: 0 % (ref 0.5–7.8)
ERYTHROCYTE [DISTWIDTH] IN BLOOD BY AUTOMATED COUNT: 14.5 % (ref 11.9–14.6)
GLUCOSE BLD STRIP.AUTO-MCNC: 162 MG/DL (ref 65–100)
GLUCOSE BLD STRIP.AUTO-MCNC: 174 MG/DL (ref 65–100)
GLUCOSE BLD STRIP.AUTO-MCNC: 208 MG/DL (ref 65–100)
GLUCOSE BLD STRIP.AUTO-MCNC: 214 MG/DL (ref 65–100)
GLUCOSE SERPL-MCNC: 160 MG/DL (ref 65–100)
HCT VFR BLD AUTO: 29.4 % (ref 41.1–50.3)
HGB BLD-MCNC: 9.8 G/DL (ref 13.6–17.2)
IMM GRANULOCYTES # BLD AUTO: 0.1 K/UL (ref 0–0.5)
IMM GRANULOCYTES NFR BLD AUTO: 1 % (ref 0–5)
INR PPP: 1.3
LYMPHOCYTES # BLD: 0.7 K/UL (ref 0.5–4.6)
LYMPHOCYTES NFR BLD: 9 % (ref 13–44)
MAGNESIUM SERPL-MCNC: 1.8 MG/DL (ref 1.8–2.4)
MCH RBC QN AUTO: 32 PG (ref 26.1–32.9)
MCHC RBC AUTO-ENTMCNC: 33.3 G/DL (ref 31.4–35)
MCV RBC AUTO: 96.1 FL (ref 79.6–97.8)
MONOCYTES # BLD: 0.6 K/UL (ref 0.1–1.3)
MONOCYTES NFR BLD: 8 % (ref 4–12)
NEUTS SEG # BLD: 6 K/UL (ref 1.7–8.2)
NEUTS SEG NFR BLD: 82 % (ref 43–78)
NRBC # BLD: 0 K/UL (ref 0–0.2)
PHOSPHATE SERPL-MCNC: 2.6 MG/DL (ref 2.3–3.7)
PLATELET # BLD AUTO: 265 K/UL (ref 150–450)
PMV BLD AUTO: 9.9 FL (ref 9.4–12.3)
POTASSIUM SERPL-SCNC: 3.3 MMOL/L (ref 3.5–5.1)
PROTHROMBIN TIME: 16.3 SEC (ref 12.6–14.5)
RBC # BLD AUTO: 3.06 M/UL (ref 4.23–5.6)
SERVICE CMNT-IMP: ABNORMAL
SODIUM SERPL-SCNC: 132 MMOL/L (ref 138–145)
WBC # BLD AUTO: 7.4 K/UL (ref 4.3–11.1)

## 2022-07-30 PROCEDURE — 85730 THROMBOPLASTIN TIME PARTIAL: CPT

## 2022-07-30 PROCEDURE — 80048 BASIC METABOLIC PNL TOTAL CA: CPT

## 2022-07-30 PROCEDURE — 94760 N-INVAS EAR/PLS OXIMETRY 1: CPT

## 2022-07-30 PROCEDURE — 84100 ASSAY OF PHOSPHORUS: CPT

## 2022-07-30 PROCEDURE — 1100000000 HC RM PRIVATE

## 2022-07-30 PROCEDURE — C8929 TTE W OR WO FOL WCON,DOPPLER: HCPCS

## 2022-07-30 PROCEDURE — 6360000002 HC RX W HCPCS: Performed by: STUDENT IN AN ORGANIZED HEALTH CARE EDUCATION/TRAINING PROGRAM

## 2022-07-30 PROCEDURE — 94640 AIRWAY INHALATION TREATMENT: CPT

## 2022-07-30 PROCEDURE — 36415 COLL VENOUS BLD VENIPUNCTURE: CPT

## 2022-07-30 PROCEDURE — 98960 EDU&TRN PT SELF-MGMT NQHP 1: CPT

## 2022-07-30 PROCEDURE — 94660 CPAP INITIATION&MGMT: CPT

## 2022-07-30 PROCEDURE — 83735 ASSAY OF MAGNESIUM: CPT

## 2022-07-30 PROCEDURE — 6370000000 HC RX 637 (ALT 250 FOR IP): Performed by: INTERNAL MEDICINE

## 2022-07-30 PROCEDURE — 87040 BLOOD CULTURE FOR BACTERIA: CPT

## 2022-07-30 PROCEDURE — 6360000004 HC RX CONTRAST MEDICATION: Performed by: STUDENT IN AN ORGANIZED HEALTH CARE EDUCATION/TRAINING PROGRAM

## 2022-07-30 PROCEDURE — 2580000003 HC RX 258: Performed by: STUDENT IN AN ORGANIZED HEALTH CARE EDUCATION/TRAINING PROGRAM

## 2022-07-30 PROCEDURE — 85025 COMPLETE CBC W/AUTO DIFF WBC: CPT

## 2022-07-30 PROCEDURE — 82962 GLUCOSE BLOOD TEST: CPT

## 2022-07-30 PROCEDURE — 2580000003 HC RX 258: Performed by: INTERNAL MEDICINE

## 2022-07-30 PROCEDURE — 93306 TTE W/DOPPLER COMPLETE: CPT | Performed by: INTERNAL MEDICINE

## 2022-07-30 PROCEDURE — 85610 PROTHROMBIN TIME: CPT

## 2022-07-30 PROCEDURE — 99232 SBSQ HOSP IP/OBS MODERATE 35: CPT | Performed by: PSYCHIATRY & NEUROLOGY

## 2022-07-30 RX ORDER — HEPARIN SODIUM 1000 [USP'U]/ML
4000 INJECTION, SOLUTION INTRAVENOUS; SUBCUTANEOUS ONCE
Status: COMPLETED | OUTPATIENT
Start: 2022-07-30 | End: 2022-07-30

## 2022-07-30 RX ORDER — HEPARIN SODIUM 10000 [USP'U]/100ML
5-30 INJECTION, SOLUTION INTRAVENOUS CONTINUOUS
Status: DISCONTINUED | OUTPATIENT
Start: 2022-07-30 | End: 2022-08-02

## 2022-07-30 RX ORDER — HEPARIN SODIUM 1000 [USP'U]/ML
4000 INJECTION, SOLUTION INTRAVENOUS; SUBCUTANEOUS PRN
Status: DISCONTINUED | OUTPATIENT
Start: 2022-07-30 | End: 2022-08-02 | Stop reason: ALTCHOICE

## 2022-07-30 RX ORDER — HEPARIN SODIUM 1000 [USP'U]/ML
2000 INJECTION, SOLUTION INTRAVENOUS; SUBCUTANEOUS PRN
Status: DISCONTINUED | OUTPATIENT
Start: 2022-07-30 | End: 2022-08-02 | Stop reason: ALTCHOICE

## 2022-07-30 RX ADMIN — LATANOPROST 1 DROP: 50 SOLUTION OPHTHALMIC at 08:50

## 2022-07-30 RX ADMIN — INSULIN LISPRO 1 UNITS: 100 INJECTION, SOLUTION INTRAVENOUS; SUBCUTANEOUS at 11:37

## 2022-07-30 RX ADMIN — MOMETASONE FUROATE AND FORMOTEROL FUMARATE DIHYDRATE 2 PUFF: 200; 5 AEROSOL RESPIRATORY (INHALATION) at 08:06

## 2022-07-30 RX ADMIN — HEPARIN SODIUM 4000 UNITS: 1000 INJECTION INTRAVENOUS; SUBCUTANEOUS at 11:27

## 2022-07-30 RX ADMIN — CARVEDILOL 25 MG: 25 TABLET, FILM COATED ORAL at 08:49

## 2022-07-30 RX ADMIN — HEPARIN SODIUM 4000 UNITS: 1000 INJECTION INTRAVENOUS; SUBCUTANEOUS at 18:19

## 2022-07-30 RX ADMIN — SODIUM CHLORIDE, PRESERVATIVE FREE 10 ML: 5 INJECTION INTRAVENOUS at 08:50

## 2022-07-30 RX ADMIN — HEPARIN SODIUM 8 UNITS/KG/HR: 10000 INJECTION, SOLUTION INTRAVENOUS at 11:28

## 2022-07-30 RX ADMIN — INSULIN LISPRO 1 UNITS: 100 INJECTION, SOLUTION INTRAVENOUS; SUBCUTANEOUS at 16:48

## 2022-07-30 RX ADMIN — FUROSEMIDE 20 MG: 20 TABLET ORAL at 08:49

## 2022-07-30 RX ADMIN — APIXABAN 5 MG: 5 TABLET, FILM COATED ORAL at 08:49

## 2022-07-30 RX ADMIN — CARVEDILOL 25 MG: 25 TABLET, FILM COATED ORAL at 16:48

## 2022-07-30 RX ADMIN — ALBUTEROL SULFATE 2 PUFF: 90 AEROSOL, METERED RESPIRATORY (INHALATION) at 19:35

## 2022-07-30 RX ADMIN — CALCITONIN SALMON 1 SPRAY: 200 SPRAY, METERED NASAL at 08:56

## 2022-07-30 RX ADMIN — MONTELUKAST 10 MG: 10 TABLET, FILM COATED ORAL at 20:39

## 2022-07-30 RX ADMIN — AMIODARONE HYDROCHLORIDE 200 MG: 200 TABLET ORAL at 20:39

## 2022-07-30 RX ADMIN — VALSARTAN 320 MG: 320 TABLET, FILM COATED ORAL at 08:49

## 2022-07-30 RX ADMIN — CLOPIDOGREL BISULFATE 75 MG: 75 TABLET ORAL at 08:49

## 2022-07-30 RX ADMIN — PERFLUTREN 0.45 ML: 6.52 INJECTION, SUSPENSION INTRAVENOUS at 15:32

## 2022-07-30 RX ADMIN — SODIUM CHLORIDE, PRESERVATIVE FREE 10 ML: 5 INJECTION INTRAVENOUS at 20:39

## 2022-07-30 RX ADMIN — MOMETASONE FUROATE AND FORMOTEROL FUMARATE DIHYDRATE 2 PUFF: 200; 5 AEROSOL RESPIRATORY (INHALATION) at 19:34

## 2022-07-30 RX ADMIN — FLUTICASONE PROPIONATE 2 SPRAY: 50 SPRAY, METERED NASAL at 08:50

## 2022-07-30 RX ADMIN — PIPERACILLIN AND TAZOBACTAM 4500 MG: 4; .5 INJECTION, POWDER, FOR SOLUTION INTRAVENOUS at 10:09

## 2022-07-30 RX ADMIN — AMIODARONE HYDROCHLORIDE 200 MG: 200 TABLET ORAL at 08:49

## 2022-07-30 NOTE — PROGRESS NOTES
VANCO DAILY FOLLOW UP NOTE  [facility] Pharmacokinetic Monitoring Service - Vancomycin    Consulting Provider: Dr. Vernon Dominguez   Indication: osteomyelitis  Target Concentration: Goal AUC/GIOVANNY 400-600 mg*hr/L  Day of Therapy: 2  Additional Antimicrobials: ceftriaxone    Pertinent Laboratory Values: Wt Readings from Last 1 Encounters:   07/27/22 260 lb (117.9 kg)     Temp Readings from Last 1 Encounters:   07/30/22 98.6 °F (37 °C) (Oral)     Recent Labs     07/27/22  2346 07/29/22  0718 07/30/22  0715   BUN 16 8 7*   CREATININE 1.00 0.60* 0.70*   WBC 10.3 8.8 7.4     Estimated Creatinine Clearance: 117 mL/min (A) (based on SCr of 0.7 mg/dL (L)). No results found for: America Hodgson    MRSA Nasal Swab: N/A. Non-respiratory infection. .      Assessment:  Date/Time Dose Concentration AUC         Note: Serum concentrations collected for AUC dosing may appear elevated if collected in close proximity to the dose administered, this is not necessarily an indication of toxicity    Plan:  Dosing recommendations based on Bayesian software  Continue vancomycin 1500 mg IV q18h  Anticipated AUC of 501 and trough concentration of 15.6 at steady state  Renal labs as indicated   Vancomycin concentration ordered for 7/31 @ 0400  Pharmacy will continue to monitor patient and adjust therapy as indicated    Thank you for the consult,  Amanda Bryant, [facility]

## 2022-07-30 NOTE — PROGRESS NOTES
During assessment, the nurse recognize pt leaking urine and asked pt if he aware that. Pt said he feels pressure and like something overflow. Nurse did bladder scan with 507ml. MD notified and order hoang for now. Hoang obtained draw out 615ml.

## 2022-07-30 NOTE — PROGRESS NOTES
Situation reviewed with developments with reference to discitis and epidural abscess  Substantial elevation in CRP documented yesterday's confirmatory  Management is antimicrobial agree with IR her to see with reference to culture  We will from a neurological standpoint sign off  This is going to be obviously a long-haul for the patient and I did speak to him today indicating that this would be the fact and there would not be a quick fix for the problem

## 2022-07-30 NOTE — CONSULTS
Infectious Disease Consult      Today's Date: 7/30/2022   Admit Date: 7/27/2022    Impression:   MRI findings of L2-L4 osteodiscitis and psoas abscess in setting of kyphoplasty 7/20  DM2, a1c 5.3  ICM with device in place  COPD on supplemental oxygen  History of Serratia septicemia (10/23/14) in setting of R groin wd infection; S/P Right common femoral artery exploration/ repair of the right common femoral artery/ and sartorious [id number]/5/14;   Operative tissue cx negative  10/23/14 Right groin swab grew Serratia (resistant to Cefazolin and Cefuroxime); & Morganella (resistant to  Ampicillin Cefazolin and Cefuroxime)  History of bilateral common femoral artery endarterectomy on 9/11/14 with persistent problems with non-healing of the right groin wound. Plan:   Get blood cultures x 2 today  Await IR aspirate then restart abx - ok to start vanc and ceftriaxone after aspirate  Anticipate 6 weeks of therapy based on imaging findings; regimen TBD    Anti-infectives:   Ceftriaxone 7/27-7/29  Vancomycin 7/29 x 1  Pip/tazo 7/30 x 1    Subjective:   Date of Consultation:  July 30, 2022  Date of Admission: 7/27/2022   Referring Provider: Mehdi Kim    Patient is a 65 y.o. male with PMH of ICM with PM (EF 30-35%), DM2, recent L2-L4 kyphoplasty 7/20 who presented with back pain and LE weakness. Had several falls following the surgery and worsening weakness so recommended he present for evaluation and MRI. He has otherwise been afebrile, no white count, no report of surgical site issues. He was started on ceftriaxone for his urine, unclear as to why as there were no symptoms and urine was not consistent with infection. Unfortunately MRI was not done until yesterday due to his PM, findings of discitis/OM from L2-L4 with phelgmon and small L psoas abscess. IR has been consulted for aspirate of the area, right now surgery note indicates hoping to hold on further surgical intervention and treat medically.  No blood cultures obtained. Patient Active Problem List   Diagnosis    H/O endarterectomy    Arterial degeneration    Atrial fibrillation (HCC)    Complicated wound infection    Allergic rhinitis    Elevated troponin    HTN (hypertension)    Pulmonary emphysema (HCC)    Atherosclerosis of native arteries of extremity with intermittent claudication (HCC)    Severe obesity (BMI 35.0-39. 9) with comorbidity (HCC)    COPD (chronic obstructive pulmonary disease) (HCC)    Personal history of tobacco use, presenting hazards to health    Intermittent spinal claudication (HCC)    Cigarette nicotine dependence in remission    Irregular heart beat    Acute metabolic encephalopathy    Chest pain    Hiatal hernia    Diabetic neuropathy (HCC)    Normocytic anemia    Chronic pain of right knee    Sleep apnea    Osteoarthritis    Encounter for long-term (current) drug use    Ischemic cardiomyopathy    Ventricular tachycardia (HCC)    VT (ventricular tachycardia) (HCC)    Coronary artery disease involving native coronary artery of native heart without angina pectoris    Benign neoplasm of colon    PAD (peripheral artery disease) (HCC)    Asthma    Orthostatic hypotension    Hyperlipidemia    S/P angioplasty with stent    DM (diabetes mellitus) type II, controlled, with peripheral vascular disorder (HCC)    Toe laceration    Obstructive sleep apnea    MINI (acute kidney injury) ([address]    Type 2 diabetes with nephropathy (HCC)    Hypoxia    Abnormal nuclear cardiac imaging test    PVC's (premature ventricular contractions)    Asbestos exposure    Sepsis ([address]    ICD (implantable cardioverter-defibrillator) in place    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, vertebra(e), init ([address]    General weakness    Acute cystitis without hematuria    Low back pain without sciatica    Back pain    DNI (do not intubate)    UTI (urinary tract infection)    Elevated liver enzymes    Anemia    Hyponatremia     Past Medical History:   Diagnosis Date    Acute respiratory failure with hypoxia ([address] 10/23/2014    MINI (acute kidney injury) ([address] 09/15/2014    MINI (acute kidney injury) ([address]     MINI (acute kidney injury) ([address]     Allergic rhinitis 11/10/2015    Asthma 11/10/2015    Benign essential hypertension 11/10/2015    Benign neoplasm of colon 11/10/2015    CAD (coronary artery disease) 11/10/2015    CAD (coronary artery disease) 1998, 1999    mix2, 3 stents [id number]    CAP (community acquired pneumonia) 12/31/2020    Cardiomyopathy ([address] 54/98/6754    Complicated wound infection 11/10/2015    COPD (chronic obstructive pulmonary disease) with emphysema ([address] 11/10/2015    COPD exacerbation ([address] 10/12/2011    COVID-19 12/31/2020    Diabetes mellitus type 2, controlled ([address] 11/10/2015    Diabetic neuropathy ([address] 11/10/2015    Disruption of wound, unspecified 10/09/2014    Encounter for long-term (current) use of other high-risk medications 11/10/2015    Extremity atherosclerosis with intermittent claudication ([address] 11/10/2015    H/O endarterectomy 11/10/2015    Hiatal hernia 11/10/2015    History of 2019 novel coronavirus disease (COVID-19)     12/31/2020-    Hyperglycemia due to type 1 diabetes mellitus ([address] 11/10/2015    Hyperlipidemia 11/10/2015    ICD (implantable cardioverter-defibrillator) in place 06/16/2022    Monomorphic ventricular tachycardia ([address] 12/31/2020    Myocardial infarction ([address] 1999    Myocardial infarction ([address] 11/10/2015    Obesity 11/10/2015    Orthostatic hypotension 11/10/2015    Osteoarthritis 11/10/2015    Peripheral vascular disease ([address] 11/10/2015    PNA (pneumonia) 02/08/2019    PVC (premature ventricular contraction)     Rash 01/10/4317    Seroma complicating a procedure 10/02/2014    Sleep apnea     cpap    Toe laceration     Type 2 diabetes with nephropathy ([address]     Uncontrolled type 2 diabetes mellitus ([address] 11/10/2015    Vertiginous syndromes and other disorders of vestibular system 11/10/2015      Family History   Problem Relation Age of Onset    Breast Cancer Mother     Hypertension Mother     Other Father         DIVERTICULITIS    Crohn's Disease Sister     Lung Disease Brother         mesothioloma    Diabetes Sister     Heart Attack Father     Heart Disease Father     Stroke Mother       Social History     Tobacco Use    Smoking status: Former     Packs/day: 1.00     Types: Cigarettes     Quit date: 10/2/2011     Years since quitting: 10.8    Smokeless tobacco: Current   Substance Use Topics    Alcohol use: Yes     Alcohol/week: 0.0 standard drinks     Past Surgical History:   Procedure Laterality Date    CARDIAC CATHETERIZATION  12/05/2018    LV EF=40%. LM:nl. LAD:30-40% mid stenosis. LCX OM1:95% stenosis. RCA:100% occlusion at RV branch. CORONARY ANGIOPLASTY WITH STENT PLACEMENT  12/05/2018    LCX OM1:2.5 x 12 [place] RAKESH    CORONARY ANGIOPLASTY WITH STENT PLACEMENT  1999    [facility] ABDOMEN SURGERY PROC UNLISTED  1960    abdominal cyst removed    [facility] CARDIAC SURG PROCEDURE UNLIST  1999    stents x3    SPINE SURGERY N/A 7/19/2022    LUMBAR 2, LUMBAR 4 KYPHOPLASTY AND ANY OTHER INDICATED LEVELS performed by Joseph Augustin MD at [address] [place] [address]    VASCULAR SURGERY  11/5/14    Repair of right common femoral artery     VASCULAR SURGERY  9/11/14    tiffanie femoral endarterectomy      Prior to Admission medications    Medication Sig Start Date End Date Taking?  Authorizing Provider   fluticasone-salmeterol (ADVAIR) 250-50 MCG/ACT AEPB diskus inhaler Inhale 1 puff into the lungs in the morning and at bedtime 6/30/22  Yes Historical Provider, MD   calcitonin (MIACALCIN) 200 UNIT/ACT nasal spray 1 spray by Nasal route daily 6/3/22  Yes AARON Sunshine CNP   albuterol sulfate  (90 Base) MCG/ACT inhaler USE 2 PUFFS BY INHALATION 4 TIMES DAILY AS NEEDED FOR WHEEZING OR SHORTNESS OF BREATH. 1/14/22  Yes Ar Automatic Reconciliation   amiodarone (CORDARONE) 200 MG tablet Take 200 mg by mouth 2 times daily 3/10/22  Yes Ar Automatic Marely Ridley MD   HYDROcodone-acetaminophen (NORCO) 7.5-325 MG per tablet Take 1 tablet by mouth every 6 hours as needed for Pain for up to 7 days. Intended supply: 7 days. Take lowest dose possible to manage pain 7/27/22 8/3/22  S. Minus Ernesto NEWMAN MD   tiZANidine (ZANAFLEX) 2 MG tablet Take 1 tablet by mouth every 8 hours as needed (muscle spasms) 22   Saddie Cowden, MD   nitroGLYCERIN (NITROSTAT) 0.4 MG SL tablet Place 0.4 mg under the tongue 3/11/22   Ar Automatic Reconciliation   polyethylene glycol (GLYCOLAX) 17 GM/SCOOP powder Take 17 g by mouth daily 21   Ar Automatic Reconciliation     Allergies   Allergen Reactions    Azithromycin Hives    Oxaprozin Other (See Comments)     ELEVATED BLOOD PRESSURE        Review of Systems:  All systems reviewed and negative except as otherwise stated in the HPI    Objective:   Blood pressure (!) 147/65, pulse 65, temperature 98.6 °F (37 °C), temperature source Oral, resp. rate 16, height 6' 1\" (1.854 m), weight 260 lb (117.9 kg), SpO2 94 %. Visit Vitals  BP (!) 147/65   Pulse 65   Temp 98.6 °F (37 °C) (Oral)   Resp 16   Ht 6' 1\" (1.854 m)   Wt 260 lb (117.9 kg)   SpO2 94%   BMI 34.30 kg/m²     Temp (24hrs), Av.2 °F (36.8 °C), Min:97.6 °F (36.4 °C), Max:98.6 °F (37 °C)       Exam:    General:  Alert, cooperative, well noursished, well developed, appears stated age   Eyes:  Sclera anicteric. Pupils equally round and reactive to light. Mouth/Throat: Mucous membranes normal, oral pharynx clear   Neck: Supple   Lungs:   Clear to auscultation bilaterally, good effort   CV:  Regular rate and rhythm,no murmur, click, rub or gallop   Abdomen:   Soft, non-tender.  bowel sounds normal. non-distended   Extremities: No cyanosis or edema   Skin: Skin color, texture, turgor normal. no acute rash or lesions   Lymph nodes: Cervical and supraclavicular normal   Musculoskeletal: No swelling or deformity   Lines/Devices:  Intact, no erythema, drainage or tenderness   Psych: Alert and oriented, normal mood affect given the setting       CBC:  Recent Labs     07/27/22  2346 07/29/22  0718 07/30/22  0715   WBC 10.3 8.8 7.4   HGB 10.2* 9.5* 9.8*   HCT 31.1* 28.9* 29.4*    278 265       BMP:  Recent Labs     07/27/22  2346 07/29/22  0718 07/30/22  0715   BUN 16 8 7*   * 134* 132*   K 3.2* 3.3* 3.3*   CL 97* 95* 96*   CO2 29 32 31       LFTS:  Recent Labs     07/27/22  1917   ALT 94*       Data Review:   Recent Results (from the past 24 hour(s))   POCT Glucose    Collection Time: 07/29/22 12:17 PM   Result Value Ref Range    POC Glucose 196 (H) 65 - 100 mg/dL    Performed by: [place]    POCT Glucose    Collection Time: 07/29/22  4:16 PM   Result Value Ref Range    POC Glucose 177 (H) 65 - 100 mg/dL    Performed by: Roxana Gallo.     POCT Glucose    Collection Time: 07/29/22  9:04 PM   Result Value Ref Range    POC Glucose 207 (H) 65 - 100 mg/dL    Performed by: Analia Obrien [facility] PPD TEST IN 48 HRS    Collection Time: 07/29/22 11:39 PM   Result Value Ref Range    PPD, (POC) Negative Negative    mm Induration 0 0 - 5 mm   CBC with Auto Differential    Collection Time: 07/30/22  7:15 AM   Result Value Ref Range    WBC 7.4 4.3 - 11.1 K/uL    RBC 3.06 (L) 4.23 - 5.6 M/uL    Hemoglobin 9.8 (L) 13.6 - 17.2 g/dL    Hematocrit 29.4 (L) 41.1 - 50.3 %    MCV 96.1 79.6 - 97.8 FL    MCH 32.0 26.1 - 32.9 PG    MCHC 33.3 31.4 - 35.0 g/dL    RDW 14.5 11.9 - 14.6 %    Platelets 822 903 - 497 K/uL    MPV 9.9 9.4 - 12.3 FL    nRBC 0.00 0.0 - 0.2 K/uL    Differential Type AUTOMATED      Seg Neutrophils 82 (H) 43 - 78 %    Lymphocytes 9 (L) 13 - 44 %    Monocytes 8 4.0 - 12.0 %    Eosinophils % 0 (L) 0.5 - 7.8 %    Basophils 0 0.0 - 2.0 %    Immature Granulocytes 1 0.0 - 5.0 %    Segs Absolute 6.0 1.7 - 8.2 K/UL    Absolute Lymph # 0.7 0.5 - 4.6 K/UL    Absolute Mono # 0.6 0.1 - 1.3 K/UL    Absolute Eos # 0.0 0.0 - 0.8 K/UL    Basophils Absolute 0.0 0.0 - 0.2 K/UL Absolute Immature Granulocyte 0.1 0.0 - 0.5 K/UL   Basic Metabolic Panel w/ Reflex to MG    Collection Time: 07/30/22  7:15 AM   Result Value Ref Range    Sodium 132 (L) 138 - 145 mmol/L    Potassium 3.3 (L) 3.5 - 5.1 mmol/L    Chloride 96 (L) 98 - 107 mmol/L    CO2 31 21 - 32 mmol/L    Anion Gap 5 (L) 7 - 16 mmol/L    Glucose 160 (H) 65 - 100 mg/dL    BUN 7 (L) 8 - 23 MG/DL    Creatinine 0.70 (L) 0.8 - 1.5 MG/DL    GFR African American >60 >60 ml/min/1.73m2    GFR Non- >60 >60 ml/min/1.73m2    Calcium 8.7 8.3 - 10.4 MG/DL   Phosphorus    Collection Time: 07/30/22  7:15 AM   Result Value Ref Range    Phosphorus 2.6 2.3 - 3.7 MG/DL   Magnesium    Collection Time: 07/30/22  7:15 AM   Result Value Ref Range    Magnesium 1.8 1.8 - 2.4 mg/dL   POCT Glucose    Collection Time: 07/30/22  7:46 AM   Result Value Ref Range    POC Glucose 162 (H) 65 - 100 mg/dL    Performed by:  Jes    APTT    Collection Time: 07/30/22  9:53 AM   Result Value Ref Range    PTT 36.6 (H) 24.1 - 35.1 SEC   Protime-INR    Collection Time: 07/30/22  9:53 AM   Result Value Ref Range    Protime 16.3 (H) 12.6 - 14.5 sec    INR 1.3          Microbiology:  Reviewed    Studies:  Reviewed    Signed By: Maria G Palacios MD     July 30, 2022

## 2022-07-30 NOTE — PROGRESS NOTES
Hospitalist Progress Note   Admit Date:  2022  3:41 PM   Name:  Zenia Alvarenga   Age:  65 y.o. Sex:  male  :  1945   MRN:  [id number]   Room:  [facility]/      Reason(s) for Admission: Back pain [M54.9]  General weakness [R53.1]  Acute cystitis without hematuria [N30.00]  Low back pain without sciatica, unspecified back pain laterality, unspecified chronicity [M54.50]     Hospital Course & Interval History:   Zenia Alvarenga is a 65 y.o. male with medical history of ICM with ICD/PPM, EF 30-35% afib on eliquis, DM2, obesity, PAD, anemia, COPD/ asthma, CAD, SHERI on CPAP with recent L2-4 kyphoplasty evaluated with diffuse  bilateral LE weakness. He states this has been worse since his kyphoplasty and he has had several falls. Lives with wife Blanche Blancas. Seen at ortho spine today and ordered MRI Lspine to evaluate ongoing pain and weakness. Did have negative bone biopsy with kyphoplasty. Felt worse and not better post procedure. Unable to ambulate well, using walker, has trouble getting up from seated position. No LE paresthesia and no urine issues excluding some issues starting stream. Had some loose BM and can't make it to the bathroom in time. No fever. No dysuria. Urine is darker. Has ICD/PPM- will need clearance for MRI. CT Lspine shows \"  Postoperative changes from kyphoplasty procedure at L2 and L4. No lumbar spine   fracture or malalignment aside from the compression deformities. Multiple levels   of probable spinal canal stenosis as described on recent MRI. \"     UA positive. CXR with improved infiltrates. COVID negative. Wife Blanche Blancas [phone], or [phone]     He is a DNI       Subjective/24hr Events (22): Patient is seen and examined at the bedside. He is being on the bed and saturating well on room air. He is hard of hearing. He is complaining of severe back pain. He had urinary retention 500ml  yesterday and hoang was placed.  Patient denies chest pain, shortness of breath, nausea, vomiting, diarrhea. Assessment & Plan:     Back pain  MRI brain no acute infarct   MRI lumbar and cervical spine showed epidural, psoas abscess and osteomyelitis. CRP is elevated 12.4  Ortho following  Neurology signed off  PT,OT recommended STR     Newly diagnosed Epidural and psoas abscess  IR was consulted for the aspirating the abscess  Obtain blood cultures today  Plan to start antibiotics after aspirating the abscess  Eliquis and Plavix is discontinued for the anticipation of procedures  ID is consulted     Acute Osteomyelitis  MRI showed osteomyelitis  Anticipate 6 weeks of therapy      Acute cystitis without hematuria  Rocephin is stopped    urine culture showing  more than 50k gram-negative rods and awaiting sensitivities          DNI (do not intubate)  Noted and discussed       Elevated liver enzymes  Trend lab      Anemia  HGB 10.2, stable   Stable, chronic range for him  Trend HGB       Hyponatremia  Resolved     Ischemic cardiomyopathy  Plan:    Ventricular tachycardia ([address]  Plan:    Coronary artery disease involving native coronary artery of native heart without angina pectoris  Plan:    PAD (peripheral artery disease) ([address]  Plan: Active Problems:    ICD (implantable cardioverter-defibrillator) in place  Plan:     Atrial fibrillation ([address]  Plan:   Eliquis and Plavix is discontinued for the anticipation of procedures  Continue  amiodarone, statin      Severe obesity (BMI 35.0-39. 9) with comorbidity ([address]  Plan:   Needs modification       COPD (chronic obstructive pulmonary disease) ([facility])  Plan:   Resume inhalers       DM (diabetes mellitus) type II, controlled, with peripheral vascular disorder ([address]  Plan:   SSI       Obstructive sleep apnea  Plan:   Resume CPAP       Dispo/Discharge Planning: Anticipating hospital stay for 3 to 5 days     Diet: diabetic  VTE ppx: eliquis  Code status: DNI     Diet:  ADULT DIET; Regular; 3 carb choices (45 gm/meal);  Low Fat/Low Chol/High Fiber/ANGELA  ADULT ORAL NUTRITION SUPPLEMENT; Breakfast, Lunch, Dinner; Diabetic Oral Supplement  DVT PPx  Code status: Limited    Hospital Problems             Last Modified POA    * (Principal) Back pain 7/27/2022 Yes    ICD (implantable cardioverter-defibrillator) in place 7/27/2022 Yes    General weakness 7/27/2022 Yes    Acute cystitis without hematuria 7/27/2022 Yes    Low back pain without sciatica 7/27/2022 Yes    DNI (do not intubate) (Chronic) 7/27/2022 Yes    UTI (urinary tract infection) 7/27/2022 Yes    Elevated liver enzymes 7/27/2022 Yes    Anemia (Chronic) 7/27/2022 Yes    Hyponatremia 7/27/2022 Yes    Atrial fibrillation ([address] 7/27/2022 Yes    Severe obesity (BMI 35.0-39. 9) with comorbidity ([address] 7/27/2022 Yes    COPD (chronic obstructive pulmonary disease) ([address] 7/27/2022 Yes    Intermittent spinal claudication ([address] 7/27/2022 Yes    Ischemic cardiomyopathy 7/27/2022 Yes    Ventricular tachycardia ([address] 7/27/2022 Yes    Coronary artery disease involving native coronary artery of native heart without angina pectoris 7/27/2022 Yes    PAD (peripheral artery disease) ([address] 7/27/2022 Yes    Asthma 7/27/2022 Yes    DM (diabetes mellitus) type II, controlled, with peripheral vascular disorder ([address] 7/27/2022 Yes    Obstructive sleep apnea 7/27/2022 Yes       Objective:   Patient Vitals for the past 24 hrs:   Temp Pulse Resp BP SpO2   07/30/22 1117 98 °F (36.7 °C) 63 20 138/63 90 %   07/30/22 0806 -- 65 16 -- 94 %   07/30/22 0707 98.6 °F (37 °C) 65 20 (!) 147/65 93 %   07/30/22 0306 98.2 °F (36.8 °C) 59 18 139/70 93 %   07/29/22 2309 98.4 °F (36.9 °C) 60 19 (!) 152/58 94 %   07/29/22 1946 98.3 °F (36.8 °C) 67 24 134/60 94 %   07/29/22 1916 -- -- -- -- 93 %   07/29/22 1455 97.6 °F (36.4 °C) 70 22 136/61 91 %       Estimated body mass index is 34.3 kg/m² as calculated from the following:    Height as of this encounter: 6' 1\" (1.854 m). Weight as of 7/28/22: 260 lb (117.9 kg).     Intake/Output Summary (Last 24 hours) at 7/30/2022 1428  Last data filed at 7/30/2022 1348  Gross per 24 hour   Intake 1800 ml   Output 1200 ml   Net 600 ml         Physical Exam:     Blood pressure 138/63, pulse 63, temperature 98 °F (36.7 °C), temperature source Oral, resp. rate 20, height 6' 1\" (1.854 m), weight 260 lb (117.9 kg), SpO2 90 %. General:    Well nourished. No overt distress, morbidly obese  Head:  Normocephalic, atraumatic  Eyes:  Sclerae appear normal. Pupils equally round. ENT:  Nares appear normal, no drainage. Moist oral mucosa  Neck:  No restricted ROM. Trachea midline. CV:   RRR. No m/r/g. No jugular venous distension. Lungs:   CTAB. No wheezing, rhonchi, or rales. Respirations even, unlabored. Abdomen: Bowel sounds present. Soft, nontender, nondistended. Extremities: No cyanosis or clubbing. No edema. Tenderness of the back is present. Skin:     No rashes and normal coloration. Warm and dry. Neuro:  CN II-XII grossly intact. Sensation intact. A&Ox3  Psych:  Normal mood and affect.       I have reviewed ordered lab tests and independently visualized imaging below:    Recent Labs:  Recent Results (from the past 48 hour(s))   POCT Glucose    Collection Time: 07/28/22  4:43 PM   Result Value Ref Range    POC Glucose 225 (H) 65 - 100 mg/dL    Performed by: [id number]    POCT Glucose    Collection Time: 07/28/22  9:15 PM   Result Value Ref Range    POC Glucose 164 (H) 65 - 100 mg/dL    Performed by: Ollie Mcdermott PPD TEST IN 24 HRS    Collection Time: 07/28/22 11:28 PM   Result Value Ref Range    PPD, (POC) Negative Negative    mm Induration 0 0 - 5 mm   CBC with Auto Differential    Collection Time: 07/29/22  7:18 AM   Result Value Ref Range    WBC 8.8 4.3 - 11.1 K/uL    RBC 3.00 (L) 4.23 - 5.6 M/uL    Hemoglobin 9.5 (L) 13.6 - 17.2 g/dL    Hematocrit 28.9 (L) 41.1 - 50.3 %    MCV 96.3 79.6 - 97.8 FL    MCH 31.7 26.1 - 32.9 PG    MCHC 32.9 31.4 - 35.0 g/dL    RDW 14.4 11.9 - 14.6 %    Platelets 747 333 - 115 K/uL    MPV 10.0 9.4 - 12.3 FL    nRBC 0.00 0.0 - 0.2 K/uL    Differential Type AUTOMATED      Seg Neutrophils 82 (H) 43 - 78 %    Lymphocytes 6 (L) 13 - 44 %    Monocytes 9 4.0 - 12.0 %    Eosinophils % 0 (L) 0.5 - 7.8 %    Basophils 1 0.0 - 2.0 %    Immature Granulocytes 2 0.0 - 5.0 %    Segs Absolute 7.3 1.7 - 8.2 K/UL    Absolute Lymph # 0.5 0.5 - 4.6 K/UL    Absolute Mono # 0.8 0.1 - 1.3 K/UL    Absolute Eos # 0.0 0.0 - 0.8 K/UL    Basophils Absolute 0.1 0.0 - 0.2 K/UL    Absolute Immature Granulocyte 0.1 0.0 - 0.5 K/UL   Basic Metabolic Panel w/ Reflex to MG    Collection Time: 07/29/22  7:18 AM   Result Value Ref Range    Sodium 134 (L) 136 - 145 mmol/L    Potassium 3.3 (L) 3.5 - 5.1 mmol/L    Chloride 95 (L) 98 - 107 mmol/L    CO2 32 21 - 32 mmol/L    Anion Gap 7 7 - 16 mmol/L    Glucose 161 (H) 65 - 100 mg/dL    BUN 8 8 - 23 MG/DL    Creatinine 0.60 (L) 0.8 - 1.5 MG/DL    GFR African American >60 >60 ml/min/1.73m2    GFR Non- >60 >60 ml/min/1.73m2    Calcium 8.4 8.3 - 10.4 MG/DL   Phosphorus    Collection Time: 07/29/22  7:18 AM   Result Value Ref Range    Phosphorus 2.6 2.3 - 3.7 MG/DL   Hepatitis Panel, Acute    Collection Time: 07/29/22  7:18 AM   Result Value Ref Range    Hep A IgM NONREACTIVE NR      Hep B Core Ab, IgM NONREACTIVE NR      Hepatitis B Surface Ag NONREACTIVE NR      Hepatitis C Ab NONREACTIVE NR     Magnesium    Collection Time: 07/29/22  7:18 AM   Result Value Ref Range    Magnesium 2.0 1.8 - 2.4 mg/dL   C-Reactive Protein    Collection Time: 07/29/22  7:18 AM   Result Value Ref Range    CRP 12.4 (H) 0.0 - 0.9 mg/dL   POCT Glucose    Collection Time: 07/29/22  8:08 AM   Result Value Ref Range    POC Glucose 145 (H) 65 - 100 mg/dL    Performed by: Radha    POCT Glucose    Collection Time: 07/29/22 12:17 PM   Result Value Ref Range    POC Glucose 196 (H) 65 - 100 mg/dL    Performed by: Roya Green POCT Glucose    Collection Time: 07/29/22  4:16 PM   Result Value Ref Range    POC Glucose 177 (H) 65 - 100 mg/dL    Performed by: Peggy An.     POCT Glucose    Collection Time: 07/29/22  9:04 PM   Result Value Ref Range    POC Glucose 207 (H) 65 - 100 mg/dL    Performed by: Paula Green PPD TEST IN 48 HRS    Collection Time: 07/29/22 11:39 PM   Result Value Ref Range    PPD, (POC) Negative Negative    mm Induration 0 0 - 5 mm   CBC with Auto Differential    Collection Time: 07/30/22  7:15 AM   Result Value Ref Range    WBC 7.4 4.3 - 11.1 K/uL    RBC 3.06 (L) 4.23 - 5.6 M/uL    Hemoglobin 9.8 (L) 13.6 - 17.2 g/dL    Hematocrit 29.4 (L) 41.1 - 50.3 %    MCV 96.1 79.6 - 97.8 FL    MCH 32.0 26.1 - 32.9 PG    MCHC 33.3 31.4 - 35.0 g/dL    RDW 14.5 11.9 - 14.6 %    Platelets 940 162 - 706 K/uL    MPV 9.9 9.4 - 12.3 FL    nRBC 0.00 0.0 - 0.2 K/uL    Differential Type AUTOMATED      Seg Neutrophils 82 (H) 43 - 78 %    Lymphocytes 9 (L) 13 - 44 %    Monocytes 8 4.0 - 12.0 %    Eosinophils % 0 (L) 0.5 - 7.8 %    Basophils 0 0.0 - 2.0 %    Immature Granulocytes 1 0.0 - 5.0 %    Segs Absolute 6.0 1.7 - 8.2 K/UL    Absolute Lymph # 0.7 0.5 - 4.6 K/UL    Absolute Mono # 0.6 0.1 - 1.3 K/UL    Absolute Eos # 0.0 0.0 - 0.8 K/UL    Basophils Absolute 0.0 0.0 - 0.2 K/UL    Absolute Immature Granulocyte 0.1 0.0 - 0.5 K/UL   Basic Metabolic Panel w/ Reflex to MG    Collection Time: 07/30/22  7:15 AM   Result Value Ref Range    Sodium 132 (L) 138 - 145 mmol/L    Potassium 3.3 (L) 3.5 - 5.1 mmol/L    Chloride 96 (L) 98 - 107 mmol/L    CO2 31 21 - 32 mmol/L    Anion Gap 5 (L) 7 - 16 mmol/L    Glucose 160 (H) 65 - 100 mg/dL    BUN 7 (L) 8 - 23 MG/DL    Creatinine 0.70 (L) 0.8 - 1.5 MG/DL    GFR African American >60 >60 ml/min/1.73m2    GFR Non- >60 >60 ml/min/1.73m2    Calcium 8.7 8.3 - 10.4 MG/DL   Phosphorus    Collection Time: 07/30/22  7:15 AM   Result Value Ref Range    Phosphorus 2.6 2.3 - 3.7 MG/DL   Magnesium    Collection Time: 07/30/22  7:15 AM   Result Value Ref Range    Magnesium 1.8 1.8 - 2.4 mg/dL   POCT Glucose    Collection Time: 07/30/22  7:46 AM   Result Value Ref Range    POC Glucose 162 (H) 65 - 100 mg/dL    Performed by: Jes    APTT    Collection Time: 07/30/22  9:53 AM   Result Value Ref Range    PTT 36.6 (H) 24.1 - 35.1 SEC   Protime-INR    Collection Time: 07/30/22  9:53 AM   Result Value Ref Range    Protime 16.3 (H) 12.6 - 14.5 sec    INR 1.3     POCT Glucose    Collection Time: 07/30/22 11:36 AM   Result Value Ref Range    POC Glucose 214 (H) 65 - 100 mg/dL    Performed by: Annika Blue          Other Studies:  XR LUMBAR SPINE (2-3 VIEWS)    Result Date: 7/27/2022  AUTOMATIC ADMINISTRATIVE RESULT The result for this exam can be found in the Progress note in the chart. See Progress note in the chart. CT LUMBAR SPINE WO CONTRAST    Result Date: 7/27/2022  EXAMINATION: CT LUMBAR SPINE WO CONTRAST 7/27/2022 6:21 PM ACCESSION NUMBER: [id number] COMPARISON: MRI lumbar spine 06/02/2022. INDICATION: Recent kyphoplasty with fall. TECHNIQUE: Multiple-row detector helical CT examination of the lumbar spine was performed without intravenous contrast. Multiplanar reformats were provided. Radiation dose reduction techniques were used for this study. Our CT scanners use one or all of the following: Automated exposure control, adjustment of the mA and/or kV according to patient size, iterative reconstruction. FINDINGS: Patient is status post interval kyphoplasty procedure at L2 and L4 when compared to prior MRI. Mild compression fractures are noted at these levels. No retropulsed fragments noted in the spinal canal. Multilevel degenerative disc changes are present. No malalignment. Areas of spinal canal stenosis suspected as described on recent prior MRI. Postoperative changes from kyphoplasty procedure at L2 and L4.  No lumbar spine fracture or malalignment aside from the compression deformities. Multiple levels of probable spinal canal stenosis as described on recent MRI. XR CHEST PORTABLE    Result Date: 7/27/2022  XR CHEST PORTABLE 7/27/2022 6:35 PM HISTORY: Weakness. COMPARISON: Chest x-ray 3/7/2022. A portable AP view of the chest was obtained. Pulmonary infiltrate predominantly in the right upper lobe and mid to lower left lung has mostly cleared. Atelectasis or scarring right lung base appears stable. Stable cardiomegaly. Implantable cardiac device left chest and both leads are unchanged. No pneumothorax. No pleural effusions.        Current Meds:  Current Facility-Administered Medications   Medication Dose Route Frequency    heparin (porcine) injection 4,000 Units  4,000 Units IntraVENous PRN    heparin (porcine) injection 2,000 Units  2,000 Units IntraVENous PRN    heparin 25,000 units in dextrose 5% 250 mL (premix) infusion  5-30 Units/kg/hr IntraVENous Continuous    albuterol sulfate HFA (PROVENTIL;VENTOLIN;PROAIR) 108 (90 Base) MCG/ACT inhaler 2 puff  2 puff Inhalation Q4H PRN    amiodarone (CORDARONE) tablet 200 mg  200 mg Oral BID    [Held by provider] apixaban (ELIQUIS) tablet 5 mg  5 mg Oral 2 times per day    calcitonin (MIACALCIN) nasal spray 1 spray  1 spray Alternating Nares Daily    carvedilol (COREG) tablet 25 mg  25 mg Oral BID WC    [Held by provider] clopidogrel (PLAVIX) tablet 75 mg  75 mg Oral Daily    fluticasone (FLONASE) 50 MCG/ACT nasal spray 2 spray  2 spray Each Nostril Daily    mometasone-formoterol (DULERA) 200-5 MCG/ACT inhaler 2 puff  2 puff Inhalation BID    furosemide (LASIX) tablet 20 mg  20 mg Oral Daily    latanoprost (XALATAN) 0.005 % ophthalmic solution 1 drop  1 drop Ophthalmic Daily    montelukast (SINGULAIR) tablet 10 mg  10 mg Oral Nightly    [Held by provider] rosuvastatin (CRESTOR) tablet 10 mg  10 mg Oral Daily    valsartan (DIOVAN) tablet 320 mg  320 mg Oral Daily    sodium chloride flush 0.9 % injection 5-40 mL 5-40 mL IntraVENous 2 times per day    sodium chloride flush 0.9 % injection 5-40 mL  5-40 mL IntraVENous PRN    0.9 % sodium chloride infusion   IntraVENous PRN    ondansetron (ZOFRAN-ODT) disintegrating tablet 4 mg  4 mg Oral Q8H PRN    Or    ondansetron (ZOFRAN) injection 4 mg  4 mg IntraVENous Q6H PRN    polyethylene glycol (GLYCOLAX) packet 17 g  17 g Oral Daily PRN    acetaminophen (TYLENOL) tablet 650 mg  650 mg Oral Q6H PRN    Or    acetaminophen (TYLENOL) suppository 650 mg  650 mg Rectal Q6H PRN    glucose chewable tablet 16 g  4 tablet Oral PRN    dextrose bolus 10% 125 mL  125 mL IntraVENous PRN    Or    dextrose bolus 10% 250 mL  250 mL IntraVENous PRN    glucagon (rDNA) injection 1 mg  1 mg SubCUTAneous PRN    dextrose 10 % infusion   IntraVENous Continuous PRN    insulin lispro (HUMALOG) injection vial 0-4 Units  0-4 Units SubCUTAneous TID WC    insulin lispro (HUMALOG) injection vial 0-4 Units  0-4 Units SubCUTAneous Nightly     Facility-Administered Medications Ordered in Other Encounters   Medication Dose Route Frequency    gadoteridol (PROHANCE) injection 23 mL  23 mL IntraVENous ONCE PRN    sodium chloride flush 0.9 % injection 10 mL  10 mL IntraVENous PRN       Signed:  Shawna Le MD    Part of this note may have been written by using a voice dictation software. The note has been proof read but may still contain some grammatical/other typographical errors.

## 2022-07-30 NOTE — PROGRESS NOTES
2022         Post Op day: * No surgery found *     Admit Date: 2022  Admit Diagnosis: Back pain [M54.9]  General weakness [R53.1]  Acute cystitis without hematuria [N30.00]  Low back pain without sciatica, unspecified back pain laterality, unspecified chronicity [M54.50]  Lumbar 2, Lumbar 4 Kyphoplasty And Any Other Indicated Levels  2022    Subjective: Doing well, States he is ready to get back up and on his feet. No new complaints.   On high flow oxygen  PT/OT:                               BMP:  Recent Labs     22  2346 2218 22  0715   BUN 16 8 7*   * 134* 132*   K 3.2* 3.3* 3.3*   CL 97* 95* 96*   CO2 29 32 31     Patient Vitals for the past 8 hrs:   BP Temp Temp src Pulse Resp SpO2   22 0806 -- -- -- 65 16 94 %   22 0707 (!) 147/65 98.6 °F (37 °C) Oral 65 20 93 %   22 0306 139/70 98.2 °F (36.8 °C) Oral 59 18 93 %     Temp (24hrs), Av.2 °F (36.8 °C), Min:97.6 °F (36.4 °C), Max:98.6 °F (37 °C)    CBC:  Recent Labs     22  2346 2218 22  0715   WBC 10.3 8.8 7.4   HGB 10.2* 9.5* 9.8*   HCT 31.1* 28.9* 29.4*    278 265       Microbiology:     Recent Results (from the past 72 hour(s))   CBC with Auto Differential    Collection Time: 22  7:17 PM   Result Value Ref Range    WBC 10.8 4.3 - 11.1 K/uL    RBC 3.28 (L) 4.23 - 5.6 M/uL    Hemoglobin 10.6 (L) 13.6 - 17.2 g/dL    Hematocrit 31.6 (L) 41.1 - 50.3 %    MCV 96.3 79.6 - 97.8 FL    MCH 32.3 26.1 - 32.9 PG    MCHC 33.5 31.4 - 35.0 g/dL    RDW 14.8 (H) 11.9 - 14.6 %    Platelets 043 888 - 939 K/uL    MPV 10.9 9.4 - 12.3 FL    nRBC 0.00 0.0 - 0.2 K/uL    Differential Type AUTOMATED      Seg Neutrophils 80 (H) 43 - 78 %    Lymphocytes 8 (L) 13 - 44 %    Monocytes 10 4.0 - 12.0 %    Eosinophils % 0 (L) 0.5 - 7.8 %    Basophils 1 0.0 - 2.0 %    Immature Granulocytes 2 0.0 - 5.0 %    Segs Absolute 8.6 (H) 1.7 - 8.2 K/UL    Absolute Lymph # 0.9 0.5 - 4.6 K/UL    Absolute Mono # 1.1 0.1 - 1.3 K/UL    Absolute Eos # 0.0 0.0 - 0.8 K/UL    Basophils Absolute 0.1 0.0 - 0.2 K/UL    Absolute Immature Granulocyte 0.2 0.0 - 0.5 K/UL   CMP    Collection Time: 07/27/22  7:17 PM   Result Value Ref Range    Sodium 132 (L) 138 - 145 mmol/L    Potassium 4.4 3.5 - 5.1 mmol/L    Chloride 96 (L) 98 - 107 mmol/L    CO2 30 21 - 32 mmol/L    Anion Gap 6 (L) 7 - 16 mmol/L    Glucose 90 65 - 100 mg/dL    BUN 17 8 - 23 MG/DL    Creatinine 0.90 0.8 - 1.5 MG/DL    GFR African American >60 >60 ml/min/1.73m2    GFR Non- >60 >60 ml/min/1.73m2    Calcium 8.5 8.3 - 10.4 MG/DL    Total Bilirubin 1.1 0.2 - 1.1 MG/DL    ALT 94 (H) 12 - 65 U/L    AST 92 (H) 15 - 37 U/L    Alk Phosphatase 83 50 - 136 U/L    Total Protein 7.1 6.3 - 8.2 g/dL    Albumin 2.2 (L) 3.2 - 4.6 g/dL    Globulin 4.9 (H) 2.3 - 3.5 g/dL    Albumin/Globulin Ratio 0.4 (L) 1.2 - 3.5     Urinalysis    Collection Time: 07/27/22  7:17 PM   Result Value Ref Range    Color, UA DARK YELLOW      Appearance CLEAR      Specific Gravity, UA 1.022 1.001 - 1.023      pH, Urine 5.5 5.0 - 9.0      Protein, UA TRACE (A) NEG mg/dL    Glucose, UA Negative mg/dL    Ketones, Urine TRACE (A) NEG mg/dL    Bilirubin Urine Negative NEG      Blood, Urine Negative NEG      Urobilinogen, Urine 1.0 0.2 - 1.0 EU/dL    Nitrite, Urine Negative NEG      Leukocyte Esterase, Urine TRACE (A) NEG      WBC, UA 0-4 (A) NORM /hpf    RBC, UA 0-5 (A) NORM /hpf    Epithelial Cells UA 0-5 (A) NORM /hpf    BACTERIA, URINE Negative (A) NORM /hpf    Casts 2-5 (A) NORM /lpf   COVID-19, Rapid    Collection Time: 07/27/22  7:17 PM    Specimen: Nasopharyngeal   Result Value Ref Range    Source NASAL      SARS-CoV-2, Rapid Not detected NOTD     POCT Glucose    Collection Time: 07/27/22 11:26 PM   Result Value Ref Range    POC Glucose 109 (H) 65 - 100 mg/dL    Performed by: Nicolette    Culture, Urine    Collection Time: 07/27/22 11:30 PM    Specimen: URINE-CLEAN CATCH   Result Value Ref Range    Special Requests NO SPECIAL REQUESTS      Culture (A)       10,000 to 50,000 COLONIES/mL GRAM NEGATIVE RODS IDENTIFICATION AND SUSCEPTIBILITY TO FOLLOW    Culture <10,000 COLONIES/mL MIXED SKIN MARCO ANTONIO ISOLATED     Hemoglobin A1c    Collection Time: 07/27/22 11:46 PM   Result Value Ref Range    Hemoglobin A1C 5.3 4.8 - 5.6 %    eAG 105 mg/dL   Basic Metabolic Panel w/ Reflex to MG    Collection Time: 07/27/22 11:46 PM   Result Value Ref Range    Sodium 134 (L) 138 - 145 mmol/L    Potassium 3.2 (L) 3.5 - 5.1 mmol/L    Chloride 97 (L) 98 - 107 mmol/L    CO2 29 21 - 32 mmol/L    Anion Gap 8 7 - 16 mmol/L    Glucose 82 65 - 100 mg/dL    BUN 16 8 - 23 MG/DL    Creatinine 1.00 0.8 - 1.5 MG/DL    GFR African American >60 >60 ml/min/1.73m2    GFR Non- >60 >60 ml/min/1.73m2    Calcium 8.6 8.3 - 10.4 MG/DL   CBC with Auto Differential    Collection Time: 07/27/22 11:46 PM   Result Value Ref Range    WBC 10.3 4.3 - 11.1 K/uL    RBC 3.21 (L) 4.23 - 5.6 M/uL    Hemoglobin 10.2 (L) 13.6 - 17.2 g/dL    Hematocrit 31.1 (L) 41.1 - 50.3 %    MCV 96.9 79.6 - 97.8 FL    MCH 31.8 26.1 - 32.9 PG    MCHC 32.8 31.4 - 35.0 g/dL    RDW 14.7 (H) 11.9 - 14.6 %    Platelets 350 034 - 447 K/uL    MPV 10.4 9.4 - 12.3 FL    nRBC 0.00 0.0 - 0.2 K/uL    Differential Type AUTOMATED      Seg Neutrophils 80 (H) 43 - 78 %    Lymphocytes 8 (L) 13 - 44 %    Monocytes 9 4.0 - 12.0 %    Eosinophils % 0 (L) 0.5 - 7.8 %    Basophils 1 0.0 - 2.0 %    Immature Granulocytes 2 0.0 - 5.0 %    Segs Absolute 8.3 (H) 1.7 - 8.2 K/UL    Absolute Lymph # 0.9 0.5 - 4.6 K/UL    Absolute Mono # 0.9 0.1 - 1.3 K/UL    Absolute Eos # 0.0 0.0 - 0.8 K/UL    Basophils Absolute 0.1 0.0 - 0.2 K/UL    Absolute Immature Granulocyte 0.2 0.0 - 0.5 K/UL   Magnesium    Collection Time: 07/27/22 11:46 PM   Result Value Ref Range    Magnesium 2.2 1.8 - 2.4 mg/dL   POCT Glucose    Collection Time: 07/28/22  7:40 AM   Result Value Ref Range    POC Glucose 116 (H) 65 - 100 mg/dL    Performed by: OwlparrotitaCurefableCNA    POCT Glucose    Collection Time: 07/28/22 11:12 AM   Result Value Ref Range    POC Glucose 156 (H) 65 - 100 mg/dL    Performed by: EspitaMichelleCNA    POCT Glucose    Collection Time: 07/28/22  4:43 PM   Result Value Ref Range    POC Glucose 225 (H) 65 - 100 mg/dL    Performed by: EspitaMichelleCNA    POCT Glucose    Collection Time: 07/28/22  9:15 PM   Result Value Ref Range    POC Glucose 164 (H) 65 - 100 mg/dL    Performed by: David Garcia PPD TEST IN 24 HRS    Collection Time: 07/28/22 11:28 PM   Result Value Ref Range    PPD, (POC) Negative Negative    mm Induration 0 0 - 5 mm   CBC with Auto Differential    Collection Time: 07/29/22  7:18 AM   Result Value Ref Range    WBC 8.8 4.3 - 11.1 K/uL    RBC 3.00 (L) 4.23 - 5.6 M/uL    Hemoglobin 9.5 (L) 13.6 - 17.2 g/dL    Hematocrit 28.9 (L) 41.1 - 50.3 %    MCV 96.3 79.6 - 97.8 FL    MCH 31.7 26.1 - 32.9 PG    MCHC 32.9 31.4 - 35.0 g/dL    RDW 14.4 11.9 - 14.6 %    Platelets 605 733 - 225 K/uL    MPV 10.0 9.4 - 12.3 FL    nRBC 0.00 0.0 - 0.2 K/uL    Differential Type AUTOMATED      Seg Neutrophils 82 (H) 43 - 78 %    Lymphocytes 6 (L) 13 - 44 %    Monocytes 9 4.0 - 12.0 %    Eosinophils % 0 (L) 0.5 - 7.8 %    Basophils 1 0.0 - 2.0 %    Immature Granulocytes 2 0.0 - 5.0 %    Segs Absolute 7.3 1.7 - 8.2 K/UL    Absolute Lymph # 0.5 0.5 - 4.6 K/UL    Absolute Mono # 0.8 0.1 - 1.3 K/UL    Absolute Eos # 0.0 0.0 - 0.8 K/UL    Basophils Absolute 0.1 0.0 - 0.2 K/UL    Absolute Immature Granulocyte 0.1 0.0 - 0.5 K/UL   Basic Metabolic Panel w/ Reflex to MG    Collection Time: 07/29/22  7:18 AM   Result Value Ref Range    Sodium 134 (L) 136 - 145 mmol/L    Potassium 3.3 (L) 3.5 - 5.1 mmol/L    Chloride 95 (L) 98 - 107 mmol/L    CO2 32 21 - 32 mmol/L    Anion Gap 7 7 - 16 mmol/L    Glucose 161 (H) 65 - 100 mg/dL    BUN 8 8 - 23 MG/DL    Creatinine 0.60 (L) 0.8 - 1.5 MG/DL    GFR African American >60 >60 ml/min/1.73m2    GFR Non- >60 >60 ml/min/1.73m2    Calcium 8.4 8.3 - 10.4 MG/DL   Phosphorus    Collection Time: 07/29/22  7:18 AM   Result Value Ref Range    Phosphorus 2.6 2.3 - 3.7 MG/DL   Hepatitis Panel, Acute    Collection Time: 07/29/22  7:18 AM   Result Value Ref Range    Hep A IgM NONREACTIVE NR      Hep B Core Ab, IgM NONREACTIVE NR      Hepatitis B Surface Ag NONREACTIVE NR      Hepatitis C Ab NONREACTIVE NR     Magnesium    Collection Time: 07/29/22  7:18 AM   Result Value Ref Range    Magnesium 2.0 1.8 - 2.4 mg/dL   C-Reactive Protein    Collection Time: 07/29/22  7:18 AM   Result Value Ref Range    CRP 12.4 (H) 0.0 - 0.9 mg/dL   POCT Glucose    Collection Time: 07/29/22  8:08 AM   Result Value Ref Range    POC Glucose 145 (H) 65 - 100 mg/dL    Performed by: Radha    POCT Glucose    Collection Time: 07/29/22 12:17 PM   Result Value Ref Range    POC Glucose 196 (H) 65 - 100 mg/dL    Performed by: [place]    POCT Glucose    Collection Time: 07/29/22  4:16 PM   Result Value Ref Range    POC Glucose 177 (H) 65 - 100 mg/dL    Performed by: Sarah.     POCT Glucose    Collection Time: 07/29/22  9:04 PM   Result Value Ref Range    POC Glucose 207 (H) 65 - 100 mg/dL    Performed by: Analia Alberto [facility] PPD TEST IN 48 HRS    Collection Time: 07/29/22 11:39 PM   Result Value Ref Range    PPD, (POC) Negative Negative    mm Induration 0 0 - 5 mm   CBC with Auto Differential    Collection Time: 07/30/22  7:15 AM   Result Value Ref Range    WBC 7.4 4.3 - 11.1 K/uL    RBC 3.06 (L) 4.23 - 5.6 M/uL    Hemoglobin 9.8 (L) 13.6 - 17.2 g/dL    Hematocrit 29.4 (L) 41.1 - 50.3 %    MCV 96.1 79.6 - 97.8 FL    MCH 32.0 26.1 - 32.9 PG    MCHC 33.3 31.4 - 35.0 g/dL    RDW 14.5 11.9 - 14.6 %    Platelets 939 439 - 988 K/uL    MPV 9.9 9.4 - 12.3 FL    nRBC 0.00 0.0 - 0.2 K/uL    Differential Type AUTOMATED      Seg Neutrophils 82 (H) 43 - 78 % Lymphocytes 9 (L) 13 - 44 %    Monocytes 8 4.0 - 12.0 %    Eosinophils % 0 (L) 0.5 - 7.8 %    Basophils 0 0.0 - 2.0 %    Immature Granulocytes 1 0.0 - 5.0 %    Segs Absolute 6.0 1.7 - 8.2 K/UL    Absolute Lymph # 0.7 0.5 - 4.6 K/UL    Absolute Mono # 0.6 0.1 - 1.3 K/UL    Absolute Eos # 0.0 0.0 - 0.8 K/UL    Basophils Absolute 0.0 0.0 - 0.2 K/UL    Absolute Immature Granulocyte 0.1 0.0 - 0.5 K/UL   Basic Metabolic Panel w/ Reflex to MG    Collection Time: 07/30/22  7:15 AM   Result Value Ref Range    Sodium 132 (L) 138 - 145 mmol/L    Potassium 3.3 (L) 3.5 - 5.1 mmol/L    Chloride 96 (L) 98 - 107 mmol/L    CO2 31 21 - 32 mmol/L    Anion Gap 5 (L) 7 - 16 mmol/L    Glucose 160 (H) 65 - 100 mg/dL    BUN 7 (L) 8 - 23 MG/DL    Creatinine 0.70 (L) 0.8 - 1.5 MG/DL    GFR African American >60 >60 ml/min/1.73m2    GFR Non- >60 >60 ml/min/1.73m2    Calcium 8.7 8.3 - 10.4 MG/DL   Phosphorus    Collection Time: 07/30/22  7:15 AM   Result Value Ref Range    Phosphorus 2.6 2.3 - 3.7 MG/DL   Magnesium    Collection Time: 07/30/22  7:15 AM   Result Value Ref Range    Magnesium 1.8 1.8 - 2.4 mg/dL   POCT Glucose    Collection Time: 07/30/22  7:46 AM   Result Value Ref Range    POC Glucose 162 (H) 65 - 100 mg/dL    Performed by: Jes        Objective: Vital Signs are Stable, No Acute Distress, Alert and Oriented   Neurovascular exam is normal.    MRI LUMBAR SPINE FROM 7/30/22:  Narrative   EXAMINATION: MRI LUMBAR SPINE W WO CONTRAST 7/29/2022 2:21 PM       ACCESSION NUMBER: [id number]       COMPARISON: CT lumbar spine 7/27/2021. Lumbar spine MRI 6/2/2022. INDICATION: lower back pain after kyphoplasty       TECHNIQUE: Multisequence, multiplanar MR images were obtained of the lumbar   spine with and without the administration of 23 mL of ProHance intravenous   contrast.        FINDINGS:    For the purposes of this dictation, the last well-formed disc space is presumed   to be L5-S1.         The visualized spinal cord is unremarkable. The conus medullaris terminates at a   normal level. Post procedural changes from prior L2 and L4 vertebral body kyphoplasties. There   is confluent decreased T1 and increased T2 signal within the L2 vertebral body,   the L4 vertebral body, as well as much of the L3 vertebral body. New increased   T2 signal within the L2-L3 and L3-L4 disc spaces. There is posterior epidural   phlegmonous change extending from L2-L4 with a a small posterior epidural   abscess at the level of L2-L3 measuring approximately 8 mm AP by 9 mm   craniocaudal (series 10, image 16, series 9, image 10). The paravertebral   phlegmonous change superimposed on the pre-existing degenerative changes results   in severe spinal canal narrowing L2-L3 and L3-L4, which is slightly worsened   from prior MRI. Paravertebral phlegmonous change and enhancement in the soft tissues at these   levels, with a rim-enhancing fluid collection in the proximal left psoas muscle   at the level of the L2-L3 disc space measuring 1.9 x 1.1 x 3.2 cm (series 10,   image 17, series 9, image 17). No other abnormal osseous signal. Advanced multilevel degenerative disease and   facet arthropathy at the remaining lumbar levels is similar to prior with   moderate spinal canal narrowing at L1-L2 and L4-L5 and multilevel bilateral   neural foraminal narrowing. Impression       1. Sequela of old L2 and L4 kyphoplasty is with interval development of   discitis/osteomyelitis extending from L2 to L4 with prominent paravertebral   phlegmonous change, including a small left psoas muscle abscess. 2. Posterior epidural phlegmonous change from L2-L4 with a small posterior   epidural abscess at the level of L2-L3. This superimposed on the preexisting   degenerative changes results in severe spinal canal narrowing at L2-L3 and   L3-L4, which is slightly worsened from prior MRI.        3. Multilevel degenerative changes of the remainder of the lumbar spine with   multilevel spinal canal and neural foraminal narrowing. MRI CERVICAL SPINE FROM 7/30/22:  this patient  Narrative   EXAMINATION: MRI CERVICAL SPINE WO CONTRAST 7/29/2022 1:30 PM       ACCESSION NUMBER: [id number]       COMPARISON: Same-day brain MRI. INDICATION: Leg claudication       TECHNIQUE: Multi-sequence, multi-planar MR images were obtained of the cervical   spine without the administration of intravenous contrast.       FINDINGS:    The visualized posterior fossa is better evaluated on same-day MRI brain. The spinal cord demonstrates normal signal and morphology. Vertebral body heights are maintained. Mild anterolisthesis of C4 on C5 and C5   on C6. No acute osseous abnormality. No suspicious osseous lesion. Multilevel intervertebral disc height loss. Multilevel facet arthropathy. The paravertebral soft tissues are within normal limits. Level specific findings:       C2-C3: Posterior disc bulge with superimposed right paracentral protrusion and   uncovertebral hypertrophy. Mild spinal canal narrowing. Moderate right-sided   neural foraminal narrowing. No left neural foraminal narrowing. C3-C4: Facet arthropathy and uncovertebral hypertrophy. Mild spinal canal   narrowing. Moderate left and mild right neural foraminal narrowing. C4-C5: Disc osteophyte complex with central disc protrusion which indents the   ventral cord and results in moderate to severe spinal canal. Facet arthropathy,   and uncovertebral hypertrophy. Moderate right and severe left neural foraminal   narrowing. C5-C6: Posterior disc osteophyte complex, likely due to hypertrophy, and facet   arthropathy resulting in mild spinal canal narrowing. Moderate left and mild   right neural foraminal narrowing. C6-C7: Posterior disc osteophyte complex, uncovertebral hypertrophy, and facet   arthropathy. Mild spinal canal narrowing. Moderate bilateral neural foraminal   narrowing. C7-T1: No canal or foraminal stenosis. Impression       1. Multilevel degenerative changes of the cervical spine resulting in multilevel   spinal canal narrowing, most pronounced at T4-C5, where there is moderate to   severe spinal canal narrowing. High-grade multilevel neural foraminal narrowing,   as detailed above. 2. Mild anterolisthesis of C4 on C5 and C5 on C6, likely degenerative. Assessment:  Patient Active Problem List   Diagnosis    H/O endarterectomy    Arterial degeneration    Atrial fibrillation (HCC)    Complicated wound infection    Allergic rhinitis    Elevated troponin    HTN (hypertension)    Pulmonary emphysema (HCC)    Atherosclerosis of native arteries of extremity with intermittent claudication (HCC)    Severe obesity (BMI 35.0-39. 9) with comorbidity ([facility] [address]    COPD (chronic obstructive pulmonary disease) (HCC)    Personal history of tobacco use, presenting hazards to health    Intermittent spinal claudication ([facility])    Cigarette nicotine dependence in remission    Irregular heart beat    Acute metabolic encephalopathy    Chest pain    Hiatal hernia    Diabetic neuropathy (HCC)    Normocytic anemia    Chronic pain of right knee    Sleep apnea    Osteoarthritis    Encounter for long-term (current) drug use    Ischemic cardiomyopathy    Ventricular tachycardia (HCC)    VT (ventricular tachycardia) (HCC)    Coronary artery disease involving native coronary artery of native heart without angina pectoris    Benign neoplasm of colon    PAD (peripheral artery disease) (HCC)    Asthma    Orthostatic hypotension    Hyperlipidemia    S/P angioplasty with stent    DM (diabetes mellitus) type II, controlled, with peripheral vascular disorder (HCC)    Toe laceration    Obstructive sleep apnea    MINI (acute kidney injury) ([facility] [address]    Type 2 diabetes with nephropathy (HCC)    Hypoxia    Abnormal nuclear cardiac imaging test PVC's (premature ventricular contractions)    Asbestos exposure    Sepsis ([facility] [address]    ICD (implantable cardioverter-defibrillator) in place    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, vertebra(e), init ([facility] [address]    General weakness    Acute cystitis without hematuria    Low back pain without sciatica    Back pain    DNI (do not intubate)    UTI (urinary tract infection)    Elevated liver enzymes    Anemia    Hyponatremia       Plan:   Abscess of the lumbar spine. Planning to have IR aspirate the disc space at L2-L3 or L3 or L4 for culture and psoas abscess  Will need to stop plavix and change to lovenox bridge which Wei Payan has contacted hospitalist about. Awaiting final urine culturue  Currently on Vanc and zosyn  ID consulted   Continue monitoring labs to see response to abx. If patient doesn't respond to abx,there may be indication for surgical intervention  We will continue to follow conservatively for now.      Signed By: ABDOUL Cardozo

## 2022-07-31 LAB
ANION GAP SERPL CALC-SCNC: 5 MMOL/L (ref 7–16)
APTT PPP: 49.8 SEC (ref 24.1–35.1)
APTT PPP: 50 SEC (ref 24.1–35.1)
APTT PPP: 53.1 SEC (ref 24.1–35.1)
BACTERIA SPEC CULT: ABNORMAL
BACTERIA SPEC CULT: ABNORMAL
BASOPHILS # BLD: 0 K/UL (ref 0–0.2)
BASOPHILS NFR BLD: 0 % (ref 0–2)
BUN SERPL-MCNC: 6 MG/DL (ref 8–23)
CALCIUM SERPL-MCNC: 8.7 MG/DL (ref 8.3–10.4)
CHLORIDE SERPL-SCNC: 96 MMOL/L (ref 98–107)
CO2 SERPL-SCNC: 32 MMOL/L (ref 21–32)
CREAT SERPL-MCNC: 0.7 MG/DL (ref 0.8–1.5)
DIFFERENTIAL METHOD BLD: ABNORMAL
EOSINOPHIL # BLD: 0 K/UL (ref 0–0.8)
EOSINOPHIL NFR BLD: 0 % (ref 0.5–7.8)
ERYTHROCYTE [DISTWIDTH] IN BLOOD BY AUTOMATED COUNT: 14.6 % (ref 11.9–14.6)
GLUCOSE BLD STRIP.AUTO-MCNC: 152 MG/DL (ref 65–100)
GLUCOSE BLD STRIP.AUTO-MCNC: 190 MG/DL (ref 65–100)
GLUCOSE BLD STRIP.AUTO-MCNC: 211 MG/DL (ref 65–100)
GLUCOSE BLD STRIP.AUTO-MCNC: 253 MG/DL (ref 65–100)
GLUCOSE SERPL-MCNC: 156 MG/DL (ref 65–100)
HCT VFR BLD AUTO: 27.6 % (ref 41.1–50.3)
HGB BLD-MCNC: 9.1 G/DL (ref 13.6–17.2)
IMM GRANULOCYTES # BLD AUTO: 0.1 K/UL (ref 0–0.5)
IMM GRANULOCYTES NFR BLD AUTO: 2 % (ref 0–5)
LYMPHOCYTES # BLD: 0.9 K/UL (ref 0.5–4.6)
LYMPHOCYTES NFR BLD: 12 % (ref 13–44)
MAGNESIUM SERPL-MCNC: 1.8 MG/DL (ref 1.8–2.4)
MCH RBC QN AUTO: 31.4 PG (ref 26.1–32.9)
MCHC RBC AUTO-ENTMCNC: 33 G/DL (ref 31.4–35)
MCV RBC AUTO: 95.2 FL (ref 79.6–97.8)
MONOCYTES # BLD: 0.6 K/UL (ref 0.1–1.3)
MONOCYTES NFR BLD: 8 % (ref 4–12)
NEUTS SEG # BLD: 5.8 K/UL (ref 1.7–8.2)
NEUTS SEG NFR BLD: 78 % (ref 43–78)
NRBC # BLD: 0 K/UL (ref 0–0.2)
PHOSPHATE SERPL-MCNC: 3 MG/DL (ref 2.3–3.7)
PLATELET # BLD AUTO: 269 K/UL (ref 150–450)
PMV BLD AUTO: 10.2 FL (ref 9.4–12.3)
POTASSIUM SERPL-SCNC: 3.4 MMOL/L (ref 3.5–5.1)
RBC # BLD AUTO: 2.9 M/UL (ref 4.23–5.6)
SERVICE CMNT-IMP: ABNORMAL
SODIUM SERPL-SCNC: 133 MMOL/L (ref 138–145)
WBC # BLD AUTO: 7.4 K/UL (ref 4.3–11.1)

## 2022-07-31 PROCEDURE — 80048 BASIC METABOLIC PNL TOTAL CA: CPT

## 2022-07-31 PROCEDURE — 2580000003 HC RX 258: Performed by: INTERNAL MEDICINE

## 2022-07-31 PROCEDURE — 85025 COMPLETE CBC W/AUTO DIFF WBC: CPT

## 2022-07-31 PROCEDURE — 83735 ASSAY OF MAGNESIUM: CPT

## 2022-07-31 PROCEDURE — 94760 N-INVAS EAR/PLS OXIMETRY 1: CPT

## 2022-07-31 PROCEDURE — 1100000000 HC RM PRIVATE

## 2022-07-31 PROCEDURE — 6360000002 HC RX W HCPCS: Performed by: HOSPITALIST

## 2022-07-31 PROCEDURE — 6370000000 HC RX 637 (ALT 250 FOR IP): Performed by: INTERNAL MEDICINE

## 2022-07-31 PROCEDURE — 94640 AIRWAY INHALATION TREATMENT: CPT

## 2022-07-31 PROCEDURE — 85730 THROMBOPLASTIN TIME PARTIAL: CPT

## 2022-07-31 PROCEDURE — 36415 COLL VENOUS BLD VENIPUNCTURE: CPT

## 2022-07-31 PROCEDURE — 84100 ASSAY OF PHOSPHORUS: CPT

## 2022-07-31 PROCEDURE — 6370000000 HC RX 637 (ALT 250 FOR IP): Performed by: STUDENT IN AN ORGANIZED HEALTH CARE EDUCATION/TRAINING PROGRAM

## 2022-07-31 PROCEDURE — 82962 GLUCOSE BLOOD TEST: CPT

## 2022-07-31 PROCEDURE — 6360000002 HC RX W HCPCS: Performed by: STUDENT IN AN ORGANIZED HEALTH CARE EDUCATION/TRAINING PROGRAM

## 2022-07-31 RX ORDER — HEPARIN SODIUM 1000 [USP'U]/ML
2000 INJECTION, SOLUTION INTRAVENOUS; SUBCUTANEOUS ONCE
Status: COMPLETED | OUTPATIENT
Start: 2022-07-31 | End: 2022-07-31

## 2022-07-31 RX ORDER — OXYCODONE HYDROCHLORIDE AND ACETAMINOPHEN 5; 325 MG/1; MG/1
1 TABLET ORAL EVERY 4 HOURS PRN
Status: DISCONTINUED | OUTPATIENT
Start: 2022-07-31 | End: 2022-08-01

## 2022-07-31 RX ORDER — AMLODIPINE BESYLATE 10 MG/1
10 TABLET ORAL DAILY
Status: DISCONTINUED | OUTPATIENT
Start: 2022-07-31 | End: 2022-08-01

## 2022-07-31 RX ORDER — TAMSULOSIN HYDROCHLORIDE 0.4 MG/1
0.4 CAPSULE ORAL DAILY
Status: DISCONTINUED | OUTPATIENT
Start: 2022-07-31 | End: 2022-08-08 | Stop reason: HOSPADM

## 2022-07-31 RX ORDER — MORPHINE SULFATE 2 MG/ML
2 INJECTION, SOLUTION INTRAMUSCULAR; INTRAVENOUS ONCE
Status: COMPLETED | OUTPATIENT
Start: 2022-08-01 | End: 2022-07-31

## 2022-07-31 RX ADMIN — CALCITONIN SALMON 1 SPRAY: 200 SPRAY, METERED NASAL at 07:54

## 2022-07-31 RX ADMIN — FLUTICASONE PROPIONATE 2 SPRAY: 50 SPRAY, METERED NASAL at 07:54

## 2022-07-31 RX ADMIN — SODIUM CHLORIDE, PRESERVATIVE FREE 10 ML: 5 INJECTION INTRAVENOUS at 21:02

## 2022-07-31 RX ADMIN — HEPARIN SODIUM 14 UNITS/KG/HR: 10000 INJECTION, SOLUTION INTRAVENOUS at 05:51

## 2022-07-31 RX ADMIN — HEPARIN SODIUM 20 UNITS/KG/HR: 10000 INJECTION, SOLUTION INTRAVENOUS at 16:50

## 2022-07-31 RX ADMIN — VALSARTAN 320 MG: 320 TABLET, FILM COATED ORAL at 07:54

## 2022-07-31 RX ADMIN — OXYCODONE AND ACETAMINOPHEN 1 TABLET: 5; 325 TABLET ORAL at 22:14

## 2022-07-31 RX ADMIN — SODIUM CHLORIDE, PRESERVATIVE FREE 10 ML: 5 INJECTION INTRAVENOUS at 08:01

## 2022-07-31 RX ADMIN — CARVEDILOL 25 MG: 25 TABLET, FILM COATED ORAL at 16:48

## 2022-07-31 RX ADMIN — LATANOPROST 1 DROP: 50 SOLUTION OPHTHALMIC at 07:54

## 2022-07-31 RX ADMIN — HEPARIN SODIUM 4000 UNITS: 1000 INJECTION INTRAVENOUS; SUBCUTANEOUS at 09:07

## 2022-07-31 RX ADMIN — MORPHINE SULFATE 2 MG: 2 INJECTION, SOLUTION INTRAMUSCULAR; INTRAVENOUS at 23:56

## 2022-07-31 RX ADMIN — HEPARIN SODIUM 2000 UNITS: 1000 INJECTION INTRAVENOUS; SUBCUTANEOUS at 16:50

## 2022-07-31 RX ADMIN — HEPARIN SODIUM 2000 UNITS: 1000 INJECTION INTRAVENOUS; SUBCUTANEOUS at 01:54

## 2022-07-31 RX ADMIN — MOMETASONE FUROATE AND FORMOTEROL FUMARATE DIHYDRATE 2 PUFF: 200; 5 AEROSOL RESPIRATORY (INHALATION) at 09:40

## 2022-07-31 RX ADMIN — MOMETASONE FUROATE AND FORMOTEROL FUMARATE DIHYDRATE 2 PUFF: 200; 5 AEROSOL RESPIRATORY (INHALATION) at 20:07

## 2022-07-31 RX ADMIN — MONTELUKAST 10 MG: 10 TABLET, FILM COATED ORAL at 20:35

## 2022-07-31 RX ADMIN — CARVEDILOL 25 MG: 25 TABLET, FILM COATED ORAL at 07:54

## 2022-07-31 RX ADMIN — AMLODIPINE BESYLATE 10 MG: 10 TABLET ORAL at 09:05

## 2022-07-31 RX ADMIN — TAMSULOSIN HYDROCHLORIDE 0.4 MG: 0.4 CAPSULE ORAL at 11:57

## 2022-07-31 RX ADMIN — AMIODARONE HYDROCHLORIDE 200 MG: 200 TABLET ORAL at 07:54

## 2022-07-31 RX ADMIN — FUROSEMIDE 20 MG: 20 TABLET ORAL at 07:54

## 2022-07-31 RX ADMIN — INSULIN LISPRO 1 UNITS: 100 INJECTION, SOLUTION INTRAVENOUS; SUBCUTANEOUS at 11:50

## 2022-07-31 RX ADMIN — AMIODARONE HYDROCHLORIDE 200 MG: 200 TABLET ORAL at 20:35

## 2022-07-31 ASSESSMENT — PAIN DESCRIPTION - ONSET: ONSET: ON-GOING

## 2022-07-31 ASSESSMENT — PAIN SCALES - GENERAL
PAINLEVEL_OUTOF10: 8
PAINLEVEL_OUTOF10: 4
PAINLEVEL_OUTOF10: 8

## 2022-07-31 ASSESSMENT — PAIN DESCRIPTION - PAIN TYPE: TYPE: ACUTE PAIN

## 2022-07-31 ASSESSMENT — PAIN DESCRIPTION - LOCATION
LOCATION: BACK;ABDOMEN
LOCATION: PELVIS

## 2022-07-31 ASSESSMENT — PAIN DESCRIPTION - DESCRIPTORS
DESCRIPTORS: ACHING
DESCRIPTORS: ACHING

## 2022-07-31 ASSESSMENT — PAIN - FUNCTIONAL ASSESSMENT: PAIN_FUNCTIONAL_ASSESSMENT: PREVENTS OR INTERFERES SOME ACTIVE ACTIVITIES AND ADLS

## 2022-07-31 ASSESSMENT — PAIN DESCRIPTION - ORIENTATION
ORIENTATION: RIGHT;LEFT
ORIENTATION: MID

## 2022-07-31 ASSESSMENT — PAIN DESCRIPTION - FREQUENCY: FREQUENCY: INTERMITTENT

## 2022-07-31 NOTE — PROGRESS NOTES
2022         Post Op day: * No surgery found *     Admit Date: 2022  Admit Diagnosis: Back pain [M54.9]  General weakness [R53.1]  Acute cystitis without hematuria [N30.00]  Low back pain without sciatica, unspecified back pain laterality, unspecified chronicity [M54.50]  Lumbar 2, Lumbar 4 Kyphoplasty And Any Other Indicated Levels  2022    Subjective: Doing well, No complaints, No SOB, No Chest Pain, No Nausea or Vomitting. States minimal back achiness. NO numbness and tingling.    PT/OT:                             Weight Bearing Status: WBAT  BMP:  Recent Labs     22   BUN 8 7* 6*   * 132* 133*   K 3.3* 3.3* 3.4*   CL 95* 96* 96*   CO2 32 31 32     Patient Vitals for the past 8 hrs:   BP Temp Temp src Pulse Resp SpO2   22 0941 -- -- -- 58 16 94 %   22 0720 (!) 166/79 98.2 °F (36.8 °C) Oral 62 20 93 %   22 0356 (!) 164/84 98.7 °F (37.1 °C) Axillary 67 16 (!) 86 %     Temp (24hrs), Av.5 °F (36.9 °C), Min:98 °F (36.7 °C), Max:99.1 °F (37.3 °C)    CBC:  Recent Labs     2215 2230   WBC 8.8 7.4 7.4   HGB 9.5* 9.8* 9.1*   HCT 28.9* 29.4* 27.6*    265 269       Microbiology:     Recent Results (from the past 72 hour(s))   POCT Glucose    Collection Time: 22 11:12 AM   Result Value Ref Range    POC Glucose 156 (H) 65 - 100 mg/dL    Performed by: [employer]    POCT Glucose    Collection Time: 22  4:43 PM   Result Value Ref Range    POC Glucose 225 (H) 65 - 100 mg/dL    Performed by: Sara    POCT Glucose    Collection Time: 22  9:15 PM   Result Value Ref Range    POC Glucose 164 (H) 65 - 100 mg/dL    Performed by: Analia Vásquez PPD TEST IN 24 HRS    Collection Time: 22 11:28 PM   Result Value Ref Range    PPD, (POC) Negative Negative    mm Induration 0 0 - 5 mm   CBC with Auto Differential    Collection Time: 22  7:18 AM Result Value Ref Range    WBC 8.8 4.3 - 11.1 K/uL    RBC 3.00 (L) 4.23 - 5.6 M/uL    Hemoglobin 9.5 (L) 13.6 - 17.2 g/dL    Hematocrit 28.9 (L) 41.1 - 50.3 %    MCV 96.3 79.6 - 97.8 FL    MCH 31.7 26.1 - 32.9 PG    MCHC 32.9 31.4 - 35.0 g/dL    RDW 14.4 11.9 - 14.6 %    Platelets 084 763 - 024 K/uL    MPV 10.0 9.4 - 12.3 FL    nRBC 0.00 0.0 - 0.2 K/uL    Differential Type AUTOMATED      Seg Neutrophils 82 (H) 43 - 78 %    Lymphocytes 6 (L) 13 - 44 %    Monocytes 9 4.0 - 12.0 %    Eosinophils % 0 (L) 0.5 - 7.8 %    Basophils 1 0.0 - 2.0 %    Immature Granulocytes 2 0.0 - 5.0 %    Segs Absolute 7.3 1.7 - 8.2 K/UL    Absolute Lymph # 0.5 0.5 - 4.6 K/UL    Absolute Mono # 0.8 0.1 - 1.3 K/UL    Absolute Eos # 0.0 0.0 - 0.8 K/UL    Basophils Absolute 0.1 0.0 - 0.2 K/UL    Absolute Immature Granulocyte 0.1 0.0 - 0.5 K/UL   Basic Metabolic Panel w/ Reflex to MG    Collection Time: 07/29/22  7:18 AM   Result Value Ref Range    Sodium 134 (L) 136 - 145 mmol/L    Potassium 3.3 (L) 3.5 - 5.1 mmol/L    Chloride 95 (L) 98 - 107 mmol/L    CO2 32 21 - 32 mmol/L    Anion Gap 7 7 - 16 mmol/L    Glucose 161 (H) 65 - 100 mg/dL    BUN 8 8 - 23 MG/DL    Creatinine 0.60 (L) 0.8 - 1.5 MG/DL    GFR African American >60 >60 ml/min/1.73m2    GFR Non- >60 >60 ml/min/1.73m2    Calcium 8.4 8.3 - 10.4 MG/DL   Phosphorus    Collection Time: 07/29/22  7:18 AM   Result Value Ref Range    Phosphorus 2.6 2.3 - 3.7 MG/DL   Hepatitis Panel, Acute    Collection Time: 07/29/22  7:18 AM   Result Value Ref Range    Hep A IgM NONREACTIVE NR      Hep B Core Ab, IgM NONREACTIVE NR      Hepatitis B Surface Ag NONREACTIVE NR      Hepatitis C Ab NONREACTIVE NR     Magnesium    Collection Time: 07/29/22  7:18 AM   Result Value Ref Range    Magnesium 2.0 1.8 - 2.4 mg/dL   C-Reactive Protein    Collection Time: 07/29/22  7:18 AM   Result Value Ref Range    CRP 12.4 (H) 0.0 - 0.9 mg/dL   POCT Glucose    Collection Time: 07/29/22  8:08 AM   Result Value Ref Range    POC Glucose 145 (H) 65 - 100 mg/dL    Performed by: Radha    POCT Glucose    Collection Time: 07/29/22 12:17 PM   Result Value Ref Range    POC Glucose 196 (H) 65 - 100 mg/dL    Performed by: [place]    POCT Glucose    Collection Time: 07/29/22  4:16 PM   Result Value Ref Range    POC Glucose 177 (H) 65 - 100 mg/dL    Performed by: Mateo Frey     POCT Glucose    Collection Time: 07/29/22  9:04 PM   Result Value Ref Range    POC Glucose 207 (H) 65 - 100 mg/dL    Performed by: Tj Velázquez PPD TEST IN 48 HRS    Collection Time: 07/29/22 11:39 PM   Result Value Ref Range    PPD, (POC) Negative Negative    mm Induration 0 0 - 5 mm   CBC with Auto Differential    Collection Time: 07/30/22  7:15 AM   Result Value Ref Range    WBC 7.4 4.3 - 11.1 K/uL    RBC 3.06 (L) 4.23 - 5.6 M/uL    Hemoglobin 9.8 (L) 13.6 - 17.2 g/dL    Hematocrit 29.4 (L) 41.1 - 50.3 %    MCV 96.1 79.6 - 97.8 FL    MCH 32.0 26.1 - 32.9 PG    MCHC 33.3 31.4 - 35.0 g/dL    RDW 14.5 11.9 - 14.6 %    Platelets 828 514 - 982 K/uL    MPV 9.9 9.4 - 12.3 FL    nRBC 0.00 0.0 - 0.2 K/uL    Differential Type AUTOMATED      Seg Neutrophils 82 (H) 43 - 78 %    Lymphocytes 9 (L) 13 - 44 %    Monocytes 8 4.0 - 12.0 %    Eosinophils % 0 (L) 0.5 - 7.8 %    Basophils 0 0.0 - 2.0 %    Immature Granulocytes 1 0.0 - 5.0 %    Segs Absolute 6.0 1.7 - 8.2 K/UL    Absolute Lymph # 0.7 0.5 - 4.6 K/UL    Absolute Mono # 0.6 0.1 - 1.3 K/UL    Absolute Eos # 0.0 0.0 - 0.8 K/UL    Basophils Absolute 0.0 0.0 - 0.2 K/UL    Absolute Immature Granulocyte 0.1 0.0 - 0.5 K/UL   Basic Metabolic Panel w/ Reflex to MG    Collection Time: 07/30/22  7:15 AM   Result Value Ref Range    Sodium 132 (L) 138 - 145 mmol/L    Potassium 3.3 (L) 3.5 - 5.1 mmol/L    Chloride 96 (L) 98 - 107 mmol/L    CO2 31 21 - 32 mmol/L    Anion Gap 5 (L) 7 - 16 mmol/L    Glucose 160 (H) 65 - 100 mg/dL    BUN 7 (L) 8 - 23 MG/DL    Creatinine 0.70 (L) 0.8 - 1.5 MG/DL    GFR African American >60 >60 ml/min/1.73m2    GFR Non- >60 >60 ml/min/1.73m2    Calcium 8.7 8.3 - 10.4 MG/DL   Phosphorus    Collection Time: 07/30/22  7:15 AM   Result Value Ref Range    Phosphorus 2.6 2.3 - 3.7 MG/DL   Magnesium    Collection Time: 07/30/22  7:15 AM   Result Value Ref Range    Magnesium 1.8 1.8 - 2.4 mg/dL   POCT Glucose    Collection Time: 07/30/22  7:46 AM   Result Value Ref Range    POC Glucose 162 (H) 65 - 100 mg/dL    Performed by:  Jes    APTT    Collection Time: 07/30/22  9:53 AM   Result Value Ref Range    PTT 36.6 (H) 24.1 - 35.1 SEC   Protime-INR    Collection Time: 07/30/22  9:53 AM   Result Value Ref Range    Protime 16.3 (H) 12.6 - 14.5 sec    INR 1.3     POCT Glucose    Collection Time: 07/30/22 11:36 AM   Result Value Ref Range    POC Glucose 214 (H) 65 - 100 mg/dL    Performed by: Jan Kearns    Culture, Blood 1    Collection Time: 07/30/22  2:49 PM    Specimen: Blood   Result Value Ref Range    Special Requests RIGHT  HAND        Culture NO GROWTH AFTER 16 HOURS     Culture, Blood 1    Collection Time: 07/30/22  2:49 PM    Specimen: Blood   Result Value Ref Range    Special Requests LEFT  HAND        Culture NO GROWTH AFTER 16 HOURS     Transthoracic echocardiogram (TTE) complete with contrast, bubble, strain, and 3D PRN    Collection Time: 07/30/22  3:37 PM   Result Value Ref Range    LV EDV A2C 256 mL    LV EDV A4C 283 mL    LV ESV A2C 134 mL    LV ESV A4C 130 mL    IVSd 0.9 0.6 - 1.0 cm    LVIDd 6.7 (A) 4.2 - 5.9 cm    LVIDs 5.0 cm    LVOT Diameter 2.5 cm    LVOT Mean Gradient 3 mmHg    LVOT VTI 25.8 cm    LVOT Peak Velocity 1.1 m/s    LVOT Peak Gradient 5 mmHg    LVPWd 1.0 0.6 - 1.0 cm    LV E' Lateral Velocity 8 cm/s    LV E' Septal Velocity 7 cm/s    LV Ejection Fraction A2C 48 %    LV Ejection Fraction A4C 54 %    EF BP 52 (A) 55 - 100 %    LVOT Area 4.9 cm2    LVOT .6 ml    LA Minor Axis 7.0 cm    LA Major Axis 6.9 cm LA Area 2C 25.6 cm2    LA Area 4C 26.4 cm2    LA Volume BP 81 (A) 18 - 58 mL    LA Diameter 4.5 cm    AV Mean Velocity 1.3 m/s    AV Mean Gradient 7 mmHg    AV VTI 42.5 cm    AV Peak Velocity 1.8 m/s    AV Peak Gradient 13 mmHg    AV Area by VTI 3.0 cm2    AV Area by Peak Velocity 3.1 cm2    Aortic Root 3.9 cm    Ascending Aorta 3.7 cm    IVC Proxmal 2.7 cm    MR .0 cm    MV Mean Gradient 4 mmHg    MV VTI 47.1 cm    MV Mean Velocity 0.9 m/s    MR Peak Velocity 5.6 m/s    MR Peak Gradient 125 mmHg    MV Max Velocity 1.7 m/s    MV Peak Gradient 11 mmHg    MV E Wave Deceleration Time 219.0 ms    MV A Velocity 1.04 m/s    MV E Velocity 1.38 m/s    MV Area by VTI 2.7 cm2    PV AT 71.0 ms    PV Max Velocity 1.3 m/s    PV Peak Gradient 7 mmHg    Est. RA Pressure 8 mmHg    RVIDd 4.1 cm    RV Basal Dimension 4.6 cm    TAPSE 2.4 1.7 cm    TR Max Velocity 2.17 m/s    TR Peak Gradient 19 mmHg    Body Surface Area 2.46 m2    Fractional Shortening 2D 25 28 - 44 %    LV ESV Index A4C 54 mL/m2    LV EDV Index A4C 117 mL/m2    LV ESV Index A2C 56 mL/m2    LV EDV Index A2C 106 mL/m2    LVIDd Index 2.78 cm/m2    LVIDs Index 2.07 cm/m2    LV RWT Ratio 0.30     LV Mass 2D 279.6 (A) 88 - 224 g    LV Mass 2D Index 116.0 (A) 49 - 115 g/m2    MV E/A 1.33     E/E' Ratio (Averaged) 18.48     E/E' Lateral 17.25     E/E' Septal 19.71     LA Volume Index BP 34 16 - 34 ml/m2    LVOT Stroke Volume Index 52.5 mL/m2    LA Size Index 1.87 cm/m2    LA/AO Root Ratio 1.15     Ao Root Index 1.62 cm/m2    Ascending Aorta Index 1.54 cm/m2    AV Velocity Ratio 0.61     LVOT:AV VTI Index 0.61     CORI/BSA VTI 1.2 cm2/m2    CORI/BSA Peak Velocity 1.3 cm2/m2    MV:LVOT VTI Index 1.83     RVSP 27 mmHg   POCT Glucose    Collection Time: 07/30/22  4:21 PM   Result Value Ref Range    POC Glucose 208 (H) 65 - 100 mg/dL    Performed by: Sarah.     APTT    Collection Time: 07/30/22  5:07 PM   Result Value Ref Range    PTT 41.8 (H) 24.1 - 35.1 SEC POCT Glucose    Collection Time: 07/30/22  8:25 PM   Result Value Ref Range    POC Glucose 174 (H) 65 - 100 mg/dL    Performed by: Rosamaria    APTT    Collection Time: 07/31/22 12:29 AM   Result Value Ref Range    PTT 50.0 (H) 24.1 - 35.1 SEC   CBC with Auto Differential    Collection Time: 07/31/22  7:30 AM   Result Value Ref Range    WBC 7.4 4.3 - 11.1 K/uL    RBC 2.90 (L) 4.23 - 5.6 M/uL    Hemoglobin 9.1 (L) 13.6 - 17.2 g/dL    Hematocrit 27.6 (L) 41.1 - 50.3 %    MCV 95.2 79.6 - 97.8 FL    MCH 31.4 26.1 - 32.9 PG    MCHC 33.0 31.4 - 35.0 g/dL    RDW 14.6 11.9 - 14.6 %    Platelets 515 198 - 506 K/uL    MPV 10.2 9.4 - 12.3 FL    nRBC 0.00 0.0 - 0.2 K/uL    Differential Type AUTOMATED      Seg Neutrophils 78 43 - 78 %    Lymphocytes 12 (L) 13 - 44 %    Monocytes 8 4.0 - 12.0 %    Eosinophils % 0 (L) 0.5 - 7.8 %    Basophils 0 0.0 - 2.0 %    Immature Granulocytes 2 0.0 - 5.0 %    Segs Absolute 5.8 1.7 - 8.2 K/UL    Absolute Lymph # 0.9 0.5 - 4.6 K/UL    Absolute Mono # 0.6 0.1 - 1.3 K/UL    Absolute Eos # 0.0 0.0 - 0.8 K/UL    Basophils Absolute 0.0 0.0 - 0.2 K/UL    Absolute Immature Granulocyte 0.1 0.0 - 0.5 K/UL   Basic Metabolic Panel w/ Reflex to MG    Collection Time: 07/31/22  7:30 AM   Result Value Ref Range    Sodium 133 (L) 138 - 145 mmol/L    Potassium 3.4 (L) 3.5 - 5.1 mmol/L    Chloride 96 (L) 98 - 107 mmol/L    CO2 32 21 - 32 mmol/L    Anion Gap 5 (L) 7 - 16 mmol/L    Glucose 156 (H) 65 - 100 mg/dL    BUN 6 (L) 8 - 23 MG/DL    Creatinine 0.70 (L) 0.8 - 1.5 MG/DL    GFR African American >60 >60 ml/min/1.73m2    GFR Non- >60 >60 ml/min/1.73m2    Calcium 8.7 8.3 - 10.4 MG/DL   Phosphorus    Collection Time: 07/31/22  7:30 AM   Result Value Ref Range    Phosphorus 3.0 2.3 - 3.7 MG/DL   APTT    Collection Time: 07/31/22  7:30 AM   Result Value Ref Range    PTT 49.8 (H) 24.1 - 35.1 SEC   Magnesium    Collection Time: 07/31/22  7:30 AM   Result Value Ref Range    Magnesium 1.8 1.8 - 2.4 mg/dL   POCT Glucose    Collection Time: 07/31/22  7:36 AM   Result Value Ref Range    POC Glucose 152 (H) 65 - 100 mg/dL    Performed by: [place]        Objective: Vital Signs are Stable, No Acute Distress, Alert and Oriented  Neurovascular exam is normal.    Assessment:  Patient Active Problem List   Diagnosis    H/O endarterectomy    Arterial degeneration    Atrial fibrillation (HCC)    Complicated wound infection    Allergic rhinitis    Elevated troponin    HTN (hypertension)    Pulmonary emphysema (HCC)    Atherosclerosis of native arteries of extremity with intermittent claudication (HCC)    Severe obesity (BMI 35.0-39. 9) with comorbidity (HCC)    COPD (chronic obstructive pulmonary disease) ([facility])    Personal history of tobacco use, presenting hazards to health    Intermittent spinal claudication ([facility])    Cigarette nicotine dependence in remission    Irregular heart beat    Acute metabolic encephalopathy    Chest pain    Hiatal hernia    Diabetic neuropathy ([facility])    Normocytic anemia    Chronic pain of right knee    Sleep apnea    Osteoarthritis    Encounter for long-term (current) drug use    Ischemic cardiomyopathy    Ventricular tachycardia (HCC)    VT (ventricular tachycardia) ([facility])    Coronary artery disease involving native coronary artery of native heart without angina pectoris    Benign neoplasm of colon    PAD (peripheral artery disease) ([facility])    Asthma    Orthostatic hypotension    Hyperlipidemia    S/P angioplasty with stent    DM (diabetes mellitus) type II, controlled, with peripheral vascular disorder (HCC)    Toe laceration    Obstructive sleep apnea    MINI (acute kidney injury) ([address]    Type 2 diabetes with nephropathy ([facility])    Hypoxia    Abnormal nuclear cardiac imaging test    PVC's (premature ventricular contractions)    Asbestos exposure    Sepsis ([address]    ICD (implantable cardioverter-defibrillator) in place    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 Planning to have IR aspirate the disc space at L2-L3 or L3 or L4 for culture and psoas abscess  Will need to stop plavix and change to lovenox bridge which [facility] has contacted hospitalist about. Awaiting final urine culturue  Currently on Vanc and zosyn  ID consulted  History of serratia septicemia   Continue monitoring labs to see response to abx. If patient doesn't respond to abx,there may be indication for surgical intervention  We will continue to follow conservatively for now.      Signed By: ABDOUL Jo

## 2022-07-31 NOTE — PROGRESS NOTES
Hospitalist Progress Note   Admit Date:  2022  3:41 PM   Name:  Apollo Florentino   Age:  65 y.o. Sex:  male  :  1945   MRN:  [id number]   Room:  [facility]/      Reason(s) for Admission: Back pain [M54.9]  General weakness [R53.1]  Acute cystitis without hematuria [N30.00]  Low back pain without sciatica, unspecified back pain laterality, unspecified chronicity [M54.50]     Hospital Course & Interval History:   Apollo Florentino is a 65 y.o. male with medical history of ICM with ICD/PPM, EF 30-35% afib on eliquis, DM2, obesity, PAD, anemia, COPD/ asthma, CAD, SHERI on CPAP with recent L2-4 kyphoplasty evaluated with diffuse  bilateral LE weakness. He states this has been worse since his kyphoplasty and he has had several falls. Lives with wife Palomo Multani. Seen at ortho spine today and ordered MRI Lspine to evaluate ongoing pain and weakness. Did have negative bone biopsy with kyphoplasty. Felt worse and not better post procedure. Unable to ambulate well, using walker, has trouble getting up from seated position. No LE paresthesia and no urine issues excluding some issues starting stream. Had some loose BM and can't make it to the bathroom in time. No fever. No dysuria. Urine is darker. Has ICD/PPM- will need clearance for MRI. CT Lspine shows \"  Postoperative changes from kyphoplasty procedure at L2 and L4. No lumbar spine   fracture or malalignment aside from the compression deformities. Multiple levels   of probable spinal canal stenosis as described on recent MRI. \"     UA positive. CXR with improved infiltrates. COVID negative. Wife Palomo Multani [phone], or [phone]     He is a DNI       Subjective/24hr Events (22): Patient is seen and examined at the bedside. He is sitting comfortably on the chair and saturating well on room air. He is hard of hearing. He is complaining of severe back pain.  H. Patient denies chest pain, shortness of breath, nausea, vomiting, diarrhea. Assessment & Plan:     Back pain  MRI brain no acute infarct   MRI lumbar and cervical spine showed epidural, psoas abscess and osteomyelitis. CRP is elevated 12.4  Ortho following  Neurology signed off  Pain management with Percocet  PT,OT recommended STR     Newly diagnosed Epidural and psoas abscess  N.p.o. after midnight  Follow-up with blood cultures   Plan to start antibiotics after draining the abscess  Eliquis and Plavix is discontinued for the anticipation of procedures  IR was consulted for draining the abscess  ID is following, appreciate recommendations     Acute Osteomyelitis  MRI showed osteomyelitis  Anticipate 6 weeks of therapy      Acute cystitis without hematuria  Rocephin is stopped    urine culture showing  more than 50k gram-negative rods and awaiting sensitivities          DNI (do not intubate)  Noted and discussed       Elevated liver enzymes  Trend lab      Anemia  HGB 10.2, stable   Stable, chronic range for him  Trend HGB     Hypertension  Most likely due to pain  Norvasc is added    Urinary retention  S/p Tomas  Added Flomax      Hyponatremia  Resolved     Ischemic cardiomyopathy  Plan:    Ventricular tachycardia ([address]  Plan:    Coronary artery disease involving native coronary artery of native heart without angina pectoris  Plan:    PAD (peripheral artery disease) ([address]  Plan: Active Problems:    ICD (implantable cardioverter-defibrillator) in place  Plan:     Atrial fibrillation ([address]  Plan:   Eliquis and Plavix is discontinued for the anticipation of procedures  Patient is on heparin drip  Continue  amiodarone, statin      Severe obesity (BMI 35.0-39. 9) with comorbidity ([address]  Plan:   Needs modification       COPD (chronic obstructive pulmonary disease) ([facility])  Plan:   Resume inhalers       DM (diabetes mellitus) type II, controlled, with peripheral vascular disorder ([address]  Plan:   SSI       Obstructive sleep apnea  Plan:   Resume CPAP       Dispo/Discharge Planning: Anticipating hospital stay for 3 to 5 days       Diet:  ADULT DIET; Regular; 3 carb choices (45 gm/meal); Low Fat/Low Chol/High Fiber/ANGELA  ADULT ORAL NUTRITION SUPPLEMENT; Breakfast, Lunch, Dinner; Diabetic Oral Supplement  DVT Ppx Heparin drip  Code status: Limited    Hospital Problems             Last Modified POA    * (Principal) Back pain 7/27/2022 Yes    ICD (implantable cardioverter-defibrillator) in place 7/27/2022 Yes    General weakness 7/27/2022 Yes    Acute cystitis without hematuria 7/27/2022 Yes    Low back pain without sciatica 7/27/2022 Yes    DNI (do not intubate) (Chronic) 7/27/2022 Yes    UTI (urinary tract infection) 7/27/2022 Yes    Elevated liver enzymes 7/27/2022 Yes    Anemia (Chronic) 7/27/2022 Yes    Hyponatremia 7/27/2022 Yes    Atrial fibrillation ([address] 7/27/2022 Yes    Severe obesity (BMI 35.0-39. 9) with comorbidity ([address] 7/27/2022 Yes    COPD (chronic obstructive pulmonary disease) ([address] 7/27/2022 Yes    Intermittent spinal claudication ([address] 7/27/2022 Yes    Ischemic cardiomyopathy 7/27/2022 Yes    Ventricular tachycardia ([address] 7/27/2022 Yes    Coronary artery disease involving native coronary artery of native heart without angina pectoris 7/27/2022 Yes    PAD (peripheral artery disease) ([address] 7/27/2022 Yes    Asthma 7/27/2022 Yes    DM (diabetes mellitus) type II, controlled, with peripheral vascular disorder ([address] 7/27/2022 Yes    Obstructive sleep apnea 7/27/2022 Yes       Objective:   Patient Vitals for the past 24 hrs:   Temp Pulse Resp BP SpO2   07/31/22 0941 -- 58 16 -- 94 %   07/31/22 0720 98.2 °F (36.8 °C) 62 20 (!) 166/79 93 %   07/31/22 0356 98.7 °F (37.1 °C) 67 16 (!) 164/84 (!) 86 %   07/30/22 2309 99.1 °F (37.3 °C) 63 17 (!) 156/78 92 %   07/30/22 1937 -- -- -- -- 92 %   07/30/22 1925 98.4 °F (36.9 °C) 63 17 (!) 162/69 92 %       Estimated body mass index is 34.3 kg/m² as calculated from the following:    Height as of this encounter: 6' 1\" (1.854 m).     Weight as of 7/28/22: 260 lb (117.9 kg). Intake/Output Summary (Last 24 hours) at 7/31/2022 1136  Last data filed at 7/31/2022 0915  Gross per 24 hour   Intake 1097 ml   Output 1150 ml   Net -53 ml         Physical Exam:     Blood pressure (!) 166/79, pulse 58, temperature 98.2 °F (36.8 °C), temperature source Oral, resp. rate 16, height 6' 1\" (1.854 m), weight 260 lb (117.9 kg), SpO2 94 %. General:    Well nourished. No overt distress, morbidly obese  Head:  Normocephalic, atraumatic  Eyes:  Sclerae appear normal. Pupils equally round. ENT:  Nares appear normal, no drainage. Moist oral mucosa  Neck:  No restricted ROM. Trachea midline. CV:   RRR. No m/r/g. No jugular venous distension. Lungs:   CTAB. No wheezing, rhonchi, or rales. Respirations even, unlabored. Abdomen: Bowel sounds present. Soft, nontender, nondistended. Extremities: No cyanosis or clubbing. No edema. Tenderness of the back is present. Skin:     No rashes and normal coloration. Warm and dry. Neuro:  CN II-XII grossly intact. Sensation intact. A&Ox3  Psych:  Normal mood and affect. I have reviewed ordered lab tests and independently visualized imaging below:    Recent Labs:  Recent Results (from the past 48 hour(s))   POCT Glucose    Collection Time: 07/29/22 12:17 PM   Result Value Ref Range    POC Glucose 196 (H) 65 - 100 mg/dL    Performed by: [place]    POCT Glucose    Collection Time: 07/29/22  4:16 PM   Result Value Ref Range    POC Glucose 177 (H) 65 - 100 mg/dL    Performed by: Hussein Mccarthy.     POCT Glucose    Collection Time: 07/29/22  9:04 PM   Result Value Ref Range    POC Glucose 207 (H) 65 - 100 mg/dL    Performed by: [facility]rien [facility] PPD TEST IN 48 HRS    Collection Time: 07/29/22 11:39 PM   Result Value Ref Range    PPD, (POC) Negative Negative    mm Induration 0 0 - 5 mm   CBC with Auto Differential    Collection Time: 07/30/22  7:15 AM   Result Value Ref Range WBC 7.4 4.3 - 11.1 K/uL    RBC 3.06 (L) 4.23 - 5.6 M/uL    Hemoglobin 9.8 (L) 13.6 - 17.2 g/dL    Hematocrit 29.4 (L) 41.1 - 50.3 %    MCV 96.1 79.6 - 97.8 FL    MCH 32.0 26.1 - 32.9 PG    MCHC 33.3 31.4 - 35.0 g/dL    RDW 14.5 11.9 - 14.6 %    Platelets 138 255 - 211 K/uL    MPV 9.9 9.4 - 12.3 FL    nRBC 0.00 0.0 - 0.2 K/uL    Differential Type AUTOMATED      Seg Neutrophils 82 (H) 43 - 78 %    Lymphocytes 9 (L) 13 - 44 %    Monocytes 8 4.0 - 12.0 %    Eosinophils % 0 (L) 0.5 - 7.8 %    Basophils 0 0.0 - 2.0 %    Immature Granulocytes 1 0.0 - 5.0 %    Segs Absolute 6.0 1.7 - 8.2 K/UL    Absolute Lymph # 0.7 0.5 - 4.6 K/UL    Absolute Mono # 0.6 0.1 - 1.3 K/UL    Absolute Eos # 0.0 0.0 - 0.8 K/UL    Basophils Absolute 0.0 0.0 - 0.2 K/UL    Absolute Immature Granulocyte 0.1 0.0 - 0.5 K/UL   Basic Metabolic Panel w/ Reflex to MG    Collection Time: 07/30/22  7:15 AM   Result Value Ref Range    Sodium 132 (L) 138 - 145 mmol/L    Potassium 3.3 (L) 3.5 - 5.1 mmol/L    Chloride 96 (L) 98 - 107 mmol/L    CO2 31 21 - 32 mmol/L    Anion Gap 5 (L) 7 - 16 mmol/L    Glucose 160 (H) 65 - 100 mg/dL    BUN 7 (L) 8 - 23 MG/DL    Creatinine 0.70 (L) 0.8 - 1.5 MG/DL    GFR African American >60 >60 ml/min/1.73m2    GFR Non- >60 >60 ml/min/1.73m2    Calcium 8.7 8.3 - 10.4 MG/DL   Phosphorus    Collection Time: 07/30/22  7:15 AM   Result Value Ref Range    Phosphorus 2.6 2.3 - 3.7 MG/DL   Magnesium    Collection Time: 07/30/22  7:15 AM   Result Value Ref Range    Magnesium 1.8 1.8 - 2.4 mg/dL   POCT Glucose    Collection Time: 07/30/22  7:46 AM   Result Value Ref Range    POC Glucose 162 (H) 65 - 100 mg/dL    Performed by:  Jes    APTT    Collection Time: 07/30/22  9:53 AM   Result Value Ref Range    PTT 36.6 (H) 24.1 - 35.1 SEC   Protime-INR    Collection Time: 07/30/22  9:53 AM   Result Value Ref Range    Protime 16.3 (H) 12.6 - 14.5 sec    INR 1.3     POCT Glucose    Collection Time: 07/30/22 11:36 AM 4.1 cm    RV Basal Dimension 4.6 cm    TAPSE 2.4 1.7 cm    TR Max Velocity 2.17 m/s    TR Peak Gradient 19 mmHg    Body Surface Area 2.46 m2    Fractional Shortening 2D 25 28 - 44 %    LV ESV Index A4C 54 mL/m2    LV EDV Index A4C 117 mL/m2    LV ESV Index A2C 56 mL/m2    LV EDV Index A2C 106 mL/m2    LVIDd Index 2.78 cm/m2    LVIDs Index 2.07 cm/m2    LV RWT Ratio 0.30     LV Mass 2D 279.6 (A) 88 - 224 g    LV Mass 2D Index 116.0 (A) 49 - 115 g/m2    MV E/A 1.33     E/E' Ratio (Averaged) 18.48     E/E' Lateral 17.25     E/E' Septal 19.71     LA Volume Index BP 34 16 - 34 ml/m2    LVOT Stroke Volume Index 52.5 mL/m2    LA Size Index 1.87 cm/m2    LA/AO Root Ratio 1.15     Ao Root Index 1.62 cm/m2    Ascending Aorta Index 1.54 cm/m2    AV Velocity Ratio 0.61     LVOT:AV VTI Index 0.61     CORI/BSA VTI 1.2 cm2/m2    CORI/BSA Peak Velocity 1.3 cm2/m2    MV:LVOT VTI Index 1.83     RVSP 27 mmHg   POCT Glucose    Collection Time: 07/30/22  4:21 PM   Result Value Ref Range    POC Glucose 208 (H) 65 - 100 mg/dL    Performed by: Sarah.     APTT    Collection Time: 07/30/22  5:07 PM   Result Value Ref Range    PTT 41.8 (H) 24.1 - 35.1 SEC   POCT Glucose    Collection Time: 07/30/22  8:25 PM   Result Value Ref Range    POC Glucose 174 (H) 65 - 100 mg/dL    Performed by: Lesley Saenz    APTT    Collection Time: 07/31/22 12:29 AM   Result Value Ref Range    PTT 50.0 (H) 24.1 - 35.1 SEC   CBC with Auto Differential    Collection Time: 07/31/22  7:30 AM   Result Value Ref Range    WBC 7.4 4.3 - 11.1 K/uL    RBC 2.90 (L) 4.23 - 5.6 M/uL    Hemoglobin 9.1 (L) 13.6 - 17.2 g/dL    Hematocrit 27.6 (L) 41.1 - 50.3 %    MCV 95.2 79.6 - 97.8 FL    MCH 31.4 26.1 - 32.9 PG    MCHC 33.0 31.4 - 35.0 g/dL    RDW 14.6 11.9 - 14.6 %    Platelets 140 838 - 566 K/uL    MPV 10.2 9.4 - 12.3 FL    nRBC 0.00 0.0 - 0.2 K/uL    Differential Type AUTOMATED      Seg Neutrophils 78 43 - 78 %    Lymphocytes 12 (L) 13 - 44 %    Monocytes 8 4.0 - techniques were used for this study. Our CT scanners use one or all of the following: Automated exposure control, adjustment of the mA and/or kV according to patient size, iterative reconstruction. FINDINGS: Patient is status post interval kyphoplasty procedure at L2 and L4 when compared to prior MRI. Mild compression fractures are noted at these levels. No retropulsed fragments noted in the spinal canal. Multilevel degenerative disc changes are present. No malalignment. Areas of spinal canal stenosis suspected as described on recent prior MRI. Postoperative changes from kyphoplasty procedure at L2 and L4. No lumbar spine fracture or malalignment aside from the compression deformities. Multiple levels of probable spinal canal stenosis as described on recent MRI. XR CHEST PORTABLE    Result Date: 7/27/2022  XR CHEST PORTABLE 7/27/2022 6:35 PM HISTORY: Weakness. COMPARISON: Chest x-ray 3/7/2022. A portable AP view of the chest was obtained. Pulmonary infiltrate predominantly in the right upper lobe and mid to lower left lung has mostly cleared. Atelectasis or scarring right lung base appears stable. Stable cardiomegaly. Implantable cardiac device left chest and both leads are unchanged. No pneumothorax. No pleural effusions.        Current Meds:  Current Facility-Administered Medications   Medication Dose Route Frequency    amLODIPine (NORVASC) tablet 10 mg  10 mg Oral Daily    heparin (porcine) injection 4,000 Units  4,000 Units IntraVENous PRN    heparin (porcine) injection 2,000 Units  2,000 Units IntraVENous PRN    heparin 25,000 units in dextrose 5% 250 mL (premix) infusion  5-30 Units/kg/hr IntraVENous Continuous    albuterol sulfate HFA (PROVENTIL;VENTOLIN;PROAIR) 108 (90 Base) MCG/ACT inhaler 2 puff  2 puff Inhalation Q4H PRN    amiodarone (CORDARONE) tablet 200 mg  200 mg Oral BID    [Held by provider] apixaban (ELIQUIS) tablet 5 mg  5 mg Oral 2 times per day    calcitonin (MIACALCIN) nasal spray 1 spray  1 spray Alternating Nares Daily    carvedilol (COREG) tablet 25 mg  25 mg Oral BID WC    [Held by provider] clopidogrel (PLAVIX) tablet 75 mg  75 mg Oral Daily    fluticasone (FLONASE) 50 MCG/ACT nasal spray 2 spray  2 spray Each Nostril Daily    mometasone-formoterol (DULERA) 200-5 MCG/ACT inhaler 2 puff  2 puff Inhalation BID    furosemide (LASIX) tablet 20 mg  20 mg Oral Daily    latanoprost (XALATAN) 0.005 % ophthalmic solution 1 drop  1 drop Ophthalmic Daily    montelukast (SINGULAIR) tablet 10 mg  10 mg Oral Nightly    [Held by provider] rosuvastatin (CRESTOR) tablet 10 mg  10 mg Oral Daily    valsartan (DIOVAN) tablet 320 mg  320 mg Oral Daily    sodium chloride flush 0.9 % injection 5-40 mL  5-40 mL IntraVENous 2 times per day    sodium chloride flush 0.9 % injection 5-40 mL  5-40 mL IntraVENous PRN    0.9 % sodium chloride infusion   IntraVENous PRN    ondansetron (ZOFRAN-ODT) disintegrating tablet 4 mg  4 mg Oral Q8H PRN    Or    ondansetron (ZOFRAN) injection 4 mg  4 mg IntraVENous Q6H PRN    polyethylene glycol (GLYCOLAX) packet 17 g  17 g Oral Daily PRN    acetaminophen (TYLENOL) tablet 650 mg  650 mg Oral Q6H PRN    Or    acetaminophen (TYLENOL) suppository 650 mg  650 mg Rectal Q6H PRN    glucose chewable tablet 16 g  4 tablet Oral PRN    dextrose bolus 10% 125 mL  125 mL IntraVENous PRN    Or    dextrose bolus 10% 250 mL  250 mL IntraVENous PRN    glucagon (rDNA) injection 1 mg  1 mg SubCUTAneous PRN    dextrose 10 % infusion   IntraVENous Continuous PRN    insulin lispro (HUMALOG) injection vial 0-4 Units  0-4 Units SubCUTAneous TID WC    insulin lispro (HUMALOG) injection vial 0-4 Units  0-4 Units SubCUTAneous Nightly     Facility-Administered Medications Ordered in Other Encounters   Medication Dose Route Frequency    gadoteridol (PROHANCE) injection 23 mL  23 mL IntraVENous ONCE PRN    sodium chloride flush 0.9 % injection 10 mL  10 mL IntraVENous PRN       Signed:  Shayne Baez, MD    Part of this note may have been written by using a voice dictation software. The note has been proof read but may still contain some grammatical/other typographical errors.

## 2022-08-01 ENCOUNTER — APPOINTMENT (OUTPATIENT)
Dept: CT IMAGING | Age: 77
DRG: 371 | End: 2022-08-01
Payer: MEDICARE

## 2022-08-01 LAB
ALBUMIN SERPL-MCNC: 2.1 G/DL (ref 3.2–4.6)
ALBUMIN/GLOB SERPL: 0.5 {RATIO} (ref 1.2–3.5)
ALP SERPL-CCNC: 140 U/L (ref 50–136)
ALT SERPL-CCNC: 233 U/L (ref 12–65)
ANION GAP SERPL CALC-SCNC: 1 MMOL/L (ref 7–16)
APTT PPP: 33.8 SEC (ref 24.1–35.1)
APTT PPP: 68.9 SEC (ref 24.1–35.1)
APTT PPP: 72.5 SEC (ref 24.1–35.1)
AST SERPL-CCNC: 70 U/L (ref 15–37)
BASOPHILS # BLD: 0 K/UL (ref 0–0.2)
BASOPHILS NFR BLD: 0 % (ref 0–2)
BILIRUB SERPL-MCNC: 0.6 MG/DL (ref 0.2–1.1)
BUN SERPL-MCNC: 7 MG/DL (ref 8–23)
CALCIUM SERPL-MCNC: 8.7 MG/DL (ref 8.3–10.4)
CHLORIDE SERPL-SCNC: 96 MMOL/L (ref 98–107)
CO2 SERPL-SCNC: 33 MMOL/L (ref 21–32)
CREAT SERPL-MCNC: 0.6 MG/DL (ref 0.8–1.5)
CRP SERPL-MCNC: 8.9 MG/DL (ref 0–0.9)
DIFFERENTIAL METHOD BLD: ABNORMAL
EOSINOPHIL # BLD: 0 K/UL (ref 0–0.8)
EOSINOPHIL NFR BLD: 0 % (ref 0.5–7.8)
ERYTHROCYTE [DISTWIDTH] IN BLOOD BY AUTOMATED COUNT: 14.4 % (ref 11.9–14.6)
ERYTHROCYTE [SEDIMENTATION RATE] IN BLOOD: 42 MM/HR
GLOBULIN SER CALC-MCNC: 4 G/DL (ref 2.3–3.5)
GLUCOSE BLD STRIP.AUTO-MCNC: 172 MG/DL (ref 65–100)
GLUCOSE BLD STRIP.AUTO-MCNC: 178 MG/DL (ref 65–100)
GLUCOSE BLD STRIP.AUTO-MCNC: 196 MG/DL (ref 65–100)
GLUCOSE BLD STRIP.AUTO-MCNC: 209 MG/DL (ref 65–100)
GLUCOSE SERPL-MCNC: 171 MG/DL (ref 65–100)
HCT VFR BLD AUTO: 27.6 % (ref 41.1–50.3)
HGB BLD-MCNC: 9.1 G/DL (ref 13.6–17.2)
IMM GRANULOCYTES # BLD AUTO: 0.2 K/UL (ref 0–0.5)
IMM GRANULOCYTES NFR BLD AUTO: 2 % (ref 0–5)
LYMPHOCYTES # BLD: 0.9 K/UL (ref 0.5–4.6)
LYMPHOCYTES NFR BLD: 12 % (ref 13–44)
MCH RBC QN AUTO: 31.3 PG (ref 26.1–32.9)
MCHC RBC AUTO-ENTMCNC: 33 G/DL (ref 31.4–35)
MCV RBC AUTO: 94.8 FL (ref 79.6–97.8)
MONOCYTES # BLD: 0.6 K/UL (ref 0.1–1.3)
MONOCYTES NFR BLD: 8 % (ref 4–12)
NEUTS SEG # BLD: 6 K/UL (ref 1.7–8.2)
NEUTS SEG NFR BLD: 78 % (ref 43–78)
NRBC # BLD: 0 K/UL (ref 0–0.2)
PHOSPHATE SERPL-MCNC: 3 MG/DL (ref 2.3–3.7)
PLATELET # BLD AUTO: 264 K/UL (ref 150–450)
PMV BLD AUTO: 10.3 FL (ref 9.4–12.3)
POTASSIUM SERPL-SCNC: 3.6 MMOL/L (ref 3.5–5.1)
PROT SERPL-MCNC: 6.1 G/DL (ref 6.3–8.2)
RBC # BLD AUTO: 2.91 M/UL (ref 4.23–5.6)
SERVICE CMNT-IMP: ABNORMAL
SODIUM SERPL-SCNC: 130 MMOL/L (ref 136–145)
WBC # BLD AUTO: 7.7 K/UL (ref 4.3–11.1)

## 2022-08-01 PROCEDURE — 6360000002 HC RX W HCPCS: Performed by: STUDENT IN AN ORGANIZED HEALTH CARE EDUCATION/TRAINING PROGRAM

## 2022-08-01 PROCEDURE — 97112 NEUROMUSCULAR REEDUCATION: CPT

## 2022-08-01 PROCEDURE — 36415 COLL VENOUS BLD VENIPUNCTURE: CPT

## 2022-08-01 PROCEDURE — 97530 THERAPEUTIC ACTIVITIES: CPT

## 2022-08-01 PROCEDURE — 99223 1ST HOSP IP/OBS HIGH 75: CPT | Performed by: INTERNAL MEDICINE

## 2022-08-01 PROCEDURE — 85730 THROMBOPLASTIN TIME PARTIAL: CPT

## 2022-08-01 PROCEDURE — 77012 CT SCAN FOR NEEDLE BIOPSY: CPT

## 2022-08-01 PROCEDURE — 2580000003 HC RX 258: Performed by: INTERNAL MEDICINE

## 2022-08-01 PROCEDURE — 85025 COMPLETE CBC W/AUTO DIFF WBC: CPT

## 2022-08-01 PROCEDURE — 6370000000 HC RX 637 (ALT 250 FOR IP): Performed by: STUDENT IN AN ORGANIZED HEALTH CARE EDUCATION/TRAINING PROGRAM

## 2022-08-01 PROCEDURE — 80053 COMPREHEN METABOLIC PANEL: CPT

## 2022-08-01 PROCEDURE — 0K9G3ZZ DRAINAGE OF LEFT TRUNK MUSCLE, PERCUTANEOUS APPROACH: ICD-10-PCS | Performed by: RADIOLOGY

## 2022-08-01 PROCEDURE — 97110 THERAPEUTIC EXERCISES: CPT

## 2022-08-01 PROCEDURE — 94760 N-INVAS EAR/PLS OXIMETRY 1: CPT

## 2022-08-01 PROCEDURE — 6370000000 HC RX 637 (ALT 250 FOR IP): Performed by: INTERNAL MEDICINE

## 2022-08-01 PROCEDURE — 87205 SMEAR GRAM STAIN: CPT

## 2022-08-01 PROCEDURE — 6360000002 HC RX W HCPCS: Performed by: RADIOLOGY

## 2022-08-01 PROCEDURE — 94640 AIRWAY INHALATION TREATMENT: CPT

## 2022-08-01 PROCEDURE — 85652 RBC SED RATE AUTOMATED: CPT

## 2022-08-01 PROCEDURE — 82962 GLUCOSE BLOOD TEST: CPT

## 2022-08-01 PROCEDURE — 86140 C-REACTIVE PROTEIN: CPT

## 2022-08-01 PROCEDURE — 1100000000 HC RM PRIVATE

## 2022-08-01 PROCEDURE — 84100 ASSAY OF PHOSPHORUS: CPT

## 2022-08-01 PROCEDURE — 51702 INSERT TEMP BLADDER CATH: CPT

## 2022-08-01 RX ORDER — MORPHINE SULFATE 15 MG/1
15 TABLET, FILM COATED, EXTENDED RELEASE ORAL EVERY 12 HOURS SCHEDULED
Status: DISCONTINUED | OUTPATIENT
Start: 2022-08-01 | End: 2022-08-06

## 2022-08-01 RX ORDER — MORPHINE SULFATE 2 MG/ML
1 INJECTION, SOLUTION INTRAMUSCULAR; INTRAVENOUS EVERY 4 HOURS PRN
Status: DISCONTINUED | OUTPATIENT
Start: 2022-08-01 | End: 2022-08-03

## 2022-08-01 RX ORDER — HEPARIN SODIUM 1000 [USP'U]/ML
2000 INJECTION, SOLUTION INTRAVENOUS; SUBCUTANEOUS ONCE
Status: DISCONTINUED | OUTPATIENT
Start: 2022-08-01 | End: 2022-08-08 | Stop reason: HOSPADM

## 2022-08-01 RX ORDER — MIDAZOLAM HYDROCHLORIDE 2 MG/2ML
INJECTION, SOLUTION INTRAMUSCULAR; INTRAVENOUS
Status: COMPLETED | OUTPATIENT
Start: 2022-08-01 | End: 2022-08-01

## 2022-08-01 RX ORDER — OXYCODONE HYDROCHLORIDE AND ACETAMINOPHEN 5; 325 MG/1; MG/1
2 TABLET ORAL EVERY 4 HOURS PRN
Status: DISCONTINUED | OUTPATIENT
Start: 2022-08-01 | End: 2022-08-03

## 2022-08-01 RX ORDER — AMIODARONE HYDROCHLORIDE 200 MG/1
200 TABLET ORAL DAILY
Status: DISCONTINUED | OUTPATIENT
Start: 2022-08-02 | End: 2022-08-08 | Stop reason: HOSPADM

## 2022-08-01 RX ORDER — LISINOPRIL 5 MG/1
5 TABLET ORAL DAILY
Status: DISCONTINUED | OUTPATIENT
Start: 2022-08-01 | End: 2022-08-01

## 2022-08-01 RX ORDER — SODIUM CHLORIDE 9 MG/ML
INJECTION, SOLUTION INTRAVENOUS CONTINUOUS
Status: DISCONTINUED | OUTPATIENT
Start: 2022-08-01 | End: 2022-08-01

## 2022-08-01 RX ORDER — FENTANYL CITRATE 50 UG/ML
INJECTION, SOLUTION INTRAMUSCULAR; INTRAVENOUS
Status: COMPLETED | OUTPATIENT
Start: 2022-08-01 | End: 2022-08-01

## 2022-08-01 RX ADMIN — MOMETASONE FUROATE AND FORMOTEROL FUMARATE DIHYDRATE 2 PUFF: 200; 5 AEROSOL RESPIRATORY (INHALATION) at 06:04

## 2022-08-01 RX ADMIN — HEPARIN SODIUM 22 UNITS/KG/HR: 10000 INJECTION, SOLUTION INTRAVENOUS at 04:03

## 2022-08-01 RX ADMIN — HEPARIN SODIUM 2000 UNITS: 1000 INJECTION INTRAVENOUS; SUBCUTANEOUS at 00:56

## 2022-08-01 RX ADMIN — MOMETASONE FUROATE AND FORMOTEROL FUMARATE DIHYDRATE 2 PUFF: 200; 5 AEROSOL RESPIRATORY (INHALATION) at 19:36

## 2022-08-01 RX ADMIN — AMIODARONE HYDROCHLORIDE 200 MG: 200 TABLET ORAL at 08:37

## 2022-08-01 RX ADMIN — MIDAZOLAM HYDROCHLORIDE 1 MG: 1 INJECTION, SOLUTION INTRAMUSCULAR; INTRAVENOUS at 15:11

## 2022-08-01 RX ADMIN — CARVEDILOL 25 MG: 25 TABLET, FILM COATED ORAL at 16:40

## 2022-08-01 RX ADMIN — CALCITONIN SALMON 1 SPRAY: 200 SPRAY, METERED NASAL at 09:56

## 2022-08-01 RX ADMIN — OXYCODONE AND ACETAMINOPHEN 2 TABLET: 5; 325 TABLET ORAL at 13:05

## 2022-08-01 RX ADMIN — VALSARTAN 320 MG: 320 TABLET, FILM COATED ORAL at 08:37

## 2022-08-01 RX ADMIN — LATANOPROST 1 DROP: 50 SOLUTION OPHTHALMIC at 08:40

## 2022-08-01 RX ADMIN — TAMSULOSIN HYDROCHLORIDE 0.4 MG: 0.4 CAPSULE ORAL at 08:37

## 2022-08-01 RX ADMIN — SODIUM CHLORIDE, PRESERVATIVE FREE 10 ML: 5 INJECTION INTRAVENOUS at 21:40

## 2022-08-01 RX ADMIN — FENTANYL CITRATE 50 MCG: 50 INJECTION INTRAMUSCULAR; INTRAVENOUS at 15:11

## 2022-08-01 RX ADMIN — MONTELUKAST 10 MG: 10 TABLET, FILM COATED ORAL at 21:39

## 2022-08-01 RX ADMIN — MORPHINE SULFATE 15 MG: 15 TABLET, FILM COATED, EXTENDED RELEASE ORAL at 21:39

## 2022-08-01 RX ADMIN — SODIUM CHLORIDE, PRESERVATIVE FREE 10 ML: 5 INJECTION INTRAVENOUS at 08:40

## 2022-08-01 RX ADMIN — FLUTICASONE PROPIONATE 2 SPRAY: 50 SPRAY, METERED NASAL at 08:39

## 2022-08-01 RX ADMIN — CARVEDILOL 25 MG: 25 TABLET, FILM COATED ORAL at 08:37

## 2022-08-01 RX ADMIN — FUROSEMIDE 20 MG: 20 TABLET ORAL at 08:37

## 2022-08-01 ASSESSMENT — PAIN SCALES - GENERAL
PAINLEVEL_OUTOF10: 10
PAINLEVEL_OUTOF10: 0
PAINLEVEL_OUTOF10: 0
PAINLEVEL_OUTOF10: 6
PAINLEVEL_OUTOF10: 6
PAINLEVEL_OUTOF10: 4

## 2022-08-01 ASSESSMENT — ENCOUNTER SYMPTOMS
BACK PAIN: 1
RESPIRATORY NEGATIVE: 1
NAUSEA: 0
HEARTBURN: 0
VOMITING: 0
EYES NEGATIVE: 1

## 2022-08-01 ASSESSMENT — PAIN DESCRIPTION - ORIENTATION
ORIENTATION: MID
ORIENTATION: MID

## 2022-08-01 ASSESSMENT — PAIN DESCRIPTION - LOCATION
LOCATION: BACK

## 2022-08-01 ASSESSMENT — PAIN DESCRIPTION - DESCRIPTORS
DESCRIPTORS: ACHING
DESCRIPTORS: ACHING

## 2022-08-01 ASSESSMENT — PAIN - FUNCTIONAL ASSESSMENT
PAIN_FUNCTIONAL_ASSESSMENT: 0-10
PAIN_FUNCTIONAL_ASSESSMENT: ACTIVITIES ARE NOT PREVENTED

## 2022-08-01 NOTE — PROGRESS NOTES
PHYSICAL THERAPY Daily Note and AM  (Link to Caseload Tracking: PT Visit Days : 2  Time In/Out PT Charge Capture  Rehab Caseload Tracker  Orders      Paula Leblanc is a 65 y.o. male   PRIMARY DIAGNOSIS: Back pain  Back pain [M54.9]  General weakness [R53.1]  Acute cystitis without hematuria [N30.00]  Low back pain without sciatica, unspecified back pain laterality, unspecified chronicity [M54.50]       Inpatient: Payor: MEDICARE / Plan: MEDICARE PART A AND B / Product Type: *No Product type* /     ASSESSMENT:     REHAB RECOMMENDATIONS:   Recommendation to date pending progress:  Setting:  Short-term Rehab    Equipment:    To Be Determined     ASSESSMENT:  Mr. Judd Mcintosh presents supine in bed and agreeable to PT/OT treatment. He continues to be limited by pain and needs verbal and tactile cues to initiate the log roll. Pt is mod A for bed mobility due to increased pain and once seated EOB needs additional time to adjust. He continues to need the bed raised and is min A x2 for s2s and SPT to the chair with very short steps, forward flexion and decreased overall stability. Once seated he alternates UE/LE therex. Slight improvements in activity tolerance this date and slow progress towards goals. PT will continue to follow.       SUBJECTIVE:   Mr. Judd Mcintosh states, \"I have to have a procedure later\"     Social/Functional Lives With: Spouse  Type of Home: House  Home Layout: Two level  Bathroom Toilet: Standard  Bathroom Equipment: Toilet raiser, Grab bars in shower  Bathroom Accessibility: Accessible  Home Equipment: Rufina Burrell, [address] Help From: Family  ADL Assistance: Independent  Homemaking Assistance: Independent  Ambulation Assistance: Independent  Transfer Assistance: Independent  Active : Yes  Mode of Transportation: Car  Occupation: Retired  OBJECTIVE:     PAIN: VITALS / O2: PRECAUTION / Payam Lyme / DRAINS:   Pre Treatment:   Pain Assessment: 0-10  Pain Level: 6      Post Treatment: 6/10 Vitals Oxygen    Tomas Catheter    RESTRICTIONS/PRECAUTIONS:        MOBILITY: I Mod I S SBA CGA Min Mod Max Total  NT x2 Comments:   Bed Mobility    Rolling [] [] [] [] [] [] [x] [] [] [] []    Supine to Sit [] [] [] [] [] [] [x] [] [] [] []    Scooting [] [] [] [] [] [] [x] [] [] [] []    Sit to Supine [] [] [] [] [] [] [] [] [] [x] []    Transfers    Sit to Stand [] [] [] [] [] [x] [] [] [] [] [x]    Bed to Chair [] [] [] [] [] [x] [] [] [] [] [x] With SPT   Stand to Sit [] [] [] [] [] [x] [] [] [] [] [x]     [] [] [] [] [] [] [] [] [] [] []    I=Independent, Mod I=Modified Independent, S=Supervision, SBA=Standby Assistance, CGA=Contact Guard Assistance,   Min=Minimal Assistance, Mod=Moderate Assistance, Max=Maximal Assistance, Total=Total Assistance, NT=Not Tested    BALANCE: Good Fair+ Fair Fair- Poor NT Comments   Sitting Static [] [x] [] [] [] []    Sitting Dynamic [] [] [x] [] [] []              Standing Static [] [] [x] [] [] []    Standing Dynamic [] [] [] [x] [x] []      GAIT: I Mod I S SBA CGA Min Mod Max Total  NT x2 Comments:   Level of Assistance [] [] [] [] [] [] [] [] [] [x] []    Distance   feet    DME N/A    Gait Quality N/A    Weightbearing Status      Stairs      I=Independent, Mod I=Modified Independent, S=Supervision, SBA=Standby Assistance, CGA=Contact Guard Assistance,   Min=Minimal Assistance, Mod=Moderate Assistance, Max=Maximal Assistance, Total=Total Assistance, NT=Not Tested    PLAN:   ACUTE PHYSICAL THERAPY GOALS:   (Developed with and agreed upon by patient and/or caregiver.)  (1.)Mr. Sal Duarte will move from supine to sit and sit to supine , scoot up and down, and roll side to side in bed using log roll technique with MODIFIED INDEPENDENCE within 7 treatment day(s). (2.)Mr. Sal Duarte will transfer from bed to chair and chair to bed with SUPERVISION using the least restrictive device within 7 treatment day(s). (3.)Mr. Sal Duarte will ambulate with STAND BY ASSIST for a minimum of 75 feet with the least restrictive device within 7 treatment day(s). FREQUENCY AND DURATION: 3 times/week for duration of hospital stay or until stated goals are met, whichever comes first.    TREATMENT:   TREATMENT:   Co-Treatment PT/OT necessary due to patient's decreased overall endurance/tolerance levels, as well as need for high level skilled assistance to complete functional transfers/mobility and functional tasks  Therapeutic Activity (23 Minutes): Therapeutic activity included Rolling, Supine to Sit, Transfer Training, Sitting balance , Standing balance, and seated therex to improve functional Activity tolerance, Balance, Coordination, Mobility, and Strength.     TREATMENT GRID:   Date:  8/1/22 Date:   Date:     Activity/Exercise Parameters Parameters Parameters   AP x10     LAQ x10     Seated march x10                                 AFTER TREATMENT PRECAUTIONS: Bed/Chair Locked, Call light within reach, Chair, Needs within reach, RN notified, and Visitors at bedside    INTERDISCIPLINARY COLLABORATION:  RN/ PCT, PT/ PTA, and OT/ KATE    EDUCATION:      TIME IN/OUT:  Time In: 1059  Time Out: 25 [facility]  Minutes: [address] [place] [address]

## 2022-08-01 NOTE — PROGRESS NOTES
TRANSFER - IN REPORT:    Verbal report received from Naima Calabrese. RN on Kd Parrish  being received from recovery for routine progression of patient care      Report consisted of patient's Situation, Background, Assessment and   Recommendations(SBAR). Information from the following report(s) Nurse Handoff Report was reviewed with the receiving nurse. Opportunity for questions and clarification was provided. Assessment to be completed upon patient's arrival to unit and care assumed.

## 2022-08-01 NOTE — OR NURSING
Report called to Duncan BRUNA RAM RN for pt returning to room 520 s/p recovery. All questions answered.

## 2022-08-01 NOTE — BRIEF OP NOTE
Brief Postoperative Note      Patient: Yann Nascimento  YOB: 1945  MRN: [id number]    Date of Procedure: 8/1/2022    Pre-Op Diagnosis: Lumbar spine osteomyelitis    Post-Op Diagnosis: Same       CT guided aspiration of a small psoas abscess adjacent to the L2-L3 disc space on the left with removal of 3 mL of purulent fluid. Anesthesia: Moderate sedation    Estimated Blood Loss (mL): Minimal    Complications: None    Specimens:   Sent for culture    Implants:  * No implants in log *      Drains:   Urinary Catheter Tomas (Active)   $ Urethral catheter insertion $ Not inserted for procedure 08/01/22 0725   Catheter Indications Urinary retention (acute or chronic), continuous bladder irrigation or bladder outlet obstruction 08/01/22 0725   Site Assessment No urethral drainage 08/01/22 0725   Urine Color Yellow 08/01/22 0725   Urine Appearance Clear 08/01/22 0725   Urine Odor Other (Comment) 08/01/22 0725   Collection Container Standard 08/01/22 0725   Securement Method Securing device (Describe) 08/01/22 0725   Catheter Care Completed Yes 07/30/22 1925   Catheter Best Practices  Drainage tube clipped to bed;Catheter secured to thigh; Tamper seal intact; Bag below bladder;Bag not on floor; Lack of dependent loop in tubing;Drainage bag less than half full 08/01/22 0725   Status Patent;Draining 08/01/22 0725   Output (mL) 2200 mL 08/01/22 1405       Findings: As above    Electronically signed by Karin Allen MD on 8/1/2022 at 3:29 PM

## 2022-08-01 NOTE — OR NURSING
TRANSFER - OUT REPORT:       Verbal report given to Jessica Alex on Tamera Matters  being transferred to IR room 5 for routine post-op          Report consisted of patients Situation, Background, Assessment and      Recommendations(SBAR). Information from the following report(s) SBAR, Procedure Summary and MAR was reviewed with the receiving nurse. Opportunity for questions and clarification was provided. Conscious Sedation:    50 Mcg of Fentanyl administered   1 Mg of Versed administered       Pt tolerated procedure well.

## 2022-08-01 NOTE — PROGRESS NOTES
Hospitalist Progress Note   Admit Date:  2022  3:41 PM   Name:  Mehdi Khanna   Age:  65 y.o. Sex:  male  :  1945   MRN:  [id number]   Room:  [facility]/      Reason(s) for Admission: Back pain [M54.9]  General weakness [R53.1]  Acute cystitis without hematuria [N30.00]  Low back pain without sciatica, unspecified back pain laterality, unspecified chronicity [M54.50]     Hospital Course & Interval History:   Mehdi Khanna is a 65 y.o. male with medical history of ICM with ICD/PPM, EF 30-35% afib on eliquis, DM2, obesity, PAD, anemia, COPD/ asthma, CAD, SHERI on CPAP with recent L2-4 kyphoplasty evaluated with diffuse  bilateral LE weakness. He states this has been worse since his kyphoplasty and he has had several falls. Lives with wife Andrew Herbert. Seen at ortho spine today and ordered MRI Lspine to evaluate ongoing pain and weakness. Did have negative bone biopsy with kyphoplasty. Felt worse and not better post procedure. Unable to ambulate well, using walker, has trouble getting up from seated position. No LE paresthesia and no urine issues excluding some issues starting stream. Had some loose BM and can't make it to the bathroom in time. No fever. No dysuria. Urine is darker. Has ICD/PPM- will need clearance for MRI. CT Lspine shows \"  Postoperative changes from kyphoplasty procedure at L2 and L4. No lumbar spine   fracture or malalignment aside from the compression deformities. Multiple levels   of probable spinal canal stenosis as described on recent MRI. \"     UA positive. urine culture showing  more than 50k gram-negative rods and awaiting sensitivities. He was treated with Rocephin. CXR with improved infiltrates. COVID negative. MRI brain no acute infarct   MRI lumbar and cervical spine showed epidural, psoas abscess and osteomyelitis. CRP is elevated 12.4. His Eliquis and Plavix were stopped. He was on heparin drip.   Antibiotics were discontinued and IR was consulted for draining the abscess. Blood cultures were negative. ID was consulted and may need to 6 weeks of antibiotics treatment. Tomas was placed as he had urinary retention of 500 mL. Pain management with Percocet and morphine. PT,OT recommended STR. Wife Lena Ulloa [phone], or [phone]     He is a DNI       Subjective/24hr Events (08/01/22): Patient is seen and examined at the bedside. He is complaining of severe back pain. He states he took Tylenol 1000 mg 2 pills 3 times a day at home. He is hard of hearing. spoke with the spouse at the bedside. Patient denies chest pain, shortness of breath, nausea, vomiting, diarrhea. Assessment & Plan:     Back pain  Ortho following  Neurology signed off  Pain management with Percocet  PT,OT recommended STR     Newly diagnosed Epidural and psoas abscess   blood cultures prelim negative  Plan to start antibiotics Vanco and ceftriaxone after draining the abscess  May need long-term therapy with antibiotics for at least 6 weeks  Eliquis and Plavix is discontinued for the anticipation of procedures  IR was consulted for draining the abscess  ID is following, appreciate recommendations     Acute Osteomyelitis  Anticipate 6 weeks of antibiotic therapy      Acute cystitis without hematuria  Rocephin is stopped    urine culture showing  more than 50k gram-negative rods and awaiting sensitivities          DNI (do not intubate)  Noted and discussed       Elevated liver enzymes  Trend lab      Anemia  HGB 10.2, stable   Stable, chronic range for him  Trend HGB     Hypertension  Most likely due to pain  Norvasc is discontinued    Urinary retention  S/p Tomas  Added Flomax      Hyponatremia  Resolved     Ischemic cardiomyopathy  Plan:    Ventricular tachycardia ([address]  Plan:    Coronary artery disease involving native coronary artery of native heart without angina pectoris  Plan:    PAD (peripheral artery disease) ([address]  Plan:   Active Problems:    ICD (implantable cardioverter-defibrillator) in place  Plan:     Atrial fibrillation ([address]  Plan:   Eliquis and Plavix is discontinued for the anticipation of procedures  Patient is on heparin drip  Continue  amiodarone, statin      Severe obesity (BMI 35.0-39. 9) with comorbidity ([address]  Plan:   Needs modification       COPD (chronic obstructive pulmonary disease) ([facility])  Plan:   Resume inhalers       DM (diabetes mellitus) type II, controlled, with peripheral vascular disorder ([address]  Plan:   SSI       Obstructive sleep apnea  Plan:   Resume CPAP       Dispo/Discharge Planning: Anticipating hospital stay for 3 to 5 days       Diet:  ADULT DIET; Regular; 3 carb choices (45 gm/meal); Low Fat/Low Chol/High Fiber/ANGELA  ADULT ORAL NUTRITION SUPPLEMENT; Breakfast, Lunch, Dinner; Diabetic Oral Supplement  DVT Ppx Heparin drip  Code status: Limited    Hospital Problems             Last Modified POA    * (Principal) Back pain 7/27/2022 Yes    ICD (implantable cardioverter-defibrillator) in place 7/27/2022 Yes    General weakness 7/27/2022 Yes    Acute cystitis without hematuria 7/27/2022 Yes    Low back pain without sciatica 7/27/2022 Yes    DNI (do not intubate) (Chronic) 7/27/2022 Yes    UTI (urinary tract infection) 7/27/2022 Yes    Elevated liver enzymes 7/27/2022 Yes    Anemia (Chronic) 7/27/2022 Yes    Hyponatremia 7/27/2022 Yes    Atrial fibrillation ([address] 7/27/2022 Yes    Severe obesity (BMI 35.0-39. 9) with comorbidity ([address] 7/27/2022 Yes    COPD (chronic obstructive pulmonary disease) ([address] 7/27/2022 Yes    Intermittent spinal claudication ([address] 7/27/2022 Yes    Ischemic cardiomyopathy 7/27/2022 Yes    Ventricular tachycardia ([address] 7/27/2022 Yes    Coronary artery disease involving native coronary artery of native heart without angina pectoris 7/27/2022 Yes    PAD (peripheral artery disease) ([address] 7/27/2022 Yes    Asthma 7/27/2022 Yes    DM (diabetes mellitus) type II, controlled, with peripheral vascular disorder ([address] 7/27/2022 Yes Obstructive sleep apnea 7/27/2022 Yes       Objective:   Patient Vitals for the past 24 hrs:   Temp Pulse Resp BP SpO2   08/01/22 1219 97.3 °F (36.3 °C) 61 20 (!) 140/77 93 %   08/01/22 1015 -- -- -- -- 93 %   08/01/22 0800 98.2 °F (36.8 °C) 59 20 (!) 149/75 (!) 89 %   08/01/22 0652 -- 60 16 -- 97 %   08/01/22 0300 97.9 °F (36.6 °C) 60 20 (!) 154/72 97 %   07/31/22 2305 97.9 °F (36.6 °C) 62 18 (!) 155/76 92 %   07/31/22 2007 -- 61 16 -- 94 %   07/31/22 1900 98.1 °F (36.7 °C) 62 18 119/75 95 %   07/31/22 1511 98.1 °F (36.7 °C) 63 20 127/74 93 %       Estimated body mass index is 34.3 kg/m² as calculated from the following:    Height as of this encounter: 6' 1\" (1.854 m). Weight as of 7/28/22: 260 lb (117.9 kg). Intake/Output Summary (Last 24 hours) at 8/1/2022 1336  Last data filed at 8/1/2022 1220  Gross per 24 hour   Intake 0 ml   Output 2550 ml   Net -2550 ml         Physical Exam:     Blood pressure (!) 140/77, pulse 61, temperature 97.3 °F (36.3 °C), temperature source Oral, resp. rate 20, height 6' 1\" (1.854 m), weight 260 lb (117.9 kg), SpO2 93 %. General:    Well nourished. No overt distress, morbidly obese  Head:  Normocephalic, atraumatic  Eyes:  Sclerae appear normal. Pupils equally round. ENT:  Nares appear normal, no drainage. Moist oral mucosa  Neck:  No restricted ROM. Trachea midline. CV:   RRR. No m/r/g. No jugular venous distension. Lungs:   CTAB. No wheezing, rhonchi, or rales. Respirations even, unlabored. Abdomen: Bowel sounds present. Soft, nontender, nondistended. Extremities: No cyanosis or clubbing. No edema. Tenderness of the back is present. Skin:     No rashes and normal coloration. Warm and dry. Neuro:  CN II-XII grossly intact. Sensation intact. A&Ox3  Psych:  Normal mood and affect.       I have reviewed ordered lab tests and independently visualized imaging below:    Recent Labs:  Recent Results (from the past 48 hour(s))   Culture, Blood 1    Collection Time: 07/30/22  2:49 PM    Specimen: Blood   Result Value Ref Range    Special Requests RIGHT  HAND        Culture NO GROWTH 2 DAYS     Culture, Blood 1    Collection Time: 07/30/22  2:49 PM    Specimen: Blood   Result Value Ref Range    Special Requests LEFT  HAND        Culture NO GROWTH 2 DAYS     Transthoracic echocardiogram (TTE) complete with contrast, bubble, strain, and 3D PRN    Collection Time: 07/30/22  3:37 PM   Result Value Ref Range    LV EDV A2C 256 mL    LV EDV A4C 283 mL    LV ESV A2C 134 mL    LV ESV A4C 130 mL    IVSd 0.9 0.6 - 1.0 cm    LVIDd 6.7 (A) 4.2 - 5.9 cm    LVIDs 5.0 cm    LVOT Diameter 2.5 cm    LVOT Mean Gradient 3 mmHg    LVOT VTI 25.8 cm    LVOT Peak Velocity 1.1 m/s    LVOT Peak Gradient 5 mmHg    LVPWd 1.0 0.6 - 1.0 cm    LV E' Lateral Velocity 8 cm/s    LV E' Septal Velocity 7 cm/s    LV Ejection Fraction A2C 48 %    LV Ejection Fraction A4C 54 %    EF BP 52 (A) 55 - 100 %    LVOT Area 4.9 cm2    LVOT .6 ml    LA Minor Axis 7.0 cm    LA Major Axis 6.9 cm    LA Area 2C 25.6 cm2    LA Area 4C 26.4 cm2    LA Volume BP 81 (A) 18 - 58 mL    LA Diameter 4.5 cm    AV Mean Velocity 1.3 m/s    AV Mean Gradient 7 mmHg    AV VTI 42.5 cm    AV Peak Velocity 1.8 m/s    AV Peak Gradient 13 mmHg    AV Area by VTI 3.0 cm2    AV Area by Peak Velocity 3.1 cm2    Aortic Root 3.9 cm    Ascending Aorta 3.7 cm    IVC Proxmal 2.7 cm    MR .0 cm    MV Mean Gradient 4 mmHg    MV VTI 47.1 cm    MV Mean Velocity 0.9 m/s    MR Peak Velocity 5.6 m/s    MR Peak Gradient 125 mmHg    MV Max Velocity 1.7 m/s    MV Peak Gradient 11 mmHg    MV E Wave Deceleration Time 219.0 ms    MV A Velocity 1.04 m/s    MV E Velocity 1.38 m/s    MV Area by VTI 2.7 cm2    PV AT 71.0 ms    PV Max Velocity 1.3 m/s    PV Peak Gradient 7 mmHg    Est. RA Pressure 8 mmHg    RVIDd 4.1 cm    RV Basal Dimension 4.6 cm    TAPSE 2.4 1.7 cm    TR Max Velocity 2.17 m/s    TR Peak Gradient 19 mmHg    Body Surface Area 2.46 m2    Fractional Shortening 2D 25 28 - 44 %    LV ESV Index A4C 54 mL/m2    LV EDV Index A4C 117 mL/m2    LV ESV Index A2C 56 mL/m2    LV EDV Index A2C 106 mL/m2    LVIDd Index 2.78 cm/m2    LVIDs Index 2.07 cm/m2    LV RWT Ratio 0.30     LV Mass 2D 279.6 (A) 88 - 224 g    LV Mass 2D Index 116.0 (A) 49 - 115 g/m2    MV E/A 1.33     E/E' Ratio (Averaged) 18.48     E/E' Lateral 17.25     E/E' Septal 19.71     LA Volume Index BP 34 16 - 34 ml/m2    LVOT Stroke Volume Index 52.5 mL/m2    LA Size Index 1.87 cm/m2    LA/AO Root Ratio 1.15     Ao Root Index 1.62 cm/m2    Ascending Aorta Index 1.54 cm/m2    AV Velocity Ratio 0.61     LVOT:AV VTI Index 0.61     CORI/BSA VTI 1.2 cm2/m2    CORI/BSA Peak Velocity 1.3 cm2/m2    MV:LVOT VTI Index 1.83     RVSP 27 mmHg   POCT Glucose    Collection Time: 07/30/22  4:21 PM   Result Value Ref Range    POC Glucose 208 (H) 65 - 100 mg/dL    Performed by: Sarah.     APTT    Collection Time: 07/30/22  5:07 PM   Result Value Ref Range    PTT 41.8 (H) 24.1 - 35.1 SEC   POCT Glucose    Collection Time: 07/30/22  8:25 PM   Result Value Ref Range    POC Glucose 174 (H) 65 - 100 mg/dL    Performed by: Shelia Lira    APTT    Collection Time: 07/31/22 12:29 AM   Result Value Ref Range    PTT 50.0 (H) 24.1 - 35.1 SEC   CBC with Auto Differential    Collection Time: 07/31/22  7:30 AM   Result Value Ref Range    WBC 7.4 4.3 - 11.1 K/uL    RBC 2.90 (L) 4.23 - 5.6 M/uL    Hemoglobin 9.1 (L) 13.6 - 17.2 g/dL    Hematocrit 27.6 (L) 41.1 - 50.3 %    MCV 95.2 79.6 - 97.8 FL    MCH 31.4 26.1 - 32.9 PG    MCHC 33.0 31.4 - 35.0 g/dL    RDW 14.6 11.9 - 14.6 %    Platelets 666 980 - 359 K/uL    MPV 10.2 9.4 - 12.3 FL    nRBC 0.00 0.0 - 0.2 K/uL    Differential Type AUTOMATED      Seg Neutrophils 78 43 - 78 %    Lymphocytes 12 (L) 13 - 44 %    Monocytes 8 4.0 - 12.0 %    Eosinophils % 0 (L) 0.5 - 7.8 %    Basophils 0 0.0 - 2.0 %    Immature Granulocytes 2 0.0 - 5.0 %    Segs Absolute 5.8 1.7 - 8.2 K/UL    Absolute Lymph # 0.9 0.5 - 4.6 K/UL    Absolute Mono # 0.6 0.1 - 1.3 K/UL    Absolute Eos # 0.0 0.0 - 0.8 K/UL    Basophils Absolute 0.0 0.0 - 0.2 K/UL    Absolute Immature Granulocyte 0.1 0.0 - 0.5 K/UL   Basic Metabolic Panel w/ Reflex to MG    Collection Time: 07/31/22  7:30 AM   Result Value Ref Range    Sodium 133 (L) 138 - 145 mmol/L    Potassium 3.4 (L) 3.5 - 5.1 mmol/L    Chloride 96 (L) 98 - 107 mmol/L    CO2 32 21 - 32 mmol/L    Anion Gap 5 (L) 7 - 16 mmol/L    Glucose 156 (H) 65 - 100 mg/dL    BUN 6 (L) 8 - 23 MG/DL    Creatinine 0.70 (L) 0.8 - 1.5 MG/DL    GFR African American >60 >60 ml/min/1.73m2    GFR Non- >60 >60 ml/min/1.73m2    Calcium 8.7 8.3 - 10.4 MG/DL   Phosphorus    Collection Time: 07/31/22  7:30 AM   Result Value Ref Range    Phosphorus 3.0 2.3 - 3.7 MG/DL   APTT    Collection Time: 07/31/22  7:30 AM   Result Value Ref Range    PTT 49.8 (H) 24.1 - 35.1 SEC   Magnesium    Collection Time: 07/31/22  7:30 AM   Result Value Ref Range    Magnesium 1.8 1.8 - 2.4 mg/dL   POCT Glucose    Collection Time: 07/31/22  7:36 AM   Result Value Ref Range    POC Glucose 152 (H) 65 - 100 mg/dL    Performed by: [place]    POCT Glucose    Collection Time: 07/31/22 11:34 AM   Result Value Ref Range    POC Glucose 211 (H) 65 - 100 mg/dL    Performed by: [place]    APTT    Collection Time: 07/31/22  3:52 PM   Result Value Ref Range    PTT 53.1 (H) 24.1 - 35.1 SEC   POCT Glucose    Collection Time: 07/31/22  4:13 PM   Result Value Ref Range    POC Glucose 190 (H) 65 - 100 mg/dL    Performed by: [place]    POCT Glucose    Collection Time: 07/31/22  8:55 PM   Result Value Ref Range    POC Glucose 253 (H) 65 - 100 mg/dL    Performed by:  See)RNMallica    APTT    Collection Time: 07/31/22 11:01 PM   Result Value Ref Range    PTT 68.9 (H) 24.1 - 35.1 SEC   CBC with Auto Differential    Collection Time: 08/01/22  6:46 AM   Result Value Ref Range    WBC 7.7 4.3 - 11.1 K/uL    RBC 2.91 (L) 4.23 - 5.6 M/uL    Hemoglobin 9.1 (L) 13.6 - 17.2 g/dL    Hematocrit 27.6 (L) 41.1 - 50.3 %    MCV 94.8 79.6 - 97.8 FL    MCH 31.3 26.1 - 32.9 PG    MCHC 33.0 31.4 - 35.0 g/dL    RDW 14.4 11.9 - 14.6 %    Platelets 487 900 - 292 K/uL    MPV 10.3 9.4 - 12.3 FL    nRBC 0.00 0.0 - 0.2 K/uL    Differential Type AUTOMATED      Seg Neutrophils 78 43 - 78 %    Lymphocytes 12 (L) 13 - 44 %    Monocytes 8 4.0 - 12.0 %    Eosinophils % 0 (L) 0.5 - 7.8 %    Basophils 0 0.0 - 2.0 %    Immature Granulocytes 2 0.0 - 5.0 %    Segs Absolute 6.0 1.7 - 8.2 K/UL    Absolute Lymph # 0.9 0.5 - 4.6 K/UL    Absolute Mono # 0.6 0.1 - 1.3 K/UL    Absolute Eos # 0.0 0.0 - 0.8 K/UL    Basophils Absolute 0.0 0.0 - 0.2 K/UL    Absolute Immature Granulocyte 0.2 0.0 - 0.5 K/UL   Comprehensive Metabolic Panel w/ Reflex to MG    Collection Time: 08/01/22  6:46 AM   Result Value Ref Range    Sodium 130 (L) 136 - 145 mmol/L    Potassium 3.6 3.5 - 5.1 mmol/L    Chloride 96 (L) 98 - 107 mmol/L    CO2 33 (H) 21 - 32 mmol/L    Anion Gap 1 (L) 7 - 16 mmol/L    Glucose 171 (H) 65 - 100 mg/dL    BUN 7 (L) 8 - 23 MG/DL    Creatinine 0.60 (L) 0.8 - 1.5 MG/DL    GFR African American >60 >60 ml/min/1.73m2    GFR Non- >60 >60 ml/min/1.73m2    Calcium 8.7 8.3 - 10.4 MG/DL    Total Bilirubin 0.6 0.2 - 1.1 MG/DL     (H) 12 - 65 U/L    AST 70 (H) 15 - 37 U/L    Alk Phosphatase 140 (H) 50 - 136 U/L    Total Protein 6.1 (L) 6.3 - 8.2 g/dL    Albumin 2.1 (L) 3.2 - 4.6 g/dL    Globulin 4.0 (H) 2.3 - 3.5 g/dL    Albumin/Globulin Ratio 0.5 (L) 1.2 - 3.5     Phosphorus    Collection Time: 08/01/22  6:46 AM   Result Value Ref Range    Phosphorus 3.0 2.3 - 3.7 MG/DL   APTT    Collection Time: 08/01/22  6:46 AM   Result Value Ref Range    PTT 72.5 (H) 24.1 - 35.1 SEC   POCT Glucose    Collection Time: 08/01/22  8:02 AM   Result Value Ref Range    POC Glucose 172 (H) 65 - 100 mg/dL    Performed by: HagoodTiffinayJ. C-Reactive Protein    Collection Time: 08/01/22  9:51 AM   Result Value Ref Range    CRP 8.9 (H) 0.0 - 0.9 mg/dL   Sedimentation Rate    Collection Time: 08/01/22  9:51 AM   Result Value Ref Range    Sed Rate, Automated 42 (H) 15 mm/hr   POCT Glucose    Collection Time: 08/01/22 12:20 PM   Result Value Ref Range    POC Glucose 196 (H) 65 - 100 mg/dL    Performed by: Mateo Flores. Other Studies:  XR LUMBAR SPINE (2-3 VIEWS)    Result Date: 7/27/2022  AUTOMATIC ADMINISTRATIVE RESULT The result for this exam can be found in the Progress note in the chart. See Progress note in the chart. CT LUMBAR SPINE WO CONTRAST    Result Date: 7/27/2022  EXAMINATION: CT LUMBAR SPINE WO CONTRAST 7/27/2022 6:21 PM ACCESSION NUMBER: [id number] COMPARISON: MRI lumbar spine 06/02/2022. INDICATION: Recent kyphoplasty with fall. TECHNIQUE: Multiple-row detector helical CT examination of the lumbar spine was performed without intravenous contrast. Multiplanar reformats were provided. Radiation dose reduction techniques were used for this study. Our CT scanners use one or all of the following: Automated exposure control, adjustment of the mA and/or kV according to patient size, iterative reconstruction. FINDINGS: Patient is status post interval kyphoplasty procedure at L2 and L4 when compared to prior MRI. Mild compression fractures are noted at these levels. No retropulsed fragments noted in the spinal canal. Multilevel degenerative disc changes are present. No malalignment. Areas of spinal canal stenosis suspected as described on recent prior MRI. Postoperative changes from kyphoplasty procedure at L2 and L4. No lumbar spine fracture or malalignment aside from the compression deformities. Multiple levels of probable spinal canal stenosis as described on recent MRI. XR CHEST PORTABLE    Result Date: 7/27/2022  XR CHEST PORTABLE 7/27/2022 6:35 PM HISTORY: Weakness.  COMPARISON: Chest x-ray 3/7/2022. A portable AP view of the chest was obtained. Pulmonary infiltrate predominantly in the right upper lobe and mid to lower left lung has mostly cleared. Atelectasis or scarring right lung base appears stable. Stable cardiomegaly. Implantable cardiac device left chest and both leads are unchanged. No pneumothorax. No pleural effusions.        Current Meds:  Current Facility-Administered Medications   Medication Dose Route Frequency    heparin (porcine) injection 2,000 Units  2,000 Units IntraVENous Once    oxyCODONE-acetaminophen (PERCOCET) 5-325 MG per tablet 2 tablet  2 tablet Oral Q4H PRN    morphine (MS CONTIN) extended release tablet 15 mg  15 mg Oral 2 times per day    tamsulosin (FLOMAX) capsule 0.4 mg  0.4 mg Oral Daily    heparin (porcine) injection 4,000 Units  4,000 Units IntraVENous PRN    heparin (porcine) injection 2,000 Units  2,000 Units IntraVENous PRN    heparin 25,000 units in dextrose 5% 250 mL (premix) infusion  5-30 Units/kg/hr IntraVENous Continuous    albuterol sulfate HFA (PROVENTIL;VENTOLIN;PROAIR) 108 (90 Base) MCG/ACT inhaler 2 puff  2 puff Inhalation Q4H PRN    amiodarone (CORDARONE) tablet 200 mg  200 mg Oral BID    [Held by provider] apixaban (ELIQUIS) tablet 5 mg  5 mg Oral 2 times per day    calcitonin (MIACALCIN) nasal spray 1 spray  1 spray Alternating Nares Daily    carvedilol (COREG) tablet 25 mg  25 mg Oral BID WC    [Held by provider] clopidogrel (PLAVIX) tablet 75 mg  75 mg Oral Daily    fluticasone (FLONASE) 50 MCG/ACT nasal spray 2 spray  2 spray Each Nostril Daily    mometasone-formoterol (DULERA) 200-5 MCG/ACT inhaler 2 puff  2 puff Inhalation BID    furosemide (LASIX) tablet 20 mg  20 mg Oral Daily    latanoprost (XALATAN) 0.005 % ophthalmic solution 1 drop  1 drop Ophthalmic Daily    montelukast (SINGULAIR) tablet 10 mg  10 mg Oral Nightly    [Held by provider] rosuvastatin (CRESTOR) tablet 10 mg  10 mg Oral Daily    valsartan (DIOVAN) tablet 320 mg  320 mg Oral Daily    sodium chloride flush 0.9 % injection 5-40 mL  5-40 mL IntraVENous 2 times per day    sodium chloride flush 0.9 % injection 5-40 mL  5-40 mL IntraVENous PRN    0.9 % sodium chloride infusion   IntraVENous PRN    ondansetron (ZOFRAN-ODT) disintegrating tablet 4 mg  4 mg Oral Q8H PRN    Or    ondansetron (ZOFRAN) injection 4 mg  4 mg IntraVENous Q6H PRN    polyethylene glycol (GLYCOLAX) packet 17 g  17 g Oral Daily PRN    acetaminophen (TYLENOL) tablet 650 mg  650 mg Oral Q6H PRN    Or    acetaminophen (TYLENOL) suppository 650 mg  650 mg Rectal Q6H PRN    glucose chewable tablet 16 g  4 tablet Oral PRN    dextrose bolus 10% 125 mL  125 mL IntraVENous PRN    Or    dextrose bolus 10% 250 mL  250 mL IntraVENous PRN    glucagon (rDNA) injection 1 mg  1 mg SubCUTAneous PRN    dextrose 10 % infusion   IntraVENous Continuous PRN    insulin lispro (HUMALOG) injection vial 0-4 Units  0-4 Units SubCUTAneous TID WC    insulin lispro (HUMALOG) injection vial 0-4 Units  0-4 Units SubCUTAneous Nightly     Facility-Administered Medications Ordered in Other Encounters   Medication Dose Route Frequency    gadoteridol (PROHANCE) injection 23 mL  23 mL IntraVENous ONCE PRN    sodium chloride flush 0.9 % injection 10 mL  10 mL IntraVENous PRN       Signed:  Bishop Taz MD    Part of this note may have been written by using a voice dictation software. The note has been proof read but may still contain some grammatical/other typographical errors.

## 2022-08-01 NOTE — PROGRESS NOTES
Infectious Disease Progress Note      Today's Date: 8/1/2022   Admit Date: 7/27/2022    Impression:   MRI findings of L2-L4 osteodiscitis and psoas abscess in setting of kyphoplasty 7/20 for osteoporotic L2 and L4 compression fractures. DM2, HgbA1c 5.3  Ischemic CMP with ICD/PPM in place  A-fib on Eliquis  COPD on supplemental oxygen  Obesity with SHERI on CPAP  History of Serratia septicemia (10/23/14) in setting of R groin wound infection; S/P Right common femoral artery exploration/ repair of the right common femoral artery/ and sartorious myoplasty 11/5/14;   Operative tissue cx negative  10/23/14 Right groin swab grew Serratia (resistant to Cefazolin and Cefuroxime); & Morganella (resistant to  Ampicillin Cefazolin and Cefuroxime)  History of bilateral common femoral artery endarterectomy on 9/11/14 with persistent problems with non-healing of the right groin wound. Plan:   Follow blood cx but given recent ABX may be negative. Await IR aspirate. OK to restart abx  once this completed: recommend vanc and ceftriaxone 2 gm daily. Anticipate 6 weeks of therapy based on imaging findings; regimen TBD    Anti-infectives:   Ceftriaxone 7/27-7/29  Vancomycin 7/29 x 1  Pip/tazo 7/30 x 1    Subjective: Interval History: Afebrile; NPO for aspiration; moderate back pain    Patient is a 65 y.o. male with PMH of ICM with PM (EF 30-35%), DM2, recent L2-L4 kyphoplasty 7/20 who presented with back pain and LE weakness. Had several falls following the surgery and worsening weakness so recommended he present for evaluation and MRI. He has otherwise been afebrile, no white count, no report of surgical site issues. He was started on ceftriaxone for his urine, unclear as to why as there were no symptoms and urine was not consistent with infection. Unfortunately MRI was not done until yesterday due to his PM, findings of discitis/OM from L2-L4 with phelgmon and small L psoas abscess.  IR has been consulted for aspirate of the area, right now surgery note indicates hoping to hold on further surgical intervention and treat medically. No blood cultures obtained. Patient Active Problem List   Diagnosis    H/O endarterectomy    Arterial degeneration    Atrial fibrillation (HCC)    Complicated wound infection    Allergic rhinitis    Elevated troponin    HTN (hypertension)    Pulmonary emphysema (HCC)    Atherosclerosis of native arteries of extremity with intermittent claudication (HCC)    Severe obesity (BMI 35.0-39. 9) with comorbidity (HCC)    COPD (chronic obstructive pulmonary disease) (HCC)    Personal history of tobacco use, presenting hazards to health    Intermittent spinal claudication (HCC)    Cigarette nicotine dependence in remission    Irregular heart beat    Acute metabolic encephalopathy    Chest pain    Hiatal hernia    Diabetic neuropathy (HCC)    Normocytic anemia    Chronic pain of right knee    Sleep apnea    Osteoarthritis    Encounter for long-term (current) drug use    Ischemic cardiomyopathy    Ventricular tachycardia (HCC)    VT (ventricular tachycardia) ([facility])    Coronary artery disease involving native coronary artery of native heart without angina pectoris    Benign neoplasm of colon    PAD (peripheral artery disease) ([facility])    Asthma    Orthostatic hypotension    Hyperlipidemia    S/P angioplasty with stent    DM (diabetes mellitus) type II, controlled, with peripheral vascular disorder (HCC)    Toe laceration    Obstructive sleep apnea    MINI (acute kidney injury) ([address]    Type 2 diabetes with nephropathy ([facility])    Hypoxia    Abnormal nuclear cardiac imaging test    PVC's (premature ventricular contractions)    Asbestos exposure    Sepsis ([address]    ICD (implantable cardioverter-defibrillator) in place    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, vertebra(e), init ([address]    General weakness    Acute cystitis without hematuria    Low back pain without sciatica    Back pain    DNI (do not intubate)    UTI (urinary tract infection)    Elevated liver enzymes    Anemia    Hyponatremia     Past Medical History:   Diagnosis Date    Acute respiratory failure with hypoxia ([address] 10/23/2014    MINI (acute kidney injury) ([address] 09/15/2014    MINI (acute kidney injury) ([address]     MINI (acute kidney injury) ([address]     Allergic rhinitis 11/10/2015    Asthma 11/10/2015    Benign essential hypertension 11/10/2015    Benign neoplasm of colon 11/10/2015    CAD (coronary artery disease) 11/10/2015    CAD (coronary artery disease) 1998, 1999    mix2, 3 stents [id number]    CAP (community acquired pneumonia) 12/31/2020    Cardiomyopathy ([address] 23/97/9644    Complicated wound infection 11/10/2015    COPD (chronic obstructive pulmonary disease) with emphysema ([address] 11/10/2015    COPD exacerbation ([address] 10/12/2011    COVID-19 12/31/2020    Diabetes mellitus type 2, controlled ([address] 11/10/2015    Diabetic neuropathy ([address] 11/10/2015    Disruption of wound, unspecified 10/09/2014    Encounter for long-term (current) use of other high-risk medications 11/10/2015    Extremity atherosclerosis with intermittent claudication ([address] 11/10/2015    H/O endarterectomy 11/10/2015    Hiatal hernia 11/10/2015    History of 2019 novel coronavirus disease (COVID-19)     12/31/2020-    Hyperglycemia due to type 1 diabetes mellitus ([address] 11/10/2015    Hyperlipidemia 11/10/2015    ICD (implantable cardioverter-defibrillator) in place 06/16/2022    Monomorphic ventricular tachycardia ([address] 12/31/2020    Myocardial infarction ([address] 1999    Myocardial infarction ([address] 11/10/2015    Obesity 11/10/2015    Orthostatic hypotension 11/10/2015    Osteoarthritis 11/10/2015    Peripheral vascular disease ([address] 11/10/2015    PNA (pneumonia) 02/08/2019    PVC (premature ventricular contraction)     Rash 79/94/0924    Seroma complicating a procedure 10/02/2014    Sleep apnea     cpap    Toe laceration     Type 2 diabetes with nephropathy ([facility]     Uncontrolled type 2 diabetes mellitus ([facility] 11/10/2015    Vertiginous syndromes and other disorders of vestibular system 11/10/2015      Family History   Problem Relation Age of Onset    Breast Cancer Mother     Hypertension Mother     Other Father         DIVERTICULITIS    Crohn's Disease Sister     Lung Disease Brother         mesothioloma    Diabetes Sister     Heart Attack Father     Heart Disease Father     Stroke Mother       Social History     Tobacco Use    Smoking status: Former     Packs/day: 1.00     Types: Cigarettes     Quit date: 10/2/2011     Years since quitting: 10.8    Smokeless tobacco: Current   Substance Use Topics    Alcohol use: Yes     Alcohol/week: 0.0 standard drinks     Past Surgical History:   Procedure Laterality Date    CARDIAC CATHETERIZATION  12/05/2018    LV EF=40%. LM:nl. LAD:30-40% mid stenosis. LCX OM1:95% stenosis. RCA:100% occlusion at RV branch. CORONARY ANGIOPLASTY WITH STENT PLACEMENT  12/05/2018    LCX OM1:2.5 x 12 Giuseppe RAKESH    CORONARY ANGIOPLASTY WITH STENT PLACEMENT  1999    [place] ABDOMEN SURGERY PROC UNLISTED  1960    abdominal cyst removed    [place] CARDIAC SURG PROCEDURE UNLIST  1999    stents x3    SPINE SURGERY N/A 7/19/2022    LUMBAR 2, LUMBAR 4 KYPHOPLASTY AND ANY OTHER INDICATED LEVELS performed by Nathanael Palacio MD at [address] [place] [address]    VASCULAR SURGERY  11/5/14    Repair of right common femoral artery     VASCULAR SURGERY  9/11/14    tiffanie femoral endarterectomy      Prior to Admission medications    Medication Sig Start Date End Date Taking?  Authorizing Provider   fluticasone-salmeterol (ADVAIR) 250-50 MCG/ACT AEPB diskus inhaler Inhale 1 puff into the lungs in the morning and at bedtime 6/30/22  Yes Historical Provider, MD   calcitonin (MIACALCIN) 200 UNIT/ACT nasal spray 1 spray by Nasal route daily 6/3/22  Yes Lorna , APRN - CNP   albuterol sulfate  (90 Base) MCG/ACT inhaler USE 2 PUFFS BY INHALATION 4 TIMES DAILY AS NEEDED FOR WHEEZING OR SHORTNESS OF BREATH. 1/14/22  Yes Ar Automatic Reconciliation   amiodarone (CORDARONE) 200 MG tablet Take 200 mg by mouth 2 times daily 3/10/22  Yes Ar Automatic Reconciliation   apixaban (ELIQUIS) 5 MG TABS tablet Take 5 mg by mouth every 12 hours 3/10/22  Yes Ar Automatic Reconciliation   ascorbic acid (VITAMIN C) 500 MG tablet Take 500 mg by mouth   Yes Ar Automatic Reconciliation   carvedilol (COREG) 25 MG tablet Take 25 mg by mouth 2 times daily (with meals) 3/10/22  Yes Ar Automatic Reconciliation   Cholecalciferol 50 MCG (2000 UT) TABS Take 2,000 Units by mouth   Yes Ar Automatic Reconciliation   clopidogrel (PLAVIX) 75 MG tablet Take 75 mg by mouth daily 3/11/22  Yes Ar Automatic Reconciliation   fluticasone (FLONASE) 50 MCG/ACT nasal spray USE 1 OR 2 SPRAYS IN EACH NOSTRIL EVERY DAY. 3/15/22  Yes Ar Automatic Reconciliation   furosemide (LASIX) 40 MG tablet Take 20 mg by mouth daily TAKE 1 TABLET BY MOUTH EVERY DAY 2/7/22  Yes Ar Automatic Reconciliation   glipiZIDE (GLUCOTROL XL) 10 MG extended release tablet TAKE 1 TABLET BY MOUTH TWICE DAILY 1/14/22  Yes Ar Automatic Reconciliation   glucosamine-chondroitin 750-600 MG TABS tablet Take by mouth 2 times daily   Yes Ar Automatic Reconciliation   guaiFENesin 1200 MG TB12 Take 1,200 mg by mouth 2 times daily as needed   Yes Ar Automatic Reconciliation   latanoprost (XALATAN) 0.005 % ophthalmic solution Apply 1 drop to eye   Yes Ar Automatic Reconciliation   magnesium oxide (MAG-OX) 400 (240 Mg) MG tablet TAKE 1 TABLET BY MOUTH EVERY DAY 1/14/22  Yes Ar Automatic Reconciliation   metFORMIN (GLUCOPHAGE) 500 MG tablet TAKE TWO TABLETS BY MOUTH 2 TIMES A DAY 3/23/22  Yes Ar Automatic Reconciliation   montelukast (SINGULAIR) 10 MG tablet TAKE 1 TABLET BY MOUTH EVERY DAY AT BEDTIME 3/15/22  Yes Ar Automatic Reconciliation   SITagliptin (JANUVIA) 100 MG tablet TAKE 1 TABLET BY MOUTH EVERY DAY 3/15/22  Yes Ar Automatic Reconciliation   valsartan (DIOVAN) 320 MG tablet Take 320 mg by mouth daily 3/10/22  Yes Ar Automatic Reconciliation   rosuvastatin (CRESTOR) 10 MG tablet TAKE 1 TABLET BY MOUTH EVERY DAY 22   Janina Hicks MD   HYDROcodone-acetaminophen [facility]) 7.5-325 MG per tablet Take 1 tablet by mouth every 6 hours as needed for Pain for up to 7 days. Intended supply: 7 days. Take lowest dose possible to manage pain 7/27/22 8/3/22  MYRIAM Olson III, MD   tiZANidine (ZANAFLEX) 2 MG tablet Take 1 tablet by mouth every 8 hours as needed (muscle spasms) 22   Janina Hicks MD   nitroGLYCERIN (NITROSTAT) 0.4 MG SL tablet Place 0.4 mg under the tongue 3/11/22   Ar Automatic Reconciliation   polyethylene glycol (GLYCOLAX) 17 GM/SCOOP powder Take 17 g by mouth daily 21   Ar Automatic Reconciliation     Allergies   Allergen Reactions    Azithromycin Hives    Oxaprozin Other (See Comments)     ELEVATED BLOOD PRESSURE        Review of Systems:  All systems reviewed and negative except as otherwise stated in the HPI    Objective:   Blood pressure (!) 149/75, pulse 59, temperature 98.2 °F (36.8 °C), temperature source Oral, resp. rate 20, height 6' 1\" (1.854 m), weight 260 lb (117.9 kg), SpO2 (!) 89 %. Visit Vitals  BP (!) 149/75   Pulse 59   Temp 98.2 °F (36.8 °C) (Oral)   Resp 20   Ht 6' 1\" (1.854 m)   Wt 260 lb (117.9 kg)   SpO2 (!) 89%   BMI 34.30 kg/m²     Temp (24hrs), Av °F (36.7 °C), Min:97.6 °F (36.4 °C), Max:98.2 °F (36.8 °C)       Exam:    General:  Alert, cooperative, well noursished, well developed, appears stated age   Eyes:  Sclera anicteric. Pupils equally round and reactive to light. Mouth/Throat: Mucous membranes normal, oral pharynx clear   Neck: Supple   Lungs:   Clear to auscultation bilaterally, good effort   CV:  Regular rate and rhythm,no murmur, click, rub or gallop   Abdomen:   Soft, non-tender.  bowel sounds normal. non-distended   Extremities: No cyanosis or edema   Skin: Skin color, texture, turgor normal. no acute rash or lesions   Lymph nodes: Cervical and supraclavicular normal   Musculoskeletal: No swelling or deformity   Lines/Devices:  Intact, no erythema, drainage or tenderness   Psych: Alert and oriented, normal mood affect given the setting       CBC:  Recent Labs     07/30/22  0715 07/31/22  0730 08/01/22  0646   WBC 7.4 7.4 7.7   HGB 9.8* 9.1* 9.1*   HCT 29.4* 27.6* 27.6*    269 264         BMP:  Recent Labs     07/30/22  0715 07/31/22  0730 08/01/22  0646   BUN 7* 6* 7*   * 133* 130*   K 3.3* 3.4* 3.6   CL 96* 96* 96*   CO2 31 32 33*         LFTS:  Recent Labs     08/01/22  0646   *         Data Review:   Recent Results (from the past 24 hour(s))   POCT Glucose    Collection Time: 07/31/22 11:34 AM   Result Value Ref Range    POC Glucose 211 (H) 65 - 100 mg/dL    Performed by: [place]    APTT    Collection Time: 07/31/22  3:52 PM   Result Value Ref Range    PTT 53.1 (H) 24.1 - 35.1 SEC   POCT Glucose    Collection Time: 07/31/22  4:13 PM   Result Value Ref Range    POC Glucose 190 (H) 65 - 100 mg/dL    Performed by: [place]    POCT Glucose    Collection Time: 07/31/22  8:55 PM   Result Value Ref Range    POC Glucose 253 (H) 65 - 100 mg/dL    Performed by:  Castañeda (Hoeung)Malannette    APTT    Collection Time: 07/31/22 11:01 PM   Result Value Ref Range    PTT 68.9 (H) 24.1 - 35.1 SEC   CBC with Auto Differential    Collection Time: 08/01/22  6:46 AM   Result Value Ref Range    WBC 7.7 4.3 - 11.1 K/uL    RBC 2.91 (L) 4.23 - 5.6 M/uL    Hemoglobin 9.1 (L) 13.6 - 17.2 g/dL    Hematocrit 27.6 (L) 41.1 - 50.3 %    MCV 94.8 79.6 - 97.8 FL    MCH 31.3 26.1 - 32.9 PG    MCHC 33.0 31.4 - 35.0 g/dL    RDW 14.4 11.9 - 14.6 %    Platelets 332 195 - 056 K/uL    MPV 10.3 9.4 - 12.3 FL    nRBC 0.00 0.0 - 0.2 K/uL    Differential Type AUTOMATED      Seg Neutrophils 78 43 - 78 %    Lymphocytes 12 (L) 13 - 44 %    Monocytes 8 4.0 - 12.0 %    Eosinophils % 0 (L) 0.5 - 7.8 %    Basophils 0 0.0 - 2.0 %    Immature Granulocytes 2 0.0 - 5.0 % Segs Absolute 6.0 1.7 - 8.2 K/UL    Absolute Lymph # 0.9 0.5 - 4.6 K/UL    Absolute Mono # 0.6 0.1 - 1.3 K/UL    Absolute Eos # 0.0 0.0 - 0.8 K/UL    Basophils Absolute 0.0 0.0 - 0.2 K/UL    Absolute Immature Granulocyte 0.2 0.0 - 0.5 K/UL   Comprehensive Metabolic Panel w/ Reflex to MG    Collection Time: 08/01/22  6:46 AM   Result Value Ref Range    Sodium 130 (L) 136 - 145 mmol/L    Potassium 3.6 3.5 - 5.1 mmol/L    Chloride 96 (L) 98 - 107 mmol/L    CO2 33 (H) 21 - 32 mmol/L    Anion Gap 1 (L) 7 - 16 mmol/L    Glucose 171 (H) 65 - 100 mg/dL    BUN 7 (L) 8 - 23 MG/DL    Creatinine 0.60 (L) 0.8 - 1.5 MG/DL    GFR African American >60 >60 ml/min/1.73m2    GFR Non- >60 >60 ml/min/1.73m2    Calcium 8.7 8.3 - 10.4 MG/DL    Total Bilirubin 0.6 0.2 - 1.1 MG/DL     (H) 12 - 65 U/L    AST 70 (H) 15 - 37 U/L    Alk Phosphatase 140 (H) 50 - 136 U/L    Total Protein 6.1 (L) 6.3 - 8.2 g/dL    Albumin 2.1 (L) 3.2 - 4.6 g/dL    Globulin 4.0 (H) 2.3 - 3.5 g/dL    Albumin/Globulin Ratio 0.5 (L) 1.2 - 3.5     Phosphorus    Collection Time: 08/01/22  6:46 AM   Result Value Ref Range    Phosphorus 3.0 2.3 - 3.7 MG/DL   APTT    Collection Time: 08/01/22  6:46 AM   Result Value Ref Range    PTT 72.5 (H) 24.1 - 35.1 SEC   POCT Glucose    Collection Time: 08/01/22  8:02 AM   Result Value Ref Range    POC Glucose 172 (H) 65 - 100 mg/dL    Performed by: Lidia Gonzales.            Microbiology:  Reviewed    Studies:  Reviewed    Signed By: Adeel Duggan MD     August 1, 2022

## 2022-08-01 NOTE — PROGRESS NOTES
OCCUPATIONAL THERAPY Daily Note     OT Visit Days: 2   Time  OT Charge Capture  Rehab Caseload Tracker  OT Orders    Americo Floyd is a 65 y.o. male   PRIMARY DIAGNOSIS: Back pain  Back pain [M54.9]  General weakness [R53.1]  Acute cystitis without hematuria [N30.00]  Low back pain without sciatica, unspecified back pain laterality, unspecified chronicity [M54.50]       Inpatient: Payor: MEDICARE / Plan: MEDICARE PART A AND B / Product Type: *No Product type* /     ASSESSMENT:     REHAB RECOMMENDATIONS: CURRENT LEVEL OF FUNCTION:  (Most Recently Demonstrated)   Recommendation to date pending progress:  Setting:  Short-term Rehab    Equipment:    To Be Determined Bathing:  Not Tested  Dressing: Total Assist  Feeding/Grooming:  Not Tested  Toileting:  Not Tested  Functional Mobility:  Not Tested     ASSESSMENT:  Mr. Blanca Santizo is doing fair today. Pt presents supine upon arrival. Pt required mod A x2 for bed mobility today. Pt demonstrates fair sitting balance at EOB. Pt was able to stand with min/mod A x2 and transfer over to chair. Pt instructed in UE exercises to increase strength and activity tolerance. Pt tolerated exercises fair. Left in chair with all needs in reach. Minimal progress made today. Will continue to benefit from skilled OT during stay.        SUBJECTIVE:     Mr. Blanca Santizo states, \"I use to free climb\"     Social/Functional Lives With: Spouse  Type of Home: House  Home Layout: Two level  Bathroom Toilet: Standard  Bathroom Equipment: Toilet raiser, Grab bars in shower  Bathroom Accessibility: Accessible  Home Equipment: [address] Help From: Family  ADL Assistance: Independent  Homemaking Assistance: Independent  Ambulation Assistance: Independent  Transfer Assistance: Independent  Active : Yes  Mode of Transportation: Car  Occupation: Retired    OBJECTIVE:     Luke Simple / Earvin Michele / AIRWAY: NA    RESTRICTIONS/PRECAUTIONS:           PAIN: Don Divers / O2:   Pre Treatment: Post Treatment: 0 Vitals          Oxygen            MOBILITY: I Mod I S SBA CGA Min Mod Max Total  NT x2 Comments:   Bed Mobility    Rolling [] [] [] [] [] [] [] [] [] [x] []    Supine to Sit [] [] [] [] [] [] [x] [] [] [] [x]    Scooting [] [] [] [] [] [x] [] [] [] [] []    Sit to Supine [] [] [] [] [] [] [] [] [] [x] []    Transfers    Sit to Stand [] [] [] [] [] [] [x] [] [] [] [x]    Bed to Chair [] [] [] [] [] [] [x] [] [] [] [x]    Stand to Sit [] [] [] [] [] [] [x] [] [] [] [x]    Tub/Shower [] [] [] [] [] [] [] [] [] [x] []     Toilet [] [] [] [] [] [] [] [] [] [x] []      [] [] [] [] [] [] [] [] [] [] []    I=Independent, Mod I=Modified Independent, S=Supervision/Setup, SBA=Standby Assistance, CGA=Contact Guard Assistance, Min=Minimal Assistance, Mod=Moderate Assistance, Max=Maximal Assistance, Total=Total Assistance, NT=Not Tested    ACTIVITIES OF DAILY LIVING: I Mod I S SBA CGA Min Mod Max Total NT Comments   BASIC ADLs:              Upper Body   Bathing [] [] [] [] [] [] [] [] [] [x]    Lower Body Bathing [] [] [] [] [] [] [] [] [] [x]    Toileting [] [] [] [] [] [] [] [] [] [x]    Upper Body Dressing [] [] [] [] [] [] [] [] [] [x]    Lower Body Dressing [] [] [] [] [] [] [] [] [] [x]    Feeding [] [] [] [] [] [] [] [] [] [x]    Grooming [] [] [] [] [] [] [] [] [] [x]    Personal Device Care [] [] [] [] [] [] [] [] [] [x]    Functional Mobility [] [] [] [] [] [] [] [] [] [x]    I=Independent, Mod I=Modified Independent, S=Supervision/Setup, SBA=Standby Assistance, CGA=Contact Guard Assistance, Min=Minimal Assistance, Mod=Moderate Assistance, Max=Maximal Assistance, Total=Total Assistance, NT=Not Tested    BALANCE: Good Fair+ Fair Fair- Poor NT Comments   Sitting Static [] [x] [] [] [] []    Sitting Dynamic [] [x] [] [] [] []              Standing Static [] [] [x] [] [] []    Standing Dynamic [] [] [] [x] [] []        PLAN:     FREQUENCY/DURATION   OT Plan of Care: 3 times/week for duration of hospital stay or until stated goals are met, whichever comes first.    ACUTE OCCUPATIONAL THERAPY GOALS:   (Developed with and agreed upon by patient and/or caregiver.)  1. Patient will complete lower body bathing and dressing with Min A and adaptive equipment as  needed. 2. Patient will completed upper body bathing and dressing with SBA and adaptive equipment as needed. 3. Patient will complete grooming standing at sink with Mod I and adaptive equipment as needed. 4. Patient will complete toileting with CGA and adaptive equipment as needed. 5. Patient will tolerate 30 minutes of OT treatment with 1-2 rest breaks to increase activity tolerance for ADLs. 6. Patient will complete functional transfers with SBA and adaptive equipment as needed. Timeframe: 7 visits      TREATMENT:     TREATMENT:   Therapeutic Exercise (10 Minutes): Therapeutic exercises noted below to improve functional activity tolerance, strength, and mobility. Neuromuscular Re-education (13 Minutes): Neuromuscular Re-education included Balance Training, Coordination training, Postural training, Sitting balance training, and Standing balance training to improve Balance, Coordination, Functional Mobility, and Postural Control.     TREATMENT GRID:   Date:  8/1/22 Date:   Date:     Activity/Exercise Parameters Parameters Parameters   Punches 15 reps     Elbow Flexion 10 reps     Shoulder Flexion 10 reps                                 AFTER TREATMENT PRECAUTIONS: Bed/Chair Locked, Call light within reach, Chair, Needs within reach, and Visitors at bedside    INTERDISCIPLINARY COLLABORATION:  RN/ PCT, PT/ PTA, and OT/ KATE    EDUCATION:       TOTAL TREATMENT DURATION AND TIME:  Time In: 1059  Time Out: [address]  Minutes: 23    COLETTE Reid

## 2022-08-01 NOTE — H&P
[facility] Cardiology Initial Cardiac Evaluation                Date of  Admission: 7/27/2022  3:41 PM     PCP: Nicholas Fitzpatrick MD  Requested by: Dr Willy Nettles  Primary Cardiologist: Dr Susie Lamb Attending: Dr Jenifer Tubbs    Reason for Evaluation: Dr Abdullahi Holliday is a 65 y.o. male with hx of Cardiomyopathy status post ICD, atrial fibrillation on Eliquis, diabetes type 2, obesity, PAD, anemia, COPD, asthma, SHERI on CPAP, diabetes type 2, PVCs, peripheral vascular disease, back problems status post L2-L4 kyphoplasty. Since patient's surgery patient's had multiple falls with MRI completed for ongoing pain and weakness. Jana Gtz was also consulted due to weakness in the legs. The patient was noted to have a positive UA on admission with a chest x-ray showing improving infiltrates and was COVID 19 negative. Patient was started on IV Rocephin. ID was consulted after MRI showed osteodiscitis and psoas abcess in the setting of kyphoplasty. Patient is being treated for acute osteomyelitis with anticipated 6 weeks of therapy. Eliquis and Plavix were discontinued by primary team and placed on a heparin drip for possible upcoming procedures. Interventional radiology has been consulted for possible drainage. Patient had an echocardiogram on 7/30/2022 which now shows heart failure with improved ejection fraction now 40 to 45% from less than 35% on 3/5/2022. Currently the patient is on heart failure proved beta-blocker 25 mg of Coreg twice daily, Lasix 20 mg tablet daily, and valsartan 320 mg daily. Recent Cardiac Synopsis    Echo:   Left Ventricle: Moderately reduced left ventricular systolic function with a visually estimated EF of 40 - 45%. Left ventricle is dilated. Normal wall thickness. Increased ventricular mass. Moderate hypokinesis of the following segments: basal anterolateral, basal anteroseptal, basal inferolateral and basal inferoseptal. Normal diastolic function. Aortic Valve: Tricuspid valve. Sclerosis of the aortic valve cusp. Mitral Valve: Mild regurgitation. Tricuspid Valve: The estimated RVSP is 27 mmHg. Left Atrium: Left atrium is dilated. Aorta: Dilated aortic root. Mildly dilated ascending aorta. Technical qualifiers: Color flow Doppler was performed and pulse wave and/or continuous wave Doppler was performed. Contrast used: Definity.   EKG: NSR with 1st degree HB      Past Medical History:   Diagnosis Date    Acute respiratory failure with hypoxia ([address] 10/23/2014    MINI (acute kidney injury) ([address] 09/15/2014    MINI (acute kidney injury) ([address]     MINI (acute kidney injury) ([address]     Allergic rhinitis 11/10/2015    Asthma 11/10/2015    Benign essential hypertension 11/10/2015    Benign neoplasm of colon 11/10/2015    CAD (coronary artery disease) 11/10/2015    CAD (coronary artery disease) 1998, 1999    mix2, 3 stents [id number]    CAP (community acquired pneumonia) 12/31/2020    Cardiomyopathy ([address] 70/38/3015    Complicated wound infection 11/10/2015    COPD (chronic obstructive pulmonary disease) with emphysema ([address] 11/10/2015    COPD exacerbation ([address] 10/12/2011    COVID-19 12/31/2020    Diabetes mellitus type 2, controlled ([address] 11/10/2015    Diabetic neuropathy ([address] 11/10/2015    Disruption of wound, unspecified 10/09/2014    Encounter for long-term (current) use of other high-risk medications 11/10/2015    Extremity atherosclerosis with intermittent claudication ([address] 11/10/2015    H/O endarterectomy 11/10/2015    Hiatal hernia 11/10/2015    History of 2019 novel coronavirus disease (COVID-19)     12/31/2020-    Hyperglycemia due to type 1 diabetes mellitus ([address] 11/10/2015    Hyperlipidemia 11/10/2015    ICD (implantable cardioverter-defibrillator) in place 06/16/2022    Monomorphic ventricular tachycardia ([address] 12/31/2020    Myocardial infarction ([address] 1999    Myocardial infarction ([address] 11/10/2015    Obesity 11/10/2015    Orthostatic hypotension 11/10/2015 Osteoarthritis 11/10/2015    Peripheral vascular disease ([facility] 11/10/2015    PNA (pneumonia) 02/08/2019    PVC (premature ventricular contraction)     Rash 72/72/1197    Seroma complicating a procedure 10/02/2014    Sleep apnea     cpap    Toe laceration     Type 2 diabetes with nephropathy ([facility]     Uncontrolled type 2 diabetes mellitus ([facility] 11/10/2015    Vertiginous syndromes and other disorders of vestibular system 11/10/2015      Past Surgical History:   Procedure Laterality Date    CARDIAC CATHETERIZATION  12/05/2018    LV EF=40%. LM:nl. LAD:30-40% mid stenosis. LCX OM1:95% stenosis. RCA:100% occlusion at RV branch.     CORONARY ANGIOPLASTY WITH STENT PLACEMENT  12/05/2018    LCX OM1:2.5 x 12 Russell RAKESH    CORONARY ANGIOPLASTY WITH STENT PLACEMENT  1999    CT ABDOMEN SURGERY [facility]    abdominal cyst removed    CT CARDIAC SURG PROCEDURE UNLIST  1999    stents x3    SPINE SURGERY N/A 7/19/2022    LUMBAR 2, LUMBAR 4 KYPHOPLASTY AND ANY OTHER INDICATED LEVELS performed by Dylon Stafford MD at [facility] MAIN OR    VASCULAR SURGERY  11/5/14    Repair of right common femoral artery     VASCULAR SURGERY  9/11/14    tiffanie femoral endarterectomy     Allergies   Allergen Reactions    Azithromycin Hives    Oxaprozin Other (See Comments)     ELEVATED BLOOD PRESSURE      Family History   Problem Relation Age of Onset    Breast Cancer Mother     Hypertension Mother     Other Father         DIVERTICULITIS    Crohn's Disease Sister     Lung Disease Brother         mesothioloma    Diabetes Sister     Heart Attack Father     Heart Disease Father     Stroke Mother       Social History       Tobacco History       Smoking Status  Former Quit Date  10/2/2011 Smoking Frequency  1.00 packs/day Smoking Tobacco Type  Cigarettes quit in 10/2/2011      Smokeless Tobacco Use  Current              Alcohol History       Alcohol Use Status  Yes Amount  0.0 standard drinks of alcohol/wk              Drug Use       Drug Use Status  No Sexual Activity       Sexually Active  Not Asked                     Medications Prior to Admission: fluticasone-salmeterol (ADVAIR) 250-50 MCG/ACT AEPB diskus inhaler, Inhale 1 puff into the lungs in the morning and at bedtime  calcitonin (MIACALCIN) 200 UNIT/ACT nasal spray, 1 spray by Nasal route daily  albuterol sulfate  (90 Base) MCG/ACT inhaler, USE 2 PUFFS BY INHALATION 4 TIMES DAILY AS NEEDED FOR WHEEZING OR SHORTNESS OF BREATH.  amiodarone (CORDARONE) 200 MG tablet, Take 200 mg by mouth 2 times daily  apixaban (ELIQUIS) 5 MG TABS tablet, Take 5 mg by mouth every 12 hours  ascorbic acid (VITAMIN C) 500 MG tablet, Take 500 mg by mouth  carvedilol (COREG) 25 MG tablet, Take 25 mg by mouth 2 times daily (with meals)  Cholecalciferol 50 MCG (2000 UT) TABS, Take 2,000 Units by mouth  clopidogrel (PLAVIX) 75 MG tablet, Take 75 mg by mouth daily  fluticasone (FLONASE) 50 MCG/ACT nasal spray, USE 1 OR 2 SPRAYS IN EACH NOSTRIL EVERY DAY.   furosemide (LASIX) 40 MG tablet, Take 20 mg by mouth daily TAKE 1 TABLET BY MOUTH EVERY DAY  glipiZIDE (GLUCOTROL XL) 10 MG extended release tablet, TAKE 1 TABLET BY MOUTH TWICE DAILY  glucosamine-chondroitin 750-600 MG TABS tablet, Take by mouth 2 times daily  guaiFENesin 1200 MG TB12, Take 1,200 mg by mouth 2 times daily as needed  latanoprost (XALATAN) 0.005 % ophthalmic solution, Apply 1 drop to eye  magnesium oxide (MAG-OX) 400 (240 Mg) MG tablet, TAKE 1 TABLET BY MOUTH EVERY DAY  metFORMIN (GLUCOPHAGE) 500 MG tablet, TAKE TWO TABLETS BY MOUTH 2 TIMES A DAY  montelukast (SINGULAIR) 10 MG tablet, TAKE 1 TABLET BY MOUTH EVERY DAY AT BEDTIME  SITagliptin (JANUVIA) 100 MG tablet, TAKE 1 TABLET BY MOUTH EVERY DAY  valsartan (DIOVAN) 320 MG tablet, Take 320 mg by mouth daily  [DISCONTINUED] rosuvastatin (CRESTOR) 10 MG tablet, TAKE ONE TABLET BY MOUTH ONCE A DAY  HYDROcodone-acetaminophen (NORCO) 7.5-325 MG per tablet, Take 1 tablet by mouth every 6 hours as needed for Pain for up to 7 days. Intended supply: 7 days.  Take lowest dose possible to manage pain  tiZANidine (ZANAFLEX) 2 MG tablet, Take 1 tablet by mouth every 8 hours as needed (muscle spasms)  nitroGLYCERIN (NITROSTAT) 0.4 MG SL tablet, Place 0.4 mg under the tongue  polyethylene glycol (GLYCOLAX) 17 GM/SCOOP powder, Take 17 g by mouth daily     Current Facility-Administered Medications   Medication Dose Route Frequency    heparin (porcine) injection 2,000 Units  2,000 Units IntraVENous Once    oxyCODONE-acetaminophen (PERCOCET) 5-325 MG per tablet 2 tablet  2 tablet Oral Q4H PRN    morphine (MS CONTIN) extended release tablet 15 mg  15 mg Oral 2 times per day    tamsulosin (FLOMAX) capsule 0.4 mg  0.4 mg Oral Daily    heparin (porcine) injection 4,000 Units  4,000 Units IntraVENous PRN    heparin (porcine) injection 2,000 Units  2,000 Units IntraVENous PRN    heparin 25,000 units in dextrose 5% 250 mL (premix) infusion  5-30 Units/kg/hr IntraVENous Continuous    albuterol sulfate HFA (PROVENTIL;VENTOLIN;PROAIR) 108 (90 Base) MCG/ACT inhaler 2 puff  2 puff Inhalation Q4H PRN    amiodarone (CORDARONE) tablet 200 mg  200 mg Oral BID    [Held by provider] apixaban (ELIQUIS) tablet 5 mg  5 mg Oral 2 times per day    calcitonin (MIACALCIN) nasal spray 1 spray  1 spray Alternating Nares Daily    carvedilol (COREG) tablet 25 mg  25 mg Oral BID WC    [Held by provider] clopidogrel (PLAVIX) tablet 75 mg  75 mg Oral Daily    fluticasone (FLONASE) 50 MCG/ACT nasal spray 2 spray  2 spray Each Nostril Daily    mometasone-formoterol (DULERA) 200-5 MCG/ACT inhaler 2 puff  2 puff Inhalation BID    furosemide (LASIX) tablet 20 mg  20 mg Oral Daily    latanoprost (XALATAN) 0.005 % ophthalmic solution 1 drop  1 drop Ophthalmic Daily    montelukast (SINGULAIR) tablet 10 mg  10 mg Oral Nightly    [Held by provider] rosuvastatin (CRESTOR) tablet 10 mg  10 mg Oral Daily    valsartan (DIOVAN) tablet 320 mg  320 mg Oral Daily    sodium chloride flush 0.9 % injection 5-40 mL  5-40 mL IntraVENous 2 times per day    sodium chloride flush 0.9 % injection 5-40 mL  5-40 mL IntraVENous PRN    0.9 % sodium chloride infusion   IntraVENous PRN    ondansetron (ZOFRAN-ODT) disintegrating tablet 4 mg  4 mg Oral Q8H PRN    Or    ondansetron (ZOFRAN) injection 4 mg  4 mg IntraVENous Q6H PRN    polyethylene glycol (GLYCOLAX) packet 17 g  17 g Oral Daily PRN    acetaminophen (TYLENOL) tablet 650 mg  650 mg Oral Q6H PRN    Or    acetaminophen (TYLENOL) suppository 650 mg  650 mg Rectal Q6H PRN    glucose chewable tablet 16 g  4 tablet Oral PRN    dextrose bolus 10% 125 mL  125 mL IntraVENous PRN    Or    dextrose bolus 10% 250 mL  250 mL IntraVENous PRN    glucagon (rDNA) injection 1 mg  1 mg SubCUTAneous PRN    dextrose 10 % infusion   IntraVENous Continuous PRN    insulin lispro (HUMALOG) injection vial 0-4 Units  0-4 Units SubCUTAneous TID WC    insulin lispro (HUMALOG) injection vial 0-4 Units  0-4 Units SubCUTAneous Nightly     Facility-Administered Medications Ordered in Other Encounters   Medication Dose Route Frequency    gadoteridol (PROHANCE) injection 23 mL  23 mL IntraVENous ONCE PRN    sodium chloride flush 0.9 % injection 10 mL  10 mL IntraVENous PRN       Review of Systems   Constitutional: Positive for malaise/fatigue. Negative for chills, diaphoresis, fever, night sweats, weight gain and weight loss. HENT: Negative. Eyes: Negative. Cardiovascular:  Positive for claudication. Respiratory: Negative. Endocrine: Negative. Hematologic/Lymphatic: Negative. Skin: Negative. Musculoskeletal:  Positive for back pain. Gastrointestinal:  Negative for heartburn, nausea and vomiting. Genitourinary: Negative. Neurological: Negative. Negative for dizziness and weakness. Psychiatric/Behavioral: Negative.         Physical Exam  Vitals:    08/01/22 0800 08/01/22 1015 08/01/22 1219 08/01/22 1405   BP: (!) 149/75  (!) 140/77 (!) 159/87   Pulse: Platelets 509 371 - 122 K/uL    MPV 10.3 9.4 - 12.3 FL    nRBC 0.00 0.0 - 0.2 K/uL    Differential Type AUTOMATED      Seg Neutrophils 78 43 - 78 %    Lymphocytes 12 (L) 13 - 44 %    Monocytes 8 4.0 - 12.0 %    Eosinophils % 0 (L) 0.5 - 7.8 %    Basophils 0 0.0 - 2.0 %    Immature Granulocytes 2 0.0 - 5.0 %    Segs Absolute 6.0 1.7 - 8.2 K/UL    Absolute Lymph # 0.9 0.5 - 4.6 K/UL    Absolute Mono # 0.6 0.1 - 1.3 K/UL    Absolute Eos # 0.0 0.0 - 0.8 K/UL    Basophils Absolute 0.0 0.0 - 0.2 K/UL    Absolute Immature Granulocyte 0.2 0.0 - 0.5 K/UL   Comprehensive Metabolic Panel w/ Reflex to MG    Collection Time: 08/01/22  6:46 AM   Result Value Ref Range    Sodium 130 (L) 136 - 145 mmol/L    Potassium 3.6 3.5 - 5.1 mmol/L    Chloride 96 (L) 98 - 107 mmol/L    CO2 33 (H) 21 - 32 mmol/L    Anion Gap 1 (L) 7 - 16 mmol/L    Glucose 171 (H) 65 - 100 mg/dL    BUN 7 (L) 8 - 23 MG/DL    Creatinine 0.60 (L) 0.8 - 1.5 MG/DL    GFR African American >60 >60 ml/min/1.73m2    GFR Non- >60 >60 ml/min/1.73m2    Calcium 8.7 8.3 - 10.4 MG/DL    Total Bilirubin 0.6 0.2 - 1.1 MG/DL     (H) 12 - 65 U/L    AST 70 (H) 15 - 37 U/L    Alk Phosphatase 140 (H) 50 - 136 U/L    Total Protein 6.1 (L) 6.3 - 8.2 g/dL    Albumin 2.1 (L) 3.2 - 4.6 g/dL    Globulin 4.0 (H) 2.3 - 3.5 g/dL    Albumin/Globulin Ratio 0.5 (L) 1.2 - 3.5     Phosphorus    Collection Time: 08/01/22  6:46 AM   Result Value Ref Range    Phosphorus 3.0 2.3 - 3.7 MG/DL   APTT    Collection Time: 08/01/22  6:46 AM   Result Value Ref Range    PTT 72.5 (H) 24.1 - 35.1 SEC   POCT Glucose    Collection Time: 08/01/22  8:02 AM   Result Value Ref Range    POC Glucose 172 (H) 65 - 100 mg/dL    Performed by: [employer].     C-Reactive Protein    Collection Time: 08/01/22  9:51 AM   Result Value Ref Range    CRP 8.9 (H) 0.0 - 0.9 mg/dL   Sedimentation Rate    Collection Time: 08/01/22  9:51 AM   Result Value Ref Range    Sed Rate, Automated 42 (H) 15 mm/hr malalignment aside from the compression deformities. Multiple levels of probable spinal canal stenosis as described on recent MRI. MRI CERVICAL SPINE WO CONTRAST    Result Date: 7/29/2022  EXAMINATION: MRI CERVICAL SPINE WO CONTRAST 7/29/2022 1:30 PM ACCESSION NUMBER: [id number] COMPARISON: Same-day brain MRI. INDICATION: Leg claudication TECHNIQUE: Multi-sequence, multi-planar MR images were obtained of the cervical spine without the administration of intravenous contrast. FINDINGS: The visualized posterior fossa is better evaluated on same-day MRI brain. The spinal cord demonstrates normal signal and morphology. Vertebral body heights are maintained. Mild anterolisthesis of C4 on C5 and C5 on C6. No acute osseous abnormality. No suspicious osseous lesion. Multilevel intervertebral disc height loss. Multilevel facet arthropathy. The paravertebral soft tissues are within normal limits. Level specific findings: C2-C3: Posterior disc bulge with superimposed right paracentral protrusion and uncovertebral hypertrophy. Mild spinal canal narrowing. Moderate right-sided neural foraminal narrowing. No left neural foraminal narrowing. C3-C4: Facet arthropathy and uncovertebral hypertrophy. Mild spinal canal narrowing. Moderate left and mild right neural foraminal narrowing. C4-C5: Disc osteophyte complex with central disc protrusion which indents the ventral cord and results in moderate to severe spinal canal. Facet arthropathy, and uncovertebral hypertrophy. Moderate right and severe left neural foraminal narrowing. C5-C6: Posterior disc osteophyte complex, likely due to hypertrophy, and facet arthropathy resulting in mild spinal canal narrowing. Moderate left and mild right neural foraminal narrowing. C6-C7: Posterior disc osteophyte complex, uncovertebral hypertrophy, and facet arthropathy. Mild spinal canal narrowing. Moderate bilateral neural foraminal narrowing. C7-T1: No canal or foraminal stenosis.      1. Multilevel degenerative changes of the cervical spine resulting in multilevel spinal canal narrowing, most pronounced at T4-C5, where there is moderate to severe spinal canal narrowing. High-grade multilevel neural foraminal narrowing, as detailed above. 2. Mild anterolisthesis of C4 on C5 and C5 on C6, likely degenerative. MRI LUMBAR SPINE W WO CONTRAST    Result Date: 7/29/2022  EXAMINATION: MRI LUMBAR SPINE W WO CONTRAST 7/29/2022 2:21 PM ACCESSION NUMBER: [id number] COMPARISON: CT lumbar spine 7/27/2021. Lumbar spine MRI 6/2/2022. INDICATION: lower back pain after kyphoplasty TECHNIQUE: Multisequence, multiplanar MR images were obtained of the lumbar spine with and without the administration of 23 mL of ProHance intravenous contrast. FINDINGS: For the purposes of this dictation, the last well-formed disc space is presumed to be L5-S1. The visualized spinal cord is unremarkable. The conus medullaris terminates at a normal level. Post procedural changes from prior L2 and L4 vertebral body kyphoplasties. There is confluent decreased T1 and increased T2 signal within the L2 vertebral body, the L4 vertebral body, as well as much of the L3 vertebral body. New increased T2 signal within the L2-L3 and L3-L4 disc spaces. There is posterior epidural phlegmonous change extending from L2-L4 with a a small posterior epidural abscess at the level of L2-L3 measuring approximately 8 mm AP by 9 mm craniocaudal (series 10, image 16, series 9, image 10). The paravertebral phlegmonous change superimposed on the pre-existing degenerative changes results in severe spinal canal narrowing L2-L3 and L3-L4, which is slightly worsened from prior MRI. Paravertebral phlegmonous change and enhancement in the soft tissues at these levels, with a rim-enhancing fluid collection in the proximal left psoas muscle at the level of the L2-L3 disc space measuring 1.9 x 1.1 x 3.2 cm (series 10, image 17, series 9, image 17).  No other abnormal osseous signal. Advanced multilevel degenerative disease and facet arthropathy at the remaining lumbar levels is similar to prior with moderate spinal canal narrowing at L1-L2 and L4-L5 and multilevel bilateral neural foraminal narrowing. 1. Sequela of old L2 and L4 kyphoplasty is with interval development of discitis/osteomyelitis extending from L2 to L4 with prominent paravertebral phlegmonous change, including a small left psoas muscle abscess. 2. Posterior epidural phlegmonous change from L2-L4 with a small posterior epidural abscess at the level of L2-L3. This superimposed on the preexisting degenerative changes results in severe spinal canal narrowing at L2-L3 and L3-L4, which is slightly worsened from prior MRI. 3. Multilevel degenerative changes of the remainder of the lumbar spine with multilevel spinal canal and neural foraminal narrowing. DC5 The critical results contained in this report were communicated to Yin Devlin RN by Dr. Wagner Ahuja on 7/29/2022 at 6:29 PM. Critical results were communicated as outlined in Section II.C.2.a.i of the ACR Practice Guideline for Communication of Diagnostic Imaging Findings. XR CHEST PORTABLE    Result Date: 7/27/2022  XR CHEST PORTABLE 7/27/2022 6:35 PM HISTORY: Weakness. COMPARISON: Chest x-ray 3/7/2022. A portable AP view of the chest was obtained. Pulmonary infiltrate predominantly in the right upper lobe and mid to lower left lung has mostly cleared. Atelectasis or scarring right lung base appears stable. Stable cardiomegaly. Implantable cardiac device left chest and both leads are unchanged. No pneumothorax. No pleural effusions. MRI BRAIN WO CONTRAST    Result Date: 7/29/2022  Exam: MRI BRAIN WO CONTRAST on 7/29/2022 1:24 PM Clinical History: The Male patient is 65years old presenting for abnormal gait and bilateral lower extremity weakness.  Comparison:  Head CT 5/11/2021 Technique:  Axial T2, axial FLAIR, axial diffusion-weighted, sagittal T1 and coronal gradient-echo scans were performed. Findings: Age-related senescent changes are seen with sulcal and ventricular prominence. There is chronic periventricular white matter disease with lacunae throughout the corona radiata and centrum semiovale. No evidence of restricted diffusion is seen to suggest acute ischemia. There are no abnormal extra-axial fluid collections. No evidence of mass or mass effect is seen. Expected flow voids are maintained in the major intracranial vessels. Cerebellar involutional changes are demonstrated. Visualized brainstem structures are unremarkable. There is no evidence of Chiari malformation. The ventricular system and CSF containing spaces are unremarkable in appearance. Visualized extracranial soft tissues are unremarkable. The paranasal sinuses are well pneumatized and aerated. 1.  Age-related senescent changes and chronic microvascular disease without acute intracranial abnormality. CPT code(s) [id number]     Transthoracic echocardiogram (TTE) complete with contrast, bubble, strain, and 3D PRN    Result Date: 7/30/2022  Formatting of this result is different from the original.   Left Ventricle: Moderately reduced left ventricular systolic function with a visually estimated EF of 40 - 45%. Left ventricle is dilated. Normal wall thickness. Increased ventricular mass. Moderate hypokinesis of the following segments: basal anterolateral, basal anteroseptal, basal inferolateral and basal inferoseptal. Normal diastolic function. Aortic Valve: Tricuspid valve. Sclerosis of the aortic valve cusp. Mitral Valve: Mild regurgitation. Tricuspid Valve: The estimated RVSP is 27 mmHg. Left Atrium: Left atrium is dilated. Aorta: Dilated aortic root. Mildly dilated ascending aorta. Technical qualifiers: Color flow Doppler was performed and pulse wave and/or continuous wave Doppler was performed. Contrast used: Definity.  RVSP=27mmHg     Vascular duplex lower extremity arteries bilateral with JOVANA    Result Date: 7/7/2022    Right lower extremity: The JOVANA (ankle-brachial index) is 0.78 and is abnormal. The lower extremity arterial duplex reveals an occlusion of the proximal superficial femoral artery with reconstitution at the distal proximal superficial femoral artery. There is monophasic flow in the GILBERTO, PTA and peroneal arteries at the ankle. The great toe pressure is 64mmHg. Left lower extremity: Right lower extremity: The JOVANA (ankle-brachial index) is 0.60 and is abnormal. The lower extremity arterial duplex reveals an occlusion of the proximal superficial femoral artery with reconstitution at the below knee popliteal artery. There is monophasic flow in the GILBERTO, PTA and peroneal arteries at the ankle. The great toe pressure is 63mmHg. The abdominal aorta was evaluated and measured 2.8cm x 2.9cm at its maximum diameter, no aneurysm visualized on limited evaluation of abdominal aorta. [facility] XR TECHNOLOGIST SERVICE    Result Date: 7/19/2022  Radiology exam is complete. No Radiologist dictation. Please follow up with ordering provider. Initial Recommendations:      Cardiac Problems:    Spinal abscess: Per primary team, Ortho, and IR. Infectious disease on board IV antibiotics given for approximately treatment, 6 weeks. With history of heart failure with reduced ejection fraction now heart failure with improved ejection fraction would avoid over use of IV fluid during procedures. History of heart failure reduced ejection fraction now heart failure with improved ejection fraction: Continue heart failure approved beta-blocker 25 mg Coreg twice daily, continue  mg of losartan daily, continue current Lasix dose and can adjust as needed with patient's volume status. Further recommendations pending clinical course and attending recommendations.       CAD: Continue beta-blocker and ARB but would hold off statin with elevated LFTs at this time.    Diabetes mellitus: Per primary team    Anemia: Per primary team              Thank you very much for requesting a Cardiology Evaluation. We appreciate the opportunity to participate in this patient's care. We will follow along with above stated plan. This is initial management plan but please see attendings consult note for full plan of care.     Sixto Mcclellan, AARON - CNP AGACNP-BC

## 2022-08-01 NOTE — PROGRESS NOTES
's initial visit to convey care and concern. Mr. Lucrecia Gill and his wife Dwayne Matson were anticipating a procedure/surgery today. Mr. Lucrecia Gill has been unable to walk. I offered emotional/spiritual support including prayer as requested for patient, family, and staff.       Angel Hamm 67  Board Certified

## 2022-08-01 NOTE — PRE SEDATION
Sedation Pre-Procedure Note    Patient Name: Zenia Alvarenga   YOB: 1945  Room/Bed: [facility]  Medical Record Number: [id number]  Date: 8/1/2022   Time: 2:59 PM       Indication:  Osteomyelitis of the lumbar spine    Consent: I have discussed with the patient and/or the patient representative the indication, alternatives, and the possible risks and/or complications of the planned procedure and the anesthesia methods. The patient and/or patient representative appear to understand and agree to proceed.     Vital Signs:   Vitals:    08/01/22 1405   BP: (!) 159/87   Pulse: 61   Resp: 16   Temp: 98.2 °F (36.8 °C)   SpO2: 94%       Past Medical History:   has a past medical history of Acute respiratory failure with hypoxia (HCC), MINI (acute kidney injury) ([address] MINI (acute kidney injury) ([address] MINI (acute kidney injury) ([address] Allergic rhinitis, Asthma, Benign essential hypertension, Benign neoplasm of colon, CAD (coronary artery disease), CAD (coronary artery disease), CAP (community acquired pneumonia), Cardiomyopathy ([address] Complicated wound infection, COPD (chronic obstructive pulmonary disease) with emphysema ([address] COPD exacerbation ([address] COVID-19, Diabetes mellitus type 2, controlled ([address] Diabetic neuropathy ([address] Disruption of wound, unspecified, Encounter for long-term (current) use of other high-risk medications, Extremity atherosclerosis with intermittent claudication ([address] H/O endarterectomy, Hiatal hernia, History of 2019 novel coronavirus disease (COVID-19), Hyperglycemia due to type 1 diabetes mellitus ([address] Hyperlipidemia, ICD (implantable cardioverter-defibrillator) in place, Monomorphic ventricular tachycardia ([address] Myocardial infarction ([address] Myocardial infarction ([address] Obesity, Orthostatic hypotension, Osteoarthritis, Peripheral vascular disease ([address] PNA (pneumonia), PVC (premature ventricular contraction), Rash, Seroma complicating a procedure, Sleep apnea, Toe laceration, Type 2 into the lungs in the morning and at bedtime 6/30/22  Yes Historical Provider, MD   calcitonin (MIACALCIN) 200 UNIT/ACT nasal spray 1 spray by Nasal route daily 6/3/22  Yes Viv Knapp APRN - CNP   albuterol sulfate  (90 Base) MCG/ACT inhaler USE 2 PUFFS BY INHALATION 4 TIMES DAILY AS NEEDED FOR WHEEZING OR SHORTNESS OF BREATH. 1/14/22  Yes Ar Automatic Reconciliation   amiodarone (CORDARONE) 200 MG tablet Take 200 mg by mouth 2 times daily 3/10/22  Yes Ar Automatic Reconciliation   apixaban (ELIQUIS) 5 MG TABS tablet Take 5 mg by mouth every 12 hours 3/10/22  Yes Ar Automatic Reconciliation   ascorbic acid (VITAMIN C) 500 MG tablet Take 500 mg by mouth   Yes Ar Automatic Reconciliation   carvedilol (COREG) 25 MG tablet Take 25 mg by mouth 2 times daily (with meals) 3/10/22  Yes Ar Automatic Reconciliation   Cholecalciferol 50 MCG (2000 UT) TABS Take 2,000 Units by mouth   Yes Ar Automatic Reconciliation   clopidogrel (PLAVIX) 75 MG tablet Take 75 mg by mouth daily 3/11/22  Yes Ar Automatic Reconciliation   fluticasone (FLONASE) 50 MCG/ACT nasal spray USE 1 OR 2 SPRAYS IN EACH NOSTRIL EVERY DAY. 3/15/22  Yes Ar Automatic Reconciliation   furosemide (LASIX) 40 MG tablet Take 20 mg by mouth daily TAKE 1 TABLET BY MOUTH EVERY DAY 2/7/22  Yes Ar Automatic Reconciliation   glipiZIDE (GLUCOTROL XL) 10 MG extended release tablet TAKE 1 TABLET BY MOUTH TWICE DAILY 1/14/22  Yes Ar Automatic Reconciliation   glucosamine-chondroitin 750-600 MG TABS tablet Take by mouth 2 times daily   Yes Ar Automatic Reconciliation   guaiFENesin 1200 MG TB12 Take 1,200 mg by mouth 2 times daily as needed   Yes Ar Automatic Reconciliation   latanoprost (XALATAN) 0.005 % ophthalmic solution Apply 1 drop to eye   Yes Ar Automatic Reconciliation   magnesium oxide (MAG-OX) 400 (240 Mg) MG tablet TAKE 1 TABLET BY MOUTH EVERY DAY 1/14/22  Yes Ar Automatic Reconciliation   metFORMIN (GLUCOPHAGE) 500 MG tablet TAKE TWO TABLETS BY MOUTH 2 TIMES A DAY 3/23/22  Yes Ar Automatic Reconciliation   montelukast (SINGULAIR) 10 MG tablet TAKE 1 TABLET BY MOUTH EVERY DAY AT BEDTIME 3/15/22  Yes Ar Automatic Reconciliation   SITagliptin (JANUVIA) 100 MG tablet TAKE 1 TABLET BY MOUTH EVERY DAY 3/15/22  Yes Ar Automatic Reconciliation   valsartan (DIOVAN) 320 MG tablet Take 320 mg by mouth daily 3/10/22  Yes Ar Automatic Reconciliation   rosuvastatin (CRESTOR) 10 MG tablet TAKE 1 TABLET BY MOUTH EVERY DAY 7/29/22   Dylon Moses MD   HYDROcodone-acetaminophen ([address] [employer]) 7.5-325 MG per tablet Take 1 tablet by mouth every 6 hours as needed for Pain for up to 7 days. Intended supply: 7 days. Take lowest dose possible to manage pain 7/27/22 8/3/22  MYRIAM Damon III, MD   tiZANidine (ZANAFLEX) 2 MG tablet Take 1 tablet by mouth every 8 hours as needed (muscle spasms) 7/25/22   Dylon Moses MD   nitroGLYCERIN (NITROSTAT) 0.4 MG SL tablet Place 0.4 mg under the tongue 3/11/22   Ar Automatic Reconciliation   polyethylene glycol (GLYCOLAX) 17 GM/SCOOP powder Take 17 g by mouth daily 9/21/21   Ar Automatic Reconciliation     Coumadin Use Last 7 Days:  no  Antiplatelet drug therapy use last 7 days: yes - Plavix  Other anticoagulant use last 7 days: yes - Eliquis     Pre-Sedation Documentation and Exam:   I have personally completed a history, physical exam & review of systems for this patient (see notes).     Mallampati Airway Assessment:  Mallampati Class II - (soft palate, fauces & uvula are visible)    Prior History of Anesthesia Complications:   none    ASA Classification:  Class 3 - A patient with severe systemic disease that limits activity but is not incapacitating    Sedation/ Anesthesia Plan:   intravenous sedation    Medications Planned:   midazolam (Versed) intravenously and fentanyl intravenously    Patient is an appropriate candidate for plan of sedation: yes    Electronically signed by Delores Moeller MD on 8/1/2022 at 2:59 PM

## 2022-08-01 NOTE — PROGRESS NOTES
Spine    AF,VSS  Patient still complaining of pain and not much improvement  Moves LE's   Patient sitting in chair  Lumbar MRI showed Discitis in L2-3 and L3-4 disc, but no significant epidural abscess. He does have known spinal stenosis. Patient is to have aspiration and culture of disc spaces today to try to determine the exact nature of infection. Will follow patient and cultures.

## 2022-08-01 NOTE — CARE COORDINATION
Pt discussed during IDR and chart screened by CM for d/c planning. CM f/u on the STR referrals that we sent on 7/30. [facility] declined referral stating no availability. [facility] is still unable to receive referrals in [facility] and requested that CM send the referral to [facility] so that it can be reviewed. CM re-sent the referral.  Today pt underwent an aspiration of small abscess adjacent to the L2-L3 disc space on the L.  3 ml of purulent fluid was removed and sent to pathology. Awaiting Cx results. Currently anticipate d/c by the end of this week. CM will continue to follow and remain available if any needs arise. Update: [facility] has accepted pt's referral if pt and spouse are in agreement.

## 2022-08-02 LAB
ALBUMIN SERPL-MCNC: 2.1 G/DL (ref 3.2–4.6)
ALBUMIN/GLOB SERPL: 0.5 {RATIO} (ref 1.2–3.5)
ALP SERPL-CCNC: 137 U/L (ref 50–136)
ALT SERPL-CCNC: 196 U/L (ref 12–65)
ANION GAP SERPL CALC-SCNC: 5 MMOL/L (ref 7–16)
APTT PPP: 63.6 SEC (ref 24.1–35.1)
AST SERPL-CCNC: 50 U/L (ref 15–37)
BASOPHILS # BLD: 0.1 K/UL (ref 0–0.2)
BASOPHILS NFR BLD: 1 % (ref 0–2)
BILIRUB SERPL-MCNC: 0.7 MG/DL (ref 0.2–1.1)
BUN SERPL-MCNC: 8 MG/DL (ref 8–23)
CALCIUM SERPL-MCNC: 8.5 MG/DL (ref 8.3–10.4)
CHLORIDE SERPL-SCNC: 97 MMOL/L (ref 98–107)
CO2 SERPL-SCNC: 32 MMOL/L (ref 21–32)
CREAT SERPL-MCNC: 0.6 MG/DL (ref 0.8–1.5)
DIFFERENTIAL METHOD BLD: ABNORMAL
EOSINOPHIL # BLD: 0 K/UL (ref 0–0.8)
EOSINOPHIL NFR BLD: 0 % (ref 0.5–7.8)
ERYTHROCYTE [DISTWIDTH] IN BLOOD BY AUTOMATED COUNT: 14.8 % (ref 11.9–14.6)
GLOBULIN SER CALC-MCNC: 4 G/DL (ref 2.3–3.5)
GLUCOSE BLD STRIP.AUTO-MCNC: 159 MG/DL (ref 65–100)
GLUCOSE BLD STRIP.AUTO-MCNC: 172 MG/DL (ref 65–100)
GLUCOSE BLD STRIP.AUTO-MCNC: 190 MG/DL (ref 65–100)
GLUCOSE BLD STRIP.AUTO-MCNC: 197 MG/DL (ref 65–100)
GLUCOSE SERPL-MCNC: 161 MG/DL (ref 65–100)
HCT VFR BLD AUTO: 27.1 % (ref 41.1–50.3)
HGB BLD-MCNC: 8.9 G/DL (ref 13.6–17.2)
IMM GRANULOCYTES # BLD AUTO: 0.1 K/UL (ref 0–0.5)
IMM GRANULOCYTES NFR BLD AUTO: 2 % (ref 0–5)
LYMPHOCYTES # BLD: 1 K/UL (ref 0.5–4.6)
LYMPHOCYTES NFR BLD: 14 % (ref 13–44)
MCH RBC QN AUTO: 31.4 PG (ref 26.1–32.9)
MCHC RBC AUTO-ENTMCNC: 32.8 G/DL (ref 31.4–35)
MCV RBC AUTO: 95.8 FL (ref 79.6–97.8)
MONOCYTES # BLD: 0.7 K/UL (ref 0.1–1.3)
MONOCYTES NFR BLD: 9 % (ref 4–12)
NEUTS SEG # BLD: 5.4 K/UL (ref 1.7–8.2)
NEUTS SEG NFR BLD: 74 % (ref 43–78)
NRBC # BLD: 0 K/UL (ref 0–0.2)
PHOSPHATE SERPL-MCNC: 3.4 MG/DL (ref 2.3–3.7)
PLATELET # BLD AUTO: 254 K/UL (ref 150–450)
PMV BLD AUTO: 10 FL (ref 9.4–12.3)
POTASSIUM SERPL-SCNC: 4.1 MMOL/L (ref 3.5–5.1)
PROT SERPL-MCNC: 6.1 G/DL (ref 6.3–8.2)
RBC # BLD AUTO: 2.83 M/UL (ref 4.23–5.6)
SERVICE CMNT-IMP: ABNORMAL
SODIUM SERPL-SCNC: 134 MMOL/L (ref 136–145)
UFH PPP CHRO-ACNC: 0.5 IU/ML (ref 0.3–0.7)
UFH PPP CHRO-ACNC: >1.1 IU/ML (ref 0.3–0.7)
WBC # BLD AUTO: 7.2 K/UL (ref 4.3–11.1)

## 2022-08-02 PROCEDURE — 85025 COMPLETE CBC W/AUTO DIFF WBC: CPT

## 2022-08-02 PROCEDURE — 2580000003 HC RX 258: Performed by: STUDENT IN AN ORGANIZED HEALTH CARE EDUCATION/TRAINING PROGRAM

## 2022-08-02 PROCEDURE — 6360000002 HC RX W HCPCS: Performed by: STUDENT IN AN ORGANIZED HEALTH CARE EDUCATION/TRAINING PROGRAM

## 2022-08-02 PROCEDURE — 85730 THROMBOPLASTIN TIME PARTIAL: CPT

## 2022-08-02 PROCEDURE — 84100 ASSAY OF PHOSPHORUS: CPT

## 2022-08-02 PROCEDURE — 36415 COLL VENOUS BLD VENIPUNCTURE: CPT

## 2022-08-02 PROCEDURE — 80053 COMPREHEN METABOLIC PANEL: CPT

## 2022-08-02 PROCEDURE — 94760 N-INVAS EAR/PLS OXIMETRY 1: CPT

## 2022-08-02 PROCEDURE — 6360000002 HC RX W HCPCS: Performed by: INTERNAL MEDICINE

## 2022-08-02 PROCEDURE — 6370000000 HC RX 637 (ALT 250 FOR IP): Performed by: INTERNAL MEDICINE

## 2022-08-02 PROCEDURE — 82962 GLUCOSE BLOOD TEST: CPT

## 2022-08-02 PROCEDURE — 6370000000 HC RX 637 (ALT 250 FOR IP): Performed by: NURSE PRACTITIONER

## 2022-08-02 PROCEDURE — 94640 AIRWAY INHALATION TREATMENT: CPT

## 2022-08-02 PROCEDURE — 6370000000 HC RX 637 (ALT 250 FOR IP): Performed by: STUDENT IN AN ORGANIZED HEALTH CARE EDUCATION/TRAINING PROGRAM

## 2022-08-02 PROCEDURE — 99232 SBSQ HOSP IP/OBS MODERATE 35: CPT | Performed by: INTERNAL MEDICINE

## 2022-08-02 PROCEDURE — 85520 HEPARIN ASSAY: CPT

## 2022-08-02 PROCEDURE — 2580000003 HC RX 258: Performed by: INTERNAL MEDICINE

## 2022-08-02 PROCEDURE — 1100000000 HC RM PRIVATE

## 2022-08-02 PROCEDURE — 94660 CPAP INITIATION&MGMT: CPT

## 2022-08-02 RX ADMIN — MONTELUKAST 10 MG: 10 TABLET, FILM COATED ORAL at 22:05

## 2022-08-02 RX ADMIN — MOMETASONE FUROATE AND FORMOTEROL FUMARATE DIHYDRATE 2 PUFF: 200; 5 AEROSOL RESPIRATORY (INHALATION) at 19:40

## 2022-08-02 RX ADMIN — FUROSEMIDE 20 MG: 20 TABLET ORAL at 08:43

## 2022-08-02 RX ADMIN — MOMETASONE FUROATE AND FORMOTEROL FUMARATE DIHYDRATE 2 PUFF: 200; 5 AEROSOL RESPIRATORY (INHALATION) at 08:21

## 2022-08-02 RX ADMIN — VANCOMYCIN HYDROCHLORIDE 2000 MG: 100 INJECTION, POWDER, LYOPHILIZED, FOR SOLUTION INTRAVENOUS at 10:11

## 2022-08-02 RX ADMIN — Medication 1250 MG: at 22:08

## 2022-08-02 RX ADMIN — MORPHINE SULFATE 15 MG: 15 TABLET, FILM COATED, EXTENDED RELEASE ORAL at 08:43

## 2022-08-02 RX ADMIN — MORPHINE SULFATE 15 MG: 15 TABLET, FILM COATED, EXTENDED RELEASE ORAL at 22:05

## 2022-08-02 RX ADMIN — SODIUM CHLORIDE, PRESERVATIVE FREE 10 ML: 5 INJECTION INTRAVENOUS at 22:07

## 2022-08-02 RX ADMIN — OXYCODONE AND ACETAMINOPHEN 2 TABLET: 5; 325 TABLET ORAL at 09:00

## 2022-08-02 RX ADMIN — CARVEDILOL 25 MG: 25 TABLET, FILM COATED ORAL at 17:16

## 2022-08-02 RX ADMIN — TAMSULOSIN HYDROCHLORIDE 0.4 MG: 0.4 CAPSULE ORAL at 08:43

## 2022-08-02 RX ADMIN — AMIODARONE HYDROCHLORIDE 200 MG: 200 TABLET ORAL at 08:43

## 2022-08-02 RX ADMIN — MORPHINE SULFATE 1 MG: 2 INJECTION, SOLUTION INTRAMUSCULAR; INTRAVENOUS at 11:35

## 2022-08-02 RX ADMIN — CLOPIDOGREL BISULFATE 75 MG: 75 TABLET ORAL at 08:43

## 2022-08-02 RX ADMIN — FLUTICASONE PROPIONATE 2 SPRAY: 50 SPRAY, METERED NASAL at 08:44

## 2022-08-02 RX ADMIN — VALSARTAN 320 MG: 320 TABLET, FILM COATED ORAL at 08:43

## 2022-08-02 RX ADMIN — CARVEDILOL 25 MG: 25 TABLET, FILM COATED ORAL at 08:43

## 2022-08-02 RX ADMIN — APIXABAN 5 MG: 5 TABLET, FILM COATED ORAL at 08:43

## 2022-08-02 RX ADMIN — CEFTRIAXONE 2000 MG: 2 INJECTION, POWDER, FOR SOLUTION INTRAMUSCULAR; INTRAVENOUS at 08:43

## 2022-08-02 RX ADMIN — SODIUM CHLORIDE, PRESERVATIVE FREE 10 ML: 5 INJECTION INTRAVENOUS at 08:45

## 2022-08-02 RX ADMIN — LATANOPROST 1 DROP: 50 SOLUTION OPHTHALMIC at 08:45

## 2022-08-02 RX ADMIN — HEPARIN SODIUM 22 UNITS/KG/HR: 10000 INJECTION, SOLUTION INTRAVENOUS at 07:42

## 2022-08-02 RX ADMIN — APIXABAN 5 MG: 5 TABLET, FILM COATED ORAL at 22:05

## 2022-08-02 RX ADMIN — CALCITONIN SALMON 1 SPRAY: 200 SPRAY, METERED NASAL at 08:46

## 2022-08-02 ASSESSMENT — PAIN DESCRIPTION - ORIENTATION
ORIENTATION: MID;LOWER
ORIENTATION: MID;LOWER

## 2022-08-02 ASSESSMENT — PAIN SCALES - GENERAL
PAINLEVEL_OUTOF10: 0
PAINLEVEL_OUTOF10: 7
PAINLEVEL_OUTOF10: 0
PAINLEVEL_OUTOF10: 9

## 2022-08-02 ASSESSMENT — PAIN DESCRIPTION - ONSET
ONSET: ON-GOING
ONSET: ON-GOING

## 2022-08-02 ASSESSMENT — PAIN DESCRIPTION - DESCRIPTORS
DESCRIPTORS: ACHING
DESCRIPTORS: ACHING

## 2022-08-02 ASSESSMENT — PAIN DESCRIPTION - LOCATION
LOCATION: BACK
LOCATION: BACK

## 2022-08-02 ASSESSMENT — PAIN - FUNCTIONAL ASSESSMENT: PAIN_FUNCTIONAL_ASSESSMENT: PREVENTS OR INTERFERES SOME ACTIVE ACTIVITIES AND ADLS

## 2022-08-02 ASSESSMENT — PAIN DESCRIPTION - PAIN TYPE
TYPE: ACUTE PAIN
TYPE: ACUTE PAIN

## 2022-08-02 ASSESSMENT — PAIN DESCRIPTION - FREQUENCY
FREQUENCY: INTERMITTENT
FREQUENCY: INTERMITTENT

## 2022-08-02 NOTE — PROGRESS NOTES
VANCO DAILY FOLLOW UP NOTE  [facility] Pharmacokinetic Monitoring Service - Vancomycin    Consulting Provider: Marielos Pedersen   Indication: L2-L4 osteodiscitis and psoas abscess  Target Concentration: Goal AUC/GIOVANNY 400-600 mg*hr/L  Day of Therapy: 1 (anticipate 6 weeks per ID)  Additional Antimicrobials: ceftriaxone    Pertinent Laboratory Values: Wt Readings from Last 1 Encounters:   08/01/22 259 lb 14 oz (117.9 kg)     Temp Readings from Last 1 Encounters:   08/02/22 97.8 °F (36.6 °C) (Oral)     Recent Labs     07/31/22  0730 08/01/22  0646 08/02/22  0541   BUN 6* 7* 8   CREATININE 0.70* 0.60* 0.60*   WBC 7.4 7.7 7.2     Estimated Creatinine Clearance: 136 mL/min (A) (based on SCr of 0.6 mg/dL (L)). No results found for: Candileila Granger    MRSA Nasal Swab: N/A. Non-respiratory infection. .      Assessment:  Date/Time Dose Concentration AUC    1250 mg q12h     Note: Serum concentrations collected for AUC dosing may appear elevated if collected in close proximity to the dose administered, this is not necessarily an indication of toxicity    Plan:  Dosing recommendations based on Bayesian software  Start vancomycin with 2000 mg x 1 dose and then 1250 mg q12h  Anticipated AUC of 460 and trough concentration of 14.7 at steady state  Renal labs as indicated   Vancomycin concentrations will be ordered as clinically appropriate   Pharmacy will continue to monitor patient and adjust therapy as indicated    Thank you for the consult,  Katherin Pang, PharmD, BCOP  Clinical Pharmacist  Contact via [employer]

## 2022-08-02 NOTE — CARE COORDINATION
CM met with pt at the bedside to discuss d/c planning. CM explained that out of the two SNFs he and his spouse chose for [facility] does not have availability but [facility] has accepted. Pt requested that CM call his spouse. CM attempted to call but received voicemail. Will attempt to meet with spouse in room if she makes a visit today. CM met with pt's spouse at the bedside. She is in agreement for pt to d/c to [facility] for STR. CM has accepted the bed. Anticipate d/c at the end of the week. CM will continue to follow and remain available if any needs arise.

## 2022-08-02 NOTE — PROGRESS NOTES
[facility] CARDIOLOGY PROGRESS NOTE           8/2/2022 3:53 PM    Admit Date: 7/27/2022      Subjective:   Patient denies any dyspnea. Denies any chest discomfort. On room air. ICD interrogated and shows normal function. ROS:  Cardiovascular:  As noted above    Objective:      Vitals:    08/02/22 0826 08/02/22 0843 08/02/22 1133 08/02/22 1521   BP:  (!) 145/67 133/71 (!) 144/70   Pulse:   62 61   Resp: 16  18 20   Temp:   98.1 °F (36.7 °C) 98.2 °F (36.8 °C)   TempSrc:   Oral Oral   SpO2:   91% 92%   Weight:       Height:           Physical Exam:  General-No Acute Distress  Neck- supple, no JVD  CV- regular rate and rhythm no MRG  Lung- clear bilaterally  Abd- soft, nontender, nondistended  Ext- no edema bilaterally. Skin- warm and dry      Data Review:   Recent Labs     08/02/22  0541 08/01/22  0646 07/31/22  0730   WBC 7.2 7.7 7.4   HGB 8.9* 9.1* 9.1*   HCT 27.1* 27.6* 27.6*   MCV 95.8 94.8 95.2    264 269       Recent Labs     08/02/22  0541   *   K 4.1   CL 97*   CO2 32   BUN 8   CREATININE 0.60*   GLUCOSE 161*   CALCIUM 8.5   PROT 6.1*   LABALBU 2.1*   BILITOT 0.7   ALKPHOS 137*   AST 50*   *   LABGLOM >60   GFRAA >60   GLOB 4.0*       No results for input(s): CKTOTAL, CKMB, CKMBINDEX, DDIMER, TROPONINI in the last 720 hours. Assessment/Plan:         ICD (implantable cardioverter-defibrillator) in place  Normal device function. Interrogated today. Atrial fibrillation ([address]  On anticoagulation. Ventricular tachycardia (HCC)  Continue amiodarone. DM (diabetes mellitus) type II, controlled, with peripheral vascular disorder ([address]      Coronary artery disease involving native coronary artery of native heart without angina pectoris  No angina. On Plavix. Chronic systolic heart failure  Currently well compensated. Continue carvedilol, Lasix and valsartan therapy. Cardiac condition stable. Will sign off.   Please call with any questions or clinical changes. Appreciate consultation.             Gabbi Cooley MD

## 2022-08-02 NOTE — PROGRESS NOTES
Comprehensive Nutrition Assessment    Type and Reason for Visit: Reassess  Malnutrition Screening Tool: Malnutrition Screen  Have you recently lost weight without trying?: 34 or more pounds (4 points)  Have you been eating poorly because of a decreased appetite?: Yes (1 point)  Malnutrition Screening Tool Score: 5    Nutrition Recommendations/Plan:   Meals and Snacks:  Diet: Continue current order  Nutrition Supplement Therapy:  Medical food supplement therapy:  Continue Glucerna Shake three times per day (this provides 220 kcal and 10 grams protein per bottle)  Nutrition Supplement Therapy:  Medical food supplement therapy:  Initiate Magic Cup twice per day (this provides 290 kcal and 9 grams protein per serving)     Malnutrition Assessment:  Malnutrition Status: At risk for malnutrition (Comment) (progressive poor PO, wt loss, advanced age)    Nutrition Assessment:  Nutrition History: History provided by patient and spouse. They both endorse declining intake since COVID infection last year. Wife states that they used to eat out for breakfast every day and it was their social activity and then would eat dinner. She states declining PO volume since the beginning of the year and subsequent weight loss. She states that he has been able to get him to eat a pack of oatmeal or an egg for breakfast and at least snacks. But she states that within the last 2 weeks since recent surgery she has had trouble convincing him to eat anything. Mild wasting to temples, none otherwise. Do You Have Any Cultural, Christianity, or Ethnic Food Preferences?: No   Nutrition Background:   Patient with PMH significant for ICM/PPM, Afib, DM2, obesity, PAD, anemia, COPD, CAD, SHERI on CPAP, recentl L2-4 kyphoplasty. He presented back with bilateral lower extremity weakness and falls. Nutrition Interval:  Noted MRI results with psoas abscess. IR consulted but too small to drain. Patient remains inpatient pending d/c to rehab.   Patient seen reclined in bed with several family members present. When RD inquiring if PO intake an better wife states \"a little bit. \" Patient does not really answer many questions. Wife states he is drinking Glucerna. When RD asking if patient would try Magic Cup he agrees. Discussed importance of adequate nutrition to maintain strength and avoid more weight loss, patient states \"if you say so. \" Wife states he ate a banana pudding today in addition to some breakfast. He did not want lunch so wife brought chicken salad sandwich which patient also did not eat. Mary Herrera, states that patient only ate this am because wife was making him. Current Nutrition Therapies:  ADULT DIET; Regular; 3 carb choices (45 gm/meal); Low Fat/Low Chol/High Fiber/ANGELA  ADULT ORAL NUTRITION SUPPLEMENT; Breakfast, Lunch, Dinner; Diabetic Oral Supplement    Current Intake:   Average Meal Intake:  (variable %) Average Supplements Intake: 51-75%      Anthropometric Measures:  Height: 6' 1\" (185.4 cm)  Current Body Wt: 259 lb 14.8 oz (117.9 kg) (8/1), Weight source: Bed Scale  BMI: 34.3, Obese Class 1 (BMI 30.0-34. 9)  Admission Body Weight: 260 lb (117.9 kg) (7/27 stated)  Ideal Body Weight (Kg) (Calculated): 84 kg (184 lbs), 141.3 %  Usual Body Wt: 280 lb (127 kg) (1/6/22 office wt), Percent weight change: -7.2       Edema:Peripheral Vascular  Peripheral Vascular (WDL): Exceptions to WDL  Edema: Generalized  Edema Generalized: +1;Non-pitting   BMI Category Obese Class 1 (BMI 30.0-34. 9)  Estimated Daily Nutrient Needs:  Energy (kcal/day): 1621-3881 (Kcal/kg (15-20) Weight used: 117.9 kg Current  Protein (g/day):  (20% kcal) Weight Used: (Other (Comment))    Fluid (ml/day):   (1 ml/kcal)    Nutrition Diagnosis:   Inadequate oral intake related to  (no appetite) as evidenced by  (reported barrier to PO, intake as above)  Nutrition Interventions:   Food and/or Nutrient Delivery: Continue Current Diet, Start Oral Nutrition Supplement Coordination of Nutrition Care: Continue to monitor while inpatient       Goals:   Previous Goal Met: Progressing toward Goal(s)  Active Goal: PO intake 50% or greater       Nutrition Monitoring and Evaluation:      Food/Nutrient Intake Outcomes: Food and Nutrient Intake, Supplement Intake  Physical Signs/Symptoms Outcomes: Meal Time Behavior, Weight    Discharge Planning:    Continue Oral Nutrition Supplement    MAGUE BLAIR, RD

## 2022-08-02 NOTE — PLAN OF CARE
Left message that it is very important to schedule follow-up  hearing screening early.  The closest office is on [address].  Please call [phone] to schedule an appointment.   Problem: Pain  Goal: Verbalizes/displays adequate comfort level or baseline comfort level  Outcome: Progressing  Flowsheets (Taken 8/2/2022 1838 by Joe Juarez RN)  Verbalizes/displays adequate comfort level or baseline comfort level: Encourage patient to monitor pain and request assistance     Problem: Safety - Adult  Goal: Free from fall injury  Outcome: Progressing     Problem: Chronic Conditions and Co-morbidities  Goal: Patient's chronic conditions and co-morbidity symptoms are monitored and maintained or improved  Outcome: Progressing     Problem: Skin/Tissue Integrity  Goal: Absence of new skin breakdown  Description: 1. Monitor for areas of redness and/or skin breakdown  2. Assess vascular access sites hourly  3. Every 4-6 hours minimum:  Change oxygen saturation probe site  4. Every 4-6 hours:  If on nasal continuous positive airway pressure, respiratory therapy assess nares and determine need for appliance change or resting period.   Outcome: Progressing     Problem: ABCDS Injury Assessment  Goal: Absence of physical injury  Outcome: Progressing     Problem: Respiratory - Adult  Goal: Achieves optimal ventilation and oxygenation  Outcome: Progressing

## 2022-08-02 NOTE — PROGRESS NOTES
08/01/22 2330   NIV Type   Skin Assessment Clean, dry, & intact   NIV Started/Stopped On   Equipment Type Home CPAP   Mode Auto-PAP   Mask Type Nasal mask   Mask Size Large   Settings/Measurements   Resp 18   FiO2  21 %   Comfort Level Good   Using Accessory Muscles No   SpO2 96   Patient's Home Machine Yes (type/vendor)

## 2022-08-02 NOTE — PROGRESS NOTES
Hospitalist Progress Note   Admit Date:  2022  3:41 PM   Name:  Abdullahi Holliday   Age:  65 y.o. Sex:  male  :  1945   MRN:  [id number]   Room:  [facility]/      Reason(s) for Admission: Back pain [M54.9]  General weakness [R53.1]  Acute cystitis without hematuria [N30.00]  Low back pain without sciatica, unspecified back pain laterality, unspecified chronicity [M54.50]     Hospital Course & Interval History:   Abdullahi Holliday is a 65 y.o. male with medical history of ICM with ICD/PPM, EF 30-35% afib on eliquis, DM2, obesity, PAD, anemia, COPD/ asthma, CAD, SHERI on CPAP with recent L2-4 kyphoplasty evaluated with diffuse  bilateral LE weakness. He states this has been worse since his kyphoplasty and he has had several falls. Lives with wife Amina Moss. Seen at ortho spine today and ordered MRI Lspine to evaluate ongoing pain and weakness. Did have negative bone biopsy with kyphoplasty. Felt worse and not better post procedure. Unable to ambulate well, using walker, has trouble getting up from seated position. No LE paresthesia and no urine issues excluding some issues starting stream. Had some loose BM and can't make it to the bathroom in time. No fever. No dysuria. Urine is darker. Has ICD/PPM- will need clearance for MRI. CT Lspine shows \"  Postoperative changes from kyphoplasty procedure at L2 and L4. No lumbar spine   fracture or malalignment aside from the compression deformities. Multiple levels   of probable spinal canal stenosis as described on recent MRI. \"     UA positive. urine culture showing  more than 50k gram-negative rods and awaiting sensitivities. He was treated with Rocephin. CXR with improved infiltrates. COVID negative. MRI brain no acute infarct   MRI lumbar and cervical spine showed epidural, psoas abscess and osteomyelitis. CRP is elevated 12.4. His Eliquis and Plavix were stopped. He was on heparin drip.   Antibiotics were discontinued and IR was consulted for draining the abscess. Blood cultures were negative. ID was consulted and may need to 6 weeks of antibiotics treatment. Tomas was placed as he had urinary retention of 500 mL. Pain management with Percocet and morphine. PT,OT recommended STR. Wife Ludmila Henriquez [phone], or [phone]     He is a DNI       Subjective/24hr Events (08/02/22): Patient is seen and examined at the bedside. He is complaining of severe back pain. His pain is well controlled with morphine. He is hard of hearing. Patient denies chest pain, shortness of breath, nausea, vomiting, diarrhea. Assessment & Plan:     Back pain  Ortho following  Neurology signed off  Pain management with Percocet and morphine  PT,OT recommended STR     Newly diagnosed Epidural and psoas abscess  S/p IR guided drainage of the abscess in 3 ml is removed   blood cultures  negative  Follow-up with culture results  Continue antibiotics Vanco and ceftriaxone   May need long-term therapy with antibiotics for at least 6 weeks  Eliquis and Plavix resumed   ID is following, appreciate recommendations     Acute Osteomyelitis  Anticipate 6 weeks of antibiotic therapy      Acute cystitis without hematuria   urine culture showing  more than 50k gram-negative rods and pansensitive    DNI (do not intubate)  Noted and discussed       Elevated liver enzymes  Trend lab      Anemia  HGB 10.2, stable   Stable, chronic range for him  Trend HGB     Hypertension  Most likely due to pain  Diet controlled    Urinary retention  S/p Tomas  Continue Flomax      Hyponatremia  Resolved     Ischemic cardiomyopathy  Plan:    Ventricular tachycardia ([address]  Plan:    Coronary artery disease involving native coronary artery of native heart without angina pectoris  Plan:    PAD (peripheral artery disease) ([address]  Plan:   Active Problems:    ICD (implantable cardioverter-defibrillator) in place  Plan:     Atrial fibrillation ([address]  Plan:   Eliquis and Plavix is discontinued for the anticipation of procedures  Patient is on heparin drip  Continue  amiodarone, statin      Severe obesity (BMI 35.0-39. 9) with comorbidity ([address]  Plan:   Needs modification       COPD (chronic obstructive pulmonary disease) (HCC)  Plan:   Resume inhalers       DM (diabetes mellitus) type II, controlled, with peripheral vascular disorder ([address]  Plan:   SSI       Obstructive sleep apnea  Plan:   Resume CPAP       Dispo/Discharge Planning: Anticipating hospital stay for 3 to 5 days       Diet:  ADULT DIET; Regular; 3 carb choices (45 gm/meal); Low Fat/Low Chol/High Fiber/ANGELA  ADULT ORAL NUTRITION SUPPLEMENT; Breakfast, Lunch, Dinner; Diabetic Oral Supplement  DVT Ppx Eliquis  Code status: Limited    Hospital Problems             Last Modified POA    * (Principal) Back pain 7/27/2022 Yes    ICD (implantable cardioverter-defibrillator) in place 7/27/2022 Yes    General weakness 7/27/2022 Yes    Acute cystitis without hematuria 7/27/2022 Yes    Low back pain without sciatica 7/27/2022 Yes    DNI (do not intubate) (Chronic) 7/27/2022 Yes    UTI (urinary tract infection) 7/27/2022 Yes    Elevated liver enzymes 7/27/2022 Yes    Anemia (Chronic) 7/27/2022 Yes    Hyponatremia 7/27/2022 Yes    Atrial fibrillation ([address] 7/27/2022 Yes    Severe obesity (BMI 35.0-39. 9) with comorbidity ([address] 7/27/2022 Yes    COPD (chronic obstructive pulmonary disease) ([address] 7/27/2022 Yes    Intermittent spinal claudication ([address] 7/27/2022 Yes    Ischemic cardiomyopathy 7/27/2022 Yes    Ventricular tachycardia ([address] 7/27/2022 Yes    Coronary artery disease involving native coronary artery of native heart without angina pectoris 7/27/2022 Yes    PAD (peripheral artery disease) ([address] 7/27/2022 Yes    Asthma 7/27/2022 Yes    DM (diabetes mellitus) type II, controlled, with peripheral vascular disorder ([address] 7/27/2022 Yes    Obstructive sleep apnea 7/27/2022 Yes       Objective:   Patient Vitals for the past 24 hrs:   Temp Pulse Resp BP SpO2   08/02/22 1133 98.1 °F (36.7 °C) 62 18 133/71 91 %   08/02/22 0843 -- -- -- (!) 145/67 --   08/02/22 0826 -- -- 16 -- --   08/02/22 0821 -- 96 16 -- 91 %   08/02/22 0720 97.8 °F (36.6 °C) 62 18 (!) 145/67 96 %   08/02/22 0449 -- -- 20 -- 95 %   08/02/22 0303 97.7 °F (36.5 °C) 61 30 138/80 (!) 5 %   08/01/22 2330 -- -- 18 -- --   08/01/22 2239 97.6 °F (36.4 °C) 62 20 131/72 94 %   08/01/22 1937 97.7 °F (36.5 °C) 60 20 124/66 93 %   08/01/22 1936 -- 62 18 -- 93 %   08/01/22 1631 98.2 °F (36.8 °C) 64 20 (!) 170/74 91 %   08/01/22 1555 -- 61 16 (!) 147/71 91 %   08/01/22 1545 -- 62 16 134/72 97 %   08/01/22 1541 -- 64 16 (!) 143/65 91 %   08/01/22 1530 -- 61 16 135/73 98 %   08/01/22 1523 -- 60 18 (!) 144/74 98 %   08/01/22 1518 -- 62 18 (!) 150/73 98 %   08/01/22 1514 -- 62 18 (!) 144/82 99 %   08/01/22 1509 -- 66 18 (!) 157/74 96 %       Estimated body mass index is 34.29 kg/m² as calculated from the following:    Height as of this encounter: 6' 1\" (1.854 m). Weight as of this encounter: 259 lb 14 oz (117.9 kg). Intake/Output Summary (Last 24 hours) at 8/2/2022 1409  Last data filed at 8/2/2022 0636  Gross per 24 hour   Intake 620 ml   Output 850 ml   Net -230 ml         Physical Exam:     Blood pressure 133/71, pulse 62, temperature 98.1 °F (36.7 °C), temperature source Oral, resp. rate 18, height 6' 1\" (1.854 m), weight 259 lb 14 oz (117.9 kg), SpO2 91 %. General:    Well nourished. No overt distress, morbidly obese  Head:  Normocephalic, atraumatic  Eyes:  Sclerae appear normal. Pupils equally round. ENT:  Nares appear normal, no drainage. Moist oral mucosa  Neck:  No restricted ROM. Trachea midline. CV:   RRR. No m/r/g. No jugular venous distension. Lungs:   CTAB. No wheezing, rhonchi, or rales. Respirations even, unlabored. Abdomen: Bowel sounds present. Soft, nontender, nondistended. Extremities: No cyanosis or clubbing. No edema. Tenderness of the back is present.   Skin:     No rashes and normal coloration. Warm and dry. Neuro:  CN II-XII grossly intact. Sensation intact. A&Ox3  Psych:  Normal mood and affect. I have reviewed ordered lab tests and independently visualized imaging below:    Recent Labs:  Recent Results (from the past 48 hour(s))   APTT    Collection Time: 07/31/22  3:52 PM   Result Value Ref Range    PTT 53.1 (H) 24.1 - 35.1 SEC   POCT Glucose    Collection Time: 07/31/22  4:13 PM   Result Value Ref Range    POC Glucose 190 (H) 65 - 100 mg/dL    Performed by: [place]    POCT Glucose    Collection Time: 07/31/22  8:55 PM   Result Value Ref Range    POC Glucose 253 (H) 65 - 100 mg/dL    Performed by:  See)Liz    APTT    Collection Time: 07/31/22 11:01 PM   Result Value Ref Range    PTT 68.9 (H) 24.1 - 35.1 SEC   CBC with Auto Differential    Collection Time: 08/01/22  6:46 AM   Result Value Ref Range    WBC 7.7 4.3 - 11.1 K/uL    RBC 2.91 (L) 4.23 - 5.6 M/uL    Hemoglobin 9.1 (L) 13.6 - 17.2 g/dL    Hematocrit 27.6 (L) 41.1 - 50.3 %    MCV 94.8 79.6 - 97.8 FL    MCH 31.3 26.1 - 32.9 PG    MCHC 33.0 31.4 - 35.0 g/dL    RDW 14.4 11.9 - 14.6 %    Platelets 004 904 - 841 K/uL    MPV 10.3 9.4 - 12.3 FL    nRBC 0.00 0.0 - 0.2 K/uL    Differential Type AUTOMATED      Seg Neutrophils 78 43 - 78 %    Lymphocytes 12 (L) 13 - 44 %    Monocytes 8 4.0 - 12.0 %    Eosinophils % 0 (L) 0.5 - 7.8 %    Basophils 0 0.0 - 2.0 %    Immature Granulocytes 2 0.0 - 5.0 %    Segs Absolute 6.0 1.7 - 8.2 K/UL    Absolute Lymph # 0.9 0.5 - 4.6 K/UL    Absolute Mono # 0.6 0.1 - 1.3 K/UL    Absolute Eos # 0.0 0.0 - 0.8 K/UL    Basophils Absolute 0.0 0.0 - 0.2 K/UL    Absolute Immature Granulocyte 0.2 0.0 - 0.5 K/UL   Comprehensive Metabolic Panel w/ Reflex to MG    Collection Time: 08/01/22  6:46 AM   Result Value Ref Range    Sodium 130 (L) 136 - 145 mmol/L    Potassium 3.6 3.5 - 5.1 mmol/L    Chloride 96 (L) 98 - 107 mmol/L    CO2 33 (H) 21 - 32 mmol/L    Anion Gap 1 (L) 7 - 16 mmol/L    Glucose 171 (H) 65 - 100 mg/dL    BUN 7 (L) 8 - 23 MG/DL    Creatinine 0.60 (L) 0.8 - 1.5 MG/DL    GFR African American >60 >60 ml/min/1.73m2    GFR Non- >60 >60 ml/min/1.73m2    Calcium 8.7 8.3 - 10.4 MG/DL    Total Bilirubin 0.6 0.2 - 1.1 MG/DL     (H) 12 - 65 U/L    AST 70 (H) 15 - 37 U/L    Alk Phosphatase 140 (H) 50 - 136 U/L    Total Protein 6.1 (L) 6.3 - 8.2 g/dL    Albumin 2.1 (L) 3.2 - 4.6 g/dL    Globulin 4.0 (H) 2.3 - 3.5 g/dL    Albumin/Globulin Ratio 0.5 (L) 1.2 - 3.5     Phosphorus    Collection Time: 08/01/22  6:46 AM   Result Value Ref Range    Phosphorus 3.0 2.3 - 3.7 MG/DL   APTT    Collection Time: 08/01/22  6:46 AM   Result Value Ref Range    PTT 72.5 (H) 24.1 - 35.1 SEC   POCT Glucose    Collection Time: 08/01/22  8:02 AM   Result Value Ref Range    POC Glucose 172 (H) 65 - 100 mg/dL    Performed by: Peggy An. C-Reactive Protein    Collection Time: 08/01/22  9:51 AM   Result Value Ref Range    CRP 8.9 (H) 0.0 - 0.9 mg/dL   Sedimentation Rate    Collection Time: 08/01/22  9:51 AM   Result Value Ref Range    Sed Rate, Automated 42 (H) 15 mm/hr   POCT Glucose    Collection Time: 08/01/22 12:20 PM   Result Value Ref Range    POC Glucose 196 (H) 65 - 100 mg/dL    Performed by: Peggy An. Culture, Wound    Collection Time: 08/01/22  3:25 PM    Specimen: Abscess   Result Value Ref Range    Special Requests DISC     Gram stain 20 TO 50 WBCS SEEN /OIF     Gram stain NO DEFINITE ORGANISM SEEN      Culture        No growth after short period of incubation. Further results to follow after overnight incubation. POCT Glucose    Collection Time: 08/01/22  4:39 PM   Result Value Ref Range    POC Glucose 178 (H) 65 - 100 mg/dL    Performed by: Peggy An.     POCT Glucose    Collection Time: 08/01/22  8:36 PM   Result Value Ref Range    POC Glucose 209 (H) 65 - 100 mg/dL    Performed by: Freddy Flood    APTT    Collection Time: 08/01/22  9:21 PM Result Value Ref Range    PTT 33.8 24.1 - 35.1 SEC   CBC with Auto Differential    Collection Time: 08/02/22  5:41 AM   Result Value Ref Range    WBC 7.2 4.3 - 11.1 K/uL    RBC 2.83 (L) 4.23 - 5.6 M/uL    Hemoglobin 8.9 (L) 13.6 - 17.2 g/dL    Hematocrit 27.1 (L) 41.1 - 50.3 %    MCV 95.8 79.6 - 97.8 FL    MCH 31.4 26.1 - 32.9 PG    MCHC 32.8 31.4 - 35.0 g/dL    RDW 14.8 (H) 11.9 - 14.6 %    Platelets 432 799 - 932 K/uL    MPV 10.0 9.4 - 12.3 FL    nRBC 0.00 0.0 - 0.2 K/uL    Differential Type AUTOMATED      Seg Neutrophils 74 43 - 78 %    Lymphocytes 14 13 - 44 %    Monocytes 9 4.0 - 12.0 %    Eosinophils % 0 (L) 0.5 - 7.8 %    Basophils 1 0.0 - 2.0 %    Immature Granulocytes 2 0.0 - 5.0 %    Segs Absolute 5.4 1.7 - 8.2 K/UL    Absolute Lymph # 1.0 0.5 - 4.6 K/UL    Absolute Mono # 0.7 0.1 - 1.3 K/UL    Absolute Eos # 0.0 0.0 - 0.8 K/UL    Basophils Absolute 0.1 0.0 - 0.2 K/UL    Absolute Immature Granulocyte 0.1 0.0 - 0.5 K/UL   Comprehensive Metabolic Panel w/ Reflex to MG    Collection Time: 08/02/22  5:41 AM   Result Value Ref Range    Sodium 134 (L) 136 - 145 mmol/L    Potassium 4.1 3.5 - 5.1 mmol/L    Chloride 97 (L) 98 - 107 mmol/L    CO2 32 21 - 32 mmol/L    Anion Gap 5 (L) 7 - 16 mmol/L    Glucose 161 (H) 65 - 100 mg/dL    BUN 8 8 - 23 MG/DL    Creatinine 0.60 (L) 0.8 - 1.5 MG/DL    GFR African American >60 >60 ml/min/1.73m2    GFR Non- >60 >60 ml/min/1.73m2    Calcium 8.5 8.3 - 10.4 MG/DL    Total Bilirubin 0.7 0.2 - 1.1 MG/DL     (H) 12 - 65 U/L    AST 50 (H) 15 - 37 U/L    Alk Phosphatase 137 (H) 50 - 136 U/L    Total Protein 6.1 (L) 6.3 - 8.2 g/dL    Albumin 2.1 (L) 3.2 - 4.6 g/dL    Globulin 4.0 (H) 2.3 - 3.5 g/dL    Albumin/Globulin Ratio 0.5 (L) 1.2 - 3.5     Phosphorus    Collection Time: 08/02/22  5:41 AM   Result Value Ref Range    Phosphorus 3.4 2.3 - 3.7 MG/DL   Anti-Xa, Unfractionated Heparin    Collection Time: 08/02/22  5:41 AM   Result Value Ref Range    Anti-XA Unfrac Heparin 0.50 0.3 - 0.7 IU/mL   APTT    Collection Time: 08/02/22  5:41 AM   Result Value Ref Range    PTT 63.6 (H) 24.1 - 35.1 SEC   POCT Glucose    Collection Time: 08/02/22  7:20 AM   Result Value Ref Range    POC Glucose 159 (H) 65 - 100 mg/dL    Performed by: [place]    POCT Glucose    Collection Time: 08/02/22 11:42 AM   Result Value Ref Range    POC Glucose 197 (H) 65 - 100 mg/dL    Performed by: Forrest          Other Studies:  XR LUMBAR SPINE (2-3 VIEWS)    Result Date: 7/27/2022  AUTOMATIC ADMINISTRATIVE RESULT The result for this exam can be found in the Progress note in the chart. See Progress note in the chart. CT LUMBAR SPINE WO CONTRAST    Result Date: 7/27/2022  EXAMINATION: CT LUMBAR SPINE WO CONTRAST 7/27/2022 6:21 PM ACCESSION NUMBER: [id number] COMPARISON: MRI lumbar spine 06/02/2022. INDICATION: Recent kyphoplasty with fall. TECHNIQUE: Multiple-row detector helical CT examination of the lumbar spine was performed without intravenous contrast. Multiplanar reformats were provided. Radiation dose reduction techniques were used for this study. Our CT scanners use one or all of the following: Automated exposure control, adjustment of the mA and/or kV according to patient size, iterative reconstruction. FINDINGS: Patient is status post interval kyphoplasty procedure at L2 and L4 when compared to prior MRI. Mild compression fractures are noted at these levels. No retropulsed fragments noted in the spinal canal. Multilevel degenerative disc changes are present. No malalignment. Areas of spinal canal stenosis suspected as described on recent prior MRI. Postoperative changes from kyphoplasty procedure at L2 and L4. No lumbar spine fracture or malalignment aside from the compression deformities. Multiple levels of probable spinal canal stenosis as described on recent MRI.      XR CHEST PORTABLE    Result Date: 7/27/2022  XR CHEST PORTABLE 7/27/2022 6:35 PM HISTORY: Weakness. COMPARISON: Chest x-ray 3/7/2022. A portable AP view of the chest was obtained. Pulmonary infiltrate predominantly in the right upper lobe and mid to lower left lung has mostly cleared. Atelectasis or scarring right lung base appears stable. Stable cardiomegaly. Implantable cardiac device left chest and both leads are unchanged. No pneumothorax. No pleural effusions.        Current Meds:  Current Facility-Administered Medications   Medication Dose Route Frequency    cefTRIAXone (ROCEPHIN) 2,000 mg in sodium chloride 0.9 % 50 mL IVPB mini-bag  2,000 mg IntraVENous Q24H    vancomycin (VANCOCIN) 1250 mg in sodium chloride 0.9% 250 mL IVPB  1,250 mg IntraVENous Q12H    heparin (porcine) injection 2,000 Units  2,000 Units IntraVENous Once    oxyCODONE-acetaminophen (PERCOCET) 5-325 MG per tablet 2 tablet  2 tablet Oral Q4H PRN    morphine (MS CONTIN) extended release tablet 15 mg  15 mg Oral 2 times per day    amiodarone (CORDARONE) tablet 200 mg  200 mg Oral Daily    morphine (PF) injection 1 mg  1 mg IntraVENous Q4H PRN    tamsulosin (FLOMAX) capsule 0.4 mg  0.4 mg Oral Daily    albuterol sulfate HFA (PROVENTIL;VENTOLIN;PROAIR) 108 (90 Base) MCG/ACT inhaler 2 puff  2 puff Inhalation Q4H PRN    apixaban (ELIQUIS) tablet 5 mg  5 mg Oral 2 times per day    calcitonin (MIACALCIN) nasal spray 1 spray  1 spray Alternating Nares Daily    carvedilol (COREG) tablet 25 mg  25 mg Oral BID WC    clopidogrel (PLAVIX) tablet 75 mg  75 mg Oral Daily    fluticasone (FLONASE) 50 MCG/ACT nasal spray 2 spray  2 spray Each Nostril Daily    mometasone-formoterol (DULERA) 200-5 MCG/ACT inhaler 2 puff  2 puff Inhalation BID    furosemide (LASIX) tablet 20 mg  20 mg Oral Daily    latanoprost (XALATAN) 0.005 % ophthalmic solution 1 drop  1 drop Ophthalmic Daily    montelukast (SINGULAIR) tablet 10 mg  10 mg Oral Nightly    [Held by provider] rosuvastatin (CRESTOR) tablet 10 mg  10 mg Oral Daily    valsartan (DIOVAN) tablet 320 mg  320 mg Oral Daily    sodium chloride flush 0.9 % injection 5-40 mL  5-40 mL IntraVENous 2 times per day    sodium chloride flush 0.9 % injection 5-40 mL  5-40 mL IntraVENous PRN    0.9 % sodium chloride infusion   IntraVENous PRN    ondansetron (ZOFRAN-ODT) disintegrating tablet 4 mg  4 mg Oral Q8H PRN    Or    ondansetron (ZOFRAN) injection 4 mg  4 mg IntraVENous Q6H PRN    polyethylene glycol (GLYCOLAX) packet 17 g  17 g Oral Daily PRN    acetaminophen (TYLENOL) tablet 650 mg  650 mg Oral Q6H PRN    Or    acetaminophen (TYLENOL) suppository 650 mg  650 mg Rectal Q6H PRN    glucose chewable tablet 16 g  4 tablet Oral PRN    dextrose bolus 10% 125 mL  125 mL IntraVENous PRN    Or    dextrose bolus 10% 250 mL  250 mL IntraVENous PRN    glucagon (rDNA) injection 1 mg  1 mg SubCUTAneous PRN    dextrose 10 % infusion   IntraVENous Continuous PRN    insulin lispro (HUMALOG) injection vial 0-4 Units  0-4 Units SubCUTAneous TID WC    insulin lispro (HUMALOG) injection vial 0-4 Units  0-4 Units SubCUTAneous Nightly       Signed:  Oscar Bernstein MD    Part of this note may have been written by using a voice dictation software. The note has been proof read but may still contain some grammatical/other typographical errors.

## 2022-08-02 NOTE — PROGRESS NOTES
OT Note:    OT attempted to see patient today for therapy. Pt did not want to participate at this time. OT will re-attempt to see patient at a later date/time.     Thanks,  COLETTE Reid

## 2022-08-03 LAB
ALBUMIN SERPL-MCNC: 2.2 G/DL (ref 3.2–4.6)
ALBUMIN/GLOB SERPL: 0.5 {RATIO} (ref 1.2–3.5)
ALP SERPL-CCNC: 139 U/L (ref 50–136)
ALT SERPL-CCNC: 154 U/L (ref 12–65)
ANION GAP SERPL CALC-SCNC: 4 MMOL/L (ref 7–16)
AST SERPL-CCNC: 30 U/L (ref 15–37)
BASOPHILS # BLD: 0 K/UL (ref 0–0.2)
BASOPHILS NFR BLD: 1 % (ref 0–2)
BILIRUB SERPL-MCNC: 0.7 MG/DL (ref 0.2–1.1)
BUN SERPL-MCNC: 7 MG/DL (ref 8–23)
CALCIUM SERPL-MCNC: 8.9 MG/DL (ref 8.3–10.4)
CHLORIDE SERPL-SCNC: 95 MMOL/L (ref 98–107)
CO2 SERPL-SCNC: 31 MMOL/L (ref 21–32)
CREAT SERPL-MCNC: 0.7 MG/DL (ref 0.8–1.5)
DIFFERENTIAL METHOD BLD: ABNORMAL
EOSINOPHIL # BLD: 0 K/UL (ref 0–0.8)
EOSINOPHIL NFR BLD: 0 % (ref 0.5–7.8)
ERYTHROCYTE [DISTWIDTH] IN BLOOD BY AUTOMATED COUNT: 15 % (ref 11.9–14.6)
GLOBULIN SER CALC-MCNC: 4.2 G/DL (ref 2.3–3.5)
GLUCOSE BLD STRIP.AUTO-MCNC: 158 MG/DL (ref 65–100)
GLUCOSE BLD STRIP.AUTO-MCNC: 180 MG/DL (ref 65–100)
GLUCOSE BLD STRIP.AUTO-MCNC: 183 MG/DL (ref 65–100)
GLUCOSE BLD STRIP.AUTO-MCNC: 244 MG/DL (ref 65–100)
GLUCOSE SERPL-MCNC: 151 MG/DL (ref 65–100)
HCT VFR BLD AUTO: 27.4 % (ref 41.1–50.3)
HGB BLD-MCNC: 9 G/DL (ref 13.6–17.2)
IMM GRANULOCYTES # BLD AUTO: 0.1 K/UL (ref 0–0.5)
IMM GRANULOCYTES NFR BLD AUTO: 2 % (ref 0–5)
LYMPHOCYTES # BLD: 0.9 K/UL (ref 0.5–4.6)
LYMPHOCYTES NFR BLD: 12 % (ref 13–44)
MCH RBC QN AUTO: 31.9 PG (ref 26.1–32.9)
MCHC RBC AUTO-ENTMCNC: 32.8 G/DL (ref 31.4–35)
MCV RBC AUTO: 97.2 FL (ref 79.6–97.8)
MONOCYTES # BLD: 0.7 K/UL (ref 0.1–1.3)
MONOCYTES NFR BLD: 9 % (ref 4–12)
NEUTS SEG # BLD: 6.2 K/UL (ref 1.7–8.2)
NEUTS SEG NFR BLD: 76 % (ref 43–78)
NRBC # BLD: 0 K/UL (ref 0–0.2)
PHOSPHATE SERPL-MCNC: 3.3 MG/DL (ref 2.3–3.7)
PLATELET # BLD AUTO: 259 K/UL (ref 150–450)
PMV BLD AUTO: 9.9 FL (ref 9.4–12.3)
POTASSIUM SERPL-SCNC: 4.3 MMOL/L (ref 3.5–5.1)
PROT SERPL-MCNC: 6.4 G/DL (ref 6.3–8.2)
RBC # BLD AUTO: 2.82 M/UL (ref 4.23–5.6)
SERVICE CMNT-IMP: ABNORMAL
SODIUM SERPL-SCNC: 130 MMOL/L (ref 138–145)
VANCOMYCIN SERPL-MCNC: 15.2 UG/ML
WBC # BLD AUTO: 8 K/UL (ref 4.3–11.1)

## 2022-08-03 PROCEDURE — 6360000002 HC RX W HCPCS: Performed by: STUDENT IN AN ORGANIZED HEALTH CARE EDUCATION/TRAINING PROGRAM

## 2022-08-03 PROCEDURE — 2580000003 HC RX 258: Performed by: INTERNAL MEDICINE

## 2022-08-03 PROCEDURE — 94640 AIRWAY INHALATION TREATMENT: CPT

## 2022-08-03 PROCEDURE — 2580000003 HC RX 258: Performed by: STUDENT IN AN ORGANIZED HEALTH CARE EDUCATION/TRAINING PROGRAM

## 2022-08-03 PROCEDURE — 97112 NEUROMUSCULAR REEDUCATION: CPT

## 2022-08-03 PROCEDURE — 1100000000 HC RM PRIVATE

## 2022-08-03 PROCEDURE — 2500000003 HC RX 250 WO HCPCS: Performed by: NURSE PRACTITIONER

## 2022-08-03 PROCEDURE — 6370000000 HC RX 637 (ALT 250 FOR IP): Performed by: NURSE PRACTITIONER

## 2022-08-03 PROCEDURE — 36415 COLL VENOUS BLD VENIPUNCTURE: CPT

## 2022-08-03 PROCEDURE — C1751 CATH, INF, PER/CENT/MIDLINE: HCPCS

## 2022-08-03 PROCEDURE — 97530 THERAPEUTIC ACTIVITIES: CPT

## 2022-08-03 PROCEDURE — 6360000002 HC RX W HCPCS: Performed by: INTERNAL MEDICINE

## 2022-08-03 PROCEDURE — 82962 GLUCOSE BLOOD TEST: CPT

## 2022-08-03 PROCEDURE — 94760 N-INVAS EAR/PLS OXIMETRY 1: CPT

## 2022-08-03 PROCEDURE — 80202 ASSAY OF VANCOMYCIN: CPT

## 2022-08-03 PROCEDURE — 6370000000 HC RX 637 (ALT 250 FOR IP): Performed by: INTERNAL MEDICINE

## 2022-08-03 PROCEDURE — 36573 INSJ PICC RS&I 5 YR+: CPT

## 2022-08-03 PROCEDURE — 85025 COMPLETE CBC W/AUTO DIFF WBC: CPT

## 2022-08-03 PROCEDURE — 97535 SELF CARE MNGMENT TRAINING: CPT

## 2022-08-03 PROCEDURE — 80053 COMPREHEN METABOLIC PANEL: CPT

## 2022-08-03 PROCEDURE — 84100 ASSAY OF PHOSPHORUS: CPT

## 2022-08-03 PROCEDURE — 6370000000 HC RX 637 (ALT 250 FOR IP): Performed by: STUDENT IN AN ORGANIZED HEALTH CARE EDUCATION/TRAINING PROGRAM

## 2022-08-03 RX ORDER — OXYCODONE HYDROCHLORIDE AND ACETAMINOPHEN 5; 325 MG/1; MG/1
1 TABLET ORAL EVERY 4 HOURS PRN
Status: DISCONTINUED | OUTPATIENT
Start: 2022-08-03 | End: 2022-08-05

## 2022-08-03 RX ORDER — LIDOCAINE HYDROCHLORIDE 10 MG/ML
5 INJECTION, SOLUTION INFILTRATION; PERINEURAL ONCE
Status: COMPLETED | OUTPATIENT
Start: 2022-08-03 | End: 2022-08-03

## 2022-08-03 RX ORDER — SODIUM CHLORIDE 9 MG/ML
25 INJECTION, SOLUTION INTRAVENOUS PRN
Status: DISCONTINUED | OUTPATIENT
Start: 2022-08-03 | End: 2022-08-08 | Stop reason: HOSPADM

## 2022-08-03 RX ORDER — SODIUM CHLORIDE 0.9 % (FLUSH) 0.9 %
5-40 SYRINGE (ML) INJECTION PRN
Status: DISCONTINUED | OUTPATIENT
Start: 2022-08-03 | End: 2022-08-08 | Stop reason: HOSPADM

## 2022-08-03 RX ORDER — AMLODIPINE BESYLATE 5 MG/1
5 TABLET ORAL DAILY
Status: DISCONTINUED | OUTPATIENT
Start: 2022-08-03 | End: 2022-08-08 | Stop reason: HOSPADM

## 2022-08-03 RX ORDER — SODIUM CHLORIDE 0.9 % (FLUSH) 0.9 %
5-40 SYRINGE (ML) INJECTION EVERY 12 HOURS SCHEDULED
Status: DISCONTINUED | OUTPATIENT
Start: 2022-08-03 | End: 2022-08-08 | Stop reason: HOSPADM

## 2022-08-03 RX ADMIN — SODIUM CHLORIDE, PRESERVATIVE FREE 10 ML: 5 INJECTION INTRAVENOUS at 08:56

## 2022-08-03 RX ADMIN — MOMETASONE FUROATE AND FORMOTEROL FUMARATE DIHYDRATE 2 PUFF: 200; 5 AEROSOL RESPIRATORY (INHALATION) at 19:32

## 2022-08-03 RX ADMIN — AMIODARONE HYDROCHLORIDE 200 MG: 200 TABLET ORAL at 08:39

## 2022-08-03 RX ADMIN — CARVEDILOL 25 MG: 25 TABLET, FILM COATED ORAL at 08:39

## 2022-08-03 RX ADMIN — CEFTRIAXONE 2000 MG: 2 INJECTION, POWDER, FOR SOLUTION INTRAMUSCULAR; INTRAVENOUS at 08:39

## 2022-08-03 RX ADMIN — CLOPIDOGREL BISULFATE 75 MG: 75 TABLET ORAL at 08:39

## 2022-08-03 RX ADMIN — LIDOCAINE HYDROCHLORIDE 5 ML: 10 INJECTION, SOLUTION INFILTRATION; PERINEURAL at 17:35

## 2022-08-03 RX ADMIN — MOMETASONE FUROATE AND FORMOTEROL FUMARATE DIHYDRATE 2 PUFF: 200; 5 AEROSOL RESPIRATORY (INHALATION) at 07:59

## 2022-08-03 RX ADMIN — Medication 1250 MG: at 23:14

## 2022-08-03 RX ADMIN — CARVEDILOL 25 MG: 25 TABLET, FILM COATED ORAL at 18:20

## 2022-08-03 RX ADMIN — FUROSEMIDE 20 MG: 20 TABLET ORAL at 08:39

## 2022-08-03 RX ADMIN — MONTELUKAST 10 MG: 10 TABLET, FILM COATED ORAL at 20:58

## 2022-08-03 RX ADMIN — AMLODIPINE BESYLATE 5 MG: 5 TABLET ORAL at 08:43

## 2022-08-03 RX ADMIN — Medication 1250 MG: at 10:50

## 2022-08-03 RX ADMIN — TAMSULOSIN HYDROCHLORIDE 0.4 MG: 0.4 CAPSULE ORAL at 08:39

## 2022-08-03 RX ADMIN — MORPHINE SULFATE 15 MG: 15 TABLET, FILM COATED, EXTENDED RELEASE ORAL at 20:58

## 2022-08-03 RX ADMIN — LATANOPROST 1 DROP: 50 SOLUTION OPHTHALMIC at 08:56

## 2022-08-03 RX ADMIN — OXYCODONE AND ACETAMINOPHEN 2 TABLET: 5; 325 TABLET ORAL at 08:46

## 2022-08-03 RX ADMIN — APIXABAN 5 MG: 5 TABLET, FILM COATED ORAL at 20:58

## 2022-08-03 RX ADMIN — VALSARTAN 320 MG: 320 TABLET, FILM COATED ORAL at 08:39

## 2022-08-03 RX ADMIN — INSULIN LISPRO 1 UNITS: 100 INJECTION, SOLUTION INTRAVENOUS; SUBCUTANEOUS at 13:34

## 2022-08-03 RX ADMIN — CALCITONIN SALMON 1 SPRAY: 200 SPRAY, METERED NASAL at 08:44

## 2022-08-03 RX ADMIN — MORPHINE SULFATE 15 MG: 15 TABLET, FILM COATED, EXTENDED RELEASE ORAL at 08:39

## 2022-08-03 RX ADMIN — APIXABAN 5 MG: 5 TABLET, FILM COATED ORAL at 08:39

## 2022-08-03 ASSESSMENT — PAIN SCALES - GENERAL: PAINLEVEL_OUTOF10: 8

## 2022-08-03 ASSESSMENT — PAIN DESCRIPTION - DESCRIPTORS: DESCRIPTORS: SHOOTING;SHARP;SPASM

## 2022-08-03 ASSESSMENT — PAIN DESCRIPTION - LOCATION: LOCATION: BACK

## 2022-08-03 NOTE — PROGRESS NOTES
08/02/22 2245   NIV Type   Skin Assessment Clean, dry, & intact   NIV Started/Stopped On   Equipment Type Home CPAP   Mode Auto-PAP   Mask Type Nasal mask   Mask Size Large   Settings/Measurements   Resp 18   O2 Flow Rate (L/min) 0 L/min   FiO2  21 %   Mask Leak (lpm)   (Mask fits well.)   Comfort Level Good   SpO2 96   Patient's Home Machine Yes (type/vendor)

## 2022-08-03 NOTE — PROGRESS NOTES
PHYSICAL THERAPY Daily Note and AM  (Link to Caseload Tracking: PT Visit Days : 3  Time In/Out PT Charge Capture  Rehab Caseload Tracker  Orders      Yann Nascimento is a 65 y.o. male   PRIMARY DIAGNOSIS: Back pain  Back pain [M54.9]  General weakness [R53.1]  Acute cystitis without hematuria [N30.00]  Low back pain without sciatica, unspecified back pain laterality, unspecified chronicity [M54.50]       Inpatient: Payor: MEDICARE / Plan: MEDICARE PART A AND B / Product Type: *No Product type* /     ASSESSMENT:     REHAB RECOMMENDATIONS:   Recommendation to date pending progress:  Setting:  Short-term Rehab    Equipment:    To Be Determined     ASSESSMENT:  Mr. Jerica Abdul presents supine in bed and agreeable to PT/OT treatment. He has increased fatigue and some confusion today with difficulty following commands. He required mod A x2 to come sit EOB and then needed mod A to maintain upright position. He demonstrates a R sided lean today with decreased awareness and significant assistance to correct. Pt performed S2S with min A x2 and use of the RW and the bed elevated. SPT with mod A due to decreased command following and difficulty with mobility. No progress made this session. PT will continue to follow.       SUBJECTIVE:   Mr. Jerica Abdul states, \"Sometimes I can tell I'm leaning\"     Social/Functional Lives With: Spouse  Type of Home: House  Home Layout: Two level  Bathroom Toilet: Standard  Bathroom Equipment: Toilet raiser, Grab bars in shower  Bathroom Accessibility: Accessible  Home Equipment: [place], [address] Help From: Family  ADL Assistance: Independent  Homemaking Assistance: Independent  Ambulation Assistance: Independent  Transfer Assistance: Independent  Active : Yes  Mode of Transportation: Car  Occupation: Retired  OBJECTIVE:     PAIN: VITALS / O2: PRECAUTION / Axel Roch / DRAINS:   Pre Treatment:   Pain Assessment: None - Denies Pain      Post Treatment: 0/10 Vitals        Oxygen    Thom Catheter    RESTRICTIONS/PRECAUTIONS:  Restrictions/Precautions  Restrictions/Precautions: Fall Risk  Restrictions/Precautions: Fall Risk     MOBILITY: I Mod I S SBA CGA Min Mod Max Total  NT x2 Comments:   Bed Mobility    Rolling [] [] [] [] [] [] [x] [] [] [] []    Supine to Sit [] [] [] [] [] [] [x] [] [] [] [x]    Scooting [] [] [] [] [] [] [x] [] [] [] []    Sit to Supine [] [] [] [] [] [] [] [] [] [x] []    Transfers    Sit to Stand [] [] [] [] [] [x] [] [] [] [] [x]    Bed to Chair [] [] [] [] [] [] [x] [] [] [] [x] With SPT   Stand to Sit [] [] [] [] [] [x] [] [] [] [] [x]     [] [] [] [] [] [] [] [] [] [] []    I=Independent, Mod I=Modified Independent, S=Supervision, SBA=Standby Assistance, CGA=Contact Guard Assistance,   Min=Minimal Assistance, Mod=Moderate Assistance, Max=Maximal Assistance, Total=Total Assistance, NT=Not Tested    BALANCE: Good Fair+ Fair Fair- Poor NT Comments   Sitting Static [] [] [] [x] [] [] R sided lean   Sitting Dynamic [] [] [] [x] [] []              Standing Static [] [] [] [x] [] []    Standing Dynamic [] [] [] [] [x] []      GAIT: I Mod I S SBA CGA Min Mod Max Total  NT x2 Comments:   Level of Assistance [] [] [] [] [] [] [] [] [] [x] []    Distance   feet    DME N/A    Gait Quality N/A    Weightbearing Status      Stairs      I=Independent, Mod I=Modified Independent, S=Supervision, SBA=Standby Assistance, CGA=Contact Guard Assistance,   Min=Minimal Assistance, Mod=Moderate Assistance, Max=Maximal Assistance, Total=Total Assistance, NT=Not Tested    PLAN:   ACUTE PHYSICAL THERAPY GOALS:   (Developed with and agreed upon by patient and/or caregiver.)  (1.)Mr. Ana M Vann will move from supine to sit and sit to supine , scoot up and down, and roll side to side in bed using log roll technique with MODIFIED INDEPENDENCE within 7 treatment day(s). (2.)Mr. Ana M Vann will transfer from bed to chair and chair to bed with SUPERVISION using the least restrictive device within 7 treatment day(s). (3.)Mr. Judd Mcintohs will ambulate with STAND BY ASSIST for a minimum of 75 feet with the least restrictive device within 7 treatment day(s). FREQUENCY AND DURATION: 3 times/week for duration of hospital stay or until stated goals are met, whichever comes first.    TREATMENT:   TREATMENT:   Co-Treatment PT/OT necessary due to patient's decreased overall endurance/tolerance levels, as well as need for high level skilled assistance to complete functional transfers/mobility and functional tasks  Therapeutic Activity (25 Minutes): Therapeutic activity included Supine to Sit, Scooting, Transfer Training, Sitting balance , and Standing balance to improve functional Activity tolerance, Balance, Coordination, Mobility, and Strength.     TREATMENT GRID:   Date:  8/1/22 Date:   Date:     Activity/Exercise Parameters Parameters Parameters   AP x10     LAQ x10     Seated march x10                                 AFTER TREATMENT PRECAUTIONS: Bed/Chair Locked, Call light within reach, Chair, Needs within reach, RN notified, and Visitors at bedside    INTERDISCIPLINARY COLLABORATION:  RN/ PCT, PT/ PTA, and OT/ KATE    EDUCATION:      TIME IN/OUT:  Time In: 1115  Time Out: [facility]  Minutes: [address] [place], [place]

## 2022-08-03 NOTE — PROGRESS NOTES
abscess. Blood cultures were negative. ID was consulted and may need to 6 weeks of antibiotics treatment. IR guided drainage of the psoas abscess was performed. Culture results are pending. His Eliquis and Plavix were resumed after the procedure. His heparin drip was discontinued. He was started on vancomycin and ceftriaxone antibiotics. Hoang was placed as he had urinary retention of 500 mL. Neurology was consulted. Pain management with Percocet and morphine. Cardiology was consulted and ICD device was interrogated and functioning well. .  PT,OT recommended STR. Wife Blanche Blancas [phone], or [phone]   He is a DNI. Subjective/24hr Events (08/03/22): Patient is seen and examined at the bedside. He is complaining of severe back pain. His pain is well controlled with morphine. He is hard of hearing. Spouse at the bedside. Patient denies chest pain, shortness of breath, nausea, vomiting, diarrhea.     Assessment & Plan:     Back pain  Ortho following  Neurology signed off  Pain management with Percocet and morphine  PT,OT recommended STR     Newly diagnosed Epidural and psoas abscess  S/p IR guided drainage of the abscess and 3 ml is removed  Follow IR aspirate cultures  Continue antibiotics Vanco and ceftriaxone   May need long-term therapy with antibiotics for at least 6 weeks  Plan for PICC line on 8/3  ID is following, appreciate recommendations     Acute Osteomyelitis  Anticipate 6 weeks of iv antibiotic therapy      Acute cystitis without hematuria   urine culture showing  more than 50k gram-negative rods and pansensitive   Continue antibiotics     DNI (do not intubate)  Noted and discussed         Anemia  HGB 9 stable   Stable, chronic range for him  Trend HGB     Hypertension  Most likely due to pain  Diet controlled    Urinary retention  S/p Hoang  Plan for hoang removal on 8/3 and TOV   Continue Flomax    History of Serratia septicemia (10/23/14) in setting of R groin wound infection; S/P Right common femoral artery exploration/ repair of the right common femoral artery/ and sartorious myoplasty 11/5/14;   Operative tissue cx negative      Hyponatremia  Resolved     Ischemic cardiomyopathy  Plan:    Ventricular tachycardia ([facility]  Plan:    Coronary artery disease involving native coronary artery of native heart without angina pectoris  Plan:    PAD (peripheral artery disease) ([facility]  Plan: Active Problems:    ICD (implantable cardioverter-defibrillator) in place  S/p interrogation and was functioning well  Cardio signed off      Atrial fibrillation ([facility]  Plan:   Continue Eliquis and Plavix  Continue  amiodarone, statin      Severe obesity (BMI 35.0-39. 9) with comorbidity ([facility]  Plan:   Needs modification       COPD (chronic obstructive pulmonary disease) ([facility])  Plan:   Resume inhalers       DM (diabetes mellitus) type II, controlled, with peripheral vascular disorder ([facility]  Plan:   SSI       Obstructive sleep apnea  Plan:   Resume CPAP       Dispo/Discharge Planning: Anticipating hospital stay for 1-2 days. Pending IR aspirate cultures. Diet:  ADULT DIET; Regular; 3 carb choices (45 gm/meal); Low Fat/Low Chol/High Fiber/ANGELA  ADULT ORAL NUTRITION SUPPLEMENT; Breakfast, Lunch, Dinner; Diabetic Oral Supplement  ADULT ORAL NUTRITION SUPPLEMENT; Lunch, Dinner; Frozen Oral Supplement  DVT Ppx Eliquis  Code status: Limited    Hospital Problems             Last Modified POA    * (Principal) Back pain 7/27/2022 Yes    ICD (implantable cardioverter-defibrillator) in place 7/27/2022 Yes    General weakness 7/27/2022 Yes    Acute cystitis without hematuria 7/27/2022 Yes    Low back pain without sciatica 7/27/2022 Yes    DNI (do not intubate) (Chronic) 7/27/2022 Yes    UTI (urinary tract infection) 7/27/2022 Yes    Elevated liver enzymes 7/27/2022 Yes    Anemia (Chronic) 7/27/2022 Yes    Hyponatremia 7/27/2022 Yes    Atrial fibrillation ([facility] 7/27/2022 Yes    Severe obesity (BMI 35.0-39. 9) with comorbidity ([facility] 7/27/2022 Yes    COPD (chronic obstructive pulmonary disease) ([facility] 7/27/2022 Yes    Intermittent spinal claudication ([facility] 7/27/2022 Yes    Ischemic cardiomyopathy 7/27/2022 Yes    Ventricular tachycardia ([facility] 7/27/2022 Yes    Coronary artery disease involving native coronary artery of native heart without angina pectoris 7/27/2022 Yes    PAD (peripheral artery disease) ([facility] 7/27/2022 Yes    Asthma 7/27/2022 Yes    DM (diabetes mellitus) type II, controlled, with peripheral vascular disorder ([facility] 7/27/2022 Yes    Obstructive sleep apnea 7/27/2022 Yes       Objective:   Patient Vitals for the past 24 hrs:   Temp Pulse Resp BP SpO2   08/03/22 0802 -- 67 17 -- 90 %   08/03/22 0751 97.3 °F (36.3 °C) 61 18 (!) 166/87 92 %   08/03/22 0445 -- -- 18 -- --   08/03/22 0401 98.9 °F (37.2 °C) 61 18 (!) 165/75 91 %   08/02/22 2329 98.9 °F (37.2 °C) 60 17 (!) 127/94 90 %   08/02/22 2245 -- -- 18 -- --   08/02/22 1940 99 °F (37.2 °C) 61 16 138/74 92 %   08/02/22 1521 98.2 °F (36.8 °C) 61 20 (!) 144/70 92 %       Estimated body mass index is 34.29 kg/m² as calculated from the following:    Height as of this encounter: 6' 1\" (1.854 m). Weight as of this encounter: 259 lb 14 oz (117.9 kg). Intake/Output Summary (Last 24 hours) at 8/3/2022 1313  Last data filed at 8/3/2022 0917  Gross per 24 hour   Intake 1110 ml   Output 2375 ml   Net -1265 ml         Physical Exam:     Blood pressure (!) 166/87, pulse 67, temperature 97.3 °F (36.3 °C), temperature source Oral, resp. rate 17, height 6' 1\" (1.854 m), weight 259 lb 14 oz (117.9 kg), SpO2 90 %. General:    Well nourished. No overt distress, morbidly obese  Head:  Normocephalic, atraumatic  Eyes:  Sclerae appear normal. Pupils equally round. ENT:  Nares appear normal, no drainage. Moist oral mucosa  Neck:  No restricted ROM. Trachea midline. CV:   RRR. No m/r/g. No jugular venous distension. Lungs:   CTAB. No wheezing, rhonchi, or rales.  Respirations even, unlabored. Abdomen: Bowel sounds present. Soft, nontender, nondistended. Extremities: No cyanosis or clubbing. No edema. Tenderness of the back is present. Skin:     No rashes and normal coloration. Warm and dry. Neuro:  CN II-XII grossly intact. Sensation intact. A&Ox3  Psych:  Normal mood and affect. I have reviewed ordered lab tests and independently visualized imaging below:    Recent Labs:  Recent Results (from the past 48 hour(s))   Culture, Wound    Collection Time: 08/01/22  3:25 PM    Specimen: Abscess   Result Value Ref Range    Special Requests DISC     Gram stain 20 TO 50 WBCS SEEN /OIF     Gram stain NO DEFINITE ORGANISM SEEN      Culture NO GROWTH 1 DAY     POCT Glucose    Collection Time: 08/01/22  4:39 PM   Result Value Ref Range    POC Glucose 178 (H) 65 - 100 mg/dL    Performed by: Victorino Emanuel.     POCT Glucose    Collection Time: 08/01/22  8:36 PM   Result Value Ref Range    POC Glucose 209 (H) 65 - 100 mg/dL    Performed by: Romulo Miller    APTT    Collection Time: 08/01/22  9:21 PM   Result Value Ref Range    PTT 33.8 24.1 - 35.1 SEC   CBC with Auto Differential    Collection Time: 08/02/22  5:41 AM   Result Value Ref Range    WBC 7.2 4.3 - 11.1 K/uL    RBC 2.83 (L) 4.23 - 5.6 M/uL    Hemoglobin 8.9 (L) 13.6 - 17.2 g/dL    Hematocrit 27.1 (L) 41.1 - 50.3 %    MCV 95.8 79.6 - 97.8 FL    MCH 31.4 26.1 - 32.9 PG    MCHC 32.8 31.4 - 35.0 g/dL    RDW 14.8 (H) 11.9 - 14.6 %    Platelets 934 792 - 324 K/uL    MPV 10.0 9.4 - 12.3 FL    nRBC 0.00 0.0 - 0.2 K/uL    Differential Type AUTOMATED      Seg Neutrophils 74 43 - 78 %    Lymphocytes 14 13 - 44 %    Monocytes 9 4.0 - 12.0 %    Eosinophils % 0 (L) 0.5 - 7.8 %    Basophils 1 0.0 - 2.0 %    Immature Granulocytes 2 0.0 - 5.0 %    Segs Absolute 5.4 1.7 - 8.2 K/UL    Absolute Lymph # 1.0 0.5 - 4.6 K/UL    Absolute Mono # 0.7 0.1 - 1.3 K/UL    Absolute Eos # 0.0 0.0 - 0.8 K/UL    Basophils Absolute 0.1 0.0 - 0.2 K/UL    Absolute Immature Granulocyte 0.1 0.0 - 0.5 K/UL   Comprehensive Metabolic Panel w/ Reflex to MG    Collection Time: 08/02/22  5:41 AM   Result Value Ref Range    Sodium 134 (L) 136 - 145 mmol/L    Potassium 4.1 3.5 - 5.1 mmol/L    Chloride 97 (L) 98 - 107 mmol/L    CO2 32 21 - 32 mmol/L    Anion Gap 5 (L) 7 - 16 mmol/L    Glucose 161 (H) 65 - 100 mg/dL    BUN 8 8 - 23 MG/DL    Creatinine 0.60 (L) 0.8 - 1.5 MG/DL    GFR African American >60 >60 ml/min/1.73m2    GFR Non- >60 >60 ml/min/1.73m2    Calcium 8.5 8.3 - 10.4 MG/DL    Total Bilirubin 0.7 0.2 - 1.1 MG/DL     (H) 12 - 65 U/L    AST 50 (H) 15 - 37 U/L    Alk Phosphatase 137 (H) 50 - 136 U/L    Total Protein 6.1 (L) 6.3 - 8.2 g/dL    Albumin 2.1 (L) 3.2 - 4.6 g/dL    Globulin 4.0 (H) 2.3 - 3.5 g/dL    Albumin/Globulin Ratio 0.5 (L) 1.2 - 3.5     Phosphorus    Collection Time: 08/02/22  5:41 AM   Result Value Ref Range    Phosphorus 3.4 2.3 - 3.7 MG/DL   Anti-Xa, Unfractionated Heparin    Collection Time: 08/02/22  5:41 AM   Result Value Ref Range    Anti-XA Unfrac Heparin 0.50 0.3 - 0.7 IU/mL   APTT    Collection Time: 08/02/22  5:41 AM   Result Value Ref Range    PTT 63.6 (H) 24.1 - 35.1 SEC   POCT Glucose    Collection Time: 08/02/22  7:20 AM   Result Value Ref Range    POC Glucose 159 (H) 65 - 100 mg/dL    Performed by: Forrest    POCT Glucose    Collection Time: 08/02/22 11:42 AM   Result Value Ref Range    POC Glucose 197 (H) 65 - 100 mg/dL    Performed by: Forrest    Anti-Xa, Unfractionated Heparin    Collection Time: 08/02/22  1:24 PM   Result Value Ref Range    Anti-XA Unfrac Heparin >1.1 (H) 0.3 - 0.7 IU/mL   POCT Glucose    Collection Time: 08/02/22  4:51 PM   Result Value Ref Range    POC Glucose 190 (H) 65 - 100 mg/dL    Performed by: Forrest    POCT Glucose    Collection Time: 08/02/22  9:38 PM   Result Value Ref Range    POC Glucose 172 (H) 65 - 100 mg/dL    Performed by: AbubakarTykeishaPCT    CBC with Auto Differential    Collection Time: 08/03/22  6:02 AM   Result Value Ref Range    WBC 8.0 4.3 - 11.1 K/uL    RBC 2.82 (L) 4.23 - 5.6 M/uL    Hemoglobin 9.0 (L) 13.6 - 17.2 g/dL    Hematocrit 27.4 (L) 41.1 - 50.3 %    MCV 97.2 79.6 - 97.8 FL    MCH 31.9 26.1 - 32.9 PG    MCHC 32.8 31.4 - 35.0 g/dL    RDW 15.0 (H) 11.9 - 14.6 %    Platelets 267 821 - 114 K/uL    MPV 9.9 9.4 - 12.3 FL    nRBC 0.00 0.0 - 0.2 K/uL    Differential Type AUTOMATED      Seg Neutrophils 76 43 - 78 %    Lymphocytes 12 (L) 13 - 44 %    Monocytes 9 4.0 - 12.0 %    Eosinophils % 0 (L) 0.5 - 7.8 %    Basophils 1 0.0 - 2.0 %    Immature Granulocytes 2 0.0 - 5.0 %    Segs Absolute 6.2 1.7 - 8.2 K/UL    Absolute Lymph # 0.9 0.5 - 4.6 K/UL    Absolute Mono # 0.7 0.1 - 1.3 K/UL    Absolute Eos # 0.0 0.0 - 0.8 K/UL    Basophils Absolute 0.0 0.0 - 0.2 K/UL    Absolute Immature Granulocyte 0.1 0.0 - 0.5 K/UL   Comprehensive Metabolic Panel w/ Reflex to MG    Collection Time: 08/03/22  6:02 AM   Result Value Ref Range    Sodium 130 (L) 138 - 145 mmol/L    Potassium 4.3 3.5 - 5.1 mmol/L    Chloride 95 (L) 98 - 107 mmol/L    CO2 31 21 - 32 mmol/L    Anion Gap 4 (L) 7 - 16 mmol/L    Glucose 151 (H) 65 - 100 mg/dL    BUN 7 (L) 8 - 23 MG/DL    Creatinine 0.70 (L) 0.8 - 1.5 MG/DL    GFR African American >60 >60 ml/min/1.73m2    GFR Non- >60 >60 ml/min/1.73m2    Calcium 8.9 8.3 - 10.4 MG/DL    Total Bilirubin 0.7 0.2 - 1.1 MG/DL     (H) 12 - 65 U/L    AST 30 15 - 37 U/L    Alk Phosphatase 139 (H) 50 - 136 U/L    Total Protein 6.4 6.3 - 8.2 g/dL    Albumin 2.2 (L) 3.2 - 4.6 g/dL    Globulin 4.2 (H) 2.3 - 3.5 g/dL    Albumin/Globulin Ratio 0.5 (L) 1.2 - 3.5     Phosphorus    Collection Time: 08/03/22  6:02 AM   Result Value Ref Range    Phosphorus 3.3 2.3 - 3.7 MG/DL   Vancomycin Level, Random    Collection Time: 08/03/22  6:02 AM   Result Value Ref Range    Vancomycin Rm 15.2 UG/ML   POCT Glucose Collection Time: 08/03/22  7:51 AM   Result Value Ref Range    POC Glucose 158 (H) 65 - 100 mg/dL    Performed by: Denys          Other Studies:  XR LUMBAR SPINE (2-3 VIEWS)    Result Date: 7/27/2022  AUTOMATIC ADMINISTRATIVE RESULT The result for this exam can be found in the Progress note in the chart. See Progress note in the chart. CT LUMBAR SPINE WO CONTRAST    Result Date: 7/27/2022  EXAMINATION: CT LUMBAR SPINE WO CONTRAST 7/27/2022 6:21 PM ACCESSION NUMBER: [id number] COMPARISON: MRI lumbar spine 06/02/2022. INDICATION: Recent kyphoplasty with fall. TECHNIQUE: Multiple-row detector helical CT examination of the lumbar spine was performed without intravenous contrast. Multiplanar reformats were provided. Radiation dose reduction techniques were used for this study. Our CT scanners use one or all of the following: Automated exposure control, adjustment of the mA and/or kV according to patient size, iterative reconstruction. FINDINGS: Patient is status post interval kyphoplasty procedure at L2 and L4 when compared to prior MRI. Mild compression fractures are noted at these levels. No retropulsed fragments noted in the spinal canal. Multilevel degenerative disc changes are present. No malalignment. Areas of spinal canal stenosis suspected as described on recent prior MRI. Postoperative changes from kyphoplasty procedure at L2 and L4. No lumbar spine fracture or malalignment aside from the compression deformities. Multiple levels of probable spinal canal stenosis as described on recent MRI. XR CHEST PORTABLE    Result Date: 7/27/2022  XR CHEST PORTABLE 7/27/2022 6:35 PM HISTORY: Weakness. COMPARISON: Chest x-ray 3/7/2022. A portable AP view of the chest was obtained. Pulmonary infiltrate predominantly in the right upper lobe and mid to lower left lung has mostly cleared. Atelectasis or scarring right lung base appears stable. Stable cardiomegaly.  Implantable cardiac device left chest and both leads are unchanged. No pneumothorax. No pleural effusions.        Current Meds:  Current Facility-Administered Medications   Medication Dose Route Frequency    amLODIPine (NORVASC) tablet 5 mg  5 mg Oral Daily    [START ON 8/4/2022] cefTRIAXone (ROCEPHIN) 2,000 mg in sodium chloride 0.9 % 50 mL IVPB mini-bag  2,000 mg IntraVENous Q24H    oxyCODONE-acetaminophen (PERCOCET) 5-325 MG per tablet 1 tablet  1 tablet Oral Q4H PRN    vancomycin (VANCOCIN) 1250 mg in sodium chloride 0.9% 250 mL IVPB  1,250 mg IntraVENous Q12H    heparin (porcine) injection 2,000 Units  2,000 Units IntraVENous Once    morphine (MS CONTIN) extended release tablet 15 mg  15 mg Oral 2 times per day    amiodarone (CORDARONE) tablet 200 mg  200 mg Oral Daily    tamsulosin (FLOMAX) capsule 0.4 mg  0.4 mg Oral Daily    albuterol sulfate HFA (PROVENTIL;VENTOLIN;PROAIR) 108 (90 Base) MCG/ACT inhaler 2 puff  2 puff Inhalation Q4H PRN    apixaban (ELIQUIS) tablet 5 mg  5 mg Oral 2 times per day    calcitonin (MIACALCIN) nasal spray 1 spray  1 spray Alternating Nares Daily    carvedilol (COREG) tablet 25 mg  25 mg Oral BID WC    clopidogrel (PLAVIX) tablet 75 mg  75 mg Oral Daily    fluticasone (FLONASE) 50 MCG/ACT nasal spray 2 spray  2 spray Each Nostril Daily    mometasone-formoterol (DULERA) 200-5 MCG/ACT inhaler 2 puff  2 puff Inhalation BID    furosemide (LASIX) tablet 20 mg  20 mg Oral Daily    latanoprost (XALATAN) 0.005 % ophthalmic solution 1 drop  1 drop Ophthalmic Daily    montelukast (SINGULAIR) tablet 10 mg  10 mg Oral Nightly    [Held by provider] rosuvastatin (CRESTOR) tablet 10 mg  10 mg Oral Daily    valsartan (DIOVAN) tablet 320 mg  320 mg Oral Daily    sodium chloride flush 0.9 % injection 5-40 mL  5-40 mL IntraVENous 2 times per day    sodium chloride flush 0.9 % injection 5-40 mL  5-40 mL IntraVENous PRN    0.9 % sodium chloride infusion   IntraVENous PRN    ondansetron (ZOFRAN-ODT) disintegrating tablet 4 mg  4 mg Oral Q8H PRN    Or    ondansetron (ZOFRAN) injection 4 mg  4 mg IntraVENous Q6H PRN    polyethylene glycol (GLYCOLAX) packet 17 g  17 g Oral Daily PRN    acetaminophen (TYLENOL) tablet 650 mg  650 mg Oral Q6H PRN    Or    acetaminophen (TYLENOL) suppository 650 mg  650 mg Rectal Q6H PRN    glucose chewable tablet 16 g  4 tablet Oral PRN    dextrose bolus 10% 125 mL  125 mL IntraVENous PRN    Or    dextrose bolus 10% 250 mL  250 mL IntraVENous PRN    glucagon (rDNA) injection 1 mg  1 mg SubCUTAneous PRN    dextrose 10 % infusion   IntraVENous Continuous PRN    insulin lispro (HUMALOG) injection vial 0-4 Units  0-4 Units SubCUTAneous TID WC    insulin lispro (HUMALOG) injection vial 0-4 Units  0-4 Units SubCUTAneous Nightly       Signed:  Victorino Richards MD    Part of this note may have been written by using a voice dictation software. The note has been proof read but may still contain some grammatical/other typographical errors.

## 2022-08-03 NOTE — PROGRESS NOTES
Infectious Disease Progress Note      Today's Date: 8/3/2022   Admit Date: 7/27/2022    Impression:   MRI findings of L2-L4 osteodiscitis and psoas abscess in setting of kyphoplasty 7/20 for osteoporotic L2 and L4 compression fractures. 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 Plan:   Follow IR aspirate cultures  Continue Vancomycin and Ceftriaxone  Planning 6 weeks   Place PICC  Dispo: will likely need placement    Anti-infectives:   Ceftriaxone 7/27-7/29  Vancomycin 7/29 x 1  Pip/tazo 7/30 x 1    Subjective: Interval History: A  Seen with wife at bedside. Pain is about the same, reports no BM since admission. No fever. ID treatment plan reviewed. Patient is a 65 y.o. male with PMH of ICM with PM (EF 30-35%), DM2, recent L2-L4 kyphoplasty 7/20 who presented with back pain and LE weakness. Had several falls following the surgery and worsening weakness so recommended he present for evaluation and MRI. He has otherwise been afebrile, no white count, no report of surgical site issues. He was started on ceftriaxone for his urine, unclear as to why as there were no symptoms and urine was not consistent with infection. Unfortunately MRI was not done until yesterday due to his PM, findings of discitis/OM from L2-L4 with phelgmon and small L psoas abscess.  IR has been Time: 08/02/22 11:42 AM   Result Value Ref Range    POC Glucose 197 (H) 65 - 100 mg/dL    Performed by: Forrest    Anti-Xa, Unfractionated Heparin    Collection Time: 08/02/22  1:24 PM   Result Value Ref Range    Anti-XA Unfrac Heparin >1.1 (H) 0.3 - 0.7 IU/mL   POCT Glucose    Collection Time: 08/02/22  4:51 PM   Result Value Ref Range    POC Glucose 190 (H) 65 - 100 mg/dL    Performed by: Forrest    POCT Glucose    Collection Time: 08/02/22  9:38 PM   Result Value Ref Range    POC Glucose 172 (H) 65 - 100 mg/dL    Performed by: Mariann    CBC with Auto Differential    Collection Time: 08/03/22  6:02 AM   Result Value Ref Range    WBC 8.0 4.3 - 11.1 K/uL    RBC 2.82 (L) 4.23 - 5.6 M/uL    Hemoglobin 9.0 (L) 13.6 - 17.2 g/dL    Hematocrit 27.4 (L) 41.1 - 50.3 %    MCV 97.2 79.6 - 97.8 FL    MCH 31.9 26.1 - 32.9 PG    MCHC 32.8 31.4 - 35.0 g/dL    RDW 15.0 (H) 11.9 - 14.6 %    Platelets 785 493 - 766 K/uL    MPV 9.9 9.4 - 12.3 FL    nRBC 0.00 0.0 - 0.2 K/uL    Differential Type AUTOMATED      Seg Neutrophils 76 43 - 78 %    Lymphocytes 12 (L) 13 - 44 %    Monocytes 9 4.0 - 12.0 %    Eosinophils % 0 (L) 0.5 - 7.8 %    Basophils 1 0.0 - 2.0 %    Immature Granulocytes 2 0.0 - 5.0 %    Segs Absolute 6.2 1.7 - 8.2 K/UL    Absolute Lymph # 0.9 0.5 - 4.6 K/UL    Absolute Mono # 0.7 0.1 - 1.3 K/UL    Absolute Eos # 0.0 0.0 - 0.8 K/UL    Basophils Absolute 0.0 0.0 - 0.2 K/UL    Absolute Immature Granulocyte 0.1 0.0 - 0.5 K/UL   Comprehensive Metabolic Panel w/ Reflex to MG    Collection Time: 08/03/22  6:02 AM   Result Value Ref Range    Sodium 130 (L) 138 - 145 mmol/L    Potassium 4.3 3.5 - 5.1 mmol/L    Chloride 95 (L) 98 - 107 mmol/L    CO2 31 21 - 32 mmol/L    Anion Gap 4 (L) 7 - 16 mmol/L    Glucose 151 (H) 65 - 100 mg/dL    BUN 7 (L) 8 - 23 MG/DL    Creatinine 0.70 (L) 0.8 - 1.5 MG/DL    GFR African American >60 >60 ml/min/1.73m2    GFR Non- >60 >60 ml/min/1.73m2    Calcium 8.9 8.3 - 10.4 MG/DL    Total Bilirubin 0.7 0.2 - 1.1 MG/DL     (H) 12 - 65 U/L    AST 30 15 - 37 U/L    Alk Phosphatase 139 (H) 50 - 136 U/L    Total Protein 6.4 6.3 - 8.2 g/dL    Albumin 2.2 (L) 3.2 - 4.6 g/dL    Globulin 4.2 (H) 2.3 - 3.5 g/dL    Albumin/Globulin Ratio 0.5 (L) 1.2 - 3.5     Phosphorus    Collection Time: 08/03/22  6:02 AM   Result Value Ref Range    Phosphorus 3.3 2.3 - 3.7 MG/DL   Vancomycin Level, Random    Collection Time: 08/03/22  6:02 AM   Result Value Ref Range    Vancomycin Rm 15.2 UG/ML   POCT Glucose    Collection Time: 08/03/22  7:51 AM   Result Value Ref Range    POC Glucose 158 (H) 65 - 100 mg/dL    Performed by: Denys           Microbiology:  Reviewed    Studies:  Reviewed    Signed By: Nataly Treviño NP, APRN - CNP     August 3, 2022

## 2022-08-03 NOTE — PROGRESS NOTES
OCCUPATIONAL THERAPY Daily Note     OT Visit Days: 2   Time  OT Charge Capture  Rehab Caseload Tracker  OT Orders    Yunior Heard is a 65 y.o. male   PRIMARY DIAGNOSIS: Back pain  Back pain [M54.9]  General weakness [R53.1]  Acute cystitis without hematuria [N30.00]  Low back pain without sciatica, unspecified back pain laterality, unspecified chronicity [M54.50]       Inpatient: Payor: MEDICARE / Plan: MEDICARE PART A AND B / Product Type: *No Product type* /     ASSESSMENT:     REHAB RECOMMENDATIONS: CURRENT LEVEL OF FUNCTION:  (Most Recently Demonstrated)   Recommendation to date pending progress:  Setting:  Short-term Rehab    Equipment:    To Be Determined Bathing:  Maximal Assist  Dressing: Moderate Assist  Feeding/Grooming: Moderate Assist  Toileting:  Not Tested  Functional Mobility:  Moderate Assist x2     ASSESSMENT:  Mr. Jong Alas is not doing as well today. Pt presents supine upon arrival. Pt a slower with responding today. Slightly more confused as well. Pt required mod A x2 for bed mobility. Heavy right side lean noted at EOB. Required constant cueing and assistance to maintain upright posture. Required max A for bathing and dressing today due to level of alertness. Pt required min A x2 for sit to stand transfer and mod A x2. Left in chair. Minimal to no progress made today. Will continue to benefit from skilled OT during stay.        SUBJECTIVE:     Mr. Jong Alas states, \"I guess I will try\"     Social/Functional Lives With: Spouse  Type of Home: House  Home Layout: Two level  Bathroom Toilet: Standard  Bathroom Equipment: Toilet raiser, Grab bars in shower  Bathroom Accessibility: Accessible  Home Equipment: Rosy Goldmann, [address] Help From: Family  ADL Assistance: Independent  Homemaking Assistance: Independent  Ambulation Assistance: Independent  Transfer Assistance: Independent  Active : Yes  Mode of Transportation: Car  Occupation: Retired    OBJECTIVE:     London Martin / Marie Perez / AIRWAY: NA    RESTRICTIONS/PRECAUTIONS:  Restrictions/Precautions  Restrictions/Precautions: Fall Risk        PAIN: VITALS / O2:   Pre Treatment:              Post Treatment: 0 Vitals          Oxygen            MOBILITY: I Mod I S SBA CGA Min Mod Max Total  NT x2 Comments:   Bed Mobility    Rolling [] [] [] [] [] [] [x] [] [] [] [x]    Supine to Sit [] [] [] [] [] [] [x] [x] [] [] [x]    Scooting [] [] [] [] [] [x] [] [] [] [] [x]    Sit to Supine [] [] [] [] [] [] [] [] [] [x] []    Transfers    Sit to Stand [] [] [] [] [] [x] [] [] [] [] [x]    Bed to Chair [] [] [] [] [] [] [x] [] [] [] [x]    Stand to Sit [] [] [] [] [] [x] [] [] [] [] [x]    Tub/Shower [] [] [] [] [] [] [] [] [] [x] []     Toilet [] [] [] [] [] [] [] [] [] [x] []      [] [] [] [] [] [] [] [] [] [] []    I=Independent, Mod I=Modified Independent, S=Supervision/Setup, SBA=Standby Assistance, CGA=Contact Guard Assistance, Min=Minimal Assistance, Mod=Moderate Assistance, Max=Maximal Assistance, Total=Total Assistance, NT=Not Tested    ACTIVITIES OF DAILY LIVING: I Mod I S SBA CGA Min Mod Max Total NT Comments   BASIC ADLs:              Upper Body   Bathing [] [] [] [] [] [] [] [x] [] []    Lower Body Bathing [] [] [] [] [] [] [] [] [] [x]    Toileting [] [] [] [] [] [] [] [] [] [x]    Upper Body Dressing [] [] [] [] [] [] [x] [] [] [x]    Lower Body Dressing [] [] [] [] [] [] [] [] [x] []    Feeding [] [] [] [] [] [] [] [] [] [x]    Grooming [] [] [] [] [] [] [x] [] [] []    Personal Device Care [] [] [] [] [] [] [] [] [] [x]    Functional Mobility [] [] [] [] [] [x] [x] [] [] [] x2   I=Independent, Mod I=Modified Independent, S=Supervision/Setup, SBA=Standby Assistance, CGA=Contact Guard Assistance, Min=Minimal Assistance, Mod=Moderate Assistance, Max=Maximal Assistance, Total=Total Assistance, NT=Not Tested    BALANCE: Good Fair+ Fair Fair- Poor NT Comments   Sitting Static [] [] [] [x] [] []    Sitting Dynamic [] [] [] [] [x] [] Standing Static [] [] [x] [] [] []    Standing Dynamic [] [] [] [x] [] []        PLAN:     FREQUENCY/DURATION   OT Plan of Care: 3 times/week for duration of hospital stay or until stated goals are met, whichever comes first.    ACUTE OCCUPATIONAL THERAPY GOALS:   (Developed with and agreed upon by patient and/or caregiver.)  1. Patient will complete lower body bathing and dressing with Min A and adaptive equipment as  needed. 2. Patient will completed upper body bathing and dressing with SBA and adaptive equipment as needed. 3. Patient will complete grooming standing at sink with Mod I and adaptive equipment as needed. 4. Patient will complete toileting with CGA and adaptive equipment as needed. 5. Patient will tolerate 30 minutes of OT treatment with 1-2 rest breaks to increase activity tolerance for ADLs. 6. Patient will complete functional transfers with SBA and adaptive equipment as needed. Timeframe: 7 visits      TREATMENT:     TREATMENT:   Neuromuscular Re-education (15 Minutes): Neuromuscular Re-education included Balance Training, Coordination training, Postural training, Sitting balance training, and Standing balance training to improve Balance, Coordination, Functional Mobility, and Postural Control. Self Care (10 minutes): Patient participated in upper body bathing, upper body dressing, lower body dressing, and grooming ADLs in supported sitting and standing with moderate verbal, manual, and tactile cueing to increase independence, decrease assistance required, increase activity tolerance, and increase safety awareness. Patient also participated in functional transfer training to increase independence, decrease assistance required, increase activity tolerance, and increase safety awareness.      TREATMENT GRID:   Date:  8/1/22 Date:   Date:     Activity/Exercise Parameters Parameters Parameters   Punches 15 reps     Elbow Flexion 10 reps     Shoulder Flexion 10 reps AFTER TREATMENT PRECAUTIONS: Bed/Chair Locked, Call light within reach, Chair, Needs within reach, and Visitors at bedside    INTERDISCIPLINARY COLLABORATION:  RN/ PCT, PT/ PTA, and OT/ KATE    EDUCATION:       TOTAL TREATMENT DURATION AND TIME:  Time In: 1115  Time Out: [facility]  Minutes: 28    COLETTE Reid

## 2022-08-03 NOTE — PROGRESS NOTES
PICC Placement Note    PRE-PROCEDURE VERIFICATION    Correct Procedure: yes  Time out completed with assistant Anson Villanueva RN, VA-BC and all persons present in agreement with time out. Risks and benefits reviewed with Álvaro Montgomery, pt's spouse, (via telephone) and informed consent obtained prior to assessment and procedure. Correct Site: yes  Temperature: Temp: 98.1 °F (36.7 °C), Temperature Source:    Recent Labs     08/03/22  0602   BUN 7*      WBC 8.0     Allergies: Azithromycin and Oxaprozin  Education materials for [facility] Care given to patient or family. PROCEDURE DETAIL  A single lumen PICC line was started for Long term IV/antibiotic administration. The following documentation is in addition to the PICC properties in the lines/airways flowsheet:    Lot #: [id number]  Xylocaine used: Yes  Mid-Arm Circumference: 37 (cm)  Internal Catheter Length: 43 (cm)  External Catheter Length: 0 (cm)  Total Catheter Length: 43 (cm)  Vein Selection for PICC: right basilic  Central Line Insertion Bundle followed: Yes  Complication Related to Insertion: none    Both the insertion guidewire and ECG guidewire were removed intact all ports have positive blood return and were flush well with normal saline. The location of the tip of the PICC is verified using ECG technology. The tip is in the SVC per ECG reading. See image below.      Line is okay to use: yes      Lina Braun RN, PCCN, [facility]-BC

## 2022-08-03 NOTE — PROGRESS NOTES
Patient up in chair. Afebrile. Neuro intact. Pain a little more tolerable. A/P: Discitis and psoas abscess, superimposed on lumbar stenosis. Continue medical treatment with abx. No additional surgical intervention planned unless neurologic decline.

## 2022-08-03 NOTE — PROGRESS NOTES
Vascular Access Consult    Pt up in chair when VAT arrived to room for PICC insertion. Pt was disoriented and unable to follow commands and had a delayed verbal response to questions asked. Attempt to call patient's wife for informed consent. Message left.     Kymberly Stevens RN, PCCN, [facility]

## 2022-08-03 NOTE — PROGRESS NOTES
VANCO DAILY FOLLOW UP NOTE  [facility] Pharmacokinetic Monitoring Service - Vancomycin    Consulting Provider: Yael Edouard   Indication: L2-L4 osteodiscitis and psoas abscess  Target Concentration: Goal AUC/GIOVANNY 400-600 mg*hr/L  Day of Therapy: 2 (anticipate 6 weeks per ID)  Additional Antimicrobials: ceftriaxone    Pertinent Laboratory Values: Wt Readings from Last 1 Encounters:   08/01/22 259 lb 14 oz (117.9 kg)     Temp Readings from Last 1 Encounters:   08/03/22 97.3 °F (36.3 °C) (Oral)     Recent Labs     08/01/22  0646 08/02/22  0541 08/03/22  0602   BUN 7* 8 7*   CREATININE 0.60* 0.60* 0.70*   WBC 7.7 7.2 8.0     Estimated Creatinine Clearance: 117 mL/min (A) (based on SCr of 0.7 mg/dL (L)). Lab Results   Component Value Date/Time    VANCORANDOM 15.2 08/03/2022 06:02 AM       MRSA Nasal Swab: N/A. Non-respiratory infection. .      Assessment:  Date/Time Dose Concentration AUC   8/3 0602 1250 mg q12h 15.2 493   Note: Serum concentrations collected for AUC dosing may appear elevated if collected in close proximity to the dose administered, this is not necessarily an indication of toxicity    Plan:  Current dosing regimen is therapeutic.   Continue 1250 mg q12h  Repeat vancomycin concentrations will be ordered as clinically appropriate   Pharmacy will continue to monitor patient and adjust therapy as indicated    Thank you for the consult,  Chaz Hunter, PharmD, BCOP  Clinical Pharmacist  Contact via [employer]

## 2022-08-03 NOTE — PROGRESS NOTES
Vascular Access    Call back received from patient's wife. Risks and benefits reviewed with Jonel Owen (via telephone) and informed consent obtained prior to assessment and procedure.      Maikel Chase RN, PCCN, [facility]

## 2022-08-04 ENCOUNTER — TELEPHONE (OUTPATIENT)
Dept: INTERNAL MEDICINE CLINIC | Facility: CLINIC | Age: 77
End: 2022-08-04

## 2022-08-04 LAB
ALBUMIN SERPL-MCNC: 2.4 G/DL (ref 3.2–4.6)
ALBUMIN/GLOB SERPL: 0.5 {RATIO} (ref 1.2–3.5)
ALP SERPL-CCNC: 153 U/L (ref 50–136)
ALT SERPL-CCNC: 128 U/L (ref 12–65)
AMMONIA PLAS-SCNC: 10 UMOL/L (ref 11–32)
ANION GAP SERPL CALC-SCNC: 7 MMOL/L (ref 7–16)
ARTERIAL PATENCY WRIST A: POSITIVE
AST SERPL-CCNC: 26 U/L (ref 15–37)
BACTERIA SPEC CULT: NORMAL
BASE EXCESS BLD CALC-SCNC: 3.4 MMOL/L
BASOPHILS # BLD: 0 K/UL (ref 0–0.2)
BASOPHILS NFR BLD: 0 % (ref 0–2)
BDY SITE: ABNORMAL
BILIRUB SERPL-MCNC: 1.1 MG/DL (ref 0.2–1.1)
BUN SERPL-MCNC: 8 MG/DL (ref 8–23)
CALCIUM SERPL-MCNC: 9 MG/DL (ref 8.3–10.4)
CHLORIDE SERPL-SCNC: 93 MMOL/L (ref 98–107)
CO2 SERPL-SCNC: 31 MMOL/L (ref 21–32)
CREAT SERPL-MCNC: 0.7 MG/DL (ref 0.8–1.5)
DIFFERENTIAL METHOD BLD: ABNORMAL
EOSINOPHIL # BLD: 0 K/UL (ref 0–0.8)
EOSINOPHIL NFR BLD: 0 % (ref 0.5–7.8)
ERYTHROCYTE [DISTWIDTH] IN BLOOD BY AUTOMATED COUNT: 14.9 % (ref 11.9–14.6)
GAS FLOW.O2 O2 DELIVERY SYS: ABNORMAL L/MIN
GLOBULIN SER CALC-MCNC: 4.4 G/DL (ref 2.3–3.5)
GLUCOSE BLD STRIP.AUTO-MCNC: 166 MG/DL (ref 65–100)
GLUCOSE BLD STRIP.AUTO-MCNC: 193 MG/DL (ref 65–100)
GLUCOSE BLD STRIP.AUTO-MCNC: 194 MG/DL (ref 65–100)
GLUCOSE BLD STRIP.AUTO-MCNC: 234 MG/DL (ref 65–100)
GLUCOSE SERPL-MCNC: 177 MG/DL (ref 65–100)
GRAM STN SPEC: NORMAL
GRAM STN SPEC: NORMAL
HCO3 BLD-SCNC: 27 MMOL/L (ref 22–26)
HCT VFR BLD AUTO: 28.2 % (ref 41.1–50.3)
HGB BLD-MCNC: 9.3 G/DL (ref 13.6–17.2)
IMM GRANULOCYTES # BLD AUTO: 0.2 K/UL (ref 0–0.5)
IMM GRANULOCYTES NFR BLD AUTO: 2 % (ref 0–5)
LYMPHOCYTES # BLD: 0.7 K/UL (ref 0.5–4.6)
LYMPHOCYTES NFR BLD: 7 % (ref 13–44)
MCH RBC QN AUTO: 31.5 PG (ref 26.1–32.9)
MCHC RBC AUTO-ENTMCNC: 33 G/DL (ref 31.4–35)
MCV RBC AUTO: 95.6 FL (ref 79.6–97.8)
MONOCYTES # BLD: 0.9 K/UL (ref 0.1–1.3)
MONOCYTES NFR BLD: 9 % (ref 4–12)
NEUTS SEG # BLD: 8.1 K/UL (ref 1.7–8.2)
NEUTS SEG NFR BLD: 82 % (ref 43–78)
NRBC # BLD: 0 K/UL (ref 0–0.2)
PCO2 BLD: 37.1 MMHG (ref 35–45)
PH BLD: 7.47 [PH] (ref 7.35–7.45)
PHOSPHATE SERPL-MCNC: 3.7 MG/DL (ref 2.3–3.7)
PLATELET # BLD AUTO: 259 K/UL (ref 150–450)
PMV BLD AUTO: 10 FL (ref 9.4–12.3)
PO2 BLD: 92 MMHG (ref 75–100)
POTASSIUM SERPL-SCNC: 4.3 MMOL/L (ref 3.5–5.1)
PROT SERPL-MCNC: 6.8 G/DL (ref 6.3–8.2)
RBC # BLD AUTO: 2.95 M/UL (ref 4.23–5.6)
RPR SER QL: NONREACTIVE
SAO2 % BLD: 97.7 % (ref 95–98)
SERVICE CMNT-IMP: ABNORMAL
SERVICE CMNT-IMP: NORMAL
SODIUM SERPL-SCNC: 131 MMOL/L (ref 138–145)
SPECIMEN TYPE: ABNORMAL
TSH, 3RD GENERATION: 1.5 UIU/ML (ref 0.36–3.74)
VIT B12 SERPL-MCNC: 1490 PG/ML (ref 193–986)
WBC # BLD AUTO: 9.9 K/UL (ref 4.3–11.1)

## 2022-08-04 PROCEDURE — 6360000002 HC RX W HCPCS: Performed by: INTERNAL MEDICINE

## 2022-08-04 PROCEDURE — 84443 ASSAY THYROID STIM HORMONE: CPT

## 2022-08-04 PROCEDURE — 36592 COLLECT BLOOD FROM PICC: CPT

## 2022-08-04 PROCEDURE — 1100000000 HC RM PRIVATE

## 2022-08-04 PROCEDURE — 6370000000 HC RX 637 (ALT 250 FOR IP): Performed by: INTERNAL MEDICINE

## 2022-08-04 PROCEDURE — 82140 ASSAY OF AMMONIA: CPT

## 2022-08-04 PROCEDURE — 82803 BLOOD GASES ANY COMBINATION: CPT

## 2022-08-04 PROCEDURE — 2580000003 HC RX 258: Performed by: NURSE PRACTITIONER

## 2022-08-04 PROCEDURE — 84100 ASSAY OF PHOSPHORUS: CPT

## 2022-08-04 PROCEDURE — 6360000002 HC RX W HCPCS: Performed by: NURSE PRACTITIONER

## 2022-08-04 PROCEDURE — 6370000000 HC RX 637 (ALT 250 FOR IP): Performed by: STUDENT IN AN ORGANIZED HEALTH CARE EDUCATION/TRAINING PROGRAM

## 2022-08-04 PROCEDURE — 82962 GLUCOSE BLOOD TEST: CPT

## 2022-08-04 PROCEDURE — 80053 COMPREHEN METABOLIC PANEL: CPT

## 2022-08-04 PROCEDURE — 36600 WITHDRAWAL OF ARTERIAL BLOOD: CPT

## 2022-08-04 PROCEDURE — 94640 AIRWAY INHALATION TREATMENT: CPT

## 2022-08-04 PROCEDURE — 82607 VITAMIN B-12: CPT

## 2022-08-04 PROCEDURE — 86592 SYPHILIS TEST NON-TREP QUAL: CPT

## 2022-08-04 PROCEDURE — 94660 CPAP INITIATION&MGMT: CPT

## 2022-08-04 PROCEDURE — 85025 COMPLETE CBC W/AUTO DIFF WBC: CPT

## 2022-08-04 PROCEDURE — 94760 N-INVAS EAR/PLS OXIMETRY 1: CPT

## 2022-08-04 PROCEDURE — 6370000000 HC RX 637 (ALT 250 FOR IP): Performed by: NURSE PRACTITIONER

## 2022-08-04 PROCEDURE — 2700000000 HC OXYGEN THERAPY PER DAY

## 2022-08-04 RX ORDER — LIDOCAINE 4 G/G
1 PATCH TOPICAL DAILY
Status: DISCONTINUED | OUTPATIENT
Start: 2022-08-04 | End: 2022-08-08 | Stop reason: HOSPADM

## 2022-08-04 RX ORDER — MORPHINE SULFATE 2 MG/ML
2 INJECTION, SOLUTION INTRAMUSCULAR; INTRAVENOUS EVERY 4 HOURS PRN
Status: DISCONTINUED | OUTPATIENT
Start: 2022-08-04 | End: 2022-08-07

## 2022-08-04 RX ORDER — BISACODYL 10 MG
10 SUPPOSITORY, RECTAL RECTAL DAILY PRN
Status: DISCONTINUED | OUTPATIENT
Start: 2022-08-04 | End: 2022-08-08 | Stop reason: HOSPADM

## 2022-08-04 RX ADMIN — VALSARTAN 320 MG: 320 TABLET, FILM COATED ORAL at 08:39

## 2022-08-04 RX ADMIN — AMIODARONE HYDROCHLORIDE 200 MG: 200 TABLET ORAL at 08:39

## 2022-08-04 RX ADMIN — CALCITONIN SALMON 1 SPRAY: 200 SPRAY, METERED NASAL at 10:47

## 2022-08-04 RX ADMIN — Medication 1250 MG: at 21:35

## 2022-08-04 RX ADMIN — MORPHINE SULFATE 15 MG: 15 TABLET, FILM COATED, EXTENDED RELEASE ORAL at 08:36

## 2022-08-04 RX ADMIN — Medication 1250 MG: at 10:47

## 2022-08-04 RX ADMIN — LATANOPROST 1 DROP: 50 SOLUTION OPHTHALMIC at 08:42

## 2022-08-04 RX ADMIN — SODIUM CHLORIDE, PRESERVATIVE FREE 10 ML: 5 INJECTION INTRAVENOUS at 08:45

## 2022-08-04 RX ADMIN — SODIUM CHLORIDE, PRESERVATIVE FREE 10 ML: 5 INJECTION INTRAVENOUS at 21:34

## 2022-08-04 RX ADMIN — INSULIN LISPRO 1 UNITS: 100 INJECTION, SOLUTION INTRAVENOUS; SUBCUTANEOUS at 12:30

## 2022-08-04 RX ADMIN — MOMETASONE FUROATE AND FORMOTEROL FUMARATE DIHYDRATE 2 PUFF: 200; 5 AEROSOL RESPIRATORY (INHALATION) at 07:38

## 2022-08-04 RX ADMIN — CARVEDILOL 25 MG: 25 TABLET, FILM COATED ORAL at 08:40

## 2022-08-04 RX ADMIN — AMLODIPINE BESYLATE 5 MG: 5 TABLET ORAL at 08:41

## 2022-08-04 RX ADMIN — MORPHINE SULFATE 15 MG: 15 TABLET, FILM COATED, EXTENDED RELEASE ORAL at 21:35

## 2022-08-04 RX ADMIN — CLOPIDOGREL BISULFATE 75 MG: 75 TABLET ORAL at 08:38

## 2022-08-04 RX ADMIN — CEFTRIAXONE 2000 MG: 2 INJECTION, POWDER, FOR SOLUTION INTRAMUSCULAR; INTRAVENOUS at 08:42

## 2022-08-04 RX ADMIN — FUROSEMIDE 20 MG: 20 TABLET ORAL at 08:41

## 2022-08-04 RX ADMIN — APIXABAN 5 MG: 5 TABLET, FILM COATED ORAL at 21:35

## 2022-08-04 RX ADMIN — APIXABAN 5 MG: 5 TABLET, FILM COATED ORAL at 08:38

## 2022-08-04 RX ADMIN — FLUTICASONE PROPIONATE 2 SPRAY: 50 SPRAY, METERED NASAL at 08:42

## 2022-08-04 RX ADMIN — CARVEDILOL 25 MG: 25 TABLET, FILM COATED ORAL at 18:20

## 2022-08-04 RX ADMIN — MONTELUKAST 10 MG: 10 TABLET, FILM COATED ORAL at 21:35

## 2022-08-04 RX ADMIN — ACETAMINOPHEN 650 MG: 325 TABLET ORAL at 21:36

## 2022-08-04 RX ADMIN — MOMETASONE FUROATE AND FORMOTEROL FUMARATE DIHYDRATE 2 PUFF: 200; 5 AEROSOL RESPIRATORY (INHALATION) at 20:12

## 2022-08-04 RX ADMIN — TAMSULOSIN HYDROCHLORIDE 0.4 MG: 0.4 CAPSULE ORAL at 08:41

## 2022-08-04 ASSESSMENT — PAIN SCALES - GENERAL
PAINLEVEL_OUTOF10: 4
PAINLEVEL_OUTOF10: 9
PAINLEVEL_OUTOF10: 0
PAINLEVEL_OUTOF10: 4
PAINLEVEL_OUTOF10: 5

## 2022-08-04 ASSESSMENT — PAIN DESCRIPTION - LOCATION
LOCATION: BACK
LOCATION: BACK
LOCATION: BACK;PELVIS
LOCATION: BACK

## 2022-08-04 ASSESSMENT — PAIN DESCRIPTION - ORIENTATION
ORIENTATION: LOWER
ORIENTATION: LOWER

## 2022-08-04 ASSESSMENT — PAIN DESCRIPTION - DESCRIPTORS
DESCRIPTORS: ACHING;THROBBING
DESCRIPTORS: ACHING;THROBBING
DESCRIPTORS: ACHING

## 2022-08-04 ASSESSMENT — PAIN - FUNCTIONAL ASSESSMENT: PAIN_FUNCTIONAL_ASSESSMENT: PREVENTS OR INTERFERES WITH MANY ACTIVE NOT PASSIVE ACTIVITIES

## 2022-08-04 NOTE — PROGRESS NOTES
Patient placed on home CPAP. Patient resting comfortable on Mask, will be monitored throughout the night.        08/03/22 2330   NIV Type   Skin Assessment Clean, dry, & intact   NIV Started/Stopped On   Equipment Type home CPAP   Mode Auto-PAP   Mask Type Nasal mask   Mask Size Large   Settings/Measurements   Resp 20   Mask Leak (lpm)   (mask fits well)   Comfort Level Good   Using Accessory Muscles No   SpO2 94   Patient's Home Machine Yes (type/vendor)

## 2022-08-04 NOTE — TELEPHONE ENCOUNTER
----- Message from Caleb Tjsanti sent at 8/4/2022  4:19 PM EDT -----  Subject: Message to Provider    QUESTIONS  Information for Provider? patient called in regards to to aware the office   that her  is in the hospital and she received a letter about him   having a referral to starting pt . Patient called to get information in   this concern for better understanding . Please reach out to patient in this   concern   ---------------------------------------------------------------------------  --------------  [address] [employer]  [phone]; OK to leave message on voicemail  ---------------------------------------------------------------------------  --------------  SCRIPT ANSWERS  Relationship to Patient? Other  Representative Name? spouse jose   Is the Representative on the appropriate HIPAA document in Epic?  Yes

## 2022-08-04 NOTE — PROGRESS NOTES
Hospitalist Progress Note   Admit Date:  2022  3:41 PM   Name:  Courtney Kennedy   Age:  65 y.o. Sex:  male  :  1945   MRN:  [id number]   Room:  [facility]/      Reason(s) for Admission: Back pain [M54.9]  General weakness [R53.1]  Acute cystitis without hematuria [N30.00]  Low back pain without sciatica, unspecified back pain laterality, unspecified chronicity [M54.50]     Hospital Course & Interval History:   Courtney Kennedy is a 65 y.o. male with medical history of ICM with ICD/PPM, EF 30-35% afib on eliquis, DM2, obesity, PAD, anemia, COPD/ asthma, CAD, SHERI on CPAP with recent L2-4 kyphoplasty evaluated with diffuse  bilateral LE weakness. He states this has been worse since his kyphoplasty and he has had several falls. Lives with wife Lillian Ibarra. Seen at ortho spine today and ordered MRI Lspine to evaluate ongoing pain and weakness. Did have negative bone biopsy with kyphoplasty. Felt worse and not better post procedure. Unable to ambulate well, using walker, has trouble getting up from seated position. No LE paresthesia and no urine issues excluding some issues starting stream. Had some loose BM and can't make it to the bathroom in time. No fever. No dysuria. Urine is darker. Has ICD/PPM- will need clearance for MRI. CT Lspine shows \"  Postoperative changes from kyphoplasty procedure at L2 and L4. No lumbar spine   fracture or malalignment aside from the compression deformities. Multiple levels   of probable spinal canal stenosis as described on recent MRI. \"  UA positive. urine culture showing  more than 50k gram-negative rods and pan sensitive. He was treated with Rocephin empirically. CXR with improved infiltrates. COVID negative. MRI brain no acute infarct. MRI lumbar and cervical spine showed epidural, psoas abscess and osteomyelitis. CRP is elevated 12.4. His Eliquis and Plavix were stopped. He was on heparin drip.   Antibiotics were discontinued and IR was consulted for draining the abscess. Blood cultures were negative. ID was consulted and may need to 6 weeks of antibiotics treatment. IR guided drainage of the psoas abscess was performed. Culture results are pending. His Eliquis and Plavix were resumed after the procedure. His heparin drip was discontinued. He was started on vancomycin and ceftriaxone antibiotics. Hoang was placed as he had urinary retention of 500 mL. Neurology was consulted. Pain management with Percocet and morphine. Cardiology was consulted and ICD device was interrogated and functioning well. .  PT,OT recommended STR. Wife Elif Barrera [phone], or [phone]   He is a DNI. Subjective/24hr Events (08/04/22): Pt seen and evaluated. Case d/w Wife at the [facility]. All questions answered. Wife concerned about pt being restless overnight. I queried the pt on this and he states that it is due to pain. Pt is more confused though. Assessment & Plan:     1.) Back pain  Ortho following  Neurology signed off  Pain management with Percocet and morphine  PT,OT recommended STR     2.) Newly diagnosed Epidural and psoas abscess  S/p IR guided drainage of the abscess and 3 ml is removed  Follow IR aspirate cultures  Continue antibiotics Vanco and ceftriaxone   May need long-term therapy with antibiotics for at least 6 weeks  Plan for PICC line   ID is following, appreciate recommendations with abx X 6 weeks.       3.) Acute Osteomyelitis  Anticipate 6 weeks of iv antibiotic therapy      4.) Acute cystitis without hematuria   urine culture showing  more than 50k gram-negative rods and pansensitive   Continue antibiotics    5.) DNI (do not intubate)  Noted and discussed     6.) AMS - prob due to metabolic encephalopathy, ck Met panel, ck am labs, tx pain, Recent MRI Brain ok.     7.) Anemia  HGB 9 stable   Stable, chronic range for him  Trend HGB     8.) Hypertension  Most likely due to pain  Diet controlled    9.) Urinary retention  S/p Hoang  Plan for hoang removal on 8/3 and TOV   Continue Flomax    10.) H/O Serratia Sepsis - (10/23/14) in setting of R groin wound infection; S/P Right common femoral artery exploration/ repair of the right common femoral artery/ and sartorious myoplasty 11/5/14;   Operative tissue cx negative     11.) Hyponatremia  Resolved    12.) Ischemic cardiomyopathy Hx - appears compensated    13.) Ventricular tachycardia Hx - ([facility]    14.) Coronary artery disease involving native coronary - med rx    15.) PAD (peripheral artery disease) ([facility]      16.) ICD (implantable cardioverter-defibrillator) in place  S/p interrogation and was functioning well  Cardio signed off    17.) Atrial fibrillation ([facility]  Plan:   Continue Eliquis and Plavix  Continue  amiodarone, statin      18.) Severe obesity (BMI 35.0-39. 9) with comorbidity ([facility]  Plan:   Needs modification      19.) COPD (chronic obstructive pulmonary disease) ([facility])  Plan:   Resume inhalers      20.) DM (diabetes mellitus) type II, controlled, with peripheral vascular disorder ([facility])  Plan:   SSI      21.) Obstructive sleep apnea  Plan:   Resume CPAP     22.) Debility    Dispo/Discharge Planning: Anticipating hospital stay for 1-2 days. Pending IR aspirate cultures. Plan:    Ck met panel  Ck CT head  Improve pain control  Ck ABG  Case d/w wife and ID      Diet:  ADULT DIET; Regular; 3 carb choices (45 gm/meal);  Low Fat/Low Chol/High Fiber/ANGELA  ADULT ORAL NUTRITION SUPPLEMENT; Breakfast, Lunch, Dinner; Diabetic Oral Supplement  ADULT ORAL NUTRITION SUPPLEMENT; Lunch, Dinner; Frozen Oral Supplement  DVT Ppx Eliquis  Code status: Limited    Hospital Problems             Last Modified POA    * (Principal) Back pain 7/27/2022 Yes    ICD (implantable cardioverter-defibrillator) in place 7/27/2022 Yes    General weakness 7/27/2022 Yes    Acute cystitis without hematuria 7/27/2022 Yes    Low back pain without sciatica 7/27/2022 Yes    DNI (do not intubate) (Chronic) 7/27/2022 Yes    UTI (urinary tract infection) 7/27/2022 Yes    Elevated liver enzymes 7/27/2022 Yes    Anemia (Chronic) 7/27/2022 Yes    Hyponatremia 7/27/2022 Yes    Atrial fibrillation ([facility] 7/27/2022 Yes    Severe obesity (BMI 35.0-39. 9) with comorbidity ([facility] 7/27/2022 Yes    COPD (chronic obstructive pulmonary disease) ([facility] 7/27/2022 Yes    Intermittent spinal claudication ([facility] 7/27/2022 Yes    Ischemic cardiomyopathy 7/27/2022 Yes    Ventricular tachycardia ([facility] 7/27/2022 Yes    Coronary artery disease involving native coronary artery of native heart without angina pectoris 7/27/2022 Yes    PAD (peripheral artery disease) ([facility] 7/27/2022 Yes    Asthma 7/27/2022 Yes    DM (diabetes mellitus) type II, controlled, with peripheral vascular disorder ([facility] 7/27/2022 Yes    Obstructive sleep apnea 7/27/2022 Yes     Objective:   Patient Vitals for the past 24 hrs:   Temp Pulse Resp BP SpO2   08/04/22 1440 99 °F (37.2 °C) 64 22 114/61 98 %   08/04/22 1310 -- -- -- -- 96 %   08/04/22 1300 98.6 °F (37 °C) 65 20 121/72 (!) 89 %   08/04/22 0841 98.6 °F (37 °C) 68 20 (!) 150/85 93 %   08/04/22 0800 98.6 °F (37 °C) 68 20 (!) 150/85 --   08/04/22 0218 98.1 °F (36.7 °C) 68 18 (!) 143/70 91 %   08/03/22 2330 -- -- 20 -- --   08/03/22 2315 -- -- -- (!) 143/89 --   08/03/22 2231 98.2 °F (36.8 °C) 71 18 (!) 160/79 90 %   08/03/22 2058 -- -- 20 -- --   08/03/22 1932 -- 65 17 -- 95 %   08/03/22 1926 98.7 °F (37.1 °C) 66 18 (!) 152/99 93 %   08/03/22 1600 98.1 °F (36.7 °C) 60 20 (!) 150/76 94 %         Estimated body mass index is 34.29 kg/m² as calculated from the following:    Height as of this encounter: 6' 1\" (1.854 m). Weight as of this encounter: 259 lb 14 oz (117.9 kg). Intake/Output Summary (Last 24 hours) at 8/4/2022 1557  Last data filed at 8/4/2022 0858  Gross per 24 hour   Intake 760 ml   Output 1750 ml   Net -990 ml           Physical Exam:     Blood pressure 114/61, pulse 64, temperature 99 °F (37.2 °C), temperature source Oral, resp.  rate 22, height 6' 1\" (1.854 m), weight 259 lb 14 oz (117.9 kg), SpO2 98 %. General:    Ill appearing, morbidly obese  Head:  Normocephalic, atraumatic  Eyes:  Sclerae appear normal. Pupils equally round. ENT:  Nares appear normal, no drainage. Moist oral mucosa  Neck:  No restricted ROM. Trachea midline. CV:   RRR. No m/r/g. No jugular venous distension. Lungs:   Diminished. No wheezing, rhonchi, or rales. Respirations even, unlabored. Abdomen: Bowel sounds present. Soft, nontender, nondistended. Extremities: No cyanosis or clubbing. No edema. Tenderness of the back is present. Skin:     No rashes and normal coloration. Warm and dry. Neuro:  CN II-XII grossly intact. Sensation intact. A&Ox3  Psych:  Normal mood and affect.       I have reviewed ordered lab tests and independently visualized imaging below:    Recent Labs:  Recent Results (from the past 48 hour(s))   POCT Glucose    Collection Time: 08/02/22  4:51 PM   Result Value Ref Range    POC Glucose 190 (H) 65 - 100 mg/dL    Performed by: Forrest    POCT Glucose    Collection Time: 08/02/22  9:38 PM   Result Value Ref Range    POC Glucose 172 (H) 65 - 100 mg/dL    Performed by: Mariann    CBC with Auto Differential    Collection Time: 08/03/22  6:02 AM   Result Value Ref Range    WBC 8.0 4.3 - 11.1 K/uL    RBC 2.82 (L) 4.23 - 5.6 M/uL    Hemoglobin 9.0 (L) 13.6 - 17.2 g/dL    Hematocrit 27.4 (L) 41.1 - 50.3 %    MCV 97.2 79.6 - 97.8 FL    MCH 31.9 26.1 - 32.9 PG    MCHC 32.8 31.4 - 35.0 g/dL    RDW 15.0 (H) 11.9 - 14.6 %    Platelets 485 571 - 078 K/uL    MPV 9.9 9.4 - 12.3 FL    nRBC 0.00 0.0 - 0.2 K/uL    Differential Type AUTOMATED      Seg Neutrophils 76 43 - 78 %    Lymphocytes 12 (L) 13 - 44 %    Monocytes 9 4.0 - 12.0 %    Eosinophils % 0 (L) 0.5 - 7.8 %    Basophils 1 0.0 - 2.0 %    Immature Granulocytes 2 0.0 - 5.0 %    Segs Absolute 6.2 1.7 - 8.2 K/UL    Absolute Lymph # 0.9 0.5 - 4.6 K/UL    Absolute Mono # 0.7 0.1 - 1.3 K/UL    Absolute Eos # 0.0 0.0 - 0.8 K/UL    Basophils Absolute 0.0 0.0 - 0.2 K/UL    Absolute Immature Granulocyte 0.1 0.0 - 0.5 K/UL   Comprehensive Metabolic Panel w/ Reflex to MG    Collection Time: 08/03/22  6:02 AM   Result Value Ref Range    Sodium 130 (L) 138 - 145 mmol/L    Potassium 4.3 3.5 - 5.1 mmol/L    Chloride 95 (L) 98 - 107 mmol/L    CO2 31 21 - 32 mmol/L    Anion Gap 4 (L) 7 - 16 mmol/L    Glucose 151 (H) 65 - 100 mg/dL    BUN 7 (L) 8 - 23 MG/DL    Creatinine 0.70 (L) 0.8 - 1.5 MG/DL    GFR African American >60 >60 ml/min/1.73m2    GFR Non- >60 >60 ml/min/1.73m2    Calcium 8.9 8.3 - 10.4 MG/DL    Total Bilirubin 0.7 0.2 - 1.1 MG/DL     (H) 12 - 65 U/L    AST 30 15 - 37 U/L    Alk Phosphatase 139 (H) 50 - 136 U/L    Total Protein 6.4 6.3 - 8.2 g/dL    Albumin 2.2 (L) 3.2 - 4.6 g/dL    Globulin 4.2 (H) 2.3 - 3.5 g/dL    Albumin/Globulin Ratio 0.5 (L) 1.2 - 3.5     Phosphorus    Collection Time: 08/03/22  6:02 AM   Result Value Ref Range    Phosphorus 3.3 2.3 - 3.7 MG/DL   Vancomycin Level, Random    Collection Time: 08/03/22  6:02 AM   Result Value Ref Range    Vancomycin Rm 15.2 UG/ML   POCT Glucose    Collection Time: 08/03/22  7:51 AM   Result Value Ref Range    POC Glucose 158 (H) 65 - 100 mg/dL    Performed by: Denys    POCT Glucose    Collection Time: 08/03/22  1:28 PM   Result Value Ref Range    POC Glucose 244 (H) 65 - 100 mg/dL    Performed by: Hussein Mccarthy. POCT Glucose    Collection Time: 08/03/22  4:02 PM   Result Value Ref Range    POC Glucose 183 (H) 65 - 100 mg/dL    Performed by: Hussein Mccarthy.     POCT Glucose    Collection Time: 08/03/22  8:50 PM   Result Value Ref Range    POC Glucose 180 (H) 65 - 100 mg/dL    Performed by: Mariann    POCT Glucose    Collection Time: 08/04/22  8:04 AM   Result Value Ref Range    POC Glucose 166 (H) 65 - 100 mg/dL    Performed by: Bhavna Segura    CBC with Auto Differential    Collection Time: 08/04/22  8:30 AM   Result Value Ref Range    WBC 9.9 4.3 - 11.1 K/uL    RBC 2.95 (L) 4.23 - 5.6 M/uL    Hemoglobin 9.3 (L) 13.6 - 17.2 g/dL    Hematocrit 28.2 (L) 41.1 - 50.3 %    MCV 95.6 79.6 - 97.8 FL    MCH 31.5 26.1 - 32.9 PG    MCHC 33.0 31.4 - 35.0 g/dL    RDW 14.9 (H) 11.9 - 14.6 %    Platelets 538 718 - 907 K/uL    MPV 10.0 9.4 - 12.3 FL    nRBC 0.00 0.0 - 0.2 K/uL    Differential Type AUTOMATED      Seg Neutrophils 82 (H) 43 - 78 %    Lymphocytes 7 (L) 13 - 44 %    Monocytes 9 4.0 - 12.0 %    Eosinophils % 0 (L) 0.5 - 7.8 %    Basophils 0 0.0 - 2.0 %    Immature Granulocytes 2 0.0 - 5.0 %    Segs Absolute 8.1 1.7 - 8.2 K/UL    Absolute Lymph # 0.7 0.5 - 4.6 K/UL    Absolute Mono # 0.9 0.1 - 1.3 K/UL    Absolute Eos # 0.0 0.0 - 0.8 K/UL    Basophils Absolute 0.0 0.0 - 0.2 K/UL    Absolute Immature Granulocyte 0.2 0.0 - 0.5 K/UL   Comprehensive Metabolic Panel w/ Reflex to MG    Collection Time: 08/04/22  8:30 AM   Result Value Ref Range    Sodium 131 (L) 138 - 145 mmol/L    Potassium 4.3 3.5 - 5.1 mmol/L    Chloride 93 (L) 98 - 107 mmol/L    CO2 31 21 - 32 mmol/L    Anion Gap 7 7 - 16 mmol/L    Glucose 177 (H) 65 - 100 mg/dL    BUN 8 8 - 23 MG/DL    Creatinine 0.70 (L) 0.8 - 1.5 MG/DL    GFR African American >60 >60 ml/min/1.73m2    GFR Non- >60 >60 ml/min/1.73m2    Calcium 9.0 8.3 - 10.4 MG/DL    Total Bilirubin 1.1 0.2 - 1.1 MG/DL     (H) 12 - 65 U/L    AST 26 15 - 37 U/L    Alk Phosphatase 153 (H) 50 - 136 U/L    Total Protein 6.8 6.3 - 8.2 g/dL    Albumin 2.4 (L) 3.2 - 4.6 g/dL    Globulin 4.4 (H) 2.3 - 3.5 g/dL    Albumin/Globulin Ratio 0.5 (L) 1.2 - 3.5     Phosphorus    Collection Time: 08/04/22  8:30 AM   Result Value Ref Range    Phosphorus 3.7 2.3 - 3.7 MG/DL   Ammonia    Collection Time: 08/04/22  8:30 AM   Result Value Ref Range    Ammonia 10 (L) 11 - 32 UMOL/L   Vitamin B12    Collection Time: 08/04/22  8:30 AM   Result Value Ref Range    Vitamin B-12 1490 (H) 193 - 986 pg/mL   TSH    Collection Time: 08/04/22  8:30 AM   Result Value Ref Range    TSH, 3RD GENERATION 1.500 0.358 - 3.740 uIU/mL   POCT Glucose    Collection Time: 08/04/22 11:36 AM   Result Value Ref Range    POC Glucose 234 (H) 65 - 100 mg/dL    Performed by: Gisela Isaac          Other Studies:  XR LUMBAR SPINE (2-3 VIEWS)    Result Date: 7/27/2022  AUTOMATIC ADMINISTRATIVE RESULT The result for this exam can be found in the Progress note in the chart. See Progress note in the chart. CT LUMBAR SPINE WO CONTRAST    Result Date: 7/27/2022  EXAMINATION: CT LUMBAR SPINE WO CONTRAST 7/27/2022 6:21 PM ACCESSION NUMBER: [id number] COMPARISON: MRI lumbar spine 06/02/2022. INDICATION: Recent kyphoplasty with fall. TECHNIQUE: Multiple-row detector helical CT examination of the lumbar spine was performed without intravenous contrast. Multiplanar reformats were provided. Radiation dose reduction techniques were used for this study. Our CT scanners use one or all of the following: Automated exposure control, adjustment of the mA and/or kV according to patient size, iterative reconstruction. FINDINGS: Patient is status post interval kyphoplasty procedure at L2 and L4 when compared to prior MRI. Mild compression fractures are noted at these levels. No retropulsed fragments noted in the spinal canal. Multilevel degenerative disc changes are present. No malalignment. Areas of spinal canal stenosis suspected as described on recent prior MRI. Postoperative changes from kyphoplasty procedure at L2 and L4. No lumbar spine fracture or malalignment aside from the compression deformities. Multiple levels of probable spinal canal stenosis as described on recent MRI. XR CHEST PORTABLE    Result Date: 7/27/2022  XR CHEST PORTABLE 7/27/2022 6:35 PM HISTORY: Weakness. COMPARISON: Chest x-ray 3/7/2022. A portable AP view of the chest was obtained.      Pulmonary infiltrate predominantly in the right upper lobe and mid to lower left lung has mostly cleared. Atelectasis or scarring right lung base appears stable. Stable cardiomegaly. Implantable cardiac device left chest and both leads are unchanged. No pneumothorax. No pleural effusions.        Current Meds:  Current Facility-Administered Medications   Medication Dose Route Frequency    bisacodyl (DULCOLAX) suppository 10 mg  10 mg Rectal Daily PRN    morphine injection 2 mg  2 mg IntraVENous Q4H PRN    lidocaine 4 % external patch 1 patch  1 patch TransDERmal Daily    amLODIPine (NORVASC) tablet 5 mg  5 mg Oral Daily    cefTRIAXone (ROCEPHIN) 2,000 mg in sodium chloride 0.9 % 50 mL IVPB mini-bag  2,000 mg IntraVENous Q24H    oxyCODONE-acetaminophen (PERCOCET) 5-325 MG per tablet 1 tablet  1 tablet Oral Q4H PRN    sodium chloride flush 0.9 % injection 5-40 mL  5-40 mL IntraVENous 2 times per day    sodium chloride flush 0.9 % injection 5-40 mL  5-40 mL IntraVENous PRN    0.9 % sodium chloride infusion  25 mL IntraVENous PRN    vancomycin (VANCOCIN) 1250 mg in sodium chloride 0.9% 250 mL IVPB  1,250 mg IntraVENous Q12H    heparin (porcine) injection 2,000 Units  2,000 Units IntraVENous Once    morphine (MS CONTIN) extended release tablet 15 mg  15 mg Oral 2 times per day    amiodarone (CORDARONE) tablet 200 mg  200 mg Oral Daily    tamsulosin (FLOMAX) capsule 0.4 mg  0.4 mg Oral Daily    albuterol sulfate HFA (PROVENTIL;VENTOLIN;PROAIR) 108 (90 Base) MCG/ACT inhaler 2 puff  2 puff Inhalation Q4H PRN    apixaban (ELIQUIS) tablet 5 mg  5 mg Oral 2 times per day    calcitonin (MIACALCIN) nasal spray 1 spray  1 spray Alternating Nares Daily    carvedilol (COREG) tablet 25 mg  25 mg Oral BID WC    clopidogrel (PLAVIX) tablet 75 mg  75 mg Oral Daily    fluticasone (FLONASE) 50 MCG/ACT nasal spray 2 spray  2 spray Each Nostril Daily    mometasone-formoterol (DULERA) 200-5 MCG/ACT inhaler 2 puff  2 puff Inhalation BID    furosemide (LASIX) tablet 20 mg  20 mg Oral Daily    latanoprost (XALATAN) 0.005 % ophthalmic solution 1 drop  1 drop Ophthalmic Daily    montelukast (SINGULAIR) tablet 10 mg  10 mg Oral Nightly    [Held by provider] rosuvastatin (CRESTOR) tablet 10 mg  10 mg Oral Daily    valsartan (DIOVAN) tablet 320 mg  320 mg Oral Daily    sodium chloride flush 0.9 % injection 5-40 mL  5-40 mL IntraVENous 2 times per day    sodium chloride flush 0.9 % injection 5-40 mL  5-40 mL IntraVENous PRN    0.9 % sodium chloride infusion   IntraVENous PRN    ondansetron (ZOFRAN-ODT) disintegrating tablet 4 mg  4 mg Oral Q8H PRN    Or    ondansetron (ZOFRAN) injection 4 mg  4 mg IntraVENous Q6H PRN    polyethylene glycol (GLYCOLAX) packet 17 g  17 g Oral Daily PRN    acetaminophen (TYLENOL) tablet 650 mg  650 mg Oral Q6H PRN    Or    acetaminophen (TYLENOL) suppository 650 mg  650 mg Rectal Q6H PRN    glucose chewable tablet 16 g  4 tablet Oral PRN    dextrose bolus 10% 125 mL  125 mL IntraVENous PRN    Or    dextrose bolus 10% 250 mL  250 mL IntraVENous PRN    glucagon (rDNA) injection 1 mg  1 mg SubCUTAneous PRN    dextrose 10 % infusion   IntraVENous Continuous PRN    insulin lispro (HUMALOG) injection vial 0-4 Units  0-4 Units SubCUTAneous TID WC    insulin lispro (HUMALOG) injection vial 0-4 Units  0-4 Units SubCUTAneous Nightly       Signed:      Marissa Garcia MD, [address]  Internal Medicine - Hospitalist    Part of this note may have been written by using a voice dictation software. The note has been proof read but may still contain some grammatical/other typographical errors.

## 2022-08-04 NOTE — PLAN OF CARE
Patient was confused this morning and very restless in bed with jerky movements. MD was informed and additional labs drawn and lidocaine patch placed on pt lower back per order. Pt placed back on home Cpap when wife arrived to room. Patients mentation has appeared to improve some over the course of the day, though he has been drowsy. Pt currently resting still in bed with eyes closed. Pt offered pain medication, but declined as patch seemed to help. Patient rounded on hourly by myself or patient assistant and encouraged to call with any needs. Wife at bedside part of the day. No signs of distress noted. Bed low, locked, call bell within reach. Vital signs have remained stable. No signs or symptoms of distress noted. Jules Jiménez RN    Problem: Pain  Goal: Verbalizes/displays adequate comfort level or baseline comfort level  Outcome: Progressing     Problem: Safety - Adult  Goal: Free from fall injury  Outcome: Progressing     Problem: Chronic Conditions and Co-morbidities  Goal: Patient's chronic conditions and co-morbidity symptoms are monitored and maintained or improved  Outcome: Progressing  Flowsheets (Taken 8/4/2022 0815)  Care Plan - Patient's Chronic Conditions and Co-Morbidity Symptoms are Monitored and Maintained or Improved: Monitor and assess patient's chronic conditions and comorbid symptoms for stability, deterioration, or improvement     Problem: Skin/Tissue Integrity  Goal: Absence of new skin breakdown  Description: 1. Monitor for areas of redness and/or skin breakdown  2. Assess vascular access sites hourly  3. Every 4-6 hours minimum:  Change oxygen saturation probe site  4. Every 4-6 hours:  If on nasal continuous positive airway pressure, respiratory therapy assess nares and determine need for appliance change or resting period.   Outcome: Progressing     Problem: ABCDS Injury Assessment  Goal: Absence of physical injury  Outcome: Progressing     Problem: Respiratory - Adult  Goal: activity

## 2022-08-04 NOTE — PROGRESS NOTES
Infectious Disease Progress Note      Today's Date: 8/4/2022   Admit Date: 7/27/2022    Impression:   L2-L4 osteodiscitis and psoas abscess in setting of kyphoplasty 7/20 for osteoporotic L2 and L4 compression fractures. 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 Plan:   Encephalopathic, primary team aware and following. May need to hold off on DC. ? ABG  IR aspirate cultures negative, likely sec to antbx pre-procedure  Continue Vancomycin (aim trough 15-20) and Ceftriaxone 2g IV Q24hrs  Planning 6 weeks EOT 9/9/22  Dispo: will likely need placement. Recommend weekly CBC with diff, CRP, LFTs and bi-weekly VT, Cr.  ID follow up     Anti-infectives:   Ceftriaxone 7/27-7/29  Vancomycin 7/29 x 1  Pip/tazo 7/30 x 1    Subjective: Interval History:  Seen with wife at bedside, pt restless this am. Discussed with Dr Mic Almanza. Patient is a 65 y.o. male with PMH of ICM with PM (EF 30-35%), DM2, recent L2-L4 kyphoplasty 7/20 who presented with back pain and LE weakness. Had several falls following the surgery and worsening weakness so recommended he present for evaluation and MRI. He has otherwise been afebrile, no white count, no report of surgical site issues.  He was started on ceftriaxone for his urine, unclear as to why as there were no symptoms and urine was not consistent with infection. Unfortunately MRI was not done until yesterday due to his PM, findings of discitis/OM from L2-L4 with phelgmon and small L psoas abscess. IR has been consulted for aspirate of the area, right now surgery note indicates hoping to hold on further surgical intervention and treat medically. No blood cultures obtained. Allergies   Allergen Reactions    Azithromycin Hives    Oxaprozin Other (See Comments)     ELEVATED BLOOD PRESSURE        Review of Systems:  All systems reviewed and negative except as otherwise stated in the HPI    Objective:   Blood pressure (!) 150/85, pulse 68, temperature 98.6 °F (37 °C), temperature source Oral, resp. rate 20, height 6' 1\" (1.854 m), weight 259 lb 14 oz (117.9 kg), SpO2 93 %.   Visit Vitals  BP (!) 150/85   Pulse 68   Temp 98.6 °F (37 °C) (Oral)   Resp 20   Ht 6' 1\" (1.854 m)   Wt 259 lb 14 oz (117.9 kg)   SpO2 93%   BMI 34.29 kg/m²     Temp (24hrs), Av.3 °F (36.8 °C), Min:97.9 °F (36.6 °C), Max:98.7 °F (37.1 °C)       Exam:    Patient examined 2022, exam remains unchanged except as noted below:    General:  Alert, restless, no acute distress   Head:  Normocephalic, atraumatic    Eyes:  Anicteric, no drainage/not injected   Throat: Mucus membranes moist OP with food   Neck: Supple, symmetrical, trachea midline, no JVD   Lungs:    Difficult auscultate, no increased work of breathing, CPAP   Heart:  Regular rate and rhythm, without murmur   Abdomen:   Soft, non-tender, no guarding, no distention, bowel sounds active   Extremities: Extremities without edema, pulses palpable   Skin: Skin without rash     Lines/Devices: RUE PICC          CBC:  Recent Labs     22  0541 22  0602 22  0830   WBC 7.2 8.0 9.9   HGB 8.9* 9.0* 9.3*   HCT 27.1* 27.4* 28.2*    259 259         BMP:  Recent Labs     22  0541 22  0622  0830   BUN 8 7* 8   * 130* 131*   K 4.1 4.3 4.3   CL 97* 95* 93*   CO2 32 31 31 LFTS:  Recent Labs     08/02/22  0541 08/03/22  0602 08/04/22  0830   * 154* 128*         Data Review:   Recent Results (from the past 24 hour(s))   POCT Glucose    Collection Time: 08/03/22  1:28 PM   Result Value Ref Range    POC Glucose 244 (H) 65 - 100 mg/dL    Performed by: Deepthi Witt. POCT Glucose    Collection Time: 08/03/22  4:02 PM   Result Value Ref Range    POC Glucose 183 (H) 65 - 100 mg/dL    Performed by: Deepthi Witt.     POCT Glucose    Collection Time: 08/03/22  8:50 PM   Result Value Ref Range    POC Glucose 180 (H) 65 - 100 mg/dL    Performed by: Mariann    POCT Glucose    Collection Time: 08/04/22  8:04 AM   Result Value Ref Range    POC Glucose 166 (H) 65 - 100 mg/dL    Performed by: Ameya Fuentes    CBC with Auto Differential    Collection Time: 08/04/22  8:30 AM   Result Value Ref Range    WBC 9.9 4.3 - 11.1 K/uL    RBC 2.95 (L) 4.23 - 5.6 M/uL    Hemoglobin 9.3 (L) 13.6 - 17.2 g/dL    Hematocrit 28.2 (L) 41.1 - 50.3 %    MCV 95.6 79.6 - 97.8 FL    MCH 31.5 26.1 - 32.9 PG    MCHC 33.0 31.4 - 35.0 g/dL    RDW 14.9 (H) 11.9 - 14.6 %    Platelets 672 909 - 620 K/uL    MPV 10.0 9.4 - 12.3 FL    nRBC 0.00 0.0 - 0.2 K/uL    Differential Type AUTOMATED      Seg Neutrophils 82 (H) 43 - 78 %    Lymphocytes 7 (L) 13 - 44 %    Monocytes 9 4.0 - 12.0 %    Eosinophils % 0 (L) 0.5 - 7.8 %    Basophils 0 0.0 - 2.0 %    Immature Granulocytes 2 0.0 - 5.0 %    Segs Absolute 8.1 1.7 - 8.2 K/UL    Absolute Lymph # 0.7 0.5 - 4.6 K/UL    Absolute Mono # 0.9 0.1 - 1.3 K/UL    Absolute Eos # 0.0 0.0 - 0.8 K/UL    Basophils Absolute 0.0 0.0 - 0.2 K/UL    Absolute Immature Granulocyte 0.2 0.0 - 0.5 K/UL   Comprehensive Metabolic Panel w/ Reflex to MG    Collection Time: 08/04/22  8:30 AM   Result Value Ref Range    Sodium 131 (L) 138 - 145 mmol/L    Potassium 4.3 3.5 - 5.1 mmol/L    Chloride 93 (L) 98 - 107 mmol/L    CO2 31 21 - 32 mmol/L    Anion Gap 7 7 - 16 mmol/L    Glucose 177 (H) 65 - 100 mg/dL    BUN 8 8 - 23 MG/DL    Creatinine 0.70 (L) 0.8 - 1.5 MG/DL    GFR African American >60 >60 ml/min/1.73m2    GFR Non- >60 >60 ml/min/1.73m2    Calcium 9.0 8.3 - 10.4 MG/DL    Total Bilirubin 1.1 0.2 - 1.1 MG/DL     (H) 12 - 65 U/L    AST 26 15 - 37 U/L    Alk Phosphatase 153 (H) 50 - 136 U/L    Total Protein 6.8 6.3 - 8.2 g/dL    Albumin 2.4 (L) 3.2 - 4.6 g/dL    Globulin 4.4 (H) 2.3 - 3.5 g/dL    Albumin/Globulin Ratio 0.5 (L) 1.2 - 3.5     Phosphorus    Collection Time: 08/04/22  8:30 AM   Result Value Ref Range    Phosphorus 3.7 2.3 - 3.7 MG/DL   Ammonia    Collection Time: 08/04/22  8:30 AM   Result Value Ref Range    Ammonia 10 (L) 11 - 32 UMOL/L   Vitamin B12    Collection Time: 08/04/22  8:30 AM   Result Value Ref Range    Vitamin B-12 1490 (H) 193 - 986 pg/mL   TSH    Collection Time: 08/04/22  8:30 AM   Result Value Ref Range    TSH, 3RD GENERATION 1.500 0.358 - 3.740 uIU/mL          Microbiology:  Reviewed    Studies:  Reviewed    Signed By: Sam Lyn NP, APRN - CNP     August 4, 2022

## 2022-08-04 NOTE — PROGRESS NOTES
VANCO DAILY FOLLOW UP NOTE  [address] [facility] Pharmacokinetic Monitoring Service - Vancomycin    Consulting Provider: Ashley Paulson   Indication: L2-L4 osteodiscitis and psoas abscess  Target Concentration: Goal AUC/GIOVANNY 400-600 mg*hr/L  Day of Therapy: 3 (anticipate 6 weeks per ID)  Additional Antimicrobials: ceftriaxone    Pertinent Laboratory Values: Wt Readings from Last 1 Encounters:   08/01/22 259 lb 14 oz (117.9 kg)     Temp Readings from Last 1 Encounters:   08/04/22 98.6 °F (37 °C) (Oral)     Recent Labs     08/02/22  0541 08/03/22  0602 08/04/22  0830   BUN 8 7* 8   CREATININE 0.60* 0.70* 0.70*   WBC 7.2 8.0 9.9     Estimated Creatinine Clearance: 117 mL/min (A) (based on SCr of 0.7 mg/dL (L)). Lab Results   Component Value Date/Time    VANCORANDOM 15.2 08/03/2022 06:02 AM       MRSA Nasal Swab: N/A. Non-respiratory infection. .      Assessment:  Date/Time Dose Concentration AUC   8/3 0602 1250 mg q12h 15.2 493   Note: Serum concentrations collected for AUC dosing may appear elevated if collected in close proximity to the dose administered, this is not necessarily an indication of toxicity    Plan:  Current dosing regimen is therapeutic.   Continue 1250 mg q12h  Repeat vancomycin concentrations will be ordered as clinically appropriate   Pharmacy will continue to monitor patient and adjust therapy as indicated    Thank you for the consult,  Nathanael Palacio, PharmD, BCOP  Clinical Pharmacist  Contact via [employer]

## 2022-08-05 LAB
GLUCOSE BLD STRIP.AUTO-MCNC: 173 MG/DL (ref 65–100)
GLUCOSE BLD STRIP.AUTO-MCNC: 182 MG/DL (ref 65–100)
GLUCOSE BLD STRIP.AUTO-MCNC: 198 MG/DL (ref 65–100)
GLUCOSE BLD STRIP.AUTO-MCNC: 226 MG/DL (ref 65–100)
SERVICE CMNT-IMP: ABNORMAL

## 2022-08-05 PROCEDURE — 2580000003 HC RX 258: Performed by: INTERNAL MEDICINE

## 2022-08-05 PROCEDURE — 2700000000 HC OXYGEN THERAPY PER DAY

## 2022-08-05 PROCEDURE — 6360000002 HC RX W HCPCS: Performed by: NURSE PRACTITIONER

## 2022-08-05 PROCEDURE — 1100000000 HC RM PRIVATE

## 2022-08-05 PROCEDURE — 6360000002 HC RX W HCPCS: Performed by: INTERNAL MEDICINE

## 2022-08-05 PROCEDURE — 82962 GLUCOSE BLOOD TEST: CPT

## 2022-08-05 PROCEDURE — 94760 N-INVAS EAR/PLS OXIMETRY 1: CPT

## 2022-08-05 PROCEDURE — 94640 AIRWAY INHALATION TREATMENT: CPT

## 2022-08-05 PROCEDURE — 6370000000 HC RX 637 (ALT 250 FOR IP): Performed by: INTERNAL MEDICINE

## 2022-08-05 PROCEDURE — 94660 CPAP INITIATION&MGMT: CPT

## 2022-08-05 PROCEDURE — 2580000003 HC RX 258: Performed by: NURSE PRACTITIONER

## 2022-08-05 PROCEDURE — 6370000000 HC RX 637 (ALT 250 FOR IP): Performed by: STUDENT IN AN ORGANIZED HEALTH CARE EDUCATION/TRAINING PROGRAM

## 2022-08-05 PROCEDURE — 6370000000 HC RX 637 (ALT 250 FOR IP): Performed by: NURSE PRACTITIONER

## 2022-08-05 RX ORDER — QUETIAPINE FUMARATE 25 MG/1
25 TABLET, FILM COATED ORAL NIGHTLY
Status: DISCONTINUED | OUTPATIENT
Start: 2022-08-05 | End: 2022-08-07

## 2022-08-05 RX ORDER — TRAMADOL HYDROCHLORIDE 50 MG/1
50 TABLET ORAL EVERY 6 HOURS PRN
Status: DISCONTINUED | OUTPATIENT
Start: 2022-08-05 | End: 2022-08-07

## 2022-08-05 RX ADMIN — INSULIN LISPRO 1 UNITS: 100 INJECTION, SOLUTION INTRAVENOUS; SUBCUTANEOUS at 16:34

## 2022-08-05 RX ADMIN — FUROSEMIDE 20 MG: 20 TABLET ORAL at 08:37

## 2022-08-05 RX ADMIN — AMLODIPINE BESYLATE 5 MG: 5 TABLET ORAL at 08:38

## 2022-08-05 RX ADMIN — CARVEDILOL 25 MG: 25 TABLET, FILM COATED ORAL at 16:35

## 2022-08-05 RX ADMIN — Medication 1250 MG: at 10:34

## 2022-08-05 RX ADMIN — TAMSULOSIN HYDROCHLORIDE 0.4 MG: 0.4 CAPSULE ORAL at 08:37

## 2022-08-05 RX ADMIN — VALSARTAN 320 MG: 320 TABLET, FILM COATED ORAL at 08:37

## 2022-08-05 RX ADMIN — CARVEDILOL 25 MG: 25 TABLET, FILM COATED ORAL at 08:38

## 2022-08-05 RX ADMIN — MONTELUKAST 10 MG: 10 TABLET, FILM COATED ORAL at 21:02

## 2022-08-05 RX ADMIN — SODIUM CHLORIDE, PRESERVATIVE FREE 10 ML: 5 INJECTION INTRAVENOUS at 08:36

## 2022-08-05 RX ADMIN — CALCITONIN SALMON 1 SPRAY: 200 SPRAY, METERED NASAL at 09:00

## 2022-08-05 RX ADMIN — Medication 1250 MG: at 22:47

## 2022-08-05 RX ADMIN — FLUTICASONE PROPIONATE 2 SPRAY: 50 SPRAY, METERED NASAL at 08:39

## 2022-08-05 RX ADMIN — SODIUM CHLORIDE, PRESERVATIVE FREE 10 ML: 5 INJECTION INTRAVENOUS at 21:03

## 2022-08-05 RX ADMIN — SODIUM CHLORIDE, PRESERVATIVE FREE 10 ML: 5 INJECTION INTRAVENOUS at 10:39

## 2022-08-05 RX ADMIN — AMIODARONE HYDROCHLORIDE 200 MG: 200 TABLET ORAL at 08:37

## 2022-08-05 RX ADMIN — CLOPIDOGREL BISULFATE 75 MG: 75 TABLET ORAL at 08:37

## 2022-08-05 RX ADMIN — QUETIAPINE FUMARATE 25 MG: 25 TABLET ORAL at 21:02

## 2022-08-05 RX ADMIN — MORPHINE SULFATE 15 MG: 15 TABLET, FILM COATED, EXTENDED RELEASE ORAL at 21:02

## 2022-08-05 RX ADMIN — APIXABAN 5 MG: 5 TABLET, FILM COATED ORAL at 08:37

## 2022-08-05 RX ADMIN — MOMETASONE FUROATE AND FORMOTEROL FUMARATE DIHYDRATE 2 PUFF: 200; 5 AEROSOL RESPIRATORY (INHALATION) at 08:09

## 2022-08-05 RX ADMIN — APIXABAN 5 MG: 5 TABLET, FILM COATED ORAL at 21:02

## 2022-08-05 RX ADMIN — LATANOPROST 1 DROP: 50 SOLUTION OPHTHALMIC at 08:39

## 2022-08-05 RX ADMIN — MORPHINE SULFATE 2 MG: 2 INJECTION, SOLUTION INTRAMUSCULAR; INTRAVENOUS at 05:53

## 2022-08-05 RX ADMIN — MORPHINE SULFATE 15 MG: 15 TABLET, FILM COATED, EXTENDED RELEASE ORAL at 08:56

## 2022-08-05 RX ADMIN — CEFTRIAXONE 2000 MG: 2 INJECTION, POWDER, FOR SOLUTION INTRAMUSCULAR; INTRAVENOUS at 09:13

## 2022-08-05 RX ADMIN — MOMETASONE FUROATE AND FORMOTEROL FUMARATE DIHYDRATE 2 PUFF: 200; 5 AEROSOL RESPIRATORY (INHALATION) at 20:12

## 2022-08-05 ASSESSMENT — PAIN SCALES - GENERAL
PAINLEVEL_OUTOF10: 10
PAINLEVEL_OUTOF10: 6
PAINLEVEL_OUTOF10: 6

## 2022-08-05 ASSESSMENT — PAIN DESCRIPTION - ORIENTATION: ORIENTATION: LOWER

## 2022-08-05 ASSESSMENT — PAIN DESCRIPTION - FREQUENCY: FREQUENCY: INTERMITTENT

## 2022-08-05 ASSESSMENT — PAIN - FUNCTIONAL ASSESSMENT: PAIN_FUNCTIONAL_ASSESSMENT: PREVENTS OR INTERFERES SOME ACTIVE ACTIVITIES AND ADLS

## 2022-08-05 ASSESSMENT — PAIN DESCRIPTION - DESCRIPTORS
DESCRIPTORS: DISCOMFORT
DESCRIPTORS: DISCOMFORT

## 2022-08-05 ASSESSMENT — PAIN DESCRIPTION - LOCATION
LOCATION: BACK
LOCATION: BACK

## 2022-08-05 ASSESSMENT — PAIN SCALES - WONG BAKER: WONGBAKER_NUMERICALRESPONSE: 2

## 2022-08-05 ASSESSMENT — PAIN DESCRIPTION - PAIN TYPE: TYPE: ACUTE PAIN

## 2022-08-05 ASSESSMENT — PAIN DESCRIPTION - ONSET: ONSET: ON-GOING

## 2022-08-05 NOTE — PROGRESS NOTES
Hospitalist Progress Note   Admit Date:  2022  3:41 PM   Name:  Sol Metzger   Age:  65 y.o. Sex:  male  :  1945   MRN:  [id number]   Room:  [facility]/      Reason(s) for Admission: Back pain [M54.9]  General weakness [R53.1]  Acute cystitis without hematuria [N30.00]  Low back pain without sciatica, unspecified back pain laterality, unspecified chronicity [M54.50]     Hospital Course & Interval History:   Sol Metzger is a 65 y.o. male with medical history of ICM with ICD/PPM, EF 30-35% afib on eliquis, DM2, obesity, PAD, anemia, COPD/ asthma, CAD, SHERI on CPAP with recent L2-4 kyphoplasty evaluated with diffuse  bilateral LE weakness. He states this has been worse since his kyphoplasty and he has had several falls. Lives with wife Rita Decker. Seen at ortho spine today and ordered MRI Lspine to evaluate ongoing pain and weakness. Did have negative bone biopsy with kyphoplasty. Felt worse and not better post procedure. Unable to ambulate well, using walker, has trouble getting up from seated position. No LE paresthesia and no urine issues excluding some issues starting stream. Had some loose BM and can't make it to the bathroom in time. No fever. No dysuria. Urine is darker. Has ICD/PPM- will need clearance for MRI. CT Lspine shows \"  Postoperative changes from kyphoplasty procedure at L2 and L4. No lumbar spine   fracture or malalignment aside from the compression deformities. Multiple levels   of probable spinal canal stenosis as described on recent MRI. \"  UA positive. urine culture showing  more than 50k gram-negative rods and pan sensitive. He was treated with Rocephin empirically. CXR with improved infiltrates. COVID negative. MRI brain no acute infarct. MRI lumbar and cervical spine showed epidural, psoas abscess and osteomyelitis. CRP is elevated 12.4. His Eliquis and Plavix were stopped. He was on heparin drip.   Antibiotics were discontinued and IR was consulted for draining the abscess. Blood cultures were negative. ID was consulted and may need to 6 weeks of antibiotics treatment. IR guided drainage of the psoas abscess was performed. Culture results are pending. His Eliquis and Plavix were resumed after the procedure. His heparin drip was discontinued. He was started on vancomycin and ceftriaxone antibiotics. Tomas was placed as he had urinary retention of 500 mL. Neurology was consulted. Pain management with Percocet and morphine. Cardiology was consulted and ICD device was interrogated and functioning well. .  PT,OT recommended STR. Wife Amina Moss [phone], or [phone]   He is a DNI. Subjective/24hr Events (08/05/22): Pt seen and evaluated. Case d/w Wife at the [facility]. Pt is conversant yet with definite cognitive deficits. Pt's wife states that this may have been present prior to illness with memory delay only. Assessment & Plan:     1.) Back pain  Ortho following  Neurology signed off  Pain management with Percocet and morphine  PT,OT recommended STR     2.) Newly diagnosed Epidural and psoas abscess  S/p IR guided drainage of the abscess and 3 ml is removed  Follow IR aspirate cultures  Continue antibiotics Vanco and ceftriaxone   May need long-term therapy with antibiotics for at least 6 weeks  Plan for PICC line   ID is following, appreciate recommendations with abx X 6 weeks. 3.) Acute Osteomyelitis  Anticipate 6 weeks of iv antibiotic therapy      4.) Acute cystitis without hematuria   urine culture showing  more than 50k E coli  and pansensitive   Continue antibiotics    5.) DNI (do not intubate)  Noted and discussed     6.) AMS - prob due to metabolic encephalopathy, Neg Met panel, Recent MRI Brain ok. ? Dementia with acute delirium v met encephalopathy.  Will start low dose seroquel qhs.      7.) Anemia  HGB 9 stable   Stable, chronic range for him  Trend HGB     8.) Hypertension  Most likely due to pain  Diet controlled    9.) Urinary retention  S/p 98 %.  General:    Ill appearing, morbidly obese  Head:  Normocephalic, atraumatic  Eyes:  Sclerae appear normal. Pupils equally round. ENT:  Nares appear normal, no drainage. Moist oral mucosa  Neck:  No restricted ROM. Trachea midline. CV:   RRR. No m/r/g. No jugular venous distension. Lungs:   Diminished. No wheezing, rhonchi, or rales. Respirations even, unlabored. Abdomen: Bowel sounds present. Soft, nontender, nondistended. Extremities: No cyanosis or clubbing. No edema. Tenderness of the back is present. Skin:     No rashes and normal coloration. Warm and dry. Neuro:  CN II-XII grossly intact. Sensation intact. A&Ox3  Psych:  Normal mood and affect.       I have reviewed ordered lab tests and independently visualized imaging below:    Recent Labs:  Recent Results (from the past 48 hour(s))   POCT Glucose    Collection Time: 08/03/22  8:50 PM   Result Value Ref Range    POC Glucose 180 (H) 65 - 100 mg/dL    Performed by: Mariann    POCT Glucose    Collection Time: 08/04/22  8:04 AM   Result Value Ref Range    POC Glucose 166 (H) 65 - 100 mg/dL    Performed by: Be Hayden    CBC with Auto Differential    Collection Time: 08/04/22  8:30 AM   Result Value Ref Range    WBC 9.9 4.3 - 11.1 K/uL    RBC 2.95 (L) 4.23 - 5.6 M/uL    Hemoglobin 9.3 (L) 13.6 - 17.2 g/dL    Hematocrit 28.2 (L) 41.1 - 50.3 %    MCV 95.6 79.6 - 97.8 FL    MCH 31.5 26.1 - 32.9 PG    MCHC 33.0 31.4 - 35.0 g/dL    RDW 14.9 (H) 11.9 - 14.6 %    Platelets 999 933 - 651 K/uL    MPV 10.0 9.4 - 12.3 FL    nRBC 0.00 0.0 - 0.2 K/uL    Differential Type AUTOMATED      Seg Neutrophils 82 (H) 43 - 78 %    Lymphocytes 7 (L) 13 - 44 %    Monocytes 9 4.0 - 12.0 %    Eosinophils % 0 (L) 0.5 - 7.8 %    Basophils 0 0.0 - 2.0 %    Immature Granulocytes 2 0.0 - 5.0 %    Segs Absolute 8.1 1.7 - 8.2 K/UL    Absolute Lymph # 0.7 0.5 - 4.6 K/UL    Absolute Mono # 0.9 0.1 - 1.3 K/UL    Absolute Eos # 0.0 0.0 - 0.8 K/UL    Basophils Absolute 0.0 0.0 - 0.2 K/UL    Absolute Immature Granulocyte 0.2 0.0 - 0.5 K/UL   Comprehensive Metabolic Panel w/ Reflex to MG    Collection Time: 08/04/22  8:30 AM   Result Value Ref Range    Sodium 131 (L) 138 - 145 mmol/L    Potassium 4.3 3.5 - 5.1 mmol/L    Chloride 93 (L) 98 - 107 mmol/L    CO2 31 21 - 32 mmol/L    Anion Gap 7 7 - 16 mmol/L    Glucose 177 (H) 65 - 100 mg/dL    BUN 8 8 - 23 MG/DL    Creatinine 0.70 (L) 0.8 - 1.5 MG/DL    GFR African American >60 >60 ml/min/1.73m2    GFR Non- >60 >60 ml/min/1.73m2    Calcium 9.0 8.3 - 10.4 MG/DL    Total Bilirubin 1.1 0.2 - 1.1 MG/DL     (H) 12 - 65 U/L    AST 26 15 - 37 U/L    Alk Phosphatase 153 (H) 50 - 136 U/L    Total Protein 6.8 6.3 - 8.2 g/dL    Albumin 2.4 (L) 3.2 - 4.6 g/dL    Globulin 4.4 (H) 2.3 - 3.5 g/dL    Albumin/Globulin Ratio 0.5 (L) 1.2 - 3.5     Phosphorus    Collection Time: 08/04/22  8:30 AM   Result Value Ref Range    Phosphorus 3.7 2.3 - 3.7 MG/DL   Ammonia    Collection Time: 08/04/22  8:30 AM   Result Value Ref Range    Ammonia 10 (L) 11 - 32 UMOL/L   Vitamin B12    Collection Time: 08/04/22  8:30 AM   Result Value Ref Range    Vitamin B-12 1490 (H) 193 - 986 pg/mL   TSH    Collection Time: 08/04/22  8:30 AM   Result Value Ref Range    TSH, 3RD GENERATION 1.500 0.358 - 3.740 uIU/mL   RPR    Collection Time: 08/04/22  8:30 AM   Result Value Ref Range    RPR NONREACTIVE NR     POCT Glucose    Collection Time: 08/04/22 11:36 AM   Result Value Ref Range    POC Glucose 234 (H) 65 - 100 mg/dL    Performed by: Carissa Tavera    POCT Glucose    Collection Time: 08/04/22  4:06 PM   Result Value Ref Range    POC Glucose 194 (H) 65 - 100 mg/dL    Performed by: Miguel    POC Blood, Cord, Arterial    Collection Time: 08/04/22  5:49 PM   Result Value Ref Range    DEVICE NASAL CANNULA      pH, Arterial, POC 7.47 (H) 7.35 - 7.45      pCO2, Arterial, POC 37.1 35 - 45 MMHG    pO2, Arterial, POC 92 75 - 100 MMHG    HCO3, Mixed 27.0 (H) 22 - 26 MMOL/L    SO2c, Arterial, POC 97.7 95 - 98 %    Base Excess 3.4 mmol/L    POC Bill's Test Positive      Site LEFT RADIAL      Specimen type: ARTERIAL      Performed by: Prema Bush    POCT Glucose    Collection Time: 08/04/22  9:30 PM   Result Value Ref Range    POC Glucose 193 (H) 65 - 100 mg/dL    Performed by: Edmond    POCT Glucose    Collection Time: 08/05/22  7:31 AM   Result Value Ref Range    POC Glucose 173 (H) 65 - 100 mg/dL    Performed by: Miguel    POCT Glucose    Collection Time: 08/05/22 11:05 AM   Result Value Ref Range    POC Glucose 198 (H) 65 - 100 mg/dL    Performed by: Miguel    POCT Glucose    Collection Time: 08/05/22  4:13 PM   Result Value Ref Range    POC Glucose 226 (H) 65 - 100 mg/dL    Performed by: Miguel          Other Studies:  XR LUMBAR SPINE (2-3 VIEWS)    Result Date: 7/27/2022  AUTOMATIC ADMINISTRATIVE RESULT The result for this exam can be found in the Progress note in the chart. See Progress note in the chart. CT LUMBAR SPINE WO CONTRAST    Result Date: 7/27/2022  EXAMINATION: CT LUMBAR SPINE WO CONTRAST 7/27/2022 6:21 PM ACCESSION NUMBER: [id number] COMPARISON: MRI lumbar spine 06/02/2022. INDICATION: Recent kyphoplasty with fall. TECHNIQUE: Multiple-row detector helical CT examination of the lumbar spine was performed without intravenous contrast. Multiplanar reformats were provided. Radiation dose reduction techniques were used for this study. Our CT scanners use one or all of the following: Automated exposure control, adjustment of the mA and/or kV according to patient size, iterative reconstruction. FINDINGS: Patient is status post interval kyphoplasty procedure at L2 and L4 when compared to prior MRI. Mild compression fractures are noted at these levels. No retropulsed fragments noted in the spinal canal. Multilevel degenerative disc changes are present. No malalignment.  Areas of spinal canal stenosis suspected as described on recent prior MRI. Postoperative changes from kyphoplasty procedure at L2 and L4. No lumbar spine fracture or malalignment aside from the compression deformities. Multiple levels of probable spinal canal stenosis as described on recent MRI. XR CHEST PORTABLE    Result Date: 7/27/2022  XR CHEST PORTABLE 7/27/2022 6:35 PM HISTORY: Weakness. COMPARISON: Chest x-ray 3/7/2022. A portable AP view of the chest was obtained. Pulmonary infiltrate predominantly in the right upper lobe and mid to lower left lung has mostly cleared. Atelectasis or scarring right lung base appears stable. Stable cardiomegaly. Implantable cardiac device left chest and both leads are unchanged. No pneumothorax. No pleural effusions.        Current Meds:  Current Facility-Administered Medications   Medication Dose Route Frequency    bisacodyl (DULCOLAX) suppository 10 mg  10 mg Rectal Daily PRN    morphine injection 2 mg  2 mg IntraVENous Q4H PRN    lidocaine 4 % external patch 1 patch  1 patch TransDERmal Daily    amLODIPine (NORVASC) tablet 5 mg  5 mg Oral Daily    cefTRIAXone (ROCEPHIN) 2,000 mg in sodium chloride 0.9 % 50 mL IVPB mini-bag  2,000 mg IntraVENous Q24H    oxyCODONE-acetaminophen (PERCOCET) 5-325 MG per tablet 1 tablet  1 tablet Oral Q4H PRN    sodium chloride flush 0.9 % injection 5-40 mL  5-40 mL IntraVENous 2 times per day    sodium chloride flush 0.9 % injection 5-40 mL  5-40 mL IntraVENous PRN    0.9 % sodium chloride infusion  25 mL IntraVENous PRN    vancomycin (VANCOCIN) 1250 mg in sodium chloride 0.9% 250 mL IVPB  1,250 mg IntraVENous Q12H    heparin (porcine) injection 2,000 Units  2,000 Units IntraVENous Once    morphine (MS CONTIN) extended release tablet 15 mg  15 mg Oral 2 times per day    amiodarone (CORDARONE) tablet 200 mg  200 mg Oral Daily    tamsulosin (FLOMAX) capsule 0.4 mg  0.4 mg Oral Daily    albuterol sulfate HFA (PROVENTIL;VENTOLIN;PROAIR) 108 (90 Base) MCG/ACT inhaler 2 puff  2 puff Inhalation Q4H PRN    apixaban (ELIQUIS) tablet 5 mg  5 mg Oral 2 times per day    calcitonin (MIACALCIN) nasal spray 1 spray  1 spray Alternating Nares Daily    carvedilol (COREG) tablet 25 mg  25 mg Oral BID WC    clopidogrel (PLAVIX) tablet 75 mg  75 mg Oral Daily    fluticasone (FLONASE) 50 MCG/ACT nasal spray 2 spray  2 spray Each Nostril Daily    mometasone-formoterol (DULERA) 200-5 MCG/ACT inhaler 2 puff  2 puff Inhalation BID    furosemide (LASIX) tablet 20 mg  20 mg Oral Daily    latanoprost (XALATAN) 0.005 % ophthalmic solution 1 drop  1 drop Ophthalmic Daily    montelukast (SINGULAIR) tablet 10 mg  10 mg Oral Nightly    [Held by provider] rosuvastatin (CRESTOR) tablet 10 mg  10 mg Oral Daily    valsartan (DIOVAN) tablet 320 mg  320 mg Oral Daily    sodium chloride flush 0.9 % injection 5-40 mL  5-40 mL IntraVENous 2 times per day    sodium chloride flush 0.9 % injection 5-40 mL  5-40 mL IntraVENous PRN    0.9 % sodium chloride infusion   IntraVENous PRN    ondansetron (ZOFRAN-ODT) disintegrating tablet 4 mg  4 mg Oral Q8H PRN    Or    ondansetron (ZOFRAN) injection 4 mg  4 mg IntraVENous Q6H PRN    polyethylene glycol (GLYCOLAX) packet 17 g  17 g Oral Daily PRN    acetaminophen (TYLENOL) tablet 650 mg  650 mg Oral Q6H PRN    Or    acetaminophen (TYLENOL) suppository 650 mg  650 mg Rectal Q6H PRN    glucose chewable tablet 16 g  4 tablet Oral PRN    dextrose bolus 10% 125 mL  125 mL IntraVENous PRN    Or    dextrose bolus 10% 250 mL  250 mL IntraVENous PRN    glucagon (rDNA) injection 1 mg  1 mg SubCUTAneous PRN    dextrose 10 % infusion   IntraVENous Continuous PRN    insulin lispro (HUMALOG) injection vial 0-4 Units  0-4 Units SubCUTAneous TID WC    insulin lispro (HUMALOG) injection vial 0-4 Units  0-4 Units SubCUTAneous Nightly       Signed:      Remy Rodriguez.  Gabriel Clarke MD, [address]  Internal Medicine - Hospitalist    Part of this note may have been written by using a voice dictation software. The note has been proof read but may still contain some grammatical/other typographical errors.

## 2022-08-05 NOTE — PROGRESS NOTES
Physical/Occupational Therapy Note:    Attempted to see patient this AM for physical therapy treatment  session. Patient presents in bed upon entering room and is very drowsy. PT/OT attempted to get him OOB and he was unable to stay awake for long enough to perform bed mobility safely much less a standing transfer. Will follow and re-attempt as schedule permits/patient available.  Thank you,    Rodney Mccann 88

## 2022-08-05 NOTE — PROGRESS NOTES
Physician Progress Note      PATIENT:               Janessa Fitzpatrick  [facility] #:                  [id number]  :                       1945  ADMIT DATE:       2022 3:41 PM  [address] [place] DATE:  RESPONDING  PROVIDER #:        Maegan Correa MD          QUERY TEXT:    Patient admitted with osteomyelitis of vertebra, lumbar region, noted to have   paroxysmal atrial fibrillation and is maintained on Eliquis. If possible,   please document in progress notes and discharge summary if you are evaluating   and/or treating any of the following: The medical record reflects the following:  Risk Factors: Age > 72 history of DM, HTN, a-fib  Clinical Indicators: Age 65, , /60  Treatment: Eliquis 5 mg twice a day    Email: [email or web address]  Options provided:  -- Secondary hypercoagulable state in a patient with atrial fibrillation  -- Other - I will add my own diagnosis  -- Disagree - Not applicable / Not valid  -- Disagree - Clinically unable to determine / Unknown  -- Refer to Clinical Documentation Reviewer    PROVIDER RESPONSE TEXT:    Provider disagreed with this query.     Query created by: Solis Alvarado on 2022 2:15 PM      Electronically signed by:  Maegan Correa MD 2022 2:24 PM

## 2022-08-05 NOTE — PROGRESS NOTES
END OF SHIFT SUMMARY:    Additional events this shift:   - Pt was confused and restless this morning with some fidgeting. Restlessness and confusion decreased over the day  - Lidocaine patch applied per orders  - Pt got up to chair with 4 person assist  - Pt refused pain medication  - Pt resting, bed alarm on, hourly checks by the nurse or patient care tech    I/Os:    08/05 0701 - 08/05 1900  In: 1897 [P.O.:472;  I.V.:1425]  Out: [address] [place]

## 2022-08-05 NOTE — PROGRESS NOTES
[facility]    Patient not seen, chart reviewed. Discussed with attending.    -all cultures remains negative/final  -ID plan as recommended yesterday:  Continue Vancomycin (aim trough 15-20), current dose 1250mg Q12  Continue Ceftriaxone 2g IV Q24hrs  6 weeks EOT  9/9/22.    SNF planned, recommend weekly CBC with diff, LFTs, CRP and Bi-weekly VT, Cr  ID follow up 9/9/22 @ 10:40am    ID will sign off, please call with questions or concerns    -MOSES Jorge

## 2022-08-05 NOTE — CARE COORDINATION
Pt discussed during IDR. Pt having increased confusion as well as worsening back pain. Pt was to d/c to [facility] for STR, however pt is not medically stable for d/c. Dr. Adenike Willis requested that CM make a referral today to [facility]. Referral submitted and per the LTAC liaison pt will likely be d/c from this floor and admitted to [place] this weekend. CM updated Dr. Adenike Willis via [employer]. No other d/c needs identified at this time. CM will continue to follow.

## 2022-08-06 LAB
GLUCOSE BLD STRIP.AUTO-MCNC: 183 MG/DL (ref 65–100)
GLUCOSE BLD STRIP.AUTO-MCNC: 195 MG/DL (ref 65–100)
GLUCOSE BLD STRIP.AUTO-MCNC: 198 MG/DL (ref 65–100)
GLUCOSE BLD STRIP.AUTO-MCNC: 200 MG/DL (ref 65–100)
SERVICE CMNT-IMP: ABNORMAL

## 2022-08-06 PROCEDURE — 6370000000 HC RX 637 (ALT 250 FOR IP): Performed by: NURSE PRACTITIONER

## 2022-08-06 PROCEDURE — 6370000000 HC RX 637 (ALT 250 FOR IP): Performed by: INTERNAL MEDICINE

## 2022-08-06 PROCEDURE — 94640 AIRWAY INHALATION TREATMENT: CPT

## 2022-08-06 PROCEDURE — 1100000000 HC RM PRIVATE

## 2022-08-06 PROCEDURE — 2580000003 HC RX 258: Performed by: INTERNAL MEDICINE

## 2022-08-06 PROCEDURE — 82962 GLUCOSE BLOOD TEST: CPT

## 2022-08-06 PROCEDURE — 94760 N-INVAS EAR/PLS OXIMETRY 1: CPT

## 2022-08-06 PROCEDURE — 6370000000 HC RX 637 (ALT 250 FOR IP): Performed by: STUDENT IN AN ORGANIZED HEALTH CARE EDUCATION/TRAINING PROGRAM

## 2022-08-06 PROCEDURE — 2700000000 HC OXYGEN THERAPY PER DAY

## 2022-08-06 PROCEDURE — 94660 CPAP INITIATION&MGMT: CPT

## 2022-08-06 PROCEDURE — 6360000002 HC RX W HCPCS: Performed by: INTERNAL MEDICINE

## 2022-08-06 PROCEDURE — 2580000003 HC RX 258: Performed by: NURSE PRACTITIONER

## 2022-08-06 PROCEDURE — 6360000002 HC RX W HCPCS: Performed by: NURSE PRACTITIONER

## 2022-08-06 RX ADMIN — TAMSULOSIN HYDROCHLORIDE 0.4 MG: 0.4 CAPSULE ORAL at 08:02

## 2022-08-06 RX ADMIN — MORPHINE SULFATE 15 MG: 15 TABLET, FILM COATED, EXTENDED RELEASE ORAL at 08:02

## 2022-08-06 RX ADMIN — APIXABAN 5 MG: 5 TABLET, FILM COATED ORAL at 21:04

## 2022-08-06 RX ADMIN — MONTELUKAST 10 MG: 10 TABLET, FILM COATED ORAL at 21:04

## 2022-08-06 RX ADMIN — VALSARTAN 320 MG: 320 TABLET, FILM COATED ORAL at 08:02

## 2022-08-06 RX ADMIN — CALCITONIN SALMON 1 SPRAY: 200 SPRAY, METERED NASAL at 08:20

## 2022-08-06 RX ADMIN — ACETAMINOPHEN 650 MG: 325 TABLET ORAL at 17:42

## 2022-08-06 RX ADMIN — CARVEDILOL 25 MG: 25 TABLET, FILM COATED ORAL at 17:40

## 2022-08-06 RX ADMIN — BISACODYL 10 MG: 10 SUPPOSITORY RECTAL at 11:46

## 2022-08-06 RX ADMIN — MOMETASONE FUROATE AND FORMOTEROL FUMARATE DIHYDRATE 2 PUFF: 200; 5 AEROSOL RESPIRATORY (INHALATION) at 20:39

## 2022-08-06 RX ADMIN — CLOPIDOGREL BISULFATE 75 MG: 75 TABLET ORAL at 08:02

## 2022-08-06 RX ADMIN — SODIUM CHLORIDE, PRESERVATIVE FREE 10 ML: 5 INJECTION INTRAVENOUS at 21:07

## 2022-08-06 RX ADMIN — CEFTRIAXONE 2000 MG: 2 INJECTION, POWDER, FOR SOLUTION INTRAMUSCULAR; INTRAVENOUS at 07:58

## 2022-08-06 RX ADMIN — AMLODIPINE BESYLATE 5 MG: 5 TABLET ORAL at 08:02

## 2022-08-06 RX ADMIN — SODIUM CHLORIDE, PRESERVATIVE FREE 10 ML: 5 INJECTION INTRAVENOUS at 08:04

## 2022-08-06 RX ADMIN — LATANOPROST 1 DROP: 50 SOLUTION OPHTHALMIC at 08:04

## 2022-08-06 RX ADMIN — Medication 1250 MG: at 21:57

## 2022-08-06 RX ADMIN — CARVEDILOL 25 MG: 25 TABLET, FILM COATED ORAL at 08:02

## 2022-08-06 RX ADMIN — AMIODARONE HYDROCHLORIDE 200 MG: 200 TABLET ORAL at 08:02

## 2022-08-06 RX ADMIN — Medication 1250 MG: at 11:45

## 2022-08-06 RX ADMIN — FLUTICASONE PROPIONATE 2 SPRAY: 50 SPRAY, METERED NASAL at 08:03

## 2022-08-06 RX ADMIN — QUETIAPINE FUMARATE 25 MG: 25 TABLET ORAL at 21:04

## 2022-08-06 RX ADMIN — SODIUM CHLORIDE, PRESERVATIVE FREE 10 ML: 5 INJECTION INTRAVENOUS at 21:06

## 2022-08-06 RX ADMIN — INSULIN LISPRO 1 UNITS: 100 INJECTION, SOLUTION INTRAVENOUS; SUBCUTANEOUS at 08:16

## 2022-08-06 RX ADMIN — ROSUVASTATIN CALCIUM 10 MG: 10 TABLET, FILM COATED ORAL at 08:02

## 2022-08-06 RX ADMIN — FUROSEMIDE 20 MG: 20 TABLET ORAL at 08:02

## 2022-08-06 RX ADMIN — APIXABAN 5 MG: 5 TABLET, FILM COATED ORAL at 08:02

## 2022-08-06 ASSESSMENT — PAIN SCALES - GENERAL
PAINLEVEL_OUTOF10: 5
PAINLEVEL_OUTOF10: 0
PAINLEVEL_OUTOF10: 3
PAINLEVEL_OUTOF10: 0

## 2022-08-06 ASSESSMENT — PAIN DESCRIPTION - DESCRIPTORS: DESCRIPTORS: ACHING

## 2022-08-06 ASSESSMENT — PAIN DESCRIPTION - LOCATION: LOCATION: BACK

## 2022-08-06 ASSESSMENT — PAIN DESCRIPTION - ORIENTATION: ORIENTATION: LOWER

## 2022-08-06 NOTE — PROGRESS NOTES
END OF SHIFT SUMMARY:    Significant vitals this shift:  0  Significant labs this shift:  0  Tests performed this shift:  0  Orders to be followed up on:  0  Blood products given this shift:  0  Additional events this shift:   MS Contin 15 mg scheduled was dc/d. Tylenol 650 mg po given @1742. Dulcolax supp given @1146 for constipation. Only small smear BM post suppository. Pt has not had a BM in 8 days. I/Os:  +/- this shift:   08/06 0701 - 08/06 1900  In: 575 [P.O.:225;  I.V.:350]  Out: - 2 wet briefs  Occurrences this Shift:  Urine 2, BM 0, Emesis 0    Sangita Herrera RN

## 2022-08-06 NOTE — PROGRESS NOTES
VANCO DAILY FOLLOW UP NOTE  [facility] Pharmacokinetic Monitoring Service - Vancomycin    Consulting Provider: Jose Rob   Indication: L2-L4 osteodiscitis and psoas abscess  Target Concentration: Goal AUC/GIOVANNY 400-600 mg*hr/L  Day of Therapy: anticipate 6 weeks per ID  Additional Antimicrobials: ceftriaxone    Pertinent Laboratory Values: Wt Readings from Last 1 Encounters:   08/01/22 259 lb 14 oz (117.9 kg)     Temp Readings from Last 1 Encounters:   08/06/22 98.7 °F (37.1 °C) (Axillary)     Recent Labs     08/04/22  0830   BUN 8   CREATININE 0.70*   WBC 9.9     Estimated Creatinine Clearance: 117 mL/min (A) (based on SCr of 0.7 mg/dL (L)). Lab Results   Component Value Date/Time    VANCORANDOM 15.2 08/03/2022 06:02 AM       MRSA Nasal Swab: N/A. Non-respiratory infection. .      Assessment:  Date/Time Dose Concentration AUC   8/3 0602 1250 mg q12h 15.2 493   Note: Serum concentrations collected for AUC dosing may appear elevated if collected in close proximity to the dose administered, this is not necessarily an indication of toxicity    Plan:  Current dosing regimen is therapeutic.   Continue 1250 mg q12h  Repeat vancomycin concentrations will be ordered as clinically appropriate   Pharmacy will continue to monitor patient and adjust therapy as indicated    Thank you for the consult,  Denilson Macedo

## 2022-08-06 NOTE — PROGRESS NOTES
Hospitalist Progress Note   Admit Date:  2022  3:41 PM   Name:  Mini Oliveira   Age:  65 y.o. Sex:  male  :  1945   MRN:  [id number]   Room:  [facility] [place]      Reason(s) for Admission: Back pain [M54.9]  General weakness [R53.1]  Acute cystitis without hematuria [N30.00]  Low back pain without sciatica, unspecified back pain laterality, unspecified chronicity [M54.50]     Hospital Course & Interval History:   Mini Oliveira is a 65 y.o. male with medical history of ICM with ICD/PPM, EF 30-35% afib on eliquis, DM2, obesity, PAD, anemia, COPD/ asthma, CAD, SHERI on CPAP with recent L2-4 kyphoplasty evaluated with diffuse  bilateral LE weakness. He states this has been worse since his kyphoplasty and he has had several falls. Lives with wife Dwayne Matson. Seen at ortho spine today and ordered MRI Lspine to evaluate ongoing pain and weakness. Did have negative bone biopsy with kyphoplasty. Felt worse and not better post procedure. Unable to ambulate well, using walker, has trouble getting up from seated position. No LE paresthesia and no urine issues excluding some issues starting stream. Had some loose BM and can't make it to the bathroom in time. No fever. No dysuria. Urine is darker. Has ICD/PPM- will need clearance for MRI. CT Lspine shows \"  Postoperative changes from kyphoplasty procedure at L2 and L4. No lumbar spine   fracture or malalignment aside from the compression deformities. Multiple levels   of probable spinal canal stenosis as described on recent MRI. \"  UA positive. urine culture showing  more than 50k gram-negative rods and pan sensitive. He was treated with Rocephin empirically. CXR with improved infiltrates. COVID negative. MRI brain no acute infarct. MRI lumbar and cervical spine showed epidural, psoas abscess and osteomyelitis. CRP is elevated 12.4. His Eliquis and Plavix were stopped. He was on heparin drip.   Antibiotics were discontinued and IR was consulted for draining the abscess. Blood cultures were negative. ID was consulted and may need to 6 weeks of antibiotics treatment. IR guided drainage of the psoas abscess was performed. Culture results are pending. His Eliquis and Plavix were resumed after the procedure. His heparin drip was discontinued. He was started on vancomycin and ceftriaxone antibiotics. Tomas was placed as he had urinary retention of 500 mL. Neurology was consulted. Pain management with Percocet and morphine. Cardiology was consulted and ICD device was interrogated and functioning well. .  PT,OT recommended STR. Wife Siria Tolentino [phone], or [phone]   He is a DNI. Subjective/24hr Events (08/06/22): Pt seen and evaluated. Case d/w . No [facility] at the [facility]. Pt knows he is in the hospital but confused. Assessment & Plan:     1.) Back pain  Ortho following  Neurology signed off  Pain management with Percocet and morphine  PT,OT recommended STR     2.) Newly diagnosed Epidural and psoas abscess  S/p IR guided drainage of the abscess and 3 ml is removed  Follow IR aspirate cultures  Continue antibiotics Vanco and ceftriaxone   May need long-term therapy with antibiotics for at least 6 weeks  Plan for PICC line   ID is following, appreciate recommendations with abx X 6 weeks. LORI eval for [facility] referral     3.) Acute Osteomyelitis  Anticipate 6 weeks of iv antibiotic therapy      4.) Acute cystitis without hematuria   urine culture showing  more than 50k E coli  and pansensitive   Continue antibiotics    5.) DNI (do not intubate)  Noted and discussed     6.) AMS - prob due to metabolic encephalopathy, Neg Met panel, Recent MRI Brain ok. ? Dementia with acute delirium v met encephalopathy. Will cont low dose seroquel qhs. Prob related to pain meds and oxy. DC this and f/u met panel.      7.) Anemia  HGB 9 stable   Stable, chronic range for him  Trend HGB     8.) Hypertension  Most likely due to pain  Diet controlled    9.) Urinary retention  S/p 7/27/2022 Yes    DNI (do not intubate) (Chronic) 7/27/2022 Yes    UTI (urinary tract infection) 7/27/2022 Yes    Elevated liver enzymes 7/27/2022 Yes    Anemia (Chronic) 7/27/2022 Yes    Hyponatremia 7/27/2022 Yes    Atrial fibrillation ([facility] 7/27/2022 Yes    Severe obesity (BMI 35.0-39. 9) with comorbidity ([facility] 7/27/2022 Yes    COPD (chronic obstructive pulmonary disease) ([facility] 7/27/2022 Yes    Intermittent spinal claudication ([facility] 7/27/2022 Yes    Ischemic cardiomyopathy 7/27/2022 Yes    Ventricular tachycardia ([facility] 7/27/2022 Yes    Coronary artery disease involving native coronary artery of native heart without angina pectoris 7/27/2022 Yes    PAD (peripheral artery disease) ([facility] 7/27/2022 Yes    Asthma 7/27/2022 Yes    DM (diabetes mellitus) type II, controlled, with peripheral vascular disorder ([facility] 7/27/2022 Yes    Obstructive sleep apnea 7/27/2022 Yes   Objective:   Patient Vitals for the past 24 hrs:   Temp Pulse Resp BP SpO2   08/06/22 1032 99.3 °F (37.4 °C) 60 20 (!) 132/91 91 %   08/06/22 0800 -- 64 18 -- 98 %   08/06/22 0713 98.7 °F (37.1 °C) 66 20 (!) 139/90 93 %   08/06/22 0336 97.8 °F (36.6 °C) 84 19 (!) 151/67 94 %   08/06/22 0300 -- -- -- -- 93 %   08/05/22 2256 98.4 °F (36.9 °C) 62 19 (!) 146/76 92 %   08/05/22 2255 -- -- -- -- 96 %   08/05/22 2132 -- -- 18 -- --   08/05/22 2014 -- 67 18 -- 97 %   08/05/22 1932 98.3 °F (36.8 °C) 63 17 (!) 145/80 96 %   08/05/22 1555 98.4 °F (36.9 °C) 61 18 (!) 121/59 98 %         Estimated body mass index is 34.29 kg/m² as calculated from the following:    Height as of this encounter: 6' 1\" (1.854 m). Weight as of this encounter: 259 lb 14 oz (117.9 kg). Intake/Output Summary (Last 24 hours) at 8/6/2022 1443  Last data filed at 8/5/2022 1834  Gross per 24 hour   Intake 1543 ml   Output --   Net 1543 ml           Physical Exam:     Blood pressure (!) 132/91, pulse 60, temperature 99.3 °F (37.4 °C), temperature source Axillary, resp.  rate 20, height 6' 1\" (1.854 m), weight 259 lb 14 oz (117.9 kg), SpO2 91 %. General:    Ill appearing, morbidly obese  Head:  Normocephalic, atraumatic  Eyes:  Sclerae appear normal. Pupils equally round. ENT:  Nares appear normal, no drainage. Moist oral mucosa  Neck:  No restricted ROM. Trachea midline. CV:   RRR. No m/r/g. No jugular venous distension. Lungs:   Diminished. No wheezing, rhonchi, or rales. Respirations even, unlabored. Abdomen: Bowel sounds present. Soft, nontender, nondistended. Extremities: No cyanosis or clubbing. No edema. Tenderness of the back is present. Skin:     No rashes and normal coloration. Warm and dry. Neuro:  CN II-XII grossly intact. Sensation intact. A&Ox3  Psych:  Normal mood and affect.       I have reviewed ordered lab tests and independently visualized imaging below:    Recent Labs:  Recent Results (from the past 48 hour(s))   POCT Glucose    Collection Time: 08/04/22  4:06 PM   Result Value Ref Range    POC Glucose 194 (H) 65 - 100 mg/dL    Performed by: Miguel    POC Blood, Cord, Arterial    Collection Time: 08/04/22  5:49 PM   Result Value Ref Range    DEVICE NASAL CANNULA      pH, Arterial, POC 7.47 (H) 7.35 - 7.45      pCO2, Arterial, POC 37.1 35 - 45 MMHG    pO2, Arterial, POC 92 75 - 100 MMHG    HCO3, Mixed 27.0 (H) 22 - 26 MMOL/L    SO2c, Arterial, POC 97.7 95 - 98 %    Base Excess 3.4 mmol/L    POC Bill's Test Positive      Site LEFT RADIAL      Specimen type: ARTERIAL      Performed by: Allison Ahn    POCT Glucose    Collection Time: 08/04/22  9:30 PM   Result Value Ref Range    POC Glucose 193 (H) 65 - 100 mg/dL    Performed by: Edmond    POCT Glucose    Collection Time: 08/05/22  7:31 AM   Result Value Ref Range    POC Glucose 173 (H) 65 - 100 mg/dL    Performed by: Miguel    POCT Glucose    Collection Time: 08/05/22 11:05 AM   Result Value Ref Range    POC Glucose 198 (H) 65 - 100 mg/dL    Performed by: Miguel    POCT Glucose    Collection Time: 08/05/22  4:13 PM   Result Value Ref Range    POC Glucose 226 (H) 65 - 100 mg/dL    Performed by: Miguel    POCT Glucose    Collection Time: 08/05/22  9:04 PM   Result Value Ref Range    POC Glucose 182 (H) 65 - 100 mg/dL    Performed by: Evin    POCT Glucose    Collection Time: 08/06/22  7:52 AM   Result Value Ref Range    POC Glucose 200 (H) 65 - 100 mg/dL    Performed by: Agapito    POCT Glucose    Collection Time: 08/06/22 11:24 AM   Result Value Ref Range    POC Glucose 183 (H) 65 - 100 mg/dL    Performed by: Danitza Keita          Other Studies:  XR LUMBAR SPINE (2-3 VIEWS)    Result Date: 7/27/2022  AUTOMATIC ADMINISTRATIVE RESULT The result for this exam can be found in the Progress note in the chart. See Progress note in the chart. CT LUMBAR SPINE WO CONTRAST    Result Date: 7/27/2022  EXAMINATION: CT LUMBAR SPINE WO CONTRAST 7/27/2022 6:21 PM ACCESSION NUMBER: [id number] COMPARISON: MRI lumbar spine 06/02/2022. INDICATION: Recent kyphoplasty with fall. TECHNIQUE: Multiple-row detector helical CT examination of the lumbar spine was performed without intravenous contrast. Multiplanar reformats were provided. Radiation dose reduction techniques were used for this study. Our CT scanners use one or all of the following: Automated exposure control, adjustment of the mA and/or kV according to patient size, iterative reconstruction. FINDINGS: Patient is status post interval kyphoplasty procedure at L2 and L4 when compared to prior MRI. Mild compression fractures are noted at these levels. No retropulsed fragments noted in the spinal canal. Multilevel degenerative disc changes are present. No malalignment. Areas of spinal canal stenosis suspected as described on recent prior MRI. Postoperative changes from kyphoplasty procedure at L2 and L4.  No lumbar spine fracture or malalignment aside from the compression deformities. Multiple levels of probable spinal canal stenosis as described on recent MRI. XR CHEST PORTABLE    Result Date: 7/27/2022  XR CHEST PORTABLE 7/27/2022 6:35 PM HISTORY: Weakness. COMPARISON: Chest x-ray 3/7/2022. A portable AP view of the chest was obtained. Pulmonary infiltrate predominantly in the right upper lobe and mid to lower left lung has mostly cleared. Atelectasis or scarring right lung base appears stable. Stable cardiomegaly. Implantable cardiac device left chest and both leads are unchanged. No pneumothorax. No pleural effusions.        Current Meds:  Current Facility-Administered Medications   Medication Dose Route Frequency    QUEtiapine (SEROQUEL) tablet 25 mg  25 mg Oral Nightly    traMADol (ULTRAM) tablet 50 mg  50 mg Oral Q6H PRN    bisacodyl (DULCOLAX) suppository 10 mg  10 mg Rectal Daily PRN    morphine injection 2 mg  2 mg IntraVENous Q4H PRN    lidocaine 4 % external patch 1 patch  1 patch TransDERmal Daily    amLODIPine (NORVASC) tablet 5 mg  5 mg Oral Daily    cefTRIAXone (ROCEPHIN) 2,000 mg in sodium chloride 0.9 % 50 mL IVPB mini-bag  2,000 mg IntraVENous Q24H    sodium chloride flush 0.9 % injection 5-40 mL  5-40 mL IntraVENous 2 times per day    sodium chloride flush 0.9 % injection 5-40 mL  5-40 mL IntraVENous PRN    0.9 % sodium chloride infusion  25 mL IntraVENous PRN    vancomycin (VANCOCIN) 1250 mg in sodium chloride 0.9% 250 mL IVPB  1,250 mg IntraVENous Q12H    heparin (porcine) injection 2,000 Units  2,000 Units IntraVENous Once    amiodarone (CORDARONE) tablet 200 mg  200 mg Oral Daily    tamsulosin (FLOMAX) capsule 0.4 mg  0.4 mg Oral Daily    albuterol sulfate HFA (PROVENTIL;VENTOLIN;PROAIR) 108 (90 Base) MCG/ACT inhaler 2 puff  2 puff Inhalation Q4H PRN    apixaban (ELIQUIS) tablet 5 mg  5 mg Oral 2 times per day    calcitonin (MIACALCIN) nasal spray 1 spray  1 spray Alternating Nares Daily    carvedilol (COREG) tablet 25 mg  25 mg Oral BID WC clopidogrel (PLAVIX) tablet 75 mg  75 mg Oral Daily    fluticasone (FLONASE) 50 MCG/ACT nasal spray 2 spray  2 spray Each Nostril Daily    mometasone-formoterol (DULERA) 200-5 MCG/ACT inhaler 2 puff  2 puff Inhalation BID    furosemide (LASIX) tablet 20 mg  20 mg Oral Daily    latanoprost (XALATAN) 0.005 % ophthalmic solution 1 drop  1 drop Ophthalmic Daily    montelukast (SINGULAIR) tablet 10 mg  10 mg Oral Nightly    rosuvastatin (CRESTOR) tablet 10 mg  10 mg Oral Daily    valsartan (DIOVAN) tablet 320 mg  320 mg Oral Daily    sodium chloride flush 0.9 % injection 5-40 mL  5-40 mL IntraVENous 2 times per day    sodium chloride flush 0.9 % injection 5-40 mL  5-40 mL IntraVENous PRN    0.9 % sodium chloride infusion   IntraVENous PRN    ondansetron (ZOFRAN-ODT) disintegrating tablet 4 mg  4 mg Oral Q8H PRN    Or    ondansetron (ZOFRAN) injection 4 mg  4 mg IntraVENous Q6H PRN    polyethylene glycol (GLYCOLAX) packet 17 g  17 g Oral Daily PRN    acetaminophen (TYLENOL) tablet 650 mg  650 mg Oral Q6H PRN    Or    acetaminophen (TYLENOL) suppository 650 mg  650 mg Rectal Q6H PRN    glucose chewable tablet 16 g  4 tablet Oral PRN    dextrose bolus 10% 125 mL  125 mL IntraVENous PRN    Or    dextrose bolus 10% 250 mL  250 mL IntraVENous PRN    glucagon (rDNA) injection 1 mg  1 mg SubCUTAneous PRN    dextrose 10 % infusion   IntraVENous Continuous PRN    insulin lispro (HUMALOG) injection vial 0-4 Units  0-4 Units SubCUTAneous TID WC    insulin lispro (HUMALOG) injection vial 0-4 Units  0-4 Units SubCUTAneous Nightly       Signed:      [facility]. Eleanor Huerta MD, [address]  Internal Medicine - Hospitalist    Part of this note may have been written by using a voice dictation software. The note has been proof read but may still contain some grammatical/other typographical errors.

## 2022-08-07 LAB
ANION GAP SERPL CALC-SCNC: 4 MMOL/L (ref 7–16)
BASOPHILS # BLD: 0 K/UL (ref 0–0.2)
BASOPHILS NFR BLD: 0 % (ref 0–2)
BUN SERPL-MCNC: 14 MG/DL (ref 8–23)
CALCIUM SERPL-MCNC: 8.9 MG/DL (ref 8.3–10.4)
CHLORIDE SERPL-SCNC: 93 MMOL/L (ref 98–107)
CO2 SERPL-SCNC: 34 MMOL/L (ref 21–32)
CREAT SERPL-MCNC: 0.77 MG/DL (ref 0.8–1.5)
DIFFERENTIAL METHOD BLD: ABNORMAL
EOSINOPHIL # BLD: 0 K/UL (ref 0–0.8)
EOSINOPHIL NFR BLD: 0 % (ref 0.5–7.8)
ERYTHROCYTE [DISTWIDTH] IN BLOOD BY AUTOMATED COUNT: 15 % (ref 11.9–14.6)
GLUCOSE BLD STRIP.AUTO-MCNC: 192 MG/DL (ref 65–100)
GLUCOSE BLD STRIP.AUTO-MCNC: 207 MG/DL (ref 65–100)
GLUCOSE BLD STRIP.AUTO-MCNC: 226 MG/DL (ref 65–100)
GLUCOSE BLD STRIP.AUTO-MCNC: 262 MG/DL (ref 65–100)
GLUCOSE SERPL-MCNC: 193 MG/DL (ref 65–100)
HCT VFR BLD AUTO: 26.9 % (ref 41.1–50.3)
HGB BLD-MCNC: 8.8 G/DL (ref 13.6–17.2)
IMM GRANULOCYTES # BLD AUTO: 0.1 K/UL (ref 0–0.5)
IMM GRANULOCYTES NFR BLD AUTO: 1 % (ref 0–5)
LYMPHOCYTES # BLD: 0.7 K/UL (ref 0.5–4.6)
LYMPHOCYTES NFR BLD: 8 % (ref 13–44)
MCH RBC QN AUTO: 31.5 PG (ref 26.1–32.9)
MCHC RBC AUTO-ENTMCNC: 32.7 G/DL (ref 31.4–35)
MCV RBC AUTO: 96.4 FL (ref 79.6–97.8)
MONOCYTES # BLD: 1 K/UL (ref 0.1–1.3)
MONOCYTES NFR BLD: 11 % (ref 4–12)
NEUTS SEG # BLD: 7.1 K/UL (ref 1.7–8.2)
NEUTS SEG NFR BLD: 80 % (ref 43–78)
NRBC # BLD: 0 K/UL (ref 0–0.2)
PLATELET # BLD AUTO: 253 K/UL (ref 150–450)
PMV BLD AUTO: 10.1 FL (ref 9.4–12.3)
POTASSIUM SERPL-SCNC: 4.2 MMOL/L (ref 3.5–5.1)
RBC # BLD AUTO: 2.79 M/UL (ref 4.23–5.6)
SERVICE CMNT-IMP: ABNORMAL
SODIUM SERPL-SCNC: 131 MMOL/L (ref 138–145)
VANCOMYCIN SERPL-MCNC: 28.3 UG/ML
WBC # BLD AUTO: 9 K/UL (ref 4.3–11.1)

## 2022-08-07 PROCEDURE — 94640 AIRWAY INHALATION TREATMENT: CPT

## 2022-08-07 PROCEDURE — 2500000003 HC RX 250 WO HCPCS: Performed by: HOSPITALIST

## 2022-08-07 PROCEDURE — 80202 ASSAY OF VANCOMYCIN: CPT

## 2022-08-07 PROCEDURE — 2700000000 HC OXYGEN THERAPY PER DAY

## 2022-08-07 PROCEDURE — 82962 GLUCOSE BLOOD TEST: CPT

## 2022-08-07 PROCEDURE — 85025 COMPLETE CBC W/AUTO DIFF WBC: CPT

## 2022-08-07 PROCEDURE — 1100000000 HC RM PRIVATE

## 2022-08-07 PROCEDURE — 6370000000 HC RX 637 (ALT 250 FOR IP): Performed by: NURSE PRACTITIONER

## 2022-08-07 PROCEDURE — 94660 CPAP INITIATION&MGMT: CPT

## 2022-08-07 PROCEDURE — 80048 BASIC METABOLIC PNL TOTAL CA: CPT

## 2022-08-07 PROCEDURE — 6370000000 HC RX 637 (ALT 250 FOR IP): Performed by: INTERNAL MEDICINE

## 2022-08-07 PROCEDURE — 6360000002 HC RX W HCPCS: Performed by: HOSPITALIST

## 2022-08-07 PROCEDURE — 2580000003 HC RX 258: Performed by: NURSE PRACTITIONER

## 2022-08-07 PROCEDURE — 6370000000 HC RX 637 (ALT 250 FOR IP): Performed by: STUDENT IN AN ORGANIZED HEALTH CARE EDUCATION/TRAINING PROGRAM

## 2022-08-07 PROCEDURE — 2580000003 HC RX 258: Performed by: HOSPITALIST

## 2022-08-07 PROCEDURE — 6360000002 HC RX W HCPCS: Performed by: NURSE PRACTITIONER

## 2022-08-07 PROCEDURE — 36591 DRAW BLOOD OFF VENOUS DEVICE: CPT

## 2022-08-07 PROCEDURE — 6370000000 HC RX 637 (ALT 250 FOR IP): Performed by: HOSPITALIST

## 2022-08-07 PROCEDURE — 94760 N-INVAS EAR/PLS OXIMETRY 1: CPT

## 2022-08-07 RX ORDER — DIMETHICONE, CAMPHOR (SYNTHETIC), MENTHOL, AND PHENOL 1.1; .5; .625; .5 G/100G; G/100G; G/100G; G/100G
OINTMENT TOPICAL PRN
Status: DISCONTINUED | OUTPATIENT
Start: 2022-08-07 | End: 2022-08-08 | Stop reason: HOSPADM

## 2022-08-07 RX ORDER — PANTOPRAZOLE SODIUM 40 MG/1
40 TABLET, DELAYED RELEASE ORAL
Status: DISCONTINUED | OUTPATIENT
Start: 2022-08-07 | End: 2022-08-08 | Stop reason: HOSPADM

## 2022-08-07 RX ORDER — SODIUM PHOSPHATE, DIBASIC AND SODIUM PHOSPHATE, MONOBASIC 7; 19 G/133ML; G/133ML
1 ENEMA RECTAL
Status: DISPENSED | OUTPATIENT
Start: 2022-08-07 | End: 2022-08-07

## 2022-08-07 RX ORDER — OLANZAPINE 5 MG/1
5 TABLET, ORALLY DISINTEGRATING ORAL NIGHTLY
Status: DISCONTINUED | OUTPATIENT
Start: 2022-08-07 | End: 2022-08-08

## 2022-08-07 RX ADMIN — APIXABAN 5 MG: 5 TABLET, FILM COATED ORAL at 20:43

## 2022-08-07 RX ADMIN — AMLODIPINE BESYLATE 5 MG: 5 TABLET ORAL at 11:24

## 2022-08-07 RX ADMIN — MOMETASONE FUROATE AND FORMOTEROL FUMARATE DIHYDRATE 2 PUFF: 200; 5 AEROSOL RESPIRATORY (INHALATION) at 20:51

## 2022-08-07 RX ADMIN — TAMSULOSIN HYDROCHLORIDE 0.4 MG: 0.4 CAPSULE ORAL at 09:31

## 2022-08-07 RX ADMIN — PANTOPRAZOLE SODIUM 40 MG: 40 TABLET, DELAYED RELEASE ORAL at 16:56

## 2022-08-07 RX ADMIN — ROSUVASTATIN CALCIUM 10 MG: 10 TABLET, FILM COATED ORAL at 09:30

## 2022-08-07 RX ADMIN — FLUTICASONE PROPIONATE 2 SPRAY: 50 SPRAY, METERED NASAL at 09:31

## 2022-08-07 RX ADMIN — AMIODARONE HYDROCHLORIDE 200 MG: 200 TABLET ORAL at 09:30

## 2022-08-07 RX ADMIN — CEFTRIAXONE 2000 MG: 2 INJECTION, POWDER, FOR SOLUTION INTRAMUSCULAR; INTRAVENOUS at 09:15

## 2022-08-07 RX ADMIN — SODIUM CHLORIDE, PRESERVATIVE FREE 10 ML: 5 INJECTION INTRAVENOUS at 09:31

## 2022-08-07 RX ADMIN — VALSARTAN 320 MG: 320 TABLET, FILM COATED ORAL at 11:23

## 2022-08-07 RX ADMIN — CLOPIDOGREL BISULFATE 75 MG: 75 TABLET ORAL at 11:24

## 2022-08-07 RX ADMIN — LATANOPROST 1 DROP: 50 SOLUTION OPHTHALMIC at 09:31

## 2022-08-07 RX ADMIN — FUROSEMIDE 20 MG: 20 TABLET ORAL at 09:30

## 2022-08-07 RX ADMIN — CALCITONIN SALMON 1 SPRAY: 200 SPRAY, METERED NASAL at 09:38

## 2022-08-07 RX ADMIN — MONTELUKAST 10 MG: 10 TABLET, FILM COATED ORAL at 20:43

## 2022-08-07 RX ADMIN — MOMETASONE FUROATE AND FORMOTEROL FUMARATE DIHYDRATE 2 PUFF: 200; 5 AEROSOL RESPIRATORY (INHALATION) at 07:07

## 2022-08-07 RX ADMIN — CARVEDILOL 25 MG: 25 TABLET, FILM COATED ORAL at 16:56

## 2022-08-07 RX ADMIN — APIXABAN 5 MG: 5 TABLET, FILM COATED ORAL at 09:30

## 2022-08-07 RX ADMIN — CARVEDILOL 25 MG: 25 TABLET, FILM COATED ORAL at 09:30

## 2022-08-07 RX ADMIN — OLANZAPINE 5 MG: 10 INJECTION, POWDER, FOR SOLUTION INTRAMUSCULAR at 22:31

## 2022-08-07 RX ADMIN — INSULIN LISPRO 1 UNITS: 100 INJECTION, SOLUTION INTRAVENOUS; SUBCUTANEOUS at 16:56

## 2022-08-07 RX ADMIN — VANCOMYCIN HYDROCHLORIDE 1000 MG: 1 INJECTION, POWDER, LYOPHILIZED, FOR SOLUTION INTRAVENOUS at 14:05

## 2022-08-07 RX ADMIN — POLYETHYLENE GLYCOL 3350 17 G: 17 POWDER, FOR SOLUTION ORAL at 11:23

## 2022-08-07 RX ADMIN — INSULIN LISPRO 2 UNITS: 100 INJECTION, SOLUTION INTRAVENOUS; SUBCUTANEOUS at 12:39

## 2022-08-07 ASSESSMENT — PAIN SCALES - GENERAL
PAINLEVEL_OUTOF10: 0

## 2022-08-07 NOTE — PROGRESS NOTES
Hospitalist Progress Note   Admit Date:  2022  3:41 PM   Name:  Amna Castro   Age:  65 y.o. Sex:  male  :  1945   MRN:  [id number]   Room:  [facility]/01      Reason(s) for Admission: Back pain [M54.9]  General weakness [R53.1]  Acute cystitis without hematuria [N30.00]  Low back pain without sciatica, unspecified back pain laterality, unspecified chronicity [M54.50]     Hospital Course & Interval History:   Amna Castro is a 65 y.o. male with medical history of ICM with ICD/PPM, EF 30-35% afib on eliquis, DM2, obesity, PAD, anemia, COPD/ asthma, CAD, SHERI on CPAP with recent L2-4 kyphoplasty evaluated with diffuse  bilateral LE weakness. He states this has been worse since his kyphoplasty and he has had several falls. Lives with wife Virginia Sykes. Seen at ortho spine today and ordered MRI Lspine to evaluate ongoing pain and weakness. Did have negative bone biopsy with kyphoplasty. Felt worse and not better post procedure. Unable to ambulate well, using walker, has trouble getting up from seated position. No LE paresthesia and no urine issues excluding some issues starting stream. Had some loose BM and can't make it to the bathroom in time. No fever. No dysuria. Urine is darker. Has ICD/PPM- will need clearance for MRI. CT Lspine shows \"  Postoperative changes from kyphoplasty procedure at L2 and L4. No lumbar spine   fracture or malalignment aside from the compression deformities. Multiple levels   of probable spinal canal stenosis as described on recent MRI. \"  UA positive. urine culture showing  more than 50k gram-negative rods and pan sensitive. He was treated with Rocephin empirically. CXR with improved infiltrates. COVID negative. MRI brain no acute infarct. MRI lumbar and cervical spine showed epidural, psoas abscess and osteomyelitis. CRP is elevated 12.4. His Eliquis and Plavix were stopped. He was on heparin drip.   Antibiotics were discontinued and IR was consulted for draining the abscess. Blood cultures were negative. ID was consulted and may need to 6 weeks of antibiotics treatment. IR guided drainage of the psoas abscess was performed. Culture results are pending. His Eliquis and Plavix were resumed after the procedure. His heparin drip was discontinued. He was started on vancomycin and ceftriaxone antibiotics. Tomas was placed as he had urinary retention of 500 mL. Neurology was consulted. Pain management with Percocet and morphine. Cardiology was consulted and ICD device was interrogated and functioning well. .  PT,OT recommended STR. Wife Patrick Mitchell [phone], or [phone]   He is a DNI. Subjective/24hr Events (08/07/22): Patient seen at bedside, is awake but confused. Patient nurse at bedside, reported patient having hallucination overnight. Patient was given 4 for pain in past 24 hours. Patient is falling asleep during conversation and is not able to provide any meaningful information. No reported fever, vomiting or diarrhea. ROS:  10 point review of system could not be obtained as patient is confused. Assessment & Plan:     1.) Back pain  Ortho following  Neurology signed off  Pain management with Percocet and morphine  PT,OT recommended STR     2.) Newly diagnosed Epidural and psoas abscess  S/p IR guided drainage of the abscess and 3 ml is removed  Follow IR aspirate cultures  Continue antibiotics Vanco and ceftriaxone   May need long-term therapy with antibiotics for at least 6 weeks  Plan for PICC line   ID is following, appreciate recommendations with abx X 6 weeks. LORI reyna for [facility] referral  Plan to continue vancomycin at 1250 mg every 12 hours and Rocephin 2 g daily for 6 weeks, EOT 9/9/2022.   VANC trough to be aimed between 15-20.     3.) Acute Osteomyelitis  Anticipate 6 weeks of iv antibiotic therapy      4.) Acute cystitis without hematuria   urine culture showing  more than 50k E coli  and pansensitive   Continue antibiotics    5.) DNI (do not intubate)  Noted and discussed     6.) AMS -   8/7-  Patient is likely having delirium. MRI brain unremarkable for any acute pathology, done on 7/29. Discontinue tramadol and narcotics for  Minimize sedatives. Use bedtime Zyprexa 5 mg and IM Zyprexa 5 mg every 8 hours as needed for agitation. Continue to monitor for now.     7.) Anemia  HGB 8.8 stable   Stable, chronic range for him  Trend HGB     8.) Hypertension  BP is optimally controlled with Norvasc 5 mg p.o. daily, Coreg 25 mg p.o. twice daily, Diovan 320 mg p.o. daily. 9.) Urinary retention  S/p Hoang  Plan for hoang removal on 8/3 and TOV   Continue Flomax    10.) H/O Serratia Sepsis - (10/23/14) in setting of R groin wound infection; S/P Right common femoral artery exploration/ repair of the right common femoral artery/ and sartorious myoplasty 11/5/14;   Operative tissue cx negative     11.) Hyponatremia  Resolved    12.) Ischemic cardiomyopathy Hx - appears compensated    13.) Ventricular tachycardia Hx - (HCC)-continue Coreg 25 mg p.o. twice daily. And amiodarone 200 mg p.o. daily  Continue Eliquis 5 mg p.o. twice daily    14.) Coronary artery disease involving native coronary - med rx    15.) PAD (peripheral artery disease) ([facility])  Continue aspirin, Plavix, Eliquis and statin. 16.) ICD (implantable cardioverter-defibrillator) in place  S/p interrogation and was functioning well  Cardio signed off    17.) Atrial fibrillation ([address]  Plan:   Continue Eliquis and Plavix  Continue  amiodarone, statin      18.) Severe obesity (BMI 35.0-39. 9) with comorbidity ([address]  Plan:   Needs modification      19.) COPD (chronic obstructive pulmonary disease) ([facility])  Plan:   Resume inhalers   ABG ok     20.) DM (diabetes mellitus) type II, controlled, with peripheral vascular disorder ([facility]  Plan:   SSI      21.) Obstructive sleep apnea  Plan:   Resume CPAP     22.) Debility  Patient will require LTAC placement with rehab on discharge.       I spent 38 minutes of time caring for this patient on the unit nearby or at bedside, and more than 50 percent was spent on coordination of care activities, and/or patient/family counseling regarding status and plan of care. Diet:  ADULT DIET; Regular; 3 carb choices (45 gm/meal); Low Fat/Low Chol/High Fiber/ANGELA  ADULT ORAL NUTRITION SUPPLEMENT; Breakfast, Lunch, Dinner; Diabetic Oral Supplement  ADULT ORAL NUTRITION SUPPLEMENT; Lunch, Dinner; Frozen Oral Supplement  DVT Ppx Eliquis  Code status: Limited    Hospital Problems             Last Modified POA    * (Principal) Back pain 7/27/2022 Yes    ICD (implantable cardioverter-defibrillator) in place 7/27/2022 Yes    General weakness 7/27/2022 Yes    Acute cystitis without hematuria 7/27/2022 Yes    Low back pain without sciatica 7/27/2022 Yes    DNI (do not intubate) (Chronic) 7/27/2022 Yes    UTI (urinary tract infection) 7/27/2022 Yes    Elevated liver enzymes 7/27/2022 Yes    Anemia (Chronic) 7/27/2022 Yes    Hyponatremia 7/27/2022 Yes    Atrial fibrillation ([address] 7/27/2022 Yes    Severe obesity (BMI 35.0-39. 9) with comorbidity ([address] 7/27/2022 Yes    COPD (chronic obstructive pulmonary disease) ([address] 7/27/2022 Yes    Intermittent spinal claudication ([address] 7/27/2022 Yes    Ischemic cardiomyopathy 7/27/2022 Yes    Ventricular tachycardia ([address] 7/27/2022 Yes    Coronary artery disease involving native coronary artery of native heart without angina pectoris 7/27/2022 Yes    PAD (peripheral artery disease) ([address] 7/27/2022 Yes    Asthma 7/27/2022 Yes    DM (diabetes mellitus) type II, controlled, with peripheral vascular disorder ([address] 7/27/2022 Yes    Obstructive sleep apnea 7/27/2022 Yes   Objective:   Patient Vitals for the past 24 hrs:   Temp Pulse Resp BP SpO2   08/07/22 0722 98.8 °F (37.1 °C) 71 22 (!) 113/97 95 %   08/07/22 0707 -- 76 18 -- 94 %   08/07/22 0231 98.3 °F (36.8 °C) 67 18 126/73 95 %   08/07/22 0139 -- -- -- -- 95 %   08/06/22 2326 98.4 °F (36.9 °C) 69 18 124/68 96 %   08/06/22 2246 -- -- -- -- 95 %   08/06/22 2040 -- 69 18 -- 95 %   08/06/22 1831 98.3 °F (36.8 °C) 62 21 124/73 96 %   08/06/22 1453 98.4 °F (36.9 °C) 63 20 134/70 94 %   08/06/22 1032 99.3 °F (37.4 °C) 60 20 (!) 132/91 91 %   08/06/22 0800 -- 64 18 -- 98 %         Estimated body mass index is 34.25 kg/m² as calculated from the following:    Height as of this encounter: 6' 1\" (1.854 m). Weight as of this encounter: 259 lb 10 oz (117.8 kg). Intake/Output Summary (Last 24 hours) at 8/7/2022 0743  Last data filed at 8/6/2022 2050  Gross per 24 hour   Intake 1355.68 ml   Output --   Net 1355.68 ml           Physical Exam:     Blood pressure (!) 113/97, pulse 71, temperature 98.8 °F (37.1 °C), temperature source Axillary, resp. rate 22, height 6' 1\" (1.854 m), weight 259 lb 10 oz (117.8 kg), SpO2 95 %. General:    Obese, confused, not agitated, on 1 to 2 L via NC  HEENT:          head NCAT, PERRLA positive, MMM  Neck:  No restricted ROM. Trachea midline. CV:   RRR. No m/r/g. No jugular venous distension. Lungs:   Diminished breath sounds bilaterally, no wheezing or rhonchi  Abdomen:   Soft, obese, nontender, nondistended  Extremities: No cyanosis or clubbing. No edema. Tenderness of the back is present. Skin:     No rashes and normal coloration. Warm and dry.     Neuro:  Moving all 4 extremities spontaneously, follows commands intermittently  Psych:  Oriented to self, confused    I have reviewed ordered lab tests and independently visualized imaging below:    Recent Labs:  Recent Results (from the past 48 hour(s))   POCT Glucose    Collection Time: 08/05/22 11:05 AM   Result Value Ref Range    POC Glucose 198 (H) 65 - 100 mg/dL    Performed by: Miguel    POCT Glucose    Collection Time: 08/05/22  4:13 PM   Result Value Ref Range    POC Glucose 226 (H) 65 - 100 mg/dL    Performed by: Miguel    POCT Glucose    Collection Time: 08/05/22  9:04 PM Result Value Ref Range    POC Glucose 182 (H) 65 - 100 mg/dL    Performed by: Evin    POCT Glucose    Collection Time: 08/06/22  7:52 AM   Result Value Ref Range    POC Glucose 200 (H) 65 - 100 mg/dL    Performed by: Agapito    POCT Glucose    Collection Time: 08/06/22 11:24 AM   Result Value Ref Range    POC Glucose 183 (H) 65 - 100 mg/dL    Performed by: Radha    POCT Glucose    Collection Time: 08/06/22  4:16 PM   Result Value Ref Range    POC Glucose 198 (H) 65 - 100 mg/dL    Performed by:  Agapito    POCT Glucose    Collection Time: 08/06/22  8:51 PM   Result Value Ref Range    POC Glucose 195 (H) 65 - 100 mg/dL    Performed by: Maribell    Vancomycin Level, Random    Collection Time: 08/07/22  3:00 AM   Result Value Ref Range    Vancomycin Rm 28.3 UG/ML   CBC with Auto Differential    Collection Time: 08/07/22  3:00 AM   Result Value Ref Range    WBC 9.0 4.3 - 11.1 K/uL    RBC 2.79 (L) 4.23 - 5.6 M/uL    Hemoglobin 8.8 (L) 13.6 - 17.2 g/dL    Hematocrit 26.9 (L) 41.1 - 50.3 %    MCV 96.4 79.6 - 97.8 FL    MCH 31.5 26.1 - 32.9 PG    MCHC 32.7 31.4 - 35.0 g/dL    RDW 15.0 (H) 11.9 - 14.6 %    Platelets 109 151 - 640 K/uL    MPV 10.1 9.4 - 12.3 FL    nRBC 0.00 0.0 - 0.2 K/uL    Differential Type AUTOMATED      Seg Neutrophils 80 (H) 43 - 78 %    Lymphocytes 8 (L) 13 - 44 %    Monocytes 11 4.0 - 12.0 %    Eosinophils % 0 (L) 0.5 - 7.8 %    Basophils 0 0.0 - 2.0 %    Immature Granulocytes 1 0.0 - 5.0 %    Segs Absolute 7.1 1.7 - 8.2 K/UL    Absolute Lymph # 0.7 0.5 - 4.6 K/UL    Absolute Mono # 1.0 0.1 - 1.3 K/UL    Absolute Eos # 0.0 0.0 - 0.8 K/UL    Basophils Absolute 0.0 0.0 - 0.2 K/UL    Absolute Immature Granulocyte 0.1 0.0 - 0.5 K/UL   Basic Metabolic Panel w/ Reflex to MG    Collection Time: 08/07/22  3:00 AM   Result Value Ref Range    Sodium 131 (L) 138 - 145 mmol/L    Potassium 4.2 3.5 - 5.1 mmol/L    Chloride 93 (L) 98 - 107 mmol/L    CO2 34 (H) 21 - 32 mmol/L    Anion Gap 4 (L) 7 - 16 mmol/L    Glucose 193 (H) 65 - 100 mg/dL    BUN 14 8 - 23 MG/DL    Creatinine 0.77 (L) 0.8 - 1.5 MG/DL    GFR African American >60 >60 ml/min/1.73m2    GFR Non- >60 >60 ml/min/1.73m2    Calcium 8.9 8.3 - 10.4 MG/DL         Other Studies:  XR LUMBAR SPINE (2-3 VIEWS)    Result Date: 7/27/2022  AUTOMATIC ADMINISTRATIVE RESULT The result for this exam can be found in the Progress note in the chart. See Progress note in the chart. CT LUMBAR SPINE WO CONTRAST    Result Date: 7/27/2022  EXAMINATION: CT LUMBAR SPINE WO CONTRAST 7/27/2022 6:21 PM ACCESSION NUMBER: [id number] COMPARISON: MRI lumbar spine 06/02/2022. INDICATION: Recent kyphoplasty with fall. TECHNIQUE: Multiple-row detector helical CT examination of the lumbar spine was performed without intravenous contrast. Multiplanar reformats were provided. Radiation dose reduction techniques were used for this study. Our CT scanners use one or all of the following: Automated exposure control, adjustment of the mA and/or kV according to patient size, iterative reconstruction. FINDINGS: Patient is status post interval kyphoplasty procedure at L2 and L4 when compared to prior MRI. Mild compression fractures are noted at these levels. No retropulsed fragments noted in the spinal canal. Multilevel degenerative disc changes are present. No malalignment. Areas of spinal canal stenosis suspected as described on recent prior MRI. Postoperative changes from kyphoplasty procedure at L2 and L4. No lumbar spine fracture or malalignment aside from the compression deformities. Multiple levels of probable spinal canal stenosis as described on recent MRI. XR CHEST PORTABLE    Result Date: 7/27/2022  XR CHEST PORTABLE 7/27/2022 6:35 PM HISTORY: Weakness. COMPARISON: Chest x-ray 3/7/2022. A portable AP view of the chest was obtained.      Pulmonary infiltrate predominantly in the right upper lobe and mid to lower left lung has mostly cleared. Atelectasis or scarring right lung base appears stable. Stable cardiomegaly. Implantable cardiac device left chest and both leads are unchanged. No pneumothorax. No pleural effusions.        Current Meds:  Current Facility-Administered Medications   Medication Dose Route Frequency    QUEtiapine (SEROQUEL) tablet 25 mg  25 mg Oral Nightly    traMADol (ULTRAM) tablet 50 mg  50 mg Oral Q6H PRN    bisacodyl (DULCOLAX) suppository 10 mg  10 mg Rectal Daily PRN    morphine injection 2 mg  2 mg IntraVENous Q4H PRN    lidocaine 4 % external patch 1 patch  1 patch TransDERmal Daily    amLODIPine (NORVASC) tablet 5 mg  5 mg Oral Daily    cefTRIAXone (ROCEPHIN) 2,000 mg in sodium chloride 0.9 % 50 mL IVPB mini-bag  2,000 mg IntraVENous Q24H    sodium chloride flush 0.9 % injection 5-40 mL  5-40 mL IntraVENous 2 times per day    sodium chloride flush 0.9 % injection 5-40 mL  5-40 mL IntraVENous PRN    0.9 % sodium chloride infusion  25 mL IntraVENous PRN    vancomycin (VANCOCIN) 1250 mg in sodium chloride 0.9% 250 mL IVPB  1,250 mg IntraVENous Q12H    heparin (porcine) injection 2,000 Units  2,000 Units IntraVENous Once    amiodarone (CORDARONE) tablet 200 mg  200 mg Oral Daily    tamsulosin (FLOMAX) capsule 0.4 mg  0.4 mg Oral Daily    albuterol sulfate HFA (PROVENTIL;VENTOLIN;PROAIR) 108 (90 Base) MCG/ACT inhaler 2 puff  2 puff Inhalation Q4H PRN    apixaban (ELIQUIS) tablet 5 mg  5 mg Oral 2 times per day    calcitonin (MIACALCIN) nasal spray 1 spray  1 spray Alternating Nares Daily    carvedilol (COREG) tablet 25 mg  25 mg Oral BID WC    clopidogrel (PLAVIX) tablet 75 mg  75 mg Oral Daily    fluticasone (FLONASE) 50 MCG/ACT nasal spray 2 spray  2 spray Each Nostril Daily    mometasone-formoterol (DULERA) 200-5 MCG/ACT inhaler 2 puff  2 puff Inhalation BID    furosemide (LASIX) tablet 20 mg  20 mg Oral Daily    latanoprost (XALATAN) 0.005 % ophthalmic solution 1 drop  1 drop Ophthalmic Daily

## 2022-08-07 NOTE — PROGRESS NOTES
END OF SHIFT SUMMARY:    Significant vitals this shift:  0  Significant labs this shift:  0  Tests performed this shift:  0  Orders to be followed up on:  0  Blood products given this shift:  0  Additional events this shift:   Pt more awake today; still confused. Pt had 1 large soft, formed BM. I/Os:  +/- this shift:   08/07 0701 - 08/07 1900  In: 480 [P.O.:480]  Out: 400 [Urine:400]  Occurrences this Shift:  Urine 2, BM 1, Emesis Pt constantly hacking up large amounts of phlegm. (Pt does normally chew tobacco so he has a lot of secretions). Still poor appetite.      Edwige Interiano RN

## 2022-08-07 NOTE — PLAN OF CARE
Problem: Pain  Goal: Verbalizes/displays adequate comfort level or baseline comfort level  8/7/2022 1053 by Ranjan Riojas RN  Outcome: Progressing  8/6/2022 2251 by Lianet Concepcion RN  Outcome: Progressing  Flowsheets (Taken 8/6/2022 2050)  Verbalizes/displays adequate comfort level or baseline comfort level:   Encourage patient to monitor pain and request assistance   Assess pain using appropriate pain scale   Administer analgesics based on type and severity of pain and evaluate response   Implement non-pharmacological measures as appropriate and evaluate response     Problem: Safety - Adult  Goal: Free from fall injury  8/7/2022 1053 by Ranjan Riojas RN  Outcome: Progressing  8/6/2022 2251 by Lianet Concepcion RN  Outcome: Progressing     Problem: Chronic Conditions and Co-morbidities  Goal: Patient's chronic conditions and co-morbidity symptoms are monitored and maintained or improved  8/7/2022 1053 by Ranjan Riojas RN  Outcome: Progressing  8/6/2022 2251 by Lianet Concepcion RN  Outcome: Progressing  Flowsheets (Taken 8/6/2022 2050)  Care Plan - Patient's Chronic Conditions and Co-Morbidity Symptoms are Monitored and Maintained or Improved:   Monitor and assess patient's chronic conditions and comorbid symptoms for stability, deterioration, or improvement   Collaborate with multidisciplinary team to address chronic and comorbid conditions and prevent exacerbation or deterioration   Update acute care plan with appropriate goals if chronic or comorbid symptoms are exacerbated and prevent overall improvement and discharge     Problem: Skin/Tissue Integrity  Goal: Absence of new skin breakdown  Description: 1. Monitor for areas of redness and/or skin breakdown  2. Assess vascular access sites hourly  3. Every 4-6 hours minimum:  Change oxygen saturation probe site  4.   Every 4-6 hours:  If on nasal continuous positive airway pressure, respiratory therapy assess nares and determine need for appliance change or resting period. 8/7/2022 1053 by Danyelle Carlson RN  Outcome: Progressing  8/6/2022 2251 by Ute Antonio RN  Outcome: Progressing     Problem: ABCDS Injury Assessment  Goal: Absence of physical injury  8/7/2022 1053 by Danyelle Carlson RN  Outcome: Progressing  8/6/2022 2251 by Ute Antonio RN  Outcome: Progressing     Problem: Respiratory - Adult  Goal: Achieves optimal ventilation and oxygenation  8/7/2022 1053 by Danyelle Carlson RN  Outcome: Progressing  8/6/2022 2251 by Ute Antonio RN  Outcome: Progressing  Flowsheets (Taken 8/6/2022 2050)  Achieves optimal ventilation and oxygenation:   Assess for changes in respiratory status   Assess for changes in mentation and behavior   Position to facilitate oxygenation and minimize respiratory effort   Oxygen supplementation based on oxygen saturation or arterial blood gases     Problem: Confusion  Goal: Confusion, delirium, dementia, or psychosis is improved or at baseline  Description: INTERVENTIONS:  1. Assess for possible contributors to thought disturbance, including medications, impaired vision or hearing, underlying metabolic abnormalities, dehydration, psychiatric diagnoses, and notify attending LIP  2. Nicholson high risk fall precautions, as indicated  3. Provide frequent short contacts to provide reality reorientation, refocusing and direction  4. Decrease environmental stimuli, including noise as appropriate  5. Monitor and intervene to maintain adequate nutrition, hydration, elimination, sleep and activity  6. If unable to ensure safety without constant attention obtain sitter and review sitter guidelines with assigned personnel  7.  Initiate Psychosocial CNS and Spiritual Care consult, as indicated  8/7/2022 1053 by Danyelle Carlson RN  Outcome: Progressing  8/6/2022 2251 by Ute Antonio RN  Outcome: Progressing  Flowsheets (Taken 8/6/2022 2050)  Effect of thought disturbance (confusion, delirium, dementia, or psychosis) are managed with adequate functional status:   Assess for contributors to thought disturbance, including medications, impaired vision or hearing, underlying metabolic abnormalities, dehydration, psychiatric diagnoses, notify Denilson Hamilton high risk fall precautions, as indicated   Provide frequent short contacts to provide reality reorientation, refocusing and direction   Decrease environmental stimuli, including noise as appropriate   Monitor and intervene to maintain adequate nutrition, hydration, elimination, sleep and activity

## 2022-08-08 VITALS
WEIGHT: 218.48 LBS | HEART RATE: 72 BPM | HEIGHT: 73 IN | OXYGEN SATURATION: 93 % | TEMPERATURE: 98.8 F | SYSTOLIC BLOOD PRESSURE: 162 MMHG | DIASTOLIC BLOOD PRESSURE: 73 MMHG | BODY MASS INDEX: 28.96 KG/M2 | RESPIRATION RATE: 20 BRPM

## 2022-08-08 LAB
GLUCOSE BLD STRIP.AUTO-MCNC: 199 MG/DL (ref 65–100)
GLUCOSE BLD STRIP.AUTO-MCNC: 251 MG/DL (ref 65–100)
GLUCOSE BLD STRIP.AUTO-MCNC: 261 MG/DL (ref 65–100)
SERVICE CMNT-IMP: ABNORMAL

## 2022-08-08 PROCEDURE — 6370000000 HC RX 637 (ALT 250 FOR IP): Performed by: HOSPITALIST

## 2022-08-08 PROCEDURE — 94760 N-INVAS EAR/PLS OXIMETRY 1: CPT

## 2022-08-08 PROCEDURE — 2580000003 HC RX 258: Performed by: HOSPITALIST

## 2022-08-08 PROCEDURE — 94640 AIRWAY INHALATION TREATMENT: CPT

## 2022-08-08 PROCEDURE — 6360000002 HC RX W HCPCS: Performed by: HOSPITALIST

## 2022-08-08 PROCEDURE — 82962 GLUCOSE BLOOD TEST: CPT

## 2022-08-08 PROCEDURE — 2700000000 HC OXYGEN THERAPY PER DAY

## 2022-08-08 PROCEDURE — 6370000000 HC RX 637 (ALT 250 FOR IP): Performed by: STUDENT IN AN ORGANIZED HEALTH CARE EDUCATION/TRAINING PROGRAM

## 2022-08-08 PROCEDURE — 2580000003 HC RX 258: Performed by: NURSE PRACTITIONER

## 2022-08-08 PROCEDURE — 6370000000 HC RX 637 (ALT 250 FOR IP): Performed by: INTERNAL MEDICINE

## 2022-08-08 PROCEDURE — 6370000000 HC RX 637 (ALT 250 FOR IP): Performed by: NURSE PRACTITIONER

## 2022-08-08 RX ORDER — OLANZAPINE 5 MG/1
5 TABLET, ORALLY DISINTEGRATING ORAL NIGHTLY
Qty: 30 TABLET | Refills: 3
Start: 2022-08-08 | End: 2022-10-04 | Stop reason: ALTCHOICE

## 2022-08-08 RX ORDER — PANTOPRAZOLE SODIUM 40 MG/1
40 TABLET, DELAYED RELEASE ORAL
Qty: 30 TABLET | Refills: 3
Start: 2022-08-08 | End: 2022-11-03

## 2022-08-08 RX ORDER — TAMSULOSIN HYDROCHLORIDE 0.4 MG/1
0.4 CAPSULE ORAL DAILY
Qty: 30 CAPSULE | Refills: 3
Start: 2022-08-09 | End: 2022-10-04 | Stop reason: ALTCHOICE

## 2022-08-08 RX ORDER — BISACODYL 10 MG
10 SUPPOSITORY, RECTAL RECTAL DAILY PRN
Qty: 10 SUPPOSITORY | Refills: 0
Start: 2022-08-08 | End: 2022-08-18

## 2022-08-08 RX ORDER — INSULIN LISPRO 100 [IU]/ML
0-8 INJECTION, SOLUTION INTRAVENOUS; SUBCUTANEOUS
Status: DISCONTINUED | OUTPATIENT
Start: 2022-08-08 | End: 2022-08-08 | Stop reason: HOSPADM

## 2022-08-08 RX ORDER — FUROSEMIDE 20 MG/1
20 TABLET ORAL DAILY
Qty: 60 TABLET | Refills: 3
Start: 2022-08-09

## 2022-08-08 RX ORDER — OLANZAPINE 5 MG/1
5 TABLET, ORALLY DISINTEGRATING ORAL NIGHTLY
Status: DISCONTINUED | OUTPATIENT
Start: 2022-08-08 | End: 2022-08-08 | Stop reason: HOSPADM

## 2022-08-08 RX ORDER — QUETIAPINE FUMARATE 25 MG/1
25 TABLET, FILM COATED ORAL DAILY
Status: DISCONTINUED | OUTPATIENT
Start: 2022-08-08 | End: 2022-08-08 | Stop reason: HOSPADM

## 2022-08-08 RX ORDER — QUETIAPINE FUMARATE 25 MG/1
25 TABLET, FILM COATED ORAL DAILY
Qty: 60 TABLET | Refills: 3
Start: 2022-08-09 | End: 2022-09-16

## 2022-08-08 RX ORDER — AMLODIPINE BESYLATE 5 MG/1
5 TABLET ORAL DAILY
Qty: 30 TABLET | Refills: 3
Start: 2022-08-09 | End: 2022-09-16

## 2022-08-08 RX ORDER — LIDOCAINE 4 G/G
1 PATCH TOPICAL DAILY
Qty: 14 PATCH | Refills: 1
Start: 2022-08-09 | End: 2022-08-23

## 2022-08-08 RX ORDER — INSULIN LISPRO 100 [IU]/ML
0-4 INJECTION, SOLUTION INTRAVENOUS; SUBCUTANEOUS NIGHTLY
Status: DISCONTINUED | OUTPATIENT
Start: 2022-08-08 | End: 2022-08-08 | Stop reason: HOSPADM

## 2022-08-08 RX ORDER — OLANZAPINE 5 MG/1
10 TABLET, ORALLY DISINTEGRATING ORAL NIGHTLY
Status: DISCONTINUED | OUTPATIENT
Start: 2022-08-08 | End: 2022-08-08

## 2022-08-08 RX ADMIN — QUETIAPINE FUMARATE 25 MG: 25 TABLET ORAL at 09:40

## 2022-08-08 RX ADMIN — TAMSULOSIN HYDROCHLORIDE 0.4 MG: 0.4 CAPSULE ORAL at 09:32

## 2022-08-08 RX ADMIN — LATANOPROST 1 DROP: 50 SOLUTION OPHTHALMIC at 09:35

## 2022-08-08 RX ADMIN — MOMETASONE FUROATE AND FORMOTEROL FUMARATE DIHYDRATE 2 PUFF: 200; 5 AEROSOL RESPIRATORY (INHALATION) at 08:15

## 2022-08-08 RX ADMIN — APIXABAN 5 MG: 5 TABLET, FILM COATED ORAL at 09:31

## 2022-08-08 RX ADMIN — CEFTRIAXONE 2000 MG: 2 INJECTION, POWDER, FOR SOLUTION INTRAMUSCULAR; INTRAVENOUS at 09:30

## 2022-08-08 RX ADMIN — CLOPIDOGREL BISULFATE 75 MG: 75 TABLET ORAL at 09:32

## 2022-08-08 RX ADMIN — VANCOMYCIN HYDROCHLORIDE 1000 MG: 1 INJECTION, POWDER, LYOPHILIZED, FOR SOLUTION INTRAVENOUS at 02:53

## 2022-08-08 RX ADMIN — CALCITONIN SALMON 1 SPRAY: 200 SPRAY, METERED NASAL at 09:40

## 2022-08-08 RX ADMIN — VALSARTAN 320 MG: 320 TABLET, FILM COATED ORAL at 09:32

## 2022-08-08 RX ADMIN — FLUTICASONE PROPIONATE 2 SPRAY: 50 SPRAY, METERED NASAL at 09:35

## 2022-08-08 RX ADMIN — ROSUVASTATIN CALCIUM 10 MG: 10 TABLET, FILM COATED ORAL at 09:32

## 2022-08-08 RX ADMIN — AMLODIPINE BESYLATE 5 MG: 5 TABLET ORAL at 09:32

## 2022-08-08 RX ADMIN — AMIODARONE HYDROCHLORIDE 200 MG: 200 TABLET ORAL at 09:32

## 2022-08-08 RX ADMIN — PANTOPRAZOLE SODIUM 40 MG: 40 TABLET, DELAYED RELEASE ORAL at 09:31

## 2022-08-08 RX ADMIN — INSULIN LISPRO 4 UNITS: 100 INJECTION, SOLUTION INTRAVENOUS; SUBCUTANEOUS at 12:45

## 2022-08-08 RX ADMIN — SODIUM CHLORIDE, PRESERVATIVE FREE 10 ML: 5 INJECTION INTRAVENOUS at 09:36

## 2022-08-08 RX ADMIN — CARVEDILOL 25 MG: 25 TABLET, FILM COATED ORAL at 09:32

## 2022-08-08 RX ADMIN — FUROSEMIDE 20 MG: 20 TABLET ORAL at 09:32

## 2022-08-08 ASSESSMENT — PAIN SCALES - GENERAL
PAINLEVEL_OUTOF10: 0
PAINLEVEL_OUTOF10: 0

## 2022-08-08 NOTE — FLOWSHEET NOTE
08/08/22 0700   Handoff   Communication Given Shift Handoff   Handoff Received From Keenan Negrete RN   Handoff Communication Face to Face; At bedside   Time Handoff Given 0700

## 2022-08-08 NOTE — PROGRESS NOTES
Report called to [facility] [place], RN with [facility]. Wife @ bedside aware and agreeable to transfer.

## 2022-08-08 NOTE — DISCHARGE SUMMARY
Hospitalist Discharge Summary     Patient ID:  Ronni Sweeney  [id number]  89 y.o.  1945  Admit date: 7/27/2022  3:41 PM  Discharge date and time: 8/8/2022  Attending: Guillermina Keita MD  PCP:  Leena Klein MD  Treatment Team: Attending Provider: Guillermina Keita MD; Consulting Physician: Bright Burkett MD; Consulting Physician: Sherly Rosas MD; : Colletta Link, MSW; Utilization Reviewer: Saul Baxter RN; Hospitalist: Guillermina Keita MD; Physical Therapist: Royal Smiht, PT; Occupational Therapist Assistant: COLETTE Fishman    Principal Diagnosis Back pain   Principal Problem:    Back pain  Active Problems:    ICD (implantable cardioverter-defibrillator) in place    General weakness    Acute cystitis without hematuria    Low back pain without sciatica    DNI (do not intubate)    UTI (urinary tract infection)    Elevated liver enzymes    Anemia    Hyponatremia    Atrial fibrillation ([address]    Severe obesity (BMI 35.0-39. 9) with comorbidity ([address]    COPD (chronic obstructive pulmonary disease) (HCC)    Intermittent spinal claudication (HCC)    Ischemic cardiomyopathy    Ventricular tachycardia (HCC)    Coronary artery disease involving native coronary artery of native heart without angina pectoris    PAD (peripheral artery disease) (HCC)    Asthma    DM (diabetes mellitus) type II, controlled, with peripheral vascular disorder ([address]    Obstructive sleep apnea  Resolved Problems:    * No resolved hospital problems. [facility] Course: Ronni Sweeney is a 65 y.o. male with medical history of ICM with ICD/PPM, EF 30-35% afib on eliquis, DM2, obesity, PAD, anemia, COPD/ asthma, CAD, SHERI on CPAP with recent L2-4 kyphoplasty evaluated with diffuse  bilateral LE weakness. He states this has been worse since his kyphoplasty and he has had several falls. Lives with wife Sergio Silva.  Seen at ortho spine today and ordered MRI Lspine to evaluate ongoing pain and weakness. Did have negative bone biopsy with kyphoplasty. Felt worse and not better post procedure. Unable to ambulate well, using walker, has trouble getting up from seated position. No LE paresthesia and no urine issues excluding some issues starting stream. Had some loose BM and can't make it to the bathroom in time. No fever. No dysuria. Urine is darker. Has ICD/PPM- will need clearance for MRI. CT Lspine shows \"  Postoperative changes from kyphoplasty procedure at L2 and L4. No lumbar spine   fracture or malalignment aside from the compression deformities. Multiple levels   of probable spinal canal stenosis as described on recent MRI. \"  UA positive. urine culture showing  more than 50k gram-negative rods and pan sensitive. He was treated with Rocephin empirically. CXR with improved infiltrates. COVID negative. MRI brain no acute infarct. MRI lumbar and cervical spine showed epidural, psoas abscess and osteomyelitis. CRP is elevated 12.4. His Eliquis and Plavix were stopped. He was on heparin drip. Antibiotics were discontinued and IR was consulted for draining the abscess. Blood cultures were negative. ID was consulted and may need to 6 weeks of antibiotics treatment. IR guided drainage of the psoas abscess was performed. Culture results are pending. His Eliquis and Plavix were resumed after the procedure. His heparin drip was discontinued. He was started on vancomycin and ceftriaxone antibiotics. Tomas was placed as he had urinary retention of 500 mL. Neurology was consulted. Pain management with Percocet and morphine. Cardiology was consulted and ICD device was interrogated and functioning well. .  PT,OT recommended STR.      1.) Back pain  Ortho following  Neurology signed off  Pain management with Percocet and morphine  PT,OT recommended STR     2.) Newly diagnosed Epidural and psoas abscess  S/p IR guided drainage of the abscess and 3 ml is removed  Follow IR aspirate cultures  Continue antibiotics Vanco and ceftriaxone  May need long-term therapy with antibiotics for at least 6 weeks  Plan for PICC line  ID is following, appreciate recommendations with abx X 6 weeks. LORI reyna for [facility] referral  Plan to continue vancomycin at 1250 mg every 12 hours and Rocephin 2 g daily for 6 weeks, EOT 9/9/2022. VANC trough to be aimed between 15-20.     3.) Acute Osteomyelitis  Anticipate 6 weeks of iv antibiotic therapy      4.) Acute cystitis without hematuria   urine culture showing  more than 50k E coli  and pansensitive   Continue antibiotics     5.) DNI (do not intubate)  Noted and discussed      6.) Encephalopathy -   8/8-  Patient is likely having delirium. MRI brain unremarkable for any acute pathology, done on 7/29. Discontinue tramadol and narcotics for  Minimize sedatives. Use bedtime Zyprexa 5 mg and IM Zyprexa 5 mg every 8 hours as needed for agitation. Seroquel 25 mg po daily     7.) Anemia  HGB 8.8 stable  Stable, chronic range for him  Trend HGB      8.) Hypertension  BP is optimally controlled with Norvasc 5 mg p.o. daily, Coreg 25 mg p.o. twice daily, Diovan 320 mg p.o. daily. 9.) Urinary retention  S/p Hoang  Plan for hoang removal on 8/3 and TOV  Continue Flomax     10.) H/O Serratia Sepsis - (10/23/14) in setting of R groin wound infection; S/P Right common femoral artery exploration/ repair of the right common femoral artery/ and sartorious myoplasty 11/5/14;   Operative tissue cx negative     11.) Hyponatremia  Resolved     12.) Ischemic cardiomyopathy Hx - appears compensated     13.) Ventricular tachycardia Hx - (HCC)-continue Coreg 25 mg p.o. twice daily. And amiodarone 200 mg p.o. daily  Continue Eliquis 5 mg p.o. twice daily     14.) Coronary artery disease involving native coronary - med rx     15.) PAD (peripheral artery disease) (HCC)  Continue aspirin, Plavix, Eliquis and statin.         16.) ICD (implantable cardioverter-defibrillator) in place  S/p interrogation and was functioning well  Cardio signed off     17.) Atrial fibrillation ([facility]  Plan:   Continue Eliquis and Plavix  Continue  amiodarone, statin      18.) Severe obesity (BMI 35.0-39. 9) with comorbidity ([address]  Plan:   Needs modification      19.) COPD (chronic obstructive pulmonary disease) ([facility])  Plan:   Resume inhalers   ABG ok     20.) DM (diabetes mellitus) type II, controlled, with peripheral vascular disorder ([address]  Plan:   SSI      21.) Obstructive sleep apnea  Plan:   Resume CPAP     22.) Debility    Patient is hemodynamically stable and medically cleared to be transferred to [facility]. Case discussed with Dr. Pearl Martinez, he has graciously excepted the patient's transfer to [facility] today. Rest of the hospital course was uneventful, for further details please refer to daily progress notes    Significant Diagnostic Studies:   EXAMINATION: MRI CERVICAL SPINE WO CONTRAST 7/29/2022 1:30 PM       ACCESSION NUMBER: [id number]       COMPARISON: Same-day brain MRI. INDICATION: Leg claudication       TECHNIQUE: Multi-sequence, multi-planar MR images were obtained of the cervical   spine without the administration of intravenous contrast.       FINDINGS:    The visualized posterior fossa is better evaluated on same-day MRI brain. The spinal cord demonstrates normal signal and morphology. Vertebral body heights are maintained. Mild anterolisthesis of C4 on C5 and C5   on C6. No acute osseous abnormality. No suspicious osseous lesion. Multilevel intervertebral disc height loss. Multilevel facet arthropathy. The paravertebral soft tissues are within normal limits. Level specific findings:       C2-C3: Posterior disc bulge with superimposed right paracentral protrusion and   uncovertebral hypertrophy. Mild spinal canal narrowing. Moderate right-sided   neural foraminal narrowing. No left neural foraminal narrowing. C3-C4: Facet arthropathy and uncovertebral hypertrophy. Mild spinal canal   narrowing. Moderate left and mild right neural foraminal narrowing. C4-C5: Disc osteophyte complex with central disc protrusion which indents the   ventral cord and results in moderate to severe spinal canal. Facet arthropathy,   and uncovertebral hypertrophy. Moderate right and severe left neural foraminal   narrowing. C5-C6: Posterior disc osteophyte complex, likely due to hypertrophy, and facet   arthropathy resulting in mild spinal canal narrowing. Moderate left and mild   right neural foraminal narrowing. C6-C7: Posterior disc osteophyte complex, uncovertebral hypertrophy, and facet   arthropathy. Mild spinal canal narrowing. Moderate bilateral neural foraminal   narrowing. C7-T1: No canal or foraminal stenosis. Impression       1. Multilevel degenerative changes of the cervical spine resulting in multilevel   spinal canal narrowing, most pronounced at T4-C5, where there is moderate to   severe spinal canal narrowing. High-grade multilevel neural foraminal narrowing,   as detailed above. 2. Mild anterolisthesis of C4 on C5 and C5 on C6, likely degenerative. Exam: MRI BRAIN WO CONTRAST on 7/29/2022 1:24 PM       Clinical History: The Male patient is 65years old presenting for abnormal gait   and bilateral lower extremity weakness. Comparison:  Head CT 5/11/2021       Technique:  Axial T2, axial FLAIR, axial diffusion-weighted,  sagittal T1 and   coronal gradient-echo scans were performed. Findings:        Age-related senescent changes are seen with sulcal and ventricular prominence. There is chronic periventricular white matter disease with lacunae throughout   the corona radiata and centrum semiovale. No evidence of restricted diffusion   is seen to suggest acute ischemia. There are no abnormal extra-axial fluid collections. No evidence of mass or mass   effect is seen.   Expected flow voids are maintained in the major tissues at these   levels, with a rim-enhancing fluid collection in the proximal left psoas muscle   at the level of the L2-L3 disc space measuring 1.9 x 1.1 x 3.2 cm (series 10,   image 17, series 9, image 17). No other abnormal osseous signal. Advanced multilevel degenerative disease and   facet arthropathy at the remaining lumbar levels is similar to prior with   moderate spinal canal narrowing at L1-L2 and L4-L5 and multilevel bilateral   neural foraminal narrowing. Impression       1. Sequela of old L2 and L4 kyphoplasty is with interval development of   discitis/osteomyelitis extending from L2 to L4 with prominent paravertebral   phlegmonous change, including a small left psoas muscle abscess. 2. Posterior epidural phlegmonous change from L2-L4 with a small posterior   epidural abscess at the level of L2-L3. This superimposed on the preexisting   degenerative changes results in severe spinal canal narrowing at L2-L3 and   L3-L4, which is slightly worsened from prior MRI. 3. Multilevel degenerative changes of the remainder of the lumbar spine with   multilevel spinal canal and neural foraminal narrowing. Labs: Results:       Chemistry Recent Labs     08/07/22  0300   *   K 4.2   CL 93*   CO2 34*   BUN 14      CBC w/Diff Recent Labs     08/07/22  0300   WBC 9.0   RBC 2.79*   HGB 8.8*   HCT 26.9*         Cardiac Enzymes No results for input(s): CPK, DELISA in the last 72 hours. Invalid input(s): CKRMB, CKND1, TROIP   Coagulation No results for input(s): INR, APTT in the last 72 hours. Invalid input(s): PTP    Lipid Panel Lab Results   Component Value Date/Time    CHOL 77 03/05/2022 06:14 AM    HDL 45 03/05/2022 06:14 AM    VLDL 18 09/21/2021 02:54 PM      BNP Invalid input(s): BNPP   Liver Enzymes No results for input(s): TP, ALB in the last 72 hours.     Invalid input(s): TBIL, AP, SGOT, GPT, DBIL   Thyroid Studies Lab Results   Component Value Date/Time TSH 2.600 05/18/2022 11:28 AM            Discharge Exam:  BP (!) 177/71   Pulse 73   Temp 98.4 °F (36.9 °C) (Axillary)   Resp 20   Ht 6' 1\" (1.854 m)   Wt 218 lb 7.6 oz (99.1 kg)   SpO2 91%   BMI 28.82 kg/m²   General appearance: Elderly, awake, restless, on nasal CPAP. Lungs: clear to auscultation bilaterally  Heart: regular rate and rhythm, S1, S2 normal, no murmur, click, rub or gallop  Abdomen: soft, non-tender. Bowel sounds normal. No masses,  no organomegaly  Extremities: no cyanosis or edema  Neurologic: GCS 15, cranial nerves intact, moving all 4 extremities spontaneously, fidgety and restless. Psych: Oriented to self, confused    Disposition: long term care facility  Discharge Condition: stable  Patient Instructions:      Medication List        START taking these medications      amLODIPine 5 MG tablet  Commonly known as: NORVASC  Take 1 tablet by mouth in the morning. Start taking on: August 9, 2022     bisacodyl 10 MG suppository  Commonly known as: DULCOLAX  Place 1 suppository rectally daily as needed for Constipation     lidocaine 4 % external patch  Place 1 patch onto the skin in the morning for 14 days. Start taking on: August 9, 2022     OLANZapine zydis 5 MG disintegrating tablet  Commonly known as: ZYPREXA  Take 1 tablet by mouth nightly     pantoprazole 40 MG tablet  Commonly known as: PROTONIX  Take 1 tablet by mouth in the morning and 1 tablet in the evening. Take before meals. QUEtiapine 25 MG tablet  Commonly known as: SEROQUEL  Take 1 tablet by mouth in the morning. Start taking on: August 9, 2022     tamsulosin 0.4 MG capsule  Commonly known as: FLOMAX  Take 1 capsule by mouth in the morning. Start taking on: August 9, 2022            CHANGE how you take these medications      furosemide 20 MG tablet  Commonly known as: LASIX  Take 1 tablet by mouth in the morning.   Start taking on: August 9, 2022  What changed:   medication strength  additional instructions rosuvastatin 10 MG tablet  Commonly known as: CRESTOR  TAKE 1 TABLET BY MOUTH EVERY DAY  What changed: See the new instructions. CONTINUE taking these medications      albuterol sulfate  (90 Base) MCG/ACT inhaler  Commonly known as: PROVENTIL;VENTOLIN;PROAIR     amiodarone 200 MG tablet  Commonly known as: CORDARONE     apixaban 5 MG Tabs tablet  Commonly known as: ELIQUIS     ascorbic acid 500 MG tablet  Commonly known as: VITAMIN C     calcitonin 200 UNIT/ACT nasal spray  Commonly known as: Miacalcin  1 spray by Nasal route daily     carvedilol 25 MG tablet  Commonly known as: COREG     Cholecalciferol 50 MCG (2000 UT) Tabs     clopidogrel 75 MG tablet  Commonly known as: PLAVIX     fluticasone 50 MCG/ACT nasal spray  Commonly known as: FLONASE     fluticasone-salmeterol 250-50 MCG/ACT Aepb diskus inhaler  Commonly known as: ADVAIR     glucosamine-chondroitin 750-600 MG Tabs tablet     guaiFENesin 1200 MG Tb12     latanoprost 0.005 % ophthalmic solution  Commonly known as: XALATAN     magnesium oxide 400 (240 Mg) MG tablet  Commonly known as: MAG-OX     montelukast 10 MG tablet  Commonly known as: SINGULAIR     polyethylene glycol 17 GM/SCOOP powder  Commonly known as: GLYCOLAX     SITagliptin 100 MG tablet  Commonly known as: JANUVIA     tiZANidine 2 MG tablet  Commonly known as: ZANAFLEX  Take 1 tablet by mouth every 8 hours as needed (muscle spasms)            STOP taking these medications      metFORMIN 500 MG tablet  Commonly known as: GLUCOPHAGE            ASK your doctor about these medications      glipiZIDE 10 MG extended release tablet  Commonly known as: GLUCOTROL XL     HYDROcodone-acetaminophen 7.5-325 MG per tablet  Commonly known as: Norco  Take 1 tablet by mouth every 6 hours as needed for Pain for up to 7 days. Intended supply: 7 days.  Take lowest dose possible to manage pain  Ask about: Should I take this medication?     nitroGLYCERIN 0.4 MG SL tablet  Commonly known as: NITROSTAT     valsartan 320 MG tablet  Commonly known as: DIOVAN               Where to Get Your Medications        These medications were sent to [address] [place], [place] [address]ther [facility], [address]  [address] [facility] [address] [address]      Phone: [phone]   rosuvastatin 10 MG tablet       Information about where to get these medications is not yet available    Ask your nurse or doctor about these medications  amLODIPine 5 MG tablet  bisacodyl 10 MG suppository  furosemide 20 MG tablet  lidocaine 4 % external patch  OLANZapine zydis 5 MG disintegrating tablet  pantoprazole 40 MG tablet  QUEtiapine 25 MG tablet  tamsulosin 0.4 MG capsule         Activity: activity as tolerated  Diet: Diet:  ADULT DIET; Regular; 3 carb choices (45 gm/meal);  Low Fat/Low Chol/High Fiber/ANGELA  ADULT ORAL NUTRITION SUPPLEMENT; Breakfast, Lunch, Dinner; Diabetic Oral Supplement  ADULT ORAL NUTRITION SUPPLEMENT; Lunch, Dinner; Frozen Oral Supplement  Wound Care: as directed    Follow-up  Follow-up with physician at [facility]  Time spent to discharge patient 35 minutes  Signed:  Yoli Guajardo MD  8/8/2022  10:55 AM

## 2022-08-08 NOTE — CARE COORDINATION
Pt to d/c today to [facility]. Room: 411. Report: ext. [phone]. Transport via in-house transfer. No other supportive care needs identified. Pt's spouse agrees with d/c plan. Milestones met.       07/27/22 2739   Service Assessment   Patient Orientation Alert and Oriented;Person;Place; Self   Cognition Alert   History Provided By Medical Record   Primary Caregiver Self   Support Systems Spouse/Significant Other;Children   Patient's Healthcare Decision Maker is: Legal Next of Kin   PCP Verified by CM Yes  Suraj Elliott MD)   Last Visit to PCP Within last 3 months   Prior Functional Level Independent in ADLs/IADLs   Current Functional Level Assistance with the following:;Bathing; Toileting;Dressing; Mobility   Can patient return to prior living arrangement Yes   Ability to make needs known: Good   Family able to assist with home care needs: Yes   Would you like for me to discuss the discharge plan with any other family members/significant others, and if so, who? No   Financial Resources Other (Comment); Medicare  ([employer])   Social/Functional History   Lives With Spouse   Type of [address] Two level   Bathroom Toilet Standard   Bathroom Equipment Toilet raiser;Grab bars in [place] [address];[place], [address]   Ambulation Assistance Needs assistance   Transfer Assistance Needs assistance   Active  Yes   Mode of Transportation Car   Occupation Retired   Discharge Planning   Type of [address] [facility]  ([facility])   Living Arrangements Spouse/Significant Other   Current Services Prior To Admission C-pap   Potential Assistance Needed N/A   DME Ordered?  No   Potential Assistance Purchasing Medications No   Type of Home Care Services None   Patient expects to be discharged to: LTAC   One/Two Story Residence Two story, [facility] floor   History of falls? 1   Services At/After Discharge   Transition of Care Consult (CM Consult) Discharge Planning;LTAC   Services At/After Discharge None   [employer] Information Provided? No   Mode of Transport at Discharge Other (see comment)  [facility] in-house transfer)   Confirm Follow Up Transport Other (see comment)  [facility] in-house transfer)   Condition of Participation: Discharge Planning   The Plan for Transition of Care is related to the following treatment goals: Pt requires LTAC to return to functional baseline in the community. The Patient and/or Patient Representative was provided with a Choice of Provider? Patient Representative   Name of the Patient Representative who was provided with the Choice of Provider and agrees with the Discharge Plan? Lu \"Domenica\" Sunshine Pennington (spouse)   The Patient and/Or Patient Representative agree with the Discharge Plan? Yes   Freedom of Choice list was provided with basic dialogue that supports the patient's individualized plan of care/goals, treatment preferences, and shares the quality data associated with the providers?   Yes

## 2022-08-08 NOTE — CARE COORDINATION
CM received a call from the [facility] Erika Youssef requesting an updated MAR and progress notes be faxed to their office. Darlin Melissa stated that [facility] plans to admit pt today. CM faxed requested information. CM will continue to follow.

## 2022-08-08 NOTE — PROGRESS NOTES
VANCO DAILY FOLLOW UP NOTE  [address] [facility] Pharmacokinetic Monitoring Service - Vancomycin    Consulting Provider: Juanjo Powers   Indication: L2-L4 osteodiscitis and psoas abscess  Target Concentration: Goal AUC/GIOVANNY 400-600 mg*hr/L  Day of Therapy: anticipate 6 weeks per ID  Additional Antimicrobials: ceftriaxone    Pertinent Laboratory Values: Wt Readings from Last 1 Encounters:   08/07/22 218 lb 7.6 oz (99.1 kg)     Temp Readings from Last 1 Encounters:   08/08/22 98.4 °F (36.9 °C) (Axillary)     Recent Labs     08/07/22  0300   BUN 14   CREATININE 0.77*   WBC 9.0     Estimated Creatinine Clearance: 98 mL/min (A) (based on SCr of 0.77 mg/dL (L)). Lab Results   Component Value Date/Time    VANCORANDOM 28.3 08/07/2022 03:00 AM       MRSA Nasal Swab: N/A. Non-respiratory infection. .      Assessment:  Date/Time Dose Concentration AUC   8/3 0602 1250 mg q 12 hours 15.2 493   8/7 0600 1250 mg q 12 hours 28.3 608   Note: Serum concentrations collected for AUC dosing may appear elevated if collected in close proximity to the dose administered, this is not necessarily an indication of toxicity    Plan:  Continue 1000 mg q12h  Repeat vancomycin concentrations will be ordered as clinically appropriate   Pharmacy will continue to monitor patient and adjust therapy as indicated    Thank you for the consult,  Simran Villanueva, PharmD, BCOP  Clinical Pharmacist  Contact via [employer]

## 2022-08-09 ENCOUNTER — CARE COORDINATION (OUTPATIENT)
Dept: CARE COORDINATION | Facility: CLINIC | Age: 77
End: 2022-08-09

## 2022-08-09 NOTE — TELEPHONE ENCOUNTER
Spoke with pt's wife - she was calling back about a letter she received from our office - we had tried to reach the patient to get him set up for physical therapy but was unable to get in touch with the patient ( a letter was mailed)   He is currently in the hospital and will be transferred to rehab for therapy.

## 2022-08-26 ENCOUNTER — HOSPITAL ENCOUNTER (INPATIENT)
Age: 77
LOS: 12 days | Discharge: ANOTHER HOSPITAL | DRG: 094 | End: 2022-09-07
Attending: PHYSICAL MEDICINE & REHABILITATION | Admitting: PHYSICAL MEDICINE & REHABILITATION
Payer: MEDICARE

## 2022-08-26 PROBLEM — R53.81 PHYSICAL DEBILITY: Status: ACTIVE | Noted: 2022-08-26

## 2022-08-26 PROBLEM — N39.0 UTI (URINARY TRACT INFECTION): Status: RESOLVED | Noted: 2022-07-27 | Resolved: 2022-08-26

## 2022-08-26 LAB
GLUCOSE BLD STRIP.AUTO-MCNC: 181 MG/DL (ref 65–100)
GLUCOSE BLD STRIP.AUTO-MCNC: 202 MG/DL (ref 65–100)
SERVICE CMNT-IMP: ABNORMAL
SERVICE CMNT-IMP: ABNORMAL

## 2022-08-26 PROCEDURE — 6360000002 HC RX W HCPCS: Performed by: PHYSICAL MEDICINE & REHABILITATION

## 2022-08-26 PROCEDURE — 97116 GAIT TRAINING THERAPY: CPT

## 2022-08-26 PROCEDURE — 97166 OT EVAL MOD COMPLEX 45 MIN: CPT

## 2022-08-26 PROCEDURE — 97530 THERAPEUTIC ACTIVITIES: CPT

## 2022-08-26 PROCEDURE — 82962 GLUCOSE BLOOD TEST: CPT

## 2022-08-26 PROCEDURE — 99222 1ST HOSP IP/OBS MODERATE 55: CPT | Performed by: PHYSICAL MEDICINE & REHABILITATION

## 2022-08-26 PROCEDURE — 97162 PT EVAL MOD COMPLEX 30 MIN: CPT

## 2022-08-26 PROCEDURE — 97535 SELF CARE MNGMENT TRAINING: CPT

## 2022-08-26 PROCEDURE — 6370000000 HC RX 637 (ALT 250 FOR IP): Performed by: PHYSICAL MEDICINE & REHABILITATION

## 2022-08-26 PROCEDURE — 2580000003 HC RX 258: Performed by: PHYSICAL MEDICINE & REHABILITATION

## 2022-08-26 PROCEDURE — 94760 N-INVAS EAR/PLS OXIMETRY 1: CPT

## 2022-08-26 PROCEDURE — 94640 AIRWAY INHALATION TREATMENT: CPT

## 2022-08-26 PROCEDURE — 1180000000 HC REHAB R&B

## 2022-08-26 RX ORDER — CLOPIDOGREL BISULFATE 75 MG/1
75 TABLET ORAL DAILY
Status: DISCONTINUED | OUTPATIENT
Start: 2022-08-26 | End: 2022-09-07 | Stop reason: HOSPADM

## 2022-08-26 RX ORDER — FLUTICASONE PROPIONATE AND SALMETEROL 100; 50 UG/1; UG/1
1 POWDER RESPIRATORY (INHALATION) 2 TIMES DAILY
Status: DISCONTINUED | OUTPATIENT
Start: 2022-08-26 | End: 2022-08-26 | Stop reason: CLARIF

## 2022-08-26 RX ORDER — ATORVASTATIN CALCIUM 10 MG/1
20 TABLET, FILM COATED ORAL NIGHTLY
Status: DISCONTINUED | OUTPATIENT
Start: 2022-08-26 | End: 2022-09-07 | Stop reason: HOSPADM

## 2022-08-26 RX ORDER — AMIODARONE HYDROCHLORIDE 200 MG/1
200 TABLET ORAL DAILY
Status: DISCONTINUED | OUTPATIENT
Start: 2022-08-26 | End: 2022-09-07 | Stop reason: HOSPADM

## 2022-08-26 RX ORDER — LOSARTAN POTASSIUM 50 MG/1
100 TABLET ORAL DAILY
Status: DISCONTINUED | OUTPATIENT
Start: 2022-08-26 | End: 2022-09-07 | Stop reason: HOSPADM

## 2022-08-26 RX ORDER — MONTELUKAST SODIUM 10 MG/1
10 TABLET ORAL NIGHTLY
Status: DISCONTINUED | OUTPATIENT
Start: 2022-08-26 | End: 2022-09-07 | Stop reason: HOSPADM

## 2022-08-26 RX ORDER — POLYETHYLENE GLYCOL 3350 17 G/17G
17 POWDER, FOR SOLUTION ORAL DAILY
Status: DISCONTINUED | OUTPATIENT
Start: 2022-08-26 | End: 2022-09-07 | Stop reason: HOSPADM

## 2022-08-26 RX ORDER — FUROSEMIDE 40 MG/1
40 TABLET ORAL DAILY
Status: DISCONTINUED | OUTPATIENT
Start: 2022-08-26 | End: 2022-08-30 | Stop reason: SDUPTHER

## 2022-08-26 RX ORDER — LIDOCAINE 4 G/G
1 PATCH TOPICAL DAILY
Status: DISCONTINUED | OUTPATIENT
Start: 2022-08-26 | End: 2022-09-07 | Stop reason: HOSPADM

## 2022-08-26 RX ORDER — FLUTICASONE PROPIONATE 50 MCG
1 SPRAY, SUSPENSION (ML) NASAL DAILY
Status: DISCONTINUED | OUTPATIENT
Start: 2022-08-26 | End: 2022-09-07 | Stop reason: HOSPADM

## 2022-08-26 RX ORDER — PANTOPRAZOLE SODIUM 40 MG/1
40 TABLET, DELAYED RELEASE ORAL
Status: DISCONTINUED | OUTPATIENT
Start: 2022-08-26 | End: 2022-09-07 | Stop reason: HOSPADM

## 2022-08-26 RX ORDER — LATANOPROST 50 UG/ML
1 SOLUTION/ DROPS OPHTHALMIC NIGHTLY
Status: DISCONTINUED | OUTPATIENT
Start: 2022-08-26 | End: 2022-09-07 | Stop reason: HOSPADM

## 2022-08-26 RX ORDER — INSULIN GLARGINE 100 [IU]/ML
14 INJECTION, SOLUTION SUBCUTANEOUS NIGHTLY
Status: DISCONTINUED | OUTPATIENT
Start: 2022-08-26 | End: 2022-09-07 | Stop reason: HOSPADM

## 2022-08-26 RX ORDER — HYDROXYZINE PAMOATE 25 MG/1
25 CAPSULE ORAL NIGHTLY
Status: DISCONTINUED | OUTPATIENT
Start: 2022-08-26 | End: 2022-08-29

## 2022-08-26 RX ORDER — AMLODIPINE BESYLATE 5 MG/1
5 TABLET ORAL DAILY
Status: DISCONTINUED | OUTPATIENT
Start: 2022-08-26 | End: 2022-09-01

## 2022-08-26 RX ORDER — BISACODYL 10 MG
10 SUPPOSITORY, RECTAL RECTAL DAILY PRN
Status: DISCONTINUED | OUTPATIENT
Start: 2022-08-26 | End: 2022-09-07 | Stop reason: HOSPADM

## 2022-08-26 RX ORDER — LANOLIN ALCOHOL/MO/W.PET/CERES
6 CREAM (GRAM) TOPICAL NIGHTLY
Status: DISCONTINUED | OUTPATIENT
Start: 2022-08-26 | End: 2022-09-07 | Stop reason: HOSPADM

## 2022-08-26 RX ORDER — NAPROXEN 250 MG/1
500 TABLET ORAL 2 TIMES DAILY WITH MEALS
Status: DISCONTINUED | OUTPATIENT
Start: 2022-08-26 | End: 2022-09-07 | Stop reason: HOSPADM

## 2022-08-26 RX ORDER — SENNA AND DOCUSATE SODIUM 50; 8.6 MG/1; MG/1
2 TABLET, FILM COATED ORAL DAILY PRN
Status: DISCONTINUED | OUTPATIENT
Start: 2022-08-26 | End: 2022-09-01

## 2022-08-26 RX ORDER — INSULIN LISPRO 100 [IU]/ML
0-4 INJECTION, SOLUTION INTRAVENOUS; SUBCUTANEOUS NIGHTLY
Status: DISCONTINUED | OUTPATIENT
Start: 2022-08-26 | End: 2022-09-07 | Stop reason: HOSPADM

## 2022-08-26 RX ORDER — OLANZAPINE 5 MG/1
2.5 TABLET ORAL NIGHTLY
Status: COMPLETED | OUTPATIENT
Start: 2022-08-26 | End: 2022-08-27

## 2022-08-26 RX ORDER — CARVEDILOL 25 MG/1
25 TABLET ORAL 2 TIMES DAILY WITH MEALS
Status: DISCONTINUED | OUTPATIENT
Start: 2022-08-26 | End: 2022-08-28

## 2022-08-26 RX ORDER — GUAIFENESIN 600 MG/1
1200 TABLET, EXTENDED RELEASE ORAL 2 TIMES DAILY
Status: DISCONTINUED | OUTPATIENT
Start: 2022-08-26 | End: 2022-09-07 | Stop reason: HOSPADM

## 2022-08-26 RX ORDER — CALCITONIN SALMON 200 [IU]/.09ML
1 SPRAY, METERED NASAL DAILY
Status: DISCONTINUED | OUTPATIENT
Start: 2022-08-26 | End: 2022-09-07 | Stop reason: HOSPADM

## 2022-08-26 RX ORDER — INSULIN LISPRO 100 [IU]/ML
0-16 INJECTION, SOLUTION INTRAVENOUS; SUBCUTANEOUS
Status: DISCONTINUED | OUTPATIENT
Start: 2022-08-26 | End: 2022-09-07 | Stop reason: HOSPADM

## 2022-08-26 RX ORDER — ALBUTEROL SULFATE 90 UG/1
2 AEROSOL, METERED RESPIRATORY (INHALATION) EVERY 4 HOURS PRN
Status: DISCONTINUED | OUTPATIENT
Start: 2022-08-26 | End: 2022-08-28

## 2022-08-26 RX ORDER — DEXTROSE MONOHYDRATE 100 MG/ML
INJECTION, SOLUTION INTRAVENOUS CONTINUOUS PRN
Status: DISCONTINUED | OUTPATIENT
Start: 2022-08-26 | End: 2022-09-07 | Stop reason: HOSPADM

## 2022-08-26 RX ADMIN — PANTOPRAZOLE SODIUM 40 MG: 40 TABLET, DELAYED RELEASE ORAL at 16:32

## 2022-08-26 RX ADMIN — INSULIN GLARGINE 14 UNITS: 100 INJECTION, SOLUTION SUBCUTANEOUS at 21:33

## 2022-08-26 RX ADMIN — CARVEDILOL 25 MG: 25 TABLET, FILM COATED ORAL at 16:32

## 2022-08-26 RX ADMIN — APIXABAN 5 MG: 5 TABLET, FILM COATED ORAL at 21:12

## 2022-08-26 RX ADMIN — Medication 6 MG: at 21:25

## 2022-08-26 RX ADMIN — LATANOPROST 1 DROP: 50 SOLUTION OPHTHALMIC at 21:26

## 2022-08-26 RX ADMIN — NAPROXEN 500 MG: 250 TABLET ORAL at 16:32

## 2022-08-26 RX ADMIN — ATORVASTATIN CALCIUM 20 MG: 20 TABLET, FILM COATED ORAL at 21:12

## 2022-08-26 RX ADMIN — VANCOMYCIN HYDROCHLORIDE 1000 MG: 1 INJECTION, POWDER, LYOPHILIZED, FOR SOLUTION INTRAVENOUS at 18:02

## 2022-08-26 RX ADMIN — CEFTRIAXONE 2000 MG: 2 INJECTION, POWDER, FOR SOLUTION INTRAMUSCULAR; INTRAVENOUS at 15:58

## 2022-08-26 RX ADMIN — MOMETASONE FUROATE AND FORMOTEROL FUMARATE DIHYDRATE 2 PUFF: 100; 5 AEROSOL RESPIRATORY (INHALATION) at 17:35

## 2022-08-26 RX ADMIN — GUAIFENESIN 1200 MG: 600 TABLET ORAL at 21:12

## 2022-08-26 RX ADMIN — HYDROXYZINE PAMOATE 25 MG: 25 CAPSULE ORAL at 21:12

## 2022-08-26 RX ADMIN — OLANZAPINE 2.5 MG: 5 TABLET, FILM COATED ORAL at 21:12

## 2022-08-26 RX ADMIN — MONTELUKAST 10 MG: 10 TABLET, FILM COATED ORAL at 21:12

## 2022-08-26 NOTE — CARE COORDINATION
NIKKON, JEANETTE met with pt at bedside wearing the appropriate PPE with social distance. Pt sitting in bedside chair. Pt alert and oriented to person, place, time, and situation. Pt verified demographic information. PCP verified as Dr. Pearl Kiser and pt was last seen by PCP about a month ago. Pt lives in 2 story home with spouse, 4 steps to enter into the home and 15 steps to second floor bedroom. Pt has room and full tub/shower bathroom on main level. Pt reports being independent with ADLs and driving prior to admission. Pt reports having DMEs such as CPAP (unknown provider), cane, RW, grab bars, toilet riser, and wheelchair (loaner). Pt family able to transport home, to doctor apt,  medications, and get groceries. Pt primary pharmacy is [facility] in [place]. Pt reports being able to afford medications. Pt reports availability for family to be at home with pt 24 hours as needed as spouse is home. Pt does report family in and out of home regularly with 2 granddaughters (15 y/o) in home often. Pt reports plans to discharge home. Pt has used HH twice in past but cannot recall company. Pt has never been to SNF. Pt at [facility] during this current hospital stay but does not anticipate returning. Pt aware of Wednesday Team meetings and would like family updated about meeting. Pt on IV ABT till 9/9/22. CM to continue to follow and monitor for any needs that may occur.        08/26/22 1330   Service Assessment   Patient Orientation Alert and Oriented;Person;Place;Situation   Cognition Alert   History Provided By Patient;Medical Record   Primary Caregiver Self   Support Systems Spouse/Significant Other   PCP Verified by CM Yes  (Dr. Pearl Kiser)   Last Visit to PCP Within last 3 months   Prior Functional Level Independent in ADLs/IADLs   Current Functional Level Assistance with the following:;Mobility   Can patient return to prior living arrangement Yes   Ability to make needs known: Good   Family able to assist with home care needs: Yes   Would you like for me to discuss the discharge plan with any other family members/significant others, and if so, who? Yes  (spouse,  Lu)   Financial Resources Medicare   Social/Functional History   Lives With Spouse   Type of [address] Two level   Entrance Stairs - Number of Steps 4   Bathroom Equipment Toilet raiser;Grab bars in [address] Cane;Walker, rolling; moziy Help From Family   ADL Assistance Independent   Homemaking Assistance Independent   Ambulation Assistance Independent   Transfer Assistance Independent   Active  Yes   Occupation Retired   Discharge Planning   Type of [facility] Spouse/Significant Other   Current Services Prior To Admission C-pap;Durable [address]   DME Walker;Cane;Wheelchair;Cpap;Glucometer   DME Ordered? No   Potential Assistance Purchasing Medications No   Type of Home Care Services None   Patient expects to be discharged to: House   One/Two Story Residence Two story   # of Interior Steps 15   Lift Chair Available No   History of falls? 1   Services At/After Discharge   Transition of Care Consult (CM Consult) Discharge Planning;Home Health;DME/Supply Assistance   [employer] Information Provided?  No   Mode of Transport at Discharge Other (see comment)  (family)   Confirm Follow Up Transport Family   Condition of Participation: Discharge Planning   The Plan for Transition of Care is related to the following treatment goals: Return home with spouse

## 2022-08-26 NOTE — PROGRESS NOTES
VANCO DAILY FOLLOW UP NOTE  [facility] Pharmacokinetic Monitoring Service - Vancomycin    Consulting Provider: Trell Joshi MD   Indication: Osteomyelitis  Target Concentration: Goal AUC/GIOVANNY 400-600 mg*hr/L  Day of Therapy: EOT 9/9/22  Additional Antimicrobials: ceftriaxone    Pertinent Laboratory Values: Wt Readings from Last 1 Encounters:   08/07/22 218 lb 7.6 oz (99.1 kg)     Temp Readings from Last 1 Encounters:   08/26/22 98.2 °F (36.8 °C) (Oral)     Recent Labs     08/25/22  1911   BUN 16   CREATININE 1.00     CrCl cannot be calculated (Unknown ideal weight.). Lab Results   Component Value Date/Time    VANCOTROUGH 18.2 08/26/2022 07:30 AM    VANCORANDOM 14.0 08/20/2022 06:48 AM       MRSA Nasal Swab: N/A. Non-respiratory infection. Assessment:  Date/Time Dose Concentration AUC   8/26 0730 1g q12h Tr = 18.2 [place]   Note: Serum concentrations collected for AUC dosing may appear elevated if collected in close proximity to the dose administered, this is not necessarily an indication of toxicity    Plan:  Continuation of dosing regimen from [facility].  1g q12h. Trough level at 0730 on 8/26 = 18.2.     Will convert to AUC dosing  Repeat vancomycin concentrations will be ordered as clinically appropriate   Pharmacy will continue to monitor patient and adjust therapy as indicated    Thank you for the consult,  Lynette Regalado, PharmD, BCOP  Clinical Pharmacist  Contact [employer]

## 2022-08-26 NOTE — PLAN OF CARE
Problem: Safety - Adult  Goal: Free from fall injury  Outcome: Progressing  Flowsheets (Taken 8/26/2022 1242)  Free From Fall Injury: Instruct family/caregiver on patient safety     Problem: ABCDS Injury Assessment  Goal: Absence of physical injury  Outcome: Progressing  Flowsheets (Taken 8/26/2022 1242)  Absence of Physical Injury: Implement safety measures based on patient assessment

## 2022-08-26 NOTE — PROGRESS NOTES
Patient to room 910 from [facility]. VSS. Patient oriented to room and call light. Patient educated to call with call light before getting out of bed/chair, patient verbalized understanding. All needs assessed at this time.

## 2022-08-26 NOTE — PROGRESS NOTES
[facility] OCCUPATIONAL THERAPY INITIAL EVALUATION    Time In: 13   Time Out: 1306    Precautions: Falls    Pain:  Pt had a sharp pain of 5/10 when going from sit to stand. No c/o pain in sitting. History of Presenting Illness (per previous reports): Mehdi Khanna is a 65 y.o. male with medical history of ICM with ICD/PPM, EF 30-35% afib on eliquis, DM2, obesity, PAD, anemia, COPD/ asthma, CAD, SHERI on CPAP with recent L2-4 kyphoplasty evaluated with diffuse  bilateral LE weakness. He states this has been worse since his kyphoplasty and he has had several falls. Lives with wife Andrew Herbert. Seen at ortho spine today and ordered MRI Lspine to evaluate ongoing pain and weakness. Did have negative bone biopsy with kyphoplasty. Felt worse and not better post procedure. Unable to ambulate well, using walker, has trouble getting up from seated position. No LE paresthesia and no urine issues excluding some issues starting stream. Had some loose BM and can't make it to the bathroom in time. No fever. No dysuria. Urine is darker. Has ICD/PPM- will need clearance for MRI. CT Lspine shows \"  Postoperative changes from kyphoplasty procedure at L2 and L4. No lumbar spine   fracture or malalignment aside from the compression deformities. Multiple levels   of probable spinal canal stenosis as described on recent MRI. \"  UA positive. urine culture showing  more than 50k gram-negative rods and pan sensitive. He was treated with Rocephin empirically. CXR with improved infiltrates. COVID negative. MRI brain no acute infarct. MRI lumbar and cervical spine showed epidural, psoas abscess and osteomyelitis. CRP is elevated 12.4. His Eliquis and Plavix were stopped. He was on heparin drip. Antibiotics were discontinued and IR was consulted for draining the abscess. Blood cultures were negative. ID was consulted and may need to 6 weeks of antibiotics treatment. IR guided drainage of the psoas abscess was performed.   Culture results Osteoarthritis 11/10/2015    Peripheral vascular disease ([facility] [address] 11/10/2015    PNA (pneumonia) 02/08/2019    PVC (premature ventricular contraction)     Rash 09/87/9870    Seroma complicating a procedure 10/02/2014    Sleep apnea     cpap    Toe laceration     Type 2 diabetes with nephropathy ([facility] [address]     Uncontrolled type 2 diabetes mellitus ([facility] [address] 11/10/2015    Vertiginous syndromes and other disorders of vestibular system 11/10/2015       Patient's Goal: \"Hopefully get back as good as I ever was\"    Previous Level of Function: Prior to back sx pt was independent with ADL and driving and medication management.      [facility]   Home Entrance 2 Stairs   Lives Alone No   Support Systems Spouse/Significant Other (available 24/7) and many grandchildren   Bathing Situation Shower, Grab Bars, Shower Chair, and Door   Current DME Rolling Walker, Rollator, Wheelchair , Cane, and Bedside Commode     Upper Extremity Function   CEDRIC FLETCHER   General Evaluation Appeared to have some decreased coordination [facility]   Fine Motor Coordination Intact Intact   Gross Motor Coordination Impaired Intact     Functional Mobility   Static Dynamic   Sitting Intact Good-/Fair+: Able to sit unsupported and weight shift across midline minimally   Standing Fair-: Requires min A or UE support in order to stand without a LOB Poor: Able to stand with mod A and minimally reach ipsilaterally, unable to cross midline      Score Comments   Sit to Stand Partial/moderate assistance Light lifting A     Activities of Daily Living    Score Comments   Eating Independent I   Oral Hygiene Setup or clean-up assistance S/U   Bathing Partial/moderate assistance A for buttocks   Upper Body  Dressing Setup or clean-up assistance S/U   Lower Body Dressing Partial/moderate assistance A to pull over waist   Donning/[place] Footwear Partial/moderate assistance A for shoes   Toilet Transfer Partial/moderate assistance A for hygiene and pants up   [address] assistance Lifting A     Cognition: Needs to be evaluated    Vision/Perception: No deficits noted    Session: Pt was in recliner and agreeable to session. Pt's performance with ADL is reflected in above chart. Pt was left in recliner talking to Dr. Con Nash. Interdisciplinary Communication: PT Barbara Rodriguez on pt's performance     Patient/Family Education: Patient, Family, and Caregivers was/were educated On the role of OT, On POC, and On IRC expectations. Problem List: GMC, Activity Tolerance, Safety Awareness, Standing Balance, and Cognition    Functional Limitations: ADL, IADL, Functional Transfers, and Functional Mobility    Goals:   STG 1: Pt will be touching A with toileting by 9/2/22 to prevent skin breakdown. LTG 1: Pt will be independent with toileting by 9/9/22 to prevent skin breakdown. STG 2: Pt will be supervision  with LB dressing by 9/2/22 to reduce risk of falls. LTG 2: Pt will be independent with LB dressing by 9/9/22 to reduce risk of falls. STG 3: Pt will be supervision  with bathing by 9/2/22 to promote good skin integrity. LTG 3: Pt will be independent with bathing by 9/9/22 to promote good skin integrity. STG 4: Pt will be able to get a snack from the fridge with touching A by 9/2/22 to promote proper nutrition and hydration. LTG 4: Pt will be able to get a snack from the fridge with independent by 9/9/22 to promote proper nutrition and hydration. LTG 5: Pt/caregiver will verbalize  understanding of OT recommendations regarding ADL status, functional transfer status, home safety, DME, AE, energy conservation techniques, safety awareness, activity tolerance, and/or follow-up therapy to increase safety with functional tasks upon discharge. OT order received and chart reviewed. OT orders have been acknowledged.  Patient will benefit from skilled OT services to address ADL, functional transfers, UE coordination, balance, cognition, and activity tolerance to maximize functional performance with daily self-care tasks and functional mobility. Patient will be seen for 1.5-2 hours of skilled OT services 5-6 days a week as appropriate. Initate POC.      Kassi Palomino OT   8/26/2022

## 2022-08-26 NOTE — H&P
[facility]  Jaky Gonzalez MD  Medical Director  [place], [address] [facility]  Tel: [phone]    Admission History and Physical Exam  [address] Date: 8/26/2022  Surgeon: Dr Hermann Lundberg  Primary Care Provider: Rosalva West MD  Specialty Group / Referring Service: [facility]    Chief Complaint : \" I just want to walk and care for myself\"  Admitting Diagnosis:   Physical debility [R53.81]    Principal Problem:    Physical debility  Resolved Problems:    * No resolved hospital problems.  *  ICM with ICD/PPM EF 30-35%  Compression fxs s/p kyphoplasty  Electrolye dysfxn  Acute encephalopathy   Afib on eliquis  ICM/EF 30% on chronic CHF  DM2  COPD  E coli UTI  MINI   SHERI on CPAP  COPD  PAD  Anemia  Recent kyphoplasty L2-4  Acute hypoxic respiratory failure       Acute Rehab Dx:  Gait impairment / gait dysfunction  Debility  Deconditioning  Mobility and ambulation deficits  Self care / ADL deficits    Medical Dx:  Past Medical History:   Diagnosis Date    Acute respiratory failure with hypoxia ([address] 10/23/2014    MINI (acute kidney injury) ([address] 09/15/2014    MINI (acute kidney injury) ([address]     MINI (acute kidney injury) ([address]     Allergic rhinitis 11/10/2015    Asthma 11/10/2015    Benign essential hypertension 11/10/2015    Benign neoplasm of colon 11/10/2015    CAD (coronary artery disease) 11/10/2015    CAD (coronary artery disease) 1998, 1999    mix2, 3 stents [id number]    CAP (community acquired pneumonia) 12/31/2020    Cardiomyopathy ([address] 92/36/8447    Complicated wound infection 11/10/2015    COPD (chronic obstructive pulmonary disease) with emphysema ([address] 11/10/2015    COPD exacerbation ([address] 10/12/2011    COVID-19 12/31/2020    Diabetes mellitus type 2, controlled ([address] 11/10/2015    Diabetic neuropathy ([address] 11/10/2015    Disruption of wound, unspecified 10/09/2014    Encounter for long-term (current) use of other high-risk medications 11/10/2015    Extremity atherosclerosis with intermittent claudication ([address] 11/10/2015    H/O endarterectomy 11/10/2015    Hiatal hernia 11/10/2015    History of 2019 novel coronavirus disease (COVID-19)     12/31/2020-    Hyperglycemia due to type 1 diabetes mellitus ([address] 11/10/2015    Hyperlipidemia 11/10/2015    ICD (implantable cardioverter-defibrillator) in place 06/16/2022    Monomorphic ventricular tachycardia ([address] 12/31/2020    Myocardial infarction ([address] 1999    Myocardial infarction ([address] 11/10/2015    Obesity 11/10/2015    Orthostatic hypotension 11/10/2015    Osteoarthritis 11/10/2015    Peripheral vascular disease ([address] 11/10/2015    PNA (pneumonia) 02/08/2019    PVC (premature ventricular contraction)     Rash 80/59/3861    Seroma complicating a procedure 10/02/2014    Sleep apnea     cpap    Toe laceration     Type 2 diabetes with nephropathy ([address]     Uncontrolled type 2 diabetes mellitus ([address] 11/10/2015    Vertiginous syndromes and other disorders of vestibular system 11/10/2015       Date of Evaluation: August 26, 2022    Yann Nascimento is a 65 y.o. male patient at [address] [place] [address] who was admitted to [address] [place] [address] on 8/26/2022 by Yasmin Rivers MD of Physical Medicine and Rehab service with below-mentioned medical history. HPI: This patient is a r-hand dominant previously functionally independent 66 yo male with a PMH of ICM and ICD /PPM and EF of 30-35%, afib on chronic OAC, DM2, PAD, anemia, COPD, CAD, and SHERI on CPAP who fell in the beginning of May while planting tomato plants. He sustained lumbar compression fxs and underwent L2-4 kyphoplaties by Dr Margot Lawson in June. Post op he developed worsening weakness and had several falls. CT showed post op changes wth no acute fx and multiple levels of spinal canal stenosis. He was diagnosed with a UTI at the time. MRI of the brain was normal. He then had an MRI of the lumbar spine and cervical spine .  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 CRP was elevated at 12.4. He had noted OM of the lower spine. His oac was held and he was placed on a heparin gtt. He was placed on Vanc and Ceftriaxone after IR drained the abscess. He was followed by ID and treated for 6 weeks with vanc and rocephin. He developed AMS. This was thought to be due to metabolic etiology. He has had increasing complaints of back pain and intermittent confusion. He was transferred to [facility] for ongoing therapy and abx needs. Cardiology was consulted and ICD was interrogated and found to be functioning well. A hoang was placed due to urinary retention. Neurology was consulted. He had a complete work up for  AMS. . It was deemed that he had acute encephalopathy due to meds, especially  opiates and antipsychotics. He has been weaning off zyprexa. The pt will continue on Iv abx fpr a total of 6  weeke. Eot 9/9/2022    Physical Medicine and Rehab service was consulted, and patient was initially evaluated by Dr. Aly Bowles 8/22  During reevaluation earlier today, patient shows improvement from initial consult but still shows significant functional deficits. We recommend inpatient hospital rehabilitation as reasonable and necessary due to ongoing acute medical issues which have not changed since initial pre-admission evaluation. We reviewed the preadmission screening and have approved the patient for admission to the [facility]. Attending service cleared patient for transfer to rehab today. COVID test was negative. Patient continues to have ongoing medical issues outlined above requiring physician / PA medical supervision and has functional deficits which would benefit from continued intensive therapies.        Current Level of Function: (evaluated by acute therapy staff, with bed mobility, transfers, balance personally observed post-admission in the [facility] setting minutes prior to submission of document)   PT; bed mobility sup/SBA, transfer CGA. ;limited by pelvis and low back pain, STS CGA, ambulate 12 ft x 2 with Rw and CGA    Prior Home Situation:   Lives with wife,Prior to 5/5/2022 pt. Was independent w/o A/D. However, s/p fall on 5/5/2022 pt. Has experienced a sharp decline in mobility & has not been ambulatory since kyphoplasty on 6/19/2022. Pt. Has had multiple falls over the last 3-4 months. Lives in a home with 2 steps to enter.  Current DME; RW, Rollator, wc, cane and bsc       Past Medical History:   Diagnosis Date    Acute respiratory failure with hypoxia ([address] 10/23/2014    MINI (acute kidney injury) ([address] 09/15/2014    MINI (acute kidney injury) ([address]     MINI (acute kidney injury) ([address]     Allergic rhinitis 11/10/2015    Asthma 11/10/2015    Benign essential hypertension 11/10/2015    Benign neoplasm of colon 11/10/2015    CAD (coronary artery disease) 11/10/2015    CAD (coronary artery disease) 1998, 1999    mix2, 3 stents [id number]    CAP (community acquired pneumonia) 12/31/2020    Cardiomyopathy ([address] 12/37/6265    Complicated wound infection 11/10/2015    COPD (chronic obstructive pulmonary disease) with emphysema ([address] 11/10/2015    COPD exacerbation ([address] 10/12/2011    COVID-19 12/31/2020    Diabetes mellitus type 2, controlled ([address] 11/10/2015    Diabetic neuropathy ([address] 11/10/2015    Disruption of wound, unspecified 10/09/2014    Encounter for long-term (current) use of other high-risk medications 11/10/2015    Extremity atherosclerosis with intermittent claudication ([address] 11/10/2015    H/O endarterectomy 11/10/2015    Hiatal hernia 11/10/2015    History of 2019 novel coronavirus disease (COVID-19)     12/31/2020-    Hyperglycemia due to type 1 diabetes mellitus ([address] 11/10/2015    Hyperlipidemia 11/10/2015    ICD (implantable cardioverter-defibrillator) in place 06/16/2022    Monomorphic ventricular tachycardia ([facility] 12/31/2020    Myocardial infarction [facility]) 1999    Myocardial infarction ([facility] 11/10/2015    Obesity 11/10/2015    Orthostatic hypotension 11/10/2015    Osteoarthritis 11/10/2015    Peripheral vascular disease ([facility] 11/10/2015    PNA (pneumonia) 02/08/2019    PVC (premature ventricular contraction)     Rash 03/84/9127    Seroma complicating a procedure 10/02/2014    Sleep apnea     cpap    Toe laceration     Type 2 diabetes with nephropathy ([facility]     Uncontrolled type 2 diabetes mellitus ([facility] 11/10/2015    Vertiginous syndromes and other disorders of vestibular system 11/10/2015      Past Surgical History:   Procedure Laterality Date    CARDIAC CATHETERIZATION  12/05/2018    LV EF=40%. LM:nl. LAD:30-40% mid stenosis. LCX OM1:95% stenosis. RCA:100% occlusion at RV branch. CORONARY ANGIOPLASTY WITH STENT PLACEMENT  12/05/2018    LCX OM1:2.5 x 12 [place] RAKESH    CORONARY ANGIOPLASTY WITH STENT PLACEMENT  1999    FL ABDOMEN SURGERY PROC UNLISTED  1960    abdominal cyst removed    FL CARDIAC SURG PROCEDURE UNLIST  1999    stents x3    SPINE SURGERY N/A 7/19/2022    LUMBAR 2, LUMBAR 4 KYPHOPLASTY AND ANY OTHER INDICATED LEVELS performed by Ovidio Hicks MD at [facility] MAIN OR    VASCULAR SURGERY  11/5/14    Repair of right common femoral artery     VASCULAR SURGERY  9/11/14    tiffanie femoral endarterectomy     Allergies   Allergen Reactions    Azithromycin Hives    Oxaprozin Other (See Comments)     ELEVATED BLOOD PRESSURE      Family History   Problem Relation Age of Onset    Breast Cancer Mother     Hypertension Mother     Other Father         DIVERTICULITIS    Crohn's Disease Sister     Lung Disease Brother         mesothioloma    Diabetes Sister     Heart Attack Father     Heart Disease Father     Stroke Mother       Social History     Tobacco Use    Smoking status: Former     Packs/day: 1.00     Types: Cigarettes     Quit date: 10/2/2011     Years since quitting: 10.9    Smokeless tobacco: Current   Substance Use Topics    Alcohol use:  Yes     Alcohol/week: 0.0 standard drinks      Current Facility-Administered Medications   Medication Dose Route Frequency    albuterol sulfate HFA (PROVENTIL;VENTOLIN;PROAIR) 108 (90 Base) MCG/ACT inhaler 2 puff  2 puff Inhalation Q4H PRN    amiodarone (CORDARONE) tablet 200 mg  200 mg Oral Daily    amLODIPine (NORVASC) tablet 5 mg  5 mg Oral Daily    apixaban (ELIQUIS) tablet 5 mg  5 mg Oral BID    atorvastatin (LIPITOR) tablet 20 mg  20 mg Oral Nightly    bisacodyl (DULCOLAX) suppository 10 mg  10 mg Rectal Daily PRN    calcitonin (MIACALCIN) nasal spray 1 spray  1 spray Alternating Nares Daily    carvedilol (COREG) tablet 25 mg  25 mg Oral BID WC    clopidogrel (PLAVIX) tablet 75 mg  75 mg Oral Daily    fluticasone (FLONASE) 50 MCG/ACT nasal spray 1 spray  1 spray Each Nostril Daily    furosemide (LASIX) tablet 40 mg  40 mg Oral Daily    guaiFENesin (MUCINEX) extended release tablet 1,200 mg  1,200 mg Oral BID    hydrOXYzine pamoate (VISTARIL) capsule 25 mg  25 mg Oral Nightly    insulin glargine (LANTUS) injection vial 14 Units  14 Units SubCUTAneous Nightly    insulin lispro (HUMALOG) injection vial 0-16 Units  0-16 Units SubCUTAneous TID WC    insulin lispro (HUMALOG) injection vial 0-4 Units  0-4 Units SubCUTAneous Nightly    latanoprost (XALATAN) 0.005 % ophthalmic solution 1 drop  1 drop Both Eyes Nightly    lidocaine 4 % external patch 1 patch  1 patch TransDERmal Daily    losartan (COZAAR) tablet 100 mg  100 mg Oral Daily    melatonin tablet 6 mg  6 mg Oral Nightly    montelukast (SINGULAIR) tablet 10 mg  10 mg Oral Nightly    naproxen (NAPROSYN) tablet 500 mg  500 mg Oral BID WC    OLANZapine (ZYPREXA) tablet 2.5 mg  2.5 mg Oral Nightly    pantoprazole (PROTONIX) tablet 40 mg  40 mg Oral BID AC    polyethylene glycol (GLYCOLAX) packet 17 g  17 g Oral Daily    sennosides-docusate sodium (SENOKOT-S) 8.6-50 MG tablet 2 tablet  2 tablet Oral Daily PRN    glucose chewable tablet 16 g  4 tablet Oral PRN    dextrose bolus 10% 125 mL  125 mL IntraVENous PRN    Or    dextrose bolus 10% 250 mL  250 mL IntraVENous PRN    glucagon (rDNA) injection 1 mg  1 mg SubCUTAneous PRN    dextrose 10 % infusion   IntraVENous Continuous PRN    vancomycin (VANCOCIN) 1,000 mg in sodium chloride 0.9 % 250 mL IVPB ([id number])  1,000 mg IntraVENous Q12H    mometasone-formoterol (DULERA) 100-5 MCG/ACT inhaler 2 puff  2 puff Inhalation BID    cefTRIAXone (ROCEPHIN) 2,000 mg in sodium chloride 0.9 % 50 mL IVPB mini-bag  2,000 mg IntraVENous Q24H       Review of Systems:  General: Denies: fevers, chills, sweats, fatigue, malaise, anorexia, weight loss   Eyes:  Denies: blurry vision, loss of vision, eye pain, photophobia   HENT:  Denies: hearing loss, tinnitus, earache, epistaxis, sore throat, hoarseness  Lungs: Denies: cough, wheeze, asthma, hemoptysis, dyspnea  CV:  Denies: chest pain, palpitations, syncope, orthopnea, paroxysmal nocturnal dyspnea, claudication   GI:  Denies: dysphagia, odynophagia, nausea, vomiting, diarrhea, constipation, abdominal pain, jaundice, melena   :  Denies: frequency, dysuria, nocturia, urinary incontinence, stones, hematuria   Endocrine: Denies: polydipsia/polyuria, skin changes, temperature intolerance, unexpected weight gain or loss  MSK:  Denies: back pain, joint pain, joint swelling, +muscle pain, muscle weakness   Heme:  Denies: bleeding problems, blood transfusions, bruising, pallor, swollen lymph nodes   Neuro:  Denies: headache, dysarthria, blurred vision, diplopia, seizure, memory problems, speech problems, +gait problems, coordination problems      Objective:     Visit Vitals  /74   Pulse 90   Temp 98.2 °F (36.8 °C) (Oral)   Resp 18   Ht 6' 1\" (1.854 m)   Wt 237 lb (107.5 kg)   SpO2 90%   BMI 31.27 kg/m²      Intake and Output:  No intake/output data recorded. Physical Exam:   General:  Alert, appears stated age. No acute distress. HEENT:  Normocephalic, EOM intact, facial symmetry noted. Nares patent, oral mucosa moist without lesions.    Lungs:  Clear to auscultation bilaterally. Respirations even and unlabored. Heart:  Regular rate and underlying rhythm, S1, S2. No obvious murmur, click, rub or gallop. Genitourinary: Deferred. Abdomen:  Soft, non-tender, not distended. Bowel sounds normoactive. No obvious masses or organomegaly palpated. Neuromuscular:  Generalized prox>distal weakness in the Ues  RLE hip flexion 1+ due to pain and weakness  RLeKE 4-, KF 3, DF 4, PF 5  LLE hip flexion 2, KE 4, KF 4-, DF 5, PF 5  Sensation intact     Skin:  Intact, dry, good skin turgor, age related changes present   Edema: none         Psych: Patient was oriented to person, place, and time. Without hallucinations, abnormal affect or abnormal behaviors during the examination.       Recent Results (from the past 72 hour(s))   Vancomycin Level, Trough    Collection Time: 08/24/22  5:33 AM   Result Value Ref Range    Vancomycin Tr 22.0 (HH) 5 - 20 ug/mL   Urinalysis    Collection Time: 08/24/22  5:30 PM   Result Value Ref Range    Color, UA YELLOW/STRAW      Appearance CLEAR      Specific Gravity, UA 1.010 1.001 - 1.023      pH, Urine 6.0 5.0 - 9.0      Protein, UA Negative NEG mg/dL    Glucose, UA Negative mg/dL    Ketones, Urine Negative NEG mg/dL    Bilirubin Urine Negative NEG      Blood, Urine Negative NEG      Urobilinogen, Urine 0.2 0.2 - 1.0 EU/dL    Nitrite, Urine Negative NEG      Leukocyte Esterase, Urine SMALL (A) NEG      WBC, UA 5-10 (A) NORM /hpf    RBC, UA 0-5 (A) NORM /hpf    Epithelial Cells UA 0-5 (A) NORM /hpf    BACTERIA, URINE Negative (A) NORM /hpf    Casts 0-2 (A) NORM /lpf   COVID-19, Rapid    Collection Time: 08/24/22  6:15 PM    Specimen: Nasopharyngeal   Result Value Ref Range    Source NASAL      SARS-CoV-2, Rapid Not detected NOTD     COVID-19, Rapid    Collection Time: 08/25/22  9:00 AM    Specimen: Nasopharyngeal   Result Value Ref Range    Source NASAL      SARS-CoV-2, Rapid Not detected NOTD     Basic Metabolic Panel    Collection Time: 08/25/22  7:11 PM   Result Value Ref Range    Sodium 138 136 - 145 mmol/L    Potassium 3.4 (L) 3.5 - 5.1 mmol/L    Chloride 102 98 - 107 mmol/L    CO2 30 21 - 32 mmol/L    Anion Gap 6 (L) 7 - 16 mmol/L    Glucose 205 (H) 65 - 100 mg/dL    BUN 16 8 - 23 MG/DL    Creatinine 1.00 0.8 - 1.5 MG/DL    GFR African American >60 >60 ml/min/1.73m2    GFR Non- >60 >60 ml/min/1.73m2    Calcium 8.3 8.3 - 10.4 MG/DL   Vancomycin Level, Trough    Collection Time: 08/26/22  7:30 AM   Result Value Ref Range    Vancomycin Tr 18.2 5 - 20 ug/mL         Condition on Admission: Good        Assessment:     The Post Assessment Physician Evaluation (VASILE) found the current functional status to be comparable with the pre-admission screening. The patient is a good candidate for acute inpatient rehabilitation. Nothing since the pre-admission screen has changed that determination. Rehabilitation Plan  The patient has shown the ability to tolerate and benefit from 3 hours of therapy daily and is being admitted to a comprehensive acute inpatient rehabilitation program consisting of at least 3 hours of combined physical and occupational therapies. - Begin intensive Physical Therapy for a minimum of 1.5 hours a day, at least 5 out of 7 days per week to address bed mobility, transfers, ambulation, strengthening, balance, and endurance. - Begin intensive Occupational Therapy for a minimum of 1.5 hours a day, at least 5 out of 7 days per week to address ADLs (bathing, LE dressing, toileting) and adaptive equipment as needed. - ; therapy schedule may be adjusted by MD based on patient's needs. Each of these therapies will be continued as above for the duration of the inpatient rehab stay. The patient will also require 24-hour skilled rehabilitation nursing for bowel and bladder management, skin care for decubitus ulcer prevention, pain management and ongoing medication administration.     Physical Debility s/p lumbar osteomyelitis and psoas abscess     Plan / Recommendations / Medical Decision Making:     Daily physician / PA medical management:    Physical debility [R53.81] - PT/OT daily, 3hrs/d at least 5d/weak. I have no doubt that pt can actively participate and tolerate as well as benefit from aggressive rehab    Hypertension - BP fluctuating, managed medically. Cont Norvasc, coreg and lasix as well as cozaar     ID: cont rocephin  Pain management - ongoing significant pain in back and paresthesic neuropathic pain  which is stable and controlled by PRN meds. Will require regular pain assessment and comprehensive pain management. Will add Neurontin 100mg tid    Electrolyte abnormality - hypokalemia with a K of 3.4; replace and monitor. HLD cont lipitor    CM s/p ICD; compensated    Afib; cont amiodarone 200mg daily. Cont eliquis 5mg bid    Anemia; hbg 8.8 on 8/22; f/u in am    Hypokalemia;K 3.4  on 8/25, replace and monitor    Copd; compensated; cont dulera, singulair and mucinex; well compensated    ID; cont rocephin ; detm length of treatment. Already completed 6 weeks of abx    Pneumonia prophylaxis - incentive spirometer every hour while awake. DVT risk / DVT prophylaxis - daily physician / PA exam to assess as patient is at increased risk for of thromboembolism. Mobilize as tolerated. Sequential pneumatic compression devices (SCDs) when in bed; thigh-high or knee-high thromboembolic deterrent hose when out of bed. Eliquis     Afib; currently Nsr. Cont Eliquis    DM; cont  SS, lantus 24u daily at bedtime. Adjust as needed    GI prophylaxis - resume PPI. At times may need additional antacids, Maalox prn. History of tobacco use - stress abstinence, education. Reportedly quit 15 yrs ago but continues with tobacco chew    General skin care / wound prevention - monitor  general skin wound status daily per staff and physician / PA. At risk for failure due to impaired mobility.      Bladder program / urinary retention / neurogenic bladder - schedule voids q6-8h. Check post-void residual as needed; in-and-out catheter if post-void residual is more than 400ml. Bowel program - at risk for constipation as a side effect of opioids, other medications, impaired mobility, etc. MiraLAX daily for regularity, Joanna-Colace for stool softener. PRN MOM, bisacodyl suppository or tablets for constipation. Disposition: The patient's prognosis for significant practical improvement within a reasonable period of time appears good and the estimated length of stay is 20 days; patient is expected to return home with spouse / family supervision and continued rehabilitation with home health therapy. Given the patient's complex neurologic / medical condition, risks of further medical complications include but are not limited to: thromboembolism / pulmonary embolism, skin breakdown, pneumonia due to decreased mobility, CVA, MI, cardiac arrhythmias due to HTN, postural hypotension. For these ongoing medical issues, rehabilitation services could not be safely provided at a lower level of care such as a skilled nursing facility or nursing home.         Signed By: Susannah Castellano MD, MD     August 26, 2022

## 2022-08-26 NOTE — PROGRESS NOTES
and ceftriaxone antibiotics. Tomas was placed as he had urinary retention of 500 mL. Neurology was consulted. Pain management with Percocet and morphine. Cardiology was consulted and ICD device was interrogated and functioning well. .        Past Medical History:   Past Medical History:   Diagnosis Date    Acute respiratory failure with hypoxia ([address] 10/23/2014    MINI (acute kidney injury) ([address] 09/15/2014    MINI (acute kidney injury) ([address]     MINI (acute kidney injury) ([address]     Allergic rhinitis 11/10/2015    Asthma 11/10/2015    Benign essential hypertension 11/10/2015    Benign neoplasm of colon 11/10/2015    CAD (coronary artery disease) 11/10/2015    CAD (coronary artery disease) 1998, 1999    mix2, 3 stents [id number]    CAP (community acquired pneumonia) 12/31/2020    Cardiomyopathy ([address] 34/07/3881    Complicated wound infection 11/10/2015    COPD (chronic obstructive pulmonary disease) with emphysema ([address] 11/10/2015    COPD exacerbation ([address] 10/12/2011    COVID-19 12/31/2020    Diabetes mellitus type 2, controlled ([address] 11/10/2015    Diabetic neuropathy ([address] 11/10/2015    Disruption of wound, unspecified 10/09/2014    Encounter for long-term (current) use of other high-risk medications 11/10/2015    Extremity atherosclerosis with intermittent claudication ([address] 11/10/2015    H/O endarterectomy 11/10/2015    Hiatal hernia 11/10/2015    History of 2019 novel coronavirus disease (COVID-19)     12/31/2020-    Hyperglycemia due to type 1 diabetes mellitus ([address] 11/10/2015    Hyperlipidemia 11/10/2015    ICD (implantable cardioverter-defibrillator) in place 06/16/2022    Monomorphic ventricular tachycardia ([address] 12/31/2020    Myocardial infarction ([address] 1999    Myocardial infarction ([address] 11/10/2015    Obesity 11/10/2015    Orthostatic hypotension 11/10/2015    Osteoarthritis 11/10/2015    Peripheral vascular disease ([address] 11/10/2015    PNA (pneumonia) 02/08/2019    PVC (premature ventricular contraction) Rash 85/72/4022    Seroma complicating a procedure 10/02/2014    Sleep apnea     cpap    Toe laceration     Type 2 diabetes with nephropathy ([facility]     Uncontrolled type 2 diabetes mellitus ([facility] 11/10/2015    Vertiginous syndromes and other disorders of vestibular system 11/10/2015       Pt's Goal:  Pt. 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      Precautions:  Falls and Poor Safety Awareness    Impairments:    Decreased strength B LE  [x]     Decreased strength trunk/core  [x]     Decreased AROM   [x]     Decreased PROM  []    Decreased endurance  [x]     Decreased balance sitting  [x]     Decreased balance standing  [x]     Pain   [x]     Slow ambulation velocity  [x]    Decreased coordination  [x]    Decreased safety awareness  [x]      Functional Limitations:   Decreased independence with bed mobility  [x]     Decreased independence with functional transfers  [x]     Decreased independence with ambulation  [x]     Decreased independence with stair negotiation  [x]       Previous Functional Level: Prior to 5/5/2022 pt. Was independent w/o A/D. However, s/p fall on 5/5/2022 pt. Has experienced a sharp decline in mobility & has not been ambulatory since kyphoplasty on 6/19/2022. Pt. Has had multiple falls over the last 3-4 months.     Home Situation:      Environment   Living Situation Private Residence   Home Entrance 1 4\" step & 1 6\" step Stairs and no Rail(s)   Lives Alone No   Support Systems Spouse/Significant Other and Child(ilsa)   DME Used Rolling Walker, Wheelchair , and Bedside Commode   DME Available Rolling Walker, Rollator, Wheelchair , Katharina Heap, and Bedside Commode            Outcome Measures:     Pain level: no c/o pain @ rest    Vital Signs:  /74   Pulse 90   Temp 98.2 °F (36.8 °C) (Oral)   Resp 18   Ht 6' 1\" (1.854 m)   Wt 237 lb (107.5 kg)   SpO2 90%   BMI 31.27 kg/m²      Education:  role of PT, orientation to [facility], gait w/ RW    Interdisciplinary maintain balance while turning head/trunk    Static Standing Fair:  Pt. requires UE support;  may need occasional min A    Dynamic Standing Fair - accepts minimal challenge;  can maintain balance while turning head/trunk    Picking Up Object From Floor 88     Not attempted due to medical condition or safety concerns         BED MOBILITY & TRANSFERS Quality Indicator Score Comments   Rolling 10     Not attempted due to environmental limitations NT as pt. preferred to stay up in chair during session   Supine to Sit 10     Not attempted due to environmental limitations NT as pt. preferred to stay up in chair during session   Sit to Supine 10     Not attempted due to environmental limitations NT as pt. preferred to stay up in chair during session   Sit to Stand 3  Partial/moderate assistance    mod A   Bed to/from Chair 3  Partial/moderate assistance    min A   Car Transfer 88     Not attempted due to medical condition or safety concerns         WHEELCHAIR MOBILITY/MANAGEMENT Initial Assessment/Quality Indicator Score Comments   Able to Propel 50' w/ 2 Turns 1  Dependent    Pt. unable to self-propel secondary to weakness B UEs & LEs   Able to Propel 150' 1  Dependent        Wheelchair set up assistance required Total A    Wheelchair management Dependent        AMBULATION Initial Assessment/Quality Indicator Score Comments   Assistive device RW and Gait Belt    Walk 10' 1       min A x1 & SBA x2 to follow closely w/ w/c  Pt. Amb.  X15' w/ flexed posture, decreased paul, & heavy reliance on RW   Walk 50' with 2 Turns 88     Not attempted due to medical condition or safety concerns     Walk 150' 88     Not attempted due to medical condition or safety concerns     Walking 10' on Unlevel Surfaces 88     Not attempted due to medical condition or safety concerns         STEPS/STAIRS Initial Assessment/Quality Indicator Sore Comments   1 Curb Step 88     Not attempted due to medical condition or safety concerns     4 Steps 88     Not attempted due to medical condition or safety concerns     12 Steps 88     Not attempted due to medical condition or safety concerns     Curbs/Ramps NT        Pt. Was left up in recliner in NAD, call bell in reach. PHYSICAL THERAPY PLAN OF CARE    Therapy Diagnosis:   Please see table above    Order received from MD for physical therapy services and chart reviewed. Pt to be seen at least 5 times per week for at least 1.5 hours of physical therapy per day for 2 weeks. Thank you for the referral.    Goals:    Short Term Goals:  Pt will demonstrate roll left & right & transition supine<>sit with Min A in 1 week   2. Pt. will transfer from bed to/from chair with CGA using the least restrictive device in 1 week    3. Pt. will ambulate with 50 feet with RW or LRAD and Min A in 1  week  4. Pt. Will ambulate up/down 1 steps in parallel bars and Mod A in 1 week  5. Pt. Will propel w/c 100 feet with Supervision/Standby Assist in 1 week    Long Term Goals:  Pt will demonstrate roll left & right & transition supine<>sit with Independent in 2 weeks  2. Pt. will transfer from bed to/from chair with Independent using the least restrictive device in 2 weeks   3. Pt. will ambulate with 150 feet with RW or LRAD and Supervision/Standby Assist in 2  weeks  4. Pt. Will ambulate up/down 2 single curb steps with RW and Min A in 2 weeks  5. Pt. Will propel w/c 150 feet with Supervision/Standby Assist in 2 weeks      Pt would benefit from skilled physical therapy in order to improve independent functional mobility within the home. Interventions may include range of motion (AROM, PROM B LE/trunk), motor function (B LE/trunk strengthening/coordination), activity tolerance (vitals, oxygen saturation levels), bed mobility training, balance activities, gait training (progressive ambulation program), and functional transfer training. Please see IRC;  Interdisciplinary Eval, Care Plan, and Patient Education for further information regarding physical therapy examination and plan of care.        Jaycee Meyer, PT  8/26/2022

## 2022-08-27 LAB
GLUCOSE BLD STRIP.AUTO-MCNC: 127 MG/DL (ref 65–100)
GLUCOSE BLD STRIP.AUTO-MCNC: 151 MG/DL (ref 65–100)
GLUCOSE BLD STRIP.AUTO-MCNC: 158 MG/DL (ref 65–100)
GLUCOSE BLD STRIP.AUTO-MCNC: 209 MG/DL (ref 65–100)
SERVICE CMNT-IMP: ABNORMAL

## 2022-08-27 PROCEDURE — 1180000000 HC REHAB R&B

## 2022-08-27 PROCEDURE — 99232 SBSQ HOSP IP/OBS MODERATE 35: CPT | Performed by: PHYSICAL MEDICINE & REHABILITATION

## 2022-08-27 PROCEDURE — 2500000003 HC RX 250 WO HCPCS: Performed by: PHYSICAL MEDICINE & REHABILITATION

## 2022-08-27 PROCEDURE — 2580000003 HC RX 258: Performed by: PHYSICAL MEDICINE & REHABILITATION

## 2022-08-27 PROCEDURE — 6370000000 HC RX 637 (ALT 250 FOR IP): Performed by: PHYSICAL MEDICINE & REHABILITATION

## 2022-08-27 PROCEDURE — 94760 N-INVAS EAR/PLS OXIMETRY 1: CPT

## 2022-08-27 PROCEDURE — 6360000002 HC RX W HCPCS: Performed by: PHYSICAL MEDICINE & REHABILITATION

## 2022-08-27 PROCEDURE — 2700000000 HC OXYGEN THERAPY PER DAY

## 2022-08-27 PROCEDURE — 97530 THERAPEUTIC ACTIVITIES: CPT

## 2022-08-27 PROCEDURE — 97110 THERAPEUTIC EXERCISES: CPT

## 2022-08-27 PROCEDURE — 97116 GAIT TRAINING THERAPY: CPT

## 2022-08-27 PROCEDURE — 94640 AIRWAY INHALATION TREATMENT: CPT

## 2022-08-27 PROCEDURE — 82962 GLUCOSE BLOOD TEST: CPT

## 2022-08-27 PROCEDURE — 97535 SELF CARE MNGMENT TRAINING: CPT

## 2022-08-27 PROCEDURE — 94645 CONT INHLJ TX EACH ADDL HOUR: CPT

## 2022-08-27 RX ORDER — ACETAMINOPHEN 325 MG/1
650 TABLET ORAL 3 TIMES DAILY
Status: DISCONTINUED | OUTPATIENT
Start: 2022-08-27 | End: 2022-09-07 | Stop reason: HOSPADM

## 2022-08-27 RX ADMIN — MOMETASONE FUROATE AND FORMOTEROL FUMARATE DIHYDRATE 2 PUFF: 100; 5 AEROSOL RESPIRATORY (INHALATION) at 05:55

## 2022-08-27 RX ADMIN — ALBUTEROL SULFATE 2 PUFF: 90 AEROSOL, METERED RESPIRATORY (INHALATION) at 18:21

## 2022-08-27 RX ADMIN — MONTELUKAST 10 MG: 10 TABLET, FILM COATED ORAL at 22:00

## 2022-08-27 RX ADMIN — ANTI-FUNGAL POWDER MICONAZOLE NITRATE TALC FREE: 1.42 POWDER TOPICAL at 22:11

## 2022-08-27 RX ADMIN — GUAIFENESIN 1200 MG: 600 TABLET ORAL at 22:05

## 2022-08-27 RX ADMIN — ANTI-FUNGAL POWDER MICONAZOLE NITRATE TALC FREE: 1.42 POWDER TOPICAL at 09:37

## 2022-08-27 RX ADMIN — APIXABAN 5 MG: 5 TABLET, FILM COATED ORAL at 09:00

## 2022-08-27 RX ADMIN — CARVEDILOL 25 MG: 25 TABLET, FILM COATED ORAL at 09:00

## 2022-08-27 RX ADMIN — Medication 6 MG: at 22:05

## 2022-08-27 RX ADMIN — NAPROXEN 500 MG: 250 TABLET ORAL at 07:52

## 2022-08-27 RX ADMIN — APIXABAN 5 MG: 5 TABLET, FILM COATED ORAL at 22:05

## 2022-08-27 RX ADMIN — HYDROXYZINE PAMOATE 25 MG: 25 CAPSULE ORAL at 22:05

## 2022-08-27 RX ADMIN — NAPROXEN 500 MG: 250 TABLET ORAL at 16:54

## 2022-08-27 RX ADMIN — ATORVASTATIN CALCIUM 20 MG: 20 TABLET, FILM COATED ORAL at 22:05

## 2022-08-27 RX ADMIN — LOSARTAN POTASSIUM 100 MG: 50 TABLET, FILM COATED ORAL at 09:00

## 2022-08-27 RX ADMIN — INSULIN LISPRO 4 UNITS: 100 INJECTION, SOLUTION INTRAVENOUS; SUBCUTANEOUS at 12:47

## 2022-08-27 RX ADMIN — OLANZAPINE 2.5 MG: 5 TABLET, FILM COATED ORAL at 22:05

## 2022-08-27 RX ADMIN — GUAIFENESIN 1200 MG: 600 TABLET ORAL at 09:00

## 2022-08-27 RX ADMIN — VANCOMYCIN HYDROCHLORIDE 1000 MG: 1 INJECTION, POWDER, LYOPHILIZED, FOR SOLUTION INTRAVENOUS at 18:44

## 2022-08-27 RX ADMIN — MOMETASONE FUROATE AND FORMOTEROL FUMARATE DIHYDRATE 2 PUFF: 100; 5 AEROSOL RESPIRATORY (INHALATION) at 18:21

## 2022-08-27 RX ADMIN — INSULIN GLARGINE 14 UNITS: 100 INJECTION, SOLUTION SUBCUTANEOUS at 22:16

## 2022-08-27 RX ADMIN — CLOPIDOGREL BISULFATE 75 MG: 75 TABLET ORAL at 09:00

## 2022-08-27 RX ADMIN — ACETAMINOPHEN 650 MG: 325 TABLET ORAL at 12:46

## 2022-08-27 RX ADMIN — VANCOMYCIN HYDROCHLORIDE 1000 MG: 1 INJECTION, POWDER, LYOPHILIZED, FOR SOLUTION INTRAVENOUS at 07:27

## 2022-08-27 RX ADMIN — CALCITONIN SALMON 1 SPRAY: 200 SPRAY, METERED NASAL at 09:09

## 2022-08-27 RX ADMIN — ACETAMINOPHEN 650 MG: 325 TABLET ORAL at 22:06

## 2022-08-27 RX ADMIN — AMLODIPINE BESYLATE 5 MG: 5 TABLET ORAL at 09:00

## 2022-08-27 RX ADMIN — AMIODARONE HYDROCHLORIDE 200 MG: 200 TABLET ORAL at 09:00

## 2022-08-27 RX ADMIN — CEFTRIAXONE 2000 MG: 2 INJECTION, POWDER, FOR SOLUTION INTRAMUSCULAR; INTRAVENOUS at 16:54

## 2022-08-27 RX ADMIN — POLYETHYLENE GLYCOL 3350 17 G: 17 POWDER, FOR SOLUTION ORAL at 09:00

## 2022-08-27 RX ADMIN — PANTOPRAZOLE SODIUM 40 MG: 40 TABLET, DELAYED RELEASE ORAL at 06:44

## 2022-08-27 RX ADMIN — FUROSEMIDE 40 MG: 40 TABLET ORAL at 09:00

## 2022-08-27 RX ADMIN — LATANOPROST 1 DROP: 50 SOLUTION OPHTHALMIC at 22:11

## 2022-08-27 RX ADMIN — PANTOPRAZOLE SODIUM 40 MG: 40 TABLET, DELAYED RELEASE ORAL at 16:54

## 2022-08-27 RX ADMIN — FLUTICASONE PROPIONATE 1 SPRAY: 50 SPRAY, METERED NASAL at 09:10

## 2022-08-27 ASSESSMENT — PAIN DESCRIPTION - ORIENTATION: ORIENTATION: LOWER

## 2022-08-27 ASSESSMENT — PAIN DESCRIPTION - DESCRIPTORS: DESCRIPTORS: ACHING

## 2022-08-27 ASSESSMENT — PAIN SCALES - GENERAL
PAINLEVEL_OUTOF10: 4
PAINLEVEL_OUTOF10: 7

## 2022-08-27 ASSESSMENT — PAIN DESCRIPTION - LOCATION: LOCATION: BACK

## 2022-08-27 NOTE — PROGRESS NOTES
Buttocks red with three sheared areas that are open and bleeding. Groin with yeasty rash on scrotum and inner leg thigh area. Nystatin powder ordered and protective barrier cream applied to sacrum.

## 2022-08-27 NOTE — PROGRESS NOTES
Physical Therapy  Facility/Department: [facility] 9 INPATIENT REHAB UNIT  Daily Treatment Note  NAME: Paula Leblanc  : 1945  MRN: [id number]    Date of Service: 2022    Discharge Recommendations:           Patient Diagnosis(es): There were no encounter diagnoses. Assessment         Plan          Restrictions        Subjective          Objective   Vitals                         Goals       Education       Therapy Time   Individual Concurrent Group Co-treatment   Time In 5893         Time Out 1111         Minutes 39         Timed Code Treatment Minutes: 44 Minutes       Veronika Ends, PTA         PHYSICAL THERAPY DAILY NOTE  Time In:  9933  Time Out:  1111  Total Treatment Time:  44 Minutes  Pt. Seen for: AM, Gait Training, Patient Education, Therapeutic Exercise, and Transfer Training     Subjective: \"Oh my back been hurting, I'm so weak\"         Objective:   Precautions: Falls    GROSS ASSESSMENT Daily Assessment           COGNITION Daily Assessment           BED/MAT MOBILITY Daily Assessment    Rolling Right: NT  Rolling Left: NT  Supine to Sit: NT  Sit to Supine: NT       TRANSFERS Daily Assessment    Sit to Stand: Mod A  Stand to Sit: Mod A  Transfer Type: Stand Pivot  Transfer Assistance: Mod A  Car Transfers: NT         GAIT Daily Assessment   Additional 15ft and 25ft using FWW for support, w/c follow for safety Amount of Assistance:  Mod A  Distance (ft): 20    Assistive Device: RW  Surface: Level Surface       STEPS/STAIRS Daily Assessment    Steps Ambulated:  0  Level of Assistance:  NT  Railing: NT  Assistive Device: NT       BALANCE Daily Assessment    Static Sitting: Fair:  Pt. requires UE support;  may need occasional min A  Dynamic Sitting: air  Static Standing: Fair:  Pt. requires UE support;  may need occasional min A  Dynamic Standing: Poor - unable to accept challenge or move without LOB        WHEELCHAIR MOBILITY Daily Assessment    Able to Propel (ft): 0    Assistance: NT  Surface: NT  Wheelchair Set-up: NT       LOWER EXTREMITY EXERCISES Daily Assessment   NU step 10 min level 1, BUE and BLE continuous, rest breaks every 2 min      Pain level: 4/10  Pain Location:  lumbar region  Pain Interventions: improved body mechanics and positioning    Vital Signs:  WNL    Education:  instructed pt on proper safety awareness with transfer    Pt.  Left sitting in recliner chair in room, call light and all needs in reach         Assessment: patient displays improved carryover with sit to stands when educated to push up from w/c instead of pulling on walker, requires seated rest breaks 2-4 min between gait due to low endurance         Plan of Care: Continue with current [facility] 91, PTA  8/27/2022

## 2022-08-27 NOTE — PROGRESS NOTES
Occupational Therapy  OT DAILY NOTE    Time In: 4023  Time Out: 1030     Score Comments   Rolling Setup or clean-up assistance     Supine to Sit Partial/moderate assistance minimal assistance   Sit to Stand Partial/moderate assistance moderate assistance with UE support   Transfer Assist Moderate assistance         Eating Current Functional Level   Score Independent   Comments       Oral Hygiene  Current Functional Level   Score Setup or clean-up assistance   Comments set up, seated in w/c at sink     Bathing Current Functional Level   Score Partial/moderate assistance   Comments minimal assistance to bathe back and buttocks     Upper Body   Dressing Current Functional Level   Score Setup or clean-up assistance   Comments seated in w/c     Lower Body   Dressing Current Functional Level     Functional Level Substantial/maximal assistance   Comments assistance lacing through legs and pulling up over waist, decreased endurance noted after bathing     Donning/Alder  Footwear Current Functional Level     Functional Level Substantial/maximal assistance   Comments therapist donned socks and set up for shoes     Toilet transfer Current Functional Level   Functional Level Partial/moderate assistance   Comments moderate A for lifting to stand, use of FWW and GB     Toilet Hygiene   Current Functional Level   Score Substantial/maximal assistance   Comments assistance for hygiene, use of FWW for UE support     Summary of Session: Patient demonstrated great tolerance towards today's session. He benefited from frequent breaks and verbal prompts for sequencing and task completion with ADLS. Increased assistance for transfers needed as session progressed due to poor activity tolerance and endurance.      Kai Hernandez, OT  8/27/2022

## 2022-08-27 NOTE — PROGRESS NOTES
Dual skin assessment with Yesica Sumner RN. Bilateral ankles with small abrasion. Patients buttocks red, with three open areas of bleeding, scrotum with yeast like rash continuing into inner thigh. Scattered ecchymosis on BLE and BUE from IV sticks. Patient with no complaints of pain. All needs assessed at this time.

## 2022-08-27 NOTE — PROGRESS NOTES
VANCO DAILY FOLLOW UP NOTE  [address] [facility] Pharmacokinetic Monitoring Service - Vancomycin    Consulting Provider: Lexie Arreaga MD   Indication: L2-L4 osteodiscitis and psoas abscess  Target Concentration: Goal AUC/GIOVANNY 400-600 mg*hr/L  Day of Therapy: EOT 9/9/22  Additional Antimicrobials: ceftriaxone    Pertinent Laboratory Values: Wt Readings from Last 1 Encounters:   08/26/22 237 lb (107.5 kg)     Temp Readings from Last 1 Encounters:   08/27/22 98.1 °F (36.7 °C)     Recent Labs     08/25/22  1911   BUN 16   CREATININE 1.00     Estimated Creatinine Clearance: 78 mL/min (based on SCr of 1 mg/dL). Lab Results   Component Value Date/Time    VANCOTROUGH 18.2 08/26/2022 07:30 AM    VANCORANDOM 14.0 08/20/2022 06:48 AM       MRSA Nasal Swab: N/A. Non-respiratory infection. Assessment:  Date/Time Dose Concentration AUC   8/26 0730 1g q12h Tr = 18.2 [facility]   Note: Serum concentrations collected for AUC dosing may appear elevated if collected in close proximity to the dose administered, this is not necessarily an indication of toxicity    Plan:  Continuation of dosing regimen from [facility].  1g q12h.     Repeat vancomycin concentrations will be ordered as clinically appropriate   Pharmacy will continue to monitor patient and adjust therapy as indicated    Thank you for the consult,  Simran Villanueva, PharmD, BCOP  Clinical Pharmacist  Contact [employer]

## 2022-08-27 NOTE — PROGRESS NOTES
Physical Medicine and Rehabilitation Progress Note    Kd Parrish  Admit Date: 8/26/2022  Admit Diagnosis: Physical debility [R53.81]    Subjective:Patient seen face-to-face. Slept ok overnight. Denies pain at rest, lightheadedness, SOB or nausea. Participating in therapy.     Objective:     Current Facility-Administered Medications   Medication Dose Route Frequency    miconazole (MICOTIN) 2 % powder   Topical BID    albuterol sulfate HFA (PROVENTIL;VENTOLIN;PROAIR) 108 (90 Base) MCG/ACT inhaler 2 puff  2 puff Inhalation Q4H PRN    amiodarone (CORDARONE) tablet 200 mg  200 mg Oral Daily    amLODIPine (NORVASC) tablet 5 mg  5 mg Oral Daily    apixaban (ELIQUIS) tablet 5 mg  5 mg Oral BID    atorvastatin (LIPITOR) tablet 20 mg  20 mg Oral Nightly    bisacodyl (DULCOLAX) suppository 10 mg  10 mg Rectal Daily PRN    calcitonin (MIACALCIN) nasal spray 1 spray  1 spray Alternating Nares Daily    carvedilol (COREG) tablet 25 mg  25 mg Oral BID     clopidogrel (PLAVIX) tablet 75 mg  75 mg Oral Daily    fluticasone (FLONASE) 50 MCG/ACT nasal spray 1 spray  1 spray Each Nostril Daily    furosemide (LASIX) tablet 40 mg  40 mg Oral Daily    guaiFENesin (MUCINEX) extended release tablet 1,200 mg  1,200 mg Oral BID    hydrOXYzine pamoate (VISTARIL) capsule 25 mg  25 mg Oral Nightly    insulin glargine (LANTUS) injection vial 14 Units  14 Units SubCUTAneous Nightly    insulin lispro (HUMALOG) injection vial 0-16 Units  0-16 Units SubCUTAneous TID     insulin lispro (HUMALOG) injection vial 0-4 Units  0-4 Units SubCUTAneous Nightly    latanoprost (XALATAN) 0.005 % ophthalmic solution 1 drop  1 drop Both Eyes Nightly    lidocaine 4 % external patch 1 patch  1 patch TransDERmal Daily    losartan (COZAAR) tablet 100 mg  100 mg Oral Daily    melatonin tablet 6 mg  6 mg Oral Nightly    montelukast (SINGULAIR) tablet 10 mg  10 mg Oral Nightly    naproxen (NAPROSYN) tablet 500 mg  500 mg Oral BID     OLANZapine (ZYPREXA) @BSHSIFLOW(2865)@   @BSHSIFLOW(2381)@   @BSHSIFLOW(2584)@   @BSHSIFLOW(4998)@   @BSHSIFLOW(8021)@   @BSHSIFLOW(5454)@     [place] Jb Fall Risk Assessment:  @BSHSIFLOW(7295)@     Speech Assessment:  @BSHSIFLOW(0700)@      Ambulation:  @BSHSIFLOW(7601)@     Labs/Studies:  Recent Results (from the past 72 hour(s))   Urinalysis    Collection Time: 08/24/22  5:30 PM   Result Value Ref Range    Color, UA YELLOW/STRAW      Appearance CLEAR      Specific Gravity, UA 1.010 1.001 - 1.023      pH, Urine 6.0 5.0 - 9.0      Protein, UA Negative NEG mg/dL    Glucose, UA Negative mg/dL    Ketones, Urine Negative NEG mg/dL    Bilirubin Urine Negative NEG      Blood, Urine Negative NEG      Urobilinogen, Urine 0.2 0.2 - 1.0 EU/dL    Nitrite, Urine Negative NEG      Leukocyte Esterase, Urine SMALL (A) NEG      WBC, UA 5-10 (A) NORM /hpf    RBC, UA 0-5 (A) NORM /hpf    Epithelial Cells UA 0-5 (A) NORM /hpf    BACTERIA, URINE Negative (A) NORM /hpf    Casts 0-2 (A) NORM /lpf   COVID-19, Rapid    Collection Time: 08/24/22  6:15 PM    Specimen: Nasopharyngeal   Result Value Ref Range    Source NASAL      SARS-CoV-2, Rapid Not detected NOTD     COVID-19, Rapid    Collection Time: 08/25/22  9:00 AM    Specimen: Nasopharyngeal   Result Value Ref Range    Source NASAL      SARS-CoV-2, Rapid Not detected NOTD     Basic Metabolic Panel    Collection Time: 08/25/22  7:11 PM   Result Value Ref Range    Sodium 138 136 - 145 mmol/L    Potassium 3.4 (L) 3.5 - 5.1 mmol/L    Chloride 102 98 - 107 mmol/L    CO2 30 21 - 32 mmol/L    Anion Gap 6 (L) 7 - 16 mmol/L    Glucose 205 (H) 65 - 100 mg/dL    BUN 16 8 - 23 MG/DL    Creatinine 1.00 0.8 - 1.5 MG/DL    GFR African American >60 >60 ml/min/1.73m2    GFR Non- >60 >60 ml/min/1.73m2    Calcium 8.3 8.3 - 10.4 MG/DL   Vancomycin Level, Trough    Collection Time: 08/26/22  7:30 AM   Result Value Ref Range    Vancomycin Tr 18.2 5 - 20 ug/mL   POCT Glucose    Collection Time: 08/26/22  5:12 PM Result Value Ref Range    POC Glucose 181 (H) 65 - 100 mg/dL    Performed by: Sean    POCT Glucose    Collection Time: 08/26/22  8:45 PM   Result Value Ref Range    POC Glucose 202 (H) 65 - 100 mg/dL    Performed by: Rosamaria    POCT Glucose    Collection Time: 08/27/22  6:46 AM   Result Value Ref Range    POC Glucose 127 (H) 65 - 100 mg/dL    Performed by: Rosamaria      Assessment:  Physical Debility s/p lumbar osteomyelitis and psoas abscess      Plan / Recommendations / Medical Decision Making:      Daily physician / PA medical management:     Physical debility [R53.81] - PT/OT daily, 3hrs/d at least 5d/weak. I have no doubt that pt can actively participate and tolerate as well as benefit from aggressive rehab     Hypertension - BP fluctuating, managed medically. Cont Norvasc, coreg and lasix as well as cozaar   - 8/27 /68     ID; cont rocephin through 9/9, already completed 6 weeks of abx    Pain management - ongoing significant pain in back and paresthesic neuropathic pain  which is stable and controlled by PRN meds. Will require regular pain assessment and comprehensive pain management. Will add Neurontin 100mg tid  - 8/27 begin scheduled tylenol tid, on naproxen bid, lidoderm patch     Electrolyte abnormality - hypokalemia with a K of 3.4 on 8/25; replace and monitor. Bmp ordered for angel. am     HLD cont lipitor     CM s/p ICD; compensated     Afib; cont amiodarone 200mg daily. Cont eliquis 5mg bid     Anemia; hbg 8.8 on 8/22; cbc ordered for angel. am     COPD; compensated; cont dulera, singulair and mucinex; well compensated     Pneumonia prophylaxis - incentive spirometer every hour while awake. DVT risk / DVT prophylaxis - daily physician / PA exam to assess as patient is at increased risk for of thromboembolism. Mobilize as tolerated. Sequential pneumatic compression devices (SCDs) when in bed; thigh-high or knee-high thromboembolic deterrent hose when out of bed. Eliquis       DM; cont  SS, lantus 24u daily at bedtime. Adjust as     needed  - 8/27 BG - 127 this am     GI prophylaxis - resume PPI. At times may need additional antacids, Maalox prn. History of tobacco use - stress abstinence, education. Reportedly quit 15 yrs ago but continues with tobacco chew     General skin care / wound prevention - monitor  general skin wound status daily per staff and physician / PA. At risk for failure due to impaired mobility. Bladder program / urinary retention / neurogenic bladder - schedule voids q6-8h. Check post-void residual as needed; in-and-out catheter if post-void residual is more than 400ml. Bowel program - at risk for constipation as a side effect of opioids, other medications, impaired mobility, etc. MiraLAX daily for regularity, Joanna-Colace for stool softener. PRN MOM, bisacodyl suppository or tablets for constipation. Time spent was 25 minutes with over 1/2 in direct patient care/examination, consultation and coordination of care.     Signed By: Sherron Lopez MD     August 27, 2022

## 2022-08-27 NOTE — PROGRESS NOTES
Patient placed on home cpap with 2L bleed in.        08/26/22 1457   NIV Type   Skin Assessment Clean, dry, & intact   Skin Protection for O2 Device Yes   NIV Started/Stopped On   Equipment Type CPAP   Mode Auto-PAP   Mask Type Nasal mask   Mask Size Large   Settings/Measurements   CPAP/EPAP   (AUTOSET)   Resp 19   O2 Flow Rate (L/min) 2 L/min  (BLEED IN CPAP)   FiO2  28 %   Mask Leak (lpm)   (mask fits well)   Comfort Level Good   Using Accessory Muscles No   SpO2 94   Patient's Home Machine Yes (type/vendor)   Electrical Safety Check Performed Yes

## 2022-08-28 LAB
ALBUMIN SERPL-MCNC: 2.3 G/DL (ref 3.2–4.6)
ALBUMIN/GLOB SERPL: 0.7 {RATIO} (ref 1.2–3.5)
ALP SERPL-CCNC: 112 U/L (ref 50–136)
ALT SERPL-CCNC: 54 U/L (ref 12–65)
ANION GAP SERPL CALC-SCNC: 4 MMOL/L (ref 7–16)
AST SERPL-CCNC: 23 U/L (ref 15–37)
BILIRUB SERPL-MCNC: 0.5 MG/DL (ref 0.2–1.1)
BUN SERPL-MCNC: 19 MG/DL (ref 8–23)
CALCIUM SERPL-MCNC: 8.5 MG/DL (ref 8.3–10.4)
CHLORIDE SERPL-SCNC: 107 MMOL/L (ref 98–107)
CO2 SERPL-SCNC: 28 MMOL/L (ref 21–32)
CREAT SERPL-MCNC: 1 MG/DL (ref 0.8–1.5)
ERYTHROCYTE [DISTWIDTH] IN BLOOD BY AUTOMATED COUNT: 16.7 % (ref 11.9–14.6)
GLOBULIN SER CALC-MCNC: 3.5 G/DL (ref 2.3–3.5)
GLUCOSE BLD STRIP.AUTO-MCNC: 125 MG/DL (ref 65–100)
GLUCOSE BLD STRIP.AUTO-MCNC: 164 MG/DL (ref 65–100)
GLUCOSE BLD STRIP.AUTO-MCNC: 188 MG/DL (ref 65–100)
GLUCOSE BLD STRIP.AUTO-MCNC: 195 MG/DL (ref 65–100)
GLUCOSE SERPL-MCNC: 125 MG/DL (ref 65–100)
HCT VFR BLD AUTO: 24.7 % (ref 41.1–50.3)
HGB BLD-MCNC: 7.9 G/DL (ref 13.6–17.2)
MCH RBC QN AUTO: 30.9 PG (ref 26.1–32.9)
MCHC RBC AUTO-ENTMCNC: 32 G/DL (ref 31.4–35)
MCV RBC AUTO: 96.5 FL (ref 79.6–97.8)
NRBC # BLD: 0 K/UL (ref 0–0.2)
PLATELET # BLD AUTO: 244 K/UL (ref 150–450)
PMV BLD AUTO: 10.5 FL (ref 9.4–12.3)
POTASSIUM SERPL-SCNC: 3.7 MMOL/L (ref 3.5–5.1)
PROT SERPL-MCNC: 5.8 G/DL (ref 6.3–8.2)
RBC # BLD AUTO: 2.56 M/UL (ref 4.23–5.6)
SERVICE CMNT-IMP: ABNORMAL
SODIUM SERPL-SCNC: 139 MMOL/L (ref 136–145)
VANCOMYCIN SERPL-MCNC: 20.2 UG/ML
WBC # BLD AUTO: 6.5 K/UL (ref 4.3–11.1)

## 2022-08-28 PROCEDURE — 1180000000 HC REHAB R&B

## 2022-08-28 PROCEDURE — 82962 GLUCOSE BLOOD TEST: CPT

## 2022-08-28 PROCEDURE — 85027 COMPLETE CBC AUTOMATED: CPT

## 2022-08-28 PROCEDURE — 94760 N-INVAS EAR/PLS OXIMETRY 1: CPT

## 2022-08-28 PROCEDURE — 99232 SBSQ HOSP IP/OBS MODERATE 35: CPT | Performed by: PHYSICAL MEDICINE & REHABILITATION

## 2022-08-28 PROCEDURE — 2580000003 HC RX 258: Performed by: PHYSICAL MEDICINE & REHABILITATION

## 2022-08-28 PROCEDURE — 2700000000 HC OXYGEN THERAPY PER DAY

## 2022-08-28 PROCEDURE — 6360000002 HC RX W HCPCS: Performed by: PHYSICAL MEDICINE & REHABILITATION

## 2022-08-28 PROCEDURE — 6370000000 HC RX 637 (ALT 250 FOR IP): Performed by: PHYSICAL MEDICINE & REHABILITATION

## 2022-08-28 PROCEDURE — 36415 COLL VENOUS BLD VENIPUNCTURE: CPT

## 2022-08-28 PROCEDURE — 94640 AIRWAY INHALATION TREATMENT: CPT

## 2022-08-28 PROCEDURE — 80202 ASSAY OF VANCOMYCIN: CPT

## 2022-08-28 PROCEDURE — 80053 COMPREHEN METABOLIC PANEL: CPT

## 2022-08-28 RX ORDER — ALBUTEROL SULFATE 90 UG/1
2 AEROSOL, METERED RESPIRATORY (INHALATION) 2 TIMES DAILY
Status: DISCONTINUED | OUTPATIENT
Start: 2022-08-28 | End: 2022-09-07 | Stop reason: HOSPADM

## 2022-08-28 RX ORDER — SODIUM CHLORIDE 0.9 % (FLUSH) 0.9 %
10 SYRINGE (ML) INJECTION 2 TIMES DAILY
Status: DISCONTINUED | OUTPATIENT
Start: 2022-08-28 | End: 2022-09-07 | Stop reason: HOSPADM

## 2022-08-28 RX ORDER — ALBUTEROL SULFATE 90 UG/1
2 AEROSOL, METERED RESPIRATORY (INHALATION) EVERY 6 HOURS PRN
Status: DISCONTINUED | OUTPATIENT
Start: 2022-08-28 | End: 2022-09-07 | Stop reason: HOSPADM

## 2022-08-28 RX ORDER — ALBUTEROL SULFATE 90 UG/1
2 AEROSOL, METERED RESPIRATORY (INHALATION) 2 TIMES DAILY
Status: DISCONTINUED | OUTPATIENT
Start: 2022-08-28 | End: 2022-08-28

## 2022-08-28 RX ORDER — CARVEDILOL 12.5 MG/1
12.5 TABLET ORAL 2 TIMES DAILY WITH MEALS
Status: DISCONTINUED | OUTPATIENT
Start: 2022-08-28 | End: 2022-08-30

## 2022-08-28 RX ADMIN — Medication 6 MG: at 21:35

## 2022-08-28 RX ADMIN — CARVEDILOL 12.5 MG: 12.5 TABLET, FILM COATED ORAL at 18:27

## 2022-08-28 RX ADMIN — GUAIFENESIN 1200 MG: 600 TABLET ORAL at 09:49

## 2022-08-28 RX ADMIN — APIXABAN 5 MG: 5 TABLET, FILM COATED ORAL at 09:49

## 2022-08-28 RX ADMIN — ACETAMINOPHEN 650 MG: 325 TABLET ORAL at 16:04

## 2022-08-28 RX ADMIN — AMIODARONE HYDROCHLORIDE 200 MG: 200 TABLET ORAL at 09:49

## 2022-08-28 RX ADMIN — FUROSEMIDE 40 MG: 40 TABLET ORAL at 09:49

## 2022-08-28 RX ADMIN — SODIUM CHLORIDE, PRESERVATIVE FREE 10 ML: 5 INJECTION INTRAVENOUS at 19:46

## 2022-08-28 RX ADMIN — ACETAMINOPHEN 650 MG: 325 TABLET ORAL at 09:48

## 2022-08-28 RX ADMIN — ALBUTEROL SULFATE 2 PUFF: 90 AEROSOL, METERED RESPIRATORY (INHALATION) at 17:45

## 2022-08-28 RX ADMIN — MOMETASONE FUROATE AND FORMOTEROL FUMARATE DIHYDRATE 2 PUFF: 100; 5 AEROSOL RESPIRATORY (INHALATION) at 17:45

## 2022-08-28 RX ADMIN — HYDROXYZINE PAMOATE 25 MG: 25 CAPSULE ORAL at 21:34

## 2022-08-28 RX ADMIN — INSULIN GLARGINE 14 UNITS: 100 INJECTION, SOLUTION SUBCUTANEOUS at 21:45

## 2022-08-28 RX ADMIN — CARVEDILOL 25 MG: 25 TABLET, FILM COATED ORAL at 09:49

## 2022-08-28 RX ADMIN — PANTOPRAZOLE SODIUM 40 MG: 40 TABLET, DELAYED RELEASE ORAL at 06:00

## 2022-08-28 RX ADMIN — ANTI-FUNGAL POWDER MICONAZOLE NITRATE TALC FREE: 1.42 POWDER TOPICAL at 09:55

## 2022-08-28 RX ADMIN — NAPROXEN 500 MG: 250 TABLET ORAL at 09:48

## 2022-08-28 RX ADMIN — ATORVASTATIN CALCIUM 20 MG: 20 TABLET, FILM COATED ORAL at 21:35

## 2022-08-28 RX ADMIN — PANTOPRAZOLE SODIUM 40 MG: 40 TABLET, DELAYED RELEASE ORAL at 16:04

## 2022-08-28 RX ADMIN — GUAIFENESIN 1200 MG: 600 TABLET ORAL at 21:35

## 2022-08-28 RX ADMIN — APIXABAN 5 MG: 5 TABLET, FILM COATED ORAL at 21:34

## 2022-08-28 RX ADMIN — VANCOMYCIN HYDROCHLORIDE 1000 MG: 1 INJECTION, POWDER, LYOPHILIZED, FOR SOLUTION INTRAVENOUS at 06:00

## 2022-08-28 RX ADMIN — CLOPIDOGREL BISULFATE 75 MG: 75 TABLET ORAL at 09:48

## 2022-08-28 RX ADMIN — MONTELUKAST 10 MG: 10 TABLET, FILM COATED ORAL at 21:38

## 2022-08-28 RX ADMIN — FLUTICASONE PROPIONATE 1 SPRAY: 50 SPRAY, METERED NASAL at 09:55

## 2022-08-28 RX ADMIN — NAPROXEN 500 MG: 250 TABLET ORAL at 18:27

## 2022-08-28 RX ADMIN — VANCOMYCIN HYDROCHLORIDE 750 MG: 750 INJECTION, POWDER, LYOPHILIZED, FOR SOLUTION INTRAVENOUS at 18:27

## 2022-08-28 RX ADMIN — CEFTRIAXONE 2000 MG: 2 INJECTION, POWDER, FOR SOLUTION INTRAMUSCULAR; INTRAVENOUS at 16:06

## 2022-08-28 RX ADMIN — MOMETASONE FUROATE AND FORMOTEROL FUMARATE DIHYDRATE 2 PUFF: 100; 5 AEROSOL RESPIRATORY (INHALATION) at 05:24

## 2022-08-28 RX ADMIN — ACETAMINOPHEN 650 MG: 325 TABLET ORAL at 21:34

## 2022-08-28 RX ADMIN — LATANOPROST 1 DROP: 50 SOLUTION OPHTHALMIC at 21:36

## 2022-08-28 RX ADMIN — CALCITONIN SALMON 1 SPRAY: 200 SPRAY, METERED NASAL at 09:54

## 2022-08-28 ASSESSMENT — PAIN DESCRIPTION - LOCATION: LOCATION: BACK

## 2022-08-28 ASSESSMENT — PAIN DESCRIPTION - ORIENTATION: ORIENTATION: LOWER

## 2022-08-28 ASSESSMENT — PAIN SCALES - GENERAL
PAINLEVEL_OUTOF10: 6
PAINLEVEL_OUTOF10: 3
PAINLEVEL_OUTOF10: 0

## 2022-08-28 ASSESSMENT — PAIN DESCRIPTION - PAIN TYPE: TYPE: ACUTE PAIN

## 2022-08-28 ASSESSMENT — PAIN DESCRIPTION - DESCRIPTORS: DESCRIPTORS: ACHING

## 2022-08-28 NOTE — PROGRESS NOTES
VANCO DAILY FOLLOW UP NOTE  [address] [facility] Pharmacokinetic Monitoring Service - Vancomycin    Consulting Provider: Dr. Linares Erp  Indication: L2-L4 osteodiscitis and psoas abscess  Target Concentration: Goal AUC/GIOVANNY 400-600 mg*hr/L  Day of Therapy: EOT 9/9/22  Additional Antimicrobials: ceftriaxone    Pertinent Laboratory Values: Wt Readings from Last 1 Encounters:   08/26/22 237 lb (107.5 kg)     Temp Readings from Last 1 Encounters:   08/28/22 98.2 °F (36.8 °C) (Oral)     Recent Labs     08/25/22  1911 08/28/22  0405   BUN 16 19   CREATININE 1.00 1.00   WBC  --  6.5     Estimated Creatinine Clearance: 78 mL/min (based on SCr of 1 mg/dL). Lab Results   Component Value Date/Time    VANCOTROUGH 18.2 08/26/2022 07:30 AM    VANCORANDOM 20.2 08/28/2022 12:06 AM     MRSA Nasal Swab: N/A. Non-respiratory infection.     Assessment:  Date/Time Dose Concentration AUC   8/26 0730 1000 mg q12h Tr = 18.2 [facility]   8/28 0006  1000 mg q12h 20.2 570   Note: Serum concentrations collected for AUC dosing may appear elevated if collected in close proximity to the dose administered, this is not necessarily an indication of toxicity    Plan:  Current dosing regimen is high end of therapeutic  Decrease dose to 750 mg q12h for predicted AUC/Tr of 437/14.8  Repeat vancomycin concentrations will be ordered as clinically appropriate   Pharmacy will continue to monitor patient and adjust therapy as indicated    Thank you for the consult,  Maye [facility]

## 2022-08-28 NOTE — PROGRESS NOTES
Pt with BP drop after BP meds being auto held by MD. Dr. Bright Fuelnicolás notified.  Orders to change Coreg from 25 to 12.5

## 2022-08-28 NOTE — PROGRESS NOTES
Physical Medicine and Rehabilitation Progress Note    Angela Mendosa  Admit Date: 8/26/2022  Admit Diagnosis: Physical debility [R53.81]    Subjective:Patient seen face-to-face. No pain complaints. Denies lightheadedness, SOB or nausea. Participating in therapy. Mobility at mod A level.     Objective:     Current Facility-Administered Medications   Medication Dose Route Frequency    miconazole (MICOTIN) 2 % powder   Topical BID    acetaminophen (TYLENOL) tablet 650 mg  650 mg Oral TID    albuterol sulfate HFA (PROVENTIL;VENTOLIN;PROAIR) 108 (90 Base) MCG/ACT inhaler 2 puff  2 puff Inhalation Q4H PRN    amiodarone (CORDARONE) tablet 200 mg  200 mg Oral Daily    [Held by provider] amLODIPine (NORVASC) tablet 5 mg  5 mg Oral Daily    apixaban (ELIQUIS) tablet 5 mg  5 mg Oral BID    atorvastatin (LIPITOR) tablet 20 mg  20 mg Oral Nightly    bisacodyl (DULCOLAX) suppository 10 mg  10 mg Rectal Daily PRN    calcitonin (MIACALCIN) nasal spray 1 spray  1 spray Alternating Nares Daily    carvedilol (COREG) tablet 25 mg  25 mg Oral BID WC    clopidogrel (PLAVIX) tablet 75 mg  75 mg Oral Daily    fluticasone (FLONASE) 50 MCG/ACT nasal spray 1 spray  1 spray Each Nostril Daily    furosemide (LASIX) tablet 40 mg  40 mg Oral Daily    guaiFENesin (MUCINEX) extended release tablet 1,200 mg  1,200 mg Oral BID    hydrOXYzine pamoate (VISTARIL) capsule 25 mg  25 mg Oral Nightly    insulin glargine (LANTUS) injection vial 14 Units  14 Units SubCUTAneous Nightly    insulin lispro (HUMALOG) injection vial 0-16 Units  0-16 Units SubCUTAneous TID     insulin lispro (HUMALOG) injection vial 0-4 Units  0-4 Units SubCUTAneous Nightly    latanoprost (XALATAN) 0.005 % ophthalmic solution 1 drop  1 drop Both Eyes Nightly    lidocaine 4 % external patch 1 patch  1 patch TransDERmal Daily    [Held by provider] losartan (COZAAR) tablet 100 mg  100 mg Oral Daily    melatonin tablet 6 mg  6 mg Oral Nightly    montelukast (SINGULAIR) tablet 10 mg 10 mg Oral Nightly    naproxen (NAPROSYN) tablet 500 mg  500 mg Oral BID WC    pantoprazole (PROTONIX) tablet 40 mg  40 mg Oral BID AC    polyethylene glycol (GLYCOLAX) packet 17 g  17 g Oral Daily    sennosides-docusate sodium (SENOKOT-S) 8.6-50 MG tablet 2 tablet  2 tablet Oral Daily PRN    glucose chewable tablet 16 g  4 tablet Oral PRN    dextrose bolus 10% 125 mL  125 mL IntraVENous PRN    Or    dextrose bolus 10% 250 mL  250 mL IntraVENous PRN    glucagon (rDNA) injection 1 mg  1 mg SubCUTAneous PRN    dextrose 10 % infusion   IntraVENous Continuous PRN    mometasone-formoterol (DULERA) 100-5 MCG/ACT inhaler 2 puff  2 puff Inhalation BID    cefTRIAXone (ROCEPHIN) 2,000 mg in sodium chloride 0.9 % 50 mL IVPB mini-bag  2,000 mg IntraVENous Q24H    vancomycin (VANCOCIN) 1,000 mg in sodium chloride 0.9 % 250 mL IVPB ([id number])  1,000 mg IntraVENous Q12H     Allergies   Allergen Reactions    Azithromycin Hives    Oxaprozin Other (See Comments)     ELEVATED BLOOD PRESSURE       Visit Vitals  /68   Pulse 60   Temp 98.2 °F (36.8 °C)   Resp 19   Ht 6' 1\" (1.854 m)   Wt 237 lb (107.5 kg)   SpO2 93%   BMI 31.27 kg/m²      Intake and Output:  08/26 1901 - 08/28 0700  In: -   Out: 7488 [Urine:1075]      Physical Exam:   General:  Alert, NAD, resting comfortably in bed   HEENT:  Normocephalic, EOM intact, facial symmetry noted. Nares patent, oral mucosa moist without lesions. Lungs:  Clear to auscultation bilaterally. Respirations even and unlabored. Heart:  Regular rate and underlying rhythm, S1, S2. No obvious murmur    Genitourinary: Deferred. Abdomen:  Soft, non-tender, not distended. Bowel sounds normoactive.    Neuromuscular:  Generalized prox>distal weakness in the Ues  RLE hip flexion 1+ due to pain and weakness  RLeKE 4-, KF 3, DF 4, PF 5  LLE hip flexion 2, KE 4, KF 4-, DF 5, PF 5  Sensation intact      Skin:  Intact, dry, good skin turgor, age related changes present   Edema: none       Functional Assessment:  @BSHSIFLOW(3001)@   @BSHSIFLOW(2865)@   @BSHSIFLOW(2381)@   @BSHSIFLOW(2584)@   @BSHSIFLOW(2648)@   @BSHSIFLOW(2621)@   @BSHSIFLOW(5743)@     Holli Fall Risk Assessment:  @BSHSIFLOW(3538)@     Speech Assessment:  @BSHSIFLOW(2335)@      Ambulation:  @BSHSIFLOW(0281)@     Labs/Studies:  Recent Results (from the past 72 hour(s))   COVID-19, Rapid    Collection Time: 08/25/22  9:00 AM    Specimen: Nasopharyngeal   Result Value Ref Range    Source NASAL      SARS-CoV-2, Rapid Not detected NOTD     Basic Metabolic Panel    Collection Time: 08/25/22  7:11 PM   Result Value Ref Range    Sodium 138 136 - 145 mmol/L    Potassium 3.4 (L) 3.5 - 5.1 mmol/L    Chloride 102 98 - 107 mmol/L    CO2 30 21 - 32 mmol/L    Anion Gap 6 (L) 7 - 16 mmol/L    Glucose 205 (H) 65 - 100 mg/dL    BUN 16 8 - 23 MG/DL    Creatinine 1.00 0.8 - 1.5 MG/DL    GFR African American >60 >60 ml/min/1.73m2    GFR Non- >60 >60 ml/min/1.73m2    Calcium 8.3 8.3 - 10.4 MG/DL   Vancomycin Level, Trough    Collection Time: 08/26/22  7:30 AM   Result Value Ref Range    Vancomycin Tr 18.2 5 - 20 ug/mL   POCT Glucose    Collection Time: 08/26/22  5:12 PM   Result Value Ref Range    POC Glucose 181 (H) 65 - 100 mg/dL    Performed by: [email or web address]    POCT Glucose    Collection Time: 08/26/22  8:45 PM   Result Value Ref Range    POC Glucose 202 (H) 65 - 100 mg/dL    Performed by: [email or web address]    POCT Glucose    Collection Time: 08/27/22  6:46 AM   Result Value Ref Range    POC Glucose 127 (H) 65 - 100 mg/dL    Performed by: [email or web address]    POCT Glucose    Collection Time: 08/27/22 11:42 AM   Result Value Ref Range    POC Glucose 209 (H) 65 - 100 mg/dL    Performed by: Sean    POCT Glucose    Collection Time: 08/27/22  4:18 PM   Result Value Ref Range    POC Glucose 158 (H) 65 - 100 mg/dL    Performed by: Sean    POCT Glucose    Collection Time: 08/27/22 10:04 PM   Result Value Ref Range    POC Glucose 151 (H) 65 - 100 mg/dL    Performed by: Tamiko    Vancomycin Level, Random    Collection Time: 08/28/22 12:06 AM   Result Value Ref Range    Vancomycin Rm 20.2 UG/ML   CBC    Collection Time: 08/28/22  4:05 AM   Result Value Ref Range    WBC 6.5 4.3 - 11.1 K/uL    RBC 2.56 (L) 4.23 - 5.6 M/uL    Hemoglobin 7.9 (L) 13.6 - 17.2 g/dL    Hematocrit 24.7 (L) 41.1 - 50.3 %    MCV 96.5 79.6 - 97.8 FL    MCH 30.9 26.1 - 32.9 PG    MCHC 32.0 31.4 - 35.0 g/dL    RDW 16.7 (H) 11.9 - 14.6 %    Platelets 646 263 - 600 K/uL    MPV 10.5 9.4 - 12.3 FL    nRBC 0.00 0.0 - 0.2 K/uL   Comprehensive Metabolic Panel    Collection Time: 08/28/22  4:05 AM   Result Value Ref Range    Sodium 139 136 - 145 mmol/L    Potassium 3.7 3.5 - 5.1 mmol/L    Chloride 107 98 - 107 mmol/L    CO2 28 21 - 32 mmol/L    Anion Gap 4 (L) 7 - 16 mmol/L    Glucose 125 (H) 65 - 100 mg/dL    BUN 19 8 - 23 MG/DL    Creatinine 1.00 0.8 - 1.5 MG/DL    GFR African American >60 >60 ml/min/1.73m2    GFR Non- >60 >60 ml/min/1.73m2    Calcium 8.5 8.3 - 10.4 MG/DL    Total Bilirubin 0.5 0.2 - 1.1 MG/DL    ALT 54 12 - 65 U/L    AST 23 15 - 37 U/L    Alk Phosphatase 112 50 - 136 U/L    Total Protein 5.8 (L) 6.3 - 8.2 g/dL    Albumin 2.3 (L) 3.2 - 4.6 g/dL    Globulin 3.5 2.3 - 3.5 g/dL    Albumin/Globulin Ratio 0.7 (L) 1.2 - 3.5     POCT Glucose    Collection Time: 08/28/22  6:42 AM   Result Value Ref Range    POC Glucose 125 (H) 65 - 100 mg/dL    Performed by: Tamiko      Assessment:  Physical Debility s/p lumbar osteomyelitis and psoas abscess      Plan / Recommendations / Medical Decision Making:      Daily physician / PA medical management:     Physical debility [R53.81] - PT/OT daily, 3hrs/d at least 5d/weak. I have no doubt that pt can actively participate and tolerate as well as benefit from aggressive rehab     Hypo/HTN - BP fluctuating, managed medically.  Cont Norvasc, coreg and lasix as well as cozaar   - 8/28 /68 this am, yesterday asymptomatic LOW BP - 93/58, 103/52, will hold cozaar, norvasc, continue to monitor, adjust as needed     ID; cont rocephin through 9/9, already completed 6 weeks of abx  - 8/28 wbc - 6.5    Pain management - ongoing significant pain in back and paresthesic neuropathic pain  which is stable and controlled by PRN meds. Will require regular pain assessment and comprehensive pain management. Will add Neurontin 100mg tid  - 8/28 continue scheduled tylenol tid, naproxen bid, lidoderm patch     Electrolyte abnormality - hypokalemia with a K of 3.4 on 8/25; replace and monitor.   - 8/28 K - 3.7     HLD cont lipitor     CM s/p ICD; compensated     Afib; cont amiodarone 200mg daily. Cont eliquis 5mg bid     Anemia; hbg 8.8 on 8/22  - 8/28 Hgb - 7.9, recheck cbc in am     COPD; compensated; cont dulera, singulair and mucinex; well compensated     Pneumonia prophylaxis - incentive spirometer every hour while awake. DVT risk / DVT prophylaxis - daily physician / PA exam to assess as patient is at increased risk for of thromboembolism. Mobilize as tolerated. Sequential pneumatic compression devices (SCDs) when in bed; thigh-high or knee-high thromboembolic deterrent hose when out of bed. Eliquis       DM; cont  SS, lantus 24u daily at bedtime. Adjust as     needed  - 8/28 BG - 125 this am, yesterday 127, 209,158, 151     GI prophylaxis - resume PPI. At times may need additional antacids, Maalox prn. History of tobacco use - stress abstinence, education. Reportedly quit 15 yrs ago but continues with tobacco chew     General skin care / wound prevention - monitor  general skin wound status daily per staff and physician / PA. At risk for failure due to impaired mobility. Bladder program / urinary retention / neurogenic bladder - schedule voids q6-8h. Check post-void residual as needed; in-and-out catheter if post-void residual is more than 400ml.      Bowel program - at risk for constipation as a side effect of opioids, other medications, impaired mobility, etc. MiraLAX daily for regularity, Joanna-Colace for stool softener. PRN MOM, bisacodyl suppository or tablets for constipation. Time spent was 25 minutes with over 1/2 in direct patient care/examination, consultation and coordination of care.     Signed By: Adelaida Parkinson MD     August 28, 2022

## 2022-08-29 PROBLEM — G89.29 CHRONIC PAIN OF RIGHT KNEE: Status: ACTIVE | Noted: 2017-12-14

## 2022-08-29 PROBLEM — F17.211 CIGARETTE NICOTINE DEPENDENCE IN REMISSION: Status: ACTIVE | Noted: 2018-08-20

## 2022-08-29 PROBLEM — M25.561 CHRONIC PAIN OF RIGHT KNEE: Status: ACTIVE | Noted: 2017-12-14

## 2022-08-29 LAB
ERYTHROCYTE [DISTWIDTH] IN BLOOD BY AUTOMATED COUNT: 16.8 % (ref 11.9–14.6)
GLUCOSE BLD STRIP.AUTO-MCNC: 138 MG/DL (ref 65–100)
GLUCOSE BLD STRIP.AUTO-MCNC: 168 MG/DL (ref 65–100)
GLUCOSE BLD STRIP.AUTO-MCNC: 192 MG/DL (ref 65–100)
GLUCOSE BLD STRIP.AUTO-MCNC: 280 MG/DL (ref 65–100)
HCT VFR BLD AUTO: 24.2 % (ref 41.1–50.3)
HGB BLD-MCNC: 7.8 G/DL (ref 13.6–17.2)
MCH RBC QN AUTO: 31.5 PG (ref 26.1–32.9)
MCHC RBC AUTO-ENTMCNC: 32.2 G/DL (ref 31.4–35)
MCV RBC AUTO: 97.6 FL (ref 79.6–97.8)
NRBC # BLD: 0 K/UL (ref 0–0.2)
PLATELET # BLD AUTO: 246 K/UL (ref 150–450)
PMV BLD AUTO: 10.5 FL (ref 9.4–12.3)
RBC # BLD AUTO: 2.48 M/UL (ref 4.23–5.6)
SERVICE CMNT-IMP: ABNORMAL
WBC # BLD AUTO: 6.2 K/UL (ref 4.3–11.1)

## 2022-08-29 PROCEDURE — 92523 SPEECH SOUND LANG COMPREHEN: CPT

## 2022-08-29 PROCEDURE — 2580000003 HC RX 258: Performed by: PHYSICAL MEDICINE & REHABILITATION

## 2022-08-29 PROCEDURE — 94660 CPAP INITIATION&MGMT: CPT

## 2022-08-29 PROCEDURE — 97110 THERAPEUTIC EXERCISES: CPT

## 2022-08-29 PROCEDURE — 94760 N-INVAS EAR/PLS OXIMETRY 1: CPT

## 2022-08-29 PROCEDURE — 6360000002 HC RX W HCPCS: Performed by: PHYSICAL MEDICINE & REHABILITATION

## 2022-08-29 PROCEDURE — 97116 GAIT TRAINING THERAPY: CPT

## 2022-08-29 PROCEDURE — 82962 GLUCOSE BLOOD TEST: CPT

## 2022-08-29 PROCEDURE — 1180000000 HC REHAB R&B

## 2022-08-29 PROCEDURE — 94640 AIRWAY INHALATION TREATMENT: CPT

## 2022-08-29 PROCEDURE — 85027 COMPLETE CBC AUTOMATED: CPT

## 2022-08-29 PROCEDURE — 97535 SELF CARE MNGMENT TRAINING: CPT

## 2022-08-29 PROCEDURE — 36415 COLL VENOUS BLD VENIPUNCTURE: CPT

## 2022-08-29 PROCEDURE — 2700000000 HC OXYGEN THERAPY PER DAY

## 2022-08-29 PROCEDURE — 6370000000 HC RX 637 (ALT 250 FOR IP): Performed by: PHYSICAL MEDICINE & REHABILITATION

## 2022-08-29 PROCEDURE — 99232 SBSQ HOSP IP/OBS MODERATE 35: CPT | Performed by: PHYSICAL MEDICINE & REHABILITATION

## 2022-08-29 PROCEDURE — 6370000000 HC RX 637 (ALT 250 FOR IP): Performed by: PHYSICIAN ASSISTANT

## 2022-08-29 PROCEDURE — 97530 THERAPEUTIC ACTIVITIES: CPT

## 2022-08-29 RX ORDER — ONDANSETRON 4 MG/1
4 TABLET, ORALLY DISINTEGRATING ORAL EVERY 6 HOURS PRN
Status: DISCONTINUED | OUTPATIENT
Start: 2022-08-29 | End: 2022-09-07 | Stop reason: HOSPADM

## 2022-08-29 RX ORDER — TRAZODONE HYDROCHLORIDE 50 MG/1
25 TABLET ORAL NIGHTLY
Status: DISCONTINUED | OUTPATIENT
Start: 2022-08-29 | End: 2022-09-07 | Stop reason: HOSPADM

## 2022-08-29 RX ORDER — OXYCODONE HYDROCHLORIDE 5 MG/1
5 TABLET ORAL EVERY 4 HOURS PRN
Status: DISCONTINUED | OUTPATIENT
Start: 2022-08-29 | End: 2022-09-07 | Stop reason: HOSPADM

## 2022-08-29 RX ORDER — GABAPENTIN 100 MG/1
100 CAPSULE ORAL 3 TIMES DAILY
Status: DISCONTINUED | OUTPATIENT
Start: 2022-08-29 | End: 2022-08-31

## 2022-08-29 RX ADMIN — AMIODARONE HYDROCHLORIDE 200 MG: 200 TABLET ORAL at 08:14

## 2022-08-29 RX ADMIN — FUROSEMIDE 40 MG: 40 TABLET ORAL at 08:14

## 2022-08-29 RX ADMIN — NAPROXEN 500 MG: 250 TABLET ORAL at 07:17

## 2022-08-29 RX ADMIN — ALBUTEROL SULFATE 2 PUFF: 90 AEROSOL, METERED RESPIRATORY (INHALATION) at 05:59

## 2022-08-29 RX ADMIN — CLOPIDOGREL BISULFATE 75 MG: 75 TABLET ORAL at 08:14

## 2022-08-29 RX ADMIN — GABAPENTIN 100 MG: 100 CAPSULE ORAL at 08:16

## 2022-08-29 RX ADMIN — GUAIFENESIN 1200 MG: 600 TABLET ORAL at 08:14

## 2022-08-29 RX ADMIN — ALBUTEROL SULFATE 2 PUFF: 90 AEROSOL, METERED RESPIRATORY (INHALATION) at 16:24

## 2022-08-29 RX ADMIN — VANCOMYCIN HYDROCHLORIDE 750 MG: 750 INJECTION, POWDER, LYOPHILIZED, FOR SOLUTION INTRAVENOUS at 18:00

## 2022-08-29 RX ADMIN — CARVEDILOL 12.5 MG: 12.5 TABLET, FILM COATED ORAL at 17:20

## 2022-08-29 RX ADMIN — MONTELUKAST 10 MG: 10 TABLET, FILM COATED ORAL at 21:10

## 2022-08-29 RX ADMIN — ACETAMINOPHEN 650 MG: 325 TABLET ORAL at 14:38

## 2022-08-29 RX ADMIN — INSULIN GLARGINE 14 UNITS: 100 INJECTION, SOLUTION SUBCUTANEOUS at 21:07

## 2022-08-29 RX ADMIN — GABAPENTIN 100 MG: 100 CAPSULE ORAL at 14:28

## 2022-08-29 RX ADMIN — TRAZODONE HYDROCHLORIDE 25 MG: 50 TABLET ORAL at 21:10

## 2022-08-29 RX ADMIN — SENNOSIDES AND DOCUSATE SODIUM 2 TABLET: 8.6; 5 TABLET ORAL at 21:10

## 2022-08-29 RX ADMIN — CARVEDILOL 12.5 MG: 12.5 TABLET, FILM COATED ORAL at 08:14

## 2022-08-29 RX ADMIN — MOMETASONE FUROATE AND FORMOTEROL FUMARATE DIHYDRATE 2 PUFF: 100; 5 AEROSOL RESPIRATORY (INHALATION) at 05:59

## 2022-08-29 RX ADMIN — APIXABAN 5 MG: 5 TABLET, FILM COATED ORAL at 21:10

## 2022-08-29 RX ADMIN — BISACODYL 10 MG: 10 SUPPOSITORY RECTAL at 06:47

## 2022-08-29 RX ADMIN — CEFTRIAXONE 2000 MG: 2 INJECTION, POWDER, FOR SOLUTION INTRAMUSCULAR; INTRAVENOUS at 14:28

## 2022-08-29 RX ADMIN — OXYCODONE 5 MG: 5 TABLET ORAL at 07:32

## 2022-08-29 RX ADMIN — ATORVASTATIN CALCIUM 20 MG: 20 TABLET, FILM COATED ORAL at 21:10

## 2022-08-29 RX ADMIN — ACETAMINOPHEN 650 MG: 325 TABLET ORAL at 21:10

## 2022-08-29 RX ADMIN — VANCOMYCIN HYDROCHLORIDE 750 MG: 750 INJECTION, POWDER, LYOPHILIZED, FOR SOLUTION INTRAVENOUS at 06:02

## 2022-08-29 RX ADMIN — PANTOPRAZOLE SODIUM 40 MG: 40 TABLET, DELAYED RELEASE ORAL at 06:07

## 2022-08-29 RX ADMIN — ANTI-FUNGAL POWDER MICONAZOLE NITRATE TALC FREE: 1.42 POWDER TOPICAL at 21:26

## 2022-08-29 RX ADMIN — ACETAMINOPHEN 650 MG: 325 TABLET ORAL at 07:17

## 2022-08-29 RX ADMIN — POLYETHYLENE GLYCOL 3350 17 G: 17 POWDER, FOR SOLUTION ORAL at 08:17

## 2022-08-29 RX ADMIN — GUAIFENESIN 1200 MG: 600 TABLET ORAL at 21:10

## 2022-08-29 RX ADMIN — MOMETASONE FUROATE AND FORMOTEROL FUMARATE DIHYDRATE 2 PUFF: 100; 5 AEROSOL RESPIRATORY (INHALATION) at 16:24

## 2022-08-29 RX ADMIN — LATANOPROST 1 DROP: 50 SOLUTION OPHTHALMIC at 21:09

## 2022-08-29 RX ADMIN — CALCITONIN SALMON 1 SPRAY: 200 SPRAY, METERED NASAL at 08:22

## 2022-08-29 RX ADMIN — PANTOPRAZOLE SODIUM 40 MG: 40 TABLET, DELAYED RELEASE ORAL at 17:20

## 2022-08-29 RX ADMIN — SODIUM CHLORIDE, PRESERVATIVE FREE 10 ML: 5 INJECTION INTRAVENOUS at 21:09

## 2022-08-29 RX ADMIN — NAPROXEN 500 MG: 250 TABLET ORAL at 17:20

## 2022-08-29 RX ADMIN — APIXABAN 5 MG: 5 TABLET, FILM COATED ORAL at 08:14

## 2022-08-29 RX ADMIN — SODIUM CHLORIDE, PRESERVATIVE FREE 10 ML: 5 INJECTION INTRAVENOUS at 08:21

## 2022-08-29 RX ADMIN — GABAPENTIN 100 MG: 100 CAPSULE ORAL at 21:10

## 2022-08-29 RX ADMIN — Medication 6 MG: at 21:10

## 2022-08-29 ASSESSMENT — PAIN SCALES - GENERAL
PAINLEVEL_OUTOF10: 0
PAINLEVEL_OUTOF10: 8

## 2022-08-29 ASSESSMENT — PAIN DESCRIPTION - LOCATION: LOCATION: BACK

## 2022-08-29 ASSESSMENT — PAIN DESCRIPTION - ORIENTATION: ORIENTATION: RIGHT

## 2022-08-29 ASSESSMENT — PAIN DESCRIPTION - DESCRIPTORS: DESCRIPTORS: ACHING;THROBBING

## 2022-08-29 NOTE — PROGRESS NOTES
OT Daily Note  Time In 658   Time Out 720     Pain:  Pt reported pain was so bad in sitting that he could not participate. Dr Webb Patient notified. Functional Mobility   Pt was in bed and required max encouragement to partipate. Pt went supine to sit with mod A for trunk. Pt sat EOB for approximately 3 minutes before he said the pain was too bad and he could not participate. Encouraged pt to scoot forward so he could take some of the pressure off his back and onto his feet, but he would not attempt. Pt was S for sit to supine. Education   Benefits of movement     Interdisciplinary Communication: TEMI Ragsdale and Alfa Barroso and Dr Webb Patient on pt's pain    Plan: Continue with POC. Pt was left in bed with all needs within reach.      Chela Kim OTR/L  8/29/2022

## 2022-08-29 NOTE — PROGRESS NOTES
PHYSICAL THERAPY DAILY NOTE  Time In:  1350  Time Out:  1441  Total Treatment Time:  46 Minutes  Pt. Seen for: PM, Gait Training, Therapeutic Exercise, and Transfer Training     Subjective: Pt. Reports pain is much improved over this AM.         Objective:  Precautions: Falls    COGNITION Daily Assessment    Pt. Requires cues for recall of hand placement during transfers       BED/MAT MOBILITY Daily Assessment    Rolling Right: NT  Rolling Left: NT  Supine to Sit: NT  Sit to Supine: NT       TRANSFERS Daily Assessment    Sit to Stand: Min A  Stand to Sit: Min A  Transfer Type: Stand Pivot  Transfer Assistance: Min A  Car Transfers: NT         GAIT Daily Assessment   Pt. Exhibits flexed posture, decreased paul, knees flexed throughout gait cycle, & heavy reliance on UEs Amount of Assistance: Total A min A x1 & SBA x1 to follow w/ w/c closely  Distance (ft): 40'x1, 10'x1  Assistive Device: RW and Gait Belt  Surface: Level Surface       STEPS/STAIRS Daily Assessment   NT        BALANCE Daily Assessment    Static Sitting: Good:  Pt. able to maintain balance w/o UE support;  exhibits some postural sway  Dynamic Sitting: Good - accepts moderate challenge;  can maintain balance while picking object off the floor  Static Standing: Fair:  Pt. requires UE support;  may need occasional min A  Dynamic Standing: Fair - accepts minimal challenge;  can maintain balance while turning head/trunk       WHEELCHAIR MOBILITY Daily Assessment   NT        LOWER EXTREMITY EXERCISES Daily Assessment    Pt. Performed Motomed @ resistance level 3 x10 minutes to increase strength & endurance B LEs     Pain level: pt. Denies pain this PM    Vital Signs:  /62   Pulse 63   Temp 98.2 °F (36.8 °C) (Oral)   Resp 18   Ht 6' 1\" (1.854 m)   Wt 237 lb (107.5 kg)   SpO2 94%   BMI 31.27 kg/m²      Education:  pt. Educated on purpose of Motomed    Interdisciplinary Communication: collaborated w/ OT regarding pt's progress    Pt.  Left up in w/c in NAD, call bell in reach. Assessment: Pt. Able to increase gait distance this PM - pain much improved from AM session. Pt. Cont. To fatigue quickly w/ high risk of knees buckling & falling. Benefits from cont. PT to address. Plan of Care: Continue with POC and progress as tolerated.       Conrado Gu, PT  8/29/2022

## 2022-08-29 NOTE — PROGRESS NOTES
PHYSICAL THERAPY DAILY NOTE  Time In:  1115  Time Out:  1204  Total Treatment Time:   49 minutes  Pt. Seen for: AM, Gait Training, Patient Education, Transfer Training, and Other     Subjective: Patient reporting he has been hurting more. Reports the front of his hips are weak and the pain makes it difficult to transition sit<>stand. Reports he feels like his symptoms are getting worse since coming to rehab. Expressing frustration regarding inability to have a BM         Objective:  Precautions: Falls    GROSS ASSESSMENT Daily Assessment           COGNITION Daily Assessment    Alert, able to follow commands,cooperating,participating, motivated,          BED/MAT MOBILITY Daily Assessment   NT        TRANSFERS Daily Assessment   Increased time and effort to complete with cues for body mechanics  Improved body mechanics with less pain when pushing up instead of pulling up during sit to stand. W/C to commode SPT using grab bar with mod assist. Dependent assist with clothing management. RN in to assist with hygiene. Commode to w/c with mod assist x 2 during increased pain and fatigue from sitting on commode for 10 minutes. Sit to Stand: Mod A  Stand to Sit: Min A  Transfer Type: Stand Pivot  Transfer Assistance:  Mod A  Car Transfers: NT         GAIT Daily Assessment   Gait training with cues for upright posture  Amount of Assistance: Min A  Distance (ft): 10 ft x 5, and 1 set of 10 ft x 2  Assistive Device: Parallel Bars and Gait Belt  Surface: Level Surface       STEPS/STAIRS Daily Assessment    NA       BALANCE Daily Assessment    Static Sitting: Fair:  Pt. requires UE support;  may need occasional min A  Dynamic Sitting: Fair - accepts minimal challenge;  can maintain balance while turning head/trunk  Static Standing: Fair:  Pt. requires UE support;  may need occasional min A  Dynamic Standing: Poor - unable to accept challenge or move without LOB        WHEELCHAIR MOBILITY Daily Assessment   Changed patient to 25 x 18 wheelchair with tall back and ROHO cushion. Improved sitting posture with improved thigh support and improved back support NT       LOWER EXTREMITY EXERCISES Daily Assessment    NA     Pain level: 4 to 8 out of 10, increases with sit<>stand  Pain Location:  low back anterior hip and groin area  Pain Interventions: Medication per order, rest, positioning,body mechanics      Vital Signs:  Visit Vitals  BP (!) 147/73   Pulse 61   Temp 98.2 °F (36.8 °C) (Oral)   Resp 18   Ht 6' 1\" (1.854 m)   Wt 237 lb (107.5 kg)   SpO2 92%   BMI 31.27 kg/m²         Education:  transfer training, gait training, balance training,fall precautions, body mechanics,activity pacing,Patient verbalizing understanding and demonstrating understanding of patient education. Recommend follow up education. Interdisciplinary Communication: spoke with OT regarding functional status. Spoke with RN regarding pain medication schedule. RN in to assist in bathroom with toileting    Patient return to room at end of treatment and remained up in wheelchair with needs in reach. Wife in room with patient           Assessment: Improved standing and gait posture in II bars. Difficulty with sit<>stand due to pain and LE weakness. Sit<>stand improved with improved body mechanics. Improved sitting posture in new wheelchair         Plan of Care: Continue with POC and progress as tolerated.      Stephan Mohamud, PT  8/29/2022

## 2022-08-29 NOTE — PROGRESS NOTES
OT DAILY NOTE    Time In: 927   Time Out: 1004     Score Comments   Sit to Supine Partial/moderate assistance A for trunk   Sit to Stand Dependent Ax2   Transfer Assist Dependent Ax2     Oral Hygiene  Current Functional Level   Score Independent   Comments I     Lower Body   Dressing Current Functional Level     Functional Level Partial/moderate assistance   Comments A to pull pants over waist; reacher to thread underwear over feet     Donning/Selz  Footwear Current Functional Level     Functional Level Setup or clean-up assistance   Comments A to tie shoes     Summary of Session: Pt was in bed and agreeable to session with encouragement. Pt was limited during session due to pain. Pt appeared to be describing neuropathic pain. Ice pack was applied to back. Pt's performance was inconsistent during session. Pt needed reacher for underwear, but was able to don socks and shoes with encouragement. Pt was Ax2 to get out of bed, but was CGA to get in the recliner with he requested. Pt could have been warmed up making it easier. Pt was left in recliner with all needs within reach.      Angelina Guzman, OT  8/29/2022

## 2022-08-29 NOTE — CARE COORDINATION
Chart reviewed. No new CM needs noted at this time. CM to continue to follow and monitor for any further needs.

## 2022-08-29 NOTE — PROGRESS NOTES
[address]  Admit Date: 8/26/2022  Admit Diagnosis:   Physical debility [R53.81]    Subjective     Patient seen and examined between AM therapies, reports back pain has eased off enough after oxycodone IR and ice pack that he can participate in next PT. Reviewed PMH and events of this prolonged hospitalization. Reports general LS pain but also describes intermittent \"electrical flashes\" of sharp pain in pelvis and between both hips that occur several times per day; denies identifiable trigger, radiation into legs or b/b incontinence. Admits to difficulty sleeping in hospital, both from duran noise and intrusive thoughts; no history of insomnia PTA, tolerates CPAP at home. /73,  this AM. Hgb 7.8.     Objective:     Current Facility-Administered Medications   Medication Dose Route Frequency    gabapentin (NEURONTIN) capsule 100 mg  100 mg Oral TID    oxyCODONE (ROXICODONE) immediate release tablet 5 mg  5 mg Oral Q4H PRN    ondansetron (ZOFRAN-ODT) disintegrating tablet 4 mg  4 mg Oral Q6H PRN    [START ON 8/30/2022] vancomycin random level reminder   Other Once    vancomycin (VANCOCIN) 750 mg in sodium chloride 0.9 % 250 mL IVPB ([id number])  750 mg IntraVENous Q12H    albuterol sulfate HFA (PROVENTIL;VENTOLIN;PROAIR) 108 (90 Base) MCG/ACT inhaler 2 puff  2 puff Inhalation BID    albuterol sulfate HFA (PROVENTIL;VENTOLIN;PROAIR) 108 (90 Base) MCG/ACT inhaler 2 puff  2 puff Inhalation Q6H PRN    carvedilol (COREG) tablet 12.5 mg  12.5 mg Oral BID WC    sodium chloride flush 0.9 % injection 10 mL  10 mL IntraVENous BID    miconazole (MICOTIN) 2 % powder   Topical BID    acetaminophen (TYLENOL) tablet 650 mg  650 mg Oral TID    amiodarone (CORDARONE) tablet 200 mg  200 mg Oral Daily    [Held by provider] amLODIPine (NORVASC) tablet 5 mg  5 mg Oral Daily    apixaban (ELIQUIS) tablet 5 mg  5 mg Oral BID    atorvastatin (LIPITOR) tablet 20 mg  20 mg Oral Nightly    bisacodyl (DULCOLAX) suppository 10 mg  10 mg Rectal Daily PRN    calcitonin (MIACALCIN) nasal spray 1 spray  1 spray Alternating Nares Daily    clopidogrel (PLAVIX) tablet 75 mg  75 mg Oral Daily    fluticasone (FLONASE) 50 MCG/ACT nasal spray 1 spray  1 spray Each Nostril Daily    furosemide (LASIX) tablet 40 mg  40 mg Oral Daily    guaiFENesin (MUCINEX) extended release tablet 1,200 mg  1,200 mg Oral BID    hydrOXYzine pamoate (VISTARIL) capsule 25 mg  25 mg Oral Nightly    insulin glargine (LANTUS) injection vial 14 Units  14 Units SubCUTAneous Nightly    insulin lispro (HUMALOG) injection vial 0-16 Units  0-16 Units SubCUTAneous TID WC    insulin lispro (HUMALOG) injection vial 0-4 Units  0-4 Units SubCUTAneous Nightly    latanoprost (XALATAN) 0.005 % ophthalmic solution 1 drop  1 drop Both Eyes Nightly    lidocaine 4 % external patch 1 patch  1 patch TransDERmal Daily    [Held by provider] losartan (COZAAR) tablet 100 mg  100 mg Oral Daily    melatonin tablet 6 mg  6 mg Oral Nightly    montelukast (SINGULAIR) tablet 10 mg  10 mg Oral Nightly    naproxen (NAPROSYN) tablet 500 mg  500 mg Oral BID WC    pantoprazole (PROTONIX) tablet 40 mg  40 mg Oral BID AC    polyethylene glycol (GLYCOLAX) packet 17 g  17 g Oral Daily    sennosides-docusate sodium (SENOKOT-S) 8.6-50 MG tablet 2 tablet  2 tablet Oral Daily PRN    glucose chewable tablet 16 g  4 tablet Oral PRN    dextrose bolus 10% 125 mL  125 mL IntraVENous PRN    Or    dextrose bolus 10% 250 mL  250 mL IntraVENous PRN    glucagon (rDNA) injection 1 mg  1 mg SubCUTAneous PRN    dextrose 10 % infusion   IntraVENous Continuous PRN    mometasone-formoterol (DULERA) 100-5 MCG/ACT inhaler 2 puff  2 puff Inhalation BID    cefTRIAXone (ROCEPHIN) 2,000 mg in sodium chloride 0.9 % 50 mL IVPB mini-bag  2,000 mg IntraVENous Q24H        Review of Systems:   Denies chest pain, shortness of breath, cough, headache, visual problems, abdominal pain, dysuria, calf pain. Pertinent positives are as noted in the HPI, ROS unremarkable otherwise. Visit Vitals  BP (!) 147/73   Pulse 61   Temp 98.2 °F (36.8 °C) (Oral)   Resp 18   Ht 6' 1\" (1.854 m)   Wt 237 lb (107.5 kg)   SpO2 92%   BMI 31.27 kg/m²        Physical Exam:   General: Alert, appropriately oriented. OOB in recliner between therapies. HEENT: Normocephalic, EOM intact, poor eye contact. Oral mucosa moist.   Lungs: Coarse breath sounds bilaterally that clear with cough. Respirations even and unlabored. Heart: Slow-regular rate, regular underlying rhythm. No appreciable murmur but heart sounds muffled. Abdomen: Soft, non-tender, obese and protuberant but not distended. Bowel sounds normoactive, no organomegaly. Genitourinary: Deferred. Neuromuscular:      Speech clear, thoughts cogent. UE strength at biceps, triceps and finger flexion 4+/5 and symmetric. LE strength decreased proximally, HF 3-/5 (R), 3+/5 (L), with burning pain induced with movement; KE/KF 4/5, DF 4+/5 and symmetric bilaterally. Distal medial R LE with decreased sensation. Skin/extremity: No rashes, no erythema. Calves soft, non-tender B LE. Posterior trunk incision covered.        Functional Assessment:  Per PT: ambulated in parallel bars  Per OT: independent with oral hygiene, mod A for LB dressing, set-up A for donning footwear    Riley Fall Risk Assessment:  History of Falling: Yes     Labs/Studies:  Recent Results (from the past 72 hour(s))   POCT Glucose    Collection Time: 08/26/22  5:12 PM   Result Value Ref Range    POC Glucose 181 (H) 65 - 100 mg/dL    Performed by: Sean    POCT Glucose    Collection Time: 08/26/22  8:45 PM   Result Value Ref Range    POC Glucose 202 (H) 65 - 100 mg/dL    Performed by: HilaryitaPCMARGARITA    POCT Glucose    Collection Time: 08/27/22  6:46 AM   Result Value Ref Range    POC Glucose 127 (H) 65 - 100 mg/dL    Performed by: RenOxiCoolitaPCA    POCT Glucose    Collection Time: 08/27/22 11:42 AM   Result Value Ref Range    POC Glucose 209 (H) 65 - 100 mg/dL    Performed by: Sean    POCT Glucose    Collection Time: 08/27/22  4:18 PM   Result Value Ref Range    POC Glucose 158 (H) 65 - 100 mg/dL    Performed by: Sean    POCT Glucose    Collection Time: 08/27/22 10:04 PM   Result Value Ref Range    POC Glucose 151 (H) 65 - 100 mg/dL    Performed by: Tamiko    Vancomycin Level, Random    Collection Time: 08/28/22 12:06 AM   Result Value Ref Range    Vancomycin Rm 20.2 UG/ML   CBC    Collection Time: 08/28/22  4:05 AM   Result Value Ref Range    WBC 6.5 4.3 - 11.1 K/uL    RBC 2.56 (L) 4.23 - 5.6 M/uL    Hemoglobin 7.9 (L) 13.6 - 17.2 g/dL    Hematocrit 24.7 (L) 41.1 - 50.3 %    MCV 96.5 79.6 - 97.8 FL    MCH 30.9 26.1 - 32.9 PG    MCHC 32.0 31.4 - 35.0 g/dL    RDW 16.7 (H) 11.9 - 14.6 %    Platelets 160 090 - 042 K/uL    MPV 10.5 9.4 - 12.3 FL    nRBC 0.00 0.0 - 0.2 K/uL   Comprehensive Metabolic Panel    Collection Time: 08/28/22  4:05 AM   Result Value Ref Range    Sodium 139 136 - 145 mmol/L    Potassium 3.7 3.5 - 5.1 mmol/L    Chloride 107 98 - 107 mmol/L    CO2 28 21 - 32 mmol/L    Anion Gap 4 (L) 7 - 16 mmol/L    Glucose 125 (H) 65 - 100 mg/dL    BUN 19 8 - 23 MG/DL    Creatinine 1.00 0.8 - 1.5 MG/DL    GFR African American >60 >60 ml/min/1.73m2    GFR Non- >60 >60 ml/min/1.73m2    Calcium 8.5 8.3 - 10.4 MG/DL    Total Bilirubin 0.5 0.2 - 1.1 MG/DL    ALT 54 12 - 65 U/L    AST 23 15 - 37 U/L    Alk Phosphatase 112 50 - 136 U/L    Total Protein 5.8 (L) 6.3 - 8.2 g/dL    Albumin 2.3 (L) 3.2 - 4.6 g/dL    Globulin 3.5 2.3 - 3.5 g/dL    Albumin/Globulin Ratio 0.7 (L) 1.2 - 3.5     POCT Glucose    Collection Time: 08/28/22  6:42 AM   Result Value Ref Range    POC Glucose 125 (H) 65 - 100 mg/dL    Performed by:  Tamiko    POCT Glucose    Collection Time: 08/28/22 12:05 PM   Result Value Ref Range    POC Glucose 164 (H) 65 - 100 mg/dL    Performed by: Yair Abdi    POCT Glucose    Collection Time: 08/28/22  4:53 PM   Result Value Ref Range    POC Glucose 188 (H) 65 - 100 mg/dL    Performed by: Sean    POCT Glucose    Collection Time: 08/28/22  9:45 PM   Result Value Ref Range    POC Glucose 195 (H) 65 - 100 mg/dL    Performed by: Tamiko    CBC    Collection Time: 08/29/22  5:11 AM   Result Value Ref Range    WBC 6.2 4.3 - 11.1 K/uL    RBC 2.48 (L) 4.23 - 5.6 M/uL    Hemoglobin 7.8 (L) 13.6 - 17.2 g/dL    Hematocrit 24.2 (L) 41.1 - 50.3 %    MCV 97.6 79.6 - 97.8 FL    MCH 31.5 26.1 - 32.9 PG    MCHC 32.2 31.4 - 35.0 g/dL    RDW 16.8 (H) 11.9 - 14.6 %    Platelets 629 430 - 619 K/uL    MPV 10.5 9.4 - 12.3 FL    nRBC 0.00 0.0 - 0.2 K/uL   POCT Glucose    Collection Time: 08/29/22  6:21 AM   Result Value Ref Range    POC Glucose 138 (H) 65 - 100 mg/dL    Performed by: Tamiko        Assessment:     Principal Problem:    Physical debility  Active Problems:    ICD (implantable cardioverter-defibrillator) in place    General weakness    Back pain    Anemia    Atrial fibrillation (HCC)    Complicated wound infection    HTN (hypertension)    Pulmonary emphysema (HCC)    COPD (chronic obstructive pulmonary disease) (HCC)    Cigarette nicotine dependence in remission    Diabetic neuropathy (HCC)    Chronic pain of right knee    Ischemic cardiomyopathy    PAD (peripheral artery disease) (HCC)    Hyperlipidemia    DM (diabetes mellitus) type II, controlled, with peripheral vascular disorder (HCC)    Obstructive sleep apnea    MINI (acute kidney injury) ([facility] [address]  Resolved Problems:    * No resolved hospital problems. 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  I have no doubt that pt can actively participate and tolerate as well as benefit from

## 2022-08-29 NOTE — PROGRESS NOTES
VANCO DAILY FOLLOW UP NOTE  [address] [facility] Pharmacokinetic Monitoring Service - Vancomycin    Consulting Provider: Dr. Christy Mclain  Indication: L2-L4 osteodiscitis and psoas abscess  Target Concentration: Goal AUC/GIOVANNY 400-600 mg*hr/L  Day of Therapy: EOT 9/9/22  Additional Antimicrobials: ceftriaxone    Pertinent Laboratory Values: Wt Readings from Last 1 Encounters:   08/26/22 237 lb (107.5 kg)     Temp Readings from Last 1 Encounters:   08/29/22 98.2 °F (36.8 °C) (Oral)     Recent Labs     08/28/22  0405 08/29/22  0511   BUN 19  --    CREATININE 1.00  --    WBC 6.5 6.2     Estimated Creatinine Clearance: 78 mL/min (based on SCr of 1 mg/dL). Lab Results   Component Value Date/Time    VANCOTROUGH 18.2 08/26/2022 07:30 AM    VANCORANDOM 20.2 08/28/2022 12:06 AM     MRSA Nasal Swab: N/A. Non-respiratory infection. Assessment:  Date/Time Dose Concentration AUC   8/26 0730 1000 mg q12h Tr = 18.2 [facility]   8/28 0006  1000 mg q12h 20.2 570   Note: Serum concentrations collected for AUC dosing may appear elevated if collected in close proximity to the dose administered, this is not necessarily an indication of toxicity    Plan:  Continue vancomycin 750 mg IV q12h for predicted AUC/Tr of 437/14.8  Repeat vancomycin concentration ordered for 8/30 @ 0400  Pharmacy will continue to monitor patient and adjust therapy as indicated    Thank you for the consult,  Amanda Coon, [address] [facility]

## 2022-08-29 NOTE — PROGRESS NOTES
GOALS:   STG: Patient will complete functional verbal/visual reasoning activities with 80% accuracy with minimal assistance  STG: Patient will complete calculations with 80% accuracy with minimal assistance. STG: Patient will complete functional attention tasks with 80% accuracy with minimal assistance. STG: Patient will complete basic mental manipulation tasks with 80% accuracy with minimal assistance. STG: Patient will completed divergent naming tasks with 80% accuracy with minimal assistance. LTG: Patient will increase neuro-linguistic abilities to increase safety and awareness of deficits. SPEECH LANGUAGE PATHOLOGY: COMMUNICATION Initial Assessment    MRN: [id number]    ADMISSION DATE: 8/26/2022  PRIMARY DIAGNOSIS: Physical debility  Physical debility [R53.81]    ICD-10: Treatment Diagnosis: R41.841 Cognitive-Communication Deficit    RECOMMENDATIONS:   Recommendations: Follow up cognitive assessment     Patient continues to require skilled intervention:     Cognitive treatment     ASSESSMENT    Patient presents with mild cognitive deficits on the [address] (SLUMS) with a score of 22/30 and a norm score of greater than 26. Consult placed secondary to decreased cognition noted during admission by OT. Patient limited by pain this am and oxycodone also administered earlier today. Also reports getting almost no sleep last night reporting decreased ability to focus. Full assessment outlined below with f/u ST when patient is more readily able to participate indicated. GENERAL     Pain:         Pain at 5/10; oxycodone administered earlier this am          OBJECTIVE    SLUMS and other measures completed to evaluate cognitive linguistic functioning:    Total score: 22/30  Orientation-3/3  Recall(5 words)- immediate: 4/5 (not included in scoring); delayed:5/5  Problem solving:3/3  Divergent naming(concrete category): 2/3  Digit manipulation: 1/2  Visuospatial: 2/2  Clock drawin/4  Immediate recall (passage)-      PLAN    Duration/Frequency: Continue to follow patient 5x/week for duration of hospitalization and/or until goals met         MODIFIED LYNNETTE SCALE (mRS) SCORE: 1  Interpretation of Tool: The Modified Lynnette Scale is a scale used to quantify level of disability as it relates to a patient's functional abilities. No Symptoms(0); No significant disability despite symptoms; able to carry out all usual duties and activities(1); Slight disability; unable to carry out all previous activities but able to look after own affairs without assistance(2); Moderate disability; requiring some help but able to walk without assistance(3); Moderately severe disability; unable to walk without assistance and unable to attend to own bodily needs without assistance(4); Severe disability; bedridden, incontinent, and requiring constant nursing care and attention(5)      Education:     Role of ST   Follow up session     Current Medications:   No current facility-administered medications on file prior to encounter. Current Outpatient Medications on File Prior to Encounter   Medication Sig Dispense Refill    furosemide (LASIX) 20 MG tablet Take 1 tablet by mouth in the morning. 60 tablet 3    tamsulosin (FLOMAX) 0.4 MG capsule Take 1 capsule by mouth in the morning. 30 capsule 3    pantoprazole (PROTONIX) 40 MG tablet Take 1 tablet by mouth in the morning and 1 tablet in the evening. Take before meals. 30 tablet 3    amLODIPine (NORVASC) 5 MG tablet Take 1 tablet by mouth in the morning. 30 tablet 3    OLANZapine zydis (ZYPREXA) 5 MG disintegrating tablet Take 1 tablet by mouth nightly 30 tablet 3    QUEtiapine (SEROQUEL) 25 MG tablet Take 1 tablet by mouth in the morning.  60 tablet 3    rosuvastatin (CRESTOR) 10 MG tablet TAKE 1 TABLET BY MOUTH EVERY DAY 90 tablet 0    tiZANidine (ZANAFLEX) 2 MG tablet Take 1 tablet by mouth every 8 hours as needed (muscle spasms) 30 tablet 1    fluticasone-salmeterol (ADVAIR) 250-50 MCG/ACT AEPB diskus inhaler Inhale 1 puff into the lungs in the morning and at bedtime      calcitonin (MIACALCIN) 200 UNIT/ACT nasal spray 1 spray by Nasal route daily 1 each 1    albuterol sulfate  (90 Base) MCG/ACT inhaler USE 2 PUFFS BY INHALATION 4 TIMES DAILY AS NEEDED FOR WHEEZING OR SHORTNESS OF BREATH.      amiodarone (CORDARONE) 200 MG tablet Take 200 mg by mouth 2 times daily      apixaban (ELIQUIS) 5 MG TABS tablet Take 5 mg by mouth every 12 hours      ascorbic acid (VITAMIN C) 500 MG tablet Take 500 mg by mouth      carvedilol (COREG) 25 MG tablet Take 25 mg by mouth 2 times daily (with meals)      Cholecalciferol 50 MCG (2000 UT) TABS Take 2,000 Units by mouth      clopidogrel (PLAVIX) 75 MG tablet Take 75 mg by mouth daily      fluticasone (FLONASE) 50 MCG/ACT nasal spray USE 1 OR 2 SPRAYS IN EACH NOSTRIL EVERY DAY.       glipiZIDE (GLUCOTROL XL) 10 MG extended release tablet TAKE 1 TABLET BY MOUTH TWICE DAILY      glucosamine-chondroitin 750-600 MG TABS tablet Take by mouth 2 times daily      guaiFENesin 1200 MG TB12 Take 1,200 mg by mouth 2 times daily as needed      latanoprost (XALATAN) 0.005 % ophthalmic solution Apply 1 drop to eye      magnesium oxide (MAG-OX) 400 (240 Mg) MG tablet TAKE 1 TABLET BY MOUTH EVERY DAY      montelukast (SINGULAIR) 10 MG tablet TAKE 1 TABLET BY MOUTH EVERY DAY AT BEDTIME      nitroGLYCERIN (NITROSTAT) 0.4 MG SL tablet Place 0.4 mg under the tongue      polyethylene glycol (GLYCOLAX) 17 GM/SCOOP powder Take 17 g by mouth daily      SITagliptin (JANUVIA) 100 MG tablet TAKE 1 TABLET BY MOUTH EVERY DAY      valsartan (DIOVAN) 320 MG tablet Take 320 mg by mouth daily      [DISCONTINUED] metFORMIN (GLUCOPHAGE) 500 MG tablet TAKE TWO TABLETS BY MOUTH 2 TIMES A DAY       PRECAUTIONS/ALLERGIES: Azithromycin and Oxaprozin          Therapy Time  SLP Individual Minutes  Time In: 6252  Time Out: 9635  Minutes: 45     SLP Total Treatment Time  Total Treatment Time: [address] [place], [place]  8/29/2022 12:54 PM

## 2022-08-30 ENCOUNTER — APPOINTMENT (OUTPATIENT)
Dept: GENERAL RADIOLOGY | Age: 77
DRG: 094 | End: 2022-08-30
Attending: PHYSICAL MEDICINE & REHABILITATION
Payer: MEDICARE

## 2022-08-30 LAB
CREAT SERPL-MCNC: 1 MG/DL (ref 0.8–1.5)
GLUCOSE BLD STRIP.AUTO-MCNC: 140 MG/DL (ref 65–100)
GLUCOSE BLD STRIP.AUTO-MCNC: 169 MG/DL (ref 65–100)
GLUCOSE BLD STRIP.AUTO-MCNC: 179 MG/DL (ref 65–100)
GLUCOSE BLD STRIP.AUTO-MCNC: 244 MG/DL (ref 65–100)
SERVICE CMNT-IMP: ABNORMAL
VANCOMYCIN SERPL-MCNC: 15.7 UG/ML

## 2022-08-30 PROCEDURE — 1180000000 HC REHAB R&B

## 2022-08-30 PROCEDURE — 6360000002 HC RX W HCPCS: Performed by: PHYSICAL MEDICINE & REHABILITATION

## 2022-08-30 PROCEDURE — 94640 AIRWAY INHALATION TREATMENT: CPT

## 2022-08-30 PROCEDURE — 6370000000 HC RX 637 (ALT 250 FOR IP): Performed by: PHYSICAL MEDICINE & REHABILITATION

## 2022-08-30 PROCEDURE — 2580000003 HC RX 258: Performed by: PHYSICAL MEDICINE & REHABILITATION

## 2022-08-30 PROCEDURE — 82962 GLUCOSE BLOOD TEST: CPT

## 2022-08-30 PROCEDURE — 71046 X-RAY EXAM CHEST 2 VIEWS: CPT

## 2022-08-30 PROCEDURE — 97535 SELF CARE MNGMENT TRAINING: CPT

## 2022-08-30 PROCEDURE — 97116 GAIT TRAINING THERAPY: CPT

## 2022-08-30 PROCEDURE — 97110 THERAPEUTIC EXERCISES: CPT

## 2022-08-30 PROCEDURE — 94760 N-INVAS EAR/PLS OXIMETRY 1: CPT

## 2022-08-30 PROCEDURE — 82565 ASSAY OF CREATININE: CPT

## 2022-08-30 PROCEDURE — 97530 THERAPEUTIC ACTIVITIES: CPT

## 2022-08-30 PROCEDURE — 6370000000 HC RX 637 (ALT 250 FOR IP): Performed by: PHYSICIAN ASSISTANT

## 2022-08-30 PROCEDURE — 2700000000 HC OXYGEN THERAPY PER DAY

## 2022-08-30 PROCEDURE — 36415 COLL VENOUS BLD VENIPUNCTURE: CPT

## 2022-08-30 PROCEDURE — 80202 ASSAY OF VANCOMYCIN: CPT

## 2022-08-30 RX ORDER — FUROSEMIDE 40 MG/1
40 TABLET ORAL DAILY
Status: DISCONTINUED | OUTPATIENT
Start: 2022-08-31 | End: 2022-09-07 | Stop reason: HOSPADM

## 2022-08-30 RX ORDER — BUMETANIDE 1 MG/1
2 TABLET ORAL ONCE
Status: COMPLETED | OUTPATIENT
Start: 2022-08-30 | End: 2022-08-30

## 2022-08-30 RX ORDER — CARVEDILOL 6.25 MG/1
6.25 TABLET ORAL 2 TIMES DAILY WITH MEALS
Status: DISCONTINUED | OUTPATIENT
Start: 2022-08-30 | End: 2022-09-07 | Stop reason: HOSPADM

## 2022-08-30 RX ORDER — BUMETANIDE 1 MG/1
2 TABLET ORAL DAILY
Status: DISCONTINUED | OUTPATIENT
Start: 2022-08-30 | End: 2022-08-30

## 2022-08-30 RX ADMIN — CEFTRIAXONE 2000 MG: 2 INJECTION, POWDER, FOR SOLUTION INTRAMUSCULAR; INTRAVENOUS at 17:06

## 2022-08-30 RX ADMIN — Medication 6 MG: at 21:15

## 2022-08-30 RX ADMIN — SODIUM CHLORIDE, PRESERVATIVE FREE 10 ML: 5 INJECTION INTRAVENOUS at 08:47

## 2022-08-30 RX ADMIN — APIXABAN 5 MG: 5 TABLET, FILM COATED ORAL at 08:25

## 2022-08-30 RX ADMIN — GABAPENTIN 100 MG: 100 CAPSULE ORAL at 08:24

## 2022-08-30 RX ADMIN — ATORVASTATIN CALCIUM 20 MG: 20 TABLET, FILM COATED ORAL at 21:15

## 2022-08-30 RX ADMIN — POLYETHYLENE GLYCOL 3350 17 G: 17 POWDER, FOR SOLUTION ORAL at 08:24

## 2022-08-30 RX ADMIN — FLUTICASONE PROPIONATE 1 SPRAY: 50 SPRAY, METERED NASAL at 08:24

## 2022-08-30 RX ADMIN — CARVEDILOL 6.25 MG: 6.25 TABLET, FILM COATED ORAL at 17:08

## 2022-08-30 RX ADMIN — ACETAMINOPHEN 650 MG: 325 TABLET ORAL at 21:15

## 2022-08-30 RX ADMIN — ALBUTEROL SULFATE 2 PUFF: 90 AEROSOL, METERED RESPIRATORY (INHALATION) at 05:55

## 2022-08-30 RX ADMIN — CARVEDILOL 12.5 MG: 12.5 TABLET, FILM COATED ORAL at 08:44

## 2022-08-30 RX ADMIN — GABAPENTIN 100 MG: 100 CAPSULE ORAL at 13:03

## 2022-08-30 RX ADMIN — SODIUM CHLORIDE, PRESERVATIVE FREE 10 ML: 5 INJECTION INTRAVENOUS at 21:15

## 2022-08-30 RX ADMIN — ACETAMINOPHEN 650 MG: 325 TABLET ORAL at 08:24

## 2022-08-30 RX ADMIN — ALBUTEROL SULFATE 2 PUFF: 90 AEROSOL, METERED RESPIRATORY (INHALATION) at 16:13

## 2022-08-30 RX ADMIN — FUROSEMIDE 40 MG: 40 TABLET ORAL at 08:44

## 2022-08-30 RX ADMIN — AMIODARONE HYDROCHLORIDE 200 MG: 200 TABLET ORAL at 08:44

## 2022-08-30 RX ADMIN — LATANOPROST 1 DROP: 50 SOLUTION OPHTHALMIC at 21:22

## 2022-08-30 RX ADMIN — TRAZODONE HYDROCHLORIDE 25 MG: 50 TABLET ORAL at 21:15

## 2022-08-30 RX ADMIN — ANTI-FUNGAL POWDER MICONAZOLE NITRATE TALC FREE: 1.42 POWDER TOPICAL at 21:23

## 2022-08-30 RX ADMIN — ANTI-FUNGAL POWDER MICONAZOLE NITRATE TALC FREE: 1.42 POWDER TOPICAL at 08:47

## 2022-08-30 RX ADMIN — GUAIFENESIN 1200 MG: 600 TABLET ORAL at 21:15

## 2022-08-30 RX ADMIN — VANCOMYCIN HYDROCHLORIDE 750 MG: 750 INJECTION, POWDER, LYOPHILIZED, FOR SOLUTION INTRAVENOUS at 06:07

## 2022-08-30 RX ADMIN — OXYCODONE 5 MG: 5 TABLET ORAL at 09:06

## 2022-08-30 RX ADMIN — CLOPIDOGREL BISULFATE 75 MG: 75 TABLET ORAL at 08:25

## 2022-08-30 RX ADMIN — MOMETASONE FUROATE AND FORMOTEROL FUMARATE DIHYDRATE 2 PUFF: 100; 5 AEROSOL RESPIRATORY (INHALATION) at 05:55

## 2022-08-30 RX ADMIN — METFORMIN HYDROCHLORIDE 500 MG: 500 TABLET ORAL at 17:08

## 2022-08-30 RX ADMIN — CALCITONIN SALMON 1 SPRAY: 200 SPRAY, METERED NASAL at 12:32

## 2022-08-30 RX ADMIN — GABAPENTIN 100 MG: 100 CAPSULE ORAL at 21:15

## 2022-08-30 RX ADMIN — APIXABAN 5 MG: 5 TABLET, FILM COATED ORAL at 21:15

## 2022-08-30 RX ADMIN — NAPROXEN 500 MG: 250 TABLET ORAL at 17:08

## 2022-08-30 RX ADMIN — SENNOSIDES AND DOCUSATE SODIUM 2 TABLET: 8.6; 5 TABLET ORAL at 08:24

## 2022-08-30 RX ADMIN — INSULIN GLARGINE 14 UNITS: 100 INJECTION, SOLUTION SUBCUTANEOUS at 21:15

## 2022-08-30 RX ADMIN — GUAIFENESIN 1200 MG: 600 TABLET ORAL at 08:24

## 2022-08-30 RX ADMIN — VANCOMYCIN HYDROCHLORIDE 1250 MG: 100 INJECTION, POWDER, LYOPHILIZED, FOR SOLUTION INTRAVENOUS at 22:09

## 2022-08-30 RX ADMIN — MOMETASONE FUROATE AND FORMOTEROL FUMARATE DIHYDRATE 2 PUFF: 100; 5 AEROSOL RESPIRATORY (INHALATION) at 16:13

## 2022-08-30 RX ADMIN — ACETAMINOPHEN 650 MG: 325 TABLET ORAL at 13:03

## 2022-08-30 RX ADMIN — BUMETANIDE 2 MG: 1 TABLET ORAL at 12:31

## 2022-08-30 RX ADMIN — MONTELUKAST 10 MG: 10 TABLET, FILM COATED ORAL at 21:15

## 2022-08-30 RX ADMIN — PANTOPRAZOLE SODIUM 40 MG: 40 TABLET, DELAYED RELEASE ORAL at 06:09

## 2022-08-30 RX ADMIN — PANTOPRAZOLE SODIUM 40 MG: 40 TABLET, DELAYED RELEASE ORAL at 17:08

## 2022-08-30 RX ADMIN — BISACODYL 10 MG: 5 TABLET, COATED ORAL at 12:31

## 2022-08-30 ASSESSMENT — PAIN SCALES - GENERAL
PAINLEVEL_OUTOF10: 8
PAINLEVEL_OUTOF10: 0
PAINLEVEL_OUTOF10: 0

## 2022-08-30 ASSESSMENT — PAIN DESCRIPTION - DESCRIPTORS: DESCRIPTORS: ACHING

## 2022-08-30 ASSESSMENT — PAIN DESCRIPTION - LOCATION: LOCATION: BACK

## 2022-08-30 NOTE — PROGRESS NOTES
[address] Date: 8/26/2022  Admit Diagnosis:   Physical debility [R53.81]    Subjective     Summoned to room by staff and seen with Physiatrist during AM therapy; patient apparently had orthostatic hypotensive event, symptomatic with diaphoresis. Rebounded and was able to continue working with PT. Still with general LS pain and neuropathic pain but decreased by oxycodone IR and gabapentin. States he would feel better after BM; no rectal pressure, just mid-belly fullness. Slept well with trazodone, denies grogginess this AM. /55 with AM vitals.   this AM.    Objective:     Current Facility-Administered Medications   Medication Dose Route Frequency    vancomycin (VANCOCIN) 1250 mg in sodium chloride 0.9% 250 mL IVPB  1,250 mg IntraVENous Q18H    gabapentin (NEURONTIN) capsule 100 mg  100 mg Oral TID    oxyCODONE (ROXICODONE) immediate release tablet 5 mg  5 mg Oral Q4H PRN    ondansetron (ZOFRAN-ODT) disintegrating tablet 4 mg  4 mg Oral Q6H PRN    traZODone (DESYREL) tablet 25 mg  25 mg Oral Nightly    albuterol sulfate HFA (PROVENTIL;VENTOLIN;PROAIR) 108 (90 Base) MCG/ACT inhaler 2 puff  2 puff Inhalation BID    albuterol sulfate HFA (PROVENTIL;VENTOLIN;PROAIR) 108 (90 Base) MCG/ACT inhaler 2 puff  2 puff Inhalation Q6H PRN    carvedilol (COREG) tablet 12.5 mg  12.5 mg Oral BID WC    sodium chloride flush 0.9 % injection 10 mL  10 mL IntraVENous BID    miconazole (MICOTIN) 2 % powder   Topical BID    acetaminophen (TYLENOL) tablet 650 mg  650 mg Oral TID    amiodarone (CORDARONE) tablet 200 mg  200 mg Oral Daily    [Held by provider] amLODIPine (NORVASC) tablet 5 mg  5 mg Oral Daily    apixaban (ELIQUIS) tablet 5 mg  5 mg Oral BID    atorvastatin (LIPITOR) tablet 20 mg  20 mg Oral Nightly    bisacodyl (DULCOLAX) suppository 10 mg  10 mg Rectal Daily PRN    calcitonin (MIACALCIN) nasal spray 1 spray  1 spray Alternating Nares Daily clopidogrel (PLAVIX) tablet 75 mg  75 mg Oral Daily    fluticasone (FLONASE) 50 MCG/ACT nasal spray 1 spray  1 spray Each Nostril Daily    furosemide (LASIX) tablet 40 mg  40 mg Oral Daily    guaiFENesin (MUCINEX) extended release tablet 1,200 mg  1,200 mg Oral BID    insulin glargine (LANTUS) injection vial 14 Units  14 Units SubCUTAneous Nightly    insulin lispro (HUMALOG) injection vial 0-16 Units  0-16 Units SubCUTAneous TID WC    insulin lispro (HUMALOG) injection vial 0-4 Units  0-4 Units SubCUTAneous Nightly    latanoprost (XALATAN) 0.005 % ophthalmic solution 1 drop  1 drop Both Eyes Nightly    lidocaine 4 % external patch 1 patch  1 patch TransDERmal Daily    [Held by provider] losartan (COZAAR) tablet 100 mg  100 mg Oral Daily    melatonin tablet 6 mg  6 mg Oral Nightly    montelukast (SINGULAIR) tablet 10 mg  10 mg Oral Nightly    naproxen (NAPROSYN) tablet 500 mg  500 mg Oral BID WC    pantoprazole (PROTONIX) tablet 40 mg  40 mg Oral BID AC    polyethylene glycol (GLYCOLAX) packet 17 g  17 g Oral Daily    sennosides-docusate sodium (SENOKOT-S) 8.6-50 MG tablet 2 tablet  2 tablet Oral Daily PRN    glucose chewable tablet 16 g  4 tablet Oral PRN    dextrose bolus 10% 125 mL  125 mL IntraVENous PRN    Or    dextrose bolus 10% 250 mL  250 mL IntraVENous PRN    glucagon (rDNA) injection 1 mg  1 mg SubCUTAneous PRN    dextrose 10 % infusion   IntraVENous Continuous PRN    mometasone-formoterol (DULERA) 100-5 MCG/ACT inhaler 2 puff  2 puff Inhalation BID    cefTRIAXone (ROCEPHIN) 2,000 mg in sodium chloride 0.9 % 50 mL IVPB mini-bag  2,000 mg IntraVENous Q24H        Review of Systems:   Denies chest pain, shortness of breath, cough, headache, visual problems, abdominal pain, dysuria, calf pain. Pertinent positives are as noted in the HPI, ROS unremarkable otherwise.      Visit Vitals  BP (!) 114/55   Pulse 72   Temp 98.1 °F (36.7 °C) (Oral)   Resp 18   Ht 6' 1\" (1.854 m)   Wt 237 lb (107.5 kg)   SpO2 (!) 89% BMI 31.27 kg/m²        Physical Exam:   General: Alert, appropriately oriented. Back lying in bed, able to sit up with PT assist.   HEENT: Normocephalic, EOM intact. Oral mucosa moist.   Lungs: Coarse breath sounds bilaterally, no crackles / rhonchi. Respirations even and unlabored. Heart: Regular rate, regular underlying rhythm. No appreciable murmur but heart sounds muffled. Abdomen: Soft, non-tender, obese and protuberant but not distended. Bowel sounds normoactive, no organomegaly. Genitourinary: Deferred. Neuromuscular:      Speech clear, thoughts cogent. Formal MMT not repeated today but OLGUIN. Generalized weakness, no focal neuro deficits. Skin/extremity: No rashes, no erythema. Calves soft, non-tender B LE. Four unremarkable crosshatch marks at posterior trunk. Functional Assessment:  Per PT: max A sit<>stand, lateral pivot  Per OT: dependent for LB dressing, donning footwear, rode stationary bike x 10min    Riley Fall Risk Assessment:  History of Falling: Yes     Labs/Studies:  Recent Results (from the past 72 hour(s))   POCT Glucose    Collection Time: 08/27/22  4:18 PM   Result Value Ref Range    POC Glucose 158 (H) 65 - 100 mg/dL    Performed by: Sean    POCT Glucose    Collection Time: 08/27/22 10:04 PM   Result Value Ref Range    POC Glucose 151 (H) 65 - 100 mg/dL    Performed by:  Tamiko    Vancomycin Level, Random    Collection Time: 08/28/22 12:06 AM   Result Value Ref Range    Vancomycin Rm 20.2 UG/ML   CBC    Collection Time: 08/28/22  4:05 AM   Result Value Ref Range    WBC 6.5 4.3 - 11.1 K/uL    RBC 2.56 (L) 4.23 - 5.6 M/uL    Hemoglobin 7.9 (L) 13.6 - 17.2 g/dL    Hematocrit 24.7 (L) 41.1 - 50.3 %    MCV 96.5 79.6 - 97.8 FL    MCH 30.9 26.1 - 32.9 PG    MCHC 32.0 31.4 - 35.0 g/dL    RDW 16.7 (H) 11.9 - 14.6 %    Platelets 729 184 - 448 K/uL    MPV 10.5 9.4 - 12.3 FL    nRBC 0.00 0.0 - 0.2 K/uL   Comprehensive Metabolic Panel    Collection Time: 08/28/22  4:05 AM   Result Value Ref Range    Sodium 139 136 - 145 mmol/L    Potassium 3.7 3.5 - 5.1 mmol/L    Chloride 107 98 - 107 mmol/L    CO2 28 21 - 32 mmol/L    Anion Gap 4 (L) 7 - 16 mmol/L    Glucose 125 (H) 65 - 100 mg/dL    BUN 19 8 - 23 MG/DL    Creatinine 1.00 0.8 - 1.5 MG/DL    GFR African American >60 >60 ml/min/1.73m2    GFR Non- >60 >60 ml/min/1.73m2    Calcium 8.5 8.3 - 10.4 MG/DL    Total Bilirubin 0.5 0.2 - 1.1 MG/DL    ALT 54 12 - 65 U/L    AST 23 15 - 37 U/L    Alk Phosphatase 112 50 - 136 U/L    Total Protein 5.8 (L) 6.3 - 8.2 g/dL    Albumin 2.3 (L) 3.2 - 4.6 g/dL    Globulin 3.5 2.3 - 3.5 g/dL    Albumin/Globulin Ratio 0.7 (L) 1.2 - 3.5     POCT Glucose    Collection Time: 08/28/22  6:42 AM   Result Value Ref Range    POC Glucose 125 (H) 65 - 100 mg/dL    Performed by: Tamiko    POCT Glucose    Collection Time: 08/28/22 12:05 PM   Result Value Ref Range    POC Glucose 164 (H) 65 - 100 mg/dL    Performed by: Sean    POCT Glucose    Collection Time: 08/28/22  4:53 PM   Result Value Ref Range    POC Glucose 188 (H) 65 - 100 mg/dL    Performed by: Sean    POCT Glucose    Collection Time: 08/28/22  9:45 PM   Result Value Ref Range    POC Glucose 195 (H) 65 - 100 mg/dL    Performed by: Tamiko    CBC    Collection Time: 08/29/22  5:11 AM   Result Value Ref Range    WBC 6.2 4.3 - 11.1 K/uL    RBC 2.48 (L) 4.23 - 5.6 M/uL    Hemoglobin 7.8 (L) 13.6 - 17.2 g/dL    Hematocrit 24.2 (L) 41.1 - 50.3 %    MCV 97.6 79.6 - 97.8 FL    MCH 31.5 26.1 - 32.9 PG    MCHC 32.2 31.4 - 35.0 g/dL    RDW 16.8 (H) 11.9 - 14.6 %    Platelets 045 698 - 311 K/uL    MPV 10.5 9.4 - 12.3 FL    nRBC 0.00 0.0 - 0.2 K/uL   POCT Glucose    Collection Time: 08/29/22  6:21 AM   Result Value Ref Range    POC Glucose 138 (H) 65 - 100 mg/dL    Performed by:  Tamiko    POCT Glucose    Collection Time: 08/29/22 12:11 PM   Result Value Ref Range    POC Glucose 192 (H) 65 - 100 mg/dL    Performed by: Brice    POCT Glucose    Collection Time: 08/29/22  4:43 PM   Result Value Ref Range    POC Glucose 168 (H) 65 - 100 mg/dL    Performed by: Brice    POCT Glucose    Collection Time: 08/29/22  8:57 PM   Result Value Ref Range    POC Glucose 280 (H) 65 - 100 mg/dL    Performed by: Siperian    Vancomycin Level, Random    Collection Time: 08/30/22  4:18 AM   Result Value Ref Range    Vancomycin Rm 15.7 UG/ML   Creatinine    Collection Time: 08/30/22  4:18 AM   Result Value Ref Range    Creatinine 1.00 0.8 - 1.5 MG/DL   POCT Glucose    Collection Time: 08/30/22  6:08 AM   Result Value Ref Range    POC Glucose 140 (H) 65 - 100 mg/dL    Performed by: Siperian        Assessment:     Principal Problem:    Physical debility  Active Problems:    ICD (implantable cardioverter-defibrillator) in place    General weakness    Back pain    Anemia    Atrial fibrillation (HCC)    Complicated wound infection    HTN (hypertension)    Pulmonary emphysema ([facility])    COPD (chronic obstructive pulmonary disease) ([facility])    Cigarette nicotine dependence in remission    Diabetic neuropathy ([facility])    Chronic pain of right knee    Ischemic cardiomyopathy    PAD (peripheral artery disease) ([facility])    Hyperlipidemia    DM (diabetes mellitus) type II, controlled, with peripheral vascular disorder ([facility])    Obstructive sleep apnea    MINI (acute kidney injury) ([facility] [address]  Resolved Problems:    * No resolved hospital problems. 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 I have no doubt that pt can actively participate and tolerate as well as benefit from aggressive rehab. Hypo/HTN - BP fluctuating, managed medically.  Continue amlodipine 5mg daily, carvedilol 25mg BID, losartan 100mg daily and furosemide 40mg daily. -8/28 /68 this AM, yesterday asymptomatic LOW BP 93/58, 103/52; will hold amlodipine, losartan; continue to monitor, adjust as needed  -8/29 /73 before AM meds, recheck before lunch; consider adding back amlodipine or losartan at 1/2- or 1/4-strength if needed  -8/30 symptomatic OH this AM before meds; /55, decrease carvedilol to 6.25mg BID, continue holding amlodipine, losartan    Infectious disease - lumbar spine epidural abscess, psoas abscess, osteomyelitis of lower spine. Continue ceftriaxone through 9/9, already completed 6wks of ABX.  -8/28 WBC 6.5     Pain management - ongoing significant pain in back and paresthesic neuropathic pain requiring PRN pain meds. Will require regular pain assessment and comprehensive pain management. Had AMS with numerous pain medications post-operatively in acute and at [facility]. Will add gabapentin 100mg TID.  -8/28 continue scheduled APAP TID, naproxen BID, lidocaine patch  -8/29 unable to participate in ADLs earlier this AM due to pain; Physiatrist restarted oxycodone 5mg IR at 07h30, and by ~11h15, patient able to ambulate with PT      Electrolyte abnormality - hypokalemia, K+ 3.4 (8/25); replace and monitor.   -8/28 K+ 3.7    Hyperlipidemia - continue daily Lipitor 20mg. Cardiomyopathy, CAD, history of Vtach - s/p ICD; compensated. Continue Plavix 75mg daily. Atrial fibrillation - continue amiodarone 200mg daily, Eliquis 5mg BID. Anemia - Hgb 8.8 on 8/22. History of normocytic anemia going back ~8y in EMR. Monitor.  -8/28 Hgb 7.9, recheck CBC in AM  -8/29 Hgb 7.8, drifting downward; consider PO iron    COPD - continue Dulera, Singulair and Mucinex; well compensated. 8/30, coarse breath sounds, upper airway wheeze - CXR with early pulmonary edema. Add one-time dose bumetanide 2mg now and continue daily furosemide 40mg. Pneumonia prophylaxis - incentive spirometer every hour while awake.       DVT risk / DVT AM        Time spent was 25 minutes with over 1/2 in direct patient care / examination, consultation and coordination of care. Signed By: Bernadine Mckeon PA-C    August 30, 2022      Physician Assistant with [facility]  Jaky Love MD, Medical Director

## 2022-08-30 NOTE — PROGRESS NOTES
SPEECH PATHOLOGY NOTE:    Patient off the floor for an xray. Will f/u for further cognitive testing when patient is available.     Jordan Fink MS, CCC-SLP

## 2022-08-30 NOTE — PROGRESS NOTES
Physical Therapy  PHYSICAL THERAPY DAILY NOTE  Time In:  028 AM  Time Out:  1009 AM  Total Treatment Time:  (P) 96 Minutes  Pt. Seen for: AM, Gait Training, Therapeutic Exercise, and Transfer Training     Subjective: \"My low back just hurts. \"         Objective:  Precautions: Poor Safety Awareness and Impulsive     GROSS ASSESSMENT Daily Assessment           COGNITION Daily Assessment           BED/MAT MOBILITY Daily Assessment    Rolling Right: Max A  Rolling Left: Max A  Supine to Sit: Total A  Sit to Supine: Total A       TRANSFERS Daily Assessment    Sit to Stand: Max A  Stand to Sit: Max A  Transfer Type: Lateral Pivot  Transfer Assistance: Max A  Car Transfers: NT         GAIT Daily Assessment    Amount of Assistance:   Distance (ft):   Assistive Device:   Surface:        STEPS/STAIRS Daily Assessment    Steps Ambulated:    Level of Assistance:    Railing:  Assistive Device:        BALANCE Daily Assessment    Static Sitting:   Dynamic Sitting:   Static Standing:   Dynamic Standing:        WHEELCHAIR MOBILITY Daily Assessment    Able to Propel (ft):   Assistance:   Surface:   Wheelchair Set-up:        LOWER EXTREMITY EXERCISES Daily Assessment    Rode motomed x 10 minutes. SEATED EXERCISES Sets Reps Comments   Ankle Pumps 2 10    Hip Flexion 2 10    Long Arc Quads 2 10    Hip Adduction/Ball Squeeze 2 10                             Pain level: reported by patient a 9 on pain scale  Pain Location:  low back  Pain Interventions: Nurse Jeanine giving pain meds and applying patch on low back at the start of treatment. Vital Signs:  BP (!) 114/55   Pulse 72   Temp 98.1 °F (36.7 °C) (Oral)   Resp 18   Ht 6' 1\" (1.854 m)   Wt 237 lb (107.5 kg)   SpO2 (!) 89%   BMI 31.27 kg/m²       Education:      Interdisciplinary Communication:     Pt. Left in bed supine awaiting transport for x ray. Assessment: Patient seemed to be having less pain at the end of treatment.          Plan of Care: Continue with plan of care.     Isabelle Nagel, PTA  8/30/2022

## 2022-08-30 NOTE — PROGRESS NOTES
08/29/22 4721   NIV Type   $NIV $Daily Charge   Skin Assessment Clean, dry, & intact   Skin Protection for O2 Device No   NIV Started/Stopped On   Equipment Type Home CPAP   Mode Auto-PAP   Mask Type Nasal pillows   Mask Size Medium   Settings/Measurements   CPAP/EPAP   (Autoset)   O2 Flow Rate (L/min) 2 L/min   FiO2  28 %   Mask Leak (lpm)   (Mask fits well.)   Comfort Level Good   Using Accessory Muscles No   SpO2 96   Patient's Home Machine Yes (type/vendor)   Electrical Safety Check Performed Yes   Breath Sounds   Right Upper Lobe Diminished   Right Middle Lobe Diminished   Right Lower Lobe Diminished   Left Upper Lobe Diminished   Left Lower Lobe Diminished

## 2022-08-30 NOTE — PROGRESS NOTES
Physical Therapy  PHYSICAL THERAPY DAILY NOTE  Time In:  1276 PM  Time Out:  1441 PM  Total Treatment Time:  (P) 52 Minutes  Pt. Seen for: PM, Gait Training, Therapeutic Exercise, and Transfer Training     Subjective: \" My knee is just not working today. \"         Objective:  Precautions: Poor Safety Awareness and Impulsive     GROSS ASSESSMENT Daily Assessment           COGNITION Daily Assessment           BED/MAT MOBILITY Daily Assessment    Rolling Right:   Rolling Left:   Supine to Sit: NT  Sit to Supine: NT       TRANSFERS Daily Assessment    Sit to Stand: Max A  Stand to Sit: Max A  Transfer Type: Lateral Pivot  Transfer Assistance: Total A  Car Transfers: NT         GAIT Daily Assessment   Patient stood 3 times but right knee kept buckling. Amount of Assistance: Total A  Distance (ft):   Assistive Device: RW and Gait Belt  Surface: Level Surface       STEPS/STAIRS Daily Assessment    Steps Ambulated:    Level of Assistance:    Railing:  Assistive Device:        BALANCE Daily Assessment    Static Sitting:   Dynamic Sitting:   Static Standing:   Dynamic Standing:        WHEELCHAIR MOBILITY Daily Assessment    Able to Propel (ft): 120  Assistance: Supervision/Standby Assist  Surface: Level Surface  Wheelchair Set-up: NT       LOWER EXTREMITY EXERCISES Daily Assessment         Pain level: 4 on pain scale  Pain Location: low back  Pain Interventions:     Vital Signs:  BP (!) 114/54   Pulse 65   Temp 97.3 °F (36.3 °C)   Resp 18   Ht 6' 1\" (1.854 m)   Wt 237 lb (107.5 kg)   SpO2 92%   BMI 31.27 kg/m²       Education:      Interdisciplinary Communication:     Pt. Left in recliner with call bell at reach. Wife complained about patient being very hall today and not himself. \" It must be new medicine. \"       Assessment: Patient varies on how much assistance he requires for mobility and transfers. Discussed with wife concerning the 2 small steps to get into home.  \" With his 'bad' days she may require a ramp to mobilize him in and out of the home. Plan of Care: Continue with plan of care.     Jeffery Guzman, PTA  8/30/2022

## 2022-08-30 NOTE — PROGRESS NOTES
OT DAILY NOTE    Time In: 656  Time Out: 739     Score Comments   Supine to Sit Partial/moderate assistance A for trunk   Sit to Supine Dependent Ax2   Sit to Stand Dependent Ax2   Transfer Assist Dependent Ax2     Bathing Current Functional Level   Score Substantial/maximal assistance   Comments A for all of LB and A for sitting balance for UB     Upper Body   Dressing Current Functional Level   Score Dependent   Comments A for all parts     Lower Body   Dressing Current Functional Level     Functional Level Dependent   Comments A for all parts     [place]  Footwear Current Functional Level     Functional Level Dependent   Comments A for all parts     Summary of Session: Pt was in bed and agreeable to tx with max encouragement. Pt's performance with ADL is reflected in above chart. Pt was not able to follow cues or figure out how to call his wife. He said his clothes were probably in the kitchen. Pt had decreased motor planning requiring increased assistance. Dr. Lexii Bellamy notified.      Rufino Schmidt, OT  8/30/2022

## 2022-08-30 NOTE — PROGRESS NOTES
VANCO DAILY FOLLOW UP NOTE  [facility] Pharmacokinetic Monitoring Service - Vancomycin    Consulting Provider: Dr. Basilio Guthrie  Indication: L2-L4 osteodiscitis and psoas abscess  Target Concentration: Goal AUC/GIOVANNY 400-600 mg*hr/L  Duration of Therapy: EOT 9/9/22  Additional Antimicrobials: ceftriaxone    Pertinent Laboratory Values: Wt Readings from Last 1 Encounters:   08/26/22 237 lb (107.5 kg)     Temp Readings from Last 1 Encounters:   08/29/22 98.4 °F (36.9 °C) (Oral)     Recent Labs     08/28/22  0405 08/29/22  0511 08/30/22  0418   BUN 19  --   --    CREATININE 1.00  --  1.00   WBC 6.5 6.2  --      Estimated Creatinine Clearance: 78 mL/min (based on SCr of 1 mg/dL). Lab Results   Component Value Date/Time    VANCOTROUGH 18.2 08/26/2022 07:30 AM    VANCORANDOM 15.7 08/30/2022 04:18 AM     MRSA Nasal Swab: N/A. Non-respiratory infection.     Assessment:  Date/Time Dose Concentration AUC   8/26 0730 1000 mg q12h Tr = 18.2 [facility]   8/28 0006  1000 mg q12h 20.2 570   8/30 0418 750 mg q12h 15.7 402   Note: Serum concentrations collected for AUC dosing may appear elevated if collected in close proximity to the dose administered, this is not necessarily an indication of toxicity    Plan:  Current dosing regimen is low end of therapeutic  Increase dose to 1250 mg q18h for predicted AUC/Tr of 446/13.3  Repeat vancomycin concentrations will be ordered as clinically appropriate   Pharmacy will continue to monitor patient and adjust therapy as indicated    Thank you for the consult,  Marcia Ceron, [facility]

## 2022-08-31 LAB
GLUCOSE BLD STRIP.AUTO-MCNC: 105 MG/DL (ref 65–100)
GLUCOSE BLD STRIP.AUTO-MCNC: 136 MG/DL (ref 65–100)
GLUCOSE BLD STRIP.AUTO-MCNC: 186 MG/DL (ref 65–100)
GLUCOSE BLD STRIP.AUTO-MCNC: 282 MG/DL (ref 65–100)
SERVICE CMNT-IMP: ABNORMAL

## 2022-08-31 PROCEDURE — 2700000000 HC OXYGEN THERAPY PER DAY

## 2022-08-31 PROCEDURE — 1180000000 HC REHAB R&B

## 2022-08-31 PROCEDURE — 94640 AIRWAY INHALATION TREATMENT: CPT

## 2022-08-31 PROCEDURE — 6370000000 HC RX 637 (ALT 250 FOR IP): Performed by: PHYSICAL MEDICINE & REHABILITATION

## 2022-08-31 PROCEDURE — 6360000002 HC RX W HCPCS: Performed by: PHYSICAL MEDICINE & REHABILITATION

## 2022-08-31 PROCEDURE — 94760 N-INVAS EAR/PLS OXIMETRY 1: CPT

## 2022-08-31 PROCEDURE — 97530 THERAPEUTIC ACTIVITIES: CPT

## 2022-08-31 PROCEDURE — 2580000003 HC RX 258: Performed by: PHYSICAL MEDICINE & REHABILITATION

## 2022-08-31 PROCEDURE — 99232 SBSQ HOSP IP/OBS MODERATE 35: CPT | Performed by: PHYSICIAN ASSISTANT

## 2022-08-31 PROCEDURE — 98960 EDU&TRN PT SELF-MGMT NQHP 1: CPT

## 2022-08-31 PROCEDURE — 97110 THERAPEUTIC EXERCISES: CPT

## 2022-08-31 PROCEDURE — 82962 GLUCOSE BLOOD TEST: CPT

## 2022-08-31 PROCEDURE — 92507 TX SP LANG VOICE COMM INDIV: CPT

## 2022-08-31 PROCEDURE — 94660 CPAP INITIATION&MGMT: CPT

## 2022-08-31 PROCEDURE — 94664 DEMO&/EVAL PT USE INHALER: CPT

## 2022-08-31 PROCEDURE — 97116 GAIT TRAINING THERAPY: CPT

## 2022-08-31 PROCEDURE — 6370000000 HC RX 637 (ALT 250 FOR IP): Performed by: PHYSICIAN ASSISTANT

## 2022-08-31 RX ORDER — GLIPIZIDE 5 MG/1
2.5 TABLET ORAL
Status: DISCONTINUED | OUTPATIENT
Start: 2022-09-01 | End: 2022-09-02

## 2022-08-31 RX ORDER — GABAPENTIN 100 MG/1
200 CAPSULE ORAL 3 TIMES DAILY
Status: DISCONTINUED | OUTPATIENT
Start: 2022-08-31 | End: 2022-09-03

## 2022-08-31 RX ADMIN — ALBUTEROL SULFATE 2 PUFF: 90 AEROSOL, METERED RESPIRATORY (INHALATION) at 21:56

## 2022-08-31 RX ADMIN — CARVEDILOL 6.25 MG: 6.25 TABLET, FILM COATED ORAL at 17:18

## 2022-08-31 RX ADMIN — AMIODARONE HYDROCHLORIDE 200 MG: 200 TABLET ORAL at 09:50

## 2022-08-31 RX ADMIN — MOMETASONE FUROATE AND FORMOTEROL FUMARATE DIHYDRATE 2 PUFF: 100; 5 AEROSOL RESPIRATORY (INHALATION) at 15:08

## 2022-08-31 RX ADMIN — TRAZODONE HYDROCHLORIDE 25 MG: 50 TABLET ORAL at 22:20

## 2022-08-31 RX ADMIN — LATANOPROST 1 DROP: 50 SOLUTION OPHTHALMIC at 22:19

## 2022-08-31 RX ADMIN — ATORVASTATIN CALCIUM 20 MG: 20 TABLET, FILM COATED ORAL at 22:19

## 2022-08-31 RX ADMIN — Medication 6 MG: at 22:19

## 2022-08-31 RX ADMIN — GABAPENTIN 200 MG: 100 CAPSULE ORAL at 22:19

## 2022-08-31 RX ADMIN — POLYETHYLENE GLYCOL 3350 17 G: 17 POWDER, FOR SOLUTION ORAL at 09:49

## 2022-08-31 RX ADMIN — MONTELUKAST 10 MG: 10 TABLET, FILM COATED ORAL at 22:19

## 2022-08-31 RX ADMIN — ACETAMINOPHEN 650 MG: 325 TABLET ORAL at 22:19

## 2022-08-31 RX ADMIN — ANTI-FUNGAL POWDER MICONAZOLE NITRATE TALC FREE: 1.42 POWDER TOPICAL at 09:52

## 2022-08-31 RX ADMIN — ALBUTEROL SULFATE 2 PUFF: 90 AEROSOL, METERED RESPIRATORY (INHALATION) at 15:08

## 2022-08-31 RX ADMIN — SODIUM CHLORIDE, PRESERVATIVE FREE 10 ML: 5 INJECTION INTRAVENOUS at 09:52

## 2022-08-31 RX ADMIN — CEFTRIAXONE 2000 MG: 2 INJECTION, POWDER, FOR SOLUTION INTRAMUSCULAR; INTRAVENOUS at 16:01

## 2022-08-31 RX ADMIN — SODIUM CHLORIDE, PRESERVATIVE FREE 10 ML: 5 INJECTION INTRAVENOUS at 22:20

## 2022-08-31 RX ADMIN — PANTOPRAZOLE SODIUM 40 MG: 40 TABLET, DELAYED RELEASE ORAL at 06:02

## 2022-08-31 RX ADMIN — METFORMIN HYDROCHLORIDE 500 MG: 500 TABLET ORAL at 09:50

## 2022-08-31 RX ADMIN — PANTOPRAZOLE SODIUM 40 MG: 40 TABLET, DELAYED RELEASE ORAL at 17:19

## 2022-08-31 RX ADMIN — ACETAMINOPHEN 650 MG: 325 TABLET ORAL at 14:37

## 2022-08-31 RX ADMIN — MAGNESIUM HYDROXIDE 30 ML: 2400 SUSPENSION ORAL at 17:19

## 2022-08-31 RX ADMIN — INSULIN GLARGINE 14 UNITS: 100 INJECTION, SOLUTION SUBCUTANEOUS at 21:30

## 2022-08-31 RX ADMIN — ACETAMINOPHEN 650 MG: 325 TABLET ORAL at 09:50

## 2022-08-31 RX ADMIN — GABAPENTIN 100 MG: 100 CAPSULE ORAL at 09:50

## 2022-08-31 RX ADMIN — CALCITONIN SALMON 1 SPRAY: 200 SPRAY, METERED NASAL at 10:01

## 2022-08-31 RX ADMIN — ALBUTEROL SULFATE 2 PUFF: 90 AEROSOL, METERED RESPIRATORY (INHALATION) at 05:09

## 2022-08-31 RX ADMIN — CLOPIDOGREL BISULFATE 75 MG: 75 TABLET ORAL at 09:49

## 2022-08-31 RX ADMIN — FLUTICASONE PROPIONATE 1 SPRAY: 50 SPRAY, METERED NASAL at 09:52

## 2022-08-31 RX ADMIN — VANCOMYCIN HYDROCHLORIDE 1250 MG: 100 INJECTION, POWDER, LYOPHILIZED, FOR SOLUTION INTRAVENOUS at 16:42

## 2022-08-31 RX ADMIN — GABAPENTIN 100 MG: 100 CAPSULE ORAL at 14:38

## 2022-08-31 RX ADMIN — GUAIFENESIN 1200 MG: 600 TABLET ORAL at 09:49

## 2022-08-31 RX ADMIN — APIXABAN 5 MG: 5 TABLET, FILM COATED ORAL at 09:50

## 2022-08-31 RX ADMIN — MOMETASONE FUROATE AND FORMOTEROL FUMARATE DIHYDRATE 2 PUFF: 100; 5 AEROSOL RESPIRATORY (INHALATION) at 05:09

## 2022-08-31 RX ADMIN — GUAIFENESIN 1200 MG: 600 TABLET ORAL at 22:19

## 2022-08-31 RX ADMIN — ANTI-FUNGAL POWDER MICONAZOLE NITRATE TALC FREE: 1.42 POWDER TOPICAL at 22:24

## 2022-08-31 RX ADMIN — CARVEDILOL 6.25 MG: 6.25 TABLET, FILM COATED ORAL at 09:50

## 2022-08-31 RX ADMIN — NAPROXEN 500 MG: 250 TABLET ORAL at 17:19

## 2022-08-31 RX ADMIN — NAPROXEN 500 MG: 250 TABLET ORAL at 09:49

## 2022-08-31 RX ADMIN — FUROSEMIDE 40 MG: 40 TABLET ORAL at 09:50

## 2022-08-31 RX ADMIN — APIXABAN 5 MG: 5 TABLET, FILM COATED ORAL at 22:20

## 2022-08-31 RX ADMIN — METFORMIN HYDROCHLORIDE 500 MG: 500 TABLET ORAL at 17:19

## 2022-08-31 RX ADMIN — INSULIN LISPRO 8 UNITS: 100 INJECTION, SOLUTION INTRAVENOUS; SUBCUTANEOUS at 12:01

## 2022-08-31 ASSESSMENT — PAIN DESCRIPTION - LOCATION
LOCATION: HIP

## 2022-08-31 ASSESSMENT — PAIN DESCRIPTION - ORIENTATION
ORIENTATION: RIGHT;LEFT
ORIENTATION: LEFT;RIGHT
ORIENTATION: RIGHT;LEFT

## 2022-08-31 ASSESSMENT — PAIN SCALES - GENERAL
PAINLEVEL_OUTOF10: 3
PAINLEVEL_OUTOF10: 0
PAINLEVEL_OUTOF10: 3
PAINLEVEL_OUTOF10: 4

## 2022-08-31 ASSESSMENT — PAIN DESCRIPTION - DESCRIPTORS
DESCRIPTORS: ACHING

## 2022-08-31 NOTE — PROGRESS NOTES
Physical Therapy  PHYSICAL THERAPY DAILY NOTE  Time In:  1945 PM  Time Out:  0096 PM  Total Treatment Time:  27 Minutes  Pt. Seen for: PM, Therapeutic Exercise, and Transfer Training     Subjective: \" Sometimes my legs work and sometimes they dont. \"         Objective:  Precautions: Poor Safety Awareness    GROSS ASSESSMENT Daily Assessment           COGNITION Daily Assessment           BED/MAT MOBILITY Daily Assessment    Rolling Right: NT  Rolling Left: NT  Supine to Sit: Mod A  Sit to Supine: Mod A       TRANSFERS Daily Assessment    Sit to Stand: Max A  Stand to Sit: Max A  Transfer Type: Stand Pivot  Transfer Assistance: Max A  Car Transfers: NT         GAIT Daily Assessment   Worked on sit to stand from w/c Amount of Assistance: Mod A  Distance (ft):   Assistive Device: RW and Gait Belt  Surface: Level Surface       STEPS/STAIRS Daily Assessment    Steps Ambulated:    Level of Assistance:    Railing:  Assistive Device:        BALANCE Daily Assessment    Static Sitting:   Dynamic Sitting:   Static Standing:   Dynamic Standing:        WHEELCHAIR MOBILITY Daily Assessment    Able to Propel (ft):   Assistance:   Surface:   Wheelchair Set-up:        LOWER EXTREMITY EXERCISES Daily Assessment    SEATED EXERCISES Sets Reps Comments   Ankle Pumps 3 10    Hip Flexion 3 10    Long Arc Quads 3 10    Hip Adduction/Ball Squeeze 3 10                             Pain level: no pain noted  Pain Location:    Pain Interventions:     Vital Signs:  BP (!) 145/69   Pulse 70   Temp 98.2 °F (36.8 °C) (Oral)   Resp 16   Ht 6' 1\" (1.854 m)   Wt 237 lb (107.5 kg)   SpO2 96%   BMI 31.27 kg/m²       Education:      Interdisciplinary Communication:     Pt. Left in recliner with call bell at reach. Assessment: Patient making some progress but will need w/c for home for safety in the home. Plan of Care: Continue with plan of care.     Bry Cote, PTA  8/31/2022

## 2022-08-31 NOTE — PROGRESS NOTES
GOALS:   STG: Patient will complete functional verbal/visual reasoning activities with 80% accuracy with minimal assistance  STG: Patient will complete calculations with 80% accuracy with minimal assistance. STG: Patient will complete functional attention tasks with 80% accuracy with minimal assistance. STG: Patient will complete basic mental manipulation tasks with 80% accuracy with minimal assistance. STG: Patient will completed divergent naming tasks with 80% accuracy with minimal assistance. LTG: Patient will increase neuro-linguistic abilities to increase safety and awareness of deficits. SPEECH LANGUAGE PATHOLOGY: COMMUNICATION Daily Note #1    MRN: [id number]    ADMISSION DATE: 8/26/2022  PRIMARY DIAGNOSIS: Physical debility  Physical debility [R53.81]    ICD-10: Treatment Diagnosis: R41.841 Cognitive-Communication Deficit    RECOMMENDATIONS:   Recommendations: Follow up cognitive assessment     Patient continues to require skilled intervention:     Cognitive treatment     ASSESSMENT    Follow up cognitive treatment initiated with patient more alert and engaged. No c/o pain. However, significant difficulty with completion of familiar tasks for basic money management. Confusion upon arrival back to his room stating Scot Colder you sure this is my room? \"  Patient's wife had denied cognitive issues other than initial encephalopathy on admission. Patient stating that he forgets that names of people he should know at home and has asked his wife in the past if she thought he might have dementia. Basic time telling at 9/10 with patient able to correct 1 error with a verbal cues. Attempted counting money and making basic change with patient requiring MaxA. Decreased attention, impaired immediate recall of instructions, and decreased problem solving to determine calculations demonstrated. Patient and wife report degree of difficulty with the task is a departure from baseline.   Suspect some cognitive deficits prior to admission now exacerbated with prolonged hospitalizaiton. Recommend ST to address cognitive function for improved independence and safety at discharge. GENERAL   Sitting up in the wheelchair with wife present. Pain:        No current complaints of pain          OBJECTIVE    SLUMS and other measures completed to evaluate cognitive linguistic functioning: Total score: 22/30  Orientation-3/3  Recall(5 words)- immediate: 5 (not included in scoring); delayed:  Problem solving:3/3  Divergent naming(concrete category): 2/3  Digit manipulation: 1/2  Visuospatial: 2/2  Clock drawin/4  Immediate recall (passage)-      PLAN    Duration/Frequency: Continue to follow patient 5x/week for duration of hospitalization and/or until goals met         MODIFIED JACK SCALE (mRS) SCORE: 1  Interpretation of Tool: The Modified [place] Scale is a scale used to quantify level of disability as it relates to a patient's functional abilities. No Symptoms(0); No significant disability despite symptoms; able to carry out all usual duties and activities(1); Slight disability; unable to carry out all previous activities but able to look after own affairs without assistance(2); Moderate disability; requiring some help but able to walk without assistance(3); Moderately severe disability; unable to walk without assistance and unable to attend to own bodily needs without assistance(4); Severe disability; bedridden, incontinent, and requiring constant nursing care and attention(5)      Education:     Role of ST   Follow up session     Current Medications:   No current facility-administered medications on file prior to encounter. Current Outpatient Medications on File Prior to Encounter   Medication Sig Dispense Refill    furosemide (LASIX) 20 MG tablet Take 1 tablet by mouth in the morning. 60 tablet 3    tamsulosin (FLOMAX) 0.4 MG capsule Take 1 capsule by mouth in the morning.  30 capsule 3 pantoprazole (PROTONIX) 40 MG tablet Take 1 tablet by mouth in the morning and 1 tablet in the evening. Take before meals. 30 tablet 3    amLODIPine (NORVASC) 5 MG tablet Take 1 tablet by mouth in the morning. 30 tablet 3    OLANZapine zydis (ZYPREXA) 5 MG disintegrating tablet Take 1 tablet by mouth nightly 30 tablet 3    QUEtiapine (SEROQUEL) 25 MG tablet Take 1 tablet by mouth in the morning. 60 tablet 3    rosuvastatin (CRESTOR) 10 MG tablet TAKE 1 TABLET BY MOUTH EVERY DAY 90 tablet 0    tiZANidine (ZANAFLEX) 2 MG tablet Take 1 tablet by mouth every 8 hours as needed (muscle spasms) 30 tablet 1    fluticasone-salmeterol (ADVAIR) 250-50 MCG/ACT AEPB diskus inhaler Inhale 1 puff into the lungs in the morning and at bedtime      calcitonin (MIACALCIN) 200 UNIT/ACT nasal spray 1 spray by Nasal route daily 1 each 1    albuterol sulfate  (90 Base) MCG/ACT inhaler USE 2 PUFFS BY INHALATION 4 TIMES DAILY AS NEEDED FOR WHEEZING OR SHORTNESS OF BREATH.      amiodarone (CORDARONE) 200 MG tablet Take 200 mg by mouth 2 times daily      apixaban (ELIQUIS) 5 MG TABS tablet Take 5 mg by mouth every 12 hours      ascorbic acid (VITAMIN C) 500 MG tablet Take 500 mg by mouth      carvedilol (COREG) 25 MG tablet Take 25 mg by mouth 2 times daily (with meals)      Cholecalciferol 50 MCG (2000 UT) TABS Take 2,000 Units by mouth      clopidogrel (PLAVIX) 75 MG tablet Take 75 mg by mouth daily      fluticasone (FLONASE) 50 MCG/ACT nasal spray USE 1 OR 2 SPRAYS IN EACH NOSTRIL EVERY DAY.       glipiZIDE (GLUCOTROL XL) 10 MG extended release tablet TAKE 1 TABLET BY MOUTH TWICE DAILY      glucosamine-chondroitin 750-600 MG TABS tablet Take by mouth 2 times daily      guaiFENesin 1200 MG TB12 Take 1,200 mg by mouth 2 times daily as needed      latanoprost (XALATAN) 0.005 % ophthalmic solution Apply 1 drop to eye      magnesium oxide (MAG-OX) 400 (240 Mg) MG tablet TAKE 1 TABLET BY MOUTH EVERY DAY      montelukast (SINGULAIR) 10 MG tablet TAKE 1 TABLET BY MOUTH EVERY DAY AT BEDTIME      nitroGLYCERIN (NITROSTAT) 0.4 MG SL tablet Place 0.4 mg under the tongue      polyethylene glycol (GLYCOLAX) 17 GM/SCOOP powder Take 17 g by mouth daily      SITagliptin (JANUVIA) 100 MG tablet TAKE 1 TABLET BY MOUTH EVERY DAY      valsartan (DIOVAN) 320 MG tablet Take 320 mg by mouth daily      [DISCONTINUED] metFORMIN (GLUCOPHAGE) 500 MG tablet TAKE TWO TABLETS BY MOUTH 2 TIMES A DAY       PRECAUTIONS/ALLERGIES: Azithromycin and Oxaprozin          Therapy Time  SLP Individual Minutes  Time In: 7072  Time Out: [facility]  Minutes: 35     SLP Total Treatment Time  Total Treatment Time: [address]  MICHAEL Faust  8/31/2022 1:17 PM

## 2022-08-31 NOTE — PROGRESS NOTES
Physical Therapy  PHYSICAL THERAPY DAILY NOTE  Time In:  1016 AM  Time Out:  1043 AM  Total Treatment Time:  (P) 27 Minutes  Pt. Seen for: AM, Gait Training, and Transfer Training     Subjective: \" I just dont want to get up. \"          Objective: Wife present and assisted rose marie/doff shoes. Precautions: Poor Safety Awareness    GROSS ASSESSMENT Daily Assessment           COGNITION Daily Assessment           BED/MAT MOBILITY Daily Assessment    Rolling Right: Mod A  Rolling Left: Mod A  Supine to Sit: Max A  Sit to Supine: Max A       TRANSFERS Daily Assessment    Sit to Stand: Max A  Stand to Sit: Max A  Transfer Type: Stand Pivot and with rolling walker  Transfer Assistance: Max A  Car Transfers: NT         GAIT Daily Assessment   Leans forward and complains of his knees. Amount of Assistance: Max A  Distance (ft): 20  Assistive Device: RW and Gait Belt  Surface: Level Surface       STEPS/STAIRS Daily Assessment    Steps Ambulated:    Level of Assistance:  Railing:  Assistive Device:        BALANCE Daily Assessment    Static Sitting:   Dynamic Sitting:   Static Standing:   Dynamic Standing:        WHEELCHAIR MOBILITY Daily Assessment    Able to Propel (ft):   Assistance:   Surface:   Wheelchair Set-up:        LOWER EXTREMITY EXERCISES Daily Assessment         Pain level: pain not rated. Pain Location:  low back  Pain Interventions:     Vital Signs:  BP (!) 145/69   Pulse 63   Temp 98.2 °F (36.8 °C) (Oral)   Resp 18   Ht 6' 1\" (1.854 m)   Wt 237 lb (107.5 kg)   SpO2 95%   BMI 31.27 kg/m²       Education:      Interdisciplinary Communication:     Pt. Left in wheelchair for next therapy. Assessment: Patient will require assistance at home. Plan of Care: Continue with plan of care.     Jinny Beasley, PTA  8/31/2022

## 2022-08-31 NOTE — PROGRESS NOTES
[address]  Admit Date: 8/26/2022  Admit Diagnosis:   Physical debility [R53.81]    Subjective     Patient seen and examined after last therapy, OOB in recliner finishing phone conversation with wife. No OH event today like yesterday; patient states he slept well with trazodone. Reports several brief \"flashes\" of neuropathic pain as previously experienced, amenable to increasing gabapentin dose. No BM after bisacodyl tabs; still no rectal pressure, just mid-belly fullness.  /69 before meds,  this AM.    Objective:     Current Facility-Administered Medications   Medication Dose Route Frequency    gabapentin (NEURONTIN) capsule 200 mg  200 mg Oral TID    vancomycin (VANCOCIN) 1250 mg in sodium chloride 0.9% 250 mL IVPB  1,250 mg IntraVENous Q18H    carvedilol (COREG) tablet 6.25 mg  6.25 mg Oral BID WC    furosemide (LASIX) tablet 40 mg  40 mg Oral Daily    metFORMIN (GLUCOPHAGE) tablet 500 mg  500 mg Oral BID WC    oxyCODONE (ROXICODONE) immediate release tablet 5 mg  5 mg Oral Q4H PRN    ondansetron (ZOFRAN-ODT) disintegrating tablet 4 mg  4 mg Oral Q6H PRN    traZODone (DESYREL) tablet 25 mg  25 mg Oral Nightly    albuterol sulfate HFA (PROVENTIL;VENTOLIN;PROAIR) 108 (90 Base) MCG/ACT inhaler 2 puff  2 puff Inhalation BID    albuterol sulfate HFA (PROVENTIL;VENTOLIN;PROAIR) 108 (90 Base) MCG/ACT inhaler 2 puff  2 puff Inhalation Q6H PRN    sodium chloride flush 0.9 % injection 10 mL  10 mL IntraVENous BID    miconazole (MICOTIN) 2 % powder   Topical BID    acetaminophen (TYLENOL) tablet 650 mg  650 mg Oral TID    amiodarone (CORDARONE) tablet 200 mg  200 mg Oral Daily    [Held by provider] amLODIPine (NORVASC) tablet 5 mg  5 mg Oral Daily    apixaban (ELIQUIS) tablet 5 mg  5 mg Oral BID    atorvastatin (LIPITOR) tablet 20 mg  20 mg Oral Nightly    bisacodyl (DULCOLAX) suppository 10 mg  10 mg Rectal Daily PRN    calcitonin (MIACALCIN) nasal spray 1 spray  1 spray Alternating Nares Daily    clopidogrel (PLAVIX) tablet 75 mg  75 mg Oral Daily    fluticasone (FLONASE) 50 MCG/ACT nasal spray 1 spray  1 spray Each Nostril Daily    guaiFENesin (MUCINEX) extended release tablet 1,200 mg  1,200 mg Oral BID    insulin glargine (LANTUS) injection vial 14 Units  14 Units SubCUTAneous Nightly    insulin lispro (HUMALOG) injection vial 0-16 Units  0-16 Units SubCUTAneous TID WC    insulin lispro (HUMALOG) injection vial 0-4 Units  0-4 Units SubCUTAneous Nightly    latanoprost (XALATAN) 0.005 % ophthalmic solution 1 drop  1 drop Both Eyes Nightly    lidocaine 4 % external patch 1 patch  1 patch TransDERmal Daily    [Held by provider] losartan (COZAAR) tablet 100 mg  100 mg Oral Daily    melatonin tablet 6 mg  6 mg Oral Nightly    montelukast (SINGULAIR) tablet 10 mg  10 mg Oral Nightly    naproxen (NAPROSYN) tablet 500 mg  500 mg Oral BID WC    pantoprazole (PROTONIX) tablet 40 mg  40 mg Oral BID AC    polyethylene glycol (GLYCOLAX) packet 17 g  17 g Oral Daily    sennosides-docusate sodium (SENOKOT-S) 8.6-50 MG tablet 2 tablet  2 tablet Oral Daily PRN    glucose chewable tablet 16 g  4 tablet Oral PRN    dextrose bolus 10% 125 mL  125 mL IntraVENous PRN    Or    dextrose bolus 10% 250 mL  250 mL IntraVENous PRN    glucagon (rDNA) injection 1 mg  1 mg SubCUTAneous PRN    dextrose 10 % infusion   IntraVENous Continuous PRN    mometasone-formoterol (DULERA) 100-5 MCG/ACT inhaler 2 puff  2 puff Inhalation BID    cefTRIAXone (ROCEPHIN) 2,000 mg in sodium chloride 0.9 % 50 mL IVPB mini-bag  2,000 mg IntraVENous Q24H        Review of Systems:   Denies chest pain, shortness of breath, cough, headache, visual problems, dysuria, calf pain. Pertinent positives are as noted in the HPI, ROS unremarkable otherwise.      Visit Vitals  /66   Pulse 63   Temp 98.4 °F (36.9 °C) (Oral)   Resp 18   Ht 6' 1\" (1.854 m)   Wt 237 lb (107.5 kg)   SpO2 93%   BMI 31.27 kg/m²        Physical Exam:   General: Alert, appropriately oriented. OOB in recliner after therapies with B LE elevated. HEENT: Normocephalic, EOM intact. Oral mucosa moist.   Lungs: Clearer breath sounds bilaterally, no crackles / rhonchi. Respirations even and unlabored. Heart: Regular rate, regular underlying rhythm. No appreciable murmur but heart sounds muffled. Abdomen: Soft on left, firmer centrally and on right, non-tender; obese and protuberant but not distended. Bowel sounds normoactive, no organomegaly. Genitourinary: Deferred. Neuromuscular:      Speech clear, thoughts cogent. Formal MMT not repeated today but OLGUIN. Generalized weakness, no focal neuro deficits. Skin/extremity: No rashes, no erythema. Calves soft, non-tender B LE. Trace pedal edema bilaterally. Functional Assessment:  Per PT: max A for sit<>stand, stand-pivot transfer; worked on sit<>stand from wheelchair, required mod A  Per OT: completed 5 minutes on the ergometer frontwards and backwards with light resistance to increase UB strength and activity tolerance for integration into functional mobility. Rosemary Fall Risk Assessment:  History of Falling: Yes     Swallow Assessment:  Swallow Screening  Is the patient unable to remain alert for testing?: No  Is the patient on a modified diet (thickened liquids) due to pre-existing dysphagia?: No  Is there presence of existing enteral tube feeding via the stomach or nose?: No  Is there presence of head-of-bed restrictions (less than 30 degrees)?: No  Is there presence of tracheotomy tube?: No  Is the patient ordered nothing-by-mouth status?: No  3 oz Water Swallow Screen: Pass      Ambulation:  Non-ambulatory. Labs/Studies:  Recent Results (from the past 72 hour(s))   POCT Glucose    Collection Time: 08/28/22  9:45 PM   Result Value Ref Range    POC Glucose 195 (H) 65 - 100 mg/dL    Performed by:  Tamiko    CBC    Collection Time: 08/29/22  5:11 AM   Result Value Ref Range WBC 6.2 4.3 - 11.1 K/uL    RBC 2.48 (L) 4.23 - 5.6 M/uL    Hemoglobin 7.8 (L) 13.6 - 17.2 g/dL    Hematocrit 24.2 (L) 41.1 - 50.3 %    MCV 97.6 79.6 - 97.8 FL    MCH 31.5 26.1 - 32.9 PG    MCHC 32.2 31.4 - 35.0 g/dL    RDW 16.8 (H) 11.9 - 14.6 %    Platelets 909 207 - 886 K/uL    MPV 10.5 9.4 - 12.3 FL    nRBC 0.00 0.0 - 0.2 K/uL   POCT Glucose    Collection Time: 08/29/22  6:21 AM   Result Value Ref Range    POC Glucose 138 (H) 65 - 100 mg/dL    Performed by: Tamiko    POCT Glucose    Collection Time: 08/29/22 12:11 PM   Result Value Ref Range    POC Glucose 192 (H) 65 - 100 mg/dL    Performed by: Brice    POCT Glucose    Collection Time: 08/29/22  4:43 PM   Result Value Ref Range    POC Glucose 168 (H) 65 - 100 mg/dL    Performed by: Brice    POCT Glucose    Collection Time: 08/29/22  8:57 PM   Result Value Ref Range    POC Glucose 280 (H) 65 - 100 mg/dL    Performed by: [id number]    Vancomycin Level, Random    Collection Time: 08/30/22  4:18 AM   Result Value Ref Range    Vancomycin Rm 15.7 UG/ML   Creatinine    Collection Time: 08/30/22  4:18 AM   Result Value Ref Range    Creatinine 1.00 0.8 - 1.5 MG/DL   POCT Glucose    Collection Time: 08/30/22  6:08 AM   Result Value Ref Range    POC Glucose 140 (H) 65 - 100 mg/dL    Performed by: [id number]    POCT Glucose    Collection Time: 08/30/22 11:44 AM   Result Value Ref Range    POC Glucose 169 (H) 65 - 100 mg/dL    Performed by:  Brady    POCT Glucose    Collection Time: 08/30/22  4:29 PM   Result Value Ref Range    POC Glucose 179 (H) 65 - 100 mg/dL    Performed by: Susan    POCT Glucose    Collection Time: 08/30/22  8:54 PM   Result Value Ref Range    POC Glucose 244 (H) 65 - 100 mg/dL    Performed by: [id number]    POCT Glucose    Collection Time: 08/31/22  6:02 AM   Result Value Ref Range    POC Glucose 136 (H) 65 - 100 mg/dL    Performed by: [id number]    POCT Glucose Collection Time: 08/31/22 12:00 PM   Result Value Ref Range    POC Glucose 282 (H) 65 - 100 mg/dL    Performed by: Lanny    POCT Glucose    Collection Time: 08/31/22  4:11 PM   Result Value Ref Range    POC Glucose 105 (H) 65 - 100 mg/dL    Performed by: Lanny        Assessment:     Principal Problem:    Physical debility  Active Problems:    ICD (implantable cardioverter-defibrillator) in place    General weakness    Back pain    Anemia    Atrial fibrillation (HCC)    Complicated wound infection    HTN (hypertension)    Pulmonary emphysema (HCC)    COPD (chronic obstructive pulmonary disease) (HCC)    Cigarette nicotine dependence in remission    Diabetic neuropathy (HCC)    Chronic pain of right knee    Ischemic cardiomyopathy    PAD (peripheral artery disease) ([facility])    Hyperlipidemia    DM (diabetes mellitus) type II, controlled, with peripheral vascular disorder ([facility])    Obstructive sleep apnea    MINI (acute kidney injury) ([facility] [address]  Resolved Problems:    * No resolved hospital problems. 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 I have no doubt that pt can actively participate and tolerate as well as benefit from aggressive rehab. Hypo/HTN - BP fluctuating, managed medically. Continue amlodipine 5mg daily, carvedilol 25mg BID, losartan 100mg daily and furosemide 40mg daily.   -8/28 /68 this AM, yesterday asymptomatic LOW BP 93/58, 103/52; will hold amlodipine, losartan; continue to monitor, adjust as needed  -8/29 /73 before AM meds, recheck before lunch; consider adding back amlodipine or losartan at 1/2- or 1/4-strength if needed  -8/30 symptomatic OH this AM before meds; /55, decrease carvedilol to 6.25mg BID, continue holding amlodipine, losartan  -8/31 /69 before AM meds, 118/66 after     Infectious disease - lumbar spine epidural abscess, psoas abscess, osteomyelitis of lower spine. Continue ceftriaxone and vancomycin through 9/9, already completed 6wks of ABX.  -8/28 WBC 6.5     Pain management - ongoing significant pain in back and paresthesic neuropathic pain requiring PRN pain meds. Will require regular pain assessment and comprehensive pain management. Had AMS with numerous pain medications post-operatively in acute and at [facility]. Will add gabapentin 100mg TID.  -8/28 continue scheduled APAP TID, naproxen BID, lidocaine patch  -8/29 unable to participate in ADLs earlier this AM due to pain; Physiatrist restarted oxycodone 5mg IR at 07h30, and by ~11h15, patient able to ambulate with PT   -8/31 increase gabapentin to 200mg TID; patient with initial good response to gabapentin     Electrolyte abnormality - hypokalemia, K+ 3.4 (8/25); replace and monitor.   -8/28 K+ 3.7     Hyperlipidemia - continue daily Lipitor 20mg. Cardiomyopathy, CAD, history of Vtach - s/p ICD; compensated. Continue Plavix 75mg daily. Atrial fibrillation - continue amiodarone 200mg daily, Eliquis 5mg BID. Anemia - Hgb 8.8 on 8/22. History of normocytic anemia going back ~8y in EMR. Monitor.  -8/28 Hgb 7.9, recheck CBC in AM  -8/29 Hgb 7.8, drifting downward; consider PO iron     COPD - continue Dulera, Singulair and Mucinex; well compensated. 8/30, coarse breath sounds, upper airway wheeze - CXR with early pulmonary edema. Add one-time dose bumetanide 2mg now and continue daily furosemide 40mg. Pneumonia prophylaxis - incentive spirometer every hour while awake. DVT risk / DVT prophylaxis - daily physician / PA exam to assess as patient is at increased risk for of thromboembolism. Mobilize as tolerated. Sequential pneumatic compression devices (SCDs) when in bed; thigh-high or knee-high thromboembolic deterrent hose when out of bed. On Eliquis.      Diabetes mellitus - HgbA1c 6.0% (8/10/2022), was 5.5% in May prior to kyphoplasty. Continue Lantus 14u at bedtime, Humalog SSI. Adjust as needed. Per EMR, prior to admission he was on metformin 500mg BID, Januvia 100mg daily, glipizide XL 10mg daily. -8/28  this AM, yesterday 127, 209, 158, 151  -8/29  this AM; ranged 125-195 yesterday  -8/30  this AM, ranged 138-280 yesterday; resume home dose metformin 500mg BID (Cr / BUN normal 8/28)  -8/31  this AM, ranged 140-244 yesterday; resume glipizide (not XL) tomorrow but at 2.5mg strength     GI prophylaxis - resume PPI. At times may need additional antacids, Maalox prn. History of tobacco use - stress abstinence, education. Reportedly quit 15y ago but continues with chewing tobacco.     General skin care / wound prevention - monitor general skin wound status daily per staff and physician / PA. At risk for failure due to impaired mobility. Bladder program / urinary retention / neurogenic bladder - schedule voids q6-8h. Check post-void residual as needed; in-and-out catheter if post-void residual is more than 400ml. Bowel program - at risk for constipation as a side effect of opioids, other medications, impaired mobility, etc. MiraLAX daily for regularity, Joanna-Colace for stool softener. PRN MOM, bisacodyl suppository or tablets for constipation. -8/30 c/o constipation, normally uses Fleets enema at home but declines that here; no rectal pressure, just mid-belly fullness; bisacodyl 10mg PO now  -8/31 no BM with bisacodyl PO, pharmacy does not have mag citrate; MOM 30ml now     Sleep disruption, 8/29 - no relief from scheduled Vistaril 25mg at bedtime. Does not require medications for sleep at home. Start low-dose trazodone cautiously, as patient had AMS in [place] attributed to polypharmacy.   -8/30 slept well on trazodone, denies hangover effect this AM  -8/31 sleeping well, continue trazodone          Time spent was 25 minutes with over 1/2 in direct patient care/examination, consultation and coordination of care. Signed By: Jose Del Real PA-C    August 31, 2022      Physician Assistant with [facility]  Jaky Ahn MD, Medical Director

## 2022-08-31 NOTE — PROGRESS NOTES
OT Daily Note  Time In 1307   Time Out 1347     Pain: Pt has no complaints of pain. Functional Mobility   Pt was dependent for w/c mobility. Cognition   Pt engaged in blink card game working on attention, visual perceptual skills, problem solving, and STM skills. Pt was to match cards in hand to two center cards based on similarities in either color, shape, or number. Pt required demonstration for initially starting game. Pt required moderate verbal cueing throughout game. Activity Tolerance   Pt completed 5 minutes on the ergometer frontwards and backwards with light resistance to increase UB strength and activity tolerance for integration into functional mobility. Education   Discharge date and home services     Plan: Continue with POC. Pt was left with PTA [facility].      Emeterio Hubbard OTR/L  8/31/2022

## 2022-08-31 NOTE — PROGRESS NOTES
OT Daily Note  Time In 1117   Time Out 12     Pain: Pt has no complaints of pain. Cognition   Pt worked on simple direction following task with written directions with 50% accuracy. Instructions were read to him with appropriate pauses with 100% accuracy. Pt had poor insight into deficits with activity. Pt demonstrates significant problem solving, memory, and error recognition errors making it unsafe for him to return home. Education   Purpose of activities     Interdisciplinary Communication: Team conference    Plan: Continue with POC. Pt was left in room with all needs within reach.      Emeterio Hubbard OTR/L  8/31/2022

## 2022-08-31 NOTE — PROGRESS NOTES
08/31/22 0000   NIV Type   $NIV $Daily Charge   Skin Assessment Clean, dry, & intact   Skin Protection for O2 Device No   NIV Started/Stopped On   Equipment Type Home CPAP   Mode Auto-PAP   Mask Type Nasal pillows   Mask Size Large   Settings/Measurements   CPAP/EPAP 12 cmH2O  (AutoSet)   Resp 18   O2 Flow Rate (L/min) 2 L/min  (Bleed in for CPAP)   FiO2  28 %   Mask Leak (lpm)   (Mask fits well.)   Comfort Level Good   Using Accessory Muscles No   SpO2 96   Patient's Home Machine Yes (type/vendor)   Electrical Safety Check Performed Yes   Breath Sounds   Right Upper Lobe Diminished   Right Middle Lobe Diminished   Right Lower Lobe Diminished   Left Upper Lobe Diminished   Left Lower Lobe Diminished

## 2022-08-31 NOTE — CARE COORDINATION
Team conference today with discharge set for Saturday, 9/10. BSN, CM met with pt at bedside and updated on d/c date. Will follow up closer to discharge for [place] and DME needs. Discussed with pt need for ramp at home. Pt declined as he reports 4 steps are small 4-5 inches with platform. Will request picture from spouse. Will contact spouse and update as time allows. CM to continue to follow and monitor for any further needs.

## 2022-08-31 NOTE — PROGRESS NOTES
VANCO DAILY FOLLOW UP NOTE  [address] [facility] Pharmacokinetic Monitoring Service - Vancomycin    Consulting Provider: Dr. Emilee Vargas  Indication: L2-L4 osteodiscitis and psoas abscess  Target Concentration: Goal AUC/GIOVANNY 400-600 mg*hr/L  Duration of Therapy: EOT 9/9/22  Additional Antimicrobials: ceftriaxone    Pertinent Laboratory Values: Wt Readings from Last 1 Encounters:   08/26/22 237 lb (107.5 kg)     Temp Readings from Last 1 Encounters:   08/31/22 98.2 °F (36.8 °C) (Oral)     Recent Labs     08/29/22  0511 08/30/22  0418   CREATININE  --  1.00   WBC 6.2  --      Estimated Creatinine Clearance: 78 mL/min (based on SCr of 1 mg/dL). Lab Results   Component Value Date/Time    VANCOTROUGH 18.2 08/26/2022 07:30 AM    VANCORANDOM 15.7 08/30/2022 04:18 AM     MRSA Nasal Swab: N/A. Non-respiratory infection. Assessment:  Date/Time Dose Concentration AUC   8/26 0730 1000 mg q12h Tr = 18.2 [facility]   8/28 0006  1000 mg q12h 20.2 570   8/30 0418 750 mg q12h 15.7 402   Note: Serum concentrations collected for AUC dosing may appear elevated if collected in close proximity to the dose administered, this is not necessarily an indication of toxicity    Plan:  Current dosing regimen is therapeutic   Continue current dose of 1250 mg q18h (projected AUC/Tr of 444/13. 2)  Repeat vancomycin concentrations ordered for 9/1 @0400  Pharmacy will continue to monitor patient and adjust therapy as indicated    Thank you for the consult,  Edna Comer [facility]

## 2022-09-01 LAB
GLUCOSE BLD STRIP.AUTO-MCNC: 109 MG/DL (ref 65–100)
GLUCOSE BLD STRIP.AUTO-MCNC: 140 MG/DL (ref 65–100)
GLUCOSE BLD STRIP.AUTO-MCNC: 164 MG/DL (ref 65–100)
GLUCOSE BLD STRIP.AUTO-MCNC: 187 MG/DL (ref 65–100)
SERVICE CMNT-IMP: ABNORMAL
VANCOMYCIN SERPL-MCNC: 16.2 UG/ML

## 2022-09-01 PROCEDURE — 82962 GLUCOSE BLOOD TEST: CPT

## 2022-09-01 PROCEDURE — 97530 THERAPEUTIC ACTIVITIES: CPT

## 2022-09-01 PROCEDURE — 1180000000 HC REHAB R&B

## 2022-09-01 PROCEDURE — 2700000000 HC OXYGEN THERAPY PER DAY

## 2022-09-01 PROCEDURE — 97110 THERAPEUTIC EXERCISES: CPT

## 2022-09-01 PROCEDURE — 6370000000 HC RX 637 (ALT 250 FOR IP): Performed by: PHYSICIAN ASSISTANT

## 2022-09-01 PROCEDURE — 2580000003 HC RX 258: Performed by: PHYSICAL MEDICINE & REHABILITATION

## 2022-09-01 PROCEDURE — 99232 SBSQ HOSP IP/OBS MODERATE 35: CPT | Performed by: PHYSICAL MEDICINE & REHABILITATION

## 2022-09-01 PROCEDURE — 94760 N-INVAS EAR/PLS OXIMETRY 1: CPT

## 2022-09-01 PROCEDURE — 80202 ASSAY OF VANCOMYCIN: CPT

## 2022-09-01 PROCEDURE — 6370000000 HC RX 637 (ALT 250 FOR IP): Performed by: PHYSICAL MEDICINE & REHABILITATION

## 2022-09-01 PROCEDURE — 6360000002 HC RX W HCPCS: Performed by: PHYSICAL MEDICINE & REHABILITATION

## 2022-09-01 PROCEDURE — 97116 GAIT TRAINING THERAPY: CPT

## 2022-09-01 PROCEDURE — 92507 TX SP LANG VOICE COMM INDIV: CPT

## 2022-09-01 PROCEDURE — 36415 COLL VENOUS BLD VENIPUNCTURE: CPT

## 2022-09-01 PROCEDURE — 94640 AIRWAY INHALATION TREATMENT: CPT

## 2022-09-01 RX ORDER — SENNA AND DOCUSATE SODIUM 50; 8.6 MG/1; MG/1
2 TABLET, FILM COATED ORAL DAILY
Status: DISCONTINUED | OUTPATIENT
Start: 2022-09-02 | End: 2022-09-07 | Stop reason: HOSPADM

## 2022-09-01 RX ADMIN — METFORMIN HYDROCHLORIDE 500 MG: 500 TABLET ORAL at 08:59

## 2022-09-01 RX ADMIN — MOMETASONE FUROATE AND FORMOTEROL FUMARATE DIHYDRATE 2 PUFF: 100; 5 AEROSOL RESPIRATORY (INHALATION) at 06:09

## 2022-09-01 RX ADMIN — AMIODARONE HYDROCHLORIDE 200 MG: 200 TABLET ORAL at 09:01

## 2022-09-01 RX ADMIN — INSULIN GLARGINE 14 UNITS: 100 INJECTION, SOLUTION SUBCUTANEOUS at 20:53

## 2022-09-01 RX ADMIN — MONTELUKAST 10 MG: 10 TABLET, FILM COATED ORAL at 21:12

## 2022-09-01 RX ADMIN — APIXABAN 5 MG: 5 TABLET, FILM COATED ORAL at 21:12

## 2022-09-01 RX ADMIN — FLUTICASONE PROPIONATE 1 SPRAY: 50 SPRAY, METERED NASAL at 09:15

## 2022-09-01 RX ADMIN — CARVEDILOL 6.25 MG: 6.25 TABLET, FILM COATED ORAL at 08:59

## 2022-09-01 RX ADMIN — ATORVASTATIN CALCIUM 20 MG: 20 TABLET, FILM COATED ORAL at 21:12

## 2022-09-01 RX ADMIN — ACETAMINOPHEN 650 MG: 325 TABLET ORAL at 14:24

## 2022-09-01 RX ADMIN — ACETAMINOPHEN 650 MG: 325 TABLET ORAL at 21:12

## 2022-09-01 RX ADMIN — APIXABAN 5 MG: 5 TABLET, FILM COATED ORAL at 09:09

## 2022-09-01 RX ADMIN — GLIPIZIDE 2.5 MG: 5 TABLET ORAL at 08:59

## 2022-09-01 RX ADMIN — SODIUM CHLORIDE, PRESERVATIVE FREE 10 ML: 5 INJECTION INTRAVENOUS at 21:19

## 2022-09-01 RX ADMIN — CALCITONIN SALMON 1 SPRAY: 200 SPRAY, METERED NASAL at 09:11

## 2022-09-01 RX ADMIN — GABAPENTIN 200 MG: 100 CAPSULE ORAL at 21:12

## 2022-09-01 RX ADMIN — GABAPENTIN 200 MG: 100 CAPSULE ORAL at 09:01

## 2022-09-01 RX ADMIN — FUROSEMIDE 40 MG: 40 TABLET ORAL at 08:59

## 2022-09-01 RX ADMIN — VANCOMYCIN HYDROCHLORIDE 1250 MG: 100 INJECTION, POWDER, LYOPHILIZED, FOR SOLUTION INTRAVENOUS at 11:03

## 2022-09-01 RX ADMIN — CARVEDILOL 6.25 MG: 6.25 TABLET, FILM COATED ORAL at 16:38

## 2022-09-01 RX ADMIN — GUAIFENESIN 1200 MG: 600 TABLET ORAL at 21:12

## 2022-09-01 RX ADMIN — GABAPENTIN 200 MG: 100 CAPSULE ORAL at 14:24

## 2022-09-01 RX ADMIN — LATANOPROST 1 DROP: 50 SOLUTION OPHTHALMIC at 21:11

## 2022-09-01 RX ADMIN — SODIUM CHLORIDE, PRESERVATIVE FREE 10 ML: 5 INJECTION INTRAVENOUS at 09:11

## 2022-09-01 RX ADMIN — PANTOPRAZOLE SODIUM 40 MG: 40 TABLET, DELAYED RELEASE ORAL at 16:40

## 2022-09-01 RX ADMIN — NAPROXEN 500 MG: 250 TABLET ORAL at 09:01

## 2022-09-01 RX ADMIN — Medication 6 MG: at 21:13

## 2022-09-01 RX ADMIN — PANTOPRAZOLE SODIUM 40 MG: 40 TABLET, DELAYED RELEASE ORAL at 06:29

## 2022-09-01 RX ADMIN — CLOPIDOGREL BISULFATE 75 MG: 75 TABLET ORAL at 09:01

## 2022-09-01 RX ADMIN — NAPROXEN 500 MG: 250 TABLET ORAL at 16:40

## 2022-09-01 RX ADMIN — GUAIFENESIN 1200 MG: 600 TABLET ORAL at 09:00

## 2022-09-01 RX ADMIN — TRAZODONE HYDROCHLORIDE 25 MG: 50 TABLET ORAL at 21:12

## 2022-09-01 RX ADMIN — CEFTRIAXONE 2000 MG: 2 INJECTION, POWDER, FOR SOLUTION INTRAMUSCULAR; INTRAVENOUS at 16:29

## 2022-09-01 RX ADMIN — METFORMIN HYDROCHLORIDE 500 MG: 500 TABLET ORAL at 16:40

## 2022-09-01 RX ADMIN — ACETAMINOPHEN 650 MG: 325 TABLET ORAL at 09:00

## 2022-09-01 RX ADMIN — MOMETASONE FUROATE AND FORMOTEROL FUMARATE DIHYDRATE 2 PUFF: 100; 5 AEROSOL RESPIRATORY (INHALATION) at 16:48

## 2022-09-01 RX ADMIN — ANTI-FUNGAL POWDER MICONAZOLE NITRATE TALC FREE: 1.42 POWDER TOPICAL at 21:19

## 2022-09-01 RX ADMIN — ALBUTEROL SULFATE 2 PUFF: 90 AEROSOL, METERED RESPIRATORY (INHALATION) at 16:48

## 2022-09-01 RX ADMIN — ALBUTEROL SULFATE 2 PUFF: 90 AEROSOL, METERED RESPIRATORY (INHALATION) at 06:09

## 2022-09-01 ASSESSMENT — PAIN DESCRIPTION - DESCRIPTORS
DESCRIPTORS: ACHING
DESCRIPTORS: ACHING

## 2022-09-01 ASSESSMENT — PAIN SCALES - GENERAL
PAINLEVEL_OUTOF10: 3
PAINLEVEL_OUTOF10: 3
PAINLEVEL_OUTOF10: 0
PAINLEVEL_OUTOF10: 5

## 2022-09-01 ASSESSMENT — PAIN DESCRIPTION - LOCATION
LOCATION: BACK
LOCATION: BACK

## 2022-09-01 ASSESSMENT — PAIN DESCRIPTION - ORIENTATION
ORIENTATION: LOWER
ORIENTATION: LOWER

## 2022-09-01 NOTE — PROGRESS NOTES
[address]  Admit Date: 8/26/2022  Admit Diagnosis:   Physical debility [R53.81]    Subjective     Patient seen and examined before AM therapies, sitting up in bed watching TV. Reminded him he has oxycodone IR for LS pain and has used only 2 doses since 8/29; he admits to aversion to narcotics, concerned about addiction. Denies CP, palpitations, dyspnea. Large BM x 2 after MOM.  /55,  this AM.     Objective:     Current Facility-Administered Medications   Medication Dose Route Frequency    gabapentin (NEURONTIN) capsule 200 mg  200 mg Oral TID    glipiZIDE (GLUCOTROL) tablet 2.5 mg  2.5 mg Oral QAM AC    vancomycin (VANCOCIN) 1250 mg in sodium chloride 0.9% 250 mL IVPB  1,250 mg IntraVENous Q18H    carvedilol (COREG) tablet 6.25 mg  6.25 mg Oral BID WC    furosemide (LASIX) tablet 40 mg  40 mg Oral Daily    metFORMIN (GLUCOPHAGE) tablet 500 mg  500 mg Oral BID WC    oxyCODONE (ROXICODONE) immediate release tablet 5 mg  5 mg Oral Q4H PRN    ondansetron (ZOFRAN-ODT) disintegrating tablet 4 mg  4 mg Oral Q6H PRN    traZODone (DESYREL) tablet 25 mg  25 mg Oral Nightly    albuterol sulfate HFA (PROVENTIL;VENTOLIN;PROAIR) 108 (90 Base) MCG/ACT inhaler 2 puff  2 puff Inhalation BID    albuterol sulfate HFA (PROVENTIL;VENTOLIN;PROAIR) 108 (90 Base) MCG/ACT inhaler 2 puff  2 puff Inhalation Q6H PRN    sodium chloride flush 0.9 % injection 10 mL  10 mL IntraVENous BID    miconazole (MICOTIN) 2 % powder   Topical BID    acetaminophen (TYLENOL) tablet 650 mg  650 mg Oral TID    amiodarone (CORDARONE) tablet 200 mg  200 mg Oral Daily    [Held by provider] amLODIPine (NORVASC) tablet 5 mg  5 mg Oral Daily    apixaban (ELIQUIS) tablet 5 mg  5 mg Oral BID    atorvastatin (LIPITOR) tablet 20 mg  20 mg Oral Nightly    bisacodyl (DULCOLAX) suppository 10 mg  10 mg Rectal Daily PRN    calcitonin (MIACALCIN) nasal spray 1 spray  1 spray Alternating Nares Daily clopidogrel (PLAVIX) tablet 75 mg  75 mg Oral Daily    fluticasone (FLONASE) 50 MCG/ACT nasal spray 1 spray  1 spray Each Nostril Daily    guaiFENesin (MUCINEX) extended release tablet 1,200 mg  1,200 mg Oral BID    insulin glargine (LANTUS) injection vial 14 Units  14 Units SubCUTAneous Nightly    insulin lispro (HUMALOG) injection vial 0-16 Units  0-16 Units SubCUTAneous TID WC    insulin lispro (HUMALOG) injection vial 0-4 Units  0-4 Units SubCUTAneous Nightly    latanoprost (XALATAN) 0.005 % ophthalmic solution 1 drop  1 drop Both Eyes Nightly    lidocaine 4 % external patch 1 patch  1 patch TransDERmal Daily    [Held by provider] losartan (COZAAR) tablet 100 mg  100 mg Oral Daily    melatonin tablet 6 mg  6 mg Oral Nightly    montelukast (SINGULAIR) tablet 10 mg  10 mg Oral Nightly    naproxen (NAPROSYN) tablet 500 mg  500 mg Oral BID WC    pantoprazole (PROTONIX) tablet 40 mg  40 mg Oral BID AC    polyethylene glycol (GLYCOLAX) packet 17 g  17 g Oral Daily    sennosides-docusate sodium (SENOKOT-S) 8.6-50 MG tablet 2 tablet  2 tablet Oral Daily PRN    glucose chewable tablet 16 g  4 tablet Oral PRN    dextrose bolus 10% 125 mL  125 mL IntraVENous PRN    Or    dextrose bolus 10% 250 mL  250 mL IntraVENous PRN    glucagon (rDNA) injection 1 mg  1 mg SubCUTAneous PRN    dextrose 10 % infusion   IntraVENous Continuous PRN    mometasone-formoterol (DULERA) 100-5 MCG/ACT inhaler 2 puff  2 puff Inhalation BID    cefTRIAXone (ROCEPHIN) 2,000 mg in sodium chloride 0.9 % 50 mL IVPB mini-bag  2,000 mg IntraVENous Q24H        Review of Systems:   Denies cough, headache, visual problems, abdominal pain, dysuria, calf pain. Pertinent positives are as noted in the HPI, ROS unremarkable otherwise. Visit Vitals  BP (!) 115/55   Pulse 65   Temp 98.2 °F (36.8 °C)   Resp 20   Ht 6' 1\" (1.854 m)   Wt 237 lb (107.5 kg)   SpO2 (!) 32%   BMI 31.27 kg/m²        Physical Exam:   General: Alert, appropriately oriented.   Sitting up in bed before therapy. HEENT: Normocephalic, EOM intact. Oral mucosa moist.   Lungs: Clear breath sounds bilaterally. Respirations even and unlabored. Heart: Regular rate, mostly regular underlying rhythm. No appreciable murmur but heart sounds muffled. Abdomen: Soft, non-tender, obese and protuberant but not distended. Bowel sounds normoactive, no organomegaly. Genitourinary: Deferred. Neuromuscular:      Speech clear, thoughts cogent. UE strength at biceps, triceps and finger flexion 4+/5 and symmetric. LE strength decreased proximally, HF 3-/5 (R), 3+/5 (L), with burning pain induced with movement; KE/KF 4/5, DF 4+/5 and symmetric bilaterally. Skin/extremity: No rashes, no erythema. Calves soft, non-tender B LE. No pedal edema.      Riley Fall Risk Assessment:  History of Falling: Yes     Swallow Assessment:  Swallow Screening  Is the patient unable to remain alert for testing?: No  Is the patient on a modified diet (thickened liquids) due to pre-existing dysphagia?: No  Is there presence of existing enteral tube feeding via the stomach or nose?: No  Is there presence of head-of-bed restrictions (less than 30 degrees)?: No  Is there presence of tracheotomy tube?: No  Is the patient ordered nothing-by-mouth status?: No  3 oz Water Swallow Screen: Pass        Labs/Studies:  Recent Results (from the past 72 hour(s))   POCT Glucose    Collection Time: 08/29/22 12:11 PM   Result Value Ref Range    POC Glucose 192 (H) 65 - 100 mg/dL    Performed by: Brice    POCT Glucose    Collection Time: 08/29/22  4:43 PM   Result Value Ref Range    POC Glucose 168 (H) 65 - 100 mg/dL    Performed by: NataA    POCT Glucose    Collection Time: 08/29/22  8:57 PM   Result Value Ref Range    POC Glucose 280 (H) 65 - 100 mg/dL    Performed by: [address] [place] [address]    Vancomycin Level, Random    Collection Time: 08/30/22  4:18 AM   Result Value Ref Range    Vancomycin Rm 15.7 UG/ML   Creatinine Collection Time: 08/30/22  4:18 AM   Result Value Ref Range    Creatinine 1.00 0.8 - 1.5 MG/DL   POCT Glucose    Collection Time: 08/30/22  6:08 AM   Result Value Ref Range    POC Glucose 140 (H) 65 - 100 mg/dL    Performed by: [facility] DR MARZENA WOODS    POCT Glucose    Collection Time: 08/30/22 11:44 AM   Result Value Ref Range    POC Glucose 169 (H) 65 - 100 mg/dL    Performed by: Brady    POCT Glucose    Collection Time: 08/30/22  4:29 PM   Result Value Ref Range    POC Glucose 179 (H) 65 - 100 mg/dL    Performed by: Susan    POCT Glucose    Collection Time: 08/30/22  8:54 PM   Result Value Ref Range    POC Glucose 244 (H) 65 - 100 mg/dL    Performed by: [facility] DR MARZENA WOODS    POCT Glucose    Collection Time: 08/31/22  6:02 AM   Result Value Ref Range    POC Glucose 136 (H) 65 - 100 mg/dL    Performed by: [facility] DR MARZENA WOODS    POCT Glucose    Collection Time: 08/31/22 12:00 PM   Result Value Ref Range    POC Glucose 282 (H) 65 - 100 mg/dL    Performed by: Lanny    POCT Glucose    Collection Time: 08/31/22  4:11 PM   Result Value Ref Range    POC Glucose 105 (H) 65 - 100 mg/dL    Performed by:  Lanny    POCT Glucose    Collection Time: 08/31/22  8:29 PM   Result Value Ref Range    POC Glucose 186 (H) 65 - 100 mg/dL    Performed by: Jaz    Vancomycin Level, Random    Collection Time: 09/01/22  6:30 AM   Result Value Ref Range    Vancomycin Rm 16.2 UG/ML   POCT Glucose    Collection Time: 09/01/22  6:48 AM   Result Value Ref Range    POC Glucose 140 (H) 65 - 100 mg/dL    Performed by: Jaz        Assessment:     Principal Problem:    Physical debility  Active Problems:    ICD (implantable cardioverter-defibrillator) in place    General weakness    Back pain    Anemia    Atrial fibrillation (HCC)    Complicated wound infection    HTN (hypertension)    Pulmonary emphysema (HCC)    COPD (chronic obstructive pulmonary disease) (HCC)    Cigarette nicotine dependence in remission    Diabetic neuropathy (HCC)    Chronic pain of right knee    Ischemic cardiomyopathy    PAD (peripheral artery disease) ([facility]    Hyperlipidemia    DM (diabetes mellitus) type II, controlled, with peripheral vascular disorder ([facility]    Obstructive sleep apnea    MINI (acute kidney injury) [facility]  Resolved Problems:    * No resolved hospital problems. 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 I have no doubt that pt can actively participate and tolerate as well as benefit from aggressive rehab. Hypo/HTN - BP fluctuating, managed medically. Continue amlodipine 5mg daily, carvedilol 25mg BID, losartan 100mg daily and furosemide 40mg daily. -8/28 /68 this AM, yesterday asymptomatic LOW BP 93/58, 103/52; will hold amlodipine, losartan; continue to monitor, adjust as needed  -8/29 /73 before AM meds, recheck before lunch; consider adding back amlodipine or losartan at 1/2- or 1/4-strength if needed  -8/30 symptomatic OH this AM before meds; /55, decrease carvedilol to 6.25mg BID, continue holding amlodipine, losartan  -8/31 /69 before AM meds, 118/66 after  -9/1 /55     Infectious disease - lumbar spine epidural abscess, psoas abscess, osteomyelitis of lower spine. Continue ceftriaxone and vancomycin through 9/9, already completed 6wks of ABX.  -8/28 WBC 6.5     Pain management - ongoing significant pain in back and paresthesic neuropathic pain requiring PRN pain meds. Will require regular pain assessment and comprehensive pain management. Had AMS with numerous pain medications post-operatively in acute and at [facility].  Will add gabapentin 100mg TID.  -8/28 continue scheduled APAP TID, naproxen BID, lidocaine patch  -8/29 unable to participate in ADLs earlier this AM due to pain; Physiatrist restarted oxycodone 5mg IR at 07h30, and by ~11h15, patient able to ambulate with PT   -8/31 increase gabapentin to 200mg TID; patient with initial good response to gabapentin  -9/1 reminded patient he has oxycodone IR for LS pain, has used only 2 doses since 8/29; he admits to aversion to narcotics, concerned about addiction    Diabetes mellitus - HgbA1c 6.0% (8/10/2022), was 5.5% in May prior to kyphoplasty. Continue Lantus 14u at bedtime, Humalog SSI. Adjust as needed. Per EMR, prior to admission he was on metformin 500mg BID, Januvia 100mg daily, glipizide XL 10mg daily. -8/28  this AM, yesterday 127, 209, 158, 151  -8/29  this AM; ranged 125-195 yesterday  -8/30  this AM, ranged 138-280 yesterday; resume home dose metformin 500mg BID (Cr / BUN normal 8/28)  -8/31  this AM, ranged 140-244 yesterday; resume glipizide (not XL) tomorrow but at 2.5mg strength  -9/1  this AM; observe today's values for effect from glipizide started this AM     Electrolyte abnormality - hypokalemia, K+ 3.4 (8/25); replace and monitor.   -8/28 K+ 3.7     Hyperlipidemia - continue daily Lipitor 20mg. Cardiomyopathy, CAD, history of Vtach - s/p ICD; compensated. Continue Plavix 75mg daily. Atrial fibrillation - continue amiodarone 200mg daily, Eliquis 5mg BID. Anemia - Hgb 8.8 on 8/22. History of normocytic anemia going back ~8y in EMR. Monitor.  -8/28 Hgb 7.9, recheck CBC in AM  -8/29 Hgb 7.8, drifting downward; consider PO iron     COPD - continue Dulera, Singulair and Mucinex; well compensated. 8/30, coarse breath sounds, upper airway wheeze - CXR with early pulmonary edema. Add one-time dose bumetanide 2mg now and continue daily furosemide 40mg.  -9/1 clear breath sounds, no pedal edema     Pneumonia prophylaxis - incentive spirometer every hour while awake.       DVT risk / DVT prophylaxis - daily physician / PA exam to assess as patient is at increased risk for of thromboembolism. Mobilize as tolerated. Sequential pneumatic compression devices (SCDs) when in bed; thigh-high or knee-high thromboembolic deterrent hose when out of bed. On Eliquis. GI prophylaxis - resume PPI. At times may need additional antacids, Maalox prn. History of tobacco use - stress abstinence, education. Reportedly quit 15y ago but continues with chewing tobacco.     General skin care / wound prevention - monitor general skin wound status daily per staff and physician / PA. At risk for failure due to impaired mobility. Bladder program / urinary retention / neurogenic bladder - schedule voids q6-8h. Check post-void residual as needed; in-and-out catheter if post-void residual is more than 400ml. Bowel program - at risk for constipation as a side effect of opioids, other medications, impaired mobility, etc. MiraLAX daily for regularity, Joanna-Colace for stool softener. PRN MOM, bisacodyl suppository or tablets for constipation. -8/30 c/o constipation, normally uses Fleets enema at home but declines that here; no rectal pressure, just mid-belly fullness; bisacodyl 10mg PO now  -8/31 no BM with bisacodyl PO, pharmacy does not have mag citrate; MOM 30ml now  -9/1 large BM x 2 after MOM yesterday     Sleep disruption, 8/29 - no relief from scheduled Vistaril 25mg at bedtime. Does not require medications for sleep at home. Start low-dose trazodone cautiously, as patient had AMS in [place] attributed to polypharmacy. -8/30 slept well on trazodone, denies hangover effect this AM  -8/31 sleeping well, continue trazodone          Time spent was 25 minutes with over 1/2 in direct patient care/examination, consultation and coordination of care. Signed By: Nabeel Sheffield PA-C    September 1, 2022      Physician Assistant with [facility]  Jaky Vann MD, Medical Director

## 2022-09-01 NOTE — PROGRESS NOTES
VANCO DAILY FOLLOW UP NOTE  [address] [facility] Pharmacokinetic Monitoring Service - Vancomycin    Consulting Provider: Dr. Jeremiah Young  Indication: L2-L4 osteodiscitis and psoas abscess  Target Concentration: Goal AUC/GIOVANNY 400-600 mg*hr/L  Duration of Therapy: EOT 9/9/22  Additional Antimicrobials: ceftriaxone    Pertinent Laboratory Values: Wt Readings from Last 1 Encounters:   08/26/22 237 lb (107.5 kg)     Temp Readings from Last 1 Encounters:   09/01/22 98.2 °F (36.8 °C)     Recent Labs     08/30/22  0418   CREATININE 1.00     Estimated Creatinine Clearance: 78 mL/min (based on SCr of 1 mg/dL). Lab Results   Component Value Date/Time    VANCOTROUGH 18.2 08/26/2022 07:30 AM    VANCORANDOM 16.2 09/01/2022 06:30 AM     MRSA Nasal Swab: N/A. Non-respiratory infection. Assessment:  Date/Time Dose Concentration AUC   8/26 0730 1000 mg q12h Tr = 18.2 [facility]   8/28 0006  1000 mg q12h 20.2 570   8/30 0418 750 mg q12h 15.7 402   9/1 0630 1250 mg q18h 16.2 448   Note: Serum concentrations collected for AUC dosing may appear elevated if collected in close proximity to the dose administered, this is not necessarily an indication of toxicity    Plan:  Current dosing regimen is therapeutic   Continue current dose of 1250 mg q18h (projected AUC/Tr of 448/13. 5)  Repeat vancomycin concentrations and renal labs ordered as clinically appropriate  Pharmacy will continue to monitor patient and adjust therapy as indicated    Thank you for the consult,  Alonzo Leslie [facility]

## 2022-09-01 NOTE — PROGRESS NOTES
Patient making progress but varies with mobility he will require a wheelchair at home. Plan of Care: Continue with plan of care.     Tremaine Oliver, PTA  9/1/2022

## 2022-09-01 NOTE — PROGRESS NOTES
OT Daily Note  Time In 1115   Time Out 1157     Pain: Pt has no complaints of pain. Functional Mobility   Pt was transferred to recliner with Ax2. Pt demonstrated poor control and awareness. Pt stood with Ax2 to remove brief. Pt used urinal with dependence. Cognition   Pt engaged with the C/ Caitlin 29 Test-B to improve problem solving to utilize during ADL. Pt got 50% with activity over 30 minutes with min verbal cueing for problem solving. Pt would not be safe to manage medications. Education   Benefits of being appropriate in therapy     Interdisciplinary Communication: DELORES Presley on pt's performance    Plan: Continue with POC. Pt was left in recliner with all needs within reach.      [facility] OTR/L  9/1/2022

## 2022-09-01 NOTE — PROGRESS NOTES
Physical Therapy  PHYSICAL THERAPY DAILY NOTE  Time In:  918 AM  Time Out:  1029 AM  Total Treatment Time:  (P) 71 Minutes  Pt. Seen for: AM, Gait Training, Therapeutic Exercise, and Transfer Training     Subjective: \" My butt is sore. \"         Objective:  Precautions: Poor Safety Awareness and Impulsive     GROSS ASSESSMENT Daily Assessment           COGNITION Daily Assessment           BED/MAT MOBILITY Daily Assessment    Rolling Right: Mod A  Rolling Left: Mod A  Supine to Sit: Max A  Sit to Supine: Max A       TRANSFERS Daily Assessment    Sit to Stand: Max A  Stand to Sit: Max A  Transfer Type: Lateral Pivot  Transfer Assistance: Mod A  Car Transfers: NT         GAIT Daily Assessment   Patient has a flexed posture and taking small steps. Amount of Assistance: Max A  Distance (ft): 30  Assistive Device: RW and Gait Belt  Surface: Level Surface       STEPS/STAIRS Daily Assessment    Steps Ambulated:    Level of Assistance:    Railing:  Assistive Device:        BALANCE Daily Assessment    Static Sitting:   Dynamic Sitting:   Static Standing:   Dynamic Standing:        WHEELCHAIR MOBILITY Daily Assessment    Able to Propel (ft):   Assistance:   Surface:   Wheelchair Set-up:        LOWER EXTREMITY EXERCISES Daily Assessment    Worked on rolling in bed from side to side. Aileen motomed x 10 minutes on gear3. Pain level: Pain reported in low back but not rated. Pain Location:  low back  Pain Interventions:     Vital Signs:  BP (!) 115/55   Pulse 65   Temp 98.2 °F (36.8 °C)   Resp 20   Ht 6' 1\" (1.854 m)   Wt 237 lb (107.5 kg)   SpO2 (!) 32%   BMI 31.27 kg/m²       Education:      Interdisciplinary Communication:     Pt. Left in wheelchair with call bell at reach. Assessment: Patient varies on how much assistance he requires from session to session. Patient will be safer in wheelchair in the home to be more independent. Plan of Care: Continue with plan of care.     Jahaira Mckeon, PTA  9/1/2022

## 2022-09-01 NOTE — PROGRESS NOTES
OT Daily Note  Time In 13   Time Out 1345     Pain: Pt has no complaints of pain. Functional Mobility   Transferred recliner to w/c with Ax2. Pt was reviewed on proper form prior to transfer. Pt reached for w/c vs using walker and sat on arm rest. Needed Ax2 to correct into the w/c. Coordination   Pt worked with resistive pop beads to improve B coordination and problem solving. Pt required min verbal cues, but overall did well. Strengthening   Pt completed the following exercises with 5 lb vivian to promote UB strength, activity tolerance, and shoulder stabilization for integration into functional mobility:  Exercise Reps Comments   Protraction/Retraction 1 min    Abduction/Adduction     Circles 1 min each direction 1/2 CW, 1/2 CCW   Vs 1 min     Pt demonstrated good quality during all exercises. Education   Transfer technique     Interdisciplinary Communication: DELORES Garner on transfers     Plan: Continue with POC. Pt was left with DELORES Garner.      Cyndi Orozco OTR/L  9/1/2022

## 2022-09-01 NOTE — PROGRESS NOTES
GOALS:   STG: Patient will complete functional verbal/visual reasoning activities with 80% accuracy with minimal assistance  STG: Patient will complete calculations with 80% accuracy with minimal assistance. STG: Patient will complete functional attention tasks with 80% accuracy with minimal assistance. STG: Patient will complete basic mental manipulation tasks with 80% accuracy with minimal assistance. STG: Patient will completed divergent naming tasks with 80% accuracy with minimal assistance. LTG: Patient will increase neuro-linguistic abilities to increase safety and awareness of deficits. SPEECH LANGUAGE PATHOLOGY: COMMUNICATION Daily Note #2    MRN: [id number]    ADMISSION DATE: 8/26/2022  PRIMARY DIAGNOSIS: Physical debility  Physical debility [R53.81]    ICD-10: Treatment Diagnosis: R41.841 Cognitive-Communication Deficit    RECOMMENDATIONS:   Recommendations: Follow up cognitive assessment     Patient continues to require skilled intervention:     Cognitive treatment     ASSESSMENT    Patient participated with treatment focusing on problem solving and memory. Decreased recall of any names of staff aside from \"[place]\". Attempted to assist patient to recall names of familiar staff that help him each day with little interest in learning our names. Patient was able to recall his room number today and tell ST how to get to his room from the day room. Basic reasoning related to [facility] stay, differentiating between disciplines, interpreting his daily schedule with Hipolito. Patient required verbal cues to recall time and money management tasks completed the previous session. Poor insight stating \"when it comes to money I don't miss a thing\". Discussed level of assistance required with previously attempted money management task and recommendation for assistance with higher level ADLs at home. GENERAL   Decreased eye contact.     Pain:        No current complaints of pain          OBJECTIVE Decreased problem solving and immediate/short-term memory. Suspect some cognitive deficits prior to admission now exacerbated with prolonged hospitalizaiton. Recommend ST to address cognitive function for improved independence and safety at discharge    PLAN    Duration/Frequency: Continue to follow patient 5x/week for duration of hospitalization and/or until goals met         MODIFIED JACK SCALE (mRS) SCORE: 1  Interpretation of Tool: The Modified Price Scale is a scale used to quantify level of disability as it relates to a patient's functional abilities. No Symptoms(0); No significant disability despite symptoms; able to carry out all usual duties and activities(1); Slight disability; unable to carry out all previous activities but able to look after own affairs without assistance(2); Moderate disability; requiring some help but able to walk without assistance(3); Moderately severe disability; unable to walk without assistance and unable to attend to own bodily needs without assistance(4); Severe disability; bedridden, incontinent, and requiring constant nursing care and attention(5)      Education:     Role of ST   Follow up session     Current Medications:   No current facility-administered medications on file prior to encounter. Current Outpatient Medications on File Prior to Encounter   Medication Sig Dispense Refill    furosemide (LASIX) 20 MG tablet Take 1 tablet by mouth in the morning. 60 tablet 3    tamsulosin (FLOMAX) 0.4 MG capsule Take 1 capsule by mouth in the morning. 30 capsule 3    pantoprazole (PROTONIX) 40 MG tablet Take 1 tablet by mouth in the morning and 1 tablet in the evening. Take before meals. 30 tablet 3    amLODIPine (NORVASC) 5 MG tablet Take 1 tablet by mouth in the morning. 30 tablet 3    OLANZapine zydis (ZYPREXA) 5 MG disintegrating tablet Take 1 tablet by mouth nightly 30 tablet 3    QUEtiapine (SEROQUEL) 25 MG tablet Take 1 tablet by mouth in the morning.  60 tablet 3 rosuvastatin (CRESTOR) 10 MG tablet TAKE 1 TABLET BY MOUTH EVERY DAY 90 tablet 0    tiZANidine (ZANAFLEX) 2 MG tablet Take 1 tablet by mouth every 8 hours as needed (muscle spasms) 30 tablet 1    fluticasone-salmeterol (ADVAIR) 250-50 MCG/ACT AEPB diskus inhaler Inhale 1 puff into the lungs in the morning and at bedtime      calcitonin (MIACALCIN) 200 UNIT/ACT nasal spray 1 spray by Nasal route daily 1 each 1    albuterol sulfate  (90 Base) MCG/ACT inhaler USE 2 PUFFS BY INHALATION 4 TIMES DAILY AS NEEDED FOR WHEEZING OR SHORTNESS OF BREATH.      amiodarone (CORDARONE) 200 MG tablet Take 200 mg by mouth 2 times daily      apixaban (ELIQUIS) 5 MG TABS tablet Take 5 mg by mouth every 12 hours      ascorbic acid (VITAMIN C) 500 MG tablet Take 500 mg by mouth      carvedilol (COREG) 25 MG tablet Take 25 mg by mouth 2 times daily (with meals)      Cholecalciferol 50 MCG (2000 UT) TABS Take 2,000 Units by mouth      clopidogrel (PLAVIX) 75 MG tablet Take 75 mg by mouth daily      fluticasone (FLONASE) 50 MCG/ACT nasal spray USE 1 OR 2 SPRAYS IN EACH NOSTRIL EVERY DAY.       glipiZIDE (GLUCOTROL XL) 10 MG extended release tablet TAKE 1 TABLET BY MOUTH TWICE DAILY      glucosamine-chondroitin 750-600 MG TABS tablet Take by mouth 2 times daily      guaiFENesin 1200 MG TB12 Take 1,200 mg by mouth 2 times daily as needed      latanoprost (XALATAN) 0.005 % ophthalmic solution Apply 1 drop to eye      magnesium oxide (MAG-OX) 400 (240 Mg) MG tablet TAKE 1 TABLET BY MOUTH EVERY DAY      montelukast (SINGULAIR) 10 MG tablet TAKE 1 TABLET BY MOUTH EVERY DAY AT BEDTIME      nitroGLYCERIN (NITROSTAT) 0.4 MG SL tablet Place 0.4 mg under the tongue      polyethylene glycol (GLYCOLAX) 17 GM/SCOOP powder Take 17 g by mouth daily      SITagliptin (JANUVIA) 100 MG tablet TAKE 1 TABLET BY MOUTH EVERY DAY      valsartan (DIOVAN) 320 MG tablet Take 320 mg by mouth daily      [DISCONTINUED] metFORMIN (GLUCOPHAGE) 500 MG tablet TAKE

## 2022-09-02 ENCOUNTER — APPOINTMENT (OUTPATIENT)
Dept: GENERAL RADIOLOGY | Age: 77
DRG: 094 | End: 2022-09-02
Attending: PHYSICAL MEDICINE & REHABILITATION
Payer: MEDICARE

## 2022-09-02 LAB
ANION GAP SERPL CALC-SCNC: 2 MMOL/L (ref 4–13)
BUN SERPL-MCNC: 20 MG/DL (ref 8–23)
CALCIUM SERPL-MCNC: 8.4 MG/DL (ref 8.3–10.4)
CHLORIDE SERPL-SCNC: 107 MMOL/L (ref 101–110)
CO2 SERPL-SCNC: 31 MMOL/L (ref 21–32)
CREAT SERPL-MCNC: 0.9 MG/DL (ref 0.8–1.5)
ERYTHROCYTE [DISTWIDTH] IN BLOOD BY AUTOMATED COUNT: 17.3 % (ref 11.9–14.6)
FERRITIN SERPL-MCNC: 207 NG/ML (ref 8–388)
GLUCOSE BLD STRIP.AUTO-MCNC: 111 MG/DL (ref 65–100)
GLUCOSE BLD STRIP.AUTO-MCNC: 136 MG/DL (ref 65–100)
GLUCOSE BLD STRIP.AUTO-MCNC: 152 MG/DL (ref 65–100)
GLUCOSE BLD STRIP.AUTO-MCNC: 87 MG/DL (ref 65–100)
GLUCOSE SERPL-MCNC: 128 MG/DL (ref 65–100)
HCT VFR BLD AUTO: 26.5 % (ref 41.1–50.3)
HGB BLD-MCNC: 8.3 G/DL (ref 13.6–17.2)
IRON SATN MFR SERPL: 9 %
IRON SERPL-MCNC: 14 UG/DL (ref 35–150)
MCH RBC QN AUTO: 30.4 PG (ref 26.1–32.9)
MCHC RBC AUTO-ENTMCNC: 31.3 G/DL (ref 31.4–35)
MCV RBC AUTO: 97.1 FL (ref 79.6–97.8)
NRBC # BLD: 0 K/UL (ref 0–0.2)
PLATELET # BLD AUTO: 237 K/UL (ref 150–450)
PMV BLD AUTO: 10 FL (ref 9.4–12.3)
POTASSIUM SERPL-SCNC: 3.9 MMOL/L (ref 3.5–5.1)
RBC # BLD AUTO: 2.73 M/UL (ref 4.23–5.6)
SERVICE CMNT-IMP: ABNORMAL
SERVICE CMNT-IMP: NORMAL
SODIUM SERPL-SCNC: 140 MMOL/L (ref 138–145)
TIBC SERPL-MCNC: 158 UG/DL (ref 250–450)
TRANSFERRIN SERPL-MCNC: 144 MG/DL (ref 202–364)
WBC # BLD AUTO: 9 K/UL (ref 4.3–11.1)

## 2022-09-02 PROCEDURE — 97530 THERAPEUTIC ACTIVITIES: CPT

## 2022-09-02 PROCEDURE — 99232 SBSQ HOSP IP/OBS MODERATE 35: CPT | Performed by: PHYSICIAN ASSISTANT

## 2022-09-02 PROCEDURE — 6370000000 HC RX 637 (ALT 250 FOR IP): Performed by: PHYSICAL MEDICINE & REHABILITATION

## 2022-09-02 PROCEDURE — 36415 COLL VENOUS BLD VENIPUNCTURE: CPT

## 2022-09-02 PROCEDURE — 71046 X-RAY EXAM CHEST 2 VIEWS: CPT

## 2022-09-02 PROCEDURE — 94760 N-INVAS EAR/PLS OXIMETRY 1: CPT

## 2022-09-02 PROCEDURE — 2580000003 HC RX 258: Performed by: PHYSICAL MEDICINE & REHABILITATION

## 2022-09-02 PROCEDURE — 1180000000 HC REHAB R&B

## 2022-09-02 PROCEDURE — 94640 AIRWAY INHALATION TREATMENT: CPT

## 2022-09-02 PROCEDURE — 97110 THERAPEUTIC EXERCISES: CPT

## 2022-09-02 PROCEDURE — 83540 ASSAY OF IRON: CPT

## 2022-09-02 PROCEDURE — 6360000002 HC RX W HCPCS: Performed by: PHYSICAL MEDICINE & REHABILITATION

## 2022-09-02 PROCEDURE — 92507 TX SP LANG VOICE COMM INDIV: CPT

## 2022-09-02 PROCEDURE — 85027 COMPLETE CBC AUTOMATED: CPT

## 2022-09-02 PROCEDURE — 2700000000 HC OXYGEN THERAPY PER DAY

## 2022-09-02 PROCEDURE — 82962 GLUCOSE BLOOD TEST: CPT

## 2022-09-02 PROCEDURE — 80048 BASIC METABOLIC PNL TOTAL CA: CPT

## 2022-09-02 PROCEDURE — 6370000000 HC RX 637 (ALT 250 FOR IP): Performed by: PHYSICIAN ASSISTANT

## 2022-09-02 PROCEDURE — 97542 WHEELCHAIR MNGMENT TRAINING: CPT

## 2022-09-02 PROCEDURE — 82728 ASSAY OF FERRITIN: CPT

## 2022-09-02 RX ORDER — DOXYCYCLINE HYCLATE 50 MG/1
324 CAPSULE, GELATIN COATED ORAL
Status: DISCONTINUED | OUTPATIENT
Start: 2022-09-03 | End: 2022-09-07 | Stop reason: HOSPADM

## 2022-09-02 RX ORDER — GLIPIZIDE 5 MG/1
5 TABLET ORAL
Status: DISCONTINUED | OUTPATIENT
Start: 2022-09-03 | End: 2022-09-07 | Stop reason: HOSPADM

## 2022-09-02 RX ADMIN — FUROSEMIDE 40 MG: 40 TABLET ORAL at 09:30

## 2022-09-02 RX ADMIN — AMIODARONE HYDROCHLORIDE 200 MG: 200 TABLET ORAL at 09:30

## 2022-09-02 RX ADMIN — INSULIN GLARGINE 14 UNITS: 100 INJECTION, SOLUTION SUBCUTANEOUS at 21:02

## 2022-09-02 RX ADMIN — CARVEDILOL 6.25 MG: 6.25 TABLET, FILM COATED ORAL at 17:21

## 2022-09-02 RX ADMIN — GUAIFENESIN 1200 MG: 600 TABLET ORAL at 09:30

## 2022-09-02 RX ADMIN — ALBUTEROL SULFATE 2 PUFF: 90 AEROSOL, METERED RESPIRATORY (INHALATION) at 17:26

## 2022-09-02 RX ADMIN — GABAPENTIN 200 MG: 100 CAPSULE ORAL at 09:29

## 2022-09-02 RX ADMIN — ACETAMINOPHEN 650 MG: 325 TABLET ORAL at 13:47

## 2022-09-02 RX ADMIN — FLUTICASONE PROPIONATE 1 SPRAY: 50 SPRAY, METERED NASAL at 09:31

## 2022-09-02 RX ADMIN — APIXABAN 5 MG: 5 TABLET, FILM COATED ORAL at 21:05

## 2022-09-02 RX ADMIN — CEFTRIAXONE 2000 MG: 2 INJECTION, POWDER, FOR SOLUTION INTRAMUSCULAR; INTRAVENOUS at 15:35

## 2022-09-02 RX ADMIN — ALBUTEROL SULFATE 2 PUFF: 90 AEROSOL, METERED RESPIRATORY (INHALATION) at 05:11

## 2022-09-02 RX ADMIN — ANTI-FUNGAL POWDER MICONAZOLE NITRATE TALC FREE: 1.42 POWDER TOPICAL at 09:32

## 2022-09-02 RX ADMIN — CALCITONIN SALMON 1 SPRAY: 200 SPRAY, METERED NASAL at 09:30

## 2022-09-02 RX ADMIN — PANTOPRAZOLE SODIUM 40 MG: 40 TABLET, DELAYED RELEASE ORAL at 17:21

## 2022-09-02 RX ADMIN — VANCOMYCIN HYDROCHLORIDE 1250 MG: 100 INJECTION, POWDER, LYOPHILIZED, FOR SOLUTION INTRAVENOUS at 22:14

## 2022-09-02 RX ADMIN — SODIUM CHLORIDE, PRESERVATIVE FREE 10 ML: 5 INJECTION INTRAVENOUS at 21:16

## 2022-09-02 RX ADMIN — APIXABAN 5 MG: 5 TABLET, FILM COATED ORAL at 09:30

## 2022-09-02 RX ADMIN — Medication 6 MG: at 21:04

## 2022-09-02 RX ADMIN — ATORVASTATIN CALCIUM 20 MG: 20 TABLET, FILM COATED ORAL at 21:04

## 2022-09-02 RX ADMIN — MOMETASONE FUROATE AND FORMOTEROL FUMARATE DIHYDRATE 2 PUFF: 100; 5 AEROSOL RESPIRATORY (INHALATION) at 17:26

## 2022-09-02 RX ADMIN — GABAPENTIN 200 MG: 100 CAPSULE ORAL at 13:47

## 2022-09-02 RX ADMIN — GLIPIZIDE 2.5 MG: 5 TABLET ORAL at 09:29

## 2022-09-02 RX ADMIN — MOMETASONE FUROATE AND FORMOTEROL FUMARATE DIHYDRATE 2 PUFF: 100; 5 AEROSOL RESPIRATORY (INHALATION) at 05:11

## 2022-09-02 RX ADMIN — VANCOMYCIN HYDROCHLORIDE 1250 MG: 100 INJECTION, POWDER, LYOPHILIZED, FOR SOLUTION INTRAVENOUS at 05:15

## 2022-09-02 RX ADMIN — GUAIFENESIN 1200 MG: 600 TABLET ORAL at 21:05

## 2022-09-02 RX ADMIN — METFORMIN HYDROCHLORIDE 500 MG: 500 TABLET ORAL at 09:30

## 2022-09-02 RX ADMIN — ACETAMINOPHEN 650 MG: 325 TABLET ORAL at 09:29

## 2022-09-02 RX ADMIN — SODIUM CHLORIDE, PRESERVATIVE FREE 10 ML: 5 INJECTION INTRAVENOUS at 09:48

## 2022-09-02 RX ADMIN — CARVEDILOL 6.25 MG: 6.25 TABLET, FILM COATED ORAL at 09:30

## 2022-09-02 RX ADMIN — MONTELUKAST 10 MG: 10 TABLET, FILM COATED ORAL at 21:05

## 2022-09-02 RX ADMIN — METFORMIN HYDROCHLORIDE 500 MG: 500 TABLET ORAL at 17:21

## 2022-09-02 RX ADMIN — LATANOPROST 1 DROP: 50 SOLUTION OPHTHALMIC at 21:12

## 2022-09-02 RX ADMIN — TRAZODONE HYDROCHLORIDE 25 MG: 50 TABLET ORAL at 21:05

## 2022-09-02 RX ADMIN — CLOPIDOGREL BISULFATE 75 MG: 75 TABLET ORAL at 09:30

## 2022-09-02 RX ADMIN — NAPROXEN 500 MG: 250 TABLET ORAL at 17:21

## 2022-09-02 RX ADMIN — NAPROXEN 500 MG: 250 TABLET ORAL at 09:29

## 2022-09-02 RX ADMIN — ACETAMINOPHEN 650 MG: 325 TABLET ORAL at 21:05

## 2022-09-02 RX ADMIN — PANTOPRAZOLE SODIUM 40 MG: 40 TABLET, DELAYED RELEASE ORAL at 05:15

## 2022-09-02 RX ADMIN — GABAPENTIN 200 MG: 100 CAPSULE ORAL at 21:04

## 2022-09-02 ASSESSMENT — PAIN SCALES - GENERAL
PAINLEVEL_OUTOF10: 0
PAINLEVEL_OUTOF10: 0

## 2022-09-02 NOTE — PROGRESS NOTES
AM Physical Therapy: Attempted to see patient at [phone] and 1130 AM for PT/OT co treat. Patient gone to ay.  Will attempt to see in PM    Nola Rodriguez, PT  9/2/2022

## 2022-09-02 NOTE — PROGRESS NOTES
[address]  Admit Date: 8/26/2022  Admit Diagnosis:   Physical debility [R53.81]    Subjective     Patient seen and examined during breakfast, wife present and feeding him. Reviewed AM labs, explained purpose of CXR, discussed med changes now, pain management options for home. Wife's numerous questions answered to her satisfaction. +Cough intermittently productive of clear sputum. Denies CP, palpitations, dyspnea. /59, Hgb 8.3 (improved), iron / TIBC / transferrin low.  this AM, ranged 109-187 yesterday after 1st dose glipizide 2.5mg.     Objective:     Current Facility-Administered Medications   Medication Dose Route Frequency    sennosides-docusate sodium (SENOKOT-S) 8.6-50 MG tablet 2 tablet  2 tablet Oral Daily    vancomycin (VANCOCIN) 1250 mg in sodium chloride 0.9% 250 mL IVPB  1,250 mg IntraVENous Q18H    gabapentin (NEURONTIN) capsule 200 mg  200 mg Oral TID    glipiZIDE (GLUCOTROL) tablet 2.5 mg  2.5 mg Oral QAM AC    carvedilol (COREG) tablet 6.25 mg  6.25 mg Oral BID WC    furosemide (LASIX) tablet 40 mg  40 mg Oral Daily    metFORMIN (GLUCOPHAGE) tablet 500 mg  500 mg Oral BID WC    oxyCODONE (ROXICODONE) immediate release tablet 5 mg  5 mg Oral Q4H PRN    ondansetron (ZOFRAN-ODT) disintegrating tablet 4 mg  4 mg Oral Q6H PRN    traZODone (DESYREL) tablet 25 mg  25 mg Oral Nightly    albuterol sulfate HFA (PROVENTIL;VENTOLIN;PROAIR) 108 (90 Base) MCG/ACT inhaler 2 puff  2 puff Inhalation BID    albuterol sulfate HFA (PROVENTIL;VENTOLIN;PROAIR) 108 (90 Base) MCG/ACT inhaler 2 puff  2 puff Inhalation Q6H PRN    sodium chloride flush 0.9 % injection 10 mL  10 mL IntraVENous BID    miconazole (MICOTIN) 2 % powder   Topical BID    acetaminophen (TYLENOL) tablet 650 mg  650 mg Oral TID    amiodarone (CORDARONE) tablet 200 mg  200 mg Oral Daily    apixaban (ELIQUIS) tablet 5 mg  5 mg Oral BID    atorvastatin (LIPITOR) tablet 20 mg  20 mg Oral Nightly    bisacodyl (DULCOLAX) suppository 10 mg  10 mg Rectal Daily PRN    calcitonin (MIACALCIN) nasal spray 1 spray  1 spray Alternating Nares Daily    clopidogrel (PLAVIX) tablet 75 mg  75 mg Oral Daily    fluticasone (FLONASE) 50 MCG/ACT nasal spray 1 spray  1 spray Each Nostril Daily    guaiFENesin (MUCINEX) extended release tablet 1,200 mg  1,200 mg Oral BID    insulin glargine (LANTUS) injection vial 14 Units  14 Units SubCUTAneous Nightly    insulin lispro (HUMALOG) injection vial 0-16 Units  0-16 Units SubCUTAneous TID WC    insulin lispro (HUMALOG) injection vial 0-4 Units  0-4 Units SubCUTAneous Nightly    latanoprost (XALATAN) 0.005 % ophthalmic solution 1 drop  1 drop Both Eyes Nightly    lidocaine 4 % external patch 1 patch  1 patch TransDERmal Daily    [Held by provider] losartan (COZAAR) tablet 100 mg  100 mg Oral Daily    melatonin tablet 6 mg  6 mg Oral Nightly    montelukast (SINGULAIR) tablet 10 mg  10 mg Oral Nightly    naproxen (NAPROSYN) tablet 500 mg  500 mg Oral BID WC    pantoprazole (PROTONIX) tablet 40 mg  40 mg Oral BID AC    polyethylene glycol (GLYCOLAX) packet 17 g  17 g Oral Daily    glucose chewable tablet 16 g  4 tablet Oral PRN    dextrose bolus 10% 125 mL  125 mL IntraVENous PRN    Or    dextrose bolus 10% 250 mL  250 mL IntraVENous PRN    glucagon (rDNA) injection 1 mg  1 mg SubCUTAneous PRN    dextrose 10 % infusion   IntraVENous Continuous PRN    mometasone-formoterol (DULERA) 100-5 MCG/ACT inhaler 2 puff  2 puff Inhalation BID    cefTRIAXone (ROCEPHIN) 2,000 mg in sodium chloride 0.9 % 50 mL IVPB mini-bag  2,000 mg IntraVENous Q24H        Review of Systems:   Denies headache, visual problems, abdominal pain, dysuria, calf pain. Pertinent positives are as noted in the HPI, ROS unremarkable otherwise.      Visit Vitals  BP (!) 118/59   Pulse 78   Temp 97.9 °F (36.6 °C) (Oral)   Resp 18   Ht 6' 1\" (1.854 m)   Wt 237 lb (107.5 kg)   SpO2 90%   BMI 31.27 kg/m²        Physical Exam:   General: Alert, appropriately oriented. Sitting up in bed with wife feeding him breakfast.   HEENT: Normocephalic, EOM intact. Oral mucosa moist.   Lungs: Coarse breath sounds bilaterally with rhonchi, noisy upper airway. Respirations even and unlabored. Heart: Regular rate, mostly regular underlying rhythm. No appreciable murmur but heart sounds muffled. Abdomen: Soft, non-tender, obese and protuberant but not distended. Bowel sounds normoactive, no organomegaly. Genitourinary: Deferred. Neuromuscular:      Speech clear, thoughts cogent. UE formal MMT not repeated today. LE strength at HF 3-/5 (R), 3+/5 (L), KE/KF 4+/5, DF/PF 5/5. Skin/extremity: No rashes, no erythema. Calves soft, non-tender B LE. Trace pedal edema with palpable DP pulses. Functional Assessment:  Per PT: max A for sit<>stand and lateral pivot transfer  Per OT: transferred recliner to w/c with A x 2, was reviewed on proper form prior to transfer. Pt reached for w/c vs using walker and sat on arm rest. Needed A x 2 to correct into the w/c. Worked with resistive pop beads to improve B coordination and problem solving, required min verbal cues, but overall did well. Completed exercises with 5 lb vivian to promote UB strength, activity tolerance and shoulder stabilization for integration into functional mobility. Riley Fall Risk Assessment:  History of Falling: Yes     Swallow Assessment:  Swallow Screening  Is the patient unable to remain alert for testing?: No  Is the patient on a modified diet (thickened liquids) due to pre-existing dysphagia?: No  Is there presence of existing enteral tube feeding via the stomach or nose?: No  Is there presence of head-of-bed restrictions (less than 30 degrees)?: No  Is there presence of tracheotomy tube?: No  Is the patient ordered nothing-by-mouth status?: No  3 oz Water Swallow Screen: Pass      Ambulation:  Still not ambulatory.     Labs/Studies:  Recent Results (from the past 72 hour(s))   POCT Glucose    Collection Time: 08/30/22 11:44 AM   Result Value Ref Range    POC Glucose 169 (H) 65 - 100 mg/dL    Performed by: Brady    POCT Glucose    Collection Time: 08/30/22  4:29 PM   Result Value Ref Range    POC Glucose 179 (H) 65 - 100 mg/dL    Performed by: Susan    POCT Glucose    Collection Time: 08/30/22  8:54 PM   Result Value Ref Range    POC Glucose 244 (H) 65 - 100 mg/dL    Performed by: [facility] DR MARZENA WOODS    POCT Glucose    Collection Time: 08/31/22  6:02 AM   Result Value Ref Range    POC Glucose 136 (H) 65 - 100 mg/dL    Performed by: [facility] DR MARZENA WODOS    POCT Glucose    Collection Time: 08/31/22 12:00 PM   Result Value Ref Range    POC Glucose 282 (H) 65 - 100 mg/dL    Performed by: Lanny    POCT Glucose    Collection Time: 08/31/22  4:11 PM   Result Value Ref Range    POC Glucose 105 (H) 65 - 100 mg/dL    Performed by: Lanny    POCT Glucose    Collection Time: 08/31/22  8:29 PM   Result Value Ref Range    POC Glucose 186 (H) 65 - 100 mg/dL    Performed by: Jaz    Vancomycin Level, Random    Collection Time: 09/01/22  6:30 AM   Result Value Ref Range    Vancomycin Rm 16.2 UG/ML   POCT Glucose    Collection Time: 09/01/22  6:48 AM   Result Value Ref Range    POC Glucose 140 (H) 65 - 100 mg/dL    Performed by: Jaz    POCT Glucose    Collection Time: 09/01/22 12:07 PM   Result Value Ref Range    POC Glucose 187 (H) 65 - 100 mg/dL    Performed by: Reilly    POCT Glucose    Collection Time: 09/01/22  4:41 PM   Result Value Ref Range    POC Glucose 109 (H) 65 - 100 mg/dL    Performed by:  AdriannePCT    POCT Glucose    Collection Time: 09/01/22  8:33 PM   Result Value Ref Range    POC Glucose 164 (H) 65 - 100 mg/dL    Performed by: [facility]    Basic Metabolic Panel w/ Reflex to MG    Collection Time: 09/02/22  5:54 AM   Result Value Ref Range    Sodium 140 138 - 145 mmol/L    Potassium 3.9 3.5 - 5.1 mmol/L    Chloride 107 101 - 110 mmol/L    CO2 31 21 - 32 mmol/L    Anion Gap 2 (L) 4 - 13 mmol/L    Glucose 128 (H) 65 - 100 mg/dL    BUN 20 8 - 23 MG/DL    Creatinine 0.90 0.8 - 1.5 MG/DL    GFR African American >60 >60 ml/min/1.73m2    GFR Non- >60 >60 ml/min/1.73m2    Calcium 8.4 8.3 - 10.4 MG/DL   Transferrin Saturation    Collection Time: 09/02/22  5:54 AM   Result Value Ref Range    Iron 14 (L) 35 - 150 ug/dL    TIBC 158 (L) 250 - 450 ug/dL    TRANSFERRIN SATURATION 9 (L) >20 %   Ferritin    Collection Time: 09/02/22  5:54 AM   Result Value Ref Range    Ferritin 207 8 - 388 NG/ML   Transferrin    Collection Time: 09/02/22  5:54 AM   Result Value Ref Range    Transferrin 144 (L) 202 - 364 mg/dL   CBC    Collection Time: 09/02/22  5:55 AM   Result Value Ref Range    WBC 9.0 4.3 - 11.1 K/uL    RBC 2.73 (L) 4.23 - 5.6 M/uL    Hemoglobin 8.3 (L) 13.6 - 17.2 g/dL    Hematocrit 26.5 (L) 41.1 - 50.3 %    MCV 97.1 79.6 - 97.8 FL    MCH 30.4 26.1 - 32.9 PG    MCHC 31.3 (L) 31.4 - 35.0 g/dL    RDW 17.3 (H) 11.9 - 14.6 %    Platelets 119 939 - 858 K/uL    MPV 10.0 9.4 - 12.3 FL    nRBC 0.00 0.0 - 0.2 K/uL   POCT Glucose    Collection Time: 09/02/22  6:51 AM   Result Value Ref Range    POC Glucose 136 (H) 65 - 100 mg/dL    Performed by: Margoth        Assessment:     Principal Problem:    Physical debility  Active Problems:    ICD (implantable cardioverter-defibrillator) in place    General weakness    Back pain    Anemia    Atrial fibrillation (HCC)    Complicated wound infection    HTN (hypertension)    Pulmonary emphysema (HCC)    COPD (chronic obstructive pulmonary disease) (HCC)    Cigarette nicotine dependence in remission    Diabetic neuropathy (HCC)    Chronic pain of right knee    Ischemic cardiomyopathy    PAD (peripheral artery disease) ([facility])    Hyperlipidemia    DM (diabetes mellitus) type II, controlled, with peripheral vascular disorder (HCC)    Obstructive sleep apnea    MINI (acute kidney injury) ([facility] [address]  Resolved Problems:    * No resolved hospital problems. 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 I have no doubt that pt can actively participate and tolerate as well as benefit from aggressive rehab. Hypo/HTN - BP fluctuating, managed medically. Continue amlodipine 5mg daily, carvedilol 25mg BID, losartan 100mg daily and furosemide 40mg daily. -8/28 /68 this AM, yesterday asymptomatic LOW BP 93/58, 103/52; will hold amlodipine, losartan; continue to monitor, adjust as needed  -8/29 /73 before AM meds, recheck before lunch; consider adding back amlodipine or losartan at 1/2- or 1/4-strength if needed  -8/30 symptomatic OH this AM before meds; /55, decrease carvedilol to 6.25mg BID, continue holding amlodipine, losartan  -8/31 /69 before AM meds, 118/66 after  -9/1 /55  -9/2 /59; current meds: carvedilol 6.25mg BID, furosemide 40mg daily    Infectious disease - lumbar spine epidural abscess, psoas abscess, osteomyelitis of lower spine. Continue ceftriaxone and vancomycin through 9/9, already completed 6wks of ABX.  -8/28 WBC 6.5  -9/2 WBC 9.0, patient remains afebrile     Pain management - ongoing significant pain in back and paresthesic neuropathic pain requiring PRN pain meds. Will require regular pain assessment and comprehensive pain management. Had AMS with numerous pain medications post-operatively in acute and at [facility].  Will add gabapentin 100mg TID.  -8/28 continue scheduled APAP TID, naproxen BID, lidocaine patch  -8/29 unable to participate in ADLs earlier this AM due to pain; Physiatrist restarted oxycodone 5mg IR at 07h30, and by ~11h15, patient able to ambulate with PT   -8/31 increase gabapentin to 200mg TID; patient with initial good response to gabapentin  -9/1 reminded patient he has oxycodone IR for LS pain, has used only 2 doses since 8/29; he admits to aversion to narcotics, concerned about addiction  -9/2 doing well on gabapentin 200mg TID     Diabetes mellitus - HgbA1c 6.0% (8/10/2022), was 5.5% in May prior to kyphoplasty. Continue Lantus 14u at bedtime, Humalog SSI. Adjust as needed. Per EMR, prior to admission he was on metformin 500mg BID, Januvia 100mg daily, glipizide XL 10mg daily. -8/28  this AM, yesterday 127, 209, 158, 151  -8/29  this AM; ranged 125-195 yesterday  -8/30  this AM, ranged 138-280 yesterday; resume home dose metformin 500mg BID (Cr / BUN normal 8/28)  -8/31  this AM, ranged 140-244 yesterday; resume glipizide (not XL) tomorrow but at 2.5mg strength  -9/1  this AM; observe today's values for effect from glipizide started this AM  -9/2  this AM, ranged 109-187 (better) yesterday after 1st dose glipizide 2.5mg; room for improvement, will increase to 5mg starting tomorrow     Electrolyte abnormality - hypokalemia, K+ 3.4 (8/25); replace and monitor.   -8/28 K+ 3.7  -9/2 normal BMP this AM     Hyperlipidemia - continue daily Lipitor 20mg. Cardiomyopathy, CAD, history of Vtach - s/p ICD; compensated. Continue Plavix 75mg daily. Atrial fibrillation - continue amiodarone 200mg daily, Eliquis 5mg BID. Anemia - Hgb 8.8 on 8/22. History of normocytic anemia going back ~8y in EMR. Monitor.  -8/28 Hgb 7.9, recheck CBC in AM  -8/29 Hgb 7.8, drifting downward; consider PO iron  -9/2 Hgb 8.3, iron studies with low iron / TIBC / transferrin; add PO iron with breakfast     COPD - continue Dulera, Singulair and Mucinex; well compensated. 8/30, coarse breath sounds, upper airway wheeze - CXR with early pulmonary edema.  Add one-time dose bumetanide 2mg now and continue daily furosemide 40mg.  -9/1 clear breath sounds, no pedal edema  -9/2 increased O2 needs with CPAP overnight, rhonchi: repeat CXR this AM     Pneumonia prophylaxis - incentive spirometer every hour while awake. DVT risk / DVT prophylaxis - daily physician / PA exam to assess as patient is at increased risk for of thromboembolism. Mobilize as tolerated. Sequential pneumatic compression devices (SCDs) when in bed; thigh-high or knee-high thromboembolic deterrent hose when out of bed. On Eliquis. GI prophylaxis - resume PPI. At times may need additional antacids, Maalox prn. History of tobacco use - stress abstinence, education. Reportedly quit 15y ago but continues with chewing tobacco.     General skin care / wound prevention - monitor general skin wound status daily per staff and physician / PA. At risk for failure due to impaired mobility. Bladder program / urinary retention / neurogenic bladder - schedule voids q6-8h. Check post-void residual as needed; in-and-out catheter if post-void residual is more than 400ml. Bowel program - at risk for constipation as a side effect of opioids, other medications, impaired mobility, etc. MiraLAX daily for regularity, Joanna-Colace for stool softener. PRN MOM, bisacodyl suppository or tablets for constipation. -8/30 c/o constipation, normally uses Fleets enema at home but declines that here; no rectal pressure, just mid-belly fullness; bisacodyl 10mg PO now  -8/31 no BM with bisacodyl PO, pharmacy does not have mag citrate; MOM 30ml now  -9/1 large BM x 2 after MOM yesterday     Sleep disruption, 8/29 - no relief from scheduled Vistaril 25mg at bedtime. Does not require medications for sleep at home. Start low-dose trazodone cautiously, as patient had AMS in [place] attributed to polypharmacy. -8/30 slept well on trazodone, denies hangover effect this AM  -8/31 sleeping well, continue trazodone        Time spent was 25 minutes with over 1/2 in direct patient care/examination, consultation and coordination of care.      Signed By: Mona Shah PA-C September 2, 2022      Physician Assistant with [facility]  Jaky Taveras MD, Medical Director

## 2022-09-02 NOTE — PROGRESS NOTES
GOALS:   STG: Patient will complete functional verbal/visual reasoning activities with 80% accuracy with minimal assistance  STG: Patient will complete calculations with 80% accuracy with minimal assistance. STG: Patient will complete functional attention tasks with 80% accuracy with minimal assistance. STG: Patient will complete basic mental manipulation tasks with 80% accuracy with minimal assistance. STG: Patient will completed divergent naming tasks with 80% accuracy with minimal assistance. LTG: Patient will increase neuro-linguistic abilities to increase safety and awareness of deficits. SPEECH LANGUAGE PATHOLOGY: COMMUNICATION Daily Note #3    MRN: [id number]    ADMISSION DATE: 8/26/2022  PRIMARY DIAGNOSIS: Physical debility  Physical debility [R53.81]    ICD-10: Treatment Diagnosis: R41.841 Cognitive-Communication Deficit    RECOMMENDATIONS:   Recommendations: Follow up cognitive assessment     Patient continues to require skilled intervention:     Cognitive treatment     ASSESSMENT    Patient was seen upon arrival back to the floor from xray. Requested coffee only for lunch due to eating his breakfast really late. Sequencing four basic written steps to complete health and grooming related activities completed with patient completing 3/10 independently. Required mod-max cues to recognize errors in sequencing and correct. Required verbal cues prior to each topic to read all of the choices prior to initiating the task with decreased attention and immediate recall. Wife alluded to the fact that she provides a significant amount of assistance at home stating \"write ask my wife about it as your answer on the top of the page. \"      GENERAL   Sitting up in the bed. Pain:        No current complaints of pain          OBJECTIVE   Decreased problem solving and immediate/short-term memory.   Suspect some cognitive deficits prior to admission now exacerbated with prolonged [facility]. Recommend ST to address cognitive function for improved independence and safety at discharge    PLAN    Duration/Frequency: Continue to follow patient 5x/week for duration of hospitalization and/or until goals met         MODIFIED JACK SCALE (mRS) SCORE: 1  Interpretation of Tool: The Modified Bulloch Scale is a scale used to quantify level of disability as it relates to a patient's functional abilities. No Symptoms(0); No significant disability despite symptoms; able to carry out all usual duties and activities(1); Slight disability; unable to carry out all previous activities but able to look after own affairs without assistance(2); Moderate disability; requiring some help but able to walk without assistance(3); Moderately severe disability; unable to walk without assistance and unable to attend to own bodily needs without assistance(4); Severe disability; bedridden, incontinent, and requiring constant nursing care and attention(5)      Education:     Role of ST   Follow up session     Current Medications:   No current facility-administered medications on file prior to encounter. Current Outpatient Medications on File Prior to Encounter   Medication Sig Dispense Refill    furosemide (LASIX) 20 MG tablet Take 1 tablet by mouth in the morning. 60 tablet 3    tamsulosin (FLOMAX) 0.4 MG capsule Take 1 capsule by mouth in the morning. 30 capsule 3    pantoprazole (PROTONIX) 40 MG tablet Take 1 tablet by mouth in the morning and 1 tablet in the evening. Take before meals. 30 tablet 3    amLODIPine (NORVASC) 5 MG tablet Take 1 tablet by mouth in the morning. 30 tablet 3    OLANZapine zydis (ZYPREXA) 5 MG disintegrating tablet Take 1 tablet by mouth nightly 30 tablet 3    QUEtiapine (SEROQUEL) 25 MG tablet Take 1 tablet by mouth in the morning.  60 tablet 3    rosuvastatin (CRESTOR) 10 MG tablet TAKE 1 TABLET BY MOUTH EVERY DAY 90 tablet 0    tiZANidine (ZANAFLEX) 2 MG tablet Take 1 tablet by mouth every 8 hours as needed (muscle spasms) 30 tablet 1    fluticasone-salmeterol (ADVAIR) 250-50 MCG/ACT AEPB diskus inhaler Inhale 1 puff into the lungs in the morning and at bedtime      calcitonin (MIACALCIN) 200 UNIT/ACT nasal spray 1 spray by Nasal route daily 1 each 1    albuterol sulfate  (90 Base) MCG/ACT inhaler USE 2 PUFFS BY INHALATION 4 TIMES DAILY AS NEEDED FOR WHEEZING OR SHORTNESS OF BREATH.      amiodarone (CORDARONE) 200 MG tablet Take 200 mg by mouth 2 times daily      apixaban (ELIQUIS) 5 MG TABS tablet Take 5 mg by mouth every 12 hours      ascorbic acid (VITAMIN C) 500 MG tablet Take 500 mg by mouth      carvedilol (COREG) 25 MG tablet Take 25 mg by mouth 2 times daily (with meals)      Cholecalciferol 50 MCG (2000 UT) TABS Take 2,000 Units by mouth      clopidogrel (PLAVIX) 75 MG tablet Take 75 mg by mouth daily      fluticasone (FLONASE) 50 MCG/ACT nasal spray USE 1 OR 2 SPRAYS IN EACH NOSTRIL EVERY DAY.       glipiZIDE (GLUCOTROL XL) 10 MG extended release tablet TAKE 1 TABLET BY MOUTH TWICE DAILY      glucosamine-chondroitin 750-600 MG TABS tablet Take by mouth 2 times daily      guaiFENesin 1200 MG TB12 Take 1,200 mg by mouth 2 times daily as needed      latanoprost (XALATAN) 0.005 % ophthalmic solution Apply 1 drop to eye      magnesium oxide (MAG-OX) 400 (240 Mg) MG tablet TAKE 1 TABLET BY MOUTH EVERY DAY      montelukast (SINGULAIR) 10 MG tablet TAKE 1 TABLET BY MOUTH EVERY DAY AT BEDTIME      nitroGLYCERIN (NITROSTAT) 0.4 MG SL tablet Place 0.4 mg under the tongue      polyethylene glycol (GLYCOLAX) 17 GM/SCOOP powder Take 17 g by mouth daily      SITagliptin (JANUVIA) 100 MG tablet TAKE 1 TABLET BY MOUTH EVERY DAY      valsartan (DIOVAN) 320 MG tablet Take 320 mg by mouth daily      [DISCONTINUED] metFORMIN (GLUCOPHAGE) 500 MG tablet TAKE TWO TABLETS BY MOUTH 2 TIMES A DAY       PRECAUTIONS/ALLERGIES: Azithromycin and Oxaprozin          Therapy Time  SLP Individual Minutes  Time In: 1158  Time Out: [address] [place] [address]  Minutes: 28     SLP Total Treatment Time  Total Treatment Time: [address]  Areli Knee, [address]  9/2/2022 12:34 PM

## 2022-09-02 NOTE — PROGRESS NOTES
SPEECH PATHOLOGY NOTE:    Attempted to see patient for cognitive treatment. Currently off the floor for a chest xray. Will re-attempt when patient is available.     Bettye Dominique MS, CCC-SLP

## 2022-09-02 NOTE — PROGRESS NOTES
AM Physical Therapy:Attempted to see patient at 1000 AM scheduled time. Patient had not eaten breakfast or had AM ADL. Wife requesting to allow patient to eat breakfast prior to PT. Discussed plan to check back later this AM and attempt to see for PT.  Patient and wife agreed    Lupe Mendoza, PT  9/2/2022

## 2022-09-02 NOTE — CARE COORDINATION
Received return call from spouse via v/m. Contacted spouse with no answer, v/m left. Hoping to address family training and ramp need. CM to continue to follow and monitor for any further needs. Update 3492: Received return call from spouse. Spouse moore snot have camera to take picture but is able to report she has a 7 inch step to get pt on porch (portable ramp to be placed) and a 3 inch step into kitchen. Spouse does confirm BSC and Rollator. Spouse would like wide/bariatric BSC as pt moore snot feel he can sit on the [facility] at home. Spouse purchased used but aware insurance will not cover bariatric BSC as pt weight does not meet requirements. Spouse agreeable to purchase and this CM will look at cost efficient options. Spouse also hopeful for w/c and pt only has a loaner from a friend. Will discuss with therapy. Family training set for Wednesday, 9/7 at [address].

## 2022-09-02 NOTE — PROGRESS NOTES
OT DAILY NOTE  Time In 1300   Time Out 1345     Subjective: Pt agreeable to treatment. Pain:  Pt reports hip pain throughout session  . Interdisciplinary Communication: Collaborated with PT co-treated, discussed t/fs and importance of UB strengthening. Precautions: Falls, Impulsive, and Poor Safety Awareness    BP (!) 118/59   Pulse 78   Temp 97.9 °F (36.6 °C) (Oral)   Resp 18   Ht 6' 1\" (1.854 m)   Wt 237 lb (107.5 kg)   SpO2 90%   BMI 31.27 kg/m²      FUNCTIONAL MOBILITY:    Score Comments   Supine to Sit Partial/moderate assistance A for trunk   Sit to Stand Dependent MOD A x2    Transfer Assist Dependent  MAX A x2         - Activity Tolerance - Coordination - Strengthening   Pt practiced two stands at parallel bars with MAX A and w/c s/u behind pt. Constant VC required for stand technique and to maintain balance. Pt presented with general weakness and knees began to buckle during stand.      - Strengthening   Pt utilized the Perception Software to increase BUE strength to promote increased independence with transfers and functional mobility. Pt lifted 12 lbs for 2 sets of 10 reps. Good UB strength and activity tolerance demonstrated. Hip pain worsens with shoulder flexion, rickshaw exercise did not elicit hip pain. - Activity Tolerance - Coordination   Pt was educated on slide board t/f and completed with MOD A x2 and continuous VC for technique and hand placement. Pt then completed EOM activity with support provided at back. Pt demonstrates ability to maintain a dynamic sit with back support, hand propping, and continuous VC for posture. Pt sorted cards shifting weight on L to R hands while in supported sit. Education   Benefits of OT, Functional Transfer Training, and Safety Awareness     Assessment: Patient slowly progressing with functional movement and transfer training but significantly limited by pain. Pt demonstrated good participation in OT treatment.  Pt continues to benefit from skilled OT services to address remaining deficits and progress toward baseline level of independence and safety. Patient ended session seated in gym with PT. Plan: Continue OT POC.      Nikkie Pike OT   9/2/2022

## 2022-09-02 NOTE — PROGRESS NOTES
VANCO DAILY FOLLOW UP NOTE  [address] [facility] Pharmacokinetic Monitoring Service - Vancomycin    Consulting Provider: Dr. Marie Fox   Indication: L2-L4 osteodiscitis  Target Concentration: Goal AUC/GIOVANNY 400-600 mg*hr/L  Duration of Therapy: EOT 9/9/22  Additional Antimicrobials: ceftriaxone    Pertinent Laboratory Values: Wt Readings from Last 1 Encounters:   08/26/22 237 lb (107.5 kg)     Temp Readings from Last 1 Encounters:   09/02/22 97.9 °F (36.6 °C) (Oral)     Recent Labs     09/02/22  0554 09/02/22  0555   BUN 20  --    CREATININE 0.90  --    WBC  --  9.0     Estimated Creatinine Clearance: 87 mL/min (based on SCr of 0.9 mg/dL). Lab Results   Component Value Date/Time    VANCOTROUGH 18.2 08/26/2022 07:30 AM    VANCORANDOM 16.2 09/01/2022 06:30 AM       MRSA Nasal Swab: N/A. Non-respiratory infection. Assessment:  Date/Time Dose Concentration AUC   8/26 0730 1000 mg q12h Tr = 18.2 [facility]   8/28 0006  1000 mg q12h 20.2 570   8/30 0418 750 mg q12h 15.7 402   9/1 0630 1250 mg q18h 16.2 448   Note: Serum concentrations collected for AUC dosing may appear elevated if collected in close proximity to the dose administered, this is not necessarily an indication of toxicity    Plan:  Current dosing regimen is therapeutic   Continue current dose of 1250 mg q18h (projected AUC/Tr of 413/12. 2)  Repeat vancomycin concentrations and renal labs ordered as clinically appropriate  Pharmacy will continue to monitor patient and adjust therapy as indicated    Thank you for the consult,  Adarsh Astorga, [facility]

## 2022-09-02 NOTE — PROGRESS NOTES
PHYSICAL WEEKLY PROGRESS NOTE    TIME IN: 1300  TIME OUT: 1430  Total Treatment Time: 90 Minutes    Subjective: Patient reporting he is hurting more this PM. Reports he cant remember when he had pain medication. Reports increased pain with supine to sit to stand. Reports his legs feel weak and he does not think he can stand up. Objective:     Precautions:   Falls     Vital Signs:Visit Vitals  BP (!) 118/59   Pulse 78   Temp 97.9 °F (36.6 °C) (Oral)   Resp 18   Ht 6' 1\" (1.854 m)   Wt 237 lb (107.5 kg)   SpO2 90%   BMI 31.27 kg/m²         Pain level: 4 to 8 out of 10, increases with supine to sit to stand. Decreases in sitting with back support and in supine  Pain location: low back with radiating pain through iliac creat and sometimes into groin area  Pain interventions: Medication per order, rest, positioning,relaxation,body mechanics        Patient education: Bed mobility training,transfer training, balance training,fall precautions, body mechanics,activity pacing, w/c mobility and parts management, Patient verbalizing understanding and demonstrating partial understanding of patient education. Recommend follow up education. Interdisciplinary Communication: co treat with OT. Discussed functional status and progress towards goals and D/C plans. Spoke with MD regarding decline in functional status due to increased pain     Cognition: Alert, able to follow commands,cooperating, limited motivation and participation due to pain.        Lower Extremity Function:      LLE RLE    AROM Hip decreased 50%, knee and ankle 25% Hip decreased 75%, knee and ankle 25%   Fine Motor Coordination Impaired Impaired   Tone Normal Normal   Sensation Light Touch Intact Light Touch Impaired: diminished as compared to L LE                     MMT Initial Asssessment    Right Lower Extremity Left Lower Extremity   Hip Flexion +2/5 2/5   Hip Extension +2/5 +2/5   Hip Abduction +2/5 2/5   Hip Adduction +2/5 2+/5   Knee Extension -3/5 -3/5   Knee Flexion +2/5 +2/5   Ankle Dorsiflexion 4/5 4/5   Ankle Plantarflexion +2/5 +2/5       Functional Assessment:    Balance  Comments   Static Sitting Good:  Pt. able to maintain balance w/o UE support;  exhibits some postural sway    Dynamic Sitting Fair - accepts minimal challenge;  can maintain balance while turning head/trunk    Static Standing Poor:  Pt. requires UE support & mod-max A to maintain position With RW, flexed posture with difficulty straightening knees. When able to straighten knees then patient flexes at trunk. Significant wt bearing through UE s on RW   Dynamic Standing Poor - unable to accept challenge or move without LOB  With RW, unable to self correct loss of balance. BED MOBILITY &TRANSFERS Initial Assessment   Weekly Assessment Comments   Rolling  10         Min assist to left and right on mat with no rails and head and shoulders on wedge with pillows    Increased time and effort to complete with cues for body mechanics     Supine to Sit 10          Max assist, increased pain with log rolling and supine<>sidelying<>sit   Increased time and effort to complete with cues for body mechanics     Sit to to Supine 10          Dependent assist, mod assist with upper and lower body, assist of 2   Increased time and effort to complete with cues for body mechanics       Sit to Stand 3  Partial/moderate assistance      Mod with with RW   Able to complete sit<>stand with RW from elevated bed x 3 attempts with mod assist with RW  Sit<>stand x 3 in II bars with min to mod assist x 2   Increased time and effort to complete with cues for body mechanics     Chair To/From Bed 3  Partial/moderate assistance       SPT bed to w/c with mod assist from PT and min assist from OT. Flexed posture with knees buckling during transfer. Increased time and effort to complete with cues for body mechanics    First transfer was SPT with RW.  Unable to complete SPT with RW after that due to increased pain and LE weakness. Sliding board transfer w/c<>mat and and w/c to bed with mod assist from PT and min assist from OT   Car Transfer 88       NT                WHEELCHAIR MOBILITY/MANAGEMENT Initial Assessment Weekly Assessment Comments   Able to Propel 50' w/ 2 Turns 1  Dependent      150ft with min assist and multiple cues. Slow pace with occasional rest breaks, Limited UE ROM limiting arc of motion while propelling wheelchair        Able to Propel 150' 1Dependent    As noted above          Wheelchair set up assistance required  [employer] R Brake, Manages [place], and Manages R Armrest          AMBULATION Initial Assessment Weekly Assessment Comments   Assistive device  NT Attempted gait with RW but unable due to knees buckling when attempting to take a step. Attempted gait in II bars x 2 but unable to due knees buckling and increased pain. Walk 10' 1          NA       Walk 48' with 2 Turns 88       NA          Walk 150' 88          NA       Walking 10' on Unlevel Surface 88          NA             STEPS/STAIRS Initial Assessment Weekly Assessment Comments   1 Curb Step 88             Unable to ambulate as noted above. Recommending ramp for home access    4 Steps 88              NA   12 Steps 88               NA   Curbs/Ramps  NT  NA      Treatment Interventions:   SUPINE EXERCISES Sets Reps Comments, Increased time and effort to complete with multiple and frequent rest breaks. Cues for correct form  Mod assist to complete   Ankle Pumps 3 10    Glut Sets 3 10    Heel Slides 3 10    Hip Abduction 3 10    Short Arc Quad 3 10      Static sitting balance activities short sitting on mat with theraball behind back for back support while OT facilitating UE reaching activities and cognitive activities with SBA and cues to min assist and cues for sitting balance control and posture correction.     Rickshaw x 3 sets of 10 with weights as per OT note    Patient returned to feet with Supervision/Standby Assist in 2 weeks       Erika Oneal, PT  9/2/2022

## 2022-09-03 LAB
GLUCOSE BLD STRIP.AUTO-MCNC: 109 MG/DL (ref 65–100)
GLUCOSE BLD STRIP.AUTO-MCNC: 126 MG/DL (ref 65–100)
GLUCOSE BLD STRIP.AUTO-MCNC: 193 MG/DL (ref 65–100)
GLUCOSE BLD STRIP.AUTO-MCNC: 74 MG/DL (ref 65–100)
SERVICE CMNT-IMP: ABNORMAL
SERVICE CMNT-IMP: NORMAL

## 2022-09-03 PROCEDURE — 2700000000 HC OXYGEN THERAPY PER DAY

## 2022-09-03 PROCEDURE — 6370000000 HC RX 637 (ALT 250 FOR IP): Performed by: PHYSICIAN ASSISTANT

## 2022-09-03 PROCEDURE — 2580000003 HC RX 258: Performed by: PHYSICAL MEDICINE & REHABILITATION

## 2022-09-03 PROCEDURE — 97542 WHEELCHAIR MNGMENT TRAINING: CPT

## 2022-09-03 PROCEDURE — 6370000000 HC RX 637 (ALT 250 FOR IP): Performed by: PHYSICAL MEDICINE & REHABILITATION

## 2022-09-03 PROCEDURE — 97110 THERAPEUTIC EXERCISES: CPT

## 2022-09-03 PROCEDURE — 1180000000 HC REHAB R&B

## 2022-09-03 PROCEDURE — 97530 THERAPEUTIC ACTIVITIES: CPT

## 2022-09-03 PROCEDURE — 82962 GLUCOSE BLOOD TEST: CPT

## 2022-09-03 PROCEDURE — 94640 AIRWAY INHALATION TREATMENT: CPT

## 2022-09-03 PROCEDURE — 94660 CPAP INITIATION&MGMT: CPT

## 2022-09-03 PROCEDURE — 94760 N-INVAS EAR/PLS OXIMETRY 1: CPT

## 2022-09-03 PROCEDURE — 6360000002 HC RX W HCPCS: Performed by: PHYSICAL MEDICINE & REHABILITATION

## 2022-09-03 PROCEDURE — 99232 SBSQ HOSP IP/OBS MODERATE 35: CPT | Performed by: PHYSICAL MEDICINE & REHABILITATION

## 2022-09-03 RX ORDER — TRAMADOL HYDROCHLORIDE 50 MG/1
50 TABLET ORAL EVERY 6 HOURS PRN
Status: DISCONTINUED | OUTPATIENT
Start: 2022-09-03 | End: 2022-09-07 | Stop reason: HOSPADM

## 2022-09-03 RX ORDER — GABAPENTIN 300 MG/1
300 CAPSULE ORAL 3 TIMES DAILY
Status: DISCONTINUED | OUTPATIENT
Start: 2022-09-03 | End: 2022-09-07 | Stop reason: HOSPADM

## 2022-09-03 RX ADMIN — ACETAMINOPHEN 650 MG: 325 TABLET ORAL at 21:29

## 2022-09-03 RX ADMIN — FLUTICASONE PROPIONATE 1 SPRAY: 50 SPRAY, METERED NASAL at 08:36

## 2022-09-03 RX ADMIN — FUROSEMIDE 40 MG: 40 TABLET ORAL at 08:12

## 2022-09-03 RX ADMIN — ACETAMINOPHEN 650 MG: 325 TABLET ORAL at 13:34

## 2022-09-03 RX ADMIN — ALBUTEROL SULFATE 2 PUFF: 90 AEROSOL, METERED RESPIRATORY (INHALATION) at 06:10

## 2022-09-03 RX ADMIN — MOMETASONE FUROATE AND FORMOTEROL FUMARATE DIHYDRATE 2 PUFF: 100; 5 AEROSOL RESPIRATORY (INHALATION) at 06:10

## 2022-09-03 RX ADMIN — CEFTRIAXONE 2000 MG: 2 INJECTION, POWDER, FOR SOLUTION INTRAMUSCULAR; INTRAVENOUS at 15:30

## 2022-09-03 RX ADMIN — MOMETASONE FUROATE AND FORMOTEROL FUMARATE DIHYDRATE 2 PUFF: 100; 5 AEROSOL RESPIRATORY (INHALATION) at 16:06

## 2022-09-03 RX ADMIN — METFORMIN HYDROCHLORIDE 500 MG: 500 TABLET ORAL at 17:19

## 2022-09-03 RX ADMIN — GABAPENTIN 300 MG: 300 CAPSULE ORAL at 13:34

## 2022-09-03 RX ADMIN — SODIUM CHLORIDE, PRESERVATIVE FREE 10 ML: 5 INJECTION INTRAVENOUS at 21:35

## 2022-09-03 RX ADMIN — SENNOSIDES AND DOCUSATE SODIUM 2 TABLET: 8.6; 5 TABLET ORAL at 08:12

## 2022-09-03 RX ADMIN — GABAPENTIN 200 MG: 100 CAPSULE ORAL at 08:12

## 2022-09-03 RX ADMIN — CLOPIDOGREL BISULFATE 75 MG: 75 TABLET ORAL at 08:13

## 2022-09-03 RX ADMIN — SODIUM CHLORIDE, PRESERVATIVE FREE 10 ML: 5 INJECTION INTRAVENOUS at 08:37

## 2022-09-03 RX ADMIN — APIXABAN 5 MG: 5 TABLET, FILM COATED ORAL at 21:30

## 2022-09-03 RX ADMIN — METFORMIN HYDROCHLORIDE 500 MG: 500 TABLET ORAL at 08:13

## 2022-09-03 RX ADMIN — AMIODARONE HYDROCHLORIDE 200 MG: 200 TABLET ORAL at 08:13

## 2022-09-03 RX ADMIN — Medication 6 MG: at 21:30

## 2022-09-03 RX ADMIN — GUAIFENESIN 1200 MG: 600 TABLET ORAL at 08:12

## 2022-09-03 RX ADMIN — POLYETHYLENE GLYCOL 3350 17 G: 17 POWDER, FOR SOLUTION ORAL at 08:12

## 2022-09-03 RX ADMIN — VANCOMYCIN HYDROCHLORIDE 1250 MG: 100 INJECTION, POWDER, LYOPHILIZED, FOR SOLUTION INTRAVENOUS at 15:35

## 2022-09-03 RX ADMIN — PANTOPRAZOLE SODIUM 40 MG: 40 TABLET, DELAYED RELEASE ORAL at 17:19

## 2022-09-03 RX ADMIN — PANTOPRAZOLE SODIUM 40 MG: 40 TABLET, DELAYED RELEASE ORAL at 05:20

## 2022-09-03 RX ADMIN — APIXABAN 5 MG: 5 TABLET, FILM COATED ORAL at 08:13

## 2022-09-03 RX ADMIN — INSULIN GLARGINE 14 UNITS: 100 INJECTION, SOLUTION SUBCUTANEOUS at 21:27

## 2022-09-03 RX ADMIN — MONTELUKAST 10 MG: 10 TABLET, FILM COATED ORAL at 21:30

## 2022-09-03 RX ADMIN — ALBUTEROL SULFATE 2 PUFF: 90 AEROSOL, METERED RESPIRATORY (INHALATION) at 16:06

## 2022-09-03 RX ADMIN — NAPROXEN 500 MG: 250 TABLET ORAL at 17:19

## 2022-09-03 RX ADMIN — CARVEDILOL 6.25 MG: 6.25 TABLET, FILM COATED ORAL at 08:12

## 2022-09-03 RX ADMIN — LATANOPROST 1 DROP: 50 SOLUTION OPHTHALMIC at 21:29

## 2022-09-03 RX ADMIN — ATORVASTATIN CALCIUM 20 MG: 20 TABLET, FILM COATED ORAL at 21:29

## 2022-09-03 RX ADMIN — ANTI-FUNGAL POWDER MICONAZOLE NITRATE TALC FREE: 1.42 POWDER TOPICAL at 21:34

## 2022-09-03 RX ADMIN — CARVEDILOL 6.25 MG: 6.25 TABLET, FILM COATED ORAL at 17:19

## 2022-09-03 RX ADMIN — GLIPIZIDE 5 MG: 5 TABLET ORAL at 08:12

## 2022-09-03 RX ADMIN — TRAZODONE HYDROCHLORIDE 25 MG: 50 TABLET ORAL at 21:29

## 2022-09-03 RX ADMIN — ACETAMINOPHEN 650 MG: 325 TABLET ORAL at 08:13

## 2022-09-03 RX ADMIN — NAPROXEN 500 MG: 250 TABLET ORAL at 08:12

## 2022-09-03 RX ADMIN — OXYCODONE 5 MG: 5 TABLET ORAL at 10:03

## 2022-09-03 RX ADMIN — GUAIFENESIN 1200 MG: 600 TABLET ORAL at 21:29

## 2022-09-03 RX ADMIN — FERROUS GLUCONATE 324 MG: 324 TABLET ORAL at 12:23

## 2022-09-03 RX ADMIN — GABAPENTIN 300 MG: 300 CAPSULE ORAL at 21:29

## 2022-09-03 ASSESSMENT — PAIN DESCRIPTION - DESCRIPTORS: DESCRIPTORS: ACHING;THROBBING

## 2022-09-03 ASSESSMENT — PAIN SCALES - GENERAL
PAINLEVEL_OUTOF10: 7
PAINLEVEL_OUTOF10: 0

## 2022-09-03 ASSESSMENT — PAIN DESCRIPTION - LOCATION: LOCATION: BACK

## 2022-09-03 NOTE — PROGRESS NOTES
PHYSICAL THERAPY DAILY NOTE  Time In:  944  Time Out:  1047  Total Treatment Time:  61 Minutes  Pt. Seen for: AM, Balance Training, Therapeutic Exercise, Transfer Training, Wheelchair mobility, and Other     Subjective: Patient reporting he feels about the same. Reports pain is severe during supine to sit to stand and in standing. Reports his legs feels weak like they might buckle. Reports he wants to walk but can't. Expressing frustration regarding progress with therapy. Objective:  Precautions: Falls    GROSS ASSESSMENT Daily Assessment           COGNITION Daily Assessment    Alert, able to follow commands,cooperating,participating, motivated,          BED/MAT MOBILITY Daily Assessment   Increased time and effort to complete with cues for body mechanics  All bed mobility in hospital bed. Rolling Right: Mod A  Rolling Left: Mod A  Supine to Sit: Mod A  Sit to Supine: Mod A       TRANSFERS Daily Assessment   Sit<>stand x 2 with RW attempting SPT with RW but unable due to pain and knees buckling  Sit<>stand x 2 in II bars attempting gait training but unable due to pain and knees buckling Sit to Stand: Mod A with RW  Stand to Sit: Mod A with RW  Transfer Type: Sliding Board bed<>w/c, Increased time and effort to complete with cues for body mechanics    Transfer Assistance:  Mod A  Car Transfers: NT         GAIT Daily Assessment           STEPS/STAIRS Daily Assessment           BALANCE Daily Assessment    Static Sitting: Fair:  Pt. requires UE support;  may need occasional min A  Dynamic Sitting: Poor - unable to accept challenge or move without LOB   Static Standing: Poor:  Pt. requires UE support & mod-max A to maintain position  Dynamic Standing: Poor - unable to accept challenge or move without LOB        WHEELCHAIR MOBILITY Daily Assessment    Able to Propel (ft): 150ft with supervision to min assist for turning wheelchair  Assistance: Supervision/Standby Assist and Min A  Surface: Level Surface  Wheelchair Set-up: Manages R Brake and Manages L Brake       LOWER EXTREMITY EXERCISES Daily Assessment   LE motomed x 10 min at level 1 SEATED EXERCISES Sets Reps Comments, Increased time and effort to complete with multiple and frequent rest breaks. Cues for correct form  Min assist to complete   Ankle PF 1 30    Ankle DF 1 30    Long Arc Quads ith red t-band 3 10    Hamstring Curls with red t-band Theraband 3 10           Pain level: 5 to 10 out of 10, pain increases during supine to sit to stand. Increases with prolonged standing  Pain Location:  low back radiating into iliac crest and down into posterior thigh  Pain Interventions: Medication per order, rest, positioning,relaxation, body mechanics      Vital Signs:  Visit Vitals  /63   Pulse 64   Temp 98 °F (36.7 °C) (Oral)   Resp 18   Ht 6' 1\" (1.854 m)   Wt 237 lb (107.5 kg)   SpO2 95%   BMI 31.27 kg/m²         Education:  Bed mobility training,transfer training, balance training,fall precautions, body mechanics,activity pacing,w/c mobility and parts management, Patient verbalizing understanding and demonstrating partial understanding of patient education. Recommend follow up education. Interdisciplinary Communication: spoke with MD regarding decline in functional status and pain symptoms. Spoke with RN regarding decline in functional status and pain medication schedule. RN administered pain medication during treatment    Patient return to room at end of treatment and remained up in wheelchair with needs in reach wife in room with patient. Assessment: Difficulty with body mechanics during sliding board transfers due to fluctuating sudden onset of pain during transfer training. Unable to progress to gait training this AM due to pain level. Only able to tolerate 10 to 20 seconds of standing before radiating pain causes knees to buckle. Progress remains limited at this time due to pain. Plan of Care: Continue with POC and progress as tolerated. Dedra Winkler, PT  9/3/2022

## 2022-09-03 NOTE — PROGRESS NOTES
Pt found wearing home CPAP on room air with an O2 saturation of 85%. Placed pt on 2L via bleed in through CPAP. O2 saturation increased to 92%. RN notified. No signs of distress noted. Will continue to monitor.

## 2022-09-03 NOTE — H&P
[id number]  Admit Date: 8/26/2022  Admit Diagnosis:   Physical debility [R53.81]    Subjective     Patient seen and examined. Severe LBP with pain across ant hips and pelvis. Could not participate in therapy today. Intermittent cough. Spt clear. Reports continence of b/b.   Objective:     Current Facility-Administered Medications   Medication Dose Route Frequency    glipiZIDE (GLUCOTROL) tablet 5 mg  5 mg Oral QAM AC    ferrous gluconate (FERGON) tablet 324 mg  324 mg Oral Daily with breakfast    sennosides-docusate sodium (SENOKOT-S) 8.6-50 MG tablet 2 tablet  2 tablet Oral Daily    vancomycin (VANCOCIN) 1250 mg in sodium chloride 0.9% 250 mL IVPB  1,250 mg IntraVENous Q18H    gabapentin (NEURONTIN) capsule 200 mg  200 mg Oral TID    carvedilol (COREG) tablet 6.25 mg  6.25 mg Oral BID WC    furosemide (LASIX) tablet 40 mg  40 mg Oral Daily    metFORMIN (GLUCOPHAGE) tablet 500 mg  500 mg Oral BID WC    oxyCODONE (ROXICODONE) immediate release tablet 5 mg  5 mg Oral Q4H PRN    ondansetron (ZOFRAN-ODT) disintegrating tablet 4 mg  4 mg Oral Q6H PRN    traZODone (DESYREL) tablet 25 mg  25 mg Oral Nightly    albuterol sulfate HFA (PROVENTIL;VENTOLIN;PROAIR) 108 (90 Base) MCG/ACT inhaler 2 puff  2 puff Inhalation BID    albuterol sulfate HFA (PROVENTIL;VENTOLIN;PROAIR) 108 (90 Base) MCG/ACT inhaler 2 puff  2 puff Inhalation Q6H PRN    sodium chloride flush 0.9 % injection 10 mL  10 mL IntraVENous BID    miconazole (MICOTIN) 2 % powder   Topical BID    acetaminophen (TYLENOL) tablet 650 mg  650 mg Oral TID    amiodarone (CORDARONE) tablet 200 mg  200 mg Oral Daily    apixaban (ELIQUIS) tablet 5 mg  5 mg Oral BID    atorvastatin (LIPITOR) tablet 20 mg  20 mg Oral Nightly    bisacodyl (DULCOLAX) suppository 10 mg  10 mg Rectal Daily PRN    calcitonin (MIACALCIN) nasal spray 1 spray  1 spray Alternating Nares Daily    clopidogrel (PLAVIX) tablet 75 mg  75 mg Oral Daily fluticasone (FLONASE) 50 MCG/ACT nasal spray 1 spray  1 spray Each Nostril Daily    guaiFENesin (MUCINEX) extended release tablet 1,200 mg  1,200 mg Oral BID    insulin glargine (LANTUS) injection vial 14 Units  14 Units SubCUTAneous Nightly    insulin lispro (HUMALOG) injection vial 0-16 Units  0-16 Units SubCUTAneous TID WC    insulin lispro (HUMALOG) injection vial 0-4 Units  0-4 Units SubCUTAneous Nightly    latanoprost (XALATAN) 0.005 % ophthalmic solution 1 drop  1 drop Both Eyes Nightly    lidocaine 4 % external patch 1 patch  1 patch TransDERmal Daily    [Held by provider] losartan (COZAAR) tablet 100 mg  100 mg Oral Daily    melatonin tablet 6 mg  6 mg Oral Nightly    montelukast (SINGULAIR) tablet 10 mg  10 mg Oral Nightly    naproxen (NAPROSYN) tablet 500 mg  500 mg Oral BID WC    pantoprazole (PROTONIX) tablet 40 mg  40 mg Oral BID AC    polyethylene glycol (GLYCOLAX) packet 17 g  17 g Oral Daily    glucose chewable tablet 16 g  4 tablet Oral PRN    dextrose bolus 10% 125 mL  125 mL IntraVENous PRN    Or    dextrose bolus 10% 250 mL  250 mL IntraVENous PRN    glucagon (rDNA) injection 1 mg  1 mg SubCUTAneous PRN    dextrose 10 % infusion   IntraVENous Continuous PRN    mometasone-formoterol (DULERA) 100-5 MCG/ACT inhaler 2 puff  2 puff Inhalation BID    cefTRIAXone (ROCEPHIN) 2,000 mg in sodium chloride 0.9 % 50 mL IVPB mini-bag  2,000 mg IntraVENous Q24H        Review of Systems:   Denies chest pain, shortness of breath, cough, headache, visual problems, abdominal pain, dysuria, calf pain. Pertinent positives are as noted in the HPI, ROS unremarkable otherwise. Visit Vitals  /63   Pulse 64   Temp 98 °F (36.7 °C) (Oral)   Resp 18   Ht 6' 1\" (1.854 m)   Wt 237 lb (107.5 kg)   SpO2 95%   BMI 31.27 kg/m²        Physical Exam:   General: Alert and age appropriately oriented. No acute cardiorespiratory distress. HEENT: Normocephalic, EOM intact. Oral mucosa moist without cyanosis.    Lungs: Bs coarse, good inspiratory effort. No wheezes or rhonchi  Respiration even and unlabored. Heart: Regular rate and rhythm, S1, S2. No murmurs, clicks, rub or gallops. Abdomen: Soft, non-tender, not distended. Bowel sounds normoactive. No organomegaly. obese   Genitourinary: Deferred. Neuromuscular:      LE strength at HF 3-/5 (R), 3+/5 (L), KE/KF 4+/5, DF/PF 5/  No sensory deficits distally. Exam limited by pain. Skin/extremity: No rashes, no erythema. No calf tenderness B LE. Trace pedal edema                                                                              Functional Assessment:        Riley Fall Risk Assessment:  History of Falling: Yes     Swallow Assessment:  Swallow Screening  Is the patient unable to remain alert for testing?: No  Is the patient on a modified diet (thickened liquids) due to pre-existing dysphagia?: No  Is there presence of existing enteral tube feeding via the stomach or nose?: No  Is there presence of head-of-bed restrictions (less than 30 degrees)?: No  Is there presence of tracheotomy tube?: No  Is the patient ordered nothing-by-mouth status?: No  3 oz Water Swallow Screen: Pass      Ambulation:            Labs/Studies:  Recent Results (from the past 72 hour(s))   POCT Glucose    Collection Time: 08/31/22  4:11 PM   Result Value Ref Range    POC Glucose 105 (H) 65 - 100 mg/dL    Performed by: Lanny    POCT Glucose    Collection Time: 08/31/22  8:29 PM   Result Value Ref Range    POC Glucose 186 (H) 65 - 100 mg/dL    Performed by: Jaz    Vancomycin Level, Random    Collection Time: 09/01/22  6:30 AM   Result Value Ref Range    Vancomycin Rm 16.2 UG/ML   POCT Glucose    Collection Time: 09/01/22  6:48 AM   Result Value Ref Range    POC Glucose 140 (H) 65 - 100 mg/dL    Performed by: Jaz    POCT Glucose    Collection Time: 09/01/22 12:07 PM   Result Value Ref Range    POC Glucose 187 (H) 65 - 100 mg/dL    Performed by:  Reilly    POCT Glucose    Collection Time: 09/01/22  4:41 PM   Result Value Ref Range    POC Glucose 109 (H) 65 - 100 mg/dL    Performed by:  Reilly    POCT Glucose    Collection Time: 09/01/22  8:33 PM   Result Value Ref Range    POC Glucose 164 (H) 65 - 100 mg/dL    Performed by: [facility]    Basic Metabolic Panel w/ Reflex to MG    Collection Time: 09/02/22  5:54 AM   Result Value Ref Range    Sodium 140 138 - 145 mmol/L    Potassium 3.9 3.5 - 5.1 mmol/L    Chloride 107 101 - 110 mmol/L    CO2 31 21 - 32 mmol/L    Anion Gap 2 (L) 4 - 13 mmol/L    Glucose 128 (H) 65 - 100 mg/dL    BUN 20 8 - 23 MG/DL    Creatinine 0.90 0.8 - 1.5 MG/DL    GFR African American >60 >60 ml/min/1.73m2    GFR Non- >60 >60 ml/min/1.73m2    Calcium 8.4 8.3 - 10.4 MG/DL   Transferrin Saturation    Collection Time: 09/02/22  5:54 AM   Result Value Ref Range    Iron 14 (L) 35 - 150 ug/dL    TIBC 158 (L) 250 - 450 ug/dL    TRANSFERRIN SATURATION 9 (L) >20 %   Ferritin    Collection Time: 09/02/22  5:54 AM   Result Value Ref Range    Ferritin 207 8 - 388 NG/ML   Transferrin    Collection Time: 09/02/22  5:54 AM   Result Value Ref Range    Transferrin 144 (L) 202 - 364 mg/dL   CBC    Collection Time: 09/02/22  5:55 AM   Result Value Ref Range    WBC 9.0 4.3 - 11.1 K/uL    RBC 2.73 (L) 4.23 - 5.6 M/uL    Hemoglobin 8.3 (L) 13.6 - 17.2 g/dL    Hematocrit 26.5 (L) 41.1 - 50.3 %    MCV 97.1 79.6 - 97.8 FL    MCH 30.4 26.1 - 32.9 PG    MCHC 31.3 (L) 31.4 - 35.0 g/dL    RDW 17.3 (H) 11.9 - 14.6 %    Platelets 266 656 - 712 K/uL    MPV 10.0 9.4 - 12.3 FL    nRBC 0.00 0.0 - 0.2 K/uL   POCT Glucose    Collection Time: 09/02/22  6:51 AM   Result Value Ref Range    POC Glucose 136 (H) 65 - 100 mg/dL    Performed by: Margoth    POCT Glucose    Collection Time: 09/02/22 11:58 AM   Result Value Ref Range    POC Glucose 152 (H) 65 - 100 mg/dL    Performed by: Bri    POCT Glucose    Collection Time: 09/02/22  4:17 PM   Result 127, 209, 158, 151  -8/29  this AM; ranged 125-195 yesterday  -8/30  this AM, ranged 138-280 yesterday; resume home dose metformin 500mg BID (Cr / BUN normal 8/28)  -8/31  this AM, ranged 140-244 yesterday; resume glipizide (not XL) tomorrow but at 2.5mg strength  -9/1  this AM; observe today's values for effect from glipizide started this AM  -9/2  this AM, ranged 109-187 (better) yesterday after 1st dose glipizide 2.5mg; room for improvement, will increase to 5mg starting tomorrow  -9/3  this am, 111 at bedtime, 87 at supper, 152 at lunch and 136 yesterday a.m. Lunch today 193; increasing glipizide to 5mg daily cont Metformin 500mg bid and Lantus 14u at bedtime     Electrolyte abnormality - hypokalemia, K+ 3.4 (8/25); replace and monitor.   -8/28 K+ 3.7  -9/2 normal BMP this AM     Hyperlipidemia - continue daily Lipitor 20mg. Cardiomyopathy, CAD, history of Vtach - s/p ICD; compensated. Continue Plavix 75mg daily. Atrial fibrillation - continue amiodarone 200mg daily, Eliquis 5mg BID. Anemia - Hgb 8.8 on 8/22. History of normocytic anemia going back ~8y in EMR. Monitor.  -8/28 Hgb 7.9, recheck CBC in AM  -8/29 Hgb 7.8, drifting downward; consider PO iron  -9/2 Hgb 8.3, iron studies with low iron / TIBC / transferrin; add PO iron with breakfast     COPD - continue Dulera, Singulair and Mucinex; well compensated. 8/30, coarse breath sounds, upper airway wheeze - CXR with early pulmonary edema. Add one-time dose bumetanide 2mg now and continue daily furosemide 40mg.  -9/1 clear breath sounds, no pedal edema  -9/2 increased O2 needs with CPAP overnight, rhonchi: repeat CXR this AM; 9/3 xray with no change. Down to using 2L O2. Pneumonia prophylaxis - incentive spirometer every hour while awake. DVT risk / DVT prophylaxis - daily physician / PA exam to assess as patient is at increased risk for of thromboembolism. Mobilize as tolerated.  Sequential pneumatic compression devices (SCDs) when in bed; thigh-high or knee-high thromboembolic deterrent hose when out of bed. On Eliquis. GI prophylaxis - resume PPI. At times may need additional antacids, Maalox prn. History of tobacco use - stress abstinence, education. Reportedly quit 15y ago but continues with chewing tobacco.     General skin care / wound prevention - monitor general skin wound status daily per staff and physician / PA. At risk for failure due to impaired mobility. Bladder program / urinary retention / neurogenic bladder - schedule voids q6-8h. Check post-void residual as needed; in-and-out catheter if post-void residual is more than 400ml. Bowel program - at risk for constipation as a side effect of opioids, other medications, impaired mobility, etc. MiraLAX daily for regularity, Joanna-Colace for stool softener. PRN MOM, bisacodyl suppository or tablets for constipation. -8/30 c/o constipation, normally uses Fleets enema at home but declines that here; no rectal pressure, just mid-belly fullness; bisacodyl 10mg PO now  -8/31 no BM with bisacodyl PO, pharmacy does not have mag citrate; MOM 30ml now  -9/1 large BM x 2 after MOM yesterday  -9/3 states his bowels have improved     Sleep disruption, 8/29 - no relief from scheduled Vistaril 25mg at bedtime. Does not require medications for sleep at home. Start low-dose trazodone cautiously, as patient had AMS in [place] attributed to polypharmacy. -8/30 slept well on trazodone, denies hangover effect this AM  -8/31 sleeping well, continue trazodone                  Time spent was 25 minutes with over 1/2 in direct patient care/examination, consultation and coordination of care.      Signed By: Chuy Arnold MD     September 3, 2022

## 2022-09-03 NOTE — PROGRESS NOTES
VANCO DAILY FOLLOW UP NOTE  [address] Pharmacokinetic Monitoring Service - Vancomycin    Consulting Provider: Dr. Sherryle Meier   Indication: L2-L4 osteodiscitis  Target Concentration: Goal AUC/GIOVANNY 400-600 mg*hr/L  Duration of Therapy: EOT 9/9/22  Additional Antimicrobials: ceftriaxone    Pertinent Laboratory Values: Wt Readings from Last 1 Encounters:   08/26/22 237 lb (107.5 kg)     Temp Readings from Last 1 Encounters:   09/03/22 98 °F (36.7 °C) (Oral)     Recent Labs     09/02/22  0554 09/02/22  0555   BUN 20  --    CREATININE 0.90  --    WBC  --  9.0     Estimated Creatinine Clearance: 87 mL/min (based on SCr of 0.9 mg/dL). Lab Results   Component Value Date/Time    VANCOTROUGH 18.2 08/26/2022 07:30 AM    VANCORANDOM 16.2 09/01/2022 06:30 AM       MRSA Nasal Swab: N/A. Non-respiratory infection. Assessment:  Date/Time Dose Concentration AUC   8/26 0730 1000 mg q12h Tr = 18.2 [facility]   8/28 0006  1000 mg q12h 20.2 570   8/30 0418 750 mg q12h 15.7 402   9/1 0630 1250 mg q18h 16.2 448   Note: Serum concentrations collected for AUC dosing may appear elevated if collected in close proximity to the dose administered, this is not necessarily an indication of toxicity    Plan:  Current dosing regimen is therapeutic   Continue current dose of 1250 mg q18h (projected AUC/Tr of 413/12. 2)  Repeat vancomycin concentrations and renal labs ordered as clinically appropriate  Pharmacy will continue to monitor patient and adjust therapy as indicated    Thank you for the consult,  Scott Banks.  Magnolia Smith, [facility] [place]

## 2022-09-03 NOTE — PROGRESS NOTES
MRI consent has been completed. Patient has an implanted ICD/PM [facility] 77 [id number]. RN notified per hospital policy, patient's MRI will be done during normal working hours on Tuesday due to the holiday on Monday.

## 2022-09-04 LAB
GLUCOSE BLD STRIP.AUTO-MCNC: 120 MG/DL (ref 65–100)
GLUCOSE BLD STRIP.AUTO-MCNC: 131 MG/DL (ref 65–100)
GLUCOSE BLD STRIP.AUTO-MCNC: 154 MG/DL (ref 65–100)
GLUCOSE BLD STRIP.AUTO-MCNC: 164 MG/DL (ref 65–100)
SERVICE CMNT-IMP: ABNORMAL

## 2022-09-04 PROCEDURE — 2700000000 HC OXYGEN THERAPY PER DAY

## 2022-09-04 PROCEDURE — 6370000000 HC RX 637 (ALT 250 FOR IP): Performed by: PHYSICAL MEDICINE & REHABILITATION

## 2022-09-04 PROCEDURE — 2580000003 HC RX 258: Performed by: PHYSICAL MEDICINE & REHABILITATION

## 2022-09-04 PROCEDURE — 1180000000 HC REHAB R&B

## 2022-09-04 PROCEDURE — 94760 N-INVAS EAR/PLS OXIMETRY 1: CPT

## 2022-09-04 PROCEDURE — 82962 GLUCOSE BLOOD TEST: CPT

## 2022-09-04 PROCEDURE — 6360000002 HC RX W HCPCS: Performed by: PHYSICAL MEDICINE & REHABILITATION

## 2022-09-04 PROCEDURE — 6370000000 HC RX 637 (ALT 250 FOR IP): Performed by: PHYSICIAN ASSISTANT

## 2022-09-04 PROCEDURE — 94640 AIRWAY INHALATION TREATMENT: CPT

## 2022-09-04 RX ADMIN — GUAIFENESIN 1200 MG: 600 TABLET ORAL at 22:06

## 2022-09-04 RX ADMIN — ANTI-FUNGAL POWDER MICONAZOLE NITRATE TALC FREE: 1.42 POWDER TOPICAL at 22:13

## 2022-09-04 RX ADMIN — FUROSEMIDE 40 MG: 40 TABLET ORAL at 08:30

## 2022-09-04 RX ADMIN — LATANOPROST 1 DROP: 50 SOLUTION OPHTHALMIC at 22:05

## 2022-09-04 RX ADMIN — PANTOPRAZOLE SODIUM 40 MG: 40 TABLET, DELAYED RELEASE ORAL at 06:32

## 2022-09-04 RX ADMIN — ALBUTEROL SULFATE 2 PUFF: 90 AEROSOL, METERED RESPIRATORY (INHALATION) at 17:30

## 2022-09-04 RX ADMIN — SODIUM CHLORIDE, PRESERVATIVE FREE 10 ML: 5 INJECTION INTRAVENOUS at 08:39

## 2022-09-04 RX ADMIN — CLOPIDOGREL BISULFATE 75 MG: 75 TABLET ORAL at 08:30

## 2022-09-04 RX ADMIN — APIXABAN 5 MG: 5 TABLET, FILM COATED ORAL at 08:30

## 2022-09-04 RX ADMIN — PANTOPRAZOLE SODIUM 40 MG: 40 TABLET, DELAYED RELEASE ORAL at 17:10

## 2022-09-04 RX ADMIN — METFORMIN HYDROCHLORIDE 500 MG: 500 TABLET ORAL at 17:10

## 2022-09-04 RX ADMIN — MONTELUKAST 10 MG: 10 TABLET, FILM COATED ORAL at 22:05

## 2022-09-04 RX ADMIN — GABAPENTIN 300 MG: 300 CAPSULE ORAL at 14:24

## 2022-09-04 RX ADMIN — NAPROXEN 500 MG: 250 TABLET ORAL at 17:10

## 2022-09-04 RX ADMIN — METFORMIN HYDROCHLORIDE 500 MG: 500 TABLET ORAL at 08:30

## 2022-09-04 RX ADMIN — SODIUM CHLORIDE, PRESERVATIVE FREE 10 ML: 5 INJECTION INTRAVENOUS at 22:10

## 2022-09-04 RX ADMIN — SENNOSIDES AND DOCUSATE SODIUM 2 TABLET: 8.6; 5 TABLET ORAL at 08:31

## 2022-09-04 RX ADMIN — ACETAMINOPHEN 650 MG: 325 TABLET ORAL at 22:05

## 2022-09-04 RX ADMIN — GABAPENTIN 300 MG: 300 CAPSULE ORAL at 08:31

## 2022-09-04 RX ADMIN — MOMETASONE FUROATE AND FORMOTEROL FUMARATE DIHYDRATE 2 PUFF: 100; 5 AEROSOL RESPIRATORY (INHALATION) at 05:22

## 2022-09-04 RX ADMIN — NAPROXEN 500 MG: 250 TABLET ORAL at 08:31

## 2022-09-04 RX ADMIN — CARVEDILOL 6.25 MG: 6.25 TABLET, FILM COATED ORAL at 08:30

## 2022-09-04 RX ADMIN — VANCOMYCIN HYDROCHLORIDE 1250 MG: 100 INJECTION, POWDER, LYOPHILIZED, FOR SOLUTION INTRAVENOUS at 10:29

## 2022-09-04 RX ADMIN — INSULIN GLARGINE 14 UNITS: 100 INJECTION, SOLUTION SUBCUTANEOUS at 21:29

## 2022-09-04 RX ADMIN — CARVEDILOL 6.25 MG: 6.25 TABLET, FILM COATED ORAL at 17:10

## 2022-09-04 RX ADMIN — ALBUTEROL SULFATE 2 PUFF: 90 AEROSOL, METERED RESPIRATORY (INHALATION) at 05:22

## 2022-09-04 RX ADMIN — ACETAMINOPHEN 650 MG: 325 TABLET ORAL at 08:31

## 2022-09-04 RX ADMIN — TRAZODONE HYDROCHLORIDE 25 MG: 50 TABLET ORAL at 22:06

## 2022-09-04 RX ADMIN — Medication 6 MG: at 22:06

## 2022-09-04 RX ADMIN — GABAPENTIN 300 MG: 300 CAPSULE ORAL at 22:06

## 2022-09-04 RX ADMIN — GUAIFENESIN 1200 MG: 600 TABLET ORAL at 08:31

## 2022-09-04 RX ADMIN — MOMETASONE FUROATE AND FORMOTEROL FUMARATE DIHYDRATE 2 PUFF: 100; 5 AEROSOL RESPIRATORY (INHALATION) at 17:30

## 2022-09-04 RX ADMIN — CEFTRIAXONE 2000 MG: 2 INJECTION, POWDER, FOR SOLUTION INTRAMUSCULAR; INTRAVENOUS at 16:02

## 2022-09-04 RX ADMIN — CALCITONIN SALMON 1 SPRAY: 200 SPRAY, METERED NASAL at 10:19

## 2022-09-04 RX ADMIN — ACETAMINOPHEN 650 MG: 325 TABLET ORAL at 14:34

## 2022-09-04 RX ADMIN — GLIPIZIDE 5 MG: 5 TABLET ORAL at 08:31

## 2022-09-04 RX ADMIN — ATORVASTATIN CALCIUM 20 MG: 20 TABLET, FILM COATED ORAL at 22:06

## 2022-09-04 RX ADMIN — FERROUS GLUCONATE 324 MG: 324 TABLET ORAL at 08:30

## 2022-09-04 RX ADMIN — AMIODARONE HYDROCHLORIDE 200 MG: 200 TABLET ORAL at 08:31

## 2022-09-04 RX ADMIN — POLYETHYLENE GLYCOL 3350 17 G: 17 POWDER, FOR SOLUTION ORAL at 08:39

## 2022-09-04 RX ADMIN — APIXABAN 5 MG: 5 TABLET, FILM COATED ORAL at 22:05

## 2022-09-04 NOTE — PROGRESS NOTES
VANCO DAILY FOLLOW UP NOTE  [address] Pharmacokinetic Monitoring Service - Vancomycin    Consulting Provider: Dr. Sherryle Meier   Indication: L2-L4 osteodiscitis  Target Concentration: Goal AUC/GIOVANNY 400-600 mg*hr/L  Duration of Therapy: EOT 9/9/22  Additional Antimicrobials: ceftriaxone    Pertinent Laboratory Values: Wt Readings from Last 1 Encounters:   08/26/22 237 lb (107.5 kg)     Temp Readings from Last 1 Encounters:   09/04/22 98.5 °F (36.9 °C) (Axillary)     Recent Labs     09/02/22  0554 09/02/22  0555   BUN 20  --    CREATININE 0.90  --    WBC  --  9.0     Estimated Creatinine Clearance: 87 mL/min (based on SCr of 0.9 mg/dL). Lab Results   Component Value Date/Time    VANCOTROUGH 18.2 08/26/2022 07:30 AM    VANCORANDOM 16.2 09/01/2022 06:30 AM       MRSA Nasal Swab: N/A. Non-respiratory infection. Assessment:  Date/Time Dose Concentration AUC   8/26 0730 1000 mg q12h Tr = 18.2 [facility]   8/28 0006  1000 mg q12h 20.2 570   8/30 0418 750 mg q12h 15.7 402   9/1 0630 1250 mg q18h 16.2 448   Note: Serum concentrations collected for AUC dosing may appear elevated if collected in close proximity to the dose administered, this is not necessarily an indication of toxicity    Plan:  Current dosing regimen is therapeutic   Continue current dose of 1250 mg q18h (projected AUC/Tr of 413/12. 2)  Repeat vancomycin concentrations and renal labs ordered as clinically appropriate  Pharmacy will continue to monitor patient and adjust therapy as indicated    Thank you for the consult,  Scott Banks.  Magnolia Smith, [facility]

## 2022-09-05 LAB
GLUCOSE BLD STRIP.AUTO-MCNC: 101 MG/DL (ref 65–100)
GLUCOSE BLD STRIP.AUTO-MCNC: 134 MG/DL (ref 65–100)
GLUCOSE BLD STRIP.AUTO-MCNC: 160 MG/DL (ref 65–100)
GLUCOSE BLD STRIP.AUTO-MCNC: 191 MG/DL (ref 65–100)
SERVICE CMNT-IMP: ABNORMAL

## 2022-09-05 PROCEDURE — 94640 AIRWAY INHALATION TREATMENT: CPT

## 2022-09-05 PROCEDURE — 97530 THERAPEUTIC ACTIVITIES: CPT

## 2022-09-05 PROCEDURE — 2700000000 HC OXYGEN THERAPY PER DAY

## 2022-09-05 PROCEDURE — 6370000000 HC RX 637 (ALT 250 FOR IP): Performed by: PHYSICIAN ASSISTANT

## 2022-09-05 PROCEDURE — 99232 SBSQ HOSP IP/OBS MODERATE 35: CPT | Performed by: PHYSICAL MEDICINE & REHABILITATION

## 2022-09-05 PROCEDURE — 97542 WHEELCHAIR MNGMENT TRAINING: CPT

## 2022-09-05 PROCEDURE — 2580000003 HC RX 258: Performed by: PHYSICAL MEDICINE & REHABILITATION

## 2022-09-05 PROCEDURE — 97110 THERAPEUTIC EXERCISES: CPT

## 2022-09-05 PROCEDURE — 6370000000 HC RX 637 (ALT 250 FOR IP): Performed by: PHYSICAL MEDICINE & REHABILITATION

## 2022-09-05 PROCEDURE — 6360000002 HC RX W HCPCS: Performed by: PHYSICAL MEDICINE & REHABILITATION

## 2022-09-05 PROCEDURE — 82962 GLUCOSE BLOOD TEST: CPT

## 2022-09-05 PROCEDURE — 94760 N-INVAS EAR/PLS OXIMETRY 1: CPT

## 2022-09-05 PROCEDURE — 97535 SELF CARE MNGMENT TRAINING: CPT

## 2022-09-05 PROCEDURE — 1180000000 HC REHAB R&B

## 2022-09-05 RX ADMIN — GABAPENTIN 300 MG: 300 CAPSULE ORAL at 21:44

## 2022-09-05 RX ADMIN — VANCOMYCIN HYDROCHLORIDE 1250 MG: 100 INJECTION, POWDER, LYOPHILIZED, FOR SOLUTION INTRAVENOUS at 21:43

## 2022-09-05 RX ADMIN — LATANOPROST 1 DROP: 50 SOLUTION OPHTHALMIC at 21:43

## 2022-09-05 RX ADMIN — AMIODARONE HYDROCHLORIDE 200 MG: 200 TABLET ORAL at 08:41

## 2022-09-05 RX ADMIN — GABAPENTIN 300 MG: 300 CAPSULE ORAL at 08:42

## 2022-09-05 RX ADMIN — FLUTICASONE PROPIONATE 1 SPRAY: 50 SPRAY, METERED NASAL at 08:42

## 2022-09-05 RX ADMIN — ANTI-FUNGAL POWDER MICONAZOLE NITRATE TALC FREE: 1.42 POWDER TOPICAL at 08:44

## 2022-09-05 RX ADMIN — SODIUM CHLORIDE, PRESERVATIVE FREE 10 ML: 5 INJECTION INTRAVENOUS at 21:51

## 2022-09-05 RX ADMIN — ACETAMINOPHEN 650 MG: 325 TABLET ORAL at 08:41

## 2022-09-05 RX ADMIN — MOMETASONE FUROATE AND FORMOTEROL FUMARATE DIHYDRATE 2 PUFF: 100; 5 AEROSOL RESPIRATORY (INHALATION) at 05:52

## 2022-09-05 RX ADMIN — ACETAMINOPHEN 650 MG: 325 TABLET ORAL at 15:11

## 2022-09-05 RX ADMIN — INSULIN GLARGINE 14 UNITS: 100 INJECTION, SOLUTION SUBCUTANEOUS at 21:00

## 2022-09-05 RX ADMIN — ALBUTEROL SULFATE 2 PUFF: 90 AEROSOL, METERED RESPIRATORY (INHALATION) at 17:57

## 2022-09-05 RX ADMIN — PANTOPRAZOLE SODIUM 40 MG: 40 TABLET, DELAYED RELEASE ORAL at 06:15

## 2022-09-05 RX ADMIN — APIXABAN 5 MG: 5 TABLET, FILM COATED ORAL at 21:44

## 2022-09-05 RX ADMIN — SENNOSIDES AND DOCUSATE SODIUM 2 TABLET: 8.6; 5 TABLET ORAL at 08:41

## 2022-09-05 RX ADMIN — ANTI-FUNGAL POWDER MICONAZOLE NITRATE TALC FREE: 1.42 POWDER TOPICAL at 21:54

## 2022-09-05 RX ADMIN — SODIUM CHLORIDE, PRESERVATIVE FREE 10 ML: 5 INJECTION INTRAVENOUS at 08:51

## 2022-09-05 RX ADMIN — Medication 6 MG: at 21:43

## 2022-09-05 RX ADMIN — CARVEDILOL 6.25 MG: 6.25 TABLET, FILM COATED ORAL at 08:41

## 2022-09-05 RX ADMIN — VANCOMYCIN HYDROCHLORIDE 1250 MG: 100 INJECTION, POWDER, LYOPHILIZED, FOR SOLUTION INTRAVENOUS at 04:30

## 2022-09-05 RX ADMIN — CEFTRIAXONE 2000 MG: 2 INJECTION, POWDER, FOR SOLUTION INTRAMUSCULAR; INTRAVENOUS at 16:38

## 2022-09-05 RX ADMIN — NAPROXEN 500 MG: 250 TABLET ORAL at 17:02

## 2022-09-05 RX ADMIN — GUAIFENESIN 1200 MG: 600 TABLET ORAL at 08:41

## 2022-09-05 RX ADMIN — MONTELUKAST 10 MG: 10 TABLET, FILM COATED ORAL at 21:44

## 2022-09-05 RX ADMIN — ATORVASTATIN CALCIUM 20 MG: 20 TABLET, FILM COATED ORAL at 21:43

## 2022-09-05 RX ADMIN — METFORMIN HYDROCHLORIDE 500 MG: 500 TABLET ORAL at 17:01

## 2022-09-05 RX ADMIN — ACETAMINOPHEN 650 MG: 325 TABLET ORAL at 21:44

## 2022-09-05 RX ADMIN — GUAIFENESIN 1200 MG: 600 TABLET ORAL at 21:43

## 2022-09-05 RX ADMIN — FERROUS GLUCONATE 324 MG: 324 TABLET ORAL at 08:42

## 2022-09-05 RX ADMIN — TRAZODONE HYDROCHLORIDE 25 MG: 50 TABLET ORAL at 21:44

## 2022-09-05 RX ADMIN — GABAPENTIN 300 MG: 300 CAPSULE ORAL at 15:11

## 2022-09-05 RX ADMIN — CALCITONIN SALMON 1 SPRAY: 200 SPRAY, METERED NASAL at 08:52

## 2022-09-05 RX ADMIN — PANTOPRAZOLE SODIUM 40 MG: 40 TABLET, DELAYED RELEASE ORAL at 16:54

## 2022-09-05 RX ADMIN — NAPROXEN 500 MG: 250 TABLET ORAL at 08:41

## 2022-09-05 RX ADMIN — MOMETASONE FUROATE AND FORMOTEROL FUMARATE DIHYDRATE 2 PUFF: 100; 5 AEROSOL RESPIRATORY (INHALATION) at 17:57

## 2022-09-05 RX ADMIN — CARVEDILOL 6.25 MG: 6.25 TABLET, FILM COATED ORAL at 17:02

## 2022-09-05 RX ADMIN — METFORMIN HYDROCHLORIDE 500 MG: 500 TABLET ORAL at 08:42

## 2022-09-05 RX ADMIN — ALBUTEROL SULFATE 2 PUFF: 90 AEROSOL, METERED RESPIRATORY (INHALATION) at 05:52

## 2022-09-05 RX ADMIN — CLOPIDOGREL BISULFATE 75 MG: 75 TABLET ORAL at 08:42

## 2022-09-05 RX ADMIN — POLYETHYLENE GLYCOL 3350 17 G: 17 POWDER, FOR SOLUTION ORAL at 08:41

## 2022-09-05 RX ADMIN — APIXABAN 5 MG: 5 TABLET, FILM COATED ORAL at 08:41

## 2022-09-05 RX ADMIN — GLIPIZIDE 5 MG: 5 TABLET ORAL at 08:41

## 2022-09-05 RX ADMIN — FUROSEMIDE 40 MG: 40 TABLET ORAL at 08:42

## 2022-09-05 NOTE — PROGRESS NOTES
PHYSICAL THERAPY DAILY NOTE  Time In:  1036  Time Out:  1119  Total Treatment Time:  43 Minutes  Pt. Seen for: AM, Balance Training, Therapeutic Exercise, Transfer Training, Wheelchair mobility, and Other     Subjective: Patient reporting he feels about the same. Reports pain is severe during supine to sit to stand and in standing. Reports his legs feels weak like they might buckle. Reports he hopes the MRI tomorrow will show what is going on in his low back and hips. Patient requesting to go to the bathroom Reports difficulty passing bowlels on bed pan. Objective:  Precautions: Falls    GROSS ASSESSMENT Daily Assessment    RN in to assist with toileting       COGNITION Daily Assessment    Alert, able to follow commands,cooperating,participating, motivated,          BED/MAT MOBILITY Daily Assessment   NT NT       TRANSFERS Daily Assessment   Sit<>stand x 5 from [facility] over commode using grab bar on Right and RW on left. Mod to max assist to complete    Sit<>stand x 1 in II bars with mod to max assist    Patient unable to achieve upright standing due to pain and LE weakness. Dependent assist with clothing management and hygiene during toileting activities Sit to Stand: Mod A to Max A with RW and grab bar  Stand to Sit: Mod A  to Max A with RW and grab bar  Transfer Type: SPT w/c to [facility] over commode with Max assist from PT and min to mod assist from RN. Sliding board transfer [facility] over commode to w/ with mod assist from PT and min assist from RN. Transfer Assistance:  Mod A  Car Transfers: NT         GAIT Daily Assessment    NA       STEPS/STAIRS Daily Assessment           BALANCE Daily Assessment    Static Sitting: Fair:  Pt. requires UE support;  may need occasional min A  Dynamic Sitting: Poor - unable to accept challenge or move without LOB   Static Standing: Poor:  Pt. requires UE support & mod-max A to maintain position  Dynamic Standing: Poor - unable to accept challenge or move without LOB WHEELCHAIR MOBILITY Daily Assessment    Able to Propel (ft): 150ft with supervision to min assist for turning wheelchair  Assistance: Supervision/Standby Assist and Min A  Surface: Level Surface  Wheelchair Set-up: Manages R Brake and Manages L Brake       LOWER EXTREMITY EXERCISES Daily Assessment    LE motomed x 10 mins at level 2     Pain level: 5 to 10 out of 10, pain increases during  sit to stand. Increases with prolonged standing  Pain Location:  low back radiating into iliac crest and down into posterior thigh  Pain Interventions: Medication per order, rest, positioning,relaxation, body mechanics      Vital Signs:  Visit Vitals  BP (!) 141/73   Pulse 73   Temp 97.7 °F (36.5 °C) (Oral)   Resp 22   Ht 6' 1\" (1.854 m)   Wt 237 lb (107.5 kg)   SpO2 95%   BMI 31.27 kg/m²         Education:  Bed mobility training,transfer training, balance training,fall precautions, body mechanics,activity pacing,w/c mobility and parts management, Patient verbalizing understanding and demonstrating partial understanding of patient education. Recommend follow up education. Interdisciplinary Communication: RN in to assist with bathroom transfers and toileting. MD aware of change in status and has ordered MRI scheduled for tomorrow    Patient return to room at end of treatment and remained up in wheelchair with needs in reach wife in room with patient. Assessment: Difficulty with body mechanics during sliding board transfers due to fluctuating sudden onset of pain during transfer training. Unable to progress to gait training or SPT with RW due to pain level. Only able to tolerate 10 to 20 seconds of standing before radiating pain causes knees to buckle. Progress remains limited at this time due to pain. Plan of Care: Continue with POC and progress as tolerated.      Saulo Olvera, PT  9/5/2022

## 2022-09-05 NOTE — PROGRESS NOTES
[address]  Admit Date: 8/26/2022  Admit Diagnosis:   Physical debility [R53.81]    Subjective     Patient seen and examined. Told pt that we are getting a lumbar MRI tomorrow due to his severe radiating LBP/ need to see extent of clearance of OM and abscess. No fever but pain is greatly affecting his mobility and progress. Still has neuropathic pain around lower pelvis. Compliant with his CPAP at [facility]. Mod/max assist for STS, non ambulatory.  No progress  Objective:     Current Facility-Administered Medications   Medication Dose Route Frequency    gabapentin (NEURONTIN) capsule 300 mg  300 mg Oral TID    traMADol (ULTRAM) tablet 50 mg  50 mg Oral Q6H PRN    glipiZIDE (GLUCOTROL) tablet 5 mg  5 mg Oral QAM AC    ferrous gluconate (FERGON) tablet 324 mg  324 mg Oral Daily with breakfast    sennosides-docusate sodium (SENOKOT-S) 8.6-50 MG tablet 2 tablet  2 tablet Oral Daily    vancomycin (VANCOCIN) 1250 mg in sodium chloride 0.9% 250 mL IVPB  1,250 mg IntraVENous Q18H    carvedilol (COREG) tablet 6.25 mg  6.25 mg Oral BID WC    furosemide (LASIX) tablet 40 mg  40 mg Oral Daily    metFORMIN (GLUCOPHAGE) tablet 500 mg  500 mg Oral BID WC    oxyCODONE (ROXICODONE) immediate release tablet 5 mg  5 mg Oral Q4H PRN    ondansetron (ZOFRAN-ODT) disintegrating tablet 4 mg  4 mg Oral Q6H PRN    traZODone (DESYREL) tablet 25 mg  25 mg Oral Nightly    albuterol sulfate HFA (PROVENTIL;VENTOLIN;PROAIR) 108 (90 Base) MCG/ACT inhaler 2 puff  2 puff Inhalation BID    albuterol sulfate HFA (PROVENTIL;VENTOLIN;PROAIR) 108 (90 Base) MCG/ACT inhaler 2 puff  2 puff Inhalation Q6H PRN    sodium chloride flush 0.9 % injection 10 mL  10 mL IntraVENous BID    miconazole (MICOTIN) 2 % powder   Topical BID    acetaminophen (TYLENOL) tablet 650 mg  650 mg Oral TID    amiodarone (CORDARONE) tablet 200 mg  200 mg Oral Daily    apixaban (ELIQUIS) tablet 5 mg  5 mg Oral BID    atorvastatin (LIPITOR) tablet 20 mg  20 mg Oral Nightly    bisacodyl (DULCOLAX) suppository 10 mg  10 mg Rectal Daily PRN    calcitonin (MIACALCIN) nasal spray 1 spray  1 spray Alternating Nares Daily    clopidogrel (PLAVIX) tablet 75 mg  75 mg Oral Daily    fluticasone (FLONASE) 50 MCG/ACT nasal spray 1 spray  1 spray Each Nostril Daily    guaiFENesin (MUCINEX) extended release tablet 1,200 mg  1,200 mg Oral BID    insulin glargine (LANTUS) injection vial 14 Units  14 Units SubCUTAneous Nightly    insulin lispro (HUMALOG) injection vial 0-16 Units  0-16 Units SubCUTAneous TID WC    insulin lispro (HUMALOG) injection vial 0-4 Units  0-4 Units SubCUTAneous Nightly    latanoprost (XALATAN) 0.005 % ophthalmic solution 1 drop  1 drop Both Eyes Nightly    lidocaine 4 % external patch 1 patch  1 patch TransDERmal Daily    [Held by provider] losartan (COZAAR) tablet 100 mg  100 mg Oral Daily    melatonin tablet 6 mg  6 mg Oral Nightly    montelukast (SINGULAIR) tablet 10 mg  10 mg Oral Nightly    naproxen (NAPROSYN) tablet 500 mg  500 mg Oral BID WC    pantoprazole (PROTONIX) tablet 40 mg  40 mg Oral BID AC    polyethylene glycol (GLYCOLAX) packet 17 g  17 g Oral Daily    glucose chewable tablet 16 g  4 tablet Oral PRN    dextrose bolus 10% 125 mL  125 mL IntraVENous PRN    Or    dextrose bolus 10% 250 mL  250 mL IntraVENous PRN    glucagon (rDNA) injection 1 mg  1 mg SubCUTAneous PRN    dextrose 10 % infusion   IntraVENous Continuous PRN    mometasone-formoterol (DULERA) 100-5 MCG/ACT inhaler 2 puff  2 puff Inhalation BID    cefTRIAXone (ROCEPHIN) 2,000 mg in sodium chloride 0.9 % 50 mL IVPB mini-bag  2,000 mg IntraVENous Q24H        Review of Systems:   Denies chest pain, shortness of breath, cough, headache, visual problems, abdominal pain, dysuria, calf pain. Pertinent positives are as noted in the HPI, ROS unremarkable otherwise.      Visit Vitals  BP (!) 141/73   Pulse 73   Temp 97.7 °F (36.5 °C) (Oral)   Resp 22   Ht 6' 1\" (1.854 m)   Wt 237 lb (107.5 kg)   SpO2 95%   BMI 31.27 kg/m²        Physical Exam:   General: Alert and age appropriately oriented. No acute cardiorespiratory distress. HEENT: Normocephalic, EOM intact. Oral mucosa moist without cyanosis. Lungs: Clear to auscultation bilaterally. Respiration even and unlabored. Heart: Regular rate and rhythm, S1, S2. No murmurs, clicks, rub or gallops. Abdomen: Soft, non-tender, not distended. Bowel sounds normoactive. No organomegaly. obese   Genitourinary: Deferred. Neuromuscular:      LE strength at HF 3-/5 (R), 3+/5 (L), KE/KF 4+/5, DF/PF 5/  No sensory deficits distally. Exam limited by pain. Skin/extremity: No rashes, no erythema. No calf tenderness B LE. No edema. Back inc well healed  Distal pulses 2+                                                                              Riley Fall Risk Assessment:  History of Falling: Yes     Swallow Assessment:  Swallow Screening  Is the patient unable to remain alert for testing?: No  Is the patient on a modified diet (thickened liquids) due to pre-existing dysphagia?: No  Is there presence of existing enteral tube feeding via the stomach or nose?: No  Is there presence of head-of-bed restrictions (less than 30 degrees)?: No  Is there presence of tracheotomy tube?: No  Is the patient ordered nothing-by-mouth status?: No  3 oz Water Swallow Screen: Pass      Labs/Studies:  Recent Results (from the past 72 hour(s))   POCT Glucose    Collection Time: 09/02/22  4:17 PM   Result Value Ref Range    POC Glucose 87 65 - 100 mg/dL    Performed by: Lanny    POCT Glucose    Collection Time: 09/02/22  8:47 PM   Result Value Ref Range    POC Glucose 111 (H) 65 - 100 mg/dL    Performed by:  Tamiko    POCT Glucose    Collection Time: 09/03/22  6:37 AM   Result Value Ref Range    POC Glucose 109 (H) 65 - 100 mg/dL    Performed by: Carlos Brown    POCT Glucose    Collection Time: 09/03/22 11:08 AM   Result Value ~10R39, patient able to ambulate with PT   -8/31 increase gabapentin to 200mg TID; patient with initial good response to gabapentin  -9/1 reminded patient he has oxycodone IR for LS pain, has used only 2 doses since 8/29; he admits to aversion to narcotics, concerned about addiction  -9/2 doing well on gabapentin 200mg TID  -9/3 pain is quite debilitating and could not stand or attempt to walk. He must take the narcotics. Will add tramadol. Will inc neurontin to 300 mg tid. Needs MRI of lumbar spine; no f/u MRI post kyphoplasty with known central canal stenosis  -9//5 pain remains the limiting factpr. Tolerating inc dose of Gabapentin. Due to Holiday, the MRI ordered Sunday cannot be performed until tomorrow 9/6      Diabetes mellitus - HgbA1c 6.0% (8/10/2022), was 5.5% in May prior to kyphoplasty. Continue Lantus 14u at bedtime, Humalog SSI. Adjust as needed. Per EMR, prior to admission he was on metformin 500mg BID, Januvia 100mg daily, glipizide XL 10mg daily. -8/28  this AM, yesterday 127, 209, 158, 151  -8/29  this AM; ranged 125-195 yesterday  -8/30  this AM, ranged 138-280 yesterday; resume home dose metformin 500mg BID (Cr / BUN normal 8/28)  -8/31  this AM, ranged 140-244 yesterday; resume glipizide (not XL) tomorrow but at 2.5mg strength  -9/1  this AM; observe today's values for effect from glipizide started this AM  -9/2  this AM, ranged 109-187 (better) yesterday after 1st dose glipizide 2.5mg; room for improvement, will increase to 5mg starting tomorrow  -9/3  this am, 111 at bedtime, 87 at supper, 152 at lunch and 136 yesterday a.m.  Lunch today 193; increasing glipizide to 5mg daily cont Metformin 500mg bid and Lantus 14u at bedtime  -9/5 BS this am 160, last bedtime 131, supper 164, lunch 154 cont current schedule     Electrolyte abnormality - hypokalemia, K+ 3.4 (8/25); replace and monitor.   -8/28 K+ 3.7  -9/2 normal BMP this AM; recheck 9/6 Hyperlipidemia - continue daily Lipitor 20mg. Cardiomyopathy, CAD, history of Vtach - s/p ICD; compensated. Continue Plavix 75mg daily. Atrial fibrillation - continue amiodarone 200mg daily, Eliquis 5mg BID. Anemia - Hgb 8.8 on 8/22. History of normocytic anemia going back ~8y in EMR. Monitor.  -8/28 Hgb 7.9, recheck CBC in AM  -8/29 Hgb 7.8, drifting downward; consider PO iron  -9/2 Hgb 8.3, iron studies with low iron / TIBC / transferrin; add PO iron with breakfast  9/5 recheck in am     COPD - continue Dulera, Singulair and Mucinex; well compensated. 8/30, coarse breath sounds, upper airway wheeze - CXR with early pulmonary edema. Add one-time dose bumetanide 2mg now and continue daily furosemide 40mg.  -9/1 clear breath sounds, no pedal edema  -9/2 increased O2 needs with CPAP overnight, rhonchi: repeat CXR this AM; 9/3 xray with no change. Down to using 2L O2.   -9/5 on RA during the day. 2L bled in to CPAP at [facility]     Pneumonia prophylaxis - incentive spirometer every hour while awake. DVT risk / DVT prophylaxis - daily physician / PA exam to assess as patient is at increased risk for of thromboembolism. Mobilize as tolerated. Sequential pneumatic compression devices (SCDs) when in bed; thigh-high or knee-high thromboembolic deterrent hose when out of bed. On Eliquis. GI prophylaxis - resume PPI. At times may need additional antacids, Maalox prn. History of tobacco use - stress abstinence, education. Reportedly quit 15y ago but continues with chewing tobacco.     General skin care / wound prevention - monitor general skin wound status daily per staff and physician / PA. At risk for failure due to impaired mobility. Bladder program / urinary retention / neurogenic bladder - schedule voids q6-8h. Check post-void residual as needed; in-and-out catheter if post-void residual is more than 400ml.      Bowel program - at risk for constipation as a side effect of opioids, other medications, impaired mobility, etc. MiraLAX daily for regularity, Joanna-Colace for stool softener. PRN MOM, bisacodyl suppository or tablets for constipation. -8/30 c/o constipation, normally uses Fleets enema at home but declines that here; no rectal pressure, just mid-belly fullness; bisacodyl 10mg PO now  -8/31 no BM with bisacodyl PO, pharmacy does not have mag citrate; MOM 30ml now  -9/1 large BM x 2 after MOM yesterday  -9/3 states his bowels have improved     Sleep disruption, 8/29 - no relief from scheduled Vistaril 25mg at bedtime. Does not require medications for sleep at home. Start low-dose trazodone cautiously, as patient had AMS in [place] attributed to polypharmacy. -8/30 slept well on trazodone, denies hangover effect this AM  -8/31 sleeping well, continue trazodone                     Time spent was 25 minutes with over 1/2 in direct patient care/examination, consultation and coordination of care.      Signed By: Simona Brice MD     September 5, 2022

## 2022-09-05 NOTE — PROGRESS NOTES
OT DAILY NOTE    Time In: 759  Time Out: 822     Score Comments   Rolling Dependent No initation   Supine to Sit Dependent No initation   Sit to Stand Dependent Ax2   Transfer Assist Dependent Ax2     Bathing Current Functional Level   Score Dependent   Comments Ax2     Upper Body   Dressing Current Functional Level   Score Partial/moderate assistance   Comments A to pull down in back; pt refused to attempt to remove shirt     Lower Body   Dressing Current Functional Level     Functional Level Dependent   Comments Ax2     Donning/[place]  Footwear Current Functional Level     Functional Level Dependent   Comments A for all parts     Summary of Session: Pt was in bed and agreeable to tx with max encouragement. Pt's performance with ADL is reflected in above chart. Pt had little initiation today and reported increased pain, but was less reactive to pain compared to what had been seen in the past. Pt appeared ''done\" and had low engagement with the tx. Pt is going backwards and is worse than he was on eval. Pt will not be safe to return home with current support system.      Feliberto Ellington, OT  9/5/2022

## 2022-09-05 NOTE — PROGRESS NOTES
PHYSICAL THERAPY DAILY NOTE  Time In:  5340  Time Out:  1436  Total Treatment Time:  48 Minutes  Pt. Seen for: AM, Balance Training, Therapeutic Exercise, Transfer Training, Wheelchair mobility, and Other     Subjective:Patient reporting no pain in his hips. Reports the pain is across his low back and into his hips along his waist band. Reports history of bulging disc in his low back. Reports pain is severe during supine to sit and sit to stand         Objective:  Precautions: Falls    GROSS ASSESSMENT Daily Assessment           COGNITION Daily Assessment    Alert, able to follow commands,cooperating,participating, motivated,          BED/MAT MOBILITY Daily Assessment   Increased time and effort to complete with cues for body mechanics  All bed mobility on mat with no rails or HOB elevation Mod assist with rolling left and right and max assist with supine to sidelying to sit.  Mod assist with sit to sidelying to supine       TRANSFERS Daily Assessment   Increased time and effort to complete with cues for body mechanics   Mod to max assist with sliding board transfer w/c<>mat level surfaces       GAIT Daily Assessment    NA       STEPS/STAIRS Daily Assessment    NA       BALANCE Daily Assessment    Static Sitting: Fair:  Pt. requires UE support;  may need occasional min A  Dynamic Sitting: Poor - unable to accept challenge or move without LOB   Static Standing: Poor:  Pt. requires UE support & mod-max A to maintain position  Dynamic Standing: Poor - unable to accept challenge or move without LOB        WHEELCHAIR MOBILITY Daily Assessment    Able to Propel (ft): 150ft with supervision to min assist for turning wheelchair  Assistance: Supervision/Standby Assist and Min A  Surface: Level Surface  Wheelchair Set-up: Manages R Brake and Manages L Brake       LOWER EXTREMITY EXERCISES Daily Assessment    LE motomed x 10 mins at level 2     Pain level: 5 to 10 out of 10, pain increases during supine to sit   Pain Location: low back radiating into iliac crest and down into posterior thigh  Pain Interventions: Medication per order, rest, positioning,relaxation, body mechanics      Vital Signs:  Visit Vitals  /71   Pulse 62   Temp 98.1 °F (36.7 °C) (Oral)   Resp 20   Ht 6' 1\" (1.854 m)   Wt 237 lb (107.5 kg)   SpO2 97%   BMI 31.27 kg/m²         Education:  Bed mobility training,transfer training, balance training,fall precautions, body mechanics,activity pacing,w/c mobility and parts management, Patient verbalizing understanding and demonstrating partial understanding of patient education. Recommend follow up education. Interdisciplinary Communication: NA    Patient return to room at end of treatment and remained up in wheelchair with needs in reach wife in room with patient. Assessment: Difficulty with body mechanics during sliding board transfers due to fluctuating sudden onset of pain during transfer training. Difficulty progressing functional status due to pain level. Plan of Care: Continue with POC and progress as tolerated.      James Morris, PT  9/5/2022

## 2022-09-06 ENCOUNTER — APPOINTMENT (OUTPATIENT)
Dept: MRI IMAGING | Age: 77
DRG: 094 | End: 2022-09-06
Attending: PHYSICAL MEDICINE & REHABILITATION
Payer: MEDICARE

## 2022-09-06 LAB
ALBUMIN SERPL-MCNC: 2.4 G/DL (ref 3.2–4.6)
ALBUMIN/GLOB SERPL: 0.7 {RATIO} (ref 1.2–3.5)
ALP SERPL-CCNC: 91 U/L (ref 50–136)
ALT SERPL-CCNC: 45 U/L (ref 12–65)
ANION GAP SERPL CALC-SCNC: 5 MMOL/L (ref 4–13)
AST SERPL-CCNC: 24 U/L (ref 15–37)
BILIRUB SERPL-MCNC: 0.4 MG/DL (ref 0.2–1.1)
BUN SERPL-MCNC: 15 MG/DL (ref 8–23)
CALCIUM SERPL-MCNC: 8.4 MG/DL (ref 8.3–10.4)
CHLORIDE SERPL-SCNC: 106 MMOL/L (ref 101–110)
CO2 SERPL-SCNC: 31 MMOL/L (ref 21–32)
CREAT SERPL-MCNC: 0.8 MG/DL (ref 0.8–1.5)
ERYTHROCYTE [DISTWIDTH] IN BLOOD BY AUTOMATED COUNT: 16.7 % (ref 11.9–14.6)
GLOBULIN SER CALC-MCNC: 3.6 G/DL (ref 2.3–3.5)
GLUCOSE BLD STRIP.AUTO-MCNC: 103 MG/DL (ref 65–100)
GLUCOSE BLD STRIP.AUTO-MCNC: 107 MG/DL (ref 65–100)
GLUCOSE BLD STRIP.AUTO-MCNC: 113 MG/DL (ref 65–100)
GLUCOSE SERPL-MCNC: 93 MG/DL (ref 65–100)
HCT VFR BLD AUTO: 26.5 % (ref 41.1–50.3)
HGB BLD-MCNC: 8.4 G/DL (ref 13.6–17.2)
MCH RBC QN AUTO: 30.7 PG (ref 26.1–32.9)
MCHC RBC AUTO-ENTMCNC: 31.7 G/DL (ref 31.4–35)
MCV RBC AUTO: 96.7 FL (ref 79.6–97.8)
NRBC # BLD: 0 K/UL (ref 0–0.2)
PLATELET # BLD AUTO: 333 K/UL (ref 150–450)
PMV BLD AUTO: 10.4 FL (ref 9.4–12.3)
POTASSIUM SERPL-SCNC: 3.7 MMOL/L (ref 3.5–5.1)
PROT SERPL-MCNC: 6 G/DL (ref 6.3–8.2)
RBC # BLD AUTO: 2.74 M/UL (ref 4.23–5.6)
SERVICE CMNT-IMP: ABNORMAL
SODIUM SERPL-SCNC: 142 MMOL/L (ref 136–145)
WBC # BLD AUTO: 7.1 K/UL (ref 4.3–11.1)

## 2022-09-06 PROCEDURE — 97530 THERAPEUTIC ACTIVITIES: CPT

## 2022-09-06 PROCEDURE — 6360000002 HC RX W HCPCS: Performed by: PHYSICAL MEDICINE & REHABILITATION

## 2022-09-06 PROCEDURE — 6370000000 HC RX 637 (ALT 250 FOR IP): Performed by: PHYSICAL MEDICINE & REHABILITATION

## 2022-09-06 PROCEDURE — 99232 SBSQ HOSP IP/OBS MODERATE 35: CPT | Performed by: PHYSICAL MEDICINE & REHABILITATION

## 2022-09-06 PROCEDURE — 2580000003 HC RX 258: Performed by: PHYSICAL MEDICINE & REHABILITATION

## 2022-09-06 PROCEDURE — 82962 GLUCOSE BLOOD TEST: CPT

## 2022-09-06 PROCEDURE — 94760 N-INVAS EAR/PLS OXIMETRY 1: CPT

## 2022-09-06 PROCEDURE — 72158 MRI LUMBAR SPINE W/O & W/DYE: CPT

## 2022-09-06 PROCEDURE — 6360000004 HC RX CONTRAST MEDICATION: Performed by: PHYSICAL MEDICINE & REHABILITATION

## 2022-09-06 PROCEDURE — 97110 THERAPEUTIC EXERCISES: CPT

## 2022-09-06 PROCEDURE — 1180000000 HC REHAB R&B

## 2022-09-06 PROCEDURE — 80053 COMPREHEN METABOLIC PANEL: CPT

## 2022-09-06 PROCEDURE — 6370000000 HC RX 637 (ALT 250 FOR IP): Performed by: PHYSICIAN ASSISTANT

## 2022-09-06 PROCEDURE — 92507 TX SP LANG VOICE COMM INDIV: CPT

## 2022-09-06 PROCEDURE — 97535 SELF CARE MNGMENT TRAINING: CPT

## 2022-09-06 PROCEDURE — 94640 AIRWAY INHALATION TREATMENT: CPT

## 2022-09-06 PROCEDURE — A9579 GAD-BASE MR CONTRAST NOS,1ML: HCPCS | Performed by: PHYSICAL MEDICINE & REHABILITATION

## 2022-09-06 PROCEDURE — 36415 COLL VENOUS BLD VENIPUNCTURE: CPT

## 2022-09-06 PROCEDURE — 85027 COMPLETE CBC AUTOMATED: CPT

## 2022-09-06 RX ORDER — SODIUM CHLORIDE 0.9 % (FLUSH) 0.9 %
10 SYRINGE (ML) INJECTION AS NEEDED
Status: DISCONTINUED | OUTPATIENT
Start: 2022-09-06 | End: 2022-09-07 | Stop reason: HOSPADM

## 2022-09-06 RX ADMIN — LATANOPROST 1 DROP: 50 SOLUTION OPHTHALMIC at 20:58

## 2022-09-06 RX ADMIN — SODIUM CHLORIDE, PRESERVATIVE FREE 10 ML: 5 INJECTION INTRAVENOUS at 23:16

## 2022-09-06 RX ADMIN — ATORVASTATIN CALCIUM 20 MG: 20 TABLET, FILM COATED ORAL at 20:50

## 2022-09-06 RX ADMIN — MOMETASONE FUROATE AND FORMOTEROL FUMARATE DIHYDRATE 2 PUFF: 100; 5 AEROSOL RESPIRATORY (INHALATION) at 17:54

## 2022-09-06 RX ADMIN — TRAZODONE HYDROCHLORIDE 25 MG: 50 TABLET ORAL at 20:49

## 2022-09-06 RX ADMIN — GUAIFENESIN 1200 MG: 600 TABLET ORAL at 08:52

## 2022-09-06 RX ADMIN — MONTELUKAST 10 MG: 10 TABLET, FILM COATED ORAL at 20:50

## 2022-09-06 RX ADMIN — ACETAMINOPHEN 650 MG: 325 TABLET ORAL at 08:52

## 2022-09-06 RX ADMIN — FUROSEMIDE 40 MG: 40 TABLET ORAL at 08:53

## 2022-09-06 RX ADMIN — AMIODARONE HYDROCHLORIDE 200 MG: 200 TABLET ORAL at 08:52

## 2022-09-06 RX ADMIN — ALBUTEROL SULFATE 2 PUFF: 90 AEROSOL, METERED RESPIRATORY (INHALATION) at 10:00

## 2022-09-06 RX ADMIN — CEFTRIAXONE 2000 MG: 2 INJECTION, POWDER, FOR SOLUTION INTRAMUSCULAR; INTRAVENOUS at 16:26

## 2022-09-06 RX ADMIN — OXYCODONE 5 MG: 5 TABLET ORAL at 12:07

## 2022-09-06 RX ADMIN — Medication 6 MG: at 20:50

## 2022-09-06 RX ADMIN — VANCOMYCIN HYDROCHLORIDE 1250 MG: 100 INJECTION, POWDER, LYOPHILIZED, FOR SOLUTION INTRAVENOUS at 17:00

## 2022-09-06 RX ADMIN — GLIPIZIDE 5 MG: 5 TABLET ORAL at 08:52

## 2022-09-06 RX ADMIN — GUAIFENESIN 1200 MG: 600 TABLET ORAL at 20:49

## 2022-09-06 RX ADMIN — FLUTICASONE PROPIONATE 1 SPRAY: 50 SPRAY, METERED NASAL at 08:56

## 2022-09-06 RX ADMIN — CALCITONIN SALMON 1 SPRAY: 200 SPRAY, METERED NASAL at 08:53

## 2022-09-06 RX ADMIN — PANTOPRAZOLE SODIUM 40 MG: 40 TABLET, DELAYED RELEASE ORAL at 16:25

## 2022-09-06 RX ADMIN — SODIUM CHLORIDE, PRESERVATIVE FREE 10 ML: 5 INJECTION INTRAVENOUS at 08:54

## 2022-09-06 RX ADMIN — GABAPENTIN 300 MG: 300 CAPSULE ORAL at 20:50

## 2022-09-06 RX ADMIN — SENNOSIDES AND DOCUSATE SODIUM 2 TABLET: 8.6; 5 TABLET ORAL at 08:52

## 2022-09-06 RX ADMIN — FERROUS GLUCONATE 324 MG: 324 TABLET ORAL at 08:52

## 2022-09-06 RX ADMIN — APIXABAN 5 MG: 5 TABLET, FILM COATED ORAL at 20:50

## 2022-09-06 RX ADMIN — INSULIN GLARGINE 14 UNITS: 100 INJECTION, SOLUTION SUBCUTANEOUS at 20:56

## 2022-09-06 RX ADMIN — MOMETASONE FUROATE AND FORMOTEROL FUMARATE DIHYDRATE 2 PUFF: 100; 5 AEROSOL RESPIRATORY (INHALATION) at 05:58

## 2022-09-06 RX ADMIN — APIXABAN 5 MG: 5 TABLET, FILM COATED ORAL at 08:52

## 2022-09-06 RX ADMIN — METFORMIN HYDROCHLORIDE 500 MG: 500 TABLET ORAL at 16:25

## 2022-09-06 RX ADMIN — ALBUTEROL SULFATE 2 PUFF: 90 AEROSOL, METERED RESPIRATORY (INHALATION) at 05:58

## 2022-09-06 RX ADMIN — GABAPENTIN 300 MG: 300 CAPSULE ORAL at 08:52

## 2022-09-06 RX ADMIN — ACETAMINOPHEN 650 MG: 325 TABLET ORAL at 20:48

## 2022-09-06 RX ADMIN — METFORMIN HYDROCHLORIDE 500 MG: 500 TABLET ORAL at 08:52

## 2022-09-06 RX ADMIN — CLOPIDOGREL BISULFATE 75 MG: 75 TABLET ORAL at 08:52

## 2022-09-06 RX ADMIN — NAPROXEN 500 MG: 250 TABLET ORAL at 08:52

## 2022-09-06 RX ADMIN — CARVEDILOL 6.25 MG: 6.25 TABLET, FILM COATED ORAL at 16:26

## 2022-09-06 RX ADMIN — NAPROXEN 500 MG: 250 TABLET ORAL at 16:26

## 2022-09-06 RX ADMIN — ACETAMINOPHEN 650 MG: 325 TABLET ORAL at 14:23

## 2022-09-06 RX ADMIN — CARVEDILOL 6.25 MG: 6.25 TABLET, FILM COATED ORAL at 08:52

## 2022-09-06 RX ADMIN — ANTI-FUNGAL POWDER MICONAZOLE NITRATE TALC FREE: 1.42 POWDER TOPICAL at 08:56

## 2022-09-06 RX ADMIN — PANTOPRAZOLE SODIUM 40 MG: 40 TABLET, DELAYED RELEASE ORAL at 06:33

## 2022-09-06 RX ADMIN — GADOTERIDOL 20 ML: 279.3 INJECTION, SOLUTION INTRAVENOUS at 13:21

## 2022-09-06 RX ADMIN — ALBUTEROL SULFATE 2 PUFF: 90 AEROSOL, METERED RESPIRATORY (INHALATION) at 17:54

## 2022-09-06 RX ADMIN — GABAPENTIN 300 MG: 300 CAPSULE ORAL at 14:24

## 2022-09-06 ASSESSMENT — PAIN DESCRIPTION - DESCRIPTORS
DESCRIPTORS: ACHING
DESCRIPTORS: ACHING;THROBBING;SPASM;SHARP

## 2022-09-06 ASSESSMENT — PAIN SCALES - GENERAL
PAINLEVEL_OUTOF10: 2
PAINLEVEL_OUTOF10: 1
PAINLEVEL_OUTOF10: 7

## 2022-09-06 ASSESSMENT — PAIN DESCRIPTION - ORIENTATION
ORIENTATION: LOWER
ORIENTATION: LOWER

## 2022-09-06 ASSESSMENT — PAIN - FUNCTIONAL ASSESSMENT: PAIN_FUNCTIONAL_ASSESSMENT: ACTIVITIES ARE NOT PREVENTED

## 2022-09-06 ASSESSMENT — PAIN DESCRIPTION - LOCATION
LOCATION: BACK
LOCATION: BACK

## 2022-09-06 ASSESSMENT — PAIN DESCRIPTION - ONSET: ONSET: ON-GOING

## 2022-09-06 ASSESSMENT — PAIN DESCRIPTION - FREQUENCY: FREQUENCY: CONTINUOUS

## 2022-09-06 NOTE — PROGRESS NOTES
Physical Therapy  PHYSICAL THERAPY DAILY NOTE  Time In:  1304 PM  Time Out:  1521 PM  Total Treatment Time:  (P) 49 Minutes  Pt. Seen for: PM, Therapeutic Exercise, and Transfer Training     Subjective: \" My low back just hurts every time I move. \"         Objective:  Precautions: Poor Safety Awareness    GROSS ASSESSMENT Daily Assessment           COGNITION Daily Assessment    impaired       BED/MAT MOBILITY Daily Assessment    Rolling Right: Max A  Rolling Left: Max A  Supine to Sit: Max A  Sit to Supine: Max A       TRANSFERS Daily Assessment    Sit to Stand: Max A  Stand to Sit: Max A  Transfer Type: Lateral Pivot  Transfer Assistance: Max A  Car Transfers: NT         GAIT Daily Assessment    Amount of Assistance:   Distance (ft):   Assistive Device:   Surface:        STEPS/STAIRS Daily Assessment    Steps Ambulated:    Level of Assistance:    Railing:  Assistive Device:        BALANCE Daily Assessment    Static Sitting:   Dynamic Sitting:   Static Standing:   Dynamic Standing:        WHEELCHAIR MOBILITY Daily Assessment    Able to Propel (ft):   Assistance:   Surface:   Wheelchair Set-up:        LOWER EXTREMITY EXERCISES Daily Assessment    Rode motomed x 10 minutes. .    SEATED EXERCISES Sets Reps Comments   Ankle Pumps 1 10    Hip Flexion 1 10    Long Arc Quads 1 10    Hip Adduction/Ball Squeeze 1 10                             Pain level: Pain not rated but was unable to stand with assistance secondary to pain. Pain is described as a muscle spasm and you can tell he is in pain and clinching his whole body when this occurs. It looks like he is having muscle spasm.   Pain Location:  low back  Pain Interventions:     Vital Signs:  BP (!) 149/70   Pulse 69   Temp 98 °F (36.7 °C) (Oral)   Resp 16   Ht 6' 1\" (1.854 m)   Wt 237 lb (107.5 kg)   SpO2 93%   BMI 31.27 kg/m²       Education:      Interdisciplinary Communication: Discussed with nurse Maikol Lea concerning to have 2 people with transfer back to bed. .    Pt. Left in wheelchair where patient requested with call bell at reach. Assessment: Patient varies on how much assistance he requires but pain has increased . Plan of Care: Continue with plan of care.     Jeffery Guzman, PTA  9/6/2022

## 2022-09-06 NOTE — PROGRESS NOTES
Physical Therapy  PHYSICAL THERAPY DAILY NOTE  Time In:  410 AM  Time Out:  959 AM  Total Treatment Time:  (P) 40 Minutes  Pt. Seen for: AM, Therapeutic Exercise, and Transfer Training     Subjective: \" I am in pain and I dont want to get up. \"         Objective:  Precautions: Poor Safety Awareness and Impulsive     GROSS ASSESSMENT Daily Assessment           COGNITION Daily Assessment           BED/MAT MOBILITY Daily Assessment    Rolling Right: Max A  Rolling Left: Max A  Supine to Sit: Max A  Sit to Supine: Max A       TRANSFERS Daily Assessment    Sit to Stand: Max A  Stand to Sit: Max A  Transfer Type: Stand Pivot  Transfer Assistance: Total A  Car Transfers: NT         GAIT Daily Assessment   Did not attempt gait secondary to pain and spasms in back. Amount of Assistance:   Distance (ft):   Assistive Device:   Surface:        STEPS/STAIRS Daily Assessment    Steps Ambulated:    Level of Assistance:    Railing:  Assistive Device:        BALANCE Daily Assessment    Static Sitting:   Dynamic Sitting:   Static Standing:   Dynamic Standing:        WHEELCHAIR MOBILITY Daily Assessment    Able to Propel (ft):   Assistance:   Surface:   Wheelchair Set-up:        LOWER EXTREMITY EXERCISES Daily Assessment    Assisted with ADL with OT . Patient required total assist to stand and transfer. Pain level: pain not rated. Pain Location:  low back  Pain Interventions:     Vital Signs:  BP (!) 149/70   Pulse 69   Temp 98 °F (36.7 °C) (Oral)   Resp 16   Ht 6' 1\" (1.854 m)   Wt 237 lb (107.5 kg)   SpO2 91%   BMI 31.27 kg/m²       Education:      Interdisciplinary Communication: Discussed with nurse Harish Cramer to call for therapy to help with transfer back to bed . He is a total assist transfer. His assistance level varies from session to session. Pt. Left in wheelchair awaiting speech therapy with wife present. Assessment: Patient seemed very frustrated from back spasms.          Plan of Care: Continue with plan of care.     Di Strnage, PTA  9/6/2022

## 2022-09-06 NOTE — PROGRESS NOTES
OT DAILY NOTE    Time In: 920   Time Out: 959     Score Comments   Rolling Substantial/maximal assistance Significant A   Supine to Sit Substantial/maximal assistance Significant A   Sit to Supine Dependent Ax2   Sit to Stand Dependent Ax2 for safety   Transfer Assist Dependent Ax2 for safety       Eating Current Functional Level   Score     Comments       Oral Hygiene  Current Functional Level   Score Supervision or touching assistance   Comments Cueing     Bathing Current Functional Level   Score Dependent   Comments Ax2 to wash buttocks     Upper Body   Dressing Current Functional Level   Score Partial/moderate assistance   Comments A to pull down in the back     Lower Body   Dressing Current Functional Level     Functional Level Dependent   Comments Ax2     Donning/[place]  Footwear Current Functional Level     Functional Level Dependent   Comments A for all parts     Summary of Session: Pt was in bed and agreeable to tx. Pt's performance with ADL is reflected in above chart. Pt did better than yesterday, but still worse than on eval. Attempted some core strengthening exercises with poor performance. Pt is having more spasms. Pt was left in w/c with all needs within reach.      Gonzalo Avelar OT  9/6/2022

## 2022-09-06 NOTE — CARE COORDINATION
Chart reviewed. Will follow up on DME needs for w/c. Will follow up after family [facility]. CM to continue to follow and monitor for any further needs.

## 2022-09-06 NOTE — PROGRESS NOTES
GOALS:   STG: Patient will complete functional verbal/visual reasoning activities with 80% accuracy with minimal assistance  STG: Patient will complete calculations with 80% accuracy with minimal assistance. STG: Patient will complete functional attention tasks with 80% accuracy with minimal assistance. STG: Patient will complete basic mental manipulation tasks with 80% accuracy with minimal assistance. STG: Patient will completed divergent naming tasks with 80% accuracy with minimal assistance. LTG: Patient will increase neuro-linguistic abilities to increase safety and awareness of deficits. SPEECH LANGUAGE PATHOLOGY: COMMUNICATION Daily Note #5    MRN: [id number]    ADMISSION DATE: 8/26/2022  PRIMARY DIAGNOSIS: Physical debility  Physical debility [R53.81]    ICD-10: Treatment Diagnosis: R41.841 Cognitive-Communication Deficit    RECOMMENDATIONS:   Recommendations: Follow up cognitive assessment     Patient continues to require skilled intervention:     Cognitive treatment     ASSESSMENT   Patient participated with cognitive treatment. Reviewed basic WRAP memory strategy and provided examples of appropriate use. Patient answered questions related to a visual scene Marta and recalled visual information with a 10 minute delay with 90% accuracy. Required cueing initially to recall how to navigate to his room. Recommend continued ST to address cognitive function. GENERAL   Sitting up in the bed. Pain:        No current complaints of pain          OBJECTIVE   Decreased problem solving and immediate/short-term memory. Suspect some cognitive deficits prior to admission now exacerbated with prolonged hospitalizaiton.   Recommend ST to address cognitive function for improved independence and safety at discharge    PLAN    Duration/Frequency: Continue to follow patient 5x/week for duration of hospitalization and/or until goals met         MODIFIED JACK SCALE (mRS) SCORE: 1  Interpretation of Tool: The Modified Bent Scale is a scale used to quantify level of disability as it relates to a patient's functional abilities. No Symptoms(0); No significant disability despite symptoms; able to carry out all usual duties and activities(1); Slight disability; unable to carry out all previous activities but able to look after own affairs without assistance(2); Moderate disability; requiring some help but able to walk without assistance(3); Moderately severe disability; unable to walk without assistance and unable to attend to own bodily needs without assistance(4); Severe disability; bedridden, incontinent, and requiring constant nursing care and attention(5)      Education:     Role of ST   Follow up session     Current Medications:   No current facility-administered medications on file prior to encounter. Current Outpatient Medications on File Prior to Encounter   Medication Sig Dispense Refill    furosemide (LASIX) 20 MG tablet Take 1 tablet by mouth in the morning. 60 tablet 3    tamsulosin (FLOMAX) 0.4 MG capsule Take 1 capsule by mouth in the morning. 30 capsule 3    pantoprazole (PROTONIX) 40 MG tablet Take 1 tablet by mouth in the morning and 1 tablet in the evening. Take before meals. 30 tablet 3    amLODIPine (NORVASC) 5 MG tablet Take 1 tablet by mouth in the morning. 30 tablet 3    OLANZapine zydis (ZYPREXA) 5 MG disintegrating tablet Take 1 tablet by mouth nightly 30 tablet 3    QUEtiapine (SEROQUEL) 25 MG tablet Take 1 tablet by mouth in the morning.  60 tablet 3    rosuvastatin (CRESTOR) 10 MG tablet TAKE 1 TABLET BY MOUTH EVERY DAY 90 tablet 0    tiZANidine (ZANAFLEX) 2 MG tablet Take 1 tablet by mouth every 8 hours as needed (muscle spasms) 30 tablet 1    fluticasone-salmeterol (ADVAIR) 250-50 MCG/ACT AEPB diskus inhaler Inhale 1 puff into the lungs in the morning and at bedtime      calcitonin (MIACALCIN) 200 UNIT/ACT nasal spray 1 spray by Nasal route daily 1 each 1    albuterol sulfate  (90 Base) MCG/ACT inhaler USE 2 PUFFS BY INHALATION 4 TIMES DAILY AS NEEDED FOR WHEEZING OR SHORTNESS OF BREATH.      amiodarone (CORDARONE) 200 MG tablet Take 200 mg by mouth 2 times daily      apixaban (ELIQUIS) 5 MG TABS tablet Take 5 mg by mouth every 12 hours      ascorbic acid (VITAMIN C) 500 MG tablet Take 500 mg by mouth      carvedilol (COREG) 25 MG tablet Take 25 mg by mouth 2 times daily (with meals)      Cholecalciferol 50 MCG (2000 UT) TABS Take 2,000 Units by mouth      clopidogrel (PLAVIX) 75 MG tablet Take 75 mg by mouth daily      fluticasone (FLONASE) 50 MCG/ACT nasal spray USE 1 OR 2 SPRAYS IN EACH NOSTRIL EVERY DAY. glipiZIDE (GLUCOTROL XL) 10 MG extended release tablet TAKE 1 TABLET BY MOUTH TWICE DAILY      glucosamine-chondroitin 750-600 MG TABS tablet Take by mouth 2 times daily      guaiFENesin 1200 MG TB12 Take 1,200 mg by mouth 2 times daily as needed      latanoprost (XALATAN) 0.005 % ophthalmic solution Apply 1 drop to eye      magnesium oxide (MAG-OX) 400 (240 Mg) MG tablet TAKE 1 TABLET BY MOUTH EVERY DAY      montelukast (SINGULAIR) 10 MG tablet TAKE 1 TABLET BY MOUTH EVERY DAY AT BEDTIME      nitroGLYCERIN (NITROSTAT) 0.4 MG SL tablet Place 0.4 mg under the tongue      polyethylene glycol (GLYCOLAX) 17 GM/SCOOP powder Take 17 g by mouth daily      SITagliptin (JANUVIA) 100 MG tablet TAKE 1 TABLET BY MOUTH EVERY DAY      valsartan (DIOVAN) 320 MG tablet Take 320 mg by mouth daily      [DISCONTINUED] metFORMIN (GLUCOPHAGE) 500 MG tablet TAKE TWO TABLETS BY MOUTH 2 TIMES A DAY       PRECAUTIONS/ALLERGIES: Azithromycin and Oxaprozin          Therapy Time  SLP Individual Minutes  Time In: 1004  Time Out: 1031  Minutes: 27     SLP Total Treatment Time  Total Treatment Time: [address].  MICHAEL Frye  9/6/2022 11:12 AM

## 2022-09-06 NOTE — PROGRESS NOTES
VANCO DAILY FOLLOW UP NOTE  [address] Pharmacokinetic Monitoring Service - Vancomycin    Consulting Provider: Dr. Pratibha Vila   Indication: L2-L4 osteodiscitis  Target Concentration: Goal AUC/GIOVANNY 400-600 mg*hr/L  Duration of Therapy: EOT 9/9/22  Additional Antimicrobials: ceftriaxone    Pertinent Laboratory Values: Wt Readings from Last 1 Encounters:   08/26/22 237 lb (107.5 kg)     Temp Readings from Last 1 Encounters:   09/06/22 98 °F (36.7 °C) (Oral)     Recent Labs     09/06/22  0415   BUN 15   CREATININE 0.80   WBC 7.1       Estimated Creatinine Clearance: 98 mL/min (based on SCr of 0.8 mg/dL). Lab Results   Component Value Date/Time    VANCOTROUGH 18.2 08/26/2022 07:30 AM    VANCORANDOM 16.2 09/01/2022 06:30 AM       MRSA Nasal Swab: N/A. Non-respiratory infection.       Assessment:  Date/Time Dose Concentration AUC   8/26 0730 1000 mg q12h Tr = 18.2 [facility]   8/28 0006  1000 mg q12h 20.2 570   8/30 0418 750 mg q12h 15.7 402   9/1 0630 1250 mg q18h 16.2 448   Note: Serum concentrations collected for AUC dosing may appear elevated if collected in close proximity to the dose administered, this is not necessarily an indication of toxicity    Plan:  Continue current dose of 1250 mg q18h  Repeat vancomycin concentrations and renal labs ordered as clinically appropriate  Pharmacy will continue to monitor patient and adjust therapy as indicated    Thank you for the consult,  Adelina Donato, PharmD, BCOP  Clinical Pharmacist  Contact [employer]

## 2022-09-06 NOTE — PROGRESS NOTES
[address]  Admit Date: 8/26/2022  Admit Diagnosis:   Physical debility [R53.81]    Subjective     Patient seen and examined after breakfast, reports to RN that PPM / defibrillator rep needs to be present for MRI. Describes hip / back pain as unchanged: sharp, burning through hips and LS, up spine. Sometimes he can identify the trigger, other times not. Denies bowel / bladder incontinence. More airway congestion this AM; stable cough, denies F/C, dyspnea. /70. Albumin, Hgb low but stable, other labs unremarkable.     Objective:     Current Facility-Administered Medications   Medication Dose Route Frequency    gabapentin (NEURONTIN) capsule 300 mg  300 mg Oral TID    traMADol (ULTRAM) tablet 50 mg  50 mg Oral Q6H PRN    glipiZIDE (GLUCOTROL) tablet 5 mg  5 mg Oral QAM AC    ferrous gluconate (FERGON) tablet 324 mg  324 mg Oral Daily with breakfast    sennosides-docusate sodium (SENOKOT-S) 8.6-50 MG tablet 2 tablet  2 tablet Oral Daily    vancomycin (VANCOCIN) 1250 mg in sodium chloride 0.9% 250 mL IVPB  1,250 mg IntraVENous Q18H    carvedilol (COREG) tablet 6.25 mg  6.25 mg Oral BID WC    furosemide (LASIX) tablet 40 mg  40 mg Oral Daily    metFORMIN (GLUCOPHAGE) tablet 500 mg  500 mg Oral BID WC    oxyCODONE (ROXICODONE) immediate release tablet 5 mg  5 mg Oral Q4H PRN    ondansetron (ZOFRAN-ODT) disintegrating tablet 4 mg  4 mg Oral Q6H PRN    traZODone (DESYREL) tablet 25 mg  25 mg Oral Nightly    albuterol sulfate HFA (PROVENTIL;VENTOLIN;PROAIR) 108 (90 Base) MCG/ACT inhaler 2 puff  2 puff Inhalation BID    albuterol sulfate HFA (PROVENTIL;VENTOLIN;PROAIR) 108 (90 Base) MCG/ACT inhaler 2 puff  2 puff Inhalation Q6H PRN    sodium chloride flush 0.9 % injection 10 mL  10 mL IntraVENous BID    miconazole (MICOTIN) 2 % powder   Topical BID    acetaminophen (TYLENOL) tablet 650 mg  650 mg Oral TID    amiodarone (CORDARONE) tablet 200 mg  200 mg Oral Daily apixaban (ELIQUIS) tablet 5 mg  5 mg Oral BID    atorvastatin (LIPITOR) tablet 20 mg  20 mg Oral Nightly    bisacodyl (DULCOLAX) suppository 10 mg  10 mg Rectal Daily PRN    calcitonin (MIACALCIN) nasal spray 1 spray  1 spray Alternating Nares Daily    clopidogrel (PLAVIX) tablet 75 mg  75 mg Oral Daily    fluticasone (FLONASE) 50 MCG/ACT nasal spray 1 spray  1 spray Each Nostril Daily    guaiFENesin (MUCINEX) extended release tablet 1,200 mg  1,200 mg Oral BID    insulin glargine (LANTUS) injection vial 14 Units  14 Units SubCUTAneous Nightly    insulin lispro (HUMALOG) injection vial 0-16 Units  0-16 Units SubCUTAneous TID WC    insulin lispro (HUMALOG) injection vial 0-4 Units  0-4 Units SubCUTAneous Nightly    latanoprost (XALATAN) 0.005 % ophthalmic solution 1 drop  1 drop Both Eyes Nightly    lidocaine 4 % external patch 1 patch  1 patch TransDERmal Daily    [Held by provider] losartan (COZAAR) tablet 100 mg  100 mg Oral Daily    melatonin tablet 6 mg  6 mg Oral Nightly    montelukast (SINGULAIR) tablet 10 mg  10 mg Oral Nightly    naproxen (NAPROSYN) tablet 500 mg  500 mg Oral BID WC    pantoprazole (PROTONIX) tablet 40 mg  40 mg Oral BID AC    polyethylene glycol (GLYCOLAX) packet 17 g  17 g Oral Daily    glucose chewable tablet 16 g  4 tablet Oral PRN    dextrose bolus 10% 125 mL  125 mL IntraVENous PRN    Or    dextrose bolus 10% 250 mL  250 mL IntraVENous PRN    glucagon (rDNA) injection 1 mg  1 mg SubCUTAneous PRN    dextrose 10 % infusion   IntraVENous Continuous PRN    mometasone-formoterol (DULERA) 100-5 MCG/ACT inhaler 2 puff  2 puff Inhalation BID    cefTRIAXone (ROCEPHIN) 2,000 mg in sodium chloride 0.9 % 50 mL IVPB mini-bag  2,000 mg IntraVENous Q24H        Review of Systems:   Denies chest pain, headache, visual problems, abdominal pain, dysuria, calf pain. Pertinent positives are as noted in the HPI, ROS unremarkable otherwise.      Visit Vitals  BP (!) 149/70   Pulse 69   Temp 98 °F (36.7 °C) (Oral)   Resp 16   Ht 6' 1\" (1.854 m)   Wt 237 lb (107.5 kg)   SpO2 91%   BMI 31.27 kg/m²        Physical Exam:   General: Alert, appropriately oriented. Lying in bed after breakfast.   HEENT: Normocephalic, EOM intact. Oral mucosa moist.   Lungs: Coarse breath sounds bilaterally, +rhonchi. Respirations even and unlabored. Heart: Regular rate, mostly regular underlying rhythm. No appreciable murmur but heart sounds muffled. Abdomen: Soft, non-tender, obese and protuberant but not distended. Bowel sounds normoactive, no organomegaly. Genitourinary: Deferred. Neuromuscular:      Speech clear, thoughts cogent. Formal MMT not repeated today but OLGUIN. Generalized weakness, no focal neuro deficits. Exam limited by anticipation of pain. Skin/extremity: No rashes, no erythema. Calves soft, non-tender B LE. No edema. Riley Fall Risk Assessment:  History of Falling: Yes     Swallow Assessment:  Swallow Screening  Is the patient unable to remain alert for testing?: No  Is the patient on a modified diet (thickened liquids) due to pre-existing dysphagia?: No  Is there presence of existing enteral tube feeding via the stomach or nose?: No  Is there presence of head-of-bed restrictions (less than 30 degrees)?: No  Is there presence of tracheotomy tube?: No  Is the patient ordered nothing-by-mouth status?: No  3 oz Water Swallow Screen: Pass      Ambulation:  Non-ambulatory.     Labs/Studies:  Recent Results (from the past 72 hour(s))   POCT Glucose    Collection Time: 09/03/22 11:08 AM   Result Value Ref Range    POC Glucose 193 (H) 65 - 100 mg/dL    Performed by: Rolandeoromero    POCT Glucose    Collection Time: 09/03/22  4:23 PM   Result Value Ref Range    POC Glucose 74 65 - 100 mg/dL    Performed by: "[email or web address]eorge    POCT Glucose    Collection Time: 09/03/22  8:52 PM   Result Value Ref Range    POC Glucose 126 (H) 65 - 100 mg/dL    Performed by: Donnie Cornejo    POCT Glucose    Collection Time: 09/04/22  5:52 AM   Result Value Ref Range    POC Glucose 120 (H) 65 - 100 mg/dL    Performed by: Zachery Flores    POCT Glucose    Collection Time: 09/04/22 11:20 AM   Result Value Ref Range    POC Glucose 154 (H) 65 - 100 mg/dL    Performed by: Lucas    POCT Glucose    Collection Time: 09/04/22  4:04 PM   Result Value Ref Range    POC Glucose 164 (H) 65 - 100 mg/dL    Performed by: Loy    POCT Glucose    Collection Time: 09/04/22  9:05 PM   Result Value Ref Range    POC Glucose 131 (H) 65 - 100 mg/dL    Performed by: [facility] ([facility] AT [place]    POCT Glucose    Collection Time: 09/05/22  6:17 AM   Result Value Ref Range    POC Glucose 160 (H) 65 - 100 mg/dL    Performed by: Pam    POCT Glucose    Collection Time: 09/05/22 11:24 AM   Result Value Ref Range    POC Glucose 191 (H) 65 - 100 mg/dL    Performed by: Plummer ([place])CNA    POCT Glucose    Collection Time: 09/05/22  4:23 PM   Result Value Ref Range    POC Glucose 101 (H) 65 - 100 mg/dL    Performed by: Hortencia)CNA    POCT Glucose    Collection Time: 09/05/22  8:58 PM   Result Value Ref Range    POC Glucose 134 (H) 65 - 100 mg/dL    Performed by: [facility]    Comprehensive Metabolic Panel w/ Reflex to MG    Collection Time: 09/06/22  4:15 AM   Result Value Ref Range    Sodium 142 136 - 145 mmol/L    Potassium 3.7 3.5 - 5.1 mmol/L    Chloride 106 101 - 110 mmol/L    CO2 31 21 - 32 mmol/L    Anion Gap 5 4 - 13 mmol/L    Glucose 93 65 - 100 mg/dL    BUN 15 8 - 23 MG/DL    Creatinine 0.80 0.8 - 1.5 MG/DL    GFR African American >60 >60 ml/min/1.73m2    GFR Non- >60 >60 ml/min/1.73m2    Calcium 8.4 8.3 - 10.4 MG/DL    Total Bilirubin 0.4 0.2 - 1.1 MG/DL    ALT 45 12 - 65 U/L    AST 24 15 - 37 U/L    Alk Phosphatase 91 50 - 136 U/L    Total Protein 6.0 (L) 6.3 - 8.2 g/dL    Albumin 2.4 (L) 3.2 - 4.6 g/dL    Globulin 3.6 (H) 2.3 - 3.5 g/dL    Albumin/Globulin Ratio 0.7 (L) 1.2 - 3.5     CBC    Collection Time: 09/06/22  4:15 AM   Result Value Ref Range    WBC 7.1 4.3 - 11.1 K/uL    RBC 2.74 (L) 4.23 - 5.6 M/uL    Hemoglobin 8.4 (L) 13.6 - 17.2 g/dL    Hematocrit 26.5 (L) 41.1 - 50.3 %    MCV 96.7 79.6 - 97.8 FL    MCH 30.7 26.1 - 32.9 PG    MCHC 31.7 31.4 - 35.0 g/dL    RDW 16.7 (H) 11.9 - 14.6 %    Platelets 632 223 - 871 K/uL    MPV 10.4 9.4 - 12.3 FL    nRBC 0.00 0.0 - 0.2 K/uL   POCT Glucose    Collection Time: 09/06/22  6:22 AM   Result Value Ref Range    POC Glucose 103 (H) 65 - 100 mg/dL    Performed by: Tamiko        Assessment:     Principal Problem:    Physical debility  Active Problems:    ICD (implantable cardioverter-defibrillator) in place    General weakness    Back pain    Anemia    Atrial fibrillation (HCC)    Complicated wound infection    HTN (hypertension)    Pulmonary emphysema (HCC)    COPD (chronic obstructive pulmonary disease) ([facility])    Cigarette nicotine dependence in remission    Diabetic neuropathy ([facility])    Chronic pain of right knee    Ischemic cardiomyopathy    PAD (peripheral artery disease) ([facility])    Hyperlipidemia    DM (diabetes mellitus) type II, controlled, with peripheral vascular disorder ([facility])    Obstructive sleep apnea    IMNI (acute kidney injury) ([facility] [address]  Resolved Problems:    * No resolved hospital problems. 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 I have no doubt that pt can actively participate and tolerate as well as benefit from aggressive rehab. -9/5 motivated but pain keeps him from progressing, may need skilled placement     Hypo/HTN - BP fluctuating, managed medically. Continue amlodipine 5mg daily, carvedilol 25mg BID, losartan 100mg daily and furosemide 40mg daily.   -8/28 /68 this AM, yesterday asymptomatic LOW BP 93/58, 103/52; will hold amlodipine, losartan; continue to monitor, adjust as needed  -8/29 /73 before AM meds, recheck before lunch; consider adding back amlodipine or losartan at 1/2- or 1/4-strength if needed  -8/30 symptomatic OH this AM before meds; /55, decrease carvedilol to 6.25mg BID, continue holding amlodipine, losartan  -8/31 /69 before AM meds, 118/66 after  -9/1 /55  -9/2 /59; current meds: carvedilol 6.25mg BID, furosemide 40mg daily  -9/5 VSS  -9/6 /70 before AM meds     Infectious disease - lumbar spine epidural abscess, psoas abscess, osteomyelitis of lower spine. Continue ceftriaxone and vancomycin through 9/9, already completed 6wks of ABX.  -8/28 WBC 6.5  -9/2 WBC 9.0, patient remains afebrile  -9/5 4 more days of ABX; MRI tomorrow      Pain management - ongoing significant pain in back and paresthesic neuropathic pain requiring PRN pain meds. Will require regular pain assessment and comprehensive pain management. Had AMS with numerous pain medications post-operatively in acute and at [facility].  Will add gabapentin 100mg TID.  -8/28 continue scheduled APAP TID, naproxen BID, lidocaine patch  -8/29 unable to participate in ADLs earlier this AM due to pain; Physiatrist restarted oxycodone 5mg IR at 07h30, and by ~11h15, patient able to ambulate with PT   -8/31 increase gabapentin to 200mg TID; patient with initial good response to gabapentin  -9/1 reminded patient he has oxycodone IR for LS pain, has used only 2 doses since 8/29; he admits to aversion to narcotics, concerned about addiction  -9/2 doing well on gabapentin 200mg TID  -9/3 pain is quite debilitating and could not stand or attempt to walk; he must take the narcotics; will add tramadol; will increase gabapentin to 300mg TID; needs MRI of lumbar spine; no f/u MRI post kyphoplasty with known central canal stenosis  -9//5 pain remains the limiting factor; tolerating increased dose of gabapentin; due to holiday, MRI ordered Sunday cannot be performed until tomorrow, 9/6     Diabetes mellitus - HgbA1c 6.0% (8/10/2022), was 5.5% in May prior to kyphoplasty. Continue Lantus 14u at bedtime, Humalog SSI. Adjust as needed. Per EMR, prior to admission he was on metformin 500mg BID, Januvia 100mg daily, glipizide XL 10mg daily. -8/28  this AM, yesterday 127, 209, 158, 151  -8/29  this AM; ranged 125-195 yesterday  -8/30  this AM, ranged 138-280 yesterday; resume home dose metformin 500mg BID (Cr / BUN normal 8/28)  -8/31  this AM, ranged 140-244 yesterday; resume glipizide (not XL) tomorrow but at 2.5mg strength  -9/1  this AM; observe today's values for effect from glipizide started this AM  -9/2  this AM, ranged 109-187 (better) yesterday after 1st dose glipizide 2.5mg; room for improvement, will increase to 5mg starting tomorrow  -9/3  this AM, 111 at bedtime, 87 at supper, 152 at lunch and 136 yesterday AM; lunch today 193; increasing glipizide to 5mg daily, continue metformin 500mg BID and Lantus 14u at bedtime  -9/5  this AM, last bedtime 131, supper 164, lunch 154; continue current schedule  -9/6  this AM; yesterday's values from AM to PM: 160, 191, 101, 134; no medication change     Electrolyte abnormality - hypokalemia, K+ 3.4 (8/25); replace and monitor.   -8/28 K+ 3.7  -9/2 normal BMP this AM  -9/6 normal BMP again this AM     Hyperlipidemia - continue daily Lipitor 20mg. Cardiomyopathy, CAD, history of Vtach - s/p ICD; compensated. Continue Plavix 75mg daily. Atrial fibrillation - continue amiodarone 200mg daily, Eliquis 5mg BID. Anemia - Hgb 8.8 on 8/22. History of normocytic anemia going back ~8y in EMR.  Monitor.  -8/28 Hgb 7.9, recheck CBC in AM  -8/29 Hgb 7.8, drifting downward; consider PO iron  -9/2 Hgb 8.3, iron studies with low iron / TIBC / transferrin; add PO iron with breakfast  9/6 Hgb 8.4, low but stable     COPD - continue Dulera, Singulair and Mucinex; well compensated. 8/30, coarse breath sounds, upper airway wheeze - CXR with early pulmonary edema. Add one-time dose bumetanide 2mg now and continue daily furosemide 40mg.  -9/1 clear breath sounds, no pedal edema  -9/2 increased O2 needs with CPAP overnight, rhonchi: repeat CXR this AM  -9/3 CXR with no change, down to using 2L O2  -9/5 on RA during the day, 2L bled in to CPAP at night  -9/6 more rhonchi, airway congestion this AM; stable cough, denies F/C, dyspnea; needs neb treatment from respiratory     Pneumonia prophylaxis - incentive spirometer every hour while awake. DVT risk / DVT prophylaxis - daily physician / PA exam to assess as patient is at increased risk for of thromboembolism. Mobilize as tolerated. Sequential pneumatic compression devices (SCDs) when in bed; thigh-high or knee-high thromboembolic deterrent hose when out of bed. On Eliquis. GI prophylaxis - resume PPI. At times may need additional antacids, Maalox prn. History of tobacco use - stress abstinence, education. Reportedly quit 15y ago but continues with chewing tobacco.     General skin care / wound prevention - monitor general skin wound status daily per staff and physician / PA. At risk for failure due to impaired mobility. Bladder program / urinary retention / neurogenic bladder - schedule voids q6-8h. Check post-void residual as needed; in-and-out catheter if post-void residual is more than 400ml. Bowel program - at risk for constipation as a side effect of opioids, other medications, impaired mobility, etc. MiraLAX daily for regularity, Joanna-Colace for stool softener. PRN MOM, bisacodyl suppository or tablets for constipation.   -8/30 c/o constipation, normally uses Fleets enema at home but declines that here; no rectal pressure, just mid-belly fullness; bisacodyl 10mg PO now  -8/31 no BM with bisacodyl PO, pharmacy does not have mag citrate; MOM 30ml now  -9/1 large BM x 2 after MOM yesterday  -9/3 states his bowels have improved     Sleep disruption, 8/29 - no relief from scheduled Vistaril 25mg at bedtime. Does not require medications for sleep at home. Start low-dose trazodone cautiously, as patient had AMS in [place] attributed to polypharmacy. -8/30 slept well on trazodone, denies hangover effect this AM  -8/31 sleeping well, continue trazodone        Time spent was 25 minutes with over 1/2 in direct patient care/examination, consultation and coordination of care. Signed By: Jose Del Real PA-C    September 6, 2022      Physician Assistant with [facility]  Jaky Ahn MD, Medical Director

## 2022-09-06 NOTE — PROGRESS NOTES
OT Daily Note  Time In 1345   Time Out 1431     Pain:  Pt had significant pain; consulted with TEMI Fung and gave pt an ice pack. Functional Mobility      Score Comments   Supine to Sit Substantial/maximal assistance Significant A   Sit to Stand Dependent Ax3 for safety   Transfer Assist Dependent Ax3 for safety     Pt was transferred off stretcher into w/c. Activity Tolerance   Pt completed prolonged shoulder stabilization task to promote UB strength and activity tolerance for integration into functional transfers. Pt worked on matching playing cards. Pt had significant pain when he leaned forward. Pt struggled to scan cards possibly due to his glaucoma, but therapist believes there was a cognitive deficit as well. Pt required increased time and was distracted by pain. Pt responded well to verbal cueing. Pt required mod verbal cueing during the activity. Education   Purpose of activities. Interdisciplinary Communication: DELORES Carballo on pt's performance    Plan: Continue with POC. Pt was left with DELORES Carballo.      Madhu Rodriguez OTR/L  9/6/2022

## 2022-09-07 ENCOUNTER — HOSPITAL ENCOUNTER (INPATIENT)
Age: 77
LOS: 9 days | Discharge: NURSING, REHAB OR LONG-TERM CARE | DRG: 094 | End: 2022-09-16
Attending: INTERNAL MEDICINE | Admitting: FAMILY MEDICINE
Payer: MEDICARE

## 2022-09-07 VITALS
RESPIRATION RATE: 19 BRPM | SYSTOLIC BLOOD PRESSURE: 142 MMHG | DIASTOLIC BLOOD PRESSURE: 75 MMHG | TEMPERATURE: 98.8 F | WEIGHT: 237 LBS | OXYGEN SATURATION: 95 % | BODY MASS INDEX: 31.41 KG/M2 | HEART RATE: 68 BPM | HEIGHT: 73 IN

## 2022-09-07 DIAGNOSIS — K68.12 PSOAS MUSCLE ABSCESS (HCC): Primary | ICD-10-CM

## 2022-09-07 LAB
CRP SERPL-MCNC: 4.8 MG/DL (ref 0–0.9)
ERYTHROCYTE [SEDIMENTATION RATE] IN BLOOD: 4 MM/HR
GLUCOSE BLD STRIP.AUTO-MCNC: 100 MG/DL (ref 65–100)
GLUCOSE BLD STRIP.AUTO-MCNC: 102 MG/DL (ref 65–100)
GLUCOSE BLD STRIP.AUTO-MCNC: 105 MG/DL (ref 65–100)
GLUCOSE BLD STRIP.AUTO-MCNC: 146 MG/DL (ref 65–100)
GLUCOSE BLD STRIP.AUTO-MCNC: 98 MG/DL (ref 65–100)
PROCALCITONIN SERPL-MCNC: <0.05 NG/ML (ref 0–0.49)
SERVICE CMNT-IMP: ABNORMAL
SERVICE CMNT-IMP: NORMAL
SERVICE CMNT-IMP: NORMAL
VANCOMYCIN TROUGH SERPL-MCNC: 12.2 UG/ML (ref 5–20)

## 2022-09-07 PROCEDURE — 80202 ASSAY OF VANCOMYCIN: CPT

## 2022-09-07 PROCEDURE — 2700000000 HC OXYGEN THERAPY PER DAY

## 2022-09-07 PROCEDURE — 94760 N-INVAS EAR/PLS OXIMETRY 1: CPT

## 2022-09-07 PROCEDURE — 36415 COLL VENOUS BLD VENIPUNCTURE: CPT

## 2022-09-07 PROCEDURE — 2500000003 HC RX 250 WO HCPCS: Performed by: INTERNAL MEDICINE

## 2022-09-07 PROCEDURE — 85652 RBC SED RATE AUTOMATED: CPT

## 2022-09-07 PROCEDURE — 94640 AIRWAY INHALATION TREATMENT: CPT

## 2022-09-07 PROCEDURE — 6370000000 HC RX 637 (ALT 250 FOR IP): Performed by: PHYSICIAN ASSISTANT

## 2022-09-07 PROCEDURE — 84145 PROCALCITONIN (PCT): CPT

## 2022-09-07 PROCEDURE — 2580000003 HC RX 258: Performed by: INTERNAL MEDICINE

## 2022-09-07 PROCEDURE — 6370000000 HC RX 637 (ALT 250 FOR IP): Performed by: PHYSICAL MEDICINE & REHABILITATION

## 2022-09-07 PROCEDURE — 1100000000 HC RM PRIVATE

## 2022-09-07 PROCEDURE — 86140 C-REACTIVE PROTEIN: CPT

## 2022-09-07 PROCEDURE — 6370000000 HC RX 637 (ALT 250 FOR IP): Performed by: INTERNAL MEDICINE

## 2022-09-07 PROCEDURE — 6360000002 HC RX W HCPCS: Performed by: PHYSICAL MEDICINE & REHABILITATION

## 2022-09-07 PROCEDURE — 2580000003 HC RX 258: Performed by: PHYSICAL MEDICINE & REHABILITATION

## 2022-09-07 PROCEDURE — 1100000003 HC PRIVATE W/ TELEMETRY

## 2022-09-07 PROCEDURE — 2060000000 HC ICU INTERMEDIATE R&B

## 2022-09-07 PROCEDURE — 99239 HOSP IP/OBS DSCHRG MGMT >30: CPT | Performed by: PHYSICAL MEDICINE & REHABILITATION

## 2022-09-07 RX ORDER — BISACODYL 10 MG
10 SUPPOSITORY, RECTAL RECTAL DAILY PRN
Status: DISCONTINUED | OUTPATIENT
Start: 2022-09-07 | End: 2022-09-16 | Stop reason: HOSPADM

## 2022-09-07 RX ORDER — ACETAMINOPHEN 325 MG/1
650 TABLET ORAL 3 TIMES DAILY
Status: CANCELLED | OUTPATIENT
Start: 2022-09-07

## 2022-09-07 RX ORDER — FLUTICASONE PROPIONATE 50 MCG
1 SPRAY, SUSPENSION (ML) NASAL DAILY
Status: DISCONTINUED | OUTPATIENT
Start: 2022-09-08 | End: 2022-09-16 | Stop reason: HOSPADM

## 2022-09-07 RX ORDER — LATANOPROST 50 UG/ML
1 SOLUTION/ DROPS OPHTHALMIC NIGHTLY
Status: DISCONTINUED | OUTPATIENT
Start: 2022-09-07 | End: 2022-09-16 | Stop reason: HOSPADM

## 2022-09-07 RX ORDER — SODIUM CHLORIDE 0.9 % (FLUSH) 0.9 %
10 SYRINGE (ML) INJECTION 2 TIMES DAILY
Status: DISCONTINUED | OUTPATIENT
Start: 2022-09-07 | End: 2022-09-16 | Stop reason: HOSPADM

## 2022-09-07 RX ORDER — TRAZODONE HYDROCHLORIDE 50 MG/1
25 TABLET ORAL NIGHTLY
Status: CANCELLED | OUTPATIENT
Start: 2022-09-07

## 2022-09-07 RX ORDER — CARVEDILOL 6.25 MG/1
6.25 TABLET ORAL 2 TIMES DAILY WITH MEALS
Status: CANCELLED | OUTPATIENT
Start: 2022-09-07

## 2022-09-07 RX ORDER — SODIUM CHLORIDE 0.9 % (FLUSH) 0.9 %
10 SYRINGE (ML) INJECTION AS NEEDED
Status: DISCONTINUED | OUTPATIENT
Start: 2022-09-07 | End: 2022-09-16 | Stop reason: HOSPADM

## 2022-09-07 RX ORDER — MONTELUKAST SODIUM 10 MG/1
10 TABLET ORAL NIGHTLY
Status: DISCONTINUED | OUTPATIENT
Start: 2022-09-07 | End: 2022-09-16 | Stop reason: HOSPADM

## 2022-09-07 RX ORDER — TRAZODONE HYDROCHLORIDE 50 MG/1
25 TABLET ORAL NIGHTLY
Status: DISCONTINUED | OUTPATIENT
Start: 2022-09-07 | End: 2022-09-16 | Stop reason: HOSPADM

## 2022-09-07 RX ORDER — LIDOCAINE 4 G/G
1 PATCH TOPICAL DAILY
Status: DISCONTINUED | OUTPATIENT
Start: 2022-09-08 | End: 2022-09-16 | Stop reason: HOSPADM

## 2022-09-07 RX ORDER — OXYCODONE HYDROCHLORIDE 5 MG/1
5 TABLET ORAL EVERY 4 HOURS PRN
Status: DISCONTINUED | OUTPATIENT
Start: 2022-09-07 | End: 2022-09-16 | Stop reason: HOSPADM

## 2022-09-07 RX ORDER — ATORVASTATIN CALCIUM 20 MG/1
20 TABLET, FILM COATED ORAL NIGHTLY
Status: DISCONTINUED | OUTPATIENT
Start: 2022-09-07 | End: 2022-09-16 | Stop reason: HOSPADM

## 2022-09-07 RX ORDER — GUAIFENESIN 600 MG/1
1200 TABLET, EXTENDED RELEASE ORAL 2 TIMES DAILY
Status: DISCONTINUED | OUTPATIENT
Start: 2022-09-07 | End: 2022-09-16 | Stop reason: HOSPADM

## 2022-09-07 RX ORDER — ALBUTEROL SULFATE 90 UG/1
2 AEROSOL, METERED RESPIRATORY (INHALATION) EVERY 6 HOURS PRN
Status: CANCELLED | OUTPATIENT
Start: 2022-09-07

## 2022-09-07 RX ORDER — TRAMADOL HYDROCHLORIDE 50 MG/1
50 TABLET ORAL EVERY 6 HOURS PRN
Status: DISCONTINUED | OUTPATIENT
Start: 2022-09-07 | End: 2022-09-16 | Stop reason: HOSPADM

## 2022-09-07 RX ORDER — GABAPENTIN 300 MG/1
300 CAPSULE ORAL 3 TIMES DAILY
Status: DISCONTINUED | OUTPATIENT
Start: 2022-09-07 | End: 2022-09-16 | Stop reason: HOSPADM

## 2022-09-07 RX ORDER — ATORVASTATIN CALCIUM 10 MG/1
20 TABLET, FILM COATED ORAL NIGHTLY
Status: CANCELLED | OUTPATIENT
Start: 2022-09-07

## 2022-09-07 RX ORDER — FLUTICASONE PROPIONATE 50 MCG
1 SPRAY, SUSPENSION (ML) NASAL DAILY
Status: CANCELLED | OUTPATIENT
Start: 2022-09-08

## 2022-09-07 RX ORDER — FUROSEMIDE 40 MG/1
40 TABLET ORAL DAILY
Status: DISCONTINUED | OUTPATIENT
Start: 2022-09-08 | End: 2022-09-16 | Stop reason: HOSPADM

## 2022-09-07 RX ORDER — ONDANSETRON 4 MG/1
4 TABLET, ORALLY DISINTEGRATING ORAL EVERY 6 HOURS PRN
Status: CANCELLED | OUTPATIENT
Start: 2022-09-07

## 2022-09-07 RX ORDER — AMIODARONE HYDROCHLORIDE 200 MG/1
200 TABLET ORAL DAILY
Status: CANCELLED | OUTPATIENT
Start: 2022-09-08

## 2022-09-07 RX ORDER — DEXTROSE MONOHYDRATE 100 MG/ML
INJECTION, SOLUTION INTRAVENOUS CONTINUOUS PRN
Status: DISCONTINUED | OUTPATIENT
Start: 2022-09-07 | End: 2022-09-16 | Stop reason: HOSPADM

## 2022-09-07 RX ORDER — GLIPIZIDE 5 MG/1
5 TABLET ORAL
Status: CANCELLED | OUTPATIENT
Start: 2022-09-08

## 2022-09-07 RX ORDER — ACETAMINOPHEN 325 MG/1
650 TABLET ORAL 3 TIMES DAILY
Status: DISCONTINUED | OUTPATIENT
Start: 2022-09-07 | End: 2022-09-16 | Stop reason: HOSPADM

## 2022-09-07 RX ORDER — INSULIN LISPRO 100 [IU]/ML
0-4 INJECTION, SOLUTION INTRAVENOUS; SUBCUTANEOUS NIGHTLY
Status: CANCELLED | OUTPATIENT
Start: 2022-09-07

## 2022-09-07 RX ORDER — AMIODARONE HYDROCHLORIDE 200 MG/1
200 TABLET ORAL DAILY
Status: DISCONTINUED | OUTPATIENT
Start: 2022-09-08 | End: 2022-09-16 | Stop reason: HOSPADM

## 2022-09-07 RX ORDER — INSULIN GLARGINE 100 [IU]/ML
14 INJECTION, SOLUTION SUBCUTANEOUS NIGHTLY
Status: CANCELLED | OUTPATIENT
Start: 2022-09-07

## 2022-09-07 RX ORDER — INSULIN GLARGINE 100 [IU]/ML
14 INJECTION, SOLUTION SUBCUTANEOUS NIGHTLY
Status: DISCONTINUED | OUTPATIENT
Start: 2022-09-07 | End: 2022-09-16 | Stop reason: HOSPADM

## 2022-09-07 RX ORDER — NAPROXEN 250 MG/1
500 TABLET ORAL 2 TIMES DAILY WITH MEALS
Status: DISCONTINUED | OUTPATIENT
Start: 2022-09-08 | End: 2022-09-16 | Stop reason: HOSPADM

## 2022-09-07 RX ORDER — DOXYCYCLINE HYCLATE 50 MG/1
324 CAPSULE, GELATIN COATED ORAL
Status: DISCONTINUED | OUTPATIENT
Start: 2022-09-08 | End: 2022-09-16 | Stop reason: HOSPADM

## 2022-09-07 RX ORDER — CLOPIDOGREL BISULFATE 75 MG/1
75 TABLET ORAL DAILY
Status: CANCELLED | OUTPATIENT
Start: 2022-09-08

## 2022-09-07 RX ORDER — PANTOPRAZOLE SODIUM 40 MG/1
40 TABLET, DELAYED RELEASE ORAL
Status: CANCELLED | OUTPATIENT
Start: 2022-09-07

## 2022-09-07 RX ORDER — SENNA AND DOCUSATE SODIUM 50; 8.6 MG/1; MG/1
2 TABLET, FILM COATED ORAL DAILY
Status: DISCONTINUED | OUTPATIENT
Start: 2022-09-08 | End: 2022-09-16 | Stop reason: HOSPADM

## 2022-09-07 RX ORDER — INSULIN LISPRO 100 [IU]/ML
0-16 INJECTION, SOLUTION INTRAVENOUS; SUBCUTANEOUS
Status: CANCELLED | OUTPATIENT
Start: 2022-09-07

## 2022-09-07 RX ORDER — CLOPIDOGREL BISULFATE 75 MG/1
75 TABLET ORAL DAILY
Status: DISCONTINUED | OUTPATIENT
Start: 2022-09-08 | End: 2022-09-16 | Stop reason: HOSPADM

## 2022-09-07 RX ORDER — ALBUTEROL SULFATE 90 UG/1
2 AEROSOL, METERED RESPIRATORY (INHALATION) EVERY 6 HOURS PRN
Status: DISCONTINUED | OUTPATIENT
Start: 2022-09-07 | End: 2022-09-16 | Stop reason: HOSPADM

## 2022-09-07 RX ORDER — OXYCODONE HYDROCHLORIDE 5 MG/1
5 TABLET ORAL EVERY 4 HOURS PRN
Status: CANCELLED | OUTPATIENT
Start: 2022-09-07

## 2022-09-07 RX ORDER — ALBUTEROL SULFATE 90 UG/1
2 AEROSOL, METERED RESPIRATORY (INHALATION) 2 TIMES DAILY
Status: DISCONTINUED | OUTPATIENT
Start: 2022-09-08 | End: 2022-09-08

## 2022-09-07 RX ORDER — CALCITONIN SALMON 200 [IU]/.09ML
1 SPRAY, METERED NASAL DAILY
Status: DISCONTINUED | OUTPATIENT
Start: 2022-09-08 | End: 2022-09-16 | Stop reason: HOSPADM

## 2022-09-07 RX ORDER — SODIUM CHLORIDE 0.9 % (FLUSH) 0.9 %
10 SYRINGE (ML) INJECTION 2 TIMES DAILY
Status: CANCELLED | OUTPATIENT
Start: 2022-09-07

## 2022-09-07 RX ORDER — POLYETHYLENE GLYCOL 3350 17 G/17G
17 POWDER, FOR SOLUTION ORAL DAILY
Status: CANCELLED | OUTPATIENT
Start: 2022-09-08

## 2022-09-07 RX ORDER — POLYETHYLENE GLYCOL 3350 17 G/17G
17 POWDER, FOR SOLUTION ORAL DAILY
Status: DISCONTINUED | OUTPATIENT
Start: 2022-09-08 | End: 2022-09-16 | Stop reason: HOSPADM

## 2022-09-07 RX ORDER — DOXYCYCLINE HYCLATE 50 MG/1
324 CAPSULE, GELATIN COATED ORAL
Status: CANCELLED | OUTPATIENT
Start: 2022-09-08

## 2022-09-07 RX ORDER — ONDANSETRON 4 MG/1
4 TABLET, ORALLY DISINTEGRATING ORAL EVERY 6 HOURS PRN
Status: DISCONTINUED | OUTPATIENT
Start: 2022-09-07 | End: 2022-09-16 | Stop reason: HOSPADM

## 2022-09-07 RX ORDER — GUAIFENESIN 600 MG/1
1200 TABLET, EXTENDED RELEASE ORAL 2 TIMES DAILY
Status: CANCELLED | OUTPATIENT
Start: 2022-09-07

## 2022-09-07 RX ORDER — PANTOPRAZOLE SODIUM 40 MG/1
40 TABLET, DELAYED RELEASE ORAL
Status: DISCONTINUED | OUTPATIENT
Start: 2022-09-08 | End: 2022-09-16 | Stop reason: HOSPADM

## 2022-09-07 RX ORDER — CALCITONIN SALMON 200 [IU]/.09ML
1 SPRAY, METERED NASAL DAILY
Status: CANCELLED | OUTPATIENT
Start: 2022-09-08

## 2022-09-07 RX ORDER — LANOLIN ALCOHOL/MO/W.PET/CERES
6 CREAM (GRAM) TOPICAL NIGHTLY
Status: CANCELLED | OUTPATIENT
Start: 2022-09-07

## 2022-09-07 RX ORDER — FUROSEMIDE 40 MG/1
40 TABLET ORAL DAILY
Status: CANCELLED | OUTPATIENT
Start: 2022-09-08

## 2022-09-07 RX ORDER — LANOLIN ALCOHOL/MO/W.PET/CERES
6 CREAM (GRAM) TOPICAL NIGHTLY
Status: DISCONTINUED | OUTPATIENT
Start: 2022-09-07 | End: 2022-09-16 | Stop reason: HOSPADM

## 2022-09-07 RX ORDER — GLIPIZIDE 5 MG/1
5 TABLET ORAL
Status: DISCONTINUED | OUTPATIENT
Start: 2022-09-08 | End: 2022-09-16 | Stop reason: HOSPADM

## 2022-09-07 RX ORDER — INSULIN LISPRO 100 [IU]/ML
0-4 INJECTION, SOLUTION INTRAVENOUS; SUBCUTANEOUS NIGHTLY
Status: DISCONTINUED | OUTPATIENT
Start: 2022-09-07 | End: 2022-09-16 | Stop reason: HOSPADM

## 2022-09-07 RX ORDER — DEXTROSE MONOHYDRATE 100 MG/ML
INJECTION, SOLUTION INTRAVENOUS CONTINUOUS PRN
Status: CANCELLED | OUTPATIENT
Start: 2022-09-07

## 2022-09-07 RX ORDER — SODIUM CHLORIDE 0.9 % (FLUSH) 0.9 %
10 SYRINGE (ML) INJECTION AS NEEDED
Status: CANCELLED | OUTPATIENT
Start: 2022-09-07

## 2022-09-07 RX ORDER — CYCLOBENZAPRINE HCL 10 MG
5 TABLET ORAL 2 TIMES DAILY
Status: DISCONTINUED | OUTPATIENT
Start: 2022-09-07 | End: 2022-09-07 | Stop reason: HOSPADM

## 2022-09-07 RX ORDER — NAPROXEN 250 MG/1
500 TABLET ORAL 2 TIMES DAILY WITH MEALS
Status: CANCELLED | OUTPATIENT
Start: 2022-09-07

## 2022-09-07 RX ORDER — LIDOCAINE 4 G/G
1 PATCH TOPICAL DAILY
Status: CANCELLED | OUTPATIENT
Start: 2022-09-08

## 2022-09-07 RX ORDER — BISACODYL 10 MG
10 SUPPOSITORY, RECTAL RECTAL DAILY PRN
Status: CANCELLED | OUTPATIENT
Start: 2022-09-07

## 2022-09-07 RX ORDER — CARVEDILOL 3.12 MG/1
6.25 TABLET ORAL 2 TIMES DAILY WITH MEALS
Status: DISCONTINUED | OUTPATIENT
Start: 2022-09-08 | End: 2022-09-16 | Stop reason: HOSPADM

## 2022-09-07 RX ORDER — ALBUTEROL SULFATE 90 UG/1
2 AEROSOL, METERED RESPIRATORY (INHALATION) 2 TIMES DAILY
Status: CANCELLED | OUTPATIENT
Start: 2022-09-07

## 2022-09-07 RX ORDER — LATANOPROST 50 UG/ML
1 SOLUTION/ DROPS OPHTHALMIC NIGHTLY
Status: CANCELLED | OUTPATIENT
Start: 2022-09-07

## 2022-09-07 RX ORDER — INSULIN LISPRO 100 [IU]/ML
0-16 INJECTION, SOLUTION INTRAVENOUS; SUBCUTANEOUS
Status: DISCONTINUED | OUTPATIENT
Start: 2022-09-08 | End: 2022-09-16 | Stop reason: HOSPADM

## 2022-09-07 RX ORDER — MONTELUKAST SODIUM 10 MG/1
10 TABLET ORAL NIGHTLY
Status: CANCELLED | OUTPATIENT
Start: 2022-09-07

## 2022-09-07 RX ORDER — SENNA AND DOCUSATE SODIUM 50; 8.6 MG/1; MG/1
2 TABLET, FILM COATED ORAL DAILY
Status: CANCELLED | OUTPATIENT
Start: 2022-09-08

## 2022-09-07 RX ORDER — GABAPENTIN 300 MG/1
300 CAPSULE ORAL 3 TIMES DAILY
Status: CANCELLED | OUTPATIENT
Start: 2022-09-07

## 2022-09-07 RX ORDER — TRAMADOL HYDROCHLORIDE 50 MG/1
50 TABLET ORAL EVERY 6 HOURS PRN
Status: CANCELLED | OUTPATIENT
Start: 2022-09-07

## 2022-09-07 RX ADMIN — ACETAMINOPHEN 650 MG: 325 TABLET ORAL at 16:06

## 2022-09-07 RX ADMIN — CALCITONIN SALMON 1 SPRAY: 200 SPRAY, METERED NASAL at 08:20

## 2022-09-07 RX ADMIN — ANTI-FUNGAL POWDER MICONAZOLE NITRATE TALC FREE: 1.42 POWDER TOPICAL at 22:12

## 2022-09-07 RX ADMIN — GABAPENTIN 300 MG: 300 CAPSULE ORAL at 08:09

## 2022-09-07 RX ADMIN — ACETAMINOPHEN 650 MG: 325 TABLET ORAL at 08:07

## 2022-09-07 RX ADMIN — GUAIFENESIN 1200 MG: 600 TABLET ORAL at 21:00

## 2022-09-07 RX ADMIN — TRAMADOL HYDROCHLORIDE 50 MG: 50 TABLET, COATED ORAL at 16:10

## 2022-09-07 RX ADMIN — METFORMIN HYDROCHLORIDE 500 MG: 500 TABLET ORAL at 08:11

## 2022-09-07 RX ADMIN — FLUTICASONE PROPIONATE 1 SPRAY: 50 SPRAY, METERED NASAL at 08:19

## 2022-09-07 RX ADMIN — CLOPIDOGREL BISULFATE 75 MG: 75 TABLET ORAL at 08:11

## 2022-09-07 RX ADMIN — ACETAMINOPHEN 650 MG: 325 TABLET ORAL at 21:00

## 2022-09-07 RX ADMIN — SODIUM CHLORIDE, PRESERVATIVE FREE 10 ML: 5 INJECTION INTRAVENOUS at 08:22

## 2022-09-07 RX ADMIN — MOMETASONE FUROATE AND FORMOTEROL FUMARATE DIHYDRATE 2 PUFF: 100; 5 AEROSOL RESPIRATORY (INHALATION) at 17:27

## 2022-09-07 RX ADMIN — LATANOPROST 1 DROP: 50 SOLUTION OPHTHALMIC at 21:01

## 2022-09-07 RX ADMIN — APIXABAN 5 MG: 5 TABLET, FILM COATED ORAL at 21:00

## 2022-09-07 RX ADMIN — GLIPIZIDE 5 MG: 5 TABLET ORAL at 08:12

## 2022-09-07 RX ADMIN — GUAIFENESIN 1200 MG: 600 TABLET ORAL at 08:09

## 2022-09-07 RX ADMIN — SODIUM CHLORIDE, PRESERVATIVE FREE 10 ML: 5 INJECTION INTRAVENOUS at 21:48

## 2022-09-07 RX ADMIN — ALBUTEROL SULFATE 2 PUFF: 90 AEROSOL, METERED RESPIRATORY (INHALATION) at 17:27

## 2022-09-07 RX ADMIN — TRAZODONE HYDROCHLORIDE 25 MG: 50 TABLET ORAL at 21:00

## 2022-09-07 RX ADMIN — MONTELUKAST 10 MG: 10 TABLET, FILM COATED ORAL at 21:01

## 2022-09-07 RX ADMIN — GABAPENTIN 300 MG: 300 CAPSULE ORAL at 21:01

## 2022-09-07 RX ADMIN — FERROUS GLUCONATE 324 MG: 324 TABLET ORAL at 08:10

## 2022-09-07 RX ADMIN — NAPROXEN 500 MG: 250 TABLET ORAL at 17:18

## 2022-09-07 RX ADMIN — Medication 6 MG: at 21:00

## 2022-09-07 RX ADMIN — METFORMIN HYDROCHLORIDE 500 MG: 500 TABLET ORAL at 17:19

## 2022-09-07 RX ADMIN — ANTI-FUNGAL POWDER MICONAZOLE NITRATE TALC FREE: 1.42 POWDER TOPICAL at 08:21

## 2022-09-07 RX ADMIN — VANCOMYCIN HYDROCHLORIDE 1250 MG: 100 INJECTION, POWDER, LYOPHILIZED, FOR SOLUTION INTRAVENOUS at 09:43

## 2022-09-07 RX ADMIN — PANTOPRAZOLE SODIUM 40 MG: 40 TABLET, DELAYED RELEASE ORAL at 16:08

## 2022-09-07 RX ADMIN — FUROSEMIDE 40 MG: 40 TABLET ORAL at 08:12

## 2022-09-07 RX ADMIN — CARVEDILOL 6.25 MG: 6.25 TABLET, FILM COATED ORAL at 08:13

## 2022-09-07 RX ADMIN — ALBUTEROL SULFATE 2 PUFF: 90 AEROSOL, METERED RESPIRATORY (INHALATION) at 05:52

## 2022-09-07 RX ADMIN — APIXABAN 5 MG: 5 TABLET, FILM COATED ORAL at 08:10

## 2022-09-07 RX ADMIN — MOMETASONE FUROATE AND FORMOTEROL FUMARATE DIHYDRATE 2 PUFF: 100; 5 AEROSOL RESPIRATORY (INHALATION) at 05:52

## 2022-09-07 RX ADMIN — NAPROXEN 500 MG: 250 TABLET ORAL at 08:11

## 2022-09-07 RX ADMIN — ATORVASTATIN CALCIUM 20 MG: 20 TABLET, FILM COATED ORAL at 21:01

## 2022-09-07 RX ADMIN — AMIODARONE HYDROCHLORIDE 200 MG: 200 TABLET ORAL at 08:10

## 2022-09-07 RX ADMIN — PANTOPRAZOLE SODIUM 40 MG: 40 TABLET, DELAYED RELEASE ORAL at 06:24

## 2022-09-07 RX ADMIN — CARVEDILOL 6.25 MG: 6.25 TABLET, FILM COATED ORAL at 17:19

## 2022-09-07 RX ADMIN — GABAPENTIN 300 MG: 300 CAPSULE ORAL at 16:07

## 2022-09-07 ASSESSMENT — PAIN SCALES - GENERAL
PAINLEVEL_OUTOF10: 4
PAINLEVEL_OUTOF10: 2
PAINLEVEL_OUTOF10: 4
PAINLEVEL_OUTOF10: 2
PAINLEVEL_OUTOF10: 0
PAINLEVEL_OUTOF10: 2
PAINLEVEL_OUTOF10: 0
PAINLEVEL_OUTOF10: 4
PAINLEVEL_OUTOF10: 0

## 2022-09-07 ASSESSMENT — PAIN SCALES - WONG BAKER: WONGBAKER_NUMERICALRESPONSE: 2

## 2022-09-07 ASSESSMENT — PAIN DESCRIPTION - LOCATION
LOCATION: BACK
LOCATION: BACK
LOCATION: ABDOMEN
LOCATION: HIP
LOCATION: ABDOMEN

## 2022-09-07 ASSESSMENT — PAIN DESCRIPTION - DESCRIPTORS
DESCRIPTORS: ACHING

## 2022-09-07 ASSESSMENT — PAIN DESCRIPTION - ORIENTATION
ORIENTATION: LEFT;RIGHT
ORIENTATION: POSTERIOR;MID
ORIENTATION: POSTERIOR
ORIENTATION: ANTERIOR;LEFT;RIGHT;MID
ORIENTATION: ANTERIOR

## 2022-09-07 ASSESSMENT — PAIN - FUNCTIONAL ASSESSMENT: PAIN_FUNCTIONAL_ASSESSMENT: PREVENTS OR INTERFERES WITH ALL ACTIVE AND SOME PASSIVE ACTIVITIES

## 2022-09-07 NOTE — DISCHARGE SUMMARY
[facility]  Jaky Mena MD  Medical Director  [place], [address] [facility]  Tel: [phone]    PHYSICAL MEDICINE & REHABILITATION DISCHARGE SUMMARY     Date: 9/7/2022  Admission Date: 8/26/2022  Discharge Date: 9/7/2022    Surgeon: Tiffany Vora MD  Primary Care Provider: Ciera Byrne MD    Admission Condition: good  Discharged Condition: good    Admitting Diagnosis:   Physical debility [R53.81]     Principal Problem:    Physical debility  Resolved Problems:    * No resolved hospital problems. *  ICM with ICD/PPM EF 30-35%  Compression fxs s/p kyphoplasty  Electrolye dysfxn  Acute encephalopathy   Afib on eliquis  ICM/EF 30% on chronic CHF  DM2  COPD  E coli UTI  MINI   SHERI on CPAP  COPD  PAD  Anemia  Recent kyphoplasty L2-4  Acute hypoxic respiratory failure  Subsequent diskitis and psoas muscle abscess      Hospital Course: The patient was admitted to [address] [place] [address] by Jaky Mena MD on 8/26/2022 for physical debility following prolonged hospitalization. HPI: This patient is a r-hand dominant previously functionally independent 64 yo male with a PMH of ICM and ICD /PPM and EF of 30-35%, afib on chronic OAC, DM2, PAD, anemia, COPD, CAD, and SHERI on CPAP who fell in the beginning of May while planting tomato plants. He sustained lumbar compression fxs and underwent L2-4 kyphoplaties by Dr Rosa Maria Burciaga in June. Post op he developed worsening weakness and had several falls. CT showed post op changes wth no acute fx and multiple levels of spinal canal stenosis. He was diagnosed with a UTI at the time. MRI of the brain was normal. He then had an MRI of the lumbar spine and cervical spine . 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 CRP was elevated at 12.4. He had noted OM of the lower spine. His oac was held and he was placed on a heparin gtt.  He was placed on Vanc and Ceftriaxone after IR drained the abscess. He was followed by ID and treated for 6 weeks with vanc and rocephin. He developed AMS. This was thought to be due to metabolic etiology. He has had increasing complaints of back pain and intermittent confusion. He was transferred to [facility] for ongoing therapy and abx needs. Cardiology was consulted and ICD was interrogated and found to be functioning well. A hoang was placed due to urinary retention. Neurology was consulted. He had a complete work up for  AMS. . It was deemed that he had acute encephalopathy due to meds, especially  opiates and antipsychotics. He has been weaning off zyprexa. The pt will continue on Iv abx fpr a total of 6  weeke. Eot 9/9/2022    REHABILITATION COURSE:      Physical debility s/p lumbar osteomyelitis and psoas abscess      Plan / Recommendations / Medical Decision Making:      Continue daily physician / PA medical management:     Physical debility [R53.81] - PT / OT daily, 3h/d at least 5d/week. I have no doubt that pt can actively participate and tolerate as well as benefit from aggressive rehab. -9/5 motivated but pain keeps him from progressing, may need skilled placement     Hypo/HTN - BP fluctuating, managed medically. Continue amlodipine 5mg daily, carvedilol 25mg BID, losartan 100mg daily and furosemide 40mg daily.   -8/28 /68 this AM, yesterday asymptomatic LOW BP 93/58, 103/52; will hold amlodipine, losartan; continue to monitor, adjust as needed  -8/29 /73 before AM meds, recheck before lunch; consider adding back amlodipine or losartan at 1/2- or 1/4-strength if needed  -8/30 symptomatic OH this AM before meds; /55, decrease carvedilol to 6.25mg BID, continue holding amlodipine, losartan  -8/31 /69 before AM meds, 118/66 after  -9/1 /55  -9/2 /59; current meds: carvedilol 6.25mg BID, furosemide 40mg daily  -9/5 VSS  -9/6 /70 before AM meds     Infectious disease - lumbar spine epidural abscess, psoas abscess, osteomyelitis of lower spine. Continue ceftriaxone and vancomycin through 9/9, already completed 6wks of ABX. (Abscess never cx'd)  -8/28 WBC 6.5  -9/2 WBC 9.0, patient remains afebrile  -9/5 4 more days of ABX; MRI tomorrow   -9/7 MRI; There appears to be worsening signs of Osteomyelitis and psoas m abscess. There is compression of the cauda equina nerve roots at multiple levels in the lumbar spine as before. Multilevel DDD and facet arthropathy. Bilateral psoas muscle myositis with a new left psoas muscle abscess of the level of L 3-L4  -in light of a new abscess, ID was reconsulted. Dr Ede Daniels recommended stopping abx and getting IR involved to aspirate new abscess for culture. CRP 4.8 , sed rate nl  -seen by Dr Elen Lim; no surgical role at this time and that his degenerative stenosis can be addressed once he is infection free.  -\"It was determined today in team conference that with patient's worsening pain, worsening medical condition (new psoas abscess in addition to unresolved prior psoas abscesses) and inability to participate in 3h intensive therapy daily, he is best served by return to acute care. Hospitalist admission coordinator Connie Lopez messaged via [employer]. Dr. Shelton Brooms to explain circumstances to patient / wife. \" Both pt and wife appreciative of care in interest in finding him relief. Pain management - ongoing significant pain in back and paresthesic neuropathic pain requiring PRN pain meds. Will require regular pain assessment and comprehensive pain management. Had AMS with numerous pain medications post-operatively in acute and at [facility].  Will add gabapentin 100mg TID.  -8/28 continue scheduled APAP TID, naproxen BID, lidocaine patch  -8/29 unable to participate in ADLs earlier this AM due to pain; Physiatrist restarted oxycodone 5mg IR at 07h30, and by ~11h15, patient able to ambulate with PT   -8/31 increase gabapentin to 200mg TID; patient with initial good response to gabapentin  -9/1 reminded patient he has oxycodone IR for LS pain, has used only 2 doses since 8/29; he admits to aversion to narcotics, concerned about addiction  -9/2 doing well on gabapentin 200mg TID  -9/3 pain is quite debilitating and could not stand or attempt to walk; he must take the narcotics; will add tramadol; will increase gabapentin to 300mg TID; needs MRI of lumbar spine; no f/u MRI post kyphoplasty with known central canal stenosis  -9//5 pain remains the limiting factor; tolerating increased dose of gabapentin; due to holiday, MRI ordered Sunday cannot be performed until tomorrow, 9/6     Diabetes mellitus - HgbA1c 6.0% (8/10/2022), was 5.5% in May prior to kyphoplasty. Continue Lantus 14u at bedtime, Humalog SSI. Adjust as needed. Per EMR, prior to admission he was on metformin 500mg BID, Januvia 100mg daily, glipizide XL 10mg daily.   -8/28  this AM, yesterday 127, 209, 158, 151  -8/29  this AM; ranged 125-195 yesterday  -8/30  this AM, ranged 138-280 yesterday; resume home dose metformin 500mg BID (Cr / BUN normal 8/28)  -8/31  this AM, ranged 140-244 yesterday; resume glipizide (not XL) tomorrow but at 2.5mg strength  -9/1  this AM; observe today's values for effect from glipizide started this AM  -9/2  this AM, ranged 109-187 (better) yesterday after 1st dose glipizide 2.5mg; room for improvement, will increase to 5mg starting tomorrow  -9/3  this AM, 111 at bedtime, 87 at supper, 152 at lunch and 136 yesterday AM; lunch today 193; increasing glipizide to 5mg daily, continue metformin 500mg BID and Lantus 14u at bedtime  -9/5  this AM, last bedtime 131, supper 164, lunch 154; continue current schedule  -9/6  this AM; yesterday's values from AM to PM: 160, 191, 101, 134; no medication change     Electrolyte abnormality - hypokalemia, K+ 3.4 (8/25); replace and monitor.   -8/28 K+ 3.7  -9/2 normal BMP this AM  -9/6 normal BMP again this AM Hyperlipidemia - continue daily Lipitor 20mg. Cardiomyopathy, CAD, history of Vtach - s/p ICD; compensated. Continue Plavix 75mg daily. Atrial fibrillation - continue amiodarone 200mg daily, Eliquis 5mg BID. Anemia - Hgb 8.8 on 8/22. History of normocytic anemia going back ~8y in EMR. Monitor.  -8/28 Hgb 7.9, recheck CBC in AM  -8/29 Hgb 7.8, drifting downward; consider PO iron  -9/2 Hgb 8.3, iron studies with low iron / TIBC / transferrin; add PO iron with breakfast  9/6 Hgb 8.4, low but stable     COPD - continue Dulera, Singulair and Mucinex; well compensated. 8/30, coarse breath sounds, upper airway wheeze - CXR with early pulmonary edema. Add one-time dose bumetanide 2mg now and continue daily furosemide 40mg.  -9/1 clear breath sounds, no pedal edema  -9/2 increased O2 needs with CPAP overnight, rhonchi: repeat CXR this AM  -9/3 CXR with no change, down to using 2L O2  -9/5 on RA during the day, 2L bled in to CPAP at night  -9/6 more rhonchi, airway congestion this AM; stable cough, denies F/C, dyspnea; needs neb treatment from respiratory     Pneumonia prophylaxis - incentive spirometer every hour while awake. DVT risk / DVT prophylaxis - daily physician / PA exam to assess as patient is at increased risk for of thromboembolism. Mobilize as tolerated. Sequential pneumatic compression devices (SCDs) when in bed; thigh-high or knee-high thromboembolic deterrent hose when out of bed. On Eliquis. GI prophylaxis - resume PPI. At times may need additional antacids, Maalox prn. History of tobacco use - stress abstinence, education. Reportedly quit 15y ago but continues with chewing tobacco.     General skin care / wound prevention - monitor general skin wound status daily per staff and physician / PA. At risk for failure due to impaired mobility. Bladder program / urinary retention / neurogenic bladder - schedule voids q6-8h.  Check post-void residual as needed; in-and-out catheter if post-void residual is more than 400ml. Bowel program - at risk for constipation as a side effect of opioids, other medications, impaired mobility, etc. MiraLAX daily for regularity, Joanna-Colace for stool softener. PRN MOM, bisacodyl suppository or tablets for constipation. -8/30 c/o constipation, normally uses Fleets enema at home but declines that here; no rectal pressure, just mid-belly fullness; bisacodyl 10mg PO now  -8/31 no BM with bisacodyl PO, pharmacy does not have mag citrate; MOM 30ml now  -9/1 large BM x 2 after MOM yesterday  -9/3 states his bowels have improved     Sleep disruption, 8/29 - no relief from scheduled Vistaril 25mg at bedtime. Does not require medications for sleep at home. Start low-dose trazodone cautiously, as patient [facility].   -8/30 slept well on trazodone, denies hangover effect this AM  -8/31 sleeping well, continue trazodone          FUNCTIONAL LEVEL ON ADMISSION:  PT; bed mobility sup/SBA, transfer CGA. ;limited by pelvis and low back pain, STS CGA, ambulate 12 ft x 2 with Rw and CGA    FUNCTIONAL LEVEL AT DISCHARGE:  OT DAILY NOTE     Time In: 920     Time Out: 959       Score Comments   Rolling Substantial/maximal assistance Significant A   Supine to Sit Substantial/maximal assistance Significant A   Sit to Supine Dependent Ax2   Sit to Stand Dependent Ax2 for safety   Transfer Assist Dependent Ax2 for safety         Eating Current Functional Level   Score    Comments       Oral Hygiene  Current Functional Level   Score Supervision or touching assistance   Comments Cueing      Bathing Current Functional Level   Score Dependent   Comments Ax2 to wash buttocks      Upper Body   Dressing Current Functional Level   Score Partial/moderate assistance   Comments A to pull down in the back      Lower Body   Dressing Current Functional Level      Functional Level Dependent   Comments Ax2      Donning/Everman  Footwear Current Functional Level      Functional Level Dependent   Comments A for all parts      Summary of Session: Pt was in bed and agreeable to tx. Pt's performance with ADL is reflected in above chart. Pt did better than yesterday, but still worse than on eval. Attempted some core strengthening exercises with poor performance. Pt is having more spasms. Pt was left in w/c with all needs within reach. Gray Champagne, OT  9/6/2022                                                                                                                                                                Physical Therapy  PHYSICAL THERAPY DAILY NOTE  Time In:  4058 PM  Time Out:  1521 PM  Total Treatment Time:  (P) 49 Minutes  Pt. Seen for: PM, Therapeutic Exercise, and Transfer Training      Subjective: \" My low back just hurts every time I move. \"            Objective:  Precautions: Poor Safety Awareness     GROSS ASSESSMENT Daily Assessment               COGNITION Daily Assessment     impaired         BED/MAT MOBILITY Daily Assessment     Rolling Right: Max A  Rolling Left: Max A  Supine to Sit: Max A  Sit to Supine: Max A         TRANSFERS Daily Assessment     Sit to Stand: Max A  Stand to Sit: Max A  Transfer Type: Lateral Pivot  Transfer Assistance: Max A  Car Transfers: NT            GAIT Daily Assessment     Amount of Assistance:   Distance (ft):   Assistive Device:   Surface:          STEPS/STAIRS Daily Assessment     Steps Ambulated:    Level of Assistance:    Railing:  Assistive Device:          BALANCE Daily Assessment     Static Sitting:   Dynamic Sitting:   Static Standing:   Dynamic Standing:          WHEELCHAIR MOBILITY Daily Assessment     Able to Propel (ft):   Assistance:   Surface:   Wheelchair Set-up:          LOWER EXTREMITY EXERCISES Daily Assessment     Rode motomed x 10 minutes. .    SEATED EXERCISES Sets Reps Comments   Ankle Pumps 1 10     Hip Flexion 1 10     Long Arc Quads 1 10     Hip Adduction/Ball Squeeze 1 10 Pain level: Pain not rated but was unable to stand with assistance secondary to pain. Pain is described as a muscle spasm and you can tell he is in pain and clinching his whole body when this occurs. It looks like he is having muscle spasm. Pain Location:  low back  Pain Interventions:   Education:       Interdisciplinary Communication: Discussed with nurse Trupti Chavira concerning to have 2 people with transfer back to bed. .     Pt. Left in wheelchair where patient requested with call bell at reach. Assessment: Patient varies on how much assistance he requires but pain has increased . Plan of Care: Continue with plan of care. Kar Rios, PTA  9/6/2022            HOME ARCHITECTURE:  Lives with wife,Prior to 5/5/2022 pt. Was independent w/o A/D. However, s/p fall on 5/5/2022 pt. Has experienced a sharp decline in mobility & has not been ambulatory since kyphoplasty on 6/19/2022. Pt. Has had multiple falls over the last 3-4 months. Lives in a home with 2 steps to enter.  Current DME; RW, Rollator, wc, cane and bsc       Past Medical History:   Diagnosis Date    Acute respiratory failure with hypoxia ([address] 10/23/2014    MINI (acute kidney injury) ([address] 09/15/2014    MINI (acute kidney injury) ([address]     MINI (acute kidney injury) ([address]     Allergic rhinitis 11/10/2015    Asthma 11/10/2015    Benign essential hypertension 11/10/2015    Benign neoplasm of colon 11/10/2015    CAD (coronary artery disease) 11/10/2015    CAD (coronary artery disease) 1998, 1999    mix2, 3 stents [id number]    CAP (community acquired pneumonia) 12/31/2020    Cardiomyopathy ([address] 37/09/9581    Complicated wound infection 11/10/2015    COPD (chronic obstructive pulmonary disease) with emphysema ([address] 11/10/2015    COPD exacerbation ([address] 10/12/2011    COVID-19 12/31/2020    Diabetes mellitus type 2, controlled ([address] 11/10/2015    Diabetic neuropathy ([address] 11/10/2015    Disruption of wound, Father         DIVERTICULITIS    Crohn's Disease Sister     Lung Disease Brother         mesothioloma    Diabetes Sister     Heart Attack Father     Heart Disease Father     Stroke Mother       Social History     Tobacco Use    Smoking status: Former     Packs/day: 1.00     Types: Cigarettes     Quit date: 10/2/2011     Years since quitting: 10.9    Smokeless tobacco: Current   Substance Use Topics    Alcohol use: Yes     Alcohol/week: 0.0 standard drinks     Allergies   Allergen Reactions    Azithromycin Hives    Oxaprozin Other (See Comments)     ELEVATED BLOOD PRESSURE      Prior to Admission medications    Medication Sig Start Date End Date Taking? Authorizing Provider   furosemide (LASIX) 20 MG tablet Take 1 tablet by mouth in the morning. 8/9/22   Virginia Lesches, MD   tamsulosin (FLOMAX) 0.4 MG capsule Take 1 capsule by mouth in the morning. 8/9/22   Virginia Lesches, MD   pantoprazole (PROTONIX) 40 MG tablet Take 1 tablet by mouth in the morning and 1 tablet in the evening. Take before meals. 8/8/22   Virginia Lesches, MD   amLODIPine (NORVASC) 5 MG tablet Take 1 tablet by mouth in the morning. 8/9/22   Virginia Lesches, MD   OLANZapine zydis (ZYPREXA) 5 MG disintegrating tablet Take 1 tablet by mouth nightly 8/8/22   Virginia Lesches, MD   QUEtiapine (SEROQUEL) 25 MG tablet Take 1 tablet by mouth in the morning.  8/9/22   Virginia Lesches, MD   rosuvastatin (CRESTOR) 10 MG tablet TAKE 1 TABLET BY MOUTH EVERY DAY 7/29/22   Lupe Anderson MD   tiZANidine (ZANAFLEX) 2 MG tablet Take 1 tablet by mouth every 8 hours as needed (muscle spasms) 7/25/22   Lupe Anderson MD   fluticasone-salmeterol (ADVAIR) 250-50 MCG/ACT AEPB diskus inhaler Inhale 1 puff into the lungs in the morning and at bedtime 6/30/22   Historical Provider, MD   calcitonin (MIACALCIN) 200 UNIT/ACT nasal spray 1 spray by Nasal route daily 6/3/22   AARON Sesay - DARIO   albuterol sulfate  (90 Base) MCG/ACT inhaler USE 2 PUFFS BY INHALATION 4 TIMES DAILY AS NEEDED FOR WHEEZING OR SHORTNESS OF BREATH. 1/14/22   Ar Automatic Reconciliation   amiodarone (CORDARONE) 200 MG tablet Take 200 mg by mouth 2 times daily 3/10/22   Ar Automatic Reconciliation   apixaban (ELIQUIS) 5 MG TABS tablet Take 5 mg by mouth every 12 hours 3/10/22   Ar Automatic Reconciliation   ascorbic acid (VITAMIN C) 500 MG tablet Take 500 mg by mouth    Ar Automatic Reconciliation   carvedilol (COREG) 25 MG tablet Take 25 mg by mouth 2 times daily (with meals) 3/10/22   Ar Automatic Reconciliation   Cholecalciferol 50 MCG (2000 UT) TABS Take 2,000 Units by mouth    Ar Automatic Reconciliation   clopidogrel (PLAVIX) 75 MG tablet Take 75 mg by mouth daily 3/11/22   Ar Automatic Reconciliation   fluticasone (FLONASE) 50 MCG/ACT nasal spray USE 1 OR 2 SPRAYS IN EACH NOSTRIL EVERY DAY. 3/15/22   Ar Automatic Reconciliation   glipiZIDE (GLUCOTROL XL) 10 MG extended release tablet TAKE 1 TABLET BY MOUTH TWICE DAILY 1/14/22   Ar Automatic Reconciliation   glucosamine-chondroitin 750-600 MG TABS tablet Take by mouth 2 times daily    Ar Automatic Reconciliation   guaiFENesin 1200 MG TB12 Take 1,200 mg by mouth 2 times daily as needed    Ar Automatic Reconciliation   latanoprost (XALATAN) 0.005 % ophthalmic solution Apply 1 drop to eye    Ar Automatic Reconciliation   magnesium oxide (MAG-OX) 400 (240 Mg) MG tablet TAKE 1 TABLET BY MOUTH EVERY DAY 1/14/22   Ar Automatic Reconciliation   montelukast (SINGULAIR) 10 MG tablet TAKE 1 TABLET BY MOUTH EVERY DAY AT BEDTIME 3/15/22   Ar Automatic Reconciliation   nitroGLYCERIN (NITROSTAT) 0.4 MG SL tablet Place 0.4 mg under the tongue 3/11/22   Ar Automatic Reconciliation   polyethylene glycol (GLYCOLAX) 17 GM/SCOOP powder Take 17 g by mouth daily 9/21/21   Ar Automatic Reconciliation   SITagliptin (JANUVIA) 100 MG tablet TAKE 1 TABLET BY MOUTH EVERY DAY 3/15/22   Ar Automatic Reconciliation   valsartan (DIOVAN) 320 MG tablet Take 320 mg by mouth daily 3/10/22   Ar Automatic Reconciliation   metFORMIN (GLUCOPHAGE) 500 MG tablet TAKE TWO TABLETS BY MOUTH 2 TIMES A DAY 3/23/22 8/8/22  Ar Automatic Reconciliation       Lab Review:   Recent Results (from the past 67 hour(s))   POCT Glucose    Collection Time: 09/04/22  4:04 PM   Result Value Ref Range    POC Glucose 164 (H) 65 - 100 mg/dL    Performed by: Loy    POCT Glucose    Collection Time: 09/04/22  9:05 PM   Result Value Ref Range    POC Glucose 131 (H) 65 - 100 mg/dL    Performed by: [facility] AT [place]    POCT Glucose    Collection Time: 09/05/22  6:17 AM   Result Value Ref Range    POC Glucose 160 (H) 65 - 100 mg/dL    Performed by: Pam    POCT Glucose    Collection Time: 09/05/22 11:24 AM   Result Value Ref Range    POC Glucose 191 (H) 65 - 100 mg/dL    Performed by: [place] ([place]    POCT Glucose    Collection Time: 09/05/22  4:23 PM   Result Value Ref Range    POC Glucose 101 (H) 65 - 100 mg/dL    Performed by: Hortencia)CNA    POCT Glucose    Collection Time: 09/05/22  8:58 PM   Result Value Ref Range    POC Glucose 134 (H) 65 - 100 mg/dL    Performed by: [facility]    Comprehensive Metabolic Panel w/ Reflex to MG    Collection Time: 09/06/22  4:15 AM   Result Value Ref Range    Sodium 142 136 - 145 mmol/L    Potassium 3.7 3.5 - 5.1 mmol/L    Chloride 106 101 - 110 mmol/L    CO2 31 21 - 32 mmol/L    Anion Gap 5 4 - 13 mmol/L    Glucose 93 65 - 100 mg/dL    BUN 15 8 - 23 MG/DL    Creatinine 0.80 0.8 - 1.5 MG/DL    GFR African American >60 >60 ml/min/1.73m2    GFR Non- >60 >60 ml/min/1.73m2    Calcium 8.4 8.3 - 10.4 MG/DL    Total Bilirubin 0.4 0.2 - 1.1 MG/DL    ALT 45 12 - 65 U/L    AST 24 15 - 37 U/L    Alk Phosphatase 91 50 - 136 U/L    Total Protein 6.0 (L) 6.3 - 8.2 g/dL    Albumin 2.4 (L) 3.2 - 4.6 g/dL    Globulin 3.6 (H) 2.3 - 3.5 g/dL    Albumin/Globulin Ratio 0.7 (L) 1.2 - 3.5     CBC    Collection Time: 09/06/22  4:15 AM   Result Value Ref Range    WBC 7.1 4.3 - 11.1 K/uL    RBC 2.74 (L) 4.23 - 5.6 M/uL    Hemoglobin 8.4 (L) 13.6 - 17.2 g/dL    Hematocrit 26.5 (L) 41.1 - 50.3 %    MCV 96.7 79.6 - 97.8 FL    MCH 30.7 26.1 - 32.9 PG    MCHC 31.7 31.4 - 35.0 g/dL    RDW 16.7 (H) 11.9 - 14.6 %    Platelets 965 875 - 362 K/uL    MPV 10.4 9.4 - 12.3 FL    nRBC 0.00 0.0 - 0.2 K/uL   POCT Glucose    Collection Time: 09/06/22  6:22 AM   Result Value Ref Range    POC Glucose 103 (H) 65 - 100 mg/dL    Performed by:  Tamiko    POCT Glucose    Collection Time: 09/06/22 11:11 AM   Result Value Ref Range    POC Glucose 113 (H) 65 - 100 mg/dL    Performed by: Bita    POCT Glucose    Collection Time: 09/06/22  4:08 PM   Result Value Ref Range    POC Glucose 107 (H) 65 - 100 mg/dL    Performed by: Bita    POCT Glucose    Collection Time: 09/06/22  9:07 PM   Result Value Ref Range    POC Glucose 146 (H) 65 - 100 mg/dL    Performed by: Marion    POCT Glucose    Collection Time: 09/07/22  6:43 AM   Result Value Ref Range    POC Glucose 100 65 - 100 mg/dL    Performed by: Marion    Procalcitonin    Collection Time: 09/07/22  9:15 AM   Result Value Ref Range    Procalcitonin <0.05 0.00 - 0.49 ng/mL   Vancomycin Level, Trough    Collection Time: 09/07/22  9:15 AM   Result Value Ref Range    Vancomycin Tr 12.2 5 - 20 ug/mL   C-Reactive Protein    Collection Time: 09/07/22  9:15 AM   Result Value Ref Range    CRP 4.8 (H) 0.0 - 0.9 mg/dL   Sedimentation Rate    Collection Time: 09/07/22 11:01 AM   Result Value Ref Range    Sed Rate, Automated 4 (L) 15 mm/hr   POCT Glucose    Collection Time: 09/07/22 11:17 AM   Result Value Ref Range    POC Glucose 98 65 - 100 mg/dL    [facility]formed by: [facility]        Visit Vitals  /67   Pulse 65   Temp 98.1 °F (36.7 °C) (Oral)   Resp 17   Ht 6' 1\" (1.854 m)   Wt 237 lb (107.5 kg)   SpO2 95%   BMI 31.27 kg/m²        Discharge Exam:  General: Alert, appropriately oriented. Lying in bed appearing quite uncomfortable   HEENT: Normocephalic, EOM intact. Oral mucosa moist.   Lungs: Coarse breath sounds bilaterally, Few expiratory wheezes  Respirations even and unlabored. Heart: Regular rate, mostly regular underlying rhythm. No appreciable murmur but heart sounds muffled. Abdomen: Soft, non-tender, obese and protuberant but not distended. Bowel sounds normoactive, no organomegaly. Genitourinary: Deferred. Neuromuscular: Follows commands. Dec conc/attn   Generalized weakness, no focal neuro deficits. Exam limited by anticipation of pain. Skin/extremity: No rashes, no erythema. Calves soft, non-tender B LE. No edema       Current Discharge Medication List        CONTINUE these medications which have NOT CHANGED    Details   furosemide (LASIX) 20 MG tablet Take 1 tablet by mouth in the morning. Qty: 60 tablet, Refills: 3      tamsulosin (FLOMAX) 0.4 MG capsule Take 1 capsule by mouth in the morning. Qty: 30 capsule, Refills: 3      pantoprazole (PROTONIX) 40 MG tablet Take 1 tablet by mouth in the morning and 1 tablet in the evening. Take before meals. Qty: 30 tablet, Refills: 3      amLODIPine (NORVASC) 5 MG tablet Take 1 tablet by mouth in the morning. Qty: 30 tablet, Refills: 3      OLANZapine zydis (ZYPREXA) 5 MG disintegrating tablet Take 1 tablet by mouth nightly  Qty: 30 tablet, Refills: 3      QUEtiapine (SEROQUEL) 25 MG tablet Take 1 tablet by mouth in the morning.   Qty: 60 tablet, Refills: 3      rosuvastatin (CRESTOR) 10 MG tablet TAKE 1 TABLET BY MOUTH EVERY DAY  Qty: 90 tablet, Refills: 0    Comments: * * N O T I C E * * Last quantity doesn't match original quantity      tiZANidine (ZANAFLEX) 2 MG tablet Take 1 tablet by mouth every 8 hours as needed (muscle spasms)  Qty: 30 tablet, Refills: 1      fluticasone-salmeterol (ADVAIR) 250-50 MCG/ACT AEPB diskus inhaler Inhale 1 puff into the lungs in the morning and at bedtime      calcitonin (MIACALCIN) 200 UNIT/ACT nasal spray 1 spray by Nasal route daily  Qty: 1 each, Refills: 1    Associated Diagnoses: Compression fracture of L2 vertebra, sequela      albuterol sulfate  (90 Base) MCG/ACT inhaler USE 2 PUFFS BY INHALATION 4 TIMES DAILY AS NEEDED FOR WHEEZING OR SHORTNESS OF BREATH.      amiodarone (CORDARONE) 200 MG tablet Take 200 mg by mouth 2 times daily      apixaban (ELIQUIS) 5 MG TABS tablet Take 5 mg by mouth every 12 hours      ascorbic acid (VITAMIN C) 500 MG tablet Take 500 mg by mouth      carvedilol (COREG) 25 MG tablet Take 25 mg by mouth 2 times daily (with meals)      Cholecalciferol 50 MCG (2000 UT) TABS Take 2,000 Units by mouth      clopidogrel (PLAVIX) 75 MG tablet Take 75 mg by mouth daily      fluticasone (FLONASE) 50 MCG/ACT nasal spray USE 1 OR 2 SPRAYS IN EACH NOSTRIL EVERY DAY. glipiZIDE (GLUCOTROL XL) 10 MG extended release tablet TAKE 1 TABLET BY MOUTH TWICE DAILY      glucosamine-chondroitin 750-600 MG TABS tablet Take by mouth 2 times daily      guaiFENesin 1200 MG TB12 Take 1,200 mg by mouth 2 times daily as needed      latanoprost (XALATAN) 0.005 % ophthalmic solution Apply 1 drop to eye      magnesium oxide (MAG-OX) 400 (240 Mg) MG tablet TAKE 1 TABLET BY MOUTH EVERY DAY      montelukast (SINGULAIR) 10 MG tablet TAKE 1 TABLET BY MOUTH EVERY DAY AT BEDTIME      nitroGLYCERIN (NITROSTAT) 0.4 MG SL tablet Place 0.4 mg under the tongue      polyethylene glycol (GLYCOLAX) 17 GM/SCOOP powder Take 17 g by mouth daily      SITagliptin (JANUVIA) 100 MG tablet TAKE 1 TABLET BY MOUTH EVERY DAY      valsartan (DIOVAN) 320 MG tablet Take 320 mg by mouth daily           STOP taking these medications       metFORMIN (GLUCOPHAGE) 500 MG tablet Comments:   Reason for Stopping:                 DISPOSITION: return to acute care due to worsening psoas abscess and severe DDD and intractable pain      OUTPATIENT FOLLOW-UP:  No follow-up provider specified.        Time spent in patient discharge planning and coordination: >35 minutes.

## 2022-09-07 NOTE — CONSULTS
Patient seen and examined. Imaging studies reviewed. Case discussed with Dr. Jacob Mark. Agree with IR recommendations of IR consult for aspirate and possible percutaneous drain. No role for open surgery here. His degenerative stenosis can be addressed once he is infection free.

## 2022-09-07 NOTE — PROGRESS NOTES
SPEECH PATHOLOGY NOTE:    Attempted to see patient for cognitive tx this am.  Patient reported he did not feel well this am and declined. Will re-attempt at a later time.     Stephen Julio MS, CCC-SLP

## 2022-09-07 NOTE — H&P
Hospitalist History and Physical   Admit Date:  2022 12:19 PM   Name:  Yann Nascimento   Age:  65 y.o. Sex:  male  :  1945   MRN:  [id number]   Room:  [facility]    Presenting Complaint: No chief complaint on file. Reason(s) for Admission: Physical debility [R53.81]     History of Present Illness:   Yann Nascimento is a 65 y.o. male with a past medical history significant for ischemic cardiomyopathy with ICD/pacemaker, EF of 30 to 35%, A. fib on Eliquis, diabetes, obesity, peripheral artery disease anemia, COPD with asthma, coronary artery disease, obstructive sleep apnea on CPAP with recent L2 L4 kyphoplasty who was recently charged from [facility] 2022 to [facility] after being admitted for UTI worsening weakness status post surgery with several falls. MRI of the lumbar spine and cervical spine were done and he was found to have a lumbar spine epidural abscess and symptoms abscess. His CRP was elevated at 12.4. He had noted osteomyelitis of the lower spine. His oral anticoagulation was held and he was placed on a heparin drip. He was also placed on vancomycin and ceftriaxone after IR drain abscess. He was followed by infectious disease and treated for 6 weeks with Vanco and Rocephin. He developed altered mental status. This was thought to be due to metabolic cause. Unfortunately he started having increasing complaints of back pain confusion. He was transferred to [facility] for ongoing therapy and antibiotic needs. Cardiology was consulted and his ICD was interrogated and found to be functioning well. A Tomas was placed due to his urinary retention. Urology was consulted. He had a complete altered mental status work-up and it was thought he had acute encephalopathy secondary to especially opiates and antipsychotics.   He has been weaning off Zyprexa with plans to continue IV antibiotics for total of 6 weeks with estimated end of therapy 9/20/2022. He was transferred to inpatient rehab floor on August 26, 2022. He was continued on antibiotics he continued to have lower back pain and has been poorly participative in therapy over the last few days. MRI of the spine done today showed an apparent worsening osteomyelitis and psoas muscle abscess. It also showed compression of the cauda equina nerve roots at multiple levels in the lumbar spine as before. Multilevel degenerative disc disease and facet arthropathy. Bilateral psoas muscle myositis with new left psoas muscle abscess at the level of L3-L4. Because of the abscesses ID was danilotan. And recommended stopping all antibiotics at this time and getting IR to aspirate the abscess for culture likely on Monday of next week. Dr. Magdalena Sena was also consulted and felt no surgical role indicated at this time and that his degenerative stenosis can be addressed once he is infection free. As the patient is no longer able to participate in therapy it was thought best to transfer him back to the hospital.    Hospitalist service was consulted for admission. And will gladly assume care of this patient. Patient complains of bilateral lower extremities as 5 pain medications available. Also tried gabapentin with. Assessment & Plan:     Principal Problem:  Lumbar spine epidural abscess, psoas abscess, osteomyelitis of the lower spine. Complicated wound infection  9/7/2022  Initial plan was for continued antibiotics ceftriaxone and vancomycin.     Given the new abscess seen on CT with worsening osteomyelitis   Seen by ID today and will put him on an antibiotic holiday   With plans to have IR attempt to drain the abscess for culture on Monday     active Problems:       General weakness    Back pain  9/7/2022  Multifactorial secondary to abscess and osteomyelitis that worsened  Will continue supportive care  Continue pain management  Therapy services on hold at this time we will reinstate when able to        Anemia  9/7/2022  H&H stable      Ischemic cardiomyopathy   ICD (implantable cardioverter-defibrillator) in place    Atrial fibrillation (HCC)  HTN (hypertension)  9/7/2022    Continue appropriate meds        Pulmonary emphysema (HCC)  COPD (chronic obstructive pulmonary disease) ([address]   Obstructive sleep apnea  9/7/2022  Continue current management patient does not appear to be in any acute exacerbation at this time        Cigarette nicotine dependence in remission  Chewing tobacco dependence  9/7/2022  He continues to chew tobacco  Cessation advised  Will monitor        Diabetic neuropathy (HCC)  Chronic pain of right knee  9/7/2022    Continue gabapentin  Continue pain meds          PAD (peripheral artery disease) ([address]  9/7/2022  Continue appropriate home meds    Hyperlipidemia  9/7/2022  Continue appropriate meds    DM (diabetes mellitus) type II, controlled, with peripheral vascular disorder ([address]  9/7/2022  Monitor blood sugars and cover with insulin         MINI (acute kidney injury) ([address]  9/7/2022  Resolved  Avoid nephrotoxins  Dose medications for his renal function    Leg cramping and muscle spasms  9/7/2022  We will give him a trial of Flexeril      Anticipated discharge needs:   Likely back LTACh/ rehab    Diet: ADULT ORAL NUTRITION SUPPLEMENT; Breakfast, Dinner; Other Oral Supplement; Ensure Enlive; strawberry with breakfast, chocolate at supper. ADULT DIET;  Regular; 3 carb choices (45 gm/meal)  VTE ppx: Eliquis  Code status: Full Code    Hospital Problems:  Principal Problem:    Physical debility  Active Problems:    ICD (implantable cardioverter-defibrillator) in place    General weakness    Back pain    Anemia    Atrial fibrillation (HCC)    Complicated wound infection    HTN (hypertension)    Pulmonary emphysema (HCC)    COPD (chronic obstructive pulmonary disease) (HCC)    Cigarette nicotine dependence in remission    Diabetic neuropathy (HCC)    Chronic pain of right knee    Ischemic cardiomyopathy    PAD (peripheral artery disease) (HCC)    Hyperlipidemia    DM (diabetes mellitus) type II, controlled, with peripheral vascular disorder (HCC)    Obstructive sleep apnea    MINI (acute kidney injury) ([address]  Resolved Problems:    * No resolved hospital problems.  *       Past History:     Past Medical History:   Diagnosis Date    Acute respiratory failure with hypoxia ([address] 10/23/2014    MINI (acute kidney injury) ([address] 09/15/2014    MINI (acute kidney injury) ([address]     MINI (acute kidney injury) ([address]     Allergic rhinitis 11/10/2015    Asthma 11/10/2015    Benign essential hypertension 11/10/2015    Benign neoplasm of colon 11/10/2015    CAD (coronary artery disease) 11/10/2015    CAD (coronary artery disease) 1998, 1999    mix2, 3 stents [id number]    CAP (community acquired pneumonia) 12/31/2020    Cardiomyopathy ([address] 82/78/1758    Complicated wound infection 11/10/2015    COPD (chronic obstructive pulmonary disease) with emphysema ([address] 11/10/2015    COPD exacerbation ([address] 10/12/2011    COVID-19 12/31/2020    Diabetes mellitus type 2, controlled ([address] 11/10/2015    Diabetic neuropathy ([address] 11/10/2015    Disruption of wound, unspecified 10/09/2014    Encounter for long-term (current) use of other high-risk medications 11/10/2015    Extremity atherosclerosis with intermittent claudication ([address] 11/10/2015    H/O endarterectomy 11/10/2015    Hiatal hernia 11/10/2015    History of 2019 novel coronavirus disease (COVID-19)     12/31/2020-    Hyperglycemia due to type 1 diabetes mellitus ([address] 11/10/2015    Hyperlipidemia 11/10/2015    ICD (implantable cardioverter-defibrillator) in place 06/16/2022    Monomorphic ventricular tachycardia ([address] 12/31/2020    Myocardial infarction ([address] 1999    Myocardial infarction ([address] 11/10/2015    Obesity 11/10/2015    Orthostatic hypotension 11/10/2015    Osteoarthritis 11/10/2015    Peripheral vascular disease ([address] 11/10/2015    PNA (pneumonia) 02/08/2019    PVC (premature ventricular contraction)     Rash 66/28/6790    Seroma complicating a procedure 10/02/2014    Sleep apnea     cpap    Toe laceration     Type 2 diabetes with nephropathy ([facility]     Uncontrolled type 2 diabetes mellitus ([facility] 11/10/2015    Vertiginous syndromes and other disorders of vestibular system 11/10/2015       Past Surgical History:   Procedure Laterality Date    CARDIAC CATHETERIZATION  12/05/2018    LV EF=40%. LM:nl. LAD:30-40% mid stenosis. LCX OM1:95% stenosis. RCA:100% occlusion at RV branch. CORONARY ANGIOPLASTY WITH STENT PLACEMENT  12/05/2018    LCX OM1:2.5 x 12 Giuseppe RAKESH    CORONARY ANGIOPLASTY WITH STENT PLACEMENT  1999    IA ABDOMEN SURGERY PROC UNLISTED  1960    abdominal cyst removed    IA CARDIAC SURG PROCEDURE UNLIST  1999    stents x3    SPINE SURGERY N/A 7/19/2022    LUMBAR 2, LUMBAR 4 KYPHOPLASTY AND ANY OTHER INDICATED LEVELS performed by Joseph Augustin MD at [facility] MAIN OR    VASCULAR SURGERY  11/5/14    Repair of right common femoral artery     VASCULAR SURGERY  9/11/14    tiffanie femoral endarterectomy        Social History     Tobacco Use    Smoking status: Former     Packs/day: 1.00     Types: Cigarettes     Quit date: 10/2/2011     Years since quitting: 10.9    Smokeless tobacco: Current   Substance Use Topics    Alcohol use:  Yes     Alcohol/week: 0.0 standard drinks      Social History     Substance and Sexual Activity   Drug Use No       Family History   Problem Relation Age of Onset    Breast Cancer Mother     Hypertension Mother     Other Father         DIVERTICULITIS    Crohn's Disease Sister     Lung Disease Brother         mesothioloma    Diabetes Sister     Heart Attack Father     Heart Disease Father     Stroke Mother         Immunization History   Administered Date(s) Administered    COVID-19, MODERNA BLUE border, Primary or Immunocompromised, (age 12y+), IM, 100 mcg/0.5mL 03/15/2021, 04/15/2021    Influenza Trivalent 09/26/2014, 10/12/2015    Influenza Virus Vaccine 09/30/2010, 10/17/2011, 09/01/2013, 11/02/2014    Influenza, FLUAD, (age 72 y+), Adjuvanted, 0.5mL 11/02/2021    Influenza, High Dose (Fluzone 65 yrs and older) 11/17/2016, 08/11/2017, 09/13/2018, 10/07/2019, 09/30/2020    PPD Test 01/06/2021, 07/27/2022    Pneumococcal Conjugate 13-valent ([id number]) 04/10/2015    Pneumococcal Polysaccharide ([id number]) 11/27/2007, 10/17/2011    Pneumococcal Vaccine 10/17/2011    Zoster Recombinant (Shingrix) 09/06/2018, 11/27/2018     Allergies   Allergen Reactions    Azithromycin Hives    Oxaprozin Other (See Comments)     ELEVATED BLOOD PRESSURE     Prior to Admit Medications:  Current Outpatient Medications   Medication Instructions    albuterol sulfate  (90 Base) MCG/ACT inhaler USE 2 PUFFS BY INHALATION 4 TIMES DAILY AS NEEDED FOR WHEEZING OR SHORTNESS OF BREATH.    amiodarone (CORDARONE) 200 mg, Oral, 2 TIMES DAILY    amLODIPine (NORVASC) 5 mg, Oral, DAILY    apixaban (ELIQUIS) 5 mg, Oral, EVERY 12 HOURS    ascorbic acid (VITAMIN C) 500 mg, Oral    calcitonin (MIACALCIN) 200 UNIT/ACT nasal spray 1 spray, Nasal, DAILY    carvedilol (COREG) 25 mg, Oral, 2 TIMES DAILY WITH MEALS    Cholecalciferol 2,000 Units, Oral    clopidogrel (PLAVIX) 75 mg, Oral, DAILY    fluticasone (FLONASE) 50 MCG/ACT nasal spray USE 1 OR 2 SPRAYS IN EACH NOSTRIL EVERY DAY.     fluticasone-salmeterol (ADVAIR) 250-50 MCG/ACT AEPB diskus inhaler 1 puff, Inhalation, 2 times daily    furosemide (LASIX) 20 mg, Oral, DAILY    glipiZIDE (GLUCOTROL XL) 10 MG extended release tablet TAKE 1 TABLET BY MOUTH TWICE DAILY    glucosamine-chondroitin 750-600 MG TABS tablet Oral, 2 TIMES DAILY    guaiFENesin 1,200 mg, Oral, 2 TIMES DAILY PRN    latanoprost (XALATAN) 0.005 % ophthalmic solution 1 drop, Ophthalmic    magnesium oxide (MAG-OX) 400 (240 Mg) MG tablet TAKE 1 TABLET BY MOUTH EVERY DAY    montelukast (SINGULAIR) 10 MG tablet TAKE 1 TABLET BY MOUTH EVERY DAY AT BEDTIME    nitroGLYCERIN (NITROSTAT) 0.4 mg, SubLINGual    OLANZapine zydis (ZYPREXA) 5 mg, Oral, NIGHTLY    pantoprazole (PROTONIX) 40 mg, Oral, 2 TIMES DAILY BEFORE MEALS    polyethylene glycol (GLYCOLAX) 17 g, Oral, DAILY    QUEtiapine (SEROQUEL) 25 mg, Oral, DAILY    rosuvastatin (CRESTOR) 10 MG tablet TAKE 1 TABLET BY MOUTH EVERY DAY    SITagliptin (JANUVIA) 100 MG tablet TAKE 1 TABLET BY MOUTH EVERY DAY    tamsulosin (FLOMAX) 0.4 mg, Oral, DAILY    tiZANidine (ZANAFLEX) 2 mg, Oral, EVERY 8 HOURS PRN    valsartan (DIOVAN) 320 mg, Oral, DAILY         Objective:   Patient Vitals for the past 24 hrs:   Temp Pulse Resp BP SpO2   09/07/22 0750 98.1 °F (36.7 °C) 65 17 128/67 --   09/07/22 0552 -- 72 16 -- 95 %   09/06/22 1925 97.5 °F (36.4 °C) 66 16 122/70 93 %   09/06/22 1754 -- -- -- -- 97 %   09/06/22 1542 97.9 °F (36.6 °C) 71 16 121/70 94 %       Oxygen Therapy  SpO2: 95 %  Pulse Oximeter Device Mode: Intermittent  Pulse Oximeter Device Location: Right, Finger  O2 Device: PAP (positive airway pressure)  Skin Assessment: Clean, dry, & intact  Skin Protection for O2 Device: N/A  Location: Nose  FiO2 : 28 %  O2 Flow Rate (L/min): 2 L/min  O2 Delivery Method: CPAP/Bi-PAP mask    Estimated body mass index is 31.27 kg/m² as calculated from the following:    Height as of this encounter: 6' 1\" (1.854 m). Weight as of this encounter: 237 lb (107.5 kg). Intake/Output Summary (Last 24 hours) at 9/7/2022 1440  Last data filed at 9/7/2022 1200  Gross per 24 hour   Intake 960 ml   Output 600 ml   Net 360 ml         Physical Exam:    Blood pressure 128/67, pulse 65, temperature 98.1 °F (36.7 °C), temperature source Oral, resp. rate 17, height 6' 1\" (1.854 m), weight 237 lb (107.5 kg), SpO2 95 %. General:    Well nourished. Head:  Normocephalic, atraumatic  Eyes:  Sclerae appear normal.  Pupils equally round. ENT:  Nares appear normal, no drainage. Moist oral mucosa  Neck:  No restricted ROM. Trachea midline   CV:   RRR. No m/r/g.   No jugular venous distension. Lungs:   CTAB. No wheezing, rhonchi, or rales. Symmetric expansion. Abdomen: Bowel sounds present. Soft, nontender, nondistended. Extremities: No cyanosis or clubbing. No edema  Skin:     No rashes and normal coloration. Warm and dry. Neuro:  CN II-XII grossly intact. Sensation intact. A&Ox3  Psych:  Normal mood and affect. I have personally reviewed labs and tests showing:  Recent Labs:  Recent Results (from the past 24 hour(s))   POCT Glucose    Collection Time: 09/06/22  4:08 PM   Result Value Ref Range    POC Glucose 107 (H) 65 - 100 mg/dL    Performed by: Allen Gaspar    POCT Glucose    Collection Time: 09/06/22  9:07 PM   Result Value Ref Range    POC Glucose 146 (H) 65 - 100 mg/dL    Performed by: Marion    POCT Glucose    Collection Time: 09/07/22  6:43 AM   Result Value Ref Range    POC Glucose 100 65 - 100 mg/dL    Performed by: Marion    Procalcitonin    Collection Time: 09/07/22  9:15 AM   Result Value Ref Range    Procalcitonin <0.05 0.00 - 0.49 ng/mL   Vancomycin Level, Trough    Collection Time: 09/07/22  9:15 AM   Result Value Ref Range    Vancomycin Tr 12.2 5 - 20 ug/mL   C-Reactive Protein    Collection Time: 09/07/22  9:15 AM   Result Value Ref Range    CRP 4.8 (H) 0.0 - 0.9 mg/dL   Sedimentation Rate    Collection Time: 09/07/22 11:01 AM   Result Value Ref Range    Sed Rate, Automated 4 (L) 15 mm/hr   POCT Glucose    Collection Time: 09/07/22 11:17 AM   Result Value Ref Range    POC Glucose 98 65 - 100 mg/dL    Performed by: [place] ([place])        I have personally reviewed imaging studies showing:  MRI LUMBAR SPINE W WO CONTRAST    Result Date: 9/6/2022  Exam: MRI lumbar spine with and without contrast. Indication: Recent L2 and L4 compression fractures with subsequent ostomy myelitis and psoas abscess. Persistent pain.  Comparison: MRI lumbar spine, 7/29/2022 Contrast: 20 mL intravenous ProHance Technique: Multiplanar multisequence imaging of the lumbar spine was performed with and without contrast. FINDINGS: There are 5 nonrib-bearing lumbar-type vertebral bodies. Alignment is maintained. Redemonstration of L2 and L4 compression fractures status post kyphoplasty without progressive vertebral body height loss. There is no new fracture. Interval progression in confluent T1 hypointense signal abnormality involving the L2-L4 vertebral bodies with worsening destructive endplate changes at V6-O8 and L3-L4 where there is persistent intradiscal fluid. There is improved but persistent ventral epidural enhancement at the level of L2-L3 contributing to spinal canal narrowing described below. Previously there was a more well-defined epidural fluid collection at this location. There is persistent prevertebral edema and enhancement at L2-L4 with enhancement in the bilateral psoas musculature. Interval decrease in size of the left psoas muscle abscess at the level of L2-L3 which communicates with the disc space, now measuring 1.4 x 0.9 x 1.3 cm, previously 1.7 x 1.0 x 2.5 cm when measured similarly. There is a separate appearing left psoas muscle abscess at the level of L3-L4 communicating with the disc space measuring 1.2 x 1.3 x 1.4 cm, not definitively present on the prior. The conus medullaris terminates at L2. There is compression of the cauda equina nerve roots at multiple levels in the lumbar spine as before. There is superimposed multilevel degenerative disc disease and facet arthropathy with varying degrees of disc space narrowing throughout the lumbar spine. T12-L1: No spinal canal or neuroforaminal stenosis. L1-L2: Diffuse disc bulge and bilateral facet arthropathy. No spinal canal stenosis with moderate bilateral neuroforaminal stenoses. L2-L3: Diffuse disc bulge with ventral epidural enhancement. Bilateral facet arthropathy.  There is no significant change in severe spinal canal stenosis with moderate bilateral neuroforaminal stenoses. L3-L4: Diffuse disc bulge with bilateral facet arthropathy and ligamentum flavum hypertrophy. Moderate to severe spinal canal stenosis with moderate to severe bilateral neuroforaminal stenoses. L4-L5: Diffuse disc bulge with bilateral facet arthropathy and ligamentum flavum hypertrophy. Moderate spinal canal stenosis with moderate bilateral neuroforaminal stenoses. L5-S1: Diffuse disc bulge with bilateral facet arthropathy and ligamentum flavum hypertrophy. Mild spinal canal stenosis with moderate bilateral neuroforaminal stenoses. 1. Worsening imaging findings of osteomyelitis discitis at L2-L4. 2. Slight improvement in persistent ventral epidural enhancing phlegmon versus small abscess at the level of L2-L3. This superimposed on the preexisting degenerative changes results in unchanged severe spinal canal narrowing. 3. Bilateral psoas muscle myositis with a new left psoas muscle abscess of the level of L3-L4 and decrease but persistent left-sided abscess at the level of L2-L3. Echocardiogram:  07/27/22    TRANSTHORACIC ECHOCARDIOGRAM (TTE) COMPLETE (CONTRAST/BUBBLE/3D PRN) 07/30/2022  6:06 PM (Final)    Interpretation Summary  Formatting of this result is different from the original.      Left Ventricle: Moderately reduced left ventricular systolic function with a visually estimated EF of 40 - 45%. Left ventricle is dilated. Normal wall thickness. Increased ventricular mass. Moderate hypokinesis of the following segments: basal anterolateral, basal anteroseptal, basal inferolateral and basal inferoseptal. Normal diastolic function. Aortic Valve: Tricuspid valve. Sclerosis of the aortic valve cusp. Mitral Valve: Mild regurgitation. Tricuspid Valve: The estimated RVSP is 27 mmHg. Left Atrium: Left atrium is dilated. Aorta: Dilated aortic root. Mildly dilated ascending aorta.     Technical qualifiers: Color flow Doppler was performed and pulse wave and/or continuous wave Doppler was performed. Contrast used: Definity.     RVSP=27mmHg    Signed by: Miles Hood MD on 7/30/2022  6:06 PM        Orders Placed This Encounter   Medications    DISCONTD: albuterol sulfate HFA (PROVENTIL;VENTOLIN;PROAIR) 108 (90 Base) MCG/ACT inhaler 2 puff     Order Specific Question:   Initiate RT Bronchodilator Protocol     Answer:   Yes - Inpatient Protocol    amiodarone (CORDARONE) tablet 200 mg    DISCONTD: amLODIPine (NORVASC) tablet 5 mg    apixaban (ELIQUIS) tablet 5 mg     Order Specific Question:   Indication of Use     Answer:   A Fib/A Flutter    atorvastatin (LIPITOR) tablet 20 mg    bisacodyl (DULCOLAX) suppository 10 mg    calcitonin (MIACALCIN) nasal spray 1 spray    DISCONTD: carvedilol (COREG) tablet 25 mg    DISCONTD: cefTRIAXone (ROCEPHIN) 2,000 mg in sodium chloride 0.9 % 50 mL IVPB mini-bag     Order Specific Question:   Antimicrobial Indications     Answer:   Bone and Joint Infection    clopidogrel (PLAVIX) tablet 75 mg    fluticasone (FLONASE) 50 MCG/ACT nasal spray 1 spray    DISCONTD: fluticasone-salmeterol (ADVIAR) 100-50 MCG/ACT diskus inhaler 1 puff    DISCONTD: furosemide (LASIX) tablet 40 mg    guaiFENesin (MUCINEX) extended release tablet 1,200 mg    DISCONTD: hydrOXYzine pamoate (VISTARIL) capsule 25 mg    insulin glargine (LANTUS) injection vial 14 Units    insulin lispro (HUMALOG) injection vial 0-16 Units    insulin lispro (HUMALOG) injection vial 0-4 Units    latanoprost (XALATAN) 0.005 % ophthalmic solution 1 drop    lidocaine 4 % external patch 1 patch    losartan (COZAAR) tablet 100 mg    melatonin tablet 6 mg    montelukast (SINGULAIR) tablet 10 mg    naproxen (NAPROSYN) tablet 500 mg    OLANZapine (ZYPREXA) tablet 2.5 mg    pantoprazole (PROTONIX) tablet 40 mg    polyethylene glycol (GLYCOLAX) packet 17 g    DISCONTD: sennosides-docusate sodium (SENOKOT-S) 8.6-50 MG tablet 2 tablet    glucose chewable tablet 16 g    OR Linked Order Group     dextrose bolus 10% 125 mL Answer:   Yes - Inpatient Protocol    albuterol sulfate HFA (PROVENTIL;VENTOLIN;PROAIR) 108 (90 Base) MCG/ACT inhaler 2 puff     Order Specific Question:   Initiate RT Bronchodilator Protocol     Answer:   Yes - Inpatient Protocol    albuterol sulfate HFA (PROVENTIL;VENTOLIN;PROAIR) 108 (90 Base) MCG/ACT inhaler 2 puff     Order Specific Question:   Initiate RT Bronchodilator Protocol     Answer:   Yes - Inpatient Protocol    DISCONTD: carvedilol (COREG) tablet 12.5 mg    sodium chloride flush 0.9 % injection 10 mL    DISCONTD: gabapentin (NEURONTIN) capsule 100 mg    oxyCODONE (ROXICODONE) immediate release tablet 5 mg    ondansetron (ZOFRAN-ODT) disintegrating tablet 4 mg    vancomycin random level reminder     Order Specific Question:   Antimicrobial Indications     Answer:   Bloodstream Infection    traZODone (DESYREL) tablet 25 mg    DISCONTD: vancomycin (VANCOCIN) 1250 mg in sodium chloride 0.9% 250 mL IVPB     Order Specific Question:   Antimicrobial Indications     Answer:   Bone and Joint Infection    carvedilol (COREG) tablet 6.25 mg    bisacodyl (DULCOLAX) EC tablet 10 mg    DISCONTD: bumetanide (BUMEX) tablet 2 mg    bumetanide (BUMEX) tablet 2 mg    furosemide (LASIX) tablet 40 mg    metFORMIN (GLUCOPHAGE) tablet 500 mg    DISCONTD: magnesium citrate solution 296 mL    DISCONTD: magnesium hydroxide (MILK OF MAGNESIA) 400 MG/5ML suspension 30 mL    magnesium hydroxide (MILK OF MAGNESIA) 400 MG/5ML suspension 30 mL    DISCONTD: gabapentin (NEURONTIN) capsule 200 mg    DISCONTD: glipiZIDE (GLUCOTROL) tablet 2.5 mg    sennosides-docusate sodium (SENOKOT-S) 8.6-50 MG tablet 2 tablet    DISCONTD: vancomycin (VANCOCIN) 1250 mg in sodium chloride 0.9% 250 mL IVPB     Order Specific Question:   Antimicrobial Indications     Answer:   Bone and Joint Infection    glipiZIDE (GLUCOTROL) tablet 5 mg    ferrous gluconate (FERGON) tablet 324 mg    gabapentin (NEURONTIN) capsule 300 mg    traMADol (ULTRAM) tablet 50 mg gadoteridol (PROHANCE) injection 20 mL    sodium chloride flush 0.9 % injection 10 mL    cyclobenzaprine (FLEXERIL) tablet 5 mg         Signed:  Vivi Yañez MD    Part of this note may have been written by using a voice dictation software. The note has been proof read but may still contain some grammatical/other typographical errors.

## 2022-09-07 NOTE — PROGRESS NOTES
Hold therapies today per Dr Queenie Wright.    Fred Hamilton OTR/L  [facility] Therapy Supervisor

## 2022-09-07 NOTE — PROGRESS NOTES
Physical Therapy  PT AM : Patient in bed in supine with wife present. Patient declined therapy secondary to pain in hips. Patient on medical hold per Dr Queenie Wright.

## 2022-09-07 NOTE — PROGRESS NOTES
TRANSFER - IN REPORT:    Verbal report received from Martell(name) on Ronan Perez  being received from 910(unit) for routine progression of patient care      Report consisted of patients Situation, Background, Assessment and   Recommendations(SBAR). Information from the following report(s) Nurse Handoff Report was reviewed with the receiving nurse. Opportunity for questions and clarification was provided. Assessment completed upon patients arrival to unit and care assume    Awaiting arrival. Will report to oncoming shift.

## 2022-09-07 NOTE — PROGRESS NOTES
It was determined today in team conference that with patient's worsening pain, worsening medical condition (new psoas abscess in addition to unresolved prior psoas abscesses) and inability to participate in 3h intensive therapy daily, he is best served by return to acute care. Hospitalist admission coordinator Pino Rangel messaged via [place]. Dr. Jerson Awan to explain circumstances to patient / wife. Saintclair Hong, PA-C  Physician Assistant with [facility]  Jaky Rockwell MD, Medical Director

## 2022-09-07 NOTE — CONSULTS
Infectious Disease Consult      Today's Date: 9/7/2022   Admit Date: 8/26/2022    Impression:   L2-4 Discitis, culture negative: IV Vanc/ceftriaxone x 42 days with EOT scheduled 09/09/22. MRI done yesterday secondary to worsening symptoms; revealed new psoas abscess. Unfortunately again no cultures to guide us. Would discontinue antibiotic, wait for 3-5 days and ask IR to aspirate new abscess for culture. Plan:   Discontinue Vanc/Ceftriaxone  Repeat Acute phase reactants  Swab nares for MRSA  Would ask IR to re-aspirate new psoas abscess on left in 3-5 days; off antibiotics   Will follow    Anti-infectives:   Vancomycin (07/31-09/07)  Ceftriaxone (07/31-09/07)    Subjective:   Date of Consultation:  September 7, 2022  Date of Admission: 8/26/2022   Referring Provider: CONNOR Larson [facility] [place] [id number]    Patient is a 65 y.o. male seen by ID in consult from 07/30-08/05 when he presented with back pain and weakness on 07/27 following L2-L4 kyphoplasty on 07/20/22. MRI done revealed osteomyelitis. BC and aspirate were both negative and patient treated empirically with IV Vancomycin and Ceftriaxone for 42 days with EOT 09/09/22. He was transferred to [facility] on 08/08 and continued antibiotics; per patient and wife he was walking with assistance. He was transferred to rehab on 08/26/22. Around that time he developed a sharp hip pain radiating from his left to right hip and gradually was became unable to walk. A repeat MRI done 09/06 revealed worsening finding of osteo. Per wife at bedside, they do not want Dr. Rosa Maria Burciaga to operate anymore. Last APRs: Sed rate 36 ; CRP 3.0 on 08/19.     Patient Active Problem List   Diagnosis    H/O endarterectomy    Arterial degeneration    Atrial fibrillation (HCC)    Complicated wound infection    Allergic rhinitis    Elevated troponin    HTN (hypertension)    Pulmonary emphysema (HCC)    Atherosclerosis of native arteries of extremity with intermittent claudication (HCC)    Severe obesity (BMI 35.0-39. 9) with comorbidity ([address]    COPD (chronic obstructive pulmonary disease) (HCC)    Personal history of tobacco use, presenting hazards to health    Intermittent spinal claudication (HCC)    Cigarette nicotine dependence in remission    Irregular heart beat    Acute metabolic encephalopathy    Chest pain    Hiatal hernia    Diabetic neuropathy (HCC)    Normocytic anemia    Chronic pain of right knee    Sleep apnea    Osteoarthritis    Encounter for long-term (current) drug use    Ischemic cardiomyopathy    Ventricular tachycardia (HCC)    VT (ventricular tachycardia) ([address]    Coronary artery disease involving native coronary artery of native heart without angina pectoris    Benign neoplasm of colon    PAD (peripheral artery disease) (HCC)    Asthma    Orthostatic hypotension    Hyperlipidemia    S/P angioplasty with stent    DM (diabetes mellitus) type II, controlled, with peripheral vascular disorder (HCC)    Toe laceration    Obstructive sleep apnea    MINI (acute kidney injury) ([address]    Type 2 diabetes with nephropathy (HCC)    Hypoxia    Abnormal nuclear cardiac imaging test    PVC's (premature ventricular contractions)    Asbestos exposure    Sepsis ([address]    ICD (implantable cardioverter-defibrillator) in place    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, vertebra(e), init ([address]    General weakness    Acute cystitis without hematuria    Low back pain without sciatica    Back pain    DNI (do not intubate)    Elevated liver enzymes    Anemia    Hyponatremia    Physical debility     Past Medical History:   Diagnosis Date    Acute respiratory failure with hypoxia ([address] 10/23/2014    MINI (acute kidney injury) ([address] 09/15/2014    MINI (acute kidney injury) ([address]     MINI (acute kidney injury) ([facility]     Allergic rhinitis 11/10/2015    Asthma 11/10/2015    Benign essential hypertension 11/10/2015    Benign neoplasm of colon 11/10/2015    CAD (coronary artery disease) 11/10/2015    CAD (coronary artery disease) [address]    mix2, 3 stents [id number]    CAP (community acquired pneumonia) 12/31/2020    Cardiomyopathy ([address] 39/44/1244    Complicated wound infection 11/10/2015    COPD (chronic obstructive pulmonary disease) with emphysema ([address] 11/10/2015    COPD exacerbation ([address] 10/12/2011    COVID-19 12/31/2020    Diabetes mellitus type 2, controlled ([address] 11/10/2015    Diabetic neuropathy ([address] 11/10/2015    Disruption of wound, unspecified 10/09/2014    Encounter for long-term (current) use of other high-risk medications 11/10/2015    Extremity atherosclerosis with intermittent claudication ([address] 11/10/2015    H/O endarterectomy 11/10/2015    Hiatal hernia 11/10/2015    History of 2019 novel coronavirus disease (COVID-19)     12/31/2020-    Hyperglycemia due to type 1 diabetes mellitus ([address] 11/10/2015    Hyperlipidemia 11/10/2015    ICD (implantable cardioverter-defibrillator) in place 06/16/2022    Monomorphic ventricular tachycardia ([address] 12/31/2020    Myocardial infarction ([address] 1999    Myocardial infarction ([address] 11/10/2015    Obesity 11/10/2015    Orthostatic hypotension 11/10/2015    Osteoarthritis 11/10/2015    Peripheral vascular disease ([address] 11/10/2015    PNA (pneumonia) 02/08/2019    PVC (premature ventricular contraction)     Rash 14/00/2469    Seroma complicating a procedure 10/02/2014    Sleep apnea     cpap    Toe laceration     Type 2 diabetes with nephropathy ([address]     Uncontrolled type 2 diabetes mellitus ([address] 11/10/2015    Vertiginous syndromes and other disorders of vestibular system 11/10/2015      Family History   Problem Relation Age of Onset    Breast Cancer Mother     Hypertension Mother     Other Father         DIVERTICULITIS    Crohn's Disease Sister     Lung Disease Brother         mesothioloma    Diabetes Sister     Heart Attack Father     Heart Disease Father     Stroke Mother       Social History     Tobacco Use    Smoking status: Former     Packs/day: 1.00     Types: Cigarettes     Quit date: 10/2/2011     Years since quitting: 10.9    Smokeless tobacco: Current   Substance Use Topics    Alcohol use: Yes     Alcohol/week: 0.0 standard drinks     Past Surgical History:   Procedure Laterality Date    CARDIAC CATHETERIZATION  12/05/2018    LV EF=40%. LM:nl. LAD:30-40% mid stenosis. LCX OM1:95% stenosis. RCA:100% occlusion at RV branch. CORONARY ANGIOPLASTY WITH STENT PLACEMENT  12/05/2018    LCX OM1:2.5 x 12 Giuseppe RAKESH    CORONARY ANGIOPLASTY WITH STENT PLACEMENT  1999    MT ABDOMEN SURGERY PROC UNLISTED  1960    abdominal cyst removed    MT CARDIAC SURG PROCEDURE UNLIST  1999    stents x3    SPINE SURGERY N/A 7/19/2022    LUMBAR 2, LUMBAR 4 KYPHOPLASTY AND ANY OTHER INDICATED LEVELS performed by Drea Sanchez MD at [address] [place] [address]    VASCULAR SURGERY  11/5/14    Repair of right common femoral artery     VASCULAR SURGERY  9/11/14    tiffanie femoral endarterectomy      Prior to Admission medications    Medication Sig Start Date End Date Taking? Authorizing Provider   furosemide (LASIX) 20 MG tablet Take 1 tablet by mouth in the morning. 8/9/22   Maxcine Cowden, MD   tamsulosin (FLOMAX) 0.4 MG capsule Take 1 capsule by mouth in the morning. 8/9/22   Maxcine Cowden, MD   pantoprazole (PROTONIX) 40 MG tablet Take 1 tablet by mouth in the morning and 1 tablet in the evening. Take before meals. 8/8/22   Maxcine Cowden, MD   amLODIPine (NORVASC) 5 MG tablet Take 1 tablet by mouth in the morning. 8/9/22   Maxcine Cowden, MD   OLANZapine zydis (ZYPREXA) 5 MG disintegrating tablet Take 1 tablet by mouth nightly 8/8/22   Maxcine Cowden, MD   QUEtiapine (SEROQUEL) 25 MG tablet Take 1 tablet by mouth in the morning.  8/9/22   Maxcine Cowden, MD   rosuvastatin (CRESTOR) 10 MG tablet TAKE 1 TABLET BY MOUTH EVERY DAY 7/29/22   Coretta Gitelman, MD   tiZANidine (ZANAFLEX) 2 MG tablet Take 1 tablet by mouth every 8 hours as needed (muscle spasms) 7/25/22   Coretta Gitelman, MD   fluticasone-salmeterol (ADVAIR) 197-50 MCG/ACT AEPB diskus inhaler Inhale 1 puff into the lungs in the morning and at bedtime 6/30/22   Historical Provider, MD   calcitonin (MIACALCIN) 200 UNIT/ACT nasal spray 1 spray by Nasal route daily 6/3/22   Kim Piper APRN - CNP   albuterol sulfate  (90 Base) MCG/ACT inhaler USE 2 PUFFS BY INHALATION 4 TIMES DAILY AS NEEDED FOR WHEEZING OR SHORTNESS OF BREATH. 1/14/22   Ar Automatic Reconciliation   amiodarone (CORDARONE) 200 MG tablet Take 200 mg by mouth 2 times daily 3/10/22   Ar Automatic Reconciliation   apixaban (ELIQUIS) 5 MG TABS tablet Take 5 mg by mouth every 12 hours 3/10/22   Ar Automatic Reconciliation   ascorbic acid (VITAMIN C) 500 MG tablet Take 500 mg by mouth    Ar Automatic Reconciliation   carvedilol (COREG) 25 MG tablet Take 25 mg by mouth 2 times daily (with meals) 3/10/22   Ar Automatic Reconciliation   Cholecalciferol 50 MCG (2000 UT) TABS Take 2,000 Units by mouth    Ar Automatic Reconciliation   clopidogrel (PLAVIX) 75 MG tablet Take 75 mg by mouth daily 3/11/22   Ar Automatic Reconciliation   fluticasone (FLONASE) 50 MCG/ACT nasal spray USE 1 OR 2 SPRAYS IN EACH NOSTRIL EVERY DAY. 3/15/22   Ar Automatic Reconciliation   glipiZIDE (GLUCOTROL XL) 10 MG extended release tablet TAKE 1 TABLET BY MOUTH TWICE DAILY 1/14/22   Ar Automatic Reconciliation   glucosamine-chondroitin 750-600 MG TABS tablet Take by mouth 2 times daily    Ar Automatic Reconciliation   guaiFENesin 1200 MG TB12 Take 1,200 mg by mouth 2 times daily as needed    Ar Automatic Reconciliation   latanoprost (XALATAN) 0.005 % ophthalmic solution Apply 1 drop to eye    Ar Automatic Reconciliation   magnesium oxide (MAG-OX) 400 (240 Mg) MG tablet TAKE 1 TABLET BY MOUTH EVERY DAY 1/14/22   Ar Automatic Reconciliation   montelukast (SINGULAIR) 10 MG tablet TAKE 1 TABLET BY MOUTH EVERY DAY AT BEDTIME 3/15/22   Ar Automatic Reconciliation   nitroGLYCERIN (NITROSTAT) 0.4 MG SL tablet Place 0.4 mg under the tongue 3/11/22   Ar Automatic Reconciliation   polyethylene glycol (GLYCOLAX) 17 GM/SCOOP powder Take 17 g by mouth daily 21   Ar Automatic Reconciliation   SITagliptin (JANUVIA) 100 MG tablet TAKE 1 TABLET BY MOUTH EVERY DAY 3/15/22   Ar Automatic Reconciliation   valsartan (DIOVAN) 320 MG tablet Take 320 mg by mouth daily 3/10/22   Ar Automatic Reconciliation   metFORMIN (GLUCOPHAGE) 500 MG tablet TAKE TWO TABLETS BY MOUTH 2 TIMES A DAY 3/23/22 8/8/22  Ar Automatic Reconciliation     Allergies   Allergen Reactions    Azithromycin Hives    Oxaprozin Other (See Comments)     ELEVATED BLOOD PRESSURE        Review of Systems:  Comprehensive ROS negative other than mentioned in HPI    Objective:   Blood pressure 128/67, pulse 65, temperature 98.1 °F (36.7 °C), temperature source Oral, resp. rate 17, height 6' 1\" (1.854 m), weight 237 lb (107.5 kg), SpO2 95 %. Visit Vitals  /67   Pulse 65   Temp 98.1 °F (36.7 °C) (Oral)   Resp 17   Ht 6' 1\" (1.854 m)   Wt 237 lb (107.5 kg)   SpO2 95%   BMI 31.27 kg/m²     Temp (24hrs), Av.8 °F (36.6 °C), Min:97.5 °F (36.4 °C), Max:98.1 °F (36.7 °C)       Exam:    General:  Alert, cooperative, morbidly obese,  well developed, appears stated age   Eyes:  Sclera anicteric. Pupils equally round and reactive to light. Mouth/Throat: Mucous membranes normal, oral pharynx clear   Neck: Supple   Lungs:   Clear to auscultation bilaterally, good effort   CV:  Regular rate and rhythm,no murmur, click, rub or gallop   Abdomen:   Soft, non-tender.  bowel sounds normal. non-distended   Extremities: No cyanosis or edema   Skin: Skin color, texture, turgor normal. no acute rash or lesions   Lymph nodes: Pain with right SLR; negative with left   Musculoskeletal:    Lines/Devices:  :  Right midline, nontender, no erythema   Psych: Alert and oriented, normal mood affect given the setting       CBC:  Recent Labs     22  0415   WBC 7.1   HGB 8.4*   HCT 26.5*          BMP:  Recent Labs 09/06/22  0415   BUN 15      K 3.7      CO2 31       LFTS:  Recent Labs     09/06/22  0415   ALT 45       Data Review:   Recent Results (from the past 24 hour(s))   POCT Glucose    Collection Time: 09/06/22 11:11 AM   Result Value Ref Range    POC Glucose 113 (H) 65 - 100 mg/dL    Performed by: Luther Maldonado    POCT Glucose    Collection Time: 09/06/22  4:08 PM   Result Value Ref Range    POC Glucose 107 (H) 65 - 100 mg/dL    Performed by: Luther Maldonado    POCT Glucose    Collection Time: 09/06/22  9:07 PM   Result Value Ref Range    POC Glucose 146 (H) 65 - 100 mg/dL    Performed by: [place]    POCT Glucose    Collection Time: 09/07/22  6:43 AM   Result Value Ref Range    POC Glucose 100 65 - 100 mg/dL    Performed by: [facility]         Microbiology:    BC x 2 07/30 negative  Disc aspirate 08/01: negative; gram stain with 20-50 WBC/HPF/ no organisms seen  MRSA nasal swab 03/08/22 negative  Studies:    MRI Lumbar Spine (09/06/22): Impression       1. Worsening imaging findings of osteomyelitis discitis at L2-L4. 2. Slight improvement in persistent ventral epidural enhancing phlegmon versus   small abscess at the level of L2-L3. This superimposed on the preexisting   degenerative changes results in unchanged severe spinal canal narrowing. 3. Bilateral psoas muscle myositis with a new left psoas muscle abscess of the   level of L3-L4 and decrease but persistent left-sided abscess at the level of   L2-L3.            Signed By: Denice Phan MD     September 7, 2022

## 2022-09-07 NOTE — PLAN OF CARE
Problem: Safety - Adult  Goal: Free from fall injury  9/7/2022 1050 by Dennis Joyce RN  Outcome: Progressing  9/6/2022 2230 by Darcie Majano RN  Outcome: Progressing     Problem: ABCDS Injury Assessment  Goal: Absence of physical injury  9/7/2022 1050 by Dennis Joyce RN  Outcome: Progressing  9/6/2022 2230 by Darcie Majano RN  Outcome: Progressing     Problem: Pain  Goal: Verbalizes/displays adequate comfort level or baseline comfort level  9/7/2022 1050 by Dennis Joyce RN  Outcome: Progressing  9/6/2022 2230 by Darcie Majano RN  Outcome: Progressing     Problem: Skin/Tissue Integrity  Goal: Absence of new skin breakdown  Description: 1. Monitor for areas of redness and/or skin breakdown  2. Assess vascular access sites hourly  3. Every 4-6 hours minimum:  Change oxygen saturation probe site  4. Every 4-6 hours:  If on nasal continuous positive airway pressure, respiratory therapy assess nares and determine need for appliance change or resting period. 9/7/2022 1050 by Dennis Joyce RN  Outcome: Progressing  9/6/2022 2230 by Darcie Majano RN  Outcome: Progressing

## 2022-09-07 NOTE — PROGRESS NOTES
Pt. To be transferred out of [facility] to [facility] for ongoing medical care due to function decline over the last several days. Goals not met secondary to decline in functional status. Goals:  Short Term Goals:(cont with STGs x 1 week)  Pt will demonstrate roll left & right & transition supine<>sit with Min A in 1 week  9/7/2022 Not met   2. Pt. will transfer from bed to/from chair with CGA using the least restrictive device in 1 week       9/7/2022 Not met  3. Pt. will ambulate with 50 feet with RW or LRAD and Min A in 1  week        9/7/2022 Not met     4. Pt. Will ambulate up/down 1 steps in parallel bars and Mod A in 1 week        9/7/2022 Not met     5. Pt. Will propel w/c 100 feet with Supervision/Standby Assist in 1 week     9/7/2022 Not met     Long Term Goals:(discontinued as of 09/03/2022 due to decline in functional status. Will reassess progress next week and resume LTGs as appropriate)  Pt will demonstrate roll left & right & transition supine<>sit with Independent in 2 weeks  2. Pt. will transfer from bed to/from chair with Independent using the least restrictive device in 2 weeks   3. Pt. will ambulate with 150 feet with RW or LRAD and Supervision/Standby Assist in 2  weeks  4. Pt. Will ambulate up/down 2 single curb steps with RW and Min A in 2 weeks  5. Pt.  Will propel w/c 150 feet with Supervision/Standby Assist in 2 weeks

## 2022-09-07 NOTE — PROGRESS NOTES
Pt is not being seen for therapy secondary to being on medical hold.      Jessa Samayoa OTR/L  9/7/2022

## 2022-09-08 ENCOUNTER — CARE COORDINATION (OUTPATIENT)
Dept: CARE COORDINATION | Facility: CLINIC | Age: 77
End: 2022-09-08

## 2022-09-08 PROBLEM — M46.46 LUMBAR DISCITIS: Status: ACTIVE | Noted: 2022-09-08

## 2022-09-08 PROBLEM — K68.12 PSOAS MUSCLE ABSCESS (HCC): Status: ACTIVE | Noted: 2022-09-08

## 2022-09-08 LAB
GLUCOSE BLD STRIP.AUTO-MCNC: 102 MG/DL (ref 65–100)
GLUCOSE BLD STRIP.AUTO-MCNC: 104 MG/DL (ref 65–100)
GLUCOSE BLD STRIP.AUTO-MCNC: 170 MG/DL (ref 65–100)
GLUCOSE BLD STRIP.AUTO-MCNC: 80 MG/DL (ref 65–100)
GLUCOSE BLD STRIP.AUTO-MCNC: 96 MG/DL (ref 65–100)
SERVICE CMNT-IMP: ABNORMAL
SERVICE CMNT-IMP: NORMAL
SERVICE CMNT-IMP: NORMAL

## 2022-09-08 PROCEDURE — 2700000000 HC OXYGEN THERAPY PER DAY

## 2022-09-08 PROCEDURE — 94640 AIRWAY INHALATION TREATMENT: CPT

## 2022-09-08 PROCEDURE — 1100000000 HC RM PRIVATE

## 2022-09-08 PROCEDURE — 97166 OT EVAL MOD COMPLEX 45 MIN: CPT

## 2022-09-08 PROCEDURE — 97530 THERAPEUTIC ACTIVITIES: CPT

## 2022-09-08 PROCEDURE — 6370000000 HC RX 637 (ALT 250 FOR IP): Performed by: INTERNAL MEDICINE

## 2022-09-08 PROCEDURE — 2060000000 HC ICU INTERMEDIATE R&B

## 2022-09-08 PROCEDURE — 6370000000 HC RX 637 (ALT 250 FOR IP): Performed by: HOSPITALIST

## 2022-09-08 PROCEDURE — 1100000003 HC PRIVATE W/ TELEMETRY

## 2022-09-08 PROCEDURE — 2580000003 HC RX 258: Performed by: INTERNAL MEDICINE

## 2022-09-08 PROCEDURE — 97535 SELF CARE MNGMENT TRAINING: CPT

## 2022-09-08 PROCEDURE — 92610 EVALUATE SWALLOWING FUNCTION: CPT

## 2022-09-08 PROCEDURE — 82962 GLUCOSE BLOOD TEST: CPT

## 2022-09-08 PROCEDURE — 94760 N-INVAS EAR/PLS OXIMETRY 1: CPT

## 2022-09-08 PROCEDURE — 97112 NEUROMUSCULAR REEDUCATION: CPT

## 2022-09-08 PROCEDURE — 97162 PT EVAL MOD COMPLEX 30 MIN: CPT

## 2022-09-08 RX ORDER — NICOTINE 21 MG/24HR
1 PATCH, TRANSDERMAL 24 HOURS TRANSDERMAL DAILY
Status: DISCONTINUED | OUTPATIENT
Start: 2022-09-08 | End: 2022-09-16 | Stop reason: HOSPADM

## 2022-09-08 RX ORDER — HYDROMORPHONE HYDROCHLORIDE 1 MG/ML
0.25 INJECTION, SOLUTION INTRAMUSCULAR; INTRAVENOUS; SUBCUTANEOUS EVERY 4 HOURS PRN
Status: DISCONTINUED | OUTPATIENT
Start: 2022-09-08 | End: 2022-09-16 | Stop reason: HOSPADM

## 2022-09-08 RX ORDER — ALBUTEROL SULFATE 90 UG/1
2 AEROSOL, METERED RESPIRATORY (INHALATION) 2 TIMES DAILY
Status: DISCONTINUED | OUTPATIENT
Start: 2022-09-08 | End: 2022-09-16 | Stop reason: HOSPADM

## 2022-09-08 RX ORDER — HYDROMORPHONE HYDROCHLORIDE 1 MG/ML
0.5 INJECTION, SOLUTION INTRAMUSCULAR; INTRAVENOUS; SUBCUTANEOUS EVERY 4 HOURS PRN
Status: DISCONTINUED | OUTPATIENT
Start: 2022-09-08 | End: 2022-09-16 | Stop reason: HOSPADM

## 2022-09-08 RX ADMIN — APIXABAN 5 MG: 5 TABLET, FILM COATED ORAL at 22:16

## 2022-09-08 RX ADMIN — MONTELUKAST 10 MG: 10 TABLET, FILM COATED ORAL at 22:16

## 2022-09-08 RX ADMIN — CALCITONIN SALMON 1 SPRAY: 200 SPRAY, METERED NASAL at 11:04

## 2022-09-08 RX ADMIN — NAPROXEN 500 MG: 250 TABLET ORAL at 08:31

## 2022-09-08 RX ADMIN — FLUTICASONE PROPIONATE 1 SPRAY: 50 SPRAY, METERED NASAL at 08:32

## 2022-09-08 RX ADMIN — SODIUM CHLORIDE, PRESERVATIVE FREE 10 ML: 5 INJECTION INTRAVENOUS at 08:33

## 2022-09-08 RX ADMIN — ALBUTEROL SULFATE 2 PUFF: 90 AEROSOL, METERED RESPIRATORY (INHALATION) at 05:00

## 2022-09-08 RX ADMIN — FERROUS GLUCONATE 324 MG: 324 TABLET ORAL at 08:31

## 2022-09-08 RX ADMIN — ALBUTEROL SULFATE 2 PUFF: 90 AEROSOL, METERED RESPIRATORY (INHALATION) at 19:57

## 2022-09-08 RX ADMIN — CARVEDILOL 6.25 MG: 3.12 TABLET, FILM COATED ORAL at 17:46

## 2022-09-08 RX ADMIN — NAPROXEN 500 MG: 250 TABLET ORAL at 17:47

## 2022-09-08 RX ADMIN — MOMETASONE FUROATE AND FORMOTEROL FUMARATE DIHYDRATE 2 PUFF: 100; 5 AEROSOL RESPIRATORY (INHALATION) at 05:00

## 2022-09-08 RX ADMIN — GLIPIZIDE 5 MG: 5 TABLET ORAL at 08:31

## 2022-09-08 RX ADMIN — METFORMIN HYDROCHLORIDE 500 MG: 500 TABLET ORAL at 08:31

## 2022-09-08 RX ADMIN — CLOPIDOGREL BISULFATE 75 MG: 75 TABLET ORAL at 08:31

## 2022-09-08 RX ADMIN — GUAIFENESIN 1200 MG: 600 TABLET ORAL at 08:31

## 2022-09-08 RX ADMIN — SODIUM CHLORIDE, PRESERVATIVE FREE 10 ML: 5 INJECTION INTRAVENOUS at 22:18

## 2022-09-08 RX ADMIN — ATORVASTATIN CALCIUM 20 MG: 20 TABLET, FILM COATED ORAL at 22:16

## 2022-09-08 RX ADMIN — PANTOPRAZOLE SODIUM 40 MG: 40 TABLET, DELAYED RELEASE ORAL at 17:47

## 2022-09-08 RX ADMIN — ACETAMINOPHEN 650 MG: 325 TABLET ORAL at 08:30

## 2022-09-08 RX ADMIN — TRAZODONE HYDROCHLORIDE 25 MG: 50 TABLET ORAL at 22:16

## 2022-09-08 RX ADMIN — GABAPENTIN 300 MG: 300 CAPSULE ORAL at 22:15

## 2022-09-08 RX ADMIN — ACETAMINOPHEN 650 MG: 325 TABLET ORAL at 22:16

## 2022-09-08 RX ADMIN — OXYCODONE 5 MG: 5 TABLET ORAL at 10:46

## 2022-09-08 RX ADMIN — FUROSEMIDE 40 MG: 40 TABLET ORAL at 08:31

## 2022-09-08 RX ADMIN — GUAIFENESIN 1200 MG: 600 TABLET ORAL at 22:15

## 2022-09-08 RX ADMIN — METFORMIN HYDROCHLORIDE 500 MG: 500 TABLET ORAL at 17:47

## 2022-09-08 RX ADMIN — ANTI-FUNGAL POWDER MICONAZOLE NITRATE TALC FREE: 1.42 POWDER TOPICAL at 22:14

## 2022-09-08 RX ADMIN — GABAPENTIN 300 MG: 300 CAPSULE ORAL at 08:31

## 2022-09-08 RX ADMIN — APIXABAN 5 MG: 5 TABLET, FILM COATED ORAL at 08:31

## 2022-09-08 RX ADMIN — AMIODARONE HYDROCHLORIDE 200 MG: 200 TABLET ORAL at 08:31

## 2022-09-08 RX ADMIN — LATANOPROST 1 DROP: 50 SOLUTION OPHTHALMIC at 22:15

## 2022-09-08 RX ADMIN — GABAPENTIN 300 MG: 300 CAPSULE ORAL at 13:58

## 2022-09-08 RX ADMIN — CARVEDILOL 6.25 MG: 3.12 TABLET, FILM COATED ORAL at 08:31

## 2022-09-08 RX ADMIN — ACETAMINOPHEN 650 MG: 325 TABLET ORAL at 13:58

## 2022-09-08 RX ADMIN — ANTI-FUNGAL POWDER MICONAZOLE NITRATE TALC FREE: 1.42 POWDER TOPICAL at 08:32

## 2022-09-08 RX ADMIN — Medication 6 MG: at 22:16

## 2022-09-08 RX ADMIN — PANTOPRAZOLE SODIUM 40 MG: 40 TABLET, DELAYED RELEASE ORAL at 06:07

## 2022-09-08 ASSESSMENT — PAIN SCALES - GENERAL
PAINLEVEL_OUTOF10: 8
PAINLEVEL_OUTOF10: 2

## 2022-09-08 ASSESSMENT — PAIN DESCRIPTION - LOCATION: LOCATION: BACK;HIP

## 2022-09-08 ASSESSMENT — PAIN DESCRIPTION - DESCRIPTORS: DESCRIPTORS: ACHING;SHARP

## 2022-09-08 NOTE — PROGRESS NOTES
Patient in bed resting. Alert and oriented x4 . Respirations even and unlabored. Pt is on at home CPAP at this time. Bed is locked with safety measures in place. Pt encouraged to use call light. Preparing to give report to oncoming RN.

## 2022-09-08 NOTE — PROGRESS NOTES
Patient in bed resting at this time. Alert and oriented x 4 at this time. Respirations even and unlabored on room air. Remote telemetry in place. Bed locked and low with urinal at the bedside. Pt encouraged to use call light. Bedside report received from [facility].

## 2022-09-08 NOTE — PROGRESS NOTES
Pt resting in room 807 at this time. Alert and oriented x4. Respirations even and unlabored on room air. Breath sounds diminished with expiratory wheezes. Bowel sounds active. Trace edema present. Bilateral weakness in upper and lower extremities. Generalized ecchymosis present. Skin assessment completed with Carroll Seip, RN. Call light within reach.

## 2022-09-08 NOTE — PROGRESS NOTES
PHYSICAL THERAPY Initial Assessment, Daily Note, and AM  (Link to Caseload Tracking: PT Visit Days : 1  Acknowledge Orders  Time In/Out  PT Charge Capture  Rehab Caseload Tracker    Nicole Rock is a 65 y.o. male   PRIMARY DIAGNOSIS: Lumbar discitis  Lumbar discitis [M46.46]       Reason for Referral: Generalized Muscle Weakness (M62.81)  Difficulty in walking, Not elsewhere classified (R26.2)  History of falling (Z91.81)  Low Back Pain (M54.5)  Inpatient: Payor: MEDICARE / Plan: MEDICARE PART A AND B / Product Type: *No Product type* /     ASSESSMENT:     REHAB RECOMMENDATIONS:   Recommendation to date pending progress:  Setting:  Inpatient Rehab Facility    Equipment:    To Be Determined     ASSESSMENT:  Mr. Iliana Perez  is a 65year old M who presents from [facility] due to uncontrolled back pain. MRI shows worsening osteomyelitis and a psoas abscess. Ortho is not recommending surgery at this time. Pt cleared for mobility per RN. Pt with recent kyphoplasty and stays at [address] [place] [address] but prior to this was independent with ADL's and mobility. This date pt performs mobility including bed mobiltiy with modA of 2 via log roll technique for pain control. Pt required CGA-Karishma for sitting balance with L sided lean noted due to R sided pain. STS practiced x 2, pt required mod-maxA of 2 with forward flexed posture, L lean noted. Pt unable to take steps today due to weakness and pain. Pt with high levels of pain with mobility but willing to work hard with therapy. He will benefit from returning to [facility] at d/c. Pt presents as functioning below his baseline, with deficits in mobility including transfers, gait, balance, and activity tolerance. Pt will benefit from skilled therapy services to address stated deficits to promote return to highest level of function, independence, and safety. Will continue to follow.      [employer] AM-PAC 6 Clicks Basic Mobility Inpatient Short Form  AM-PAC Mobility Inpatient   How much difficulty turning over in bed?: A Lot  How much difficulty sitting down on / standing up from a chair with arms?: A Lot  How much difficulty moving from lying on back to sitting on side of bed?: A Lot  How much help from another person moving to and from a bed to a chair?: A Lot  How much help from another person needed to walk in hospital room?: Total  How much help from another person for climbing 3-5 steps with a railing?: Total  AM-PAC Inpatient Mobility Raw Score : 10  AM-PAC Inpatient T-Scale Score : 32.29  Mobility Inpatient CMS 0-100% Score: 76.75  Mobility Inpatient CMS G-Code Modifier : CL    SUBJECTIVE:   Mr. Erin Saxena states, \"it hurts when I move\"     Social/Functional Lives With: Spouse  Type of Home: House  Home Layout: Two level  Home Access: Stairs to enter with rails  Entrance Stairs - Number of Steps: 4  Entrance Stairs - Rails: Left  Bathroom Shower/Tub: Walk-in shower  Bathroom Toilet: Handicap height  Bathroom Equipment: Grab bars in shower, Toilet raiser  Bathroom Accessibility: Accessible  Home Equipment: [employer], [employer], Walker, [address] Help From: Family  ADL Assistance: Independent  Homemaking Assistance: Independent  Homemaking Responsibilities: No  Ambulation Assistance: Independent  Transfer Assistance: Independent  Active : Yes  Mode of Transportation: Car  Occupation: Retired    OBJECTIVE:     PAIN: Christine Chimera / O2: Thermon Milind / Jack Frames / Stanford Stapler:   Pre Treatment:   Pain Assessment: None - Denies Pain      Post Treatment: 7/10, RN notified Vitals        Oxygen      Telemetry     RESTRICTIONS/PRECAUTIONS:                    GROSS EVALUATION: B LE Intact Impaired (Comments):   AROM []  Decreased global extension   PROM []    Strength []  Generalized weakness   Balance [] Posture: Poor  Sitting - Static: Fair  Sitting - Dynamic: Fair  Standing - Static: Fair, -   Posture [] Forward Head  Rounded Shoulders  Trunk Flexion  L trunk lean   Sensation [x]  Light touch Coordination []      Tone []     Edema []    Activity Tolerance [] Patient limited by pain    []      COGNITION/  PERCEPTION: Intact Impaired (Comments):   Orientation [x]     Vision [x]     Hearing [x]     Cognition  [x]       MOBILITY: I Mod I S SBA CGA Min Mod Max Total  NT x2 Comments:   Bed Mobility    Rolling [] [] [] [] [] [] [x] [] [] [] [x]    Supine to Sit [] [] [] [] [] [] [x] [] [] [] [x]    Scooting [] [] [] [] [] [] [x] [] [] [] [x]    Sit to Supine [] [] [] [] [] [] [x] [] [] [] [x]    Transfers    Sit to Stand [] [] [] [] [] [] [x] [x] [] [] [x]    Bed to Chair [] [] [] [] [] [] [] [] [] [x] []    Stand to Sit [] [] [] [] [] [] [x] [] [] [] [x]     [] [] [] [] [] [] [] [] [] [] []    I=Independent, Mod I=Modified Independent, S=Supervision, SBA=Standby Assistance, CGA=Contact Guard Assistance,   Min=Minimal Assistance, Mod=Moderate Assistance, Max=Maximal Assistance, Total=Total Assistance, NT=Not Tested    GAIT: I Mod I S SBA CGA Min Mod Max Total  NT x2 Comments:   Level of Assistance [] [] [] [] [] [] [] [] [] [x] []    Distance   N/a    DME N/A    Gait Quality N/A    Weightbearing Status      Stairs      I=Independent, Mod I=Modified Independent, S=Supervision, SBA=Standby Assistance, CGA=Contact Guard Assistance,   Min=Minimal Assistance, Mod=Moderate Assistance, Max=Maximal Assistance, Total=Total Assistance, NT=Not Tested    PLAN:   ACUTE PHYSICAL THERAPY GOALS:   (Developed with and agreed upon by patient and/or caregiver.)    (1.) Ethelda Abo  will move from supine to sit and sit to supine  with CONTACT GUARD ASSIST within 7 treatment day(s). (2.) Ethelda Abo will transfer from bed to chair and chair to bed with MINIMAL ASSIST using the least restrictive device within 7 treatment day(s). (3.) Ethelda Abo will ambulate with MINIMAL ASSIST for 50 feet with the least restrictive device within 7 treatment day(s).    (4.) Ethelda Abo will perform standing static and dynamic balance activities x 10 minutes with CONTACT GUARD ASSIST to improve safety within 7 treatment day(s). (5.) Mehdi Khanna will perform bilateral lower extremity exercises x 20 min for HEP with INDEPENDENCE to improve strength, endurance, and functional mobility within 7 treatment day(s). FREQUENCY AND DURATION: Daily for duration of hospital stay or until stated goals are met, whichever comes first.    THERAPY PROGNOSIS: Good    PROBLEM LIST:   (Skilled intervention is medically necessary to address:)  Decreased ADL/Functional Activities  Decreased Activity Tolerance  Decreased AROM/PROM  Decreased Balance  Decreased Gait Ability  Decreased Strength  Decreased Transfer Abilities  Increased Pain INTERVENTIONS PLANNED:   (Benefits and precautions of physical therapy have been discussed with the patient.)  Therapeutic Activity  Therapeutic Exercise/HEP  Neuromuscular Re-education  Gait Training  Education       TREATMENT:   EVALUATION: MODERATE COMPLEXITY: (Untimed Charge)    TREATMENT:   Co-Treatment PT/OT necessary due to patient's decreased overall endurance/tolerance levels, as well as need for high level skilled assistance to complete functional transfers/mobility and functional tasks  Therapeutic Activity (26 Minutes): Therapeutic activity included Rolling, Supine to Sit, Sit to Supine, Scooting, Lateral Scooting, Sitting balance , and Standing balance to improve functional Activity tolerance, Balance, Mobility, and Strength. TREATMENT GRID:  N/A    AFTER TREATMENT PRECAUTIONS: Bed, Bed/Chair Locked, Call light within reach, Needs within reach, RN notified, and Visitors at bedside    INTERDISCIPLINARY COLLABORATION:  RN/ PCT and OT/ KATE    EDUCATION: Education Given To: Patient; Family  Education Provided: Role of Therapy;Plan of Care;Precautions;Transfer Training; Fall Prevention Strategies; Equipment; Family Education; Energy Conservation  Education Method: Verbal  Barriers to Learning: None  Education Outcome: Verbalized understanding    TIME IN/OUT:  Time In: 1005  Time Out: 655 [facility]  Minutes: Harvinder BARROSO PT

## 2022-09-08 NOTE — PROGRESS NOTES
Hospitalist Progress Note   Admit Date:  2022  7:22 PM   Name:  Elisa Briceno   Age:  65 y.o. Sex:  male  :  1945   MRN:  [id number]   Room:  807/    Presenting Complaint: No chief complaint on file. Reason(s) for Admission: Lumbar discitis [facility] Course: Elisa Briceno is a 65 y.o. male with a past medical history significant for ischemic cardiomyopathy with ICD/pacemaker, EF of 30 to 35%, A. fib on Eliquis, diabetes, obesity, peripheral artery disease anemia, COPD with asthma, coronary artery disease, obstructive sleep apnea on CPAP with recent L2 L4 kyphoplasty who was recently charged from [facility] 2022 to [facility] after being admitted for UTI worsening weakness status post surgery with several falls. MRI of the lumbar spine and cervical spine were done and he was found to have a lumbar spine epidural abscess and symptoms abscess. His CRP was elevated at 12.4. He had noted osteomyelitis of the lower spine. His oral anticoagulation was held and he was placed on a heparin drip. He was also placed on vancomycin and ceftriaxone after IR drain abscess. He was followed by infectious disease and treated for 6 weeks with Vanco and Rocephin. He developed altered mental status. This was thought to be due to metabolic cause. Unfortunately he started having increasing complaints of back pain confusion. He was transferred to [facility] for ongoing therapy and antibiotic needs. Cardiology was consulted and his ICD was interrogated and found to be functioning well. A Tomas was placed due to his urinary retention. Urology was consulted. He had a complete altered mental status work-up and it was thought he had acute encephalopathy secondary to especially opiates and antipsychotics. He has been weaning off Zyprexa with plans to continue IV antibiotics for total of 6 weeks with estimated end of therapy 2022.   He was worsened  Will continue supportive care  Continue pain management  Therapy services on hold at this time we will reinstate when able to          Anemia  9/8/2022  Hemoglobin 8.4        Ischemic cardiomyopathy   ICD (implantable cardioverter-defibrillator) in place    Atrial fibrillation ([facility]  HTN (hypertension)  9/8/2022  BP is optimally controlled with Coreg. Pulmonary emphysema (HCC)  COPD (chronic obstructive pulmonary disease) ([facility]   Obstructive sleep apnea  9/8/2022  Patient is currently on room air. Continue ProAir HFA, Mucinex and Dulera. Nicotine patch TD. Cigarette nicotine dependence in remission  Chewing tobacco dependence  9/8/2022  Patient encouraged to stop chewing tobacco.  Cessation advised. Started on nicotine patch         Diabetic neuropathy (HCC)  Chronic pain of right knee  9/8/2022     Continue gabapentin  Continue pain meds           PAD (peripheral artery disease) ([address]  9/8/2022  Continue Plavix and Lipitor. Hyperlipidemia  9/8/2022  Continue Lipitor      DM (diabetes mellitus) type II, controlled, with peripheral vascular disorder ([facility]  9/8/2022  Blood glucose is optimally controlled, continue glipizide 5 mg p.o. daily, metformin 500 mg p.o. twice daily, Lantus 40 units subcu nightly along with sliding scale. MINI (acute kidney injury) ([facility]  9/8/2022  Resolved  Avoid nephrotoxins  Dose medications for his renal function     Leg cramping and muscle spasms  9/8/2022  Flexeril. Anticipated discharge needs:    Pending clinical course    I spent 36 minutes of time caring for this patient on the unit nearby or at bedside, and more than 50 percent was spent on coordination of care activities, and/or patient/family counseling regarding status and plan of care. Diet:  ADULT DIET; Regular; 3 carb choices (45 gm/meal)  ADULT ORAL NUTRITION SUPPLEMENT; Breakfast, Dinner; Other Oral Supplement;  Ensure Enlive; strawberry with breakfast, chocolate at supper. DVT PPx: Eliquis  Code status: Full Code    Hospital Problems:  Principal Problem:    Lumbar discitis  Active Problems:    ICD (implantable cardioverter-defibrillator) in place    Physical debility    Psoas muscle abscess (HCC)    Atrial fibrillation (HCC)    HTN (hypertension)    Pulmonary emphysema (HCC)    Severe obesity (BMI 35.0-39. 9) with comorbidity ([facility]    Diabetic neuropathy ([facility]    Sleep apnea    Ventricular tachycardia (HCC)    PAD (peripheral artery disease) ([facility]  Resolved Problems:    * No resolved hospital problems. *      Objective:   Patient Vitals for the past 24 hrs:   Temp Pulse Resp BP SpO2   09/08/22 0734 97.9 °F (36.6 °C) 63 19 (!) 155/71 93 %   09/08/22 0501 -- 60 18 -- 93 %   09/08/22 0217 97.9 °F (36.6 °C) 63 20 (!) 144/72 95 %   09/07/22 2310 -- 70 18 -- 91 %   09/07/22 2256 98.1 °F (36.7 °C) 62 18 (!) 141/67 93 %       Oxygen Therapy  SpO2: 93 %  Pulse Oximeter Device Mode: Intermittent  Pulse Oximeter Device Location: Right, Finger  O2 Device: PAP (positive airway pressure)  FiO2 : 28 %  O2 Flow Rate (L/min): 2 L/min  O2 Delivery Method: CPAP/Bi-PAP mask    Estimated body mass index is 31.27 kg/m² as calculated from the following:    Height as of 9/3/22: 6' 1\" (1.854 m). Weight as of 9/3/22: 237 lb (107.5 kg). Intake/Output Summary (Last 24 hours) at 9/8/2022 0740  Last data filed at 9/8/2022 0502  Gross per 24 hour   Intake --   Output 300 ml   Net -300 ml         Physical Exam:     Blood pressure (!) 155/71, pulse 63, temperature 97.9 °F (36.6 °C), resp. rate 19, SpO2 93 %. General:    Alert, awake, NAD, on room air  Head:  Normocephalic, atraumatic  Eyes:  Sclerae appear normal.  Pupils equally round. ENT:  Nares appear normal, no drainage. Moist oral mucosa  Neck:  No restricted ROM. Trachea midline   CV:   RRR. No m/r/g. No jugular venous distension. Lungs:   CTAB. No wheezing, rhonchi, or rales. Symmetric expansion. Abdomen: Bowel sounds present.   Soft, nontender, nondistended. Extremities: No cyanosis or clubbing. No edema  Skin:     No rashes and normal coloration. Warm and dry.     Neuro:  GCS 15, cranial nerves intact, no motor deficit, mild sensory loss in bilateral lower extremity  Psych:  AOx3, mood and affect appropriate  Point tenderness in lower back appreciated    I have personally reviewed labs and tests showing:  Recent Labs:  Recent Results (from the past 48 hour(s))   POCT Glucose    Collection Time: 09/06/22 11:11 AM   Result Value Ref Range    POC Glucose 113 (H) 65 - 100 mg/dL    Performed by: Bita    POCT Glucose    Collection Time: 09/06/22  4:08 PM   Result Value Ref Range    POC Glucose 107 (H) 65 - 100 mg/dL    Performed by: Bita    POCT Glucose    Collection Time: 09/06/22  9:07 PM   Result Value Ref Range    POC Glucose 146 (H) 65 - 100 mg/dL    Performed by: Marion    POCT Glucose    Collection Time: 09/07/22  6:43 AM   Result Value Ref Range    POC Glucose 100 65 - 100 mg/dL    Performed by: Marion    Procalcitonin    Collection Time: 09/07/22  9:15 AM   Result Value Ref Range    Procalcitonin <0.05 0.00 - 0.49 ng/mL   Vancomycin Level, Trough    Collection Time: 09/07/22  9:15 AM   Result Value Ref Range    Vancomycin Tr 12.2 5 - 20 ug/mL   C-Reactive Protein    Collection Time: 09/07/22  9:15 AM   Result Value Ref Range    CRP 4.8 (H) 0.0 - 0.9 mg/dL   Sedimentation Rate    Collection Time: 09/07/22 11:01 AM   Result Value Ref Range    Sed Rate, Automated 4 (L) 15 mm/hr   POCT Glucose    Collection Time: 09/07/22 11:17 AM   Result Value Ref Range    POC Glucose 98 65 - 100 mg/dL    Performed by: [place] ([place])    POCT Glucose    Collection Time: 09/07/22  4:42 PM   Result Value Ref Range    POC Glucose 105 (H) 65 - 100 mg/dL    Performed by: [place] ([place])    POCT Glucose    Collection Time: 09/07/22  8:56 PM   Result Value Ref Range    POC Glucose 102 (H) 65 - 100 mg/dL    Performed by: KelechiorLCJohnPCT    POCT Glucose    Collection Time: 09/08/22  2:07 AM   Result Value Ref Range    POC Glucose 96 65 - 100 mg/dL    Performed by: Arturo        I have personally reviewed imaging studies showing:   Other Studies:  No orders to display       Current Meds:  Current Facility-Administered Medications   Medication Dose Route Frequency    acetaminophen (TYLENOL) tablet 650 mg  650 mg Oral TID    fluticasone (FLONASE) 50 MCG/ACT nasal spray 1 spray  1 spray Each Nostril Daily    furosemide (LASIX) tablet 40 mg  40 mg Oral Daily    gabapentin (NEURONTIN) capsule 300 mg  300 mg Oral TID    glipiZIDE (GLUCOTROL) tablet 5 mg  5 mg Oral QAM AC    guaiFENesin (MUCINEX) extended release tablet 1,200 mg  1,200 mg Oral BID    insulin glargine (LANTUS) injection vial 14 Units  14 Units SubCUTAneous Nightly    insulin lispro (HUMALOG) injection vial 0-16 Units  0-16 Units SubCUTAneous TID WC    insulin lispro (HUMALOG) injection vial 0-4 Units  0-4 Units SubCUTAneous Nightly    latanoprost (XALATAN) 0.005 % ophthalmic solution 1 drop  1 drop Both Eyes Nightly    lidocaine 4 % external patch 1 patch  1 patch TransDERmal Daily    albuterol sulfate HFA (PROVENTIL;VENTOLIN;PROAIR) 108 (90 Base) MCG/ACT inhaler 2 puff  2 puff Inhalation BID    melatonin tablet 6 mg  6 mg Oral Nightly    metFORMIN (GLUCOPHAGE) tablet 500 mg  500 mg Oral BID WC    miconazole (MICOTIN) 2 % powder   Topical BID    mometasone-formoterol (DULERA) 100-5 MCG/ACT inhaler 2 puff  2 puff Inhalation BID    montelukast (SINGULAIR) tablet 10 mg  10 mg Oral Nightly    naproxen (NAPROSYN) tablet 500 mg  500 mg Oral BID WC    pantoprazole (PROTONIX) tablet 40 mg  40 mg Oral BID AC    polyethylene glycol (GLYCOLAX) packet 17 g  17 g Oral Daily    sennosides-docusate sodium (SENOKOT-S) 8.6-50 MG tablet 2 tablet  2 tablet Oral Daily    sodium chloride flush 0.9 % injection 10 mL  10 mL IntraVENous BID    amiodarone (CORDARONE)

## 2022-09-08 NOTE — PROGRESS NOTES
SPEECH PATHOLOGY NOTE:    Speech therapy consult received and appreciated. Attempted to see patient for bedside swallow evaluation, however working with PT. Will re attempt at later time/date as schedule permits.        Tonya [address] CCC-SLP

## 2022-09-08 NOTE — CARE COORDINATION
MSN, CM:  patient arrived overnight from 9th floor r/t uncontrolled pain. Patient states everything is the same since last admission. Patient with osteomyelitis of the spine with abscess. Patient to have possible drain of abscess and SUNDEEP drain placed on Monday. PT and OT will be consulted for evaluation and recommendations. Case Management will continue to follow for any discharge needs. [facility] had plans for discharge on Saturday 9/10/2022.         09/08/22 1123   Service Assessment   Patient Orientation Alert and Oriented   Cognition Alert   History Provided By Patient   Primary Caregiver Self   Support Systems Spouse/Significant Other   Patient's Healthcare Decision Maker is: Legal Next of Kin   PCP Verified by CM Yes   Last Visit to PCP Within last 3 months   Prior Functional Level Independent in ADLs/IADLs   Can patient return to prior living arrangement Yes   Ability to make needs known: Good   Family able to assist with home care needs: Yes   Would you like for me to discuss the discharge plan with any other family members/significant others, and if so, who? Yes  (wife)   Financial Resources Medicare   Social/Functional History   Lives With Spouse   Type of [facility] Two level   Home Access Stairs to enter with rails   Entrance Stairs - Number of Steps 4   Entrance Stairs - Rails Left   Bathroom Shower/Tub Walk-in shower   Bathroom Toilet Handicap height   Bathroom Equipment Grab bars in shower; Toilet raiser   [address]; Wheelchair-manual;Walker, rolling   Receives Help From Family   ADL Assistance Independent   [address] [facility] [address] Responsibilities No   Ambulation Assistance Independent   Transfer Assistance Independent   Active  Yes   Mode of Transportation Car   Occupation Retired   Discharge Planning   Type of [address] Prior To Admission C-pap   DME Cane;Cpap;Glucometer; Wheelchair;Walker   DME Ordered? No   Potential Assistance Purchasing Medications No   Type of Home Care Services OT;PT;Nursing Services   Patient expects to be discharged to: House   One/Two Story Residence Two story   # of Interior Steps 15   Lift Chair Available No   History of falls?  1

## 2022-09-08 NOTE — PROGRESS NOTES
SPEECH LANGUAGE PATHOLOGY: DYSPHAGIA  Initial Assessment and Discharge    NAME: Bradley Weaver  : 1945  MRN: [id number]    ADMISSION DATE: 2022  PRIMARY DIAGNOSIS: Lumbar discitis  Lumbar discitis [M46.46]    ICD-10: Treatment Diagnosis: R13.11 Dysphagia, Oral Phase    RECOMMENDATIONS   Diet:  Diet Solids Recommendation: Regular  Liquid Consistency Recommendation: Thin    Medications: PO     Compensatory Swallowing Strategies: Upright as possible for all oral intake;Remain upright for 30-45 minutes after meals;Eat/Feed slowly   Patient continues to require skilled intervention: No ST needs during acute stay. D/C Recommendations: Ongoing speech therapy is recommended at next level of care (for cognitive linguistic functioning)     ASSESSMENT    Dysphagia Diagnosis: Swallow function appears [facility]    No dysphagia treatment indicated at this time. Please reconsult if new concerns arise.      GENERAL    History of Present Injury/Illness: Mr. Radha Gillespie  has a past medical history of Acute respiratory failure with hypoxia ([address] MINI (acute kidney injury) ([address] MINI (acute kidney injury) ([address] MINI (acute kidney injury) ([address] Allergic rhinitis, Asthma, Benign essential hypertension, Benign neoplasm of colon, CAD (coronary artery disease), CAD (coronary artery disease), CAP (community acquired pneumonia), Cardiomyopathy ([address] Complicated wound infection, COPD (chronic obstructive pulmonary disease) with emphysema ([address] COPD exacerbation ([address] COVID-19, Diabetes mellitus type 2, controlled ([address] Diabetic neuropathy ([address] Disruption of wound, unspecified, Encounter for long-term (current) use of other high-risk medications, Extremity atherosclerosis with intermittent claudication ([address] H/O endarterectomy, Hiatal hernia, History of 2019 novel coronavirus disease (COVID-19), Hyperglycemia due to type 1 diabetes mellitus ([address] Hyperlipidemia, ICD (implantable cardioverter-defibrillator) in place, Monomorphic ventricular tachycardia ([address] Myocardial infarction ([address] Myocardial infarction ([address] Obesity, Orthostatic hypotension, Osteoarthritis, Peripheral vascular disease ([address] PNA (pneumonia), PVC (premature ventricular contraction), Rash, Seroma complicating a procedure, Sleep apnea, Toe laceration, Type 2 diabetes with nephropathy ([address] Uncontrolled type 2 diabetes mellitus ([address] and Vertiginous syndromes and other disorders of vestibular system. Buckgregorio Stringer He also  has a past surgical history that includes Cardiac catheterization (12/05/2018); Coronary angioplasty with stent (12/05/2018); Coronary angioplasty with stent (1999); vascular surgery (11/5/14); vascular surgery (9/11/14); pr cardiac surg procedure unlist (1999); pr abdomen surgery proc unlisted (1960); and Spine surgery (N/A, 7/19/2022). Subjective: wife at bedside. Behavior/Cognition: Alert; Cooperative;Pleasant mood  Communication Observation: Functional  Follows Directions: Complex    Prior Dysphagia History: reports history hiatal hernia, GERD     Current Diet : Regular  Current Liquid Diet : Thin  Prior Speech therapy: patient admitted from [facility]. Followed by SLP during [facility] stay for cognitive linguistic deficits. Respiratory Status: Room air              Pain:   Patient does not c/o pain                                        OBJECTIVE    Patient Positioning: Partially reclined   Oral Motor   Labial: No impairment  Oral Hygiene: Moist;Clean  Oral Hygiene Comments: dentition fair. patient with dipping tobacco in upon entry  Lingual: No impairment  Dentition: Adequate        Baseline Vocal Quality: Normal    Oropharyngeal Phase:  Assessment Method(s): Observation;Palpation  Consistency Presented: Regular; Thin  How Presented: Self-fed/presented;Straw;Successive Swallows;Cup/gulp  Bolus Acceptance: No impairment  Bolus Formation/Control: No impairment  Propulsion: No impairment  Oral Residue: None  Initiation of Swallow: No impairment  Laryngeal Elevation: Functional  Aspiration Signs/Symptoms: None  Pharyngeal Phase Characteristics: No impairment, issues, or problems, voice clear. Patient seen with regular meal tray. Functional oral prep/clearance. No overt s/sx aspiration with serial gulps of thin liquid and solids. PLAN    Duration/Frequency: No treatment indicated at this time. Recommend at next level of care for cognitive linguistic functioning. Dysphagia Outcome and Severity Scale (ANGELINA)  Dysphagia Outcome Severity Scale: Level 7: Normal in all situations  Interpretation of Tool: The Dysphagia Outcome and Severity Scale (ANGELINA) is a simple, easy-to-use, 7-point scale developed to systematically rate the functional severity of dysphagia based on objective assessment and make recommendations for diet level, independence level, and type of nutrition. Normal(7), Functional(6), Mild(5), Mild-Moderate(4), Moderate(3), Moderate-Severe(2), Severe(1)    Speech Therapy Prognosis  Prognosis: Excellent    Education: Patient, Family member  Patient Education: diet consistencies, precautions.   Patient Education Response: Verbalizes understanding, Demonstrated understanding    PRECAUTIONS/ALLERGIES: Azithromycin and Oxaprozin   Safety Devices in place: Yes  Type of devices: Call light within reach, Left in bed    Therapy Time  SLP Individual Minutes  Time In: 1210  Time Out: 9884  Minutes: Mac Alcaraz [place]  9/8/2022 12:54 PM

## 2022-09-08 NOTE — CARE COORDINATION
Team conference today. Pt having medical decline with worsening psoas abscess. Pt unable to make progress with therapy. Family training cancelled today at pt unable to tolerate. Pt to be transferred back to acute care with possible need for IR to drain abscess in 4-5 days. Plan was to discharge pt home on Saturday, 9/10 after all IV ABT complete. DME needs included w/c and bariatric BSC likely. HH needs not addressed. Spouse was having portable ramp installed. Now there are concerns for spouse ability to care for pt. With pt decline he would likely need hospital bed and clive lift or SNF pending progress. Met with pt and spouse, Lilo Dalton, at bedside and both agreeable with pt returning to acute care. Acute inpatient CM to take over CM needs once admitted to [facility].  Hospital transport to transport pt to [facility].        09/07/22 [address] Discharge Acute Hospital   Mode of Transport at Discharge Other (see comment)  (In house hospital transport to [facility])

## 2022-09-08 NOTE — CARE COORDINATION
Opened chart for [facility] outreach. Patient currently noted to be inpatient. No outreach indicated at this time. Patient will be reassigned pending discharge disposition.

## 2022-09-08 NOTE — PROGRESS NOTES
OCCUPATIONAL THERAPY Initial Assessment, Daily Note, and AM       OT Visit Days: 1  Acknowledge Orders  Time  OT Charge Capture  Rehab Caseload Tracker      Ronan Perez is a 65 y.o. male   PRIMARY DIAGNOSIS: Lumbar discitis  Lumbar discitis [M46.46]       Reason for Referral: Generalized Muscle Weakness (M62.81)  Other abnormalities of gait and mobility (R26.89)  History of falling (Z91.81)  Inpatient: Payor: MEDICARE / Plan: MEDICARE PART A AND B / Product Type: *No Product type* /     ASSESSMENT:     REHAB RECOMMENDATIONS:   Recommendation to date pending progress:  Setting:  Inpatient Rehab Facility    Equipment:    To Be Determined     ASSESSMENT:  Mr. Fredrick Bautista is a 66 y/o M presenting with lumbar discitis. Pt supine on entry on RA with spouse present. Pt A/O x 4. Applicable PMHx: P6-3 kyphoplasty, pacemaker, EF 30-35%, COPD, asthma, DM, SHERI. Pt denied light headedness, dizziness or SOB. Evaluation limited d/t significantly increased pain EOB and decreased sitting balance. Pt reports 1 fall(s) in past 6 months. PLOF Mod I. DME present in home; RW, 4WRW, high commode. Pt log roll education reviewed and log roll utilized to minimize pain/discomfort. Pt currently CGA grooming seated, SBA supine, Mod A with UB ADLs, Max A with LB ADLs, Max A for toileting and Max-Mod A bed mobility and sit<->stand x 2 with RW. Rest breaks utilized as needed throughout session. Pt presents with deficits in ADLs, functional t/fs, household mobility for ADLs and activity tolerance compared to reported PLOF. Pt tolerated session well. Pt presents pleasant and cooperative with increased pain. Pt exhibits good rehab potential with decreased pain. Pt would benefit from continued skilled OT services for duration of hospital stay and after t/f to next level of care or until all stated goals met.       [address] [facility] [address] [address] AM-PAC 6 Clicks Daily Activity Inpatient Short Form:    AM-PAC Daily Activity Inpatient   How much help for putting on and taking off regular lower body clothing?: A Lot  How much help for Bathing?: A Lot  How much help for Toileting?: A Lot  How much help for putting on and taking off regular upper body clothing?: A Lot  How much help for taking care of personal grooming?: A Little  How much help for eating meals?: None  AM-PAC Inpatient Daily Activity Raw Score: 15  AM-PAC Inpatient ADL T-Scale Score : 34.69  ADL Inpatient CMS 0-100% Score: 56.46  ADL Inpatient CMS G-Code Modifier : CK           SUBJECTIVE:     Mr. Casey Vargas states, \"It hurts in my hips. .. all around the hips\"     Social/Functional Lives With: Spouse  Type of Home: House  Home Layout: Two level  Home Access: Stairs to enter with rails  Entrance Stairs - Number of Steps: 4  Entrance Stairs - Rails: Left  Bathroom Shower/Tub: Walk-in shower  Bathroom Toilet: Handicap height  Bathroom Equipment: Grab bars in shower, Toilet raiser  Bathroom Accessibility: Accessible  Home Equipment: [employer], [address], [place], [address] Help From: Family  ADL Assistance: Independent  Homemaking Assistance: Independent  Homemaking Responsibilities: No  Ambulation Assistance: Independent  Transfer Assistance: Independent  Active : Yes  Mode of Transportation: Car  Occupation: Retired    OBJECTIVE:     Serene Salvador / Shady Hurley / Kaylyn Fluke: None    RESTRICTIONS/PRECAUTIONS:       PAIN: [place] / O2:   Pre Treatment:   Pain Assessment: None - Denies Pain at rest supine      Post Treatment: 8/10 and slowly decreasing with pt supine, NSG notified       Vitals          Oxygen            GROSS EVALUATION: INTACT IMPAIRED   (See Comments)   UE AROM [x] []WFL for BADLs supine, difficulty assessing EOB d/t increased pain   UE PROM [] []NT   Strength [] LUE Strength  Gross LUE Strength: WFL  LUE Strength Comment: 3+/5  RUE Strength  Gross RUE Strength: WFL  RUE Strength Comment: 3+/5for BADLs supine, difficulty assessing EOB d/t increased pain     Posture / Balance [] Sitting - Static: Fair  Sitting - Dynamic: Fair  Standing - Static: Fair, -   Sensation [x]     Coordination [x]       Tone []  N/A     Edema [] N/A   Activity Tolerance [] Patient limited by pain     Hand Dominance R [x] L []      COGNITION/  PERCEPTION: INTACT IMPAIRED   (See Comments)   Orientation [x]     Vision [x]     Hearing [x]     Cognition  [x]     Perception []       MOBILITY: I Mod I S SBA CGA Min Mod Max Total  NT x2 Comments:   Bed Mobility    Rolling [] [] [] [] [] [] [x] [] [] [] [x]    Supine to Sit [] [] [] [] [] [] [x] [] [] [] [x]    Scooting [] [] [] [] [] [] [x] [] [] [] [x]    Sit to Supine [] [] [] [] [] [] [x] [] [] [] [x]    Transfers    Sit to Stand [] [] [] [] [] [] [x] [x] [] [] [x]    Bed to Chair [] [] [] [] [] [] [] [] [] [x] []    Stand to Sit [] [] [] [] [] [] [x] [] [] [] [x]    Tub/Shower [] [] [] [] [] [] [] [] [] [] []     Toilet [] [] [] [] [] [] [] [] [] [] []      [] [] [] [] [] [] [] [] [] [] []    I=Independent, Mod I=Modified Independent, S=Supervision/Setup, SBA=Standby Assistance, CGA=Contact Guard Assistance, Min=Minimal Assistance, Mod=Moderate Assistance, Max=Maximal Assistance, Total=Total Assistance, NT=Not Tested    ACTIVITIES OF DAILY LIVING: I Mod I S SBA CGA Min Mod Max Total NT Comments   BASIC ADLs:              Upper Body Bathing  [] [] [] [] [] [] [x] [] [] []    Lower Body Bathing [] [] [] [] [] [] [] [x] [] []    Toileting [] [] [] [] [] [] [] [x] [] []    Upper Body Dressing [] [] [] [] [] [] [x] [] [] []    Lower Body Dressing [] [] [] [] [] [] [] [x] [] [] Renuka Sinning socks   Feeding [] [x] [] [] [] [] [] [] [] []    Grooming [] [] [] [x] [x] [] [] [] [] [] CGA EOB, SBA supine, Face washing EOB   Personal Device Care [] [] [] [] [] [] [] [] [] [x]    Functional Mobility [] [] [] [] [] [] [x] [x] [] [] X2 Bed mobility and sit<->stand x2 EOB with RW   I=Independent, Mod I=Modified Independent, S=Supervision/Setup, SBA=Standby Assistance, CGA=Contact Guard Assistance, Min=Minimal Assistance, Mod=Moderate Assistance, Max=Maximal Assistance, Total=Total Assistance, NT=Not Tested    PLAN:     FREQUENCY/DURATION   OT Plan of Care: 5 times/week for duration of hospital stay or until stated goals are met, whichever comes first.    ACUTE OCCUPATIONAL THERAPY GOALS:   (Developed with and agreed upon by patient and/or caregiver.)  1. Patient will complete lower body bathing and dressing with CGA and adaptive equipment as   needed. 2. Patient will completed upper body bathing and dressing with Mod I and adaptive equipment as needed. 3. Patient will complete grooming standing at Sink with CGA and adaptive equipment as needed. 4. Patient will complete toileting with CGA and adaptive equipment as needed. 5. Patient will tolerate 20 minutes of OT treatment EOB/OOB with 1-2 rest breaks to increase activity tolerance for ADLs. 6. Patient will complete functional transfers with CGA and adaptive equipment as needed.      Timeframe: 7 visits         PROBLEM LIST:   (Skilled intervention is medically necessary to address:)  Decreased ADL/Functional Activities  Decreased Activity Tolerance  Decreased AROM/PROM  Decreased Balance  Decreased Coordination  Decreased Gait Ability  Decreased Strength  Decreased Transfer Abilities  Increased Pain   INTERVENTIONS PLANNED:  (Benefits and precautions of occupational therapy have been discussed with the patient.)  Self Care Training  Therapeutic Activity  Therapeutic Exercise/HEP  Neuromuscular Re-education  Manual Therapy  Education         TREATMENT:     EVALUATION: MODERATE COMPLEXITY: (Untimed Charge)    TREATMENT:   Co-Treatment PT/OT necessary due to patient's decreased overall endurance/tolerance levels, as well as need for high level skilled assistance to complete functional transfers/mobility and functional tasks  Neuromuscular Re-education (15 Minutes): Neuromuscular Re-education included Balance Training, Coordination training, Functional

## 2022-09-08 NOTE — CARE COORDINATION
DILLON, CM:  patient was transferred from 9th floor for uncontrolled pain. 09/08/22 1116   Readmission Assessment   Number of Days since last admission? 8-30 days   Previous Disposition Acute Rehab  (9th floor)   Who is being Interviewed Patient   What was the patient's/caregiver's perception as to why they think they needed to return back to the hospital? Other (Comment)  (uncontrolled pain)   Did you visit your Primary Care Physician after you left the hospital, before you returned this time? No  (Was on 9th floor)   Did you see a specialist, such as Cardiac, Pulmonary, Orthopedic Physician, etc. after you left the hospital? Yes  (on 9th floor)   Who advised the patient to return to the hospital? Physician  (Dr. Clemens Lips 9th floor)   Does the patient report anything that got in the way of taking their medications? No   In our efforts to provide the best possible care to you and others like you, can you think of anything that we could have done to help you after you left the hospital the first time, so that you might not have needed to return so soon?  Other (Comment)

## 2022-09-09 LAB
GLUCOSE BLD STRIP.AUTO-MCNC: 109 MG/DL (ref 65–100)
GLUCOSE BLD STRIP.AUTO-MCNC: 113 MG/DL (ref 65–100)
GLUCOSE BLD STRIP.AUTO-MCNC: 113 MG/DL (ref 65–100)
GLUCOSE BLD STRIP.AUTO-MCNC: 114 MG/DL (ref 65–100)
GLUCOSE BLD STRIP.AUTO-MCNC: 115 MG/DL (ref 65–100)
SERVICE CMNT-IMP: ABNORMAL

## 2022-09-09 PROCEDURE — 82962 GLUCOSE BLOOD TEST: CPT

## 2022-09-09 PROCEDURE — 76937 US GUIDE VASCULAR ACCESS: CPT

## 2022-09-09 PROCEDURE — 94640 AIRWAY INHALATION TREATMENT: CPT

## 2022-09-09 PROCEDURE — 6370000000 HC RX 637 (ALT 250 FOR IP): Performed by: HOSPITALIST

## 2022-09-09 PROCEDURE — 6360000002 HC RX W HCPCS: Performed by: HOSPITALIST

## 2022-09-09 PROCEDURE — 6370000000 HC RX 637 (ALT 250 FOR IP): Performed by: INTERNAL MEDICINE

## 2022-09-09 PROCEDURE — 2580000003 HC RX 258: Performed by: INTERNAL MEDICINE

## 2022-09-09 PROCEDURE — 1100000003 HC PRIVATE W/ TELEMETRY

## 2022-09-09 PROCEDURE — 1100000000 HC RM PRIVATE

## 2022-09-09 PROCEDURE — 97110 THERAPEUTIC EXERCISES: CPT

## 2022-09-09 PROCEDURE — 97530 THERAPEUTIC ACTIVITIES: CPT

## 2022-09-09 PROCEDURE — 2500000003 HC RX 250 WO HCPCS: Performed by: HOSPITALIST

## 2022-09-09 PROCEDURE — 97535 SELF CARE MNGMENT TRAINING: CPT

## 2022-09-09 RX ADMIN — AMIODARONE HYDROCHLORIDE 200 MG: 200 TABLET ORAL at 08:14

## 2022-09-09 RX ADMIN — ALBUTEROL SULFATE 2 PUFF: 90 AEROSOL, METERED RESPIRATORY (INHALATION) at 19:41

## 2022-09-09 RX ADMIN — CARVEDILOL 6.25 MG: 3.12 TABLET, FILM COATED ORAL at 08:13

## 2022-09-09 RX ADMIN — ANTI-FUNGAL POWDER MICONAZOLE NITRATE TALC FREE: 1.42 POWDER TOPICAL at 21:26

## 2022-09-09 RX ADMIN — HYDROMORPHONE HYDROCHLORIDE 0.5 MG: 1 INJECTION, SOLUTION INTRAMUSCULAR; INTRAVENOUS; SUBCUTANEOUS at 21:31

## 2022-09-09 RX ADMIN — PANTOPRAZOLE SODIUM 40 MG: 40 TABLET, DELAYED RELEASE ORAL at 06:28

## 2022-09-09 RX ADMIN — LATANOPROST 1 DROP: 50 SOLUTION OPHTHALMIC at 21:27

## 2022-09-09 RX ADMIN — CARVEDILOL 6.25 MG: 3.12 TABLET, FILM COATED ORAL at 16:50

## 2022-09-09 RX ADMIN — GUAIFENESIN 1200 MG: 600 TABLET ORAL at 21:21

## 2022-09-09 RX ADMIN — HYDROMORPHONE HYDROCHLORIDE 0.5 MG: 1 INJECTION, SOLUTION INTRAMUSCULAR; INTRAVENOUS; SUBCUTANEOUS at 15:06

## 2022-09-09 RX ADMIN — GLIPIZIDE 5 MG: 5 TABLET ORAL at 08:13

## 2022-09-09 RX ADMIN — NAPROXEN 500 MG: 250 TABLET ORAL at 08:14

## 2022-09-09 RX ADMIN — ALBUTEROL SULFATE 2 PUFF: 90 AEROSOL, METERED RESPIRATORY (INHALATION) at 07:56

## 2022-09-09 RX ADMIN — APIXABAN 5 MG: 5 TABLET, FILM COATED ORAL at 21:21

## 2022-09-09 RX ADMIN — ACETAMINOPHEN 650 MG: 325 TABLET ORAL at 21:21

## 2022-09-09 RX ADMIN — GABAPENTIN 300 MG: 300 CAPSULE ORAL at 08:13

## 2022-09-09 RX ADMIN — METFORMIN HYDROCHLORIDE 500 MG: 500 TABLET ORAL at 16:50

## 2022-09-09 RX ADMIN — SODIUM CHLORIDE, PRESERVATIVE FREE 10 ML: 5 INJECTION INTRAVENOUS at 08:15

## 2022-09-09 RX ADMIN — MONTELUKAST 10 MG: 10 TABLET, FILM COATED ORAL at 21:21

## 2022-09-09 RX ADMIN — NAPROXEN 500 MG: 250 TABLET ORAL at 16:50

## 2022-09-09 RX ADMIN — METFORMIN HYDROCHLORIDE 500 MG: 500 TABLET ORAL at 08:14

## 2022-09-09 RX ADMIN — PANTOPRAZOLE SODIUM 40 MG: 40 TABLET, DELAYED RELEASE ORAL at 15:06

## 2022-09-09 RX ADMIN — OXYCODONE 5 MG: 5 TABLET ORAL at 08:14

## 2022-09-09 RX ADMIN — ACETAMINOPHEN 650 MG: 325 TABLET ORAL at 15:06

## 2022-09-09 RX ADMIN — FERROUS GLUCONATE 324 MG: 324 TABLET ORAL at 08:14

## 2022-09-09 RX ADMIN — ACETAMINOPHEN 650 MG: 325 TABLET ORAL at 08:13

## 2022-09-09 RX ADMIN — SODIUM CHLORIDE, PRESERVATIVE FREE 10 ML: 5 INJECTION INTRAVENOUS at 21:22

## 2022-09-09 RX ADMIN — INSULIN GLARGINE 14 UNITS: 100 INJECTION, SOLUTION SUBCUTANEOUS at 21:19

## 2022-09-09 RX ADMIN — APIXABAN 5 MG: 5 TABLET, FILM COATED ORAL at 08:14

## 2022-09-09 RX ADMIN — CLOPIDOGREL BISULFATE 75 MG: 75 TABLET ORAL at 08:14

## 2022-09-09 RX ADMIN — SENNOSIDES AND DOCUSATE SODIUM 2 TABLET: 8.6; 5 TABLET ORAL at 08:13

## 2022-09-09 RX ADMIN — GABAPENTIN 300 MG: 300 CAPSULE ORAL at 15:06

## 2022-09-09 RX ADMIN — CALCITONIN SALMON 1 SPRAY: 200 SPRAY, METERED NASAL at 08:29

## 2022-09-09 RX ADMIN — FUROSEMIDE 40 MG: 40 TABLET ORAL at 08:14

## 2022-09-09 RX ADMIN — ANTI-FUNGAL POWDER MICONAZOLE NITRATE TALC FREE: 1.42 POWDER TOPICAL at 08:25

## 2022-09-09 RX ADMIN — GABAPENTIN 300 MG: 300 CAPSULE ORAL at 21:21

## 2022-09-09 RX ADMIN — HYDROMORPHONE HYDROCHLORIDE 0.25 MG: 1 INJECTION, SOLUTION INTRAMUSCULAR; INTRAVENOUS; SUBCUTANEOUS at 11:14

## 2022-09-09 RX ADMIN — MAGNESIUM HYDROXIDE 30 ML: 2400 SUSPENSION ORAL at 17:02

## 2022-09-09 RX ADMIN — ATORVASTATIN CALCIUM 20 MG: 20 TABLET, FILM COATED ORAL at 21:21

## 2022-09-09 RX ADMIN — TRAZODONE HYDROCHLORIDE 25 MG: 50 TABLET ORAL at 21:21

## 2022-09-09 RX ADMIN — FLUTICASONE PROPIONATE 1 SPRAY: 50 SPRAY, METERED NASAL at 08:15

## 2022-09-09 RX ADMIN — GUAIFENESIN 1200 MG: 600 TABLET ORAL at 08:13

## 2022-09-09 RX ADMIN — Medication 6 MG: at 21:21

## 2022-09-09 ASSESSMENT — PAIN DESCRIPTION - DESCRIPTORS
DESCRIPTORS: SHARP;SHOOTING
DESCRIPTORS: SHOOTING;SPASM;SHARP
DESCRIPTORS: SHOOTING;SPASM;SHARP
DESCRIPTORS: SPASM
DESCRIPTORS: SHARP

## 2022-09-09 ASSESSMENT — PAIN SCALES - GENERAL
PAINLEVEL_OUTOF10: 5
PAINLEVEL_OUTOF10: 10
PAINLEVEL_OUTOF10: 2
PAINLEVEL_OUTOF10: 7
PAINLEVEL_OUTOF10: 0
PAINLEVEL_OUTOF10: 0
PAINLEVEL_OUTOF10: 10

## 2022-09-09 ASSESSMENT — PAIN DESCRIPTION - LOCATION
LOCATION: BACK
LOCATION: SACRUM
LOCATION: BACK

## 2022-09-09 ASSESSMENT — PAIN DESCRIPTION - ORIENTATION: ORIENTATION: LEFT;RIGHT

## 2022-09-09 NOTE — PROGRESS NOTES
Hospitalist Progress Note   Admit Date:  2022  7:22 PM   Name:  Amna Castro   Age:  65 y.o. Sex:  male  :  1945   MRN:  [id number]   Room:  [facility]/    Presenting Complaint: No chief complaint on file. Reason(s) for Admission: Lumbar discitis [facility] Course: Amna Castro is a 65 y.o. male with a past medical history significant for ischemic cardiomyopathy with ICD/pacemaker, EF of 30 to 35%, A. fib on Eliquis, diabetes, obesity, peripheral artery disease anemia, COPD with asthma, coronary artery disease, obstructive sleep apnea on CPAP with recent L2 L4 kyphoplasty who was recently charged from [facility] 2022 to [facility] after being admitted for UTI worsening weakness status post surgery with several falls. MRI of the lumbar spine and cervical spine were done and he was found to have a lumbar spine epidural abscess and symptoms abscess. His CRP was elevated at 12.4. He had noted osteomyelitis of the lower spine. His oral anticoagulation was held and he was placed on a heparin drip. He was also placed on vancomycin and ceftriaxone after IR drain abscess. He was followed by infectious disease and treated for 6 weeks with Vanco and Rocephin. He developed altered mental status. This was thought to be due to metabolic cause. Unfortunately he started having increasing complaints of back pain confusion. He was transferred to [place] [address] for ongoing therapy and antibiotic needs. Cardiology was consulted and his ICD was interrogated and found to be functioning well. A Tomas was placed due to his urinary retention. Urology was consulted. He had a complete altered mental status work-up and it was thought he had acute encephalopathy secondary to especially opiates and antipsychotics. He has been weaning off Zyprexa with plans to continue IV antibiotics for total of 6 weeks with estimated end of therapy 2022.   He was transferred to inpatient rehab floor on August 26, 2022. He was continued on antibiotics he continued to have lower back pain and has been poorly participative in therapy over the last few days. MRI of the spine done today showed an apparent worsening osteomyelitis and psoas muscle abscess. It also showed compression of the cauda equina nerve roots at multiple levels in the lumbar spine as before. Multilevel degenerative disc disease and facet arthropathy. Bilateral psoas muscle myositis with new left psoas muscle abscess at the level of L3-L4. Because of the abscesses ID was sultan. And recommended stopping all antibiotics at this time and getting IR to aspirate the abscess for culture likely on Monday of next week. Dr. Cristina Meier was also consulted and felt no surgical role indicated at this time and that his degenerative stenosis can be addressed once he is infection free. As the patient is no longer able to participate in therapy it was thought best to transfer him back to the hospital.         Subjective & 24hr Events (09/09/22): Patient seen at bedside, resting in bed comfortably. He is alert and awake, reports feeling weak. Denies any fever, chills, nausea vomiting, diarrhea. He does report of constipation. Discussed with patient and his wife about culture negative discitis/osteomyelitis of lumbar spine. All questions answered to best my ability. ROS:  10 point review of system is negative except for what mentioned above. Assessment & Plan:     Principal Problem:  Lumbar spine epidural abscess, psoas abscess, osteomyelitis of the lower spine. Complicated wound infection  9/9-  Initially the plan was to continue Rocephin and vancomycin till 9/9/2022. As per IDs recommendation, patient will be on antibiotic holiday with IR consult for drainage on 9/12. Pain is adequately controlled with as needed Dilaudid, oxycodone and tramadol.   Ordered for 1 dose of milk of magnesia for constipation. Continue MiraLAX once daily. active Problems:       General weakness    Back pain  9/9/2022  Multifactorial secondary to abscess and osteomyelitis that worsened  Will continue supportive care  Continue pain management  Therapy services on hold at this time we will reinstate when able to          Anemia  9/9/2022  Hemoglobin 8.4        Ischemic cardiomyopathy   ICD (implantable cardioverter-defibrillator) in place    Atrial fibrillation ([facility]  HTN (hypertension)  9/9/2022  BP is optimally controlled with Coreg. Pulmonary emphysema (HCC)  COPD (chronic obstructive pulmonary disease) ([facility]   Obstructive sleep apnea  9/9/2022  Patient is currently on room air. Continue ProAir HFA, Mucinex and Dulera. Nicotine patch TD. Cigarette nicotine dependence in remission  Chewing tobacco dependence  9/9/2022  Patient encouraged to stop chewing tobacco.  Cessation advised. Started on nicotine patch         Diabetic neuropathy (HCC)  Chronic pain of right knee  9/9/2022     Continue gabapentin  Continue pain meds           PAD (peripheral artery disease) ([facility]  9/9/2022  Continue Plavix and Lipitor. Hyperlipidemia  9/9/2022  Continue Lipitor      DM (diabetes mellitus) type II, controlled, with peripheral vascular disorder ([facility] [address]  9/9/2022  Blood glucose is optimally controlled, continue glipizide 5 mg p.o. daily, metformin 500 mg p.o. twice daily, Lantus 40 units subcu nightly along with sliding scale. MINI (acute kidney injury) ([facility]  9/9/2022  Resolved  Avoid nephrotoxins  Dose medications for his renal function     Leg cramping and muscle spasms  9/9/2022  Flexeril. Anticipated discharge needs:    Pending clinical course    I spent 38 minutes of time caring for this patient on the unit nearby or at bedside, and more than 50 percent was spent on coordination of care activities, and/or patient/family counseling regarding status and plan of care.      Diet:  ADULT DIET; Regular; 3 carb choices (45 gm/meal)  ADULT ORAL NUTRITION SUPPLEMENT; Breakfast, Dinner; Other Oral Supplement; Ensure Enlive; strawberry with breakfast, chocolate at supper. DVT PPx: Eliquis  Code status: Full Code    Hospital Problems:  Principal Problem:    Lumbar discitis  Active Problems:    ICD (implantable cardioverter-defibrillator) in place    Physical debility    Psoas muscle abscess (HCC)    Atrial fibrillation (HCC)    HTN (hypertension)    Pulmonary emphysema (HCC)    Severe obesity (BMI 35.0-39. 9) with comorbidity ([facility]    Diabetic neuropathy ([facility]    Sleep apnea    Ventricular tachycardia (HCC)    PAD (peripheral artery disease) ([facility]  Resolved Problems:    * No resolved hospital problems. *      Objective:   Patient Vitals for the past 24 hrs:   Temp Pulse Resp BP SpO2   09/09/22 0711 97.3 °F (36.3 °C) 62 19 (!) 144/72 95 %   09/09/22 0416 98.6 °F (37 °C) 63 20 130/83 93 %   09/09/22 0045 -- -- -- 133/83 --   09/08/22 2349 97.7 °F (36.5 °C) 60 20 -- 93 %   09/08/22 2322 -- -- 18 -- --   09/08/22 1957 -- 60 18 -- 93 %   09/08/22 1945 98.6 °F (37 °C) 61 20 (!) 147/74 91 %   09/08/22 1459 98.6 °F (37 °C) 59 18 (!) 146/77 93 %   09/08/22 1051 98.6 °F (37 °C) 64 17 136/69 91 %         Oxygen Therapy  SpO2: 95 %  Pulse Oximeter Device Mode: Intermittent  Pulse Oximeter Device Location: Right, Finger  O2 Device: None (Room air)  Skin Assessment: Clean, dry, & intact  FiO2 : 28 %  O2 Flow Rate (L/min): 2 L/min  O2 Delivery Method: CPAP/Bi-PAP mask    Estimated body mass index is 31.27 kg/m² as calculated from the following:    Height as of 9/3/22: 6' 1\" (1.854 m). Weight as of 9/3/22: 237 lb (107.5 kg). Intake/Output Summary (Last 24 hours) at 9/9/2022 0828  Last data filed at 9/9/2022 0423  Gross per 24 hour   Intake 240 ml   Output 300 ml   Net -60 ml           Physical Exam:     Blood pressure (!) 144/72, pulse 62, temperature 97.3 °F (36.3 °C), resp. rate 19, SpO2 95 %.   General:    Alert, awake, NAD, on room air  Head:  Normocephalic, atraumatic  Eyes:  Sclerae appear normal.  Pupils equally round. ENT:  Nares appear normal, no drainage. Moist oral mucosa  Neck:  No restricted ROM. Trachea midline   CV:   RRR. No m/r/g. No jugular venous distension. Lungs:   CTAB. No wheezing, rhonchi, or rales. Symmetric expansion. Abdomen: Bowel sounds present. Soft, nontender, nondistended. Extremities: No cyanosis or clubbing. No edema  Skin:     No rashes and normal coloration. Warm and dry.     Neuro:  GCS 15, cranial nerves intact, no motor deficit, mild sensory loss in bilateral lower extremity  Psych:  AOx3, mood and affect appropriate  Point tenderness in lower back appreciated    I have personally reviewed labs and tests showing:  Recent Labs:  Recent Results (from the past 48 hour(s))   Procalcitonin    Collection Time: 09/07/22  9:15 AM   Result Value Ref Range    Procalcitonin <0.05 0.00 - 0.49 ng/mL   Vancomycin Level, Trough    Collection Time: 09/07/22  9:15 AM   Result Value Ref Range    Vancomycin Tr 12.2 5 - 20 ug/mL   C-Reactive Protein    Collection Time: 09/07/22  9:15 AM   Result Value Ref Range    CRP 4.8 (H) 0.0 - 0.9 mg/dL   Sedimentation Rate    Collection Time: 09/07/22 11:01 AM   Result Value Ref Range    Sed Rate, Automated 4 (L) 15 mm/hr   POCT Glucose    Collection Time: 09/07/22 11:17 AM   Result Value Ref Range    POC Glucose 98 65 - 100 mg/dL    Performed by: Hortencia)CNA    POCT Glucose    Collection Time: 09/07/22  4:42 PM   Result Value Ref Range    POC Glucose 105 (H) 65 - 100 mg/dL    Performed by: Hortencia)CNA    POCT Glucose    Collection Time: 09/07/22  8:56 PM   Result Value Ref Range    POC Glucose 102 (H) 65 - 100 mg/dL    Performed by: Jaz    POCT Glucose    Collection Time: 09/08/22  2:07 AM   Result Value Ref Range    POC Glucose 96 65 - 100 mg/dL    Performed by: Arturo    POCT Glucose    Collection Time: 09/08/22 7:42 AM   Result Value Ref Range    POC Glucose 104 (H) 65 - 100 mg/dL    Performed by: Tracie    POCT Glucose    Collection Time: 09/08/22 10:53 AM   Result Value Ref Range    POC Glucose 170 (H) 65 - 100 mg/dL    Performed by: Tracie    POCT Glucose    Collection Time: 09/08/22  4:18 PM   Result Value Ref Range    POC Glucose 80 65 - 100 mg/dL    Performed by: SouthPCGURINDER    POCT Glucose    Collection Time: 09/08/22  8:44 PM   Result Value Ref Range    POC Glucose 102 (H) 65 - 100 mg/dL    Performed by: Mariaelena    POCT Glucose    Collection Time: 09/09/22  1:59 AM   Result Value Ref Range    POC Glucose 113 (H) 65 - 100 mg/dL    Performed by: Arturo    POCT Glucose    Collection Time: 09/09/22  7:12 AM   Result Value Ref Range    POC Glucose 109 (H) 65 - 100 mg/dL    Performed by: Tracie        I have personally reviewed imaging studies showing:   Other Studies:  No orders to display       Current Meds:  Current Facility-Administered Medications   Medication Dose Route Frequency    nicotine (NICODERM CQ) 21 MG/24HR 1 patch  1 patch TransDERmal Daily    albuterol sulfate HFA (PROVENTIL;VENTOLIN;PROAIR) 108 (90 Base) MCG/ACT inhaler 2 puff  2 puff Inhalation BID    mometasone-formoterol (DULERA) 100-5 MCG/ACT inhaler 2 puff  2 puff Inhalation BID    HYDROmorphone HCl PF (DILAUDID) injection 0.25 mg  0.25 mg IntraVENous Q4H PRN    HYDROmorphone HCl PF (DILAUDID) injection 0.5 mg  0.5 mg IntraVENous Q4H PRN    acetaminophen (TYLENOL) tablet 650 mg  650 mg Oral TID    fluticasone (FLONASE) 50 MCG/ACT nasal spray 1 spray  1 spray Each Nostril Daily    furosemide (LASIX) tablet 40 mg  40 mg Oral Daily    gabapentin (NEURONTIN) capsule 300 mg  300 mg Oral TID    glipiZIDE (GLUCOTROL) tablet 5 mg  5 mg Oral QAM AC    guaiFENesin (MUCINEX) extended release tablet 1,200 mg  1,200 mg Oral BID    insulin glargine (LANTUS) injection vial 14 Units  14 Units SubCUTAneous Nightly    insulin lispro (HUMALOG) injection vial 0-16 Units  0-16 Units SubCUTAneous TID WC    insulin lispro (HUMALOG) injection vial 0-4 Units  0-4 Units SubCUTAneous Nightly    latanoprost (XALATAN) 0.005 % ophthalmic solution 1 drop  1 drop Both Eyes Nightly    lidocaine 4 % external patch 1 patch  1 patch TransDERmal Daily    melatonin tablet 6 mg  6 mg Oral Nightly    metFORMIN (GLUCOPHAGE) tablet 500 mg  500 mg Oral BID WC    miconazole (MICOTIN) 2 % powder   Topical BID    montelukast (SINGULAIR) tablet 10 mg  10 mg Oral Nightly    naproxen (NAPROSYN) tablet 500 mg  500 mg Oral BID WC    pantoprazole (PROTONIX) tablet 40 mg  40 mg Oral BID AC    polyethylene glycol (GLYCOLAX) packet 17 g  17 g Oral Daily    sennosides-docusate sodium (SENOKOT-S) 8.6-50 MG tablet 2 tablet  2 tablet Oral Daily    sodium chloride flush 0.9 % injection 10 mL  10 mL IntraVENous BID    amiodarone (CORDARONE) tablet 200 mg  200 mg Oral Daily    traZODone (DESYREL) tablet 25 mg  25 mg Oral Nightly    albuterol sulfate HFA (PROVENTIL;VENTOLIN;PROAIR) 108 (90 Base) MCG/ACT inhaler 2 puff  2 puff Inhalation Q6H PRN    bisacodyl (DULCOLAX) suppository 10 mg  10 mg Rectal Daily PRN    dextrose 10 % infusion   IntraVENous Continuous PRN    dextrose bolus 10% 125 mL  125 mL IntraVENous PRN    Or    dextrose bolus 10% 250 mL  250 mL IntraVENous PRN    glucagon (rDNA) injection 1 mg  1 mg SubCUTAneous PRN    glucose chewable tablet 16 g  4 tablet Oral PRN    ondansetron (ZOFRAN-ODT) disintegrating tablet 4 mg  4 mg Oral Q6H PRN    oxyCODONE (ROXICODONE) immediate release tablet 5 mg  5 mg Oral Q4H PRN    apixaban (ELIQUIS) tablet 5 mg  5 mg Oral BID    sodium chloride flush 0.9 % injection 10 mL  10 mL IntraVENous PRN    traMADol (ULTRAM) tablet 50 mg  50 mg Oral Q6H PRN    atorvastatin (LIPITOR) tablet 20 mg  20 mg Oral Nightly    calcitonin (MIACALCIN) nasal spray 1 spray  1 spray Alternating Nares Daily    carvedilol (COREG) tablet 6.25 mg  6.25 mg Oral BID     clopidogrel (PLAVIX) tablet 75 mg  75 mg Oral Daily    ferrous gluconate (FERGON) tablet 324 mg  324 mg Oral Daily with breakfast       Signed:  Som Membreno MD    Part of this note may have been written by using a voice dictation software. The note has been proof read but may still contain some grammatical/other typographical errors.

## 2022-09-09 NOTE — PROGRESS NOTES
Infectious Disease Consult      Today's Date: 9/9/2022   Admit Date: 9/7/2022    Impression:   L2-4 Discitis, culture negative: IV Vanc/ceftriaxone x 42 days with EOT scheduled 09/09/22. MRI done yesterday secondary to worsening symptoms; revealed new psoas abscess. Unfortunately again no cultures to guide us. Would discontinue antibiotic, wait for 3-5 days and ask IR to aspirate new abscess for culture. Plan:     Plan is to aspirate psoas abscess on Monday  Will follow and start broad antibiotics on Monday. Daptomycin/meropenem? Anti-infectives:   Vancomycin (07/31-09/07)  Ceftriaxone (07/31-09/07)    Subjective:   Afebrile. No change in back pain or debility. Patient is a 65 y.o. male seen by ID in consult from 07/30-08/05 when he presented with back pain and weakness on 07/27 following L2-L4 kyphoplasty on 07/20/22. MRI done revealed osteomyelitis. BC and aspirate were both negative and patient treated empirically with IV Vancomycin and Ceftriaxone for 42 days with EOT 09/09/22. He was transferred to [facility] on 08/08 and continued antibiotics; per patient and wife he was walking with assistance. He was transferred to rehab on 08/26/22. Around that time he developed a sharp hip pain radiating from his left to right hip and gradually was became unable to walk. A repeat MRI done 09/06 revealed worsening finding of osteo at L2-L4 and bilateral psoas abscess with new left psoas abscess at L2-3 level. Lesley Hyde Per wife at bedside, they do not want Dr. Pearl Rankin to operate anymore. Last APRs: Sed rate 36 ; CRP 3.0 on 08/19. Allergies   Allergen Reactions    Azithromycin Hives    Oxaprozin Other (See Comments)     ELEVATED BLOOD PRESSURE        Review of Systems:  Comprehensive ROS negative other than mentioned in HPI    Objective:   Blood pressure (!) 144/72, pulse 62, temperature 97.3 °F (36.3 °C), temperature source Oral, resp. rate 19, SpO2 95 %.   Visit Vitals  BP (!) 144/72   Pulse 62   Temp 97.3 °F (36.3 °C) (Oral)   Resp 19   SpO2 95%     Temp (24hrs), Av.2 °F (36.8 °C), Min:97.3 °F (36.3 °C), Max:98.6 °F (37 °C)       Exam:    General:  Alert, cooperative, morbidly obese, very debilitated and needs help rolling in the bed   Eyes:  Sclera anicteric. Pupils equally round and reactive to light. Mouth/Throat: Mucous membranes normal, oral pharynx clear   Neck: Supple   Lungs:   Clear to auscultation bilaterally, good effort   CV:  Regular rate and rhythm,no murmur, click, rub or gallop   Abdomen:   Soft, non-tender. bowel sounds normal. non-distended   Extremities: No cyanosis or edema   Skin: Skin color, texture, turgor normal. no acute rash or lesions   Lymph nodes:    Musculoskeletal:  No deformity; unable to palpate spine   Lines/Devices:  :  Right midline, nontender, no erythema   Psych: Alert and oriented, normal mood affect given the setting       CBC:  No results for input(s): WBC, MONOS, HGB, HCT, PLT in the last 72 hours. Invalid input(s): GRANS, LYMPHS, EOS, ANEU, ABL,  JAROD      BMP:  No results for input(s): CREA, BUN, NA, K, CL, CO2, AGAP, GLU in the last 72 hours. LFTS:  No results for input(s): ALT, TP, ALB in the last 72 hours. Invalid input(s): TBILI, SGOT, AP      Data Review:   Recent Results (from the past 24 hour(s))   POCT Glucose    Collection Time: 22  4:18 PM   Result Value Ref Range    POC Glucose 80 65 - 100 mg/dL    Performed by: Tracie    POCT Glucose    Collection Time: 22  8:44 PM   Result Value Ref Range    POC Glucose 102 (H) 65 - 100 mg/dL    Performed by: Mariaelena    POCT Glucose    Collection Time: 22  1:59 AM   Result Value Ref Range    POC Glucose 113 (H) 65 - 100 mg/dL    Performed by: Arturo    POCT Glucose    Collection Time: 22  7:12 AM   Result Value Ref Range    POC Glucose 109 (H) 65 - 100 mg/dL    Performed by:  Tracie         Microbiology:    BC x 2  negative  Disc aspirate : negative; gram stain with 20-50 WBC/HPF/ no organisms seen  MRSA nasal swab 03/08/22 negative  Studies:    MRI Lumbar Spine (09/06/22): Impression       1. Worsening imaging findings of osteomyelitis discitis at L2-L4. 2. Slight improvement in persistent ventral epidural enhancing phlegmon versus   small abscess at the level of L2-L3. This superimposed on the preexisting   degenerative changes results in unchanged severe spinal canal narrowing. 3. Bilateral psoas muscle myositis with a new left psoas muscle abscess of the   level of L3-L4 and decrease but persistent left-sided abscess at the level of   L2-L3.            Signed By: Isaura Julio MD     September 9, 2022

## 2022-09-09 NOTE — WOUND CARE
Left posterior heel with 3x1.5cm blister with clear fluid, present this admission to 8th floor, recenty discharged from acute rehab. Heel boots have been in use, must continue to offload heels. Foam dressing to each heel. Will monitor.

## 2022-09-09 NOTE — PROGRESS NOTES
[facility] Resting quietly in bed. Alert and oriented x4. Respirations even and unlabored on room air. NAD noted. Patient denies needs at present. Safety measures in place. Call light within reach.

## 2022-09-09 NOTE — PROGRESS NOTES
Reviewed notes for new spiritual concerns. COURTNEY     DNI     DEBILITY     SEVERE OBESITY            Will follow as needed.

## 2022-09-09 NOTE — PROGRESS NOTES
Physician Progress Note      PATIENT:               Spencer Cho  [facility] #:                  [id number]  :                       1945  ADMIT DATE:       2022 7:22 PM  [address] [place] DATE:  RESPONDING  PROVIDER #:        Bryan Salinas MD        QUERY TEXT:    Type of Anemia: Please provide further specificity, if known. Clinical indicators include: anemia, hemoglobin, 4 units  Options provided:  -- Anemia due to acute blood loss  -- Anemia due to chronic blood loss  -- Anemia due to iron deficiency  -- Anemia due to postoperative blood loss  -- Anemia due to chronic disease  -- Other - I will add my own diagnosis  -- Disagree - Not applicable / Not valid  -- Disagree - Clinically Unable to determine / Unknown        PROVIDER RESPONSE TEXT:    The patient has anemia due to iron deficiency. QUERY TEXT:    Patient admitted with lumbar spine epidural abscess, psoas abscess,   osteomyelitis of the lower spine, noted to have atrial fibrillation and is   maintained on eliquis. If possible, please document in progress notes and   discharge summary if you are evaluating and/or treating any of the following: The medical record reflects the following:  Risk Factors: 65 y.o. male with a past medical history significant for   ischemic cardiomyopathy with ICD/pacemaker, EF of 30 to 35%, A. fib on   Eliquis, diabetes, obesity, peripheral artery disease anemia, COPD with   asthma, coronary artery disease, obstructive sleep apnea on CPAP with recent   L2 L4 kyphoplasty  Clinical Indicators: Atrial fibrillation  Treatment: Eliquis      Thank you,  Jenna Mares RN, BSN, CDI  [email or web address]  .   Options provided:  -- Secondary hypercoagulable state in a patient with atrial fibrillation  -- Other - I will add my own diagnosis  -- Disagree - Not applicable / Not valid  -- Disagree - Clinically unable to determine / Unknown  -- Refer to Clinical Documentation Reviewer    PROVIDER RESPONSE TEXT:    This patient has secondary hypercoagulable state related to atrial   fibrillation.     Query created by: Irasema Friend on 9/9/2022 9:49 AM      Electronically signed by:  Tuyet Rivers MD 9/9/2022 2:37 PM

## 2022-09-09 NOTE — CARE COORDINATION
MSN, CM:  patient to have drain placed on Monday per MD.  Patient has been denied to return to 9th floor. [facility]; referral sent. Case Management will continue to follow.

## 2022-09-09 NOTE — PROGRESS NOTES
Vitals          Oxygen            MOBILITY: I Mod I S SBA CGA Min Mod Max Total  NT x2 Comments:   Bed Mobility    Rolling [] [] [] [] [] [] [] [] [] [] []    Supine to Sit [] [] [] [] [] [] [x] [] [] [] [x]    Scooting [] [] [] [] [] [] [] [] [] [] []    Sit to Supine [] [] [] [] [] [] [x] [] [] [] [x]    Transfers    Sit to Stand [] [] [] [] [] [] [] [] [] [] []    Bed to Chair [] [] [] [] [] [] [] [] [] [] []    Stand to Sit [] [] [] [] [] [] [] [] [] [] []    Tub/Shower [] [] [] [] [] [] [] [] [] [] []     Toilet [] [] [] [] [] [] [] [] [] [] []      [] [] [] [] [] [] [] [] [] [] []    I=Independent, Mod I=Modified Independent, S=Supervision/Setup, SBA=Standby Assistance, CGA=Contact Guard Assistance, Min=Minimal Assistance, Mod=Moderate Assistance, Max=Maximal Assistance, Total=Total Assistance, NT=Not Tested    ACTIVITIES OF DAILY LIVING: I Mod I S SBA CGA Min Mod Max Total NT Comments   BASIC ADLs:              Upper Body   Bathing [] [] [] [] [] [] [] [] [] []    Lower Body Bathing [] [] [] [] [] [] [] [] [] []    Toileting [] [] [] [] [] [x] [] [] [] [] Urinal use    Upper Body Dressing [] [] [] [] [] [] [] [] [] []    Lower Body Dressing [] [] [] [] [] [] [] [] [x] [] Shorts    Feeding [] [] [] [] [] [] [] [] [] []    Grooming [] [] [] [] [] [] [] [] [] []    Personal Device Care [] [] [] [] [] [] [] [] [] []    Functional Mobility [] [] [] [] [] [] [] [] [] []    I=Independent, Mod I=Modified Independent, S=Supervision/Setup, SBA=Standby Assistance, CGA=Contact Guard Assistance, Min=Minimal Assistance, Mod=Moderate Assistance, Max=Maximal Assistance, Total=Total Assistance, NT=Not Tested    BALANCE: Good Fair+ Fair Fair- Poor NT Comments   Sitting Static [] [] [] [x] [] []    Sitting Dynamic [] [] [] [x] [] []              Standing Static [] [] [] [] [] []    Standing Dynamic [] [] [] [] [] []        PLAN:     FREQUENCY/DURATION   OT Plan of Care: 5 times/week for duration of hospital stay or until stated goals are met, whichever comes first.    ACUTE OCCUPATIONAL THERAPY GOALS:   (Developed with and agreed upon by patient and/or caregiver.)    (1.) Mehdi Inoue  will move from supine to sit and sit to supine  with CONTACT GUARD ASSIST within 7 treatment day(s). (2.) Mehdi Inoue will transfer from bed to chair and chair to bed with MINIMAL ASSIST using the least restrictive device within 7 treatment day(s). (3.) Mehdi Inoue will ambulate with MINIMAL ASSIST for 50 feet with the least restrictive device within 7 treatment day(s). (4.) Mehdi Inoue will perform standing static and dynamic balance activities x 10 minutes with CONTACT GUARD ASSIST to improve safety within 7 treatment day(s). (5.) Mehdi Inoue will perform bilateral lower extremity exercises x 20 min for HEP with INDEPENDENCE to improve strength, endurance, and functional mobility within 7 treatment day(s). TREATMENT:     TREATMENT:   AM-  Self Care (23 minutes): Patient participated in lower body dressing ADLs in supine and rolling with maximal verbal and manual cueing to increase independence and decrease assistance required. Patient also participated in energy conservation training to decrease assistance required and increase activity tolerance. PM-  Neuromuscular Re-education (25 Minutes): Neuromuscular Re-education included Balance Training, Coordination training, and Sitting balance training to improve Balance and Coordination.      TREATMENT GRID:  N/A    AFTER TREATMENT PRECAUTIONS: Alarm Activated, Bed, Bed/Chair Locked, Call light within reach, Needs within reach, RN notified, and Side rails x3    INTERDISCIPLINARY COLLABORATION:  RN/ PCT, PT/ PTA, and OT/ KATE    EDUCATION:       TOTAL TREATMENT DURATION AND TIME:  AM-  Time in: 1100  Time out: 1123    PM-  Time In: 1345  Time Out: [address]  Minutes: [address], [place]

## 2022-09-09 NOTE — PROGRESS NOTES
Vascular Access    Right basilic PICC placed 6/4/76, discharged to [facility] 8/8/22, PICC earliest known removed date 8/10/22 per chest xray report. Pt transferred to [address] [place] [address] 8/26/22 and transferred to [facility] on 9/7/22.     18g 10cm midline BARD Powerglide placed on unknown date at [facility]. This midline is pulled out ~1.5cm, line will not flush and has no blood return, VAT was called to assess line as dressing change RN was unable to change the needleless connector. This end cap was unable to be removed. 18g10 cm midline removed, catheter intact, dressing applied. US Guided PIV access    Ultrasound was used to find the vein which was compressible and does not have any features of an artery or nerve bundle. Skin was cleaned and disinfected prior to IV puncture. Under real-time ultrasound guidance peripheral access was obtained in the left cephalic using 20 G 6 CM Extended Dwell Peripheral IV catheter LOT # [id number]  x 1 attempt. Blood return was present and IV flushed without difficulty. IV dressing applied, no immediate complications noted, and patient tolerated the procedure well.     Carla Ron RN, PCCN, VA-BC

## 2022-09-09 NOTE — PROGRESS NOTES
Pt resting in bed at this time. Alert and oriented x 4. Respirations even and unlabored on CPAP. Bed is locked and low. Patient is encouraged to use call light. Preparing to give report to oncoming RN.

## 2022-09-09 NOTE — PROGRESS NOTES
PHYSICAL THERAPY: Daily Note PM   (Link to Caseload Tracking: PT Visit Days : 2  Time In/Out PT Charge Capture  Rehab Caseload Tracker  Orders    Luis Marti is a 65 y.o. male   PRIMARY DIAGNOSIS: Lumbar discitis  Lumbar discitis [M46.46]       Inpatient: Payor: MEDICARE / Plan: MEDICARE PART A AND B / Product Type: *No Product type* /     ASSESSMENT:     REHAB RECOMMENDATIONS:   Recommendation to date pending progress:  Setting:  Short-term Rehab    Equipment:    To Be Determined     ASSESSMENT:  Mr. Matthieu Delacruz is supine and willing to sit up with encouragement. He needed Mod A x2 to sit up and scoot to the edge. His sitting balance was off at first but improved with time and cues. He did a few exercises sitting EOB then lied back down. Some progress made.      SUBJECTIVE:   Mr. Matthieu Delacruz states, \"as long as I don't end up on the floor\"     Social/Functional Lives With: Spouse  Type of Home: House  Home Layout: Two level  Home Access: Stairs to enter with rails  Entrance Stairs - Number of Steps: 4  Entrance Stairs - Rails: Left  Bathroom Shower/Tub: Walk-in shower  Bathroom Toilet: Handicap height  Bathroom Equipment: Grab bars in shower, Toilet raiser  Bathroom Accessibility: Accessible  Home Equipment: Desiree Avila Walker,  [address] Help From: Family  ADL Assistance: Independent  Homemaking Assistance: Independent  Homemaking Responsibilities: No  Ambulation Assistance: Independent  Transfer Assistance: Independent  Active : Yes  Mode of Transportation: Car  Occupation: Retired  OBJECTIVE:     PAIN: VITALS / O2: PRECAUTION / Ileene Ruddy / Chandu Soares:   Pre Treatment:          Post Treatment: 4 Vitals        Oxygen    None    RESTRICTIONS/PRECAUTIONS:        MOBILITY: I Mod I S SBA CGA Min Mod Max Total  NT x2 Comments:   Bed Mobility    Rolling [] [] [] [] [] [x] [] [] [] [] []    Supine to Sit [] [] [] [] [] [x] [x] [] [] [] [x]    Scooting [] [] [] [] [] [x] [] [] [] [] []    Sit to Supine [] [] [] [] [] [x] [x] [] [] [] [x]    Transfers    Sit to Stand [] [] [] [] [] [] [] [] [] [] []    Bed to Chair [] [] [] [] [] [] [] [] [] [] []    Stand to Sit [] [] [] [] [] [] [] [] [] [] []     [] [] [] [] [] [] [] [] [] [] []    I=Independent, Mod I=Modified Independent, S=Supervision, SBA=Standby Assistance, CGA=Contact Guard Assistance,   Min=Minimal Assistance, Mod=Moderate Assistance, Max=Maximal Assistance, Total=Total Assistance, NT=Not Tested    BALANCE: Good Fair+ Fair Fair- Poor NT Comments   Sitting Static [] [x] [x] [] [] []    Sitting Dynamic [] [x] [x] [] [] []              Standing Static [] [] [] [] [] []    Standing Dynamic [] [] [] [] [] []      GAIT: I Mod I S SBA CGA Min Mod Max Total  NT x2 Comments:   Level of Assistance [] [] [] [] [] [] [] [] [] [] []    Distance   feet    DME N/A    Gait Quality N/A    Weightbearing Status      Stairs      I=Independent, Mod I=Modified Independent, S=Supervision, SBA=Standby Assistance, CGA=Contact Guard Assistance,   Min=Minimal Assistance, Mod=Moderate Assistance, Max=Maximal Assistance, Total=Total Assistance, NT=Not Tested    PLAN:   ACUTE PHYSICAL THERAPY GOALS:   (Developed with and agreed upon by patient and/or caregiver.)     (1.) Angela Foil  will move from supine to sit and sit to supine  with CONTACT GUARD ASSIST within 7 treatment day(s). (2.) Angela Foil will transfer from bed to chair and chair to bed with MINIMAL ASSIST using the least restrictive device within 7 treatment day(s). (3.) Angela Foil will ambulate with MINIMAL ASSIST for 50 feet with the least restrictive device within 7 treatment day(s). (4.) Angela Foil will perform standing static and dynamic balance activities x 10 minutes with CONTACT GUARD ASSIST to improve safety within 7 treatment day(s).   (5.) Angela Foil will perform bilateral lower extremity exercises x 20 min for HEP with INDEPENDENCE to improve strength, endurance, and functional mobility within 7 treatment day(s). FREQUENCY AND DURATION: Daily for duration of hospital stay or until stated goals are met, whichever comes first.    TREATMENT:   TREATMENT:   Co-Treatment PT/OT necessary due to patient's decreased overall endurance/tolerance levels, as well as need for high level skilled assistance to complete functional transfers/mobility and functional tasks  Therapeutic Activity (15 Minutes): Therapeutic activity included Rolling, Supine to Sit, Sit to Supine, Scooting, and Sitting balance  to improve functional Activity tolerance, Balance, Mobility, and Strength. Therapeutic Exercise (10 Minutes): Therapeutic exercises noted below to improve functional activity tolerance, strength, and mobility.      TREATMENT GRID:   Date:  9/9/22 Date:   Date:     Activity/Exercise Parameters Parameters Parameters   Seated TKE 5x2 B     Seated marching 10x B     Seated arm raises 5x B     Seated punching 10x B                           AFTER TREATMENT PRECAUTIONS: Bed, Bed/Chair Locked, Call light within reach, Needs within reach, and RN notified    INTERDISCIPLINARY COLLABORATION:  RN/ PCT, PT/ PTA, and OT/ KATE    EDUCATION:      TIME IN/OUT:  Time In: 1345  [facility]  Minutes: 25    John Leyva PTA

## 2022-09-09 NOTE — PROGRESS NOTES
Patient is on home cpap with 2L bleed in.        09/08/22 [id number]   NIV Type   Skin Assessment Clean, dry, & intact   NIV Started/Stopped On   Equipment Type Al Respironics cpap   Mode Auto-PAP   Mask Type Nasal pillows   Mask Size Large   Settings/Measurements   CPAP/EPAP 12 cmH2O   Resp 18   O2 Flow Rate (L/min) 2 L/min   FiO2  28 %   Comfort Level Good   Using Accessory Muscles No   SpO2 93   Patient's Home Machine Yes (type/vendor)   Electrical Safety Check Performed Yes

## 2022-09-10 LAB
GLUCOSE BLD STRIP.AUTO-MCNC: 118 MG/DL (ref 65–100)
GLUCOSE BLD STRIP.AUTO-MCNC: 127 MG/DL (ref 65–100)
GLUCOSE BLD STRIP.AUTO-MCNC: 131 MG/DL (ref 65–100)
GLUCOSE BLD STRIP.AUTO-MCNC: 183 MG/DL (ref 65–100)
SERVICE CMNT-IMP: ABNORMAL

## 2022-09-10 PROCEDURE — 94760 N-INVAS EAR/PLS OXIMETRY 1: CPT

## 2022-09-10 PROCEDURE — 1100000003 HC PRIVATE W/ TELEMETRY

## 2022-09-10 PROCEDURE — 82962 GLUCOSE BLOOD TEST: CPT

## 2022-09-10 PROCEDURE — 6370000000 HC RX 637 (ALT 250 FOR IP): Performed by: HOSPITALIST

## 2022-09-10 PROCEDURE — 94640 AIRWAY INHALATION TREATMENT: CPT

## 2022-09-10 PROCEDURE — 1100000000 HC RM PRIVATE

## 2022-09-10 PROCEDURE — 2580000003 HC RX 258: Performed by: INTERNAL MEDICINE

## 2022-09-10 PROCEDURE — 6370000000 HC RX 637 (ALT 250 FOR IP): Performed by: INTERNAL MEDICINE

## 2022-09-10 PROCEDURE — 6360000002 HC RX W HCPCS: Performed by: HOSPITALIST

## 2022-09-10 RX ADMIN — TRAMADOL HYDROCHLORIDE 50 MG: 50 TABLET ORAL at 12:18

## 2022-09-10 RX ADMIN — FUROSEMIDE 40 MG: 40 TABLET ORAL at 08:29

## 2022-09-10 RX ADMIN — GABAPENTIN 300 MG: 300 CAPSULE ORAL at 08:30

## 2022-09-10 RX ADMIN — NAPROXEN 500 MG: 250 TABLET ORAL at 08:29

## 2022-09-10 RX ADMIN — ACETAMINOPHEN 650 MG: 325 TABLET ORAL at 21:05

## 2022-09-10 RX ADMIN — MONTELUKAST 10 MG: 10 TABLET, FILM COATED ORAL at 21:06

## 2022-09-10 RX ADMIN — SENNOSIDES AND DOCUSATE SODIUM 2 TABLET: 8.6; 5 TABLET ORAL at 08:30

## 2022-09-10 RX ADMIN — ALBUTEROL SULFATE 2 PUFF: 90 AEROSOL, METERED RESPIRATORY (INHALATION) at 19:40

## 2022-09-10 RX ADMIN — NAPROXEN 500 MG: 250 TABLET ORAL at 16:33

## 2022-09-10 RX ADMIN — FERROUS GLUCONATE 324 MG: 324 TABLET ORAL at 08:30

## 2022-09-10 RX ADMIN — METFORMIN HYDROCHLORIDE 500 MG: 500 TABLET ORAL at 16:34

## 2022-09-10 RX ADMIN — ANTI-FUNGAL POWDER MICONAZOLE NITRATE TALC FREE: 1.42 POWDER TOPICAL at 08:31

## 2022-09-10 RX ADMIN — MAGNESIUM HYDROXIDE 30 ML: 2400 SUSPENSION ORAL at 10:56

## 2022-09-10 RX ADMIN — APIXABAN 5 MG: 5 TABLET, FILM COATED ORAL at 08:30

## 2022-09-10 RX ADMIN — HYDROMORPHONE HYDROCHLORIDE 0.5 MG: 1 INJECTION, SOLUTION INTRAMUSCULAR; INTRAVENOUS; SUBCUTANEOUS at 15:40

## 2022-09-10 RX ADMIN — TRAZODONE HYDROCHLORIDE 25 MG: 50 TABLET ORAL at 21:06

## 2022-09-10 RX ADMIN — APIXABAN 5 MG: 5 TABLET, FILM COATED ORAL at 21:06

## 2022-09-10 RX ADMIN — PANTOPRAZOLE SODIUM 40 MG: 40 TABLET, DELAYED RELEASE ORAL at 05:18

## 2022-09-10 RX ADMIN — HYDROMORPHONE HYDROCHLORIDE 0.5 MG: 1 INJECTION, SOLUTION INTRAMUSCULAR; INTRAVENOUS; SUBCUTANEOUS at 05:18

## 2022-09-10 RX ADMIN — CARVEDILOL 6.25 MG: 3.12 TABLET, FILM COATED ORAL at 16:33

## 2022-09-10 RX ADMIN — CARVEDILOL 6.25 MG: 3.12 TABLET, FILM COATED ORAL at 08:29

## 2022-09-10 RX ADMIN — CALCITONIN SALMON 1 SPRAY: 200 SPRAY, METERED NASAL at 08:29

## 2022-09-10 RX ADMIN — OXYCODONE 5 MG: 5 TABLET ORAL at 10:56

## 2022-09-10 RX ADMIN — ACETAMINOPHEN 650 MG: 325 TABLET ORAL at 08:30

## 2022-09-10 RX ADMIN — GUAIFENESIN 1200 MG: 600 TABLET ORAL at 08:29

## 2022-09-10 RX ADMIN — FLUTICASONE PROPIONATE 1 SPRAY: 50 SPRAY, METERED NASAL at 08:31

## 2022-09-10 RX ADMIN — SODIUM CHLORIDE, PRESERVATIVE FREE 10 ML: 5 INJECTION INTRAVENOUS at 21:07

## 2022-09-10 RX ADMIN — OXYCODONE 5 MG: 5 TABLET ORAL at 16:33

## 2022-09-10 RX ADMIN — GABAPENTIN 300 MG: 300 CAPSULE ORAL at 21:05

## 2022-09-10 RX ADMIN — ALBUTEROL SULFATE 2 PUFF: 90 AEROSOL, METERED RESPIRATORY (INHALATION) at 08:30

## 2022-09-10 RX ADMIN — ANTI-FUNGAL POWDER MICONAZOLE NITRATE TALC FREE: 1.42 POWDER TOPICAL at 21:13

## 2022-09-10 RX ADMIN — ACETAMINOPHEN 650 MG: 325 TABLET ORAL at 13:32

## 2022-09-10 RX ADMIN — ATORVASTATIN CALCIUM 20 MG: 20 TABLET, FILM COATED ORAL at 21:06

## 2022-09-10 RX ADMIN — GLIPIZIDE 5 MG: 5 TABLET ORAL at 08:30

## 2022-09-10 RX ADMIN — GUAIFENESIN 1200 MG: 600 TABLET ORAL at 21:05

## 2022-09-10 RX ADMIN — AMIODARONE HYDROCHLORIDE 200 MG: 200 TABLET ORAL at 08:30

## 2022-09-10 RX ADMIN — INSULIN GLARGINE 14 UNITS: 100 INJECTION, SOLUTION SUBCUTANEOUS at 21:08

## 2022-09-10 RX ADMIN — Medication 6 MG: at 21:05

## 2022-09-10 RX ADMIN — CLOPIDOGREL BISULFATE 75 MG: 75 TABLET ORAL at 08:30

## 2022-09-10 RX ADMIN — PANTOPRAZOLE SODIUM 40 MG: 40 TABLET, DELAYED RELEASE ORAL at 16:33

## 2022-09-10 RX ADMIN — METFORMIN HYDROCHLORIDE 500 MG: 500 TABLET ORAL at 08:30

## 2022-09-10 RX ADMIN — LATANOPROST 1 DROP: 50 SOLUTION OPHTHALMIC at 21:12

## 2022-09-10 RX ADMIN — GABAPENTIN 300 MG: 300 CAPSULE ORAL at 13:32

## 2022-09-10 RX ADMIN — SODIUM CHLORIDE, PRESERVATIVE FREE 10 ML: 5 INJECTION INTRAVENOUS at 08:31

## 2022-09-10 RX ADMIN — HYDROMORPHONE HYDROCHLORIDE 0.5 MG: 1 INJECTION, SOLUTION INTRAMUSCULAR; INTRAVENOUS; SUBCUTANEOUS at 08:29

## 2022-09-10 ASSESSMENT — PAIN SCALES - GENERAL
PAINLEVEL_OUTOF10: 0
PAINLEVEL_OUTOF10: 9
PAINLEVEL_OUTOF10: 7
PAINLEVEL_OUTOF10: 7
PAINLEVEL_OUTOF10: 0
PAINLEVEL_OUTOF10: 10
PAINLEVEL_OUTOF10: 7
PAINLEVEL_OUTOF10: 8

## 2022-09-10 ASSESSMENT — PAIN DESCRIPTION - LOCATION
LOCATION: PELVIS
LOCATION: HIP;PELVIS
LOCATION: BACK;HIP

## 2022-09-10 ASSESSMENT — PAIN DESCRIPTION - DESCRIPTORS
DESCRIPTORS: SHARP;SHOOTING;SPASM
DESCRIPTORS: SHOOTING;SPASM;SHARP
DESCRIPTORS: ACHING;SPASM
DESCRIPTORS: SHARP;SHOOTING;SPASM
DESCRIPTORS: SPASM;STABBING;SHOOTING
DESCRIPTORS: SPASM;STABBING;SHARP

## 2022-09-10 ASSESSMENT — PAIN DESCRIPTION - ORIENTATION: ORIENTATION: RIGHT;LEFT;LOWER

## 2022-09-10 NOTE — PROGRESS NOTES
Comprehensive Nutrition Assessment    Type and Reason for Visit: Initial, Wound  Best Practice Alert for Pressure Injury Stage 2 or greater    Nutrition Recommendations/Plan:   Meals and Snacks:  Diet: Continue current order  Nutrition Supplement Therapy:  Medical food supplement therapy:  Advance Ensure Enlive three times per day (this provides 350 kcal and 20 grams protein per bottle)     Malnutrition Assessment:  Malnutrition Status: At risk for malnutrition (Comment) (progressive poor PO with wt loss)       Nutrition Assessment:  Nutrition History: Patient known to RD from previous admission. He and spouse reported at that time progressive decline in PO since COVID infection last year. Then intake decline to bites after recent surgery. During admission patient was again eating very poorly even despite family bringing foods that he likes. He was drinking Ensure well. Wife states that since d/c to [facility] and then [facility] intake has somewhat improved. Bed weight last admission 259#. Weight at [facility] 9/3 237#. No joe wasting. Do You Have Any Cultural, Druze, or Ethnic Food Preferences?: No   Nutrition Background:   Patient with PMH significant for ICM/PPM, Afib, DM2, obesity, PAD, anemia, COPD, CAD, SHERI on CPAP, recentl L2-4 kyphoplasty. Recent admission with worsening back pain, lower extremity weakness, and falls. He d/c to [facility] 8/8 then to [facility] 8/26. He decline in participation with rehab and had AMS and was admitted back to [facility]. He was found to have worsening osteomyelitis and psoas muscle abscess. Wound Type:  (3x1.5 cm left heel ulcer per WC note 9/9)    Nutrition Interval:  Patient seen reclined in bed with several family members present. Wife feeding patient biscuits and gravy that she brought in. Also noted portion of eggs from outside eaten. Patient states that he did not eat the inpatient breakfast. Wife states that he ate ~50% of a burger yesterday. He states that he always drinks his Ensure. Wife states that he does not like chicken or turkey and overall patient has not been eating much meat. She has been trying to bring in protein foods such as breakfast this am and boiled eggs. Current Nutrition Therapies:  ADULT DIET; Regular; 3 carb choices (45 gm/meal)  ADULT ORAL NUTRITION SUPPLEMENT; Breakfast, Dinner; Other Oral Supplement; Ensure Enlive; strawberry with breakfast, chocolate at supper. Current Intake:   Average Meal Intake: 26-50% Average Supplements Intake: %      Anthropometric Measures:  Height: 6' 1\" (185.4 cm)  Current Body Wt: 242 lb 4.6 oz (109.9 kg) (9/10), Weight source: Bed Scale  BMI: 32, Obese Class 1 (BMI 30.0-34. 9)  Admission Body Weight: 242 lb 4.6 oz (109.9 kg)  Ideal Body Weight (Kg) (Calculated): 84 kg (184 lbs), 131.7 %  Usual Body Wt: 280 lb (127 kg) (1/6/22), Percent weight change: -13.5       Edema:Peripheral Vascular  Peripheral Vascular (WDL): Exceptions to WDL  Edema: Generalized  Edema Generalized: Trace   BMI Category Obese Class 1 (BMI 30.0-34. 9)  Estimated Daily Nutrient Needs:  Energy (kcal/day): 7727-2136 (Kcal/kg (15-20) Weight used: 109.9 kg Current  Protein (g/day):  (20% kcal) Weight Used: (Other (Comment))    Fluid (ml/day):   (1 ml/kcal)    Nutrition Diagnosis:   Inadequate oral intake related to  (poor appetite) as evidenced by  (reported barrier to PO, intake as above)  Nutrition Interventions:   Food and/or Nutrient Delivery: Continue Current Diet, Modify Oral Nutrition Supplement     Coordination of Nutrition Care: Continue to monitor while inpatient       Goals:       Active Goal: PO intake 50% or greater       Nutrition Monitoring and Evaluation:      Food/Nutrient Intake Outcomes: Food and Nutrient Intake, Supplement Intake  Physical Signs/Symptoms Outcomes: Meal Time Behavior, Skin, Weight    Discharge Planning:    Continue Oral Nutrition Supplement    MAGUE BLAIR, RD

## 2022-09-10 NOTE — PROGRESS NOTES
Hospitalist Progress Note   Admit Date:  2022  7:22 PM   Name:  Nixon Barahona   Age:  65 y.o. Sex:  male  :  1945   MRN:  [id number]   Room:  807/    Presenting Complaint: No chief complaint on file. Reason(s) for Admission: Lumbar discitis [facility] Course: Nixon Barahona is a 65 y.o. male with a past medical history significant for ischemic cardiomyopathy with ICD/pacemaker, EF of 30 to 35%, A. fib on Eliquis, diabetes, obesity, peripheral artery disease anemia, COPD with asthma, coronary artery disease, obstructive sleep apnea on CPAP with recent L2 L4 kyphoplasty who was recently charged from [facility] 2022 to [facility] after being admitted for UTI worsening weakness status post surgery with several falls. MRI of the lumbar spine and cervical spine were done and he was found to have a lumbar spine epidural abscess and symptoms abscess. His CRP was elevated at 12.4. He had noted osteomyelitis of the lower spine. His oral anticoagulation was held and he was placed on a heparin drip. He was also placed on vancomycin and ceftriaxone after IR drain abscess. He was followed by infectious disease and treated for 6 weeks with Vanco and Rocephin. He developed altered mental status. This was thought to be due to metabolic cause. Unfortunately he started having increasing complaints of back pain confusion. He was transferred to [facility] for ongoing therapy and antibiotic needs. Cardiology was consulted and his ICD was interrogated and found to be functioning well. A Tomas was placed due to his urinary retention. Urology was consulted. He had a complete altered mental status work-up and it was thought he had acute encephalopathy secondary to especially opiates and antipsychotics. He has been weaning off Zyprexa with plans to continue IV antibiotics for total of 6 weeks with estimated end of therapy 2022.   He was transferred to inpatient rehab floor on August 26, 2022. He was continued on antibiotics he continued to have lower back pain and has been poorly participative in therapy over the last few days. MRI of the spine done today showed an apparent worsening osteomyelitis and psoas muscle abscess. It also showed compression of the cauda equina nerve roots at multiple levels in the lumbar spine as before. Multilevel degenerative disc disease and facet arthropathy. Bilateral psoas muscle myositis with new left psoas muscle abscess at the level of L3-L4. Because of the abscesses ID was sultan. And recommended stopping all antibiotics at this time and getting IR to aspirate the abscess for culture likely on Monday of next week. Dr. Mirtha Morales was also consulted and felt no surgical role indicated at this time and that his degenerative stenosis can be addressed once he is infection free. As the patient is no longer able to participate in therapy it was thought best to transfer him back to the hospital.         Subjective & 24hr Events (09/10/22): Seen at bedside, resting in bed comfortably. He denies any new complaints but  Continues to have chronic lower back pain, not worsening. Denies any numbness tingling or weakness in bilateral lower extremities. No fever, chills, chest pain, nausea vomiting or diarrhea. ROS:  10 point review of system is negative except for what mentioned above. Assessment & Plan:     Principal Problem:  Lumbar spine epidural abscess, psoas abscess, osteomyelitis of the lower spine. Complicated wound infection  9/10-  Initially the plan was to continue Rocephin and vancomycin till 9/9/2022. As per IDs recommendation, patient will be on antibiotic holiday with IR consult for drainage on 9/12. Pain is adequately controlled with as needed Dilaudid, oxycodone and tramadol. Ordered for 1 dose of milk of magnesia for constipation. Continue MiraLAX once daily.      active Problems:       General weakness    Back pain  9/10/2022  Multifactorial secondary to abscess and osteomyelitis that worsened  Will continue supportive care  Continue pain management            Anemia  9/10/2022  Hemoglobin 8.4        Ischemic cardiomyopathy   ICD (implantable cardioverter-defibrillator) in place    Atrial fibrillation (HCC)  HTN (hypertension)  9/10/2022  BP is optimally controlled with Coreg. Pulmonary emphysema (HCC)  COPD (chronic obstructive pulmonary disease) ([facility] [address]   Obstructive sleep apnea  9/10/2022  Patient is currently on room air. Continue ProAir HFA, Mucinex and Dulera. Nicotine patch TD. Cigarette nicotine dependence in remission  Chewing tobacco dependence  9/10/2022  Patient encouraged to stop chewing tobacco.  Cessation advised. Started on nicotine patch         Diabetic neuropathy (HCC)  Chronic pain of right knee  9/10/2022     Continue gabapentin  Continue pain meds           PAD (peripheral artery disease) ([facility] [address]  9/10/2022  Continue Plavix and Lipitor. Hyperlipidemia  9/10/2022  Continue Lipitor      DM (diabetes mellitus) type II, controlled, with peripheral vascular disorder ([facility] [address]  9/10/2022  Blood glucose is optimally controlled, continue glipizide 5 mg p.o. daily, metformin 500 mg p.o. twice daily, Lantus 40 units subcu nightly along with sliding scale. MINI (acute kidney injury) ([facility] [address]  9/10/2022  Resolved  Avoid nephrotoxins  Dose medications for his renal function     Leg cramping and muscle spasms  9/10/2022  Flexeril. Anticipated discharge needs:    Pending clinical course    I spent 35 minutes of time caring for this patient on the unit nearby or at bedside, and more than 50 percent was spent on coordination of care activities, and/or patient/family counseling regarding status and plan of care. Diet:  ADULT DIET; Regular; 3 carb choices (45 gm/meal)  ADULT ORAL NUTRITION SUPPLEMENT; Breakfast, Dinner;  Other Oral Supplement; Ensure Enlive; strawberry with breakfast, chocolate at supper. DVT PPx: Eliquis  Code status: Full Code    Hospital Problems:  Principal Problem:    Lumbar discitis  Active Problems:    ICD (implantable cardioverter-defibrillator) in place    Physical debility    Psoas muscle abscess (HCC)    Atrial fibrillation (HCC)    HTN (hypertension)    Pulmonary emphysema (HCC)    Severe obesity (BMI 35.0-39. 9) with comorbidity ([facility] [address]    Diabetic neuropathy ([facility] [address]    Sleep apnea    Ventricular tachycardia (HCC)    PAD (peripheral artery disease) ([facility]  Resolved Problems:    * No resolved hospital problems. *      Objective:   Patient Vitals for the past 24 hrs:   Temp Pulse Resp BP SpO2   09/10/22 0518 -- -- 18 -- --   09/10/22 0339 98.6 °F (37 °C) 62 22 138/83 92 %   09/09/22 2302 98.2 °F (36.8 °C) 62 20 137/67 93 %   09/09/22 2131 -- -- 18 -- --   09/09/22 1950 98 °F (36.7 °C) 61 20 (!) 153/74 91 %   09/09/22 1523 97.7 °F (36.5 °C) 62 19 136/63 (!) 89 %   09/09/22 1139 97.9 °F (36.6 °C) 62 17 117/71 92 %         Oxygen Therapy  SpO2: 92 %  Pulse Oximeter Device Mode: Intermittent  Pulse Oximeter Device Location: Right, Finger  O2 Device: PAP (positive airway pressure)  Skin Assessment: Clean, dry, & intact  FiO2 : 28 %  O2 Flow Rate (L/min): 2 L/min  O2 Delivery Method: CPAP/Bi-PAP mask    Estimated body mass index is 31.27 kg/m² as calculated from the following:    Height as of 9/3/22: 6' 1\" (1.854 m). Weight as of 9/3/22: 237 lb (107.5 kg). Intake/Output Summary (Last 24 hours) at 9/10/2022 0742  Last data filed at 9/9/2022 1850  Gross per 24 hour   Intake --   Output 650 ml   Net -650 ml           Physical Exam:     Blood pressure 138/83, pulse 62, temperature 98.6 °F (37 °C), temperature source Oral, resp. rate 18, SpO2 92 %. General:    Alert, awake, NAD, on room air  HEENT:          Head NCAT, PERRLA positive, MMM  CV:   RRR. No m/r/g. No jugular venous distension. Lungs:   CTAB.   No wheezing, rhonchi, or MG/5ML suspension 30 mL  30 mL Oral Once    nicotine (NICODERM CQ) 21 MG/24HR 1 patch  1 patch TransDERmal Daily    albuterol sulfate HFA (PROVENTIL;VENTOLIN;PROAIR) 108 (90 Base) MCG/ACT inhaler 2 puff  2 puff Inhalation BID    mometasone-formoterol (DULERA) 100-5 MCG/ACT inhaler 2 puff  2 puff Inhalation BID    HYDROmorphone HCl PF (DILAUDID) injection 0.25 mg  0.25 mg IntraVENous Q4H PRN    HYDROmorphone HCl PF (DILAUDID) injection 0.5 mg  0.5 mg IntraVENous Q4H PRN    acetaminophen (TYLENOL) tablet 650 mg  650 mg Oral TID    fluticasone (FLONASE) 50 MCG/ACT nasal spray 1 spray  1 spray Each Nostril Daily    furosemide (LASIX) tablet 40 mg  40 mg Oral Daily    gabapentin (NEURONTIN) capsule 300 mg  300 mg Oral TID    glipiZIDE (GLUCOTROL) tablet 5 mg  5 mg Oral QAM AC    guaiFENesin (MUCINEX) extended release tablet 1,200 mg  1,200 mg Oral BID    insulin glargine (LANTUS) injection vial 14 Units  14 Units SubCUTAneous Nightly    insulin lispro (HUMALOG) injection vial 0-16 Units  0-16 Units SubCUTAneous TID WC    insulin lispro (HUMALOG) injection vial 0-4 Units  0-4 Units SubCUTAneous Nightly    latanoprost (XALATAN) 0.005 % ophthalmic solution 1 drop  1 drop Both Eyes Nightly    lidocaine 4 % external patch 1 patch  1 patch TransDERmal Daily    melatonin tablet 6 mg  6 mg Oral Nightly    metFORMIN (GLUCOPHAGE) tablet 500 mg  500 mg Oral BID WC    miconazole (MICOTIN) 2 % powder   Topical BID    montelukast (SINGULAIR) tablet 10 mg  10 mg Oral Nightly    naproxen (NAPROSYN) tablet 500 mg  500 mg Oral BID WC    pantoprazole (PROTONIX) tablet 40 mg  40 mg Oral BID AC    polyethylene glycol (GLYCOLAX) packet 17 g  17 g Oral Daily    sennosides-docusate sodium (SENOKOT-S) 8.6-50 MG tablet 2 tablet  2 tablet Oral Daily    sodium chloride flush 0.9 % injection 10 mL  10 mL IntraVENous BID    amiodarone (CORDARONE) tablet 200 mg  200 mg Oral Daily    traZODone (DESYREL) tablet 25 mg  25 mg Oral Nightly    albuterol sulfate HFA (PROVENTIL;VENTOLIN;PROAIR) 108 (90 Base) MCG/ACT inhaler 2 puff  2 puff Inhalation Q6H PRN    bisacodyl (DULCOLAX) suppository 10 mg  10 mg Rectal Daily PRN    dextrose 10 % infusion   IntraVENous Continuous PRN    dextrose bolus 10% 125 mL  125 mL IntraVENous PRN    Or    dextrose bolus 10% 250 mL  250 mL IntraVENous PRN    glucagon (rDNA) injection 1 mg  1 mg SubCUTAneous PRN    glucose chewable tablet 16 g  4 tablet Oral PRN    ondansetron (ZOFRAN-ODT) disintegrating tablet 4 mg  4 mg Oral Q6H PRN    oxyCODONE (ROXICODONE) immediate release tablet 5 mg  5 mg Oral Q4H PRN    apixaban (ELIQUIS) tablet 5 mg  5 mg Oral BID    sodium chloride flush 0.9 % injection 10 mL  10 mL IntraVENous PRN    traMADol (ULTRAM) tablet 50 mg  50 mg Oral Q6H PRN    atorvastatin (LIPITOR) tablet 20 mg  20 mg Oral Nightly    calcitonin (MIACALCIN) nasal spray 1 spray  1 spray Alternating Nares Daily    carvedilol (COREG) tablet 6.25 mg  6.25 mg Oral BID WC    clopidogrel (PLAVIX) tablet 75 mg  75 mg Oral Daily    ferrous gluconate (FERGON) tablet 324 mg  324 mg Oral Daily with breakfast       Signed:  Magi Rae MD    Part of this note may have been written by using a voice dictation software. The note has been proof read but may still contain some grammatical/other typographical errors.

## 2022-09-10 NOTE — FLOWSHEET NOTE
09/10/22 0545   Pain Assessment   Pain Assessment None - Denies Pain   Pain Level 0   Response to Pain Intervention Patient satisfied   Side Effects No reported side effects

## 2022-09-10 NOTE — FLOWSHEET NOTE
09/09/22 2131   Vital Signs   Resp 18   Pain Assessment   Pain Assessment 0-10   Pain Level 7   Patient's Stated Pain Goal 0 - No pain   Pain Location Back   Pain Descriptors Sharp   Opioid-Induced Sedation   POSS Score 1     Dilaudid 0.5mg IV given

## 2022-09-10 NOTE — FLOWSHEET NOTE
09/10/22 0518   Vital Signs   Resp 18   Pain Assessment   Pain Assessment 0-10   Pain Level 7   Patient's Stated Pain Goal 0 - No pain   Pain Location Back; Hip   Pain Orientation Right;Left;Lower   Pain Descriptors Aching;Spasm   Opioid-Induced Sedation   POSS Score 1     Dilaudid 0.5mg IV given

## 2022-09-10 NOTE — FLOWSHEET NOTE
09/09/22 2062   Pain Assessment   Pain Assessment None - Denies Pain   Pain Level 0   Response to Pain Intervention Patient satisfied   Side Effects No reported side effects

## 2022-09-10 NOTE — PROGRESS NOTES
Mr. Azael Galeas refused opportunity to work with therapy today despite strong encouragement due to anticipating more pain when he moves. Will check on him again tomorrow.     Jenna Daley PTA

## 2022-09-10 NOTE — PROGRESS NOTES
Patient resting quietly in bed. Respirations even and unlabored. NAD noted. No events through the night.

## 2022-09-11 LAB
GLUCOSE BLD STRIP.AUTO-MCNC: 118 MG/DL (ref 65–100)
GLUCOSE BLD STRIP.AUTO-MCNC: 122 MG/DL (ref 65–100)
GLUCOSE BLD STRIP.AUTO-MCNC: 162 MG/DL (ref 65–100)
GLUCOSE BLD STRIP.AUTO-MCNC: 186 MG/DL (ref 65–100)
SERVICE CMNT-IMP: ABNORMAL

## 2022-09-11 PROCEDURE — 94760 N-INVAS EAR/PLS OXIMETRY 1: CPT

## 2022-09-11 PROCEDURE — 2580000003 HC RX 258: Performed by: INTERNAL MEDICINE

## 2022-09-11 PROCEDURE — 94640 AIRWAY INHALATION TREATMENT: CPT

## 2022-09-11 PROCEDURE — 82962 GLUCOSE BLOOD TEST: CPT

## 2022-09-11 PROCEDURE — 1100000000 HC RM PRIVATE

## 2022-09-11 PROCEDURE — 6370000000 HC RX 637 (ALT 250 FOR IP): Performed by: INTERNAL MEDICINE

## 2022-09-11 PROCEDURE — 97110 THERAPEUTIC EXERCISES: CPT

## 2022-09-11 PROCEDURE — 97530 THERAPEUTIC ACTIVITIES: CPT

## 2022-09-11 RX ADMIN — TRAZODONE HYDROCHLORIDE 25 MG: 50 TABLET ORAL at 21:25

## 2022-09-11 RX ADMIN — ALBUTEROL SULFATE 2 PUFF: 90 AEROSOL, METERED RESPIRATORY (INHALATION) at 07:50

## 2022-09-11 RX ADMIN — Medication 6 MG: at 21:24

## 2022-09-11 RX ADMIN — CARVEDILOL 6.25 MG: 3.12 TABLET, FILM COATED ORAL at 09:44

## 2022-09-11 RX ADMIN — CARVEDILOL 6.25 MG: 3.12 TABLET, FILM COATED ORAL at 16:19

## 2022-09-11 RX ADMIN — ACETAMINOPHEN 650 MG: 325 TABLET ORAL at 13:26

## 2022-09-11 RX ADMIN — FERROUS GLUCONATE 324 MG: 324 TABLET ORAL at 09:44

## 2022-09-11 RX ADMIN — PANTOPRAZOLE SODIUM 40 MG: 40 TABLET, DELAYED RELEASE ORAL at 16:19

## 2022-09-11 RX ADMIN — PANTOPRAZOLE SODIUM 40 MG: 40 TABLET, DELAYED RELEASE ORAL at 05:55

## 2022-09-11 RX ADMIN — POLYETHYLENE GLYCOL 3350 17 G: 17 POWDER, FOR SOLUTION ORAL at 09:44

## 2022-09-11 RX ADMIN — OXYCODONE 5 MG: 5 TABLET ORAL at 13:26

## 2022-09-11 RX ADMIN — ALBUTEROL SULFATE 2 PUFF: 90 AEROSOL, METERED RESPIRATORY (INHALATION) at 19:50

## 2022-09-11 RX ADMIN — GUAIFENESIN 1200 MG: 600 TABLET ORAL at 21:24

## 2022-09-11 RX ADMIN — GABAPENTIN 300 MG: 300 CAPSULE ORAL at 21:25

## 2022-09-11 RX ADMIN — APIXABAN 5 MG: 5 TABLET, FILM COATED ORAL at 21:25

## 2022-09-11 RX ADMIN — GLIPIZIDE 5 MG: 5 TABLET ORAL at 09:43

## 2022-09-11 RX ADMIN — METFORMIN HYDROCHLORIDE 500 MG: 500 TABLET ORAL at 09:43

## 2022-09-11 RX ADMIN — FUROSEMIDE 40 MG: 40 TABLET ORAL at 09:44

## 2022-09-11 RX ADMIN — NAPROXEN 500 MG: 250 TABLET ORAL at 09:44

## 2022-09-11 RX ADMIN — GUAIFENESIN 1200 MG: 600 TABLET ORAL at 09:43

## 2022-09-11 RX ADMIN — SODIUM CHLORIDE, PRESERVATIVE FREE 10 ML: 5 INJECTION INTRAVENOUS at 21:26

## 2022-09-11 RX ADMIN — SODIUM CHLORIDE, PRESERVATIVE FREE 10 ML: 5 INJECTION INTRAVENOUS at 09:45

## 2022-09-11 RX ADMIN — ACETAMINOPHEN 650 MG: 325 TABLET ORAL at 21:24

## 2022-09-11 RX ADMIN — ANTI-FUNGAL POWDER MICONAZOLE NITRATE TALC FREE: 1.42 POWDER TOPICAL at 10:02

## 2022-09-11 RX ADMIN — INSULIN GLARGINE 14 UNITS: 100 INJECTION, SOLUTION SUBCUTANEOUS at 21:20

## 2022-09-11 RX ADMIN — CALCITONIN SALMON 1 SPRAY: 200 SPRAY, METERED NASAL at 10:01

## 2022-09-11 RX ADMIN — METFORMIN HYDROCHLORIDE 500 MG: 500 TABLET ORAL at 16:20

## 2022-09-11 RX ADMIN — TRAMADOL HYDROCHLORIDE 50 MG: 50 TABLET ORAL at 16:20

## 2022-09-11 RX ADMIN — ANTI-FUNGAL POWDER MICONAZOLE NITRATE TALC FREE: 1.42 POWDER TOPICAL at 21:31

## 2022-09-11 RX ADMIN — APIXABAN 5 MG: 5 TABLET, FILM COATED ORAL at 09:43

## 2022-09-11 RX ADMIN — ACETAMINOPHEN 650 MG: 325 TABLET ORAL at 09:44

## 2022-09-11 RX ADMIN — FLUTICASONE PROPIONATE 1 SPRAY: 50 SPRAY, METERED NASAL at 09:45

## 2022-09-11 RX ADMIN — GABAPENTIN 300 MG: 300 CAPSULE ORAL at 09:43

## 2022-09-11 RX ADMIN — ATORVASTATIN CALCIUM 20 MG: 20 TABLET, FILM COATED ORAL at 21:25

## 2022-09-11 RX ADMIN — GABAPENTIN 300 MG: 300 CAPSULE ORAL at 14:26

## 2022-09-11 RX ADMIN — MONTELUKAST 10 MG: 10 TABLET, FILM COATED ORAL at 21:24

## 2022-09-11 RX ADMIN — SENNOSIDES AND DOCUSATE SODIUM 2 TABLET: 8.6; 5 TABLET ORAL at 09:43

## 2022-09-11 RX ADMIN — OXYCODONE 5 MG: 5 TABLET ORAL at 05:55

## 2022-09-11 RX ADMIN — NAPROXEN 500 MG: 250 TABLET ORAL at 16:20

## 2022-09-11 RX ADMIN — AMIODARONE HYDROCHLORIDE 200 MG: 200 TABLET ORAL at 09:43

## 2022-09-11 RX ADMIN — LATANOPROST 1 DROP: 50 SOLUTION OPHTHALMIC at 21:31

## 2022-09-11 RX ADMIN — CLOPIDOGREL BISULFATE 75 MG: 75 TABLET ORAL at 09:44

## 2022-09-11 ASSESSMENT — PAIN SCALES - GENERAL
PAINLEVEL_OUTOF10: 0
PAINLEVEL_OUTOF10: 7
PAINLEVEL_OUTOF10: 0
PAINLEVEL_OUTOF10: 7
PAINLEVEL_OUTOF10: 7
PAINLEVEL_OUTOF10: 0
PAINLEVEL_OUTOF10: 0

## 2022-09-11 ASSESSMENT — PAIN DESCRIPTION - DESCRIPTORS
DESCRIPTORS: SPASM
DESCRIPTORS: ACHING
DESCRIPTORS: ACHING

## 2022-09-11 ASSESSMENT — PAIN DESCRIPTION - LOCATION
LOCATION: HIP

## 2022-09-11 ASSESSMENT — PAIN DESCRIPTION - ORIENTATION: ORIENTATION: RIGHT;LEFT

## 2022-09-11 NOTE — PROGRESS NOTES
SBAR received from Paulina Stoddard RN. Patient resting quietly in bed. Alert and oriented x4. Respirations even and unlabored on room air. NAD noted. Call light within reach, Encouraged to call for any needs. Voiced understanding. Denies needs at present. Denies pain.

## 2022-09-11 NOTE — PROGRESS NOTES
Patient resting quietly in bed. Respirations even and unlabored with CPAP 2L. NAD noted. No events through the night.

## 2022-09-11 NOTE — FLOWSHEET NOTE
09/11/22 0555   Vital Signs   Resp 18   Pain Assessment   Pain Assessment 0-10   Pain Level 7   Patient's Stated Pain Goal 0 - No pain   Pain Location Hip   Pain Orientation Right;Left   Pain Descriptors Spasm   Opioid-Induced Sedation   POSS Score 1     Roxicodone 5mg PO given

## 2022-09-11 NOTE — FLOWSHEET NOTE
09/11/22 0630   Pain Assessment   Pain Assessment None - Denies Pain   Pain Level 0   Response to Pain Intervention Patient satisfied   Side Effects No reported side effects

## 2022-09-11 NOTE — PROGRESS NOTES
PHYSICAL THERAPY: Daily Note PM   (Link to Caseload Tracking: PT Visit Days : 3  Time In/Out PT Charge Capture  Rehab Caseload Tracker  Orders    Tamera Burger is a 65 y.o. male   PRIMARY DIAGNOSIS: Lumbar discitis  Lumbar discitis [M46.46]       Inpatient: Payor: MEDICARE / Plan: MEDICARE PART A AND B / Product Type: *No Product type* /     ASSESSMENT:     REHAB RECOMMENDATIONS:   Recommendation to date pending progress:  Setting:  Short-term Rehab    Equipment:    To Be Determined     ASSESSMENT:  Mr. Ana M Vann is supine and willing to participate with strong encouragement from myself and wife. He performed supine then seated exercises below. He did good with the exercises but had poor sitting balance the more fatigued he got. No real progress.      SUBJECTIVE:   Mr. Ana M Vann states, Ellisedgard Collazo are coming to give me an enema\"     Social/Functional Lives With: Spouse  Type of Home: House  Home Layout: Two level  Home Access: Stairs to enter with rails  Entrance Stairs - Number of Steps: 4  Entrance Stairs - Rails: Left  Bathroom Shower/Tub: Walk-in shower  Bathroom Toilet: Handicap height  Bathroom Equipment: Grab bars in shower, Toilet raiser  Bathroom Accessibility: Accessible  Home Equipment: [place], [place], [place], [address] Help From: Family  ADL Assistance: Independent  Homemaking Assistance: Independent  Homemaking Responsibilities: No  Ambulation Assistance: Independent  Transfer Assistance: Independent  Active : Yes  Mode of Transportation: Car  Occupation: Retired  OBJECTIVE:     PAIN: VITALS / O2: PRECAUTION / Clifm Patrick / Saumya Bristol:   Pre Treatment:          Post Treatment: 4 Vitals        Oxygen    None    RESTRICTIONS/PRECAUTIONS:        MOBILITY: I Mod I S SBA CGA Min Mod Max Total  NT x2 Comments:   Bed Mobility    Rolling [] [] [] [] [] [x] [] [] [] [] []    Supine to Sit [] [] [] [] [] [] [x] [] [] [] []    Scooting [] [] [] [] [] [x] [x] [] [] [] []    Sit to Supine [] [] [] [] [] [] [x] [] [] [] []    Transfers    Sit to Stand [] [] [] [] [] [] [] [] [] [] []    Bed to Chair [] [] [] [] [] [] [] [] [] [] []    Stand to Sit [] [] [] [] [] [] [] [] [] [] []     [] [] [] [] [] [] [] [] [] [] []    I=Independent, Mod I=Modified Independent, S=Supervision, SBA=Standby Assistance, CGA=Contact Guard Assistance,   Min=Minimal Assistance, Mod=Moderate Assistance, Max=Maximal Assistance, Total=Total Assistance, NT=Not Tested    BALANCE: Good Fair+ Fair Fair- Poor NT Comments   Sitting Static [] [] [x] [x] [] []    Sitting Dynamic [] [] [x] [x] [] []              Standing Static [] [] [] [] [] []    Standing Dynamic [] [] [] [] [] []      GAIT: I Mod I S SBA CGA Min Mod Max Total  NT x2 Comments:   Level of Assistance [] [] [] [] [] [] [] [] [] [] []    Distance   feet    DME N/A    Gait Quality N/A    Weightbearing Status      Stairs      I=Independent, Mod I=Modified Independent, S=Supervision, SBA=Standby Assistance, CGA=Contact Guard Assistance,   Min=Minimal Assistance, Mod=Moderate Assistance, Max=Maximal Assistance, Total=Total Assistance, NT=Not Tested    PLAN:   ACUTE PHYSICAL THERAPY GOALS:   (Developed with and agreed upon by patient and/or caregiver.)     (1.) Eugena Range  will move from supine to sit and sit to supine  with CONTACT GUARD ASSIST within 7 treatment day(s). (2.) Eugena Range will transfer from bed to chair and chair to bed with MINIMAL ASSIST using the least restrictive device within 7 treatment day(s). (3.) Eugena Range will ambulate with MINIMAL ASSIST for 50 feet with the least restrictive device within 7 treatment day(s). (4.) Eugena Range will perform standing static and dynamic balance activities x 10 minutes with CONTACT GUARD ASSIST to improve safety within 7 treatment day(s).   (5.) Eugena Range will perform bilateral lower extremity exercises x 20 min for HEP with INDEPENDENCE to improve strength, endurance, and functional mobility within 7 treatment day(s). FREQUENCY AND DURATION: Daily for duration of hospital stay or until stated goals are met, whichever comes first.    TREATMENT:   TREATMENT:   Therapeutic Activity (10 Minutes): Therapeutic activity included Rolling, Supine to Sit, Sit to Supine, Scooting, and Sitting balance  to improve functional Activity tolerance, Balance, Mobility, and Strength. Therapeutic Exercise (20 Minutes): Therapeutic exercises noted below to improve functional activity tolerance, AROM, strength, and mobility.      TREATMENT GRID:   Date:  9/9/22 Date:  9/11/22 Date:     Activity/Exercise Parameters Parameters Parameters   Seated TKE 5x2 B 10x B    Seated marching 10x B     Seated arm raises 5x B     Seated punching 10x B     Supine AP  20x B    Heel slides  10x B    SAQ  10x B    Supine hip abd  10x B    Seated row (pulling me toward him)  10x B        AFTER TREATMENT PRECAUTIONS: Bed, Bed/Chair Locked, Call light within reach, Needs within reach, and RN notified    INTERDISCIPLINARY COLLABORATION:  RN/ PCT and PT/ PTA    EDUCATION:      TIME IN/OUT:  Time In: 1350  Time Out: [id number]  Minutes: 30    Adriane Ramos PTA

## 2022-09-11 NOTE — PROGRESS NOTES
Hospitalist Progress Note   Admit Date:  2022  7:22 PM   Name:  Rudy Cosby   Age:  65 y.o. Sex:  male  :  1945   MRN:  [id number]   Room:  7/    Presenting Complaint: No chief complaint on file. Reason(s) for Admission: Lumbar discitis [facility] Course: Rudy Cosby is a 65 y.o. male with a past medical history significant for ischemic cardiomyopathy with ICD/pacemaker, EF of 30 to 35%, A. fib on Eliquis, diabetes, obesity, peripheral artery disease anemia, COPD with asthma, coronary artery disease, obstructive sleep apnea on CPAP with recent L2 L4 kyphoplasty who was recently charged from [facility] 2022 to [facility] after being admitted for UTI worsening weakness status post surgery with several falls. MRI of the lumbar spine and cervical spine were done and he was found to have a lumbar spine epidural abscess and symptoms abscess. His CRP was elevated at 12.4. He had noted osteomyelitis of the lower spine. His oral anticoagulation was held and he was placed on a heparin drip. He was also placed on vancomycin and ceftriaxone after IR drain abscess. He was followed by infectious disease and treated for 6 weeks with Vanco and Rocephin. He developed altered mental status. This was thought to be due to metabolic cause. Unfortunately he started having increasing complaints of back pain confusion. He was transferred to [facility] for ongoing therapy and antibiotic needs. Cardiology was consulted and his ICD was interrogated and found to be functioning well. A Tomas was placed due to his urinary retention. Urology was consulted. He had a complete altered mental status work-up and it was thought he had acute encephalopathy secondary to especially opiates and antipsychotics. He has been weaning off Zyprexa with plans to continue IV antibiotics for total of 6 weeks with estimated end of therapy 2022.   He was transferred to inpatient rehab floor on August 26, 2022. He was continued on antibiotics he continued to have lower back pain and has been poorly participative in therapy over the last few days. MRI of the spine done today showed an apparent worsening osteomyelitis and psoas muscle abscess. It also showed compression of the cauda equina nerve roots at multiple levels in the lumbar spine as before. Multilevel degenerative disc disease and facet arthropathy. Bilateral psoas muscle myositis with new left psoas muscle abscess at the level of L3-L4. Because of the abscesses ID was sultan. And recommended stopping all antibiotics at this time and getting IR to aspirate the abscess for culture likely on Monday of next week. Dr. Dea Meier was also consulted and felt no surgical role indicated at this time and that his degenerative stenosis can be addressed once he is infection free. As the patient is no longer able to participate in therapy it was thought best to transfer him back to the hospital.         Subjective & 24hr Events (09/11/22): Patient seen at bedside, resting in bed comfortably. He reports feeling okay, denies any fever, chills, nausea vomiting. Continues to complain of constipation. ROS:  10 point review of system is negative except for what mentioned above. Assessment & Plan:     Principal Problem:  Lumbar spine epidural abscess, psoas abscess, osteomyelitis of the lower spine. Complicated wound infection  9/11-  Initially the plan was to continue Rocephin and vancomycin till 9/9/2022. As per IDs recommendation, patient will be on antibiotic holiday IR consulted for abscess drainage for 9/12/22  Pain is adequately controlled with as needed Dilaudid, oxycodone and tramadol. Continue MiraLAX, ordered 1 dose of enema.      active Problems:       General weakness    Back pain  9/11/2022  Multifactorial secondary to abscess and osteomyelitis that worsened  Will continue supportive care  Continue pain management            Anemia  Hemoglobin 8.4        Ischemic cardiomyopathy   ICD (implantable cardioverter-defibrillator) in place    Atrial fibrillation (HCC)  HTN (hypertension)  9/11/2022  BP is optimally controlled with Coreg. Pulmonary emphysema (HCC)  COPD (chronic obstructive pulmonary disease) ([facility]   Obstructive sleep apnea  9/11/2022  Patient is currently on room air. Continue ProAir HFA, Mucinex and Dulera. Nicotine patch TD. Cigarette nicotine dependence in remission  Chewing tobacco dependence  9/11/2022  Patient encouraged to stop chewing tobacco.  Cessation advised. Started on nicotine patch         Diabetic neuropathy (HCC)  Chronic pain of right knee  9/11/2022     Continue gabapentin  Continue pain meds           PAD (peripheral artery disease) ([facility]  9/11/2022  Continue Plavix and Lipitor. Hyperlipidemia  Continue Lipitor      DM (diabetes mellitus) type II, controlled, with peripheral vascular disorder ([facility]  9/11/2022  Blood glucose is optimally controlled, continue glipizide 5 mg p.o. daily, metformin 500 mg p.o. twice daily, Lantus 40 units subcu nightly along with sliding scale. MINI (acute kidney injury) ([facility]  9/11/2022  Resolved  Avoid nephrotoxins  Dose medications for his renal function     Leg cramping and muscle spasms  9/11/2022  Flexeril. Anticipated discharge needs:    Pending clinical course    I spent 35 minutes of time caring for this patient on the unit nearby or at bedside, and more than 50 percent was spent on coordination of care activities, and/or patient/family counseling regarding status and plan of care. Diet:  ADULT DIET; Regular; 3 carb choices (45 gm/meal)  ADULT ORAL NUTRITION SUPPLEMENT; Breakfast, Dinner, Lunch; Other Oral Supplement;  Chocolate only  DVT PPx: Eliquis  Code status: Full Code    Hospital Problems:  Principal Problem:    Lumbar discitis  Active Problems:    ICD (implantable cardioverter-defibrillator) in place    Physical debility    Psoas muscle abscess (HCC)    Atrial fibrillation (HCC)    HTN (hypertension)    Pulmonary emphysema (HCC)    Severe obesity (BMI 35.0-39. 9) with comorbidity ([facility]    Diabetic neuropathy ([facility]    Sleep apnea    Ventricular tachycardia (HCC)    PAD (peripheral artery disease) ([facility]  Resolved Problems:    * No resolved hospital problems. *      Objective:   Patient Vitals for the past 24 hrs:   Temp Pulse Resp BP SpO2   09/11/22 0759 97.9 °F (36.6 °C) 61 -- 133/71 93 %   09/11/22 0555 -- -- 18 -- --   09/11/22 0317 97.5 °F (36.4 °C) 60 18 124/65 92 %   09/10/22 2333 97.4 °F (36.3 °C) 60 20 102/65 94 %   09/10/22 1923 98.1 °F (36.7 °C) 59 18 (!) 146/70 92 %   09/10/22 1515 97.7 °F (36.5 °C) 63 16 105/65 93 %   09/10/22 1245 97.7 °F (36.5 °C) 59 16 109/61 92 %   09/10/22 1223 97.7 °F (36.5 °C) 60 -- -- 92 %   09/10/22 0830 -- 65 16 -- 92 %         Oxygen Therapy  SpO2: 93 %  Pulse Oximeter Device Mode: Intermittent  Pulse Oximeter Device Location: Right, Finger  O2 Device: PAP (positive airway pressure)  Skin Assessment: Clean, dry, & intact  FiO2 : 28 %  O2 Flow Rate (L/min): 2 L/min  O2 Delivery Method: CPAP/Bi-PAP mask    Estimated body mass index is 31.97 kg/m² as calculated from the following:    Height as of this encounter: 6' 1\" (1.854 m). Weight as of this encounter: 242 lb 4.8 oz (109.9 kg). Intake/Output Summary (Last 24 hours) at 9/11/2022 0809  Last data filed at 9/10/2022 1923  Gross per 24 hour   Intake --   Output 500 ml   Net -500 ml           Physical Exam:     Blood pressure 133/71, pulse 61, temperature 97.9 °F (36.6 °C), resp. rate 18, height 6' 1\" (1.854 m), weight 242 lb 4.8 oz (109.9 kg), SpO2 93 %. General:    Alert, awake, NAD, on room air  HEENT:          Head NCAT, PERRLA positive, MMM  CV:   RRR. No m/r/g. No jugular venous distension. Lungs:   CTAB. No wheezing, rhonchi, or rales. Symmetric expansion. Abdomen:    Bowel sounds present. Soft, nontender, nondistended. Extremities: No cyanosis or clubbing. No edema  Skin:     No rashes and normal coloration. Warm and dry. Neuro:  GCS 15, cranial nerves intact, no motor deficit, mild sensory loss in bilateral lower extremity  Psych:  AOx3, mood and affect appropriate  Point tenderness in lower back appreciated    I have personally reviewed labs and tests showing:  Recent Labs:  Recent Results (from the past 48 hour(s))   POCT Glucose    Collection Time: 09/09/22 11:39 AM   Result Value Ref Range    POC Glucose 114 (H) 65 - 100 mg/dL    Performed by: [employer]    POCT Glucose    Collection Time: 09/09/22  4:31 PM   Result Value Ref Range    POC Glucose 113 (H) 65 - 100 mg/dL    Performed by: [employer]    POCT Glucose    Collection Time: 09/09/22  9:10 PM   Result Value Ref Range    POC Glucose 115 (H) 65 - 100 mg/dL    Performed by: [employer]    POCT Glucose    Collection Time: 09/10/22  7:59 AM   Result Value Ref Range    POC Glucose 118 (H) 65 - 100 mg/dL    Performed by: [employer]    POCT Glucose    Collection Time: 09/10/22 12:18 PM   Result Value Ref Range    POC Glucose 127 (H) 65 - 100 mg/dL    Performed by: [employer]    POCT Glucose    Collection Time: 09/10/22  5:04 PM   Result Value Ref Range    POC Glucose 183 (H) 65 - 100 mg/dL    Performed by: Joe    POCT Glucose    Collection Time: 09/10/22  8:50 PM   Result Value Ref Range    POC Glucose 131 (H) 65 - 100 mg/dL    Performed by: Ivonne Suarez        I have personally reviewed imaging studies showing:   Other Studies:  No orders to display       Current Meds:  Current Facility-Administered Medications   Medication Dose Route Frequency    nicotine (NICODERM CQ) 21 MG/24HR 1 patch  1 patch TransDERmal Daily    albuterol sulfate HFA (PROVENTIL;VENTOLIN;PROAIR) 108 (90 Base) MCG/ACT inhaler 2 puff  2 puff Inhalation BID    mometasone-formoterol (DULERA) 100-5 MCG/ACT inhaler 2 puff  2 puff Inhalation BID    HYDROmorphone HCl PF (DILAUDID) injection 0.25 mg  0.25 mg IntraVENous Q4H PRN    HYDROmorphone HCl PF (DILAUDID) injection 0.5 mg  0.5 mg IntraVENous Q4H PRN    acetaminophen (TYLENOL) tablet 650 mg  650 mg Oral TID    fluticasone (FLONASE) 50 MCG/ACT nasal spray 1 spray  1 spray Each Nostril Daily    furosemide (LASIX) tablet 40 mg  40 mg Oral Daily    gabapentin (NEURONTIN) capsule 300 mg  300 mg Oral TID    glipiZIDE (GLUCOTROL) tablet 5 mg  5 mg Oral QAM AC    guaiFENesin (MUCINEX) extended release tablet 1,200 mg  1,200 mg Oral BID    insulin glargine (LANTUS) injection vial 14 Units  14 Units SubCUTAneous Nightly    insulin lispro (HUMALOG) injection vial 0-16 Units  0-16 Units SubCUTAneous TID WC    insulin lispro (HUMALOG) injection vial 0-4 Units  0-4 Units SubCUTAneous Nightly    latanoprost (XALATAN) 0.005 % ophthalmic solution 1 drop  1 drop Both Eyes Nightly    lidocaine 4 % external patch 1 patch  1 patch TransDERmal Daily    melatonin tablet 6 mg  6 mg Oral Nightly    metFORMIN (GLUCOPHAGE) tablet 500 mg  500 mg Oral BID WC    miconazole (MICOTIN) 2 % powder   Topical BID    montelukast (SINGULAIR) tablet 10 mg  10 mg Oral Nightly    naproxen (NAPROSYN) tablet 500 mg  500 mg Oral BID WC    pantoprazole (PROTONIX) tablet 40 mg  40 mg Oral BID AC    polyethylene glycol (GLYCOLAX) packet 17 g  17 g Oral Daily    sennosides-docusate sodium (SENOKOT-S) 8.6-50 MG tablet 2 tablet  2 tablet Oral Daily    sodium chloride flush 0.9 % injection 10 mL  10 mL IntraVENous BID    amiodarone (CORDARONE) tablet 200 mg  200 mg Oral Daily    traZODone (DESYREL) tablet 25 mg  25 mg Oral Nightly    albuterol sulfate HFA (PROVENTIL;VENTOLIN;PROAIR) 108 (90 Base) MCG/ACT inhaler 2 puff  2 puff Inhalation Q6H PRN    bisacodyl (DULCOLAX) suppository 10 mg  10 mg Rectal Daily PRN    dextrose 10 % infusion   IntraVENous Continuous PRN    dextrose bolus 10% 125 mL  125 mL IntraVENous PRN    Or

## 2022-09-11 NOTE — PROGRESS NOTES
Assumed care of patient. Assessment completed and documented, see docflow. Patient A/Ox3, resting quietly in bed. NAD noted at present. Respirations even and non labored on RA. Bilateral heel boots in place. Patient verbalized understanding to call for needs. Call light within reach. Will continue to monitor.

## 2022-09-11 NOTE — PROGRESS NOTES
Milk and molasses enema ordered for constipation. Turned patient to left side, prepped patient. Immediate resistance upon insertion. Patient tolerated half of enema with minimal light brown stool return. Will continue to monitor.

## 2022-09-12 LAB
GLUCOSE BLD STRIP.AUTO-MCNC: 101 MG/DL (ref 65–100)
GLUCOSE BLD STRIP.AUTO-MCNC: 104 MG/DL (ref 65–100)
GLUCOSE BLD STRIP.AUTO-MCNC: 168 MG/DL (ref 65–100)
GLUCOSE BLD STRIP.AUTO-MCNC: 88 MG/DL (ref 65–100)
SERVICE CMNT-IMP: ABNORMAL
SERVICE CMNT-IMP: NORMAL

## 2022-09-12 PROCEDURE — 2700000000 HC OXYGEN THERAPY PER DAY

## 2022-09-12 PROCEDURE — 94640 AIRWAY INHALATION TREATMENT: CPT

## 2022-09-12 PROCEDURE — 1100000000 HC RM PRIVATE

## 2022-09-12 PROCEDURE — 2580000003 HC RX 258: Performed by: INTERNAL MEDICINE

## 2022-09-12 PROCEDURE — 97530 THERAPEUTIC ACTIVITIES: CPT

## 2022-09-12 PROCEDURE — 94760 N-INVAS EAR/PLS OXIMETRY 1: CPT

## 2022-09-12 PROCEDURE — 97112 NEUROMUSCULAR REEDUCATION: CPT

## 2022-09-12 PROCEDURE — 97110 THERAPEUTIC EXERCISES: CPT

## 2022-09-12 PROCEDURE — 6370000000 HC RX 637 (ALT 250 FOR IP): Performed by: INTERNAL MEDICINE

## 2022-09-12 PROCEDURE — 82962 GLUCOSE BLOOD TEST: CPT

## 2022-09-12 RX ADMIN — ATORVASTATIN CALCIUM 20 MG: 20 TABLET, FILM COATED ORAL at 22:07

## 2022-09-12 RX ADMIN — ACETAMINOPHEN 650 MG: 325 TABLET ORAL at 15:04

## 2022-09-12 RX ADMIN — ALBUTEROL SULFATE 2 PUFF: 90 AEROSOL, METERED RESPIRATORY (INHALATION) at 07:43

## 2022-09-12 RX ADMIN — PANTOPRAZOLE SODIUM 40 MG: 40 TABLET, DELAYED RELEASE ORAL at 05:42

## 2022-09-12 RX ADMIN — GABAPENTIN 300 MG: 300 CAPSULE ORAL at 15:04

## 2022-09-12 RX ADMIN — METFORMIN HYDROCHLORIDE 500 MG: 500 TABLET ORAL at 09:03

## 2022-09-12 RX ADMIN — FUROSEMIDE 40 MG: 40 TABLET ORAL at 09:04

## 2022-09-12 RX ADMIN — LATANOPROST 1 DROP: 50 SOLUTION OPHTHALMIC at 22:10

## 2022-09-12 RX ADMIN — FERROUS GLUCONATE 324 MG: 324 TABLET ORAL at 09:04

## 2022-09-12 RX ADMIN — ANTI-FUNGAL POWDER MICONAZOLE NITRATE TALC FREE: 1.42 POWDER TOPICAL at 22:09

## 2022-09-12 RX ADMIN — PANTOPRAZOLE SODIUM 40 MG: 40 TABLET, DELAYED RELEASE ORAL at 15:04

## 2022-09-12 RX ADMIN — SODIUM CHLORIDE, PRESERVATIVE FREE 10 ML: 5 INJECTION INTRAVENOUS at 22:07

## 2022-09-12 RX ADMIN — ALBUTEROL SULFATE 2 PUFF: 90 AEROSOL, METERED RESPIRATORY (INHALATION) at 19:40

## 2022-09-12 RX ADMIN — ANTI-FUNGAL POWDER MICONAZOLE NITRATE TALC FREE: 1.42 POWDER TOPICAL at 09:05

## 2022-09-12 RX ADMIN — GABAPENTIN 300 MG: 300 CAPSULE ORAL at 22:06

## 2022-09-12 RX ADMIN — MONTELUKAST 10 MG: 10 TABLET, FILM COATED ORAL at 22:07

## 2022-09-12 RX ADMIN — CALCITONIN SALMON 1 SPRAY: 200 SPRAY, METERED NASAL at 09:04

## 2022-09-12 RX ADMIN — INSULIN GLARGINE 14 UNITS: 100 INJECTION, SOLUTION SUBCUTANEOUS at 22:03

## 2022-09-12 RX ADMIN — Medication 6 MG: at 22:07

## 2022-09-12 RX ADMIN — CARVEDILOL 6.25 MG: 3.12 TABLET, FILM COATED ORAL at 16:46

## 2022-09-12 RX ADMIN — ACETAMINOPHEN 650 MG: 325 TABLET ORAL at 22:07

## 2022-09-12 RX ADMIN — GUAIFENESIN 1200 MG: 600 TABLET ORAL at 09:04

## 2022-09-12 RX ADMIN — METFORMIN HYDROCHLORIDE 500 MG: 500 TABLET ORAL at 16:46

## 2022-09-12 RX ADMIN — GUAIFENESIN 1200 MG: 600 TABLET ORAL at 22:07

## 2022-09-12 RX ADMIN — SODIUM CHLORIDE, PRESERVATIVE FREE 10 ML: 5 INJECTION INTRAVENOUS at 09:11

## 2022-09-12 RX ADMIN — AMIODARONE HYDROCHLORIDE 200 MG: 200 TABLET ORAL at 09:04

## 2022-09-12 RX ADMIN — NAPROXEN 500 MG: 250 TABLET ORAL at 09:03

## 2022-09-12 RX ADMIN — TRAZODONE HYDROCHLORIDE 25 MG: 50 TABLET ORAL at 22:06

## 2022-09-12 RX ADMIN — ACETAMINOPHEN 650 MG: 325 TABLET ORAL at 09:03

## 2022-09-12 RX ADMIN — OXYCODONE 5 MG: 5 TABLET ORAL at 09:04

## 2022-09-12 RX ADMIN — CARVEDILOL 6.25 MG: 3.12 TABLET, FILM COATED ORAL at 09:04

## 2022-09-12 RX ADMIN — SENNOSIDES AND DOCUSATE SODIUM 2 TABLET: 8.6; 5 TABLET ORAL at 09:03

## 2022-09-12 RX ADMIN — GABAPENTIN 300 MG: 300 CAPSULE ORAL at 09:04

## 2022-09-12 RX ADMIN — GLIPIZIDE 5 MG: 5 TABLET ORAL at 09:04

## 2022-09-12 RX ADMIN — NAPROXEN 500 MG: 250 TABLET ORAL at 16:46

## 2022-09-12 RX ADMIN — FLUTICASONE PROPIONATE 1 SPRAY: 50 SPRAY, METERED NASAL at 09:05

## 2022-09-12 ASSESSMENT — PAIN SCALES - GENERAL
PAINLEVEL_OUTOF10: 0
PAINLEVEL_OUTOF10: 0
PAINLEVEL_OUTOF10: 7
PAINLEVEL_OUTOF10: 0

## 2022-09-12 ASSESSMENT — PAIN DESCRIPTION - DESCRIPTORS: DESCRIPTORS: SHARP;SHOOTING;SPASM

## 2022-09-12 ASSESSMENT — PAIN DESCRIPTION - LOCATION: LOCATION: BACK;PELVIS

## 2022-09-12 NOTE — PROGRESS NOTES
OCCUPATIONAL THERAPY: Daily Note AM   OT Visit Days: 3   Time  OT Charge Capture  Rehab Caseload Tracker  OT Orders    Courtney Kennedy is a 65 y.o. male   PRIMARY DIAGNOSIS: Lumbar discitis  Lumbar discitis [M46.46]       Inpatient: Payor: MEDICARE / Plan: MEDICARE PART A AND B / Product Type: *No Product type* /     ASSESSMENT:     REHAB RECOMMENDATIONS: CURRENT LEVEL OF FUNCTION:  (Most Recently Demonstrated)   Recommendation to date pending progress:  Setting:  Short-term Rehab    Equipment:    To Be Determined Bathing:  Not Tested  Dressing:  Not Tested  Feeding/Grooming:  Not Tested  Toileting: Total Assist-brief change, bladder hyg. Functional Mobility:  Moderate Assist-bed mob. ASSESSMENT:  AM-  Mr. Ernesto Cadena was supine in bed upon arrival. Pt completed rolling with mod A. Pt required total A for bladder, bowel hygiene and brief  change. Pt  completed  bed mobility with mod A X 2. Pt sat EOB and participated in exercises with PTA. COLETTE sat behind pt correcting pt's sitting balance. Continue POC.           SUBJECTIVE:     Mr. Ernesto Cadena states, \"Hey\"     Social/Functional Lives With: Spouse  Type of Home: House  Home Layout: Two level  Home Access: Stairs to enter with rails  Entrance Stairs - Number of Steps: 4  Entrance Stairs - Rails: Left  Bathroom Shower/Tub: Walk-in shower  Bathroom Toilet: Handicap height  Bathroom Equipment: Grab bars in shower, Toilet raiser  Bathroom Accessibility: Accessible  Home Equipment: Hardik García, [address] Help From: Family  ADL Assistance: Independent  Homemaking Assistance: Independent  Homemaking Responsibilities: No  Ambulation Assistance: Independent  Transfer Assistance: Independent  Active : Yes  Mode of Transportation: Car  Occupation: Retired    OBJECTIVE:     CARRIE / Mateo Pride / Sandy Yumi: IV    RESTRICTIONS/PRECAUTIONS:           PAIN: [facility] / O2:   Pre Treatment: 4             Post Treatment: 4 Vitals          Oxygen MOBILITY: I Mod I S SBA CGA Min Mod Max Total  NT x2 Comments:   Bed Mobility    Rolling [] [] [] [] [] [] [] [] [] [] []    Supine to Sit [] [] [] [] [] [] [x] [] [] [] [x]    Scooting [] [] [] [] [] [] [] [] [] [] []    Sit to Supine [] [] [] [] [] [] [x] [] [] [] [x]    Transfers    Sit to Stand [] [] [] [] [] [] [] [] [] [] []    Bed to Chair [] [] [] [] [] [] [] [] [] [] []    Stand to Sit [] [] [] [] [] [] [] [] [] [] []    Tub/Shower [] [] [] [] [] [] [] [] [] [] []     Toilet [] [] [] [] [] [] [] [] [] [] []      [] [] [] [] [] [] [] [] [] [] []    I=Independent, Mod I=Modified Independent, S=Supervision/Setup, SBA=Standby Assistance, CGA=Contact Guard Assistance, Min=Minimal Assistance, Mod=Moderate Assistance, Max=Maximal Assistance, Total=Total Assistance, NT=Not Tested    ACTIVITIES OF DAILY LIVING: I Mod I S SBA CGA Min Mod Max Total NT Comments   BASIC ADLs:              Upper Body   Bathing [] [] [] [] [] [] [] [] [] []    Lower Body Bathing [] [] [] [] [] [] [] [] [] []    Toileting [] [] [] [] [] [] [] [] [x] [] Hygiene and brief change    Upper Body Dressing [] [] [] [] [] [] [] [] [] []    Lower Body Dressing [] [] [] [] [] [] [] [] [] []    Feeding [] [] [] [] [] [] [] [] [] []    Grooming [] [] [] [] [] [] [] [] [] []    Personal Device Care [] [] [] [] [] [] [] [] [] []    Functional Mobility [] [] [] [] [] [] [] [] [] []    I=Independent, Mod I=Modified Independent, S=Supervision/Setup, SBA=Standby Assistance, CGA=Contact Guard Assistance, Min=Minimal Assistance, Mod=Moderate Assistance, Max=Maximal Assistance, Total=Total Assistance, NT=Not Tested    BALANCE: Good Fair+ Fair Fair- Poor NT Comments   Sitting Static [] [] [] [x] [] []    Sitting Dynamic [] [] [] [x] [] []              Standing Static [] [] [] [] [] []    Standing Dynamic [] [] [] [] [] []        PLAN:     FREQUENCY/DURATION   OT Plan of Care: 5 times/week for duration of hospital stay or until stated goals are met, whichever comes first.    ACUTE OCCUPATIONAL THERAPY GOALS:   (Developed with and agreed upon by patient and/or caregiver.)    (1.) Jean-Pierre Bunkers  will move from supine to sit and sit to supine  with CONTACT GUARD ASSIST within 7 treatment day(s). (2.) Jean-Pierre Bunkers will transfer from bed to chair and chair to bed with MINIMAL ASSIST using the least restrictive device within 7 treatment day(s). (3.) Jean-Pierre Bunkers will ambulate with MINIMAL ASSIST for 50 feet with the least restrictive device within 7 treatment day(s). (4.) Jean-Pierre Bunkers will perform standing static and dynamic balance activities x 10 minutes with CONTACT GUARD ASSIST to improve safety within 7 treatment day(s). (5.) Jean-Pierre Bunkers will perform bilateral lower extremity exercises x 20 min for HEP with INDEPENDENCE to improve strength, endurance, and functional mobility within 7 treatment day(s). TREATMENT:     TREATMENT:   AM-  Neuromuscular Re-education (30 Minutes): Neuromuscular Re-education included Balance Training, Coordination training, and Sitting balance training to improve Balance and Coordination.     TREATMENT GRID:  N/A    AFTER TREATMENT PRECAUTIONS: Alarm Activated, Bed, Bed/Chair Locked, Call light within reach, Needs within reach, RN notified, and Side rails x3    INTERDISCIPLINARY COLLABORATION:  RN/ PCT, PT/ PTA, and OT/ KATE    EDUCATION:       TOTAL TREATMENT DURATION AND TIME:    Time In: 0950  Time Out: [id number] Nick Fairchild  Minutes: [place], COLETTE

## 2022-09-12 NOTE — CARE COORDINATION
MSN CM:  patient to have abscess drained on Wednesday r/t anticoagulations. Patient and wife have chosen to be discharged to [facility]. Referral has been made. Case Management will continue to follow.

## 2022-09-12 NOTE — PROGRESS NOTES
Hospitalist Progress Note   Admit Date:  2022  7:22 PM   Name:  Vicente Menendez   Age:  65 y.o. Sex:  male  :  1945   MRN:  [id number]   Room:  807/    Presenting Complaint: No chief complaint on file. Reason(s) for Admission: Lumbar discitis [facility] Course: Vicente Menendez is a 65 y.o. male with a past medical history significant for ischemic cardiomyopathy with ICD/pacemaker, EF of 30 to 35%, A. fib on Eliquis, diabetes, obesity, peripheral artery disease anemia, COPD with asthma, coronary artery disease, obstructive sleep apnea on CPAP with recent L2 L4 kyphoplasty who was recently charged from [facility] 2022 to [facility] after being admitted for UTI worsening weakness status post surgery with several falls. MRI of the lumbar spine and cervical spine were done and he was found to have a lumbar spine epidural abscess and symptoms abscess. His CRP was elevated at 12.4. He had noted osteomyelitis of the lower spine. His oral anticoagulation was held and he was placed on a heparin drip. He was also placed on vancomycin and ceftriaxone after IR drain abscess. He was followed by infectious disease and treated for 6 weeks with Vanco and Rocephin. He developed altered mental status. This was thought to be due to metabolic cause. Unfortunately he started having increasing complaints of back pain confusion. He was transferred to [facility] for ongoing therapy and antibiotic needs. Cardiology was consulted and his ICD was interrogated and found to be functioning well. A Tomas was placed due to his urinary retention. Urology was consulted. He had a complete altered mental status work-up and it was thought he had acute encephalopathy secondary to especially opiates and antipsychotics. He has been weaning off Zyprexa with plans to continue IV antibiotics for total of 6 weeks with estimated end of therapy 2022.   He was transferred to inpatient rehab floor on August 26, 2022. He was continued on antibiotics he continued to have lower back pain and has been poorly participative in therapy over the last few days. MRI of the spine done today showed an apparent worsening osteomyelitis and psoas muscle abscess. It also showed compression of the cauda equina nerve roots at multiple levels in the lumbar spine as before. Multilevel degenerative disc disease and facet arthropathy. Bilateral psoas muscle myositis with new left psoas muscle abscess at the level of L3-L4. Because of the abscesses ID was sultan. And recommended stopping all antibiotics at this time and getting IR to aspirate the abscess for culture likely on Monday of next week. Dr. Jemal Ricci was also consulted and felt no surgical role indicated at this time and that his degenerative stenosis can be addressed once he is infection free. As the patient is no longer able to participate in therapy it was thought best to transfer him back to the hospital.         Subjective & 24hr Events (09/12/22): Patient seen at bedside, resting in bed comfortably. He is alert and awake, denies any fever chills, nausea vomiting or diarrhea. Reports of bilateral hip pain with lower back pain. Pain is not getting worse, denies any neurological deficits, bladder or bowel incontinence. No numbness tingling or weakness in lower extremities. Does report of having 2-3 bowel movement yesterday. ROS:  10 point review of system is negative except for what mentioned above. Assessment & Plan:     Principal Problem:  Lumbar spine epidural abscess, psoas abscess, osteomyelitis of the lower spine. Complicated wound infection  9/12-  Initially the plan was to continue Rocephin and vancomycin till 9/9/2022.   As per IDs recommendation, patient will be on antibiotic holiday IR consulted for abscess drainage for 9/12/22  Pain is adequately controlled with as needed Dilaudid, oxycodone discharge needs:    Pending clinical course    I spent 36 minutes of time caring for this patient on the unit nearby or at bedside, and more than 50 percent was spent on coordination of care activities, and/or patient/family counseling regarding status and plan of care. Diet:  ADULT DIET; Regular; 3 carb choices (45 gm/meal)  ADULT ORAL NUTRITION SUPPLEMENT; Breakfast, Dinner, Lunch; Other Oral Supplement; Chocolate only  DVT PPx: Eliquis  Code status: Full Code    Hospital Problems:  Principal Problem:    Lumbar discitis  Active Problems:    ICD (implantable cardioverter-defibrillator) in place    Physical debility    Psoas muscle abscess (HCC)    Atrial fibrillation (HCC)    HTN (hypertension)    Pulmonary emphysema (HCC)    Severe obesity (BMI 35.0-39. 9) with comorbidity ([address]    Diabetic neuropathy ([address]    Sleep apnea    Ventricular tachycardia (HCC)    PAD (peripheral artery disease) ([address]  Resolved Problems:    * No resolved hospital problems. *      Objective:   Patient Vitals for the past 24 hrs:   Temp Pulse Resp BP SpO2   09/12/22 0747 -- 66 18 -- 94 %   09/12/22 0736 97.9 °F (36.6 °C) 65 20 (!) 151/80 91 %   09/12/22 0327 97.5 °F (36.4 °C) 58 18 (!) 145/74 92 %   09/11/22 2326 97.5 °F (36.4 °C) 59 18 128/65 97 %   09/11/22 2002 97.9 °F (36.6 °C) 61 18 136/66 92 %   09/11/22 1627 97.5 °F (36.4 °C) 62 18 107/82 91 %   09/11/22 1212 97.9 °F (36.6 °C) 69 18 125/67 92 %         Oxygen Therapy  SpO2: 94 %  Pulse Oximeter Device Mode: Intermittent  Pulse Oximeter Device Location: Right, Finger  O2 Device: PAP (positive airway pressure)  Skin Assessment: Clean, dry, & intact  FiO2 : 28 %  O2 Flow Rate (L/min): 2 L/min  O2 Delivery Method: CPAP/Bi-PAP mask    Estimated body mass index is 31.97 kg/m² as calculated from the following:    Height as of this encounter: 6' 1\" (1.854 m). Weight as of this encounter: 242 lb 4.8 oz (109.9 kg).     Intake/Output Summary (Last 24 hours) at 9/12/2022 1052  Last data filed at 9/12/2022 0327  Gross per 24 hour   Intake 300 ml   Output 1000 ml   Net -700 ml           Physical Exam:     Blood pressure (!) 151/80, pulse 66, temperature 97.9 °F (36.6 °C), temperature source Oral, resp. rate 18, height 6' 1\" (1.854 m), weight 242 lb 4.8 oz (109.9 kg), SpO2 94 %. General:    Alert, awake, NAD, on room air  HEENT:          Head NCAT, PERRLA positive, MMM  CV:   RRR. No m/r/g. No jugular venous distension. Lungs:   CTAB. No wheezing, rhonchi, or rales. Symmetric expansion. Abdomen: Bowel sounds present. Soft, nontender, nondistended. Extremities: No cyanosis or clubbing. No edema  Skin:     No rashes and normal coloration. Warm and dry. Neuro:  GCS 15, cranial nerves intact, no motor deficit, mild sensory loss in bilateral lower extremity  Psych:  AOx3, mood and affect appropriate  Point tenderness in lower back appreciated    I have personally reviewed labs and tests showing:  Recent Labs:  Recent Results (from the past 48 hour(s))   POCT Glucose    Collection Time: 09/10/22 12:18 PM   Result Value Ref Range    POC Glucose 127 (H) 65 - 100 mg/dL    Performed by: ensembliitlin    POCT Glucose    Collection Time: 09/10/22  5:04 PM   Result Value Ref Range    POC Glucose 183 (H) 65 - 100 mg/dL    Performed by:  Joe    POCT Glucose    Collection Time: 09/10/22  8:50 PM   Result Value Ref Range    POC Glucose 131 (H) 65 - 100 mg/dL    Performed by: Jeannette    POCT Glucose    Collection Time: 09/11/22  7:59 AM   Result Value Ref Range    POC Glucose 122 (H) 65 - 100 mg/dL    Performed by: ensembliitlin    POCT Glucose    Collection Time: 09/11/22 12:12 PM   Result Value Ref Range    POC Glucose 118 (H) 65 - 100 mg/dL    Performed by: [facility]    POCT Glucose    Collection Time: 09/11/22  4:25 PM   Result Value Ref Range    POC Glucose 162 (H) 65 - 100 mg/dL    Performed by: ensembliitlin    POCT Glucose    Collection Time: 09/11/22  8:49 PM Result Value Ref Range    POC Glucose 186 (H) 65 - 100 mg/dL    Performed by: Samantha Garsia    POCT Glucose    Collection Time: 09/12/22  7:35 AM   Result Value Ref Range    POC Glucose 104 (H) 65 - 100 mg/dL    Performed by: Annelise        I have personally reviewed imaging studies showing:   Other Studies:  IR ABSCESS DRAINAGE PERC    (Results Pending)       Current Meds:  Current Facility-Administered Medications   Medication Dose Route Frequency    nicotine (NICODERM CQ) 21 MG/24HR 1 patch  1 patch TransDERmal Daily    albuterol sulfate HFA (PROVENTIL;VENTOLIN;PROAIR) 108 (90 Base) MCG/ACT inhaler 2 puff  2 puff Inhalation BID    mometasone-formoterol (DULERA) 100-5 MCG/ACT inhaler 2 puff  2 puff Inhalation BID    HYDROmorphone HCl PF (DILAUDID) injection 0.25 mg  0.25 mg IntraVENous Q4H PRN    HYDROmorphone HCl PF (DILAUDID) injection 0.5 mg  0.5 mg IntraVENous Q4H PRN    acetaminophen (TYLENOL) tablet 650 mg  650 mg Oral TID    fluticasone (FLONASE) 50 MCG/ACT nasal spray 1 spray  1 spray Each Nostril Daily    furosemide (LASIX) tablet 40 mg  40 mg Oral Daily    gabapentin (NEURONTIN) capsule 300 mg  300 mg Oral TID    glipiZIDE (GLUCOTROL) tablet 5 mg  5 mg Oral QAM AC    guaiFENesin (MUCINEX) extended release tablet 1,200 mg  1,200 mg Oral BID    insulin glargine (LANTUS) injection vial 14 Units  14 Units SubCUTAneous Nightly    insulin lispro (HUMALOG) injection vial 0-16 Units  0-16 Units SubCUTAneous TID WC    insulin lispro (HUMALOG) injection vial 0-4 Units  0-4 Units SubCUTAneous Nightly    latanoprost (XALATAN) 0.005 % ophthalmic solution 1 drop  1 drop Both Eyes Nightly    lidocaine 4 % external patch 1 patch  1 patch TransDERmal Daily    melatonin tablet 6 mg  6 mg Oral Nightly    metFORMIN (GLUCOPHAGE) tablet 500 mg  500 mg Oral BID WC    miconazole (MICOTIN) 2 % powder   Topical BID    montelukast (SINGULAIR) tablet 10 mg  10 mg Oral Nightly    naproxen (NAPROSYN) tablet 500 mg

## 2022-09-12 NOTE — PROGRESS NOTES
PHYSICAL THERAPY: Daily Note AM   (Link to Caseload Tracking: PT Visit Days : 4  Time In/Out PT Charge Capture  Rehab Caseload Tracker  Orders    Leonardo Rai is a 65 y.o. male   PRIMARY DIAGNOSIS: Lumbar discitis  Lumbar discitis [M46.46]       Inpatient: Payor: MEDICARE / Plan: MEDICARE PART A AND B / Product Type: *No Product type* /     ASSESSMENT:     REHAB RECOMMENDATIONS:   Recommendation to date pending progress:  Setting:  Short-term Rehab    Equipment:    To Be Determined     ASSESSMENT:  Mr. Howie Benites is supine and willing to participate with encouragement from myself and wife. He sat up with Mod A and sat EOB working on static and dynamic exercises. He continues to have poor sitting balance and low endurance especially without arm support.   No real progress     SUBJECTIVE:   Mr. Howie Benites states, \"ok\"     Social/Functional Lives With: Spouse  Type of Home: House  Home Layout: Two level  Home Access: Stairs to enter with rails  Entrance Stairs - Number of Steps: 4  Entrance Stairs - Rails: Left  Bathroom Shower/Tub: Walk-in shower  Bathroom Toilet: Handicap height  Bathroom Equipment: Grab bars in shower, Toilet raiser  Bathroom Accessibility: Accessible  Home Equipment: Hardik Lancaster, [address] Help From: Family  ADL Assistance: [address]: Independent  Homemaking Responsibilities: No  Ambulation Assistance: Independent  Transfer Assistance: Independent  Active : Yes  Mode of Transportation: Car  Occupation: Retired  OBJECTIVE:     PAIN: VITALS / O2: PRECAUTION / [place] García / Alexi Alias:   Pre Treatment:          Post Treatment: 4 Vitals        Oxygen    None    RESTRICTIONS/PRECAUTIONS:        MOBILITY: I Mod I S SBA CGA Min Mod Max Total  NT x2 Comments:   Bed Mobility    Rolling [] [] [] [] [] [x] [x] [] [] [] []    Supine to Sit [] [] [] [] [] [] [x] [] [] [] []    Scooting [] [] [] [] [] [] [x] [] [] [] [x]    Sit to Supine [] [] [] [] [] [] [x] [] [] [] [x]    Transfers    Sit to Stand [] [] [] [] [] [] [] [] [] [] []    Bed to Chair [] [] [] [] [] [] [] [] [] [] []    Stand to Sit [] [] [] [] [] [] [] [] [] [] []     [] [] [] [] [] [] [] [] [] [] []    I=Independent, Mod I=Modified Independent, S=Supervision, SBA=Standby Assistance, CGA=Contact Guard Assistance,   Min=Minimal Assistance, Mod=Moderate Assistance, Max=Maximal Assistance, Total=Total Assistance, NT=Not Tested    BALANCE: Good Fair+ Fair Fair- Poor NT Comments   Sitting Static [] [] [x] [x] [] []    Sitting Dynamic [] [] [] [x] [x] []              Standing Static [] [] [] [] [] []    Standing Dynamic [] [] [] [] [] []      GAIT: I Mod I S SBA CGA Min Mod Max Total  NT x2 Comments:   Level of Assistance [] [] [] [] [] [] [] [] [] [] []    Distance   feet    DME N/A    Gait Quality N/A    Weightbearing Status      Stairs      I=Independent, Mod I=Modified Independent, S=Supervision, SBA=Standby Assistance, CGA=Contact Guard Assistance,   Min=Minimal Assistance, Mod=Moderate Assistance, Max=Maximal Assistance, Total=Total Assistance, NT=Not Tested    PLAN:   ACUTE PHYSICAL THERAPY GOALS:   (Developed with and agreed upon by patient and/or caregiver.)     (1.) Rip Church  will move from supine to sit and sit to supine  with CONTACT GUARD ASSIST within 7 treatment day(s). (2.) Rip Church will transfer from bed to chair and chair to bed with MINIMAL ASSIST using the least restrictive device within 7 treatment day(s). (3.) Rip Church will ambulate with MINIMAL ASSIST for 50 feet with the least restrictive device within 7 treatment day(s). (4.) Rip Church will perform standing static and dynamic balance activities x 10 minutes with CONTACT GUARD ASSIST to improve safety within 7 treatment day(s).   (5.) Rip Church will perform bilateral lower extremity exercises x 20 min for HEP with INDEPENDENCE to improve strength, endurance, and functional mobility within 7 treatment day(s). FREQUENCY AND DURATION: Daily for duration of hospital stay or until stated goals are met, whichever comes first.    TREATMENT:   TREATMENT:   Co-Treatment PT/OT necessary due to patient's decreased overall endurance/tolerance levels, as well as need for high level skilled assistance to complete functional transfers/mobility and functional tasks  Therapeutic Activity (10 Minutes): Therapeutic activity included Rolling, Supine to Sit, Sit to Supine, Scooting, Lateral Scooting, and Sitting balance  to improve functional Activity tolerance, Balance, Coordination, Mobility, and Strength. Therapeutic Exercise (20 Minutes): Therapeutic exercises noted below to improve functional activity tolerance, AROM, strength, and mobility.      TREATMENT GRID:   Date:  9/9/22 Date:  9/11/22 Date:  9/12/22   Activity/Exercise Parameters Parameters Parameters   Seated TKE 5x2 B 10x B 10x B   Seated marching 10x B     Seated arm raises 5x B     Seated punching 10x B  Yamileth cake maybe 5 claps   Supine AP  20x B Seated 5x2 B   Heel slides  10x B    SAQ  10x B    Supine hip abd  10x B    Seated row (pulling me toward him)  10x B 10x B       AFTER TREATMENT PRECAUTIONS: Bed, Bed/Chair Locked, Call light within reach, Needs within reach, and RN notified    INTERDISCIPLINARY COLLABORATION:  RN/ PCT and PT/ PTA    EDUCATION:      TIME IN/OUT:  Time In: 0955  Time Out: 1020  Minutes: Vincenzo Ramos PTA

## 2022-09-13 LAB
GLUCOSE BLD STRIP.AUTO-MCNC: 118 MG/DL (ref 65–100)
GLUCOSE BLD STRIP.AUTO-MCNC: 119 MG/DL (ref 65–100)
GLUCOSE BLD STRIP.AUTO-MCNC: 128 MG/DL (ref 65–100)
GLUCOSE BLD STRIP.AUTO-MCNC: 137 MG/DL (ref 65–100)
SERVICE CMNT-IMP: ABNORMAL

## 2022-09-13 PROCEDURE — 1100000000 HC RM PRIVATE

## 2022-09-13 PROCEDURE — 82962 GLUCOSE BLOOD TEST: CPT

## 2022-09-13 PROCEDURE — 6370000000 HC RX 637 (ALT 250 FOR IP): Performed by: INTERNAL MEDICINE

## 2022-09-13 PROCEDURE — 97112 NEUROMUSCULAR REEDUCATION: CPT

## 2022-09-13 PROCEDURE — 2580000003 HC RX 258: Performed by: INTERNAL MEDICINE

## 2022-09-13 PROCEDURE — 94640 AIRWAY INHALATION TREATMENT: CPT

## 2022-09-13 PROCEDURE — 97530 THERAPEUTIC ACTIVITIES: CPT

## 2022-09-13 PROCEDURE — 94760 N-INVAS EAR/PLS OXIMETRY 1: CPT

## 2022-09-13 RX ADMIN — MONTELUKAST 10 MG: 10 TABLET, FILM COATED ORAL at 19:53

## 2022-09-13 RX ADMIN — SODIUM CHLORIDE, PRESERVATIVE FREE 10 ML: 5 INJECTION INTRAVENOUS at 08:27

## 2022-09-13 RX ADMIN — SODIUM CHLORIDE, PRESERVATIVE FREE 10 ML: 5 INJECTION INTRAVENOUS at 19:54

## 2022-09-13 RX ADMIN — SENNOSIDES AND DOCUSATE SODIUM 2 TABLET: 8.6; 5 TABLET ORAL at 08:24

## 2022-09-13 RX ADMIN — ANTI-FUNGAL POWDER MICONAZOLE NITRATE TALC FREE: 1.42 POWDER TOPICAL at 19:56

## 2022-09-13 RX ADMIN — ALBUTEROL SULFATE 2 PUFF: 90 AEROSOL, METERED RESPIRATORY (INHALATION) at 19:54

## 2022-09-13 RX ADMIN — CARVEDILOL 6.25 MG: 3.12 TABLET, FILM COATED ORAL at 17:10

## 2022-09-13 RX ADMIN — OXYCODONE 5 MG: 5 TABLET ORAL at 19:52

## 2022-09-13 RX ADMIN — FERROUS GLUCONATE 324 MG: 324 TABLET ORAL at 08:27

## 2022-09-13 RX ADMIN — CALCITONIN SALMON 1 SPRAY: 200 SPRAY, METERED NASAL at 08:27

## 2022-09-13 RX ADMIN — PANTOPRAZOLE SODIUM 40 MG: 40 TABLET, DELAYED RELEASE ORAL at 05:47

## 2022-09-13 RX ADMIN — GUAIFENESIN 1200 MG: 600 TABLET ORAL at 08:26

## 2022-09-13 RX ADMIN — Medication 6 MG: at 19:53

## 2022-09-13 RX ADMIN — NAPROXEN 500 MG: 250 TABLET ORAL at 08:25

## 2022-09-13 RX ADMIN — ACETAMINOPHEN 650 MG: 325 TABLET ORAL at 15:16

## 2022-09-13 RX ADMIN — NAPROXEN 500 MG: 250 TABLET ORAL at 17:11

## 2022-09-13 RX ADMIN — AMIODARONE HYDROCHLORIDE 200 MG: 200 TABLET ORAL at 08:25

## 2022-09-13 RX ADMIN — PANTOPRAZOLE SODIUM 40 MG: 40 TABLET, DELAYED RELEASE ORAL at 15:18

## 2022-09-13 RX ADMIN — ACETAMINOPHEN 650 MG: 325 TABLET ORAL at 08:25

## 2022-09-13 RX ADMIN — GABAPENTIN 300 MG: 300 CAPSULE ORAL at 08:26

## 2022-09-13 RX ADMIN — ANTI-FUNGAL POWDER MICONAZOLE NITRATE TALC FREE: 1.42 POWDER TOPICAL at 08:25

## 2022-09-13 RX ADMIN — GLIPIZIDE 5 MG: 5 TABLET ORAL at 08:26

## 2022-09-13 RX ADMIN — ACETAMINOPHEN 650 MG: 325 TABLET ORAL at 19:53

## 2022-09-13 RX ADMIN — ALBUTEROL SULFATE 2 PUFF: 90 AEROSOL, METERED RESPIRATORY (INHALATION) at 07:45

## 2022-09-13 RX ADMIN — GABAPENTIN 300 MG: 300 CAPSULE ORAL at 19:53

## 2022-09-13 RX ADMIN — TRAZODONE HYDROCHLORIDE 25 MG: 50 TABLET ORAL at 19:53

## 2022-09-13 RX ADMIN — GABAPENTIN 300 MG: 300 CAPSULE ORAL at 15:16

## 2022-09-13 RX ADMIN — GUAIFENESIN 1200 MG: 600 TABLET ORAL at 19:53

## 2022-09-13 RX ADMIN — METFORMIN HYDROCHLORIDE 500 MG: 500 TABLET ORAL at 17:11

## 2022-09-13 RX ADMIN — ATORVASTATIN CALCIUM 20 MG: 20 TABLET, FILM COATED ORAL at 19:53

## 2022-09-13 RX ADMIN — CARVEDILOL 6.25 MG: 3.12 TABLET, FILM COATED ORAL at 08:25

## 2022-09-13 RX ADMIN — FUROSEMIDE 40 MG: 40 TABLET ORAL at 08:27

## 2022-09-13 RX ADMIN — FLUTICASONE PROPIONATE 1 SPRAY: 50 SPRAY, METERED NASAL at 08:25

## 2022-09-13 RX ADMIN — INSULIN GLARGINE 14 UNITS: 100 INJECTION, SOLUTION SUBCUTANEOUS at 20:55

## 2022-09-13 RX ADMIN — METFORMIN HYDROCHLORIDE 500 MG: 500 TABLET ORAL at 08:26

## 2022-09-13 RX ADMIN — LATANOPROST 1 DROP: 50 SOLUTION OPHTHALMIC at 19:57

## 2022-09-13 ASSESSMENT — PAIN SCALES - GENERAL
PAINLEVEL_OUTOF10: 7
PAINLEVEL_OUTOF10: 0

## 2022-09-13 ASSESSMENT — PAIN DESCRIPTION - ORIENTATION: ORIENTATION: RIGHT;LEFT

## 2022-09-13 ASSESSMENT — PAIN DESCRIPTION - DESCRIPTORS: DESCRIPTORS: SPASM

## 2022-09-13 ASSESSMENT — PAIN DESCRIPTION - LOCATION: LOCATION: HIP;LEG

## 2022-09-13 NOTE — PROGRESS NOTES
Hospitalist Progress Note   Admit Date:  2022  7:22 PM   Name:  Prashanth Chou   Age:  65 y.o. Sex:  male  :  1945   MRN:  [id number]   Room:  807/    Presenting Complaint: No chief complaint on file. Reason(s) for Admission: Lumbar discitis [facility] Course: Prashanth Chou is a 65 y.o. male with a past medical history significant for ischemic cardiomyopathy with ICD/pacemaker, EF of 30 to 35%, A. fib on Eliquis, diabetes, obesity, peripheral artery disease anemia, COPD with asthma, coronary artery disease, obstructive sleep apnea on CPAP with recent L2 L4 kyphoplasty who was recently charged from [facility] 2022 to [facility] after being admitted for UTI worsening weakness status post surgery with several falls. MRI of the lumbar spine and cervical spine were done and he was found to have a lumbar spine epidural abscess and symptoms abscess. His CRP was elevated at 12.4. He had noted osteomyelitis of the lower spine. His oral anticoagulation was held and he was placed on a heparin drip. He was also placed on vancomycin and ceftriaxone after IR drain abscess. He was followed by infectious disease and treated for 6 weeks with Vanco and Rocephin. He developed altered mental status. This was thought to be due to metabolic cause. Unfortunately he started having increasing complaints of back pain confusion. He was transferred to [place] [address] for ongoing therapy and antibiotic needs. Cardiology was consulted and his ICD was interrogated and found to be functioning well. A Tomas was placed due to his urinary retention. Urology was consulted. He had a complete altered mental status work-up and it was thought he had acute encephalopathy secondary to especially opiates and antipsychotics. He has been weaning off Zyprexa with plans to continue IV antibiotics for total of 6 weeks with estimated end of therapy 2022.   He was transferred to inpatient rehab floor on August 26, 2022. He was continued on antibiotics he continued to have lower back pain and has been poorly participative in therapy over the last few days. MRI of the spine done today showed an apparent worsening osteomyelitis and psoas muscle abscess. It also showed compression of the cauda equina nerve roots at multiple levels in the lumbar spine as before. Multilevel degenerative disc disease and facet arthropathy. Bilateral psoas muscle myositis with new left psoas muscle abscess at the level of L3-L4. Because of the abscesses ID was sultan. And recommended stopping all antibiotics at this time and getting IR to aspirate the abscess for culture likely on Monday of next week. Dr. Arron Israel was also consulted and felt no surgical role indicated at this time and that his degenerative stenosis can be addressed once he is infection free. As the patient is no longer able to participate in therapy it was thought best to transfer him back to the hospital.         Subjective & 24hr Events (09/13/22): Patient seen at bedside, resting in bed comfortably. He is alert and awake, denies any worsening back pain, chest pain, abdominal pain, nausea vomiting, fever or chills. Discussed about potential plan for IR guided drainage to be attempted in next 2 to 3 days. Eliquis and Plavix is currently on hold. Patient denies any bladder or bowel incontinence, numbness tingling or weakness in lower extremities. Constipation has been resolved. ROS:  10 point review of system is negative except for what mentioned above. Assessment & Plan:     Principal Problem:  Lumbar spine epidural abscess, psoas abscess, osteomyelitis of the lower spine. Complicated wound infection  9/13-  Initially the plan was to continue Rocephin and vancomycin till 9/9/2022.   As per IDs recommendation, patient will be on antibiotic holiday IR consulted for abscess drainage for 9/12/22  Pain is adequately controlled with as needed Dilaudid, oxycodone and tramadol. Plan for IR guided drainage in next 2 to 3 days, Eliquis and Plavix held from 9/12. Also discussed case with Dr. Elidia Proctor, mentioned that patient does not have a clear abscess that is seen on MRI but may have a phlegmon. Case discussed with Dr. Nate Alcantar, ID, plan to pursue IR guided drainage as per IR schedule. Active Problems:       General weakness    Back pain  Multifactorial secondary to abscess and osteomyelitis that worsened  Will continue supportive care  Continue pain management         Anemia  Hemoglobin 8.4        Ischemic cardiomyopathy   ICD (implantable cardioverter-defibrillator) in place    Atrial fibrillation (HCC)  HTN (hypertension)  BP is optimally controlled with Coreg. Pulmonary emphysema (HCC)  COPD (chronic obstructive pulmonary disease) ([facility] [address]   Obstructive sleep apnea  Patient is currently on room air. Continue ProAir HFA, Mucinex and Dulera. Nicotine patch TD. Cigarette nicotine dependence in remission  Chewing tobacco dependence  Patient encouraged to stop chewing tobacco.  Cessation advised. Started on nicotine patch         Diabetic neuropathy (HCC)  Chronic pain of right knee       Continue gabapentin  Continue pain meds           PAD (peripheral artery disease) ([facility])  Plavix currently on hold. Continue Lipitor. Hyperlipidemia  Continue Lipitor      DM (diabetes mellitus) type II, controlled, with peripheral vascular disorder ([facility] [address]  9/13/2022  Blood glucose is optimally controlled, continue glipizide 5 mg p.o. daily, metformin 500 mg p.o. twice daily, Lantus 40 units subcu nightly along with sliding scale. MINI (acute kidney injury) ([facility] [address]  9/13/2022  Resolved  Avoid nephrotoxins  Dose medications for his renal function     Leg cramping and muscle spasms  Flexeril.          Anticipated discharge needs:    Pending clinical course    I spent 37 minutes of time caring for this patient on the unit nearby or at bedside, and more than 50 percent was spent on coordination of care activities, and/or patient/family counseling regarding status and plan of care. Diet:  ADULT DIET; Regular; 3 carb choices (45 gm/meal)  ADULT ORAL NUTRITION SUPPLEMENT; Breakfast, Dinner, Lunch; Other Oral Supplement; Chocolate only  DVT PPx: Eliquis  Code status: Full Code    Hospital Problems:  Principal Problem:    Lumbar discitis  Active Problems:    ICD (implantable cardioverter-defibrillator) in place    Physical debility    Psoas muscle abscess (HCC)    Atrial fibrillation (HCC)    HTN (hypertension)    Pulmonary emphysema (HCC)    Severe obesity (BMI 35.0-39. 9) with comorbidity ([facility] [address]    Diabetic neuropathy ([facility] [address]    Sleep apnea    Ventricular tachycardia (HCC)    PAD (peripheral artery disease) ([facility] [address]  Resolved Problems:    * No resolved hospital problems. *      Objective:   Patient Vitals for the past 24 hrs:   Temp Pulse Resp BP SpO2   09/13/22 0736 97.2 °F (36.2 °C) 60 18 130/64 93 %   09/13/22 0337 97.9 °F (36.6 °C) 63 18 135/70 90 %   09/12/22 2350 97.7 °F (36.5 °C) 61 18 (!) 141/75 94 %   09/12/22 2332 -- -- -- -- 95 %   09/12/22 1941 -- 63 18 -- 93 %   09/12/22 1937 97.7 °F (36.5 °C) 63 18 (!) 148/79 90 %   09/12/22 1540 97.9 °F (36.6 °C) 71 18 135/75 91 %   09/12/22 1058 98.4 °F (36.9 °C) 66 18 (!) 124/57 92 %   09/12/22 0747 -- 66 18 -- 94 %         Oxygen Therapy  SpO2: 93 %  Pulse Oximeter Device Mode: Intermittent  Pulse Oximeter Device Location: Right, Finger  O2 Device: PAP (positive airway pressure)  Skin Assessment: Clean, dry, & intact  FiO2 : 28 %  O2 Flow Rate (L/min): 2 L/min  O2 Delivery Method: CPAP/Bi-PAP mask    Estimated body mass index is 31.97 kg/m² as calculated from the following:    Height as of this encounter: 6' 1\" (1.854 m). Weight as of this encounter: 242 lb 4.8 oz (109.9 kg).     Intake/Output Summary (Last 24 hours) at 9/13/2022 0740  Last data filed at 9/13/2022 0013  Gross per 24 hour   Intake 236 ml   Output 700 ml   Net -464 ml           Physical Exam:     Blood pressure 130/64, pulse 60, temperature 97.2 °F (36.2 °C), temperature source Oral, resp. rate 18, height 6' 1\" (1.854 m), weight 242 lb 4.8 oz (109.9 kg), SpO2 93 %. General:    Alert, awake, NAD, on room air  HEENT:          Head NCAT, PERRLA positive, MMM  CV:   RRR. No m/r/g. No jugular venous distension. Lungs:   CTAB. No wheezing, rhonchi, or rales. Symmetric expansion. Abdomen: Bowel sounds present. Soft, nontender, nondistended. Extremities: No cyanosis or clubbing. No edema  Skin:     No rashes and normal coloration. Warm and dry.     Neuro:  GCS 15, cranial nerves intact, no motor deficit, mild sensory loss in bilateral lower extremity  Psych:  AOx3, mood and affect appropriate  Point tenderness in lower back appreciated    I have personally reviewed labs and tests showing:  Recent Labs:  Recent Results (from the past 48 hour(s))   POCT Glucose    Collection Time: 09/11/22  7:59 AM   Result Value Ref Range    POC Glucose 122 (H) 65 - 100 mg/dL    Performed by: Wangdaizhijiaitlin    POCT Glucose    Collection Time: 09/11/22 12:12 PM   Result Value Ref Range    POC Glucose 118 (H) 65 - 100 mg/dL    Performed by: Wangda[employer]hijiaitlin    POCT Glucose    Collection Time: 09/11/22  4:25 PM   Result Value Ref Range    POC Glucose 162 (H) 65 - 100 mg/dL    Performed by: [employer]    POCT Glucose    Collection Time: 09/11/22  8:49 PM   Result Value Ref Range    POC Glucose 186 (H) 65 - 100 mg/dL    Performed by: Claudia Ouch    POCT Glucose    Collection Time: 09/12/22  7:35 AM   Result Value Ref Range    POC Glucose 104 (H) 65 - 100 mg/dL    Performed by: [employer]    POCT Glucose    Collection Time: 09/12/22 10:59 AM   Result Value Ref Range    POC Glucose 168 (H) 65 - 100 mg/dL    Performed by: [employer]    POCT Glucose    Collection Time: 09/12/22  4:21 PM   Result mg Oral BID WC    pantoprazole (PROTONIX) tablet 40 mg  40 mg Oral BID AC    polyethylene glycol (GLYCOLAX) packet 17 g  17 g Oral Daily    sennosides-docusate sodium (SENOKOT-S) 8.6-50 MG tablet 2 tablet  2 tablet Oral Daily    sodium chloride flush 0.9 % injection 10 mL  10 mL IntraVENous BID    amiodarone (CORDARONE) tablet 200 mg  200 mg Oral Daily    traZODone (DESYREL) tablet 25 mg  25 mg Oral Nightly    albuterol sulfate HFA (PROVENTIL;VENTOLIN;PROAIR) 108 (90 Base) MCG/ACT inhaler 2 puff  2 puff Inhalation Q6H PRN    bisacodyl (DULCOLAX) suppository 10 mg  10 mg Rectal Daily PRN    dextrose 10 % infusion   IntraVENous Continuous PRN    dextrose bolus 10% 125 mL  125 mL IntraVENous PRN    Or    dextrose bolus 10% 250 mL  250 mL IntraVENous PRN    glucagon (rDNA) injection 1 mg  1 mg SubCUTAneous PRN    glucose chewable tablet 16 g  4 tablet Oral PRN    ondansetron (ZOFRAN-ODT) disintegrating tablet 4 mg  4 mg Oral Q6H PRN    oxyCODONE (ROXICODONE) immediate release tablet 5 mg  5 mg Oral Q4H PRN    [Held by provider] apixaban (ELIQUIS) tablet 5 mg  5 mg Oral BID    sodium chloride flush 0.9 % injection 10 mL  10 mL IntraVENous PRN    traMADol (ULTRAM) tablet 50 mg  50 mg Oral Q6H PRN    atorvastatin (LIPITOR) tablet 20 mg  20 mg Oral Nightly    calcitonin (MIACALCIN) nasal spray 1 spray  1 spray Alternating Nares Daily    carvedilol (COREG) tablet 6.25 mg  6.25 mg Oral BID WC    [Held by provider] clopidogrel (PLAVIX) tablet 75 mg  75 mg Oral Daily    ferrous gluconate (FERGON) tablet 324 mg  324 mg Oral Daily with breakfast       Signed:  Lissett Cornelius MD    Part of this note may have been written by using a voice dictation software. The note has been proof read but may still contain some grammatical/other typographical errors.

## 2022-09-13 NOTE — PROGRESS NOTES
Infectious Disease Progress Note      Today's Date: 9/13/2022   Admit Date: 9/7/2022    Impression:   L2-4 Discitis, culture negative: IV Vanc/ceftriaxone x 39 days; per discussion with Dr. Iris Paz patient with multiple other reasons for continued pain. He has agreed to pursue aspirate of disc in 24-72 hours off anticoagulation. If disc aspirate negative would recommend no further antibiotic treatment. Plan:     Aspiration later this week  Would NOT start empiric antibiotics but hold for results  If aspirate negative would not recommend any further antibiotics   Will follow up  Long discussion with patient and wife regarding ID recommendations above    Anti-infectives:   Vancomycin (07/31-09/07)  Ceftriaxone (07/31-09/07)    Subjective: Interval Events:  Continued back pain; reports no changes (no worsening) since antibiotics discontinued one week ago. Patient is a 65 y.o. male seen by ID in consult from 07/30-08/05 when he presented with back pain and weakness on 07/27 following L2-L4 kyphoplasty on 07/20/22. MRI done revealed osteomyelitis. BC and aspirate were both negative and patient treated empirically with IV Vancomycin and Ceftriaxone for 42 days with EOT 09/09/22. He was transferred to [facility] on 08/08 and continued antibiotics; per patient and wife he was walking with assistance. He was transferred to rehab on 08/26/22. Around that time he developed a sharp hip pain radiating from his left to right hip and gradually was became unable to walk. A repeat MRI done 09/06 revealed worsening finding of osteo at L2-L4 and bilateral psoas abscess with new left psoas abscess at L2-3 level. Diana Bateman Per wife at bedside, they do not want Dr. Kayla Fernadnez to operate anymore. Last APRs: Sed rate 36 ; CRP 3.0 on 08/19.       Allergies   Allergen Reactions    Azithromycin Hives    Oxaprozin Other (See Comments)     ELEVATED BLOOD PRESSURE        Review of Systems:  Comprehensive ROS negative other than mentioned in 09/12/22  8:28 PM   Result Value Ref Range    POC Glucose 88 65 - 100 mg/dL    Performed by: Vicky    POCT Glucose    Collection Time: 09/13/22  7:37 AM   Result Value Ref Range    POC Glucose 128 (H) 65 - 100 mg/dL    Performed by: Annelise         Microbiology:    BC x 2 07/30 negative  Disc aspirate 08/01: negative; gram stain with 20-50 WBC/HPF/ no organisms seen  MRSA nasal swab 03/08/22 negative  Studies:    MRI Lumbar Spine (09/06/22): Impression       1. Worsening imaging findings of osteomyelitis discitis at L2-L4. 2. Slight improvement in persistent ventral epidural enhancing phlegmon versus   small abscess at the level of L2-L3. This superimposed on the preexisting   degenerative changes results in unchanged severe spinal canal narrowing. 3. Bilateral psoas muscle myositis with a new left psoas muscle abscess of the   level of L3-L4 and decrease but persistent left-sided abscess at the level of   L2-L3.            Signed By: Darlyn Joseph MD     September 13, 2022

## 2022-09-13 NOTE — WOUND CARE
Patient seen per request for buttock skin breakdown. Noted red, blanchable and slight denuded skin from MASD. Silicone foam in use, add zinc paste to skin. Discussed with patient and his wife. Answered all questions. Recommend leaving brief off when in the bed, use just bed pad to absorb. Also recommend turning if patient can tolerate. Will continue to monitor and adjust treatment plan as needed.

## 2022-09-13 NOTE — PROGRESS NOTES
PHYSICAL THERAPY: Daily Note AM   (Link to Caseload Tracking: PT Visit Days : 5  Time In/Out PT Charge Capture  Rehab Caseload Tracker  Orders    Rudy Cosby is a 65 y.o. male   PRIMARY DIAGNOSIS: Lumbar discitis  Lumbar discitis [M46.46]       Inpatient: Payor: MEDICARE / Plan: MEDICARE PART A AND B / Product Type: *No Product type* /     ASSESSMENT:     REHAB RECOMMENDATIONS:   Recommendation to date pending progress:  Setting:  Short-term Rehab    Equipment:    To Be Determined     ASSESSMENT:  Mr. Paulino Venegas is doing well today. His supine to sit is improved. He went from mod of 2 to min of 2. Once sitting he doest tolerate a lot of activity and as he fatigues his sitting balance requires more and more assist  he always ends up leaning to the right. Mod assist for rolling and rn asked us to place wedge to off load buttocks. All in all a good session but still does not tolerate much.   Cotx with OT needed still      SUBJECTIVE:   Mr. Paulino Venegas states, \"I guess Im ok\"    Social/Functional Lives With: Spouse  Type of Home: House  Home Layout: Two level  Home Access: Stairs to enter with rails  Entrance Stairs - Number of Steps: 4  Entrance Stairs - Rails: Left  Bathroom Shower/Tub: Walk-in shower  Bathroom Toilet: Handicap height  Bathroom Equipment: Grab bars in shower, Toilet raiser  Bathroom Accessibility: Accessible  Home Equipment: [address], [place], [address], [place], [address] Help From: Family  ADL Assistance: Independent  Homemaking Assistance: Independent  Homemaking Responsibilities: No  Ambulation Assistance: Independent  Transfer Assistance: Independent  Active : Yes  Mode of Transportation: Car  Occupation: Retired  OBJECTIVE:     PAIN: VITALS / O2: PRECAUTION / [place] [address]:   Pre Treatment:          Post Treatment: 4 Vitals        Oxygen    None    RESTRICTIONS/PRECAUTIONS:        MOBILITY: I Mod I S SBA CGA Min Mod Max Total  NT x2 Comments:   Bed Mobility    Rolling [] [] [] [] [] [x] [x] [] [] [] []    Supine to Sit [] [] [] [] [] [x] [] [] [] [] [x]    Scooting [] [] [] [] [] [] [x] [] [] [] []    Sit to Supine [] [] [] [] [] [] [x] [] [] [] [x]    Transfers    Sit to Stand [] [] [] [] [] [] [] [] [] [] []    Bed to Chair [] [] [] [] [] [] [] [] [] [] []    Stand to Sit [] [] [] [] [] [] [] [] [] [] []     [] [] [] [] [] [] [] [] [] [] []    I=Independent, Mod I=Modified Independent, S=Supervision, SBA=Standby Assistance, CGA=Contact Guard Assistance,   Min=Minimal Assistance, Mod=Moderate Assistance, Max=Maximal Assistance, Total=Total Assistance, NT=Not Tested    BALANCE: Good Fair+ Fair Fair- Poor NT Comments   Sitting Static [] [] [x] [x] [] []    Sitting Dynamic [] [] [] [x] [x] []              Standing Static [] [] [] [] [] []    Standing Dynamic [] [] [] [] [] []      GAIT: I Mod I S SBA CGA Min Mod Max Total  NT x2 Comments:   Level of Assistance [] [] [] [] [] [] [] [] [] [] []    Distance   feet    DME N/A    Gait Quality N/A    Weightbearing Status      Stairs      I=Independent, Mod I=Modified Independent, S=Supervision, SBA=Standby Assistance, CGA=Contact Guard Assistance,   Min=Minimal Assistance, Mod=Moderate Assistance, Max=Maximal Assistance, Total=Total Assistance, NT=Not Tested    PLAN:   ACUTE PHYSICAL THERAPY GOALS:   (Developed with and agreed upon by patient and/or caregiver.)     (1.) Aleena Lauraer  will move from supine to sit and sit to supine  with CONTACT GUARD ASSIST within 7 treatment day(s). (2.) Aleena Lauraer will transfer from bed to chair and chair to bed with MINIMAL ASSIST using the least restrictive device within 7 treatment day(s). (3.) Aleena Lauraer will ambulate with MINIMAL ASSIST for 50 feet with the least restrictive device within 7 treatment day(s). (4.) Aleena Lauraer will perform standing static and dynamic balance activities x 10 minutes with CONTACT GUARD ASSIST to improve safety within 7 treatment day(s).   (5.) Diane Sandoval Edvin Sifuentes will perform bilateral lower extremity exercises x 20 min for HEP with INDEPENDENCE to improve strength, endurance, and functional mobility within 7 treatment day(s). FREQUENCY AND DURATION: Daily for duration of hospital stay or until stated goals are met, whichever comes first.    TREATMENT:   TREATMENT:   Co-Treatment PT/OT necessary due to patient's decreased overall endurance/tolerance levels, as well as need for high level skilled assistance to complete functional transfers/mobility and functional tasks  Therapeutic Activity (10 Minutes): Therapeutic activity included Rolling, Supine to Sit, Sit to Supine, Scooting, Lateral Scooting, and Sitting balance  to improve functional Activity tolerance, Balance, Coordination, Mobility, and Strength. Therapeutic Exercise (20 Minutes): Therapeutic exercises noted below to improve functional activity tolerance, AROM, strength, and mobility.      TREATMENT GRID:   Date:   Date:   Date:     Activity/Exercise Parameters Parameters Parameters   Seated TKE X 10     Seated marching      Seated arm raises      Seated punching      Supine AP X 10     Heel slides      SAQ      Supine hip abd      Seated row (pulling me toward him)          AFTER TREATMENT PRECAUTIONS: Bed, Bed/Chair Locked, Call light within reach, Needs within reach, and RN notified    INTERDISCIPLINARY COLLABORATION:  RN/ PCT, PT/ PTA, and OT/ KATE    EDUCATION:      TIME IN/OUT:  Time In: 1310  Time Out: James  Minutes: [address], PT

## 2022-09-13 NOTE — PROGRESS NOTES
OCCUPATIONAL THERAPY: Daily Note AM   OT Visit Days: 4   Time  OT Charge Capture  Rehab Caseload Tracker  OT Orders    Elisa Briceno is a 65 y.o. male   PRIMARY DIAGNOSIS: Lumbar discitis  Lumbar discitis [M46.46]       Inpatient: Payor: MEDICARE / Plan: MEDICARE PART A AND B / Product Type: *No Product type* /     ASSESSMENT:     REHAB RECOMMENDATIONS: CURRENT LEVEL OF FUNCTION:  (Most Recently Demonstrated)   Recommendation to date pending progress:  Setting:  Short-term Rehab    Equipment:    To Be Determined Bathing:  Not Tested  Dressing:  Not Tested  Feeding/Grooming:  Not Tested  Toileting:  Not Tested   Functional Mobility:  Minimal Assist-max A X 2- bed mob. ASSESSMENT:  AM-  Mr. Edvin Sifuentes was supine in bed upon arrival. Pt completed supine to sit with min A X 2 (rolling technique). Pt sat EOB and completed the exercises on B UE's and LE's. Pt fatigues quickly. Pt continues to require assistance to maintain sitting balance. Pt required max A X 2 for sit to supine. Continue POC.           SUBJECTIVE:     Mr. Edvin Sifuentes states, \"Hey\"     Social/Functional Lives With: Spouse  Type of Home: House  Home Layout: Two level  Home Access: Stairs to enter with rails  Entrance Stairs - Number of Steps: 4  Entrance Stairs - Rails: Left  Bathroom Shower/Tub: Walk-in shower  Bathroom Toilet: Handicap height  Bathroom Equipment: Grab bars in shower, Toilet raiser  Bathroom Accessibility: Accessible  Home Equipment: Hardik Adams, [address] Help From: Family  ADL Assistance: Independent  Homemaking Assistance: Independent  Homemaking Responsibilities: No  Ambulation Assistance: Independent  Transfer Assistance: Independent  Active : Yes  Mode of Transportation: Car  Occupation: Retired    OBJECTIVE:     Maddy Blum / Gracie Merck / AIRWAY: IV    RESTRICTIONS/PRECAUTIONS:           PAIN: VITALS / O2:   Pre Treatment: 4             Post Treatment: 4 Vitals          Oxygen            MOBILITY: I Mod I S SBA CGA Min Mod Max Total  NT x2 Comments:   Bed Mobility    Rolling [] [] [] [] [] [] [] [] [] [] []    Supine to Sit [] [] [] [] [] [x] [] [] [] [] [x]    Scooting [] [] [] [] [] [] [] [] [] [] []    Sit to Supine [] [] [] [] [] [] [] [x] [] [] [x]    Transfers    Sit to Stand [] [] [] [] [] [] [] [] [] [] []    Bed to Chair [] [] [] [] [] [] [] [] [] [] []    Stand to Sit [] [] [] [] [] [] [] [] [] [] []    Tub/Shower [] [] [] [] [] [] [] [] [] [] []     Toilet [] [] [] [] [] [] [] [] [] [] []      [] [] [] [] [] [] [] [] [] [] []    I=Independent, Mod I=Modified Independent, S=Supervision/Setup, SBA=Standby Assistance, CGA=Contact Guard Assistance, Min=Minimal Assistance, Mod=Moderate Assistance, Max=Maximal Assistance, Total=Total Assistance, NT=Not Tested    ACTIVITIES OF DAILY LIVING: I Mod I S SBA CGA Min Mod Max Total NT Comments   BASIC ADLs:              Upper Body   Bathing [] [] [] [] [] [] [] [] [] []    Lower Body Bathing [] [] [] [] [] [] [] [] [] []    Toileting [] [] [] [] [] [] [] [] []     Upper Body Dressing [] [] [] [] [] [] [] [] [] []    Lower Body Dressing [] [] [] [] [] [] [] [] [] []    Feeding [] [] [] [] [] [] [] [] [] []    Grooming [] [] [] [] [] [] [] [] [] []    Personal Device Care [] [] [] [] [] [] [] [] [] []    Functional Mobility [] [] [] [] [] [] [] [] [] []    I=Independent, Mod I=Modified Independent, S=Supervision/Setup, SBA=Standby Assistance, CGA=Contact Guard Assistance, Min=Minimal Assistance, Mod=Moderate Assistance, Max=Maximal Assistance, Total=Total Assistance, NT=Not Tested    BALANCE: Good Fair+ Fair Fair- Poor NT Comments   Sitting Static [] [] [] [x] [] []    Sitting Dynamic [] [] [] [x] [] []              Standing Static [] [] [] [] [] []    Standing Dynamic [] [] [] [] [] []        PLAN:     FREQUENCY/DURATION   OT Plan of Care: 5 times/week for duration of hospital stay or until stated goals are met, whichever comes first.    ACUTE OCCUPATIONAL THERAPY GOALS:   (Developed with and agreed upon by patient and/or caregiver.)    (1.) Carolina Petersen  will move from supine to sit and sit to supine  with CONTACT GUARD ASSIST within 7 treatment day(s). (2.) Carolina Petersen will transfer from bed to chair and chair to bed with MINIMAL ASSIST using the least restrictive device within 7 treatment day(s). (3.) Carolina Petersen will ambulate with MINIMAL ASSIST for 50 feet with the least restrictive device within 7 treatment day(s). (4.) Carolina Petersen will perform standing static and dynamic balance activities x 10 minutes with CONTACT GUARD ASSIST to improve safety within 7 treatment day(s). (5.) Carolina Petersen will perform bilateral lower extremity exercises x 20 min for HEP with INDEPENDENCE to improve strength, endurance, and functional mobility within 7 treatment day(s). TREATMENT:     TREATMENT:   AM-  Neuromuscular Re-education (25 Minutes): Neuromuscular Re-education included Balance Training, Coordination training, Postural training, and Sitting balance training to improve Balance, Coordination, and Postural Control.     TREATMENT GRID:  N/A    AFTER TREATMENT PRECAUTIONS: Alarm Activated, Bed, Bed/Chair Locked, Call light within reach, Needs within reach, RN notified, and Side rails x3    INTERDISCIPLINARY COLLABORATION:  RN/ PCT, PT/ PTA, and OT/ KATE    EDUCATION:       TOTAL TREATMENT DURATION AND TIME:    Time In: 1310  Time Out: Silvio Prather  Minutes: [address], COLETTE

## 2022-09-14 ENCOUNTER — APPOINTMENT (OUTPATIENT)
Dept: CT IMAGING | Age: 77
DRG: 094 | End: 2022-09-14
Attending: INTERNAL MEDICINE
Payer: MEDICARE

## 2022-09-14 LAB
GLUCOSE BLD STRIP.AUTO-MCNC: 100 MG/DL (ref 65–100)
GLUCOSE BLD STRIP.AUTO-MCNC: 105 MG/DL (ref 65–100)
GLUCOSE BLD STRIP.AUTO-MCNC: 108 MG/DL (ref 65–100)
GLUCOSE BLD STRIP.AUTO-MCNC: 91 MG/DL (ref 65–100)
SERVICE CMNT-IMP: ABNORMAL
SERVICE CMNT-IMP: ABNORMAL
SERVICE CMNT-IMP: NORMAL
SERVICE CMNT-IMP: NORMAL

## 2022-09-14 PROCEDURE — 2580000003 HC RX 258: Performed by: INTERNAL MEDICINE

## 2022-09-14 PROCEDURE — 94760 N-INVAS EAR/PLS OXIMETRY 1: CPT

## 2022-09-14 PROCEDURE — 2500000003 HC RX 250 WO HCPCS: Performed by: RADIOLOGY

## 2022-09-14 PROCEDURE — 87205 SMEAR GRAM STAIN: CPT

## 2022-09-14 PROCEDURE — 2709999900 CT GUIDED FNA

## 2022-09-14 PROCEDURE — 94640 AIRWAY INHALATION TREATMENT: CPT

## 2022-09-14 PROCEDURE — 0JB73ZX EXCISION OF BACK SUBCUTANEOUS TISSUE AND FASCIA, PERCUTANEOUS APPROACH, DIAGNOSTIC: ICD-10-PCS | Performed by: RADIOLOGY

## 2022-09-14 PROCEDURE — 6370000000 HC RX 637 (ALT 250 FOR IP): Performed by: INTERNAL MEDICINE

## 2022-09-14 PROCEDURE — 87102 FUNGUS ISOLATION CULTURE: CPT

## 2022-09-14 PROCEDURE — 0J973ZX DRAINAGE OF BACK SUBCUTANEOUS TISSUE AND FASCIA, PERCUTANEOUS APPROACH, DIAGNOSTIC: ICD-10-PCS | Performed by: RADIOLOGY

## 2022-09-14 PROCEDURE — 94664 DEMO&/EVAL PT USE INHALER: CPT

## 2022-09-14 PROCEDURE — 1100000000 HC RM PRIVATE

## 2022-09-14 PROCEDURE — 2700000000 HC OXYGEN THERAPY PER DAY

## 2022-09-14 PROCEDURE — 82962 GLUCOSE BLOOD TEST: CPT

## 2022-09-14 PROCEDURE — 98960 EDU&TRN PT SELF-MGMT NQHP 1: CPT

## 2022-09-14 PROCEDURE — 6360000002 HC RX W HCPCS: Performed by: RADIOLOGY

## 2022-09-14 RX ORDER — MIDAZOLAM HYDROCHLORIDE 1 MG/ML
INJECTION INTRAMUSCULAR; INTRAVENOUS
Status: COMPLETED | OUTPATIENT
Start: 2022-09-14 | End: 2022-09-14

## 2022-09-14 RX ORDER — LIDOCAINE HYDROCHLORIDE 20 MG/ML
INJECTION, SOLUTION INFILTRATION; PERINEURAL
Status: COMPLETED | OUTPATIENT
Start: 2022-09-14 | End: 2022-09-14

## 2022-09-14 RX ORDER — FENTANYL CITRATE 50 UG/ML
INJECTION, SOLUTION INTRAMUSCULAR; INTRAVENOUS
Status: COMPLETED | OUTPATIENT
Start: 2022-09-14 | End: 2022-09-14

## 2022-09-14 RX ADMIN — TRAMADOL HYDROCHLORIDE 50 MG: 50 TABLET ORAL at 21:36

## 2022-09-14 RX ADMIN — MONTELUKAST 10 MG: 10 TABLET, FILM COATED ORAL at 21:38

## 2022-09-14 RX ADMIN — GLIPIZIDE 5 MG: 5 TABLET ORAL at 12:35

## 2022-09-14 RX ADMIN — METFORMIN HYDROCHLORIDE 500 MG: 500 TABLET ORAL at 12:36

## 2022-09-14 RX ADMIN — METFORMIN HYDROCHLORIDE 500 MG: 500 TABLET ORAL at 17:32

## 2022-09-14 RX ADMIN — NAPROXEN 500 MG: 250 TABLET ORAL at 12:36

## 2022-09-14 RX ADMIN — SENNOSIDES AND DOCUSATE SODIUM 2 TABLET: 8.6; 5 TABLET ORAL at 12:35

## 2022-09-14 RX ADMIN — MIDAZOLAM 0.5 MG: 1 INJECTION INTRAMUSCULAR; INTRAVENOUS at 10:13

## 2022-09-14 RX ADMIN — INSULIN GLARGINE 14 UNITS: 100 INJECTION, SOLUTION SUBCUTANEOUS at 21:32

## 2022-09-14 RX ADMIN — FUROSEMIDE 40 MG: 40 TABLET ORAL at 12:34

## 2022-09-14 RX ADMIN — CALCITONIN SALMON 1 SPRAY: 200 SPRAY, METERED NASAL at 12:43

## 2022-09-14 RX ADMIN — GUAIFENESIN 1200 MG: 600 TABLET ORAL at 21:35

## 2022-09-14 RX ADMIN — ALBUTEROL SULFATE 2 PUFF: 90 AEROSOL, METERED RESPIRATORY (INHALATION) at 07:44

## 2022-09-14 RX ADMIN — CARVEDILOL 6.25 MG: 3.12 TABLET, FILM COATED ORAL at 12:36

## 2022-09-14 RX ADMIN — NAPROXEN 500 MG: 250 TABLET ORAL at 17:33

## 2022-09-14 RX ADMIN — PANTOPRAZOLE SODIUM 40 MG: 40 TABLET, DELAYED RELEASE ORAL at 17:33

## 2022-09-14 RX ADMIN — LIDOCAINE HYDROCHLORIDE 10 ML: 20 INJECTION, SOLUTION INFILTRATION; PERINEURAL at 10:15

## 2022-09-14 RX ADMIN — SODIUM CHLORIDE, PRESERVATIVE FREE 10 ML: 5 INJECTION INTRAVENOUS at 12:44

## 2022-09-14 RX ADMIN — CARVEDILOL 6.25 MG: 3.12 TABLET, FILM COATED ORAL at 17:33

## 2022-09-14 RX ADMIN — MIDAZOLAM 1 MG: 1 INJECTION INTRAMUSCULAR; INTRAVENOUS at 10:08

## 2022-09-14 RX ADMIN — TRAZODONE HYDROCHLORIDE 25 MG: 50 TABLET ORAL at 21:35

## 2022-09-14 RX ADMIN — ACETAMINOPHEN 650 MG: 325 TABLET ORAL at 21:35

## 2022-09-14 RX ADMIN — Medication 6 MG: at 21:35

## 2022-09-14 RX ADMIN — AMIODARONE HYDROCHLORIDE 200 MG: 200 TABLET ORAL at 12:37

## 2022-09-14 RX ADMIN — FENTANYL CITRATE 25 MCG: 50 INJECTION, SOLUTION INTRAMUSCULAR; INTRAVENOUS at 10:13

## 2022-09-14 RX ADMIN — PANTOPRAZOLE SODIUM 40 MG: 40 TABLET, DELAYED RELEASE ORAL at 05:54

## 2022-09-14 RX ADMIN — FLUTICASONE PROPIONATE 1 SPRAY: 50 SPRAY, METERED NASAL at 12:37

## 2022-09-14 RX ADMIN — FENTANYL CITRATE 50 MCG: 50 INJECTION, SOLUTION INTRAMUSCULAR; INTRAVENOUS at 10:08

## 2022-09-14 RX ADMIN — ALBUTEROL SULFATE 2 PUFF: 90 AEROSOL, METERED RESPIRATORY (INHALATION) at 19:44

## 2022-09-14 RX ADMIN — FERROUS GLUCONATE 324 MG: 324 TABLET ORAL at 12:34

## 2022-09-14 RX ADMIN — ACETAMINOPHEN 650 MG: 325 TABLET ORAL at 12:33

## 2022-09-14 RX ADMIN — GABAPENTIN 300 MG: 300 CAPSULE ORAL at 21:35

## 2022-09-14 RX ADMIN — POLYETHYLENE GLYCOL 3350 17 G: 17 POWDER, FOR SOLUTION ORAL at 12:32

## 2022-09-14 RX ADMIN — LATANOPROST 1 DROP: 50 SOLUTION OPHTHALMIC at 21:41

## 2022-09-14 RX ADMIN — GABAPENTIN 300 MG: 300 CAPSULE ORAL at 12:36

## 2022-09-14 RX ADMIN — ANTI-FUNGAL POWDER MICONAZOLE NITRATE TALC FREE: 1.42 POWDER TOPICAL at 12:39

## 2022-09-14 RX ADMIN — GUAIFENESIN 1200 MG: 600 TABLET ORAL at 12:35

## 2022-09-14 RX ADMIN — ATORVASTATIN CALCIUM 20 MG: 20 TABLET, FILM COATED ORAL at 21:40

## 2022-09-14 RX ADMIN — SODIUM CHLORIDE, PRESERVATIVE FREE 10 ML: 5 INJECTION INTRAVENOUS at 21:40

## 2022-09-14 ASSESSMENT — PAIN SCALES - GENERAL
PAINLEVEL_OUTOF10: 0
PAINLEVEL_OUTOF10: 2
PAINLEVEL_OUTOF10: 6

## 2022-09-14 ASSESSMENT — PAIN DESCRIPTION - LOCATION
LOCATION: BACK
LOCATION: HIP

## 2022-09-14 ASSESSMENT — PAIN - FUNCTIONAL ASSESSMENT: PAIN_FUNCTIONAL_ASSESSMENT: 0-10

## 2022-09-14 ASSESSMENT — PAIN DESCRIPTION - DESCRIPTORS: DESCRIPTORS: ACHING;CRAMPING

## 2022-09-14 ASSESSMENT — PAIN DESCRIPTION - ORIENTATION: ORIENTATION: LOWER

## 2022-09-14 NOTE — PROGRESS NOTES
TRANSFER - OUT REPORT:           Verbal report given to Shena Garza RN(name) on Carolina Petersen  being transferred to IR Recovery 8(unit) for routine post-op              Report consisted of patients Situation, Background, Assessment and      Recommendations(SBAR). Information from the following report(s) SBAR, Procedure Summary, and MAR was reviewed with the receiving nurse. Opportunity for questions and clarification was provided. Conscious Sedation:    75 Mcg of Fentanyl administered   1.5 Mg of Versed administered       Pt tolerated procedure well.          Peripheral Intravenous Line:   Peripheral IV 18/35/63 Left Cephalic (Active)   Site Assessment Clean, dry & intact 09/14/22 0715   Line Status Capped 09/14/22 0715   Line Care Cap changed 09/11/22 0709   Phlebitis Assessment No symptoms 09/14/22 0325   Infiltration Assessment 0 09/14/22 0325   Alcohol Cap Used Yes 09/14/22 0325   Dressing Status Clean, dry & intact 09/14/22 0715   Dressing Type Transparent 09/14/22 0325   Dressing Intervention New 09/09/22 1830       VITALS:  /72   Pulse 65   Temp 98.1 °F (36.7 °C) (Temporal)   Resp 19   Ht 6' 1\" (1.854 m)   Wt 242 lb (109.8 kg)   SpO2 100%   BMI 31.93 kg/m²

## 2022-09-14 NOTE — PROGRESS NOTES
Comprehensive Nutrition Assessment    Type and Reason for Visit: [address] for Pressure Injury Stage 2 or greater    Nutrition Recommendations/Plan:   Meals and Snacks:  Diet: Continue current order. RD to reorder following IR procedure. Nutrition Supplement Therapy:  Medical food supplement therapy:  Continue Ensure Enlive three times per day (this provides 350 kcal and 20 grams protein per bottle)     Malnutrition Assessment:  Malnutrition Status: At risk for malnutrition (Comment) (progressive poor PO with wt loss)  Pt without joe signs of muscle or fat wasting appreciated    Nutrition Assessment:  Nutrition History: Patient known to RD from previous admission. He and spouse reported at that time progressive decline in PO since COVID infection last year. Then intake decline to bites after recent surgery. During admission patient was again eating very poorly even despite family bringing foods that he likes. He was drinking Ensure well. Wife states that since d/c to [facility] and then [facility] intake has somewhat improved. Bed weight last admission 259#. Weight at [facility] 9/3 237#. No joe wasting. Do You Have Any Cultural, Druze, or Ethnic Food Preferences?: No   Nutrition Background:   Patient with PMH significant for ICM/PPM, Afib, DM2, obesity, PAD, anemia, COPD, CAD, SHERI on CPAP, recentl L2-4 kyphoplasty. Recent admission with worsening back pain, lower extremity weakness, and falls. He d/c to [facility] 8/8 then to [facility] 8/26. He decline in participation with rehab and had AMS and was admitted back to [facility]. He was found to have worsening osteomyelitis and psoas muscle abscess. Pt s/p image guided aspiration and biopsy of vertebral body 9/14. No fluid to aspirate, bx performed. Wound Type:  (3x1.5 cm left heel ulcer per WC note 9/9)  Nutrition Interval:  Pt seen at bedside, no visitors present. Pt reports he feels intake/appetite are improving.  He states \"I eat 100% of the meal if I enjoy it.\" When RD asked how often pt enjoys meals being served, he states \"very seldom. \" Pt also states his wife brings in meals from outside, pt reports consuming a leone burger, fried mushrooms and apple strudel thus far today. Difficult to assess true intake as pt consumes majority of meals from outside. Pt states she always consumes ensure supplements served at meals, prefers chocolate flavor. Current Nutrition Therapies:  No diet orders on file    Current Intake:   Average Meal Intake: 26-50% (+ foods from outside) Average Supplements Intake: %      Anthropometric Measures:  Height: 6' 1\" (185.4 cm)  Current Body Wt: 242 lb 4.6 oz (109.9 kg) (9/10), Weight source: Bed Scale  BMI: 32, Obese Class 1 (BMI 30.0-34. 9)  Admission Body Weight: 242 lb 4.6 oz (109.9 kg)  Ideal Body Weight (Kg) (Calculated): 84 kg (184 lbs), 131.7 %  Usual Body Wt: 280 lb (127 kg) (1/6/22), Percent weight change: -13.5       Edema:Peripheral Vascular  Peripheral Vascular (WDL): Within Defined Limits   BMI Category Obese Class 1 (BMI 30.0-34. 9)  Estimated Daily Nutrient Needs:  Energy (kcal/day): 6838-3946 (Kcal/kg (15-20) Weight used: 109.9 kg Current  Protein (g/day):  (20% kcal) Weight Used: (Other (Comment))    Fluid (ml/day):   (1 ml/kcal)    Nutrition Diagnosis:   Inadequate oral intake related to  (waxing/waning appetite, taste preferences) as evidenced by  (pt reported barriers to intake as above)  Nutrition Interventions:   Food and/or Nutrient Delivery: Continue Current Diet, Continue Oral Nutrition Supplement     Coordination of Nutrition Care: Continue to monitor while inpatient       Goals:   Previous Goal Met: Goal(s) Achieved  Active Goal: Meet at least 75% of estimated needs, by next RD assessment       Nutrition Monitoring and Evaluation:      Food/Nutrient Intake Outcomes: Food and Nutrient Intake, Supplement Intake  Physical Signs/Symptoms Outcomes: Meal Time Behavior, Weight    Discharge Planning: Continue Oral Nutrition Supplement    Sliverio Gaffney RD

## 2022-09-14 NOTE — PROGRESS NOTES
Progress Note    Patient: Aleena Rosenthal MRN: [id number]  SSN: [id number]    YOB: 1945  Age: 65 y.o. Sex: male      Admit Date: 9/7/2022    LOS: 7 days     Assessment and Plan:   65 y.o. male with a past medical history significant for ischemic cardiomyopathy with ICD/pacemaker, EF of 30 to 35%, A. fib on Eliquis, diabetes, obesity, peripheral artery disease anemia, COPD with asthma, coronary artery disease, obstructive sleep apnea on CPAP with recent L2 L4 kyphoplasty who was recently discharged from [facility] August 8, 2022 to [facility] after being admitted for UTI worsening weakness status post surgery with several falls. MRI of the lumbar spine and cervical spine were done and he was found to have a lumbar spine epidural abscess and symptoms abscess    1. Lumbar spine and psoas abscess with lumbar spine osteomyelitis and complicated wound infection  -Initially on ceftriaxone and vancomycin until 9/9/2022  -As per ID recommendation holding antibiotics for now  -IR consulted for abscess drainage  -Symptomatic management  -Pain management  -Infectious disease and IR recommendations appreciated    2. Atrial fibrillation  -Currently rate controlled  -Continue Coreg  -Holding Eliquis in the setting of abscess drainage today    3. Peripheral artery disease  -Holding Plavix in the setting of possible abscess drainage today  -Continue statin    4. COPD  -Currently not on exacerbation  -Continue home inhalers  -Albuterol as needed    5.   Diabetes mellitus  -Basal insulin  -Insulin sliding scale  -Blood sugar checks before meals and at bedtime    DVT prophylaxis holding Eliquis for now    Subjective:   65 y.o. male with a past medical history significant for ischemic cardiomyopathy with ICD/pacemaker, EF of 30 to 35%, A. fib on Eliquis, diabetes, obesity, peripheral artery disease anemia, COPD with asthma, coronary artery disease, obstructive sleep apnea on CPAP with recent L2 L4 kyphoplasty who was recently disccharged from [facility] August 8, 2022 to [facility] after being admitted for UTI worsening weakness status post surgery with several falls. MRI of the lumbar spine and cervical spine were done and he was found to have a lumbar spine epidural abscess and symptoms abscess. His CRP was elevated at 12.4. He had noted osteomyelitis of the lower spine. His oral anticoagulation was held and he was placed on a heparin drip. He was also placed on vancomycin and ceftriaxone after IR drain abscess. He was followed by infectious disease and treated for 6 weeks with Vanco and Rocephin. He developed altered mental status. This was thought to be due to metabolic cause. Unfortunately he started having increasing complaints of back pain confusion. He was transferred to [facility] for ongoing therapy and antibiotic needs. Cardiology was consulted and his ICD was interrogated and found to be functioning well. A Tomas was placed due to his urinary retention. Urology was consulted. He had a complete altered mental status work-up and it was thought he had acute encephalopathy secondary to especially opiates and antipsychotics. He has been weaning off Zyprexa with plans to continue IV antibiotics for total of 6 weeks with estimated end of therapy 9/20/2022. He was transferred to inpatient rehab floor on August 26, 2022. He was continued on antibiotics he continued to have lower back pain and has been poorly participative in therapy over the last few days. MRI of the spine done today showed an apparent worsening osteomyelitis and psoas muscle abscess. It also showed compression of the cauda equina nerve roots at multiple levels in the lumbar spine as before. Multilevel degenerative disc disease and facet arthropathy. Bilateral psoas muscle myositis with new left psoas muscle abscess at the level of L3-L4.   Because of the abscesses ID was Performed by: Vivi    POCT Glucose    Collection Time: 09/14/22 12:15 PM   Result Value Ref Range    POC Glucose 105 (H) 65 - 100 mg/dL    Performed by: Lui Foley       @[email or web address]@     Image:  I have personally reviewed patients imaging showing  CT GUIDED FNA    (Results Pending)        Current Facility-Administered Medications   Medication Dose Route Frequency Provider Last Rate Last Admin    nicotine (NICODERM CQ) 21 MG/24HR 1 patch  1 patch TransDERmal Daily Eliana Thompson MD        albuterol sulfate HFA (PROVENTIL;VENTOLIN;PROAIR) 108 (90 Base) MCG/ACT inhaler 2 puff  2 puff Inhalation BID Eliana Thompson MD   2 puff at 09/14/22 0744    mometasone-formoterol (DULERA) 100-5 MCG/ACT inhaler 2 puff  2 puff Inhalation BID Eliana Thopmson MD   2 puff at 09/13/22 1955    HYDROmorphone HCl PF (DILAUDID) injection 0.25 mg  0.25 mg IntraVENous Q4H PRN Eliana Thompson MD   0.25 mg at 09/09/22 1114    HYDROmorphone HCl PF (DILAUDID) injection 0.5 mg  0.5 mg IntraVENous Q4H PRN Eliana Thompson MD   0.5 mg at 09/10/22 1540    acetaminophen (TYLENOL) tablet 650 mg  650 mg Oral TID Shazia Joyce MD   650 mg at 09/14/22 1233    fluticasone (FLONASE) 50 MCG/ACT nasal spray 1 spray  1 spray Each Nostril Daily Shazia Joyce MD   1 spray at 09/14/22 1237    furosemide (LASIX) tablet 40 mg  40 mg Oral Daily Shazia Joyce MD   40 mg at 09/14/22 1234    gabapentin (NEURONTIN) capsule 300 mg  300 mg Oral TID Shazia Joyce MD   300 mg at 09/14/22 1236    glipiZIDE (GLUCOTROL) tablet 5 mg  5 mg Oral QAM AC Shazia Joyce MD   5 mg at 09/14/22 1235    guaiFENesin (MUCINEX) extended release tablet 1,200 mg  1,200 mg Oral BID Shazia Joyce MD   1,200 mg at 09/14/22 1235    insulin glargine (LANTUS) injection vial 14 Units  14 Units SubCUTAneous Nightly Shazia Joyce MD   14 Units at 09/13/22 2055    insulin lispro (HUMALOG) injection vial 0-16 Units  0-16 Sara Colin MD        dextrose bolus 10% 125 mL  125 mL IntraVENous PRN Antonio Ashley MD        Or    dextrose bolus 10% 250 mL  250 mL IntraVENous PRN Antonio Ashley MD        glucagon (rDNA) injection 1 mg  1 mg SubCUTAneous PRN Antonio Ashley MD        glucose chewable tablet 16 g  4 tablet Oral PRN Antonio Ashley MD        ondansetron (ZOFRAN-ODT) disintegrating tablet 4 mg  4 mg Oral Q6H PRN Antonio Ashley MD        oxyCODONE (ROXICODONE) immediate release tablet 5 mg  5 mg Oral Q4H PRN Antonio Ashley MD   5 mg at 09/13/22 1952    [Held by provider] apixaban (ELIQUIS) tablet 5 mg  5 mg Oral BID Antonio Ashley MD   5 mg at 09/11/22 2125    sodium chloride flush 0.9 % injection 10 mL  10 mL IntraVENous PRN Antonio Ashley MD        traMADol Colleen Points) tablet 50 mg  50 mg Oral Q6H PRN Antonio Ashley MD   50 mg at 09/11/22 1620    atorvastatin (LIPITOR) tablet 20 mg  20 mg Oral Nightly Antonio Ashley MD   20 mg at 09/13/22 1953    calcitonin (MIACALCIN) nasal spray 1 spray  1 spray Alternating Nares Daily Antonio Ashley MD   1 spray at 09/14/22 1243    carvedilol (COREG) tablet 6.25 mg  6.25 mg Oral BID WC Antonio Ashley MD   6.25 mg at 09/14/22 1236    [Held by provider] clopidogrel (PLAVIX) tablet 75 mg  75 mg Oral Daily Antonio Ashley MD   75 mg at 09/11/22 0944    ferrous gluconate (FERGON) tablet 324 mg  324 mg Oral Daily with breakfast Antonio Ashley MD   324 mg at 09/14/22 [address] problems     Patient Active Problem List   Diagnosis    H/O endarterectomy    Arterial degeneration    Atrial fibrillation (HCC)    Complicated wound infection    Allergic rhinitis    Elevated troponin    HTN (hypertension)    Pulmonary emphysema (HCC)    Atherosclerosis of native arteries of extremity with intermittent claudication (HCC)    Severe obesity (BMI 35.0-39. 9) with comorbidity ([address]    COPD (chronic obstructive pulmonary disease) ([facility]    Personal history of tobacco use, presenting hazards to health    Intermittent spinal claudication (HCC)    Cigarette nicotine dependence in remission    Irregular heart beat    Acute metabolic encephalopathy    Chest pain    Hiatal hernia    Diabetic neuropathy (HCC)    Normocytic anemia    Chronic pain of right knee    Sleep apnea    Osteoarthritis    Encounter for long-term (current) drug use    Ischemic cardiomyopathy    Ventricular tachycardia (HCC)    VT (ventricular tachycardia) (HCC)    Coronary artery disease involving native coronary artery of native heart without angina pectoris    Benign neoplasm of colon    PAD (peripheral artery disease) (HCC)    Asthma    Orthostatic hypotension    Hyperlipidemia    S/P angioplasty with stent    DM (diabetes mellitus) type II, controlled, with peripheral vascular disorder (HCC)    Toe laceration    Obstructive sleep apnea    MINI (acute kidney injury) ([facility]    Type 2 diabetes with nephropathy (HCC)    Hypoxia    Abnormal nuclear cardiac imaging test    PVC's (premature ventricular contractions)    Asbestos exposure    Sepsis ([facility]    ICD (implantable cardioverter-defibrillator) in place    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, vertebra(e), init ([facility]    General weakness    Acute cystitis without hematuria    Low back pain without sciatica    Back pain    DNI (do not intubate)    Elevated liver enzymes    Anemia    Hyponatremia    Physical debility    Lumbar discitis    Psoas muscle abscess ([address]        I have reviewed, updated, and verified this note's content and spent 38 minutes of my 42 minutes visit performing counseling and coordination of care regarding medical management.        Signed By: Mattie Broderick MD     September 14, 2022

## 2022-09-14 NOTE — BRIEF OP NOTE
Department of Interventional Radiology  [phone]        Interventional Radiology Brief Procedure Note    Patient: Leonardo Rai MRN: [id number]  SSN: [id number]    YOB: 1945  Age: 65 y.o. Sex: male      Date of Procedure: 9/14/2022    Pre-Procedure Diagnosis: Possible perispinous/psoas fluid collection    Post-Procedure Diagnosis: SAME    Procedure(s): Image Guided Aspiration and Biopsy adjacent to L34 disc and L4 vertebral body. Brief Description of Procedure: No fluid to aspirate. Biopsy and washing performed. Performed By: Glenn Brizuela MD     Assistants: None    Anesthesia:Moderate Sedation    Estimated Blood Loss: Less than 10ml    Specimens:  Microbiology    Implants:  None    Findings:  No fluid to aspirate. Biopsy and washing performed. Complications: None    Recommendations: 1 hour bedrest.  Resume Plavix in 24 hours. Resume Eliquis in 48 hours.      Follow Up: PRN    Signed By: Glenn Brizuela MD     September 14, 2022

## 2022-09-14 NOTE — PROGRESS NOTES
OT attempted to see pt in am. Pt was off floor for a procedure. Will attempt at another time/date as schedule permits.      Thank you   LAVINIA Mcgarry/YESSY

## 2022-09-14 NOTE — FLOWSHEET NOTE
09/13/22 1952   Vital Signs   Resp 18   Pain Assessment   Pain Assessment 0-10   Pain Level 7   Patient's Stated Pain Goal 0 - No pain   Pain Location Hip;Leg   Pain Orientation Right;Left   Pain Descriptors Spasm   Opioid-Induced Sedation   POSS Score 1     Percocet 5mg PO given.

## 2022-09-14 NOTE — CARE COORDINATION
MSN, Cm:  patient had an image guided aspiration and biopsy. [facility] watching for possible admission. Case Management will continue to follow.

## 2022-09-14 NOTE — PRE SEDATION
Sedation Pre-Procedure Note    Patient Name: Zenia Alvarenga   YOB: 1945  Room/Bed: [facility]  Medical Record Number: [id number]  Date: 9/14/2022   Time: 9:34 AM       Indication:  MRI suspicious for perispinous/psoas muscle fluid collection, suspected osteomyelitis, back pain prohibiting participation in PT. Today, patient also c/o hip pain. After a long conversation, he agrees to proceed to fluid aspiration, if possible, in order to confirm or refute psoas abscess. Consent: I have discussed with the patient and/or the patient representative the indication, alternatives, and the possible risks and/or complications of the planned procedure and the anesthesia methods. The patient and/or patient representative appear to understand and agree to proceed.     Vital Signs:   Vitals:    09/14/22 0825   BP: (!) 151/69   Pulse: 67   Resp: 19   Temp: 98.1 °F (36.7 °C)   SpO2: 97%       Past Medical History:   has a past medical history of Acute respiratory failure with hypoxia (HCC), MINI (acute kidney injury) ([address] MINI (acute kidney injury) ([address] MINI (acute kidney injury) ([address] Allergic rhinitis, Asthma, Benign essential hypertension, Benign neoplasm of colon, CAD (coronary artery disease), CAD (coronary artery disease), CAP (community acquired pneumonia), Cardiomyopathy ([address] Complicated wound infection, COPD (chronic obstructive pulmonary disease) with emphysema ([address] COPD exacerbation ([address] COVID-19, Diabetes mellitus type 2, controlled ([address] Diabetic neuropathy ([address] Disruption of wound, unspecified, Encounter for long-term (current) use of other high-risk medications, Extremity atherosclerosis with intermittent claudication ([address] H/O endarterectomy, Hiatal hernia, History of 2019 novel coronavirus disease (COVID-19), Hyperglycemia due to type 1 diabetes mellitus ([address] Hyperlipidemia, ICD (implantable cardioverter-defibrillator) in place, Monomorphic ventricular tachycardia ([address] Myocardial infarction [facility]), Myocardial infarction ([facility] Obesity, Orthostatic hypotension, Osteoarthritis, Peripheral vascular disease ([facility] PNA (pneumonia), PVC (premature ventricular contraction), Rash, Seroma complicating a procedure, Sleep apnea, Toe laceration, Type 2 diabetes with nephropathy ([facility] Uncontrolled type 2 diabetes mellitus ([facility] and Vertiginous syndromes and other disorders of vestibular system. Past Surgical History:   has a past surgical history that includes Cardiac catheterization (12/05/2018); Coronary angioplasty with stent (12/05/2018); Coronary angioplasty with stent (1999); vascular surgery (11/5/14); vascular surgery (9/11/14); pr cardiac surg procedure unlist (1999); pr abdomen surgery proc unlisted (1960); and Spine surgery (N/A, 7/19/2022).     Medications:   Scheduled Meds:    nicotine  1 patch TransDERmal Daily    albuterol sulfate HFA  2 puff Inhalation BID    mometasone-formoterol  2 puff Inhalation BID    acetaminophen  650 mg Oral TID    fluticasone  1 spray Each Nostril Daily    furosemide  40 mg Oral Daily    gabapentin  300 mg Oral TID    glipiZIDE  5 mg Oral QAM AC    guaiFENesin  1,200 mg Oral BID    insulin glargine  14 Units SubCUTAneous Nightly    insulin lispro  0-16 Units SubCUTAneous TID WC    insulin lispro  0-4 Units SubCUTAneous Nightly    latanoprost  1 drop Both Eyes Nightly    lidocaine  1 patch TransDERmal Daily    melatonin  6 mg Oral Nightly    metFORMIN  500 mg Oral BID WC    miconazole   Topical BID    montelukast  10 mg Oral Nightly    naproxen  500 mg Oral BID WC    pantoprazole  40 mg Oral BID AC    polyethylene glycol  17 g Oral Daily    sennosides-docusate sodium  2 tablet Oral Daily    sodium chloride flush  10 mL IntraVENous BID    amiodarone  200 mg Oral Daily    traZODone  25 mg Oral Nightly    [Held by provider] apixaban  5 mg Oral BID    atorvastatin  20 mg Oral Nightly    calcitonin  1 spray Alternating Nares Daily    carvedilol  6.25 mg Oral BID  [Held by provider] clopidogrel  75 mg Oral Daily    ferrous gluconate  324 mg Oral Daily with breakfast     Continuous Infusions:    dextrose       PRN Meds: HYDROmorphone, HYDROmorphone, albuterol sulfate HFA, bisacodyl, dextrose, dextrose bolus **OR** dextrose bolus, glucagon (rDNA), glucose, ondansetron, oxyCODONE, sodium chloride flush, traMADol  Home Meds:   Prior to Admission medications    Medication Sig Start Date End Date Taking? Authorizing Provider   furosemide (LASIX) 20 MG tablet Take 1 tablet by mouth in the morning. 8/9/22   Eliana Thompson MD   tamsulosin (FLOMAX) 0.4 MG capsule Take 1 capsule by mouth in the morning. 8/9/22   Eliana Thompson MD   pantoprazole (PROTONIX) 40 MG tablet Take 1 tablet by mouth in the morning and 1 tablet in the evening. Take before meals. 8/8/22   Eliana Thompson MD   amLODIPine (NORVASC) 5 MG tablet Take 1 tablet by mouth in the morning. 8/9/22   Eliana Thompson MD   OLANZapine zydis (ZYPREXA) 5 MG disintegrating tablet Take 1 tablet by mouth nightly 8/8/22   Eliana Thompson MD   QUEtiapine (SEROQUEL) 25 MG tablet Take 1 tablet by mouth in the morning.  8/9/22   Eliana Thompson MD   rosuvastatin (CRESTOR) 10 MG tablet TAKE 1 TABLET BY MOUTH EVERY DAY 7/29/22   Rosalva West MD   tiZANidine (ZANAFLEX) 2 MG tablet Take 1 tablet by mouth every 8 hours as needed (muscle spasms) 7/25/22   Rosalva West MD   fluticasone-salmeterol (ADVAIR) 250-50 MCG/ACT AEPB diskus inhaler Inhale 1 puff into the lungs in the morning and at bedtime 6/30/22   Historical Provider, MD   calcitonin (MIACALCIN) 200 UNIT/ACT nasal spray 1 spray by Nasal route daily 6/3/22   AARON Samuel - CNP   albuterol sulfate  (90 Base) MCG/ACT inhaler USE 2 PUFFS BY INHALATION 4 TIMES DAILY AS NEEDED FOR WHEEZING OR SHORTNESS OF BREATH. 1/14/22   Ar Automatic Reconciliation   amiodarone (CORDARONE) 200 MG tablet Take 200 mg by mouth 2 times daily 3/10/22   Ar Automatic Reconciliation   apixaban (ELIQUIS) 5 MG TABS tablet Take 5 mg by mouth every 12 hours 3/10/22   Ar Automatic Reconciliation   ascorbic acid (VITAMIN C) 500 MG tablet Take 500 mg by mouth    Ar Automatic Reconciliation   carvedilol (COREG) 25 MG tablet Take 25 mg by mouth 2 times daily (with meals) 3/10/22   Ar Automatic Reconciliation   Cholecalciferol 50 MCG (2000 UT) TABS Take 2,000 Units by mouth    Ar Automatic Reconciliation   clopidogrel (PLAVIX) 75 MG tablet Take 75 mg by mouth daily 3/11/22   Ar Automatic Reconciliation   fluticasone (FLONASE) 50 MCG/ACT nasal spray USE 1 OR 2 SPRAYS IN EACH NOSTRIL EVERY DAY. 3/15/22   Ar Automatic Reconciliation   glipiZIDE (GLUCOTROL XL) 10 MG extended release tablet TAKE 1 TABLET BY MOUTH TWICE DAILY 1/14/22   Ar Automatic Reconciliation   glucosamine-chondroitin 750-600 MG TABS tablet Take by mouth 2 times daily    Ar Automatic Reconciliation   guaiFENesin 1200 MG TB12 Take 1,200 mg by mouth 2 times daily as needed    Ar Automatic Reconciliation   latanoprost (XALATAN) 0.005 % ophthalmic solution Apply 1 drop to eye    Ar Automatic Reconciliation   magnesium oxide (MAG-OX) 400 (240 Mg) MG tablet TAKE 1 TABLET BY MOUTH EVERY DAY 1/14/22   Ar Automatic Reconciliation   montelukast (SINGULAIR) 10 MG tablet TAKE 1 TABLET BY MOUTH EVERY DAY AT BEDTIME 3/15/22   Ar Automatic Reconciliation   nitroGLYCERIN (NITROSTAT) 0.4 MG SL tablet Place 0.4 mg under the tongue 3/11/22   Ar Automatic Reconciliation   polyethylene glycol (GLYCOLAX) 17 GM/SCOOP powder Take 17 g by mouth daily 9/21/21   Ar Automatic Reconciliation   SITagliptin (JANUVIA) 100 MG tablet TAKE 1 TABLET BY MOUTH EVERY DAY 3/15/22   Ar Automatic Reconciliation   valsartan (DIOVAN) 320 MG tablet Take 320 mg by mouth daily 3/10/22   Ar Automatic Reconciliation   metFORMIN (GLUCOPHAGE) 500 MG tablet TAKE TWO TABLETS BY MOUTH 2 TIMES A DAY 3/23/22 8/8/22  Ar Automatic Reconciliation     Additional Medication Information:  Eliquis and Plavix are on hold. Pre-Sedation Documentation and Exam:   Vital signs have been reviewed (see flow sheet for vitals).     Mallampati Airway Assessment:  Mallampati Class  IV - (soft palate not visible)    ASA Classification:  Class 3 - A patient with severe systemic disease that limits activity but is not incapacitating    Sedation/ Anesthesia Plan:   intravenous sedation    Patient is an appropriate candidate for plan of sedation: yes    Electronically signed by Drake Singh MD on 9/14/2022 at 9:34 AM

## 2022-09-14 NOTE — FLOWSHEET NOTE
09/13/22 2050   Pain Assessment   Pain Assessment None - Denies Pain   Pain Level 0   Response to Pain Intervention Patient satisfied   Side Effects No reported side effects

## 2022-09-14 NOTE — FLOWSHEET NOTE
Patient on cpap with 2 L. Safety measures noted. Will continue to monitor per policy.      09/14/22 0756   Handoff   Communication Given Shift Handoff   Handoff Received From Chantel Ho RN   Handoff Communication Face to Face   Time Handoff Given [id number]

## 2022-09-15 LAB
ANION GAP SERPL CALC-SCNC: 3 MMOL/L (ref 4–13)
BASOPHILS # BLD: 0 K/UL (ref 0–0.2)
BASOPHILS NFR BLD: 1 % (ref 0–2)
BUN SERPL-MCNC: 25 MG/DL (ref 8–23)
CALCIUM SERPL-MCNC: 9.2 MG/DL (ref 8.3–10.4)
CHLORIDE SERPL-SCNC: 103 MMOL/L (ref 101–110)
CO2 SERPL-SCNC: 32 MMOL/L (ref 21–32)
CREAT SERPL-MCNC: 1.1 MG/DL (ref 0.8–1.5)
DIFFERENTIAL METHOD BLD: ABNORMAL
EOSINOPHIL # BLD: 0.2 K/UL (ref 0–0.8)
EOSINOPHIL NFR BLD: 3 % (ref 0.5–7.8)
ERYTHROCYTE [DISTWIDTH] IN BLOOD BY AUTOMATED COUNT: 17.2 % (ref 11.9–14.6)
GLUCOSE BLD STRIP.AUTO-MCNC: 104 MG/DL (ref 65–100)
GLUCOSE BLD STRIP.AUTO-MCNC: 124 MG/DL (ref 65–100)
GLUCOSE BLD STRIP.AUTO-MCNC: 183 MG/DL (ref 65–100)
GLUCOSE BLD STRIP.AUTO-MCNC: 93 MG/DL (ref 65–100)
GLUCOSE SERPL-MCNC: 87 MG/DL (ref 65–100)
HCT VFR BLD AUTO: 29.4 % (ref 41.1–50.3)
HGB BLD-MCNC: 8.9 G/DL (ref 13.6–17.2)
IMM GRANULOCYTES # BLD AUTO: 0 K/UL (ref 0–0.5)
IMM GRANULOCYTES NFR BLD AUTO: 1 % (ref 0–5)
LYMPHOCYTES # BLD: 1.3 K/UL (ref 0.5–4.6)
LYMPHOCYTES NFR BLD: 16 % (ref 13–44)
MCH RBC QN AUTO: 30.1 PG (ref 26.1–32.9)
MCHC RBC AUTO-ENTMCNC: 30.3 G/DL (ref 31.4–35)
MCV RBC AUTO: 99.3 FL (ref 79.6–97.8)
MONOCYTES # BLD: 0.6 K/UL (ref 0.1–1.3)
MONOCYTES NFR BLD: 8 % (ref 4–12)
NEUTS SEG # BLD: 5.8 K/UL (ref 1.7–8.2)
NEUTS SEG NFR BLD: 71 % (ref 43–78)
NRBC # BLD: 0 K/UL (ref 0–0.2)
PLATELET # BLD AUTO: 247 K/UL (ref 150–450)
PMV BLD AUTO: 9.7 FL (ref 9.4–12.3)
POTASSIUM SERPL-SCNC: 4.4 MMOL/L (ref 3.5–5.1)
RBC # BLD AUTO: 2.96 M/UL (ref 4.23–5.6)
SERVICE CMNT-IMP: ABNORMAL
SERVICE CMNT-IMP: NORMAL
SODIUM SERPL-SCNC: 138 MMOL/L (ref 136–145)
WBC # BLD AUTO: 8 K/UL (ref 4.3–11.1)

## 2022-09-15 PROCEDURE — 6370000000 HC RX 637 (ALT 250 FOR IP): Performed by: INTERNAL MEDICINE

## 2022-09-15 PROCEDURE — 36415 COLL VENOUS BLD VENIPUNCTURE: CPT

## 2022-09-15 PROCEDURE — 85025 COMPLETE CBC W/AUTO DIFF WBC: CPT

## 2022-09-15 PROCEDURE — 94760 N-INVAS EAR/PLS OXIMETRY 1: CPT

## 2022-09-15 PROCEDURE — 2700000000 HC OXYGEN THERAPY PER DAY

## 2022-09-15 PROCEDURE — 80048 BASIC METABOLIC PNL TOTAL CA: CPT

## 2022-09-15 PROCEDURE — 97110 THERAPEUTIC EXERCISES: CPT

## 2022-09-15 PROCEDURE — 94640 AIRWAY INHALATION TREATMENT: CPT

## 2022-09-15 PROCEDURE — 2580000003 HC RX 258: Performed by: INTERNAL MEDICINE

## 2022-09-15 PROCEDURE — 1100000000 HC RM PRIVATE

## 2022-09-15 PROCEDURE — 97530 THERAPEUTIC ACTIVITIES: CPT

## 2022-09-15 PROCEDURE — 82962 GLUCOSE BLOOD TEST: CPT

## 2022-09-15 PROCEDURE — 97112 NEUROMUSCULAR REEDUCATION: CPT

## 2022-09-15 RX ADMIN — ALBUTEROL SULFATE 2 PUFF: 90 AEROSOL, METERED RESPIRATORY (INHALATION) at 19:39

## 2022-09-15 RX ADMIN — ATORVASTATIN CALCIUM 20 MG: 20 TABLET, FILM COATED ORAL at 21:45

## 2022-09-15 RX ADMIN — PANTOPRAZOLE SODIUM 40 MG: 40 TABLET, DELAYED RELEASE ORAL at 16:09

## 2022-09-15 RX ADMIN — SODIUM CHLORIDE, PRESERVATIVE FREE 10 ML: 5 INJECTION INTRAVENOUS at 21:46

## 2022-09-15 RX ADMIN — NAPROXEN 500 MG: 250 TABLET ORAL at 08:24

## 2022-09-15 RX ADMIN — Medication 6 MG: at 21:45

## 2022-09-15 RX ADMIN — GLIPIZIDE 5 MG: 5 TABLET ORAL at 08:23

## 2022-09-15 RX ADMIN — CARVEDILOL 6.25 MG: 3.12 TABLET, FILM COATED ORAL at 16:43

## 2022-09-15 RX ADMIN — INSULIN GLARGINE 14 UNITS: 100 INJECTION, SOLUTION SUBCUTANEOUS at 22:20

## 2022-09-15 RX ADMIN — ACETAMINOPHEN 650 MG: 325 TABLET ORAL at 21:45

## 2022-09-15 RX ADMIN — SENNOSIDES AND DOCUSATE SODIUM 2 TABLET: 8.6; 5 TABLET ORAL at 08:23

## 2022-09-15 RX ADMIN — PANTOPRAZOLE SODIUM 40 MG: 40 TABLET, DELAYED RELEASE ORAL at 06:16

## 2022-09-15 RX ADMIN — FLUTICASONE PROPIONATE 1 SPRAY: 50 SPRAY, METERED NASAL at 08:34

## 2022-09-15 RX ADMIN — ANTI-FUNGAL POWDER MICONAZOLE NITRATE TALC FREE: 1.42 POWDER TOPICAL at 08:33

## 2022-09-15 RX ADMIN — NAPROXEN 500 MG: 250 TABLET ORAL at 16:43

## 2022-09-15 RX ADMIN — FERROUS GLUCONATE 324 MG: 324 TABLET ORAL at 08:23

## 2022-09-15 RX ADMIN — AMIODARONE HYDROCHLORIDE 200 MG: 200 TABLET ORAL at 08:24

## 2022-09-15 RX ADMIN — GABAPENTIN 300 MG: 300 CAPSULE ORAL at 08:23

## 2022-09-15 RX ADMIN — METFORMIN HYDROCHLORIDE 500 MG: 500 TABLET ORAL at 08:23

## 2022-09-15 RX ADMIN — ALBUTEROL SULFATE 2 PUFF: 90 AEROSOL, METERED RESPIRATORY (INHALATION) at 08:44

## 2022-09-15 RX ADMIN — GABAPENTIN 300 MG: 300 CAPSULE ORAL at 14:20

## 2022-09-15 RX ADMIN — GUAIFENESIN 1200 MG: 600 TABLET ORAL at 08:23

## 2022-09-15 RX ADMIN — TRAZODONE HYDROCHLORIDE 25 MG: 50 TABLET ORAL at 21:45

## 2022-09-15 RX ADMIN — SODIUM CHLORIDE, PRESERVATIVE FREE 10 ML: 5 INJECTION INTRAVENOUS at 08:34

## 2022-09-15 RX ADMIN — GABAPENTIN 300 MG: 300 CAPSULE ORAL at 21:45

## 2022-09-15 RX ADMIN — LATANOPROST 1 DROP: 50 SOLUTION OPHTHALMIC at 21:46

## 2022-09-15 RX ADMIN — FUROSEMIDE 40 MG: 40 TABLET ORAL at 08:23

## 2022-09-15 RX ADMIN — CLOPIDOGREL BISULFATE 75 MG: 75 TABLET ORAL at 14:19

## 2022-09-15 RX ADMIN — MONTELUKAST 10 MG: 10 TABLET, FILM COATED ORAL at 21:45

## 2022-09-15 RX ADMIN — POLYETHYLENE GLYCOL 3350 17 G: 17 POWDER, FOR SOLUTION ORAL at 08:23

## 2022-09-15 RX ADMIN — CALCITONIN SALMON 1 SPRAY: 200 SPRAY, METERED NASAL at 09:10

## 2022-09-15 RX ADMIN — GUAIFENESIN 1200 MG: 600 TABLET ORAL at 21:45

## 2022-09-15 RX ADMIN — ACETAMINOPHEN 650 MG: 325 TABLET ORAL at 14:20

## 2022-09-15 RX ADMIN — ANTI-FUNGAL POWDER MICONAZOLE NITRATE TALC FREE: 1.42 POWDER TOPICAL at 21:47

## 2022-09-15 RX ADMIN — CARVEDILOL 6.25 MG: 3.12 TABLET, FILM COATED ORAL at 08:23

## 2022-09-15 RX ADMIN — ACETAMINOPHEN 650 MG: 325 TABLET ORAL at 08:23

## 2022-09-15 ASSESSMENT — PAIN SCALES - GENERAL
PAINLEVEL_OUTOF10: 4
PAINLEVEL_OUTOF10: 0
PAINLEVEL_OUTOF10: 3

## 2022-09-15 ASSESSMENT — PAIN DESCRIPTION - DESCRIPTORS: DESCRIPTORS: ACHING

## 2022-09-15 ASSESSMENT — PAIN DESCRIPTION - LOCATION: LOCATION: HIP

## 2022-09-15 ASSESSMENT — PAIN DESCRIPTION - ORIENTATION: ORIENTATION: LEFT;RIGHT

## 2022-09-15 NOTE — FLOWSHEET NOTE
09/14/22 2136 09/14/22 2228   Vital Signs   Temp  --  98 °F (36.7 °C)   Temp Source  --  Axillary   Heart Rate  --  63   Heart Rate Source  --  Monitor   Resp 18 20   BP  --  99/67   BP Location  --  Left upper arm   BP Method  --  Automatic   MAP (Calculated)  --  77.67   Level of Consciousness  --  0   MEWS Score  --  2   Pain Assessment   Pain Assessment  --  None - Denies Pain   Pain Level  --  0   Response to Pain Intervention  --  Patient satisfied   Side Effects  --  No reported side effects   Oxygen Therapy   SpO2  --  96 %

## 2022-09-15 NOTE — FLOWSHEET NOTE
09/14/22 2136   Vital Signs   Resp 18   Pain Assessment   Pain Assessment 0-10   Pain Level 6   Patient's Stated Pain Goal 0 - No pain   Pain Location Back   Pain Orientation Lower   Pain Descriptors Aching;Cramping   Opioid-Induced Sedation   POSS Score 1     Ultram 50mg PO given.

## 2022-09-15 NOTE — PROGRESS NOTES
Progress Note    Patient: Chloe Goyal MRN: [id number]  SSN: [id number]    YOB: 1945  Age: 65 y.o. Sex: male      Admit Date: 9/7/2022    LOS: 8 days     Assessment and Plan:   65 y.o. male with a past medical history significant for ischemic cardiomyopathy with ICD/pacemaker, EF of 30 to 35%, A. fib on Eliquis, diabetes, obesity, peripheral artery disease anemia, COPD with asthma, coronary artery disease, obstructive sleep apnea on CPAP with recent L2 L4 kyphoplasty who was recently discharged from [address] [place] [address] August 8, 2022 to [facility] after being admitted for UTI worsening weakness status post surgery with several falls. MRI of the lumbar spine and cervical spine were done and he was found to have a lumbar spine epidural abscess and symptoms abscess    1. Lumbar spine and psoas abscess with lumbar spine osteomyelitis and complicated wound infection  -Initially on ceftriaxone and vancomycin until 9/9/2022  -As per ID recommendation holding antibiotics for now  -S/p biopsy and wash out by IR  -Follow biopsy cultures  -Symptomatic management  -Pain management  -Infectious disease and IR recommendations appreciated    2. Atrial fibrillation  -Currently rate controlled  -Continue Coreg  -Holding Eliquis in the setting of abscess drainage today    3. Peripheral artery disease  -Resume Plavix  -Continue statin    4. COPD  -Currently not on exacerbation  -Continue home inhalers  -Albuterol as needed    5.   Diabetes mellitus  -Basal insulin  -Insulin sliding scale  -Blood sugar checks before meals and at bedtime    DVT prophylaxis holding Eliquis for now    Subjective:   65 y.o. male with a past medical history significant for ischemic cardiomyopathy with ICD/pacemaker, EF of 30 to 35%, A. fib on Eliquis, diabetes, obesity, peripheral artery disease anemia, COPD with asthma, coronary artery disease, obstructive sleep apnea on CPAP with recent L2 L4 kyphoplasty who was recently disccharged from [facility] August 8, 2022 to [facility] after being admitted for UTI worsening weakness status post surgery with several falls. MRI of the lumbar spine and cervical spine were done and he was found to have a lumbar spine epidural abscess and symptoms abscess. His CRP was elevated at 12.4. He had noted osteomyelitis of the lower spine. His oral anticoagulation was held and he was placed on a heparin drip. He was also placed on vancomycin and ceftriaxone after IR drain abscess. He was followed by infectious disease and treated for 6 weeks with Vanco and Rocephin. He developed altered mental status. This was thought to be due to metabolic cause. Unfortunately he started having increasing complaints of back pain confusion. He was transferred to [facility] for ongoing therapy and antibiotic needs. Cardiology was consulted and his ICD was interrogated and found to be functioning well. A Tomas was placed due to his urinary retention. Urology was consulted. He had a complete altered mental status work-up and it was thought he had acute encephalopathy secondary to especially opiates and antipsychotics. He has been weaning off Zyprexa with plans to continue IV antibiotics for total of 6 weeks with estimated end of therapy 9/20/2022. He was transferred to inpatient rehab floor on August 26, 2022. He was continued on antibiotics he continued to have lower back pain and has been poorly participative in therapy over the last few days. MRI of the spine done today showed an apparent worsening osteomyelitis and psoas muscle abscess. It also showed compression of the cauda equina nerve roots at multiple levels in the lumbar spine as before. Multilevel degenerative disc disease and facet arthropathy. Bilateral psoas muscle myositis with new left psoas muscle abscess at the level of L3-L4. Because of the abscesses ID was sultan.   And recommended stopping all antibiotics at this time and getting IR to aspirate the abscess for culture likely on Monday of next week. Dr. Sin Alberto was also consulted and felt no surgical role indicated at this time and that his degenerative stenosis can be addressed once he is infection free. Patient seen and examined at bedside. This morning still presenting with back and right hip pain, but improved from yesterday. Otherwise denies any chest pain, no abdominal pain, no nausea or vomiting. Objective:     Vitals:    09/15/22 0335 09/15/22 0831 09/15/22 0844 09/15/22 1106   BP: 123/64 (!) 146/77  (!) 131/58   Pulse: 60 63 65 63   Resp: 20 18 17 18   Temp: 97.9 °F (36.6 °C) 97.7 °F (36.5 °C)  97.9 °F (36.6 °C)   TempSrc: Oral Oral     SpO2: 95% 93% 95% 93%   Weight:       Height:            Intake and Output:  Current Shift: 09/15 0701 - 09/15 1900  In: 210 [P.O.:200;  I.V.:10]  Out: -   Last three shifts: 09/13 1901 - 09/15 0700  In: 480 [P.O.:480]  Out: 2000 [Urine:2000]    ROS  10 ROS negative except from stated on subjective    Physical Exam:   General: Alert, oriented, NAD  HEENT: NC/AT, EOM are intact  Neck: supple, no JVD  Cardiovascular: RRR, S1, S2, no murmurs  Respiratory: Lungs are clear, no wheezes or rales  Abdomen: Soft, NT, ND  Back: No CVA tenderness, no paraspinal tenderness  Extremities: LE without pedal edema, no erythema  Neuro: A&O, CN are intact, no focal deficits  Skin: no rash or ulcers  Psych: good mood and affect    Lab/Data Review:  I have personally reviewed patients laboratory data showing  Recent Results (from the past 24 hour(s))   POCT Glucose    Collection Time: 09/14/22  4:58 PM   Result Value Ref Range    POC Glucose 91 65 - 100 mg/dL    Performed by: Floresita Villa    POCT Glucose    Collection Time: 09/14/22  8:42 PM   Result Value Ref Range    POC Glucose 108 (H) 65 - 100 mg/dL    Performed by: AcorLCJohnPCT    CBC with Auto Differential    Collection Time: 09/15/22  3:23 AM   Result Value Ref Range    WBC 8.0 4.3 - 11.1 K/uL    RBC 2.96 (L) 4.23 - 5.6 M/uL    Hemoglobin 8.9 (L) 13.6 - 17.2 g/dL    Hematocrit 29.4 (L) 41.1 - 50.3 %    MCV 99.3 (H) 79.6 - 97.8 FL    MCH 30.1 26.1 - 32.9 PG    MCHC 30.3 (L) 31.4 - 35.0 g/dL    RDW 17.2 (H) 11.9 - 14.6 %    Platelets 345 430 - 498 K/uL    MPV 9.7 9.4 - 12.3 FL    nRBC 0.00 0.0 - 0.2 K/uL    Differential Type AUTOMATED      Seg Neutrophils 71 43 - 78 %    Lymphocytes 16 13 - 44 %    Monocytes 8 4.0 - 12.0 %    Eosinophils % 3 0.5 - 7.8 %    Basophils 1 0.0 - 2.0 %    Immature Granulocytes 1 0.0 - 5.0 %    Segs Absolute 5.8 1.7 - 8.2 K/UL    Absolute Lymph # 1.3 0.5 - 4.6 K/UL    Absolute Mono # 0.6 0.1 - 1.3 K/UL    Absolute Eos # 0.2 0.0 - 0.8 K/UL    Basophils Absolute 0.0 0.0 - 0.2 K/UL    Absolute Immature Granulocyte 0.0 0.0 - 0.5 K/UL   Basic Metabolic Panel    Collection Time: 09/15/22  3:23 AM   Result Value Ref Range    Sodium 138 136 - 145 mmol/L    Potassium 4.4 3.5 - 5.1 mmol/L    Chloride 103 101 - 110 mmol/L    CO2 32 21 - 32 mmol/L    Anion Gap 3 (L) 4 - 13 mmol/L    Glucose 87 65 - 100 mg/dL    BUN 25 (H) 8 - 23 MG/DL    Creatinine 1.10 0.8 - 1.5 MG/DL    GFR African American >60 >60 ml/min/1.73m2    GFR Non- >60 >60 ml/min/1.73m2    Calcium 9.2 8.3 - 10.4 MG/DL   POCT Glucose    Collection Time: 09/15/22  7:58 AM   Result Value Ref Range    POC Glucose 104 (H) 65 - 100 mg/dL    Performed by: Saul    POCT Glucose    Collection Time: 09/15/22 11:55 AM   Result Value Ref Range    POC Glucose 93 65 - 100 mg/dL    Performed by: Saul       [unfilled]     Image:  I have personally reviewed patients imaging showing  CT GUIDED FNA   Final Result   CT-guided Needle aspiration and core biopsy of of the phlegmon   adjacent to the left side of L3-L4 disc and L4 vertebral body. The 1st needle   placement adjacent to the L4 vertebral body returned no fluid, therefore a core   biopsy was obtained.   The 2nd needle placement adjacent to the L3-L4 vertebral   body initially returned no fluid, and therefore a few cc of inoculum saline was   administered and then aspirated. Plan:  Specimen(s) sent to the Microbiology department for evaluation. One hour   bedrest.  Resume Plavix tomorrow. Resume Eliquis in 48 hours. _______________________________________________________________   Technique: All CT scans at this facility are performed using dose   reduction/dose modulation techniques, as appropriate to the performed exam,   including the following:  Automated Exposure Control; Adjustment of the MA   and/or kF according to patient size (this includes techniques or standardized   protocols for targeted exams where dose is matched to indication/reason for   exam); and Use of Iterative Reconstruction Technique. PROCEDURE SUMMARY:   - Percutaneous CT-guided Coaxial Core Needle Biopsy and Fluid Aspiration      PROCEDURE DETAILS:      Pre-procedure   Reference imaging for biopsy target: MRI 9/6/2022, 7/29/2022, 6/2/2022 as well   as CT 7/27/2022      Consent:  Informed written and oral consent for the procedure was obtained after   explanation of risks (including, but not limitted to:  hemorrhage, infection,   visceral injury, nondiagnostic biopsy) benefits and alternatives. The patient's   questions were answered to their satisfaction. They stated understanding and   requested that we proceed. Final verification:  A time-out identifying the patient and planned procedure   was performed prior to this procedure. Preparation: (MIPS) Maximal sterile barrier technique (including:  cap, mask,   sterile gown, sterile gloves, sterile sheet, hand hygiene, and cutaneous   antisepsis) was used. Anesthesia/sedation   Level of anesthesia/sedation: Moderate sedation (conscious sedation)   Anesthesia/sedation administered by:  Independent trained observer under   attending supervision with continuous monitoring of the patient?s level of   consciousness and physiologic status   Total intra-service sedation time (minutes): 27      Imaging prior to biopsy   The patient was positioned prone. Initial imaging was performed using   noncontrast CT. Biopsy target:   - Maximal diameter (cm): 0.7 -    Location: soft tissue density to the left of the L4 vertebral body. Other findings: Less distinct soft tissue density to the left of the L3-L4 disc   space. Biopsy    Local anesthesia was administered. Under CT guidance, the coaxial biopsy system   was advanced to the target and aspiration was attempted but no fluid was   returned. Therefore, through the coaxial needle, an 18-gauge core biopsy was   obtained and then placed into a sterile container. Coaxial needle: 17 gauge   Core needle biopsy device: [employer]   Core needle size: 18 gauge   Number of core specimens: 1      Attention was then turned to the subtle soft tissue density adjacent to the   L3-L4 disc. The coaxial needle was reassembled and then redirected using   intermittent CT scan imaging to this 2nd soft tissue density. The 17-gauge   coaxial needle was aspirated, returning no fluid. Therefore, 3 cc of inoculum   saline was administered and then aspirated as specimen. Large bore needle aspiration device: [employer]   Fine needle size: 17-gauge   Number of FNA specimens: 1      Needle removal   The biopsy needle was removed and a sterile dressing was applied. Tract embolization: None      Imaging following biopsy   Immediate post-biopsy imaging was not performed. Imaging modality: Not applicable.    Post-biopsy imaging findings: Not applicable      Contrast   Contrast agent: None   Contrast volume (mL): 0      Radiation Dose   CT dose length product (mGy-cm):  957.09       Additional Details   Additional description of procedure: None   Equipment details: None   Specimens removed: Microbiology   Estimated blood loss (mL): Less than 10   Standardized report: [email or web address]      Attestation   Signer name: Clifton Bhardwaj M.D. I attest that I personally performed the entire procedure. I reviewed the stored   images and agree with the report as written.               Current Facility-Administered Medications   Medication Dose Route Frequency Provider Last Rate Last Admin    nicotine (NICODERM CQ) 21 MG/24HR 1 patch  1 patch TransDERmal Daily Nina De La Garza MD        albuterol sulfate HFA (PROVENTIL;VENTOLIN;PROAIR) 108 (90 Base) MCG/ACT inhaler 2 puff  2 puff Inhalation BID Nina De La Garza MD   2 puff at 09/15/22 0844    mometasone-formoterol (DULERA) 100-5 MCG/ACT inhaler 2 puff  2 puff Inhalation BID Nina De La Garza MD   2 puff at 09/15/22 0844    HYDROmorphone HCl PF (DILAUDID) injection 0.25 mg  0.25 mg IntraVENous Q4H PRN Nina De La Garza MD   0.25 mg at 09/09/22 1114    HYDROmorphone HCl PF (DILAUDID) injection 0.5 mg  0.5 mg IntraVENous Q4H PRN Nina De La Garza MD   0.5 mg at 09/10/22 1540    acetaminophen (TYLENOL) tablet 650 mg  650 mg Oral TID Michael Chairez MD   650 mg at 09/15/22 0823    fluticasone (FLONASE) 50 MCG/ACT nasal spray 1 spray  1 spray Each Nostril Daily Michael Chairez MD   1 spray at 09/15/22 0834    furosemide (LASIX) tablet 40 mg  40 mg Oral Daily Michael Chairez MD   40 mg at 09/15/22 1096    gabapentin (NEURONTIN) capsule 300 mg  300 mg Oral TID Michael Chairez MD   300 mg at 09/15/22 0823    glipiZIDE (GLUCOTROL) tablet 5 mg  5 mg Oral QAM AC Michael Chairez MD   5 mg at 09/15/22 0823    guaiFENesin (MUCINEX) extended release tablet 1,200 mg  1,200 mg Oral BID Michael Chairez MD   1,200 mg at 09/15/22 0823    insulin glargine (LANTUS) injection vial 14 Units  14 Units SubCUTAneous Nightly Michael Chariez MD   14 Units at 09/14/22 8149    insulin lispro (HUMALOG) injection vial 0-16 Units  0-16 Units SubCUTAneous TID  Michael Chairez MD        insulin lispro (HUMALOG) injection vial 0-4 Units  0-4 Units SubCUTAneous Nightly Maida Newton MD        latanoprost (XALATAN) 0.005 % ophthalmic solution 1 drop  1 drop Both Eyes Nightly Maida Newton MD   1 drop at 09/14/22 2141    lidocaine 4 % external patch 1 patch  1 patch TransDERmal Daily Maida Newton MD   1 patch at 09/13/22 0827    melatonin tablet 6 mg  6 mg Oral Nightly Maida Newton MD   6 mg at 09/14/22 2135    miconazole (MICOTIN) 2 % powder   Topical BID Maida Newton MD   Given at 09/15/22 0833    montelukast (SINGULAIR) tablet 10 mg  10 mg Oral Nightly Maida Newton MD   10 mg at 09/14/22 2138    naproxen (NAPROSYN) tablet 500 mg  500 mg Oral BID WC Maida Newton MD   500 mg at 09/15/22 0824    pantoprazole (PROTONIX) tablet 40 mg  40 mg Oral BID AC Maida Newton MD   40 mg at 09/15/22 0616    polyethylene glycol (GLYCOLAX) packet 17 g  17 g Oral Daily Maida Newton MD   17 g at 09/15/22 0823    sennosides-docusate sodium (SENOKOT-S) 8.6-50 MG tablet 2 tablet  2 tablet Oral Daily Maida Newton MD   2 tablet at 09/15/22 7406    sodium chloride flush 0.9 % injection 10 mL  10 mL IntraVENous BID Maida Newton MD   10 mL at 09/15/22 4349    amiodarone (CORDARONE) tablet 200 mg  200 mg Oral Daily Maida Newton MD   200 mg at 09/15/22 0824    traZODone (DESYREL) tablet 25 mg  25 mg Oral Nightly Maida Newton MD   25 mg at 09/14/22 2135    albuterol sulfate HFA (PROVENTIL;VENTOLIN;PROAIR) 108 (90 Base) MCG/ACT inhaler 2 puff  2 puff Inhalation Q6H PRN Maida Newton MD        bisacodyl (DULCOLAX) suppository 10 mg  10 mg Rectal Daily PRN Maida Newton MD        dextrose 10 % infusion   IntraVENous Continuous PRN Maida Newton MD        dextrose bolus 10% 125 mL  125 mL IntraVENous PRN Maida Newton MD        Or    dextrose bolus 10% 250 mL  250 mL IntraVENous PRN Reyna CHAMPAGNE heart beat    Acute metabolic encephalopathy    Chest pain    Hiatal hernia    Diabetic neuropathy (HCC)    Normocytic anemia    Chronic pain of right knee    Sleep apnea    Osteoarthritis    Encounter for long-term (current) drug use    Ischemic cardiomyopathy    Ventricular tachycardia (HCC)    VT (ventricular tachycardia) (HCC)    Coronary artery disease involving native coronary artery of native heart without angina pectoris    Benign neoplasm of colon    PAD (peripheral artery disease) (HCC)    Asthma    Orthostatic hypotension    Hyperlipidemia    S/P angioplasty with stent    DM (diabetes mellitus) type II, controlled, with peripheral vascular disorder (HCC)    Toe laceration    Obstructive sleep apnea    MINI (acute kidney injury) ([address]    Type 2 diabetes with nephropathy (HCC)    Hypoxia    Abnormal nuclear cardiac imaging test    PVC's (premature ventricular contractions)    Asbestos exposure    Sepsis ([address]    ICD (implantable cardioverter-defibrillator) in place    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, vertebra(e), init ([address]    General weakness    Acute cystitis without hematuria    Low back pain without sciatica    Back pain    DNI (do not intubate)    Elevated liver enzymes    Anemia    Hyponatremia    Physical debility    Lumbar discitis    Psoas muscle abscess ([facility]        I have reviewed, updated, and verified this note's content and spent 36 minutes of my 40 minutes visit performing counseling and coordination of care regarding medical management.        Signed By: Gio Morris MD     September 15, 2022

## 2022-09-15 NOTE — PROGRESS NOTES
Physician Progress Note      PATIENT:               Michelle Zavala  CSN #:                  [id number]  :                       1945  ADMIT DATE:       2022 7:22 PM  [address] [place] DATE:  RESPONDING  PROVIDER #:        Jennifer Zelaya MD          QUERY TEXT:    Pt admitted with lumbar spine epidural abscess, psoas abscess, osteomyelitis   of the lower spine and underwent L2-L4 kyphoplasty on .? If possible,   please document in progress notes and discharge summary:    The medical record reflects the following:  Risk Factors:  65 y.o. male with a past medical history significant for   ischemic cardiomyopathy with ICD/pacemaker, EF of 30 to 35%, A. fib on   Eliquis, diabetes, obesity, peripheral artery disease anemia, COPD with   asthma, coronary artery disease, obstructive sleep apnea on CPAP with recent   L2 L4 kyphoplasty  Clinical Indicators:  MRI-  1. Worsening imaging findings of osteomyelitis discitis at L2-L4. 2. Slight improvement in persistent ventral epidural enhancing phlegmon versus  small abscess at the level of L2-L3. This superimposed on the preexisting  degenerative changes results in unchanged severe spinal canal narrowing. 3. Bilateral psoas muscle myositis with a new left psoas muscle abscess of the  level of L3-L4 and decrease but persistent left-sided abscess at the level of  L2-L3. Treatment: ABX, ID consulted, IR consulted for drainage (planned for  once   off eliquis and plavix for 3 days)    Thank you,  Christine Alfred RN, BSN, ANDREY Solorzano. [email or web address]  . Options provided:  -- Lumbar spine epidural abscess, psoas abscess?is a postoperative   complication  -- Lumbar spine epidural abscess, psoas abscess is not a postoperative   complication, but is due to other incidental risk factor, Please specify other   incidental risk factor.   -- Other - I will add my own diagnosis  -- Disagree - Not applicable / Not valid  -- Disagree - Clinically unable to determine / Unknown  -- Refer to Clinical Documentation Reviewer    PROVIDER RESPONSE TEXT:    Provider disagreed with this query.     Query created by: Melissa Canas on 9/12/2022 2:01 PM      Electronically signed by:  Livia Diaz MD 9/15/2022 9:40 AM

## 2022-09-15 NOTE — PROGRESS NOTES
Infectious Disease Progress Note      Today's Date: 9/15/2022   Admit Date: 9/7/2022    Impression:   L2-4 Discitis, culture negative: IV Vanc/ceftriaxone x 39 days; s/p repeat disc aspiration 09/14, several cultures sent and are pending    Plan: Will f/u 09/14 cultures from IR aspiration  Would NOT start empiric antibiotics but hold for results  If aspirate negative would not recommend any further antibiotics   Consider MRI right hip although I believe pain radiating from lumbar vertebrae; if only to assure wife and patient nothing wrong with hip    Anti-infectives:   Vancomycin (07/31-09/07)  Ceftriaxone (07/31-09/07)    Subjective: Interval Events:  Continued pain, per wife pain now primarily in hips and not midback; per patient pain greatest on right hip, radiates down posterior thigh; s/p aspiration 09/14. Patient is a 65 y.o. male seen by ID in consult from 07/30-08/05 when he presented with back pain and weakness on 07/27 following L2-L4 kyphoplasty on 07/20/22. MRI done revealed osteomyelitis. BC and aspirate were both negative and patient treated empirically with IV Vancomycin and Ceftriaxone for 42 days with EOT 09/09/22. He was transferred to [facility] on 08/08 and continued antibiotics; per patient and wife he was walking with assistance. He was transferred to rehab on 08/26/22. Around that time he developed a sharp hip pain radiating from his left to right hip and gradually was became unable to walk. A repeat MRI done 09/06 revealed worsening finding of osteo at L2-L4 and bilateral psoas abscess with new left psoas abscess at L2-3 level. Patricia Diana Per wife at bedside, they do not want Dr. Jeramie Morgan to operate anymore. Last APRs: Sed rate 36 ; CRP 3.0 on 08/19.       Allergies   Allergen Reactions    Azithromycin Hives    Oxaprozin Other (See Comments)     ELEVATED BLOOD PRESSURE        Review of Systems:  Comprehensive ROS negative other than mentioned in HPI    Objective:   Blood pressure (!) 146/77, pulse 65, temperature 97.7 °F (36.5 °C), temperature source Oral, resp. rate 17, height 6' 1\" (1.854 m), weight 242 lb (109.8 kg), SpO2 95 %. Visit Vitals  BP (!) 146/77   Pulse 65   Temp 97.7 °F (36.5 °C) (Oral)   Resp 17   Ht 6' 1\" (1.854 m)   Wt 242 lb (109.8 kg)   SpO2 95%   BMI 31.93 kg/m²     Temp (24hrs), Av °F (36.7 °C), Min:97.7 °F (36.5 °C), Max:98.1 °F (36.7 °C)       Exam:    General:  Alert, cooperative, morbidly obese, very debilitated and needs help rolling in the bed   Eyes:  Sclera anicteric. Pupils equally round and reactive to light. Mouth/Throat: Mucous membranes normal, oral pharynx clear   Neck: Supple   Lungs:   Clear to auscultation bilaterally, good effort   CV:  Regular rate and rhythm,no murmur, click, rub or gallop   Abdomen:   Soft, non-tender. bowel sounds normal. non-distended   Extremities: No cyanosis or edema   Skin: Skin color, texture, turgor normal. no acute rash or lesions   Lymph nodes:    Musculoskeletal:  No deformity; unable to palpate spine   Lines/Devices:  :  Right midline, nontender, no erythema   Psych: Alert and oriented, normal mood affect given the setting       CBC:  Recent Labs     09/15/22  0323   WBC 8.0   HGB 8.9*   HCT 29.4*            BMP:  Recent Labs     09/15/22  0323   BUN 25*      K 4.4      CO2 32         LFTS:  No results for input(s): ALT, TP, ALB in the last 72 hours.     Invalid input(s): TBILI, SGOT, AP      Data Review:   Recent Results (from the past 24 hour(s))   POCT Glucose    Collection Time: 22 12:15 PM   Result Value Ref Range    POC Glucose 105 (H) 65 - 100 mg/dL    Performed by: Julio Phelan    POCT Glucose    Collection Time: 22  4:58 PM   Result Value Ref Range    POC Glucose 91 65 - 100 mg/dL    Performed by: Julio Phelan    POCT Glucose    Collection Time: 22  8:42 PM   Result Value Ref Range    POC Glucose 108 (H) 65 - 100 mg/dL    Performed by: Jaz    CBC with Auto Differential Collection Time: 09/15/22  3:23 AM   Result Value Ref Range    WBC 8.0 4.3 - 11.1 K/uL    RBC 2.96 (L) 4.23 - 5.6 M/uL    Hemoglobin 8.9 (L) 13.6 - 17.2 g/dL    Hematocrit 29.4 (L) 41.1 - 50.3 %    MCV 99.3 (H) 79.6 - 97.8 FL    MCH 30.1 26.1 - 32.9 PG    MCHC 30.3 (L) 31.4 - 35.0 g/dL    RDW 17.2 (H) 11.9 - 14.6 %    Platelets 765 580 - 095 K/uL    MPV 9.7 9.4 - 12.3 FL    nRBC 0.00 0.0 - 0.2 K/uL    Differential Type AUTOMATED      Seg Neutrophils 71 43 - 78 %    Lymphocytes 16 13 - 44 %    Monocytes 8 4.0 - 12.0 %    Eosinophils % 3 0.5 - 7.8 %    Basophils 1 0.0 - 2.0 %    Immature Granulocytes 1 0.0 - 5.0 %    Segs Absolute 5.8 1.7 - 8.2 K/UL    Absolute Lymph # 1.3 0.5 - 4.6 K/UL    Absolute Mono # 0.6 0.1 - 1.3 K/UL    Absolute Eos # 0.2 0.0 - 0.8 K/UL    Basophils Absolute 0.0 0.0 - 0.2 K/UL    Absolute Immature Granulocyte 0.0 0.0 - 0.5 K/UL   Basic Metabolic Panel    Collection Time: 09/15/22  3:23 AM   Result Value Ref Range    Sodium 138 136 - 145 mmol/L    Potassium 4.4 3.5 - 5.1 mmol/L    Chloride 103 101 - 110 mmol/L    CO2 32 21 - 32 mmol/L    Anion Gap 3 (L) 4 - 13 mmol/L    Glucose 87 65 - 100 mg/dL    BUN 25 (H) 8 - 23 MG/DL    Creatinine 1.10 0.8 - 1.5 MG/DL    GFR African American >60 >60 ml/min/1.73m2    GFR Non- >60 >60 ml/min/1.73m2    Calcium 9.2 8.3 - 10.4 MG/DL   POCT Glucose    Collection Time: 09/15/22  7:58 AM   Result Value Ref Range    POC Glucose 104 (H) 65 - 100 mg/dL    [facility]: [facility]         Microbiology:    BC x 2 07/30 negative  Disc aspirate 08/01: negative; gram stain with 20-50 WBC/HPF/ no organisms seen  MRSA nasal swab 03/08/22 negative  Studies:    MRI Lumbar Spine (09/06/22): Impression       1. Worsening imaging findings of osteomyelitis discitis at L2-L4. 2. Slight improvement in persistent ventral epidural enhancing phlegmon versus   small abscess at the level of L2-L3.  This superimposed on the preexisting   degenerative changes results in unchanged severe spinal canal narrowing. 3. Bilateral psoas muscle myositis with a new left psoas muscle abscess of the   level of L3-L4 and decrease but persistent left-sided abscess at the level of   L2-L3.            Signed By: Dulce Reed MD     September 15, 2022

## 2022-09-15 NOTE — PROGRESS NOTES
PHYSICAL THERAPY: Daily Note PM   (Link to Caseload Tracking: PT Visit Days : 6  Time In/Out PT Charge Capture  Rehab Caseload Tracker  Orders    Luis Marti is a 65 y.o. male   PRIMARY DIAGNOSIS: Lumbar discitis  Lumbar discitis [M46.46]       Inpatient: Payor: MEDICARE / Plan: MEDICARE PART A AND B / Product Type: *No Product type* /     ASSESSMENT:     REHAB RECOMMENDATIONS:   Recommendation to date pending progress:  Setting:  Short-term Rehab    Equipment:    To Be Determined     ASSESSMENT:  Mr. Matthieu Delacruz was supine in bed on arrival. He is agreeable to PT and plans to go to rehab at discharge. He is having muscle spasms in his right hip area. Rolling left and right to don shorts. Bed mobility with mod A x2. He was able to stand twice with mod A x2 with second time barely clearing the bed. He became very fatigued while sitting and continued to lean forward. Exercises performed while supine in bed. Pt left on his right sidelying with wedge and pillows in place for comfort.       SUBJECTIVE:   Mr. Matthieu Delacruz states, \"I will move when this cramp stops\"    Social/Functional Lives With: Spouse  Type of Home: House  Home Layout: Two level  Home Access: Stairs to enter with rails  Entrance Stairs - Number of Steps: 4  Entrance Stairs - Rails: Left  Bathroom Shower/Tub: Walk-in shower  Bathroom Toilet: Handicap height  Bathroom Equipment: Grab bars in shower, Toilet raiser  Bathroom Accessibility: Accessible  Home Equipment: Hardik Romano, [address] Help From: Family  ADL Assistance: Independent  Homemaking Assistance: Independent  Homemaking Responsibilities: No  Ambulation Assistance: Independent  Transfer Assistance: Independent  Active : Yes  Mode of Transportation: Car  Occupation: Retired  OBJECTIVE:     PAIN: VITALS / O2: PRECAUTION / Ileene Ruddy / Chandu Fany:   Pre Treatment: not rated         Post Treatment:  not rated Vitals        Oxygen    None    RESTRICTIONS/PRECAUTIONS: MOBILITY: I Mod I S SBA CGA Min Mod Max Total  NT x2 Comments:   Bed Mobility    Rolling [] [] [] [] [] [] [x] [] [] [] []    Supine to Sit [] [] [] [] [] [] [x] [] [] [] [x]    Scooting [] [] [] [] [] [] [x] [] [] [] []    Sit to Supine [] [] [] [] [] [] [x] [] [] [] [x]    Transfers    Sit to Stand [] [] [] [] [] [] [x] [] [] [] [x]    Bed to Chair [] [] [] [] [] [] [] [] [] [] []    Stand to Sit [] [] [] [] [] [] [x] [] [] [] [x]     [] [] [] [] [] [] [] [] [] [] []    I=Independent, Mod I=Modified Independent, S=Supervision, SBA=Standby Assistance, CGA=Contact Guard Assistance,   Min=Minimal Assistance, Mod=Moderate Assistance, Max=Maximal Assistance, Total=Total Assistance, NT=Not Tested    BALANCE: Good Fair+ Fair Fair- Poor NT Comments   Sitting Static [] [] [x] [x] [] []    Sitting Dynamic [] [] [] [x] [x] []              Standing Static [] [] [] [] [x] []    Standing Dynamic [] [] [] [] [] []      GAIT: I Mod I S SBA CGA Min Mod Max Total  NT x2 Comments:   Level of Assistance [] [] [] [] [] [] [] [] [] [] []    Distance   feet    DME N/A    Gait Quality N/A    Weightbearing Status      Stairs      I=Independent, Mod I=Modified Independent, S=Supervision, SBA=Standby Assistance, CGA=Contact Guard Assistance,   Min=Minimal Assistance, Mod=Moderate Assistance, Max=Maximal Assistance, Total=Total Assistance, NT=Not Tested    PLAN:   ACUTE PHYSICAL THERAPY GOALS:   (Developed with and agreed upon by patient and/or caregiver.)     (1.) Hyde Pounds  will move from supine to sit and sit to supine  with CONTACT GUARD ASSIST within 7 treatment day(s). (2.) Hyde Pounds will transfer from bed to chair and chair to bed with MINIMAL ASSIST using the least restrictive device within 7 treatment day(s). (3.) Hyde Pounds will ambulate with MINIMAL ASSIST for 50 feet with the least restrictive device within 7 treatment day(s).    (4.) Hyde Pounds will perform standing static and dynamic balance activities x 10 minutes with CONTACT GUARD ASSIST to improve safety within 7 treatment day(s). (5.) Angela Mendosa will perform bilateral lower extremity exercises x 20 min for HEP with INDEPENDENCE to improve strength, endurance, and functional mobility within 7 treatment day(s). FREQUENCY AND DURATION: Daily for duration of hospital stay or until stated goals are met, whichever comes first.    TREATMENT:   TREATMENT:   Co-Treatment PT/OT necessary due to patient's decreased overall endurance/tolerance levels, as well as need for high level skilled assistance to complete functional transfers/mobility and functional tasks  Therapeutic Activity (26 Minutes): Therapeutic activity included Rolling, Supine to Sit, Sit to Supine, Sitting balance , and Standing balance to improve functional Activity tolerance, Balance, and Mobility. Therapeutic Exercise (27 Minutes): Therapeutic exercises noted below to improve functional activity tolerance, AROM, strength, and mobility.      TREATMENT GRID:   Date:   Date:  9/15/22 Date:     Activity/Exercise Parameters Parameters Parameters   Seated TKE X 10     Seated marching      Seated arm raises      Seated punching      Supine AP X 10 20    Heel slides  15 B AA    SAQ  15 B AA    Supine hip abd  15 B AA    Seated row (pulling me toward him)        AFTER TREATMENT PRECAUTIONS: Bed, Bed/Chair Locked, Call light within reach, Needs within reach, and RN notified    INTERDISCIPLINARY COLLABORATION:  RN/ PCT, PT/ PTA, and OT/ KATE    EDUCATION:      TIME IN/OUT:  Time In: 1310  Time Out: [address] [facility]  Minutes: [address], PTA

## 2022-09-15 NOTE — FLOWSHEET NOTE
Patient on room air. Safety measures noted. Will continue to monitor per policy.      09/15/22 0701   Handoff   Communication Given Shift Handoff   Handoff Received From Arie Diaz RN   Handoff Communication Face to Face   Time Handoff Given 2081

## 2022-09-15 NOTE — PROGRESS NOTES
OCCUPATIONAL THERAPY: Daily Note AM   OT Visit Days: 5   Time  OT Charge Capture  Rehab Caseload Tracker  OT Orders    Lelia Jones is a 65 y.o. male   PRIMARY DIAGNOSIS: Lumbar discitis  Lumbar discitis [M46.46]       Inpatient: Payor: MEDICARE / Plan: MEDICARE PART A AND B / Product Type: *No Product type* /     ASSESSMENT:     REHAB RECOMMENDATIONS: CURRENT LEVEL OF FUNCTION:  (Most Recently Demonstrated)   Recommendation to date pending progress:  Setting:  Short-term Rehab    Equipment:    To Be Determined Bathing:  Not Tested  Dressing:  Not Tested  Feeding/Grooming:  Not Tested  Toileting:  Not Tested   Functional Mobility:  Moderate Assist X 2      ASSESSMENT:    Mr. Terri Worrell was supine in bed upon arrival. Pt completed bed mobility with mod A X 2. Pt completed two STS times with mod A X 2 using rolling walker. Pt worked on sitting balance EOB. Continue POC.           SUBJECTIVE:     Mr. Terri Worrell states, \"Hey\"     Social/Functional Lives With: Spouse  Type of Home: House  Home Layout: Two level  Home Access: Stairs to enter with rails  Entrance Stairs - Number of Steps: 4  Entrance Stairs - Rails: Left  Bathroom Shower/Tub: Walk-in shower  Bathroom Toilet: Handicap height  Bathroom Equipment: Grab bars in shower, Toilet raiser  Bathroom Accessibility: Accessible  Home Equipment: Hardik Sharma, [address] Help From: Family  ADL Assistance: Independent  Homemaking Assistance: Independent  Homemaking Responsibilities: No  Ambulation Assistance: Independent  Transfer Assistance: Independent  Active : Yes  Mode of Transportation: Car  Occupation: Retired    OBJECTIVE:     Oziel Kunz / Radha Liz / Rolene Koyanagi: IV    RESTRICTIONS/PRECAUTIONS:           PAIN: Arvil Octave / O2:   Pre Treatment: 4             Post Treatment: 4 Vitals          Oxygen            MOBILITY: I Mod I S SBA CGA Min Mod Max Total  NT x2 Comments:   Bed Mobility    Rolling [] [] [] [] [] [] [] [] [] [] []    Supine to Sit [] [] [] [] [] [] [x] [] [] [] [x]    Scooting [] [] [] [] [] [] [] [] [] [] []    Sit to Supine [] [] [] [] [] [] [x] [] [] [] [x]    Transfers    Sit to Stand [] [] [] [] [] [] [x] [] [] [] [x]    Bed to Chair [] [] [] [] [] [] [] [] [] [] []    Stand to Sit [] [] [] [] [] [] [x] [] [] [] [x]    Tub/Shower [] [] [] [] [] [] [] [] [] [] []     Toilet [] [] [] [] [] [] [] [] [] [] []      [] [] [] [] [] [] [] [] [] [] []    I=Independent, Mod I=Modified Independent, S=Supervision/Setup, SBA=Standby Assistance, CGA=Contact Guard Assistance, Min=Minimal Assistance, Mod=Moderate Assistance, Max=Maximal Assistance, Total=Total Assistance, NT=Not Tested    ACTIVITIES OF DAILY LIVING: I Mod I S SBA CGA Min Mod Max Total NT Comments   BASIC ADLs:              Upper Body   Bathing [] [] [] [] [] [] [] [] [] []    Lower Body Bathing [] [] [] [] [] [] [] [] [] []    Toileting [] [] [] [] [] [] [] [] []     Upper Body Dressing [] [] [] [] [] [] [] [] [] []    Lower Body Dressing [] [] [] [] [] [] [] [] [] []    Feeding [] [] [] [] [] [] [] [] [] []    Grooming [] [] [] [] [] [] [] [] [] []    Personal Device Care [] [] [] [] [] [] [] [] [] []    Functional Mobility [] [] [] [] [] [] [] [] [] []    I=Independent, Mod I=Modified Independent, S=Supervision/Setup, SBA=Standby Assistance, CGA=Contact Guard Assistance, Min=Minimal Assistance, Mod=Moderate Assistance, Max=Maximal Assistance, Total=Total Assistance, NT=Not Tested    BALANCE: Good Fair+ Fair Fair- Poor NT Comments   Sitting Static [] [] [] [x] [] []    Sitting Dynamic [] [] [] [x] [] []              Standing Static [] [] [] [] [] []    Standing Dynamic [] [] [] [] [] []        PLAN:     FREQUENCY/DURATION   OT Plan of Care: 5 times/week for duration of hospital stay or until stated goals are met, whichever comes first.    ACUTE OCCUPATIONAL THERAPY GOALS:   (Developed with and agreed upon by patient and/or caregiver.)    (1.) Abdullahi Holliday  will move from supine to sit and sit to supine  with CONTACT GUARD ASSIST within 7 treatment day(s). (2.) Jean-Pierre Bunkers will transfer from bed to chair and chair to bed with MINIMAL ASSIST using the least restrictive device within 7 treatment day(s). (3.) Jean-Pierre Bunkers will ambulate with MINIMAL ASSIST for 50 feet with the least restrictive device within 7 treatment day(s). (4.) Jean-Pierre Bunkers will perform standing static and dynamic balance activities x 10 minutes with CONTACT GUARD ASSIST to improve safety within 7 treatment day(s). (5.) Jean-Pierre Bunkers will perform bilateral lower extremity exercises x 20 min for HEP with INDEPENDENCE to improve strength, endurance, and functional mobility within 7 treatment day(s). TREATMENT:     TREATMENT:   AM-  Neuromuscular Re-education (53 Minutes): Neuromuscular Re-education included Balance Training, Coordination training, Postural training, and Sitting balance training to improve Balance, Coordination, and Postural Control.     TREATMENT GRID:  N/A    AFTER TREATMENT PRECAUTIONS: Alarm Activated, Bed, Bed/Chair Locked, Call light within reach, Needs within reach, RN notified, and Side rails x3    INTERDISCIPLINARY COLLABORATION:  RN/ PCT, PT/ PTA, and OT/ KATE    EDUCATION:       TOTAL TREATMENT DURATION AND TIME:    Time In: 1310  Time Out: [address]  Minutes: [address] [facility]

## 2022-09-16 ENCOUNTER — TELEPHONE (OUTPATIENT)
Dept: INTERNAL MEDICINE CLINIC | Facility: CLINIC | Age: 77
End: 2022-09-16

## 2022-09-16 VITALS
HEART RATE: 68 BPM | SYSTOLIC BLOOD PRESSURE: 132 MMHG | BODY MASS INDEX: 32.07 KG/M2 | DIASTOLIC BLOOD PRESSURE: 63 MMHG | TEMPERATURE: 98.2 F | WEIGHT: 242 LBS | OXYGEN SATURATION: 90 % | HEIGHT: 73 IN | RESPIRATION RATE: 18 BRPM

## 2022-09-16 LAB
ANION GAP SERPL CALC-SCNC: 4 MMOL/L (ref 4–13)
BACTERIA SPEC CULT: NORMAL
BASOPHILS # BLD: 0.1 K/UL (ref 0–0.2)
BASOPHILS NFR BLD: 1 % (ref 0–2)
BUN SERPL-MCNC: 22 MG/DL (ref 8–23)
CALCIUM SERPL-MCNC: 9.3 MG/DL (ref 8.3–10.4)
CHLORIDE SERPL-SCNC: 102 MMOL/L (ref 101–110)
CO2 SERPL-SCNC: 31 MMOL/L (ref 21–32)
CREAT SERPL-MCNC: 1 MG/DL (ref 0.8–1.5)
DIFFERENTIAL METHOD BLD: ABNORMAL
EOSINOPHIL # BLD: 0.2 K/UL (ref 0–0.8)
EOSINOPHIL NFR BLD: 2 % (ref 0.5–7.8)
ERYTHROCYTE [DISTWIDTH] IN BLOOD BY AUTOMATED COUNT: 16.9 % (ref 11.9–14.6)
GLUCOSE BLD STRIP.AUTO-MCNC: 126 MG/DL (ref 65–100)
GLUCOSE BLD STRIP.AUTO-MCNC: 150 MG/DL (ref 65–100)
GLUCOSE SERPL-MCNC: 118 MG/DL (ref 65–100)
GRAM STN SPEC: NORMAL
HCT VFR BLD AUTO: 27.1 % (ref 41.1–50.3)
HGB BLD-MCNC: 8.5 G/DL (ref 13.6–17.2)
IMM GRANULOCYTES # BLD AUTO: 0 K/UL (ref 0–0.5)
IMM GRANULOCYTES NFR BLD AUTO: 1 % (ref 0–5)
LYMPHOCYTES # BLD: 1.1 K/UL (ref 0.5–4.6)
LYMPHOCYTES NFR BLD: 13 % (ref 13–44)
MCH RBC QN AUTO: 30.1 PG (ref 26.1–32.9)
MCHC RBC AUTO-ENTMCNC: 31.4 G/DL (ref 31.4–35)
MCV RBC AUTO: 96.1 FL (ref 79.6–97.8)
MONOCYTES # BLD: 0.8 K/UL (ref 0.1–1.3)
MONOCYTES NFR BLD: 9 % (ref 4–12)
NEUTS SEG # BLD: 6.3 K/UL (ref 1.7–8.2)
NEUTS SEG NFR BLD: 74 % (ref 43–78)
NRBC # BLD: 0 K/UL (ref 0–0.2)
PLATELET # BLD AUTO: 275 K/UL (ref 150–450)
PMV BLD AUTO: 10.4 FL (ref 9.4–12.3)
POTASSIUM SERPL-SCNC: 4.2 MMOL/L (ref 3.5–5.1)
RBC # BLD AUTO: 2.82 M/UL (ref 4.23–5.6)
SERVICE CMNT-IMP: ABNORMAL
SERVICE CMNT-IMP: ABNORMAL
SERVICE CMNT-IMP: NORMAL
SODIUM SERPL-SCNC: 137 MMOL/L (ref 138–145)
WBC # BLD AUTO: 8.4 K/UL (ref 4.3–11.1)

## 2022-09-16 PROCEDURE — 94660 CPAP INITIATION&MGMT: CPT

## 2022-09-16 PROCEDURE — 36415 COLL VENOUS BLD VENIPUNCTURE: CPT

## 2022-09-16 PROCEDURE — 82962 GLUCOSE BLOOD TEST: CPT

## 2022-09-16 PROCEDURE — 80048 BASIC METABOLIC PNL TOTAL CA: CPT

## 2022-09-16 PROCEDURE — 94760 N-INVAS EAR/PLS OXIMETRY 1: CPT

## 2022-09-16 PROCEDURE — 2580000003 HC RX 258: Performed by: INTERNAL MEDICINE

## 2022-09-16 PROCEDURE — 94640 AIRWAY INHALATION TREATMENT: CPT

## 2022-09-16 PROCEDURE — 6370000000 HC RX 637 (ALT 250 FOR IP): Performed by: INTERNAL MEDICINE

## 2022-09-16 PROCEDURE — 85025 COMPLETE CBC W/AUTO DIFF WBC: CPT

## 2022-09-16 PROCEDURE — 2700000000 HC OXYGEN THERAPY PER DAY

## 2022-09-16 RX ORDER — OXYCODONE HYDROCHLORIDE 5 MG/1
5 TABLET ORAL EVERY 8 HOURS PRN
Qty: 9 TABLET | Refills: 0 | Status: SHIPPED | OUTPATIENT
Start: 2022-09-16 | End: 2022-09-19

## 2022-09-16 RX ORDER — GLIPIZIDE 10 MG/1
10 TABLET, FILM COATED, EXTENDED RELEASE ORAL DAILY
Qty: 30 TABLET | Refills: 0
Start: 2022-09-16

## 2022-09-16 RX ADMIN — SENNOSIDES AND DOCUSATE SODIUM 2 TABLET: 8.6; 5 TABLET ORAL at 08:20

## 2022-09-16 RX ADMIN — GLIPIZIDE 5 MG: 5 TABLET ORAL at 08:23

## 2022-09-16 RX ADMIN — SODIUM CHLORIDE, PRESERVATIVE FREE 10 ML: 5 INJECTION INTRAVENOUS at 08:21

## 2022-09-16 RX ADMIN — CALCITONIN SALMON 1 SPRAY: 200 SPRAY, METERED NASAL at 08:28

## 2022-09-16 RX ADMIN — GUAIFENESIN 1200 MG: 600 TABLET ORAL at 08:20

## 2022-09-16 RX ADMIN — ANTI-FUNGAL POWDER MICONAZOLE NITRATE TALC FREE: 1.42 POWDER TOPICAL at 08:33

## 2022-09-16 RX ADMIN — ALBUTEROL SULFATE 2 PUFF: 90 AEROSOL, METERED RESPIRATORY (INHALATION) at 08:00

## 2022-09-16 RX ADMIN — POLYETHYLENE GLYCOL 3350 17 G: 17 POWDER, FOR SOLUTION ORAL at 08:20

## 2022-09-16 RX ADMIN — CLOPIDOGREL BISULFATE 75 MG: 75 TABLET ORAL at 08:23

## 2022-09-16 RX ADMIN — FERROUS GLUCONATE 324 MG: 324 TABLET ORAL at 08:24

## 2022-09-16 RX ADMIN — GABAPENTIN 300 MG: 300 CAPSULE ORAL at 08:20

## 2022-09-16 RX ADMIN — GABAPENTIN 300 MG: 300 CAPSULE ORAL at 13:55

## 2022-09-16 RX ADMIN — FLUTICASONE PROPIONATE 1 SPRAY: 50 SPRAY, METERED NASAL at 08:34

## 2022-09-16 RX ADMIN — CARVEDILOL 6.25 MG: 3.12 TABLET, FILM COATED ORAL at 08:20

## 2022-09-16 RX ADMIN — ACETAMINOPHEN 650 MG: 325 TABLET ORAL at 13:55

## 2022-09-16 RX ADMIN — PANTOPRAZOLE SODIUM 40 MG: 40 TABLET, DELAYED RELEASE ORAL at 06:17

## 2022-09-16 RX ADMIN — NAPROXEN 500 MG: 250 TABLET ORAL at 08:20

## 2022-09-16 RX ADMIN — ACETAMINOPHEN 650 MG: 325 TABLET ORAL at 08:20

## 2022-09-16 RX ADMIN — FUROSEMIDE 40 MG: 40 TABLET ORAL at 08:28

## 2022-09-16 RX ADMIN — AMIODARONE HYDROCHLORIDE 200 MG: 200 TABLET ORAL at 08:24

## 2022-09-16 NOTE — FLOWSHEET NOTE
Patient on cpap with 2 LPM. Safety measures noted. Will continue to monitor per policy.      09/16/22 0655   Handoff   Communication Given Shift Handoff   Handoff Received From Radha Bowman RN   Handoff Communication Face to Face   Time Handoff Given 4525

## 2022-09-16 NOTE — DISCHARGE SUMMARY
Date of Admission: 9/7/2022  Date of Discharge: 9/16/2022    Discharge Diagnoses:  Principal Problem:    Lumbar discitis  Active Problems:    ICD (implantable cardioverter-defibrillator) in place    Physical debility    Psoas muscle abscess (HCC)    Atrial fibrillation (HCC)    HTN (hypertension)    Pulmonary emphysema (HCC)    Severe obesity (BMI 35.0-39. 9) with comorbidity ([facility]    Diabetic neuropathy ([facility]    Sleep apnea    Ventricular tachycardia (HCC)    PAD (peripheral artery disease) ([facility]  Resolved Problems:    * No resolved hospital problems. *       Discharge Medications:  Current Discharge Medication List        START taking these medications    Details   oxyCODONE (ROXICODONE) 5 MG immediate release tablet Take 1 tablet by mouth every 8 hours as needed for Pain for up to 3 days. Qty: 9 tablet, Refills: 0    Comments: Reduce doses taken as pain becomes manageable  Associated Diagnoses: Psoas muscle abscess ([facility]           CONTINUE these medications which have CHANGED    Details   glipiZIDE (GLUCOTROL XL) 10 MG extended release tablet Take 1 tablet by mouth daily  Qty: 30 tablet, Refills: 0           CONTINUE these medications which have NOT CHANGED    Details   furosemide (LASIX) 20 MG tablet Take 1 tablet by mouth in the morning. Qty: 60 tablet, Refills: 3      tamsulosin (FLOMAX) 0.4 MG capsule Take 1 capsule by mouth in the morning. Qty: 30 capsule, Refills: 3      pantoprazole (PROTONIX) 40 MG tablet Take 1 tablet by mouth in the morning and 1 tablet in the evening. Take before meals.   Qty: 30 tablet, Refills: 3      OLANZapine zydis (ZYPREXA) 5 MG disintegrating tablet Take 1 tablet by mouth nightly  Qty: 30 tablet, Refills: 3      rosuvastatin (CRESTOR) 10 MG tablet TAKE 1 TABLET BY MOUTH EVERY DAY  Qty: 90 tablet, Refills: 0    Comments: * * N O T I C E * * Last quantity doesn't match original quantity      tiZANidine (ZANAFLEX) 2 MG tablet Take 1 tablet by mouth every 8 hours as needed (muscle spasms)  Qty: 30 tablet, Refills: 1      fluticasone-salmeterol (ADVAIR) 250-50 MCG/ACT AEPB diskus inhaler Inhale 1 puff into the lungs in the morning and at bedtime      calcitonin (MIACALCIN) 200 UNIT/ACT nasal spray 1 spray by Nasal route daily  Qty: 1 each, Refills: 1    Associated Diagnoses: Compression fracture of L2 vertebra, sequela      albuterol sulfate  (90 Base) MCG/ACT inhaler USE 2 PUFFS BY INHALATION 4 TIMES DAILY AS NEEDED FOR WHEEZING OR SHORTNESS OF BREATH.      amiodarone (CORDARONE) 200 MG tablet Take 200 mg by mouth 2 times daily      apixaban (ELIQUIS) 5 MG TABS tablet Take 5 mg by mouth every 12 hours      ascorbic acid (VITAMIN C) 500 MG tablet Take 500 mg by mouth      carvedilol (COREG) 25 MG tablet Take 25 mg by mouth 2 times daily (with meals)      Cholecalciferol 50 MCG (2000 UT) TABS Take 2,000 Units by mouth      clopidogrel (PLAVIX) 75 MG tablet Take 75 mg by mouth daily      fluticasone (FLONASE) 50 MCG/ACT nasal spray USE 1 OR 2 SPRAYS IN EACH NOSTRIL EVERY DAY.       glucosamine-chondroitin 750-600 MG TABS tablet Take by mouth 2 times daily      guaiFENesin 1200 MG TB12 Take 1,200 mg by mouth 2 times daily as needed      latanoprost (XALATAN) 0.005 % ophthalmic solution Apply 1 drop to eye      magnesium oxide (MAG-OX) 400 (240 Mg) MG tablet TAKE 1 TABLET BY MOUTH EVERY DAY      montelukast (SINGULAIR) 10 MG tablet TAKE 1 TABLET BY MOUTH EVERY DAY AT BEDTIME      nitroGLYCERIN (NITROSTAT) 0.4 MG SL tablet Place 0.4 mg under the tongue      polyethylene glycol (GLYCOLAX) 17 GM/SCOOP powder Take 17 g by mouth daily      SITagliptin (JANUVIA) 100 MG tablet TAKE 1 TABLET BY MOUTH EVERY DAY      valsartan (DIOVAN) 320 MG tablet Take 320 mg by mouth daily           STOP taking these medications       amLODIPine (NORVASC) 5 MG tablet Comments:   Reason for Stopping:         QUEtiapine (SEROQUEL) 25 MG tablet Comments:   Reason for Stopping:         metFORMIN (GLUCOPHAGE) 500 MG tablet Comments:   Reason for Stopping:               Pending Labs:  None    Follow-up (including scheduled tests):  PMD    History of Present Illness:  65 y.o. male with a past medical history significant for ischemic cardiomyopathy with ICD/pacemaker, EF of 30 to 35%, A. fib on Eliquis, diabetes, obesity, peripheral artery disease anemia, COPD with asthma, coronary artery disease, obstructive sleep apnea on CPAP with recent L2 L4 kyphoplasty who was recently charged from [address] [place] [address] August 8, 2022 to [facility] after being admitted for UTI worsening weakness status post surgery with several falls. MRI of the lumbar spine and cervical spine were done and he was found to have a lumbar spine epidural abscess and symptoms abscess. His CRP was elevated at 12.4. He had noted osteomyelitis of the lower spine. His oral anticoagulation was held and he was placed on a heparin drip. He was also placed on vancomycin and ceftriaxone after IR drain abscess. He was followed by infectious disease and treated for 6 weeks with Vanco and Rocephin. He developed altered mental status. This was thought to be due to metabolic cause. Unfortunately he started having increasing complaints of back pain confusion. He was transferred to [facility] for ongoing therapy and antibiotic needs. Cardiology was consulted and his ICD was interrogated and found to be functioning well. A Tomas was placed due to his urinary retention. Urology was consulted. He had a complete altered mental status work-up and it was thought he had acute encephalopathy secondary to especially opiates and antipsychotics. He has been weaning off Zyprexa with plans to continue IV antibiotics for total of 6 weeks with estimated end of therapy 9/20/2022. He was transferred to inpatient rehab floor on August 26, 2022.   He was continued on antibiotics he continued to have lower back pain and has been poorly participative in therapy over the last few days. MRI of the spine done today showed an apparent worsening osteomyelitis and psoas muscle abscess. It also showed compression of the cauda equina nerve roots at multiple levels in the lumbar spine as before. Multilevel degenerative disc disease and facet arthropathy. Bilateral psoas muscle myositis with new left psoas muscle abscess at the level of L3-L4. Because of the abscesses ID was sultan. And recommended stopping all antibiotics at this time and getting IR to aspirate the abscess for culture likely on Monday of next week. Dr. Mirtha Morales was also consulted and felt no surgical role indicated at this time and that his degenerative stenosis can be addressed once he is infection free.     Past Medical History:  Past Medical History:   Diagnosis Date    Acute respiratory failure with hypoxia ([address] 10/23/2014    MINI (acute kidney injury) ([address] 09/15/2014    MINI (acute kidney injury) ([address]     MINI (acute kidney injury) ([address]     Allergic rhinitis 11/10/2015    Asthma 11/10/2015    Benign essential hypertension 11/10/2015    Benign neoplasm of colon 11/10/2015    CAD (coronary artery disease) 11/10/2015    CAD (coronary artery disease) 1998, 1999    mix2, 3 stents [id number]    CAP (community acquired pneumonia) 12/31/2020    Cardiomyopathy ([address] 38/63/4058    Complicated wound infection 11/10/2015    COPD (chronic obstructive pulmonary disease) with emphysema ([address] 11/10/2015    COPD exacerbation ([address] 10/12/2011    COVID-19 12/31/2020    Diabetes mellitus type 2, controlled ([address] 11/10/2015    Diabetic neuropathy ([address] 11/10/2015    Disruption of wound, unspecified 10/09/2014    Encounter for long-term (current) use of other high-risk medications 11/10/2015    Extremity atherosclerosis with intermittent claudication ([address] 11/10/2015    H/O endarterectomy 11/10/2015    Hiatal hernia 11/10/2015    History of 2019 novel coronavirus disease (COVID-19)     12/31/2020-    Hyperglycemia due to type 1 diabetes mellitus ([facility] 11/10/2015    Hyperlipidemia 11/10/2015    ICD (implantable cardioverter-defibrillator) in place 06/16/2022    Monomorphic ventricular tachycardia ([facility] 12/31/2020    Myocardial infarction [facility]) 1999    Myocardial infarction ([facility] 11/10/2015    Obesity 11/10/2015    Orthostatic hypotension 11/10/2015    Osteoarthritis 11/10/2015    Peripheral vascular disease ([facility] 11/10/2015    PNA (pneumonia) 02/08/2019    PVC (premature ventricular contraction)     Rash 76/15/3080    Seroma complicating a procedure 10/02/2014    Sleep apnea     cpap    Toe laceration     Type 2 diabetes with nephropathy ([facility] [facility]     Uncontrolled type 2 diabetes mellitus ([facility] 11/10/2015    Vertiginous syndromes and other disorders of vestibular system 11/10/2015       Allergies: Allergies   Allergen Reactions    Azithromycin Hives    Oxaprozin Other (See Comments)     ELEVATED BLOOD PRESSURE       Hospital Course:  65 y.o. male with a past medical history significant for ischemic cardiomyopathy with ICD/pacemaker, EF of 30 to 35%, A. fib on Eliquis, diabetes, obesity, peripheral artery disease anemia, COPD with asthma, coronary artery disease, obstructive sleep apnea on CPAP with recent L2 L4 kyphoplasty who was recently discharged from [facility] August 8, 2022 to [facility] after being admitted for UTI worsening weakness status post surgery with several falls. MRI of the lumbar spine and cervical spine were done and he was found to have a lumbar spine epidural abscess and symptoms abscess     1.   Lumbar spine and psoas abscess with lumbar spine osteomyelitis and complicated wound infection  -Ortho consulted and no surgical intervention   -Initially on ceftriaxone and vancomycin until 9/9/2022  -ID consulted  -As per ID recommendation hold antibiotics   -IR consulted  -S/p biopsy and wash out by IR  -Biopsy cultures pending - NGTD (48h)  -Improved symptomatology  -Hemodynamically stable on discharge  -Follow cultures outpatient  -Outpatient follow-up     Procedures:  Biopsy    Discharge Day Information:  Follow with PMD    Diet: Cardiac    Activity: As tolerated    Discharge Physical Exam:  General: Alert, oriented, NAD  HEENT: NC/AT, EOM are intact  Neck: supple, no JVD  Cardiovascular: RRR, S1, S2, no murmurs  Respiratory: Lungs are clear, no wheezes or rales  Abdomen: Soft, NT, ND  Back: No CVA tenderness, no paraspinal tenderness  Extremities: LE without pedal edema, no erythema  Neuro: A&O, CN are intact, no focal deficits  Skin: no rash or ulcers  Psych: good mood and affect    Recent Results (from the past 24 hour(s))   POCT Glucose    Collection Time: 09/15/22  4:01 PM   Result Value Ref Range    POC Glucose 124 (H) 65 - 100 mg/dL    Performed by: Saul    POCT Glucose    Collection Time: 09/15/22  8:41 PM   Result Value Ref Range    POC Glucose 183 (H) 65 - 100 mg/dL    Performed by: Secret Spacegarrick    CBC with Auto Differential    Collection Time: 09/16/22  4:42 AM   Result Value Ref Range    WBC 8.4 4.3 - 11.1 K/uL    RBC 2.82 (L) 4.23 - 5.6 M/uL    Hemoglobin 8.5 (L) 13.6 - 17.2 g/dL    Hematocrit 27.1 (L) 41.1 - 50.3 %    MCV 96.1 79.6 - 97.8 FL    MCH 30.1 26.1 - 32.9 PG    MCHC 31.4 31.4 - 35.0 g/dL    RDW 16.9 (H) 11.9 - 14.6 %    Platelets 770 903 - 002 K/uL    MPV 10.4 9.4 - 12.3 FL    nRBC 0.00 0.0 - 0.2 K/uL    Differential Type AUTOMATED      Seg Neutrophils 74 43 - 78 %    Lymphocytes 13 13 - 44 %    Monocytes 9 4.0 - 12.0 %    Eosinophils % 2 0.5 - 7.8 %    Basophils 1 0.0 - 2.0 %    Immature Granulocytes 1 0.0 - 5.0 %    Segs Absolute 6.3 1.7 - 8.2 K/UL    Absolute Lymph # 1.1 0.5 - 4.6 K/UL    Absolute Mono # 0.8 0.1 - 1.3 K/UL    Absolute Eos # 0.2 0.0 - 0.8 K/UL    Basophils Absolute 0.1 0.0 - 0.2 K/UL    Absolute Immature Granulocyte 0.0 0.0 - 0.5 K/UL   Basic Metabolic Panel    Collection Time: 09/16/22  4:42 AM   Result Value Ref Range    Sodium 137 (L) 138 - 145 mmol/L    Potassium 4.2 3.5 - 5.1 mmol/L    Chloride 102 101 - 110 mmol/L    CO2 31 21 - 32 mmol/L    Anion Gap 4 4 - 13 mmol/L    Glucose 118 (H) 65 - 100 mg/dL    BUN 22 8 - 23 MG/DL    Creatinine 1.00 0.8 - 1.5 MG/DL    GFR African American >60 >60 ml/min/1.73m2    GFR Non- >60 >60 ml/min/1.73m2    Calcium 9.3 8.3 - 10.4 MG/DL   POCT Glucose    Collection Time: 09/16/22  7:28 AM   Result Value Ref Range    POC Glucose 126 (H) 65 - 100 mg/dL    Performed by: RadhaestinyPCT    POCT Glucose    Collection Time: 09/16/22 11:23 AM   Result Value Ref Range    POC Glucose 150 (H) 65 - 100 mg/dL    Performed by: AlyPCT         CT GUIDED FNA   Final Result   CT-guided Needle aspiration and core biopsy of of the phlegmon   adjacent to the left side of L3-L4 disc and L4 vertebral body. The 1st needle   placement adjacent to the L4 vertebral body returned no fluid, therefore a core   biopsy was obtained. The 2nd needle placement adjacent to the L3-L4 vertebral   body initially returned no fluid, and therefore a few cc of inoculum saline was   administered and then aspirated. Plan:  Specimen(s) sent to the Microbiology department for evaluation. One hour   bedrest.  Resume Plavix tomorrow. Resume Eliquis in 48 hours. _______________________________________________________________   Technique: All CT scans at this facility are performed using dose   reduction/dose modulation techniques, as appropriate to the performed exam,   including the following:  Automated Exposure Control; Adjustment of the MA   and/or kF according to patient size (this includes techniques or standardized   protocols for targeted exams where dose is matched to indication/reason for   exam); and Use of Iterative Reconstruction Technique.       PROCEDURE SUMMARY:   - Percutaneous CT-guided Coaxial Core Needle Biopsy and Fluid Aspiration      PROCEDURE DETAILS:      Pre-procedure   Reference imaging for biopsy target: MRI 9/6/2022, 7/29/2022, 6/2/2022 as well   as CT 7/27/2022      Consent:  Informed written and oral consent for the procedure was obtained after   explanation of risks (including, but not limitted to:  hemorrhage, infection,   visceral injury, nondiagnostic biopsy) benefits and alternatives. The patient's   questions were answered to their satisfaction. They stated understanding and   requested that we proceed. Final verification:  A time-out identifying the patient and planned procedure   was performed prior to this procedure. Preparation: (MIPS) Maximal sterile barrier technique (including:  cap, mask,   sterile gown, sterile gloves, sterile sheet, hand hygiene, and cutaneous   antisepsis) was used. Anesthesia/sedation   Level of anesthesia/sedation: Moderate sedation (conscious sedation)   Anesthesia/sedation administered by: Independent trained observer under   attending supervision with continuous monitoring of the patient?s level of   consciousness and physiologic status   Total intra-service sedation time (minutes): 27      Imaging prior to biopsy   The patient was positioned prone. Initial imaging was performed using   noncontrast CT. Biopsy target:   - Maximal diameter (cm): 0.7 -    Location: soft tissue density to the left of the L4 vertebral body. Other findings: Less distinct soft tissue density to the left of the L3-L4 disc   space. Biopsy    Local anesthesia was administered. Under CT guidance, the coaxial biopsy system   was advanced to the target and aspiration was attempted but no fluid was   returned. Therefore, through the coaxial needle, an 18-gauge core biopsy was   obtained and then placed into a sterile container. Coaxial needle: 17 gauge   Core needle biopsy device: [employer]   Core needle size: 18 gauge   Number of core specimens: 1      Attention was then turned to the subtle soft tissue density adjacent to the   L3-L4 disc.   The coaxial needle was reassembled and then redirected using   intermittent CT scan imaging to this 2nd soft tissue density. The 17-gauge   coaxial needle was aspirated, returning no fluid. Therefore, 3 cc of inoculum   saline was administered and then aspirated as specimen. Large bore needle aspiration device: [employer]   Fine needle size: 17-gauge   Number of FNA specimens: 1      Needle removal   The biopsy needle was removed and a sterile dressing was applied. Tract embolization: None      Imaging following biopsy   Immediate post-biopsy imaging was not performed. Imaging modality: Not applicable. Post-biopsy imaging findings: Not applicable      Contrast   Contrast agent: None   Contrast volume (mL): 0      Radiation Dose   CT dose length product (mGy-cm):  957.09       Additional Details   Additional description of procedure: None   Equipment details: None   Specimens removed: Microbiology   Estimated blood loss (mL): Less than 10   Standardized report: [email or web address]      Attestation   Signer name: Carri Cabral M.D. I attest that I personally performed the entire procedure. I reviewed the stored   images and agree with the report as written.               Condition: Improved    Disposition: Short-term rehab    Consultants During This Hospitalization: Ortho, ID, infectious disease          Spent 32 minutes on discharge services

## 2022-09-16 NOTE — CARE COORDINATION
MSN, Cm:  patient to be discharged today to [facility] for rehab services. Patient and family agree with this discharge plan. [facility] has met all milestones for this admission. [facility] to transport patient to facility. 09/16/22 1202   Service Assessment   Patient Orientation Alert and [place] At/After Discharge   Transition of Care Consult (CM Consult) SNF  ([facility])   Partner SNF No   Reason Why Partner SNF Not Chosen Location   Mode of Transport at Discharge BLS  ([address] Transport Time of Discharge 1630   Confirm Follow Up Transport [facility]   Condition of Participation: Discharge Planning   The Plan for Transition of Care is related to the following treatment goals: Short term rehab to regain strenght back to baseline   The Patient and/or Patient Representative was provided with a Choice of Provider? Patient;Patient Representative   Name of the Patient Representative who was provided with the Choice of Provider and agrees with the Discharge Plan? Andres Manolo - Wife   The Patient and/Or Patient Representative agree with the Discharge Plan? Yes   Freedom of Choice list was provided with basic dialogue that supports the patient's individualized plan of care/goals, treatment preferences, and shares the quality data associated with the providers?   Yes

## 2022-09-16 NOTE — PROGRESS NOTES
Discharge - REPORT:    Verbal report given to Edelmira Ovalle RN on Abdullahi Holliday  being discharged to [facility] for routine progression of patient care       Report consisted of patient's Situation, Background, Assessment and   Recommendations(SBAR). Information from the following report(s) Nurse Handoff Report, Adult Overview, Intake/Output, MAR, and Recent Results was reviewed with the receiving nurse. Opportunity for questions and clarification was provided. Patient to transported with ambulance service. Transport scheduled for 1630.

## 2022-09-16 NOTE — PROGRESS NOTES
Infectious Disease Progress Note      Today's Date: 9/16/2022   Admit Date: 9/7/2022    Impression:   L2-4 Discitis, culture negative: IV Vanc/ceftriaxone x 39 days; s/p repeat disc aspiration 09/14, several cultures sent and are negative at 48  hours; gram stain with NOS    Plan:     Given negative cultures now would not recommend any further systemic antibiotics; patient received 39 days Vanc/ceftriaxone for abnormal MRI. Would need further evidence of infection other than MRI before I would consider re-initiation of antibotics  Consider MRI right hip although I believe pain radiating from lumbar vertebrae; if only to assure wife and patient nothing wrong with hip  No other recommendations; will sign off. Please call again if we can be of further service. Anti-infectives:   Vancomycin (07/31-09/07)  Ceftriaxone (07/31-09/07)    Subjective: Interval Events: Shooting pains less frequent but more intense; per wife patient to be transferred to [facility] today at 1600      Patient is a 65 y.o. male seen by ID in consult from 07/30-08/05 when he presented with back pain and weakness on 07/27 following L2-L4 kyphoplasty on 07/20/22. MRI done revealed osteomyelitis. BC and aspirate were both negative and patient treated empirically with IV Vancomycin and Ceftriaxone for 42 days with EOT 09/09/22. He was transferred to [facility] on 08/08 and continued antibiotics; per patient and wife he was walking with assistance. He was transferred to rehab on 08/26/22. Around that time he developed a sharp hip pain radiating from his left to right hip and gradually was became unable to walk. A repeat MRI done 09/06 revealed worsening finding of osteo at L2-L4 and bilateral psoas abscess with new left psoas abscess at L2-3 level. Marie Crystal Per wife at bedside, they do not want Dr. Hermann Lundberg to operate anymore. Last APRs: Sed rate 36 ; CRP 3.0 on 08/19.       Allergies   Allergen Reactions    Azithromycin Hives    Oxaprozin Other (See Comments)     ELEVATED BLOOD PRESSURE        Review of Systems:  Comprehensive ROS negative other than mentioned in HPI    Objective:   Blood pressure 132/63, pulse 68, temperature 98.2 °F (36.8 °C), temperature source Oral, resp. rate 18, height 6' 1\" (1.854 m), weight 242 lb (109.8 kg), SpO2 90 %. Visit Vitals  /63   Pulse 68   Temp 98.2 °F (36.8 °C) (Oral)   Resp 18   Ht 6' 1\" (1.854 m)   Wt 242 lb (109.8 kg)   SpO2 90%   BMI 31.93 kg/m²     Temp (24hrs), Av.3 °F (36.8 °C), Min:98.1 °F (36.7 °C), Max:98.6 °F (37 °C)       Exam:    General:  Alert, cooperative, morbidly obese, very debilitated and needs help rolling in the bed   Eyes:  Sclera anicteric. Pupils equally round and reactive to light. Mouth/Throat: Mucous membranes normal, oral pharynx clear   Neck: Supple   Lungs:   Clear to auscultation bilaterally, good effort   CV:  Regular rate and rhythm,no murmur, click, rub or gallop   Abdomen:   Soft, non-tender. bowel sounds normal. non-distended   Extremities: No cyanosis or edema   Skin: Skin color, texture, turgor normal. no acute rash or lesions   Lymph nodes:    Musculoskeletal:  No deformity; unable to palpate spine   Lines/Devices:  :  Right midline, nontender, no erythema   Psych: Alert and oriented, normal mood affect given the setting       CBC:  Recent Labs     09/15/22  0323 22  0442   WBC 8.0 8.4   HGB 8.9* 8.5*   HCT 29.4* 27.1*    275         BMP:  Recent Labs     09/15/22  0323 22  0442   BUN 25* 22    137*   K 4.4 4.2    102   CO2 32 31         LFTS:  No results for input(s): ALT, TP, ALB in the last 72 hours.     Invalid input(s): TBILI, SGOT, AP      Data Review:   Recent Results (from the past 24 hour(s))   POCT Glucose    Collection Time: 09/15/22  4:01 PM   Result Value Ref Range    POC Glucose 124 (H) 65 - 100 mg/dL    Performed by: Saul    POCT Glucose    Collection Time: 09/15/22  8:41 PM   Result Value Ref Range    POC Glucose 183 (H) 65 - 100 mg/dL    Performed by: Jag    CBC with Auto Differential    Collection Time: 09/16/22  4:42 AM   Result Value Ref Range    WBC 8.4 4.3 - 11.1 K/uL    RBC 2.82 (L) 4.23 - 5.6 M/uL    Hemoglobin 8.5 (L) 13.6 - 17.2 g/dL    Hematocrit 27.1 (L) 41.1 - 50.3 %    MCV 96.1 79.6 - 97.8 FL    MCH 30.1 26.1 - 32.9 PG    MCHC 31.4 31.4 - 35.0 g/dL    RDW 16.9 (H) 11.9 - 14.6 %    Platelets 064 579 - 950 K/uL    MPV 10.4 9.4 - 12.3 FL    nRBC 0.00 0.0 - 0.2 K/uL    Differential Type AUTOMATED      Seg Neutrophils 74 43 - 78 %    Lymphocytes 13 13 - 44 %    Monocytes 9 4.0 - 12.0 %    Eosinophils % 2 0.5 - 7.8 %    Basophils 1 0.0 - 2.0 %    Immature Granulocytes 1 0.0 - 5.0 %    Segs Absolute 6.3 1.7 - 8.2 K/UL    Absolute Lymph # 1.1 0.5 - 4.6 K/UL    Absolute Mono # 0.8 0.1 - 1.3 K/UL    Absolute Eos # 0.2 0.0 - 0.8 K/UL    Basophils Absolute 0.1 0.0 - 0.2 K/UL    Absolute Immature Granulocyte 0.0 0.0 - 0.5 K/UL   Basic Metabolic Panel    Collection Time: 09/16/22  4:42 AM   Result Value Ref Range    Sodium 137 (L) 138 - 145 mmol/L    Potassium 4.2 3.5 - 5.1 mmol/L    Chloride 102 101 - 110 mmol/L    CO2 31 21 - 32 mmol/L    Anion Gap 4 4 - 13 mmol/L    Glucose 118 (H) 65 - 100 mg/dL    BUN 22 8 - 23 MG/DL    Creatinine 1.00 0.8 - 1.5 MG/DL    GFR African American >60 >60 ml/min/1.73m2    GFR Non- >60 >60 ml/min/1.73m2    Calcium 9.3 8.3 - 10.4 MG/DL   POCT Glucose    Collection Time: 09/16/22  7:28 AM   Result Value Ref Range    POC Glucose 126 (H) 65 - 100 mg/dL    Performed by: [employer]    POCT Glucose    Collection Time: 09/16/22 11:23 AM   Result Value Ref Range    POC Glucose 150 (H) 65 - 100 mg/dL    Performed by: [employer]         Microbiology:    BC x 2 07/30 negative  Disc aspirate 08/01: negative; gram stain with 20-50 WBC/HPF/ no organisms seen  MRSA nasal swab 03/08/22 negative  Studies:    MRI Lumbar Spine (09/06/22): Impression       1. Worsening imaging findings of osteomyelitis discitis at L2-L4. 2. Slight improvement in persistent ventral epidural enhancing phlegmon versus   small abscess at the level of L2-L3. This superimposed on the preexisting   degenerative changes results in unchanged severe spinal canal narrowing. 3. Bilateral psoas muscle myositis with a new left psoas muscle abscess of the   level of L3-L4 and decrease but persistent left-sided abscess at the level of   L2-L3.            Signed By: Clyde Wellington MD     September 16, 2022

## 2022-09-16 NOTE — PROGRESS NOTES
Pt is sleeping in bed at this time. Pt is on CPAP at this time. Pt even and unlabored respirations. Pt has safety measures in place.  Report given to [facility]

## 2022-09-16 NOTE — PROGRESS NOTES
Attempted to call report to [facility]. Name and number left with Marya Kyle. Nurse, Sherri Mccoy, will call for report.

## 2022-09-16 NOTE — PROGRESS NOTES
09/16/22 0035   NIV Type   $NIV $Daily Charge   Skin Assessment Clean, dry, & intact   NIV Started/Stopped On   Equipment Type Home cpap   Mode Auto-PAP   Mask Type Nasal pillows   Mask Size Large   Settings/Measurements   CPAP/EPAP   (AutoSet)   Resp 20   O2 Flow Rate (L/min) 4 L/min   Comfort Level Good   Using Accessory Muscles No   SpO2 94   Patient's Home Machine Yes (type/vendor)   Electrical Safety Check Performed Yes     Pt placed on home CPAP with 4 L/min bled in. Pt sating 94% and is in no respiratory distress at this time.

## 2022-09-19 ENCOUNTER — CARE COORDINATION (OUTPATIENT)
Dept: CARE COORDINATION | Facility: CLINIC | Age: 77
End: 2022-09-19

## 2022-09-19 NOTE — PROGRESS NOTES
Physician Progress Note      PATIENT:               Roxanne Brizuela  CSN #:                  [id number]  :                       1945  ADMIT DATE:       2022 7:22 PM  [address] DATE:        2022 3:54 PM  RESPONDING  PROVIDER #:        Livia Diaz MD          QUERY TEXT:    Patient admitted with lumbar spine epidural abscess, psoas abscess,   osteomyelitis of the lower spine, noted to have atrial fibrillation. If   possible, please document in progress notes and discharge summary further   specificity regarding the type of atrial fibrillation: The medical record reflects the following:  Risk Factors:77 y.o. male with a past medical history significant for ischemic   cardiomyopathy with ICD/pacemaker, EF of 30 to 35%, A. fib on Eliquis,   diabetes, obesity, peripheral artery disease anemia, COPD with asthma,   coronary artery disease, obstructive sleep apnea on CPAP with recent L2 L4   kyphoplasty  Clinical Indicators: Noted documentation of A Fib  Treatment: Eliquis    Chronic: nonspecific term that could be referring to paroxysmal, persistent,   or permanent  Longstanding persistent: persistent and continuous, lasting > 1 year. Paroxysmal - self-terminating or intermittent; resolves with or without   intervention within 7 days of onset; may recur with various frequency. Persistent - Fails to terminate within 7 days; Often requires meds or   cardioversion to restore to NSR. Permanent - longstanding & persistent; Medication has been ineffective in   restoring NSR &/or cardioversion is contraindicated    Definitions per MS-DRG Training Guide and Quick Reference Guide, Lowell Her[facility]is [facility] 112 5   Diseases and Disorders of the Circulatory System; 2019; [employer]. Software content   from the [employer]? Advanced CDI Transformation Program    Thank you,  Jeanine Zurita RN, BSN, ANDREY [email or web address]  .   Options provided:  -- Paroxysmal Atrial Fibrillation  -- Longstanding Persistent Atrial Fibrillation  -- Permanent Atrial Fibrillation  -- Persistent Atrial Fibrillation  -- Chronic Atrial Fibrillation, unspecified  -- Other - I will add my own diagnosis  -- Disagree - Not applicable / Not valid  -- Disagree - Clinically unable to determine / Unknown  -- Refer to Clinical Documentation Reviewer    PROVIDER RESPONSE TEXT:    This patient has unspecified chronic atrial fibrillation.     Query created by: Rosemary Romero on 9/13/2022 4:30 PM      Electronically signed by:  Tobias Sabillon MD 9/19/2022 1:52 PM

## 2022-09-19 NOTE — TELEPHONE ENCOUNTER
The patient was contacted today to confirm a follow up appointment for Transition Care Management. The patient has good community support and has transitioned into their community setting. The patient will keep their scheduled appointment.

## 2022-09-27 ENCOUNTER — TELEPHONE (OUTPATIENT)
Dept: INTERNAL MEDICINE CLINIC | Facility: CLINIC | Age: 77
End: 2022-09-27

## 2022-09-27 NOTE — TELEPHONE ENCOUNTER
----- Message from [place] Sukumar sent at 9/26/2022  4:06 PM EDT -----  Subject: Message to Provider    QUESTIONS  Information for Provider? Patient wife Dannie Farnsworth called and  is in the   hospital ([facility] since 7/27. Now he is in rehab - [facility] possibly getting out next week not sure. She will call back to make   a f/up appt. She stated that she got a letter about being dismissed from   the practice. She thought the office knew he was in the hospital.  ---------------------------------------------------------------------------  --------------  [phone] "[employer]  [phone]; OK to leave message on voicemail  ---------------------------------------------------------------------------  --------------  SCRIPT ANSWERS  Relationship to Patient? Other  Representative Name? Liz Richardson - wife  Is the Representative on the appropriate HIPAA document in Epic?  Yes

## 2022-09-27 NOTE — TELEPHONE ENCOUNTER
I did not dismiss him. Please correct this and apologize to her. So sorry about this. Thanks.   Dung Arambula

## 2022-09-28 ENCOUNTER — TELEPHONE (OUTPATIENT)
Dept: INTERNAL MEDICINE CLINIC | Facility: CLINIC | Age: 77
End: 2022-09-28

## 2022-09-28 NOTE — TELEPHONE ENCOUNTER
Patient wife called. I apologized for the miscomunication and made an appt. For TCM    D/C DATE: 10/3    D/C FROM: [facility]    D/C TO: Home    PHONE NUMBER TO REACH PATIENT: [phone]    F/U APPT DATE & TIME: 10/4/22 10:00    Wife needed soon appt due her Dtr still being in town to help her.

## 2022-10-03 ENCOUNTER — CARE COORDINATION (OUTPATIENT)
Dept: CARE COORDINATION | Facility: CLINIC | Age: 77
End: 2022-10-03

## 2022-10-03 NOTE — CARE COORDINATION
[facility] Care Transitions Initial Follow Up Call    Call within 2 business days of discharge: Yes, patient d/c from [facility] 10/3/2022    LPN Care Coordinator contacted the family by telephone to perform post hospital discharge assessment. Verified name and  with family as identifiers. Provided introduction to self, and explanation of the LPN Care Coordinator role. Patient: Tena Chowdhury Patient : 1945   MRN: [id number]  Reason for Admission: Lumbar discitis  Discharge Date: 22 RARS: Readmission Risk Score: 26.2      Last Discharge [address]       Date Complaint Diagnosis Description Type Department Provider    22  Psoas muscle abscess [facility]) Admission (Discharged) Nemesio Duran MD            Was this an external facility discharge? No Discharge Facility: Patient d/c from [facility] to [facility] d/c 10/3/2022    Challenges to be reviewed by the provider   Additional needs identified to be addressed with provider: No  none               Method of communication with provider: none. Spoke with spouse states patient is doing well , sitting in a recliner looking at TV. Patient states patient released from Rehab this am. Patient's spouse states patient has a follow up appointment with PCP on 10/4/2022. LPN Care Coordinator reviewed discharge instructions with family who verbalized understanding. The family was given an opportunity to ask questions and does not have any further questions or concerns at this time. Were discharge instructions available to patient? Yes. Reviewed appropriate site of care based on symptoms and resources available to patient including: When to call 911. The family agrees to contact the PCP office for questions related to their healthcare. Advance Care Planning:   Does patient have an Advance Directive: decision maker updated.     Medication reconciliation was performed with family, who verbalizes understanding of administration of home medications. Medications reviewed, 1111F entered: N/A    Was patient discharged with a pulse oximeter? no    Non-face-to-face services provided:  Obtained and reviewed discharge summary and/or continuity of care documents    Offered patient enrollment in the Remote Patient Monitoring (RPM) program for in-home monitoring: NA.    Care Transitions 24 Hour Call    Do you have a copy of your discharge instructions?: Yes  Do you have all of your prescriptions and are they filled?: Yes  Have you been contacted by a [address]?: No  Have you scheduled your follow up appointment?: Yes (Comment: PCP appointment 10/4/2022)  How are you going to get to your appointment?: Car - family or friend to transport  Do you have support at home?: Partner/Spouse/SO  Do you feel like you have everything you need to keep you well at home?: Yes (Comment: Patient states will reguest home health PT @ PCP appointment)  Are you an active caregiver in your home?: No  Care Transitions Interventions  No Identified Needs         Follow Up  Future Appointments   Date Time Provider Mac Almaraz   10/4/2022 10:00 AM Lalo Rios MD [facility] AMB   10/27/2022  2:40 PM AARON Peterson - CNP PPS [facility] AMB   12/8/2022  1:00 PM MD EDWIGE Solano [facility] AMB   1/9/2023  1:00 PM [facility] ULTRASOUND 2 [facility] AMB   1/9/2023  2:00 PM AARON Franklin - CNP [facility] AMB       LPN Care Coordinator provided contact information. Plan for follow-up call in 5-7 days based on severity of symptoms and risk factors.   Plan for next call: follow-up appointment-     Katie Olmedo LPN

## 2022-10-04 ENCOUNTER — OFFICE VISIT (OUTPATIENT)
Dept: INTERNAL MEDICINE CLINIC | Facility: CLINIC | Age: 77
End: 2022-10-04

## 2022-10-04 VITALS
SYSTOLIC BLOOD PRESSURE: 107 MMHG | RESPIRATION RATE: 20 BRPM | HEART RATE: 61 BPM | OXYGEN SATURATION: 91 % | DIASTOLIC BLOOD PRESSURE: 53 MMHG

## 2022-10-04 DIAGNOSIS — R10.2 PELVIC PAIN IN MALE: ICD-10-CM

## 2022-10-04 DIAGNOSIS — E11.51 DM (DIABETES MELLITUS) TYPE II, CONTROLLED, WITH PERIPHERAL VASCULAR DISORDER (HCC): ICD-10-CM

## 2022-10-04 DIAGNOSIS — I10 PRIMARY HYPERTENSION: ICD-10-CM

## 2022-10-04 DIAGNOSIS — L89.520 PRESSURE SORE ON ANKLE, LEFT, UNSTAGEABLE (HCC): ICD-10-CM

## 2022-10-04 DIAGNOSIS — R29.898 WEAKNESS OF BOTH LOWER EXTREMITIES: ICD-10-CM

## 2022-10-04 DIAGNOSIS — M51.36 DDD (DEGENERATIVE DISC DISEASE), LUMBAR: ICD-10-CM

## 2022-10-04 DIAGNOSIS — L89.620 PRESSURE SORE ON HEEL, LEFT, UNSTAGEABLE (HCC): ICD-10-CM

## 2022-10-04 DIAGNOSIS — Z09 HOSPITAL DISCHARGE FOLLOW-UP: Primary | ICD-10-CM

## 2022-10-04 DIAGNOSIS — D64.9 ANEMIA, UNSPECIFIED TYPE: ICD-10-CM

## 2022-10-04 LAB
ALBUMIN SERPL-MCNC: 3.3 G/DL (ref 3.2–4.6)
ALBUMIN/GLOB SERPL: 0.9 {RATIO} (ref 1.2–3.5)
ALP SERPL-CCNC: 105 U/L (ref 50–136)
ALT SERPL-CCNC: 72 U/L (ref 12–65)
ANION GAP SERPL CALC-SCNC: 7 MMOL/L (ref 4–13)
AST SERPL-CCNC: 23 U/L (ref 15–37)
BASOPHILS # BLD: 0.1 K/UL (ref 0–0.2)
BASOPHILS NFR BLD: 1 % (ref 0–2)
BILIRUB SERPL-MCNC: 0.5 MG/DL (ref 0.2–1.1)
BUN SERPL-MCNC: 24 MG/DL (ref 8–23)
CALCIUM SERPL-MCNC: 9.5 MG/DL (ref 8.3–10.4)
CHLORIDE SERPL-SCNC: 103 MMOL/L (ref 101–110)
CO2 SERPL-SCNC: 27 MMOL/L (ref 21–32)
CREAT SERPL-MCNC: 1.1 MG/DL (ref 0.8–1.5)
DIFFERENTIAL METHOD BLD: ABNORMAL
EOSINOPHIL # BLD: 0 K/UL (ref 0–0.8)
EOSINOPHIL NFR BLD: 0 % (ref 0.5–7.8)
ERYTHROCYTE [DISTWIDTH] IN BLOOD BY AUTOMATED COUNT: 16.8 % (ref 11.9–14.6)
GLOBULIN SER CALC-MCNC: 3.5 G/DL (ref 2.3–3.5)
GLUCOSE SERPL-MCNC: 150 MG/DL (ref 65–100)
HCT VFR BLD AUTO: 32 % (ref 41.1–50.3)
HGB BLD-MCNC: 9.8 G/DL (ref 13.6–17.2)
IMM GRANULOCYTES # BLD AUTO: 0.1 K/UL (ref 0–0.5)
IMM GRANULOCYTES NFR BLD AUTO: 1 % (ref 0–5)
LYMPHOCYTES # BLD: 0.9 K/UL (ref 0.5–4.6)
LYMPHOCYTES NFR BLD: 9 % (ref 13–44)
MCH RBC QN AUTO: 29.9 PG (ref 26.1–32.9)
MCHC RBC AUTO-ENTMCNC: 30.6 G/DL (ref 31.4–35)
MCV RBC AUTO: 97.6 FL (ref 79.6–97.8)
MONOCYTES # BLD: 0.7 K/UL (ref 0.1–1.3)
MONOCYTES NFR BLD: 6 % (ref 4–12)
NEUTS SEG # BLD: 9.1 K/UL (ref 1.7–8.2)
NEUTS SEG NFR BLD: 83 % (ref 43–78)
NRBC # BLD: 0 K/UL (ref 0–0.2)
PLATELET # BLD AUTO: 274 K/UL (ref 150–450)
PMV BLD AUTO: 11.2 FL (ref 9.4–12.3)
POTASSIUM SERPL-SCNC: 4.8 MMOL/L (ref 3.5–5.1)
PROT SERPL-MCNC: 6.8 G/DL (ref 6.3–8.2)
RBC # BLD AUTO: 3.28 M/UL (ref 4.23–5.6)
SODIUM SERPL-SCNC: 137 MMOL/L (ref 136–145)
WBC # BLD AUTO: 10.8 K/UL (ref 4.3–11.1)

## 2022-10-04 PROCEDURE — 99496 TRANSJ CARE MGMT HIGH F2F 7D: CPT | Performed by: INTERNAL MEDICINE

## 2022-10-04 PROCEDURE — 1111F DSCHRG MED/CURRENT MED MERGE: CPT | Performed by: INTERNAL MEDICINE

## 2022-10-04 RX ORDER — GUAIFENESIN 1200 MG/1
1200 TABLET, EXTENDED RELEASE ORAL 2 TIMES DAILY PRN
Qty: 180 TABLET | Refills: 1 | Status: SHIPPED | OUTPATIENT
Start: 2022-10-04 | End: 2022-10-26

## 2022-10-04 RX ORDER — VALSARTAN 160 MG/1
160 TABLET ORAL DAILY
Qty: 90 TABLET | Refills: 3 | Status: SHIPPED
Start: 2022-10-04 | End: 2022-10-19

## 2022-10-04 ASSESSMENT — PATIENT HEALTH QUESTIONNAIRE - PHQ9
SUM OF ALL RESPONSES TO PHQ QUESTIONS 1-9: 1
SUM OF ALL RESPONSES TO PHQ QUESTIONS 1-9: 1
2. FEELING DOWN, DEPRESSED OR HOPELESS: 0
SUM OF ALL RESPONSES TO PHQ9 QUESTIONS 1 & 2: 1
1. LITTLE INTEREST OR PLEASURE IN DOING THINGS: 1
SUM OF ALL RESPONSES TO PHQ QUESTIONS 1-9: 1
SUM OF ALL RESPONSES TO PHQ QUESTIONS 1-9: 1

## 2022-10-04 ASSESSMENT — ENCOUNTER SYMPTOMS
COUGH: 0
WHEEZING: 0
VOMITING: 0
NAUSEA: 0
CONSTIPATION: 0
DIARRHEA: 0
BACK PAIN: 1
ANAL BLEEDING: 0
SHORTNESS OF BREATH: 0

## 2022-10-04 NOTE — PROGRESS NOTES
10/4/2022 1:40 PM  Location:[facility] [address] INTERNAL MEDICINE  [place]  Patient #:  [id number]  YOB: 1945            History of Present Illness     Chief Complaint   Patient presents with    Follow-Up from Hospital     Was in [facility] - then transferred to Rehab    Extremity Weakness     Can't walk - has weakness in his leg    Medication Management     Wants to know if he should start back on his Januvia and Meformin    Other     Wants a referral to See Dr Chandana Fox       Mr. Pacheco Rosado is a 65 y.o. male  who presents for the above. Patient developed discitis as well as a psoas abscess after kyphoplasty was performed. He fell after kyphoplasty which led to his inability to walk. He was hospitalized and then in rehab for a total of 2 months. Source of his discitis was never cultured out. He has walked minimally since he has been in rehab. Would like to see a neurosurgeon related to this.          Allergies   Allergen Reactions    Azithromycin Hives    Oxaprozin Other (See Comments)     ELEVATED BLOOD PRESSURE     Past Medical History:   Diagnosis Date    Acute respiratory failure with hypoxia ([address] 10/23/2014    MINI (acute kidney injury) ([address] 09/15/2014    MINI (acute kidney injury) ([address]     MINI (acute kidney injury) ([address]     Allergic rhinitis 11/10/2015    Asthma 11/10/2015    Benign essential hypertension 11/10/2015    Benign neoplasm of colon 11/10/2015    CAD (coronary artery disease) 11/10/2015    CAD (coronary artery disease) 1998, 1999    mix2, 3 stents [id number]    CAP (community acquired pneumonia) 12/31/2020    Cardiomyopathy ([address] 45/65/9802    Complicated wound infection 11/10/2015    COPD (chronic obstructive pulmonary disease) with emphysema ([address] 11/10/2015    COPD exacerbation ([address] 10/12/2011    COVID-19 12/31/2020    Diabetes mellitus type 2, controlled ([address] 11/10/2015    Diabetic neuropathy ([address] 11/10/2015    Disruption of wound, unspecified 10/09/2014 Encounter for long-term (current) use of other high-risk medications 11/10/2015    Extremity atherosclerosis with intermittent claudication ([facility] 11/10/2015    H/O endarterectomy 11/10/2015    Hiatal hernia 11/10/2015    History of 2019 novel coronavirus disease (COVID-19)     2020-    Hyperglycemia due to type 1 diabetes mellitus ([address] 11/10/2015    Hyperlipidemia 11/10/2015    ICD (implantable cardioverter-defibrillator) in place 2022    Monomorphic ventricular tachycardia 2020    Myocardial infarction [facility])     Myocardial infarction ([facility] 11/10/2015    Obesity 11/10/2015    Orthostatic hypotension 11/10/2015    Osteoarthritis 11/10/2015    Peripheral vascular disease ([facility] 11/10/2015    PNA (pneumonia) 2019    PVC (premature ventricular contraction)     Rash     Seroma complicating a procedure 10/02/2014    Sleep apnea     cpap    Toe laceration     Type 2 diabetes with nephropathy ([facility]     Uncontrolled type 2 diabetes mellitus 11/10/2015    Vertiginous syndromes and other disorders of vestibular system 11/10/2015     Social History     Socioeconomic History    Marital status:      Spouse name: None    Number of children: None    Years of education: None    Highest education level: None   Tobacco Use    Smoking status: Former     Packs/day: 1.00     Types: Cigarettes     Quit date: 10/2/2011     Years since quittin.0    Smokeless tobacco: Current   Substance and Sexual Activity    Alcohol use: Yes     Alcohol/week: 0.0 standard drinks    Drug use: No   Social History Narrative    Lives with wife     Past Surgical History:   Procedure Laterality Date    CARDIAC CATHETERIZATION  2018    LV EF=40%. LM:nl. LAD:30-40% mid stenosis. LCX OM1:95% stenosis. RCA:100% occlusion at RV branch.     CORONARY ANGIOPLASTY WITH STENT PLACEMENT  2018    LCX OM1:2.5 x 12 Giuseppe RAKESH    CORONARY ANGIOPLASTY WITH STENT PLACEMENT      [place] ABDOMEN SURGERY [address] [place] [address] abdominal cyst removed    [place] CARDIAC SURG PROCEDURE UNLIST  1999    stents x3    SPINE SURGERY N/A 7/19/2022    LUMBAR 2, LUMBAR 4 KYPHOPLASTY AND ANY OTHER INDICATED LEVELS performed by Laurie Rocha MD at [facility]  11/5/14    Repair of right common femoral artery     VASCULAR SURGERY  9/11/14    tiffanie femoral endarterectomy     Current Outpatient Medications   Medication Sig Dispense Refill    valsartan (DIOVAN) 160 MG tablet Take 1 tablet by mouth daily 90 tablet 3    guaiFENesin 1200 MG TB12 Take 1,200 mg by mouth 2 times daily as needed (congested cough) 180 tablet 1    glipiZIDE (GLUCOTROL XL) 10 MG extended release tablet Take 1 tablet by mouth daily 30 tablet 0    furosemide (LASIX) 20 MG tablet Take 1 tablet by mouth in the morning. 60 tablet 3    pantoprazole (PROTONIX) 40 MG tablet Take 1 tablet by mouth in the morning and 1 tablet in the evening. Take before meals. 30 tablet 3    rosuvastatin (CRESTOR) 10 MG tablet TAKE 1 TABLET BY MOUTH EVERY DAY 90 tablet 0    tiZANidine (ZANAFLEX) 2 MG tablet Take 1 tablet by mouth every 8 hours as needed (muscle spasms) 30 tablet 1    fluticasone-salmeterol (ADVAIR) 250-50 MCG/ACT AEPB diskus inhaler Inhale 1 puff into the lungs in the morning and at bedtime      albuterol sulfate  (90 Base) MCG/ACT inhaler USE 2 PUFFS BY INHALATION 4 TIMES DAILY AS NEEDED FOR WHEEZING OR SHORTNESS OF BREATH.      amiodarone (CORDARONE) 200 MG tablet Take 200 mg by mouth 2 times daily      apixaban (ELIQUIS) 5 MG TABS tablet Take 5 mg by mouth every 12 hours      ascorbic acid (VITAMIN C) 500 MG tablet Take 500 mg by mouth      carvedilol (COREG) 25 MG tablet Take 25 mg by mouth 2 times daily (with meals)      Cholecalciferol 50 MCG (2000 UT) TABS Take 2,000 Units by mouth      clopidogrel (PLAVIX) 75 MG tablet Take 75 mg by mouth daily      fluticasone (FLONASE) 50 MCG/ACT nasal spray USE 1 OR 2 SPRAYS IN EACH NOSTRIL EVERY DAY. glucosamine-chondroitin 750-600 MG TABS tablet Take by mouth 2 times daily      latanoprost (XALATAN) 0.005 % ophthalmic solution Apply 1 drop to eye      magnesium oxide (MAG-OX) 400 (240 Mg) MG tablet TAKE 1 TABLET BY MOUTH EVERY DAY      montelukast (SINGULAIR) 10 MG tablet TAKE 1 TABLET BY MOUTH EVERY DAY AT BEDTIME      nitroGLYCERIN (NITROSTAT) 0.4 MG SL tablet Place 0.4 mg under the tongue      polyethylene glycol (GLYCOLAX) 17 GM/SCOOP powder Take 17 g by mouth daily       No current facility-administered medications for this visit. Health Maintenance   Topic Date Due    DTaP/Tdap/Td vaccine (1 - Tdap) Never done    Low dose CT lung screening  Never done    Pneumococcal 65+ years Vaccine (3 - PPSV23 or PCV20) 10/17/2016    COVID-19 Vaccine (3 - Booster for Moderna series) 09/15/2021    Diabetic retinal exam  05/18/2022    Flu vaccine (1) 08/01/2022    Diabetic foot exam  09/21/2022    A1C test (Diabetic or Prediabetic)  02/10/2023    Lipids  03/05/2023    Annual Wellness Visit (AWV)  03/24/2023    Prostate Specific Antigen (PSA) Screening or Monitoring  05/18/2023    Depression Screen  07/06/2023    Shingles vaccine  Completed    Hepatitis C screen  Completed    Hepatitis A vaccine  Aged Out    Hib vaccine  Aged Out    Meningococcal (ACWY) vaccine  Aged Out     Family History   Problem Relation Age of Onset    Breast Cancer Mother     Hypertension Mother     Other Father         DIVERTICULITIS    Crohn's Disease Sister     Lung Disease Brother         mesothioloma    Diabetes Sister     Heart Attack Father     Heart Disease Father     Stroke Mother              Review of Systems  Review of Systems   Constitutional:  Positive for fatigue. Negative for chills and fever. Respiratory:  Negative for cough, shortness of breath (at baseline) and wheezing. Cardiovascular:  Negative for chest pain and palpitations. Gastrointestinal:  Negative for anal bleeding, constipation, diarrhea, nausea and vomiting. Musculoskeletal:  Positive for back pain. Neurological:  Positive for weakness. Negative for numbness. BP (!) 107/53 (Site: Right Upper Arm, Position: Sitting, Cuff Size: Large Adult)   Pulse 61   Resp 20   SpO2 91% Comment: room air      Physical Exam    Physical Exam  Constitutional:       General: He is not in acute distress. Appearance: Normal appearance. Comments: Pale; stooped forward in a wheelchair   HENT:      Head: Normocephalic and atraumatic. Eyes:      Comments: Pale conjunctiva   Neck:      Vascular: No carotid bruit. Cardiovascular:      Rate and Rhythm: Normal rate and regular rhythm. Pulmonary:      Effort: Pulmonary effort is normal.      Breath sounds: Normal breath sounds. Abdominal:      General: Abdomen is flat. There is no distension. Palpations: Abdomen is soft. Tenderness: There is no abdominal tenderness. Musculoskeletal:      Right lower leg: No edema. Left lower leg: No edema. Skin:     Coloration: Skin is pale. Findings: Wound (pressure wounds are healing well) present. Neurological:      General: No focal deficit present. Mental Status: He is alert and oriented to person, place, and time. Psychiatric:         Mood and Affect: Mood is depressed (mildly so). Behavior: Behavior normal.         Assessment & Plan    Current Outpatient Medications   Medication Sig Dispense Refill    valsartan (DIOVAN) 160 MG tablet Take 1 tablet by mouth daily 90 tablet 3    guaiFENesin 1200 MG TB12 Take 1,200 mg by mouth 2 times daily as needed (congested cough) 180 tablet 1    glipiZIDE (GLUCOTROL XL) 10 MG extended release tablet Take 1 tablet by mouth daily 30 tablet 0    furosemide (LASIX) 20 MG tablet Take 1 tablet by mouth in the morning. 60 tablet 3    pantoprazole (PROTONIX) 40 MG tablet Take 1 tablet by mouth in the morning and 1 tablet in the evening. Take before meals.  30 tablet 3    rosuvastatin (CRESTOR) 10 MG tablet TAKE 1 TABLET BY MOUTH EVERY DAY 90 tablet 0    tiZANidine (ZANAFLEX) 2 MG tablet Take 1 tablet by mouth every 8 hours as needed (muscle spasms) 30 tablet 1    fluticasone-salmeterol (ADVAIR) 250-50 MCG/ACT AEPB diskus inhaler Inhale 1 puff into the lungs in the morning and at bedtime      albuterol sulfate  (90 Base) MCG/ACT inhaler USE 2 PUFFS BY INHALATION 4 TIMES DAILY AS NEEDED FOR WHEEZING OR SHORTNESS OF BREATH.      amiodarone (CORDARONE) 200 MG tablet Take 200 mg by mouth 2 times daily      apixaban (ELIQUIS) 5 MG TABS tablet Take 5 mg by mouth every 12 hours      ascorbic acid (VITAMIN C) 500 MG tablet Take 500 mg by mouth      carvedilol (COREG) 25 MG tablet Take 25 mg by mouth 2 times daily (with meals)      Cholecalciferol 50 MCG (2000 UT) TABS Take 2,000 Units by mouth      clopidogrel (PLAVIX) 75 MG tablet Take 75 mg by mouth daily      fluticasone (FLONASE) 50 MCG/ACT nasal spray USE 1 OR 2 SPRAYS IN EACH NOSTRIL EVERY DAY. glucosamine-chondroitin 750-600 MG TABS tablet Take by mouth 2 times daily      latanoprost (XALATAN) 0.005 % ophthalmic solution Apply 1 drop to eye      magnesium oxide (MAG-OX) 400 (240 Mg) MG tablet TAKE 1 TABLET BY MOUTH EVERY DAY      montelukast (SINGULAIR) 10 MG tablet TAKE 1 TABLET BY MOUTH EVERY DAY AT BEDTIME      nitroGLYCERIN (NITROSTAT) 0.4 MG SL tablet Place 0.4 mg under the tongue      polyethylene glycol (GLYCOLAX) 17 GM/SCOOP powder Take 17 g by mouth daily       No current facility-administered medications for this visit.        Orders Placed This Encounter   Procedures    MRI LUMBAR SPINE WO CONTRAST     Standing Status:   Future     Standing Expiration Date:   10/4/2023    XR PELVIS (MIN 3 VIEWS)     Standing Status:   Future     Standing Expiration Date:   10/4/2023    Comprehensive Metabolic Panel     Standing Status:   Future     Number of Occurrences:   1     Standing Expiration Date:   10/4/2023    CBC with Auto Differential     Standing Status:   Future     Number of Occurrences:   1     Standing Expiration Date:   10/4/2023    AL DISCHARGE MEDS RECONCILED W/ CURRENT OUTPATIENT MED LIST       Orders Placed This Encounter   Medications    valsartan (DIOVAN) 160 MG tablet     Sig: Take 1 tablet by mouth daily     Dispense:  90 tablet     Refill:  3    guaiFENesin 1200 MG TB12     Sig: Take 1,200 mg by mouth 2 times daily as needed (congested cough)     Dispense:  180 tablet     Refill:  1       Medications Discontinued During This Encounter   Medication Reason    SITagliptin (JANUVIA) 100 MG tablet LIST CLEANUP    tamsulosin (FLOMAX) 0.4 MG capsule Therapy completed    calcitonin (MIACALCIN) 200 UNIT/ACT nasal spray Therapy completed    valsartan (DIOVAN) 320 MG tablet REORDER    guaiFENesin 1200 MG TB12 REORDER    OLANZapine zydis (ZYPREXA) 5 MG disintegrating tablet Therapy completed        Diagnosis Orders   1. Hospital discharge follow-up  AL DISCHARGE MEDS RECONCILED W/ CURRENT OUTPATIENT MED LIST      2. Primary hypertension  Comprehensive Metabolic Panel    Comprehensive Metabolic Panel      3. Anemia, unspecified type  CBC with Auto Differential    CBC with Auto Differential      4. DDD (degenerative disc disease), lumbar  MRI LUMBAR SPINE WO CONTRAST      5. Weakness of both lower extremities  MRI LUMBAR SPINE WO CONTRAST      6. Pelvic pain in male  XR PELVIS (MIN 3 VIEWS)      7. DM (diabetes mellitus) type II, controlled, with peripheral vascular disorder ([address]        8. Pressure sore on heel, left, unstageable ([address]        9. Pressure sore on ankle, left, unstageable ([address]           Went over medications with the patient and his wife. Reviewed most recent MRI imaging. Would prefer to repeat this before referring him onto a neurosurgeon. Encouraged patient to eat as well as cooperate with physical occupational therapy at home. Will investigate for underlying anemia considering his pale skin and conjunctive a.   Agree with keeping him off of amlodipine at present secondary to his lower blood pressure. Provided PolyMem for his ankle pressure sore and when bandaging for his heel ulcer. We will have him maintain close follow-up related to these lesions and he should be referred on to wound care if these are not improving. Patient has fair control of his pain as long as he is sitting still. We will try to determine if there is a surgical issue at play related to spinal stenosis and or nerve impingement. We will also investigate his pelvis with an x-ray as above considering his fall and his wife's concern that the majority of his pain is pelvic in nature. Follow up as documented or earlier as needed. Knows to keep a low threshold for contacting the office with worsening symptoms. Return in about 2 weeks (around 10/18/2022).           Kevan Avery MD

## 2022-10-05 ENCOUNTER — TELEPHONE (OUTPATIENT)
Dept: INTERNAL MEDICINE CLINIC | Facility: CLINIC | Age: 77
End: 2022-10-05

## 2022-10-05 NOTE — RESULT ENCOUNTER NOTE
You are not quite as anemic as you were. Be sure to eat right and take a MVI with iron daily. Also, your liver enzymes are mildly elevated. I wonder if this could be related to your amiodarone dosing. If you have been on amiodarone twice daily for 4 weeks, please reduce this to 200 mg once a day. We need to repeat a liver panel and cbc when you see Janay in a couple of weeks. Thanks.   Dung Arambula

## 2022-10-05 NOTE — TELEPHONE ENCOUNTER
----- Message from Kenia Hill MD sent at 10/5/2022  1:06 PM EDT -----  You are not quite as anemic as you were. Be sure to eat right and take a MVI with iron daily. Also, your liver enzymes are mildly elevated. I wonder if this could be related to your amiodarone dosing. If you have been on amiodarone twice daily for 4 weeks, please reduce this to 200 mg once a day. We need to repeat a liver panel and cbc when you see Janay in a couple of weeks. Thanks.   Dung Nevarez 32

## 2022-10-10 ENCOUNTER — CARE COORDINATION (OUTPATIENT)
Dept: CARE COORDINATION | Facility: CLINIC | Age: 77
End: 2022-10-10

## 2022-10-10 NOTE — CARE COORDINATION
[facility] Care Transitions Follow Up Call    LPN Care Coordinator contacted the family by telephone to follow up after admission on 2022. Verified name and  with family as identifiers. Patient: Hong Steinberg  Patient : 1945   MRN: [id number]  Reason for Admission: Lumbar discitis  Discharge Date: 22 RARS: Readmission Risk Score: 26.2      Needs to be reviewed by the provider   Additional needs identified to be addressed with provider: No  none             Method of communication with provider: none. Spoke with patient 's spouse states patient is fine. Patient spouse states patient is doing exercise and will try and stand up in the walker when the physical therapist come for home visit. Addressed changes since last contact:  none  Discussed follow-up appointments. If no appointment was previously scheduled, appointment scheduling offered: Yes. Is follow up appointment scheduled within 7 days of discharge? Yes. Follow Up  Future Appointments   Date Time Provider Mac Almaraz   10/18/2022  1:15 PM [facility] MRI UNIT 1 [facility]   10/19/2022  1:40 PM Oreb Vila, APRN - CNP [facility]   10/27/2022  2:40 PM Shaun Vides APRN - CNP [facility]   2022  1:00 PM Zander Marcial MD [facility]   2023  1:00 PM [facility] ULTRASOUND 2 [facility]   2023  2:00 PM Paola Simental, APRN - CNP [facility]   3/6/2023  2:45 PM Paulette Flores MD [facility] AMB     Non-BS follow up appointment(s):     LPN Care Coordinator reviewed medical action plan with family and discussed any barriers to care and/or understanding of plan of care after discharge. Discussed appropriate site of care based on symptoms and resources available to patient including: Specialist  When to call 911. The family agrees to contact the PCP office for questions related to their healthcare. Advance Care Planning:   decision maker updated.      Patients top risk factors for readmission:  lumbar discitis  Interventions to address risk factors: Obtained and reviewed discharge summary and/or continuity of care documents    Offered patient enrollment in the Remote Patient Monitoring (RPM) program for in-home monitoring: NA.     Care Transitions Subsequent and Final Call    Subsequent and Final Calls  Care Transitions Interventions  Other Interventions:             LPN Care Coordinator provided contact information for future needs.      Paris Caputo LPN

## 2022-10-11 ENCOUNTER — TELEPHONE (OUTPATIENT)
Dept: INTERNAL MEDICINE CLINIC | Facility: CLINIC | Age: 77
End: 2022-10-11

## 2022-10-14 LAB
FUNGAL CULT/SMEAR: NORMAL
FUNGUS (MYCOLOGY) CULTURE: NEGATIVE
FUNGUS SMEAR: NORMAL
REFLEX TO ID: NORMAL
SPECIMEN PROCESSING: NORMAL
SPECIMEN SOURCE: NORMAL

## 2022-10-18 ENCOUNTER — HOSPITAL ENCOUNTER (OUTPATIENT)
Dept: GENERAL RADIOLOGY | Age: 77
Discharge: HOME | End: 2022-10-21
Payer: MEDICARE

## 2022-10-18 ENCOUNTER — HOSPITAL ENCOUNTER (OUTPATIENT)
Dept: MRI IMAGING | Age: 77
Discharge: HOME | End: 2022-10-21
Payer: MEDICARE

## 2022-10-18 DIAGNOSIS — M51.36 DDD (DEGENERATIVE DISC DISEASE), LUMBAR: ICD-10-CM

## 2022-10-18 DIAGNOSIS — R29.898 WEAKNESS OF BOTH LOWER EXTREMITIES: ICD-10-CM

## 2022-10-18 DIAGNOSIS — R10.2 PELVIC PAIN IN MALE: ICD-10-CM

## 2022-10-18 PROCEDURE — 72148 MRI LUMBAR SPINE W/O DYE: CPT

## 2022-10-18 PROCEDURE — 72170 X-RAY EXAM OF PELVIS: CPT

## 2022-10-19 ENCOUNTER — OFFICE VISIT (OUTPATIENT)
Dept: INTERNAL MEDICINE CLINIC | Facility: CLINIC | Age: 77
End: 2022-10-19
Payer: MEDICARE

## 2022-10-19 VITALS
BODY MASS INDEX: 32.07 KG/M2 | HEIGHT: 73 IN | DIASTOLIC BLOOD PRESSURE: 53 MMHG | HEART RATE: 62 BPM | OXYGEN SATURATION: 94 % | WEIGHT: 242 LBS | SYSTOLIC BLOOD PRESSURE: 99 MMHG | RESPIRATION RATE: 17 BRPM

## 2022-10-19 DIAGNOSIS — I10 PRIMARY HYPERTENSION: ICD-10-CM

## 2022-10-19 DIAGNOSIS — M46.46 DISCITIS OF LUMBAR REGION: ICD-10-CM

## 2022-10-19 DIAGNOSIS — L03.116 CELLULITIS OF LEFT LOWER EXTREMITY: Primary | ICD-10-CM

## 2022-10-19 DIAGNOSIS — R74.01 ELEVATED ALT MEASUREMENT: ICD-10-CM

## 2022-10-19 DIAGNOSIS — D50.9 IRON DEFICIENCY ANEMIA, UNSPECIFIED IRON DEFICIENCY ANEMIA TYPE: ICD-10-CM

## 2022-10-19 PROCEDURE — G8427 DOCREV CUR MEDS BY ELIG CLIN: HCPCS | Performed by: NURSE PRACTITIONER

## 2022-10-19 PROCEDURE — G8484 FLU IMMUNIZE NO ADMIN: HCPCS | Performed by: NURSE PRACTITIONER

## 2022-10-19 PROCEDURE — 99214 OFFICE O/P EST MOD 30 MIN: CPT | Performed by: NURSE PRACTITIONER

## 2022-10-19 PROCEDURE — 1123F ACP DISCUSS/DSCN MKR DOCD: CPT | Performed by: NURSE PRACTITIONER

## 2022-10-19 PROCEDURE — 4004F PT TOBACCO SCREEN RCVD TLK: CPT | Performed by: NURSE PRACTITIONER

## 2022-10-19 PROCEDURE — G8417 CALC BMI ABV UP PARAM F/U: HCPCS | Performed by: NURSE PRACTITIONER

## 2022-10-19 RX ORDER — TRAMADOL HYDROCHLORIDE 50 MG/1
50 TABLET ORAL EVERY 6 HOURS PRN
COMMUNITY

## 2022-10-19 RX ORDER — VALSARTAN 160 MG/1
80 TABLET ORAL DAILY
Qty: 90 TABLET | Refills: 3
Start: 2022-10-19 | End: 2022-10-19 | Stop reason: SINTOL

## 2022-10-19 RX ORDER — ACETAMINOPHEN 500 MG
500 TABLET ORAL EVERY 6 HOURS PRN
COMMUNITY

## 2022-10-19 RX ORDER — DOXYCYCLINE HYCLATE 100 MG
100 TABLET ORAL 2 TIMES DAILY
Qty: 20 TABLET | Refills: 0 | Status: SHIPPED | OUTPATIENT
Start: 2022-10-19 | End: 2022-10-29

## 2022-10-19 RX ORDER — VALSARTAN 80 MG/1
40 TABLET ORAL DAILY
Qty: 30 TABLET | Refills: 5 | Status: SHIPPED | OUTPATIENT
Start: 2022-10-19 | End: 2022-11-03 | Stop reason: SDUPTHER

## 2022-10-19 ASSESSMENT — ENCOUNTER SYMPTOMS
SHORTNESS OF BREATH: 0
BACK PAIN: 1
ABDOMINAL PAIN: 0
CONSTIPATION: 1
VOMITING: 0
NAUSEA: 0

## 2022-10-19 NOTE — PROGRESS NOTES
10/19/2022 1:34 PM  Location:[facility] [address] [facility]  [place]  Patient #:  [id number]  YOB: 1945          YOUR LAST HEMOGLOBIN A1CS:   No results found for: HBA1C, [id number]    YOUR LAST LIPID PROFILE:   Lab Results   Component Value Date/Time    CHOL 77 03/05/2022 06:14 AM    HDL 45 03/05/2022 06:14 AM    VLDL 18 09/21/2021 02:54 PM         Lab Results   Component Value Date/Time    GFRAA >60 09/16/2022 04:42 AM    BUN 24 10/04/2022 11:31 AM     10/04/2022 11:31 AM    K 4.8 10/04/2022 11:31 AM     10/04/2022 11:31 AM    CO2 27 10/04/2022 11:31 AM           History of Present Illness   Mr. Mary Anne Harding is a 65 y.o. male  who presents for wound recheck, follow up. Mr. Nnamdi Howard presents for 2-week wound check. He was recently seen after being admitted for several months  for discitis and psoas abscess after kyphoplasty. An MRI yesterday showed persistent discitis despite 6 weeks of antibiotic therapy. Dr. aLrry Black recommends following up with his infectious disease specialist.    At his hospital follow-up visit he was found to have a pressure ulcer on his heel, PolyMem was provided. He needs to be evaluated for wound care. He also had an elevated ALT thought to be due to from amiodarone dosing which was decreased as well as resolving anemia. These need to be rechecked today. His Amlodipine was stopped due to lower blood pressures. MRI 10/18:  1. Findings suggesting some early improvement. Prior paravertebral abscesses  appear improved at the left L2-3 level and are no longer clearly appreciated on  the left L3-4 level. Persistent changes concerning for discitis/osteomyelitis at  L2, L3, and L4 remain. However, fluid signal at L2-3 and L3-4 appears improved  with some interval collapse of disc space at these levels. In addition, some  improved edema is suggested in the more inferior L4 vertebral body.  Reassessment  of prior phlegmonous changes within the ventral spinal canal is limited although  these are not clearly changed. Persistent central canal stenoses and neural  foraminal stenoses are seen which are not clearly changed. XR Pelvis, 10/18:  1. Kyphoplasty repair at L4. No acute displaced fracture is seen of the bony  pelvis. Hospital discharge note, 10/4: Went over medications with the patient and his wife. Reviewed most recent MRI imaging. Would prefer to repeat this before referring him onto a neurosurgeon. Encouraged patient to eat as well as cooperate with physical occupational therapy at home. Will investigate for underlying anemia considering his pale skin and conjunctive a. Agree with keeping him off of amlodipine at present secondary to his lower blood pressure. Provided PolyMem for his ankle pressure sore and when bandaging for his heel ulcer. We will have him maintain close follow-up related to these lesions and he should be referred on to wound care if these are not improving. Patient has fair control of his pain as long as he is sitting still. We will try to determine if there is a surgical issue at play related to spinal stenosis and or nerve impingement. We will also investigate his pelvis with an x-ray as above considering his fall and his wife's concern that the majority of his pain is pelvic in nature. Follow up as documented or earlier as needed. Knows to keep a low threshold for contacting the office with worsening symptoms. Reports in the past 2 weeks that his back pain is improved. He is getting stronger and actually walked 2 times within the last week. Physical therapy is coming out twice a week and home health nursing is coming about twice a week. He still has decreased appetite and his wife who is with him today has been trying to give him either protein supplements or multivitamin with iron. He continues to take Plavix and Eliquis. He denies dark stools but does have some issues with constipation. He is passing gas and denies any abdominal pain. She notes that his left heel over the past 4 days has gotten more red and she noticed an odor yesterday. It has been draining some gray drainage over the last several days. He does have a history of MRSA. He has been off all antibiotics for the past 7 weeks. He denies fevers or chills, nausea or vomiting, chest pain, abdominal pain, syncope.       Allergies   Allergen Reactions    Azithromycin Hives    Oxaprozin Other (See Comments)     ELEVATED BLOOD PRESSURE     Past Medical History:   Diagnosis Date    Acute respiratory failure with hypoxia ([address] 10/23/2014    MINI (acute kidney injury) ([address] 09/15/2014    MINI (acute kidney injury) ([address]     MINI (acute kidney injury) ([address]     Allergic rhinitis 11/10/2015    Asthma 11/10/2015    Benign essential hypertension 11/10/2015    Benign neoplasm of colon 11/10/2015    CAD (coronary artery disease) 11/10/2015    CAD (coronary artery disease) 1998, 1999    mix2, 3 stents [id number]    CAP (community acquired pneumonia) 12/31/2020    Cardiomyopathy ([address] 90/16/1922    Complicated wound infection 11/10/2015    COPD (chronic obstructive pulmonary disease) with emphysema ([address] 11/10/2015    COPD exacerbation ([address] 10/12/2011    COVID-19 12/31/2020    Diabetes mellitus type 2, controlled ([address] 11/10/2015    Diabetic neuropathy ([address] 11/10/2015    Disruption of wound, unspecified 10/09/2014    Encounter for long-term (current) use of other high-risk medications 11/10/2015    Extremity atherosclerosis with intermittent claudication ([address] 11/10/2015    H/O endarterectomy 11/10/2015    Hiatal hernia 11/10/2015    History of 2019 novel coronavirus disease (COVID-19)     12/31/2020-    Hyperglycemia due to type 1 diabetes mellitus ([address] 11/10/2015    Hyperlipidemia 11/10/2015    ICD (implantable cardioverter-defibrillator) in place 06/16/2022    Monomorphic ventricular tachycardia 12/31/2020    Myocardial infarction [facility]) 1999 Myocardial infarction ([facility] 11/10/2015    Obesity 11/10/2015    Orthostatic hypotension 11/10/2015    Osteoarthritis 11/10/2015    Peripheral vascular disease ([facility] 11/10/2015    PNA (pneumonia) 2019    PVC (premature ventricular contraction)     Rash 2545    Seroma complicating a procedure 10/02/2014    Sleep apnea     cpap    Toe laceration     Type 2 diabetes with nephropathy ([facility]     Uncontrolled type 2 diabetes mellitus 11/10/2015    Vertiginous syndromes and other disorders of vestibular system 11/10/2015     Social History     Socioeconomic History    Marital status:    Tobacco Use    Smoking status: Former     Packs/day: 1.00     Types: Cigarettes     Quit date: 10/2/2011     Years since quittin.0    Smokeless tobacco: Current   Substance and Sexual Activity    Alcohol use: Yes     Alcohol/week: 0.0 standard drinks    Drug use: No   Social History Narrative    Lives with wife     Past Surgical History:   Procedure Laterality Date    CARDIAC CATHETERIZATION  2018    LV EF=40%. LM:nl. LAD:30-40% mid stenosis. LCX OM1:95% stenosis. RCA:100% occlusion at RV branch.     CORONARY ANGIOPLASTY WITH STENT PLACEMENT  2018    LCX OM1:2.5 x 12 Giuseppe RAKESH    CORONARY ANGIOPLASTY WITH STENT PLACEMENT      MS ABDOMEN SURGERY PROC UNLISTED      abdominal cyst removed    MS CARDIAC SURG PROCEDURE UNLIST      stents x3    SPINE SURGERY N/A 2022    LUMBAR 2, LUMBAR 4 KYPHOPLASTY AND ANY OTHER INDICATED LEVELS performed by Jackie Guzman MD at [facility] MAIN OR    VASCULAR SURGERY  14    Repair of right common femoral artery     VASCULAR SURGERY  14    tiffanie femoral endarterectomy     Current Outpatient Medications   Medication Sig Dispense Refill    valsartan (DIOVAN) 160 MG tablet Take 1 tablet by mouth daily 90 tablet 3    guaiFENesin 1200 MG TB12 Take 1,200 mg by mouth 2 times daily as needed (congested cough) 180 tablet 1    glipiZIDE (GLUCOTROL XL) 10 MG extended release tablet Take 1 tablet by mouth daily 30 tablet 0    furosemide (LASIX) 20 MG tablet Take 1 tablet by mouth in the morning. 60 tablet 3    pantoprazole (PROTONIX) 40 MG tablet Take 1 tablet by mouth in the morning and 1 tablet in the evening. Take before meals. 30 tablet 3    rosuvastatin (CRESTOR) 10 MG tablet TAKE 1 TABLET BY MOUTH EVERY DAY 90 tablet 0    tiZANidine (ZANAFLEX) 2 MG tablet Take 1 tablet by mouth every 8 hours as needed (muscle spasms) 30 tablet 1    fluticasone-salmeterol (ADVAIR) 250-50 MCG/ACT AEPB diskus inhaler Inhale 1 puff into the lungs in the morning and at bedtime      albuterol sulfate  (90 Base) MCG/ACT inhaler USE 2 PUFFS BY INHALATION 4 TIMES DAILY AS NEEDED FOR WHEEZING OR SHORTNESS OF BREATH.      amiodarone (CORDARONE) 200 MG tablet Take 200 mg by mouth daily      apixaban (ELIQUIS) 5 MG TABS tablet Take 5 mg by mouth every 12 hours      ascorbic acid (VITAMIN C) 500 MG tablet Take 500 mg by mouth      carvedilol (COREG) 25 MG tablet Take 25 mg by mouth 2 times daily (with meals)      Cholecalciferol 50 MCG (2000 UT) TABS Take 2,000 Units by mouth      clopidogrel (PLAVIX) 75 MG tablet Take 75 mg by mouth daily      fluticasone (FLONASE) 50 MCG/ACT nasal spray USE 1 OR 2 SPRAYS IN EACH NOSTRIL EVERY DAY. glucosamine-chondroitin 750-600 MG TABS tablet Take by mouth 2 times daily      latanoprost (XALATAN) 0.005 % ophthalmic solution Apply 1 drop to eye      magnesium oxide (MAG-OX) 400 (240 Mg) MG tablet TAKE 1 TABLET BY MOUTH EVERY DAY      montelukast (SINGULAIR) 10 MG tablet TAKE 1 TABLET BY MOUTH EVERY DAY AT BEDTIME      nitroGLYCERIN (NITROSTAT) 0.4 MG SL tablet Place 0.4 mg under the tongue      polyethylene glycol (GLYCOLAX) 17 GM/SCOOP powder Take 17 g by mouth daily       No current facility-administered medications for this visit.      Health Maintenance   Topic Date Due    DTaP/Tdap/Td vaccine (1 - Tdap) Never done    Low dose CT lung screening  Never done    Pneumococcal 65+ years Vaccine (3 - PPSV23 or PCV20) 10/17/2016    COVID-19 Vaccine (3 - Booster for Moderna series) 09/15/2021    Diabetic retinal exam  05/18/2022    Flu vaccine (1) 08/01/2022    Diabetic foot exam  09/21/2022    A1C test (Diabetic or Prediabetic)  02/10/2023    Lipids  03/05/2023    Annual Wellness Visit (AWV)  03/24/2023    Prostate Specific Antigen (PSA) Screening or Monitoring  05/18/2023    Depression Screen  10/04/2023    Shingles vaccine  Completed    Hepatitis C screen  Completed    Hepatitis A vaccine  Aged Out    Hib vaccine  Aged Out    Meningococcal (ACWY) vaccine  Aged Out     Family History   Problem Relation Age of Onset    Breast Cancer Mother     Hypertension Mother     Other Father         DIVERTICULITIS    Crohn's Disease Sister     Lung Disease Brother         mesothioloma    Diabetes Sister     Heart Attack Father     Heart Disease Father     Stroke Mother              Review of Systems  Review of Systems   Constitutional:  Positive for appetite change. Negative for chills and fever. Respiratory:  Negative for shortness of breath. Cardiovascular:  Negative for chest pain and leg swelling. Gastrointestinal:  Positive for constipation (at times, normal bm once weekly, has decreased miralax, pasing gas). Negative for abdominal pain, nausea and vomiting. Musculoskeletal:  Positive for back pain (improving) and gait problem (becoming more ambulatory). Skin:  Positive for wound (left heel). BP 99/53  Height 6'1  Weight 242 pounds  62  SPO2 94%  Respiratory rate 17    Physical Exam    Physical Exam  Vitals and nursing note reviewed. Constitutional:       General: He is not in acute distress. Appearance: He is not ill-appearing, toxic-appearing or diaphoretic. Neck:      Vascular: No carotid bruit. Cardiovascular:      Rate and Rhythm: Regular rhythm. Heart sounds: No murmur heard. Pulmonary:      Effort: No respiratory distress.       Breath (2000 UT) TABS Take 2,000 Units by mouth      clopidogrel (PLAVIX) 75 MG tablet Take 75 mg by mouth daily      fluticasone (FLONASE) 50 MCG/ACT nasal spray USE 1 OR 2 SPRAYS IN EACH NOSTRIL EVERY DAY. glucosamine-chondroitin 750-600 MG TABS tablet Take by mouth 2 times daily      latanoprost (XALATAN) 0.005 % ophthalmic solution Apply 1 drop to eye      magnesium oxide (MAG-OX) 400 (240 Mg) MG tablet TAKE 1 TABLET BY MOUTH EVERY DAY      montelukast (SINGULAIR) 10 MG tablet TAKE 1 TABLET BY MOUTH EVERY DAY AT BEDTIME      nitroGLYCERIN (NITROSTAT) 0.4 MG SL tablet Place 0.4 mg under the tongue      polyethylene glycol (GLYCOLAX) 17 GM/SCOOP powder Take 17 g by mouth daily       No current facility-administered medications for this visit. 1. Cellulitis of left lower extremity  Concerning for MRSA infection with erythema and foul odor. With his recent history of prolonged hospitalization and persistent discitis on prolonged antibiotics may require IV antibiotic therapy. Urgent referral placed today for wound clinic evaluation. Appreciate wound clinic's expertise and evaluation. Cultures obtained, will start on doxycycline today to treat presumed MRSA infection. Knows to go to the emergency department for for any rapidly expanding redness which was marked today, fevers or chills, nausea or vomiting.  - Culture, Wound Aerobic Only; Future  - Culture, Anaerobic; Future  - doxycycline hyclate (VIBRA-TABS) 100 MG tablet; Take 1 tablet by mouth 2 times daily for 10 days  Dispense: 20 tablet; Refill: 0  - [facility]  - Culture, Anaerobic  - Culture, Wound Aerobic Only    2. Primary hypertension  Has cut back valsartan and stopped amlodipine. Talked with Dr. Annika Matamoros and will decrease Valsartan to 40 mg daily. Called and updated family. Continue other medications. Will maintain close follow up.   - valsartan (DIOVAN) 80 MG tablet; Take 0.5 tablets by mouth daily  Dispense: 90 tablet;  Refill: 3    3. Discitis of lumbar region  Persistent findings on MRI< appreciate ID expertise. - External Referral To Infectious Disease    4. Iron deficiency anemia, unspecified iron deficiency anemia type  - CBC with Auto Differential; Future  - CBC with Auto Differential    5. Elevated ALT measurement  - Comprehensive Metabolic Panel;  Future  - Comprehensive Metabolic Panel        Hannah Whitten NP, APRN - CNP

## 2022-10-20 ENCOUNTER — TELEPHONE (OUTPATIENT)
Dept: INTERNAL MEDICINE CLINIC | Facility: CLINIC | Age: 77
End: 2022-10-20

## 2022-10-20 DIAGNOSIS — Z95.810 ICD (IMPLANTABLE CARDIOVERTER-DEFIBRILLATOR) IN PLACE: Primary | ICD-10-CM

## 2022-10-20 DIAGNOSIS — I25.5 ISCHEMIC CARDIOMYOPATHY: ICD-10-CM

## 2022-10-20 DIAGNOSIS — I48.0 PAROXYSMAL ATRIAL FIBRILLATION (HCC): ICD-10-CM

## 2022-10-20 DIAGNOSIS — I47.20 VT (VENTRICULAR TACHYCARDIA): ICD-10-CM

## 2022-10-20 LAB
ALBUMIN SERPL-MCNC: 3.2 G/DL (ref 3.2–4.6)
ALBUMIN/GLOB SERPL: 1 {RATIO} (ref 0.4–1.6)
ALP SERPL-CCNC: 116 U/L (ref 50–136)
ALT SERPL-CCNC: 78 U/L (ref 12–65)
ANION GAP SERPL CALC-SCNC: 10 MMOL/L (ref 2–11)
AST SERPL-CCNC: 28 U/L (ref 15–37)
BASOPHILS # BLD: 0 K/UL (ref 0–0.2)
BASOPHILS NFR BLD: 0 % (ref 0–2)
BILIRUB SERPL-MCNC: 0.5 MG/DL (ref 0.2–1.1)
BUN SERPL-MCNC: 15 MG/DL (ref 8–23)
CALCIUM SERPL-MCNC: 9.2 MG/DL (ref 8.3–10.4)
CHLORIDE SERPL-SCNC: 101 MMOL/L (ref 101–110)
CO2 SERPL-SCNC: 29 MMOL/L (ref 21–32)
CREAT SERPL-MCNC: 1.2 MG/DL (ref 0.8–1.5)
DIFFERENTIAL METHOD BLD: ABNORMAL
EOSINOPHIL # BLD: 0 K/UL (ref 0–0.8)
EOSINOPHIL NFR BLD: 0 % (ref 0.5–7.8)
ERYTHROCYTE [DISTWIDTH] IN BLOOD BY AUTOMATED COUNT: 17.2 % (ref 11.9–14.6)
GLOBULIN SER CALC-MCNC: 3.3 G/DL (ref 2.8–4.5)
GLUCOSE SERPL-MCNC: 156 MG/DL (ref 65–100)
HCT VFR BLD AUTO: 32.5 % (ref 41.1–50.3)
HGB BLD-MCNC: 9.8 G/DL (ref 13.6–17.2)
IMM GRANULOCYTES # BLD AUTO: 0.1 K/UL (ref 0–0.5)
IMM GRANULOCYTES NFR BLD AUTO: 1 % (ref 0–5)
LYMPHOCYTES # BLD: 1 K/UL (ref 0.5–4.6)
LYMPHOCYTES NFR BLD: 10 % (ref 13–44)
MCH RBC QN AUTO: 30.1 PG (ref 26.1–32.9)
MCHC RBC AUTO-ENTMCNC: 30.2 G/DL (ref 31.4–35)
MCV RBC AUTO: 99.7 FL (ref 82–102)
MONOCYTES # BLD: 0.8 K/UL (ref 0.1–1.3)
MONOCYTES NFR BLD: 8 % (ref 4–12)
NEUTS SEG # BLD: 8.4 K/UL (ref 1.7–8.2)
NEUTS SEG NFR BLD: 81 % (ref 43–78)
NRBC # BLD: 0 K/UL (ref 0–0.2)
PLATELET # BLD AUTO: 273 K/UL (ref 150–450)
PMV BLD AUTO: 10.3 FL (ref 9.4–12.3)
POTASSIUM SERPL-SCNC: 4 MMOL/L (ref 3.5–5.1)
PROT SERPL-MCNC: 6.5 G/DL (ref 6.3–8.2)
RBC # BLD AUTO: 3.26 M/UL (ref 4.23–5.6)
SODIUM SERPL-SCNC: 140 MMOL/L (ref 133–143)
WBC # BLD AUTO: 10.3 K/UL (ref 4.3–11.1)

## 2022-10-20 NOTE — TELEPHONE ENCOUNTER
Mr. Shabana Palacios-  Your labs are stable, hemoglobin is stable. You still have an elevated ALT which is a liver enzymes to continue the decreased dose of amiodarone. We will plan on rechecking these things at your follow-up appointment. Please let me know if you have any new or worsening symptoms. So glad you are seeing wound care tomorrow. Janay    Please add CMP and CBC to follow-up visit reason for visit. Thanks!

## 2022-10-24 ENCOUNTER — TELEPHONE (OUTPATIENT)
Dept: INTERNAL MEDICINE CLINIC | Facility: CLINIC | Age: 77
End: 2022-10-24

## 2022-10-24 ENCOUNTER — HOSPITAL ENCOUNTER (OUTPATIENT)
Dept: WOUND CARE | Age: 77
Discharge: HOME | End: 2022-10-24
Payer: MEDICARE

## 2022-10-24 VITALS
RESPIRATION RATE: 20 BRPM | HEIGHT: 73 IN | HEART RATE: 60 BPM | BODY MASS INDEX: 32.07 KG/M2 | DIASTOLIC BLOOD PRESSURE: 78 MMHG | SYSTOLIC BLOOD PRESSURE: 139 MMHG | WEIGHT: 242 LBS

## 2022-10-24 DIAGNOSIS — E11.42 DIABETIC POLYNEUROPATHY ASSOCIATED WITH TYPE 2 DIABETES MELLITUS (HCC): ICD-10-CM

## 2022-10-24 DIAGNOSIS — Z98.890 H/O ENDARTERECTOMY: ICD-10-CM

## 2022-10-24 DIAGNOSIS — I70.213 ATHEROSCLEROSIS OF NATIVE ARTERY OF BOTH LOWER EXTREMITIES WITH INTERMITTENT CLAUDICATION (HCC): ICD-10-CM

## 2022-10-24 DIAGNOSIS — I73.9 PAD (PERIPHERAL ARTERY DISEASE) (HCC): ICD-10-CM

## 2022-10-24 DIAGNOSIS — I70.90: ICD-10-CM

## 2022-10-24 DIAGNOSIS — E11.51 DM (DIABETES MELLITUS) TYPE II, CONTROLLED, WITH PERIPHERAL VASCULAR DISORDER (HCC): ICD-10-CM

## 2022-10-24 DIAGNOSIS — L97.422 NON-PRESSURE CHRONIC ULCER OF LEFT HEEL AND MIDFOOT WITH FAT LAYER EXPOSED (HCC): ICD-10-CM

## 2022-10-24 DIAGNOSIS — R53.81 PHYSICAL DEBILITY: Primary | ICD-10-CM

## 2022-10-24 DIAGNOSIS — Z79.02 ANTIPLATELET OR ANTITHROMBOTIC LONG-TERM USE: ICD-10-CM

## 2022-10-24 LAB
BACTERIA SPEC CULT: ABNORMAL
GRAM STN SPEC: ABNORMAL
GRAM STN SPEC: ABNORMAL
SERVICE CMNT-IMP: ABNORMAL

## 2022-10-24 PROCEDURE — 99204 OFFICE O/P NEW MOD 45 MIN: CPT

## 2022-10-24 PROCEDURE — 99214 OFFICE O/P EST MOD 30 MIN: CPT | Performed by: SURGERY

## 2022-10-24 RX ORDER — GENTAMICIN SULFATE 1 MG/G
OINTMENT TOPICAL ONCE
Status: CANCELLED | OUTPATIENT
Start: 2022-10-24 | End: 2022-10-24

## 2022-10-24 RX ORDER — BACITRACIN, NEOMYCIN, POLYMYXIN B 400; 3.5; 5 [USP'U]/G; MG/G; [USP'U]/G
OINTMENT TOPICAL ONCE
Status: CANCELLED | OUTPATIENT
Start: 2022-10-24 | End: 2022-10-24

## 2022-10-24 RX ORDER — BACITRACIN ZINC AND POLYMYXIN B SULFATE 500; 1000 [USP'U]/G; [USP'U]/G
OINTMENT TOPICAL ONCE
Status: CANCELLED | OUTPATIENT
Start: 2022-10-24 | End: 2022-10-24

## 2022-10-24 RX ORDER — BETAMETHASONE DIPROPIONATE 0.05 %
OINTMENT (GRAM) TOPICAL ONCE
Status: CANCELLED | OUTPATIENT
Start: 2022-10-24 | End: 2022-10-24

## 2022-10-24 RX ORDER — LIDOCAINE HYDROCHLORIDE 40 MG/ML
SOLUTION TOPICAL ONCE
Status: CANCELLED | OUTPATIENT
Start: 2022-10-24 | End: 2022-10-24

## 2022-10-24 RX ORDER — CLOBETASOL PROPIONATE 0.5 MG/G
OINTMENT TOPICAL ONCE
Status: CANCELLED | OUTPATIENT
Start: 2022-10-24 | End: 2022-10-24

## 2022-10-24 RX ORDER — LIDOCAINE 40 MG/G
CREAM TOPICAL ONCE
Status: CANCELLED | OUTPATIENT
Start: 2022-10-24 | End: 2022-10-24

## 2022-10-24 RX ORDER — LIDOCAINE HYDROCHLORIDE 20 MG/ML
JELLY TOPICAL ONCE
Status: CANCELLED | OUTPATIENT
Start: 2022-10-24 | End: 2022-10-24

## 2022-10-24 RX ORDER — LIDOCAINE 50 MG/G
OINTMENT TOPICAL ONCE
Status: CANCELLED | OUTPATIENT
Start: 2022-10-24 | End: 2022-10-24

## 2022-10-24 RX ORDER — GINSENG 100 MG
CAPSULE ORAL ONCE
Status: CANCELLED | OUTPATIENT
Start: 2022-10-24 | End: 2022-10-24

## 2022-10-24 NOTE — FLOWSHEET NOTE
10/24/22 0848   Wound 09/09/22 Heel Left stage 2   Date First Assessed/Time First Assessed: 09/09/22 1535   Present on Hospital Admission: Yes  Primary Wound Type: Pressure Injury  Location: Heel  Wound Location Orientation: Left  Wound Description (Comments): stage 2   Wound Image    Wound Etiology Diabetic Martinez 2   Dressing Status Old drainage noted   Wound Cleansed Cleansed with saline   Dressing/Treatment Antibacterial ointment   Offloading for Diabetic Foot Ulcers Other (comment)  (Wheelchair)   Wound Length (cm) 4.3 cm   Wound Width (cm) 3 cm   Wound Depth (cm) 0.2 cm   Wound Surface Area (cm^2) 12.9 cm^2   Change in Wound Size % (l*w) -115   Wound Volume (cm^3) 2.58 cm^3   Wound Assessment Eschar moist;Pink/red   Drainage Amount Moderate   Drainage Description Serosanguinous   Odor Mild   Joanna-wound Assessment Fragile;Blanchable erythema   Wound Thickness Description not for Pressure Injury Full thickness   Takes Plavix and Eliquis Daily

## 2022-10-24 NOTE — TELEPHONE ENCOUNTER
----- Message from Latisha Kane sent at 10/21/2022 11:24 AM EDT -----  Subject: Message to Provider    QUESTIONS  Information for Provider? Patient's wife asking for the number of the   infectious disease specialist they were referred to. Please call back with   more information   ---------------------------------------------------------------------------  --------------  Lum Chance INFO  [phone]; OK to leave message on voicemail  ---------------------------------------------------------------------------  --------------  SCRIPT ANSWERS  Relationship to Patient? Other  Representative Name? Lu  Is the Representative on the appropriate HIPAA document in Epic?  Yes

## 2022-10-24 NOTE — TELEPHONE ENCOUNTER
----- Message from Senia Bone MD sent at 10/24/2022  1:27 PM EDT -----  No fracture noted. Kyphoplasty repair noted. Hope you are feeling better.

## 2022-10-24 NOTE — WOUND CARE
Discharge Instructions for  Windy Fairchild  [address] [facility]  [address] [address] [facility], [address]  Phone [phone]   Fax [phone]      NAME:  Flaco Locke OF BIRTH:  1945  MEDICAL RECORD NUMBER:  [id number]  DATE:  10/24/2022    Return Appointment:   1 week with Halley Bro MD      Instructions: Left Lateral Heel:  Cleanse with Normal Saline  Paint with Betadine Daily  Cover with Bandaid/Bordered Foam    Float Heel When Patient in [address]  When in [address] and When Heel is Unable to be [facility]    Vascular Appointment for [facility] [employer] and See Vascular-10:30 on October 25, 2022    Home Health-Center Well for [address] [facility] and Assessment              Should you experience increased redness, swelling, pain, foul odor, size of wound(s), or have a temperature over 101 degrees please contact the [address] [place] [place] [address] Road at [phone] or if after hours contact your primary care physician or go to the hospital emergency department. PLEASE NOTE: IF YOU ARE UNABLE TO OBTAIN WOUND SUPPLIES, CONTINUE TO USE THE SUPPLIES YOU HAVE AVAILABLE UNTIL YOU ARE ABLE TO REACH US. IT IS MOST IMPORTANT TO KEEP THE WOUND COVERED AT ALL TIMES.     Electronically signed Alona Prather RN on 10/24/2022 at 9:29 AM

## 2022-10-24 NOTE — DISCHARGE INSTRUCTIONS
Discharge Instructions for  Winyd Fairchild  [address]  [address], [address]  Phone [phone]   Fax [phone]      NAME:  Alycia Taveras  YOB: 1945  MEDICAL RECORD NUMBER:  [id number]  DATE:  @ED@    Return Appointment:   1 week with David Claudio MD      Instructions:  Left Lateral Heel:  Cleanse with Normal Saline  Paint with Betadine Daily  Cover with Bandaid/Bordered Foam    Float Heel When Patient in [address]  When in [address] and When Heel is Unable to be [email or web address]    Vascular Appointment for [place] and See Vascular-10:30 on October 25, 2022    Home Health-Center Well for [address] [facility]. and Assessment        Should you experience increased redness, swelling, pain, foul odor, size of wound(s), or have a temperature over 101 degrees please contact the [address] [place] [address] Road at [phone] or if after hours contact your primary care physician or go to the hospital emergency department. PLEASE NOTE: IF YOU ARE UNABLE TO OBTAIN WOUND SUPPLIES, CONTINUE TO USE THE SUPPLIES YOU HAVE AVAILABLE UNTIL YOU ARE ABLE TO REACH US. IT IS MOST IMPORTANT TO KEEP THE WOUND COVERED AT ALL TIMES.     Electronically signed Pravin Lugo RN on 10/24/2022 at 9:08 AM

## 2022-10-24 NOTE — PROGRESS NOTES
[address]  Consult Note/H&P/Progress Note      [address] RECORD NUMBER:  [id number]  AGE: 65 y.o. RACE White (non-)  GENDER: male  : 1945  EPISODE DATE:  10/24/2022       Subjective:      CC (Chief Complaint) and HISTORY of PRESENT ILLNESS (HPI):    Marcus Spaulding is a 65 y.o. male who presents today for wound/ulcer evaluation. He presents on 10/24/2022 with an ulcer wound on the left heel that has been present for several months. He was in his usual state of poor health when in July he had back surgery which led to a complicated abscess, prolonged hospital stay, and rehabilitation stay which ended just a few weeks ago. He is currently at home. He has significant debility. He is diabetic and has significant peripheral vascular disease with history of bilateral femoral endarterectomies. These were performed several years ago by Dr. Mccarthy. ABIs were performed in July just prior to his surgery with the JOVANA on the left of 0.6 with plan for follow-up this coming January to monitor. He has been lying in bed without use of Rooke boots and has developed an ulcer on the left heel. He has a history of MRSA infection in his back and was placed on a course of doxycycline. There is no evidence of active cellulitis today. He has had no imaging. He has not had repeat vascular evaluation. There is some description of pain in his leg at rest.  He is not active enough to discern any possible claudication. He is anticoagulated on both Eliquis and Plavix. There is no current dressing care plan.       This information was obtained from the patient  The following HPI elements were documented for the patient's wound:  Location: Left heel  Duration: 2022  Severity: Fat layer exposed  Context: Limited activity, much lying in bed, PVD, DM, no offloading  Modifying Factors:  Nothing makes better or worse  Quality: Full-thickness, painful  Timing: Constant  Associated Signs and Symptoms: Tissue loss and possible bout of cellulitis      Ulcer Identification:  Ulcer Type: Diabetic foot ulcer left heel with fat layer exposed  Contributing Factors: Inadequate offloading, PVD, anticoagulated          PAST MEDICAL HISTORY  Past Medical History:   Diagnosis Date    Acute respiratory failure with hypoxia ([address] 10/23/2014    MINI (acute kidney injury) ([address] 09/15/2014    MINI (acute kidney injury) ([address]     MINI (acute kidney injury) ([address]     Allergic rhinitis 11/10/2015    Asthma 11/10/2015    Benign essential hypertension 11/10/2015    Benign neoplasm of colon 11/10/2015    CAD (coronary artery disease) 11/10/2015    CAD (coronary artery disease) 1998, 1999    mix2, 3 stents [id number]    CAP (community acquired pneumonia) 12/31/2020    Cardiomyopathy ([address] 40/34/5897    Complicated wound infection 11/10/2015    COPD (chronic obstructive pulmonary disease) with emphysema ([address] 11/10/2015    COPD exacerbation ([address] 10/12/2011    COVID-19 12/31/2020    Diabetes mellitus type 2, controlled ([address] 11/10/2015    Diabetic neuropathy ([address] 11/10/2015    Disruption of wound, unspecified 10/09/2014    Encounter for long-term (current) use of other high-risk medications 11/10/2015    Extremity atherosclerosis with intermittent claudication ([address] 11/10/2015    H/O endarterectomy 11/10/2015    Hiatal hernia 11/10/2015    History of 2019 novel coronavirus disease (COVID-19)     12/31/2020-    Hyperglycemia due to type 1 diabetes mellitus ([address] 11/10/2015    Hyperlipidemia 11/10/2015    ICD (implantable cardioverter-defibrillator) in place 06/16/2022    Monomorphic ventricular tachycardia 12/31/2020    Myocardial infarction [facility]) 1999    Myocardial infarction ([address] 11/10/2015    Obesity 11/10/2015    Orthostatic hypotension 11/10/2015    Osteoarthritis 11/10/2015    Peripheral vascular disease ([address] 11/10/2015    PNA (pneumonia) 2019    PVC (premature ventricular contraction)     Rash     Seroma complicating a procedure 10/02/2014    Sleep apnea     cpap    Toe laceration     Type 2 diabetes with nephropathy ([facility]     Uncontrolled type 2 diabetes mellitus 11/10/2015    Vertiginous syndromes and other disorders of vestibular system 11/10/2015        PAST SURGICAL HISTORY  Past Surgical History:   Procedure Laterality Date    CARDIAC CATHETERIZATION  2018    LV EF=40%. LM:nl. LAD:30-40% mid stenosis. LCX OM1:95% stenosis. RCA:100% occlusion at RV branch. CORONARY ANGIOPLASTY WITH STENT PLACEMENT  2018    LCX OM1:2.5 x 12 Giuseppe RAKESH    CORONARY ANGIOPLASTY WITH STENT PLACEMENT      [place] ABDOMEN SURGERY PROC UNLISTED      abdominal cyst removed    [place] CARDIAC SURG PROCEDURE UNLIST      stents x3    SPINE SURGERY N/A 2022    LUMBAR 2, LUMBAR 4 KYPHOPLASTY AND ANY OTHER INDICATED LEVELS performed by Hyacinth Melendez MD at [facility] MAIN OR    VASCULAR SURGERY  14    Repair of right common femoral artery     VASCULAR SURGERY  14    tiffanie femoral endarterectomy       FAMILY HISTORY  Family History   Problem Relation Age of Onset    Breast Cancer Mother     Hypertension Mother     Other Father         DIVERTICULITIS    Crohn's Disease Sister     Lung Disease Brother         mesothioloma    Diabetes Sister     Heart Attack Father     Heart Disease Father     Stroke Mother        SOCIAL HISTORY  Social History     Tobacco Use    Smoking status: Former     Packs/day: 1.00     Types: Cigarettes     Quit date: 10/2/2011     Years since quittin.0    Smokeless tobacco: Current   Substance Use Topics    Alcohol use:  Yes     Alcohol/week: 0.0 standard drinks    Drug use: No       ALLERGIES  Allergies   Allergen Reactions    Azithromycin Hives    Oxaprozin Other (See Comments)     ELEVATED BLOOD PRESSURE    Oxycodone Anxiety       MEDICATIONS  Current Outpatient Medications on File Prior to Encounter Medication Sig Dispense Refill    traMADol (ULTRAM) 50 MG tablet Take 50 mg by mouth every 6 hours as needed for Pain. Taking 1/2 every morning      acetaminophen (TYLENOL) 500 MG tablet Take 500 mg by mouth every 6 hours as needed for Pain Taking 1/2 two times daily      doxycycline hyclate (VIBRA-TABS) 100 MG tablet Take 1 tablet by mouth 2 times daily for 10 days 20 tablet 0    valsartan (DIOVAN) 80 MG tablet Take 0.5 tablets by mouth daily 30 tablet 5    guaiFENesin 1200 MG TB12 Take 1,200 mg by mouth 2 times daily as needed (congested cough) 180 tablet 1    glipiZIDE (GLUCOTROL XL) 10 MG extended release tablet Take 1 tablet by mouth daily 30 tablet 0    furosemide (LASIX) 20 MG tablet Take 1 tablet by mouth in the morning. 60 tablet 3    pantoprazole (PROTONIX) 40 MG tablet Take 1 tablet by mouth in the morning and 1 tablet in the evening. Take before meals. (Patient not taking: Reported on 10/19/2022) 30 tablet 3    [DISCONTINUED] amLODIPine (NORVASC) 5 MG tablet Take 1 tablet by mouth in the morning. 30 tablet 3    [DISCONTINUED] QUEtiapine (SEROQUEL) 25 MG tablet Take 1 tablet by mouth in the morning.  60 tablet 3    rosuvastatin (CRESTOR) 10 MG tablet TAKE 1 TABLET BY MOUTH EVERY DAY 90 tablet 0    tiZANidine (ZANAFLEX) 2 MG tablet Take 1 tablet by mouth every 8 hours as needed (muscle spasms) (Patient not taking: Reported on 10/19/2022) 30 tablet 1    fluticasone-salmeterol (ADVAIR) 250-50 MCG/ACT AEPB diskus inhaler Inhale 1 puff into the lungs in the morning and at bedtime      albuterol sulfate  (90 Base) MCG/ACT inhaler USE 2 PUFFS BY INHALATION 4 TIMES DAILY AS NEEDED FOR WHEEZING OR SHORTNESS OF BREATH.      amiodarone (CORDARONE) 200 MG tablet Take 200 mg by mouth daily      apixaban (ELIQUIS) 5 MG TABS tablet Take 5 mg by mouth every 12 hours      ascorbic acid (VITAMIN C) 500 MG tablet Take 500 mg by mouth      carvedilol (COREG) 25 MG tablet Take 25 mg by mouth 2 times daily (with meals) Cholecalciferol 50 MCG (2000 UT) TABS Take 2,000 Units by mouth      clopidogrel (PLAVIX) 75 MG tablet Take 75 mg by mouth daily      fluticasone (FLONASE) 50 MCG/ACT nasal spray USE 1 OR 2 SPRAYS IN EACH NOSTRIL EVERY DAY. latanoprost (XALATAN) 0.005 % ophthalmic solution Apply 1 drop to eye      magnesium oxide (MAG-OX) 400 (240 Mg) MG tablet TAKE 1 TABLET BY MOUTH EVERY DAY      montelukast (SINGULAIR) 10 MG tablet TAKE 1 TABLET BY MOUTH EVERY DAY AT BEDTIME      nitroGLYCERIN (NITROSTAT) 0.4 MG SL tablet Place 0.4 mg under the tongue      polyethylene glycol (GLYCOLAX) 17 GM/SCOOP powder Take 17 g by mouth daily      [DISCONTINUED] metFORMIN (GLUCOPHAGE) 500 MG tablet TAKE TWO TABLETS BY MOUTH 2 TIMES A DAY       No current facility-administered medications on file prior to encounter. REVIEW OF SYSTEMS  The patient has no difficulty with chest pain or shortness of breath. No fever or chills. Comprehensive review of systems was otherwise unremarkable except as noted above. Objective:   Physical Exam:   Recent vitals (if inpt):  Patient Vitals for the past 24 hrs:   BP Pulse Resp Height Weight   10/24/22 0816 139/78 60 20 -- --   10/24/22 0814 -- -- -- 6' 1\" (1.854 m) 242 lb (109.8 kg)       /78   Pulse 60   Resp 20   Ht 6' 1\" (1.854 m)   Wt 242 lb (109.8 kg)   BMI 31.93 kg/m²   Wt Readings from Last 3 Encounters:   10/24/22 242 lb (109.8 kg)   10/19/22 242 lb (109.8 kg)   10/18/22 242 lb (109.8 kg)     Constitutional: Alert, oriented, cooperative patient in no acute distress; appears stated age;  General appearance is within normal limits for wound care patient population. See the today's recorded vitals signs and constitutional data. Eyes: Pupils equal; Sclera are clear. EOMs intact; The eyes appear to track and move normally. The sclera are not injected. The conjunctive are clear. The eyelids are normal. There is no scleral icterus.    ENMT: There are no obvious external ear, nose, lip or mouth lesions. Nares normal; Neck: Overall contour of the neck is normal with no obvious neck masses. Gross hearing is within normal limits. No obvious neck masses  Resp:  Breathing is non-labored; normal rate and effort; no audible wheezing. CV: RRR;  no JVD; No evidence of cyanosis of the upper extremities. The extremities are perfused without embolic sign, splinter hemorrhages, or petechia. GI: soft and non-distended without acute abnormality noted. Musculoskeletal: unremarkable with normal function. No embolic signs or cyanosis. Neuro:  Oriented; moves all 4; no focal deficits  Psychiatric: Judgement and insight are within normal limits for the wound care population of patients. Patient is oriented to person, place, and time. Recent and remote memory are within normal limits. Mood and affect are within normal limits. Integumentary (Skin/Wounds)  See inspection of wound(s) below. There are no other skin areas of palpable concern. See wound center documentation including photos in the [address] Wound Template made part of this record by reference.          Wound Care Documentation:    Wound 09/09/22 Heel Left;Lateral (Active)   Wound Image   10/24/22 0848   Wound Etiology Diabetic Martinez 2 10/24/22 0848   Dressing Status Old drainage noted 10/24/22 0848   Wound Cleansed Cleansed with saline 10/24/22 0848   Dressing/Treatment Antibacterial ointment 10/24/22 0848   Offloading for Diabetic Foot Ulcers Other (comment) 10/24/22 0848   Wound Length (cm) 4.3 cm 10/24/22 0848   Wound Width (cm) 3 cm 10/24/22 0848   Wound Depth (cm) 0.2 cm 10/24/22 0848   Wound Surface Area (cm^2) 12.9 cm^2 10/24/22 0848   Change in Wound Size % (l*w) -115 10/24/22 0848   Wound Volume (cm^3) 2.58 cm^3 10/24/22 0848   Wound Assessment Eschar moist;Pink/red 10/24/22 0848   Drainage Amount Moderate 10/24/22 0848   Drainage Description Serosanguinous 10/24/22 0848   Odor Mild 10/24/22 0848 Joanna-wound Assessment Fragile;Blanchable erythema 10/24/22 0848   Wound Thickness Description not for Pressure Injury Full thickness 10/24/22 0848   Number of days: 44       Wound 09/13/22 Buttocks moisture skin damage (Active)   Number of days: 40        Initial WC visit 10/24/2022      Amount and/or Complexity of Data Reviewed and Analyzed:  I reviewed and analyzed all of the unique labs and radiologic studies that are shown below as well as any that are in the HPI, and any that are in the expanded problem list below  *Each unique test, order, or document contributes to the combination of 2 or combination of 3 in Category 1 below. I also independently reviewed radiology images for studies I described above in the HPI or studies I have ordered. For this visit I also reviewed old records and prior notes. No results for input(s): WBC, HGB, PLT, NA, K, CL, CO2, BUN, CREA, GLU, INR, APTT, ALT, AML, AML, LCAD, PCO2, PO2, HCO3 in the last 72 hours. Invalid input(s): PTP, TBIL, TBILI, CBIL, SGOT, GPT, AP, LPSE, NH4, TROPT, TROIQ,  PH  No results for input(s): INR, APTT, ALT, AML in the last 72 hours.     Invalid input(s): PTP, TBIL, TBILI, CBIL, SGOT, GPT, AP, LPSE    Most recent blood counts (CBC) review  Lab Results   Component Value Date    WBC 10.3 10/19/2022    HGB 9.8 (L) 10/19/2022    HCT 32.5 (L) 10/19/2022    MCV 99.7 10/19/2022     10/19/2022     Most recent chemistry (BMP) test review   Lab Results   Component Value Date/Time     10/19/2022 02:22 PM    K 4.0 10/19/2022 02:22 PM     10/19/2022 02:22 PM    CO2 29 10/19/2022 02:22 PM    BUN 15 10/19/2022 02:22 PM    CREATININE 1.20 10/19/2022 02:22 PM    GLUCOSE 156 10/19/2022 02:22 PM    CALCIUM 9.2 10/19/2022 02:22 PM      Most recent Liver enzyme test review  Lab Results   Component Value Date    ALT 78 (H) 10/19/2022    AST 28 10/19/2022    ALKPHOS 116 10/19/2022    BILITOT 0.5 10/19/2022     Most recent coagulation (coags) review  @lastinr@  Lab Results   Component Value Date/Time    INR 1.3 07/30/2022 09:53 AM    APTT 63.6 08/02/2022 05:41 AM    APTT 24.3 03/04/2022 03:57 PM       Diabetic assessment  Hemoglobin A1C   Date Value Ref Range Status   08/10/2022 6.0 (H) 4.8 - 5.6 % Final       Nutritional assessment screen to assess wound healing ability:  Lab Results   Component Value Date    LABALBU 3.2 10/19/2022     No results found for: TP, ALBR, ALB    Xray Result (most recent):  XR PELVIS (1-2 VIEWS) 10/18/2022    Narrative  Pelvis single view 10/18/2022    CLINICAL HISTORY: Fractured lower back. FINDINGS:  2 similar frontal views of the pelvis are submitted for evaluation. No abnormal  widening is seen of the sacroiliac joints or pubic symphysis. No acute displaced  fracture is seen of the pelvic ring. Assessment of the lower lumbar spine and  proximal femurs is limited by the lack of lateral views. Kyphoplasty repair is  seen at the L4 vertebral body level with some vertebral body height loss  adjustment at this level. No clear acute changes of the bilateral hips are seen. Relatively advanced atherosclerotic changes are seen of regional arterial  structures. Impression  1. Kyphoplasty repair at L4. No acute displaced fracture is seen of the bony  pelvis. CT Result (most recent):  CT FNA WITH IMAGING GUIDED 09/14/2022    Narrative  PROCEDURE: CT-guided Large Bore Needle Aspiration and Core Biopsy. Procedural Personnel  Attending physician(s): Ariel Durham M.D. Pre-procedure diagnosis: 76-year-old man with history of vertebral compression  fracture status post kyphoplasty 7/19/2022. Postoperative complications include  probable osteomyelitis and perivertebral abscess/psoas muscle abscess. CT-guided perivertebral fluid aspiration 8/1/2022 returned 2-3 cc of purulent  fluid that demonstrated high white blood cell count but no bacterial growth in  culture.   Patient has completed 6 weeks of antibiotic therapy yet continues to  complain of low back pain and hip pain. Patient is currently in an antibiotic  holiday. Repeat MRI 9/6/2022 demonstrates possible small fluid collection  versus phlegmon adjacent to the L4 vertebral body or within the adjacent psoas  muscle. There is multilevel lumbar degenerative disc disease. Eliquis and  Plavix have been held in anticipation of fluid aspiration today. Today, the  patient complains only of hip pain. Post-procedure diagnosis: Same  Indication: Obtain specimen for microbiology evaluation  Previous biopsy of same target (QCDR): Yes    Complications: No immediate complications. Impression  CT-guided Needle aspiration and core biopsy of of the phlegmon  adjacent to the left side of L3-L4 disc and L4 vertebral body. The 1st needle  placement adjacent to the L4 vertebral body returned no fluid, therefore a core  biopsy was obtained. The 2nd needle placement adjacent to the L3-L4 vertebral  body initially returned no fluid, and therefore a few cc of inoculum saline was  administered and then aspirated. Plan:  Specimen(s) sent to the Microbiology department for evaluation. One hour  bedrest.  Resume Plavix tomorrow. Resume Eliquis in 48 hours. _______________________________________________________________  Technique: All CT scans at this facility are performed using dose  reduction/dose modulation techniques, as appropriate to the performed exam,  including the following:  Automated Exposure Control; Adjustment of the MA  and/or kF according to patient size (this includes techniques or standardized  protocols for targeted exams where dose is matched to indication/reason for  exam); and Use of Iterative Reconstruction Technique.     PROCEDURE SUMMARY:  - Percutaneous CT-guided Coaxial Core Needle Biopsy and Fluid Aspiration    PROCEDURE DETAILS:    Pre-procedure  Reference imaging for biopsy target: MRI 9/6/2022, 7/29/2022, 6/2/2022 as well  as CT 7/27/2022    Consent: Informed written and oral consent for the procedure was obtained after  explanation of risks (including, but not limitted to:  hemorrhage, infection,  visceral injury, nondiagnostic biopsy) benefits and alternatives. The patient's  questions were answered to their satisfaction. They stated understanding and  requested that we proceed. Final verification:  A time-out identifying the patient and planned procedure  was performed prior to this procedure. Preparation: (MIPS) Maximal sterile barrier technique (including:  cap, mask,  sterile gown, sterile gloves, sterile sheet, hand hygiene, and cutaneous  antisepsis) was used. Anesthesia/sedation  Level of anesthesia/sedation: Moderate sedation (conscious sedation)  Anesthesia/sedation administered by: Independent trained observer under  attending supervision with continuous monitoring of the patient?s level of  consciousness and physiologic status  Total intra-service sedation time (minutes): 27    Imaging prior to biopsy  The patient was positioned prone. Initial imaging was performed using  noncontrast CT. Biopsy target:  - Maximal diameter (cm): 0.7 -  Location: soft tissue density to the left of the L4 vertebral body. Other findings: Less distinct soft tissue density to the left of the L3-L4 disc  space. Biopsy  Local anesthesia was administered. Under CT guidance, the coaxial biopsy system  was advanced to the target and aspiration was attempted but no fluid was  returned. Therefore, through the coaxial needle, an 18-gauge core biopsy was  obtained and then placed into a sterile container. Coaxial needle: 17 gauge  Core needle biopsy device: [employer]  Core needle size: 18 gauge  Number of core specimens: 1    Attention was then turned to the subtle soft tissue density adjacent to the  L3-L4 disc. The coaxial needle was reassembled and then redirected using  intermittent CT scan imaging to this 2nd soft tissue density.   The 17-gauge  coaxial needle was aspirated, returning no fluid. Therefore, 3 cc of inoculum  saline was administered and then aspirated as specimen. Large bore needle aspiration device: [employer]  Fine needle size: 17-gauge  Number of FNA specimens: 1    Needle removal  The biopsy needle was removed and a sterile dressing was applied. Tract embolization: None    Imaging following biopsy  Immediate post-biopsy imaging was not performed. Imaging modality: Not applicable. Post-biopsy imaging findings: Not applicable    Contrast  Contrast agent: None  Contrast volume (mL): 0    Radiation Dose  CT dose length product (mGy-cm):  957.09    Additional Details  Additional description of procedure: None  Equipment details: None  Specimens removed: Microbiology  Estimated blood loss (mL): Less than 10  Standardized report: [email or web address]    Attestation  Signer name: Ann Marie Davis M.D. I attest that I personally performed the entire procedure. I reviewed the stored  images and agree with the report as written. 07/06/22    VAS DUP LOWER EXT ARTERIES BILATERAL W JOVANA 07/07/2022  7:57 AM (Final)    Interpretation Summary    Right lower extremity: The JOVANA (ankle-brachial index) is 0.78 and is abnormal. The lower extremity arterial duplex reveals an occlusion of the proximal superficial femoral artery with reconstitution at the distal proximal superficial femoral artery. There is monophasic flow in the GILBERTO, PTA and peroneal arteries at the ankle. The great toe pressure is 64mmHg. Left lower extremity: Right lower extremity: The JOVANA (ankle-brachial index) is 0.60 and is abnormal. The lower extremity arterial duplex reveals an occlusion of the proximal superficial femoral artery with reconstitution at the below knee popliteal artery. There is monophasic flow in the GILBERTO, PTA and peroneal arteries at the ankle. The great toe pressure is 63mmHg.     The abdominal aorta was evaluated and measured 2.8cm x 2.9cm at its maximum diameter, no aneurysm visualized on limited evaluation of abdominal aorta. Signed by: Margarita Cobos MD on 7/7/2022  7:57 AM      Admission date (for inpatients): 10/24/2022   Day of Surgery  * No procedures listed *    Assessment:      Problem List Items Addressed This Visit          Circulatory    Arterial degeneration    Atherosclerosis of native arteries of extremity with intermittent claudication ([address]    PAD (peripheral artery disease) ([address]    Relevant Orders    AMBULATORY REFERRAL TO PERIPHERAL VASCULAR RESISTANCE    DM (diabetes mellitus) type II, controlled, with peripheral vascular disorder (HCC)       Endocrine    Diabetic neuropathy ([address]    Relevant Orders    AMBULATORY REFERRAL TO PERIPHERAL VASCULAR RESISTANCE       Other    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 *  Resolved Problems:    * No resolved hospital problems.  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 ([address] 10/24/2022     Priority: High    Antiplatelet or antithrombotic long-term use 10/24/2022     Priority: High     Eliquis and Plavix      Chest pain 12/05/2018     Priority: High    Lumbar discitis 09/08/2022     Priority: Medium    Psoas muscle abscess ([address] 09/08/2022     Priority: Medium    Physical debility 08/26/2022     Priority: Medium    General weakness 07/27/2022     Priority: Medium    Acute cystitis without hematuria 07/27/2022     Priority: Medium    Low back pain without sciatica 07/27/2022     Priority: Medium    Back pain 07/27/2022     Priority: Medium    DNI (do not intubate) 07/27/2022     Priority: Medium    Elevated liver enzymes 07/27/2022     Priority: Medium    Anemia 07/27/2022     Priority: Medium    Hyponatremia 07/27/2022     Priority: Medium    ICD (implantable cardioverter-defibrillator) in place 06/16/2022     Priority: Medium    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, vertebra(e), init ([facility] 07/07/2022     Priority: Low     Added automatically from request for surgery [id number]      Atrial fibrillation ([facility] [address] 03/04/2022     Priority: Low    Ventricular tachycardia 03/04/2022     Priority: Low    VT (ventricular tachycardia) 03/04/2022     Priority: Low    Acute metabolic encephalopathy 31/79/1475     Priority: Low    Irregular heart beat 03/02/2021     Priority: Low    PVC's (premature ventricular contractions) 03/02/2021     Priority: Low    Elevated troponin 12/31/2020     Priority: Low    Toe laceration 12/09/2019     Priority: Low    Type 2 diabetes with nephropathy ([facility] [facility] 06/17/2019     Priority: Low    Hypoxia 02/08/2019     Priority: Low    Sepsis ([address] 02/08/2019     Priority: Low    Abnormal nuclear cardiac imaging test 11/27/2018     Priority: Low    Cigarette nicotine dependence in remission 08/20/2018     Priority: Low    Chronic pain of right knee 12/14/2017     Priority: Low    Obstructive sleep apnea 08/11/2017     Priority: Low    Intermittent spinal claudication ([facility] [address] 06/13/2017     Priority: Low    Pulmonary emphysema ([facility] [address] 11/17/2016     Priority: Low    H/O endarterectomy 11/10/2015     Priority: Low    Complicated wound infection 11/10/2015     Priority: Low    Allergic rhinitis 11/10/2015     Priority: Low    Hiatal hernia 11/10/2015     Priority: Low    Diabetic neuropathy ([address] 11/10/2015     Priority: Low    Osteoarthritis 11/10/2015     Priority: Low    Encounter for long-term (current) drug use 11/10/2015     Priority: Low    Benign neoplasm of colon 11/10/2015 Priority: Low    PAD (peripheral artery disease) ([facility] 11/10/2015     Priority: Low    Asthma 11/10/2015     Priority: Low    Orthostatic hypotension 11/10/2015     Priority: Low    Hyperlipidemia 11/10/2015     Priority: Low    S/P angioplasty with stent 11/10/2015     Priority: Low    COPD (chronic obstructive pulmonary disease) ([facility] 04/10/2015     Priority: Low    Arterial degeneration 11/05/2014     Priority: Low     11/6/14 (Dr Alex Tovar) 1. Bleeding from right groin wound. 2. Disruption of right common femoral artery patch anastomosis and   possible arterial infection. Normocytic anemia 10/23/2014     Priority: Low    MINI (acute kidney injury) ([facility] 09/15/2014     Priority: Low    DM (diabetes mellitus) type II, controlled, with peripheral vascular disorder ([facility] 09/11/2014     Priority: Low    Atherosclerosis of native arteries of extremity with intermittent claudication ([facility] 08/15/2014     Priority: Low     9/11/14 (Dr Alex Tovar) Bilateral common femoral endarterectomies. Personal history of tobacco use, presenting hazards to health 02/12/2013     Priority: Low    Asbestos exposure 02/12/2013     Priority: Low    HTN (hypertension) 10/12/2011     Priority: Low    Severe obesity (BMI 35.0-39. 9) with comorbidity ([facility] 10/12/2011     Priority: Low    Sleep apnea 10/12/2011     Priority: Low    Ischemic cardiomyopathy 10/12/2011     Priority: Low    Coronary artery disease involving native coronary artery of native heart without angina pectoris 10/12/2011     Priority: Low           Plan:     Number and Complexity of Problems addressed and   Risks of complications and/or morbidity of management    Treatment Note please see attached Discharge Instructions  Any procedures done today in the wound center (if documented in a separate note) in [facility] are made part of this record by reference  Written patient dismissal instructions given to patient or POA.           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 He has had an ulcer on the left heel for several months. He has been debilitated following back surgery but is also debilitated from multiple medical problems including cardiac disease, peripheral vascular disease, and diabetes. He is anticoagulated on Eliquis and Plavix. He has had femoral endarterectomies in the past and is now followed by Dr. Heather Sepulveda. JOVANA in July was abnormal and has not been reevaluated since deterioration of his tissue. He is scheduled for repeat JOVANA in January and we will consult vascular surgery for earlier evaluation. Vascular consultation to follow as needed. He has not been offloading despite having work boots. There is clearly a pressure component. There is no mirror lesion on the right heel and may reflect his discrepancy in blood supply. He does describe some episodes that could be rest pain though it does not drop his legs for improvement. This may also be limited by his debility. We will not do debridement today. We will dress with Betadine and an absorbent pad and follow-up in 1 week. Offloading was emphasized.                   Wound Care  Orders Placed This Encounter   Procedures    AMBULATORY REFERRAL TO PERIPHERAL VASCULAR RESISTANCE     Referral Priority:   Routine     Referral Type:   Consult for Advice and Opinion     Number of Visits Requested:   1       Return Appointment:   1 week with Familia Donovan MD        Instructions: Left Lateral Heel:  Cleanse with Normal Saline  Paint with Betadine Daily  Cover with Bandaid/Bordered Foam     Float Heel When Patient in [facility]  When in [facility] Boot and When Heel is Unable to be [employer]     Vascular Appointment for [employer] and See Vascular-10:30 on October 25, 2022     [facility] and Assessment          Level of MDM (2/3 elements below)  Number and Complexity of Problems Addressed Amount and/or Complexity of Data to be Reviewed and Analyzed  *Each unique test, order, or document contributes to the combination of 2 or combination of 3 in Category 1 below. Risk of Complications and/or Morbidity or Mortality of pt Management     25295  77533 SF Minimal  ?1self-limited or minor problem Minimal or none Minimal risk of morbidity from additional diagnostic testing or Rx   87594  03913 Low Low  ? 2or more self-limited or minor problems;    or  ? 1stable chronic illness;    or  ?1acute, uncomplicated illness or injury   Limited  (Must meet the requirements of at least 1 of the 2 categories)  Category 1: Tests and documents   ? Any combination of 2 from the following:  ?Review of prior external note(s) from each unique source*;  ?review of the result(s) of each unique test*;   ?ordering of each unique test*    or   Category 2: Assessment requiring an independent historian(s)  (For the categories of independent interpretation of tests and discussion of management or test interpretation, see moderate or high) Low risk of morbidity from additional diagnostic testing or treatment     75244  11486 Mod Moderate  ? 1or more chronic illnesses with exacerbation, progression, or side effects of treatment;    or  ?2or more stable chronic illnesses;    or  ?1undiagnosed new problem with uncertain prognosis;    or  ?1acute illness with systemic symptoms;    or  ?1acute complicated injury   Moderate  (Must meet the requirements of at least 1 out of 3 categories)  Category 1: Tests, documents, or independent historian(s)  ? Any combination of 3 from the following:   ?Review of prior external note(s) from each unique source*;  ?Review of the result(s) of each unique test*;  ?Ordering of each unique test*;  ?Assessment requiring an independent historian(s)    or  Category 2: Independent interpretation of tests   ? Independent interpretation of a test performed by another physician/other qualified health care professional (not separately reported);     or  Category 3: Discussion of management or test interpretation  ? Discussion of management or test interpretation with external physician/other qualified health care professional/appropriate source (not separately reported)   Moderate risk of morbidity from additional diagnostic testing or treatment  Examples only:  ?Prescription drug management - advanced wound care prescription Rx wound care products  (eg Iodosorb, hydrofera, silvadene, collagen, puracol plus, optiloc, biologics - placenta, Theraskin, Apligraf). Note also that if home health care is involved, they will not apply topical therapies without a prescription/order from physician      ? Decision regarding minor surgery with identified patient or procedure risk factors  ? Decision regarding elective major surgery without identified patient or procedure risk factors   ? Diagnosis or treatment significantly limited by social determinants of health       37083  13664 High High  ? 1or more chronic illnesses with severe exacerbation, progression, or side effects of treatment;    or  ?1 acute or chronic illness or injury that poses a threat to life or bodily function   Extensive  (Must meet the requirements of at least 2 out of 3 categories)  Category 1: Tests, documents, or independent historian(s)  ? Any combination of 3 from the following:   ?Review of prior external note(s) from each unique source*;  ?Review of the result(s) of each unique test*;   ?Ordering of each unique test*;   ?Assessment requiring an independent historian(s)    or   Category 2: Independent interpretation of tests   ? Independent interpretation of a test performed by another physician/other qualified health care professional (not separately reported);     or  Category 3: Discussion of management or test interpretation  ? Discussion of management or test interpretation with external physician/other qualified health care professional/appropriate source (not separately reported)   High risk of morbidity from additional diagnostic testing or treatment  Examples only:  ?Drug therapy requiring intensive monitoring for toxicity  ? Decision regarding elective major surgery with identified patient or procedure risk factors  ? Decision regarding emergency major surgery  ? Decision regarding hospitalization  ? Decision not to resuscitate or to de-escalate care because of poor prognosis                 I have personally performed a face-to-face diagnostic evaluation and management  service on this patient. I have independently seen the patient. I have independently obtained the above history from the patient/family. I have independently examined the patient with above findings. I have independently reviewed data/labs for this patient and developed the above plan of care (MDM).       Electronically signed by Ramona Blakely MD on 10/24/2022 at 10:04 AM

## 2022-10-25 ENCOUNTER — TELEPHONE (OUTPATIENT)
Dept: WOUND CARE | Age: 77
End: 2022-10-25

## 2022-10-25 ENCOUNTER — TELEPHONE (OUTPATIENT)
Dept: CARDIOLOGY CLINIC | Age: 77
End: 2022-10-25

## 2022-10-25 ENCOUNTER — PREP FOR PROCEDURE (OUTPATIENT)
Dept: VASCULAR SURGERY | Age: 77
End: 2022-10-25

## 2022-10-25 ENCOUNTER — OFFICE VISIT (OUTPATIENT)
Dept: VASCULAR SURGERY | Age: 77
End: 2022-10-25
Payer: MEDICARE

## 2022-10-25 VITALS
HEIGHT: 73 IN | TEMPERATURE: 97.7 F | HEART RATE: 67 BPM | OXYGEN SATURATION: 98 % | DIASTOLIC BLOOD PRESSURE: 61 MMHG | WEIGHT: 242 LBS | SYSTOLIC BLOOD PRESSURE: 100 MMHG | BODY MASS INDEX: 32.07 KG/M2

## 2022-10-25 DIAGNOSIS — I70.262 CRITICAL LIMB ISCHEMIA OF LEFT LOWER EXTREMITY WITH GANGRENE (HCC): Primary | ICD-10-CM

## 2022-10-25 DIAGNOSIS — E11.51 DM (DIABETES MELLITUS) TYPE II, CONTROLLED, WITH PERIPHERAL VASCULAR DISORDER (HCC): ICD-10-CM

## 2022-10-25 DIAGNOSIS — E11.42 DIABETIC POLYNEUROPATHY ASSOCIATED WITH TYPE 2 DIABETES MELLITUS (HCC): ICD-10-CM

## 2022-10-25 DIAGNOSIS — Z79.02 ANTIPLATELET OR ANTITHROMBOTIC LONG-TERM USE: ICD-10-CM

## 2022-10-25 DIAGNOSIS — Z98.890 H/O ENDARTERECTOMY: ICD-10-CM

## 2022-10-25 DIAGNOSIS — I73.9 PAD (PERIPHERAL ARTERY DISEASE) (HCC): ICD-10-CM

## 2022-10-25 DIAGNOSIS — L97.422 NON-PRESSURE CHRONIC ULCER OF LEFT HEEL AND MIDFOOT WITH FAT LAYER EXPOSED (HCC): Primary | ICD-10-CM

## 2022-10-25 DIAGNOSIS — I70.90: ICD-10-CM

## 2022-10-25 DIAGNOSIS — I48.0 PAROXYSMAL ATRIAL FIBRILLATION (HCC): ICD-10-CM

## 2022-10-25 DIAGNOSIS — R53.81 PHYSICAL DEBILITY: ICD-10-CM

## 2022-10-25 PROCEDURE — G8417 CALC BMI ABV UP PARAM F/U: HCPCS | Performed by: STUDENT IN AN ORGANIZED HEALTH CARE EDUCATION/TRAINING PROGRAM

## 2022-10-25 PROCEDURE — 3044F HG A1C LEVEL LT 7.0%: CPT | Performed by: STUDENT IN AN ORGANIZED HEALTH CARE EDUCATION/TRAINING PROGRAM

## 2022-10-25 PROCEDURE — 4004F PT TOBACCO SCREEN RCVD TLK: CPT | Performed by: STUDENT IN AN ORGANIZED HEALTH CARE EDUCATION/TRAINING PROGRAM

## 2022-10-25 PROCEDURE — 1123F ACP DISCUSS/DSCN MKR DOCD: CPT | Performed by: STUDENT IN AN ORGANIZED HEALTH CARE EDUCATION/TRAINING PROGRAM

## 2022-10-25 PROCEDURE — G8427 DOCREV CUR MEDS BY ELIG CLIN: HCPCS | Performed by: STUDENT IN AN ORGANIZED HEALTH CARE EDUCATION/TRAINING PROGRAM

## 2022-10-25 PROCEDURE — G8484 FLU IMMUNIZE NO ADMIN: HCPCS | Performed by: STUDENT IN AN ORGANIZED HEALTH CARE EDUCATION/TRAINING PROGRAM

## 2022-10-25 PROCEDURE — 99205 OFFICE O/P NEW HI 60 MIN: CPT | Performed by: STUDENT IN AN ORGANIZED HEALTH CARE EDUCATION/TRAINING PROGRAM

## 2022-10-25 NOTE — TELEPHONE ENCOUNTER
Cardiac Clearance        Physician or Practice Requesting:[facility]  : Dr Hector Lazcano Phone Number: [phone]  Fax Number: [phone]  Date of Surgery/Procedure: 10/28/22  Type of Surgery or Procedure: Arteriogram  Type of Anesthesia: General  Type of Clearance Requested: cardiac and med  Medication to Hold:Eliquis  Days to Hold:2 days before surgery

## 2022-10-25 NOTE — TELEPHONE ENCOUNTER
Received call from wife today concerned about culture results and need for antibiotics. Dr. Shagufta Beal reviewed culture, no new orders for antibiotics as wound not clinically infected. This RN informed patient's wife of plan of care; she verbalized understanding.

## 2022-10-25 NOTE — PROGRESS NOTES
[address]   [address] FAX: [phone]    Byron Yin  : 1945      Reason for visit: Left Heal ulcer    Chief Complaint: 65 y.o. male with recent history psoas abscesses with lumbar osteomyelitis, patient with recent MRI on 10/18 showing persistent abscesses at the L2-L4 disc spaces. Patient now primarily wheelchair-bound and able to ambulate short distances with walker. Patient has now developed pressure ulcer left heel for the last month. Patient with remote history of bilateral common femoral endarterectomies complicated by right femoral infection and sartorius flap. Noninvasive imaging shows bilateral SFA occlusions with JOAVNA 0.62 on the left, and toe pressures of 22mmHg. Creatinine 1.2. We will plan for left lower extremity angiogram via left brachial approach given history of right common femoral endarterectomy with complication. Discussion held with patient regarding failure of endovascular treatment and requirement of lower extremity bypass. Plan: Left lower extremity angiogram    Imaging reviewed: BLE Arterial Duplex      Right side findings: Resting JOVANA is 0.89. This is decreased. Left side findings: Resting JOVANA is 0.62. This is decreased. Right lower extremity: The JOVANA (ankle-brachial index) is 0.89, and is abnormal.The superficial femoral artery appears to be occluded by heterogeneous plaque in the mid and distal superficial femoral artery. Monophasic flow reconstitutes in the popliteal.  The great toe pressure is 45mmHg. Left lower extremity: The JOVANA (ankle-brachial index) is 0.62, and is abnormal.The superficial femoral artery appears to be occluded by heterogeneous plaque in the mid and distal superficial femoral artery. Monophasic flow reconstitutes in the popliteal.  The great toe pressure is 22mmHg. The abdominal aorta was evaluated and is aneurysmal, measuring 3cm x 2.6cm at its maximum diameter. Smoker: Former and Quit-2011    Complexity of illness:  DM, HTN, Afib, CAD, PAD, and COPD    Procedures by [facility]:Bilateral femoral endarterectomies, Celsa Tianna 9/14, Right CFA repair with Sartorious flap 11/14    Referred by: Eliana Simental MD     Statin: Yes     DAPT/AC: Plavix and Eliquis    Dye allergy: no    Metal implants or AICD: Yes    Ambulatory status: Wheelchair bound    Constitutional:   Negative for fevers and unexplained weight loss. Eyes:   Negative for visual disturbance. ENT:   Negative for significant hearing loss and tinnitus. Respiratory:   Negative for hemoptysis. Cardiovascular:   Negative except as noted in HPI. Gastrointestinal:   Negative for melena and abdominal pain. Genitourinary:   Negative for hematuria, renal stones. Integumentary:   Negative for rash or non-healing wounds  Hematologic/Lymphatic:   Negative for excessive bleeding hx or clotting disorder. Musculoskeletal:  Negative for active, unexplained/severe joint pain. Neurological:   Negative for stroke. Behavioral/Psych:   Negative for suicidal ideations. Endocrine:   Negative for uncontrolled diabetic symptoms including polyuria, polydipsia and poor wound healing. Physical Examination:   Height: 6' 1\" (1.854 m), Weight: 242 lb (109.8 kg), BP: 100/61    General:Well-developed. No distress. HENT:   CV: Regular rhythm. LUNG: Effort normal and breath sounds normal. No respiratory distress. Abdominal: Soft. Bowel sounds are normal. he exhibits no distension. There is no tenderness. There is no guarding. No hernia. Extremities:Left 2.5cm x 2.5cm heal ulcer with clean base, no erythema  Vascular: Radial:, L, 2+ , R, 2+  , Femoral:, L, 2+ , R, 2+  , DP:, L absent R 1+    Cem Damon MD    Elements of this note have been dictated using speech recognition software. As a result, errors of speech recognition may have occurred.

## 2022-10-26 RX ORDER — TAMSULOSIN HYDROCHLORIDE 0.4 MG/1
0.4 CAPSULE ORAL DAILY
COMMUNITY

## 2022-10-27 ENCOUNTER — OFFICE VISIT (OUTPATIENT)
Dept: PULMONOLOGY | Age: 77
End: 2022-10-27
Payer: MEDICARE

## 2022-10-27 ENCOUNTER — ANESTHESIA EVENT (OUTPATIENT)
Dept: SURGERY | Age: 77
End: 2022-10-27
Payer: MEDICARE

## 2022-10-27 VITALS
TEMPERATURE: 97.1 F | DIASTOLIC BLOOD PRESSURE: 62 MMHG | BODY MASS INDEX: 28.23 KG/M2 | SYSTOLIC BLOOD PRESSURE: 104 MMHG | HEIGHT: 73 IN | WEIGHT: 213 LBS | OXYGEN SATURATION: 95 % | HEART RATE: 67 BPM

## 2022-10-27 DIAGNOSIS — F17.211 CIGARETTE NICOTINE DEPENDENCE IN REMISSION: ICD-10-CM

## 2022-10-27 DIAGNOSIS — J44.9 COPD, SEVERE (HCC): Primary | ICD-10-CM

## 2022-10-27 DIAGNOSIS — G47.33 OBSTRUCTIVE SLEEP APNEA (ADULT) (PEDIATRIC): ICD-10-CM

## 2022-10-27 DIAGNOSIS — Z77.090 CONTACT WITH AND (SUSPECTED) EXPOSURE TO ASBESTOS: ICD-10-CM

## 2022-10-27 PROCEDURE — 3074F SYST BP LT 130 MM HG: CPT | Performed by: NURSE PRACTITIONER

## 2022-10-27 PROCEDURE — 99214 OFFICE O/P EST MOD 30 MIN: CPT | Performed by: NURSE PRACTITIONER

## 2022-10-27 PROCEDURE — G8427 DOCREV CUR MEDS BY ELIG CLIN: HCPCS | Performed by: NURSE PRACTITIONER

## 2022-10-27 PROCEDURE — 4004F PT TOBACCO SCREEN RCVD TLK: CPT | Performed by: NURSE PRACTITIONER

## 2022-10-27 PROCEDURE — G8417 CALC BMI ABV UP PARAM F/U: HCPCS | Performed by: NURSE PRACTITIONER

## 2022-10-27 PROCEDURE — 3023F SPIROM DOC REV: CPT | Performed by: NURSE PRACTITIONER

## 2022-10-27 PROCEDURE — 1123F ACP DISCUSS/DSCN MKR DOCD: CPT | Performed by: NURSE PRACTITIONER

## 2022-10-27 PROCEDURE — 3078F DIAST BP <80 MM HG: CPT | Performed by: NURSE PRACTITIONER

## 2022-10-27 PROCEDURE — G8484 FLU IMMUNIZE NO ADMIN: HCPCS | Performed by: NURSE PRACTITIONER

## 2022-10-27 RX ORDER — ALBUTEROL SULFATE 90 UG/1
AEROSOL, METERED RESPIRATORY (INHALATION)
Qty: 18 G | Refills: 11 | Status: SHIPPED | OUTPATIENT
Start: 2022-10-27

## 2022-10-27 ASSESSMENT — ENCOUNTER SYMPTOMS
WHEEZING: 0
CHEST TIGHTNESS: 0
SHORTNESS OF BREATH: 0
COUGH: 1

## 2022-10-27 NOTE — PATIENT INSTRUCTIONS
Resume Advair twice daily, rinse mouth after use. May use albuterol 4 times daily to aid with shortness of breath, wheezing, cough/chest congestion and mucus clearance. OTC mucolytic, (mucinex or generic equivalent of guaifenesin), 2400mg in 24 hours in divided doses as needed to thin mucous. Discussed that he may need to take maximum daily dose for period of time.       Continue CPAP as prescribed

## 2022-10-27 NOTE — PROGRESS NOTES
[address] Brenda Lozano. [address], [address] [facility]  [phone]    Patient Name:  Karon Su    YOB: 1945    Office Visit 10/27/2022      CHIEF COMPLAINT:      Chief Complaint   Patient presents with    COPD    Other     HYPOXIA    Follow-up         ASSESSMENT:   (Medical Decision Making)                                                                                                                                          Encounter Diagnoses   Name Primary? COPD, severe ([address] Yes    Obstructive sleep apnea (adult) (pediatric)     Contact with and (suspected) exposure to asbestos     Cigarette nicotine dependence in remission      Since his last visit, he has been dealing with significant nonpulmonary issues, including kyphoplasty, falls, questionable abscess, infectious disease consultation, now nonhealing wound of left foot culminating in plans for Left lower extremity angiogram tomorrow. Has not been taking Advair, albuterol or Mucinex routinely. Reports cough productive of nonpurulent sputum. He is compliant with CPAP. Remains tobacco free. Nebulizer treatment with albuterol 2.5 mg was administered during the visit. He was able to expectorate tan sputum. Lung sounds improved significantly. PLAN:     Resume Advair twice daily, rinse mouth after use. May use albuterol 4 times daily to aid with shortness of breath, wheezing, cough/chest congestion and mucus clearance. Advised to take inhalers with him as well as CPAP to the hospital tomorrow in the event he has overnight stay. OTC mucolytic, (mucinex or generic equivalent of guaifenesin), 2400mg in 24 hours in divided doses as needed to thin mucous. Discussed that he may need to take maximum daily dose for period of time.       Continue CPAP as prescribed    Follow-up and Dispositions    Return in about 4 months (around 2/27/2023) for MD or Devota Gowers, COPD, SHERI.           Orders Placed This Encounter    albuterol sulfate HFA (PROVENTIL;VENTOLIN;PROAIR) 108 (90 Base) MCG/ACT inhaler     Sig: USE 2 PUFFS BY INHALATION 4 TIMES DAILY AS NEEDED FOR WHEEZING OR SHORTNESS OF BREATH. Patient prefers ventolin. Dispense:  18 g     Refill:  [id number] [address] - It's what's in store [facility], APRN - CNP    Total  time spent with patient - 54 min. Collaborating MD: Dr. Veronique Harrell:    He is a very pleasant 65year-old male with history of severe COPD, bibasilar atelectasis/infiltrates, obstructive sleep apnea, asbestos exposure,  history of tobacco use and allergic rhinitis seen today for routine follow up. Since his last visit, he has been dealing with significant nonpulmonary issues. His wife assists with history. He presents in wheelchair. Had kyphoplasty, falls, questionable abscess, infectious disease consultation, now nonhealing wound of left foot culminating in plans for Left lower extremity angiogram tomorrow. Has not been taking Advair, albuterol or Mucinex routinely. Had significant rhonchi on exam today. Reports cough productive of nonpurulent sputum. He is compliant with CPAP. Remains tobacco free. DIAGNOSTICS:        Spirometry 5/2013: FEV1 1.70 Final FEV1 % Pred 46 % Final FVC 2.55 Final FVC % Pred 52 % Final FEV1/F; Significant  interval decline in FVC and FEV (previously 67% and 58% of predicted, or 3.25 and 2.16 L respectively). Chest CT 10/2014 - Negative for pulmonary embolus; basilar atelectasis. Stable left adrenal mass. Spirometry:  10/12/2015 - Moderate restrictive defect, significant interval improvement in FVC  and FEV1. Spirometry 4/11/0216 - moderate restrictive and obstructive defects, no significant interval change. CXR 10/2016-linear density in both lung bases. Could reflect some atelectasis and/or scarring  Spirometry 11/17/2016 - moderate restrictive defect.   Spirometry 2/14/2018-moderate obstructive and restrictive defects, no significant interval change. CXR 2/8/2019-suboptimal exposure Technique. Atelectasis or consolidation left lower lobe. Spirometry 2/20/2019 interval decline in FVC, no significant change in FEV1  CXR 4/9/2019-clearing of left lower lobe infiltrate, no acute process. Spirometry 10/7/2019-moderately severe restrictive defect, no significant change. Ambulatory oximetry 3/1/2021-baseline saturation 96% room air at rest, 86 room air with ambulation. 92% on 2 lpm w/ exertion. CXR 5/11/2021-overall improvement in bilateral airspace opacities, mild bibasilar atelectasis/infiltrates present. CXR 9/1/2021-clear lungs, no acute cardiopulmonary process. Spirometry 3/1/2022-severe obstructive defect with decreased FVC, interval improvement in FVC. Chest CT 3/9/2022-multilobar atypical infectious or inflammatory bronchiolitis primarily affecting the small airways involving the bilateral upper lobes and left lower lobe. PCXR 7/27/2022-pulmonary infiltrate in RUL and mid to lower left lung Has mostly cleared. Atelectasis or scarring right lung base appears stable. Stable cardiomegaly. ECHO 7/30/2022-EF 40-45%. Normal diastolic function. RVSP estimated 27 mmHg. Mild to moderate TR, mild MR. CXR 8/12/2022-improved bibasilar opacities with mild residual remaining on the right. CXR 8/30/2022-early pulmonary edema favored. CXR 9/1/2022-stable cardiomegaly. No airspace consolidation. Mild prominence of interstitial markings which is unchanged. _____________________________________________________________      REVIEW OF SYSTEMS:    Review of Systems   Constitutional:  Positive for fatigue. Negative for chills and fever. Has lost substantial weight over the past several months, appetite has decreased despite nutritional supplements. Respiratory:  Positive for cough. Negative for chest tightness, shortness of breath and wheezing. Cardiovascular:  Negative for chest pain.    Skin: Positive for wound. Neurological:  Negative for dizziness, tremors, seizures, weakness and headaches. PHYSICAL EXAM:    Vitals:    10/27/22 1449   BP: 104/62   Pulse: 67   Temp: 97.1 °F (36.2 °C)   TempSrc: Skin   SpO2: 95%   Weight: 213 lb (96.6 kg)   Height: 6' 1\" (1.854 m)        Body mass index is 28.1 kg/m². GENERAL APPEARANCE:  The patient is normal weight and in no respiratory distress. Examined in wheelchair. HEENT:  PERRL. Conjunctivae unremarkable. NECK/LYMPHATIC:   Symmetrical with no elevation of jugular venous pulsation. Trachea midline. No thyroid enlargement. No cervical adenopathy. LUNGS:   Normal respiratory effort with symmetrical lung expansion. Breath sounds-decreased, scattered rhonchi, loudest right lung, very faint end expiratory wheeze, no crackles. HEART:   There is a normal rate and regular rhythm. No murmur, rub, or gallop. There is trace edema in the lower extremities. NEURO:  The patient is alert and oriented to person, place, and time. Memory appears intact and mood is normal.  No gross sensorimotor deficits are present. DIAGNOSTIC TESTS: Studies were personally reviewed by me and discussed with the patient. CXR:      XR CHEST (2 VW) 09/02/2022    Narrative  PA AND LATERAL CHEST X-RAY. Clinical Indication: Increasing oxygen requirement    Comparison: Chest x-ray dated 8/30/2022    Findings: 2 views of the chest submitted demonstrate stable cardiomegaly with an  AICD in place. No airspace consolidation evident. There is mild prominence of  interstitial markings which is unchanged. There is no pleural effusion. There is  no pneumothorax. Impression  No significant interval change.         CT WITH CONTRAST:    CT CHEST W CONTRAST 03/09/2022    Narrative  CT of the chest with contrast.    CLINICAL INDICATION: Pneumonia, prior Covid infection    PROCEDURE: Serial thin section axial images obtained from the thoracic inlet  through the upper abdomen following the administration of 80 cc of Optiray-350  intravenous contrast.  Radiation dose reduction techniques were used for this  study. Our CT scanners use one or all of the following: Automated exposure  control, adjusted of the mA and/or kV according to patient size, iterative  reconstruction    COMPARISON:Chest CT dated 10/23/2014    FINDINGS:    No mediastinal or axillary adenopathy. There is no pleural or pericardial  effusion. There is no pneumothorax. Central patchy groundglass opacities noted  in the right upper lobe with a more small airways type groundglass nodular  appearance in the left lower lobe and lingula. Similar findings are noted to a  lesser extent in the left lower lobe. No dense airspace consolidation evident. There is no pleural effusion. Limited evaluation of the upper abdomen is unremarkable. Impression  1. Multilobar atypical infectious or inflammatory bronchiolitis primarily  affecting the small airways involving the bilateral upper lobes and left lower  lobe.         Past Medical History:   Diagnosis Date    Acute respiratory failure with hypoxia ([address] 10/23/2014    MINI (acute kidney injury) ([address] 09/15/2014    MINI (acute kidney injury) ([address]     MINI (acute kidney injury) ([address]     Allergic rhinitis 11/10/2015    Asthma 11/10/2015    Benign essential hypertension 11/10/2015    Benign neoplasm of colon 11/10/2015    CAD (coronary artery disease) 11/10/2015    CAD (coronary artery disease) 1998, 1999    mix2, 3 stents [id number]    CAP (community acquired pneumonia) 12/31/2020    Cardiomyopathy ([address] 62/42/6147    Complicated wound infection 11/10/2015    COPD (chronic obstructive pulmonary disease) with emphysema ([address] 11/10/2015    COPD exacerbation ([address] 10/12/2011    COVID-19 12/31/2020    Diabetes mellitus type 2, controlled ([address] 11/10/2015    doesn/t check daily oral meds, A1c 6.0 (8/10/22)    Diabetic neuropathy ([address] 11/10/2015    Disruption of wound, unspecified 10/09/2014 Encounter for long-term (current) use of other high-risk medications 11/10/2015    Extremity atherosclerosis with intermittent claudication ([address] 11/10/2015    H/O endarterectomy 11/10/2015    Hiatal hernia 11/10/2015    History of 2019 novel coronavirus disease (COVID-19)     12/31/2020- hospitalized    Hyperglycemia due to type 1 diabetes mellitus ([address] 11/10/2015    Hyperlipidemia 11/10/2015    ICD (implantable cardioverter-defibrillator) in place 06/16/2022    Monomorphic ventricular tachycardia 12/31/2020    Myocardial infarction [facility]) 1999    Myocardial infarction ([address] 11/10/2015    Obesity 11/10/2015    Orthostatic hypotension 11/10/2015    Osteoarthritis 11/10/2015    Peripheral vascular disease ([address] 11/10/2015    PNA (pneumonia) 02/08/2019    PVC (premature ventricular contraction)     Rash 21/27/4126    Seroma complicating a procedure 10/02/2014    Sleep apnea     cpap    Toe laceration     Type 2 diabetes with nephropathy ([address]     Uncontrolled type 2 diabetes mellitus 11/10/2015    Vertiginous syndromes and other disorders of vestibular system 11/10/2015       Patient Active Problem List   Diagnosis    H/O endarterectomy    Arterial degeneration    Atrial fibrillation (HCC)    Complicated wound infection    Allergic rhinitis    Elevated troponin    HTN (hypertension)    Pulmonary emphysema (HCC)    Atherosclerosis of native arteries of extremity with intermittent claudication (HCC)    Severe obesity (BMI 35.0-39. 9) with comorbidity ([address]    COPD (chronic obstructive pulmonary disease) (HCC)    Personal history of tobacco use, presenting hazards to health    Intermittent spinal claudication (HCC)    Cigarette nicotine dependence in remission    Irregular heart beat    Acute metabolic encephalopathy    Chest pain    Hiatal hernia    Diabetic neuropathy (HCC)    Normocytic anemia    Chronic pain of right knee    Sleep apnea    Osteoarthritis    Encounter for long-term (current) drug use    Ischemic cardiomyopathy    Ventricular tachycardia    VT (ventricular tachycardia)    Coronary artery disease involving native coronary artery of native heart without angina pectoris    Benign neoplasm of colon    PAD (peripheral artery disease) (HCC)    Asthma    Orthostatic hypotension    Hyperlipidemia    S/P angioplasty with stent    DM (diabetes mellitus) type II, controlled, with peripheral vascular disorder (HCC)    Toe laceration    Obstructive sleep apnea    MINI (acute kidney injury) ([facility]    Type 2 diabetes with nephropathy (HCC)    Hypoxia    Abnormal nuclear cardiac imaging test    PVC's (premature ventricular contractions)    Asbestos exposure    Sepsis ([address]    ICD (implantable cardioverter-defibrillator) in place    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, vertebra(e), init ([address]    General weakness    Acute cystitis without hematuria    Low back pain without sciatica    Back pain    DNI (do not intubate)    Elevated liver enzymes    Anemia    Hyponatremia    Physical debility    Lumbar discitis    Psoas muscle abscess (HCC)    Non-pressure chronic ulcer of left heel and midfoot with fat layer exposed ([facility]    Antiplatelet or antithrombotic long-term use       Past Surgical History:   Procedure Laterality Date    CARDIAC CATHETERIZATION  12/05/2018    LV EF=40%. LM:nl. LAD:30-40% mid stenosis. LCX OM1:95% stenosis. RCA:100% occlusion at RV branch.     CATARACT REMOVAL Right 07/20/2022    CORONARY ANGIOPLASTY WITH STENT PLACEMENT  12/05/2018    LCX OM1:2.5 x 12 [place] RAKESH    CORONARY ANGIOPLASTY WITH STENT PLACEMENT  1999    VT ABDOMEN SURGERY PROC UNLISTED  1960    abdominal cyst removed    VT CARDIAC SURG PROCEDURE UNLIST  1999    stents x3    SPINE SURGERY N/A 07/19/2022    LUMBAR 2, LUMBAR 4 KYPHOPLASTY AND ANY OTHER INDICATED LEVELS performed by Siria Mojica MD at [address] [place] [address]  11/5/14    Repair of right common femoral artery     VASCULAR SURGERY  9/11/14    tiffanie femoral endarterectomy Social History     Socioeconomic History    Marital status:      Spouse name: None    Number of children: None    Years of education: None    Highest education level: None   Tobacco Use    Smoking status: Former     Packs/day: 1.00     Years: 50.00     Pack years: 50.00     Types: Cigarettes     Quit date: 10/2/2011     Years since quittin.0    Smokeless tobacco: Current     Types: Chew   Vaping Use    Vaping Use: Never used   Substance and Sexual Activity    Alcohol use: Yes     Alcohol/week: 0.0 standard drinks    Drug use: No   Social History Narrative    Lives with wife       Family History   Problem Relation Age of Onset    Breast Cancer Mother     Hypertension Mother     Other Father         DIVERTICULITIS    Crohn's Disease Sister     Lung Disease Brother         mesothioloma    Diabetes Sister     Heart Attack Father     Heart Disease Father     Stroke Mother        Allergies   Allergen Reactions    Acetaminophen Hives     Per spouse has small amount of hives, takes 1/2 and tolerates     Azithromycin Hives    Oxaprozin Other (See Comments)     ELEVATED BLOOD PRESSURE    Oxycodone Anxiety       Current Outpatient Medications   Medication Sig    CPAP Machine MISC by Does not apply route    GUAIFENESIN 1200 PO Take 1 tablet by mouth every 12 hours as needed    tamsulosin (FLOMAX) 0.4 MG capsule Take 0.4 mg by mouth daily    traMADol (ULTRAM) 50 MG tablet Take 50 mg by mouth every 6 hours as needed for Pain.  Taking 1/2 every morning    acetaminophen (TYLENOL) 500 MG tablet Take 500 mg by mouth every 6 hours as needed for Pain Taking 1/2 two times daily    doxycycline hyclate (VIBRA-TABS) 100 MG tablet Take 1 tablet by mouth 2 times daily for 10 days    valsartan (DIOVAN) 80 MG tablet Take 0.5 tablets by mouth daily (Patient taking differently: Take 80 mg by mouth daily)    glipiZIDE (GLUCOTROL XL) 10 MG extended release tablet Take 1 tablet by mouth daily    furosemide (LASIX) 20 MG tablet Take 1 tablet by mouth in the morning. (Patient taking differently: Take 20 mg by mouth daily 1/2 tablet)    rosuvastatin (CRESTOR) 10 MG tablet TAKE 1 TABLET BY MOUTH EVERY DAY (Patient taking differently: at bedtime)    fluticasone-salmeterol (ADVAIR) 250-50 MCG/ACT AEPB diskus inhaler Inhale 1 puff into the lungs in the morning and at bedtime    albuterol sulfate  (90 Base) MCG/ACT inhaler USE 2 PUFFS BY INHALATION 4 TIMES DAILY AS NEEDED FOR WHEEZING OR SHORTNESS OF BREATH.    amiodarone (CORDARONE) 200 MG tablet Take 200 mg by mouth daily    apixaban (ELIQUIS) 5 MG TABS tablet Take 5 mg by mouth every 12 hours    ascorbic acid (VITAMIN C) 500 MG tablet Take 500 mg by mouth    carvedilol (COREG) 25 MG tablet Take 25 mg by mouth 2 times daily (with meals)    Cholecalciferol 50 MCG (2000 UT) TABS Take 2,000 Units by mouth    clopidogrel (PLAVIX) 75 MG tablet Take 75 mg by mouth daily    fluticasone (FLONASE) 50 MCG/ACT nasal spray USE 1 OR 2 SPRAYS IN EACH NOSTRIL EVERY DAY. latanoprost (XALATAN) 0.005 % ophthalmic solution Apply 1 drop to eye nightly    magnesium oxide (MAG-OX) 400 (240 Mg) MG tablet TAKE 1 TABLET BY MOUTH EVERY DAY    montelukast (SINGULAIR) 10 MG tablet TAKE 1 TABLET BY MOUTH EVERY DAY AT BEDTIME    nitroGLYCERIN (NITROSTAT) 0.4 MG SL tablet Place 0.4 mg under the tongue    polyethylene glycol (GLYCOLAX) 17 GM/SCOOP powder Take 17 g by mouth daily    pantoprazole (PROTONIX) 40 MG tablet Take 1 tablet by mouth in the morning and 1 tablet in the evening. Take before meals. (Patient not taking: No sig reported)    tiZANidine (ZANAFLEX) 2 MG tablet Take 1 tablet by mouth every 8 hours as needed (muscle spasms) (Patient not taking: No sig reported)     No current facility-administered medications for this visit.        Over 50% of today's office visit was spent in face to face time reviewing test results, prognosis, importance of compliance, education about disease process, benefits of medications, instructions for management of acute symptoms, and follow up plans. Electronically signed. Dictated using voice recognition software.   Proof read but unrecognized errors may exist.

## 2022-10-27 NOTE — PROGRESS NOTES
He is a very pleasant 65year-old male with history of COPD, bibasilar atelectasis/infiltrates, obstructive sleep apnea, asbestos exposure,  history of tobacco use and allergic rhinitis seen today for routine follow up. DIAGNOSTICS:        Spirometry 5/2013: FEV1 1.70 Final FEV1 % Pred 46 % Final FVC 2.55 Final FVC % Pred 52 % Final FEV1/F; Significant  interval decline in FVC and FEV (previously 67% and 58% of predicted, or 3.25 and 2.16 L respectively). Chest CT 10/2014 - Negative for pulmonary embolus; basilar atelectasis. Stable left adrenal mass. Spirometry:  10/12/2015 - Moderate restrictive defect, significant interval improvement in FVC  and FEV1. Spirometry 4/11/0216 - moderate restrictive and obstructive defects, no significant interval change. CXR 10/2016-linear density in both lung bases. Could reflect some atelectasis and/or scarring  Spirometry 11/17/2016 - moderate restrictive defect. Spirometry 2/14/2018-moderate obstructive and restrictive defects, no significant interval change. CXR 2/8/2019-suboptimal exposure Technique. Atelectasis or consolidation left lower lobe. Spirometry 2/20/2019 interval decline in FVC, no significant change in FEV1  CXR 4/9/2019-clearing of left lower lobe infiltrate, no acute process. Spirometry 10/7/2019-moderately severe restrictive defect, no significant change. Ambulatory oximetry 3/1/2021-baseline saturation 96% room air at rest, 86 room air with ambulation. 92% on 2 lpm w/ exertion. CXR 5/11/2021-overall improvement in bilateral airspace opacities, mild bibasilar atelectasis/infiltrates present. CXR 9/1/2021-clear lungs, no acute cardiopulmonary process. Spirometry 3/1/2022-severe obstructive defect with decreased FVC, interval improvement in FVC.

## 2022-10-27 NOTE — PROGRESS NOTES
Dr. Dorothea De La Rosa reviewed chart, states rep NOT needed DOS. No order received. Ok to proceed.

## 2022-10-28 ENCOUNTER — ANESTHESIA (OUTPATIENT)
Dept: SURGERY | Age: 77
End: 2022-10-28
Payer: MEDICARE

## 2022-10-28 ENCOUNTER — HOSPITAL ENCOUNTER (OUTPATIENT)
Age: 77
Setting detail: OUTPATIENT SURGERY
Discharge: HOME | End: 2022-10-28
Attending: STUDENT IN AN ORGANIZED HEALTH CARE EDUCATION/TRAINING PROGRAM | Admitting: STUDENT IN AN ORGANIZED HEALTH CARE EDUCATION/TRAINING PROGRAM
Payer: MEDICARE

## 2022-10-28 ENCOUNTER — APPOINTMENT (OUTPATIENT)
Dept: INTERVENTIONAL RADIOLOGY/VASCULAR | Age: 77
End: 2022-10-28
Attending: STUDENT IN AN ORGANIZED HEALTH CARE EDUCATION/TRAINING PROGRAM
Payer: MEDICARE

## 2022-10-28 VITALS
OXYGEN SATURATION: 94 % | HEIGHT: 72 IN | SYSTOLIC BLOOD PRESSURE: 113 MMHG | TEMPERATURE: 97.4 F | DIASTOLIC BLOOD PRESSURE: 58 MMHG | HEART RATE: 64 BPM | RESPIRATION RATE: 14 BRPM | BODY MASS INDEX: 28.85 KG/M2 | WEIGHT: 213 LBS

## 2022-10-28 LAB
BACTERIA SPEC CULT: NORMAL
GLUCOSE BLD STRIP.AUTO-MCNC: 82 MG/DL (ref 65–100)
GLUCOSE BLD STRIP.AUTO-MCNC: 97 MG/DL (ref 65–100)
HGB BLD-MCNC: 9.3 G/DL (ref 13.6–17.2)
SERVICE CMNT-IMP: NORMAL

## 2022-10-28 PROCEDURE — 82962 GLUCOSE BLOOD TEST: CPT

## 2022-10-28 PROCEDURE — 85018 HEMOGLOBIN: CPT

## 2022-10-28 PROCEDURE — 2500000003 HC RX 250 WO HCPCS: Performed by: NURSE ANESTHETIST, CERTIFIED REGISTERED

## 2022-10-28 PROCEDURE — 6360000002 HC RX W HCPCS: Performed by: NURSE ANESTHETIST, CERTIFIED REGISTERED

## 2022-10-28 PROCEDURE — 3700000000 HC ANESTHESIA ATTENDED CARE: Performed by: STUDENT IN AN ORGANIZED HEALTH CARE EDUCATION/TRAINING PROGRAM

## 2022-10-28 PROCEDURE — C1769 GUIDE WIRE: HCPCS

## 2022-10-28 PROCEDURE — 2500000003 HC RX 250 WO HCPCS: Performed by: STUDENT IN AN ORGANIZED HEALTH CARE EDUCATION/TRAINING PROGRAM

## 2022-10-28 PROCEDURE — 6360000004 HC RX CONTRAST MEDICATION: Performed by: STUDENT IN AN ORGANIZED HEALTH CARE EDUCATION/TRAINING PROGRAM

## 2022-10-28 PROCEDURE — 6360000002 HC RX W HCPCS: Performed by: STUDENT IN AN ORGANIZED HEALTH CARE EDUCATION/TRAINING PROGRAM

## 2022-10-28 PROCEDURE — 7100000001 HC PACU RECOVERY - ADDTL 15 MIN: Performed by: STUDENT IN AN ORGANIZED HEALTH CARE EDUCATION/TRAINING PROGRAM

## 2022-10-28 PROCEDURE — 7100000010 HC PHASE II RECOVERY - FIRST 15 MIN: Performed by: STUDENT IN AN ORGANIZED HEALTH CARE EDUCATION/TRAINING PROGRAM

## 2022-10-28 PROCEDURE — 75710 ARTERY X-RAYS ARM/LEG: CPT | Performed by: STUDENT IN AN ORGANIZED HEALTH CARE EDUCATION/TRAINING PROGRAM

## 2022-10-28 PROCEDURE — 3600000017 HC SURGERY HYBRID ADDL 15MIN: Performed by: STUDENT IN AN ORGANIZED HEALTH CARE EDUCATION/TRAINING PROGRAM

## 2022-10-28 PROCEDURE — 36246 INS CATH ABD/L-EXT ART 2ND: CPT | Performed by: STUDENT IN AN ORGANIZED HEALTH CARE EDUCATION/TRAINING PROGRAM

## 2022-10-28 PROCEDURE — 2580000003 HC RX 258: Performed by: ANESTHESIOLOGY

## 2022-10-28 PROCEDURE — 3600000007 HC SURGERY HYBRID BASE: Performed by: STUDENT IN AN ORGANIZED HEALTH CARE EDUCATION/TRAINING PROGRAM

## 2022-10-28 PROCEDURE — 2709999900 HC NON-CHARGEABLE SUPPLY: Performed by: STUDENT IN AN ORGANIZED HEALTH CARE EDUCATION/TRAINING PROGRAM

## 2022-10-28 PROCEDURE — 7100000011 HC PHASE II RECOVERY - ADDTL 15 MIN: Performed by: STUDENT IN AN ORGANIZED HEALTH CARE EDUCATION/TRAINING PROGRAM

## 2022-10-28 PROCEDURE — 82565 ASSAY OF CREATININE: CPT

## 2022-10-28 PROCEDURE — 7100000000 HC PACU RECOVERY - FIRST 15 MIN: Performed by: STUDENT IN AN ORGANIZED HEALTH CARE EDUCATION/TRAINING PROGRAM

## 2022-10-28 PROCEDURE — 3700000001 HC ADD 15 MINUTES (ANESTHESIA): Performed by: STUDENT IN AN ORGANIZED HEALTH CARE EDUCATION/TRAINING PROGRAM

## 2022-10-28 RX ORDER — SODIUM CHLORIDE, SODIUM LACTATE, POTASSIUM CHLORIDE, CALCIUM CHLORIDE 600; 310; 30; 20 MG/100ML; MG/100ML; MG/100ML; MG/100ML
INJECTION, SOLUTION INTRAVENOUS CONTINUOUS
Status: DISCONTINUED | OUTPATIENT
Start: 2022-10-28 | End: 2022-10-28 | Stop reason: HOSPADM

## 2022-10-28 RX ORDER — HYDROMORPHONE HYDROCHLORIDE 2 MG/ML
0.25 INJECTION, SOLUTION INTRAMUSCULAR; INTRAVENOUS; SUBCUTANEOUS EVERY 5 MIN PRN
Status: DISCONTINUED | OUTPATIENT
Start: 2022-10-28 | End: 2022-10-28 | Stop reason: HOSPADM

## 2022-10-28 RX ORDER — HEPARIN SODIUM 200 [USP'U]/100ML
INJECTION, SOLUTION INTRAVENOUS CONTINUOUS PRN
Status: DISCONTINUED | OUTPATIENT
Start: 2022-10-28 | End: 2022-10-28 | Stop reason: HOSPADM

## 2022-10-28 RX ORDER — LIDOCAINE HYDROCHLORIDE 10 MG/ML
INJECTION, SOLUTION INFILTRATION; PERINEURAL PRN
Status: DISCONTINUED | OUTPATIENT
Start: 2022-10-28 | End: 2022-10-28 | Stop reason: HOSPADM

## 2022-10-28 RX ORDER — ONDANSETRON 2 MG/ML
4 INJECTION INTRAMUSCULAR; INTRAVENOUS
Status: DISCONTINUED | OUTPATIENT
Start: 2022-10-28 | End: 2022-10-28 | Stop reason: HOSPADM

## 2022-10-28 RX ORDER — SODIUM CHLORIDE 9 MG/ML
INJECTION, SOLUTION INTRAVENOUS PRN
Status: DISCONTINUED | OUTPATIENT
Start: 2022-10-28 | End: 2022-10-28 | Stop reason: HOSPADM

## 2022-10-28 RX ORDER — SODIUM CHLORIDE 0.9 % (FLUSH) 0.9 %
5-40 SYRINGE (ML) INJECTION PRN
Status: DISCONTINUED | OUTPATIENT
Start: 2022-10-28 | End: 2022-10-28 | Stop reason: HOSPADM

## 2022-10-28 RX ORDER — LIDOCAINE HYDROCHLORIDE 20 MG/ML
INJECTION, SOLUTION EPIDURAL; INFILTRATION; INTRACAUDAL; PERINEURAL PRN
Status: DISCONTINUED | OUTPATIENT
Start: 2022-10-28 | End: 2022-10-28 | Stop reason: SDUPTHER

## 2022-10-28 RX ORDER — HYDROMORPHONE HYDROCHLORIDE 2 MG/ML
0.5 INJECTION, SOLUTION INTRAMUSCULAR; INTRAVENOUS; SUBCUTANEOUS EVERY 5 MIN PRN
Status: DISCONTINUED | OUTPATIENT
Start: 2022-10-28 | End: 2022-10-28 | Stop reason: HOSPADM

## 2022-10-28 RX ORDER — HEPARIN SODIUM 1000 [USP'U]/ML
INJECTION, SOLUTION INTRAVENOUS; SUBCUTANEOUS PRN
Status: DISCONTINUED | OUTPATIENT
Start: 2022-10-28 | End: 2022-10-28 | Stop reason: SDUPTHER

## 2022-10-28 RX ORDER — PROPOFOL 10 MG/ML
INJECTION, EMULSION INTRAVENOUS CONTINUOUS PRN
Status: DISCONTINUED | OUTPATIENT
Start: 2022-10-28 | End: 2022-10-28 | Stop reason: SDUPTHER

## 2022-10-28 RX ORDER — SODIUM CHLORIDE 0.9 % (FLUSH) 0.9 %
5-40 SYRINGE (ML) INJECTION EVERY 12 HOURS SCHEDULED
Status: DISCONTINUED | OUTPATIENT
Start: 2022-10-28 | End: 2022-10-28 | Stop reason: HOSPADM

## 2022-10-28 RX ORDER — KETAMINE HYDROCHLORIDE 50 MG/ML
INJECTION, SOLUTION, CONCENTRATE INTRAMUSCULAR; INTRAVENOUS PRN
Status: DISCONTINUED | OUTPATIENT
Start: 2022-10-28 | End: 2022-10-28 | Stop reason: SDUPTHER

## 2022-10-28 RX ORDER — DEXMEDETOMIDINE HYDROCHLORIDE 4 UG/ML
INJECTION, SOLUTION INTRAVENOUS CONTINUOUS PRN
Status: DISCONTINUED | OUTPATIENT
Start: 2022-10-28 | End: 2022-10-28 | Stop reason: SDUPTHER

## 2022-10-28 RX ORDER — LIDOCAINE HYDROCHLORIDE 10 MG/ML
1 INJECTION, SOLUTION INFILTRATION; PERINEURAL
Status: DISCONTINUED | OUTPATIENT
Start: 2022-10-28 | End: 2022-10-28 | Stop reason: HOSPADM

## 2022-10-28 RX ADMIN — DEXMEDETOMIDINE HYDROCHLORIDE 0.2 MCG/KG/HR: 4 INJECTION, SOLUTION INTRAVENOUS at 15:14

## 2022-10-28 RX ADMIN — LIDOCAINE HYDROCHLORIDE 40 MG: 20 INJECTION, SOLUTION EPIDURAL; INFILTRATION; INTRACAUDAL; PERINEURAL at 15:14

## 2022-10-28 RX ADMIN — HEPARIN SODIUM 5000 UNITS: 1000 INJECTION, SOLUTION INTRAVENOUS; SUBCUTANEOUS at 15:36

## 2022-10-28 RX ADMIN — SODIUM CHLORIDE, SODIUM LACTATE, POTASSIUM CHLORIDE, AND CALCIUM CHLORIDE: 600; 310; 30; 20 INJECTION, SOLUTION INTRAVENOUS at 14:59

## 2022-10-28 RX ADMIN — PROPOFOL 50 MCG/KG/MIN: 10 INJECTION, EMULSION INTRAVENOUS at 15:12

## 2022-10-28 RX ADMIN — KETAMINE HYDROCHLORIDE 20 MG: 50 INJECTION, SOLUTION INTRAMUSCULAR; INTRAVENOUS at 15:15

## 2022-10-28 RX ADMIN — PROPOFOL 50 MCG/KG/MIN: 10 INJECTION, EMULSION INTRAVENOUS at 15:14

## 2022-10-28 ASSESSMENT — LIFESTYLE VARIABLES: SMOKING_STATUS: 1

## 2022-10-28 ASSESSMENT — COPD QUESTIONNAIRES: CAT_SEVERITY: MODERATE

## 2022-10-28 ASSESSMENT — PAIN - FUNCTIONAL ASSESSMENT: PAIN_FUNCTIONAL_ASSESSMENT: 0-10

## 2022-10-28 NOTE — DISCHARGE INSTRUCTIONS
Arteriogram: What to Expect at [address] [place] [address] Drive inserted a thin, flexible tube (catheter) into a blood vessel in your groin. In some cases, the catheter is placed in a blood vessel in the arm. After an arteriogram, your groin or arm may have a bruise and feel sore for a day or two. You can do light activities around the house but nothing strenuous for several days. Your doctor may give you specific instructions on when you can do your normal activities again, such as driving and going back to work. This care sheet gives you a general idea about how long it will take for you to recover. But each person recovers at a different pace. Follow the steps below to feel better as quickly as possible. How can you care for yourself at home? Activity  Do not do strenuous exercise and do not lift, pull, or push anything heavy until your doctor says it is okay. This may be for a day or two. You can walk around the house and do light activity, such as cooking. You may shower 24 to 48 hours after the procedure, if your doctor okays it. Pat the incision dry. Do not take a bath for 1 week, or until your doctor tells you it is okay. If the catheter was placed in your groin, try not to walk up stairs for the first couple of days. If your doctor recommends it, get more exercise. Walking is a good choice. Bit by bit, increase the amount you walk every day. Try for at least 30 minutes on most days of the week. Diet  Drink plenty of fluids to help your body flush out the dye. If you have kidney, heart, or liver disease and have to limit fluids, talk with your doctor before you increase the amount of fluids you drink. You can eat your normal diet. If your stomach is upset, try bland, low-fat foods like plain rice, broiled chicken, toast, and yogurt. Medicines  Be safe with medicines. Read and follow all instructions on the label. If the doctor gave you a prescription medicine for pain, take it as prescribed.   If you are not taking a prescription pain medicine, ask your doctor if you can take an over-the-counter medicine. If you take blood thinners, such as warfarin (Coumadin), clopidogrel (Plavix), or aspirin, be sure to talk to your doctor. He or she will tell you if and when to start taking those medicines again. Make sure that you understand exactly what your doctor wants you to do. Your doctor will tell you if and when you can restart your medicines. He or she will also give you instructions about taking any new medicines. Care of the catheter site  You will have a dressing over the cut (incision). A dressing helps the incision heal and protects it. Your doctor will tell you how to take care of this. Put ice or a cold pack on the area for 10 to 20 minutes at a time to help with soreness or swelling. Put a thin cloth between the ice and your skin. Follow-up care is a key part of your treatment and safety. Be sure to make and go to all appointments, and call your doctor if you are having problems. It's also a good idea to know your test results and keep a list of the medicines you take. When should you call for help? Call 911 anytime you think you may need emergency care. For example, call if:  You passed out (lost consciousness). You have severe trouble breathing. You have sudden chest pain and shortness of breath, or you cough up blood. Call your doctor now or seek immediate medical care if:  You are bleeding from the area where the catheter was put in your artery. You have a fast-growing, painful lump at the catheter site. You have signs of infection, such as: Increased pain, swelling, warmth, or redness. Red streaks leading from the incision. Pus draining from the incision. A fever.   After general anesthesia or intravenous sedation, for 24 hours or while taking prescription Narcotics:  Limit your activities  A responsible adult needs to be with you for the next 24 hours  Do not drive and operate hazardous machinery  Do not make important personal or business decisions  Do not drink alcoholic beverages  If you have not urinated within 8 hours after discharge, and you are experiencing discomfort from urinary retention, please go to the nearest ED. If you have sleep apnea and have a CPAP machine, please use it for all naps and sleeping. Please use caution when taking narcotics and any of your home medications that may cause drowsiness. *  Please give a list of your current medications to your Primary Care Provider. *  Please update this list whenever your medications are discontinued, doses are      changed, or new medications (including over-the-counter products) are added. *  Please carry medication information at all times in case of emergency situations. These are general instructions for a healthy lifestyle:  No smoking/ No tobacco products/ Avoid exposure to second hand smoke  Surgeon General's Warning:  Quitting smoking now greatly reduces serious risk to your health. Obesity, smoking, and sedentary lifestyle greatly increases your risk for illness  A healthy diet, regular physical exercise & weight monitoring are important for maintaining a healthy lifestyle    You may be retaining fluid if you have a history of heart failure or if you experience any of the following symptoms:  Weight gain of 3 pounds or more overnight or 5 pounds in a week, increased swelling in our hands or feet or shortness of breath while lying flat in bed. Please call your doctor as soon as you notice any of these symptoms; do not wait until your next office visit.

## 2022-10-28 NOTE — PROGRESS NOTES
's pre-procedure visit and prayer with patient and family as requested.     Arron Suazo MDiv, BS  Board Certified  Today's Date: 5/25/2020  Patient Name: Carolynn Trammell  Date of admission: 5/22/2020 11:53 AM  Patient's age: 70 y.o., 1952  Admission Dx: Diverticulitis of large intestine with perforation and abscess without bleeding [K57.20]    Reason for Consult: management recommendations  Requesting Physician: Patience Espinal MD    CHIEF COMPLAINT: Abdominal pain. Nausea. INTERIM HISTORY  The patient is seen and evaluated, the pain persists. CT reviewed, changes in the liver is likely post treatment. The diverticular inflammation changes are worsening per this report. She is put back on liquids      HISTORY OF PRESENT ILLNESS:      The patient is a 70 y.o.  female who is admitted to the hospital for chief complaints of abdominal pain and perforated diverticulitis. 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 Patient also noted to have isolated liver lesion likely fatty cell carcinoma. Patient underwent radioembolization for the liver lesion. Patient also has history of liver disease and hep C. Patient is currently on treatment using immunotherapy/Imfinzi. Patient treatment had to be held for the last couple of times due to ongoing GI problems including nausea vomiting and abdominal discomfort. Patient was also started on a trial of steroids without much improvement. Eventually patient had a CT scan of the abdomen done which raises concern regarding perforated diverticulitis.   Subsequently patient was admitted by surgery team.    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

## 2022-10-28 NOTE — ANESTHESIA POSTPROCEDURE EVALUATION
Department of Anesthesiology  Postprocedure Note    Patient: Brandon Rey  MRN: [id number]  YOB: 1945  Date of evaluation: 10/28/2022      Procedure Summary     Date: 10/28/22 Room / Location: [facility] MAIN OR  / [facility] MAIN OR    Anesthesia Start: 8615 Anesthesia Stop: 5461    Procedure: LEFT LOWER EXTREMITY ARTERIOGRAM / ULTRASOUND GUIDED ACCESS   (Left: Groin) Diagnosis:       Arteriosclerotic vascular disease      (Arteriosclerotic vascular disease [I70.90])    Providers: Marshall Cespedes MD Responsible Provider: Avel Beaulieu MD    Anesthesia Type: general ASA Status: 4          Anesthesia Type: No value filed. Ana Phase I: Ana Score: 9    Ana Phase II:        Anesthesia Post Evaluation    Patient location during evaluation: PACU  Patient participation: complete - patient participated  Level of consciousness: awake and alert  Airway patency: patent  Nausea & Vomiting: no nausea and no vomiting  Complications: no  Cardiovascular status: hemodynamically stable  Respiratory status: acceptable  Hydration status: euvolemic  Comments: Blood pressure 134/68, pulse 61, temperature 97.6 °F (36.4 °C), temperature source Temporal, resp. rate 17, height 6' (1.829 m), weight 213 lb (96.6 kg), SpO2 99 %.       Pt stable for discharge from PACU  Multimodal analgesia pain management approach

## 2022-10-28 NOTE — ANESTHESIA PRE PROCEDURE
Department of Anesthesiology  Preprocedure Note       Name:  Nguyễn Alex   Age:  65 y.o.  :  1945                                          MRN:  [id number]         Date:  10/28/2022      Surgeon: Jose Alberto):  Cem Damon MD    Procedure: Procedure(s):  LEFT LOWER EXTREMITY ARTERIOGRAM Pt has [employer] Pacemaker/ICD    Medications prior to admission:   Prior to Admission medications    Medication Sig Start Date End Date Taking? Authorizing Provider   CPAP Machine MISC by Does not apply route    Historical Provider, MD   albuterol sulfate HFA (PROVENTIL;VENTOLIN;PROAIR) 108 (90 Base) MCG/ACT inhaler USE 2 PUFFS BY INHALATION 4 TIMES DAILY AS NEEDED FOR WHEEZING OR SHORTNESS OF BREATH. Patient prefers ventolin. 10/27/22   AARON Damon CNP   GUAIFENESIN 1200 PO Take 1 tablet by mouth every 12 hours as needed    Historical Provider, MD   tamsulosin (FLOMAX) 0.4 MG capsule Take 0.4 mg by mouth daily    Historical Provider, MD   traMADol (ULTRAM) 50 MG tablet Take 50 mg by mouth every 6 hours as needed for Pain. Taking 1/2 every morning    Historical Provider, MD   acetaminophen (TYLENOL) 500 MG tablet Take 500 mg by mouth every 6 hours as needed for Pain Taking 1/2 two times daily    Historical Provider, MD   doxycycline hyclate (VIBRA-TABS) 100 MG tablet Take 1 tablet by mouth 2 times daily for 10 days 10/19/22 10/29/22  AARON Maciel CNP   valsartan (DIOVAN) 80 MG tablet Take 0.5 tablets by mouth daily  Patient taking differently: Take 80 mg by mouth daily 10/19/22   AARON Maciel CNP   glipiZIDE (GLUCOTROL XL) 10 MG extended release tablet Take 1 tablet by mouth daily 22   Silva Mclaughlin MD   furosemide (LASIX) 20 MG tablet Take 1 tablet by mouth in the morning.   Patient taking differently: Take 20 mg by mouth daily 1/2 tablet 22   Lynne Garcia MD   pantoprazole (PROTONIX) 40 MG tablet Take 1 tablet by mouth in the morning and 1 tablet in the evening. Take before meals. Patient not taking: No sig reported 8/8/22   Cheyenne Glez MD   amLODIPine (NORVASC) 5 MG tablet Take 1 tablet by mouth in the morning. 8/9/22 9/16/22  Cheyenne Glez MD   QUEtiapine (SEROQUEL) 25 MG tablet Take 1 tablet by mouth in the morning.  8/9/22 9/16/22  Cheyenne Glez MD   rosuvastatin (CRESTOR) 10 MG tablet TAKE 1 TABLET BY MOUTH EVERY DAY  Patient taking differently: at bedtime 7/29/22   Arvin Breaux MD   tiZANidine (ZANAFLEX) 2 MG tablet Take 1 tablet by mouth every 8 hours as needed (muscle spasms)  Patient not taking: No sig reported 7/25/22   Arvin Breaux MD   fluticasone-salmeterol (ADVAIR) 250-50 MCG/ACT AEPB diskus inhaler Inhale 1 puff into the lungs in the morning and at bedtime 6/30/22   Historical Provider, MD   amiodarone (CORDARONE) 200 MG tablet Take 200 mg by mouth daily 3/10/22   Ar Automatic Reconciliation   apixaban (ELIQUIS) 5 MG TABS tablet Take 5 mg by mouth every 12 hours 3/10/22   Ar Automatic Reconciliation   ascorbic acid (VITAMIN C) 500 MG tablet Take 500 mg by mouth    Ar Automatic Reconciliation   carvedilol (COREG) 25 MG tablet Take 25 mg by mouth 2 times daily (with meals) 3/10/22   Ar Automatic Reconciliation   Cholecalciferol 50 MCG (2000 UT) TABS Take 2,000 Units by mouth    Ar Automatic Reconciliation   clopidogrel (PLAVIX) 75 MG tablet Take 75 mg by mouth daily 3/11/22   Ar Automatic Reconciliation   fluticasone (FLONASE) 50 MCG/ACT nasal spray USE 1 OR 2 SPRAYS IN EACH NOSTRIL EVERY DAY. 3/15/22   Ar Automatic Reconciliation   latanoprost (XALATAN) 0.005 % ophthalmic solution Apply 1 drop to eye nightly    Ar Automatic Reconciliation   magnesium oxide (MAG-OX) 400 (240 Mg) MG tablet TAKE 1 TABLET BY MOUTH EVERY DAY 1/14/22   Ar Automatic Reconciliation   montelukast (SINGULAIR) 10 MG tablet TAKE 1 TABLET BY MOUTH EVERY DAY AT BEDTIME 3/15/22   Ar Automatic Reconciliation   nitroGLYCERIN (NITROSTAT) 0.4 MG SL tablet Place 0.4 mg under the tongue 3/11/22   Ar Automatic Reconciliation   polyethylene glycol (GLYCOLAX) 17 GM/SCOOP powder Take 17 g by mouth daily 9/21/21   Ar Automatic Reconciliation   metFORMIN (GLUCOPHAGE) 500 MG tablet TAKE TWO TABLETS BY MOUTH 2 TIMES A DAY 3/23/22 8/8/22  Ar Automatic Reconciliation       Current medications:    No current facility-administered medications for this visit. No current outpatient medications on file. Facility-Administered Medications Ordered in Other Visits   Medication Dose Route Frequency Provider Last Rate Last Admin    lidocaine 1 % injection 1 mL  1 mL IntraDERmal Once PRN L Екатерина Patch, MD        lactated ringers infusion   IntraVENous Continuous L Екатерина Patch, MD        sodium chloride flush 0.9 % injection 5-40 mL  5-40 mL IntraVENous 2 times per day L Екатерина Patch, MD        sodium chloride flush 0.9 % injection 5-40 mL  5-40 mL IntraVENous PRN L Екатерина Patch, MD        0.9 % sodium chloride infusion   IntraVENous PRN L Екатерина Patch, MD           Allergies: Allergies   Allergen Reactions    Acetaminophen Hives     Per spouse has small amount of hives, takes 1/2 and tolerates     Azithromycin Hives    Oxaprozin Other (See Comments)     ELEVATED BLOOD PRESSURE    Oxycodone Anxiety       Problem List:    Patient Active Problem List   Diagnosis Code    H/O endarterectomy Z98.890    Arterial degeneration I70.90    Atrial fibrillation ([facility] F96.82    Complicated wound infection T14. 8XXA, L08.9    Allergic rhinitis J30.9    Elevated troponin R77.8    HTN (hypertension) I10    Pulmonary emphysema ([facility]) J43.9    Atherosclerosis of native arteries of extremity with intermittent claudication ([facility]) I70.219    Severe obesity (BMI 35.0-39. 9) with comorbidity ([facility]) E66.01    COPD (chronic obstructive pulmonary disease) ([facility]) J44.9    Personal history of tobacco use, presenting hazards to health Z87.891    Intermittent spinal claudication ([facility]) G95.19    Cigarette nicotine dependence in remission F17.211    Irregular heart beat I49.9    Acute metabolic encephalopathy D54.00    Chest pain R07.9    Hiatal hernia K44.9    Diabetic neuropathy ([facility]) E11.40    Normocytic anemia D64.9    Chronic pain of right knee M25.561, G89.29    Sleep apnea G47.30    Osteoarthritis M19.90    Encounter for long-term (current) drug use Z79.899    Ischemic cardiomyopathy I25.5    Ventricular tachycardia I47.20    VT (ventricular tachycardia) I47.20    Coronary artery disease involving native coronary artery of native heart without angina pectoris I25.10    Benign neoplasm of colon D12.6    PAD (peripheral artery disease) ([facility]) I73.9    Asthma J45.909    Orthostatic hypotension I95.1    Hyperlipidemia E78.5    S/P angioplasty with stent Z95.820    DM (diabetes mellitus) type II, controlled, with peripheral vascular disorder ([facility]) E11.51    Toe laceration S91.119A    Obstructive sleep apnea G47.33    MINI (acute kidney injury) ([facility] N17.9    Type 2 diabetes with nephropathy ([facility]) E11.21    Hypoxia R09.02    Abnormal nuclear cardiac imaging test R93.1    PVC's (premature ventricular contractions) I49.3    Asbestos exposure Z77.090    Sepsis ([address] A41.9    ICD (implantable cardioverter-defibrillator) in place Z95.810    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, vertebra(e), init ([facility]) M80.08XA    General weakness R53.1    Acute cystitis without hematuria N30.00    Low back pain without sciatica M54.50    Back pain M54.9    DNI (do not intubate) Z78.9    Elevated liver enzymes R74.8    Anemia D64.9    Hyponatremia E87.1    Physical debility R53.81    Lumbar discitis M46.46    Psoas muscle abscess ([facility]) K68.12    Non-pressure chronic ulcer of left heel and midfoot with fat layer exposed ([facility] L97.422    Antiplatelet or antithrombotic long-term use Z79.02       Past Medical History:        Diagnosis Date    Acute respiratory failure with hypoxia ([address] 10/23/2014    MINI (acute kidney injury) ([address] 09/15/2014    MINI (acute kidney injury) ([address]     MINI (acute kidney injury) ([address]     Allergic rhinitis 11/10/2015    Asthma 11/10/2015    Benign essential hypertension 11/10/2015    Benign neoplasm of colon 11/10/2015    CAD (coronary artery disease) 11/10/2015    CAD (coronary artery disease) 1998, 1999    mix2, 3 stents [id number]    CAP (community acquired pneumonia) 12/31/2020    Cardiomyopathy ([address] 66/19/4168    Complicated wound infection 11/10/2015    COPD (chronic obstructive pulmonary disease) with emphysema ([address] 11/10/2015    COPD exacerbation ([address] 10/12/2011    COVID-19 12/31/2020    Diabetes mellitus type 2, controlled ([address] 11/10/2015    doesn/t check daily oral meds, A1c 6.0 (8/10/22)    Diabetic neuropathy ([address] 11/10/2015    Disruption of wound, unspecified 10/09/2014    Encounter for long-term (current) use of other high-risk medications 11/10/2015    Extremity atherosclerosis with intermittent claudication ([address] 11/10/2015    H/O endarterectomy 11/10/2015    Hiatal hernia 11/10/2015    History of 2019 novel coronavirus disease (COVID-19)     12/31/2020- hospitalized    Hyperglycemia due to type 1 diabetes mellitus ([address] 11/10/2015    Hyperlipidemia 11/10/2015    ICD (implantable cardioverter-defibrillator) in place 06/16/2022    Monomorphic ventricular tachycardia 12/31/2020    Myocardial infarction ([address] 1999    Myocardial infarction ([address] 11/10/2015    Obesity 11/10/2015    Orthostatic hypotension 11/10/2015    Osteoarthritis 11/10/2015    Peripheral vascular disease ([address] 11/10/2015    PNA (pneumonia) 02/08/2019    PVC (premature ventricular contraction)     Rash 99/84/7214    Seroma complicating a procedure 10/02/2014    Sleep apnea     cpap    Toe laceration     Type 2 diabetes with nephropathy ([address]     Uncontrolled type 2 diabetes mellitus 11/10/2015    Vertiginous syndromes and other disorders of vestibular system 11/10/2015       Past Surgical History:        Procedure Laterality Date    CARDIAC CATHETERIZATION  2018    LV EF=40%. LM:nl. LAD:30-40% mid stenosis. LCX OM1:95% stenosis. RCA:100% occlusion at RV branch.  CATARACT REMOVAL Right 2022    CORONARY ANGIOPLASTY WITH STENT PLACEMENT  2018    LCX OM1:2.5 x 12 Giuseppe RAKESH    CORONARY ANGIOPLASTY WITH STENT PLACEMENT      PA ABDOMEN SURGERY PROC UNLISTED      abdominal cyst removed    PA CARDIAC SURG PROCEDURE UNLIST      stents x3    SPINE SURGERY N/A 2022    LUMBAR 2, LUMBAR 4 KYPHOPLASTY AND ANY OTHER INDICATED LEVELS performed by Zahra Holley MD at [facility] VASCULAR SURGERY  14    Repair of right common femoral artery     VASCULAR SURGERY  14    tiffanie femoral endarterectomy       Social History:    Social History     Tobacco Use    Smoking status: Former     Packs/day: 1.00     Years: 50.00     Pack years: 50.00     Types: Cigarettes     Quit date: 10/2/2011     Years since quittin.0    Smokeless tobacco: Current     Types: Chew   Substance Use Topics    Alcohol use: Yes     Alcohol/week: 0.0 standard drinks                                Ready to quit: Not Answered  Counseling given: Not Answered      Vital Signs (Current): There were no vitals filed for this visit.                                            BP Readings from Last 3 Encounters:   10/28/22 (!) 113/56   10/27/22 104/62   10/25/22 100/61       NPO Status:                                                                                 BMI:   Wt Readings from Last 3 Encounters:   10/28/22 213 lb (96.6 kg)   10/27/22 213 lb (96.6 kg)   10/25/22 242 lb (109.8 kg)     There is no height or weight on file to calculate BMI.    CBC:   Lab Results   Component Value Date/Time    WBC 10.3 10/19/2022 02:22 PM    RBC 3.26 10/19/2022 02:22 PM    HGB 9.8 10/19/2022 02:22 PM    HCT 32.5 10/19/2022 02:22 PM    MCV 99.7 10/19/2022 02:22 PM    RDW 17.2 10/19/2022 02:22 PM     10/19/2022 02:22 PM       CMP:   Lab Results   Component Value Date/Time     10/19/2022 02:22 PM    K 4.0 10/19/2022 02:22 PM     10/19/2022 02:22 PM    CO2 29 10/19/2022 02:22 PM    BUN 15 10/19/2022 02:22 PM    CREATININE 1.20 10/19/2022 02:22 PM    GFRAA >60 09/16/2022 04:42 AM    AGRATIO 1.3 03/04/2022 03:57 PM    LABGLOM >60 10/19/2022 02:22 PM    GLUCOSE 156 10/19/2022 02:22 PM    PROT 6.5 10/19/2022 02:22 PM    CALCIUM 9.2 10/19/2022 02:22 PM    BILITOT 0.5 10/19/2022 02:22 PM    ALKPHOS 116 10/19/2022 02:22 PM    ALKPHOS 57 03/04/2022 03:57 PM    AST 28 10/19/2022 02:22 PM    ALT 78 10/19/2022 02:22 PM       POC Tests:   No results for input(s): POCGLU, POCNA, POCK, POCCL, POCBUN, POCHEMO, POCHCT in the last 72 hours.     Coags:   Lab Results   Component Value Date/Time    PROTIME 16.3 07/30/2022 09:53 AM    INR 1.3 07/30/2022 09:53 AM    APTT 63.6 08/02/2022 05:41 AM    APTT 24.3 03/04/2022 03:57 PM       HCG (If Applicable): No results found for: PREGTESTUR, PREGSERUM, HCG, HCGQUANT     ABGs:   Lab Results   Component Value Date/Time    PHART 7.43 05/11/2021 09:14 PM    PO2ART 51 05/11/2021 09:14 PM    EPB3YIR 35.7 05/11/2021 09:14 PM    WAX3BUR 23.5 05/11/2021 09:14 PM    BEART 1 01/06/2021 10:51 AM        Type & Screen (If Applicable):  No results found for: LABABO, LABRH    Drug/Infectious Status (If Applicable):  Lab Results   Component Value Date/Time    HEPCAB NONREACTIVE 07/29/2022 07:18 AM       COVID-19 Screening (If Applicable):   Lab Results   Component Value Date/Time    COVID19 Not detected 08/25/2022 09:00 AM           Anesthesia Evaluation  Patient summary reviewed  Airway: Mallampati: II  TM distance: >3 FB   Neck ROM: limited  Mouth opening: > = 3 FB   Dental:    (+) upper dentures and partials      Pulmonary: breath sounds clear to auscultation  (+) COPD: moderate,  sleep apnea:  asthma: current smoker (Former)                          ROS comment: Hx Acute respiratory failure with hypoxia   Cardiovascular:  Exercise tolerance: poor (<4 METS), Ambulates with walker since kyphoplasty 7/22  (+) hypertension:, pacemaker: AICD, past MI:, CAD:, CABG/stent (Stents x 5):, dysrhythmias (Hx VT): atrial fibrillation and PVC, CHF (ICM): systolic, hyperlipidemia        Rhythm: regular  Rate: normal                 ROS comment: DIRK 3/2022:    Left Ventricle: Left ventricle is moderately dilated. Mildly increased wall thickness. Akinesis of the following segments: basal inferior, basal inferolateral, mid inferior, mid inferolateral, apical lateral and apical inferior. Severely reduced left ventricular systolic function with a visually estimated EF of 30 - 35%.   Mitral Valve: Mild annular calcification of the mitral valve. Mild transvalvular regurgitation.   Left Atrium: Left atrium is moderately dilated. No left atrial appendage thrombus noted. No left atrial appendage mass noted.   Right Atrium: Right atrium is moderately dilated       Neuro/Psych:                ROS comment: Diabetic neuropathy GI/Hepatic/Renal:   (+) hiatal hernia,      Morbid obesity: severe. Endo/Other:    (+) DiabetesType II DM, , blood dyscrasia: anemia:., .                 Abdominal:             Vascular:   + PVD, aortic or cerebral, . Other Findings:             Anesthesia Plan      general     ASA 4       Induction: intravenous. Anesthetic plan and risks discussed with patient and spouse.                         Kimberly Farrell MD   10/28/2022

## 2022-10-28 NOTE — PERIOP NOTE
Directly informed patient and or family member of pre op arrival time [address] on 10/28. All questions answered. Pre op instructions reviewed. Left contact information for any additional questions or needs.
Left detailed message with arrival time of 1015 on 10/28/22, requested a call back for confirmation.
Pt and wife Lu given discharge instructions at bedside, both verbalize understanding. All questions answered.
morning of surgery  > All piercings must be removed prior to arrival.    > Leave all valuables (money and jewelry) at home but bring insurance card and ID on DOS.   > You may be required to pay a deductible or co-pay on the day of your procedure. You can pre-pay by calling [phone] if your surgery is at the [facility] or [phone] if your surgery is at the [facility]. > Do not wear make-up, nail polish, lotions, cologne, perfumes, powders, or oil on skin. Artificial nails are not permitted.

## 2022-10-29 NOTE — OP NOTE
[address]   [address] FAX: [phone]        Pre-Op diagnosis:   Critical limb ischemia left lower extremity with tissue lose     Post-Op diagnosis:    Critical limb ischemia left lower extremity with tissue lose    Surgeon: Marizol Velasquez MD     Procedure:   Retrograde right common femoral access under ultrasound guidance with 6 Italian sheath Mynx closure  Aortogram  Pelvic angiogram  Selective catheter placement left common femoral artery with DSA to left foot     Complications: None     EBL:      Anesthesia:      Radiographic findings: Aortogram: Widely patent infrarenal aorta with exception of the distal aspect with less than 50% stenosis  Pelvic angiogram: Widely patent common, external, and internal iliac arteries  Left lower extremity angiogram: Widely patent common femoral, left profunda arteries. SFA is heavily diseased with complete occlusion of the mid SFA. Blind segment reconstitution of 1 cm of distal SFA and and again at 2 cm of proximal popliteal artery with reocclusion of popliteal artery and reconstitution of popliteal artery at the knee. Patent anterior tibial and posterior tibial arteries to the foot. Excessive movement during angiogram unable to identify peroneal artery. Description of procedure: After informed consent was obtained the patient was brought to the operating room and placed in supine position. Appropriate anesthesia was administered and monitored anesthesia care was performed. Bilateral groins were prepped and draped in sterile fashion. Timeout was performed confirming patient identity procedure. Right groin was then accessed with a micropuncture needle, wire, and sheath under ultrasound guidance. Micropuncture sheath was upsized to a 6 [employer] Meghan sheath over a J-wire. Patient was systemically heparinized.   Advantage Glidewire and Omni Flush catheter was advanced to level the juxtarenal aorta and connected power injector. Aortogram was obtained. Catheter was withdrawn to the aortic bifurcation. Pelvic angiogram was obtained. Omni Flush catheter and advantage Glidewire were then used to flex over the aortic bifurcation and catheter was placed in the left common femoral artery. DSA was then obtained the left foot. Catheter and wire were then withdrawn. 6 Mynx closure was placed. Pressure was held until hemostasis was adequate. No immediate complications were identified. Sterile dressing was applied. Patient was transferred to the postop care unit in stable condition.

## 2022-10-31 ENCOUNTER — TELEPHONE (OUTPATIENT)
Dept: INTERNAL MEDICINE CLINIC | Facility: CLINIC | Age: 77
End: 2022-10-31

## 2022-10-31 ENCOUNTER — HOSPITAL ENCOUNTER (OUTPATIENT)
Dept: WOUND CARE | Age: 77
Discharge: HOME | End: 2022-10-31
Payer: MEDICARE

## 2022-10-31 VITALS
SYSTOLIC BLOOD PRESSURE: 92 MMHG | RESPIRATION RATE: 18 BRPM | WEIGHT: 213 LBS | DIASTOLIC BLOOD PRESSURE: 46 MMHG | BODY MASS INDEX: 28.85 KG/M2 | HEART RATE: 61 BPM | HEIGHT: 72 IN | TEMPERATURE: 98 F

## 2022-10-31 DIAGNOSIS — I73.9 PAD (PERIPHERAL ARTERY DISEASE) (HCC): ICD-10-CM

## 2022-10-31 DIAGNOSIS — Z98.890 H/O ENDARTERECTOMY: ICD-10-CM

## 2022-10-31 DIAGNOSIS — R53.81 PHYSICAL DEBILITY: ICD-10-CM

## 2022-10-31 DIAGNOSIS — E11.42 DIABETIC POLYNEUROPATHY ASSOCIATED WITH TYPE 2 DIABETES MELLITUS (HCC): ICD-10-CM

## 2022-10-31 DIAGNOSIS — E11.51 DM (DIABETES MELLITUS) TYPE II, CONTROLLED, WITH PERIPHERAL VASCULAR DISORDER (HCC): ICD-10-CM

## 2022-10-31 DIAGNOSIS — L97.422 NON-PRESSURE CHRONIC ULCER OF LEFT HEEL AND MIDFOOT WITH FAT LAYER EXPOSED (HCC): Primary | ICD-10-CM

## 2022-10-31 DIAGNOSIS — Z79.02 ANTIPLATELET OR ANTITHROMBOTIC LONG-TERM USE: ICD-10-CM

## 2022-10-31 DIAGNOSIS — I70.90: ICD-10-CM

## 2022-10-31 LAB — CREAT BLD-MCNC: 0.8 MG/DL (ref 0.8–1.5)

## 2022-10-31 PROCEDURE — 97597 DBRDMT OPN WND 1ST 20 CM/<: CPT

## 2022-10-31 PROCEDURE — 99214 OFFICE O/P EST MOD 30 MIN: CPT | Performed by: SURGERY

## 2022-10-31 RX ORDER — LIDOCAINE HYDROCHLORIDE 40 MG/ML
SOLUTION TOPICAL ONCE
Status: CANCELLED | OUTPATIENT
Start: 2022-10-31 | End: 2022-10-31

## 2022-10-31 RX ORDER — LIDOCAINE HYDROCHLORIDE 20 MG/ML
JELLY TOPICAL ONCE
Status: CANCELLED | OUTPATIENT
Start: 2022-10-31 | End: 2022-10-31

## 2022-10-31 RX ORDER — GENTAMICIN SULFATE 1 MG/G
OINTMENT TOPICAL ONCE
Status: CANCELLED | OUTPATIENT
Start: 2022-10-31 | End: 2022-10-31

## 2022-10-31 RX ORDER — LIDOCAINE 40 MG/G
CREAM TOPICAL ONCE
Status: CANCELLED | OUTPATIENT
Start: 2022-10-31 | End: 2022-10-31

## 2022-10-31 RX ORDER — CLOBETASOL PROPIONATE 0.5 MG/G
OINTMENT TOPICAL ONCE
Status: CANCELLED | OUTPATIENT
Start: 2022-10-31 | End: 2022-10-31

## 2022-10-31 RX ORDER — GINSENG 100 MG
CAPSULE ORAL ONCE
Status: CANCELLED | OUTPATIENT
Start: 2022-10-31 | End: 2022-10-31

## 2022-10-31 RX ORDER — BACITRACIN ZINC AND POLYMYXIN B SULFATE 500; 1000 [USP'U]/G; [USP'U]/G
OINTMENT TOPICAL ONCE
Status: CANCELLED | OUTPATIENT
Start: 2022-10-31 | End: 2022-10-31

## 2022-10-31 RX ORDER — BACITRACIN, NEOMYCIN, POLYMYXIN B 400; 3.5; 5 [USP'U]/G; MG/G; [USP'U]/G
OINTMENT TOPICAL ONCE
Status: CANCELLED | OUTPATIENT
Start: 2022-10-31 | End: 2022-10-31

## 2022-10-31 RX ORDER — BETAMETHASONE DIPROPIONATE 0.05 %
OINTMENT (GRAM) TOPICAL ONCE
Status: CANCELLED | OUTPATIENT
Start: 2022-10-31 | End: 2022-10-31

## 2022-10-31 RX ORDER — LIDOCAINE 50 MG/G
OINTMENT TOPICAL ONCE
Status: CANCELLED | OUTPATIENT
Start: 2022-10-31 | End: 2022-10-31

## 2022-10-31 NOTE — WOUND CARE
Discharge Instructions for  Windy Fairchild  [facility] [address], [address] [place] [address]  Phone [phone]   Fax [phone]      NAME:  Govind Mann OF BIRTH:  1945  MEDICAL RECORD NUMBER:  [id number]  DATE:  10/31/2022    Return Appointment:   1 week with Sukumar Lopez DO    Instructions:   Left Lateral Heel:  Cleanse with Normal Saline  Paint with Betadine Daily  Cover with Bandaid/Bordered Foam or ABD and wrap with Rolled Gauze. May leave open to air while reclining. Float Heel When Patient in [address]  When in [address] and When Heel is Unable to be [employer]     Vascular Consult: November 14, 2022  Vascular Surgery: November 19, 2022     [facility] for Wound Care and Assessment        Should you experience increased redness, swelling, pain, foul odor, size of wound(s), or have a temperature over 101 degrees please contact the [address] [place] [address] Road at [phone] or if after hours contact your primary care physician or go to the hospital emergency department. PLEASE NOTE: IF YOU ARE UNABLE TO OBTAIN WOUND SUPPLIES, CONTINUE TO USE THE SUPPLIES YOU HAVE AVAILABLE UNTIL YOU ARE ABLE TO REACH US. IT IS MOST IMPORTANT TO KEEP THE WOUND COVERED AT ALL TIMES. Electronically signed Gilman Lefort.  Grace Garcia on 10/31/2022 at 6:11 PM

## 2022-10-31 NOTE — DISCHARGE INSTRUCTIONS
Left Lateral Heel:  Cleanse with Normal Saline  Paint with Betadine Daily  Cover with Bandaid/Bordered Foam or ABD and wrap with Rolled Gauze. May leave open to air while reclining.      Float Heel When Patient in [address]  When in [address] and When Heel is Unable to be [facility]     Vascular Consult: November 14, 2022  Vascular Surgery: November 19, 2022     [facility] for Wound Care and Assessment

## 2022-10-31 NOTE — TELEPHONE ENCOUNTER
----- Message from Jackeline Garcia sent at 10/31/2022  1:42 PM EDT -----  Subject: Message to Provider    QUESTIONS  Information for Provider? Patient is concerned about letter they received   regarding decreasing medication. Please call patient back or address at   appt 11/3/22  ---------------------------------------------------------------------------  --------------  Pretty Delacruz INFO  [phone]; OK to leave message on voicemail  ---------------------------------------------------------------------------  --------------  SCRIPT ANSWERS  Relationship to Patient? Other  Representative Name? wife  Is the Representative on the appropriate HIPAA document in Epic?  Yes

## 2022-10-31 NOTE — FLOWSHEET NOTE
10/31/22 1115   Wound 09/09/22 Heel Left;Lateral   Date First Assessed/Time First Assessed: 09/09/22 1535   Present on Hospital Admission: Yes  Primary Wound Type: Diabetic Ulcer  Location: Heel  Wound Location Orientation: Left;Lateral   Wound Image    Wound Etiology Diabetic Martinez 2   Dressing Status Old drainage noted   Wound Cleansed Cleansed with saline   Dressing/Treatment Betadine swabs/povidone iodine   Offloading for Diabetic Foot Ulcers Offloading boot  (Rooke Boot)   Wound Length (cm) 2.4 cm   Wound Width (cm) 2.7 cm   Wound Depth (cm) 0.2 cm   Wound Surface Area (cm^2) 6.48 cm^2   Change in Wound Size % (l*w) -8   Wound Volume (cm^3) 1.296 cm^3   Wound Healing % 50   Wound Assessment Slough;Pink/red   Drainage Amount Small   Drainage Description Serosanguinous   Odor None   Joanna-wound Assessment Hyperkeratosis (callous); Dry/flaky   Margins Attached edges   Wound Thickness Description not for Pressure Injury Full thickness   Patient is currently taking Eliquis and Plavix.

## 2022-10-31 NOTE — PROGRESS NOTES
[address]  Consult Note/H&P/Progress Note      [address] RECORD NUMBER:  [id number]  AGE: 65 y.o. RACE White (non-)  GENDER: male  : 1945  EPISODE DATE:  10/31/2022       Subjective:      CC (Chief Complaint) and HISTORY of PRESENT ILLNESS (HPI):    Alycia Taveras is a 65 y.o. male who presents today for wound/ulcer evaluation. He presents on 10/24/2022 with an ulcer wound on the left heel that has been present for several months. He was in his usual state of poor health when in July he had back surgery which led to a complicated abscess, prolonged hospital stay, and rehabilitation stay which ended just a few weeks ago. He is currently at home. He has significant debility. He is diabetic and has significant peripheral vascular disease with history of bilateral femoral endarterectomies. These were performed several years ago by Dr. Mendel Curie. ABIs were performed in July just prior to his surgery with the JOVANA on the left of 0.6 with plan for follow-up this coming January to monitor. He has been lying in bed without use of Rooke boots and has developed an ulcer on the left heel. He has a history of MRSA infection in his back and was placed on a course of doxycycline. There is no evidence of active cellulitis today. He has had no imaging. He has not had repeat vascular evaluation. There is some description of pain in his leg at rest.  He is not active enough to discern any possible claudication. He is anticoagulated on both Eliquis and Plavix. There is no current dressing care plan. He returns on 10/31/2022. We are very fortunate that he was evaluated by vascular surgery who performed an arteriogram showing significant arterial disease. Unfortunately, his disease is not amenable to percutaneous intervention and will require a bypass in the coming weeks.   Debridement was not aggressively performed today but some loose tissue was selectively debrided and well-tolerated. He has been participating with home therapy including ambulation with a walker. Current dressing is Betadine.       This information was obtained from the patient  The following HPI elements were documented for the patient's wound:  Location: Left heel  Duration: August/September 2022  Severity: Fat layer exposed  Context: Limited activity, much lying in bed, PVD, DM, no offloading  Modifying Factors:  Nothing makes better or worse  Quality: Full-thickness, painful  Timing: Constant  Associated Signs and Symptoms: Tissue loss and possible bout of cellulitis      Ulcer Identification:  Ulcer Type: Diabetic foot ulcer left heel with fat layer exposed  Contributing Factors: Inadequate offloading, PVD, anticoagulated          PAST MEDICAL HISTORY  Past Medical History:   Diagnosis Date    Acute respiratory failure with hypoxia ([address] 10/23/2014    MINI (acute kidney injury) ([address] 09/15/2014    MINI (acute kidney injury) ([address]     MINI (acute kidney injury) ([address]     Allergic rhinitis 11/10/2015    Asthma 11/10/2015    Benign essential hypertension 11/10/2015    Benign neoplasm of colon 11/10/2015    CAD (coronary artery disease) 11/10/2015    CAD (coronary artery disease) 1998, 1999    mix2, 3 stents [id number]    CAP (community acquired pneumonia) 12/31/2020    Cardiomyopathy ([address] 64/57/6235    Complicated wound infection 11/10/2015    COPD (chronic obstructive pulmonary disease) with emphysema ([address] 11/10/2015    COPD exacerbation ([address] 10/12/2011    COVID-19 12/31/2020    Diabetes mellitus type 2, controlled ([address] 11/10/2015    doesn/t check daily oral meds, A1c 6.0 (8/10/22)    Diabetic neuropathy ([address] 11/10/2015    Disruption of wound, unspecified 10/09/2014    Encounter for long-term (current) use of other high-risk medications 11/10/2015    Extremity atherosclerosis with intermittent claudication ([address] 11/10/2015    H/O endarterectomy 11/10/2015 Hiatal hernia 11/10/2015    History of 2019 novel coronavirus disease (COVID-19)     12/31/2020- hospitalized    Hyperglycemia due to type 1 diabetes mellitus ([facility] 11/10/2015    Hyperlipidemia 11/10/2015    ICD (implantable cardioverter-defibrillator) in place 06/16/2022    Monomorphic ventricular tachycardia 12/31/2020    Myocardial infarction [facility]) 1999    Myocardial infarction ([facility] 11/10/2015    Obesity 11/10/2015    Orthostatic hypotension 11/10/2015    Osteoarthritis 11/10/2015    Peripheral vascular disease ([address] 11/10/2015    PNA (pneumonia) 02/08/2019    PVC (premature ventricular contraction)     Rash 31/76/6107    Seroma complicating a procedure 10/02/2014    Sleep apnea     cpap    Toe laceration     Type 2 diabetes with nephropathy ([facility]     Uncontrolled type 2 diabetes mellitus 11/10/2015    Vertiginous syndromes and other disorders of vestibular system 11/10/2015        PAST SURGICAL HISTORY  Past Surgical History:   Procedure Laterality Date    CARDIAC CATHETERIZATION  12/05/2018    LV EF=40%. LM:nl. LAD:30-40% mid stenosis. LCX OM1:95% stenosis. RCA:100% occlusion at RV branch.     CATARACT REMOVAL Right 07/20/2022    CORONARY ANGIOPLASTY WITH STENT PLACEMENT  12/05/2018    LCX OM1:2.5 x 12 [place] RAKESH    CORONARY ANGIOPLASTY WITH STENT PLACEMENT  1999    SC ABDOMEN SURGERY PROC UNLISTED  1960    abdominal cyst removed    SC CARDIAC SURG PROCEDURE UNLIST  1999    stents x3    SPINE SURGERY N/A 07/19/2022    LUMBAR 2, LUMBAR 4 KYPHOPLASTY AND ANY OTHER INDICATED LEVELS performed by Lizeth Smith MD at [facility] MAIN OR    VASCULAR SURGERY  11/5/14    Repair of right common femoral artery     VASCULAR SURGERY  9/11/14    tiffanie femoral endarterectomy    VASCULAR SURGERY Left 10/28/2022    LEFT LOWER EXTREMITY ARTERIOGRAM / ULTRASOUND GUIDED ACCESS   performed by Marshall Cespedes MD at [facility] MAIN OR       FAMILY HISTORY  Family History   Problem Relation Age of Onset    Breast Cancer Mother Hypertension Mother     Other Father         DIVERTICULITIS    Crohn's Disease Sister     Lung Disease Brother         mesothioloma    Diabetes Sister     Heart Attack Father     Heart Disease Father     Stroke Mother        SOCIAL HISTORY  Social History     Tobacco Use    Smoking status: Former     Packs/day: 1.00     Years: 50.00     Pack years: 50.00     Types: Cigarettes     Quit date: 10/2/2011     Years since quittin.0    Smokeless tobacco: Current     Types: Chew    Tobacco comments:     Chews tobacco daily   Vaping Use    Vaping Use: Never used   Substance Use Topics    Alcohol use: Yes     Alcohol/week: 0.0 standard drinks    Drug use: No       ALLERGIES  Allergies   Allergen Reactions    Acetaminophen Hives     Per spouse has small amount of hives, takes 1/2 and tolerates     Azithromycin Hives    Morphine Hallucinations     Muscle twitching. jerking    Oxaprozin Other (See Comments)     ELEVATED BLOOD PRESSURE    Oxycodone Anxiety       MEDICATIONS  Current Outpatient Medications on File Prior to Encounter   Medication Sig Dispense Refill    CPAP Machine MISC by Does not apply route      albuterol sulfate HFA (PROVENTIL;VENTOLIN;PROAIR) 108 (90 Base) MCG/ACT inhaler USE 2 PUFFS BY INHALATION 4 TIMES DAILY AS NEEDED FOR WHEEZING OR SHORTNESS OF BREATH. Patient prefers ventolin. 18 g 11    GUAIFENESIN 1200 PO Take 1 tablet by mouth every 12 hours as needed      tamsulosin (FLOMAX) 0.4 MG capsule Take 0.4 mg by mouth daily      traMADol (ULTRAM) 50 MG tablet Take 50 mg by mouth every 6 hours as needed for Pain.  Taking 1/2 every morning      acetaminophen (TYLENOL) 500 MG tablet Take 500 mg by mouth every 6 hours as needed for Pain Taking 1/2 two times daily      valsartan (DIOVAN) 80 MG tablet Take 0.5 tablets by mouth daily (Patient taking differently: Take 80 mg by mouth daily) 30 tablet 5    glipiZIDE (GLUCOTROL XL) 10 MG extended release tablet Take 1 tablet by mouth daily 30 tablet 0    furosemide (LASIX) 20 MG tablet Take 1 tablet by mouth in the morning. (Patient taking differently: Take 20 mg by mouth daily 1/2 tablet) 60 tablet 3    pantoprazole (PROTONIX) 40 MG tablet Take 1 tablet by mouth in the morning and 1 tablet in the evening. Take before meals. (Patient not taking: No sig reported) 30 tablet 3    [DISCONTINUED] amLODIPine (NORVASC) 5 MG tablet Take 1 tablet by mouth in the morning. 30 tablet 3    [DISCONTINUED] QUEtiapine (SEROQUEL) 25 MG tablet Take 1 tablet by mouth in the morning. 60 tablet 3    rosuvastatin (CRESTOR) 10 MG tablet TAKE 1 TABLET BY MOUTH EVERY DAY (Patient taking differently: at bedtime) 90 tablet 0    tiZANidine (ZANAFLEX) 2 MG tablet Take 1 tablet by mouth every 8 hours as needed (muscle spasms) (Patient not taking: No sig reported) 30 tablet 1    fluticasone-salmeterol (ADVAIR) 250-50 MCG/ACT AEPB diskus inhaler Inhale 1 puff into the lungs in the morning and at bedtime      amiodarone (CORDARONE) 200 MG tablet Take 200 mg by mouth daily      apixaban (ELIQUIS) 5 MG TABS tablet Take 5 mg by mouth every 12 hours      ascorbic acid (VITAMIN C) 500 MG tablet Take 500 mg by mouth      carvedilol (COREG) 25 MG tablet Take 25 mg by mouth 2 times daily (with meals)      Cholecalciferol 50 MCG (2000 UT) TABS Take 2,000 Units by mouth      clopidogrel (PLAVIX) 75 MG tablet Take 75 mg by mouth daily      fluticasone (FLONASE) 50 MCG/ACT nasal spray USE 1 OR 2 SPRAYS IN EACH NOSTRIL EVERY DAY.       latanoprost (XALATAN) 0.005 % ophthalmic solution Apply 1 drop to eye nightly      magnesium oxide (MAG-OX) 400 (240 Mg) MG tablet TAKE 1 TABLET BY MOUTH EVERY DAY      montelukast (SINGULAIR) 10 MG tablet TAKE 1 TABLET BY MOUTH EVERY DAY AT BEDTIME      nitroGLYCERIN (NITROSTAT) 0.4 MG SL tablet Place 0.4 mg under the tongue      polyethylene glycol (GLYCOLAX) 17 GM/SCOOP powder Take 17 g by mouth daily      [DISCONTINUED] metFORMIN (GLUCOPHAGE) 500 MG tablet TAKE TWO TABLETS BY MOUTH 2 TIMES A DAY       No current facility-administered medications on file prior to encounter. REVIEW OF SYSTEMS  The patient has no difficulty with chest pain or shortness of breath. No fever or chills. Comprehensive review of systems was otherwise unremarkable except as noted above. Objective:   Physical Exam:   Recent vitals (if inpt):  Patient Vitals for the past 24 hrs:   Height Weight   10/31/22 1055 6' (1.829 m) 213 lb (96.6 kg)       Ht 6' (1.829 m)   Wt 213 lb (96.6 kg)   BMI 28.89 kg/m²   Wt Readings from Last 3 Encounters:   10/31/22 213 lb (96.6 kg)   10/28/22 213 lb (96.6 kg)   10/27/22 213 lb (96.6 kg)     Constitutional: Alert, oriented, cooperative patient in no acute distress; appears stated age;  General appearance is within normal limits for wound care patient population. See the today's recorded vitals signs and constitutional data. Eyes: Pupils equal; Sclera are clear. EOMs intact; The eyes appear to track and move normally. The sclera are not injected. The conjunctive are clear. The eyelids are normal. There is no scleral icterus. ENMT: There are no obvious external ear, nose, lip or mouth lesions. Nares normal; Neck: Overall contour of the neck is normal with no obvious neck masses. Gross hearing is within normal limits. No obvious neck masses  Resp:  Breathing is non-labored; normal rate and effort; no audible wheezing. CV: RRR;  no JVD; No evidence of cyanosis of the upper extremities. The extremities are perfused without embolic sign, splinter hemorrhages, or petechia. GI: soft and non-distended without acute abnormality noted. Musculoskeletal: unremarkable with normal function. No embolic signs or cyanosis. Neuro:  Oriented; moves all 4; no focal deficits  Psychiatric: Judgement and insight are within normal limits for the wound care population of patients. Patient is oriented to person, place, and time. Recent and remote memory are within normal limits.  Mood and affect are within normal limits. Integumentary (Skin/Wounds)  See inspection of wound(s) below. There are no other skin areas of palpable concern. See wound center documentation including photos in the [facility] [address] Wound Template made part of this record by reference. Wound Care Documentation:    Wound 09/09/22 Heel Left;Lateral (Active)   Wound Image   10/24/22 0848   Wound Etiology Diabetic Martinez 2 10/24/22 0848   Dressing Status Old drainage noted 10/24/22 0848   Wound Cleansed Cleansed with saline 10/24/22 0848   Dressing/Treatment Antibacterial ointment 10/24/22 0848   Offloading for Diabetic Foot Ulcers Other (comment) 10/24/22 0848   Wound Length (cm) 4.3 cm 10/24/22 0848   Wound Width (cm) 3 cm 10/24/22 0848   Wound Depth (cm) 0.2 cm 10/24/22 0848   Wound Surface Area (cm^2) 12.9 cm^2 10/24/22 0848   Change in Wound Size % (l*w) -115 10/24/22 0848   Wound Volume (cm^3) 2.58 cm^3 10/24/22 0848   Wound Assessment Eschar moist;Pink/red 10/24/22 0848   Drainage Amount Moderate 10/24/22 0848   Drainage Description Serosanguinous 10/24/22 0848   Odor Mild 10/24/22 0848   Joanna-wound Assessment Fragile;Blanchable erythema 10/24/22 0848   Wound Thickness Description not for Pressure Injury Full thickness 10/24/22 0848   Number of days: 51       Wound 09/13/22 Buttocks moisture skin damage (Active)   Number of days: 48        Initial WC visit 10/24/2022      Amount and/or Complexity of Data Reviewed and Analyzed:  I reviewed and analyzed all of the unique labs and radiologic studies that are shown below as well as any that are in the HPI, and any that are in the expanded problem list below  *Each unique test, order, or document contributes to the combination of 2 or combination of 3 in Category 1 below. I also independently reviewed radiology images for studies I described above in the HPI or studies I have ordered. For this visit I also reviewed old records and prior notes.     No results for input(s): WBC, HGB, PLT, NA, K, CL, CO2, BUN, CREA, GLU, INR, APTT, ALT, AML, AML, LCAD, PCO2, PO2, HCO3 in the last 72 hours. Invalid input(s): PTP, TBIL, TBILI, CBIL, SGOT, GPT, AP, LPSE, NH4, TROPT, TROIQ,  PH  No results for input(s): INR, APTT, ALT, AML in the last 72 hours. Invalid input(s): PTP, TBIL, TBILI, CBIL, SGOT, GPT, AP, LPSE    Most recent blood counts (CBC) review  Lab Results   Component Value Date    WBC 10.3 10/19/2022    HGB 9.3 (L) 10/28/2022    HCT 32.5 (L) 10/19/2022    MCV 99.7 10/19/2022     10/19/2022     Most recent chemistry (BMP) test review   Lab Results   Component Value Date/Time     10/19/2022 02:22 PM    K 4.0 10/19/2022 02:22 PM     10/19/2022 02:22 PM    CO2 29 10/19/2022 02:22 PM    BUN 15 10/19/2022 02:22 PM    CREATININE 0.80 10/28/2022 11:15 AM    CREATININE 1.20 10/19/2022 02:22 PM    GLUCOSE 156 10/19/2022 02:22 PM    CALCIUM 9.2 10/19/2022 02:22 PM      Most recent Liver enzyme test review  Lab Results   Component Value Date    ALT 78 (H) 10/19/2022    AST 28 10/19/2022    ALKPHOS 116 10/19/2022    BILITOT 0.5 10/19/2022     Most recent coagulation (coags) review  @lastinr@  Lab Results   Component Value Date/Time    INR 1.3 07/30/2022 09:53 AM    APTT 63.6 08/02/2022 05:41 AM    APTT 24.3 03/04/2022 03:57 PM       Diabetic assessment  Hemoglobin A1C   Date Value Ref Range Status   08/10/2022 6.0 (H) 4.8 - 5.6 % Final       Nutritional assessment screen to assess wound healing ability:  Lab Results   Component Value Date    LABALBU 3.2 10/19/2022     No results found for: TP, ALBR, ALB    Xray Result (most recent):  XR PELVIS (1-2 VIEWS) 10/18/2022    Narrative  Pelvis single view 10/18/2022    CLINICAL HISTORY: Fractured lower back. FINDINGS:  2 similar frontal views of the pelvis are submitted for evaluation. No abnormal  widening is seen of the sacroiliac joints or pubic symphysis.  No acute displaced  fracture is seen of the pelvic ring. Assessment of the lower lumbar spine and  proximal femurs is limited by the lack of lateral views. Kyphoplasty repair is  seen at the L4 vertebral body level with some vertebral body height loss  adjustment at this level. No clear acute changes of the bilateral hips are seen. Relatively advanced atherosclerotic changes are seen of regional arterial  structures. Impression  1. Kyphoplasty repair at L4. No acute displaced fracture is seen of the bony  pelvis. CT Result (most recent):  CT FNA WITH IMAGING GUIDED 09/14/2022    Narrative  PROCEDURE: CT-guided Large Bore Needle Aspiration and Core Biopsy. Procedural Personnel  Attending physician(s): Yesenia West M.D. Pre-procedure diagnosis: 67-year-old man with history of vertebral compression  fracture status post kyphoplasty 7/19/2022. Postoperative complications include  probable osteomyelitis and perivertebral abscess/psoas muscle abscess. CT-guided perivertebral fluid aspiration 8/1/2022 returned 2-3 cc of purulent  fluid that demonstrated high white blood cell count but no bacterial growth in  culture. Patient has completed 6 weeks of antibiotic therapy yet continues to  complain of low back pain and hip pain. Patient is currently in an antibiotic  holiday. Repeat MRI 9/6/2022 demonstrates possible small fluid collection  versus phlegmon adjacent to the L4 vertebral body or within the adjacent psoas  muscle. There is multilevel lumbar degenerative disc disease. Eliquis and  Plavix have been held in anticipation of fluid aspiration today. Today, the  patient complains only of hip pain. Post-procedure diagnosis: Same  Indication: Obtain specimen for microbiology evaluation  Previous biopsy of same target (QCDR): Yes    Complications: No immediate complications. Impression  CT-guided Needle aspiration and core biopsy of of the phlegmon  adjacent to the left side of L3-L4 disc and L4 vertebral body.   The 1st needle  placement adjacent to the L4 vertebral body returned no fluid, therefore a core  biopsy was obtained. The 2nd needle placement adjacent to the L3-L4 vertebral  body initially returned no fluid, and therefore a few cc of inoculum saline was  administered and then aspirated. Plan:  Specimen(s) sent to the Microbiology department for evaluation. One hour  bedrest.  Resume Plavix tomorrow. Resume Eliquis in 48 hours. _______________________________________________________________  Technique: All CT scans at this facility are performed using dose  reduction/dose modulation techniques, as appropriate to the performed exam,  including the following:  Automated Exposure Control; Adjustment of the MA  and/or kF according to patient size (this includes techniques or standardized  protocols for targeted exams where dose is matched to indication/reason for  exam); and Use of Iterative Reconstruction Technique. PROCEDURE SUMMARY:  - Percutaneous CT-guided Coaxial Core Needle Biopsy and Fluid Aspiration    PROCEDURE DETAILS:    Pre-procedure  Reference imaging for biopsy target: MRI 9/6/2022, 7/29/2022, 6/2/2022 as well  as CT 7/27/2022    Consent:  Informed written and oral consent for the procedure was obtained after  explanation of risks (including, but not limitted to:  hemorrhage, infection,  visceral injury, nondiagnostic biopsy) benefits and alternatives. The patient's  questions were answered to their satisfaction. They stated understanding and  requested that we proceed. Final verification:  A time-out identifying the patient and planned procedure  was performed prior to this procedure. Preparation: (MIPS) Maximal sterile barrier technique (including:  cap, mask,  sterile gown, sterile gloves, sterile sheet, hand hygiene, and cutaneous  antisepsis) was used.     Anesthesia/sedation  Level of anesthesia/sedation: Moderate sedation (conscious sedation)  Anesthesia/sedation administered by: Independent trained observer under  attending supervision with continuous monitoring of the patient?s level of  consciousness and physiologic status  Total intra-service sedation time (minutes): 27    Imaging prior to biopsy  The patient was positioned prone. Initial imaging was performed using  noncontrast CT. Biopsy target:  - Maximal diameter (cm): 0.7 -  Location: soft tissue density to the left of the L4 vertebral body. Other findings: Less distinct soft tissue density to the left of the L3-L4 disc  space. Biopsy  Local anesthesia was administered. Under CT guidance, the coaxial biopsy system  was advanced to the target and aspiration was attempted but no fluid was  returned. Therefore, through the coaxial needle, an 18-gauge core biopsy was  obtained and then placed into a sterile container. Coaxial needle: 17 gauge  Core needle biopsy device: Vobile  Core needle size: 18 gauge  Number of core specimens: 1    Attention was then turned to the subtle soft tissue density adjacent to the  L3-L4 disc. The coaxial needle was reassembled and then redirected using  intermittent CT scan imaging to this 2nd soft tissue density. The 17-gauge  coaxial needle was aspirated, returning no fluid. Therefore, 3 cc of inoculum  saline was administered and then aspirated as specimen. Large bore needle aspiration device: Vobile  Fine needle size: 17-gauge  Number of FNA specimens: 1    Needle removal  The biopsy needle was removed and a sterile dressing was applied. Tract embolization: None    Imaging following biopsy  Immediate post-biopsy imaging was not performed. Imaging modality: Not applicable.   Post-biopsy imaging findings: Not applicable    Contrast  Contrast agent: None  Contrast volume (mL): 0    Radiation Dose  CT dose length product (mGy-cm):  957.09    Additional Details  Additional description of procedure: None  Equipment details: None  Specimens removed: Microbiology  Estimated blood loss (mL): Less than 10  Standardized report: [email or web address]    Attestation  Signer name: Pina Hollis M.D. I attest that I personally performed the entire procedure. I reviewed the stored  images and agree with the report as written. 07/06/22    VAS DUP LOWER EXT ARTERIES BILATERAL W JOVANA 07/07/2022  7:57 AM (Final)    Interpretation Summary    Right lower extremity: The JOVAAN (ankle-brachial index) is 0.78 and is abnormal. The lower extremity arterial duplex reveals an occlusion of the proximal superficial femoral artery with reconstitution at the distal proximal superficial femoral artery. There is monophasic flow in the GILBERTO, PTA and peroneal arteries at the ankle. The great toe pressure is 64mmHg. Left lower extremity: Right lower extremity: The JOVANA (ankle-brachial index) is 0.60 and is abnormal. The lower extremity arterial duplex reveals an occlusion of the proximal superficial femoral artery with reconstitution at the below knee popliteal artery. There is monophasic flow in the GILBERTO, PTA and peroneal arteries at the ankle. The great toe pressure is 63mmHg. The abdominal aorta was evaluated and measured 2.8cm x 2.9cm at its maximum diameter, no aneurysm visualized on limited evaluation of abdominal aorta. Signed by: Sanam Steele MD on 7/7/2022  7:57 AM        Admission date (for inpatients): 10/31/2022   Day of Surgery  * No procedures listed *    Procedure Note  Indications: Based on my examination of this patient's wound(s)/ulcer(s) today, debridement is required to promote healing and evaluate the wound base. Debridement: Selective Debridement/Non-Excisional Debridement    Using: #15 surgical blade the wound(s)/ulcer(s) was/were debrided down through and including the removal of epidermis.   Performed by: Mary Beth Varela MD  Consent obtained: Yes  Time out taken: Yes  Pain Control:         Devitalized Tissue Debrided: slough and callus    Pre Debridement Measurements:  Are located in the Wound/Ulcer Documentation Flow Sheet    Diabetic/Pressure/Non Pressure Ulcers only:  Ulcer: Diabetic ulcer, fat layer exposed     Wound/Ulcer #: 1  Post Debridement Measurements:  Wound/Ulcer Descriptions are Pre Debridement except measurements:  Wound 09/09/22 Heel Left;Lateral (Active)   Wound Image   10/24/22 0848   Wound Etiology Diabetic Martinez 2 10/24/22 0848   Dressing Status Old drainage noted 10/24/22 0848   Wound Cleansed Cleansed with saline 10/24/22 0848   Dressing/Treatment Antibacterial ointment 10/24/22 0848   Offloading for Diabetic Foot Ulcers Other (comment) 10/24/22 0848   Wound Length (cm) 4.3 cm 10/24/22 0848   Wound Width (cm) 3 cm 10/24/22 0848   Wound Depth (cm) 0.2 cm 10/24/22 0848   Wound Surface Area (cm^2) 12.9 cm^2 10/24/22 0848   Change in Wound Size % (l*w) -115 10/24/22 0848   Wound Volume (cm^3) 2.58 cm^3 10/24/22 0848   Wound Assessment Eschar moist;Pink/red 10/24/22 0848   Drainage Amount Moderate 10/24/22 0848   Drainage Description Serosanguinous 10/24/22 0848   Odor Mild 10/24/22 0848   Joanna-wound Assessment Fragile;Blanchable erythema 10/24/22 0848   Wound Thickness Description not for Pressure Injury Full thickness 10/24/22 0848   Number of days: 51       Wound 09/13/22 Buttocks moisture skin damage (Active)   Number of days: 48     Incision 07/19/22 Back Medial (Active)   Number of days: 103       Incision 10/28/22 Groin Right (Active)   Dressing Status Clean;Dry; Intact 10/28/22 1641   Dressing/Treatment Pressure dressing 10/28/22 1552   Drainage Amount None 10/28/22 1641   Number of days: 2     Total Surface Area Debrided:  12.9 sq cm   Estimated Blood Loss: None. Hemostasis Achieved:  not needed  Procedural Pain: 0  / 10   Post Procedural Pain: 0 / 10   Response to treatment: Patient tolerated procedure well with no complaints of pain.       Assessment:      Problem List Items Addressed This Visit          Circulatory    Arterial degeneration    Relevant Orders    Initiate Outpatient Wound Care Protocol    PAD (peripheral artery disease) ([facility]    Relevant Orders    Initiate Outpatient Wound Care Protocol    DM (diabetes mellitus) type II, controlled, with peripheral vascular disorder [facility])    Relevant Orders    Initiate Outpatient Wound Care Protocol       Endocrine    Diabetic neuropathy ([facility] [address]    Relevant Orders    Initiate Outpatient Wound Care Protocol       Other    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 *  Resolved Problems:    * No resolved hospital problems.  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 ([facility] 10/24/2022     Priority: High    Antiplatelet or antithrombotic long-term use 10/24/2022     Priority: High     Eliquis and Plavix      Chest pain 12/05/2018     Priority: High    Lumbar discitis 09/08/2022     Priority: Medium    Psoas muscle abscess ([facility] 09/08/2022     Priority: Medium    Physical debility 08/26/2022     Priority: Medium    General weakness 07/27/2022     Priority: Medium    Acute cystitis without hematuria 07/27/2022     Priority: Medium    Low back pain without sciatica 07/27/2022     Priority: Medium    Back pain 07/27/2022     Priority: Medium    DNI (do not intubate) 07/27/2022     Priority: Medium    Elevated liver enzymes 07/27/2022     Priority: Medium    Anemia 07/27/2022     Priority: Medium    Hyponatremia 07/27/2022     Priority: Medium    ICD (implantable cardioverter-defibrillator) in place 06/16/2022     Priority: Medium    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 [address] 07/07/2022     Priority: Low     Added automatically from request for surgery [id number]      Atrial fibrillation ([facility] [address] 03/04/2022     Priority: Low    Ventricular tachycardia 03/04/2022     Priority: Low    VT (ventricular tachycardia) 03/04/2022     Priority: Low    Acute metabolic encephalopathy 24/55/0115     Priority: Low    Irregular heart beat 03/02/2021     Priority: Low    PVC's (premature ventricular contractions) 03/02/2021     Priority: Low    Elevated troponin 12/31/2020     Priority: Low    Toe laceration 12/09/2019     Priority: Low    Type 2 diabetes with nephropathy ([facility] 06/17/2019     Priority: Low    Hypoxia 02/08/2019     Priority: Low    Sepsis ([facility] 02/08/2019     Priority: Low    Abnormal nuclear cardiac imaging test 11/27/2018     Priority: Low    Cigarette nicotine dependence in remission 08/20/2018     Priority: Low    Chronic pain of right knee 12/14/2017     Priority: Low    Obstructive sleep apnea 08/11/2017     Priority: Low    Intermittent spinal claudication ([facility] 06/13/2017     Priority: Low    Pulmonary emphysema ([facility] 11/17/2016     Priority: Low    H/O endarterectomy 11/10/2015     Priority: Low    Complicated wound infection 11/10/2015     Priority: Low    Allergic rhinitis 11/10/2015     Priority: Low    Hiatal hernia 11/10/2015     Priority: Low    Diabetic neuropathy ([facility] 11/10/2015     Priority: Low    Osteoarthritis 11/10/2015     Priority: Low    Encounter for long-term (current) drug use 11/10/2015     Priority: Low    Benign neoplasm of colon 11/10/2015     Priority: Low    PAD (peripheral artery disease) ([facility] 11/10/2015     Priority: Low    Asthma 11/10/2015     Priority: Low    Orthostatic hypotension 11/10/2015     Priority: Low    Hyperlipidemia 11/10/2015     Priority: Low    S/P angioplasty with stent 11/10/2015     Priority: Low    COPD (chronic obstructive pulmonary disease) ([facility] [address] 04/10/2015     Priority: Low    Arterial degeneration 11/05/2014     Priority: Low     11/6/14 (Dr Mendel Curie) 1. Bleeding from right groin wound. 2. Disruption of right common femoral artery patch anastomosis and   possible arterial infection. Normocytic anemia 10/23/2014     Priority: Low    MINI (acute kidney injury) ([facility] [address] 09/15/2014     Priority: Low    DM (diabetes mellitus) type II, controlled, with peripheral vascular disorder ([facility] [address] 09/11/2014     Priority: Low    Atherosclerosis of native arteries of extremity with intermittent claudication [facility] 08/15/2014     Priority: Low     9/11/14 (Dr Mendel Curie) Bilateral common femoral endarterectomies. Personal history of tobacco use, presenting hazards to health 02/12/2013     Priority: Low    Asbestos exposure 02/12/2013     Priority: Low    HTN (hypertension) 10/12/2011     Priority: Low    Severe obesity (BMI 35.0-39. 9) with comorbidity ([facility] 10/12/2011     Priority: Low    Sleep apnea 10/12/2011     Priority: Low    Ischemic cardiomyopathy 10/12/2011     Priority: Low    Coronary artery disease involving native coronary artery of native heart without angina pectoris 10/12/2011     Priority: Low           Plan:     Number and Complexity of Problems addressed and   Risks of complications and/or morbidity of management    Treatment Note please see attached Discharge Instructions  Any procedures done today in the wound center (if documented in a separate note) in [facility] care are made part of this record by reference  Written patient dismissal instructions given to patient or POA.           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 He has had an ulcer on the left heel for several months. He has been debilitated following back surgery but is also debilitated from multiple medical problems including cardiac disease, peripheral vascular disease, and diabetes. He is anticoagulated on Eliquis and Plavix. He has had femoral endarterectomies in the past and is now followed by Dr. Charlene Campa. JOVANA in July was abnormal and has not been reevaluated since deterioration of his tissue. He is scheduled for repeat JOVANA in January and we will consult vascular surgery for earlier evaluation. Vascular consultation to follow as needed. He has not been offloading despite having work boots. There is clearly a pressure component. There is no mirror lesion on the right heel and may reflect his discrepancy in blood supply. He does describe some episodes that could be rest pain though it does not drop his legs for improvement. This may also be limited by his debility. We will not do debridement today. We will dress with Betadine and an absorbent pad and follow-up in 1 week. Offloading was emphasized. He returns on 10/31/2022. He underwent arteriography but is not a candidate for percutaneous intervention. He is being set up for formal bypass in November. Therapy is having him walk on his foot and offloading was again stressed. Current dressing remains Betadine. I will see him back in 1.5 weeks which will be before his bypass surgery.       Wound Care  Orders Placed This Encounter   Procedures    Initiate Outpatient Wound Care Protocol     Cleanse wound with saline    If wound contains bioburden or contamination cleanse with wound cleanser or antimicrobial solution     For normal periwound tissue without irritation nor maceration, apply topical skin protectant    For periwound tissue with irritation and/or maceration, apply zinc based product, topical steroid cream/ointment, or equivalent For wounds with dry firm black eschar and/or without exudate, apply betadine and leave open to air      For wounds with scant/small to no exudate or drainage, apply wound gel, hydrocolloid, polymer, or equivalent and cover with secondary dressing/foam      For wounds with moderate/large exudate or drainage, apply alginate, hydrofiber, polymer, or equivalent and cover with secondary dressing/foam    For wounds with nonviable tissue requiring removal, apply chemical or mechanical debrider and cover with secondary dressing/foam    For wounds with tunneling, dead space, or cavity, fill or pack with strip/gauze/kerlex to fit and cover with secondary dressing/foam    For wounds with adequate granulation or epithelization, apply wound gel, hydrocolloid, polymer, collagen, or transparent film, and cover secondary dry dressing/foam    For wounds that need additional secondary dressing to help pad or control additional drainage/exudates, add foam, absorbent pad or hydrocolloid    For wounds with suspected or known infection, apply antimicrobial mesh and/or antimicrobial alginate/hydrofiber, or antimicrobial solution moistened gauze/kerlex, or equivalent and cover with secondary dressing/foam    Compression Management needed for edema control, apply multilayer compression or tubular garment or equivalent    Offloading Management needed for pressure relief, apply offloading shoe/boot or equivalent     Standing Status:   Standing     Number of Occurrences:   1       Return Appointment:   1 week with Sherryle Canon, MD     Instructions:   Left Lateral Heel:  Cleanse with Normal Saline  Paint with Betadine Daily  Cover with Bandaid/Bordered Foam or ABD and wrap with Rolled Gauze. May leave open to air while reclining.      Float Heel When Patient in [facility]  When in [facility] [place] [facility] and When Heel is Unable to be Molecular Imaging     Vascular Consult: November 14, 2022  Vascular Surgery: November 19, 2022     Home [facility] for Wound Care and Assessment           Level of MDM (2/3 elements below)  Number and Complexity of Problems Addressed Amount and/or Complexity of Data to be Reviewed and Analyzed  *Each unique test, order, or document contributes to the combination of 2 or combination of 3 in Category 1 below. Risk of Complications and/or Morbidity or Mortality of pt Management     63752  40762 SF Minimal  ?1self-limited or minor problem Minimal or none Minimal risk of morbidity from additional diagnostic testing or Rx   46355  47215 Low Low  ? 2or more self-limited or minor problems;    or  ? 1stable chronic illness;    or  ?1acute, uncomplicated illness or injury   Limited  (Must meet the requirements of at least 1 of the 2 categories)  Category 1: Tests and documents   ? Any combination of 2 from the following:  ?Review of prior external note(s) from each unique source*;  ?review of the result(s) of each unique test*;   ?ordering of each unique test*    or   Category 2: Assessment requiring an independent historian(s)  (For the categories of independent interpretation of tests and discussion of management or test interpretation, see moderate or high) Low risk of morbidity from additional diagnostic testing or treatment     15280  86609 Mod Moderate  ? 1or more chronic illnesses with exacerbation, progression, or side effects of treatment;    or  ?2or more stable chronic illnesses;    or  ?1undiagnosed new problem with uncertain prognosis;    or  ?1acute illness with systemic symptoms;    or  ?1acute complicated injury   Moderate  (Must meet the requirements of at least 1 out of 3 categories)  Category 1: Tests, documents, or independent historian(s)  ? Any combination of 3 from the following:   ?Review of prior external note(s) from each unique source*;  ?Review of the result(s) of each unique test*;  ?Ordering of each unique test*;  ?Assessment requiring an independent historian(s)    or  Category 2: Independent interpretation of tests   ? Independent interpretation of a test performed by another physician/other qualified health care professional (not separately reported);     or  Category 3: Discussion of management or test interpretation  ? Discussion of management or test interpretation with external physician/other qualified health care professional/appropriate source (not separately reported)   Moderate risk of morbidity from additional diagnostic testing or treatment  Examples only:  ?Prescription drug management - advanced wound care prescription Rx wound care products  (eg Iodosorb, hydrofera, silvadene, collagen, puracol plus, optiloc, biologics - placenta, Theraskin, Apligraf). Note also that if home health care is involved, they will not apply topical therapies without a prescription/order from physician      ? Decision regarding minor surgery with identified patient or procedure risk factors  ? Decision regarding elective major surgery without identified patient or procedure risk factors   ? Diagnosis or treatment significantly limited by social determinants of health       65575  42313 High High  ? 1or more chronic illnesses with severe exacerbation, progression, or side effects of treatment;    or  ?1 acute or chronic illness or injury that poses a threat to life or bodily function   Extensive  (Must meet the requirements of at least 2 out of 3 categories)  Category 1: Tests, documents, or independent historian(s)  ? Any combination of 3 from the following:   ?Review of prior external note(s) from each unique source*;  ?Review of the result(s) of each unique test*;   ?Ordering of each unique test*;   ?Assessment requiring an independent historian(s)    or   Category 2: Independent interpretation of tests   ? Independent interpretation of a test performed by another physician/other qualified health care professional (not separately reported);     or  Category 3: Discussion of management or test interpretation  ? Discussion of management or test interpretation with external physician/other qualified health care professional/appropriate source (not separately reported)   High risk of morbidity from additional diagnostic testing or treatment  Examples only:  ?Drug therapy requiring intensive monitoring for toxicity  ? Decision regarding elective major surgery with identified patient or procedure risk factors  ? Decision regarding emergency major surgery  ? Decision regarding hospitalization  ? Decision not to resuscitate or to de-escalate care because of poor prognosis                 I have personally performed a face-to-face diagnostic evaluation and management  service on this patient. I have independently seen the patient. I have independently obtained the above history from the patient/family. I have independently examined the patient with above findings. I have independently reviewed data/labs for this patient and developed the above plan of care (MDM).       Electronically signed by Eden Zhu MD on 10/31/2022 at 12:52 PM

## 2022-11-01 ENCOUNTER — TELEPHONE (OUTPATIENT)
Dept: INTERNAL MEDICINE CLINIC | Facility: CLINIC | Age: 77
End: 2022-11-01

## 2022-11-01 RX ORDER — DOXYCYCLINE HYCLATE 100 MG
100 TABLET ORAL 2 TIMES DAILY
Qty: 14 TABLET | Refills: 0 | Status: SHIPPED | OUTPATIENT
Start: 2022-11-01 | End: 2022-11-08

## 2022-11-03 ENCOUNTER — OFFICE VISIT (OUTPATIENT)
Dept: INTERNAL MEDICINE CLINIC | Facility: CLINIC | Age: 77
End: 2022-11-03
Payer: MEDICARE

## 2022-11-03 VITALS
TEMPERATURE: 99 F | HEART RATE: 61 BPM | HEIGHT: 72 IN | WEIGHT: 224 LBS | RESPIRATION RATE: 18 BRPM | OXYGEN SATURATION: 96 % | DIASTOLIC BLOOD PRESSURE: 62 MMHG | SYSTOLIC BLOOD PRESSURE: 111 MMHG | BODY MASS INDEX: 30.34 KG/M2

## 2022-11-03 DIAGNOSIS — R53.81 PHYSICAL DEBILITY: ICD-10-CM

## 2022-11-03 DIAGNOSIS — L97.422 CHRONIC ULCER OF LEFT HEEL WITH FAT LAYER EXPOSED (HCC): Primary | ICD-10-CM

## 2022-11-03 DIAGNOSIS — E11.42 TYPE 2 DIABETES MELLITUS WITH POLYNEUROPATHY (HCC): ICD-10-CM

## 2022-11-03 DIAGNOSIS — I10 PRIMARY HYPERTENSION: ICD-10-CM

## 2022-11-03 DIAGNOSIS — I73.9 PAD (PERIPHERAL ARTERY DISEASE) (HCC): ICD-10-CM

## 2022-11-03 PROCEDURE — 3078F DIAST BP <80 MM HG: CPT | Performed by: NURSE PRACTITIONER

## 2022-11-03 PROCEDURE — 1123F ACP DISCUSS/DSCN MKR DOCD: CPT | Performed by: NURSE PRACTITIONER

## 2022-11-03 PROCEDURE — 3074F SYST BP LT 130 MM HG: CPT | Performed by: NURSE PRACTITIONER

## 2022-11-03 PROCEDURE — G8484 FLU IMMUNIZE NO ADMIN: HCPCS | Performed by: NURSE PRACTITIONER

## 2022-11-03 PROCEDURE — 3044F HG A1C LEVEL LT 7.0%: CPT | Performed by: NURSE PRACTITIONER

## 2022-11-03 PROCEDURE — 99214 OFFICE O/P EST MOD 30 MIN: CPT | Performed by: NURSE PRACTITIONER

## 2022-11-03 PROCEDURE — G8427 DOCREV CUR MEDS BY ELIG CLIN: HCPCS | Performed by: NURSE PRACTITIONER

## 2022-11-03 PROCEDURE — G8417 CALC BMI ABV UP PARAM F/U: HCPCS | Performed by: NURSE PRACTITIONER

## 2022-11-03 PROCEDURE — 4004F PT TOBACCO SCREEN RCVD TLK: CPT | Performed by: NURSE PRACTITIONER

## 2022-11-03 RX ORDER — CIPROFLOXACIN 500 MG/1
500 TABLET, FILM COATED ORAL 2 TIMES DAILY
Qty: 14 TABLET | Refills: 0 | Status: SHIPPED | OUTPATIENT
Start: 2022-11-03 | End: 2022-11-10

## 2022-11-03 RX ORDER — VALSARTAN 80 MG/1
80 TABLET ORAL DAILY
Qty: 30 TABLET | Refills: 5 | Status: SHIPPED | OUTPATIENT
Start: 2022-11-03

## 2022-11-03 SDOH — ECONOMIC STABILITY: FOOD INSECURITY: WITHIN THE PAST 12 MONTHS, YOU WORRIED THAT YOUR FOOD WOULD RUN OUT BEFORE YOU GOT MONEY TO BUY MORE.: PATIENT DECLINED

## 2022-11-03 SDOH — ECONOMIC STABILITY: FOOD INSECURITY: WITHIN THE PAST 12 MONTHS, THE FOOD YOU BOUGHT JUST DIDN'T LAST AND YOU DIDN'T HAVE MONEY TO GET MORE.: PATIENT DECLINED

## 2022-11-03 ASSESSMENT — PATIENT HEALTH QUESTIONNAIRE - PHQ9
2. FEELING DOWN, DEPRESSED OR HOPELESS: 0
SUM OF ALL RESPONSES TO PHQ9 QUESTIONS 1 & 2: 0
SUM OF ALL RESPONSES TO PHQ QUESTIONS 1-9: 0
1. LITTLE INTEREST OR PLEASURE IN DOING THINGS: 0

## 2022-11-03 ASSESSMENT — ENCOUNTER SYMPTOMS
WHEEZING: 1
VOMITING: 0
COLOR CHANGE: 1
SHORTNESS OF BREATH: 0
NAUSEA: 0

## 2022-11-03 ASSESSMENT — SOCIAL DETERMINANTS OF HEALTH (SDOH): HOW HARD IS IT FOR YOU TO PAY FOR THE VERY BASICS LIKE FOOD, HOUSING, MEDICAL CARE, AND HEATING?: PATIENT DECLINED

## 2022-11-03 NOTE — PROGRESS NOTES
11/3/2022 2:01 PM  Location:[facility] [address] [facility]  [place]  Patient #:  [id number]  YOB: 1945          YOUR LAST HEMOGLOBIN A1CS:   No results found for: HBA1C, [id number]    YOUR LAST LIPID PROFILE:   Lab Results   Component Value Date/Time    CHOL 77 03/05/2022 06:14 AM    HDL 45 03/05/2022 06:14 AM    VLDL 18 09/21/2021 02:54 PM         Lab Results   Component Value Date/Time    GFRAA >60 09/16/2022 04:42 AM    BUN 15 10/19/2022 02:22 PM     10/19/2022 02:22 PM    K 4.0 10/19/2022 02:22 PM     10/19/2022 02:22 PM    CO2 29 10/19/2022 02:22 PM           History of Present Illness     Chief Complaint   Patient presents with    Follow-up     wound       Mr. Bree Chairez is a 65 y.o. male  who presents for wound follow up, blood pressure follow up. Mr. Trista Esparza presents with his wife for reevaluation of his left heel wound and blood pressure recheck. He was seen on the 19th and had developed some skin breakdown of his left heel, was referred to wound center who sent him for a vascular evaluation. Patient with remote history of bilateral common femoral endarterectomies complicated by right femoral infection and sartorius flap. Noninvasive imaging showed bilateral SFA occlusions with JOVANA 0.62 on the left, and toe pressures of 22mmHg. Left lower extremity angiogram unsuccessful and  and requirement of upcoming lower extremity bypass, which will be done on 11/19. Bong Guzman His wound culture did grow Proteus vulgaris, Pseudomonas and Klebsiella pneumonia, all responsive to ciprofloxacin. He was placed on doxycycline and never started on Cipro. Wound center has been following him and his wife reports that his wound is healing. The odor has improved and she is washing it daily with Betadine and dressing it per wound care's instructions. He has follow-up with wound center next week, upcoming surgery with vascular, cardiology follow-up and pulmonology follow-up.   He is not running fevers or having chills and his appetite is good. He denies any leg pain but has been unable to walk due to his current wound and vascular insufficiency. He is taking Eliquis and Plavix. He never decreased his amiodarone dosing down to once daily. His blood pressure is better on the decreased dose of valsartan.          Allergies   Allergen Reactions    Acetaminophen Hives     Per spouse has small amount of hives, takes 1/2 and tolerates     Azithromycin Hives    Morphine Hallucinations     Muscle twitching. jerking    Oxaprozin Other (See Comments)     ELEVATED BLOOD PRESSURE    Oxycodone Anxiety     Past Medical History:   Diagnosis Date    Acute respiratory failure with hypoxia ([address] 10/23/2014    MINI (acute kidney injury) ([address] 09/15/2014    MINI (acute kidney injury) ([address]     MINI (acute kidney injury) ([address]     Allergic rhinitis 11/10/2015    Asthma 11/10/2015    Benign essential hypertension 11/10/2015    Benign neoplasm of colon 11/10/2015    CAD (coronary artery disease) 11/10/2015    CAD (coronary artery disease) 1998, 1999    mix2, 3 stents [id number]    CAP (community acquired pneumonia) 12/31/2020    Cardiomyopathy ([address] 61/84/8262    Complicated wound infection 11/10/2015    COPD (chronic obstructive pulmonary disease) with emphysema ([address] 11/10/2015    COPD exacerbation ([address] 10/12/2011    COVID-19 12/31/2020    Diabetes mellitus type 2, controlled ([address] 11/10/2015    doesn/t check daily oral meds, A1c 6.0 (8/10/22)    Diabetic neuropathy ([address] 11/10/2015    Disruption of wound, unspecified 10/09/2014    Encounter for long-term (current) use of other high-risk medications 11/10/2015    Extremity atherosclerosis with intermittent claudication ([address] 11/10/2015    H/O endarterectomy 11/10/2015    Hiatal hernia 11/10/2015    History of 2019 novel coronavirus disease (COVID-19)     12/31/2020- hospitalized    Hyperglycemia due to type 1 diabetes mellitus ([address] 11/10/2015    Hyperlipidemia 11/10/2015    ICD (implantable cardioverter-defibrillator) in place 2022    Monomorphic ventricular tachycardia 2020    Myocardial infarction [facility]) 1999    Myocardial infarction ([facility] 11/10/2015    Obesity 11/10/2015    Orthostatic hypotension 11/10/2015    Osteoarthritis 11/10/2015    Peripheral vascular disease ([facility] 11/10/2015    PNA (pneumonia) 2019    PVC (premature ventricular contraction)     Rash     Seroma complicating a procedure 10/02/2014    Sleep apnea     cpap    Toe laceration     Type 2 diabetes with nephropathy ([facility]     Uncontrolled type 2 diabetes mellitus 11/10/2015    Vertiginous syndromes and other disorders of vestibular system 11/10/2015     Social History     Socioeconomic History    Marital status:      Spouse name: None    Number of children: None    Years of education: None    Highest education level: None   Tobacco Use    Smoking status: Former     Packs/day: 1.00     Years: 50.00     Pack years: 50.00     Types: Cigarettes     Quit date: 10/2/2011     Years since quittin.0    Smokeless tobacco: Current     Types: Chew    Tobacco comments:     Chews tobacco daily   Vaping Use    Vaping Use: Never used   Substance and Sexual Activity    Alcohol use: Yes     Alcohol/week: 0.0 standard drinks    Drug use: No   Social History Narrative    Lives with wife     Social Determinants of Health     Financial Resource Strain: Unknown    Difficulty of Paying Living Expenses: Patient refused   Food Insecurity: Unknown    Worried About Running Out of Food in the Last Year: Patient refused    Ran Out of Food in the Last Year: Patient refused     Past Surgical History:   Procedure Laterality Date    CARDIAC CATHETERIZATION  2018    LV EF=40%. LM:nl. LAD:30-40% mid stenosis. LCX OM1:95% stenosis. RCA:100% occlusion at RV branch.     CATARACT REMOVAL Right 2022    CORONARY ANGIOPLASTY WITH STENT PLACEMENT  2018    LCX OM1:2.5 x 12 [place] RAKESH    CORONARY ANGIOPLASTY WITH STENT PLACEMENT  1999    MA ABDOMEN SURGERY [address]    abdominal cyst removed    MA CARDIAC SURG PROCEDURE UNLIST  1999    stents x3    SPINE SURGERY N/A 07/19/2022    LUMBAR 2, LUMBAR 4 KYPHOPLASTY AND ANY OTHER INDICATED LEVELS performed by Ivonne Mcgee III, MD at [address] [place] [address]  11/5/14    Repair of right common femoral artery     VASCULAR SURGERY  9/11/14    tiffanie femoral endarterectomy    VASCULAR SURGERY Left 10/28/2022    LEFT LOWER EXTREMITY ARTERIOGRAM / ULTRASOUND GUIDED ACCESS   performed by Loreta Arthur MD at [facility] MAIN OR     Current Outpatient Medications   Medication Sig Dispense Refill    doxycycline hyclate (VIBRA-TABS) 100 MG tablet Take 1 tablet by mouth 2 times daily for 7 days 14 tablet 0    CPAP Machine MISC by Does not apply route      albuterol sulfate HFA (PROVENTIL;VENTOLIN;PROAIR) 108 (90 Base) MCG/ACT inhaler USE 2 PUFFS BY INHALATION 4 TIMES DAILY AS NEEDED FOR WHEEZING OR SHORTNESS OF BREATH. Patient prefers ventolin. 18 g 11    GUAIFENESIN 1200 PO Take 1 tablet by mouth every 12 hours as needed      tamsulosin (FLOMAX) 0.4 MG capsule Take 0.4 mg by mouth daily      traMADol (ULTRAM) 50 MG tablet Take 50 mg by mouth every 6 hours as needed for Pain. Taking 1/2 every morning      acetaminophen (TYLENOL) 500 MG tablet Take 500 mg by mouth every 6 hours as needed for Pain Taking 1/2 two times daily      valsartan (DIOVAN) 80 MG tablet Take 0.5 tablets by mouth daily (Patient taking differently: Take 80 mg by mouth daily) 30 tablet 5    glipiZIDE (GLUCOTROL XL) 10 MG extended release tablet Take 1 tablet by mouth daily 30 tablet 0    furosemide (LASIX) 20 MG tablet Take 1 tablet by mouth in the morning.  (Patient taking differently: Take 20 mg by mouth daily 1/2 tablet) 60 tablet 3    rosuvastatin (CRESTOR) 10 MG tablet TAKE 1 TABLET BY MOUTH EVERY DAY (Patient taking differently: at bedtime) 90 tablet 0    fluticasone-salmeterol (ADVAIR) 250-50 MCG/ACT AEPB diskus inhaler Inhale 1 puff into the lungs in the morning and at bedtime      amiodarone (CORDARONE) 200 MG tablet Take 200 mg by mouth daily      apixaban (ELIQUIS) 5 MG TABS tablet Take 5 mg by mouth every 12 hours      ascorbic acid (VITAMIN C) 500 MG tablet Take 500 mg by mouth      carvedilol (COREG) 25 MG tablet Take 25 mg by mouth 2 times daily (with meals)      Cholecalciferol 50 MCG (2000 UT) TABS Take 2,000 Units by mouth      clopidogrel (PLAVIX) 75 MG tablet Take 75 mg by mouth daily      fluticasone (FLONASE) 50 MCG/ACT nasal spray USE 1 OR 2 SPRAYS IN EACH NOSTRIL EVERY DAY. latanoprost (XALATAN) 0.005 % ophthalmic solution Apply 1 drop to eye nightly      magnesium oxide (MAG-OX) 400 (240 Mg) MG tablet TAKE 1 TABLET BY MOUTH EVERY DAY      montelukast (SINGULAIR) 10 MG tablet TAKE 1 TABLET BY MOUTH EVERY DAY AT BEDTIME      nitroGLYCERIN (NITROSTAT) 0.4 MG SL tablet Place 0.4 mg under the tongue      polyethylene glycol (GLYCOLAX) 17 GM/SCOOP powder Take 17 g by mouth daily       No current facility-administered medications for this visit.      Health Maintenance   Topic Date Due    DTaP/Tdap/Td vaccine (1 - Tdap) Never done    Low dose CT lung screening  Never done    Pneumococcal 65+ years Vaccine (3 - PPSV23 if available, else PCV20) 10/17/2016    COVID-19 Vaccine (3 - Booster for Moderna series) 06/10/2021    Diabetic retinal exam  05/18/2022    Flu vaccine (1) 08/01/2022    Diabetic foot exam  09/21/2022    A1C test (Diabetic or Prediabetic)  02/10/2023    Lipids  03/05/2023    Annual Wellness Visit (AWV)  03/24/2023    Prostate Specific Antigen (PSA) Screening or Monitoring  05/18/2023    Depression Screen  10/04/2023    Shingles vaccine  Completed    Hepatitis C screen  Completed    Hepatitis A vaccine  Aged Out    Hib vaccine  Aged Out    Meningococcal (ACWY) vaccine  Aged Out     Family History   Problem Relation Age of Onset    Breast Cancer Mother Hypertension Mother     Other Father         DIVERTICULITIS    Crohn's Disease Sister     Lung Disease Brother         mesothioloma    Diabetes Sister     Heart Attack Father     Heart Disease Father     Stroke Mother              Review of Systems  Review of Systems   Constitutional:  Negative for chills and fever. Respiratory:  Positive for wheezing. Negative for shortness of breath. Cardiovascular:  Negative for chest pain, palpitations and leg swelling. Gastrointestinal:  Negative for nausea and vomiting. Skin:  Positive for color change and wound. All other systems reviewed and are negative. /62 (Site: Right Upper Arm, Position: Sitting, Cuff Size: Large Adult)   Pulse 61   Temp 99 °F (37.2 °C) (Temporal)   Resp 18   Ht 6' (1.829 m)   Wt 224 lb (101.6 kg)   SpO2 96% Comment: ra  BMI 30.38 kg/m²       Physical Exam    Physical Exam  Vitals and nursing note reviewed. Constitutional:       General: He is not in acute distress. Appearance: He is not ill-appearing (looks improved, in wheelchair), toxic-appearing or diaphoretic. Cardiovascular:      Rate and Rhythm: Regular rhythm. Heart sounds: No murmur heard. Pulmonary:      Effort: No respiratory distress. Breath sounds: No stridor. Wheezing (scattered) present. No rales. Musculoskeletal:      Right lower leg: No edema. Left lower leg: No edema. Feet:    Feet:      Left foot:      Skin integrity: Ulcer and skin breakdown present. No warmth or dry skin. Comments: See image- ulcerated heel with fat layer exposed, left foot cool. Neurological:      Mental Status: He is oriented to person, place, and time. Assessment & Plan     Diagnosis Orders   1. Chronic ulcer of left heel with fat layer exposed ([address]        2. PAD (peripheral artery disease) ([address]        3. Type 2 diabetes mellitus with polyneuropathy ([address]        4. Physical debility        5.  Primary hypertension             Current Outpatient Medications   Medication Sig Dispense Refill    doxycycline hyclate (VIBRA-TABS) 100 MG tablet Take 1 tablet by mouth 2 times daily for 7 days 14 tablet 0    CPAP Machine MISC by Does not apply route      albuterol sulfate HFA (PROVENTIL;VENTOLIN;PROAIR) 108 (90 Base) MCG/ACT inhaler USE 2 PUFFS BY INHALATION 4 TIMES DAILY AS NEEDED FOR WHEEZING OR SHORTNESS OF BREATH. Patient prefers ventolin. 18 g 11    GUAIFENESIN 1200 PO Take 1 tablet by mouth every 12 hours as needed      tamsulosin (FLOMAX) 0.4 MG capsule Take 0.4 mg by mouth daily      traMADol (ULTRAM) 50 MG tablet Take 50 mg by mouth every 6 hours as needed for Pain. Taking 1/2 every morning      acetaminophen (TYLENOL) 500 MG tablet Take 500 mg by mouth every 6 hours as needed for Pain Taking 1/2 two times daily      valsartan (DIOVAN) 80 MG tablet Take 0.5 tablets by mouth daily (Patient taking differently: Take 80 mg by mouth daily) 30 tablet 5    glipiZIDE (GLUCOTROL XL) 10 MG extended release tablet Take 1 tablet by mouth daily 30 tablet 0    furosemide (LASIX) 20 MG tablet Take 1 tablet by mouth in the morning.  (Patient taking differently: Take 20 mg by mouth daily 1/2 tablet) 60 tablet 3    rosuvastatin (CRESTOR) 10 MG tablet TAKE 1 TABLET BY MOUTH EVERY DAY (Patient taking differently: at bedtime) 90 tablet 0    fluticasone-salmeterol (ADVAIR) 250-50 MCG/ACT AEPB diskus inhaler Inhale 1 puff into the lungs in the morning and at bedtime      amiodarone (CORDARONE) 200 MG tablet Take 200 mg by mouth daily      apixaban (ELIQUIS) 5 MG TABS tablet Take 5 mg by mouth every 12 hours      ascorbic acid (VITAMIN C) 500 MG tablet Take 500 mg by mouth      carvedilol (COREG) 25 MG tablet Take 25 mg by mouth 2 times daily (with meals)      Cholecalciferol 50 MCG (2000 UT) TABS Take 2,000 Units by mouth      clopidogrel (PLAVIX) 75 MG tablet Take 75 mg by mouth daily      fluticasone (FLONASE) 50 MCG/ACT nasal spray USE 1 OR 2 SPRAYS IN EACH NOSTRIL EVERY DAY. latanoprost (XALATAN) 0.005 % ophthalmic solution Apply 1 drop to eye nightly      magnesium oxide (MAG-OX) 400 (240 Mg) MG tablet TAKE 1 TABLET BY MOUTH EVERY DAY      montelukast (SINGULAIR) 10 MG tablet TAKE 1 TABLET BY MOUTH EVERY DAY AT BEDTIME      nitroGLYCERIN (NITROSTAT) 0.4 MG SL tablet Place 0.4 mg under the tongue      polyethylene glycol (GLYCOLAX) 17 GM/SCOOP powder Take 17 g by mouth daily       No current facility-administered medications for this visit. No orders of the defined types were placed in this encounter. Medications Discontinued During This Encounter   Medication Reason    pantoprazole (PROTONIX) 40 MG tablet LIST CLEANUP    tiZANidine (ZANAFLEX) 2 MG tablet LIST CLEANUP       1. Chronic ulcer of left heel with fat layer exposed ([facility]  Reviewed recent wound culture, add Cipro for expanded coverage. He is taking a probiotic and has tolerated before without difficulty. We will follow-up with wound center next week. Knows to call for any new or worsening symptoms. - ciprofloxacin (CIPRO) 500 MG tablet; Take 1 tablet by mouth 2 times daily for 7 days  Dispense: 14 tablet; Refill: 0    2. PAD (peripheral artery disease) ([facility]  With upcoming lower extremity bypass planned by vascular, no increased pain, no change in foot discoloration. 3. Type 2 diabetes mellitus with polyneuropathy ([facility]  Controlled on current medication, will continue. 4. Physical debility  Improving slowly, may need physical therapy, will maintain close follow-up. 5. Primary hypertension  Blood pressure is improved on decreased dose of valsartan, will continue.  - valsartan (DIOVAN) 80 MG tablet; Take 1 tablet by mouth daily  Dispense: 30 tablet; Refill: 5    Will maintain close follow-up in 1 month, did have an elevated ALT and amiodarone dosing was never decreased. His wife will decrease amiodarone dosing and we will plan to recheck LFTs next month.   Knows to call the office for any new or worsening symptoms. Appears improved today.     Enedina Headley NP, APRN - CNP

## 2022-11-10 ENCOUNTER — HOSPITAL ENCOUNTER (OUTPATIENT)
Dept: WOUND CARE | Age: 77
Discharge: HOME | End: 2022-11-10
Payer: MEDICARE

## 2022-11-10 VITALS
DIASTOLIC BLOOD PRESSURE: 76 MMHG | HEART RATE: 61 BPM | TEMPERATURE: 97.6 F | SYSTOLIC BLOOD PRESSURE: 152 MMHG | WEIGHT: 224 LBS | BODY MASS INDEX: 30.34 KG/M2 | HEIGHT: 72 IN

## 2022-11-10 DIAGNOSIS — Z98.890 H/O ENDARTERECTOMY: ICD-10-CM

## 2022-11-10 DIAGNOSIS — E11.42 DIABETIC POLYNEUROPATHY ASSOCIATED WITH TYPE 2 DIABETES MELLITUS (HCC): ICD-10-CM

## 2022-11-10 DIAGNOSIS — L97.422 NON-PRESSURE CHRONIC ULCER OF LEFT HEEL AND MIDFOOT WITH FAT LAYER EXPOSED (HCC): Primary | ICD-10-CM

## 2022-11-10 DIAGNOSIS — E11.51 DM (DIABETES MELLITUS) TYPE II, CONTROLLED, WITH PERIPHERAL VASCULAR DISORDER (HCC): ICD-10-CM

## 2022-11-10 DIAGNOSIS — I70.90: ICD-10-CM

## 2022-11-10 DIAGNOSIS — I73.9 PAD (PERIPHERAL ARTERY DISEASE) (HCC): ICD-10-CM

## 2022-11-10 DIAGNOSIS — Z79.02 ANTIPLATELET OR ANTITHROMBOTIC LONG-TERM USE: ICD-10-CM

## 2022-11-10 DIAGNOSIS — R53.81 PHYSICAL DEBILITY: ICD-10-CM

## 2022-11-10 PROCEDURE — 99214 OFFICE O/P EST MOD 30 MIN: CPT | Performed by: SURGERY

## 2022-11-10 PROCEDURE — 99214 OFFICE O/P EST MOD 30 MIN: CPT

## 2022-11-10 RX ORDER — GINSENG 100 MG
CAPSULE ORAL ONCE
OUTPATIENT
Start: 2022-11-10 | End: 2022-11-10

## 2022-11-10 RX ORDER — GENTAMICIN SULFATE 1 MG/G
OINTMENT TOPICAL ONCE
OUTPATIENT
Start: 2022-11-10 | End: 2022-11-10

## 2022-11-10 RX ORDER — LIDOCAINE 40 MG/G
CREAM TOPICAL ONCE
OUTPATIENT
Start: 2022-11-10 | End: 2022-11-10

## 2022-11-10 RX ORDER — CLOBETASOL PROPIONATE 0.5 MG/G
OINTMENT TOPICAL ONCE
OUTPATIENT
Start: 2022-11-10 | End: 2022-11-10

## 2022-11-10 RX ORDER — BETAMETHASONE DIPROPIONATE 0.05 %
OINTMENT (GRAM) TOPICAL ONCE
OUTPATIENT
Start: 2022-11-10 | End: 2022-11-10

## 2022-11-10 RX ORDER — BACITRACIN ZINC AND POLYMYXIN B SULFATE 500; 1000 [USP'U]/G; [USP'U]/G
OINTMENT TOPICAL ONCE
OUTPATIENT
Start: 2022-11-10 | End: 2022-11-10

## 2022-11-10 RX ORDER — LIDOCAINE 50 MG/G
OINTMENT TOPICAL ONCE
OUTPATIENT
Start: 2022-11-10 | End: 2022-11-10

## 2022-11-10 RX ORDER — BACITRACIN, NEOMYCIN, POLYMYXIN B 400; 3.5; 5 [USP'U]/G; MG/G; [USP'U]/G
OINTMENT TOPICAL ONCE
OUTPATIENT
Start: 2022-11-10 | End: 2022-11-10

## 2022-11-10 RX ORDER — LIDOCAINE HYDROCHLORIDE 40 MG/ML
SOLUTION TOPICAL ONCE
OUTPATIENT
Start: 2022-11-10 | End: 2022-11-10

## 2022-11-10 RX ORDER — LIDOCAINE HYDROCHLORIDE 20 MG/ML
JELLY TOPICAL ONCE
OUTPATIENT
Start: 2022-11-10 | End: 2022-11-10

## 2022-11-10 NOTE — PROGRESS NOTES
[address]  Consult Note/H&P/Progress Note      [address] RECORD NUMBER:  [id number]  AGE: 65 y.o. RACE White (non-)  GENDER: male  : 1945  EPISODE DATE:  11/10/2022       Subjective:      CC (Chief Complaint) and HISTORY of PRESENT ILLNESS (HPI):    Marcus Spaulding is a 65 y.o. male who presents today for wound/ulcer evaluation. He presents on 10/24/2022 with an ulcer wound on the left heel that has been present for several months. He was in his usual state of poor health when in July he had back surgery which led to a complicated abscess, prolonged hospital stay, and rehabilitation stay which ended just a few weeks ago. He is currently at home. He has significant debility. He is diabetic and has significant peripheral vascular disease with history of bilateral femoral endarterectomies. These were performed several years ago by Dr. Serenity Londono. ABIs were performed in July just prior to his surgery with the JOVANA on the left of 0.6 with plan for follow-up this coming January to monitor. He has been lying in bed without use of Ro[place] boots and has developed an ulcer on the left heel. He has a history of MRSA infection in his back and was placed on a course of doxycycline. There is no evidence of active cellulitis today. He has had no imaging. He has not had repeat vascular evaluation. There is some description of pain in his leg at rest.  He is not active enough to discern any possible claudication. He is anticoagulated on both Eliquis and Plavix. There is no current dressing care plan. He returns on 10/31/2022. We are very fortunate that he was evaluated by vascular surgery who performed an arteriogram showing significant arterial disease. Unfortunately, his disease is not amenable to percutaneous intervention and will require a bypass in the coming weeks.   Debridement was not aggressively performed today but some loose tissue was selectively debrided and well-tolerated. He has been participating with home therapy including ambulation with a walker. Current dressing is Betadine. He returns on 11/10/2022. He is doing better with no evidence of cellulitis. He has completed antibiotics. The ulcer looks improved with current dressing of Betadine. He sees vascular on Monday to schedule bypass.       This information was obtained from the patient  The following HPI elements were documented for the patient's wound:  Location: Left heel  Duration: August/September 2022  Severity: Fat layer exposed  Context: Limited activity, much lying in bed, PVD, DM, no offloading  Modifying Factors:  Nothing makes better or worse  Quality: Full-thickness, painful  Timing: Constant  Associated Signs and Symptoms: Tissue loss and possible bout of cellulitis      Ulcer Identification:  Ulcer Type: Diabetic foot ulcer left heel with fat layer exposed  Contributing Factors: Inadequate offloading, PVD, anticoagulated          PAST MEDICAL HISTORY  Past Medical History:   Diagnosis Date    Acute respiratory failure with hypoxia ([address] 10/23/2014    MINI (acute kidney injury) ([address] 09/15/2014    MINI (acute kidney injury) ([address]     MINI (acute kidney injury) ([address]     Allergic rhinitis 11/10/2015    Asthma 11/10/2015    Benign essential hypertension 11/10/2015    Benign neoplasm of colon 11/10/2015    CAD (coronary artery disease) 11/10/2015    CAD (coronary artery disease) 1998, 1999    mix2, 3 stents [id number]    CAP (community acquired pneumonia) 12/31/2020    Cardiomyopathy ([address] 21/84/7923    Complicated wound infection 11/10/2015    COPD (chronic obstructive pulmonary disease) with emphysema ([address] 11/10/2015    COPD exacerbation ([address] 10/12/2011    COVID-19 12/31/2020    Diabetes mellitus type 2, controlled ([address] 11/10/2015    doesn/t check daily oral meds, A1c 6.0 (8/10/22)    Diabetic neuropathy ([address] 11/10/2015    Disruption of wound, unspecified 10/09/2014    Encounter for long-term (current) use of other high-risk medications 11/10/2015    Extremity atherosclerosis with intermittent claudication ([address] 11/10/2015    H/O endarterectomy 11/10/2015    Hiatal hernia 11/10/2015    History of 2019 novel coronavirus disease (COVID-19)     12/31/2020- hospitalized    Hyperglycemia due to type 1 diabetes mellitus ([address] 11/10/2015    Hyperlipidemia 11/10/2015    ICD (implantable cardioverter-defibrillator) in place 06/16/2022    Monomorphic ventricular tachycardia 12/31/2020    Myocardial infarction [facility]) 1999    Myocardial infarction ([address] 11/10/2015    Obesity 11/10/2015    Orthostatic hypotension 11/10/2015    Osteoarthritis 11/10/2015    Peripheral vascular disease ([address] 11/10/2015    PNA (pneumonia) 02/08/2019    PVC (premature ventricular contraction)     Rash 17/03/3073    Seroma complicating a procedure 10/02/2014    Sleep apnea     cpap    Toe laceration     Type 2 diabetes with nephropathy ([address]     Uncontrolled type 2 diabetes mellitus 11/10/2015    Vertiginous syndromes and other disorders of vestibular system 11/10/2015        PAST SURGICAL HISTORY  Past Surgical History:   Procedure Laterality Date    CARDIAC CATHETERIZATION  12/05/2018    LV EF=40%. LM:nl. LAD:30-40% mid stenosis. LCX OM1:95% stenosis. RCA:100% occlusion at RV branch.     CATARACT REMOVAL Right 07/20/2022    CORONARY ANGIOPLASTY WITH STENT PLACEMENT  12/05/2018    LCX OM1:2.5 x 12 [place] RAKESH    CORONARY ANGIOPLASTY WITH STENT PLACEMENT  1999    MN ABDOMEN SURGERY PROC UNLISTED  1960    abdominal cyst removed    MN CARDIAC SURG PROCEDURE UNLIST  1999    stents x3    SPINE SURGERY N/A 07/19/2022    LUMBAR 2, LUMBAR 4 KYPHOPLASTY AND ANY OTHER INDICATED LEVELS performed by Siria Mojica MD at [address] [place] [address]  11/5/14    Repair of right common femoral artery     VASCULAR SURGERY  9/11/14    tiffanie femoral endarterectomy    VASCULAR SURGERY Left 10/28/2022    LEFT LOWER EXTREMITY ARTERIOGRAM / ULTRASOUND GUIDED ACCESS   performed by Mauri Wheeler MD at [facility] MAIN OR       FAMILY HISTORY  Family History   Problem Relation Age of Onset    Breast Cancer Mother     Hypertension Mother     Other Father         DIVERTICULITIS    Crohn's Disease Sister     Lung Disease Brother         mesothioloma    Diabetes Sister     Heart Attack Father     Heart Disease Father     Stroke Mother        SOCIAL HISTORY  Social History     Tobacco Use    Smoking status: Former     Packs/day: 1.00     Years: 50.00     Pack years: 50.00     Types: Cigarettes     Quit date: 10/2/2011     Years since quittin.1    Smokeless tobacco: Current     Types: Chew    Tobacco comments:     Chews tobacco daily   Vaping Use    Vaping Use: Never used   Substance Use Topics    Alcohol use: Yes     Alcohol/week: 0.0 standard drinks    Drug use: No       ALLERGIES  Allergies   Allergen Reactions    Acetaminophen Hives     Per spouse has small amount of hives, takes 1/2 and tolerates     Azithromycin Hives    Morphine Hallucinations     Muscle twitching. jerking    Oxaprozin Other (See Comments)     ELEVATED BLOOD PRESSURE    Oxycodone Anxiety       MEDICATIONS  Current Outpatient Medications on File Prior to Encounter   Medication Sig Dispense Refill    valsartan (DIOVAN) 80 MG tablet Take 1 tablet by mouth daily 30 tablet 5    ciprofloxacin (CIPRO) 500 MG tablet Take 1 tablet by mouth 2 times daily for 7 days 14 tablet 0    CPAP Machine MISC by Does not apply route      albuterol sulfate HFA (PROVENTIL;VENTOLIN;PROAIR) 108 (90 Base) MCG/ACT inhaler USE 2 PUFFS BY INHALATION 4 TIMES DAILY AS NEEDED FOR WHEEZING OR SHORTNESS OF BREATH. Patient prefers ventolin. 18 g 11    GUAIFENESIN 1200 PO Take 1 tablet by mouth every 12 hours as needed      tamsulosin (FLOMAX) 0.4 MG capsule Take 0.4 mg by mouth daily      traMADol (ULTRAM) 50 MG tablet Take 50 mg by mouth every 6 hours as needed for Pain.  Taking 1/2 every morning      acetaminophen (TYLENOL) 500 MG tablet Take 500 mg by mouth every 6 hours as needed for Pain Taking 1/2 two times daily      glipiZIDE (GLUCOTROL XL) 10 MG extended release tablet Take 1 tablet by mouth daily 30 tablet 0    furosemide (LASIX) 20 MG tablet Take 1 tablet by mouth in the morning. (Patient taking differently: Take 20 mg by mouth daily 1/2 tablet) 60 tablet 3    [DISCONTINUED] amLODIPine (NORVASC) 5 MG tablet Take 1 tablet by mouth in the morning. 30 tablet 3    [DISCONTINUED] QUEtiapine (SEROQUEL) 25 MG tablet Take 1 tablet by mouth in the morning. 60 tablet 3    rosuvastatin (CRESTOR) 10 MG tablet TAKE 1 TABLET BY MOUTH EVERY DAY (Patient taking differently: at bedtime) 90 tablet 0    fluticasone-salmeterol (ADVAIR) 250-50 MCG/ACT AEPB diskus inhaler Inhale 1 puff into the lungs in the morning and at bedtime      amiodarone (CORDARONE) 200 MG tablet Take 200 mg by mouth daily      apixaban (ELIQUIS) 5 MG TABS tablet Take 5 mg by mouth every 12 hours      ascorbic acid (VITAMIN C) 500 MG tablet Take 500 mg by mouth      carvedilol (COREG) 25 MG tablet Take 25 mg by mouth 2 times daily (with meals)      Cholecalciferol 50 MCG (2000 UT) TABS Take 2,000 Units by mouth      clopidogrel (PLAVIX) 75 MG tablet Take 75 mg by mouth daily      fluticasone (FLONASE) 50 MCG/ACT nasal spray USE 1 OR 2 SPRAYS IN EACH NOSTRIL EVERY DAY.       latanoprost (XALATAN) 0.005 % ophthalmic solution Apply 1 drop to eye nightly      magnesium oxide (MAG-OX) 400 (240 Mg) MG tablet TAKE 1 TABLET BY MOUTH EVERY DAY      montelukast (SINGULAIR) 10 MG tablet TAKE 1 TABLET BY MOUTH EVERY DAY AT BEDTIME      nitroGLYCERIN (NITROSTAT) 0.4 MG SL tablet Place 0.4 mg under the tongue      polyethylene glycol (GLYCOLAX) 17 GM/SCOOP powder Take 17 g by mouth daily      [DISCONTINUED] metFORMIN (GLUCOPHAGE) 500 MG tablet TAKE TWO TABLETS BY MOUTH 2 TIMES A DAY       No current facility-administered medications on file prior to encounter. REVIEW OF SYSTEMS  The patient has no difficulty with chest pain or shortness of breath. No fever or chills. Comprehensive review of systems was otherwise unremarkable except as noted above. Objective:   Physical Exam:   Recent vitals (if inpt):  Patient Vitals for the past 24 hrs:   BP Temp Temp src Pulse Height Weight   11/10/22 1459 (!) 152/76 97.6 °F (36.4 °C) Temporal 61 6' (1.829 m) 224 lb (101.6 kg)       BP (!) 152/76   Pulse 61   Temp 97.6 °F (36.4 °C) (Temporal)   Ht 6' (1.829 m)   Wt 224 lb (101.6 kg)   BMI 30.38 kg/m²   Wt Readings from Last 3 Encounters:   11/10/22 224 lb (101.6 kg)   11/03/22 224 lb (101.6 kg)   10/31/22 213 lb (96.6 kg)     Constitutional: Alert, oriented, cooperative patient in no acute distress; appears stated age;  General appearance is within normal limits for wound care patient population. See the today's recorded vitals signs and constitutional data. Eyes: Pupils equal; Sclera are clear. EOMs intact; The eyes appear to track and move normally. The sclera are not injected. The conjunctive are clear. The eyelids are normal. There is no scleral icterus. ENMT: There are no obvious external ear, nose, lip or mouth lesions. Nares normal; Neck: Overall contour of the neck is normal with no obvious neck masses. Gross hearing is within normal limits. No obvious neck masses  Resp:  Breathing is non-labored; normal rate and effort; no audible wheezing. CV: RRR;  no JVD; No evidence of cyanosis of the upper extremities. The extremities are perfused without embolic sign, splinter hemorrhages, or petechia. GI: soft and non-distended without acute abnormality noted. Musculoskeletal: unremarkable with normal function. No embolic signs or cyanosis. Neuro:  Oriented; moves all 4; no focal deficits  Psychiatric: Judgement and insight are within normal limits for the wound care population of patients. Patient is oriented to person, place, and time. Recent and remote memory are within normal limits. Mood and affect are within normal limits. Integumentary (Skin/Wounds)  See inspection of wound(s) below. There are no other skin areas of palpable concern. See wound center documentation including photos in the [facility] [address] [place] [address] Wound Template made part of this record by reference. Wound Care Documentation:    Wound 09/09/22 Heel Left;Lateral (Active)   Wound Image   11/10/22 1501   Wound Etiology Diabetic Martinez 2 11/10/22 1501   Dressing Status Old drainage noted 11/10/22 1501   Wound Cleansed Cleansed with saline 11/10/22 1501   Dressing/Treatment Betadine swabs/povidone iodine 11/10/22 1501   Offloading for Diabetic Foot Ulcers Offloading boot 11/10/22 1501   Wound Length (cm) 1.7 cm 11/10/22 1501   Wound Width (cm) 2.9 cm 11/10/22 1501   Wound Depth (cm) 0.2 cm 11/10/22 1501   Wound Surface Area (cm^2) 4.93 cm^2 11/10/22 1501   Change in Wound Size % (l*w) 17.83 11/10/22 1501   Wound Volume (cm^3) 0.986 cm^3 11/10/22 1501   Wound Healing % 62 11/10/22 1501   Wound Assessment Pink/red;Epithelialization;Slough;Eschar moist 11/10/22 1501   Drainage Amount Small 11/10/22 1501   Drainage Description Serosanguinous 11/10/22 1501   Odor None 11/10/22 1501   Joanna-wound Assessment Dry/flaky 11/10/22 1501   Margins Attached edges 10/31/22 1115   Wound Thickness Description not for Pressure Injury Full thickness 11/10/22 1501   Number of days: 62       Wound 09/13/22 Buttocks moisture skin damage (Active)   Number of days: 58        Initial WC visit 10/24/2022      Amount and/or Complexity of Data Reviewed and Analyzed:  I reviewed and analyzed all of the unique labs and radiologic studies that are shown below as well as any that are in the HPI, and any that are in the expanded problem list below  *Each unique test, order, or document contributes to the combination of 2 or combination of 3 in Category 1 below.   I also independently reviewed radiology images for studies I described above in the HPI or studies I have ordered. For this visit I also reviewed old records and prior notes. No results for input(s): WBC, HGB, PLT, NA, K, CL, CO2, BUN, CREA, GLU, INR, APTT, ALT, AML, AML, LCAD, PCO2, PO2, HCO3 in the last 72 hours. Invalid input(s): PTP, TBIL, TBILI, CBIL, SGOT, GPT, AP, LPSE, NH4, TROPT, TROIQ,  PH  No results for input(s): INR, APTT, ALT, AML in the last 72 hours. Invalid input(s): PTP, TBIL, TBILI, CBIL, SGOT, GPT, AP, LPSE    Most recent blood counts (CBC) review  Lab Results   Component Value Date    WBC 10.3 10/19/2022    HGB 9.3 (L) 10/28/2022    HCT 32.5 (L) 10/19/2022    MCV 99.7 10/19/2022     10/19/2022     Most recent chemistry (BMP) test review   Lab Results   Component Value Date/Time     10/19/2022 02:22 PM    K 4.0 10/19/2022 02:22 PM     10/19/2022 02:22 PM    CO2 29 10/19/2022 02:22 PM    BUN 15 10/19/2022 02:22 PM    CREATININE 0.80 10/28/2022 11:15 AM    CREATININE 1.20 10/19/2022 02:22 PM    GLUCOSE 156 10/19/2022 02:22 PM    CALCIUM 9.2 10/19/2022 02:22 PM      Most recent Liver enzyme test review  Lab Results   Component Value Date    ALT 78 (H) 10/19/2022    AST 28 10/19/2022    ALKPHOS 116 10/19/2022    BILITOT 0.5 10/19/2022     Most recent coagulation (coags) review  @lastinr@  Lab Results   Component Value Date/Time    INR 1.3 07/30/2022 09:53 AM    APTT 63.6 08/02/2022 05:41 AM    APTT 24.3 03/04/2022 03:57 PM       Diabetic assessment  Hemoglobin A1C   Date Value Ref Range Status   08/10/2022 6.0 (H) 4.8 - 5.6 % Final       Nutritional assessment screen to assess wound healing ability:  Lab Results   Component Value Date    LABALBU 3.2 10/19/2022     No results found for: TP, ALBR, ALB    Xray Result (most recent):  XR PELVIS (1-2 VIEWS) 10/18/2022    Narrative  Pelvis single view 10/18/2022    CLINICAL HISTORY: Fractured lower back.     FINDINGS:  2 similar frontal views of the pelvis are submitted for evaluation. No abnormal  widening is seen of the sacroiliac joints or pubic symphysis. No acute displaced  fracture is seen of the pelvic ring. Assessment of the lower lumbar spine and  proximal femurs is limited by the lack of lateral views. Kyphoplasty repair is  seen at the L4 vertebral body level with some vertebral body height loss  adjustment at this level. No clear acute changes of the bilateral hips are seen. Relatively advanced atherosclerotic changes are seen of regional arterial  structures. Impression  1. Kyphoplasty repair at L4. No acute displaced fracture is seen of the bony  pelvis. CT Result (most recent):  CT FNA WITH IMAGING GUIDED 09/14/2022    Narrative  PROCEDURE: CT-guided Large Bore Needle Aspiration and Core Biopsy. Procedural Personnel  Attending physician(s): Spike Deng M.D. Pre-procedure diagnosis: 44-year-old man with history of vertebral compression  fracture status post kyphoplasty 7/19/2022. Postoperative complications include  probable osteomyelitis and perivertebral abscess/psoas muscle abscess. CT-guided perivertebral fluid aspiration 8/1/2022 returned 2-3 cc of purulent  fluid that demonstrated high white blood cell count but no bacterial growth in  culture. Patient has completed 6 weeks of antibiotic therapy yet continues to  complain of low back pain and hip pain. Patient is currently in an antibiotic  holiday. Repeat MRI 9/6/2022 demonstrates possible small fluid collection  versus phlegmon adjacent to the L4 vertebral body or within the adjacent psoas  muscle. There is multilevel lumbar degenerative disc disease. Eliquis and  Plavix have been held in anticipation of fluid aspiration today. Today, the  patient complains only of hip pain. Post-procedure diagnosis: Same  Indication: Obtain specimen for microbiology evaluation  Previous biopsy of same target (QCDR):  Yes    Complications: No immediate complications. Impression  CT-guided Needle aspiration and core biopsy of of the phlegmon  adjacent to the left side of L3-L4 disc and L4 vertebral body. The 1st needle  placement adjacent to the L4 vertebral body returned no fluid, therefore a core  biopsy was obtained. The 2nd needle placement adjacent to the L3-L4 vertebral  body initially returned no fluid, and therefore a few cc of inoculum saline was  administered and then aspirated. Plan:  Specimen(s) sent to the Microbiology department for evaluation. One hour  bedrest.  Resume Plavix tomorrow. Resume Eliquis in 48 hours. _______________________________________________________________  Technique: All CT scans at this facility are performed using dose  reduction/dose modulation techniques, as appropriate to the performed exam,  including the following:  Automated Exposure Control; Adjustment of the MA  and/or kF according to patient size (this includes techniques or standardized  protocols for targeted exams where dose is matched to indication/reason for  exam); and Use of Iterative Reconstruction Technique. PROCEDURE SUMMARY:  - Percutaneous CT-guided Coaxial Core Needle Biopsy and Fluid Aspiration    PROCEDURE DETAILS:    Pre-procedure  Reference imaging for biopsy target: MRI 9/6/2022, 7/29/2022, 6/2/2022 as well  as CT 7/27/2022    Consent:  Informed written and oral consent for the procedure was obtained after  explanation of risks (including, but not limitted to:  hemorrhage, infection,  visceral injury, nondiagnostic biopsy) benefits and alternatives. The patient's  questions were answered to their satisfaction. They stated understanding and  requested that we proceed. Final verification:  A time-out identifying the patient and planned procedure  was performed prior to this procedure.     Preparation: (MIPS) Maximal sterile barrier technique (including:  cap, mask,  sterile gown, sterile gloves, sterile sheet, hand hygiene, and cutaneous  antisepsis) was used. Anesthesia/sedation  Level of anesthesia/sedation: Moderate sedation (conscious sedation)  Anesthesia/sedation administered by: Independent trained observer under  attending supervision with continuous monitoring of the patient?s level of  consciousness and physiologic status  Total intra-service sedation time (minutes): 27    Imaging prior to biopsy  The patient was positioned prone. Initial imaging was performed using  noncontrast CT. Biopsy target:  - Maximal diameter (cm): 0.7 -  Location: soft tissue density to the left of the L4 vertebral body. Other findings: Less distinct soft tissue density to the left of the L3-L4 disc  space. Biopsy  Local anesthesia was administered. Under CT guidance, the coaxial biopsy system  was advanced to the target and aspiration was attempted but no fluid was  returned. Therefore, through the coaxial needle, an 18-gauge core biopsy was  obtained and then placed into a sterile container. Coaxial needle: 17 gauge  Core needle biopsy device: ERCOM  Core needle size: 18 gauge  Number of core specimens: 1    Attention was then turned to the subtle soft tissue density adjacent to the  L3-L4 disc. The coaxial needle was reassembled and then redirected using  intermittent CT scan imaging to this 2nd soft tissue density. The 17-gauge  coaxial needle was aspirated, returning no fluid. Therefore, 3 cc of inoculum  saline was administered and then aspirated as specimen. Large bore needle aspiration device: ERCOM  Fine needle size: 17-gauge  Number of FNA specimens: 1    Needle removal  The biopsy needle was removed and a sterile dressing was applied. Tract embolization: None    Imaging following biopsy  Immediate post-biopsy imaging was not performed. Imaging modality: Not applicable.   Post-biopsy imaging findings: Not applicable    Contrast  Contrast agent: None  Contrast volume (mL): 0    Radiation Dose  CT dose length product (mGy-cm):  957.09    Additional Details  Additional description of procedure: None  Equipment details: None  Specimens removed: Microbiology  Estimated blood loss (mL): Less than 10  Standardized report: [email or web address]    Attestation  Signer name: Alysa Mejía M.D. I attest that I personally performed the entire procedure. I reviewed the stored  images and agree with the report as written. 07/06/22    VAS DUP LOWER EXT ARTERIES BILATERAL W JOVANA 07/07/2022  7:57 AM (Final)    Interpretation Summary    Right lower extremity: The JOVANA (ankle-brachial index) is 0.78 and is abnormal. The lower extremity arterial duplex reveals an occlusion of the proximal superficial femoral artery with reconstitution at the distal proximal superficial femoral artery. There is monophasic flow in the GILBERTO, PTA and peroneal arteries at the ankle. The great toe pressure is 64mmHg. Left lower extremity: Right lower extremity: The JOVANA (ankle-brachial index) is 0.60 and is abnormal. The lower extremity arterial duplex reveals an occlusion of the proximal superficial femoral artery with reconstitution at the below knee popliteal artery. There is monophasic flow in the GILBERTO, PTA and peroneal arteries at the ankle. The great toe pressure is 63mmHg. The abdominal aorta was evaluated and measured 2.8cm x 2.9cm at its maximum diameter, no aneurysm visualized on limited evaluation of abdominal aorta.     Signed by: Bascom Hammans, MD on 7/7/2022  7:57 AM        Admission date (for inpatients): 11/10/2022   Day of Surgery  * No procedures listed *    Assessment:      Problem List Items Addressed This Visit          Circulatory    Arterial degeneration    Relevant Orders    Initiate Outpatient Wound Care Protocol    PAD (peripheral artery disease) ([address]    Relevant Orders    Initiate Outpatient Wound Care Protocol    DM (diabetes mellitus) type II, controlled, with peripheral vascular disorder ([address]    Relevant Orders Initiate Outpatient Wound Care Protocol       Endocrine    Diabetic neuropathy ([facility]    Relevant Orders    Initiate Outpatient Wound Care Protocol       Other    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 *  Resolved Problems:    * No resolved hospital problems.  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 ([facility] 10/24/2022     Priority: High    Antiplatelet or antithrombotic long-term use 10/24/2022     Priority: High     Eliquis and Plavix      Chest pain 12/05/2018     Priority: High    Lumbar discitis 09/08/2022     Priority: Medium    Psoas muscle abscess ([facility] 09/08/2022     Priority: Medium    Physical debility 08/26/2022     Priority: Medium    General weakness 07/27/2022     Priority: Medium    Acute cystitis without hematuria 07/27/2022     Priority: Medium    Low back pain without sciatica 07/27/2022     Priority: Medium    Back pain 07/27/2022     Priority: Medium    DNI (do not intubate) 07/27/2022     Priority: Medium    Elevated liver enzymes 07/27/2022     Priority: Medium    Anemia 07/27/2022     Priority: Medium    Hyponatremia 07/27/2022     Priority: Medium    ICD (implantable cardioverter-defibrillator) in place 06/16/2022     Priority: Medium    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, vertebra(e), init ([facility] 07/07/2022     Priority: Low     Added automatically from request for surgery [id number]      Atrial fibrillation ([facility] 03/04/2022     Priority: Low Ventricular tachycardia 03/04/2022     Priority: Low    VT (ventricular tachycardia) 03/04/2022     Priority: Low    Acute metabolic encephalopathy 53/55/2929     Priority: Low    Irregular heart beat 03/02/2021     Priority: Low    PVC's (premature ventricular contractions) 03/02/2021     Priority: Low    Elevated troponin 12/31/2020     Priority: Low    Toe laceration 12/09/2019     Priority: Low    Type 2 diabetes with nephropathy ([facility] 06/17/2019     Priority: Low    Hypoxia 02/08/2019     Priority: Low    Sepsis ([facility] 02/08/2019     Priority: Low    Abnormal nuclear cardiac imaging test 11/27/2018     Priority: Low    Cigarette nicotine dependence in remission 08/20/2018     Priority: Low    Chronic pain of right knee 12/14/2017     Priority: Low    Obstructive sleep apnea 08/11/2017     Priority: Low    Intermittent spinal claudication ([facility] 06/13/2017     Priority: Low    Pulmonary emphysema ([facility] 11/17/2016     Priority: Low    H/O endarterectomy 11/10/2015     Priority: Low    Complicated wound infection 11/10/2015     Priority: Low    Allergic rhinitis 11/10/2015     Priority: Low    Hiatal hernia 11/10/2015     Priority: Low    Diabetic neuropathy ([facility] 11/10/2015     Priority: Low    Osteoarthritis 11/10/2015     Priority: Low    Encounter for long-term (current) drug use 11/10/2015     Priority: Low    Benign neoplasm of colon 11/10/2015     Priority: Low    PAD (peripheral artery disease) ([facility] 11/10/2015     Priority: Low    Asthma 11/10/2015     Priority: Low    Orthostatic hypotension 11/10/2015     Priority: Low    Hyperlipidemia 11/10/2015     Priority: Low    S/P angioplasty with stent 11/10/2015     Priority: Low    COPD (chronic obstructive pulmonary disease) ([facility] 04/10/2015     Priority: Low    Arterial degeneration 11/05/2014     Priority: Low     11/6/14 (Dr Cayden Bernal) 1. Bleeding from right groin wound.    2. Disruption of right common femoral artery patch anastomosis and   possible arterial infection. Normocytic anemia 10/23/2014     Priority: Low    MINI (acute kidney injury) ([address] 09/15/2014     Priority: Low    DM (diabetes mellitus) type II, controlled, with peripheral vascular disorder ([address] 09/11/2014     Priority: Low    Atherosclerosis of native arteries of extremity with intermittent claudication ([address] 08/15/2014     Priority: Low     9/11/14 (Dr Rosalee Yanes) Bilateral common femoral endarterectomies. Personal history of tobacco use, presenting hazards to health 02/12/2013     Priority: Low    Asbestos exposure 02/12/2013     Priority: Low    HTN (hypertension) 10/12/2011     Priority: Low    Severe obesity (BMI 35.0-39. 9) with comorbidity ([address] 10/12/2011     Priority: Low    Sleep apnea 10/12/2011     Priority: Low    Ischemic cardiomyopathy 10/12/2011     Priority: Low    Coronary artery disease involving native coronary artery of native heart without angina pectoris 10/12/2011     Priority: Low           Plan:     Number and Complexity of Problems addressed and   Risks of complications and/or morbidity of management    Treatment Note please see attached Discharge Instructions  Any procedures done today in the wound center (if documented in a separate note) in [facility] are made part of this record by reference  Written patient dismissal instructions given to patient or POA. 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 He has had an ulcer on the left heel for several months.   He has been debilitated following back surgery but is also debilitated from multiple medical problems including cardiac disease, peripheral vascular disease, and diabetes. He is anticoagulated on Eliquis and Plavix. He has had femoral endarterectomies in the past and is now followed by Dr. Carisa Wilson. JOVANA in July was abnormal and has not been reevaluated since deterioration of his tissue. He is scheduled for repeat JOVANA in January and we will consult vascular surgery for earlier evaluation. Vascular consultation to follow as needed. He has not been offloading despite having work boots. There is clearly a pressure component. There is no mirror lesion on the right heel and may reflect his discrepancy in blood supply. He does describe some episodes that could be rest pain though it does not drop his legs for improvement. This may also be limited by his debility. We will not do debridement today. We will dress with Betadine and an absorbent pad and follow-up in 1 week. Offloading was emphasized. He returns on 10/31/2022. He underwent arteriography but is not a candidate for percutaneous intervention. He is being set up for formal bypass in November. Therapy is having him walk on his foot and offloading was again stressed. Current dressing remains Betadine. I will see him back in 1.5 weeks which will be before his bypass surgery. He returns on 11/10/2022. He is improved and has completed antibiotics. Current dressing is Betadine. He will see vascular surgery on Monday to schedule bypass surgery. I will see him back in 3 weeks.       Wound Care  Orders Placed This Encounter   Procedures    Initiate Outpatient Wound Care Protocol     Cleanse wound with saline    If wound contains bioburden or contamination cleanse with wound cleanser or antimicrobial solution     For normal periwound tissue without irritation nor maceration, apply topical skin protectant    For periwound tissue with irritation and/or maceration, apply zinc based product, topical steroid cream/ointment, or equivalent     For wounds with dry firm black eschar and/or without exudate, apply betadine and leave open to air      For wounds with scant/small to no exudate or drainage, apply wound gel, hydrocolloid, polymer, or equivalent and cover with secondary dressing/foam      For wounds with moderate/large exudate or drainage, apply alginate, hydrofiber, polymer, or equivalent and cover with secondary dressing/foam    For wounds with nonviable tissue requiring removal, apply chemical or mechanical debrider and cover with secondary dressing/foam    For wounds with tunneling, dead space, or cavity, fill or pack with strip/gauze/kerlex to fit and cover with secondary dressing/foam    For wounds with adequate granulation or epithelization, apply wound gel, hydrocolloid, polymer, collagen, or transparent film, and cover secondary dry dressing/foam    For wounds that need additional secondary dressing to help pad or control additional drainage/exudates, add foam, absorbent pad or hydrocolloid    For wounds with suspected or known infection, apply antimicrobial mesh and/or antimicrobial alginate/hydrofiber, or antimicrobial solution moistened gauze/kerlex, or equivalent and cover with secondary dressing/foam    Compression Management needed for edema control, apply multilayer compression or tubular garment or equivalent    Offloading Management needed for pressure relief, apply offloading shoe/boot or equivalent     Standing Status:   Standing     Number of Occurrences:   1       Return Appointment:   3 weeks with Halley Bro MD        Instructions: Left Lateral Heel:  Cleanse with Normal Saline  Paint with Betadine Daily  Cover with Bandaid/Bordered Foam or ABD and wrap with Rolled Gauze. May leave open to air while reclining.      Float Heel When Patient in [facility]  When in [facility] and When Heel is Unable to be [employer]           Level of MDM (2/3 elements below)  Number and Complexity of Problems Addressed Amount and/or Complexity of Data to be Reviewed and Analyzed  *Each unique test, order, or document contributes to the combination of 2 or combination of 3 in Category 1 below. Risk of Complications and/or Morbidity or Mortality of pt Management     44733  17327 SF Minimal  ?1self-limited or minor problem Minimal or none Minimal risk of morbidity from additional diagnostic testing or Rx   91782  42278 Low Low  ? 2or more self-limited or minor problems;    or  ? 1stable chronic illness;    or  ?1acute, uncomplicated illness or injury   Limited  (Must meet the requirements of at least 1 of the 2 categories)  Category 1: Tests and documents   ? Any combination of 2 from the following:  ?Review of prior external note(s) from each unique source*;  ?review of the result(s) of each unique test*;   ?ordering of each unique test*    or   Category 2: Assessment requiring an independent historian(s)  (For the categories of independent interpretation of tests and discussion of management or test interpretation, see moderate or high) Low risk of morbidity from additional diagnostic testing or treatment     76263  57084 Mod Moderate  ? 1or more chronic illnesses with exacerbation, progression, or side effects of treatment;    or  ?2or more stable chronic illnesses;    or  ?1undiagnosed new problem with uncertain prognosis;    or  ?1acute illness with systemic symptoms;    or  ?1acute complicated injury   Moderate  (Must meet the requirements of at least 1 out of 3 categories)  Category 1: Tests, documents, or independent historian(s)  ? Any combination of 3 from the following:   ?Review of prior external note(s) from each unique source*;  ?Review of the result(s) of each unique test*;  ?Ordering of each unique test*;  ?Assessment requiring an independent historian(s)    or  Category 2: Independent interpretation of tests   ? Independent interpretation of a test performed by another physician/other qualified health care professional (not separately reported);     or  Category 3: Discussion of management or test interpretation  ? Discussion of management or test interpretation with external physician/other qualified health care professional/appropriate source (not separately reported)   Moderate risk of morbidity from additional diagnostic testing or treatment  Examples only:  ?Prescription drug management - advanced wound care prescription Rx wound care products  (eg Iodosorb, hydrofera, silvadene, collagen, puracol plus, optiloc, biologics - placenta, Theraskin, Apligraf). Note also that if home health care is involved, they will not apply topical therapies without a prescription/order from physician      ? Decision regarding minor surgery with identified patient or procedure risk factors  ? Decision regarding elective major surgery without identified patient or procedure risk factors   ? Diagnosis or treatment significantly limited by social determinants of health       [id number] High High  ? 1or more chronic illnesses with severe exacerbation, progression, or side effects of treatment;    or  ?1 acute or chronic illness or injury that poses a threat to life or bodily function   Extensive  (Must meet the requirements of at least 2 out of 3 categories)  Category 1: Tests, documents, or independent historian(s)  ? Any combination of 3 from the following:   ?Review of prior external note(s) from each unique source*;  ?Review of the result(s) of each unique test*;   ?Ordering of each unique test*;   ?Assessment requiring an independent historian(s)    or   Category 2: Independent interpretation of tests   ? Independent interpretation of a test performed by another physician/other qualified health care professional (not separately reported);     or  Category 3: Discussion of management or test interpretation  ? Discussion of management or test interpretation with external physician/other qualified health care professional/appropriate source (not separately reported)   High risk of morbidity from additional diagnostic testing or treatment  Examples only:  ?Drug therapy requiring intensive monitoring for toxicity  ? Decision regarding elective major surgery with identified patient or procedure risk factors  ? Decision regarding emergency major surgery  ? Decision regarding hospitalization  ? Decision not to resuscitate or to de-escalate care because of poor prognosis                 I have personally performed a face-to-face diagnostic evaluation and management  service on this patient. I have independently seen the patient. I have independently obtained the above history from the patient/family. I have independently examined the patient with above findings. I have independently reviewed data/labs for this patient and developed the above plan of care (MDM).       Electronically signed by Ramona Blakely MD on 11/10/2022 at 6:21 PM

## 2022-11-10 NOTE — DISCHARGE INSTRUCTIONS
Discharge Instructions for  Windy Fairchild  [address] [facility]  [place] [address], [address]  Phone [phone]   Fax [phone]      NAME:  Alycia Taveras  YOB: 1945  MEDICAL RECORD NUMBER:  [id number]  DATE:  @ED@    Return Appointment:   3 weeks with David Claudio MD      Instructions: Left Lateral Heel:  Cleanse with Normal Saline  Paint with Betadine Daily  Cover with Bandaid/Bordered Foam or ABD and wrap with Rolled Gauze. May leave open to air while reclining. Float Heel When Patient in [place] [address]  When in [address] and When Heel is Unable to be [employer]           Should you experience increased redness, swelling, pain, foul odor, size of wound(s), or have a temperature over 101 degrees please contact the [address] [place] [address] Road at [phone] or if after hours contact your primary care physician or go to the hospital emergency department. PLEASE NOTE: IF YOU ARE UNABLE TO OBTAIN WOUND SUPPLIES, CONTINUE TO USE THE SUPPLIES YOU HAVE AVAILABLE UNTIL YOU ARE ABLE TO REACH US. IT IS MOST IMPORTANT TO KEEP THE WOUND COVERED AT ALL TIMES.     Electronically signed Sherine Dietz RN on 11/10/2022 at 3:44 PM

## 2022-11-10 NOTE — FLOWSHEET NOTE
11/10/22 1501   Wound 09/09/22 Heel Left;Lateral   Date First Assessed/Time First Assessed: 09/09/22 1535   Present on Hospital Admission: Yes  Primary Wound Type: Diabetic Ulcer  Location: Heel  Wound Location Orientation: Left;Lateral   Wound Image    Wound Etiology Diabetic Martinez 2   Dressing Status Old drainage noted   Wound Cleansed Cleansed with saline   Dressing/Treatment Betadine swabs/povidone iodine   Offloading for Diabetic Foot Ulcers Offloading boot   Wound Length (cm) 1.7 cm   Wound Width (cm) 2.9 cm   Wound Depth (cm) 0.2 cm   Wound Surface Area (cm^2) 4.93 cm^2   Change in Wound Size % (l*w) 17.83   Wound Volume (cm^3) 0.986 cm^3   Wound Healing % 62   Wound Assessment Pink/red;Epithelialization;Slough;Eschar moist   Drainage Amount Small   Drainage Description Serosanguinous   Odor None   Joanna-wound Assessment Dry/flaky   Wound Thickness Description not for Pressure Injury Full thickness     Patient is currently taking eliquis and plavix

## 2022-11-10 NOTE — WOUND CARE
Discharge Instructions for  Windy Fairchild  [address] [facility] [address]  [address] [place] [address], [address]  Phone [phone]   Fax [phone]      NAME:  Julio Lamas OF BIRTH:  1945  MEDICAL RECORD NUMBER:  [id number]  DATE:  11/10/2022    Return Appointment:   3 weeks with Donovan Simmons MD      Instructions: Left Lateral Heel:  Cleanse with Normal Saline  Paint with Betadine Daily  Cover with Bandaid/Bordered Foam or ABD and wrap with Rolled Gauze. May leave open to air while reclining. Float Heel When Patient in [place] [address]  When in [address] and When Heel is Unable to be [employer]           Should you experience increased redness, swelling, pain, foul odor, size of wound(s), or have a temperature over 101 degrees please contact the [address] [place] [address] Road at [phone] or if after hours contact your primary care physician or go to the hospital emergency department. PLEASE NOTE: IF YOU ARE UNABLE TO OBTAIN WOUND SUPPLIES, CONTINUE TO USE THE SUPPLIES YOU HAVE AVAILABLE UNTIL YOU ARE ABLE TO REACH US. IT IS MOST IMPORTANT TO KEEP THE WOUND COVERED AT ALL TIMES.     Electronically signed Ines Witt RN on 11/10/2022 at 3:45 PM

## 2022-11-14 ENCOUNTER — OFFICE VISIT (OUTPATIENT)
Dept: VASCULAR SURGERY | Age: 77
End: 2022-11-14

## 2022-11-14 ENCOUNTER — PREP FOR PROCEDURE (OUTPATIENT)
Dept: VASCULAR SURGERY | Age: 77
End: 2022-11-14

## 2022-11-14 VITALS
BODY MASS INDEX: 30.34 KG/M2 | SYSTOLIC BLOOD PRESSURE: 132 MMHG | HEIGHT: 72 IN | WEIGHT: 224 LBS | HEART RATE: 59 BPM | DIASTOLIC BLOOD PRESSURE: 78 MMHG | OXYGEN SATURATION: 94 %

## 2022-11-14 DIAGNOSIS — I70.262 CRITICAL LIMB ISCHEMIA OF LEFT LOWER EXTREMITY WITH GANGRENE (HCC): Primary | ICD-10-CM

## 2022-11-14 PROCEDURE — 99024 POSTOP FOLLOW-UP VISIT: CPT | Performed by: STUDENT IN AN ORGANIZED HEALTH CARE EDUCATION/TRAINING PROGRAM

## 2022-11-14 ASSESSMENT — PATIENT HEALTH QUESTIONNAIRE - PHQ9
SUM OF ALL RESPONSES TO PHQ QUESTIONS 1-9: 0
SUM OF ALL RESPONSES TO PHQ9 QUESTIONS 1 & 2: 0
2. FEELING DOWN, DEPRESSED OR HOPELESS: 0
1. LITTLE INTEREST OR PLEASURE IN DOING THINGS: 0
SUM OF ALL RESPONSES TO PHQ QUESTIONS 1-9: 0

## 2022-11-14 NOTE — PROGRESS NOTES
significant hearing loss and tinnitus. Respiratory:   Negative for hemoptysis. Cardiovascular:   Negative except as noted in HPI. Gastrointestinal:   Negative for melena and abdominal pain. Genitourinary:   Negative for hematuria, renal stones. Integumentary:   Negative for rash or non-healing wounds  Hematologic/Lymphatic:   Negative for excessive bleeding hx or clotting disorder. Musculoskeletal:  Negative for active, unexplained/severe joint pain. Neurological:   Negative for stroke. Behavioral/Psych:   Negative for suicidal ideations. Endocrine:   Negative for uncontrolled diabetic symptoms including polyuria, polydipsia and poor wound healing. Physical Examination:   Height: 6' (1.829 m), Weight: 224 lb (101.6 kg), BP: 132/78    General:Well-developed. No distress. HENT: AT  CV: Regular rhythm. LUNG: Effort normal and breath sounds normal. No respiratory distress. Abdominal: Soft. Bowel sounds are normal. he exhibits no distension. There is no tenderness. There is no guarding. No hernia. Extremities:Left 2.5cm x 2.5cm heal ulcer with clean base, no erythema  Vascular:  Radial:, L, 2+ , R, 2+  , Femoral:, L, 2+ , R, 2+  , DP:, L absent R 1+    Rosetta Oviedo MD    Elements of this note have been dictated using speech recognition software. As a result, errors of speech recognition may have occurred.

## 2022-11-15 ENCOUNTER — TELEPHONE (OUTPATIENT)
Dept: CARDIOLOGY CLINIC | Age: 77
End: 2022-11-15

## 2022-11-15 NOTE — TELEPHONE ENCOUNTER
Patient's wife Cam Kumar called and said the patient has been in the hospital and rehab for a total of 2 months and 1 week. Patient has a wound on his foot from being hospitalized so long and being in the bed. He is going to the wound center for this and they have brought on a vascular surgeon. The vascular surgeon wants patient to see Dr. Tuan Rich or any [facility] cardiologist to give the patient clearance for surgery asap. Surgeon said as soon as patient is cleared they can get more aggressive with his treatment & maybe try hyperbaric chamber therapy also. The reason he was in the hospital so long started out as a back surgery but Arelis Banks said there is now an abscess in his back since the surgery. Wife-Lu said they will be home until about 10:00 am then they will be going to another doctors appointment.     [phone] Home until 10:00 am  [phone] Lu-wife cell  [phone] Patient-Neville olsen

## 2022-11-15 NOTE — TELEPHONE ENCOUNTER
Vascular surgery is scheduled for 1/10/22. Pt needs appt for cardiac clearance prior to surgery. Appt is scheduled for 12/8/22 in [facility] with Dr. Monica Ospina. Lu confirms this appt.

## 2022-12-01 ENCOUNTER — HOSPITAL ENCOUNTER (OUTPATIENT)
Dept: WOUND CARE | Age: 77
Discharge: HOME | End: 2022-12-01
Payer: MEDICARE

## 2022-12-01 VITALS
WEIGHT: 224 LBS | HEART RATE: 60 BPM | SYSTOLIC BLOOD PRESSURE: 130 MMHG | DIASTOLIC BLOOD PRESSURE: 62 MMHG | BODY MASS INDEX: 30.34 KG/M2 | HEIGHT: 72 IN

## 2022-12-01 DIAGNOSIS — E66.01 SEVERE OBESITY (BMI 35.0-39.9) WITH COMORBIDITY (HCC): ICD-10-CM

## 2022-12-01 DIAGNOSIS — L97.422 NON-PRESSURE CHRONIC ULCER OF LEFT HEEL AND MIDFOOT WITH FAT LAYER EXPOSED (HCC): Primary | ICD-10-CM

## 2022-12-01 DIAGNOSIS — E11.51 DM (DIABETES MELLITUS) TYPE II, CONTROLLED, WITH PERIPHERAL VASCULAR DISORDER (HCC): ICD-10-CM

## 2022-12-01 DIAGNOSIS — I70.90: ICD-10-CM

## 2022-12-01 DIAGNOSIS — R53.81 PHYSICAL DEBILITY: ICD-10-CM

## 2022-12-01 DIAGNOSIS — I73.9 PAD (PERIPHERAL ARTERY DISEASE) (HCC): ICD-10-CM

## 2022-12-01 DIAGNOSIS — I70.213 ATHEROSCLEROSIS OF NATIVE ARTERY OF BOTH LOWER EXTREMITIES WITH INTERMITTENT CLAUDICATION (HCC): ICD-10-CM

## 2022-12-01 DIAGNOSIS — Z79.02 ANTIPLATELET OR ANTITHROMBOTIC LONG-TERM USE: ICD-10-CM

## 2022-12-01 DIAGNOSIS — E11.42 DIABETIC POLYNEUROPATHY ASSOCIATED WITH TYPE 2 DIABETES MELLITUS (HCC): ICD-10-CM

## 2022-12-01 PROCEDURE — 99214 OFFICE O/P EST MOD 30 MIN: CPT | Performed by: SURGERY

## 2022-12-01 PROCEDURE — 99214 OFFICE O/P EST MOD 30 MIN: CPT

## 2022-12-01 NOTE — FLOWSHEET NOTE
12/01/22 1337   Wound 09/09/22 Heel Left;Lateral   Date First Assessed/Time First Assessed: 09/09/22 1535   Present on Hospital Admission: Yes  Primary Wound Type: Diabetic Ulcer  Location: Heel  Wound Location Orientation: Left;Lateral   Wound Image    Wound Etiology Diabetic Martinez 2   Dressing Status Old drainage noted   Wound Cleansed Cleansed with saline   Dressing/Treatment Betadine swabs/povidone iodine   Offloading for Diabetic Foot Ulcers Offloading boot   Wound Length (cm) 1 cm   Wound Width (cm) 1.8 cm   Wound Depth (cm) 0.2 cm   Wound Surface Area (cm^2) 1.8 cm^2   Change in Wound Size % (l*w) 70   Wound Volume (cm^3) 0.36 cm^3   Wound Healing % 86   Wound Assessment Pink/red;Epithelialization   Drainage Amount Small   Drainage Description Serosanguinous   Odor None   Joanna-wound Assessment Hyperkeratosis (callous)   Wound Thickness Description not for Pressure Injury Full thickness   Patient is currently taking eliquis and plavix

## 2022-12-01 NOTE — DISCHARGE INSTRUCTIONS
Discharge Instructions for  Winyd Fairchild  [address] [place] [address]  [address], [address]   Phone [phone]   Fax [phone]      NAME:  Kj Busby  YOB: 1945  MEDICAL RECORD NUMBER:  [id number]  DATE:  @ED@    Return Appointment:   1 week with Rina Cook MD      Instructions: Left Lateral Heel:  Cleanse with Normal Saline  Apply collagen with silver. Cut product to approximate wound size and apply to wound bed. Cover with Bandaid/Bordered Foam or ABD and wrap with Rolled Gauze. Delay vascular surgery if possible  Order for puraply possible placement at next visit     Float Heel When Patient in [address]  When in [facility] and When Heel is Unable to be [email or web address]           Should you experience increased redness, swelling, pain, foul odor, size of wound(s), or have a temperature over 101 degrees please contact the [address] [place] [address] Road at [phone] or if after hours contact your primary care physician or go to the hospital emergency department. PLEASE NOTE: IF YOU ARE UNABLE TO OBTAIN WOUND SUPPLIES, CONTINUE TO USE THE SUPPLIES YOU HAVE AVAILABLE UNTIL YOU ARE ABLE TO REACH US. IT IS MOST IMPORTANT TO KEEP THE WOUND COVERED AT ALL TIMES.     Electronically signed Audelia Matos RN on 12/1/2022 at 2:14 PM

## 2022-12-01 NOTE — PROGRESS NOTES
[address]  Consult Note/H&P/Progress Note      [address] RECORD NUMBER:  [id number]  AGE: 65 y.o. RACE White (non-)  GENDER: male  : 1945  EPISODE DATE:  2022       Subjective:      CC (Chief Complaint) and HISTORY of PRESENT ILLNESS (HPI):    Angella Hills is a 65 y.o. male who presents today for wound/ulcer evaluation. He presents on 10/24/2022 with an ulcer wound on the left heel that has been present for several months. He was in his usual state of poor health when in July he had back surgery which led to a complicated abscess, prolonged hospital stay, and rehabilitation stay which ended just a few weeks ago. He is currently at home. He has significant debility. He is diabetic and has significant peripheral vascular disease with history of bilateral femoral endarterectomies. These were performed several years ago by Dr. Chalo Be. ABIs were performed in July just prior to his surgery with the JOVANA on the left of 0.6 with plan for follow-up this coming January to monitor. He has been lying in bed without use of Rooke boots and has developed an ulcer on the left heel. He has a history of MRSA infection in his back and was placed on a course of doxycycline. There is no evidence of active cellulitis today. He has had no imaging. He has not had repeat vascular evaluation. There is some description of pain in his leg at rest.  He is not active enough to discern any possible claudication. He is anticoagulated on both Eliquis and Plavix. There is no current dressing care plan. He returns on 10/31/2022. We are very fortunate that he was evaluated by vascular surgery who performed an arteriogram showing significant arterial disease. Unfortunately, his disease is not amenable to percutaneous intervention and will require a bypass in the coming weeks.   Debridement was not aggressively performed today but some loose tissue was selectively debrided and well-tolerated. He has been participating with home therapy including ambulation with a walker. Current dressing is Betadine. He returns on 11/10/2022. He is doing better with no evidence of cellulitis. He has completed antibiotics. The ulcer looks improved with current dressing of Betadine. He sees vascular on Monday to schedule bypass. He returns on 12/1/2022. He is actually improving with the heel ulcer showing improvement with just Betadine dressings. I believe he is offloading better than previously. I am not sure that the wife grasps the offloading and heel offloading concept. Vascular surgery has him set up for Femoral to distal bypass in January, but they feel he may heal without it. He was seen by ID who did not recommend further antibiotics for his foot or for his discitis. They recommend against the bypass if he continues to heal as well. He also developed issues with his eye with an acute glaucoma attack. He is being treated with nonsurgical methods at present but may need cataract surgery.     This information was obtained from the patient  The following HPI elements were documented for the patient's wound:  Location: Left heel  Duration: August/September 2022  Severity: Fat layer exposed  Context: Limited activity, much lying in bed, PVD, DM, no offloading  Modifying Factors:  Nothing makes better or worse  Quality: Full-thickness, painful  Timing: Constant  Associated Signs and Symptoms: Tissue loss and possible bout of cellulitis      Ulcer Identification:  Ulcer Type: Diabetic foot ulcer left heel with fat layer exposed  Contributing Factors: Inadequate offloading, PVD, anticoagulated          PAST MEDICAL HISTORY  Past Medical History:   Diagnosis Date    Acute respiratory failure with hypoxia ([address] 10/23/2014    MINI (acute kidney injury) ([address] 09/15/2014    MINI (acute kidney injury) ([address]     MINI (acute kidney injury) ([address] Allergic rhinitis 11/10/2015    Asthma 11/10/2015    Benign essential hypertension 11/10/2015    Benign neoplasm of colon 11/10/2015    CAD (coronary artery disease) 11/10/2015    CAD (coronary artery disease) 1998, 1999    mix2, 3 stents [id number]    CAP (community acquired pneumonia) 12/31/2020    Cardiomyopathy ([address] 68/19/1491    Complicated wound infection 11/10/2015    COPD (chronic obstructive pulmonary disease) with emphysema ([address] 11/10/2015    COPD exacerbation ([address] 10/12/2011    COVID-19 12/31/2020    Diabetes mellitus type 2, controlled ([address] 11/10/2015    doesn/t check daily oral meds, A1c 6.0 (8/10/22)    Diabetic neuropathy ([address] 11/10/2015    Disruption of wound, unspecified 10/09/2014    Encounter for long-term (current) use of other high-risk medications 11/10/2015    Extremity atherosclerosis with intermittent claudication ([address] 11/10/2015    H/O endarterectomy 11/10/2015    Hiatal hernia 11/10/2015    History of 2019 novel coronavirus disease (COVID-19)     12/31/2020- hospitalized    Hyperglycemia due to type 1 diabetes mellitus ([address] 11/10/2015    Hyperlipidemia 11/10/2015    ICD (implantable cardioverter-defibrillator) in place 06/16/2022    Monomorphic ventricular tachycardia 12/31/2020    Myocardial infarction ([address] 1999    Myocardial infarction ([address] 11/10/2015    Obesity 11/10/2015    Orthostatic hypotension 11/10/2015    Osteoarthritis 11/10/2015    Peripheral vascular disease ([address] 11/10/2015    PNA (pneumonia) 02/08/2019    PVC (premature ventricular contraction)     Rash 00/66/1387    Seroma complicating a procedure 10/02/2014    Sleep apnea     cpap    Toe laceration     Type 2 diabetes with nephropathy ([address]     Uncontrolled type 2 diabetes mellitus 11/10/2015    Vertiginous syndromes and other disorders of vestibular system 11/10/2015        PAST SURGICAL HISTORY  Past Surgical History:   Procedure Laterality Date    CARDIAC CATHETERIZATION  12/05/2018    LV EF=40%. LM:nl. LAD:30-40% mid stenosis. LCX OM1:95% stenosis. RCA:100% occlusion at RV branch. CATARACT REMOVAL Right 2022    CORONARY ANGIOPLASTY WITH STENT PLACEMENT  2018    LCX OM1:2.5 x 12 Boley RAKESH    CORONARY ANGIOPLASTY WITH STENT PLACEMENT      WA ABDOMEN SURGERY PROC UNLISTED      abdominal cyst removed    WA CARDIAC SURG PROCEDURE UNLIST      stents x3    SPINE SURGERY N/A 2022    LUMBAR 2, LUMBAR 4 KYPHOPLASTY AND ANY OTHER INDICATED LEVELS performed by Katherine Schneider III, MD at [facility] MAIN OR    VASCULAR SURGERY  14    Repair of right common femoral artery     VASCULAR SURGERY  14    tiffanie femoral endarterectomy    VASCULAR SURGERY Left 10/28/2022    LEFT LOWER EXTREMITY ARTERIOGRAM / ULTRASOUND GUIDED ACCESS   performed by Jennifer Vilchis MD at [facility] MAIN OR       FAMILY HISTORY  Family History   Problem Relation Age of Onset    Breast Cancer Mother     Hypertension Mother     Other Father         DIVERTICULITIS    Crohn's Disease Sister     Lung Disease Brother         mesothioloma    Diabetes Sister     Heart Attack Father     Heart Disease Father     Stroke Mother        SOCIAL HISTORY  Social History     Tobacco Use    Smoking status: Former     Packs/day: 1.00     Years: 50.00     Pack years: 50.00     Types: Cigarettes     Quit date: 10/2/2011     Years since quittin.1    Smokeless tobacco: Current     Types: Chew    Tobacco comments:     Chews tobacco daily   Vaping Use    Vaping Use: Never used   Substance Use Topics    Alcohol use:  Yes     Alcohol/week: 0.0 standard drinks    Drug use: No       ALLERGIES  Allergies   Allergen Reactions    Acetaminophen Hives     Per spouse has small amount of hives, takes 1/2 and tolerates     Azithromycin Hives    Morphine Hallucinations     Muscle twitching. jerking    Oxaprozin Other (See Comments)     ELEVATED BLOOD PRESSURE    Oxycodone Anxiety       MEDICATIONS  Current Outpatient Medications on File Prior to Encounter   Medication Sig Dispense Refill    valsartan (DIOVAN) 80 MG tablet Take 1 tablet by mouth daily 30 tablet 5    CPAP Machine MISC by Does not apply route      albuterol sulfate HFA (PROVENTIL;VENTOLIN;PROAIR) 108 (90 Base) MCG/ACT inhaler USE 2 PUFFS BY INHALATION 4 TIMES DAILY AS NEEDED FOR WHEEZING OR SHORTNESS OF BREATH. Patient prefers ventolin. 18 g 11    GUAIFENESIN 1200 PO Take 1 tablet by mouth every 12 hours as needed      tamsulosin (FLOMAX) 0.4 MG capsule Take 0.4 mg by mouth daily      traMADol (ULTRAM) 50 MG tablet Take 50 mg by mouth every 6 hours as needed for Pain. Taking 1/2 every morning      acetaminophen (TYLENOL) 500 MG tablet Take 500 mg by mouth every 6 hours as needed for Pain Taking 1/2 two times daily      glipiZIDE (GLUCOTROL XL) 10 MG extended release tablet Take 1 tablet by mouth daily 30 tablet 0    furosemide (LASIX) 20 MG tablet Take 1 tablet by mouth in the morning. (Patient taking differently: Take 20 mg by mouth daily 1/2 tablet) 60 tablet 3    [DISCONTINUED] amLODIPine (NORVASC) 5 MG tablet Take 1 tablet by mouth in the morning. 30 tablet 3    [DISCONTINUED] QUEtiapine (SEROQUEL) 25 MG tablet Take 1 tablet by mouth in the morning.  60 tablet 3    rosuvastatin (CRESTOR) 10 MG tablet TAKE 1 TABLET BY MOUTH EVERY DAY (Patient taking differently: at bedtime) 90 tablet 0    fluticasone-salmeterol (ADVAIR) 250-50 MCG/ACT AEPB diskus inhaler Inhale 1 puff into the lungs in the morning and at bedtime      amiodarone (CORDARONE) 200 MG tablet Take 200 mg by mouth daily      apixaban (ELIQUIS) 5 MG TABS tablet Take 5 mg by mouth every 12 hours      ascorbic acid (VITAMIN C) 500 MG tablet Take 500 mg by mouth      carvedilol (COREG) 25 MG tablet Take 25 mg by mouth 2 times daily (with meals)      Cholecalciferol 50 MCG (2000 UT) TABS Take 2,000 Units by mouth      clopidogrel (PLAVIX) 75 MG tablet Take 75 mg by mouth daily      fluticasone (FLONASE) 50 MCG/ACT nasal spray USE 1 OR 2 SPRAYS IN EACH NOSTRIL EVERY DAY. latanoprost (XALATAN) 0.005 % ophthalmic solution Apply 1 drop to eye nightly      magnesium oxide (MAG-OX) 400 (240 Mg) MG tablet TAKE 1 TABLET BY MOUTH EVERY DAY      montelukast (SINGULAIR) 10 MG tablet TAKE 1 TABLET BY MOUTH EVERY DAY AT BEDTIME      nitroGLYCERIN (NITROSTAT) 0.4 MG SL tablet Place 0.4 mg under the tongue      polyethylene glycol (GLYCOLAX) 17 GM/SCOOP powder Take 17 g by mouth daily      [DISCONTINUED] metFORMIN (GLUCOPHAGE) 500 MG tablet TAKE TWO TABLETS BY MOUTH 2 TIMES A DAY       No current facility-administered medications on file prior to encounter. REVIEW OF SYSTEMS  The patient has no difficulty with chest pain or shortness of breath. No fever or chills. Comprehensive review of systems was otherwise unremarkable except as noted above. Objective:   Physical Exam:   Recent vitals (if inpt):  Patient Vitals for the past 24 hrs:   BP Pulse Height Weight   12/01/22 1339 130/62 60 -- --   12/01/22 1327 -- -- 6' (1.829 m) 224 lb (101.6 kg)       /62   Pulse 60   Ht 6' (1.829 m)   Wt 224 lb (101.6 kg)   BMI 30.38 kg/m²   Wt Readings from Last 3 Encounters:   12/01/22 224 lb (101.6 kg)   11/14/22 224 lb (101.6 kg)   11/10/22 224 lb (101.6 kg)     Constitutional: Alert, oriented, cooperative patient in no acute distress; appears stated age;  General appearance is within normal limits for wound care patient population. See the today's recorded vitals signs and constitutional data. Eyes: Pupils equal; Sclera are clear. EOMs intact; The eyes appear to track and move normally. The sclera are not injected. The conjunctive are clear. The eyelids are normal. There is no scleral icterus. ENMT: There are no obvious external ear, nose, lip or mouth lesions. Nares normal; Neck: Overall contour of the neck is normal with no obvious neck masses. Gross hearing is within normal limits.  No obvious neck masses  Resp:  Breathing is non-labored; normal rate and effort; no audible wheezing. CV: RRR;  no JVD; No evidence of cyanosis of the upper extremities. The extremities are perfused without embolic sign, splinter hemorrhages, or petechia. GI: soft and non-distended without acute abnormality noted. Musculoskeletal: unremarkable with normal function. No embolic signs or cyanosis. Neuro:  Oriented; moves all 4; no focal deficits  Psychiatric: Judgement and insight are within normal limits for the wound care population of patients. Patient is oriented to person, place, and time. Recent and remote memory are within normal limits. Mood and affect are within normal limits. Integumentary (Skin/Wounds)  See inspection of wound(s) below. There are no other skin areas of palpable concern. See wound center documentation including photos in the [address] Wound Template made part of this record by reference.          Wound Care Documentation:    Wound 09/09/22 Heel Left;Lateral (Active)   Wound Image   12/01/22 1337   Wound Etiology Diabetic Martinez 2 12/01/22 1337   Dressing Status Old drainage noted 12/01/22 1337   Wound Cleansed Cleansed with saline 12/01/22 1337   Dressing/Treatment Betadine swabs/povidone iodine 12/01/22 1337   Offloading for Diabetic Foot Ulcers Offloading boot 12/01/22 1337   Wound Length (cm) 1 cm 12/01/22 1337   Wound Width (cm) 1.8 cm 12/01/22 1337   Wound Depth (cm) 0.2 cm 12/01/22 1337   Wound Surface Area (cm^2) 1.8 cm^2 12/01/22 1337   Change in Wound Size % (l*w) 70 12/01/22 1337   Wound Volume (cm^3) 0.36 cm^3 12/01/22 1337   Wound Healing % 86 12/01/22 1337   Wound Assessment Pink/red;Epithelialization 12/01/22 1337   Drainage Amount Small 12/01/22 1337   Drainage Description Serosanguinous 12/01/22 1337   Odor None 12/01/22 1337   Joanna-wound Assessment Hyperkeratosis (callous) 12/01/22 1337   Margins Attached edges 10/31/22 1115   Wound Thickness Description not for Pressure Injury Full thickness 12/01/22 1337   Number of days: 83       Wound 09/13/22 Buttocks moisture skin damage (Active)   Number of days: 79        Initial WC visit 10/24/2022      F/U on 12/1/2022    Amount and/or Complexity of Data Reviewed and Analyzed:  I reviewed and analyzed all of the unique labs and radiologic studies that are shown below as well as any that are in the HPI, and any that are in the expanded problem list below  *Each unique test, order, or document contributes to the combination of 2 or combination of 3 in Category 1 below. I also independently reviewed radiology images for studies I described above in the HPI or studies I have ordered. For this visit I also reviewed old records and prior notes. No results for input(s): WBC, HGB, PLT, NA, K, CL, CO2, BUN, CREA, GLU, INR, APTT, ALT, AML, AML, LCAD, PCO2, PO2, HCO3 in the last 72 hours. Invalid input(s): PTP, TBIL, TBILI, CBIL, SGOT, GPT, AP, LPSE, NH4, TROPT, TROIQ,  PH  No results for input(s): INR, APTT, ALT, AML in the last 72 hours.     Invalid input(s): PTP, TBIL, TBILI, CBIL, SGOT, GPT, AP, LPSE    Most recent blood counts (CBC) review  Lab Results   Component Value Date    WBC 10.3 10/19/2022    HGB 9.3 (L) 10/28/2022    HCT 32.5 (L) 10/19/2022    MCV 99.7 10/19/2022     10/19/2022     Most recent chemistry (BMP) test review   Lab Results   Component Value Date/Time     10/19/2022 02:22 PM    K 4.0 10/19/2022 02:22 PM     10/19/2022 02:22 PM    CO2 29 10/19/2022 02:22 PM    BUN 15 10/19/2022 02:22 PM    CREATININE 0.80 10/28/2022 11:15 AM    CREATININE 1.20 10/19/2022 02:22 PM    GLUCOSE 156 10/19/2022 02:22 PM    CALCIUM 9.2 10/19/2022 02:22 PM      Most recent Liver enzyme test review  Lab Results   Component Value Date    ALT 78 (H) 10/19/2022    AST 28 10/19/2022    ALKPHOS 116 10/19/2022    BILITOT 0.5 10/19/2022     Most recent coagulation (coags) review  @[facility]@  Lab Results   Component Value Date/Time    INR 1.3 07/30/2022 09:53 AM    APTT 63.6 08/02/2022 05:41 AM    APTT 24.3 03/04/2022 03:57 PM       Diabetic assessment  Hemoglobin A1C   Date Value Ref Range Status   08/10/2022 6.0 (H) 4.8 - 5.6 % Final       Nutritional assessment screen to assess wound healing ability:  Lab Results   Component Value Date    LABALBU 3.2 10/19/2022     No results found for: TP, ALBR, ALB    Xray Result (most recent):  XR PELVIS (1-2 VIEWS) 10/18/2022    Narrative  Pelvis single view 10/18/2022    CLINICAL HISTORY: Fractured lower back. FINDINGS:  2 similar frontal views of the pelvis are submitted for evaluation. No abnormal  widening is seen of the sacroiliac joints or pubic symphysis. No acute displaced  fracture is seen of the pelvic ring. Assessment of the lower lumbar spine and  proximal femurs is limited by the lack of lateral views. Kyphoplasty repair is  seen at the L4 vertebral body level with some vertebral body height loss  adjustment at this level. No clear acute changes of the bilateral hips are seen. Relatively advanced atherosclerotic changes are seen of regional arterial  structures. Impression  1. Kyphoplasty repair at L4. No acute displaced fracture is seen of the bony  pelvis. CT Result (most recent):  CT FNA WITH IMAGING GUIDED 09/14/2022    Narrative  PROCEDURE: CT-guided Large Bore Needle Aspiration and Core Biopsy. Procedural Personnel  Attending physician(s): Rabia Marley M.D. Pre-procedure diagnosis: 76-year-old man with history of vertebral compression  fracture status post kyphoplasty 7/19/2022. Postoperative complications include  probable osteomyelitis and perivertebral abscess/psoas muscle abscess. CT-guided perivertebral fluid aspiration 8/1/2022 returned 2-3 cc of purulent  fluid that demonstrated high white blood cell count but no bacterial growth in  culture. Patient has completed 6 weeks of antibiotic therapy yet continues to  complain of low back pain and hip pain. Patient is currently in an antibiotic  holiday. Repeat MRI 9/6/2022 demonstrates possible small fluid collection  versus phlegmon adjacent to the L4 vertebral body or within the adjacent psoas  muscle. There is multilevel lumbar degenerative disc disease. Eliquis and  Plavix have been held in anticipation of fluid aspiration today. Today, the  patient complains only of hip pain. Post-procedure diagnosis: Same  Indication: Obtain specimen for microbiology evaluation  Previous biopsy of same target (QCDR): Yes    Complications: No immediate complications. Impression  CT-guided Needle aspiration and core biopsy of of the phlegmon  adjacent to the left side of L3-L4 disc and L4 vertebral body. The 1st needle  placement adjacent to the L4 vertebral body returned no fluid, therefore a core  biopsy was obtained. The 2nd needle placement adjacent to the L3-L4 vertebral  body initially returned no fluid, and therefore a few cc of inoculum saline was  administered and then aspirated. Plan:  Specimen(s) sent to the Microbiology department for evaluation. One hour  bedrest.  Resume Plavix tomorrow. Resume Eliquis in 48 hours. _______________________________________________________________  Technique: All CT scans at this facility are performed using dose  reduction/dose modulation techniques, as appropriate to the performed exam,  including the following:  Automated Exposure Control; Adjustment of the MA  and/or kF according to patient size (this includes techniques or standardized  protocols for targeted exams where dose is matched to indication/reason for  exam); and Use of Iterative Reconstruction Technique.     PROCEDURE SUMMARY:  - Percutaneous CT-guided Coaxial Core Needle Biopsy and Fluid Aspiration    PROCEDURE DETAILS:    Pre-procedure  Reference imaging for biopsy target: MRI 9/6/2022, 7/29/2022, 6/2/2022 as well  as CT 7/27/2022    Consent:  Informed written and oral consent for the procedure was obtained after  explanation of risks (including, but not limitted to:  hemorrhage, infection,  visceral injury, nondiagnostic biopsy) benefits and alternatives. The patient's  questions were answered to their satisfaction. They stated understanding and  requested that we proceed. Final verification:  A time-out identifying the patient and planned procedure  was performed prior to this procedure. Preparation: (MIPS) Maximal sterile barrier technique (including:  cap, mask,  sterile gown, sterile gloves, sterile sheet, hand hygiene, and cutaneous  antisepsis) was used. Anesthesia/sedation  Level of anesthesia/sedation: Moderate sedation (conscious sedation)  Anesthesia/sedation administered by: Independent trained observer under  attending supervision with continuous monitoring of the patient?s level of  consciousness and physiologic status  Total intra-service sedation time (minutes): 27    Imaging prior to biopsy  The patient was positioned prone. Initial imaging was performed using  noncontrast CT. Biopsy target:  - Maximal diameter (cm): 0.7 -  Location: soft tissue density to the left of the L4 vertebral body. Other findings: Less distinct soft tissue density to the left of the L3-L4 disc  space. Biopsy  Local anesthesia was administered. Under CT guidance, the coaxial biopsy system  was advanced to the target and aspiration was attempted but no fluid was  returned. Therefore, through the coaxial needle, an 18-gauge core biopsy was  obtained and then placed into a sterile container. Coaxial needle: 17 gauge  Core needle biopsy device: [place]  Core needle size: 18 gauge  Number of core specimens: 1    Attention was then turned to the subtle soft tissue density adjacent to the  L3-L4 disc. The coaxial needle was reassembled and then redirected using  intermittent CT scan imaging to this 2nd soft tissue density. The 17-gauge  coaxial needle was aspirated, returning no fluid.   Therefore, 3 cc of inoculum  saline was administered and then aspirated as specimen. Large bore needle aspiration device: [employer]  Fine needle size: 17-gauge  Number of FNA specimens: 1    Needle removal  The biopsy needle was removed and a sterile dressing was applied. Tract embolization: None    Imaging following biopsy  Immediate post-biopsy imaging was not performed. Imaging modality: Not applicable. Post-biopsy imaging findings: Not applicable    Contrast  Contrast agent: None  Contrast volume (mL): 0    Radiation Dose  CT dose length product (mGy-cm):  957.09    Additional Details  Additional description of procedure: None  Equipment details: None  Specimens removed: Microbiology  Estimated blood loss (mL): Less than 10  Standardized report: [email or web address]    Attestation  Signer name: Spike Deng M.D. I attest that I personally performed the entire procedure. I reviewed the stored  images and agree with the report as written. 07/06/22    VAS DUP LOWER EXT ARTERIES BILATERAL W JOVANA 07/07/2022  7:57 AM (Final)    Interpretation Summary    Right lower extremity: The JOVANA (ankle-brachial index) is 0.78 and is abnormal. The lower extremity arterial duplex reveals an occlusion of the proximal superficial femoral artery with reconstitution at the distal proximal superficial femoral artery. There is monophasic flow in the GILBERTO, PTA and peroneal arteries at the ankle. The great toe pressure is 64mmHg. Left lower extremity: Right lower extremity: The JOVANA (ankle-brachial index) is 0.60 and is abnormal. The lower extremity arterial duplex reveals an occlusion of the proximal superficial femoral artery with reconstitution at the below knee popliteal artery. There is monophasic flow in the GILBERTO, PTA and peroneal arteries at the ankle. The great toe pressure is 63mmHg. The abdominal aorta was evaluated and measured 2.8cm x 2.9cm at its maximum diameter, no aneurysm visualized on limited evaluation of abdominal aorta.     Signed by: Nayely Hyde MD on 7/7/2022  7:57 AM        Admission date (for inpatients): 12/1/2022   Day of Surgery  * No procedures listed *    Assessment:      Problem List Items Addressed This Visit          Circulatory    Arterial degeneration    Atherosclerosis of native arteries of extremity with intermittent claudication (HCC)    PAD (peripheral artery disease) ([facility]    Relevant Orders    Ambulatory Referral to [facility]    DM (diabetes mellitus) type II, controlled, with peripheral vascular disorder ([facility] [facility]    Relevant Orders    Ambulatory Referral to [facility]       Endocrine    [facility]    Relevant Orders    Ambulatory Referral to [facility]       Other    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 9) with comorbidity ([facility]       [unfilled]    Active Problems:    * No active hospital problems. *  Resolved Problems:    * No resolved hospital problems.  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 ([facility] 10/24/2022     Priority: High    Antiplatelet or antithrombotic long-term use 10/24/2022     Priority: High     Eliquis and Plavix      Chest pain 12/05/2018     Priority: High    Lumbar discitis 09/08/2022     Priority: Medium    Psoas muscle abscess ([facility] 09/08/2022     Priority: Medium    Physical debility 08/26/2022     Priority: Medium    General weakness 07/27/2022     Priority: Medium    Acute cystitis without hematuria 07/27/2022     Priority: Medium    Low back pain without sciatica 07/27/2022     Priority: Medium    Back pain 07/27/2022     Priority: Medium    DNI (do not intubate) 07/27/2022     Priority: Medium    Elevated liver enzymes 07/27/2022     Priority: Medium    Anemia 07/27/2022     Priority: Medium    Hyponatremia 07/27/2022     Priority: Medium    ICD (implantable cardioverter-defibrillator) in place 06/16/2022     Priority: Medium    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, vertebra(e), init ([facility] 07/07/2022     Priority: Low     Added automatically from request for surgery [id number]      Atrial fibrillation ([facility] 03/04/2022     Priority: Low    Ventricular tachycardia 03/04/2022     Priority: Low    VT (ventricular tachycardia) 03/04/2022     Priority: Low    Acute metabolic encephalopathy 37/85/6412     Priority: Low    Irregular heart beat 03/02/2021     Priority: Low    PVC's (premature ventricular contractions) 03/02/2021     Priority: Low    Elevated troponin 12/31/2020     Priority: Low    Toe laceration 12/09/2019     Priority: Low    Type 2 diabetes with nephropathy ([facility] 06/17/2019     Priority: Low    Hypoxia 02/08/2019     Priority: Low    Sepsis ([facility] 02/08/2019     Priority: Low    Abnormal nuclear cardiac imaging test 11/27/2018     Priority: Low    Cigarette nicotine dependence in remission 08/20/2018     Priority: Low    Chronic pain of right knee 12/14/2017     Priority: Low    Obstructive sleep apnea 08/11/2017     Priority: Low    Intermittent spinal claudication ([facility] 06/13/2017     Priority: Low    Pulmonary emphysema ([facility] 11/17/2016     Priority: Low    H/O endarterectomy 11/10/2015     Priority: Low    Complicated wound infection 11/10/2015     Priority: Low    Allergic rhinitis 11/10/2015     Priority: Low    Hiatal hernia 11/10/2015     Priority: Low    Diabetic neuropathy ([facility] 11/10/2015     Priority: Low    Osteoarthritis 11/10/2015     Priority: Low    Encounter for long-term (current) drug use 11/10/2015     Priority: Low    Benign neoplasm of colon 11/10/2015     Priority: Low    PAD (peripheral artery disease) ([facility] 11/10/2015     Priority: Low    Asthma 11/10/2015     Priority: Low    Orthostatic hypotension 11/10/2015     Priority: Low    Hyperlipidemia 11/10/2015     Priority: Low    S/P angioplasty with stent Antiplatelet or antithrombotic long-term use     Patient presents to the wound center for initial visit on 10/24/2022. He has had an ulcer on the left heel for several months. He has been debilitated following back surgery but is also debilitated from multiple medical problems including cardiac disease, peripheral vascular disease, and diabetes. He is anticoagulated on Eliquis and Plavix. He has had femoral endarterectomies in the past and is now followed by Dr. Quinton Rizzo. JOVANA in July was abnormal and has not been reevaluated since deterioration of his tissue. He is scheduled for repeat JOVANA in January and we will consult vascular surgery for earlier evaluation. Vascular consultation to follow as needed. He has not been offloading despite having work boots. There is clearly a pressure component. There is no mirror lesion on the right heel and may reflect his discrepancy in blood supply. He does describe some episodes that could be rest pain though it does not drop his legs for improvement. This may also be limited by his debility. We will not do debridement today. We will dress with Betadine and an absorbent pad and follow-up in 1 week. Offloading was emphasized. He returns on 10/31/2022. He underwent arteriography but is not a candidate for percutaneous intervention. He is being set up for formal bypass in November. Therapy is having him walk on his foot and offloading was again stressed. Current dressing remains Betadine. I will see him back in 1.5 weeks which will be before his bypass surgery. He returns on 11/10/2022. He is improved and has completed antibiotics. Current dressing is Betadine. He will see vascular surgery on Monday to schedule bypass surgery. He returns on 12/1/2022. He was seen by vascular surgery who have set him up for femoral to distal bypass in January, but feel it may not be necessary as he continues to heal.  This is probably secondary to better offloading.   He was combination of 2 from the following:  ?Review of prior external note(s) from each unique source*;  ?review of the result(s) of each unique test*;   ?ordering of each unique test*    or   Category 2: Assessment requiring an independent historian(s)  (For the categories of independent interpretation of tests and discussion of management or test interpretation, see moderate or high) Low risk of morbidity from additional diagnostic testing or treatment     34365  30031 Mod Moderate  ? 1or more chronic illnesses with exacerbation, progression, or side effects of treatment;    or  ?2or more stable chronic illnesses;    or  ?1undiagnosed new problem with uncertain prognosis;    or  ?1acute illness with systemic symptoms;    or  ?1acute complicated injury   Moderate  (Must meet the requirements of at least 1 out of 3 categories)  Category 1: Tests, documents, or independent historian(s)  ? Any combination of 3 from the following:   ?Review of prior external note(s) from each unique source*;  ?Review of the result(s) of each unique test*;  ?Ordering of each unique test*;  ?Assessment requiring an independent historian(s)    or  Category 2: Independent interpretation of tests   ? Independent interpretation of a test performed by another physician/other qualified health care professional (not separately reported);     or  Category 3: Discussion of management or test interpretation  ? Discussion of management or test interpretation with external physician/other qualified health care professional/appropriate source (not separately reported)   Moderate risk of morbidity from additional diagnostic testing or treatment  Examples only:  ?Prescription drug management - advanced wound care prescription Rx wound care products  (eg Iodosorb, hydrofera, silvadene, collagen, puracol plus, optiloc, biologics - placenta, Theraskin, Apligraf).   Note also that if home health care is involved, they will not apply topical therapies without a prescription/order from physician      ? Decision regarding minor surgery with identified patient or procedure risk factors  ? Decision regarding elective major surgery without identified patient or procedure risk factors   ? Diagnosis or treatment significantly limited by social determinants of health       63383  39431 High High  ? 1or more chronic illnesses with severe exacerbation, progression, or side effects of treatment;    or  ?1 acute or chronic illness or injury that poses a threat to life or bodily function   Extensive  (Must meet the requirements of at least 2 out of 3 categories)  Category 1: Tests, documents, or independent historian(s)  ? Any combination of 3 from the following:   ?Review of prior external note(s) from each unique source*;  ?Review of the result(s) of each unique test*;   ?Ordering of each unique test*;   ?Assessment requiring an independent historian(s)    or   Category 2: Independent interpretation of tests   ? Independent interpretation of a test performed by another physician/other qualified health care professional (not separately reported);     or  Category 3: Discussion of management or test interpretation  ? Discussion of management or test interpretation with external physician/other qualified health care professional/appropriate source (not separately reported)   High risk of morbidity from additional diagnostic testing or treatment  Examples only:  ?Drug therapy requiring intensive monitoring for toxicity  ? Decision regarding elective major surgery with identified patient or procedure risk factors  ? Decision regarding emergency major surgery  ? Decision regarding hospitalization  ? Decision not to resuscitate or to de-escalate care because of poor prognosis                 I have personally performed a face-to-face diagnostic evaluation and management  service on this patient. I have independently seen the patient.    I have independently obtained the above history from the patient/family. I have independently examined the patient with above findings. I have independently reviewed data/labs for this patient and developed the above plan of care (MDM).       Electronically signed by Srinivasa Bradford MD on 12/1/2022 at 5:31 PM

## 2022-12-01 NOTE — WOUND CARE
Discharge Instructions for  Windy Fairchild  [facility]  [place] [address], [address]  Phone [phone]   Fax [phone]      NAME:  Reji Whitaker OF BIRTH:  1945  MEDICAL RECORD NUMBER:  [id number]  DATE:  12/1/2022    Return Appointment:   1 week with Neeta Mcclendon MD      Instructions: Left Lateral Heel:  Cleanse with Normal Saline  Apply collagen with silver. Cut product to approximate wound size and apply to wound bed. Cover with Bandaid/Bordered Foam or ABD and wrap with Rolled Gauze. Delay vascular surgery if possible  Order for puraply possible placement at next visit     Float Heel When Patient in [facility]  When in [address] and When Heel is Unable to be [employer]           Should you experience increased redness, swelling, pain, foul odor, size of wound(s), or have a temperature over 101 degrees please contact the [address] [place] [address] Road at [phone] or if after hours contact your primary care physician or go to the hospital emergency department. PLEASE NOTE: IF YOU ARE UNABLE TO OBTAIN WOUND SUPPLIES, CONTINUE TO USE THE SUPPLIES YOU HAVE AVAILABLE UNTIL YOU ARE ABLE TO REACH US. IT IS MOST IMPORTANT TO KEEP THE WOUND COVERED AT ALL TIMES.     Electronically signed Meryl Pierre RN on 12/1/2022 at 2:16 PM

## 2022-12-05 ENCOUNTER — OFFICE VISIT (OUTPATIENT)
Dept: INTERNAL MEDICINE CLINIC | Facility: CLINIC | Age: 77
End: 2022-12-05
Payer: MEDICARE

## 2022-12-05 ENCOUNTER — TELEPHONE (OUTPATIENT)
Dept: INTERNAL MEDICINE CLINIC | Facility: CLINIC | Age: 77
End: 2022-12-05

## 2022-12-05 VITALS
DIASTOLIC BLOOD PRESSURE: 59 MMHG | HEIGHT: 72 IN | RESPIRATION RATE: 17 BRPM | WEIGHT: 224 LBS | OXYGEN SATURATION: 96 % | BODY MASS INDEX: 30.34 KG/M2 | HEART RATE: 60 BPM | TEMPERATURE: 97.2 F | SYSTOLIC BLOOD PRESSURE: 116 MMHG

## 2022-12-05 DIAGNOSIS — R74.01 ELEVATED ALT MEASUREMENT: Primary | ICD-10-CM

## 2022-12-05 DIAGNOSIS — I70.213 ATHEROSCLEROSIS OF NATIVE ARTERY OF BOTH LOWER EXTREMITIES WITH INTERMITTENT CLAUDICATION (HCC): ICD-10-CM

## 2022-12-05 DIAGNOSIS — G45.3 AMAUROSIS FUGAX OF LEFT EYE: ICD-10-CM

## 2022-12-05 DIAGNOSIS — L97.422 NON-PRESSURE CHRONIC ULCER OF LEFT HEEL AND MIDFOOT WITH FAT LAYER EXPOSED (HCC): ICD-10-CM

## 2022-12-05 DIAGNOSIS — I73.9 PAD (PERIPHERAL ARTERY DISEASE) (HCC): ICD-10-CM

## 2022-12-05 LAB — ALT SERPL-CCNC: 31 U/L (ref 12–65)

## 2022-12-05 PROCEDURE — 4004F PT TOBACCO SCREEN RCVD TLK: CPT | Performed by: NURSE PRACTITIONER

## 2022-12-05 PROCEDURE — 3078F DIAST BP <80 MM HG: CPT | Performed by: NURSE PRACTITIONER

## 2022-12-05 PROCEDURE — 99214 OFFICE O/P EST MOD 30 MIN: CPT | Performed by: NURSE PRACTITIONER

## 2022-12-05 PROCEDURE — G8427 DOCREV CUR MEDS BY ELIG CLIN: HCPCS | Performed by: NURSE PRACTITIONER

## 2022-12-05 PROCEDURE — G8484 FLU IMMUNIZE NO ADMIN: HCPCS | Performed by: NURSE PRACTITIONER

## 2022-12-05 PROCEDURE — G8417 CALC BMI ABV UP PARAM F/U: HCPCS | Performed by: NURSE PRACTITIONER

## 2022-12-05 PROCEDURE — 1123F ACP DISCUSS/DSCN MKR DOCD: CPT | Performed by: NURSE PRACTITIONER

## 2022-12-05 PROCEDURE — 3074F SYST BP LT 130 MM HG: CPT | Performed by: NURSE PRACTITIONER

## 2022-12-05 ASSESSMENT — ENCOUNTER SYMPTOMS: SHORTNESS OF BREATH: 0

## 2022-12-05 NOTE — PROGRESS NOTES
12/5/2022 11:07 AM  Location:[facility] [address] [facility]  [place]  Patient #:  [id number]  YOB: 1945          YOUR LAST HEMOGLOBIN A1CS:   No results found for: HBA1C, [id number]    YOUR LAST LIPID PROFILE:   Lab Results   Component Value Date/Time    CHOL 77 03/05/2022 06:14 AM    HDL 45 03/05/2022 06:14 AM    VLDL 18 09/21/2021 02:54 PM         Lab Results   Component Value Date/Time    GFRAA >60 09/16/2022 04:42 AM    BUN 15 10/19/2022 02:22 PM     10/19/2022 02:22 PM    K 4.0 10/19/2022 02:22 PM     10/19/2022 02:22 PM    CO2 29 10/19/2022 02:22 PM           History of Present Illness     Chief Complaint   Patient presents with    Wound Check     One month wound check. Heel. Much better. Mr. Isabelle Jones is a 65 y.o. male  who presents for 1 month follow up. Mr. Sg Camp is a 1 month follow-up for complicated left heel wound which grew Proteus vulgaris, Pseudomonas and Klebsiella pneumonia prescribed doxycycline and Cipro and being followed by infectious disease. He also has a history of lumbar discitis, osteomyelitis and has a history of Bilateral femoral endarterectomy. Initially when he was referred to wound care he was sent to vascular for critical limb ischemia. Notes that after seeing the infectious disease doctor, they have canceled his upcoming femoral-popliteal bypass as his symptoms are improving with wound care. He is being treated with silver on his heel wound which has improved. He is off his antibiotics. They do plan to follow-up with vascular and he sees them annually. His wife also notes that since being seen last month he has had an [facility] stroke\". He is following up with [facility] and sees them tomorrow. We note his left pupil which is nonreactive. His wife notes that this is not new. He has decreased vision in that eye. There are no other deficits.   He reports that he is eating and drinking well, having normal bowel movements, denies any new leg swelling, leg pain, chest pain, shortness of breath or abdominal pain. Recently his ALT level was elevated and we decreased his amiodarone. He needs follow-up to recheck this.          Allergies   Allergen Reactions    Acetaminophen Hives     Per spouse has small amount of hives, takes 1/2 and tolerates     Azithromycin Hives    Morphine Hallucinations     Muscle twitching. jerking    Oxaprozin Other (See Comments)     ELEVATED BLOOD PRESSURE    Oxycodone Anxiety     Past Medical History:   Diagnosis Date    Acute respiratory failure with hypoxia ([address] 10/23/2014    MINI (acute kidney injury) ([address] 09/15/2014    MINI (acute kidney injury) ([address]     MINI (acute kidney injury) ([address]     Allergic rhinitis 11/10/2015    Asthma 11/10/2015    Benign essential hypertension 11/10/2015    Benign neoplasm of colon 11/10/2015    CAD (coronary artery disease) 11/10/2015    CAD (coronary artery disease) 1998, 1999    mix2, 3 stents [id number]    CAP (community acquired pneumonia) 12/31/2020    Cardiomyopathy ([address] 28/49/8079    Complicated wound infection 11/10/2015    COPD (chronic obstructive pulmonary disease) with emphysema ([address] 11/10/2015    COPD exacerbation ([address] 10/12/2011    COVID-19 12/31/2020    Diabetes mellitus type 2, controlled ([address] 11/10/2015    doesn/t check daily oral meds, A1c 6.0 (8/10/22)    Diabetic neuropathy ([address] 11/10/2015    Disruption of wound, unspecified 10/09/2014    Encounter for long-term (current) use of other high-risk medications 11/10/2015    Extremity atherosclerosis with intermittent claudication ([address] 11/10/2015    H/O endarterectomy 11/10/2015    Hiatal hernia 11/10/2015    History of 2019 novel coronavirus disease (COVID-19)     12/31/2020- hospitalized    Hyperglycemia due to type 1 diabetes mellitus ([address] 11/10/2015    Hyperlipidemia 11/10/2015    ICD (implantable cardioverter-defibrillator) in place 06/16/2022    Monomorphic ventricular tachycardia 12/31/2020 Myocardial infarction ([facility]     Myocardial infarction ([facility] 11/10/2015    Obesity 11/10/2015    Orthostatic hypotension 11/10/2015    Osteoarthritis 11/10/2015    Peripheral vascular disease ([facility] 11/10/2015    PNA (pneumonia) 2019    PVC (premature ventricular contraction)     Rash     Seroma complicating a procedure 10/02/2014    Sleep apnea     cpap    Toe laceration     Type 2 diabetes with nephropathy ([facility]     Uncontrolled type 2 diabetes mellitus 11/10/2015    Vertiginous syndromes and other disorders of vestibular system 11/10/2015     Social History     Socioeconomic History    Marital status:      Spouse name: None    Number of children: None    Years of education: None    Highest education level: None   Tobacco Use    Smoking status: Former     Packs/day: 1.00     Years: 50.00     Pack years: 50.00     Types: Cigarettes     Quit date: 10/2/2011     Years since quittin.1    Smokeless tobacco: Current     Types: Chew    Tobacco comments:     Chews tobacco daily   Vaping Use    Vaping Use: Never used   Substance and Sexual Activity    Alcohol use: Yes     Alcohol/week: 0.0 standard drinks    Drug use: No   Social History Narrative    Lives with wife     Social Determinants of Health     Financial Resource Strain: Unknown    Difficulty of Paying Living Expenses: Patient refused   Food Insecurity: Unknown    Worried About Running Out of Food in the Last Year: Patient refused    Ran Out of Food in the Last Year: Patient refused     Past Surgical History:   Procedure Laterality Date    CARDIAC CATHETERIZATION  2018    LV EF=40%. LM:nl. LAD:30-40% mid stenosis. LCX OM1:95% stenosis. RCA:100% occlusion at RV branch.     CATARACT REMOVAL Right 2022    CORONARY ANGIOPLASTY WITH STENT PLACEMENT  2018    LCX OM1:2.5 x 12 [place] RAKESH    CORONARY ANGIOPLASTY WITH STENT PLACEMENT      NM ABDOMEN SURGERY PROC UNLISTED      abdominal cyst removed    NM CARDIAC SURG PROCEDURE UNLIST  1999    stents x3    SPINE SURGERY N/A 07/19/2022    LUMBAR 2, LUMBAR 4 KYPHOPLASTY AND ANY OTHER INDICATED LEVELS performed by Emili Shafer III, MD at [address] [place] [address]  11/5/14    Repair of right common femoral artery     VASCULAR SURGERY  9/11/14    tiffanie femoral endarterectomy    VASCULAR SURGERY Left 10/28/2022    LEFT LOWER EXTREMITY ARTERIOGRAM / ULTRASOUND GUIDED ACCESS   performed by Zahraa Hayward MD at [facility] MAIN OR     Current Outpatient Medications   Medication Sig Dispense Refill    valsartan (DIOVAN) 80 MG tablet Take 1 tablet by mouth daily 30 tablet 5    CPAP Machine MISC by Does not apply route      albuterol sulfate HFA (PROVENTIL;VENTOLIN;PROAIR) 108 (90 Base) MCG/ACT inhaler USE 2 PUFFS BY INHALATION 4 TIMES DAILY AS NEEDED FOR WHEEZING OR SHORTNESS OF BREATH. Patient prefers ventolin. 18 g 11    GUAIFENESIN 1200 PO Take 1 tablet by mouth every 12 hours as needed      tamsulosin (FLOMAX) 0.4 MG capsule Take 0.4 mg by mouth daily      traMADol (ULTRAM) 50 MG tablet Take 50 mg by mouth every 6 hours as needed for Pain. Taking 1/2 every morning      acetaminophen (TYLENOL) 500 MG tablet Take 500 mg by mouth every 6 hours as needed for Pain Taking 1/2 two times daily      glipiZIDE (GLUCOTROL XL) 10 MG extended release tablet Take 1 tablet by mouth daily 30 tablet 0    furosemide (LASIX) 20 MG tablet Take 1 tablet by mouth in the morning.  (Patient taking differently: Take 20 mg by mouth daily 1/2 tablet) 60 tablet 3    rosuvastatin (CRESTOR) 10 MG tablet TAKE 1 TABLET BY MOUTH EVERY DAY (Patient taking differently: at bedtime) 90 tablet 0    fluticasone-salmeterol (ADVAIR) 250-50 MCG/ACT AEPB diskus inhaler Inhale 1 puff into the lungs in the morning and at bedtime      amiodarone (CORDARONE) 200 MG tablet Take 200 mg by mouth daily      apixaban (ELIQUIS) 5 MG TABS tablet Take 5 mg by mouth every 12 hours      ascorbic acid (VITAMIN C) 500 MG tablet Take 500 mg by mouth      carvedilol (COREG) 25 MG tablet Take 25 mg by mouth 2 times daily (with meals)      Cholecalciferol 50 MCG (2000 UT) TABS Take 2,000 Units by mouth      clopidogrel (PLAVIX) 75 MG tablet Take 75 mg by mouth daily      fluticasone (FLONASE) 50 MCG/ACT nasal spray USE 1 OR 2 SPRAYS IN EACH NOSTRIL EVERY DAY. latanoprost (XALATAN) 0.005 % ophthalmic solution Apply 1 drop to eye nightly      magnesium oxide (MAG-OX) 400 (240 Mg) MG tablet TAKE 1 TABLET BY MOUTH EVERY DAY      montelukast (SINGULAIR) 10 MG tablet TAKE 1 TABLET BY MOUTH EVERY DAY AT BEDTIME      nitroGLYCERIN (NITROSTAT) 0.4 MG SL tablet Place 0.4 mg under the tongue      polyethylene glycol (GLYCOLAX) 17 GM/SCOOP powder Take 17 g by mouth daily       No current facility-administered medications for this visit.      Health Maintenance   Topic Date Due    DTaP/Tdap/Td vaccine (1 - Tdap) Never done    Low dose CT lung screening  Never done    Pneumococcal 65+ years Vaccine (3 - PPSV23 if available, else PCV20) 10/17/2016    COVID-19 Vaccine (3 - Booster for Moderna series) 06/10/2021    Diabetic retinal exam  05/18/2022    Flu vaccine (1) 08/01/2022    Diabetic foot exam  09/21/2022    A1C test (Diabetic or Prediabetic)  02/10/2023    Lipids  03/05/2023    Annual Wellness Visit (AWV)  03/24/2023    Prostate Specific Antigen (PSA) Screening or Monitoring  05/18/2023    Depression Screen  11/14/2023    Shingles vaccine  Completed    Hepatitis C screen  Completed    Hepatitis A vaccine  Aged Out    Hib vaccine  Aged Out    Meningococcal (ACWY) vaccine  Aged Out     Family History   Problem Relation Age of Onset    Breast Cancer Mother     Hypertension Mother     Other Father         DIVERTICULITIS    Crohn's Disease Sister     Lung Disease Brother         mesothioloma    Diabetes Sister     Heart Attack Father     Heart Disease Father     Stroke Mother              Review of Systems  Review of Systems   Constitutional:  Negative for chills and fever. Eyes:  Positive for visual disturbance (for the past month, had an \"eye bleed\"). Respiratory:  Negative for shortness of breath. Cardiovascular:  Negative for chest pain, palpitations and leg swelling. Musculoskeletal:  Positive for gait problem. Skin:  Positive for wound. All other systems reviewed and are negative. BP (!) 116/59 (Site: Left Upper Arm, Position: Sitting, Cuff Size: Large Adult)   Pulse 60   Temp 97.2 °F (36.2 °C) (Skin)   Resp 17   Ht 6' (1.829 m)   Wt 224 lb (101.6 kg)   SpO2 96%   BMI 30.38 kg/m²       Physical Exam    Physical Exam  Vitals and nursing note reviewed. Constitutional:       General: He is not in acute distress. Appearance: He is not ill-appearing, toxic-appearing or diaphoretic. Eyes:      Extraocular Movements: Extraocular movements intact. Conjunctiva/sclera: Conjunctivae normal.      Pupils:      Right eye: Pupil is reactive and not sluggish. Left eye: Pupil is not reactive. Visual Fields: Right eye visual fields normal.      Comments: Decreased field of vision left sided   Neck:      Vascular: No carotid bruit. Cardiovascular:      Rate and Rhythm: Regular rhythm. Heart sounds: No murmur heard. Pulmonary:      Effort: No respiratory distress. Breath sounds: No stridor. No wheezing or rales. Musculoskeletal:      Right lower leg: No edema. Left lower leg: No edema. Neurological:      Mental Status: He is oriented to person, place, and time. GCS: GCS eye subscore is 4. GCS verbal subscore is 5. GCS motor subscore is 6. Cranial Nerves: Cranial nerves 2-12 are intact. Sensory: Sensation is intact. Motor: No pronator drift.       Comments: BLE 4/5  BUE 5/5         Assessment & Plan    Current Outpatient Medications   Medication Sig Dispense Refill    valsartan (DIOVAN) 80 MG tablet Take 1 tablet by mouth daily 30 tablet 5    CPAP Machine MISC by Does not apply route      albuterol sulfate HFA (PROVENTIL;VENTOLIN;PROAIR) 108 (90 Base) MCG/ACT inhaler USE 2 PUFFS BY INHALATION 4 TIMES DAILY AS NEEDED FOR WHEEZING OR SHORTNESS OF BREATH. Patient prefers ventolin. 18 g 11    GUAIFENESIN 1200 PO Take 1 tablet by mouth every 12 hours as needed      tamsulosin (FLOMAX) 0.4 MG capsule Take 0.4 mg by mouth daily      traMADol (ULTRAM) 50 MG tablet Take 50 mg by mouth every 6 hours as needed for Pain. Taking 1/2 every morning      acetaminophen (TYLENOL) 500 MG tablet Take 500 mg by mouth every 6 hours as needed for Pain Taking 1/2 two times daily      glipiZIDE (GLUCOTROL XL) 10 MG extended release tablet Take 1 tablet by mouth daily 30 tablet 0    furosemide (LASIX) 20 MG tablet Take 1 tablet by mouth in the morning. (Patient taking differently: Take 20 mg by mouth daily 1/2 tablet) 60 tablet 3    rosuvastatin (CRESTOR) 10 MG tablet TAKE 1 TABLET BY MOUTH EVERY DAY (Patient taking differently: at bedtime) 90 tablet 0    fluticasone-salmeterol (ADVAIR) 250-50 MCG/ACT AEPB diskus inhaler Inhale 1 puff into the lungs in the morning and at bedtime      amiodarone (CORDARONE) 200 MG tablet Take 200 mg by mouth daily      apixaban (ELIQUIS) 5 MG TABS tablet Take 5 mg by mouth every 12 hours      ascorbic acid (VITAMIN C) 500 MG tablet Take 500 mg by mouth      carvedilol (COREG) 25 MG tablet Take 25 mg by mouth 2 times daily (with meals)      Cholecalciferol 50 MCG (2000 UT) TABS Take 2,000 Units by mouth      clopidogrel (PLAVIX) 75 MG tablet Take 75 mg by mouth daily      fluticasone (FLONASE) 50 MCG/ACT nasal spray USE 1 OR 2 SPRAYS IN EACH NOSTRIL EVERY DAY.       latanoprost (XALATAN) 0.005 % ophthalmic solution Apply 1 drop to eye nightly      magnesium oxide (MAG-OX) 400 (240 Mg) MG tablet TAKE 1 TABLET BY MOUTH EVERY DAY      montelukast (SINGULAIR) 10 MG tablet TAKE 1 TABLET BY MOUTH EVERY DAY AT BEDTIME      nitroGLYCERIN (NITROSTAT) 0.4 MG SL tablet Place 0.4 mg under the tongue      polyethylene glycol (GLYCOLAX) 17 GM/SCOOP powder Take 17 g by mouth daily       No current facility-administered medications for this visit. 1. Elevated ALT measurement  His ALT was elevated, recently his amiodarone was decreased, will recheck his ALT today. - ALT; Future  - ALT    2. Amaurosis fugax of left eye  Reports he suffered an [place] stroke\" last month, is being followed by ophthalmology. He does have a nonreactive left pupil, his wife will contact ophthalmology today to make sure this is not a new finding. Reviewed imaging, had an MRI in July without any acute findings. We will update his carotid ultrasounds. Discussed this with Dr. Isaura Posada who agrees with plan of care. Knows to present to the emergency department for acute further vision changes, focal motor weakness, speech troubles. He will maintain close follow-up with ophthalmology. - Vascular duplex carotid bilateral; Future    3. 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 Atherosclerosis of native artery of both lower extremities with intermittent claudication ([address]  Followed by vascular, recently he and his wife have decided to put off the femoral popliteal bypass surgery. They will maintain close follow-up with vascular for monitoring.     5. PAD (peripheral artery disease) ([facility])        Jennifer Nance NP, APRN - CNP

## 2022-12-05 NOTE — TELEPHONE ENCOUNTER
Please let Mr. and . Yanet know that I spoke with Dr. Sp Nj about his recent \"eye stroke\". We want to make sure his carotid ultrasounds studies are up-to-date and I have ordered new studies. In addition, please make sure he follows up with the ophthalmologist to get any further imaging such as an MRI of his brain. Please let us know if he has any new or worsening symptoms.   Janay

## 2022-12-06 NOTE — TELEPHONE ENCOUNTER
Please let Mr. and . Yanet know that I spoke with Dr. Vikram Cobian about his recent \"eye stroke\". We want to make sure his carotid ultrasounds studies are up-to-date and I have ordered new studies. In addition, please make sure he follows up with the ophthalmologist to get any further imaging such as an MRI of his brain. Please let us know if he has any new or worsening symptoms. His lab work showed a normal ALT level, so continue current Amiodarone dose.      Janay

## 2022-12-08 ENCOUNTER — HOSPITAL ENCOUNTER (OUTPATIENT)
Dept: WOUND CARE | Age: 77
Discharge: HOME | End: 2022-12-08
Payer: MEDICARE

## 2022-12-08 VITALS
HEART RATE: 60 BPM | SYSTOLIC BLOOD PRESSURE: 135 MMHG | BODY MASS INDEX: 30.34 KG/M2 | RESPIRATION RATE: 18 BRPM | TEMPERATURE: 98.3 F | WEIGHT: 224 LBS | OXYGEN SATURATION: 97 % | HEIGHT: 72 IN | DIASTOLIC BLOOD PRESSURE: 70 MMHG

## 2022-12-08 DIAGNOSIS — Z79.02 ANTIPLATELET OR ANTITHROMBOTIC LONG-TERM USE: ICD-10-CM

## 2022-12-08 DIAGNOSIS — E11.51 DM (DIABETES MELLITUS) TYPE II, CONTROLLED, WITH PERIPHERAL VASCULAR DISORDER (HCC): ICD-10-CM

## 2022-12-08 DIAGNOSIS — R53.81 PHYSICAL DEBILITY: ICD-10-CM

## 2022-12-08 DIAGNOSIS — I73.9 PAD (PERIPHERAL ARTERY DISEASE) (HCC): ICD-10-CM

## 2022-12-08 DIAGNOSIS — I70.90: ICD-10-CM

## 2022-12-08 DIAGNOSIS — L97.422 NON-PRESSURE CHRONIC ULCER OF LEFT HEEL AND MIDFOOT WITH FAT LAYER EXPOSED (HCC): Primary | ICD-10-CM

## 2022-12-08 DIAGNOSIS — E11.42 DIABETIC POLYNEUROPATHY ASSOCIATED WITH TYPE 2 DIABETES MELLITUS (HCC): ICD-10-CM

## 2022-12-08 DIAGNOSIS — Z98.890 H/O ENDARTERECTOMY: ICD-10-CM

## 2022-12-08 PROCEDURE — 15275 SKIN SUB GRAFT FACE/NK/HF/G: CPT

## 2022-12-08 RX ORDER — LIDOCAINE HYDROCHLORIDE 20 MG/ML
JELLY TOPICAL ONCE
OUTPATIENT
Start: 2022-12-08 | End: 2022-12-08

## 2022-12-08 RX ORDER — LIDOCAINE HYDROCHLORIDE 40 MG/ML
SOLUTION TOPICAL ONCE
OUTPATIENT
Start: 2022-12-08 | End: 2022-12-08

## 2022-12-08 RX ORDER — LIDOCAINE 50 MG/G
OINTMENT TOPICAL ONCE
OUTPATIENT
Start: 2022-12-08 | End: 2022-12-08

## 2022-12-08 RX ORDER — GENTAMICIN SULFATE 1 MG/G
OINTMENT TOPICAL ONCE
OUTPATIENT
Start: 2022-12-08 | End: 2022-12-08

## 2022-12-08 RX ORDER — BACITRACIN ZINC AND POLYMYXIN B SULFATE 500; 1000 [USP'U]/G; [USP'U]/G
OINTMENT TOPICAL ONCE
OUTPATIENT
Start: 2022-12-08 | End: 2022-12-08

## 2022-12-08 RX ORDER — BACITRACIN, NEOMYCIN, POLYMYXIN B 400; 3.5; 5 [USP'U]/G; MG/G; [USP'U]/G
OINTMENT TOPICAL ONCE
OUTPATIENT
Start: 2022-12-08 | End: 2022-12-08

## 2022-12-08 RX ORDER — LIDOCAINE 40 MG/G
CREAM TOPICAL ONCE
OUTPATIENT
Start: 2022-12-08 | End: 2022-12-08

## 2022-12-08 RX ORDER — GINSENG 100 MG
CAPSULE ORAL ONCE
OUTPATIENT
Start: 2022-12-08 | End: 2022-12-08

## 2022-12-08 RX ORDER — BETAMETHASONE DIPROPIONATE 0.05 %
OINTMENT (GRAM) TOPICAL ONCE
OUTPATIENT
Start: 2022-12-08 | End: 2022-12-08

## 2022-12-08 RX ORDER — CLOBETASOL PROPIONATE 0.5 MG/G
OINTMENT TOPICAL ONCE
OUTPATIENT
Start: 2022-12-08 | End: 2022-12-08

## 2022-12-08 RX ORDER — ATROPINE SULFATE 10 MG/ML
SOLUTION/ DROPS OPHTHALMIC
COMMUNITY
Start: 2022-11-15

## 2022-12-08 RX ORDER — AMOXICILLIN 250 MG
1 CAPSULE ORAL NIGHTLY
COMMUNITY
Start: 2022-08-25

## 2022-12-08 NOTE — WOUND CARE
PuraPly AMTreatment Note    NAME:  Marcus Spaulding  YOB: 1945  MEDICAL RECORD NUMBER:  [id number]  DATE:  12/8/2022    Goal:  Patient will receive safe and proper application of skin substitute. Patient will comply with caring for dressing, and reporting complications. Expiration date checked immediately prior to use. Package intact prior to use and no damage noted. Transport temperature controlled and acceptable. PuraPly AM was removed from protective sterile packaging by provider and applied to prepared ulcer bed. PuraPly AM was hydrated with sterile normal saline per provider. PuraPly AM was applied to left lateral heel wound and affixed with steri-strips by the provider. PuraPly AM was covered with non-adherent ulcer dressing. Applied ABD over non-adherent. Applied dry gauze and/or roll gauze. Patient/caregiver was instructed not to remove dressing and to keep it clean and dry. Pt/family/caregiver was instructed on signs and symptoms of complications to report such as draining through dressing, dressing falling down/slipping, getting wet, or severe pain or tingling. PuraPly AM may be applied a total of 10 times per wound over a 12 week period. Date of first application of PuraPly for this current wound is December 8, 2022.    Guidelines followed    Electronically signed by Suraj Whitney PT, [address] [place] [address],[address] [place] [address] on 12/8/2022 at 3:02 PM

## 2022-12-08 NOTE — WOUND CARE
Discharge Instructions for  Windy Fairchild  [address] [place] [address]  [address], [address]  Phone [phone]   Fax [phone]      NAME:  Jose Appiah OF BIRTH:  1945  MEDICAL RECORD NUMBER:  [id number]  DATE:  12/8/2022    Return Appointment:   1 week with Memo Singh MD      Instructions: Left Lateral Heel:  Cleanse with Normal Saline  Puraply applied to wound base. Lot #: [id number]. 1.1J; Exp date: 09/20/2024. Secure with Mepilex transfer and ABD. Wrap with kerlix and Coban to level of the ankle lightly to secure graft only. Dressing change in 1 week at wound center. Plan to apply Puraply (1.6 cm disc) at next visit     Float Heel When Patient in Recliner  When in bed, with wear Rooke Boot or float heel on pillow  Hold home health for 1 week due to placement of Puraply. Should you experience increased redness, swelling, pain, foul odor, size of wound(s), or have a temperature over 101 degrees please contact the [address] [place] [address] Road at [phone] or if after hours contact your primary care physician or go to the hospital emergency department. PLEASE NOTE: IF YOU ARE UNABLE TO OBTAIN WOUND SUPPLIES, CONTINUE TO USE THE SUPPLIES YOU HAVE AVAILABLE UNTIL YOU ARE ABLE TO REACH US. IT IS MOST IMPORTANT TO KEEP THE WOUND COVERED AT ALL TIMES.     Electronically signed Lev Boogie, PT, [address] [facility] on 12/8/2022 at 2:54 PM

## 2022-12-08 NOTE — FLOWSHEET NOTE
12/08/22 1337   Wound 09/09/22 Heel Left;Lateral   Date First Assessed/Time First Assessed: 09/09/22 1535   Present on Hospital Admission: Yes  Primary Wound Type: Diabetic Ulcer  Location: Heel  Wound Location Orientation: Left;Lateral   Wound Image    Wound Etiology Diabetic Martinez 2   Dressing Status Old drainage noted   Wound Cleansed Cleansed with saline   Dressing/Treatment Collagen with Ag   Offloading for Diabetic Foot Ulcers Other (comment)  (Wheelchair)   Wound Length (cm) 1 cm   Wound Width (cm) 1.2 cm   Wound Depth (cm) 0.1 cm   Wound Surface Area (cm^2) 1.2 cm^2   Change in Wound Size % (l*w) 80   Wound Volume (cm^3) 0.12 cm^3   Wound Healing % 95   Wound Assessment Hilliard/red;Slough   Drainage Amount Small   Drainage Description Serosanguinous   Odor None   Joanna-wound Assessment Hyperkeratosis (callous)   Wound Thickness Description not for Pressure Injury Full thickness   Pain Assessment   Pain Assessment None - Denies Pain   Patient is currently taking Eliquis and Plavix

## 2022-12-08 NOTE — PROGRESS NOTES
[address]  Consult Note/H&P/Progress Note      [address] RECORD NUMBER:  [id number]  AGE: 65 y.o. RACE White (non-)  GENDER: male  : 1945  EPISODE DATE:  2022       Subjective:      CC (Chief Complaint) and HISTORY of PRESENT ILLNESS (HPI):    Angella Hills is a 65 y.o. male who presents today for wound/ulcer evaluation. He presents on 10/24/2022 with an ulcer wound on the left heel that has been present for several months. He was in his usual state of poor health when in July he had back surgery which led to a complicated abscess, prolonged hospital stay, and rehabilitation stay which ended just a few weeks ago. He is currently at home. He has significant debility. He is diabetic and has significant peripheral vascular disease with history of bilateral femoral endarterectomies. These were performed several years ago by Dr. Chalo Be. ABIs were performed in July just prior to his surgery with the JOVANA on the left of 0.6 with plan for follow-up this coming January to monitor. He has been lying in bed without use of Rooke boots and has developed an ulcer on the left heel. He has a history of MRSA infection in his back and was placed on a course of doxycycline. There is no evidence of active cellulitis today. He has had no imaging. He has not had repeat vascular evaluation. There is some description of pain in his leg at rest.  He is not active enough to discern any possible claudication. He is anticoagulated on both Eliquis and Plavix. There is no current dressing care plan. He returns on 10/31/2022. We are very fortunate that he was evaluated by vascular surgery who performed an arteriogram showing significant arterial disease. Unfortunately, his disease is not amenable to percutaneous intervention and will require a bypass in the coming weeks.   Debridement was not aggressively performed today but some loose tissue was selectively debrided and well-tolerated. He has been participating with home therapy including ambulation with a walker. Current dressing is Betadine. He returns on 11/10/2022. He is doing better with no evidence of cellulitis. He has completed antibiotics. The ulcer looks improved with current dressing of Betadine. He sees vascular on Monday to schedule bypass. He returns on 12/1/2022. He is actually improving with the heel ulcer showing improvement with just Betadine dressings. I believe he is offloading better than previously. I am not sure that the wife grasps the offloading and heel offloading concept. Vascular surgery has him set up for Femoral to distal bypass in January, but they feel he may heal without it. He was seen by ID who did not recommend further antibiotics for his foot or for his discitis. They recommend against the bypass if he continues to heal as well. He also developed issues with his eye with an acute glaucoma attack. He is being treated with nonsurgical methods at present but may need cataract surgery. He returns on 12/8/2022. There is some wound improvement. We performed skin substitute grafting with PuraPly AM #1 today which was well-tolerated. Offloading and protecting from pressure was again stressed.     This information was obtained from the patient  The following HPI elements were documented for the patient's wound:  Location: Left heel  Duration: August/September 2022  Severity: Fat layer exposed  Context: Limited activity, much lying in bed, PVD, DM, no offloading  Modifying Factors:  Nothing makes better or worse  Quality: Full-thickness, painful  Timing: Constant  Associated Signs and Symptoms: Tissue loss and possible bout of cellulitis      Ulcer Identification:  Ulcer Type: Diabetic foot ulcer left heel with fat layer exposed  Contributing Factors: Inadequate offloading, PVD, anticoagulated    PuraPlyAM: #1 (12/8/2022)        PAST MEDICAL HISTORY  Past Medical History:   Diagnosis Date    Acute respiratory failure with hypoxia ([address] 10/23/2014    MINI (acute kidney injury) ([address] 09/15/2014    MINI (acute kidney injury) ([address]     MINI (acute kidney injury) ([address]     Allergic rhinitis 11/10/2015    Asthma 11/10/2015    Benign essential hypertension 11/10/2015    Benign neoplasm of colon 11/10/2015    CAD (coronary artery disease) 11/10/2015    CAD (coronary artery disease) 1998, 1999    mix2, 3 stents [id number]    CAP (community acquired pneumonia) 12/31/2020    Cardiomyopathy ([address] 96/77/7997    Complicated wound infection 11/10/2015    COPD (chronic obstructive pulmonary disease) with emphysema ([address] 11/10/2015    COPD exacerbation ([address] 10/12/2011    COVID-19 12/31/2020    Diabetes mellitus type 2, controlled ([address] 11/10/2015    doesn/t check daily oral meds, A1c 6.0 (8/10/22)    Diabetic neuropathy ([address] 11/10/2015    Disruption of wound, unspecified 10/09/2014    Encounter for long-term (current) use of other high-risk medications 11/10/2015    Extremity atherosclerosis with intermittent claudication ([address] 11/10/2015    H/O endarterectomy 11/10/2015    Hiatal hernia 11/10/2015    History of 2019 novel coronavirus disease (COVID-19)     12/31/2020- hospitalized    Hyperglycemia due to type 1 diabetes mellitus ([address] 11/10/2015    Hyperlipidemia 11/10/2015    ICD (implantable cardioverter-defibrillator) in place 06/16/2022    Monomorphic ventricular tachycardia 12/31/2020    Myocardial infarction ([address] 1999    Myocardial infarction ([address] 11/10/2015    Obesity 11/10/2015    Orthostatic hypotension 11/10/2015    Osteoarthritis 11/10/2015    Peripheral vascular disease ([address] 11/10/2015    PNA (pneumonia) 02/08/2019    PVC (premature ventricular contraction)     Rash 05/44/6879    Seroma complicating a procedure 10/02/2014    Sleep apnea     cpap    Toe laceration     Type 2 diabetes with nephropathy ([facility]     Uncontrolled type 2 diabetes mellitus 11/10/2015    Vertiginous syndromes and other disorders of vestibular system 11/10/2015        PAST SURGICAL HISTORY  Past Surgical History:   Procedure Laterality Date    CARDIAC CATHETERIZATION  2018    LV EF=40%. LM:nl. LAD:30-40% mid stenosis. LCX OM1:95% stenosis. RCA:100% occlusion at RV branch. CATARACT REMOVAL Right 2022    CORONARY ANGIOPLASTY WITH STENT PLACEMENT  2018    LCX OM1:2.5 x 12 [place] RAKESH    CORONARY ANGIOPLASTY WITH STENT PLACEMENT      UT ABDOMEN SURGERY PROC UNLISTED      abdominal cyst removed    UT CARDIAC SURG PROCEDURE UNLIST      stents x3    SPINE SURGERY N/A 2022    LUMBAR 2, LUMBAR 4 KYPHOPLASTY AND ANY OTHER INDICATED LEVELS performed by Gunnar Rader III, MD at [facility] MAIN OR    VASCULAR SURGERY  14    Repair of right common femoral artery     VASCULAR SURGERY  14    tiffanie femoral endarterectomy    VASCULAR SURGERY Left 10/28/2022    LEFT LOWER EXTREMITY ARTERIOGRAM / ULTRASOUND GUIDED ACCESS   performed by Avel Alvarez MD at [facility] MAIN OR       FAMILY HISTORY  Family History   Problem Relation Age of Onset    Breast Cancer Mother     Hypertension Mother     Other Father         DIVERTICULITIS    Crohn's Disease Sister     Lung Disease Brother         mesothioloma    Diabetes Sister     Heart Attack Father     Heart Disease Father     Stroke Mother        SOCIAL HISTORY  Social History     Tobacco Use    Smoking status: Former     Packs/day: 1.00     Years: 50.00     Pack years: 50.00     Types: Cigarettes     Quit date: 10/2/2011     Years since quittin.1    Smokeless tobacco: Current     Types: Chew    Tobacco comments:     Chews tobacco daily   Vaping Use    Vaping Use: Never used   Substance Use Topics    Alcohol use:  Yes     Alcohol/week: 0.0 standard drinks    Drug use: No       ALLERGIES  Allergies   Allergen Reactions    Acetaminophen Hives     Per spouse has small amount of hives, takes 1/2 and tolerates Azithromycin Hives    Morphine Hallucinations     Muscle twitching. jerking    Oxaprozin Other (See Comments)     ELEVATED BLOOD PRESSURE    Oxycodone Anxiety       MEDICATIONS  Current Outpatient Medications on File Prior to Encounter   Medication Sig Dispense Refill    atropine 1 % ophthalmic solution       senna-docusate (PERICOLACE) 8.6-50 MG per tablet Take 1 tablet by mouth nightly      valsartan (DIOVAN) 80 MG tablet Take 1 tablet by mouth daily 30 tablet 5    CPAP Machine MISC by Does not apply route      albuterol sulfate HFA (PROVENTIL;VENTOLIN;PROAIR) 108 (90 Base) MCG/ACT inhaler USE 2 PUFFS BY INHALATION 4 TIMES DAILY AS NEEDED FOR WHEEZING OR SHORTNESS OF BREATH. Patient prefers ventolin. 18 g 11    GUAIFENESIN 1200 PO Take 1 tablet by mouth every 12 hours as needed      tamsulosin (FLOMAX) 0.4 MG capsule Take 0.4 mg by mouth daily      traMADol (ULTRAM) 50 MG tablet Take 50 mg by mouth every 6 hours as needed for Pain. Taking 1/2 every morning      acetaminophen (TYLENOL) 500 MG tablet Take 500 mg by mouth every 6 hours as needed for Pain Taking 1/2 two times daily      glipiZIDE (GLUCOTROL XL) 10 MG extended release tablet Take 1 tablet by mouth daily 30 tablet 0    furosemide (LASIX) 20 MG tablet Take 1 tablet by mouth in the morning. (Patient taking differently: Take 20 mg by mouth daily 1/2 tablet) 60 tablet 3    [DISCONTINUED] amLODIPine (NORVASC) 5 MG tablet Take 1 tablet by mouth in the morning. 30 tablet 3    [DISCONTINUED] QUEtiapine (SEROQUEL) 25 MG tablet Take 1 tablet by mouth in the morning.  60 tablet 3    rosuvastatin (CRESTOR) 10 MG tablet TAKE 1 TABLET BY MOUTH EVERY DAY (Patient taking differently: at bedtime) 90 tablet 0    fluticasone-salmeterol (ADVAIR) 250-50 MCG/ACT AEPB diskus inhaler Inhale 1 puff into the lungs in the morning and at bedtime      amiodarone (CORDARONE) 200 MG tablet Take 200 mg by mouth daily      apixaban (ELIQUIS) 5 MG TABS tablet Take 5 mg by mouth every 12 hours      ascorbic acid (VITAMIN C) 500 MG tablet Take 500 mg by mouth      carvedilol (COREG) 25 MG tablet Take 25 mg by mouth 2 times daily (with meals)      Cholecalciferol 50 MCG (2000 UT) TABS Take 2,000 Units by mouth      clopidogrel (PLAVIX) 75 MG tablet Take 75 mg by mouth daily      fluticasone (FLONASE) 50 MCG/ACT nasal spray USE 1 OR 2 SPRAYS IN EACH NOSTRIL EVERY DAY. latanoprost (XALATAN) 0.005 % ophthalmic solution Apply 1 drop to eye nightly      magnesium oxide (MAG-OX) 400 (240 Mg) MG tablet TAKE 1 TABLET BY MOUTH EVERY DAY      montelukast (SINGULAIR) 10 MG tablet TAKE 1 TABLET BY MOUTH EVERY DAY AT BEDTIME      nitroGLYCERIN (NITROSTAT) 0.4 MG SL tablet Place 0.4 mg under the tongue      polyethylene glycol (GLYCOLAX) 17 GM/SCOOP powder Take 17 g by mouth daily      [DISCONTINUED] metFORMIN (GLUCOPHAGE) 500 MG tablet TAKE TWO TABLETS BY MOUTH 2 TIMES A DAY       No current facility-administered medications on file prior to encounter. REVIEW OF SYSTEMS  The patient has no difficulty with chest pain or shortness of breath. No fever or chills. Comprehensive review of systems was otherwise unremarkable except as noted above. Objective:   Physical Exam:   Recent vitals (if inpt):  Patient Vitals for the past 24 hrs:   BP Temp Temp src Pulse Resp SpO2 Height Weight   12/08/22 1337 135/70 98.3 °F (36.8 °C) Oral 60 18 97 % 6' (1.829 m) 224 lb (101.6 kg)       /70   Pulse 60   Temp 98.3 °F (36.8 °C) (Oral)   Resp 18   Ht 6' (1.829 m)   Wt 224 lb (101.6 kg)   SpO2 97%   BMI 30.38 kg/m²   Wt Readings from Last 3 Encounters:   12/08/22 224 lb (101.6 kg)   12/05/22 224 lb (101.6 kg)   12/01/22 224 lb (101.6 kg)     Constitutional: Alert, oriented, cooperative patient in no acute distress; appears stated age;  General appearance is within normal limits for wound care patient population. See the today's recorded vitals signs and constitutional data.   Eyes: Pupils equal; Sclera are clear. EOMs intact; The eyes appear to track and move normally. The sclera are not injected. The conjunctive are clear. The eyelids are normal. There is no scleral icterus. ENMT: There are no obvious external ear, nose, lip or mouth lesions. Nares normal; Neck: Overall contour of the neck is normal with no obvious neck masses. Gross hearing is within normal limits. No obvious neck masses  Resp:  Breathing is non-labored; normal rate and effort; no audible wheezing. CV: RRR;  no JVD; No evidence of cyanosis of the upper extremities. The extremities are perfused without embolic sign, splinter hemorrhages, or petechia. GI: soft and non-distended without acute abnormality noted. Musculoskeletal: unremarkable with normal function. No embolic signs or cyanosis. Neuro:  Oriented; moves all 4; no focal deficits  Psychiatric: Judgement and insight are within normal limits for the wound care population of patients. Patient is oriented to person, place, and time. Recent and remote memory are within normal limits. Mood and affect are within normal limits. Integumentary (Skin/Wounds)  See inspection of wound(s) below. There are no other skin areas of palpable concern. See wound center documentation including photos in the [address] Wound Template made part of this record by reference.          Wound Care Documentation:    Wound 09/09/22 Heel Left;Lateral (Active)   Wound Image   12/08/22 1337   Wound Etiology Diabetic Martinez 2 12/08/22 1337   Dressing Status Old drainage noted 12/08/22 1337   Wound Cleansed Cleansed with saline 12/08/22 1337   Dressing/Treatment Collagen with Ag 12/08/22 1337   Offloading for Diabetic Foot Ulcers Other (comment) 12/08/22 1337   Wound Length (cm) 1 cm 12/08/22 1337   Wound Width (cm) 1.2 cm 12/08/22 1337   Wound Depth (cm) 0.1 cm 12/08/22 1337   Wound Surface Area (cm^2) 1.2 cm^2 12/08/22 1337   Change in Wound Size % (l*w) 80 12/08/22 1337   Wound Volume (cm^3) 0.12 cm^3 12/08/22 1337   Wound Healing % 95 12/08/22 1337   Wound Assessment Pink/red;Slough 12/08/22 1337   Drainage Amount Small 12/08/22 1337   Drainage Description Serosanguinous 12/08/22 1337   Odor None 12/08/22 1337   Joanna-wound Assessment Hyperkeratosis (callous) 12/08/22 1337   Margins Attached edges 10/31/22 1115   Wound Thickness Description not for Pressure Injury Full thickness 12/08/22 1337   Number of days: 90       Wound 09/13/22 Buttocks moisture skin damage (Active)   Number of days: 86        Initial WC visit 10/24/2022      F/U on 12/1/2022    Amount and/or Complexity of Data Reviewed and Analyzed:  I reviewed and analyzed all of the unique labs and radiologic studies that are shown below as well as any that are in the HPI, and any that are in the expanded problem list below  *Each unique test, order, or document contributes to the combination of 2 or combination of 3 in Category 1 below. I also independently reviewed radiology images for studies I described above in the HPI or studies I have ordered. For this visit I also reviewed old records and prior notes. No results for input(s): WBC, HGB, PLT, NA, K, CL, CO2, BUN, CREA, GLU, INR, APTT, ALT, AML, AML, LCAD, PCO2, PO2, HCO3 in the last 72 hours. Invalid input(s): PTP, TBIL, TBILI, CBIL, SGOT, GPT, AP, LPSE, NH4, TROPT, TROIQ,  PH  No results for input(s): INR, APTT, ALT, AML in the last 72 hours.     Invalid input(s): PTP, TBIL, TBILI, CBIL, SGOT, GPT, AP, LPSE    Most recent blood counts (CBC) review  Lab Results   Component Value Date    WBC 10.3 10/19/2022    HGB 9.3 (L) 10/28/2022    HCT 32.5 (L) 10/19/2022    MCV 99.7 10/19/2022     10/19/2022     Most recent chemistry (BMP) test review   Lab Results   Component Value Date/Time     10/19/2022 02:22 PM    K 4.0 10/19/2022 02:22 PM     10/19/2022 02:22 PM    CO2 29 10/19/2022 02:22 PM    BUN 15 10/19/2022 02:22 PM    CREATININE 0.80 10/28/2022 11:15 AM    CREATININE 1.20 10/19/2022 02:22 PM    GLUCOSE 156 10/19/2022 02:22 PM    CALCIUM 9.2 10/19/2022 02:22 PM      Most recent Liver enzyme test review  Lab Results   Component Value Date    ALT 31 12/05/2022    AST 28 10/19/2022    ALKPHOS 116 10/19/2022    BILITOT 0.5 10/19/2022     Most recent coagulation (coags) review  @lastinr@  Lab Results   Component Value Date/Time    INR 1.3 07/30/2022 09:53 AM    APTT 63.6 08/02/2022 05:41 AM    APTT 24.3 03/04/2022 03:57 PM       Diabetic assessment  Hemoglobin A1C   Date Value Ref Range Status   08/10/2022 6.0 (H) 4.8 - 5.6 % Final       Nutritional assessment screen to assess wound healing ability:  Lab Results   Component Value Date    LABALBU 3.2 10/19/2022     No results found for: TP, ALBR, ALB    Xray Result (most recent):  XR PELVIS (1-2 VIEWS) 10/18/2022    Narrative  Pelvis single view 10/18/2022    CLINICAL HISTORY: Fractured lower back. FINDINGS:  2 similar frontal views of the pelvis are submitted for evaluation. No abnormal  widening is seen of the sacroiliac joints or pubic symphysis. No acute displaced  fracture is seen of the pelvic ring. Assessment of the lower lumbar spine and  proximal femurs is limited by the lack of lateral views. Kyphoplasty repair is  seen at the L4 vertebral body level with some vertebral body height loss  adjustment at this level. No clear acute changes of the bilateral hips are seen. Relatively advanced atherosclerotic changes are seen of regional arterial  structures. Impression  1. Kyphoplasty repair at L4. No acute displaced fracture is seen of the bony  pelvis. CT Result (most recent):  CT FNA WITH IMAGING GUIDED 09/14/2022    Narrative  PROCEDURE: CT-guided Large Bore Needle Aspiration and Core Biopsy. Procedural Personnel  Attending physician(s): Patricia Keating M.D. Pre-procedure diagnosis: 41-year-old man with history of vertebral compression  fracture status post kyphoplasty 7/19/2022. Postoperative complications include  probable osteomyelitis and perivertebral abscess/psoas muscle abscess. CT-guided perivertebral fluid aspiration 8/1/2022 returned 2-3 cc of purulent  fluid that demonstrated high white blood cell count but no bacterial growth in  culture. Patient has completed 6 weeks of antibiotic therapy yet continues to  complain of low back pain and hip pain. Patient is currently in an antibiotic  holiday. Repeat MRI 9/6/2022 demonstrates possible small fluid collection  versus phlegmon adjacent to the L4 vertebral body or within the adjacent psoas  muscle. There is multilevel lumbar degenerative disc disease. Eliquis and  Plavix have been held in anticipation of fluid aspiration today. Today, the  patient complains only of hip pain. Post-procedure diagnosis: Same  Indication: Obtain specimen for microbiology evaluation  Previous biopsy of same target (QCDR): Yes    Complications: No immediate complications. Impression  CT-guided Needle aspiration and core biopsy of of the phlegmon  adjacent to the left side of L3-L4 disc and L4 vertebral body. The 1st needle  placement adjacent to the L4 vertebral body returned no fluid, therefore a core  biopsy was obtained. The 2nd needle placement adjacent to the L3-L4 vertebral  body initially returned no fluid, and therefore a few cc of inoculum saline was  administered and then aspirated. Plan:  Specimen(s) sent to the Microbiology department for evaluation. One hour  bedrest.  Resume Plavix tomorrow. Resume Eliquis in 48 hours. _______________________________________________________________  Technique: All CT scans at this facility are performed using dose  reduction/dose modulation techniques, as appropriate to the performed exam,  including the following:  Automated Exposure Control;  Adjustment of the MA  and/or kF according to patient size (this includes techniques or standardized  protocols for targeted exams where dose is matched to indication/reason for  exam); and Use of Iterative Reconstruction Technique. PROCEDURE SUMMARY:  - Percutaneous CT-guided Coaxial Core Needle Biopsy and Fluid Aspiration    PROCEDURE DETAILS:    Pre-procedure  Reference imaging for biopsy target: MRI 9/6/2022, 7/29/2022, 6/2/2022 as well  as CT 7/27/2022    Consent:  Informed written and oral consent for the procedure was obtained after  explanation of risks (including, but not limitted to:  hemorrhage, infection,  visceral injury, nondiagnostic biopsy) benefits and alternatives. The patient's  questions were answered to their satisfaction. They stated understanding and  requested that we proceed. Final verification:  A time-out identifying the patient and planned procedure  was performed prior to this procedure. Preparation: (MIPS) Maximal sterile barrier technique (including:  cap, mask,  sterile gown, sterile gloves, sterile sheet, hand hygiene, and cutaneous  antisepsis) was used. Anesthesia/sedation  Level of anesthesia/sedation: Moderate sedation (conscious sedation)  Anesthesia/sedation administered by: Independent trained observer under  attending supervision with continuous monitoring of the patient?s level of  consciousness and physiologic status  Total intra-service sedation time (minutes): 27    Imaging prior to biopsy  The patient was positioned prone. Initial imaging was performed using  noncontrast CT. Biopsy target:  - Maximal diameter (cm): 0.7 -  Location: soft tissue density to the left of the L4 vertebral body. Other findings: Less distinct soft tissue density to the left of the L3-L4 disc  space. Biopsy  Local anesthesia was administered. Under CT guidance, the coaxial biopsy system  was advanced to the target and aspiration was attempted but no fluid was  returned. Therefore, through the coaxial needle, an 18-gauge core biopsy was  obtained and then placed into a sterile container.   Coaxial needle: 17 gauge  Core needle biopsy device: [employer]  Core needle size: 18 gauge  Number of core specimens: 1    Attention was then turned to the subtle soft tissue density adjacent to the  L3-L4 disc. The coaxial needle was reassembled and then redirected using  intermittent CT scan imaging to this 2nd soft tissue density. The 17-gauge  coaxial needle was aspirated, returning no fluid. Therefore, 3 cc of inoculum  saline was administered and then aspirated as specimen. Large bore needle aspiration device: [employer]  Fine needle size: 17-gauge  Number of FNA specimens: 1    Needle removal  The biopsy needle was removed and a sterile dressing was applied. Tract embolization: None    Imaging following biopsy  Immediate post-biopsy imaging was not performed. Imaging modality: Not applicable. Post-biopsy imaging findings: Not applicable    Contrast  Contrast agent: None  Contrast volume (mL): 0    Radiation Dose  CT dose length product (mGy-cm):  957.09    Additional Details  Additional description of procedure: None  Equipment details: None  Specimens removed: Microbiology  Estimated blood loss (mL): Less than 10  Standardized report: [email or web address]    Attestation  Signer name: Skip Jane M.D. I attest that I personally performed the entire procedure. I reviewed the stored  images and agree with the report as written. 07/06/22    VAS DUP LOWER EXT ARTERIES BILATERAL W JOVANA 07/07/2022  7:57 AM (Final)    Interpretation Summary    Right lower extremity: The JOVANA (ankle-brachial index) is 0.78 and is abnormal. The lower extremity arterial duplex reveals an occlusion of the proximal superficial femoral artery with reconstitution at the distal proximal superficial femoral artery. There is monophasic flow in the GILBERTO, PTA and peroneal arteries at the ankle. The great toe pressure is 64mmHg.     Left lower extremity: Right lower extremity: The JOVANA (ankle-brachial index) is 0.60 and is abnormal. The lower extremity arterial duplex reveals an occlusion of the proximal superficial femoral artery with reconstitution at the below knee popliteal artery. There is monophasic flow in the GILBERTO, PTA and peroneal arteries at the ankle. The great toe pressure is 63mmHg. The abdominal aorta was evaluated and measured 2.8cm x 2.9cm at its maximum diameter, no aneurysm visualized on limited evaluation of abdominal aorta. Signed by: Dayron Fuentes MD on 7/7/2022  7:57 AM        Admission date (for inpatients): 12/8/2022   Day of Surgery  * No procedures listed *    Procedure:  Skin Substitute Application    Performed by: Radha Spears MD  Ulcer Type: arterial, non-healing/non-surgical, and neuropathic  Consent obtained: Yes  Time out taken: Yes    Product Utilized:  PuraPly AM 2 sq/cm  Fenestrated at the time of procedure: No  Instrument(s) utilized during the procedure: #15 surgical blade    Skin Substitute was Applied to Ulcer Number(s):    Ulcer #: 1  Total Surface Area of Ulcer(s) Covered 1.2 sq/cm  Was the Product Layered  No   Amount of Product Applied 1.6 sq/cm   Amount of Product Wasted 0 sq/cm   Reason for Waste: N/A. Skin Substitute was surgically fixated and secured with Other: silicone dressing . Procedural Pain: 0/10   Post Procedural Pain: 0 / 10  Response to Treatment: Patient tolerated procedure well with no complaints of pain.       Assessment:      Problem List Items Addressed This Visit          Circulatory    Arterial degeneration    Relevant Orders    Initiate Outpatient Wound Care Protocol    PAD (peripheral artery disease) ([facility]    Relevant Orders    Initiate Outpatient Wound Care Protocol    Ambulatory Referral to [facility] [place]    DM (diabetes mellitus) type II, controlled, with peripheral vascular disorder [facility])    Relevant Orders    Initiate Outpatient Wound Care Protocol    Ambulatory Referral to [facility] [place]       Endocrine    Diabetic neuropathy 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 - Primary    Relevant Orders    Initiate Outpatient Wound Care Protocol    Ambulatory Referral to DME    Antiplatelet or antithrombotic long-term use    Relevant Orders    Initiate Outpatient Wound Care Protocol    Ambulatory Referral to DME    Physical debility    Relevant Orders    Initiate Outpatient Wound Care Protocol    Ambulatory Referral to DME    H/O endarterectomy    Relevant Orders    Initiate Outpatient Wound Care Protocol       [unfilled]    Active Problems:    * No active hospital problems. *  Resolved Problems:    * No resolved hospital problems.  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 10/24/2022     Priority: High    Antiplatelet or antithrombotic long-term use 10/24/2022     Priority: High     Eliquis and Plavix      Chest pain 12/05/2018     Priority: High    Lumbar discitis 09/08/2022     Priority: Medium    Psoas muscle abscess ([facility] 09/08/2022     Priority: Medium    Physical debility 08/26/2022     Priority: Medium    General weakness 07/27/2022     Priority: Medium    Acute cystitis without hematuria 07/27/2022     Priority: Medium    Low back pain without sciatica 07/27/2022     Priority: Medium    Back pain 07/27/2022     Priority: Medium    DNI (do not intubate) 07/27/2022     Priority: Medium    Elevated liver enzymes 07/27/2022     Priority: Medium    Anemia 07/27/2022     Priority: Medium    Hyponatremia 07/27/2022     Priority: Medium    ICD (implantable cardioverter-defibrillator) in place 06/16/2022     Priority: Medium    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, vertebra(e), init ([facility] 07/07/2022     Priority: Low     Added automatically from request for surgery [id number]      Atrial fibrillation ([facility] 03/04/2022     Priority: Low    Ventricular tachycardia 03/04/2022     Priority: Low    VT (ventricular tachycardia) 03/04/2022     Priority: Low    Acute metabolic encephalopathy 58/87/4150     Priority: Low    Irregular heart beat 03/02/2021     Priority: Low    PVC's (premature ventricular contractions) 03/02/2021     Priority: Low    Elevated troponin 12/31/2020     Priority: Low    Toe laceration 12/09/2019     Priority: Low    Type 2 diabetes with nephropathy ([facility] 06/17/2019     Priority: Low    Hypoxia 02/08/2019     Priority: Low    Sepsis ([address] 02/08/2019     Priority: Low    Abnormal nuclear cardiac imaging test 11/27/2018     Priority: Low    Cigarette nicotine dependence in remission 08/20/2018     Priority: Low    Chronic pain of right knee 12/14/2017     Priority: Low    Obstructive sleep apnea 08/11/2017     Priority: Low    Intermittent spinal claudication ([facility] [facility] 06/13/2017     Priority: Low    Pulmonary emphysema ([facility] [facility] 11/17/2016     Priority: Low    H/O endarterectomy 11/10/2015     Priority: Low    Complicated wound infection 11/10/2015     Priority: Low    Allergic rhinitis 11/10/2015     Priority: Low    Hiatal hernia 11/10/2015     Priority: Low    Diabetic neuropathy ([facility] 11/10/2015     Priority: Low    Osteoarthritis 11/10/2015     Priority: Low    Encounter for long-term (current) drug use 11/10/2015     Priority: Low    Benign neoplasm of colon 11/10/2015     Priority: Low    PAD (peripheral artery disease) ([facility] 11/10/2015     Priority: Low    Asthma 11/10/2015     Priority: Low    Orthostatic hypotension 11/10/2015     Priority: Low    Hyperlipidemia 11/10/2015     Priority: Low    S/P angioplasty with stent 11/10/2015     Priority: Low    COPD (chronic obstructive pulmonary disease) ([facility] 04/10/2015     Priority: Low    Arterial degeneration 11/05/2014     Priority: Low     11/6/14 (Dr [facility]) 1. Bleeding from right groin wound. 2. Disruption of right common femoral artery patch anastomosis and   possible arterial infection. diabetes. He is anticoagulated on Eliquis and Plavix. He has had femoral endarterectomies in the past and is now followed by Dr. Glenn Ng. JOVANA in July was abnormal and has not been reevaluated since deterioration of his tissue. He is scheduled for repeat JOVANA in January and we will consult vascular surgery for earlier evaluation. Vascular consultation to follow as needed. He has not been offloading despite having work boots. There is clearly a pressure component. There is no mirror lesion on the right heel and may reflect his discrepancy in blood supply. He does describe some episodes that could be rest pain though it does not drop his legs for improvement. This may also be limited by his debility. We will not do debridement today. We will dress with Betadine and an absorbent pad and follow-up in 1 week. Offloading was emphasized. He returns on 10/31/2022. He underwent arteriography but is not a candidate for percutaneous intervention. He is being set up for formal bypass in November. Therapy is having him walk on his foot and offloading was again stressed. Current dressing remains Betadine. I will see him back in 1.5 weeks which will be before his bypass surgery. He returns on 11/10/2022. He is improved and has completed antibiotics. Current dressing is Betadine. He will see vascular surgery on Monday to schedule bypass surgery. He returns on 12/1/2022. He was seen by vascular surgery who have set him up for femoral to distal bypass in January, but feel it may not be necessary as he continues to heal.  This is probably secondary to better offloading. He was also seen by ID who also feel that the bypass should be avoided if possible since he is continuing to heal.  I agree with both specialists that he is continuing to heal and we will move forward with adjuncts to healing up. We will start with skin substitutes. I will order Jennifer WHITE for next visit.   This would be the best place to start when we cannot perform aggressive debridement due to his vascular status. We will then transition to a growth factor product. Returns on 12/8/2022. There continues to be some improvement. Still some issues with understanding offloading. PuraPly AM #1 was applied today and well-tolerated. We will order a graft for next week. I will see him back in 1 week.       Wound Care  Orders Placed This Encounter   Procedures    Initiate Outpatient Wound Care Protocol     Cleanse wound with saline    If wound contains bioburden or contamination cleanse with wound cleanser or antimicrobial solution     For normal periwound tissue without irritation nor maceration, apply topical skin protectant    For periwound tissue with irritation and/or maceration, apply zinc based product, topical steroid cream/ointment, or equivalent     For wounds with dry firm black eschar and/or without exudate, apply betadine and leave open to air      For wounds with scant/small to no exudate or drainage, apply wound gel, hydrocolloid, polymer, or equivalent and cover with secondary dressing/foam      For wounds with moderate/large exudate or drainage, apply alginate, hydrofiber, polymer, or equivalent and cover with secondary dressing/foam    For wounds with nonviable tissue requiring removal, apply chemical or mechanical debrider and cover with secondary dressing/foam    For wounds with tunneling, dead space, or cavity, fill or pack with strip/gauze/kerlex to fit and cover with secondary dressing/foam    For wounds with adequate granulation or epithelization, apply wound gel, hydrocolloid, polymer, collagen, or transparent film, and cover secondary dry dressing/foam    For wounds that need additional secondary dressing to help pad or control additional drainage/exudates, add foam, absorbent pad or hydrocolloid    For wounds with suspected or known infection, apply antimicrobial mesh and/or antimicrobial alginate/hydrofiber, or antimicrobial solution moistened gauze/kerlex, or equivalent and cover with secondary dressing/foam    Compression Management needed for edema control, apply multilayer compression or tubular garment or equivalent    Offloading Management needed for pressure relief, apply offloading shoe/boot or equivalent     Standing Status:   Standing     Number of Occurrences:   1    Ambulatory Referral to DME     Referral Priority:   Routine     Referral Reason:   Specialty Services Required     Number of Visits Requested:   1       Return Appointment:   1 week with Jenna Trevino MD        Instructions: Left Lateral Heel:  Cleanse with Normal Saline  Puraply applied to wound base. Lot #: [id number]. 1.1J; Exp date: 09/20/2024. Secure with Mepilex transfer and ABD. Wrap with kerlix and Coban to level of the ankle lightly to secure graft only. Dressing change in 1 week at wound center. Plan to apply Puraply (1.6 cm disc) at next visit     Float Heel When Patient in Recl[place]  When in bed, with wear Rooke Boot or float heel on pillow  Hold home health for 1 week due to placement of Puraply. Level of MDM (2/3 elements below)  Number and Complexity of Problems Addressed Amount and/or Complexity of Data to be Reviewed and Analyzed  *Each unique test, order, or document contributes to the combination of 2 or combination of 3 in Category 1 below. Risk of Complications and/or Morbidity or Mortality of pt Management     55588  74744 SF Minimal  ?1self-limited or minor problem Minimal or none Minimal risk of morbidity from additional diagnostic testing or Rx   67258  84202 Low Low  ? 2or more self-limited or minor problems;    or  ? 1stable chronic illness;    or  ?1acute, uncomplicated illness or injury   Limited  (Must meet the requirements of at least 1 of the 2 categories)  Category 1: Tests and documents   ? Any combination of 2 from the following:  ?Review of prior external note(s) from each unique source*;  ?review of the result(s) of each unique test*;   ?ordering of each unique test*    or   Category 2: Assessment requiring an independent historian(s)  (For the categories of independent interpretation of tests and discussion of management or test interpretation, see moderate or high) Low risk of morbidity from additional diagnostic testing or treatment     11924  82825 Mod Moderate  ? 1or more chronic illnesses with exacerbation, progression, or side effects of treatment;    or  ?2or more stable chronic illnesses;    or  ?1undiagnosed new problem with uncertain prognosis;    or  ?1acute illness with systemic symptoms;    or  ?1acute complicated injury   Moderate  (Must meet the requirements of at least 1 out of 3 categories)  Category 1: Tests, documents, or independent historian(s)  ? Any combination of 3 from the following:   ?Review of prior external note(s) from each unique source*;  ?Review of the result(s) of each unique test*;  ?Ordering of each unique test*;  ?Assessment requiring an independent historian(s)    or  Category 2: Independent interpretation of tests   ? Independent interpretation of a test performed by another physician/other qualified health care professional (not separately reported);     or  Category 3: Discussion of management or test interpretation  ? Discussion of management or test interpretation with external physician/other qualified health care professional/appropriate source (not separately reported)   Moderate risk of morbidity from additional diagnostic testing or treatment  Examples only:  ?Prescription drug management - advanced wound care prescription Rx wound care products  (eg Iodosorb, hydrofera, silvadene, collagen, puracol plus, optiloc, biologics - placenta, Theraskin, Apligraf). Note also that if home health care is involved, they will not apply topical therapies without a prescription/order from physician      ? Decision regarding minor surgery with identified patient or procedure risk factors  ? Decision regarding elective major surgery without identified patient or procedure risk factors   ? Diagnosis or treatment significantly limited by social determinants of health       [id number] High High  ? 1or more chronic illnesses with severe exacerbation, progression, or side effects of treatment;    or  ?1 acute or chronic illness or injury that poses a threat to life or bodily function   Extensive  (Must meet the requirements of at least 2 out of 3 categories)  Category 1: Tests, documents, or independent historian(s)  ? Any combination of 3 from the following:   ?Review of prior external note(s) from each unique source*;  ?Review of the result(s) of each unique test*;   ?Ordering of each unique test*;   ?Assessment requiring an independent historian(s)    or   Category 2: Independent interpretation of tests   ? Independent interpretation of a test performed by another physician/other qualified health care professional (not separately reported);     or  Category 3: Discussion of management or test interpretation  ? Discussion of management or test interpretation with external physician/other qualified health care professional/appropriate source (not separately reported)   High risk of morbidity from additional diagnostic testing or treatment  Examples only:  ?Drug therapy requiring intensive monitoring for toxicity  ? Decision regarding elective major surgery with identified patient or procedure risk factors  ? Decision regarding emergency major surgery  ? Decision regarding hospitalization  ? Decision not to resuscitate or to de-escalate care because of poor prognosis                 I have personally performed a face-to-face diagnostic evaluation and management  service on this patient. I have independently seen the patient. I have independently obtained the above history from the patient/family. I have independently examined the patient with above findings.   I have independently reviewed data/labs for this patient and developed the above plan of care (MDM).       Electronically signed by Shania Boggs MD on 12/8/2022 at 5:08 PM

## 2022-12-15 ENCOUNTER — HOSPITAL ENCOUNTER (OUTPATIENT)
Dept: WOUND CARE | Age: 77
Discharge: HOME | End: 2022-12-15
Payer: MEDICARE

## 2022-12-15 VITALS
BODY MASS INDEX: 30.34 KG/M2 | SYSTOLIC BLOOD PRESSURE: 135 MMHG | HEIGHT: 72 IN | WEIGHT: 224 LBS | HEART RATE: 60 BPM | DIASTOLIC BLOOD PRESSURE: 64 MMHG

## 2022-12-15 DIAGNOSIS — I70.213 ATHEROSCLEROSIS OF NATIVE ARTERY OF BOTH LOWER EXTREMITIES WITH INTERMITTENT CLAUDICATION (HCC): ICD-10-CM

## 2022-12-15 DIAGNOSIS — Z79.02 ANTIPLATELET OR ANTITHROMBOTIC LONG-TERM USE: ICD-10-CM

## 2022-12-15 DIAGNOSIS — E11.51 DM (DIABETES MELLITUS) TYPE II, CONTROLLED, WITH PERIPHERAL VASCULAR DISORDER (HCC): ICD-10-CM

## 2022-12-15 DIAGNOSIS — E11.42 DIABETIC POLYNEUROPATHY ASSOCIATED WITH TYPE 2 DIABETES MELLITUS (HCC): ICD-10-CM

## 2022-12-15 DIAGNOSIS — L97.422 NON-PRESSURE CHRONIC ULCER OF LEFT HEEL AND MIDFOOT WITH FAT LAYER EXPOSED (HCC): Primary | ICD-10-CM

## 2022-12-15 DIAGNOSIS — I73.9 PAD (PERIPHERAL ARTERY DISEASE) (HCC): ICD-10-CM

## 2022-12-15 DIAGNOSIS — R53.81 PHYSICAL DEBILITY: ICD-10-CM

## 2022-12-15 PROCEDURE — 15271 SKIN SUB GRAFT TRNK/ARM/LEG: CPT

## 2022-12-15 NOTE — WOUND CARE
Discharge Instructions for  Windy Fairchild  [address]  [facility], [address]  Phone [phone]   Fax [phone]      NAME:  Shelbi Robles OF BIRTH:  1945  MEDICAL RECORD NUMBER:  [id number]  DATE:  12/15/2022    Return Appointment:   2 weeks with Mayra Jenkins MD  Dressing change in 1 week      Instructions:  Left Lateral Heel:  Cleanse with Normal Saline  Puraply applied to wound base. Lot #: [id number]. 1.1J; Exp date: 11/16/2024  Secure with Mepilex transfer and ABD. Wrap with kerlix and Coban to level of the ankle lightly to secure graft only. Dressing change in 1 week at wound center. Change dressing and may apply plain collagen cover with ABD and rolled gauze until follow up with MD the following week       Plan to apply Puraply (1.6 cm disc) at next visit ( in 2 weeks)     Float Heel When Patient in [address]  When in bed, with wear Rooke Boot or float heel on pillow  Hold home health for 1 week due to placement of Puraply. Should you experience increased redness, swelling, pain, foul odor, size of wound(s), or have a temperature over 101 degrees please contact the [address] [place] [address] Road at [phone] or if after hours contact your primary care physician or go to the hospital emergency department. PLEASE NOTE: IF YOU ARE UNABLE TO OBTAIN WOUND SUPPLIES, CONTINUE TO USE THE SUPPLIES YOU HAVE AVAILABLE UNTIL YOU ARE ABLE TO REACH US. IT IS MOST IMPORTANT TO KEEP THE WOUND COVERED AT ALL TIMES.     Electronically signed Godwin Larios RN on 12/15/2022 at 3:14 PM

## 2022-12-15 NOTE — DISCHARGE INSTRUCTIONS
Discharge Instructions for  Windy Fairchild  [address] [facility] [address]  [address] [facility], [address]  Phone [phone]   Fax [phone]      NAME:  Danilo Hickey  YOB: 1945  MEDICAL RECORD NUMBER:  [id number]  DATE:  @ED@    Return Appointment:   2 weeks with Familia Donovan MD  1 week for dressing change    Instructions:  Left Lateral Heel:  Cleanse with Normal Saline  Puraply applied to wound base. Lot #: [id number]. 1.1J; Exp date: 11/16/2024  Secure with Mepilex transfer and ABD. Wrap with kerlix and Coban to level of the ankle lightly to secure graft only. Dressing change in 1 week at wound center. Change dressing and may apply plain collagen cover with ABD and rolled gauze until follow up with MD the following week        Plan to apply Puraply (1.6 cm disc) at next visit ( in 2 weeks)     Float Heel When Patient in [address]  When in bed, with wear Rooke Boot or float heel on pillow  Hold home health for 1 week due to placement of Puraply. Should you experience increased redness, swelling, pain, foul odor, size of wound(s), or have a temperature over 101 degrees please contact the [address] [place] [address] Road at [phone] or if after hours contact your primary care physician or go to the hospital emergency department. PLEASE NOTE: IF YOU ARE UNABLE TO OBTAIN WOUND SUPPLIES, CONTINUE TO USE THE SUPPLIES YOU HAVE AVAILABLE UNTIL YOU ARE ABLE TO REACH US. IT IS MOST IMPORTANT TO KEEP THE WOUND COVERED AT ALL TIMES.     Electronically signed Jennifer Parrish RN on 12/15/2022 at 3:17 PM

## 2022-12-15 NOTE — WOUND CARE
PuraPly AMTreatment Note    NAME:  Florencia Rodriguez  YOB: 1945  MEDICAL RECORD NUMBER:  [id number]  DATE:  12/15/2022    Goal:  Patient will receive safe and proper application of skin substitute. Patient will comply with caring for dressing, and reporting complications. Expiration date checked immediately prior to use. Package intact prior to use and no damage noted. Transport temperature controlled and acceptable. PuraPly AM was removed from protective sterile packaging by provider and applied to prepared ulcer bed. PuraPly AM was hydrated with sterile normal saline per provider. PuraPly AM was applied to wound and affixed with steri-strips by the provider. PuraPly AM was covered with non-adherent ulcer dressing. Applied mepilex transfer over non-adherent. Applied dry gauze and/or roll gauze. Patient/caregiver was instructed not to remove dressing and to keep it clean and dry. Pt/family/caregiver was instructed on signs and symptoms of complications to report such as draining through dressing, dressing falling down/slipping, getting wet, or severe pain or tingling. PuraPly AM may be applied a total of 10 times per wound over a 12 week period. Date of first application of PuraPly for this current wound is December 8, 2022.    Guidelines followed    Electronically signed by Donaldo Parkinson RN on 12/15/2022 at 3:18 PM

## 2022-12-15 NOTE — FLOWSHEET NOTE
12/15/22 1437   Wound 09/09/22 Heel Left;Lateral   Date First Assessed/Time First Assessed: 09/09/22 1535   Present on Hospital Admission: Yes  Primary Wound Type: Diabetic Ulcer  Location: Heel  Wound Location Orientation: Left;Lateral   Wound Image    Wound Etiology Diabetic Martinez 2   Dressing Status Old drainage noted   Wound Cleansed Cleansed with saline   Dressing/Treatment   (puraply)   Wound Length (cm) 1 cm   Wound Width (cm) 1.5 cm   Wound Depth (cm) 0.1 cm   Wound Surface Area (cm^2) 1.5 cm^2   Change in Wound Size % (l*w) 75   Wound Volume (cm^3) 0.15 cm^3   Wound Healing % 94   Wound Assessment Pink/red; Hyper granulation tissue;Slough   Drainage Amount Small   Drainage Description Serosanguinous   Odor None   Joanna-wound Assessment Hyperkeratosis (callous)   Wound Thickness Description not for Pressure Injury Full thickness   Patient is currently taking eliquis and plavix

## 2022-12-16 NOTE — PROGRESS NOTES
[address]  Consult Note/H&P/Progress Note      [address] RECORD NUMBER:  [id number]  AGE: 65 y.o. RACE White (non-)  GENDER: male  : 1945  EPISODE DATE:  12/15/2022       Subjective:      CC (Chief Complaint) and HISTORY of PRESENT ILLNESS (HPI):    Shikha Melgoza is a 65 y.o. male who presents today for wound/ulcer evaluation. He presents on 10/24/2022 with an ulcer wound on the left heel that has been present for several months. He was in his usual state of poor health when in July he had back surgery which led to a complicated abscess, prolonged hospital stay, and rehabilitation stay which ended just a few weeks ago. He is currently at home. He has significant debility. He is diabetic and has significant peripheral vascular disease with history of bilateral femoral endarterectomies. These were performed several years ago by Dr. Fina Crowe. ABIs were performed in July just prior to his surgery with the JOVANA on the left of 0.6 with plan for follow-up this coming January to monitor. He has been lying in bed without use of Rooke boots and has developed an ulcer on the left heel. He has a history of MRSA infection in his back and was placed on a course of doxycycline. There is no evidence of active cellulitis today. He has had no imaging. He has not had repeat vascular evaluation. There is some description of pain in his leg at rest.  He is not active enough to discern any possible claudication. He is anticoagulated on both Eliquis and Plavix. There is no current dressing care plan. He returns on 10/31/2022. We are very fortunate that he was evaluated by vascular surgery who performed an arteriogram showing significant arterial disease. Unfortunately, his disease is not amenable to percutaneous intervention and will require a bypass in the coming weeks.   Debridement was not aggressively performed today but some loose tissue was selectively debrided and well-tolerated. He has been participating with home therapy including ambulation with a walker. Current dressing is Betadine. He returns on 11/10/2022. He is doing better with no evidence of cellulitis. He has completed antibiotics. The ulcer looks improved with current dressing of Betadine. He sees vascular on Monday to schedule bypass. He returns on 12/1/2022. He is actually improving with the heel ulcer showing improvement with just Betadine dressings. I believe he is offloading better than previously. I am not sure that the wife grasps the offloading and heel offloading concept. Vascular surgery has him set up for Femoral to distal bypass in January, but they feel he may heal without it. He was seen by ID who did not recommend further antibiotics for his foot or for his discitis. They recommend against the bypass if he continues to heal as well. He also developed issues with his eye with an acute glaucoma attack. He is being treated with nonsurgical methods at present but may need cataract surgery. He returns on 12/8/2022. There is some wound improvement. We performed skin substitute grafting with PuraPly AM #1 today which was well-tolerated. Offloading and protecting from pressure was again stressed. He returns on 12/15/2022. There is improvement and better offloading. PuraPly AM #2 was applied today and well-tolerated.       This information was obtained from the patient  The following HPI elements were documented for the patient's wound:  Location: Left heel  Duration: August/September 2022  Severity: Fat layer exposed  Context: Limited activity, much lying in bed, PVD, DM, no offloading  Modifying Factors:  Nothing makes better or worse  Quality: Full-thickness, painful  Timing: Constant  Associated Signs and Symptoms: Tissue loss and possible bout of cellulitis      Ulcer Identification:  Ulcer Type: Diabetic foot ulcer left heel with fat layer exposed  Contributing Factors: Inadequate offloading, PVD, anticoagulated    PuraPlyAM: #1 (12/8/2022), #2 (12/15)        PAST MEDICAL HISTORY  Past Medical History:   Diagnosis Date    Acute respiratory failure with hypoxia ([address] 10/23/2014    MINI (acute kidney injury) ([address] 09/15/2014    MINI (acute kidney injury) ([address]     MINI (acute kidney injury) ([address]     Allergic rhinitis 11/10/2015    Asthma 11/10/2015    Benign essential hypertension 11/10/2015    Benign neoplasm of colon 11/10/2015    CAD (coronary artery disease) 11/10/2015    CAD (coronary artery disease) 1998, 1999    mix2, 3 stents [id number]    CAP (community acquired pneumonia) 12/31/2020    Cardiomyopathy ([address] 26/89/0131    Complicated wound infection 11/10/2015    COPD (chronic obstructive pulmonary disease) with emphysema ([address] 11/10/2015    COPD exacerbation ([address] 10/12/2011    COVID-19 12/31/2020    Diabetes mellitus type 2, controlled ([address] 11/10/2015    doesn/t check daily oral meds, A1c 6.0 (8/10/22)    Diabetic neuropathy ([address] 11/10/2015    Disruption of wound, unspecified 10/09/2014    Encounter for long-term (current) use of other high-risk medications 11/10/2015    Extremity atherosclerosis with intermittent claudication ([address] 11/10/2015    H/O endarterectomy 11/10/2015    Hiatal hernia 11/10/2015    History of 2019 novel coronavirus disease (COVID-19)     12/31/2020- hospitalized    Hyperglycemia due to type 1 diabetes mellitus ([address] 11/10/2015    Hyperlipidemia 11/10/2015    ICD (implantable cardioverter-defibrillator) in place 06/16/2022    Monomorphic ventricular tachycardia 12/31/2020    Myocardial infarction [facility] 1999    Myocardial infarction ([address] 11/10/2015    Obesity 11/10/2015    Orthostatic hypotension 11/10/2015    Osteoarthritis 11/10/2015    Peripheral vascular disease ([address] 11/10/2015    PNA (pneumonia) 02/08/2019    PVC (premature ventricular contraction)     Rash 49/11/6682    Seroma complicating a procedure 10/02/2014    Sleep apnea     cpap    Toe laceration     Type 2 diabetes with nephropathy [facility]     Uncontrolled type 2 diabetes mellitus 11/10/2015    Vertiginous syndromes and other disorders of vestibular system 11/10/2015        PAST SURGICAL HISTORY  Past Surgical History:   Procedure Laterality Date    CARDIAC CATHETERIZATION  2018    LV EF=40%. LM:nl. LAD:30-40% mid stenosis. LCX OM1:95% stenosis. RCA:100% occlusion at RV branch. CATARACT REMOVAL Right 2022    CORONARY ANGIOPLASTY WITH STENT PLACEMENT  2018    LCX OM1:2.5 x 12 [place] RAKESH    CORONARY ANGIOPLASTY WITH STENT PLACEMENT      SC ABDOMEN SURGERY PROC UNLISTED      abdominal cyst removed    SC CARDIAC SURG PROCEDURE UNLIST      stents x3    SPINE SURGERY N/A 2022    LUMBAR 2, LUMBAR 4 KYPHOPLASTY AND ANY OTHER INDICATED LEVELS performed by Akilah Hampton III, MD at [facility] MAIN OR    VASCULAR SURGERY  14    Repair of right common femoral artery     VASCULAR SURGERY  14    tiffanie femoral endarterectomy    VASCULAR SURGERY Left 10/28/2022    LEFT LOWER EXTREMITY ARTERIOGRAM / ULTRASOUND GUIDED ACCESS   performed by Edelmira Burgos MD at [facility] MAIN OR       FAMILY HISTORY  Family History   Problem Relation Age of Onset    Breast Cancer Mother     Hypertension Mother     Other Father         DIVERTICULITIS    Crohn's Disease Sister     Lung Disease Brother         mesothioloma    Diabetes Sister     Heart Attack Father     Heart Disease Father     Stroke Mother        SOCIAL HISTORY  Social History     Tobacco Use    Smoking status: Former     Packs/day: 1.00     Years: 50.00     Pack years: 50.00     Types: Cigarettes     Quit date: 10/2/2011     Years since quittin.2    Smokeless tobacco: Current     Types: Chew    Tobacco comments:     Chews tobacco daily   Vaping Use    Vaping Use: Never used   Substance Use Topics    Alcohol use: Yes     Alcohol/week: 0.0 standard drinks    Drug use:  No ALLERGIES  Allergies   Allergen Reactions    Acetaminophen Hives     Per spouse has small amount of hives, takes 1/2 and tolerates     Azithromycin Hives    Morphine Hallucinations     Muscle twitching. jerking    Oxaprozin Other (See Comments)     ELEVATED BLOOD PRESSURE    Oxycodone Anxiety       MEDICATIONS  Current Outpatient Medications on File Prior to Encounter   Medication Sig Dispense Refill    atropine 1 % ophthalmic solution       senna-docusate (PERICOLACE) 8.6-50 MG per tablet Take 1 tablet by mouth nightly      valsartan (DIOVAN) 80 MG tablet Take 1 tablet by mouth daily 30 tablet 5    CPAP Machine MISC by Does not apply route      albuterol sulfate HFA (PROVENTIL;VENTOLIN;PROAIR) 108 (90 Base) MCG/ACT inhaler USE 2 PUFFS BY INHALATION 4 TIMES DAILY AS NEEDED FOR WHEEZING OR SHORTNESS OF BREATH. Patient prefers ventolin. 18 g 11    GUAIFENESIN 1200 PO Take 1 tablet by mouth every 12 hours as needed      tamsulosin (FLOMAX) 0.4 MG capsule Take 0.4 mg by mouth daily      traMADol (ULTRAM) 50 MG tablet Take 50 mg by mouth every 6 hours as needed for Pain. Taking 1/2 every morning      acetaminophen (TYLENOL) 500 MG tablet Take 500 mg by mouth every 6 hours as needed for Pain Taking 1/2 two times daily      glipiZIDE (GLUCOTROL XL) 10 MG extended release tablet Take 1 tablet by mouth daily 30 tablet 0    furosemide (LASIX) 20 MG tablet Take 1 tablet by mouth in the morning. (Patient taking differently: Take 20 mg by mouth daily 1/2 tablet) 60 tablet 3    [DISCONTINUED] amLODIPine (NORVASC) 5 MG tablet Take 1 tablet by mouth in the morning. 30 tablet 3    [DISCONTINUED] QUEtiapine (SEROQUEL) 25 MG tablet Take 1 tablet by mouth in the morning.  60 tablet 3    rosuvastatin (CRESTOR) 10 MG tablet TAKE 1 TABLET BY MOUTH EVERY DAY (Patient taking differently: at bedtime) 90 tablet 0    fluticasone-salmeterol (ADVAIR) 250-50 MCG/ACT AEPB diskus inhaler Inhale 1 puff into the lungs in the morning and at bedtime      amiodarone (CORDARONE) 200 MG tablet Take 200 mg by mouth daily      apixaban (ELIQUIS) 5 MG TABS tablet Take 5 mg by mouth every 12 hours      ascorbic acid (VITAMIN C) 500 MG tablet Take 500 mg by mouth      carvedilol (COREG) 25 MG tablet Take 25 mg by mouth 2 times daily (with meals)      Cholecalciferol 50 MCG (2000 UT) TABS Take 2,000 Units by mouth      clopidogrel (PLAVIX) 75 MG tablet Take 75 mg by mouth daily      fluticasone (FLONASE) 50 MCG/ACT nasal spray USE 1 OR 2 SPRAYS IN EACH NOSTRIL EVERY DAY. latanoprost (XALATAN) 0.005 % ophthalmic solution Apply 1 drop to eye nightly      magnesium oxide (MAG-OX) 400 (240 Mg) MG tablet TAKE 1 TABLET BY MOUTH EVERY DAY      montelukast (SINGULAIR) 10 MG tablet TAKE 1 TABLET BY MOUTH EVERY DAY AT BEDTIME      nitroGLYCERIN (NITROSTAT) 0.4 MG SL tablet Place 0.4 mg under the tongue      polyethylene glycol (GLYCOLAX) 17 GM/SCOOP powder Take 17 g by mouth daily      [DISCONTINUED] metFORMIN (GLUCOPHAGE) 500 MG tablet TAKE TWO TABLETS BY MOUTH 2 TIMES A DAY       No current facility-administered medications on file prior to encounter. REVIEW OF SYSTEMS  The patient has no difficulty with chest pain or shortness of breath. No fever or chills. Comprehensive review of systems was otherwise unremarkable except as noted above.       Objective:   Physical Exam:   Recent vitals (if inpt):  Patient Vitals for the past 24 hrs:   BP Pulse Height Weight   12/15/22 1502 135/64 60 -- --   12/15/22 1436 135/64 60 -- --   12/15/22 1413 -- -- 6' (1.829 m) 224 lb (101.6 kg)       /64   Pulse 60   Ht 6' (1.829 m)   Wt 224 lb (101.6 kg)   BMI 30.38 kg/m²   Wt Readings from Last 3 Encounters:   12/15/22 224 lb (101.6 kg)   12/08/22 224 lb (101.6 kg)   12/05/22 224 lb (101.6 kg)     Constitutional: Alert, oriented, cooperative patient in no acute distress; appears stated age;  General appearance is within normal limits for wound care patient population. See the today's recorded vitals signs and constitutional data. Eyes: Pupils equal; Sclera are clear. EOMs intact; The eyes appear to track and move normally. The sclera are not injected. The conjunctive are clear. The eyelids are normal. There is no scleral icterus. ENMT: There are no obvious external ear, nose, lip or mouth lesions. Nares normal; Neck: Overall contour of the neck is normal with no obvious neck masses. Gross hearing is within normal limits. No obvious neck masses  Resp:  Breathing is non-labored; normal rate and effort; no audible wheezing. CV: RRR;  no JVD; No evidence of cyanosis of the upper extremities. The extremities are perfused without embolic sign, splinter hemorrhages, or petechia. GI: soft and non-distended without acute abnormality noted. Musculoskeletal: unremarkable with normal function. No embolic signs or cyanosis. Neuro:  Oriented; moves all 4; no focal deficits  Psychiatric: Judgement and insight are within normal limits for the wound care population of patients. Patient is oriented to person, place, and time. Recent and remote memory are within normal limits. Mood and affect are within normal limits. Integumentary (Skin/Wounds)  See inspection of wound(s) below. There are no other skin areas of palpable concern. See wound center documentation including photos in the [address] Wound Template made part of this record by reference.          Wound Care Documentation:    Wound 09/09/22 Heel Left;Lateral (Active)   Wound Image   12/15/22 1437   Wound Etiology Diabetic Martinez 2 12/15/22 1437   Dressing Status Old drainage noted 12/15/22 1437   Wound Cleansed Cleansed with saline 12/15/22 1437   Dressing/Treatment Collagen with Ag 12/08/22 1337   Offloading for Diabetic Foot Ulcers Other (comment) 12/08/22 1337   Wound Length (cm) 1 cm 12/15/22 1437   Wound Width (cm) 1.5 cm 12/15/22 1437   Wound Depth (cm) 0.1 cm 12/15/22 1437   Wound Surface Area (cm^2) 1.5 cm^2 12/15/22 1437   Change in Wound Size % (l*w) 75 12/15/22 1437   Wound Volume (cm^3) 0.15 cm^3 12/15/22 1437   Wound Healing % 94 12/15/22 1437   Wound Assessment Pink/red; Hyper granulation tissue;Slough 12/15/22 1437   Drainage Amount Small 12/15/22 1437   Drainage Description Serosanguinous 12/15/22 1437   Odor None 12/15/22 1437   Joanna-wound Assessment Hyperkeratosis (callous) 12/15/22 1437   Margins Attached edges 10/31/22 1115   Wound Thickness Description not for Pressure Injury Full thickness 12/15/22 1437   Number of days: 97       Wound 09/13/22 Buttocks moisture skin damage (Active)   Number of days: 93        Initial WC visit 10/24/2022      F/U on 12/1/2022    Amount and/or Complexity of Data Reviewed and Analyzed:  I reviewed and analyzed all of the unique labs and radiologic studies that are shown below as well as any that are in the HPI, and any that are in the expanded problem list below  *Each unique test, order, or document contributes to the combination of 2 or combination of 3 in Category 1 below. I also independently reviewed radiology images for studies I described above in the HPI or studies I have ordered. For this visit I also reviewed old records and prior notes. No results for input(s): WBC, HGB, PLT, NA, K, CL, CO2, BUN, CREA, GLU, INR, APTT, ALT, AML, AML, LCAD, PCO2, PO2, HCO3 in the last 72 hours. Invalid input(s): PTP, TBIL, TBILI, CBIL, SGOT, GPT, AP, LPSE, NH4, TROPT, TROIQ,  PH  No results for input(s): INR, APTT, ALT, AML in the last 72 hours.     Invalid input(s): PTP, TBIL, TBILI, CBIL, SGOT, GPT, AP, LPSE    Most recent blood counts (CBC) review  Lab Results   Component Value Date    WBC 10.3 10/19/2022    HGB 9.3 (L) 10/28/2022    HCT 32.5 (L) 10/19/2022    MCV 99.7 10/19/2022     10/19/2022     Most recent chemistry (BMP) test review   Lab Results   Component Value Date/Time     10/19/2022 02:22 PM    K 4.0 10/19/2022 02:22 PM    CL 101 10/19/2022 02:22 PM    CO2 29 10/19/2022 02:22 PM    BUN 15 10/19/2022 02:22 PM    CREATININE 0.80 10/28/2022 11:15 AM    CREATININE 1.20 10/19/2022 02:22 PM    GLUCOSE 156 10/19/2022 02:22 PM    CALCIUM 9.2 10/19/2022 02:22 PM      Most recent Liver enzyme test review  Lab Results   Component Value Date    ALT 31 12/05/2022    AST 28 10/19/2022    ALKPHOS 116 10/19/2022    BILITOT 0.5 10/19/2022     Most recent coagulation (coags) review  @lastinr@  Lab Results   Component Value Date/Time    INR 1.3 07/30/2022 09:53 AM    APTT 63.6 08/02/2022 05:41 AM    APTT 24.3 03/04/2022 03:57 PM       Diabetic assessment  Hemoglobin A1C   Date Value Ref Range Status   08/10/2022 6.0 (H) 4.8 - 5.6 % Final       Nutritional assessment screen to assess wound healing ability:  Lab Results   Component Value Date    LABALBU 3.2 10/19/2022     No results found for: TP, ALBR, ALB    Xray Result (most recent):  XR PELVIS (1-2 VIEWS) 10/18/2022    Narrative  Pelvis single view 10/18/2022    CLINICAL HISTORY: Fractured lower back. FINDINGS:  2 similar frontal views of the pelvis are submitted for evaluation. No abnormal  widening is seen of the sacroiliac joints or pubic symphysis. No acute displaced  fracture is seen of the pelvic ring. Assessment of the lower lumbar spine and  proximal femurs is limited by the lack of lateral views. Kyphoplasty repair is  seen at the L4 vertebral body level with some vertebral body height loss  adjustment at this level. No clear acute changes of the bilateral hips are seen. Relatively advanced atherosclerotic changes are seen of regional arterial  structures. Impression  1. Kyphoplasty repair at L4. No acute displaced fracture is seen of the bony  pelvis. CT Result (most recent):  CT FNA WITH IMAGING GUIDED 09/14/2022    Narrative  PROCEDURE: CT-guided Large Bore Needle Aspiration and Core Biopsy. Procedural Personnel  Attending physician(s): Skip Jane M.D.     Pre-procedure diagnosis: 71-year-old man with history of vertebral compression  fracture status post kyphoplasty 7/19/2022. Postoperative complications include  probable osteomyelitis and perivertebral abscess/psoas muscle abscess. CT-guided perivertebral fluid aspiration 8/1/2022 returned 2-3 cc of purulent  fluid that demonstrated high white blood cell count but no bacterial growth in  culture. Patient has completed 6 weeks of antibiotic therapy yet continues to  complain of low back pain and hip pain. Patient is currently in an antibiotic  holiday. Repeat MRI 9/6/2022 demonstrates possible small fluid collection  versus phlegmon adjacent to the L4 vertebral body or within the adjacent psoas  muscle. There is multilevel lumbar degenerative disc disease. Eliquis and  Plavix have been held in anticipation of fluid aspiration today. Today, the  patient complains only of hip pain. Post-procedure diagnosis: Same  Indication: Obtain specimen for microbiology evaluation  Previous biopsy of same target (QCDR): Yes    Complications: No immediate complications. Impression  CT-guided Needle aspiration and core biopsy of of the phlegmon  adjacent to the left side of L3-L4 disc and L4 vertebral body. The 1st needle  placement adjacent to the L4 vertebral body returned no fluid, therefore a core  biopsy was obtained. The 2nd needle placement adjacent to the L3-L4 vertebral  body initially returned no fluid, and therefore a few cc of inoculum saline was  administered and then aspirated. Plan:  Specimen(s) sent to the Microbiology department for evaluation. One hour  bedrest.  Resume Plavix tomorrow. Resume Eliquis in 48 hours. _______________________________________________________________  Technique: All CT scans at this facility are performed using dose  reduction/dose modulation techniques, as appropriate to the performed exam,  including the following:  Automated Exposure Control;  Adjustment of the MA  and/or kF according to patient size (this includes techniques or standardized  protocols for targeted exams where dose is matched to indication/reason for  exam); and Use of Iterative Reconstruction Technique. PROCEDURE SUMMARY:  - Percutaneous CT-guided Coaxial Core Needle Biopsy and Fluid Aspiration    PROCEDURE DETAILS:    Pre-procedure  Reference imaging for biopsy target: MRI 9/6/2022, 7/29/2022, 6/2/2022 as well  as CT 7/27/2022    Consent:  Informed written and oral consent for the procedure was obtained after  explanation of risks (including, but not limitted to:  hemorrhage, infection,  visceral injury, nondiagnostic biopsy) benefits and alternatives. The patient's  questions were answered to their satisfaction. They stated understanding and  requested that we proceed. Final verification:  A time-out identifying the patient and planned procedure  was performed prior to this procedure. Preparation: (MIPS) Maximal sterile barrier technique (including:  cap, mask,  sterile gown, sterile gloves, sterile sheet, hand hygiene, and cutaneous  antisepsis) was used. Anesthesia/sedation  Level of anesthesia/sedation: Moderate sedation (conscious sedation)  Anesthesia/sedation administered by: Independent trained observer under  attending supervision with continuous monitoring of the patient?s level of  consciousness and physiologic status  Total intra-service sedation time (minutes): 27    Imaging prior to biopsy  The patient was positioned prone. Initial imaging was performed using  noncontrast CT. Biopsy target:  - Maximal diameter (cm): 0.7 -  Location: soft tissue density to the left of the L4 vertebral body. Other findings: Less distinct soft tissue density to the left of the L3-L4 disc  space. Biopsy  Local anesthesia was administered. Under CT guidance, the coaxial biopsy system  was advanced to the target and aspiration was attempted but no fluid was  returned.   Therefore, through the coaxial needle, an 18-gauge core biopsy was  obtained and then placed into a sterile container. Coaxial needle: 17 gauge  Core needle biopsy device: Camiant  Core needle size: 18 gauge  Number of core specimens: 1    Attention was then turned to the subtle soft tissue density adjacent to the  L3-L4 disc. The coaxial needle was reassembled and then redirected using  intermittent CT scan imaging to this 2nd soft tissue density. The 17-gauge  coaxial needle was aspirated, returning no fluid. Therefore, 3 cc of inoculum  saline was administered and then aspirated as specimen. Large bore needle aspiration device: Camiant  Fine needle size: 17-gauge  Number of FNA specimens: 1    Needle removal  The biopsy needle was removed and a sterile dressing was applied. Tract embolization: None    Imaging following biopsy  Immediate post-biopsy imaging was not performed. Imaging modality: Not applicable. Post-biopsy imaging findings: Not applicable    Contrast  Contrast agent: None  Contrast volume (mL): 0    Radiation Dose  CT dose length product (mGy-cm):  957.09    Additional Details  Additional description of procedure: None  Equipment details: None  Specimens removed: Microbiology  Estimated blood loss (mL): Less than 10  Standardized report: [email or web address]    Attestation  Signer name: Reanna Yu M.D. I attest that I personally performed the entire procedure. I reviewed the stored  images and agree with the report as written. 07/06/22    VAS DUP LOWER EXT ARTERIES BILATERAL W JOVANA 07/07/2022  7:57 AM (Final)    Interpretation Summary    Right lower extremity: The JOVANA (ankle-brachial index) is 0.78 and is abnormal. The lower extremity arterial duplex reveals an occlusion of the proximal superficial femoral artery with reconstitution at the distal proximal superficial femoral artery. There is monophasic flow in the GILBERTO, PTA and peroneal arteries at the ankle. The great toe pressure is 64mmHg.     Left lower extremity: Right lower extremity: The JOVANA (ankle-brachial index) is 0.60 and is abnormal. The lower extremity arterial duplex reveals an occlusion of the proximal superficial femoral artery with reconstitution at the below knee popliteal artery. There is monophasic flow in the GILBERTO, PTA and peroneal arteries at the ankle. The great toe pressure is 63mmHg. The abdominal aorta was evaluated and measured 2.8cm x 2.9cm at its maximum diameter, no aneurysm visualized on limited evaluation of abdominal aorta. Signed by: Betsy Nicholson MD on 7/7/2022  7:57 AM        Admission date (for inpatients): 12/15/2022   Day of Surgery  * No procedures listed *    Procedure:  Skin Substitute Application    Performed by: Ana Gallagher MD  Ulcer Type: arterial and diabetic  Consent obtained: Yes  Time out taken: Yes    Product Utilized:  PuraPly AM 1.6 sq/cm  Fenestrated at the time of procedure: Yes  Instrument(s) utilized during the procedure: #15 surgical blade    Skin Substitute was Applied to Ulcer Number(s):    Ulcer #: 1  Total Surface Area of Ulcer(s) Covered 1.5 sq/cm  Was the Product Layered  No   Amount of Product Applied 1.6 sq/cm   Amount of Product Wasted 0 sq/cm   Reason for Waste: N/A. Skin Substitute was surgically fixated and secured with Other: silicone dressing.     Wound 09/09/22 Heel Left;Lateral (Active)   Wound Image   12/15/22 1437   Wound Etiology Diabetic Martinez 2 12/15/22 1437   Dressing Status Old drainage noted 12/15/22 1437   Wound Cleansed Cleansed with saline 12/15/22 1437   Dressing/Treatment Collagen with Ag 12/08/22 1337   Offloading for Diabetic Foot Ulcers Other (comment) 12/08/22 1337   Wound Length (cm) 1 cm 12/15/22 1437   Wound Width (cm) 1.5 cm 12/15/22 1437   Wound Depth (cm) 0.1 cm 12/15/22 1437   Wound Surface Area (cm^2) 1.5 cm^2 12/15/22 1437   Change in Wound Size % (l*w) 75 12/15/22 1437   Wound Volume (cm^3) 0.15 cm^3 12/15/22 1437   Wound Healing % 94 12/15/22 1437   Wound Assessment Pink/red; Hyper granulation tissue;Slough 12/15/22 1437   Drainage Amount Small 12/15/22 1437   Drainage Description Serosanguinous 12/15/22 1437   Odor None 12/15/22 1437   Joanna-wound Assessment Hyperkeratosis (callous) 12/15/22 1437   Margins Attached edges 10/31/22 1115   Wound Thickness Description not for Pressure Injury Full thickness 12/15/22 1437   Number of days: 97       Wound 09/13/22 Buttocks moisture skin damage (Active)   Number of days: 93      Procedural Pain: 0/10   Post Procedural Pain: 0 / 10  Response to Treatment: Patient tolerated procedure well with no complaints of pain. 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 *  Resolved Problems:    * No resolved hospital problems.  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 ([address] 10/24/2022     Priority: High    Antiplatelet or antithrombotic long-term use 10/24/2022     Priority: High     Eliquis and Plavix      Chest pain 12/05/2018     Priority: High    Lumbar discitis 09/08/2022     Priority: Medium    Psoas muscle abscess ([address] 09/08/2022     Priority: Medium    Physical debility 08/26/2022     Priority: Medium    General weakness 07/27/2022     Priority: Medium Acute cystitis without hematuria 07/27/2022     Priority: Medium    Low back pain without sciatica 07/27/2022     Priority: Medium    Back pain 07/27/2022     Priority: Medium    DNI (do not intubate) 07/27/2022     Priority: Medium    Elevated liver enzymes 07/27/2022     Priority: Medium    Anemia 07/27/2022     Priority: Medium    Hyponatremia 07/27/2022     Priority: Medium    ICD (implantable cardioverter-defibrillator) in place 06/16/2022     Priority: Medium    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, vertebra(e), init ([facility] 07/07/2022     Priority: Low     Added automatically from request for surgery [id number]      Atrial fibrillation ([facility] 03/04/2022     Priority: Low    Ventricular tachycardia 03/04/2022     Priority: Low    VT (ventricular tachycardia) 03/04/2022     Priority: Low    Acute metabolic encephalopathy 98/74/3318     Priority: Low    Irregular heart beat 03/02/2021     Priority: Low    PVC's (premature ventricular contractions) 03/02/2021     Priority: Low    Elevated troponin 12/31/2020     Priority: Low    Toe laceration 12/09/2019     Priority: Low    Type 2 diabetes with nephropathy ([facility] 06/17/2019     Priority: Low    Hypoxia 02/08/2019     Priority: Low    Sepsis ([facility] 02/08/2019     Priority: Low    Abnormal nuclear cardiac imaging test 11/27/2018     Priority: Low    Cigarette nicotine dependence in remission 08/20/2018     Priority: Low    Chronic pain of right knee 12/14/2017     Priority: Low    Obstructive sleep apnea 08/11/2017     Priority: Low    Intermittent spinal claudication ([facility] 06/13/2017     Priority: Low    Pulmonary emphysema ([facility] 11/17/2016     Priority: Low    H/O endarterectomy 11/10/2015     Priority: Low    Complicated wound infection 11/10/2015     Priority: Low    Allergic rhinitis 11/10/2015     Priority: Low    Hiatal hernia 11/10/2015     Priority: Low    Diabetic neuropathy ([facility] 11/10/2015     Priority: Low    Osteoarthritis 11/10/2015     Priority: Low Encounter for long-term (current) drug use 11/10/2015     Priority: Low    Benign neoplasm of colon 11/10/2015     Priority: Low    PAD (peripheral artery disease) ([facility] 11/10/2015     Priority: Low    Asthma 11/10/2015     Priority: Low    Orthostatic hypotension 11/10/2015     Priority: Low    Hyperlipidemia 11/10/2015     Priority: Low    S/P angioplasty with stent 11/10/2015     Priority: Low    COPD (chronic obstructive pulmonary disease) ([facility] 04/10/2015     Priority: Low    Arterial degeneration 11/05/2014     Priority: Low     11/6/14 (Dr Rosalee Yanes) 1. Bleeding from right groin wound. 2. Disruption of right common femoral artery patch anastomosis and   possible arterial infection. Normocytic anemia 10/23/2014     Priority: Low    MINI (acute kidney injury) ([facility] 09/15/2014     Priority: Low    DM (diabetes mellitus) type II, controlled, with peripheral vascular disorder ([facility] 09/11/2014     Priority: Low    Atherosclerosis of native arteries of extremity with intermittent claudication ([facility] 08/15/2014     Priority: Low     9/11/14 (Dr Rosalee Yanes) Bilateral common femoral endarterectomies. Personal history of tobacco use, presenting hazards to health 02/12/2013     Priority: Low    Asbestos exposure 02/12/2013     Priority: Low    HTN (hypertension) 10/12/2011     Priority: Low    Severe obesity (BMI 35.0-39. 9) with comorbidity ([facility] 10/12/2011     Priority: Low    Sleep apnea 10/12/2011     Priority: Low    Ischemic cardiomyopathy 10/12/2011     Priority: Low    Coronary artery disease involving native coronary artery of native heart without angina pectoris 10/12/2011     Priority: Low           Plan:     Number and Complexity of Problems addressed and   Risks of complications and/or morbidity of management    Treatment Note please see attached Discharge Instructions  Any procedures done today in the wound center (if documented in a separate note) in [facility] are made part of this record by reference  Written patient dismissal instructions given to patient or POA. 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 He has had an ulcer on the left heel for several months. He has been debilitated following back surgery but is also debilitated from multiple medical problems including cardiac disease, peripheral vascular disease, and diabetes. He is anticoagulated on Eliquis and Plavix. He has had femoral endarterectomies in the past and is now followed by Dr. Quinton Rizzo. JOVANA in July was abnormal and has not been reevaluated since deterioration of his tissue. He is scheduled for repeat JOVANA in January and we will consult vascular surgery for earlier evaluation. Vascular consultation to follow as needed. He has not been offloading despite having work boots. There is clearly a pressure component. There is no mirror lesion on the right heel and may reflect his discrepancy in blood supply. He does describe some episodes that could be rest pain though it does not drop his legs for improvement. This may also be limited by his debility. We will not do debridement today. We will dress with Betadine and an absorbent pad and follow-up in 1 week. Offloading was emphasized. He returns on 10/31/2022. He underwent arteriography but is not a candidate for percutaneous intervention. He is being set up for formal bypass in November. Therapy is having him walk on his foot and offloading was again stressed. Current dressing remains Betadine. I will see him back in 1.5 weeks which will be before his bypass surgery. He returns on 11/10/2022.   He is improved and has completed antibiotics. Current dressing is Betadine. He will see vascular surgery on Monday to schedule bypass surgery. He returns on 12/1/2022. He was seen by vascular surgery who have set him up for femoral to distal bypass in January, but feel it may not be necessary as he continues to heal.  This is probably secondary to better offloading. He was also seen by ID who also feel that the bypass should be avoided if possible since he is continuing to heal.  I agree with both specialists that he is continuing to heal and we will move forward with adjuncts to healing up. We will start with skin substitutes. I will order PuraPly AM for next visit. This would be the best place to start when we cannot perform aggressive debridement due to his vascular status. We will then transition to a growth factor product. Returns on 12/8/2022. There continues to be some improvement. Still some issues with understanding offloading. PuraPly AM #1 was applied today and well-tolerated. We will order a graft for next week. He returns on 12/15/2022. There is some improvement. They have better understanding of offloading. PuraPly AM #2 was applied today and well-tolerated. He will return next week for dressing change and collagen dressing and I will see him back in 2 weeks with another graft. Wound Care  Orders Placed This Encounter   Procedures    Ambulatory Referral to DME     Referral Priority:   Routine     Referral Reason:   Specialty Services Required     Number of Visits Requested:   1       Return Appointment:   2 weeks with Vicenta Alarcon MD  Dressing change in 1 week        Instructions:  Left Lateral Heel:  Cleanse with Normal Saline  Puraply applied to wound base. Lot #: [id number]. 1.1J; Exp date: 11/16/2024  Secure with Mepilex transfer and ABD. Wrap with kerlix and Coban to level of the ankle lightly to secure graft only. Dressing change in 1 week at wound center.   Change dressing and may apply plain collagen cover with ABD and rolled gauze until follow up with MD the following week        Plan to apply Puraply (1.6 cm disc) at next visit ( in 2 weeks)     Float Heel When Patient in [address]  When in bed, with wear Rooke Boot or float heel on pillow  Hold home health for 1 week due to placement of Puraply. Level of MDM (2/3 elements below)  Number and Complexity of Problems Addressed Amount and/or Complexity of Data to be Reviewed and Analyzed  *Each unique test, order, or document contributes to the combination of 2 or combination of 3 in Category 1 below. Risk of Complications and/or Morbidity or Mortality of pt Management     13163  61167 SF Minimal  ?1self-limited or minor problem Minimal or none Minimal risk of morbidity from additional diagnostic testing or Rx   52295  12043 Low Low  ? 2or more self-limited or minor problems;    or  ? 1stable chronic illness;    or  ?1acute, uncomplicated illness or injury   Limited  (Must meet the requirements of at least 1 of the 2 categories)  Category 1: Tests and documents   ? Any combination of 2 from the following:  ?Review of prior external note(s) from each unique source*;  ?review of the result(s) of each unique test*;   ?ordering of each unique test*    or   Category 2: Assessment requiring an independent historian(s)  (For the categories of independent interpretation of tests and discussion of management or test interpretation, see moderate or high) Low risk of morbidity from additional diagnostic testing or treatment     76225  35705 Mod Moderate  ? 1or more chronic illnesses with exacerbation, progression, or side effects of treatment;    or  ?2or more stable chronic illnesses;    or  ?1undiagnosed new problem with uncertain prognosis;    or  ?1acute illness with systemic symptoms;    or  ?1acute complicated injury   Moderate  (Must meet the requirements of at least 1 out of 3 categories)  Category 1: Tests, documents, or independent historian(s)  ? Any combination of 3 from the following:   ?Review of prior external note(s) from each unique source*;  ?Review of the result(s) of each unique test*;  ?Ordering of each unique test*;  ?Assessment requiring an independent historian(s)    or  Category 2: Independent interpretation of tests   ? Independent interpretation of a test performed by another physician/other qualified health care professional (not separately reported);     or  Category 3: Discussion of management or test interpretation  ? Discussion of management or test interpretation with external physician/other qualified health care professional/appropriate source (not separately reported)   Moderate risk of morbidity from additional diagnostic testing or treatment  Examples only:  ?Prescription drug management - advanced wound care prescription Rx wound care products  (eg Iodosorb, hydrofera, silvadene, collagen, puracol plus, optiloc, biologics - placenta, Theraskin, Apligraf). Note also that if home health care is involved, they will not apply topical therapies without a prescription/order from physician      ? Decision regarding minor surgery with identified patient or procedure risk factors  ? Decision regarding elective major surgery without identified patient or procedure risk factors   ? Diagnosis or treatment significantly limited by social determinants of health       80505  63281 High High  ? 1or more chronic illnesses with severe exacerbation, progression, or side effects of treatment;    or  ?1 acute or chronic illness or injury that poses a threat to life or bodily function   Extensive  (Must meet the requirements of at least 2 out of 3 categories)  Category 1: Tests, documents, or independent historian(s)  ? Any combination of 3 from the following:   ?Review of prior external note(s) from each unique source*;  ?Review of the result(s) of each unique test*;   ?Ordering of each unique test*;   ?Assessment requiring an independent historian(s)    or   Category 2: Independent interpretation of tests   ? Independent interpretation of a test performed by another physician/other qualified health care professional (not separately reported);     or  Category 3: Discussion of management or test interpretation  ? Discussion of management or test interpretation with external physician/other qualified health care professional/appropriate source (not separately reported)   High risk of morbidity from additional diagnostic testing or treatment  Examples only:  ?Drug therapy requiring intensive monitoring for toxicity  ? Decision regarding elective major surgery with identified patient or procedure risk factors  ? Decision regarding emergency major surgery  ? Decision regarding hospitalization  ? Decision not to resuscitate or to de-escalate care because of poor prognosis                 I have personally performed a face-to-face diagnostic evaluation and management  service on this patient. I have independently seen the patient. I have independently obtained the above history from the patient/family. I have independently examined the patient with above findings. I have independently reviewed data/labs for this patient and developed the above plan of care (MDM).       Electronically signed by Patricia Marroquin MD on 12/15/2022 at 8:00 PM

## 2022-12-20 RX ORDER — CARVEDILOL 25 MG/1
25 TABLET ORAL 2 TIMES DAILY WITH MEALS
Qty: 60 TABLET | Refills: 11 | Status: SHIPPED | OUTPATIENT
Start: 2022-12-20

## 2022-12-20 NOTE — TELEPHONE ENCOUNTER
Medication Refill Request      Name of Medication : carvedilol      Strength of Medication: 25 mg      Directions: 2 times daily      30 day or 90 day supply: 30      Preferred Pharmacy: [facility]    Additional Information For Provider:

## 2022-12-21 ENCOUNTER — TELEPHONE (OUTPATIENT)
Dept: INTERNAL MEDICINE CLINIC | Facility: CLINIC | Age: 77
End: 2022-12-21

## 2022-12-21 NOTE — TELEPHONE ENCOUNTER
Please call 12/22/22 and speak to him directly. Ask about rib pain and cough--duration, whether or not has fever or chills. Thanks.   Dung Arambula

## 2022-12-21 NOTE — TELEPHONE ENCOUNTER
[address] [facility]      Name of the Nurse Calling and Facility: [id number]. Number to Reach the Nurse: [phone]          Reason For Call: Had a fall on Monday 12/19 could not get up , and neighbor came bear hugged him off the floor and pulled him up and felt a pop sensation on Right side ribs and no bruising only hurts when he coughs. Coughing and has sputum that is green, and he has some wheezing bilaterally in lungs.

## 2022-12-22 RX ORDER — PREDNISONE 10 MG/1
TABLET ORAL
Qty: 21 EACH | Refills: 0 | Status: SHIPPED | OUTPATIENT
Start: 2022-12-22

## 2022-12-22 NOTE — TELEPHONE ENCOUNTER
Steroids provided to start if no better in then next 3-4 days. Keep a low threshold for contacting the office with worsening symptoms.

## 2022-12-22 NOTE — TELEPHONE ENCOUNTER
Spoke with pt's wife - his rib area is starting to feel better - just sore now - wife thinks he fell due to having bed slippers on and doing exercises. Pt still complains with a cough with thick sputum - he is wheezing some and has some shortness of breath - he currently does not have chills or a fever.

## 2022-12-23 ENCOUNTER — TELEPHONE (OUTPATIENT)
Dept: PULMONOLOGY | Age: 77
End: 2022-12-23

## 2022-12-23 DIAGNOSIS — J44.9 COPD, SEVERE (HCC): Primary | ICD-10-CM

## 2022-12-23 RX ORDER — ALBUTEROL SULFATE 2.5 MG/3ML
SOLUTION RESPIRATORY (INHALATION)
Qty: 360 ML | Refills: 11 | Status: ON HOLD | OUTPATIENT
Start: 2022-12-23

## 2022-12-23 NOTE — TELEPHONE ENCOUNTER
LOV 10/27/22 with Araceli Hatchet, NP--- COPD    Allergies: acetaminophen, azithromycin, morphine, oxaprozin, oxycodone    Pt c/o congestion for about a week. Productive cough with green, thick sputum. Is wheezing a little, albuterol is helping. Taking Advair and Mucinex. PCP gave steroids yesterday. No fever or chills. Reviewed albuterol inhaler usage with pt for use over the weekend. Would like to have nebulizer treatments. Forwarding for order for nebulizer and medication. Pt aware that office is closed Monday, but someone will reach back out Tuesday.

## 2022-12-23 NOTE — TELEPHONE ENCOUNTER
Triage:    Ok for nebulizer  - dx COPD, J44.9, I sent order in to pharmacy for albuterol. Please Notify him that he can use Albuterol inhaler or nebulizer 4 times daily as needed. We will send order for nebulizer today but unsure when it will be processed due to lateness of day and long holiday weekend, he will need urgent care if not responding to albuterol inhaler until receives nebulizer. Thanks.     Remy,     I have sent RX for nebulizer to his pharmacy, please send order for nebulizer bill to medicare part B, COPD, j44.9

## 2022-12-23 NOTE — TELEPHONE ENCOUNTER
TRIAGE CALL      Complaint: cough/congestion  Cough: yes  Productive:  yes  Bloody Sputum:  no  Increased SOB/Wheezing:  wheezing  Duration: started this week   Fever/Chills: no  OTC Meds tried: Musinex     Wife Mckenzie Cosbyping says that last time he got like this Donrishabh Israel gave him a breathing treatment and it helped a lot

## 2022-12-27 NOTE — TELEPHONE ENCOUNTER
Contacted patient home number and spoke with spouse, understands nebulizer will be arranged by DME, using albuterol inhaler, concerned he still has a lot of congestion and asking for sooner appt than Feb. Advised work in appt can be arranged but should keep Feb routine f/u; scheduled around other MD appts this week for Friday 12/30 w/ NP.

## 2022-12-28 ENCOUNTER — TELEPHONE (OUTPATIENT)
Dept: PULMONOLOGY | Age: 77
End: 2022-12-28

## 2022-12-28 NOTE — PROGRESS NOTES
He is a 65-year-old male with history of severe COPD who was seen as a work in visit today secondary to worsening shortness of breath, cough/chest congestion and noted to have O2 saturation in the mid 80s. States that she placed CPAP on him and oxygen saturation improved to 91% with interval improvement in his mentation. Was advised appropriately by triage and to seek emergent medical care if he had worsening symptoms or maintaining saturation below 90% consistently. Patient contacted us 12/23/2022 requesting order for nebulizer as he had noticed good response to this point given in office previously. Note that steroids were prescribed recently by primary provider. DIAGNOSTICS:        Spirometry 5/2013: FEV1 1.70 Final FEV1 % Pred 46 % Final FVC 2.55 Final FVC % Pred 52 % Final FEV1/F; Significant  interval decline in FVC and FEV (previously 67% and 58% of predicted, or 3.25 and 2.16 L respectively). Chest CT 10/2014 - Negative for pulmonary embolus; basilar atelectasis. Stable left adrenal mass. Spirometry:  10/12/2015 - Moderate restrictive defect, significant interval improvement in FVC  and FEV1. Spirometry 4/11/0216 - moderate restrictive and obstructive defects, no significant interval change. CXR 10/2016-linear density in both lung bases. Could reflect some atelectasis and/or scarring  Spirometry 11/17/2016 - moderate restrictive defect. Spirometry 2/14/2018-moderate obstructive and restrictive defects, no significant interval change. CXR 2/8/2019-suboptimal exposure Technique. Atelectasis or consolidation left lower lobe. Spirometry 2/20/2019 interval decline in FVC, no significant change in FEV1  CXR 4/9/2019-clearing of left lower lobe infiltrate, no acute process. Spirometry 10/7/2019-moderately severe restrictive defect, no significant change. Ambulatory oximetry 3/1/2021-baseline saturation 96% room air at rest, 86 room air with ambulation. 92% on 2 lpm w/ exertion.   CXR 5/11/2021-overall improvement in bilateral airspace opacities, mild bibasilar atelectasis/infiltrates present. CXR 9/1/2021-clear lungs, no acute cardiopulmonary process. Spirometry 3/1/2022-severe obstructive defect with decreased FVC, interval improvement in FVC. Chest CT 3/9/2022-multilobar atypical infectious or inflammatory bronchiolitis primarily affecting the small airways involving the bilateral upper lobes and left lower lobe. PCXR 7/27/2022-pulmonary infiltrate in RUL and mid to lower left lung Has mostly cleared. Atelectasis or scarring right lung base appears stable. Stable cardiomegaly. ECHO 7/30/2022-EF 40-45%. Normal diastolic function. RVSP estimated 27 mmHg. Mild to moderate TR, mild MR. CXR 8/12/2022-improved bibasilar opacities with mild residual remaining on the right. CXR 8/30/2022-early pulmonary edema favored. CXR 9/1/2022-stable cardiomegaly. No airspace consolidation. Mild prominence of interstitial markings which is unchanged.

## 2022-12-28 NOTE — TELEPHONE ENCOUNTER
Spouse called to report concern with increased sob & congestion, this morning at waking patient was sluggish and somewhat lethargic; as symptoms continued she checked O2 level and was in mid 80s, so she put his CPAP on him even though he is awake, seems to be more oriented and sats at 91%; she feels like he is having increased congestion despite using albuterol more and advair as ordered, has not received nebulizer yet; asking for next steps to hellp get to appt on Friday; advised that any time O2 below 90% for more than 5 min should seek emergency tx, she is willing to get patient to ED but will have to leave him to go obtain their vehicle, advised EMS would be more predictable, notes she will call them if worsens, moved f/u appt up from Friday to tomorrow am.

## 2022-12-29 ENCOUNTER — HOSPITAL ENCOUNTER (INPATIENT)
Age: 77
LOS: 8 days | Discharge: HOME | End: 2023-01-06
Attending: EMERGENCY MEDICINE | Admitting: INTERNAL MEDICINE
Payer: MEDICARE

## 2022-12-29 ENCOUNTER — APPOINTMENT (OUTPATIENT)
Dept: GENERAL RADIOLOGY | Age: 77
End: 2022-12-29
Payer: MEDICARE

## 2022-12-29 ENCOUNTER — OFFICE VISIT (OUTPATIENT)
Dept: PULMONOLOGY | Age: 77
End: 2022-12-29
Payer: MEDICARE

## 2022-12-29 ENCOUNTER — APPOINTMENT (OUTPATIENT)
Dept: CT IMAGING | Age: 77
End: 2022-12-29
Payer: MEDICARE

## 2022-12-29 VITALS
OXYGEN SATURATION: 88 % | TEMPERATURE: 97.1 F | BODY MASS INDEX: 30.48 KG/M2 | WEIGHT: 225 LBS | HEART RATE: 62 BPM | HEIGHT: 72 IN | SYSTOLIC BLOOD PRESSURE: 146 MMHG | DIASTOLIC BLOOD PRESSURE: 74 MMHG

## 2022-12-29 DIAGNOSIS — Z95.810 ICD (IMPLANTABLE CARDIOVERTER-DEFIBRILLATOR) IN PLACE: ICD-10-CM

## 2022-12-29 DIAGNOSIS — J44.1 COPD EXACERBATION (HCC): ICD-10-CM

## 2022-12-29 DIAGNOSIS — R82.90 MALODOROUS URINE: ICD-10-CM

## 2022-12-29 DIAGNOSIS — R09.02 HYPOXEMIA: Primary | ICD-10-CM

## 2022-12-29 DIAGNOSIS — Z79.01 LONG TERM CURRENT USE OF ANTICOAGULANT: ICD-10-CM

## 2022-12-29 DIAGNOSIS — I48.0 PAROXYSMAL ATRIAL FIBRILLATION (HCC): ICD-10-CM

## 2022-12-29 DIAGNOSIS — I47.20 VT (VENTRICULAR TACHYCARDIA): ICD-10-CM

## 2022-12-29 DIAGNOSIS — F17.211 CIGARETTE NICOTINE DEPENDENCE IN REMISSION: ICD-10-CM

## 2022-12-29 DIAGNOSIS — R53.83 LETHARGY: ICD-10-CM

## 2022-12-29 DIAGNOSIS — R78.81 BACTEREMIA: ICD-10-CM

## 2022-12-29 DIAGNOSIS — I50.9 HEART FAILURE (HCC): ICD-10-CM

## 2022-12-29 DIAGNOSIS — Z78.9 DNI (DO NOT INTUBATE): ICD-10-CM

## 2022-12-29 DIAGNOSIS — G47.33 OBSTRUCTIVE SLEEP APNEA (ADULT) (PEDIATRIC): ICD-10-CM

## 2022-12-29 DIAGNOSIS — I50.9 CONGESTIVE HEART FAILURE, UNSPECIFIED HF CHRONICITY, UNSPECIFIED HEART FAILURE TYPE (HCC): ICD-10-CM

## 2022-12-29 DIAGNOSIS — R32 URINARY INCONTINENCE, UNSPECIFIED TYPE: ICD-10-CM

## 2022-12-29 DIAGNOSIS — R05.8 COUGH PRODUCTIVE OF PURULENT SPUTUM: ICD-10-CM

## 2022-12-29 DIAGNOSIS — R09.02 HYPOXIA: ICD-10-CM

## 2022-12-29 DIAGNOSIS — J44.1 COPD EXACERBATION (HCC): Primary | ICD-10-CM

## 2022-12-29 DIAGNOSIS — Z09 HOSPITAL DISCHARGE FOLLOW-UP: ICD-10-CM

## 2022-12-29 PROBLEM — J11.1 INFLUENZA: Status: ACTIVE | Noted: 2022-12-29

## 2022-12-29 LAB
ALBUMIN SERPL-MCNC: 2.9 G/DL (ref 3.2–4.6)
ALBUMIN/GLOB SERPL: 0.8 (ref 0.4–1.6)
ALP SERPL-CCNC: 69 U/L (ref 50–136)
ALT SERPL-CCNC: 46 U/L (ref 12–65)
AMMONIA PLAS-SCNC: <10 UMOL/L (ref 11–32)
ANION GAP SERPL CALC-SCNC: 8 MMOL/L (ref 2–11)
ARTERIAL PATENCY WRIST A: POSITIVE
AST SERPL-CCNC: 25 U/L (ref 15–37)
BASE EXCESS BLD CALC-SCNC: 6.2 MMOL/L
BASOPHILS # BLD: 0 K/UL (ref 0–0.2)
BASOPHILS NFR BLD: 0 % (ref 0–2)
BDY SITE: ABNORMAL
BILIRUB SERPL-MCNC: 0.7 MG/DL (ref 0.2–1.1)
BILIRUB UR QL: NEGATIVE
BUN SERPL-MCNC: 13 MG/DL (ref 8–23)
CALCIUM SERPL-MCNC: 8.7 MG/DL (ref 8.3–10.4)
CHLORIDE SERPL-SCNC: 101 MMOL/L (ref 101–110)
CO2 SERPL-SCNC: 29 MMOL/L (ref 21–32)
CREAT SERPL-MCNC: 0.8 MG/DL (ref 0.8–1.5)
DIFFERENTIAL METHOD BLD: ABNORMAL
EKG ATRIAL RATE: 61 BPM
EKG DIAGNOSIS: NORMAL
EKG P AXIS: 67 DEGREES
EKG P-R INTERVAL: 210 MS
EKG Q-T INTERVAL: 488 MS
EKG QRS DURATION: 100 MS
EKG QTC CALCULATION (BAZETT): 491 MS
EKG R AXIS: 90 DEGREES
EKG T AXIS: 95 DEGREES
EKG VENTRICULAR RATE: 61 BPM
EOSINOPHIL # BLD: 0 K/UL (ref 0–0.8)
EOSINOPHIL NFR BLD: 0 % (ref 0.5–7.8)
ERYTHROCYTE [DISTWIDTH] IN BLOOD BY AUTOMATED COUNT: 16.3 % (ref 11.9–14.6)
EST. AVERAGE GLUCOSE BLD GHB EST-MCNC: 114 MG/DL
FLUAV AG NPH QL IA: POSITIVE
FLUBV AG NPH QL IA: NEGATIVE
GAS FLOW.O2 O2 DELIVERY SYS: ABNORMAL
GLOBULIN SER CALC-MCNC: 3.7 G/DL (ref 2.8–4.5)
GLUCOSE BLD STRIP.AUTO-MCNC: 106 MG/DL (ref 65–100)
GLUCOSE BLD STRIP.AUTO-MCNC: 134 MG/DL (ref 65–100)
GLUCOSE SERPL-MCNC: 88 MG/DL (ref 65–100)
GLUCOSE UR QL STRIP.AUTO: NEGATIVE MG/DL
HBA1C MFR BLD: 5.6 % (ref 4.8–5.6)
HCO3 BLD-SCNC: 31.3 MMOL/L (ref 22–26)
HCT VFR BLD AUTO: 29.5 % (ref 41.1–50.3)
HGB BLD-MCNC: 10 G/DL (ref 13.6–17.2)
IMM GRANULOCYTES # BLD AUTO: 0.1 K/UL (ref 0–0.5)
IMM GRANULOCYTES NFR BLD AUTO: 1 % (ref 0–5)
KETONES UR-MCNC: NEGATIVE MG/DL
LACTATE SERPL-SCNC: 1.1 MMOL/L (ref 0.4–2)
LEUKOCYTE ESTERASE UR QL STRIP: NEGATIVE
LYMPHOCYTES # BLD: 0.6 K/UL (ref 0.5–4.6)
LYMPHOCYTES NFR BLD: 9 % (ref 13–44)
MCH RBC QN AUTO: 31 PG (ref 26.1–32.9)
MCHC RBC AUTO-ENTMCNC: 33.9 G/DL (ref 31.4–35)
MCV RBC AUTO: 91.3 FL (ref 82–102)
MONOCYTES # BLD: 0.8 K/UL (ref 0.1–1.3)
MONOCYTES NFR BLD: 11 % (ref 4–12)
NEUTS SEG # BLD: 5.9 K/UL (ref 1.7–8.2)
NEUTS SEG NFR BLD: 79 % (ref 43–78)
NITRITE UR QL: NEGATIVE
NRBC # BLD: 0 K/UL (ref 0–0.2)
NT PRO BNP: ABNORMAL PG/ML
PCO2 BLD: 46.4 MMHG (ref 35–45)
PH BLD: 7.44 (ref 7.35–7.45)
PH UR: 5.5 (ref 5–9)
PLATELET # BLD AUTO: 214 K/UL (ref 150–450)
PMV BLD AUTO: 10.1 FL (ref 9.4–12.3)
PO2 BLD: 70 MMHG (ref 75–100)
POC FIO2: 2
POTASSIUM SERPL-SCNC: 3.1 MMOL/L (ref 3.5–5.1)
PROCALCITONIN SERPL-MCNC: <0.05 NG/ML (ref 0–0.49)
PROT SERPL-MCNC: 6.6 G/DL (ref 6.3–8.2)
PROT UR QL: 100 MG/DL
RBC # BLD AUTO: 3.23 M/UL (ref 4.23–5.6)
RBC # UR STRIP: NEGATIVE
SAO2 % BLD: 94.1 % (ref 95–98)
SARS-COV-2 RDRP RESP QL NAA+PROBE: NOT DETECTED
SERVICE CMNT-IMP: ABNORMAL
SODIUM SERPL-SCNC: 138 MMOL/L (ref 133–143)
SOURCE: NORMAL
SP GR UR: >1.03 (ref 1–1.02)
SPECIMEN SOURCE: ABNORMAL
SPECIMEN TYPE: ABNORMAL
T4 FREE SERPL-MCNC: 1.9 NG/DL (ref 0.78–1.46)
TROPONIN I SERPL HS-MCNC: 44.8 PG/ML (ref 0–14)
TROPONIN I SERPL HS-MCNC: 51.7 PG/ML (ref 0–14)
TSH W FREE THYROID IF ABNORMAL: 0.34 UIU/ML (ref 0.36–3.74)
UROBILINOGEN UR QL: 1 EU/DL (ref 0.2–1)
VIT B12 SERPL-MCNC: 957 PG/ML (ref 193–986)
WBC # BLD AUTO: 7.4 K/UL (ref 4.3–11.1)

## 2022-12-29 PROCEDURE — 99285 EMERGENCY DEPT VISIT HI MDM: CPT

## 2022-12-29 PROCEDURE — 83880 ASSAY OF NATRIURETIC PEPTIDE: CPT

## 2022-12-29 PROCEDURE — 3074F SYST BP LT 130 MM HG: CPT | Performed by: NURSE PRACTITIONER

## 2022-12-29 PROCEDURE — G8484 FLU IMMUNIZE NO ADMIN: HCPCS | Performed by: NURSE PRACTITIONER

## 2022-12-29 PROCEDURE — 83605 ASSAY OF LACTIC ACID: CPT

## 2022-12-29 PROCEDURE — 94640 AIRWAY INHALATION TREATMENT: CPT

## 2022-12-29 PROCEDURE — 96374 THER/PROPH/DIAG INJ IV PUSH: CPT

## 2022-12-29 PROCEDURE — 1123F ACP DISCUSS/DSCN MKR DOCD: CPT | Performed by: NURSE PRACTITIONER

## 2022-12-29 PROCEDURE — 6370000000 HC RX 637 (ALT 250 FOR IP): Performed by: INTERNAL MEDICINE

## 2022-12-29 PROCEDURE — 87077 CULTURE AEROBIC IDENTIFY: CPT

## 2022-12-29 PROCEDURE — 87150 DNA/RNA AMPLIFIED PROBE: CPT

## 2022-12-29 PROCEDURE — 6370000000 HC RX 637 (ALT 250 FOR IP): Performed by: EMERGENCY MEDICINE

## 2022-12-29 PROCEDURE — 36415 COLL VENOUS BLD VENIPUNCTURE: CPT

## 2022-12-29 PROCEDURE — 2580000003 HC RX 258: Performed by: INTERNAL MEDICINE

## 2022-12-29 PROCEDURE — 87040 BLOOD CULTURE FOR BACTERIA: CPT

## 2022-12-29 PROCEDURE — 3078F DIAST BP <80 MM HG: CPT | Performed by: NURSE PRACTITIONER

## 2022-12-29 PROCEDURE — 84145 PROCALCITONIN (PCT): CPT

## 2022-12-29 PROCEDURE — G8427 DOCREV CUR MEDS BY ELIG CLIN: HCPCS | Performed by: NURSE PRACTITIONER

## 2022-12-29 PROCEDURE — 2700000000 HC OXYGEN THERAPY PER DAY

## 2022-12-29 PROCEDURE — 80053 COMPREHEN METABOLIC PANEL: CPT

## 2022-12-29 PROCEDURE — 2580000003 HC RX 258: Performed by: EMERGENCY MEDICINE

## 2022-12-29 PROCEDURE — 83036 HEMOGLOBIN GLYCOSYLATED A1C: CPT

## 2022-12-29 PROCEDURE — 3023F SPIROM DOC REV: CPT | Performed by: NURSE PRACTITIONER

## 2022-12-29 PROCEDURE — 82803 BLOOD GASES ANY COMBINATION: CPT

## 2022-12-29 PROCEDURE — 6360000002 HC RX W HCPCS: Performed by: EMERGENCY MEDICINE

## 2022-12-29 PROCEDURE — 87635 SARS-COV-2 COVID-19 AMP PRB: CPT

## 2022-12-29 PROCEDURE — 87205 SMEAR GRAM STAIN: CPT

## 2022-12-29 PROCEDURE — 87186 SC STD MICRODIL/AGAR DIL: CPT

## 2022-12-29 PROCEDURE — 94760 N-INVAS EAR/PLS OXIMETRY 1: CPT

## 2022-12-29 PROCEDURE — 36600 WITHDRAWAL OF ARTERIAL BLOOD: CPT

## 2022-12-29 PROCEDURE — 82140 ASSAY OF AMMONIA: CPT

## 2022-12-29 PROCEDURE — 81003 URINALYSIS AUTO W/O SCOPE: CPT

## 2022-12-29 PROCEDURE — 84484 ASSAY OF TROPONIN QUANT: CPT

## 2022-12-29 PROCEDURE — 71045 X-RAY EXAM CHEST 1 VIEW: CPT

## 2022-12-29 PROCEDURE — 96375 TX/PRO/DX INJ NEW DRUG ADDON: CPT

## 2022-12-29 PROCEDURE — 4004F PT TOBACCO SCREEN RCVD TLK: CPT | Performed by: NURSE PRACTITIONER

## 2022-12-29 PROCEDURE — 84443 ASSAY THYROID STIM HORMONE: CPT

## 2022-12-29 PROCEDURE — 93005 ELECTROCARDIOGRAM TRACING: CPT | Performed by: EMERGENCY MEDICINE

## 2022-12-29 PROCEDURE — 87804 INFLUENZA ASSAY W/OPTIC: CPT

## 2022-12-29 PROCEDURE — 6360000002 HC RX W HCPCS: Performed by: INTERNAL MEDICINE

## 2022-12-29 PROCEDURE — 99214 OFFICE O/P EST MOD 30 MIN: CPT | Performed by: NURSE PRACTITIONER

## 2022-12-29 PROCEDURE — 84439 ASSAY OF FREE THYROXINE: CPT

## 2022-12-29 PROCEDURE — G8417 CALC BMI ABV UP PARAM F/U: HCPCS | Performed by: NURSE PRACTITIONER

## 2022-12-29 PROCEDURE — 82962 GLUCOSE BLOOD TEST: CPT

## 2022-12-29 PROCEDURE — 70450 CT HEAD/BRAIN W/O DYE: CPT

## 2022-12-29 PROCEDURE — 85025 COMPLETE CBC W/AUTO DIFF WBC: CPT

## 2022-12-29 PROCEDURE — 1100000000 HC RM PRIVATE

## 2022-12-29 PROCEDURE — 82607 VITAMIN B-12: CPT

## 2022-12-29 RX ORDER — ONDANSETRON 4 MG/1
4 TABLET, ORALLY DISINTEGRATING ORAL EVERY 8 HOURS PRN
Status: DISCONTINUED | OUTPATIENT
Start: 2022-12-29 | End: 2023-01-06 | Stop reason: HOSPADM

## 2022-12-29 RX ORDER — FUROSEMIDE 40 MG/1
40 TABLET ORAL DAILY
Status: DISCONTINUED | OUTPATIENT
Start: 2022-12-30 | End: 2023-01-06 | Stop reason: HOSPADM

## 2022-12-29 RX ORDER — MONTELUKAST SODIUM 10 MG/1
10 TABLET ORAL NIGHTLY
Status: DISCONTINUED | OUTPATIENT
Start: 2022-12-29 | End: 2023-01-06 | Stop reason: HOSPADM

## 2022-12-29 RX ORDER — POTASSIUM CHLORIDE 20 MEQ/1
40 TABLET, EXTENDED RELEASE ORAL ONCE
Status: DISCONTINUED | OUTPATIENT
Start: 2022-12-29 | End: 2023-01-06 | Stop reason: HOSPADM

## 2022-12-29 RX ORDER — IPRATROPIUM BROMIDE AND ALBUTEROL SULFATE 2.5; .5 MG/3ML; MG/3ML
1 SOLUTION RESPIRATORY (INHALATION)
Status: COMPLETED | OUTPATIENT
Start: 2022-12-29 | End: 2022-12-29

## 2022-12-29 RX ORDER — INSULIN LISPRO 100 [IU]/ML
0-4 INJECTION, SOLUTION INTRAVENOUS; SUBCUTANEOUS NIGHTLY
Status: DISCONTINUED | OUTPATIENT
Start: 2022-12-29 | End: 2023-01-06 | Stop reason: HOSPADM

## 2022-12-29 RX ORDER — SODIUM CHLORIDE 9 MG/ML
INJECTION, SOLUTION INTRAVENOUS PRN
Status: DISCONTINUED | OUTPATIENT
Start: 2022-12-29 | End: 2023-01-06 | Stop reason: HOSPADM

## 2022-12-29 RX ORDER — CARVEDILOL 25 MG/1
25 TABLET ORAL 2 TIMES DAILY WITH MEALS
Status: DISCONTINUED | OUTPATIENT
Start: 2022-12-29 | End: 2023-01-06 | Stop reason: HOSPADM

## 2022-12-29 RX ORDER — AMIODARONE HYDROCHLORIDE 200 MG/1
200 TABLET ORAL DAILY
Status: DISCONTINUED | OUTPATIENT
Start: 2022-12-29 | End: 2023-01-06 | Stop reason: HOSPADM

## 2022-12-29 RX ORDER — VALSARTAN 80 MG/1
80 TABLET ORAL DAILY
Status: DISCONTINUED | OUTPATIENT
Start: 2022-12-29 | End: 2023-01-06 | Stop reason: HOSPADM

## 2022-12-29 RX ORDER — ALBUTEROL SULFATE 2.5 MG/3ML
2.5 SOLUTION RESPIRATORY (INHALATION) 4 TIMES DAILY
Status: DISCONTINUED | OUTPATIENT
Start: 2022-12-29 | End: 2022-12-31

## 2022-12-29 RX ORDER — ROSUVASTATIN CALCIUM 10 MG/1
10 TABLET, COATED ORAL NIGHTLY
Status: DISCONTINUED | OUTPATIENT
Start: 2022-12-29 | End: 2023-01-06

## 2022-12-29 RX ORDER — FUROSEMIDE 10 MG/ML
40 INJECTION INTRAMUSCULAR; INTRAVENOUS ONCE
Status: COMPLETED | OUTPATIENT
Start: 2022-12-29 | End: 2022-12-29

## 2022-12-29 RX ORDER — DEXTROSE MONOHYDRATE 100 MG/ML
INJECTION, SOLUTION INTRAVENOUS CONTINUOUS PRN
Status: DISCONTINUED | OUTPATIENT
Start: 2022-12-29 | End: 2023-01-06 | Stop reason: HOSPADM

## 2022-12-29 RX ORDER — DEXAMETHASONE SODIUM PHOSPHATE 10 MG/ML
10 INJECTION INTRAMUSCULAR; INTRAVENOUS ONCE
Status: COMPLETED | OUTPATIENT
Start: 2022-12-29 | End: 2022-12-29

## 2022-12-29 RX ORDER — INSULIN LISPRO 100 [IU]/ML
0-8 INJECTION, SOLUTION INTRAVENOUS; SUBCUTANEOUS
Status: DISCONTINUED | OUTPATIENT
Start: 2022-12-29 | End: 2023-01-06 | Stop reason: HOSPADM

## 2022-12-29 RX ORDER — SODIUM CHLORIDE 0.9 % (FLUSH) 0.9 %
5-40 SYRINGE (ML) INJECTION PRN
Status: DISCONTINUED | OUTPATIENT
Start: 2022-12-29 | End: 2023-01-06 | Stop reason: HOSPADM

## 2022-12-29 RX ORDER — ONDANSETRON 2 MG/ML
4 INJECTION INTRAMUSCULAR; INTRAVENOUS EVERY 6 HOURS PRN
Status: DISCONTINUED | OUTPATIENT
Start: 2022-12-29 | End: 2023-01-06 | Stop reason: HOSPADM

## 2022-12-29 RX ORDER — OSELTAMIVIR PHOSPHATE 75 MG/1
75 CAPSULE ORAL 2 TIMES DAILY
Status: COMPLETED | OUTPATIENT
Start: 2022-12-29 | End: 2023-01-02

## 2022-12-29 RX ORDER — SODIUM CHLORIDE 0.9 % (FLUSH) 0.9 %
5-40 SYRINGE (ML) INJECTION EVERY 12 HOURS SCHEDULED
Status: DISCONTINUED | OUTPATIENT
Start: 2022-12-29 | End: 2023-01-06 | Stop reason: HOSPADM

## 2022-12-29 RX ORDER — CLOPIDOGREL BISULFATE 75 MG/1
75 TABLET ORAL DAILY
Status: DISCONTINUED | OUTPATIENT
Start: 2022-12-29 | End: 2023-01-06 | Stop reason: HOSPADM

## 2022-12-29 RX ORDER — LATANOPROST 50 UG/ML
1 SOLUTION/ DROPS OPHTHALMIC NIGHTLY
Status: DISCONTINUED | OUTPATIENT
Start: 2022-12-29 | End: 2023-01-06 | Stop reason: HOSPADM

## 2022-12-29 RX ORDER — METHYLPREDNISOLONE SODIUM SUCCINATE 125 MG/2ML
40 INJECTION, POWDER, LYOPHILIZED, FOR SOLUTION INTRAMUSCULAR; INTRAVENOUS EVERY 8 HOURS
Status: DISCONTINUED | OUTPATIENT
Start: 2022-12-29 | End: 2023-01-01

## 2022-12-29 RX ORDER — ALBUTEROL SULFATE 2.5 MG/3ML
2.5 SOLUTION RESPIRATORY (INHALATION) EVERY 6 HOURS PRN
Status: DISCONTINUED | OUTPATIENT
Start: 2022-12-29 | End: 2023-01-06 | Stop reason: HOSPADM

## 2022-12-29 RX ORDER — LANOLIN ALCOHOL/MO/W.PET/CERES
400 CREAM (GRAM) TOPICAL DAILY
Status: DISCONTINUED | OUTPATIENT
Start: 2022-12-29 | End: 2023-01-06 | Stop reason: HOSPADM

## 2022-12-29 RX ORDER — POLYETHYLENE GLYCOL 3350 17 G/17G
17 POWDER, FOR SOLUTION ORAL DAILY PRN
Status: DISCONTINUED | OUTPATIENT
Start: 2022-12-29 | End: 2023-01-06 | Stop reason: HOSPADM

## 2022-12-29 RX ADMIN — LATANOPROST 1 DROP: 50 SOLUTION OPHTHALMIC at 22:00

## 2022-12-29 RX ADMIN — ALBUTEROL SULFATE 2.5 MG: 2.5 SOLUTION RESPIRATORY (INHALATION) at 15:33

## 2022-12-29 RX ADMIN — OSELTAMIVIR PHOSPHATE 75 MG: 75 CAPSULE ORAL at 14:49

## 2022-12-29 RX ADMIN — DEXAMETHASONE SODIUM PHOSPHATE 10 MG: 10 INJECTION, SOLUTION INTRAMUSCULAR; INTRAVENOUS at 11:36

## 2022-12-29 RX ADMIN — ALBUTEROL SULFATE 2.5 MG: 2.5 SOLUTION RESPIRATORY (INHALATION) at 22:37

## 2022-12-29 RX ADMIN — ROSUVASTATIN 10 MG: 10 TABLET, FILM COATED ORAL at 22:00

## 2022-12-29 RX ADMIN — MONTELUKAST 10 MG: 10 TABLET, FILM COATED ORAL at 22:00

## 2022-12-29 RX ADMIN — SODIUM CHLORIDE, PRESERVATIVE FREE 10 ML: 5 INJECTION INTRAVENOUS at 22:01

## 2022-12-29 RX ADMIN — MOMETASONE FUROATE AND FORMOTEROL FUMARATE DIHYDRATE 2 PUFF: 200; 5 AEROSOL RESPIRATORY (INHALATION) at 22:37

## 2022-12-29 RX ADMIN — CEFTRIAXONE SODIUM 1000 MG: 1 INJECTION, POWDER, FOR SOLUTION INTRAMUSCULAR; INTRAVENOUS at 11:36

## 2022-12-29 RX ADMIN — OSELTAMIVIR PHOSPHATE 75 MG: 75 CAPSULE ORAL at 22:00

## 2022-12-29 RX ADMIN — IPRATROPIUM BROMIDE AND ALBUTEROL SULFATE 1 AMPULE: .5; 3 SOLUTION RESPIRATORY (INHALATION) at 10:41

## 2022-12-29 RX ADMIN — APIXABAN 5 MG: 5 TABLET, FILM COATED ORAL at 22:00

## 2022-12-29 RX ADMIN — TIOTROPIUM BROMIDE INHALATION SPRAY 2 PUFF: 3.12 SPRAY, METERED RESPIRATORY (INHALATION) at 15:34

## 2022-12-29 RX ADMIN — FUROSEMIDE 40 MG: 10 INJECTION, SOLUTION INTRAMUSCULAR; INTRAVENOUS at 12:15

## 2022-12-29 ASSESSMENT — ENCOUNTER SYMPTOMS
ABDOMINAL PAIN: 0
SPUTUM PRODUCTION: 1
VOMITING: 0
SHORTNESS OF BREATH: 1
SHORTNESS OF BREATH: 1
SORE THROAT: 0
COUGH: 1
COUGH: 1
SWOLLEN GLANDS: 0
HEMOPTYSIS: 0
WHEEZING: 1
SPUTUM PRODUCTION: 0

## 2022-12-29 ASSESSMENT — PAIN - FUNCTIONAL ASSESSMENT: PAIN_FUNCTIONAL_ASSESSMENT: NONE - DENIES PAIN

## 2022-12-29 NOTE — PATIENT INSTRUCTIONS
Still wheezing scattered rhonchi after neb tx. O2 sat up in 90's w/ O2. Lethargic. Malodorous, dark urine, question UTI.

## 2022-12-29 NOTE — ED TRIAGE NOTES
Pt arrives via pov in wheelchair with another nurse and spouse. Hypoxic in triage at 84% RA. Pt has worn 02 at home for covid but does not use now. Spouse reports possible recent dx of pneumonia. Reporting urine incontinence that is new. New confusion per spouse. Pt arrives joking in triage with even and unlabored respirations.

## 2022-12-29 NOTE — PROGRESS NOTES
PRESENT ILLNESS:    He is a 62-year-old male with history of severe COPD who was seen as a work in visit today secondary to worsening shortness of breath, lethargy, cough/chest congestion and noted to have O2 saturation in the mid 80s. Wife that she placed CPAP on him and oxygen saturation improved to 91% with interval improvement in his mentation. Was advised appropriately by triage, and to seek emergent medical care if he had worsening symptoms or difficulty maintaining saturation below 90% consistently. Patient contacted us 12/23/2022 requesting order for nebulizer as he had noticed good response to this when given in office previously. Note that steroids were prescribed recently by primary provider. He has yet to receive his nebulizer device. Today, he is accompanied by his wife who assists with history. Patient is somewhat lethargic during visit. O2 sat was 88% upon arrival, improved to 96% on 2 L/min. He presents in wheelchair. Questionable wheezing. Has had some cough with occasional green sputum. No fever, questionable chills. Wife reports that he has had urinary incontinence recently, urine has some odor and is brown, although reports that he has had this previously. Compliant with Advair, using albuterol inhaler intermittently. Compliant with CPAP with sleep. He is more communicative     DIAGNOSTICS:        Spirometry 5/2013: FEV1 1.70 Final FEV1 % Pred 46 % Final FVC 2.55 Final FVC % Pred 52 % Final FEV1/F; Significant  interval decline in FVC and FEV (previously 67% and 58% of predicted, or 3.25 and 2.16 L respectively). Chest CT 10/2014 - Negative for pulmonary embolus; basilar atelectasis. Stable left adrenal mass. Spirometry:  10/12/2015 - Moderate restrictive defect, significant interval improvement in FVC  and FEV1. Spirometry 4/11/0216 - moderate restrictive and obstructive defects, no significant interval change. CXR 10/2016-linear density in both lung bases. Could reflect some atelectasis and/or scarring  Spirometry 11/17/2016 - moderate restrictive defect. Spirometry 2/14/2018-moderate obstructive and restrictive defects, no significant interval change. CXR 2/8/2019-suboptimal exposure Technique. Atelectasis or consolidation left lower lobe. Spirometry 2/20/2019 interval decline in FVC, no significant change in FEV1  CXR 4/9/2019-clearing of left lower lobe infiltrate, no acute process. Spirometry 10/7/2019-moderately severe restrictive defect, no significant change. Ambulatory oximetry 3/1/2021-baseline saturation 96% room air at rest, 86 room air with ambulation. 92% on 2 lpm w/ exertion. CXR 5/11/2021-overall improvement in bilateral airspace opacities, mild bibasilar atelectasis/infiltrates present. CXR 9/1/2021-clear lungs, no acute cardiopulmonary process. Spirometry 3/1/2022-severe obstructive defect with decreased FVC, interval improvement in FVC. Chest CT 3/9/2022-multilobar atypical infectious or inflammatory bronchiolitis primarily affecting the small airways involving the bilateral upper lobes and left lower lobe. PCXR 7/27/2022-pulmonary infiltrate in RUL and mid to lower left lung Has mostly cleared. Atelectasis or scarring right lung base appears stable. Stable cardiomegaly. ECHO 7/30/2022-EF 40-45%. Normal diastolic function. RVSP estimated 27 mmHg. Mild to moderate TR, mild MR. CXR 8/12/2022-improved bibasilar opacities with mild residual remaining on the right. CXR 8/30/2022-early pulmonary edema favored. CXR 9/1/2022-stable cardiomegaly. No airspace consolidation. Mild prominence of interstitial markings which is unchange    _____________________________________________________________      REVIEW OF SYSTEMS:    Review of Systems   Constitutional: Positive for chills. Negative for fever. Respiratory:  Positive for cough, shortness of breath and sputum production. Wife reports questionable wheezing.    Musculoskeletal: Wife reports that he had recent fall, did not seek medical attention. No evident injuries. Psychiatric/Behavioral:  Positive for altered mental status. PHYSICAL EXAM:    Vitals:    12/29/22 0843   BP: (!) 146/74   Pulse: 62   Temp: 97.1 °F (36.2 °C)   TempSrc: Skin   SpO2: (!) 88%   Weight: 225 lb (102.1 kg)   Height: 6' (1.829 m)        Body mass index is 30.52 kg/m². GENERAL APPEARANCE:  The patient is obese and in no respiratory distress. Examined in wheelchair. HEENT:  PERRL. Conjunctivae unremarkable. NECK/LYMPHATIC:   Symmetrical with no elevation of jugular venous pulsation. Trachea midline. No thyroid enlargement. No cervical adenopathy. LUNGS:   Normal respiratory effort with symmetrical lung expansion. Breath sounds-scattered wheezing, rhonchi. Darrick Salm HEART:   There is a normal rate and regular rhythm. No murmur, rub, or gallop. There is no edema in the lower extremities. ABDOMEN:  Soft and non-tender. No hepatosplenomegaly. Bowel sounds are normal.      NEURO:  The patient is drowsy. No gross sensorimotor deficits are present. DIAGNOSTIC TESTS: Studies were personally reviewed by me and discussed with the patient. None today    CXR:      XR CHEST (2 VW) 09/02/2022    Narrative  PA AND LATERAL CHEST X-RAY. Clinical Indication: Increasing oxygen requirement    Comparison: Chest x-ray dated 8/30/2022    Findings: 2 views of the chest submitted demonstrate stable cardiomegaly with an  AICD in place. No airspace consolidation evident. There is mild prominence of  interstitial markings which is unchanged. There is no pleural effusion. There is  no pneumothorax. Impression  No significant interval change. CT WITH CONTRAST:    CT CHEST W CONTRAST 03/09/2022    FINDINGS:    No mediastinal or axillary adenopathy. There is no pleural or pericardial  effusion. There is no pneumothorax.  Central patchy groundglass opacities noted  in the right upper lobe with a more small airways type groundglass nodular  appearance in the left lower lobe and lingula. Similar findings are noted to a  lesser extent in the left lower lobe. No dense airspace consolidation evident. There is no pleural effusion. Limited evaluation of the upper abdomen is unremarkable. Impression  1. Multilobar atypical infectious or inflammatory bronchiolitis primarily  affecting the small airways involving the bilateral upper lobes and left lower  lobe.         Past Medical History:   Diagnosis Date    Acute respiratory failure with hypoxia ([address] 10/23/2014    MINI (acute kidney injury) ([address] 09/15/2014    MINI (acute kidney injury) ([address]     MINI (acute kidney injury) ([address]     Allergic rhinitis 11/10/2015    Asthma 11/10/2015    Benign essential hypertension 11/10/2015    Benign neoplasm of colon 11/10/2015    CAD (coronary artery disease) 11/10/2015    CAD (coronary artery disease) 1998, 1999    mix2, 3 stents [id number]    CAP (community acquired pneumonia) 12/31/2020    Cardiomyopathy ([address] 92/83/7897    Complicated wound infection 11/10/2015    COPD (chronic obstructive pulmonary disease) with emphysema ([address] 11/10/2015    COPD exacerbation ([address] 10/12/2011    COVID-19 12/31/2020    Diabetes mellitus type 2, controlled ([address] 11/10/2015    doesn/t check daily oral meds, A1c 6.0 (8/10/22)    Diabetic neuropathy ([address] 11/10/2015    Disruption of wound, unspecified 10/09/2014    Encounter for long-term (current) use of other high-risk medications 11/10/2015    Extremity atherosclerosis with intermittent claudication ([address] 11/10/2015    H/O endarterectomy 11/10/2015    Hiatal hernia 11/10/2015    History of 2019 novel coronavirus disease (COVID-19)     12/31/2020- hospitalized    Hyperglycemia due to type 1 diabetes mellitus ([address] 11/10/2015    Hyperlipidemia 11/10/2015    ICD (implantable cardioverter-defibrillator) in place 06/16/2022    Monomorphic ventricular tachycardia 12/31/2020    Myocardial Hypoxia    Abnormal nuclear cardiac imaging test    PVC's (premature ventricular contractions)    Asbestos exposure    Sepsis ([address]    ICD (implantable cardioverter-defibrillator) in place    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, vertebra(e), init (HCC)    General weakness    Acute cystitis without hematuria    Low back pain without sciatica    Back pain    DNI (do not intubate)    Elevated liver enzymes    Anemia    Hyponatremia    Physical debility    Lumbar discitis    Psoas muscle abscess (HCC)    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 LM:nl. LAD:30-40% mid stenosis. LCX OM1:95% stenosis. RCA:100% occlusion at RV branch.     CATARACT REMOVAL Right 2022    CORONARY ANGIOPLASTY WITH STENT PLACEMENT  2018    LCX OM1:2.5 x 12 Giuseppe RAKESH    CORONARY ANGIOPLASTY WITH STENT PLACEMENT      MS ABDOMEN SURGERY PROC UNLISTED      abdominal cyst removed    MS CARDIAC SURG PROCEDURE UNLIST      stents x3    SPINE SURGERY N/A 2022    LUMBAR 2, LUMBAR 4 KYPHOPLASTY AND ANY OTHER INDICATED LEVELS performed by Bobby Sullivan MD at [facility] MAIN OR    VASCULAR SURGERY  14    Repair of right common femoral artery     VASCULAR SURGERY  14    tiffanie femoral endarterectomy    VASCULAR SURGERY Left 10/28/2022    LEFT LOWER EXTREMITY ARTERIOGRAM / ULTRASOUND GUIDED ACCESS   performed by Henok Bergeron MD at [facility] MAIN OR         Social History     Socioeconomic History    Marital status:      Spouse name: None    Number of children: None    Years of education: None    Highest education level: None   Tobacco Use    Smoking status: Former     Packs/day: 1.00     Years: 50.00     Pack years: 50.00     Types: Cigarettes     Quit date: 10/2/2011     Years since quittin.2    Smokeless tobacco: Current     Types: Chew    Tobacco comments:     Chews tobacco daily   Vaping Use    Vaping Use: Never used   Substance and Sexual Activity    Alcohol use: Yes     Alcohol/week: 0.0 standard drinks    Drug use: No   Social History Narrative    Lives with wife     Social Determinants of Health     Financial Resource Strain: Unknown    Difficulty of Paying Living Expenses: Patient refused   Food Insecurity: Unknown    Worried About Running Out of Food in the Last Year: Patient refused    Ran Out of Food in the Last Year: Patient refused       Family History   Problem Relation Age of Onset    Breast Cancer Mother     Hypertension Mother     Other Father         DIVERTICULITIS    Crohn's Disease Sister     Lung Disease Brother         mesothioloma    Diabetes Sister     Heart Attack Father     Heart Disease Father     Stroke Mother        Allergies   Allergen Reactions    Acetaminophen Hives     Per spouse has small amount of hives, takes 1/2 and tolerates     Azithromycin Hives    Morphine Hallucinations     Muscle twitching. jerking    Oxaprozin Other (See Comments)     ELEVATED BLOOD PRESSURE    Oxycodone Anxiety       Current Outpatient Medications   Medication Sig    albuterol (PROVENTIL) (2.5 MG/3ML) 0.083% nebulizer solution 1 vial via nebulizer 4 times daily if needed for shortness of breath or wheezing. Diagnosis-COPD, J44.9. Bill to Medicare part B    carvedilol (COREG) 25 MG tablet Take 1 tablet by mouth 2 times daily (with meals)    senna-docusate (PERICOLACE) 8.6-50 MG per tablet Take 1 tablet by mouth nightly    valsartan (DIOVAN) 80 MG tablet Take 1 tablet by mouth daily    CPAP Machine MISC by Does not apply route    albuterol sulfate HFA (PROVENTIL;VENTOLIN;PROAIR) 108 (90 Base) MCG/ACT inhaler USE 2 PUFFS BY INHALATION 4 TIMES DAILY AS NEEDED FOR WHEEZING OR SHORTNESS OF BREATH. Patient prefers ventolin.     GUAIFENESIN 1200 PO Take 1 tablet by mouth every 12 hours as needed    tamsulosin (FLOMAX) 0.4 MG capsule Take 0.4 mg by mouth daily    traMADol (ULTRAM) 50 MG tablet Take 50 mg by mouth every 6 hours as needed for Pain. Taking 1/2 every morning    acetaminophen (TYLENOL) 500 MG tablet Take 500 mg by mouth every 6 hours as needed for Pain Taking 1/2 two times daily    glipiZIDE (GLUCOTROL XL) 10 MG extended release tablet Take 1 tablet by mouth daily    furosemide (LASIX) 20 MG tablet Take 1 tablet by mouth in the morning. (Patient taking differently: Take 20 mg by mouth daily 1/2 tablet)    rosuvastatin (CRESTOR) 10 MG tablet TAKE 1 TABLET BY MOUTH EVERY DAY (Patient taking differently: at bedtime)    fluticasone-salmeterol (ADVAIR) 250-50 MCG/ACT AEPB diskus inhaler Inhale 1 puff into the lungs in the morning and at bedtime    amiodarone (CORDARONE) 200 MG tablet Take 200 mg by mouth daily    apixaban (ELIQUIS) 5 MG TABS tablet Take 5 mg by mouth every 12 hours    ascorbic acid (VITAMIN C) 500 MG tablet Take 500 mg by mouth    Cholecalciferol 50 MCG (2000 UT) TABS Take 2,000 Units by mouth    clopidogrel (PLAVIX) 75 MG tablet Take 75 mg by mouth daily    fluticasone (FLONASE) 50 MCG/ACT nasal spray USE 1 OR 2 SPRAYS IN EACH NOSTRIL EVERY DAY. latanoprost (XALATAN) 0.005 % ophthalmic solution Apply 1 drop to eye nightly    magnesium oxide (MAG-OX) 400 (240 Mg) MG tablet TAKE 1 TABLET BY MOUTH EVERY DAY    montelukast (SINGULAIR) 10 MG tablet TAKE 1 TABLET BY MOUTH EVERY DAY AT BEDTIME    nitroGLYCERIN (NITROSTAT) 0.4 MG SL tablet Place 0.4 mg under the tongue    predniSONE 10 MG (21) TBPK Take as directed (Patient not taking: Reported on 12/29/2022)    atropine 1 % ophthalmic solution  (Patient not taking: Reported on 12/29/2022)    polyethylene glycol (GLYCOLAX) 17 GM/SCOOP powder Take 17 g by mouth daily (Patient not taking: Reported on 12/29/2022)     No current facility-administered medications for this visit.        Over 50% of today's office visit was spent in face to face time reviewing test results, prognosis, importance of compliance, education about disease process, benefits of medications, instructions for management of acute symptoms, and follow up plans. Electronically signed. Dictated using voice recognition software.   Proof read but unrecognized errors may exist.

## 2022-12-29 NOTE — ED NOTES
TRANSFER - OUT REPORT:    Verbal report given to Martyn Schwab, RN on Aleena Rosenthal  being transferred to [facility]  for routine progression of patient care       Report consisted of patient's Situation, Background, Assessment and   Recommendations(SBAR). Information from the following report(s) ED SBAR, Intake/Output, MAR, and Recent Results was reviewed with the receiving nurse. Carlton Assessment: Presents to emergency department  because of falls (Syncope, seizure, or loss of consciousness): No, Age > 79: Yes, Altered Mental Status, Intoxication with alcohol or substance confusion (Disorientation, impaired judgment, poor safety awaremess, or inability to follow instructions): Yes, Impaired Mobility: Ambulates or transfers with assistive devices or assistance; Unable to ambulate or transer.: Yes  Lines:   Peripheral IV 12/29/22 Left;Dorsal Forearm (Active)        Opportunity for questions and clarification was provided.       Patient transported with:  O2 @ 3lpm and [facility]  12/29/22 2291

## 2022-12-29 NOTE — ED PROVIDER NOTES
Emergency Department Provider Note                   PCP:                Antonio Correa MD               Age: 65 y.o. Sex: male       ICD-10-CM    1. COPD exacerbation ([facility]  J44.1       2. Hypoxia  R09.02       3. Congestive heart failure, unspecified HF chronicity, unspecified heart failure type ([facility]  I50.9           DISPOSITION Decision To Admit 12/29/2022 11:46:45 AM        MDM  Number of Diagnoses or Management Options  Congestive heart failure, unspecified HF chronicity, unspecified heart failure type ([facility]  COPD exacerbation (HCC)  Hypoxia  Diagnosis management comments: Dx: COPD exacerbation; Flu+, CHF    Patient is a 65-year-old male with a history of COPD who presented with shortness of breath to the pulmonary clinic this morning satting 88% on room air and wheezing. Patient was placed on oxygen and received a breathing treatment at the facility and O2 sat increased to 96%. Patient has had a increased cough. Patient had a temperature 99.7 here with O2 sat of 95% on 2 L nasal cannula here. Patient chest x-ray shows cardiomegaly and possible CHF. Patient's BNP is greater than 10,000 and troponin slightly elevated. Patient has no chest pain. Patient is FLU positive, COVID neg. Patient has received albuterol treatments here, Decadron, Lasix 40 mg IV. Patient is a 65-year-old male with a history of COPD, DMI, paroxysmal A. fib, V. tach, ICD, still chews tobacco, no tobacco use for the last 15 years who presents with shortness of breath and wheezing for the last 4 to 5 days. Patient has had intermittent confusion. Patient was seen at the pulmonary clinic this morning and had O2 sat of 88% on room air. Patient was placed on 2 L nasal cannula and improved to 96% on 2 L nasal cannula and had improvement of his mental status. Pulmonary service cleared the patient over indicated that the patient had a nebulizer machine ordered but has not received it.   Patient had [place] 2 years ago and at that time was discharged home with oxygen dependent. Patient stopped using that oxygen and has not used it since. Amount and/or Complexity of Data Reviewed  Clinical lab tests: ordered and reviewed  Tests in the radiology section of CPT®: ordered and reviewed  Review and summarize past medical records: yes  Independent visualization of images, tracings, or specimens: yes    Risk of Complications, Morbidity, and/or Mortality  Presenting problems: high  Diagnostic procedures: high  Management options: high  General comments: Patient hypoxic requiring oxygen supplementation. Critical Care  Total time providing critical care: 30-74 minutes    Patient Progress  Patient progress: stable     Complexity of Problem: 1 acute uncomplicated illness requiring admission. (3)  The patients assessment required an independent historian: I spoke with a family member. I have conducted an independent ordering and review of Labs. I have conducted an independent ordering and review of EKG. I have conducted an independent ordering and review of X-rays. I have reviewed records from an external source: provider visit notes from PCP. Considerations: Shared decision making was utilized in the care of this patient. Patient was admitted I have communication with admitting physician.          Orders Placed This Encounter   Procedures    COVID-19, Rapid    Rapid influenza A/B antigens    Blood Culture 1    Blood Culture 2    XR CHEST PORTABLE    CBC with Auto Differential    CMP    Lactate, Sepsis    Brain Natriuretic Peptide    Troponin    POCT Urinalysis no Micro    EKG 12 Lead        Medications   cefTRIAXone (ROCEPHIN) 1,000 mg in sodium chloride 0.9 % 50 mL IVPB mini-bag (1,000 mg IntraVENous New Bag 12/29/22 1136)   furosemide (LASIX) injection 40 mg (has no administration in time range)   ipratropium-albuterol (DUONEB) nebulizer solution 1 ampule (1 ampule Inhalation Given 12/29/22 1041)   dexamethasone (DECADRON) injection 10 mg (10 mg IntraVENous Given 12/29/22 1136)       New Prescriptions    No medications on file        Janine Dejesus is a 65 y.o. male who presents to the Emergency Department with chief complaint of    Chief Complaint   Patient presents with    Shortness of Breath      Patient is a 19-year-old male with a history of COPD, DMI, paroxysmal A. fib, V. tach, ICD, still chews tobacco, no tobacco use for the last 15 years who presents with shortness of breath and wheezing for the last 4 to 5 days. Patient has had intermittent confusion. Patient was seen at the pulmonary clinic this morning and had O2 sat of 88% on room air. Patient was placed on 2 L nasal cannula and improved to 96% on 2 L nasal cannula and had improvement of his mental status. Pulmonary service cleared the patient over indicated that the patient had a nebulizer machine ordered but has not received it. Patient had [place] 2 years ago and at that time was discharged home with oxygen dependent. Patient stopped using that oxygen and has not used it since. The history is provided by the patient. Shortness of Breath  Severity:  Mild  Onset quality:  Gradual  Duration:  4 days  Timing:  Intermittent  Progression:  Waxing and waning  Chronicity:  Recurrent  Context: activity    Relieved by:  Rest and oxygen  Worsened by:  Exertion  Ineffective treatments:  Inhaler  Associated symptoms: cough and wheezing    Associated symptoms: no abdominal pain, no diaphoresis, no fever, no headaches, no hemoptysis, no PND, no sore throat, no sputum production, no syncope, no swollen glands and no vomiting    Risk factors: no recent alcohol use        Review of Systems   Constitutional:  Negative for diaphoresis and fever. HENT:  Negative for sore throat. Respiratory:  Positive for cough, shortness of breath and wheezing. Negative for hemoptysis and sputum production. Cardiovascular:  Negative for syncope and PND.    Gastrointestinal:  Negative for abdominal pain and vomiting. Neurological:  Negative for headaches. All other systems reviewed and are negative.     Past Medical History:   Diagnosis Date    Acute respiratory failure with hypoxia ([address] 10/23/2014    MINI (acute kidney injury) ([address] 09/15/2014    MINI (acute kidney injury) ([address]     MINI (acute kidney injury) ([address]     Allergic rhinitis 11/10/2015    Asthma 11/10/2015    Benign essential hypertension 11/10/2015    Benign neoplasm of colon 11/10/2015    CAD (coronary artery disease) 11/10/2015    CAD (coronary artery disease) 1998, 1999    mix2, 3 stents [id number]    CAP (community acquired pneumonia) 12/31/2020    Cardiomyopathy ([address] 56/02/6729    Complicated wound infection 11/10/2015    COPD (chronic obstructive pulmonary disease) with emphysema ([address] 11/10/2015    COPD exacerbation ([address] 10/12/2011    COVID-19 12/31/2020    Diabetes mellitus type 2, controlled ([address] 11/10/2015    doesn/t check daily oral meds, A1c 6.0 (8/10/22)    Diabetic neuropathy ([address] 11/10/2015    Disruption of wound, unspecified 10/09/2014    Encounter for long-term (current) use of other high-risk medications 11/10/2015    Extremity atherosclerosis with intermittent claudication ([address] 11/10/2015    H/O endarterectomy 11/10/2015    Hiatal hernia 11/10/2015    History of 2019 novel coronavirus disease (COVID-19)     12/31/2020- hospitalized    Hyperglycemia due to type 1 diabetes mellitus ([address] 11/10/2015    Hyperlipidemia 11/10/2015    ICD (implantable cardioverter-defibrillator) in place 06/16/2022    Monomorphic ventricular tachycardia 12/31/2020    Myocardial infarction [facility]) 1999    Myocardial infarction ([address] 11/10/2015    Obesity 11/10/2015    Orthostatic hypotension 11/10/2015    Osteoarthritis 11/10/2015    Peripheral vascular disease ([address] 11/10/2015    PNA (pneumonia) 02/08/2019    PVC (premature ventricular contraction)     Rash 11/78/2421    Seroma complicating a procedure 10/02/2014    Sleep apnea     cpap Toe laceration     Type 2 diabetes with nephropathy ([facility]     Uncontrolled type 2 diabetes mellitus 11/10/2015    Vertiginous syndromes and other disorders of vestibular system 11/10/2015        Past Surgical History:   Procedure Laterality Date    CARDIAC CATHETERIZATION  2018    LV EF=40%. LM:nl. LAD:30-40% mid stenosis. LCX OM1:95% stenosis. RCA:100% occlusion at RV branch. CATARACT REMOVAL Right 2022    CORONARY ANGIOPLASTY WITH STENT PLACEMENT  2018    LCX OM1:2.5 x 12 [place] RAKESH    CORONARY ANGIOPLASTY WITH STENT PLACEMENT      CO ABDOMEN SURGERY PROC UNLISTED      abdominal cyst removed    CO CARDIAC SURG PROCEDURE UNLIST      stents x3    SPINE SURGERY N/A 2022    LUMBAR 2, LUMBAR 4 KYPHOPLASTY AND ANY OTHER INDICATED LEVELS performed by Mati Dubon III, MD at [facility] MAIN OR    VASCULAR SURGERY  14    Repair of right common femoral artery     VASCULAR SURGERY  14    tiffanie femoral endarterectomy    VASCULAR SURGERY Left 10/28/2022    LEFT LOWER EXTREMITY ARTERIOGRAM / ULTRASOUND GUIDED ACCESS   performed by Stanislav Ramos MD at [facility] MAIN OR        Family History   Problem Relation Age of Onset    Breast Cancer Mother     Hypertension Mother     Other Father         DIVERTICULITIS    Crohn's Disease Sister     Lung Disease Brother         mesothioloma    Diabetes Sister     Heart Attack Father     Heart Disease Father     Stroke Mother         Social History     Socioeconomic History    Marital status:    Tobacco Use    Smoking status: Former     Packs/day: 1.00     Years: 50.00     Pack years: 50.00     Types: Cigarettes     Quit date: 10/2/2011     Years since quittin.2    Smokeless tobacco: Current     Types: Chew    Tobacco comments:     Chews tobacco daily   Vaping Use    Vaping Use: Never used   Substance and Sexual Activity    Alcohol use:  Yes     Alcohol/week: 0.0 standard drinks    Drug use: No   Social History Narrative    Lives with wife     Social Determinants of Health     Financial Resource Strain: Unknown    Difficulty of Paying Living Expenses: Patient refused   Food Insecurity: Unknown    Worried About Running Out of Food in the Last Year: Patient refused    Ran Out of Food in the Last Year: Patient refused         Acetaminophen, Azithromycin, Morphine, Oxaprozin, and Oxycodone     Previous Medications    ACETAMINOPHEN (TYLENOL) 500 MG TABLET    Take 500 mg by mouth every 6 hours as needed for Pain Taking 1/2 two times daily    ALBUTEROL (PROVENTIL) (2.5 MG/3ML) 0.083% NEBULIZER SOLUTION    1 vial via nebulizer 4 times daily if needed for shortness of breath or wheezing. Diagnosis-COPD, J44.9. Bill to Medicare part B    ALBUTEROL SULFATE HFA (PROVENTIL;VENTOLIN;PROAIR) 108 (90 BASE) MCG/ACT INHALER    USE 2 PUFFS BY INHALATION 4 TIMES DAILY AS NEEDED FOR WHEEZING OR SHORTNESS OF BREATH. Patient prefers ventolin. AMIODARONE (CORDARONE) 200 MG TABLET    Take 200 mg by mouth daily    APIXABAN (ELIQUIS) 5 MG TABS TABLET    Take 5 mg by mouth every 12 hours    ASCORBIC ACID (VITAMIN C) 500 MG TABLET    Take 500 mg by mouth    ATROPINE 1 % OPHTHALMIC SOLUTION        CARVEDILOL (COREG) 25 MG TABLET    Take 1 tablet by mouth 2 times daily (with meals)    CHOLECALCIFEROL 50 MCG (2000 UT) TABS    Take 2,000 Units by mouth    CLOPIDOGREL (PLAVIX) 75 MG TABLET    Take 75 mg by mouth daily    CPAP MACHINE MISC    by Does not apply route    FLUTICASONE (FLONASE) 50 MCG/ACT NASAL SPRAY    USE 1 OR 2 SPRAYS IN EACH NOSTRIL EVERY DAY. FLUTICASONE-SALMETEROL (ADVAIR) 250-50 MCG/ACT AEPB DISKUS INHALER    Inhale 1 puff into the lungs in the morning and at bedtime    FUROSEMIDE (LASIX) 20 MG TABLET    Take 1 tablet by mouth in the morning.     GLIPIZIDE (GLUCOTROL XL) 10 MG EXTENDED RELEASE TABLET    Take 1 tablet by mouth daily    GUAIFENESIN 1200 PO    Take 1 tablet by mouth every 12 hours as needed    LATANOPROST (XALATAN) 0.005 % OPHTHALMIC SOLUTION    Apply 1 drop to eye nightly    MAGNESIUM OXIDE (MAG-OX) 400 (240 MG) MG TABLET    TAKE 1 TABLET BY MOUTH EVERY DAY    MONTELUKAST (SINGULAIR) 10 MG TABLET    TAKE 1 TABLET BY MOUTH EVERY DAY AT BEDTIME    NITROGLYCERIN (NITROSTAT) 0.4 MG SL TABLET    Place 0.4 mg under the tongue    POLYETHYLENE GLYCOL (GLYCOLAX) 17 GM/SCOOP POWDER    Take 17 g by mouth daily    PREDNISONE 10 MG (21) TBPK    Take as directed    ROSUVASTATIN (CRESTOR) 10 MG TABLET    TAKE 1 TABLET BY MOUTH EVERY DAY    SENNA-DOCUSATE (PERICOLACE) 8.6-50 MG PER TABLET    Take 1 tablet by mouth nightly    TAMSULOSIN (FLOMAX) 0.4 MG CAPSULE    Take 0.4 mg by mouth daily    TRAMADOL (ULTRAM) 50 MG TABLET    Take 50 mg by mouth every 6 hours as needed for Pain. Taking 1/2 every morning    VALSARTAN (DIOVAN) 80 MG TABLET    Take 1 tablet by mouth daily        Vitals signs and nursing note reviewed. Patient Vitals for the past 4 hrs:   Temp Pulse Resp BP SpO2   12/29/22 1041 -- -- -- -- 95 %   12/29/22 1000 -- -- -- -- 92 %   12/29/22 0959 99.7 °F (37.6 °C) 62 18 (!) 123/58 (!) 84 %          Physical Exam  Vitals and nursing note reviewed. Constitutional:       Appearance: Normal appearance. HENT:      Head: Normocephalic and atraumatic. Nose: Nose normal.      Mouth/Throat:      Mouth: Mucous membranes are moist.   Eyes:      Extraocular Movements: Extraocular movements intact. Conjunctiva/sclera: Conjunctivae normal.      Pupils: Pupils are equal, round, and reactive to light. Cardiovascular:      Rate and Rhythm: Normal rate. Pulses: Normal pulses. Heart sounds: Normal heart sounds. Pulmonary:      Effort: Pulmonary effort is normal.      Breath sounds: Normal breath sounds. Abdominal:      General: Abdomen is flat. Musculoskeletal:         General: Normal range of motion. Cervical back: Normal range of motion and neck supple. Skin:     General: Skin is warm.       Capillary Refill: Capillary refill takes less than 2 seconds. Neurological:      General: No focal deficit present. Mental Status: He is alert and oriented to person, place, and time. Psychiatric:         Mood and Affect: Mood normal.         Behavior: Behavior normal.         Thought Content: Thought content normal.         Judgment: Judgment normal.        Procedures    Results for orders placed or performed during the hospital encounter of 12/29/22   COVID-19, Rapid    Specimen: Nasopharyngeal   Result Value Ref Range    Source NASAL      SARS-CoV-2, Rapid Not detected NOTD     Rapid influenza A/B antigens    Specimen: Nasal Washing   Result Value Ref Range    Influenza A Ag Positive (A) NEG      Influenza B Ag Negative NEG      Source Nasopharyngeal     XR CHEST PORTABLE    Narrative    CHEST X-RAY, single portable view  12/29/2022    History: Hypoxic. Recent pneumonia. Technique: Single frontal view of the chest.    Comparison: Chest x-ray 9/2/2022    Findings:   A grossly stable left-sided intracardiac device is seen. The cardiac silhouette  is mildly enlarged although decreased in size from the prior comparison study. The lungs are expanded without evidence for pneumothorax. No consolidative  airspace process or pleural effusion is seen. Only stable basilar reticular  interstitial prominence is seen most consistent with scarring or potential  atelectasis. Impression    1. Only mild cardiomegaly seen which does appear improved from the prior study. Some component of early or mild heart failure is difficult to exclude given the  patient's acute symptoms, however. No potential acute process is otherwise  suggested.    CBC with Auto Differential   Result Value Ref Range    WBC 7.4 4.3 - 11.1 K/uL    RBC 3.23 (L) 4.23 - 5.6 M/uL    Hemoglobin 10.0 (L) 13.6 - 17.2 g/dL    Hematocrit 29.5 (L) 41.1 - 50.3 %    MCV 91.3 82 - 102 FL    MCH 31.0 26.1 - 32.9 PG    MCHC 33.9 31.4 - 35.0 g/dL    RDW 16.3 (H) 11.9 - 14.6 %    Platelets 214 150 - 450 K/uL    MPV 10.1 9.4 - 12.3 FL    nRBC 0.00 0.0 - 0.2 K/uL    Differential Type AUTOMATED      Seg Neutrophils 79 (H) 43 - 78 %    Lymphocytes 9 (L) 13 - 44 %    Monocytes 11 4.0 - 12.0 %    Eosinophils % 0 (L) 0.5 - 7.8 %    Basophils 0 0.0 - 2.0 %    Immature Granulocytes 1 0.0 - 5.0 %    Segs Absolute 5.9 1.7 - 8.2 K/UL    Absolute Lymph # 0.6 0.5 - 4.6 K/UL    Absolute Mono # 0.8 0.1 - 1.3 K/UL    Absolute Eos # 0.0 0.0 - 0.8 K/UL    Basophils Absolute 0.0 0.0 - 0.2 K/UL    Absolute Immature Granulocyte 0.1 0.0 - 0.5 K/UL   CMP   Result Value Ref Range    Sodium 138 133 - 143 mmol/L    Potassium 3.1 (L) 3.5 - 5.1 mmol/L    Chloride 101 101 - 110 mmol/L    CO2 29 21 - 32 mmol/L    Anion Gap 8 2 - 11 mmol/L    Glucose 88 65 - 100 mg/dL    BUN 13 8 - 23 MG/DL    Creatinine 0.80 0.8 - 1.5 MG/DL    Est, Glom Filt Rate >60 >60 ml/min/1.73m2    Calcium 8.7 8.3 - 10.4 MG/DL    Total Bilirubin 0.7 0.2 - 1.1 MG/DL    ALT 46 12 - 65 U/L    AST 25 15 - 37 U/L    Alk Phosphatase 69 50 - 136 U/L    Total Protein 6.6 6.3 - 8.2 g/dL    Albumin 2.9 (L) 3.2 - 4.6 g/dL    Globulin 3.7 2.8 - 4.5 g/dL    Albumin/Globulin Ratio 0.8 0.4 - 1.6     Lactate, Sepsis   Result Value Ref Range    Lactic Acid, Sepsis 1.1 0.4 - 2.0 MMOL/L   Brain Natriuretic Peptide   Result Value Ref Range    NT Pro-BNP 10,883 (H) <450 PG/ML   Troponin   Result Value Ref Range    Troponin, High Sensitivity 51.7 (H) 0 - 14 pg/mL   EKG 12 Lead   Result Value Ref Range    Ventricular Rate 61 BPM    Atrial Rate 61 BPM    P-R Interval 210 ms    QRS Duration 100 ms    Q-T Interval 488 ms    QTc Calculation (Bazett) 491 ms    P Axis 67 degrees    R Axis 90 degrees    T Axis 95 degrees    Diagnosis Atrial-paced rhythm with prolonged AV conduction         XR CHEST PORTABLE   Final Result   1. Only mild cardiomegaly seen which does appear improved from the prior study.    Some component of early or mild heart failure is difficult to exclude given the patient's acute symptoms, however. No potential acute process is otherwise   suggested. Voice dictation software was used during the making of this note. This software is not perfect and grammatical and other typographical errors may be present. This note has not been completely proofread for errors.       Misael Patel MD  12/29/22 1200

## 2022-12-29 NOTE — H&P
Admit date: 2022   Name:  Jean-Pierre Newman   Age:  65 y.o.   :  1945   MRN:  [id number]   PCP:  Chico Martinez MD   Provider:  Kassandra Goss MD      ASSESSMENT AND PLAN  74-year-old male with a past medical history of coronary artery disease status post stents, ischemic cardiomyopathy, heart failure with reduced ejection fraction status post ICD, SHERI on CPAP, PAD, COPD, atrial fibrillation on Eliquis, diabetes, hypertension, hyperlipidemia, that presents in the setting of worsening shortness of breath    1. Acute hypoxic respiratory failure secondary to COPD exacerbation likely in the setting of influenza infection  -Oxygen therapy to attain oxygen saturation more than 92%  -Start bronchodilators and systemic steroids  -Start Tamiflu  -Continue home inhalers  -Start Spiriva  -Continue montelukast  -Symptomatic management  -Currently no signs of bacterial infection  -Obtain procalcitonin  -Hold off on antibiotics    2. Acute metabolic encephalopathy  -Obtain CT of the brain  -Obtain ABG  -TSH, ammonia, B12 levels  -Monitor mental status  -Avoid sedatives  -Delirium precautions    3. Hypokalemia  -Replete potassium  -Monitor potassium levels    4. Heart failure with reduced ejection fraction  -Continue Lasix  -Monitor volume status  -Strict input and output  -Daily weights  -TTE 2022 EF of 40 to 45%  -No radiologic findings of pulmonary edema at this time  -Hold carvedilol and valsartan for now since currently normotensive    5. Atrial fibrillation  -Telemetry monitoring  -Continue amiodarone  -Continue Eliquis pending CT of the brain    6. Coronary artery disease  -Continue Plavix and Crestor    7. Diabetes mellitus  -Insulin sliding scale  -Blood sugar checks before meals and at bedtime  -Hypoglycemic protocol    8.   SHERI  -CPAP at bedtime    DVT prophylaxis patient on Eliquis    Full code    Patient aware of plan        CHIEF COMPLAINT:  Shortness of breath      HISTORY OF PRESENT ILLNESS:  69-year-old male with a past medical history of coronary artery disease status post stents, ischemic cardiomyopathy, heart failure with reduced ejection fraction status post ICD, SHERI on CPAP, PAD, COPD, atrial fibrillation on Eliquis, diabetes, hypertension, hyperlipidemia, that presents in the setting of worsening shortness of breath. The patient was in his usual state of health until a few days ago when he started presented with worsening shortness of breath associated with productive cough with whitish sputum. He symptoms did not improve so he was brought to the emergency department. Otherwise he denies any fever or chills, no chest pain, no abdominal pain, no nausea, no vomiting or diarrhea. The wife states that he has also been presenting with some confusion. In the emergency department the patient was found to be hypoxic requiring oxygen by nasal cannula. Influenza A test positive. He will be admitted to the medical floors for further management.       Past Medical History:   Diagnosis Date    Acute respiratory failure with hypoxia ([address] 10/23/2014    MINI (acute kidney injury) ([address] 09/15/2014    MINI (acute kidney injury) ([address]     MINI (acute kidney injury) ([address]     Allergic rhinitis 11/10/2015    Asthma 11/10/2015    Benign essential hypertension 11/10/2015    Benign neoplasm of colon 11/10/2015    CAD (coronary artery disease) 11/10/2015    CAD (coronary artery disease) 1998, 1999    mix2, 3 stents [id number]    CAP (community acquired pneumonia) 12/31/2020    Cardiomyopathy ([address] 87/46/9898    Complicated wound infection 11/10/2015    COPD (chronic obstructive pulmonary disease) with emphysema ([address] 11/10/2015    COPD exacerbation ([address] 10/12/2011    COVID-19 12/31/2020    Diabetes mellitus type 2, controlled ([address] 11/10/2015    doesn/t check daily oral meds, A1c 6.0 (8/10/22)    Diabetic neuropathy ([address] 11/10/2015    Disruption of wound, unspecified 10/09/2014    Encounter for long-term (current) use of other high-risk medications 11/10/2015    Extremity atherosclerosis with intermittent claudication ([address] 11/10/2015    H/O endarterectomy 11/10/2015    Hiatal hernia 11/10/2015    History of 2019 novel coronavirus disease (COVID-19)     12/31/2020- hospitalized    Hyperglycemia due to type 1 diabetes mellitus ([address] 11/10/2015    Hyperlipidemia 11/10/2015    ICD (implantable cardioverter-defibrillator) in place 06/16/2022    Monomorphic ventricular tachycardia 12/31/2020    Myocardial infarction [facility]) 1999    Myocardial infarction ([address] 11/10/2015    Obesity 11/10/2015    Orthostatic hypotension 11/10/2015    Osteoarthritis 11/10/2015    Peripheral vascular disease ([facility] 11/10/2015    PNA (pneumonia) 02/08/2019    PVC (premature ventricular contraction)     Rash 27/95/2797    Seroma complicating a procedure 10/02/2014    Sleep apnea     cpap    Toe laceration     Type 2 diabetes with nephropathy ([address]     Uncontrolled type 2 diabetes mellitus 11/10/2015    Vertiginous syndromes and other disorders of vestibular system 11/10/2015       Past Surgical History:   Procedure Laterality Date    CARDIAC CATHETERIZATION  12/05/2018    LV EF=40%. LM:nl. LAD:30-40% mid stenosis. LCX OM1:95% stenosis. RCA:100% occlusion at RV branch.     CATARACT REMOVAL Right 07/20/2022    CORONARY ANGIOPLASTY WITH STENT PLACEMENT  12/05/2018    LCX OM1:2.5 x 12 Giuseppe RAKESH    CORONARY ANGIOPLASTY WITH STENT PLACEMENT  1999    AR ABDOMEN SURGERY PROC UNLISTED  1960    abdominal cyst removed    AR CARDIAC SURG PROCEDURE UNLIST  1999    stents x3    SPINE SURGERY N/A 07/19/2022    LUMBAR 2, LUMBAR 4 KYPHOPLASTY AND ANY OTHER INDICATED LEVELS performed by Katherin Pang MD at [address] [facility]  11/5/14    Repair of right common femoral artery     VASCULAR SURGERY  9/11/14    tiffanie femoral endarterectomy    VASCULAR SURGERY Left 10/28/2022    LEFT LOWER EXTREMITY ARTERIOGRAM / ULTRASOUND GUIDED ACCESS   performed by Emiliano Hong MD at [address] [place] [address]:  Prior to Admission medications    Medication Sig Start Date End Date Taking? Authorizing Provider   albuterol (PROVENTIL) (2.5 MG/3ML) 0.083% nebulizer solution 1 vial via nebulizer 4 times daily if needed for shortness of breath or wheezing. Diagnosis-COPD, J44.9. Bill to Medicare part B 12/23/22   AARON Riddle CNP   predniSONE 10 MG (21) TBPK Take as directed  Patient not taking: Reported on 12/29/2022 12/22/22   Chico Martinez MD   carvedilol (COREG) 25 MG tablet Take 1 tablet by mouth 2 times daily (with meals) 12/20/22   Chico Martinez MD   atropine 1 % ophthalmic solution  11/15/22   Historical Provider, MD ontiveros (Americo Call) 8.6-50 MG per tablet Take 1 tablet by mouth nightly 8/25/22   Historical Provider, MD   valsartan (DIOVAN) 80 MG tablet Take 1 tablet by mouth daily 11/3/22   AARON Christensen - CNP   CPAP Machine MISC by Does not apply route    Historical Provider, MD   albuterol sulfate HFA (PROVENTIL;VENTOLIN;PROAIR) 108 (90 Base) MCG/ACT inhaler USE 2 PUFFS BY INHALATION 4 TIMES DAILY AS NEEDED FOR WHEEZING OR SHORTNESS OF BREATH. Patient prefers ventolin. 10/27/22   AARON Riddle CNP   GUAIFENESIN 1200 PO Take 1 tablet by mouth every 12 hours as needed    Historical Provider, MD   tamsulosin (FLOMAX) 0.4 MG capsule Take 0.4 mg by mouth daily    Historical Provider, MD   traMADol (ULTRAM) 50 MG tablet Take 50 mg by mouth every 6 hours as needed for Pain. Taking 1/2 every morning    Historical Provider, MD   acetaminophen (TYLENOL) 500 MG tablet Take 500 mg by mouth every 6 hours as needed for Pain Taking 1/2 two times daily    Historical Provider, MD   glipiZIDE (GLUCOTROL XL) 10 MG extended release tablet Take 1 tablet by mouth daily 9/16/22   Ernie Morgan MD   furosemide (LASIX) 20 MG tablet Take 1 tablet by mouth in the morning.   Patient taking differently: Take 20 mg by mouth daily 1/2 tablet 8/9/22   Addison Alexandra MD   amLODIPine (NORVASC) 5 MG tablet Take 1 tablet by mouth in the morning. 8/9/22 9/16/22  Addison Alexandra MD   QUEtiapine (SEROQUEL) 25 MG tablet Take 1 tablet by mouth in the morning.  8/9/22 9/16/22  Addison Alexandra MD   rosuvastatin (CRESTOR) 10 MG tablet TAKE 1 TABLET BY MOUTH EVERY DAY  Patient taking differently: at bedtime 7/29/22   Vilma Barnes MD   fluticasone-salmeterol (ADVAIR) 250-50 MCG/ACT AEPB diskus inhaler Inhale 1 puff into the lungs in the morning and at bedtime 6/30/22   Historical Provider, MD   amiodarone (CORDARONE) 200 MG tablet Take 200 mg by mouth daily 3/10/22   Ar Automatic Reconciliation   apixaban (ELIQUIS) 5 MG TABS tablet Take 5 mg by mouth every 12 hours 3/10/22   Ar Automatic Reconciliation   ascorbic acid (VITAMIN C) 500 MG tablet Take 500 mg by mouth    Ar Automatic Reconciliation   Cholecalciferol 50 MCG (2000 UT) TABS Take 2,000 Units by mouth    Ar Automatic Reconciliation   clopidogrel (PLAVIX) 75 MG tablet Take 75 mg by mouth daily 3/11/22   Ar Automatic Reconciliation   fluticasone (FLONASE) 50 MCG/ACT nasal spray USE 1 OR 2 SPRAYS IN EACH NOSTRIL EVERY DAY. 3/15/22   Ar Automatic Reconciliation   latanoprost (XALATAN) 0.005 % ophthalmic solution Apply 1 drop to eye nightly    Ar Automatic Reconciliation   magnesium oxide (MAG-OX) 400 (240 Mg) MG tablet TAKE 1 TABLET BY MOUTH EVERY DAY 1/14/22   Ar Automatic Reconciliation   montelukast (SINGULAIR) 10 MG tablet TAKE 1 TABLET BY MOUTH EVERY DAY AT BEDTIME 3/15/22   Ar Automatic Reconciliation   nitroGLYCERIN (NITROSTAT) 0.4 MG SL tablet Place 0.4 mg under the tongue 3/11/22   Ar Automatic Reconciliation   polyethylene glycol (GLYCOLAX) 17 GM/SCOOP powder Take 17 g by mouth daily  Patient not taking: Reported on 12/29/2022 9/21/21   Ar Automatic Reconciliation   metFORMIN (GLUCOPHAGE) 500 MG tablet TAKE TWO TABLETS BY MOUTH 2 TIMES A DAY 3/23/22 8/8/22  Manpreet Payton Reconciliation         REVIEW OF SYSTEMS:  14 ROS negative except from stated on HPI      Social History     Tobacco Use    Smoking status: Former     Packs/day: 1.00     Years: 50.00     Pack years: 50.00     Types: Cigarettes     Quit date: 10/2/2011     Years since quittin.2    Smokeless tobacco: Current     Types: Chew    Tobacco comments:     Chews tobacco daily   Vaping Use    Vaping Use: Never used   Substance Use Topics    Alcohol use:  Yes     Alcohol/week: 0.0 standard drinks    Drug use: No         Family History   Problem Relation Age of Onset    Breast Cancer Mother     Hypertension Mother     Other Father         DIVERTICULITIS    Crohn's Disease Sister     Lung Disease Brother         mesothioloma    Diabetes Sister     Heart Attack Father     Heart Disease Father     Stroke Mother          Allergies   Allergen Reactions    Acetaminophen Hives     Per spouse has small amount of hives, takes 1/2 and tolerates     Azithromycin Hives    Morphine Hallucinations     Muscle twitching. jerking    Oxaprozin Other (See Comments)     ELEVATED BLOOD PRESSURE    Oxycodone Anxiety         Vitals:    22 1300 22 1330 22 1345 22 1400   BP: 139/71 (!) 143/79 117/73 122/63   Pulse: 66 66 68 64   Resp: 17 30 26 20   Temp:       TempSrc:       SpO2: 93% 93% 90% 92%   Weight:       Height:             PHYSICAL EXAM:  General: Somnolent, NAD  HEENT: NC/AT, EOM are intact  Neck: supple, no JVD  Cardiovascular: RRR, S1, S2, no murmurs  Respiratory: Coarse sounds, wheezes  Abdomen: Soft, NT, ND  Back: No CVA tenderness, no paraspinal tenderness  Extremities: LE without pedal edema, no erythema  Neuro: CN are intact, no focal deficits  Skin: no rash or ulcers        I have personally reviewed patients laboratory data showing  Lab Results   Component Value Date    WBC 7.4 2022    HGB 10.0 (L) 2022    HCT 29.5 (L) 2022    MCV 91.3 2022     2022     No results found for: CKTOTAL, CKMB, CKMBINDEX, TROPONINI  Lab Results   Component Value Date    ANIONGAP 8 12/29/2022    CALCIUM 8.7 12/29/2022     12/29/2022    K 3.1 (L) 12/29/2022    CO2 29 12/29/2022     12/29/2022    BUN 13 12/29/2022    CREATININE 0.80 12/29/2022         I have personally reviewed patients EKG showing  Atrial paced rhythm      I have personally reviewed patients imaging showing  XR CHEST PORTABLE   Final Result   1. Only mild cardiomegaly seen which does appear improved from the prior study. Some component of early or mild heart failure is difficult to exclude given the   patient's acute symptoms, however. No potential acute process is otherwise   suggested.             Likely length of stay more than 2 midnights

## 2022-12-30 ENCOUNTER — APPOINTMENT (OUTPATIENT)
Dept: NON INVASIVE DIAGNOSTICS | Age: 77
End: 2022-12-30
Payer: MEDICARE

## 2022-12-30 LAB
ACCESSION NUMBER, LLC1M: ABNORMAL
ACINETOBACTER CALCOAC BAUMANNII COMPLEX BY PCR: NOT DETECTED
ANION GAP SERPL CALC-SCNC: 5 MMOL/L (ref 2–11)
BACTEROIDES FRAGILIS BY PCR: NOT DETECTED
BASOPHILS # BLD: 0 K/UL (ref 0–0.2)
BASOPHILS NFR BLD: 0 % (ref 0–2)
BIOFIRE TEST COMMENT: ABNORMAL
BUN SERPL-MCNC: 15 MG/DL (ref 8–23)
C ALBICANS DNA BLD POS QL NAA+NON-PROBE: NOT DETECTED
C GLABRATA DNA BLD POS QL NAA+NON-PROBE: NOT DETECTED
C KRUSEI DNA BLD POS QL NAA+NON-PROBE: NOT DETECTED
C PARAP DNA BLD POS QL NAA+NON-PROBE: NOT DETECTED
C TROPICLS DNA BLD POS QL NAA+NON-PROBE: NOT DETECTED
CALCIUM SERPL-MCNC: 8.9 MG/DL (ref 8.3–10.4)
CANDIDA AURIS BY PCR: NOT DETECTED
CHLORIDE SERPL-SCNC: 100 MMOL/L (ref 101–110)
CO2 SERPL-SCNC: 34 MMOL/L (ref 21–32)
CREAT SERPL-MCNC: 0.8 MG/DL (ref 0.8–1.5)
CRYPTOCOCCUS NEOFORMANS/GATTII BY PCR: NOT DETECTED
DIFFERENTIAL METHOD BLD: ABNORMAL
E CLOAC COMP DNA BLD POS NAA+NON-PROBE: NOT DETECTED
E COLI DNA BLD POS QL NAA+NON-PROBE: NOT DETECTED
ECHO AO ASC DIAM: 3.5 CM
ECHO AO ASCENDING AORTA INDEX: 1.55 CM/M2
ECHO AO ROOT DIAM: 3.5 CM
ECHO AO ROOT INDEX: 1.55 CM/M2
ECHO AV AREA PEAK VELOCITY: 2.5 CM2
ECHO AV AREA VTI: 2.5 CM2
ECHO AV AREA/BSA PEAK VELOCITY: 1.1 CM2/M2
ECHO AV AREA/BSA VTI: 1.1 CM2/M2
ECHO AV MEAN GRADIENT: 6 MMHG
ECHO AV MEAN VELOCITY: 1.1 M/S
ECHO AV PEAK GRADIENT: 12 MMHG
ECHO AV PEAK VELOCITY: 1.7 M/S
ECHO AV VELOCITY RATIO: 0.59
ECHO AV VTI: 36.4 CM
ECHO BSA: 2.29 M2
ECHO IVC PROX: 2.7 CM
ECHO LA AREA 2C: 25.1 CM2
ECHO LA AREA 4C: 24.4 CM2
ECHO LA DIAMETER INDEX: 2.04 CM/M2
ECHO LA DIAMETER: 4.6 CM
ECHO LA MAJOR AXIS: 6.3 CM
ECHO LA MINOR AXIS: 6.1 CM
ECHO LA TO AORTIC ROOT RATIO: 1.31
ECHO LA VOL 2C: 83 ML (ref 18–58)
ECHO LA VOL 4C: 80 ML (ref 18–58)
ECHO LA VOL BP: 82 ML (ref 18–58)
ECHO LA VOL/BSA BIPLANE: 36 ML/M2 (ref 16–34)
ECHO LA VOLUME INDEX A2C: 37 ML/M2 (ref 16–34)
ECHO LA VOLUME INDEX A4C: 35 ML/M2 (ref 16–34)
ECHO LV E' LATERAL VELOCITY: 10 CM/S
ECHO LV E' SEPTAL VELOCITY: 7 CM/S
ECHO LV EDV A2C: 139 ML
ECHO LV EDV A4C: 194 ML
ECHO LV EDV INDEX A4C: 86 ML/M2
ECHO LV EDV NDEX A2C: 62 ML/M2
ECHO LV EJECTION FRACTION A2C: 45 %
ECHO LV EJECTION FRACTION A4C: 52 %
ECHO LV EJECTION FRACTION BIPLANE: 49 % (ref 55–100)
ECHO LV ESV A2C: 76 ML
ECHO LV ESV A4C: 93 ML
ECHO LV ESV INDEX A2C: 34 ML/M2
ECHO LV ESV INDEX A4C: 41 ML/M2
ECHO LV FRACTIONAL SHORTENING: 25 % (ref 28–44)
ECHO LV INTERNAL DIMENSION DIASTOLE INDEX: 2.65 CM/M2
ECHO LV INTERNAL DIMENSION DIASTOLIC: 6 CM (ref 4.2–5.9)
ECHO LV INTERNAL DIMENSION SYSTOLIC INDEX: 1.99 CM/M2
ECHO LV INTERNAL DIMENSION SYSTOLIC: 4.5 CM
ECHO LV IVSD: 1.1 CM (ref 0.6–1)
ECHO LV MASS 2D: 279.6 G (ref 88–224)
ECHO LV MASS INDEX 2D: 123.7 G/M2 (ref 49–115)
ECHO LV POSTERIOR WALL DIASTOLIC: 1.1 CM (ref 0.6–1)
ECHO LV RELATIVE WALL THICKNESS RATIO: 0.37
ECHO LVOT AREA: 4.5 CM2
ECHO LVOT AV VTI INDEX: 0.56
ECHO LVOT DIAM: 2.4 CM
ECHO LVOT MEAN GRADIENT: 2 MMHG
ECHO LVOT PEAK GRADIENT: 4 MMHG
ECHO LVOT PEAK VELOCITY: 1 M/S
ECHO LVOT STROKE VOLUME INDEX: 40.6 ML/M2
ECHO LVOT SV: 91.8 ML
ECHO LVOT VTI: 20.3 CM
ECHO MV A VELOCITY: 1.14 M/S
ECHO MV AREA VTI: 2.2 CM2
ECHO MV E DECELERATION TIME (DT): 218 MS
ECHO MV E VELOCITY: 1.07 M/S
ECHO MV E/A RATIO: 0.94
ECHO MV E/E' LATERAL: 10.7
ECHO MV E/E' RATIO (AVERAGED): 12.99
ECHO MV E/E' SEPTAL: 15.29
ECHO MV LVOT VTI INDEX: 2.06
ECHO MV MAX VELOCITY: 1.2 M/S
ECHO MV MEAN GRADIENT: 2 MMHG
ECHO MV MEAN VELOCITY: 0.7 M/S
ECHO MV PEAK GRADIENT: 6 MMHG
ECHO MV VTI: 41.8 CM
ECHO RV BASAL DIMENSION: 4.8 CM
ECHO RV FREE WALL PEAK S': 18 CM/S
ECHO RV MID DIMENSION: 3.3 CM
ECHO RV TAPSE: 3 CM (ref 1.7–?)
ENTEROBACTERALES BY PCR: NOT DETECTED
ENTEROCOCCUS FAECALIS BY PCR: NOT DETECTED
ENTEROCOCCUS FAECIUM BY PCR: NOT DETECTED
EOSINOPHIL # BLD: 0 K/UL (ref 0–0.8)
EOSINOPHIL NFR BLD: 0 % (ref 0.5–7.8)
ERYTHROCYTE [DISTWIDTH] IN BLOOD BY AUTOMATED COUNT: 15.9 % (ref 11.9–14.6)
GLUCOSE BLD STRIP.AUTO-MCNC: 198 MG/DL (ref 65–100)
GLUCOSE BLD STRIP.AUTO-MCNC: 203 MG/DL (ref 65–100)
GLUCOSE BLD STRIP.AUTO-MCNC: 266 MG/DL (ref 65–100)
GLUCOSE BLD STRIP.AUTO-MCNC: 273 MG/DL (ref 65–100)
GLUCOSE BLD STRIP.AUTO-MCNC: 305 MG/DL (ref 65–100)
GLUCOSE SERPL-MCNC: 193 MG/DL (ref 65–100)
GP B STREP DNA BLD POS QL NAA+NON-PROBE: NOT DETECTED
HAEM INFLU DNA BLD POS QL NAA+NON-PROBE: NOT DETECTED
HCT VFR BLD AUTO: 31.9 % (ref 41.1–50.3)
HGB BLD-MCNC: 10.6 G/DL (ref 13.6–17.2)
IMM GRANULOCYTES # BLD AUTO: 0.1 K/UL (ref 0–0.5)
IMM GRANULOCYTES NFR BLD AUTO: 1 % (ref 0–5)
K OXYTOCA DNA BLD POS QL NAA+NON-PROBE: NOT DETECTED
KLEBSIELLA AEROGENES BY PCR: NOT DETECTED
KLEBSIELLA PNEUMONIAE GROUP BY PCR: NOT DETECTED
L MONOCYTOG DNA BLD POS QL NAA+NON-PROBE: NOT DETECTED
LV EF: 53 %
LVEF MODALITY: ABNORMAL
LYMPHOCYTES # BLD: 0.4 K/UL (ref 0.5–4.6)
LYMPHOCYTES NFR BLD: 8 % (ref 13–44)
MCH RBC QN AUTO: 30.9 PG (ref 26.1–32.9)
MCHC RBC AUTO-ENTMCNC: 33.2 G/DL (ref 31.4–35)
MCV RBC AUTO: 93 FL (ref 82–102)
MONOCYTES # BLD: 0.4 K/UL (ref 0.1–1.3)
MONOCYTES NFR BLD: 8 % (ref 4–12)
N MEN DNA BLD POS QL NAA+NON-PROBE: NOT DETECTED
NEUTS SEG # BLD: 4.2 K/UL (ref 1.7–8.2)
NEUTS SEG NFR BLD: 83 % (ref 43–78)
NRBC # BLD: 0 K/UL (ref 0–0.2)
P AERUGINOSA DNA BLD POS NAA+NON-PROBE: NOT DETECTED
PLATELET # BLD AUTO: 197 K/UL (ref 150–450)
PMV BLD AUTO: 9.8 FL (ref 9.4–12.3)
POTASSIUM SERPL-SCNC: 3.6 MMOL/L (ref 3.5–5.1)
PROTEUS SP DNA BLD POS QL NAA+NON-PROBE: NOT DETECTED
RBC # BLD AUTO: 3.43 M/UL (ref 4.23–5.6)
RESISTANT GENE TARGETS: ABNORMAL
S AUREUS DNA BLD POS QL NAA+NON-PROBE: NOT DETECTED
S AUREUS+CONS DNA BLD POS NAA+NON-PROBE: NOT DETECTED
S MARCESCENS DNA BLD POS NAA+NON-PROBE: NOT DETECTED
S PNEUM DNA BLD POS QL NAA+NON-PROBE: NOT DETECTED
S PYO DNA BLD POS QL NAA+NON-PROBE: NOT DETECTED
SALMONELLA SPECIES BY PCR: NOT DETECTED
SERVICE CMNT-IMP: ABNORMAL
SODIUM SERPL-SCNC: 139 MMOL/L (ref 133–143)
STAPHYLOCOCCUS EPIDERMIDIS BY PCR: NOT DETECTED
STAPHYLOCOCCUS LUGDUNENSIS BY PCR: NOT DETECTED
STENOTROPHOMONAS MALTOPHILIA BY PCR: NOT DETECTED
STREPTOCOCCUS DNA BLD POS NAA+NON-PROBE: DETECTED
WBC # BLD AUTO: 5.1 K/UL (ref 4.3–11.1)

## 2022-12-30 PROCEDURE — 93306 TTE W/DOPPLER COMPLETE: CPT | Performed by: INTERNAL MEDICINE

## 2022-12-30 PROCEDURE — 2700000000 HC OXYGEN THERAPY PER DAY

## 2022-12-30 PROCEDURE — 1100000000 HC RM PRIVATE

## 2022-12-30 PROCEDURE — 6360000004 HC RX CONTRAST MEDICATION: Performed by: INTERNAL MEDICINE

## 2022-12-30 PROCEDURE — C8929 TTE W OR WO FOL WCON,DOPPLER: HCPCS

## 2022-12-30 PROCEDURE — A4216 STERILE WATER/SALINE, 10 ML: HCPCS | Performed by: INTERNAL MEDICINE

## 2022-12-30 PROCEDURE — 36415 COLL VENOUS BLD VENIPUNCTURE: CPT

## 2022-12-30 PROCEDURE — 94760 N-INVAS EAR/PLS OXIMETRY 1: CPT

## 2022-12-30 PROCEDURE — 80048 BASIC METABOLIC PNL TOTAL CA: CPT

## 2022-12-30 PROCEDURE — 2580000003 HC RX 258: Performed by: INTERNAL MEDICINE

## 2022-12-30 PROCEDURE — 94640 AIRWAY INHALATION TREATMENT: CPT

## 2022-12-30 PROCEDURE — 85025 COMPLETE CBC W/AUTO DIFF WBC: CPT

## 2022-12-30 PROCEDURE — 82962 GLUCOSE BLOOD TEST: CPT

## 2022-12-30 PROCEDURE — 6360000002 HC RX W HCPCS: Performed by: INTERNAL MEDICINE

## 2022-12-30 PROCEDURE — 94761 N-INVAS EAR/PLS OXIMETRY MLT: CPT

## 2022-12-30 PROCEDURE — 87040 BLOOD CULTURE FOR BACTERIA: CPT

## 2022-12-30 PROCEDURE — 6360000002 HC RX W HCPCS: Performed by: FAMILY MEDICINE

## 2022-12-30 PROCEDURE — 6370000000 HC RX 637 (ALT 250 FOR IP): Performed by: INTERNAL MEDICINE

## 2022-12-30 RX ADMIN — PERFLUTREN 0.45 ML: 6.52 INJECTION, SUSPENSION INTRAVENOUS at 16:15

## 2022-12-30 RX ADMIN — METHYLPREDNISOLONE SODIUM SUCCINATE 40 MG: 125 INJECTION, POWDER, FOR SOLUTION INTRAMUSCULAR; INTRAVENOUS at 16:38

## 2022-12-30 RX ADMIN — MOMETASONE FUROATE AND FORMOTEROL FUMARATE DIHYDRATE 2 PUFF: 200; 5 AEROSOL RESPIRATORY (INHALATION) at 07:49

## 2022-12-30 RX ADMIN — TIOTROPIUM BROMIDE INHALATION SPRAY 2 PUFF: 3.12 SPRAY, METERED RESPIRATORY (INHALATION) at 07:49

## 2022-12-30 RX ADMIN — ALBUTEROL SULFATE 2.5 MG: 2.5 SOLUTION RESPIRATORY (INHALATION) at 15:17

## 2022-12-30 RX ADMIN — ROSUVASTATIN 10 MG: 10 TABLET, FILM COATED ORAL at 21:05

## 2022-12-30 RX ADMIN — ALBUTEROL SULFATE 2.5 MG: 2.5 SOLUTION RESPIRATORY (INHALATION) at 20:31

## 2022-12-30 RX ADMIN — APIXABAN 5 MG: 5 TABLET, FILM COATED ORAL at 09:06

## 2022-12-30 RX ADMIN — CEFTRIAXONE SODIUM 2000 MG: 2 INJECTION, POWDER, FOR SOLUTION INTRAMUSCULAR; INTRAVENOUS at 12:01

## 2022-12-30 RX ADMIN — METHYLPREDNISOLONE SODIUM SUCCINATE 40 MG: 125 INJECTION, POWDER, FOR SOLUTION INTRAMUSCULAR; INTRAVENOUS at 01:31

## 2022-12-30 RX ADMIN — ALBUTEROL SULFATE 2.5 MG: 2.5 SOLUTION RESPIRATORY (INHALATION) at 07:49

## 2022-12-30 RX ADMIN — SODIUM CHLORIDE, PRESERVATIVE FREE 10 ML: 5 INJECTION INTRAVENOUS at 09:07

## 2022-12-30 RX ADMIN — ALBUTEROL SULFATE 2.5 MG: 2.5 SOLUTION RESPIRATORY (INHALATION) at 11:30

## 2022-12-30 RX ADMIN — OSELTAMIVIR PHOSPHATE 75 MG: 75 CAPSULE ORAL at 09:06

## 2022-12-30 RX ADMIN — INSULIN LISPRO 4 UNITS: 100 INJECTION, SOLUTION INTRAVENOUS; SUBCUTANEOUS at 21:03

## 2022-12-30 RX ADMIN — CLOPIDOGREL BISULFATE 75 MG: 75 TABLET ORAL at 09:08

## 2022-12-30 RX ADMIN — FUROSEMIDE 40 MG: 40 TABLET ORAL at 09:06

## 2022-12-30 RX ADMIN — MAGNESIUM GLUCONATE 500 MG ORAL TABLET 400 MG: 500 TABLET ORAL at 09:08

## 2022-12-30 RX ADMIN — APIXABAN 5 MG: 5 TABLET, FILM COATED ORAL at 21:05

## 2022-12-30 RX ADMIN — AMIODARONE HYDROCHLORIDE 200 MG: 200 TABLET ORAL at 09:07

## 2022-12-30 RX ADMIN — METHYLPREDNISOLONE SODIUM SUCCINATE 40 MG: 125 INJECTION, POWDER, FOR SOLUTION INTRAMUSCULAR; INTRAVENOUS at 09:04

## 2022-12-30 RX ADMIN — OSELTAMIVIR PHOSPHATE 75 MG: 75 CAPSULE ORAL at 21:05

## 2022-12-30 RX ADMIN — SODIUM CHLORIDE, PRESERVATIVE FREE 5 ML: 5 INJECTION INTRAVENOUS at 21:06

## 2022-12-30 RX ADMIN — LATANOPROST 1 DROP: 50 SOLUTION OPHTHALMIC at 21:06

## 2022-12-30 RX ADMIN — MOMETASONE FUROATE AND FORMOTEROL FUMARATE DIHYDRATE 2 PUFF: 200; 5 AEROSOL RESPIRATORY (INHALATION) at 20:31

## 2022-12-30 RX ADMIN — INSULIN LISPRO 4 UNITS: 100 INJECTION, SOLUTION INTRAVENOUS; SUBCUTANEOUS at 12:03

## 2022-12-30 RX ADMIN — MONTELUKAST 10 MG: 10 TABLET, FILM COATED ORAL at 21:05

## 2022-12-30 RX ADMIN — INSULIN LISPRO 4 UNITS: 100 INJECTION, SOLUTION INTRAVENOUS; SUBCUTANEOUS at 16:58

## 2022-12-30 RX ADMIN — Medication 2500 MG: at 06:47

## 2022-12-30 NOTE — CARE COORDINATION
12/30/22 0854   Service Assessment   Patient Orientation Alert and Oriented   Cognition Alert   History Provided By Patient   Primary Caregiver Spouse   Support Systems Spouse/Significant Other   PCP Verified by CM Yes   Current Functional Level Assistance with the following:   Can patient return to prior living arrangement Unknown at present   Ability to make needs known: Good   Family able to assist with home care needs: Yes   Would you like for me to discuss the discharge plan with any other family members/significant others, and if so, who? Yes   Financial Resources Medicare   Social/Functional History   Lives With Spouse   Type of [address] to enter with rails   Entrance Stairs - Number of Steps 2   Receives Help From Family   ADL Assistance Needs assistance   Ambulation Assistance Needs assistance  (occassionally uses a walker)   Transfer Assistance Independent   Active  Yes   Confirmed patient has a pcp. Current with [facility]. History of rehab. DME: cpap, bsc, walker, wc  Recently he started using a walker because he's been weak. Wife assists with ADLs. Drives. CM following for discharge needs.

## 2022-12-30 NOTE — FLOWSHEET NOTE
12/30/22 0450   Treatment Team Notification   Reason for Communication Critical results   Type of Critical Result Laboratory   Critical Lab Result Type Culture   Team Member Name Dr. Kristen Avina Team Role Attending Provider   Method of Communication Secure Message   Response See orders   Notification Time [id number]     Lab called with blood culture results. Gram positive cocci- streptococcus. Orders received for IV Vancomycin.

## 2022-12-30 NOTE — ED NOTES
Perfect serve message sent to provider r/t pt drowsiness and wife reports pt uses CPAP when he sleeps. Wife brought CPAP with her and provider gave order to apply while pt is sleeping. CPAP was applied and NC had to be removed. Pt became hypoxic, CPAP was removed, and O2 reapplied via NC. RT notified to request O2 attachment for pt CPAP. Provider aware.       Marjan Fonseca RN  12/29/22 1943

## 2022-12-30 NOTE — PROGRESS NOTES
Progress Note    Patient: Leonardo Rai MRN: [id number]  SSN: [id number]    YOB: 1945  Age: 65 y.o. Sex: male      Admit Date: 12/29/2022    LOS: 1 day     Assessment and Plan:   69-year-old male with a past medical history of coronary artery disease status post stents, ischemic cardiomyopathy, heart failure with reduced ejection fraction status post ICD, SHERI on CPAP, PAD, COPD, atrial fibrillation on Eliquis, diabetes, hypertension, hyperlipidemia, that presents in the setting of worsening shortness of breath     1. Acute hypoxic respiratory failure secondary to COPD exacerbation likely in the setting of influenza infection and concerns of superimposed bacterial infection  -Oxygen therapy to attain oxygen saturation more than 92%  -Continue bronchodilators and systemic steroids  -Continue Tamiflu  -Continue home inhalers  -Continue Spiriva  -Continue montelukast  -Symptomatic management  -Discontinue vancomycin  -Start ceftriaxone    2. Strep bacteremia likely in the setting of pneumonia  -Start ceftriaxone  -Follow blood cultures negative  -Repeat blood cultures  -TTE     2. Acute metabolic encephalopathy  -CT of the brain without acute intracranial abnormalities  -Monitor mental status  -Avoid sedatives  -Delirium precautions    3. Hypokalemia  -Replete potassium  -Monitor potassium levels    4. Heart failure with reduced ejection fraction  -Continue Lasix  -Monitor volume status  -Strict input and output  -Daily weights  -TTE 7/30/2022 EF of 40 to 45%  -No radiologic findings of pulmonary edema at this time  -Hold carvedilol and valsartan for now since currently normotensive    5. Atrial fibrillation  -Telemetry monitoring  -Continue amiodarone  -Continue Eliquis pending CT of the brain    6. Coronary artery disease  -Continue Plavix and Crestor    7. Diabetes mellitus  -Insulin sliding scale  -Blood sugar checks before meals and at bedtime  -Hypoglycemic protocol    8.   SHERI  -CPAP at bedtime    DVT prophylaxis patient on Eliquis    Subjective:   78-year-old male with a past medical history of coronary artery disease status post stents, ischemic cardiomyopathy, heart failure with reduced ejection fraction status post ICD, SHERI on CPAP, PAD, COPD, atrial fibrillation on Eliquis, diabetes, hypertension, hyperlipidemia, that presents in the setting of worsening shortness of breath. Patient seen and examined at bedside. This morning he felt little tired. Otherwise denies any chest pain, no abdominal pain, no nausea or vomiting.     Objective:     Vitals:    12/30/22 0401 12/30/22 0716 12/30/22 1048 12/30/22 1054   BP: 134/72 139/73 136/69    Pulse: 63 61 60 65   Resp: 18 18 17    Temp: 98.2 °F (36.8 °C) 97.3 °F (36.3 °C) 98.1 °F (36.7 °C)    TempSrc: Axillary Oral Oral    SpO2: 92% 96% 93%    Weight:       Height:            Intake and Output:  Current Shift: 12/30 0701 - 12/30 1900  In: 10 [I.V.:10]  Out: -   Last three shifts: 12/28 1901 - 12/30 0700  In: -   Out: 1400 [Urine:1400]    ROS  10 ROS negative except from stated on subjective    Physical Exam:   General: Alert, oriented, NAD  HEENT: NC/AT, EOM are intact  Neck: supple, no JVD  Cardiovascular: RRR, S1, S2, no murmurs  Respiratory: Coarse sounds, wheezes  Abdomen: Soft, NT, ND  Back: No CVA tenderness, no paraspinal tenderness  Extremities: LE without pedal edema, no erythema  Neuro: A&O, CN are intact, no focal deficits  Skin: no rash or ulcers  Psych: good mood and affect    Lab/Data Review:  I have personally reviewed patients laboratory data showing  Recent Results (from the past 24 hour(s))   Arterial Blood Gas, POC    Collection Time: 12/29/22  3:22 PM   Result Value Ref Range    DEVICE NASAL CANNULA      pH, Arterial, POC 7.44 7.35 - 7.45      pCO2, Arterial, POC 46.4 (H) 35 - 45 MMHG    pO2, Arterial, POC 70 (L) 75 - 100 MMHG    HCO3, Mixed 31.3 (H) 22 - 26 MMOL/L    SO2c, Arterial, POC 94.1 (L) 95 - 98 %    Base Excess 6.2 mmol/L    POC Bill's Test Positive      Site RIGHT RADIAL      Specimen type: ARTERIAL      Performed by: RavindraCRT     FIO2 2     POCT Glucose    Collection Time: 12/29/22  5:01 PM   Result Value Ref Range    POC Glucose 134 (H) 65 - 100 mg/dL    Performed by: JARRELL DOBBS    Blood Culture 2    Collection Time: 12/29/22  6:31 PM    Specimen: Blood   Result Value Ref Range    Special Requests LEFT  Antecubital        Culture NO GROWTH AFTER 12 HOURS     Ammonia    Collection Time: 12/29/22  6:31 PM   Result Value Ref Range    Ammonia <10 (L) 11 - 32 UMOL/L   POCT Glucose    Collection Time: 12/29/22  9:22 PM   Result Value Ref Range    POC Glucose 203 (H) 65 - 100 mg/dL    Performed by: Semaj Art    CBC with Auto Differential    Collection Time: 12/30/22  5:22 AM   Result Value Ref Range    WBC 5.1 4.3 - 11.1 K/uL    RBC 3.43 (L) 4.23 - 5.6 M/uL    Hemoglobin 10.6 (L) 13.6 - 17.2 g/dL    Hematocrit 31.9 (L) 41.1 - 50.3 %    MCV 93.0 82 - 102 FL    MCH 30.9 26.1 - 32.9 PG    MCHC 33.2 31.4 - 35.0 g/dL    RDW 15.9 (H) 11.9 - 14.6 %    Platelets 854 699 - 313 K/uL    MPV 9.8 9.4 - 12.3 FL    nRBC 0.00 0.0 - 0.2 K/uL    Differential Type AUTOMATED      Seg Neutrophils 83 (H) 43 - 78 %    Lymphocytes 8 (L) 13 - 44 %    Monocytes 8 4.0 - 12.0 %    Eosinophils % 0 (L) 0.5 - 7.8 %    Basophils 0 0.0 - 2.0 %    Immature Granulocytes 1 0.0 - 5.0 %    Segs Absolute 4.2 1.7 - 8.2 K/UL    Absolute Lymph # 0.4 (L) 0.5 - 4.6 K/UL    Absolute Mono # 0.4 0.1 - 1.3 K/UL    Absolute Eos # 0.0 0.0 - 0.8 K/UL    Basophils Absolute 0.0 0.0 - 0.2 K/UL    Absolute Immature Granulocyte 0.1 0.0 - 0.5 K/UL   Basic Metabolic Panel    Collection Time: 12/30/22  5:22 AM   Result Value Ref Range    Sodium 139 133 - 143 mmol/L    Potassium 3.6 3.5 - 5.1 mmol/L    Chloride 100 (L) 101 - 110 mmol/L    CO2 34 (H) 21 - 32 mmol/L    Anion Gap 5 2 - 11 mmol/L    Glucose 193 (H) 65 - 100 mg/dL    BUN 15 8 - 23 MG/DL    Creatinine 0. 80 0.8 - 1.5 MG/DL    Est, Glom Filt Rate >60 >60 ml/min/1.73m2    Calcium 8.9 8.3 - 10.4 MG/DL   POCT Glucose    Collection Time: 12/30/22  7:18 AM   Result Value Ref Range    POC Glucose 198 (H) 65 - 100 mg/dL    Performed by: Mone    POCT Glucose    Collection Time: 12/30/22 11:03 AM   Result Value Ref Range    POC Glucose 273 (H) 65 - 100 mg/dL    Performed by: Shi Sheriff         Image:  I have personally reviewed patients imaging showing  CT HEAD WO CONTRAST   Final Result   1. No acute intracranial process evident by noncontrast CT study of the head. 2.  Moderate right mastoid effusion with fluid entering the right middle ear. This can been indicator for right otomastoiditis. Recommend clinical   correlation. This report was made using voice transcription. Despite my best efforts to avoid   any, transcription errors may persist. If there is any question about the   accuracy of the report or need for clarification, then please call [phone], or text me through [employer] for clarification or correction. XR CHEST PORTABLE   Final Result   1. Only mild cardiomegaly seen which does appear improved from the prior study. Some component of early or mild heart failure is difficult to exclude given the   patient's acute symptoms, however. No potential acute process is otherwise   suggested.            Current Facility-Administered Medications   Medication Dose Route Frequency Provider Last Rate Last Admin    cefTRIAXone (ROCEPHIN) 2,000 mg in sodium chloride 0.9 % 50 mL IVPB mini-bag  2,000 mg IntraVENous Q24H Scot Dan MD   Stopped at 12/30/22 1255    sodium chloride flush 0.9 % injection 5-40 mL  5-40 mL IntraVENous 2 times per day Scot Dan MD   10 mL at 12/30/22 0907    sodium chloride flush 0.9 % injection 5-40 mL  5-40 mL IntraVENous PRN Scot Dan MD        0.9 % sodium chloride infusion   IntraVENous PRN Scot Dan MD ondansetron (ZOFRAN-ODT) disintegrating tablet 4 mg  4 mg Oral Q8H PRN Rachel Ordonez MD        Or    [facility]ans[facility]ron [facility] injection 4 mg  4 mg IntraVENous Q6H PRN Rachel Ordonez MD        polyethylene glycol (GLYCOLAX) packet 17 g  17 g Oral Daily PRN Rachel Ordonez MD        oseltamivir (TAMIFLU) capsule 75 mg  75 mg Oral BID Rachel Ordonez MD   75 mg at 12/30/22 8680    amiodarone (CORDARONE) tablet 200 mg  200 mg Oral Daily Rachel Ordonez MD   200 mg at 12/30/22 4509    apixaban (ELIQUIS) tablet 5 mg  5 mg Oral Q12H Rachel Ordonez MD   5 mg at 12/30/22 8232    [Held by provider] carvedilol (COREG) tablet 25 mg  25 mg Oral BID WC Rachel Ordonez MD        clopidogrel (PLAVIX) tablet 75 mg  75 mg Oral Daily Rachel Ordonez MD   75 mg at 12/30/22 0908    mometasone-formoterol (DULERA) 200-5 MCG/ACT inhaler 2 puff  2 puff Inhalation BID Rachel Ordonez MD   2 puff at 12/30/22 0749    latanoprost (XALATAN) 0.005 % ophthalmic solution 1 drop  1 drop Both Eyes Nightly Rachel Ordonez MD   1 drop at 12/29/22 2200    magnesium oxide (MAG-OX) tablet 400 mg  400 mg Oral Daily Rachel Ordonez MD   400 mg at 12/30/22 0908    montelukast (SINGULAIR) tablet 10 mg  10 mg Oral Nightly Rachel Ordonez MD   10 mg at 12/29/22 2200    rosuvastatin (CRESTOR) tablet 10 mg  10 mg Oral Nightly Rachel Ordonez MD   10 mg at 12/29/22 2200    [Held by provider] valsartan (DIOVAN) tablet 80 mg  80 mg Oral Daily Rachel Ordonez MD        glucose chewable tablet 16 g  4 tablet Oral PRN Rachel Ordonez MD        dextrose bolus 10% 125 mL  125 mL IntraVENous PRN Rachel Ordonez MD        Or    dextrose bolus 10% 250 mL  250 mL IntraVENous PRN Rachel Ordonez MD        glucagon (rDNA) injection 1 mg  1 mg SubCUTAneous PRN Rachel Ordonez MD        dextrose 10 % infusion   IntraVENous Continuous PRN Rachel Ordonez MD        insulin lispro (HUMALOG) injection vial 0-8 Units  0-8 Units SubCUTAneous TID WC Joel Méndez MD   4 Units at 12/30/22 1203    insulin lispro (HUMALOG) injection vial 0-4 Units  0-4 Units SubCUTAneous Nightly Joel Méndez MD        methylPREDNISolone sodium (SOLU-MEDROL) injection 40 mg  40 mg IntraVENous Q8H Joel Méndez MD   40 mg at 12/30/22 0904    albuterol (PROVENTIL) nebulizer solution 2.5 mg  2.5 mg Nebulization 4x daily Joel Méndez MD   2.5 mg at 12/30/22 1130    albuterol (PROVENTIL) nebulizer solution 2.5 mg  2.5 mg Nebulization Q6H PRN Joel Méndez MD        tiotropium (SPIRIVA RESPIMAT) 2.5 MCG/ACT inhaler 2 puff  2 puff Inhalation Daily Joel Méndez MD   2 puff at 12/30/22 0749    potassium chloride (KLOR-CON M) extended release tablet 40 mEq  40 mEq Oral Once Joel Méndez MD        furosemide (LASIX) tablet 40 mg  40 mg Oral Daily Joel Méndez MD   40 mg at 12/30/22 [facility] problems     Patient Active Problem List   Diagnosis    H/O endarterectomy    Arterial degeneration    Atrial fibrillation (HCC)    Complicated wound infection    Allergic rhinitis    Elevated troponin    HTN (hypertension)    Pulmonary emphysema ([facility] [address]    Atherosclerosis of native arteries of extremity with intermittent claudication (HCC)    Severe obesity (BMI 35.0-39. 9) with comorbidity ([facility] [address]    COPD (chronic obstructive pulmonary disease) (HCC)    Personal history of tobacco use, presenting hazards to health    Intermittent spinal claudication (HCC)    Cigarette nicotine dependence in remission    Irregular heart beat    Acute metabolic encephalopathy    Chest pain    Hiatal hernia    Diabetic neuropathy (HCC)    Normocytic anemia    Chronic pain of right knee    Sleep apnea    Osteoarthritis    Encounter for long-term (current) drug use    Ischemic cardiomyopathy    Ventricular tachycardia    VT (ventricular tachycardia)    Coronary artery disease involving native coronary artery of native heart without angina pectoris    Benign neoplasm of colon    PAD (peripheral artery disease) (HCC)    Asthma    Orthostatic hypotension    Hyperlipidemia    S/P angioplasty with stent    DM (diabetes mellitus) type II, controlled, with peripheral vascular disorder (HCC)    Toe laceration    Obstructive sleep apnea    MINI (acute kidney injury) ([address]    Type 2 diabetes with nephropathy (HCC)    Hypoxia    Abnormal nuclear cardiac imaging test    PVC's (premature ventricular contractions)    Asbestos exposure    Sepsis ([facility] [address]    ICD (implantable cardioverter-defibrillator) in place    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, vertebra(e), init ([address]    General weakness    Acute cystitis without hematuria    Low back pain without sciatica    Back pain    DNI (do not intubate)    Elevated liver enzymes    Anemia    Hyponatremia    Physical debility    Lumbar discitis    Psoas muscle abscess (HCC)    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        Signed By: Sharmin Palmer MD     December 30, 2022

## 2022-12-30 NOTE — WOUND CARE
Pt seen for left heel wound and buttocks wound. Pt is a current patient of [address] [place] [address] wound clinic and has the left heel wound managed there. Buttocks and sacrum intact, no open areas. Left heel with slough and pink tissue present in the base and red scabbed area on anterior foot from tight gauze and ace wrap per wife. Recommend xeroform over each area and cover with foam dressing, change daily. Wife to bring offloading boot from home, Until present use pillows for offloading, wound team will continue to follow while in acute care setting.

## 2022-12-30 NOTE — PROGRESS NOTES
[facility] Pharmacokinetic Monitoring Service - Vancomycin     Edgar Rebolledo is a 65 y.o. male starting on vancomycin therapy for bloodstream. Pharmacy consulted by santhosh hall for monitoring and adjustment. Target Concentration: Goal AUC/GIOVANNY 400-600 mg*hr/L    Additional Antimicrobials: -    Patient eligible for piperacillin-tazobactam to cefepime auto-substitution per P&T approved protocol? N/A    Pertinent Laboratory Values: Wt Readings from Last 1 Encounters:   12/29/22 225 lb (102.1 kg)     Temp Readings from Last 1 Encounters:   12/30/22 98.2 °F (36.8 °C) (Axillary)     Recent Labs     12/29/22  1018 12/29/22  1259   BUN 13  --    CREATININE 0.80  --    WBC 7.4  --    PROCAL  --  <0.05     Estimated Creatinine Clearance: 96 mL/min (based on SCr of 0.8 mg/dL). Lab Results   Component Value Date/Time    VANCOTROUGH 12.2 09/07/2022 09:15 AM    VANCORANDOM 16.2 09/01/2022 06:30 AM       MRSA Nasal Swab: N/A. Non-respiratory infection.     Plan:  Dosing recommendations based on Bayesian software  Start vancomycin 2.5gm x1 then 1gm q12hr  Anticipated AUC of 477 and trough concentration of 15.6 at steady state  Renal labs as indicated   Vancomycin concentrations will be ordered as clinically appropriate   Pharmacy will continue to monitor patient and adjust therapy as indicated    Thank you for the consult,  Kamran Barahona, [facility]

## 2022-12-30 NOTE — PROGRESS NOTES
Physician Progress Note      PATIENT:               Moises Carcamo  [facility] #:                  [id number]  :                       1945  ADMIT DATE:       2022 10:02 AM  [address] [place] DATE:  RESPONDING  PROVIDER #:        Ernie Morgan MD          QUERY TEXT:    Patient admitted with Influenza A/COPDe noted to have atrial fibrillation and   is maintained on Eliquis. If possible, please document in progress notes and   discharge summary if you are evaluating and/or treating any of the following: The medical record reflects the following:  Risk Factors: afib  Clinical Indicators: 69yo hx chf  Treatment: Eliquis  Options provided:  -- Secondary hypercoagulable state in a patient with atrial fibrillation  -- Other - I will add my own diagnosis  -- Disagree - Not applicable / Not valid  -- Disagree - Clinically unable to determine / Unknown  -- Refer to Clinical Documentation Reviewer    PROVIDER RESPONSE TEXT:    This patient has secondary hypercoagulable state in a patient with atrial   fibrillation.     Query created by: Alea Nickerson on 2022 1:41 PM      Electronically signed by:  Ernie Morgan MD 2022 1:43 PM

## 2022-12-31 LAB
ANION GAP SERPL CALC-SCNC: 6 MMOL/L (ref 2–11)
BASOPHILS # BLD: 0 K/UL (ref 0–0.2)
BASOPHILS NFR BLD: 0 % (ref 0–2)
BUN SERPL-MCNC: 19 MG/DL (ref 8–23)
CALCIUM SERPL-MCNC: 8.6 MG/DL (ref 8.3–10.4)
CHLORIDE SERPL-SCNC: 100 MMOL/L (ref 101–110)
CO2 SERPL-SCNC: 32 MMOL/L (ref 21–32)
CREAT SERPL-MCNC: 1 MG/DL (ref 0.8–1.5)
DIFFERENTIAL METHOD BLD: ABNORMAL
EOSINOPHIL # BLD: 0 K/UL (ref 0–0.8)
EOSINOPHIL NFR BLD: 0 % (ref 0.5–7.8)
ERYTHROCYTE [DISTWIDTH] IN BLOOD BY AUTOMATED COUNT: 15.5 % (ref 11.9–14.6)
GLUCOSE BLD STRIP.AUTO-MCNC: 229 MG/DL (ref 65–100)
GLUCOSE BLD STRIP.AUTO-MCNC: 306 MG/DL (ref 65–100)
GLUCOSE BLD STRIP.AUTO-MCNC: 342 MG/DL (ref 65–100)
GLUCOSE BLD STRIP.AUTO-MCNC: 354 MG/DL (ref 65–100)
GLUCOSE SERPL-MCNC: 245 MG/DL (ref 65–100)
HCT VFR BLD AUTO: 29.9 % (ref 41.1–50.3)
HGB BLD-MCNC: 9.9 G/DL (ref 13.6–17.2)
IMM GRANULOCYTES # BLD AUTO: 0.1 K/UL (ref 0–0.5)
IMM GRANULOCYTES NFR BLD AUTO: 1 % (ref 0–5)
LYMPHOCYTES # BLD: 0.5 K/UL (ref 0.5–4.6)
LYMPHOCYTES NFR BLD: 10 % (ref 13–44)
MCH RBC QN AUTO: 30.7 PG (ref 26.1–32.9)
MCHC RBC AUTO-ENTMCNC: 33.1 G/DL (ref 31.4–35)
MCV RBC AUTO: 92.6 FL (ref 82–102)
MONOCYTES # BLD: 0.5 K/UL (ref 0.1–1.3)
MONOCYTES NFR BLD: 10 % (ref 4–12)
NEUTS SEG # BLD: 3.7 K/UL (ref 1.7–8.2)
NEUTS SEG NFR BLD: 79 % (ref 43–78)
NRBC # BLD: 0 K/UL (ref 0–0.2)
PLATELET # BLD AUTO: 207 K/UL (ref 150–450)
PMV BLD AUTO: 11.1 FL (ref 9.4–12.3)
POTASSIUM SERPL-SCNC: 3.2 MMOL/L (ref 3.5–5.1)
RBC # BLD AUTO: 3.23 M/UL (ref 4.23–5.6)
SERVICE CMNT-IMP: ABNORMAL
SODIUM SERPL-SCNC: 138 MMOL/L (ref 133–143)
WBC # BLD AUTO: 4.7 K/UL (ref 4.3–11.1)

## 2022-12-31 PROCEDURE — 6360000002 HC RX W HCPCS: Performed by: INTERNAL MEDICINE

## 2022-12-31 PROCEDURE — 1100000000 HC RM PRIVATE

## 2022-12-31 PROCEDURE — 80048 BASIC METABOLIC PNL TOTAL CA: CPT

## 2022-12-31 PROCEDURE — 94760 N-INVAS EAR/PLS OXIMETRY 1: CPT

## 2022-12-31 PROCEDURE — 36415 COLL VENOUS BLD VENIPUNCTURE: CPT

## 2022-12-31 PROCEDURE — 6370000000 HC RX 637 (ALT 250 FOR IP): Performed by: INTERNAL MEDICINE

## 2022-12-31 PROCEDURE — 94640 AIRWAY INHALATION TREATMENT: CPT

## 2022-12-31 PROCEDURE — 2580000003 HC RX 258: Performed by: INTERNAL MEDICINE

## 2022-12-31 PROCEDURE — 82962 GLUCOSE BLOOD TEST: CPT

## 2022-12-31 PROCEDURE — 85025 COMPLETE CBC W/AUTO DIFF WBC: CPT

## 2022-12-31 PROCEDURE — 94761 N-INVAS EAR/PLS OXIMETRY MLT: CPT

## 2022-12-31 RX ADMIN — CEFTRIAXONE SODIUM 2000 MG: 2 INJECTION, POWDER, FOR SOLUTION INTRAMUSCULAR; INTRAVENOUS at 11:47

## 2022-12-31 RX ADMIN — TIOTROPIUM BROMIDE INHALATION SPRAY 2 PUFF: 3.12 SPRAY, METERED RESPIRATORY (INHALATION) at 07:18

## 2022-12-31 RX ADMIN — ALBUTEROL SULFATE 2.5 MG: 2.5 SOLUTION RESPIRATORY (INHALATION) at 15:09

## 2022-12-31 RX ADMIN — OSELTAMIVIR PHOSPHATE 75 MG: 75 CAPSULE ORAL at 08:30

## 2022-12-31 RX ADMIN — FUROSEMIDE 40 MG: 40 TABLET ORAL at 08:30

## 2022-12-31 RX ADMIN — ALBUTEROL SULFATE 2.5 MG: 2.5 SOLUTION RESPIRATORY (INHALATION) at 07:18

## 2022-12-31 RX ADMIN — APIXABAN 5 MG: 5 TABLET, FILM COATED ORAL at 21:27

## 2022-12-31 RX ADMIN — METHYLPREDNISOLONE SODIUM SUCCINATE 40 MG: 125 INJECTION, POWDER, FOR SOLUTION INTRAMUSCULAR; INTRAVENOUS at 17:14

## 2022-12-31 RX ADMIN — INSULIN LISPRO 2 UNITS: 100 INJECTION, SOLUTION INTRAVENOUS; SUBCUTANEOUS at 08:31

## 2022-12-31 RX ADMIN — SODIUM CHLORIDE, PRESERVATIVE FREE 10 ML: 5 INJECTION INTRAVENOUS at 21:54

## 2022-12-31 RX ADMIN — ALBUTEROL SULFATE 2.5 MG: 2.5 SOLUTION RESPIRATORY (INHALATION) at 11:09

## 2022-12-31 RX ADMIN — MONTELUKAST 10 MG: 10 TABLET, FILM COATED ORAL at 21:27

## 2022-12-31 RX ADMIN — INSULIN LISPRO 6 UNITS: 100 INJECTION, SOLUTION INTRAVENOUS; SUBCUTANEOUS at 11:48

## 2022-12-31 RX ADMIN — INSULIN LISPRO 8 UNITS: 100 INJECTION, SOLUTION INTRAVENOUS; SUBCUTANEOUS at 17:14

## 2022-12-31 RX ADMIN — OSELTAMIVIR PHOSPHATE 75 MG: 75 CAPSULE ORAL at 21:27

## 2022-12-31 RX ADMIN — MAGNESIUM GLUCONATE 500 MG ORAL TABLET 400 MG: 500 TABLET ORAL at 08:30

## 2022-12-31 RX ADMIN — APIXABAN 5 MG: 5 TABLET, FILM COATED ORAL at 08:30

## 2022-12-31 RX ADMIN — ROSUVASTATIN 10 MG: 10 TABLET, FILM COATED ORAL at 21:27

## 2022-12-31 RX ADMIN — SODIUM CHLORIDE, PRESERVATIVE FREE 10 ML: 5 INJECTION INTRAVENOUS at 08:31

## 2022-12-31 RX ADMIN — METHYLPREDNISOLONE SODIUM SUCCINATE 40 MG: 125 INJECTION, POWDER, FOR SOLUTION INTRAMUSCULAR; INTRAVENOUS at 01:59

## 2022-12-31 RX ADMIN — CLOPIDOGREL BISULFATE 75 MG: 75 TABLET ORAL at 08:30

## 2022-12-31 RX ADMIN — LATANOPROST 1 DROP: 50 SOLUTION OPHTHALMIC at 21:27

## 2022-12-31 RX ADMIN — MOMETASONE FUROATE AND FORMOTEROL FUMARATE DIHYDRATE 2 PUFF: 200; 5 AEROSOL RESPIRATORY (INHALATION) at 07:18

## 2022-12-31 RX ADMIN — AMIODARONE HYDROCHLORIDE 200 MG: 200 TABLET ORAL at 08:30

## 2022-12-31 RX ADMIN — INSULIN LISPRO 4 UNITS: 100 INJECTION, SOLUTION INTRAVENOUS; SUBCUTANEOUS at 21:27

## 2022-12-31 RX ADMIN — METHYLPREDNISOLONE SODIUM SUCCINATE 40 MG: 125 INJECTION, POWDER, FOR SOLUTION INTRAMUSCULAR; INTRAVENOUS at 08:31

## 2022-12-31 NOTE — PROGRESS NOTES
Patient resting quietly in bed. Respirations even and unlabored. On CPAP with 2L bleed in. No signs of distress. No needs expressed. To give report to oncoming RN.

## 2022-12-31 NOTE — PROGRESS NOTES
Progress Note    Patient: Edgar Rebolledo MRN: [id number]  SSN: [id number]    YOB: 1945  Age: 65 y.o. Sex: male      Admit Date: 12/29/2022    LOS: 2 days     Assessment and Plan:   54-year-old male with a past medical history of coronary artery disease status post stents, ischemic cardiomyopathy, heart failure with reduced ejection fraction status post ICD, SHERI on CPAP, PAD, COPD, atrial fibrillation on Eliquis, diabetes, hypertension, hyperlipidemia, that presents in the setting of worsening shortness of breath     1. Acute hypoxic respiratory failure secondary to COPD exacerbation likely in the setting of influenza infection and concerns of superimposed bacterial infection  -Oxygen therapy to attain oxygen saturation more than 92%  -Continue bronchodilators and systemic steroids  -Continue Tamiflu  -Continue home inhalers  -Continue Spiriva  -Continue montelukast  -Symptomatic management  -Continue ceftriaxone    2. Strep bacteremia likely in the setting of pneumonia  -Continue ceftriaxone  -Follow blood cultures negative  -Repeat blood cultures  -TTE     2. Acute metabolic encephalopathy  -CT of the brain without acute intracranial abnormalities  -Monitor mental status  -Avoid sedatives  -Delirium precautions    3. Hypokalemia  -Replete potassium  -Monitor potassium levels    4. Heart failure with reduced ejection fraction  -Continue Lasix  -Monitor volume status  -Strict input and output  -Daily weights  -TTE 7/30/2022 EF of 40 to 45%  -No radiologic findings of pulmonary edema at this time  -Hold carvedilol and valsartan for now since currently normotensive    5. Atrial fibrillation  -Telemetry monitoring  -Continue amiodarone  -Continue Eliquis pending CT of the brain    6. Coronary artery disease  -Continue Plavix and Crestor    7. Diabetes mellitus  -Insulin sliding scale  -Blood sugar checks before meals and at bedtime  -Hypoglycemic protocol    8.   SHERI  -CPAP at bedtime    DVT prophylaxis patient on Eliquis    Subjective:   79-year-old male with a past medical history of coronary artery disease status post stents, ischemic cardiomyopathy, heart failure with reduced ejection fraction status post ICD, SHERI on CPAP, PAD, COPD, atrial fibrillation on Eliquis, diabetes, hypertension, hyperlipidemia, that presents in the setting of worsening shortness of breath. Patient seen and examined at bedside. This morning he felt little tired. Otherwise denies any chest pain, no abdominal pain, no nausea or vomiting. Objective:     Vitals:    12/31/22 0721 12/31/22 0749 12/31/22 1000 12/31/22 1121   BP:  (!) 152/90  121/70   Pulse:  63 68 61   Resp:  16  16   Temp:  97.7 °F (36.5 °C)  97.5 °F (36.4 °C)   TempSrc:  Oral  Oral   SpO2: 93% 93%  92%   Weight:       Height:            Intake and Output:  Current Shift: No intake/output data recorded.   Last three shifts: 12/29 1901 - 12/31 0700  In: 10 [I.V.:10]  Out: 1900 [Urine:1900]    ROS  10 ROS negative except from stated on subjective    Physical Exam:   General: Alert, oriented, NAD  HEENT: NC/AT, EOM are intact  Neck: supple, no JVD  Cardiovascular: RRR, S1, S2, no murmurs  Respiratory: Coarse sounds, wheezes  Abdomen: Soft, NT, ND  Back: No CVA tenderness, no paraspinal tenderness  Extremities: LE without pedal edema, no erythema  Neuro: A&O, CN are intact, no focal deficits  Skin: no rash or ulcers  Psych: good mood and affect    Lab/Data Review:  I have personally reviewed patients laboratory data showing  Recent Results (from the past 24 hour(s))   Transthoracic echocardiogram (TTE) complete with contrast, bubble, strain, and 3D PRN    Collection Time: 12/30/22  3:30 PM   Result Value Ref Range    LV EDV A2C 139 mL    LV EDV A4C 194 mL    LV ESV A2C 76 mL    LV ESV A4C 93 mL    IVSd 1.1 (A) 0.6 - 1.0 cm    LVIDd 6.0 (A) 4.2 - 5.9 cm    LVIDs 4.5 cm    LVOT Diameter 2.4 cm    LVOT Mean Gradient 2 mmHg    LVOT VTI 20.3 cm    LVOT Peak Velocity 1.0 m/s    LVOT Peak Gradient 4 mmHg    LVPWd 1.1 (A) 0.6 - 1.0 cm    LV E' Lateral Velocity 10 cm/s    LV E' Septal Velocity 7 cm/s    LV Ejection Fraction A2C 45 %    LV Ejection Fraction A4C 52 %    EF BP 49 (A) 55 - 100 %    LVOT Area 4.5 cm2    LVOT SV 91.8 ml    LA Minor Axis 6.1 cm    LA Major [place] 6.3 cm    LA Area 2C 25.1 cm2    LA Area 4C 24.4 cm2    LA Volume 2C 83 (A) 18 - 58 mL    LA Volume 4C 80 (A) 18 - 58 mL    LA Volume BP 82 (A) 18 - 58 mL    LA Diameter 4.6 cm    AV Mean Velocity 1.1 m/s    AV Mean Gradient 6 mmHg    AV VTI 36.4 cm    AV Peak Velocity 1.7 m/s    AV Peak Gradient 12 mmHg    AV Area by VTI 2.5 cm2    AV Area by Peak Velocity 2.5 cm2    Aortic Root 3.5 cm    Ascending Aorta 3.5 cm    IVC Proxmal 2.7 cm    MV E Wave Deceleration Time 218.0 ms    MV A Velocity 1.14 m/s    MV E Velocity 1.07 m/s    MV Mean Gradient 2 mmHg    MV VTI 41.8 cm    MV Mean Velocity 0.7 m/s    MV Max Velocity 1.2 m/s    MV Peak Gradient 6 mmHg    MV Area by VTI 2.2 cm2    RV Basal Dimension 4.8 cm    RV Mid Dimension 3.3 cm    RV Free Wall Peak S' 18 cm/s    TAPSE 3.0 1.7 cm    Body Surface Area 2.29 m2    Fractional Shortening 2D 25 28 - 44 %    LV ESV Index A4C 41 mL/m2    LV EDV Index A4C 86 mL/m2    LV ESV Index A2C 34 mL/m2    LV EDV Index A2C 62 mL/m2    LVIDd Index 2.65 cm/m2    LVIDs Index 1.99 cm/m2    LV RWT Ratio 0.37     LV Mass 2D 279.6 (A) 88 - 224 g    LV Mass 2D Index 123.7 (A) 49 - 115 g/m2    MV E/A 0.94     E/E' Ratio (Averaged) 12.99     E/E' Lateral 10.70     E/E' Septal 15.29     LA Volume Index BP 36 (A) 16 - 34 ml/m2    LVOT Stroke Volume Index 40.6 mL/m2    LA Volume Index 2C 37 (A) 16 - 34 mL/m2    LA Volume Index 4C 35 (A) 16 - 34 mL/m2    LA Size Index 2.04 cm/m2    LA/AO Root Ratio 1.31     Ao Root Index 1.55 cm/m2    Ascending Aorta Index 1.55 cm/m2    AV Velocity Ratio 0.59     LVOT:AV VTI Index 0.56     CORI/BSA VTI 1.1 cm2/m2    CORI/BSA Peak Velocity 1.1 cm2/m2    MV:LVOT VTI Index 2.06    Culture, Blood 1    Collection Time: 12/30/22  4:03 PM    Specimen: Blood   Result Value Ref Range    Special Requests RIGHT  HAND        Culture NO GROWTH AFTER 13 HOURS     POCT Glucose    Collection Time: 12/30/22  4:34 PM   Result Value Ref Range    POC Glucose 266 (H) 65 - 100 mg/dL    Performed by: Mone    POCT Glucose    Collection Time: 12/30/22  8:23 PM   Result Value Ref Range    POC Glucose 305 (H) 65 - 100 mg/dL    Performed by: Blessign    CBC with Auto Differential    Collection Time: 12/31/22  3:47 AM   Result Value Ref Range    WBC 4.7 4.3 - 11.1 K/uL    RBC 3.23 (L) 4.23 - 5.6 M/uL    Hemoglobin 9.9 (L) 13.6 - 17.2 g/dL    Hematocrit 29.9 (L) 41.1 - 50.3 %    MCV 92.6 82 - 102 FL    MCH 30.7 26.1 - 32.9 PG    MCHC 33.1 31.4 - 35.0 g/dL    RDW 15.5 (H) 11.9 - 14.6 %    Platelets 963 870 - 751 K/uL    MPV 11.1 9.4 - 12.3 FL    nRBC 0.00 0.0 - 0.2 K/uL    Differential Type AUTOMATED      Seg Neutrophils 79 (H) 43 - 78 %    Lymphocytes 10 (L) 13 - 44 %    Monocytes 10 4.0 - 12.0 %    Eosinophils % 0 (L) 0.5 - 7.8 %    Basophils 0 0.0 - 2.0 %    Immature Granulocytes 1 0.0 - 5.0 %    Segs Absolute 3.7 1.7 - 8.2 K/UL    Absolute Lymph # 0.5 0.5 - 4.6 K/UL    Absolute Mono # 0.5 0.1 - 1.3 K/UL    Absolute Eos # 0.0 0.0 - 0.8 K/UL    Basophils Absolute 0.0 0.0 - 0.2 K/UL    Absolute Immature Granulocyte 0.1 0.0 - 0.5 K/UL   Basic Metabolic Panel    Collection Time: 12/31/22  3:47 AM   Result Value Ref Range    Sodium 138 133 - 143 mmol/L    Potassium 3.2 (L) 3.5 - 5.1 mmol/L    Chloride 100 (L) 101 - 110 mmol/L    CO2 32 21 - 32 mmol/L    Anion Gap 6 2 - 11 mmol/L    Glucose 245 (H) 65 - 100 mg/dL    BUN 19 8 - 23 MG/DL    Creatinine 1.00 0.8 - 1.5 MG/DL    Est, Glom Filt Rate >60 >60 ml/min/1.73m2    Calcium 8.6 8.3 - 10.4 MG/DL   POCT Glucose    Collection Time: 12/31/22  7:50 AM   Result Value Ref Range    POC Glucose 229 (H) 65 - 100 mg/dL    Performed by: Sulma    POCT Glucose    Collection Time: 12/31/22 11:21 AM   Result Value Ref Range    POC Glucose 342 (H) 65 - 100 mg/dL    Performed by: Sulma         Image:  I have personally reviewed patients imaging showing  CT HEAD WO CONTRAST   Final Result   1. No acute intracranial process evident by noncontrast CT study of the head. 2.  Moderate right mastoid effusion with fluid entering the right middle ear. This can been indicator for right otomastoiditis. Recommend clinical   correlation. This report was made using voice transcription. Despite my best efforts to avoid   any, transcription errors may persist. If there is any question about the   accuracy of the report or need for clarification, then please call [phone], or text me through [email or web address] for clarification or correction. XR CHEST PORTABLE   Final Result   1. Only mild cardiomegaly seen which does appear improved from the prior study. Some component of early or mild heart failure is difficult to exclude given the   patient's acute symptoms, however. No potential acute process is otherwise   suggested.            Current Facility-Administered Medications   Medication Dose Route Frequency Provider Last Rate Last Admin    cefTRIAXone (ROCEPHIN) 2,000 mg in sodium chloride 0.9 % 50 mL IVPB mini-bag  2,000 mg IntraVENous Q24H Sid Jensen MD   Stopped at 12/31/22 1250    sodium chloride flush 0.9 % injection 5-40 mL  5-40 mL IntraVENous 2 times per day Sid Jensen MD   10 mL at 12/31/22 0831    sodium chloride flush 0.9 % injection 5-40 mL  5-40 mL IntraVENous PRN Sid Jensen MD        0.9 % sodium chloride infusion   IntraVENous PRN Sid Jensen MD        ondansetron (ZOFRAN-ODT) disintegrating tablet 4 mg  4 mg Oral Q8H PRN Sid Jensen MD        Or    ondansetron [facility]) injection 4 mg  4 mg IntraVENous Q6H PRDORIAN Jensen MD        polyethylene glycol [facility]) packet 17 g  17 g Oral Daily PRN Brittney Range, MD        oseltamivir (TAMIFLU) capsule 75 mg  75 mg Oral BID Brittney Range, MD   75 mg at 12/31/22 0830    amiodarone (CORDARONE) tablet 200 mg  200 mg Oral Daily Brittney Range, MD   200 mg at 12/31/22 0830    apixaban (ELIQUIS) tablet 5 mg  5 mg Oral Q12H Brittney Range, MD   5 mg at 12/31/22 0830    [Held by provider] carvedilol (COREG) tablet 25 mg  25 mg Oral BID  Brittney Range, MD        clopidogrel (PLAVIX) tablet 75 mg  75 mg Oral Daily Brittney Range, MD   75 mg at 12/31/22 0830    mometasone-formoterol (DULERA) 200-5 MCG/ACT inhaler 2 puff  2 puff Inhalation BID Brittney Range, MD   2 puff at 12/31/22 0718    latanoprost (XALATAN) 0.005 % ophthalmic solution 1 drop  1 drop Both Eyes Nightly Brittney Range, MD   1 drop at 12/30/22 2106    magnesium oxide (MAG-OX) tablet 400 mg  400 mg Oral Daily Brittney Range, MD   400 mg at 12/31/22 0830    montelukast (SINGULAIR) tablet 10 mg  10 mg Oral Nightly Brittney Range, MD   10 mg at 12/30/22 2105    rosuvastatin (CRESTOR) tablet 10 mg  10 mg Oral Nightly Brittney Range, MD   10 mg at 12/30/22 2105    [Held by provider] valsartan (DIOVAN) tablet 80 mg  80 mg Oral Daily Brittney Range, MD        glucose chewable tablet 16 g  4 tablet Oral PRN Brittney Range, MD        dextrose bolus 10% 125 mL  125 mL IntraVENous PRN Brittney Range, MD        Or    dextrose bolus 10% 250 mL  250 mL IntraVENous PRN Brittney Range, MD        glucagon (rDNA) injection 1 mg  1 mg SubCUTAneous PRN Brittney Range, MD        dextrose 10 % infusion   IntraVENous Continuous PRN Brittney Jay Jay, MD        insulin lispro (HUMALOG) injection vial 0-8 Units  0-8 Units SubCUTAneous TID  Brittney Jay Jay, MD   6 Units at 12/31/22 1148    insulin lispro (HUMALOG) injection vial 0-4 Units  0-4 Units SubCUTAneous Nightly Brittney Jay Jay, MD   4 Units at 12/30/22 2103 methylPREDNISolone sodium (SOLU-MEDROL) injection 40 mg  40 mg IntraVENous Q8H Ryan Herman MD   40 mg at 12/31/22 0831    albuterol (PROVENTIL) nebulizer solution 2.5 mg  2.5 mg Nebulization 4x daily Ryan Herman MD   2.5 mg at 12/31/22 1109    albuterol (PROVENTIL) nebulizer solution 2.5 mg  2.5 mg Nebulization Q6H PRN Ryan Herman MD        tiotropium (SPIRIVA RESPIMAT) 2.5 MCG/ACT inhaler 2 puff  2 puff Inhalation Daily Ryan Herman MD   2 puff at 12/31/22 3500    potassium chloride (KLOR-CON M) extended release tablet 40 mEq  40 mEq Oral Once Ryan Herman MD        furosemide (LASIX) tablet 40 mg  40 mg Oral Daily Ryan Herman MD   40 mg at 12/31/22 [facility] problems     Patient Active Problem List   Diagnosis    H/O endarterectomy    Arterial degeneration    Atrial fibrillation (HCC)    Complicated wound infection    Allergic rhinitis    Elevated troponin    HTN (hypertension)    Pulmonary emphysema ([address]    Atherosclerosis of native arteries of extremity with intermittent claudication (HCC)    Severe obesity (BMI 35.0-39. 9) with comorbidity ([address]    COPD (chronic obstructive pulmonary disease) (HCC)    Personal history of tobacco use, presenting hazards to health    Intermittent spinal claudication (HCC)    Cigarette nicotine dependence in remission    Irregular heart beat    Acute metabolic encephalopathy    Chest pain    Hiatal hernia    Diabetic neuropathy (HCC)    Normocytic anemia    Chronic pain of right knee    Sleep apnea    Osteoarthritis    Encounter for long-term (current) drug use    Ischemic cardiomyopathy    Ventricular tachycardia    VT (ventricular tachycardia)    Coronary artery disease involving native coronary artery of native heart without angina pectoris    Benign neoplasm of colon    PAD (peripheral artery disease) (HCC)    Asthma    Orthostatic hypotension    Hyperlipidemia    S/P angioplasty with stent    DM (diabetes mellitus) type II, controlled, with peripheral vascular disorder ([address]    Toe laceration    Obstructive sleep apnea    MINI (acute kidney injury) ([address]    Type 2 diabetes with nephropathy (HCC)    Hypoxia    Abnormal nuclear cardiac imaging test    PVC's (premature ventricular contractions)    Asbestos exposure    Sepsis ([address]    ICD (implantable cardioverter-defibrillator) in place    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, vertebra(e), init (HCC)    General weakness    Acute cystitis without hematuria    Low back pain without sciatica    Back pain    DNI (do not intubate)    Elevated liver enzymes    Anemia    Hyponatremia    Physical debility    Lumbar discitis    Psoas muscle abscess (HCC)    Non-pressure chronic ulcer of left heel and midfoot with fat layer exposed ([address]    Antiplatelet or antithrombotic long-term use    COPD exacerbation ([address]    Influenza          Signed By: Rcahel Ordonez MD     December 31, 2022

## 2022-12-31 NOTE — PROGRESS NOTES
Received report from [facility]. Patient awake and in bed. A&O x4. Respirations present. On 2L NC. No signs of distress. No needs expressed. Bed low and locked. Call light within reach. Will continue to monitor.

## 2023-01-01 LAB
ANION GAP SERPL CALC-SCNC: 4 MMOL/L (ref 2–11)
BASOPHILS # BLD: 0 K/UL (ref 0–0.2)
BASOPHILS NFR BLD: 0 % (ref 0–2)
BUN SERPL-MCNC: 20 MG/DL (ref 8–23)
CALCIUM SERPL-MCNC: 8.6 MG/DL (ref 8.3–10.4)
CHLORIDE SERPL-SCNC: 101 MMOL/L (ref 101–110)
CO2 SERPL-SCNC: 33 MMOL/L (ref 21–32)
CREAT SERPL-MCNC: 0.9 MG/DL (ref 0.8–1.5)
DIFFERENTIAL METHOD BLD: ABNORMAL
EOSINOPHIL # BLD: 0 K/UL (ref 0–0.8)
EOSINOPHIL NFR BLD: 0 % (ref 0.5–7.8)
ERYTHROCYTE [DISTWIDTH] IN BLOOD BY AUTOMATED COUNT: 15.4 % (ref 11.9–14.6)
GLUCOSE BLD STRIP.AUTO-MCNC: 235 MG/DL (ref 65–100)
GLUCOSE BLD STRIP.AUTO-MCNC: 280 MG/DL (ref 65–100)
GLUCOSE BLD STRIP.AUTO-MCNC: 322 MG/DL (ref 65–100)
GLUCOSE BLD STRIP.AUTO-MCNC: 342 MG/DL (ref 65–100)
GLUCOSE SERPL-MCNC: 243 MG/DL (ref 65–100)
HCT VFR BLD AUTO: 30.6 % (ref 41.1–50.3)
HGB BLD-MCNC: 10 G/DL (ref 13.6–17.2)
IMM GRANULOCYTES # BLD AUTO: 0.1 K/UL (ref 0–0.5)
IMM GRANULOCYTES NFR BLD AUTO: 1 % (ref 0–5)
LYMPHOCYTES # BLD: 0.7 K/UL (ref 0.5–4.6)
LYMPHOCYTES NFR BLD: 13 % (ref 13–44)
MCH RBC QN AUTO: 29.9 PG (ref 26.1–32.9)
MCHC RBC AUTO-ENTMCNC: 32.7 G/DL (ref 31.4–35)
MCV RBC AUTO: 91.6 FL (ref 82–102)
MONOCYTES # BLD: 0.4 K/UL (ref 0.1–1.3)
MONOCYTES NFR BLD: 7 % (ref 4–12)
NEUTS SEG # BLD: 4.2 K/UL (ref 1.7–8.2)
NEUTS SEG NFR BLD: 79 % (ref 43–78)
NRBC # BLD: 0 K/UL (ref 0–0.2)
PLATELET # BLD AUTO: 215 K/UL (ref 150–450)
PLATELET COMMENT: ADEQUATE
PMV BLD AUTO: 11.2 FL (ref 9.4–12.3)
POTASSIUM SERPL-SCNC: 3.6 MMOL/L (ref 3.5–5.1)
RBC # BLD AUTO: 3.34 M/UL (ref 4.23–5.6)
RBC MORPH BLD: ABNORMAL
RBC MORPH BLD: ABNORMAL
SERVICE CMNT-IMP: ABNORMAL
SODIUM SERPL-SCNC: 138 MMOL/L (ref 133–143)
WBC # BLD AUTO: 5.4 K/UL (ref 4.3–11.1)
WBC MORPH BLD: ABNORMAL

## 2023-01-01 PROCEDURE — 6370000000 HC RX 637 (ALT 250 FOR IP): Performed by: INTERNAL MEDICINE

## 2023-01-01 PROCEDURE — 6360000002 HC RX W HCPCS: Performed by: INTERNAL MEDICINE

## 2023-01-01 PROCEDURE — 2700000000 HC OXYGEN THERAPY PER DAY

## 2023-01-01 PROCEDURE — 2580000003 HC RX 258: Performed by: INTERNAL MEDICINE

## 2023-01-01 PROCEDURE — 94760 N-INVAS EAR/PLS OXIMETRY 1: CPT

## 2023-01-01 PROCEDURE — 1100000000 HC RM PRIVATE

## 2023-01-01 PROCEDURE — 36415 COLL VENOUS BLD VENIPUNCTURE: CPT

## 2023-01-01 PROCEDURE — 85025 COMPLETE CBC W/AUTO DIFF WBC: CPT

## 2023-01-01 PROCEDURE — 82962 GLUCOSE BLOOD TEST: CPT

## 2023-01-01 PROCEDURE — 80048 BASIC METABOLIC PNL TOTAL CA: CPT

## 2023-01-01 PROCEDURE — 94640 AIRWAY INHALATION TREATMENT: CPT

## 2023-01-01 RX ORDER — METHYLPREDNISOLONE SODIUM SUCCINATE 40 MG/ML
40 INJECTION, POWDER, LYOPHILIZED, FOR SOLUTION INTRAMUSCULAR; INTRAVENOUS EVERY 12 HOURS
Status: DISCONTINUED | OUTPATIENT
Start: 2023-01-01 | End: 2023-01-02

## 2023-01-01 RX ADMIN — AMIODARONE HYDROCHLORIDE 200 MG: 200 TABLET ORAL at 08:40

## 2023-01-01 RX ADMIN — METHYLPREDNISOLONE SODIUM SUCCINATE 40 MG: 125 INJECTION, POWDER, FOR SOLUTION INTRAMUSCULAR; INTRAVENOUS at 08:55

## 2023-01-01 RX ADMIN — INSULIN LISPRO 6 UNITS: 100 INJECTION, SOLUTION INTRAVENOUS; SUBCUTANEOUS at 16:55

## 2023-01-01 RX ADMIN — LATANOPROST 1 DROP: 50 SOLUTION OPHTHALMIC at 21:43

## 2023-01-01 RX ADMIN — MAGNESIUM GLUCONATE 500 MG ORAL TABLET 400 MG: 500 TABLET ORAL at 08:40

## 2023-01-01 RX ADMIN — METHYLPREDNISOLONE SODIUM SUCCINATE 40 MG: 40 INJECTION, POWDER, FOR SOLUTION INTRAMUSCULAR; INTRAVENOUS at 21:35

## 2023-01-01 RX ADMIN — ROSUVASTATIN 10 MG: 10 TABLET, FILM COATED ORAL at 21:35

## 2023-01-01 RX ADMIN — METHYLPREDNISOLONE SODIUM SUCCINATE 40 MG: 125 INJECTION, POWDER, FOR SOLUTION INTRAMUSCULAR; INTRAVENOUS at 01:42

## 2023-01-01 RX ADMIN — CEFTRIAXONE SODIUM 2000 MG: 2 INJECTION, POWDER, FOR SOLUTION INTRAMUSCULAR; INTRAVENOUS at 13:06

## 2023-01-01 RX ADMIN — FUROSEMIDE 40 MG: 40 TABLET ORAL at 08:40

## 2023-01-01 RX ADMIN — MONTELUKAST 10 MG: 10 TABLET, FILM COATED ORAL at 21:35

## 2023-01-01 RX ADMIN — APIXABAN 5 MG: 5 TABLET, FILM COATED ORAL at 08:40

## 2023-01-01 RX ADMIN — APIXABAN 5 MG: 5 TABLET, FILM COATED ORAL at 21:35

## 2023-01-01 RX ADMIN — OSELTAMIVIR PHOSPHATE 75 MG: 75 CAPSULE ORAL at 08:40

## 2023-01-01 RX ADMIN — INSULIN LISPRO 2 UNITS: 100 INJECTION, SOLUTION INTRAVENOUS; SUBCUTANEOUS at 08:41

## 2023-01-01 RX ADMIN — OSELTAMIVIR PHOSPHATE 75 MG: 75 CAPSULE ORAL at 21:35

## 2023-01-01 RX ADMIN — CLOPIDOGREL BISULFATE 75 MG: 75 TABLET ORAL at 08:40

## 2023-01-01 RX ADMIN — SODIUM CHLORIDE, PRESERVATIVE FREE 5 ML: 5 INJECTION INTRAVENOUS at 08:41

## 2023-01-01 RX ADMIN — SODIUM CHLORIDE, PRESERVATIVE FREE 10 ML: 5 INJECTION INTRAVENOUS at 21:35

## 2023-01-01 RX ADMIN — TIOTROPIUM BROMIDE INHALATION SPRAY 2 PUFF: 3.12 SPRAY, METERED RESPIRATORY (INHALATION) at 07:35

## 2023-01-01 RX ADMIN — INSULIN LISPRO 6 UNITS: 100 INJECTION, SOLUTION INTRAVENOUS; SUBCUTANEOUS at 12:33

## 2023-01-01 NOTE — PROGRESS NOTES
Patient is asleep in the bed. Patient is on his CPAP with no s/s. of distress. Call bell is within reach. Will continue to monitor.

## 2023-01-01 NOTE — PROGRESS NOTES
Received report from [facility]. Patient is resting comfortably in the bed. Patient is alert and oriented times 4. Patient is on Cpap currently. No s/s of distress. No needs expressed at this time. Call bell is within reach and patient is instructed to call for assistance.  Will continue to monitor

## 2023-01-01 NOTE — PROGRESS NOTES
Hospitalist Progress Note   Admit Date:  2022 10:02 AM   Name:  Yunior Heard   Age:  65 y.o. Sex:  male  :  1945   MRN:  [id number]   Room:  821/01    Presenting Complaint: Shortness of Breath     Reason(s) for Admission: Hypoxia [R09.02]  COPD exacerbation ([facility] [J44.1]  Congestive heart failure, unspecified HF chronicity, unspecified heart failure type [facility]) [I50.9]     Hospital Course:   Mr. Jong Alas is a nice 64 y/o WM with a h/o ischemic CM, chronic sCHF, SHERI, PAD, COPD, HTN, DM2, HLD and atrial fib who was admitted to our service on  with acute hypoxemic respiratory failure likely due to acute influenza with suspected COPDe. He was started on supplemental O2, steroids, Tamiflu and nebulizers. Blood cultures grew alpha strep and CoNS. He is on ceftriaxone. Weaned off O2. Subjective & 24hr Events (23): On RA now, feels well, no SOB or fever overnight. Breathing improved. No chest pain. Assessment & Plan:   # Bacteremia due to alpha streptococcus, CoNS   - Coag neg staph is likely a contaminant, only 1 bottle   - Strep is in both bottles of 1 set   - Rocephin was started on . Repeat blood cultures are negative to date. No consolidation on CXR. Awaiting final ID/sens of strep. Head CT mentioned suspected mastoiditis but he has no erythema of that area on exam or any other complaints. Will get ID to see tomorrow for abx recommendations assuming strep not ultimately felt to be a contaminant. # Acute hypoxemic respiratory failure 2/2 influenza   - CXR w/o consolidation. On RA as of . Tamiflu EOT . Symptom mgmt otherwise. # HypoK   - Resolved. # CAD // chronic sCHF   - Euvolemic. Coreg on hold. Con't Plavix,     # Atrial fibrillation   - BB as above. Con't Eliquis, amio. # DM2   - Hold orals. Con't SSI. Anticipated discharge needs: Pending. Diet:  ADULT DIET;  Regular; 4 carb choices (60 gm/meal)  DVT PPx: Eliquis  Code status: Full Code    Hospital Problems:  Principal Problem:    COPD exacerbation ([facility]  Active Problems:    ICD (implantable cardioverter-defibrillator) in place    Influenza    Atrial fibrillation ([facility]    COPD (chronic obstructive pulmonary disease) (HCC)    DM (diabetes mellitus) type II, controlled, with peripheral vascular disorder ([facility]  Resolved Problems:    * No resolved hospital problems. *      Objective:   Patient Vitals for the past 24 hrs:   Temp Pulse Resp BP SpO2   01/01/23 1125 97.7 °F (36.5 °C) 59 19 133/73 90 %   01/01/23 0816 97.9 °F (36.6 °C) 63 19 (!) 166/81 94 %   01/01/23 0736 -- 51 17 -- 92 %   01/01/23 0330 -- 66 -- -- --   01/01/23 0254 97.2 °F (36.2 °C) (!) 105 18 (!) 164/83 94 %   01/01/23 0123 -- 69 -- -- --   12/31/22 2317 -- 59 -- 136/75 --   12/31/22 2316 98.4 °F (36.9 °C) 60 18 (!) 165/75 92 %   12/31/22 1938 97.9 °F (36.6 °C) 59 18 125/70 91 %   12/31/22 1519 98.2 °F (36.8 °C) 66 16 138/78 92 %       Oxygen Therapy  SpO2: 90 %  Pulse via Oximetry: 75 beats per minute  Pulse Oximeter Device Mode: Intermittent  O2 Device: None (Room air)  O2 Flow Rate (L/min): 2 L/min    Estimated body mass index is 31.44 kg/m² as calculated from the following:    Height as of this encounter: 6' 1\" (1.854 m). Weight as of this encounter: 238 lb 5.1 oz (108.1 kg). Intake/Output Summary (Last 24 hours) at 1/1/2023 1328  Last data filed at 1/1/2023 0514  Gross per 24 hour   Intake --   Output 2700 ml   Net -2700 ml         Physical Exam:   Blood pressure 133/73, pulse 59, temperature 97.7 °F (36.5 °C), temperature source Oral, resp. rate 19, height 6' 1\" (1.854 m), weight 238 lb 5.1 oz (108.1 kg), SpO2 90 %. General:    Well nourished. Pleasant, in bed, conversant. Head:  Normocephalic, atraumatic  Eyes:  Sclerae appear normal.  Pupils equally round. ENT:  Nares appear normal.  Moist oral mucosa. Mastoids bilaterally appear normal without erythema, edema or tenderness to palpation. Neck:  No restricted ROM. Trachea midline   CV:   RRR. No m/r/g. No jugular venous distension. Lungs:   Mild rales L base, clear otherwise w/o wheezes or rhonchi. On RA O2, normal effort. Abdomen:   Soft, nontender, nondistended. Extremities: No cyanosis or clubbing. No edema  Skin:     No rashes and normal coloration. Warm and dry. Neuro:  CN II-XII grossly intact. Sensation intact. Psych:  Normal mood and affect.       I have personally reviewed labs and tests showing:  Recent Labs:  Recent Results (from the past 48 hour(s))   Transthoracic echocardiogram (TTE) complete with contrast, bubble, strain, and 3D PRN    Collection Time: 12/30/22  3:30 PM   Result Value Ref Range    LV EDV A2C 139 mL    LV EDV A4C 194 mL    LV ESV A2C 76 mL    LV ESV A4C 93 mL    IVSd 1.1 (A) 0.6 - 1.0 cm    LVIDd 6.0 (A) 4.2 - 5.9 cm    LVIDs 4.5 cm    LVOT Diameter 2.4 cm    LVOT Mean Gradient 2 mmHg    LVOT VTI 20.3 cm    LVOT Peak Velocity 1.0 m/s    LVOT Peak Gradient 4 mmHg    LVPWd 1.1 (A) 0.6 - 1.0 cm    LV E' Lateral Velocity 10 cm/s    LV E' Septal Velocity 7 cm/s    LV Ejection Fraction A2C 45 %    LV Ejection Fraction A4C 52 %    EF BP 49 (A) 55 - 100 %    LVOT Area 4.5 cm2    LVOT SV 91.8 ml    LA Minor Axis 6.1 cm    LA Major [place] 6.3 cm    LA Area 2C 25.1 cm2    LA Area 4C 24.4 cm2    LA Volume 2C 83 (A) 18 - 58 mL    LA Volume 4C 80 (A) 18 - 58 mL    LA Volume BP 82 (A) 18 - 58 mL    LA Diameter 4.6 cm    AV Mean Velocity 1.1 m/s    AV Mean Gradient 6 mmHg    AV VTI 36.4 cm    AV Peak Velocity 1.7 m/s    AV Peak Gradient 12 mmHg    AV Area by VTI 2.5 cm2    AV Area by Peak Velocity 2.5 cm2    Aortic Root 3.5 cm    Ascending Aorta 3.5 cm    IVC Proxmal 2.7 cm    MV E Wave Deceleration Time 218.0 ms    MV A Velocity 1.14 m/s    MV E Velocity 1.07 m/s    MV Mean Gradient 2 mmHg    MV VTI 41.8 cm    MV Mean Velocity 0.7 m/s    MV Max Velocity 1.2 m/s    MV Peak Gradient 6 mmHg    MV Area by VTI 2.2 cm2    RV Basal Dimension 4.8 cm    RV Mid Dimension 3.3 cm    RV Free Wall Peak S' 18 cm/s    TAPSE 3.0 1.7 cm    Body Surface Area 2.29 m2    Fractional Shortening 2D 25 28 - 44 %    LV ESV Index A4C 41 mL/m2    LV EDV Index A4C 86 mL/m2    LV ESV Index A2C 34 mL/m2    LV EDV Index A2C 62 mL/m2    LVIDd Index 2.65 cm/m2    LVIDs Index 1.99 cm/m2    LV RWT Ratio 0.37     LV Mass 2D 279.6 (A) 88 - 224 g    LV Mass 2D Index 123.7 (A) 49 - 115 g/m2    MV E/A 0.94     E/E' Ratio (Averaged) 12.99     E/E' Lateral 10.70     E/E' Septal 15.29     LA Volume Index BP 36 (A) 16 - 34 ml/m2    LVOT Stroke Volume Index 40.6 mL/m2    LA Volume Index 2C 37 (A) 16 - 34 mL/m2    LA Volume Index 4C 35 (A) 16 - 34 mL/m2    LA Size Index 2.04 cm/m2    LA/AO Root Ratio 1.31     Ao Root Index 1.55 cm/m2    Ascending Aorta Index 1.55 cm/m2    AV Velocity Ratio 0.59     LVOT:AV VTI Index 0.56     CORI/BSA VTI 1.1 cm2/m2    CORI/BSA Peak Velocity 1.1 cm2/m2    MV:LVOT VTI Index 2.06    Culture, Blood 1    Collection Time: 12/30/22  4:03 PM    Specimen: Blood   Result Value Ref Range    Special Requests RIGHT  HAND        Culture NO GROWTH 2 DAYS     POCT Glucose    Collection Time: 12/30/22  4:34 PM   Result Value Ref Range    POC Glucose 266 (H) 65 - 100 mg/dL    Performed by: Mone    POCT Glucose    Collection Time: 12/30/22  8:23 PM   Result Value Ref Range    POC Glucose 305 (H) 65 - 100 mg/dL    Performed by: KelliGURINDER    CBC with Auto Differential    Collection Time: 12/31/22  3:47 AM   Result Value Ref Range    WBC 4.7 4.3 - 11.1 K/uL    RBC 3.23 (L) 4.23 - 5.6 M/uL    Hemoglobin 9.9 (L) 13.6 - 17.2 g/dL    Hematocrit 29.9 (L) 41.1 - 50.3 %    MCV 92.6 82 - 102 FL    MCH 30.7 26.1 - 32.9 PG    MCHC 33.1 31.4 - 35.0 g/dL    RDW 15.5 (H) 11.9 - 14.6 %    Platelets 266 989 - 064 K/uL    MPV 11.1 9.4 - 12.3 FL    nRBC 0.00 0.0 - 0.2 K/uL    Differential Type AUTOMATED      Seg Neutrophils 79 (H) 43 - 78 %    Lymphocytes 10 (L) 13 - 44 %    Monocytes 10 4.0 - 12.0 %    Eosinophils % 0 (L) 0.5 - 7.8 %    Basophils 0 0.0 - 2.0 %    Immature Granulocytes 1 0.0 - 5.0 %    Segs Absolute 3.7 1.7 - 8.2 K/UL    Absolute Lymph # 0.5 0.5 - 4.6 K/UL    Absolute Mono # 0.5 0.1 - 1.3 K/UL    Absolute Eos # 0.0 0.0 - 0.8 K/UL    Basophils Absolute 0.0 0.0 - 0.2 K/UL    Absolute Immature Granulocyte 0.1 0.0 - 0.5 K/UL   Basic Metabolic Panel    Collection Time: 12/31/22  3:47 AM   Result Value Ref Range    Sodium 138 133 - 143 mmol/L    Potassium 3.2 (L) 3.5 - 5.1 mmol/L    Chloride 100 (L) 101 - 110 mmol/L    CO2 32 21 - 32 mmol/L    Anion Gap 6 2 - 11 mmol/L    Glucose 245 (H) 65 - 100 mg/dL    BUN 19 8 - 23 MG/DL    Creatinine 1.00 0.8 - 1.5 MG/DL    Est, Glom Filt Rate >60 >60 ml/min/1.73m2    Calcium 8.6 8.3 - 10.4 MG/DL   POCT Glucose    Collection Time: 12/31/22  7:50 AM   Result Value Ref Range    POC Glucose 229 (H) 65 - 100 mg/dL    Performed by: Sulma    POCT Glucose    Collection Time: 12/31/22 11:21 AM   Result Value Ref Range    POC Glucose 342 (H) 65 - 100 mg/dL    Performed by: Sulma    POCT Glucose    Collection Time: 12/31/22  4:12 PM   Result Value Ref Range    POC Glucose 354 (H) 65 - 100 mg/dL    Performed by: Sana    POCT Glucose    Collection Time: 12/31/22  8:33 PM   Result Value Ref Range    POC Glucose 306 (H) 65 - 100 mg/dL    Performed by: Saul    CBC with Auto Differential    Collection Time: 01/01/23  4:22 AM   Result Value Ref Range    WBC 5.4 4.3 - 11.1 K/uL    RBC 3.34 (L) 4.23 - 5.6 M/uL    Hemoglobin 10.0 (L) 13.6 - 17.2 g/dL    Hematocrit 30.6 (L) 41.1 - 50.3 %    MCV 91.6 82 - 102 FL    MCH 29.9 26.1 - 32.9 PG    MCHC 32.7 31.4 - 35.0 g/dL    RDW 15.4 (H) 11.9 - 14.6 %    Platelets 481 524 - 351 K/uL    MPV 11.2 9.4 - 12.3 FL    nRBC 0.00 0.0 - 0.2 K/uL    Seg Neutrophils 79 (H) 43 - 78 %    Lymphocytes 13 13 - 44 %    Monocytes 7 4.0 - 12.0 %    Eosinophils % 0 (L) 0.5 - 7.8 %    Basophils 0 0.0 - 2.0 %    Immature Granulocytes 1 0.0 - 5.0 %    Segs Absolute 4.2 1.7 - 8.2 K/UL    Absolute Lymph # 0.7 0.5 - 4.6 K/UL    Absolute Mono # 0.4 0.1 - 1.3 K/UL    Absolute Eos # 0.0 0.0 - 0.8 K/UL    Basophils Absolute 0.0 0.0 - 0.2 K/UL    Absolute Immature Granulocyte 0.1 0.0 - 0.5 K/UL    RBC Comment SLIGHT  ANISOCYTOSIS + POIKILOCYTOSIS        RBC Comment OCCASIONAL  OVALOCYTES        WBC Comment Result Confirmed By Smear      Platelet Comment ADEQUATE      Differential Type AUTOMATED     Basic Metabolic Panel    Collection Time: 01/01/23  4:22 AM   Result Value Ref Range    Sodium 138 133 - 143 mmol/L    Potassium 3.6 3.5 - 5.1 mmol/L    Chloride 101 101 - 110 mmol/L    CO2 33 (H) 21 - 32 mmol/L    Anion Gap 4 2 - 11 mmol/L    Glucose 243 (H) 65 - 100 mg/dL    BUN 20 8 - 23 MG/DL    Creatinine 0.90 0.8 - 1.5 MG/DL    Est, Glom Filt Rate >60 >60 ml/min/1.73m2    Calcium 8.6 8.3 - 10.4 MG/DL   POCT Glucose    Collection Time: 01/01/23  8:17 AM   Result Value Ref Range    POC Glucose 235 (H) 65 - 100 mg/dL    Performed by: Synchrony    POCT Glucose    Collection Time: 01/01/23 11:27 AM   Result Value Ref Range    POC Glucose 322 (H) 65 - 100 mg/dL    Performed by: Synchrony        I have personally reviewed imaging studies showing: Other Studies:  CT HEAD WO CONTRAST   Final Result   1. No acute intracranial process evident by noncontrast CT study of the head. 2.  Moderate right mastoid effusion with fluid entering the right middle ear. This can been indicator for right otomastoiditis. Recommend clinical   correlation. This report was made using voice transcription. Despite my best efforts to avoid   any, transcription errors may persist. If there is any question about the   accuracy of the report or need for clarification, then please call [phone], or text me through [email or web address] for clarification or correction. XR CHEST PORTABLE   Final Result   1.   Only mild cardiomegaly seen which does appear improved from the prior study. Some component of early or mild heart failure is difficult to exclude given the   patient's acute symptoms, however. No potential acute process is otherwise   suggested.           Current Meds:  Current Facility-Administered Medications   Medication Dose Route Frequency    methylPREDNISolone sodium (SOLU-MEDROL) injection 40 mg  40 mg IntraVENous Q12H    cefTRIAXone (ROCEPHIN) 2,000 mg in sodium chloride 0.9 % 50 mL IVPB mini-bag  2,000 mg IntraVENous Q24H    sodium chloride flush 0.9 % injection 5-40 mL  5-40 mL IntraVENous 2 times per day    sodium chloride flush 0.9 % injection 5-40 mL  5-40 mL IntraVENous PRN    0.9 % sodium chloride infusion   IntraVENous PRN    ondansetron (ZOFRAN-ODT) disintegrating tablet 4 mg  4 mg Oral Q8H PRN    Or    ondansetron (ZOFRAN) injection 4 mg  4 mg IntraVENous Q6H PRN    polyethylene glycol (GLYCOLAX) packet 17 g  17 g Oral Daily PRN    oseltamivir (TAMIFLU) capsule 75 mg  75 mg Oral BID    amiodarone (CORDARONE) tablet 200 mg  200 mg Oral Daily    apixaban (ELIQUIS) tablet 5 mg  5 mg Oral Q12H    [Held by provider] carvedilol (COREG) tablet 25 mg  25 mg Oral BID WC    clopidogrel (PLAVIX) tablet 75 mg  75 mg Oral Daily    latanoprost (XALATAN) 0.005 % ophthalmic solution 1 drop  1 drop Both Eyes Nightly    magnesium oxide (MAG-OX) tablet 400 mg  400 mg Oral Daily    montelukast (SINGULAIR) tablet 10 mg  10 mg Oral Nightly    rosuvastatin (CRESTOR) tablet 10 mg  10 mg Oral Nightly    [Held by provider] valsartan (DIOVAN) tablet 80 mg  80 mg Oral Daily    glucose chewable tablet 16 g  4 tablet Oral PRN    dextrose bolus 10% 125 mL  125 mL IntraVENous PRN    Or    dextrose bolus 10% 250 mL  250 mL IntraVENous PRN    glucagon (rDNA) injection 1 mg  1 mg SubCUTAneous PRN    dextrose 10 % infusion   IntraVENous Continuous PRN    insulin lispro (HUMALOG) injection vial 0-8 Units  0-8 Units SubCUTAneous TID  insulin lispro (HUMALOG) injection vial 0-4 Units  0-4 Units SubCUTAneous Nightly    albuterol (PROVENTIL) nebulizer solution 2.5 mg  2.5 mg Nebulization Q6H PRN    tiotropium (SPIRIVA RESPIMAT) 2.5 MCG/ACT inhaler 2 puff  2 puff Inhalation Daily    potassium chloride (KLOR-CON M) extended release tablet 40 mEq  40 mEq Oral Once    furosemide (LASIX) tablet 40 mg  40 mg Oral Daily       Signed:  Bradley Torres MD    Part of this note may have been written by using a voice dictation software. The note has been proof read but may still contain some grammatical/other typographical errors.

## 2023-01-01 NOTE — PROGRESS NOTES
Patient is asleep in the bed, resting comfortably. Patient is still on his CPAP. No s/s of distress. No events overnight. Call bell is within reach. Preparing to give bedside report.

## 2023-01-02 LAB
BACTERIA SPEC CULT: ABNORMAL
BACTERIA SPEC CULT: ABNORMAL
GLUCOSE BLD STRIP.AUTO-MCNC: 314 MG/DL (ref 65–100)
GLUCOSE BLD STRIP.AUTO-MCNC: 320 MG/DL (ref 65–100)
GLUCOSE BLD STRIP.AUTO-MCNC: 327 MG/DL (ref 65–100)
GLUCOSE BLD STRIP.AUTO-MCNC: 335 MG/DL (ref 65–100)
GRAM STN SPEC: ABNORMAL
SERVICE CMNT-IMP: ABNORMAL

## 2023-01-02 PROCEDURE — 6360000002 HC RX W HCPCS: Performed by: INTERNAL MEDICINE

## 2023-01-02 PROCEDURE — 82962 GLUCOSE BLOOD TEST: CPT

## 2023-01-02 PROCEDURE — 2580000003 HC RX 258: Performed by: INTERNAL MEDICINE

## 2023-01-02 PROCEDURE — 94640 AIRWAY INHALATION TREATMENT: CPT

## 2023-01-02 PROCEDURE — 97166 OT EVAL MOD COMPLEX 45 MIN: CPT

## 2023-01-02 PROCEDURE — 97161 PT EVAL LOW COMPLEX 20 MIN: CPT

## 2023-01-02 PROCEDURE — 2500000003 HC RX 250 WO HCPCS: Performed by: INTERNAL MEDICINE

## 2023-01-02 PROCEDURE — 87040 BLOOD CULTURE FOR BACTERIA: CPT

## 2023-01-02 PROCEDURE — 1100000000 HC RM PRIVATE

## 2023-01-02 PROCEDURE — 36415 COLL VENOUS BLD VENIPUNCTURE: CPT

## 2023-01-02 PROCEDURE — 6370000000 HC RX 637 (ALT 250 FOR IP): Performed by: INTERNAL MEDICINE

## 2023-01-02 PROCEDURE — 97530 THERAPEUTIC ACTIVITIES: CPT

## 2023-01-02 PROCEDURE — 97535 SELF CARE MNGMENT TRAINING: CPT

## 2023-01-02 RX ORDER — PREDNISONE 20 MG/1
40 TABLET ORAL DAILY
Status: DISCONTINUED | OUTPATIENT
Start: 2023-01-02 | End: 2023-01-04

## 2023-01-02 RX ADMIN — INSULIN LISPRO 6 UNITS: 100 INJECTION, SOLUTION INTRAVENOUS; SUBCUTANEOUS at 12:32

## 2023-01-02 RX ADMIN — CARVEDILOL 25 MG: 25 TABLET, FILM COATED ORAL at 08:18

## 2023-01-02 RX ADMIN — APIXABAN 5 MG: 5 TABLET, FILM COATED ORAL at 08:18

## 2023-01-02 RX ADMIN — OSELTAMIVIR PHOSPHATE 75 MG: 75 CAPSULE ORAL at 21:40

## 2023-01-02 RX ADMIN — SODIUM CHLORIDE, PRESERVATIVE FREE 5 ML: 5 INJECTION INTRAVENOUS at 08:19

## 2023-01-02 RX ADMIN — MONTELUKAST 10 MG: 10 TABLET, FILM COATED ORAL at 21:40

## 2023-01-02 RX ADMIN — AMIODARONE HYDROCHLORIDE 200 MG: 200 TABLET ORAL at 08:18

## 2023-01-02 RX ADMIN — VANCOMYCIN HYDROCHLORIDE 1000 MG: 1 INJECTION, POWDER, LYOPHILIZED, FOR SOLUTION INTRAVENOUS at 12:33

## 2023-01-02 RX ADMIN — INSULIN LISPRO 6 UNITS: 100 INJECTION, SOLUTION INTRAVENOUS; SUBCUTANEOUS at 08:16

## 2023-01-02 RX ADMIN — CEFTRIAXONE SODIUM 2000 MG: 2 INJECTION, POWDER, FOR SOLUTION INTRAMUSCULAR; INTRAVENOUS at 11:02

## 2023-01-02 RX ADMIN — OSELTAMIVIR PHOSPHATE 75 MG: 75 CAPSULE ORAL at 08:18

## 2023-01-02 RX ADMIN — CARVEDILOL 25 MG: 25 TABLET, FILM COATED ORAL at 17:15

## 2023-01-02 RX ADMIN — CLOPIDOGREL BISULFATE 75 MG: 75 TABLET ORAL at 08:18

## 2023-01-02 RX ADMIN — MAGNESIUM GLUCONATE 500 MG ORAL TABLET 400 MG: 500 TABLET ORAL at 08:24

## 2023-01-02 RX ADMIN — SODIUM CHLORIDE, PRESERVATIVE FREE 5 ML: 5 INJECTION INTRAVENOUS at 21:41

## 2023-01-02 RX ADMIN — ROSUVASTATIN 10 MG: 10 TABLET, FILM COATED ORAL at 21:40

## 2023-01-02 RX ADMIN — PREDNISONE 40 MG: 20 TABLET ORAL at 08:18

## 2023-01-02 RX ADMIN — INSULIN LISPRO 6 UNITS: 100 INJECTION, SOLUTION INTRAVENOUS; SUBCUTANEOUS at 17:14

## 2023-01-02 RX ADMIN — VANCOMYCIN HYDROCHLORIDE 1000 MG: 1 INJECTION, POWDER, LYOPHILIZED, FOR SOLUTION INTRAVENOUS at 23:59

## 2023-01-02 RX ADMIN — TUBERCULIN PURIFIED PROTEIN DERIVATIVE 5 UNITS: 5 INJECTION, SOLUTION INTRADERMAL at 11:02

## 2023-01-02 RX ADMIN — TIOTROPIUM BROMIDE INHALATION SPRAY 2 PUFF: 3.12 SPRAY, METERED RESPIRATORY (INHALATION) at 07:58

## 2023-01-02 RX ADMIN — INSULIN LISPRO 4 UNITS: 100 INJECTION, SOLUTION INTRAVENOUS; SUBCUTANEOUS at 21:46

## 2023-01-02 RX ADMIN — LATANOPROST 1 DROP: 50 SOLUTION OPHTHALMIC at 21:48

## 2023-01-02 RX ADMIN — FUROSEMIDE 40 MG: 40 TABLET ORAL at 08:18

## 2023-01-02 RX ADMIN — APIXABAN 5 MG: 5 TABLET, FILM COATED ORAL at 21:40

## 2023-01-02 NOTE — PROGRESS NOTES
VANCO DAILY FOLLOW UP NOTE  [address] [facility] Pharmacokinetic Monitoring Service - Vancomycin    Consulting Provider: Jaz Meier   Indication: ICD infection  Target Concentration: Goal AUC/GIOVANNY 400-600 mg*hr/L  Day of Therapy: 1  Additional Antimicrobials: ceftriaxone     Patient eligible for piperacillin-tazobactam to cefepime auto-substitution per P&T approved protocol? NO    Pertinent Laboratory Values: Wt Readings from Last 1 Encounters:   01/02/23 235 lb 10.8 oz (106.9 kg)     Temp Readings from Last 1 Encounters:   01/02/23 98.2 °F (36.8 °C) (Oral)     Recent Labs     12/31/22  0347 01/01/23  0422   BUN 19 20   CREATININE 1.00 0.90   WBC 4.7 5.4     Estimated Creatinine Clearance: 87 mL/min (based on SCr of 0.9 mg/dL). Lab Results   Component Value Date/Time    VANCOTROUGH 12.2 09/07/2022 09:15 AM    VANCORANDOM 16.2 09/01/2022 06:30 AM       MRSA Nasal Swab: N/A.  Non-respiratory infection      Assessment:  Date/Time Dose Concentration AUC         Note: Serum concentrations collected for AUC dosing may appear elevated if collected in close proximity to the dose administered, this is not necessarily an indication of toxicity    Plan:  Dosing recommendations based on Bayesian software  Start vancomycin 1000 mg q 12 hours  Anticipated AUC of 501 and trough concentration of 16.7 at steady state  Renal labs as indicated   Vancomycin concentrations will be ordered as clinically appropriate   Pharmacy will continue to monitor patient and adjust therapy as indicated    Thank you for the consult,  CASTRO Fisher [facility]

## 2023-01-02 NOTE — CONSULTS
Infectious Disease Consult  Today's Date: 1/2/2023   Admit Date: 12/29/2022    Impression:   Strep bacteremia one set of 2 collected 12/29, both bottles in setting ICD. TTE 12/30 not normal: mild TR and calcified posterior MV leaflet with trace MR. July TTE with mild MR but no mention calcified MV leaflet or TR. Single repeat blood cx 12/30 NGTD. CNS bacteremia 1/2 bottles of the other 12/29 set. Could be contaminant but in setting cardiac device, need repeat blood cx to assess. Influenza A: being treated  COPD  Ischemic CMP with ICD  Type 2 DM: well controlled by HgbA1c's    Plan:   Continue CTX at 2 gm daily  I would continue Vanc a few more days to watch below. Follow repeat blood cx 12/30 and obtain 2 more so that have sufficient volume to make accurate assessment. Will ask micro to work up strep species and CNS  Recommend Cardiology consult for DIRK given ICD and his history of discitis earlier in year that was cx negative. Subtle differences in TTE's but not sure this is sufficient to rule out process. Treat flu with 5 days Tamiflu as doing    Anti-infectives:   CTX 12/29-  Vanc x 2.5 gm 12/29  Tamiflu 12/29-    Subjective:   Date of Consultation:  January 2, 2023  Date of Admission: 12/29/2022   Referring Provider: Amber Wynn  Reason for Consult: strep bacteremia    Patient is a 65 y.o. male with a history of COPD, SHERI, Type 2 DM, A-fib, CHF, ischemic CMP with ICD, PVD who was admitted on 12/29/2022 with complaints of worsening SOB associated with cough productive of white sputum. No fever or chills, nausea/emesis. Wife reported some mild confusion. Found to be hypoxic in ED and tested for Influenza A. IN ED, afebrile, normotensive, HR not elevated and RR normal. WBC normal. He does not report other symptoms and is feeling somewhat better. L heel wound is ok. He was last seen by ID on 11/30/22 in the office by Dr. Kristy Burrell. Question raised about if spinal process infection vs other.   Occurred after kyphoplasty in June 2022. Cx negative and treated x 6 weeks with Vanc and CTX. He was off ABX at that time and doing well so decision made to continue off ABX. Patient Active Problem List   Diagnosis    H/O endarterectomy    Arterial degeneration    Atrial fibrillation (HCC)    Complicated wound infection    Allergic rhinitis    Elevated troponin    HTN (hypertension)    Pulmonary emphysema (HCC)    Atherosclerosis of native arteries of extremity with intermittent claudication ([facility])    Severe obesity (BMI 35.0-39. 9) with comorbidity (HCC)    COPD (chronic obstructive pulmonary disease) ([facility])    Personal history of tobacco use, presenting hazards to health    Intermittent spinal claudication ([facility])    Cigarette nicotine dependence in remission    Irregular heart beat    Acute metabolic encephalopathy    Chest pain    Hiatal hernia    Diabetic neuropathy ([facility])    Normocytic anemia    Chronic pain of right knee    Sleep apnea    Osteoarthritis    Encounter for long-term (current) drug use    Ischemic cardiomyopathy    Ventricular tachycardia    VT (ventricular tachycardia)    Coronary artery disease involving native coronary artery of native heart without angina pectoris    Benign neoplasm of colon    PAD (peripheral artery disease) ([facility])    Asthma    Orthostatic hypotension    Hyperlipidemia    S/P angioplasty with stent    DM (diabetes mellitus) type II, controlled, with peripheral vascular disorder (HCC)    Toe laceration    Obstructive sleep apnea    MINI (acute kidney injury) ([address]    Type 2 diabetes with nephropathy ([facility])    Hypoxia    Abnormal nuclear cardiac imaging test    PVC's (premature ventricular contractions)    Asbestos exposure    Sepsis ([address]    ICD (implantable cardioverter-defibrillator) in place    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, vertebra(e), init ([address]    General weakness    Acute cystitis without hematuria    Low back pain without sciatica    Back pain    DNI (do not intubate) Elevated liver enzymes    Anemia    Hyponatremia    Physical debility    Lumbar discitis    Psoas muscle abscess (HCC)    Non-pressure chronic ulcer of left heel and midfoot with fat layer exposed ([address]    Antiplatelet or antithrombotic long-term use    COPD exacerbation (HCC)    Influenza     Past Medical History:   Diagnosis Date    Acute respiratory failure with hypoxia ([address] 10/23/2014    MINI (acute kidney injury) ([address] 09/15/2014    MINI (acute kidney injury) ([address]     MINI (acute kidney injury) ([address]     Allergic rhinitis 11/10/2015    Asthma 11/10/2015    Benign essential hypertension 11/10/2015    Benign neoplasm of colon 11/10/2015    CAD (coronary artery disease) 11/10/2015    CAD (coronary artery disease) 1998, 1999    mix2, 3 stents [id number]    CAP (community acquired pneumonia) 12/31/2020    Cardiomyopathy ([address] 29/08/0266    Complicated wound infection 11/10/2015    COPD (chronic obstructive pulmonary disease) with emphysema ([address] 11/10/2015    COPD exacerbation ([address] 10/12/2011    COVID-19 12/31/2020    Diabetes mellitus type 2, controlled ([address] 11/10/2015    doesn/t check daily oral meds, A1c 6.0 (8/10/22)    Diabetic neuropathy ([address] 11/10/2015    Disruption of wound, unspecified 10/09/2014    Encounter for long-term (current) use of other high-risk medications 11/10/2015    Extremity atherosclerosis with intermittent claudication ([address] 11/10/2015    H/O endarterectomy 11/10/2015    Hiatal hernia 11/10/2015    History of 2019 novel coronavirus disease (COVID-19)     12/31/2020- hospitalized    Hyperglycemia due to type 1 diabetes mellitus ([address] 11/10/2015    Hyperlipidemia 11/10/2015    ICD (implantable cardioverter-defibrillator) in place 06/16/2022    Monomorphic ventricular tachycardia 12/31/2020    Myocardial infarction [facility]) 1999    Myocardial infarction ([address] 11/10/2015    Obesity 11/10/2015    Orthostatic hypotension 11/10/2015    Osteoarthritis 11/10/2015    Peripheral vascular disease ([facility] 11/10/2015    PNA (pneumonia) 2019    PVC (premature ventricular contraction)     Rash     Seroma complicating a procedure 10/02/2014    Sleep apnea     cpap    Toe laceration     Type 2 diabetes with nephropathy ([facility]     Uncontrolled type 2 diabetes mellitus 11/10/2015    Vertiginous syndromes and other disorders of vestibular system 11/10/2015      Family History   Problem Relation Age of Onset    Breast Cancer Mother     Hypertension Mother     Other Father         DIVERTICULITIS    Crohn's Disease Sister     Lung Disease Brother         mesothioloma    Diabetes Sister     Heart Attack Father     Heart Disease Father     Stroke Mother       Social History     Tobacco Use    Smoking status: Former     Packs/day: 1.00     Years: 50.00     Pack years: 50.00     Types: Cigarettes     Quit date: 10/2/2011     Years since quittin.2    Smokeless tobacco: Current     Types: Chew    Tobacco comments:     Chews tobacco daily   Substance Use Topics    Alcohol use: Yes     Alcohol/week: 0.0 standard drinks     Past Surgical History:   Procedure Laterality Date    CARDIAC CATHETERIZATION  2018    LV EF=40%. LM:nl. LAD:30-40% mid stenosis. LCX OM1:95% stenosis. RCA:100% occlusion at RV branch.     CATARACT REMOVAL Right 2022    CORONARY ANGIOPLASTY WITH STENT PLACEMENT  2018    LCX OM1:2.5 x 12 [place]umpass RAKESH    CORONARY ANGIOPLASTY WITH STENT PLACEMENT      MA ABDOMEN SURGERY PROC UNLISTED      abdominal cyst removed    MA CARDIAC SURG PROCEDURE UNLIST      stents x3    SPINE SURGERY N/A 2022    LUMBAR 2, LUMBAR 4 KYPHOPLASTY AND ANY OTHER INDICATED LEVELS performed by Adelina Donato MD at [address] [place] [address]  14    Repair of right common femoral artery     VASCULAR SURGERY  14    tiffanie femoral endarterectomy    VASCULAR SURGERY Left 10/28/2022    LEFT LOWER EXTREMITY ARTERIOGRAM / ULTRASOUND GUIDED ACCESS   performed by Kalani Cortez MD at [facility] MAIN OR      Prior to Admission medications    Medication Sig Start Date End Date Taking? Authorizing Provider   albuterol (PROVENTIL) (2.5 MG/3ML) 0.083% nebulizer solution 1 vial via nebulizer 4 times daily if needed for shortness of breath or wheezing. Diagnosis-COPD, J44.9. Bill to Medicare part B 12/23/22   AARON Kearney CNP   predniSONE 10 MG (21) TBPK Take as directed  Patient not taking: Reported on 12/29/2022 12/22/22   Kelton Dumont MD   carvedilol (COREG) 25 MG tablet Take 1 tablet by mouth 2 times daily (with meals) 12/20/22   Kelton Dumont MD   atropine 1 % ophthalmic solution  11/15/22   Historical Provider, MD infante-docusate (Elsa Medrano) 8.6-50 MG per tablet Take 1 tablet by mouth nightly 8/25/22   Historical Provider, MD   valsartan (DIOVAN) 80 MG tablet Take 1 tablet by mouth daily 11/3/22   AARON Mitchell CNP   CPAP Machine MISC by Does not apply route    Historical Provider, MD   albuterol sulfate HFA (PROVENTIL;VENTOLIN;PROAIR) 108 (90 Base) MCG/ACT inhaler USE 2 PUFFS BY INHALATION 4 TIMES DAILY AS NEEDED FOR WHEEZING OR SHORTNESS OF BREATH. Patient prefers ventolin. 10/27/22   AARON Kearney CNP   GUAIFENESIN 1200 PO Take 1 tablet by mouth every 12 hours as needed    Historical Provider, MD   tamsulosin (FLOMAX) 0.4 MG capsule Take 0.4 mg by mouth daily    Historical Provider, MD   traMADol (ULTRAM) 50 MG tablet Take 50 mg by mouth every 6 hours as needed for Pain. Taking 1/2 every morning    Historical Provider, MD   acetaminophen (TYLENOL) 500 MG tablet Take 500 mg by mouth every 6 hours as needed for Pain Taking 1/2 two times daily    Historical Provider, MD   glipiZIDE (GLUCOTROL XL) 10 MG extended release tablet Take 1 tablet by mouth daily 9/16/22   Lindy Badillo MD   furosemide (LASIX) 20 MG tablet Take 1 tablet by mouth in the morning.   Patient taking differently: Take 20 mg by mouth daily 1/2 tablet 8/9/22   Dilan Woodward MD amLODIPine (NORVASC) 5 MG tablet Take 1 tablet by mouth in the morning. 8/9/22 9/16/22  Carole Holcomb MD   QUEtiapine (SEROQUEL) 25 MG tablet Take 1 tablet by mouth in the morning.  8/9/22 9/16/22  Carole Holcomb MD   rosuvastatin (CRESTOR) 10 MG tablet TAKE 1 TABLET BY MOUTH EVERY DAY  Patient taking differently: at bedtime 7/29/22   Eleazar Luo MD   fluticasone-salmeterol (ADVAIR) 250-50 MCG/ACT AEPB diskus inhaler Inhale 1 puff into the lungs in the morning and at bedtime 6/30/22   Historical Provider, MD   amiodarone (CORDARONE) 200 MG tablet Take 200 mg by mouth daily 3/10/22   Ar Automatic Reconciliation   apixaban (ELIQUIS) 5 MG TABS tablet Take 5 mg by mouth every 12 hours 3/10/22   Ar Automatic Reconciliation   ascorbic acid (VITAMIN C) 500 MG tablet Take 500 mg by mouth    Ar Automatic Reconciliation   Cholecalciferol 50 MCG (2000 UT) TABS Take 2,000 Units by mouth    Ar Automatic Reconciliation   clopidogrel (PLAVIX) 75 MG tablet Take 75 mg by mouth daily 3/11/22   Ar Automatic Reconciliation   fluticasone (FLONASE) 50 MCG/ACT nasal spray USE 1 OR 2 SPRAYS IN EACH NOSTRIL EVERY DAY. 3/15/22   Ar Automatic Reconciliation   latanoprost (XALATAN) 0.005 % ophthalmic solution Apply 1 drop to eye nightly    Ar Automatic Reconciliation   magnesium oxide (MAG-OX) 400 (240 Mg) MG tablet TAKE 1 TABLET BY MOUTH EVERY DAY 1/14/22   Ar Automatic Reconciliation   montelukast (SINGULAIR) 10 MG tablet TAKE 1 TABLET BY MOUTH EVERY DAY AT BEDTIME 3/15/22   Ar Automatic Reconciliation   nitroGLYCERIN (NITROSTAT) 0.4 MG SL tablet Place 0.4 mg under the tongue 3/11/22   Ar Automatic Reconciliation   polyethylene glycol (GLYCOLAX) 17 GM/SCOOP powder Take 17 g by mouth daily  Patient not taking: Reported on 12/29/2022 9/21/21   Ar Automatic Reconciliation   metFORMIN (GLUCOPHAGE) 500 MG tablet TAKE TWO TABLETS BY MOUTH 2 TIMES A DAY 3/23/22 8/8/22  Ar Automatic Reconciliation     Allergies   Allergen Reactions Acetaminophen Hives     Per spouse has small amount of hives, takes 1/2 and tolerates     Azithromycin Hives    Morphine Hallucinations     Muscle twitching. jerking    Oxaprozin Other (See Comments)     ELEVATED BLOOD PRESSURE    Oxycodone Anxiety          Review of Systems:  10 point Ros negative other than what mentioned in HPI. Pertinent items are noted in HPI. Objective:   Blood pressure 136/65, pulse 64, temperature 98.2 °F (36.8 °C), temperature source Oral, resp. rate 19, height 6' 1\" (1.854 m), weight 235 lb 10.8 oz (106.9 kg), SpO2 94 %.   Temp (24hrs), Av.7 °F (36.5 °C), Min:97.3 °F (36.3 °C), Max:98.2 °F (36.8 °C)       Lines: none    Exam:     General: NC/AT, alert and cooperative, looks stated age, in NAD   HEENT: PERRL, non-icteric sclera, no splinter hemorrhages   Neck: supple, symmetric, no masses or lymphadenopathy   Cardiac:Nl S1/S2, no murmurs/rubs/gallops/clicks, trace LE edema, well healed ICD site L chest   Pulmonary:clear bilaterally davian/wheezes, good air movement    Abdomen: soft, NT, non-distended, BS normal, no CVA tenderness   Skin:no rashes, lesions or wounds   MSK:no enlarged or swollen joints in limbs or sternoclavicular area   Extremities: no cords/clots/cyanosis, pulses palpable and symmetric    Psychiatric: mood and affect appropriate to situation       Data Review:   Recent Results (from the past 24 hour(s))   POCT Glucose    Collection Time: 23 11:27 AM   Result Value Ref Range    POC Glucose 322 (H) 65 - 100 mg/dL    Performed by: [employer] Spins    POCT Glucose    Collection Time: 23  4:08 PM   Result Value Ref Range    POC Glucose 342 (H) 65 - 100 mg/dL    Performed by: [facility]OnKure[facility]    POCT Glucose    Collection Time: 23  8:40 PM   Result Value Ref Range    POC Glucose 280 (H) 65 - 100 mg/dL    Performed by: Pino    POCT Glucose    Collection Time: 23  8:14 AM   Result Value Ref Range    POC Glucose 320 (H) 65 - 100 mg/dL    Performed by: Corazon Hsu         Microbiology:  [unfilled]        Studies/Imaging:  personally reviewed      Signed By: Bryant Rivera MD     January 2, 2023

## 2023-01-02 NOTE — PROGRESS NOTES
Patient resting in bed, CPAP in use with 2LPM Bleed in. Safety measures in place. Respirations regular and unlabored. NAD noted. Will continue to monitor. Preparing BSR to oncoming RN. 0

## 2023-01-02 NOTE — PROGRESS NOTES
ACUTE PHYSICAL THERAPY GOALS:   (Developed with and agreed upon by patient and/or caregiver. )  LTG:  (1.)Mr. Azael Galeas will move from supine to sit and sit to supine , scoot up and down, and roll side to side in bed with STAND BY ASSIST within 7 treatment day(s). (2.)Mr. Azael Galeas will transfer from bed to chair and chair to bed with STAND BY ASSIST using the least restrictive device within 7 treatment day(s). (3.)Mr. Azael Galeas will ambulate with STAND BY ASSIST for 50+ feet with the least restrictive device within 7 treatment day(s) while maintaining normal vital signs, limited WB L heel.  ________________________________________________________________________________________________     PHYSICAL THERAPY Initial Assessment and PM  (Link to Caseload Tracking: PT Visit Days : 1  Acknowledge Orders  Time In/Out   S Mil Menendez is a 65 y.o. male   PRIMARY DIAGNOSIS: COPD exacerbation ([facility]  Hypoxia [R09.02]  COPD exacerbation ([facility] [J44.1]  Congestive heart failure, unspecified HF chronicity, unspecified heart failure type ([facility] [I50.9]       Reason for Referral: Difficulty in walking, Not elsewhere classified (R26.2)  Inpatient: Payor: Liban Easley / Plan: MEDICARE PART A AND B / Product Type: *No Product type* /     ASSESSMENT:     REHAB RECOMMENDATIONS:   Recommendation to date pending progress:  Setting:  [facility]:    None     ASSESSMENT:  Mr. Azael Galeas presents to hospital on 12/29/22 with cough, low O2, weakness, s/p flu. Pt lives with spouse, has been limited at baseline due to L heel wound, per family vascular surgeon wants limited ambulation. Pt has assist from spouse for ADLs, mainly transferring to [facility], ambulating with RW very short distances at home. Today pt MIN A for sit to stand from chair, ambulated with RW 20', slow but steady. Pt and spouse feel can return home and resume HHPT at this time.  PT to follow for acute care needs to address deficits noted above. [facility] 6 Clicks Basic Mobility Inpatient Short Form  -PAC Mobility Inpatient   How much difficulty turning over in bed?: A Little  How much difficulty sitting down on / standing up from a chair with arms?: A Little  How much difficulty moving from lying on back to sitting on side of bed?: A Little  How much help from another person moving to and from a bed to a chair?: A Little  How much help from another person needed to walk in hospital room?: A Little  How much help from another person for climbing 3-5 steps with a railing?: A Lot  AM-PAC Inpatient Mobility Raw Score : 17  AM-PAC Inpatient T-Scale Score : 42.13  Mobility Inpatient CMS 0-100% Score: 50.57  Mobility Inpatient CMS G-Code Modifier : CK    SUBJECTIVE:   Mr. Remy Riley states, \"I hope they will send me home tomorrow\"     Social/Functional Lives With: Spouse  Type of Home: House  Home Access: Stairs to enter with rails  Entrance Stairs - Number of Steps: 2  Bathroom Shower/Tub: Walk-in shower  Bathroom Toilet: Handicap height  Bathroom Equipment: Shower chair  Receives Help From: Family  ADL Assistance: Needs assistance  Ambulation Assistance:  (RW)  Transfer Assistance: Independent  Active : Yes  Additional Comments: Wife assists w/ bathing, LB dressing, and occasionally for thorougness w/ toilet hygiene.   Limited at baseline due to L heel wound  OBJECTIVE:     PAIN: VITALS / O2: PRECAUTION / Kristal Kansky / DRAINS:   Pre Treatment:   Pain Assessment: None - Denies Pain      Post Treatment: 0/10 Vitals        Oxygen  O2 Therapy: Room air   Purewick    RESTRICTIONS/PRECAUTIONS:                    GROSS EVALUATION: Intact Impaired (Comments):   AROM [x]     PROM [x]    Strength [x]     Balance [] Standing - Static: Fair, +  Standing - Dynamic: Fair   Posture [] Forward Head  Rounded Shoulders   Sensation [x]     Coordination [x]      Tone [x]     Edema [x]    Activity Tolerance []  General fatigue    [] COGNITION/  PERCEPTION: Intact Impaired (Comments):   Orientation [x]     Vision [x]     Hearing [x]     Cognition  [x]       MOBILITY: I Mod I S SBA CGA Min Mod Max Total  NT x2 Comments:   Bed Mobility    Rolling [] [] [] [] [] [] [] [] [] [x] [] In chair   Supine to Sit [] [] [] [] [] [] [] [] [] [x] []    Scooting [] [] [] [] [] [] [] [] [] [x] []    Sit to Supine [] [] [] [] [] [] [] [] [] [x] [] In chair   Transfers    Sit to Stand [] [] [] [] [] [x] [] [] [] [] []    Bed to Chair [] [] [] [] [] [] [] [] [] [x] []    Stand to Sit [] [] [] [] [] [x] [] [] [] [] []     [] [] [] [] [] [] [] [] [] [] []    I=Independent, Mod I=Modified Independent, S=Supervision, SBA=Standby Assistance, CGA=Contact Guard Assistance,   Min=Minimal Assistance, Mod=Moderate Assistance, Max=Maximal Assistance, Total=Total Assistance, NT=Not Tested    GAIT: I Mod I S SBA CGA Min Mod Max Total  NT x2 Comments:   Level of Assistance [] [] [] [] [x] [x] [] [] [] [] []    Distance 20  feet    DME Rolling Walker    Gait Quality Decreased paul , Decreased step clearance, Decreased step length, and Steppage    Weightbearing Status      Stairs      I=Independent, Mod I=Modified Independent, S=Supervision, SBA=Standby Assistance, CGA=Contact Guard Assistance,   Min=Minimal Assistance, Mod=Moderate Assistance, Max=Maximal Assistance, Total=Total Assistance, NT=Not Tested    PLAN:   FREQUENCY AND DURATION: 3 times/week for duration of hospital stay or until stated goals are met, whichever comes first.    THERAPY PROGNOSIS: Good    PROBLEM LIST:   (Skilled intervention is medically necessary to address:)  Decreased ADL/Functional Activities  Decreased Activity Tolerance  Decreased Balance  Decreased Gait Ability  Decreased Transfer Abilities INTERVENTIONS PLANNED:   (Benefits and precautions of physical therapy have been discussed with the patient.)  Therapeutic Activity  Therapeutic Exercise/HEP  Gait Training  Education       TREATMENT: EVALUATION: LOW COMPLEXITY: (Untimed Charge)    TREATMENT:   Therapeutic Activity (10 Minutes): Therapeutic activity included Scooting, Transfer Training, Ambulation on level ground, Sitting balance , and Standing balance to improve functional Balance, Coordination, and Mobility.     TREATMENT GRID:  N/A    AFTER TREATMENT PRECAUTIONS: Bed/Chair Locked, Call light within reach, Chair, Needs within reach, RN notified, and Visitors at bedside    INTERDISCIPLINARY COLLABORATION:  RN/ PCT and PT/ PTA    EDUCATION: Education Given To: Patient  Education Provided: Transfer Training  Education Method: Verbal  Barriers to Learning: None  Education Outcome: Verbalized understanding    TIME IN/OUT:  Time In: 1409  Time Out: [address]  Minutes: [address], PT

## 2023-01-02 NOTE — PROGRESS NOTES
Report received from off going RN. Patient resting in bed with CPAP in use and oxygen bleed in. Patient alert and oriented with NAD and respirations regular and  unlabored. Will continue to monitor. Patient verbalizes no needs except sleep at this time.

## 2023-01-02 NOTE — PROGRESS NOTES
Hospitalist Progress Note   Admit Date:  2022 10:02 AM   Name:  Abdullahi Holliday   Age:  65 y.o. Sex:  male  :  1945   MRN:  [id number]   Room:  821/    Presenting Complaint: Shortness of Breath     Reason(s) for Admission: Hypoxia [R09.02]  COPD exacerbation ([address] [J44.1]  Congestive heart failure, unspecified HF chronicity, unspecified heart failure type [facility]) [I50.9]     Hospital Course:   Mr. Darylene Paddy is a nice 64 y/o WM with a h/o ischemic CM, chronic sCHF, SHERI, PAD, COPD, HTN, DM2, HLD and atrial fib who was admitted to our service on  with acute hypoxemic respiratory failure likely due to acute influenza with suspected COPDe. He was started on supplemental O2, steroids, Tamiflu and nebulizers. Blood cultures grew alpha strep and CoNS. He is on ceftriaxone. Weaned off O2. Subjective/24hr Events (23): Feels well, eager to go home. Afebrile overnight, remains on room air. No chest pain or SOB. Assessment & Plan:   # Bacteremia due to alpha streptococcus, CoNS              - Suspect coag neg staph is a contaminant since only 1 bottle. Strep is in both bottles of 1 set. - Rocephin started on . Repeat blood cultures are negative to date. No consolidation on CXR. No recent dental work. L heel wound appears to be healing well and he follows regularly with outpatient wound care. CT head mentioned possible mastoiditis, but exam is normal and no complaints. ID consult today for abx recommendations. # Acute hypoxemic respiratory failure 2/2 influenza              - CXR w/o consolidation. On RA as of . Finishes Tamiflu today, . Change to prednisone /2, will taper quickly over a few days. # HypoK              - Resolved. # CAD // chronic sCHF              - Euvolemic. Coreg resumed . Con't Plavix,      # Atrial fibrillation              - Con't Eliquis, amio. Coreg resumed . # DM2              - Hold orals. Con't SSI.  Glucose elevated due to steroids, hopefully will improve now that he is changed to orals. Anticipated discharge needs: Therapy evals pending. Diet:  ADULT DIET; Regular; 4 carb choices (60 gm/meal)  DVT PPx: Eliquis  Code status: Full Code    Hospital Problems:  Principal Problem:    COPD exacerbation ([facility]  Active Problems:    ICD (implantable cardioverter-defibrillator) in place    Influenza    Atrial fibrillation ([facility]    COPD (chronic obstructive pulmonary disease) (HCC)    DM (diabetes mellitus) type II, controlled, with peripheral vascular disorder ([facility]  Resolved Problems:    * No resolved hospital problems. *      Objective:   Patient Vitals for the past 24 hrs:   Temp Pulse Resp BP SpO2   01/02/23 0814 98.2 °F (36.8 °C) 64 19 136/65 94 %   01/02/23 0456 -- 67 -- -- --   01/02/23 0439 97.7 °F (36.5 °C) 60 19 130/73 92 %   01/02/23 0204 -- 60 -- -- --   01/01/23 2345 98.1 °F (36.7 °C) 61 19 (!) 143/77 91 %   01/01/23 2009 -- 65 -- -- --   01/1945 97.3 °F (36.3 °C) 58 19 138/77 94 %   01/01/23 1607 97.3 °F (36.3 °C) 61 19 127/67 92 %   01/01/23 1125 97.7 °F (36.5 °C) 59 19 133/73 90 %       Oxygen Therapy  SpO2: 94 %  Pulse via Oximetry: 75 beats per minute  Pulse Oximeter Device Mode: Intermittent  O2 Device: PAP (positive airway pressure)  O2 Flow Rate (L/min): 2 L/min    Estimated body mass index is 31.09 kg/m² as calculated from the following:    Height as of this encounter: 6' 1\" (1.854 m). Weight as of this encounter: 235 lb 10.8 oz (106.9 kg). Intake/Output Summary (Last 24 hours) at 1/2/2023 0906  Last data filed at 1/2/2023 0818  Gross per 24 hour   Intake 5 ml   Output --   Net 5 ml         Physical Exam:   Blood pressure 136/65, pulse 64, temperature 98.2 °F (36.8 °C), temperature source Oral, resp. rate 19, height 6' 1\" (1.854 m), weight 235 lb 10.8 oz (106.9 kg), SpO2 94 %. General:    Well nourished.     Head:  Normocephalic, atraumatic  Eyes:  Sclerae appear normal.  Pupils equally round.  ENT:  Nares appear normal.  Moist oral mucosa. Normal external ear examination, no inflammation or erythema of either mastoid prominences. Neck:  No restricted ROM. Trachea midline   CV:   RRR. No m/r/g. No jugular venous distension. Lungs:   CTAB. No wheezing, rhonchi, or rales. Symmetric expansion. Room air O2. Abdomen:   Soft, nontender, nondistended. Extremities: No cyanosis or clubbing. No edema. Superficial wound to L heel with minimal surrounding erythema, no fluctuance or crepitus. Skin:     No rashes and normal coloration. Warm and dry. Neuro:  CN II-XII grossly intact. Sensation intact. Psych:  Normal mood and affect.       I have personally reviewed labs and tests showing:  Recent Labs:  Recent Results (from the past 48 hour(s))   POCT Glucose    Collection Time: 12/31/22 11:21 AM   Result Value Ref Range    POC Glucose 342 (H) 65 - 100 mg/dL    Performed by: Sulma    POCT Glucose    Collection Time: 12/31/22  4:12 PM   Result Value Ref Range    POC Glucose 354 (H) 65 - 100 mg/dL    Performed by: Venecia Hoang    POCT Glucose    Collection Time: 12/31/22  8:33 PM   Result Value Ref Range    POC Glucose 306 (H) 65 - 100 mg/dL    Performed by: Saul    CBC with Auto Differential    Collection Time: 01/01/23  4:22 AM   Result Value Ref Range    WBC 5.4 4.3 - 11.1 K/uL    RBC 3.34 (L) 4.23 - 5.6 M/uL    Hemoglobin 10.0 (L) 13.6 - 17.2 g/dL    Hematocrit 30.6 (L) 41.1 - 50.3 %    MCV 91.6 82 - 102 FL    MCH 29.9 26.1 - 32.9 PG    MCHC 32.7 31.4 - 35.0 g/dL    RDW 15.4 (H) 11.9 - 14.6 %    Platelets 674 673 - 049 K/uL    MPV 11.2 9.4 - 12.3 FL    nRBC 0.00 0.0 - 0.2 K/uL    Seg Neutrophils 79 (H) 43 - 78 %    Lymphocytes 13 13 - 44 %    Monocytes 7 4.0 - 12.0 %    Eosinophils % 0 (L) 0.5 - 7.8 %    Basophils 0 0.0 - 2.0 %    Immature Granulocytes 1 0.0 - 5.0 %    Segs Absolute 4.2 1.7 - 8.2 K/UL    Absolute Lymph # 0.7 0.5 - 4.6 K/UL    Absolute Mono # 0.4 0.1 - 1.3 K/UL    Absolute Eos # 0.0 0.0 - 0.8 K/UL    Basophils Absolute 0.0 0.0 - 0.2 K/UL    Absolute Immature Granulocyte 0.1 0.0 - 0.5 K/UL    RBC Comment SLIGHT  ANISOCYTOSIS + POIKILOCYTOSIS        RBC Comment OCCASIONAL  OVALOCYTES        WBC Comment Result Confirmed By Smear      Platelet Comment ADEQUATE      Differential Type AUTOMATED     Basic Metabolic Panel    Collection Time: 01/01/23  4:22 AM   Result Value Ref Range    Sodium 138 133 - 143 mmol/L    Potassium 3.6 3.5 - 5.1 mmol/L    Chloride 101 101 - 110 mmol/L    CO2 33 (H) 21 - 32 mmol/L    Anion Gap 4 2 - 11 mmol/L    Glucose 243 (H) 65 - 100 mg/dL    BUN 20 8 - 23 MG/DL    Creatinine 0.90 0.8 - 1.5 MG/DL    Est, Glom Filt Rate >60 >60 ml/min/1.73m2    Calcium 8.6 8.3 - 10.4 MG/DL   POCT Glucose    Collection Time: 01/01/23  8:17 AM   Result Value Ref Range    POC Glucose 235 (H) 65 - 100 mg/dL    Performed by: [employer]    POCT Glucose    Collection Time: 01/01/23 11:27 AM   Result Value Ref Range    POC Glucose 322 (H) 65 - 100 mg/dL    Performed by: [employer]    POCT Glucose    Collection Time: 01/01/23  4:08 PM   Result Value Ref Range    POC Glucose 342 (H) 65 - 100 mg/dL    Performed by: [employer]    POCT Glucose    Collection Time: 01/01/23  8:40 PM   Result Value Ref Range    POC Glucose 280 (H) 65 - 100 mg/dL    Performed by: [employer]    POCT Glucose    Collection Time: 01/02/23  8:14 AM   Result Value Ref Range    POC Glucose 320 (H) 65 - 100 mg/dL    Performed by: [employer]        I have personally reviewed imaging studies showing: Other Studies:  CT HEAD WO CONTRAST   Final Result   1. No acute intracranial process evident by noncontrast CT study of the head. 2.  Moderate right mastoid effusion with fluid entering the right middle ear. This can been indicator for right otomastoiditis. Recommend clinical   correlation. This report was made using voice transcription. Despite my best efforts to avoid   any, transcription errors may persist. If there is any question about the   accuracy of the report or need for clarification, then please call [phone], or text me through perfectserv for clarification or correction. XR CHEST PORTABLE   Final Result   1. Only mild cardiomegaly seen which does appear improved from the prior study. Some component of early or mild heart failure is difficult to exclude given the   patient's acute symptoms, however. No potential acute process is otherwise   suggested.           Current Meds:  Current Facility-Administered Medications   Medication Dose Route Frequency    predniSONE (DELTASONE) tablet 40 mg  40 mg Oral Daily    tuberculin injection 5 Units  5 Units IntraDERmal Once    cefTRIAXone (ROCEPHIN) 2,000 mg in sodium chloride 0.9 % 50 mL IVPB mini-bag  2,000 mg IntraVENous Q24H    sodium chloride flush 0.9 % injection 5-40 mL  5-40 mL IntraVENous 2 times per day    sodium chloride flush 0.9 % injection 5-40 mL  5-40 mL IntraVENous PRN    0.9 % sodium chloride infusion   IntraVENous PRN    ondansetron (ZOFRAN-ODT) disintegrating tablet 4 mg  4 mg Oral Q8H PRN    Or    ondansetron (ZOFRAN) injection 4 mg  4 mg IntraVENous Q6H PRN    polyethylene glycol (GLYCOLAX) packet 17 g  17 g Oral Daily PRN    oseltamivir (TAMIFLU) capsule 75 mg  75 mg Oral BID    amiodarone (CORDARONE) tablet 200 mg  200 mg Oral Daily    apixaban (ELIQUIS) tablet 5 mg  5 mg Oral Q12H    carvedilol (COREG) tablet 25 mg  25 mg Oral BID WC    clopidogrel (PLAVIX) tablet 75 mg  75 mg Oral Daily    latanoprost (XALATAN) 0.005 % ophthalmic solution 1 drop  1 drop Both Eyes Nightly    magnesium oxide (MAG-OX) tablet 400 mg  400 mg Oral Daily    montelukast (SINGULAIR) tablet 10 mg  10 mg Oral Nightly    rosuvastatin (CRESTOR) tablet 10 mg  10 mg Oral Nightly    [Held by provider] valsartan (DIOVAN) tablet 80 mg  80 mg Oral Daily    glucose chewable tablet 16 g  4 tablet Oral PRN    dextrose bolus 10% 125 mL  125 mL IntraVENous PRN    Or    dextrose bolus 10% 250 mL  250 mL IntraVENous PRN    glucagon (rDNA) injection 1 mg  1 mg SubCUTAneous PRN    dextrose 10 % infusion   IntraVENous Continuous PRN    insulin lispro (HUMALOG) injection vial 0-8 Units  0-8 Units SubCUTAneous TID WC    insulin lispro (HUMALOG) injection vial 0-4 Units  0-4 Units SubCUTAneous Nightly    albuterol (PROVENTIL) nebulizer solution 2.5 mg  2.5 mg Nebulization Q6H PRN    tiotropium (SPIRIVA RESPIMAT) 2.5 MCG/ACT inhaler 2 puff  2 puff Inhalation Daily    potassium chloride (KLOR-CON M) extended release tablet 40 mEq  40 mEq Oral Once    furosemide (LASIX) tablet 40 mg  40 mg Oral Daily       Signed:  Magalis Fung MD    Part of this note may have been written by using a voice dictation software. The note has been proof read but may still contain some grammatical/other typographical errors.

## 2023-01-02 NOTE — PROGRESS NOTES
ACUTE OCCUPATIONAL THERAPY GOALS:   (Developed with and agreed upon by patient and/or caregiver.)  1. Pt will toilet with SBA   2. Pt will complete functional mobility for ADLs with SBA using AD as needed  3. Pt will complete upper body bathing and dressing with set up using AE as needed  4. Pt will complete grooming and hygiene at sink with SBA  5. Pt will demonstrate independence with HEP to promote increased BUE strength and functional use for ADLs  6. Pt will tolerate 23 minutes functional activity with min or fewer rest breaks to promote increased endurance for ADLs      Timeframe: 7 days      OCCUPATIONAL THERAPY Initial Assessment and Daily Note       OT Visit Days: 1  Acknowledge Orders  Time  OT Charge Capture  Rehab Caseload Tracker      Apollo Florentino is a 65 y.o. male   PRIMARY DIAGNOSIS: COPD exacerbation ([facility]  Hypoxia [R09.02]  COPD exacerbation (HCC) [J44.1]  Congestive heart failure, unspecified HF chronicity, unspecified heart failure type ([facility] [I50.9]       Reason for Referral: Generalized Muscle Weakness (M62.81)  Inpatient: Payor: MEDICARE / Plan: MEDICARE PART A AND B / Product Type: *No Product type* /     ASSESSMENT:     REHAB RECOMMENDATIONS:   Recommendation to date pending progress:  Setting:  Home Health Therapy    Equipment:    None     ASSESSMENT:  Mr. Tony Amaro was admitted w/ flu, respiratory failure. Pt presented generally weak with deficits in strength, endurance, mobility, and balance impacting ADLs. Pt required min A for fxnal mobility using RW and for toileting, mod A to stand from standard height toilet. Pt fatigued very quickly with ADLs and required chair to be brought to bathroom door d/t weakness and knees buckling. Pt  is below his functional baseline and would benefit from skilled OT services to address deficits.       [address] [facility] [address] [id number] AM-PAC 6 Clicks Daily Activity Inpatient Short Form:    AM-PAC Daily Activity Inpatient   How much help for putting on and taking off regular lower body clothing?: A Lot  How much help for Bathing?: A Lot  How much help for Toileting?: A Little  How much help for putting on and taking off regular upper body clothing?: A Little  How much help for taking care of personal grooming?: A Little  How much help for eating meals?: None  AM-PAC Inpatient Daily Activity Raw Score: 17  AM-PAC Inpatient ADL T-Scale Score : 37.26  ADL Inpatient CMS 0-100% Score: 50.11  ADL Inpatient CMS G-Code Modifier : CK           SUBJECTIVE:     Mr. Ally Martinez states, \"I feel weak. \"     Social/Functional Lives With: Spouse  Type of Home: House  Home Access: Stairs to enter with rails  Entrance Stairs - Number of Steps: 2  Bathroom Shower/Tub: Walk-in shower  Bathroom Toilet: Handicap height  Bathroom Equipment: Shower chair  Receives Help From: Family  ADL Assistance: Needs assistance  Ambulation Assistance:  (RW)  Transfer Assistance: Independent  Active : Yes  Additional Comments: Wife assists w/ bathing, LB dressing, and occasionally for thorougness w/ toilet hygiene.     OBJECTIVE:     [facility] / Courtney Raw / AIRWAY: NA    RESTRICTIONS/PRECAUTIONS:       PAIN: VITALS / O2:   Pre Treatment: 0         Post Treatment: 0       Vitals          Oxygen            GROSS EVALUATION: INTACT IMPAIRED   (See Comments)   UE AROM [] []   UE PROM [] []   Strength []  Generally weak     Posture / Balance []  Min A w/ RW   Sensation []     Coordination []       Tone []       Edema []    Activity Tolerance []  Poor      Hand Dominance R [] L []      COGNITION/  PERCEPTION: INTACT IMPAIRED   (See Comments)   Orientation [x]     Vision [x]     Hearing [x]     Cognition  [x]     Perception []       MOBILITY: I Mod I S SBA CGA Min Mod Max Total  NT x2 Comments:   Bed Mobility    Rolling [] [] [] [] [] [] [] [] [] [] []    Supine to Sit [] [] [] [] [x] [] [] [] [] [] []    Scooting [] [] [] [] [] [] [] [] [] [] []    Sit to Supine [] [] [] [] [] [] [] [] [] [] []    Transfers    Sit to Stand [] [] [] [] [] [x] [] [] [] [] []    Bed to Chair [] [] [] [] [] [x] [] [] [] [] []    Stand to Sit [] [] [] [] [] [x] [] [] [] [] []    Tub/Shower [] [] [] [] [] [] [] [] [] [] []     Toilet [] [] [] [] [] [] [x] [] [] [] []      [] [] [] [] [] [] [] [] [] [] []    I=Independent, Mod I=Modified Independent, S=Supervision/Setup, SBA=Standby Assistance, CGA=Contact Guard Assistance, Min=Minimal Assistance, Mod=Moderate Assistance, Max=Maximal Assistance, Total=Total Assistance, NT=Not Tested    ACTIVITIES OF DAILY LIVING: I Mod I S SBA CGA Min Mod Max Total NT Comments   BASIC ADLs:              Upper Body Bathing  [] [] [] [] [] [] [] [] [] []    Lower Body Bathing [] [] [] [] [] [] [] [] [] []    Toileting [] [] [] [] [] [x] [] [] [] []    Upper Body Dressing [] [] [] [] [] [] [] [] [] []    Lower Body Dressing [] [] [] [] [] [] [] [] [x] []    Feeding [] [] [] [] [] [] [] [] [] []    Grooming [] [] [] [] [x] [] [] [] [] []    Personal Device Care [] [] [] [] [] [] [] [] [] []    Functional Mobility [] [] [] [] [] [x] [] [] [] []    I=Independent, Mod I=Modified Independent, S=Supervision/Setup, SBA=Standby Assistance, CGA=Contact Guard Assistance, Min=Minimal Assistance, Mod=Moderate Assistance, Max=Maximal Assistance, Total=Total Assistance, NT=Not Tested    PLAN:   FREQUENCY/DURATION   OT Plan of Care: 3 times/week for duration of hospital stay or until stated goals are met, whichever comes first.    PROBLEM LIST:   (Skilled intervention is medically necessary to address:)  Decreased ADL/Functional Activities  Decreased Activity Tolerance  Decreased Balance  Decreased Strength  Decreased Transfer Abilities   INTERVENTIONS PLANNED:  (Benefits and precautions of occupational therapy have been discussed with the patient.)  Self Care Training  Therapeutic Activity  Therapeutic Exercise/HEP  Neuromuscular Re-education  Manual Therapy  Education         TREATMENT:     EVALUATION: MODERATE COMPLEXITY: (Untimed Charge)    TREATMENT:   Self Care (23 minutes): Patient participated in toileting, lower body dressing, and grooming ADLs in unsupported sitting and standing with minimal verbal cueing to increase independence, decrease assistance required, and increase activity tolerance. Patient also participated in functional mobility and energy conservation training to increase independence, decrease assistance required, and increase activity tolerance.      TREATMENT GRID:  N/A    AFTER TREATMENT PRECAUTIONS: Chair, Needs within reach, and RN notified    INTERDISCIPLINARY COLLABORATION:  RN/ PCT    EDUCATION:  Education Given To: Patient  Education Provided: Role of Therapy;Plan of Care;Energy Conservation    TOTAL TREATMENT DURATION AND TIME:  Time In: 1787  Time Out: 1024  Minutes: [address], OT

## 2023-01-03 LAB
ANION GAP SERPL CALC-SCNC: 1 MMOL/L (ref 2–11)
BUN SERPL-MCNC: 22 MG/DL (ref 8–23)
CALCIUM SERPL-MCNC: 8.3 MG/DL (ref 8.3–10.4)
CHLORIDE SERPL-SCNC: 102 MMOL/L (ref 101–110)
CO2 SERPL-SCNC: 35 MMOL/L (ref 21–32)
CREAT SERPL-MCNC: 0.9 MG/DL (ref 0.8–1.5)
GLUCOSE BLD STRIP.AUTO-MCNC: 151 MG/DL (ref 65–100)
GLUCOSE BLD STRIP.AUTO-MCNC: 204 MG/DL (ref 65–100)
GLUCOSE BLD STRIP.AUTO-MCNC: 292 MG/DL (ref 65–100)
GLUCOSE BLD STRIP.AUTO-MCNC: 320 MG/DL (ref 65–100)
GLUCOSE SERPL-MCNC: 181 MG/DL (ref 65–100)
MAGNESIUM SERPL-MCNC: 2 MG/DL (ref 1.8–2.4)
MM INDURATION, POC: 0 MM (ref 0–5)
POTASSIUM SERPL-SCNC: 3.5 MMOL/L (ref 3.5–5.1)
PPD, POC: NEGATIVE
SERVICE CMNT-IMP: ABNORMAL
SODIUM SERPL-SCNC: 138 MMOL/L (ref 133–143)

## 2023-01-03 PROCEDURE — 2580000003 HC RX 258: Performed by: INTERNAL MEDICINE

## 2023-01-03 PROCEDURE — 83735 ASSAY OF MAGNESIUM: CPT

## 2023-01-03 PROCEDURE — 82962 GLUCOSE BLOOD TEST: CPT

## 2023-01-03 PROCEDURE — 36415 COLL VENOUS BLD VENIPUNCTURE: CPT

## 2023-01-03 PROCEDURE — 94640 AIRWAY INHALATION TREATMENT: CPT

## 2023-01-03 PROCEDURE — 2700000000 HC OXYGEN THERAPY PER DAY

## 2023-01-03 PROCEDURE — 6360000002 HC RX W HCPCS: Performed by: INTERNAL MEDICINE

## 2023-01-03 PROCEDURE — 80048 BASIC METABOLIC PNL TOTAL CA: CPT

## 2023-01-03 PROCEDURE — 6370000000 HC RX 637 (ALT 250 FOR IP): Performed by: INTERNAL MEDICINE

## 2023-01-03 PROCEDURE — 94760 N-INVAS EAR/PLS OXIMETRY 1: CPT

## 2023-01-03 PROCEDURE — 1100000000 HC RM PRIVATE

## 2023-01-03 PROCEDURE — 99223 1ST HOSP IP/OBS HIGH 75: CPT | Performed by: INTERNAL MEDICINE

## 2023-01-03 RX ADMIN — VANCOMYCIN HYDROCHLORIDE 1000 MG: 1 INJECTION, POWDER, LYOPHILIZED, FOR SOLUTION INTRAVENOUS at 12:38

## 2023-01-03 RX ADMIN — ROSUVASTATIN 10 MG: 10 TABLET, FILM COATED ORAL at 20:22

## 2023-01-03 RX ADMIN — LATANOPROST 1 DROP: 50 SOLUTION OPHTHALMIC at 20:26

## 2023-01-03 RX ADMIN — MONTELUKAST 10 MG: 10 TABLET, FILM COATED ORAL at 20:22

## 2023-01-03 RX ADMIN — INSULIN LISPRO 2 UNITS: 100 INJECTION, SOLUTION INTRAVENOUS; SUBCUTANEOUS at 11:57

## 2023-01-03 RX ADMIN — MAGNESIUM GLUCONATE 500 MG ORAL TABLET 400 MG: 500 TABLET ORAL at 09:20

## 2023-01-03 RX ADMIN — TIOTROPIUM BROMIDE INHALATION SPRAY 2 PUFF: 3.12 SPRAY, METERED RESPIRATORY (INHALATION) at 09:04

## 2023-01-03 RX ADMIN — APIXABAN 5 MG: 5 TABLET, FILM COATED ORAL at 09:20

## 2023-01-03 RX ADMIN — SODIUM CHLORIDE, PRESERVATIVE FREE 10 ML: 5 INJECTION INTRAVENOUS at 20:26

## 2023-01-03 RX ADMIN — CARVEDILOL 25 MG: 25 TABLET, FILM COATED ORAL at 16:27

## 2023-01-03 RX ADMIN — AMIODARONE HYDROCHLORIDE 200 MG: 200 TABLET ORAL at 09:20

## 2023-01-03 RX ADMIN — CLOPIDOGREL BISULFATE 75 MG: 75 TABLET ORAL at 09:20

## 2023-01-03 RX ADMIN — PREDNISONE 40 MG: 20 TABLET ORAL at 09:20

## 2023-01-03 RX ADMIN — CEFTRIAXONE SODIUM 2000 MG: 2 INJECTION, POWDER, FOR SOLUTION INTRAMUSCULAR; INTRAVENOUS at 11:56

## 2023-01-03 RX ADMIN — CARVEDILOL 25 MG: 25 TABLET, FILM COATED ORAL at 09:20

## 2023-01-03 RX ADMIN — FUROSEMIDE 40 MG: 40 TABLET ORAL at 09:20

## 2023-01-03 RX ADMIN — APIXABAN 5 MG: 5 TABLET, FILM COATED ORAL at 20:22

## 2023-01-03 RX ADMIN — VANCOMYCIN HYDROCHLORIDE 1000 MG: 1 INJECTION, POWDER, LYOPHILIZED, FOR SOLUTION INTRAVENOUS at 23:35

## 2023-01-03 RX ADMIN — INSULIN LISPRO 6 UNITS: 100 INJECTION, SOLUTION INTRAVENOUS; SUBCUTANEOUS at 16:27

## 2023-01-03 ASSESSMENT — PAIN SCALES - GENERAL: PAINLEVEL_OUTOF10: 0

## 2023-01-03 NOTE — PROGRESS NOTES
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 Wound care is seeing for the left heel. Overall suspicion is low for endocarditis but he is high risk with all of his co-morbidities. Anti-infectives:   IV vanc  IV ceftriaxone    Subjective: Interval history: afebrile; coughing less; no significant back pain    Allergies   Allergen Reactions    Acetaminophen Hives     Per spouse has small amount of hives, takes 1/2 and tolerates     Azithromycin Hives    Morphine Hallucinations     Muscle twitching. jerking    Oxaprozin Other (See Comments)     ELEVATED BLOOD PRESSURE    Oxycodone Anxiety        Review of Systems:  A comprehensive review of systems is negative except as stated above.       Objective:     Visit Vitals  BP (!) 155/89   Pulse 60   Temp 97.6 °F (36.4 °C) (Oral)   Resp 17   Ht 6' 1\" (1.854 m)   Wt 235 lb 10.8 oz (106.9 kg)   SpO2 94%   BMI 31.09 kg/m²     Temp (24hrs), Av.7 °F (36.5 °C), Min:97.3 °F (36.3 °C), Max:98.4 °F (36.9 °C)       Lines:  Peripheral IV:       Physical Exam:    General:  In no acute distress; coughing;    Eyes:     Mouth/Throat:    Neck:    Lungs:   Breathing comfortably on room air   CV:     Abdomen:      Extremities: L heel wound with slough but no signs of infection   Skin: No rash   Lymph nodes:    Musculoskeletal:    Lines/Devices:     Psych: Appropriate       Data Review:     CBC:  Recent Labs     23  0422   WBC 5.4   HGB 10.0*   HCT 30.6*        BMP:  Recent Labs     01/01/23  0422 01/03/23  0412   BUN 20 22    138   K 3.6 3.5    102   CO2 33* 35*       LFTS:  No results for input(s): ALT, TP, ALB in the last 72 hours.     Invalid input(s): TBILI, SGOT, AP    Microbiology:   Reviewed    Imaging:   Reviewed     Signed By: Lily Davison MD     January 3, 2023

## 2023-01-03 NOTE — PROGRESS NOTES
VANCO DAILY FOLLOW UP NOTE  [address] [facility] Pharmacokinetic Monitoring Service - Vancomycin    Consulting Provider: Dr. Therese Lui   Indication: ICD infection  Target Concentration: Goal AUC/GIOVANNY 400-600 mg*hr/L  Day of Therapy: 2  Additional Antimicrobials: ceftriaxone     Patient eligible for piperacillin-tazobactam to cefepime auto-substitution per P&T approved protocol? NO    Pertinent Laboratory Values: Wt Readings from Last 1 Encounters:   01/02/23 235 lb 10.8 oz (106.9 kg)     Temp Readings from Last 1 Encounters:   01/03/23 97.6 °F (36.4 °C) (Oral)     Recent Labs     01/01/23  0422 01/03/23  0412   BUN 20 22   CREATININE 0.90 0.90   WBC 5.4  --      Estimated Creatinine Clearance: 87 mL/min (based on SCr of 0.9 mg/dL). Lab Results   Component Value Date/Time    VANCOTROUGH 12.2 09/07/2022 09:15 AM    VANCORANDOM 16.2 09/01/2022 06:30 AM     MRSA Nasal Swab: N/A.  Non-respiratory infection    Assessment:  Date/Time Dose Concentration AUC         Note: Serum concentrations collected for AUC dosing may appear elevated if collected in close proximity to the dose administered, this is not necessarily an indication of toxicity    Plan:  Continue vancomycin 1000 mg q 12 hours  Anticipated AUC of 501 and trough concentration of 16.7 at steady state  Renal labs as indicated   Vancomycin concentration ordered for 1/4 @ 0400   Pharmacy will continue to monitor patient and adjust therapy as indicated    Thank you for the consult,  Shantel [facility]

## 2023-01-03 NOTE — H&P
[facility] Cardiology Initial Cardiac Evaluation                 Date of  Admission: 12/29/2022 10:02 AM     Primary Care Physician: Steven Beach MD  Primary Cardiologist: Dr Bethanne Cabot  Referring Physician: Dr Jessica Rubalcava  Attending Physician:  [facility] AT [place]    CC: bacteremia      Leonardo Rai is a 65 y.o. male admitted for Hypoxia [R09.02]  COPD exacerbation ([facility] [J44.1]  Congestive heart failure, unspecified HF chronicity, unspecified heart failure type ([facility] [I50.9]. He has a h/o CAD w multiple prior PCI, CHF and prior ICD implantation for ventricular tachycardia. He also has history of paroxysmal atrial fibrillation on Eliquis, diabetes mellitus, COPD and sleep apnea. He recent underwent kyphoplasty. He unfortunately has been found to have an abscess 8-2022. Echo was performed during this hospitalization which actually shows EF has improved to 40 to 45%. Presented to the ER 12-29 w SOB, hypoxic w O2 84% on RA, positive for flu and admitted for further management. BC positive for strep bacteremia and CoNS. DIRK showed EF 50-55% w mild MV calcification and mild TR. Seen by ID and concern for cardiac involvement. Pt states he feels 100% better since admission, no CP, SOB much improved now w rare cough. No F/C, dizziness, syncope or ICD shock. Patient Active Problem List   Diagnosis    H/O endarterectomy    Arterial degeneration    Atrial fibrillation (HCC)    Complicated wound infection    Allergic rhinitis    Elevated troponin    HTN (hypertension)    Pulmonary emphysema (HCC)    Atherosclerosis of native arteries of extremity with intermittent claudication (HCC)    Severe obesity (BMI 35.0-39. 9) with comorbidity ([facility]    COPD (chronic obstructive pulmonary disease) (HCC)    Personal history of tobacco use, presenting hazards to health    Intermittent spinal claudication (HCC)    Cigarette nicotine dependence in remission    Irregular heart beat    Acute metabolic encephalopathy    Chest pain    Hiatal hernia    Diabetic neuropathy (HCC)    Normocytic anemia    Chronic pain of right knee    Sleep apnea    Osteoarthritis    Encounter for long-term (current) drug use    Ischemic cardiomyopathy    Ventricular tachycardia    VT (ventricular tachycardia)    Coronary artery disease involving native coronary artery of native heart without angina pectoris    Benign neoplasm of colon    PAD (peripheral artery disease) (HCC)    Asthma    Orthostatic hypotension    Hyperlipidemia    S/P angioplasty with stent    DM (diabetes mellitus) type II, controlled, with peripheral vascular disorder (HCC)    Toe laceration    Obstructive sleep apnea    MINI (acute kidney injury) ([address]    Type 2 diabetes with nephropathy (HCC)    Hypoxia    Abnormal nuclear cardiac imaging test    PVC's (premature ventricular contractions)    Asbestos exposure    Sepsis ([address]    ICD (implantable cardioverter-defibrillator) in place    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, vertebra(e), init ([address]    General weakness    Acute cystitis without hematuria    Low back pain without sciatica    Back pain    DNI (do not intubate)    Elevated liver enzymes    Anemia    Hyponatremia    Physical debility    Lumbar discitis    Psoas muscle abscess ([address]    Non-pressure chronic ulcer of left heel and midfoot with fat layer exposed ([address]    Antiplatelet or antithrombotic long-term use    COPD exacerbation ([address]    Influenza       Past Medical History:   Diagnosis Date    Acute respiratory failure with hypoxia ([address] 10/23/2014    MINI (acute kidney injury) ([address] 09/15/2014    MINI (acute kidney injury) ([address]     MINI (acute kidney injury) ([address]     Allergic rhinitis 11/10/2015    Asthma 11/10/2015    Benign essential hypertension 11/10/2015    Benign neoplasm of colon 11/10/2015    CAD (coronary artery disease) 11/10/2015    CAD (coronary artery disease) 1998, 1999    mix2, 3 stents [id number]    CAP (community acquired pneumonia) 12/31/2020    Cardiomyopathy ([address] 46/62/5552    Complicated wound infection 11/10/2015    COPD (chronic obstructive pulmonary disease) with emphysema ([address] 11/10/2015    COPD exacerbation ([address] 10/12/2011    COVID-19 12/31/2020    Diabetes mellitus type 2, controlled ([address] 11/10/2015    doesn/t check daily oral meds, A1c 6.0 (8/10/22)    Diabetic neuropathy ([address] 11/10/2015    Disruption of wound, unspecified 10/09/2014    Encounter for long-term (current) use of other high-risk medications 11/10/2015    Extremity atherosclerosis with intermittent claudication ([address] 11/10/2015    H/O endarterectomy 11/10/2015    Hiatal hernia 11/10/2015    History of 2019 novel coronavirus disease (COVID-19)     12/31/2020- hospitalized    Hyperglycemia due to type 1 diabetes mellitus ([address] 11/10/2015    Hyperlipidemia 11/10/2015    ICD (implantable cardioverter-defibrillator) in place 06/16/2022    Monomorphic ventricular tachycardia 12/31/2020    Myocardial infarction ([address] 1999    Myocardial infarction ([address] 11/10/2015    Obesity 11/10/2015    Orthostatic hypotension 11/10/2015    Osteoarthritis 11/10/2015    Peripheral vascular disease ([address] 11/10/2015    PNA (pneumonia) 02/08/2019    PVC (premature ventricular contraction)     Rash 47/86/0884    Seroma complicating a procedure 10/02/2014    Sleep apnea     cpap    Toe laceration     Type 2 diabetes with nephropathy ([address]     Uncontrolled type 2 diabetes mellitus 11/10/2015    Vertiginous syndromes and other disorders of vestibular system 11/10/2015      Past Surgical History:   Procedure Laterality Date    CARDIAC CATHETERIZATION  12/05/2018    LV EF=40%. LM:nl. LAD:30-40% mid stenosis. LCX OM1:95% stenosis. RCA:100% occlusion at RV branch.     CATARACT REMOVAL Right 07/20/2022    CORONARY ANGIOPLASTY WITH STENT PLACEMENT  12/05/2018    LCX OM1:2.5 x 12 [place] RAKESH    CORONARY ANGIOPLASTY WITH STENT PLACEMENT  1999    [place] ABDOMEN SURGERY PROC UNLISTED  1960    abdominal cyst removed    [place] CARDIAC SURG PROCEDURE UNLIST 1999    stents x3    SPINE SURGERY N/A 2022    LUMBAR 2, LUMBAR 4 KYPHOPLASTY AND ANY OTHER INDICATED LEVELS performed by Marlena Mejía III, MD at [facility] MAIN OR    VASCULAR SURGERY  14    Repair of right common femoral artery     VASCULAR SURGERY  14    tiffanie femoral endarterectomy    VASCULAR SURGERY Left 10/28/2022    LEFT LOWER EXTREMITY ARTERIOGRAM / ULTRASOUND GUIDED ACCESS   performed by Marjan Pillai MD at [facility] MAIN OR     Allergies   Allergen Reactions    Acetaminophen Hives     Per spouse has small amount of hives, takes 1/2 and tolerates     Azithromycin Hives    Morphine Hallucinations     Muscle twitching. jerking    Oxaprozin Other (See Comments)     ELEVATED BLOOD PRESSURE    Oxycodone Anxiety      Family History   Problem Relation Age of Onset    Breast Cancer Mother     Hypertension Mother     Other Father         DIVERTICULITIS    Crohn's Disease Sister     Lung Disease Brother         mesothioloma    Diabetes Sister     Heart Attack Father     Heart Disease Father     Stroke Mother       Social History     Tobacco Use    Smoking status: Former     Packs/day: 1.00     Years: 50.00     Pack years: 50.00     Types: Cigarettes     Quit date: 10/2/2011     Years since quittin.2    Smokeless tobacco: Current     Types: Chew    Tobacco comments:     Chews tobacco daily   Substance Use Topics    Alcohol use:  Yes     Alcohol/week: 0.0 standard drinks        Current Facility-Administered Medications   Medication Dose Route Frequency    predniSONE (DELTASONE) tablet 40 mg  40 mg Oral Daily    vancomycin (VANCOCIN) 1,000 mg in sodium chloride 0.9 % 250 mL IVPB ([id number])  1,000 mg IntraVENous Q12H    cefTRIAXone (ROCEPHIN) 2,000 mg in sodium chloride 0.9 % 50 mL IVPB mini-bag  2,000 mg IntraVENous Q24H    sodium chloride flush 0.9 % injection 5-40 mL  5-40 mL IntraVENous 2 times per day    sodium chloride flush 0.9 % injection 5-40 mL  5-40 mL IntraVENous PRN    0.9 % sodium chloride infusion   IntraVENous PRN    ondansetron (ZOFRAN-ODT) disintegrating tablet 4 mg  4 mg Oral Q8H PRN    Or    ondansetron (ZOFRAN) injection 4 mg  4 mg IntraVENous Q6H PRN    polyethylene glycol (GLYCOLAX) packet 17 g  17 g Oral Daily PRN    amiodarone (CORDARONE) tablet 200 mg  200 mg Oral Daily    apixaban (ELIQUIS) tablet 5 mg  5 mg Oral Q12H    carvedilol (COREG) tablet 25 mg  25 mg Oral BID     clopidogrel (PLAVIX) tablet 75 mg  75 mg Oral Daily    latanoprost (XALATAN) 0.005 % ophthalmic solution 1 drop  1 drop Both Eyes Nightly    magnesium oxide (MAG-OX) tablet 400 mg  400 mg Oral Daily    montelukast (SINGULAIR) tablet 10 mg  10 mg Oral Nightly    rosuvastatin (CRESTOR) tablet 10 mg  10 mg Oral Nightly    [Held by provider] valsartan (DIOVAN) tablet 80 mg  80 mg Oral Daily    glucose chewable tablet 16 g  4 tablet Oral PRN    dextrose bolus 10% 125 mL  125 mL IntraVENous PRN    Or    dextrose bolus 10% 250 mL  250 mL IntraVENous PRN    glucagon (rDNA) injection 1 mg  1 mg SubCUTAneous PRN    dextrose 10 % infusion   IntraVENous Continuous PRN    insulin lispro (HUMALOG) injection vial 0-8 Units  0-8 Units SubCUTAneous TID WC    insulin lispro (HUMALOG) injection vial 0-4 Units  0-4 Units SubCUTAneous Nightly    albuterol (PROVENTIL) nebulizer solution 2.5 mg  2.5 mg Nebulization Q6H PRN    tiotropium (SPIRIVA RESPIMAT) 2.5 MCG/ACT inhaler 2 puff  2 puff Inhalation Daily    potassium chloride (KLOR-CON M) extended release tablet 40 mEq  40 mEq Oral Once    furosemide (LASIX) tablet 40 mg  40 mg Oral Daily       Review of Symptoms:  General: no weight change,  + weakness, fever or chills  Skin: no rashes, lumps, or other skin changes  HEENT: no headache, dizziness, lightheadedness, vision changes, hearing changes, tinnitus, vertigo, sinus pressure/pain, bleeding gums, sore throat, or hoarseness  Neck: no swollen glands, goiter, pain or stiffness  Respiratory: no cough, sputum, hemoptysis, + dyspnea, wheezing  Cardiovascular: + as per HPI  Gastrointestinal: no GERD, constipation, diarrhea, liver problems, or h/o GI bleed  Urinary: no frequency, urgency , hematuria, burning/pain with urination, recent flank pain, polyuria, nocturia, or difficulty urinating  Peripheral Vascular: no claudication, leg cramps, prior DVTs, swelling of calves, legs, or feet, color change, or swelling with redness or tenderness  Musculoskeletal: no muscle or joint pain/stiffness, joint swelling, erythema of joints, or back pain  Psychiatric: no depression or excessive stress  Neurological: no sensory or motor loss, seizures, syncope, tremors, numbness, no dementia  Hematologic: no anemia, easy bruising or bleeding  Endocrine: no thyroid problems, heat or cold intolerance, excessive sweating, polyuria, polydipsia, +  diabetes.        Physical Exam  Vitals:    01/03/23 0716 01/03/23 0904 01/03/23 1129 01/03/23 1522   BP: (!) 155/89  111/65 131/66   Pulse: 76 60 68 64   Resp: 18 17 18 18   Temp: 97.6 °F (36.4 °C)  98.2 °F (36.8 °C) 98.6 °F (37 °C)   TempSrc: Oral  Oral Oral   SpO2: 94% 94% 92% 93%   Weight:       Height:           Physical Exam:  General: Well Developed, Well Nourished, No Acute Distress, RA  Head: normocephalic atraumatic   ENT: pupils equal and round, no abnormalities noted  Neck: supple, no JVD, no carotid bruits  Heart: S1S2 with RRR, ICD in chest wall   Lungs: Clear throughout auscultation bilaterally without adventitious sounds  Abd: soft, nontender, nondistended, with good bowel sounds  Ext: warm, trace edema  Skin: warm and dry  Psychiatric: Normal mood and affect, A&O x 3  Neurologic: normal muscle tone      Labs:   Recent Labs     01/01/23  0422 01/03/23  0412    138   K 3.6 3.5   MG  --  2.0   BUN 20 22   WBC 5.4  --    HGB 10.0*  --    HCT 30.6*  --      --         Assessment/Plan:     Assessment:   Influenza/ COPD exacebation  - tamiflu, decadron     Bacteremia  - DIRK In AM   - rocephin Atrial fibrillation ([facility]  - eliquis    SHF w h/o VT  - amio  Coreg, lasix po, diovan     CAD  - ASA, plavix, BB, crestor      DM (diabetes mellitus) type II, controlled, with peripheral vascular disorder ([facility]  - per primary       Thank you very much for this referral. We appreciate the opportunity to participate in this patient's care. We will follow along with above stated plan. Eliot Chu  Consulting MD: PATIENT’S [facility] CARDIOLOGY  [address] [place] [address], [facility] [address]  PHONE: [phone]        23      NAME:  Mehdi Khanna  : 1945  MRN: [id number]      SUBJECTIVE:   Mehdi Khanna is a 65 y.o. male seen for a consultation visit regarding the following:     Chief Complaint   Patient presents with    Shortness of Breath            HPI:    79-year-old gentleman with back surgery complicated by an abscess complicated by bacteremia. Patient has had no active cardiac symptoms. Specifically has no chest pain shortness of breath orthopnea PND palpitations or syncope.   Consult requested for DIRK    Hospital Problems             Last Modified POA    * (Principal) COPD exacerbation ([facility] 2022 Yes    ICD (implantable cardioverter-defibrillator) in place 2022 Yes    Influenza 2022 Yes    Atrial fibrillation ([address] 2022 Yes    COPD (chronic obstructive pulmonary disease) ([facility] 2022 Yes    DM (diabetes mellitus) type II, controlled, with peripheral vascular disorder ([facility] 2022 Yes     Allergies   Allergen Reactions    Acetaminophen Hives     Per spouse has small amount of hives, takes 1/2 and tolerates     Azithromycin Hives    Morphine Hallucinations     Muscle twitching. jerking    Oxaprozin Other (See Comments)     ELEVATED BLOOD PRESSURE    Oxycodone Anxiety     Past Medical History:   Diagnosis Date    Acute respiratory failure with hypoxia ([address] 10/23/2014    MINI (acute kidney injury) ([address] 09/15/2014    MINI (acute kidney injury) ([address]     MINI (acute kidney injury) ([address]     Allergic rhinitis 11/10/2015    Asthma 11/10/2015    Benign essential hypertension 11/10/2015    Benign neoplasm of colon 11/10/2015    CAD (coronary artery disease) 11/10/2015    CAD (coronary artery disease) 1998, 1999    mix2, 3 stents [id number]    CAP (community acquired pneumonia) 12/31/2020    Cardiomyopathy ([address] 84/91/2251    Complicated wound infection 11/10/2015    COPD (chronic obstructive pulmonary disease) with emphysema ([address] 11/10/2015    COPD exacerbation ([address] 10/12/2011    COVID-19 12/31/2020    Diabetes mellitus type 2, controlled ([address] 11/10/2015    doesn/t check daily oral meds, A1c 6.0 (8/10/22)    Diabetic neuropathy ([address] 11/10/2015    Disruption of wound, unspecified 10/09/2014    Encounter for long-term (current) use of other high-risk medications 11/10/2015    Extremity atherosclerosis with intermittent claudication ([address] 11/10/2015    H/O endarterectomy 11/10/2015    Hiatal hernia 11/10/2015    History of 2019 novel coronavirus disease (COVID-19)     12/31/2020- hospitalized    Hyperglycemia due to type 1 diabetes mellitus ([address] 11/10/2015    Hyperlipidemia 11/10/2015    ICD (implantable cardioverter-defibrillator) in place 06/16/2022    Monomorphic ventricular tachycardia 12/31/2020    Myocardial infarction ([address] 1999    Myocardial infarction ([address] 11/10/2015    Obesity 11/10/2015    Orthostatic hypotension 11/10/2015    Osteoarthritis 11/10/2015    Peripheral vascular disease ([address] 11/10/2015    PNA (pneumonia) 02/08/2019    PVC (premature ventricular contraction)     Rash 78/80/3470    Seroma complicating a procedure 10/02/2014    Sleep apnea     cpap    Toe laceration     Type 2 diabetes with nephropathy ([address]     Uncontrolled type 2 diabetes mellitus 11/10/2015    Vertiginous syndromes and other disorders of vestibular system 11/10/2015     Past Surgical History:   Procedure Laterality Date    CARDIAC CATHETERIZATION 2018    LV EF=40%. LM:nl. LAD:30-40% mid stenosis. LCX OM1:95% stenosis. RCA:100% occlusion at RV branch. CATARACT REMOVAL Right 2022    CORONARY ANGIOPLASTY WITH STENT PLACEMENT  2018    LCX OM1:2.5 x 12 [place] RAKESH    CORONARY ANGIOPLASTY WITH STENT PLACEMENT      [place] ABDOMEN SURGERY PROC UNLISTED      abdominal cyst removed    [place] CARDIAC SURG PROCEDURE UNLIST      stents x3    SPINE SURGERY N/A 2022    LUMBAR 2, LUMBAR 4 KYPHOPLASTY AND ANY OTHER INDICATED LEVELS performed by Yaritza Bernal III, MD at [facility] MAIN OR    VASCULAR SURGERY  14    Repair of right common femoral artery     VASCULAR SURGERY  14    tiffanie femoral endarterectomy    VASCULAR SURGERY Left 10/28/2022    LEFT LOWER EXTREMITY ARTERIOGRAM / ULTRASOUND GUIDED ACCESS   performed by Stacy Vera MD at [facility] MAIN OR     Family History   Problem Relation Age of Onset    Breast Cancer Mother     Hypertension Mother     Other Father         DIVERTICULITIS    Crohn's Disease Sister     Lung Disease Brother         mesothioloma    Diabetes Sister     Heart Attack Father     Heart Disease Father     Stroke Mother      Social History     Tobacco Use    Smoking status: Former     Packs/day: 1.00     Years: 50.00     Pack years: 50.00     Types: Cigarettes     Quit date: 10/2/2011     Years since quittin.2    Smokeless tobacco: Current     Types: Chew    Tobacco comments:     Chews tobacco daily   Substance Use Topics    Alcohol use: Yes     Alcohol/week: 0.0 standard drinks           ROS:    Constitution: Negative for fever. Eyes: Negative for blurred vision. Respiratory: Negative for cough. Endocrine: Negative for cold intolerance and heat intolerance. Skin: Negative for rash. Musculoskeletal: Negative for myalgias. Gastrointestinal: Negative for diarrhea, nausea and vomiting. Genitourinary: Negative for dysuria. Neurological: Negative for headaches and numbness.           PHYSICAL EXAM:     /66   Pulse 64   Temp 98.6 °F (37 °C) (Oral)   Resp 18   Ht 6' 1\" (1.854 m)   Wt 235 lb 10.8 oz (106.9 kg)   SpO2 93%   BMI 31.09 kg/m²    Constitutional: Oriented to person, place, and time. Appears well-developed and well-nourished. Head: Normocephalic and atraumatic. Neck: Neck supple. Cardiovascular: Normal rate and regular rhythm with no murmur -No JVP  Pulmonary/Chest: Breath sounds normal.   Abdominal: Soft. Musculoskeletal: No edema. Neurological: Alert and oriented to person, place, and time. Skin: Skin is warm and dry. Psychiatric: Normal mood and affect. Vitals reviewed        Medical problems and test results were reviewed with the patient today.      Wt Readings from Last 3 Encounters:   01/02/23 235 lb 10.8 oz (106.9 kg)   12/29/22 225 lb (102.1 kg)   12/15/22 224 lb (101.6 kg)          Recent Results (from the past 672 hour(s))   CBC with Auto Differential    Collection Time: 12/29/22 10:18 AM   Result Value Ref Range    WBC 7.4 4.3 - 11.1 K/uL    RBC 3.23 (L) 4.23 - 5.6 M/uL    Hemoglobin 10.0 (L) 13.6 - 17.2 g/dL    Hematocrit 29.5 (L) 41.1 - 50.3 %    MCV 91.3 82 - 102 FL    MCH 31.0 26.1 - 32.9 PG    MCHC 33.9 31.4 - 35.0 g/dL    RDW 16.3 (H) 11.9 - 14.6 %    Platelets 685 449 - 558 K/uL    MPV 10.1 9.4 - 12.3 FL    nRBC 0.00 0.0 - 0.2 K/uL    Differential Type AUTOMATED      Seg Neutrophils 79 (H) 43 - 78 %    Lymphocytes 9 (L) 13 - 44 %    Monocytes 11 4.0 - 12.0 %    Eosinophils % 0 (L) 0.5 - 7.8 %    Basophils 0 0.0 - 2.0 %    Immature Granulocytes 1 0.0 - 5.0 %    Segs Absolute 5.9 1.7 - 8.2 K/UL    Absolute Lymph # 0.6 0.5 - 4.6 K/UL    Absolute Mono # 0.8 0.1 - 1.3 K/UL    Absolute Eos # 0.0 0.0 - 0.8 K/UL    Basophils Absolute 0.0 0.0 - 0.2 K/UL    Absolute Immature Granulocyte 0.1 0.0 - 0.5 K/UL   CMP    Collection Time: 12/29/22 10:18 AM   Result Value Ref Range    Sodium 138 133 - 143 mmol/L    Potassium 3.1 (L) 3.5 - 5.1 mmol/L    Chloride 101 101 - 110 mmol/L    CO2 29 21 - 32 mmol/L    Anion Gap 8 2 - 11 mmol/L    Glucose 88 65 - 100 mg/dL    BUN 13 8 - 23 MG/DL    Creatinine 0.80 0.8 - 1.5 MG/DL    Est, Glom Filt Rate >60 >60 ml/min/1.73m2    Calcium 8.7 8.3 - 10.4 MG/DL    Total Bilirubin 0.7 0.2 - 1.1 MG/DL    ALT 46 12 - 65 U/L    AST 25 15 - 37 U/L    Alk Phosphatase 69 50 - 136 U/L    Total Protein 6.6 6.3 - 8.2 g/dL    Albumin 2.9 (L) 3.2 - 4.6 g/dL    Globulin 3.7 2.8 - 4.5 g/dL    Albumin/Globulin Ratio 0.8 0.4 - 1.6     COVID-19, Rapid    Collection Time: 12/29/22 10:18 AM    Specimen: Nasopharyngeal   Result Value Ref Range    Source NASAL      SARS-CoV-2, Rapid Not detected NOTD     Rapid influenza A/B antigens    Collection Time: 12/29/22 10:18 AM    Specimen: Nasal Washing   Result Value Ref Range    Influenza A Ag Positive (A) NEG      Influenza B Ag Negative NEG      Source Nasopharyngeal     Blood Culture 1    Collection Time: 12/29/22 10:18 AM    Specimen: Blood   Result Value Ref Range    Special Requests LEFT  FOREARM        Gram stain GRAM POSITIVE COCCI      Gram stain AEROBIC AND ANAEROBIC BOTTLES      Gram stain        RESULTS VERIFIED, PHONED TO AND READ BACK BY MARCELINO CASTILLO RN @ 5352 ON 12.30.22 YL    Culture (A)       STREPTOCOCCUS GALLOLYTICUS SSP PASTEURIANUS /INFANTARIUS This organism may be indicative of culture contamination. Clinical correlation needs to be evaluated as each case is unique.     Culture Refer to Blood Culture ID Panel Accession [id number]         Susceptibility    Streptococcus gallolyticus ssp pasteurianus - BACTERIAL SUSCEPTIBILITY PANEL GIOVANNY     erythromycin 0.0625 Sensitive ug/mL     Penicillin G 0.0625 Sensitive ug/mL     clindamycin 0.5 Intermediate ug/mL     vancomycin <=0.25 Sensitive ug/mL     cefepime <=0.0625 Sensitive ug/mL     cefTRIAXone 0.125 Sensitive ug/mL     amoxicillin <=0.25 Sensitive ug/mL   Lactate, Sepsis    Collection Time: 12/29/22 10:18 AM   Result Value Ref Range    Lactic Acid, Sepsis 1.1 0.4 - 2.0 MMOL/L   Brain Natriuretic Peptide    Collection Time: 12/29/22 10:18 AM   Result Value Ref Range    NT Pro-BNP 10,883 (H) <450 PG/ML   Troponin    Collection Time: 12/29/22 10:18 AM   Result Value Ref Range    Troponin, High Sensitivity 51.7 (H) 0 - 14 pg/mL   Hemoglobin A1c    Collection Time: 12/29/22 10:18 AM   Result Value Ref Range    Hemoglobin A1C 5.6 4.8 - 5.6 %    eAG 114 mg/dL   Culture, Blood, PCR ID Panel    Collection Time: 12/29/22 10:18 AM    Specimen: Blood   Result Value Ref Range    Accession Number [id number]     Enterococcus faecalis by PCR NOT DETECTED NOTDET      Enterococcus faecium by PCR NOT DETECTED NOTDET      Listeria monocytogenes by PCR NOT DETECTED NOTDET      STAPHYLOCOCCUS NOT DETECTED NOTDET      Staphylococcus Aureus NOT DETECTED NOTDET      Staphylococcus epidermidis by PCR NOT DETECTED NOTDET      Staphylococcus lugdunensis by PCR NOT DETECTED NOTDET      STREPTOCOCCUS Detected (A) NOTDET      Streptococcus agalactiae (Group B) NOT DETECTED NOTDET      Strep pneumoniae NOT DETECTED NOTDET      Strep pyogenes,(Grp. A) NOT DETECTED NOTDET      Acinetobacter calcoac baumannii complex by PCR NOT DETECTED NOTDET      Bacteroides fragilis by PCR NOT DETECTED NOTDET      Enterobacteriaceae by PCR NOT DETECTED NOTDET      Enterobacter cloacae complex by PCR NOT DETECTED NOTDET      Escherichia Coli NOT DETECTED NOTDET      Klebsiella aerogenes by PCR NOT DETECTED NOTDET      Klebsiella oxytoca by PCR NOT DETECTED NOTDET      Klebsiella pneumoniae group by PCR NOT DETECTED NOTDET      Proteus by PCR NOT DETECTED NOTDET      Salmonella species by PCR NOT DETECTED NOTDET      Serratia marcescens by PCR NOT DETECTED NOTDET      Haemophilus Influenzae by PCR NOT DETECTED NOTDET      Neisseria meningitidis by PCR NOT DETECTED NOTDET      Pseudomonas aeruginosa NOT DETECTED NOTDET      Stenotrophomonas maltophilia by PCR NOT DETECTED NOTDET      Candida albicans by PCR NOT DETECTED NOTDET      Candida auris by PCR NOT DETECTED NOTDET      Candida glabrata NOT DETECTED NOTDET      Candida krusei by PCR NOT DETECTED NOTDET      Candida parapsilosis by PCR NOT DETECTED NOTDET      Candida tropicalis by PCR NOT DETECTED NOTDET      Cryptococcus neoformans/gattii by PCR NOT DETECTED NOTDET      Resistant gene targets          [employer] test comment        False positive results may rarely occur.  Correlate with clinical,epidemiologic, and other laboratory findings   EKG 12 Lead    Collection Time: 12/29/22 11:12 AM   Result Value Ref Range    Ventricular Rate 61 BPM    Atrial Rate 61 BPM    P-R Interval 210 ms    QRS Duration 100 ms    Q-T Interval 488 ms    QTc Calculation (Bazett) 491 ms    P Axis 67 degrees    R Axis 90 degrees    T Axis 95 degrees    Diagnosis Atrial-paced rhythm with prolonged AV conduction    POCT Urinalysis no Micro    Collection Time: 12/29/22 12:26 PM   Result Value Ref Range    Specific Gravity, Urine, POC >1.030 (H) 1.001 - 1.023    pH, Urine, POC 5.5 5.0 - 9.0      Protein, Urine,  (A) NEG mg/dL    Glucose, UA POC Negative NEG mg/dL    Ketones, Urine, POC Negative NEG mg/dL    Bilirubin, Urine, POC Negative NEG      Blood, UA POC Negative NEG      URINE UROBILINOGEN POC 1.0 0.2 - 1.0 EU/dL    Nitrate, Urine, POC Negative NEG      Leukocyte Est, UA POC Negative NEG      Performed by: Zane Soriano    Troponin    Collection Time: 12/29/22 12:59 PM   Result Value Ref Range    Troponin, High Sensitivity 44.8 (H) 0 - 14 pg/mL   Procalcitonin    Collection Time: 12/29/22 12:59 PM   Result Value Ref Range    Procalcitonin <0.05 0.00 - 0.49 ng/mL   TSH with Reflex    Collection Time: 12/29/22 12:59 PM   Result Value Ref Range    TSH w Free Thyroid if Abnormal 0.34 (L) 0.358 - 3.740 UIU/ML   Vitamin B12    Collection Time: 12/29/22 12:59 PM   Result Value Ref Range    Vitamin B-12 957 193 - 986 pg/mL   T4, Free    Collection Time: 12/29/22 12:59 PM   Result Value Ref Range    T4 Free 1.9 (H) 0.78 - 1.46 NG/DL   POCT Glucose    Collection Time: 12/29/22  1:41 PM   Result Value Ref Range    POC Glucose 106 (H) 65 - 100 mg/dL    Performed by: Jose Luis    Arterial Blood Gas, POC    Collection Time: 12/29/22  3:22 PM   Result Value Ref Range    DEVICE NASAL CANNULA      pH, Arterial, POC 7.44 7.35 - 7.45      pCO2, Arterial, POC 46.4 (H) 35 - 45 MMHG    pO2, Arterial, POC 70 (L) 75 - 100 MMHG    HCO3, Mixed 31.3 (H) 22 - 26 MMOL/L    SO2c, Arterial, POC 94.1 (L) 95 - 98 %    Base Excess 6.2 mmol/L    POC Bill's Test Positive      Site RIGHT RADIAL      Specimen type: ARTERIAL      Performed by: RavindraCRT     FIO2 2     POCT Glucose    Collection Time: 12/29/22  5:01 PM   Result Value Ref Range    POC Glucose 134 (H) 65 - 100 mg/dL    Performed by: Coral    Blood Culture 2    Collection Time: 12/29/22  6:31 PM    Specimen: Blood   Result Value Ref Range    Special Requests LEFT  Antecubital        Gram stain GRAM POSITIVE COCCI      Gram stain AEROBIC BOTTLE POSITIVE      Gram stain        CRITICAL RESULT NOT CALLED DUE TO PREVIOUS NOTIFICATION OF CRITICAL RESULT WITHIN THE LAST 24 HOURS.     Culture (A)       STAPHYLOCOCCUS SPECIES, COAGULASE NEGATIVE IDENTIFICATION AND SUSCEPTIBILITY TO FOLLOW    Culture CULTURE IN PROGRESS,FURTHER UPDATES TO FOLLOW     Ammonia    Collection Time: 12/29/22  6:31 PM   Result Value Ref Range    Ammonia <10 (L) 11 - 32 UMOL/L   POCT Glucose    Collection Time: 12/29/22  9:22 PM   Result Value Ref Range    POC Glucose 203 (H) 65 - 100 mg/dL    Performed by: Joshua    CBC with Auto Differential    Collection Time: 12/30/22  5:22 AM   Result Value Ref Range    WBC 5.1 4.3 - 11.1 K/uL    RBC 3.43 (L) 4.23 - 5.6 M/uL    Hemoglobin 10.6 (L) 13.6 - 17.2 g/dL    Hematocrit 31.9 (L) 41.1 - 50.3 %    MCV 93.0 82 - 102 FL    MCH 30.9 26.1 - 32.9 PG    MCHC 33.2 31.4 - 35.0 g/dL    RDW 15.9 (H) 11.9 - 14.6 %    Platelets 197 150 - 450 K/uL    MPV 9.8 9.4 - 12.3 FL    nRBC 0.00 0.0 - 0.2 K/uL    Differential Type AUTOMATED      Seg Neutrophils 83 (H) 43 - 78 %    Lymphocytes 8 (L) 13 - 44 %    Monocytes 8 4.0 - 12.0 %    Eosinophils % 0 (L) 0.5 - 7.8 %    Basophils 0 0.0 - 2.0 %    Immature Granulocytes 1 0.0 - 5.0 %    Segs Absolute 4.2 1.7 - 8.2 K/UL    Absolute Lymph # 0.4 (L) 0.5 - 4.6 K/UL    Absolute Mono # 0.4 0.1 - 1.3 K/UL    Absolute Eos # 0.0 0.0 - 0.8 K/UL    Basophils Absolute 0.0 0.0 - 0.2 K/UL    Absolute Immature Granulocyte 0.1 0.0 - 0.5 K/UL   Basic Metabolic Panel    Collection Time: 12/30/22  5:22 AM   Result Value Ref Range    Sodium 139 133 - 143 mmol/L    Potassium 3.6 3.5 - 5.1 mmol/L    Chloride 100 (L) 101 - 110 mmol/L    CO2 34 (H) 21 - 32 mmol/L    Anion Gap 5 2 - 11 mmol/L    Glucose 193 (H) 65 - 100 mg/dL    BUN 15 8 - 23 MG/DL    Creatinine 0.80 0.8 - 1.5 MG/DL    Est, Glom Filt Rate >60 >60 ml/min/1.73m2    Calcium 8.9 8.3 - 10.4 MG/DL   POCT Glucose    Collection Time: 12/30/22  7:18 AM   Result Value Ref Range    POC Glucose 198 (H) 65 - 100 mg/dL    Performed by: Mone    POCT Glucose    Collection Time: 12/30/22 11:03 AM   Result Value Ref Range    POC Glucose 273 (H) 65 - 100 mg/dL    Performed by: Jesusita Franklin    Transthoracic echocardiogram (TTE) complete with contrast, bubble, strain, and 3D PRN    Collection Time: 12/30/22  3:30 PM   Result Value Ref Range    LV EDV A2C 139 mL    LV EDV A4C 194 mL    LV ESV A2C 76 mL    LV ESV A4C 93 mL    IVSd 1.1 (A) 0.6 - 1.0 cm    LVIDd 6.0 (A) 4.2 - 5.9 cm    LVIDs 4.5 cm    LVOT Diameter 2.4 cm    LVOT Mean Gradient 2 mmHg    LVOT VTI 20.3 cm    LVOT Peak Velocity 1.0 m/s    LVOT Peak Gradient 4 mmHg    LVPWd 1.1 (A) 0.6 - 1.0 cm    LV E' Lateral Velocity 10 cm/s    LV E' Septal Velocity 7 cm/s    LV Ejection Fraction A2C 45 %    LV Ejection Fraction A4C 52 %    EF BP 49 (A) 55 - 100 %    LVOT Area 4.5 cm2    LVOT SV 91.8 ml    LA Minor Axis 6.1 cm    LA Major [place] 6.3 cm    LA Area 2C 25.1 cm2    LA Area 4C 24.4 cm2    LA Volume 2C 83 (A) 18 - 58 mL    LA Volume 4C 80 (A) 18 - 58 mL    LA Volume BP 82 (A) 18 - 58 mL    LA Diameter 4.6 cm    AV Mean Velocity 1.1 m/s    AV Mean Gradient 6 mmHg    AV VTI 36.4 cm    AV Peak Velocity 1.7 m/s    AV Peak Gradient 12 mmHg    AV Area by VTI 2.5 cm2    AV Area by Peak Velocity 2.5 cm2    Aortic Root 3.5 cm    Ascending Aorta 3.5 cm    IVC Proxmal 2.7 cm    MV E Wave Deceleration Time 218.0 ms    MV A Velocity 1.14 m/s    MV E Velocity 1.07 m/s    MV Mean Gradient 2 mmHg    MV VTI 41.8 cm    MV Mean Velocity 0.7 m/s    MV Max Velocity 1.2 m/s    MV Peak Gradient 6 mmHg    MV Area by VTI 2.2 cm2    RV Basal Dimension 4.8 cm    RV Mid Dimension 3.3 cm    RV Free Wall Peak S' 18 cm/s    TAPSE 3.0 1.7 cm    Body Surface Area 2.29 m2    Fractional Shortening 2D 25 28 - 44 %    LV ESV Index A4C 41 mL/m2    LV EDV Index A4C 86 mL/m2    LV ESV Index A2C 34 mL/m2    LV EDV Index A2C 62 mL/m2    LVIDd Index 2.65 cm/m2    LVIDs Index 1.99 cm/m2    LV RWT Ratio 0.37     LV Mass 2D 279.6 (A) 88 - 224 g    LV Mass 2D Index 123.7 (A) 49 - 115 g/m2    MV E/A 0.94     E/E' Ratio (Averaged) 12.99     E/E' Lateral 10.70     E/E' Septal 15.29     LA Volume Index BP 36 (A) 16 - 34 ml/m2    LVOT Stroke Volume Index 40.6 mL/m2    LA Volume Index 2C 37 (A) 16 - 34 mL/m2    LA Volume Index 4C 35 (A) 16 - 34 mL/m2    LA Size Index 2.04 cm/m2    LA/AO Root Ratio 1.31     Ao Root Index 1.55 cm/m2    Ascending Aorta Index 1.55 cm/m2    AV Velocity Ratio 0.59     LVOT:AV VTI Index 0.56     CORI/BSA VTI 1.1 cm2/m2    CORI/BSA Peak Velocity 1.1 cm2/m2    MV:LVOT VTI Index 2.06    Culture, Blood 1    Collection Time: 12/30/22  4:03 PM    Specimen: Blood   Result Value Ref Range    Special Requests RIGHT  HAND        Culture NO GROWTH 4 DAYS     POCT Glucose    Collection Time: 12/30/22  4:34 PM   Result Value Ref Range    POC Glucose 266 (H) 65 - 100 mg/dL    Performed by: Mone    POCT Glucose    Collection Time: 12/30/22  8:23 PM   Result Value Ref Range    POC Glucose 305 (H) 65 - 100 mg/dL    Performed by: Blessing    CBC with Auto Differential    Collection Time: 12/31/22  3:47 AM   Result Value Ref Range    WBC 4.7 4.3 - 11.1 K/uL    RBC 3.23 (L) 4.23 - 5.6 M/uL    Hemoglobin 9.9 (L) 13.6 - 17.2 g/dL    Hematocrit 29.9 (L) 41.1 - 50.3 %    MCV 92.6 82 - 102 FL    MCH 30.7 26.1 - 32.9 PG    MCHC 33.1 31.4 - 35.0 g/dL    RDW 15.5 (H) 11.9 - 14.6 %    Platelets 197 139 - 909 K/uL    MPV 11.1 9.4 - 12.3 FL    nRBC 0.00 0.0 - 0.2 K/uL    Differential Type AUTOMATED      Seg Neutrophils 79 (H) 43 - 78 %    Lymphocytes 10 (L) 13 - 44 %    Monocytes 10 4.0 - 12.0 %    Eosinophils % 0 (L) 0.5 - 7.8 %    Basophils 0 0.0 - 2.0 %    Immature Granulocytes 1 0.0 - 5.0 %    Segs Absolute 3.7 1.7 - 8.2 K/UL    Absolute Lymph # 0.5 0.5 - 4.6 K/UL    Absolute Mono # 0.5 0.1 - 1.3 K/UL    Absolute Eos # 0.0 0.0 - 0.8 K/UL    Basophils Absolute 0.0 0.0 - 0.2 K/UL    Absolute Immature Granulocyte 0.1 0.0 - 0.5 K/UL   Basic Metabolic Panel    Collection Time: 12/31/22  3:47 AM   Result Value Ref Range    Sodium 138 133 - 143 mmol/L    Potassium 3.2 (L) 3.5 - 5.1 mmol/L    Chloride 100 (L) 101 - 110 mmol/L    CO2 32 21 - 32 mmol/L    Anion Gap 6 2 - 11 mmol/L    Glucose 245 (H) 65 - 100 mg/dL    BUN 19 8 - 23 MG/DL    Creatinine 1.00 0.8 - 1.5 MG/DL    Est, Glom Filt Rate >60 >60 ml/min/1.73m2    Calcium 8.6 8.3 - 10.4 MG/DL   POCT Glucose    Collection Time: 12/31/22  7:50 AM   Result Value Ref Range    POC Glucose 229 (H) 65 - 100 mg/dL    Performed by: EASE TechnologiesArielGURINDER    POCT Glucose    Collection Time: 12/31/22 11:21 AM   Result Value Ref Range    POC Glucose 342 (H) 65 - 100 mg/dL    Performed by: EASE TechnologiesArielGURINDER    POCT Glucose    Collection Time: 12/31/22  4:12 PM   Result Value Ref Range    POC Glucose 354 (H) 65 - 100 mg/dL    Performed by: Leti    POCT Glucose    Collection Time: 12/31/22  8:33 PM   Result Value Ref Range    POC Glucose 306 (H) 65 - 100 mg/dL    Performed by: Saul    CBC with Auto Differential    Collection Time: 01/01/23  4:22 AM   Result Value Ref Range    WBC 5.4 4.3 - 11.1 K/uL    RBC 3.34 (L) 4.23 - 5.6 M/uL    Hemoglobin 10.0 (L) 13.6 - 17.2 g/dL    Hematocrit 30.6 (L) 41.1 - 50.3 %    MCV 91.6 82 - 102 FL    MCH 29.9 26.1 - 32.9 PG    MCHC 32.7 31.4 - 35.0 g/dL    RDW 15.4 (H) 11.9 - 14.6 %    Platelets 540 024 - 999 K/uL    MPV 11.2 9.4 - 12.3 FL    nRBC 0.00 0.0 - 0.2 K/uL    Seg Neutrophils 79 (H) 43 - 78 %    Lymphocytes 13 13 - 44 %    Monocytes 7 4.0 - 12.0 %    Eosinophils % 0 (L) 0.5 - 7.8 %    Basophils 0 0.0 - 2.0 %    Immature Granulocytes 1 0.0 - 5.0 %    Segs Absolute 4.2 1.7 - 8.2 K/UL    Absolute Lymph # 0.7 0.5 - 4.6 K/UL    Absolute Mono # 0.4 0.1 - 1.3 K/UL    Absolute Eos # 0.0 0.0 - 0.8 K/UL    Basophils Absolute 0.0 0.0 - 0.2 K/UL    Absolute Immature Granulocyte 0.1 0.0 - 0.5 K/UL    RBC Comment SLIGHT  ANISOCYTOSIS + POIKILOCYTOSIS        RBC Comment OCCASIONAL  OVALOCYTES        WBC Comment Result Confirmed By Smear      Platelet Comment ADEQUATE      Differential Type AUTOMATED     Basic Metabolic Panel    Collection Time: 01/01/23  4:22 AM   Result Value Ref Range    Sodium 138 133 - 143 mmol/L    Potassium 3.6 3.5 - 5.1 mmol/L    Chloride 101 101 - 110 mmol/L    CO2 33 (H) 21 - 32 mmol/L    Anion Gap 4 2 - 11 mmol/L    Glucose 243 (H) 65 - 100 mg/dL    BUN 20 8 - 23 MG/DL    Creatinine 0.90 0.8 - 1.5 MG/DL    Est, Glom Filt Rate >60 >60 ml/min/1.73m2    Calcium 8.6 8.3 - 10.4 MG/DL   POCT Glucose    Collection Time: 01/01/23  8:17 AM   Result Value Ref Range    POC Glucose 235 (H) 65 - 100 mg/dL    Performed by: Shen    POCT Glucose    Collection Time: 01/01/23 11:27 AM   Result Value Ref Range    POC Glucose 322 (H) 65 - 100 mg/dL    Performed by: IraisCT    POCT Glucose    Collection Time: 01/01/23  4:08 PM   Result Value Ref Range    POC Glucose 342 (H) 65 - 100 mg/dL    Performed by: Shen    POCT Glucose    Collection Time: 01/01/23  8:40 PM   Result Value Ref Range    POC Glucose 280 (H) 65 - 100 mg/dL    Performed by: Pino    POCT Glucose    Collection Time: 01/02/23  8:14 AM   Result Value Ref Range    POC Glucose 320 (H) 65 - 100 mg/dL    Performed by: Shen    Culture, Blood 1    Collection Time: 01/02/23 11:23 AM    Specimen: Blood   Result Value Ref Range    Special Requests RIGHT  Antecubital        Culture NO GROWTH AFTER 20 HOURS     Culture, Blood 1    Collection Time: 01/02/23 11:29 AM    Specimen: Blood   Result Value Ref Range    Special Requests RIGHT  FOREARM        Culture NO GROWTH AFTER 20 HOURS     POCT Glucose    Collection Time: 01/02/23 12:28 PM   Result Value Ref Range    POC Glucose 327 (H) 65 - 100 mg/dL    Performed by: IraisCT    POCT Glucose    Collection Time: 01/02/23  4:19 PM   Result Value Ref Range    POC Glucose 335 (H) 65 - 100 mg/dL    Performed by: Shen    POCT Glucose    Collection Time: 01/02/23  9:02 PM   Result Value Ref Range    POC Glucose 314 (H) 65 - 100 mg/dL    Performed by: Shahida Current    Basic Metabolic Panel w/ Reflex to MG    Collection Time: 01/03/23  4:12 AM   Result Value Ref Range    Sodium 138 133 - 143 mmol/L    Potassium 3.5 3.5 - 5.1 mmol/L    Chloride 102 101 - 110 mmol/L    CO2 35 (H) 21 - 32 mmol/L    Anion Gap 1 (L) 2 - 11 mmol/L    Glucose 181 (H) 65 - 100 mg/dL    BUN 22 8 - 23 MG/DL    Creatinine 0.90 0.8 - 1.5 MG/DL    Est, Glom Filt Rate >60 >60 ml/min/1.73m2    Calcium 8.3 8.3 - 10.4 MG/DL   Magnesium    Collection Time: 01/03/23  4:12 AM   Result Value Ref Range    Magnesium 2.0 1.8 - 2.4 mg/dL   POCT Glucose    Collection Time: 01/03/23  7:17 AM   Result Value Ref Range    POC Glucose 151 (H) 65 - 100 mg/dL    Performed by: MalcoHomeZadarganPCT    POCT Glucose    Collection Time: 01/03/23 11:30 AM   Result Value Ref Range    POC Glucose 204 (H) 65 - 100 mg/dL    Performed by: MalcolmMorganPCT    POCT Glucose    Collection Time: 01/03/23  4:18 PM   Result Value Ref Range    POC Glucose 320 (H) 65 - 100 mg/dL    Performed by: Mark      Lab Results   Component Value Date/Time    CHOL 77 03/05/2022 06:14 AM    HDL 45 03/05/2022 06:14 AM    VLDL 18 09/21/2021 02:54 PM         ASSESSMENT and PLAN      48-year-old gentleman with staph bacteremia. Agree with D[facility]K but will wait until just before discharge given his active influenza infection. Thank you for allowing me to participate in this patient's care. Please call or contact me if there are any questions or concerns regarding the above.       Gracia Frye MD  01/03/23  5:28 PM

## 2023-01-03 NOTE — PROGRESS NOTES
Hospitalist Progress Note   Admit Date:  2022 10:02 AM   Name:  Bradley Weaver   Age:  65 y.o. Sex:  male  :  1945   MRN:  [id number]   Room:  821/    Reason(s) for Admission: Hypoxia [R09.02]  COPD exacerbation ([facility] [address] [J44.1]  Congestive heart failure, unspecified HF chronicity, unspecified heart failure type [facility]) [I50.9]     Hospital Course & Interval History:   Mr. Radha Gillespie is a nice 64 y/o WM with a h/o ischemic CM, chronic sCHF, SHERI, PAD, COPD, HTN, DM2, HLD and atrial fib who was admitted to our service on  with acute hypoxemic respiratory failure likely due to acute influenza with suspected COPDe. He was started on supplemental O2, steroids, Tamiflu and nebulizers. Blood cultures grew alpha strep and CoNS. He is on ceftriaxone. Weaned off O2. ID consulted , vancomycin added, remains on CTX. Subjective/24hr Events (23): Feels well, afebrile, working with therapy. Good appetite. Wife at bedside. Patient denies chest pain, SOB. Remains on RA. Assessment & Plan:   # Bacteremia due to alpha streptococcus, CoNS              - Suspect coag neg staph is a contaminant since only 1 bottle. Strep is in both bottles of 1 set. Repeat blood cultures are negative to date.               - Rocephin started on . No consolidation on CXR. No recent dental work. L heel wound healing well. ID consulted  and vancomycin added. Recommending Cardiology eval for DIRK given his co-morbids. # Acute hypoxemic respiratory failure 2/2 influenza              - CXR w/o consolidation. On RA as of . Finished [place] . Changed to prednisone /2, will taper quickly over a few days. # HypoK              - Resolved. # CAD // chronic sCHF              - Euvolemic. Coreg resumed . Con't Plavix,      # Atrial fibrillation              - Con't Eliquis, amio. Coreg resumed . # DM2              - Hold orals. Con't SSI. Glucose trend has improved as of 1/3.  No changes today.    Discharge Planning: Home when able. Diet:  ADULT DIET; Regular; 4 carb choices (60 gm/meal)  DVT PPx: Eliquis  Code status: Full Code    Hospital Problems             Last Modified POA    * (Principal) COPD exacerbation ([facility] [address] 12/29/2022 Yes    ICD (implantable cardioverter-defibrillator) in place 12/29/2022 Yes    Influenza 12/29/2022 Yes    Atrial fibrillation ([facility] [address] 12/29/2022 Yes    COPD (chronic obstructive pulmonary disease) ([facility] [address] 12/29/2022 Yes    DM (diabetes mellitus) type II, controlled, with peripheral vascular disorder ([facility] [address] 12/29/2022 Yes       Objective:   Patient Vitals for the past 24 hrs:   Temp Pulse Resp BP SpO2   01/03/23 1129 98.2 °F (36.8 °C) 68 18 111/65 92 %   01/03/23 0904 -- 60 17 -- 94 %   01/03/23 0716 97.6 °F (36.4 °C) 76 18 (!) 155/89 94 %   01/03/23 0428 98.1 °F (36.7 °C) 58 19 (!) 147/82 95 %   01/02/23 2340 97.3 °F (36.3 °C) 60 19 (!) 144/85 95 %   01/02/23 2049 98.4 °F (36.9 °C) 60 19 (!) 142/80 95 %   01/02/23 1549 97.5 °F (36.4 °C) 64 17 133/64 91 %       Estimated body mass index is 31.09 kg/m² as calculated from the following:    Height as of this encounter: 6' 1\" (1.854 m). Weight as of this encounter: 235 lb 10.8 oz (106.9 kg). Intake/Output Summary (Last 24 hours) at 1/3/2023 1516  Last data filed at 1/3/2023 1116  Gross per 24 hour   Intake --   Output 400 ml   Net -400 ml         Physical Exam:   Blood pressure 111/65, pulse 68, temperature 98.2 °F (36.8 °C), temperature source Oral, resp. rate 18, height 6' 1\" (1.854 m), weight 235 lb 10.8 oz (106.9 kg), SpO2 92 %. General:    Well nourished. No overt distress. Head:  Normocephalic, atraumatic  Eyes:  Sclerae appear normal. Pupils equally round. ENT:  Nares appear normal, no drainage. Moist oral mucosa  Neck:  No restricted ROM. Trachea midline. CV:   RRR. No m/r/g. No jugular venous distension. Lungs:   CTAB. No wheezing, rhonchi, or rales. Respirations even, unlabored.  On room air and comfortable. Abdomen: Bowel sounds present. Soft, nontender, nondistended. Extremities: No cyanosis or clubbing. No edema. Wound to L heel, no surrounding erythema. Skin:     No rashes and normal coloration. Warm and dry. Neuro:  CN II-XII grossly intact. Sensation intact. A&Ox3  Psych:  Normal mood and affect.       I have reviewed ordered lab tests and independently visualized imaging below:    Recent Labs:  Recent Results (from the past 48 hour(s))   POCT Glucose    Collection Time: 01/01/23  4:08 PM   Result Value Ref Range    POC Glucose 342 (H) 65 - 100 mg/dL    Performed by: IraisCT    POCT Glucose    Collection Time: 01/01/23  8:40 PM   Result Value Ref Range    POC Glucose 280 (H) 65 - 100 mg/dL    Performed by: Pino    POCT Glucose    Collection Time: 01/02/23  8:14 AM   Result Value Ref Range    POC Glucose 320 (H) 65 - 100 mg/dL    Performed by: Shen    Culture, Blood 1    Collection Time: 01/02/23 11:23 AM    Specimen: Blood   Result Value Ref Range    Special Requests RIGHT  Antecubital        Culture NO GROWTH AFTER 20 HOURS     Culture, Blood 1    Collection Time: 01/02/23 11:29 AM    Specimen: Blood   Result Value Ref Range    Special Requests RIGHT  FOREARM        Culture NO GROWTH AFTER 20 HOURS     POCT Glucose    Collection Time: 01/02/23 12:28 PM   Result Value Ref Range    POC Glucose 327 (H) 65 - 100 mg/dL    Performed by: BriceEcoloCapviaPCT    POCT Glucose    Collection Time: 01/02/23  4:19 PM   Result Value Ref Range    POC Glucose 335 (H) 65 - 100 mg/dL    Performed by: BriceElixir Bio-TechaPCT    POCT Glucose    Collection Time: 01/02/23  9:02 PM   Result Value Ref Range    POC Glucose 314 (H) 65 - 100 mg/dL    Performed by: Mary Tierney    Basic Metabolic Panel w/ Reflex to MG    Collection Time: 01/03/23  4:12 AM   Result Value Ref Range    Sodium 138 133 - 143 mmol/L    Potassium 3.5 3.5 - 5.1 mmol/L    Chloride 102 101 - 110 mmol/L CO2 35 (H) 21 - 32 mmol/L    Anion Gap 1 (L) 2 - 11 mmol/L    Glucose 181 (H) 65 - 100 mg/dL    BUN 22 8 - 23 MG/DL    Creatinine 0.90 0.8 - 1.5 MG/DL    Est, Glom Filt Rate >60 >60 ml/min/1.73m2    Calcium 8.3 8.3 - 10.4 MG/DL   Magnesium    Collection Time: 01/03/23  4:12 AM   Result Value Ref Range    Magnesium 2.0 1.8 - 2.4 mg/dL   POCT Glucose    Collection Time: 01/03/23  7:17 AM   Result Value Ref Range    POC Glucose 151 (H) 65 - 100 mg/dL    Performed by: AnthonyT    POCT Glucose    Collection Time: 01/03/23 11:30 AM   Result Value Ref Range    POC Glucose 204 (H) 65 - 100 mg/dL    Performed by: Tracie          Other Studies:  No results found.     Current Meds:  Current Facility-Administered Medications   Medication Dose Route Frequency    predniSONE (DELTASONE) tablet 40 mg  40 mg Oral Daily    vancomycin (VANCOCIN) 1,000 mg in sodium chloride 0.9 % 250 mL IVPB ([id number])  1,000 mg IntraVENous Q12H    cefTRIAXone (ROCEPHIN) 2,000 mg in sodium chloride 0.9 % 50 mL IVPB mini-bag  2,000 mg IntraVENous Q24H    sodium chloride flush 0.9 % injection 5-40 mL  5-40 mL IntraVENous 2 times per day    sodium chloride flush 0.9 % injection 5-40 mL  5-40 mL IntraVENous PRN    0.9 % sodium chloride infusion   IntraVENous PRN    ondansetron (ZOFRAN-ODT) disintegrating tablet 4 mg  4 mg Oral Q8H PRN    Or    ondansetron (ZOFRAN) injection 4 mg  4 mg IntraVENous Q6H PRN    polyethylene glycol (GLYCOLAX) packet 17 g  17 g Oral Daily PRN    amiodarone (CORDARONE) tablet 200 mg  200 mg Oral Daily    apixaban (ELIQUIS) tablet 5 mg  5 mg Oral Q12H    carvedilol (COREG) tablet 25 mg  25 mg Oral BID WC    clopidogrel (PLAVIX) tablet 75 mg  75 mg Oral Daily    latanoprost (XALATAN) 0.005 % ophthalmic solution 1 drop  1 drop Both Eyes Nightly    magnesium oxide (MAG-OX) tablet 400 mg  400 mg Oral Daily    montelukast (SINGULAIR) tablet 10 mg  10 mg Oral Nightly    rosuvastatin (CRESTOR) tablet 10 mg  10 mg Oral Nightly    [Held by provider] valsartan (DIOVAN) tablet 80 mg  80 mg Oral Daily    glucose chewable tablet 16 g  4 tablet Oral PRN    dextrose bolus 10% 125 mL  125 mL IntraVENous PRN    Or    dextrose bolus 10% 250 mL  250 mL IntraVENous PRN    glucagon (rDNA) injection 1 mg  1 mg SubCUTAneous PRN    dextrose 10 % infusion   IntraVENous Continuous PRN    insulin lispro (HUMALOG) injection vial 0-8 Units  0-8 Units SubCUTAneous TID WC    insulin lispro (HUMALOG) injection vial 0-4 Units  0-4 Units SubCUTAneous Nightly    albuterol (PROVENTIL) nebulizer solution 2.5 mg  2.5 mg Nebulization Q6H PRN    tiotropium (SPIRIVA RESPIMAT) 2.5 MCG/ACT inhaler 2 puff  2 puff Inhalation Daily    potassium chloride (KLOR-CON M) extended release tablet 40 mEq  40 mEq Oral Once    furosemide (LASIX) tablet 40 mg  40 mg Oral Daily       Signed:  Marylin Carter MD    Part of this note may have been written by using a voice dictation software. The note has been proof read but may still contain some grammatical/other typographical errors.

## 2023-01-04 ENCOUNTER — APPOINTMENT (OUTPATIENT)
Dept: CARDIAC CATH/INVASIVE PROCEDURES | Age: 78
End: 2023-01-04
Payer: MEDICARE

## 2023-01-04 LAB
ANION GAP SERPL CALC-SCNC: 2 MMOL/L (ref 2–11)
BACTERIA SPEC CULT: NORMAL
BUN SERPL-MCNC: 20 MG/DL (ref 8–23)
CALCIUM SERPL-MCNC: 8.3 MG/DL (ref 8.3–10.4)
CHLORIDE SERPL-SCNC: 101 MMOL/L (ref 101–110)
CO2 SERPL-SCNC: 35 MMOL/L (ref 21–32)
CREAT SERPL-MCNC: 0.9 MG/DL (ref 0.8–1.5)
ERYTHROCYTE [DISTWIDTH] IN BLOOD BY AUTOMATED COUNT: 15.3 % (ref 11.9–14.6)
GLUCOSE BLD STRIP.AUTO-MCNC: 161 MG/DL (ref 65–100)
GLUCOSE BLD STRIP.AUTO-MCNC: 232 MG/DL (ref 65–100)
GLUCOSE BLD STRIP.AUTO-MCNC: 269 MG/DL (ref 65–100)
GLUCOSE BLD STRIP.AUTO-MCNC: 370 MG/DL (ref 65–100)
GLUCOSE SERPL-MCNC: 160 MG/DL (ref 65–100)
HCT VFR BLD AUTO: 29.7 % (ref 41.1–50.3)
HGB BLD-MCNC: 9.8 G/DL (ref 13.6–17.2)
MCH RBC QN AUTO: 30.1 PG (ref 26.1–32.9)
MCHC RBC AUTO-ENTMCNC: 33 G/DL (ref 31.4–35)
MCV RBC AUTO: 91.1 FL (ref 82–102)
MM INDURATION, POC: 0 MM (ref 0–5)
NRBC # BLD: 0 K/UL (ref 0–0.2)
PLATELET # BLD AUTO: 175 K/UL (ref 150–450)
PMV BLD AUTO: 11 FL (ref 9.4–12.3)
POTASSIUM SERPL-SCNC: 4.1 MMOL/L (ref 3.5–5.1)
PPD, POC: NEGATIVE
RBC # BLD AUTO: 3.26 M/UL (ref 4.23–5.6)
SERVICE CMNT-IMP: ABNORMAL
SERVICE CMNT-IMP: NORMAL
SODIUM SERPL-SCNC: 138 MMOL/L (ref 133–143)
VANCOMYCIN SERPL-MCNC: 13.4 UG/ML
WBC # BLD AUTO: 9.2 K/UL (ref 4.3–11.1)

## 2023-01-04 PROCEDURE — 94640 AIRWAY INHALATION TREATMENT: CPT

## 2023-01-04 PROCEDURE — 36415 COLL VENOUS BLD VENIPUNCTURE: CPT

## 2023-01-04 PROCEDURE — 97530 THERAPEUTIC ACTIVITIES: CPT

## 2023-01-04 PROCEDURE — 82962 GLUCOSE BLOOD TEST: CPT

## 2023-01-04 PROCEDURE — 2580000003 HC RX 258: Performed by: INTERNAL MEDICINE

## 2023-01-04 PROCEDURE — 99232 SBSQ HOSP IP/OBS MODERATE 35: CPT | Performed by: INTERNAL MEDICINE

## 2023-01-04 PROCEDURE — 6370000000 HC RX 637 (ALT 250 FOR IP): Performed by: INTERNAL MEDICINE

## 2023-01-04 PROCEDURE — 97110 THERAPEUTIC EXERCISES: CPT

## 2023-01-04 PROCEDURE — 1100000000 HC RM PRIVATE

## 2023-01-04 PROCEDURE — 80202 ASSAY OF VANCOMYCIN: CPT

## 2023-01-04 PROCEDURE — 6360000002 HC RX W HCPCS: Performed by: INTERNAL MEDICINE

## 2023-01-04 PROCEDURE — 80048 BASIC METABOLIC PNL TOTAL CA: CPT

## 2023-01-04 PROCEDURE — 85027 COMPLETE CBC AUTOMATED: CPT

## 2023-01-04 RX ORDER — INSULIN LISPRO 100 [IU]/ML
7 INJECTION, SOLUTION INTRAVENOUS; SUBCUTANEOUS ONCE
Status: COMPLETED | OUTPATIENT
Start: 2023-01-04 | End: 2023-01-04

## 2023-01-04 RX ORDER — PREDNISONE 20 MG/1
20 TABLET ORAL DAILY
Status: DISCONTINUED | OUTPATIENT
Start: 2023-01-05 | End: 2023-01-06 | Stop reason: HOSPADM

## 2023-01-04 RX ADMIN — APIXABAN 5 MG: 5 TABLET, FILM COATED ORAL at 22:00

## 2023-01-04 RX ADMIN — MONTELUKAST 10 MG: 10 TABLET, FILM COATED ORAL at 22:00

## 2023-01-04 RX ADMIN — INSULIN LISPRO 7 UNITS: 100 INJECTION, SOLUTION INTRAVENOUS; SUBCUTANEOUS at 13:38

## 2023-01-04 RX ADMIN — CEFTRIAXONE SODIUM 2000 MG: 2 INJECTION, POWDER, FOR SOLUTION INTRAMUSCULAR; INTRAVENOUS at 11:32

## 2023-01-04 RX ADMIN — FUROSEMIDE 40 MG: 40 TABLET ORAL at 08:03

## 2023-01-04 RX ADMIN — MAGNESIUM GLUCONATE 500 MG ORAL TABLET 400 MG: 500 TABLET ORAL at 08:03

## 2023-01-04 RX ADMIN — PREDNISONE 40 MG: 20 TABLET ORAL at 08:03

## 2023-01-04 RX ADMIN — CARVEDILOL 25 MG: 25 TABLET, FILM COATED ORAL at 16:40

## 2023-01-04 RX ADMIN — AMIODARONE HYDROCHLORIDE 200 MG: 200 TABLET ORAL at 08:02

## 2023-01-04 RX ADMIN — TIOTROPIUM BROMIDE INHALATION SPRAY 2 PUFF: 3.12 SPRAY, METERED RESPIRATORY (INHALATION) at 08:29

## 2023-01-04 RX ADMIN — VANCOMYCIN HYDROCHLORIDE 1000 MG: 1 INJECTION, POWDER, LYOPHILIZED, FOR SOLUTION INTRAVENOUS at 12:41

## 2023-01-04 RX ADMIN — INSULIN LISPRO 8 UNITS: 100 INJECTION, SOLUTION INTRAVENOUS; SUBCUTANEOUS at 13:37

## 2023-01-04 RX ADMIN — ROSUVASTATIN 10 MG: 10 TABLET, FILM COATED ORAL at 22:00

## 2023-01-04 RX ADMIN — INSULIN LISPRO 4 UNITS: 100 INJECTION, SOLUTION INTRAVENOUS; SUBCUTANEOUS at 16:40

## 2023-01-04 RX ADMIN — APIXABAN 5 MG: 5 TABLET, FILM COATED ORAL at 08:03

## 2023-01-04 RX ADMIN — SODIUM CHLORIDE, PRESERVATIVE FREE 10 ML: 5 INJECTION INTRAVENOUS at 22:01

## 2023-01-04 RX ADMIN — CARVEDILOL 25 MG: 25 TABLET, FILM COATED ORAL at 08:02

## 2023-01-04 RX ADMIN — LATANOPROST 1 DROP: 50 SOLUTION OPHTHALMIC at 22:01

## 2023-01-04 RX ADMIN — CLOPIDOGREL BISULFATE 75 MG: 75 TABLET ORAL at 08:02

## 2023-01-04 ASSESSMENT — PAIN SCALES - GENERAL: PAINLEVEL_OUTOF10: 0

## 2023-01-04 NOTE — PROGRESS NOTES
1/4/2023 3:51 PM    Admit Date: 12/29/2022    Admit Diagnosis: Hypoxia [R09.02]  COPD exacerbation (HCC) [J44.1]  Congestive heart failure, unspecified HF chronicity, unspecified heart failure type ([facility] [I50.9]      Subjective:   No cp or sob      Objective:    /63   Pulse 62   Temp 98.1 °F (36.7 °C) (Oral)   Resp 22   Ht 6' 1\" (1.854 m)   Wt 234 lb 9.1 oz (106.4 kg)   SpO2 90%   BMI 30.95 kg/m²     Physical Exam:  Page Lombard, Well Nourished, No Acute Distress, Alert & Oriented x 3, appropriate mood. Neck- supple, no JVD  CV- regular rate and rhythm no MRG  Lung- clear bilaterally  Abd- soft, nontender, nondistended  Ext- no edema bilaterally.   Skin- warm and dry        Data Review:   Recent Labs     01/04/23  0600      K 4.1   BUN 20   WBC 9.2   HGB 9.8*   HCT 29.7*          Assessment/Plan:     Active Hospital Problems    COPD exacerbation ([facility]      Influenza      ICD (implantable cardioverter-defibrillator) in place      Atrial fibrillation (HCC)      COPD (chronic obstructive pulmonary disease) ([facility]      DM (diabetes mellitus) type II, controlled, with peripheral vascular disorder ([facility]    Bacteremia- off flu precautions- tss in am- npo after mn

## 2023-01-04 NOTE — PROGRESS NOTES
Patient discussed with resident including soap and agree with the resident's finding and plan.  Diag -  Contraception on ortho novum  , h/o Irreg periods    Plan -  Hb normal , contd same ocp , will f/u in 2 mths    VANCO DAILY FOLLOW UP NOTE  [facility] Pharmacokinetic Monitoring Service - Vancomycin    Consulting Provider: Dr. Gus Marcelino   Indication: ICD infection  Target Concentration: Goal AUC/GIOVANNY 400-600 mg*hr/L  Day of Therapy: 3  Additional Antimicrobials: ceftriaxone     Patient eligible for piperacillin-tazobactam to cefepime auto-substitution per P&T approved protocol? NO    Pertinent Laboratory Values: Wt Readings from Last 1 Encounters:   01/04/23 234 lb 9.1 oz (106.4 kg)     Temp Readings from Last 1 Encounters:   01/04/23 97.3 °F (36.3 °C)     Recent Labs     01/03/23  0412 01/04/23  0600   BUN 22 20   CREATININE 0.90 0.90   WBC  --  9.2     Estimated Creatinine Clearance: 87 mL/min (based on SCr of 0.9 mg/dL). Lab Results   Component Value Date/Time    VANCOTROUGH 12.2 09/07/2022 09:15 AM    VANCORANDOM 13.4 01/04/2023 06:00 AM     MRSA Nasal Swab: N/A.  Non-respiratory infection    Assessment:  Date/Time Dose Concentration AUC   1/4 0600 1000 mg q12h 13.4 439   Note: Serum concentrations collected for AUC dosing may appear elevated if collected in close proximity to the dose administered, this is not necessarily an indication of toxicity    Plan:  Current dosing regimen is therapeutic  Continue current dose  Repeat vancomycin concentrations will be ordered as clinically appropriate   Pharmacy will continue to monitor patient and adjust therapy as indicated    Thank you for the consult,  Collin Mendoza [facility]

## 2023-01-04 NOTE — PROGRESS NOTES
Infectious Disease Progress Note    Today's Date: 2023   Admit Date: 2022    Impression:   Strep gallolyticus/Coag-negative Staph bacteremia (22) -  and  BCX pending  Left heel wound: wound care following  Ischemic cardiomyopathy with ICD in place  Culture-negative Lumbar discitis - treated from  -  with vanc/ceftriaxone  Lumbar compression fractures (L2/L4) s/p kyphoplasty, 22  Influenza A, s/p treatment/asymptomatic  Atrial fibrillation on Eliquis  Peripheral vascular disease  COPD  Type 2 DM    Plan:   Continue IV Vancomycin and Ceftriaxone--duration TBD, pending DIRK  Overall suspicion is low for endocarditis but he is high risk with all of his co-morbidities: discussed with primary, appears cardiology awaiting flu clearance? Dispo: unclear at this time, prior IV antbx were completed at [facility]    Anti-infectives:   IV vanc  IV ceftriaxone    Subjective: Interval history: afebrile; no SOB/cough, no fever/chills. Discussed with Dr Lexi Duffy    Allergies   Allergen Reactions    Acetaminophen Hives     Per spouse has small amount of hives, takes 1/2 and tolerates     Azithromycin Hives    Morphine Hallucinations     Muscle twitching. jerking    Oxaprozin Other (See Comments)     ELEVATED BLOOD PRESSURE    Oxycodone Anxiety        Review of Systems:  A comprehensive review of systems is negative except as stated above.       Objective:     Visit Vitals  BP (!) 182/72   Pulse 51   Temp 97.3 °F (36.3 °C) (Oral)   Resp 20   Ht 6' 1\" (1.854 m)   Wt 234 lb 9.1 oz (106.4 kg)   SpO2 94%   BMI 30.95 kg/m²     Temp (24hrs), Av.1 °F (36.7 °C), Min:97.3 °F (36.3 °C), Max:98.6 °F (37 °C)       Patient examined 2023, exam remains unchanged except as noted below:    General:  Alert, cooperative, no acute distress   Head:  Normocephalic, atraumatic    Eyes:  Anicteric, no drainage/not injected   Throat: Mucus membranes moist OP clear   Lungs:   Clear throughout lung fields, no increased work of breathing, room air   Heart:  Regular rate and rhythm, without murmur, left chest ICD   Abdomen:   Soft, non-tender, no guarding, no distention, bowel sounds active   Extremities: Extremities without edema, pulses palpable   Skin: Skin without rash     Lines/Devices: PIV            Data Review:     CBC:  Recent Labs     01/04/23  0600   WBC 9.2   HGB 9.8*   HCT 29.7*            BMP:  Recent Labs     01/03/23  0412 01/04/23  0600   BUN 22 20    138   K 3.5 4.1    101   CO2 35* 35*         LFTS:  No results for input(s): ALT, TP, ALB in the last 72 hours.     Invalid input(s): TBILI, SGOT, AP    Microbiology:   Reviewed    Imaging:   Reviewed     Signed By: Thelma Corral NP, APRN - CNP     January 4, 2023

## 2023-01-04 NOTE — PROGRESS NOTES
ACUTE PHYSICAL THERAPY GOALS:   (Developed with and agreed upon by patient and/or caregiver. )  LTG:  (1.)Mr. Vicente Calabrese will move from supine to sit and sit to supine , scoot up and down, and roll side to side in bed with STAND BY ASSIST within 7 treatment day(s). GOAL MET 1/4/2023    (2.)Mr. Vicente Calabrese will transfer from bed to chair and chair to bed with STAND BY ASSIST using the least restrictive device within 7 treatment day(s). (3.)Mr. Vicente Calabrese will ambulate with STAND BY ASSIST for 50+ feet with the least restrictive device within 7 treatment day(s) while maintaining normal vital signs, limited WB L heel. PHYSICAL THERAPY: Daily Note AM   (Link to Caseload Tracking: PT Visit Days : 2  Time In/Out PT Charge Capture  Rehab Caseload Tracker  Orders    Angela Mendosa is a 65 y.o. male   PRIMARY DIAGNOSIS: COPD exacerbation ([facility]  Hypoxia [R09.02]  COPD exacerbation (HCC) [J44.1]  Congestive heart failure, unspecified HF chronicity, unspecified heart failure type ([facility] [I50.9]       Inpatient: Payor: MEDICARE / Plan: MEDICARE PART A AND B / Product Type: *No Product type* /     ASSESSMENT:     REHAB RECOMMENDATIONS:   Recommendation to date pending progress:  Setting:  Home Health Therapy    Equipment:    None     ASSESSMENT:  Mr. Vicente Calabrese is supine in bed and willing to participate. He performed supine exercises in the bed then sat up without help. He stood into the walker with CGA and was able to walk into the bathroom and clean himself once done. He stood from the low toilet very well and walked back to the chair all with CGA. Moving better daily.      SUBJECTIVE:   Mr. Vicente Calabrese states, \"I did better than I thought I would\"     Social/Functional Lives With: Spouse  Type of Home: House  Home Access: Stairs to enter with rails  Entrance Stairs - Number of Steps: 2  Bathroom Shower/Tub: Walk-in shower  Bathroom Toilet: Handicap height  Bathroom Equipment: Shower chair  Receives Help From: Family  ADL Assistance: Needs assistance  Ambulation Assistance:  (RW)  Transfer Assistance: Independent  Active : Yes  Additional Comments: Wife assists w/ bathing, LB dressing, and occasionally for thorougness w/ toilet hygiene.   OBJECTIVE:     PAIN: Mitchell Cousin / O2: PRECAUTION / Jackqueline Mitten / DRAINS:   Pre Treatment:          Post Treatment: 0 Vitals        Oxygen    None    RESTRICTIONS/PRECAUTIONS:        MOBILITY: I Mod I S SBA CGA Min Mod Max Total  NT x2 Comments:   Bed Mobility    Rolling [] [] [] [] [] [] [] [] [] [] []    Supine to Sit [] [x] [] [] [] [] [] [] [] [] []    Scooting [] [x] [] [] [] [] [] [] [] [] []    Sit to Supine [] [] [] [] [] [] [] [] [] [] []    Transfers    Sit to Stand [] [] [] [] [x] [] [] [] [] [] []    Bed to Chair [] [] [] [] [x] [] [] [] [] [] []    Stand to Sit [] [] [] [] [x] [] [] [] [] [] []     [] [] [] [] [] [] [] [] [] [] []    I=Independent, Mod I=Modified Independent, S=Supervision, SBA=Standby Assistance, CGA=Contact Guard Assistance,   Min=Minimal Assistance, Mod=Moderate Assistance, Max=Maximal Assistance, Total=Total Assistance, NT=Not Tested    BALANCE: Good Fair+ Fair Fair- Poor NT Comments   Sitting Static [x] [] [] [] [] []    Sitting Dynamic [x] [] [] [] [] []              Standing Static [] [x] [x] [] [] []    Standing Dynamic [] [] [x] [] [] []      GAIT: I Mod I S SBA CGA Min Mod Max Total  NT x2 Comments:   Level of Assistance [] [] [] [] [x] [] [] [] [] [] []    Distance 10 (x2) feet    DME Rolling Walker    Gait Quality Decreased step clearance, Decreased step length, and Shuffling     Weightbearing Status      Stairs      I=Independent, Mod I=Modified Independent, S=Supervision, SBA=Standby Assistance, CGA=Contact Guard Assistance,   Min=Minimal Assistance, Mod=Moderate Assistance, Max=Maximal Assistance, Total=Total Assistance, NT=Not Tested    PLAN:   FREQUENCY AND DURATION: 3 times/week for duration of hospital stay or until stated goals are met, whichever comes first.    TREATMENT:   TREATMENT:   Therapeutic Activity (15 Minutes): Therapeutic activity included Supine to Sit, Scooting, Transfer Training, Ambulation on level ground, Sitting balance , and Standing balance to improve functional Activity tolerance, Balance, Mobility, and Strength. Therapeutic Exercise (15 Minutes): Therapeutic exercises noted below to improve functional activity tolerance, AROM, strength, and mobility.      TREATMENT GRID:   Date:  1/4 Date:   Date:     Activity/Exercise Parameters Parameters Parameters   AP 10xB     Heel slides 10xB     Hip abd 10xB                                 AFTER TREATMENT PRECAUTIONS: Bed/Chair Locked, Call light within reach, Chair, Needs within reach, and RN notified    INTERDISCIPLINARY COLLABORATION:  RN/ PCT and PT/ PTA    EDUCATION:      TIME IN/OUT:  Time In: 1005  Time Out: [address]  Minutes: 30    Olivia Williamson PTA

## 2023-01-04 NOTE — CARE COORDINATION
CM referred patient to [facility] for cost of abx at discharge. Insurance is requesting ID contact Medicare regarding vancomycin. JEANETTE Kelley Jeremiah with the phone and ID #s. Supplies cost for both meds is $25 per day and the ceftriaxone is $15.62 per week. At this time the total out of pocket cost is $109.62 plus what the vanc prices out at after ID calls insurance.

## 2023-01-04 NOTE — PROGRESS NOTES
Hospitalist Progress Note   Admit Date:  2022 10:02 AM   Name:  Nicole Rock   Age:  65 y.o. Sex:  male  :  1945   MRN:  [id number]   Room:  1/    Reason(s) for Admission: Hypoxia [R09.02]  COPD exacerbation ([facility] [J44.1]  Congestive heart failure, unspecified HF chronicity, unspecified heart failure type [facility]) [I50.9]     Hospital Course & Interval History:   Mr. Iliana Perez is a nice 64 y/o WM with a h/o ischemic CM, chronic sCHF, SHERI, PAD, COPD, HTN, DM2, HLD and atrial fib who was admitted to our service on  with acute hypoxemic respiratory failure likely due to acute influenza with suspected COPDe. He was started on supplemental O2, steroids, Tamiflu and nebulizers. Blood cultures grew alpha strep and CoNS. He is on ceftriaxone. Weaned off O2. ID consulted , vancomycin added, remains on CTX. Repeat cxs neg. Cardiology consulted 1/3 for DIRK. Subjective/24hr Events (23):  Up to chair, afebrile, on RA, feeling well. No chest pain or SOB. Assessment & Plan:   # Bacteremia due to alpha streptococcus, CoNS              - Suspect coag neg staph is a contaminant since only 1 bottle. Strep is in both bottles of 1 set. Repeat blood cultures are negative to date.               - Rocephin started on . No consolidation on CXR. No recent dental work. L heel wound healing well. ID consulted  and vancomycin added. Cardiology consulted for DIRK. He can come off flu precautions today, . # Acute hypoxemic respiratory failure / influenza              - CXR w/o consolidation. On RA for 3 days, afebrile, completed Tamiflu, weaning prednisone. Discontinue flu precautions on . # HypoK              - Resolved. # CAD // chronic sCHF              - Euvolemic. Coreg resumed . Con't Plavix,      # Atrial fibrillation              - Con't Eliquis, amio. Coreg resumed . # DM2              - Hold orals. Con't SSI. No changes .     Discharge Planning: Pending. Diet:  ADULT DIET; Regular; 4 carb choices (60 gm/meal)  DVT PPx: Eliquis  Code status: Full Code    Hospital Problems             Last Modified POA    * (Principal) COPD exacerbation ([facility] 12/29/2022 Yes    ICD (implantable cardioverter-defibrillator) in place 12/29/2022 Yes    Influenza 12/29/2022 Yes    Atrial fibrillation ([facility] 12/29/2022 Yes    COPD (chronic obstructive pulmonary disease) ([facility] 12/29/2022 Yes    DM (diabetes mellitus) type II, controlled, with peripheral vascular disorder ([facility] 12/29/2022 Yes       Objective:   Patient Vitals for the past 24 hrs:   Temp Pulse Resp BP SpO2   01/04/23 0734 97.3 °F (36.3 °C) 51 20 (!) 182/72 94 %   01/04/23 0244 97.5 °F (36.4 °C) 65 19 (!) 152/80 93 %   01/03/23 2255 98.6 °F (37 °C) 60 18 (!) 161/87 95 %   01/03/23 1921 98.6 °F (37 °C) 60 18 125/79 92 %   01/03/23 1522 98.6 °F (37 °C) 64 18 131/66 93 %       Estimated body mass index is 30.95 kg/m² as calculated from the following:    Height as of this encounter: 6' 1\" (1.854 m). Weight as of this encounter: 234 lb 9.1 oz (106.4 kg). Intake/Output Summary (Last 24 hours) at 1/4/2023 1136  Last data filed at 1/4/2023 0934  Gross per 24 hour   Intake 120 ml   Output 1400 ml   Net -1280 ml         Physical Exam:   Blood pressure (!) 182/72, pulse 51, temperature 97.3 °F (36.3 °C), temperature source Oral, resp. rate 20, height 6' 1\" (1.854 m), weight 234 lb 9.1 oz (106.4 kg), SpO2 94 %. General:    Well nourished. No overt distress. Up to chair, pleasant. Head:  Normocephalic, atraumatic  Eyes:  Sclerae appear normal. Pupils equally round. ENT:  Nares appear normal, no drainage. Moist oral mucosa  Neck:  No restricted ROM. Trachea midline. CV:   RRR. No m/r/g. Lungs:   No rhonchi, or rales. Mild wheezes b/l. Respirations even, unlabored. RA O2. Abdomen: Bowel sounds present. Soft, nontender, nondistended. Extremities: No cyanosis or clubbing. No edema. L heel wound bandaged.   Skin:     No rashes and normal coloration. Warm and dry. Neuro:  CN II-XII grossly intact. Sensation intact. A&Ox3  Psych:  Normal mood and affect. I have reviewed ordered lab tests and independently visualized imaging below:    Recent Labs:  Recent Results (from the past 48 hour(s))   POCT Glucose    Collection Time: 01/02/23 12:28 PM   Result Value Ref Range    POC Glucose 327 (H) 65 - 100 mg/dL    Performed by: [employer]    POCT Glucose    Collection Time: 01/02/23  4:19 PM   Result Value Ref Range    POC Glucose 335 (H) 65 - 100 mg/dL    Performed by: [employer]    POCT Glucose    Collection Time: 01/02/23  9:02 PM   Result Value Ref Range    POC Glucose 314 (H) 65 - 100 mg/dL    Performed by: Edla Barahona    Basic Metabolic Panel w/ Reflex to MG    Collection Time: 01/03/23  4:12 AM   Result Value Ref Range    Sodium 138 133 - 143 mmol/L    Potassium 3.5 3.5 - 5.1 mmol/L    Chloride 102 101 - 110 mmol/L    CO2 35 (H) 21 - 32 mmol/L    Anion Gap 1 (L) 2 - 11 mmol/L    Glucose 181 (H) 65 - 100 mg/dL    BUN 22 8 - 23 MG/DL    Creatinine 0.90 0.8 - 1.5 MG/DL    Est, Glom Filt Rate >60 >60 ml/min/1.73m2    Calcium 8.3 8.3 - 10.4 MG/DL   Magnesium    Collection Time: 01/03/23  4:12 AM   Result Value Ref Range    Magnesium 2.0 1.8 - 2.4 mg/dL   POCT Glucose    Collection Time: 01/03/23  7:17 AM   Result Value Ref Range    POC Glucose 151 (H) 65 - 100 mg/dL    Performed by: [employer]    POCT Glucose    Collection Time: 01/03/23 11:30 AM   Result Value Ref Range    POC Glucose 204 (H) 65 - 100 mg/dL    Performed by:  [employer]col[employer]organPCT    POCT Glucose    Collection Time: 01/03/23  4:18 PM   Result Value Ref Range    POC Glucose 320 (H) 65 - 100 mg/dL    Performed by: Mark    POCT Glucose    Collection Time: 01/03/23  8:31 PM   Result Value Ref Range    POC Glucose 292 (H) 65 - 100 mg/dL    Performed by: Saul    Vancomycin Level, Random    Collection Time: 01/04/23  6:00 AM Result Value Ref Range    Vancomycin Rm 13.4 UG/ML   CBC    Collection Time: 01/04/23  6:00 AM   Result Value Ref Range    WBC 9.2 4.3 - 11.1 K/uL    RBC 3.26 (L) 4.23 - 5.6 M/uL    Hemoglobin 9.8 (L) 13.6 - 17.2 g/dL    Hematocrit 29.7 (L) 41.1 - 50.3 %    MCV 91.1 82 - 102 FL    MCH 30.1 26.1 - 32.9 PG    MCHC 33.0 31.4 - 35.0 g/dL    RDW 15.3 (H) 11.9 - 14.6 %    Platelets 263 139 - 366 K/uL    MPV 11.0 9.4 - 12.3 FL    nRBC 0.00 0.0 - 0.2 K/uL   Basic Metabolic Panel w/ Reflex to MG    Collection Time: 01/04/23  6:00 AM   Result Value Ref Range    Sodium 138 133 - 143 mmol/L    Potassium 4.1 3.5 - 5.1 mmol/L    Chloride 101 101 - 110 mmol/L    CO2 35 (H) 21 - 32 mmol/L    Anion Gap 2 2 - 11 mmol/L    Glucose 160 (H) 65 - 100 mg/dL    BUN 20 8 - 23 MG/DL    Creatinine 0.90 0.8 - 1.5 MG/DL    Est, Glom Filt Rate >60 >60 ml/min/1.73m2    Calcium 8.3 8.3 - 10.4 MG/DL   POCT Glucose    Collection Time: 01/04/23  7:36 AM   Result Value Ref Range    POC Glucose 161 (H) 65 - 100 mg/dL    Performed by: Tracie          Other Studies:  No results found.     Current Meds:  Current Facility-Administered Medications   Medication Dose Route Frequency    predniSONE (DELTASONE) tablet 40 mg  40 mg Oral Daily    vancomycin (VANCOCIN) 1,000 mg in sodium chloride 0.9 % 250 mL IVPB ([id number])  1,000 mg IntraVENous Q12H    cefTRIAXone (ROCEPHIN) 2,000 mg in sodium chloride 0.9 % 50 mL IVPB mini-bag  2,000 mg IntraVENous Q24H    sodium chloride flush 0.9 % injection 5-40 mL  5-40 mL IntraVENous 2 times per day    sodium chloride flush 0.9 % injection 5-40 mL  5-40 mL IntraVENous PRN    0.9 % sodium chloride infusion   IntraVENous PRN    ondansetron (ZOFRAN-ODT) disintegrating tablet 4 mg  4 mg Oral Q8H PRN    Or    ondansetron (ZOFRAN) injection 4 mg  4 mg IntraVENous Q6H PRN    polyethylene glycol (GLYCOLAX) packet 17 g  17 g Oral Daily PRN    amiodarone (CORDARONE) tablet 200 mg  200 mg Oral Daily    apixaban (ELIQUIS) tablet 5 mg  5 mg Oral Q12H    carvedilol (COREG) tablet 25 mg  25 mg Oral BID WC    clopidogrel (PLAVIX) tablet 75 mg  75 mg Oral Daily    latanoprost (XALATAN) 0.005 % ophthalmic solution 1 drop  1 drop Both Eyes Nightly    magnesium oxide (MAG-OX) tablet 400 mg  400 mg Oral Daily    montelukast (SINGULAIR) tablet 10 mg  10 mg Oral Nightly    rosuvastatin (CRESTOR) tablet 10 mg  10 mg Oral Nightly    [Held by provider] valsartan (DIOVAN) tablet 80 mg  80 mg Oral Daily    glucose chewable tablet 16 g  4 tablet Oral PRN    dextrose bolus 10% 125 mL  125 mL IntraVENous PRN    Or    dextrose bolus 10% 250 mL  250 mL IntraVENous PRN    glucagon (rDNA) injection 1 mg  1 mg SubCUTAneous PRN    dextrose 10 % infusion   IntraVENous Continuous PRN    insulin lispro (HUMALOG) injection vial 0-8 Units  0-8 Units SubCUTAneous TID WC    insulin lispro (HUMALOG) injection vial 0-4 Units  0-4 Units SubCUTAneous Nightly    albuterol (PROVENTIL) nebulizer solution 2.5 mg  2.5 mg Nebulization Q6H PRN    tiotropium (SPIRIVA RESPIMAT) 2.5 MCG/ACT inhaler 2 puff  2 puff Inhalation Daily    potassium chloride (KLOR-CON M) extended release tablet 40 mEq  40 mEq Oral Once    furosemide (LASIX) tablet 40 mg  40 mg Oral Daily       Signed:  Remy Virgen MD    Part of this note may have been written by using a voice dictation software. The note has been proof read but may still contain some grammatical/other typographical errors.

## 2023-01-05 ENCOUNTER — HOSPITAL ENCOUNTER (INPATIENT)
Dept: CARDIAC CATH/INVASIVE PROCEDURES | Age: 78
Discharge: HOME | End: 2023-01-07
Payer: MEDICARE

## 2023-01-05 VITALS — HEART RATE: 64 BPM | DIASTOLIC BLOOD PRESSURE: 65 MMHG | OXYGEN SATURATION: 94 % | SYSTOLIC BLOOD PRESSURE: 121 MMHG

## 2023-01-05 PROBLEM — B95.5 BACTEREMIA DUE TO STREPTOCOCCUS: Status: ACTIVE | Noted: 2023-01-05

## 2023-01-05 PROBLEM — R78.81 BACTEREMIA DUE TO STREPTOCOCCUS: Status: ACTIVE | Noted: 2023-01-05

## 2023-01-05 LAB
ANION GAP SERPL CALC-SCNC: 2 MMOL/L (ref 2–11)
BACTERIA SPEC CULT: ABNORMAL
BUN SERPL-MCNC: 18 MG/DL (ref 8–23)
CALCIUM SERPL-MCNC: 8.2 MG/DL (ref 8.3–10.4)
CHLORIDE SERPL-SCNC: 102 MMOL/L (ref 101–110)
CO2 SERPL-SCNC: 34 MMOL/L (ref 21–32)
CREAT SERPL-MCNC: 0.8 MG/DL (ref 0.8–1.5)
ECHO BSA: 2.29 M2
GLUCOSE BLD STRIP.AUTO-MCNC: 136 MG/DL (ref 65–100)
GLUCOSE BLD STRIP.AUTO-MCNC: 230 MG/DL (ref 65–100)
GLUCOSE BLD STRIP.AUTO-MCNC: 358 MG/DL (ref 65–100)
GLUCOSE SERPL-MCNC: 143 MG/DL (ref 65–100)
GRAM STN SPEC: ABNORMAL
LV EF: 58 %
LVEF MODALITY: NORMAL
POTASSIUM SERPL-SCNC: 3.8 MMOL/L (ref 3.5–5.1)
SERVICE CMNT-IMP: ABNORMAL
SODIUM SERPL-SCNC: 138 MMOL/L (ref 133–143)

## 2023-01-05 PROCEDURE — 6360000002 HC RX W HCPCS: Performed by: INTERNAL MEDICINE

## 2023-01-05 PROCEDURE — 80048 BASIC METABOLIC PNL TOTAL CA: CPT

## 2023-01-05 PROCEDURE — 93325 DOPPLER ECHO COLOR FLOW MAPG: CPT | Performed by: INTERNAL MEDICINE

## 2023-01-05 PROCEDURE — 2580000003 HC RX 258: Performed by: INTERNAL MEDICINE

## 2023-01-05 PROCEDURE — 99152 MOD SED SAME PHYS/QHP 5/>YRS: CPT | Performed by: INTERNAL MEDICINE

## 2023-01-05 PROCEDURE — 93320 DOPPLER ECHO COMPLETE: CPT | Performed by: INTERNAL MEDICINE

## 2023-01-05 PROCEDURE — 36415 COLL VENOUS BLD VENIPUNCTURE: CPT

## 2023-01-05 PROCEDURE — 99232 SBSQ HOSP IP/OBS MODERATE 35: CPT | Performed by: INTERNAL MEDICINE

## 2023-01-05 PROCEDURE — 6370000000 HC RX 637 (ALT 250 FOR IP): Performed by: INTERNAL MEDICINE

## 2023-01-05 PROCEDURE — 94760 N-INVAS EAR/PLS OXIMETRY 1: CPT

## 2023-01-05 PROCEDURE — 2700000000 HC OXYGEN THERAPY PER DAY

## 2023-01-05 PROCEDURE — 93312 ECHO TRANSESOPHAGEAL: CPT

## 2023-01-05 PROCEDURE — 93312 ECHO TRANSESOPHAGEAL: CPT | Performed by: INTERNAL MEDICINE

## 2023-01-05 PROCEDURE — 99152 MOD SED SAME PHYS/QHP 5/>YRS: CPT

## 2023-01-05 PROCEDURE — 94640 AIRWAY INHALATION TREATMENT: CPT

## 2023-01-05 PROCEDURE — 82962 GLUCOSE BLOOD TEST: CPT

## 2023-01-05 PROCEDURE — 1100000000 HC RM PRIVATE

## 2023-01-05 RX ORDER — MIDAZOLAM HYDROCHLORIDE 2 MG/2ML
INJECTION, SOLUTION INTRAMUSCULAR; INTRAVENOUS
Status: COMPLETED | OUTPATIENT
Start: 2023-01-05 | End: 2023-01-05

## 2023-01-05 RX ORDER — FENTANYL CITRATE 50 UG/ML
INJECTION, SOLUTION INTRAMUSCULAR; INTRAVENOUS
Status: COMPLETED | OUTPATIENT
Start: 2023-01-05 | End: 2023-01-05

## 2023-01-05 RX ADMIN — MIDAZOLAM HYDROCHLORIDE 1 MG: 1 INJECTION, SOLUTION INTRAMUSCULAR; INTRAVENOUS at 11:50

## 2023-01-05 RX ADMIN — MIDAZOLAM HYDROCHLORIDE 2 MG: 1 INJECTION, SOLUTION INTRAMUSCULAR; INTRAVENOUS at 11:47

## 2023-01-05 RX ADMIN — VANCOMYCIN HYDROCHLORIDE 1000 MG: 1 INJECTION, POWDER, LYOPHILIZED, FOR SOLUTION INTRAVENOUS at 23:58

## 2023-01-05 RX ADMIN — SODIUM CHLORIDE, PRESERVATIVE FREE 10 ML: 5 INJECTION INTRAVENOUS at 08:48

## 2023-01-05 RX ADMIN — CLOPIDOGREL BISULFATE 75 MG: 75 TABLET ORAL at 08:47

## 2023-01-05 RX ADMIN — MAGNESIUM GLUCONATE 500 MG ORAL TABLET 400 MG: 500 TABLET ORAL at 08:48

## 2023-01-05 RX ADMIN — AMIODARONE HYDROCHLORIDE 200 MG: 200 TABLET ORAL at 08:46

## 2023-01-05 RX ADMIN — SODIUM CHLORIDE, PRESERVATIVE FREE 10 ML: 5 INJECTION INTRAVENOUS at 20:35

## 2023-01-05 RX ADMIN — APIXABAN 5 MG: 5 TABLET, FILM COATED ORAL at 08:46

## 2023-01-05 RX ADMIN — FENTANYL CITRATE 25 MCG: 50 INJECTION, SOLUTION INTRAMUSCULAR; INTRAVENOUS at 11:47

## 2023-01-05 RX ADMIN — INSULIN LISPRO 8 UNITS: 100 INJECTION, SOLUTION INTRAVENOUS; SUBCUTANEOUS at 18:22

## 2023-01-05 RX ADMIN — CARVEDILOL 25 MG: 25 TABLET, FILM COATED ORAL at 18:30

## 2023-01-05 RX ADMIN — LATANOPROST 1 DROP: 50 SOLUTION OPHTHALMIC at 20:37

## 2023-01-05 RX ADMIN — PREDNISONE 20 MG: 20 TABLET ORAL at 08:48

## 2023-01-05 RX ADMIN — MONTELUKAST 10 MG: 10 TABLET, FILM COATED ORAL at 20:35

## 2023-01-05 RX ADMIN — TIOTROPIUM BROMIDE INHALATION SPRAY 2 PUFF: 3.12 SPRAY, METERED RESPIRATORY (INHALATION) at 09:11

## 2023-01-05 RX ADMIN — ROSUVASTATIN 10 MG: 10 TABLET, FILM COATED ORAL at 20:35

## 2023-01-05 RX ADMIN — FUROSEMIDE 40 MG: 40 TABLET ORAL at 08:47

## 2023-01-05 RX ADMIN — APIXABAN 5 MG: 5 TABLET, FILM COATED ORAL at 20:35

## 2023-01-05 RX ADMIN — CARVEDILOL 25 MG: 25 TABLET, FILM COATED ORAL at 08:47

## 2023-01-05 RX ADMIN — VANCOMYCIN HYDROCHLORIDE 1000 MG: 1 INJECTION, POWDER, LYOPHILIZED, FOR SOLUTION INTRAVENOUS at 00:35

## 2023-01-05 ASSESSMENT — PAIN SCALES - GENERAL
PAINLEVEL_OUTOF10: 0
PAINLEVEL_OUTOF10: 0

## 2023-01-05 NOTE — PROGRESS NOTES
Patient is asleep in the bed resting comfortably. Patient is on CPAP with 2L bleed in. No s/s of distress. Patient has been NPO since midnight. No events overnight. Call bell is within reach . Preparing to give bedside report.

## 2023-01-05 NOTE — Clinical Note
Physician has arrived. Dr Jeana Liu completed an assessment prior to procedure start. Continue POC.   ASA II Mallampati II

## 2023-01-05 NOTE — PROGRESS NOTES
Received report from [employer]. Patient is resting comfortably in the bed with CPAP at 2L . No s/s of distress. Patient is alert and oriented times 4. Call bell is within reach and patient is instructed to call for assistance. Patient is aware of being NPO beginning at midnight. Will continue to monitor.

## 2023-01-05 NOTE — PROGRESS NOTES
OT treatment attempted; MD in with pt. Will f/u as schedule and pt's status allows.     Leon Green, OT

## 2023-01-05 NOTE — PROGRESS NOTES
.. TRANSFER - IN REPORT:    Verbal report received from Cath lab  on Deeno Orquidea  being received from [facility] for change in patient condition ( )      Report consisted of patient's Situation, Background, Assessment and   Recommendations(SBAR). Information from the following report(s) Nurse Handoff Report was reviewed with the receiving nurse. Opportunity for questions and clarification was provided. Assessment completed upon patient's arrival to unit and care assumed.

## 2023-01-05 NOTE — PROGRESS NOTES
TRANSFER - OUT REPORT:    Verbal report given to Grant Polanco on Eugena Range  being transferred to  for routine post-op       Report consisted of patients Situation, Background, Assessment and Recommendations(SBAR). Information from the following report(s) SBAR, Procedure Summary, and Cardiac Rhythm PACED  was reviewed with the receiving nurse. Opportunity for questions and clarification was provided.

## 2023-01-05 NOTE — PROGRESS NOTES
[facility] CARDIOLOGY PROGRESS NOTE           1/5/2023 11:41 AM    Admit Date: 12/29/2022      Subjective:   No complaints of chest pain or dyspnea overnight. Borderline elevated blood pressures. Awaiting DIRK. He is no significant fevers overnight. ROS:  Cardiovascular:  As noted above    Objective:      Vitals:    01/05/23 0258 01/05/23 0600 01/05/23 0728 01/05/23 0914   BP: (!) 151/76  138/71    Pulse: 61  57 81   Resp: 18  16 16   Temp: 98.2 °F (36.8 °C)  98.4 °F (36.9 °C)    TempSrc:   Oral    SpO2: 97%  91% 92%   Weight:  229 lb 8 oz (104.1 kg)     Height:           Physical Exam:  General-No Acute Distress  Neck- supple, no JVD  CV- regular rate and rhythm no MRG  Lung- clear bilaterally  Abd- soft, nontender, nondistended  Ext- no edema bilaterally. Skin- warm and dry    Data Review:     Lab Results   Component Value Date/Time     01/05/2023 04:33 AM    K 3.8 01/05/2023 04:33 AM     01/05/2023 04:33 AM    CO2 34 01/05/2023 04:33 AM    BUN 18 01/05/2023 04:33 AM    CREATININE 0.80 01/05/2023 04:33 AM    GLUCOSE 143 01/05/2023 04:33 AM    CALCIUM 8.2 01/05/2023 04:33 AM         Lab Results   Component Value Date    WBC 9.2 01/04/2023    HGB 9.8 (L) 01/04/2023    HCT 29.7 (L) 01/04/2023    MCV 91.1 01/04/2023     01/04/2023 12/29/22    TRANSTHORACIC ECHOCARDIOGRAM (TTE) COMPLETE (CONTRAST/BUBBLE/3D PRN) 12/30/2022  4:41 PM (Final)    Interpretation Summary    Left Ventricle: Low normal left ventricular systolic function with a visually estimated EF of 50 - 55%. Left ventricle size is normal. Mildly increased wall thickness. psm. Normal wall motion. Normal diastolic function. Mitral Valve: Mildly calcified leaflet, at the posterior leaflet. Tricuspid Valve: Mild regurgitation.     Signed by: Brigid Anderson MD on 12/30/2022  4:41 PM     Assessment/Plan:       Principal Problem:    COPD exacerbation ([facility]    Active Problems:    ICD (implantable cardioverter-defibrillator) in place      Influenza    Atrial fibrillation ([facility]  -Paroxysmal-currently in atrial paced rhythm-maintained in sinus rhythm with amiodarone, anticoagulated with Eliquis for thromboembolic prophylaxis      COPD (chronic obstructive pulmonary disease) ([facility]  -Per primary    DM (diabetes mellitus) type II, controlled, with peripheral vascular disorder ([facility] [facility]  -Per primary    -Essential hypertension  Continue carvedilol     Bacteremia-alpha strep-plans for DIRK today    Update-DIRK showed no evidence of vegetations. Preserved LV function. We will sign off at this time. Please do not hesitate to contact us if needed.     Myranda Harden MD  1/5/2023 11:41 AM

## 2023-01-05 NOTE — PROGRESS NOTES
Infectious Disease Progress Note    Today's Date: 2023   Admit Date: 2022    Impression:   Strep gallolyticus/Coag-negative Staph bacteremia (22) -  and  BCX pending. DIRK negative  Left heel wound: wound care following  Ischemic cardiomyopathy with ICD in place  Culture-negative Lumbar discitis - treated from  -  with vanc/ceftriaxone  Lumbar compression fractures (L2/L4) s/p kyphoplasty, 22  Influenza A, s/p treatment/asymptomatic  Atrial fibrillation on Eliquis  Peripheral vascular disease  COPD  Type 2 DM    Plan:   Continue IV Vancomycin and Ceftriaxone--review duration with ID MD   Dispo: home is planned, he has done IV antbx at home and at SNF previously; wife will be able to assist     Anti-infectives:   IV vanc  IV ceftriaxone    Subjective: Interval history:     Returned from DIRK, on NC O2 without acute complaints. Reviewed home IV antbx and he feels comfortable with the idea. Allergies   Allergen Reactions    Acetaminophen Hives     Per spouse has small amount of hives, takes 1/2 and tolerates     Azithromycin Hives    Morphine Hallucinations     Muscle twitching. jerking    Oxaprozin Other (See Comments)     ELEVATED BLOOD PRESSURE    Oxycodone Anxiety        Review of Systems:  A comprehensive review of systems is negative except as stated above.       Objective:     Visit Vitals  /62   Pulse 60   Temp 97.7 °F (36.5 °C) (Oral)   Resp 18   Ht 6' 1\" (1.854 m)   Wt 229 lb 8 oz (104.1 kg)   SpO2 95%   BMI 30.28 kg/m²     Temp (24hrs), Av.2 °F (36.8 °C), Min:97.7 °F (36.5 °C), Max:98.6 °F (37 °C)       Patient examined 2023, exam remains unchanged except as noted below:    General:  Alert, cooperative, no acute distress   Head:  Normocephalic, atraumatic    Eyes:  Anicteric, no drainage/not injected   Throat: Mucus membranes moist OP clear   Lungs:   Clear throughout lung fields, no increased work of breathing, NC 2L, rales that clear with cough   Heart: Regular rate and rhythm, without murmur, left chest ICD   Abdomen:   Soft, non-tender, no guarding, no distention, bowel sounds active   Extremities: Extremities without edema, pulses palpable   Skin: Skin without rash     Lines/Devices: PIV            Data Review:     CBC:  Recent Labs     01/04/23  0600   WBC 9.2   HGB 9.8*   HCT 29.7*            BMP:  Recent Labs     01/03/23  0412 01/04/23  0600 01/05/23  0433   BUN 22 20 18    138 138   K 3.5 4.1 3.8    101 102   CO2 35* 35* 34*         LFTS:  No results for input(s): ALT, TP, ALB in the last 72 hours.     Invalid input(s): TBILI, SGOT, AP    Microbiology:   Reviewed    Imaging:   Reviewed     Signed By: Juliana Montiel NP, APRN - CNP     January 5, 2023

## 2023-01-05 NOTE — PROGRESS NOTES
Hospitalist Progress Note   Admit Date:  2022 10:02 AM   Name:  Rudy Cosby   Age:  65 y.o. Sex:  male  :  1945   MRN:  [id number]   Room:  821/01    Presenting Complaint: Shortness of Breath     Reason(s) for Admission: Hypoxia [R09.02]  COPD exacerbation ([facility] [address] [J44.1]  Congestive heart failure, unspecified HF chronicity, unspecified heart failure type [facility]) [I50.9]     Hospital Course:   Mr. Paulino Venegas is a nice 64 y/o WM with a h/o ischemic CM, chronic sCHF, SHERI, PAD, COPD, HTN, DM2, HLD and atrial fib who was admitted to our service on  with acute hypoxemic respiratory failure likely due to acute influenza with suspected COPDe. He was started on supplemental O2, steroids, Tamiflu and nebulizers. Blood cultures grew alpha strep and CoNS. He is on ceftriaxone. Weaned off O2. ID consulted , vancomycin added, remains on CTX. Repeat cxs neg. Cardiology consulted 1/3 for DIRK, to be done . Subjective & 24hr Events (23): Feels well, no chest pain or SOB. For DIRK today. Assessment & Plan:   # Bacteremia due to alpha streptococcus, CoNS              - Suspect coag neg staph is a contaminant since only 1 bottle. Strep is in both bottles of 1 set. Repeat blood cultures are negative to date.               - Rocephin started on . No consolidation on CXR. No recent dental work. L heel wound healing well. ID consulted  and vancomycin added. DIRK planned with Cardiology . Final abx plans pending. # Acute hypoxemic respiratory failure 2/2 influenza              - CXR w/o consolidation. S/p Tamiflu and prednisone. Off flu precautions . # HypoK              - Resolved. # CAD // chronic sCHF              - Euvolemic. Con't BB, Plavix,      # Atrial fibrillation              - Con't Eliquis, amio, Coreg     # DM2              - Hold orals. Con't SSI. Anticipated discharge needs: Pending abx plans.   Diet:  Diet NPO  DVT PPx: Eliquis  Code status: Full Code    Hospital Problems:  Principal Problem:    COPD exacerbation ([facility]  Active Problems:    ICD (implantable cardioverter-defibrillator) in place    Influenza    At[facility] fibrillation ([facility]    COPD (chronic obstructive pulmonary disease) ([facility])    DM (diabetes mellitus) type II, controlled, with peripheral vascular disorder ([facility]  Resolved Problems:    * No resolved hospital problems. *      Objective:   Patient Vitals for the past 24 hrs:   Temp Pulse Resp BP SpO2   01/05/23 0914 -- 81 16 -- 92 %   01/05/23 0728 98.4 °F (36.9 °C) 57 16 138/71 91 %   01/05/23 0258 98.2 °F (36.8 °C) 61 18 (!) 151/76 97 %   01/04/23 2315 98.6 °F (37 °C) 60 18 (!) 143/75 94 %   01/04/23 1957 98.1 °F (36.7 °C) 59 18 136/65 93 %   01/04/23 1447 98.1 °F (36.7 °C) 62 22 120/63 90 %   01/04/23 1216 97.9 °F (36.6 °C) 64 22 116/61 91 %       Oxygen Therapy  SpO2: 92 %  Pulse via Oximetry: 75 beats per minute  Pulse Oximeter Device Mode: Intermittent  O2 Device: Nasal cannula  O2 Flow Rate (L/min): 2 L/min    Estimated body mass index is 30.28 kg/m² as calculated from the following:    Height as of this encounter: 6' 1\" (1.854 m). Weight as of this encounter: 229 lb 8 oz (104.1 kg). Intake/Output Summary (Last 24 hours) at 1/5/2023 1120  Last data filed at 1/5/2023 1020  Gross per 24 hour   Intake 0 ml   Output 1350 ml   Net -1350 ml         Physical Exam:   Blood pressure 138/71, pulse 81, temperature 98.4 °F (36.9 °C), temperature source Oral, resp. rate 16, height 6' 1\" (1.854 m), weight 229 lb 8 oz (104.1 kg), SpO2 92 %. General:    Well nourished. Pleasant, awake, appears stated age. Head:  Normocephalic, atraumatic  Eyes:  Sclerae appear normal.  Pupils equally round. ENT:  Nares appear normal.  Moist oral mucosa  Neck:  No restricted ROM. Trachea midline   CV:   RRR. No m/r/g. No jugular venous distension. Lungs:   B/l rhonchi, no wheezes or rales. 2L NC O2. Abdomen:   Soft, nontender, nondistended.   Extremities: No cyanosis or clubbing. No edema  Skin:     No rashes and normal coloration. Warm and dry. Neuro:  CN II-XII grossly intact. Sensation intact. Psych:  Normal mood and affect. I have personally reviewed labs and tests showing:  Recent Labs:  Recent Results (from the past 48 hour(s))   POCT Glucose    Collection Time: 01/03/23 11:30 AM   Result Value Ref Range    POC Glucose 204 (H) 65 - 100 mg/dL    Performed by: Tracie    POCT Glucose    Collection Time: 01/03/23  4:18 PM   Result Value Ref Range    POC Glucose 320 (H) 65 - 100 mg/dL    Performed by: Mark    POCT Glucose    Collection Time: 01/03/23  8:31 PM   Result Value Ref Range    POC Glucose 292 (H) 65 - 100 mg/dL    Performed by: Saul    Vancomycin Level, Random    Collection Time: 01/04/23  6:00 AM   Result Value Ref Range    Vancomycin Rm 13.4 UG/ML   CBC    Collection Time: 01/04/23  6:00 AM   Result Value Ref Range    WBC 9.2 4.3 - 11.1 K/uL    RBC 3.26 (L) 4.23 - 5.6 M/uL    Hemoglobin 9.8 (L) 13.6 - 17.2 g/dL    Hematocrit 29.7 (L) 41.1 - 50.3 %    MCV 91.1 82 - 102 FL    MCH 30.1 26.1 - 32.9 PG    MCHC 33.0 31.4 - 35.0 g/dL    RDW 15.3 (H) 11.9 - 14.6 %    Platelets 206 533 - 006 K/uL    MPV 11.0 9.4 - 12.3 FL    nRBC 0.00 0.0 - 0.2 K/uL   Basic Metabolic Panel w/ Reflex to MG    Collection Time: 01/04/23  6:00 AM   Result Value Ref Range    Sodium 138 133 - 143 mmol/L    Potassium 4.1 3.5 - 5.1 mmol/L    Chloride 101 101 - 110 mmol/L    CO2 35 (H) 21 - 32 mmol/L    Anion Gap 2 2 - 11 mmol/L    Glucose 160 (H) 65 - 100 mg/dL    BUN 20 8 - 23 MG/DL    Creatinine 0.90 0.8 - 1.5 MG/DL    Est, Glom Filt Rate >60 >60 ml/min/1.73m2    Calcium 8.3 8.3 - 10.4 MG/DL   POCT Glucose    Collection Time: 01/04/23  7:36 AM   Result Value Ref Range    POC Glucose 161 (H) 65 - 100 mg/dL    Performed by:  Tracie    PLEASE READ & DOCUMENT PPD TEST IN 48 HRS    Collection Time: 01/04/23 12:15 PM   Result Value Ref Range    PPD, (POC) Negative Negative    mm Induration 0 0 - 5 mm   POCT Glucose    Collection Time: 01/04/23 12:18 PM   Result Value Ref Range    POC Glucose 370 (H) 65 - 100 mg/dL    Performed by: Tracie    POCT Glucose    Collection Time: 01/04/23  4:19 PM   Result Value Ref Range    POC Glucose 269 (H) 65 - 100 mg/dL    Performed by: Amira Orozco    POCT Glucose    Collection Time: 01/04/23  8:28 PM   Result Value Ref Range    POC Glucose 232 (H) 65 - 100 mg/dL    Performed by: Saul    Basic Metabolic Panel w/ Reflex to MG    Collection Time: 01/05/23  4:33 AM   Result Value Ref Range    Sodium 138 133 - 143 mmol/L    Potassium 3.8 3.5 - 5.1 mmol/L    Chloride 102 101 - 110 mmol/L    CO2 34 (H) 21 - 32 mmol/L    Anion Gap 2 2 - 11 mmol/L    Glucose 143 (H) 65 - 100 mg/dL    BUN 18 8 - 23 MG/DL    Creatinine 0.80 0.8 - 1.5 MG/DL    Est, Glom Filt Rate >60 >60 ml/min/1.73m2    Calcium 8.2 (L) 8.3 - 10.4 MG/DL   POCT Glucose    Collection Time: 01/05/23  7:28 AM   Result Value Ref Range    POC Glucose 136 (H) 65 - 100 mg/dL    Performed by: Sulma        I have personally reviewed imaging studies showing: Other Studies:  CT HEAD WO CONTRAST   Final Result   1. No acute intracranial process evident by noncontrast CT study of the head. 2.  Moderate right mastoid effusion with fluid entering the right middle ear. This can been indicator for right otomastoiditis. Recommend clinical   correlation. This report was made using voice transcription. Despite my best efforts to avoid   any, transcription errors may persist. If there is any question about the   accuracy of the report or need for clarification, then please call [phone], or text me through [email or web address] for clarification or correction. XR CHEST PORTABLE   Final Result   1. Only mild cardiomegaly seen which does appear improved from the prior study.    Some component of early or mild heart failure is difficult to exclude given the   patient's acute symptoms, however. No potential acute process is otherwise   suggested.           Current Meds:  Current Facility-Administered Medications   Medication Dose Route Frequency    predniSONE (DELTASONE) tablet 20 mg  20 mg Oral Daily    vancomycin (VANCOCIN) 1,000 mg in sodium chloride 0.9 % 250 mL IVPB ([id number])  1,000 mg IntraVENous Q12H    cefTRIAXone (ROCEPHIN) 2,000 mg in sodium chloride 0.9 % 50 mL IVPB mini-bag  2,000 mg IntraVENous Q24H    sodium chloride flush 0.9 % injection 5-40 mL  5-40 mL IntraVENous 2 times per day    sodium chloride flush 0.9 % injection 5-40 mL  5-40 mL IntraVENous PRN    0.9 % sodium chloride infusion   IntraVENous PRN    ondansetron (ZOFRAN-ODT) disintegrating tablet 4 mg  4 mg Oral Q8H PRN    Or    ondansetron (ZOFRAN) injection 4 mg  4 mg IntraVENous Q6H PRN    polyethylene glycol (GLYCOLAX) packet 17 g  17 g Oral Daily PRN    amiodarone (CORDARONE) tablet 200 mg  200 mg Oral Daily    apixaban (ELIQUIS) tablet 5 mg  5 mg Oral Q12H    carvedilol (COREG) tablet 25 mg  25 mg Oral BID WC    clopidogrel (PLAVIX) tablet 75 mg  75 mg Oral Daily    latanoprost (XALATAN) 0.005 % ophthalmic solution 1 drop  1 drop Both Eyes Nightly    magnesium oxide (MAG-OX) tablet 400 mg  400 mg Oral Daily    montelukast (SINGULAIR) tablet 10 mg  10 mg Oral Nightly    rosuvastatin (CRESTOR) tablet 10 mg  10 mg Oral Nightly    [Held by provider] valsartan (DIOVAN) tablet 80 mg  80 mg Oral Daily    glucose chewable tablet 16 g  4 tablet Oral PRN    dextrose bolus 10% 125 mL  125 mL IntraVENous PRN    Or    dextrose bolus 10% 250 mL  250 mL IntraVENous PRN    glucagon (rDNA) injection 1 mg  1 mg SubCUTAneous PRN    dextrose 10 % infusion   IntraVENous Continuous PRN    insulin lispro (HUMALOG) injection vial 0-8 Units  0-8 Units SubCUTAneous TID WC    insulin lispro (HUMALOG) injection vial 0-4 Units  0-4 Units SubCUTAneous Nightly    albuterol (PROVENTIL) nebulizer solution 2.5 mg  2.5 mg Nebulization Q6H PRN    tiotropium (SPIRIVA RESPIMAT) 2.5 MCG/ACT inhaler 2 puff  2 puff Inhalation Daily    potassium chloride (KLOR-CON M) extended release tablet 40 mEq  40 mEq Oral Once    furosemide (LASIX) tablet 40 mg  40 mg Oral Daily       Signed:  Gerda Epley, MD    Part of this note may have been written by using a voice dictation software. The note has been proof read but may still contain some grammatical/other typographical errors.

## 2023-01-05 NOTE — PROGRESS NOTES
VANCO DAILY FOLLOW UP NOTE  [address] [facility] Pharmacokinetic Monitoring Service - Vancomycin    Consulting Provider: Dr. Jones Ross   Indication: ICD infection  Target Concentration: Goal AUC/GIOVANNY 400-600 mg*hr/L  Day of Therapy: 4  Additional Antimicrobials: ceftriaxone     Patient eligible for piperacillin-tazobactam to cefepime auto-substitution per P&T approved protocol? NO    Pertinent Laboratory Values: Wt Readings from Last 1 Encounters:   01/05/23 229 lb 8 oz (104.1 kg)     Temp Readings from Last 1 Encounters:   01/05/23 98.4 °F (36.9 °C) (Oral)     Recent Labs     01/03/23  0412 01/04/23  0600 01/05/23  0433   BUN 22 20 18   CREATININE 0.90 0.90 0.80   WBC  --  9.2  --      Estimated Creatinine Clearance: 96 mL/min (based on SCr of 0.8 mg/dL). Lab Results   Component Value Date/Time    VANCOTROUGH 12.2 09/07/2022 09:15 AM    VANCORANDOM 13.4 01/04/2023 06:00 AM     MRSA Nasal Swab: N/A.  Non-respiratory infection    Assessment:  Date/Time Dose Concentration AUC   1/4 0600 1000 mg q12h 13.4 439   Note: Serum concentrations collected for AUC dosing may appear elevated if collected in close proximity to the dose administered, this is not necessarily an indication of toxicity    Plan:  Current dosing regimen is therapeutic  Continue current dose  Repeat vancomycin concentrations will be ordered as clinically appropriate   Pharmacy will continue to monitor patient and adjust therapy as indicated    Thank you for the consult,  Ashok Bravo [facility]

## 2023-01-05 NOTE — PROGRESS NOTES
DIRK by Dr Deedee Robledo  II ASA II Mallampati  3mg versed  25mcg fentanyl  Viscous Solution given at 1145. Patient can eat at 1345. Pt tolerated well.

## 2023-01-06 VITALS
BODY MASS INDEX: 30.77 KG/M2 | HEIGHT: 73 IN | HEART RATE: 61 BPM | RESPIRATION RATE: 18 BRPM | OXYGEN SATURATION: 93 % | TEMPERATURE: 98.4 F | DIASTOLIC BLOOD PRESSURE: 65 MMHG | SYSTOLIC BLOOD PRESSURE: 113 MMHG | WEIGHT: 232.14 LBS

## 2023-01-06 PROBLEM — R78.81 BACTEREMIA DUE TO STREPTOCOCCUS: Status: RESOLVED | Noted: 2023-01-05 | Resolved: 2023-01-06

## 2023-01-06 PROBLEM — B95.5 BACTEREMIA DUE TO STREPTOCOCCUS: Status: RESOLVED | Noted: 2023-01-05 | Resolved: 2023-01-06

## 2023-01-06 LAB
CK SERPL-CCNC: 22 U/L (ref 21–215)
GLUCOSE BLD STRIP.AUTO-MCNC: 148 MG/DL (ref 65–100)
GLUCOSE BLD STRIP.AUTO-MCNC: 206 MG/DL (ref 65–100)
SERVICE CMNT-IMP: ABNORMAL
SERVICE CMNT-IMP: ABNORMAL

## 2023-01-06 PROCEDURE — 6360000002 HC RX W HCPCS: Performed by: INTERNAL MEDICINE

## 2023-01-06 PROCEDURE — 6370000000 HC RX 637 (ALT 250 FOR IP): Performed by: INTERNAL MEDICINE

## 2023-01-06 PROCEDURE — A4216 STERILE WATER/SALINE, 10 ML: HCPCS | Performed by: NURSE PRACTITIONER

## 2023-01-06 PROCEDURE — 82962 GLUCOSE BLOOD TEST: CPT

## 2023-01-06 PROCEDURE — 2700000000 HC OXYGEN THERAPY PER DAY

## 2023-01-06 PROCEDURE — 36415 COLL VENOUS BLD VENIPUNCTURE: CPT

## 2023-01-06 PROCEDURE — 94640 AIRWAY INHALATION TREATMENT: CPT

## 2023-01-06 PROCEDURE — 2580000003 HC RX 258: Performed by: NURSE PRACTITIONER

## 2023-01-06 PROCEDURE — 2580000003 HC RX 258: Performed by: INTERNAL MEDICINE

## 2023-01-06 PROCEDURE — 6360000002 HC RX W HCPCS: Performed by: NURSE PRACTITIONER

## 2023-01-06 PROCEDURE — 82550 ASSAY OF CK (CPK): CPT

## 2023-01-06 RX ADMIN — CLOPIDOGREL BISULFATE 75 MG: 75 TABLET ORAL at 10:27

## 2023-01-06 RX ADMIN — PREDNISONE 20 MG: 20 TABLET ORAL at 10:26

## 2023-01-06 RX ADMIN — INSULIN LISPRO 2 UNITS: 100 INJECTION, SOLUTION INTRAVENOUS; SUBCUTANEOUS at 11:40

## 2023-01-06 RX ADMIN — FUROSEMIDE 40 MG: 40 TABLET ORAL at 10:27

## 2023-01-06 RX ADMIN — TIOTROPIUM BROMIDE INHALATION SPRAY 2 PUFF: 3.12 SPRAY, METERED RESPIRATORY (INHALATION) at 08:51

## 2023-01-06 RX ADMIN — AMIODARONE HYDROCHLORIDE 200 MG: 200 TABLET ORAL at 10:27

## 2023-01-06 RX ADMIN — DAPTOMYCIN 850 MG: 500 INJECTION, POWDER, LYOPHILIZED, FOR SOLUTION INTRAVENOUS at 14:04

## 2023-01-06 RX ADMIN — MAGNESIUM GLUCONATE 500 MG ORAL TABLET 400 MG: 500 TABLET ORAL at 10:28

## 2023-01-06 RX ADMIN — APIXABAN 5 MG: 5 TABLET, FILM COATED ORAL at 10:27

## 2023-01-06 RX ADMIN — CARVEDILOL 25 MG: 25 TABLET, FILM COATED ORAL at 10:27

## 2023-01-06 RX ADMIN — CEFTRIAXONE SODIUM 2000 MG: 2 INJECTION, POWDER, FOR SOLUTION INTRAMUSCULAR; INTRAVENOUS at 10:29

## 2023-01-06 ASSESSMENT — PAIN SCALES - GENERAL: PAINLEVEL_OUTOF10: 0

## 2023-01-06 NOTE — PROGRESS NOTES
VANCO DAILY FOLLOW UP NOTE  [address] [facility] Pharmacokinetic Monitoring Service - Vancomycin    Consulting Provider: Dr. Sarahi Pride   Indication: ICD infection  Target Concentration: Goal AUC/GIOVANNY 400-600 mg*hr/L  Duration of Therapy: EOT 1/16 per ID  Additional Antimicrobials: ceftriaxone     Patient eligible for piperacillin-tazobactam to cefepime auto-substitution per P&T approved protocol? NO    Pertinent Laboratory Values: Wt Readings from Last 1 Encounters:   01/06/23 232 lb 2.3 oz (105.3 kg)     Temp Readings from Last 1 Encounters:   01/06/23 97.5 °F (36.4 °C) (Oral)     Recent Labs     01/04/23  0600 01/05/23  0433   BUN 20 18   CREATININE 0.90 0.80   WBC 9.2  --      Estimated Creatinine Clearance: 97 mL/min (based on SCr of 0.8 mg/dL). Lab Results   Component Value Date/Time    VANCOTROUGH 12.2 09/07/2022 09:15 AM    VANCORANDOM 13.4 01/04/2023 06:00 AM     MRSA Nasal Swab: N/A.  Non-respiratory infection    Assessment:  Date/Time Dose Concentration AUC   1/4 0600 1000 mg q12h 13.4 439   Note: Serum concentrations collected for AUC dosing may appear elevated if collected in close proximity to the dose administered, this is not necessarily an indication of toxicity    Plan:  Current dosing regimen is therapeutic  Continue current dose  Repeat vancomycin concentrations will be ordered as clinically appropriate   Pharmacy will continue to monitor patient and adjust therapy as indicated    Thank you for the consult,  Gonzalo Barbosa, [facility]

## 2023-01-06 NOTE — CARE COORDINATION
01/06/23 [address] Discharge None   [employer] Information Provided? No   Mode of Transport at Discharge Other (see comment)   Confirm Follow Up Transport Family   Condition of Participation: Discharge Planning   The Patient and/or Patient Representative was provided with a Choice of Provider? Patient   The Patient and/Or Patient Representative agree with the Discharge Plan? Yes   Freedom of Choice list was provided with basic dialogue that supports the patient's individualized plan of care/goals, treatment preferences, and shares the quality data associated with the providers? Yes   Patient is discharging home. Patient will follow up at the infusion center tomorrow at 1:30. Patient will get his infusion schedule tomorrow for the duration of his treatment.

## 2023-01-06 NOTE — DISCHARGE SUMMARY
Hospitalist Discharge Summary   Admit Date:  2022 10:02 AM   DC Note date: 2023  Name:  Yann Nascimento   Age:  65 y.o. Sex:  male  :  1945   MRN:  [id number]   Room:  [facility]  PCP:  Antonio Correa MD    Presenting Complaint: Shortness of Breath     Initial Admission Diagnosis: Hypoxia [R09.02]  COPD exacerbation ([address] [J44.1]  Congestive heart failure, unspecified HF chronicity, unspecified heart failure type ([address] [I50.9]     Problem List for this Hospitalization (present on admission):    Principal Problem:    COPD exacerbation ([address]  Active Problems:    ICD (implantable cardioverter-defibrillator) in place    Influenza    Atrial fibrillation ([address]    COPD (chronic obstructive pulmonary disease) ([address]    DM (diabetes mellitus) type II, controlled, with peripheral vascular disorder ([address]  Resolved Problems:    Bacteremia due to Streptococcus      Hospital Course:  Mr. Jerica Abdul is a nice 66 y/o WM with a h/o ischemic CM, chronic sCHF, SHERI, PAD, COPD, HTN, DM2, HLD and atrial fib who was admitted to our service on  with acute hypoxemic respiratory failure likely due to acute influenza with suspected COPDe. He was started on supplemental O2, steroids, Tamiflu and nebulizers. Blood cultures grew alpha strep and CoNS. He is on ceftriaxone. Weaned off O2. ID consulted , vancomycin added, remains on CTX. Repeat cxs neg. Cardiology consulted 1/3, normal DIRK on . He completed steroids and Tamiflu and was taken off precautions on . Final ID recommendations as noted below. Will do outpatient infusions at infusion center with PIV so PICC cancelled today. Will need to stop Crestor while on daptomycin so can resume this on  (not discontinued on MAR because that would cancel his prescription entirely), I discussed this with patient and wife and they understand; resume as early as 23 assuming no plans to extend abx. He is medically stable for discharge home today.     ID abx recommendations:  Start Daptomycin 8mg/kg 850mg IV Q24hrs: check baseline CK, stop Crestor while on Daptomycin: EOT 1/16/23    Disposition: Home with HH  Diet: ADULT DIET; Regular; 4 carb choices (60 gm/meal)  Code Status: Full Code    Follow Ups:  PCP    Time spent in patient discharge and coordination 35 minutes. Follow up labs/diagnostics (ultimately defer to outpatient provider): Above    Plan was discussed with patient and wife, RN, CM. All questions answered. Patient was stable at time of discharge. Instructions given to call a physician or return if any concerns. Current Discharge Medication List        CONTINUE these medications which have NOT CHANGED    Details   albuterol (PROVENTIL) (2.5 MG/3ML) 0.083% nebulizer solution 1 vial via nebulizer 4 times daily if needed for shortness of breath or wheezing. Diagnosis-COPD, J44.9. Bill to Medicare part B  Qty: 360 mL, Refills: 11      carvedilol (COREG) 25 MG tablet Take 1 tablet by mouth 2 times daily (with meals)  Qty: 60 tablet, Refills: 11      senna-docusate (PERICOLACE) 8.6-50 MG per tablet Take 1 tablet by mouth nightly      valsartan (DIOVAN) 80 MG tablet Take 1 tablet by mouth daily  Qty: 30 tablet, Refills: 5    Associated Diagnoses: Primary hypertension      CPAP Machine MISC by Does not apply route      albuterol sulfate HFA (PROVENTIL;VENTOLIN;PROAIR) 108 (90 Base) MCG/ACT inhaler USE 2 PUFFS BY INHALATION 4 TIMES DAILY AS NEEDED FOR WHEEZING OR SHORTNESS OF BREATH. Patient prefers ventolin. Qty: 18 g, Refills: 11      GUAIFENESIN 1200 PO Take 1 tablet by mouth every 12 hours as needed      tamsulosin (FLOMAX) 0.4 MG capsule Take 0.4 mg by mouth daily      traMADol (ULTRAM) 50 MG tablet Take 50 mg by mouth every 6 hours as needed for Pain.  Taking 1/2 every morning      acetaminophen (TYLENOL) 500 MG tablet Take 500 mg by mouth every 6 hours as needed for Pain Taking 1/2 two times daily      glipiZIDE (GLUCOTROL XL) 10 MG extended release tablet Take 1 tablet by mouth daily  Qty: 30 tablet, Refills: 0      furosemide (LASIX) 20 MG tablet Take 1 tablet by mouth in the morning. Qty: 60 tablet, Refills: 3      rosuvastatin (CRESTOR) 10 MG tablet TAKE 1 TABLET BY MOUTH EVERY DAY  Qty: 90 tablet, Refills: 0    Comments: * * N O T I C E * * Last quantity doesn't match original quantity      fluticasone-salmeterol (ADVAIR) 250-50 MCG/ACT AEPB diskus inhaler Inhale 1 puff into the lungs in the morning and at bedtime      amiodarone (CORDARONE) 200 MG tablet Take 200 mg by mouth daily      apixaban (ELIQUIS) 5 MG TABS tablet Take 5 mg by mouth every 12 hours      ascorbic acid (VITAMIN C) 500 MG tablet Take 500 mg by mouth      Cholecalciferol 50 MCG (2000 UT) TABS Take 2,000 Units by mouth      clopidogrel (PLAVIX) 75 MG tablet Take 75 mg by mouth daily      fluticasone (FLONASE) 50 MCG/ACT nasal spray USE 1 OR 2 SPRAYS IN EACH NOSTRIL EVERY DAY.       latanoprost (XALATAN) 0.005 % ophthalmic solution Apply 1 drop to eye nightly      magnesium oxide (MAG-OX) 400 (240 Mg) MG tablet TAKE 1 TABLET BY MOUTH EVERY DAY      montelukast (SINGULAIR) 10 MG tablet TAKE 1 TABLET BY MOUTH EVERY DAY AT BEDTIME      nitroGLYCERIN (NITROSTAT) 0.4 MG SL tablet Place 0.4 mg under the tongue           STOP taking these medications       predniSONE 10 MG (21) TBPK Comments:   Reason for Stopping:         atropine 1 % ophthalmic solution Comments:   Reason for Stopping:         amLODIPine (NORVASC) 5 MG tablet Comments:   Reason for Stopping:         QUEtiapine (SEROQUEL) 25 MG tablet Comments:   Reason for Stopping:         metFORMIN (GLUCOPHAGE) 500 MG tablet Comments:   Reason for Stopping:         polyethylene glycol (GLYCOLAX) 17 GM/SCOOP powder Comments:   Reason for Stopping:               Procedures done this admission:  * No surgery found *    Consults this admission:  IP WOUND CARE NURSE CONSULT TO EVAL  IP CONSULT TO PHARMACY  IP CONSULT TO INFECTIOUS DISEASES  IP CONSULT TO PHARMACY  IP CONSULT TO CARDIOLOGY  IP CONSULT TO CASE MANAGEMENT    Echocardiogram results:  12/29/22    TRANSESOPHAGEAL ECHOCARDIOGRAM (CONTRAST/3D PRN) 01/05/2023  4:33 PM (Final)    Interpretation Summary    Left Ventricle: Normal left ventricular systolic function with a visually estimated EF of 55 - 60%. Left ventricle size is normal. Mildly increased wall thickness. Findings consistent with mild concentric hypertrophy. Normal wall motion. Normal diastolic function for age. Mitral Valve: Mild regurgitation with a centrally directed jet. Left Atrium: Left atrium is moderately dilated.    -Atrial paced rhythm noted during the study. No valvular vegetations noted. Signed by: Muna Cabrera MD on 1/5/2023  4:33 PM      Diagnostic Imaging/Tests:   CT HEAD WO CONTRAST    Result Date: 12/29/2022  1. No acute intracranial process evident by noncontrast CT study of the head. 2.  Moderate right mastoid effusion with fluid entering the right middle ear. This can been indicator for right otomastoiditis. Recommend clinical correlation. This report was made using voice transcription. Despite my best efforts to avoid any, transcription errors may persist. If there is any question about the accuracy of the report or need for clarification, then please call [phone], or text me through perfectserv for clarification or correction. XR CHEST PORTABLE    Result Date: 12/29/2022  1. Only mild cardiomegaly seen which does appear improved from the prior study. Some component of early or mild heart failure is difficult to exclude given the patient's acute symptoms, however. No potential acute process is otherwise suggested.        Labs: Results:       BMP, Mg, Phos Recent Labs     01/04/23  0600 01/05/23  0433    138   K 4.1 3.8    102   CO2 35* 34*   ANIONGAP 2 2   BUN 20 18   CREATININE 0.90 0.80   LABGLOM >60 >60   CALCIUM 8.3 8.2*   GLUCOSE 160* 143*      CBC Recent Labs     01/04/23  0600   WBC 9.2   RBC 3.26*   HGB 9.8*   HCT 29.7*   MCV 91.1   MCH 30.1   MCHC 33.0   RDW 15.3*      MPV 11.0   NRBC 0.00      LFT No results for input(s): BILITOT, BILIDIR, ALKPHOS, AST, ALT, PROT, LABALBU, GLOB in the last 72 hours.    Cardiac  Lab Results   Component Value Date/Time    NTPROBNP 10,883 12/29/2022 10:18 AM    TROPHS 44.8 12/29/2022 12:59 PM    TROPHS 51.7 12/29/2022 10:18 AM    TROPHS 18.1 08/10/2022 08:52 AM      Coags Lab Results   Component Value Date/Time    PROTIME 16.3 07/30/2022 09:53 AM    PROTIME 15.6 03/04/2022 03:57 PM    PROTIME 14.7 12/31/2020 08:23 PM    INR 1.3 07/30/2022 09:53 AM    INR 1.2 03/04/2022 03:57 PM    INR 1.1 12/31/2020 08:23 PM    APTT 63.6 08/02/2022 05:41 AM    APTT 33.8 08/01/2022 09:21 PM    APTT 72.5 08/01/2022 06:46 AM    APTT 24.3 03/04/2022 03:57 PM      A1c Lab Results   Component Value Date/Time    LABA1C 5.6 12/29/2022 10:18 AM    LABA1C 6.0 08/10/2022 08:52 AM    LABA1C 5.3 07/27/2022 11:46 PM     12/29/2022 10:18 AM     08/10/2022 08:52 AM     07/27/2022 11:46 PM      Lipids Lab Results   Component Value Date/Time    CHOL 77 03/05/2022 06:14 AM    LDLCALC 16.4 03/05/2022 06:14 AM    LABVLDL 15.6 03/05/2022 06:14 AM    HDL 45 03/05/2022 06:14 AM    CHOLHDLRATIO 1.7 03/05/2022 06:14 AM    TRIG 78 03/05/2022 06:14 AM      Thyroid  Lab Results   Component Value Date/Time    TSHELE 0.34 12/29/2022 12:59 PM    ZLZ3LJH 1.010 08/09/2022 07:17 AM        Most Recent UA Lab Results   Component Value Date/Time    COLORU YELLOW/STRAW 08/24/2022 05:30 PM    APPEARANCE CLEAR 08/24/2022 05:30 PM    SPECGRAV 1.010 08/24/2022 05:30 PM    LABPH 6.0 08/24/2022 05:30 PM    PROTEINU Negative 08/24/2022 05:30 PM    GLUCOSEU Negative 12/29/2022 12:26 PM    GLUCOSEU Negative 08/24/2022 05:30 PM    KETUA Negative 08/24/2022 05:30 PM    BILIRUBINUR Negative 08/24/2022 05:30 PM    BILIRUBINUR Negative 03/07/2022 08:59 PM    BLOODU Negative 12/29/2022 12:26 PM    BLOODU Negative 08/24/2022 05:30 PM    UROBILINOGEN 0.2 08/24/2022 05:30 PM    NITRU Negative 08/24/2022 05:30 PM    LEUKOCYTESUR SMALL 08/24/2022 05:30 PM    WBCUA 5-10 08/24/2022 05:30 PM    RBCUA 0-5 08/24/2022 05:30 PM    EPITHUA 0-5 08/24/2022 05:30 PM    BACTERIA Negative 08/24/2022 05:30 PM    LABCAST 0-2 08/24/2022 05:30 PM        Recent Labs     01/02/23  1129 01/02/23  1123 12/30/22  1603 12/29/22  1831 12/29/22  1018   CULTURE NO GROWTH 4 DAYS NO GROWTH 4 DAYS NO GROWTH 5 DAYS STAPHYLOCOCCUS HOMINIS This organism may be indicative of culture contamination. Clinical correlation needs to be evaluated as each case is unique. * STREPTOCOCCUS GALLOLYTICUS SSP PASTEURIANUS /INFANTARIUS This organism may be indicative of culture contamination. Clinical correlation needs to be evaluated as each case is unique. *  Refer to Blood Culture ID Panel Accession [id number]       All Labs from Last 24 Hrs:  Recent Results (from the past 24 hour(s))   POCT Glucose    Collection Time: 01/05/23  4:37 PM   Result Value Ref Range    POC Glucose 358 (H) 65 - 100 mg/dL    Performed by: Sulma    POCT Glucose    Collection Time: 01/05/23  8:37 PM   Result Value Ref Range    POC Glucose 230 (H) 65 - 100 mg/dL    Performed by: Andree    POCT Glucose    Collection Time: 01/06/23  7:27 AM   Result Value Ref Range    POC Glucose 148 (H) 65 - 100 mg/dL    Performed by: Chanel    POCT Glucose    Collection Time: 01/06/23 11:19 AM   Result Value Ref Range    POC Glucose 206 (H) 65 - 100 mg/dL    Performed by: Barrett    CK    Collection Time: 01/06/23 11:43 AM   Result Value Ref Range    Total CK 22 21 - 215 U/L       Allergies   Allergen Reactions    Acetaminophen Hives     Per spouse has small amount of hives, takes 1/2 and tolerates     Azithromycin Hives    Morphine Hallucinations     Muscle twitching. jerking    Oxaprozin Other (See Comments)     ELEVATED BLOOD PRESSURE Oxycodone Anxiety     Immunization History   Administered Date(s) Administered    COVID-19, MODERNA BLUE border, Primary or Immunocompromised, (age 12y+), IM, 100 mcg/0.5mL 03/15/2021, 04/15/2021    Influenza Trivalent 09/26/2014, 10/12/2015    Influenza Virus Vaccine 09/30/2010, 10/17/2011, 09/01/2013, 11/02/2014    Influenza, FLUAD, (age 72 y+), Adjuvanted, 0.5mL 11/02/2021    Influenza, High Dose (Fluzone 65 yrs and older) 11/17/2016, 08/11/2017, 09/13/2018, 10/07/2019, 09/30/2020    PPD Test 01/06/2021, 07/27/2022, 08/16/2022, 08/25/2022, 01/02/2023    Pneumococcal Conjugate 13-valent ([id number]) 04/10/2015    Pneumococcal Polysaccharide ([id number]) 11/27/2007, 10/17/2011    Pneumococcal Vaccine 10/17/2011    Zoster Recombinant (Shingrix) 09/06/2018, 11/27/2018       Recent Vital Data:  Patient Vitals for the past 24 hrs:   Temp Pulse Resp BP SpO2   01/06/23 1106 98.4 °F (36.9 °C) 61 18 113/65 93 %   01/06/23 0853 -- 64 18 -- 94 %   01/06/23 0726 97.5 °F (36.4 °C) 60 18 (!) 136/57 92 %   01/06/23 0349 98.2 °F (36.8 °C) 60 17 (!) 158/73 95 %   01/05/23 2347 98.4 °F (36.9 °C) 60 18 (!) 148/72 93 %   01/05/23 2000 98.8 °F (37.1 °C) 59 18 (!) 112/59 96 %   01/05/23 1551 97.7 °F (36.5 °C) 60 18 111/62 95 %       Oxygen Therapy  SpO2: 93 %  Pulse via Oximetry: 75 beats per minute  Pulse Oximeter Device Mode: Intermittent  O2 Device: Nasal cannula  O2 Flow Rate (L/min): 2 L/min    Estimated body mass index is 30.63 kg/m² as calculated from the following:    Height as of this encounter: 6' 1\" (1.854 m). Weight as of this encounter: 232 lb 2.3 oz (105.3 kg). Intake/Output Summary (Last 24 hours) at 1/6/2023 1247  Last data filed at 1/6/2023 0726  Gross per 24 hour   Intake --   Output 1350 ml   Net -1350 ml         Physical Exam:  General:    Well nourished. No overt distress  Head:  Normocephalic, atraumatic  Eyes:  Sclerae appear normal.  Pupils equally round. HENT:  Nares appear normal, no drainage.   Moist mucous membranes  Neck:  No restricted ROM. Trachea midline  CV:   RRR. No m/r/g. No JVD  Lungs:   CTAB. No wheezing, rhonchi, or rales. Respirations even, unlabored  Abdomen:   Soft, nontender, nondistended. Extremities: Warm and dry. No cyanosis or clubbing. No edema. Skin:     No rashes. Normal coloration  Neuro:  CN II-XII grossly intact. Psych:  Normal mood and affect. Signed:  Scotty Olivas MD    Part of this note may have been written by using a voice dictation software. The note has been proof read but may still contain some grammatical/other typographical errors.

## 2023-01-06 NOTE — PROGRESS NOTES
Infectious Disease Progress Note    Today's Date: 1/6/2023   Admit Date: 12/29/2022    Impression:   Strep gallolyticus/Coag-negative Staph bacteremia (12/29/22) - 12/30 and 1/2 BCX pending. DIRK negative  Left heel wound: wound care following  Ischemic cardiomyopathy with ICD in place  Culture-negative Lumbar discitis - treated from 7/30 - 9/16 with vanc/ceftriaxone  Lumbar compression fractures (L2/L4) s/p kyphoplasty, 7/20/22  Influenza A, s/p treatment/asymptomatic  Atrial fibrillation on Eliquis  Peripheral vascular disease  COPD  Type 2 DM    Plan:   Dispo: discussed with pt and wife, they are agreeable  and prefer coming to the outpatient infusion, and using peripheral IVs to complete short antbx duration  Discontinue Ceftriaxone  Discontinue Vancomycin: unable to change to once/day dose    Start Daptomycin 8mg/kg 850mg IV Q24hrs: check baseline CK, stop Crestor while on Daptomycin: EOT 1/16/23  Send OPAT orders  No ID follow up needed at EOT--but Crestor will need to be resumed  Ok to DC once arrangements made    Anti-infectives:   IV vanc 1/2-  IV ceftriaxone 1/2-1/6    Subjective: Interval history:   Seen with wife at bedside, pt reports feeling better. Breathing is better. Spent 60 min seeing patient today, > 50%  was spent in counseling, coordination of care, educated pt on Infection, ID treatment options and medication side effects/directions. Allergies   Allergen Reactions    Acetaminophen Hives     Per spouse has small amount of hives, takes 1/2 and tolerates     Azithromycin Hives    Morphine Hallucinations     Muscle twitching. jerking    Oxaprozin Other (See Comments)     ELEVATED BLOOD PRESSURE    Oxycodone Anxiety        Review of Systems:  A comprehensive review of systems is negative except as stated above.       Objective:     Visit Vitals  BP (!) 136/57   Pulse 64   Temp 97.5 °F (36.4 °C) (Oral)   Resp 18   Ht 6' 1\" (1.854 m)   Wt 232 lb 2.3 oz (105.3 kg)   SpO2 94%   BMI 30.63 kg/m² Temp (24hrs), Av.1 °F (36.7 °C), Min:97.5 °F (36.4 °C), Max:98.8 °F (37.1 °C)       Patient examined 2023, exam remains unchanged except as noted below:    General:  Alert, cooperative, no acute distress   Head:  Normocephalic, atraumatic    Eyes:  Anicteric, no drainage/not injected   Throat: Mucus membranes moist OP clear   Lungs:   Clear throughout lung fields, no increased work of breathing, NC 2L, rales that clear with cough   Heart:  Regular rate and rhythm, without murmur, left chest ICD   Abdomen:   Soft, non-tender, no guarding, no distention, bowel sounds active   Extremities: Extremities without edema, pulses palpable   Skin: Skin without rash     Lines/Devices: PIV            Data Review:     CBC:  Recent Labs     23  0600   WBC 9.2   HGB 9.8*   HCT 29.7*            BMP:  Recent Labs     23  0600 23  0433   BUN 20 18    138   K 4.1 3.8    102   CO2 35* 34*         LFTS:  No results for input(s): ALT, TP, ALB in the last 72 hours.     Invalid input(s): TBILI, SGOT, AP    Microbiology:   Reviewed    Imaging:   Reviewed     Signed By: Elroy Bhardwaj NP, APRN - CNP     2023

## 2023-01-06 NOTE — PROGRESS NOTES
Hospitalist Progress Note   Admit Date:  2022 10:02 AM   Name:  Elisa Briceno   Age:  65 y.o. Sex:  male  :  1945   MRN:  [id number]   Room:  821/01    Presenting Complaint: Shortness of Breath     Reason(s) for Admission: Hypoxia [R09.02]  COPD exacerbation ([facility] [address] [J44.1]  Congestive heart failure, unspecified HF chronicity, unspecified heart failure type [facility]) [I50.9]     Hospital Course:   Mr. Edvin Sifuentes is a nice 66 y/o WM with a h/o ischemic CM, chronic sCHF, SHERI, PAD, COPD, HTN, DM2, HLD and atrial fib who was admitted to our service on  with acute hypoxemic respiratory failure likely due to acute influenza with suspected COPDe. He was started on supplemental O2, steroids, Tamiflu and nebulizers. Blood cultures grew alpha strep and CoNS. He is on ceftriaxone. Weaned off O2. ID consulted , vancomycin added, remains on CTX. Repeat cxs neg. Cardiology consulted 1/3, normal DIRK on . Subjective & 24hr Events (23): Sleeping comfortable, DIRK unremarkable yesterday. Good appetite. No chest pain or SOB. Assessment & Plan:   # Bacteremia due to alpha streptococcus, CoNS              - Rocephin started on . No consolidation on CXR. No recent dental work. L heel wound healing well. ID consulted  and vancomycin added. DIRK unremarkable on . PICC ordered on , anticipating vanc monotherapy until . Follow up ID recs today. Will d/w CM. # Acute hypoxemic respiratory failure / influenza              - CXR w/o consolidation. S/p Tamiflu and prednisone. Off flu precautions . # HypoK              - Resolved. # CAD // chronic sCHF              - Euvolemic. Con't BB, Plavix     # Atrial fibrillation              - Con't Eliquis, amio, Coreg     # DM2              - Hold orals. Con't SSI. Anticipated discharge needs: Likely home pending finalization of abx plans, today v tomorrow? Diet:  ADULT DIET;  Regular; 4 carb choices (60 gm/meal)  DVT PPx: Eliquis  Code status: Full Code    Hospital Problems:  Principal Problem:    COPD exacerbation ([facility] [address]  Active Problems:    ICD (implantable cardioverter-defibrillator) in place    Influenza    Bacteremia due to Streptococcus    Atrial fibrillation (HCC)    COPD (chronic obstructive pulmonary disease) (HCC)    DM (diabetes mellitus) type II, controlled, with peripheral vascular disorder ([facility] [address]  Resolved Problems:    * No resolved hospital problems. *      Objective:   Patient Vitals for the past 24 hrs:   Temp Pulse Resp BP SpO2   01/06/23 0726 97.5 °F (36.4 °C) 60 18 (!) 136/57 92 %   01/06/23 0349 98.2 °F (36.8 °C) 60 17 (!) 158/73 95 %   01/05/23 2347 98.4 °F (36.9 °C) 60 18 (!) 148/72 93 %   01/05/23 2000 98.8 °F (37.1 °C) 59 18 (!) 112/59 96 %   01/05/23 1551 97.7 °F (36.5 °C) 60 18 111/62 95 %   01/05/23 0914 -- 81 16 -- 92 %       Oxygen Therapy  SpO2: 92 %  Pulse via Oximetry: 75 beats per minute  Pulse Oximeter Device Mode: Intermittent  O2 Device: Nasal cannula  O2 Flow Rate (L/min): 2 L/min    Estimated body mass index is 30.63 kg/m² as calculated from the following:    Height as of this encounter: 6' 1\" (1.854 m). Weight as of this encounter: 232 lb 2.3 oz (105.3 kg). Intake/Output Summary (Last 24 hours) at 1/6/2023 0850  Last data filed at 1/6/2023 0630  Gross per 24 hour   Intake 0 ml   Output 1100 ml   Net -1100 ml         Physical Exam:   Blood pressure (!) 136/57, pulse 60, temperature 97.5 °F (36.4 °C), temperature source Oral, resp. rate 18, height 6' 1\" (1.854 m), weight 232 lb 2.3 oz (105.3 kg), SpO2 92 %. General:    Well nourished. Comfortable in bed. Head:  Normocephalic, atraumatic  Eyes:  Sclerae appear normal.  Pupils equally round. ENT:  Nares appear normal.  Moist oral mucosa  Neck:  No restricted ROM. Trachea midline   CV:   RRR. No m/r/g. No jugular venous distension. Lungs:   CTAB. No wheezing, rhonchi, or rales. Symmetric expansion.   Abdomen:   Soft, nontender, nondistended. Extremities: No cyanosis or clubbing. No edema  Skin:     No rashes and normal coloration. Warm and dry. Neuro:  CN II-XII grossly intact. Sensation intact. Psych:  Normal mood and affect. I have personally reviewed labs and tests showing:  Recent Labs:  Recent Results (from the past 48 hour(s))   PLEASE READ & DOCUMENT PPD TEST IN 48 HRS    Collection Time: 01/04/23 12:15 PM   Result Value Ref Range    PPD, (POC) Negative Negative    mm Induration 0 0 - 5 mm   POCT Glucose    Collection Time: 01/04/23 12:18 PM   Result Value Ref Range    POC Glucose 370 (H) 65 - 100 mg/dL    Performed by:  Tracie    POCT Glucose    Collection Time: 01/04/23  4:19 PM   Result Value Ref Range    POC Glucose 269 (H) 65 - 100 mg/dL    Performed by: Jean Pierre Henriquez    POCT Glucose    Collection Time: 01/04/23  8:28 PM   Result Value Ref Range    POC Glucose 232 (H) 65 - 100 mg/dL    Performed by: Sual    Basic Metabolic Panel w/ Reflex to MG    Collection Time: 01/05/23  4:33 AM   Result Value Ref Range    Sodium 138 133 - 143 mmol/L    Potassium 3.8 3.5 - 5.1 mmol/L    Chloride 102 101 - 110 mmol/L    CO2 34 (H) 21 - 32 mmol/L    Anion Gap 2 2 - 11 mmol/L    Glucose 143 (H) 65 - 100 mg/dL    BUN 18 8 - 23 MG/DL    Creatinine 0.80 0.8 - 1.5 MG/DL    Est, Glom Filt Rate >60 >60 ml/min/1.73m2    Calcium 8.2 (L) 8.3 - 10.4 MG/DL   POCT Glucose    Collection Time: 01/05/23  7:28 AM   Result Value Ref Range    POC Glucose 136 (H) 65 - 100 mg/dL    Performed by: Sulma    Transesophageal echocardiogram (DIRK) with contrast and 3D PRN    Collection Time: 01/05/23 12:25 PM   Result Value Ref Range    Body Surface Area 2.29 m2   POCT Glucose    Collection Time: 01/05/23  4:37 PM   Result Value Ref Range    POC Glucose 358 (H) 65 - 100 mg/dL    Performed by: Sulma    POCT Glucose    Collection Time: 01/05/23  8:37 PM   Result Value Ref Range    POC Glucose 230 (H) 65 - 100 mg/dL    Performed by: Andree    POCT Glucose    Collection Time: 01/06/23  7:27 AM   Result Value Ref Range    POC Glucose 148 (H) 65 - 100 mg/dL    Performed by: Rock Foy        I have personally reviewed imaging studies showing: Other Studies:  CT HEAD WO CONTRAST   Final Result   1. No acute intracranial process evident by noncontrast CT study of the head. 2.  Moderate right mastoid effusion with fluid entering the right middle ear. This can been indicator for right otomastoiditis. Recommend clinical   correlation. This report was made using voice transcription. Despite my best efforts to avoid   any, transcription errors may persist. If there is any question about the   accuracy of the report or need for clarification, then please call [phone], or text me through [email or web address] for clarification or correction. XR CHEST PORTABLE   Final Result   1. Only mild cardiomegaly seen which does appear improved from the prior study. Some component of early or mild heart failure is difficult to exclude given the   patient's acute symptoms, however. No potential acute process is otherwise   suggested.           Current Meds:  Current Facility-Administered Medications   Medication Dose Route Frequency    predniSONE (DELTASONE) tablet 20 mg  20 mg Oral Daily    vancomycin (VANCOCIN) 1,000 mg in sodium chloride 0.9 % 250 mL IVPB ([id number])  1,000 mg IntraVENous Q12H    cefTRIAXone (ROCEPHIN) 2,000 mg in sodium chloride 0.9 % 50 mL IVPB mini-bag  2,000 mg IntraVENous Q24H    sodium chloride flush 0.9 % injection 5-40 mL  5-40 mL IntraVENous 2 times per day    sodium chloride flush 0.9 % injection 5-40 mL  5-40 mL IntraVENous PRN    0.9 % sodium chloride infusion   IntraVENous PRN    ondansetron (ZOFRAN-ODT) disintegrating tablet 4 mg  4 mg Oral Q8H PRN    Or    ondansetron (ZOFRAN) injection 4 mg  4 mg IntraVENous Q6H PRN    polyethylene glycol (GLYCOLAX) packet 17 g  17 g Oral Daily PRN    amiodarone (CORDARONE) tablet 200 mg  200 mg Oral Daily    apixaban (ELIQUIS) tablet 5 mg  5 mg Oral Q12H    carvedilol (COREG) tablet 25 mg  25 mg Oral BID WC    clopidogrel (PLAVIX) tablet 75 mg  75 mg Oral Daily    latanoprost (XALATAN) 0.005 % ophthalmic solution 1 drop  1 drop Both Eyes Nightly    magnesium oxide (MAG-OX) tablet 400 mg  400 mg Oral Daily    montelukast (SINGULAIR) tablet 10 mg  10 mg Oral Nightly    rosuvastatin (CRESTOR) tablet 10 mg  10 mg Oral Nightly    [Held by provider] valsartan (DIOVAN) tablet 80 mg  80 mg Oral Daily    glucose chewable tablet 16 g  4 tablet Oral PRN    dextrose bolus 10% 125 mL  125 mL IntraVENous PRN    Or    dextrose bolus 10% 250 mL  250 mL IntraVENous PRN    glucagon (rDNA) injection 1 mg  1 mg SubCUTAneous PRN    dextrose 10 % infusion   IntraVENous Continuous PRN    insulin lispro (HUMALOG) injection vial 0-8 Units  0-8 Units SubCUTAneous TID WC    insulin lispro (HUMALOG) injection vial 0-4 Units  0-4 Units SubCUTAneous Nightly    albuterol (PROVENTIL) nebulizer solution 2.5 mg  2.5 mg Nebulization Q6H PRN    tiotropium (SPIRIVA RESPIMAT) 2.5 MCG/ACT inhaler 2 puff  2 puff Inhalation Daily    potassium chloride (KLOR-CON M) extended release tablet 40 mEq  40 mEq Oral Once    furosemide (LASIX) tablet 40 mg  40 mg Oral Daily       Signed:  Mark Burkett MD    Part of this note may have been written by using a voice dictation software. The note has been proof read but may still contain some grammatical/other typographical errors.

## 2023-01-07 ENCOUNTER — HOSPITAL ENCOUNTER (OUTPATIENT)
Dept: INFUSION THERAPY | Age: 78
Discharge: HOME | End: 2023-01-07
Payer: MEDICARE

## 2023-01-07 PROCEDURE — 2580000003 HC RX 258: Performed by: NURSE PRACTITIONER

## 2023-01-07 PROCEDURE — 6360000002 HC RX W HCPCS: Performed by: NURSE PRACTITIONER

## 2023-01-07 PROCEDURE — 2580000003 HC RX 258: Performed by: INTERNAL MEDICINE

## 2023-01-07 PROCEDURE — A4216 STERILE WATER/SALINE, 10 ML: HCPCS | Performed by: NURSE PRACTITIONER

## 2023-01-07 PROCEDURE — 96374 THER/PROPH/DIAG INJ IV PUSH: CPT

## 2023-01-07 RX ORDER — SODIUM CHLORIDE 0.9 % (FLUSH) 0.9 %
10 SYRINGE (ML) INJECTION PRN
Status: DISCONTINUED | OUTPATIENT
Start: 2023-01-07 | End: 2023-01-08 | Stop reason: HOSPADM

## 2023-01-07 RX ADMIN — SODIUM CHLORIDE, PRESERVATIVE FREE 10 ML: 5 INJECTION INTRAVENOUS at 14:02

## 2023-01-07 RX ADMIN — DAPTOMYCIN 850 MG: 500 INJECTION, POWDER, LYOPHILIZED, FOR SOLUTION INTRAVENOUS at 14:14

## 2023-01-07 NOTE — PROGRESS NOTES
Patient arrived to [facility] for [address]. Assessment completed. No needs voiced at this time. Patient tolerated infusion well and is aware of next appointment on 1/8/2023 @1400. Patient discharged via wheelchair with spouse.

## 2023-01-08 ENCOUNTER — HOSPITAL ENCOUNTER (OUTPATIENT)
Dept: INFUSION THERAPY | Age: 78
Discharge: HOME | End: 2023-01-08
Payer: MEDICARE

## 2023-01-08 VITALS — RESPIRATION RATE: 18 BRPM | HEART RATE: 62 BPM | SYSTOLIC BLOOD PRESSURE: 123 MMHG | DIASTOLIC BLOOD PRESSURE: 61 MMHG

## 2023-01-08 PROCEDURE — 2580000003 HC RX 258: Performed by: INTERNAL MEDICINE

## 2023-01-08 PROCEDURE — 96374 THER/PROPH/DIAG INJ IV PUSH: CPT

## 2023-01-08 PROCEDURE — 6360000002 HC RX W HCPCS: Performed by: NURSE PRACTITIONER

## 2023-01-08 PROCEDURE — 2580000003 HC RX 258: Performed by: NURSE PRACTITIONER

## 2023-01-08 PROCEDURE — A4216 STERILE WATER/SALINE, 10 ML: HCPCS | Performed by: NURSE PRACTITIONER

## 2023-01-08 RX ORDER — SODIUM CHLORIDE 0.9 % (FLUSH) 0.9 %
10 SYRINGE (ML) INJECTION PRN
Status: DISCONTINUED | OUTPATIENT
Start: 2023-01-08 | End: 2023-01-09 | Stop reason: HOSPADM

## 2023-01-08 RX ADMIN — SODIUM CHLORIDE, PRESERVATIVE FREE 10 ML: 5 INJECTION INTRAVENOUS at 13:27

## 2023-01-08 RX ADMIN — DAPTOMYCIN 850 MG: 500 INJECTION, POWDER, LYOPHILIZED, FOR SOLUTION INTRAVENOUS at 13:37

## 2023-01-08 NOTE — PROGRESS NOTES
Patient arrived to [facility] for [address]. Assessment completed. No needs voiced at this time. Patient tolerated infusion well and is aware of next appointment on 1/9/2023 @1500. Patient discharged via wheelchair with spouse.

## 2023-01-09 ENCOUNTER — CARE COORDINATION (OUTPATIENT)
Dept: CARE COORDINATION | Facility: CLINIC | Age: 78
End: 2023-01-09

## 2023-01-09 ENCOUNTER — HOSPITAL ENCOUNTER (OUTPATIENT)
Dept: INFUSION THERAPY | Age: 78
Discharge: HOME | End: 2023-01-09
Payer: MEDICARE

## 2023-01-09 VITALS
TEMPERATURE: 98.2 F | HEART RATE: 62 BPM | OXYGEN SATURATION: 92 % | SYSTOLIC BLOOD PRESSURE: 121 MMHG | DIASTOLIC BLOOD PRESSURE: 63 MMHG | RESPIRATION RATE: 18 BRPM

## 2023-01-09 LAB
ALBUMIN SERPL-MCNC: 3 G/DL (ref 3.2–4.6)
ALBUMIN/GLOB SERPL: 0.8 (ref 0.4–1.6)
ALP SERPL-CCNC: 75 U/L (ref 50–136)
ALT SERPL-CCNC: 32 U/L (ref 12–65)
AST SERPL-CCNC: 10 U/L (ref 15–37)
BASOPHILS # BLD: 0 K/UL (ref 0–0.2)
BASOPHILS NFR BLD: 0 % (ref 0–2)
BILIRUB DIRECT SERPL-MCNC: 0.2 MG/DL
BILIRUB SERPL-MCNC: 0.7 MG/DL (ref 0.2–1.1)
CK SERPL-CCNC: 17 U/L (ref 21–215)
CREAT SERPL-MCNC: 1.1 MG/DL (ref 0.8–1.5)
DIFFERENTIAL METHOD BLD: ABNORMAL
EOSINOPHIL # BLD: 0 K/UL (ref 0–0.8)
EOSINOPHIL NFR BLD: 0 % (ref 0.5–7.8)
ERYTHROCYTE [DISTWIDTH] IN BLOOD BY AUTOMATED COUNT: 16.1 % (ref 11.9–14.6)
GLOBULIN SER CALC-MCNC: 3.8 G/DL (ref 2.8–4.5)
HCT VFR BLD AUTO: 30.4 %
HGB BLD-MCNC: 10 G/DL (ref 13.6–17.2)
IMM GRANULOCYTES # BLD AUTO: 0.1 K/UL (ref 0–0.5)
IMM GRANULOCYTES NFR BLD AUTO: 1 % (ref 0–5)
LYMPHOCYTES # BLD: 1.2 K/UL (ref 0.5–4.6)
LYMPHOCYTES NFR BLD: 16 % (ref 13–44)
MCH RBC QN AUTO: 30.6 PG (ref 26.1–32.9)
MCHC RBC AUTO-ENTMCNC: 32.9 G/DL (ref 31.4–35)
MCV RBC AUTO: 93 FL (ref 82–102)
MONOCYTES # BLD: 0.6 K/UL (ref 0.1–1.3)
MONOCYTES NFR BLD: 8 % (ref 4–12)
NEUTS SEG # BLD: 5.5 K/UL (ref 1.7–8.2)
NEUTS SEG NFR BLD: 75 % (ref 43–78)
NRBC # BLD: 0 K/UL (ref 0–0.2)
PLATELET # BLD AUTO: 218 K/UL (ref 150–450)
PMV BLD AUTO: 10.2 FL (ref 9.4–12.3)
PROT SERPL-MCNC: 6.8 G/DL (ref 6.3–8.2)
RBC # BLD AUTO: 3.27 M/UL (ref 4.23–5.6)
WBC # BLD AUTO: 7.3 K/UL (ref 4.3–11.1)

## 2023-01-09 PROCEDURE — 82550 ASSAY OF CK (CPK): CPT

## 2023-01-09 PROCEDURE — A4216 STERILE WATER/SALINE, 10 ML: HCPCS | Performed by: NURSE PRACTITIONER

## 2023-01-09 PROCEDURE — 96374 THER/PROPH/DIAG INJ IV PUSH: CPT

## 2023-01-09 PROCEDURE — 80076 HEPATIC FUNCTION PANEL: CPT

## 2023-01-09 PROCEDURE — 6360000002 HC RX W HCPCS: Performed by: NURSE PRACTITIONER

## 2023-01-09 PROCEDURE — 82565 ASSAY OF CREATININE: CPT

## 2023-01-09 PROCEDURE — 85025 COMPLETE CBC W/AUTO DIFF WBC: CPT

## 2023-01-09 PROCEDURE — 2580000003 HC RX 258: Performed by: NURSE PRACTITIONER

## 2023-01-09 RX ORDER — SODIUM CHLORIDE 0.9 % (FLUSH) 0.9 %
5-40 SYRINGE (ML) INJECTION 2 TIMES DAILY
Status: DISCONTINUED | OUTPATIENT
Start: 2023-01-09 | End: 2023-01-10 | Stop reason: HOSPADM

## 2023-01-09 RX ADMIN — DAPTOMYCIN 850 MG: 500 INJECTION, POWDER, LYOPHILIZED, FOR SOLUTION INTRAVENOUS at 15:20

## 2023-01-09 RX ADMIN — SODIUM CHLORIDE, PRESERVATIVE FREE 10 ML: 5 INJECTION INTRAVENOUS at 15:22

## 2023-01-09 NOTE — CARE COORDINATION
Care Transitions Outreach Attempt    Call within 2 business days of discharge: Yes   Attempted to reach patient for transitions of care follow up. Unable to reach patient. Patient: Montserrat Orantes Patient : 1945 MRN: [id number]    Last Discharge [address]       Date Complaint Diagnosis Description Type Department Provider    22 Shortness of Breath COPD exacerbation ([address] . .. ED to Hosp-Admission (Discharged) (ADMITTED) [id number] Jhony Yoon MD; Ana COTO Was this an external facility discharge?  No Discharge Facility: sfd    Noted following upcoming appointments from discharge chart review:   [facility] follow up appointment(s):   Future Appointments   Date Time Provider Mac Almaraz   2023  3:00 PM [facility]   1/10/2023  3:30 PM [facility]   2023  4:30 PM POD3B [facility]   2023  4:00 PM POD3A [facility]   2023  3:30 PM [address] [place] [address]   2023  2:00 PM [address] [place] [address]   1/15/2023  1:30 PM [address]   2023  3:00 PM POD1A [facility]   2023 11:30 AM MD KIKA WrightDE [facility] AMB   2023  2:40 PM AARON Lee - CNP [facility] AMB   3/6/2023  2:45 PM Lupe Anderson MD [facility] AMB     Non-[facility] follow up appointment(s): na

## 2023-01-09 NOTE — PROGRESS NOTES
Arrived to the [facility]. Dapto completed. Provided education on Dapto    Patient instructed to report any side affects to ordering provider. Patient tolerated without complications. Any issues or concerns during appointment: NO.  Patient aware of next infusion appointment on 1/10/23 at . Discharged via wheelchair with wife.

## 2023-01-09 NOTE — CARE COORDINATION
Care Transitions Outreach Attempt    Call within 2 business days of discharge: Yes   Attempted to reach patient for transitions of care follow up. Unable to reach patient. Patient: Lelia Jones Patient : 1945 MRN: [id number]    Last Discharge [address]       Date Complaint Diagnosis Description Type Department Provider    22 Shortness of Breath COPD exacerbation ([address] . .. ED to Hosp-Admission (Discharged) (ADMITTED) [id number] Tyshawn Leon MD; Ana COTO Was this an external facility discharge?  No Discharge Facility: sfd    Noted following upcoming appointments from discharge chart review:   [facility] follow up appointment(s):   Future Appointments   Date Time Provider Mac Almaraz   1/10/2023  3:30 PM SS [facility] [facility]   2023  4:30 PM POD3B [facility] [address] [place] [address]   2023  4:00 PM POD3A [facility] [address] [place] [address]   2023  3:30 PM BMT1 [facility] [address] [place] [address]   2023  2:00 PM BMT1 [facility]   1/15/2023  1:30 PM BMT1 [facility]   2023  3:00 PM POD1A [facility]   2023 11:30 AM Winston Mobley MD [facility] AMB   2023  2:40 PM AARON Leung - CNP [facility] AMB   3/6/2023  2:45 PM Lorenzo Pride MD [facility] AMB     Non-[facility] follow up appointment(s): na

## 2023-01-10 ENCOUNTER — CARE COORDINATION (OUTPATIENT)
Dept: CARE COORDINATION | Facility: CLINIC | Age: 78
End: 2023-01-10

## 2023-01-10 ENCOUNTER — HOSPITAL ENCOUNTER (OUTPATIENT)
Dept: INFUSION THERAPY | Age: 78
Discharge: HOME | End: 2023-01-10
Payer: MEDICARE

## 2023-01-10 VITALS
OXYGEN SATURATION: 92 % | TEMPERATURE: 98.2 F | SYSTOLIC BLOOD PRESSURE: 110 MMHG | DIASTOLIC BLOOD PRESSURE: 50 MMHG | HEART RATE: 62 BPM | RESPIRATION RATE: 18 BRPM

## 2023-01-10 PROCEDURE — 6360000002 HC RX W HCPCS: Performed by: NURSE PRACTITIONER

## 2023-01-10 PROCEDURE — 96374 THER/PROPH/DIAG INJ IV PUSH: CPT

## 2023-01-10 PROCEDURE — 2580000003 HC RX 258: Performed by: NURSE PRACTITIONER

## 2023-01-10 PROCEDURE — A4216 STERILE WATER/SALINE, 10 ML: HCPCS | Performed by: NURSE PRACTITIONER

## 2023-01-10 RX ORDER — SODIUM CHLORIDE 0.9 % (FLUSH) 0.9 %
5-40 SYRINGE (ML) INJECTION PRN
Status: DISCONTINUED | OUTPATIENT
Start: 2023-01-10 | End: 2023-01-11 | Stop reason: HOSPADM

## 2023-01-10 RX ADMIN — DAPTOMYCIN 850 MG: 500 INJECTION, POWDER, LYOPHILIZED, FOR SOLUTION INTRAVENOUS at 15:32

## 2023-01-10 RX ADMIN — SODIUM CHLORIDE, PRESERVATIVE FREE 10 ML: 5 INJECTION INTRAVENOUS at 15:30

## 2023-01-10 RX ADMIN — SODIUM CHLORIDE, PRESERVATIVE FREE 10 ML: 5 INJECTION INTRAVENOUS at 15:37

## 2023-01-10 NOTE — PROGRESS NOTES
Arrived to the [facility]. Daptomycin completed. Provided education on Daptomycin. Patient has had this medication before. Opportunity for questions provided. Patient instructed to report any side affects to ordering provider. Patient tolerated well. Any issues or concerns during appointment: no.  Patient aware of next infusion appointment on 1/11/2023 (date) at 4:30 pm (time). Discharged via wheelchair with spouse.

## 2023-01-10 NOTE — CARE COORDINATION
[facility] Care Transitions Initial Follow Up Call    Call within 2 business days of discharge: Yes    LPN Care Coordinator contacted the family by telephone to perform post hospital discharge assessment. Verified name and  with family as identifiers. Provided introduction to self, and explanation of the LPN Care Coordinator role. Patient: Breanne Ramírez Patient : 1945   MRN: [id number]  Reason for Admission: COPD exacerbation  Discharge Date: 23 RARS: Readmission Risk Score: 21.3      Last Discharge  Street       Date Complaint Diagnosis Description Type Department Provider    22 Shortness of Breath COPD exacerbation ([facility] [address] . .. ED to Hosp-Admission (Discharged) (ADMITTED) [id number] Kailee Ross MD; Ana JOHNSON. Was this an external facility discharge? No Discharge Facility: sfd    Challenges to be reviewed by the provider   Additional needs identified to be addressed with provider: No  none               Method of communication with provider: none. Mrs. Bradford Dandy states patient is doing well, attending daily infusion appointments. She states she will call Dr Sury Robledo and notify of hospitalization and schedule follow up. Patient was currently serviced by [facility], waiting on next visit, declines need for assistance with scheduling. LPN Care Coordinator reviewed discharge instructions, medical action plan, and red flags with patient who verbalized understanding. The patient was given an opportunity to ask questions and does not have any further questions or concerns at this time. Were discharge instructions available to patient? Yes. Reviewed appropriate site of care based on symptoms and resources available to patient including: PCP  Specialist  Home health  When to call 911. The patient agrees to contact the PCP office for questions related to their healthcare. Advance Care Planning:   Does patient have an Advance Directive:  decision maker verified .     Review of discharge medication was performed with family, who verbalizes understanding of administration of home medications. Medications reviewed, 1111F entered: N/A    Was patient discharged with a pulse oximeter? no    Non-face-to-face services provided:  Obtained and reviewed discharge summary and/or continuity of care documents    Offered patient enrollment in the Remote Patient Monitoring (RPM) program for in-home monitoring: NA.    Care Transitions 24 Hour Call    Do you have a copy of your discharge instructions?: Yes  Do you have all of your prescriptions and are they filled?: Yes  Have you been contacted by a [address]?: No  Have you scheduled your follow up appointment?: No  Do you have support at home?: Partner/Spouse/SO  Do you feel like you have everything you need to keep you well at home?: Yes  Are you an active caregiver in your home?: No  Care Transitions Interventions  No Identified Needs         Follow Up  Future Appointments   Date Time Provider Mac Almaraz   1/10/2023  3:30 PM SS [facility]   1/11/2023  4:30 PM POD3B [facility]   1/12/2023  4:00 PM POD3A [facility]   1/13/2023  3:30 PM BMT1 [facility]   1/14/2023  2:00 PM BMT1 [facility]   1/15/2023  1:30 PM BMT1 [facility]   1/16/2023  3:00 PM POD1A [facility]   1/26/2023 11:30 AM Kar Whipple MD [facility] AMB   2/27/2023  2:40 PM AARON Georges - CNP [facility] AMB   3/6/2023  2:45 PM Cheo Rodriguez MD [facility] AMB       LPN Care Coordinator provided contact information. Plan for follow-up call in 5-7 days based on severity of symptoms and risk factors. Plan for next call: follow-up appointment-assess for scheduling and compliance with plan of care.     Rivera Kumar LPN

## 2023-01-11 ENCOUNTER — HOSPITAL ENCOUNTER (OUTPATIENT)
Dept: INFUSION THERAPY | Age: 78
Discharge: HOME | End: 2023-01-11
Payer: MEDICARE

## 2023-01-11 VITALS
OXYGEN SATURATION: 94 % | RESPIRATION RATE: 16 BRPM | TEMPERATURE: 98.6 F | HEART RATE: 57 BPM | DIASTOLIC BLOOD PRESSURE: 67 MMHG | SYSTOLIC BLOOD PRESSURE: 138 MMHG

## 2023-01-11 DIAGNOSIS — I25.5 ISCHEMIC CARDIOMYOPATHY: ICD-10-CM

## 2023-01-11 DIAGNOSIS — I48.0 PAROXYSMAL ATRIAL FIBRILLATION (HCC): ICD-10-CM

## 2023-01-11 DIAGNOSIS — I47.20 VT (VENTRICULAR TACHYCARDIA): ICD-10-CM

## 2023-01-11 DIAGNOSIS — Z95.810 ICD (IMPLANTABLE CARDIOVERTER-DEFIBRILLATOR) IN PLACE: ICD-10-CM

## 2023-01-11 PROCEDURE — 6360000002 HC RX W HCPCS: Performed by: NURSE PRACTITIONER

## 2023-01-11 PROCEDURE — 96374 THER/PROPH/DIAG INJ IV PUSH: CPT

## 2023-01-11 PROCEDURE — 2580000003 HC RX 258: Performed by: INTERNAL MEDICINE

## 2023-01-11 PROCEDURE — 2580000003 HC RX 258: Performed by: NURSE PRACTITIONER

## 2023-01-11 PROCEDURE — A4216 STERILE WATER/SALINE, 10 ML: HCPCS | Performed by: NURSE PRACTITIONER

## 2023-01-11 RX ORDER — LANOLIN ALCOHOL/MO/W.PET/CERES
CREAM (GRAM) TOPICAL
Qty: 90 TABLET | Refills: 3 | Status: SHIPPED | OUTPATIENT
Start: 2023-01-11

## 2023-01-11 RX ORDER — MONTELUKAST SODIUM 10 MG/1
TABLET ORAL
Qty: 90 TABLET | Refills: 3 | Status: SHIPPED | OUTPATIENT
Start: 2023-01-11

## 2023-01-11 RX ORDER — SODIUM CHLORIDE 0.9 % (FLUSH) 0.9 %
5-40 SYRINGE (ML) INJECTION PRN
Status: DISCONTINUED | OUTPATIENT
Start: 2023-01-11 | End: 2023-01-12 | Stop reason: HOSPADM

## 2023-01-11 RX ADMIN — DAPTOMYCIN 850 MG: 500 INJECTION, POWDER, LYOPHILIZED, FOR SOLUTION INTRAVENOUS at 16:28

## 2023-01-11 RX ADMIN — SODIUM CHLORIDE, PRESERVATIVE FREE 10 ML: 5 INJECTION INTRAVENOUS at 16:27

## 2023-01-11 NOTE — PROGRESS NOTES
Arrived to the [facility]. Daptomycin completed. Patient instructed to report any side affects to ordering provider. Patient tolerated well. Any issues or concerns during appointment: none. Patient aware of next infusion appointment on 1/12/23 (date) at 4 PM (time). Discharged via wheelchair with spouse.

## 2023-01-11 NOTE — PROGRESS NOTES
Physician Progress Note      PATIENT:               Yamile Callahan  [facility] #:                  [id number]  :                       1945  ADMIT DATE:       2022 10:02 AM  DISCH DATE:        2023 2:33 PM  RESPONDING  PROVIDER #:        Jakub Hill MD          QUERY TEXT:    Patient admitted with COPDe and influenza. Noted documentation of metabolic   encephalopathy in the medical record. In order to further support the   diagnosis of metabolic encephalopathy please provider further evidence or   please document if the diagnosis of metabolic encephalopathy has been ruled   out after further study. The medical record reflects the following:  Risk Factors: copde, influenza, bacteremia  Clinical Indicators: As per history and physical, wife reported has been   presenting with some confusion. As per physical exam, neuro CN are intact, no   focal deficits. As per nursing documentation on , Patient awake   and in bed. Alert and oriented x4. Treatment: Antibiotics      [email or web address]  Options provided:  -- Metabolic encephalopathy  present as evidenced by, Please document   evidence. -- Metabolic encephalopathy  was ruled out  -- Other - I will add my own diagnosis  -- Disagree - Not applicable / Not valid  -- Disagree - Clinically unable to determine / Unknown  -- Refer to Clinical Documentation Reviewer    PROVIDER RESPONSE TEXT:    Metabolic encephalopathy was ruled out after study. Query created by: Margarita Kuhn on 2023 10:41 AM      QUERY TEXT:    Patient admitted with COPDe and Influenza. Noted documentation of acute   respiratory failure on H&P and progress notes. In order to support the   diagnosis of acute respiratory failure, please include additional clinical   indicators in your documentation. Or please document if the diagnosis of   acute respiratory failure has been ruled out after further study.       The medical record reflects the following:  Risk Factors: COPDe, Influenza  Clinical Indicators: Documented O2 sat of 84 percent on RA in triage. Documented arrives joking in triage with even and unlabored respirations. As   per ED MD documentation, pulmonary effort is normal.  Treatment: Supplemental oxygen 3l    Acute Respiratory Failure Clinical Indicators per [employer] Training Guide and   Quick Reference Guide:  Presence of respiratory distress, tachypnea, dyspnea, shortness of breath,   wheezing  Unable to speak in complete sentences  Use of accessory muscles to breathe  Extreme anxiety and feeling of impending doom  Tripod position  Confusion/altered mental status/obtunded  Options provided:  -- Acute Respiratory Failure as evidenced by, Please document evidence. -- Acute Respiratory Failure ruled out after study  -- Other - I will add my own diagnosis  -- Disagree - Not applicable / Not valid  -- Disagree - Clinically unable to determine / Unknown  -- Refer to Clinical Documentation Reviewer    PROVIDER RESPONSE TEXT:    This patient is in acute respiratory failure as evidenced by RA saturation 88%   in ER documentation    Query created by: Renae Chiang on 1/11/2023 10:48 AM      Electronically signed by:   Janeth Luke MD 1/11/2023 4:21 PM

## 2023-01-12 ENCOUNTER — HOSPITAL ENCOUNTER (OUTPATIENT)
Dept: INFUSION THERAPY | Age: 78
Discharge: HOME | End: 2023-01-12
Payer: MEDICARE

## 2023-01-12 ENCOUNTER — TELEPHONE (OUTPATIENT)
Dept: INTERNAL MEDICINE CLINIC | Facility: CLINIC | Age: 78
End: 2023-01-12

## 2023-01-12 ENCOUNTER — OFFICE VISIT (OUTPATIENT)
Dept: INTERNAL MEDICINE CLINIC | Facility: CLINIC | Age: 78
End: 2023-01-12

## 2023-01-12 VITALS
TEMPERATURE: 97.6 F | WEIGHT: 222.8 LBS | DIASTOLIC BLOOD PRESSURE: 76 MMHG | BODY MASS INDEX: 29.39 KG/M2 | RESPIRATION RATE: 18 BRPM | HEART RATE: 68 BPM | SYSTOLIC BLOOD PRESSURE: 167 MMHG | OXYGEN SATURATION: 92 %

## 2023-01-12 VITALS
HEART RATE: 61 BPM | TEMPERATURE: 97.9 F | DIASTOLIC BLOOD PRESSURE: 58 MMHG | RESPIRATION RATE: 17 BRPM | OXYGEN SATURATION: 96 % | BODY MASS INDEX: 30.75 KG/M2 | WEIGHT: 232 LBS | HEIGHT: 73 IN | SYSTOLIC BLOOD PRESSURE: 139 MMHG

## 2023-01-12 DIAGNOSIS — Z09 HOSPITAL DISCHARGE FOLLOW-UP: Primary | ICD-10-CM

## 2023-01-12 DIAGNOSIS — J44.1 COPD EXACERBATION (HCC): ICD-10-CM

## 2023-01-12 DIAGNOSIS — R78.81 BACTEREMIA: ICD-10-CM

## 2023-01-12 DIAGNOSIS — I50.22 CHRONIC SYSTOLIC CONGESTIVE HEART FAILURE (HCC): ICD-10-CM

## 2023-01-12 LAB
ALBUMIN SERPL-MCNC: 3.1 G/DL (ref 3.2–4.6)
ALBUMIN/GLOB SERPL: 0.9 (ref 0.4–1.6)
ALP SERPL-CCNC: 78 U/L (ref 50–136)
ALT SERPL-CCNC: 26 U/L (ref 12–65)
ANION GAP SERPL CALC-SCNC: 7 MMOL/L (ref 2–11)
AST SERPL-CCNC: 10 U/L (ref 15–37)
BASOPHILS # BLD: 0 K/UL (ref 0–0.2)
BASOPHILS NFR BLD: 0 % (ref 0–2)
BILIRUB SERPL-MCNC: 0.7 MG/DL (ref 0.2–1.1)
BUN SERPL-MCNC: 9 MG/DL (ref 8–23)
CALCIUM SERPL-MCNC: 8.9 MG/DL (ref 8.3–10.4)
CHLORIDE SERPL-SCNC: 106 MMOL/L (ref 101–110)
CO2 SERPL-SCNC: 28 MMOL/L (ref 21–32)
CREAT SERPL-MCNC: 0.9 MG/DL (ref 0.8–1.5)
DIFFERENTIAL METHOD BLD: ABNORMAL
EOSINOPHIL # BLD: 0 K/UL (ref 0–0.8)
EOSINOPHIL NFR BLD: 0 % (ref 0.5–7.8)
ERYTHROCYTE [DISTWIDTH] IN BLOOD BY AUTOMATED COUNT: 16.4 % (ref 11.9–14.6)
GLOBULIN SER CALC-MCNC: 3.4 G/DL (ref 2.8–4.5)
GLUCOSE SERPL-MCNC: 118 MG/DL (ref 65–100)
HCT VFR BLD AUTO: 30 % (ref 41.1–50.3)
HGB BLD-MCNC: 9.6 G/DL (ref 13.6–17.2)
IMM GRANULOCYTES # BLD AUTO: 0.1 K/UL (ref 0–0.5)
IMM GRANULOCYTES NFR BLD AUTO: 1 % (ref 0–5)
LYMPHOCYTES # BLD: 1.2 K/UL (ref 0.5–4.6)
LYMPHOCYTES NFR BLD: 17 % (ref 13–44)
MCH RBC QN AUTO: 30.8 PG (ref 26.1–32.9)
MCHC RBC AUTO-ENTMCNC: 32 G/DL (ref 31.4–35)
MCV RBC AUTO: 96.2 FL (ref 82–102)
MONOCYTES # BLD: 0.5 K/UL (ref 0.1–1.3)
MONOCYTES NFR BLD: 7 % (ref 4–12)
NEUTS SEG # BLD: 5.1 K/UL (ref 1.7–8.2)
NEUTS SEG NFR BLD: 75 % (ref 43–78)
NRBC # BLD: 0 K/UL (ref 0–0.2)
PLATELET # BLD AUTO: 230 K/UL (ref 150–450)
PMV BLD AUTO: 10.3 FL (ref 9.4–12.3)
POTASSIUM SERPL-SCNC: 4 MMOL/L (ref 3.5–5.1)
PROT SERPL-MCNC: 6.5 G/DL (ref 6.3–8.2)
RBC # BLD AUTO: 3.12 M/UL (ref 4.23–5.6)
SODIUM SERPL-SCNC: 141 MMOL/L (ref 133–143)
WBC # BLD AUTO: 6.9 K/UL (ref 4.3–11.1)

## 2023-01-12 PROCEDURE — 2580000003 HC RX 258: Performed by: NURSE PRACTITIONER

## 2023-01-12 PROCEDURE — 2580000003 HC RX 258: Performed by: INTERNAL MEDICINE

## 2023-01-12 PROCEDURE — 6360000002 HC RX W HCPCS: Performed by: NURSE PRACTITIONER

## 2023-01-12 PROCEDURE — 96374 THER/PROPH/DIAG INJ IV PUSH: CPT

## 2023-01-12 PROCEDURE — A4216 STERILE WATER/SALINE, 10 ML: HCPCS | Performed by: NURSE PRACTITIONER

## 2023-01-12 RX ORDER — SODIUM CHLORIDE 0.9 % (FLUSH) 0.9 %
5-40 SYRINGE (ML) INJECTION PRN
Status: DISCONTINUED | OUTPATIENT
Start: 2023-01-12 | End: 2023-01-13 | Stop reason: HOSPADM

## 2023-01-12 RX ADMIN — SODIUM CHLORIDE, PRESERVATIVE FREE 10 ML: 5 INJECTION INTRAVENOUS at 11:55

## 2023-01-12 RX ADMIN — DAPTOMYCIN 850 MG: 500 INJECTION, POWDER, LYOPHILIZED, FOR SOLUTION INTRAVENOUS at 12:26

## 2023-01-12 ASSESSMENT — ENCOUNTER SYMPTOMS
NAUSEA: 0
VOMITING: 0
SHORTNESS OF BREATH: 0

## 2023-01-12 NOTE — PROGRESS NOTES
Arrived to the [facility]. Daptomycin infusion completed. Patient tolerated well. IV remains in place. Any issues or concerns during appointment: no.  Patient aware of next infusion appointment on 1/13/2023 (date) at  (time). Patient instructed to call provider with temperature of 100.4 or greater or nausea/vomiting/ diarrhea or pain not controlled by medications  Discharged via wheelchair w/family.

## 2023-01-12 NOTE — TELEPHONE ENCOUNTER
Geo Jeanlexii Kenton-  The labs that we did show stable anemia, normal liver and kidney functions. Let us know if you develop any dark stools, new or worsening symptoms. Hoping you are well!   Janay

## 2023-01-12 NOTE — PROGRESS NOTES
1/12/2023 8:04 AM  Location:[facility] [address] INTERNAL MEDICINE  [place]  Patient #:  [id number]  YOB: 1945          YOUR LAST HEMOGLOBIN A1CS:   No results found for: HBA1C, [id number]    YOUR LAST LIPID PROFILE:   Lab Results   Component Value Date/Time    CHOL 77 03/05/2022 06:14 AM    HDL 45 03/05/2022 06:14 AM    VLDL 18 09/21/2021 02:54 PM         Lab Results   Component Value Date/Time    GFRAA >60 09/16/2022 04:42 AM    BUN 18 01/05/2023 04:33 AM     01/05/2023 04:33 AM    K 3.8 01/05/2023 04:33 AM     01/05/2023 04:33 AM    CO2 34 01/05/2023 04:33 AM           History of Present Illness       Mr. Shabana Palacios is a 65 y.o. male  who presents for TCM follow up. Mr. Fozia Goins presents for TCM follow-up. He was admitted to the hospital from December 29 to January 6. He was admitted with hypoxic respiratory failure due to flu with COPD exacerbation. His history is significant for ischemic cardiomyopathy, systolic heart failure, SHERI, PAD, COPD, hypertension, type 2 diabetes, hyperlipidemia and atrial fibrillation. Blood cultures grew alpha strep and coNS for which ID was consulted. He is receiving outpatient infusions of daptomycin. Consults this admission:  IP WOUND CARE NURSE CONSULT TO EVAL  IP CONSULT TO PHARMACY  IP CONSULT TO INFECTIOUS DISEASES  IP CONSULT TO PHARMACY  IP CONSULT TO CARDIOLOGY  IP CONSULT TO CASE MANAGEMENT     Echocardiogram results:  12/29/22     TRANSESOPHAGEAL ECHOCARDIOGRAM (CONTRAST/3D PRN) 01/05/2023  4:33 PM (Final)     Interpretation Summary  ·  Left Ventricle: Normal left ventricular systolic function with a visually estimated EF of 55 - 60%. Left ventricle size is normal. Mildly increased wall thickness. Findings consistent with mild concentric hypertrophy. Normal wall motion. Normal diastolic function for age. ·  Mitral Valve: Mild regurgitation with a centrally directed jet. ·  Left Atrium: Left atrium is moderately dilated. -Atrial paced rhythm noted during the study. No valvular vegetations noted. Signed by: Kavin Valdes MD on 1/5/2023  4:33 PM        Diagnostic Imaging/Tests:   CT HEAD WO CONTRAST     Result Date: 12/29/2022  1. No acute intracranial process evident by noncontrast CT study of the head. 2.  Moderate right mastoid effusion with fluid entering the right middle ear. This can been indicator for right otomastoiditis. Recommend clinical correlation. This report was made using voice transcription. Despite my best efforts to avoid any, transcription errors may persist. If there is any question about the accuracy of the report or need for clarification, then please call [phone], or text me through perfectserv for clarification or correction. XR CHEST PORTABLE     Result Date: 12/29/2022  1. Only mild cardiomegaly seen which does appear improved from the prior study. Some component of early or mild heart failure is difficult to exclude given the patient's acute symptoms, however. No potential acute process is otherwise suggested. Today he is with his wife, reports feeling well, has an upcoming appointment with the wound center for his chronic heel ulcer and is staying busy getting daily IV antibiotics. He is eating and drinking well and reports no fevers or chills, nausea or vomiting. They have not called home health and physical therapy to come out but feels that they have been beneficial in the past.  They are established with them and would like to get back into restarting physical therapy.          Allergies   Allergen Reactions    Acetaminophen Hives     Per spouse has small amount of hives, takes 1/2 and tolerates     Azithromycin Hives    Morphine Hallucinations     Muscle twitching. jerking    Oxaprozin Other (See Comments)     ELEVATED BLOOD PRESSURE    Oxycodone Anxiety     Past Medical History:   Diagnosis Date    Acute respiratory failure with hypoxia ([facility] [address] 10/23/2014    MINI (acute kidney injury) ([address] 09/15/2014    MINI (acute kidney injury) ([address]     MINI (acute kidney injury) ([address]     Allergic rhinitis 11/10/2015    Asthma 11/10/2015    Benign essential hypertension 11/10/2015    Benign neoplasm of colon 11/10/2015    CAD (coronary artery disease) 11/10/2015    CAD (coronary artery disease) 1998, 1999    mix2, 3 stents [id number]    CAP (community acquired pneumonia) 12/31/2020    Cardiomyopathy ([address] 33/34/9122    Complicated wound infection 11/10/2015    COPD (chronic obstructive pulmonary disease) with emphysema ([address] 11/10/2015    COPD exacerbation ([address] 10/12/2011    COVID-19 12/31/2020    Diabetes mellitus type 2, controlled ([address] 11/10/2015    doesn/t check daily oral meds, A1c 6.0 (8/10/22)    Diabetic neuropathy ([address] 11/10/2015    Disruption of wound, unspecified 10/09/2014    Encounter for long-term (current) use of other high-risk medications 11/10/2015    Extremity atherosclerosis with intermittent claudication ([address] 11/10/2015    H/O endarterectomy 11/10/2015    Hiatal hernia 11/10/2015    History of 2019 novel coronavirus disease (COVID-19)     12/31/2020- hospitalized    Hyperglycemia due to type 1 diabetes mellitus ([address] 11/10/2015    Hyperlipidemia 11/10/2015    ICD (implantable cardioverter-defibrillator) in place 06/16/2022    Monomorphic ventricular tachycardia 12/31/2020    Myocardial infarction ([address] 1999    Myocardial infarction ([address] 11/10/2015    Obesity 11/10/2015    Orthostatic hypotension 11/10/2015    Osteoarthritis 11/10/2015    Peripheral vascular disease ([address] 11/10/2015    PNA (pneumonia) 02/08/2019    PVC (premature ventricular contraction)     Rash 85/00/2066    Seroma complicating a procedure 10/02/2014    Sleep apnea     cpap    Toe laceration     Type 2 diabetes with nephropathy ([address]     Uncontrolled type 2 diabetes mellitus 11/10/2015    Vertiginous syndromes and other disorders of vestibular system 11/10/2015     Social History     Socioeconomic History Marital status:    Tobacco Use    Smoking status: Former     Packs/day: 1.00     Years: 50.00     Pack years: 50.00     Types: Cigarettes     Quit date: 10/2/2011     Years since quittin.2    Smokeless tobacco: Current     Types: Chew    Tobacco comments:     Chews tobacco daily   Vaping Use    Vaping Use: Never used   Substance and Sexual Activity    Alcohol use: Yes     Alcohol/week: 0.0 standard drinks    Drug use: No   Social History Narrative    Lives with wife     Social Determinants of Health     Financial Resource Strain: Unknown    Difficulty of Paying Living Expenses: Patient refused   Food Insecurity: Unknown    Worried About Running Out of Food in the Last Year: Patient refused    Ran Out of Food in the Last Year: Patient refused     Past Surgical History:   Procedure Laterality Date    CARDIAC CATHETERIZATION  2018    LV EF=40%. LM:nl. LAD:30-40% mid stenosis. LCX OM1:95% stenosis. RCA:100% occlusion at RV branch.     CATARACT REMOVAL Right 2022    CORONARY ANGIOPLASTY WITH STENT PLACEMENT  2018    LCX OM1:2.5 x 12 [place] RAKESH    CORONARY ANGIOPLASTY WITH STENT PLACEMENT      [place] UNLISTED PROCEDURE ABDOMEN PERITONEUM & OMENTUM  1960    abdominal cyst removed     [address]    stents x3    SPINE SURGERY N/A 2022    LUMBAR 2, LUMBAR 4 KYPHOPLASTY AND ANY OTHER INDICATED LEVELS performed by Minh Falcon III, MD at [address] [place] [address]  14    Repair of right common femoral artery     VASCULAR SURGERY  14    tiffanie femoral endarterectomy    VASCULAR SURGERY Left 10/28/2022    LEFT LOWER EXTREMITY ARTERIOGRAM / ULTRASOUND GUIDED ACCESS   performed by Rosetta Oviedo MD at [facility] MAIN OR     Current Outpatient Medications   Medication Sig Dispense Refill    montelukast (SINGULAIR) 10 MG tablet TAKE 1 TABLET BY MOUTH EVERY DAY AT BEDTIME 90 tablet 3    magnesium oxide (MAG-OX) 400 (240 Mg) MG tablet TAKE 1 TABLET BY MOUTH EVERY DAY 90 tablet 3    albuterol (PROVENTIL) (2.5 MG/3ML) 0.083% nebulizer solution 1 vial via nebulizer 4 times daily if needed for shortness of breath or wheezing.  Diagnosis-COPD, J44.9.  Bill to Medicare part B 360 mL 11    carvedilol (COREG) 25 MG tablet Take 1 tablet by mouth 2 times daily (with meals) 60 tablet 11    senna-docusate (PERICOLACE) 8.6-50 MG per tablet Take 1 tablet by mouth nightly      valsartan (DIOVAN) 80 MG tablet Take 1 tablet by mouth daily 30 tablet 5    CPAP Machine MISC by Does not apply route      albuterol sulfate HFA (PROVENTIL;VENTOLIN;PROAIR) 108 (90 Base) MCG/ACT inhaler USE 2 PUFFS BY INHALATION 4 TIMES DAILY AS NEEDED FOR WHEEZING OR SHORTNESS OF BREATH. Patient prefers ventolin. 18 g 11    GUAIFENESIN 1200 PO Take 1 tablet by mouth every 12 hours as needed      tamsulosin (FLOMAX) 0.4 MG capsule Take 0.4 mg by mouth daily      traMADol (ULTRAM) 50 MG tablet Take 50 mg by mouth every 6 hours as needed for Pain. Taking 1/2 every morning      acetaminophen (TYLENOL) 500 MG tablet Take 500 mg by mouth every 6 hours as needed for Pain Taking 1/2 two times daily      glipiZIDE (GLUCOTROL XL) 10 MG extended release tablet Take 1 tablet by mouth daily 30 tablet 0    furosemide (LASIX) 20 MG tablet Take 1 tablet by mouth in the morning. (Patient taking differently: Take 20 mg by mouth daily 1/2 tablet) 60 tablet 3    rosuvastatin (CRESTOR) 10 MG tablet TAKE 1 TABLET BY MOUTH EVERY DAY (Patient taking differently: at bedtime) 90 tablet 0    fluticasone-salmeterol (ADVAIR) 250-50 MCG/ACT AEPB diskus inhaler Inhale 1 puff into the lungs in the morning and at bedtime      amiodarone (CORDARONE) 200 MG tablet Take 200 mg by mouth daily      apixaban (ELIQUIS) 5 MG TABS tablet Take 5 mg by mouth every 12 hours      ascorbic acid (VITAMIN C) 500 MG tablet Take 500 mg by mouth      Cholecalciferol 50 MCG (2000 UT) TABS Take 2,000 Units by mouth      clopidogrel (PLAVIX) 75 MG tablet  Take 75 mg by mouth daily      fluticasone (FLONASE) 50 MCG/ACT nasal spray USE 1 OR 2 SPRAYS IN EACH NOSTRIL EVERY DAY. latanoprost (XALATAN) 0.005 % ophthalmic solution Apply 1 drop to eye nightly      nitroGLYCERIN (NITROSTAT) 0.4 MG SL tablet Place 0.4 mg under the tongue       No current facility-administered medications for this visit. Health Maintenance   Topic Date Due    DTaP/Tdap/Td vaccine (1 - Tdap) Never done    Low dose CT lung screening  Never done    Pneumococcal 65+ years Vaccine (3 - PPSV23 if available, else PCV20) 10/17/2016    COVID-19 Vaccine (3 - Booster for Moderna series) 06/10/2021    Diabetic retinal exam  05/18/2022    Flu vaccine (1) 08/01/2022    Diabetic foot exam  09/21/2022    Lipids  03/05/2023    Annual Wellness Visit (AWV)  03/24/2023    Prostate Specific Antigen (PSA) Screening or Monitoring  05/18/2023    A1C test (Diabetic or Prediabetic)  06/29/2023    Depression Screen  11/14/2023    Shingles vaccine  Completed    Hepatitis C screen  Completed    Hepatitis A vaccine  Aged Out    Hib vaccine  Aged Out    Meningococcal (ACWY) vaccine  Aged Out     Family History   Problem Relation Age of Onset    Breast Cancer Mother     Hypertension Mother     Other Father         DIVERTICULITIS    Crohn's Disease Sister     Lung Disease Brother         mesothioloma    Diabetes Sister     Heart Attack Father     Heart Disease Father     Stroke Mother              Review of Systems  Review of Systems   Constitutional:  Negative for appetite change, chills and fever. Respiratory:  Negative for shortness of breath. Cardiovascular:  Negative for chest pain, palpitations and leg swelling. Gastrointestinal:  Negative for nausea and vomiting. Skin:  Positive for wound (chronic heel wound). Blood pressure 139/58  Weight 232 pounds  Heart rate 61  Temperature 97.9  Respiration 17  SPO2 96%      Physical Exam    Physical Exam  Vitals and nursing note reviewed.    Constitutional: General: He is not in acute distress. Appearance: He is not ill-appearing (looks well, sitting in wheelchair), toxic-appearing or diaphoretic. Neck:      Vascular: No carotid bruit. Cardiovascular:      Rate and Rhythm: Regular rhythm. Heart sounds: No murmur heard. Pulmonary:      Effort: No respiratory distress. Breath sounds: No stridor. Rales (fine bibasilar) present. No wheezing. Musculoskeletal:      Right lower leg: No edema. Left lower leg: No edema. Skin:     Coloration: Skin is pale. Neurological:      Mental Status: He is oriented to person, place, and time. Assessment & Plan         Current Outpatient Medications   Medication Sig Dispense Refill    montelukast (SINGULAIR) 10 MG tablet TAKE 1 TABLET BY MOUTH EVERY DAY AT BEDTIME 90 tablet 3    magnesium oxide (MAG-OX) 400 (240 Mg) MG tablet TAKE 1 TABLET BY MOUTH EVERY DAY 90 tablet 3    albuterol (PROVENTIL) (2.5 MG/3ML) 0.083% nebulizer solution 1 vial via nebulizer 4 times daily if needed for shortness of breath or wheezing. Diagnosis-COPD, J44.9. Bill to Medicare part B 360 mL 11    carvedilol (COREG) 25 MG tablet Take 1 tablet by mouth 2 times daily (with meals) 60 tablet 11    senna-docusate (PERICOLACE) 8.6-50 MG per tablet Take 1 tablet by mouth nightly      valsartan (DIOVAN) 80 MG tablet Take 1 tablet by mouth daily 30 tablet 5    CPAP Machine MISC by Does not apply route      albuterol sulfate HFA (PROVENTIL;VENTOLIN;PROAIR) 108 (90 Base) MCG/ACT inhaler USE 2 PUFFS BY INHALATION 4 TIMES DAILY AS NEEDED FOR WHEEZING OR SHORTNESS OF BREATH. Patient prefers ventolin. 18 g 11    GUAIFENESIN 1200 PO Take 1 tablet by mouth every 12 hours as needed      tamsulosin (FLOMAX) 0.4 MG capsule Take 0.4 mg by mouth daily      traMADol (ULTRAM) 50 MG tablet Take 50 mg by mouth every 6 hours as needed for Pain.  Taking 1/2 every morning      acetaminophen (TYLENOL) 500 MG tablet Take 500 mg by mouth every 6 hours as needed for Pain Taking 1/2 two times daily      glipiZIDE (GLUCOTROL XL) 10 MG extended release tablet Take 1 tablet by mouth daily 30 tablet 0    furosemide (LASIX) 20 MG tablet Take 1 tablet by mouth in the morning. (Patient taking differently: Take 20 mg by mouth daily 1/2 tablet) 60 tablet 3    rosuvastatin (CRESTOR) 10 MG tablet TAKE 1 TABLET BY MOUTH EVERY DAY (Patient taking differently: at bedtime) 90 tablet 0    fluticasone-salmeterol (ADVAIR) 250-50 MCG/ACT AEPB diskus inhaler Inhale 1 puff into the lungs in the morning and at bedtime      amiodarone (CORDARONE) 200 MG tablet Take 200 mg by mouth daily      apixaban (ELIQUIS) 5 MG TABS tablet Take 5 mg by mouth every 12 hours      ascorbic acid (VITAMIN C) 500 MG tablet Take 500 mg by mouth      Cholecalciferol 50 MCG (2000 UT) TABS Take 2,000 Units by mouth      clopidogrel (PLAVIX) 75 MG tablet Take 75 mg by mouth daily      fluticasone (FLONASE) 50 MCG/ACT nasal spray USE 1 OR 2 SPRAYS IN EACH NOSTRIL EVERY DAY. latanoprost (XALATAN) 0.005 % ophthalmic solution Apply 1 drop to eye nightly      nitroGLYCERIN (NITROSTAT) 0.4 MG SL tablet Place 0.4 mg under the tongue       No current facility-administered medications for this visit. 1. Hospital discharge follow-up  Hospital records reviewed, on daily outpatient daptomycin, has upcoming follow-up with his wound care specialist as well as cardiology. Appears well and nontoxic today, is holding his Crestor we will add daptomycin as outlined in the discharge summary. We will recheck labs and follow-up by phone. Does have upcoming routine follow-up with Dr. Sury Robledo as well. Knows to call the office for any new or worsening symptoms.  - Comprehensive Metabolic Panel; Future  - Comprehensive Metabolic Panel    2. Bacteremia  On daptomycin, doing well without complaints. - CBC with Auto Differential; Future  - CBC with Auto Differential    3.  COPD exacerbation ([facility] [address]  Not hypoxic, on no supplemental oxygen therapy. 4. Chronic systolic congestive heart failure ([facility] [address]  Followed by cardiology, does have bibasilar rales but appears euvolemic and otherwise well. He does take a daily low-dose of Lasix, we discussed weighing daily and if his weight is up by more than 3 pounds in 24 hours or he has any leg swelling will increase his Lasix dose for several days.         Jennifer Nance NP, APRN - CNP

## 2023-01-13 ENCOUNTER — HOSPITAL ENCOUNTER (OUTPATIENT)
Dept: INFUSION THERAPY | Age: 78
Discharge: HOME | End: 2023-01-13
Payer: MEDICARE

## 2023-01-13 VITALS
HEART RATE: 63 BPM | DIASTOLIC BLOOD PRESSURE: 54 MMHG | SYSTOLIC BLOOD PRESSURE: 97 MMHG | TEMPERATURE: 98 F | OXYGEN SATURATION: 92 % | RESPIRATION RATE: 16 BRPM

## 2023-01-13 PROCEDURE — 6360000002 HC RX W HCPCS: Performed by: NURSE PRACTITIONER

## 2023-01-13 PROCEDURE — A4216 STERILE WATER/SALINE, 10 ML: HCPCS | Performed by: NURSE PRACTITIONER

## 2023-01-13 PROCEDURE — 96374 THER/PROPH/DIAG INJ IV PUSH: CPT

## 2023-01-13 PROCEDURE — 2580000003 HC RX 258: Performed by: NURSE PRACTITIONER

## 2023-01-13 RX ORDER — SODIUM CHLORIDE 0.9 % (FLUSH) 0.9 %
5-40 SYRINGE (ML) INJECTION 2 TIMES DAILY
Status: DISCONTINUED | OUTPATIENT
Start: 2023-01-13 | End: 2023-01-14 | Stop reason: HOSPADM

## 2023-01-13 RX ADMIN — SODIUM CHLORIDE, PRESERVATIVE FREE 10 ML: 5 INJECTION INTRAVENOUS at 12:11

## 2023-01-13 RX ADMIN — DAPTOMYCIN 850 MG: 500 INJECTION, POWDER, LYOPHILIZED, FOR SOLUTION INTRAVENOUS at 12:25

## 2023-01-13 NOTE — PROGRESS NOTES
Arrived to the [facility]. Daptomycin completed. Patient tolerated well. Any issues or concerns during appointment: no.  Patient aware of next infusion appointment on 1/14/23 (date) at 1400 (time). Patient instructed to call provider with temperature of 100.4 or greater or nausea/vomiting/ diarrhea or pain not controlled by medications  Discharged via wheelchair with family.

## 2023-01-14 ENCOUNTER — HOSPITAL ENCOUNTER (OUTPATIENT)
Dept: INFUSION THERAPY | Age: 78
Discharge: HOME | End: 2023-01-14
Payer: MEDICARE

## 2023-01-14 VITALS
TEMPERATURE: 97.8 F | SYSTOLIC BLOOD PRESSURE: 117 MMHG | DIASTOLIC BLOOD PRESSURE: 53 MMHG | HEART RATE: 61 BPM | RESPIRATION RATE: 16 BRPM | OXYGEN SATURATION: 93 %

## 2023-01-14 PROCEDURE — 96374 THER/PROPH/DIAG INJ IV PUSH: CPT

## 2023-01-14 PROCEDURE — A4216 STERILE WATER/SALINE, 10 ML: HCPCS | Performed by: NURSE PRACTITIONER

## 2023-01-14 PROCEDURE — 6360000002 HC RX W HCPCS: Performed by: NURSE PRACTITIONER

## 2023-01-14 PROCEDURE — 2580000003 HC RX 258: Performed by: NURSE PRACTITIONER

## 2023-01-14 RX ADMIN — DAPTOMYCIN 850 MG: 500 INJECTION, POWDER, LYOPHILIZED, FOR SOLUTION INTRAVENOUS at 14:14

## 2023-01-14 NOTE — PROGRESS NOTES
Arrived to the [facility]. Daptomycin completed, PIV remains in place per order. Patient tolerated well. Any issues or concerns during appointment: No.  Patient aware of next infusion appointment on 1/15/23 @1330. [place]  Patient instructed to call provider with temperature of 100.4 or greater or nausea/vomiting/ diarrhea or pain not controlled by medications  Discharged via wheelchair with family.

## 2023-01-15 ENCOUNTER — HOSPITAL ENCOUNTER (OUTPATIENT)
Dept: INFUSION THERAPY | Age: 78
Discharge: HOME | End: 2023-01-15
Payer: MEDICARE

## 2023-01-15 VITALS
TEMPERATURE: 97.7 F | SYSTOLIC BLOOD PRESSURE: 108 MMHG | DIASTOLIC BLOOD PRESSURE: 58 MMHG | HEART RATE: 60 BPM | RESPIRATION RATE: 16 BRPM

## 2023-01-15 PROCEDURE — 6360000002 HC RX W HCPCS: Performed by: NURSE PRACTITIONER

## 2023-01-15 PROCEDURE — 2580000003 HC RX 258: Performed by: NURSE PRACTITIONER

## 2023-01-15 PROCEDURE — A4216 STERILE WATER/SALINE, 10 ML: HCPCS | Performed by: NURSE PRACTITIONER

## 2023-01-15 PROCEDURE — 96374 THER/PROPH/DIAG INJ IV PUSH: CPT

## 2023-01-15 RX ORDER — SODIUM CHLORIDE 0.9 % (FLUSH) 0.9 %
5-40 SYRINGE (ML) INJECTION 2 TIMES DAILY
Status: DISCONTINUED | OUTPATIENT
Start: 2023-01-15 | End: 2023-01-16 | Stop reason: HOSPADM

## 2023-01-15 RX ADMIN — DAPTOMYCIN 850 MG: 500 INJECTION, POWDER, LYOPHILIZED, FOR SOLUTION INTRAVENOUS at 13:43

## 2023-01-15 RX ADMIN — SODIUM CHLORIDE, PRESERVATIVE FREE 10 ML: 5 INJECTION INTRAVENOUS at 13:49

## 2023-01-15 NOTE — PROGRESS NOTES
Arrived to the [facility]. daptomycin completed. Patient tolerated well. Any issues or concerns during appointment: no.  Patient aware of next infusion appointment on 1/16 at 1500  Patient instructed to call provider with temperature of 100.4 or greater or nausea/vomiting/ diarrhea or pain not controlled by medications  Discharged to home via [facility]. Child

## 2023-01-16 ENCOUNTER — TELEPHONE (OUTPATIENT)
Dept: PULMONOLOGY | Age: 78
End: 2023-01-16

## 2023-01-16 ENCOUNTER — HOSPITAL ENCOUNTER (OUTPATIENT)
Dept: INFUSION THERAPY | Age: 78
Discharge: HOME | End: 2023-01-16
Payer: MEDICARE

## 2023-01-16 VITALS
OXYGEN SATURATION: 94 % | TEMPERATURE: 98.3 F | HEART RATE: 58 BPM | SYSTOLIC BLOOD PRESSURE: 109 MMHG | DIASTOLIC BLOOD PRESSURE: 66 MMHG | RESPIRATION RATE: 16 BRPM

## 2023-01-16 LAB
ALBUMIN SERPL-MCNC: 3.2 G/DL (ref 3.2–4.6)
ALBUMIN/GLOB SERPL: 0.9 (ref 0.4–1.6)
ALP SERPL-CCNC: 84 U/L (ref 50–136)
ALT SERPL-CCNC: 25 U/L (ref 12–65)
AST SERPL-CCNC: 13 U/L (ref 15–37)
BASOPHILS # BLD: 0 K/UL (ref 0–0.2)
BASOPHILS NFR BLD: 0 % (ref 0–2)
BILIRUB DIRECT SERPL-MCNC: <0.1 MG/DL
BILIRUB SERPL-MCNC: 0.5 MG/DL (ref 0.2–1.1)
CK SERPL-CCNC: 25 U/L (ref 21–215)
CREAT SERPL-MCNC: 1.2 MG/DL (ref 0.8–1.5)
DIFFERENTIAL METHOD BLD: ABNORMAL
EOSINOPHIL # BLD: 0 K/UL (ref 0–0.8)
EOSINOPHIL NFR BLD: 0 % (ref 0.5–7.8)
ERYTHROCYTE [DISTWIDTH] IN BLOOD BY AUTOMATED COUNT: 17.1 % (ref 11.9–14.6)
GLOBULIN SER CALC-MCNC: 3.6 G/DL (ref 2.8–4.5)
HCT VFR BLD AUTO: 30.9 %
HGB BLD-MCNC: 10 G/DL (ref 13.6–17.2)
IMM GRANULOCYTES # BLD AUTO: 0.1 K/UL (ref 0–0.5)
IMM GRANULOCYTES NFR BLD AUTO: 1 % (ref 0–5)
LYMPHOCYTES # BLD: 1.4 K/UL (ref 0.5–4.6)
LYMPHOCYTES NFR BLD: 24 % (ref 13–44)
MCH RBC QN AUTO: 30.7 PG (ref 26.1–32.9)
MCHC RBC AUTO-ENTMCNC: 32.4 G/DL (ref 31.4–35)
MCV RBC AUTO: 94.8 FL (ref 82–102)
MONOCYTES # BLD: 0.4 K/UL (ref 0.1–1.3)
MONOCYTES NFR BLD: 7 % (ref 4–12)
NEUTS SEG # BLD: 3.9 K/UL (ref 1.7–8.2)
NEUTS SEG NFR BLD: 68 % (ref 43–78)
NRBC # BLD: 0 K/UL (ref 0–0.2)
PLATELET # BLD AUTO: 195 K/UL (ref 150–450)
PMV BLD AUTO: 10.4 FL (ref 9.4–12.3)
PROT SERPL-MCNC: 6.8 G/DL (ref 6.3–8.2)
RBC # BLD AUTO: 3.26 M/UL (ref 4.23–5.6)
WBC # BLD AUTO: 5.7 K/UL (ref 4.3–11.1)

## 2023-01-16 PROCEDURE — 82565 ASSAY OF CREATININE: CPT

## 2023-01-16 PROCEDURE — 80076 HEPATIC FUNCTION PANEL: CPT

## 2023-01-16 PROCEDURE — 6360000002 HC RX W HCPCS: Performed by: NURSE PRACTITIONER

## 2023-01-16 PROCEDURE — 85025 COMPLETE CBC W/AUTO DIFF WBC: CPT

## 2023-01-16 PROCEDURE — 2580000003 HC RX 258: Performed by: NURSE PRACTITIONER

## 2023-01-16 PROCEDURE — 96374 THER/PROPH/DIAG INJ IV PUSH: CPT

## 2023-01-16 PROCEDURE — 82550 ASSAY OF CK (CPK): CPT

## 2023-01-16 PROCEDURE — A4216 STERILE WATER/SALINE, 10 ML: HCPCS | Performed by: NURSE PRACTITIONER

## 2023-01-16 RX ADMIN — DAPTOMYCIN 850 MG: 500 INJECTION, POWDER, LYOPHILIZED, FOR SOLUTION INTRAVENOUS at 15:36

## 2023-01-16 NOTE — TELEPHONE ENCOUNTER
Patient says that he has the solution for the nebulizer but has not received the machine . Please contact him . He said he has an appt at the infusion center.  He says he has a little trouble hearing to speak up  when you call

## 2023-01-16 NOTE — PROGRESS NOTES
Arrived to the [facility]. Peripheral labs and Daptomycin pushed over 5 min. Completed without issues. Patient tolerated well. Any issues or concerns during appointment: none. Discharged in wheelchair with wife. Will FU with PCP.

## 2023-01-16 NOTE — TELEPHONE ENCOUNTER
[facility]      Name of the Nurse Calling and [facility] Number to Reach the Nurse: [phone]          Reason For Call:      Need verbal orders for wound care , open area on bottom , report he has wheezing in right lower lobe, increase back pain, worried about kidneys no symptoms of UTI, Using PRN medicine for pain , only taking Murelax once every 4 days how often he is having a BM, large stools.
Kaity notified of info below - she will call pt to notify him
Left message for Kaity to return call.
This is fine. Can use miralax daily as needed for constipation. Have him take big deep breaths on a regular basis to keep his lungs opened up. Thanks.   Dung Arambula
able to follow single-step instructions

## 2023-01-17 NOTE — TELEPHONE ENCOUNTER
Spoke to the patient's wife and informed her that I spoke to [employer] and they have the order for nebulizer machine. Originally they sent it to [place] office but they traced it and will process it.  Patient informed of conversation with Hui Singh, [address] [place]

## 2023-01-18 ENCOUNTER — CARE COORDINATION (OUTPATIENT)
Dept: CARE COORDINATION | Facility: CLINIC | Age: 78
End: 2023-01-18

## 2023-01-18 NOTE — CARE COORDINATION
[facility] Care Transitions Follow Up Call    LPN Care Coordinator contacted the family by telephone to follow up after admission on 22. Verified name and  with family as identifiers. Patient: Chaitanya Moreno  Patient : 1945   MRN: [id number]  Reason for Admission: COPD exacerbation  Discharge Date: 23 RARS: Readmission Risk Score: 21.3      Needs to be reviewed by the provider   Additional needs identified to be addressed with provider: No  none             Method of communication with provider: none. Mrs. Moira Sheth states patient is progressing well. He attended pcp visit and has completed antibiotic infusion. Received nebulizer solution and are awaiting delivery of nebulizer. No new needs or concerns voiced. Addressed changes since last contact:  none  Discussed follow-up appointments. If no appointment was previously scheduled, appointment scheduling offered: Yes. Is follow up appointment scheduled within 7 days of discharge? Yes. Follow Up  Future Appointments   Date Time Provider Mac Almaraz   2023 12:45 PM Lisandra Lee MD [facility] S[facility]   2023 11:30 AM Luna Camp MD [facility] AMB   2023  2:40 PM Kellie Espinosa APRN - CNP [facility] AMB   3/6/2023  2:45 PM Khoa Leung MD [facility] AMB     Non-[facility] follow up appointment(s): cassie    LPN Care Coordinator reviewed medical action plan with family and discussed any barriers to care and/or understanding of plan of care after discharge. Discussed appropriate site of care based on symptoms and resources available to patient including: PCP  Specialist  Home health. The family agrees to contact the PCP office for questions related to their healthcare. Advance Care Planning:   Decision maker verified .      Patients top risk factors for readmission:  comorbidities  Interventions to address risk factors:  continued compliance with plan of care    Offered patient enrollment in the Remote Patient Monitoring (RPM) program for in-home monitoring: NA.     Care Transitions Subsequent and Final Call    Schedule Follow Up Appointment with PCP: Completed  Subsequent and Final Calls  Do you have any ongoing symptoms?: No  Have your medications changed?: No  Do you have any questions related to your medications?: No  Do you currently have any active services?: Yes  Are you currently active with any services?: Home Health  Do you have any needs or concerns that I can assist you with?: No  Identified Barriers: None  Care Transitions Interventions  No Identified Needs  Other Interventions:             LPN Care Coordinator provided contact information for future needs. Plan for follow-up call in 10-14 days based on severity of symptoms and risk factors.   Plan for next call:  continued compliance with plan of care    Sun Byrd LPN

## 2023-01-19 ENCOUNTER — HOSPITAL ENCOUNTER (OUTPATIENT)
Dept: WOUND CARE | Age: 78
Discharge: HOME | End: 2023-01-19
Payer: MEDICARE

## 2023-01-19 ENCOUNTER — TELEPHONE (OUTPATIENT)
Dept: INTERNAL MEDICINE CLINIC | Facility: CLINIC | Age: 78
End: 2023-01-19

## 2023-01-19 VITALS
HEART RATE: 56 BPM | WEIGHT: 222 LBS | SYSTOLIC BLOOD PRESSURE: 113 MMHG | DIASTOLIC BLOOD PRESSURE: 84 MMHG | BODY MASS INDEX: 29.42 KG/M2 | RESPIRATION RATE: 18 BRPM | TEMPERATURE: 97.9 F | HEIGHT: 73 IN

## 2023-01-19 DIAGNOSIS — Z98.890 H/O ENDARTERECTOMY: ICD-10-CM

## 2023-01-19 DIAGNOSIS — E11.51 DM (DIABETES MELLITUS) TYPE II, CONTROLLED, WITH PERIPHERAL VASCULAR DISORDER (HCC): ICD-10-CM

## 2023-01-19 DIAGNOSIS — I73.9 PAD (PERIPHERAL ARTERY DISEASE) (HCC): ICD-10-CM

## 2023-01-19 DIAGNOSIS — E11.42 DIABETIC POLYNEUROPATHY ASSOCIATED WITH TYPE 2 DIABETES MELLITUS (HCC): ICD-10-CM

## 2023-01-19 DIAGNOSIS — L97.422 NON-PRESSURE CHRONIC ULCER OF LEFT HEEL AND MIDFOOT WITH FAT LAYER EXPOSED (HCC): Primary | ICD-10-CM

## 2023-01-19 DIAGNOSIS — R53.81 PHYSICAL DEBILITY: ICD-10-CM

## 2023-01-19 DIAGNOSIS — Z79.02 ANTIPLATELET OR ANTITHROMBOTIC LONG-TERM USE: ICD-10-CM

## 2023-01-19 DIAGNOSIS — I70.90: ICD-10-CM

## 2023-01-19 PROCEDURE — 15275 SKIN SUB GRAFT FACE/NK/HF/G: CPT

## 2023-01-19 RX ORDER — BETAMETHASONE DIPROPIONATE 0.05 %
OINTMENT (GRAM) TOPICAL ONCE
OUTPATIENT
Start: 2023-01-19 | End: 2023-01-19

## 2023-01-19 RX ORDER — GENTAMICIN SULFATE 1 MG/G
OINTMENT TOPICAL ONCE
OUTPATIENT
Start: 2023-01-19 | End: 2023-01-19

## 2023-01-19 RX ORDER — LIDOCAINE 40 MG/G
CREAM TOPICAL ONCE
OUTPATIENT
Start: 2023-01-19 | End: 2023-01-19

## 2023-01-19 RX ORDER — LIDOCAINE 50 MG/G
OINTMENT TOPICAL ONCE
OUTPATIENT
Start: 2023-01-19 | End: 2023-01-19

## 2023-01-19 RX ORDER — BACITRACIN, NEOMYCIN, POLYMYXIN B 400; 3.5; 5 [USP'U]/G; MG/G; [USP'U]/G
OINTMENT TOPICAL ONCE
OUTPATIENT
Start: 2023-01-19 | End: 2023-01-19

## 2023-01-19 RX ORDER — BACITRACIN ZINC AND POLYMYXIN B SULFATE 500; 1000 [USP'U]/G; [USP'U]/G
OINTMENT TOPICAL ONCE
OUTPATIENT
Start: 2023-01-19 | End: 2023-01-19

## 2023-01-19 RX ORDER — LIDOCAINE HYDROCHLORIDE 20 MG/ML
JELLY TOPICAL ONCE
OUTPATIENT
Start: 2023-01-19 | End: 2023-01-19

## 2023-01-19 RX ORDER — CLOBETASOL PROPIONATE 0.5 MG/G
OINTMENT TOPICAL ONCE
OUTPATIENT
Start: 2023-01-19 | End: 2023-01-19

## 2023-01-19 RX ORDER — LIDOCAINE HYDROCHLORIDE 40 MG/ML
SOLUTION TOPICAL ONCE
OUTPATIENT
Start: 2023-01-19 | End: 2023-01-19

## 2023-01-19 RX ORDER — GINSENG 100 MG
CAPSULE ORAL ONCE
OUTPATIENT
Start: 2023-01-19 | End: 2023-01-19

## 2023-01-19 NOTE — WOUND CARE
PuraPly AMTreatment Note    NAME:  Petr Shaw  YOB: 1945  MEDICAL RECORD NUMBER:  [id number]  DATE:  1/19/2023    Goal:  Patient will receive safe and proper application of skin substitute. Patient will comply with caring for dressing, and reporting complications. Expiration date checked immediately prior to use. Package intact prior to use and no damage noted. Transport temperature controlled and acceptable. PuraPly AM was removed from protective sterile packaging by provider and applied to prepared ulcer bed. PuraPly AM was hydrated with sterile normal saline per provider. PuraPly AM was applied to left heel and affixed with steri-strips by the provider. PuraPly AM was covered with non-adherent ulcer dressing. Applied mepilex transfer over non-adherent. Applied dry gauze and/or roll gauze. Patient/caregiver was instructed not to remove dressing and to keep it clean and dry. Pt/family/caregiver was instructed on signs and symptoms of complications to report such as draining through dressing, dressing falling down/slipping, getting wet, or severe pain or tingling. PuraPly AM may be applied a total of 10 times per wound over a 12 week period. Date of first application of PuraPly for this current wound is December 8, 2022.    Guidelines followed    Electronically signed by Yovana Hicks RN on 1/19/2023 at 1:46 PM

## 2023-01-19 NOTE — DISCHARGE INSTRUCTIONS
Discharge Instructions for  Windy Fairchild  [address] [place] [address]  [address], [address] [facility]  Phone [phone]   Fax [phone]      NAME:  Iliana Simental  YOB: 1945  MEDICAL RECORD NUMBER:  [id number]  DATE:  @ED@    Return Appointment:   1 week with Dotty Aviles MD      Instructions: left heel  Wound cleansed at today's visit with Vashe. Puraply (lot [id number]. 1.1j exp 4-29/25) applied at today's visit. Secured with mepilex transfer, abd and roll gauze. Leave in place until next appointment. Continue to offload at all times. Will plan for Puraply #4 at next visit. Should you experience increased redness, swelling, pain, foul odor, size of wound(s), or have a temperature over 101 degrees please contact the [address] [place] [address] Road at [phone] or if after hours contact your primary care physician or go to the hospital emergency department. PLEASE NOTE: IF YOU ARE UNABLE TO OBTAIN WOUND SUPPLIES, CONTINUE TO USE THE SUPPLIES YOU HAVE AVAILABLE UNTIL YOU ARE ABLE TO REACH US. IT IS MOST IMPORTANT TO KEEP THE WOUND COVERED AT ALL TIMES.     Electronically signed Mateo Kearns RN on 1/19/2023 at 1:38 PM

## 2023-01-19 NOTE — TELEPHONE ENCOUNTER
[address] [facility]      Name of the Nurse Calling and Facility: Karyle Ruffini [address] [place] [address] Number to Reach the Nurse: [phone]          Reason For Call: Get a verbal order to resume home health for PT , he was in the hospital and did not send paperwork to resume and did not notify he was in the hospital until he got out.  And hospital will not send once discharged

## 2023-01-19 NOTE — WOUND CARE
Discharge Instructions for  Windy Fairchild  [facility]  [address], [address]   Phone [phone]   Fax [phone]      NAME:  Chapin Treadwell OF BIRTH:  1945  MEDICAL RECORD NUMBER:  [id number]  DATE:  1/19/2023    Return Appointment:   1 week with Brad Cruz MD      Instructions: left heel  Wound cleansed at today's visit with Vashe. Puraply (lot [id number]. 1.1j exp 4-29/25) applied at today's visit. Secured with mepilex transfer, abd and roll gauze. Leave in place until next appointment. Continue to offload at all times. Will plan for Puraply #4 at next visit. Should you experience increased redness, swelling, pain, foul odor, size of wound(s), or have a temperature over 101 degrees please contact the [address] [place] [address] Road at [phone] or if after hours contact your primary care physician or go to the hospital emergency department. PLEASE NOTE: IF YOU ARE UNABLE TO OBTAIN WOUND SUPPLIES, CONTINUE TO USE THE SUPPLIES YOU HAVE AVAILABLE UNTIL YOU ARE ABLE TO REACH US. IT IS MOST IMPORTANT TO KEEP THE WOUND COVERED AT ALL TIMES.     Electronically signed Lety Mariee RN on 1/19/2023 at 1:34 PM

## 2023-01-19 NOTE — PROGRESS NOTES
[address] Whiten[address]  Consult Note/H&P/Progress Note      [address] RECORD NUMBER:  [id number]  AGE: 65 y.o. RACE White (non-)  GENDER: male  : 1945  EPISODE DATE:  2023       Subjective:      CC (Chief Complaint) and HISTORY of PRESENT ILLNESS (HPI):    Rajani Pearson is a 65 y.o. male who presents today for wound/ulcer evaluation. He presents on 10/24/2022 with an ulcer wound on the left heel that has been present for several months. He was in his usual state of poor health when in July he had back surgery which led to a complicated abscess, prolonged hospital stay, and rehabilitation stay which ended just a few weeks ago. He is currently at home. He has significant debility. He is diabetic and has significant peripheral vascular disease with history of bilateral femoral endarterectomies. These were performed several years ago by Dr. Rabia Esquivel. ABIs were performed in July just prior to his surgery with the JOVANA on the left of 0.6 with plan for follow-up this coming January to monitor. He has been lying in bed without use of Rooke boots and has developed an ulcer on the left heel. He has a history of MRSA infection in his back and was placed on a course of doxycycline. There is no evidence of active cellulitis today. He has had no imaging. He has not had repeat vascular evaluation. There is some description of pain in his leg at rest.  He is not active enough to discern any possible claudication. He is anticoagulated on both Eliquis and Plavix. There is no current dressing care plan. He returns on 10/31/2022. We are very fortunate that he was evaluated by vascular surgery who performed an arteriogram showing significant arterial disease. Unfortunately, his disease is not amenable to percutaneous intervention and will require a bypass in the coming weeks.   Debridement was not aggressively performed today but some loose tissue was selectively debrided and well-tolerated. He has been participating with home therapy including ambulation with a walker. Current dressing is Betadine. He returns on 11/10/2022. He is doing better with no evidence of cellulitis. He has completed antibiotics. The ulcer looks improved with current dressing of Betadine. He sees vascular on Monday to schedule bypass. He returns on 12/1/2022. He is actually improving with the heel ulcer showing improvement with just Betadine dressings. I believe he is offloading better than previously. I am not sure that the wife grasps the offloading and heel offloading concept. Vascular surgery has him set up for Femoral to distal bypass in January, but they feel he may heal without it. He was seen by ID who did not recommend further antibiotics for his foot or for his discitis. They recommend against the bypass if he continues to heal as well. He also developed issues with his eye with an acute glaucoma attack. He is being treated with nonsurgical methods at present but may need cataract surgery. He returns on 12/8/2022. There is some wound improvement. We performed skin substitute grafting with PuraPly AM #1 today which was well-tolerated. Offloading and protecting from pressure was again stressed. He returns on 12/15/2022. There is improvement and better offloading. PuraPly AM #2 was applied today and well-tolerated. He returns today on 1/19/2023. He was recently hospitalized with bacteremia of unknown source. The source was not his foot. He missed his last appointment due to this. He is doing better and the wound actually is improved probably from better offloading. PuraPly AM #3 was applied today and well tolerated.     This information was obtained from the patient  The following HPI elements were documented for the patient's wound:  Location: Left heel  Duration: August/September 2022  Severity: Fat layer exposed  Context: Limited activity, much lying in bed, PVD, DM, no offloading  Modifying Factors:  Nothing makes better or worse  Quality: Full-thickness, painful  Timing: Constant  Associated Signs and Symptoms: Tissue loss and possible bout of cellulitis      Ulcer Identification:  Ulcer Type: Diabetic foot ulcer left heel with fat layer exposed  Contributing Factors: Inadequate offloading, PVD, anticoagulated    PuraPlyAM: #1 (12/8/2022), #2 (12/15), #3 (1/19/2023)        PAST MEDICAL HISTORY  Past Medical History:   Diagnosis Date    Acute respiratory failure with hypoxia ([address] 10/23/2014    MINI (acute kidney injury) ([address] 09/15/2014    MINI (acute kidney injury) ([address]     MINI (acute kidney injury) ([address]     Allergic rhinitis 11/10/2015    Asthma 11/10/2015    Benign essential hypertension 11/10/2015    Benign neoplasm of colon 11/10/2015    CAD (coronary artery disease) 11/10/2015    CAD (coronary artery disease) 1998, 1999    mix2, 3 stents [id number]    CAP (community acquired pneumonia) 12/31/2020    Cardiomyopathy ([address] 49/96/0255    Complicated wound infection 11/10/2015    COPD (chronic obstructive pulmonary disease) with emphysema ([address] 11/10/2015    COPD exacerbation ([address] 10/12/2011    COVID-19 12/31/2020    Diabetes mellitus type 2, controlled ([address] 11/10/2015    doesn/t check daily oral meds, A1c 6.0 (8/10/22)    Diabetic neuropathy ([address] 11/10/2015    Disruption of wound, unspecified 10/09/2014    Encounter for long-term (current) use of other high-risk medications 11/10/2015    Extremity atherosclerosis with intermittent claudication ([address] 11/10/2015    H/O endarterectomy 11/10/2015    Hiatal hernia 11/10/2015    History of 2019 novel coronavirus disease (COVID-19)     12/31/2020- hospitalized    Hyperglycemia due to type 1 diabetes mellitus ([address] 11/10/2015    Hyperlipidemia 11/10/2015    ICD (implantable cardioverter-defibrillator) in place 06/16/2022    Monomorphic ventricular tachycardia 12/31/2020    Myocardial infarction [facility]) 1999    Myocardial infarction ([facility] 11/10/2015    Obesity 11/10/2015    Orthostatic hypotension 11/10/2015    Osteoarthritis 11/10/2015    Peripheral vascular disease ([facility] 11/10/2015    PNA (pneumonia) 02/08/2019    PVC (premature ventricular contraction)     Rash 27/13/8653    Seroma complicating a procedure 10/02/2014    Sleep apnea     cpap    Toe laceration     Type 2 diabetes with nephropathy [facility]     Uncontrolled type 2 diabetes mellitus 11/10/2015    Vertiginous syndromes and other disorders of vestibular system 11/10/2015        PAST SURGICAL HISTORY  Past Surgical History:   Procedure Laterality Date    CARDIAC CATHETERIZATION  12/05/2018    LV EF=40%. LM:nl. LAD:30-40% mid stenosis. LCX OM1:95% stenosis. RCA:100% occlusion at RV branch.     CATARACT REMOVAL Right 07/20/2022    CORONARY ANGIOPLASTY WITH STENT PLACEMENT  12/05/2018    LCX OM1:2.5 x 12 [place] RAKESH    CORONARY ANGIOPLASTY WITH STENT PLACEMENT  1999    OR UNLISTED PROCEDURE ABDOMEN PERITONEUM & OMENTUM  1960    abdominal cyst removed     [address]    stents x3    SPINE SURGERY N/A 07/19/2022    LUMBAR 2, LUMBAR 4 KYPHOPLASTY AND ANY OTHER INDICATED LEVELS performed by Lindsay Boateng MD at [facility] MAIN OR    VASCULAR SURGERY  11/5/14    Repair of right common femoral artery     VASCULAR SURGERY  9/11/14    tiffanie femoral endarterectomy    VASCULAR SURGERY Left 10/28/2022    LEFT LOWER EXTREMITY ARTERIOGRAM / ULTRASOUND GUIDED ACCESS   performed by Adam Lewis MD at [facility] MAIN OR       FAMILY HISTORY  Family History   Problem Relation Age of Onset    Breast Cancer Mother     Hypertension Mother     Other Father         DIVERTICULITIS    Crohn's Disease Sister     Lung Disease Brother         mesothioloma    Diabetes Sister     Heart Attack Father     Heart Disease Father     Stroke Mother        SOCIAL HISTORY  Social History     Tobacco Use    Smoking status: Former     Packs/day: 1.00 Years: 50.00     Pack years: 50.00     Types: Cigarettes     Quit date: 10/2/2011     Years since quittin.3    Smokeless tobacco: Current     Types: Chew    Tobacco comments:     Chews tobacco daily   Vaping Use    Vaping Use: Never used   Substance Use Topics    Alcohol use: Yes     Alcohol/week: 0.0 standard drinks    Drug use: No       ALLERGIES  Allergies   Allergen Reactions    Acetaminophen Hives     Per spouse has small amount of hives, takes 1/2 and tolerates     Azithromycin Hives    Morphine Hallucinations     Muscle twitching. jerking    Oxaprozin Other (See Comments)     ELEVATED BLOOD PRESSURE    Oxycodone Anxiety       MEDICATIONS  Current Outpatient Medications on File Prior to Encounter   Medication Sig Dispense Refill    montelukast (SINGULAIR) 10 MG tablet TAKE 1 TABLET BY MOUTH EVERY DAY AT BEDTIME 90 tablet 3    magnesium oxide (MAG-OX) 400 (240 Mg) MG tablet TAKE 1 TABLET BY MOUTH EVERY DAY 90 tablet 3    albuterol (PROVENTIL) (2.5 MG/3ML) 0.083% nebulizer solution 1 vial via nebulizer 4 times daily if needed for shortness of breath or wheezing. Diagnosis-COPD, J44.9. Bill to Medicare part B 360 mL 11    carvedilol (COREG) 25 MG tablet Take 1 tablet by mouth 2 times daily (with meals) 60 tablet 11    senna-docusate (PERICOLACE) 8.6-50 MG per tablet Take 1 tablet by mouth nightly      valsartan (DIOVAN) 80 MG tablet Take 1 tablet by mouth daily 30 tablet 5    CPAP Machine MISC by Does not apply route      albuterol sulfate HFA (PROVENTIL;VENTOLIN;PROAIR) 108 (90 Base) MCG/ACT inhaler USE 2 PUFFS BY INHALATION 4 TIMES DAILY AS NEEDED FOR WHEEZING OR SHORTNESS OF BREATH. Patient prefers ventolin. 18 g 11    GUAIFENESIN 1200 PO Take 1 tablet by mouth every 12 hours as needed      tamsulosin (FLOMAX) 0.4 MG capsule Take 0.4 mg by mouth daily      traMADol (ULTRAM) 50 MG tablet Take 50 mg by mouth every 6 hours as needed for Pain.  Taking 1/2 every morning      acetaminophen (TYLENOL) 500 MG tablet Take 500 mg by mouth every 6 hours as needed for Pain Taking 1/2 two times daily      glipiZIDE (GLUCOTROL XL) 10 MG extended release tablet Take 1 tablet by mouth daily 30 tablet 0    furosemide (LASIX) 20 MG tablet Take 1 tablet by mouth in the morning. (Patient taking differently: Take 20 mg by mouth daily 1/2 tablet) 60 tablet 3    [DISCONTINUED] amLODIPine (NORVASC) 5 MG tablet Take 1 tablet by mouth in the morning. 30 tablet 3    [DISCONTINUED] QUEtiapine (SEROQUEL) 25 MG tablet Take 1 tablet by mouth in the morning. 60 tablet 3    rosuvastatin (CRESTOR) 10 MG tablet TAKE 1 TABLET BY MOUTH EVERY DAY (Patient taking differently: at bedtime) 90 tablet 0    fluticasone-salmeterol (ADVAIR) 250-50 MCG/ACT AEPB diskus inhaler Inhale 1 puff into the lungs in the morning and at bedtime      amiodarone (CORDARONE) 200 MG tablet Take 200 mg by mouth daily      apixaban (ELIQUIS) 5 MG TABS tablet Take 5 mg by mouth every 12 hours      ascorbic acid (VITAMIN C) 500 MG tablet Take 500 mg by mouth      Cholecalciferol 50 MCG (2000 UT) TABS Take 2,000 Units by mouth      clopidogrel (PLAVIX) 75 MG tablet Take 75 mg by mouth daily      fluticasone (FLONASE) 50 MCG/ACT nasal spray USE 1 OR 2 SPRAYS IN EACH NOSTRIL EVERY DAY. latanoprost (XALATAN) 0.005 % ophthalmic solution Apply 1 drop to eye nightly      nitroGLYCERIN (NITROSTAT) 0.4 MG SL tablet Place 0.4 mg under the tongue      [DISCONTINUED] metFORMIN (GLUCOPHAGE) 500 MG tablet TAKE TWO TABLETS BY MOUTH 2 TIMES A DAY       No current facility-administered medications on file prior to encounter. REVIEW OF SYSTEMS  The patient has no difficulty with chest pain or shortness of breath. No fever or chills. Comprehensive review of systems was otherwise unremarkable except as noted above.       Objective:   Physical Exam:   Recent vitals (if inpt):  Patient Vitals for the past 24 hrs:   BP Temp Temp src Pulse Resp Height Weight   01/19/23 1312 -- -- -- -- -- 6' 1\" (1.854 m) 222 lb (100.7 kg)   01/19/23 1309 113/84 97.9 °F (36.6 °C) Oral 56 18 -- --       /84   Pulse 56   Temp 97.9 °F (36.6 °C) (Oral)   Resp 18   Ht 6' 1\" (1.854 m)   Wt 222 lb (100.7 kg)   BMI 29.29 kg/m²   Wt Readings from Last 3 Encounters:   01/19/23 222 lb (100.7 kg)   01/12/23 222 lb 12.8 oz (101.1 kg)   01/12/23 232 lb (105.2 kg)     Constitutional: Alert, oriented, cooperative patient in no acute distress; appears stated age;  General appearance is within normal limits for wound care patient population. See the today's recorded vitals signs and constitutional data. Eyes: Pupils equal; Sclera are clear. EOMs intact; The eyes appear to track and move normally. The sclera are not injected. The conjunctive are clear. The eyelids are normal. There is no scleral icterus. ENMT: There are no obvious external ear, nose, lip or mouth lesions. Nares normal; Neck: Overall contour of the neck is normal with no obvious neck masses. Gross hearing is within normal limits. No obvious neck masses  Resp:  Breathing is non-labored; normal rate and effort; no audible wheezing. CV: RRR;  no JVD; No evidence of cyanosis of the upper extremities. The extremities are perfused without embolic sign, splinter hemorrhages, or petechia. GI: soft and non-distended without acute abnormality noted. Musculoskeletal: unremarkable with normal function. No embolic signs or cyanosis. Neuro:  Oriented; moves all 4; no focal deficits  Psychiatric: Judgement and insight are within normal limits for the wound care population of patients. Patient is oriented to person, place, and time. Recent and remote memory are within normal limits. Mood and affect are within normal limits. Integumentary (Skin/Wounds)  See inspection of wound(s) below. There are no other skin areas of palpable concern.    See wound center documentation including photos in the [facility] [address] Wound Template made part of this record by reference. Wound Care Documentation:    Wound 09/09/22 Heel Left;Lateral (Active)   Wound Image   01/19/23 1312   Wound Etiology Diabetic Martinez 2 01/19/23 1312   Dressing Status Clean;Dry; Intact 01/19/23 1312   Wound Cleansed Vashe 01/19/23 1312   Dressing/Treatment Foam 01/19/23 1312   Offloading for Diabetic Foot Ulcers Other (comment) 12/08/22 1337   Wound Length (cm) 0.7 cm 01/19/23 1312   Wound Width (cm) 1.2 cm 01/19/23 1312   Wound Depth (cm) 0.1 cm 01/19/23 1312   Wound Surface Area (cm^2) 0.84 cm^2 01/19/23 1312   Change in Wound Size % (l*w) 86 01/19/23 1312   Wound Volume (cm^3) 0.084 cm^3 01/19/23 1312   Wound Healing % 97 01/19/23 1312   Wound Assessment Granulation tissue 01/19/23 1312   Drainage Amount Moderate 01/19/23 1312   Drainage Description Serosanguinous 01/19/23 1312   Odor None 01/19/23 1312   Joanna-wound Assessment Intact 01/19/23 1312   Margins Attached edges 10/31/22 1115   Wound Thickness Description not for Pressure Injury Full thickness 01/19/23 1312   Number of days: 132       Wound 09/13/22 Buttocks moisture skin damage (Active)   Number of days: 128        Initial WC visit 10/24/2022      F/U on 12/1/2022    Amount and/or Complexity of Data Reviewed and Analyzed:  I reviewed and analyzed all of the unique labs and radiologic studies that are shown below as well as any that are in the HPI, and any that are in the expanded problem list below  *Each unique test, order, or document contributes to the combination of 2 or combination of 3 in Category 1 below. I also independently reviewed radiology images for studies I described above in the HPI or studies I have ordered. For this visit I also reviewed old records and prior notes. No results for input(s): WBC, HGB, PLT, NA, K, CL, CO2, BUN, CREA, GLU, INR, APTT, ALT, AML, AML, LCAD, PCO2, PO2, HCO3 in the last 72 hours.     Invalid input(s): PTP, TBIL, TBILI, CBIL, SGOT, GPT, AP, LPSE, NH4, TROPT, TROIQ,  PH    No results for input(s): INR, APTT, ALT, AML in the last 72 hours. Invalid input(s): PTP, TBIL, TBILI, CBIL, SGOT, GPT, AP, LPSE      Most recent blood counts (CBC) review  Lab Results   Component Value Date    WBC 5.7 01/16/2023    HGB 10.0 (L) 01/16/2023    HCT 30.9 01/16/2023    MCV 94.8 01/16/2023     01/16/2023     Most recent chemistry (BMP) test review   Lab Results   Component Value Date/Time     01/12/2023 08:37 AM    K 4.0 01/12/2023 08:37 AM     01/12/2023 08:37 AM    CO2 28 01/12/2023 08:37 AM    BUN 9 01/12/2023 08:37 AM    CREATININE 1.20 01/16/2023 03:20 PM    GLUCOSE 118 01/12/2023 08:37 AM    CALCIUM 8.9 01/12/2023 08:37 AM      Most recent Liver enzyme test review  Lab Results   Component Value Date    ALT 25 01/16/2023    AST 13 (L) 01/16/2023    ALKPHOS 84 01/16/2023    BILITOT 0.5 01/16/2023     Most recent coagulation (coags) review  @lastinr@  Lab Results   Component Value Date/Time    INR 1.3 07/30/2022 09:53 AM    APTT 63.6 08/02/2022 05:41 AM    APTT 24.3 03/04/2022 03:57 PM       Diabetic assessment  Hemoglobin A1C   Date Value Ref Range Status   12/29/2022 5.6 4.8 - 5.6 % Final       Nutritional assessment screen to assess wound healing ability:  Lab Results   Component Value Date    LABALBU 3.2 01/16/2023     No results found for: TP, ALBR, ALB    Xray Result (most recent):  XR CHEST PORTABLE 12/29/2022    Narrative  CHEST X-RAY, single portable view  12/29/2022    History: Hypoxic. Recent pneumonia. Technique: Single frontal view of the chest.    Comparison: Chest x-ray 9/2/2022    Findings:  A grossly stable left-sided intracardiac device is seen. The cardiac silhouette  is mildly enlarged although decreased in size from the prior comparison study. The lungs are expanded without evidence for pneumothorax. No consolidative  airspace process or pleural effusion is seen.  Only stable basilar reticular  interstitial prominence is seen most consistent with scarring or potential  atelectasis. Impression  1. Only mild cardiomegaly seen which does appear improved from the prior study. Some component of early or mild heart failure is difficult to exclude given the  patient's acute symptoms, however. No potential acute process is otherwise  suggested. CT Result (most recent):  CT HEAD WO CONTRAST 12/29/2022    Narrative  CT HEAD WITHOUT CONTRAST, 12/29/2022    History: Altered mental status. Comparison: None    Technique:   5 mm axial scans from the skull base to the vertex. All CT scans  performed at this facility use one or all of the following: Automated exposure  control, adjustment of the mA and/or kVp according to patient's size, iterative  reconstruction. Findings:  No evidence of intracranial hemorrhage is seen. No abnormal  extra-axial fluid collections are seen. Age related chronic cortical volume  loss is seen. No evidence for acute hydrocephalus is seen. No evidence of  midline shift or herniation is seen. No abnormal edema pattern is seen in a  vascular distribution to suggest large artery infarction. Evaluation with bone windows shows no acute osseous changes. There is a  moderate right mastoid effusion with fluid entering the right middle ear. Impression  1. No acute intracranial process evident by noncontrast CT study of the head. 2.  Moderate right mastoid effusion with fluid entering the right middle ear. This can been indicator for right otomastoiditis. Recommend clinical  correlation. This report was made using voice transcription. Despite my best efforts to avoid  any, transcription errors may persist. If there is any question about the  accuracy of the report or need for clarification, then please call [phone], or text me through perfectserv for clarification or correction.     07/06/22    VAS DUP LOWER EXT ARTERIES BILATERAL W JOVANA 07/07/2022  7:57 AM (Final)    Interpretation Summary    Right lower extremity: The JOVANA (ankle-brachial index) is 0.78 and is abnormal. The lower extremity arterial duplex reveals an occlusion of the proximal superficial femoral artery with reconstitution at the distal proximal superficial femoral artery. There is monophasic flow in the GILBERTO, PTA and peroneal arteries at the ankle. The great toe pressure is 64mmHg. Left lower extremity: Right lower extremity: The JOVANA (ankle-brachial index) is 0.60 and is abnormal. The lower extremity arterial duplex reveals an occlusion of the proximal superficial femoral artery with reconstitution at the below knee popliteal artery. There is monophasic flow in the GILBERTO, PTA and peroneal arteries at the ankle. The great toe pressure is 63mmHg. The abdominal aorta was evaluated and measured 2.8cm x 2.9cm at its maximum diameter, no aneurysm visualized on limited evaluation of abdominal aorta. Signed by: Era Hammans, MD on 7/7/2022  7:57 AM        Admission date (for inpatients): 1/19/2023   Day of Surgery  * No procedures listed *    Procedure:  Skin Substitute Application    Performed by: Tracie Hernandes MD  Ulcer Type: arterial and diabetic  Consent obtained: Yes  Time out taken: Yes    Product Utilized:  PuraPly AM 1.6 sq/cm  Fenestrated at the time of procedure: Yes  Instrument(s) utilized during the procedure: #15 surgical blade    Skin Substitute was Applied to Ulcer Number(s):    Ulcer #: 1  Total Surface Area of Ulcer(s) Covered 0.84 sq/cm  Was the Product Layered  No   Amount of Product Applied 1.6 sq/cm   Amount of Product Wasted 0 sq/cm   Reason for Waste: N/A. Skin Substitute was surgically fixated and secured with Other: silicone dressing. Wound 09/09/22 Heel Left;Lateral (Active)   Wound Image   01/19/23 1312   Wound Etiology Diabetic Martinez 2 01/19/23 1312   Dressing Status Clean;Dry; Intact 01/19/23 1312   Wound Cleansed Vashe 01/19/23 1312   Dressing/Treatment Foam 01/19/23 1312   Offloading for Diabetic Foot Ulcers Other (comment) 12/08/22 1337   Wound Length (cm) 0.7 cm 01/19/23 1312   Wound Width (cm) 1.2 cm 01/19/23 1312   Wound Depth (cm) 0.1 cm 01/19/23 1312   Wound Surface Area (cm^2) 0.84 cm^2 01/19/23 1312   Change in Wound Size % (l*w) 86 01/19/23 1312   Wound Volume (cm^3) 0.084 cm^3 01/19/23 1312   Wound Healing % 97 01/19/23 1312   Wound Assessment Granulation tissue 01/19/23 1312   Drainage Amount Moderate 01/19/23 1312   Drainage Description Serosanguinous 01/19/23 1312   Odor None 01/19/23 1312   Joanna-wound Assessment Intact 01/19/23 1312   Margins Attached edges 10/31/22 1115   Wound Thickness Description not for Pressure Injury Full thickness 01/19/23 1312   Number of days: 132       Wound 09/13/22 Buttocks moisture skin damage (Active)   Number of days: 128      Procedural Pain: 0/10   Post Procedural Pain: 0 / 10  Response to Treatment: Patient tolerated procedure well with no complaints of pain.       Assessment:      Problem List Items Addressed This Visit          Circulatory    Arterial degeneration    Relevant Orders    Initiate Outpatient Wound Care Protocol    PAD (peripheral artery disease) ([address]    Relevant Orders    Initiate Outpatient Wound Care Protocol    DM (diabetes mellitus) type II, controlled, with peripheral vascular disorder ([address]    Relevant Orders    Initiate Outpatient Wound Care Protocol    [place] DME SUPPLY OR ACCESSORY, NOS       Endocrine    Diabetic neuropathy ([address]    Relevant Orders    Initiate Outpatient Wound Care Protocol       Other    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 *  Resolved Problems:    * No resolved hospital problems.  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 ([facility] 10/24/2022     Priority: High    Antiplatelet or antithrombotic long-term use 10/24/2022     Priority: High     Eliquis and Plavix      Chest pain 12/05/2018     Priority: High    COPD exacerbation ([facility] 12/29/2022     Priority: Medium    Influenza 12/29/2022     Priority: Medium    Lumbar discitis 09/08/2022     Priority: Medium    Psoas muscle abscess ([facility] 09/08/2022     Priority: Medium    Physical debility 08/26/2022     Priority: Medium    General weakness 07/27/2022     Priority: Medium    Acute cystitis without hematuria 07/27/2022     Priority: Medium    Low back pain without sciatica 07/27/2022     Priority: Medium    Back pain 07/27/2022     Priority: Medium    DNI (do not intubate) 07/27/2022     Priority: Medium    Elevated liver enzymes 07/27/2022     Priority: Medium    Anemia 07/27/2022     Priority: Medium    Hyponatremia 07/27/2022     Priority: Medium    ICD (implantable cardioverter-defibrillator) in place 06/16/2022     Priority: Medium    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, vertebra(e), init ([facility] 07/07/2022     Priority: Low     Added automatically from request for surgery [id number]      Atrial fibrillation ([facility] 03/04/2022     Priority: Low    Ventricular tachycardia 03/04/2022     Priority: Low    VT (ventricular tachycardia) 03/04/2022     Priority: Low    Acute metabolic encephalopathy 97/33/8631     Priority: Low    Irregular heart beat 03/02/2021     Priority: Low    PVC's (premature ventricular contractions) 03/02/2021     Priority: Low    Elevated troponin 12/31/2020     Priority: Low    Toe laceration 12/09/2019     Priority: Low    Type 2 diabetes with nephropathy ([facility] 06/17/2019     Priority: Low    Hypoxia 02/08/2019     Priority: Low    Sepsis ([facility] 02/08/2019     Priority: Low    Abnormal nuclear cardiac imaging test 11/27/2018     Priority: Low    Cigarette nicotine dependence in remission 08/20/2018     Priority: Low    Chronic pain of right knee 12/14/2017     Priority: Low    Obstructive sleep apnea 08/11/2017     Priority: Low    Intermittent spinal claudication ([address] 06/13/2017     Priority: Low    Pulmonary emphysema ([facility] 11/17/2016     Priority: Low    H/O endarterectomy 11/10/2015     Priority: Low    Complicated wound infection 11/10/2015     Priority: Low    Allergic rhinitis 11/10/2015     Priority: Low    Hiatal hernia 11/10/2015     Priority: Low    Diabetic neuropathy ([facility] 11/10/2015     Priority: Low    Osteoarthritis 11/10/2015     Priority: Low    Encounter for long-term (current) drug use 11/10/2015     Priority: Low    Benign neoplasm of colon 11/10/2015     Priority: Low    PAD (peripheral artery disease) ([facility] 11/10/2015     Priority: Low    Asthma 11/10/2015     Priority: Low    Orthostatic hypotension 11/10/2015     Priority: Low    Hyperlipidemia 11/10/2015     Priority: Low    S/P angioplasty with stent 11/10/2015     Priority: Low    COPD (chronic obstructive pulmonary disease) ([facility] 04/10/2015     Priority: Low    Arterial degeneration 11/05/2014     Priority: Low     11/6/14 (Dr Ellis Wren) 1. Bleeding from right groin wound. 2. Disruption of right common femoral artery patch anastomosis and   possible arterial infection. Normocytic anemia 10/23/2014     Priority: Low    MINI (acute kidney injury) ([facility] 09/15/2014     Priority: Low    DM (diabetes mellitus) type II, controlled, with peripheral vascular disorder ([facility] 09/11/2014     Priority: Low    Atherosclerosis of native arteries of extremity with intermittent claudication ([facility] 08/15/2014     Priority: Low     9/11/14 (Dr Ellis Wren) Bilateral common femoral endarterectomies.         Personal history of tobacco use, presenting hazards to health 02/12/2013     Priority: Low    Asbestos exposure 02/12/2013     Priority: Low    HTN (hypertension) 10/12/2011     Priority: Low    Severe obesity (BMI 35.0-39. 9) with comorbidity ([address] 10/12/2011     Priority: Low    Sleep apnea 10/12/2011     Priority: Low    Ischemic cardiomyopathy 10/12/2011     Priority: Low    Coronary artery disease involving native coronary artery of native heart without angina pectoris 10/12/2011     Priority: Low           Plan:     Number and Complexity of Problems addressed and   Risks of complications and/or morbidity of management    Treatment Note please see attached Discharge Instructions  Any procedures done today in the wound center (if documented in a separate note) in [facility] are made part of this record by reference  Written patient dismissal instructions given to patient or POA. 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 He has had an ulcer on the left heel for several months. He has been debilitated following back surgery but is also debilitated from multiple medical problems including cardiac disease, peripheral vascular disease, and diabetes. He is anticoagulated on Eliquis and Plavix. He has had femoral endarterectomies in the past and is now followed by Dr. Rock Oviedo. JOVANA in July was abnormal and has not been reevaluated since deterioration of his tissue. He is scheduled for repeat JOVANA in January and we will consult vascular surgery for earlier evaluation. Vascular consultation to follow as needed. He has not been offloading despite having work boots.   There is clearly a pressure component. There is no mirror lesion on the right heel and may reflect his discrepancy in blood supply. He does describe some episodes that could be rest pain though it does not drop his legs for improvement. This may also be limited by his debility. We will not do debridement today. We will dress with Betadine and an absorbent pad and follow-up in 1 week. Offloading was emphasized. He returns on 10/31/2022. He underwent arteriography but is not a candidate for percutaneous intervention. He is being set up for formal bypass in November. Therapy is having him walk on his foot and offloading was again stressed. Current dressing remains Betadine. I will see him back in 1.5 weeks which will be before his bypass surgery. He returns on 11/10/2022. He is improved and has completed antibiotics. Current dressing is Betadine. He will see vascular surgery on Monday to schedule bypass surgery. He returns on 12/1/2022. He was seen by vascular surgery who have set him up for femoral to distal bypass in January, but feel it may not be necessary as he continues to heal.  This is probably secondary to better offloading. He was also seen by ID who also feel that the bypass should be avoided if possible since he is continuing to heal.  I agree with both specialists that he is continuing to heal and we will move forward with adjuncts to healing. We will start with skin substitutes. I will order PuraPly AM for next visit. This would be the best place to start when we cannot perform aggressive debridement due to his vascular status. We will then transition to a growth factor product. Returns on 12/8/2022. There continues to be some improvement. Still some issues with understanding offloading. PuraPly AM #1 was applied today and well-tolerated. We will order a graft for next week. He returns on 12/15/2022. There is some improvement. They have better understanding of offloading.   PuraPly AM #2 was applied today and well-tolerated. He returns on 1/19/2022. He missed an appointment because he was hospitalized with bacteremia of unknown source. It was not from his foot. The foot wound has actually improved, likely from improved offloading while being hospitalized. PuraPly AM #3 was applied today and well-tolerated. We will order another graft for next week and see him back in 1 week.     Wound Care  Orders Placed This Encounter   Procedures    Initiate Outpatient Wound Care Protocol     Cleanse wound with saline    If wound contains bioburden or contamination cleanse with wound cleanser or antimicrobial solution     For normal periwound tissue without irritation nor maceration, apply topical skin protectant    For periwound tissue with irritation and/or maceration, apply zinc based product, topical steroid cream/ointment, or equivalent     For wounds with dry firm black eschar and/or without exudate, apply betadine and leave open to air      For wounds with scant/small to no exudate or drainage, apply wound gel, hydrocolloid, polymer, or equivalent and cover with secondary dressing/foam      For wounds with moderate/large exudate or drainage, apply alginate, hydrofiber, polymer, or equivalent and cover with secondary dressing/foam    For wounds with nonviable tissue requiring removal, apply chemical or mechanical debrider and cover with secondary dressing/foam    For wounds with tunneling, dead space, or cavity, fill or pack with strip/gauze/kerlex to fit and cover with secondary dressing/foam    For wounds with adequate granulation or epithelization, apply wound gel, hydrocolloid, polymer, collagen, or transparent film, and cover secondary dry dressing/foam    For wounds that need additional secondary dressing to help pad or control additional drainage/exudates, add foam, absorbent pad or hydrocolloid    For wounds with suspected or known infection, apply antimicrobial mesh and/or antimicrobial alginate/hydrofiber, or antimicrobial solution moistened gauze/kerlex, or equivalent and cover with secondary dressing/foam    Compression Management needed for edema control, apply multilayer compression or tubular garment or equivalent    Offloading Management needed for pressure relief, apply offloading shoe/boot or equivalent     Standing Status:   Standing     Number of Occurrences:   1    VT DME SUPPLY OR ACCESSORY, NOS     PuraPlyAM 1.6 cm disc for next visit       Return Appointment:   1 week with Colleen Templeton MD        Instructions: left heel  Wound cleansed at today's visit with Vashe. Puraply (lot [id number]. 1.1j exp 4-29/25) applied at today's visit. Secured with mepilex transfer, abd and roll gauze. Leave in place until next appointment. Continue to offload at all times. Will plan for Puraply #4 at next visit. Level of MDM (2/3 elements below)  Number and Complexity of Problems Addressed Amount and/or Complexity of Data to be Reviewed and Analyzed  *Each unique test, order, or document contributes to the combination of 2 or combination of 3 in Category 1 below. Risk of Complications and/or Morbidity or Mortality of pt Management     51062  37144 SF Minimal  ?1self-limited or minor problem Minimal or none Minimal risk of morbidity from additional diagnostic testing or Rx   00384  54736 Low Low  ? 2or more self-limited or minor problems;    or  ? 1stable chronic illness;    or  ?1acute, uncomplicated illness or injury   Limited  (Must meet the requirements of at least 1 of the 2 categories)  Category 1: Tests and documents   ? Any combination of 2 from the following:  ?Review of prior external note(s) from each unique source*;  ?review of the result(s) of each unique test*;   ?ordering of each unique test*    or   Category 2: Assessment requiring an independent historian(s)  (For the categories of independent interpretation of tests and discussion of management or test interpretation, see moderate or high) Low risk of morbidity from additional diagnostic testing or treatment     50188  80388 Mod Moderate  ? 1or more chronic illnesses with exacerbation, progression, or side effects of treatment;    or  ?2or more stable chronic illnesses;    or  ?1undiagnosed new problem with uncertain prognosis;    or  ?1acute illness with systemic symptoms;    or  ?1acute complicated injury   Moderate  (Must meet the requirements of at least 1 out of 3 categories)  Category 1: Tests, documents, or independent historian(s)  ? Any combination of 3 from the following:   ?Review of prior external note(s) from each unique source*;  ?Review of the result(s) of each unique test*;  ?Ordering of each unique test*;  ?Assessment requiring an independent historian(s)    or  Category 2: Independent interpretation of tests   ? Independent interpretation of a test performed by another physician/other qualified health care professional (not separately reported);     or  Category 3: Discussion of management or test interpretation  ? Discussion of management or test interpretation with external physician/other qualified health care professional/appropriate source (not separately reported)   Moderate risk of morbidity from additional diagnostic testing or treatment  Examples only:  ?Prescription drug management - advanced wound care prescription Rx wound care products  (eg Iodosorb, hydrofera, silvadene, collagen, puracol plus, optiloc, biologics - placenta, Theraskin, Apligraf). Note also that if home health care is involved, they will not apply topical therapies without a prescription/order from physician      ? Decision regarding minor surgery with identified patient or procedure risk factors  ? Decision regarding elective major surgery without identified patient or procedure risk factors   ? Diagnosis or treatment significantly limited by social determinants of health       [id number] High High  ? 1or more chronic illnesses with severe exacerbation, progression, or side effects of treatment;    or  ?1 acute or chronic illness or injury that poses a threat to life or bodily function   Extensive  (Must meet the requirements of at least 2 out of 3 categories)  Category 1: Tests, documents, or independent historian(s)  ? Any combination of 3 from the following:   ?Review of prior external note(s) from each unique source*;  ?Review of the result(s) of each unique test*;   ?Ordering of each unique test*;   ?Assessment requiring an independent historian(s)    or   Category 2: Independent interpretation of tests   ? Independent interpretation of a test performed by another physician/other qualified health care professional (not separately reported);     or  Category 3: Discussion of management or test interpretation  ? Discussion of management or test interpretation with external physician/other qualified health care professional/appropriate source (not separately reported)   High risk of morbidity from additional diagnostic testing or treatment  Examples only:  ?Drug therapy requiring intensive monitoring for toxicity  ? Decision regarding elective major surgery with identified patient or procedure risk factors  ? Decision regarding emergency major surgery  ? Decision regarding hospitalization  ? Decision not to resuscitate or to de-escalate care because of poor prognosis                 I have personally performed a face-to-face diagnostic evaluation and management  service on this patient. I have independently seen the patient. I have independently obtained the above history from the patient/family. I have independently examined the patient with above findings. I have independently reviewed data/labs for this patient and developed the above plan of care (MDM).       Electronically signed by Jinny Ulrich MD on 1/19/2023 at 5:25 PM

## 2023-01-19 NOTE — FLOWSHEET NOTE
01/19/23 1312   Wound 09/09/22 Heel Left;Lateral   Date First Assessed/Time First Assessed: 09/09/22 1535   Present on Hospital Admission: Yes  Primary Wound Type: Diabetic Ulcer  Location: Heel  Wound Location Orientation: Left;Lateral   Wound Image    Wound Etiology Pressure Stage 3   Dressing Status Clean;Dry; Intact   Wound Cleansed Vashe   Dressing/Treatment Foam   Wound Length (cm) 0.7 cm   Wound Width (cm) 1.2 cm   Wound Depth (cm) 0.1 cm   Wound Surface Area (cm^2) 0.84 cm^2   Change in Wound Size % (l*w) 86   Wound Volume (cm^3) 0.084 cm^3   Wound Healing % 97   Wound Assessment Granulation tissue   Drainage Amount Moderate   Drainage Description Serosanguinous   Odor None   Joanna-wound Assessment Intact   Wound Thickness Description not for Pressure Injury Full thickness

## 2023-01-26 ENCOUNTER — HOSPITAL ENCOUNTER (OUTPATIENT)
Dept: WOUND CARE | Age: 78
Discharge: HOME | End: 2023-01-26
Payer: MEDICARE

## 2023-01-26 ENCOUNTER — OFFICE VISIT (OUTPATIENT)
Dept: CARDIOLOGY CLINIC | Age: 78
End: 2023-01-26
Payer: MEDICARE

## 2023-01-26 VITALS
WEIGHT: 226 LBS | DIASTOLIC BLOOD PRESSURE: 87 MMHG | TEMPERATURE: 97 F | HEIGHT: 73 IN | BODY MASS INDEX: 29.95 KG/M2 | SYSTOLIC BLOOD PRESSURE: 126 MMHG | HEART RATE: 58 BPM

## 2023-01-26 VITALS
HEART RATE: 60 BPM | WEIGHT: 226 LBS | BODY MASS INDEX: 29.95 KG/M2 | HEIGHT: 73 IN | DIASTOLIC BLOOD PRESSURE: 59 MMHG | SYSTOLIC BLOOD PRESSURE: 111 MMHG

## 2023-01-26 DIAGNOSIS — E11.51 DM (DIABETES MELLITUS) TYPE II, CONTROLLED, WITH PERIPHERAL VASCULAR DISORDER (HCC): ICD-10-CM

## 2023-01-26 DIAGNOSIS — I25.5 ISCHEMIC CARDIOMYOPATHY: ICD-10-CM

## 2023-01-26 DIAGNOSIS — E11.42 DIABETIC POLYNEUROPATHY ASSOCIATED WITH TYPE 2 DIABETES MELLITUS (HCC): ICD-10-CM

## 2023-01-26 DIAGNOSIS — R53.81 PHYSICAL DEBILITY: ICD-10-CM

## 2023-01-26 DIAGNOSIS — I25.10 CORONARY ARTERY DISEASE INVOLVING NATIVE CORONARY ARTERY OF NATIVE HEART WITHOUT ANGINA PECTORIS: ICD-10-CM

## 2023-01-26 DIAGNOSIS — I73.9 PAD (PERIPHERAL ARTERY DISEASE) (HCC): ICD-10-CM

## 2023-01-26 DIAGNOSIS — I10 PRIMARY HYPERTENSION: Primary | ICD-10-CM

## 2023-01-26 DIAGNOSIS — Z95.810 ICD (IMPLANTABLE CARDIOVERTER-DEFIBRILLATOR) IN PLACE: ICD-10-CM

## 2023-01-26 DIAGNOSIS — L97.422 NON-PRESSURE CHRONIC ULCER OF LEFT HEEL AND MIDFOOT WITH FAT LAYER EXPOSED (HCC): Primary | ICD-10-CM

## 2023-01-26 DIAGNOSIS — Z79.02 ANTIPLATELET OR ANTITHROMBOTIC LONG-TERM USE: ICD-10-CM

## 2023-01-26 DIAGNOSIS — I70.213 ATHEROSCLEROSIS OF NATIVE ARTERY OF BOTH LOWER EXTREMITIES WITH INTERMITTENT CLAUDICATION (HCC): ICD-10-CM

## 2023-01-26 DIAGNOSIS — I70.90: ICD-10-CM

## 2023-01-26 PROCEDURE — 1111F DSCHRG MED/CURRENT MED MERGE: CPT | Performed by: INTERNAL MEDICINE

## 2023-01-26 PROCEDURE — 3078F DIAST BP <80 MM HG: CPT | Performed by: INTERNAL MEDICINE

## 2023-01-26 PROCEDURE — 3074F SYST BP LT 130 MM HG: CPT | Performed by: INTERNAL MEDICINE

## 2023-01-26 PROCEDURE — 99214 OFFICE O/P EST MOD 30 MIN: CPT | Performed by: INTERNAL MEDICINE

## 2023-01-26 PROCEDURE — 15271 SKIN SUB GRAFT TRNK/ARM/LEG: CPT

## 2023-01-26 PROCEDURE — G8427 DOCREV CUR MEDS BY ELIG CLIN: HCPCS | Performed by: INTERNAL MEDICINE

## 2023-01-26 PROCEDURE — G8484 FLU IMMUNIZE NO ADMIN: HCPCS | Performed by: INTERNAL MEDICINE

## 2023-01-26 PROCEDURE — G8417 CALC BMI ABV UP PARAM F/U: HCPCS | Performed by: INTERNAL MEDICINE

## 2023-01-26 PROCEDURE — 4004F PT TOBACCO SCREEN RCVD TLK: CPT | Performed by: INTERNAL MEDICINE

## 2023-01-26 PROCEDURE — 1123F ACP DISCUSS/DSCN MKR DOCD: CPT | Performed by: INTERNAL MEDICINE

## 2023-01-26 RX ORDER — DORZOLAMIDE HYDROCHLORIDE AND TIMOLOL MALEATE 20; 5 MG/ML; MG/ML
SOLUTION/ DROPS OPHTHALMIC
COMMUNITY
Start: 2023-01-16

## 2023-01-26 ASSESSMENT — ENCOUNTER SYMPTOMS
COLOR CHANGE: 0
BOWEL INCONTINENCE: 0
HOARSE VOICE: 0
ABDOMINAL PAIN: 0
HEMATEMESIS: 0
ORTHOPNEA: 0
SHORTNESS OF BREATH: 0
CHEST TIGHTNESS: 0
SPUTUM PRODUCTION: 0
BLURRED VISION: 0
HEMATOCHEZIA: 0
WHEEZING: 0
DIARRHEA: 0

## 2023-01-26 ASSESSMENT — PAIN SCALES - GENERAL: PAINLEVEL_OUTOF10: 0

## 2023-01-26 NOTE — DISCHARGE INSTRUCTIONS
Discharge Instructions for  Windy Fairchild  [address] [facility] [address]  [place] [address], [address]  Phone [phone]   Fax [phone]      NAME:  Jet Cardona  YOB: 1945  MEDICAL RECORD NUMBER:  [id number]  DATE:  @ED@    Return Appointment:   1 week with Caleb Ward MD      Instructions: left heel:  Applied puraply #4 today ( lot#: [id number]. 1.1j exp date: 11/16/2024)  Secured with mepilex transfer, abd pad and rolled gauze  Leave in place for one week    Plan for puraply #5 at next visit        Should you experience increased redness, swelling, pain, foul odor, size of wound(s), or have a temperature over 101 degrees please contact the [address] [place] [address] Road at [phone] or if after hours contact your primary care physician or go to the hospital emergency department. PLEASE NOTE: IF YOU ARE UNABLE TO OBTAIN WOUND SUPPLIES, CONTINUE TO USE THE SUPPLIES YOU HAVE AVAILABLE UNTIL YOU ARE ABLE TO REACH US. IT IS MOST IMPORTANT TO KEEP THE WOUND COVERED AT ALL TIMES.     Electronically signed Duane Darter, RN on 1/26/2023 at 3:55 PM

## 2023-01-26 NOTE — FLOWSHEET NOTE
01/26/23 1542   Wound 09/09/22 Heel Left;Lateral   Date First Assessed/Time First Assessed: 09/09/22 1535   Present on Hospital Admission: Yes  Primary Wound Type: Diabetic Ulcer  Location: Heel  Wound Location Orientation: Left;Lateral   Wound Image    Wound Etiology Diabetic Martinez 2   Dressing Status Clean;Dry; Intact   Wound Cleansed Cleansed with saline   Dressing/Treatment Foam   Wound Length (cm) 0.5 cm   Wound Width (cm) 1 cm   Wound Depth (cm) 0.1 cm   Wound Surface Area (cm^2) 0.5 cm^2   Change in Wound Size % (l*w) 91.67   Wound Volume (cm^3) 0.05 cm^3   Wound Healing % 98   Wound Assessment Granulation tissue   Drainage Amount Moderate   Drainage Description Serosanguinous   Odor None   Joanna-wound Assessment Intact   Wound Thickness Description not for Pressure Injury Full thickness

## 2023-01-26 NOTE — PROGRESS NOTES
[facility] CARDIOLOGY  [address], [address] [facility], [address] [place] [address]  PHONE: [phone]        23        NAME:  Vicente Menendez  : 1945  MRN: [id number]       SUBJECTIVE:   Vicente Menendez is a 65 y.o. male seen for a follow up visit regarding the following: The patient has a hx of VT,PAF,CAD,PAD,Ischemic cardiomyopathy with ICD,and COPD. He returns after prolonged debilitation from back surgery and foot ulcer. He reports slow improvement. Chief Complaint   Patient presents with    Coronary Artery Disease     6 month follow up       HPI:    Coronary Artery Disease  Presents for follow-up visit. Symptoms include muscle weakness. Pertinent negatives include no chest pain, chest pressure, chest tightness, dizziness, leg swelling, palpitations, shortness of breath or weight gain. The symptoms have been stable. Compliance with diet is good. Compliance with exercise is poor. Compliance with medications is good. Past Medical History, Past Surgical History, Family history, Social History, and Medications were all reviewed with the patient today and updated as necessary. Current Outpatient Medications:     dorzolamide-timolol (COSOPT) 22.3-6.8 MG/ML ophthalmic solution, , Disp: , Rfl:     montelukast (SINGULAIR) 10 MG tablet, TAKE 1 TABLET BY MOUTH EVERY DAY AT BEDTIME, Disp: 90 tablet, Rfl: 3    magnesium oxide (MAG-OX) 400 (240 Mg) MG tablet, TAKE 1 TABLET BY MOUTH EVERY DAY, Disp: 90 tablet, Rfl: 3    albuterol (PROVENTIL) (2.5 MG/3ML) 0.083% nebulizer solution, 1 vial via nebulizer 4 times daily if needed for shortness of breath or wheezing. Diagnosis-COPD, J44.9.   Bill to Medicare part B, Disp: 360 mL, Rfl: 11    carvedilol (COREG) 25 MG tablet, Take 1 tablet by mouth 2 times daily (with meals), Disp: 60 tablet, Rfl: 11    senna-docusate (PERICOLACE) 8.6-50 MG per tablet, Take 1 tablet by mouth nightly, Disp: , Rfl:     valsartan (DIOVAN) 80 MG tablet, Take 1 tablet by mouth daily, Disp: 30 tablet, Rfl: 5    CPAP Machine MISC, by Does not apply route, Disp: , Rfl:     albuterol sulfate HFA (PROVENTIL;VENTOLIN;PROAIR) 108 (90 Base) MCG/ACT inhaler, USE 2 PUFFS BY INHALATION 4 TIMES DAILY AS NEEDED FOR WHEEZING OR SHORTNESS OF BREATH. Patient prefers ventolin. , Disp: 18 g, Rfl: 11    GUAIFENESIN 1200 PO, Take 1 tablet by mouth every 12 hours as needed, Disp: , Rfl:     acetaminophen (TYLENOL) 500 MG tablet, Take 500 mg by mouth every 6 hours as needed for Pain Taking 1/2 two times daily, Disp: , Rfl:     glipiZIDE (GLUCOTROL XL) 10 MG extended release tablet, Take 1 tablet by mouth daily, Disp: 30 tablet, Rfl: 0    furosemide (LASIX) 20 MG tablet, Take 1 tablet by mouth in the morning.  (Patient taking differently: Take 20 mg by mouth daily 1/2 tablet), Disp: 60 tablet, Rfl: 3    rosuvastatin (CRESTOR) 10 MG tablet, TAKE 1 TABLET BY MOUTH EVERY DAY (Patient taking differently: at bedtime), Disp: 90 tablet, Rfl: 0    fluticasone-salmeterol (ADVAIR) 250-50 MCG/ACT AEPB diskus inhaler, Inhale 1 puff into the lungs in the morning and at bedtime, Disp: , Rfl:     amiodarone (CORDARONE) 200 MG tablet, Take 200 mg by mouth daily, Disp: , Rfl:     apixaban (ELIQUIS) 5 MG TABS tablet, Take 5 mg by mouth every 12 hours, Disp: , Rfl:     ascorbic acid (VITAMIN C) 500 MG tablet, Take 500 mg by mouth, Disp: , Rfl:     Cholecalciferol 50 MCG (2000 UT) TABS, Take 2,000 Units by mouth, Disp: , Rfl:     clopidogrel (PLAVIX) 75 MG tablet, Take 75 mg by mouth daily, Disp: , Rfl:     fluticasone (FLONASE) 50 MCG/ACT nasal spray, USE 1 OR 2 SPRAYS IN EACH NOSTRIL EVERY DAY., Disp: , Rfl:     latanoprost (XALATAN) 0.005 % ophthalmic solution, Apply 1 drop to eye nightly, Disp: , Rfl:     nitroGLYCERIN (NITROSTAT) 0.4 MG SL tablet, Place 0.4 mg under the tongue, Disp: , Rfl:     tamsulosin (FLOMAX) 0.4 MG capsule, Take 0.4 mg by mouth daily (Patient not taking: Reported on 1/26/2023), Disp: , Rfl:     traMADol (ULTRAM) 50 MG tablet, Take 50 mg by mouth every 6 hours as needed for Pain.  Taking 1/2 every morning (Patient not taking: Reported on 1/26/2023), Disp: , Rfl:   Allergies   Allergen Reactions    Acetaminophen Hives     Per spouse has small amount of hives, takes 1/2 and tolerates     Azithromycin Hives    Morphine Hallucinations     Muscle twitching. jerking    Oxaprozin Other (See Comments)     ELEVATED BLOOD PRESSURE    Oxycodone Anxiety     Past Medical History:   Diagnosis Date    Acute respiratory failure with hypoxia ([address] 10/23/2014    MINI (acute kidney injury) ([address] 09/15/2014    MIIN (acute kidney injury) ([address]     MINI (acute kidney injury) ([address]     Allergic rhinitis 11/10/2015    Asthma 11/10/2015    Benign essential hypertension 11/10/2015    Benign neoplasm of colon 11/10/2015    CAD (coronary artery disease) 11/10/2015    CAD (coronary artery disease) 1998, 1999    mix2, 3 stents [id number]    CAP (community acquired pneumonia) 12/31/2020    Cardiomyopathy ([address] 25/20/6402    Complicated wound infection 11/10/2015    COPD (chronic obstructive pulmonary disease) with emphysema ([address] 11/10/2015    COPD exacerbation ([address] 10/12/2011    COVID-19 12/31/2020    Diabetes mellitus type 2, controlled ([address] 11/10/2015    doesn/t check daily oral meds, A1c 6.0 (8/10/22)    Diabetic neuropathy ([address] 11/10/2015    Disruption of wound, unspecified 10/09/2014    Encounter for long-term (current) use of other high-risk medications 11/10/2015    Extremity atherosclerosis with intermittent claudication ([address] 11/10/2015    H/O endarterectomy 11/10/2015    Hiatal hernia 11/10/2015    History of 2019 novel coronavirus disease (COVID-19)     12/31/2020- hospitalized    Hyperglycemia due to type 1 diabetes mellitus ([address] 11/10/2015    Hyperlipidemia 11/10/2015    ICD (implantable cardioverter-defibrillator) in place 06/16/2022    Monomorphic ventricular tachycardia 12/31/2020    Myocardial infarction [facility]) 1999    Myocardial infarction (HCC) 11/10/2015    Obesity 11/10/2015    Orthostatic hypotension 11/10/2015    Osteoarthritis 11/10/2015    Peripheral vascular disease (HCC) 11/10/2015    PNA (pneumonia) 02/08/2019    PVC (premature ventricular contraction)     Rash 11/10/2014    Seroma complicating a procedure 10/02/2014    Sleep apnea     cpap    Toe laceration     Type 2 diabetes with nephropathy ([facility])     Uncontrolled type 2 diabetes mellitus 11/10/2015    Vertiginous syndromes and other disorders of vestibular system 11/10/2015     Past Surgical History:   Procedure Laterality Date    CARDIAC CATHETERIZATION  12/05/2018    LV EF=40%.LM:nl.LAD:30-40% mid stenosis.LCX OM1:95% stenosis.RCA:100% occlusion at RV branch.    CATARACT REMOVAL Right 07/20/2022    CORONARY ANGIOPLASTY WITH STENT PLACEMENT  12/05/2018    LCX OM1:2.5 x 12 [place] RAKESH    CORONARY ANGIOPLASTY WITH STENT PLACEMENT  1999    HI UNLISTED PROCEDURE ABDOMEN PERITONEUM & OMENTUM  1960    abdominal cyst removed    HI UNLISTED PROCEDURE CARDIAC SURGERY  1999    stents x3    SPINE SURGERY N/A 07/19/2022    LUMBAR 2, LUMBAR 4 KYPHOPLASTY AND ANY OTHER INDICATED LEVELS performed by MYRIAM Condon III, MD at [facility] MAIN OR    VASCULAR SURGERY  11/5/14    Repair of right common femoral artery     VASCULAR SURGERY  9/11/14    tiffanie femoral endarterectomy    VASCULAR SURGERY Left 10/28/2022    LEFT LOWER EXTREMITY ARTERIOGRAM / ULTRASOUND GUIDED ACCESS   performed by Sixto Alvarez MD at [facility] MAIN OR     Family History   Problem Relation Age of Onset    Breast Cancer Mother     Hypertension Mother     Other Father         DIVERTICULITIS    Crohn's Disease Sister     Lung Disease Brother         mesothioloma    Diabetes Sister     Heart Attack Father     Heart Disease Father     Stroke Mother       Social History     Tobacco Use    Smoking status: Former     Packs/day: 1.00     Years: 50.00     Pack years: 50.00     Types: Cigarettes     Quit date: 10/2/2011     Years since quitting:  11.3    Smokeless tobacco: Current     Types: Chew    Tobacco comments:     Chews tobacco daily   Substance Use Topics    Alcohol use: Yes     Alcohol/week: 0.0 standard drinks       ROS:    Review of Systems   Constitutional: Negative for chills, decreased appetite, diaphoresis, fever, malaise/fatigue and weight gain. HENT:  Negative for congestion, hearing loss, hoarse voice and nosebleeds. Eyes:  Negative for blurred vision. Cardiovascular:  Negative for chest pain, claudication, cyanosis, dyspnea on exertion, irregular heartbeat, leg swelling, near-syncope, orthopnea, palpitations, paroxysmal nocturnal dyspnea and syncope. Respiratory:  Negative for chest tightness, shortness of breath, sputum production and wheezing. Endocrine: Negative for polydipsia, polyphagia and polyuria. Skin:  Negative for color change. Musculoskeletal:  Positive for muscle weakness. Gastrointestinal:  Negative for abdominal pain, bowel incontinence, diarrhea, hematemesis and hematochezia. Genitourinary:  Negative for dysuria, frequency and hematuria. Neurological:  Negative for dizziness, focal weakness, light-headedness, loss of balance, numbness, sensory change and weakness. Psychiatric/Behavioral:  Negative for altered mental status and memory loss. PHYSICAL EXAM:   BP (!) 111/59   Pulse 60   Ht 6' 1\" (1.854 m)   Wt 226 lb (102.5 kg)   BMI 29.82 kg/m²      Physical Exam  Constitutional:       Appearance: Normal appearance. HENT:      Head: Normocephalic and atraumatic. Nose: Nose normal.   Eyes:      Extraocular Movements: Extraocular movements intact. Pupils: Pupils are equal, round, and reactive to light. Neck:      Vascular: No carotid bruit. Cardiovascular:      Rate and Rhythm: Regular rhythm. Pulses: Normal pulses. Heart sounds: No murmur heard. Pulmonary:      Effort: Pulmonary effort is normal.      Breath sounds: Normal breath sounds.       Comments: Bilateral coarse bs  Abdominal:      General: Abdomen is flat. Bowel sounds are normal.      Palpations: Abdomen is soft. Musculoskeletal:         General: Normal range of motion. Cervical back: Normal range of motion and neck supple. Skin:     General: Skin is warm and dry. Neurological:      General: No focal deficit present. Mental Status: He is alert and oriented to person, place, and time. Psychiatric:         Mood and Affect: Mood normal.       Medical problems and test results were reviewed with the patient today. Recent Results (from the past 672 hour(s))   POCT Glucose    Collection Time: 12/29/22  1:41 PM   Result Value Ref Range    POC Glucose 106 (H) 65 - 100 mg/dL    Performed by: Jose Luis    Arterial Blood Gas, POC    Collection Time: 12/29/22  3:22 PM   Result Value Ref Range    DEVICE NASAL CANNULA      pH, Arterial, POC 7.44 7.35 - 7.45      pCO2, Arterial, POC 46.4 (H) 35 - 45 MMHG    pO2, Arterial, POC 70 (L) 75 - 100 MMHG    HCO3, Mixed 31.3 (H) 22 - 26 MMOL/L    SO2c, Arterial, POC 94.1 (L) 95 - 98 %    Base Excess 6.2 mmol/L    POC Bill's Test Positive      Site RIGHT RADIAL      Specimen type: ARTERIAL      Performed by: Usama     FIO2 2     POCT Glucose    Collection Time: 12/29/22  5:01 PM   Result Value Ref Range    POC Glucose 134 (H) 65 - 100 mg/dL    Performed by: Coral    Blood Culture 2    Collection Time: 12/29/22  6:31 PM    Specimen: Blood   Result Value Ref Range    Special Requests LEFT  Antecubital        Gram stain GRAM POSITIVE COCCI      Gram stain AEROBIC BOTTLE POSITIVE      Gram stain        CRITICAL RESULT NOT CALLED DUE TO PREVIOUS NOTIFICATION OF CRITICAL RESULT WITHIN THE LAST 24 HOURS. Culture (A)       STAPHYLOCOCCUS HOMINIS This organism may be indicative of culture contamination. Clinical correlation needs to be evaluated as each case is unique.        Susceptibility    Staphylococcus hominis - BACTERIAL SUSCEPTIBILITY PANEL GIOVANNY     tetracycline* >8 Resistant ug/mL      * Organisms that are susceptible to tetracycline are also considered susceptible to doxycycline and minocycline. however,some organisms that are intermediate or resistant to tetracycline may be susceptible to doxycycline and minocycline. clindamycin 2 Resistant ug/mL     vancomycin 2 Sensitive ug/mL     minocycline <=1 Sensitive ug/mL     oxacillin >1 Resistant ug/mL     rifampin* <=0.5 Sensitive ug/mL      * Rifampin is not to be used for mono-therapy.    Ammonia    Collection Time: 12/29/22  6:31 PM   Result Value Ref Range    Ammonia <10 (L) 11 - 32 UMOL/L   POCT Glucose    Collection Time: 12/29/22  9:22 PM   Result Value Ref Range    POC Glucose 203 (H) 65 - 100 mg/dL    Performed by: Joshua    CBC with Auto Differential    Collection Time: 12/30/22  5:22 AM   Result Value Ref Range    WBC 5.1 4.3 - 11.1 K/uL    RBC 3.43 (L) 4.23 - 5.6 M/uL    Hemoglobin 10.6 (L) 13.6 - 17.2 g/dL    Hematocrit 31.9 (L) 41.1 - 50.3 %    MCV 93.0 82 - 102 FL    MCH 30.9 26.1 - 32.9 PG    MCHC 33.2 31.4 - 35.0 g/dL    RDW 15.9 (H) 11.9 - 14.6 %    Platelets 281 170 - 317 K/uL    MPV 9.8 9.4 - 12.3 FL    nRBC 0.00 0.0 - 0.2 K/uL    Differential Type AUTOMATED      Seg Neutrophils 83 (H) 43 - 78 %    Lymphocytes 8 (L) 13 - 44 %    Monocytes 8 4.0 - 12.0 %    Eosinophils % 0 (L) 0.5 - 7.8 %    Basophils 0 0.0 - 2.0 %    Immature Granulocytes 1 0.0 - 5.0 %    Segs Absolute 4.2 1.7 - 8.2 K/UL    Absolute Lymph # 0.4 (L) 0.5 - 4.6 K/UL    Absolute Mono # 0.4 0.1 - 1.3 K/UL    Absolute Eos # 0.0 0.0 - 0.8 K/UL    Basophils Absolute 0.0 0.0 - 0.2 K/UL    Absolute Immature Granulocyte 0.1 0.0 - 0.5 K/UL   Basic Metabolic Panel    Collection Time: 12/30/22  5:22 AM   Result Value Ref Range    Sodium 139 133 - 143 mmol/L    Potassium 3.6 3.5 - 5.1 mmol/L    Chloride 100 (L) 101 - 110 mmol/L    CO2 34 (H) 21 - 32 mmol/L    Anion Gap 5 2 - 11 mmol/L    Glucose 193 (H) 65 - 100 mg/dL    BUN 15 8 - 23 MG/DL    Creatinine 0.80 0.8 - 1.5 MG/DL    Est, Glom Filt Rate >60 >60 ml/min/1.73m2    Calcium 8.9 8.3 - 10.4 MG/DL   POCT Glucose    Collection Time: 12/30/22  7:18 AM   Result Value Ref Range    POC Glucose 198 (H) 65 - 100 mg/dL    Performed by: Mone    POCT Glucose    Collection Time: 12/30/22 11:03 AM   Result Value Ref Range    POC Glucose 273 (H) 65 - 100 mg/dL    Performed by: Sandra Nicholson    Transthoracic echocardiogram (TTE) complete with contrast, bubble, strain, and 3D PRN    Collection Time: 12/30/22  3:30 PM   Result Value Ref Range    LV EDV A2C 139 mL    LV EDV A4C 194 mL    LV ESV A2C 76 mL    LV ESV A4C 93 mL    IVSd 1.1 (A) 0.6 - 1.0 cm    LVIDd 6.0 (A) 4.2 - 5.9 cm    LVIDs 4.5 cm    LVOT Diameter 2.4 cm    LVOT Mean Gradient 2 mmHg    LVOT VTI 20.3 cm    LVOT Peak Velocity 1.0 m/s    LVOT Peak Gradient 4 mmHg    LVPWd 1.1 (A) 0.6 - 1.0 cm    LV E' Lateral Velocity 10 cm/s    LV E' Septal Velocity 7 cm/s    LV Ejection Fraction A2C 45 %    LV Ejection Fraction A4C 52 %    EF BP 49 (A) 55 - 100 %    LVOT Area 4.5 cm2    LVOT SV 91.8 ml    LA Minor Axis 6.1 cm    LA Major [place] 6.3 cm    LA Area 2C 25.1 cm2    LA Area 4C 24.4 cm2    LA Volume 2C 83 (A) 18 - 58 mL    LA Volume 4C 80 (A) 18 - 58 mL    LA Volume BP 82 (A) 18 - 58 mL    LA Diameter 4.6 cm    AV Mean Velocity 1.1 m/s    AV Mean Gradient 6 mmHg    AV VTI 36.4 cm    AV Peak Velocity 1.7 m/s    AV Peak Gradient 12 mmHg    AV Area by VTI 2.5 cm2    AV Area by Peak Velocity 2.5 cm2    Aortic Root 3.5 cm    Ascending Aorta 3.5 cm    IVC Proxmal 2.7 cm    MV E Wave Deceleration Time 218.0 ms    MV A Velocity 1.14 m/s    MV E Velocity 1.07 m/s    MV Mean Gradient 2 mmHg    MV VTI 41.8 cm    MV Mean Velocity 0.7 m/s    MV Max Velocity 1.2 m/s    MV Peak Gradient 6 mmHg    MV Area by VTI 2.2 cm2    RV Basal Dimension 4.8 cm    RV Mid Dimension 3.3 cm    RV Free Wall Peak S' 18 cm/s    TAPSE 3.0 1.7 cm    Body Surface Area 2.29 m2    Fractional Shortening 2D 25 28 - 44 %    LV ESV Index A4C 41 mL/m2    LV EDV Index A4C 86 mL/m2    LV ESV Index A2C 34 mL/m2    LV EDV Index A2C 62 mL/m2    LVIDd Index 2.65 cm/m2    LVIDs Index 1.99 cm/m2    LV RWT Ratio 0.37     LV Mass 2D 279.6 (A) 88 - 224 g    LV Mass 2D Index 123.7 (A) 49 - 115 g/m2    MV E/A 0.94     E/E' Ratio (Averaged) 12.99     E/E' Lateral 10.70     E/E' Septal 15.29     LA Volume Index BP 36 (A) 16 - 34 ml/m2    LVOT Stroke Volume Index 40.6 mL/m2    LA Volume Index 2C 37 (A) 16 - 34 mL/m2    LA Volume Index 4C 35 (A) 16 - 34 mL/m2    LA Size Index 2.04 cm/m2    LA/AO Root Ratio 1.31     Ao Root Index 1.55 cm/m2    Ascending Aorta Index 1.55 cm/m2    AV Velocity Ratio 0.59     LVOT:AV VTI Index 0.56     CORI/BSA VTI 1.1 cm2/m2    CORI/BSA Peak Velocity 1.1 cm2/m2    MV:LVOT VTI Index 2.06     Left Ventricular Ejection Fraction 53     LVEF MODALITY ECHO    Culture, Blood 1    Collection Time: 12/30/22  4:03 PM    Specimen: Blood   Result Value Ref Range    Special Requests RIGHT  HAND        Culture NO GROWTH 5 DAYS     POCT Glucose    Collection Time: 12/30/22  4:34 PM   Result Value Ref Range    POC Glucose 266 (H) 65 - 100 mg/dL    Performed by: Mone    POCT Glucose    Collection Time: 12/30/22  8:23 PM   Result Value Ref Range    POC Glucose 305 (H) 65 - 100 mg/dL    Performed by: KelliPCGURINDER    CBC with Auto Differential    Collection Time: 12/31/22  3:47 AM   Result Value Ref Range    WBC 4.7 4.3 - 11.1 K/uL    RBC 3.23 (L) 4.23 - 5.6 M/uL    Hemoglobin 9.9 (L) 13.6 - 17.2 g/dL    Hematocrit 29.9 (L) 41.1 - 50.3 %    MCV 92.6 82 - 102 FL    MCH 30.7 26.1 - 32.9 PG    MCHC 33.1 31.4 - 35.0 g/dL    RDW 15.5 (H) 11.9 - 14.6 %    Platelets 370 090 - 649 K/uL    MPV 11.1 9.4 - 12.3 FL    nRBC 0.00 0.0 - 0.2 K/uL    Differential Type AUTOMATED      Seg Neutrophils 79 (H) 43 - 78 %    Lymphocytes 10 (L) 13 - 44 %    Monocytes 10 4.0 - 12.0 % Eosinophils % 0 (L) 0.5 - 7.8 %    Basophils 0 0.0 - 2.0 %    Immature Granulocytes 1 0.0 - 5.0 %    Segs Absolute 3.7 1.7 - 8.2 K/UL    Absolute Lymph # 0.5 0.5 - 4.6 K/UL    Absolute Mono # 0.5 0.1 - 1.3 K/UL    Absolute Eos # 0.0 0.0 - 0.8 K/UL    Basophils Absolute 0.0 0.0 - 0.2 K/UL    Absolute Immature Granulocyte 0.1 0.0 - 0.5 K/UL   Basic Metabolic Panel    Collection Time: 12/31/22  3:47 AM   Result Value Ref Range    Sodium 138 133 - 143 mmol/L    Potassium 3.2 (L) 3.5 - 5.1 mmol/L    Chloride 100 (L) 101 - 110 mmol/L    CO2 32 21 - 32 mmol/L    Anion Gap 6 2 - 11 mmol/L    Glucose 245 (H) 65 - 100 mg/dL    BUN 19 8 - 23 MG/DL    Creatinine 1.00 0.8 - 1.5 MG/DL    Est, Glom Filt Rate >60 >60 ml/min/1.73m2    Calcium 8.6 8.3 - 10.4 MG/DL   POCT Glucose    Collection Time: 12/31/22  7:50 AM   Result Value Ref Range    POC Glucose 229 (H) 65 - 100 mg/dL    Performed by: StevenGURINDER    POCT Glucose    Collection Time: 12/31/22 11:21 AM   Result Value Ref Range    POC Glucose 342 (H) 65 - 100 mg/dL    Performed by: Sulma    POCT Glucose    Collection Time: 12/31/22  4:12 PM   Result Value Ref Range    POC Glucose 354 (H) 65 - 100 mg/dL    Performed by: Sana    POCT Glucose    Collection Time: 12/31/22  8:33 PM   Result Value Ref Range    POC Glucose 306 (H) 65 - 100 mg/dL    Performed by: Saul    CBC with Auto Differential    Collection Time: 01/01/23  4:22 AM   Result Value Ref Range    WBC 5.4 4.3 - 11.1 K/uL    RBC 3.34 (L) 4.23 - 5.6 M/uL    Hemoglobin 10.0 (L) 13.6 - 17.2 g/dL    Hematocrit 30.6 (L) 41.1 - 50.3 %    MCV 91.6 82 - 102 FL    MCH 29.9 26.1 - 32.9 PG    MCHC 32.7 31.4 - 35.0 g/dL    RDW 15.4 (H) 11.9 - 14.6 %    Platelets 497 236 - 813 K/uL    MPV 11.2 9.4 - 12.3 FL    nRBC 0.00 0.0 - 0.2 K/uL    Seg Neutrophils 79 (H) 43 - 78 %    Lymphocytes 13 13 - 44 %    Monocytes 7 4.0 - 12.0 %    Eosinophils % 0 (L) 0.5 - 7.8 %    Basophils 0 0.0 - 2.0 % Immature Granulocytes 1 0.0 - 5.0 %    Segs Absolute 4.2 1.7 - 8.2 K/UL    Absolute Lymph # 0.7 0.5 - 4.6 K/UL    Absolute Mono # 0.4 0.1 - 1.3 K/UL    Absolute Eos # 0.0 0.0 - 0.8 K/UL    Basophils Absolute 0.0 0.0 - 0.2 K/UL    Absolute Immature Granulocyte 0.1 0.0 - 0.5 K/UL    RBC Comment SLIGHT  ANISOCYTOSIS + POIKILOCYTOSIS        RBC Comment OCCASIONAL  OVALOCYTES        WBC Comment Result Confirmed By Smear      Platelet Comment ADEQUATE      Differential Type AUTOMATED     Basic Metabolic Panel    Collection Time: 01/01/23  4:22 AM   Result Value Ref Range    Sodium 138 133 - 143 mmol/L    Potassium 3.6 3.5 - 5.1 mmol/L    Chloride 101 101 - 110 mmol/L    CO2 33 (H) 21 - 32 mmol/L    Anion Gap 4 2 - 11 mmol/L    Glucose 243 (H) 65 - 100 mg/dL    BUN 20 8 - 23 MG/DL    Creatinine 0.90 0.8 - 1.5 MG/DL    Est, Glom Filt Rate >60 >60 ml/min/1.73m2    Calcium 8.6 8.3 - 10.4 MG/DL   POCT Glucose    Collection Time: 01/01/23  8:17 AM   Result Value Ref Range    POC Glucose 235 (H) 65 - 100 mg/dL    Performed by: BriceTraditional MedicinalsCT    POCT Glucose    Collection Time: 01/01/23 11:27 AM   Result Value Ref Range    POC Glucose 322 (H) 65 - 100 mg/dL    Performed by: BriceTraditional MedicinalsCT    POCT Glucose    Collection Time: 01/01/23  4:08 PM   Result Value Ref Range    POC Glucose 342 (H) 65 - 100 mg/dL    Performed by: BriceChongqing Data Control Technology CoaPCT    POCT Glucose    Collection Time: 01/01/23  8:40 PM   Result Value Ref Range    POC Glucose 280 (H) 65 - 100 mg/dL    Performed by: Pino    POCT Glucose    Collection Time: 01/02/23  8:14 AM   Result Value Ref Range    POC Glucose 320 (H) 65 - 100 mg/dL    Performed by: BriceRevealr Software LimitedJune    Culture, Blood 1    Collection Time: 01/02/23 11:23 AM    Specimen: Blood   Result Value Ref Range    Special Requests RIGHT  Antecubital        Culture NO GROWTH 5 DAYS     Culture, Blood 1    Collection Time: 01/02/23 11:29 AM    Specimen: Blood   Result Value Ref Range Special Requests RIGHT  FOREARM        Culture NO GROWTH 5 DAYS     POCT Glucose    Collection Time: 01/02/23 12:28 PM   Result Value Ref Range    POC Glucose 327 (H) 65 - 100 mg/dL    Performed by: Shen    POCT Glucose    Collection Time: 01/02/23  4:19 PM   Result Value Ref Range    POC Glucose 335 (H) 65 - 100 mg/dL    Performed by: JosafataPCT    POCT Glucose    Collection Time: 01/02/23  9:02 PM   Result Value Ref Range    POC Glucose 314 (H) 65 - 100 mg/dL    Performed by: [address] [place] [address] PPD TEST IN 24 HRS    Collection Time: 01/03/23 12:00 AM   Result Value Ref Range    PPD, (POC) Negative Negative    mm Induration 0 0 - 5 mm   Basic Metabolic Panel w/ Reflex to MG    Collection Time: 01/03/23  4:12 AM   Result Value Ref Range    Sodium 138 133 - 143 mmol/L    Potassium 3.5 3.5 - 5.1 mmol/L    Chloride 102 101 - 110 mmol/L    CO2 35 (H) 21 - 32 mmol/L    Anion Gap 1 (L) 2 - 11 mmol/L    Glucose 181 (H) 65 - 100 mg/dL    BUN 22 8 - 23 MG/DL    Creatinine 0.90 0.8 - 1.5 MG/DL    Est, Glom Filt Rate >60 >60 ml/min/1.73m2    Calcium 8.3 8.3 - 10.4 MG/DL   Magnesium    Collection Time: 01/03/23  4:12 AM   Result Value Ref Range    Magnesium 2.0 1.8 - 2.4 mg/dL   POCT Glucose    Collection Time: 01/03/23  7:17 AM   Result Value Ref Range    POC Glucose 151 (H) 65 - 100 mg/dL    Performed by: PradeepganPCT    POCT Glucose    Collection Time: 01/03/23 11:30 AM   Result Value Ref Range    POC Glucose 204 (H) 65 - 100 mg/dL    Performed by:  JericarganPCT    POCT Glucose    Collection Time: 01/03/23  4:18 PM   Result Value Ref Range    POC Glucose 320 (H) 65 - 100 mg/dL    Performed by: Mark    POCT Glucose    Collection Time: 01/03/23  8:31 PM   Result Value Ref Range    POC Glucose 292 (H) 65 - 100 mg/dL    Performed by: NicholsonDestinyPCT    Vancomycin Level, Random    Collection Time: 01/04/23  6:00 AM   Result Value Ref Range    Vancomycin Rm 13.4 UG/ML   CBC    Collection Time: 01/04/23  6:00 AM   Result Value Ref Range    WBC 9.2 4.3 - 11.1 K/uL    RBC 3.26 (L) 4.23 - 5.6 M/uL    Hemoglobin 9.8 (L) 13.6 - 17.2 g/dL    Hematocrit 29.7 (L) 41.1 - 50.3 %    MCV 91.1 82 - 102 FL    MCH 30.1 26.1 - 32.9 PG    MCHC 33.0 31.4 - 35.0 g/dL    RDW 15.3 (H) 11.9 - 14.6 %    Platelets 916 323 - 590 K/uL    MPV 11.0 9.4 - 12.3 FL    nRBC 0.00 0.0 - 0.2 K/uL   Basic Metabolic Panel w/ Reflex to MG    Collection Time: 01/04/23  6:00 AM   Result Value Ref Range    Sodium 138 133 - 143 mmol/L    Potassium 4.1 3.5 - 5.1 mmol/L    Chloride 101 101 - 110 mmol/L    CO2 35 (H) 21 - 32 mmol/L    Anion Gap 2 2 - 11 mmol/L    Glucose 160 (H) 65 - 100 mg/dL    BUN 20 8 - 23 MG/DL    Creatinine 0.90 0.8 - 1.5 MG/DL    Est, Glom Filt Rate >60 >60 ml/min/1.73m2    Calcium 8.3 8.3 - 10.4 MG/DL   POCT Glucose    Collection Time: 01/04/23  7:36 AM   Result Value Ref Range    POC Glucose 161 (H) 65 - 100 mg/dL    Performed by: Tracie    PLEASE READ & DOCUMENT PPD TEST IN 48 HRS    Collection Time: 01/04/23 12:15 PM   Result Value Ref Range    PPD, (POC) Negative Negative    mm Induration 0 0 - 5 mm   POCT Glucose    Collection Time: 01/04/23 12:18 PM   Result Value Ref Range    POC Glucose 370 (H) 65 - 100 mg/dL    Performed by:  Tracie    POCT Glucose    Collection Time: 01/04/23  4:19 PM   Result Value Ref Range    POC Glucose 269 (H) 65 - 100 mg/dL    Performed by: Omkar Church    POCT Glucose    Collection Time: 01/04/23  8:28 PM   Result Value Ref Range    POC Glucose 232 (H) 65 - 100 mg/dL    Performed by: Saul    Basic Metabolic Panel w/ Reflex to MG    Collection Time: 01/05/23  4:33 AM   Result Value Ref Range    Sodium 138 133 - 143 mmol/L    Potassium 3.8 3.5 - 5.1 mmol/L    Chloride 102 101 - 110 mmol/L    CO2 34 (H) 21 - 32 mmol/L    Anion Gap 2 2 - 11 mmol/L    Glucose 143 (H) 65 - 100 mg/dL    BUN 18 8 - 23 MG/DL    Creatinine 0.80 0.8 - 1.5 MG/DL    Est, Glom Filt Rate >60 >60 ml/min/1.73m2    Calcium 8.2 (L) 8.3 - 10.4 MG/DL   POCT Glucose    Collection Time: 01/05/23  7:28 AM   Result Value Ref Range    POC Glucose 136 (H) 65 - 100 mg/dL    Performed by: Sulma    Transesophageal echocardiogram (DIRK) with contrast and 3D PRN    Collection Time: 01/05/23 12:25 PM   Result Value Ref Range    Body Surface Area 2.29 m2    Left Ventricular Ejection Fraction 58     LVEF MODALITY ECHO    POCT Glucose    Collection Time: 01/05/23  4:37 PM   Result Value Ref Range    POC Glucose 358 (H) 65 - 100 mg/dL    Performed by: Sulma    POCT Glucose    Collection Time: 01/05/23  8:37 PM   Result Value Ref Range    POC Glucose 230 (H) 65 - 100 mg/dL    Performed by: Andree    POCT Glucose    Collection Time: 01/06/23  7:27 AM   Result Value Ref Range    POC Glucose 148 (H) 65 - 100 mg/dL    Performed by: Chanel    POCT Glucose    Collection Time: 01/06/23 11:19 AM   Result Value Ref Range    POC Glucose 206 (H) 65 - 100 mg/dL    Performed by: Barrett    CK    Collection Time: 01/06/23 11:43 AM   Result Value Ref Range    Total CK 22 21 - 215 U/L   CBC with Auto Differential    Collection Time: 01/09/23  3:06 PM   Result Value Ref Range    WBC 7.3 4.3 - 11.1 K/uL    RBC 3.27 (L) 4.23 - 5.6 M/uL    Hemoglobin 10.0 (L) 13.6 - 17.2 g/dL    Hematocrit 30.4 %    MCV 93.0 82.0 - 102.0 FL    MCH 30.6 26.1 - 32.9 PG    MCHC 32.9 31.4 - 35.0 g/dL    RDW 16.1 (H) 11.9 - 14.6 %    Platelets 727 325 - 583 K/uL    MPV 10.2 9.4 - 12.3 FL    nRBC 0.00 0.0 - 0.2 K/uL    Seg Neutrophils 75 43 - 78 %    Lymphocytes 16 13 - 44 %    Monocytes 8 4.0 - 12.0 %    Eosinophils % 0 (L) 0.5 - 7.8 %    Basophils 0 0.0 - 2.0 %    Immature Granulocytes 1 0.0 - 5.0 %    Segs Absolute 5.5 1.7 - 8.2 K/UL    Absolute Lymph # 1.2 0.5 - 4.6 K/UL    Absolute Mono # 0.6 0.1 - 1.3 K/UL    Absolute Eos # 0.0 0.0 - 0.8 K/UL    Basophils Absolute 0.0 0.0 - 0.2 K/UL    Absolute Immature Granulocyte 0.1 0.0 - 0.5 K/UL    Differential Type AUTOMATED     Creatinine    Collection Time: 01/09/23  3:06 PM   Result Value Ref Range    Creatinine 1.10 0.8 - 1.5 MG/DL   Hepatic Function Panel    Collection Time: 01/09/23  3:06 PM   Result Value Ref Range    Total Protein 6.8 6.3 - 8.2 g/dL    Albumin 3.0 (L) 3.2 - 4.6 g/dL    Globulin 3.8 2.8 - 4.5 g/dL    Albumin/Globulin Ratio 0.8 0.4 - 1.6      Total Bilirubin 0.7 0.2 - 1.1 MG/DL    Bilirubin, Direct 0.2 <0.4 MG/DL    Alk Phosphatase 75 50 - 136 U/L    AST 10 (L) 15 - 37 U/L    ALT 32 12 - 65 U/L   CK    Collection Time: 01/09/23  3:06 PM   Result Value Ref Range    Total CK 17 (L) 21 - 215 U/L   Comprehensive Metabolic Panel    Collection Time: 01/12/23  8:37 AM   Result Value Ref Range    Sodium 141 133 - 143 mmol/L    Potassium 4.0 3.5 - 5.1 mmol/L    Chloride 106 101 - 110 mmol/L    CO2 28 21 - 32 mmol/L    Anion Gap 7 2 - 11 mmol/L    Glucose 118 (H) 65 - 100 mg/dL    BUN 9 8 - 23 MG/DL    Creatinine 0.90 0.8 - 1.5 MG/DL    Est, Glom Filt Rate >60 >60 ml/min/1.73m2    Calcium 8.9 8.3 - 10.4 MG/DL    Total Bilirubin 0.7 0.2 - 1.1 MG/DL    ALT 26 12 - 65 U/L    AST 10 (L) 15 - 37 U/L    Alk Phosphatase 78 50 - 136 U/L    Total Protein 6.5 6.3 - 8.2 g/dL    Albumin 3.1 (L) 3.2 - 4.6 g/dL    Globulin 3.4 2.8 - 4.5 g/dL    Albumin/Globulin Ratio 0.9 0.4 - 1.6     CBC with Auto Differential    Collection Time: 01/12/23  8:37 AM   Result Value Ref Range    WBC 6.9 4.3 - 11.1 K/uL    RBC 3.12 (L) 4.23 - 5.6 M/uL    Hemoglobin 9.6 (L) 13.6 - 17.2 g/dL    Hematocrit 30.0 (L) 41.1 - 50.3 %    MCV 96.2 82 - 102 FL    MCH 30.8 26.1 - 32.9 PG    MCHC 32.0 31.4 - 35.0 g/dL    RDW 16.4 (H) 11.9 - 14.6 %    Platelets 174 577 - 931 K/uL    MPV 10.3 9.4 - 12.3 FL    nRBC 0.00 0.0 - 0.2 K/uL    Differential Type AUTOMATED      Seg Neutrophils 75 43 - 78 %    Lymphocytes 17 13 - 44 %    Monocytes 7 4.0 - 12.0 %    Eosinophils % 0 (L) 0.5 - 7.8 % Basophils 0 0.0 - 2.0 %    Immature Granulocytes 1 0.0 - 5.0 %    Segs Absolute 5.1 1.7 - 8.2 K/UL    Absolute Lymph # 1.2 0.5 - 4.6 K/UL    Absolute Mono # 0.5 0.1 - 1.3 K/UL    Absolute Eos # 0.0 0.0 - 0.8 K/UL    Basophils Absolute 0.0 0.0 - 0.2 K/UL    Absolute Immature Granulocyte 0.1 0.0 - 0.5 K/UL   CBC with Auto Differential    Collection Time: 01/16/23  3:20 PM   Result Value Ref Range    WBC 5.7 4.3 - 11.1 K/uL    RBC 3.26 (L) 4.23 - 5.6 M/uL    Hemoglobin 10.0 (L) 13.6 - 17.2 g/dL    Hematocrit 30.9 %    MCV 94.8 82.0 - 102.0 FL    MCH 30.7 26.1 - 32.9 PG    MCHC 32.4 31.4 - 35.0 g/dL    RDW 17.1 (H) 11.9 - 14.6 %    Platelets 544 650 - 967 K/uL    MPV 10.4 9.4 - 12.3 FL    nRBC 0.00 0.0 - 0.2 K/uL    Differential Type AUTOMATED      Seg Neutrophils 68 43 - 78 %    Lymphocytes 24 13 - 44 %    Monocytes 7 4.0 - 12.0 %    Eosinophils % 0 (L) 0.5 - 7.8 %    Basophils 0 0.0 - 2.0 %    Immature Granulocytes 1 0.0 - 5.0 %    Segs Absolute 3.9 1.7 - 8.2 K/UL    Absolute Lymph # 1.4 0.5 - 4.6 K/UL    Absolute Mono # 0.4 0.1 - 1.3 K/UL    Absolute Eos # 0.0 0.0 - 0.8 K/UL    Basophils Absolute 0.0 0.0 - 0.2 K/UL    Absolute Immature Granulocyte 0.1 0.0 - 0.5 K/UL   Creatinine    Collection Time: 01/16/23  3:20 PM   Result Value Ref Range    Creatinine 1.20 0.8 - 1.5 MG/DL   Hepatic Function Panel    Collection Time: 01/16/23  3:20 PM   Result Value Ref Range    Total Protein 6.8 6.3 - 8.2 g/dL    Albumin 3.2 3.2 - 4.6 g/dL    Globulin 3.6 2.8 - 4.5 g/dL    Albumin/Globulin Ratio 0.9 0.4 - 1.6      Total Bilirubin 0.5 0.2 - 1.1 MG/DL    Bilirubin, Direct <0.1 <0.4 MG/DL    Alk Phosphatase 84 50 - 136 U/L    AST 13 (L) 15 - 37 U/L    ALT 25 12 - 65 U/L   CK    Collection Time: 01/16/23  3:20 PM   Result Value Ref Range    Total CK 25 21 - 215 U/L     Lab Results   Component Value Date/Time    CHOL 77 03/05/2022 06:14 AM    HDL 45 03/05/2022 06:14 AM    VLDL 18 09/21/2021 02:54 PM     No results found for any visits on 01/26/23. ASSESSMENT and PLAN    Radha Thomas was seen today for coronary artery disease. Diagnoses and all orders for this visit:    Primary hypertension:Stable. Continue Diovan and Coreg    Coronary artery disease involving native coronary artery of native heart without angina pectoris:Stable. Continue Plavix,Coreg,Diovan,and Crestor    Ischemic cardiomyopathy:Stable. Continue Diovan and Coreg    ICD (implantable cardioverter-defibrillator) in place:Device clinic as scheduled. PAF:Stable. Continue Amiodarone and Eliquis. Disposition:    Return in about 6 months (around 7/26/2023).                 Gabrielle Bergman MD  1/26/2023  1:20 PM

## 2023-01-26 NOTE — WOUND CARE
Discharge Instructions for  [address]  [address]  [place] [address], [address]  Phone [phone]   Fax [phone]      NAME:  Darryle Pickle OF BIRTH:  1945  MEDICAL RECORD NUMBER:  [id number]  DATE:  1/26/2023    Return Appointment:   1 week with Caleb Ward MD      Instructions: eft heel:  Applied puraply #4 today ( lot#: [id number]. 1.1j exp date: 11/16/2024)  Secured with mepilex transfer, abd pad and rolled gauze  Leave in place for one week    Plan for puraply #5 at next visit          Should you experience increased redness, swelling, pain, foul odor, size of wound(s), or have a temperature over 101 degrees please contact the [address] [place] [address] Road at [phone] or if after hours contact your primary care physician or go to the hospital emergency department. PLEASE NOTE: IF YOU ARE UNABLE TO OBTAIN WOUND SUPPLIES, CONTINUE TO USE THE SUPPLIES YOU HAVE AVAILABLE UNTIL YOU ARE ABLE TO REACH US. IT IS MOST IMPORTANT TO KEEP THE WOUND COVERED AT ALL TIMES.     Electronically signed Duane Darter, RN on 1/26/2023 at 3:57 PM

## 2023-01-26 NOTE — WOUND CARE
PuraPly AMTreatment Note    NAME:  Fredi Dunham  YOB: 1945  MEDICAL RECORD NUMBER:  [id number]  DATE:  1/26/2023    Goal:  Patient will receive safe and proper application of skin substitute. Patient will comply with caring for dressing, and reporting complications. Expiration date checked immediately prior to use. Package intact prior to use and no damage noted. Transport temperature controlled and acceptable. PuraPly AM was removed from protective sterile packaging by provider and applied to prepared ulcer bed. PuraPly AM was hydrated with sterile normal saline per provider. PuraPly AM was applied to wound and affixed with steri-strips by the provider. PuraPly AM was covered with non-adherent ulcer dressing. Applied abd pad over non-adherent. Applied dry gauze and/or roll gauze. Patient/caregiver was instructed not to remove dressing and to keep it clean and dry. Pt/family/caregiver was instructed on signs and symptoms of complications to report such as draining through dressing, dressing falling down/slipping, getting wet, or severe pain or tingling. PuraPly AM may be applied a total of 10 times per wound over a 12 week period. Date of first application of PuraPly for this current wound is December 8, 2022.    Guidelines followed    Electronically signed by Zahra Suarez RN on 1/26/2023 at 3:58 PM

## 2023-01-27 NOTE — PROGRESS NOTES
[address]  Consult Note/H&P/Progress Note      [address] RECORD NUMBER:  [id number]  AGE: 65 y.o. RACE White (non-)  GENDER: male  : 1945  EPISODE DATE:  2023       Subjective:      CC (Chief Complaint) and HISTORY of PRESENT ILLNESS (HPI):    Ronan Perez is a 65 y.o. male who presents today for wound/ulcer evaluation. He presents on 10/24/2022 with an ulcer wound on the left heel that has been present for several months. He was in his usual state of poor health when in July he had back surgery which led to a complicated abscess, prolonged hospital stay, and rehabilitation stay which ended just a few weeks ago. He is currently at home. He has significant debility. He is diabetic and has significant peripheral vascular disease with history of bilateral femoral endarterectomies. These were performed several years ago by Dr. Breanna Cole. ABIs were performed in July just prior to his surgery with the JOVANA on the left of 0.6 with plan for follow-up this coming January to monitor. He has been lying in bed without use of Rooke boots and has developed an ulcer on the left heel. He has a history of MRSA infection in his back and was placed on a course of doxycycline. There is no evidence of active cellulitis today. He has had no imaging. He has not had repeat vascular evaluation. There is some description of pain in his leg at rest.  He is not active enough to discern any possible claudication. He is anticoagulated on both Eliquis and Plavix. There is no current dressing care plan. He returns on 10/31/2022. We are very fortunate that he was evaluated by vascular surgery who performed an arteriogram showing significant arterial disease. Unfortunately, his disease is not amenable to percutaneous intervention and will require a bypass in the coming weeks.   Debridement was not aggressively performed today but some loose tissue was selectively debrided and well-tolerated. He has been participating with home therapy including ambulation with a walker. Current dressing is Betadine. He returns on 11/10/2022. He is doing better with no evidence of cellulitis. He has completed antibiotics. The ulcer looks improved with current dressing of Betadine. He sees vascular on Monday to schedule bypass. He returns on 12/1/2022. He is actually improving with the heel ulcer showing improvement with just Betadine dressings. I believe he is offloading better than previously. I am not sure that the wife grasps the offloading and heel offloading concept. Vascular surgery has him set up for Femoral to distal bypass in January, but they feel he may heal without it. He was seen by ID who did not recommend further antibiotics for his foot or for his discitis. They recommend against the bypass if he continues to heal as well. He also developed issues with his eye with an acute glaucoma attack. He is being treated with nonsurgical methods at present but may need cataract surgery. He returns on 12/8/2022. There is some wound improvement. We performed skin substitute grafting with PuraPly AM #1 today which was well-tolerated. Offloading and protecting from pressure was again stressed. He returns on 12/15/2022. There is improvement and better offloading. PuraPly AM #2 was applied today and well-tolerated. He returns today on 1/19/2023. He was recently hospitalized with bacteremia of unknown source. The source was not his foot. He missed his last appointment due to this. He is doing better and the wound actually is improved probably from better offloading. PuraPly AM #3 was applied today and well tolerated. He returns on 1/26/2023. He remains well. The wound is improving and PuraPly AM #4 was applied today and well-tolerated.     This information was obtained from the patient  The following HPI elements were documented for the patient's wound:  Location: Left heel  Duration: August/September 2022  Severity: Fat layer exposed  Context: Limited activity, much lying in bed, PVD, DM, no offloading  Modifying Factors:  Nothing makes better or worse  Quality: Full-thickness, painful  Timing: Constant  Associated Signs and Symptoms: Tissue loss and possible bout of cellulitis      Ulcer Identification:  Ulcer Type: Diabetic foot ulcer left heel with fat layer exposed  Contributing Factors: Inadequate offloading, PVD, anticoagulated    PuraPlyAM: #1 (12/8/2022), #2 (12/15), #3 (1/19/2023), #4 (1/26)        PAST MEDICAL HISTORY  Past Medical History:   Diagnosis Date    Acute respiratory failure with hypoxia ([address] 10/23/2014    MINI (acute kidney injury) ([address] 09/15/2014    MINI (acute kidney injury) ([address]     MINI (acute kidney injury) ([address]     Allergic rhinitis 11/10/2015    Asthma 11/10/2015    Benign essential hypertension 11/10/2015    Benign neoplasm of colon 11/10/2015    CAD (coronary artery disease) 11/10/2015    CAD (coronary artery disease) 1998, 1999    mix2, 3 stents [id number]    CAP (community acquired pneumonia) 12/31/2020    Cardiomyopathy ([address] 89/42/9867    Complicated wound infection 11/10/2015    COPD (chronic obstructive pulmonary disease) with emphysema ([address] 11/10/2015    COPD exacerbation ([address] 10/12/2011    COVID-19 12/31/2020    Diabetes mellitus type 2, controlled ([address] 11/10/2015    doesn/t check daily oral meds, A1c 6.0 (8/10/22)    Diabetic neuropathy ([address] 11/10/2015    Disruption of wound, unspecified 10/09/2014    Encounter for long-term (current) use of other high-risk medications 11/10/2015    Extremity atherosclerosis with intermittent claudication ([address] 11/10/2015    H/O endarterectomy 11/10/2015    Hiatal hernia 11/10/2015    History of 2019 novel coronavirus disease (COVID-19)     12/31/2020- hospitalized    Hyperglycemia due to type 1 diabetes mellitus ([address] 11/10/2015    Hyperlipidemia 11/10/2015    ICD (implantable cardioverter-defibrillator) in place 06/16/2022    Monomorphic ventricular tachycardia 12/31/2020    Myocardial infarction [facility]) 1999    Myocardial infarction ([facility] 11/10/2015    Obesity 11/10/2015    Orthostatic hypotension 11/10/2015    Osteoarthritis 11/10/2015    Peripheral vascular disease ([facility] 11/10/2015    PNA (pneumonia) 02/08/2019    PVC (premature ventricular contraction)     Rash 40/68/7593    Seroma complicating a procedure 10/02/2014    Sleep apnea     cpap    Toe laceration     Type 2 diabetes with nephropathy ([facility]     Uncontrolled type 2 diabetes mellitus 11/10/2015    Vertiginous syndromes and other disorders of vestibular system 11/10/2015        PAST SURGICAL HISTORY  Past Surgical History:   Procedure Laterality Date    CARDIAC CATHETERIZATION  12/05/2018    LV EF=40%. LM:nl. LAD:30-40% mid stenosis. LCX OM1:95% stenosis. RCA:100% occlusion at RV branch.     CATARACT REMOVAL Right 07/20/2022    CORONARY ANGIOPLASTY WITH STENT PLACEMENT  12/05/2018    LCX OM1:2.5 x [address] RAKESH    CORONARY ANGIOPLASTY WITH STENT PLACEMENT  1999    LA UNLISTED PROCEDURE ABDOMEN PERITONEUM & OMENTUM  1960    abdominal cyst removed     [address]    stents x3    SPINE SURGERY N/A 07/19/2022    LUMBAR 2, LUMBAR 4 KYPHOPLASTY AND ANY OTHER INDICATED LEVELS performed by Delfino Mishra MD at [address] [place] [address]  11/5/14    Repair of right common femoral artery     VASCULAR SURGERY  9/11/14    tiffanie femoral endarterectomy    VASCULAR SURGERY Left 10/28/2022    LEFT LOWER EXTREMITY ARTERIOGRAM / ULTRASOUND GUIDED ACCESS   performed by Josefa Canas MD at [facility] MAIN OR       FAMILY HISTORY  Family History   Problem Relation Age of Onset    Breast Cancer Mother     Hypertension Mother     Other Father         DIVERTICULITIS    Crohn's Disease Sister     Lung Disease Brother         mesothioloma    Diabetes Sister     Heart Attack Father Heart Disease Father     Stroke Mother        SOCIAL HISTORY  Social History     Tobacco Use    Smoking status: Former     Packs/day: 1.00     Years: 50.00     Pack years: 50.00     Types: Cigarettes     Quit date: 10/2/2011     Years since quittin.3    Smokeless tobacco: Current     Types: Chew    Tobacco comments:     Chews tobacco daily   Vaping Use    Vaping Use: Never used   Substance Use Topics    Alcohol use: Yes     Alcohol/week: 0.0 standard drinks    Drug use: No       ALLERGIES  Allergies   Allergen Reactions    Acetaminophen Hives     Per spouse has small amount of hives, takes 1/2 and tolerates     Azithromycin Hives    Morphine Hallucinations     Muscle twitching. jerking    Oxaprozin Other (See Comments)     ELEVATED BLOOD PRESSURE    Oxycodone Anxiety       MEDICATIONS  Current Outpatient Medications on File Prior to Encounter   Medication Sig Dispense Refill    dorzolamide-timolol (COSOPT) 22.3-6.8 MG/ML ophthalmic solution       montelukast (SINGULAIR) 10 MG tablet TAKE 1 TABLET BY MOUTH EVERY DAY AT BEDTIME 90 tablet 3    magnesium oxide (MAG-OX) 400 (240 Mg) MG tablet TAKE 1 TABLET BY MOUTH EVERY DAY 90 tablet 3    albuterol (PROVENTIL) (2.5 MG/3ML) 0.083% nebulizer solution 1 vial via nebulizer 4 times daily if needed for shortness of breath or wheezing. Diagnosis-COPD, J44.9. Bill to Medicare part B 360 mL 11    carvedilol (COREG) 25 MG tablet Take 1 tablet by mouth 2 times daily (with meals) 60 tablet 11    senna-docusate (PERICOLACE) 8.6-50 MG per tablet Take 1 tablet by mouth nightly      valsartan (DIOVAN) 80 MG tablet Take 1 tablet by mouth daily 30 tablet 5    CPAP Machine MISC by Does not apply route      albuterol sulfate HFA (PROVENTIL;VENTOLIN;PROAIR) 108 (90 Base) MCG/ACT inhaler USE 2 PUFFS BY INHALATION 4 TIMES DAILY AS NEEDED FOR WHEEZING OR SHORTNESS OF BREATH. Patient prefers ventolin.  18 g 11    GUAIFENESIN 1200 PO Take 1 tablet by mouth every 12 hours as needed tamsulosin (FLOMAX) 0.4 MG capsule Take 0.4 mg by mouth daily (Patient not taking: Reported on 1/26/2023)      traMADol (ULTRAM) 50 MG tablet Take 50 mg by mouth every 6 hours as needed for Pain. Taking 1/2 every morning (Patient not taking: Reported on 1/26/2023)      acetaminophen (TYLENOL) 500 MG tablet Take 500 mg by mouth every 6 hours as needed for Pain Taking 1/2 two times daily      glipiZIDE (GLUCOTROL XL) 10 MG extended release tablet Take 1 tablet by mouth daily 30 tablet 0    furosemide (LASIX) 20 MG tablet Take 1 tablet by mouth in the morning. (Patient taking differently: Take 20 mg by mouth daily 1/2 tablet) 60 tablet 3    [DISCONTINUED] amLODIPine (NORVASC) 5 MG tablet Take 1 tablet by mouth in the morning. 30 tablet 3    [DISCONTINUED] QUEtiapine (SEROQUEL) 25 MG tablet Take 1 tablet by mouth in the morning. 60 tablet 3    rosuvastatin (CRESTOR) 10 MG tablet TAKE 1 TABLET BY MOUTH EVERY DAY (Patient taking differently: at bedtime) 90 tablet 0    fluticasone-salmeterol (ADVAIR) 250-50 MCG/ACT AEPB diskus inhaler Inhale 1 puff into the lungs in the morning and at bedtime      amiodarone (CORDARONE) 200 MG tablet Take 200 mg by mouth daily      apixaban (ELIQUIS) 5 MG TABS tablet Take 5 mg by mouth every 12 hours      ascorbic acid (VITAMIN C) 500 MG tablet Take 500 mg by mouth      Cholecalciferol 50 MCG (2000 UT) TABS Take 2,000 Units by mouth      clopidogrel (PLAVIX) 75 MG tablet Take 75 mg by mouth daily      fluticasone (FLONASE) 50 MCG/ACT nasal spray USE 1 OR 2 SPRAYS IN EACH NOSTRIL EVERY DAY. latanoprost (XALATAN) 0.005 % ophthalmic solution Apply 1 drop to eye nightly      nitroGLYCERIN (NITROSTAT) 0.4 MG SL tablet Place 0.4 mg under the tongue      [DISCONTINUED] metFORMIN (GLUCOPHAGE) 500 MG tablet TAKE TWO TABLETS BY MOUTH 2 TIMES A DAY       No current facility-administered medications on file prior to encounter.          REVIEW OF SYSTEMS  The patient has no difficulty with chest pain or shortness of breath. No fever or chills. Comprehensive review of systems was otherwise unremarkable except as noted above. Objective:   Physical Exam:   Recent vitals (if inpt):  Patient Vitals for the past 24 hrs:   BP Temp Temp src Pulse Height Weight   01/26/23 1541 126/87 97 °F (36.1 °C) Temporal 58 -- --   01/26/23 1516 -- -- -- -- 6' 1\" (1.854 m) 226 lb (102.5 kg)       /87   Pulse 58   Temp 97 °F (36.1 °C) (Temporal)   Ht 6' 1\" (1.854 m)   Wt 226 lb (102.5 kg)   BMI 29.82 kg/m²   Wt Readings from Last 3 Encounters:   01/26/23 226 lb (102.5 kg)   01/26/23 226 lb (102.5 kg)   01/19/23 222 lb (100.7 kg)     Constitutional: Alert, oriented, cooperative patient in no acute distress; appears stated age;  General appearance is within normal limits for wound care patient population. See the today's recorded vitals signs and constitutional data. Eyes: Pupils equal; Sclera are clear. EOMs intact; The eyes appear to track and move normally. The sclera are not injected. The conjunctive are clear. The eyelids are normal. There is no scleral icterus. ENMT: There are no obvious external ear, nose, lip or mouth lesions. Nares normal; Neck: Overall contour of the neck is normal with no obvious neck masses. Gross hearing is within normal limits. No obvious neck masses  Resp:  Breathing is non-labored; normal rate and effort; no audible wheezing. CV: RRR;  no JVD; No evidence of cyanosis of the upper extremities. The extremities are perfused without embolic sign, splinter hemorrhages, or petechia. GI: soft and non-distended without acute abnormality noted. Musculoskeletal: unremarkable with normal function. No embolic signs or cyanosis. Neuro:  Oriented; moves all 4; no focal deficits  Psychiatric: Judgement and insight are within normal limits for the wound care population of patients. Patient is oriented to person, place, and time. Recent and remote memory are within normal limits. Mood and affect are within normal limits. Integumentary (Skin/Wounds)  See inspection of wound(s) below. There are no other skin areas of palpable concern. See wound center documentation including photos in the [facility] [address] [place] [address] Wound Template made part of this record by reference. Wound Care Documentation:    Wound 09/09/22 Heel Left;Lateral (Active)   Wound Image   01/26/23 1542   Wound Etiology Diabetic Martinez 2 01/26/23 1542   Dressing Status Clean;Dry; Intact 01/26/23 1542   Wound Cleansed Cleansed with saline 01/26/23 1542   Dressing/Treatment Foam 01/26/23 1542   Offloading for Diabetic Foot Ulcers Other (comment) 12/08/22 1337   Wound Length (cm) 0.5 cm 01/26/23 1542   Wound Width (cm) 1 cm 01/26/23 1542   Wound Depth (cm) 0.1 cm 01/26/23 1542   Wound Surface Area (cm^2) 0.5 cm^2 01/26/23 1542   Change in Wound Size % (l*w) 91.67 01/26/23 1542   Wound Volume (cm^3) 0.05 cm^3 01/26/23 1542   Wound Healing % 98 01/26/23 1542   Wound Assessment Granulation tissue 01/26/23 1542   Drainage Amount Moderate 01/26/23 1542   Drainage Description Serosanguinous 01/26/23 1542   Odor None 01/26/23 1542   Joanna-wound Assessment Intact 01/26/23 1542   Margins Attached edges 10/31/22 1115   Wound Thickness Description not for Pressure Injury Full thickness 01/26/23 1542   Number of days: 139       Wound 09/13/22 Buttocks moisture skin damage (Active)   Number of days: 135        Initial WC visit 10/24/2022      F/U on 12/1/2022    Amount and/or Complexity of Data Reviewed and Analyzed:  I reviewed and analyzed all of the unique labs and radiologic studies that are shown below as well as any that are in the HPI, and any that are in the expanded problem list below  *Each unique test, order, or document contributes to the combination of 2 or combination of 3 in Category 1 below. I also independently reviewed radiology images for studies I described above in the HPI or studies I have ordered.    For this visit I also reviewed old records and prior notes. No results for input(s): WBC, HGB, PLT, NA, K, CL, CO2, BUN, CREA, GLU, INR, APTT, ALT, AML, AML, LCAD, PCO2, PO2, HCO3 in the last 72 hours. Invalid input(s): PTP, TBIL, TBILI, CBIL, SGOT, GPT, AP, LPSE, NH4, TROPT, TROIQ,  PH    No results for input(s): INR, APTT, ALT, AML in the last 72 hours. Invalid input(s): PTP, TBIL, TBILI, CBIL, SGOT, GPT, AP, LPSE      Most recent blood counts (CBC) review  Lab Results   Component Value Date    WBC 5.7 01/16/2023    HGB 10.0 (L) 01/16/2023    HCT 30.9 01/16/2023    MCV 94.8 01/16/2023     01/16/2023     Most recent chemistry (BMP) test review   Lab Results   Component Value Date/Time     01/12/2023 08:37 AM    K 4.0 01/12/2023 08:37 AM     01/12/2023 08:37 AM    CO2 28 01/12/2023 08:37 AM    BUN 9 01/12/2023 08:37 AM    CREATININE 1.20 01/16/2023 03:20 PM    GLUCOSE 118 01/12/2023 08:37 AM    CALCIUM 8.9 01/12/2023 08:37 AM      Most recent Liver enzyme test review  Lab Results   Component Value Date    ALT 25 01/16/2023    AST 13 (L) 01/16/2023    ALKPHOS 84 01/16/2023    BILITOT 0.5 01/16/2023     Most recent coagulation (coags) review  @lastinr@  Lab Results   Component Value Date/Time    INR 1.3 07/30/2022 09:53 AM    APTT 63.6 08/02/2022 05:41 AM    APTT 24.3 03/04/2022 03:57 PM       Diabetic assessment  Hemoglobin A1C   Date Value Ref Range Status   12/29/2022 5.6 4.8 - 5.6 % Final       Nutritional assessment screen to assess wound healing ability:  Lab Results   Component Value Date    LABALBU 3.2 01/16/2023     No results found for: TP, ALBR, ALB    Xray Result (most recent):  XR CHEST PORTABLE 12/29/2022    Narrative  CHEST X-RAY, single portable view  12/29/2022    History: Hypoxic. Recent pneumonia. Technique: Single frontal view of the chest.    Comparison: Chest x-ray 9/2/2022    Findings:  A grossly stable left-sided intracardiac device is seen.  The cardiac silhouette  is mildly enlarged although decreased in size from the prior comparison study. The lungs are expanded without evidence for pneumothorax. No consolidative  airspace process or pleural effusion is seen. Only stable basilar reticular  interstitial prominence is seen most consistent with scarring or potential  atelectasis. Impression  1. Only mild cardiomegaly seen which does appear improved from the prior study. Some component of early or mild heart failure is difficult to exclude given the  patient's acute symptoms, however. No potential acute process is otherwise  suggested. CT Result (most recent):  CT HEAD WO CONTRAST 12/29/2022    Narrative  CT HEAD WITHOUT CONTRAST, 12/29/2022    History: Altered mental status. Comparison: None    Technique:   5 mm axial scans from the skull base to the vertex. All CT scans  performed at this facility use one or all of the following: Automated exposure  control, adjustment of the mA and/or kVp according to patient's size, iterative  reconstruction. Findings:  No evidence of intracranial hemorrhage is seen. No abnormal  extra-axial fluid collections are seen. Age related chronic cortical volume  loss is seen. No evidence for acute hydrocephalus is seen. No evidence of  midline shift or herniation is seen. No abnormal edema pattern is seen in a  vascular distribution to suggest large artery infarction. Evaluation with bone windows shows no acute osseous changes. There is a  moderate right mastoid effusion with fluid entering the right middle ear. Impression  1. No acute intracranial process evident by noncontrast CT study of the head. 2.  Moderate right mastoid effusion with fluid entering the right middle ear. This can been indicator for right otomastoiditis. Recommend clinical  correlation. This report was made using voice transcription.  Despite my best efforts to avoid  any, transcription errors may persist. If there is any question about the  accuracy of the report or need for clarification, then please call [phone], or text me through perfectserv for clarification or correction. 07/06/22    VAS DUP LOWER EXT ARTERIES BILATERAL W JOVANA 07/07/2022  7:57 AM (Final)    Interpretation Summary    Right lower extremity: The JOVANA (ankle-brachial index) is 0.78 and is abnormal. The lower extremity arterial duplex reveals an occlusion of the proximal superficial femoral artery with reconstitution at the distal proximal superficial femoral artery. There is monophasic flow in the GILBERTO, PTA and peroneal arteries at the ankle. The great toe pressure is 64mmHg. Left lower extremity: Right lower extremity: The JOVANA (ankle-brachial index) is 0.60 and is abnormal. The lower extremity arterial duplex reveals an occlusion of the proximal superficial femoral artery with reconstitution at the below knee popliteal artery. There is monophasic flow in the GILBERTO, PTA and peroneal arteries at the ankle. The great toe pressure is 63mmHg. The abdominal aorta was evaluated and measured 2.8cm x 2.9cm at its maximum diameter, no aneurysm visualized on limited evaluation of abdominal aorta. Signed by: Stephanie Adam MD on 7/7/2022  7:57 AM        Admission date (for inpatients): 1/26/2023   Day of Surgery  * No procedures listed *    Procedure:  Skin Substitute Application    Performed by: Anne-Marie Mortensen MD  Ulcer Type: arterial and diabetic  Consent obtained: Yes  Time out taken: Yes    Product Utilized:  PuraPly AM 1.6 sq/cm  Fenestrated at the time of procedure: Yes  Instrument(s) utilized during the procedure: #15 surgical blade    Skin Substitute was Applied to Ulcer Number(s):    Ulcer #: 1  Total Surface Area of Ulcer(s) Covered 0.5 sq/cm  Was the Product Layered  No   Amount of Product Applied 1.6 sq/cm   Amount of Product Wasted 0 sq/cm   Reason for Waste: N/A. Skin Substitute was surgically fixated and secured with Other: silicone dressing.     Wound 09/09/22 Heel Left;Lateral (Active)   Wound Image   01/26/23 1542   Wound Etiology Diabetic Martinez 2 01/26/23 1542   Dressing Status Clean;Dry;Intact 01/26/23 1542   Wound Cleansed Cleansed with saline 01/26/23 1542   Dressing/Treatment Foam 01/26/23 1542   Offloading for Diabetic Foot Ulcers Other (comment) 12/08/22 1337   Wound Length (cm) 0.5 cm 01/26/23 1542   Wound Width (cm) 1 cm 01/26/23 1542   Wound Depth (cm) 0.1 cm 01/26/23 1542   Wound Surface Area (cm^2) 0.5 cm^2 01/26/23 1542   Change in Wound Size % (l*w) 91.67 01/26/23 1542   Wound Volume (cm^3) 0.05 cm^3 01/26/23 1542   Wound Healing % 98 01/26/23 1542   Wound Assessment Granulation tissue 01/26/23 1542   Drainage Amount Moderate 01/26/23 1542   Drainage Description Serosanguinous 01/26/23 1542   Odor None 01/26/23 1542   Joanna-wound Assessment Intact 01/26/23 1542   Margins Attached edges 10/31/22 1115   Wound Thickness Description not for Pressure Injury Full thickness 01/26/23 1542   Number of days: 139       Wound 09/13/22 Buttocks moisture skin damage (Active)   Number of days: 135      Procedural Pain: 0/10   Post Procedural Pain: 0 / 10  Response to Treatment: Patient tolerated procedure well with no complaints of pain.      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 *  Resolved Problems:    * No resolved hospital problems.  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 ([facility]) 10/24/2022     Priority: High    Antiplatelet or antithrombotic long-term use 10/24/2022     Priority: High     Eliquis and Plavix      Chest pain 12/05/2018     Priority: High    COPD exacerbation ([facility]) 12/29/2022     Priority: Medium    Influenza 12/29/2022     Priority: Medium    Lumbar discitis 09/08/2022     Priority: Medium    Psoas muscle abscess [facility] [facility]) 09/08/2022     Priority: Medium    Physical debility 08/26/2022     Priority: Medium    General weakness 07/27/2022     Priority: Medium    Acute cystitis without hematuria 07/27/2022     Priority: Medium    Low back pain without sciatica 07/27/2022     Priority: Medium    Back pain 07/27/2022     Priority: Medium    DNI (do not intubate) 07/27/2022     Priority: Medium    Elevated liver enzymes 07/27/2022     Priority: Medium    Anemia 07/27/2022     Priority: Medium    Hyponatremia 07/27/2022     Priority: Medium    ICD (implantable cardioverter-defibrillator) in place 06/16/2022     Priority: Medium    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, vertebra(e), init ([facility]) 07/07/2022     Priority: Low     Added automatically from request for surgery [id number]      Atrial fibrillation ([facility]) 03/04/2022     Priority: Low    Ventricular tachycardia 03/04/2022     Priority: Low    VT (ventricular tachycardia) 03/04/2022     Priority: Low    Acute metabolic encephalopathy 05/12/2021     Priority: Low    Irregular heart beat 03/02/2021     Priority: Low    PVC's (premature ventricular contractions) 03/02/2021     Priority: Low    Elevated troponin 12/31/2020     Priority: Low    Toe laceration 12/09/2019     Priority: Low    Type 2 diabetes with nephropathy ([facility]) 06/17/2019     Priority: Low    Hypoxia 02/08/2019     Priority: Low    Sepsis ([facility]) 02/08/2019     Priority: Low    Abnormal nuclear cardiac imaging test 11/27/2018      Priority: Low    Cigarette nicotine dependence in remission 08/20/2018     Priority: Low    Chronic pain of right knee 12/14/2017     Priority: Low    Obstructive sleep apnea 08/11/2017     Priority: Low    Intermittent spinal claudication ([facility] 06/13/2017     Priority: Low    Pulmonary emphysema ([facility] 11/17/2016     Priority: Low    H/O endarterectomy 11/10/2015     Priority: Low    Complicated wound infection 11/10/2015     Priority: Low    Allergic rhinitis 11/10/2015     Priority: Low    Hiatal hernia 11/10/2015     Priority: Low    Diabetic neuropathy ([facility] 11/10/2015     Priority: Low    Osteoarthritis 11/10/2015     Priority: Low    Encounter for long-term (current) drug use 11/10/2015     Priority: Low    Benign neoplasm of colon 11/10/2015     Priority: Low    PAD (peripheral artery disease) ([facility] 11/10/2015     Priority: Low    Asthma 11/10/2015     Priority: Low    Orthostatic hypotension 11/10/2015     Priority: Low    Hyperlipidemia 11/10/2015     Priority: Low    S/P angioplasty with stent 11/10/2015     Priority: Low    COPD (chronic obstructive pulmonary disease) ([facility] 04/10/2015     Priority: Low    Arterial degeneration 11/05/2014     Priority: Low     11/6/14 (Dr Paula Myrick) 1. Bleeding from right groin wound. 2. Disruption of right common femoral artery patch anastomosis and   possible arterial infection. Normocytic anemia 10/23/2014     Priority: Low    MINI (acute kidney injury) ([facility] 09/15/2014     Priority: Low    DM (diabetes mellitus) type II, controlled, with peripheral vascular disorder ([facility] 09/11/2014     Priority: Low    Atherosclerosis of native arteries of extremity with intermittent claudication ([facility] 08/15/2014     Priority: Low     9/11/14 (Dr Paula Myrick) Bilateral common femoral endarterectomies.         Personal history of tobacco use, presenting hazards to health 02/12/2013     Priority: Low    Asbestos exposure 02/12/2013     Priority: Low    HTN (hypertension) 10/12/2011 Priority: Low    Severe obesity (BMI 35.0-39. 9) with comorbidity ([address] 10/12/2011     Priority: Low    Sleep apnea 10/12/2011     Priority: Low    Ischemic cardiomyopathy 10/12/2011     Priority: Low    Coronary artery disease involving native coronary artery of native heart without angina pectoris 10/12/2011     Priority: Low           Plan:     Number and Complexity of Problems addressed and   Risks of complications and/or morbidity of management    Treatment Note please see attached Discharge Instructions  Any procedures done today in the wound center (if documented in a separate note) in [facility] are made part of this record by reference  Written patient dismissal instructions given to patient or POA. 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 He has had an ulcer on the left heel for several months. He has been debilitated following back surgery but is also debilitated from multiple medical problems including cardiac disease, peripheral vascular disease, and diabetes. He is anticoagulated on Eliquis and Plavix. He has had femoral endarterectomies in the past and is now followed by Dr. Nikky Jensen. JOVANA in July was abnormal and has not been reevaluated since deterioration of his tissue. He is scheduled for repeat JOVANA in January and we will consult vascular surgery for earlier evaluation. Vascular consultation to follow as needed. He has not been offloading despite having work boots. There is clearly a pressure component. There is no mirror lesion on the right heel and may reflect his discrepancy in blood supply.   He does describe some episodes that could be rest pain though it does not drop his legs for improvement. This may also be limited by his debility. We will not do debridement today. We will dress with Betadine and an absorbent pad and follow-up in 1 week. Offloading was emphasized. He returns on 10/31/2022. He underwent arteriography but is not a candidate for percutaneous intervention. He is being set up for formal bypass in November. Therapy is having him walk on his foot and offloading was again stressed. Current dressing remains Betadine. I will see him back in 1.5 weeks which will be before his bypass surgery. He returns on 11/10/2022. He is improved and has completed antibiotics. Current dressing is Betadine. He will see vascular surgery on Monday to schedule bypass surgery. He returns on 12/1/2022. He was seen by vascular surgery who have set him up for femoral to distal bypass in January, but feel it may not be necessary as he continues to heal.  This is probably secondary to better offloading. He was also seen by ID who also feel that the bypass should be avoided if possible since he is continuing to heal.  I agree with both specialists that he is continuing to heal and we will move forward with adjuncts to healing. We will start with skin substitutes. I will order PuraPly AM for next visit. This would be the best place to start when we cannot perform aggressive debridement due to his vascular status. We will then transition to a growth factor product. Returns on 12/8/2022. There continues to be some improvement. Still some issues with understanding offloading. PuraPly AM #1 was applied today and well-tolerated. We will order a graft for next week. He returns on 12/15/2022. There is some improvement. They have better understanding of offloading. PuraPly AM #2 was applied today and well-tolerated. He returns on 1/19/2023.   He missed an appointment because he was hospitalized with bacteremia of unknown source. It was not from his foot. The foot wound has actually improved, likely from improved offloading while being hospitalized. PuraPly AM #3 was applied today and well-tolerated. He returns on 1/26/2023. Remains well and the wound continues to get smaller using PuraPly. Graft #4 was applied today and well-tolerated. We will order another graft for next week and see him back in 1 week. Wound Care  Orders Placed This Encounter   Procedures    CO DME SUPPLY OR ACCESSORY, NOS     PuraPlyAM 16 mm disc for next week         Return Appointment:   1 week with Rayna Nolan MD        Instructions: left heel:  Applied puraply #4 today ( lot#: [id number]. 1.1j exp date: 11/16/2024)  Secured with mepilex transfer, abd pad and rolled gauze  Leave in place for one week     Plan for puraply #5 at next visit      Level of MDM (2/3 elements below)  Number and Complexity of Problems Addressed Amount and/or Complexity of Data to be Reviewed and Analyzed  *Each unique test, order, or document contributes to the combination of 2 or combination of 3 in Category 1 below. Risk of Complications and/or Morbidity or Mortality of pt Management     61307  41924  Minimal  ?1self-limited or minor problem Minimal or none Minimal risk of morbidity from additional diagnostic testing or Rx   06710  89153 Low Low  ? 2or more self-limited or minor problems;    or  ? 1stable chronic illness;    or  ?1acute, uncomplicated illness or injury   Limited  (Must meet the requirements of at least 1 of the 2 categories)  Category 1: Tests and documents   ? Any combination of 2 from the following:  ?Review of prior external note(s) from each unique source*;  ?review of the result(s) of each unique test*;   ?ordering of each unique test*    or   Category 2: Assessment requiring an independent historian(s)  (For the categories of independent interpretation of tests and discussion of management or test interpretation, see moderate or high) Low risk of morbidity from additional diagnostic testing or treatment     26600  10815 Mod Moderate  ? 1or more chronic illnesses with exacerbation, progression, or side effects of treatment;    or  ?2or more stable chronic illnesses;    or  ?1undiagnosed new problem with uncertain prognosis;    or  ?1acute illness with systemic symptoms;    or  ?1acute complicated injury   Moderate  (Must meet the requirements of at least 1 out of 3 categories)  Category 1: Tests, documents, or independent historian(s)  ? Any combination of 3 from the following:   ?Review of prior external note(s) from each unique source*;  ?Review of the result(s) of each unique test*;  ?Ordering of each unique test*;  ?Assessment requiring an independent historian(s)    or  Category 2: Independent interpretation of tests   ? Independent interpretation of a test performed by another physician/other qualified health care professional (not separately reported);     or  Category 3: Discussion of management or test interpretation  ? Discussion of management or test interpretation with external physician/other qualified health care professional/appropriate source (not separately reported)   Moderate risk of morbidity from additional diagnostic testing or treatment  Examples only:  ?Prescription drug management - advanced wound care prescription Rx wound care products  (eg Iodosorb, hydrofera, silvadene, collagen, puracol plus, optiloc, biologics - placenta, Theraskin, Apligraf). Note also that if home health care is involved, they will not apply topical therapies without a prescription/order from physician      ? Decision regarding minor surgery with identified patient or procedure risk factors  ? Decision regarding elective major surgery without identified patient or procedure risk factors   ? Diagnosis or treatment significantly limited by social determinants of health       [id number] High High  ? 1or more chronic illnesses with severe exacerbation, progression, or side effects of treatment;    or  ?1 acute or chronic illness or injury that poses a threat to life or bodily function   Extensive  (Must meet the requirements of at least 2 out of 3 categories)  Category 1: Tests, documents, or independent historian(s)  ? Any combination of 3 from the following:   ?Review of prior external note(s) from each unique source*;  ?Review of the result(s) of each unique test*;   ?Ordering of each unique test*;   ?Assessment requiring an independent historian(s)    or   Category 2: Independent interpretation of tests   ? Independent interpretation of a test performed by another physician/other qualified health care professional (not separately reported);     or  Category 3: Discussion of management or test interpretation  ? Discussion of management or test interpretation with external physician/other qualified health care professional/appropriate source (not separately reported)   High risk of morbidity from additional diagnostic testing or treatment  Examples only:  ?Drug therapy requiring intensive monitoring for toxicity  ? Decision regarding elective major surgery with identified patient or procedure risk factors  ? Decision regarding emergency major surgery  ? Decision regarding hospitalization  ? Decision not to resuscitate or to de-escalate care because of poor prognosis                 I have personally performed a face-to-face diagnostic evaluation and management  service on this patient. I have independently seen the patient. I have independently obtained the above history from the patient/family. I have independently examined the patient with above findings. I have independently reviewed data/labs for this patient and developed the above plan of care (MDM).       Electronically signed by Vin Mcclendon MD on 1/26/2023 at 7:41 PM

## 2023-02-01 ENCOUNTER — TELEPHONE (OUTPATIENT)
Dept: INTERNAL MEDICINE CLINIC | Facility: CLINIC | Age: 78
End: 2023-02-01

## 2023-02-01 ENCOUNTER — CARE COORDINATION (OUTPATIENT)
Dept: CARE COORDINATION | Facility: CLINIC | Age: 78
End: 2023-02-01

## 2023-02-01 NOTE — TELEPHONE ENCOUNTER
Kaitlin from [address] [facility] home [facility] called to get last office notes

## 2023-02-01 NOTE — CARE COORDINATION
Patient has graduated from the Care Transitions program on 2.1.23. Attempted outreach follow up without success, left message. Noted on chart review patient is following plan of care. Attends all follow up as scheduled. No new needs are noted at this time.   Patient will be graduated from [facility] program.     Patients upcoming visits:    Future Appointments   Date Time Provider Mac Almaraz   2/2/2023  1:15 PM Ignacia Cuellar MD [facility]   2/27/2023  2:40 PM AARON Cruz - CNP [facility]   3/6/2023  2:45 PM Jayne Chatterjee MD [facility]   7/26/2023  2:15 PM Geetha Weber MD [facility]

## 2023-02-02 ENCOUNTER — HOSPITAL ENCOUNTER (OUTPATIENT)
Dept: WOUND CARE | Age: 78
Discharge: HOME | End: 2023-02-02
Payer: MEDICARE

## 2023-02-02 DIAGNOSIS — Z98.890 H/O ENDARTERECTOMY: ICD-10-CM

## 2023-02-02 DIAGNOSIS — E11.42 DIABETIC POLYNEUROPATHY ASSOCIATED WITH TYPE 2 DIABETES MELLITUS (HCC): ICD-10-CM

## 2023-02-02 DIAGNOSIS — R53.81 PHYSICAL DEBILITY: ICD-10-CM

## 2023-02-02 DIAGNOSIS — I73.9 PAD (PERIPHERAL ARTERY DISEASE) (HCC): ICD-10-CM

## 2023-02-02 DIAGNOSIS — Z79.02 ANTIPLATELET OR ANTITHROMBOTIC LONG-TERM USE: Primary | ICD-10-CM

## 2023-02-02 DIAGNOSIS — E11.51 DM (DIABETES MELLITUS) TYPE II, CONTROLLED, WITH PERIPHERAL VASCULAR DISORDER (HCC): ICD-10-CM

## 2023-02-02 DIAGNOSIS — I70.90: ICD-10-CM

## 2023-02-02 DIAGNOSIS — R53.1 GENERAL WEAKNESS: ICD-10-CM

## 2023-02-02 DIAGNOSIS — L97.422 NON-PRESSURE CHRONIC ULCER OF LEFT HEEL AND MIDFOOT WITH FAT LAYER EXPOSED (HCC): ICD-10-CM

## 2023-02-02 PROCEDURE — 15004 WOUND PREP F/N/HF/G: CPT | Performed by: SURGERY

## 2023-02-02 PROCEDURE — 15275 SKIN SUB GRAFT FACE/NK/HF/G: CPT

## 2023-02-02 PROCEDURE — 15275 SKIN SUB GRAFT FACE/NK/HF/G: CPT | Performed by: SURGERY

## 2023-02-02 RX ORDER — BETAMETHASONE DIPROPIONATE 0.05 %
OINTMENT (GRAM) TOPICAL ONCE
OUTPATIENT
Start: 2023-02-02 | End: 2023-02-02

## 2023-02-02 RX ORDER — LIDOCAINE HYDROCHLORIDE 20 MG/ML
JELLY TOPICAL ONCE
OUTPATIENT
Start: 2023-02-02 | End: 2023-02-02

## 2023-02-02 RX ORDER — LIDOCAINE HYDROCHLORIDE 40 MG/ML
SOLUTION TOPICAL ONCE
OUTPATIENT
Start: 2023-02-02 | End: 2023-02-02

## 2023-02-02 RX ORDER — BACITRACIN ZINC AND POLYMYXIN B SULFATE 500; 1000 [USP'U]/G; [USP'U]/G
OINTMENT TOPICAL ONCE
OUTPATIENT
Start: 2023-02-02 | End: 2023-02-02

## 2023-02-02 RX ORDER — CLOBETASOL PROPIONATE 0.5 MG/G
OINTMENT TOPICAL ONCE
OUTPATIENT
Start: 2023-02-02 | End: 2023-02-02

## 2023-02-02 RX ORDER — GENTAMICIN SULFATE 1 MG/G
OINTMENT TOPICAL ONCE
OUTPATIENT
Start: 2023-02-02 | End: 2023-02-02

## 2023-02-02 RX ORDER — BACITRACIN, NEOMYCIN, POLYMYXIN B 400; 3.5; 5 [USP'U]/G; MG/G; [USP'U]/G
OINTMENT TOPICAL ONCE
OUTPATIENT
Start: 2023-02-02 | End: 2023-02-02

## 2023-02-02 RX ORDER — LIDOCAINE 50 MG/G
OINTMENT TOPICAL ONCE
OUTPATIENT
Start: 2023-02-02 | End: 2023-02-02

## 2023-02-02 RX ORDER — GINSENG 100 MG
CAPSULE ORAL ONCE
OUTPATIENT
Start: 2023-02-02 | End: 2023-02-02

## 2023-02-02 RX ORDER — LIDOCAINE 40 MG/G
CREAM TOPICAL ONCE
OUTPATIENT
Start: 2023-02-02 | End: 2023-02-02

## 2023-02-02 NOTE — WOUND CARE
PuraPly AMTreatment Note    NAME:  Enid Melgar  YOB: 1945  MEDICAL RECORD NUMBER:  [id number]  DATE:  2/2/2023    Goal:  Patient will receive safe and proper application of skin substitute. Patient will comply with caring for dressing, and reporting complications. Expiration date checked immediately prior to use. Package intact prior to use and no damage noted. Transport temperature controlled and acceptable. PuraPly AM was removed from protective sterile packaging by provider and applied to prepared ulcer bed. PuraPly AM was hydrated with sterile normal saline per provider. PuraPly AM was applied to Left Heel and affixed with steri-strips by the provider. PuraPly AM was covered with non-adherent ulcer dressing. Applied Gauze & Mepilex Transfer over non-adherent. Applied dry gauze and/or roll gauze. Patient/caregiver was instructed not to remove dressing and to keep it clean and dry. Pt/family/caregiver was instructed on signs and symptoms of complications to report such as draining through dressing, dressing falling down/slipping, getting wet, or severe pain or tingling. PuraPly AM may be applied a total of 10 times per wound over a 12 week period. Date of first application of PuraPly for this current wound is December 8, 2022.  Guidelines followed    Electronically signed by Naomi Hurd.  Magaly Delacruz RN on 2/2/2023 at 2:22 PM

## 2023-02-02 NOTE — WOUND CARE
Discharge Instructions for  Windy Fairchild  [address] [place] [address]  [address] [address] [facility], [address]  Phone [phone]   Fax [phone]      NAME:  Bert Benton  YOB: 1945  MEDICAL RECORD NUMBER:  [id number]  DATE:  2/2/2023    Return Appointment:   1 week with Vicenta Alarcon MD      Instructions:   Left Heel:  Cleanse wound and periwound with wound cleanser or normal saline. Cleanse with Vashe. Applied #5 Puraply (1.6 cm disc) ( lot: [id number]   exp: 03/21/2025)  Secured with mepilex transfer, dry gauze, rolled gauze, and coban. Follow with [employer] [facility]. Dressing change 1x weekly. Plan for #6 Puraply at next appointment. Offload pressure from heel through use of wheelchair. Should you experience increased redness, swelling, pain, foul odor, size of wound(s), or have a temperature over 101 degrees please contact the [address] [place] [address] Road at [phone] or if after hours contact your primary care physician or go to the hospital emergency department. PLEASE NOTE: IF YOU ARE UNABLE TO OBTAIN WOUND SUPPLIES, CONTINUE TO USE THE SUPPLIES YOU HAVE AVAILABLE UNTIL YOU ARE ABLE TO REACH US. IT IS MOST IMPORTANT TO KEEP THE WOUND COVERED AT ALL TIMES. Electronically signed Booker Mosqueda RN on 2/2/2023 at 1:58 PM

## 2023-02-02 NOTE — PROGRESS NOTES
[address]  Consult Note/H&P/Progress Note      [address] RECORD NUMBER:  [id number]  AGE: 65 y.o. RACE White (non-)  GENDER: male  : 1945  EPISODE DATE:  2023       Subjective:      CC (Chief Complaint) and HISTORY of PRESENT ILLNESS (HPI):    Hong Steinberg is a 65 y.o. male who presents today for wound/ulcer evaluation. He presents on 10/24/2022 with an ulcer wound on the left heel that has been present for several months. He was in his usual state of poor health when in July he had back surgery which led to a complicated abscess, prolonged hospital stay, and rehabilitation stay which ended just a few weeks ago. He is currently at home. He has significant debility. He is diabetic and has significant peripheral vascular disease with history of bilateral femoral endarterectomies. These were performed several years ago by Dr. Inge Coleman. ABIs were performed in July just prior to his surgery with the JOVANA on the left of 0.6 with plan for follow-up this coming January to monitor. He has been lying in bed without use of Rooke boots and has developed an ulcer on the left heel. He has a history of MRSA infection in his back and was placed on a course of doxycycline. There is no evidence of active cellulitis today. He has had no imaging. He has not had repeat vascular evaluation. There is some description of pain in his leg at rest.  He is not active enough to discern any possible claudication. He is anticoagulated on both Eliquis and Plavix. There is no current dressing care plan. He returns on 10/31/2022. We are very fortunate that he was evaluated by vascular surgery who performed an arteriogram showing significant arterial disease. Unfortunately, his disease is not amenable to percutaneous intervention and will require a bypass in the coming weeks.   Debridement was not aggressively performed today but some loose tissue was selectively debrided and well-tolerated. He has been participating with home therapy including ambulation with a walker. Current dressing is Betadine. He returns on 11/10/2022. He is doing better with no evidence of cellulitis. He has completed antibiotics. The ulcer looks improved with current dressing of Betadine. He sees vascular on Monday to schedule bypass. He returns on 12/1/2022. He is actually improving with the heel ulcer showing improvement with just Betadine dressings. I believe he is offloading better than previously. I am not sure that the wife grasps the offloading and heel offloading concept. Vascular surgery has him set up for Femoral to distal bypass in January, but they feel he may heal without it. He was seen by ID who did not recommend further antibiotics for his foot or for his discitis. They recommend against the bypass if he continues to heal as well. He also developed issues with his eye with an acute glaucoma attack. He is being treated with nonsurgical methods at present but may need cataract surgery. He returns on 12/8/2022. There is some wound improvement. We performed skin substitute grafting with PuraPly AM #1 today which was well-tolerated. Offloading and protecting from pressure was again stressed. He returns on 12/15/2022. There is improvement and better offloading. PuraPly AM #2 was applied today and well-tolerated. He returns today on 1/19/2023. He was recently hospitalized with bacteremia of unknown source. The source was not his foot. He missed his last appointment due to this. He is doing better and the wound actually is improved probably from better offloading. PuraPly AM #3 was applied today and well tolerated. He returns on 1/26/2023. He remains well. The wound is improving and PuraPly AM #4 was applied today and well-tolerated. He returns on 2/2/2023. No medical issues.   The wound continues to improve and PuraPly AM #5 was applied today and well-tolerated.     This information was obtained from the patient  The following HPI elements were documented for the patient's wound:  Location: Left heel  Duration: August/September 2022  Severity: Fat layer exposed  Context: Limited activity, much lying in bed, PVD, DM, no offloading  Modifying Factors:  Nothing makes better or worse  Quality: Full-thickness, painful  Timing: Constant  Associated Signs and Symptoms: Tissue loss and possible bout of cellulitis      Ulcer Identification:  Ulcer Type: Diabetic foot ulcer left heel with fat layer exposed  Contributing Factors: Inadequate offloading, PVD, anticoagulated    PuraPlyAM: #1 (12/8/2022), #2 (12/15), #3 (1/19/2023), #4 (1/26), #5 (2/2)        PAST MEDICAL HISTORY  Past Medical History:   Diagnosis Date    Acute respiratory failure with hypoxia ([address] 10/23/2014    MINI (acute kidney injury) ([address] 09/15/2014    MINI (acute kidney injury) ([address]     MINI (acute kidney injury) ([address]     Allergic rhinitis 11/10/2015    Asthma 11/10/2015    Benign essential hypertension 11/10/2015    Benign neoplasm of colon 11/10/2015    CAD (coronary artery disease) 11/10/2015    CAD (coronary artery disease) 1998, 1999    mix2, 3 stents [id number]    CAP (community acquired pneumonia) 12/31/2020    Cardiomyopathy ([address] 89/00/8860    Complicated wound infection 11/10/2015    COPD (chronic obstructive pulmonary disease) with emphysema ([address] 11/10/2015    COPD exacerbation ([address] 10/12/2011    COVID-19 12/31/2020    Diabetes mellitus type 2, controlled ([address] 11/10/2015    doesn/t check daily oral meds, A1c 6.0 (8/10/22)    Diabetic neuropathy ([address] 11/10/2015    Disruption of wound, unspecified 10/09/2014    Encounter for long-term (current) use of other high-risk medications 11/10/2015    Extremity atherosclerosis with intermittent claudication ([address] 11/10/2015    H/O endarterectomy 11/10/2015    Hiatal hernia 11/10/2015    History of 2019 novel coronavirus disease (COVID-19)     12/31/2020- hospitalized    Hyperglycemia due to type 1 diabetes mellitus ([facility] 11/10/2015    Hyperlipidemia 11/10/2015    ICD (implantable cardioverter-defibrillator) in place 06/16/2022    Monomorphic ventricular tachycardia 12/31/2020    Myocardial infarction [facility]) 1999    Myocardial infarction ([facility] 11/10/2015    Obesity 11/10/2015    Orthostatic hypotension 11/10/2015    Osteoarthritis 11/10/2015    Peripheral vascular disease ([address] 11/10/2015    PNA (pneumonia) 02/08/2019    PVC (premature ventricular contraction)     Rash 41/11/9624    Seroma complicating a procedure 10/02/2014    Sleep apnea     cpap    Toe laceration     Type 2 diabetes with nephropathy ([facility]     Uncontrolled type 2 diabetes mellitus 11/10/2015    Vertiginous syndromes and other disorders of vestibular system 11/10/2015        PAST SURGICAL HISTORY  Past Surgical History:   Procedure Laterality Date    CARDIAC CATHETERIZATION  12/05/2018    LV EF=40%. LM:nl. LAD:30-40% mid stenosis. LCX OM1:95% stenosis. RCA:100% occlusion at RV branch.     CATARACT REMOVAL Right 07/20/2022    CORONARY ANGIOPLASTY WITH STENT PLACEMENT  12/05/2018    LCX OM1:2.5 x 12 Giuseppe RAKESH    CORONARY ANGIOPLASTY WITH STENT PLACEMENT  1999    LA UNLISTED PROCEDURE ABDOMEN PERITONEUM & OMENTUM  1960    abdominal cyst removed     [address]    stents x3    SPINE SURGERY N/A 07/19/2022    LUMBAR 2, LUMBAR 4 KYPHOPLASTY AND ANY OTHER INDICATED LEVELS performed by Cristi Meeks MD at [address] [place] [address]  11/5/14    Repair of right common femoral artery     VASCULAR SURGERY  9/11/14    tiffanie femoral endarterectomy    VASCULAR SURGERY Left 10/28/2022    LEFT LOWER EXTREMITY ARTERIOGRAM / ULTRASOUND GUIDED ACCESS   performed by Nidia Jones MD at [facility] MAIN OR       FAMILY HISTORY  Family History   Problem Relation Age of Onset    Breast Cancer Mother     Hypertension Mother     Other Father DIVERTICULITIS    Crohn's Disease Sister     Lung Disease Brother         mesothioloma    Diabetes Sister     Heart Attack Father     Heart Disease Father     Stroke Mother        SOCIAL HISTORY  Social History     Tobacco Use    Smoking status: Former     Packs/day: 1.00     Years: 50.00     Pack years: 50.00     Types: Cigarettes     Quit date: 10/2/2011     Years since quittin.3    Smokeless tobacco: Current     Types: Chew    Tobacco comments:     Chews tobacco daily   Vaping Use    Vaping Use: Never used   Substance Use Topics    Alcohol use: Yes     Alcohol/week: 0.0 standard drinks    Drug use: No       ALLERGIES  Allergies   Allergen Reactions    Acetaminophen Hives     Per spouse has small amount of hives, takes 1/2 and tolerates     Azithromycin Hives    Morphine Hallucinations     Muscle twitching. jerking    Oxaprozin Other (See Comments)     ELEVATED BLOOD PRESSURE    Oxycodone Anxiety       MEDICATIONS  Current Outpatient Medications on File Prior to Encounter   Medication Sig Dispense Refill    dorzolamide-timolol (COSOPT) 22.3-6.8 MG/ML ophthalmic solution       montelukast (SINGULAIR) 10 MG tablet TAKE 1 TABLET BY MOUTH EVERY DAY AT BEDTIME 90 tablet 3    magnesium oxide (MAG-OX) 400 (240 Mg) MG tablet TAKE 1 TABLET BY MOUTH EVERY DAY 90 tablet 3    albuterol (PROVENTIL) (2.5 MG/3ML) 0.083% nebulizer solution 1 vial via nebulizer 4 times daily if needed for shortness of breath or wheezing. Diagnosis-COPD, J44.9.   Bill to Medicare part B 360 mL 11    carvedilol (COREG) 25 MG tablet Take 1 tablet by mouth 2 times daily (with meals) 60 tablet 11    senna-docusate (PERICOLACE) 8.6-50 MG per tablet Take 1 tablet by mouth nightly      valsartan (DIOVAN) 80 MG tablet Take 1 tablet by mouth daily 30 tablet 5    CPAP Machine MISC by Does not apply route      albuterol sulfate HFA (PROVENTIL;VENTOLIN;PROAIR) 108 (90 Base) MCG/ACT inhaler USE 2 PUFFS BY INHALATION 4 TIMES DAILY AS NEEDED FOR WHEEZING OR SHORTNESS OF BREATH. Patient prefers ventolin. 18 g 11    GUAIFENESIN 1200 PO Take 1 tablet by mouth every 12 hours as needed      tamsulosin (FLOMAX) 0.4 MG capsule Take 0.4 mg by mouth daily (Patient not taking: Reported on 1/26/2023)      traMADol (ULTRAM) 50 MG tablet Take 50 mg by mouth every 6 hours as needed for Pain. Taking 1/2 every morning (Patient not taking: Reported on 1/26/2023)      acetaminophen (TYLENOL) 500 MG tablet Take 500 mg by mouth every 6 hours as needed for Pain Taking 1/2 two times daily      glipiZIDE (GLUCOTROL XL) 10 MG extended release tablet Take 1 tablet by mouth daily 30 tablet 0    furosemide (LASIX) 20 MG tablet Take 1 tablet by mouth in the morning. (Patient taking differently: Take 20 mg by mouth daily 1/2 tablet) 60 tablet 3    [DISCONTINUED] amLODIPine (NORVASC) 5 MG tablet Take 1 tablet by mouth in the morning. 30 tablet 3    [DISCONTINUED] QUEtiapine (SEROQUEL) 25 MG tablet Take 1 tablet by mouth in the morning. 60 tablet 3    rosuvastatin (CRESTOR) 10 MG tablet TAKE 1 TABLET BY MOUTH EVERY DAY (Patient taking differently: at bedtime) 90 tablet 0    fluticasone-salmeterol (ADVAIR) 250-50 MCG/ACT AEPB diskus inhaler Inhale 1 puff into the lungs in the morning and at bedtime      amiodarone (CORDARONE) 200 MG tablet Take 200 mg by mouth daily      apixaban (ELIQUIS) 5 MG TABS tablet Take 5 mg by mouth every 12 hours      ascorbic acid (VITAMIN C) 500 MG tablet Take 500 mg by mouth      Cholecalciferol 50 MCG (2000 UT) TABS Take 2,000 Units by mouth      clopidogrel (PLAVIX) 75 MG tablet Take 75 mg by mouth daily      fluticasone (FLONASE) 50 MCG/ACT nasal spray USE 1 OR 2 SPRAYS IN EACH NOSTRIL EVERY DAY.       latanoprost (XALATAN) 0.005 % ophthalmic solution Apply 1 drop to eye nightly      nitroGLYCERIN (NITROSTAT) 0.4 MG SL tablet Place 0.4 mg under the tongue      [DISCONTINUED] metFORMIN (GLUCOPHAGE) 500 MG tablet TAKE TWO TABLETS BY MOUTH 2 TIMES A DAY       No current facility-administered medications on file prior to encounter. REVIEW OF SYSTEMS  The patient has no difficulty with chest pain or shortness of breath. No fever or chills. Comprehensive review of systems was otherwise unremarkable except as noted above. Objective:   Physical Exam:   Recent vitals (if inpt):  No data found. There were no vitals taken for this visit. Wt Readings from Last 3 Encounters:   01/26/23 226 lb (102.5 kg)   01/26/23 226 lb (102.5 kg)   01/19/23 222 lb (100.7 kg)     Constitutional: Alert, oriented, cooperative patient in no acute distress; appears stated age;  General appearance is within normal limits for wound care patient population. See the today's recorded vitals signs and constitutional data. Eyes: Pupils equal; Sclera are clear. EOMs intact; The eyes appear to track and move normally. The sclera are not injected. The conjunctive are clear. The eyelids are normal. There is no scleral icterus. ENMT: There are no obvious external ear, nose, lip or mouth lesions. Nares normal; Neck: Overall contour of the neck is normal with no obvious neck masses. Gross hearing is within normal limits. No obvious neck masses  Resp:  Breathing is non-labored; normal rate and effort; no audible wheezing. CV: RRR;  no JVD; No evidence of cyanosis of the upper extremities. The extremities are perfused without embolic sign, splinter hemorrhages, or petechia. GI: soft and non-distended without acute abnormality noted. Musculoskeletal: unremarkable with normal function. No embolic signs or cyanosis. Neuro:  Oriented; moves all 4; no focal deficits  Psychiatric: Judgement and insight are within normal limits for the wound care population of patients. Patient is oriented to person, place, and time. Recent and remote memory are within normal limits. Mood and affect are within normal limits. Integumentary (Skin/Wounds)  See inspection of wound(s) below.  There are no other skin areas of palpable concern. See wound center documentation including photos in the [facility] [address] [place] [address] Wound Template made part of this record by reference. Wound Care Documentation:    Wound 09/09/22 Heel Left;Lateral (Active)   Wound Image   02/02/23 1321   Wound Etiology Diabetic Martinez 2 02/02/23 1321   Dressing Status Clean;Dry; Intact 02/02/23 1321   Wound Cleansed Cleansed with saline 02/02/23 1321   Dressing/Treatment Foam 01/26/23 1542   Offloading for Diabetic Foot Ulcers Diabetic shoes/inserts 02/02/23 1321   Wound Length (cm) 0.5 cm 02/02/23 1321   Wound Width (cm) 0.7 cm 02/02/23 1321   Wound Depth (cm) 0.1 cm 02/02/23 1321   Wound Surface Area (cm^2) 0.35 cm^2 02/02/23 1321   Change in Wound Size % (l*w) 94.17 02/02/23 1321   Wound Volume (cm^3) 0.035 cm^3 02/02/23 1321   Wound Healing % 99 02/02/23 1321   Wound Assessment Granulation tissue 02/02/23 1321   Drainage Amount Small 02/02/23 1321   Drainage Description Serosanguinous 02/02/23 1321   Odor None 02/02/23 1321   Joanna-wound Assessment Intact 02/02/23 1321   Margins Attached edges 02/02/23 1321   Wound Thickness Description not for Pressure Injury Full thickness 02/02/23 1321   Number of days: 145       Wound 09/13/22 Buttocks moisture skin damage (Active)   Number of days: 142        Initial WC visit 10/24/2022      F/U on 12/1/2022    Amount and/or Complexity of Data Reviewed and Analyzed:  I reviewed and analyzed all of the unique labs and radiologic studies that are shown below as well as any that are in the HPI, and any that are in the expanded problem list below  *Each unique test, order, or document contributes to the combination of 2 or combination of 3 in Category 1 below. I also independently reviewed radiology images for studies I described above in the HPI or studies I have ordered. For this visit I also reviewed old records and prior notes.     No results for input(s): WBC, HGB, PLT, NA, K, CL, CO2, BUN, CREA, GLU, INR, APTT, ALT, AML, AML, LCAD, PCO2, PO2, HCO3 in the last 72 hours. Invalid input(s): PTP, TBIL, TBILI, CBIL, SGOT, GPT, AP, LPSE, NH4, TROPT, TROIQ,  PH    No results for input(s): INR, APTT, ALT, AML in the last 72 hours. Invalid input(s): PTP, TBIL, TBILI, CBIL, SGOT, GPT, AP, LPSE      Most recent blood counts (CBC) review  Lab Results   Component Value Date    WBC 5.7 01/16/2023    HGB 10.0 (L) 01/16/2023    HCT 30.9 01/16/2023    MCV 94.8 01/16/2023     01/16/2023     Most recent chemistry (BMP) test review   Lab Results   Component Value Date/Time     01/12/2023 08:37 AM    K 4.0 01/12/2023 08:37 AM     01/12/2023 08:37 AM    CO2 28 01/12/2023 08:37 AM    BUN 9 01/12/2023 08:37 AM    CREATININE 1.20 01/16/2023 03:20 PM    GLUCOSE 118 01/12/2023 08:37 AM    CALCIUM 8.9 01/12/2023 08:37 AM      Most recent Liver enzyme test review  Lab Results   Component Value Date    ALT 25 01/16/2023    AST 13 (L) 01/16/2023    ALKPHOS 84 01/16/2023    BILITOT 0.5 01/16/2023     Most recent coagulation (coags) review  @lastinr@  Lab Results   Component Value Date/Time    INR 1.3 07/30/2022 09:53 AM    APTT 63.6 08/02/2022 05:41 AM    APTT 24.3 03/04/2022 03:57 PM       Diabetic assessment  Hemoglobin A1C   Date Value Ref Range Status   12/29/2022 5.6 4.8 - 5.6 % Final       Nutritional assessment screen to assess wound healing ability:  Lab Results   Component Value Date    LABALBU 3.2 01/16/2023     No results found for: TP, ALBR, ALB    Xray Result (most recent):  XR CHEST PORTABLE 12/29/2022    Narrative  CHEST X-RAY, single portable view  12/29/2022    History: Hypoxic. Recent pneumonia. Technique: Single frontal view of the chest.    Comparison: Chest x-ray 9/2/2022    Findings:  A grossly stable left-sided intracardiac device is seen. The cardiac silhouette  is mildly enlarged although decreased in size from the prior comparison study.   The lungs are expanded without evidence for pneumothorax. No consolidative  airspace process or pleural effusion is seen. Only stable basilar reticular  interstitial prominence is seen most consistent with scarring or potential  atelectasis. Impression  1. Only mild cardiomegaly seen which does appear improved from the prior study. Some component of early or mild heart failure is difficult to exclude given the  patient's acute symptoms, however. No potential acute process is otherwise  suggested. CT Result (most recent):  CT HEAD WO CONTRAST 12/29/2022    Narrative  CT HEAD WITHOUT CONTRAST, 12/29/2022    History: Altered mental status. Comparison: None    Technique:   5 mm axial scans from the skull base to the vertex. All CT scans  performed at this facility use one or all of the following: Automated exposure  control, adjustment of the mA and/or kVp according to patient's size, iterative  reconstruction. Findings:  No evidence of intracranial hemorrhage is seen. No abnormal  extra-axial fluid collections are seen. Age related chronic cortical volume  loss is seen. No evidence for acute hydrocephalus is seen. No evidence of  midline shift or herniation is seen. No abnormal edema pattern is seen in a  vascular distribution to suggest large artery infarction. Evaluation with bone windows shows no acute osseous changes. There is a  moderate right mastoid effusion with fluid entering the right middle ear. Impression  1. No acute intracranial process evident by noncontrast CT study of the head. 2.  Moderate right mastoid effusion with fluid entering the right middle ear. This can been indicator for right otomastoiditis. Recommend clinical  correlation. This report was made using voice transcription.  Despite my best efforts to avoid  any, transcription errors may persist. If there is any question about the  accuracy of the report or need for clarification, then please call [phone], or text me through [email or web address] for clarification or correction. 07/06/22    VAS DUP LOWER EXT ARTERIES BILATERAL W JOVANA 07/07/2022  7:57 AM (Final)    Interpretation Summary    Right lower extremity: The JOVANA (ankle-brachial index) is 0.78 and is abnormal. The lower extremity arterial duplex reveals an occlusion of the proximal superficial femoral artery with reconstitution at the distal proximal superficial femoral artery. There is monophasic flow in the GILBERTO, PTA and peroneal arteries at the ankle. The great toe pressure is 64mmHg. Left lower extremity: Right lower extremity: The JOVANA (ankle-brachial index) is 0.60 and is abnormal. The lower extremity arterial duplex reveals an occlusion of the proximal superficial femoral artery with reconstitution at the below knee popliteal artery. There is monophasic flow in the GILBERTO, PTA and peroneal arteries at the ankle. The great toe pressure is 63mmHg. The abdominal aorta was evaluated and measured 2.8cm x 2.9cm at its maximum diameter, no aneurysm visualized on limited evaluation of abdominal aorta. Signed by: Yousuf Arellano MD on 7/7/2022  7:57 AM        Admission date (for inpatients): 2/2/2023   Day of Surgery  * No procedures listed *    Procedure:  Skin Substitute Application    Performed by: Lucio Bruce MD  Ulcer Type: arterial and diabetic  Consent obtained: Yes  Time out taken: Yes    Product Utilized:  PuraPly AM 1.6 sq/cm  Fenestrated at the time of procedure: Yes  Instrument(s) utilized during the procedure: #15 surgical blade    Skin Substitute was Applied to Ulcer Number(s):    Ulcer #: 1  Total Surface Area of Ulcer(s) Covered 0.35 sq/cm  Was the Product Layered  No   Amount of Product Applied 1.6 sq/cm   Amount of Product Wasted 0 sq/cm   Reason for Waste: N/A. Skin Substitute was surgically fixated and secured with Other: silicone dressing.     Wound 09/09/22 Heel Left;Lateral (Active)   Wound Image   02/02/23 1321   Wound Etiology Diabetic Martinez 2 02/02/23 1321   Dressing Status Clean;Dry; Intact 02/02/23 1321   Wound Cleansed Cleansed with saline 02/02/23 1321   Dressing/Treatment Foam 01/26/23 1542   Offloading for Diabetic Foot Ulcers Diabetic shoes/inserts 02/02/23 1321   Wound Length (cm) 0.5 cm 02/02/23 1321   Wound Width (cm) 0.7 cm 02/02/23 1321   Wound Depth (cm) 0.1 cm 02/02/23 1321   Wound Surface Area (cm^2) 0.35 cm^2 02/02/23 1321   Change in Wound Size % (l*w) 94.17 02/02/23 1321   Wound Volume (cm^3) 0.035 cm^3 02/02/23 1321   Wound Healing % 99 02/02/23 1321   Wound Assessment Granulation tissue 02/02/23 1321   Drainage Amount Small 02/02/23 1321   Drainage Description Serosanguinous 02/02/23 1321   Odor None 02/02/23 1321   Joanna-wound Assessment Intact 02/02/23 1321   Margins Attached edges 02/02/23 1321   Wound Thickness Description not for Pressure Injury Full thickness 02/02/23 1321   Number of days: 145       Wound 09/13/22 Buttocks moisture skin damage (Active)   Number of days: 142      Procedural Pain: 0/10   Post Procedural Pain: 0 / 10  Response to Treatment: Patient tolerated procedure well with no complaints of pain. 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 *  Resolved Problems:    * No resolved hospital problems.  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 ([address] 10/24/2022     Priority: High    Antiplatelet or antithrombotic long-term use 10/24/2022     Priority: High     Eliquis and Plavix      Chest pain 12/05/2018     Priority: High    COPD exacerbation ([facility] 12/29/2022     Priority: Medium    Influenza 12/29/2022     Priority: Medium    Lumbar discitis 09/08/2022     Priority: Medium    Psoas muscle abscess ([facility] 09/08/2022     Priority: Medium    Physical debility 08/26/2022     Priority: Medium    General weakness 07/27/2022     Priority: Medium    Acute cystitis without hematuria 07/27/2022     Priority: Medium    Low back pain without sciatica 07/27/2022     Priority: Medium    Back pain 07/27/2022     Priority: Medium    DNI (do not intubate) 07/27/2022     Priority: Medium    Elevated liver enzymes 07/27/2022     Priority: Medium    Anemia 07/27/2022     Priority: Medium    Hyponatremia 07/27/2022     Priority: Medium    ICD (implantable cardioverter-defibrillator) in place 06/16/2022     Priority: Medium    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, vertebra(e), init ([facility] 07/07/2022     Priority: Low     Added automatically from request for surgery [id number]      Atrial fibrillation ([facility] 03/04/2022     Priority: Low    Ventricular tachycardia 03/04/2022     Priority: Low    VT (ventricular tachycardia) 03/04/2022     Priority: Low    Acute metabolic encephalopathy 61/46/1162     Priority: Low    Irregular heart beat 03/02/2021     Priority: Low    PVC's (premature ventricular contractions) 03/02/2021     Priority: Low    Elevated troponin 12/31/2020     Priority: Low    Toe laceration 12/09/2019     Priority: Low    Type 2 diabetes with nephropathy ([facility] 06/17/2019     Priority: Low    Hypoxia 02/08/2019     Priority: Low    Sepsis ([facility] 02/08/2019     Priority: Low    Abnormal nuclear cardiac imaging test 11/27/2018     Priority: Low    Cigarette nicotine dependence in remission 08/20/2018     Priority: Low    Chronic pain of right knee 12/14/2017     Priority: Low    Obstructive sleep apnea 08/11/2017     Priority: Low    Intermittent spinal claudication ([facility] 06/13/2017     Priority: Low    Pulmonary emphysema ([facility] 11/17/2016     Priority: Low    H/O endarterectomy 11/10/2015     Priority: Low    Complicated wound infection 11/10/2015     Priority: Low    Allergic rhinitis 11/10/2015     Priority: Low    Hiatal hernia 11/10/2015     Priority: Low    Diabetic neuropathy ([facility] 11/10/2015     Priority: Low    Osteoarthritis 11/10/2015     Priority: Low    Encounter for long-term (current) drug use 11/10/2015     Priority: Low    Benign neoplasm of colon 11/10/2015     Priority: Low    PAD (peripheral artery disease) ([facility] 11/10/2015     Priority: Low    Asthma 11/10/2015     Priority: Low    Orthostatic hypotension 11/10/2015     Priority: Low    Hyperlipidemia 11/10/2015     Priority: Low    S/P angioplasty with stent 11/10/2015     Priority: Low    COPD (chronic obstructive pulmonary disease) ([facility] 04/10/2015     Priority: Low    Arterial degeneration 11/05/2014     Priority: Low     11/6/14 (Dr Caridad Negron) 1. Bleeding from right groin wound. 2. Disruption of right common femoral artery patch anastomosis and   possible arterial infection. Normocytic anemia 10/23/2014     Priority: Low    MINI (acute kidney injury) ([facility] 09/15/2014     Priority: Low    DM (diabetes mellitus) type II, controlled, with peripheral vascular disorder ([facility] 09/11/2014     Priority: Low    Atherosclerosis of native arteries of extremity with intermittent claudication ([facility] 08/15/2014     Priority: Low     9/11/14 (Dr Caridad Negron) Bilateral common femoral endarterectomies. Personal history of tobacco use, presenting hazards to health 02/12/2013     Priority: Low    Asbestos exposure 02/12/2013     Priority: Low    HTN (hypertension) 10/12/2011     Priority: Low    Severe obesity (BMI 35.0-39. 9) with comorbidity ([facility] 10/12/2011     Priority: Low    Sleep apnea 10/12/2011     Priority: Low    Ischemic cardiomyopathy 10/12/2011     Priority: Low    Coronary artery disease involving native coronary artery of native heart without angina pectoris 10/12/2011     Priority: Low           Plan:     Number and Complexity of Problems addressed and   Risks of complications and/or morbidity of management    Treatment Note please see attached Discharge Instructions  Any procedures done today in the wound center (if documented in a separate note) in [facility] are made part of this record by reference  Written patient dismissal instructions given to patient or POA. 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 He has had an ulcer on the left heel for several months. He has been debilitated following back surgery but is also debilitated from multiple medical problems including cardiac disease, peripheral vascular disease, and diabetes. He is anticoagulated on Eliquis and Plavix. He has had femoral endarterectomies in the past and is now followed by Dr. Jose Miguel Wang. JOVANA in July was abnormal and has not been reevaluated since deterioration of his tissue. He is scheduled for repeat JOVANA in January and we will consult vascular surgery for earlier evaluation. Vascular consultation to follow as needed. He has not been offloading despite having work boots. There is clearly a pressure component. There is no mirror lesion on the right heel and may reflect his discrepancy in blood supply. He does describe some episodes that could be rest pain though it does not drop his legs for improvement. This may also be limited by his debility. We will not do debridement today. We will dress with Betadine and an absorbent pad and follow-up in 1 week.   Offloading was emphasized. He returns on 10/31/2022. He underwent arteriography but is not a candidate for percutaneous intervention. He is being set up for formal bypass in November. Therapy is having him walk on his foot and offloading was again stressed. Current dressing remains Betadine. I will see him back in 1.5 weeks which will be before his bypass surgery. He returns on 11/10/2022. He is improved and has completed antibiotics. Current dressing is Betadine. He will see vascular surgery on Monday to schedule bypass surgery. He returns on 12/1/2022. He was seen by vascular surgery who have set him up for femoral to distal bypass in January, but feel it may not be necessary as he continues to heal.  This is probably secondary to better offloading. He was also seen by ID who also feel that the bypass should be avoided if possible since he is continuing to heal.  I agree with both specialists that he is continuing to heal and we will move forward with adjuncts to healing. We will start with skin substitutes. I will order PuraPly AM for next visit. This would be the best place to start when we cannot perform aggressive debridement due to his vascular status. We will then transition to a growth factor product. Returns on 12/8/2022. There continues to be some improvement. Still some issues with understanding offloading. PuraPly AM #1 was applied today and well-tolerated. We will order a graft for next week. He returns on 12/15/2022. There is some improvement. They have better understanding of offloading. PuraPly AM #2 was applied today and well-tolerated. He returns on 1/19/2023. He missed an appointment because he was hospitalized with bacteremia of unknown source. It was not from his foot. The foot wound has actually improved, likely from improved offloading while being hospitalized. PuraPly AM #3 was applied today and well-tolerated. He returns on 1/26/2023.   Remains well and the wound continues to get smaller using PuraPly. Graft #4 was applied today and well-tolerated. He returns on 2/2/2023. The wound continues to become smaller and more epithelialized. PuraPly AM graft #5 was applied today and well-tolerated. We will order another graft for next week and see him back in 1 week. Wound Care  Orders Placed This Encounter   Procedures    Ambulatory Referral to DME     Referral Priority:   Routine     Referral Reason:   Specialty Services Required     Number of Visits Requested:   1       Return Appointment:   1 week with Porfirio Marley MD      Instructions:   Left Heel:  Cleanse wound and periwound with wound cleanser or normal saline. Cleanse with Vashe. Applied #5 Puraply (1.6 cm disc) ( lot: [id number]   exp: 03/21/2025)  Secured with mepilex transfer, dry gauze, rolled gauze, and coban. Follow with Tubigrip F. Dressing change 1x weekly. Plan for #6 Puraply at next appointment. Offload pressure from heel through use of wheelchair. Level of MDM (2/3 elements below)  Number and Complexity of Problems Addressed Amount and/or Complexity of Data to be Reviewed and Analyzed  *Each unique test, order, or document contributes to the combination of 2 or combination of 3 in Category 1 below. Risk of Complications and/or Morbidity or Mortality of pt Management     93564  55398 SF Minimal  ?1self-limited or minor problem Minimal or none Minimal risk of morbidity from additional diagnostic testing or Rx   70884  61733 Low Low  ? 2or more self-limited or minor problems;    or  ? 1stable chronic illness;    or  ?1acute, uncomplicated illness or injury   Limited  (Must meet the requirements of at least 1 of the 2 categories)  Category 1: Tests and documents   ? Any combination of 2 from the following:  ?Review of prior external note(s) from each unique source*;  ?review of the result(s) of each unique test*;   ?ordering of each unique test*    or   Category 2: Assessment requiring an independent historian(s)  (For the categories of independent interpretation of tests and discussion of management or test interpretation, see moderate or high) Low risk of morbidity from additional diagnostic testing or treatment     97188  85574 Mod Moderate  ? 1or more chronic illnesses with exacerbation, progression, or side effects of treatment;    or  ?2or more stable chronic illnesses;    or  ?1undiagnosed new problem with uncertain prognosis;    or  ?1acute illness with systemic symptoms;    or  ?1acute complicated injury   Moderate  (Must meet the requirements of at least 1 out of 3 categories)  Category 1: Tests, documents, or independent historian(s)  ? Any combination of 3 from the following:   ?Review of prior external note(s) from each unique source*;  ?Review of the result(s) of each unique test*;  ?Ordering of each unique test*;  ?Assessment requiring an independent historian(s)    or  Category 2: Independent interpretation of tests   ? Independent interpretation of a test performed by another physician/other qualified health care professional (not separately reported);     or  Category 3: Discussion of management or test interpretation  ? Discussion of management or test interpretation with external physician/other qualified health care professional/appropriate source (not separately reported)   Moderate risk of morbidity from additional diagnostic testing or treatment  Examples only:  ?Prescription drug management - advanced wound care prescription Rx wound care products  (eg Iodosorb, hydrofera, silvadene, collagen, puracol plus, optiloc, biologics - placenta, Theraskin, Apligraf). Note also that if home health care is involved, they will not apply topical therapies without a prescription/order from physician      ? Decision regarding minor surgery with identified patient or procedure risk factors  ? Decision regarding elective major surgery without identified patient or procedure risk factors ?Diagnosis or treatment significantly limited by social determinants of health       97338  69827 High High  ? 1or more chronic illnesses with severe exacerbation, progression, or side effects of treatment;    or  ?1 acute or chronic illness or injury that poses a threat to life or bodily function   Extensive  (Must meet the requirements of at least 2 out of 3 categories)  Category 1: Tests, documents, or independent historian(s)  ? Any combination of 3 from the following:   ?Review of prior external note(s) from each unique source*;  ?Review of the result(s) of each unique test*;   ?Ordering of each unique test*;   ?Assessment requiring an independent historian(s)    or   Category 2: Independent interpretation of tests   ? Independent interpretation of a test performed by another physician/other qualified health care professional (not separately reported);     or  Category 3: Discussion of management or test interpretation  ? Discussion of management or test interpretation with external physician/other qualified health care professional/appropriate source (not separately reported)   High risk of morbidity from additional diagnostic testing or treatment  Examples only:  ?Drug therapy requiring intensive monitoring for toxicity  ? Decision regarding elective major surgery with identified patient or procedure risk factors  ? Decision regarding emergency major surgery  ? Decision regarding hospitalization  ? Decision not to resuscitate or to de-escalate care because of poor prognosis                 I have personally performed a face-to-face diagnostic evaluation and management  service on this patient. I have independently seen the patient. I have independently obtained the above history from the patient/family. I have independently examined the patient with above findings. I have independently reviewed data/labs for this patient and developed the above plan of care (MDM).       Electronically signed by Lisy Phillip Victor Manuel Pham MD on 2/2/2023 at 2:30 PM

## 2023-02-02 NOTE — DISCHARGE INSTRUCTIONS
Left Heel:  Cleanse wound and periwound with wound cleanser or normal saline. Cleanse with Vashe. Applied #5 Puraply (1.6 cm disc) ( lot: [id number]   exp: 03/21/2025)  Secured with mepilex transfer, dry gauze, rolled gauze, and coban. Follow with Tubigrip F. Dressing change 1x weekly. Plan for #6 Puraply at next appointment. Offload pressure from heel through use of wheelchair.

## 2023-02-02 NOTE — FLOWSHEET NOTE
02/02/23 1321   Left Leg Edema Point of Measurement   Leg circumference 36 cm   Ankle circumference 24 cm   Foot circumference 24 cm   Compression Therapy Tubular elastic support bandage   Wound 09/09/22 Heel Left;Lateral   Date First Assessed/Time First Assessed: 09/09/22 1535   Present on Hospital Admission: Yes  Primary Wound Type: Diabetic Ulcer  Location: Heel  Wound Location Orientation: Left;Lateral   Wound Image    Wound Etiology Diabetic Martinez 2   Dressing Status Clean;Dry; Intact   Wound Cleansed Cleansed with saline   Dressing/Treatment   (Puraply)   Wound Length (cm) 0.5 cm   Wound Width (cm) 0.7 cm   Wound Depth (cm) 0.1 cm   Wound Surface Area (cm^2) 0.35 cm^2   Change in Wound Size % (l*w) 94.17   Wound Volume (cm^3) 0.035 cm^3   Wound Healing % 99   Wound Assessment Granulation tissue   Drainage Amount Small   Drainage Description Serosanguinous   Odor None   Joanna-wound Assessment Intact   Margins Attached edges   Wound Thickness Description not for Pressure Injury Full thickness   Pain Assessment   Pain Assessment None - Denies Pain   Patient is currently taking Plavix and Eliquis.   Patient is offloading with Diabetic Shoes and Custom Insoles

## 2023-02-06 ENCOUNTER — APPOINTMENT (OUTPATIENT)
Dept: GENERAL RADIOLOGY | Age: 78
DRG: 177 | End: 2023-02-06
Payer: MEDICARE

## 2023-02-06 ENCOUNTER — HOSPITAL ENCOUNTER (INPATIENT)
Age: 78
LOS: 2 days | Discharge: HOME HEALTH | DRG: 177 | End: 2023-02-09
Attending: EMERGENCY MEDICINE | Admitting: INTERNAL MEDICINE
Payer: MEDICARE

## 2023-02-06 DIAGNOSIS — J96.01 ACUTE RESPIRATORY FAILURE WITH HYPOXIA (HCC): Primary | ICD-10-CM

## 2023-02-06 DIAGNOSIS — J18.9 PNEUMONIA OF RIGHT LOWER LOBE DUE TO INFECTIOUS ORGANISM: ICD-10-CM

## 2023-02-06 DIAGNOSIS — Z09 HOSPITAL DISCHARGE FOLLOW-UP: ICD-10-CM

## 2023-02-06 DIAGNOSIS — R41.82 ALTERED MENTAL STATUS, UNSPECIFIED ALTERED MENTAL STATUS TYPE: ICD-10-CM

## 2023-02-06 LAB
ALBUMIN SERPL-MCNC: 3.1 G/DL (ref 3.2–4.6)
ALBUMIN/GLOB SERPL: 0.9 (ref 0.4–1.6)
ALP SERPL-CCNC: 78 U/L (ref 50–136)
ALT SERPL-CCNC: 23 U/L (ref 12–65)
ANION GAP SERPL CALC-SCNC: 6 MMOL/L (ref 2–11)
ARTERIAL PATENCY WRIST A: POSITIVE
AST SERPL-CCNC: 10 U/L (ref 15–37)
BASE DEFICIT BLD-SCNC: 2.2 MMOL/L
BASOPHILS # BLD: 0 K/UL (ref 0–0.2)
BASOPHILS NFR BLD: 0 % (ref 0–2)
BDY SITE: ABNORMAL
BILIRUB SERPL-MCNC: 1.2 MG/DL (ref 0.2–1.1)
BUN SERPL-MCNC: 24 MG/DL (ref 8–23)
CALCIUM SERPL-MCNC: 8.5 MG/DL (ref 8.3–10.4)
CHLORIDE SERPL-SCNC: 104 MMOL/L (ref 101–110)
CO2 SERPL-SCNC: 24 MMOL/L (ref 21–32)
CREAT SERPL-MCNC: 1.2 MG/DL (ref 0.8–1.5)
DIFFERENTIAL METHOD BLD: ABNORMAL
EKG ATRIAL RATE: 82 BPM
EKG DIAGNOSIS: NORMAL
EKG P AXIS: 102 DEGREES
EKG P-R INTERVAL: 222 MS
EKG Q-T INTERVAL: 372 MS
EKG QRS DURATION: 108 MS
EKG QTC CALCULATION (BAZETT): 438 MS
EKG R AXIS: 75 DEGREES
EKG T AXIS: 79 DEGREES
EKG VENTRICULAR RATE: 83 BPM
EOSINOPHIL # BLD: 0 K/UL (ref 0–0.8)
EOSINOPHIL NFR BLD: 0 % (ref 0.5–7.8)
ERYTHROCYTE [DISTWIDTH] IN BLOOD BY AUTOMATED COUNT: 17.2 % (ref 11.9–14.6)
GAS FLOW.O2 O2 DELIVERY SYS: ABNORMAL
GLOBULIN SER CALC-MCNC: 3.5 G/DL (ref 2.8–4.5)
GLUCOSE SERPL-MCNC: 193 MG/DL (ref 65–100)
HCO3 BLD-SCNC: 21.9 MMOL/L (ref 22–26)
HCT VFR BLD AUTO: 30.4 % (ref 41.1–50.3)
HGB BLD-MCNC: 10.3 G/DL (ref 13.6–17.2)
IMM GRANULOCYTES # BLD AUTO: 0.1 K/UL (ref 0–0.5)
IMM GRANULOCYTES NFR BLD AUTO: 1 % (ref 0–5)
LACTATE SERPL-SCNC: 1.2 MMOL/L (ref 0.4–2)
LYMPHOCYTES # BLD: 0.5 K/UL (ref 0.5–4.6)
LYMPHOCYTES NFR BLD: 5 % (ref 13–44)
MCH RBC QN AUTO: 31.9 PG (ref 26.1–32.9)
MCHC RBC AUTO-ENTMCNC: 33.9 G/DL (ref 31.4–35)
MCV RBC AUTO: 94.1 FL (ref 82–102)
MONOCYTES # BLD: 0.6 K/UL (ref 0.1–1.3)
MONOCYTES NFR BLD: 7 % (ref 4–12)
NEUTS SEG # BLD: 8.4 K/UL (ref 1.7–8.2)
NEUTS SEG NFR BLD: 87 % (ref 43–78)
NRBC # BLD: 0 K/UL (ref 0–0.2)
NT PRO BNP: 616 PG/ML
O2/TOTAL GAS SETTING VFR VENT: 60 %
PCO2 BLD: 34 MMHG (ref 35–45)
PH BLD: 7.42 (ref 7.35–7.45)
PLATELET # BLD AUTO: 174 K/UL (ref 150–450)
PMV BLD AUTO: 10.9 FL (ref 9.4–12.3)
PO2 BLD: 80 MMHG (ref 75–100)
POTASSIUM SERPL-SCNC: 4.3 MMOL/L (ref 3.5–5.1)
PROCALCITONIN SERPL-MCNC: 0.22 NG/ML (ref 0–0.49)
PROT SERPL-MCNC: 6.6 G/DL (ref 6.3–8.2)
RBC # BLD AUTO: 3.23 M/UL (ref 4.23–5.6)
SAO2 % BLD: 96 % (ref 95–98)
SERVICE CMNT-IMP: ABNORMAL
SERVICE CMNT-IMP: ABNORMAL
SODIUM SERPL-SCNC: 134 MMOL/L (ref 133–143)
SPECIMEN TYPE: ABNORMAL
TROPONIN I SERPL HS-MCNC: 20.7 PG/ML (ref 0–14)
WBC # BLD AUTO: 9.7 K/UL (ref 4.3–11.1)

## 2023-02-06 PROCEDURE — 83880 ASSAY OF NATRIURETIC PEPTIDE: CPT

## 2023-02-06 PROCEDURE — 84484 ASSAY OF TROPONIN QUANT: CPT

## 2023-02-06 PROCEDURE — 83605 ASSAY OF LACTIC ACID: CPT

## 2023-02-06 PROCEDURE — 71045 X-RAY EXAM CHEST 1 VIEW: CPT

## 2023-02-06 PROCEDURE — 6360000002 HC RX W HCPCS: Performed by: EMERGENCY MEDICINE

## 2023-02-06 PROCEDURE — 84145 PROCALCITONIN (PCT): CPT

## 2023-02-06 PROCEDURE — 93005 ELECTROCARDIOGRAM TRACING: CPT | Performed by: EMERGENCY MEDICINE

## 2023-02-06 PROCEDURE — 99285 EMERGENCY DEPT VISIT HI MDM: CPT

## 2023-02-06 PROCEDURE — 6370000000 HC RX 637 (ALT 250 FOR IP): Performed by: EMERGENCY MEDICINE

## 2023-02-06 PROCEDURE — 85025 COMPLETE CBC W/AUTO DIFF WBC: CPT

## 2023-02-06 PROCEDURE — 96365 THER/PROPH/DIAG IV INF INIT: CPT

## 2023-02-06 PROCEDURE — 87040 BLOOD CULTURE FOR BACTERIA: CPT

## 2023-02-06 PROCEDURE — 80053 COMPREHEN METABOLIC PANEL: CPT

## 2023-02-06 PROCEDURE — 36600 WITHDRAWAL OF ARTERIAL BLOOD: CPT

## 2023-02-06 PROCEDURE — 2580000003 HC RX 258: Performed by: EMERGENCY MEDICINE

## 2023-02-06 PROCEDURE — 96367 TX/PROPH/DG ADDL SEQ IV INF: CPT

## 2023-02-06 PROCEDURE — 82803 BLOOD GASES ANY COMBINATION: CPT

## 2023-02-06 RX ORDER — IBUPROFEN 400 MG/1
400 TABLET ORAL
Status: COMPLETED | OUTPATIENT
Start: 2023-02-06 | End: 2023-02-06

## 2023-02-06 RX ORDER — 0.9 % SODIUM CHLORIDE 0.9 %
1000 INTRAVENOUS SOLUTION INTRAVENOUS
Status: COMPLETED | OUTPATIENT
Start: 2023-02-06 | End: 2023-02-07

## 2023-02-06 RX ADMIN — VANCOMYCIN HYDROCHLORIDE 2000 MG: 100 INJECTION, POWDER, LYOPHILIZED, FOR SOLUTION INTRAVENOUS at 23:40

## 2023-02-06 RX ADMIN — IBUPROFEN 400 MG: 400 TABLET, FILM COATED ORAL at 22:45

## 2023-02-06 RX ADMIN — SODIUM CHLORIDE 1000 ML: 9 INJECTION, SOLUTION INTRAVENOUS at 22:44

## 2023-02-06 RX ADMIN — CEFTRIAXONE 1000 MG: 1 INJECTION, POWDER, FOR SOLUTION INTRAMUSCULAR; INTRAVENOUS at 22:45

## 2023-02-07 PROBLEM — J15.9 HOSPITAL-ACQUIRED BACTERIAL PNEUMONIA: Status: ACTIVE | Noted: 2023-02-07

## 2023-02-07 PROBLEM — J96.01 ACUTE RESPIRATORY FAILURE WITH HYPOXIA (HCC): Status: ACTIVE | Noted: 2023-02-07

## 2023-02-07 LAB
BACTERIA SPEC CULT: NORMAL
FLUAV RNA SPEC QL NAA+PROBE: NOT DETECTED
FLUBV RNA SPEC QL NAA+PROBE: NOT DETECTED
GLUCOSE BLD STRIP.AUTO-MCNC: 123 MG/DL (ref 65–100)
GLUCOSE BLD STRIP.AUTO-MCNC: 149 MG/DL (ref 65–100)
GLUCOSE BLD STRIP.AUTO-MCNC: 182 MG/DL (ref 65–100)
GLUCOSE BLD STRIP.AUTO-MCNC: 186 MG/DL (ref 65–100)
LACTATE SERPL-SCNC: 1.2 MMOL/L (ref 0.4–2)
SARS-COV-2 RDRP RESP QL NAA+PROBE: NOT DETECTED
SERVICE CMNT-IMP: ABNORMAL
SERVICE CMNT-IMP: NORMAL
SOURCE: NORMAL

## 2023-02-07 PROCEDURE — 87635 SARS-COV-2 COVID-19 AMP PRB: CPT

## 2023-02-07 PROCEDURE — 2580000003 HC RX 258: Performed by: INTERNAL MEDICINE

## 2023-02-07 PROCEDURE — 94640 AIRWAY INHALATION TREATMENT: CPT

## 2023-02-07 PROCEDURE — 5A09357 ASSISTANCE WITH RESPIRATORY VENTILATION, LESS THAN 24 CONSECUTIVE HOURS, CONTINUOUS POSITIVE AIRWAY PRESSURE: ICD-10-PCS | Performed by: INTERNAL MEDICINE

## 2023-02-07 PROCEDURE — 6360000002 HC RX W HCPCS: Performed by: HOSPITALIST

## 2023-02-07 PROCEDURE — 83605 ASSAY OF LACTIC ACID: CPT

## 2023-02-07 PROCEDURE — 94761 N-INVAS EAR/PLS OXIMETRY MLT: CPT

## 2023-02-07 PROCEDURE — 97161 PT EVAL LOW COMPLEX 20 MIN: CPT

## 2023-02-07 PROCEDURE — 36415 COLL VENOUS BLD VENIPUNCTURE: CPT

## 2023-02-07 PROCEDURE — 97535 SELF CARE MNGMENT TRAINING: CPT

## 2023-02-07 PROCEDURE — 94660 CPAP INITIATION&MGMT: CPT

## 2023-02-07 PROCEDURE — 97116 GAIT TRAINING THERAPY: CPT

## 2023-02-07 PROCEDURE — 6370000000 HC RX 637 (ALT 250 FOR IP): Performed by: INTERNAL MEDICINE

## 2023-02-07 PROCEDURE — 87641 MR-STAPH DNA AMP PROBE: CPT

## 2023-02-07 PROCEDURE — 96366 THER/PROPH/DIAG IV INF ADDON: CPT

## 2023-02-07 PROCEDURE — 87502 INFLUENZA DNA AMP PROBE: CPT

## 2023-02-07 PROCEDURE — 87040 BLOOD CULTURE FOR BACTERIA: CPT

## 2023-02-07 PROCEDURE — 6370000000 HC RX 637 (ALT 250 FOR IP): Performed by: HOSPITALIST

## 2023-02-07 PROCEDURE — 97165 OT EVAL LOW COMPLEX 30 MIN: CPT

## 2023-02-07 PROCEDURE — 2500000003 HC RX 250 WO HCPCS: Performed by: INTERNAL MEDICINE

## 2023-02-07 PROCEDURE — 82962 GLUCOSE BLOOD TEST: CPT

## 2023-02-07 PROCEDURE — 1100000000 HC RM PRIVATE

## 2023-02-07 PROCEDURE — 2580000003 HC RX 258: Performed by: HOSPITALIST

## 2023-02-07 PROCEDURE — 97530 THERAPEUTIC ACTIVITIES: CPT

## 2023-02-07 PROCEDURE — 2700000000 HC OXYGEN THERAPY PER DAY

## 2023-02-07 RX ORDER — INSULIN LISPRO 100 [IU]/ML
0-8 INJECTION, SOLUTION INTRAVENOUS; SUBCUTANEOUS
Status: DISCONTINUED | OUTPATIENT
Start: 2023-02-07 | End: 2023-02-09 | Stop reason: HOSPADM

## 2023-02-07 RX ORDER — ONDANSETRON 2 MG/ML
4 INJECTION INTRAMUSCULAR; INTRAVENOUS EVERY 6 HOURS PRN
Status: DISCONTINUED | OUTPATIENT
Start: 2023-02-07 | End: 2023-02-09 | Stop reason: HOSPADM

## 2023-02-07 RX ORDER — FAMOTIDINE 20 MG/1
10 TABLET, FILM COATED ORAL DAILY PRN
Status: DISCONTINUED | OUTPATIENT
Start: 2023-02-07 | End: 2023-02-09 | Stop reason: HOSPADM

## 2023-02-07 RX ORDER — BISACODYL 10 MG
10 SUPPOSITORY, RECTAL RECTAL DAILY PRN
Status: DISCONTINUED | OUTPATIENT
Start: 2023-02-07 | End: 2023-02-09 | Stop reason: HOSPADM

## 2023-02-07 RX ORDER — ACETAMINOPHEN 325 MG/1
650 TABLET ORAL EVERY 6 HOURS PRN
Status: DISCONTINUED | OUTPATIENT
Start: 2023-02-07 | End: 2023-02-09 | Stop reason: HOSPADM

## 2023-02-07 RX ORDER — INSULIN LISPRO 100 [IU]/ML
0-4 INJECTION, SOLUTION INTRAVENOUS; SUBCUTANEOUS NIGHTLY
Status: DISCONTINUED | OUTPATIENT
Start: 2023-02-07 | End: 2023-02-09 | Stop reason: HOSPADM

## 2023-02-07 RX ORDER — CARVEDILOL 12.5 MG/1
25 TABLET ORAL 2 TIMES DAILY WITH MEALS
Status: DISCONTINUED | OUTPATIENT
Start: 2023-02-07 | End: 2023-02-07

## 2023-02-07 RX ORDER — SODIUM CHLORIDE 0.9 % (FLUSH) 0.9 %
5-40 SYRINGE (ML) INJECTION PRN
Status: DISCONTINUED | OUTPATIENT
Start: 2023-02-07 | End: 2023-02-09 | Stop reason: HOSPADM

## 2023-02-07 RX ORDER — AMIODARONE HYDROCHLORIDE 100 MG/1
200 TABLET ORAL DAILY
Status: DISCONTINUED | OUTPATIENT
Start: 2023-02-07 | End: 2023-02-09 | Stop reason: HOSPADM

## 2023-02-07 RX ORDER — ROSUVASTATIN CALCIUM 20 MG/1
10 TABLET, COATED ORAL NIGHTLY
Status: DISCONTINUED | OUTPATIENT
Start: 2023-02-07 | End: 2023-02-09 | Stop reason: HOSPADM

## 2023-02-07 RX ORDER — SODIUM CHLORIDE 9 MG/ML
INJECTION, SOLUTION INTRAVENOUS PRN
Status: DISCONTINUED | OUTPATIENT
Start: 2023-02-07 | End: 2023-02-09 | Stop reason: HOSPADM

## 2023-02-07 RX ORDER — VALSARTAN 80 MG/1
80 TABLET ORAL DAILY
Status: DISCONTINUED | OUTPATIENT
Start: 2023-02-07 | End: 2023-02-09 | Stop reason: HOSPADM

## 2023-02-07 RX ORDER — POTASSIUM CHLORIDE 7.45 MG/ML
10 INJECTION INTRAVENOUS PRN
Status: DISCONTINUED | OUTPATIENT
Start: 2023-02-07 | End: 2023-02-09

## 2023-02-07 RX ORDER — CLOPIDOGREL BISULFATE 75 MG/1
75 TABLET ORAL DAILY
Status: DISCONTINUED | OUTPATIENT
Start: 2023-02-07 | End: 2023-02-09 | Stop reason: HOSPADM

## 2023-02-07 RX ORDER — FUROSEMIDE 20 MG/1
20 TABLET ORAL DAILY
Status: DISCONTINUED | OUTPATIENT
Start: 2023-02-07 | End: 2023-02-09 | Stop reason: HOSPADM

## 2023-02-07 RX ORDER — ACETAMINOPHEN 650 MG/1
650 SUPPOSITORY RECTAL EVERY 6 HOURS PRN
Status: DISCONTINUED | OUTPATIENT
Start: 2023-02-07 | End: 2023-02-09 | Stop reason: HOSPADM

## 2023-02-07 RX ORDER — BUDESONIDE 0.5 MG/2ML
0.5 INHALANT ORAL 2 TIMES DAILY
Status: DISCONTINUED | OUTPATIENT
Start: 2023-02-07 | End: 2023-02-09 | Stop reason: HOSPADM

## 2023-02-07 RX ORDER — ALBUTEROL SULFATE 2.5 MG/3ML
2.5 SOLUTION RESPIRATORY (INHALATION) EVERY 6 HOURS PRN
Status: DISCONTINUED | OUTPATIENT
Start: 2023-02-07 | End: 2023-02-09 | Stop reason: HOSPADM

## 2023-02-07 RX ORDER — LEVALBUTEROL INHALATION SOLUTION 0.63 MG/3ML
0.63 SOLUTION RESPIRATORY (INHALATION)
Status: DISCONTINUED | OUTPATIENT
Start: 2023-02-07 | End: 2023-02-09 | Stop reason: HOSPADM

## 2023-02-07 RX ORDER — POTASSIUM CHLORIDE 20 MEQ/1
40 TABLET, EXTENDED RELEASE ORAL PRN
Status: DISCONTINUED | OUTPATIENT
Start: 2023-02-07 | End: 2023-02-09

## 2023-02-07 RX ORDER — MAGNESIUM HYDROXIDE/ALUMINUM HYDROXICE/SIMETHICONE 120; 1200; 1200 MG/30ML; MG/30ML; MG/30ML
30 SUSPENSION ORAL EVERY 6 HOURS PRN
Status: DISCONTINUED | OUTPATIENT
Start: 2023-02-07 | End: 2023-02-09 | Stop reason: HOSPADM

## 2023-02-07 RX ORDER — GUAIFENESIN 600 MG/1
600 TABLET, EXTENDED RELEASE ORAL 2 TIMES DAILY
Status: DISCONTINUED | OUTPATIENT
Start: 2023-02-07 | End: 2023-02-09 | Stop reason: HOSPADM

## 2023-02-07 RX ORDER — SODIUM CHLORIDE 0.9 % (FLUSH) 0.9 %
5-40 SYRINGE (ML) INJECTION EVERY 12 HOURS SCHEDULED
Status: DISCONTINUED | OUTPATIENT
Start: 2023-02-07 | End: 2023-02-09 | Stop reason: HOSPADM

## 2023-02-07 RX ORDER — ONDANSETRON 4 MG/1
4 TABLET, ORALLY DISINTEGRATING ORAL EVERY 8 HOURS PRN
Status: DISCONTINUED | OUTPATIENT
Start: 2023-02-07 | End: 2023-02-09 | Stop reason: HOSPADM

## 2023-02-07 RX ORDER — POLYETHYLENE GLYCOL 3350 17 G/17G
17 POWDER, FOR SOLUTION ORAL DAILY PRN
Status: DISCONTINUED | OUTPATIENT
Start: 2023-02-07 | End: 2023-02-09 | Stop reason: HOSPADM

## 2023-02-07 RX ORDER — MAGNESIUM SULFATE IN WATER 40 MG/ML
2000 INJECTION, SOLUTION INTRAVENOUS PRN
Status: DISCONTINUED | OUTPATIENT
Start: 2023-02-07 | End: 2023-02-09

## 2023-02-07 RX ORDER — CARVEDILOL 3.12 MG/1
6.25 TABLET ORAL 2 TIMES DAILY WITH MEALS
Status: DISCONTINUED | OUTPATIENT
Start: 2023-02-08 | End: 2023-02-09 | Stop reason: HOSPADM

## 2023-02-07 RX ORDER — LATANOPROST 50 UG/ML
1 SOLUTION/ DROPS OPHTHALMIC NIGHTLY
Status: DISCONTINUED | OUTPATIENT
Start: 2023-02-07 | End: 2023-02-09 | Stop reason: HOSPADM

## 2023-02-07 RX ADMIN — GUAIFENESIN 600 MG: 600 TABLET ORAL at 21:43

## 2023-02-07 RX ADMIN — BUDESONIDE 500 MCG: 0.5 INHALANT RESPIRATORY (INHALATION) at 19:55

## 2023-02-07 RX ADMIN — GUAIFENESIN 600 MG: 600 TABLET ORAL at 14:28

## 2023-02-07 RX ADMIN — CLOPIDOGREL BISULFATE 75 MG: 75 TABLET ORAL at 08:31

## 2023-02-07 RX ADMIN — CEFEPIME 2000 MG: 2 INJECTION, POWDER, FOR SOLUTION INTRAVENOUS at 08:27

## 2023-02-07 RX ADMIN — DOXYCYCLINE 100 MG: 100 INJECTION, POWDER, LYOPHILIZED, FOR SOLUTION INTRAVENOUS at 03:26

## 2023-02-07 RX ADMIN — ROSUVASTATIN CALCIUM 10 MG: 20 TABLET, COATED ORAL at 21:43

## 2023-02-07 RX ADMIN — FUROSEMIDE 20 MG: 20 TABLET ORAL at 08:31

## 2023-02-07 RX ADMIN — APIXABAN 5 MG: 5 TABLET, FILM COATED ORAL at 08:33

## 2023-02-07 RX ADMIN — VANCOMYCIN HYDROCHLORIDE 1500 MG: 10 INJECTION, POWDER, LYOPHILIZED, FOR SOLUTION INTRAVENOUS at 22:12

## 2023-02-07 RX ADMIN — SODIUM CHLORIDE, PRESERVATIVE FREE 10 ML: 5 INJECTION INTRAVENOUS at 08:22

## 2023-02-07 RX ADMIN — APIXABAN 5 MG: 5 TABLET, FILM COATED ORAL at 21:43

## 2023-02-07 RX ADMIN — MOMETASONE FUROATE AND FORMOTEROL FUMARATE DIHYDRATE 2 PUFF: 100; 5 AEROSOL RESPIRATORY (INHALATION) at 07:23

## 2023-02-07 RX ADMIN — LATANOPROST 1 DROP: 50 SOLUTION OPHTHALMIC at 21:42

## 2023-02-07 RX ADMIN — LEVALBUTEROL HYDROCHLORIDE 0.63 MG: 0.63 SOLUTION RESPIRATORY (INHALATION) at 14:57

## 2023-02-07 RX ADMIN — CEFEPIME 2000 MG: 2 INJECTION, POWDER, FOR SOLUTION INTRAVENOUS at 16:15

## 2023-02-07 RX ADMIN — SODIUM CHLORIDE, PRESERVATIVE FREE 10 ML: 5 INJECTION INTRAVENOUS at 21:43

## 2023-02-07 RX ADMIN — LEVALBUTEROL HYDROCHLORIDE 0.63 MG: 0.63 SOLUTION RESPIRATORY (INHALATION) at 19:55

## 2023-02-07 ASSESSMENT — PAIN SCALES - GENERAL: PAINLEVEL_OUTOF10: 0

## 2023-02-07 NOTE — PROGRESS NOTES
TRANSFER - IN REPORT:    Verbal report received from Radha chase Zaira Rg  being received from ED for routine progression of patient care      Report consisted of patient's Situation, Background, Assessment and   Recommendations(SBAR). Information from the following report(s) ED SBAR was reviewed with the receiving nurse. Opportunity for questions and clarification was provided. Assessment completed upon patient's arrival to unit and care assumed.

## 2023-02-07 NOTE — PROGRESS NOTES
ACUTE OCCUPATIONAL THERAPY GOALS:   (Developed with and agreed upon by patient and/or caregiver.)  1. Patient will complete lower body bathing and dressing with MINIMAL ASSIST and adaptive equipment as needed. 2. Patient will complete toileting with SUPERVISION. 3. Patient will complete grooming ADL standing at sink with SUPERVISION. 4. Patient will tolerate 25 minutes of OT treatment with SELF DIRECTED rest breaks to increase activity tolerance for ADLs. 5. Patient will complete functional transfers with SUPERVISION and adaptive equipment as needed. 6. Patient will verbalize 3 energy conservation techniques to utilize during ADL/IADL. Timeframe: 7 visits     OCCUPATIONAL THERAPY Initial Assessment and Daily Note       OT Visit Days: 1  Acknowledge Orders  Time  OT Charge Capture  Rehab Caseload Tracker      Amanda Weir is a 65 y.o. male   PRIMARY DIAGNOSIS: Acute respiratory failure with hypoxia (HCC)  Acute respiratory failure with hypoxia (HCC) [J96.01]  Altered mental status, unspecified altered mental status type [R41.82]  Pneumonia of right lower lobe due to infectious organism [J18.9]       Reason for Referral: Generalized Muscle Weakness (M62.81)  Difficulty in walking, Not elsewhere classified (R26.2)  Other abnormalities of gait and mobility (R26.89)  Inpatient: Payor: MEDICARE / Plan: MEDICARE PART A AND B / Product Type: *No Product type* /     ASSESSMENT:     REHAB RECOMMENDATIONS:   Recommendation to date pending progress:  Setting:  Home Health Therapy    Equipment:    None--pt has SC, rollator and w/c at home     ASSESSMENT:  Mr. Ирина Moscoso is a 64 y/o male presents with acute respiratory failure, AMS and lethargy. AMS and lethargy have since resolved. At baseline pt lives with his wife, needs assistance with LE dressing/bathing otherwise mod I for ADLs and uses a rollator for ambulation. Pt does not wear home O2 and is currently on 4L O2 NC.  Today pt presents with decreased activity tolerance, balance, strength and mobility impacting ADLs. Pt received sitting in chair and overall CGA RW for functional transfers and in-room mobility. Pt tolerated standing at sink for grooming ADLs with CGA. Pt started off with good posture, then over time pt posture became poor, forward leaning and with decreased balance. Pt SOB with activity, however O2 sats >90% throughout. Pt is currently functioning below baseline and would benefit from skilled OT services to address OT goals and plan of care. Rec HHOT at d/c.       [facility] [address] [id number] AM-PAC 6 Clicks Daily Activity Inpatient Short Form:    AM-PAC Daily Activity - Inpatient   How much help is needed for putting on and taking off regular lower body clothing?: A Lot  How much help is needed for bathing (which includes washing, rinsing, drying)?: A Lot  How much help is needed for toileting (which includes using toilet, bedpan, or urinal)?: A Lot  How much help is needed for putting on and taking off regular upper body clothing?: A Little  How much help is needed for taking care of personal grooming?: A Little  How much help for eating meals?: None  AM-[facility] Inpatient Daily Activity Raw Score: 16  AM-PAC Inpatient ADL T-Scale Score : 35.96  ADL Inpatient CMS 0-100% Score: 53.32  ADL Inpatient CMS G-Code Modifier : CK           SUBJECTIVE:     Mr. Chelsea King states, \"I've been using the tobacco since I was a baby\"     Social/Functional Lives With: Spouse  Type of Home: House  Home Layout: Two level  Home Access: Ramped entrance  Bathroom Shower/Tub: Tub/Shower unit  Bathroom Toilet: Standard  Bathroom Equipment: Grab bars in shower, Toilet raiser  Bathroom Accessibility: Accessible  Home Equipment: [address] Help From: Family  ADL Assistance: Independent  Homemaking Assistance: Independent  Homemaking Responsibilities: Yes  Ambulation Assistance: Independent  Transfer Assistance: Independent  Active : No  Patient's  Info:  Wife  Mode of Transportation: Car  Occupation: Retired    OBJECTIVE:     [place] / [place] / [place]: None    RESTRICTIONS/PRECAUTIONS:  Restrictions/Precautions: Fall Risk    PAIN: VITALS / O2:   Pre Treatment:   Pain Assessment: None - Denies Pain      Post Treatment: no c/o pain, resting comfortably in bedside chair       Vitals          Oxygen  O2 Therapy: Oxygen  O2 Device: Nasal cannula  O2 Flow Rate (L/min): 4 L/min  SpO2: 93 %         GROSS EVALUATION: INTACT IMPAIRED   (See Comments)   UE AROM [x] []   UE PROM [x] []   Strength []  Generally decreased but functional      Posture / Balance [] Posture: Fair  Sitting - Static: Good  Sitting - Dynamic: Good, -  Standing - Static: Fair, +  Standing - Dynamic: Fair, -   Sensation [x]     Coordination [x]       Tone [x]       Edema [x]    Activity Tolerance [] Patient limited by fatigue, Patient Tolerated treatment well  SOB with activity   Hand Dominance R [] L []      COGNITION/  PERCEPTION: INTACT IMPAIRED   (See Comments)   Orientation [x]     Vision [x]     Hearing [x]     Cognition  []  Decreased safety awareness   Perception [x]       MOBILITY: I Mod I S SBA CGA Min Mod Max Total  NT x2 Comments: pt received sitting in chair   Bed Mobility    Rolling [] [] [] [] [] [] [] [] [] [x] []    Supine to Sit [] [] [] [] [] [] [] [] [] [x] []    Scooting [] [] [] [] [] [] [] [] [] [x] []    Sit to Supine [] [] [] [] [] [] [] [] [] [x] []    Transfers    Sit to Stand [] [] [] [] [x] [] [] [] [] [] [] RW   Bed to Chair [] [] [] [] [x] [] [] [] [] [] [] RW   Stand to Sit [] [] [] [] [x] [] [] [] [] [] [] RW   Tub/Shower [] [] [] [] [] [] [] [] [] [x] []     Toilet [] [] [] [] [] [] [] [] [] [x] []      [] [] [] [] [] [] [] [] [] [x] []    I=Independent, Mod I=Modified Independent, S=Supervision/Setup, SBA=Standby Assistance, CGA=Contact Guard Assistance, Min=Minimal Assistance, Mod=Moderate Assistance, Max=Maximal Assistance, Total=Total Assistance, NT=Not Tested    ACTIVITIES OF DAILY LIVING: I Mod I S SBA CGA Min Mod Max Total NT Comments   BASIC ADLs:              Upper Body Bathing  [] [] [] [] [] [] [] [] [] [x]    Lower Body Bathing [] [] [] [] [] [] [] [] [] [x]    Toileting [] [] [] [] [] [] [] [] [] [x]    Upper Body Dressing [] [] [] [] [] [] [] [] [] [x]    Lower Body Dressing [] [] [] [] [] [] [] [] [] [x]    Feeding [] [] [] [] [] [] [] [] [] [x]    Grooming [] [] [] [] [x] [x] [] [] [] [] CGA initially brushing teeth then progressed to min-mod A with washing face due to fatigue, poor balance, forward leaning    Personal Device Care [] [] [] [] [] [] [] [] [] [x]    Functional Mobility [] [] [] [] [x] [] [] [] [] [] RW   I=Independent, Mod I=Modified Independent, S=Supervision/Setup, SBA=Standby Assistance, CGA=Contact Guard Assistance, Min=Minimal Assistance, Mod=Moderate Assistance, Max=Maximal Assistance, Total=Total Assistance, NT=Not Tested    PLAN:   FREQUENCY/DURATION   OT Plan of Care: 3 times/week for duration of hospital stay or until stated goals are met, whichever comes first.    PROBLEM LIST:   (Skilled intervention is medically necessary to address:)  Decreased ADL/Functional Activities  Decreased Activity Tolerance  Decreased Balance  Decreased Gait Ability  Decreased Safety Awareness  Decreased Strength  Decreased Transfer Abilities   INTERVENTIONS PLANNED:  (Benefits and precautions of occupational therapy have been discussed with the patient.)  Self Care Training  Therapeutic Activity  Therapeutic Exercise/HEP  Neuromuscular Re-education  Manual Therapy  Education         TREATMENT:     EVALUATION: LOW COMPLEXITY: (Untimed Charge)    TREATMENT:   Self Care (15 minutes): Patient participated in grooming ADLs in standing with moderate verbal, manual, and tactile cueing to increase independence, decrease assistance required, increase activity tolerance, and increase safety awareness.  Patient also participated in functional mobility, functional transfer, and adaptive equipment training to increase independence, decrease assistance required, increase activity tolerance, and increase safety awareness.      TREATMENT GRID:  N/A    AFTER TREATMENT PRECAUTIONS: Call light within reach, Chair, Needs within reach, and RN notified    INTERDISCIPLINARY COLLABORATION:  RN/ PCT and OT/ KATE    EDUCATION:  Education Given To: Patient  Education Provided: Role of Therapy;Plan of Care  Education Method: Verbal  Barriers to Learning: None  Education Outcome: Verbalized understanding    TOTAL TREATMENT DURATION AND TIME:  Time In: 6229  Time Out: 176 Jayden Lugo  Minutes: 6[address] Brice Juan M, [place]

## 2023-02-07 NOTE — PROGRESS NOTES
Hospitalist Progress Note   Admit Date:  2023 10:09 PM   Name:  Elsa Morgan   Age:  65 y.o. Sex:  male  :  1945   MRN:  [id number]   Room:      Presenting Complaint: Fever     Reason(s) for Admission: Acute respiratory failure with hypoxia ([facility] [address] [J96.01]  Altered mental status, unspecified altered mental status type [R41.82]  Pneumonia of right lower lobe due to infectious organism [J18.9]     Hospital Course: Elsa Morgan is a 65 y.o. male with medical history of ischemic CM, chronic sCHF, SHERI, PAD, COPD, HTN, DM2, HLD and atrial fib admitted on  with acute respiratory failure secondary to right lower lobe hospital-acquired pneumonia. Fever resolved  and patient was doing well but then became lethargic and sleeping more. Upon EMS evaluation, patient was hypoxic to 81% on room air requiring nonrebreather in route to the hospital.      In the ER patient was febrile to 102.2 °F, hypoxic on room air with saturation of  81%. Laboratory work-up notable for proBNP of 616, hemoglobin 10.3. Chest x-ray with right lower lobe pneumonia. Procalcitonin 0.22    Subjective & 24hr Events (23): Seen at bedside, resting in bed comfortably. He is alert and awake, AOx3, on 5 L nasal cannula. Patient denies any worsening shortness of breath, cough or congestion. Denies any nausea vomiting, chest pain, palpitations, abdominal pain, diarrhea. No other overnight events reported by nursing staff. ROS:  10 point review of system is negative except for what mentioned above. Assessment & Plan:     Acute hypoxic respiratory failure   Right lower lobe hospital-acquired pneumonia  -  Patient is currently on 5 L nasal cannula. Titrate for SPO2 greater than 92%. Wean off further as able. We will start patient on Xopenex nebs and Pulmicort nebs.   Added Mucinex 600 mg p.o. twice daily  Switched IV Rocephin and doxycycline to broad-spectrum IV cefepime and vancomycin, D1.  COVID-19 and influenza negative. Blood culture negative to date       Acute metabolic encephalopathy likely due to hypoxia and underlying infection  2/7-  Patient is alert and awake, mentation is at baseline. Anemia  Hemoglobin at 10.3 which appears to be his baseline  - Monitor hemoglobin and transfuse as needed        CAD // chronic sCHF // Afib // HTN  - Con't Eliquis, amio, Coreg, plavix  - hold diovan given normo tension     COPD: continue with nebs, not in exacerbation  SHERI: on cpap at night time  T2DM: hold oral agent and start ISS    Consulting wound care nurse to evaluate for left lower extremity wound dressing. Patient follows up with Dr. Meli Jones team as outpatient. Anticipated discharge needs:      Pending clinical course. Diet:  ADULT DIET; Regular; 4 carb choices (60 gm/meal)  DVT PPx: eliquis  Code status: Full Code    I spent 38 minutes of time today caring for this patient on the unit nearby or at bedside. Time may include history, exam, counseling/communication, documentation, ordering and reviewing tests, and conferring with other members of the care team.     Hospital Problems:  Principal Problem:    Acute respiratory failure with hypoxia ([address]  Active Problems:    ICD (implantable cardioverter-defibrillator) in place    Anemia    Hospital-acquired bacterial pneumonia    Atrial fibrillation (HCC)    HTN (hypertension)    COPD (chronic obstructive pulmonary disease) ([address]    Diabetic neuropathy ([address]    Coronary artery disease involving native coronary artery of native heart without angina pectoris    PAD (peripheral artery disease) ([address]    S/P angioplasty with stent    DM (diabetes mellitus) type II, controlled, with peripheral vascular disorder ([address]  Resolved Problems:    * No resolved hospital problems.  *      Objective:   Patient Vitals for the past 24 hrs:   Temp Pulse Resp BP SpO2   02/07/23 0314 97.9 °F (36.6 °C) 73 19 100/82 93 %   02/07/23 0245 -- -- -- Daniel Manning 96/59 93 %   02/07/23 0230 -- -- -- (!) 99/58 93 %   02/07/23 0215 -- -- -- (!) 91/53 93 %   02/07/23 0130 99 °F (37.2 °C) 65 -- (!) 114/57 96 %   02/07/23 0115 -- 66 -- 106/61 95 %   02/07/23 0100 -- 66 17 108/61 94 %   02/07/23 0045 -- 65 23 (!) 110/57 97 %   02/07/23 0030 -- 66 22 (!) 118/53 95 %   02/07/23 0015 -- 69 23 (!) 126/59 96 %   02/06/23 2214 (!) 102.2 °F (39 °C) 80 18 126/64 (!) 81 %       Oxygen Therapy  SpO2: 93 %  Pulse via Oximetry: 73 beats per minute  O2 Device: PAP (positive airway pressure) (cpap)  O2 Flow Rate (L/min): 5 L/min (bled into cpap)  Blood Gas  Performed?: Yes  Bill's Test #1: Collateral flow confirmed  Site #1: Arterial line  Site Prepped #1: Yes  Number of Attempts #1: 1  Pressure Held #1: Yes  Complications #1: None  Post-procedure #1: Standard  Specimen Status #1: Point of care  How Tolerated?: Tolerated well    Estimated body mass index is 31.27 kg/m² as calculated from the following:    Height as of this encounter: 6' 1\" (1.854 m). Weight as of this encounter: 236 lb 15.9 oz (107.5 kg). No intake or output data in the 24 hours ending 02/07/23 0737      Physical Exam:     Blood pressure 100/82, pulse 73, temperature 97.9 °F (36.6 °C), temperature source Oral, resp. rate 19, height 6' 1\" (1.854 m), weight 236 lb 15.9 oz (107.5 kg), SpO2 93 %. General:    Alert, awake, NAD, on 5 L nasal cannula  Head:  Normocephalic, atraumatic  Eyes:  Sclerae appear normal.  Pupils equally round. ENT:  Nares appear normal.  Moist oral mucosa  Neck:  No restricted ROM. Trachea midline   CV:   RRR. No m/r/g. No jugular venous distension. Lungs:   Coarse breath sounds in right lung base, no wheezing or rhonchi. Abdomen:   Soft, nontender, nondistended. Extremities: No cyanosis or clubbing. No edema, left lower extremity with dressing in foot  Skin:     No rashes and normal coloration. Warm and dry. Neuro:  CN II-XII grossly intact. Psych:  Normal mood and affect.       I have personally reviewed labs and tests:  Recent Labs:  Recent Results (from the past 48 hour(s))   EKG 12 Lead    Collection Time: 02/06/23 10:25 PM   Result Value Ref Range    Ventricular Rate 83 BPM    Atrial Rate 82 BPM    P-R Interval 222 ms    QRS Duration 108 ms    Q-T Interval 372 ms    QTc Calculation (Bazett) 438 ms    P Axis 102 degrees    R Axis 75 degrees    T Axis 79 degrees    Diagnosis Sinus rhythm    Culture, Blood 1    Collection Time: 02/06/23 10:30 PM    Specimen: Blood   Result Value Ref Range    Special Requests LEFT  FOREARM        Culture NO GROWTH AFTER 8 HOURS     Lactate, Sepsis    Collection Time: 02/06/23 10:30 PM   Result Value Ref Range    Lactic Acid, Sepsis 1.2 0.4 - 2.0 MMOL/L   CBC with Auto Differential    Collection Time: 02/06/23 10:30 PM   Result Value Ref Range    WBC 9.7 4.3 - 11.1 K/uL    RBC 3.23 (L) 4.23 - 5.6 M/uL    Hemoglobin 10.3 (L) 13.6 - 17.2 g/dL    Hematocrit 30.4 (L) 41.1 - 50.3 %    MCV 94.1 82 - 102 FL    MCH 31.9 26.1 - 32.9 PG    MCHC 33.9 31.4 - 35.0 g/dL    RDW 17.2 (H) 11.9 - 14.6 %    Platelets 292 994 - 385 K/uL    MPV 10.9 9.4 - 12.3 FL    nRBC 0.00 0.0 - 0.2 K/uL    Differential Type AUTOMATED      Seg Neutrophils 87 (H) 43 - 78 %    Lymphocytes 5 (L) 13 - 44 %    Monocytes 7 4.0 - 12.0 %    Eosinophils % 0 (L) 0.5 - 7.8 %    Basophils 0 0.0 - 2.0 %    Immature Granulocytes 1 0.0 - 5.0 %    Segs Absolute 8.4 (H) 1.7 - 8.2 K/UL    Absolute Lymph # 0.5 0.5 - 4.6 K/UL    Absolute Mono # 0.6 0.1 - 1.3 K/UL    Absolute Eos # 0.0 0.0 - 0.8 K/UL    Basophils Absolute 0.0 0.0 - 0.2 K/UL    Absolute Immature Granulocyte 0.1 0.0 - 0.5 K/UL   CMP    Collection Time: 02/06/23 10:30 PM   Result Value Ref Range    Sodium 134 133 - 143 mmol/L    Potassium 4.3 3.5 - 5.1 mmol/L    Chloride 104 101 - 110 mmol/L    CO2 24 21 - 32 mmol/L    Anion Gap 6 2 - 11 mmol/L    Glucose 193 (H) 65 - 100 mg/dL    BUN 24 (H) 8 - 23 MG/DL    Creatinine 1.20 0.8 - 1.5 MG/DL    Est, [place] Filt Rate >60 >60 ml/min/1.73m2    Calcium 8.5 8.3 - 10.4 MG/DL    Total Bilirubin 1.2 (H) 0.2 - 1.1 MG/DL    ALT 23 12 - 65 U/L    AST 10 (L) 15 - 37 U/L    Alk Phosphatase 78 50 - 136 U/L    Total Protein 6.6 6.3 - 8.2 g/dL    Albumin 3.1 (L) 3.2 - 4.6 g/dL    Globulin 3.5 2.8 - 4.5 g/dL    Albumin/Globulin Ratio 0.9 0.4 - 1.6     Procalcitonin    Collection Time: 02/06/23 10:30 PM   Result Value Ref Range    Procalcitonin 0.22 0.00 - 0.49 ng/mL   Troponin    Collection Time: 02/06/23 10:30 PM   Result Value Ref Range    Troponin, High Sensitivity 20.7 (H) 0 - 14 pg/mL   Brain Natriuretic Peptide    Collection Time: 02/06/23 10:30 PM   Result Value Ref Range    NT Pro- (H) <450 PG/ML   Arterial Blood Gas, POC    Collection Time: 02/06/23 10:53 PM   Result Value Ref Range    DEVICE NASAL CANNULA      FIO2 60 %    pH, Arterial, POC 7.42 7.35 - 7.45      pCO2, Arterial, POC 34.0 (L) 35 - 45 MMHG    pO2, Arterial, POC 80 75 - 100 MMHG    HCO3, Mixed 21.9 (L) 22 - 26 MMOL/L    SO2c, Arterial, POC 96.0 95 - 98 %    BASE DEFICIT (POC) 2.2 mmol/L    POC Bill's Test Positive      Site RIGHT RADIAL      Specimen type: ARTERIAL      Performed by: Keara     Critical Value Read Back AZRARN    Culture, Blood 2    Collection Time: 02/07/23 12:44 AM    Specimen: Blood   Result Value Ref Range    Special Requests LEFT  ARM        Culture PENDING    COVID-19, Rapid    Collection Time: 02/07/23  1:38 AM    Specimen: Nasopharyngeal   Result Value Ref Range    Source NASAL      SARS-CoV-2, Rapid Not detected NOTD     Influenza A/B, Molecular    Collection Time: 02/07/23  1:38 AM    Specimen: Not Specified   Result Value Ref Range    Influenza A, TULIO Not detected NOTD      Influenza B, TULIO Not detected NOTD     Lactate, Sepsis    Collection Time: 02/07/23  4:19 AM   Result Value Ref Range    Lactic Acid, Sepsis 1.2 0.4 - 2.0 MMOL/L       I have personally reviewed imaging studies:  Other Studies:  XR CHEST PORTABLE   Final Result      1. Right lower pneumonia. Recommend chest radiographic follow-up in eight weeks    for resolution.                 Nash Diana M.D.    2/6/2023 11:37:00 PM          Current Meds:  Current Facility-Administered Medications   Medication Dose Route Frequency    [Held by provider] valsartan (DIOVAN) tablet 80 mg  80 mg Oral Daily    rosuvastatin (CRESTOR) tablet 10 mg  10 mg Oral Nightly    insulin lispro (HUMALOG) injection vial 0-8 Units  0-8 Units SubCUTAneous TID WC    insulin lispro (HUMALOG) injection vial 0-4 Units  0-4 Units SubCUTAneous Nightly    latanoprost (XALATAN) 0.005 % ophthalmic solution 1 drop  1 drop Both Eyes Nightly    furosemide (LASIX) tablet 20 mg  20 mg Oral Daily    mometasone-formoterol (DULERA) 100-5 MCG/ACT inhaler 2 puff  2 puff Inhalation BID    clopidogrel (PLAVIX) tablet 75 mg  75 mg Oral Daily    carvedilol (COREG) tablet 25 mg  25 mg Oral BID WC    apixaban (ELIQUIS) tablet 5 mg  5 mg Oral Q12H    amiodarone (PACERONE) tablet 200 mg  200 mg Oral Daily    albuterol (PROVENTIL) nebulizer solution 2.5 mg  2.5 mg Nebulization Q6H PRN    sodium chloride flush 0.9 % injection 5-40 mL  5-40 mL IntraVENous 2 times per day    sodium chloride flush 0.9 % injection 5-40 mL  5-40 mL IntraVENous PRN    0.9 % sodium chloride infusion   IntraVENous PRN    potassium chloride (KLOR-CON M) extended release tablet 40 mEq  40 mEq Oral PRN    Or    potassium bicarb-citric acid (EFFER-K) effervescent tablet 40 mEq  40 mEq Oral PRN    Or    potassium chloride 10 mEq/100 mL IVPB (Peripheral Line)  10 mEq IntraVENous PRN    potassium chloride 10 mEq/100 mL IVPB (Peripheral Line)  10 mEq IntraVENous PRN    magnesium sulfate 2000 mg in 50 mL IVPB premix  2,000 mg IntraVENous PRN    ondansetron (ZOFRAN-ODT) disintegrating tablet 4 mg  4 mg Oral Q8H PRN    Or    ondansetron (ZOFRAN) injection 4 mg  4 mg IntraVENous Q6H PRN    polyethylene glycol (GLYCOLAX) packet 17 g  17 g Oral Daily PRN    bisacodyl (DULCOLAX) suppository 10 mg  10 mg Rectal Daily PRN    famotidine (PEPCID) tablet 10 mg  10 mg Oral Daily PRN    aluminum & magnesium hydroxide-simethicone (MAALOX) 200-200-20 MG/5ML suspension 30 mL  30 mL Oral Q6H PRN    acetaminophen (TYLENOL) tablet 650 mg  650 mg Oral Q6H PRN    Or    acetaminophen (TYLENOL) suppository 650 mg  650 mg Rectal Q6H PRN    [START ON 2/8/2023] cefTRIAXone (ROCEPHIN) 1,000 mg in sodium chloride 0.9 % 50 mL IVPB (mini-bag)  1,000 mg IntraVENous Q24H    doxycycline (VIBRAMYCIN) 100 mg in sodium chloride 0.9 % 100 mL IVPB (mini-bag)  100 mg IntraVENous Q12H       Signed:  Neil Mistry MD    Part of this note may have been written by using a voice dictation software. The note has been proof read but may still contain some grammatical/other typographical errors.

## 2023-02-07 NOTE — ED PROVIDER NOTES
Emergency Department Provider Note                   PCP:                Mau Guerrero MD               Age: 65 y.o. Sex: male       ICD-10-CM    1. Acute respiratory failure with hypoxia (HCC)  J96.01       2. Pneumonia of right lower lobe due to infectious organism  J18.9       3. Altered mental status, unspecified altered mental status type  R41.82           DISPOSITION Decision To Admit 02/07/2023 12:45:57 AM       Medical Decision Making  Acute exacerbation asthma, COPD, CHF, COVID-19, influenza    Bronchitis, aspiration pneumonia, pneumonia,    PE, rib fracture, rib dysfunction, pleurisy, pneumothorax, aspiration of foreign body      Amount and/or Complexity of Data Reviewed  Labs: ordered. Decision-making details documented in ED Course. Radiology: ordered and independent interpretation performed. Decision-making details documented in ED Course. ECG/medicine tests: ordered and independent interpretation performed. Decision-making details documented in ED Course. Risk  Prescription drug management. Complexity of Problem: 2 or more stable chronic illnesses. (4)  Complexity of Problem:1 acute or chronic illness that poses a threat to life or bodily function. (5)  The patients assessment required an independent historian: I spoke with a family member. The patients assessment required an independent historian: I spoke with the paramedic. I have conducted an independent ordering and review of Labs. I have conducted an independent ordering and review of EKG. I have conducted an independent ordering and review of X-rays. I have reviewed records from an external source: provider visit notes from PCP. I have reviewed records from an external source: provider visit notes from outside specialist.  I have reviewed records from an external source: previous old EKG's reviewed. I have reviewed records from an external source: previous lab results from outside ED.   I have reviewed records from an external source: previous imaging studies from outside ED. Considerations: Shared decision making was utilized in the care of this patient. Patient was admitted I have communication with admitting physician. ED Course as of 02/07/23 0047   Mon Feb 06, 2023   2347 XR CHEST PORTABLE  IMPRESSION:     1. Right lower pneumonia. Recommend chest radiographic follow-up in eight weeks   for resolution. [KH]   Tue Feb 07, 2023   9596 I talked to the patient and his wife about the findings in the emergency department and need for admission to the hospital.  They are agreeable with this plan. The patient case was discussed with Dr. Charanjit Salazar through [address] [place] [address] and he will come and see the patient.  [KH]      ED Course User Index  [KH] Roxanne Armendariz,         Orders Placed This Encounter   Procedures    Critical Care    Culture, Blood 1    Culture, Blood 2    XR CHEST PORTABLE    Lactate, Sepsis    CBC with Auto Differential    CMP    Procalcitonin    Blood Gas, Arterial    Troponin    Brain Natriuretic Peptide    Cardiac Monitor - ED Only    Straight Cath (Select if patient is unable to provide a sample)    POCT Urine Dipstick    Arterial Blood Gas, POC    EKG 12 Lead    Saline lock IV        Medications   vancomycin (VANCOCIN) 2000 mg in 0.9% sodium chloride 500 mL IVPB (2,000 mg IntraVENous New Bag 2/6/23 2340)   ibuprofen (ADVIL;MOTRIN) tablet 400 mg (400 mg Oral Given 2/6/23 2245)   cefTRIAXone (ROCEPHIN) 1,000 mg in sodium chloride 0.9 % 50 mL IVPB (mini-bag) (0 mg IntraVENous Stopped 2/7/23 0003)   0.9 % sodium chloride bolus (0 mLs IntraVENous Stopped 2/7/23 0003)       New Prescriptions    No medications on file        Gretchen Ludwig is a 65 y.o. male who presents to the Emergency Department with chief complaint of    Chief Complaint   Patient presents with    Fever      Patient is a 67-year-old male presenting to the emergency department by EMS secondary to altered mental status which started yesterday and progressively worsened throughout the day. Wife said he has been lethargic throughout the day. He wears a CPAP machine at nighttime but does not wear oxygen otherwise. When EMS arrived they found his oxygen saturation at 81%. There patient was placed on 6 L and oxygen came up to 95%. Patient denies any chest pain. He has no acute complaints but his answers to questions were very brief as well.          Review of Systems   Unable to perform ROS: Acuity of condition     Past Medical History:   Diagnosis Date    Acute respiratory failure with hypoxia ([address] 10/23/2014    MINI (acute kidney injury) ([address] 09/15/2014    MINI (acute kidney injury) ([address]     MINI (acute kidney injury) ([address]     Allergic rhinitis 11/10/2015    Asthma 11/10/2015    Benign essential hypertension 11/10/2015    Benign neoplasm of colon 11/10/2015    CAD (coronary artery disease) 11/10/2015    CAD (coronary artery disease) 1998, 1999    mix2, 3 stents [id number]    CAP (community acquired pneumonia) 12/31/2020    Cardiomyopathy ([address] 18/11/3278    Complicated wound infection 11/10/2015    COPD (chronic obstructive pulmonary disease) with emphysema ([address] 11/10/2015    COPD exacerbation ([address] 10/12/2011    COVID-19 12/31/2020    Diabetes mellitus type 2, controlled ([address] 11/10/2015    doesn/t check daily oral meds, A1c 6.0 (8/10/22)    Diabetic neuropathy ([address] 11/10/2015    Disruption of wound, unspecified 10/09/2014    Encounter for long-term (current) use of other high-risk medications 11/10/2015    Extremity atherosclerosis with intermittent claudication ([address] 11/10/2015    H/O endarterectomy 11/10/2015    Hiatal hernia 11/10/2015    History of 2019 novel coronavirus disease (COVID-19)     12/31/2020- hospitalized    Hyperglycemia due to type 1 diabetes mellitus ([address] 11/10/2015    Hyperlipidemia 11/10/2015    ICD (implantable cardioverter-defibrillator) in place 06/16/2022    Monomorphic ventricular tachycardia 12/31/2020    Myocardial infarction ([facility] 1999    Myocardial infarction ([facility] 11/10/2015    Obesity 11/10/2015    Orthostatic hypotension 11/10/2015    Osteoarthritis 11/10/2015    Peripheral vascular disease ([facility] 11/10/2015    PNA (pneumonia) 02/08/2019    PVC (premature ventricular contraction)     Rash 09/71/4099    Seroma complicating a procedure 10/02/2014    Sleep apnea     cpap    Toe laceration     Type 2 diabetes with nephropathy ([facility]     Uncontrolled type 2 diabetes mellitus 11/10/2015    Vertiginous syndromes and other disorders of vestibular system 11/10/2015        Past Surgical History:   Procedure Laterality Date    CARDIAC CATHETERIZATION  12/05/2018    LV EF=40%. LM:nl. LAD:30-40% mid stenosis. LCX OM1:95% stenosis. RCA:100% occlusion at RV branch.     CATARACT REMOVAL Right 07/20/2022    CORONARY ANGIOPLASTY WITH STENT PLACEMENT  12/05/2018    LCX OM1:2.5 x 12 [place] RAKESH    CORONARY ANGIOPLASTY WITH STENT PLACEMENT  1999    CA UNLISTED PROCEDURE ABDOMEN PERITONEUM & OMENTUM  1960    abdominal cyst removed     [address]    stents x3    SPINE SURGERY N/A 07/19/2022    LUMBAR 2, LUMBAR 4 KYPHOPLASTY AND ANY OTHER INDICATED LEVELS performed by Carmenza Sow MD at [facility] MAIN OR    VASCULAR SURGERY  11/5/14    Repair of right common femoral artery     VASCULAR SURGERY  9/11/14    tiffanie femoral endarterectomy    VASCULAR SURGERY Left 10/28/2022    LEFT LOWER EXTREMITY ARTERIOGRAM / ULTRASOUND GUIDED ACCESS   performed by Rebeca Rizo MD at [facility] MAIN OR        Family History   Problem Relation Age of Onset    Breast Cancer Mother     Hypertension Mother     Other Father         DIVERTICULITIS    Crohn's Disease Sister     Lung Disease Brother         mesothioloma    Diabetes Sister     Heart Attack Father     Heart Disease Father     Stroke Mother         Social History     Socioeconomic History    Marital status:    Tobacco Use    Smoking status: Former     Packs/day: 1.00     Years: 50.00 Pack years: 50.00     Types: Cigarettes     Quit date: 10/2/2011     Years since quittin.3    Smokeless tobacco: Current     Types: Chew    Tobacco comments:     Chews tobacco daily   Vaping Use    Vaping Use: Never used   Substance and Sexual Activity    Alcohol use: Yes     Alcohol/week: 0.0 standard drinks    Drug use: No   Social History Narrative    Lives with wife     Social Determinants of Health     Financial Resource Strain: Unknown    Difficulty of Paying Living Expenses: Patient refused   Food Insecurity: Unknown    Worried About Running Out of Food in the Last Year: Patient refused    Ran Out of Food in the Last Year: Patient refused        Allergies: Acetaminophen, Azithromycin, Morphine, Oxaprozin, and Oxycodone    Previous Medications    ACETAMINOPHEN (TYLENOL) 500 MG TABLET    Take 500 mg by mouth every 6 hours as needed for Pain Taking 1/2 two times daily    ALBUTEROL (PROVENTIL) (2.5 MG/3ML) 0.083% NEBULIZER SOLUTION    1 vial via nebulizer 4 times daily if needed for shortness of breath or wheezing. Diagnosis-COPD, J44.9. Bill to Medicare part B    ALBUTEROL SULFATE HFA (PROVENTIL;VENTOLIN;PROAIR) 108 (90 BASE) MCG/ACT INHALER    USE 2 PUFFS BY INHALATION 4 TIMES DAILY AS NEEDED FOR WHEEZING OR SHORTNESS OF BREATH. Patient prefers ventolin. AMIODARONE (CORDARONE) 200 MG TABLET    Take 200 mg by mouth daily    APIXABAN (ELIQUIS) 5 MG TABS TABLET    Take 5 mg by mouth every 12 hours    ASCORBIC ACID (VITAMIN C) 500 MG TABLET    Take 500 mg by mouth    CARVEDILOL (COREG) 25 MG TABLET    Take 1 tablet by mouth 2 times daily (with meals)    CHOLECALCIFEROL 50 MCG (2000 UT) TABS    Take 2,000 Units by mouth    CLOPIDOGREL (PLAVIX) 75 MG TABLET    Take 75 mg by mouth daily    CPAP MACHINE MISC    by Does not apply route    DORZOLAMIDE-TIMOLOL (COSOPT) 22.3-6.8 MG/ML OPHTHALMIC SOLUTION        FLUTICASONE (FLONASE) 50 MCG/ACT NASAL SPRAY    USE 1 OR 2 SPRAYS IN EACH NOSTRIL EVERY DAY. FLUTICASONE-SALMETEROL (ADVAIR) 250-50 MCG/ACT AEPB DISKUS INHALER    Inhale 1 puff into the lungs in the morning and at bedtime    FUROSEMIDE (LASIX) 20 MG TABLET    Take 1 tablet by mouth in the morning. GLIPIZIDE (GLUCOTROL XL) 10 MG EXTENDED RELEASE TABLET    Take 1 tablet by mouth daily    GUAIFENESIN 1200 PO    Take 1 tablet by mouth every 12 hours as needed    LATANOPROST (XALATAN) 0.005 % OPHTHALMIC SOLUTION    Apply 1 drop to eye nightly    MAGNESIUM OXIDE (MAG-OX) 400 (240 MG) MG TABLET    TAKE 1 TABLET BY MOUTH EVERY DAY    MONTELUKAST (SINGULAIR) 10 MG TABLET    TAKE 1 TABLET BY MOUTH EVERY DAY AT BEDTIME    NITROGLYCERIN (NITROSTAT) 0.4 MG SL TABLET    Place 0.4 mg under the tongue    ROSUVASTATIN (CRESTOR) 10 MG TABLET    TAKE 1 TABLET BY MOUTH EVERY DAY    SENNA-DOCUSATE (PERICOLACE) 8.6-50 MG PER TABLET    Take 1 tablet by mouth nightly    TAMSULOSIN (FLOMAX) 0.4 MG CAPSULE    Take 0.4 mg by mouth daily    TRAMADOL (ULTRAM) 50 MG TABLET    Take 50 mg by mouth every 6 hours as needed for Pain. Taking 1/2 every morning    VALSARTAN (DIOVAN) 80 MG TABLET    Take 1 tablet by mouth daily        Vitals signs and nursing note reviewed. Patient Vitals for the past 4 hrs:   Temp Pulse Resp BP SpO2   02/06/23 2214 (!) 102.2 °F (39 °C) 80 18 126/64 (!) 81 %          Physical Exam     GENERAL:The patient has Body mass index is 34.36 kg/m². Well-hydrated. VITAL SIGNS: Heart rate, blood pressure, respiratory rate reviewed as recorded in  nurse's notes  EYES: Pupils reactive. Extraocular motion intact. No conjunctival redness or drainage. EARS: No external masses or lesions. NOSE: No nasal drainage or epistaxis. MOUTH/THROAT: Pharynx clear; airway patent. NECK: Supple, no meningeal signs. Trachea midline. No masses or thyromegaly. LUNGS: no accessory muscle use. Rhonchi in the right lower lung field noted.   CHEST: No deformity  CARDIOVASCULAR: Regular rate and rhythm  EXTREMITIES: No clubbing or cyanosis. No joint swelling. Normal muscle tone. No  restricted range of motion appreciated. NEUROLOGIC: Sensation is grossly intact. Cranial nerve exam reveals face is  symmetrical, tongue is midline speech is clear. SKIN: No rash or petechiae. Good skin turgor palpated. PSYCHIATRIC: Alert and oriented. Appropriate behavior and judgment. Critical Care  Performed by: Grabiel Nagel DO  Authorized by: Grabiel Nagel DO     Comments:      Critical care time: 77 minutes of critical care time was performed in the emergency department. This was separate from any other procedures listed during the patients emergency department course. The failure to initiate these interventions on an urgent basis would likely have resulted in sudden, clinically significant or life-threatening deterioration in the patients condition. ED EKG Interpretation  EKG was interpreted in the absence of a cardiologist.    Rate: 83  EKG Interpretation: EKG Interpretation: sinus rhythm  ST Segments: Normal ST segments - NO STEMI      Results for orders placed or performed during the hospital encounter of 02/06/23   Culture, Blood 1    Specimen: Blood   Result Value Ref Range    Special Requests LEFT  FOREARM        Culture PENDING    XR CHEST PORTABLE    Narrative    EXAMINATION: XR CHEST PORTABLE    DATE: 2/6/2023 10:30 PM    INDICATION: ; Sepsis    COMPARISON:  December 20, 2022    FINDINGS:      Moderate right lower lung consolidative opacity is new. Minor background   interstitial opacities are redemonstrated. No visible pleural effusion or pneumothorax. Cardiomediastinal silhouette  unchanged. Cardiac device with leads in the right   atrium and partly imaged in the right ventricle. Impression    1. Right lower pneumonia. Recommend chest radiographic follow-up in eight weeks   for resolution.            Luis Antonio Ji M.D.   2/6/2023 11:37:00 PM   Lactate, Sepsis   Result Value Ref Range    Lactic Acid, Sepsis 1.2 0.4 - 2.0 MMOL/L   CBC with Auto Differential   Result Value Ref Range    WBC 9.7 4.3 - 11.1 K/uL    RBC 3.23 (L) 4.23 - 5.6 M/uL    Hemoglobin 10.3 (L) 13.6 - 17.2 g/dL    Hematocrit 30.4 (L) 41.1 - 50.3 %    MCV 94.1 82 - 102 FL    MCH 31.9 26.1 - 32.9 PG    MCHC 33.9 31.4 - 35.0 g/dL    RDW 17.2 (H) 11.9 - 14.6 %    Platelets 906 761 - 839 K/uL    MPV 10.9 9.4 - 12.3 FL    nRBC 0.00 0.0 - 0.2 K/uL    Differential Type AUTOMATED      Seg Neutrophils 87 (H) 43 - 78 %    Lymphocytes 5 (L) 13 - 44 %    Monocytes 7 4.0 - 12.0 %    Eosinophils % 0 (L) 0.5 - 7.8 %    Basophils 0 0.0 - 2.0 %    Immature Granulocytes 1 0.0 - 5.0 %    Segs Absolute 8.4 (H) 1.7 - 8.2 K/UL    Absolute Lymph # 0.5 0.5 - 4.6 K/UL    Absolute Mono # 0.6 0.1 - 1.3 K/UL    Absolute Eos # 0.0 0.0 - 0.8 K/UL    Basophils Absolute 0.0 0.0 - 0.2 K/UL    Absolute Immature Granulocyte 0.1 0.0 - 0.5 K/UL   CMP   Result Value Ref Range    Sodium 134 133 - 143 mmol/L    Potassium 4.3 3.5 - 5.1 mmol/L    Chloride 104 101 - 110 mmol/L    CO2 24 21 - 32 mmol/L    Anion Gap 6 2 - 11 mmol/L    Glucose 193 (H) 65 - 100 mg/dL    BUN 24 (H) 8 - 23 MG/DL    Creatinine 1.20 0.8 - 1.5 MG/DL    Est, Glom Filt Rate >60 >60 ml/min/1.73m2    Calcium 8.5 8.3 - 10.4 MG/DL    Total Bilirubin 1.2 (H) 0.2 - 1.1 MG/DL    ALT 23 12 - 65 U/L    AST 10 (L) 15 - 37 U/L    Alk Phosphatase 78 50 - 136 U/L    Total Protein 6.6 6.3 - 8.2 g/dL    Albumin 3.1 (L) 3.2 - 4.6 g/dL    Globulin 3.5 2.8 - 4.5 g/dL    Albumin/Globulin Ratio 0.9 0.4 - 1.6     Procalcitonin   Result Value Ref Range    Procalcitonin 0.22 0.00 - 0.49 ng/mL   Troponin   Result Value Ref Range    Troponin, High Sensitivity 20.7 (H) 0 - 14 pg/mL   Brain Natriuretic Peptide   Result Value Ref Range    NT Pro- (H) <450 PG/ML   Arterial Blood Gas, POC   Result Value Ref Range    DEVICE NASAL CANNULA      FIO2 60 %    pH, Arterial, POC 7.42 7.35 - 7.45      pCO2, Arterial, POC 34.0 (L) 35 - 45 MMHG    pO2, Arterial, POC 80 75 - 100 MMHG    HCO3, Mixed 21.9 (L) 22 - 26 MMOL/L    SO2c, Arterial, POC 96.0 95 - 98 %    BASE DEFICIT (POC) 2.2 mmol/L    POC Bill's Test Positive      Site RIGHT RADIAL      Specimen type: ARTERIAL      Performed by: Keara     Critical Value Read Back [email or web address]    EKG 12 Lead   Result Value Ref Range    Ventricular Rate 83 BPM    Atrial Rate 82 BPM    P-R Interval 222 ms    QRS Duration 108 ms    Q-T Interval 372 ms    QTc Calculation (Bazett) 438 ms    P Axis 102 degrees    R Axis 75 degrees    T Axis 79 degrees    Diagnosis Sinus rhythm         XR CHEST PORTABLE   Final Result      1. Right lower pneumonia. Recommend chest radiographic follow-up in eight weeks    for resolution. Yenni Guzman M.D.    2/6/2023 11:37:00 PM                            Voice dictation software was used during the making of this note. This software is not perfect and grammatical and other typographical errors may be present. This note has not been completely proofread for errors.        Jason Duval DO  02/07/23 3904

## 2023-02-07 NOTE — ED TRIAGE NOTES
From home, c/o cough, congestion, altered mental status per family x 2 days. 84 on RA. EMS had patient on NRB. 18 in L AC. EMS states A&O x 1.

## 2023-02-07 NOTE — ED NOTES
TRANSFER - OUT REPORT:    Verbal report given to Gunnar Mena RN on Zaira Rg  being transferred to 0 for routine progression of patient care       Report consisted of patient's Situation, Background, Assessment and   Recommendations(SBAR). Information from the following report(s) Nurse Handoff Report, ED Encounter Summary, ED SBAR, Intake/Output, and MAR was reviewed with the receiving nurse. [place] Assessment: No data recorded  Lines:   Peripheral IV 02/06/23 Left Forearm (Active)        Opportunity for questions and clarification was provided.       Patient transported with:  Clare De Souza RN  02/07/23 0151

## 2023-02-07 NOTE — PROGRESS NOTES
Physician Progress Note      PATIENT:               Abbey Gandhi  [facility] #:                  [id number]  :                       1945  ADMIT DATE:       2023 10:09 PM  [address] [place] DATE:  RESPONDING  PROVIDER #:        Osiris Honeycutt MD        QUERY TEXT:    Type of Anemia: Please provide further specificity, if known. Clinical indicators include:  Options provided:  -- Anemia due to acute blood loss  -- Anemia due to chronic blood loss  -- Anemia due to iron deficiency  -- Anemia due to postoperative blood loss  -- Anemia due to chronic disease  -- Other - I will add my own diagnosis  -- Disagree - Not applicable / Not valid  -- Disagree - Clinically Unable to determine / Unknown        PROVIDER RESPONSE TEXT:    The patient has anemia due to chronic disease. QUERY TEXT:    Pt admitted with acute respiratory failure. Pt noted to have AMS, lethargy,   somnolence, metabolic encephalopathy, O2 sat 81% RA If possible, please   document in the progress notes and discharge summary if you are evaluating   and/or treating any of the following:      Note: CAP and HCAP indicate where the pneumonia was acquired, not a specific   type.     The medical record reflects the following:  Risk Factors: 65 YOM, DM1, COPD, former smoker, Sleep apnea CPAP  Clinical Indicators: fever, AMS, lethargy, cough, congestion,  102.2 80 18   126/64 81% BMI 31.27, somnolence noted in HP,   CXR: RLL PNA  Treatment: Monitoring, Maxipime 2gm IV, Vancomycin IV, Rocephin IV,   Doxycycline IV, Nebs, O2 6L,    Thank you,  Astrid Lynn RN,C BSN  Clinical   [email or web address]  Options provided:  -- Gram negative pneumonia  -- Gram positive pneumonia  -- MRSA pneumonia  -- MSSA pneumonia  -- Viral pneumonia  -- Aspiration pneumonia  -- Other - I will add my own diagnosis  -- Disagree - Not applicable / Not valid  -- Disagree - Clinically unable to determine / Unknown  -- Refer to Clinical Documentation Reviewer    PROVIDER RESPONSE TEXT:    This patient has gram negative pneumonia. Query created by: Radha Willett on 2/7/2023 12:09 PM      QUERY TEXT:    Patient admitted with acute respiratory failure and PNA, noted to have atrial   fibrillation. If possible, please document in progress notes and discharge   summary further specificity regarding the type of atrial fibrillation: The medical record reflects the following:  Risk Factors: 65 YOM, HTN, CAD, DM1, cardiomyopathy,  Clinical Indicators: Hx AF  Treatment: Monitoring, Eliquis, Amiodarone,    Chronic: nonspecific term that could be referring to paroxysmal, persistent,   or permanent  Longstanding persistent: persistent and continuous, lasting > 1 year. Paroxysmal - self-terminating or intermittent; resolves with or without   intervention within 7 days of onset; may recur with various frequency. Persistent - Fails to terminate within 7 days; Often requires meds or   cardioversion to restore to NSR. Permanent - longstanding & persistent; Medication has been ineffective in   restoring NSR &/or cardioversion is contraindicated    Definitions per [employer] Training Guide and Quick Reference Guide, [address] 5   Diseases and Disorders of the Circulatory System; 2019; [employer] Software content   from the [employer]    Thank you,  Lynett Lombard RN,C BSN  Clinical   [email or web address]  Options provided:  -- Paroxysmal Atrial Fibrillation  -- Longstanding Persistent Atrial Fibrillation  -- Permanent Atrial Fibrillation  -- Persistent Atrial Fibrillation  -- Chronic Atrial Fibrillation, unspecified  -- Other - I will add my own diagnosis  -- Disagree - Not applicable / Not valid  -- Disagree - Clinically unable to determine / Unknown  -- Refer to Clinical Documentation Reviewer    PROVIDER RESPONSE TEXT:    This patient has paroxysmal atrial fibrillation. Query created by:  Radha Willett on 2/7/2023 12:13 PM      Electronically signed by:  Monika Page MD 2/7/2023 2:13 PM

## 2023-02-07 NOTE — PROGRESS NOTES
ACUTE PHYSICAL THERAPY GOALS:   (Developed with and agreed upon by patient and/or caregiver. )  LTG:  (1.)Mr. Griselda Amend will move from supine to sit and sit to supine , scoot up and down, and roll side to side in bed with MODIFIED INDEPENDENCE within 7 treatment day(s). (2.)Mr. Griselda Amend will transfer from bed to chair and chair to bed with MODIFIED INDEPENDENCE using the least restrictive device within 7 treatment day(s). (3.)Mr. Griselda Amend will ambulate with MODIFIED INDEPENDENCE for 250 feet with the least restrictive device within 7 treatment day(s). (4.)Mr. Griselda Amend will perform exercises per HEP independently to improve strength and mobility within 7 treatment day(s). ________________________________________________________________________________________________      PHYSICAL THERAPY Initial Assessment and PM  (Link to Caseload Tracking: PT Visit Days : 1  Acknowledge Orders  Time In/Out  PT Charge Capture  Rehab Caseload Tracker    Kamron Bolanos is a 65 y.o. male   PRIMARY DIAGNOSIS: Acute respiratory failure with hypoxia (HCC)  Acute respiratory failure with hypoxia (HCC) [J96.01]  Altered mental status, unspecified altered mental status type [R41.82]  Pneumonia of right lower lobe due to infectious organism [J18.9]       Reason for Referral: Generalized Muscle Weakness (M62.81)  Difficulty in walking, Not elsewhere classified (R26.2)  Other abnormalities of gait and mobility (R26.89)  Inpatient: Payor: MEDICARE / Plan: MEDICARE PART A AND B / Product Type: *No Product type* /     ASSESSMENT:     REHAB RECOMMENDATIONS:   Recommendation to date pending progress:  Setting:  Home Health Therapy    Equipment:    To Be Determined     ASSESSMENT:  Mr. Griselda Amend is admitted from home with respiratory failure. He lives with spouse and is modified independent with household ambulation using rollator. Current with HH PT 1 day/wk. Denies home O2 use. Presents without specific complaints, on 4L O2 via NC.  Agreeable to therapy assessment and mobility. Transfers to sitting with additional time, verbal cues, and CGA using bed rails. Worked on seated mobility and balance, sit-stand transfers x several trials to RW, standing pre-gait activities and marching. Gait training in room 30 ft then 60 ft with seated rest break between. Pt demonstrates forward flexed posture and needs cueing for upright position. SpO2 94% after activity on 4L. Assisted to recliner following session, positioned comfortably with legs elevated, needs in reach, and RN notified. Mr. Leidy Cross is functioning slightly below baseline with mobility, balance, and activity tolerance. Will continue to follow for therapy during hospital stay and recommend increasing current home health PT to 2-3x/wk.       [address] [facility] [address] [address] AM-PAC 6 Clicks Basic Mobility Inpatient Short Form  AM-PAC Basic Mobility - Inpatient   How much help is needed turning from your back to your side while in a flat bed without using bedrails?: None  How much help is needed moving from lying on your back to sitting on the side of a flat bed without using bedrails?: None  How much help is needed moving to and from a bed to a chair?: A Little  How much help is needed standing up from a chair using your arms?: A Little  How much help is needed walking in hospital room?: A Little  How much help is needed climbing 3-5 steps with a railing?: A Little  [facility] Inpatient Mobility Raw Score : 20  AM-PAC Inpatient T-Scale Score : 47.67  Mobility Inpatient CMS 0-100% Score: 35.83  Mobility Inpatient CMS G-Code Modifier : CJ    SUBJECTIVE:   Mr. Leidy Cross states, \"I'm ready\"     Social/Functional Lives With: Spouse  Type of Home: House  Home Layout: Two level  Home Access: Ramped entrance  Bathroom Shower/Tub: Tub/Shower unit  Bathroom Toilet: Standard  Bathroom Equipment: Grab bars in shower, Toilet raiser  Bathroom Accessibility: Accessible  Home Equipment: [address] Help From: Family  ADL Assistance: Independent  Homemaking Assistance: Independent  Homemaking Responsibilities: Yes  Ambulation Assistance: Independent  Transfer Assistance: Independent  Active : No  Patient's  Info:  Wife  Mode of Transportation: Car  Occupation: Retired    OBJECTIVE:     PAIN: VITALS / O2: PRECAUTION / [facility] / Tuan Katos:   Pre Treatment:          Post Treatment: 0/10 Vitals        Oxygen 94% after activity on 4L NC      None    RESTRICTIONS/PRECAUTIONS:                    GROSS EVALUATION: Intact Impaired (Comments):   AROM [x]     PROM []    Strength []  Generally decreased   Balance []  Fair+ dynamic standing with RW   Posture [] Forward Head  Rounded Shoulders  Trunk Flexion   Sensation []     Coordination []      Tone []     Edema []    Activity Tolerance []  decreased    []      COGNITION/  PERCEPTION: Intact Impaired (Comments):   Orientation [x]     Vision [x]     Hearing [x]     Cognition  []       MOBILITY: I Mod I S SBA CGA Min Mod Max Total  NT x2 Comments:   Bed Mobility    Rolling [] [] [] [] [] [] [] [] [] [] []    Supine to Sit [] [] [] [] [x] [] [] [] [] [] []    Scooting [] [] [] [] [x] [] [] [] [] [] []    Sit to Supine [] [] [] [] [] [] [] [] [] [] []    Transfers    Sit to Stand [] [] [] [] [x] [] [] [] [] [] []    Bed to Chair [] [] [] [] [x] [x] [] [] [] [] []    Stand to Sit [] [] [] [] [x] [] [] [] [] [] []     [] [] [] [] [] [] [] [] [] [] []    I=Independent, Mod I=Modified Independent, S=Supervision, SBA=Standby Assistance, CGA=Contact Guard Assistance,   Min=Minimal Assistance, Mod=Moderate Assistance, Max=Maximal Assistance, Total=Total Assistance, NT=Not Tested    GAIT: I Mod I S SBA CGA Min Mod Max Total  NT x2 Comments:   Level of Assistance [] [] [] [] [x] [x] [] [] [] [] []    Distance 30  feet + 60 feet    DME Rolling Walker    Gait Quality Decreased paul , Decreased step length, Trunk sway increased, and Wide base of support    Weightbearing Status      Stairs I=Independent, Mod I=Modified Independent, S=Supervision, SBA=Standby Assistance, CGA=Contact Guard Assistance,   Min=Minimal Assistance, Mod=Moderate Assistance, Max=Maximal Assistance, Total=Total Assistance, NT=Not Tested    PLAN:   [address]: 3 times/week for duration of hospital stay or until stated goals are met, whichever comes first.    THERAPY PROGNOSIS: Good    PROBLEM LIST:   (Skilled intervention is medically necessary to address:)  Decreased Activity Tolerance  Decreased Balance  Decreased Gait Ability  Decreased Safety Awareness  Decreased Strength  Decreased Transfer Abilities INTERVENTIONS PLANNED:   (Benefits and precautions of physical therapy have been discussed with the patient.)  Therapeutic Activity  Therapeutic Exercise/HEP  Neuromuscular Re-education  Gait Training  Education       TREATMENT:   EVALUATION: LOW COMPLEXITY: (Untimed Charge)    TREATMENT:   Therapeutic Activity (8 Minutes): Therapeutic activity included Supine to Sit, Scooting, Transfer Training, Sitting balance , and Standing balance to improve functional Activity tolerance, Balance, Coordination, Mobility, and Strength. Gait Training (15 Minutes): Gait training for 30 + 60 feet utilizing [address] [facility]. Patient required Manual, Tactile, Verbal, and Visual cueing to improve Activity Pacing, Assistive Device Utilization, Dynamic Standing Balance, and Gait Mechanics.      TREATMENT GRID:  N/A    AFTER TREATMENT PRECAUTIONS: Bed/Chair Locked, Call light within reach, Chair, Needs within reach, and RN notified    INTERDISCIPLINARY COLLABORATION:  RN/ PCT and PT/ PTA    EDUCATION: Education Given To: Patient  Education Provided: Role of Therapy;Plan of Care  Education Method: Verbal  Barriers to Learning: None  Education Outcome: Verbalized understanding;Continued education needed    TIME IN/OUT:  Time In: 1420  Time Out: 601 [address]  Minutes: [place] [address] Indra Rizzo PT

## 2023-02-07 NOTE — H&P
Hospitalist History and Physical   Admit Date:  2023 10:09 PM   Name:  Courtney Coates   Age:  65 y.o. Sex:  male  :  1945   MRN:  [id number]   Room:  [facility]/    Presenting Complaint: Fever     Reason(s) for Admission: Acute respiratory failure with hypoxia ([facility] [address] [J96.01]     History of Present Illness:   Courtney Coates is a 65 y.o. male with medical history of ischemic CM, chronic sCHF, SHERI, PAD, COPD, HTN, DM2, HLD and atrial fib presented to the emergency room with cough, congestion and lethargy. Reports lethargy worsened in the afternoon. Patient has been dealing with cough and congestion starting on  and had a slight fever overnight. Fever resolved Monday AM and patient was doing well but then became lethargic and sleeping more. Tanda Bun Upon EMS evaluation, patient was hypoxic to 81% on room air requiring nonrebreather in route to the hospital.  He is somnolent and therefore information was obtained from EMR, ED physician and patient's family. In the ER patient was febrile to 102.2 °F, hypoxic on room air with saturation of  81%. Laboratory work-up notable for proBNP of 616, hemoglobin 10.3. Chest x-ray with right lower lobe pneumonia. Procalcitonin 0.22    Assessment & Plan:     Acute hypoxic respiratory failure due to right lower lobe pneumonia  Saturation 81% on room air in the emergency room with respiratory distress requiring 6 L O2 nasal cannula.     Chest x-ray with right lower lobe pneumonia and procalcitonin 0.2 to    - Rocephin and doxycycline IV   - Tessalon Perles and Mucinex  - F/u BCX  - O2 supplement and wean as tolerated  - check covid and flu    Acute metabolic encephalopathy likely due to hypoxia and underlying infection  -Treat underlying cause  -O2 supplementation  -Consider CT scan if not improve    Anemia  Hemoglobin at 10.3 which appears to be his baseline  - Monitor hemoglobin and transfuse as needed      CAD // chronic sCHF // Afib // HTN  - Con't Eliquis, amio, Coreg, plavix  - hold diovan given normo tension    COPD: continue with nebs, not in exacerbation  SHERI: on cpap at night time  T2DM: hold oral agent and start ISS      Diet: Regular Diabetic diet  VTE ppx: lovenox  Code status: FULL    Additional Medical Decision Making factors:  Complexity: 1 or more acute or chronic illness with exacerbation that poses a threat to life, organ, or function. (High)  Complexity: 2 or more stable chronic illnesses. (Moderate)    The patient assessment required an independent historian: a family member. I have reviewed records from an external source: notes from outside hospital.    I conducted an independent review of: Labs   I conducted an independent review of: Imaging and/or other diagnostic studies     Treatment Risks: Prescription drug management. (Moderate)    Shared decision making was utilized in the care of this patient. I discussed patient's case with an ER provider. Hospital Problems:  Principal Problem:    Acute respiratory failure with hypoxia (HCC)  Active Problems:    ICD (implantable cardioverter-defibrillator) in place    Anemia    Atrial fibrillation (HCC)    HTN (hypertension)    COPD (chronic obstructive pulmonary disease) ([facility])    Diabetic neuropathy ([facility])    Coronary artery disease involving native coronary artery of native heart without angina pectoris    PAD (peripheral artery disease) ([facility])    S/P angioplasty with stent    DM (diabetes mellitus) type II, controlled, with peripheral vascular disorder ([address]  Resolved Problems:    * No resolved hospital problems.  *       Past History:     Past Medical History:   Diagnosis Date    Acute respiratory failure with hypoxia ([address] 10/23/2014    MINI (acute kidney injury) ([address] 09/15/2014    MINI (acute kidney injury) ([address]     MINI (acute kidney injury) ([address]     Allergic rhinitis 11/10/2015    Asthma 11/10/2015    Benign essential hypertension 11/10/2015    Benign neoplasm of colon 11/10/2015    CAD (coronary artery disease) 11/10/2015    CAD (coronary artery disease) 1998, 1999    mix2, 3 stents [id number]    CAP (community acquired pneumonia) 12/31/2020    Cardiomyopathy ([address] 04/35/9025    Complicated wound infection 11/10/2015    COPD (chronic obstructive pulmonary disease) with emphysema ([address] 11/10/2015    COPD exacerbation ([address] 10/12/2011    COVID-19 12/31/2020    Diabetes mellitus type 2, controlled ([address] 11/10/2015    doesn/t check daily oral meds, A1c 6.0 (8/10/22)    Diabetic neuropathy ([address] 11/10/2015    Disruption of wound, unspecified 10/09/2014    Encounter for long-term (current) use of other high-risk medications 11/10/2015    Extremity atherosclerosis with intermittent claudication ([address] 11/10/2015    H/O endarterectomy 11/10/2015    Hiatal hernia 11/10/2015    History of 2019 novel coronavirus disease (COVID-19)     12/31/2020- hospitalized    Hyperglycemia due to type 1 diabetes mellitus ([address] 11/10/2015    Hyperlipidemia 11/10/2015    ICD (implantable cardioverter-defibrillator) in place 06/16/2022    Monomorphic ventricular tachycardia 12/31/2020    Myocardial infarction ([address] 1999    Myocardial infarction ([address] 11/10/2015    Obesity 11/10/2015    Orthostatic hypotension 11/10/2015    Osteoarthritis 11/10/2015    Peripheral vascular disease ([address] 11/10/2015    PNA (pneumonia) 02/08/2019    PVC (premature ventricular contraction)     Rash 81/61/0945    Seroma complicating a procedure 10/02/2014    Sleep apnea     cpap    Toe laceration     Type 2 diabetes with nephropathy ([address]     Uncontrolled type 2 diabetes mellitus 11/10/2015    Vertiginous syndromes and other disorders of vestibular system 11/10/2015       Past Surgical History:   Procedure Laterality Date    CARDIAC CATHETERIZATION  12/05/2018    LV EF=40%. LM:nl. LAD:30-40% mid stenosis. LCX OM1:95% stenosis. RCA:100% occlusion at RV branch.     CATARACT REMOVAL Right 07/20/2022    CORONARY ANGIOPLASTY WITH STENT PLACEMENT  12/05/2018 LCX OM1:2.5 x 12 Ovando RAKESH    CORONARY ANGIOPLASTY WITH STENT PLACEMENT      [place] UNLISTED PROCEDURE ABDOMEN PERITONEUM & OMENTUM  1960    abdominal cyst removed     [address]    stents x3    SPINE SURGERY N/A 2022    LUMBAR 2, LUMBAR 4 KYPHOPLASTY AND ANY OTHER INDICATED LEVELS performed by Gwendolyn Decker III, MD at [address] [place] [address]  14    Repair of right common femoral artery     VASCULAR SURGERY  14    tiffanie femoral endarterectomy    VASCULAR SURGERY Left 10/28/2022    LEFT LOWER EXTREMITY ARTERIOGRAM / ULTRASOUND GUIDED ACCESS   performed by Nell Macdonald MD at [facility] MAIN OR        Social History     Tobacco Use    Smoking status: Former     Packs/day: 1.00     Years: 50.00     Pack years: 50.00     Types: Cigarettes     Quit date: 10/2/2011     Years since quittin.3    Smokeless tobacco: Current     Types: Chew    Tobacco comments:     Chews tobacco daily   Substance Use Topics    Alcohol use:  Yes     Alcohol/week: 0.0 standard drinks      Social History     Substance and Sexual Activity   Drug Use No       Family History   Problem Relation Age of Onset    Breast Cancer Mother     Hypertension Mother     Other Father         DIVERTICULITIS    Crohn's Disease Sister     Lung Disease Brother         mesothioloma    Diabetes Sister     Heart Attack Father     Heart Disease Father     Stroke Mother         Immunization History   Administered Date(s) Administered    COVID-19, MODERNA BLUE border, Primary or Immunocompromised, (age 12y+), IM, 100 mcg/0.5mL 03/15/2021, 04/15/2021    Influenza Trivalent 2014, 10/12/2015    Influenza Virus Vaccine 2010, 10/17/2011, 2013, 2014, 2021    Influenza, FLUAD, (age 72 y+), Adjuvanted, 0.5mL 2021    Influenza, High Dose (Fluzone 65 yrs and older) 2016, 2017, 2018, 10/07/2019, 2020    PPD Test 2021, 2022, 2022, 2022, 01/02/2023    Pneumococcal Conjugate 13-valent ([id number]) 04/10/2015    Pneumococcal Polysaccharide (Pucnbtqct60) 11/27/2007, 10/17/2011    Pneumococcal Vaccine 10/17/2011    Zoster Recombinant (Shingrix) 09/06/2018, 11/27/2018     Allergies   Allergen Reactions    Acetaminophen Hives     Per spouse has small amount of hives, takes 1/2 and tolerates     Azithromycin Hives    Morphine Hallucinations     Muscle twitching. jerking    Oxaprozin Other (See Comments)     ELEVATED BLOOD PRESSURE    Oxycodone Anxiety     Prior to Admit Medications:  Current Outpatient Medications   Medication Instructions    acetaminophen (TYLENOL) 500 mg, Oral, EVERY 6 HOURS PRN, Taking 1/2 two times daily    albuterol (PROVENTIL) (2.5 MG/3ML) 0.083% nebulizer solution 1 vial via nebulizer 4 times daily if needed for shortness of breath or wheezing. Diagnosis-COPD, J44.9. Bill to Medicare part B    albuterol sulfate HFA (PROVENTIL;VENTOLIN;PROAIR) 108 (90 Base) MCG/ACT inhaler USE 2 PUFFS BY INHALATION 4 TIMES DAILY AS NEEDED FOR WHEEZING OR SHORTNESS OF BREATH. Patient prefers ventolin. amiodarone (CORDARONE) 200 mg, Oral, DAILY    apixaban (ELIQUIS) 5 mg, Oral, EVERY 12 HOURS    ascorbic acid (VITAMIN C) 500 mg, Oral    carvedilol (COREG) 25 mg, Oral, 2 TIMES DAILY WITH MEALS    Cholecalciferol 2,000 Units, Oral    clopidogrel (PLAVIX) 75 mg, Oral, DAILY    CPAP Machine MISC Does not apply    dorzolamide-timolol (COSOPT) 22.3-6.8 MG/ML ophthalmic solution No dose, route, or frequency recorded. fluticasone (FLONASE) 50 MCG/ACT nasal spray USE 1 OR 2 SPRAYS IN EACH NOSTRIL EVERY DAY.     fluticasone-salmeterol (ADVAIR) 250-50 MCG/ACT AEPB diskus inhaler 1 puff, Inhalation, 2 times daily    furosemide (LASIX) 20 mg, Oral, DAILY    glipiZIDE (GLUCOTROL XL) 10 mg, Oral, DAILY    GUAIFENESIN 1200 PO 1 tablet, Oral, EVERY 12 HOURS PRN    latanoprost (XALATAN) 0.005 % ophthalmic solution 1 drop, Ophthalmic, NIGHTLY    magnesium oxide (MAG-OX) 400 (240 Mg) MG tablet TAKE 1 TABLET BY MOUTH EVERY DAY    montelukast (SINGULAIR) 10 MG tablet TAKE 1 TABLET BY MOUTH EVERY DAY AT BEDTIME    nitroGLYCERIN (NITROSTAT) 0.4 mg, SubLINGual    rosuvastatin (CRESTOR) 10 MG tablet TAKE 1 TABLET BY MOUTH EVERY DAY    senna-docusate (PERICOLACE) 8.6-50 MG per tablet 1 tablet, Oral, NIGHTLY    valsartan (DIOVAN) 80 mg, Oral, DAILY         Objective:   Patient Vitals for the past 24 hrs:   Temp Pulse Resp BP SpO2   02/06/23 2214 (!) 102.2 °F (39 °C) 80 18 126/64 (!) 81 %       Oxygen Therapy  SpO2: (!) 81 %  O2 Device: Nasal cannula  O2 Flow Rate (L/min): 8 L/min  Blood Gas  Performed?: Yes  Bill's Test #1: Collateral flow confirmed  Site #1: Arterial line  Site Prepped #1: Yes  Number of Attempts #1: 1  Pressure Held #1: Yes  Complications #1: None  Post-procedure #1: Standard  Specimen Status #1: Point of care  How Tolerated?: Tolerated well    Estimated body mass index is 34.36 kg/m² as calculated from the following:    Height as of this encounter: 5' 8\" (1.727 m). Weight as of 1/26/23: 226 lb (102.5 kg). No intake or output data in the 24 hours ending 02/07/23 0122      Physical Exam:    Blood pressure 126/64, pulse 80, temperature (!) 102.2 °F (39 °C), temperature source Oral, resp. rate 18, height 5' 8\" (1.727 m), SpO2 (!) 81 %. General:    Ill appearing and somnolent    Head:  Normocephalic, atraumatic  Eyes:  Sclerae appear normal.  Pupils equally round. ENT:  Nares appear normal.  Moist oral mucosa  Neck:  No restricted ROM. Trachea midline   CV:   RRR. No m/r/g. No jugular venous distension. Lungs: No wheezing. Scant crackles with decreased breath sounds on the right. Symmetric expansion. Abdomen:   Soft, nontender, nondistended. Extremities: No cyanosis or clubbing. No edema  Skin:     No rashes and normal coloration. Warm and dry. Neuro:  CN II-XII grossly intact. Sensation intact. Psych:  Normal mood and affect.       I have personally reviewed labs and tests:  Recent Labs:  Recent Results (from the past 24 hour(s))   EKG 12 Lead    Collection Time: 02/06/23 10:25 PM   Result Value Ref Range    Ventricular Rate 83 BPM    Atrial Rate 82 BPM    P-R Interval 222 ms    QRS Duration 108 ms    Q-T Interval 372 ms    QTc Calculation (Bazett) 438 ms    P Axis 102 degrees    R Axis 75 degrees    T Axis 79 degrees    Diagnosis Sinus rhythm    Culture, Blood 1    Collection Time: 02/06/23 10:30 PM    Specimen: Blood   Result Value Ref Range    Special Requests LEFT  FOREARM        Culture PENDING    Lactate, Sepsis    Collection Time: 02/06/23 10:30 PM   Result Value Ref Range    Lactic Acid, Sepsis 1.2 0.4 - 2.0 MMOL/L   CBC with Auto Differential    Collection Time: 02/06/23 10:30 PM   Result Value Ref Range    WBC 9.7 4.3 - 11.1 K/uL    RBC 3.23 (L) 4.23 - 5.6 M/uL    Hemoglobin 10.3 (L) 13.6 - 17.2 g/dL    Hematocrit 30.4 (L) 41.1 - 50.3 %    MCV 94.1 82 - 102 FL    MCH 31.9 26.1 - 32.9 PG    MCHC 33.9 31.4 - 35.0 g/dL    RDW 17.2 (H) 11.9 - 14.6 %    Platelets 390 947 - 622 K/uL    MPV 10.9 9.4 - 12.3 FL    nRBC 0.00 0.0 - 0.2 K/uL    Differential Type AUTOMATED      Seg Neutrophils 87 (H) 43 - 78 %    Lymphocytes 5 (L) 13 - 44 %    Monocytes 7 4.0 - 12.0 %    Eosinophils % 0 (L) 0.5 - 7.8 %    Basophils 0 0.0 - 2.0 %    Immature Granulocytes 1 0.0 - 5.0 %    Segs Absolute 8.4 (H) 1.7 - 8.2 K/UL    Absolute Lymph # 0.5 0.5 - 4.6 K/UL    Absolute Mono # 0.6 0.1 - 1.3 K/UL    Absolute Eos # 0.0 0.0 - 0.8 K/UL    Basophils Absolute 0.0 0.0 - 0.2 K/UL    Absolute Immature Granulocyte 0.1 0.0 - 0.5 K/UL   CMP    Collection Time: 02/06/23 10:30 PM   Result Value Ref Range    Sodium 134 133 - 143 mmol/L    Potassium 4.3 3.5 - 5.1 mmol/L    Chloride 104 101 - 110 mmol/L    CO2 24 21 - 32 mmol/L    Anion Gap 6 2 - 11 mmol/L    Glucose 193 (H) 65 - 100 mg/dL    BUN 24 (H) 8 - 23 MG/DL    Creatinine 1.20 0.8 - 1.5 MG/DL    Est, Glom Filt Rate >60 >60 ml/min/1.73m2    Calcium 8.5 8.3 - 10.4 MG/DL    Total Bilirubin 1.2 (H) 0.2 - 1.1 MG/DL    ALT 23 12 - 65 U/L    AST 10 (L) 15 - 37 U/L    Alk Phosphatase 78 50 - 136 U/L    Total Protein 6.6 6.3 - 8.2 g/dL    Albumin 3.1 (L) 3.2 - 4.6 g/dL    Globulin 3.5 2.8 - 4.5 g/dL    Albumin/Globulin Ratio 0.9 0.4 - 1.6     Procalcitonin    Collection Time: 02/06/23 10:30 PM   Result Value Ref Range    Procalcitonin 0.22 0.00 - 0.49 ng/mL   Troponin    Collection Time: 02/06/23 10:30 PM   Result Value Ref Range    Troponin, High Sensitivity 20.7 (H) 0 - 14 pg/mL   Brain Natriuretic Peptide    Collection Time: 02/06/23 10:30 PM   Result Value Ref Range    NT Pro- (H) <450 PG/ML   Arterial Blood Gas, POC    Collection Time: 02/06/23 10:53 PM   Result Value Ref Range    DEVICE NASAL CANNULA      FIO2 60 %    pH, Arterial, POC 7.42 7.35 - 7.45      pCO2, Arterial, POC 34.0 (L) 35 - 45 MMHG    pO2, Arterial, POC 80 75 - 100 MMHG    HCO3, Mixed 21.9 (L) 22 - 26 MMOL/L    SO2c, Arterial, POC 96.0 95 - 98 %    BASE DEFICIT (POC) 2.2 mmol/L    POC Bill's Test Positive      Site RIGHT RADIAL      Specimen type: ARTERIAL      Performed by: Keara     Critical Value Read Back ARACELI        I have personally reviewed imaging studies:  XR CHEST PORTABLE    Result Date: 2/6/2023  EXAMINATION: XR CHEST PORTABLE DATE: 2/6/2023 10:30 PM INDICATION: ; Sepsis COMPARISON:  December 20, 2022 FINDINGS: Moderate right lower lung consolidative opacity is new. Minor background interstitial opacities are redemonstrated. No visible pleural effusion or pneumothorax. Cardiomediastinal silhouette  unchanged. Cardiac device with leads in the right atrium and partly imaged in the right ventricle. 1.  Right lower pneumonia. Recommend chest radiographic follow-up in eight weeks  for resolution.   Ronnie Hernandez M.D. 2/6/2023 11:37:00 PM      Echocardiogram:  12/29/22    TRANSESOPHAGEAL ECHOCARDIOGRAM (CONTRAST/3D PRN) 01/05/2023  4:33 PM, 01/05/2023 12:00 AM (Final)    Interpretation Summary    Left Ventricle: Normal left ventricular systolic function with a visually estimated EF of 55 - 60%. Left ventricle size is normal. Mildly increased wall thickness. Findings consistent with mild concentric hypertrophy. Normal wall motion. Normal diastolic function for age. Mitral Valve: Mild regurgitation with a centrally directed jet. Left Atrium: Left atrium is moderately dilated.    -Atrial paced rhythm noted during the study. No valvular vegetations noted. Signed by: Kristie Agarwal MD on 1/5/2023  4:33 PM, Signed by: Unknown Provider Result on 1/5/2023 12:00 AM        Orders Placed This Encounter   Medications    ibuprofen (ADVIL;MOTRIN) tablet 400 mg    cefTRIAXone (ROCEPHIN) 1,000 mg in sodium chloride 0.9 % 50 mL IVPB (mini-bag)     Order Specific Question:   Antimicrobial Indications     Answer:   Sepsis of Unknown Etiology    vancomycin (VANCOCIN) 2000 mg in 0.9% sodium chloride 500 mL IVPB     Order Specific Question:   Antimicrobial Indications     Answer:   Sepsis of Unknown Etiology    0.9 % sodium chloride bolus    cefTRIAXone (ROCEPHIN) 1,000 mg in sodium chloride 0.9 % 50 mL IVPB (mini-bag)     Order Specific Question:   Antimicrobial Indications     Answer:   Pneumonia (CAP)     Order Specific Question:   CAP duration of therapy     Answer:   5 days    doxycycline (VIBRAMYCIN) 100 mg in sodium chloride 0.9 % 100 mL IVPB (mini-bag)     Order Specific Question:   Antimicrobial Indications     Answer:   Pneumonia (CAP)     Order Specific Question:   CAP duration of therapy     Answer:   5 days         Signed:  Neisha Nick MD    Part of this note may have been written by using a voice dictation software. The note has been proof read but may still contain some grammatical/other typographical errors.

## 2023-02-07 NOTE — PROGRESS NOTES
VANCO DAILY FOLLOW UP NOTE  [address] Pharmacokinetic Monitoring Service - Vancomycin    Consulting Provider: Dr. Noah Milton   Indication: HAP  Target Concentration: Goal AUC/GIOVANNY 400-600 mg*hr/L  Day of Therapy: 2 of 7  Additional Antimicrobials: cefepime    Patient eligible for piperacillin-tazobactam to cefepime auto-substitution per P&T approved protocol? N/A    Pertinent Laboratory Values: Wt Readings from Last 1 Encounters:   02/07/23 236 lb 15.9 oz (107.5 kg)     Temp Readings from Last 1 Encounters:   02/07/23 98.4 °F (36.9 °C) (Oral)     Recent Labs     02/06/23  2230   BUN 24*   CREATININE 1.20   WBC 9.7   PROCAL 0.22     Estimated Creatinine Clearance: 65 mL/min (based on SCr of 1.2 mg/dL). Lab Results   Component Value Date/Time    VANCOTROUGH 12.2 09/07/2022 09:15 AM    VANCORANDOM 13.4 01/04/2023 06:00 AM       MRSA Nasal Swab: not ordered. Order placed by pharmacy      Assessment:  Date/Time Dose Concentration AUC         Note: Serum concentrations collected for AUC dosing may appear elevated if collected in close proximity to the dose administered, this is not necessarily an indication of toxicity    Plan:  Dosing recommendations based on Bayesian software  Start vancomycin 1500 mg IV q24h  Anticipated AUC of 468 and trough concentration of 14.1 at steady state  Renal labs as indicated   Vancomycin concentration ordered for 2/8 @ 0400    Pharmacy will continue to monitor patient and adjust therapy as indicated    Thank you for the consult,  Brenda Vallejo, [facility]

## 2023-02-07 NOTE — CARE COORDINATION
MSN, CM:  spoke with patient this afternoon about discharge planning. Patient lives with his wife in own home with a ramp for entrance. Patient is independent with all ADL's and requires a cane, rollator, or W/C for ambulation. Patient is current with [facility] and has outpatient wound care for foot wound. PT/OT consulted for evaluation and recommendations. Patient and wife are agreeable to continue [facility]. Patient has been to [facility], [facility], and [facility]. PT/OT consulted for evaluation and recommendations. Case Management will continue to follow for any discharge needs. 02/07/23 1496   Service Assessment   Patient Orientation Alert and Oriented   Cognition Alert   History Provided By Patient   Primary Caregiver Self   Accompanied By/Relationship Wife   Support Systems Spouse/Significant Other   Patient's Healthcare Decision Maker is: Legal Next of Kin  (Wife)   PCP Verified by CM Yes   Last Visit to PCP Within last 3 months   Prior Functional Level Independent in ADLs/IADLs   Current Functional Level Other (see comment)  (PT/OT consulted)   Can patient return to prior living arrangement Yes   Ability to make needs known: Good   Family able to assist with home care needs: Yes   Would you like for me to discuss the discharge plan with any other family members/significant others, and if so, who? Yes   Financial Resources Medicare  (United - Secondary)   Community Resources None   Social/Functional History   Lives With Spouse   Type of [address] Two level   Home Access Ramped entrance   Bathroom Shower/Tub Tub/Shower unit   [address] bars in shower; Toilet raiser   [address] Cane;Rollator; [address] Help From Family   ADL Assistance Independent   Homemaking Assistance Independent   Homemaking Responsibilities Yes   Ambulation Assistance Independent   Transfer Assistance Independent   Active  No   Patient's  Info Wife   Mode of Transportation Car   Occupation Retired   Discharge Planning   Type of [address] Prior To Admission [address]  ([facility])   [address]   DME Ordered? No   Potential Assistance Purchasing Medications No   Type of Home Care Services None   Patient expects to be discharged to: House   One/Two Story Residence Two story   History of falls?  1

## 2023-02-08 LAB
ANION GAP SERPL CALC-SCNC: 8 MMOL/L (ref 2–11)
BASOPHILS # BLD: 0 K/UL (ref 0–0.2)
BASOPHILS NFR BLD: 0 % (ref 0–2)
BUN SERPL-MCNC: 18 MG/DL (ref 8–23)
CALCIUM SERPL-MCNC: 8.3 MG/DL (ref 8.3–10.4)
CHLORIDE SERPL-SCNC: 109 MMOL/L (ref 101–110)
CO2 SERPL-SCNC: 23 MMOL/L (ref 21–32)
CREAT SERPL-MCNC: 0.9 MG/DL (ref 0.8–1.5)
DIFFERENTIAL METHOD BLD: ABNORMAL
EOSINOPHIL # BLD: 0 K/UL (ref 0–0.8)
EOSINOPHIL NFR BLD: 0 % (ref 0.5–7.8)
ERYTHROCYTE [DISTWIDTH] IN BLOOD BY AUTOMATED COUNT: 17.3 % (ref 11.9–14.6)
GLUCOSE BLD STRIP.AUTO-MCNC: 132 MG/DL (ref 65–100)
GLUCOSE BLD STRIP.AUTO-MCNC: 152 MG/DL (ref 65–100)
GLUCOSE BLD STRIP.AUTO-MCNC: 157 MG/DL (ref 65–100)
GLUCOSE BLD STRIP.AUTO-MCNC: 169 MG/DL (ref 65–100)
GLUCOSE SERPL-MCNC: 115 MG/DL (ref 65–100)
HCT VFR BLD AUTO: 25.1 % (ref 41.1–50.3)
HGB BLD-MCNC: 8.1 G/DL (ref 13.6–17.2)
IMM GRANULOCYTES # BLD AUTO: 0.1 K/UL (ref 0–0.5)
IMM GRANULOCYTES NFR BLD AUTO: 1 % (ref 0–5)
LYMPHOCYTES # BLD: 1 K/UL (ref 0.5–4.6)
LYMPHOCYTES NFR BLD: 10 % (ref 13–44)
MCH RBC QN AUTO: 31.4 PG (ref 26.1–32.9)
MCHC RBC AUTO-ENTMCNC: 32.3 G/DL (ref 31.4–35)
MCV RBC AUTO: 97.3 FL (ref 82–102)
MONOCYTES # BLD: 0.9 K/UL (ref 0.1–1.3)
MONOCYTES NFR BLD: 9 % (ref 4–12)
NEUTS SEG # BLD: 8 K/UL (ref 1.7–8.2)
NEUTS SEG NFR BLD: 80 % (ref 43–78)
NRBC # BLD: 0 K/UL (ref 0–0.2)
PLATELET # BLD AUTO: 146 K/UL (ref 150–450)
PMV BLD AUTO: 11.2 FL (ref 9.4–12.3)
POTASSIUM SERPL-SCNC: 3.7 MMOL/L (ref 3.5–5.1)
RBC # BLD AUTO: 2.58 M/UL (ref 4.23–5.6)
SERVICE CMNT-IMP: ABNORMAL
SODIUM SERPL-SCNC: 140 MMOL/L (ref 133–143)
VANCOMYCIN SERPL-MCNC: 16.5 UG/ML
WBC # BLD AUTO: 10 K/UL (ref 4.3–11.1)

## 2023-02-08 PROCEDURE — 6360000002 HC RX W HCPCS: Performed by: HOSPITALIST

## 2023-02-08 PROCEDURE — 94640 AIRWAY INHALATION TREATMENT: CPT

## 2023-02-08 PROCEDURE — 87070 CULTURE OTHR SPECIMN AEROBIC: CPT

## 2023-02-08 PROCEDURE — 6370000000 HC RX 637 (ALT 250 FOR IP): Performed by: INTERNAL MEDICINE

## 2023-02-08 PROCEDURE — 1100000000 HC RM PRIVATE

## 2023-02-08 PROCEDURE — 6370000000 HC RX 637 (ALT 250 FOR IP): Performed by: HOSPITALIST

## 2023-02-08 PROCEDURE — 2580000003 HC RX 258: Performed by: NURSE PRACTITIONER

## 2023-02-08 PROCEDURE — 80202 ASSAY OF VANCOMYCIN: CPT

## 2023-02-08 PROCEDURE — 94760 N-INVAS EAR/PLS OXIMETRY 1: CPT

## 2023-02-08 PROCEDURE — 36415 COLL VENOUS BLD VENIPUNCTURE: CPT

## 2023-02-08 PROCEDURE — 94761 N-INVAS EAR/PLS OXIMETRY MLT: CPT

## 2023-02-08 PROCEDURE — 2580000003 HC RX 258: Performed by: HOSPITALIST

## 2023-02-08 PROCEDURE — 6360000002 HC RX W HCPCS: Performed by: NURSE PRACTITIONER

## 2023-02-08 PROCEDURE — 80048 BASIC METABOLIC PNL TOTAL CA: CPT

## 2023-02-08 PROCEDURE — 97530 THERAPEUTIC ACTIVITIES: CPT

## 2023-02-08 PROCEDURE — 85025 COMPLETE CBC W/AUTO DIFF WBC: CPT

## 2023-02-08 PROCEDURE — 2580000003 HC RX 258: Performed by: INTERNAL MEDICINE

## 2023-02-08 PROCEDURE — 82962 GLUCOSE BLOOD TEST: CPT

## 2023-02-08 RX ORDER — FERROUS SULFATE 325(65) MG
325 TABLET ORAL
Status: DISCONTINUED | OUTPATIENT
Start: 2023-02-09 | End: 2023-02-09 | Stop reason: HOSPADM

## 2023-02-08 RX ADMIN — APIXABAN 5 MG: 5 TABLET, FILM COATED ORAL at 21:09

## 2023-02-08 RX ADMIN — CEFEPIME 2000 MG: 2 INJECTION, POWDER, FOR SOLUTION INTRAVENOUS at 08:25

## 2023-02-08 RX ADMIN — CARVEDILOL 6.25 MG: 3.12 TABLET, FILM COATED ORAL at 08:19

## 2023-02-08 RX ADMIN — AMIODARONE HYDROCHLORIDE 200 MG: 100 TABLET ORAL at 08:25

## 2023-02-08 RX ADMIN — ROSUVASTATIN CALCIUM 10 MG: 20 TABLET, COATED ORAL at 21:09

## 2023-02-08 RX ADMIN — SODIUM CHLORIDE, PRESERVATIVE FREE 10 ML: 5 INJECTION INTRAVENOUS at 21:09

## 2023-02-08 RX ADMIN — GUAIFENESIN 600 MG: 600 TABLET ORAL at 21:09

## 2023-02-08 RX ADMIN — APIXABAN 5 MG: 5 TABLET, FILM COATED ORAL at 08:19

## 2023-02-08 RX ADMIN — CEFEPIME 2000 MG: 2 INJECTION, POWDER, FOR SOLUTION INTRAVENOUS at 00:23

## 2023-02-08 RX ADMIN — BUDESONIDE 500 MCG: 0.5 INHALANT RESPIRATORY (INHALATION) at 07:22

## 2023-02-08 RX ADMIN — LEVALBUTEROL HYDROCHLORIDE 0.63 MG: 0.63 SOLUTION RESPIRATORY (INHALATION) at 14:39

## 2023-02-08 RX ADMIN — CARVEDILOL 6.25 MG: 3.12 TABLET, FILM COATED ORAL at 16:14

## 2023-02-08 RX ADMIN — CLOPIDOGREL BISULFATE 75 MG: 75 TABLET ORAL at 08:19

## 2023-02-08 RX ADMIN — SODIUM CHLORIDE, PRESERVATIVE FREE 10 ML: 5 INJECTION INTRAVENOUS at 08:25

## 2023-02-08 RX ADMIN — LEVALBUTEROL HYDROCHLORIDE 0.63 MG: 0.63 SOLUTION RESPIRATORY (INHALATION) at 07:22

## 2023-02-08 RX ADMIN — FUROSEMIDE 20 MG: 20 TABLET ORAL at 08:24

## 2023-02-08 RX ADMIN — VANCOMYCIN HYDROCHLORIDE 1500 MG: 10 INJECTION, POWDER, LYOPHILIZED, FOR SOLUTION INTRAVENOUS at 16:14

## 2023-02-08 RX ADMIN — BUDESONIDE 500 MCG: 0.5 INHALANT RESPIRATORY (INHALATION) at 20:04

## 2023-02-08 RX ADMIN — LEVALBUTEROL HYDROCHLORIDE 0.63 MG: 0.63 SOLUTION RESPIRATORY (INHALATION) at 20:04

## 2023-02-08 RX ADMIN — GUAIFENESIN 600 MG: 600 TABLET ORAL at 08:19

## 2023-02-08 RX ADMIN — LATANOPROST 1 DROP: 50 SOLUTION OPHTHALMIC at 21:09

## 2023-02-08 RX ADMIN — CEFEPIME 2000 MG: 2 INJECTION, POWDER, FOR SOLUTION INTRAVENOUS at 23:54

## 2023-02-08 RX ADMIN — CEFEPIME 2000 MG: 2 INJECTION, POWDER, FOR SOLUTION INTRAVENOUS at 17:52

## 2023-02-08 ASSESSMENT — PAIN SCALES - GENERAL: PAINLEVEL_OUTOF10: 0

## 2023-02-08 NOTE — CARE COORDINATION
MSN, CM:  patient continues to require IV abx. Patient's discharge plan is home with [facility] and home Palliative Care when medically stable. Case Management will continue to follow.

## 2023-02-08 NOTE — PROGRESS NOTES
Pt up in chair. No s/sx of distress noted. Denies any pain. Call light within reach. Will continue to monitor.

## 2023-02-08 NOTE — PROGRESS NOTES
Comprehensive Nutrition Assessment    Type and Reason for Visit: Initial, Positive Nutrition Screen  Malnutrition Screening Tool: Malnutrition Screen  Have you recently lost weight without trying?: 34 or more pounds (4 points)  Have you been eating poorly because of a decreased appetite?: No (0 points)  Malnutrition Screening Tool Score: 4    Nutrition Recommendations/Plan:   Meals and Snacks:  Diet: Continue current order  Nutrition Supplement Therapy:  Medical food supplement therapy:  Modify Glucerna Shake once per day (this provides 220 kcal and 10 grams protein per bottle)     Malnutrition Assessment:  Malnutrition Status: No malnutrition  No joe wasting of muscle of fat stores noted    Nutrition Assessment:  Nutrition History: Hx provided primarily by wife. She reports pt had lost a lot of weight throughout 2022 d/t hospitalizations, but has been able to gain some weight back since. EMR parallels this. Pt started 2022 at 293 lbs (3/1/2022), then dropped down to 224 lbs on 11/3/2022, and now is 241 lbs. Pt states he has been eating 2 meals/day at home at baseline. Wife states that ~1 week ago pt started struggling with poor appetite and was only eating 1 meal/day. Do You Have Any Cultural, Roman Catholic, or Ethnic Food Preferences?: No   Nutrition Background:       PMH significant for ischemic CM, chronic sCHF, SHERI, PAD, COPD, HTN, DM2, HLD, and a fib. Pt presents this admission for cough, congestion, and lethargy. Suspected pneumonia. Nutrition Interval:  RD met w/pt and wife in room. Pt reports his appetite has improved but he does not like the hospital food. Reports eating 25-50% of his breakfast. States he is a relatively picky eater at baseline and does not always eat all the food his wife makes either. He is agreeable to only one glucerna daily because he is \"sick of them\" from previous hospital admission.  RD stressed the importance of adequate calorie/protein intake for malnutrition prevention and encouraged pt to have wife bring food in from outside the hospital if necessary. Wife states she has been intermittently bringing food in for patient and can continue to do so if he requests. Current Nutrition Therapies:  ADULT DIET; Regular; 4 carb choices (60 gm/meal)  ADULT ORAL NUTRITION SUPPLEMENT; Breakfast; Diabetic Oral Supplement    Current Intake:   Average Meal Intake: 26-50% (per patient and wife) Average Supplements Intake: 0%      Anthropometric Measures:  Height: 6' 1\" (185.4 cm)  Current Body Wt: 241 lb 10 oz (109.6 kg) (2/8), Weight source: Bed Scale  BMI: 31.9, Obese Class 1 (BMI 30.0-34. 9)  Admission Body Weight: 236 lb 15.9 oz (107.5 kg)  Ideal Body Weight (Kg) (Calculated): 84 kg (184 lbs), 131.3 %  Usual Body Wt:  , Percent weight change:         BMI Category Obese Class 1 (BMI 30.0-34. 9)    Estimated Daily Nutrient Needs:  Energy (kcal/day): 7406-3551 (15-20 kcal/kg) (Kcal/kg Weight used: 109.6 kg Current  Protein (g/day):  (0.8-1 g/kg) Weight Used: (Current) 109.6 kg  Fluid (ml/day):   (1 ml/kcal)    Nutrition Diagnosis:   Inadequate oral intake related to  (poor appetite and dislike of hospital food) as evidenced by intake 26-50% (reported barriers to increased po intake)    Nutrition Interventions:   Food and/or Nutrient Delivery: Continue Current Diet, Modify Oral Nutrition Supplement  Nutrition Education/Counseling: No recommendation at this time  Coordination of Nutrition Care: Continue to monitor while inpatient  Plan of Care discussed with: patient, wife    Goals:       Active Goal: Meet at least 75% of estimated needs, by next RD assessment       Nutrition Monitoring and Evaluation:      Food/Nutrient Intake Outcomes: Food and Nutrient Intake, Supplement Intake  Physical Signs/Symptoms Outcomes: Weight, Meal Time Behavior    Discharge Planning:    Continue current diet    Meg Malcolm RD

## 2023-02-08 NOTE — PROGRESS NOTES
VANCO DAILY FOLLOW UP NOTE  [facility] Pharmacokinetic Monitoring Service - Vancomycin    Consulting Provider: Dr. Jhon Farley   Indication: HAP  Target Concentration: Goal AUC/GIOVANNY 400-600 mg*hr/L  Day of Therapy: 3 of 7  Additional Antimicrobials: cefepime    Patient eligible for piperacillin-tazobactam to cefepime auto-substitution per P&T approved protocol? N/A    Pertinent Laboratory Values: Wt Readings from Last 1 Encounters:   02/08/23 241 lb 10 oz (109.6 kg)     Temp Readings from Last 1 Encounters:   02/08/23 97.5 °F (36.4 °C) (Oral)     Recent Labs     02/06/23  2230 02/08/23  0437   BUN 24* 18   CREATININE 1.20 0.90   WBC 9.7 10.0   PROCAL 0.22  --      Estimated Creatinine Clearance: 88 mL/min (based on SCr of 0.9 mg/dL). Lab Results   Component Value Date/Time    VANCOTROUGH 12.2 09/07/2022 09:15 AM    VANCORANDOM 16.5 02/08/2023 04:37 AM       MRSA Nasal Swab: not ordered. Order placed by pharmacy      Assessment:  Date/Time Dose Concentration AUC   2/8 0437 1500 mg q24h 16.5 373   Note: Serum concentrations collected for AUC dosing may appear elevated if collected in close proximity to the dose administered, this is not necessarily an indication of toxicity    Plan:  Current dosing regimen is sub-therapeutic  Increase dose to 1500 mg IV q18h for predicted AUC/Tr of 489/15.5  Repeat vancomycin concentrations will be ordered as clinically appropriate   Pharmacy will continue to monitor patient and adjust therapy as indicated    Thank you for the consult,  Amanda Soriano, [facility]

## 2023-02-08 NOTE — PROGRESS NOTES
ACUTE PHYSICAL THERAPY GOALS:   (Developed with and agreed upon by patient and/or caregiver. )  LTG:  (1.)Mr. Sharon Rai will move from supine to sit and sit to supine , scoot up and down, and roll side to side in bed with MODIFIED INDEPENDENCE within 7 treatment day(s). (2.)Mr. Sharon Rai will transfer from bed to chair and chair to bed with MODIFIED INDEPENDENCE using the least restrictive device within 7 treatment day(s). (3.)Mr. Sharon Rai will ambulate with MODIFIED INDEPENDENCE for 250 feet with the least restrictive device within 7 treatment day(s). (4.)Mr. Sharon Rai will perform exercises per HEP independently to improve strength and mobility within 7 treatment day(s). PHYSICAL THERAPY: Daily Note AM   (Link to Caseload Tracking: PT Visit Days : 2  Time In/Out PT Charge Capture  Rehab Caseload Tracker  Orders    Opal Gonzalez is a 65 y.o. male   PRIMARY DIAGNOSIS: Acute respiratory failure with hypoxia ([address]  Acute respiratory failure with hypoxia (HCC) [J96.01]  Altered mental status, unspecified altered mental status type [R41.82]  Pneumonia of right lower lobe due to infectious organism [J18.9]       Inpatient: Payor: MEDICARE / Plan: MEDICARE PART A AND B / Product Type: *No Product type* /     ASSESSMENT:     REHAB RECOMMENDATIONS:   Recommendation to date pending progress:  Setting:  Home Health Therapy    Equipment:    To Be Determined     ASSESSMENT:  Mr. Sharon Rai presents supine and is agreeable to therapy. He sat to side of bed with CGA. He stood and ambulated in room using rolling walker and CGA/min assist.  He rested in chair and then ambulated another 40' twice using rolling walker and CGA/min assist with chair follow. As he fatigued he did require min assist for ambulation and he became more flexed in his posture. He returned to room and was left up with needs in reach. He is making progress towards goals. Will continue with POC. SUBJECTIVE:   Mr. 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 \"     Social/Functional Lives With: Spouse  Type of Home: House  Home Layout: Two level  Home Access: Ramped entrance  Bathroom Shower/Tub: Tub/Shower unit  Bathroom Toilet: Standard  Bathroom Equipment: Grab bars in shower, Toilet raiser  Bathroom Accessibility: Accessible  Home Equipment: [address] Help From: Family  ADL Assistance: Independent  Homemaking Assistance: Independent  Homemaking Responsibilities: Yes  Ambulation Assistance: Independent  Transfer Assistance: Independent  Active : No  Patient's  Info: Wife  Mode of Transportation: Car  Occupation: Retired  OBJECTIVE:     PAIN: VITALS / O2: PRECAUTION / Errol Jazz / [place]:   Pre Treatment: 0         Post Treatment: 0 Vitals        Oxygen    IV    RESTRICTIONS/PRECAUTIONS:        MOBILITY: I Mod I S SBA CGA Min Mod Max Total  NT x2 Comments:   Bed Mobility    Rolling [] [] [] [] [] [] [] [] [] [] []    Supine to Sit [] [] [] [] [x] [] [] [] [] [] []    Scooting [] [] [] [] [x] [] [] [] [] [] []    Sit to Supine [] [] [] [] [] [] [] [] [] [] []    Transfers    Sit to Stand [] [] [] [] [x] [] [] [] [] [] []    Bed to Chair [] [] [] [] [x] [x] [] [] [] [] []    Stand to Sit [] [] [] [] [x] [] [] [] [] [] []     [] [] [] [] [] [] [] [] [] [] []    I=Independent, Mod I=Modified Independent, S=Supervision, SBA=Standby Assistance, CGA=Contact Guard Assistance,   Min=Minimal Assistance, Mod=Moderate Assistance, Max=Maximal Assistance, Total=Total Assistance, NT=Not Tested    BALANCE: Good Fair+ Fair Fair- Poor NT Comments   Sitting Static [x] [] [] [] [] []    Sitting Dynamic [x] [] [] [] [] []              Standing Static [] [x] [] [] [] []    Standing Dynamic [] [x] [x] [] [] []      GAIT: I Mod I S SBA CGA Min Mod Max Total  NT x2 Comments:   Level of Assistance [] [] [] [] [x] [x] [] [] [] [] [] Chair follow for safety, as he fatigues requires more assist ([address]).    Distance 40' x 2, 10'    DME Rolling Edwige Just Gait Quality Decreased paul , Decreased step clearance, Trunk sway increased, and Wide base of support    Weightbearing Status      Stairs      I=Independent, Mod I=Modified Independent, S=Supervision, SBA=Standby Assistance, CGA=Contact Guard Assistance,   Min=Minimal Assistance, Mod=Moderate Assistance, Max=Maximal Assistance, Total=Total Assistance, NT=Not Tested    PLAN:   FREQUENCY AND DURATION: 3 times/week for duration of hospital stay or until stated goals are met, whichever comes first.    TREATMENT:   TREATMENT:   Therapeutic Activity (28 Minutes): Therapeutic activity included Supine to Sit, Scooting, Ambulation on level ground, Sitting balance , and Standing balance to improve functional Activity tolerance, Balance, Coordination, Mobility, and Strength.     TREATMENT GRID:  N/A    AFTER TREATMENT PRECAUTIONS: Bed/Chair Locked, Call light within reach, Chair, Needs within reach, RN notified, and Visitors at bedside    INTERDISCIPLINARY COLLABORATION:  RN/ PCT and PT/ PTA    EDUCATION:      TIME IN/OUT:  Time In: 0920  Time Out: 3025  Minutes: Asad Nino PTA

## 2023-02-08 NOTE — PROGRESS NOTES
Pt resting in bed awake. Alert and oriented times 3 at this time. Pt found to be on RA by RT instructor and students. O2 sat 97% on RA. Pt with no s/sx of distress noted. Denies any pain at this time. Encouraged to call for assistance as needed. Call light within reach. Will continue to monitor.

## 2023-02-08 NOTE — PROGRESS NOTES
Report received from off going RN. Patient resting in bed with door to room open. PAP in  use with oxygen bleed in at 4LPM.   Patient with no concerns or needs. Respirations regular and unlabored. Will continue to monitor. Safety measures in place.

## 2023-02-08 NOTE — PROGRESS NOTES
Patient resting in bed with CPAP in use, 4lpm bleed in. NAD noted. Respirations regular and unlabored. Will continue to monitor. Preparing BSR to oncoming RN.

## 2023-02-08 NOTE — WOUND CARE
Discussed wound Dr. Elizabeth Alvarado and Patient with spouse. Dr. Elizabeth Alvarado has been using purply applied weekly and the next application is due tomorrow. Family and Patient notified that assessing the wound ordered by the hospital doctor and that the purply could be left until tomorrow or I could remove and assess today that Dr. Elizabeth Alvarado had agreed to use opticel ag until wound follow up at wound clinic. Patient chose to have the wound assessed today, wound base pale granulation slight improvement from picture 1/26/23. Opticel ag and dry dressing with camila and coban applied today, plan to change Monday if patient is still inpatient. Will monitor.

## 2023-02-08 NOTE — PROGRESS NOTES
Hospitalist Progress Note   Admit Date:  2023 10:09 PM   Name:  Rigo Nichols   Age:  65 y.o. Sex:  male  :  1945   MRN:  [id number]   Room:      Presenting Complaint: Fever     Reason(s) for Admission: Acute respiratory failure with hypoxia ([facility] [J96.01]  Altered mental status, unspecified altered mental status type [R41.82]  Pneumonia of right lower lobe due to infectious organism [J18.9]     Hospital Course: Rigo Nichols is a 65 y.o. male medical history of ischemic CM, chronic sCHF, SHERI, PAD, COPD, HTN, DM2, HLD and atrial fib admitted on  with acute respiratory failure secondary to right lower lobe hospital-acquired pneumonia. Fever resolved  and patient was doing well but then became lethargic and sleeping more. Upon EMS evaluation, patient was hypoxic to 81% on room air requiring nonrebreather in route to the hospital.      In the ER patient was febrile to 102.2 °F, hypoxic on room air with saturation of  81%. Laboratory work-up notable for proBNP of 616, hemoglobin 10.3. Chest x-ray with right lower lobe pneumonia. Procalcitonin 0.22      Subjective & 24hr Events (23): Denies SOB but endorses a poor appetite. Believes this is 2/2 food choices and not actual poor appetite. Denies any nausea vomiting, chest pain, palpitations, abdominal pain, diarrhea      Assessment & Plan:     Principal Problem:    Acute respiratory failure with hypoxia (HCC)   Right lower lobe hospital-acquired pneumonia  Plan:   -Weaned from 5L to 4L; Continue to wean as able   -Continue Xopenex nebs and Pulmicort nebs/Mucinex  -Patient now on Cefepime and Vanc. Deescalate with cultures   -Bcx NGTD; MRSA pending   -PT/OT    Active Problems:    ICD (implantable cardioverter-defibrillator) in place  Plan:   -Noted. Last interrogated 10/2022      Anemia  Plan:   -Normocytic Anemia.  No obvious bleeding    -Recent anemia labs showing RAVINDRA  -FE supplement             Atrial fibrillation ([facility]  Plan:   -NSR. Continue Coreg/Eliquis       HTN (hypertension)  Plan:   -Stable; Continue current management       COPD (chronic obstructive pulmonary disease) ([facility])  Plan:   -Not in exacerbation. No wheezing noted on exam       Diabetic neuropathy (HCC)  Plan:   -Noted      Coronary artery disease involving native coronary artery of native heart without angina pectoris  Plan:   -Statin      PAD (peripheral artery disease) ([facility]  Plan:   -Eliquis/Statin        DM (diabetes mellitus) type II, controlled, with peripheral vascular disorder ([facility]  Plan:   -Accu checks with SSI  -Ranges acceptable      Anticipated discharge needs:      Dispo pending     Diet:  ADULT DIET; Regular; 4 carb choices (60 gm/meal)  ADULT ORAL NUTRITION SUPPLEMENT; Breakfast; Diabetic Oral Supplement  DVT PPx: Eliquis  Code status: Full Code    Hospital Problems:  Principal Problem:    Acute respiratory failure with hypoxia ([facility])  Active Problems:    ICD (implantable cardioverter-defibrillator) in place    Anemia    Hospital-acquired bacterial pneumonia    Atrial fibrillation ([facility])    HTN (hypertension)    COPD (chronic obstructive pulmonary disease) ([facility])    Diabetic neuropathy ([facility])    Coronary artery disease involving native coronary artery of native heart without angina pectoris    PAD (peripheral artery disease) ([facility])    S/P angioplasty with stent    DM (diabetes mellitus) type II, controlled, with peripheral vascular disorder ([place] [address]  Resolved Problems:    * No resolved hospital problems.  *      Objective:   Patient Vitals for the past 24 hrs:   Temp Pulse Resp BP SpO2   02/08/23 1055 98.6 °F (37 °C) 71 18 (!) 112/59 94 %   02/08/23 0735 97.5 °F (36.4 °C) 71 14 127/72 97 %   02/08/23 0724 -- 75 14 -- 92 %   02/08/23 0411 99.7 °F (37.6 °C) 72 19 (!) 131/53 97 %   02/07/23 2337 99.5 °F (37.5 °C) 66 19 87/78 93 %   02/07/23 2032 -- -- 16 -- --   02/07/23 1955 -- 67 16 -- 97 %   02/07/23 1933 99.9 °F (37.7 °C) 66 19 130/63 98 %   02/07/23 1545 -- -- -- -- 93 %   02/07/23 1533 98.4 °F (36.9 °C) 71 16 99/82 92 %   02/07/23 1457 -- -- -- -- 94 %   02/07/23 1445 -- -- -- -- 94 %       Oxygen Therapy  SpO2: 94 %  Pulse via Oximetry: 73 beats per minute  Pulse Oximeter Device Mode: Intermittent  O2 Device: None (Room air) (SPO2 levels 92% on RA, left patient off O2)  O2 Flow Rate (L/min): 4 L/min  Blood Gas  Performed?: Yes  Bill's Test #1: Collateral flow confirmed  Site #1: Arterial line  Site Prepped #1: Yes  Number of Attempts #1: 1  Pressure Held #1: Yes  Complications #1: None  Post-procedure #1: Standard  Specimen Status #1: Point of care  How Tolerated?: Tolerated well    Estimated body mass index is 31.88 kg/m² as calculated from the following:    Height as of this encounter: 6' 1\" (1.854 m). Weight as of this encounter: 241 lb 10 oz (109.6 kg). Intake/Output Summary (Last 24 hours) at 2/8/2023 1236  Last data filed at 2/8/2023 0645  Gross per 24 hour   Intake 10 ml   Output 925 ml   Net -915 ml         Physical Exam:     Blood pressure (!) 112/59, pulse 71, temperature 98.6 °F (37 °C), temperature source Oral, resp. rate 18, height 6' 1\" (1.854 m), weight 241 lb 10 oz (109.6 kg), SpO2 94 %. General:    Well nourished. Head:  Normocephalic, atraumatic  Eyes:  Sclerae appear normal.  Pupils equally round. ENT:  Nares appear normal.  Moist oral mucosa  Neck:  No restricted ROM. Trachea midline   CV:   RRR. No m/r/g. No jugular venous distension. Lungs:   CTAB. No wheezing, rhonchi, or rales. Symmetric expansion. Abdomen:   Soft, nontender, nondistended. Extremities: No cyanosis or clubbing. No edema  Skin:     No rashes and normal coloration. Warm and dry. Neuro:  CN II-XII grossly intact. Psych:  Normal mood and affect.       I have personally reviewed labs and tests:  Recent Labs:  Recent Results (from the past 48 hour(s))   EKG 12 Lead    Collection Time: 02/06/23 10:25 PM   Result Value Ref Range    Ventricular Rate 83 BPM    Atrial Rate 82 BPM    P-R Interval 222 ms    QRS Duration 108 ms    Q-T Interval 372 ms    QTc Calculation (Bazett) 438 ms    P Axis 102 degrees    R Axis 75 degrees    T Axis 79 degrees    Diagnosis Sinus rhythm    Culture, Blood 1    Collection Time: 02/06/23 10:30 PM    Specimen: Blood   Result Value Ref Range    Special Requests LEFT  FOREARM        Culture NO GROWTH AFTER 9 HOURS     Lactate, Sepsis    Collection Time: 02/06/23 10:30 PM   Result Value Ref Range    Lactic Acid, Sepsis 1.2 0.4 - 2.0 MMOL/L   CBC with Auto Differential    Collection Time: 02/06/23 10:30 PM   Result Value Ref Range    WBC 9.7 4.3 - 11.1 K/uL    RBC 3.23 (L) 4.23 - 5.6 M/uL    Hemoglobin 10.3 (L) 13.6 - 17.2 g/dL    Hematocrit 30.4 (L) 41.1 - 50.3 %    MCV 94.1 82 - 102 FL    MCH 31.9 26.1 - 32.9 PG    MCHC 33.9 31.4 - 35.0 g/dL    RDW 17.2 (H) 11.9 - 14.6 %    Platelets 975 440 - 656 K/uL    MPV 10.9 9.4 - 12.3 FL    nRBC 0.00 0.0 - 0.2 K/uL    Differential Type AUTOMATED      Seg Neutrophils 87 (H) 43 - 78 %    Lymphocytes 5 (L) 13 - 44 %    Monocytes 7 4.0 - 12.0 %    Eosinophils % 0 (L) 0.5 - 7.8 %    Basophils 0 0.0 - 2.0 %    Immature Granulocytes 1 0.0 - 5.0 %    Segs Absolute 8.4 (H) 1.7 - 8.2 K/UL    Absolute Lymph # 0.5 0.5 - 4.6 K/UL    Absolute Mono # 0.6 0.1 - 1.3 K/UL    Absolute Eos # 0.0 0.0 - 0.8 K/UL    Basophils Absolute 0.0 0.0 - 0.2 K/UL    Absolute Immature Granulocyte 0.1 0.0 - 0.5 K/UL   CMP    Collection Time: 02/06/23 10:30 PM   Result Value Ref Range    Sodium 134 133 - 143 mmol/L    Potassium 4.3 3.5 - 5.1 mmol/L    Chloride 104 101 - 110 mmol/L    CO2 24 21 - 32 mmol/L    Anion Gap 6 2 - 11 mmol/L    Glucose 193 (H) 65 - 100 mg/dL    BUN 24 (H) 8 - 23 MG/DL    Creatinine 1.20 0.8 - 1.5 MG/DL    Est, Glom Filt Rate >60 >60 ml/min/1.73m2    Calcium 8.5 8.3 - 10.4 MG/DL    Total Bilirubin 1.2 (H) 0.2 - 1.1 MG/DL    ALT 23 12 - 65 U/L    AST 10 (L) 15 - 37 U/L    Alk Phosphatase 78 50 - 136 U/L Total Protein 6.6 6.3 - 8.2 g/dL    Albumin 3.1 (L) 3.2 - 4.6 g/dL    Globulin 3.5 2.8 - 4.5 g/dL    Albumin/Globulin Ratio 0.9 0.4 - 1.6     Procalcitonin    Collection Time: 02/06/23 10:30 PM   Result Value Ref Range    Procalcitonin 0.22 0.00 - 0.49 ng/mL   Troponin    Collection Time: 02/06/23 10:30 PM   Result Value Ref Range    Troponin, High Sensitivity 20.7 (H) 0 - 14 pg/mL   Brain Natriuretic Peptide    Collection Time: 02/06/23 10:30 PM   Result Value Ref Range    NT Pro- (H) <450 PG/ML   Arterial Blood Gas, POC    Collection Time: 02/06/23 10:53 PM   Result Value Ref Range    DEVICE NASAL CANNULA      FIO2 60 %    pH, Arterial, POC 7.42 7.35 - 7.45      pCO2, Arterial, POC 34.0 (L) 35 - 45 MMHG    pO2, Arterial, POC 80 75 - 100 MMHG    HCO3, Mixed 21.9 (L) 22 - 26 MMOL/L    SO2c, Arterial, POC 96.0 95 - 98 %    BASE DEFICIT (POC) 2.2 mmol/L    POC Bill's Test Positive      Site RIGHT RADIAL      Specimen type: ARTERIAL      Performed by: Keara     Critical Value Read Back ARACELI    Culture, Blood 2    Collection Time: 02/07/23 12:44 AM    Specimen: Blood   Result Value Ref Range    Special Requests LEFT  ARM        Culture PENDING    COVID-19, Rapid    Collection Time: 02/07/23  1:38 AM    Specimen: Nasopharyngeal   Result Value Ref Range    Source NASAL      SARS-CoV-2, Rapid Not detected NOTD     Influenza A/B, Molecular    Collection Time: 02/07/23  1:38 AM    Specimen: Not Specified   Result Value Ref Range    Influenza A, TULIO Not detected NOTD      Influenza B, TULIO Not detected NOTD     Lactate, Sepsis    Collection Time: 02/07/23  4:19 AM   Result Value Ref Range    Lactic Acid, Sepsis 1.2 0.4 - 2.0 MMOL/L   POCT Glucose    Collection Time: 02/07/23  8:18 AM   Result Value Ref Range    POC Glucose 123 (H) 65 - 100 mg/dL    Performed by: Alok    MSSA/MRSA Screen BY PCR    Collection Time: 02/07/23  9:34 AM    Specimen: Nares;  Swab   Result Value Ref Range Special Requests NO SPECIAL REQUESTS      Culture        SA target not detected. A MRSA NEGATIVE, SA NEGATIVE test result does not preclude MRSA or SA nasal colonization.    POCT Glucose    Collection Time: 02/07/23 11:26 AM   Result Value Ref Range    POC Glucose 186 (H) 65 - 100 mg/dL    Performed by: Annelise    POCT Glucose    Collection Time: 02/07/23  4:19 PM   Result Value Ref Range    POC Glucose 149 (H) 65 - 100 mg/dL    Performed by: Alok    POCT Glucose    Collection Time: 02/07/23  9:03 PM   Result Value Ref Range    POC Glucose 182 (H) 65 - 100 mg/dL    Performed by: [facility]    Basic Metabolic Panel w/ Reflex to MG    Collection Time: 02/08/23  4:37 AM   Result Value Ref Range    Sodium 140 133 - 143 mmol/L    Potassium 3.7 3.5 - 5.1 mmol/L    Chloride 109 101 - 110 mmol/L    CO2 23 21 - 32 mmol/L    Anion Gap 8 2 - 11 mmol/L    Glucose 115 (H) 65 - 100 mg/dL    BUN 18 8 - 23 MG/DL    Creatinine 0.90 0.8 - 1.5 MG/DL    Est, Glom Filt Rate >60 >60 ml/min/1.73m2    Calcium 8.3 8.3 - 10.4 MG/DL   CBC with Auto Differential    Collection Time: 02/08/23  4:37 AM   Result Value Ref Range    WBC 10.0 4.3 - 11.1 K/uL    RBC 2.58 (L) 4.23 - 5.6 M/uL    Hemoglobin 8.1 (L) 13.6 - 17.2 g/dL    Hematocrit 25.1 (L) 41.1 - 50.3 %    MCV 97.3 82 - 102 FL    MCH 31.4 26.1 - 32.9 PG    MCHC 32.3 31.4 - 35.0 g/dL    RDW 17.3 (H) 11.9 - 14.6 %    Platelets 482 (L) 938 - 450 K/uL    MPV 11.2 9.4 - 12.3 FL    nRBC 0.00 0.0 - 0.2 K/uL    Differential Type AUTOMATED      Seg Neutrophils 80 (H) 43 - 78 %    Lymphocytes 10 (L) 13 - 44 %    Monocytes 9 4.0 - 12.0 %    Eosinophils % 0 (L) 0.5 - 7.8 %    Basophils 0 0.0 - 2.0 %    Immature Granulocytes 1 0.0 - 5.0 %    Segs Absolute 8.0 1.7 - 8.2 K/UL    Absolute Lymph # 1.0 0.5 - 4.6 K/UL    Absolute Mono # 0.9 0.1 - 1.3 K/UL    Absolute Eos # 0.0 0.0 - 0.8 K/UL    Basophils Absolute 0.0 0.0 - 0.2 K/UL Absolute Immature Granulocyte 0.1 0.0 - 0.5 K/UL   Vancomycin Level, Random    Collection Time: 02/08/23  4:37 AM   Result Value Ref Range    Vancomycin Rm 16.5 UG/ML   POCT Glucose    Collection Time: 02/08/23  7:38 AM   Result Value Ref Range    POC Glucose 132 (H) 65 - 100 mg/dL    Performed by: Alok    POCT Glucose    Collection Time: 02/08/23 10:58 AM   Result Value Ref Range    POC Glucose 157 (H) 65 - 100 mg/dL    Performed by: Alok        I have personally reviewed imaging studies:  Other Studies:  XR CHEST PORTABLE   Final Result      1. Right lower pneumonia. Recommend chest radiographic follow-up in eight weeks    for resolution.                 Inna Dawkins M.D.    2/6/2023 11:37:00 PM          Current Meds:  Current Facility-Administered Medications   Medication Dose Route Frequency    vancomycin (VANCOCIN) 1500 mg in sodium chloride 0.9% 500 mL IVPB  1,500 mg IntraVENous Q18H    [Held by provider] valsartan (DIOVAN) tablet 80 mg  80 mg Oral Daily    rosuvastatin (CRESTOR) tablet 10 mg  10 mg Oral Nightly    insulin lispro (HUMALOG) injection vial 0-8 Units  0-8 Units SubCUTAneous TID WC    insulin lispro (HUMALOG) injection vial 0-4 Units  0-4 Units SubCUTAneous Nightly    latanoprost (XALATAN) 0.005 % ophthalmic solution 1 drop  1 drop Both Eyes Nightly    furosemide (LASIX) tablet 20 mg  20 mg Oral Daily    clopidogrel (PLAVIX) tablet 75 mg  75 mg Oral Daily    apixaban (ELIQUIS) tablet 5 mg  5 mg Oral Q12H    amiodarone (PACERONE) tablet 200 mg  200 mg Oral Daily    albuterol (PROVENTIL) nebulizer solution 2.5 mg  2.5 mg Nebulization Q6H PRN    sodium chloride flush 0.9 % injection 5-40 mL  5-40 mL IntraVENous 2 times per day    sodium chloride flush 0.9 % injection 5-40 mL  5-40 mL IntraVENous PRN    0.9 % sodium chloride infusion   IntraVENous PRN    potassium chloride (KLOR-CON M) extended release tablet 40 mEq  40 mEq Oral PRN    Or    potassium bicarb-citric acid (EFFER-K) effervescent tablet 40 mEq  40 mEq Oral PRN    Or    potassium chloride 10 mEq/100 mL IVPB (Peripheral Line)  10 mEq IntraVENous PRN    potassium chloride 10 mEq/100 mL IVPB (Peripheral Line)  10 mEq IntraVENous PRN    magnesium sulfate 2000 mg in 50 mL IVPB premix  2,000 mg IntraVENous PRN    ondansetron (ZOFRAN-ODT) disintegrating tablet 4 mg  4 mg Oral Q8H PRN    Or    ondansetron (ZOFRAN) injection 4 mg  4 mg IntraVENous Q6H PRN    polyethylene glycol (GLYCOLAX) packet 17 g  17 g Oral Daily PRN    bisacodyl (DULCOLAX) suppository 10 mg  10 mg Rectal Daily PRN    famotidine (PEPCID) tablet 10 mg  10 mg Oral Daily PRN    aluminum & magnesium hydroxide-simethicone (MAALOX) 200-200-20 MG/5ML suspension 30 mL  30 mL Oral Q6H PRN    acetaminophen (TYLENOL) tablet 650 mg  650 mg Oral Q6H PRN    Or    acetaminophen (TYLENOL) suppository 650 mg  650 mg Rectal Q6H PRN    cefepime (MAXIPIME) 2,000 mg in sodium chloride 0.9 % 50 mL IVPB (mini-bag)  2,000 mg IntraVENous Q8H    budesonide (PULMICORT) nebulizer suspension 500 mcg  0.5 mg Nebulization BID    levalbuterol (XOPENEX) nebulization 0.63 mg  0.63 mg Nebulization Q6H WA    guaiFENesin (MUCINEX) extended release tablet 600 mg  600 mg Oral BID    carvedilol (COREG) tablet 6.25 mg  6.25 mg Oral BID        Signed:  Yong Reece, APRN - CNP    Part of this note may have been written by using a voice dictation software. The note has been proof read but may still contain some grammatical/other typographical errors.

## 2023-02-09 VITALS
RESPIRATION RATE: 18 BRPM | DIASTOLIC BLOOD PRESSURE: 52 MMHG | OXYGEN SATURATION: 91 % | HEIGHT: 73 IN | BODY MASS INDEX: 32.02 KG/M2 | WEIGHT: 241.62 LBS | TEMPERATURE: 98.6 F | HEART RATE: 60 BPM | SYSTOLIC BLOOD PRESSURE: 102 MMHG

## 2023-02-09 LAB
ANION GAP SERPL CALC-SCNC: 8 MMOL/L (ref 2–11)
BASOPHILS # BLD: 0 K/UL (ref 0–0.2)
BASOPHILS NFR BLD: 1 % (ref 0–2)
BUN SERPL-MCNC: 16 MG/DL (ref 8–23)
CALCIUM SERPL-MCNC: 8.7 MG/DL (ref 8.3–10.4)
CHLORIDE SERPL-SCNC: 109 MMOL/L (ref 101–110)
CO2 SERPL-SCNC: 24 MMOL/L (ref 21–32)
CREAT SERPL-MCNC: 0.8 MG/DL (ref 0.8–1.5)
DIFFERENTIAL METHOD BLD: ABNORMAL
EOSINOPHIL # BLD: 0 K/UL (ref 0–0.8)
EOSINOPHIL NFR BLD: 1 % (ref 0.5–7.8)
ERYTHROCYTE [DISTWIDTH] IN BLOOD BY AUTOMATED COUNT: 16.9 % (ref 11.9–14.6)
GLUCOSE BLD STRIP.AUTO-MCNC: 137 MG/DL (ref 65–100)
GLUCOSE BLD STRIP.AUTO-MCNC: 221 MG/DL (ref 65–100)
GLUCOSE SERPL-MCNC: 121 MG/DL (ref 65–100)
HCT VFR BLD AUTO: 24.5 % (ref 41.1–50.3)
HGB BLD-MCNC: 8 G/DL (ref 13.6–17.2)
IMM GRANULOCYTES # BLD AUTO: 0.1 K/UL (ref 0–0.5)
IMM GRANULOCYTES NFR BLD AUTO: 1 % (ref 0–5)
LYMPHOCYTES # BLD: 0.8 K/UL (ref 0.5–4.6)
LYMPHOCYTES NFR BLD: 12 % (ref 13–44)
MAGNESIUM SERPL-MCNC: 2 MG/DL (ref 1.8–2.4)
MCH RBC QN AUTO: 31.3 PG (ref 26.1–32.9)
MCHC RBC AUTO-ENTMCNC: 32.7 G/DL (ref 31.4–35)
MCV RBC AUTO: 95.7 FL (ref 82–102)
MONOCYTES # BLD: 0.4 K/UL (ref 0.1–1.3)
MONOCYTES NFR BLD: 6 % (ref 4–12)
NEUTS SEG # BLD: 5.2 K/UL (ref 1.7–8.2)
NEUTS SEG NFR BLD: 79 % (ref 43–78)
NRBC # BLD: 0 K/UL (ref 0–0.2)
PLATELET # BLD AUTO: 172 K/UL (ref 150–450)
PMV BLD AUTO: 10.9 FL (ref 9.4–12.3)
POTASSIUM SERPL-SCNC: 3.5 MMOL/L (ref 3.5–5.1)
RBC # BLD AUTO: 2.56 M/UL (ref 4.23–5.6)
SERVICE CMNT-IMP: ABNORMAL
SERVICE CMNT-IMP: ABNORMAL
SODIUM SERPL-SCNC: 141 MMOL/L (ref 133–143)
WBC # BLD AUTO: 6.5 K/UL (ref 4.3–11.1)

## 2023-02-09 PROCEDURE — 83735 ASSAY OF MAGNESIUM: CPT

## 2023-02-09 PROCEDURE — 82962 GLUCOSE BLOOD TEST: CPT

## 2023-02-09 PROCEDURE — 6370000000 HC RX 637 (ALT 250 FOR IP): Performed by: INTERNAL MEDICINE

## 2023-02-09 PROCEDURE — 6360000002 HC RX W HCPCS: Performed by: HOSPITALIST

## 2023-02-09 PROCEDURE — 94640 AIRWAY INHALATION TREATMENT: CPT

## 2023-02-09 PROCEDURE — 80048 BASIC METABOLIC PNL TOTAL CA: CPT

## 2023-02-09 PROCEDURE — 2580000003 HC RX 258: Performed by: HOSPITALIST

## 2023-02-09 PROCEDURE — 36415 COLL VENOUS BLD VENIPUNCTURE: CPT

## 2023-02-09 PROCEDURE — 2580000003 HC RX 258: Performed by: INTERNAL MEDICINE

## 2023-02-09 PROCEDURE — 6370000000 HC RX 637 (ALT 250 FOR IP): Performed by: NURSE PRACTITIONER

## 2023-02-09 PROCEDURE — 94761 N-INVAS EAR/PLS OXIMETRY MLT: CPT

## 2023-02-09 PROCEDURE — 6370000000 HC RX 637 (ALT 250 FOR IP): Performed by: HOSPITALIST

## 2023-02-09 PROCEDURE — 85025 COMPLETE CBC W/AUTO DIFF WBC: CPT

## 2023-02-09 RX ORDER — FERROUS SULFATE 325(65) MG
325 TABLET ORAL
Qty: 30 TABLET | Refills: 3 | Status: SHIPPED | OUTPATIENT
Start: 2023-02-10

## 2023-02-09 RX ORDER — CARVEDILOL 6.25 MG/1
6.25 TABLET ORAL 2 TIMES DAILY WITH MEALS
Qty: 60 TABLET | Refills: 1 | Status: SHIPPED | OUTPATIENT
Start: 2023-02-09

## 2023-02-09 RX ORDER — CEFDINIR 300 MG/1
300 CAPSULE ORAL 2 TIMES DAILY
Qty: 8 CAPSULE | Refills: 0 | Status: SHIPPED | OUTPATIENT
Start: 2023-02-09 | End: 2023-02-13

## 2023-02-09 RX ADMIN — LEVALBUTEROL HYDROCHLORIDE 0.63 MG: 0.63 SOLUTION RESPIRATORY (INHALATION) at 07:35

## 2023-02-09 RX ADMIN — GUAIFENESIN 600 MG: 600 TABLET ORAL at 08:41

## 2023-02-09 RX ADMIN — SODIUM CHLORIDE, PRESERVATIVE FREE 10 ML: 5 INJECTION INTRAVENOUS at 08:42

## 2023-02-09 RX ADMIN — FERROUS SULFATE TAB 325 MG (65 MG ELEMENTAL FE) 325 MG: 325 (65 FE) TAB at 08:41

## 2023-02-09 RX ADMIN — APIXABAN 5 MG: 5 TABLET, FILM COATED ORAL at 08:41

## 2023-02-09 RX ADMIN — BUDESONIDE 500 MCG: 0.5 INHALANT RESPIRATORY (INHALATION) at 07:35

## 2023-02-09 RX ADMIN — CEFEPIME 2000 MG: 2 INJECTION, POWDER, FOR SOLUTION INTRAVENOUS at 08:41

## 2023-02-09 RX ADMIN — INSULIN LISPRO 2 UNITS: 100 INJECTION, SOLUTION INTRAVENOUS; SUBCUTANEOUS at 11:37

## 2023-02-09 RX ADMIN — CLOPIDOGREL BISULFATE 75 MG: 75 TABLET ORAL at 08:41

## 2023-02-09 RX ADMIN — FUROSEMIDE 20 MG: 20 TABLET ORAL at 08:41

## 2023-02-09 RX ADMIN — CARVEDILOL 6.25 MG: 3.12 TABLET, FILM COATED ORAL at 08:41

## 2023-02-09 RX ADMIN — AMIODARONE HYDROCHLORIDE 200 MG: 100 TABLET ORAL at 08:41

## 2023-02-09 ASSESSMENT — PAIN SCALES - GENERAL: PAINLEVEL_OUTOF10: 0

## 2023-02-09 NOTE — DISCHARGE SUMMARY
Hospitalist Discharge Summary   Admit Date:  2023 10:09 PM   DC Note date: 2023  Name:  Rigo Nichols   Age:  65 y.o. Sex:  male  :  1945   MRN:  [id number]   Room:  [facility]  PCP:  Marilu Wayne MD    Presenting Complaint: Fever     Initial Admission Diagnosis: Acute respiratory failure with hypoxia ([address] [J96.01]  Altered mental status, unspecified altered mental status type [R41.82]  Pneumonia of right lower lobe due to infectious organism [J18.9]     Problem List for this Hospitalization (present on admission):    Principal Problem:    Acute respiratory failure with hypoxia ([address]  Active Problems:    ICD (implantable cardioverter-defibrillator) in place    Anemia    Hospital-acquired bacterial pneumonia    Atrial fibrillation ([address]    HTN (hypertension)    COPD (chronic obstructive pulmonary disease) ([address]    Diabetic neuropathy ([address]    Coronary artery disease involving native coronary artery of native heart without angina pectoris    PAD (peripheral artery disease) ([address]    S/P angioplasty with stent    DM (diabetes mellitus) type II, controlled, with peripheral vascular disorder ([address]  Resolved Problems:    * No resolved hospital problems. [facility] Course: Rigo Nichols is a 65 y.o. male w/ a PMH of ischemic CM, chronic sHF, SHERI, PAD, COPD, HTN, DM2, HLD and atrial fib admitted on  with acute respiratory failure secondary to R lower lobe hospital-acquired pneumonia. Fever resolved  and patient was doing well but then became lethargic and sleeping more. Upon EMS evaluation, patient was hypoxic to 81% on room air requiring NRB en route to the hospital.      In the ER patient was febrile to 102.2 °F, hypoxic on room air with saturation of 81%. Laboratory work-up notable for proBNP of 616, hemoglobin 10.3. CXR with R lower lobe pneumonia. Procalcitonin 0.22. He was admitted to the Hospitalist service and improved on antibiotics. MRSA/SA swab negative.   He will be discharged on cefdinir. He has been weaned to room air and does not need/qualify for home O2 based on ambulation test with RT. Due to lower blood pressures his home BB was reduced and ARB discontinued. The patient needs to review his home medications during PCP follow up and monitor his BP at home. PT/OT recommend Home health. Mr. Ирина Moscoso said he feels back to baseline and would like to return home. Rx provided and he is appropriate to return home on Feb/09/2023. A&P  Principal Problem:  Acute respiratory failure with hypoxia (HCC)  Right lower lobe hospital-acquired pneumonia  -Weaned to room air; does not qualify/need O2 at discharge per 6-minute walk test  -Cefepime --> cefdinir at discharge; avoiding fluoroquinolone with amiodarone; reviewed with PharmD  -MRSA swab negative, vancomycin discontinued  -PT/OT recommend Home health  -Incentive spirometry at discharge     Active Problems:  ICD (implantable cardioverter-defibrillator) in place  Noted. Last interrogated 10/2022     Anemia, normocytic  -No bleeding    -Recent anemia labs showing RAVINDRA  -Fe supplement        Atrial fibrillation ([facility])  -Continue Coreg/Eliquis      HTN (hypertension)  -Due to hypotension, home Coreg adjusted, ARB discontinued  - Follow up with your PCP for a medication review     COPD (chronic obstructive pulmonary disease) ([facility]  -Not in exacerbation. No wheezing noted on exam      Diabetic neuropathy ([facility]   -Noted     Coronary artery disease involving native coronary artery of native heart without angina pectoris  -Statin     PAD (peripheral artery disease) ([facility])  -Eliquis/Statin     DM (diabetes mellitus) type II, controlled, with peripheral vascular disorder ([address]  Accu checks with SSI      Disposition: Home  Diet: ADULT DIET;  Regular; 4 carb choices (60 gm/meal)  ADULT ORAL NUTRITION SUPPLEMENT; Breakfast; Diabetic Oral Supplement  Code Status: Full Code    Follow Ups:   Rafael Mathis MD Follow up in 1 week(s). Specialty: Internal Medicine  Why: post discharge check up and medication review  Contact information:  [address] [place] [address]  [phone]                       Time spent in patient discharge and coordination 39 minutes. Plan was discussed with the patient, PharmD, and Attending. All questions answered. Patient was stable at time of discharge. Instructions given to call a physician or return if any concerns. Current Discharge Medication List        START taking these medications    Details   ferrous sulfate (IRON 325) 325 (65 Fe) MG tablet Take 1 tablet by mouth daily (with breakfast)  Qty: 30 tablet, Refills: 3      cefdinir (OMNICEF) 300 MG capsule Take 1 capsule by mouth 2 times daily for 4 days  Qty: 8 capsule, Refills: 0           CONTINUE these medications which have CHANGED    Details   carvedilol (COREG) 6.25 MG tablet Take 1 tablet by mouth 2 times daily (with meals)  Qty: 60 tablet, Refills: 1           CONTINUE these medications which have NOT CHANGED    Details   montelukast (SINGULAIR) 10 MG tablet TAKE 1 TABLET BY MOUTH EVERY DAY AT BEDTIME  Qty: 90 tablet, Refills: 3      magnesium oxide (MAG-OX) 400 (240 Mg) MG tablet TAKE 1 TABLET BY MOUTH EVERY DAY  Qty: 90 tablet, Refills: 3      albuterol (PROVENTIL) (2.5 MG/3ML) 0.083% nebulizer solution 1 vial via nebulizer 4 times daily if needed for shortness of breath or wheezing. Diagnosis-COPD, J44.9. Bill to Medicare part B  Qty: 360 mL, Refills: 11      senna-docusate (PERICOLACE) 8.6-50 MG per tablet Take 1 tablet by mouth nightly      CPAP Machine MISC by Does not apply route      albuterol sulfate HFA (PROVENTIL;VENTOLIN;PROAIR) 108 (90 Base) MCG/ACT inhaler USE 2 PUFFS BY INHALATION 4 TIMES DAILY AS NEEDED FOR WHEEZING OR SHORTNESS OF BREATH. Patient prefers ventolin.   Qty: 18 g, Refills: 11      GUAIFENESIN 1200 PO Take 1 tablet by mouth every 12 hours as needed      acetaminophen (TYLENOL) 500 MG tablet Take 500 mg by mouth every 6 hours as needed for Pain Taking 1/2 two times daily      glipiZIDE (GLUCOTROL XL) 10 MG extended release tablet Take 1 tablet by mouth daily  Qty: 30 tablet, Refills: 0      furosemide (LASIX) 20 MG tablet Take 1 tablet by mouth in the morning. Qty: 60 tablet, Refills: 3      rosuvastatin (CRESTOR) 10 MG tablet TAKE 1 TABLET BY MOUTH EVERY DAY  Qty: 90 tablet, Refills: 0    Comments: * * N O T I C E * * Last quantity doesn't match original quantity      fluticasone-salmeterol (ADVAIR) 250-50 MCG/ACT AEPB diskus inhaler Inhale 1 puff into the lungs in the morning and at bedtime      amiodarone (CORDARONE) 200 MG tablet Take 200 mg by mouth daily      apixaban (ELIQUIS) 5 MG TABS tablet Take 5 mg by mouth every 12 hours      clopidogrel (PLAVIX) 75 MG tablet Take 75 mg by mouth daily      fluticasone (FLONASE) 50 MCG/ACT nasal spray USE 1 OR 2 SPRAYS IN EACH NOSTRIL EVERY DAY.       latanoprost (XALATAN) 0.005 % ophthalmic solution Apply 1 drop to eye nightly      nitroGLYCERIN (NITROSTAT) 0.4 MG SL tablet Place 0.4 mg under the tongue           STOP taking these medications       dorzolamide-timolol (COSOPT) 22.3-6.8 MG/ML ophthalmic solution Comments:   Reason for Stopping:         valsartan (DIOVAN) 80 MG tablet Comments:   Reason for Stopping:         tamsulosin (FLOMAX) 0.4 MG capsule Comments:   Reason for Stopping:         traMADol (ULTRAM) 50 MG tablet Comments:   Reason for Stopping:         amLODIPine (NORVASC) 5 MG tablet Comments:   Reason for Stopping:         QUEtiapine (SEROQUEL) 25 MG tablet Comments:   Reason for Stopping:         ascorbic acid (VITAMIN C) 500 MG tablet Comments:   Reason for Stopping:         Cholecalciferol 50 MCG (2000 UT) TABS Comments:   Reason for Stopping:         metFORMIN (GLUCOPHAGE) 500 MG tablet Comments:   Reason for Stopping:               Some medications may have been reported old/obsolete and marked \"stop taking\" by the system; in reality pt was already off these meds; defer to outpatient or prescribing providers. Procedures done this admission:  * No surgery found *    Consults this admission:  IP WOUND CARE NURSE CONSULT TO EVAL    Echocardiogram results:  12/29/22    TRANSESOPHAGEAL ECHOCARDIOGRAM (CONTRAST/3D PRN) 01/05/2023  4:33 PM, 01/05/2023 12:00 AM (Final)    Interpretation Summary    Left Ventricle: Normal left ventricular systolic function with a visually estimated EF of 55 - 60%. Left ventricle size is normal. Mildly increased wall thickness. Findings consistent with mild concentric hypertrophy. Normal wall motion. Normal diastolic function for age. Mitral Valve: Mild regurgitation with a centrally directed jet. Left Atrium: Left atrium is moderately dilated.    -Atrial paced rhythm noted during the study. No valvular vegetations noted. Signed by: Chele Herndon MD on 1/5/2023  4:33 PM, Signed by: Unknown Provider Result on 1/5/2023 12:00 AM      Diagnostic Imaging/Tests:   XR CHEST PORTABLE    Result Date: 2/6/2023  1. Right lower pneumonia. Recommend chest radiographic follow-up in eight weeks  for resolution.   Batsheva Montes M.D. 2/6/2023 11:37:00 PM       Labs: Results:       BMP, Mg, Phos Recent Labs     02/06/23 2230 02/08/23 0437 02/09/23  0444    140 141   K 4.3 3.7 3.5    109 109   CO2 24 23 24   ANIONGAP 6 8 8   BUN 24* 18 16   CREATININE 1.20 0.90 0.80   LABGLOM >60 >60 >60   CALCIUM 8.5 8.3 8.7   GLUCOSE 193* 115* 121*   MG  --   --  2.0      CBC Recent Labs     02/06/23 2230 02/08/23 0437 02/09/23  0444   WBC 9.7 10.0 6.5   RBC 3.23* 2.58* 2.56*   HGB 10.3* 8.1* 8.0*   HCT 30.4* 25.1* 24.5*   MCV 94.1 97.3 95.7   MCH 31.9 31.4 31.3   MCHC 33.9 32.3 32.7   RDW 17.2* 17.3* 16.9*    146* 172   MPV 10.9 11.2 10.9   NRBC 0.00 0.00 0.00   SEGS 87* 80* 79*   LYMPHOPCT 5* 10* 12*   EOSRELPCT 0* 0* 1   MONOPCT 7 9 6   BASOPCT 0 0 1   IMMGRAN 1 1 1   SEGSABS 8.4* 8.0 5.2 LYMPHSABS 0.5 1.0 0.8   EOSABS 0.0 0.0 0.0   MONOSABS 0.6 0.9 0.4   BASOSABS 0.0 0.0 0.0   ABSIMMGRAN 0.1 0.1 0.1      LFT Recent Labs     02/06/23  2230   BILITOT 1.2*   ALKPHOS 78   AST 10*   ALT 23   PROT 6.6   LABALBU 3.1*   GLOB 3.5      Cardiac  Lab Results   Component Value Date/Time    NTPROBNP 616 02/06/2023 10:30 PM    NTPROBNP 10,883 12/29/2022 10:18 AM    TROPHS 20.7 02/06/2023 10:30 PM    TROPHS 44.8 12/29/2022 12:59 PM    TROPHS 51.7 12/29/2022 10:18 AM      Coags Lab Results   Component Value Date/Time    PROTIME 16.3 07/30/2022 09:53 AM    PROTIME 15.6 03/04/2022 03:57 PM    PROTIME 14.7 12/31/2020 08:23 PM    INR 1.3 07/30/2022 09:53 AM    INR 1.2 03/04/2022 03:57 PM    INR 1.1 12/31/2020 08:23 PM    APTT 63.6 08/02/2022 05:41 AM    APTT 33.8 08/01/2022 09:21 PM    APTT 72.5 08/01/2022 06:46 AM    APTT 24.3 03/04/2022 03:57 PM      A1c Lab Results   Component Value Date/Time    LABA1C 5.6 12/29/2022 10:18 AM    LABA1C 6.0 08/10/2022 08:52 AM    LABA1C 5.3 07/27/2022 11:46 PM     12/29/2022 10:18 AM     08/10/2022 08:52 AM     07/27/2022 11:46 PM      Lipids Lab Results   Component Value Date/Time    CHOL 77 03/05/2022 06:14 AM    LDLCALC 16.4 03/05/2022 06:14 AM    LABVLDL 15.6 03/05/2022 06:14 AM    HDL 45 03/05/2022 06:14 AM    CHOLHDLRATIO 1.7 03/05/2022 06:14 AM    TRIG 78 03/05/2022 06:14 AM      Thyroid  Lab Results   Component Value Date/Time    TSHELE 0.34 12/29/2022 12:59 PM    NYR5QNV 1.010 08/09/2022 07:17 AM        Most Recent UA Lab Results   Component Value Date/Time    COLORU YELLOW/STRAW 08/24/2022 05:30 PM    APPEARANCE CLEAR 08/24/2022 05:30 PM    SPECGRAV 1.010 08/24/2022 05:30 PM    LABPH 6.0 08/24/2022 05:30 PM    PROTEINU Negative 08/24/2022 05:30 PM    GLUCOSEU Negative 12/29/2022 12:26 PM    GLUCOSEU Negative 08/24/2022 05:30 PM    KETUA Negative 08/24/2022 05:30 PM    BILIRUBINUR Negative 08/24/2022 05:30 PM    BILIRUBINUR Negative 03/07/2022 08:59 PM BLOODU Negative 12/29/2022 12:26 PM    BLOODU Negative 08/24/2022 05:30 PM    UROBILINOGEN 0.2 08/24/2022 05:30 PM    NITRU Negative 08/24/2022 05:30 PM    LEUKOCYTESUR SMALL 08/24/2022 05:30 PM    WBCUA 5-10 08/24/2022 05:30 PM    RBCUA 0-5 08/24/2022 05:30 PM    EPITHUA 0-5 08/24/2022 05:30 PM    BACTERIA Negative 08/24/2022 05:30 PM    LABCAST 0-2 08/24/2022 05:30 PM        Recent Labs     02/08/23  1402 02/07/23  0934 02/07/23  0044 02/06/23  2230   CULTURE No growth after short period of incubation. Further results to follow after overnight incubation. SA target not detected. A MRSA NEGATIVE, SA NEGATIVE test result does not preclude MRSA or SA nasal colonization. PENDING NO GROWTH AFTER 9 HOURS       All Labs from Last 24 Hrs:  Recent Results (from the past 24 hour(s))   Culture, Nasal    Collection Time: 02/08/23  2:02 PM    Specimen: Nose   Result Value Ref Range    Special Requests NO SPECIAL REQUESTS      Culture        No growth after short period of incubation. Further results to follow after overnight incubation.    POCT Glucose    Collection Time: 02/08/23  4:39 PM   Result Value Ref Range    POC Glucose 152 (H) 65 - 100 mg/dL    Performed by: Alok    POCT Glucose    Collection Time: 02/08/23  8:57 PM   Result Value Ref Range    POC Glucose 169 (H) 65 - 100 mg/dL    Performed by: RadhaGURINDER    Basic Metabolic Panel w/ Reflex to MG    Collection Time: 02/09/23  4:44 AM   Result Value Ref Range    Sodium 141 133 - 143 mmol/L    Potassium 3.5 3.5 - 5.1 mmol/L    Chloride 109 101 - 110 mmol/L    CO2 24 21 - 32 mmol/L    Anion Gap 8 2 - 11 mmol/L    Glucose 121 (H) 65 - 100 mg/dL    BUN 16 8 - 23 MG/DL    Creatinine 0.80 0.8 - 1.5 MG/DL    Est, Glom Filt Rate >60 >60 ml/min/1.73m2    Calcium 8.7 8.3 - 10.4 MG/DL   CBC with Auto Differential    Collection Time: 02/09/23  4:44 AM   Result Value Ref Range    WBC 6.5 4.3 - 11.1 K/uL    RBC 2.56 (L) 4.23 - 5.6 M/uL    Hemoglobin 8.0 (L) 13.6 - 17.2 g/dL    Hematocrit 24.5 (L) 41.1 - 50.3 %    MCV 95.7 82 - 102 FL    MCH 31.3 26.1 - 32.9 PG    MCHC 32.7 31.4 - 35.0 g/dL    RDW 16.9 (H) 11.9 - 14.6 %    Platelets 470 680 - 622 K/uL    MPV 10.9 9.4 - 12.3 FL    nRBC 0.00 0.0 - 0.2 K/uL    Differential Type AUTOMATED      Seg Neutrophils 79 (H) 43 - 78 %    Lymphocytes 12 (L) 13 - 44 %    Monocytes 6 4.0 - 12.0 %    Eosinophils % 1 0.5 - 7.8 %    Basophils 1 0.0 - 2.0 %    Immature Granulocytes 1 0.0 - 5.0 %    Segs Absolute 5.2 1.7 - 8.2 K/UL    Absolute Lymph # 0.8 0.5 - 4.6 K/UL    Absolute Mono # 0.4 0.1 - 1.3 K/UL    Absolute Eos # 0.0 0.0 - 0.8 K/UL    Basophils Absolute 0.0 0.0 - 0.2 K/UL    Absolute Immature Granulocyte 0.1 0.0 - 0.5 K/UL   Magnesium    Collection Time: 02/09/23  4:44 AM   Result Value Ref Range    Magnesium 2.0 1.8 - 2.4 mg/dL   POCT Glucose    Collection Time: 02/09/23  7:22 AM   Result Value Ref Range    POC Glucose 137 (H) 65 - 100 mg/dL    Performed by: CammieaPCT    POCT Glucose    Collection Time: 02/09/23 11:08 AM   Result Value Ref Range    POC Glucose 221 (H) 65 - 100 mg/dL    Performed by: DianethaPCT        Allergies   Allergen Reactions    Acetaminophen Hives     Per spouse has small amount of hives, takes 1/2 and tolerates     Azithromycin Hives    Morphine Hallucinations     Muscle twitching. jerking    Oxaprozin Other (See Comments)     ELEVATED BLOOD PRESSURE    Oxycodone Anxiety     Immunization History   Administered Date(s) Administered    COVID-19, MODERNA BLUE border, Primary or Immunocompromised, (age 12y+), IM, 100 mcg/0.5mL 03/15/2021, 04/15/2021    Influenza Trivalent 09/26/2014, 10/12/2015    Influenza Virus Vaccine 09/30/2010, 10/17/2011, 09/01/2013, 11/02/2014, 11/02/2021    Influenza, FLUAD, (age 72 y+), Adjuvanted, 0.5mL 11/02/2021    Influenza, High Dose (Fluzone 65 yrs and older) 11/17/2016, 08/11/2017, 09/13/2018, 10/07/2019, 09/30/2020    PPD Test 01/06/2021, 07/27/2022, 08/16/2022, 08/25/2022, 01/02/2023    Pneumococcal Conjugate 13-valent ([id number]) 04/10/2015    Pneumococcal Polysaccharide (Heanpfecn60) 11/27/2007, 10/17/2011    Pneumococcal Vaccine 10/17/2011    Zoster Recombinant (Shingrix) 09/06/2018, 11/27/2018       Recent Vital Data:  Patient Vitals for the past 24 hrs:   Temp Pulse Resp BP SpO2   02/09/23 1108 98.6 °F (37 °C) 60 18 (!) 102/52 91 %   02/09/23 0735 -- 61 16 -- 95 %   02/09/23 0721 98.2 °F (36.8 °C) 60 18 127/67 97 %   02/09/23 0346 -- 63 -- -- 96 %   02/09/23 0345 98.4 °F (36.9 °C) 60 20 124/62 --   02/08/23 2252 98.8 °F (37.1 °C) 60 17 131/67 96 %   02/08/23 2004 -- 66 16 -- 92 %   02/08/23 1946 98.6 °F (37 °C) 67 17 129/66 90 %   02/08/23 1513 98.2 °F (36.8 °C) 66 16 120/70 95 %   02/08/23 1439 -- 63 16 -- 93 %       Oxygen Therapy  SpO2: 91 %  Pulse via Oximetry: 73 beats per minute  Pulse Oximeter Device Mode: Intermittent  O2 Device: None (Room air)  O2 Flow Rate (L/min): 2 L/min  Blood Gas  Performed?: Yes  Bill's Test #1: Collateral flow confirmed  Site #1: Arterial line  Site Prepped #1: Yes  Number of Attempts #1: 1  Pressure Held #1: Yes  Complications #1: None  Post-procedure #1: Standard  Specimen Status #1: Point of care  How Tolerated?: Tolerated well    Estimated body mass index is 31.88 kg/m² as calculated from the following:    Height as of this encounter: 6' 1\" (1.854 m). Weight as of this encounter: 241 lb 10 oz (109.6 kg). Intake/Output Summary (Last 24 hours) at 2/9/2023 1205  Last data filed at 2/9/2023 0849  Gross per 24 hour   Intake 118 ml   Output 1400 ml   Net -1282 ml         Physical Exam:  General:    No overt distress  Head:  Normocephalic, atraumatic  Eyes:  Sclerae appear normal.  Pupils equally round. HENT:  Nares appear normal, no drainage. Moist mucous membranes  Neck:  No restricted ROM. Trachea midline  CV:   RRR. No m/r/g. Lungs: No wheezing.   Respirations even, unlabored on room air. Abdomen:   Soft, nontender, nondistended. Extremities: Warm and dry. No cyanosis or clubbing. Skin:     No rashes. Normal coloration  Neuro:  CN II-XII grossly intact. Psych:  Normal mood and affect. Signed:  AARON Colby CNP    Part of this note may have been written by using a voice dictation software. The note has been proof read but may still contain some grammatical/other typographical errors.

## 2023-02-09 NOTE — PROGRESS NOTES
Discharge instructions reviewed with patient and wife. Meds reviewed. Pt voiced understanding. IV removed. IS given to pt and educated on how to use. Paperwork signed and copy placed in chart. Pt to be discharged when dressed and ready.

## 2023-02-09 NOTE — PLAN OF CARE
Problem: Discharge Planning  Goal: Discharge to home or other facility with appropriate resources  Outcome: Adequate for Discharge     Problem: Skin/Tissue Integrity  Goal: Absence of new skin breakdown  Description: 1. Monitor for areas of redness and/or skin breakdown  2. Assess vascular access sites hourly  3. Every 4-6 hours minimum:  Change oxygen saturation probe site  4. Every 4-6 hours:  If on nasal continuous positive airway pressure, respiratory therapy assess nares and determine need for appliance change or resting period.   Outcome: Adequate for Discharge     Problem: Safety - Adult  Goal: Free from fall injury  Outcome: Adequate for Discharge     Problem: Chronic Conditions and Co-morbidities  Goal: Patient's chronic conditions and co-morbidity symptoms are monitored and maintained or improved  Outcome: Adequate for Discharge     Problem: Metabolic/Fluid and Electrolytes - Adult  Goal: Hemodynamic stability and optimal renal function maintained  Outcome: Adequate for Discharge  Goal: Glucose maintained within prescribed range  Outcome: Adequate for Discharge     Problem: Respiratory - Adult  Goal: Achieves optimal ventilation and oxygenation  2/9/2023 1242 by Domenic Colin RN  Outcome: Adequate for Discharge  2/9/2023 0835 by Dilan Bassett RCP  Outcome: Progressing  Flowsheets (Taken 2/9/2023 0835)  Achieves optimal ventilation and oxygenation:   Assess for changes in respiratory status   Assess for changes in mentation and behavior   Position to facilitate oxygenation and minimize respiratory effort   Respiratory therapy support as indicated   Assess and instruct to report shortness of breath or any respiratory difficulty   Encourage broncho-pulmonary hygiene including cough, deep breathe, incentive spirometry

## 2023-02-09 NOTE — PROGRESS NOTES
Pt resting in bed awake. Alert and oriented times 3 at this time. Cpap in place still from night time use. No s/sx of distress noted. Pt denies any pain. Encouraged to call for assistance as needed. Call light within reach. Will continue to monitor.

## 2023-02-09 NOTE — DISCHARGE INSTRUCTIONS
DISCHARGE SUMMARY from Nurse    PATIENT INSTRUCTIONS:    After general anesthesia or intravenous sedation, for 24 hours or while taking prescription Narcotics:  Limit your activities  Do not drive and operate hazardous machinery  Do not make important personal or business decisions  Do  not drink alcoholic beverages  If you have not urinated within 8 hours after discharge, please contact your surgeon on call. Report the following to your surgeon:  Excessive pain, swelling, redness or odor of or around the surgical area  Temperature over 100.5  Nausea and vomiting lasting longer than 4 hours or if unable to take medications  Any signs of decreased circulation or nerve impairment to extremity: change in color, persistent  numbness, tingling, coldness or increase pain  Any questions    What to do at Home:  Recommended activity: activity as tolerated,     If you experience any of the following symptoms shortness of breath not relieved by rest, pain not relieved with medication, chest pain or pressure, increase in weakness or swelling, temperature greater than 101, nausea, vomiting or diarrhea, please follow up with MD.    *  Please give a list of your current medications to your Primary Care Provider. *  Please update this list whenever your medications are discontinued, doses are      changed, or new medications (including over-the-counter products) are added. *  Please carry medication information at all times in case of emergency situations. These are general instructions for a healthy lifestyle:    No smoking/ No tobacco products/ Avoid exposure to second hand smoke  Surgeon General's Warning:  Quitting smoking now greatly reduces serious risk to your health.     Obesity, smoking, and sedentary lifestyle greatly increases your risk for illness    A healthy diet, regular physical exercise & weight monitoring are important for maintaining a healthy lifestyle    You may be retaining fluid if you have a history of heart failure or if you experience any of the following symptoms:  Weight gain of 3 pounds or more overnight or 5 pounds in a week, increased swelling in our hands or feet or shortness of breath while lying flat in bed. Please call your doctor as soon as you notice any of these symptoms; do not wait until your next office visit. The discharge information has been reviewed with the patient and spouse. The patient and spouse verbalized understanding. Discharge medications reviewed with the patient and spouse and appropriate educational materials and side effects teaching were provided.   ___________________________________________________________________________________________________________________________________

## 2023-02-09 NOTE — CARE COORDINATION
MSN, CM:  patient to be discharged home with [facility] and [address] [place] [address]. Patient and family agree with this discharge plan. Patient has met all milestones for this admission. Family to transport patient home. 02/09/23 1223   Service Assessment   Patient Orientation Alert and [address] Discharge   Transition of Care Consult (CM Consult) [address]  ([address])   [facility] No   Reason Outside Agency Chosen Patient already serviced by other home care/hospice agency   Mode of Transport at Discharge Other (see comment)  (family to transport)   Confirm Follow Up Transport Family   Condition of Participation: Discharge Planning   The Plan for Transition of Care is related to the following treatment goals: Home Health to regain strenght back to baseline   The Patient and/or Patient Representative was provided with a Choice of Provider? Patient;Patient Representative   Name of the Patient Representative who was provided with the Choice of Provider and agrees with the Discharge Plan? Wife   The Patient and/Or Patient Representative agree with the Discharge Plan? Yes   Freedom of Choice list was provided with basic dialogue that supports the patient's individualized plan of care/goals, treatment preferences, and shares the quality data associated with the providers?   Yes

## 2023-02-09 NOTE — PROGRESS NOTES
02/09/23 1159   Resting (Room Air)   SpO2 91   HR 80   During Walk (Room Air)   SpO2 89   HR 91   Walk/Assistance Device Walker   Rate of Dyspnea 0   Symptoms Fatigue   After Walk   SpO2 91   HR 87   Rate of Dyspnea 0   Symptoms Fatigue   Does the Patient Qualify for Home O2 No   Does the Patient Need Portable Oxygen Tanks No

## 2023-02-09 NOTE — PLAN OF CARE
Problem: Respiratory - Adult  Goal: Achieves optimal ventilation and oxygenation  Outcome: Progressing  Flowsheets (Taken 2/9/2023 8646)  Achieves optimal ventilation and oxygenation:   Assess for changes in respiratory status   Assess for changes in mentation and behavior   Position to facilitate oxygenation and minimize respiratory effort   Respiratory therapy support as indicated   Assess and instruct to report shortness of breath or any respiratory difficulty   Encourage broncho-pulmonary hygiene including cough, deep breathe, incentive spirometry

## 2023-02-10 ENCOUNTER — CARE COORDINATION (OUTPATIENT)
Dept: CARE COORDINATION | Facility: CLINIC | Age: 78
End: 2023-02-10

## 2023-02-10 NOTE — CARE COORDINATION
[facility] Care Transitions Initial Follow Up Call    Call within 2 business days of discharge: Yes    Patient Current Location:  Home: [address] Coordinator contacted the family by telephone to perform post hospital discharge assessment. Verified name and  with family as identifiers. Provided introduction to self, and explanation of the LPN Care Coordinator role. Patient: Sylvia Dunaway Patient : 1945   MRN: [id number]  Reason for Admission: Acute Respiratory Failure with hypoxia  Discharge Date: 23 RARS: Readmission Risk Score: 31      Last Discharge  Street       Date Complaint Diagnosis Description Type Department Provider    23 Fever Acute respiratory failure with hypoxia ([facility] [address] . .. ED to Hosp-Admission (Discharged) (ADMITTED) KAMRON Mendez MD; Sheila Reyes ... Was this an external facility discharge? No Discharge Facility: [address] [place] [address] to be reviewed by the provider   Additional needs identified to be addressed with provider: No  none               Method of communication with provider: none. Spoke with patient spouse states patient is doing fine. Spouse denies any increase shortness of breath. Spouse states patient is doing breathing exercise. Spouse reminded of Wound care appointment on 2023 and PCP appointment on 2/15/2023. LPN Care Coordinator reviewed discharge instructions with family who verbalized understanding. The family was given an opportunity to ask questions and does not have any further questions or concerns at this time. Were discharge instructions available to patient? Yes. Reviewed appropriate site of care based on symptoms and resources available to patient including: PCP  Specialist  When to call 911. The family agrees to contact the PCP office for questions related to their healthcare.      Advance Care Planning:   Does patient have an Advance Directive: decision maker updated. Medication reconciliation was performed with family, who verbalizes understanding of administration of home medications. Medications reviewed, 1111F entered: N/A    Was patient discharged with a pulse oximeter? no    Non-face-to-face services provided:  Obtained and reviewed discharge summary and/or continuity of care documents    Offered patient enrollment in the Remote Patient Monitoring (RPM) program for in-home monitoring: NA.    Care Transitions 24 Hour Call    Do you have a copy of your discharge instructions?: Yes  Do you have all of your prescriptions and are they filled?: Yes  Have you been contacted by a [address]?: No  Have you scheduled your follow up appointment?: Yes (Comment: wound care 2/13/2023 and PCP 2/15/2023)  How are you going to get to your appointment?: Car - family or friend to transport  [address] [place] [address] Road you have support at home?: Partner/Spouse/SO  Do you feel like you have everything you need to keep you well at home?: Yes  Are you an active caregiver in your home?: No  Care Transitions Interventions  No Identified Needs         Discussed follow-up appointments. If no appointment was previously scheduled, appointment scheduling offered: Yes. Is follow up appointment scheduled within 7 days of discharge? Yes. Follow Up  Future Appointments   Date Time Provider Mac Almaraz   2/13/2023  9:30 AM Astrid Borjas MD [address]   2/15/2023  9:40 AM AARON Wyman - CNP [facility]   2/27/2023  2:40 PM AARON Navarrete - CNP [facility]   3/6/2023  2:45 PM Aldo Rojas MD [facility]   7/26/2023  2:15 PM Alo Smith MD [facility]        Goals Addressed                   This Visit's Progress     Medication Management        I will take my medication as directed.     Barriers: none  Plan for overcoming my barriers: Patient's spouse states patient will take medication as directed  Confidence: 9/10  Anticipated Goal Completion Date: within 30 days               LPN Care Coordinator provided contact information. Plan for follow-up call in 5-7 days based on severity of symptoms and risk factors. Plan for next call: self management-diet, hydration, medication compliance, fall and safety precautions and follow up appointment.     Margarito Ma LPN

## 2023-02-12 LAB
BACTERIA SPEC CULT: NORMAL
SERVICE CMNT-IMP: NORMAL

## 2023-02-13 ENCOUNTER — HOSPITAL ENCOUNTER (OUTPATIENT)
Dept: WOUND CARE | Age: 78
Discharge: HOME | End: 2023-02-13
Payer: MEDICARE

## 2023-02-13 ENCOUNTER — TELEPHONE (OUTPATIENT)
Dept: INTERNAL MEDICINE CLINIC | Facility: CLINIC | Age: 78
End: 2023-02-13

## 2023-02-13 VITALS
DIASTOLIC BLOOD PRESSURE: 78 MMHG | RESPIRATION RATE: 20 BRPM | SYSTOLIC BLOOD PRESSURE: 126 MMHG | BODY MASS INDEX: 31.94 KG/M2 | HEIGHT: 73 IN | WEIGHT: 241 LBS | HEART RATE: 61 BPM

## 2023-02-13 DIAGNOSIS — R53.81 PHYSICAL DEBILITY: ICD-10-CM

## 2023-02-13 DIAGNOSIS — E11.51 DM (DIABETES MELLITUS) TYPE II, CONTROLLED, WITH PERIPHERAL VASCULAR DISORDER (HCC): ICD-10-CM

## 2023-02-13 DIAGNOSIS — I70.213 ATHEROSCLEROSIS OF NATIVE ARTERY OF BOTH LOWER EXTREMITIES WITH INTERMITTENT CLAUDICATION (HCC): ICD-10-CM

## 2023-02-13 DIAGNOSIS — Z79.02 ANTIPLATELET OR ANTITHROMBOTIC LONG-TERM USE: ICD-10-CM

## 2023-02-13 DIAGNOSIS — L97.422 NON-PRESSURE CHRONIC ULCER OF LEFT HEEL AND MIDFOOT WITH FAT LAYER EXPOSED (HCC): Primary | ICD-10-CM

## 2023-02-13 DIAGNOSIS — I50.22 CHRONIC SYSTOLIC CONGESTIVE HEART FAILURE (HCC): Primary | ICD-10-CM

## 2023-02-13 DIAGNOSIS — E11.42 DIABETIC POLYNEUROPATHY ASSOCIATED WITH TYPE 2 DIABETES MELLITUS (HCC): ICD-10-CM

## 2023-02-13 DIAGNOSIS — Z98.890 H/O ENDARTERECTOMY: ICD-10-CM

## 2023-02-13 DIAGNOSIS — I73.9 PAD (PERIPHERAL ARTERY DISEASE) (HCC): ICD-10-CM

## 2023-02-13 DIAGNOSIS — J44.9 CHRONIC OBSTRUCTIVE PULMONARY DISEASE, UNSPECIFIED COPD TYPE (HCC): ICD-10-CM

## 2023-02-13 DIAGNOSIS — I70.90: ICD-10-CM

## 2023-02-13 PROCEDURE — 15275 SKIN SUB GRAFT FACE/NK/HF/G: CPT | Performed by: SURGERY

## 2023-02-13 PROCEDURE — 15004 WOUND PREP F/N/HF/G: CPT | Performed by: SURGERY

## 2023-02-13 PROCEDURE — 15271 SKIN SUB GRAFT TRNK/ARM/LEG: CPT

## 2023-02-13 RX ORDER — LIDOCAINE HYDROCHLORIDE 20 MG/ML
JELLY TOPICAL ONCE
OUTPATIENT
Start: 2023-02-13 | End: 2023-02-13

## 2023-02-13 RX ORDER — GINSENG 100 MG
CAPSULE ORAL ONCE
OUTPATIENT
Start: 2023-02-13 | End: 2023-02-13

## 2023-02-13 RX ORDER — BACITRACIN ZINC AND POLYMYXIN B SULFATE 500; 1000 [USP'U]/G; [USP'U]/G
OINTMENT TOPICAL ONCE
OUTPATIENT
Start: 2023-02-13 | End: 2023-02-13

## 2023-02-13 RX ORDER — BACITRACIN, NEOMYCIN, POLYMYXIN B 400; 3.5; 5 [USP'U]/G; MG/G; [USP'U]/G
OINTMENT TOPICAL ONCE
OUTPATIENT
Start: 2023-02-13 | End: 2023-02-13

## 2023-02-13 RX ORDER — GENTAMICIN SULFATE 1 MG/G
OINTMENT TOPICAL ONCE
OUTPATIENT
Start: 2023-02-13 | End: 2023-02-13

## 2023-02-13 RX ORDER — CLOBETASOL PROPIONATE 0.5 MG/G
OINTMENT TOPICAL ONCE
OUTPATIENT
Start: 2023-02-13 | End: 2023-02-13

## 2023-02-13 RX ORDER — LIDOCAINE 40 MG/G
CREAM TOPICAL ONCE
OUTPATIENT
Start: 2023-02-13 | End: 2023-02-13

## 2023-02-13 RX ORDER — LIDOCAINE 50 MG/G
OINTMENT TOPICAL ONCE
OUTPATIENT
Start: 2023-02-13 | End: 2023-02-13

## 2023-02-13 RX ORDER — BETAMETHASONE DIPROPIONATE 0.05 %
OINTMENT (GRAM) TOPICAL ONCE
OUTPATIENT
Start: 2023-02-13 | End: 2023-02-13

## 2023-02-13 RX ORDER — LIDOCAINE HYDROCHLORIDE 40 MG/ML
SOLUTION TOPICAL ONCE
OUTPATIENT
Start: 2023-02-13 | End: 2023-02-13

## 2023-02-13 NOTE — WOUND CARE
Discharge Instructions for  Windy Fairchild  [address] [facility] [address]  [facility] 12, [address]  Phone [phone]   Fax [phone]      NAME:  Kamron Gallegos OF BIRTH:  1945  MEDICAL RECORD NUMBER:  [id number]  DATE:  2/13/2023    Return Appointment:   2 weeks with Miranda Segura MD  1 week with Clinician for Dressing Change      Instructions: Left Heel:  Cleanse wound and periwound with wound cleanser or normal saline. Cleanse with Vashe. Applied #6 Puraply (1.6 cm disc) ( lot: [id number]. 1.1J  exp: 04/29/2025)  Secured with mepilex transfer, dry gauze, rolled gauze  Follow with [employer] [facility]. Dressing change 1x weekly at [address] for [facility] next appointment. Offload pressure from heel through use of wheelchair. Should you experience increased redness, swelling, pain, foul odor, size of wound(s), or have a temperature over 101 degrees please contact the [address] [place] [address] Road at [phone] or if after hours contact your primary care physician or go to the hospital emergency department. PLEASE NOTE: IF YOU ARE UNABLE TO OBTAIN WOUND SUPPLIES, CONTINUE TO USE THE SUPPLIES YOU HAVE AVAILABLE UNTIL YOU ARE ABLE TO REACH US. IT IS MOST IMPORTANT TO KEEP THE WOUND COVERED AT ALL TIMES.     Electronically signed Quinton Márquez RN on 2/13/2023 at 10:06 AM

## 2023-02-13 NOTE — TELEPHONE ENCOUNTER
[facility]      Name of the Nurse Calling and Facility: Glenroy Galan Contact Number to Reach the Nurse: [phone]          Reason For Call: Verbal orders for 1-2 times a week through cerification period    Also medication flagged    Carvedilol with fluticasone and albuterol

## 2023-02-13 NOTE — PROGRESS NOTES
[address] [facility] [address]    [address] [place], [place]  Consult Note/H&P/Progress Note      [address] RECORD NUMBER:  [id number]  AGE: 65 y.o. RACE White (non-)  GENDER: male  : 1945  EPISODE DATE:  2023       Subjective:      CC (Chief Complaint) and HISTORY of PRESENT ILLNESS (HPI):    Yann Nascimento is a 65 y.o. male who presents today for wound/ulcer evaluation. He presents on 10/24/2022 with an ulcer wound on the left heel that has been present for several months. He was in his usual state of poor health when in July he had back surgery which led to a complicated abscess, prolonged hospital stay, and rehabilitation stay which ended just a few weeks ago. He is currently at home. He has significant debility. He is diabetic and has significant peripheral vascular disease with history of bilateral femoral endarterectomies. These were performed several years ago by Dr. Kelsea Randhawa. ABIs were performed in July just prior to his surgery with the JOVANA on the left of 0.6 with plan for follow-up this coming January to monitor. He has been lying in bed without use of Rooke boots and has developed an ulcer on the left heel. He has a history of MRSA infection in his back and was placed on a course of doxycycline. There is no evidence of active cellulitis today. He has had no imaging. He has not had repeat vascular evaluation. There is some description of pain in his leg at rest.  He is not active enough to discern any possible claudication. He is anticoagulated on both Eliquis and Plavix. There is no current dressing care plan. He returns on 10/31/2022. We are very fortunate that he was evaluated by vascular surgery who performed an arteriogram showing significant arterial disease. Unfortunately, his disease is not amenable to percutaneous intervention and will require a bypass in the coming weeks.   Debridement was not aggressively performed today but some loose tissue was selectively debrided and well-tolerated. He has been participating with home therapy including ambulation with a walker. Current dressing is Betadine. He returns on 11/10/2022. He is doing better with no evidence of cellulitis. He has completed antibiotics. The ulcer looks improved with current dressing of Betadine. He sees vascular on Monday to schedule bypass. He returns on 12/1/2022. He is actually improving with the heel ulcer showing improvement with just Betadine dressings. I believe he is offloading better than previously. I am not sure that the wife grasps the offloading and heel offloading concept. Vascular surgery has him set up for Femoral to distal bypass in January, but they feel he may heal without it. He was seen by ID who did not recommend further antibiotics for his foot or for his discitis. They recommend against the bypass if he continues to heal as well. He also developed issues with his eye with an acute glaucoma attack. He is being treated with nonsurgical methods at present but may need cataract surgery. He returns on 12/8/2022. There is some wound improvement. We performed skin substitute grafting with PuraPly AM #1 today which was well-tolerated. Offloading and protecting from pressure was again stressed. He returns on 12/15/2022. There is improvement and better offloading. PuraPly AM #2 was applied today and well-tolerated. He returns today on 1/19/2023. He was recently hospitalized with bacteremia of unknown source. The source was not his foot. He missed his last appointment due to this. He is doing better and the wound actually is improved probably from better offloading. PuraPly AM #3 was applied today and well tolerated. He returns on 1/26/2023. He remains well. The wound is improving and PuraPly AM #4 was applied today and well-tolerated. He returns on 2/2/2023. No medical issues.   The wound continues to improve and PuraPly AM #5 was applied today and well-tolerated. He returns on 2/13/2023. He was just released from hospital admission for pneumonia with hypoxia. He is doing better now with increased ambulation. There is possibly some slight deterioration of the wound that may be related to his hospitalization. Is still remains small and PuraPly AM #6 was applied today and well-tolerated.     This information was obtained from the patient  The following HPI elements were documented for the patient's wound:  Location: Left heel  Duration: August/September 2022  Severity: Fat layer exposed  Context: Limited activity, much lying in bed, PVD, DM, no offloading  Modifying Factors:  Nothing makes better or worse  Quality: Full-thickness, painful  Timing: Constant  Associated Signs and Symptoms: Tissue loss and possible bout of cellulitis      Ulcer Identification:  Ulcer Type: Diabetic foot ulcer left heel with fat layer exposed  Contributing Factors: Inadequate offloading, PVD, anticoagulated    PuraPlyAM: #1 (12/8/2022), #2 (12/15), #3 (1/19/2023), #4 (1/26), #5 (2/2), #6 (2/13)        PAST MEDICAL HISTORY  Past Medical History:   Diagnosis Date    Acute respiratory failure with hypoxia ([address] 10/23/2014    MINI (acute kidney injury) ([address] 09/15/2014    MINI (acute kidney injury) ([address]     MINI (acute kidney injury) ([address]     Allergic rhinitis 11/10/2015    Asthma 11/10/2015    Benign essential hypertension 11/10/2015    Benign neoplasm of colon 11/10/2015    CAD (coronary artery disease) 11/10/2015    CAD (coronary artery disease) 1998, 1999    mix2, 3 stents [id number]    CAP (community acquired pneumonia) 12/31/2020    Cardiomyopathy ([address] 70/81/9314    Complicated wound infection 11/10/2015    COPD (chronic obstructive pulmonary disease) with emphysema ([address] 11/10/2015    COPD exacerbation ([address] 10/12/2011    COVID-19 12/31/2020    Diabetes mellitus type 2, controlled ([address] 11/10/2015    doesn/t check daily oral meds, A1c 6.0 (8/10/22) Diabetic neuropathy ([facility] 11/10/2015    Disruption of wound, unspecified 10/09/2014    Encounter for long-term (current) use of other high-risk medications 11/10/2015    Extremity atherosclerosis with intermittent claudication ([address] 11/10/2015    H/O endarterectomy 11/10/2015    Hiatal hernia 11/10/2015    History of 2019 novel coronavirus disease (COVID-19)     12/31/2020- hospitalized    Hyperglycemia due to type 1 diabetes mellitus ([address] 11/10/2015    Hyperlipidemia 11/10/2015    ICD (implantable cardioverter-defibrillator) in place 06/16/2022    Monomorphic ventricular tachycardia 12/31/2020    Myocardial infarction [facility]) 1999    Myocardial infarction ([facility] 11/10/2015    Obesity 11/10/2015    Orthostatic hypotension 11/10/2015    Osteoarthritis 11/10/2015    Peripheral vascular disease ([facility] 11/10/2015    PNA (pneumonia) 02/08/2019    PVC (premature ventricular contraction)     Rash 88/97/9022    Seroma complicating a procedure 10/02/2014    Sleep apnea     cpap    Toe laceration     Type 2 diabetes with nephropathy ([facility]     Uncontrolled type 2 diabetes mellitus 11/10/2015    Vertiginous syndromes and other disorders of vestibular system 11/10/2015        PAST SURGICAL HISTORY  Past Surgical History:   Procedure Laterality Date    CARDIAC CATHETERIZATION  12/05/2018    LV EF=40%. LM:nl. LAD:30-40% mid stenosis. LCX OM1:95% stenosis. RCA:100% occlusion at RV branch.     CATARACT REMOVAL Right 07/20/2022    CORONARY ANGIOPLASTY WITH STENT PLACEMENT  12/05/2018    LCX OM1:2.5 x 12 Giuseppe RAKESH    CORONARY ANGIOPLASTY WITH STENT PLACEMENT  1999    [place] UNLISTED PROCEDURE ABDOMEN PERITONEUM & OMENTUM  1960    abdominal cyst removed     [address]    stents x3    SPINE SURGERY N/A 07/19/2022    LUMBAR 2, LUMBAR 4 KYPHOPLASTY AND ANY OTHER INDICATED LEVELS performed by Thuan Delaney MD at [address] [place] [address]  11/5/14    Repair of right common femoral artery     VASCULAR SURGERY 14    tiffanie femoral endarterectomy    VASCULAR SURGERY Left 10/28/2022    LEFT LOWER EXTREMITY ARTERIOGRAM / ULTRASOUND GUIDED ACCESS   performed by Miguelina Archibald MD at [facility] MAIN OR       FAMILY HISTORY  Family History   Problem Relation Age of Onset    Breast Cancer Mother     Hypertension Mother     Other Father         DIVERTICULITIS    Crohn's Disease Sister     Lung Disease Brother         mesothioloma    Diabetes Sister     Heart Attack Father     Heart Disease Father     Stroke Mother        SOCIAL HISTORY  Social History     Tobacco Use    Smoking status: Former     Packs/day: 1.00     Years: 50.00     Pack years: 50.00     Types: Cigarettes     Quit date: 10/2/2011     Years since quittin.3    Smokeless tobacco: Current     Types: Chew    Tobacco comments:     Chews tobacco daily   Vaping Use    Vaping Use: Never used   Substance Use Topics    Alcohol use:  Yes     Alcohol/week: 0.0 standard drinks    Drug use: No       ALLERGIES  Allergies   Allergen Reactions    Acetaminophen Hives     Per spouse has small amount of hives, takes 1/2 and tolerates     Azithromycin Hives    Morphine Hallucinations     Muscle twitching. jerking    Oxaprozin Other (See Comments)     ELEVATED BLOOD PRESSURE    Oxycodone Anxiety       MEDICATIONS  Current Outpatient Medications on File Prior to Encounter   Medication Sig Dispense Refill    carvedilol (COREG) 6.25 MG tablet Take 1 tablet by mouth 2 times daily (with meals) 60 tablet 1    ferrous sulfate (IRON 325) 325 (65 Fe) MG tablet Take 1 tablet by mouth daily (with breakfast) 30 tablet 3    cefdinir (OMNICEF) 300 MG capsule Take 1 capsule by mouth 2 times daily for 4 days 8 capsule 0    montelukast (SINGULAIR) 10 MG tablet TAKE 1 TABLET BY MOUTH EVERY DAY AT BEDTIME 90 tablet 3    magnesium oxide (MAG-OX) 400 (240 Mg) MG tablet TAKE 1 TABLET BY MOUTH EVERY DAY 90 tablet 3    albuterol (PROVENTIL) (2.5 MG/3ML) 0.083% nebulizer solution 1 vial via nebulizer 4 times daily if needed for shortness of breath or wheezing. Diagnosis-COPD, J44.9. Bill to Medicare part B 360 mL 11    senna-docusate (PERICOLACE) 8.6-50 MG per tablet Take 1 tablet by mouth nightly      CPAP Machine MISC by Does not apply route      albuterol sulfate HFA (PROVENTIL;VENTOLIN;PROAIR) 108 (90 Base) MCG/ACT inhaler USE 2 PUFFS BY INHALATION 4 TIMES DAILY AS NEEDED FOR WHEEZING OR SHORTNESS OF BREATH. Patient prefers ventolin. 18 g 11    GUAIFENESIN 1200 PO Take 1 tablet by mouth every 12 hours as needed      acetaminophen (TYLENOL) 500 MG tablet Take 500 mg by mouth every 6 hours as needed for Pain Taking 1/2 two times daily      glipiZIDE (GLUCOTROL XL) 10 MG extended release tablet Take 1 tablet by mouth daily 30 tablet 0    furosemide (LASIX) 20 MG tablet Take 1 tablet by mouth in the morning. (Patient taking differently: Take 20 mg by mouth daily 1/2 tablet) 60 tablet 3    [DISCONTINUED] amLODIPine (NORVASC) 5 MG tablet Take 1 tablet by mouth in the morning. 30 tablet 3    [DISCONTINUED] QUEtiapine (SEROQUEL) 25 MG tablet Take 1 tablet by mouth in the morning. 60 tablet 3    rosuvastatin (CRESTOR) 10 MG tablet TAKE 1 TABLET BY MOUTH EVERY DAY (Patient taking differently: at bedtime) 90 tablet 0    fluticasone-salmeterol (ADVAIR) 250-50 MCG/ACT AEPB diskus inhaler Inhale 1 puff into the lungs in the morning and at bedtime      amiodarone (CORDARONE) 200 MG tablet Take 200 mg by mouth daily      apixaban (ELIQUIS) 5 MG TABS tablet Take 5 mg by mouth every 12 hours      clopidogrel (PLAVIX) 75 MG tablet Take 75 mg by mouth daily      fluticasone (FLONASE) 50 MCG/ACT nasal spray USE 1 OR 2 SPRAYS IN EACH NOSTRIL EVERY DAY.       latanoprost (XALATAN) 0.005 % ophthalmic solution Apply 1 drop to eye nightly      nitroGLYCERIN (NITROSTAT) 0.4 MG SL tablet Place 0.4 mg under the tongue      [DISCONTINUED] metFORMIN (GLUCOPHAGE) 500 MG tablet TAKE TWO TABLETS BY MOUTH 2 TIMES A DAY       No current facility-administered medications on file prior to encounter. REVIEW OF SYSTEMS  The patient has no difficulty with chest pain or shortness of breath. No fever or chills. Comprehensive review of systems was otherwise unremarkable except as noted above. Objective:   Physical Exam:   Recent vitals (if inpt):  Patient Vitals for the past 24 hrs:   BP Pulse Resp Height Weight   02/13/23 0938 -- -- -- 6' 1\" (1.854 m) 241 lb (109.3 kg)   02/13/23 0928 126/78 61 20 -- --         /78   Pulse 61   Resp 20   Ht 6' 1\" (1.854 m)   Wt 241 lb (109.3 kg)   BMI 31.80 kg/m²   Wt Readings from Last 3 Encounters:   02/13/23 241 lb (109.3 kg)   02/08/23 241 lb 10 oz (109.6 kg)   01/26/23 226 lb (102.5 kg)     Constitutional: Alert, oriented, cooperative patient in no acute distress; appears stated age;  General appearance is within normal limits for wound care patient population. See the today's recorded vitals signs and constitutional data. Eyes: Pupils equal; Sclera are clear. EOMs intact; The eyes appear to track and move normally. The sclera are not injected. The conjunctive are clear. The eyelids are normal. There is no scleral icterus. ENMT: There are no obvious external ear, nose, lip or mouth lesions. Nares normal; Neck: Overall contour of the neck is normal with no obvious neck masses. Gross hearing is within normal limits. No obvious neck masses  Resp:  Breathing is non-labored; normal rate and effort; no audible wheezing. CV: RRR;  no JVD; No evidence of cyanosis of the upper extremities. The extremities are perfused without embolic sign, splinter hemorrhages, or petechia. GI: soft and non-distended without acute abnormality noted. Musculoskeletal: unremarkable with normal function. No embolic signs or cyanosis. Neuro:  Oriented; moves all 4; no focal deficits  Psychiatric: Judgement and insight are within normal limits for the wound care population of patients.  Patient is oriented to person, place, and time. Recent and remote memory are within normal limits. Mood and affect are within normal limits. Integumentary (Skin/Wounds)  See inspection of wound(s) below. There are no other skin areas of palpable concern. See wound center documentation including photos in the [facility] [address] [place] [address] Wound Template made part of this record by reference. Wound Care Documentation:    Wound 09/09/22 Heel Left;Lateral (Active)   Wound Image   02/13/23 0931   Wound Etiology Diabetic Martinez 2 02/13/23 0931   Dressing Status Old drainage noted 02/13/23 0931   Wound Cleansed Cleansed with saline 02/13/23 0931   Dressing/Treatment Alginate with Ag 02/13/23 0931   Offloading for Diabetic Foot Ulcers Other (comment) 02/13/23 0931   Wound Length (cm) 1 cm 02/13/23 0931   Wound Width (cm) 1.3 cm 02/13/23 0931   Wound Depth (cm) 0.2 cm 02/13/23 0931   Wound Surface Area (cm^2) 1.3 cm^2 02/13/23 0931   Change in Wound Size % (l*w) 78.33 02/13/23 0931   Wound Volume (cm^3) 0.26 cm^3 02/13/23 0931   Wound Healing % 90 02/13/23 0931   Wound Assessment Pale granulation tissue 02/13/23 0931   Drainage Amount Moderate 02/13/23 0931   Drainage Description Serosanguinous 02/13/23 0931   Odor None 02/13/23 0931   Joanna-wound Assessment Intact 02/13/23 0931   Margins Attached edges 02/08/23 1425   Wound Thickness Description not for Pressure Injury Full thickness 02/13/23 0931   Number of days: 156       Wound 09/13/22 Buttocks moisture skin damage (Active)   Number of days: 153        Initial WC visit 10/24/2022      F/U on 12/1/2022    Amount and/or Complexity of Data Reviewed and Analyzed:  I reviewed and analyzed all of the unique labs and radiologic studies that are shown below as well as any that are in the HPI, and any that are in the expanded problem list below  *Each unique test, order, or document contributes to the combination of 2 or combination of 3 in Category 1 below.   I also independently reviewed radiology images for studies I described above in the HPI or studies I have ordered. For this visit I also reviewed old records and prior notes. No results for input(s): WBC, HGB, PLT, NA, K, CL, CO2, BUN, CREA, GLU, INR, APTT, ALT, AML, AML, LCAD, PCO2, PO2, HCO3 in the last 72 hours. Invalid input(s): PTP, TBIL, TBILI, CBIL, SGOT, GPT, AP, LPSE, NH4, TROPT, TROIQ,  PH    No results for input(s): INR, APTT, ALT, AML in the last 72 hours.     Invalid input(s): PTP, TBIL, TBILI, CBIL, SGOT, GPT, AP, LPSE      Most recent blood counts (CBC) review  Lab Results   Component Value Date    WBC 6.5 02/09/2023    HGB 8.0 (L) 02/09/2023    HCT 24.5 (L) 02/09/2023    MCV 95.7 02/09/2023     02/09/2023     Most recent chemistry (BMP) test review   Lab Results   Component Value Date/Time     02/09/2023 04:44 AM    K 3.5 02/09/2023 04:44 AM     02/09/2023 04:44 AM    CO2 24 02/09/2023 04:44 AM    BUN 16 02/09/2023 04:44 AM    CREATININE 0.80 02/09/2023 04:44 AM    GLUCOSE 121 02/09/2023 04:44 AM    CALCIUM 8.7 02/09/2023 04:44 AM      Most recent Liver enzyme test review  Lab Results   Component Value Date    ALT 23 02/06/2023    AST 10 (L) 02/06/2023    ALKPHOS 78 02/06/2023    BILITOT 1.2 (H) 02/06/2023     Most recent coagulation (coags) review  @lastinr@  Lab Results   Component Value Date/Time    INR 1.3 07/30/2022 09:53 AM    APTT 63.6 08/02/2022 05:41 AM    APTT 24.3 03/04/2022 03:57 PM       Diabetic assessment  Hemoglobin A1C   Date Value Ref Range Status   12/29/2022 5.6 4.8 - 5.6 % Final       Nutritional assessment screen to assess wound healing ability:  Lab Results   Component Value Date    LABALBU 3.1 (L) 02/06/2023     No results found for: TP, ALBR, ALB    Xray Result (most recent):  XR CHEST PORTABLE 02/06/2023    Narrative  EXAMINATION: XR CHEST PORTABLE    DATE: 2/6/2023 10:30 PM    INDICATION: ; Sepsis    COMPARISON:  December 20, 2022    FINDINGS:      Moderate right lower lung consolidative opacity is new. Minor background  interstitial opacities are redemonstrated. No visible pleural effusion or pneumothorax. Cardiomediastinal silhouette  unchanged. Cardiac device with leads in the right  atrium and partly imaged in the right ventricle. Impression  1. Right lower pneumonia. Recommend chest radiographic follow-up in eight weeks  for resolution. Richmond Noel M.D.  2/6/2023 11:37:00 PM    CT Result (most recent):  CT HEAD WO CONTRAST 12/29/2022    Narrative  CT HEAD WITHOUT CONTRAST, 12/29/2022    History: Altered mental status. Comparison: None    Technique:   5 mm axial scans from the skull base to the vertex. All CT scans  performed at this facility use one or all of the following: Automated exposure  control, adjustment of the mA and/or kVp according to patient's size, iterative  reconstruction. Findings:  No evidence of intracranial hemorrhage is seen. No abnormal  extra-axial fluid collections are seen. Age related chronic cortical volume  loss is seen. No evidence for acute hydrocephalus is seen. No evidence of  midline shift or herniation is seen. No abnormal edema pattern is seen in a  vascular distribution to suggest large artery infarction. Evaluation with bone windows shows no acute osseous changes. There is a  moderate right mastoid effusion with fluid entering the right middle ear. Impression  1. No acute intracranial process evident by noncontrast CT study of the head. 2.  Moderate right mastoid effusion with fluid entering the right middle ear. This can been indicator for right otomastoiditis. Recommend clinical  correlation. This report was made using voice transcription.  Despite my best efforts to avoid  any, transcription errors may persist. If there is any question about the  accuracy of the report or need for clarification, then please call [phone], or text me through [employer] for clarification or correction. 07/06/22    VAS DUP LOWER EXT ARTERIES BILATERAL W JOVANA 07/07/2022  7:57 AM (Final)    Interpretation Summary    Right lower extremity: The JOVANA (ankle-brachial index) is 0.78 and is abnormal. The lower extremity arterial duplex reveals an occlusion of the proximal superficial femoral artery with reconstitution at the distal proximal superficial femoral artery. There is monophasic flow in the GILBERTO, PTA and peroneal arteries at the ankle. The great toe pressure is 64mmHg. Left lower extremity: Right lower extremity: The JOVANA (ankle-brachial index) is 0.60 and is abnormal. The lower extremity arterial duplex reveals an occlusion of the proximal superficial femoral artery with reconstitution at the below knee popliteal artery. There is monophasic flow in the GILBERTO, PTA and peroneal arteries at the ankle. The great toe pressure is 63mmHg. The abdominal aorta was evaluated and measured 2.8cm x 2.9cm at its maximum diameter, no aneurysm visualized on limited evaluation of abdominal aorta. Signed by: Yunior Ortiz MD on 7/7/2022  7:57 AM        Admission date (for inpatients): 2/13/2023   Day of Surgery  * No procedures listed *    Procedure:  Skin Substitute Application    Performed by: Louis River MD  Ulcer Type: arterial and diabetic  Consent obtained: Yes  Time out taken: Yes    Product Utilized:  PuraPly AM 1.6 sq/cm  Fenestrated at the time of procedure: Yes  Instrument(s) utilized during the procedure: #15 surgical blade    Skin Substitute was Applied to Ulcer Number(s):    Ulcer #: 1  Total Surface Area of Ulcer(s) Covered 1.3 sq/cm  Was the Product Layered  No   Amount of Product Applied 1.6 sq/cm   Amount of Product Wasted 0 sq/cm   Reason for Waste: N/A. Skin Substitute was surgically fixated and secured with Other: silicone dressing.     Wound 09/09/22 Heel Left;Lateral (Active)   Wound Image   02/13/23 0931   Wound Etiology Diabetic Martinez 2 02/13/23 0931   Dressing Status Old drainage noted 02/13/23 0931   Wound Cleansed Cleansed with saline 02/13/23 0931   Dressing/Treatment Alginate with Ag 02/13/23 0931   Offloading for Diabetic Foot Ulcers Other (comment) 02/13/23 0931   Wound Length (cm) 1 cm 02/13/23 0931   Wound Width (cm) 1.3 cm 02/13/23 0931   Wound Depth (cm) 0.2 cm 02/13/23 0931   Wound Surface Area (cm^2) 1.3 cm^2 02/13/23 0931   Change in Wound Size % (l*w) 78.33 02/13/23 0931   Wound Volume (cm^3) 0.26 cm^3 02/13/23 0931   Wound Healing % 90 02/13/23 0931   Wound Assessment Pale granulation tissue 02/13/23 0931   Drainage Amount Moderate 02/13/23 0931   Drainage Description Serosanguinous 02/13/23 0931   Odor None 02/13/23 0931   Joanna-wound Assessment Intact 02/13/23 0931   Margins Attached edges 02/08/23 1425   Wound Thickness Description not for Pressure Injury Full thickness 02/13/23 0931   Number of days: 156       Wound 09/13/22 Buttocks moisture skin damage (Active)   Number of days: 153      Procedural Pain: 0/10   Post Procedural Pain: 0 / 10  Response to Treatment: Patient tolerated procedure well with no complaints of pain. 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 *  Resolved Problems:    * No resolved hospital problems.  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 ([facility] 10/24/2022     Priority: High    Antiplatelet or antithrombotic long-term use 10/24/2022     Priority: High     Eliquis and Plavix      Chest pain 12/05/2018     Priority: High    Acute respiratory failure with hypoxia ([facility] 02/07/2023     Priority: Medium    Hospital-acquired bacterial pneumonia 02/07/2023     Priority: Medium    COPD exacerbation ([facility] 12/29/2022     Priority: Medium    Influenza 12/29/2022     Priority: Medium    Lumbar discitis 09/08/2022     Priority: Medium    Psoas muscle abscess ([facility] 09/08/2022     Priority: Medium    Physical debility 08/26/2022     Priority: Medium    General weakness 07/27/2022     Priority: Medium    Acute cystitis without hematuria 07/27/2022     Priority: Medium    Low back pain without sciatica 07/27/2022     Priority: Medium    Back pain 07/27/2022     Priority: Medium    DNI (do not intubate) 07/27/2022     Priority: Medium    Elevated liver enzymes 07/27/2022     Priority: Medium    Anemia 07/27/2022     Priority: Medium    Hyponatremia 07/27/2022     Priority: Medium    ICD (implantable cardioverter-defibrillator) in place 06/16/2022     Priority: Medium    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, vertebra(e), init ([facility] 07/07/2022     Priority: Low     Added automatically from request for surgery [id number]      Atrial fibrillation ([facility] 03/04/2022     Priority: Low    Ventricular tachycardia 03/04/2022     Priority: Low    VT (ventricular tachycardia) 03/04/2022     Priority: Low    Acute metabolic encephalopathy 17/34/6902     Priority: Low    Irregular heart beat 03/02/2021     Priority: Low    PVC's (premature ventricular contractions) 03/02/2021     Priority: Low    Elevated troponin 12/31/2020     Priority: Low    Toe laceration 12/09/2019     Priority: Low Type 2 diabetes with nephropathy ([facility] 06/17/2019     Priority: Low    Hypoxia 02/08/2019     Priority: Low    Sepsis ([facility] 02/08/2019     Priority: Low    Abnormal nuclear cardiac imaging test 11/27/2018     Priority: Low    Cigarette nicotine dependence in remission 08/20/2018     Priority: Low    Chronic pain of right knee 12/14/2017     Priority: Low    Obstructive sleep apnea 08/11/2017     Priority: Low    Intermittent spinal claudication ([facility] 06/13/2017     Priority: Low    Pulmonary emphysema ([facility] 11/17/2016     Priority: Low    H/O endarterectomy 11/10/2015     Priority: Low    Complicated wound infection 11/10/2015     Priority: Low    Allergic rhinitis 11/10/2015     Priority: Low    Hiatal hernia 11/10/2015     Priority: Low    Diabetic neuropathy ([facility] 11/10/2015     Priority: Low    Osteoarthritis 11/10/2015     Priority: Low    Encounter for long-term (current) drug use 11/10/2015     Priority: Low    Benign neoplasm of colon 11/10/2015     Priority: Low    PAD (peripheral artery disease) ([facility] 11/10/2015     Priority: Low    Asthma 11/10/2015     Priority: Low    Orthostatic hypotension 11/10/2015     Priority: Low    Hyperlipidemia 11/10/2015     Priority: Low    S/P angioplasty with stent 11/10/2015     Priority: Low    COPD (chronic obstructive pulmonary disease) ([facility] 04/10/2015     Priority: Low    Arterial degeneration 11/05/2014     Priority: Low     11/6/14 (Dr Mccarthy) 1. Bleeding from right groin wound. 2. Disruption of right common femoral artery patch anastomosis and   possible arterial infection.         Normocytic anemia 10/23/2014     Priority: Low    MINI (acute kidney injury) ([facility] 09/15/2014     Priority: Low    DM (diabetes mellitus) type II, controlled, with peripheral vascular disorder ([facility] 09/11/2014     Priority: Low    Atherosclerosis of native arteries of extremity with intermittent claudication ([facility] 08/15/2014     Priority: Low     9/11/14 (Dr Mccarthy) Bilateral common femoral endarterectomies. Personal history of tobacco use, presenting hazards to health 02/12/2013     Priority: Low    Asbestos exposure 02/12/2013     Priority: Low    HTN (hypertension) 10/12/2011     Priority: Low    Severe obesity (BMI 35.0-39. 9) with comorbidity ([address] 10/12/2011     Priority: Low    Sleep apnea 10/12/2011     Priority: Low    Ischemic cardiomyopathy 10/12/2011     Priority: Low    Coronary artery disease involving native coronary artery of native heart without angina pectoris 10/12/2011     Priority: Low           Plan:     Number and Complexity of Problems addressed and   Risks of complications and/or morbidity of management    Treatment Note please see attached Discharge Instructions  Any procedures done today in the wound center (if documented in a separate note) in [facility] are made part of this record by reference  Written patient dismissal instructions given to patient or POA. 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 He has had an ulcer on the left heel for several months. He has been debilitated following back surgery but is also debilitated from multiple medical problems including cardiac disease, peripheral vascular disease, and diabetes. He is anticoagulated on Eliquis and Plavix. He has had femoral endarterectomies in the past and is now followed by Dr. Alex Choudhury. JOVANA in July was abnormal and has not been reevaluated since deterioration of his tissue. He is scheduled for repeat JOVANA in January and we will consult vascular surgery for earlier evaluation. Vascular consultation to follow as needed.   He has not been offloading despite having work boots. There is clearly a pressure component. There is no mirror lesion on the right heel and may reflect his discrepancy in blood supply. He does describe some episodes that could be rest pain though it does not drop his legs for improvement. This may also be limited by his debility. We will not do debridement today. We will dress with Betadine and an absorbent pad and follow-up in 1 week. Offloading was emphasized. He returns on 10/31/2022. He underwent arteriography but is not a candidate for percutaneous intervention. He is being set up for formal bypass in November. Therapy is having him walk on his foot and offloading was again stressed. Current dressing remains Betadine. I will see him back in 1.5 weeks which will be before his bypass surgery. He returns on 11/10/2022. He is improved and has completed antibiotics. Current dressing is Betadine. He will see vascular surgery on Monday to schedule bypass surgery. He returns on 12/1/2022. He was seen by vascular surgery who have set him up for femoral to distal bypass in January, but feel it may not be necessary as he continues to heal.  This is probably secondary to better offloading. He was also seen by ID who also feel that the bypass should be avoided if possible since he is continuing to heal.  I agree with both specialists that he is continuing to heal and we will move forward with adjuncts to healing. We will start with skin substitutes. I will order PuraPly AM for next visit. This would be the best place to start when we cannot perform aggressive debridement due to his vascular status. We will then transition to a growth factor product. Returns on 12/8/2022. There continues to be some improvement. Still some issues with understanding offloading. PuraPly AM #1 was applied today and well-tolerated. We will order a graft for next week. He returns on 12/15/2022. There is some improvement. They have better understanding of offloading. PuraPly AM #2 was applied today and well-tolerated. He returns on 1/19/2023. He missed an appointment because he was hospitalized with bacteremia of unknown source. It was not from his foot. The foot wound has actually improved, likely from improved offloading while being hospitalized. PuraPly AM #3 was applied today and well-tolerated. He returns on 1/26/2023. Remains well and the wound continues to get smaller using PuraPly. Graft #4 was applied today and well-tolerated. He returns on 2/2/2023. The wound continues to become smaller and more epithelialized. PuraPly AM graft #5 was applied today and well-tolerated. We will order another graft for next week. He returns on 2/13/2023. He was actually hospitalized for pneumonia with hypoxia and missed that appointment. He was dressed with Aquacel Ag while he was in the hospital.  Wound appears slightly larger today which may be due to his hospitalization. We will transition from PuraPly  to [facility] for his next appointment with me which will be in 2 weeks. He will return for a regular dressing change in 1 week. Wound Care  Orders Placed This Encounter   Procedures    Ambulatory Referral to DME     Referral Priority:   Routine     Referral Reason:   Specialty Services Required     Number of Visits Requested:   1       Return Appointment:   2 weeks with Jessica Luis MD  1 week with Clinician for Dressing Change        Instructions: Left Heel:  Cleanse wound and periwound with wound cleanser or normal saline. Cleanse with Vashe. Applied #6 Puraply (1.6 cm disc) ( lot: [id number]. 1.1J  exp: 04/29/2025)  Secured with mepilex transfer, dry gauze, rolled gauze  Follow with Tubigrip F. Dressing change 1x weekly at [address] for [facility] next appointment. Offload pressure from heel through use of wheelchair.       Level of MDM (2/3 elements below)  Number and Complexity of Problems Addressed Amount and/or Complexity of Data to be Reviewed and Analyzed  *Each unique test, order, or document contributes to the combination of 2 or combination of 3 in Category 1 below. Risk of Complications and/or Morbidity or Mortality of pt Management     52730  33655 SF Minimal  ?1self-limited or minor problem Minimal or none Minimal risk of morbidity from additional diagnostic testing or Rx   68023  55723 Low Low  ? 2or more self-limited or minor problems;    or  ? 1stable chronic illness;    or  ?1acute, uncomplicated illness or injury   Limited  (Must meet the requirements of at least 1 of the 2 categories)  Category 1: Tests and documents   ? Any combination of 2 from the following:  ?Review of prior external note(s) from each unique source*;  ?review of the result(s) of each unique test*;   ?ordering of each unique test*    or   Category 2: Assessment requiring an independent historian(s)  (For the categories of independent interpretation of tests and discussion of management or test interpretation, see moderate or high) Low risk of morbidity from additional diagnostic testing or treatment     77770  98638 Mod Moderate  ? 1or more chronic illnesses with exacerbation, progression, or side effects of treatment;    or  ?2or more stable chronic illnesses;    or  ?1undiagnosed new problem with uncertain prognosis;    or  ?1acute illness with systemic symptoms;    or  ?1acute complicated injury   Moderate  (Must meet the requirements of at least 1 out of 3 categories)  Category 1: Tests, documents, or independent historian(s)  ? Any combination of 3 from the following:   ?Review of prior external note(s) from each unique source*;  ?Review of the result(s) of each unique test*;  ?Ordering of each unique test*;  ?Assessment requiring an independent historian(s)    or  Category 2: Independent interpretation of tests   ? Independent interpretation of a test performed by another physician/other qualified health care professional (not separately reported);     or  Category 3: Discussion of management or test interpretation  ? Discussion of management or test interpretation with external physician/other qualified health care professional/appropriate source (not separately reported)   Moderate risk of morbidity from additional diagnostic testing or treatment  Examples only:  ?Prescription drug management - advanced wound care prescription Rx wound care products  (eg Iodosorb, hydrofera, silvadene, collagen, puracol plus, optiloc, biologics - placenta, Theraskin, Apligraf). Note also that if home health care is involved, they will not apply topical therapies without a prescription/order from physician      ? Decision regarding minor surgery with identified patient or procedure risk factors  ? Decision regarding elective major surgery without identified patient or procedure risk factors   ? Diagnosis or treatment significantly limited by social determinants of health       49114  85756 High High  ? 1or more chronic illnesses with severe exacerbation, progression, or side effects of treatment;    or  ?1 acute or chronic illness or injury that poses a threat to life or bodily function   Extensive  (Must meet the requirements of at least 2 out of 3 categories)  Category 1: Tests, documents, or independent historian(s)  ? Any combination of 3 from the following:   ?Review of prior external note(s) from each unique source*;  ?Review of the result(s) of each unique test*;   ?Ordering of each unique test*;   ?Assessment requiring an independent historian(s)    or   Category 2: Independent interpretation of tests   ? Independent interpretation of a test performed by another physician/other qualified health care professional (not separately reported);     or  Category 3: Discussion of management or test interpretation  ? Discussion of management or test interpretation with external physician/other qualified health care professional/appropriate source (not separately reported)   High risk of morbidity from additional diagnostic testing or treatment  Examples only:  ?Drug therapy requiring intensive monitoring for toxicity  ? Decision regarding elective major surgery with identified patient or procedure risk factors  ? Decision regarding emergency major surgery  ? Decision regarding hospitalization  ? Decision not to resuscitate or to de-escalate care because of poor prognosis                 I have personally performed a face-to-face diagnostic evaluation and management  service on this patient. I have independently seen the patient. I have independently obtained the above history from the patient/family. I have independently examined the patient with above findings. I have independently reviewed data/labs for this patient and developed the above plan of care (MDM).       Electronically signed by Vin Mcclendon MD on 2/13/2023 at 10:18 AM

## 2023-02-13 NOTE — FLOWSHEET NOTE
02/13/23 0931   Wound 09/09/22 Heel Left;Lateral   Date First Assessed/Time First Assessed: 09/09/22 1535   Present on Hospital Admission: Yes  Primary Wound Type: Diabetic Ulcer  Location: Heel  Wound Location Orientation: Left;Lateral   Wound Image    Wound Etiology Diabetic Martinez 2   Dressing Status Old drainage noted   Wound Cleansed Cleansed with saline   Dressing/Treatment Alginate with Ag   Offloading for Diabetic Foot Ulcers Other (comment)  (wheelchair)   Wound Length (cm) 1 cm   Wound Width (cm) 1.3 cm   Wound Depth (cm) 0.2 cm   Wound Surface Area (cm^2) 1.3 cm^2   Change in Wound Size % (l*w) 78.33   Wound Volume (cm^3) 0.26 cm^3   Wound Healing % 90   Wound Assessment Pale granulation tissue   Drainage Amount Moderate   Drainage Description Serosanguinous   Odor None   Joanna-wound Assessment Intact   Wound Thickness Description not for Pressure Injury Full thickness   Patient takes Eliquis and Plavix

## 2023-02-13 NOTE — WOUND CARE
PuraPly AMTreatment Note    NAME:  Elisa Briceno  YOB: 1945  MEDICAL RECORD NUMBER:  [id number]  DATE:  2/13/2023    Goal:  Patient will receive safe and proper application of skin substitute. Patient will comply with caring for dressing, and reporting complications. Expiration date checked immediately prior to use. Package intact prior to use and no damage noted. Transport temperature controlled and acceptable. PuraPly AM was removed from protective sterile packaging by provider and applied to prepared ulcer bed. PuraPly AM was hydrated with sterile normal saline per provider. PuraPly AM was applied to Left Heel and affixed with steri-strips by the provider. PuraPly AM was covered with non-adherent ulcer dressing. Applied ABD over non-adherent. Patient/caregiver was instructed not to remove dressing and to keep it clean and dry. Pt/family/caregiver was instructed on signs and symptoms of complications to report such as draining through dressing, dressing falling down/slipping, getting wet, or severe pain or tingling. PuraPly AM may be applied a total of 10 times per wound over a 12 week period. Date of first application of PuraPly for this current wound is December 8, 2022.    Guidelines followed    Electronically signed by Paris Jason RN on 2/13/2023 at 10:13 AM

## 2023-02-13 NOTE — DISCHARGE INSTRUCTIONS
Discharge Instructions for  Windy Fairchild  [address] [place] [address]  [address], [address]   Phone [phone]   Fax [phone]      NAME:  Scott Guzmán  YOB: 1945  MEDICAL RECORD NUMBER:  [id number]  DATE:  @ED@    Return Appointment:   2 weeks with Kamini Flores MD  1 week with Clinician for Dressing Change      Instructions: Left Heel:  Cleanse wound and periwound with wound cleanser or normal saline. Cleanse with Vashe. Applied #6 Puraply (1.6 cm disc) ( lot: [id number]. 1.1J  exp: 04/29/2025)  Secured with mepilex transfer, dry gauze, rolled gauze  Follow with Tubigrip F. Dressing change 1x weekly at [address] for [facility] next appointment. Offload pressure from heel through use of wheelchair. Should you experience increased redness, swelling, pain, foul odor, size of wound(s), or have a temperature over 101 degrees please contact the [address] [place] [address] Road at [phone] or if after hours contact your primary care physician or go to the hospital emergency department. PLEASE NOTE: IF YOU ARE UNABLE TO OBTAIN WOUND SUPPLIES, CONTINUE TO USE THE SUPPLIES YOU HAVE AVAILABLE UNTIL YOU ARE ABLE TO REACH US. IT IS MOST IMPORTANT TO KEEP THE WOUND COVERED AT ALL TIMES.     Electronically signed Tahir Dill RN on 2/13/2023 at 10:02 AM

## 2023-02-15 ENCOUNTER — OFFICE VISIT (OUTPATIENT)
Dept: INTERNAL MEDICINE CLINIC | Facility: CLINIC | Age: 78
End: 2023-02-15

## 2023-02-15 VITALS
HEIGHT: 73 IN | WEIGHT: 238 LBS | OXYGEN SATURATION: 96 % | RESPIRATION RATE: 17 BRPM | TEMPERATURE: 97.4 F | BODY MASS INDEX: 31.54 KG/M2 | SYSTOLIC BLOOD PRESSURE: 150 MMHG | HEART RATE: 60 BPM | DIASTOLIC BLOOD PRESSURE: 78 MMHG

## 2023-02-15 DIAGNOSIS — Z86.2 HISTORY OF ANEMIA: ICD-10-CM

## 2023-02-15 DIAGNOSIS — I10 PRIMARY HYPERTENSION: ICD-10-CM

## 2023-02-15 DIAGNOSIS — Z09 HOSPITAL DISCHARGE FOLLOW-UP: Primary | ICD-10-CM

## 2023-02-15 DIAGNOSIS — Z87.01 HISTORY OF BACTERIAL PNEUMONIA: ICD-10-CM

## 2023-02-15 LAB
ALBUMIN SERPL-MCNC: 3.2 G/DL (ref 3.2–4.6)
ALBUMIN/GLOB SERPL: 1 (ref 0.4–1.6)
ALP SERPL-CCNC: 81 U/L (ref 50–136)
ALT SERPL-CCNC: 30 U/L (ref 12–65)
ANION GAP SERPL CALC-SCNC: 5 MMOL/L (ref 2–11)
AST SERPL-CCNC: 15 U/L (ref 15–37)
BASOPHILS # BLD: 0 K/UL (ref 0–0.2)
BASOPHILS NFR BLD: 1 % (ref 0–2)
BILIRUB SERPL-MCNC: 0.6 MG/DL (ref 0.2–1.1)
BUN SERPL-MCNC: 8 MG/DL (ref 8–23)
CALCIUM SERPL-MCNC: 9 MG/DL (ref 8.3–10.4)
CHLORIDE SERPL-SCNC: 107 MMOL/L (ref 101–110)
CO2 SERPL-SCNC: 31 MMOL/L (ref 21–32)
CREAT SERPL-MCNC: 0.8 MG/DL (ref 0.8–1.5)
DIFFERENTIAL METHOD BLD: ABNORMAL
EOSINOPHIL # BLD: 0 K/UL (ref 0–0.8)
EOSINOPHIL NFR BLD: 1 % (ref 0.5–7.8)
ERYTHROCYTE [DISTWIDTH] IN BLOOD BY AUTOMATED COUNT: 17.2 % (ref 11.9–14.6)
GLOBULIN SER CALC-MCNC: 3.3 G/DL (ref 2.8–4.5)
GLUCOSE SERPL-MCNC: 119 MG/DL (ref 65–100)
HCT VFR BLD AUTO: 30.6 % (ref 41.1–50.3)
HGB BLD-MCNC: 10 G/DL (ref 13.6–17.2)
IMM GRANULOCYTES # BLD AUTO: 0 K/UL (ref 0–0.5)
IMM GRANULOCYTES NFR BLD AUTO: 1 % (ref 0–5)
LYMPHOCYTES # BLD: 1.1 K/UL (ref 0.5–4.6)
LYMPHOCYTES NFR BLD: 20 % (ref 13–44)
MCH RBC QN AUTO: 31.3 PG (ref 26.1–32.9)
MCHC RBC AUTO-ENTMCNC: 32.7 G/DL (ref 31.4–35)
MCV RBC AUTO: 95.9 FL (ref 82–102)
MONOCYTES # BLD: 0.4 K/UL (ref 0.1–1.3)
MONOCYTES NFR BLD: 7 % (ref 4–12)
NEUTS SEG # BLD: 4 K/UL (ref 1.7–8.2)
NEUTS SEG NFR BLD: 70 % (ref 43–78)
NRBC # BLD: 0 K/UL (ref 0–0.2)
PLATELET # BLD AUTO: 259 K/UL (ref 150–450)
PMV BLD AUTO: 9.8 FL (ref 9.4–12.3)
POTASSIUM SERPL-SCNC: 3.6 MMOL/L (ref 3.5–5.1)
PROT SERPL-MCNC: 6.5 G/DL (ref 6.3–8.2)
RBC # BLD AUTO: 3.19 M/UL (ref 4.23–5.6)
SODIUM SERPL-SCNC: 143 MMOL/L (ref 133–143)
WBC # BLD AUTO: 5.6 K/UL (ref 4.3–11.1)

## 2023-02-15 SDOH — ECONOMIC STABILITY: FOOD INSECURITY: WITHIN THE PAST 12 MONTHS, THE FOOD YOU BOUGHT JUST DIDN'T LAST AND YOU DIDN'T HAVE MONEY TO GET MORE.: NEVER TRUE

## 2023-02-15 SDOH — ECONOMIC STABILITY: FOOD INSECURITY: WITHIN THE PAST 12 MONTHS, YOU WORRIED THAT YOUR FOOD WOULD RUN OUT BEFORE YOU GOT MONEY TO BUY MORE.: NEVER TRUE

## 2023-02-15 SDOH — ECONOMIC STABILITY: INCOME INSECURITY: HOW HARD IS IT FOR YOU TO PAY FOR THE VERY BASICS LIKE FOOD, HOUSING, MEDICAL CARE, AND HEATING?: NOT HARD AT ALL

## 2023-02-15 SDOH — ECONOMIC STABILITY: HOUSING INSECURITY
IN THE LAST 12 MONTHS, WAS THERE A TIME WHEN YOU DID NOT HAVE A STEADY PLACE TO SLEEP OR SLEPT IN A SHELTER (INCLUDING NOW)?: NO

## 2023-02-15 ASSESSMENT — ENCOUNTER SYMPTOMS
COUGH: 0
SHORTNESS OF BREATH: 0
WHEEZING: 0
CHEST TIGHTNESS: 0

## 2023-02-15 NOTE — PATIENT INSTRUCTIONS
Monitor  blood pressure three times weekly and on those days  check twice daily and record. Contact the office for readings greater than 140/80 consistently, or for any new or worsening sx.

## 2023-02-15 NOTE — PROGRESS NOTES
2/15/2023 9:56 AM  Location:[facility] [address] [facility]  [place]  Patient #:  [id number]  YOB: 1945          YOUR LAST HEMOGLOBIN A1CS:   No results found for: HBA1C, [id number]    YOUR LAST LIPID PROFILE:   Lab Results   Component Value Date/Time    CHOL 77 03/05/2022 06:14 AM    HDL 45 03/05/2022 06:14 AM    VLDL 18 09/21/2021 02:54 PM         Lab Results   Component Value Date/Time    GFRAA >60 09/16/2022 04:42 AM    BUN 16 02/09/2023 04:44 AM     02/09/2023 04:44 AM    K 3.5 02/09/2023 04:44 AM     02/09/2023 04:44 AM    CO2 24 02/09/2023 04:44 AM           History of Present Illness     Chief Complaint   Patient presents with    Follow-Up from [address] [place] [address] Road: 2/9/23: Pneumonia. Mr. Radha Gillespie is a 65 y.o. male  who presents for TCM. Mr. Ernie Renner presents for TCM follow-up, pneumonia and hypoxia. He was admitted from February 6 through February 9 at [address] [place] [address] for acute respiratory failure with hypoxia, altered mental status and pneumonia. His history is significant for cardiomyopathy, heart failure, SHERI, PAD, COPD, hypertension, type 2 diabetes, hyperlipidemia and atrial fibrillation. He also has a slow healing wound on his heel and is followed by wound care. He was discharged on cefdinir and iron tablets for hemoglobin of 10. At his discharge she did not qualify for home O2. Several adjustments were made to his medications, his ARB was stopped for low blood pressures and his beta-blocker was decreased. He is here with his wife this morning, notes he is feeling well. He denies any fevers or chills, nausea or vomiting. His cough is pretty much resolved. He is still using his albuterol nebulizer treatments 3 times daily and wheezes intermittently. His wife is monitoring his oxygen level which is staying stable.   She has not heard from palliative medicine but does have center while coming out once a week to work on physical therapy. He is walking better with the help of a walker. He follows up next week for his slow healing wound on his heel. Palliative care was ordered after his discharge.          Allergies   Allergen Reactions    Acetaminophen Hives     Per spouse has small amount of hives, takes 1/2 and tolerates     Azithromycin Hives    Morphine Hallucinations     Muscle twitching. jerking    Oxaprozin Other (See Comments)     ELEVATED BLOOD PRESSURE    Oxycodone Anxiety     Past Medical History:   Diagnosis Date    Acute respiratory failure with hypoxia ([address] 10/23/2014    MINI (acute kidney injury) ([address] 09/15/2014    MINI (acute kidney injury) ([address]     MINI (acute kidney injury) ([address]     Allergic rhinitis 11/10/2015    Asthma 11/10/2015    Benign essential hypertension 11/10/2015    Benign neoplasm of colon 11/10/2015    CAD (coronary artery disease) 11/10/2015    CAD (coronary artery disease) 1998, 1999    mix2, 3 stents [id number]    CAP (community acquired pneumonia) 12/31/2020    Cardiomyopathy ([address] 30/96/2598    Complicated wound infection 11/10/2015    COPD (chronic obstructive pulmonary disease) with emphysema ([address] 11/10/2015    COPD exacerbation ([address] 10/12/2011    COVID-19 12/31/2020    Diabetes mellitus type 2, controlled ([address] 11/10/2015    doesn/t check daily oral meds, A1c 6.0 (8/10/22)    Diabetic neuropathy ([address] 11/10/2015    Disruption of wound, unspecified 10/09/2014    Encounter for long-term (current) use of other high-risk medications 11/10/2015    Extremity atherosclerosis with intermittent claudication ([address] 11/10/2015    H/O endarterectomy 11/10/2015    Hiatal hernia 11/10/2015    History of 2019 novel coronavirus disease (COVID-19)     12/31/2020- hospitalized    Hyperglycemia due to type 1 diabetes mellitus ([address] 11/10/2015    Hyperlipidemia 11/10/2015    ICD (implantable cardioverter-defibrillator) in place 06/16/2022    Monomorphic ventricular tachycardia 12/31/2020    Myocardial infarction ([address] 1999    Myocardial infarction ([facility]) 11/10/2015    Obesity 11/10/2015    Orthostatic hypotension 11/10/2015    Osteoarthritis 11/10/2015    Peripheral vascular disease ([facility]) 11/10/2015    PNA (pneumonia) 2019    PVC (premature ventricular contraction)     Rash 11/10/2014    Seroma complicating a procedure 10/02/2014    Sleep apnea     cpap    Toe laceration     Type 2 diabetes with nephropathy ([facility])     Uncontrolled type 2 diabetes mellitus 11/10/2015    Vertiginous syndromes and other disorders of vestibular system 11/10/2015     Social History     Socioeconomic History    Marital status:      Spouse name: None    Number of children: None    Years of education: None    Highest education level: None   Tobacco Use    Smoking status: Former     Packs/day: 1.00     Years: 50.00     Pack years: 50.00     Types: Cigarettes     Quit date: 10/2/2011     Years since quittin.3    Smokeless tobacco: Current     Types: Chew    Tobacco comments:     Chews tobacco daily   Vaping Use    Vaping Use: Never used   Substance and Sexual Activity    Alcohol use: Yes     Alcohol/week: 0.0 standard drinks    Drug use: No   Social History Narrative    Lives with wife     Social Determinants of Health     Financial Resource Strain: Low Risk     Difficulty of Paying Living Expenses: Not hard at all   Food Insecurity: No Food Insecurity    Worried About Running Out of Food in the Last Year: Never true    Ran Out of Food in the Last Year: Never true   Transportation Needs: Unknown    Lack of Transportation (Non-Medical): No   Housing Stability: Unknown    Unstable Housing in the Last Year: No     Past Surgical History:   Procedure Laterality Date    CARDIAC CATHETERIZATION  2018    LV EF=40%.LM:nl.LAD:30-40% mid stenosis.LCX OM1:95% stenosis.RCA:100% occlusion at RV branch.    CATARACT REMOVAL Right 2022    CORONARY ANGIOPLASTY WITH STENT PLACEMENT  2018    LCX OM1:2.5 x 12 [place] RAKESH    CORONARY ANGIOPLASTY  WITH STENT PLACEMENT  1999    [place] UNLISTED PROCEDURE ABDOMEN PERITONEUM & OMENTUM  1960    abdominal cyst removed     [address]    stents x3    SPINE SURGERY N/A 07/19/2022    LUMBAR 2, LUMBAR 4 KYPHOPLASTY AND ANY OTHER INDICATED LEVELS performed by Nichole Smith MD at [address] [place] [address]  11/5/14    Repair of right common femoral artery     VASCULAR SURGERY  9/11/14    tiffanie femoral endarterectomy    VASCULAR SURGERY Left 10/28/2022    LEFT LOWER EXTREMITY ARTERIOGRAM / ULTRASOUND GUIDED ACCESS   performed by Shanta Vargas MD at [facility] MAIN OR     Current Outpatient Medications   Medication Sig Dispense Refill    carvedilol (COREG) 6.25 MG tablet Take 1 tablet by mouth 2 times daily (with meals) 60 tablet 1    ferrous sulfate (IRON 325) 325 (65 Fe) MG tablet Take 1 tablet by mouth daily (with breakfast) 30 tablet 3    montelukast (SINGULAIR) 10 MG tablet TAKE 1 TABLET BY MOUTH EVERY DAY AT BEDTIME 90 tablet 3    magnesium oxide (MAG-OX) 400 (240 Mg) MG tablet TAKE 1 TABLET BY MOUTH EVERY DAY 90 tablet 3    albuterol (PROVENTIL) (2.5 MG/3ML) 0.083% nebulizer solution 1 vial via nebulizer 4 times daily if needed for shortness of breath or wheezing. Diagnosis-COPD, J44.9. Bill to Medicare part B 360 mL 11    senna-docusate (PERICOLACE) 8.6-50 MG per tablet Take 1 tablet by mouth nightly      CPAP Machine MISC by Does not apply route      albuterol sulfate HFA (PROVENTIL;VENTOLIN;PROAIR) 108 (90 Base) MCG/ACT inhaler USE 2 PUFFS BY INHALATION 4 TIMES DAILY AS NEEDED FOR WHEEZING OR SHORTNESS OF BREATH. Patient prefers ventolin.  18 g 11    GUAIFENESIN 1200 PO Take 1 tablet by mouth every 12 hours as needed      acetaminophen (TYLENOL) 500 MG tablet Take 500 mg by mouth every 6 hours as needed for Pain Taking 1/2 two times daily      glipiZIDE (GLUCOTROL XL) 10 MG extended release tablet Take 1 tablet by mouth daily 30 tablet 0    furosemide (LASIX) 20 MG tablet Take 1 tablet by mouth in the morning. (Patient taking differently: Take 20 mg by mouth daily 1/2 tablet) 60 tablet 3    rosuvastatin (CRESTOR) 10 MG tablet TAKE 1 TABLET BY MOUTH EVERY DAY (Patient taking differently: at bedtime) 90 tablet 0    fluticasone-salmeterol (ADVAIR) 250-50 MCG/ACT AEPB diskus inhaler Inhale 1 puff into the lungs in the morning and at bedtime      amiodarone (CORDARONE) 200 MG tablet Take 200 mg by mouth daily      apixaban (ELIQUIS) 5 MG TABS tablet Take 5 mg by mouth every 12 hours      clopidogrel (PLAVIX) 75 MG tablet Take 75 mg by mouth daily      fluticasone (FLONASE) 50 MCG/ACT nasal spray USE 1 OR 2 SPRAYS IN EACH NOSTRIL EVERY DAY. latanoprost (XALATAN) 0.005 % ophthalmic solution Apply 1 drop to eye nightly      nitroGLYCERIN (NITROSTAT) 0.4 MG SL tablet Place 0.4 mg under the tongue       No current facility-administered medications for this visit.      Health Maintenance   Topic Date Due    DTaP/Tdap/Td vaccine (1 - Tdap) Never done    Low dose CT lung screening  Never done    Pneumococcal 65+ years Vaccine (3 - PPSV23 if available, else PCV20) 10/17/2016    COVID-19 Vaccine (3 - Booster for Moderna series) 06/10/2021    Diabetic retinal exam  05/18/2022    Flu vaccine (1) 08/01/2022    Diabetic foot exam  09/21/2022    Lipids  03/05/2023    Annual Wellness Visit (AWV)  03/24/2023    Prostate Specific Antigen (PSA) Screening or Monitoring  05/18/2023    A1C test (Diabetic or Prediabetic)  06/29/2023    Depression Screen  11/14/2023    Shingles vaccine  Completed    Hepatitis C screen  Completed    Hepatitis A vaccine  Aged Out    Hib vaccine  Aged Out    Meningococcal (ACWY) vaccine  Aged Out     Family History   Problem Relation Age of Onset    Breast Cancer Mother     Hypertension Mother     Other Father         DIVERTICULITIS    Crohn's Disease Sister     Lung Disease Brother         mesothioloma    Diabetes Sister     Heart Attack Father     Heart Disease Father Stroke Mother              Review of Systems  Review of Systems   Constitutional:  Negative for chills and fever. Respiratory:  Negative for cough, chest tightness, shortness of breath and wheezing. Cardiovascular:  Negative for chest pain, palpitations and leg swelling. Skin:  Positive for wound (on heel). All other systems reviewed and are negative. Temp 97.4 °F (36.3 °C) (Skin)   Ht 6' 1\" (1.854 m)   Wt 238 lb (108 kg)   BMI 31.40 kg/m²   Blood pressure 167/79  Heart rate 60  Temp 97.4  Respiration 17  SPO2 96%  Recheck manual /78    Physical Exam    Physical Exam  Vitals and nursing note reviewed. Constitutional:       General: He is not in acute distress. Appearance: He is not ill-appearing, toxic-appearing or diaphoretic. Neck:      Vascular: No carotid bruit. Cardiovascular:      Rate and Rhythm: Regular rhythm. Heart sounds: No murmur heard. Pulmonary:      Effort: No respiratory distress. Breath sounds: No stridor. Wheezing (scattered, inspiratory) present. No rales. Musculoskeletal:      Right lower leg: No edema. Left lower leg: No edema. Skin:     Coloration: Skin is pale. Neurological:      Mental Status: He is oriented to person, place, and time. Assessment & Plan      Current Outpatient Medications   Medication Sig Dispense Refill    carvedilol (COREG) 6.25 MG tablet Take 1 tablet by mouth 2 times daily (with meals) 60 tablet 1    ferrous sulfate (IRON 325) 325 (65 Fe) MG tablet Take 1 tablet by mouth daily (with breakfast) 30 tablet 3    montelukast (SINGULAIR) 10 MG tablet TAKE 1 TABLET BY MOUTH EVERY DAY AT BEDTIME 90 tablet 3    magnesium oxide (MAG-OX) 400 (240 Mg) MG tablet TAKE 1 TABLET BY MOUTH EVERY DAY 90 tablet 3    albuterol (PROVENTIL) (2.5 MG/3ML) 0.083% nebulizer solution 1 vial via nebulizer 4 times daily if needed for shortness of breath or wheezing. Diagnosis-COPD, J44.9.   Bill to Medicare part B 360 mL 11 senna-docusate (PERICOLACE) 8.6-50 MG per tablet Take 1 tablet by mouth nightly      CPAP Machine MISC by Does not apply route      albuterol sulfate HFA (PROVENTIL;VENTOLIN;PROAIR) 108 (90 Base) MCG/ACT inhaler USE 2 PUFFS BY INHALATION 4 TIMES DAILY AS NEEDED FOR WHEEZING OR SHORTNESS OF BREATH. Patient prefers ventolin. 18 g 11    GUAIFENESIN 1200 PO Take 1 tablet by mouth every 12 hours as needed      acetaminophen (TYLENOL) 500 MG tablet Take 500 mg by mouth every 6 hours as needed for Pain Taking 1/2 two times daily      glipiZIDE (GLUCOTROL XL) 10 MG extended release tablet Take 1 tablet by mouth daily 30 tablet 0    furosemide (LASIX) 20 MG tablet Take 1 tablet by mouth in the morning. (Patient taking differently: Take 20 mg by mouth daily 1/2 tablet) 60 tablet 3    rosuvastatin (CRESTOR) 10 MG tablet TAKE 1 TABLET BY MOUTH EVERY DAY (Patient taking differently: at bedtime) 90 tablet 0    fluticasone-salmeterol (ADVAIR) 250-50 MCG/ACT AEPB diskus inhaler Inhale 1 puff into the lungs in the morning and at bedtime      amiodarone (CORDARONE) 200 MG tablet Take 200 mg by mouth daily      apixaban (ELIQUIS) 5 MG TABS tablet Take 5 mg by mouth every 12 hours      clopidogrel (PLAVIX) 75 MG tablet Take 75 mg by mouth daily      fluticasone (FLONASE) 50 MCG/ACT nasal spray USE 1 OR 2 SPRAYS IN EACH NOSTRIL EVERY DAY. latanoprost (XALATAN) 0.005 % ophthalmic solution Apply 1 drop to eye nightly      nitroGLYCERIN (NITROSTAT) 0.4 MG SL tablet Place 0.4 mg under the tongue       No current facility-administered medications for this visit. 1. Hospital discharge follow-up  Hospital records reviewed, has no further issues. I feel that he would benefit from palliative care. This has been ordered but they have not heard from anybody. Currently he [facility].   His wife will call us if she does not hear from the palliative care team.  We will continue to monitor oxygen saturations and blood pressures at home. Knows to call for any new or worsening symptoms. We discussed using his incentive spirometer with his history of pneumonia and scattered wheezing today as well as using his albuterol nebulizers. Appears well and nontoxic today. 2. History of anemia  Started on iron supplementation daily, is tolerating this well. His hemoglobin was 10 in the hospital.  We will follow and trend. - CBC with Auto Differential; Future  - CBC with Auto Differential    3. History of bacterial pneumonia  He has finished cefdinir, lungs with scattered inspiratory wheezes, no rhonchi or other worrisome findings. - CBC with Auto Differential; Future  - Comprehensive Metabolic Panel; Future  - Comprehensive Metabolic Panel  - CBC with Auto Differential    4. Primary hypertension  Blood pressure is a little higher than I would like to see it today. His beta-blocker was decreased while in the hospital and his valsartan 80 mg was stopped. A prescription was written for a new blood pressure cuff, his wife will begin to monitor. She will call if his blood pressures are trending back up, consider restarting valsartan 80 mg if needed.         Frederic Patrick NP, APRN - CNP

## 2023-02-16 ENCOUNTER — CARE COORDINATION (OUTPATIENT)
Dept: CARE COORDINATION | Facility: CLINIC | Age: 78
End: 2023-02-16

## 2023-02-16 ENCOUNTER — TELEPHONE (OUTPATIENT)
Dept: INTERNAL MEDICINE CLINIC | Facility: CLINIC | Age: 78
End: 2023-02-16

## 2023-02-16 NOTE — TELEPHONE ENCOUNTER
Mr. Rutherford Kuldip,  The labs that we did yesterday are all stable and your hemoglobin is up to 10 on the iron. Please let us know if you develop  any new or worsening sx, continue same medications. Hoping you are doing well!   Janay

## 2023-02-16 NOTE — CARE COORDINATION
[facility] Care Transitions Follow Up Call    Patient Current Location:  Home: [address] Coordinator contacted the patient by telephone to follow up after admission on 2023. Verified name and  with patient as identifiers. Patient: Mini Oliveira  Patient : 1945   MRN: [id number]  Reason for Admission: Acute Respiratory Failure  Discharge Date: 23 RARS: Readmission Risk Score: 31      Needs to be reviewed by the provider   Additional needs identified to be addressed with provider: No  none             Method of communication with provider: none. Spoke with patient states doing fine. Patient denies any concerns during this phone call. Patient states attended wound care on 2023 and PCP on 2/15/2023. Addressed changes since last contact:  none  Discussed follow-up appointments. If no appointment was previously scheduled, appointment scheduling offered: Yes. Is follow up appointment scheduled within 7 days of discharge? Yes. Follow Up  Future Appointments   Date Time Provider Mac Almaraz   2023 11:15 AM Mike Balderrama RN [facility] SFE   2023 11:00 AM Louis River MD [facility]   2023  2:40 PM AARON Fletcher - CNP [facility] AMB   3/6/2023  2:45 PM Janina Hicks MD [facility] AMB   2023  2:15 PM Cayetano Bennett MD [facility] AMB     [facility] follow up appointment(s): n/a    LPN Care Coordinator reviewed medical action plan with patient and discussed any barriers to care and/or understanding of plan of care after discharge. Discussed appropriate site of care based on symptoms and resources available to patient including: PCP  Specialist  When to call 911. The patient agrees to contact the PCP office for questions related to their healthcare. Advance Care Planning:   decision maker updated.      Patients top risk factors for readmission: medical condition-Acute respiratory failure  Interventions to address risk factors: Assessment and support for treatment adherence and medication management-     Offered patient enrollment in the Remote Patient Monitoring (RPM) program for in-home monitoring: NA.     Care Transitions Subsequent and Final Call    Subsequent and Final Calls  Do you have any ongoing symptoms?: No  Have your medications changed?: No  Do you have any questions related to your medications?: No  Do you currently have any active services?: No  Are you currently active with any services?: Home Health  Do you have any needs or concerns that I can assist you with?: No  Identified Barriers: None  Care Transitions Interventions  No Identified Needs  Other Interventions:            Goals Addressed                   This Visit's Progress     Medication Management   On track     I will take my medication as directed. Barriers: none  Plan for overcoming my barriers: Patient's spouse states patient will take medication as directed  Confidence: 9/10  Anticipated Goal Completion Date: within 30 days               LPN Care Coordinator provided contact information for future needs. Plan for follow-up call in 10-14 days based on severity of symptoms and risk factors. Plan for next call: self management-diet, hydration, medication compliance, fall and safety precautions and follow up appointment.     Sancho Azul LPN

## 2023-02-20 ENCOUNTER — HOSPITAL ENCOUNTER (OUTPATIENT)
Dept: WOUND CARE | Age: 78
Discharge: HOME | End: 2023-02-20
Payer: MEDICARE

## 2023-02-20 VITALS
BODY MASS INDEX: 31.54 KG/M2 | HEART RATE: 61 BPM | SYSTOLIC BLOOD PRESSURE: 126 MMHG | HEIGHT: 73 IN | WEIGHT: 238 LBS | DIASTOLIC BLOOD PRESSURE: 67 MMHG

## 2023-02-20 DIAGNOSIS — I73.9 PAD (PERIPHERAL ARTERY DISEASE) (HCC): ICD-10-CM

## 2023-02-20 DIAGNOSIS — E11.42 DIABETIC POLYNEUROPATHY ASSOCIATED WITH TYPE 2 DIABETES MELLITUS (HCC): ICD-10-CM

## 2023-02-20 DIAGNOSIS — L97.422 NON-PRESSURE CHRONIC ULCER OF LEFT HEEL AND MIDFOOT WITH FAT LAYER EXPOSED (HCC): Primary | ICD-10-CM

## 2023-02-20 DIAGNOSIS — Z98.890 H/O ENDARTERECTOMY: ICD-10-CM

## 2023-02-20 DIAGNOSIS — E11.51 DM (DIABETES MELLITUS) TYPE II, CONTROLLED, WITH PERIPHERAL VASCULAR DISORDER (HCC): ICD-10-CM

## 2023-02-20 DIAGNOSIS — R53.81 PHYSICAL DEBILITY: ICD-10-CM

## 2023-02-20 DIAGNOSIS — Z79.02 ANTIPLATELET OR ANTITHROMBOTIC LONG-TERM USE: ICD-10-CM

## 2023-02-20 DIAGNOSIS — I70.90: ICD-10-CM

## 2023-02-20 PROCEDURE — 99213 OFFICE O/P EST LOW 20 MIN: CPT

## 2023-02-20 RX ORDER — LIDOCAINE HYDROCHLORIDE 20 MG/ML
JELLY TOPICAL ONCE
OUTPATIENT
Start: 2023-02-20 | End: 2023-02-20

## 2023-02-20 RX ORDER — LIDOCAINE 50 MG/G
OINTMENT TOPICAL ONCE
OUTPATIENT
Start: 2023-02-20 | End: 2023-02-20

## 2023-02-20 RX ORDER — BACITRACIN ZINC AND POLYMYXIN B SULFATE 500; 1000 [USP'U]/G; [USP'U]/G
OINTMENT TOPICAL ONCE
OUTPATIENT
Start: 2023-02-20 | End: 2023-02-20

## 2023-02-20 RX ORDER — BETAMETHASONE DIPROPIONATE 0.05 %
OINTMENT (GRAM) TOPICAL ONCE
OUTPATIENT
Start: 2023-02-20 | End: 2023-02-20

## 2023-02-20 RX ORDER — GENTAMICIN SULFATE 1 MG/G
OINTMENT TOPICAL ONCE
OUTPATIENT
Start: 2023-02-20 | End: 2023-02-20

## 2023-02-20 RX ORDER — LIDOCAINE HYDROCHLORIDE 40 MG/ML
SOLUTION TOPICAL ONCE
OUTPATIENT
Start: 2023-02-20 | End: 2023-02-20

## 2023-02-20 RX ORDER — BACITRACIN, NEOMYCIN, POLYMYXIN B 400; 3.5; 5 [USP'U]/G; MG/G; [USP'U]/G
OINTMENT TOPICAL ONCE
OUTPATIENT
Start: 2023-02-20 | End: 2023-02-20

## 2023-02-20 RX ORDER — CLOBETASOL PROPIONATE 0.5 MG/G
OINTMENT TOPICAL ONCE
OUTPATIENT
Start: 2023-02-20 | End: 2023-02-20

## 2023-02-20 RX ORDER — GINSENG 100 MG
CAPSULE ORAL ONCE
OUTPATIENT
Start: 2023-02-20 | End: 2023-02-20

## 2023-02-20 RX ORDER — LIDOCAINE 40 MG/G
CREAM TOPICAL ONCE
OUTPATIENT
Start: 2023-02-20 | End: 2023-02-20

## 2023-02-21 NOTE — PROGRESS NOTES
Physician Progress Note      PATIENT:               Tono Rony  [facility] #:                  [id number]  :                       1945  ADMIT DATE:       2023 10:09 PM  Nickolas Wallace DATE:        2023 1:10 AM  RESPONDING  PROVIDER #:        Albert Valdes MD          QUERY TEXT:    Pt admitted with acute reparatory failure. Pt noted to have  PNA, T 102.2, RR   23, 22 . If possible, please document in the progress notes and discharge   summary if you are evaluating and /or treating any of the following: The medical record reflects the following:  Risk Factors: 77 YOF, PNA, acute respiratory failure, Metabolic   Encephalopathy, DM2  Clinical Indicators: 102.2 80 18 126/64 81% BMI 31.27 , with O2 sats of 81% on   RA and RR 23, 22. Treatment: Monitoring, Vancomycin IV, Doxycycline IV, Rocephin IV, Maxipime   IV, NS IV 1000cc Bolus, IVFs,    Thank you,  Daved Hammans RN,C BSN  Clinical   [email or web address]  Options provided:  -- Sepsis, present on admission 2/2 to PNA  -- PNA without Sepsis  -- Sepsis was ruled out  -- Other - I will add my own diagnosis  -- Disagree - Not applicable / Not valid  -- Disagree - Clinically unable to determine / Unknown  -- Refer to Clinical Documentation Reviewer    PROVIDER RESPONSE TEXT:    This patient has sepsis 2/2 to PNA which was present on admission. Query created by:  Mae Pepe on 2023 8:58 AM      Electronically signed by:  Albert Valdes MD 2023 7:18 AM

## 2023-02-22 RX ORDER — FLUTICASONE PROPIONATE 50 MCG
SPRAY, SUSPENSION (ML) NASAL
Qty: 3 EACH | Refills: 3 | Status: SHIPPED | OUTPATIENT
Start: 2023-02-22

## 2023-02-24 NOTE — PROGRESS NOTES
He is a 71-year-old male seen for hospital follow-up, admitted 2/6/2023 with acute respiratory failure with hypoxia, hospital-acquired pneumonia of RLL. No prior hospitalization 12/29/2022 with COPD exacerbation, influenza, bacteremia, acute hypoxic respiratory failure. He was seen by ID. DIAGNOSTICS:        Spirometry 5/2013: FEV1 1.70 Final FEV1 % Pred 46 % Final FVC 2.55 Final FVC % Pred 52 % Final FEV1/F; Significant  interval decline in FVC and FEV (previously 67% and 58% of predicted, or 3.25 and 2.16 L respectively). Chest CT 10/2014 - Negative for pulmonary embolus; basilar atelectasis. Stable left adrenal mass. Spirometry:  10/12/2015 - Moderate restrictive defect, significant interval improvement in FVC  and FEV1. Spirometry 4/11/0216 - moderate restrictive and obstructive defects, no significant interval change. CXR 10/2016-linear density in both lung bases. Could reflect some atelectasis and/or scarring  Spirometry 11/17/2016 - moderate restrictive defect. Spirometry 2/14/2018-moderate obstructive and restrictive defects, no significant interval change. CXR 2/8/2019-suboptimal exposure Technique. Atelectasis or consolidation left lower lobe. Spirometry 2/20/2019 interval decline in FVC, no significant change in FEV1  CXR 4/9/2019-clearing of left lower lobe infiltrate, no acute process. Spirometry 10/7/2019-moderately severe restrictive defect, no significant change. Ambulatory oximetry 3/1/2021-baseline saturation 96% room air at rest, 86 room air with ambulation. 92% on 2 lpm w/ exertion. CXR 5/11/2021-overall improvement in bilateral airspace opacities, mild bibasilar atelectasis/infiltrates present. CXR 9/1/2021-clear lungs, no acute cardiopulmonary process. Spirometry 3/1/2022-severe obstructive defect with decreased FVC, interval improvement in FVC.   Chest CT 3/9/2022-multilobar atypical infectious or inflammatory bronchiolitis primarily affecting the small airways involving the bilateral upper lobes and left lower lobe. PCXR 7/27/2022-pulmonary infiltrate in RUL and mid to lower left lung Has mostly cleared. Atelectasis or scarring right lung base appears stable. Stable cardiomegaly. ECHO 7/30/2022-EF 40-45%. Normal diastolic function. RVSP estimated 27 mmHg. Mild to moderate TR, mild MR. CXR 8/12/2022-improved bibasilar opacities with mild residual remaining on the right. CXR 8/30/2022-early pulmonary edema favored. CXR 9/1/2022-stable cardiomegaly. No airspace consolidation. Mild prominence of interstitial markings which is unchanged. CXR 12/29/2022-mild cardiomegaly, improved from prior study. ECHO 12/29/2022-EF 55-60%, mild LVH, mild MR.  CXR 2/6/2023-RLL. Recommend follow-up chest x-ray in 8 weeks.

## 2023-02-27 ENCOUNTER — HOSPITAL ENCOUNTER (OUTPATIENT)
Dept: WOUND CARE | Age: 78
Discharge: HOME | End: 2023-02-27
Payer: MEDICARE

## 2023-02-27 ENCOUNTER — OFFICE VISIT (OUTPATIENT)
Dept: PULMONOLOGY | Age: 78
End: 2023-02-27
Payer: MEDICARE

## 2023-02-27 VITALS
DIASTOLIC BLOOD PRESSURE: 72 MMHG | OXYGEN SATURATION: 98 % | BODY MASS INDEX: 31.41 KG/M2 | HEART RATE: 60 BPM | SYSTOLIC BLOOD PRESSURE: 118 MMHG | WEIGHT: 237 LBS | TEMPERATURE: 96.6 F | HEIGHT: 73 IN

## 2023-02-27 VITALS
HEIGHT: 73 IN | BODY MASS INDEX: 31.54 KG/M2 | OXYGEN SATURATION: 96 % | WEIGHT: 238 LBS | SYSTOLIC BLOOD PRESSURE: 131 MMHG | DIASTOLIC BLOOD PRESSURE: 69 MMHG | RESPIRATION RATE: 18 BRPM | HEART RATE: 61 BPM | TEMPERATURE: 97.8 F

## 2023-02-27 DIAGNOSIS — G47.33 OBSTRUCTIVE SLEEP APNEA: ICD-10-CM

## 2023-02-27 DIAGNOSIS — R53.81 PHYSICAL DEBILITY: ICD-10-CM

## 2023-02-27 DIAGNOSIS — F17.211 CIGARETTE NICOTINE DEPENDENCE IN REMISSION: ICD-10-CM

## 2023-02-27 DIAGNOSIS — R09.89 RHONCHI AT LEFT LUNG BASE: ICD-10-CM

## 2023-02-27 DIAGNOSIS — Z79.02 ANTIPLATELET OR ANTITHROMBOTIC LONG-TERM USE: ICD-10-CM

## 2023-02-27 DIAGNOSIS — I70.90: ICD-10-CM

## 2023-02-27 DIAGNOSIS — E11.51 DM (DIABETES MELLITUS) TYPE II, CONTROLLED, WITH PERIPHERAL VASCULAR DISORDER (HCC): ICD-10-CM

## 2023-02-27 DIAGNOSIS — L97.422 NON-PRESSURE CHRONIC ULCER OF LEFT HEEL AND MIDFOOT WITH FAT LAYER EXPOSED (HCC): Primary | ICD-10-CM

## 2023-02-27 DIAGNOSIS — J44.1 COPD EXACERBATION (HCC): ICD-10-CM

## 2023-02-27 DIAGNOSIS — R06.89 DECREASED BREATH SOUNDS: ICD-10-CM

## 2023-02-27 DIAGNOSIS — I73.9 PAD (PERIPHERAL ARTERY DISEASE) (HCC): ICD-10-CM

## 2023-02-27 DIAGNOSIS — Z77.090 CONTACT WITH AND (SUSPECTED) EXPOSURE TO ASBESTOS: ICD-10-CM

## 2023-02-27 DIAGNOSIS — J15.9 HOSPITAL-ACQUIRED BACTERIAL PNEUMONIA: ICD-10-CM

## 2023-02-27 DIAGNOSIS — I70.213 ATHEROSCLEROSIS OF NATIVE ARTERY OF BOTH LOWER EXTREMITIES WITH INTERMITTENT CLAUDICATION (HCC): ICD-10-CM

## 2023-02-27 DIAGNOSIS — E11.42 DIABETIC POLYNEUROPATHY ASSOCIATED WITH TYPE 2 DIABETES MELLITUS (HCC): ICD-10-CM

## 2023-02-27 DIAGNOSIS — J18.9 PNEUMONIA OF RIGHT LOWER LOBE DUE TO INFECTIOUS ORGANISM: Primary | ICD-10-CM

## 2023-02-27 PROCEDURE — 1111F DSCHRG MED/CURRENT MED MERGE: CPT | Performed by: NURSE PRACTITIONER

## 2023-02-27 PROCEDURE — G8484 FLU IMMUNIZE NO ADMIN: HCPCS | Performed by: NURSE PRACTITIONER

## 2023-02-27 PROCEDURE — 1123F ACP DISCUSS/DSCN MKR DOCD: CPT | Performed by: NURSE PRACTITIONER

## 2023-02-27 PROCEDURE — 3078F DIAST BP <80 MM HG: CPT | Performed by: NURSE PRACTITIONER

## 2023-02-27 PROCEDURE — 15271 SKIN SUB GRAFT TRNK/ARM/LEG: CPT

## 2023-02-27 PROCEDURE — G8427 DOCREV CUR MEDS BY ELIG CLIN: HCPCS | Performed by: NURSE PRACTITIONER

## 2023-02-27 PROCEDURE — 3074F SYST BP LT 130 MM HG: CPT | Performed by: NURSE PRACTITIONER

## 2023-02-27 PROCEDURE — 15004 WOUND PREP F/N/HF/G: CPT | Performed by: SURGERY

## 2023-02-27 PROCEDURE — 15275 SKIN SUB GRAFT FACE/NK/HF/G: CPT | Performed by: SURGERY

## 2023-02-27 PROCEDURE — 3023F SPIROM DOC REV: CPT | Performed by: NURSE PRACTITIONER

## 2023-02-27 PROCEDURE — 4004F PT TOBACCO SCREEN RCVD TLK: CPT | Performed by: NURSE PRACTITIONER

## 2023-02-27 PROCEDURE — 99214 OFFICE O/P EST MOD 30 MIN: CPT | Performed by: NURSE PRACTITIONER

## 2023-02-27 PROCEDURE — G8417 CALC BMI ABV UP PARAM F/U: HCPCS | Performed by: NURSE PRACTITIONER

## 2023-02-27 RX ORDER — FLUTICASONE PROPIONATE AND SALMETEROL 250; 50 UG/1; UG/1
POWDER RESPIRATORY (INHALATION)
Qty: 60 EACH | Refills: 11 | Status: SHIPPED | OUTPATIENT
Start: 2023-02-27

## 2023-02-27 ASSESSMENT — ENCOUNTER SYMPTOMS
SPUTUM PRODUCTION: 1
WHEEZING: 0
SHORTNESS OF BREATH: 1
COUGH: 1

## 2023-02-27 NOTE — FLOWSHEET NOTE
02/27/23 1107   Wound 09/09/22 Heel Left;Lateral   Date First Assessed/Time First Assessed: 09/09/22 1535   Present on Hospital Admission: Yes  Primary Wound Type: Diabetic Ulcer  Location: Heel  Wound Location Orientation: Left;Lateral   Wound Image    Wound Etiology Diabetic Martinez 2   Dressing Status Old drainage noted   Wound Cleansed Cleansed with saline   Dressing/Treatment   (Puraply, bordered foam)   Offloading for Diabetic Foot Ulcers Offloading ordered   Wound Length (cm) 0.7 cm   Wound Width (cm) 0.9 cm   Wound Depth (cm) 0.2 cm   Wound Surface Area (cm^2) 0.63 cm^2   Change in Wound Size % (l*w) 89.5   Wound Volume (cm^3) 0.126 cm^3   Wound Healing % 95   Wound Assessment Pink/red   Drainage Amount Small   Drainage Description Serosanguinous   Odor None   Joanna-wound Assessment Intact   Wound Thickness Description not for Pressure Injury Full thickness   Pain Assessment   Pain Assessment None - Denies Pain   Patient is currently taking Plavix and Eliquis

## 2023-02-27 NOTE — PROGRESS NOTES
[address] [facility]. [address], [address] [facility]  [phone]    Patient Name:  Elsa Morgan    YOB: 1945    Office Visit 2/27/2023      CHIEF COMPLAINT:      Chief Complaint   Patient presents with    Follow-Up from Hospital         ASSESSMENT:   (Medical Decision Making)                                                                                                                                          Encounter Diagnoses   Name Primary? Pneumonia of right lower lobe due to infectious organism Yes    COPD exacerbation ([address]     Hospital-acquired bacterial pneumonia     Obstructive sleep apnea     Rhonchi at left lung base     Decreased breath sounds     Contact with and (suspected) exposure to asbestos     Cigarette nicotine dependence in remission      Significant improvement symptomatically, follow-up imaging recommended 6 weeks from 2/2023 study in ER. Severe COPD, Exacerbation has resolved. Suboptimal compliance with Advair dosing, using once daily. Reviewed duration of advair dosing. Has decreased breath sounds and rhonchi left lung base, recommend increase bronchodilators use and as needed mucolytic. He is compliant with CPAP. PLAN:     Use albuterol via nebulizer or inhaler, 3-4 times daily for now. To aid with mucous clearance    Increase Advair to prescribed dose of 1 inhalation twice daily, time dose to follow albuterol in the morning and evening. Rinse mouth after use. OTC mucolytic, (mucinex or generic equivalent of guaifenesin), 2400mg in 24 hours in divided doses as needed to thin mucous. Follow-up in 6 weeks with chest x-ray to assess for clearing of right lower lobe pneumonia. Continue CPAP as prescribed. Follow-up and Dispositions    Return in about 6 weeks (around 4/10/2023) for MD or Ang Heck, COPD, f/u RLL pneumonia w/ CXR, PFT's.            Orders Placed This Encounter   Procedures    XR CHEST PA LAT (2 VIEWS)     Standing Status:   Future     Standing Expiration Date:   2024     Order Specific Question:   Reason for exam:     Answer:   f/u RLL pneumonia         Orders Placed This Encounter   Medications    fluticasone-salmeterol (ADVAIR) 250-50 MCG/ACT AEPB diskus inhaler     Si inhalation twice daily, rinse mouth after use     Dispense:  60 each     Refill:  [address] [facility] [address] [facility], APRN - CNP    Total  time spent with patient - 43 min. Collaborating MD: Dr. Gordillo Pouch:    He is a 58-year-old male seen for hospital follow-up, admitted 2023 with acute respiratory failure with hypoxia, hospital-acquired pneumonia of RLL. Note prior hospitalization 2022 with COPD exacerbation, influenza, bacteremia, acute hypoxic respiratory failure. He was seen by ID. Today, he reports interval improvement since time of hospital discharge. Shortness of breath has decreased. He denies definite wheezing, although wife reports that she has noticed some wheeze. Has intermittent cough, occasionally thick, nonpurulent sputum. No hemoptysis, fever or chills. No episodes of altered mental status. He reports that he is taking Advair once daily. Uses albuterol inhaler or nebulizer intermittently with good response. He reports compliance with CPAP therapy. DIAGNOSTICS:        Spirometry 2013: FEV1 1.70 Final FEV1 % Pred 46 % Final FVC 2.55 Final FVC % Pred 52 % Final FEV1/F; Significant  interval decline in FVC and FEV (previously 67% and 58% of predicted, or 3.25 and 2.16 L respectively). Chest CT 10/2014 - Negative for pulmonary embolus; basilar atelectasis. Stable left adrenal mass. Spirometry:  10/12/2015 - Moderate restrictive defect, significant interval improvement in FVC  and FEV1. Spirometry 216 - moderate restrictive and obstructive defects, no significant interval change. CXR 10/2016-linear density in both lung bases. Could reflect some atelectasis and/or scarring  Spirometry 11/17/2016 - moderate restrictive defect. Spirometry 2/14/2018-moderate obstructive and restrictive defects, no significant interval change. CXR 2/8/2019-suboptimal exposure Technique. Atelectasis or consolidation left lower lobe. Spirometry 2/20/2019 interval decline in FVC, no significant change in FEV1  CXR 4/9/2019-clearing of left lower lobe infiltrate, no acute process. Spirometry 10/7/2019-moderately severe restrictive defect, no significant change. Ambulatory oximetry 3/1/2021-baseline saturation 96% room air at rest, 86 room air with ambulation. 92% on 2 lpm w/ exertion. CXR 5/11/2021-overall improvement in bilateral airspace opacities, mild bibasilar atelectasis/infiltrates present. CXR 9/1/2021-clear lungs, no acute cardiopulmonary process. Spirometry 3/1/2022-severe obstructive defect with decreased FVC, interval improvement in FVC. Chest CT 3/9/2022-multilobar atypical infectious or inflammatory bronchiolitis primarily affecting the small airways involving the bilateral upper lobes and left lower lobe. PCXR 7/27/2022-pulmonary infiltrate in RUL and mid to lower left lung Has mostly cleared. Atelectasis or scarring right lung base appears stable. Stable cardiomegaly. ECHO 7/30/2022-EF 40-45%. Normal diastolic function. RVSP estimated 27 mmHg. Mild to moderate TR, mild MR. CXR 8/12/2022-improved bibasilar opacities with mild residual remaining on the right. CXR 8/30/2022-early pulmonary edema favored. CXR 9/1/2022-stable cardiomegaly. No airspace consolidation. Mild prominence of interstitial markings which is unchanged. CXR 12/29/2022-mild cardiomegaly, improved from prior study. ECHO 12/29/2022-EF 55-60%, mild LVH, mild MR.  CXR 2/6/2023-moderate opacity RLL. Recommend follow-up chest x-ray in 8 weeks.     _____________________________________________________________      REVIEW OF SYSTEMS:    Review of Systems Constitutional: Positive for malaise/fatigue. Negative for chills and fever. Respiratory:  Positive for cough, shortness of breath and sputum production. Negative for wheezing. Psychiatric/Behavioral:  Negative for altered mental status. PHYSICAL EXAM:    Vitals:    02/27/23 1430   BP: 118/72   Pulse: 60   Temp: (!) 96.6 °F (35.9 °C)   TempSrc: Skin   SpO2: 98%   Weight: 237 lb (107.5 kg)   Height: 6' 1\" (1.854 m)        Body mass index is 31.27 kg/m². GENERAL APPEARANCE:  The patient is obese and in no respiratory distress. HEENT:  PERRL. Conjunctivae unremarkable. NECK/LYMPHATIC:   Symmetrical with no elevation of jugular venous pulsation. Trachea midline. LUNGS:   Normal respiratory effort with symmetrical lung expansion. Breath sounds - decreased LLL and rhonchi;  very faint end expiratory wheezing, no crackles. HEART:   There is a normal rate and regular rhythm. No murmur, rub, or gallop. There is no edema in the lower extremities. NEURO:  The patient is alert and oriented to person, place, and time. Memory appears intact and mood is normal.  No gross sensorimotor deficits are present. DIAGNOSTIC TESTS: Studies were personally reviewed by me and discussed with the patient. CXR:      XR CHEST (2 VW) 09/02/2022      Findings: 2 views of the chest submitted demonstrate stable cardiomegaly with an  AICD in place. No airspace consolidation evident. There is mild prominence of  interstitial markings which is unchanged. There is no pleural effusion. There is  no pneumothorax. Impression  No significant interval change. CT WITH CONTRAST:    CT CHEST W CONTRAST 03/09/2022      COMPARISON:Chest CT dated 10/23/2014    FINDINGS:    No mediastinal or axillary adenopathy. There is no pleural or pericardial  effusion. There is no pneumothorax.  Central patchy groundglass opacities noted  in the right upper lobe with a more small airways type groundglass nodular  appearance in the left lower lobe and lingula. Similar findings are noted to a  lesser extent in the left lower lobe. No dense airspace consolidation evident. There is no pleural effusion. Limited evaluation of the upper abdomen is unremarkable. Impression  1. Multilobar atypical infectious or inflammatory bronchiolitis primarily  affecting the small airways involving the bilateral upper lobes and left lower  lobe.       Past Medical History:   Diagnosis Date    Acute respiratory failure with hypoxia ([address] 10/23/2014    MINI (acute kidney injury) ([address] 09/15/2014    MINI (acute kidney injury) ([address]     MINI (acute kidney injury) ([address]     Allergic rhinitis 11/10/2015    Asthma 11/10/2015    Benign essential hypertension 11/10/2015    Benign neoplasm of colon 11/10/2015    CAD (coronary artery disease) 11/10/2015    CAD (coronary artery disease) 1998, 1999    mix2, 3 stents [id number]    CAP (community acquired pneumonia) 12/31/2020    Cardiomyopathy ([address] 20/41/4525    Complicated wound infection 11/10/2015    COPD (chronic obstructive pulmonary disease) with emphysema ([address] 11/10/2015    COPD exacerbation ([address] 10/12/2011    COVID-19 12/31/2020    Diabetes mellitus type 2, controlled ([address] 11/10/2015    doesn/t check daily oral meds, A1c 6.0 (8/10/22)    Diabetic neuropathy ([address] 11/10/2015    Disruption of wound, unspecified 10/09/2014    Encounter for long-term (current) use of other high-risk medications 11/10/2015    Extremity atherosclerosis with intermittent claudication ([address] 11/10/2015    H/O endarterectomy 11/10/2015    Hiatal hernia 11/10/2015    History of 2019 novel coronavirus disease (COVID-19)     12/31/2020- hospitalized    Hyperglycemia due to type 1 diabetes mellitus ([address] 11/10/2015    Hyperlipidemia 11/10/2015    ICD (implantable cardioverter-defibrillator) in place 06/16/2022    Monomorphic ventricular tachycardia 12/31/2020    Myocardial infarction [facility]) 1999    Myocardial infarction ([facility]) 11/10/2015    Obesity 11/10/2015    Orthostatic hypotension 11/10/2015    Osteoarthritis 11/10/2015    Peripheral vascular disease (HCC) 11/10/2015    PNA (pneumonia) 02/08/2019    PVC (premature ventricular contraction)     Rash 11/10/2014    Seroma complicating a procedure 10/02/2014    Sleep apnea     cpap    Toe laceration     Type 2 diabetes with nephropathy ([facility])     Uncontrolled type 2 diabetes mellitus 11/10/2015    Vertiginous syndromes and other disorders of vestibular system 11/10/2015       Patient Active Problem List   Diagnosis    H/O endarterectomy    Arterial degeneration    Atrial fibrillation ([facility])    Complicated wound infection    Allergic rhinitis    Elevated troponin    HTN (hypertension)    Pulmonary emphysema ([facility])    Atherosclerosis of native arteries of extremity with intermittent claudication ([facility])    Severe obesity (BMI 35.0-39.9) with comorbidity ([facility])    COPD (chronic obstructive pulmonary disease) ([facility])    Personal history of tobacco use, presenting hazards to health    Intermittent spinal claudication ([facility])    Cigarette nicotine dependence in remission    Irregular heart beat    Acute metabolic encephalopathy    Chest pain    Hiatal hernia    Diabetic neuropathy ([facility])    Normocytic anemia    Chronic pain of right knee    Sleep apnea    Osteoarthritis    Encounter for long-term (current) drug use    Ischemic cardiomyopathy    Ventricular tachycardia    VT (ventricular tachycardia)    Coronary artery disease involving native coronary artery of native heart without angina pectoris    Benign neoplasm of colon    PAD (peripheral artery disease) ([facility])    Asthma    Orthostatic hypotension    Hyperlipidemia    S/P angioplasty with stent    DM (diabetes mellitus) type II, controlled, with peripheral vascular disorder ([facility])    Toe laceration    Obstructive sleep apnea    MINI (acute kidney injury) ([facility])    Type 2 diabetes with nephropathy ([facility])    Hypoxia    Abnormal nuclear cardiac imaging  test    PVC's (premature ventricular contractions)    Asbestos exposure    Sepsis ([address]    ICD (implantable cardioverter-defibrillator) in place    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, vertebra(e), init ([address]    General weakness    Acute cystitis without hematuria    Low back pain without sciatica    Back pain    DNI (do not intubate)    Elevated liver enzymes    Anemia    Hyponatremia    Physical debility    Lumbar discitis    Psoas muscle abscess (HCC)    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 LM:nl. LAD:30-40% mid stenosis. LCX OM1:95% stenosis. RCA:100% occlusion at RV branch.     CATARACT REMOVAL Right 07/20/2022    CORONARY ANGIOPLASTY WITH STENT PLACEMENT  12/05/2018    LCX OM1:2.5 x 12 [place] RAKESH    CORONARY ANGIOPLASTY WITH STENT PLACEMENT  1999    SD UNLISTED PROCEDURE ABDOMEN PERITONEUM & OMENTUM  1960    abdominal cyst removed     [address]    stents x3    SPINE SURGERY N/A 07/19/2022    LUMBAR 2, LUMBAR 4 KYPHOPLASTY AND ANY OTHER INDICATED LEVELS performed by Salo Martinez III, MD at [facility] MAIN OR    VASCULAR SURGERY  11/5/14    Repair of right common femoral artery     VASCULAR SURGERY  9/11/14    tiffanie femoral endarterectomy    VASCULAR SURGERY Left 10/28/2022    LEFT LOWER EXTREMITY ARTERIOGRAM / ULTRASOUND GUIDED ACCESS   performed by Mark Anthony Jackson MD at [facility] MAIN OR         Social History     Socioeconomic History    Marital status:      Spouse name: None    Number of children: None    Years of education: None    Highest education level: None   Tobacco Use    Smoking status: Former     Packs/day: 1.00     Years: 50.00     Pack years: 50.00     Types: Cigarettes     Quit date: 10/2/2011     Years since quittin.4    Smokeless tobacco: Current     Types: Chew    Tobacco comments:     Chews tobacco daily   Vaping Use    Vaping Use: Never used   Substance and Sexual Activity    Alcohol use: Yes     Alcohol/week: 0.0 standard drinks    Drug use: No   Social History Narrative    Lives with wife     Social Determinants of Health     Financial Resource Strain: Low Risk     Difficulty of Paying Living Expenses: Not hard at all   Food Insecurity: No Food Insecurity    Worried About Running Out of Food in the Last Year: Never true    Ran Out of Food in the Last Year: Never true   Transportation Needs: Unknown    Lack of Transportation (Non-Medical): No   Housing Stability: Unknown    Unstable Housing in the Last Year: No       Family History   Problem Relation Age of Onset    Breast Cancer Mother     Hypertension Mother     Other Father         DIVERTICULITIS    Crohn's Disease Sister     Lung Disease Brother         mesothioloma    Diabetes Sister     Heart Attack Father     Heart Disease Father     Stroke Mother        Allergies   Allergen Reactions    Acetaminophen Hives     Per spouse has small amount of hives, takes 1/2 and tolerates     Azithromycin Hives    Morphine Hallucinations     Muscle twitching. jerking    Oxaprozin Other (See Comments)     ELEVATED BLOOD PRESSURE    Oxycodone Anxiety       Current Outpatient Medications   Medication Sig    fluticasone (FLONASE) 50 MCG/ACT nasal spray USE 1 OR 2 SPRAYS IN EACH NOSTRIL EVERY DAY.     carvedilol (COREG) 6.25 MG tablet Take 1 tablet by mouth 2 times daily (with meals)    ferrous sulfate (IRON 325) 325 (65 Fe) MG tablet Take 1 tablet by mouth daily (with breakfast)    montelukast (SINGULAIR) 10 MG tablet TAKE 1 TABLET BY MOUTH EVERY DAY AT BEDTIME    magnesium oxide (MAG-OX) 400 (240 Mg) MG tablet TAKE 1 TABLET BY MOUTH EVERY DAY    albuterol (PROVENTIL) (2.5 MG/3ML) 0.083% nebulizer solution 1 vial via nebulizer 4 times daily if needed for shortness of breath or wheezing. Diagnosis-COPD, J44.9. Bill to Medicare part B    senna-docusate (Leah Labor) 8.6-50 MG per tablet Take 1 tablet by mouth nightly    CPAP Machine MISC by Does not apply route    albuterol sulfate HFA (PROVENTIL;VENTOLIN;PROAIR) 108 (90 Base) MCG/ACT inhaler USE 2 PUFFS BY INHALATION 4 TIMES DAILY AS NEEDED FOR WHEEZING OR SHORTNESS OF BREATH. Patient prefers ventolin. GUAIFENESIN 1200 PO Take 1 tablet by mouth every 12 hours as needed    acetaminophen (TYLENOL) 500 MG tablet Take 500 mg by mouth every 6 hours as needed for Pain Taking 1/2 two times daily    glipiZIDE (GLUCOTROL XL) 10 MG extended release tablet Take 1 tablet by mouth daily    furosemide (LASIX) 20 MG tablet Take 1 tablet by mouth in the morning. (Patient taking differently: Take 20 mg by mouth daily 1/2 tablet)    rosuvastatin (CRESTOR) 10 MG tablet TAKE 1 TABLET BY MOUTH EVERY DAY (Patient taking differently: at bedtime)    fluticasone-salmeterol (ADVAIR) 250-50 MCG/ACT AEPB diskus inhaler Inhale 1 puff into the lungs in the morning and at bedtime    amiodarone (CORDARONE) 200 MG tablet Take 200 mg by mouth daily    apixaban (ELIQUIS) 5 MG TABS tablet Take 5 mg by mouth every 12 hours    clopidogrel (PLAVIX) 75 MG tablet Take 75 mg by mouth daily    latanoprost (XALATAN) 0.005 % ophthalmic solution Apply 1 drop to eye nightly    nitroGLYCERIN (NITROSTAT) 0.4 MG SL tablet Place 0.4 mg under the tongue     No current facility-administered medications for this visit. Over 50% of today's office visit was spent in face to face time reviewing test results, prognosis, importance of compliance, education about disease process, benefits of medications, instructions for management of acute symptoms, and follow up plans. Electronically signed. Dictated using voice recognition software.   Proof read but unrecognized errors may exist.

## 2023-02-27 NOTE — PROGRESS NOTES
[address]  Consult Note/H&P/Progress Note      [address] RECORD NUMBER:  [id number]  AGE: 65 y.o. RACE White (non-)  GENDER: male  : 1945  EPISODE DATE:  2023       Subjective:      CC (Chief Complaint) and HISTORY of PRESENT ILLNESS (HPI):    Becky Machado is a 65 y.o. male who presents today for wound/ulcer evaluation. He presents on 10/24/2022 with an ulcer wound on the left heel that has been present for several months. He was in his usual state of poor health when in July he had back surgery which led to a complicated abscess, prolonged hospital stay, and rehabilitation stay which ended just a few weeks ago. He is currently at home. He has significant debility. He is diabetic and has significant peripheral vascular disease with history of bilateral femoral endarterectomies. These were performed several years ago by Dr. Mona Perez. ABIs were performed in July just prior to his surgery with the JOVANA on the left of 0.6 with plan for follow-up this coming January to monitor. He has been lying in bed without use of Rooke boots and has developed an ulcer on the left heel. He has a history of MRSA infection in his back and was placed on a course of doxycycline. There is no evidence of active cellulitis today. He has had no imaging. He has not had repeat vascular evaluation. There is some description of pain in his leg at rest.  He is not active enough to discern any possible claudication. He is anticoagulated on both Eliquis and Plavix. There is no current dressing care plan. He returns on 10/31/2022. We are very fortunate that he was evaluated by vascular surgery who performed an arteriogram showing significant arterial disease. Unfortunately, his disease is not amenable to percutaneous intervention and will require a bypass in the coming weeks.   Debridement was not aggressively performed today but some loose tissue was selectively debrided and well-tolerated. He has been participating with home therapy including ambulation with a walker. Current dressing is Betadine. He returns on 11/10/2022. He is doing better with no evidence of cellulitis. He has completed antibiotics. The ulcer looks improved with current dressing of Betadine. He sees vascular on Monday to schedule bypass. He returns on 12/1/2022. He is actually improving with the heel ulcer showing improvement with just Betadine dressings. I believe he is offloading better than previously. I am not sure that the wife grasps the offloading and heel offloading concept. Vascular surgery has him set up for Femoral to distal bypass in January, but they feel he may heal without it. He was seen by ID who did not recommend further antibiotics for his foot or for his discitis. They recommend against the bypass if he continues to heal as well. He also developed issues with his eye with an acute glaucoma attack. He is being treated with nonsurgical methods at present but may need cataract surgery. He returns on 12/8/2022. There is some wound improvement. We performed skin substitute grafting with PuraPly AM #1 today which was well-tolerated. Offloading and protecting from pressure was again stressed. He returns on 12/15/2022. There is improvement and better offloading. PuraPly AM #2 was applied today and well-tolerated. He returns today on 1/19/2023. He was recently hospitalized with bacteremia of unknown source. The source was not his foot. He missed his last appointment due to this. He is doing better and the wound actually is improved probably from better offloading. PuraPly AM #3 was applied today and well tolerated. He returns on 1/26/2023. He remains well. The wound is improving and PuraPly AM #4 was applied today and well-tolerated. He returns on 2/2/2023. No medical issues.   The wound continues to improve and PuraPly AM #5 was applied today and well-tolerated. He returns on 2/13/2023. He was just released from hospital admission for pneumonia with hypoxia. He is doing better now with increased ambulation. There is possibly some slight deterioration of the wound that may be related to his hospitalization. Is still remains small and PuraPly AM #6 was applied today and well-tolerated. He returns on 2/27/2023. He had dressing changes since his last visit and grafting. There is improvement in the wound with epithelization coming from the edges. NuShield #1 was applied today and well-tolerated.     This information was obtained from the patient  The following HPI elements were documented for the patient's wound:  Location: Left heel  Duration: August/September 2022  Severity: Fat layer exposed  Context: Limited activity, much lying in bed, PVD, DM, no offloading  Modifying Factors:  Nothing makes better or worse  Quality: Full-thickness, painful  Timing: Constant  Associated Signs and Symptoms: Tissue loss and possible bout of cellulitis      Ulcer Identification:  Ulcer Type: Diabetic foot ulcer left heel with fat layer exposed  Contributing Factors: Inadequate offloading, PVD, anticoagulated    PuraPlyAM: #1 (12/8/2022), #2 (12/15), #3 (1/19/2023), #4 (1/26), #5 (2/2), #6 (2/13)  NuShield: #1 (2/27/2023)        PAST MEDICAL HISTORY  Past Medical History:   Diagnosis Date    Acute respiratory failure with hypoxia ([address] 10/23/2014    MINI (acute kidney injury) ([address] 09/15/2014    MINI (acute kidney injury) ([address]     MINI (acute kidney injury) ([address]     Allergic rhinitis 11/10/2015    Asthma 11/10/2015    Benign essential hypertension 11/10/2015    Benign neoplasm of colon 11/10/2015    CAD (coronary artery disease) 11/10/2015    CAD (coronary artery disease) 1998, 1999    mix2, 3 stents [id number]    CAP (community acquired pneumonia) 12/31/2020    Cardiomyopathy ([address] 31/94/1134    Complicated wound infection 11/10/2015    COPD (chronic obstructive pulmonary disease) with emphysema ([address] 11/10/2015    COPD exacerbation ([address] 10/12/2011    COVID-19 12/31/2020    Diabetes mellitus type 2, controlled ([address] 11/10/2015    doesn/t check daily oral meds, A1c 6.0 (8/10/22)    Diabetic neuropathy ([address] 11/10/2015    Disruption of wound, unspecified 10/09/2014    Encounter for long-term (current) use of other high-risk medications 11/10/2015    Extremity atherosclerosis with intermittent claudication ([address] 11/10/2015    H/O endarterectomy 11/10/2015    Hiatal hernia 11/10/2015    History of 2019 novel coronavirus disease (COVID-19)     12/31/2020- hospitalized    Hyperglycemia due to type 1 diabetes mellitus ([address] 11/10/2015    Hyperlipidemia 11/10/2015    ICD (implantable cardioverter-defibrillator) in place 06/16/2022    Monomorphic ventricular tachycardia 12/31/2020    Myocardial infarction ([address] 1999    Myocardial infarction ([address] 11/10/2015    Obesity 11/10/2015    Orthostatic hypotension 11/10/2015    Osteoarthritis 11/10/2015    Peripheral vascular disease ([address] 11/10/2015    PNA (pneumonia) 02/08/2019    PVC (premature ventricular contraction)     Rash 35/79/2204    Seroma complicating a procedure 10/02/2014    Sleep apnea     cpap    Toe laceration     Type 2 diabetes with nephropathy ([address]     Uncontrolled type 2 diabetes mellitus 11/10/2015    Vertiginous syndromes and other disorders of vestibular system 11/10/2015        PAST SURGICAL HISTORY  Past Surgical History:   Procedure Laterality Date    CARDIAC CATHETERIZATION  12/05/2018    LV EF=40%. LM:nl. LAD:30-40% mid stenosis. LCX OM1:95% stenosis. RCA:100% occlusion at RV branch.     CATARACT REMOVAL Right 07/20/2022    CORONARY ANGIOPLASTY WITH STENT PLACEMENT  12/05/2018    LCX OM1:2.5 x 12 Giuseppe RAKESH    CORONARY ANGIOPLASTY WITH STENT PLACEMENT  1999    MD UNLISTED PROCEDURE ABDOMEN PERITONEUM & OMENTUM  1960    abdominal cyst removed    [address] [place] Capulin stents x3    SPINE SURGERY N/A 2022    LUMBAR 2, LUMBAR 4 KYPHOPLASTY AND ANY OTHER INDICATED LEVELS performed by Carla Schuster III, MD at [facility] MAIN OR    VASCULAR SURGERY  14    Repair of right common femoral artery     VASCULAR SURGERY  14    tiffanie femoral endarterectomy    VASCULAR SURGERY Left 10/28/2022    LEFT LOWER EXTREMITY ARTERIOGRAM / ULTRASOUND GUIDED ACCESS   performed by Gaylyn Pallas, MD at [facility] MAIN OR       FAMILY HISTORY  Family History   Problem Relation Age of Onset    Breast Cancer Mother     Hypertension Mother     Other Father         DIVERTICULITIS    Crohn's Disease Sister     Lung Disease Brother         mesothioloma    Diabetes Sister     Heart Attack Father     Heart Disease Father     Stroke Mother        SOCIAL HISTORY  Social History     Tobacco Use    Smoking status: Former     Packs/day: 1.00     Years: 50.00     Pack years: 50.00     Types: Cigarettes     Quit date: 10/2/2011     Years since quittin.4    Smokeless tobacco: Current     Types: Chew    Tobacco comments:     Chews tobacco daily   Vaping Use    Vaping Use: Never used   Substance Use Topics    Alcohol use: Yes     Alcohol/week: 0.0 standard drinks    Drug use: No       ALLERGIES  Allergies   Allergen Reactions    Acetaminophen Hives     Per spouse has small amount of hives, takes 1/2 and tolerates     Azithromycin Hives    Morphine Hallucinations     Muscle twitching. jerking    Oxaprozin Other (See Comments)     ELEVATED BLOOD PRESSURE    Oxycodone Anxiety       MEDICATIONS  Current Outpatient Medications on File Prior to Encounter   Medication Sig Dispense Refill    fluticasone (FLONASE) 50 MCG/ACT nasal spray USE 1 OR 2 SPRAYS IN EACH NOSTRIL EVERY DAY.  3 each 3    carvedilol (COREG) 6.25 MG tablet Take 1 tablet by mouth 2 times daily (with meals) 60 tablet 1    ferrous sulfate (IRON 325) 325 (65 Fe) MG tablet Take 1 tablet by mouth daily (with breakfast) 30 tablet 3    montelukast (SINGULAIR) 10 MG tablet TAKE 1 TABLET BY MOUTH EVERY DAY AT BEDTIME 90 tablet 3    magnesium oxide (MAG-OX) 400 (240 Mg) MG tablet TAKE 1 TABLET BY MOUTH EVERY DAY 90 tablet 3    albuterol (PROVENTIL) (2.5 MG/3ML) 0.083% nebulizer solution 1 vial via nebulizer 4 times daily if needed for shortness of breath or wheezing. Diagnosis-COPD, J44.9. Bill to Medicare part B 360 mL 11    senna-docusate (PERICOLACE) 8.6-50 MG per tablet Take 1 tablet by mouth nightly      CPAP Machine MISC by Does not apply route      albuterol sulfate HFA (PROVENTIL;VENTOLIN;PROAIR) 108 (90 Base) MCG/ACT inhaler USE 2 PUFFS BY INHALATION 4 TIMES DAILY AS NEEDED FOR WHEEZING OR SHORTNESS OF BREATH. Patient prefers ventolin. 18 g 11    GUAIFENESIN 1200 PO Take 1 tablet by mouth every 12 hours as needed      acetaminophen (TYLENOL) 500 MG tablet Take 500 mg by mouth every 6 hours as needed for Pain Taking 1/2 two times daily      glipiZIDE (GLUCOTROL XL) 10 MG extended release tablet Take 1 tablet by mouth daily 30 tablet 0    furosemide (LASIX) 20 MG tablet Take 1 tablet by mouth in the morning. (Patient taking differently: Take 20 mg by mouth daily 1/2 tablet) 60 tablet 3    [DISCONTINUED] amLODIPine (NORVASC) 5 MG tablet Take 1 tablet by mouth in the morning. 30 tablet 3    [DISCONTINUED] QUEtiapine (SEROQUEL) 25 MG tablet Take 1 tablet by mouth in the morning.  60 tablet 3    rosuvastatin (CRESTOR) 10 MG tablet TAKE 1 TABLET BY MOUTH EVERY DAY (Patient taking differently: at bedtime) 90 tablet 0    fluticasone-salmeterol (ADVAIR) 250-50 MCG/ACT AEPB diskus inhaler Inhale 1 puff into the lungs in the morning and at bedtime      amiodarone (CORDARONE) 200 MG tablet Take 200 mg by mouth daily      apixaban (ELIQUIS) 5 MG TABS tablet Take 5 mg by mouth every 12 hours      clopidogrel (PLAVIX) 75 MG tablet Take 75 mg by mouth daily      latanoprost (XALATAN) 0.005 % ophthalmic solution Apply 1 drop to eye nightly      nitroGLYCERIN (NITROSTAT) 0.4 MG SL tablet Place 0.4 mg under the tongue      [DISCONTINUED] metFORMIN (GLUCOPHAGE) 500 MG tablet TAKE TWO TABLETS BY MOUTH 2 TIMES A DAY       No current facility-administered medications on file prior to encounter. REVIEW OF SYSTEMS  The patient has no difficulty with chest pain or shortness of breath. No fever or chills. Comprehensive review of systems was otherwise unremarkable except as noted above. Objective:   Physical Exam:   Recent vitals (if inpt):  Patient Vitals for the past 24 hrs:   BP Temp Temp src Pulse Resp SpO2 Height Weight   02/27/23 1147 131/69 97.8 °F (36.6 °C) Oral 61 18 96 % -- --   02/27/23 1055 -- -- -- -- -- -- 6' 1\" (1.854 m) 238 lb (108 kg)         /69   Pulse 61   Temp 97.8 °F (36.6 °C) (Oral)   Resp 18   Ht 6' 1\" (1.854 m)   Wt 238 lb (108 kg)   SpO2 96%   BMI 31.40 kg/m²   Wt Readings from Last 3 Encounters:   02/27/23 238 lb (108 kg)   02/20/23 238 lb (108 kg)   02/15/23 238 lb (108 kg)     Constitutional: Alert, oriented, cooperative patient in no acute distress; appears stated age;  General appearance is within normal limits for wound care patient population. See the today's recorded vitals signs and constitutional data. Eyes: Pupils equal; Sclera are clear. EOMs intact; The eyes appear to track and move normally. The sclera are not injected. The conjunctive are clear. The eyelids are normal. There is no scleral icterus. ENMT: There are no obvious external ear, nose, lip or mouth lesions. Nares normal; Neck: Overall contour of the neck is normal with no obvious neck masses. Gross hearing is within normal limits. No obvious neck masses  Resp:  Breathing is non-labored; normal rate and effort; no audible wheezing. CV: RRR;  no JVD; No evidence of cyanosis of the upper extremities. The extremities are perfused without embolic sign, splinter hemorrhages, or petechia. GI: soft and non-distended without acute abnormality noted. Musculoskeletal: unremarkable with normal function. No embolic signs or cyanosis. Neuro:  Oriented; moves all 4; no focal deficits  Psychiatric: Judgement and insight are within normal limits for the wound care population of patients. Patient is oriented to person, place, and time. Recent and remote memory are within normal limits. Mood and affect are within normal limits. Integumentary (Skin/Wounds)  See inspection of wound(s) below. There are no other skin areas of palpable concern. See wound center documentation including photos in the [address] Wound Template made part of this record by reference.          Wound Care Documentation:    Wound 09/09/22 Heel Left;Lateral (Active)   Wound Image   02/27/23 1107   Wound Etiology Diabetic Martinez 2 02/27/23 1107   Dressing Status Old drainage noted 02/27/23 1107   Wound Cleansed Cleansed with saline 02/27/23 1107   Dressing/Treatment Collagen with Ag 02/20/23 1138   Offloading for Diabetic Foot Ulcers Offloading ordered 02/27/23 1107   Wound Length (cm) 0.7 cm 02/27/23 1107   Wound Width (cm) 0.9 cm 02/27/23 1107   Wound Depth (cm) 0.2 cm 02/27/23 1107   Wound Surface Area (cm^2) 0.63 cm^2 02/27/23 1107   Change in Wound Size % (l*w) 89.5 02/27/23 1107   Wound Volume (cm^3) 0.126 cm^3 02/27/23 1107   Wound Healing % 95 02/27/23 1107   Wound Assessment Pink/red 02/27/23 1107   Drainage Amount Small 02/27/23 1107   Drainage Description Serosanguinous 02/27/23 1107   Odor None 02/27/23 1107   Joanna-wound Assessment Intact 02/27/23 1107   Margins Attached edges 02/20/23 1138   Wound Thickness Description not for Pressure Injury Full thickness 02/27/23 1107   Number of days: 170       Wound 09/13/22 Buttocks moisture skin damage (Active)   Number of days: 167        Initial WC visit 10/24/2022      F/U on 12/1/2022    Amount and/or Complexity of Data Reviewed and Analyzed:  I reviewed and analyzed all of the unique labs and radiologic studies that are shown below as well as any that are in the HPI, and any that are in the expanded problem list below  *Each unique test, order, or document contributes to the combination of 2 or combination of 3 in Category 1 below. I also independently reviewed radiology images for studies I described above in the HPI or studies I have ordered. For this visit I also reviewed old records and prior notes. No results for input(s): WBC, HGB, PLT, NA, K, CL, CO2, BUN, CREA, GLU, INR, APTT, ALT, AML, AML, LCAD, PCO2, PO2, HCO3 in the last 72 hours. Invalid input(s): PTP, TBIL, TBILI, CBIL, SGOT, GPT, AP, LPSE, NH4, TROPT, TROIQ,  PH    No results for input(s): INR, APTT, ALT, AML in the last 72 hours.     Invalid input(s): PTP, TBIL, TBILI, CBIL, SGOT, GPT, AP, LPSE      Most recent blood counts (CBC) review  Lab Results   Component Value Date    WBC 5.6 02/15/2023    HGB 10.0 (L) 02/15/2023    HCT 30.6 (L) 02/15/2023    MCV 95.9 02/15/2023     02/15/2023     Most recent chemistry (BMP) test review   Lab Results   Component Value Date/Time     02/15/2023 10:31 AM    K 3.6 02/15/2023 10:31 AM     02/15/2023 10:31 AM    CO2 31 02/15/2023 10:31 AM    BUN 8 02/15/2023 10:31 AM    CREATININE 0.80 02/15/2023 10:31 AM    GLUCOSE 119 02/15/2023 10:31 AM    CALCIUM 9.0 02/15/2023 10:31 AM      Most recent Liver enzyme test review  Lab Results   Component Value Date    ALT 30 02/15/2023    AST 15 02/15/2023    ALKPHOS 81 02/15/2023    BILITOT 0.6 02/15/2023     Most recent coagulation (coags) review  @lastinr@  Lab Results   Component Value Date/Time    INR 1.3 07/30/2022 09:53 AM    APTT 63.6 08/02/2022 05:41 AM    APTT 24.3 03/04/2022 03:57 PM       Diabetic assessment  Hemoglobin A1C   Date Value Ref Range Status   12/29/2022 5.6 4.8 - 5.6 % Final       Nutritional assessment screen to assess wound healing ability:  Lab Results   Component Value Date    LABALBU 3.2 02/15/2023     No results found for: TP, ALBR, ALB    Xray Result (most recent):  XR CHEST PORTABLE 02/06/2023    Narrative  EXAMINATION: XR CHEST PORTABLE    DATE: 2/6/2023 10:30 PM    INDICATION: ; Sepsis    COMPARISON:  December 20, 2022    FINDINGS:      Moderate right lower lung consolidative opacity is new. Minor background  interstitial opacities are redemonstrated. No visible pleural effusion or pneumothorax. Cardiomediastinal silhouette  unchanged. Cardiac device with leads in the right  atrium and partly imaged in the right ventricle. Impression  1. Right lower pneumonia. Recommend chest radiographic follow-up in eight weeks  for resolution. Bernie Hahn M.D.  2/6/2023 11:37:00 PM    CT Result (most recent):  CT HEAD WO CONTRAST 12/29/2022    Narrative  CT HEAD WITHOUT CONTRAST, 12/29/2022    History: Altered mental status. Comparison: None    Technique:   5 mm axial scans from the skull base to the vertex. All CT scans  performed at this facility use one or all of the following: Automated exposure  control, adjustment of the mA and/or kVp according to patient's size, iterative  reconstruction. Findings:  No evidence of intracranial hemorrhage is seen. No abnormal  extra-axial fluid collections are seen. Age related chronic cortical volume  loss is seen. No evidence for acute hydrocephalus is seen. No evidence of  midline shift or herniation is seen. No abnormal edema pattern is seen in a  vascular distribution to suggest large artery infarction. Evaluation with bone windows shows no acute osseous changes. There is a  moderate right mastoid effusion with fluid entering the right middle ear. Impression  1. No acute intracranial process evident by noncontrast CT study of the head. 2.  Moderate right mastoid effusion with fluid entering the right middle ear. This can been indicator for right otomastoiditis. Recommend clinical  correlation. This report was made using voice transcription.  Despite my best efforts to avoid  any, transcription errors may persist. If there is any question about the  accuracy of the report or need for clarification, then please call [phone], or text me through perfectserv for clarification or correction. 07/06/22    VAS DUP LOWER EXT ARTERIES BILATERAL W JOVANA 07/07/2022  7:57 AM (Final)    Interpretation Summary    Right lower extremity: The JOVANA (ankle-brachial index) is 0.78 and is abnormal. The lower extremity arterial duplex reveals an occlusion of the proximal superficial femoral artery with reconstitution at the distal proximal superficial femoral artery. There is monophasic flow in the GILBERTO, PTA and peroneal arteries at the ankle. The great toe pressure is 64mmHg. Left lower extremity: Right lower extremity: The JOVANA (ankle-brachial index) is 0.60 and is abnormal. The lower extremity arterial duplex reveals an occlusion of the proximal superficial femoral artery with reconstitution at the below knee popliteal artery. There is monophasic flow in the GILBERTO, PTA and peroneal arteries at the ankle. The great toe pressure is 63mmHg. The abdominal aorta was evaluated and measured 2.8cm x 2.9cm at its maximum diameter, no aneurysm visualized on limited evaluation of abdominal aorta. Signed by: Akua Selby MD on 7/7/2022  7:57 AM        Admission date (for inpatients): 2/27/2023   Day of Surgery  * No procedures listed *    Procedure:  Skin Substitute Application    Performed by: Kevin Potts MD  Ulcer Type: arterial and diabetic  Consent obtained: Yes  Time out taken: Yes    Product Utilized:  NuShield 1.6 sq/cm  Fenestrated at the time of procedure: Yes  Instrument(s) utilized during the procedure: #15 surgical blade    Skin Substitute was Applied to Ulcer Number(s):    Ulcer #: 1  Total Surface Area of Ulcer(s) Covered 0.63 sq/cm  Was the Product Layered  No   Amount of Product Applied 1.6 sq/cm   Amount of Product Wasted 0 sq/cm   Reason for Waste: N/A.   Skin Substitute was surgically fixated and secured with Other: silicone dressing.    Wound 09/09/22 Heel Left;Lateral (Active)   Wound Image   02/27/23 1107   Wound Etiology Diabetic Martinez 2 02/27/23 1107   Dressing Status Old drainage noted 02/27/23 1107   Wound Cleansed Cleansed with saline 02/27/23 1107   Dressing/Treatment Collagen with Ag 02/20/23 1138   Offloading for Diabetic Foot Ulcers Offloading ordered 02/27/23 1107   Wound Length (cm) 0.7 cm 02/27/23 1107   Wound Width (cm) 0.9 cm 02/27/23 1107   Wound Depth (cm) 0.2 cm 02/27/23 1107   Wound Surface Area (cm^2) 0.63 cm^2 02/27/23 1107   Change in Wound Size % (l*w) 89.5 02/27/23 1107   Wound Volume (cm^3) 0.126 cm^3 02/27/23 1107   Wound Healing % 95 02/27/23 1107   Wound Assessment Pink/red 02/27/23 1107   Drainage Amount Small 02/27/23 1107   Drainage Description Serosanguinous 02/27/23 1107   Odor None 02/27/23 1107   Joanna-wound Assessment Intact 02/27/23 1107   Margins Attached edges 02/20/23 1138   Wound Thickness Description not for Pressure Injury Full thickness 02/27/23 1107   Number of days: 170       Wound 09/13/22 Buttocks moisture skin damage (Active)   Number of days: 167      Procedural Pain: 0/10   Post Procedural Pain: 0 / 10  Response to Treatment: Patient tolerated procedure well with no complaints of pain.      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 *  Resolved Problems:    * No resolved hospital problems.  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 ([facility] [address] [address] 10/24/2022     Priority: High    Antiplatelet or antithrombotic long-term use 10/24/2022     Priority: High     Eliquis and Plavix      Chest pain 12/05/2018     Priority: High    Acute respiratory failure with hypoxia ([facility] [address] 02/07/2023     Priority: Medium    Hospital-acquired bacterial pneumonia 02/07/2023     Priority: Medium    COPD exacerbation ([facility] 12/29/2022     Priority: Medium    Influenza 12/29/2022     Priority: Medium    Lumbar discitis 09/08/2022     Priority: Medium    Psoas muscle abscess ([facility] 09/08/2022     Priority: Medium    Physical debility 08/26/2022     Priority: Medium    General weakness 07/27/2022     Priority: Medium    Acute cystitis without hematuria 07/27/2022     Priority: Medium    Low back pain without sciatica 07/27/2022     Priority: Medium    Back pain 07/27/2022     Priority: Medium    DNI (do not intubate) 07/27/2022     Priority: Medium    Elevated liver enzymes 07/27/2022     Priority: Medium    Anemia 07/27/2022     Priority: Medium    Hyponatremia 07/27/2022     Priority: Medium    ICD (implantable cardioverter-defibrillator) in place 06/16/2022     Priority: Medium    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, vertebra(e), init ([facility] 07/07/2022     Priority: Low     Added automatically from request for surgery [id number]      Atrial fibrillation ([facility] 03/04/2022     Priority: Low    Ventricular tachycardia 03/04/2022     Priority: Low    VT (ventricular tachycardia) 03/04/2022     Priority: Low    Acute metabolic encephalopathy 01/86/3454     Priority: Low    Irregular heart beat 03/02/2021     Priority: Low    PVC's (premature ventricular contractions) 03/02/2021     Priority: Low    Elevated troponin 12/31/2020     Priority: Low    Toe laceration 12/09/2019     Priority: Low    Type 2 diabetes with nephropathy ([facility] 06/17/2019     Priority: Low    Hypoxia 02/08/2019     Priority: Low    Sepsis ([facility] 02/08/2019     Priority: Low    Abnormal nuclear cardiac imaging test 11/27/2018     Priority: Low    Cigarette nicotine dependence in remission 08/20/2018     Priority: Low    Chronic pain of right knee 12/14/2017     Priority: Low    Obstructive sleep apnea 08/11/2017     Priority: Low    Intermittent spinal claudication ([facility] 06/13/2017     Priority: Low    Pulmonary emphysema ([facility] 11/17/2016     Priority: Low    H/O endarterectomy 11/10/2015     Priority: Low    Complicated wound infection 11/10/2015     Priority: Low    Allergic rhinitis 11/10/2015     Priority: Low    Hiatal hernia 11/10/2015     Priority: Low    Diabetic neuropathy ([facility] 11/10/2015     Priority: Low    Osteoarthritis 11/10/2015     Priority: Low    Encounter for long-term (current) drug use 11/10/2015     Priority: Low    Benign neoplasm of colon 11/10/2015     Priority: Low    PAD (peripheral artery disease) ([facility] 11/10/2015     Priority: Low    Asthma 11/10/2015     Priority: Low    Orthostatic hypotension 11/10/2015     Priority: Low    Hyperlipidemia 11/10/2015     Priority: Low    S/P angioplasty with stent 11/10/2015     Priority: Low    COPD (chronic obstructive pulmonary disease) ([facility] 04/10/2015     Priority: Low    Arterial degeneration 11/05/2014     Priority: Low     11/6/14 (Dr Nadia Chen) 1. Bleeding from right groin wound. 2. Disruption of right common femoral artery patch anastomosis and   possible arterial infection.         Normocytic anemia 10/23/2014     Priority: Low    MINI (acute kidney injury) ([facility] 09/15/2014     Priority: Low    DM (diabetes mellitus) type II, controlled, with peripheral vascular disorder ([facility] 09/11/2014 Priority: Low    Atherosclerosis of native arteries of extremity with intermittent claudication ([facility]) 08/15/2014     Priority: Low     9/11/14 (Dr Bauman) Bilateral common femoral endarterectomies.        Personal history of tobacco use, presenting hazards to health 02/12/2013     Priority: Low    Asbestos exposure 02/12/2013     Priority: Low    HTN (hypertension) 10/12/2011     Priority: Low    Severe obesity (BMI 35.0-39.9) with comorbidity ([facility]) 10/12/2011     Priority: Low    Sleep apnea 10/12/2011     Priority: Low    Ischemic cardiomyopathy 10/12/2011     Priority: Low    Coronary artery disease involving native coronary artery of native heart without angina pectoris 10/12/2011     Priority: Low           Plan:     Number and Complexity of Problems addressed and   Risks of complications and/or morbidity of management    Treatment Note please see attached Discharge Instructions  Any procedures done today in the wound center (if documented in a separate note) in [facility] are made part of this record by reference  Written patient dismissal instructions given to patient or POA.          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  He has had an ulcer on the left heel for several months.  He has been debilitated following back surgery but is also debilitated from multiple medical problems including cardiac disease, peripheral vascular disease, and diabetes.  He is anticoagulated on Eliquis and Plavix.  He has had femoral endarterectomies in the past and is now followed by Dr. Baugh.  JOVANA in July was abnormal and has not been reevaluated since  deterioration of his tissue. He is scheduled for repeat JOVANA in January and we will consult vascular surgery for earlier evaluation. Vascular consultation to follow as needed. He has not been offloading despite having work boots. There is clearly a pressure component. There is no mirror lesion on the right heel and may reflect his discrepancy in blood supply. He does describe some episodes that could be rest pain though it does not drop his legs for improvement. This may also be limited by his debility. We will not do debridement today. We will dress with Betadine and an absorbent pad and follow-up in 1 week. Offloading was emphasized. He returns on 10/31/2022. He underwent arteriography but is not a candidate for percutaneous intervention. He is being set up for formal bypass in November. Therapy is having him walk on his foot and offloading was again stressed. Current dressing remains Betadine. I will see him back in 1.5 weeks which will be before his bypass surgery. He returns on 11/10/2022. He is improved and has completed antibiotics. Current dressing is Betadine. He will see vascular surgery on Monday to schedule bypass surgery. He returns on 12/1/2022. He was seen by vascular surgery who have set him up for femoral to distal bypass in January, but feel it may not be necessary as he continues to heal.  This is probably secondary to better offloading. He was also seen by ID who also feel that the bypass should be avoided if possible since he is continuing to heal.  I agree with both specialists that he is continuing to heal and we will move forward with adjuncts to healing. We will start with skin substitutes. I will order Jennifer WHITE for next visit. This would be the best place to start when we cannot perform aggressive debridement due to his vascular status. We will then transition to a growth factor product. Returns on 12/8/2022. There continues to be some improvement.   Still some issues with understanding offloading. PuraPly AM #1 was applied today and well-tolerated. We will order a graft for next week. He returns on 12/15/2022. There is some improvement. They have better understanding of offloading. PuraPly AM #2 was applied today and well-tolerated. He returns on 1/19/2023. He missed an appointment because he was hospitalized with bacteremia of unknown source. It was not from his foot. The foot wound has actually improved, likely from improved offloading while being hospitalized. PuraPly AM #3 was applied today and well-tolerated. He returns on 1/26/2023. Remains well and the wound continues to get smaller using PuraPly. Graft #4 was applied today and well-tolerated. He returns on 2/2/2023. The wound continues to become smaller and more epithelialized. PuraPly AM graft #5 was applied today and well-tolerated. We will order another graft for next week. He returns on 2/13/2023. He was actually hospitalized for pneumonia with hypoxia and missed that appointment. He was dressed with Aquacel Ag while he was in the hospital.  Wound appears slightly larger today which may be due to his hospitalization. We will transition from PuraPly AM to NuShield for his next appointment with me which will be in 2 weeks. He will return for a regular dressing change in 1 week. He returns on 2/27/2023. He continues to do well and there is improvement in the size of the wound. NuShield  #1 was grafted today and well-tolerated. We will order another graft for next week. I will see him back in 1 week. Wound Care  Orders Placed This Encounter   Procedures    Ambulatory Referral to DME     Referral Priority:   Routine     Referral Reason:   Specialty Services Required     Number of Visits Requested:   1         Return Appointment:   1 week with Patt Luz MD        Instructions: Left Heel:  Cleanse wound and periwound with wound cleanser or normal saline.    Applied NuShield # 1 (ID#: [id number]; exp date: 7/05/2027)  Secured with mepilex transfer, dry gauze, Mepilex, ABD, rolled gauze  Follow with Tubigrip F. Dressing change 1x weekly at [address] to apply NuShield #2 at next appointment. Offload pressure from heel through use of wheelchair and foam at night          Level of MDM (2/3 elements below)  Number and Complexity of Problems Addressed Amount and/or Complexity of Data to be Reviewed and Analyzed  *Each unique test, order, or document contributes to the combination of 2 or combination of 3 in Category 1 below. Risk of Complications and/or Morbidity or Mortality of pt Management     33194  50811 SF Minimal  ?1self-limited or minor problem Minimal or none Minimal risk of morbidity from additional diagnostic testing or Rx   00536  66658 Low Low  ? 2or more self-limited or minor problems;    or  ? 1stable chronic illness;    or  ?1acute, uncomplicated illness or injury   Limited  (Must meet the requirements of at least 1 of the 2 categories)  Category 1: Tests and documents   ? Any combination of 2 from the following:  ?Review of prior external note(s) from each unique source*;  ?review of the result(s) of each unique test*;   ?ordering of each unique test*    or   Category 2: Assessment requiring an independent historian(s)  (For the categories of independent interpretation of tests and discussion of management or test interpretation, see moderate or high) Low risk of morbidity from additional diagnostic testing or treatment     93671  41863 Mod Moderate  ? 1or more chronic illnesses with exacerbation, progression, or side effects of treatment;    or  ?2or more stable chronic illnesses;    or  ?1undiagnosed new problem with uncertain prognosis;    or  ?1acute illness with systemic symptoms;    or  ?1acute complicated injury   Moderate  (Must meet the requirements of at least 1 out of 3 categories)  Category 1: Tests, documents, or independent historian(s)  ? Any combination of 3 from the following:   ?Review of prior external note(s) from each unique source*;  ?Review of the result(s) of each unique test*;  ?Ordering of each unique test*;  ?Assessment requiring an independent historian(s)    or  Category 2: Independent interpretation of tests   ? Independent interpretation of a test performed by another physician/other qualified health care professional (not separately reported);     or  Category 3: Discussion of management or test interpretation  ? Discussion of management or test interpretation with external physician/other qualified health care professional/appropriate source (not separately reported)   Moderate risk of morbidity from additional diagnostic testing or treatment  Examples only:  ?Prescription drug management - advanced wound care prescription Rx wound care products  (eg Iodosorb, hydrofera, silvadene, collagen, puracol plus, optiloc, biologics - placenta, Theraskin, Apligraf). Note also that if home health care is involved, they will not apply topical therapies without a prescription/order from physician      ? Decision regarding minor surgery with identified patient or procedure risk factors  ? Decision regarding elective major surgery without identified patient or procedure risk factors   ? Diagnosis or treatment significantly limited by social determinants of health       [id number] 91462 High High  ? 1or more chronic illnesses with severe exacerbation, progression, or side effects of treatment;    or  ?1 acute or chronic illness or injury that poses a threat to life or bodily function   Extensive  (Must meet the requirements of at least 2 out of 3 categories)  Category 1: Tests, documents, or independent historian(s)  ? Any combination of 3 from the following:   ?Review of prior external note(s) from each unique source*;  ?Review of the result(s) of each unique test*;   ?Ordering of each unique test*;   ?Assessment requiring an independent historian(s)    or   Category 2: Independent interpretation of tests   ? Independent interpretation of a test performed by another physician/other qualified health care professional (not separately reported);     or  Category 3: Discussion of management or test interpretation  ? Discussion of management or test interpretation with external physician/other qualified health care professional/appropriate source (not separately reported)   High risk of morbidity from additional diagnostic testing or treatment  Examples only:  ?Drug therapy requiring intensive monitoring for toxicity  ? Decision regarding elective major surgery with identified patient or procedure risk factors  ? Decision regarding emergency major surgery  ? Decision regarding hospitalization  ? Decision not to resuscitate or to de-escalate care because of poor prognosis                 I have personally performed a face-to-face diagnostic evaluation and management  service on this patient. I have independently seen the patient. I have independently obtained the above history from the patient/family. I have independently examined the patient with above findings. I have independently reviewed data/labs for this patient and developed the above plan of care (MDM).       Electronically signed by Garrick Angulo MD on 2/27/2023 at 12:30 PM

## 2023-02-27 NOTE — DISCHARGE INSTRUCTIONS
Return Appointment:   1 week with Yonis Hidalgo MD        Instructions: Left Heel:  Cleanse wound and periwound with wound cleanser or normal saline. Applied NuShield # 1 (ID#: [id number]; exp date: 7/05/2027)  Secured with mepilex transfer, dry gauze, Mepilex, ABD, rolled gauze  Follow with Tubigrip F. Dressing change 1x weekly at [address] to apply NuShield #2 at next appointment.      Offload pressure from heel through use of wheelchair and foam at night

## 2023-02-27 NOTE — WOUND CARE
Discharge Instructions for  Windy Fairchild  [address] [place] [address], [address] [place] [address] [place]   Phone [phone]   Fax [phone]      NAME:  Emily Jaimes OF BIRTH:  1945  MEDICAL RECORD NUMBER:  [id number]  DATE:  2/27/2023    Return Appointment:   1 week with Vignesh Case MD      Instructions: Left Heel:  Cleanse wound and periwound with wound cleanser or normal saline. Applied NuShield # 1 (ID#: [id number]; exp date: 7/05/2027)  Secured with mepilex transfer, dry gauze, Mepilex, ABD, rolled gauze  Follow with Tubigrip F. Dressing change 1x weekly at [address] to apply NuShield #2 at next appointment. Offload pressure from heel through use of wheelchair and foam at night               Should you experience increased redness, swelling, pain, foul odor, size of wound(s), or have a temperature over 101 degrees please contact the [address] [place] [address] Road at [phone] or if after hours contact your primary care physician or go to the hospital emergency department. PLEASE NOTE: IF YOU ARE UNABLE TO OBTAIN WOUND SUPPLIES, CONTINUE TO USE THE SUPPLIES YOU HAVE AVAILABLE UNTIL YOU ARE ABLE TO REACH US. IT IS MOST IMPORTANT TO KEEP THE WOUND COVERED AT ALL TIMES.     Electronically signed Vance Klein PT, [address] [facility] on 2/27/2023 at 11:27 AM

## 2023-02-27 NOTE — FLOWSHEET NOTE
02/27/23 1107   Wound 09/09/22 Heel Left;Lateral   Date First Assessed/Time First Assessed: 09/09/22 1535   Present on Hospital Admission: Yes  Primary Wound Type: Diabetic Ulcer  Location: Heel  Wound Location Orientation: Left;Lateral   Wound Etiology Diabetic Martinez 2   Dressing Status Old drainage noted   Wound Cleansed Cleansed with saline   Dressing/Treatment   (Puraply, bordered foam)   Offloading for Diabetic Foot Ulcers Offloading ordered   Wound Length (cm) 0.7 cm   Wound Width (cm) 0.9 cm   Wound Depth (cm) 0.2 cm   Wound Surface Area (cm^2) 0.63 cm^2   Change in Wound Size % (l*w) 89.5   Wound Volume (cm^3) 0.126 cm^3   Wound Healing % 95   Wound Assessment Pink/red   Drainage Amount Small   Drainage Description Serosanguinous   Odor None   Joanna-wound Assessment Intact   Wound Thickness Description not for Pressure Injury Full thickness   Pain Assessment   Pain Assessment None - Denies Pain   Patient is currently taking Plavix and Eliquis

## 2023-02-27 NOTE — PATIENT INSTRUCTIONS
Use albuterol via nebulizer or inhaler, 3-4 times daily for now. Increase Advair to 1 inhalation twice daily, time dose to follow albuterol in the morning and evening. Rinse mouth after use. OTC mucolytic, (mucinex or generic equivalent of guaifenesin), 2400mg in 24 hours in divided doses as needed to thin mucous. Follow-up in 6 weeks with chest x-ray to assess for clearing of right lower lobe pneumonia. Continue CPAP as prescribed.

## 2023-03-01 ENCOUNTER — CARE COORDINATION (OUTPATIENT)
Dept: CARE COORDINATION | Facility: CLINIC | Age: 78
End: 2023-03-01

## 2023-03-01 NOTE — CARE COORDINATION
[address] Care Transitions Follow Up Call    Patient Current Location:  Home: [address] Coordinator contacted the patient by telephone to follow up after admission on . Verified name and  with patient as identifiers. Patient: Casey Brower  Patient : 1945   MRN: [id number]  Reason for Admission: Acute respiratory failure  Discharge Date: 23 RARS: Readmission Risk Score: 31      Needs to be reviewed by the provider   Additional needs identified to be addressed with provider: No  none             Method of communication with provider: none    Spoke with patient states doing fine. Patient denies any increased shortness of breath. Patient states using CPAP machine. Patient states attended pulmonology appointment on 2023. This Care Coordinator will graduate this patient from this program.    Addressed changes since last contact:  none  Discussed follow-up appointments. If no appointment was previously scheduled, appointment scheduling offered: Yes. Is follow up appointment scheduled within 7 days of discharge? Yes. Follow Up  Future Appointments   Date Time Provider Mac Almaraz   3/6/2023 11:00 AM Tyrese Galo MD [facility]   3/6/2023  2:45 PM Neela Estrada MD [facility] AM[facility]   4/10/2023  1:40 PM AARON Weathers - CNP [facility]   2023  2:15 PM Gorge Hahn MD [facility]     Non-[facility] follow up appointment(s): n/a    LPN Care Coordinator reviewed medical action plan with patient and discussed any barriers to care and/or understanding of plan of care after discharge. Discussed appropriate site of care based on symptoms and resources available to patient including: PCP  Specialist  When to call 911. The patient agrees to contact the PCP office for questions related to their healthcare. Advance Care Planning:   decision maker updated. Patients top risk factors for readmission: medical condition-Acute resp. failure  Interventions to address risk factors: Assessment and support for treatment adherence and medication management-     Offered patient enrollment in the Remote Patient Monitoring (RPM) program for in-home monitoring: NA.     Care Transitions Subsequent and Final Call    Subsequent and Final Calls  Do you have any ongoing symptoms?: No  Have your medications changed?: No  Do you have any questions related to your medications?: No  Do you currently have any active services?: No  Are you currently active with any services?: Home Health  Do you have any needs or concerns that I can assist you with?: No  Identified Barriers: None  Care Transitions Interventions  Other Interventions:             LPN Care Coordinator provided contact information for future needs. No further follow-up call indicated based on severity of symptoms and risk factors.   Plan for next call:     Gonzalo Starr LPN

## 2023-03-06 ENCOUNTER — OFFICE VISIT (OUTPATIENT)
Dept: INTERNAL MEDICINE CLINIC | Facility: CLINIC | Age: 78
End: 2023-03-06
Payer: MEDICARE

## 2023-03-06 ENCOUNTER — HOSPITAL ENCOUNTER (OUTPATIENT)
Dept: WOUND CARE | Age: 78
Discharge: HOME | End: 2023-03-06
Payer: MEDICARE

## 2023-03-06 VITALS
RESPIRATION RATE: 18 BRPM | SYSTOLIC BLOOD PRESSURE: 107 MMHG | BODY MASS INDEX: 31.41 KG/M2 | WEIGHT: 237 LBS | HEART RATE: 60 BPM | HEIGHT: 73 IN | DIASTOLIC BLOOD PRESSURE: 53 MMHG

## 2023-03-06 VITALS
HEART RATE: 60 BPM | HEIGHT: 73 IN | DIASTOLIC BLOOD PRESSURE: 62 MMHG | SYSTOLIC BLOOD PRESSURE: 115 MMHG | TEMPERATURE: 98.3 F | WEIGHT: 237 LBS | BODY MASS INDEX: 31.41 KG/M2

## 2023-03-06 DIAGNOSIS — E11.42 DIABETIC POLYNEUROPATHY ASSOCIATED WITH TYPE 2 DIABETES MELLITUS (HCC): ICD-10-CM

## 2023-03-06 DIAGNOSIS — L97.422 NON-PRESSURE CHRONIC ULCER OF LEFT HEEL AND MIDFOOT WITH FAT LAYER EXPOSED (HCC): Primary | ICD-10-CM

## 2023-03-06 DIAGNOSIS — J43.9 PULMONARY EMPHYSEMA, UNSPECIFIED EMPHYSEMA TYPE (HCC): ICD-10-CM

## 2023-03-06 DIAGNOSIS — R53.81 PHYSICAL DEBILITY: ICD-10-CM

## 2023-03-06 DIAGNOSIS — N40.1 BPH WITH OBSTRUCTION/LOWER URINARY TRACT SYMPTOMS: ICD-10-CM

## 2023-03-06 DIAGNOSIS — I70.90: ICD-10-CM

## 2023-03-06 DIAGNOSIS — Z98.890 H/O ENDARTERECTOMY: ICD-10-CM

## 2023-03-06 DIAGNOSIS — L97.422 NON-PRESSURE CHRONIC ULCER OF LEFT HEEL AND MIDFOOT WITH FAT LAYER EXPOSED (HCC): ICD-10-CM

## 2023-03-06 DIAGNOSIS — I48.0 PAROXYSMAL ATRIAL FIBRILLATION (HCC): Primary | ICD-10-CM

## 2023-03-06 DIAGNOSIS — E11.21 TYPE 2 DIABETES WITH NEPHROPATHY (HCC): ICD-10-CM

## 2023-03-06 DIAGNOSIS — Z79.02 ANTIPLATELET OR ANTITHROMBOTIC LONG-TERM USE: ICD-10-CM

## 2023-03-06 DIAGNOSIS — E11.51 DM (DIABETES MELLITUS) TYPE II, CONTROLLED, WITH PERIPHERAL VASCULAR DISORDER (HCC): ICD-10-CM

## 2023-03-06 DIAGNOSIS — N13.8 BPH WITH OBSTRUCTION/LOWER URINARY TRACT SYMPTOMS: ICD-10-CM

## 2023-03-06 DIAGNOSIS — I73.9 PAD (PERIPHERAL ARTERY DISEASE) (HCC): ICD-10-CM

## 2023-03-06 PROCEDURE — G8427 DOCREV CUR MEDS BY ELIG CLIN: HCPCS | Performed by: INTERNAL MEDICINE

## 2023-03-06 PROCEDURE — 3078F DIAST BP <80 MM HG: CPT | Performed by: INTERNAL MEDICINE

## 2023-03-06 PROCEDURE — 3074F SYST BP LT 130 MM HG: CPT | Performed by: INTERNAL MEDICINE

## 2023-03-06 PROCEDURE — 3023F SPIROM DOC REV: CPT | Performed by: INTERNAL MEDICINE

## 2023-03-06 PROCEDURE — 99214 OFFICE O/P EST MOD 30 MIN: CPT | Performed by: INTERNAL MEDICINE

## 2023-03-06 PROCEDURE — G8417 CALC BMI ABV UP PARAM F/U: HCPCS | Performed by: INTERNAL MEDICINE

## 2023-03-06 PROCEDURE — 1123F ACP DISCUSS/DSCN MKR DOCD: CPT | Performed by: INTERNAL MEDICINE

## 2023-03-06 PROCEDURE — 4004F PT TOBACCO SCREEN RCVD TLK: CPT | Performed by: INTERNAL MEDICINE

## 2023-03-06 PROCEDURE — 15271 SKIN SUB GRAFT TRNK/ARM/LEG: CPT

## 2023-03-06 PROCEDURE — G8484 FLU IMMUNIZE NO ADMIN: HCPCS | Performed by: INTERNAL MEDICINE

## 2023-03-06 PROCEDURE — 1111F DSCHRG MED/CURRENT MED MERGE: CPT | Performed by: INTERNAL MEDICINE

## 2023-03-06 RX ORDER — LIDOCAINE 40 MG/G
CREAM TOPICAL ONCE
OUTPATIENT
Start: 2023-03-06 | End: 2023-03-06

## 2023-03-06 RX ORDER — LIDOCAINE HYDROCHLORIDE 40 MG/ML
SOLUTION TOPICAL ONCE
OUTPATIENT
Start: 2023-03-06 | End: 2023-03-06

## 2023-03-06 RX ORDER — BETAMETHASONE DIPROPIONATE 0.05 %
OINTMENT (GRAM) TOPICAL ONCE
OUTPATIENT
Start: 2023-03-06 | End: 2023-03-06

## 2023-03-06 RX ORDER — CLOBETASOL PROPIONATE 0.5 MG/G
OINTMENT TOPICAL ONCE
OUTPATIENT
Start: 2023-03-06 | End: 2023-03-06

## 2023-03-06 RX ORDER — ROSUVASTATIN CALCIUM 10 MG/1
TABLET, COATED ORAL
Qty: 90 TABLET | Refills: 3 | Status: SHIPPED | OUTPATIENT
Start: 2023-03-06

## 2023-03-06 RX ORDER — AMIODARONE HYDROCHLORIDE 200 MG/1
200 TABLET ORAL DAILY
Qty: 90 TABLET | Refills: 3 | Status: SHIPPED | OUTPATIENT
Start: 2023-03-06

## 2023-03-06 RX ORDER — CLOPIDOGREL BISULFATE 75 MG/1
TABLET ORAL
Qty: 90 TABLET | Refills: 2 | Status: SHIPPED | OUTPATIENT
Start: 2023-03-06

## 2023-03-06 RX ORDER — BACITRACIN ZINC AND POLYMYXIN B SULFATE 500; 1000 [USP'U]/G; [USP'U]/G
OINTMENT TOPICAL ONCE
OUTPATIENT
Start: 2023-03-06 | End: 2023-03-06

## 2023-03-06 RX ORDER — GENTAMICIN SULFATE 1 MG/G
OINTMENT TOPICAL ONCE
OUTPATIENT
Start: 2023-03-06 | End: 2023-03-06

## 2023-03-06 RX ORDER — LIDOCAINE HYDROCHLORIDE 20 MG/ML
JELLY TOPICAL ONCE
OUTPATIENT
Start: 2023-03-06 | End: 2023-03-06

## 2023-03-06 RX ORDER — GINSENG 100 MG
CAPSULE ORAL ONCE
OUTPATIENT
Start: 2023-03-06 | End: 2023-03-06

## 2023-03-06 RX ORDER — BACITRACIN, NEOMYCIN, POLYMYXIN B 400; 3.5; 5 [USP'U]/G; MG/G; [USP'U]/G
OINTMENT TOPICAL ONCE
OUTPATIENT
Start: 2023-03-06 | End: 2023-03-06

## 2023-03-06 RX ORDER — LIDOCAINE 50 MG/G
OINTMENT TOPICAL ONCE
OUTPATIENT
Start: 2023-03-06 | End: 2023-03-06

## 2023-03-06 RX ORDER — TAMSULOSIN HYDROCHLORIDE 0.4 MG/1
0.4 CAPSULE ORAL DAILY
Qty: 90 CAPSULE | Refills: 1 | Status: SHIPPED | OUTPATIENT
Start: 2023-03-06

## 2023-03-06 RX ORDER — LIDOCAINE HYDROCHLORIDE 20 MG/ML
JELLY TOPICAL ONCE
Status: COMPLETED | OUTPATIENT
Start: 2023-03-06 | End: 2023-03-06

## 2023-03-06 RX ORDER — FUROSEMIDE 20 MG/1
20 TABLET ORAL DAILY
Qty: 30 TABLET | Refills: 6 | Status: SHIPPED | OUTPATIENT
Start: 2023-03-06

## 2023-03-06 RX ADMIN — LIDOCAINE HYDROCHLORIDE: 20 JELLY TOPICAL at 11:24

## 2023-03-06 ASSESSMENT — ENCOUNTER SYMPTOMS
SHORTNESS OF BREATH: 0
WHEEZING: 0
DIARRHEA: 0
COUGH: 1
VOMITING: 0
NAUSEA: 0
CONSTIPATION: 0

## 2023-03-06 ASSESSMENT — PATIENT HEALTH QUESTIONNAIRE - PHQ9
2. FEELING DOWN, DEPRESSED OR HOPELESS: 0
SUM OF ALL RESPONSES TO PHQ9 QUESTIONS 1 & 2: 0
SUM OF ALL RESPONSES TO PHQ QUESTIONS 1-9: 0
SUM OF ALL RESPONSES TO PHQ QUESTIONS 1-9: 0
1. LITTLE INTEREST OR PLEASURE IN DOING THINGS: 0
SUM OF ALL RESPONSES TO PHQ QUESTIONS 1-9: 0
SUM OF ALL RESPONSES TO PHQ QUESTIONS 1-9: 0

## 2023-03-06 NOTE — WOUND CARE
Discharge Instructions for  Windy Fairchild  [address] [facility] [address]  [address], [address]  Phone [phone]   Fax [phone]      NAME:  Jet Cardona  YOB: 1945  MEDICAL RECORD NUMBER:  [id number]  DATE:  3/6/2023    Return Appointment:   2 weeks with Caleb Ward MD  1 week for dressing change    Instructions: Left Heel:  Cleanse wound and periwound with wound cleanser or normal saline. Vashe Wound Solution (hypochlorous acid) soak placed on wound bed for a minimum of 15 seconds prior to dressing application. This solution removes pathogens, bioburden, and biofilms from wounds, but is not cytotoxic. Applied NuShield # 2 (ID#: [id number]; exp date: 7/05/2027)  Secured with mepilex transfer, dry gauze, Mepilex, ABD, rolled gauze  Follow with Tubigrip F. Dressing change 1x weekly at [address] [facility]. Please leave lower layer of Mepilex covering graft in place & change the outside layers. Plan to apply NuShield #3 at next appointment. Offload pressure from heel through use of wheelchair and foam at night      Should you experience increased redness, swelling, pain, foul odor, size of wound(s), or have a temperature over 101 degrees please contact the [address] [place] [address] Road at [phone] or if after hours contact your primary care physician or go to the hospital emergency department. PLEASE NOTE: IF YOU ARE UNABLE TO OBTAIN WOUND SUPPLIES, CONTINUE TO USE THE SUPPLIES YOU HAVE AVAILABLE UNTIL YOU ARE ABLE TO REACH US. IT IS MOST IMPORTANT TO KEEP THE WOUND COVERED AT ALL TIMES.     Electronically signed Gricelda Donato RN, [facility] on 3/6/2023 at 11:25 AM

## 2023-03-06 NOTE — PROGRESS NOTES
[address]  Consult Note/H&P/Progress Note      [address] RECORD NUMBER:  [id number]  AGE: 65 y.o. RACE White (non-)  GENDER: male  : 1945  EPISODE DATE:  3/6/2023       Subjective:      CC (Chief Complaint) and HISTORY of PRESENT ILLNESS (HPI):    Mini Oliveira is a 65 y.o. male who presents today for wound/ulcer evaluation. He presents on 10/24/2022 with an ulcer wound on the left heel that has been present for several months. He was in his usual state of poor health when in July he had back surgery which led to a complicated abscess, prolonged hospital stay, and rehabilitation stay which ended just a few weeks ago. He is currently at home. He has significant debility. He is diabetic and has significant peripheral vascular disease with history of bilateral femoral endarterectomies. These were performed several years ago by Dr. Jim Khan. ABIs were performed in July just prior to his surgery with the JOVANA on the left of 0.6 with plan for follow-up this coming January to monitor. He has been lying in bed without use of Rooke boots and has developed an ulcer on the left heel. He has a history of MRSA infection in his back and was placed on a course of doxycycline. There is no evidence of active cellulitis today. He has had no imaging. He has not had repeat vascular evaluation. There is some description of pain in his leg at rest.  He is not active enough to discern any possible claudication. He is anticoagulated on both Eliquis and Plavix. There is no current dressing care plan. He returns on 10/31/2022. We are very fortunate that he was evaluated by vascular surgery who performed an arteriogram showing significant arterial disease. Unfortunately, his disease is not amenable to percutaneous intervention and will require a bypass in the coming weeks.   Debridement was not aggressively performed today but some loose tissue was selectively debrided and well-tolerated. He has been participating with home therapy including ambulation with a walker. Current dressing is Betadine. He returns on 11/10/2022. He is doing better with no evidence of cellulitis. He has completed antibiotics. The ulcer looks improved with current dressing of Betadine. He sees vascular on Monday to schedule bypass. He returns on 12/1/2022. He is actually improving with the heel ulcer showing improvement with just Betadine dressings. I believe he is offloading better than previously. I am not sure that the wife grasps the offloading and heel offloading concept. Vascular surgery has him set up for Femoral to distal bypass in January, but they feel he may heal without it. He was seen by ID who did not recommend further antibiotics for his foot or for his discitis. They recommend against the bypass if he continues to heal as well. He also developed issues with his eye with an acute glaucoma attack. He is being treated with nonsurgical methods at present but may need cataract surgery. He returns on 12/8/2022. There is some wound improvement. We performed skin substitute grafting with PuraPly AM #1 today which was well-tolerated. Offloading and protecting from pressure was again stressed. He returns on 12/15/2022. There is improvement and better offloading. PuraPly AM #2 was applied today and well-tolerated. He returns today on 1/19/2023. He was recently hospitalized with bacteremia of unknown source. The source was not his foot. He missed his last appointment due to this. He is doing better and the wound actually is improved probably from better offloading. PuraPly AM #3 was applied today and well tolerated. He returns on 1/26/2023. He remains well. The wound is improving and PuraPly AM #4 was applied today and well-tolerated. He returns on 2/2/2023. No medical issues.   The wound continues to improve and PuraPly AM #5 was applied today and well-tolerated. He returns on 2/13/2023. He was just released from hospital admission for pneumonia with hypoxia. He is doing better now with increased ambulation. There is possibly some slight deterioration of the wound that may be related to his hospitalization. Is still remains small and PuraPly AM #6 was applied today and well-tolerated. He returns on 2/27/2023. He had dressing changes since his last visit and grafting. There is improvement in the wound with epithelization coming from the edges. NuShield #1 was applied today and well-tolerated. He returns on 3/6/2023. There is improvement in the wound after his first NuShield . He is having some issues not applying pressure to the heel and we discussed this further. NuShield #2 was applied today and well-tolerated.     This information was obtained from the patient  The following HPI elements were documented for the patient's wound:  Location: Left heel  Duration: August/September 2022  Severity: Fat layer exposed  Context: Limited activity, much lying in bed, PVD, DM, no offloading  Modifying Factors:  Nothing makes better or worse  Quality: Full-thickness, painful  Timing: Constant  Associated Signs and Symptoms: Tissue loss and possible bout of cellulitis      Ulcer Identification:  Ulcer Type: Diabetic foot ulcer left heel with fat layer exposed  Contributing Factors: Inadequate offloading, PVD, anticoagulated    PuraPlyAM: #1 (12/8/2022), #2 (12/15), #3 (1/19/2023), #4 (1/26), #5 (2/2), #6 (2/13)  NuShield: #1 (2/27/2023), #2 (3/6)        PAST MEDICAL HISTORY  Past Medical History:   Diagnosis Date    Acute respiratory failure with hypoxia ([address] 10/23/2014    MINI (acute kidney injury) ([address] 09/15/2014    MINI (acute kidney injury) ([address]     MINI (acute kidney injury) ([address]     Allergic rhinitis 11/10/2015    Asthma 11/10/2015    Benign essential hypertension 11/10/2015    Benign neoplasm of colon 11/10/2015    CAD (coronary artery disease) 11/10/2015    CAD (coronary artery disease) 1998, 1999    mix2, 3 stents [id number]    CAP (community acquired pneumonia) 12/31/2020    Cardiomyopathy ([address] 30/62/1833    Complicated wound infection 11/10/2015    COPD (chronic obstructive pulmonary disease) with emphysema ([address] 11/10/2015    COPD exacerbation ([address] 10/12/2011    COVID-19 12/31/2020    Diabetes mellitus type 2, controlled ([address] 11/10/2015    doesn/t check daily oral meds, A1c 6.0 (8/10/22)    Diabetic neuropathy ([address] 11/10/2015    Disruption of wound, unspecified 10/09/2014    Encounter for long-term (current) use of other high-risk medications 11/10/2015    Extremity atherosclerosis with intermittent claudication ([address] 11/10/2015    H/O endarterectomy 11/10/2015    Hiatal hernia 11/10/2015    History of 2019 novel coronavirus disease (COVID-19)     12/31/2020- hospitalized    Hyperglycemia due to type 1 diabetes mellitus ([address] 11/10/2015    Hyperlipidemia 11/10/2015    ICD (implantable cardioverter-defibrillator) in place 06/16/2022    Monomorphic ventricular tachycardia 12/31/2020    Myocardial infarction ([address] 1999    Myocardial infarction ([address] 11/10/2015    Obesity 11/10/2015    Orthostatic hypotension 11/10/2015    Osteoarthritis 11/10/2015    Peripheral vascular disease ([address] 11/10/2015    PNA (pneumonia) 02/08/2019    PVC (premature ventricular contraction)     Rash 60/75/5180    Seroma complicating a procedure 10/02/2014    Sleep apnea     cpap    Toe laceration     Type 2 diabetes with nephropathy ([address]     Uncontrolled type 2 diabetes mellitus 11/10/2015    Vertiginous syndromes and other disorders of vestibular system 11/10/2015        PAST SURGICAL HISTORY  Past Surgical History:   Procedure Laterality Date    CARDIAC CATHETERIZATION  12/05/2018    LV EF=40%. LM:nl. LAD:30-40% mid stenosis. LCX OM1:95% stenosis. RCA:100% occlusion at RV branch.     CATARACT REMOVAL Right 07/20/2022    CORONARY ANGIOPLASTY WITH STENT PLACEMENT  2018    LCX OM1:2.5 x 12 Giuseppe RAKESH    CORONARY ANGIOPLASTY WITH STENT PLACEMENT      MI UNLISTED PROCEDURE ABDOMEN PERITONEUM & OMENTUM  1960    abdominal cyst removed     [address]    stents x3    SPINE SURGERY N/A 2022    LUMBAR 2, LUMBAR 4 KYPHOPLASTY AND ANY OTHER INDICATED LEVELS performed by Noemi Katz III, MD at [facility] MAIN OR    VASCULAR SURGERY  14    Repair of right common femoral artery     VASCULAR SURGERY  14    tiffanie femoral endarterectomy    VASCULAR SURGERY Left 10/28/2022    LEFT LOWER EXTREMITY ARTERIOGRAM / ULTRASOUND GUIDED ACCESS   performed by Jaycob Asif MD at [facility] MAIN OR       FAMILY HISTORY  Family History   Problem Relation Age of Onset    Breast Cancer Mother     Hypertension Mother     Other Father         DIVERTICULITIS    Crohn's Disease Sister     Lung Disease Brother         mesothioloma    Diabetes Sister     Heart Attack Father     Heart Disease Father     Stroke Mother        SOCIAL HISTORY  Social History     Tobacco Use    Smoking status: Former     Packs/day: 1.00     Years: 50.00     Pack years: 50.00     Types: Cigarettes     Quit date: 10/2/2011     Years since quittin.4    Smokeless tobacco: Current     Types: Chew    Tobacco comments:     Chews tobacco daily   Vaping Use    Vaping Use: Never used   Substance Use Topics    Alcohol use:  Yes     Alcohol/week: 0.0 standard drinks    Drug use: No       ALLERGIES  Allergies   Allergen Reactions    Acetaminophen Hives     Per spouse has small amount of hives, takes 1/2 and tolerates     Azithromycin Hives    Morphine Hallucinations     Muscle twitching. jerking    Oxaprozin Other (See Comments)     ELEVATED BLOOD PRESSURE    Oxycodone Anxiety       MEDICATIONS  Current Outpatient Medications on File Prior to Encounter   Medication Sig Dispense Refill    fluticasone-salmeterol (ADVAIR) 250-50 MCG/ACT AEPB diskus inhaler 1 inhalation twice daily, rinse mouth after use 60 each 11    fluticasone (FLONASE) 50 MCG/ACT nasal spray USE 1 OR 2 SPRAYS IN EACH NOSTRIL EVERY DAY. 3 each 3    carvedilol (COREG) 6.25 MG tablet Take 1 tablet by mouth 2 times daily (with meals) 60 tablet 1    ferrous sulfate (IRON 325) 325 (65 Fe) MG tablet Take 1 tablet by mouth daily (with breakfast) 30 tablet 3    montelukast (SINGULAIR) 10 MG tablet TAKE 1 TABLET BY MOUTH EVERY DAY AT BEDTIME 90 tablet 3    magnesium oxide (MAG-OX) 400 (240 Mg) MG tablet TAKE 1 TABLET BY MOUTH EVERY DAY 90 tablet 3    albuterol (PROVENTIL) (2.5 MG/3ML) 0.083% nebulizer solution 1 vial via nebulizer 4 times daily if needed for shortness of breath or wheezing. Diagnosis-COPD, J44.9. Bill to Medicare part B 360 mL 11    senna-docusate (PERICOLACE) 8.6-50 MG per tablet Take 1 tablet by mouth nightly      CPAP Machine MISC by Does not apply route      albuterol sulfate HFA (PROVENTIL;VENTOLIN;PROAIR) 108 (90 Base) MCG/ACT inhaler USE 2 PUFFS BY INHALATION 4 TIMES DAILY AS NEEDED FOR WHEEZING OR SHORTNESS OF BREATH. Patient prefers ventolin. 18 g 11    GUAIFENESIN 1200 PO Take 1 tablet by mouth every 12 hours as needed      acetaminophen (TYLENOL) 500 MG tablet Take 500 mg by mouth every 6 hours as needed for Pain Taking 1/2 two times daily      glipiZIDE (GLUCOTROL XL) 10 MG extended release tablet Take 1 tablet by mouth daily 30 tablet 0    furosemide (LASIX) 20 MG tablet Take 1 tablet by mouth in the morning. (Patient taking differently: Take 20 mg by mouth daily 1/2 tablet) 60 tablet 3    [DISCONTINUED] amLODIPine (NORVASC) 5 MG tablet Take 1 tablet by mouth in the morning. 30 tablet 3    [DISCONTINUED] QUEtiapine (SEROQUEL) 25 MG tablet Take 1 tablet by mouth in the morning.  60 tablet 3    rosuvastatin (CRESTOR) 10 MG tablet TAKE 1 TABLET BY MOUTH EVERY DAY (Patient taking differently: at bedtime) 90 tablet 0    amiodarone (CORDARONE) 200 MG tablet Take 200 mg by mouth daily      apixaban (ELIQUIS) 5 MG TABS tablet Take 5 mg by mouth every 12 hours      clopidogrel (PLAVIX) 75 MG tablet Take 75 mg by mouth daily      latanoprost (XALATAN) 0.005 % ophthalmic solution Apply 1 drop to eye nightly      nitroGLYCERIN (NITROSTAT) 0.4 MG SL tablet Place 0.4 mg under the tongue      [DISCONTINUED] metFORMIN (GLUCOPHAGE) 500 MG tablet TAKE TWO TABLETS BY MOUTH 2 TIMES A DAY       No current facility-administered medications on file prior to encounter. REVIEW OF SYSTEMS  The patient has no difficulty with chest pain or shortness of breath. No fever or chills. Comprehensive review of systems was otherwise unremarkable except as noted above. Objective:   Physical Exam:   Recent vitals (if inpt):  Patient Vitals for the past 24 hrs:   BP Temp Temp src Pulse Height Weight   03/06/23 1106 115/62 98.3 °F (36.8 °C) Temporal 60 -- --   03/06/23 1050 -- -- -- -- 6' 1\" (1.854 m) 237 lb (107.5 kg)         /62   Pulse 60   Temp 98.3 °F (36.8 °C) (Temporal)   Ht 6' 1\" (1.854 m)   Wt 237 lb (107.5 kg)   BMI 31.27 kg/m²   Wt Readings from Last 3 Encounters:   03/06/23 237 lb (107.5 kg)   02/27/23 238 lb (108 kg)   02/27/23 237 lb (107.5 kg)     Constitutional: Alert, oriented, cooperative patient in no acute distress; appears stated age;  General appearance is within normal limits for wound care patient population. See the today's recorded vitals signs and constitutional data. Eyes: Pupils equal; Sclera are clear. EOMs intact; The eyes appear to track and move normally. The sclera are not injected. The conjunctive are clear. The eyelids are normal. There is no scleral icterus. ENMT: There are no obvious external ear, nose, lip or mouth lesions. Nares normal; Neck: Overall contour of the neck is normal with no obvious neck masses. Gross hearing is within normal limits. No obvious neck masses  Resp:  Breathing is non-labored; normal rate and effort; no audible wheezing. CV: RRR;  no JVD;  No evidence of cyanosis of the upper extremities. The extremities are perfused without embolic sign, splinter hemorrhages, or petechia. GI: soft and non-distended without acute abnormality noted. Musculoskeletal: unremarkable with normal function. No embolic signs or cyanosis. Neuro:  Oriented; moves all 4; no focal deficits  Psychiatric: Judgement and insight are within normal limits for the wound care population of patients. Patient is oriented to person, place, and time. Recent and remote memory are within normal limits. Mood and affect are within normal limits. Integumentary (Skin/Wounds)  See inspection of wound(s) below. There are no other skin areas of palpable concern. See wound center documentation including photos in the [address] Wound Template made part of this record by reference. Wound Care Documentation:    Wound 09/09/22 Heel Left;Lateral (Active)   Wound Image   03/06/23 1107   Wound Etiology Diabetic Martinez 2 03/06/23 1107   Dressing Status Clean;Dry; Intact 03/06/23 1107   Wound Cleansed Soap and water;Cleansed with saline; Vashe 03/06/23 1107   Dressing/Treatment ABD;Gauze dressing/dressing sponge 03/06/23 1107   Offloading for Diabetic Foot Ulcers Offloading ordered 03/06/23 1107   Wound Length (cm) 0.6 cm 03/06/23 1107   Wound Width (cm) 0.2 cm 03/06/23 1107   Wound Depth (cm) 0.1 cm 03/06/23 1107   Wound Surface Area (cm^2) 0.12 cm^2 03/06/23 1107   Change in Wound Size % (l*w) 98 03/06/23 1107   Wound Volume (cm^3) 0.012 cm^3 03/06/23 1107   Wound Healing % 100 03/06/23 1107   Wound Assessment Pink/red 03/06/23 1107   Drainage Amount Small 03/06/23 1107   Drainage Description Serosanguinous 03/06/23 1107   Odor None 03/06/23 1107   Joanna-wound Assessment Intact 03/06/23 1107   Margins Attached edges 03/06/23 1107   Wound Thickness Description not for Pressure Injury Full thickness 03/06/23 1107   Number of days: 177       Wound 09/13/22 Buttocks moisture skin damage (Active) Number of days: 174        Initial WC visit 10/24/2022      F/U on 12/1/2022    Amount and/or Complexity of Data Reviewed and Analyzed:  I reviewed and analyzed all of the unique labs and radiologic studies that are shown below as well as any that are in the HPI, and any that are in the expanded problem list below  *Each unique test, order, or document contributes to the combination of 2 or combination of 3 in Category 1 below. I also independently reviewed radiology images for studies I described above in the HPI or studies I have ordered. For this visit I also reviewed old records and prior notes. No results for input(s): WBC, HGB, PLT, NA, K, CL, CO2, BUN, CREA, GLU, INR, APTT, ALT, AML, AML, LCAD, PCO2, PO2, HCO3 in the last 72 hours. Invalid input(s): PTP, TBIL, TBILI, CBIL, SGOT, GPT, AP, LPSE, NH4, TROPT, TROIQ,  PH    No results for input(s): INR, APTT, ALT, AML in the last 72 hours.     Invalid input(s): PTP, TBIL, TBILI, CBIL, SGOT, GPT, AP, LPSE      Most recent blood counts (CBC) review  Lab Results   Component Value Date    WBC 5.6 02/15/2023    HGB 10.0 (L) 02/15/2023    HCT 30.6 (L) 02/15/2023    MCV 95.9 02/15/2023     02/15/2023     Most recent chemistry (BMP) test review   Lab Results   Component Value Date/Time     02/15/2023 10:31 AM    K 3.6 02/15/2023 10:31 AM     02/15/2023 10:31 AM    CO2 31 02/15/2023 10:31 AM    BUN 8 02/15/2023 10:31 AM    CREATININE 0.80 02/15/2023 10:31 AM    GLUCOSE 119 02/15/2023 10:31 AM    CALCIUM 9.0 02/15/2023 10:31 AM      Most recent Liver enzyme test review  Lab Results   Component Value Date    ALT 30 02/15/2023    AST 15 02/15/2023    ALKPHOS 81 02/15/2023    BILITOT 0.6 02/15/2023     Most recent coagulation (coags) review  @lastinr@  Lab Results   Component Value Date/Time    INR 1.3 07/30/2022 09:53 AM    APTT 63.6 08/02/2022 05:41 AM    APTT 24.3 03/04/2022 03:57 PM       Diabetic assessment  Hemoglobin A1C   Date Value Ref Range Status   12/29/2022 5.6 4.8 - 5.6 % Final       Nutritional assessment screen to assess wound healing ability:  Lab Results   Component Value Date    LABALBU 3.2 02/15/2023     No results found for: TP, ALBR, ALB    Xray Result (most recent):  XR CHEST PORTABLE 02/06/2023    Narrative  EXAMINATION: XR CHEST PORTABLE    DATE: 2/6/2023 10:30 PM    INDICATION: ; Sepsis    COMPARISON:  December 20, 2022    FINDINGS:      Moderate right lower lung consolidative opacity is new. Minor background  interstitial opacities are redemonstrated. No visible pleural effusion or pneumothorax. Cardiomediastinal silhouette  unchanged. Cardiac device with leads in the right  atrium and partly imaged in the right ventricle. Impression  1. Right lower pneumonia. Recommend chest radiographic follow-up in eight weeks  for resolution. Luiz Bhardwaj M.D.  2/6/2023 11:37:00 PM    CT Result (most recent):  CT HEAD WO CONTRAST 12/29/2022    Narrative  CT HEAD WITHOUT CONTRAST, 12/29/2022    History: Altered mental status. Comparison: None    Technique:   5 mm axial scans from the skull base to the vertex. All CT scans  performed at this facility use one or all of the following: Automated exposure  control, adjustment of the mA and/or kVp according to patient's size, iterative  reconstruction. Findings:  No evidence of intracranial hemorrhage is seen. No abnormal  extra-axial fluid collections are seen. Age related chronic cortical volume  loss is seen. No evidence for acute hydrocephalus is seen. No evidence of  midline shift or herniation is seen. No abnormal edema pattern is seen in a  vascular distribution to suggest large artery infarction. Evaluation with bone windows shows no acute osseous changes. There is a  moderate right mastoid effusion with fluid entering the right middle ear. Impression  1. No acute intracranial process evident by noncontrast CT study of the head.     2.  Moderate right mastoid effusion with fluid entering the right middle ear. This can been indicator for right otomastoiditis. Recommend clinical  correlation. This report was made using voice transcription. Despite my best efforts to avoid  any, transcription errors may persist. If there is any question about the  accuracy of the report or need for clarification, then please call [phone], or text me through perfectserv for clarification or correction. 07/06/22    VAS DUP LOWER EXT ARTERIES BILATERAL W JOVANA 07/07/2022  7:57 AM (Final)    Interpretation Summary    Right lower extremity: The JOVANA (ankle-brachial index) is 0.78 and is abnormal. The lower extremity arterial duplex reveals an occlusion of the proximal superficial femoral artery with reconstitution at the distal proximal superficial femoral artery. There is monophasic flow in the GILBERTO, PTA and peroneal arteries at the ankle. The great toe pressure is 64mmHg. Left lower extremity: Right lower extremity: The JOVANA (ankle-brachial index) is 0.60 and is abnormal. The lower extremity arterial duplex reveals an occlusion of the proximal superficial femoral artery with reconstitution at the below knee popliteal artery. There is monophasic flow in the GILBERTO, PTA and peroneal arteries at the ankle. The great toe pressure is 63mmHg. The abdominal aorta was evaluated and measured 2.8cm x 2.9cm at its maximum diameter, no aneurysm visualized on limited evaluation of abdominal aorta.     Signed by: Marian Grey MD on 7/7/2022  7:57 AM        Admission date (for inpatients): 3/6/2023   Day of Surgery  * No procedures listed *    Procedure:  Skin Substitute Application    Performed by: Christa Menjivar MD  Ulcer Type: arterial and diabetic  Consent obtained: Yes  Time out taken: Yes    Product Utilized:  NuShield 1.6 sq/cm  Fenestrated at the time of procedure: Yes  Instrument(s) utilized during the procedure: #15 surgical blade    Skin Substitute was Applied to Ulcer Number(s):    Ulcer #: 1  Total Surface Area of Ulcer(s) Covered 0.12 sq/cm  Was the Product Layered  No   Amount of Product Applied 1.6 sq/cm   Amount of Product Wasted 0 sq/cm   Reason for Waste: N/A. Skin Substitute was surgically fixated and secured with Other: silicone dressing. Wound 09/09/22 Heel Left;Lateral (Active)   Wound Image   03/06/23 1107   Wound Etiology Diabetic Martinez 2 03/06/23 1107   Dressing Status Clean;Dry; Intact 03/06/23 1107   Wound Cleansed Soap and water;Cleansed with saline; Vashe 03/06/23 1107   Dressing/Treatment ABD;Gauze dressing/dressing sponge 03/06/23 1107   Offloading for Diabetic Foot Ulcers Offloading ordered 03/06/23 1107   Wound Length (cm) 0.6 cm 03/06/23 1107   Wound Width (cm) 0.2 cm 03/06/23 1107   Wound Depth (cm) 0.1 cm 03/06/23 1107   Wound Surface Area (cm^2) 0.12 cm^2 03/06/23 1107   Change in Wound Size % (l*w) 98 03/06/23 1107   Wound Volume (cm^3) 0.012 cm^3 03/06/23 1107   Wound Healing % 100 03/06/23 1107   Wound Assessment Pink/red 03/06/23 1107   Drainage Amount Small 03/06/23 1107   Drainage Description Serosanguinous 03/06/23 1107   Odor None 03/06/23 1107   Joanna-wound Assessment Intact 03/06/23 1107   Margins Attached edges 03/06/23 1107   Wound Thickness Description not for Pressure Injury Full thickness 03/06/23 1107   Number of days: 177       Wound 09/13/22 Buttocks moisture skin damage (Active)   Number of days: 174      Procedural Pain: 0/10   Post Procedural Pain: 0 / 10  Response to Treatment: Patient tolerated procedure well with no complaints of pain.       Assessment:      Problem List Items Addressed This Visit          Circulatory    Arterial degeneration    Relevant Orders    Initiate Outpatient Wound Care Protocol    PAD (peripheral artery disease) ([facility]    Relevant Orders    Initiate Outpatient Wound Care Protocol    DM (diabetes mellitus) type II, controlled, with peripheral vascular disorder ([facility]    Relevant Orders    Initiate Outpatient Wound Care Protocol       Endocrine    Diabetic neuropathy [facility])    Relevant Orders    Initiate Outpatient Wound Care Protocol    Ambulatory Referral to DME       Other    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 *  Resolved Problems:    * No resolved hospital problems.  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 ([address] 10/24/2022     Priority: High    Antiplatelet or antithrombotic long-term use 10/24/2022     Priority: High     Eliquis and Plavix      Chest pain 12/05/2018     Priority: High    Acute respiratory failure with hypoxia ([address] 02/07/2023     Priority: Medium    Hospital-acquired bacterial pneumonia 02/07/2023     Priority: Medium    COPD exacerbation ([address] 12/29/2022     Priority: Medium    Influenza 12/29/2022     Priority: Medium    Lumbar discitis 09/08/2022     Priority: Medium    Psoas muscle abscess ([address] 09/08/2022     Priority: Medium    Physical debility 08/26/2022     Priority: Medium    General weakness 07/27/2022     Priority: Medium    Acute cystitis without hematuria 07/27/2022     Priority: Medium    Low back pain without sciatica 07/27/2022     Priority: Medium    Back pain 07/27/2022     Priority: Medium    DNI (do not intubate) 07/27/2022     Priority: Medium    Elevated liver enzymes 07/27/2022     Priority: Medium    Anemia 07/27/2022     Priority: Medium    Hyponatremia 07/27/2022     Priority: Medium ICD (implantable cardioverter-defibrillator) in place 06/16/2022     Priority: Medium    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, vertebra(e), init ([facility] 07/07/2022     Priority: Low     Added automatically from request for surgery [id number]      Atrial fibrillation ([facility] 03/04/2022     Priority: Low    Ventricular tachycardia 03/04/2022     Priority: Low    VT (ventricular tachycardia) 03/04/2022     Priority: Low    Acute metabolic encephalopathy 91/12/0834     Priority: Low    Irregular heart beat 03/02/2021     Priority: Low    PVC's (premature ventricular contractions) 03/02/2021     Priority: Low    Elevated troponin 12/31/2020     Priority: Low    Toe laceration 12/09/2019     Priority: Low    Type 2 diabetes with nephropathy ([facility] 06/17/2019     Priority: Low    Hypoxia 02/08/2019     Priority: Low    Sepsis ([facility] 02/08/2019     Priority: Low    Abnormal nuclear cardiac imaging test 11/27/2018     Priority: Low    Cigarette nicotine dependence in remission 08/20/2018     Priority: Low    Chronic pain of right knee 12/14/2017     Priority: Low    Obstructive sleep apnea 08/11/2017     Priority: Low    Intermittent spinal claudication ([facility] 06/13/2017     Priority: Low    Pulmonary emphysema ([facility] 11/17/2016     Priority: Low    H/O endarterectomy 11/10/2015     Priority: Low    Complicated wound infection 11/10/2015     Priority: Low    Allergic rhinitis 11/10/2015     Priority: Low    Hiatal hernia 11/10/2015     Priority: Low    Diabetic neuropathy ([facility] 11/10/2015     Priority: Low    Osteoarthritis 11/10/2015     Priority: Low    Encounter for long-term (current) drug use 11/10/2015     Priority: Low    Benign neoplasm of colon 11/10/2015     Priority: Low    PAD (peripheral artery disease) ([facility] 11/10/2015     Priority: Low    Asthma 11/10/2015     Priority: Low    Orthostatic hypotension 11/10/2015     Priority: Low    Hyperlipidemia 11/10/2015     Priority: Low    S/P angioplasty with stent 11/10/2015     Priority: Low    COPD (chronic obstructive pulmonary disease) ([facility] 04/10/2015     Priority: Low    Arterial degeneration 11/05/2014     Priority: Low     11/6/14 (Dr Jim Khan) 1. Bleeding from right groin wound. 2. Disruption of right common femoral artery patch anastomosis and   possible arterial infection. Normocytic anemia 10/23/2014     Priority: Low    MINI (acute kidney injury) ([address] 09/15/2014     Priority: Low    DM (diabetes mellitus) type II, controlled, with peripheral vascular disorder ([facility] 09/11/2014     Priority: Low    Atherosclerosis of native arteries of extremity with intermittent claudication ([facility] 08/15/2014     Priority: Low     9/11/14 (Dr Jim Khan) Bilateral common femoral endarterectomies. Personal history of tobacco use, presenting hazards to health 02/12/2013     Priority: Low    Contact with and (suspected) exposure to asbestos 02/12/2013     Priority: Low    HTN (hypertension) 10/12/2011     Priority: Low    Severe obesity (BMI 35.0-39. 9) with comorbidity ([facility] 10/12/2011     Priority: Low    Sleep apnea 10/12/2011     Priority: Low    Ischemic cardiomyopathy 10/12/2011     Priority: Low    Coronary artery disease involving native coronary artery of native heart without angina pectoris 10/12/2011     Priority: Low           Plan:     Number and Complexity of Problems addressed and   Risks of complications and/or morbidity of management    Treatment Note please see attached Discharge Instructions  Any procedures done today in the wound center (if documented in a separate note) in [facility] are made part of this record by reference  Written patient dismissal instructions given to patient or POA.           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 He has had an ulcer on the left heel for several months. He has been debilitated following back surgery but is also debilitated from multiple medical problems including cardiac disease, peripheral vascular disease, and diabetes. He is anticoagulated on Eliquis and Plavix. He has had femoral endarterectomies in the past and is now followed by Dr. Cristi Segovia. JOVANA in July was abnormal and has not been reevaluated since deterioration of his tissue. He is scheduled for repeat JOVANA in January and we will consult vascular surgery for earlier evaluation. Vascular consultation to follow as needed. He has not been offloading despite having work boots. There is clearly a pressure component. There is no mirror lesion on the right heel and may reflect his discrepancy in blood supply. He does describe some episodes that could be rest pain though it does not drop his legs for improvement. This may also be limited by his debility. We will not do debridement today. We will dress with Betadine and an absorbent pad and follow-up in 1 week. Offloading was emphasized. He returns on 10/31/2022. He underwent arteriography but is not a candidate for percutaneous intervention. He is being set up for formal bypass in November. Therapy is having him walk on his foot and offloading was again stressed. Current dressing remains Betadine. I will see him back in 1.5 weeks which will be before his bypass surgery. He returns on 11/10/2022. He is improved and has completed antibiotics. Current dressing is Betadine. He will see vascular surgery on Monday to schedule bypass surgery. He returns on 12/1/2022.   He was seen by vascular surgery who have set him up for femoral to distal bypass in January, but feel it may not be necessary as he continues to heal.  This is probably secondary to better offloading. He was also seen by ID who also feel that the bypass should be avoided if possible since he is continuing to heal.  I agree with both specialists that he is continuing to heal and we will move forward with adjuncts to healing. We will start with skin substitutes. I will order PuraPly AM for next visit. This would be the best place to start when we cannot perform aggressive debridement due to his vascular status. We will then transition to a growth factor product. Returns on 12/8/2022. There continues to be some improvement. Still some issues with understanding offloading. PuraPly AM #1 was applied today and well-tolerated. We will order a graft for next week. He returns on 12/15/2022. There is some improvement. They have better understanding of offloading. PuraPly AM #2 was applied today and well-tolerated. He returns on 1/19/2023. He missed an appointment because he was hospitalized with bacteremia of unknown source. It was not from his foot. The foot wound has actually improved, likely from improved offloading while being hospitalized. PuraPly AM #3 was applied today and well-tolerated. He returns on 1/26/2023. Remains well and the wound continues to get smaller using PuraPly. Graft #4 was applied today and well-tolerated. He returns on 2/2/2023. The wound continues to become smaller and more epithelialized. PuraPly AM graft #5 was applied today and well-tolerated. We will order another graft for next week. He returns on 2/13/2023. He was actually hospitalized for pneumonia with hypoxia and missed that appointment. He was dressed with Aquacel Ag while he was in the hospital.  Wound appears slightly larger today which may be due to his hospitalization. We will transition from PuraPly AM to [facility] for his next appointment with me which will be in 2 weeks. He will return for a regular dressing change in 1 week. He returns on 2/27/2023.   He continues to do well and there is improvement in the size of the wound.  Jeancarlos  #1 was grafted today and well-tolerated.  We will order another graft for next week.  He returns on 3/6/2023.  There is continued improvement using skin substitute grafting.  Jeancarlos #2 was grafted today and well-tolerated.  He will come back next week for a dressing change.  We will order a another graft for 2 weeks.  I will see him back in 2 weeks.    Wound Care  Orders Placed This Encounter   Procedures    Initiate Outpatient Wound Care Protocol     Cleanse wound with saline    If wound contains bioburden or contamination cleanse with wound cleanser or antimicrobial solution     For normal periwound tissue without irritation nor maceration, apply topical skin protectant    For periwound tissue with irritation and/or maceration, apply zinc based product, topical steroid cream/ointment, or equivalent     For wounds with dry firm black eschar and/or without exudate, apply betadine and leave open to air      For wounds with scant/small to no exudate or drainage, apply wound gel, hydrocolloid, polymer, or equivalent and cover with secondary dressing/foam      For wounds with moderate/large exudate or drainage, apply alginate, hydrofiber, polymer, or equivalent and cover with secondary dressing/foam    For wounds with nonviable tissue requiring removal, apply chemical or mechanical debrider and cover with secondary dressing/foam    For wounds with tunneling, dead space, or cavity, fill or pack with strip/gauze/kerlex to fit and cover with secondary dressing/foam    For wounds with adequate granulation or epithelization, apply wound gel, hydrocolloid, polymer, collagen, or transparent film, and cover secondary dry dressing/foam    For wounds that need additional secondary dressing to help pad or control additional drainage/exudates, add foam, absorbent pad or hydrocolloid    For wounds with suspected or known infection, apply antimicrobial mesh and/or  antimicrobial alginate/hydrofiber, or antimicrobial solution moistened gauze/kerlex, or equivalent and cover with secondary dressing/foam    Compression Management needed for edema control, apply multilayer compression or tubular garment or equivalent    Offloading Management needed for pressure relief, apply offloading shoe/boot or equivalent     Standing Status:   Standing     Number of Occurrences:   1    Ambulatory Referral to DME     Referral Priority:   Routine     Referral Reason:   Specialty Services Required     Number of Visits Requested:   1       Return Appointment:   2 weeks with Radha Cruz MD  1 week for dressing change     Instructions: Left Heel:  Cleanse wound and periwound with wound cleanser or normal saline. Vashe Wound Solution (hypochlorous acid) soak placed on wound bed for a minimum of 15 seconds prior to dressing application. This solution removes pathogens, bioburden, and biofilms from wounds, but is not cytotoxic. Applied NuShield # 2 (ID#: [id number]; exp date: 7/05/2027)  Secured with mepilex transfer, dry gauze, Mepilex, ABD, rolled gauze  Follow with [facility] [facility]. Dressing change 1x weekly at [address]. Please leave lower layer of Mepilex covering graft in place & change the outside layers. Plan to apply NuShield #3 at next appointment. Offload pressure from heel through use of wheelchair and foam at night     Level of MDM (2/3 elements below)  Number and Complexity of Problems Addressed Amount and/or Complexity of Data to be Reviewed and Analyzed  *Each unique test, order, or document contributes to the combination of 2 or combination of 3 in Category 1 below. Risk of Complications and/or Morbidity or Mortality of pt Management     70003  45268 SF Minimal  ?1self-limited or minor problem Minimal or none Minimal risk of morbidity from additional diagnostic testing or Rx   37719  01994 Low Low  ? 2or more self-limited or minor problems;    or  ? 1stable chronic illness;    or  ?1acute, uncomplicated illness or injury   Limited  (Must meet the requirements of at least 1 of the 2 categories)  Category 1: Tests and documents   ? Any combination of 2 from the following:  ?Review of prior external note(s) from each unique source*;  ?review of the result(s) of each unique test*;   ?ordering of each unique test*    or   Category 2: Assessment requiring an independent historian(s)  (For the categories of independent interpretation of tests and discussion of management or test interpretation, see moderate or high) Low risk of morbidity from additional diagnostic testing or treatment     67077  02927 Mod Moderate  ? 1or more chronic illnesses with exacerbation, progression, or side effects of treatment;    or  ?2or more stable chronic illnesses;    or  ?1undiagnosed new problem with uncertain prognosis;    or  ?1acute illness with systemic symptoms;    or  ?1acute complicated injury   Moderate  (Must meet the requirements of at least 1 out of 3 categories)  Category 1: Tests, documents, or independent historian(s)  ? Any combination of 3 from the following:   ?Review of prior external note(s) from each unique source*;  ?Review of the result(s) of each unique test*;  ?Ordering of each unique test*;  ?Assessment requiring an independent historian(s)    or  Category 2: Independent interpretation of tests   ? Independent interpretation of a test performed by another physician/other qualified health care professional (not separately reported);     or  Category 3: Discussion of management or test interpretation  ? Discussion of management or test interpretation with external physician/other qualified health care professional/appropriate source (not separately reported)   Moderate risk of morbidity from additional diagnostic testing or treatment  Examples only:  ?Prescription drug management - advanced wound care prescription Rx wound care products  (eg Iodosorb, hydrofera, silvadene, collagen, puracol plus, optiloc, biologics - placenta, Theraskin, Apligraf). Note also that if home health care is involved, they will not apply topical therapies without a prescription/order from physician      ? Decision regarding minor surgery with identified patient or procedure risk factors  ? Decision regarding elective major surgery without identified patient or procedure risk factors   ? Diagnosis or treatment significantly limited by social determinants of health       64105  35439 High High  ? 1or more chronic illnesses with severe exacerbation, progression, or side effects of treatment;    or  ?1 acute or chronic illness or injury that poses a threat to life or bodily function   Extensive  (Must meet the requirements of at least 2 out of 3 categories)  Category 1: Tests, documents, or independent historian(s)  ? Any combination of 3 from the following:   ?Review of prior external note(s) from each unique source*;  ?Review of the result(s) of each unique test*;   ?Ordering of each unique test*;   ?Assessment requiring an independent historian(s)    or   Category 2: Independent interpretation of tests   ? Independent interpretation of a test performed by another physician/other qualified health care professional (not separately reported);     or  Category 3: Discussion of management or test interpretation  ? Discussion of management or test interpretation with external physician/other qualified health care professional/appropriate source (not separately reported)   High risk of morbidity from additional diagnostic testing or treatment  Examples only:  ?Drug therapy requiring intensive monitoring for toxicity  ? Decision regarding elective major surgery with identified patient or procedure risk factors  ? Decision regarding emergency major surgery  ? Decision regarding hospitalization  ? Decision not to resuscitate or to de-escalate care because of poor prognosis                 I have personally performed a face-to-face diagnostic evaluation and management  service on this patient. I have independently seen the patient. I have independently obtained the above history from the patient/family. I have independently examined the patient with above findings. I have independently reviewed data/labs for this patient and developed the above plan of care (MDM).       Electronically signed by Zane Brennan MD on 3/6/2023 at 12:15 PM

## 2023-03-06 NOTE — WOUND CARE
NuShield Treatment Note    NAME:  Carolina Petersen  YOB: 1945  MEDICAL RECORD NUMBER:  [id number]  DATE:  3/6/2023    Goal:  Patient will receive safe and proper application of skin substitute. Patient will comply with caring for dressing, offloading and reporting complications. Expiration date checked immediately prior to use. Package intact prior to use and no damage noted. Nushield was removed from protective sterile packaging by provider and applied to prepared ulcer bed. NuShield applied with notch to Top Upper Right Corner. NuShield was hydrated with sterile normal saline per provider. NuShield was applied to left lateral heel and affixed with steri-strips by the provider. Applied nonadherent and Foam (Secondary Dressing)   Coban or ace wrap was applied to secure graft and decrease edema. Patient/caregiver was instructed not to remove dressing and to keep it clean and dry. NuShield may be applied a total of 10 times per wound over a 12 week period. Additionally NuShield may only be used every 12 months per wound. Date of first application of NuShield for this current wound is February 27, 2023.       Application # 2 out of 10           Guidelines followed    Electronically signed by Desiree Joyce RN on 3/6/2023 at 11:32 AM

## 2023-03-06 NOTE — FLOWSHEET NOTE
03/06/23 1107   Wound 09/09/22 Heel Left;Lateral   Date First Assessed/Time First Assessed: 09/09/22 1535   Present on Hospital Admission: Yes  Primary Wound Type: Diabetic Ulcer  Location: Heel  Wound Location Orientation: Left;Lateral   Wound Image    Wound Etiology Diabetic Martinez 2   Dressing Status Clean;Dry;Intact   Wound Cleansed Soap and water;Cleansed with saline;Vashe   Dressing/Treatment ABD;Gauze dressing/dressing sponge  ([facility]ep[facility]   Offloading for Diabetic Foot Ulcers Offloading ordered   Wound Length (cm) 0.6 cm   Wound Width (cm) 0.2 cm   Wound Depth (cm) 0.1 cm   Wound Surface Area (cm^2) 0.12 cm^2   Change in Wound Size % (l*w) 98   Wound Volume (cm^3) 0.012 cm^3   Wound Healing % 100   Wound Assessment Pink/red   Drainage Amount Small   Drainage Description Serosanguinous   Odor None   Joanna-wound Assessment Intact   Margins Attached edges   Wound Thickness Description not for Pressure Injury Full thickness

## 2023-03-06 NOTE — PROGRESS NOTES
3/6/2023 7:04 PM  Location:[facility] PHYSICIAN SERVICES  [facility] [place]NAL MEDICINE  [place]  Patient #:  [id number]  YOB: 1945            History of Present Illness     Chief Complaint   Patient presents with    Follow-up     3 month f/u    Urinary Retention     Complains with urinary retention    Skin Exam     Has a spot on his head - is seeing the Dermatologist to have it removed.        Mr. Guillen is a 77 y.o. male  who presents for the above.   Notes that he has improved as far as his heel wound and breathing are concerned.  Doing better emotionally as well.  Is participating in PT.  Hopes to walk more in the near future.         Allergies   Allergen Reactions    Acetaminophen Hives     Per spouse has small amount of hives, takes 1/2 and tolerates     Azithromycin Hives    Morphine Hallucinations     Muscle twitching. jerking    Oxaprozin Other (See Comments)     ELEVATED BLOOD PRESSURE    Oxycodone Anxiety     Past Medical History:   Diagnosis Date    Acute respiratory failure with hypoxia ([facility]) 10/23/2014    MINI (acute kidney injury) ([facility]) 09/15/2014    MINI (acute kidney injury) ([facility])     MINI (acute kidney injury) ([facility])     Allergic rhinitis 11/10/2015    Asthma 11/10/2015    Benign essential hypertension 11/10/2015    Benign neoplasm of colon 11/10/2015    CAD (coronary artery disease) 11/10/2015    CAD (coronary artery disease) 1998, 1999    mix2, 3 stents [id number]    CAP (community acquired pneumonia) 12/31/2020    Cardiomyopathy ([facility]) 11/10/2015    Complicated wound infection 11/10/2015    COPD (chronic obstructive pulmonary disease) with emphysema ([facility]) 11/10/2015    COPD exacerbation ([facility]) 10/12/2011    COVID-19 12/31/2020    Diabetes mellitus type 2, controlled ([facility]) 11/10/2015    doesn/t check daily oral meds, A1c 6.0 (8/10/22)    Diabetic neuropathy ([facility]) 11/10/2015    Disruption of wound, unspecified 10/09/2014    Encounter for long-term (current) use of other high-risk medications  11/10/2015    Extremity atherosclerosis with intermittent claudication ([facility] 11/10/2015    H/O endarterectomy 11/10/2015    Hiatal hernia 11/10/2015    History of 2019 novel coronavirus disease (COVID-19)     2020- hospitalized    Hyperglycemia due to type 1 diabetes mellitus ([facility] 11/10/2015    Hyperlipidemia 11/10/2015    ICD (implantable cardioverter-defibrillator) in place 2022    Monomorphic ventricular tachycardia 2020    Myocardial infarction [facility]) 1999    Myocardial infarction ([facility] 11/10/2015    Obesity 11/10/2015    Orthostatic hypotension 11/10/2015    Osteoarthritis 11/10/2015    Peripheral vascular disease ([facility] 11/10/2015    PNA (pneumonia) 2019    PVC (premature ventricular contraction)     Rash     Seroma complicating a procedure 10/02/2014    Sleep apnea     cpap    Toe laceration     Type 2 diabetes with nephropathy ([facility]     Uncontrolled type 2 diabetes mellitus 11/10/2015    Vertiginous syndromes and other disorders of vestibular system 11/10/2015     Social History     Socioeconomic History    Marital status:      Spouse name: None    Number of children: None    Years of education: None    Highest education level: None   Tobacco Use    Smoking status: Former     Packs/day: 1.00     Years: 50.00     Pack years: 50.00     Types: Cigarettes     Quit date: 10/2/2011     Years since quittin.4    Smokeless tobacco: Current     Types: Chew    Tobacco comments:     Chews tobacco daily   Vaping Use    Vaping Use: Never used   Substance and Sexual Activity    Alcohol use:  Yes     Alcohol/week: 0.0 standard drinks    Drug use: No   Social History Narrative    Lives with wife     Social Determinants of Health     Financial Resource Strain: Low Risk     Difficulty of Paying Living Expenses: Not hard at all   Food Insecurity: No Food Insecurity    Worried About [address] in the Last Year: Never true    [address] in the Last Year: Never true Transportation Needs: Unknown    Lack of Transportation (Non-Medical): No   Housing Stability: Unknown    Unstable Housing in the Last Year: No     Past Surgical History:   Procedure Laterality Date    CARDIAC CATHETERIZATION  12/05/2018    LV EF=40%. LM:nl. LAD:30-40% mid stenosis. LCX OM1:95% stenosis. RCA:100% occlusion at RV branch. CATARACT REMOVAL Right 07/20/2022    CORONARY ANGIOPLASTY WITH STENT PLACEMENT  12/05/2018    LCX OM1:2.5 x 12 Giuseppe RAKESH    CORONARY ANGIOPLASTY WITH STENT PLACEMENT  1999    PA UNLISTED PROCEDURE ABDOMEN PERITONEUM & OMENTUM  1960    abdominal cyst removed     [address]    stents x3    SPINE SURGERY N/A 07/19/2022    LUMBAR 2, LUMBAR 4 KYPHOPLASTY AND ANY OTHER INDICATED LEVELS performed by Dylon Stafford MD at [address] [place] [address]  11/5/14    Repair of right common femoral artery     VASCULAR SURGERY  9/11/14    tiffanie femoral endarterectomy    VASCULAR SURGERY Left 10/28/2022    LEFT LOWER EXTREMITY ARTERIOGRAM / ULTRASOUND GUIDED ACCESS   performed by Matthieu Velazquez MD at [facility] MAIN OR     Current Outpatient Medications   Medication Sig Dispense Refill    clopidogrel (PLAVIX) 75 MG tablet TAKE 1 TABLET BY MOUTH EVERY DAY 90 tablet 2    furosemide (LASIX) 20 MG tablet Take 1 tablet by mouth daily 30 tablet 6    rosuvastatin (CRESTOR) 10 MG tablet TAKE 1 TABLET BY MOUTH EVERY DAY 90 tablet 3    apixaban (ELIQUIS) 5 MG TABS tablet Take 1 tablet by mouth in the morning and 1 tablet in the evening. 180 tablet 3    amiodarone (CORDARONE) 200 MG tablet Take 1 tablet by mouth daily 90 tablet 3    tamsulosin (FLOMAX) 0.4 MG capsule Take 1 capsule by mouth daily 90 capsule 1    fluticasone-salmeterol (ADVAIR) 250-50 MCG/ACT AEPB diskus inhaler 1 inhalation twice daily, rinse mouth after use 60 each 11    fluticasone (FLONASE) 50 MCG/ACT nasal spray USE 1 OR 2 SPRAYS IN EACH NOSTRIL EVERY DAY.  3 each 3    carvedilol (COREG) 6.25 MG tablet Take 1 tablet by mouth 2 times daily (with meals) 60 tablet 1    ferrous sulfate (IRON 325) 325 (65 Fe) MG tablet Take 1 tablet by mouth daily (with breakfast) 30 tablet 3    montelukast (SINGULAIR) 10 MG tablet TAKE 1 TABLET BY MOUTH EVERY DAY AT BEDTIME 90 tablet 3    magnesium oxide (MAG-OX) 400 (240 Mg) MG tablet TAKE 1 TABLET BY MOUTH EVERY DAY 90 tablet 3    albuterol (PROVENTIL) (2.5 MG/3ML) 0.083% nebulizer solution 1 vial via nebulizer 4 times daily if needed for shortness of breath or wheezing. Diagnosis-COPD, J44.9. Bill to Medicare part B 360 mL 11    senna-docusate (PERICOLACE) 8.6-50 MG per tablet Take 1 tablet by mouth nightly      CPAP Machine MISC by Does not apply route      albuterol sulfate HFA (PROVENTIL;VENTOLIN;PROAIR) 108 (90 Base) MCG/ACT inhaler USE 2 PUFFS BY INHALATION 4 TIMES DAILY AS NEEDED FOR WHEEZING OR SHORTNESS OF BREATH. Patient prefers ventolin. 18 g 11    acetaminophen (TYLENOL) 500 MG tablet Take 500 mg by mouth every 6 hours as needed for Pain Taking 1/2 two times daily      glipiZIDE (GLUCOTROL XL) 10 MG extended release tablet Take 1 tablet by mouth daily 30 tablet 0    latanoprost (XALATAN) 0.005 % ophthalmic solution Apply 1 drop to eye nightly      nitroGLYCERIN (NITROSTAT) 0.4 MG SL tablet Place 0.4 mg under the tongue      GUAIFENESIN 1200 PO Take 1 tablet by mouth every 12 hours as needed (Patient not taking: Reported on 3/6/2023)       No current facility-administered medications for this visit.      Health Maintenance   Topic Date Due    DTaP/Tdap/Td vaccine (1 - Tdap) Never done    Low dose CT lung screening  Never done    Pneumococcal 65+ years Vaccine (3 - PPSV23 if available, else PCV20) 10/17/2016    COVID-19 Vaccine (3 - Booster for Moderna series) 06/10/2021    Diabetic retinal exam  05/18/2022    Diabetic foot exam  09/21/2022    Lipids  03/05/2023    Annual Wellness Visit (AWV)  03/24/2023    Flu vaccine (1) 03/06/2024 (Originally 8/1/2022) Prostate Specific Antigen (PSA) Screening or Monitoring  05/18/2023    A1C test (Diabetic or Prediabetic)  06/29/2023    Depression Screen  03/06/2024    Shingles vaccine  Completed    Hepatitis C screen  Completed    Hepatitis A vaccine  Aged Out    Hib vaccine  Aged Out    Meningococcal (ACWY) vaccine  Aged Out     Family History   Problem Relation Age of Onset    Breast Cancer Mother     Hypertension Mother     Other Father         DIVERTICULITIS    Crohn's Disease Sister     Lung Disease Brother         mesothioloma    Diabetes Sister     Heart Attack Father     Heart Disease Father     Stroke Mother              Review of Systems  Review of Systems   Constitutional:  Negative for chills and fever.   Respiratory:  Positive for cough. Negative for shortness of breath and wheezing.    Cardiovascular:  Negative for chest pain and palpitations.   Gastrointestinal:  Negative for constipation, diarrhea, nausea and vomiting.   Genitourinary:  Positive for difficulty urinating.   Musculoskeletal:  Negative for arthralgias.   Skin:  Positive for wound.     BP (!) 107/53 (Site: Right Upper Arm, Position: Sitting, Cuff Size: Large Adult)   Pulse 60   Resp 18   Ht 6' 1\" (1.854 m)   Wt 237 lb (107.5 kg)   BMI 31.27 kg/m²       Physical Exam    Physical Exam  Constitutional:       General: He is not in acute distress.     Appearance: Normal appearance.      Comments: Pale; stooped forward in a wheelchair   HENT:      Head: Normocephalic and atraumatic.   Eyes:      Comments: Pale conjunctiva   Neck:      Vascular: No carotid bruit.   Cardiovascular:      Rate and Rhythm: Normal rate and regular rhythm.   Pulmonary:      Effort: Pulmonary effort is normal.      Breath sounds: Wheezing (faint and scattered) present.   Abdominal:      General: Abdomen is flat. There is no distension.      Palpations: Abdomen is soft.      Tenderness: There is no abdominal tenderness.   Musculoskeletal:      Right lower leg: No edema.       Left lower leg: No edema. Neurological:      General: No focal deficit present. Mental Status: He is alert and oriented to person, place, and time. Psychiatric:         Behavior: Behavior normal.      Comments: Smiling and joking         Assessment & Plan    Current Outpatient Medications   Medication Sig Dispense Refill    clopidogrel (PLAVIX) 75 MG tablet TAKE 1 TABLET BY MOUTH EVERY DAY 90 tablet 2    furosemide (LASIX) 20 MG tablet Take 1 tablet by mouth daily 30 tablet 6    rosuvastatin (CRESTOR) 10 MG tablet TAKE 1 TABLET BY MOUTH EVERY DAY 90 tablet 3    apixaban (ELIQUIS) 5 MG TABS tablet Take 1 tablet by mouth in the morning and 1 tablet in the evening. 180 tablet 3    amiodarone (CORDARONE) 200 MG tablet Take 1 tablet by mouth daily 90 tablet 3    tamsulosin (FLOMAX) 0.4 MG capsule Take 1 capsule by mouth daily 90 capsule 1    fluticasone-salmeterol (ADVAIR) 250-50 MCG/ACT AEPB diskus inhaler 1 inhalation twice daily, rinse mouth after use 60 each 11    fluticasone (FLONASE) 50 MCG/ACT nasal spray USE 1 OR 2 SPRAYS IN EACH NOSTRIL EVERY DAY. 3 each 3    carvedilol (COREG) 6.25 MG tablet Take 1 tablet by mouth 2 times daily (with meals) 60 tablet 1    ferrous sulfate (IRON 325) 325 (65 Fe) MG tablet Take 1 tablet by mouth daily (with breakfast) 30 tablet 3    montelukast (SINGULAIR) 10 MG tablet TAKE 1 TABLET BY MOUTH EVERY DAY AT BEDTIME 90 tablet 3    magnesium oxide (MAG-OX) 400 (240 Mg) MG tablet TAKE 1 TABLET BY MOUTH EVERY DAY 90 tablet 3    albuterol (PROVENTIL) (2.5 MG/3ML) 0.083% nebulizer solution 1 vial via nebulizer 4 times daily if needed for shortness of breath or wheezing. Diagnosis-COPD, J44.9.   Bill to Medicare part B 360 mL 11    senna-docusate (PERICOLACE) 8.6-50 MG per tablet Take 1 tablet by mouth nightly      CPAP Machine MISC by Does not apply route      albuterol sulfate HFA (PROVENTIL;VENTOLIN;PROAIR) 108 (90 Base) MCG/ACT inhaler USE 2 PUFFS BY INHALATION 4 TIMES DAILY AS NEEDED FOR WHEEZING OR SHORTNESS OF BREATH. Patient prefers ventolin. 18 g 11    acetaminophen (TYLENOL) 500 MG tablet Take 500 mg by mouth every 6 hours as needed for Pain Taking 1/2 two times daily      glipiZIDE (GLUCOTROL XL) 10 MG extended release tablet Take 1 tablet by mouth daily 30 tablet 0    latanoprost (XALATAN) 0.005 % ophthalmic solution Apply 1 drop to eye nightly      nitroGLYCERIN (NITROSTAT) 0.4 MG SL tablet Place 0.4 mg under the tongue      GUAIFENESIN 1200 PO Take 1 tablet by mouth every 12 hours as needed (Patient not taking: Reported on 3/6/2023)       No current facility-administered medications for this visit. Orders Placed This Encounter   Procedures    Lipid Panel     Standing Status:   Future     Standing Expiration Date:   3/6/2024    Comprehensive Metabolic Panel     Standing Status:   Future     Standing Expiration Date:   3/6/2024    Hemoglobin A1C     Standing Status:   Future     Standing Expiration Date:   3/6/2024         Orders Placed This Encounter   Medications    furosemide (LASIX) 20 MG tablet     Sig: Take 1 tablet by mouth daily     Dispense:  30 tablet     Refill:  6    rosuvastatin (CRESTOR) 10 MG tablet     Sig: TAKE 1 TABLET BY MOUTH EVERY DAY     Dispense:  90 tablet     Refill:  3    apixaban (ELIQUIS) 5 MG TABS tablet     Sig: Take 1 tablet by mouth in the morning and 1 tablet in the evening. Dispense:  180 tablet     Refill:  3    amiodarone (CORDARONE) 200 MG tablet     Sig: Take 1 tablet by mouth daily     Dispense:  90 tablet     Refill:  3    tamsulosin (FLOMAX) 0.4 MG capsule     Sig: Take 1 capsule by mouth daily     Dispense:  90 capsule     Refill:  1       Medications Discontinued During This Encounter   Medication Reason    amiodarone (CORDARONE) 200 MG tablet REORDER    apixaban (ELIQUIS) 5 MG TABS tablet REORDER    rosuvastatin (CRESTOR) 10 MG tablet REORDER    furosemide (LASIX) 20 MG tablet REORDER        Diagnosis Orders   1.  Paroxysmal atrial fibrillation ([facility]        2. 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 Type 2 diabetes with nephropathy (HCC)  Lipid Panel    Comprehensive Metabolic Panel    Hemoglobin A1C      4. Pulmonary emphysema, unspecified emphysema type ([facility]        5. BPH with obstruction/lower urinary tract symptoms           Is doing fairly well physically and emotionally. Vitals look good. Denies symptoms suggestive of hypotension. Has good control of lung issue. Urged him to see the dentist to prevent pneumonia as well as to stop chewing tobacco.  Most recent labs reviewed. Try the above for urinary symptoms. Knows to keep a low threshold for contacting the office with worsening symptoms. Follow up as documented or earlier as needed. Will obtain labs in 3 months. Return in about 6 months (around 9/6/2023).           Elen Albarado MD

## 2023-03-06 NOTE — DISCHARGE INSTRUCTIONS
Discharge Instructions for  Windy Fairchild  [address] [place] [address]  [address]  Phone [phone]   Fax [phone]        NAME:  Tamera Burger  YOB: 1945  MEDICAL RECORD NUMBER:  [id number]  DATE:  3/6/2023     Return Appointment:   2 weeks with Sloane Arredondo MD  1 week for dressing change     Instructions: Left Heel:  Cleanse wound and periwound with wound cleanser or normal saline. Vashe Wound Solution (hypochlorous acid) soak placed on wound bed for a minimum of 15 seconds prior to dressing application. This solution removes pathogens, bioburden, and biofilms from wounds, but is not cytotoxic. Applied NuShield # 2 (ID#: [id number]; exp date: 7/05/2027)  Secured with mepilex transfer, dry gauze, Mepilex, ABD, rolled gauze  Follow with [facility]. Dressing change 1x weekly at [address] [facility]. Please leave lower layer of Mepilex covering graft in place & change the outside layers. Plan to apply NuShield #3 at next appointment. Offload pressure from heel through use of wheelchair and foam at night      Should you experience increased redness, swelling, pain, foul odor, size of wound(s), or have a temperature over 101 degrees please contact the [address] [place] [address] Road at [phone] or if after hours contact your primary care physician or go to the hospital emergency department. PLEASE NOTE: IF YOU ARE UNABLE TO OBTAIN WOUND SUPPLIES, CONTINUE TO USE THE SUPPLIES YOU HAVE AVAILABLE UNTIL YOU ARE ABLE TO REACH US. IT IS MOST IMPORTANT TO KEEP THE WOUND COVERED AT ALL TIMES.      Electronically signed Gracia Velazquez RN, [facility] on 3/6/2023 at 11:25 AM

## 2023-03-13 ENCOUNTER — HOSPITAL ENCOUNTER (OUTPATIENT)
Dept: WOUND CARE | Age: 78
Discharge: HOME | End: 2023-03-13
Payer: MEDICARE

## 2023-03-13 VITALS
DIASTOLIC BLOOD PRESSURE: 71 MMHG | WEIGHT: 238 LBS | BODY MASS INDEX: 31.54 KG/M2 | HEART RATE: 63 BPM | SYSTOLIC BLOOD PRESSURE: 144 MMHG | HEIGHT: 73 IN | RESPIRATION RATE: 18 BRPM | TEMPERATURE: 97.5 F

## 2023-03-13 DIAGNOSIS — E11.42 DIABETIC POLYNEUROPATHY ASSOCIATED WITH TYPE 2 DIABETES MELLITUS (HCC): ICD-10-CM

## 2023-03-13 DIAGNOSIS — I70.90: ICD-10-CM

## 2023-03-13 DIAGNOSIS — R53.81 PHYSICAL DEBILITY: ICD-10-CM

## 2023-03-13 DIAGNOSIS — E11.51 DM (DIABETES MELLITUS) TYPE II, CONTROLLED, WITH PERIPHERAL VASCULAR DISORDER (HCC): ICD-10-CM

## 2023-03-13 DIAGNOSIS — Z79.02 ANTIPLATELET OR ANTITHROMBOTIC LONG-TERM USE: ICD-10-CM

## 2023-03-13 DIAGNOSIS — Z98.890 H/O ENDARTERECTOMY: ICD-10-CM

## 2023-03-13 DIAGNOSIS — I73.9 PAD (PERIPHERAL ARTERY DISEASE) (HCC): ICD-10-CM

## 2023-03-13 DIAGNOSIS — L97.422 NON-PRESSURE CHRONIC ULCER OF LEFT HEEL AND MIDFOOT WITH FAT LAYER EXPOSED (HCC): Primary | ICD-10-CM

## 2023-03-13 PROCEDURE — 99212 OFFICE O/P EST SF 10 MIN: CPT

## 2023-03-13 RX ORDER — GENTAMICIN SULFATE 1 MG/G
OINTMENT TOPICAL ONCE
OUTPATIENT
Start: 2023-03-13 | End: 2023-03-13

## 2023-03-13 RX ORDER — LIDOCAINE HYDROCHLORIDE 20 MG/ML
JELLY TOPICAL ONCE
OUTPATIENT
Start: 2023-03-13 | End: 2023-03-13

## 2023-03-13 RX ORDER — GINSENG 100 MG
CAPSULE ORAL ONCE
OUTPATIENT
Start: 2023-03-13 | End: 2023-03-13

## 2023-03-13 RX ORDER — LIDOCAINE 50 MG/G
OINTMENT TOPICAL ONCE
OUTPATIENT
Start: 2023-03-13 | End: 2023-03-13

## 2023-03-13 RX ORDER — BACITRACIN ZINC AND POLYMYXIN B SULFATE 500; 1000 [USP'U]/G; [USP'U]/G
OINTMENT TOPICAL ONCE
OUTPATIENT
Start: 2023-03-13 | End: 2023-03-13

## 2023-03-13 RX ORDER — BACITRACIN, NEOMYCIN, POLYMYXIN B 400; 3.5; 5 [USP'U]/G; MG/G; [USP'U]/G
OINTMENT TOPICAL ONCE
OUTPATIENT
Start: 2023-03-13 | End: 2023-03-13

## 2023-03-13 RX ORDER — LIDOCAINE 40 MG/G
CREAM TOPICAL ONCE
OUTPATIENT
Start: 2023-03-13 | End: 2023-03-13

## 2023-03-13 RX ORDER — LIDOCAINE HYDROCHLORIDE 40 MG/ML
SOLUTION TOPICAL ONCE
OUTPATIENT
Start: 2023-03-13 | End: 2023-03-13

## 2023-03-13 RX ORDER — BETAMETHASONE DIPROPIONATE 0.05 %
OINTMENT (GRAM) TOPICAL ONCE
OUTPATIENT
Start: 2023-03-13 | End: 2023-03-13

## 2023-03-13 RX ORDER — CLOBETASOL PROPIONATE 0.5 MG/G
OINTMENT TOPICAL ONCE
OUTPATIENT
Start: 2023-03-13 | End: 2023-03-13

## 2023-03-20 ENCOUNTER — HOSPITAL ENCOUNTER (OUTPATIENT)
Dept: WOUND CARE | Age: 78
Discharge: HOME | End: 2023-03-20
Payer: MEDICARE

## 2023-03-20 VITALS
SYSTOLIC BLOOD PRESSURE: 126 MMHG | RESPIRATION RATE: 18 BRPM | HEART RATE: 61 BPM | DIASTOLIC BLOOD PRESSURE: 64 MMHG | BODY MASS INDEX: 31.54 KG/M2 | OXYGEN SATURATION: 98 % | WEIGHT: 238 LBS | HEIGHT: 73 IN | TEMPERATURE: 98.3 F

## 2023-03-20 DIAGNOSIS — L97.422 NON-PRESSURE CHRONIC ULCER OF LEFT HEEL AND MIDFOOT WITH FAT LAYER EXPOSED (HCC): Primary | ICD-10-CM

## 2023-03-20 DIAGNOSIS — Z98.890 H/O ENDARTERECTOMY: ICD-10-CM

## 2023-03-20 DIAGNOSIS — Z79.02 ANTIPLATELET OR ANTITHROMBOTIC LONG-TERM USE: ICD-10-CM

## 2023-03-20 DIAGNOSIS — I70.90: ICD-10-CM

## 2023-03-20 DIAGNOSIS — I73.9 PAD (PERIPHERAL ARTERY DISEASE) (HCC): ICD-10-CM

## 2023-03-20 DIAGNOSIS — F17.211 CIGARETTE NICOTINE DEPENDENCE IN REMISSION: ICD-10-CM

## 2023-03-20 DIAGNOSIS — E11.51 DM (DIABETES MELLITUS) TYPE II, CONTROLLED, WITH PERIPHERAL VASCULAR DISORDER (HCC): ICD-10-CM

## 2023-03-20 DIAGNOSIS — R53.81 PHYSICAL DEBILITY: ICD-10-CM

## 2023-03-20 DIAGNOSIS — E11.42 DIABETIC POLYNEUROPATHY ASSOCIATED WITH TYPE 2 DIABETES MELLITUS (HCC): ICD-10-CM

## 2023-03-20 PROCEDURE — 99214 OFFICE O/P EST MOD 30 MIN: CPT | Performed by: SURGERY

## 2023-03-20 PROCEDURE — 99212 OFFICE O/P EST SF 10 MIN: CPT

## 2023-03-20 RX ORDER — GENTAMICIN SULFATE 1 MG/G
OINTMENT TOPICAL ONCE
OUTPATIENT
Start: 2023-03-20 | End: 2023-03-20

## 2023-03-20 RX ORDER — CLOBETASOL PROPIONATE 0.5 MG/G
OINTMENT TOPICAL ONCE
OUTPATIENT
Start: 2023-03-20 | End: 2023-03-20

## 2023-03-20 RX ORDER — BACITRACIN ZINC AND POLYMYXIN B SULFATE 500; 1000 [USP'U]/G; [USP'U]/G
OINTMENT TOPICAL ONCE
OUTPATIENT
Start: 2023-03-20 | End: 2023-03-20

## 2023-03-20 RX ORDER — LIDOCAINE HYDROCHLORIDE 20 MG/ML
JELLY TOPICAL ONCE
OUTPATIENT
Start: 2023-03-20 | End: 2023-03-20

## 2023-03-20 RX ORDER — LIDOCAINE 40 MG/G
CREAM TOPICAL ONCE
OUTPATIENT
Start: 2023-03-20 | End: 2023-03-20

## 2023-03-20 RX ORDER — LIDOCAINE 50 MG/G
OINTMENT TOPICAL ONCE
OUTPATIENT
Start: 2023-03-20 | End: 2023-03-20

## 2023-03-20 RX ORDER — BACITRACIN, NEOMYCIN, POLYMYXIN B 400; 3.5; 5 [USP'U]/G; MG/G; [USP'U]/G
OINTMENT TOPICAL ONCE
OUTPATIENT
Start: 2023-03-20 | End: 2023-03-20

## 2023-03-20 RX ORDER — BETAMETHASONE DIPROPIONATE 0.05 %
OINTMENT (GRAM) TOPICAL ONCE
OUTPATIENT
Start: 2023-03-20 | End: 2023-03-20

## 2023-03-20 RX ORDER — GINSENG 100 MG
CAPSULE ORAL ONCE
OUTPATIENT
Start: 2023-03-20 | End: 2023-03-20

## 2023-03-20 RX ORDER — LIDOCAINE HYDROCHLORIDE 40 MG/ML
SOLUTION TOPICAL ONCE
OUTPATIENT
Start: 2023-03-20 | End: 2023-03-20

## 2023-03-20 ASSESSMENT — PAIN SCALES - GENERAL: PAINLEVEL_OUTOF10: 0

## 2023-03-20 NOTE — PROGRESS NOTES
hospitalization. We will transition from [facility] to [facility] for his next appointment with me which will be in 2 weeks. He will return for a regular dressing change in 1 week. He returns on 2/27/2023. He continues to do well and there is improvement in the size of the wound. NuShield  #1 was grafted today and well-tolerated. We will order another graft for next week. He returns on 3/6/2023. There is continued improvement using skin substitute grafting. Kevinield #2 was grafted today and well-tolerated. He will come back next week for a dressing change. We will order a another graft for 2 weeks. He returns on 3/20/2023. Graft #2 was performed 2 weeks ago and remains intact with closing in the wound visible. We will keep this graft intact and continue to change his dressings weekly. We have the next graft available if needed. We will reevaluate him next visit. I will see him back in 3 weeks.     Wound Care  Orders Placed This Encounter   Procedures    Initiate Outpatient Wound Care Protocol     Cleanse wound with saline    If wound contains bioburden or contamination cleanse with wound cleanser or antimicrobial solution     For normal periwound tissue without irritation nor maceration, apply topical skin protectant    For periwound tissue with irritation and/or maceration, apply zinc based product, topical steroid cream/ointment, or equivalent     For wounds with dry firm black eschar and/or without exudate, apply betadine and leave open to air      For wounds with scant/small to no exudate or drainage, apply wound gel, hydrocolloid, polymer, or equivalent and cover with secondary dressing/foam      For wounds with moderate/large exudate or drainage, apply alginate, hydrofiber, polymer, or equivalent and cover with secondary dressing/foam    For wounds with nonviable tissue requiring removal, apply chemical or mechanical debrider and cover with secondary dressing/foam    For wounds with tunneling,

## 2023-03-20 NOTE — FLOWSHEET NOTE
03/20/23 1213   Left Leg Edema Point of Measurement   Leg circumference 37 cm   Ankle circumference 24 cm   Foot circumference 24 cm   Wound 09/09/22 Heel Left;Lateral   Date First Assessed/Time First Assessed: 09/09/22 1535   Present on Hospital Admission: Yes  Primary Wound Type: Diabetic Ulcer  Location: Heel  Wound Location Orientation: Left;Lateral   Wound Image    Wound Etiology Diabetic Martinez 2   Dressing Status Clean;Dry; Intact   Wound Cleansed Soap and water   Dressing/Treatment Other (comment)  (mepilex transfer and bordered foam)   Offloading for Diabetic Foot Ulcers Offloading ordered   Wound Length (cm) 0.5 cm   Wound Width (cm) 0.2 cm   Wound Depth (cm) 0.1 cm   Wound Surface Area (cm^2) 0.1 cm^2   Change in Wound Size % (l*w) 98.33   Wound Volume (cm^3) 0.01 cm^3   Wound Healing % 100   Wound Assessment Other (Comment)  (slight  dried covering from graft remains in  place)   Drainage Amount Scant   Drainage Description Serous   Odor None   Joanna-wound Assessment Intact   Margins Attached edges   Wound Thickness Description not for Pressure Injury Full thickness   Pain Assessment   Pain Assessment None - Denies Pain

## 2023-03-20 NOTE — WOUND CARE
Discharge Instructions for  Windy Fairchild  [address] [place] [address]  [address], [address] [facility]  Phone [phone]   Fax [phone]      NAME:  Mely Venegas OF BIRTH:  1945  MEDICAL RECORD NUMBER:  [id number]  DATE:  3/20/2023    Return Appointment:   3 weeks with Chandu Moyer MD      Instructions:  Apply Mepilex transfer over graft site and then cover with an bordered foam.  Cover with gauze wrap to keep in place. Home health to change this dressing once per week. Return to Dr. Katie Parry in 3 weeks. Should you experience increased redness, swelling, pain, foul odor, size of wound(s), or have a temperature over 101 degrees please contact the [address] [place] [address] Road at [phone] or if after hours contact your primary care physician or go to the hospital emergency department. PLEASE NOTE: IF YOU ARE UNABLE TO OBTAIN WOUND SUPPLIES, CONTINUE TO USE THE SUPPLIES YOU HAVE AVAILABLE UNTIL YOU ARE ABLE TO REACH US. IT IS MOST IMPORTANT TO KEEP THE WOUND COVERED AT ALL TIMES. Electronically signed Figueroa Ramirez.  Sundeep Plasencia on 3/20/2023 at 12:05 PM

## 2023-04-10 PROCEDURE — 93295 DEV INTERROG REMOTE 1/2/MLT: CPT | Performed by: INTERNAL MEDICINE

## 2023-04-10 PROCEDURE — 93296 REM INTERROG EVL PM/IDS: CPT | Performed by: INTERNAL MEDICINE

## 2023-04-20 ENCOUNTER — TELEPHONE (OUTPATIENT)
Dept: PULMONOLOGY | Age: 78
End: 2023-04-20

## 2023-04-20 NOTE — TELEPHONE ENCOUNTER
Last seen: 4/10/23  Hx: COPD, SHERI, recent PNA    Palliative care NP calling to report change in sputum from clear to beige, some wheezing & sats 91-94% w/ coughing. Spoke with spouse, confirmed CXR showed cleared PNA, at waking this morning was somewhat confused and increased coughing; able to sit up in the bed this afternoon, able to use nebulizer and take mucinex today; advised to use nebulizer every 4 hours while awake and msg will be routed to NP for response tomorrow. Please advise.

## 2023-04-20 NOTE — TELEPHONE ENCOUNTER
States patient is coughing and 02 is 91-94%. Wheezing and coughing up beige color mucus.   Was clear last week

## 2023-04-21 RX ORDER — PREDNISONE 10 MG/1
10 TABLET ORAL DAILY
Qty: 16 TABLET | Refills: 0 | Status: SHIPPED | OUTPATIENT
Start: 2023-04-21

## 2023-04-21 RX ORDER — DOXYCYCLINE HYCLATE 100 MG
100 TABLET ORAL 2 TIMES DAILY
Qty: 14 TABLET | Refills: 0 | Status: SHIPPED | OUTPATIENT
Start: 2023-04-21 | End: 2023-04-28

## 2023-04-21 NOTE — TELEPHONE ENCOUNTER
Note that I was out of the office yesterday. Notes are reviewed. Needs to use albuterol via nebulizer or inhaler 4 times daily, routinely for now, even if improves. Prednisone 20mg daily for 4 days, 10mg for 4 days, 10mg every other day for 4 days then stop. Modified dose due to diabetes. May need to touch base with provider prescribing diabetic RX for plan if blood sugar exceeds 300 while on prednisone. Cut back on carbs while on prednisone. Continue advair    Doxycycline 100mg po bid for 7 days, # 14, no refill. If declines, needs ER evaluation.

## 2023-04-25 ENCOUNTER — TELEPHONE (OUTPATIENT)
Dept: SLEEP MEDICINE | Age: 78
End: 2023-04-25

## 2023-04-27 ENCOUNTER — TELEPHONE (OUTPATIENT)
Dept: PULMONOLOGY | Age: 78
End: 2023-04-27

## 2023-04-27 NOTE — TELEPHONE ENCOUNTER
Spoke with patient's wife, Lashawn Curran, she said needs refill for albuterol neb sol, I informed her a script was sent to [address] [place] [address] back in 12/23/2022, she will check with the [address] [place] [address]

## 2023-04-28 ENCOUNTER — TELEPHONE (OUTPATIENT)
Dept: PULMONOLOGY | Age: 78
End: 2023-04-28

## 2023-05-01 NOTE — TELEPHONE ENCOUNTER
Pt states that he is feeling better. No needs at this time. Will call office if he needs something in the future.

## 2023-05-03 ENCOUNTER — TELEPHONE (OUTPATIENT)
Dept: INTERNAL MEDICINE CLINIC | Facility: CLINIC | Age: 78
End: 2023-05-03

## 2023-05-03 NOTE — TELEPHONE ENCOUNTER
Needs a diabetic foot exam in order for me to fill out the order. If he has seen a podiatrist lately, this visit can be used. If not, we will need to see him for this. Sorry for the inconvenience. Work in first available. Thanks.   Dung Arambula

## 2023-05-03 NOTE — TELEPHONE ENCOUNTER
----- Message from Kirstie Nance sent at 5/2/2023 12:42 PM EDT -----  Subject: Referral Request    Reason for referral request? Wife , Aram Ny, called and states Pt   needs a prescription sent stating he is a diabetic so he can get Diabetic   shoes and the forms that go in them. Wife would like to come  the   script herself. Please contact wife when ready.  Dr Felipe [facility] , [place], [address] [facility] [phone]  Provider patient wants to be referred to(if known):     Provider Phone Number(if known):[phone]    Additional Information for Provider?   ---------------------------------------------------------------------------  --------------  [address]    [phone]; OK to leave message on voicemail  ---------------------------------------------------------------------------  --------------

## 2023-05-04 ENCOUNTER — TELEPHONE (OUTPATIENT)
Dept: PULMONOLOGY | Age: 78
End: 2023-05-04

## 2023-05-10 ENCOUNTER — OFFICE VISIT (OUTPATIENT)
Dept: INTERNAL MEDICINE CLINIC | Facility: CLINIC | Age: 78
End: 2023-05-10
Payer: MEDICARE

## 2023-05-10 VITALS
OXYGEN SATURATION: 96 % | WEIGHT: 248.2 LBS | HEIGHT: 73 IN | RESPIRATION RATE: 17 BRPM | BODY MASS INDEX: 32.89 KG/M2 | TEMPERATURE: 98.2 F | HEART RATE: 64 BPM | DIASTOLIC BLOOD PRESSURE: 61 MMHG | SYSTOLIC BLOOD PRESSURE: 129 MMHG

## 2023-05-10 DIAGNOSIS — L97.422 NON-PRESSURE CHRONIC ULCER OF LEFT HEEL AND MIDFOOT WITH FAT LAYER EXPOSED (HCC): ICD-10-CM

## 2023-05-10 DIAGNOSIS — Z87.2 HISTORY OF PRESSURE ULCER: ICD-10-CM

## 2023-05-10 DIAGNOSIS — E11.59 TYPE 2 DIABETES MELLITUS WITH OTHER CIRCULATORY COMPLICATION, WITH LONG-TERM CURRENT USE OF INSULIN (HCC): Primary | ICD-10-CM

## 2023-05-10 DIAGNOSIS — Z79.4 TYPE 2 DIABETES MELLITUS WITH OTHER CIRCULATORY COMPLICATION, WITH LONG-TERM CURRENT USE OF INSULIN (HCC): Primary | ICD-10-CM

## 2023-05-10 DIAGNOSIS — I73.9 PAD (PERIPHERAL ARTERY DISEASE) (HCC): ICD-10-CM

## 2023-05-10 LAB
ALBUMIN SERPL-MCNC: 3.2 G/DL (ref 3.2–4.6)
ALBUMIN/GLOB SERPL: 0.9 (ref 0.4–1.6)
ALP SERPL-CCNC: 82 U/L (ref 50–136)
ALT SERPL-CCNC: 53 U/L (ref 12–65)
ANION GAP SERPL CALC-SCNC: 5 MMOL/L (ref 2–11)
AST SERPL-CCNC: 12 U/L (ref 15–37)
BILIRUB SERPL-MCNC: 0.7 MG/DL (ref 0.2–1.1)
BUN SERPL-MCNC: 13 MG/DL (ref 8–23)
CALCIUM SERPL-MCNC: 8.7 MG/DL (ref 8.3–10.4)
CHLORIDE SERPL-SCNC: 103 MMOL/L (ref 101–110)
CHOLEST SERPL-MCNC: 103 MG/DL
CO2 SERPL-SCNC: 29 MMOL/L (ref 21–32)
CREAT SERPL-MCNC: 1.2 MG/DL (ref 0.8–1.5)
EST. AVERAGE GLUCOSE BLD GHB EST-MCNC: 128 MG/DL
GLOBULIN SER CALC-MCNC: 3.7 G/DL (ref 2.8–4.5)
GLUCOSE SERPL-MCNC: 217 MG/DL (ref 65–100)
HBA1C MFR BLD: 6.1 % (ref 4.8–5.6)
HDLC SERPL-MCNC: 45 MG/DL (ref 40–60)
HDLC SERPL: 2.3
LDLC SERPL CALC-MCNC: 42.6 MG/DL
POTASSIUM SERPL-SCNC: 4.1 MMOL/L (ref 3.5–5.1)
PROT SERPL-MCNC: 6.9 G/DL (ref 6.3–8.2)
SODIUM SERPL-SCNC: 137 MMOL/L (ref 133–143)
TRIGL SERPL-MCNC: 77 MG/DL (ref 35–150)
VLDLC SERPL CALC-MCNC: 15.4 MG/DL (ref 6–23)

## 2023-05-10 PROCEDURE — 3078F DIAST BP <80 MM HG: CPT | Performed by: NURSE PRACTITIONER

## 2023-05-10 PROCEDURE — 99214 OFFICE O/P EST MOD 30 MIN: CPT | Performed by: NURSE PRACTITIONER

## 2023-05-10 PROCEDURE — 1123F ACP DISCUSS/DSCN MKR DOCD: CPT | Performed by: NURSE PRACTITIONER

## 2023-05-10 PROCEDURE — G8417 CALC BMI ABV UP PARAM F/U: HCPCS | Performed by: NURSE PRACTITIONER

## 2023-05-10 PROCEDURE — 4004F PT TOBACCO SCREEN RCVD TLK: CPT | Performed by: NURSE PRACTITIONER

## 2023-05-10 PROCEDURE — 3074F SYST BP LT 130 MM HG: CPT | Performed by: NURSE PRACTITIONER

## 2023-05-10 PROCEDURE — G8427 DOCREV CUR MEDS BY ELIG CLIN: HCPCS | Performed by: NURSE PRACTITIONER

## 2023-05-10 ASSESSMENT — ENCOUNTER SYMPTOMS
VOMITING: 0
SHORTNESS OF BREATH: 0
NAUSEA: 0
BLOOD IN STOOL: 0

## 2023-05-10 NOTE — PROGRESS NOTES
Mayuri Benedict
by mouth every 12 hours as needed      acetaminophen (TYLENOL) 500 MG tablet Take 1 tablet by mouth every 6 hours as needed for Pain Taking 1/2 two times daily      latanoprost (XALATAN) 0.005 % ophthalmic solution Apply 1 drop to eye nightly      nitroGLYCERIN (NITROSTAT) 0.4 MG SL tablet Place 1 tablet under the tongue      predniSONE (DELTASONE) 10 MG tablet Take 1 tablet by mouth daily 20mg x 4 days; 10mg x 4 days; 10mg every other day x 4 16 tablet 0     No current facility-administered medications for this visit. 1. Type 2 diabetes mellitus with other circulatory complication, with long-term current use of insulin ([facility]  We will reassess his A1c, he will keep his appointment today to be fitted for diabetic shoes to minimize any developing leg ulcerations. We will keep a low threshold to refer back to vascular or wound care should he develop any sign of infection or ulceration with his history of PAD. Discussed home monitoring of his feet. - Hemoglobin A1C; Future  - Comprehensive Metabolic Panel; Future  - Comprehensive Metabolic Panel  - Hemoglobin A1C  - Diabetic Shoe  -Lipid panel    2. PAD (peripheral artery disease) ([facility]      3.  History of pressure ulcer  - Diabetic Shoe          AARON Monique - CNP

## 2023-05-11 ENCOUNTER — TELEPHONE (OUTPATIENT)
Dept: INTERNAL MEDICINE CLINIC | Facility: CLINIC | Age: 78
End: 2023-05-11

## 2023-05-11 NOTE — TELEPHONE ENCOUNTER
Mr. Margarito Alcantara-  The diabetes control is stable with an a1c of 6.1. The liver and kidney functions are normal and cholesterol is well controlled with current therapy. Make sure you continue avoiding processed foods and sugar and animal fats and we will continue to monitor. Hoping you are doing well!   Janay

## 2023-05-12 ENCOUNTER — TELEPHONE (OUTPATIENT)
Dept: INTERNAL MEDICINE CLINIC | Facility: CLINIC | Age: 78
End: 2023-05-12

## 2023-05-12 ENCOUNTER — OFFICE VISIT (OUTPATIENT)
Dept: INTERNAL MEDICINE CLINIC | Facility: CLINIC | Age: 78
End: 2023-05-12
Payer: MEDICARE

## 2023-05-12 VITALS
RESPIRATION RATE: 16 BRPM | OXYGEN SATURATION: 94 % | BODY MASS INDEX: 32.75 KG/M2 | HEART RATE: 64 BPM | DIASTOLIC BLOOD PRESSURE: 59 MMHG | HEIGHT: 73 IN | TEMPERATURE: 98.6 F | SYSTOLIC BLOOD PRESSURE: 110 MMHG

## 2023-05-12 DIAGNOSIS — R35.0 URINARY FREQUENCY: Primary | ICD-10-CM

## 2023-05-12 DIAGNOSIS — E11.21 TYPE 2 DIABETES WITH NEPHROPATHY (HCC): ICD-10-CM

## 2023-05-12 DIAGNOSIS — R30.0 DYSURIA: Primary | ICD-10-CM

## 2023-05-12 LAB
BILIRUBIN, URINE, POC: NEGATIVE
BLOOD URINE, POC: NORMAL
GLUCOSE URINE, POC: NEGATIVE
GLUCOSE, POC: 114 MG/DL
KETONES, URINE, POC: NEGATIVE
LEUKOCYTE ESTERASE, URINE, POC: NORMAL
NITRITE, URINE, POC: POSITIVE
PH, URINE, POC: 5 (ref 4.6–8)
PROTEIN,URINE, POC: NORMAL
SPECIFIC GRAVITY, URINE, POC: 1.02 (ref 1–1.03)
URINALYSIS CLARITY, POC: NORMAL
URINALYSIS COLOR, POC: NORMAL
UROBILINOGEN, POC: NORMAL

## 2023-05-12 PROCEDURE — G8427 DOCREV CUR MEDS BY ELIG CLIN: HCPCS | Performed by: INTERNAL MEDICINE

## 2023-05-12 PROCEDURE — 3044F HG A1C LEVEL LT 7.0%: CPT | Performed by: INTERNAL MEDICINE

## 2023-05-12 PROCEDURE — 82962 GLUCOSE BLOOD TEST: CPT | Performed by: INTERNAL MEDICINE

## 2023-05-12 PROCEDURE — 81003 URINALYSIS AUTO W/O SCOPE: CPT | Performed by: INTERNAL MEDICINE

## 2023-05-12 PROCEDURE — 1123F ACP DISCUSS/DSCN MKR DOCD: CPT | Performed by: INTERNAL MEDICINE

## 2023-05-12 PROCEDURE — 4004F PT TOBACCO SCREEN RCVD TLK: CPT | Performed by: INTERNAL MEDICINE

## 2023-05-12 PROCEDURE — 3078F DIAST BP <80 MM HG: CPT | Performed by: INTERNAL MEDICINE

## 2023-05-12 PROCEDURE — G8417 CALC BMI ABV UP PARAM F/U: HCPCS | Performed by: INTERNAL MEDICINE

## 2023-05-12 PROCEDURE — 99213 OFFICE O/P EST LOW 20 MIN: CPT | Performed by: INTERNAL MEDICINE

## 2023-05-12 PROCEDURE — 3074F SYST BP LT 130 MM HG: CPT | Performed by: INTERNAL MEDICINE

## 2023-05-12 RX ORDER — CEPHALEXIN 500 MG/1
500 CAPSULE ORAL 3 TIMES DAILY
Qty: 30 CAPSULE | Refills: 0 | Status: CANCELLED | OUTPATIENT
Start: 2023-05-12 | End: 2023-05-22

## 2023-05-12 RX ORDER — CEPHALEXIN 500 MG/1
500 CAPSULE ORAL 3 TIMES DAILY
Qty: 21 CAPSULE | Refills: 0 | Status: SHIPPED | OUTPATIENT
Start: 2023-05-12

## 2023-05-12 NOTE — PROGRESS NOTES
5/13/2023 7:30 AM  Location:[facility] [address] [facility]  [place]  Patient #:  [id number]  YOB: 1945          YOUR LAST HEMOGLOBIN A1CS:   No results found for: HBA1C, [id number]    YOUR LAST LIPID PROFILE:   Lab Results   Component Value Date/Time    CHOL 103 05/10/2023 11:05 AM    HDL 45 05/10/2023 11:05 AM    VLDL 18 09/21/2021 02:54 PM         Lab Results   Component Value Date/Time    GFRAA >60 09/16/2022 04:42 AM    BUN 13 05/10/2023 11:05 AM     05/10/2023 11:05 AM    K 4.1 05/10/2023 11:05 AM     05/10/2023 11:05 AM    CO2 29 05/10/2023 11:05 AM           History of Present Illness     Chief Complaint   Patient presents with    Fever     Starting running a fever today  has not been eating, temp 100 this am    Urinary Frequency     Increased frequency     Has noted increased frequency, low grade temp this am, has only had water today, no food. Glu levels have been in low 100 range.  Last week he was treated with antibiotics for bronchitis    Mr. Alex Biggs is a 77 y.o. male  who presents for ov      Allergies   Allergen Reactions    Acetaminophen Hives     Per spouse has small amount of hives, takes 1/2 and tolerates     Azithromycin Hives    Morphine Hallucinations     Muscle twitching. jerking    Oxaprozin Other (See Comments)     ELEVATED BLOOD PRESSURE    Oxycodone Anxiety     Past Medical History:   Diagnosis Date    Acute respiratory failure with hypoxia ([address] 10/23/2014    MINI (acute kidney injury) ([address] 09/15/2014    MINI (acute kidney injury) ([address]     MINI (acute kidney injury) ([address]     Allergic rhinitis 11/10/2015    Asthma 11/10/2015    Benign essential hypertension 11/10/2015    Benign neoplasm of colon 11/10/2015    CAD (coronary artery disease) 11/10/2015    CAD (coronary artery disease) 1998, 1999    mix2, 3 stents [id number]    CAP (community acquired pneumonia) 12/31/2020    Cardiomyopathy ([address] 14/73/8386    Complicated wound infection

## 2023-05-12 NOTE — TELEPHONE ENCOUNTER
Patient wife called stated  has a temp of 99.8 to 101.5 and he has been having an overactive bladder he has used the bathroom today about 5 times. Wants to know what to do next ?   He just feels down

## 2023-05-12 NOTE — TELEPHONE ENCOUNTER
Sending to on call dr PCP out of office    I have talked with Ms. Ana Kirk, has a fever 100.6  and has been to the bathroom 5 times this morning. She thinks he has an uti. Has an odor to it. UTI SYMPTOMS    BURNING? No burning     FEVER? If yes, document temperature: 100.6    CHILLS? This morning he did     NAUSEA? no  VOMITING? No     BLOOD IN URINE? none    BACK PAIN? No     HOW LONG HAVE YOU HAD THE ABOVE SYMPTOMS?  Since this morning     ALLERGIES:     PHARMACY: [facility]     :  1945    He had an UTI when he was in the hospital about or 3 months ago

## 2023-05-14 ENCOUNTER — TELEPHONE (OUTPATIENT)
Dept: INTERNAL MEDICINE CLINIC | Facility: CLINIC | Age: 78
End: 2023-05-14

## 2023-05-14 LAB
BACTERIA SPEC CULT: ABNORMAL
SERVICE CMNT-IMP: ABNORMAL

## 2023-05-16 ENCOUNTER — TELEPHONE (OUTPATIENT)
Age: 78
End: 2023-05-16

## 2023-05-16 RX ORDER — CIPROFLOXACIN 500 MG/1
500 TABLET, FILM COATED ORAL 2 TIMES DAILY
Qty: 14 TABLET | Refills: 0 | Status: SHIPPED | OUTPATIENT
Start: 2023-05-16 | End: 2023-05-16 | Stop reason: ALTCHOICE

## 2023-05-16 RX ORDER — SULFAMETHOXAZOLE AND TRIMETHOPRIM 800; 160 MG/1; MG/1
1 TABLET ORAL 2 TIMES DAILY
Qty: 14 TABLET | Refills: 0 | Status: SHIPPED | OUTPATIENT
Start: 2023-05-16 | End: 2023-05-23

## 2023-05-16 NOTE — TELEPHONE ENCOUNTER
Spoke to palliative care. Instructed to call PCP to request alternate medication to cipro given DDI with amiodarone.

## 2023-05-16 NOTE — TELEPHONE ENCOUNTER
Needs clarification on Amiodarone and has been put on Cipro today. The counteract against each other .  Please call

## 2023-05-16 NOTE — TELEPHONE ENCOUNTER
Not a huge fan of prescribing sulfa drugs, but it appears that this is the only oral medication that will be effective that she can tolerate. Make sure you are eating yogurt daily while on this new antibiotic. Sorry for all the running around and inconvenience.

## 2023-05-16 NOTE — TELEPHONE ENCOUNTER
PT's wife called asking for an update on this medication. She stated it was supposed to be sent in yesterday.

## 2023-05-16 NOTE — TELEPHONE ENCOUNTER
The [facility] nurse called stating that he can't take cipro with amidarone.   Needs different medication sent to [facility]

## 2023-05-16 NOTE — TELEPHONE ENCOUNTER
Called [facility], Rx called in, spoke with nancy Dawkins [facility]. Dr Bonnie Durand, can you sign off on the Rx attached?

## 2023-05-23 NOTE — PROGRESS NOTES
Addended by: KYLAH ERICKSON on: 5/23/2023 12:08 PM     Modules accepted: Orders     Patient arrived to room 821 via stretcher from ED. Patient in stable condition. Respirations even/unlabored. NAD. Dual skin assessment completed with Noman Isaac RN. Call light and dinner tray within reach. Will continue to monitor.

## 2023-06-02 DIAGNOSIS — Z95.810 ICD (IMPLANTABLE CARDIOVERTER-DEFIBRILLATOR) IN PLACE: ICD-10-CM

## 2023-06-02 DIAGNOSIS — I48.0 PAROXYSMAL ATRIAL FIBRILLATION (HCC): ICD-10-CM

## 2023-06-02 DIAGNOSIS — I25.5 ISCHEMIC CARDIOMYOPATHY: ICD-10-CM

## 2023-06-02 DIAGNOSIS — I47.20 VT (VENTRICULAR TACHYCARDIA) (HCC): ICD-10-CM

## 2023-06-08 ENCOUNTER — NURSE ONLY (OUTPATIENT)
Dept: INTERNAL MEDICINE CLINIC | Facility: CLINIC | Age: 78
End: 2023-06-08

## 2023-06-08 DIAGNOSIS — E11.21 TYPE 2 DIABETES WITH NEPHROPATHY (HCC): ICD-10-CM

## 2023-06-08 LAB
ALBUMIN SERPL-MCNC: 3.7 G/DL (ref 3.2–4.6)
ALBUMIN/GLOB SERPL: 1.3 (ref 0.4–1.6)
ALP SERPL-CCNC: 75 U/L (ref 50–136)
ALT SERPL-CCNC: 23 U/L (ref 12–65)
ANION GAP SERPL CALC-SCNC: 6 MMOL/L (ref 2–11)
AST SERPL-CCNC: 15 U/L (ref 15–37)
BILIRUB SERPL-MCNC: 0.7 MG/DL (ref 0.2–1.1)
BUN SERPL-MCNC: 14 MG/DL (ref 8–23)
CALCIUM SERPL-MCNC: 9 MG/DL (ref 8.3–10.4)
CHLORIDE SERPL-SCNC: 108 MMOL/L (ref 101–110)
CHOLEST SERPL-MCNC: 134 MG/DL
CO2 SERPL-SCNC: 28 MMOL/L (ref 21–32)
CREAT SERPL-MCNC: 1.1 MG/DL (ref 0.8–1.5)
GLOBULIN SER CALC-MCNC: 2.9 G/DL (ref 2.8–4.5)
GLUCOSE SERPL-MCNC: 126 MG/DL (ref 65–100)
HDLC SERPL-MCNC: 63 MG/DL (ref 40–60)
HDLC SERPL: 2.1
LDLC SERPL CALC-MCNC: 55.4 MG/DL
POTASSIUM SERPL-SCNC: 4.5 MMOL/L (ref 3.5–5.1)
PROT SERPL-MCNC: 6.6 G/DL (ref 6.3–8.2)
SODIUM SERPL-SCNC: 142 MMOL/L (ref 133–143)
TRIGL SERPL-MCNC: 78 MG/DL (ref 35–150)
VLDLC SERPL CALC-MCNC: 15.6 MG/DL (ref 6–23)

## 2023-06-09 LAB
EST. AVERAGE GLUCOSE BLD GHB EST-MCNC: 131 MG/DL
HBA1C MFR BLD: 6.2 % (ref 4.8–5.6)

## 2023-06-19 ENCOUNTER — TELEPHONE (OUTPATIENT)
Dept: INTERNAL MEDICINE CLINIC | Facility: CLINIC | Age: 78
End: 2023-06-19

## 2023-06-19 ENCOUNTER — NURSE ONLY (OUTPATIENT)
Dept: INTERNAL MEDICINE CLINIC | Facility: CLINIC | Age: 78
End: 2023-06-19

## 2023-06-19 DIAGNOSIS — R30.0 DYSURIA: ICD-10-CM

## 2023-06-19 DIAGNOSIS — R30.0 DYSURIA: Primary | ICD-10-CM

## 2023-06-19 LAB
APPEARANCE UR: CLEAR
BACTERIA URNS QL MICRO: NEGATIVE /HPF
BILIRUB UR QL: NEGATIVE
CASTS URNS QL MICRO: ABNORMAL /LPF (ref 0–2)
COLOR UR: ABNORMAL
EPI CELLS #/AREA URNS HPF: ABNORMAL /HPF (ref 0–5)
GLUCOSE UR STRIP.AUTO-MCNC: NEGATIVE MG/DL
HGB UR QL STRIP: NEGATIVE
KETONES UR QL STRIP.AUTO: NEGATIVE MG/DL
LEUKOCYTE ESTERASE UR QL STRIP.AUTO: NEGATIVE
NITRITE UR QL STRIP.AUTO: NEGATIVE
PH UR STRIP: 6 (ref 5–9)
PROT UR STRIP-MCNC: ABNORMAL MG/DL
RBC #/AREA URNS HPF: ABNORMAL /HPF (ref 0–5)
SP GR UR REFRACTOMETRY: 1.02 (ref 1–1.02)
UROBILINOGEN UR QL STRIP.AUTO: 2 EU/DL (ref 0.2–1)
WBC URNS QL MICRO: ABNORMAL /HPF (ref 0–4)

## 2023-06-19 NOTE — TELEPHONE ENCOUNTER
UTI SYMPTOMS    BURNING? yes    FEVER? yes  If yes, document temperature: ? CHILLS? y    NAUSEA? n  VOMITING? n    BLOOD IN URINE? ?    BACK PAIN? y    HOW LONG HAVE YOU HAD THE ABOVE SYMPTOMS?  On and off several days    ALLERGIES: see note  PHARMACY: [facility]  : [birth year]    On lab sched

## 2023-06-22 ENCOUNTER — TELEPHONE (OUTPATIENT)
Dept: INTERNAL MEDICINE CLINIC | Facility: CLINIC | Age: 78
End: 2023-06-22

## 2023-06-22 LAB
BACTERIA SPEC CULT: NORMAL
SERVICE CMNT-IMP: NORMAL

## 2023-06-22 NOTE — TELEPHONE ENCOUNTER
Care Transitions Initial Follow Up Call    Outreach made within 2 business days of discharge: Yes    Patient: Alma Mendoza Patient : 1945   MRN: [id number]  Reason for Admission: There are no discharge diagnoses documented for the most recent discharge. Discharge Date: 2023       Spoke with: pt's wife     Discharge department/facility: [facility] Patient Contact:  Was patient able to fill all prescriptions: Yes  Was patient instructed to bring all medications to the follow-up visit: Yes  Is patient taking all medications as directed in the discharge summary?  Yes  Does patient understand their discharge instructions: Yes  Does patient have questions or concerns that need addressed prior to 7-14 day follow up office visit: no    Scheduled appointment with PCP within 7-14 days    Follow Up  Future Appointments   Date Time Provider Mac Almaraz   7/3/2023  2:20 PM Joel Avendano APRN - CNP [facility]   2023  2:15 PM Scottie Hawthorne MD [facility]   10/4/2023  2:15 PM Vivi Bowman MD [facility]   10/12/2023  1:00 PM Merary Brown APRN - CNP [facility]       Abdullahi Spaulding MA

## 2023-06-22 NOTE — TELEPHONE ENCOUNTER
He is in the hospital he got worst the evening on Monday , he had bacteria in blood stream and pneumonia . He is going to get a port due to needing additional antibiotics to clear up. They are going to also check his pacemaker does not have any bacteria as well.    He is currently at [facility]  Wife wanted to schedule hospital follow up which is scheduled for 7/3 at 2:20pm

## 2023-06-22 NOTE — TELEPHONE ENCOUNTER
D/C DATE: 6/23/23    D/C FROM: [facility]    D/C TO: home    PHONE NUMBER TO REACH PATIENT: [phone]    F/U APPT DATE & TIME: 7/3 at 2:20pm

## 2023-06-22 NOTE — TELEPHONE ENCOUNTER
----- Message from Vinicius Tyson MD sent at 6/22/2023  2:12 PM EDT -----  Urine culture was negative. Are you feeling any better? Thanks.   Dung Arambula

## 2023-06-22 NOTE — TELEPHONE ENCOUNTER
He is in the hospital he got worst the evening on Monday , he had bacteria in blood stream and pneumonia . He is going to get a port due to needing additional antibiotics to clear up. They are going to also check his pacemaker does not have any bacteria as well.    He is currently at [facility]  Wife wanted to schedule hospital follow up which is scheduled for 7/3 at 2:20pm

## 2023-07-03 ENCOUNTER — OFFICE VISIT (OUTPATIENT)
Dept: INTERNAL MEDICINE CLINIC | Facility: CLINIC | Age: 78
End: 2023-07-03

## 2023-07-03 VITALS
HEIGHT: 73 IN | HEART RATE: 58 BPM | WEIGHT: 261 LBS | BODY MASS INDEX: 34.59 KG/M2 | DIASTOLIC BLOOD PRESSURE: 63 MMHG | TEMPERATURE: 98.9 F | SYSTOLIC BLOOD PRESSURE: 128 MMHG | OXYGEN SATURATION: 94 % | RESPIRATION RATE: 17 BRPM

## 2023-07-03 DIAGNOSIS — E11.21 TYPE 2 DIABETES WITH NEPHROPATHY (HCC): ICD-10-CM

## 2023-07-03 DIAGNOSIS — Z09 HOSPITAL DISCHARGE FOLLOW-UP: Primary | ICD-10-CM

## 2023-07-03 DIAGNOSIS — Z87.01 HISTORY OF BACTERIAL PNEUMONIA: ICD-10-CM

## 2023-07-03 DIAGNOSIS — J44.9 CHRONIC OBSTRUCTIVE PULMONARY DISEASE, UNSPECIFIED COPD TYPE (HCC): ICD-10-CM

## 2023-07-03 DIAGNOSIS — R78.81 BACTEREMIA: ICD-10-CM

## 2023-07-03 DIAGNOSIS — I48.0 PAROXYSMAL ATRIAL FIBRILLATION (HCC): ICD-10-CM

## 2023-07-03 DIAGNOSIS — I73.9 PAD (PERIPHERAL ARTERY DISEASE) (HCC): ICD-10-CM

## 2023-07-03 DIAGNOSIS — D50.8 OTHER IRON DEFICIENCY ANEMIA: ICD-10-CM

## 2023-07-03 DIAGNOSIS — I10 PRIMARY HYPERTENSION: ICD-10-CM

## 2023-07-03 DIAGNOSIS — I50.22 CHRONIC SYSTOLIC CONGESTIVE HEART FAILURE (HCC): ICD-10-CM

## 2023-07-03 RX ORDER — LISINOPRIL 10 MG/1
10 TABLET ORAL DAILY
Qty: 90 TABLET | Refills: 1 | Status: SHIPPED | OUTPATIENT
Start: 2023-07-03

## 2023-07-03 RX ORDER — LISINOPRIL 10 MG/1
10 TABLET ORAL DAILY
COMMUNITY
End: 2023-07-03 | Stop reason: ALTCHOICE

## 2023-07-03 RX ORDER — FERROUS SULFATE 325(65) MG
325 TABLET ORAL
Qty: 90 TABLET | Refills: 1 | Status: SHIPPED | OUTPATIENT
Start: 2023-07-03

## 2023-07-03 ASSESSMENT — ENCOUNTER SYMPTOMS
SHORTNESS OF BREATH: 0
VOMITING: 0
NAUSEA: 0

## 2023-07-03 NOTE — PROGRESS NOTES
Current Outpatient Medications   Medication Sig Dispense Refill    lisinopril (PRINIVIL;ZESTRIL) 10 MG tablet Take 1 tablet by mouth daily      carvedilol (COREG) 6.25 MG tablet Take 1 tablet by mouth 2 times daily (with meals) 60 tablet 5    clopidogrel (PLAVIX) 75 MG tablet TAKE 1 TABLET BY MOUTH EVERY DAY 90 tablet 2    furosemide (LASIX) 20 MG tablet Take 1 tablet by mouth daily 30 tablet 6    rosuvastatin (CRESTOR) 10 MG tablet TAKE 1 TABLET BY MOUTH EVERY DAY 90 tablet 3    apixaban (ELIQUIS) 5 MG TABS tablet Take 1 tablet by mouth in the morning and 1 tablet in the evening. 180 tablet 3    amiodarone (CORDARONE) 200 MG tablet Take 1 tablet by mouth daily 90 tablet 3    tamsulosin (FLOMAX) 0.4 MG capsule Take 1 capsule by mouth daily 90 capsule 1    fluticasone-salmeterol (ADVAIR) 250-50 MCG/ACT AEPB diskus inhaler 1 inhalation twice daily, rinse mouth after use 60 each 11    fluticasone (FLONASE) 50 MCG/ACT nasal spray USE 1 OR 2 SPRAYS IN EACH NOSTRIL EVERY DAY. 3 each 3    ferrous sulfate (IRON 325) 325 (65 Fe) MG tablet Take 1 tablet by mouth daily (with breakfast) 30 tablet 3    montelukast (SINGULAIR) 10 MG tablet TAKE 1 TABLET BY MOUTH EVERY DAY AT BEDTIME 90 tablet 3    magnesium oxide (MAG-OX) 400 (240 Mg) MG tablet TAKE 1 TABLET BY MOUTH EVERY DAY 90 tablet 3    albuterol (PROVENTIL) (2.5 MG/3ML) 0.083% nebulizer solution 1 vial via nebulizer 4 times daily if needed for shortness of breath or wheezing. Diagnosis-COPD, J44.9. Bill to Medicare part B 360 mL 11    senna-docusate (PERICOLACE) 8.6-50 MG per tablet Take 1 tablet by mouth nightly      CPAP Machine MISC by Does not apply route      albuterol sulfate HFA (PROVENTIL;VENTOLIN;PROAIR) 108 (90 Base) MCG/ACT inhaler USE 2 PUFFS BY INHALATION 4 TIMES DAILY AS NEEDED FOR WHEEZING OR SHORTNESS OF BREATH. Patient prefers ventolin.  18 g 11    GUAIFENESIN 1200 PO Take 1 tablet by mouth every 12 hours as needed      acetaminophen (TYLENOL) 500 MG

## 2023-07-11 PROCEDURE — 93295 DEV INTERROG REMOTE 1/2/MLT: CPT | Performed by: INTERNAL MEDICINE

## 2023-07-11 PROCEDURE — 93296 REM INTERROG EVL PM/IDS: CPT | Performed by: INTERNAL MEDICINE

## 2023-07-17 ENCOUNTER — TELEPHONE (OUTPATIENT)
Dept: INTERNAL MEDICINE CLINIC | Facility: CLINIC | Age: 78
End: 2023-07-17

## 2023-07-17 RX ORDER — GLIPIZIDE 5 MG/1
5 TABLET, FILM COATED, EXTENDED RELEASE ORAL DAILY
Qty: 90 TABLET | Refills: 1 | Status: SHIPPED | OUTPATIENT
Start: 2023-07-17

## 2023-07-17 NOTE — TELEPHONE ENCOUNTER
I have talked with MsGeo Mindy Sportsman and she has stated that she has been on Jardiance before and it had given her an yeast infection. She stated that Garrett has just gotten over an UTI, and wants to know if the Marshia Fallow  will be a good medication for him to start on? She was asking if you wanted her to start him back on the gliperide?      Please advise

## 2023-07-17 NOTE — TELEPHONE ENCOUNTER
Patient wife called stated that he was told to stop his glipiZIDE 10mg and his fasting blood sugars are running high this morning it was 228. Wants to know if he needs to go back on the medication and if so what MG does he need to take?   And if it can be sent into [facility]

## 2023-07-17 NOTE — TELEPHONE ENCOUNTER
Agreed that this is not a good fit for him. Sent in lower dosage of glipizide xl. Keep a low threshold for contacting the office with worsening symptoms. Thanks.   [address]

## 2023-07-18 ENCOUNTER — TELEPHONE (OUTPATIENT)
Dept: INTERNAL MEDICINE CLINIC | Facility: CLINIC | Age: 78
End: 2023-07-18

## 2023-07-18 NOTE — TELEPHONE ENCOUNTER
Pharmacy called about note on Rx for glipiZIDE stated that \" PATIENT NEEDS A NEW RX FOR GLIPIZIDE ER 10MG, THANKS, [facility] \"     They are wanting to confirm if it is one tablet or two tablet a day? And the mg?    Please confirm

## 2023-07-19 NOTE — TELEPHONE ENCOUNTER
I have talked with Adrian Payne at [address] [facility] and have given him this message.

## 2023-07-26 ENCOUNTER — OFFICE VISIT (OUTPATIENT)
Age: 78
End: 2023-07-26
Payer: MEDICARE

## 2023-07-26 VITALS
WEIGHT: 264.2 LBS | DIASTOLIC BLOOD PRESSURE: 70 MMHG | HEIGHT: 73 IN | SYSTOLIC BLOOD PRESSURE: 130 MMHG | BODY MASS INDEX: 35.02 KG/M2 | HEART RATE: 65 BPM

## 2023-07-26 DIAGNOSIS — Z95.810 ICD (IMPLANTABLE CARDIOVERTER-DEFIBRILLATOR) IN PLACE: ICD-10-CM

## 2023-07-26 DIAGNOSIS — I25.5 ISCHEMIC CARDIOMYOPATHY: ICD-10-CM

## 2023-07-26 DIAGNOSIS — I25.10 CORONARY ARTERY DISEASE INVOLVING NATIVE CORONARY ARTERY OF NATIVE HEART WITHOUT ANGINA PECTORIS: Primary | ICD-10-CM

## 2023-07-26 DIAGNOSIS — I47.20 VENTRICULAR TACHYCARDIA (HCC): ICD-10-CM

## 2023-07-26 DIAGNOSIS — I10 PRIMARY HYPERTENSION: ICD-10-CM

## 2023-07-26 PROCEDURE — G8417 CALC BMI ABV UP PARAM F/U: HCPCS | Performed by: INTERNAL MEDICINE

## 2023-07-26 PROCEDURE — 99214 OFFICE O/P EST MOD 30 MIN: CPT | Performed by: INTERNAL MEDICINE

## 2023-07-26 PROCEDURE — 1123F ACP DISCUSS/DSCN MKR DOCD: CPT | Performed by: INTERNAL MEDICINE

## 2023-07-26 PROCEDURE — 4004F PT TOBACCO SCREEN RCVD TLK: CPT | Performed by: INTERNAL MEDICINE

## 2023-07-26 PROCEDURE — G8427 DOCREV CUR MEDS BY ELIG CLIN: HCPCS | Performed by: INTERNAL MEDICINE

## 2023-07-26 PROCEDURE — 3078F DIAST BP <80 MM HG: CPT | Performed by: INTERNAL MEDICINE

## 2023-07-26 PROCEDURE — 3075F SYST BP GE 130 - 139MM HG: CPT | Performed by: INTERNAL MEDICINE

## 2023-07-26 ASSESSMENT — ENCOUNTER SYMPTOMS
LEFT EYE: 1
BLURRED VISION: 0
WHEEZING: 0
HEMATOCHEZIA: 0
CHEST TIGHTNESS: 0
HOARSE VOICE: 0
SPUTUM PRODUCTION: 0
BOWEL INCONTINENCE: 0
ORTHOPNEA: 0
COLOR CHANGE: 0
HEMATEMESIS: 0
DIARRHEA: 0
SHORTNESS OF BREATH: 0
ABDOMINAL PAIN: 0

## 2023-07-26 NOTE — PROGRESS NOTES
and tolerates     Azithromycin Hives    Morphine Hallucinations     Muscle twitching. jerking    Oxaprozin Other (See Comments)     ELEVATED BLOOD PRESSURE    Oxycodone Anxiety     Past Medical History:   Diagnosis Date    Acute respiratory failure with hypoxia ([address]) 10/23/2014    MINI (acute kidney injury) ([address]) 09/15/2014    MINI (acute kidney injury) ([address])     MINI (acute kidney injury) ([address])     Allergic rhinitis 11/10/2015    Asthma 11/10/2015    Benign essential hypertension 11/10/2015    Benign neoplasm of colon 11/10/2015    CAD (coronary artery disease) 11/10/2015    CAD (coronary artery disease) 1998, 1999    mix2, 3 stents [id number]    CAP (community acquired pneumonia) 12/31/2020    Cardiomyopathy ([address]) 92/50/3993    Complicated wound infection 11/10/2015    COPD (chronic obstructive pulmonary disease) with emphysema ([address]) 11/10/2015    COPD exacerbation ([address]) 10/12/2011    COVID-19 12/31/2020    Diabetes mellitus type 2, controlled ([address]) 11/10/2015    doesn/t check daily oral meds, A1c 6.0 (8/10/22)    Diabetic neuropathy ([address]) 11/10/2015    Disruption of wound, unspecified 10/09/2014    Encounter for long-term (current) use of other high-risk medications 11/10/2015    Extremity atherosclerosis with intermittent claudication ([address]) 11/10/2015    H/O endarterectomy 11/10/2015    Hiatal hernia 11/10/2015    History of 2019 novel coronavirus disease (COVID-19)     12/31/2020- hospitalized    Hyperglycemia due to type 1 diabetes mellitus ([address]) 11/10/2015    Hyperlipidemia 11/10/2015    ICD (implantable cardioverter-defibrillator) in place 06/16/2022    Monomorphic ventricular tachycardia ([address]) 12/31/2020    Myocardial infarction ([address]) 1999    Myocardial infarction ([address]) 11/10/2015    Obesity 11/10/2015    Orthostatic hypotension 11/10/2015    Osteoarthritis 11/10/2015    Peripheral vascular disease ([address]) 11/10/2015    PNA (pneumonia) 02/08/2019    PVC (premature ventricular contraction)     Rash 11/10/2014

## 2023-08-24 ENCOUNTER — NURSE ONLY (OUTPATIENT)
Age: 78
End: 2023-08-24

## 2023-08-24 DIAGNOSIS — I47.20 VENTRICULAR TACHYCARDIA (HCC): Primary | ICD-10-CM

## 2023-09-11 RX ORDER — TAMSULOSIN HYDROCHLORIDE 0.4 MG/1
CAPSULE ORAL DAILY
Qty: 90 CAPSULE | Refills: 1 | Status: SHIPPED | OUTPATIENT
Start: 2023-09-11

## 2023-10-04 ENCOUNTER — OFFICE VISIT (OUTPATIENT)
Dept: INTERNAL MEDICINE CLINIC | Facility: CLINIC | Age: 78
End: 2023-10-04
Payer: MEDICARE

## 2023-10-04 VITALS
RESPIRATION RATE: 18 BRPM | HEART RATE: 61 BPM | DIASTOLIC BLOOD PRESSURE: 64 MMHG | HEIGHT: 73 IN | SYSTOLIC BLOOD PRESSURE: 139 MMHG | BODY MASS INDEX: 35.78 KG/M2 | WEIGHT: 270 LBS

## 2023-10-04 DIAGNOSIS — N39.0 URINARY TRACT INFECTION WITHOUT HEMATURIA, SITE UNSPECIFIED: ICD-10-CM

## 2023-10-04 DIAGNOSIS — I10 PRIMARY HYPERTENSION: Primary | ICD-10-CM

## 2023-10-04 DIAGNOSIS — E66.01 SEVERE OBESITY (BMI 35.0-39.9) WITH COMORBIDITY (HCC): ICD-10-CM

## 2023-10-04 DIAGNOSIS — Z00.00 MEDICARE ANNUAL WELLNESS VISIT, SUBSEQUENT: ICD-10-CM

## 2023-10-04 DIAGNOSIS — G95.19 INTERMITTENT SPINAL CLAUDICATION (HCC): ICD-10-CM

## 2023-10-04 DIAGNOSIS — J44.9 CHRONIC OBSTRUCTIVE PULMONARY DISEASE, UNSPECIFIED COPD TYPE (HCC): ICD-10-CM

## 2023-10-04 DIAGNOSIS — M54.50 LOW BACK PAIN WITHOUT SCIATICA, UNSPECIFIED BACK PAIN LATERALITY, UNSPECIFIED CHRONICITY: ICD-10-CM

## 2023-10-04 DIAGNOSIS — E11.21 TYPE 2 DIABETES WITH NEPHROPATHY (HCC): ICD-10-CM

## 2023-10-04 DIAGNOSIS — K59.00 CONSTIPATION, UNSPECIFIED CONSTIPATION TYPE: ICD-10-CM

## 2023-10-04 LAB
ALBUMIN SERPL-MCNC: 3.7 G/DL (ref 3.2–4.6)
ALBUMIN/GLOB SERPL: 1.1 (ref 0.4–1.6)
ALP SERPL-CCNC: 77 U/L (ref 50–136)
ALT SERPL-CCNC: 22 U/L (ref 12–65)
ANION GAP SERPL CALC-SCNC: 7 MMOL/L (ref 2–11)
APPEARANCE UR: ABNORMAL
AST SERPL-CCNC: 10 U/L (ref 15–37)
BACTERIA URNS QL MICRO: ABNORMAL /HPF
BASOPHILS # BLD: 0 K/UL (ref 0–0.2)
BASOPHILS NFR BLD: 0 % (ref 0–2)
BILIRUB SERPL-MCNC: 0.6 MG/DL (ref 0.2–1.1)
BILIRUB UR QL: NEGATIVE
BUN SERPL-MCNC: 22 MG/DL (ref 8–23)
CALCIUM SERPL-MCNC: 9.3 MG/DL (ref 8.3–10.4)
CHLORIDE SERPL-SCNC: 107 MMOL/L (ref 101–110)
CO2 SERPL-SCNC: 26 MMOL/L (ref 21–32)
COLOR UR: ABNORMAL
CREAT SERPL-MCNC: 1.5 MG/DL (ref 0.8–1.5)
DIFFERENTIAL METHOD BLD: ABNORMAL
EOSINOPHIL # BLD: 0 K/UL (ref 0–0.8)
EOSINOPHIL NFR BLD: 1 % (ref 0.5–7.8)
ERYTHROCYTE [DISTWIDTH] IN BLOOD BY AUTOMATED COUNT: 16.6 % (ref 11.9–14.6)
GLOBULIN SER CALC-MCNC: 3.3 G/DL (ref 2.8–4.5)
GLUCOSE SERPL-MCNC: 130 MG/DL (ref 65–100)
GLUCOSE UR STRIP.AUTO-MCNC: NEGATIVE MG/DL
HCT VFR BLD AUTO: 35 % (ref 41.1–50.3)
HGB BLD-MCNC: 11.4 G/DL (ref 13.6–17.2)
HGB UR QL STRIP: ABNORMAL
IMM GRANULOCYTES # BLD AUTO: 0 K/UL (ref 0–0.5)
IMM GRANULOCYTES NFR BLD AUTO: 0 % (ref 0–5)
KETONES UR QL STRIP.AUTO: ABNORMAL MG/DL
LEUKOCYTE ESTERASE UR QL STRIP.AUTO: ABNORMAL
LYMPHOCYTES # BLD: 1.5 K/UL (ref 0.5–4.6)
LYMPHOCYTES NFR BLD: 24 % (ref 13–44)
MCH RBC QN AUTO: 31.7 PG (ref 26.1–32.9)
MCHC RBC AUTO-ENTMCNC: 32.6 G/DL (ref 31.4–35)
MCV RBC AUTO: 97.2 FL (ref 82–102)
MONOCYTES # BLD: 0.5 K/UL (ref 0.1–1.3)
MONOCYTES NFR BLD: 8 % (ref 4–12)
NEUTS SEG # BLD: 4.1 K/UL (ref 1.7–8.2)
NEUTS SEG NFR BLD: 67 % (ref 43–78)
NITRITE UR QL STRIP.AUTO: NEGATIVE
NRBC # BLD: 0 K/UL (ref 0–0.2)
OTHER OBSERVATIONS: ABNORMAL
PH UR STRIP: 5.5 (ref 5–9)
PLATELET # BLD AUTO: 171 K/UL (ref 150–450)
PMV BLD AUTO: 11.4 FL (ref 9.4–12.3)
POTASSIUM SERPL-SCNC: 4.5 MMOL/L (ref 3.5–5.1)
PROT SERPL-MCNC: 7 G/DL (ref 6.3–8.2)
PROT UR STRIP-MCNC: 30 MG/DL
RBC # BLD AUTO: 3.6 M/UL (ref 4.23–5.6)
SODIUM SERPL-SCNC: 140 MMOL/L (ref 133–143)
SP GR UR REFRACTOMETRY: 1.02 (ref 1–1.02)
UROBILINOGEN UR QL STRIP.AUTO: 1 EU/DL (ref 0.2–1)
WBC # BLD AUTO: 6.1 K/UL (ref 4.3–11.1)
WBC URNS QL MICRO: >100 /HPF

## 2023-10-04 PROCEDURE — 3075F SYST BP GE 130 - 139MM HG: CPT | Performed by: INTERNAL MEDICINE

## 2023-10-04 PROCEDURE — 4004F PT TOBACCO SCREEN RCVD TLK: CPT | Performed by: INTERNAL MEDICINE

## 2023-10-04 PROCEDURE — 1123F ACP DISCUSS/DSCN MKR DOCD: CPT | Performed by: INTERNAL MEDICINE

## 2023-10-04 PROCEDURE — 3023F SPIROM DOC REV: CPT | Performed by: INTERNAL MEDICINE

## 2023-10-04 PROCEDURE — G0439 PPPS, SUBSEQ VISIT: HCPCS | Performed by: INTERNAL MEDICINE

## 2023-10-04 PROCEDURE — G8427 DOCREV CUR MEDS BY ELIG CLIN: HCPCS | Performed by: INTERNAL MEDICINE

## 2023-10-04 PROCEDURE — 3078F DIAST BP <80 MM HG: CPT | Performed by: INTERNAL MEDICINE

## 2023-10-04 PROCEDURE — G8417 CALC BMI ABV UP PARAM F/U: HCPCS | Performed by: INTERNAL MEDICINE

## 2023-10-04 PROCEDURE — 3044F HG A1C LEVEL LT 7.0%: CPT | Performed by: INTERNAL MEDICINE

## 2023-10-04 PROCEDURE — G8484 FLU IMMUNIZE NO ADMIN: HCPCS | Performed by: INTERNAL MEDICINE

## 2023-10-04 PROCEDURE — 99213 OFFICE O/P EST LOW 20 MIN: CPT | Performed by: INTERNAL MEDICINE

## 2023-10-04 ASSESSMENT — PATIENT HEALTH QUESTIONNAIRE - PHQ9
1. LITTLE INTEREST OR PLEASURE IN DOING THINGS: 0
SUM OF ALL RESPONSES TO PHQ QUESTIONS 1-9: 0
SUM OF ALL RESPONSES TO PHQ9 QUESTIONS 1 & 2: 0
2. FEELING DOWN, DEPRESSED OR HOPELESS: 0
SUM OF ALL RESPONSES TO PHQ QUESTIONS 1-9: 0

## 2023-10-04 ASSESSMENT — ENCOUNTER SYMPTOMS
WHEEZING: 0
DIARRHEA: 0
SHORTNESS OF BREATH: 1
NAUSEA: 0
COUGH: 0
CONSTIPATION: 1
BLOOD IN STOOL: 0
BACK PAIN: 1
VOMITING: 0

## 2023-10-04 ASSESSMENT — LIFESTYLE VARIABLES
HOW OFTEN DO YOU HAVE A DRINK CONTAINING ALCOHOL: NEVER
HOW MANY STANDARD DRINKS CONTAINING ALCOHOL DO YOU HAVE ON A TYPICAL DAY: PATIENT DOES NOT DRINK

## 2023-10-04 NOTE — PROGRESS NOTES
mellitus type 2, controlled ([address]) 11/10/2015    doesn/t check daily oral meds, A1c 6.0 (8/10/22)    Diabetic neuropathy ([address]) 11/10/2015    Disruption of wound, unspecified 10/09/2014    Encounter for long-term (current) use of other high-risk medications 11/10/2015    Extremity atherosclerosis with intermittent claudication ([address]) 11/10/2015    H/O endarterectomy 11/10/2015    Hiatal hernia 11/10/2015    History of 2019 novel coronavirus disease (COVID-19)     2020- hospitalized    Hyperglycemia due to type 1 diabetes mellitus ([address]) 11/10/2015    Hyperlipidemia 11/10/2015    ICD (implantable cardioverter-defibrillator) in place 2022    Monomorphic ventricular tachycardia ([address]) 2020    Myocardial infarction ([address]) 1999    Myocardial infarction ([address]) 11/10/2015    Obesity 11/10/2015    Orthostatic hypotension 11/10/2015    Osteoarthritis 11/10/2015    Peripheral vascular disease ([address]) 11/10/2015    PNA (pneumonia) 2019    PVC (premature ventricular contraction)     Rash     Seroma complicating a procedure 10/02/2014    Sleep apnea     cpap    Toe laceration     Type 2 diabetes with nephropathy ([address])     Uncontrolled type 2 diabetes mellitus 11/10/2015    Vertiginous syndromes and other disorders of vestibular system 11/10/2015     Social History     Socioeconomic History    Marital status:      Spouse name: None    Number of children: None    Years of education: None    Highest education level: None   Tobacco Use    Smoking status: Former     Packs/day: 1.00     Years: 50.00     Additional pack years: 0.00     Total pack years: 50.00     Types: Cigarettes     Quit date: 10/2/2011     Years since quittin.0     Passive exposure: Never    Smokeless tobacco: Current     Types: Chew    Tobacco comments:     Chews tobacco daily   Vaping Use    Vaping Use: Never used   Substance and Sexual Activity    Alcohol use:  Yes     Alcohol/week: 0.0 standard drinks of alcohol    Drug use:

## 2023-10-06 ENCOUNTER — TELEPHONE (OUTPATIENT)
Dept: INTERNAL MEDICINE CLINIC | Facility: CLINIC | Age: 78
End: 2023-10-06

## 2023-10-06 DIAGNOSIS — R19.5 DARK STOOLS: Primary | ICD-10-CM

## 2023-10-06 LAB
BACTERIA SPEC CULT: ABNORMAL
SERVICE CMNT-IMP: ABNORMAL

## 2023-10-06 RX ORDER — CEFDINIR 300 MG/1
300 CAPSULE ORAL 2 TIMES DAILY
Qty: 14 CAPSULE | Refills: 0 | Status: SHIPPED | OUTPATIENT
Start: 2023-10-06 | End: 2023-10-13

## 2023-10-06 NOTE — TELEPHONE ENCOUNTER
Left message on voicemail. You do have a UTI. I'm sending in antibiotics. Eat yogurt every day while you are on antibiotics. Please call and check on him. Thanks.   [address]

## 2023-10-09 RX ORDER — NITROFURANTOIN 25; 75 MG/1; MG/1
100 CAPSULE ORAL 2 TIMES DAILY
Qty: 14 CAPSULE | Refills: 0 | Status: SHIPPED | OUTPATIENT
Start: 2023-10-09 | End: 2023-10-16

## 2023-10-09 NOTE — TELEPHONE ENCOUNTER
I've ordered FIT test and CBC to be done. Please schedule at earliest convenience. Thanks.   [address]

## 2023-10-09 NOTE — TELEPHONE ENCOUNTER
I sent in [address]. Iron can cause stool to darken. Please determine if this is what she meant. Thanks.   [address]

## 2023-10-09 NOTE — TELEPHONE ENCOUNTER
Pt's wife given message. She states the pt reports black stools for 1 month or more. He has been taking iron since July. I explained to her that iron can cause darkened stools, but she wanted PCP to be aware he was having black stools.

## 2023-10-09 NOTE — TELEPHONE ENCOUNTER
Pt's wife given results and relayed understanding. Pt's wife states the antibiotic called in is the same antibiotic he took while in the hospital most recently. She reports that after he took this his penis became inflamed and he still had the urinary odor. She is requesting a different antibiotic.

## 2023-10-10 ENCOUNTER — NURSE ONLY (OUTPATIENT)
Dept: INTERNAL MEDICINE CLINIC | Facility: CLINIC | Age: 78
End: 2023-10-10

## 2023-10-10 DIAGNOSIS — R19.5 DARK STOOLS: ICD-10-CM

## 2023-10-10 LAB
BASOPHILS # BLD: 0 K/UL (ref 0–0.2)
BASOPHILS NFR BLD: 0 % (ref 0–2)
DIFFERENTIAL METHOD BLD: ABNORMAL
EOSINOPHIL # BLD: 0 K/UL (ref 0–0.8)
EOSINOPHIL NFR BLD: 0 % (ref 0.5–7.8)
ERYTHROCYTE [DISTWIDTH] IN BLOOD BY AUTOMATED COUNT: 16.6 % (ref 11.9–14.6)
HCT VFR BLD AUTO: 35.5 % (ref 41.1–50.3)
HGB BLD-MCNC: 11.6 G/DL (ref 13.6–17.2)
IMM GRANULOCYTES # BLD AUTO: 0 K/UL (ref 0–0.5)
IMM GRANULOCYTES NFR BLD AUTO: 0 % (ref 0–5)
LYMPHOCYTES # BLD: 1.2 K/UL (ref 0.5–4.6)
LYMPHOCYTES NFR BLD: 16 % (ref 13–44)
MCH RBC QN AUTO: 31.9 PG (ref 26.1–32.9)
MCHC RBC AUTO-ENTMCNC: 32.7 G/DL (ref 31.4–35)
MCV RBC AUTO: 97.5 FL (ref 82–102)
MONOCYTES # BLD: 1 K/UL (ref 0.1–1.3)
MONOCYTES NFR BLD: 14 % (ref 4–12)
NEUTS SEG # BLD: 5.2 K/UL (ref 1.7–8.2)
NEUTS SEG NFR BLD: 70 % (ref 43–78)
NRBC # BLD: 0 K/UL (ref 0–0.2)
PLATELET # BLD AUTO: 158 K/UL (ref 150–450)
PMV BLD AUTO: 10.7 FL (ref 9.4–12.3)
RBC # BLD AUTO: 3.64 M/UL (ref 4.23–5.6)
WBC # BLD AUTO: 7.5 K/UL (ref 4.3–11.1)

## 2023-10-10 RX ORDER — FUROSEMIDE 20 MG/1
20 TABLET ORAL DAILY
Qty: 30 TABLET | Refills: 5 | Status: SHIPPED | OUTPATIENT
Start: 2023-10-10

## 2023-10-10 RX ORDER — CARVEDILOL 6.25 MG/1
6.25 TABLET ORAL 2 TIMES DAILY WITH MEALS
Qty: 60 TABLET | Refills: 4 | Status: SHIPPED | OUTPATIENT
Start: 2023-10-10

## 2023-10-11 ENCOUNTER — TELEPHONE (OUTPATIENT)
Dept: INTERNAL MEDICINE CLINIC | Facility: CLINIC | Age: 78
End: 2023-10-11

## 2023-10-11 NOTE — RESULT ENCOUNTER NOTE
Labs are stable. Hemoglobin is a little better than it was. Not worried about your dark stools. Continue your current doses of medications. Keep up the good work. Thanks.   [address]

## 2023-10-11 NOTE — TELEPHONE ENCOUNTER
----- Message from Patricia Tolentino MD sent at 10/11/2023  9:38 AM EDT -----  Labs are stable. Hemoglobin is a little better than it was. Not worried about your dark stools. Continue your current doses of medications. Keep up the good work. Thanks.   [address]

## 2023-10-13 PROCEDURE — 93295 DEV INTERROG REMOTE 1/2/MLT: CPT | Performed by: INTERNAL MEDICINE

## 2023-10-13 PROCEDURE — 93296 REM INTERROG EVL PM/IDS: CPT | Performed by: INTERNAL MEDICINE

## 2023-10-19 ENCOUNTER — TELEPHONE (OUTPATIENT)
Dept: INTERNAL MEDICINE CLINIC | Facility: CLINIC | Age: 78
End: 2023-10-19

## 2023-10-19 DIAGNOSIS — N39.0 RECURRENT UTI: ICD-10-CM

## 2023-10-19 DIAGNOSIS — R33.9 URINARY RETENTION: Primary | ICD-10-CM

## 2023-10-19 RX ORDER — NITROFURANTOIN 25; 75 MG/1; MG/1
100 CAPSULE ORAL 2 TIMES DAILY
Qty: 20 CAPSULE | Refills: 0 | Status: SHIPPED | OUTPATIENT
Start: 2023-10-19 | End: 2023-10-29

## 2023-10-19 NOTE — TELEPHONE ENCOUNTER
Jocelyne Velazquez called to report pt is having trouble urinating again, would like to know if a referral to a urologist would be beneficial.     Pt would also like to review lab results from 10/4/ and 10/10.

## 2023-10-20 NOTE — TELEPHONE ENCOUNTER
Ordered referral as requested. Sent in antibiotics to [facility] based on last culture results. Eat yogurt every day while you are on antibiotics. Keep a low threshold for contacting the office with worsening symptoms. Thanks.   [address]

## 2023-10-20 NOTE — TELEPHONE ENCOUNTER
I have talked with Ms. Monik Mcelroy and have given her this message. She said that the urologist has called this morning and have set up an appointment with them.

## 2023-11-03 ENCOUNTER — OFFICE VISIT (OUTPATIENT)
Dept: PULMONOLOGY | Age: 78
End: 2023-11-03
Payer: MEDICARE

## 2023-11-03 VITALS
HEIGHT: 73 IN | OXYGEN SATURATION: 96 % | WEIGHT: 276 LBS | TEMPERATURE: 97.6 F | RESPIRATION RATE: 14 BRPM | DIASTOLIC BLOOD PRESSURE: 53 MMHG | BODY MASS INDEX: 36.58 KG/M2 | HEART RATE: 70 BPM | SYSTOLIC BLOOD PRESSURE: 130 MMHG

## 2023-11-03 DIAGNOSIS — N39.0 RECURRENT UTI: ICD-10-CM

## 2023-11-03 DIAGNOSIS — G47.33 OBSTRUCTIVE SLEEP APNEA: ICD-10-CM

## 2023-11-03 DIAGNOSIS — Z71.85 VACCINE COUNSELING: ICD-10-CM

## 2023-11-03 DIAGNOSIS — J30.89 NON-SEASONAL ALLERGIC RHINITIS DUE TO OTHER ALLERGIC TRIGGER: ICD-10-CM

## 2023-11-03 DIAGNOSIS — J44.9 COPD, SEVERE (HCC): Primary | ICD-10-CM

## 2023-11-03 PROCEDURE — 3075F SYST BP GE 130 - 139MM HG: CPT | Performed by: NURSE PRACTITIONER

## 2023-11-03 PROCEDURE — G8427 DOCREV CUR MEDS BY ELIG CLIN: HCPCS | Performed by: NURSE PRACTITIONER

## 2023-11-03 PROCEDURE — G8484 FLU IMMUNIZE NO ADMIN: HCPCS | Performed by: NURSE PRACTITIONER

## 2023-11-03 PROCEDURE — 1123F ACP DISCUSS/DSCN MKR DOCD: CPT | Performed by: NURSE PRACTITIONER

## 2023-11-03 PROCEDURE — 3023F SPIROM DOC REV: CPT | Performed by: NURSE PRACTITIONER

## 2023-11-03 PROCEDURE — G8417 CALC BMI ABV UP PARAM F/U: HCPCS | Performed by: NURSE PRACTITIONER

## 2023-11-03 PROCEDURE — 4004F PT TOBACCO SCREEN RCVD TLK: CPT | Performed by: NURSE PRACTITIONER

## 2023-11-03 PROCEDURE — 99214 OFFICE O/P EST MOD 30 MIN: CPT | Performed by: NURSE PRACTITIONER

## 2023-11-03 PROCEDURE — 3078F DIAST BP <80 MM HG: CPT | Performed by: NURSE PRACTITIONER

## 2023-11-03 RX ORDER — FLUTICASONE PROPIONATE AND SALMETEROL 250; 50 UG/1; UG/1
POWDER RESPIRATORY (INHALATION)
Qty: 1 EACH | Refills: 11 | Status: SHIPPED | OUTPATIENT
Start: 2023-11-03

## 2023-11-03 RX ORDER — ALBUTEROL SULFATE 2.5 MG/3ML
SOLUTION RESPIRATORY (INHALATION)
Qty: 360 ML | Refills: 11 | Status: SHIPPED | OUTPATIENT
Start: 2023-11-03

## 2023-11-03 RX ORDER — MONTELUKAST SODIUM 10 MG/1
10 TABLET ORAL NIGHTLY
Qty: 90 TABLET | Refills: 3 | Status: SHIPPED | OUTPATIENT
Start: 2023-11-03

## 2023-11-03 RX ORDER — ALBUTEROL SULFATE 90 UG/1
AEROSOL, METERED RESPIRATORY (INHALATION)
Qty: 18 G | Refills: 11 | Status: SHIPPED | OUTPATIENT
Start: 2023-11-03

## 2023-11-03 ASSESSMENT — ENCOUNTER SYMPTOMS
SHORTNESS OF BREATH: 0
HEMOPTYSIS: 0
WHEEZING: 0
COUGH: 0
SPUTUM PRODUCTION: 0

## 2023-11-03 NOTE — PATIENT INSTRUCTIONS
Albuterol inhaler or nebulizer up to 4 times daily if needed for shortness of breath, wheezing, cough/chest congestion and mucus clearance. Continue Advair 1 inhalation twice daily, rinse mouth after use. Flutter valve up to 4 times daily following nebulizer treatment if needed for cough/chest congestion and mucus clearance. OTC mucolytic, (mucinex or generic equivalent of guaifenesin), 2400mg in 24 hours in divided doses as needed to thin mucous. Continue Singulair 10 mg at bedtime, Flonase nasal spray 1 to 2 sprays each nostril daily. Can add OTC generic Claritin or Zyrtec, 1 daily as needed for control of nasal drainage. Continue CPAP as prescribed. He is up-to-date on seasonal flu vaccine, pneumonia vaccines and RSV vaccine. Recommend newest COVID booster.

## 2023-11-03 NOTE — PROGRESS NOTES
He is a 69-year-old male seen for follow-up. To recap, he was admitted 2/6/2023 with acute respiratory failure with hypoxia, hospital-acquired pneumonia of RLL. Note prior hospitalization 12/29/2022 with COPD exacerbation, influenza, bacteremia, acute hypoxic respiratory failure. He was seen by ID. Seen in hospital follow-up visit 2/2023, reported marked improvement symptomatically, recommendations for follow-up x-ray in 6 weeks. CXR 4/10/2023 demonstrated clearing of pneumonia. Unfortunately, since about visit, he was readmitted to [facility] 6/19/2023 with altered mental status, hypoxia, acute respiratory failure. Initially was felt to be secondary to UTI but urine returned clear. CXR 6/19/20234941-[id number]-orriolfqunxk clear lungs. CT of chest 6/19/2023 Pat-demonstrated tree-in-bud opacities in RUL, possibly infectious or inflammatory. DIRK 6/23 demonstrated no evidence of vegetation. Subsequent ER visit at [facility] with UTI with hematuria. DIAGNOSTICS:        Spirometry 5/2013: FEV1 1.70 Final FEV1 % Pred 46 % Final FVC 2.55 Final FVC % Pred 52 % Final FEV1/F; Significant  interval decline in FVC and FEV (previously 67% and 58% of predicted, or 3.25 and 2.16 L respectively). Chest CT 10/2014 - Negative for pulmonary embolus; basilar atelectasis. Stable left adrenal mass. Spirometry:  10/12/2015 - Moderate restrictive defect, significant interval improvement in FVC  and FEV1. Spirometry 4/11/0216 - moderate restrictive and obstructive defects, no significant interval change. CXR 10/2016-linear density in both lung bases. Could reflect some atelectasis and/or scarring  Spirometry 11/17/2016 - moderate restrictive defect. Spirometry 2/14/2018-moderate obstructive and restrictive defects, no significant interval change. CXR 2/8/2019-suboptimal exposure Technique. Atelectasis or consolidation left lower lobe.    Spirometry 2/20/2019 interval decline in FVC, no significant change in FEV1  CXR
mouth daily    fluticasone-salmeterol (ADVAIR) 250-50 MCG/ACT AEPB diskus inhaler 1 inhalation twice daily, rinse mouth after use    fluticasone (FLONASE) 50 MCG/ACT nasal spray USE 1 OR 2 SPRAYS IN EACH NOSTRIL EVERY DAY. montelukast (SINGULAIR) 10 MG tablet TAKE 1 TABLET BY MOUTH EVERY DAY AT BEDTIME    magnesium oxide (MAG-OX) 400 (240 Mg) MG tablet TAKE 1 TABLET BY MOUTH EVERY DAY    albuterol (PROVENTIL) (2.5 MG/3ML) 0.083% nebulizer solution 1 vial via nebulizer 4 times daily if needed for shortness of breath or wheezing. Diagnosis-COPD, J44.9. Bill to Medicare part B    senna-docusate (Ramirez Moreno) 8.6-50 MG per tablet Take 1 tablet by mouth nightly    CPAP Machine MISC by Does not apply route    albuterol sulfate HFA (PROVENTIL;VENTOLIN;PROAIR) 108 (90 Base) MCG/ACT inhaler USE 2 PUFFS BY INHALATION 4 TIMES DAILY AS NEEDED FOR WHEEZING OR SHORTNESS OF BREATH. Patient prefers ventolin. GUAIFENESIN 1200 PO Take 1 tablet by mouth every 12 hours as needed    acetaminophen (TYLENOL) 500 MG tablet Take 1 tablet by mouth every 6 hours as needed for Pain Taking 1/2 two times daily    latanoprost (XALATAN) 0.005 % ophthalmic solution Apply 1 drop to eye nightly    nitroGLYCERIN (NITROSTAT) 0.4 MG SL tablet Place 1 tablet under the tongue     No current facility-administered medications for this visit. Over 50% of today's office visit was spent in face to face time reviewing test results, prognosis, importance of compliance, education about disease process, benefits of medications, instructions for management of acute symptoms, and follow up plans. Electronically signed. Dictated using voice recognition software.   Proof read but unrecognized errors may exist.

## 2023-11-06 ENCOUNTER — TELEPHONE (OUTPATIENT)
Dept: VASCULAR SURGERY | Age: 78
End: 2023-11-06

## 2023-11-06 NOTE — TELEPHONE ENCOUNTER
Pt called to [facility] appt (didn't want to have surgery)    (Last seen 11/14/22 surgery was suggested at that appt)    **per Dr Mckenzie Jackson - if he does not have a wound or pain no need for appt    -per pt he does not have a wound or pain, I informed him no need for appt, if things change and he needs us he will call to [facility] appt

## 2023-11-07 ENCOUNTER — OFFICE VISIT (OUTPATIENT)
Dept: UROLOGY | Age: 78
End: 2023-11-07
Payer: MEDICARE

## 2023-11-07 DIAGNOSIS — N40.1 BPH WITH OBSTRUCTION/LOWER URINARY TRACT SYMPTOMS: ICD-10-CM

## 2023-11-07 DIAGNOSIS — N13.8 BPH WITH OBSTRUCTION/LOWER URINARY TRACT SYMPTOMS: ICD-10-CM

## 2023-11-07 DIAGNOSIS — N39.0 RECURRENT UTI: Primary | ICD-10-CM

## 2023-11-07 PROCEDURE — G8484 FLU IMMUNIZE NO ADMIN: HCPCS | Performed by: NURSE PRACTITIONER

## 2023-11-07 PROCEDURE — 4004F PT TOBACCO SCREEN RCVD TLK: CPT | Performed by: NURSE PRACTITIONER

## 2023-11-07 PROCEDURE — 99204 OFFICE O/P NEW MOD 45 MIN: CPT | Performed by: NURSE PRACTITIONER

## 2023-11-07 PROCEDURE — G8427 DOCREV CUR MEDS BY ELIG CLIN: HCPCS | Performed by: NURSE PRACTITIONER

## 2023-11-07 PROCEDURE — 1123F ACP DISCUSS/DSCN MKR DOCD: CPT | Performed by: NURSE PRACTITIONER

## 2023-11-07 PROCEDURE — G8417 CALC BMI ABV UP PARAM F/U: HCPCS | Performed by: NURSE PRACTITIONER

## 2023-11-07 ASSESSMENT — ENCOUNTER SYMPTOMS
EYE DISCHARGE: 0
COUGH: 0
ABDOMINAL PAIN: 0
SHORTNESS OF BREATH: 0
CONSTIPATION: 0
INDIGESTION: 0
DIARRHEA: 0
BLOOD IN STOOL: 0
EYE PAIN: 0
NAUSEA: 0
SKIN LESIONS: 0
BACK PAIN: 0
HEARTBURN: 0
VOMITING: 0
WHEEZING: 0

## 2023-11-07 NOTE — PROGRESS NOTES
hard at all   Food Insecurity: No Food Insecurity (2/15/2023)    Hunger Vital Sign     Worried About Running Out of Food in the Last Year: Never true     Ran Out of Food in the Last Year: Never true   Transportation Needs: Unknown (2/15/2023)    PRAPARE - Transportation     Lack of Transportation (Medical): Not on file     Lack of Transportation (Non-Medical): No   Physical Activity: Inactive (10/4/2023)    Exercise Vital Sign     Days of Exercise per Week: 0 days     Minutes of Exercise per Session: 0 min   Stress: Not on file   Social Connections: Not on file   Intimate Partner Violence: Not on file   Housing Stability: Unknown (2/15/2023)    Housing Stability Vital Sign     Unable to Pay for Housing in the Last Year: Not on file     Number of Places Lived in the Last Year: Not on file     Unstable Housing in the Last Year: No     Family History   Problem Relation Age of Onset    Breast Cancer Mother     Hypertension Mother     Other Father         DIVERTICULITIS    Crohn's Disease Sister     Lung Disease Brother         mesothioloma    Diabetes Sister     Heart Attack Father     Heart Disease Father     Stroke Mother        Review of Systems  Constitutional:   Negative for fever, chills, appetite change, malaise/fatigue, headaches and weight loss. Skin:  Negative for skin lesions, rash and itching. Eyes:  Negative for visual disturbance, eye pain and eye discharge. ENT:  Negative for difficulty articulating words, pain swallowing, high frequency hearing loss and dry mouth. Respiratory:  Negative for cough, blood in sputum, shortness of breath and wheezing. Cardiovascular:  Negative for chest pain, hypertension, irregular heartbeat, leg pain, leg swelling, regular rate and rhythm and varicose veins. GI:  Negative for nausea, vomiting, abdominal pain, blood in stool, constipation, diarrhea, indigestion and heartburn.   Genitourinary: Positive for recurrent UTIs, nocturia, straining, urgency, leakage w/ urge,

## 2023-11-08 ENCOUNTER — HOSPITAL ENCOUNTER (EMERGENCY)
Age: 78
Discharge: HOME | End: 2023-11-08
Payer: MEDICARE

## 2023-11-08 ENCOUNTER — TELEPHONE (OUTPATIENT)
Dept: UROLOGY | Age: 78
End: 2023-11-08

## 2023-11-08 VITALS
DIASTOLIC BLOOD PRESSURE: 76 MMHG | HEIGHT: 73 IN | HEART RATE: 71 BPM | OXYGEN SATURATION: 94 % | BODY MASS INDEX: 36.58 KG/M2 | RESPIRATION RATE: 16 BRPM | WEIGHT: 276 LBS | TEMPERATURE: 97.9 F | SYSTOLIC BLOOD PRESSURE: 136 MMHG

## 2023-11-08 DIAGNOSIS — N39.0 URINARY TRACT INFECTION WITHOUT HEMATURIA, SITE UNSPECIFIED: Primary | ICD-10-CM

## 2023-11-08 LAB
ALBUMIN SERPL-MCNC: 3.9 G/DL (ref 3.2–4.6)
ALBUMIN/GLOB SERPL: 1.1 (ref 0.4–1.6)
ALP SERPL-CCNC: 75 U/L (ref 50–136)
ALT SERPL-CCNC: 27 U/L (ref 12–65)
ANION GAP SERPL CALC-SCNC: 7 MMOL/L (ref 2–11)
APPEARANCE UR: CLEAR
AST SERPL-CCNC: 11 U/L (ref 15–37)
BACTERIA URNS QL MICRO: ABNORMAL /HPF
BASOPHILS # BLD: 0.1 K/UL (ref 0–0.2)
BASOPHILS NFR BLD: 0 % (ref 0–2)
BILIRUB SERPL-MCNC: 0.8 MG/DL (ref 0.2–1.1)
BILIRUB UR QL: NEGATIVE
BILIRUB UR QL: NEGATIVE
BUN SERPL-MCNC: 17 MG/DL (ref 8–23)
CALCIUM SERPL-MCNC: 9.3 MG/DL (ref 8.3–10.4)
CHLORIDE SERPL-SCNC: 107 MMOL/L (ref 101–110)
CO2 SERPL-SCNC: 25 MMOL/L (ref 21–32)
COLOR UR: ABNORMAL
CREAT SERPL-MCNC: 1.4 MG/DL (ref 0.8–1.5)
DIFFERENTIAL METHOD BLD: ABNORMAL
EOSINOPHIL # BLD: 0 K/UL (ref 0–0.8)
EOSINOPHIL NFR BLD: 0 % (ref 0.5–7.8)
EPI CELLS #/AREA URNS HPF: ABNORMAL /HPF
ERYTHROCYTE [DISTWIDTH] IN BLOOD BY AUTOMATED COUNT: 16.9 % (ref 11.9–14.6)
GLOBULIN SER CALC-MCNC: 3.7 G/DL (ref 2.8–4.5)
GLUCOSE SERPL-MCNC: 126 MG/DL (ref 65–100)
GLUCOSE UR QL STRIP.AUTO: NEGATIVE MG/DL
GLUCOSE UR STRIP.AUTO-MCNC: NEGATIVE MG/DL
HCT VFR BLD AUTO: 35.2 % (ref 41.1–50.3)
HGB BLD-MCNC: 11.9 G/DL (ref 13.6–17.2)
HGB UR QL STRIP: NEGATIVE
IMM GRANULOCYTES # BLD AUTO: 0.1 K/UL (ref 0–0.5)
IMM GRANULOCYTES NFR BLD AUTO: 1 % (ref 0–5)
KETONES UR QL STRIP.AUTO: NEGATIVE MG/DL
KETONES UR-MCNC: NEGATIVE MG/DL
LEUKOCYTE ESTERASE UR QL STRIP.AUTO: ABNORMAL
LEUKOCYTE ESTERASE UR QL STRIP: ABNORMAL
LYMPHOCYTES # BLD: 1.4 K/UL (ref 0.5–4.6)
LYMPHOCYTES NFR BLD: 10 % (ref 13–44)
MCH RBC QN AUTO: 32.6 PG (ref 26.1–32.9)
MCHC RBC AUTO-ENTMCNC: 33.8 G/DL (ref 31.4–35)
MCV RBC AUTO: 96.4 FL (ref 82–102)
MONOCYTES # BLD: 1.4 K/UL (ref 0.1–1.3)
MONOCYTES NFR BLD: 10 % (ref 4–12)
NEUTS SEG # BLD: 11.4 K/UL (ref 1.7–8.2)
NEUTS SEG NFR BLD: 79 % (ref 43–78)
NITRITE UR QL STRIP.AUTO: NEGATIVE
NITRITE UR QL: NEGATIVE
NRBC # BLD: 0 K/UL (ref 0–0.2)
OTHER OBSERVATIONS: ABNORMAL
PH UR STRIP: 5 (ref 5–9)
PH UR: 5 (ref 5–9)
PLATELET # BLD AUTO: 169 K/UL (ref 150–450)
PMV BLD AUTO: 10.8 FL (ref 9.4–12.3)
POTASSIUM SERPL-SCNC: 4.2 MMOL/L (ref 3.5–5.1)
PROCALCITONIN SERPL-MCNC: <0.05 NG/ML (ref 0–0.49)
PROT SERPL-MCNC: 7.6 G/DL (ref 6.3–8.2)
PROT UR QL: NEGATIVE MG/DL
PROT UR STRIP-MCNC: NEGATIVE MG/DL
RBC # BLD AUTO: 3.65 M/UL (ref 4.23–5.6)
RBC # UR STRIP: NEGATIVE
RBC #/AREA URNS HPF: ABNORMAL /HPF
SERVICE CMNT-IMP: ABNORMAL
SODIUM SERPL-SCNC: 139 MMOL/L (ref 133–143)
SP GR UR REFRACTOMETRY: 1.01 (ref 1–1.02)
SP GR UR: 1.01 (ref 1–1.02)
UROBILINOGEN UR QL STRIP.AUTO: 1 EU/DL (ref 0.2–1)
UROBILINOGEN UR QL: 1 EU/DL (ref 0.2–1)
WBC # BLD AUTO: 14.4 K/UL (ref 4.3–11.1)
WBC URNS QL MICRO: ABNORMAL /HPF

## 2023-11-08 PROCEDURE — 85025 COMPLETE CBC W/AUTO DIFF WBC: CPT

## 2023-11-08 PROCEDURE — 81001 URINALYSIS AUTO W/SCOPE: CPT

## 2023-11-08 PROCEDURE — 81003 URINALYSIS AUTO W/O SCOPE: CPT

## 2023-11-08 PROCEDURE — 96365 THER/PROPH/DIAG IV INF INIT: CPT

## 2023-11-08 PROCEDURE — 2580000003 HC RX 258: Performed by: PHYSICIAN ASSISTANT

## 2023-11-08 PROCEDURE — 80053 COMPREHEN METABOLIC PANEL: CPT

## 2023-11-08 PROCEDURE — 99284 EMERGENCY DEPT VISIT MOD MDM: CPT

## 2023-11-08 PROCEDURE — 87086 URINE CULTURE/COLONY COUNT: CPT

## 2023-11-08 PROCEDURE — 84145 PROCALCITONIN (PCT): CPT

## 2023-11-08 PROCEDURE — 6360000002 HC RX W HCPCS: Performed by: PHYSICIAN ASSISTANT

## 2023-11-08 RX ORDER — CEFPODOXIME PROXETIL 200 MG/1
200 TABLET, FILM COATED ORAL 2 TIMES DAILY
Qty: 14 TABLET | Refills: 0 | Status: SHIPPED | OUTPATIENT
Start: 2023-11-08 | End: 2023-11-15

## 2023-11-08 RX ADMIN — CEFTRIAXONE 1000 MG: 1 INJECTION, POWDER, FOR SOLUTION INTRAMUSCULAR; INTRAVENOUS at 19:59

## 2023-11-08 ASSESSMENT — PAIN - FUNCTIONAL ASSESSMENT: PAIN_FUNCTIONAL_ASSESSMENT: NONE - DENIES PAIN

## 2023-11-08 ASSESSMENT — LIFESTYLE VARIABLES
HOW MANY STANDARD DRINKS CONTAINING ALCOHOL DO YOU HAVE ON A TYPICAL DAY: PATIENT DOES NOT DRINK
HOW OFTEN DO YOU HAVE A DRINK CONTAINING ALCOHOL: NEVER

## 2023-11-08 NOTE — TELEPHONE ENCOUNTER
Wife called to let know pt have fever of 101.4 w/ frequency  [place] advise pt to go to [facility] or ER  fever 101.4/weakness  Spoke with pt wife she understood

## 2023-11-08 NOTE — ED TRIAGE NOTES
Pt arrives in [facility] w/ cc urinary frequency and retention since yesterday. Pt states that he can only urinate small amounts several times a day. Pt endorses recurrent UTIs and just finished a course of antibiotics last monday. Pt denies dysuria, hematuria, fever. Pt is followed by urology and is scheduled for a scope.  Pt A&O x 4

## 2023-11-08 NOTE — ED PROVIDER NOTES
Emergency Department Provider Note       PCP: Anjel Wilder MD   Age: 77 y.o. Sex: male     DISPOSITION Decision To Discharge 11/08/2023 07:55:44 PM       ICD-10-CM    1. Urinary tract infection without hematuria, site unspecified  N39.0           Medical Decision Making     Complexity of Problems Addressed:  Complexity of Problem: 1 acute complicated illness or injury. Data Reviewed and Analyzed:   I independently ordered and reviewed each unique test.  I reviewed external records: ED visit note from an outside group. I reviewed external records: provider visit note from PCP. I reviewed external records: previous lab results from outside ED. Discussion of management or test interpretation. Here with complaint of urinary frequency and burning with urination. Has frequent UTIs. Labs are remarkable for a mildly elevated white count of 14.4. All other labs within normal limits. His urine does show signs of UTI with moderate leukocyte esterase plus 2+ urine. There are 3 epithelial cells. He was given 1 g of Rocephin here via IV and will be sent home on Vantin. He understands he may return here for worsening or worrisome symptoms otherwise he should follow-up with his urologist.    Risk of Complications and/or Morbidity of Patient Management:  Prescription drug management performed. Patient was discharged risks and benefits of hospitalization were considered. Shared medical decision making was utilized in creating the patients health plan today. History      Chandu Pérez is a 77 y.o. male who presents to the Emergency Department with chief complaint of    Chief Complaint   Patient presents with    Urinary Frequency      Patient brought in by wife for complaint of urinary frequency and retention that began yesterday. He does endorse frequent UTIs and this is typically how they present. He does not have any burning with urination fever or hematuria.   He is followed by urology and and

## 2023-11-09 ENCOUNTER — TELEPHONE (OUTPATIENT)
Dept: UROLOGY | Age: 78
End: 2023-11-09

## 2023-11-09 NOTE — TELEPHONE ENCOUNTER
Called and spoke w pts wife this am after ER visit yesterday. Prescribed vantin. He is now afebrile. Feeling better. Will follow Ucx. Will begin antibiotic suppression once culture results. Push fluids. Keep scheduled appt for cysto.    Russ Fregoso, AARON - CNP

## 2023-11-09 NOTE — DISCHARGE INSTRUCTIONS
Your work-up today does show that you have a urinary tract infection. I have given you a dose of IV antibiotics here. Only since you are on oral antibiotics. Please take them to completion. Additionally, follow-up with your primary care physician. If you have worsening or worrisome symptoms that develop, please return here promptly for reevaluation.

## 2023-11-10 ENCOUNTER — CARE COORDINATION (OUTPATIENT)
Dept: CARE COORDINATION | Facility: CLINIC | Age: 78
End: 2023-11-10

## 2023-11-10 NOTE — CARE COORDINATION
Outreached to patient and spouse, Nolvia Negron, for CCM assessment of ED visit on 2023 for UTI  Patient is doing ok at this time - per spouse symptoms will go away while on antibiotics and after he has been off antibiotics for a few days his symptoms will reappear  Patient is scheduled for a cystoscopy on 2023 - but spouse stated that if patient is having urinary symptoms that it will be postponed  All f/u apts have been scheduled   Will f/u with patient and spouse again next week   Ambulatory Care Coordination Note  11/10/2023    Patient Current Location:  Home: [address] [place] [address] [facility]    ACM contacted the patient and spouse/partner by telephone. Verified name and  with patient and spouse/partner as identifiers. Provided introduction to self, and explanation of the ACM role. ACM: Melva Jackson RN, BSN, [facility] [phone]    Challenges to be reviewed by the provider   Additional needs identified to be addressed with provider: No  none               Method of communication with provider: none. Ambulatory Care Coordination Assessment    Care Coordination Protocol  Referral from Primary Care Provider: No  Week 1 - Initial Assessment     Do you have all of your prescriptions and are they filled?: Yes  Barriers to medication adherence: None  Are you able to afford your medications?: Yes  How often do you have trouble taking your medications the way you have been told to take them?: I always take them as prescribed. Ability to seek help/take action for Emergent Urgent situations i.e. fire, crime, inclement weather or health crisis. : Independent  Ability to ambulate to restroom: Independent  Ability handle personal hygeine needs (bathing/dressing/grooming): Independent  Ability to manage Medications: Independent  Ability to prepare Food Preparation: Independent  Ability to maintain home (clean home, laundry):  Independent  Ability to drive and/or has transportation:

## 2023-11-12 LAB
BACTERIA SPEC CULT: ABNORMAL
BACTERIA SPEC CULT: ABNORMAL
SERVICE CMNT-IMP: ABNORMAL

## 2023-11-13 ENCOUNTER — TELEPHONE (OUTPATIENT)
Dept: UROLOGY | Age: 78
End: 2023-11-13

## 2023-11-13 DIAGNOSIS — N39.0 RECURRENT UTI: Primary | ICD-10-CM

## 2023-11-13 RX ORDER — TRIMETHOPRIM 100 MG/1
100 TABLET ORAL DAILY
Qty: 90 TABLET | Refills: 0 | Status: SHIPPED | OUTPATIENT
Start: 2023-11-13

## 2023-11-13 NOTE — TELEPHONE ENCOUNTER
LDVM for pt   Florian Julio sending trimethoprim 100 mg daily to prevent UTI. Can start this once vantin is completed. Cont taking until cysto appt.

## 2023-11-13 NOTE — TELEPHONE ENCOUNTER
Urine culture results reviewed. Will begin trimethoprim suppression until pt has cysto to ensure urine remains clear.    Jess Finley, APRN - CNP

## 2023-11-14 ENCOUNTER — TELEPHONE (OUTPATIENT)
Dept: UROLOGY | Age: 78
End: 2023-11-14

## 2023-11-17 ENCOUNTER — CARE COORDINATION (OUTPATIENT)
Dept: CARE COORDINATION | Facility: CLINIC | Age: 78
End: 2023-11-17

## 2023-11-17 NOTE — CARE COORDINATION
Attempted outreach to patient and spouse, Osmar Chan, for CCM assessment of ED visit on 11/8/2023 for UTI  UTR and left HIPPA compliant message  Will attempt another outreach

## 2023-11-20 ENCOUNTER — CARE COORDINATION (OUTPATIENT)
Dept: CARE COORDINATION | Facility: CLINIC | Age: 78
End: 2023-11-20

## 2023-11-20 NOTE — CARE COORDINATION
Outreached to patient and spouse, Quin Elmore, for CCM assessment of ED visit on 11/8/2023 for UTI  Patient is doing ok at this time   Patient was prescribed trimethoprim antibiotic on 11/13/2023 to help prevent urinary symptoms until he has the cystoscopy on 12/13/2023  (per spouse symptoms will go away while on antibiotics and after he has been off antibiotics for a few days his symptoms will reappear)  All f/u apts have been scheduled   Will f/u with patient and spouse again next week

## 2023-11-29 ENCOUNTER — CARE COORDINATION (OUTPATIENT)
Dept: CARE COORDINATION | Facility: CLINIC | Age: 78
End: 2023-11-29

## 2023-11-29 NOTE — CARE COORDINATION
Outreached to patient and spouse, Archie Sheth, for f/u CCM assessment of ED visit on 11/8/2023 for UTI  Patient was prescribed trimethoprim antibiotic on 11/13/2023 to help prevent urinary symptoms until he has the cystoscopy on 12/13/2023  (per spouse symptoms will go away while on antibiotics and after he has been off antibiotics for a few days his symptoms will reappear)  Patient is doing good at this time and not having any s/sx's of UTI at this time  All f/u apts have been scheduled   Will f/u with patient and spouse again next week    Goals        Medication Management      I will take my medication as directed.     Barriers: none  Plan for overcoming my barriers: Patient's spouse states patient will take medication as directed  Confidence: 9/10  Anticipated Goal Completion Date: within 30 days

## 2023-12-06 ENCOUNTER — CARE COORDINATION (OUTPATIENT)
Dept: CARE COORDINATION | Facility: CLINIC | Age: 78
End: 2023-12-06

## 2023-12-06 NOTE — CARE COORDINATION
Attempted outreach to patient and spouse, Yesenia Juarez, for f/u CCM assessment of ED visit on 11/8/2023 for UTI  UTR and unable to leave VM message due to mailbox is full   Per [facility] notes patient appears to be in [facility] this week and has been seen by a doctor in [facility]  Will f/u with patient and spouse again next week

## 2023-12-07 RX ORDER — CLOPIDOGREL BISULFATE 75 MG/1
TABLET ORAL
Qty: 90 TABLET | Refills: 1 | Status: SHIPPED | OUTPATIENT
Start: 2023-12-07

## 2023-12-13 ENCOUNTER — PROCEDURE VISIT (OUTPATIENT)
Dept: UROLOGY | Age: 78
End: 2023-12-13
Payer: MEDICARE

## 2023-12-13 DIAGNOSIS — N13.8 BPH WITH OBSTRUCTION/LOWER URINARY TRACT SYMPTOMS: ICD-10-CM

## 2023-12-13 DIAGNOSIS — N40.1 BPH WITH OBSTRUCTION/LOWER URINARY TRACT SYMPTOMS: ICD-10-CM

## 2023-12-13 DIAGNOSIS — N39.0 URINARY TRACT INFECTION WITHOUT HEMATURIA, SITE UNSPECIFIED: Primary | ICD-10-CM

## 2023-12-13 DIAGNOSIS — B37.89 CANDIDA RASH OF GROIN: ICD-10-CM

## 2023-12-13 LAB
BILIRUBIN, URINE, POC: NEGATIVE
BLOOD URINE, POC: NORMAL
GLUCOSE URINE, POC: NEGATIVE
KETONES, URINE, POC: NEGATIVE
LEUKOCYTE ESTERASE, URINE, POC: NORMAL
NITRITE, URINE, POC: NEGATIVE
PH, URINE, POC: 5.5 (ref 4.6–8)
PROTEIN,URINE, POC: NEGATIVE
SPECIFIC GRAVITY, URINE, POC: 1.02 (ref 1–1.03)
URINALYSIS CLARITY, POC: NORMAL
URINALYSIS COLOR, POC: NORMAL
UROBILINOGEN, POC: NORMAL

## 2023-12-13 PROCEDURE — 99214 OFFICE O/P EST MOD 30 MIN: CPT | Performed by: UROLOGY

## 2023-12-13 PROCEDURE — G8427 DOCREV CUR MEDS BY ELIG CLIN: HCPCS | Performed by: UROLOGY

## 2023-12-13 PROCEDURE — 52000 CYSTOURETHROSCOPY: CPT | Performed by: UROLOGY

## 2023-12-13 PROCEDURE — G8484 FLU IMMUNIZE NO ADMIN: HCPCS | Performed by: UROLOGY

## 2023-12-13 PROCEDURE — G8417 CALC BMI ABV UP PARAM F/U: HCPCS | Performed by: UROLOGY

## 2023-12-13 PROCEDURE — 81003 URINALYSIS AUTO W/O SCOPE: CPT | Performed by: UROLOGY

## 2023-12-13 PROCEDURE — 1123F ACP DISCUSS/DSCN MKR DOCD: CPT | Performed by: UROLOGY

## 2023-12-13 PROCEDURE — 4004F PT TOBACCO SCREEN RCVD TLK: CPT | Performed by: UROLOGY

## 2023-12-13 RX ORDER — NYSTATIN 100000 [USP'U]/G
POWDER TOPICAL
Qty: 1 EACH | Refills: 3 | Status: SHIPPED | OUTPATIENT
Start: 2023-12-13

## 2023-12-13 NOTE — PROGRESS NOTES
Negative    Bilirubin, Urine, POC Negative Negative    Ketones, Urine, POC Negative Negative    Specific Gravity, Urine, POC 1.020 1.001 - 1.035    Blood, Urine, POC Trace Negative    pH, Urine, POC 5.0 4.6 - 8.0    Protein, Urine, POC 1+ Negative    Urobilinogen, POC 0.2 mg/dL     Nitrite, Urine, POC Positive Negative    Leukocyte Esterase, Urine, POC Moderate Negative         Cystoscopy Procedure:     Procedure Room # 3  Scope ID: dispo  Assistant:         Procedure in Detail:  After local anesthesia was administered, Maria Del Carmen Lopez was prepped and draped in the usual sterile fashion. A flexible cystoscope was used. Findings:  Urethra: Normal without strictures  Prostate: severely obstructed from lateral lobe enlargement, 2.5 cm  Bladder neck: open  Bladder mucosa: Intact  Trebeculation: none  Diverticula: none  Ureteral orifices: normal  Stent(s):None    Estimated Blood Loss:  Minimal to none        Assessment and Plan    ICD-10-CM    1. Urinary tract infection without hematuria, site unspecified  N39.0       2. BPH with obstruction/lower urinary tract symptoms  N40.1 CYSTOURETHROSCOPY    N13.8 AMB POC URINALYSIS DIP STICK AUTO W/O MICRO      3. Candida rash of groin  B37.89 nystatin (MYCOSTATIN) 270175 UNIT/GM powder            If LUTS worsened, he would be a candidate for urolift IF he could be cleared. In regards to uti's, he will continue daily TMP. In regards to groin crease candidal infection, nystatin powder was prescribed. He will see our NP in 3 mo for follow up. I have spent 30 minutes today reviewing previous notes, test results and face to face with the patient as well as documenting. Patient had a complete established visit today that is separately identifiable from procedure performed today. Complete documentation of this separate visit is present.

## 2023-12-14 ENCOUNTER — CARE COORDINATION (OUTPATIENT)
Dept: CARE COORDINATION | Facility: CLINIC | Age: 78
End: 2023-12-14

## 2023-12-14 NOTE — CARE COORDINATION
Attempted outreach to patient and spouse, Alex Wayne, for f/u CCM assessment of ED visit on 11/8/2023 for UTI  UTR and unable to leave VM message due to mailbox is full   Per [facility] notes patient had cystoscope performed on 12/13/2023  Will f/u with patient and spouse again next week

## 2024-01-02 ENCOUNTER — TELEPHONE (OUTPATIENT)
Dept: PULMONOLOGY | Age: 79
End: 2024-01-02

## 2024-01-02 ENCOUNTER — HOSPITAL ENCOUNTER (EMERGENCY)
Age: 79
Discharge: HOME | End: 2024-01-02
Payer: MEDICARE

## 2024-01-02 ENCOUNTER — APPOINTMENT (OUTPATIENT)
Dept: GENERAL RADIOLOGY | Age: 79
End: 2024-01-02
Payer: MEDICARE

## 2024-01-02 VITALS
TEMPERATURE: 98.3 F | SYSTOLIC BLOOD PRESSURE: 117 MMHG | HEART RATE: 64 BPM | HEIGHT: 73 IN | BODY MASS INDEX: 35.78 KG/M2 | DIASTOLIC BLOOD PRESSURE: 67 MMHG | WEIGHT: 270 LBS | OXYGEN SATURATION: 94 % | RESPIRATION RATE: 16 BRPM

## 2024-01-02 DIAGNOSIS — N30.00 ACUTE CYSTITIS WITHOUT HEMATURIA: Primary | ICD-10-CM

## 2024-01-02 DIAGNOSIS — J44.1 COPD EXACERBATION (HCC): ICD-10-CM

## 2024-01-02 LAB
ALBUMIN SERPL-MCNC: 3.7 G/DL (ref 3.2–4.6)
ALBUMIN/GLOB SERPL: 1 (ref 0.4–1.6)
ALP SERPL-CCNC: 73 U/L (ref 50–136)
ALT SERPL-CCNC: 21 U/L (ref 12–65)
ANION GAP SERPL CALC-SCNC: 4 MMOL/L (ref 2–11)
APPEARANCE UR: CLEAR
AST SERPL-CCNC: 14 U/L (ref 15–37)
BACTERIA URNS QL MICRO: NEGATIVE /HPF
BASOPHILS # BLD: 0.1 K/UL (ref 0–0.2)
BASOPHILS NFR BLD: 0 % (ref 0–2)
BILIRUB SERPL-MCNC: 0.8 MG/DL (ref 0.2–1.1)
BILIRUB UR QL: NEGATIVE
BUN SERPL-MCNC: 20 MG/DL (ref 8–23)
CALCIUM SERPL-MCNC: 9 MG/DL (ref 8.3–10.4)
CASTS URNS QL MICRO: ABNORMAL /LPF
CHLORIDE SERPL-SCNC: 102 MMOL/L (ref 103–113)
CO2 SERPL-SCNC: 25 MMOL/L (ref 21–32)
COLOR UR: ABNORMAL
CREAT SERPL-MCNC: 1.4 MG/DL (ref 0.8–1.5)
DIFFERENTIAL METHOD BLD: ABNORMAL
EOSINOPHIL # BLD: 0 K/UL (ref 0–0.8)
EOSINOPHIL NFR BLD: 0 % (ref 0.5–7.8)
EPI CELLS #/AREA URNS HPF: ABNORMAL /HPF
ERYTHROCYTE [DISTWIDTH] IN BLOOD BY AUTOMATED COUNT: 16 % (ref 11.9–14.6)
GLOBULIN SER CALC-MCNC: 3.8 G/DL (ref 2.8–4.5)
GLUCOSE SERPL-MCNC: 163 MG/DL (ref 65–100)
GLUCOSE UR STRIP.AUTO-MCNC: NEGATIVE MG/DL
HCT VFR BLD AUTO: 32.9 % (ref 41.1–50.3)
HGB BLD-MCNC: 11 G/DL (ref 13.6–17.2)
HGB UR QL STRIP: NEGATIVE
IMM GRANULOCYTES # BLD AUTO: 0.1 K/UL (ref 0–0.5)
IMM GRANULOCYTES NFR BLD AUTO: 1 % (ref 0–5)
KETONES UR QL STRIP.AUTO: ABNORMAL MG/DL
LEUKOCYTE ESTERASE UR QL STRIP.AUTO: ABNORMAL
LYMPHOCYTES # BLD: 1.2 K/UL (ref 0.5–4.6)
LYMPHOCYTES NFR BLD: 8 % (ref 13–44)
MAGNESIUM SERPL-MCNC: 2.2 MG/DL (ref 1.8–2.4)
MCH RBC QN AUTO: 32.4 PG (ref 26.1–32.9)
MCHC RBC AUTO-ENTMCNC: 33.4 G/DL (ref 31.4–35)
MCV RBC AUTO: 97.1 FL (ref 82–102)
MONOCYTES # BLD: 1.3 K/UL (ref 0.1–1.3)
MONOCYTES NFR BLD: 9 % (ref 4–12)
NEUTS SEG # BLD: 11.6 K/UL (ref 1.7–8.2)
NEUTS SEG NFR BLD: 82 % (ref 43–78)
NITRITE UR QL STRIP.AUTO: NEGATIVE
NRBC # BLD: 0.02 K/UL (ref 0–0.2)
PH UR STRIP: 5 (ref 5–9)
PLATELET # BLD AUTO: 170 K/UL (ref 150–450)
PMV BLD AUTO: 10.4 FL (ref 9.4–12.3)
POTASSIUM SERPL-SCNC: 4.4 MMOL/L (ref 3.5–5.1)
PROT SERPL-MCNC: 7.5 G/DL (ref 6.3–8.2)
PROT UR STRIP-MCNC: ABNORMAL MG/DL
RBC # BLD AUTO: 3.39 M/UL (ref 4.23–5.6)
RBC #/AREA URNS HPF: ABNORMAL /HPF
SODIUM SERPL-SCNC: 131 MMOL/L (ref 136–146)
SP GR UR REFRACTOMETRY: 1.02 (ref 1–1.02)
TROPONIN I SERPL HS-MCNC: 10.4 PG/ML (ref 0–14)
UROBILINOGEN UR QL STRIP.AUTO: 1 EU/DL (ref 0.2–1)
WBC # BLD AUTO: 14.2 K/UL (ref 4.3–11.1)
WBC URNS QL MICRO: ABNORMAL /HPF

## 2024-01-02 PROCEDURE — 6370000000 HC RX 637 (ALT 250 FOR IP)

## 2024-01-02 PROCEDURE — 6360000002 HC RX W HCPCS

## 2024-01-02 PROCEDURE — 96374 THER/PROPH/DIAG INJ IV PUSH: CPT

## 2024-01-02 PROCEDURE — 99285 EMERGENCY DEPT VISIT HI MDM: CPT

## 2024-01-02 PROCEDURE — 93005 ELECTROCARDIOGRAM TRACING: CPT

## 2024-01-02 PROCEDURE — 80053 COMPREHEN METABOLIC PANEL: CPT

## 2024-01-02 PROCEDURE — 71046 X-RAY EXAM CHEST 2 VIEWS: CPT

## 2024-01-02 PROCEDURE — 84484 ASSAY OF TROPONIN QUANT: CPT

## 2024-01-02 PROCEDURE — 2580000003 HC RX 258

## 2024-01-02 PROCEDURE — 87086 URINE CULTURE/COLONY COUNT: CPT

## 2024-01-02 PROCEDURE — 81001 URINALYSIS AUTO W/SCOPE: CPT

## 2024-01-02 PROCEDURE — 83735 ASSAY OF MAGNESIUM: CPT

## 2024-01-02 PROCEDURE — 85025 COMPLETE CBC W/AUTO DIFF WBC: CPT

## 2024-01-02 RX ORDER — CEFPODOXIME PROXETIL 200 MG/1
200 TABLET, FILM COATED ORAL 2 TIMES DAILY
Qty: 20 TABLET | Refills: 0 | Status: SHIPPED | OUTPATIENT
Start: 2024-01-02 | End: 2024-01-12

## 2024-01-02 RX ORDER — PREDNISONE 20 MG/1
TABLET ORAL
Qty: 6 TABLET | Refills: 0 | Status: SHIPPED | OUTPATIENT
Start: 2024-01-02

## 2024-01-02 RX ORDER — IPRATROPIUM BROMIDE AND ALBUTEROL SULFATE 2.5; .5 MG/3ML; MG/3ML
1 SOLUTION RESPIRATORY (INHALATION)
Status: COMPLETED | OUTPATIENT
Start: 2024-01-02 | End: 2024-01-02

## 2024-01-02 RX ORDER — ACETAMINOPHEN 325 MG/1
650 TABLET ORAL
Status: COMPLETED | OUTPATIENT
Start: 2024-01-02 | End: 2024-01-02

## 2024-01-02 RX ADMIN — ACETAMINOPHEN 650 MG: 325 TABLET ORAL at 19:15

## 2024-01-02 RX ADMIN — IPRATROPIUM BROMIDE AND ALBUTEROL SULFATE 1 DOSE: 2.5; .5 SOLUTION RESPIRATORY (INHALATION) at 21:11

## 2024-01-02 RX ADMIN — WATER 1000 MG: 1 INJECTION INTRAMUSCULAR; INTRAVENOUS; SUBCUTANEOUS at 21:37

## 2024-01-02 ASSESSMENT — ENCOUNTER SYMPTOMS
ABDOMINAL PAIN: 0
SHORTNESS OF BREATH: 1
VOMITING: 0
CHEST TIGHTNESS: 0
WHEEZING: 1
CONSTIPATION: 0
NAUSEA: 0
COUGH: 1

## 2024-01-02 ASSESSMENT — PAIN - FUNCTIONAL ASSESSMENT: PAIN_FUNCTIONAL_ASSESSMENT: NONE - DENIES PAIN

## 2024-01-02 ASSESSMENT — LIFESTYLE VARIABLES
HOW MANY STANDARD DRINKS CONTAINING ALCOHOL DO YOU HAVE ON A TYPICAL DAY: 1 OR 2
HOW OFTEN DO YOU HAVE A DRINK CONTAINING ALCOHOL: MONTHLY OR LESS

## 2024-01-02 ASSESSMENT — PAIN SCALES - GENERAL: PAINLEVEL_OUTOF10: 0

## 2024-01-02 NOTE — TELEPHONE ENCOUNTER
Due to high cases of flu, RSV, and COVID in our area, consider viral testing/urgent care visit.    He should use albuterol via nebulizer qid until back to baseline.    OTC mucolytic, (mucinex or generic equivalent of guaifenesin), 2400mg in 24 hours in divided doses as needed to thin mucous.    I have sent in modified dose of prednisone taper (has DM).    If he does not improve, needs to be seen.

## 2024-01-02 NOTE — TELEPHONE ENCOUNTER
TRIAGE CALL      Complaint: cough  Cough: yes  Productive:  yes / white chalky  Bloody Sputum:  no  Increased SOB/Wheezing:  wheezing  Duration: about 3 wks   Fever/Chills: low grade   100.4  OTC Meds tried: musinex      The cough has gotten worse . And has just started having some low grade fever

## 2024-01-02 NOTE — ED PROVIDER NOTES
Emergency Department Provider Note       PCP: Lexi Nieto MD   Age: 78 y.o.   Sex: male     DISPOSITION Decision To Discharge 01/02/2024 09:58:20 PM       ICD-10-CM    1. Acute cystitis without hematuria  [facility]30.00 [facility]      2. COPD exacerbation (HCC)  J44.1           Medical Decision Making     Complexity of Problems Addressed:  1 or more chronic illnesses with a severe exacerbation or progression.  1 or more acute illnesses that pose a threat to life or bodily function.     Data Reviewed and Analyzed:   I independently ordered and reviewed each unique test.  I reviewed external records: provider visit note from outside specialist.   The patients assessment required an independent historian: patient's wife.  The reason they were needed is important historical information not provided by the patient.    I independently ordered and interpreted the ED EKG in the absence of a Cardiologist.    Rate: 71  EKG Interpretation: EKG Interpretation: sinus rhythm, no evidence of arrhythmia  ST Segments: Normal ST segments - NO STEMI      I interpreted the X-rays No acute consolidation seen on CXR, in agreement with radiologist interpretation.    Discussion of management or test interpretation.  This patient is a 78-year-old male with a past medical history of COPD, diabetes, and recent UTI who presents today with his wife due to increased fatigue, productive cough, and concern for UTI.  Patient presents in no acute distress.  He is vitally stable.  Physical exam of the patient reveals expiratory wheezing in all lung fields.  He states that this is fairly typical for his than COPD however he is feeling just slightly more short of breath than typical.  Physical exam is otherwise unremarkable.  His CBC shows an elevated WBC at 14.2 but patient is not meeting 2 SIRS criteria at this time.  CMP is unremarkable.  Urinalysis is concerning for UTI so urine was sent for culture and patient

## 2024-01-02 NOTE — ED TRIAGE NOTES
Patient arrives to ED pov from home. Patient reports generalized weakness, cough, no appetite, and possible fever. Patient has history of COPD. Patient reports frequent urination. Patient recently had a UTI on christmas.

## 2024-01-02 NOTE — TELEPHONE ENCOUNTER
LOV 11/3/23 with Analia NP--COPD, allergic rhinitis, SHERI on CPAP    Allergies:azithromycin, ciprofloxacin, morphine, oxaprozin, oxycodone, 2,4-d Dimethylamine     Pt calling to report that he has had about 3 weeks of cough productive of chalky white mucus. No SOB but does have wheezing. Recently developed low grade fever with tmax being 100.4F. Is taking Mucinex, Singulair, Advair, albuterol, and Zyrtec. States that cough has gotten worse since it started.

## 2024-01-03 LAB
EKG ATRIAL RATE: 71 BPM
EKG DIAGNOSIS: NORMAL
EKG P AXIS: 85 DEGREES
EKG P-R INTERVAL: 186 MS
EKG Q-T INTERVAL: 408 MS
EKG QRS DURATION: 104 MS
EKG QTC CALCULATION (BAZETT): 443 MS
EKG R AXIS: 79 DEGREES
EKG T AXIS: 69 DEGREES
EKG VENTRICULAR RATE: 71 BPM

## 2024-01-03 PROCEDURE — 93010 ELECTROCARDIOGRAM REPORT: CPT | Performed by: INTERNAL MEDICINE

## 2024-01-03 NOTE — TELEPHONE ENCOUNTER
Able to reach spouse, reports patient continued to have problems to include balance & ambulation, summoned EMS and was evaluated in ER. No respiratory issues to treat. Continues with UTI. Grateful for f/u call.

## 2024-01-03 NOTE — ED NOTES
I have reviewed discharge instructions with the patient.  The patient verbalized understanding.    Patient left ED via Discharge Method: ambulatory to Home with family.    Opportunity for questions and clarification provided.       Patient given 1 scripts.         To continue your aftercare when you leave the hospital, you may receive an automated call from our care team to check in on how you are doing.  This is a free service and part of our promise to provide the best care and service to meet your aftercare needs.” If you have questions, or wish to unsubscribe from this service please call [phone].  Thank you for Choosing our [facility] Emergency Department.        Katharina Curran RN  01/02/24 [id number]

## 2024-01-03 NOTE — DISCHARGE INSTRUCTIONS
Please take the antibiotics as prescribed even if you are feeling better for both your COPD exacerbation and your UTI.    Follow up with both your PCP and your urologist.    If your symptoms change or worsen, return immediately to the emergency department.

## 2024-01-04 ENCOUNTER — OFFICE VISIT (OUTPATIENT)
Dept: INTERNAL MEDICINE CLINIC | Facility: CLINIC | Age: 79
End: 2024-01-04
Payer: MEDICARE

## 2024-01-04 VITALS
TEMPERATURE: 98.2 F | DIASTOLIC BLOOD PRESSURE: 53 MMHG | BODY MASS INDEX: 36.71 KG/M2 | SYSTOLIC BLOOD PRESSURE: 130 MMHG | HEIGHT: 73 IN | WEIGHT: 277 LBS | OXYGEN SATURATION: 97 % | HEART RATE: 60 BPM

## 2024-01-04 DIAGNOSIS — J44.9 CHRONIC OBSTRUCTIVE PULMONARY DISEASE, UNSPECIFIED COPD TYPE (HCC): ICD-10-CM

## 2024-01-04 DIAGNOSIS — D72.829 LEUKOCYTOSIS, UNSPECIFIED TYPE: ICD-10-CM

## 2024-01-04 DIAGNOSIS — E87.1 HYPONATREMIA: ICD-10-CM

## 2024-01-04 DIAGNOSIS — N39.0 RECURRENT UTI: Primary | ICD-10-CM

## 2024-01-04 DIAGNOSIS — I50.22 CHRONIC SYSTOLIC (CONGESTIVE) HEART FAILURE (HCC): ICD-10-CM

## 2024-01-04 LAB
BASOPHILS # BLD: 0 K/UL (ref 0–0.2)
BASOPHILS NFR BLD: 0 % (ref 0–2)
DIFFERENTIAL METHOD BLD: ABNORMAL
EOSINOPHIL # BLD: 0 K/UL (ref 0–0.8)
EOSINOPHIL NFR BLD: 0 % (ref 0.5–7.8)
ERYTHROCYTE [DISTWIDTH] IN BLOOD BY AUTOMATED COUNT: 15.9 % (ref 11.9–14.6)
HCT VFR BLD AUTO: 31.8 % (ref 41.1–50.3)
HGB BLD-MCNC: 10.6 G/DL (ref 13.6–17.2)
IMM GRANULOCYTES # BLD AUTO: 0 K/UL (ref 0–0.5)
IMM GRANULOCYTES NFR BLD AUTO: 1 % (ref 0–5)
LYMPHOCYTES # BLD: 0.7 K/UL (ref 0.5–4.6)
LYMPHOCYTES NFR BLD: 8 % (ref 13–44)
MCH RBC QN AUTO: 32.4 PG (ref 26.1–32.9)
MCHC RBC AUTO-ENTMCNC: 33.3 G/DL (ref 31.4–35)
MCV RBC AUTO: 97.2 FL (ref 82–102)
MONOCYTES # BLD: 0.2 K/UL (ref 0.1–1.3)
MONOCYTES NFR BLD: 3 % (ref 4–12)
NEUTS SEG # BLD: 7.4 K/UL (ref 1.7–8.2)
NEUTS SEG NFR BLD: 88 % (ref 43–78)
NRBC # BLD: 0 K/UL (ref 0–0.2)
PLATELET # BLD AUTO: 189 K/UL (ref 150–450)
PMV BLD AUTO: 10.9 FL (ref 9.4–12.3)
RBC # BLD AUTO: 3.27 M/UL (ref 4.23–5.6)
WBC # BLD AUTO: 8.4 K/UL (ref 4.3–11.1)

## 2024-01-04 PROCEDURE — G8427 DOCREV CUR MEDS BY ELIG CLIN: HCPCS | Performed by: NURSE PRACTITIONER

## 2024-01-04 PROCEDURE — 3023F SPIROM DOC REV: CPT | Performed by: NURSE PRACTITIONER

## 2024-01-04 PROCEDURE — 3078F DIAST BP <80 MM HG: CPT | Performed by: NURSE PRACTITIONER

## 2024-01-04 PROCEDURE — G8417 CALC BMI ABV UP PARAM F/U: HCPCS | Performed by: NURSE PRACTITIONER

## 2024-01-04 PROCEDURE — 3075F SYST BP GE 130 - 139MM HG: CPT | Performed by: NURSE PRACTITIONER

## 2024-01-04 PROCEDURE — 99214 OFFICE O/P EST MOD 30 MIN: CPT | Performed by: NURSE PRACTITIONER

## 2024-01-04 PROCEDURE — 1123F ACP DISCUSS/DSCN MKR DOCD: CPT | Performed by: NURSE PRACTITIONER

## 2024-01-04 PROCEDURE — G8484 FLU IMMUNIZE NO ADMIN: HCPCS | Performed by: NURSE PRACTITIONER

## 2024-01-04 PROCEDURE — 4004F PT TOBACCO SCREEN RCVD TLK: CPT | Performed by: NURSE PRACTITIONER

## 2024-01-04 ASSESSMENT — ENCOUNTER SYMPTOMS
NAUSEA: 0
CHEST TIGHTNESS: 0
SORE THROAT: 0
COUGH: 1
VOMITING: 0
SHORTNESS OF BREATH: 0
WHEEZING: 1

## 2024-01-04 NOTE — PROGRESS NOTES
1/4/2024 1:36 PM  Location:[facility] PHYSICIAN SERVICES  [facility] INTERNAL MEDICINE  [place]  Patient #:  [id number]  YOB: 1945          YOUR LAST HEMOGLOBIN A1CS:   No results found for: \"HBA1C\", \"[id number]\"    YOUR LAST LIPID PROFILE:   Lab Results   Component Value Date/Time    CHOL 134 06/08/2023 09:18 AM    HDL 63 06/08/2023 09:18 AM    VLDL 18 09/21/2021 02:54 PM         Lab Results   Component Value Date/Time    GFRAA >60 09/16/2022 04:42 AM    BUN 20 01/02/2024 06:59 PM     01/02/2024 06:59 PM    K 4.4 01/02/2024 06:59 PM     01/02/2024 06:59 PM    CO2 25 01/02/2024 06:59 PM           History of Present Illness     Chief Complaint   Patient presents with    3 Month Follow-Up     3 month follow-up    Frequent/Recurrent UTI     Recurrent uti. Pt started having symptoms 2 days ago. Fever and urinary frequency.        Mr. Guillen is a 78 y.o. male  who presents for the above mentioned complaints.  Mr. Guillen presents for 3-month follow-up as well as ER follow-up.  He was seen on Tuesday for recurrent UTI and COPD exacerbation.  He had a negative chest x-ray, was given a steroid burst and was put on Vantin for his UTI symptoms.  His initial urine culture is negative, has follow-up with urology on January 15.  He also takes Sulfatrim daily for chronic UTI.    Reports wheezing increased since November, waxes and wanes, was a long-term smoker.  Denies purulent sputum, fever or URI symptoms.  Labs in the ER showed hyponatremia and leukocytosis.  Reports that he is eating well, sleeping well, denies chest pain, leg swelling, shortness of breath.           Allergies   Allergen Reactions    2,4-D Dimethylamine     Azithromycin Hives    Ciprofloxacin      Instructed by cardiologist to not take    Morphine Hallucinations     Muscle twitching. jerking  Other reaction(s): Hallucinations, Hallucinations-Intolerance  Muscle twitching. jerking  Muscle twitching. jerking    Oxaprozin Other (See

## 2024-01-05 ENCOUNTER — TELEPHONE (OUTPATIENT)
Dept: INTERNAL MEDICINE CLINIC | Facility: CLINIC | Age: 79
End: 2024-01-05

## 2024-01-05 DIAGNOSIS — D50.9 IRON DEFICIENCY ANEMIA, UNSPECIFIED IRON DEFICIENCY ANEMIA TYPE: Primary | ICD-10-CM

## 2024-01-05 DIAGNOSIS — E11.21 TYPE 2 DIABETES WITH NEPHROPATHY (HCC): ICD-10-CM

## 2024-01-05 LAB
ANION GAP SERPL CALC-SCNC: 7 MMOL/L (ref 2–11)
BACTERIA SPEC CULT: NORMAL
BACTERIA SPEC CULT: NORMAL
BUN SERPL-MCNC: 21 MG/DL (ref 8–23)
CALCIUM SERPL-MCNC: 8.9 MG/DL (ref 8.3–10.4)
CHLORIDE SERPL-SCNC: 105 MMOL/L (ref 103–113)
CO2 SERPL-SCNC: 24 MMOL/L (ref 21–32)
CREAT SERPL-MCNC: 1.5 MG/DL (ref 0.8–1.5)
GLUCOSE SERPL-MCNC: 251 MG/DL (ref 65–100)
POTASSIUM SERPL-SCNC: 4.9 MMOL/L (ref 3.5–5.1)
SERVICE CMNT-IMP: NORMAL
SODIUM SERPL-SCNC: 136 MMOL/L (ref 136–146)

## 2024-01-05 NOTE — TELEPHONE ENCOUNTER
Mr. Holt-  The labs show no elevated wbc which is great news. The hemoglobin is at 10.6. Continue the iron and please fill out FIT test.   Let's recheck cbc in one month just to make sure things are staying stable.   Please let us know if you develop  any new or worsening sx.  Janay

## 2024-01-05 NOTE — TELEPHONE ENCOUNTER
Mr. Holt-  The labs show no elevated wbc which is great news. The hemoglobin is at 10.6. Continue the iron and please fill out FIT test.   Let's recheck cbc in one month just to make sure things are staying stable.   Also, the glucose was very elevated at 251. We are going to recheck A1c at your follow up. Watch your blood sugars and avoid sugar and processed foods.   Please let us know if you develop  any new or worsening sx.  Janay

## 2024-01-08 RX ORDER — GLIPIZIDE 5 MG/1
5 TABLET, FILM COATED, EXTENDED RELEASE ORAL DAILY
Qty: 90 TABLET | Refills: 3 | Status: SHIPPED | OUTPATIENT
Start: 2024-01-08

## 2024-01-08 NOTE — TELEPHONE ENCOUNTER
This has been fully explained to the patient, who indicates understanding. FIT card left up front and follow-up lab scheduled

## 2024-01-10 ENCOUNTER — CARE COORDINATION (OUTPATIENT)
Dept: CARE COORDINATION | Facility: CLINIC | Age: 79
End: 2024-01-10

## 2024-01-10 ENCOUNTER — ENROLLMENT (OUTPATIENT)
Dept: CARE COORDINATION | Facility: CLINIC | Age: 79
End: 2024-01-10

## 2024-01-10 NOTE — CARE COORDINATION
learning difficulties, concentration): Clear and open communication, no identified barriers   Do other services need to be involved to help this patient?: Other care/services in place and adequate   Are current services involved with this patient well-coordinated? (Include coordination with other services you are now recommendation): Required care/services in place and adequately coordinated   Suggested Interventions and Community Resources                  Future Appointments   Date Time Provider Department Center   1/15/2024 11:30 AM Keyona Avila, AARON - CNP [id number] [facility] AMB   1/15/2024  2:45 PM Alban Medley MD [facility] AMB   2/8/2024  2:40 PM [facility]IM [facility] AMB   3/13/2024 11:30 AM Keyona Avila, AARON - CNP [id number] [facility] AMB   4/8/2024  2:00 PM Lexi Nieto MD [facility] AMB   5/8/2024  1:40 PM Merary Helms, AARON - CNP PPS [facility] AMB      and   General Assessment    Do you have any symptoms that are causing concern?: Yes  Progression since Onset: Unchanged  Reported Symptoms: Other

## 2024-01-15 ENCOUNTER — OFFICE VISIT (OUTPATIENT)
Dept: UROLOGY | Age: 79
End: 2024-01-15
Payer: MEDICARE

## 2024-01-15 ENCOUNTER — OFFICE VISIT (OUTPATIENT)
Age: 79
End: 2024-01-15
Payer: MEDICARE

## 2024-01-15 ENCOUNTER — TELEPHONE (OUTPATIENT)
Age: 79
End: 2024-01-15

## 2024-01-15 VITALS
DIASTOLIC BLOOD PRESSURE: 66 MMHG | WEIGHT: 278 LBS | HEIGHT: 73 IN | SYSTOLIC BLOOD PRESSURE: 122 MMHG | BODY MASS INDEX: 36.84 KG/M2 | HEART RATE: 64 BPM

## 2024-01-15 DIAGNOSIS — I48.0 PAROXYSMAL ATRIAL FIBRILLATION (HCC): ICD-10-CM

## 2024-01-15 DIAGNOSIS — N13.8 BPH WITH OBSTRUCTION/LOWER URINARY TRACT SYMPTOMS: ICD-10-CM

## 2024-01-15 DIAGNOSIS — J44.9 COPD, SEVERE (HCC): ICD-10-CM

## 2024-01-15 DIAGNOSIS — I73.9 PAD (PERIPHERAL ARTERY DISEASE) (HCC): ICD-10-CM

## 2024-01-15 DIAGNOSIS — E66.01 SEVERE OBESITY (BMI 35.0-39.9) WITH COMORBIDITY (HCC): ICD-10-CM

## 2024-01-15 DIAGNOSIS — N39.0 RECURRENT UTI: Primary | ICD-10-CM

## 2024-01-15 DIAGNOSIS — I25.10 CORONARY ARTERY DISEASE INVOLVING NATIVE CORONARY ARTERY OF NATIVE HEART WITHOUT ANGINA PECTORIS: ICD-10-CM

## 2024-01-15 DIAGNOSIS — E11.21 TYPE 2 DIABETES WITH NEPHROPATHY (HCC): ICD-10-CM

## 2024-01-15 DIAGNOSIS — Z95.810 ICD (IMPLANTABLE CARDIOVERTER-DEFIBRILLATOR) IN PLACE: Primary | ICD-10-CM

## 2024-01-15 DIAGNOSIS — I47.20 VT (VENTRICULAR TACHYCARDIA) (HCC): ICD-10-CM

## 2024-01-15 DIAGNOSIS — N40.1 BPH WITH OBSTRUCTION/LOWER URINARY TRACT SYMPTOMS: ICD-10-CM

## 2024-01-15 DIAGNOSIS — I25.5 ISCHEMIC CARDIOMYOPATHY: ICD-10-CM

## 2024-01-15 PROBLEM — N18.30 CHRONIC RENAL DISEASE, STAGE III (HCC): Status: ACTIVE | Noted: 2024-01-15

## 2024-01-15 LAB
BILIRUBIN, URINE, POC: NEGATIVE
BLOOD URINE, POC: NEGATIVE
GLUCOSE URINE, POC: NEGATIVE
KETONES, URINE, POC: NEGATIVE
LEUKOCYTE ESTERASE, URINE, POC: NORMAL
NITRITE, URINE, POC: NEGATIVE
PH, URINE, POC: 6 (ref 4.6–8)
PROTEIN,URINE, POC: NEGATIVE
SPECIFIC GRAVITY, URINE, POC: 1.02 (ref 1–1.03)
URINALYSIS CLARITY, POC: NORMAL
URINALYSIS COLOR, POC: NORMAL
UROBILINOGEN, POC: NORMAL

## 2024-01-15 PROCEDURE — 1123F ACP DISCUSS/DSCN MKR DOCD: CPT | Performed by: INTERNAL MEDICINE

## 2024-01-15 PROCEDURE — 1123F ACP DISCUSS/DSCN MKR DOCD: CPT | Performed by: NURSE PRACTITIONER

## 2024-01-15 PROCEDURE — 99214 OFFICE O/P EST MOD 30 MIN: CPT | Performed by: NURSE PRACTITIONER

## 2024-01-15 PROCEDURE — 3023F SPIROM DOC REV: CPT | Performed by: INTERNAL MEDICINE

## 2024-01-15 PROCEDURE — 4004F PT TOBACCO SCREEN RCVD TLK: CPT | Performed by: NURSE PRACTITIONER

## 2024-01-15 PROCEDURE — 99214 OFFICE O/P EST MOD 30 MIN: CPT | Performed by: INTERNAL MEDICINE

## 2024-01-15 PROCEDURE — G8427 DOCREV CUR MEDS BY ELIG CLIN: HCPCS | Performed by: INTERNAL MEDICINE

## 2024-01-15 PROCEDURE — 3074F SYST BP LT 130 MM HG: CPT | Performed by: INTERNAL MEDICINE

## 2024-01-15 PROCEDURE — 81003 URINALYSIS AUTO W/O SCOPE: CPT | Performed by: NURSE PRACTITIONER

## 2024-01-15 PROCEDURE — 3078F DIAST BP <80 MM HG: CPT | Performed by: INTERNAL MEDICINE

## 2024-01-15 PROCEDURE — 4004F PT TOBACCO SCREEN RCVD TLK: CPT | Performed by: INTERNAL MEDICINE

## 2024-01-15 PROCEDURE — G8484 FLU IMMUNIZE NO ADMIN: HCPCS | Performed by: NURSE PRACTITIONER

## 2024-01-15 PROCEDURE — G8484 FLU IMMUNIZE NO ADMIN: HCPCS | Performed by: INTERNAL MEDICINE

## 2024-01-15 PROCEDURE — G8417 CALC BMI ABV UP PARAM F/U: HCPCS | Performed by: NURSE PRACTITIONER

## 2024-01-15 PROCEDURE — G8427 DOCREV CUR MEDS BY ELIG CLIN: HCPCS | Performed by: NURSE PRACTITIONER

## 2024-01-15 PROCEDURE — G8417 CALC BMI ABV UP PARAM F/U: HCPCS | Performed by: INTERNAL MEDICINE

## 2024-01-15 RX ORDER — TRIMETHOPRIM 100 MG/1
100 TABLET ORAL DAILY
Qty: 90 TABLET | Refills: 1 | Status: SHIPPED | OUTPATIENT
Start: 2024-01-15

## 2024-01-15 ASSESSMENT — ENCOUNTER SYMPTOMS
DIARRHEA: 0
WHEEZING: 0
SHORTNESS OF BREATH: 1
BOWEL INCONTINENCE: 0
HEMATEMESIS: 0
HEMATOCHEZIA: 0
BLURRED VISION: 0
ABDOMINAL PAIN: 0
CHEST TIGHTNESS: 0
HOARSE VOICE: 0
COLOR CHANGE: 0
ORTHOPNEA: 0
BACK PAIN: 0
SPUTUM PRODUCTION: 0

## 2024-01-15 NOTE — TELEPHONE ENCOUNTER
Called and left voicemail for patient stating that we are receiving regular remote transmissions from his ICD and we will continue monitoring him remotely.  Next office device check not due until late August 2024 and we will notify him with date and time for that appointment once we get closer.

## 2024-01-15 NOTE — PROGRESS NOTES
[facility]  [address] [address]  [phone]          Wilfred Guillen  : 1945    Chief Complaint   Patient presents with    Urinary Tract Infection    Follow-up          HPI     Wilfred Guillen is a 78 y.o. male  w hx of AF on Eliquis/plavix and DM2 referred by Dr. Lexi Nieto for recurrent UTI. Upon chart review, pt had E.Coli UTI in Sept (in ER) and Oct 23. Sx include UF, fever, chills, and AMS. Denies pneumaturia. TIMOTHY in Aug 22 showed normal urinary tract. Cr 1.50.      Has baseline LUTS. Flomax started sev months ago. This seems to help stream. Int constipation relieved w diet and colace. Heavy caffeine intake.      PVR 80 cc via u/s.      Daily TMP started in  and has not had a UTI since starting.  He does have a rash in B groin creases. Cysto in Dec 23 by Dr. Cabezas showed severely obstructing lateral lobe of prostate; candidate for urolift.     To ER in  24 w c/o weakness and fever. Dx w UTI. Vantin started. Ucx contaminated.     Asx today.     Lab Results   Component Value Date    PSA 2.3 2022       Past Medical History:   Diagnosis Date    Acute respiratory failure with hypoxia ([facility]) 10/23/2014    MINI (acute kidney injury) ([facility]) 09/15/2014    MINI (acute kidney injury) ([facility])     MINI (acute kidney injury) ([facility])     Allergic rhinitis 11/10/2015    Asthma 11/10/2015    Benign essential hypertension 11/10/2015    Benign neoplasm of colon 11/10/2015    CAD (coronary artery disease) 11/10/2015    CAD (coronary artery disease) ,     mix2, 3 stents [id number]    CAP (community acquired pneumonia) 2020    Cardiomyopathy ([facility]) 11/10/2015    Complicated wound infection 11/10/2015    COPD (chronic obstructive pulmonary disease) with emphysema ([facility]) 11/10/2015    COPD exacerbation ([facility]) 10/12/2011    COVID-19 2020    Diabetes mellitus type 2, controlled ([facility]) 11/10/2015    doesn/t check daily oral meds, A1c 6.0 (8/10/22)

## 2024-01-15 NOTE — PROGRESS NOTES
[facility] CARDIOLOG[facility]  [address] [place] [address], [address]  [place] [address]  PHONE: [phone]        01/15/24        NAME:  Wilfred Guillen  : 1945  MRN: [id number]       SUBJECTIVE:   Wilfred Guillen is a 78 y.o. male seen for a follow up visit regarding the following: The patient has CAD,PAD,PAF,VT,COPD,and an ischemic cardiomyopathy with ICD.Echo in 2023 reported LV EF=55-60%.He returns for scheduled follow up.Overall,he reports doing ok except recent issues with BPH and recurrent UTI's.    Chief Complaint   Patient presents with    Coronary Artery Disease       HPI:    Coronary Artery Disease  Presents for follow-up visit. Symptoms include shortness of breath (chronic and unchanged). Pertinent negatives include no chest pain, chest pressure, chest tightness, dizziness, leg swelling, muscle weakness, palpitations or weight gain. The symptoms have been stable.       Past Medical History, Past Surgical History, Family history, Social History, and Medications were all reviewed with the patient today and updated as necessary.         Current Outpatient Medications:     trimethoprim (TRIMPEX) 100 MG tablet, Take 1 tablet by mouth daily, Disp: 90 tablet, Rfl: 1    glipiZIDE (GLUCOTROL XL) 5 MG extended release tablet, TAKE 1 TABLET BY MOUTH EVERY DAY, Disp: 90 tablet, Rfl: 3    predniSONE (DELTASONE) 20 MG tablet, 1 po q am pc for 4 days, 1/2 for 4 days, then stop, Disp: 6 tablet, Rfl: 0    FEROSUL 325 (65 Fe) MG tablet, TAKE 1 TABLET BY MOUTH EVERY DAY WITH BREAKFAST, Disp: 90 tablet, Rfl: 1    nystatin (MYCOSTATIN) 870763 UNIT/GM powder, Apply 2 times daily as needed., Disp: 1 each, Rfl: 3    clopidogrel (PLAVIX) 75 MG tablet, TAKE 1 TABLET BY MOUTH EVERY DAY, Disp: 90 tablet, Rfl: 1    albuterol (PROVENTIL) (2.5 MG/3ML) 0.083% nebulizer solution, 1 vial via nebulizer 4 times daily if needed for shortness of breath or wheezing.  Diagnosis-COPD, J44.9.  Bill to Medicare part B, Disp: 360 mL, Rfl: 11

## 2024-01-15 NOTE — TELEPHONE ENCOUNTER
----- Message from Chandrika Kevin sent at 1/15/2024  3:02 PM EST -----  Regarding: Device check  Patient and Dr. Medley asking when patients next check will be.  Contact patient to let him know.

## 2024-01-18 ENCOUNTER — TELEPHONE (OUTPATIENT)
Dept: INTERNAL MEDICINE CLINIC | Facility: CLINIC | Age: 79
End: 2024-01-18

## 2024-01-18 RX ORDER — GLIPIZIDE 10 MG/1
10 TABLET, FILM COATED, EXTENDED RELEASE ORAL DAILY
Qty: 90 TABLET | Refills: 3 | Status: SHIPPED | OUTPATIENT
Start: 2024-01-18

## 2024-01-18 NOTE — TELEPHONE ENCOUNTER
Patient wife called and had questions about his sugar I informed what his sugar was when they did labs at 251.   She said he takes glipiZIDE he has always been on 10mg , but stated some how they started to get the 5mg , she said she will start back giving him 10mg  But she wants to know what is the correct dose he needs to be on ?

## 2024-01-18 NOTE — TELEPHONE ENCOUNTER
Take 10 mg.  Sent in higher dosage.  If having episodes of low blood sugars, please revert back to the 5 mg dose and let us know.  Thanks.  [facility]

## 2024-02-05 DIAGNOSIS — I10 PRIMARY HYPERTENSION: ICD-10-CM

## 2024-02-06 RX ORDER — LISINOPRIL 10 MG/1
10 TABLET ORAL DAILY
Qty: 90 TABLET | Refills: 3 | Status: SHIPPED | OUTPATIENT
Start: 2024-02-06

## 2024-02-08 ENCOUNTER — NURSE ONLY (OUTPATIENT)
Dept: INTERNAL MEDICINE CLINIC | Facility: CLINIC | Age: 79
End: 2024-02-08

## 2024-02-08 DIAGNOSIS — E11.21 TYPE 2 DIABETES WITH NEPHROPATHY (HCC): ICD-10-CM

## 2024-02-08 DIAGNOSIS — D50.9 IRON DEFICIENCY ANEMIA, UNSPECIFIED IRON DEFICIENCY ANEMIA TYPE: ICD-10-CM

## 2024-02-08 LAB
BASOPHILS # BLD: 0 K/UL (ref 0–0.2)
BASOPHILS NFR BLD: 1 % (ref 0–2)
DIFFERENTIAL METHOD BLD: ABNORMAL
EOSINOPHIL # BLD: 0 K/UL (ref 0–0.8)
EOSINOPHIL NFR BLD: 1 % (ref 0.5–7.8)
ERYTHROCYTE [DISTWIDTH] IN BLOOD BY AUTOMATED COUNT: 16.4 % (ref 11.9–14.6)
HCT VFR BLD AUTO: 35.1 % (ref 41.1–50.3)
HGB BLD-MCNC: 11.5 G/DL (ref 13.6–17.2)
IMM GRANULOCYTES # BLD AUTO: 0 K/UL (ref 0–0.5)
IMM GRANULOCYTES NFR BLD AUTO: 0 % (ref 0–5)
LYMPHOCYTES # BLD: 1.6 K/UL (ref 0.5–4.6)
LYMPHOCYTES NFR BLD: 25 % (ref 13–44)
MCH RBC QN AUTO: 32.6 PG (ref 26.1–32.9)
MCHC RBC AUTO-ENTMCNC: 32.8 G/DL (ref 31.4–35)
MCV RBC AUTO: 99.4 FL (ref 82–102)
MONOCYTES # BLD: 0.4 K/UL (ref 0.1–1.3)
MONOCYTES NFR BLD: 7 % (ref 4–12)
NEUTS SEG # BLD: 4.4 K/UL (ref 1.7–8.2)
NEUTS SEG NFR BLD: 66 % (ref 43–78)
NRBC # BLD: 0 K/UL (ref 0–0.2)
PLATELET # BLD AUTO: 156 K/UL (ref 150–450)
PMV BLD AUTO: 11.4 FL (ref 9.4–12.3)
RBC # BLD AUTO: 3.53 M/UL (ref 4.23–5.6)
WBC # BLD AUTO: 6.5 K/UL (ref 4.3–11.1)

## 2024-02-09 ENCOUNTER — TELEPHONE (OUTPATIENT)
Dept: INTERNAL MEDICINE CLINIC | Facility: CLINIC | Age: 79
End: 2024-02-09

## 2024-02-09 LAB
EST. AVERAGE GLUCOSE BLD GHB EST-MCNC: 151 MG/DL
HBA1C MFR BLD: 6.9 % (ref 4.8–5.6)

## 2024-02-09 NOTE — TELEPHONE ENCOUNTER
Mr. Holt-  The labs show stability. The hemoglobin is staying good, and is currently at 11.5, so improved from last month.   The A1c is up just a bit at 6.9.   Continue same medications and eating a diabetic diet, avoid processed foods and sugar.   Hope you guys are doing well!  Janay

## 2024-03-04 RX ORDER — AMIODARONE HYDROCHLORIDE 200 MG/1
200 TABLET ORAL DAILY
Qty: 90 TABLET | Refills: 3 | Status: SHIPPED | OUTPATIENT
Start: 2024-03-04

## 2024-03-04 RX ORDER — TAMSULOSIN HYDROCHLORIDE 0.4 MG/1
CAPSULE ORAL DAILY
Qty: 90 CAPSULE | Refills: 3 | Status: SHIPPED | OUTPATIENT
Start: 2024-03-04

## 2024-03-05 RX ORDER — ROSUVASTATIN CALCIUM 10 MG/1
TABLET, COATED ORAL
Qty: 90 TABLET | Refills: 3 | Status: SHIPPED | OUTPATIENT
Start: 2024-03-05

## 2024-03-13 ENCOUNTER — OFFICE VISIT (OUTPATIENT)
Dept: UROLOGY | Age: 79
End: 2024-03-13
Payer: MEDICARE

## 2024-03-13 DIAGNOSIS — N13.8 BPH WITH OBSTRUCTION/LOWER URINARY TRACT SYMPTOMS: ICD-10-CM

## 2024-03-13 DIAGNOSIS — N40.1 BPH WITH OBSTRUCTION/LOWER URINARY TRACT SYMPTOMS: ICD-10-CM

## 2024-03-13 DIAGNOSIS — N39.0 RECURRENT UTI: Primary | ICD-10-CM

## 2024-03-13 LAB
BILIRUBIN, URINE, POC: NEGATIVE
BLOOD URINE, POC: NEGATIVE
GLUCOSE URINE, POC: NEGATIVE
KETONES, URINE, POC: NEGATIVE
LEUKOCYTE ESTERASE, URINE, POC: ABNORMAL
NITRITE, URINE, POC: NEGATIVE
PH, URINE, POC: 5.5 (ref 4.6–8)
PROTEIN,URINE, POC: NEGATIVE
SPECIFIC GRAVITY, URINE, POC: 1.02 (ref 1–1.03)
URINALYSIS CLARITY, POC: ABNORMAL
URINALYSIS COLOR, POC: ABNORMAL
UROBILINOGEN, POC: ABNORMAL

## 2024-03-13 PROCEDURE — 99214 OFFICE O/P EST MOD 30 MIN: CPT | Performed by: NURSE PRACTITIONER

## 2024-03-13 PROCEDURE — G8427 DOCREV CUR MEDS BY ELIG CLIN: HCPCS | Performed by: NURSE PRACTITIONER

## 2024-03-13 PROCEDURE — 81003 URINALYSIS AUTO W/O SCOPE: CPT | Performed by: NURSE PRACTITIONER

## 2024-03-13 PROCEDURE — 1123F ACP DISCUSS/DSCN MKR DOCD: CPT | Performed by: NURSE PRACTITIONER

## 2024-03-13 PROCEDURE — 4004F PT TOBACCO SCREEN RCVD TLK: CPT | Performed by: NURSE PRACTITIONER

## 2024-03-13 PROCEDURE — G8484 FLU IMMUNIZE NO ADMIN: HCPCS | Performed by: NURSE PRACTITIONER

## 2024-03-13 PROCEDURE — G8417 CALC BMI ABV UP PARAM F/U: HCPCS | Performed by: NURSE PRACTITIONER

## 2024-03-13 ASSESSMENT — ENCOUNTER SYMPTOMS: BACK PAIN: 0

## 2024-03-13 NOTE — PROGRESS NOTES
[facility]  [address] [address]  [phone]          Wilfred Guillen  : 1945    Chief Complaint   Patient presents with    Follow-up    Urinary Tract Infection          HPI     Wilfred Guillen is a 78 y.o. male w hx of AF on Eliquis/plavix and DM2 referred by Dr. Lexi Nieto for recurrent UTI. Upon chart review, pt had E.Coli UTI in Sept (in ER) and Oct 23. Sx include UF, fever, chills, and AMS. Denies pneumaturia. TIMOTHY in Aug 22 showed normal urinary tract. Cr 1.50.      Has baseline LUTS. Heavy caffeine intake. Flomax started sev months ago. This seems to help stream. PVR 80 cc via u/s.     Int constipation relieved w diet and colace.     Daily TMP started in  and has not had a UTI since starting. Cysto in Dec 23 by Dr. Cabezas showed severely obstructing lateral lobe of prostate; candidate for urolift.      To ER in  24 w c/o weakness and fever. Dx w UTI. Vantin started. Ucx contaminated.     Back today for fu. No further UTI. Cont to have sig LUTS including stop/start stream and incomplete emptying. Cont w daily TMP.     Lab Results   Component Value Date    PSA 2.3 2022     Cr 1.50.     He has held Eliquis/Plavix in the past wo issue.           Past Medical History:   Diagnosis Date    Acute respiratory failure with hypoxia ([facility]) 10/23/2014    MINI (acute kidney injury) ([facility]) 09/15/2014    MINI (acute kidney injury) ([facility])     MINI (acute kidney injury) ([facility])     Allergic rhinitis 11/10/2015    Asthma 11/10/2015    Benign essential hypertension 11/10/2015    Benign neoplasm of colon 11/10/2015    CAD (coronary artery disease) 11/10/2015    CAD (coronary artery disease) ,     mix2, 3 stents [id number]    CAP (community acquired pneumonia) 2020    Cardiomyopathy ([facility]) 11/10/2015    Complicated wound infection 11/10/2015    COPD (chronic obstructive pulmonary disease) with emphysema ([facility]) 11/10/2015    COPD exacerbation ([facility])

## 2024-03-15 RX ORDER — APIXABAN 5 MG/1
TABLET, FILM COATED ORAL
Qty: 180 TABLET | Refills: 3 | Status: SHIPPED | OUTPATIENT
Start: 2024-03-15

## 2024-03-25 RX ORDER — CARVEDILOL 6.25 MG/1
6.25 TABLET ORAL 2 TIMES DAILY WITH MEALS
Qty: 180 TABLET | Refills: 3 | Status: SHIPPED | OUTPATIENT
Start: 2024-03-25

## 2024-04-08 ENCOUNTER — OFFICE VISIT (OUTPATIENT)
Dept: INTERNAL MEDICINE CLINIC | Facility: CLINIC | Age: 79
End: 2024-04-08
Payer: MEDICARE

## 2024-04-08 ENCOUNTER — TELEPHONE (OUTPATIENT)
Dept: INTERNAL MEDICINE CLINIC | Facility: CLINIC | Age: 79
End: 2024-04-08

## 2024-04-08 VITALS
BODY MASS INDEX: 38.17 KG/M2 | WEIGHT: 288 LBS | HEIGHT: 73 IN | HEART RATE: 60 BPM | OXYGEN SATURATION: 98 % | DIASTOLIC BLOOD PRESSURE: 62 MMHG | RESPIRATION RATE: 18 BRPM | SYSTOLIC BLOOD PRESSURE: 140 MMHG

## 2024-04-08 DIAGNOSIS — Z87.39 HISTORY OF DISCITIS: ICD-10-CM

## 2024-04-08 DIAGNOSIS — G95.19 INTERMITTENT SPINAL CLAUDICATION (HCC): ICD-10-CM

## 2024-04-08 DIAGNOSIS — E11.51 DM (DIABETES MELLITUS) TYPE II, CONTROLLED, WITH PERIPHERAL VASCULAR DISORDER (HCC): ICD-10-CM

## 2024-04-08 DIAGNOSIS — L97.422 NON-PRESSURE CHRONIC ULCER OF LEFT HEEL AND MIDFOOT WITH FAT LAYER EXPOSED (HCC): ICD-10-CM

## 2024-04-08 DIAGNOSIS — M51.36 DDD (DEGENERATIVE DISC DISEASE), LUMBAR: Primary | ICD-10-CM

## 2024-04-08 DIAGNOSIS — I70.222 ATHEROSCLEROSIS OF NATIVE ARTERIES OF EXTREMITIES WITH REST PAIN, LEFT LEG (HCC): ICD-10-CM

## 2024-04-08 DIAGNOSIS — N18.30 STAGE 3 CHRONIC KIDNEY DISEASE, UNSPECIFIED WHETHER STAGE 3A OR 3B CKD (HCC): ICD-10-CM

## 2024-04-08 DIAGNOSIS — G89.29 CHRONIC LOW BACK PAIN, UNSPECIFIED BACK PAIN LATERALITY, UNSPECIFIED WHETHER SCIATICA PRESENT: ICD-10-CM

## 2024-04-08 DIAGNOSIS — M54.50 CHRONIC LOW BACK PAIN, UNSPECIFIED BACK PAIN LATERALITY, UNSPECIFIED WHETHER SCIATICA PRESENT: ICD-10-CM

## 2024-04-08 PROBLEM — J96.01 ACUTE RESPIRATORY FAILURE WITH HYPOXIA (HCC): Status: RESOLVED | Noted: 2023-02-07 | Resolved: 2024-04-08

## 2024-04-08 LAB
ALBUMIN SERPL-MCNC: 3.8 G/DL (ref 3.2–4.6)
ALBUMIN/GLOB SERPL: 1.3 (ref 0.4–1.6)
ALP SERPL-CCNC: 57 U/L (ref 50–136)
ALT SERPL-CCNC: 23 U/L (ref 12–65)
ANION GAP SERPL CALC-SCNC: 2 MMOL/L (ref 2–11)
AST SERPL-CCNC: 13 U/L (ref 15–37)
BILIRUB SERPL-MCNC: 0.5 MG/DL (ref 0.2–1.1)
BUN SERPL-MCNC: 23 MG/DL (ref 8–23)
CALCIUM SERPL-MCNC: 9.1 MG/DL (ref 8.3–10.4)
CHLORIDE SERPL-SCNC: 110 MMOL/L (ref 103–113)
CHOLEST SERPL-MCNC: 101 MG/DL
CO2 SERPL-SCNC: 28 MMOL/L (ref 21–32)
CREAT SERPL-MCNC: 1.5 MG/DL (ref 0.8–1.5)
GLOBULIN SER CALC-MCNC: 2.9 G/DL (ref 2.8–4.5)
GLUCOSE SERPL-MCNC: 109 MG/DL (ref 65–100)
HDLC SERPL-MCNC: 45 MG/DL (ref 40–60)
HDLC SERPL: 2.2
LDLC SERPL CALC-MCNC: 28.6 MG/DL
POTASSIUM SERPL-SCNC: 4.4 MMOL/L (ref 3.5–5.1)
PROT SERPL-MCNC: 6.7 G/DL (ref 6.3–8.2)
SODIUM SERPL-SCNC: 140 MMOL/L (ref 136–146)
TRIGL SERPL-MCNC: 137 MG/DL (ref 35–150)
TSH W FREE THYROID IF ABNORMAL: 2.14 UIU/ML (ref 0.36–3.74)
VLDLC SERPL CALC-MCNC: 27.4 MG/DL (ref 6–23)

## 2024-04-08 PROCEDURE — 3044F HG A1C LEVEL LT 7.0%: CPT | Performed by: INTERNAL MEDICINE

## 2024-04-08 PROCEDURE — 99214 OFFICE O/P EST MOD 30 MIN: CPT | Performed by: INTERNAL MEDICINE

## 2024-04-08 PROCEDURE — G8427 DOCREV CUR MEDS BY ELIG CLIN: HCPCS | Performed by: INTERNAL MEDICINE

## 2024-04-08 PROCEDURE — G8417 CALC BMI ABV UP PARAM F/U: HCPCS | Performed by: INTERNAL MEDICINE

## 2024-04-08 PROCEDURE — 4004F PT TOBACCO SCREEN RCVD TLK: CPT | Performed by: INTERNAL MEDICINE

## 2024-04-08 PROCEDURE — 1123F ACP DISCUSS/DSCN MKR DOCD: CPT | Performed by: INTERNAL MEDICINE

## 2024-04-08 PROCEDURE — 3077F SYST BP >= 140 MM HG: CPT | Performed by: INTERNAL MEDICINE

## 2024-04-08 PROCEDURE — 3078F DIAST BP <80 MM HG: CPT | Performed by: INTERNAL MEDICINE

## 2024-04-08 RX ORDER — FUROSEMIDE 20 MG/1
20 TABLET ORAL DAILY
Qty: 30 TABLET | Refills: 11 | Status: SHIPPED | OUTPATIENT
Start: 2024-04-08

## 2024-04-08 SDOH — ECONOMIC STABILITY: FOOD INSECURITY: WITHIN THE PAST 12 MONTHS, YOU WORRIED THAT YOUR FOOD WOULD RUN OUT BEFORE YOU GOT MONEY TO BUY MORE.: NEVER TRUE

## 2024-04-08 SDOH — ECONOMIC STABILITY: FOOD INSECURITY: WITHIN THE PAST 12 MONTHS, THE FOOD YOU BOUGHT JUST DIDN'T LAST AND YOU DIDN'T HAVE MONEY TO GET MORE.: NEVER TRUE

## 2024-04-08 SDOH — ECONOMIC STABILITY: INCOME INSECURITY: HOW HARD IS IT FOR YOU TO PAY FOR THE VERY BASICS LIKE FOOD, HOUSING, MEDICAL CARE, AND HEATING?: NOT HARD AT ALL

## 2024-04-08 ASSESSMENT — PATIENT HEALTH QUESTIONNAIRE - PHQ9
SUM OF ALL RESPONSES TO PHQ QUESTIONS 1-9: 1
1. LITTLE INTEREST OR PLEASURE IN DOING THINGS: SEVERAL DAYS
SUM OF ALL RESPONSES TO PHQ9 QUESTIONS 1 & 2: 1
2. FEELING DOWN, DEPRESSED OR HOPELESS: NOT AT ALL
SUM OF ALL RESPONSES TO PHQ QUESTIONS 1-9: 1

## 2024-04-08 ASSESSMENT — ENCOUNTER SYMPTOMS
WHEEZING: 1
COUGH: 1
VOMITING: 0
CONSTIPATION: 1
NAUSEA: 0
DIARRHEA: 0
BACK PAIN: 1

## 2024-04-08 NOTE — PROGRESS NOTES
by mouth every 6 hours as needed for Pain Taking 1/2 two times daily      latanoprost (XALATAN) 0.005 % ophthalmic solution Apply 1 drop to eye nightly      nitroGLYCERIN (NITROSTAT) 0.4 MG SL tablet Place 1 tablet under the tongue      furosemide (LASIX) 20 MG tablet TAKE 1 TABLET BY MOUTH EVERY DAY 30 tablet 11     No current facility-administered medications for this visit.     Health Maintenance   Topic Date Due    DTaP/Tdap/Td vaccine (1 - Tdap) Never done    Low dose CT lung screening &/or counseling  Never done    Diabetic retinal exam  02/20/2021    Prostate Specific Antigen (PSA) Screening or Monitoring  05/18/2023    Diabetic foot exam  05/10/2024    Lipids  06/08/2024    A1C test (Diabetic or Prediabetic)  08/08/2024    Depression Screen  10/04/2024    Annual Wellness Visit (Medicare)  10/04/2024    Flu vaccine  Completed    Shingles vaccine  Completed    Pneumococcal 65+ years Vaccine  Completed    COVID-19 Vaccine  Completed    Respiratory Syncytial Virus (RSV) Pregnant or age 60 yrs+  Completed    Hepatitis C screen  Completed    Hepatitis A vaccine  Aged Out    Hepatitis B vaccine  Aged Out    Hib vaccine  Aged Out    Polio vaccine  Aged Out    Meningococcal (ACWY) vaccine  Aged Out     Family History   Problem Relation Age of Onset    Breast Cancer Mother     Hypertension Mother     Other Father         DIVERTICULITIS    Crohn's Disease Sister     Lung Disease Brother         mesothioloma    Diabetes Sister     Heart Attack Father     Heart Disease Father     Stroke Mother              Review of Systems  Review of Systems   Constitutional:  Negative for chills and fever.   Respiratory:  Positive for cough (productive of white sputum) and wheezing.    Gastrointestinal:  Positive for constipation (at times). Negative for diarrhea, nausea and vomiting.        Stool softeners help with this   Genitourinary:  Negative for dysuria and hematuria.        Is seeing a urologist   Musculoskeletal:  Positive for

## 2024-04-08 NOTE — TELEPHONE ENCOUNTER
Mr. Guillen is needing an appointment 4 month appointment. There are  no available appointments in 4 months. Where would you like to add him to your schedule?

## 2024-04-09 NOTE — RESULT ENCOUNTER NOTE
Labs are stable.  Continue your current doses of medications. Keep up the good work.  Thanks.  [facility]

## 2024-04-10 ENCOUNTER — CARE COORDINATION (OUTPATIENT)
Dept: CARE COORDINATION | Facility: CLINIC | Age: 79
End: 2024-04-10

## 2024-04-10 NOTE — CARE COORDINATION
Outreached to patient/patient's spouse Lu for f/u CCM assessment of ED visit on 2024 for acute cystitis without hematuria  Patient is still taking antibiotics for UTI  Patient is awaiting call from urology to schedule a procedure to help with the UTI's   Patient has f/u apts scheduled  Will f/u with patient and spouse again  Ambulatory Care Coordination Note  4/10/2024    Patient Current Location:  Home: [address] [place] [id number]     ACM contacted the patient by telephone. Verified name and  with patient as identifiers. Provided introduction to self, and explanation of the ACM role.     Challenges to be reviewed by the provider   Additional needs identified to be addressed with provider: No  none               Method of communication with provider: none.    ACM: Saba Sullivan RN, BSN, [facility] [phone]    Offered patient enrollment in the Remote Patient Monitoring (RPM) program for in-home monitoring: Patient declined.    Care Coordination Interventions    Referral from Primary Care Provider: No  Suggested Interventions and Community Resources          Goals Addressed                   This Visit's Progress     Conditions and Symptoms        I will schedule office visits, as directed by my provider.  I will keep my appointment or reschedule if I have to cancel.  I will notify my provider of any barriers to my plan of care.  I will follow my Zone Management tool to seek urgent or emergent care.  I will notify my provider of any symptoms that indicate a worsening of my condition.    Barriers: impairment:  physical:    Plan for overcoming my barriers: f/u with PCP and be med compliant  Confidence: 6/10  Anticipated Goal Completion Date: 2024         COMPLETED: Medication Management        I will take my medication as directed.    Barriers: none  Plan for overcoming my barriers: Patient's spouse states patient will take medication as directed  Confidence: 9/10  Anticipated Goal Completion

## 2024-04-12 NOTE — H&P (VIEW-ONLY)
Topics Concern    Not on file   Social History Narrative    Lives with wife     Social Determinants of Health     Financial Resource Strain: Low Risk  (4/8/2024)    Overall Financial Resource Strain (CARDIA)     Difficulty of Paying Living Expenses: Not hard at all   Food Insecurity: No Food Insecurity (4/8/2024)    Hunger Vital Sign     Worried About Running Out of Food in the Last Year: Never true     Ran Out of Food in the Last Year: Never true   Transportation Needs: Unknown (4/8/2024)    PRAPARE - Transportation     Lack of Transportation (Medical): Not on file     Lack of Transportation (Non-Medical): No   Physical Activity: Inactive (10/4/2023)    Exercise Vital Sign     Days of Exercise per Week: 0 days     Minutes of Exercise per Session: 0 min   Stress: Not on file   Social Connections: Not on file   Intimate Partner Violence: Not on file   Housing Stability: Unknown (4/8/2024)    Housing Stability Vital Sign     Unable to Pay for Housing in the Last Year: Not on file     Number of Places Lived in the Last Year: Not on file     Unstable Housing in the Last Year: No     Family History   Problem Relation Age of Onset    Breast Cancer Mother     Hypertension Mother     Other Father         DIVERTICULITIS    Crohn's Disease Sister     Lung Disease Brother         mesothioloma    Diabetes Sister     Heart Attack Father     Heart Disease Father     Stroke Mother        Review of Systems  Constitutional:   Negative for fever.  Genitourinary:  Negative for hematuria.  Musculoskeletal:  Negative for back pain.      Urinalysis  UA - Dipstick  Results for orders placed or performed in visit on 04/15/24   AMB POC URINALYSIS DIP STICK AUTO W/O MICRO   Result Value Ref Range    Color (UA POC)      Clarity (UA POC)      Glucose, Urine, POC Negative     Bilirubin, Urine, POC Negative     KETONES, Urine, POC Negative     Specific Gravity, Urine, POC 1.020 1.001 - 1.035    Blood (UA POC) Negative     pH, Urine, POC 6.5 4.6

## 2024-04-12 NOTE — PROGRESS NOTES
[facility]  [address] [address]  [phone]          Wilfred Guillen  : 1945    Chief Complaint   Patient presents with    Follow-up     1 mo          HPI     Wilfred Guillen is a 78 y.o. male w hx of AF on Eliquis/plavix and DM2 referred by Dr. Lexi Nieto for recurrent UTI. Upon chart review, pt had E.Coli UTI in Sept (in ER) and Oct 23. Sx include UF, fever, chills, and AMS. Denies pneumaturia. TIMOTHY in Aug 22 showed normal urinary tract. Cr 1.50.      Has baseline LUTS. Heavy caffeine intake. Flomax started sev months ago. This seems to help stream. PVR 80 cc via u/s.      Int constipation relieved w diet and colace.      Daily TMP started in  and has not had a UTI since starting. Cysto in Dec 23 by Dr. Cabezas showed severely obstructing lateral lobe of prostate; candidate for urolift.      To ER in  24 w c/o weakness and fever. Dx w UTI. Vantin started. Ucx contaminated.      Back today for fu. No further UTI. Cont to have sig LUTS including stop/start stream and incomplete emptying. Cont w daily TMP.     IPSS/QOL:       Past Medical History:   Diagnosis Date    Acute respiratory failure with hypoxia ([facility]) 10/23/2014    MINI (acute kidney injury) ([facility]) 09/15/2014    MINI (acute kidney injury) ([facility])     MINI (acute kidney injury) ([facility])     Allergic rhinitis 11/10/2015    Asthma 11/10/2015    Benign essential hypertension 11/10/2015    Benign neoplasm of colon 11/10/2015    CAD (coronary artery disease) 11/10/2015    CAD (coronary artery disease) ,     mix2, 3 stents [id number]    CAP (community acquired pneumonia) 2020    Cardiomyopathy ([facility]) 11/10/2015    Complicated wound infection 11/10/2015    COPD (chronic obstructive pulmonary disease) with emphysema ([facility]) 11/10/2015    COPD exacerbation ([facility]) 10/12/2011    COVID-19 2020    Diabetes mellitus type 2, controlled ([facility]) 11/10/2015    doesn/t check daily oral meds,

## 2024-04-15 ENCOUNTER — OFFICE VISIT (OUTPATIENT)
Dept: UROLOGY | Age: 79
End: 2024-04-15
Payer: MEDICARE

## 2024-04-15 DIAGNOSIS — N40.1 BPH WITH OBSTRUCTION/LOWER URINARY TRACT SYMPTOMS: Primary | ICD-10-CM

## 2024-04-15 DIAGNOSIS — N13.8 BPH WITH OBSTRUCTION/LOWER URINARY TRACT SYMPTOMS: Primary | ICD-10-CM

## 2024-04-15 DIAGNOSIS — N39.0 RECURRENT UTI: ICD-10-CM

## 2024-04-15 LAB
BILIRUBIN, URINE, POC: NEGATIVE
BLOOD URINE, POC: NEGATIVE
GLUCOSE URINE, POC: NEGATIVE
KETONES, URINE, POC: NEGATIVE
LEUKOCYTE ESTERASE, URINE, POC: NORMAL
NITRITE, URINE, POC: NEGATIVE
PH, URINE, POC: 6.5 (ref 4.6–8)
PROTEIN,URINE, POC: NEGATIVE
PVR, POC: 127 CC
SPECIFIC GRAVITY, URINE, POC: 1.02 (ref 1–1.03)
URINALYSIS CLARITY, POC: NORMAL
URINALYSIS COLOR, POC: NORMAL
UROBILINOGEN, POC: NORMAL

## 2024-04-15 PROCEDURE — 99214 OFFICE O/P EST MOD 30 MIN: CPT | Performed by: UROLOGY

## 2024-04-15 PROCEDURE — 4004F PT TOBACCO SCREEN RCVD TLK: CPT | Performed by: UROLOGY

## 2024-04-15 PROCEDURE — G8417 CALC BMI ABV UP PARAM F/U: HCPCS | Performed by: UROLOGY

## 2024-04-15 PROCEDURE — 81003 URINALYSIS AUTO W/O SCOPE: CPT | Performed by: UROLOGY

## 2024-04-15 PROCEDURE — 1123F ACP DISCUSS/DSCN MKR DOCD: CPT | Performed by: UROLOGY

## 2024-04-15 PROCEDURE — 51798 US URINE CAPACITY MEASURE: CPT | Performed by: UROLOGY

## 2024-04-15 PROCEDURE — G8427 DOCREV CUR MEDS BY ELIG CLIN: HCPCS | Performed by: UROLOGY

## 2024-04-16 ENCOUNTER — TELEPHONE (OUTPATIENT)
Dept: UROLOGY | Age: 79
End: 2024-04-16

## 2024-04-16 PROBLEM — N40.1 ENLARGED PROSTATE WITH LOWER URINARY TRACT SYMPTOMS (LUTS): Status: ACTIVE | Noted: 2024-04-16

## 2024-04-16 NOTE — TELEPHONE ENCOUNTER
----- Message from Harrison Cabezas MD sent at 4/15/2024  4:22 PM EDT -----  Regarding: schedule  Hospital: [facility]     Surgeon: patti  Assist: NONE    Diagnosis: bph, recurrent uti    Procedure: urolift    Posting time:  1 hour    Special Instruments Needed:  NONE    Anesthesia: GENERAL    Labs: CBC, BMP    Tests: EKG per anesthesia    Blood: NONE    Bowel Prep: NONE    Special Instructions: needs to hold BOTH eliquis and plavix.  Get clearance from Dr. Alban Medley.     Orders/HandP: done/done    Follow up appointment: Schedule post op day 1 nurse visit for hoang removal (early that morning) just in case I have to leave hoang day of urolift.  See me 1 mo post op.

## 2024-04-16 NOTE — TELEPHONE ENCOUNTER
Cardiac Pre-operative Assessment      Physician or Practice Requesting Medication Clearance or Risk Assessment:   Dr Cabezas     : [place]     Contact Phone Number: [phone]    Fax Number: [phone]    Date of Surgery/Procedure: 5/8    Type of Surgery or Procedure: urolift       Medications to hold plavix and eliquis     # Days pre-procedure to hold plavix 5 days , eliquis 3 days     Restart medication ____    Risk assessment:    Please send the response back in this phone encounter

## 2024-04-16 NOTE — TELEPHONE ENCOUNTER
Procedures: Procedure(s):   CYSTOSCOPY PROSTATIC URETHRAL LIFT   Date: 5/8/2024   Time: 0905   Location:  MAIN OR 01 CYSTO

## 2024-04-22 ENCOUNTER — TELEPHONE (OUTPATIENT)
Dept: INTERNAL MEDICINE CLINIC | Facility: CLINIC | Age: 79
End: 2024-04-22

## 2024-04-22 NOTE — TELEPHONE ENCOUNTER
Spoke with pt - he states his blood pressure has been running good.     148/71  120/68  127/63  119/58

## 2024-05-02 RX ORDER — ASPIRIN 81 MG/1
81 TABLET, CHEWABLE ORAL DAILY
COMMUNITY
End: 2024-08-08 | Stop reason: ALTCHOICE

## 2024-05-02 NOTE — PERIOP NOTE
Patient verified name and .  Order for consent found in EHR and matches case posting; patient verifies procedure.   Type 1B surgery, PAT phone assessment complete.  Orders received.  Labs per surgeon: none  Labs per anesthesia protocol: K+ 4.4 24. POC K+ s/h for DOS per anesthesia protocol. EKG 24    Patient had an ICD. States device has never discharged. ANTERIOS ICD added to case posting. Per ansthesia protocol no device rep needed on DOS d/t case is below the umbilicus.    Patient answered medical/surgical history questions at their best of ability. All prior to admission medications documented in EPIC.    Patient is unable to verify medication list. He states that his wife handles all his medication and gives permission for her to finish the assessment.  Patient's wife is instructed that patient is to continue taking all prescription medications up to the day of surgery but to take only the following medications the day of surgery according to anesthesia guidelines with a small sip of water: albuterol nebulizer, albuterol inhaler (use and bring), amiodarone, aspirin 81 mg, carvedilol, Flonase (if needed), Advair inhaler, rosuvastatin, tamsulosin, trimethoprim Also, patient is requested to take 2 Tylenol in the morning and then again before bed on the day before surgery. Regular or extra strength may be used.       Patient informed that all vitamins and supplements should be held 7 days prior to surgery and NSAIDS 5 days prior to surgery. Prescription meds to hold:vitamins and supplements. Patient to be advised on whether or not to hold anticoagulant by the surgeon. Patient's wife instructed if anticoagulant is held, a daily ASA 81 mg should be taken per anesthesia protocol.      Patient's wife instructed on the following:    > Arrive at Main Entrance, time of arrival to be called the day before by 1700  > NPO after midnight, unless otherwise indicated, including gum, mints, and ice chips  >

## 2024-05-07 ENCOUNTER — ANESTHESIA EVENT (OUTPATIENT)
Dept: SURGERY | Age: 79
End: 2024-05-07
Payer: MEDICARE

## 2024-05-08 ENCOUNTER — ANESTHESIA (OUTPATIENT)
Dept: SURGERY | Age: 79
End: 2024-05-08
Payer: MEDICARE

## 2024-05-08 ENCOUNTER — HOSPITAL ENCOUNTER (OUTPATIENT)
Age: 79
Setting detail: OUTPATIENT SURGERY
Discharge: HOME | End: 2024-05-08
Attending: UROLOGY | Admitting: UROLOGY
Payer: MEDICARE

## 2024-05-08 VITALS
WEIGHT: 280 LBS | SYSTOLIC BLOOD PRESSURE: 133 MMHG | HEART RATE: 63 BPM | TEMPERATURE: 98 F | HEIGHT: 72 IN | OXYGEN SATURATION: 95 % | BODY MASS INDEX: 37.93 KG/M2 | DIASTOLIC BLOOD PRESSURE: 72 MMHG | RESPIRATION RATE: 18 BRPM

## 2024-05-08 PROBLEM — N13.8 BPH WITH OBSTRUCTION/LOWER URINARY TRACT SYMPTOMS: Status: ACTIVE | Noted: 2024-04-16

## 2024-05-08 LAB
GLUCOSE BLD STRIP.AUTO-MCNC: 137 MG/DL (ref 65–100)
GLUCOSE BLD STRIP.AUTO-MCNC: 145 MG/DL (ref 65–100)
POTASSIUM BLD-SCNC: 4.6 MMOL/L (ref 3.5–5.1)
SERVICE CMNT-IMP: ABNORMAL
SERVICE CMNT-IMP: ABNORMAL

## 2024-05-08 PROCEDURE — 3600000003 HC SURGERY LEVEL 3 BASE: Performed by: UROLOGY

## 2024-05-08 PROCEDURE — 2500000003 HC RX 250 WO HCPCS: Performed by: NURSE ANESTHETIST, CERTIFIED REGISTERED

## 2024-05-08 PROCEDURE — 82962 GLUCOSE BLOOD TEST: CPT

## 2024-05-08 PROCEDURE — 7100000011 HC PHASE II RECOVERY - ADDTL 15 MIN: Performed by: UROLOGY

## 2024-05-08 PROCEDURE — C1889 IMPLANT/INSERT DEVICE, NOC: HCPCS | Performed by: UROLOGY

## 2024-05-08 PROCEDURE — 2580000003 HC RX 258: Performed by: ANESTHESIOLOGY

## 2024-05-08 PROCEDURE — 52441 CYSTO INSJ TRNSPRSTC 1 IMPLT: CPT | Performed by: UROLOGY

## 2024-05-08 PROCEDURE — 3700000001 HC ADD 15 MINUTES (ANESTHESIA): Performed by: UROLOGY

## 2024-05-08 PROCEDURE — 6370000000 HC RX 637 (ALT 250 FOR IP): Performed by: ANESTHESIOLOGY

## 2024-05-08 PROCEDURE — 6360000002 HC RX W HCPCS: Performed by: NURSE ANESTHETIST, CERTIFIED REGISTERED

## 2024-05-08 PROCEDURE — 7100000000 HC PACU RECOVERY - FIRST 15 MIN: Performed by: UROLOGY

## 2024-05-08 PROCEDURE — 2709999900 HC NON-CHARGEABLE SUPPLY: Performed by: UROLOGY

## 2024-05-08 PROCEDURE — 3700000000 HC ANESTHESIA ATTENDED CARE: Performed by: UROLOGY

## 2024-05-08 PROCEDURE — 6360000002 HC RX W HCPCS: Performed by: UROLOGY

## 2024-05-08 PROCEDURE — 7100000010 HC PHASE II RECOVERY - FIRST 15 MIN: Performed by: UROLOGY

## 2024-05-08 PROCEDURE — 52442 CYSTO INS TRNSPRSTC IMPLT EA: CPT | Performed by: UROLOGY

## 2024-05-08 PROCEDURE — 3600000013 HC SURGERY LEVEL 3 ADDTL 15MIN: Performed by: UROLOGY

## 2024-05-08 PROCEDURE — 84132 ASSAY OF SERUM POTASSIUM: CPT

## 2024-05-08 PROCEDURE — 7100000001 HC PACU RECOVERY - ADDTL 15 MIN: Performed by: UROLOGY

## 2024-05-08 DEVICE — UROLIFT 2 IMPLANT CARTRIDGE
Type: IMPLANTABLE DEVICE | Site: PROSTATE | Status: FUNCTIONAL
Brand: UROLIFT

## 2024-05-08 RX ORDER — ONDANSETRON 2 MG/ML
4 INJECTION INTRAMUSCULAR; INTRAVENOUS
Status: DISCONTINUED | OUTPATIENT
Start: 2024-05-08 | End: 2024-05-08 | Stop reason: HOSPADM

## 2024-05-08 RX ORDER — MIDAZOLAM HYDROCHLORIDE 2 MG/2ML
2 INJECTION, SOLUTION INTRAMUSCULAR; INTRAVENOUS
Status: DISCONTINUED | OUTPATIENT
Start: 2024-05-08 | End: 2024-05-08 | Stop reason: HOSPADM

## 2024-05-08 RX ORDER — NALOXONE HYDROCHLORIDE 0.4 MG/ML
INJECTION, SOLUTION INTRAMUSCULAR; INTRAVENOUS; SUBCUTANEOUS PRN
Status: DISCONTINUED | OUTPATIENT
Start: 2024-05-08 | End: 2024-05-08 | Stop reason: HOSPADM

## 2024-05-08 RX ORDER — FENTANYL CITRATE 50 UG/ML
100 INJECTION, SOLUTION INTRAMUSCULAR; INTRAVENOUS
Status: DISCONTINUED | OUTPATIENT
Start: 2024-05-08 | End: 2024-05-08 | Stop reason: HOSPADM

## 2024-05-08 RX ORDER — SODIUM CHLORIDE, SODIUM LACTATE, POTASSIUM CHLORIDE, CALCIUM CHLORIDE 600; 310; 30; 20 MG/100ML; MG/100ML; MG/100ML; MG/100ML
INJECTION, SOLUTION INTRAVENOUS CONTINUOUS
Status: DISCONTINUED | OUTPATIENT
Start: 2024-05-08 | End: 2024-05-08 | Stop reason: HOSPADM

## 2024-05-08 RX ORDER — SODIUM CHLORIDE 0.9 % (FLUSH) 0.9 %
5-40 SYRINGE (ML) INJECTION PRN
Status: DISCONTINUED | OUTPATIENT
Start: 2024-05-08 | End: 2024-05-08 | Stop reason: HOSPADM

## 2024-05-08 RX ORDER — ACETAMINOPHEN 500 MG
1000 TABLET ORAL ONCE
Status: COMPLETED | OUTPATIENT
Start: 2024-05-08 | End: 2024-05-08

## 2024-05-08 RX ORDER — SODIUM CHLORIDE 0.9 % (FLUSH) 0.9 %
5-40 SYRINGE (ML) INJECTION EVERY 12 HOURS SCHEDULED
Status: DISCONTINUED | OUTPATIENT
Start: 2024-05-08 | End: 2024-05-08 | Stop reason: HOSPADM

## 2024-05-08 RX ORDER — HYDROMORPHONE HYDROCHLORIDE 2 MG/ML
0.5 INJECTION, SOLUTION INTRAMUSCULAR; INTRAVENOUS; SUBCUTANEOUS EVERY 5 MIN PRN
Status: DISCONTINUED | OUTPATIENT
Start: 2024-05-08 | End: 2024-05-08 | Stop reason: HOSPADM

## 2024-05-08 RX ORDER — PROPOFOL 10 MG/ML
INJECTION, EMULSION INTRAVENOUS PRN
Status: DISCONTINUED | OUTPATIENT
Start: 2024-05-08 | End: 2024-05-08 | Stop reason: SDUPTHER

## 2024-05-08 RX ORDER — LIDOCAINE HYDROCHLORIDE 20 MG/ML
INJECTION, SOLUTION EPIDURAL; INFILTRATION; INTRACAUDAL; PERINEURAL PRN
Status: DISCONTINUED | OUTPATIENT
Start: 2024-05-08 | End: 2024-05-08 | Stop reason: SDUPTHER

## 2024-05-08 RX ORDER — SODIUM CHLORIDE 9 MG/ML
INJECTION, SOLUTION INTRAVENOUS PRN
Status: DISCONTINUED | OUTPATIENT
Start: 2024-05-08 | End: 2024-05-08 | Stop reason: HOSPADM

## 2024-05-08 RX ORDER — DIPHENHYDRAMINE HYDROCHLORIDE 50 MG/ML
12.5 INJECTION, SOLUTION INTRAMUSCULAR; INTRAVENOUS
Status: DISCONTINUED | OUTPATIENT
Start: 2024-05-08 | End: 2024-05-08 | Stop reason: HOSPADM

## 2024-05-08 RX ORDER — ONDANSETRON 2 MG/ML
INJECTION INTRAMUSCULAR; INTRAVENOUS PRN
Status: DISCONTINUED | OUTPATIENT
Start: 2024-05-08 | End: 2024-05-08 | Stop reason: SDUPTHER

## 2024-05-08 RX ORDER — CEFAZOLIN SODIUM 1 G/3ML
INJECTION, POWDER, FOR SOLUTION INTRAMUSCULAR; INTRAVENOUS PRN
Status: DISCONTINUED | OUTPATIENT
Start: 2024-05-08 | End: 2024-05-08 | Stop reason: SDUPTHER

## 2024-05-08 RX ADMIN — ONDANSETRON 4 MG: 2 INJECTION INTRAMUSCULAR; INTRAVENOUS at 09:27

## 2024-05-08 RX ADMIN — PROPOFOL 150 MG: 10 INJECTION, EMULSION INTRAVENOUS at 09:21

## 2024-05-08 RX ADMIN — SODIUM CHLORIDE, POTASSIUM CHLORIDE, SODIUM LACTATE AND CALCIUM CHLORIDE: 600; 310; 30; 20 INJECTION, SOLUTION INTRAVENOUS at 08:35

## 2024-05-08 RX ADMIN — Medication 2000 MG: at 09:24

## 2024-05-08 RX ADMIN — CEFAZOLIN 1 G: 1 INJECTION, POWDER, FOR SOLUTION INTRAMUSCULAR; INTRAVENOUS at 09:24

## 2024-05-08 RX ADMIN — ACETAMINOPHEN 1000 MG: 500 TABLET, FILM COATED ORAL at 08:38

## 2024-05-08 RX ADMIN — LIDOCAINE HYDROCHLORIDE 50 MG: 20 INJECTION, SOLUTION EPIDURAL; INFILTRATION; INTRACAUDAL; PERINEURAL at 09:21

## 2024-05-08 ASSESSMENT — COPD QUESTIONNAIRES: CAT_SEVERITY: MODERATE

## 2024-05-08 ASSESSMENT — LIFESTYLE VARIABLES: SMOKING_STATUS: 1

## 2024-05-08 ASSESSMENT — PAIN - FUNCTIONAL ASSESSMENT: PAIN_FUNCTIONAL_ASSESSMENT: 0-10

## 2024-05-08 NOTE — ANESTHESIA PRE PROCEDURE
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   • Irregular heart beat I49.9   • Acute metabolic encephalopathy G93.41   • Chest pain R07.9   • Hiatal hernia K44.9   • Diabetic neuropathy ([facility]) E11.40   • Normocytic anemia D64.9   • Chronic pain of right knee M25.561, G89.29   • Sleep apnea G47.30   • Osteoarthritis M19.90   • Encounter for long-term (current) drug use Z79.899   • Ischemic cardiomyopathy I25.5   • Ventricular tachycardia ([facility]) I47.20   • VT (ventricular tachycardia) ([facility]) I47.20   • Coronary artery disease involving native coronary artery of native heart without angina pectoris I25.10   • Benign neoplasm of colon D12.6   • PAD (peripheral artery disease) ([facility]) I73.9   • Asthma J45.909   • Orthostatic hypotension I95.1   • Hyperlipidemia E78.5   • S/P angioplasty with stent Z95.820   • DM (diabetes mellitus) type II, controlled, with peripheral vascular disorder ([facility]) E11.51   • Toe laceration S91.119A   • Obstructive sleep apnea G47.33   • MINI (acute kidney injury) ([facility]) N17.9   • Type 2 diabetes with nephropathy ([facility]) E11.21   • Hypoxia R09.02   • Abnormal nuclear cardiac imaging test R93.1   • PVC's (premature ventricular contractions) I49.3   • Contact with and (suspected) exposure to asbestos Z77.090   • Sepsis ([facility]) A41.9   • ICD (implantable cardioverter-defibrillator) in place Z95.810   • 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, vertebra(e), init ([facility]) M80.08XA   • General weakness R53.1   • Acute cystitis without hematuria N30.00   • Low back pain without sciatica M54.50   • Back pain M54.9   • DNI (do not intubate) Z78.9   • Elevated liver enzymes R74.8   • Anemia D64.9   •

## 2024-05-08 NOTE — DISCHARGE INSTRUCTIONS
catheter.  The syringe should securely fit into the balloon port.  5.  Gently pull the syringe plunger back to empty the balloon.  6.  Once the balloon has been deflated, you are ready to remove the catheter.  7.  Gently withdraw the catheter and place in the wastebasket.    If you don't understand the instructions for catheter removal call your doctor.  If you have difficulty removing the catheter, experience pain or develop bleeding, call your doctor.  If you are unable to contact your doctor, go to a hospital emergency room.        f0

## 2024-05-08 NOTE — ANESTHESIA POSTPROCEDURE EVALUATION
Department of Anesthesiology  Postprocedure Note    Patient: Wilfred Guillen  MRN: [id number]  YOB: 1945  Date of evaluation: 5/8/2024    Procedure Summary       Date: 05/08/24 Room / Location: [facility] MAIN OR 01 CYSTO / [facility] MAIN OR    Anesthesia Start: 0909 Anesthesia Stop: 0949    Procedure: CYSTOSCOPY PROSTATIC URETHRAL LIFT/ [employer] ICD Diagnosis:       Enlarged prostate with lower urinary tract symptoms (LUTS)      (Enlarged prostate with lower urinary tract symptoms (LUTS) [N40.1])    Providers: Harrison Cabezas MD Responsible Provider: Robert Reyna MD    Anesthesia Type: General ASA Status: 4            Anesthesia Type: General    Ana Phase I: Ana Score: 9    Ana Phase II:      Anesthesia Post Evaluation    Patient location during evaluation: PACU  Patient participation: complete - patient participated  Level of consciousness: awake and alert  Airway patency: patent  Nausea & Vomiting: no nausea and no vomiting  Cardiovascular status: hemodynamically stable  Respiratory status: acceptable, nonlabored ventilation and spontaneous ventilation  Hydration status: euvolemic  Comments: /65   Pulse 62   Temp 98 °F (36.7 °C) (Temporal)   Resp 16   Ht 1.829 m (6')   Wt 127 kg (280 lb)   SpO2 100%   BMI 37.97 kg/m²     Multimodal analgesia pain management approach  Pain management: adequate and satisfactory to patient    No notable events documented.

## 2024-05-08 NOTE — OP NOTE
Operative Note                Patient: Wilfred Guillen, [id number]    Date of Surgery: 05/08/24      PREOPERATIVE DIAGNOSES:  1.  Benign prostatic hyperplasia with lower urinary tract symptoms.    POSTOPERATIVE DIAGNOSES:  1.  Benign prostatic hyperplasia with lower urinary tract symptoms.     PROCEDURES PERFORMED:  Cystourethroscopy and urethral lift procedure (Urolift).     SURGEON:  Magalie Cabezas MD     ASSISTANT:  None.     ANESTHESIA:  General.     COMPLICATIONS:  None apparent.     SPECIMENS REMOVED:  No specimens.     IMPLANTS:  Urethral lift deployments x 4 (2 on RIGHT, 2 on LEFT)      ESTIMATED BLOOD LOSS:  Minimal.     INDICATIONS:  Gentleman with diagnosis as mentioned above, who after discussion of the risks versus benefits, decided to move forward with the above-named procedure.     FINDINGS:  2.5 cm prostatic urethra with lateral enlargement.  Within the bladder, he had minor trabeculations and no other pathology noted.      TECHNIQUE:  The patient was taken to the operating room, placed in the supine position.  Anesthesia was induced via Anesthesiology Service.  He was repositioned in the lithotomy position and prepped and draped in a sterile surgical fashion with the genitalia in a sterile field.  A 20-Armenian cystoscope with visual obturator and 0-degree lens was introduced atraumatically via the urethra with prostatic/bladder findings as mentioned above.  I then switched form visual obturator to urolift deployment device.  I then deployed the urethral lift devices. After all these had been placed, one could place the scope at the verumontanum and see an open channel through the prostatic urethra and into the bladder.     I did place a urethral catheter in the patient after the procedure as oozing of blood from the deployment sites was seen.        Signed By: MAGALIE CABEZAS MD              Implants:  Implant Name Type Inv. Item Serial No.  Lot No. LRB No. Used Action   DEVICE

## 2024-05-08 NOTE — INTERVAL H&P NOTE
Update History & Physical    The patient's History and Physical of April 15, 2024 was reviewed with the patient and I examined the patient. There was no change. The surgical site was confirmed by the patient and me.     Plan: The risks, benefits, expected outcome, and alternative to the recommended procedure have been discussed with the patient. Patient understands and wants to proceed with the procedure.     Electronically signed by MAGALIE RODRIGUEZ MD on 5/8/2024 at 7:12 AM

## 2024-05-09 ENCOUNTER — NURSE ONLY (OUTPATIENT)
Dept: UROLOGY | Age: 79
End: 2024-05-09

## 2024-05-09 NOTE — PROGRESS NOTES
Pt came in for a cath removal. Pt was placed in supine position and cath removed with no problems.Let pt know to drink plenty of water and if not able to urinate by 3 come to office for PVR and possible cath placement.

## 2024-05-24 ENCOUNTER — OFFICE VISIT (OUTPATIENT)
Dept: PULMONOLOGY | Age: 79
End: 2024-05-24
Payer: MEDICARE

## 2024-05-24 VITALS
BODY MASS INDEX: 38.47 KG/M2 | RESPIRATION RATE: 18 BRPM | DIASTOLIC BLOOD PRESSURE: 60 MMHG | HEIGHT: 72 IN | SYSTOLIC BLOOD PRESSURE: 110 MMHG | HEART RATE: 66 BPM | OXYGEN SATURATION: 96 % | WEIGHT: 284 LBS | TEMPERATURE: 98.4 F

## 2024-05-24 DIAGNOSIS — J44.9 COPD, SEVERE (HCC): Primary | ICD-10-CM

## 2024-05-24 DIAGNOSIS — F17.211 CIGARETTE NICOTINE DEPENDENCE IN REMISSION: ICD-10-CM

## 2024-05-24 DIAGNOSIS — I25.5 ISCHEMIC CARDIOMYOPATHY: ICD-10-CM

## 2024-05-24 DIAGNOSIS — G47.33 OBSTRUCTIVE SLEEP APNEA: ICD-10-CM

## 2024-05-24 DIAGNOSIS — J30.89 NON-SEASONAL ALLERGIC RHINITIS DUE TO OTHER ALLERGIC TRIGGER: ICD-10-CM

## 2024-05-24 DIAGNOSIS — Z77.090 CONTACT WITH AND (SUSPECTED) EXPOSURE TO ASBESTOS: ICD-10-CM

## 2024-05-24 LAB
EXPIRATORY TIME: NORMAL
FEF 25-75% %PRED-PRE: NORMAL
FEF 25-75% PRED: NORMAL
FEF 25-75-PRE: NORMAL
FEV1 %PRED-PRE: 55 %
FEV1 PRED: 3.16 L
FEV1/FVC %PRED-PRE: NORMAL
FEV1/FVC PRED: NORMAL
FEV1/FVC: 69 %
FEV1: 1.75 L
FVC %PRED-PRE: 59 %
FVC PRED: 4.31 L
FVC: 2.53 L
PEF %PRED-PRE: NORMAL
PEF PRED: NORMAL
PEF-PRE: NORMAL

## 2024-05-24 PROCEDURE — 3074F SYST BP LT 130 MM HG: CPT | Performed by: NURSE PRACTITIONER

## 2024-05-24 PROCEDURE — 3023F SPIROM DOC REV: CPT | Performed by: NURSE PRACTITIONER

## 2024-05-24 PROCEDURE — 3078F DIAST BP <80 MM HG: CPT | Performed by: NURSE PRACTITIONER

## 2024-05-24 PROCEDURE — G8427 DOCREV CUR MEDS BY ELIG CLIN: HCPCS | Performed by: NURSE PRACTITIONER

## 2024-05-24 PROCEDURE — G8417 CALC BMI ABV UP PARAM F/U: HCPCS | Performed by: NURSE PRACTITIONER

## 2024-05-24 PROCEDURE — 1123F ACP DISCUSS/DSCN MKR DOCD: CPT | Performed by: NURSE PRACTITIONER

## 2024-05-24 PROCEDURE — 99214 OFFICE O/P EST MOD 30 MIN: CPT | Performed by: NURSE PRACTITIONER

## 2024-05-24 PROCEDURE — 4004F PT TOBACCO SCREEN RCVD TLK: CPT | Performed by: NURSE PRACTITIONER

## 2024-05-24 RX ORDER — ALBUTEROL SULFATE 90 UG/1
AEROSOL, METERED RESPIRATORY (INHALATION)
Qty: 18 G | Refills: 11 | Status: SHIPPED | OUTPATIENT
Start: 2024-05-24

## 2024-05-24 RX ORDER — BRIMONIDINE TARTRATE 2 MG/ML
SOLUTION/ DROPS OPHTHALMIC
COMMUNITY
Start: 2024-04-22

## 2024-05-24 RX ORDER — MONTELUKAST SODIUM 10 MG/1
10 TABLET ORAL NIGHTLY
Qty: 90 TABLET | Refills: 3 | Status: SHIPPED | OUTPATIENT
Start: 2024-05-24

## 2024-05-24 RX ORDER — ALBUTEROL SULFATE 2.5 MG/3ML
SOLUTION RESPIRATORY (INHALATION)
Qty: 360 ML | Refills: 11 | Status: SHIPPED | OUTPATIENT
Start: 2024-05-24

## 2024-05-24 RX ORDER — FLUTICASONE PROPIONATE AND SALMETEROL 250; 50 UG/1; UG/1
POWDER RESPIRATORY (INHALATION)
Qty: 1 EACH | Refills: 11 | Status: SHIPPED | OUTPATIENT
Start: 2024-05-24

## 2024-05-24 ASSESSMENT — PULMONARY FUNCTION TESTS
FVC_PERCENT_PREDICTED_PRE: 59
FEV1/FVC: 69
FVC: 2.53
FVC_PREDICTED: 4.31
FEV1: 1.75
FEV1_PERCENT_PREDICTED_PRE: 55
FEV1_PREDICTED: 3.16

## 2024-05-24 ASSESSMENT — ENCOUNTER SYMPTOMS
HEMOPTYSIS: 0
COUGH: 1
WHEEZING: 1
SPUTUM PRODUCTION: 0
SHORTNESS OF BREATH: 0

## 2024-05-24 NOTE — PROGRESS NOTES
He is a 79-year-old male seen for follow-up.  To [facility], he was admitted 2/6/2023 with acute respiratory failure with hypoxia, hospital-acquired pneumonia of RLL. Note prior hospitalization 12/29/2022 with COPD exacerbation, influenza, bacteremia, acute hypoxic respiratory failure.  He was seen by ID.  Seen in hospital follow-up visit 2/2023, reported marked improvement symptomatically, recommendations for follow-up x-ray in 6 weeks.  CXR 4/10/2023 demonstrated clearing of pneumonia.     Unfortunately, since his visit, he was readmitted to [facility] 6/19/2023 with altered mental status, hypoxia, acute respiratory failure.  Initially was felt to be secondary to UTI but urine returned clear.  CXR 6/19/20230322-[id number]-qxvdcayksabr clear lungs.  CT of chest 6/19/2023 [facility]-demonstrated tree-in-bud opacities in RUL, possibly infectious or inflammatory. DIRK 6/23 demonstrated no evidence of vegetation.     Subsequent ER visit at [facility] with UTI with hematuria.          DIAGNOSTICS:        Spirometry 5/2013: FEV1 1.70 Final FEV1 % Pred 46 % Final FVC 2.55 Final FVC % Pred 52 % Final FEV1/F; Significant  interval decline in FVC and FEV (previously 67% and 58% of predicted, or 3.25 and 2.16 L respectively).  Chest CT 10/2014 - Negative for pulmonary embolus; basilar atelectasis. Stable left adrenal mass.  Spirometry:  10/12/2015 - Moderate restrictive defect, significant interval improvement in FVC  and FEV1.  Spirometry 4/11/0216 - moderate restrictive and obstructive defects, no significant interval change.  CXR 10/2016-linear density in both lung bases.  Could reflect some atelectasis and/or scarring  Spirometry 11/17/2016 - moderate restrictive defect.  Spirometry 2/14/2018-moderate obstructive and restrictive defects, no significant interval change.  CXR 2/8/2019-suboptimal exposure Technique.  Atelectasis or consolidation left lower lobe.   Spirometry 2/20/2019 interval decline in FVC, no significant change in FEV1  CXR 
Highest education level: None   Tobacco Use    Smoking status: Former     Current packs/day: 0.00     Average packs/day: 1 pack/day for 50.0 years (50.0 ttl pk-yrs)     Types: Cigarettes     Start date: 10/2/1961     Quit date: 10/2/2011     Years since quittin.6     Passive exposure: Never    Smokeless tobacco: Current     Types: Chew    Tobacco comments:     Chews tobacco daily   Vaping Use    Vaping Use: Never used   Substance and Sexual Activity    Alcohol use: Yes     Comment: seldom    Drug use: No   Social History Narrative    Lives with wife     Social Determinants of Health     Financial Resource Strain: Low Risk  (2024)    Overall Financial Resource Strain (CARDIA)     Difficulty of Paying Living Expenses: Not hard at all   Food Insecurity: No Food Insecurity (2024)    Hunger Vital Sign     Worried About Running Out of Food in the Last Year: Never true     Ran Out of Food in the Last Year: Never true   Transportation Needs: Unknown (2024)    PRAPARE - Transportation     Lack of Transportation (Non-Medical): No   Physical Activity: Inactive (10/4/2023)    Exercise Vital Sign     Days of Exercise per Week: 0 days     Minutes of Exercise per Session: 0 min   Housing Stability: Unknown (2024)    Housing Stability Vital Sign     Unstable Housing in the Last Year: No       Family History   Problem Relation Age of Onset    Breast Cancer Mother     Hypertension Mother     Other Father         DIVERTICULITIS    Crohn's Disease Sister     Lung Disease Brother         mesothioloma    Diabetes Sister     Heart Attack Father     Heart Disease Father     Stroke Mother        Allergies   Allergen Reactions    2,4-D Dimethylamine     Azithromycin Hives    Ciprofloxacin      Instructed by cardiologist to not take    Morphine Hallucinations     Muscle twitching. jerking      Oxaprozin Other (See Comments)     ELEVATED BLOOD PRESSURE        Oxycodone Anxiety       Current Outpatient Medications

## 2024-05-24 NOTE — PATIENT INSTRUCTIONS
Resume advair at least 1 inhalation every morning, increase to twice daily if there is increased shortness of breath, wheezing, cough or increased need for albuterol inhaler or nebulizer.    Albuterol inhaler or nebulizer 4 times daily as needed.    Continue montelukast 10 mg at bedtime.  Resume Flonase nasal spray, 1 to 2 sprays each nostril every evening to minimize nasal congestion/drainage.  Can also add OTC Zyrtec 1 daily as needed for control of nasal drainage.    Weight loss is encouraged.    Continue CPAP as prescribed.

## 2024-06-11 NOTE — PROGRESS NOTES
"             [facility] Medicine Services  Discharge Summary    Date of Service: 2024  Patient Name: Loyda Tirado  : 1939  MRN: [id number]    Date of Admission: 2024  Discharge Diagnosis: UTI  Date of Discharge:  2024  Primary Care Physician: Flori Palma DO      Presenting Problem:   General weakness [R53.1]  Acute UTI [N39.0]  Acute midline low back pain without sciatica [M54.50]    Active and Resolved Hospital Problems:  Active Hospital Problems    Diagnosis POA    Acute UTI [N39.0] Yes      Resolved Hospital Problems   No resolved problems to display.         Hospital Course     HPI:  Per the H&P written by   \"Patient is a 84 yo  female with PMH of CVA with left hemiparesis, dementia, HTN, anxiety, CKD, anemia, chronic heart failure, CLL who presented to ED on  for back pain. Saw PCP last week diagnosed with UTI, placed on cefdinir . Family reports increase in confusion with nausea and back pain since starting antibiotics. Family denies recent falls. UA revealed trace bacteria, leukocytes and WBC. Was placed in observation unit for treatment of UTI. Urine culture prelim reveals >470301 gram positive cocci. Urology consulted. CT revealed moderate bilateral hydronephrosis.      Upon evaluation, awake, alert, resting in bed. Current VS: 78-/84-92% room air. Oriented to person only. Follows commands, pleasant, cooperative. On exam with suprapubic tenderness. \"    Hospital Course:  Acute UTI   -UA showed trace of bacteria and moderate leukocytes. Urine culture grew VRE sensitive to nitrofurantoin  -Switch ceftriaxone to nitrofurantoin 100 mg twice daily to 10 days at discharge  -Family reports recurrent UTIs and recent treatment with antibiotic  -Urology consulted by ED provider--appreciate recommendations.  Would not place stents at this time as her renal function is fairly good and all evidence suggest that this is due to reflux and not true obstruction     -CT " PT Daily Note:  HOLD PT per RN due to respiratory status. Will check back on patient at a later date/time if schedule permits.   Thank you,  Angelica Brooks, PTA abd/pelvis which showed  Symmetric moderate bilateral hydroureteronephrosis  -I/Os.   -PRN bladder scan     Back pain--improving.   -presents with back pain. Family denies recent fall.   - xray spine showed no definite new fracture, severe osteopenia. Chronic compression fractures of T11, L1, L2, and L3 similar to prior abdominal CT on 4/7/2024 and Lumbar levoscoliosis.  -Pain meds as needed  -PT/OT consulted and recommended discharge home with family     Hypokalemia  -admission potassium  3, today 4.3  -admission Magnesium 1.5, improved to 3.8  -Received Kdur and Magnesium per protocol.   -daily BMP, Mg and replete as needed per pharmacy protocols.   -telemetry     Dementia   -Continue namenda  -pleasant, cooperative. Oriented to person only.     Depression/Anxiety  -Continue xanax, wellbutrin and prozac  -mood stable.      Hypertension  -trend BP  - Continue norvasc and hydralazine        DISCHARGE Follow Up Recommendations for labs and diagnostics:   Follow-up with primary care provider within 3 days of this discharge.  Follow-up with Dr. Fu to discuss overactive bladder medications and possible antibiotic prophylaxis      Reasons For Change In Medications and Indications for New Medications:  Cefdinir changed to nitrofurantoin based on culture sensitivities    Day of Discharge     Vital Signs:  Temp:  [97.7 °F (36.5 °C)-99 °F (37.2 °C)] 98.2 °F (36.8 °C)  Heart Rate:  [66-77] 70  Resp:  [12-21] 16  BP: (144-169)/(65-85) 149/65          Pertinent  and/or Most Recent Results     LAB RESULTS:      Lab 06/11/24  0512 06/10/24  0513 06/09/24 0245 06/08/24  1540   WBC 16.06* 15.18* 16.16* 14.50*   HEMOGLOBIN 9.4* 9.2* 9.0* 9.1*   HEMATOCRIT 30.9* 30.0* 29.8* 29.7*   PLATELETS 253 234 228 207   NEUTROS ABS 4.02 3.95 3.72 4.06   EOS ABS  --   --  0.16  --    MCV 90.6 89.0 90.6 91.4         Lab 06/11/24  0512 06/10/24  0619 06/09/24 0245 06/08/24 2001 06/08/24  1540   SODIUM 141 138 138 142 141   POTASSIUM 3.6 4.3  4.4 3.0* 4.5   CHLORIDE 108* 107 106 117* 108*   CO2 22.7 20.7* 21.9* 17.8* 22.0   ANION GAP 10.3 10.3 10.1 7.2 11.0   BUN 18 20 24* 21 28*   CREATININE 0.90 1.02* 0.99 0.76 1.12*   EGFR 62.4 53.7* 55.6* 76.4 48.0*   GLUCOSE 98 105* 117* 78 101*   CALCIUM 8.7 8.6 9.0 6.5* 8.5*   MAGNESIUM 2.2  --  3.8* 1.5*  --          Lab 06/08/24 2001   TOTAL PROTEIN 4.8*   ALBUMIN 2.8*   GLOBULIN 2.0   ALT (SGPT) 21   AST (SGOT) 18   BILIRUBIN <0.2   ALK PHOS 59                     Brief Urine Lab Results  (Last result in the past 365 days)        Color   Clarity   Blood   Leuk Est   Nitrite   Protein   CREAT   Urine HCG        06/08/24 1609 Yellow   Slightly Cloudy   Negative   Moderate (2+)   Negative   100 mg/dL (2+)                 Microbiology Results (last 10 days)       Procedure Component Value - Date/Time    Urine Culture - Urine, Straight Cath [[id number]]  (Abnormal)  (Susceptibility) Collected: 06/08/24 1609    Lab Status: Final result Specimen: Urine from Straight Cath Updated: 06/11/24 1138     Urine Culture >100,000 CFU/mL Enterococcus faecalis, VRE     Comment:   Vancomycin Resistant Enterococcus species. Patient may be an isolation risk.       Narrative:      Colonization of the urinary tract without infection is common. Treatment is discouraged unless the patient is symptomatic, pregnant, or undergoing an invasive urologic procedure.    Susceptibility        Enterococcus faecalis, VRE      SABRINA      Ampicillin Susceptible      Levofloxacin Resistant      Linezolid Susceptible      Nitrofurantoin Susceptible      Tetracycline Resistant      Vancomycin Resistant                                   FL Voiding Urethrocystogram    Result Date: 6/10/2024  Impression: Impression: Contrast not able to be injected into the urinary bladder. In correlation with recent prior CT, this is likely secondary to an obstructive process within the urinary bladder such that the urinary bladder is unable to be expanded with contrast.  Electronically Signed: Ethan Schmitz MD  6/10/2024 11:36 AM EDT  Workstation ID: [id number]    CT Abdomen Pelvis Stone Protocol    Result Date: 6/9/2024  Impression: Impression: 1. Symmetric moderate bilateral hydroureteronephrosis. Urinary bladder is collapsed with asymmetric wall thickening. These findings are similar to multiple prior comparison suggesting chronic partially obstructive process at the level of the urinary bladder. Benign and malignant etiologies are possible. 2. Mild fluid distended gallbladder without visualized stones, wall thickening or pericholecystic fluid. This could relate to NPO status. 3. Colonic diverticulosis. Moderate stool without obstruction. Correlate for constipation. 4. Symmetric mild bibasilar consolidation favoring atelectasis. 5. Multiple additional chronic/ancillary findings similar to previous comparison. Electronically Signed: Cedric Rosen MD  6/9/2024 11:05 AM EDT  Workstation ID: [id number]    XR Spine Lumbar Complete 4+VW    Result Date: 6/8/2024  Impression: Impression: 1. No definite new fracture although severe osteopenia limits assessment. If further concern for new fracture consider CT follow-up. 2. Chronic compression fractures of T11, L1, L2, and L3 similar to prior abdominal CT on 4/7/2024. 3. Lumbar levoscoliosis with degenerative findings above. Electronically Signed: Robert Munguia MD  6/8/2024 4:26 PM EDT  Workstation ID: [id number]     Results for orders placed during the hospital encounter of 09/10/22    Duplex Venous Upper Extremity - Left CAR    Interpretation Summary  · Normal left upper extremity venous duplex scan.      Results for orders placed during the hospital encounter of 09/10/22    Duplex Venous Upper Extremity - Left CAR    Interpretation Summary  · Normal left upper extremity venous duplex scan.      Results for orders placed during the hospital encounter of 06/16/23    Adult Transthoracic Echo Complete w/ Color, Spectral and Contrast if  Necessary Per Protocol    Interpretation Summary    Left ventricular ejection fraction appears to be 51 - 55%.    Left ventricular diastolic function is consistent with (grade I) impaired relaxation.    The left atrial cavity is dilated.    Mild aortic valve stenosis is present.    Poorly visible intracardiac structures.                 Consults:   Consults       Date and Time Order Name Status Description    6/10/2024  1:27 PM Inpatient Hospitalist Consult Completed     6/8/2024  7:01 PM IP Consult to Urology Completed               Discharge Details        Discharge Medications        New Medications        Instructions Start Date   nitrofurantoin (macrocrystal-monohydrate) 100 MG capsule  Commonly known as: Macrobid   100 mg, Oral, 2 Times Daily             Continue These Medications        Instructions Start Date   acetaminophen 500 MG tablet  Commonly known as: TYLENOL   500 mg, Oral, Every 6 Hours PRN      ALPRAZolam 0.5 MG tablet  Commonly known as: XANAX   0.5 mg, Oral, Nightly      amLODIPine 2.5 MG tablet  Commonly known as: NORVASC   2.5 mg, Oral, Daily      atorvastatin 40 MG tablet  Commonly known as: LIPITOR   40 mg, Oral, Daily      buPROPion 100 MG tablet  Commonly known as: WELLBUTRIN   100 mg, Oral, 2 Times Daily      calcium carbonate 500 MG chewable tablet  Commonly known as: TUMS   1 tablet, Oral, 4 Times Daily PRN      FLUoxetine 40 MG capsule  Commonly known as: PROzac   40 mg, Oral, Every Morning      hydrALAZINE 25 MG tablet  Commonly known as: APRESOLINE   25 mg, Oral, 3 Times Daily      HYDROcodone-acetaminophen  MG per tablet  Commonly known as: NORCO   1 tablet, Oral, Every 6 Hours      melatonin 5 MG tablet tablet   5 mg, Oral, Nightly      memantine 10 MG tablet  Commonly known as: NAMENDA   10 mg, Oral, 2 Times Daily      multivitamin with minerals tablet tablet   1 tablet, Oral, Daily      nebivolol 10 MG tablet  Commonly known as: Bystolic   10 mg, Oral, Daily      omeprazole  40 MG capsule  Commonly known as: priLOSEC   40 mg, Oral, Daily             Stop These Medications      cefdinir 300 MG capsule  Commonly known as: OMNICEF              Allergies   Allergen Reactions    Methadone Hcl Anaphylaxis         Discharge Disposition:   Home or Self Care    Diet:  Hospital:  Diet Order   Procedures    Diet: Regular/House; Texture: Pureed (NDD 1); Fluid Consistency: Nectar Thick         Discharge Activity:   As tolerated      CODE STATUS:  Code Status and Medical Interventions:   Ordered at: 06/08/24 1901     Level Of Support Discussed With:    Patient     Code Status (Patient has no pulse and is not breathing):    CPR (Attempt to Resuscitate)     Medical Interventions (Patient has pulse or is breathing):    Full Support         Future Appointments   Date Time Provider Department Center   7/25/2024 10:00 AM LAB MD [facility] LAB  [facility]   7/25/2024 10:15 AM Carlos Rodriguez MD [facility]   8/2/2024  1:30 PM Flori Palma DO [facility]           Time spent on Discharge including face to face service:  >30 minutes    Signature: Electronically signed by Amita Becerra MD, 06/11/24, 15:14 EDT.  [facility] Hospitalist Team

## 2024-06-12 ENCOUNTER — OFFICE VISIT (OUTPATIENT)
Dept: UROLOGY | Age: 79
End: 2024-06-12
Payer: MEDICARE

## 2024-06-12 DIAGNOSIS — N40.1 BPH WITH OBSTRUCTION/LOWER URINARY TRACT SYMPTOMS: Primary | ICD-10-CM

## 2024-06-12 DIAGNOSIS — N13.8 BPH WITH OBSTRUCTION/LOWER URINARY TRACT SYMPTOMS: Primary | ICD-10-CM

## 2024-06-12 LAB
BILIRUBIN, URINE, POC: NEGATIVE
BLOOD URINE, POC: NEGATIVE
GLUCOSE URINE, POC: NEGATIVE
KETONES, URINE, POC: NEGATIVE
LEUKOCYTE ESTERASE, URINE, POC: NEGATIVE
NITRITE, URINE, POC: NEGATIVE
PH, URINE, POC: 5.5 (ref 4.6–8)
PROTEIN,URINE, POC: NEGATIVE
SPECIFIC GRAVITY, URINE, POC: 1.02 (ref 1–1.03)
URINALYSIS CLARITY, POC: NORMAL
URINALYSIS COLOR, POC: NORMAL
UROBILINOGEN, POC: NORMAL

## 2024-06-12 PROCEDURE — G8417 CALC BMI ABV UP PARAM F/U: HCPCS | Performed by: UROLOGY

## 2024-06-12 PROCEDURE — 4004F PT TOBACCO SCREEN RCVD TLK: CPT | Performed by: UROLOGY

## 2024-06-12 PROCEDURE — 81003 URINALYSIS AUTO W/O SCOPE: CPT | Performed by: UROLOGY

## 2024-06-12 PROCEDURE — 99213 OFFICE O/P EST LOW 20 MIN: CPT | Performed by: UROLOGY

## 2024-06-12 PROCEDURE — 1123F ACP DISCUSS/DSCN MKR DOCD: CPT | Performed by: UROLOGY

## 2024-06-12 PROCEDURE — G8427 DOCREV CUR MEDS BY ELIG CLIN: HCPCS | Performed by: UROLOGY

## 2024-06-12 RX ORDER — CLOPIDOGREL BISULFATE 75 MG/1
TABLET ORAL
Qty: 90 TABLET | Refills: 3 | Status: SHIPPED | OUTPATIENT
Start: 2024-06-12

## 2024-06-12 ASSESSMENT — ENCOUNTER SYMPTOMS: BACK PAIN: 0

## 2024-06-12 NOTE — PROGRESS NOTES
Socioeconomic History    Marital status:      Spouse name: Not on file    Number of children: Not on file    Years of education: Not on file    Highest education level: Not on file   Occupational History    Not on file   Tobacco Use    Smoking status: Former     Current packs/day: 0.00     Average packs/day: 1 pack/day for 50.0 years (50.0 ttl pk-yrs)     Types: Cigarettes     Start date: 10/2/1961     Quit date: 10/2/2011     Years since quittin.7     Passive exposure: Never    Smokeless tobacco: Current     Types: Chew    Tobacco comments:     Chews tobacco daily   Vaping Use    Vaping Use: Never used   Substance and Sexual Activity    Alcohol use: Yes     Comment: seldom    Drug use: No    Sexual activity: Not on file   Other Topics Concern    Not on file   Social History Narrative    Lives with wife     Social Determinants of Health     Financial Resource Strain: Low Risk  (2024)    Overall Financial Resource Strain (CARDIA)     Difficulty of Paying Living Expenses: Not hard at all   Food Insecurity: No Food Insecurity (2024)    Hunger Vital Sign     Worried About Running Out of Food in the Last Year: Never true     Ran Out of Food in the Last Year: Never true   Transportation Needs: Unknown (2024)    PRAPARE - Transportation     Lack of Transportation (Medical): Not on file     Lack of Transportation (Non-Medical): No   Physical Activity: Inactive (10/4/2023)    Exercise Vital Sign     Days of Exercise per Week: 0 days     Minutes of Exercise per Session: 0 min   Stress: Not on file   Social Connections: Not on file   Intimate Partner Violence: Not on file   Housing Stability: Unknown (2024)    Housing Stability Vital Sign     Unable to Pay for Housing in the Last Year: Not on file     Number of Places Lived in the Last Year: Not on file     Unstable Housing in the Last Year: No     Family History   Problem Relation Age of Onset    Breast Cancer Mother     Hypertension Mother

## 2024-06-12 NOTE — TELEPHONE ENCOUNTER
Requested Prescriptions     Pending Prescriptions Disp Refills    clopidogrel (PLAVIX) 75 MG tablet [Pharmacy Med Name: CLOPIDOGREL 75MG] 90 tablet 3     Sig: TAKE 1 TABLET BY MOUTH EVERY DAY     Verified rx in last OV date 1/15/24. Pharmacy confirmed. Erx as requested.

## 2024-06-13 ENCOUNTER — CARE COORDINATION (OUTPATIENT)
Dept: CARE COORDINATION | Facility: CLINIC | Age: 79
End: 2024-06-13

## 2024-06-14 NOTE — CARE COORDINATION
Ambulatory Care Coordination Note     6/14/2024 9:59 AM     Patient Current Location:  Home: [address] [facility] [address]     Patient graduated from the High Risk Care Management program on 6/14/2024.  Spouse/Partner verbalizes confidence in the ability to self-manage at this time. has the ability to self manage at this time. progressing towards self management. .  Care management goals have been completed. No further Ambulatory Care Manager follow up scheduled.      ACM: Saba Sullivan RN, BSN, [facility] [phone]     Challenges to be reviewed by the provider   Additional needs identified to be addressed with provider No  none               Method of communication with provider: none.    Care Summary Note: outreached to patient for final CCM call   Patient had BPH surgery on 5/8/2024 and is doing very good  Patient has all f/u apts scheduled  Patient and spouse are planning a trip to go the mountains  No further outreaches scheduled at this time     Care Planning: goals completed    PCP/Specialist follow up:   Future Appointments         Provider Specialty Dept Phone    7/11/2024 1:45 PM Alban Medley MD Cardiology [phone]    8/8/2024 11:15 AM Lexi Nieto MD Internal Medicine [phone]    11/25/2024 1:40 PM Merary Helms, APRN - CNP Pulmonology [phone]    12/12/2024 2:00 PM Keyona Avila APRN - CNP Urology [phone]            Follow Up:   No further Ambulatory Care Management follow-up scheduled at this time.  patient and spouse has Ambulatory Care Manager's contact information for any further questions, concerns or needs.

## 2024-07-09 DIAGNOSIS — D50.8 OTHER IRON DEFICIENCY ANEMIA: ICD-10-CM

## 2024-07-09 RX ORDER — FERROUS SULFATE 325(65) MG
1 TABLET ORAL
Qty: 90 TABLET | Refills: 1 | Status: SHIPPED | OUTPATIENT
Start: 2024-07-09

## 2024-07-10 ENCOUNTER — TELEPHONE (OUTPATIENT)
Dept: PULMONOLOGY | Age: 79
End: 2024-07-10

## 2024-07-10 NOTE — TELEPHONE ENCOUNTER
TRIAGE CALL    Complaint: Coughing/.Wheezing  Cough: yes  Productive:  yes  Bloody Sputum:  no  Increased SOB/Wheezing:  yes  Duration: 1 months  Fever/Chills: no  OTC Meds tried: Tylenol

## 2024-07-11 ENCOUNTER — OFFICE VISIT (OUTPATIENT)
Age: 79
End: 2024-07-11
Payer: MEDICARE

## 2024-07-11 VITALS
SYSTOLIC BLOOD PRESSURE: 110 MMHG | DIASTOLIC BLOOD PRESSURE: 60 MMHG | HEART RATE: 64 BPM | HEIGHT: 73 IN | BODY MASS INDEX: 38.43 KG/M2 | WEIGHT: 290 LBS

## 2024-07-11 DIAGNOSIS — Z95.810 ICD (IMPLANTABLE CARDIOVERTER-DEFIBRILLATOR) IN PLACE: ICD-10-CM

## 2024-07-11 DIAGNOSIS — I47.20 VT (VENTRICULAR TACHYCARDIA) (HCC): Primary | ICD-10-CM

## 2024-07-11 DIAGNOSIS — I73.9 PAD (PERIPHERAL ARTERY DISEASE) (HCC): ICD-10-CM

## 2024-07-11 DIAGNOSIS — N18.31 STAGE 3A CHRONIC KIDNEY DISEASE (HCC): ICD-10-CM

## 2024-07-11 DIAGNOSIS — I25.10 CORONARY ARTERY DISEASE INVOLVING NATIVE CORONARY ARTERY OF NATIVE HEART WITHOUT ANGINA PECTORIS: ICD-10-CM

## 2024-07-11 DIAGNOSIS — I10 PRIMARY HYPERTENSION: ICD-10-CM

## 2024-07-11 PROCEDURE — G8427 DOCREV CUR MEDS BY ELIG CLIN: HCPCS | Performed by: INTERNAL MEDICINE

## 2024-07-11 PROCEDURE — 1123F ACP DISCUSS/DSCN MKR DOCD: CPT | Performed by: INTERNAL MEDICINE

## 2024-07-11 PROCEDURE — 3074F SYST BP LT 130 MM HG: CPT | Performed by: INTERNAL MEDICINE

## 2024-07-11 PROCEDURE — 3078F DIAST BP <80 MM HG: CPT | Performed by: INTERNAL MEDICINE

## 2024-07-11 PROCEDURE — 99214 OFFICE O/P EST MOD 30 MIN: CPT | Performed by: INTERNAL MEDICINE

## 2024-07-11 PROCEDURE — 4004F PT TOBACCO SCREEN RCVD TLK: CPT | Performed by: INTERNAL MEDICINE

## 2024-07-11 PROCEDURE — G8417 CALC BMI ABV UP PARAM F/U: HCPCS | Performed by: INTERNAL MEDICINE

## 2024-07-11 RX ORDER — CLOPIDOGREL BISULFATE 75 MG/1
75 TABLET ORAL DAILY
Qty: 90 TABLET | Refills: 3 | Status: SHIPPED | OUTPATIENT
Start: 2024-07-11

## 2024-07-11 ASSESSMENT — ENCOUNTER SYMPTOMS
ABDOMINAL PAIN: 0
WHEEZING: 0
SPUTUM PRODUCTION: 0
BOWEL INCONTINENCE: 0
BLURRED VISION: 0
SHORTNESS OF BREATH: 0
COLOR CHANGE: 0
ORTHOPNEA: 0
DIARRHEA: 0
HEMATOCHEZIA: 0
HOARSE VOICE: 0
CHEST TIGHTNESS: 0
HEMATEMESIS: 0

## 2024-07-11 NOTE — PROGRESS NOTES
7/11/2024), Disp: 90 tablet, Rfl: 3    aspirin 81 MG chewable tablet, Take 1 tablet by mouth daily (Patient not taking: Reported on 7/11/2024), Disp: , Rfl:     trimethoprim (TRIMPEX) 100 MG tablet, Take 1 tablet by mouth daily (Patient not taking: Reported on 7/11/2024), Disp: 90 tablet, Rfl: 1    GUAIFENESIN 1200 PO, Take 1 tablet by mouth every 12 hours as needed (Patient not taking: Reported on 7/11/2024), Disp: , Rfl:   Allergies   Allergen Reactions    2,4-D Dimethylamine     Azithromycin Hives    Ciprofloxacin      Instructed by cardiologist to not take    Morphine Hallucinations     Muscle twitching. jerking      Oxaprozin Other (See Comments)     ELEVATED BLOOD PRESSURE        Oxycodone Anxiety     Past Medical History:   Diagnosis Date    Acute respiratory failure with hypoxia ([facility]) 10/23/2014    MINI (acute kidney injury) ([facility]) 09/15/2014    Allergic rhinitis 11/10/2015    Asthma 11/10/2015    Benign essential hypertension 11/10/2015    Benign neoplasm of colon 11/10/2015    CAD (coronary artery disease) 11/10/2015    CAD (coronary artery disease) 1998, 1999    mix2, 3 stents [id number] stents total    CAP (community acquired pneumonia) 12/31/2020    Cardiomyopathy ([facility]) 11/10/2015    Complicated wound infection 11/10/2015    COPD (chronic obstructive pulmonary disease) with emphysema ([facility]) 11/10/2015    nebulizer and albuterol inhaler PRN, no home O2    COPD exacerbation ([facility]) 10/12/2011    COVID-19 12/31/2020    Diabetic neuropathy ([facility]) 11/10/2015    Disruption of wound, unspecified 10/09/2014    Encounter for long-term (current) use of other high-risk medications 11/10/2015    Extremity atherosclerosis with intermittent claudication ([facility]) 11/10/2015    H/O endarterectomy 11/10/2015    Hiatal hernia 11/10/2015    History of 2019 novel coronavirus disease (COVID-19)     12/31/2020- hospitalized    History of staph infection     Hyperglycemia due to type 1 diabetes mellitus ([facility]) 11/10/2015

## 2024-07-11 NOTE — TELEPHONE ENCOUNTER
Last seen: 5/24/24  Hx: severe COPD, allergic rhinitis, SHERI    Discussion at last visit that not using maintenance inhaler due to perceived benefit, instructed to start using advair once daily and then BID for increased symptoms or increased need for albuterol.  In addition to instructions for continuing montelukast 10 mg at bedtime, resume Flonase nasal spray, 1 to 2 sprays each nostril every evening to minimize nasal congestion/drainage. Also, can add OTC Zyrtec 1 daily as needed for control of nasal drainage.    Patient call reporting coughing & wheezing w/ sob for a month.    Contacted home # & spoke with spouse; sob is not most significantly, wheezing is most noticeable when laying down each night. Transferred call to patient, reports he has been using advair once daily; reports a runny nose & sinus problems for at least a month, maybe 2 months. Reports seldom use of nasal spray. Hasn't tried OTC allergy medications. Reviewed recommendations from last visit, offered work in or routing msg to provider. Patient reports he doesn't see a need for appt at this time. Commits to trying zyrtec or the like, reminded that flonase may be beneficial. If doesn't improve soon will call back.

## 2024-07-23 NOTE — PROGRESS NOTES
ACUTE PHYSICAL THERAPY GOALS:  (Developed with and agreed upon by patient and/or caregiver.)  STG:  (1.)Mr. Mariaelena Horn will move from supine to sit and sit to supine , scoot up and down and roll side to side with CONTACT GUARD ASSIST within 3 treatment day(s). (2.)Mr. Mariaelena Horn will transfer from bed to chair and chair to bed with CONTACT GUARD ASSIST using the least restrictive device within 3 treatment day(s). (3.)Mr. Mariaelena Horn will ambulate with CONTACT GUARD ASSIST for 40 feet with the least restrictive device within 3 treatment day(s). (4.)Mr. Mariaelena Horn will perform standing static and dynamic balance activities x 10 minutes with CONTACT GUARD ASSIST to improve safety within 3 treatment day(s). (5.)Mr. Mariaelena Horn will maintain stable vital signs throughout all functional mobility within 3 treatment days. LTG:  (1.)Mr. Mariaelena Horn will move from supine to sit and sit to supine , scoot up and down and roll side to side in bed with SUPERVISION within 7 treatment day(s). (2.)Mr. Mariaelena Horn will transfer from bed to chair and chair to bed with SUPERVISION using the least restrictive device within 7 treatment day(s). (3.)Mr. Mariaelena Horn will ambulate with SUPERVISION for 100+ feet with the least restrictive device within 7 treatment day(s). (4.)Mr. Mariaelena Horn will perform standing static and dynamic balance activities x 15 minutes with SUPERVISION to improve safety within 7 treatment day(s). (5.)Mr. Mariaelena Horn will ambulate and/or perform functional activities for 15 consecutive minutes with stable vital signs and no rests required to improve activity tolerance within 7 treatment days.   ________________________________________________________________________________________________    PHYSICAL THERAPY ASSESSMENT: Initial Assessment, Daily Note and PM PT Treatment Day # 1      [place] is a 67 y.o. male   PRIMARY DIAGNOSIS: Acute respiratory failure with hypoxia ([facility] [address]  CAP (community acquired pneumonia) [J18.9]       Reason for What Type Of Note Output Would You Prefer (Optional)?: Bullet Format How Severe Is Your Rash?: mild Referral:  Decreased activity tolerance, impaired balance, decreased strength secondary to COVID-19  ICD-10: Treatment Diagnosis: Generalized Muscle Weakness (M62.81)  Difficulty in walking, Not elsewhere classified (R26.2)  Repeated Falls (R29.6)  History of falling (Z91.81)  INPATIENT: [place] / Plan[place] PART [place] / Product Type: Medicare /     ASSESSMENT:     REHAB RECOMMENDATIONS:   Recommendation to date pending progress:  Setting:   Short-term Rehab  Equipment:    To Be Determined     PRIOR LEVEL OF FUNCTION:  (Prior to Hospitalization) INITIAL/CURRENT LEVEL OF FUNCTION:  (Most Recently Demonstrated)   Bed Mobility:   Independent  Sit to Stand:   Independent  Transfers:   Independent  Gait/Mobility:   Independent Bed Mobility:   Moderate Assistance  Sit to Stand:   Moderate Assistance  Transfers:   Moderate Assistance  Gait/Mobility:   Moderate Assistance     ASSESSMENT:  Mr. Alexander Edmondson presents with decreased activity tolerance, decreased strength and impaired balance which are impacting his ability to ambulate and perform transfers at his baseline. He is currently on AIRVO requiring 60 L/min 95% to perform transfers/ambulation within room and moderate assist for bed mobility, transfers and ambulation with use of a walker. Required multiple rest break and max cues for walker negotiation to ambulate 5' to recliner. Valencia Clark is currently functioning below his baseline and would benefit from skilled PT during acute care stay to maximize safety and independence with functional mobility. SUBJECTIVE:   Mr. Alexander Edmondson states, \"I just want out of this bed. \"    SOCIAL HISTORY/LIVING ENVIRONMENT: Patient lives in a 2 story home (basement) on the main level with his spouse. His son lives \"200 feet from me. \" He typically is independent with ambulation/ADLs and able to perform yard work at baseline, however does admit to balance challenges even at baseline and experience a fall prior Is This A New Presentation, Or A Follow-Up?: Rash to admission. Home Environment: Private residence  # Steps to Enter: 1  One/Two Story Residence: Two story, live on 1st floor  Living Alone: No  Support Systems: Spouse/Significant Other/Partner, Child(dileep)  OBJECTIVE:     PAIN: VITAL SIGNS: LINES/DRAINS:   Pre Treatment: Pain Screen  Pain Scale 1: Numeric (0 - 10)  Pain Intensity 1: 3  Pain Onset 1: now  Pain Location 1: Buttocks;Back(reports from being in bed)  Pain Intervention(s) 1: Ambulation/Increased Activity;Repositioned  Post Treatment: 0/10 Vital Signs  O2 Sat (%): 91 %(dropped to 88% with ambulation to chair)  O2 Device: Heated; Hi flow nasal cannula  O2 Flow Rate (L/min): 60 l/min  FIO2 (%): 95 % Continuous pulse oximeter  O2 Device: Heated, Hi flow nasal cannula, Non-rebreather mask     GROSS EVALUATION:  BLE Within Functional Limits Abnormal/ Functional Abnormal/ Non-Functional (see comments) Not Tested Comments:   AROM [x] [] [] []    PROM [x] [] [] []    Strength [] [x] [] []    Balance [] [x] [] []    Posture [] [x] [] []    Sensation [] [] [] [x]    Coordination [x] [] [] []    Tone [x] [] [] []    Edema [x] [] [] []    Activity Tolerance [] [] [x] [] desatted to 88% with ambulation 5'.    [] [] [] []      COGNITION/  PERCEPTION: Intact Impaired   (see comments) Comments:   Orientation [] []    Vision [] []    Hearing [] []    Command Following [] []    Safety Awareness [] []     [] []      MOBILITY: I Mod I S SBA CGA Min Mod Max Total  NT x2 Comments:   Bed Mobility    Rolling [] [] [] [] [] [] [] [] [] [] []    Supine to Sit [] [] [] [] [] [] [] [] [] [] []    Scooting [] [] [] [] [] [] [] [] [] [] []    Sit to Supine [] [] [] [] [] [] [] [] [] [] []    Transfers    Sit to Stand [] [] [] [] [] [] [] [] [] [] []    Bed to Chair [] [] [] [] [] [] [] [] [] [] []    Stand to Sit [] [] [] [] [] [] [] [] [] [] []    I=Independent, Mod I=Modified Independent, S=Supervision, SBA=Standby Assistance, CGA=Contact Guard Assistance,   Min=Minimal Assistance, Mod=Moderate Assistance, Max=Maximal Assistance, Total=Total Assistance, NT=Not Tested  GAIT: I Mod I S SBA CGA Min Mod Max Total  NT x2 Comments:   Level of Assistance [] [] [] [] [] [] [x] [] [] [] [] Max cues for safety/walker negotiation   Distance 5'    DME Rolling Walker and Gait Belt    Gait Quality Decreased step clearance, posterior lean, increased trunk sway    I=Independent, Mod I=Modified Independent, S=Supervision, SBA=Standby Assistance, CGA=Contact Guard Assistance,   Min=Minimal Assistance, Mod=Moderate Assistance, Max=Maximal Assistance, Total=Total Assistance, NT=Not Tested    [address] [place] [address] AM-PAC [facility] Form       How much difficulty does the patient currently have. .. Unable A Lot A Little None   1. Turning over in bed (including adjusting bedclothes, sheets and blankets)? [] 1   [x] 2   [] 3   [] 4   2. Sitting down on and standing up from a chair with arms ( e.g., wheelchair, bedside commode, etc.)   [] 1   [x] 2   [] 3   [] 4   3. Moving from lying on back to sitting on the side of the bed? [] 1   [x] 2   [] 3   [] 4   How much help from another person does the patient currently need. .. Total A Lot A Little None   4. Moving to and from a bed to a chair (including a wheelchair)? [] 1   [x] 2   [] 3   [] 4   5. Need to walk in hospital room? [] 1   [x] 2   [] 3   [] 4   6. Climbing 3-5 steps with a railing? [x] 1   [] 2   [] 3   [] 4   © 2007, Trustees of [address] [place] [address], under license to [employer]. All rights reserved     Score:  Initial: 11 Most Recent: X (Date: -- )    Interpretation of Tool:  Represents activities that are increasingly more difficult (i.e. Bed mobility, Transfers, Gait).     PLAN:   FREQUENCY/DURATION: PT Plan of Care: 3 times/week for duration of hospital stay or until stated goals are met, whichever comes first.    PROBLEM LIST:   (Skilled intervention is medically necessary to address:)  1. Decreased ADL/Functional Activities  2. Decreased Activity Tolerance  3. Decreased AROM/PROM  4. Decreased Balance  5. Decreased Cognition  6. Decreased Coordination  7. Decreased Gait Ability  8. Decreased Strength  9. Decreased Transfer Abilities  10. Increased Pain   INTERVENTIONS PLANNED:   (Benefits and precautions of physical therapy have been discussed with the patient.)  1. Therapeutic Activity  2. Therapeutic Exercise/HEP  3. Neuromuscular Re-education  4. Gait Training  5. Manual Therapy  6. Education     TREATMENT:     EVALUATION: Moderate Complexity : (Untimed Charge)    TREATMENT:   ($$ Therapeutic Activity: 38-52 mins    )  Therapeutic Activity (38 Minutes): Therapeutic activity included Supine to Sit, Scooting, Lateral Scooting, Transfer Training, Ambulation on level ground, Sitting balance  and Standing balance to improve functional Mobility, Strength and Activity tolerance.     AFTER TREATMENT POSITION/PRECAUTIONS:  Alarm Activated, Chair, Needs within reach and RN notified    INTERDISCIPLINARY COLLABORATION:  RN/PCT and PT/PTA    TOTAL TREATMENT DURATION:  PT Patient Time In/Time Out  Time In: 1334  Time Out: 3078 Jose Dominguez PT, DPT

## 2024-08-05 DIAGNOSIS — N39.0 RECURRENT UTI: ICD-10-CM

## 2024-08-05 RX ORDER — TRIMETHOPRIM 100 MG/1
100 TABLET ORAL DAILY
Qty: 90 TABLET | Refills: 0 | OUTPATIENT
Start: 2024-08-05

## 2024-08-07 LAB — DIABETIC RETINOPATHY: NEGATIVE

## 2024-08-08 ENCOUNTER — OFFICE VISIT (OUTPATIENT)
Dept: INTERNAL MEDICINE CLINIC | Facility: CLINIC | Age: 79
End: 2024-08-08

## 2024-08-08 VITALS
WEIGHT: 290 LBS | DIASTOLIC BLOOD PRESSURE: 54 MMHG | BODY MASS INDEX: 38.43 KG/M2 | RESPIRATION RATE: 20 BRPM | OXYGEN SATURATION: 95 % | HEART RATE: 63 BPM | HEIGHT: 73 IN | SYSTOLIC BLOOD PRESSURE: 125 MMHG

## 2024-08-08 DIAGNOSIS — E11.42 DIABETIC POLYNEUROPATHY ASSOCIATED WITH TYPE 2 DIABETES MELLITUS (HCC): ICD-10-CM

## 2024-08-08 DIAGNOSIS — J44.9 COPD, SEVERE (HCC): ICD-10-CM

## 2024-08-08 DIAGNOSIS — I47.20 VENTRICULAR TACHYCARDIA (HCC): ICD-10-CM

## 2024-08-08 DIAGNOSIS — R06.02 SOB (SHORTNESS OF BREATH): ICD-10-CM

## 2024-08-08 DIAGNOSIS — B35.1 ONYCHOMYCOSIS: ICD-10-CM

## 2024-08-08 DIAGNOSIS — R30.0 DYSURIA: Primary | ICD-10-CM

## 2024-08-08 LAB
ALBUMIN SERPL-MCNC: 4 G/DL (ref 3.2–4.6)
ALBUMIN/GLOB SERPL: 1.5 (ref 1–1.9)
ALP SERPL-CCNC: 78 U/L (ref 40–129)
ALT SERPL-CCNC: 20 U/L (ref 12–65)
ANION GAP SERPL CALC-SCNC: 12 MMOL/L (ref 9–18)
APPEARANCE UR: CLEAR
AST SERPL-CCNC: 17 U/L (ref 15–37)
BACTERIA URNS QL MICRO: 0 /HPF
BASOPHILS # BLD: 0.1 K/UL (ref 0–0.2)
BASOPHILS NFR BLD: 1 % (ref 0–2)
BILIRUB SERPL-MCNC: 0.5 MG/DL (ref 0–1.2)
BILIRUB UR QL: NEGATIVE
BUN SERPL-MCNC: 17 MG/DL (ref 8–23)
CALCIUM SERPL-MCNC: 9.3 MG/DL (ref 8.8–10.2)
CHLORIDE SERPL-SCNC: 103 MMOL/L (ref 98–107)
CHOLEST SERPL-MCNC: 112 MG/DL (ref 0–200)
CO2 SERPL-SCNC: 27 MMOL/L (ref 20–28)
COLOR UR: ABNORMAL
CREAT SERPL-MCNC: 1.42 MG/DL (ref 0.8–1.3)
DIFFERENTIAL METHOD BLD: ABNORMAL
EOSINOPHIL # BLD: 0.1 K/UL (ref 0–0.8)
EOSINOPHIL NFR BLD: 1 % (ref 0.5–7.8)
EPI CELLS #/AREA URNS HPF: NORMAL /HPF
ERYTHROCYTE [DISTWIDTH] IN BLOOD BY AUTOMATED COUNT: 16 % (ref 11.9–14.6)
EST. AVERAGE GLUCOSE BLD GHB EST-MCNC: 134 MG/DL
GLOBULIN SER CALC-MCNC: 2.7 G/DL (ref 2.3–3.5)
GLUCOSE SERPL-MCNC: 222 MG/DL (ref 70–99)
GLUCOSE UR STRIP.AUTO-MCNC: NEGATIVE MG/DL
HBA1C MFR BLD: 6.3 % (ref 0–5.6)
HCT VFR BLD AUTO: 37.3 % (ref 41.1–50.3)
HDLC SERPL-MCNC: 42 MG/DL (ref 40–60)
HDLC SERPL: 2.7 (ref 0–5)
HGB BLD-MCNC: 12 G/DL (ref 13.6–17.2)
HGB UR QL STRIP: NEGATIVE
IMM GRANULOCYTES # BLD AUTO: 0 K/UL (ref 0–0.5)
IMM GRANULOCYTES NFR BLD AUTO: 0 % (ref 0–5)
KETONES UR QL STRIP.AUTO: NEGATIVE MG/DL
LDLC SERPL CALC-MCNC: 37 MG/DL (ref 0–100)
LEUKOCYTE ESTERASE UR QL STRIP.AUTO: ABNORMAL
LYMPHOCYTES # BLD: 1.3 K/UL (ref 0.5–4.6)
LYMPHOCYTES NFR BLD: 19 % (ref 13–44)
MCH RBC QN AUTO: 32.9 PG (ref 26.1–32.9)
MCHC RBC AUTO-ENTMCNC: 32.2 G/DL (ref 31.4–35)
MCV RBC AUTO: 102.2 FL (ref 82–102)
MONOCYTES # BLD: 0.5 K/UL (ref 0.1–1.3)
MONOCYTES NFR BLD: 7 % (ref 4–12)
MUCOUS THREADS URNS QL MICRO: 0 /LPF
NEUTS SEG # BLD: 5.1 K/UL (ref 1.7–8.2)
NITRITE UR QL STRIP.AUTO: NEGATIVE
NRBC # BLD: 0 K/UL (ref 0–0.2)
PH UR STRIP: 5.5 (ref 5–9)
PLATELET # BLD AUTO: 150 K/UL (ref 150–450)
PMV BLD AUTO: 11.4 FL (ref 9.4–12.3)
POTASSIUM SERPL-SCNC: 4.2 MMOL/L (ref 3.5–5.1)
PROT SERPL-MCNC: 6.6 G/DL (ref 6.3–8.2)
PROT UR STRIP-MCNC: NEGATIVE MG/DL
RBC # BLD AUTO: 3.65 M/UL (ref 4.23–5.6)
RBC #/AREA URNS HPF: NORMAL /HPF
SODIUM SERPL-SCNC: 142 MMOL/L (ref 136–145)
SP GR UR REFRACTOMETRY: 1.01 (ref 1–1.02)
TRIGL SERPL-MCNC: 164 MG/DL (ref 0–150)
UROBILINOGEN UR QL STRIP.AUTO: 1 EU/DL (ref 0.2–1)
VLDLC SERPL CALC-MCNC: 33 MG/DL (ref 6–23)
WBC # BLD AUTO: 7.1 K/UL (ref 4.3–11.1)
WBC URNS QL MICRO: NORMAL /HPF

## 2024-08-08 ASSESSMENT — ENCOUNTER SYMPTOMS
WHEEZING: 0
BLOOD IN STOOL: 0
DIARRHEA: 0
SHORTNESS OF BREATH: 0
NAUSEA: 0
COUGH: 0
CONSTIPATION: 1
VOMITING: 0

## 2024-08-08 NOTE — PROGRESS NOTES
8/8/2024 1:45 PM  Location:[place] [facility] PHYSICIAN SERVICES  [facility] [place]ICINE  [place]  Patient #:  [id number]  YOB: 1945            History of Present Illness     Chief Complaint   Patient presents with    6 Month Follow-Up     6 month f/u    Diabetes     Due for a foot exam today.     Shortness of Breath     Complains with some shortness of breath.        Mr. Guillen is a 79 y.o. male  who presents for the above.   Notes that his oxygen at home with exertion is the same.  Feels like he no longer has the lung capacity.           Allergies   Allergen Reactions    2,4-D Dimethylamine     Azithromycin Hives    Ciprofloxacin      Instructed by cardiologist to not take    Morphine Hallucinations     Muscle twitching. jerking      Oxaprozin Other (See Comments)     ELEVATED BLOOD PRESSURE        Oxycodone Anxiety     Past Medical History:   Diagnosis Date    Acute respiratory failure with hypoxia ([facility]) 10/23/2014    MINI (acute kidney injury) ([facility]) 09/15/2014    Allergic rhinitis 11/10/2015    Asthma 11/10/2015    Benign essential hypertension 11/10/2015    Benign neoplasm of colon 11/10/2015    CAD (coronary artery disease) 11/10/2015    CAD (coronary artery disease) 1998, 1999    mix2, 3 stents [id number]----3 stents total    CAP (community acquired pneumonia) 12/31/2020    Cardiomyopathy ([facility]) 11/10/2015    Complicated wound infection 11/10/2015    COPD (chronic obstructive pulmonary disease) with emphysema ([facility]) 11/10/2015    nebulizer and albuterol inhaler PRN, no home O2    COPD exacerbation ([facility]) 10/12/2011    COVID-19 12/31/2020    Diabetic neuropathy ([facility]) 11/10/2015    Disruption of wound, unspecified 10/09/2014    Encounter for long-term (current) use of other high-risk medications 11/10/2015    Extremity atherosclerosis with intermittent claudication ([facility]) 11/10/2015    H/O endarterectomy 11/10/2015    Hiatal hernia 11/10/2015    History of 2019 novel coronavirus disease (COVID-19)

## 2024-08-10 LAB
BACTERIA SPEC CULT: ABNORMAL
BACTERIA SPEC CULT: ABNORMAL
SERVICE CMNT-IMP: ABNORMAL

## 2024-08-12 ENCOUNTER — TELEPHONE (OUTPATIENT)
Dept: INTERNAL MEDICINE CLINIC | Facility: CLINIC | Age: 79
End: 2024-08-12

## 2024-08-12 RX ORDER — AMOXICILLIN 500 MG/1
500 CAPSULE ORAL 3 TIMES DAILY
Qty: 21 CAPSULE | Refills: 0 | Status: SHIPPED | OUTPATIENT
Start: 2024-08-12 | End: 2024-08-19

## 2024-08-12 NOTE — TELEPHONE ENCOUNTER
You do have a UTI.  I'm sending in antibiotics.  Eat yogurt every day while you are on antibiotics.   Keep a low threshold for contacting the office with worsening symptoms.   Call if worse or no better in the next 2-3 days.  Other labs are stable.       Please schedule follow up:    Return in about 6 months (around 2/8/2025) for AWV and, 3 month NP

## 2024-08-13 NOTE — TELEPHONE ENCOUNTER
Pt has an antibiotic from his urologist. He wants to know if he can take that instead of getting he antibiotic that [facility] sent in. Please call [phone]

## 2024-08-28 ENCOUNTER — OFFICE VISIT (OUTPATIENT)
Dept: INTERNAL MEDICINE CLINIC | Facility: CLINIC | Age: 79
End: 2024-08-28
Payer: MEDICARE

## 2024-08-28 VITALS
DIASTOLIC BLOOD PRESSURE: 65 MMHG | OXYGEN SATURATION: 95 % | SYSTOLIC BLOOD PRESSURE: 139 MMHG | BODY MASS INDEX: 38.33 KG/M2 | HEIGHT: 73 IN | WEIGHT: 289.2 LBS | RESPIRATION RATE: 16 BRPM | TEMPERATURE: 98 F | HEART RATE: 57 BPM

## 2024-08-28 DIAGNOSIS — N39.0 RECURRENT UTI: Primary | ICD-10-CM

## 2024-08-28 DIAGNOSIS — N39.0 RECURRENT UTI: ICD-10-CM

## 2024-08-28 DIAGNOSIS — L72.3 SEBACEOUS CYST: ICD-10-CM

## 2024-08-28 LAB
APPEARANCE UR: CLEAR
BACTERIA URNS QL MICRO: 0 /HPF
BILIRUB UR QL: NEGATIVE
COLOR UR: NORMAL
GLUCOSE UR STRIP.AUTO-MCNC: NEGATIVE MG/DL
HGB UR QL STRIP: NEGATIVE
KETONES UR QL STRIP.AUTO: NEGATIVE MG/DL
LEUKOCYTE ESTERASE UR QL STRIP.AUTO: NEGATIVE
NITRITE UR QL STRIP.AUTO: NEGATIVE
PH UR STRIP: 6.5 (ref 5–9)
PROT UR STRIP-MCNC: NEGATIVE MG/DL
SP GR UR REFRACTOMETRY: 1.01 (ref 1–1.02)
UROBILINOGEN UR QL STRIP.AUTO: 1 EU/DL (ref 0.2–1)

## 2024-08-28 PROCEDURE — 4004F PT TOBACCO SCREEN RCVD TLK: CPT | Performed by: NURSE PRACTITIONER

## 2024-08-28 PROCEDURE — G8427 DOCREV CUR MEDS BY ELIG CLIN: HCPCS | Performed by: NURSE PRACTITIONER

## 2024-08-28 PROCEDURE — G8417 CALC BMI ABV UP PARAM F/U: HCPCS | Performed by: NURSE PRACTITIONER

## 2024-08-28 PROCEDURE — 1123F ACP DISCUSS/DSCN MKR DOCD: CPT | Performed by: NURSE PRACTITIONER

## 2024-08-28 PROCEDURE — 99213 OFFICE O/P EST LOW 20 MIN: CPT | Performed by: NURSE PRACTITIONER

## 2024-08-28 PROCEDURE — 3075F SYST BP GE 130 - 139MM HG: CPT | Performed by: NURSE PRACTITIONER

## 2024-08-28 PROCEDURE — 3078F DIAST BP <80 MM HG: CPT | Performed by: NURSE PRACTITIONER

## 2024-08-28 NOTE — PROGRESS NOTES
8/28/2024 2:18 PM  Location:[facility] PHYSICIAN SERVICES  [facility] [place] MEDICINE  [place]  Patient #:  [id number]  YOB: 1945          YOUR LAST HEMOGLOBIN A1CS:   No results found for: \"HBA1C\", \"GAB0LVIO\"    YOUR LAST LIPID PROFILE:   Lab Results   Component Value Date/Time    CHOL 112 08/08/2024 12:28 PM    HDL 42 08/08/2024 12:28 PM    LDL 37 08/08/2024 12:28 PM    LDL 28.6 04/08/2024 02:41 PM    VLDL 33 08/08/2024 12:28 PM    VLDL 18 09/21/2021 02:54 PM         Lab Results   Component Value Date/Time    GFRAA >60 09/16/2022 04:42 AM    BUN 17 08/08/2024 12:28 PM     08/08/2024 12:28 PM    K 4.2 08/08/2024 12:28 PM     08/08/2024 12:28 PM    CO2 27 08/08/2024 12:28 PM           History of Present Illness     Chief Complaint   Patient presents with    Frequent/Recurrent UTI     Patient presents in the office today for a 3 week follow up on UTI after finishing abx.  Patient would like to make sure the infection has resolved.  Patient states he is still experiencing some urgency and frequency.       Mr. Guillen is a 79 y.o. male  who presents for the above-mentioned complaints.  Mr. Guillen presents for follow-up after finishing amoxicillin for beta-hemolytic strep low-grade UTI diagnosed by Dr. Nieto on August 8.  Is followed by urology, taking Flomax. He reports continued frequency and urgency.  Per note from urology:  Upon chart review, pt had E.Coli UTI in Sept (in ER) and Oct 23. Sx include UF, fever, chills, and AMS. Denies pneumaturia. TIMOTHY in Aug 22 showed normal urinary tract. Cr 1.50.      Has baseline LUTS. Heavy caffeine intake. Flomax started sev months ago. This seems to help stream. PVR 80 cc via u/s.      Int constipation relieved w diet and colace.      Daily TMP started in Nov 23 and has not had a UTI since starting. Cysto in Dec 23 by Dr. Cabezas showed severely obstructing lateral lobe of prostate; candidate for urolift.      To ER in Jan 2 24 w c/o weakness and  MG tablet TAKE 1 TABLET BY MOUTH EVERY DAY WITH BREAKFAST 90 tablet 1    brimonidine (ALPHAGAN) 0.2 % ophthalmic solution       albuterol (PROVENTIL) (2.5 MG/3ML) 0.083% nebulizer solution 1 vial via nebulizer 4 times daily if needed for shortness of breath or wheezing.  Diagnosis-COPD, J44.9.  Bill to Medicare part B 360 mL 11    albuterol sulfate HFA (PROVENTIL;VENTOLIN;PROAIR) 108 (90 Base) MCG/ACT inhaler USE 2 PUFFS BY INHALATION 4 TIMES DAILY AS NEEDED FOR WHEEZING OR SHORTNESS OF BREATH. Patient prefers ventolin. 18 g 11    montelukast (SINGULAIR) 10 MG tablet Take 1 tablet by mouth nightly 90 tablet 3    fluticasone-salmeterol (ADVAIR) 250-50 MCG/ACT AEPB diskus inhaler 1 inhalation twice daily, rinse mouth after use 1 each 11    Omega-3 Fatty Acids (FISH OIL PO) Take by mouth      Multiple Vitamin (MULTIVITAMIN ADULT PO) Take by mouth      GARLIC PO Take by mouth daily      furosemide (LASIX) 20 MG tablet TAKE 1 TABLET BY MOUTH EVERY DAY 30 tablet 11    carvedilol (COREG) 6.25 MG tablet TAKE 1 TABLET BY MOUTH TWICE DAILY WITH MEALS 180 tablet 3    ELIQUIS 5 MG TABS tablet TAKE 1 TABLET BY MOUTH EVERY MORNING AND EVERY EVENING 180 tablet 3    rosuvastatin (CRESTOR) 10 MG tablet TAKE 1 TABLET BY MOUTH EVERY DAY 90 tablet 3    tamsulosin (FLOMAX) 0.4 MG capsule TAKE 1 CAPSULE BY MOUTH EVERY DAY 90 capsule 3    amiodarone (CORDARONE) 200 MG tablet TAKE 1 TABLET BY MOUTH EVERY DAY 90 tablet 3    lisinopril (PRINIVIL;ZESTRIL) 10 MG tablet TAKE 1 TABLET BY MOUTH EVERY DAY 90 tablet 3    glipiZIDE (GLUCOTROL XL) 10 MG extended release tablet Take 1 tablet by mouth daily 90 tablet 3    nystatin (MYCOSTATIN) 660047 UNIT/GM powder Apply 2 times daily as needed. 1 each 3    fluticasone (FLONASE) 50 MCG/ACT nasal spray USE 1 OR 2 SPRAYS IN EACH NOSTRIL EVERY DAY. (Patient taking differently: as needed USE 1 OR 2 SPRAYS IN EACH NOSTRIL EVERY DAY.) 3 each 3    magnesium oxide (MAG-OX) 400 (240 Mg) MG tablet TAKE 1 TABLET

## 2024-08-29 ENCOUNTER — TELEPHONE (OUTPATIENT)
Dept: INTERNAL MEDICINE CLINIC | Facility: CLINIC | Age: 79
End: 2024-08-29

## 2024-08-29 ENCOUNTER — NURSE ONLY (OUTPATIENT)
Age: 79
End: 2024-08-29

## 2024-08-29 DIAGNOSIS — Z95.810 ICD (IMPLANTABLE CARDIOVERTER-DEFIBRILLATOR) IN PLACE: Primary | ICD-10-CM

## 2024-08-29 DIAGNOSIS — I47.20 VT (VENTRICULAR TACHYCARDIA) (HCC): ICD-10-CM

## 2024-08-29 NOTE — TELEPHONE ENCOUNTER
Please let Mr. Guillen know that the urine looks clean right now. I will reach out once I get the culture.   Please let us know if you develop  any new or worsening sx.  Janay

## 2024-08-30 ENCOUNTER — TELEPHONE (OUTPATIENT)
Dept: INTERNAL MEDICINE CLINIC | Facility: CLINIC | Age: 79
End: 2024-08-30

## 2024-08-30 LAB
BACTERIA SPEC CULT: NORMAL
SERVICE CMNT-IMP: NORMAL

## 2024-08-30 NOTE — TELEPHONE ENCOUNTER
Mr. Guillen,  Your urine cultured showed no significant infection in your bladder.  Please let us know if your symptoms have not resolved.   Janay

## 2024-10-21 ENCOUNTER — TELEPHONE (OUTPATIENT)
Dept: INTERNAL MEDICINE CLINIC | Facility: CLINIC | Age: 79
End: 2024-10-21

## 2024-10-21 NOTE — TELEPHONE ENCOUNTER
Cara from [facility] called about fax for genetic testing patient is interested in they needed signed off fax back to fax - [phone] phone - [phone]

## 2024-11-05 ENCOUNTER — OFFICE VISIT (OUTPATIENT)
Dept: INTERNAL MEDICINE CLINIC | Facility: CLINIC | Age: 79
End: 2024-11-05

## 2024-11-05 VITALS
BODY MASS INDEX: 38.57 KG/M2 | WEIGHT: 291 LBS | RESPIRATION RATE: 18 BRPM | HEART RATE: 60 BPM | DIASTOLIC BLOOD PRESSURE: 67 MMHG | SYSTOLIC BLOOD PRESSURE: 141 MMHG | HEIGHT: 73 IN

## 2024-11-05 DIAGNOSIS — E11.21 TYPE 2 DIABETES WITH NEPHROPATHY (HCC): ICD-10-CM

## 2024-11-05 DIAGNOSIS — J44.9 CHRONIC OBSTRUCTIVE PULMONARY DISEASE, UNSPECIFIED COPD TYPE (HCC): ICD-10-CM

## 2024-11-05 DIAGNOSIS — E11.51 DM (DIABETES MELLITUS) TYPE II, CONTROLLED, WITH PERIPHERAL VASCULAR DISORDER (HCC): Primary | ICD-10-CM

## 2024-11-05 DIAGNOSIS — I10 PRIMARY HYPERTENSION: ICD-10-CM

## 2024-11-05 DIAGNOSIS — E11.51 DM (DIABETES MELLITUS) TYPE II, CONTROLLED, WITH PERIPHERAL VASCULAR DISORDER (HCC): ICD-10-CM

## 2024-11-05 DIAGNOSIS — I25.10 CORONARY ARTERY DISEASE INVOLVING NATIVE CORONARY ARTERY OF NATIVE HEART WITHOUT ANGINA PECTORIS: ICD-10-CM

## 2024-11-05 LAB
ANION GAP SERPL CALC-SCNC: 8 MMOL/L (ref 7–16)
BUN SERPL-MCNC: 16 MG/DL (ref 8–23)
CALCIUM SERPL-MCNC: 9.1 MG/DL (ref 8.8–10.2)
CHLORIDE SERPL-SCNC: 103 MMOL/L (ref 98–107)
CO2 SERPL-SCNC: 29 MMOL/L (ref 20–29)
CREAT SERPL-MCNC: 1.25 MG/DL (ref 0.8–1.3)
GLUCOSE SERPL-MCNC: 189 MG/DL (ref 70–99)
POTASSIUM SERPL-SCNC: 4.6 MMOL/L (ref 3.5–5.1)
SODIUM SERPL-SCNC: 139 MMOL/L (ref 136–145)

## 2024-11-05 RX ORDER — WHEAT DEXTRIN 3 G/3.8 G
4 POWDER (GRAM) ORAL
COMMUNITY
Start: 2024-11-05

## 2024-11-05 ASSESSMENT — PATIENT HEALTH QUESTIONNAIRE - PHQ9
7. TROUBLE CONCENTRATING ON THINGS, SUCH AS READING THE NEWSPAPER OR WATCHING TELEVISION: NOT AT ALL
SUM OF ALL RESPONSES TO PHQ9 QUESTIONS 1 & 2: 3
SUM OF ALL RESPONSES TO PHQ QUESTIONS 1-9: 5
SUM OF ALL RESPONSES TO PHQ QUESTIONS 1-9: 5
1. LITTLE INTEREST OR PLEASURE IN DOING THINGS: NEARLY EVERY DAY
SUM OF ALL RESPONSES TO PHQ QUESTIONS 1-9: 5
5. POOR APPETITE OR OVEREATING: NOT AT ALL
2. FEELING DOWN, DEPRESSED OR HOPELESS: NOT AT ALL
6. FEELING BAD ABOUT YOURSELF - OR THAT YOU ARE A FAILURE OR HAVE LET YOURSELF OR YOUR FAMILY DOWN: NOT AT ALL
SUM OF ALL RESPONSES TO PHQ QUESTIONS 1-9: 5
4. FEELING TIRED OR HAVING LITTLE ENERGY: SEVERAL DAYS
9. THOUGHTS THAT YOU WOULD BE BETTER OFF DEAD, OR OF HURTING YOURSELF: NOT AT ALL
3. TROUBLE FALLING OR STAYING ASLEEP: SEVERAL DAYS
8. MOVING OR SPEAKING SO SLOWLY THAT OTHER PEOPLE COULD HAVE NOTICED. OR THE OPPOSITE, BEING SO FIGETY OR RESTLESS THAT YOU HAVE BEEN MOVING AROUND A LOT MORE THAN USUAL: NOT AT ALL
10. IF YOU CHECKED OFF ANY PROBLEMS, HOW DIFFICULT HAVE THESE PROBLEMS MADE IT FOR YOU TO DO YOUR WORK, TAKE CARE OF THINGS AT HOME, OR GET ALONG WITH OTHER PEOPLE: SOMEWHAT DIFFICULT

## 2024-11-05 ASSESSMENT — ENCOUNTER SYMPTOMS
COUGH: 0
NAUSEA: 0
CONSTIPATION: 1
DIARRHEA: 1
SHORTNESS OF BREATH: 0
WHEEZING: 0
VOMITING: 0

## 2024-11-05 NOTE — PROGRESS NOTES
11/5/2024 9:00 AM  Location:[facility] PHYSICIAN SERVICES  [facility] INTERNAL MEDICINE  [place]  Patient #:  [id number]  YOB: 1945            History of Present Illness     Chief Complaint   Patient presents with    3 Month Follow-Up     3 month f/u       Mr. Guillen is a 79 y.o. male  who presents for the above.   Feeling well.  Is not exercising regularly. r           Allergies   Allergen Reactions    2,4-D Dimethylamine     Azithromycin Hives    Ciprofloxacin      Instructed by cardiologist to not take    Morphine Hallucinations     Muscle twitching. jerking      Oxaprozin Other (See Comments)     ELEVATED BLOOD PRESSURE        Oxycodone Anxiety     Past Medical History:   Diagnosis Date    Acute respiratory failure with hypoxia 10/23/2014    MINI (acute kidney injury) ([facility]) 09/15/2014    Allergic rhinitis 11/10/2015    Asthma 11/10/2015    Benign essential hypertension 11/10/2015    Benign neoplasm of colon 11/10/2015    CAD (coronary artery disease) 11/10/2015    CAD (coronary artery disease) 1998, 1999    mix2, 3 stents [id number] stents total    CAP (community acquired pneumonia) 12/31/2020    Cardiomyopathy ([facility]) 11/10/2015    Complicated wound infection 11/10/2015    COPD (chronic obstructive pulmonary disease) with emphysema ([facility]) 11/10/2015    nebulizer and albuterol inhaler PRN, no home O2    COPD exacerbation ([facility]) 10/12/2011    COVID-19 12/31/2020    Diabetic neuropathy ([facility]) 11/10/2015    Disruption of wound, unspecified 10/09/2014    Encounter for long-term (current) use of other high-risk medications 11/10/2015    Extremity atherosclerosis with intermittent claudication ([facility]) 11/10/2015    H/O endarterectomy 11/10/2015    Hiatal hernia 11/10/2015    History of 2019 novel coronavirus disease (COVID-19)     12/31/2020- hospitalized    History of staph infection     Hyperglycemia due to type 1 diabetes mellitus ([facility]) 11/10/2015    Hyperlipidemia 11/10/2015    ICD (implantable

## 2024-11-06 PROBLEM — J44.9 CHRONIC OBSTRUCTIVE PULMONARY DISEASE (HCC): Status: ACTIVE | Noted: 2022-12-29

## 2024-11-07 ENCOUNTER — TELEPHONE (OUTPATIENT)
Dept: INTERNAL MEDICINE CLINIC | Facility: CLINIC | Age: 79
End: 2024-11-07

## 2024-11-07 NOTE — TELEPHONE ENCOUNTER
----- Message from Dr. Lexi Nieto MD sent at 11/7/2024  2:05 PM EST -----  Labs have have improved.  Continue your current doses of medications. Keep up the good work.  Hope you are feeling well.  Thanks.  [facility]

## 2024-11-07 NOTE — RESULT ENCOUNTER NOTE
Labs have have improved.  Continue your current doses of medications. Keep up the good work.  Hope you are feeling well.  Thanks.  [facility]

## 2024-11-07 NOTE — TELEPHONE ENCOUNTER
----- Message from Dr. Lexi Nieto MD sent at 11/7/2024  2:05 PM EST -----  Labs have have improved.  Continue your current doses of medications. Keep up the good work.  Hope you are feeling well.  Thanks.  [facility]

## 2024-11-20 RX ORDER — ALBUTEROL SULFATE 90 UG/1
INHALANT RESPIRATORY (INHALATION)
Qty: 18 G | Refills: 10 | OUTPATIENT
Start: 2024-11-20

## 2024-11-25 ENCOUNTER — OFFICE VISIT (OUTPATIENT)
Dept: PULMONOLOGY | Age: 79
End: 2024-11-25
Payer: MEDICARE

## 2024-11-25 VITALS
WEIGHT: 289.2 LBS | RESPIRATION RATE: 14 BRPM | TEMPERATURE: 97.4 F | OXYGEN SATURATION: 97 % | HEIGHT: 73 IN | DIASTOLIC BLOOD PRESSURE: 65 MMHG | SYSTOLIC BLOOD PRESSURE: 138 MMHG | BODY MASS INDEX: 38.33 KG/M2 | HEART RATE: 60 BPM

## 2024-11-25 DIAGNOSIS — I25.5 ISCHEMIC CARDIOMYOPATHY: ICD-10-CM

## 2024-11-25 DIAGNOSIS — G47.33 OBSTRUCTIVE SLEEP APNEA: ICD-10-CM

## 2024-11-25 DIAGNOSIS — F17.211 CIGARETTE NICOTINE DEPENDENCE IN REMISSION: ICD-10-CM

## 2024-11-25 DIAGNOSIS — J30.89 NON-SEASONAL ALLERGIC RHINITIS DUE TO OTHER ALLERGIC TRIGGER: ICD-10-CM

## 2024-11-25 DIAGNOSIS — Z79.899 LONG TERM CURRENT USE OF AMIODARONE: ICD-10-CM

## 2024-11-25 DIAGNOSIS — I48.0 PAROXYSMAL ATRIAL FIBRILLATION (HCC): ICD-10-CM

## 2024-11-25 DIAGNOSIS — Z77.090 CONTACT WITH AND (SUSPECTED) EXPOSURE TO ASBESTOS: ICD-10-CM

## 2024-11-25 DIAGNOSIS — Z95.810 ICD (IMPLANTABLE CARDIOVERTER-DEFIBRILLATOR) IN PLACE: ICD-10-CM

## 2024-11-25 DIAGNOSIS — I47.20 VENTRICULAR TACHYCARDIA (HCC): ICD-10-CM

## 2024-11-25 DIAGNOSIS — J44.9 COPD, SEVERE (HCC): Primary | ICD-10-CM

## 2024-11-25 DIAGNOSIS — J98.4 RESTRICTIVE LUNG DISEASE: ICD-10-CM

## 2024-11-25 LAB
EXPIRATORY TIME: NORMAL
FEF 25-75% %PRED-PRE: NORMAL
FEF 25-75% PRED: NORMAL
FEF 25-75-PRE: NORMAL
FEV1 %PRED-PRE: 56 %
FEV1 PRED: 3.14 L
FEV1/FVC %PRED-PRE: NORMAL
FEV1/FVC PRED: 94 %
FEV1/FVC: 70 %
FEV1: 1.77 L
FVC %PRED-PRE: 59 %
FVC PRED: 4.29 L
FVC: 2.54 L
PEF %PRED-PRE: NORMAL
PEF PRED: NORMAL
PEF-PRE: NORMAL

## 2024-11-25 PROCEDURE — G8482 FLU IMMUNIZE ORDER/ADMIN: HCPCS | Performed by: NURSE PRACTITIONER

## 2024-11-25 PROCEDURE — 4004F PT TOBACCO SCREEN RCVD TLK: CPT | Performed by: NURSE PRACTITIONER

## 2024-11-25 PROCEDURE — 1159F MED LIST DOCD IN RCRD: CPT | Performed by: NURSE PRACTITIONER

## 2024-11-25 PROCEDURE — G8417 CALC BMI ABV UP PARAM F/U: HCPCS | Performed by: NURSE PRACTITIONER

## 2024-11-25 PROCEDURE — 1123F ACP DISCUSS/DSCN MKR DOCD: CPT | Performed by: NURSE PRACTITIONER

## 2024-11-25 PROCEDURE — 99214 OFFICE O/P EST MOD 30 MIN: CPT | Performed by: NURSE PRACTITIONER

## 2024-11-25 PROCEDURE — 3075F SYST BP GE 130 - 139MM HG: CPT | Performed by: NURSE PRACTITIONER

## 2024-11-25 PROCEDURE — 94010 BREATHING CAPACITY TEST: CPT | Performed by: INTERNAL MEDICINE

## 2024-11-25 PROCEDURE — 3078F DIAST BP <80 MM HG: CPT | Performed by: NURSE PRACTITIONER

## 2024-11-25 PROCEDURE — G8427 DOCREV CUR MEDS BY ELIG CLIN: HCPCS | Performed by: NURSE PRACTITIONER

## 2024-11-25 PROCEDURE — 3023F SPIROM DOC REV: CPT | Performed by: NURSE PRACTITIONER

## 2024-11-25 PROCEDURE — 1160F RVW MEDS BY RX/DR IN RCRD: CPT | Performed by: NURSE PRACTITIONER

## 2024-11-25 RX ORDER — ALBUTEROL SULFATE 0.83 MG/ML
SOLUTION RESPIRATORY (INHALATION)
Qty: 360 ML | Refills: 11 | Status: SHIPPED | OUTPATIENT
Start: 2024-11-25

## 2024-11-25 RX ORDER — MONTELUKAST SODIUM 10 MG/1
10 TABLET ORAL NIGHTLY
Qty: 90 TABLET | Refills: 3 | Status: SHIPPED | OUTPATIENT
Start: 2024-11-25

## 2024-11-25 RX ORDER — FLUTICASONE PROPIONATE AND SALMETEROL 250; 50 UG/1; UG/1
POWDER RESPIRATORY (INHALATION)
Qty: 1 EACH | Refills: 11 | Status: SHIPPED | OUTPATIENT
Start: 2024-11-25

## 2024-11-25 RX ORDER — ALBUTEROL SULFATE 90 UG/1
INHALANT RESPIRATORY (INHALATION)
Qty: 18 G | Refills: 11 | Status: SHIPPED | OUTPATIENT
Start: 2024-11-25

## 2024-11-25 ASSESSMENT — PULMONARY FUNCTION TESTS
FVC_PREDICTED: 4.29
FEV1_PERCENT_PREDICTED_PRE: 56
FEV1_PREDICTED: 3.14
FEV1/FVC_PREDICTED: 94
FVC: 2.54
FVC_PERCENT_PREDICTED_PRE: 59
FEV1: 1.77
FEV1/FVC: 70

## 2024-11-25 ASSESSMENT — ENCOUNTER SYMPTOMS
WHEEZING: 0
COUGH: 0
HEMOPTYSIS: 0
SHORTNESS OF BREATH: 1
SPUTUM PRODUCTION: 0

## 2024-11-25 NOTE — PROGRESS NOTES
He is a 79-year-old male seen for follow-up of severe COPD, SHERI, history of [facility]  [address]  [place] [id number] [phone]    Patient Name:  Wilfred Guillen    YOB: 1945    Office Visit 11/25/2024      CHIEF COMPLAINT:      Chief Complaint   Patient presents with   • Follow-up   • COPD         ASSESSMENT:   (Medical Decision Making)                                                                                                                                          Encounter Diagnoses   Name Primary?   • COPD, severe (HCC) Yes   • Non-seasonal allergic rhinitis due to other allergic trigger    • Obstructive sleep apnea    • Contact with and (suspected) exposure to asbestos    • Cigarette nicotine dependence in remission                            PLAN:         Orders Placed This Encounter   Procedures   • Spirometry Without Bronchodilator       No orders of the defined types were placed in this encounter.          AARON VALERIO - CNP    Total  time spent with patient -  min.     Collaborating MD: Dr. Hugh boyd      HISTORY OF PRESENT ILLNESS:    asbestos exposure, prior tobacco history of 50 pack years, having quit in 2011.    DIAGNOSTICS:        Spirometry 5/2013: FEV1 1.70 Final FEV1 % Pred 46 % Final FVC 2.55 Final FVC % Pred 52 % Final FEV1/F; Significant  interval decline in FVC and FEV (previously 67% and 58% of predicted, or 3.25 and 2.16 L respectively).  Chest CT 10/2014 - Negative for pulmonary embolus; basilar atelectasis. Stable left adrenal mass.  Spirometry:  10/12/2015 - Moderate restrictive defect, significant interval improvement in FVC  and FEV1.  Spirometry 4/11/0216 - moderate restrictive and obstructive defects, no significant interval change.  CXR 10/2016-linear density in both lung bases.  Could reflect some atelectasis and/or scarring  Spirometry 11/17/2016 - moderate restrictive defect.  Spirometry 2/14/2018-moderate

## 2024-11-25 NOTE — PATIENT INSTRUCTIONS
Advised to resume Advair 1 inhalation twice daily, gargle with water after use.    Albuterol inhaler or nebulizer 4 times daily as needed for shortness of breath or wheezing.    Continue Singulair at bedtime.    Flonase nasal spray 1 to 2 sprays each nostril daily as needed for control of nasal drainage/congestion.    Continue CPAP as prescribed.    Recommend Prevnar 20 pneumonia vaccine if insurance will reimburse for it.  He is up-to-date on COVID, RSV, flu vaccines.  He has had Prevnar 13 and PPSV23 23 several years ago.    Follow-up in 6 months.  Complete pulmonary function test will be obtained at that time.

## 2024-12-12 ENCOUNTER — OFFICE VISIT (OUTPATIENT)
Dept: UROLOGY | Age: 79
End: 2024-12-12
Payer: MEDICARE

## 2024-12-12 DIAGNOSIS — N40.1 BPH WITH OBSTRUCTION/LOWER URINARY TRACT SYMPTOMS: Primary | ICD-10-CM

## 2024-12-12 DIAGNOSIS — N52.9 ERECTILE DYSFUNCTION, UNSPECIFIED ERECTILE DYSFUNCTION TYPE: ICD-10-CM

## 2024-12-12 DIAGNOSIS — N13.8 BPH WITH OBSTRUCTION/LOWER URINARY TRACT SYMPTOMS: Primary | ICD-10-CM

## 2024-12-12 LAB
BILIRUBIN, URINE, POC: NEGATIVE
BLOOD URINE, POC: NEGATIVE
GLUCOSE URINE, POC: NEGATIVE MG/DL
KETONES, URINE, POC: NEGATIVE MG/DL
LEUKOCYTE ESTERASE, URINE, POC: NORMAL
NITRITE, URINE, POC: NEGATIVE
PH, URINE, POC: 5.5 (ref 4.6–8)
PROTEIN,URINE, POC: NEGATIVE MG/DL
SPECIFIC GRAVITY, URINE, POC: 1.01 (ref 1–1.03)
URINALYSIS CLARITY, POC: NORMAL
URINALYSIS COLOR, POC: NORMAL
UROBILINOGEN, POC: NORMAL MG/DL

## 2024-12-12 PROCEDURE — G8427 DOCREV CUR MEDS BY ELIG CLIN: HCPCS | Performed by: NURSE PRACTITIONER

## 2024-12-12 PROCEDURE — G8482 FLU IMMUNIZE ORDER/ADMIN: HCPCS | Performed by: NURSE PRACTITIONER

## 2024-12-12 PROCEDURE — 1123F ACP DISCUSS/DSCN MKR DOCD: CPT | Performed by: NURSE PRACTITIONER

## 2024-12-12 PROCEDURE — 99214 OFFICE O/P EST MOD 30 MIN: CPT | Performed by: NURSE PRACTITIONER

## 2024-12-12 PROCEDURE — 4004F PT TOBACCO SCREEN RCVD TLK: CPT | Performed by: NURSE PRACTITIONER

## 2024-12-12 PROCEDURE — 1160F RVW MEDS BY RX/DR IN RCRD: CPT | Performed by: NURSE PRACTITIONER

## 2024-12-12 PROCEDURE — 81003 URINALYSIS AUTO W/O SCOPE: CPT | Performed by: NURSE PRACTITIONER

## 2024-12-12 PROCEDURE — 1159F MED LIST DOCD IN RCRD: CPT | Performed by: NURSE PRACTITIONER

## 2024-12-12 PROCEDURE — G8417 CALC BMI ABV UP PARAM F/U: HCPCS | Performed by: NURSE PRACTITIONER

## 2024-12-12 ASSESSMENT — ENCOUNTER SYMPTOMS: BACK PAIN: 0

## 2024-12-20 RX ORDER — ROSUVASTATIN CALCIUM 10 MG/1
TABLET, COATED ORAL
Qty: 90 TABLET | Refills: 3 | Status: SHIPPED | OUTPATIENT
Start: 2024-12-20

## 2025-01-15 ENCOUNTER — OFFICE VISIT (OUTPATIENT)
Age: 80
End: 2025-01-15
Payer: MEDICARE

## 2025-01-15 VITALS
BODY MASS INDEX: 38.17 KG/M2 | DIASTOLIC BLOOD PRESSURE: 84 MMHG | SYSTOLIC BLOOD PRESSURE: 138 MMHG | HEART RATE: 60 BPM | HEIGHT: 73 IN | WEIGHT: 288 LBS

## 2025-01-15 DIAGNOSIS — I73.9 PAD (PERIPHERAL ARTERY DISEASE) (HCC): ICD-10-CM

## 2025-01-15 DIAGNOSIS — I48.0 PAROXYSMAL ATRIAL FIBRILLATION (HCC): ICD-10-CM

## 2025-01-15 DIAGNOSIS — I25.10 CORONARY ARTERY DISEASE INVOLVING NATIVE CORONARY ARTERY OF NATIVE HEART WITHOUT ANGINA PECTORIS: ICD-10-CM

## 2025-01-15 DIAGNOSIS — I10 PRIMARY HYPERTENSION: ICD-10-CM

## 2025-01-15 DIAGNOSIS — I47.20 VT (VENTRICULAR TACHYCARDIA) (HCC): Primary | ICD-10-CM

## 2025-01-15 DIAGNOSIS — Z95.810 ICD (IMPLANTABLE CARDIOVERTER-DEFIBRILLATOR) IN PLACE: ICD-10-CM

## 2025-01-15 PROCEDURE — 3075F SYST BP GE 130 - 139MM HG: CPT | Performed by: INTERNAL MEDICINE

## 2025-01-15 PROCEDURE — G8417 CALC BMI ABV UP PARAM F/U: HCPCS | Performed by: INTERNAL MEDICINE

## 2025-01-15 PROCEDURE — 1160F RVW MEDS BY RX/DR IN RCRD: CPT | Performed by: INTERNAL MEDICINE

## 2025-01-15 PROCEDURE — 99214 OFFICE O/P EST MOD 30 MIN: CPT | Performed by: INTERNAL MEDICINE

## 2025-01-15 PROCEDURE — 1123F ACP DISCUSS/DSCN MKR DOCD: CPT | Performed by: INTERNAL MEDICINE

## 2025-01-15 PROCEDURE — G8427 DOCREV CUR MEDS BY ELIG CLIN: HCPCS | Performed by: INTERNAL MEDICINE

## 2025-01-15 PROCEDURE — 4004F PT TOBACCO SCREEN RCVD TLK: CPT | Performed by: INTERNAL MEDICINE

## 2025-01-15 PROCEDURE — 1126F AMNT PAIN NOTED NONE PRSNT: CPT | Performed by: INTERNAL MEDICINE

## 2025-01-15 PROCEDURE — 3079F DIAST BP 80-89 MM HG: CPT | Performed by: INTERNAL MEDICINE

## 2025-01-15 PROCEDURE — 1159F MED LIST DOCD IN RCRD: CPT | Performed by: INTERNAL MEDICINE

## 2025-01-15 RX ORDER — CLOPIDOGREL BISULFATE 75 MG/1
75 TABLET ORAL DAILY
Qty: 90 TABLET | Refills: 3 | Status: SHIPPED | OUTPATIENT
Start: 2025-01-15

## 2025-01-15 ASSESSMENT — ENCOUNTER SYMPTOMS
BLURRED VISION: 0
HOARSE VOICE: 0
HEMATEMESIS: 0
WHEEZING: 0
BACK PAIN: 1
HEMATOCHEZIA: 0
DIARRHEA: 0
ABDOMINAL PAIN: 0
SHORTNESS OF BREATH: 0
COLOR CHANGE: 0
ORTHOPNEA: 0
SPUTUM PRODUCTION: 0
CHEST TIGHTNESS: 0
BOWEL INCONTINENCE: 0

## 2025-01-15 NOTE — PROGRESS NOTES
[facility] CARDIOLOGY  [address] [place] [address], [address]  [place] [address]  PHONE: [phone]        01/15/25        NAME:  Wilfred Guillen  : 1945  MRN: [id number]       SUBJECTIVE:   Wilfred Guillen is a 79 y.o. male seen for a follow up visit regarding the following: CAD,COPD,PAF,VT, PAD,and ICD.He returns for scheduled follow up.Overall,he reports doing ok except for chronic knee pain,back pain,and right shoulder pain.    Chief Complaint   Patient presents with    Coronary Artery Disease       HPI:    Coronary Artery Disease  Presents for follow-up visit. Pertinent negatives include no chest pain, chest pressure, chest tightness, dizziness, leg swelling, muscle weakness, palpitations, shortness of breath or weight gain. The symptoms have been stable.       Past Medical History, Past Surgical History, Family history, Social History, and Medications were all reviewed with the patient today and updated as necessary.         Current Outpatient Medications:     rosuvastatin (CRESTOR) 10 MG tablet, TAKE 1 TABLET BY MOUTH EVERY DAY, Disp: 90 tablet, Rfl: 3    montelukast (SINGULAIR) 10 MG tablet, Take 1 tablet by mouth nightly, Disp: 90 tablet, Rfl: 3    fluticasone-salmeterol (ADVAIR) 250-50 MCG/ACT AEPB diskus inhaler, 1 inhalation twice daily, rinse mouth after use, Disp: 1 each, Rfl: 11    albuterol (PROVENTIL) (2.5 MG/3ML) 0.083% nebulizer solution, 1 vial via nebulizer 4 times daily if needed for shortness of breath or wheezing.  Diagnosis-COPD, J44.9.  Bill to Medicare part B, Disp: 360 mL, Rfl: 11    albuterol sulfate HFA (PROVENTIL;VENTOLIN;PROAIR) 108 (90 Base) MCG/ACT inhaler, USE 2 PUFFS BY INHALATION 4 TIMES DAILY AS NEEDED FOR WHEEZING OR SHORTNESS OF BREATH. Patient prefers ventolin., Disp: 18 g, Rfl: 11    Multiple Vitamins-Minerals (PRESERVISION AREDS PO), Take by mouth, Disp: , Rfl:     Glucosamine HCl (GLUCOSAMINE PO), Take by mouth, Disp: , Rfl:     clopidogrel (PLAVIX) 75 MG tablet,

## 2025-01-16 DIAGNOSIS — I10 PRIMARY HYPERTENSION: ICD-10-CM

## 2025-01-16 PROCEDURE — 93296 REM INTERROG EVL PM/IDS: CPT | Performed by: INTERNAL MEDICINE

## 2025-01-16 PROCEDURE — 93295 DEV INTERROG REMOTE 1/2/MLT: CPT | Performed by: INTERNAL MEDICINE

## 2025-01-16 RX ORDER — LISINOPRIL 10 MG/1
10 TABLET ORAL DAILY
Qty: 90 TABLET | Refills: 3 | Status: SHIPPED | OUTPATIENT
Start: 2025-01-16

## 2025-01-23 NOTE — CARE COORDINATION
Advance care planning documents on file - yes     Recent PHQ 2/9 Score    PHQ 2:  PHQ 2 Score Adult PHQ 2 Score Adult PHQ 2 Interpretation Little interest or pleasure in activity?   1/23/2025  10:38 AM 0 No further screening needed 0       PHQ 9:     PHQ-2/9 Depression Screening  Little interest or pleasure in activity?: Not at all  Feeling down, depressed or hopeless?: Not at all  Initial depression screening score:: 0  PHQ2 Interpretation: No further screening needed         Health Maintenance       Medicare Advantage- Medicare Wellness Visit (Yearly - January to December)  Due since 1/1/2025           Following review of the above:  Health maintenance topics up to date.    Note: Refer to final orders and clinician documentation.                   MSN, CM:  spoke with patient this AM about discharge planning and rehab choices. Patient does not want [facility] or [facility] but would be willing to try [facility] and [facility]. Patient would like wife to tour facility before making a choice. Will make referral on both. Case Management will continue to follow. 1.582

## 2025-02-04 NOTE — TELEPHONE ENCOUNTER
Done.  Eat yogurt every day while you are on antibiotics. Thanks.   Dung Arambula
Pt has been informed.
Wife Tana De La Garza calling, stated that they seen the wound center yesterday, The doctor failed to refill the patient's doxycycline and the nurse called her back last night and wanted him to go ahead and start it. He has an appt here on Thurs. Tana De La Garza is asking if someone will go ahead and send in rx to [facility] in [place]? See note from wound care in chart.    LT
No

## 2025-03-03 DIAGNOSIS — D50.8 OTHER IRON DEFICIENCY ANEMIA: ICD-10-CM

## 2025-03-03 RX ORDER — GLIPIZIDE 10 MG/1
10 TABLET, FILM COATED, EXTENDED RELEASE ORAL DAILY
Qty: 90 TABLET | Refills: 3 | Status: SHIPPED | OUTPATIENT
Start: 2025-03-03

## 2025-03-03 RX ORDER — FERROUS SULFATE 325(65) MG
1 TABLET ORAL
Qty: 90 TABLET | Refills: 3 | Status: SHIPPED | OUTPATIENT
Start: 2025-03-03

## 2025-03-20 ENCOUNTER — OFFICE VISIT (OUTPATIENT)
Dept: INTERNAL MEDICINE CLINIC | Facility: CLINIC | Age: 80
End: 2025-03-20

## 2025-03-20 VITALS
TEMPERATURE: 97.8 F | OXYGEN SATURATION: 98 % | HEIGHT: 73 IN | SYSTOLIC BLOOD PRESSURE: 135 MMHG | DIASTOLIC BLOOD PRESSURE: 59 MMHG | WEIGHT: 283 LBS | BODY MASS INDEX: 37.51 KG/M2 | HEART RATE: 60 BPM

## 2025-03-20 DIAGNOSIS — Z00.00 MEDICARE ANNUAL WELLNESS VISIT, SUBSEQUENT: ICD-10-CM

## 2025-03-20 DIAGNOSIS — I47.20 VT (VENTRICULAR TACHYCARDIA) (HCC): ICD-10-CM

## 2025-03-20 DIAGNOSIS — G95.19 INTERMITTENT SPINAL CLAUDICATION (HCC): ICD-10-CM

## 2025-03-20 DIAGNOSIS — K59.00 CONSTIPATION, UNSPECIFIED CONSTIPATION TYPE: ICD-10-CM

## 2025-03-20 DIAGNOSIS — N18.31 STAGE 3A CHRONIC KIDNEY DISEASE (HCC): ICD-10-CM

## 2025-03-20 DIAGNOSIS — E66.01 MORBID (SEVERE) OBESITY DUE TO EXCESS CALORIES: ICD-10-CM

## 2025-03-20 DIAGNOSIS — E11.21 TYPE 2 DIABETES WITH NEPHROPATHY (HCC): Primary | ICD-10-CM

## 2025-03-20 DIAGNOSIS — R35.1 NOCTURIA: ICD-10-CM

## 2025-03-20 DIAGNOSIS — I50.22 CHRONIC SYSTOLIC (CONGESTIVE) HEART FAILURE: ICD-10-CM

## 2025-03-20 DIAGNOSIS — Z95.810 ICD (IMPLANTABLE CARDIOVERTER-DEFIBRILLATOR) IN PLACE: ICD-10-CM

## 2025-03-20 DIAGNOSIS — I70.222 ATHEROSCLEROSIS OF NATIVE ARTERIES OF EXTREMITIES WITH REST PAIN, LEFT LEG: ICD-10-CM

## 2025-03-20 DIAGNOSIS — J44.9 COPD, SEVERE (HCC): ICD-10-CM

## 2025-03-20 DIAGNOSIS — E11.51 DM (DIABETES MELLITUS) TYPE II, CONTROLLED, WITH PERIPHERAL VASCULAR DISORDER (HCC): ICD-10-CM

## 2025-03-20 DIAGNOSIS — E11.21 TYPE 2 DIABETES WITH NEPHROPATHY (HCC): ICD-10-CM

## 2025-03-20 DIAGNOSIS — Z12.5 SCREENING FOR PROSTATE CANCER: ICD-10-CM

## 2025-03-20 LAB
ALBUMIN SERPL-MCNC: 4.1 G/DL (ref 3.2–4.6)
ALBUMIN/GLOB SERPL: 1.5 (ref 1–1.9)
ALP SERPL-CCNC: 65 U/L (ref 40–129)
ALT SERPL-CCNC: 28 U/L (ref 8–55)
ANION GAP SERPL CALC-SCNC: 11 MMOL/L (ref 7–16)
APPEARANCE UR: CLEAR
AST SERPL-CCNC: 20 U/L (ref 15–37)
BACTERIA URNS QL MICRO: NEGATIVE /HPF
BASOPHILS # BLD: 0.02 K/UL (ref 0–0.2)
BASOPHILS NFR BLD: 0.3 % (ref 0–2)
BILIRUB SERPL-MCNC: 0.5 MG/DL (ref 0–1.2)
BILIRUB UR QL: NEGATIVE
BUN SERPL-MCNC: 24 MG/DL (ref 8–23)
CALCIUM SERPL-MCNC: 9.2 MG/DL (ref 8.8–10.2)
CHLORIDE SERPL-SCNC: 103 MMOL/L (ref 98–107)
CHOLEST SERPL-MCNC: 106 MG/DL (ref 0–200)
CO2 SERPL-SCNC: 24 MMOL/L (ref 20–29)
COLOR UR: ABNORMAL
CREAT SERPL-MCNC: 1.56 MG/DL (ref 0.8–1.3)
CREAT UR-MCNC: 122 MG/DL (ref 39–259)
DIFFERENTIAL METHOD BLD: ABNORMAL
EOSINOPHIL # BLD: 0.03 K/UL (ref 0–0.8)
EOSINOPHIL NFR BLD: 0.5 % (ref 0.5–7.8)
EPI CELLS #/AREA URNS HPF: ABNORMAL /HPF (ref 0–5)
ERYTHROCYTE [DISTWIDTH] IN BLOOD BY AUTOMATED COUNT: 17.2 % (ref 11.9–14.6)
EST. AVERAGE GLUCOSE BLD GHB EST-MCNC: 134 MG/DL
GLOBULIN SER CALC-MCNC: 2.8 G/DL (ref 2.3–3.5)
GLUCOSE SERPL-MCNC: 153 MG/DL (ref 70–99)
GLUCOSE UR STRIP.AUTO-MCNC: NEGATIVE MG/DL
HBA1C MFR BLD: 6.3 % (ref 0–5.6)
HCT VFR BLD AUTO: 31.7 % (ref 41.1–50.3)
HDLC SERPL-MCNC: 48 MG/DL (ref 40–60)
HDLC SERPL: 2.2 (ref 0–5)
HGB BLD-MCNC: 10.6 G/DL (ref 13.6–17.2)
HGB UR QL STRIP: NEGATIVE
HYALINE CASTS URNS QL MICRO: ABNORMAL /LPF
IMM GRANULOCYTES # BLD AUTO: 0.02 K/UL (ref 0–0.5)
IMM GRANULOCYTES NFR BLD AUTO: 0.3 % (ref 0–5)
KETONES UR QL STRIP.AUTO: NEGATIVE MG/DL
LDLC SERPL CALC-MCNC: 37 MG/DL (ref 0–100)
LEUKOCYTE ESTERASE UR QL STRIP.AUTO: ABNORMAL
LYMPHOCYTES # BLD: 1.1 K/UL (ref 0.5–4.6)
LYMPHOCYTES NFR BLD: 18.2 % (ref 13–44)
MAGNESIUM SERPL-MCNC: 2.4 MG/DL (ref 1.8–2.4)
MCH RBC QN AUTO: 32.4 PG (ref 26.1–32.9)
MCHC RBC AUTO-ENTMCNC: 33.4 G/DL (ref 31.4–35)
MCV RBC AUTO: 96.9 FL (ref 82–102)
MICROALBUMIN UR-MCNC: 3.34 MG/DL (ref 0–20)
MICROALBUMIN/CREAT UR-RTO: 27 MG/G (ref 0–30)
MONOCYTES # BLD: 0.41 K/UL (ref 0.1–1.3)
MONOCYTES NFR BLD: 6.8 % (ref 4–12)
NEUTS SEG # BLD: 4.46 K/UL (ref 1.7–8.2)
NEUTS SEG NFR BLD: 73.9 % (ref 43–78)
NITRITE UR QL STRIP.AUTO: NEGATIVE
NRBC # BLD: 0 K/UL (ref 0–0.2)
PH UR STRIP: 5.5 (ref 5–9)
PLATELET # BLD AUTO: 147 K/UL (ref 150–450)
PMV BLD AUTO: 11.5 FL (ref 9.4–12.3)
POTASSIUM SERPL-SCNC: 5 MMOL/L (ref 3.5–5.1)
PROT SERPL-MCNC: 6.8 G/DL (ref 6.3–8.2)
PROT UR STRIP-MCNC: NEGATIVE MG/DL
PSA SERPL-MCNC: 4.4 NG/ML (ref 0–4)
RBC # BLD AUTO: 3.27 M/UL (ref 4.23–5.6)
RBC #/AREA URNS HPF: ABNORMAL /HPF (ref 0–5)
SODIUM SERPL-SCNC: 138 MMOL/L (ref 136–145)
SP GR UR REFRACTOMETRY: 1.01 (ref 1–1.02)
TRIGL SERPL-MCNC: 102 MG/DL (ref 0–150)
TSH W FREE THYROID IF ABNORMAL: 2.43 UIU/ML (ref 0.27–4.2)
UROBILINOGEN UR QL STRIP.AUTO: 1 EU/DL (ref 0.2–1)
VLDLC SERPL CALC-MCNC: 20 MG/DL (ref 6–23)
WBC # BLD AUTO: 6 K/UL (ref 4.3–11.1)
WBC URNS QL MICRO: ABNORMAL /HPF (ref 0–4)

## 2025-03-20 RX ORDER — FLUTICASONE PROPIONATE 50 MCG
2 SPRAY, SUSPENSION (ML) NASAL DAILY PRN
Qty: 3 EACH | Refills: 3 | Status: SHIPPED | OUTPATIENT
Start: 2025-03-20

## 2025-03-20 RX ORDER — CARVEDILOL 6.25 MG/1
6.25 TABLET ORAL 2 TIMES DAILY WITH MEALS
Qty: 180 TABLET | Refills: 3 | Status: SHIPPED | OUTPATIENT
Start: 2025-03-20

## 2025-03-20 RX ORDER — FUROSEMIDE 20 MG/1
20 TABLET ORAL DAILY
Qty: 30 TABLET | Refills: 11 | Status: SHIPPED | OUTPATIENT
Start: 2025-03-20

## 2025-03-20 RX ORDER — LORATADINE 10 MG/1
10 TABLET ORAL DAILY
Qty: 1 TABLET | Refills: 0
Start: 2025-03-20

## 2025-03-20 SDOH — ECONOMIC STABILITY: FOOD INSECURITY: WITHIN THE PAST 12 MONTHS, YOU WORRIED THAT YOUR FOOD WOULD RUN OUT BEFORE YOU GOT MONEY TO BUY MORE.: NEVER TRUE

## 2025-03-20 SDOH — ECONOMIC STABILITY: FOOD INSECURITY: WITHIN THE PAST 12 MONTHS, THE FOOD YOU BOUGHT JUST DIDN'T LAST AND YOU DIDN'T HAVE MONEY TO GET MORE.: NEVER TRUE

## 2025-03-20 ASSESSMENT — ENCOUNTER SYMPTOMS
BLOOD IN STOOL: 0
VOMITING: 0
RHINORRHEA: 1
SHORTNESS OF BREATH: 0
DIARRHEA: 0
BACK PAIN: 1
CONSTIPATION: 1
COUGH: 0
WHEEZING: 1
NAUSEA: 0

## 2025-03-20 ASSESSMENT — PATIENT HEALTH QUESTIONNAIRE - PHQ9
SUM OF ALL RESPONSES TO PHQ QUESTIONS 1-9: 0
1. LITTLE INTEREST OR PLEASURE IN DOING THINGS: NOT AT ALL
2. FEELING DOWN, DEPRESSED OR HOPELESS: NOT AT ALL
SUM OF ALL RESPONSES TO PHQ QUESTIONS 1-9: 0

## 2025-03-20 NOTE — PATIENT INSTRUCTIONS
motion in joints and muscles.  Includes upper arm stretches, calf stretches, and gentle yoga.  Aim for at least twice a week, preferably after your muscles are warmed up from other activities.  It can help you function better in daily life.  Balancing.  This helps you stay coordinated and have good posture.  Includes heel-to-toe walking, maria esther chi, and certain types of yoga.  Aim for at least 3 days a week.  It can reduce your risk of falling.  Even if you have a hard time meeting the recommendations, it's better to be more active than less active. All activity done in each category counts toward your weekly total. You'd be surprised how daily things like carrying groceries, keeping up with grandchildren, and taking the stairs can add up.  What keeps you from being active?  If you've had a hard time being more active, you're not alone. Maybe you remember being able to do more. Or maybe you've never thought of yourself as being active. It's frustrating when you can't do the things you want. Being more active can help. What's holding you back?  Getting started.  Have a goal, but break it into easy tasks. Small steps build into big accomplishments.  Staying motivated.  If you feel like skipping your activity, remember your goal. Maybe you want to move better and stay independent. Every activity gets you one step closer.  Not feeling your best.  Start with 5 minutes of an activity you enjoy. Prove to yourself you can do it. As you get comfortable, increase your time.  You may not be where you want to be. But you're in the process of getting there. Everyone starts somewhere.  How can you find safe ways to stay active?  Talk with your doctor about any physical challenges you're facing. Make a plan with your doctor if you have a health problem or aren't sure how to get started with activity.  If you're already active, ask your doctor if there is anything you should change to stay safe as your body and health change.  If you

## 2025-03-20 NOTE — PROGRESS NOTES
earlier as needed.  Knows to keep a low threshold for contacting the office with worsening symptoms.  Try adding claritin to his regimen.  Call if worse or no better in the next 2-3 days.  No issues with claudication.          Return in about 6 months (around 9/20/2025) for  30 min.          Lexi Nieto MD  Medicare Annual Wellness Visit    Wilfred Guillen is here for Medicare AWV and 6 Month Follow-Up (HTN, COPD, )    Assessment & Plan   Type 2 diabetes with nephropathy (HCC)  -     Albumin/Creatinine Ratio, Urine; Future  -     Hemoglobin A1C; Future  -     Urinalysis; Future  -     Comprehensive Metabolic Panel; Future  -     TSH reflex to FT4; Future  -     Lipid Panel; Future  -     CBC with Auto Differential; Future  Medicare annual wellness visit, subsequent  ICD (implantable cardioverter-defibrillator) in place  Intermittent spinal claudication (HCC)  Atherosclerosis of native arteries of extremities with rest pain, left leg (HCC)  Chronic systolic (congestive) heart failure (HCC)  COPD, severe (HCC)  Stage 3a chronic kidney disease (HCC)  VT (ventricular tachycardia) (HCC)  DM (diabetes mellitus) type II, controlled, with peripheral vascular disorder (HCC)  Nocturia  -     PSA, Diagnostic; Future  Screening for prostate cancer  -     PSA, Diagnostic; Future  Constipation, unspecified constipation type  -     Magnesium; Future  Morbid (severe) obesity due to excess calories (E66.01)  Body mass index [BMI] 37.0-37.9, adult (Z68.37)     Return in about 6 months (around 9/20/2025) for  30 min.     Subjective       Patient's complete Health Risk Assessment and screening values have been reviewed and are found in Flowsheets. The following problems were reviewed today and where indicated follow up appointments were made and/or referrals ordered.    Positive Risk Factor Screenings with Interventions:    Fall Risk:  Do you feel unsteady or are you worried about falling? : (!) yes  2 or more falls in past

## 2025-03-23 ENCOUNTER — RESULTS FOLLOW-UP (OUTPATIENT)
Dept: INTERNAL MEDICINE CLINIC | Facility: CLINIC | Age: 80
End: 2025-03-23

## 2025-03-23 DIAGNOSIS — R79.89 ELEVATED SERUM CREATININE: ICD-10-CM

## 2025-03-23 DIAGNOSIS — R97.20 ELEVATED PSA: Primary | ICD-10-CM

## 2025-03-24 NOTE — RESULT ENCOUNTER NOTE
Labs are stable except PSA is a little elevated and kidney function is off a bit.  I'd like for you to drink more water and repeat labs in 1 month.  Hope you are feeling well.   Continue your current doses of medications. Keep up the good work.  Thanks.  [facility]

## 2025-03-25 RX ORDER — ALBUTEROL SULFATE 90 UG/1
INHALANT RESPIRATORY (INHALATION)
Qty: 9 G | Refills: 10 | OUTPATIENT
Start: 2025-03-25

## 2025-03-25 NOTE — TELEPHONE ENCOUNTER
----- Message from Dr. Lexi Nieto MD sent at 3/23/2025  8:40 PM EDT -----  Labs are stable except PSA is a little elevated and kidney function is off a bit.  I'd like for you to drink more water and repeat labs in 1 month.  Hope you are feeling well.   Continue your current doses of medications. Keep up the good work.  Thanks.  [facility]

## 2025-03-31 RX ORDER — AMIODARONE HYDROCHLORIDE 200 MG/1
200 TABLET ORAL DAILY
Qty: 90 TABLET | Refills: 3 | Status: SHIPPED | OUTPATIENT
Start: 2025-03-31

## 2025-03-31 NOTE — TELEPHONE ENCOUNTER
Medication Refill Request      Name of Medication : amiodarone (CORDARONE)       Strength of Medication: 200 MG tablet       Directions: TAKE 1 TABLET BY MOUTH EVERY DAY, Disp-90 tablet       30 day or 90 day supply: 90      Preferred Pharmacy: [facility] [place] [phone] - F [phone]     Additional Information For Provider:

## 2025-04-28 DIAGNOSIS — R79.89 ELEVATED SERUM CREATININE: ICD-10-CM

## 2025-04-28 DIAGNOSIS — R97.20 ELEVATED PSA: ICD-10-CM

## 2025-04-28 LAB
ANION GAP SERPL CALC-SCNC: 12 MMOL/L (ref 7–16)
BUN SERPL-MCNC: 17 MG/DL (ref 8–23)
CALCIUM SERPL-MCNC: 8.9 MG/DL (ref 8.8–10.2)
CHLORIDE SERPL-SCNC: 104 MMOL/L (ref 98–107)
CO2 SERPL-SCNC: 26 MMOL/L (ref 20–29)
CREAT SERPL-MCNC: 1.25 MG/DL (ref 0.8–1.3)
GLUCOSE SERPL-MCNC: 153 MG/DL (ref 70–99)
POTASSIUM SERPL-SCNC: 4.5 MMOL/L (ref 3.5–5.1)
PSA SERPL-MCNC: 4.7 NG/ML (ref 0–4)
SODIUM SERPL-SCNC: 141 MMOL/L (ref 136–145)

## 2025-05-02 ENCOUNTER — RESULTS FOLLOW-UP (OUTPATIENT)
Dept: INTERNAL MEDICINE CLINIC | Facility: CLINIC | Age: 80
End: 2025-05-02

## 2025-05-02 NOTE — RESULT ENCOUNTER NOTE
Labs have have improved except that PSA is minimally more elevated.  Continue your current doses of medications. Keep up the good work.  Hope you are feeling well.  Thanks.  [facility]

## 2025-05-06 NOTE — TELEPHONE ENCOUNTER
I have talked to Mr. Guillen and have given  him this message. He wants Latrice to call him to explain to him what would cause his PSA to go up? And what can he do to prevent it.   Please call his number at [phone]

## 2025-05-06 NOTE — TELEPHONE ENCOUNTER
----- Message from LUANNE RANDALL MA sent at 5/5/2025  9:34 AM EDT -----    ----- Message -----  From: Lexi Nieto MD  Sent: 5/2/2025   3:56 PM EDT  To: Latrice Rodrigues MA    Labs have have improved except that PSA is minimally more elevated.  Continue your current doses of medications. Keep up the good work.  Hope you are feeling well.  Thanks.  [facility]

## 2025-05-06 NOTE — TELEPHONE ENCOUNTER
Pt wanted to know if he should be concerned with his elevated PSA level. He wanted to know if there is anything for him to take or do for this?

## 2025-05-07 NOTE — TELEPHONE ENCOUNTER
----- Message from Dr. Lexi Nieto MD sent at 5/7/2025 11:38 AM EDT -----  No sir.  We will simply repeat this in 6 months.  Not a cause for concern. Thanks.  [facility]

## 2025-05-07 NOTE — RESULT ENCOUNTER NOTE
No sir.  We will simply repeat this in 6 months.  Not a cause for concern. Thanks.  [facility]

## 2025-05-08 NOTE — TELEPHONE ENCOUNTER
----- Message from Dr. Lexi Nieto MD sent at 5/7/2025 11:38 AM EDT -----  No sir.  We will simply repeat this in 6 months.  Not a cause for concern. Thanks.  [facility]

## 2025-05-22 ENCOUNTER — CLINICAL SUPPORT (OUTPATIENT)
Dept: PULMONOLOGY | Age: 80
End: 2025-05-22

## 2025-05-22 ENCOUNTER — OFFICE VISIT (OUTPATIENT)
Dept: PULMONOLOGY | Age: 80
End: 2025-05-22
Payer: MEDICARE

## 2025-05-22 VITALS
BODY MASS INDEX: 38.01 KG/M2 | OXYGEN SATURATION: 96 % | HEART RATE: 73 BPM | TEMPERATURE: 97.4 F | SYSTOLIC BLOOD PRESSURE: 136 MMHG | RESPIRATION RATE: 19 BRPM | WEIGHT: 286.8 LBS | DIASTOLIC BLOOD PRESSURE: 78 MMHG | HEIGHT: 73 IN

## 2025-05-22 DIAGNOSIS — J98.4 RESTRICTIVE LUNG DISEASE: ICD-10-CM

## 2025-05-22 DIAGNOSIS — J30.89 NON-SEASONAL ALLERGIC RHINITIS DUE TO OTHER ALLERGIC TRIGGER: ICD-10-CM

## 2025-05-22 DIAGNOSIS — F17.211 CIGARETTE NICOTINE DEPENDENCE IN REMISSION: ICD-10-CM

## 2025-05-22 DIAGNOSIS — J44.9 COPD, SEVERE (HCC): Primary | ICD-10-CM

## 2025-05-22 DIAGNOSIS — Z79.899 LONG TERM CURRENT USE OF AMIODARONE: ICD-10-CM

## 2025-05-22 DIAGNOSIS — G47.33 OBSTRUCTIVE SLEEP APNEA: ICD-10-CM

## 2025-05-22 DIAGNOSIS — Z77.090 CONTACT WITH AND (SUSPECTED) EXPOSURE TO ASBESTOS: ICD-10-CM

## 2025-05-22 LAB
FEV 1 , POC: 1.72 L
FEV1 % PRED, POC: 49 %
FEV1/FVC, POC: NORMAL
FVC % PRED, POC: 57 %
FVC, POC: NORMAL

## 2025-05-22 PROCEDURE — 1126F AMNT PAIN NOTED NONE PRSNT: CPT | Performed by: NURSE PRACTITIONER

## 2025-05-22 PROCEDURE — G8417 CALC BMI ABV UP PARAM F/U: HCPCS | Performed by: NURSE PRACTITIONER

## 2025-05-22 PROCEDURE — 3078F DIAST BP <80 MM HG: CPT | Performed by: NURSE PRACTITIONER

## 2025-05-22 PROCEDURE — 3075F SYST BP GE 130 - 139MM HG: CPT | Performed by: NURSE PRACTITIONER

## 2025-05-22 PROCEDURE — G8427 DOCREV CUR MEDS BY ELIG CLIN: HCPCS | Performed by: NURSE PRACTITIONER

## 2025-05-22 PROCEDURE — 1159F MED LIST DOCD IN RCRD: CPT | Performed by: NURSE PRACTITIONER

## 2025-05-22 PROCEDURE — 1123F ACP DISCUSS/DSCN MKR DOCD: CPT | Performed by: NURSE PRACTITIONER

## 2025-05-22 PROCEDURE — 1160F RVW MEDS BY RX/DR IN RCRD: CPT | Performed by: NURSE PRACTITIONER

## 2025-05-22 PROCEDURE — 4004F PT TOBACCO SCREEN RCVD TLK: CPT | Performed by: NURSE PRACTITIONER

## 2025-05-22 PROCEDURE — 99214 OFFICE O/P EST MOD 30 MIN: CPT | Performed by: NURSE PRACTITIONER

## 2025-05-22 PROCEDURE — 3023F SPIROM DOC REV: CPT | Performed by: NURSE PRACTITIONER

## 2025-05-22 RX ORDER — MINOCYCLINE HYDROCHLORIDE 100 MG/1
100 CAPSULE ORAL 2 TIMES DAILY
COMMUNITY
Start: 2025-05-05

## 2025-05-22 RX ORDER — MONTELUKAST SODIUM 10 MG/1
10 TABLET ORAL NIGHTLY
Qty: 90 TABLET | Refills: 3 | Status: SHIPPED | OUTPATIENT
Start: 2025-05-22

## 2025-05-22 RX ORDER — ALBUTEROL SULFATE 90 UG/1
INHALANT RESPIRATORY (INHALATION)
Qty: 18 G | Refills: 11 | Status: SHIPPED | OUTPATIENT
Start: 2025-05-22

## 2025-05-22 RX ORDER — FLUTICASONE PROPIONATE AND SALMETEROL 250; 50 UG/1; UG/1
POWDER RESPIRATORY (INHALATION)
Qty: 1 EACH | Refills: 11 | Status: SHIPPED | OUTPATIENT
Start: 2025-05-22

## 2025-05-22 RX ORDER — ALBUTEROL SULFATE 0.83 MG/ML
SOLUTION RESPIRATORY (INHALATION)
Qty: 360 ML | Refills: 11 | Status: SHIPPED | OUTPATIENT
Start: 2025-05-22

## 2025-05-22 ASSESSMENT — PULMONARY FUNCTION TESTS
FEV1_PERCENT_PREDICTED_POC: 49
FVC_PERCENT_PREDICTED_POC: 57

## 2025-05-22 ASSESSMENT — ENCOUNTER SYMPTOMS
SPUTUM PRODUCTION: 0
COUGH: 0
SHORTNESS OF BREATH: 1

## 2025-05-22 NOTE — PROGRESS NOTES
MD or Malik, 40  min, arrive 15 min PTA, COPD, spirometry.             Orders Placed This Encounter   Medications    montelukast (SINGULAIR) 10 MG tablet     Sig: Take 1 tablet by mouth nightly     Dispense:  90 tablet     Refill:  3    fluticasone-salmeterol (ADVAIR) 250-50 MCG/ACT AEPB diskus inhaler     Si inhalation twice daily, rinse mouth after use     Dispense:  1 each     Refill:  11    albuterol sulfate HFA (PROVENTIL;VENTOLIN;PROAIR) 108 (90 Base) MCG/ACT inhaler     Si puffs 4 times daily if needed for shortness of breath or wheezing.  Patient will call when refills are needed     Dispense:  18 g     Refill:  11    albuterol (PROVENTIL) (2.5 MG/3ML) 0.083% nebulizer solution     Si vial via nebulizer 4 times daily if needed for shortness of breath or wheezing.  Diagnosis-COPD, J44.9.  Bill to Medicare part B. Patient will call when refills are needed     Dispense:  360 mL     Refill:  11           PERLITA PARKER APRN - CNP    Total  time spent with patient - 40 min.     Collaborating MD: Dr. Al      HISTORY OF PRESENT ILLNESS:    The patient (or guardian, if applicable) and other individuals in attendance with the patient were advised that Artificial Intelligence will be utilized during this visit to record, process the conversation to generate a clinical note, and support improvement of the AI technology. The patient (or guardian, if applicable) and other individuals in attendance at the appointment consented to the use of AI, including the recording.        History of Present Illness    The patient is a very pleasant 80-year-old male seen today for follow-up of severe COPD, allergic rhinitis, SHERI, asbestos exposure, and prior tobacco use, having quit in . History is also notable for ventricular tachycardia, PAF, ischemic cardiomyopathy, status post ICD. He remains on long-term amiodarone therapy. Due to long-term amiodarone therapy as well as restrictive defect, complete

## 2025-05-22 NOTE — PATIENT INSTRUCTIONS
Encouraged to use Advair 1 inhalation every morning, gargle with water after use.    If he develops worsening shortness of breath or wheezing or need for albuterol inhaler or nebulizer, recommend increasing Advair to standard dose of 1 inhalation twice daily.    Continue Singulair at bedtime.    Flonase nasal spray-1 to 2 sprays each nostril daily as needed for control of nasal congestion/drainage.    Continue CPAP as prescribed.    Follow-up in approximately 11 months, sooner if needed.  Spirometry will be obtained at that time.

## 2025-07-28 ENCOUNTER — OFFICE VISIT (OUTPATIENT)
Age: 80
End: 2025-07-28
Payer: MEDICARE

## 2025-07-28 VITALS
HEIGHT: 73 IN | SYSTOLIC BLOOD PRESSURE: 132 MMHG | WEIGHT: 291 LBS | DIASTOLIC BLOOD PRESSURE: 68 MMHG | BODY MASS INDEX: 38.57 KG/M2 | HEART RATE: 80 BPM

## 2025-07-28 DIAGNOSIS — Z95.810 ICD (IMPLANTABLE CARDIOVERTER-DEFIBRILLATOR) IN PLACE: ICD-10-CM

## 2025-07-28 DIAGNOSIS — I48.0 PAROXYSMAL ATRIAL FIBRILLATION (HCC): Primary | ICD-10-CM

## 2025-07-28 DIAGNOSIS — I10 PRIMARY HYPERTENSION: ICD-10-CM

## 2025-07-28 DIAGNOSIS — I25.5 ISCHEMIC CARDIOMYOPATHY: ICD-10-CM

## 2025-07-28 DIAGNOSIS — I25.10 CORONARY ARTERY DISEASE INVOLVING NATIVE CORONARY ARTERY OF NATIVE HEART WITHOUT ANGINA PECTORIS: ICD-10-CM

## 2025-07-28 DIAGNOSIS — R53.1 GENERAL WEAKNESS: ICD-10-CM

## 2025-07-28 PROCEDURE — 1123F ACP DISCUSS/DSCN MKR DOCD: CPT | Performed by: INTERNAL MEDICINE

## 2025-07-28 PROCEDURE — 1159F MED LIST DOCD IN RCRD: CPT | Performed by: INTERNAL MEDICINE

## 2025-07-28 PROCEDURE — 99214 OFFICE O/P EST MOD 30 MIN: CPT | Performed by: INTERNAL MEDICINE

## 2025-07-28 PROCEDURE — 3078F DIAST BP <80 MM HG: CPT | Performed by: INTERNAL MEDICINE

## 2025-07-28 PROCEDURE — 3075F SYST BP GE 130 - 139MM HG: CPT | Performed by: INTERNAL MEDICINE

## 2025-07-28 PROCEDURE — G8417 CALC BMI ABV UP PARAM F/U: HCPCS | Performed by: INTERNAL MEDICINE

## 2025-07-28 PROCEDURE — 1126F AMNT PAIN NOTED NONE PRSNT: CPT | Performed by: INTERNAL MEDICINE

## 2025-07-28 PROCEDURE — 93000 ELECTROCARDIOGRAM COMPLETE: CPT | Performed by: INTERNAL MEDICINE

## 2025-07-28 PROCEDURE — G8427 DOCREV CUR MEDS BY ELIG CLIN: HCPCS | Performed by: INTERNAL MEDICINE

## 2025-07-28 PROCEDURE — 4004F PT TOBACCO SCREEN RCVD TLK: CPT | Performed by: INTERNAL MEDICINE

## 2025-07-28 PROCEDURE — 1160F RVW MEDS BY RX/DR IN RCRD: CPT | Performed by: INTERNAL MEDICINE

## 2025-07-28 ASSESSMENT — ENCOUNTER SYMPTOMS
SHORTNESS OF BREATH: 1
BLURRED VISION: 0
ORTHOPNEA: 0
HOARSE VOICE: 0
BOWEL INCONTINENCE: 0
ABDOMINAL PAIN: 0
HEMATOCHEZIA: 0
NAUSEA: 1
DIARRHEA: 1
CHEST TIGHTNESS: 0
HEMATEMESIS: 0
COLOR CHANGE: 0
WHEEZING: 0
SPUTUM PRODUCTION: 0

## 2025-07-28 NOTE — PROGRESS NOTES
[facility] CARDIOLOGY  [address] [place] [address], [address]  [place] [address]  PHONE: [phone]        25        NAME:  Wilfred Guillen  : 1945  MRN: [id number]       SUBJECTIVE:   Wilfred Guillen is a 80 y.o. male seen for a follow up visit regarding the following: The patient has CAD, PAF, history of VT, COPD, PAD, and history of ICD.  He returns for scheduled follow-up.  He reports recent problems with increasing weakness, fatigue, low-grade fever, and intermittent diarrhea.  His wife is concerned with the possibility of recurrent UTI and sepsis.    Chief Complaint   Patient presents with    Tachycardia       HPI:    Coronary Artery Disease  Presents for follow-up visit. Symptoms include muscle weakness and shortness of breath. Pertinent negatives include no chest pain, chest pressure, chest tightness, dizziness, leg swelling, palpitations or weight gain. The symptoms have been stable.       Past Medical History, Past Surgical History, Family history, Social History, and Medications were all reviewed with the patient today and updated as necessary.         Current Outpatient Medications:     montelukast (SINGULAIR) 10 MG tablet, Take 1 tablet by mouth nightly, Disp: 90 tablet, Rfl: 3    fluticasone-salmeterol (ADVAIR) 250-50 MCG/ACT AEPB diskus inhaler, 1 inhalation twice daily, rinse mouth after use, Disp: 1 each, Rfl: 11    albuterol sulfate HFA (PROVENTIL;VENTOLIN;PROAIR) 108 (90 Base) MCG/ACT inhaler, 2 puffs 4 times daily if needed for shortness of breath or wheezing.  Patient will call when refills are needed, Disp: 18 g, Rfl: 11    albuterol (PROVENTIL) (2.5 MG/3ML) 0.083% nebulizer solution, 1 vial via nebulizer 4 times daily if needed for shortness of breath or wheezing.  Diagnosis-COPD, J44.9.  Bill to Medicare part B. Patient will call when refills are needed, Disp: 360 mL, Rfl: 11    amiodarone (CORDARONE) 200 MG tablet, Take 1 tablet by mouth daily, Disp: 90 tablet, Rfl: 3

## (undated) DEVICE — 3M™ IOBAN™ 2 ANTIMICROBIAL INCISE DRAPE 6650EZ: Brand: IOBAN™ 2

## (undated) DEVICE — INTENDED FOR TISSUE SEPARATION, AND OTHER PROCEDURES THAT REQUIRE A SHARP SURGICAL BLADE TO PUNCTURE OR CUT.: Brand: BARD-PARKER ® STAINLESS STEEL BLADES

## (undated) DEVICE — INTRODUCER SHEATH SAFESHEATH 8FR 13CM SPLITTABLE DILATOR

## (undated) DEVICE — BLADE CLIPPER GEN PURP NS

## (undated) DEVICE — BONE TAMP KIT KPX153PB FF X2 15/3 1 STP: Brand: KYPHOPAK™ TRAY

## (undated) DEVICE — NEEDLE HYPO 21GA L1.5IN INTRAMUSCULAR S STL LATCH BVL UP

## (undated) DEVICE — SURGICAL PROCEDURE PACK BASIC ST FRANCIS

## (undated) DEVICE — Z DUPLICATE USE 2275497 DRSG POSTOP PRMSL AG 3.5X6IN

## (undated) DEVICE — BAG,DRAINAGE,4L,A/R TOWER,LL,SLIDE TAP: Brand: MEDLINE

## (undated) DEVICE — CATHETER VASC 6 FR DIAG PGTL W/ SIDE H COR 110 CM LEN W/OUT

## (undated) DEVICE — Device

## (undated) DEVICE — GUIDEWIRE 035IN 210CM PTFE COAT FIX COR J TIP 15MM FIRM BODY

## (undated) DEVICE — BONE CEMENT CX01B KYPHON XPEDE W MXR US: Brand: KYPHON® XPEDE™ BONE CEMENT AND KYPHON® MIXER PACK

## (undated) DEVICE — PLASMABLADE X PS210-030S-LIGHT 3.0SL: Brand: PLASMABLADE™ X

## (undated) DEVICE — SUT ETHBND 0 18IN MO6 MP GRN --

## (undated) DEVICE — SUTURE ETHLN SZ 2-0 L18IN NONABSORBABLE BLK L26MM PS 3/8 585H

## (undated) DEVICE — DERMABOND SKIN ADH 0.7ML -- DERMABOND ADVANCED 12/BX

## (undated) DEVICE — PAD,NON-ADHERENT,3X8,STERILE,LF,1/PK: Brand: MEDLINE

## (undated) DEVICE — SOLUTION IRRIG 3000ML H2O STRL BAG

## (undated) DEVICE — MICROPUNCTURE INTRODUCER SET SILHOUETTE TRANSITIONLESS WITH NITINOL WIRE GUIDE: Brand: MICROPUNCTURE

## (undated) DEVICE — RADIFOCUS OPTITORQUE ANGIOGRAPHIC CATHETER: Brand: OPTITORQUE

## (undated) DEVICE — 18G NG KIT WITH 96IN PROBE COVER (10 PK): Brand: SITE-RITE

## (undated) DEVICE — GOWN,REINFORCED,POLY,AURORA,XXLARGE,STR: Brand: MEDLINE

## (undated) DEVICE — SUTURE ABSORBABLE MONOFILAMENT 4-0 CV15 6 IN PUR V-LOC 90 VLOCM1203

## (undated) DEVICE — GLOVE,SURG,TRIUMPH MICRO,LTX,PF,7.5: Brand: MEDLINE

## (undated) DEVICE — INTRO PEELWY HEMVLV 6F 13CM -- SHRT PRELUDE SNAP

## (undated) DEVICE — BONE BIOPSY DEVICE F05A BBD SIZE 3: Brand: MEDTRONIC REUSABLE INSTRUMENTS

## (undated) DEVICE — 3M™ TEGADERM™ TRANSPARENT FILM DRESSING FRAME STYLE, 1624W, 2-3/8 IN X 2-3/4 IN (6 CM X 7 CM), 100/CT 4CT/CASE: Brand: 3M™ TEGADERM™

## (undated) DEVICE — PINNACLE TIF INTRODUCER SHEATH: Brand: PINNACLE

## (undated) DEVICE — 1010 S-DRAPE TOWEL DRAPE 10/BX: Brand: STERI-DRAPE™

## (undated) DEVICE — DEVICE STBL AD TRICOT ANCHR PD FOR 3 W F CATH STATLOK

## (undated) DEVICE — C-ARM: Brand: UNBRANDED

## (undated) DEVICE — SHEET, T, LAPAROTOMY, STERILE: Brand: MEDLINE

## (undated) DEVICE — SHEET,DRAPE,53X77,STERILE: Brand: MEDLINE

## (undated) DEVICE — SUTURE STRATAFIX SPRL SZ 3-0 L9IN ABSRB VLT FS L26MM 3/8 SXPD2B419

## (undated) DEVICE — APPLICATOR MEDICATED 26 CC SOLUTION HI LT ORNG CHLORAPREP

## (undated) DEVICE — DEVICE COMPR LNG 27 CM VASC BND

## (undated) DEVICE — GLIDESHEATH SLENDER STAINLESS STEEL KIT: Brand: GLIDESHEATH SLENDER

## (undated) DEVICE — GUIDE NDL ST W/ 14 X 147CM TELESCOPICALLY FLD CIV FLX CVR

## (undated) DEVICE — IBT KIT KPX203PB-A FF X2 20/3 1 STP: Brand: KYPHON® ONE-STEP™ SYSTEM, TROCAR & DIAMOND AND BFD

## (undated) DEVICE — CATHETER URETH 16FR BLLN 5CC 2 W F SPEC M OLV COUDE TIP

## (undated) DEVICE — GLOVE ORANGE PI 8 1/2   MSG9085

## (undated) DEVICE — MULTI-TAP ADAPTER A23 SIZE 3 AAC: Brand: KYPHON® MULTI-TAP™ ADAPTER

## (undated) DEVICE — GARMENT,MEDLINE,DVT,INT,CALF,MED, GEN2: Brand: MEDLINE

## (undated) DEVICE — CATHETER DIAG AD 6FR L100CM 0.038IN COR POLYUR JUDKINS R 5